# Patient Record
Sex: MALE | Race: WHITE | NOT HISPANIC OR LATINO | Employment: OTHER | URBAN - METROPOLITAN AREA
[De-identification: names, ages, dates, MRNs, and addresses within clinical notes are randomized per-mention and may not be internally consistent; named-entity substitution may affect disease eponyms.]

---

## 2018-12-17 ENCOUNTER — CONVERSION ENCOUNTER (OUTPATIENT)
Dept: GASTROENTEROLOGY | Facility: CLINIC | Age: 53
End: 2018-12-17

## 2018-12-17 ENCOUNTER — OFFICE VISIT CONVERTED (OUTPATIENT)
Dept: GASTROENTEROLOGY | Facility: CLINIC | Age: 53
End: 2018-12-17
Attending: PHYSICIAN ASSISTANT

## 2019-01-08 ENCOUNTER — HOSPITAL ENCOUNTER (OUTPATIENT)
Dept: GASTROENTEROLOGY | Facility: HOSPITAL | Age: 54
Setting detail: HOSPITAL OUTPATIENT SURGERY
Discharge: HOME | End: 2019-01-08
Attending: INTERNAL MEDICINE

## 2019-01-08 LAB
ALBUMIN SERPL-MCNC: 3.6 G/DL (ref 3.5–5)
ALBUMIN/GLOB SERPL: 1.2 {RATIO} (ref 1.4–2.6)
ALP SERPL-CCNC: 89 U/L (ref 56–119)
ALT SERPL-CCNC: 27 U/L (ref 10–40)
ANION GAP SERPL CALC-SCNC: 13 MMOL/L (ref 8–19)
AST SERPL-CCNC: 46 U/L (ref 15–50)
BILIRUB SERPL-MCNC: 0.63 MG/DL (ref 0.2–1.3)
BUN SERPL-MCNC: 26 MG/DL (ref 5–25)
BUN/CREAT SERPL: 28 {RATIO} (ref 6–20)
CALCIUM SERPL-MCNC: 9.2 MG/DL (ref 8.7–10.4)
CHLORIDE SERPL-SCNC: 106 MMOL/L (ref 99–111)
CONV CO2: 23 MMOL/L (ref 22–32)
CONV TOTAL PROTEIN: 6.6 G/DL (ref 6.3–8.2)
CREAT UR-MCNC: 0.92 MG/DL (ref 0.7–1.2)
GFR SERPLBLD BASED ON 1.73 SQ M-ARVRAT: >60 ML/MIN/{1.73_M2}
GLOBULIN UR ELPH-MCNC: 3 G/DL (ref 2–3.5)
GLUCOSE BLD-MCNC: 145 MG/DL (ref 70–99)
GLUCOSE SERPL-MCNC: 154 MG/DL (ref 70–99)
INR PPP: 3.29 (ref 2–3)
OSMOLALITY SERPL CALC.SUM OF ELEC: 292 MOSM/KG (ref 273–304)
POTASSIUM SERPL-SCNC: 4.8 MMOL/L (ref 3.5–5.3)
PROTHROMBIN TIME: 30.5 S (ref 9.4–12)
SODIUM SERPL-SCNC: 137 MMOL/L (ref 135–147)

## 2019-01-10 ENCOUNTER — HOSPITAL ENCOUNTER (OUTPATIENT)
Dept: GASTROENTEROLOGY | Facility: HOSPITAL | Age: 54
Setting detail: HOSPITAL OUTPATIENT SURGERY
Discharge: HOME | End: 2019-01-10
Attending: INTERNAL MEDICINE

## 2019-01-10 LAB
GLUCOSE BLD-MCNC: 146 MG/DL (ref 70–99)
INR PPP: 2.17 (ref 2–3)
PROTHROMBIN TIME: 20.5 S (ref 9.4–12)

## 2019-01-17 ENCOUNTER — OFFICE VISIT CONVERTED (OUTPATIENT)
Dept: OTHER | Facility: HOSPITAL | Age: 54
End: 2019-01-17
Attending: INTERNAL MEDICINE

## 2019-02-20 ENCOUNTER — OFFICE VISIT CONVERTED (OUTPATIENT)
Dept: GASTROENTEROLOGY | Facility: CLINIC | Age: 54
End: 2019-02-20
Attending: PHYSICIAN ASSISTANT

## 2019-03-25 ENCOUNTER — HOSPITAL ENCOUNTER (OUTPATIENT)
Dept: PHYSICAL THERAPY | Facility: CLINIC | Age: 54
Setting detail: RECURRING SERIES
Discharge: STILL ADMITTED OR ADMINISTRATIVE | End: 2019-03-26
Attending: NURSE PRACTITIONER

## 2019-03-27 ENCOUNTER — HOSPITAL ENCOUNTER (OUTPATIENT)
Dept: PHYSICAL THERAPY | Facility: CLINIC | Age: 54
Setting detail: RECURRING SERIES
Discharge: HOME | End: 2019-06-24
Attending: NURSE PRACTITIONER

## 2019-06-25 ENCOUNTER — OFFICE VISIT CONVERTED (OUTPATIENT)
Dept: OTHER | Facility: HOSPITAL | Age: 54
End: 2019-06-25
Attending: INTERNAL MEDICINE

## 2019-07-26 ENCOUNTER — OFFICE VISIT CONVERTED (OUTPATIENT)
Dept: GASTROENTEROLOGY | Facility: CLINIC | Age: 54
End: 2019-07-26
Attending: PHYSICIAN ASSISTANT

## 2019-07-26 ENCOUNTER — HOSPITAL ENCOUNTER (OUTPATIENT)
Dept: OTHER | Facility: HOSPITAL | Age: 54
Discharge: HOME | End: 2019-07-26
Attending: PHYSICIAN ASSISTANT

## 2019-07-26 LAB
ALBUMIN SERPL-MCNC: 3.1 G/DL (ref 3.5–5)
ALBUMIN/GLOB SERPL: 0.7 {RATIO} (ref 1.4–2.6)
ALP SERPL-CCNC: 131 U/L (ref 56–119)
ALT SERPL-CCNC: 25 U/L (ref 10–40)
AMMONIA PLAS-MCNC: 90 UMOL/L (ref 16–60)
ANION GAP SERPL CALC-SCNC: 18 MMOL/L (ref 8–19)
AST SERPL-CCNC: 49 U/L (ref 15–50)
BASOPHILS # BLD MANUAL: 0.07 10*3/UL (ref 0–0.2)
BASOPHILS NFR BLD MANUAL: 0.7 % (ref 0–3)
BILIRUB SERPL-MCNC: 1.99 MG/DL (ref 0.2–1.3)
BUN SERPL-MCNC: 18 MG/DL (ref 5–25)
BUN/CREAT SERPL: 17 {RATIO} (ref 6–20)
CALCIUM SERPL-MCNC: 9.4 MG/DL (ref 8.7–10.4)
CHLORIDE SERPL-SCNC: 91 MMOL/L (ref 99–111)
CONV AFP: 3.2 NG/ML (ref 0–8.7)
CONV CO2: 25 MMOL/L (ref 22–32)
CONV TOTAL PROTEIN: 7.5 G/DL (ref 6.3–8.2)
CREAT UR-MCNC: 1.06 MG/DL (ref 0.7–1.2)
DEPRECATED RDW RBC AUTO: 41.9 FL
EOSINOPHIL # BLD MANUAL: 0.32 10*3/UL (ref 0–0.7)
EOSINOPHIL NFR BLD MANUAL: 3.4 % (ref 0–7)
ERYTHROCYTE [DISTWIDTH] IN BLOOD BY AUTOMATED COUNT: 13.3 % (ref 11.5–14.5)
FOLATE SERPL-MCNC: 16.5 NG/ML (ref 4.8–20)
GFR SERPLBLD BASED ON 1.73 SQ M-ARVRAT: >60 ML/MIN/{1.73_M2}
GLOBULIN UR ELPH-MCNC: 4.4 G/DL (ref 2–3.5)
GLUCOSE SERPL-MCNC: 328 MG/DL (ref 70–99)
GRANS (ABSOLUTE): 5.65 10*3/UL (ref 2–8)
GRANS: 60.4 % (ref 30–85)
HBA1C MFR BLD: 13.2 G/DL (ref 14–18)
HCT VFR BLD AUTO: 37.1 % (ref 42–52)
IMM GRANULOCYTES # BLD: 0.02 10*3/UL (ref 0–0.54)
IMM GRANULOCYTES NFR BLD: 0.2 % (ref 0–0.43)
INR PPP: 1.61 (ref 2–3)
LYMPHOCYTES # BLD MANUAL: 2.37 10*3/UL (ref 1–5)
LYMPHOCYTES NFR BLD MANUAL: 10 % (ref 3–10)
MCH RBC QN AUTO: 30.3 PG (ref 27–31)
MCHC RBC AUTO-ENTMCNC: 35.6 G/DL (ref 33–37)
MCV RBC AUTO: 85.3 FL (ref 80–96)
MONOCYTES # BLD AUTO: 0.94 10*3/UL (ref 0.2–1.2)
OSMOLALITY SERPL CALC.SUM OF ELEC: 287 MOSM/KG (ref 273–304)
PLATELET # BLD AUTO: 167 10*3/UL (ref 130–400)
PMV BLD AUTO: 10.5 FL (ref 7.4–10.4)
POTASSIUM SERPL-SCNC: 3.4 MMOL/L (ref 3.5–5.3)
PROTHROMBIN TIME: 15.6 S (ref 9.4–12)
RBC # BLD AUTO: 4.35 10*6/UL (ref 4.7–6.1)
SODIUM SERPL-SCNC: 131 MMOL/L (ref 135–147)
VARIANT LYMPHS NFR BLD MANUAL: 25.3 % (ref 20–45)
VIT B12 SERPL-MCNC: 1210 PG/ML (ref 211–911)
WBC # BLD AUTO: 9.37 10*3/UL (ref 4.8–10.8)

## 2019-08-22 ENCOUNTER — CONVERSION ENCOUNTER (OUTPATIENT)
Dept: OTHER | Facility: HOSPITAL | Age: 54
End: 2019-08-22

## 2019-10-28 ENCOUNTER — OFFICE VISIT CONVERTED (OUTPATIENT)
Dept: GASTROENTEROLOGY | Facility: CLINIC | Age: 54
End: 2019-10-28
Attending: PHYSICIAN ASSISTANT

## 2019-10-28 ENCOUNTER — CONVERSION ENCOUNTER (OUTPATIENT)
Dept: OTHER | Facility: HOSPITAL | Age: 54
End: 2019-10-28

## 2019-12-19 ENCOUNTER — CONVERSION ENCOUNTER (OUTPATIENT)
Dept: OTHER | Facility: HOSPITAL | Age: 54
End: 2019-12-19

## 2020-01-28 ENCOUNTER — OFFICE VISIT CONVERTED (OUTPATIENT)
Dept: GASTROENTEROLOGY | Facility: CLINIC | Age: 55
End: 2020-01-28
Attending: PHYSICIAN ASSISTANT

## 2020-02-13 ENCOUNTER — HOSPITAL ENCOUNTER (OUTPATIENT)
Dept: GASTROENTEROLOGY | Facility: HOSPITAL | Age: 55
Setting detail: HOSPITAL OUTPATIENT SURGERY
Discharge: HOME | End: 2020-02-13
Attending: INTERNAL MEDICINE

## 2020-02-13 LAB — GLUCOSE BLD-MCNC: 209 MG/DL (ref 70–99)

## 2020-03-19 ENCOUNTER — CONVERSION ENCOUNTER (OUTPATIENT)
Dept: OTHER | Facility: HOSPITAL | Age: 55
End: 2020-03-19

## 2020-05-13 ENCOUNTER — OFFICE VISIT CONVERTED (OUTPATIENT)
Dept: GASTROENTEROLOGY | Facility: CLINIC | Age: 55
End: 2020-05-13
Attending: PHYSICIAN ASSISTANT

## 2020-07-09 ENCOUNTER — OFFICE VISIT CONVERTED (OUTPATIENT)
Dept: OTHER | Facility: HOSPITAL | Age: 55
End: 2020-07-09
Attending: INTERNAL MEDICINE

## 2020-10-26 ENCOUNTER — OFFICE VISIT CONVERTED (OUTPATIENT)
Dept: GASTROENTEROLOGY | Facility: CLINIC | Age: 55
End: 2020-10-26
Attending: NURSE PRACTITIONER

## 2021-05-13 NOTE — PROGRESS NOTES
Quick Note      Patient Name: Nic Nicolas   Patient ID: [id number]   Sex: Male   YOB: 1965    Primary Care Provider: Sangeeta LA   Referring Provider: Sangeeta LA    Visit Date: May 13, 2020    Provider: Marquis Colmenares PA-C   Location: [facility]   Location Address: [address] [place] [address]  [id number]   Location Phone: [phone]          History Of Present Illness  TELEHEALTH TELEPHONE VISIT  Chief Complaint: Colonoscopy follow up   Nic Nicolas is a 55 year old /White male who is presenting for evaluation via telehealth telephone visit. Verbal consent obtained before beginning visit.   Provider spent 11 minutes with the patient during telehealth visit.   The following staff were present during this visit: Avila [place] MA   Past Medical History/Overview of Patient Symptoms     55-year-old male with history of cirrhosis due to probable nonalcoholic fatty liver disease returns for his colonoscopy results.  Review of his colonoscopy by Dr. Roblero 02/2020 show external grade II hemorrhoids with band litagtion and a tubular adenoma successfully removed from the colon.  Recommended a colonoscopy in 5 years. He is currently taking spironolactone 100 mg daily and Lasix 40 mg mg daily.  He is also on Xifaxan 550 mg twice daily and lactulose to produce 2-3 bowel movements daily.  He had an EGD by Dr. Roblero 01/2019 showed grade 1 esophagitis and gastritis.  There is no evidence of varices seen at that time.  His weight today is 303 pounds which is up 4 pounds since his last office visit.  His most recent lab work showed ammonia 65, INR 1.6, hemoglobin 12, platelets 80 K, glucose 353, T bili 2.  He had a normal RUQ US 02/2020. He did have a recent UTI and was treated with Cipro.           Assessment  · GERD (gastroesophageal reflux disease)     530.81/K21.9  · Personal history of colonic  polyps     V12.72/Z86.010  · Cirrhosis     571.5/K74.60      Plan  · Orders  o CBC with Auto Diff [facility] (31007) - 530.81/K21.9, V12.72/Z86.010, 571.5/K74.60 - 05/13/2020  o CMP [facility] (88700) - 530.81/K21.9, V12.72/Z86.010, 571.5/K74.60 - 05/13/2020  o Physician Telephone Evaluation, 11-20 minutes (13890) - 530.81/K21.9, V12.72/Z86.010, 571.5/K74.60 - 05/13/2020  o PT/INR (42414) - 530.81/K21.9, V12.72/Z86.010, 571.5/K74.60 - 05/13/2020  · Medications  o Medications have been Reconciled  o Transition of Care or Provider Policy  · Instructions  o Plan Of Care: 55 year old male with cirrhosis. He will continue current medications. He will monitor his weight daily, have less than 2 g of sodium in his diet, avoid any alcohol, and have good control of blood glucose levels. He will have the labwork above and call for results. He will follow up in 3 months and at that time he will need a RU US.            Electronically Signed by: Marquis Colmenares PA-C -Author on May 13, 2020 10:25:16 AM  Electronically Co-signed by: Karlos Roblero MD -Reviewer on May 13, 2020 04:30:53 PM

## 2021-05-13 NOTE — PROGRESS NOTES
Progress Note      Patient Name: Nic Nicolas   Patient ID: [id number]   Sex: Male   YOB: 1965    Primary Care Provider: Sangeeta LA   Referring Provider: Sangeeta LA    Visit Date: October 26, 2020    Provider: BESSIE Armas   Location: [facility]   Location Address: [address] [place] [address]  [place]  [id number]   Location Phone: [phone]          Chief Complaint  · Cirrhosis      History Of Present Illness     55-year-old male with a history of cirrhosis due to probable nonalcoholic fatty liver disease returns for follow-up.  He was admitted at [facility] for hepatic encephalopathy earlier this month for 3 days.   He is taking spironolactone 100 mg daily, Lasix 80 mg daily, Xifaxan 550 twice daily, omeprazole 40 mg daily and lactulose.  He reports he has been having a lot of issues with memory loss and being disoriented.  He reports he also had chest pain while he was in the ER.  He reports that he always has swelling in his legs due to lymphedema.  He does report occasional jaundice, having slight jaundice in office today.  He reports that he does not weigh himself because he forgets to. He does report that he is having a lot of stomach issues, making it hard for him to take lactulose. Meld score 19. Review his colonoscopy by Dr. Roblero 02/2020 revealed grade 2 external hemorrhoids with band ligation and a tubular adenoma removed from the colon.  Recommend repeat colonoscopy 2025.  Review of his last EGD by Dr. Roblero 01/2019 revealed grade 1 esophagitis and gastritis with no evidence of varices.      Labs 10/08/2020: Hemoglobin 10.2, hematocrit 33.1, platelets 108, INR 2.72, ALT 29, AST 62, total bili 1.34, alk phos 112, creatinine 0.  88         Past Medical History  Cirrhosis; Colon polyps; Diabetes; Hypertension; Reflux; Sleep apnea         Past Surgical History  Colonoscopy; EGD         Medication List  amlodipine 10 mg oral tablet;  "baclofen 10 mg oral tablet; Basaglar KwikPen U-100 Insulin 100 unit/mL (3 mL) subcutaneous insulin pen; buspirone 7.5 mg oral tablet; famotidine 40 mg oral tablet; furosemide 40 mg oral tablet; lactulose 10 gram/15 mL oral solution; loratadine 10 mg oral tablet; omeprazole 40 mg oral capsule,delayed release(DR/EC); oxycodone 10 mg oral tablet; potassium 99 mg oral tablet; promethazine 25 mg oral tablet; propranolol 20 mg oral tablet; sildenafil 100 mg oral tablet; spironolactone 100 mg oral tablet; sucralfate 1 gram oral tablet; Ventolin HFA 90 mcg/actuation inhalation HFA aerosol inhaler; warfarin 10 mg oral tablet; Xifaxan 550 mg oral tablet         Allergy List  NO KNOWN DRUG ALLERGIES       Allergies Reconciled  Family Medical History  Stomach Neoplasm, Malignant         Social History  Alcohol; Tobacco (Never)         Review of Systems  · Constitutional  o Denies  o : chills, fever  · Cardiovascular  o Admits  o : chest pain  · Respiratory  o Admits  o : shortness of breath  · Gastrointestinal  o Denies  o : nausea, vomiting, dysphagia  · Endocrine  o Denies  o : weight gain, weight loss      Vitals  Date Time BP Position Site L\R Cuff Size HR RR TEMP (F) WT  HT  BMI kg/m2 BSA m2 O2 Sat FR L/min FiO2 HC       10/26/2020 01:17 /71 Sitting    97 - R 16  328lbs 0oz 5'  8\" 49.87 2.67 97 %            Physical Examination  · Constitutional  o Appearance  o : obese, well developed, slight jaundice  · Head and Face  o Head  o : Normocephalic with no worriesome skin lesions  · Eyes  o Vision  o :   § Visual Fields  § : eyes move symmetrical in all directions  o Sclerae  o : sclerae anicteric  · Neck  o Inspection/Palpation  o : Trachea is midline, no adenopathy  · Respiratory  o Respiratory Effort  o : Breathing is unlabored.  o Inspection of Chest  o : normal appearance  o Auscultation of Lungs  o : Chest is clear to auscultation bilaterally.  · Cardiovascular  o Heart  o :   § Auscultation of Heart  § : no " murmurs, rubs, or gallops, RRR  o Peripheral Vascular System  o :   § Extremities  § : no cyanosis, clubbing or edema;   · Gastrointestinal  o Abdominal Examination  o : Abdomen is soft, nontender to palpation, with normal active bowel sounds, no appreciable hepatosplenomegaly.          Assessment  · Cirrhosis Of Liver     571.5/K74.60  · Colonic Polyps, Personal History of     V12.72      Plan  · Orders  o CBC with Auto Diff [facility] (49659) - 571.5/K74.60 - 10/26/2020  o CMP [facility] (05358) - 571.5/K74.60 - 10/26/2020  o PT/INR (23870) - 571.5/K74.60 - 10/26/2020  o Alpha fetoprotein test (76250, 32145) - 571.5/K74.60 - 10/26/2020  o Ammonia level (89483) - 571.5/K74.60 - 10/26/2020  o RUQ US (right upper quadrant ultrasound) (51250) - 571.5/K74.60 - 10/26/2020  · Medications  o ondansetron 4 mg oral tablet,disintegrating   SIG: dissolve 1 tablet by oral route 2 times a day   DISP: (30) Tablet with 3 refills  Prescribed on 10/26/2020     o Medications have been Reconciled  o Transition of Care or Provider Policy  · Instructions  o 55-year-old male with a history of cirrhosis due to probable nonalcoholic fatty liver disease returns for follow-up. We have discussed his recent hospitalization and his need to continue his lactulose. I have also discussed the importance of a daily weight. I have sent in a prescription of Zofran 4 mg twice daily to see if that will help improve his nausea. I am also going to refer him to get evaluated for possible liver transplant. I am going to have him undergo a right upper quadrant ultrasound, CMP, CBC, PT/INR, alpha-fetoprotein, and ammonia level. We have discussed based off his ultrasound, he may need a paracentesis. I am going to have him follow-up in 3 months time.            Electronically Signed by: BESSIE Armas -Author on October 26, 2020 02:03:11 PM  Electronically Co-signed by: Karlos Roblero MD -Reviewer on November 3, 2020 05:22:27 PM

## 2021-05-14 VITALS
HEART RATE: 97 BPM | HEIGHT: 68 IN | DIASTOLIC BLOOD PRESSURE: 71 MMHG | OXYGEN SATURATION: 97 % | BODY MASS INDEX: 47.74 KG/M2 | RESPIRATION RATE: 16 BRPM | WEIGHT: 315 LBS | SYSTOLIC BLOOD PRESSURE: 180 MMHG

## 2021-05-15 VITALS
DIASTOLIC BLOOD PRESSURE: 70 MMHG | HEART RATE: 91 BPM | OXYGEN SATURATION: 97 % | SYSTOLIC BLOOD PRESSURE: 148 MMHG | HEIGHT: 68 IN | WEIGHT: 303 LBS | BODY MASS INDEX: 45.92 KG/M2 | RESPIRATION RATE: 16 BRPM

## 2021-05-15 VITALS
BODY MASS INDEX: 47.74 KG/M2 | SYSTOLIC BLOOD PRESSURE: 170 MMHG | HEIGHT: 68 IN | OXYGEN SATURATION: 97 % | HEART RATE: 91 BPM | DIASTOLIC BLOOD PRESSURE: 79 MMHG | RESPIRATION RATE: 16 BRPM | WEIGHT: 315 LBS

## 2021-05-15 VITALS
OXYGEN SATURATION: 99 % | WEIGHT: 297 LBS | SYSTOLIC BLOOD PRESSURE: 140 MMHG | DIASTOLIC BLOOD PRESSURE: 67 MMHG | HEART RATE: 99 BPM | RESPIRATION RATE: 16 BRPM | BODY MASS INDEX: 45.01 KG/M2 | HEIGHT: 68 IN

## 2021-05-15 VITALS
WEIGHT: 299 LBS | DIASTOLIC BLOOD PRESSURE: 67 MMHG | SYSTOLIC BLOOD PRESSURE: 152 MMHG | BODY MASS INDEX: 45.31 KG/M2 | HEIGHT: 68 IN | HEART RATE: 100 BPM | OXYGEN SATURATION: 99 %

## 2021-05-15 VITALS
WEIGHT: 315 LBS | DIASTOLIC BLOOD PRESSURE: 80 MMHG | OXYGEN SATURATION: 97 % | HEART RATE: 89 BPM | BODY MASS INDEX: 47.74 KG/M2 | SYSTOLIC BLOOD PRESSURE: 217 MMHG | HEIGHT: 68 IN

## 2021-05-28 VITALS
RESPIRATION RATE: 22 BRPM | DIASTOLIC BLOOD PRESSURE: 71 MMHG | HEIGHT: 68 IN | WEIGHT: 312.2 LBS | TEMPERATURE: 97.8 F | HEIGHT: 68 IN | RESPIRATION RATE: 20 BRPM | BODY MASS INDEX: 47.32 KG/M2 | SYSTOLIC BLOOD PRESSURE: 140 MMHG | WEIGHT: 299.8 LBS | TEMPERATURE: 98.3 F | RESPIRATION RATE: 18 BRPM | HEART RATE: 81 BPM | SYSTOLIC BLOOD PRESSURE: 160 MMHG | TEMPERATURE: 98.3 F | DIASTOLIC BLOOD PRESSURE: 70 MMHG | WEIGHT: 307.9 LBS | SYSTOLIC BLOOD PRESSURE: 177 MMHG | BODY MASS INDEX: 45.44 KG/M2 | SYSTOLIC BLOOD PRESSURE: 168 MMHG | HEIGHT: 68 IN | HEART RATE: 91 BPM | DIASTOLIC BLOOD PRESSURE: 82 MMHG | OXYGEN SATURATION: 98 % | OXYGEN SATURATION: 97 % | HEART RATE: 86 BPM | WEIGHT: 315 LBS | OXYGEN SATURATION: 97 % | TEMPERATURE: 97 F | SYSTOLIC BLOOD PRESSURE: 136 MMHG | HEIGHT: 68 IN | HEIGHT: 68 IN | TEMPERATURE: 98.2 F | HEIGHT: 68 IN | OXYGEN SATURATION: 96 % | HEART RATE: 83 BPM | BODY MASS INDEX: 44.92 KG/M2 | BODY MASS INDEX: 46.66 KG/M2 | WEIGHT: 315 LBS | OXYGEN SATURATION: 96 % | DIASTOLIC BLOOD PRESSURE: 79 MMHG | RESPIRATION RATE: 20 BRPM | SYSTOLIC BLOOD PRESSURE: 183 MMHG | DIASTOLIC BLOOD PRESSURE: 109 MMHG | BODY MASS INDEX: 47.74 KG/M2 | WEIGHT: 296.4 LBS | TEMPERATURE: 97 F | RESPIRATION RATE: 24 BRPM | HEART RATE: 72 BPM | RESPIRATION RATE: 20 BRPM | OXYGEN SATURATION: 98 % | HEART RATE: 101 BPM | DIASTOLIC BLOOD PRESSURE: 92 MMHG | BODY MASS INDEX: 47.74 KG/M2

## 2021-05-28 NOTE — PROGRESS NOTES
Patient: PARISH WRIGHT     Acct: [id number]     Report: #[id number]  UNIT #: [id number]     : 1965    Encounter Date:2019  PRIMARY CARE:   ***Signed***  --------------------------------------------------------------------------------------------------------------------  DATE: 19      PCP not in lookup:        Sangeeta Fleming      Reason For Consult      NP Consult- Pancytopenia ; patient was seen on 2018 as an inpatient            History of Present Illness      53-year-old white male was seen in 2018 as an inpatient.      Impressions  at that time were pancytopenia probably secondary to hypersplenism.     He had a compensated cirrhosis and rectal bleeding during the admission            Patient feels much better he apparently was given medications for his liver.      Patient was given IV iron and no blood transfusion during his hospital stay            Past Medical/Surgical History             Hypertension             Diabetes Mellitus             No Heart Disease             Blood Clots (DVT)             No Cancer             No Lung Disease             No Kidney Disease             Other (Depression, hyperlipidemia, congestive heart failure, obesity)            Pyschiatric History      No Depression, No Anxiety, No Panic Attacks, No Bipolar, No Other            Social History      Social History:  No Tobacco Use, No Alcohol Use, No Recreational Drug use, No     Other            Family History      Hypertension (Mom, Dad), Diabetes Mellitus (Sister), Heart Disease (Mom, dad);     No Blood Clots; Cancer (Dad- Lung Cancer), Lung Disease (2 Sisters with COPD);     No Kidney Disease, No Other            Allergies      Coded Allergies:             NO KNOWN ALLERGIES (Unverified , 1/10/19)            Medications      Medications    Last Reconciled on 19 13:52 by VARUN STINSON MD      Spironolactone (Spironolactone*) 100 Mg Tablet      100 MG PO QDAY, TAB          Reported         1/17/19       Garland-Fluticasone (Fluticasone 50 mcg) 16 Gm Spray.susp      1 PUFFS NARE EACH QDAY for ALLERGIES, #1 BOTTLE 0 Refills         Reported         1/17/19       Omeprazole (Omeprazole*) 20 Mg Tablet.dr      40 MG PO HS, #30 CAP 5 Refills         Prov: Karlos Roblero         1/10/19       Potassium Chloride (K-Dur*) 10 Meq Tab.er.prt      10 MEQ PO QDAY, #30 TAB 0 Refills         Reported         1/10/19       Propranolol (Inderal) 10 Mg Tablet      10 MG PO BID, TAB         Reported         1/10/19       Gemfibrozil (Gemfibrozil*) 600 Mg Tablet      600 MG PO BID, TAB         Reported         1/10/19       Ergocalciferol (Vitamin D2) 50,000 Unit Capsule      [address]661 UNITS PO FR for 30 Days, #4 CAP         Reported         1/10/19       Baclofen (BACLOFEN) 10 Mg Tablet      10 MG PO HS, #30 TAB 0 Refills         Reported         1/10/19       Gabapentin (Gabapentin) 300 Mg Capsule      600 MG PO HS, #60 CAP 0 Refills         Reported         1/10/19       Loratadine (Alavert*) 10 Mg Tab.rapdis      10 MG PO BID, #60 TAB.RAPDIS         Reported         1/8/19       Glimepiride (Glimepiride*) 1 Mg Tablet      1 MG PO BID, TAB         Reported         1/8/19       Buspirone Hcl (Buspar) 15 Mg Tablet      7.5 MG PO HS, #30 TAB         Reported         1/8/19       MDI-Albuterol (Ventolin HFA) 18 Gm Hfa.aer.ad      2 PUFFS INH Q6H PRN for SHORTNESS OF BREATH, #1 MDI 0 Refills         Reported         1/8/19       Lactulose (Lactulose*) 10 Gm/15 Ml Solution      30 ML PO QID, #900 ML 0 Refills         Reported         1/8/19       Furosemide* (Lasix*) 40 Mg Tablet      40 MG PO BID@09,17, #60 TAB 0 Refills         Reported         1/8/19       Warfarin Sod (Coumadin*) 10 Mg Tablet      10 MG PO QDAY@16, #30 TAB 0 Refills         Reported         1/8/19       oxyCODONE HCl/Acetaminophen (oxyCODONE HCl/Acetaminophen 10/325 MG) 1 Each     Tablet      1 TAB PO Q4H PRN for PAIN, TAB 0 Refills          Reported         1/8/19      Current Medications      Current Medications Reviewed 1/17/19            Review of Systems      Abnormal as noted below; all other systems have been reviewed and are negative.      General:  Anxiety, Fatigue Scale: (9), Pain Scale: (9)      HEENT:  No Dysphagia, No Hearing Changes      Respiratory:  No Cough, No Shortness of Air; Wheezing      Cardiovascular:  No Chest Pain, No Pedal Edema      Gastrointestinal:  Nausea; No Vomiting; Appetite Good (good)      Genitourinary:  Nocturia (10x), Dysuria      Musculoskeletal:  No Joint Effusions, No Joint Tenderness      Endocrine:  No Heat Intolerance, No Cold Intolerance      Hematologic:  No Bleeding, No Bruising      Allergic/Immunologic:  No Hives, No Throat closing off      Psychological:  No Anxiety, No Depression      Neurological:  No Headaches, No Dizziness; Numbness (feet, hands)      Skin:  No Rash      Vitals:             Height 5 ft 8 in / 172.72 cm           Weight 307 lbs 14.4 oz / 139.6611 kg           BSA 2.46 m2           BMI 46.8 kg/m2           Temperature 97.0 F / 36.11 C           Pulse 72           Respirations 24           Blood Pressure 177/71           Pulse Oximetry 96%            Exam      Constitutional:  No acute distress, Conversant, Pleasant, Other (Limping)      Eyes:  Anicteric sclerae, Palpebral Conjunctivae (Pink), SUAD      HENT:  Oropharynx clear; No Erythema; Buccal mucosae (Pink)      Neck:  Supple, Full Range of Motion      Lungs:  Clear to Ausculation, Normal Respiratory Effort; No Rhonchi, No     Crackles, No Wheezes      Cardiovascular:  RRR; No Murmurs; Normal PMI, Peripheral Edema (Left more than     right)      Abdomen:  Soft, NABS; No Tenderness      Skin:  Normal temperature, Normal tone; No Induration; Other (No dermatosis)      Extremities:  No digital cyanosis, No digital ischemia, Other (Limping)      Psychiatric:  Appropriate affect, Intact judgement, AAO x 3      Neurological:  Cranial  Nerve II-XII; No Focal Sensory deficits      Breast:  Masses            Impression/Problem List      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      Thank you very much for allowing me to participate in the care of your patient.     I will keep you posted on the progress of their workup.            Patient Education:        DI for Pancytopenia            PREVENTION      Hx Influenza Vaccination:  No (DECLINES)      2 or More Falls Past Year?:  No      Fall Past Year with Injury?:  No      Hx Pneumococcal Vaccination:  No (DECLINES)      Chart initiated by      NOELLE Winters MA                 Disclaimer: Converted document may not contain table formatting or lab diagrams. Please see [employer] for the authenticated document.

## 2021-05-28 NOTE — PROGRESS NOTES
Patient: PARISH WRIGHT     Acct: [id number]     Report: #[id number]  UNIT #: [id number]     : 1965    Encounter Date:2019  PRIMARY CARE:   ***Signed***  --------------------------------------------------------------------------------------------------------------------  Progress Note      DATE: 19      Chief Complaint      FU Pancytopenia            Subjective      53-year-old white male was seen at [facility] as an inpatient.      Patient presented with pancytopenia which was deemed to be secondary to     hypersplenism.  Patient was in the hospital for decompensated liver cirrhosis     and rectal bleeding.  Patient been given iron and no blood saturations.            Patient claims that he lives day by day.  He is followed up by Dr. Karlos kan.            Past Med/Surg History            Past Med/Surg History:   Hypertension             Diabetes Mellitus             No Heart Disease             Blood Clots (DVT)             No Cancer             No Lung Disease             No Kidney Disease             Other (Depression, hyperlipidemia, congestive heart failure, obesity)            Social History      Social History:  No Tobacco Use, No Alcohol Use, No Recreational Drug use, No     Other            Allergies      Coded Allergies:             NO KNOWN ALLERGIES (Unverified , 1/10/19)            Medications      Medications    Last Reconciled on 7/3/19 18:37 by VARUN STINSON MD      Spironolactone (Spironolactone*) 100 Mg Tablet      100 MG PO QDAY, TAB         Reported         19       Garland-Fluticasone (Fluticasone 50 mcg) 16 Gm Spray.susp      1 PUFFS NARE EACH QDAY for ALLERGIES, #1 BOTTLE 0 Refills         Reported         19       Omeprazole (Omeprazole*) 20 Mg Tablet.      40 MG PO HS, #30 CAP 5 Refills         Prov: Karlos Roblero         1/10/19       Potassium Chloride (K-Dur*) 10 Meq Tab.er.prt      10 MEQ PO QDAY, #30 TAB 0 Refills         Reported          1/10/19       Propranolol (Inderal) 10 Mg Tablet      10 MG PO BID, TAB         Reported         1/10/19       Gemfibrozil (Gemfibrozil*) 600 Mg Tablet      600 MG PO BID, TAB         Reported         1/10/19       Ergocalciferol (Vitamin D2) 50,000 Unit Capsule      72903 UNITS PO FR for 30 Days, #4 CAP         Reported         1/10/19       Baclofen (BACLOFEN) 10 Mg Tablet      10 MG PO HS, #30 TAB 0 Refills         Reported         1/10/19       Gabapentin (Gabapentin) 300 Mg Capsule      600 MG PO HS, #60 CAP 0 Refills         Reported         1/10/19       Loratadine (Alavert*) 10 Mg Tab.rapdis      10 MG PO BID, #60 TAB.RAPDIS         Reported         1/8/19       Glimepiride (Glimepiride*) 1 Mg Tablet      1 MG PO BID, TAB         Reported         1/8/19       Buspirone Hcl (Buspar) 15 Mg Tablet      7.5 MG PO HS, #30 TAB         Reported         1/8/19       MDI-Albuterol (Ventolin HFA) 18 Gm Hfa.aer.ad      2 PUFFS INH Q6H PRN for SHORTNESS OF BREATH, #1 MDI 0 Refills         Reported         1/8/19       Lactulose (Lactulose*) 10 Gm/15 Ml Solution      30 ML PO QID, #900 ML 0 Refills         Reported         1/8/19       Furosemide* (Lasix*) 40 Mg Tablet      40 MG PO BID@09,17, #60 TAB 0 Refills         Reported         1/8/19       Warfarin Sod (Coumadin*) 10 Mg Tablet      10 MG PO QDAY@16, #30 TAB 0 Refills         Reported         1/8/19       oxyCODONE-Acetaminophen  Mg (oxyCODONE-Acetaminophen  Mg) 1 Each     Tablet      1 TAB PO Q4H PRN for PAIN, TAB 0 Refills         Reported         1/8/19      Current Medications      Current Medications Reviewed 6/25/19            Review of Systems      General:  No Anxiety; Fatigue Scale: (7), Pain Scale: (8)      HEENT:  No Dysphagia      Respiratory:  No Cough      Cardiovascular:  No Chest Pain      Gastrointestinal:  Nausea; No Vomiting; Diarrhea, Appetite Poor (poor)      Genitourinary:  No Nocturia      Musculoskeletal:  No Joint  Effusions; Aches, Pains (back, L leg)      Endocrine:  No Heat Intolerance      Hematologic:  No Bleeding      Psychological:  No Anxiety      Neurological:  Dizziness, Weakness, Numbness (toes)      Skin:  No Rash            Vitals      Height 5 ft 8 in / 172.72 cm      Weight 312 lbs 3.2 oz / 141.986428 kg      BSA 2.47 m2      BMI 47.5 kg/m2      Temperature 97.8 F / 36.56 C      Pulse 91      Respirations 20      Blood Pressure 183/92      Pulse Oximetry 98%            Exam      Constitutional:  No acute distress, Conversant, Pleasant      Eyes:  Anicteric sclerae, Palpebral Conjunctivae (Pink), SUAD      HENT:  Oropharynx clear; No Erythema; Buccal mucosae (Pain)      Neck:  Supple, Full Range of Motion; No Masses      Cardiovascular:  RRR; No Murmurs; Normal PMI; No Peripheral Edema      Lungs:  Clear to Ausculation, Normal Respiratory Effort      Abdomen:  Soft, NABS; No Masses, No Tenderness      Chest:  Other (Symmetrical)      Lymphatic:  No Neck      Extremities:  No digital cyanosis, No digital ischemia, Normal gait, Other (No     deformity)      Neurological:  Cranial Nerve II-XII (Intact); No Focal Sensory deficits      Psychological:  Appropriate affect, Appropriate mood, Intact judgement, Alert      Skin:  Normal temperature, Other (No dermatosis)            Lab Results      No blood work available            Impression/Problem List      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  He will have     CBC, CMP, iron profile, ferritin, ammonia, PT PTT and reticulocyte count done      He will  follow-up with me at [facility]            Patient Education:        DI for Pancytopenia            PREVENTION      2 or More Falls Past Year?:  No      Fall Past Year with Injury?:  No      Chart initiated by      NOELLE Winters MA                 Disclaimer: Converted document may not contain table formatting or lab diagrams. Please see [employer] for the authenticated document.

## 2021-05-28 NOTE — PROGRESS NOTES
Patient: PARISH WRIGHT     Acct: [id number]     Report: #[id number]  UNIT #: [id number]     : 1965    Encounter Date:2020  PRIMARY CARE:   ***Signed***  --------------------------------------------------------------------------------------------------------------------  DATE: 20      Primary Care Provider      PCP not in lookup:        Sangeeta Fleming            Chief Complaint      FU Pancytopenia            Subjective      55-year-old white female is followed up in my office for pancytopenia.  Patient     claims that she has lost 15 pounds since March.  She claims that she had a lot     of fluid removed.  She claims that her fluid pill has been changed as well as     her blood pressure pill.            She complains of left knee pain and charley horses.            Past Med/Surg History            Past Med/Surg History:   Hypertension             Diabetes Mellitus             No Heart Disease             Blood Clots (DVT)             No Cancer             No Lung Disease             No Kidney Disease             Other (Depression, hyperlipidemia, congestive heart failure, obesity)            Social History      Social History:  No Tobacco Use, No Alcohol Use, No Recreational Drug use, No     Other            Allergies      Coded Allergies:             NO KNOWN ALLERGIES (Unverified , 20)            Medications      Medications    Last Reconciled on 7/10/20 17:16 by VARUN STINSON MD      oxyCODONE HCl (oxyCODONE IR) 10 Mg Tablet      10 MG PO QID, TAB         Reported         20       rifAXIMin (Xifaxan) 550 Mg Tab      550 MG PO BID, TAB         Reported         19       Baclofen (BACLOFEN) 10 Mg Tablet      10 MG PO TID PRN for SPASMS, #90 TAB 0 Refills         Reported         19       Promethazine Hcl (Phenergan*) 25 Mg Tablet      25 MG PO TID PRN for NAUSEA, TAB 0 Refills         Reported         19       Insulin Glargine,Hum.rec.anlog (Basaglar Kwikpen  U-100) 100 Unit/1 Ml Insuln.pen      50 UNITS SUBQ BID, #1 INSULN.PEN         Reported         8/22/19       Sildenafil Citrate (Viagra) 100 Mg Tab      100 MG PO QDAY PRN for PENILE DYSFUNCTION, TAB         Reported         8/22/19       Lisinopril* (Lisinopril*) 40 Mg Tablet      40 MG PO QDAY, #30 TAB 0 Refills         Reported         8/22/19       busPIRone HCl (busPIRone HCl) 15 Mg Tablet      1.5 TAB PO HS, #30 TAB         Reported         8/22/19       Spironolactone (Spironolactone*) 100 Mg Tablet      100 MG PO QDAY, TAB         Reported         1/17/19       Garland-Fluticasone (Fluticasone 50 mcg) 16 Gm Spray.susp      1 PUFFS NARE EACH QDAY for ALLERGIES, #1 BOTTLE 0 Refills         Reported         1/17/19       Omeprazole (Omeprazole*) 20 Mg Tablet.dr      40 MG PO HS, #30 CAP 5 Refills         Prov: Karlos Roblero         1/10/19       Potassium Chloride (K-Dur*) 10 Meq Tab.er.prt      10 MEQ PO QDAY, #30 TAB 0 Refills         Reported         1/10/19       Propranolol (Inderal) 10 Mg Tablet      10 MG PO TID, TAB         Reported         1/10/19       Ergocalciferol (Vitamin D2) 50,000 Unit Capsule      44350 UNITS PO FR for 30 Days, #4 CAP         Reported         1/10/19       Loratadine (Alavert*) 10 Mg Tab.rapdis      10 MG PO QDAY, #60 TAB.RAPDIS         Reported         1/8/19       MDI-Albuterol (Ventolin HFA) 18 Gm Hfa.aer.ad      2 PUFFS INH Q6H PRN for SHORTNESS OF BREATH, #1 MDI 0 Refills         Reported         1/8/19       Lactulose (Lactulose*) 10 Gm/15 Ml Solution      30 ML PO BID, #900 ML 0 Refills         Reported         1/8/19       Furosemide* (Lasix*) 40 Mg Tablet      40 MG PO QDAY, #60 TAB 0 Refills         Reported         1/8/19      Current Medications      Current Medications Reviewed 7/9/20            Pain Assessment      Pain Intensity:  6      Description:  Aching, Sore      Duration:  Continuous            Review of Systems      General:  No Anxiety; Fatigue Scale: (5),  Pain Scale: (6)      HEENT:  No Dysphagia      Respiratory:  No Cough      Cardiovascular:  No Chest Pain, No Pedal Edema      Gastrointestinal:  Nausea, Vomiting; No Dysphagia; Appetite Fair (Fair)      Genitourinary:  No Nocturia      Musculoskeletal:  No Joint Effusions; Aches, Pains (Back, (L) Leg, (L) Knee)      Endocrine:  No Heat Intolerance      Hematologic:  No Bleeding      Allergic/Immunologic:  No Hives      Psychological:  No Anxiety, No Depression      Neurological:  No Headaches; Numbness ((L) Leg)      Skin:  No Rash, No Open Wounds            Vitals      Height 5 ft 8 in / 172.72 cm      Weight 319 lbs 4.8 oz / 144.300510 kg      BSA 2.49 m2      BMI 48.5 kg/m2      Temperature 98.3 F / 36.83 C      Pulse 83      Respirations 22      Blood Pressure 160/82      Pulse Oximetry 98%            Exam      Constitutional:  No acute distress, Conversant, Pleasant      Eyes:  Anicteric sclerae, Palpebral Conjunctivae (Pink pink), SUAD      Neck:  Supple, Full Range of Motion      Cardiovascular:  RRR; No Murmurs; Normal PMI; No Peripheral Edema      Lungs:  Clear to Ausculation, Normal Respiratory Effort      Abdomen:  Soft; No Tenderness; Other (Obese)      Chest:  Other (Symmetrical)      Lymphatic:  No Neck      Extremities:  No digital cyanosis, No digital ischemia, Normal gait, Other (No     deformity)      Neurological:  Cranial Nerve II-XII (Intact); No Focal Sensory deficits      Psychological:  Appropriate affect, Appropriate mood, Intact judgement, Alert      Skin:  Other (Hematoma left lower extremity)            Lab Results      None available            Impression/Problem List      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            Patient Education:        DI for Pancytopenia            PREVENTION      2 or More Falls in Past Year?:  No      Fall Past Year with Injury?:  No      Chart initiated by      NOELLE Hinojosa MA            Electronically signed by Wanda Aldana  07/10/2020 17:16       Disclaimer: Converted document may not contain table formatting or lab diagrams. Please see [facility] for the authenticated document.

## 2021-07-10 ENCOUNTER — HOSPITAL ENCOUNTER (EMERGENCY)
Facility: HOSPITAL | Age: 56
Discharge: HOME | End: 2021-07-10
Attending: EMERGENCY MEDICINE | Admitting: EMERGENCY MEDICINE

## 2021-07-10 VITALS
RESPIRATION RATE: 18 BRPM | BODY MASS INDEX: 44.24 KG/M2 | DIASTOLIC BLOOD PRESSURE: 90 MMHG | WEIGHT: 291.89 LBS | HEART RATE: 96 BPM | OXYGEN SATURATION: 98 % | HEIGHT: 68 IN | TEMPERATURE: 98.1 F | SYSTOLIC BLOOD PRESSURE: 106 MMHG

## 2021-07-10 DIAGNOSIS — S05.02XA ABRASION OF LEFT CORNEA, INITIAL ENCOUNTER: Primary | ICD-10-CM

## 2021-07-10 DIAGNOSIS — H10.213 CHEMICAL CONJUNCTIVITIS OF BOTH EYES: ICD-10-CM

## 2021-07-10 LAB
ALBUMIN SERPL-MCNC: 2.6 G/DL (ref 3.5–5.2)
ALBUMIN/GLOB SERPL: 0.7 G/DL
ALP SERPL-CCNC: 137 U/L (ref 39–117)
ALT SERPL W P-5'-P-CCNC: 24 U/L (ref 1–41)
AMMONIA BLD-SCNC: 54 UMOL/L (ref 16–60)
ANION GAP SERPL CALCULATED.3IONS-SCNC: 9.6 MMOL/L (ref 5–15)
AST SERPL-CCNC: 43 U/L (ref 1–40)
BASOPHILS # BLD AUTO: 0.05 10*3/MM3 (ref 0–0.2)
BASOPHILS NFR BLD AUTO: 1.4 % (ref 0–1.5)
BILIRUB SERPL-MCNC: 2.4 MG/DL (ref 0–1.2)
BUN SERPL-MCNC: 5 MG/DL (ref 6–20)
BUN/CREAT SERPL: 7.5 (ref 7–25)
CALCIUM SPEC-SCNC: 7.9 MG/DL (ref 8.6–10.5)
CHLORIDE SERPL-SCNC: 103 MMOL/L (ref 98–107)
CO2 SERPL-SCNC: 23.4 MMOL/L (ref 22–29)
CREAT SERPL-MCNC: 0.67 MG/DL (ref 0.76–1.27)
DEPRECATED RDW RBC AUTO: 57.1 FL (ref 37–54)
EOSINOPHIL # BLD AUTO: 0.16 10*3/MM3 (ref 0–0.4)
EOSINOPHIL NFR BLD AUTO: 4.4 % (ref 0.3–6.2)
ERYTHROCYTE [DISTWIDTH] IN BLOOD BY AUTOMATED COUNT: 18.2 % (ref 12.3–15.4)
GFR SERPL CREATININE-BSD FRML MDRD: 123 ML/MIN/1.73
GLOBULIN UR ELPH-MCNC: 3.7 GM/DL
GLUCOSE SERPL-MCNC: 303 MG/DL (ref 65–99)
HCT VFR BLD AUTO: 32 % (ref 37.5–51)
HGB BLD-MCNC: 10.1 G/DL (ref 13–17.7)
IMM GRANULOCYTES # BLD AUTO: 0.01 10*3/MM3 (ref 0–0.05)
IMM GRANULOCYTES NFR BLD AUTO: 0.3 % (ref 0–0.5)
LYMPHOCYTES # BLD AUTO: 0.84 10*3/MM3 (ref 0.7–3.1)
LYMPHOCYTES NFR BLD AUTO: 22.9 % (ref 19.6–45.3)
MCH RBC QN AUTO: 26.9 PG (ref 26.6–33)
MCHC RBC AUTO-ENTMCNC: 31.6 G/DL (ref 31.5–35.7)
MCV RBC AUTO: 85.3 FL (ref 79–97)
MONOCYTES # BLD AUTO: 0.46 10*3/MM3 (ref 0.1–0.9)
MONOCYTES NFR BLD AUTO: 12.5 % (ref 5–12)
NEUTROPHILS NFR BLD AUTO: 2.15 10*3/MM3 (ref 1.7–7)
NEUTROPHILS NFR BLD AUTO: 58.5 % (ref 42.7–76)
NRBC BLD AUTO-RTO: 0 /100 WBC (ref 0–0.2)
PLATELET # BLD AUTO: 90 10*3/MM3 (ref 140–450)
PMV BLD AUTO: 10.8 FL (ref 6–12)
POTASSIUM SERPL-SCNC: 3.3 MMOL/L (ref 3.5–5.2)
PROT SERPL-MCNC: 6.3 G/DL (ref 6–8.5)
RBC # BLD AUTO: 3.75 10*6/MM3 (ref 4.14–5.8)
SODIUM SERPL-SCNC: 136 MMOL/L (ref 136–145)
WBC # BLD AUTO: 3.67 10*3/MM3 (ref 3.4–10.8)

## 2021-07-10 PROCEDURE — 80053 COMPREHEN METABOLIC PANEL: CPT | Performed by: EMERGENCY MEDICINE

## 2021-07-10 PROCEDURE — 90471 IMMUNIZATION ADMIN: CPT | Performed by: EMERGENCY MEDICINE

## 2021-07-10 PROCEDURE — 85025 COMPLETE CBC W/AUTO DIFF WBC: CPT | Performed by: EMERGENCY MEDICINE

## 2021-07-10 PROCEDURE — 96374 THER/PROPH/DIAG INJ IV PUSH: CPT

## 2021-07-10 PROCEDURE — 25010000002 HYDROMORPHONE 1 MG/ML SOLUTION: Performed by: EMERGENCY MEDICINE

## 2021-07-10 PROCEDURE — 90715 TDAP VACCINE 7 YRS/> IM: CPT | Performed by: EMERGENCY MEDICINE

## 2021-07-10 PROCEDURE — 25010000002 ONDANSETRON PER 1 MG: Performed by: EMERGENCY MEDICINE

## 2021-07-10 PROCEDURE — 25010000002 TDAP 5-2.5-18.5 LF-MCG/0.5 SUSPENSION: Performed by: EMERGENCY MEDICINE

## 2021-07-10 PROCEDURE — 96375 TX/PRO/DX INJ NEW DRUG ADDON: CPT

## 2021-07-10 PROCEDURE — 82140 ASSAY OF AMMONIA: CPT | Performed by: EMERGENCY MEDICINE

## 2021-07-10 PROCEDURE — 99283 EMERGENCY DEPT VISIT LOW MDM: CPT

## 2021-07-10 RX ORDER — TETRACAINE HYDROCHLORIDE 5 MG/ML
2 SOLUTION OPHTHALMIC ONCE
Status: COMPLETED | OUTPATIENT
Start: 2021-07-10 | End: 2021-07-10

## 2021-07-10 RX ORDER — PROMETHAZINE HYDROCHLORIDE 25 MG/1
TABLET ORAL
COMMUNITY
End: 2021-11-02

## 2021-07-10 RX ORDER — INSULIN GLARGINE 100 [IU]/ML
INJECTION, SOLUTION SUBCUTANEOUS
COMMUNITY
End: 2021-10-16

## 2021-07-10 RX ORDER — PROPRANOLOL HYDROCHLORIDE 20 MG/1
TABLET ORAL
COMMUNITY
End: 2021-10-16

## 2021-07-10 RX ORDER — AMLODIPINE BESYLATE 10 MG/1
TABLET ORAL
COMMUNITY
End: 2021-11-02 | Stop reason: SDUPTHER

## 2021-07-10 RX ORDER — BUSPIRONE HYDROCHLORIDE 7.5 MG/1
TABLET ORAL
COMMUNITY
End: 2021-10-16

## 2021-07-10 RX ORDER — FUROSEMIDE 40 MG/1
TABLET ORAL
COMMUNITY
End: 2021-10-16

## 2021-07-10 RX ORDER — BACLOFEN 10 MG/1
TABLET ORAL
COMMUNITY
End: 2021-10-16

## 2021-07-10 RX ORDER — IBUPROFEN 800 MG/1
400 TABLET ORAL EVERY 8 HOURS PRN
Qty: 15 TABLET | Refills: 0 | OUTPATIENT
Start: 2021-07-10 | End: 2021-08-03

## 2021-07-10 RX ORDER — POLYMYXIN B SULFATE AND TRIMETHOPRIM 1; 10000 MG/ML; [USP'U]/ML
1 SOLUTION OPHTHALMIC
Status: DISCONTINUED | OUTPATIENT
Start: 2021-07-10 | End: 2021-07-10 | Stop reason: HOSPADM

## 2021-07-10 RX ORDER — CYCLOBENZAPRINE HYDROCHLORIDE 7.5 MG/1
TABLET, FILM COATED ORAL
COMMUNITY
End: 2021-10-16

## 2021-07-10 RX ORDER — SPIRONOLACTONE 100 MG/1
100 TABLET, FILM COATED ORAL
COMMUNITY
End: 2021-10-16

## 2021-07-10 RX ORDER — ONDANSETRON 2 MG/ML
4 INJECTION INTRAMUSCULAR; INTRAVENOUS ONCE
Status: COMPLETED | OUTPATIENT
Start: 2021-07-10 | End: 2021-07-10

## 2021-07-10 RX ORDER — HYDROCODONE BITARTRATE AND ACETAMINOPHEN 5; 325 MG/1; MG/1
1 TABLET ORAL EVERY 8 HOURS PRN
Qty: 9 TABLET | Refills: 0 | Status: SHIPPED | OUTPATIENT
Start: 2021-07-10 | End: 2021-07-10 | Stop reason: SDUPTHER

## 2021-07-10 RX ORDER — OMEPRAZOLE 20 MG/1
CAPSULE, DELAYED RELEASE ORAL
COMMUNITY
End: 2021-10-16

## 2021-07-10 RX ORDER — HYDROCODONE BITARTRATE AND ACETAMINOPHEN 5; 325 MG/1; MG/1
1 TABLET ORAL EVERY 8 HOURS PRN
Qty: 9 TABLET | Refills: 0 | OUTPATIENT
Start: 2021-07-10 | End: 2021-10-16

## 2021-07-10 RX ORDER — OXYCODONE AND ACETAMINOPHEN 10; 325 MG/1; MG/1
TABLET ORAL
COMMUNITY
End: 2021-11-02

## 2021-07-10 RX ORDER — SILDENAFIL 100 MG/1
TABLET, FILM COATED ORAL
COMMUNITY
End: 2021-11-10

## 2021-07-10 RX ORDER — IBUPROFEN 800 MG/1
400 TABLET ORAL EVERY 8 HOURS PRN
Qty: 15 TABLET | Refills: 0 | Status: SHIPPED | OUTPATIENT
Start: 2021-07-10 | End: 2021-07-10 | Stop reason: SDUPTHER

## 2021-07-10 RX ORDER — GLIMEPIRIDE 1 MG/1
TABLET ORAL
COMMUNITY
End: 2021-10-16

## 2021-07-10 RX ORDER — ALBUTEROL SULFATE 90 UG/1
AEROSOL, METERED RESPIRATORY (INHALATION)
COMMUNITY
End: 2021-10-16

## 2021-07-10 RX ADMIN — TETRACAINE HYDROCHLORIDE 2 DROP: 5 SOLUTION OPHTHALMIC at 12:30

## 2021-07-10 RX ADMIN — POLYMYXIN B SULFATE AND TRIMETHOPRIM SULFATE 1 DROP: 10000; 1 SOLUTION/ DROPS OPHTHALMIC at 14:33

## 2021-07-10 RX ADMIN — HYDROMORPHONE HYDROCHLORIDE 0.5 MG: 1 INJECTION, SOLUTION INTRAMUSCULAR; INTRAVENOUS; SUBCUTANEOUS at 11:46

## 2021-07-10 RX ADMIN — TETANUS TOXOID, REDUCED DIPHTHERIA TOXOID AND ACELLULAR PERTUSSIS VACCINE, ADSORBED 0.5 ML: 5; 2.5; 8; 8; 2.5 SUSPENSION INTRAMUSCULAR at 14:31

## 2021-07-10 RX ADMIN — ONDANSETRON 4 MG: 2 INJECTION INTRAMUSCULAR; INTRAVENOUS at 11:44

## 2021-07-10 RX ADMIN — SODIUM CHLORIDE 1000 ML: 9 INJECTION, SOLUTION INTRAVENOUS at 11:43

## 2021-07-10 NOTE — ED PROVIDER NOTES
Subjective   Patient is a 56-yo male who presents to the ED with c/o blurred vision that started 4 days ago. Pt has recently been bitten by mosquitos and states that a mosquito may have gotten into his eye. Pt reports that his eyes kept matting the first 24-48 hours. Pt also c/o eye pain and he has a headache when he opens his left eye. Pt has noticed a rash. Pt can open his eyes, but it causes a lot of pain. Pt states that he was pressure washing the day his symptoms started and he uses [employer] to do that.     PCP- Sangeeta Fleming         Eye Problem  Location:  Both eyes  Quality:  Aching  Severity:  Moderate  Onset quality:  Gradual  Duration:  4 days  Timing:  Constant  Chronicity:  New  Relieved by:  None tried  Worsened by:  Nothing  Ineffective treatments:  None tried  Associated symptoms: blurred vision, headaches and photophobia (blurred)    Associated symptoms: no redness and no vomiting    Headaches:     Severity:  Mild    Onset quality:  Gradual    Duration:  4 days    Timing:  Intermittent    Progression:  Unchanged    Chronicity:  New  Rash  Location:  Torso  Torso rash location: lower abdomen.  Severity:  Mild  Onset quality:  Gradual  Duration:  4 days  Timing:  Constant  Progression:  Unable to specify  Chronicity:  New  Context: not chemical exposure    Relieved by:  None tried  Worsened by:  Nothing  Ineffective treatments:  None tried  Associated symptoms: headaches    Associated symptoms: no diarrhea, no fever, no shortness of breath and not vomiting        Review of Systems   Constitutional: Negative for chills and fever.   HENT: Negative for nosebleeds.    Eyes: Positive for blurred vision, photophobia (blurred) and pain. Negative for redness.   Respiratory: Negative for cough and shortness of breath.    Cardiovascular: Negative for chest pain.   Gastrointestinal: Negative for diarrhea and vomiting.   Genitourinary: Negative for dysuria and frequency.   Musculoskeletal: Negative  for back pain and neck pain.   Skin: Positive for rash.   Neurological: Positive for headaches. Negative for seizures.       Past Medical History:   Diagnosis Date   • Asthma    • Diabetes mellitus (CMS/HCC)    • Hypertension    • Liver disease    • Renal disorder        No Known Allergies    Past Surgical History:   Procedure Laterality Date   • LEG SURGERY     • PELVIC FRACTURE SURGERY         History reviewed. No pertinent family history.    Social History     Socioeconomic History   • Marital status:      Spouse name: Not on file   • Number of children: Not on file   • Years of education: Not on file   • Highest education level: Not on file   Tobacco Use   • Smoking status: Never Smoker   Substance and Sexual Activity   • Alcohol use: Never   • Drug use: Never         Objective   Physical Exam  Vitals and nursing note reviewed.   Constitutional:       General: He is awake. He is not in acute distress.     Appearance: He is obese.      Comments: Eyes shut. Will not voluntarily open his eyes. Appears uncomfortable.    HENT:      Head: Normocephalic and atraumatic.      Nose: Nose normal.      Mouth/Throat:      Mouth: Mucous membranes are moist.   Eyes:      General: No scleral icterus.     Conjunctiva/sclera:      Right eye: Right conjunctiva is injected.      Left eye: Left conjunctiva is injected.      Pupils:      Left eye: Corneal abrasion (approximate 6 o clock position) present.      Comments: Sclera injection is worse on the left. PH in right eye is a 7. PH in left eye is an 8.    Cardiovascular:      Rate and Rhythm: Normal rate and regular rhythm.      Pulses: Normal pulses.      Heart sounds: Normal heart sounds. No murmur heard.     Pulmonary:      Effort: Pulmonary effort is normal. No respiratory distress.      Breath sounds: Normal breath sounds and air entry. No decreased air movement. No decreased breath sounds, wheezing, rhonchi or rales.   Chest:      Chest wall: No tenderness.    Abdominal:      Palpations: Abdomen is soft.      Tenderness: There is no abdominal tenderness. There is no guarding or rebound.      Hernia: No hernia is present.   Musculoskeletal:         General: No tenderness. Normal range of motion.      Cervical back: Normal range of motion and neck supple. No tenderness.      Right lower leg: No edema.      Left lower leg: No edema.   Skin:     General: Skin is warm and dry.      Findings: Rash present.      Comments: Excoriated rash on lower abdomen.    Neurological:      Mental Status: He is alert and oriented to person, place, and time. Mental status is at baseline.      Sensory: Sensation is intact. No sensory deficit.      Motor: Motor function is intact. No weakness.   Psychiatric:         Behavior: Behavior normal.         Procedures         ED Course                                            MDM  This is a 56-year-old male who was recently out using chemicals in his  to wash home and shortly thereafter developed irritation in his eyes followed by discharge followed by photophobia and pain to the point now that he does not want to open his eyes unless the lights are completely off and only for a short amount of time. This was rapidly improved with the instillation of tetracaine. His sclera are somewhat injected. He has mild conjunctivitis. On his left eye he is found to have a corneal abrasion//shallow ulcer. His eye pHs were reasonably normal 7-8. Nonetheless he had an eyewash. He'll be prescribed ocular antibiotics that he was given a bottle of in the department. He is encouraged to follow-up as an outpatient but if unable to he will follow-up in the emergency department for his wound checks to make sure he heals well. I suspect that the rash on his abdomen is also contact dermatitis.  Final diagnoses:   Abrasion of left cornea, initial encounter   Chemical conjunctivitis of both eyes       Documentation assistance provided by chhaya Rutherford  Aris.  Information recorded by the scribe was done at my direction and has been verified and validated by me.     Sangeeta Hurst  07/10/21 1137       Sangeeta Hurst  07/10/21 1211       Sangeeta Hurst  07/10/21 1258       Dominick Morocho DO  07/10/21 2038

## 2021-08-03 ENCOUNTER — HOSPITAL ENCOUNTER (EMERGENCY)
Facility: HOSPITAL | Age: 56
Discharge: HOME | End: 2021-08-03
Attending: EMERGENCY MEDICINE | Admitting: EMERGENCY MEDICINE

## 2021-08-03 VITALS
WEIGHT: 291.01 LBS | RESPIRATION RATE: 12 BRPM | DIASTOLIC BLOOD PRESSURE: 69 MMHG | SYSTOLIC BLOOD PRESSURE: 135 MMHG | TEMPERATURE: 98.7 F | OXYGEN SATURATION: 98 % | BODY MASS INDEX: 44.1 KG/M2 | HEIGHT: 68 IN | HEART RATE: 98 BPM

## 2021-08-03 DIAGNOSIS — R11.0 NAUSEA: ICD-10-CM

## 2021-08-03 DIAGNOSIS — R42 VERTIGO: Primary | ICD-10-CM

## 2021-08-03 LAB
ALBUMIN SERPL-MCNC: 2.7 G/DL (ref 3.5–5.2)
ALBUMIN/GLOB SERPL: 0.7 G/DL
ALP SERPL-CCNC: 127 U/L (ref 39–117)
ALT SERPL W P-5'-P-CCNC: 19 U/L (ref 1–41)
ANION GAP SERPL CALCULATED.3IONS-SCNC: 7.7 MMOL/L (ref 5–15)
AST SERPL-CCNC: 38 U/L (ref 1–40)
BASOPHILS # BLD AUTO: 0.04 10*3/MM3 (ref 0–0.2)
BASOPHILS NFR BLD AUTO: 1 % (ref 0–1.5)
BILIRUB SERPL-MCNC: 1.6 MG/DL (ref 0–1.2)
BILIRUB UR QL STRIP: NEGATIVE
BUN SERPL-MCNC: 7 MG/DL (ref 6–20)
BUN/CREAT SERPL: 10.8 (ref 7–25)
CALCIUM SPEC-SCNC: 8.6 MG/DL (ref 8.6–10.5)
CHLORIDE SERPL-SCNC: 104 MMOL/L (ref 98–107)
CLARITY UR: CLEAR
CO2 SERPL-SCNC: 26.3 MMOL/L (ref 22–29)
COLOR UR: YELLOW
CREAT SERPL-MCNC: 0.65 MG/DL (ref 0.76–1.27)
DEPRECATED RDW RBC AUTO: 58.5 FL (ref 37–54)
EOSINOPHIL # BLD AUTO: 0.14 10*3/MM3 (ref 0–0.4)
EOSINOPHIL NFR BLD AUTO: 3.6 % (ref 0.3–6.2)
ERYTHROCYTE [DISTWIDTH] IN BLOOD BY AUTOMATED COUNT: 18.1 % (ref 12.3–15.4)
GFR SERPL CREATININE-BSD FRML MDRD: 128 ML/MIN/1.73
GLOBULIN UR ELPH-MCNC: 3.7 GM/DL
GLUCOSE SERPL-MCNC: 168 MG/DL (ref 65–99)
GLUCOSE UR STRIP-MCNC: NEGATIVE MG/DL
HCT VFR BLD AUTO: 30.5 % (ref 37.5–51)
HGB BLD-MCNC: 9.9 G/DL (ref 13–17.7)
HGB UR QL STRIP.AUTO: NEGATIVE
HOLD SPECIMEN: NORMAL
HOLD SPECIMEN: NORMAL
IMM GRANULOCYTES # BLD AUTO: 0.01 10*3/MM3 (ref 0–0.05)
IMM GRANULOCYTES NFR BLD AUTO: 0.3 % (ref 0–0.5)
KETONES UR QL STRIP: NEGATIVE
LEUKOCYTE ESTERASE UR QL STRIP.AUTO: NEGATIVE
LIPASE SERPL-CCNC: 20 U/L (ref 13–60)
LYMPHOCYTES # BLD AUTO: 0.83 10*3/MM3 (ref 0.7–3.1)
LYMPHOCYTES NFR BLD AUTO: 21.5 % (ref 19.6–45.3)
MCH RBC QN AUTO: 28.4 PG (ref 26.6–33)
MCHC RBC AUTO-ENTMCNC: 32.5 G/DL (ref 31.5–35.7)
MCV RBC AUTO: 87.6 FL (ref 79–97)
MONOCYTES # BLD AUTO: 0.34 10*3/MM3 (ref 0.1–0.9)
MONOCYTES NFR BLD AUTO: 8.8 % (ref 5–12)
NEUTROPHILS NFR BLD AUTO: 2.5 10*3/MM3 (ref 1.7–7)
NEUTROPHILS NFR BLD AUTO: 64.8 % (ref 42.7–76)
NITRITE UR QL STRIP: NEGATIVE
NRBC BLD AUTO-RTO: 0 /100 WBC (ref 0–0.2)
PH UR STRIP.AUTO: 8.5 [PH] (ref 5–8)
PLATELET # BLD AUTO: 93 10*3/MM3 (ref 140–450)
PMV BLD AUTO: 10.1 FL (ref 6–12)
POTASSIUM SERPL-SCNC: 3.5 MMOL/L (ref 3.5–5.2)
PROT SERPL-MCNC: 6.4 G/DL (ref 6–8.5)
PROT UR QL STRIP: NEGATIVE
RBC # BLD AUTO: 3.48 10*6/MM3 (ref 4.14–5.8)
SODIUM SERPL-SCNC: 138 MMOL/L (ref 136–145)
SP GR UR STRIP: 1.01 (ref 1–1.03)
UROBILINOGEN UR QL STRIP: ABNORMAL
WBC # BLD AUTO: 3.86 10*3/MM3 (ref 3.4–10.8)
WHOLE BLOOD HOLD SPECIMEN: NORMAL

## 2021-08-03 PROCEDURE — 96375 TX/PRO/DX INJ NEW DRUG ADDON: CPT

## 2021-08-03 PROCEDURE — 36415 COLL VENOUS BLD VENIPUNCTURE: CPT

## 2021-08-03 PROCEDURE — 81003 URINALYSIS AUTO W/O SCOPE: CPT | Performed by: EMERGENCY MEDICINE

## 2021-08-03 PROCEDURE — 85025 COMPLETE CBC W/AUTO DIFF WBC: CPT | Performed by: EMERGENCY MEDICINE

## 2021-08-03 PROCEDURE — 96374 THER/PROPH/DIAG INJ IV PUSH: CPT

## 2021-08-03 PROCEDURE — 83690 ASSAY OF LIPASE: CPT | Performed by: EMERGENCY MEDICINE

## 2021-08-03 PROCEDURE — 93005 ELECTROCARDIOGRAM TRACING: CPT | Performed by: EMERGENCY MEDICINE

## 2021-08-03 PROCEDURE — 25010000002 KETOROLAC TROMETHAMINE PER 15 MG: Performed by: EMERGENCY MEDICINE

## 2021-08-03 PROCEDURE — 25010000002 LORAZEPAM PER 2 MG: Performed by: EMERGENCY MEDICINE

## 2021-08-03 PROCEDURE — 99283 EMERGENCY DEPT VISIT LOW MDM: CPT

## 2021-08-03 PROCEDURE — 80053 COMPREHEN METABOLIC PANEL: CPT | Performed by: EMERGENCY MEDICINE

## 2021-08-03 PROCEDURE — 25010000002 ONDANSETRON PER 1 MG: Performed by: EMERGENCY MEDICINE

## 2021-08-03 RX ORDER — ONDANSETRON 2 MG/ML
4 INJECTION INTRAMUSCULAR; INTRAVENOUS ONCE
Status: COMPLETED | OUTPATIENT
Start: 2021-08-03 | End: 2021-08-03

## 2021-08-03 RX ORDER — SODIUM CHLORIDE 0.9 % (FLUSH) 0.9 %
10 SYRINGE (ML) INJECTION AS NEEDED
Status: DISCONTINUED | OUTPATIENT
Start: 2021-08-03 | End: 2021-08-03

## 2021-08-03 RX ORDER — ONDANSETRON 4 MG/1
4 TABLET, ORALLY DISINTEGRATING ORAL EVERY 8 HOURS PRN
Qty: 9 TABLET | Refills: 0 | Status: SHIPPED | OUTPATIENT
Start: 2021-08-03 | End: 2021-10-16

## 2021-08-03 RX ORDER — MECLIZINE HYDROCHLORIDE 25 MG/1
25 TABLET ORAL 3 TIMES DAILY PRN
Qty: 30 TABLET | Refills: 0 | Status: SHIPPED | OUTPATIENT
Start: 2021-08-03 | End: 2021-10-16

## 2021-08-03 RX ORDER — LORAZEPAM 2 MG/ML
1 INJECTION INTRAMUSCULAR ONCE
Status: COMPLETED | OUTPATIENT
Start: 2021-08-03 | End: 2021-08-03

## 2021-08-03 RX ORDER — KETOROLAC TROMETHAMINE 15 MG/ML
15 INJECTION, SOLUTION INTRAMUSCULAR; INTRAVENOUS ONCE
Status: COMPLETED | OUTPATIENT
Start: 2021-08-03 | End: 2021-08-03

## 2021-08-03 RX ADMIN — ONDANSETRON 4 MG: 2 INJECTION INTRAMUSCULAR; INTRAVENOUS at 13:16

## 2021-08-03 RX ADMIN — SODIUM CHLORIDE, PRESERVATIVE FREE 10 ML: 5 INJECTION INTRAVENOUS at 13:23

## 2021-08-03 RX ADMIN — SODIUM CHLORIDE 1000 ML: 9 INJECTION, SOLUTION INTRAVENOUS at 13:53

## 2021-08-03 RX ADMIN — KETOROLAC TROMETHAMINE 15 MG: 15 INJECTION, SOLUTION INTRAMUSCULAR; INTRAVENOUS at 13:21

## 2021-08-03 RX ADMIN — LORAZEPAM 1 MG: 2 INJECTION INTRAMUSCULAR; INTRAVENOUS at 13:17

## 2021-08-03 NOTE — ED PROVIDER NOTES
"Subjective   Nic Nicolas is a 56 y.o. male who presents to the emergency department today with complaints of dizziness since 2000 yesterday. Pt states pain is exacerbated with turning his head side to side as well as when ambulating. He states \"its like the room is spinning. I am seeing 4 or 5 of everything.\" He complains of associated headache, mild neck pain, and poor balance. Additionally pt notes he recently started antibiotics for a UTI. He denies falls, back pain, chills, diaphoresis, confusion, numbness, or any other pertinent sx or concerns.           Review of Systems   Constitutional: Negative for chills and fever.   HENT: Negative for nosebleeds.    Eyes: Negative for redness.   Respiratory: Negative for cough and shortness of breath.    Cardiovascular: Negative for chest pain.   Gastrointestinal: Negative for diarrhea and vomiting.   Genitourinary: Negative for dysuria and frequency.   Musculoskeletal: Positive for neck pain. Negative for back pain.   Skin: Negative for rash.   Neurological: Positive for dizziness and headaches. Negative for seizures.       Past Medical History:   Diagnosis Date   • Asthma    • Cirrhosis (CMS/HCC)    • Colon polyps    • Diabetes mellitus (CMS/HCC)    • Hypertension    • Liver disease    • Reflux esophagitis    • Renal disorder    • Sleep apnea        No Known Allergies    Past Surgical History:   Procedure Laterality Date   • COLONOSCOPY  2018, 2020   • ENDOSCOPY  2019   • LEG SURGERY     • PELVIC FRACTURE SURGERY         Family History   Problem Relation Age of Onset   • Stomach cancer Sister        Social History     Socioeconomic History   • Marital status:      Spouse name: Not on file   • Number of children: Not on file   • Years of education: Not on file   • Highest education level: Not on file   Tobacco Use   • Smoking status: Never Smoker   Substance and Sexual Activity   • Alcohol use: Never   • Drug use: Never         Objective   Physical " Exam  Vitals and nursing note reviewed.   Constitutional:       General: He is not in acute distress.  HENT:      Head: Normocephalic and atraumatic.      Nose: Nose normal.      Mouth/Throat:      Mouth: Mucous membranes are moist.   Eyes:      General: No scleral icterus.  Cardiovascular:      Rate and Rhythm: Normal rate and regular rhythm.      Heart sounds: Normal heart sounds. No murmur heard.     Pulmonary:      Effort: No respiratory distress.      Breath sounds: Normal breath sounds.   Abdominal:      Palpations: Abdomen is soft.      Tenderness: There is no abdominal tenderness.   Musculoskeletal:         General: No tenderness. Normal range of motion.      Cervical back: Normal range of motion and neck supple. No tenderness.      Right lower leg: No edema.      Left lower leg: No edema.   Skin:     General: Skin is warm and dry.   Neurological:      General: No focal deficit present.      Mental Status: He is alert.      Sensory: No sensory deficit.      Motor: No weakness.   Psychiatric:         Behavior: Behavior normal.         Procedures         ED Course     Labs Reviewed   COMPREHENSIVE METABOLIC PANEL - Abnormal; Notable for the following components:       Result Value    Glucose 168 (*)     Creatinine 0.65 (*)     Albumin 2.70 (*)     Alkaline Phosphatase 127 (*)     Total Bilirubin 1.6 (*)     All other components within normal limits    Narrative:     GFR Normal >60  Chronic Kidney Disease <60  Kidney Failure <15     URINALYSIS W/ MICROSCOPIC IF INDICATED (NO CULTURE) - Abnormal; Notable for the following components:    pH, UA 8.5 (*)     All other components within normal limits    Narrative:     Urine microscopic not indicated.   CBC WITH AUTO DIFFERENTIAL - Abnormal; Notable for the following components:    RBC 3.48 (*)     Hemoglobin 9.9 (*)     Hematocrit 30.5 (*)     RDW 18.1 (*)     RDW-SD 58.5 (*)     Platelets 93 (*)     All other components within normal limits   LIPASE - Normal    RAINBOW DRAW    Narrative:     The following orders were created for panel order [place] Draw.  Procedure                               Abnormality         Status                     ---------                               -----------         ------                     Green Top (Gel)[[id number]]                                  Final result               Lavender Top[[id number]]                                     Final result               Gold Top - SST[[id number]]                                   Final result                 Please view results for these tests on the individual orders.   CBC AND DIFFERENTIAL    Narrative:     The following orders were created for panel order CBC & Differential.  Procedure                               Abnormality         Status                     ---------                               -----------         ------                     CBC Auto Differential[[id number]]        Abnormal            Final result                 Please view results for these tests on the individual orders.   GREEN TOP   LAVENDER TOP   GOLD TOP - SST     No Radiology Exams Resulted Within Past 24 Hours                                           MDM  Number of Diagnoses or Management Options     Amount and/or Complexity of Data Reviewed  Clinical lab tests: reviewed  Tests in the medicine section of CPT®: reviewed  Decide to obtain previous medical records or to obtain history from someone other than the patient: yes  Review and summarize past medical records: yes      56-year-old male presents with vertigo and associated nausea and weakness.  He is found to be chronically anemic.  Based on his presentation I think his symptoms are most consistent with benign paroxysmal positional vertigo and I have treated him accordingly.  The patient did respond to therapy well and was able to turn his head and ambulate without difficulty.  Vital signs otherwise stable.  Differential diagnosis includes CVA, Ménière's  disease, labyrinthitis, medication side effect, and vestibular neuronitis among others.  Final diagnoses:   Vertigo   Nausea       Documentation assistance provided by chhaya Jensen.  Information recorded by the scribe was done at my direction and has been verified and validated by me.     Aleksandra Jensen  08/03/21 1222       Dominick Morocho DO  08/03/21 2570

## 2021-08-06 LAB — QT INTERVAL: 421 MS

## 2021-08-13 ENCOUNTER — HOSPITAL ENCOUNTER (EMERGENCY)
Facility: HOSPITAL | Age: 56
Discharge: HOME | End: 2021-08-13
Attending: EMERGENCY MEDICINE | Admitting: EMERGENCY MEDICINE

## 2021-08-13 ENCOUNTER — APPOINTMENT (OUTPATIENT)
Dept: CT IMAGING | Facility: HOSPITAL | Age: 56
End: 2021-08-13

## 2021-08-13 VITALS
OXYGEN SATURATION: 98 % | RESPIRATION RATE: 20 BRPM | HEART RATE: 88 BPM | WEIGHT: 259.7 LBS | SYSTOLIC BLOOD PRESSURE: 168 MMHG | DIASTOLIC BLOOD PRESSURE: 72 MMHG | HEIGHT: 68 IN | BODY MASS INDEX: 39.36 KG/M2 | TEMPERATURE: 98.1 F

## 2021-08-13 DIAGNOSIS — S30.1XXA CONTUSION OF ABDOMINAL WALL, INITIAL ENCOUNTER: ICD-10-CM
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
## 2021-08-13 DIAGNOSIS — S20.219A CONTUSION OF CHEST WALL, UNSPECIFIED LATERALITY, INITIAL ENCOUNTER: ICD-10-CM

## 2021-08-13 LAB
ALBUMIN SERPL-MCNC: 3 G/DL (ref 3.5–5.2)
ALBUMIN/GLOB SERPL: 0.9 G/DL
ALP SERPL-CCNC: 138 U/L (ref 39–117)
ALT SERPL W P-5'-P-CCNC: 20 U/L (ref 1–41)
ANION GAP SERPL CALCULATED.3IONS-SCNC: 10.2 MMOL/L (ref 5–15)
AST SERPL-CCNC: 41 U/L (ref 1–40)
BASOPHILS # BLD AUTO: 0.03 10*3/MM3 (ref 0–0.2)
BASOPHILS NFR BLD AUTO: 0.7 % (ref 0–1.5)
BILIRUB SERPL-MCNC: 2.1 MG/DL (ref 0–1.2)
BUN SERPL-MCNC: 9 MG/DL (ref 6–20)
BUN/CREAT SERPL: 12.7 (ref 7–25)
CALCIUM SPEC-SCNC: 8.3 MG/DL (ref 8.6–10.5)
CHLORIDE SERPL-SCNC: 103 MMOL/L (ref 98–107)
CO2 SERPL-SCNC: 21.8 MMOL/L (ref 22–29)
CREAT SERPL-MCNC: 0.71 MG/DL (ref 0.76–1.27)
DEPRECATED RDW RBC AUTO: 56.2 FL (ref 37–54)
EOSINOPHIL # BLD AUTO: 0.09 10*3/MM3 (ref 0–0.4)
EOSINOPHIL NFR BLD AUTO: 2 % (ref 0.3–6.2)
ERYTHROCYTE [DISTWIDTH] IN BLOOD BY AUTOMATED COUNT: 17.3 % (ref 12.3–15.4)
GFR SERPL CREATININE-BSD FRML MDRD: 115 ML/MIN/1.73
GLOBULIN UR ELPH-MCNC: 3.5 GM/DL
GLUCOSE SERPL-MCNC: 274 MG/DL (ref 65–99)
HCT VFR BLD AUTO: 30.9 % (ref 37.5–51)
HGB BLD-MCNC: 10.1 G/DL (ref 13–17.7)
IMM GRANULOCYTES # BLD AUTO: 0.01 10*3/MM3 (ref 0–0.05)
IMM GRANULOCYTES NFR BLD AUTO: 0.2 % (ref 0–0.5)
INR PPP: 1.8 (ref 2–3)
LYMPHOCYTES # BLD AUTO: 0.86 10*3/MM3 (ref 0.7–3.1)
LYMPHOCYTES NFR BLD AUTO: 19.4 % (ref 19.6–45.3)
MCH RBC QN AUTO: 29.1 PG (ref 26.6–33)
MCHC RBC AUTO-ENTMCNC: 32.7 G/DL (ref 31.5–35.7)
MCV RBC AUTO: 89 FL (ref 79–97)
MONOCYTES # BLD AUTO: 0.39 10*3/MM3 (ref 0.1–0.9)
MONOCYTES NFR BLD AUTO: 8.8 % (ref 5–12)
NEUTROPHILS NFR BLD AUTO: 3.06 10*3/MM3 (ref 1.7–7)
NEUTROPHILS NFR BLD AUTO: 68.9 % (ref 42.7–76)
NRBC BLD AUTO-RTO: 0 /100 WBC (ref 0–0.2)
PLATELET # BLD AUTO: 96 10*3/MM3 (ref 140–450)
PMV BLD AUTO: 11.5 FL (ref 6–12)
POTASSIUM SERPL-SCNC: 3.5 MMOL/L (ref 3.5–5.2)
PROT SERPL-MCNC: 6.5 G/DL (ref 6–8.5)
PROTHROMBIN TIME: 18.4 SECONDS (ref 9.4–12)
RBC # BLD AUTO: 3.47 10*6/MM3 (ref 4.14–5.8)
SODIUM SERPL-SCNC: 135 MMOL/L (ref 136–145)
WBC # BLD AUTO: 4.44 10*3/MM3 (ref 3.4–10.8)

## 2021-08-13 PROCEDURE — 71260 CT THORAX DX C+: CPT

## 2021-08-13 PROCEDURE — 25010000002 ONDANSETRON PER 1 MG: Performed by: EMERGENCY MEDICINE

## 2021-08-13 PROCEDURE — 96375 TX/PRO/DX INJ NEW DRUG ADDON: CPT

## 2021-08-13 PROCEDURE — 74177 CT ABD & PELVIS W/CONTRAST: CPT

## 2021-08-13 PROCEDURE — 80053 COMPREHEN METABOLIC PANEL: CPT | Performed by: NURSE PRACTITIONER

## 2021-08-13 PROCEDURE — 96374 THER/PROPH/DIAG INJ IV PUSH: CPT

## 2021-08-13 PROCEDURE — 25010000002 HYDROMORPHONE 1 MG/ML SOLUTION: Performed by: EMERGENCY MEDICINE

## 2021-08-13 PROCEDURE — 85610 PROTHROMBIN TIME: CPT | Performed by: NURSE PRACTITIONER

## 2021-08-13 PROCEDURE — 85025 COMPLETE CBC W/AUTO DIFF WBC: CPT | Performed by: NURSE PRACTITIONER

## 2021-08-13 PROCEDURE — 0 IOPAMIDOL PER 1 ML: Performed by: EMERGENCY MEDICINE

## 2021-08-13 PROCEDURE — 99283 EMERGENCY DEPT VISIT LOW MDM: CPT

## 2021-08-13 RX ORDER — ONDANSETRON 2 MG/ML
4 INJECTION INTRAMUSCULAR; INTRAVENOUS ONCE
Status: COMPLETED | OUTPATIENT
Start: 2021-08-13 | End: 2021-08-13

## 2021-08-13 RX ORDER — DIFLUNISAL 500 MG/1
500 TABLET, FILM COATED ORAL 2 TIMES DAILY
Qty: 60 TABLET | Refills: 0 | Status: SHIPPED | OUTPATIENT
Start: 2021-08-13 | End: 2021-11-02

## 2021-08-13 RX ADMIN — ONDANSETRON 4 MG: 2 INJECTION INTRAMUSCULAR; INTRAVENOUS at 14:05

## 2021-08-13 RX ADMIN — SODIUM CHLORIDE 1000 ML: 9 INJECTION, SOLUTION INTRAVENOUS at 14:00

## 2021-08-13 RX ADMIN — IOPAMIDOL 100 ML: 755 INJECTION, SOLUTION INTRAVENOUS at 14:49

## 2021-08-13 RX ADMIN — HYDROMORPHONE HYDROCHLORIDE 1 MG: 1 INJECTION, SOLUTION INTRAMUSCULAR; INTRAVENOUS; SUBCUTANEOUS at 14:01

## 2021-08-13 NOTE — DISCHARGE INSTRUCTIONS
Apply ice to sore areas 20 minutes at a time 3 times daily take medications as directed.  Follow-up with your doctor in 3 days.
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
## 2021-08-26 VITALS
DIASTOLIC BLOOD PRESSURE: 77 MMHG | BODY MASS INDEX: 39.49 KG/M2 | SYSTOLIC BLOOD PRESSURE: 140 MMHG | OXYGEN SATURATION: 98 % | TEMPERATURE: 98.6 F | HEART RATE: 97 BPM | RESPIRATION RATE: 20 BRPM | HEIGHT: 68 IN

## 2021-08-26 LAB
ALBUMIN SERPL-MCNC: 2.7 G/DL (ref 3.5–5.2)
ALBUMIN/GLOB SERPL: 0.9 G/DL
ALP SERPL-CCNC: 130 U/L (ref 39–117)
ALT SERPL W P-5'-P-CCNC: 17 U/L (ref 1–41)
ANION GAP SERPL CALCULATED.3IONS-SCNC: 5.8 MMOL/L (ref 5–15)
AST SERPL-CCNC: 32 U/L (ref 1–40)
BASOPHILS # BLD AUTO: 0.05 10*3/MM3 (ref 0–0.2)
BASOPHILS NFR BLD AUTO: 1.2 % (ref 0–1.5)
BILIRUB SERPL-MCNC: 1.6 MG/DL (ref 0–1.2)
BUN SERPL-MCNC: 12 MG/DL (ref 6–20)
BUN/CREAT SERPL: 14 (ref 7–25)
CALCIUM SPEC-SCNC: 9.3 MG/DL (ref 8.6–10.5)
CHLORIDE SERPL-SCNC: 103 MMOL/L (ref 98–107)
CO2 SERPL-SCNC: 28.2 MMOL/L (ref 22–29)
CREAT SERPL-MCNC: 0.86 MG/DL (ref 0.76–1.27)
DEPRECATED RDW RBC AUTO: 51.6 FL (ref 37–54)
EOSINOPHIL # BLD AUTO: 0.18 10*3/MM3 (ref 0–0.4)
EOSINOPHIL NFR BLD AUTO: 4.2 % (ref 0.3–6.2)
ERYTHROCYTE [DISTWIDTH] IN BLOOD BY AUTOMATED COUNT: 15.7 % (ref 12.3–15.4)
GFR SERPL CREATININE-BSD FRML MDRD: 92 ML/MIN/1.73
GLOBULIN UR ELPH-MCNC: 3.1 GM/DL
GLUCOSE SERPL-MCNC: 284 MG/DL (ref 65–99)
HCT VFR BLD AUTO: 29.5 % (ref 37.5–51)
HGB BLD-MCNC: 9.7 G/DL (ref 13–17.7)
IMM GRANULOCYTES # BLD AUTO: 0.01 10*3/MM3 (ref 0–0.05)
IMM GRANULOCYTES NFR BLD AUTO: 0.2 % (ref 0–0.5)
LYMPHOCYTES # BLD AUTO: 1.03 10*3/MM3 (ref 0.7–3.1)
LYMPHOCYTES NFR BLD AUTO: 24.2 % (ref 19.6–45.3)
MCH RBC QN AUTO: 29.3 PG (ref 26.6–33)
MCHC RBC AUTO-ENTMCNC: 32.9 G/DL (ref 31.5–35.7)
MCV RBC AUTO: 89.1 FL (ref 79–97)
MONOCYTES # BLD AUTO: 0.51 10*3/MM3 (ref 0.1–0.9)
MONOCYTES NFR BLD AUTO: 12 % (ref 5–12)
NEUTROPHILS NFR BLD AUTO: 2.47 10*3/MM3 (ref 1.7–7)
NEUTROPHILS NFR BLD AUTO: 58.2 % (ref 42.7–76)
NRBC BLD AUTO-RTO: 0 /100 WBC (ref 0–0.2)
NT-PROBNP SERPL-MCNC: 158.6 PG/ML (ref 0–900)
PLATELET # BLD AUTO: 80 10*3/MM3 (ref 140–450)
PMV BLD AUTO: 11.5 FL (ref 6–12)
POTASSIUM SERPL-SCNC: 4 MMOL/L (ref 3.5–5.2)
PROT SERPL-MCNC: 5.8 G/DL (ref 6–8.5)
RBC # BLD AUTO: 3.31 10*6/MM3 (ref 4.14–5.8)
SODIUM SERPL-SCNC: 137 MMOL/L (ref 136–145)
WBC # BLD AUTO: 4.25 10*3/MM3 (ref 3.4–10.8)

## 2021-08-26 PROCEDURE — 80053 COMPREHEN METABOLIC PANEL: CPT | Performed by: NURSE PRACTITIONER

## 2021-08-26 PROCEDURE — 36415 COLL VENOUS BLD VENIPUNCTURE: CPT | Performed by: NURSE PRACTITIONER

## 2021-08-26 PROCEDURE — 99283 EMERGENCY DEPT VISIT LOW MDM: CPT

## 2021-08-26 PROCEDURE — 83880 ASSAY OF NATRIURETIC PEPTIDE: CPT | Performed by: NURSE PRACTITIONER

## 2021-08-26 PROCEDURE — 85025 COMPLETE CBC W/AUTO DIFF WBC: CPT | Performed by: NURSE PRACTITIONER

## 2021-08-27 ENCOUNTER — HOSPITAL ENCOUNTER (EMERGENCY)
Facility: HOSPITAL | Age: 56
Discharge: HOME | End: 2021-08-27
Attending: EMERGENCY MEDICINE | Admitting: EMERGENCY MEDICINE

## 2021-08-27 DIAGNOSIS — G62.9 NEUROPATHY: ICD-10-CM

## 2021-08-27 DIAGNOSIS — R60.0 LOWER EXTREMITY EDEMA: Primary | ICD-10-CM

## 2021-08-27 LAB
AMMONIA BLD-SCNC: 63 UMOL/L (ref 16–60)
AMPHET+METHAMPHET UR QL: NEGATIVE
BARBITURATES UR QL SCN: NEGATIVE
BENZODIAZ UR QL SCN: NEGATIVE
BILIRUB UR QL STRIP: NEGATIVE
CANNABINOIDS SERPL QL: NEGATIVE
CLARITY UR: CLEAR
COCAINE UR QL: NEGATIVE
COLOR UR: YELLOW
GLUCOSE UR STRIP-MCNC: NEGATIVE MG/DL
HGB UR QL STRIP.AUTO: NEGATIVE
KETONES UR QL STRIP: NEGATIVE
LEUKOCYTE ESTERASE UR QL STRIP.AUTO: NEGATIVE
METHADONE UR QL SCN: NEGATIVE
NITRITE UR QL STRIP: NEGATIVE
OPIATES UR QL: NEGATIVE
OXYCODONE UR QL SCN: POSITIVE
PH UR STRIP.AUTO: 7 [PH] (ref 5–8)
PROT UR QL STRIP: NEGATIVE
SP GR UR STRIP: <=1.005 (ref 1–1.03)
UROBILINOGEN UR QL STRIP: NORMAL

## 2021-08-27 PROCEDURE — 80307 DRUG TEST PRSMV CHEM ANLYZR: CPT | Performed by: EMERGENCY MEDICINE

## 2021-08-27 PROCEDURE — 81003 URINALYSIS AUTO W/O SCOPE: CPT | Performed by: EMERGENCY MEDICINE

## 2021-08-27 PROCEDURE — 82140 ASSAY OF AMMONIA: CPT | Performed by: EMERGENCY MEDICINE

## 2021-08-27 RX ORDER — HYDROCODONE BITARTRATE AND ACETAMINOPHEN 7.5; 325 MG/1; MG/1
1 TABLET ORAL ONCE
Status: COMPLETED | OUTPATIENT
Start: 2021-08-27 | End: 2021-08-27

## 2021-08-27 RX ADMIN — HYDROCODONE BITARTRATE AND ACETAMINOPHEN 1 TABLET: 7.5; 325 TABLET ORAL at 03:20

## 2021-10-16 ENCOUNTER — HOSPITAL ENCOUNTER (EMERGENCY)
Facility: HOSPITAL | Age: 56
Discharge: HOME | End: 2021-10-16
Attending: EMERGENCY MEDICINE | Admitting: EMERGENCY MEDICINE

## 2021-10-16 VITALS
BODY MASS INDEX: 42.4 KG/M2 | OXYGEN SATURATION: 100 % | RESPIRATION RATE: 16 BRPM | WEIGHT: 279.76 LBS | HEIGHT: 68 IN | DIASTOLIC BLOOD PRESSURE: 85 MMHG | HEART RATE: 102 BPM | SYSTOLIC BLOOD PRESSURE: 156 MMHG | TEMPERATURE: 98.6 F

## 2021-10-16 DIAGNOSIS — M54.50 CHRONIC BILATERAL LOW BACK PAIN WITHOUT SCIATICA: Primary | ICD-10-CM

## 2021-10-16 DIAGNOSIS — Z76.0 ENCOUNTER FOR MEDICATION REFILL: ICD-10-CM

## 2021-10-16 DIAGNOSIS — G89.29 CHRONIC BILATERAL LOW BACK PAIN WITHOUT SCIATICA: Primary | ICD-10-CM

## 2021-10-16 PROCEDURE — 25010000002 HYDROMORPHONE 1 MG/ML SOLUTION: Performed by: EMERGENCY MEDICINE

## 2021-10-16 PROCEDURE — 63710000001 ONDANSETRON ODT 4 MG TABLET DISPERSIBLE: Performed by: EMERGENCY MEDICINE

## 2021-10-16 PROCEDURE — 99283 EMERGENCY DEPT VISIT LOW MDM: CPT

## 2021-10-16 PROCEDURE — 96372 THER/PROPH/DIAG INJ SC/IM: CPT

## 2021-10-16 RX ORDER — ONDANSETRON 4 MG/1
4 TABLET, ORALLY DISINTEGRATING ORAL 4 TIMES DAILY PRN
Qty: 30 TABLET | Refills: 0 | Status: SHIPPED | OUTPATIENT
Start: 2021-10-16 | End: 2021-11-02 | Stop reason: SDUPTHER

## 2021-10-16 RX ORDER — PROPRANOLOL HYDROCHLORIDE 20 MG/1
20 TABLET ORAL 2 TIMES DAILY
Qty: 60 TABLET | Refills: 0 | Status: SHIPPED | OUTPATIENT
Start: 2021-10-16 | End: 2021-11-02 | Stop reason: SDUPTHER

## 2021-10-16 RX ORDER — CYCLOBENZAPRINE HCL 10 MG
10 TABLET ORAL 3 TIMES DAILY PRN
Qty: 12 TABLET | Refills: 0 | Status: SHIPPED | OUTPATIENT
Start: 2021-10-16 | End: 2021-11-02 | Stop reason: SDUPTHER

## 2021-10-16 RX ORDER — OMEPRAZOLE 20 MG/1
20 CAPSULE, DELAYED RELEASE ORAL DAILY
Qty: 30 CAPSULE | Refills: 0 | Status: SHIPPED | OUTPATIENT
Start: 2021-10-16 | End: 2021-11-02 | Stop reason: SDUPTHER

## 2021-10-16 RX ORDER — BACLOFEN 10 MG/1
10 TABLET ORAL 3 TIMES DAILY
Qty: 20 TABLET | Refills: 0 | Status: SHIPPED | OUTPATIENT
Start: 2021-10-16 | End: 2021-11-02 | Stop reason: SDUPTHER

## 2021-10-16 RX ORDER — OXYCODONE AND ACETAMINOPHEN 10; 325 MG/1; MG/1
1 TABLET ORAL EVERY 6 HOURS PRN
Qty: 12 TABLET | Refills: 0 | Status: SHIPPED | OUTPATIENT
Start: 2021-10-16 | End: 2021-10-19

## 2021-10-16 RX ORDER — ALBUTEROL SULFATE 90 UG/1
2 AEROSOL, METERED RESPIRATORY (INHALATION) EVERY 4 HOURS PRN
Qty: 90 G | Refills: 0 | Status: SHIPPED | OUTPATIENT
Start: 2021-10-16 | End: 2021-11-02 | Stop reason: SDUPTHER

## 2021-10-16 RX ORDER — BUSPIRONE HYDROCHLORIDE 5 MG/1
5 TABLET ORAL 3 TIMES DAILY
Qty: 30 TABLET | Refills: 0 | Status: SHIPPED | OUTPATIENT
Start: 2021-10-16 | End: 2021-11-02 | Stop reason: SDUPTHER

## 2021-10-16 RX ORDER — FUROSEMIDE 40 MG/1
40 TABLET ORAL 3 TIMES DAILY
Qty: 90 TABLET | Refills: 0 | Status: SHIPPED | OUTPATIENT
Start: 2021-10-16 | End: 2021-11-02 | Stop reason: SDUPTHER

## 2021-10-16 RX ORDER — GLIMEPIRIDE 1 MG/1
1 TABLET ORAL
Qty: 30 TABLET | Refills: 0 | Status: SHIPPED | OUTPATIENT
Start: 2021-10-16 | End: 2021-11-02 | Stop reason: SDUPTHER

## 2021-10-16 RX ORDER — MECLIZINE HYDROCHLORIDE 25 MG/1
25 TABLET ORAL 3 TIMES DAILY PRN
Qty: 30 TABLET | Refills: 0 | Status: SHIPPED | OUTPATIENT
Start: 2021-10-16 | End: 2021-11-02

## 2021-10-16 RX ORDER — AMLODIPINE BESYLATE 10 MG/1
10 TABLET ORAL DAILY
Qty: 30 TABLET | Refills: 0 | Status: SHIPPED | OUTPATIENT
Start: 2021-10-16 | End: 2021-11-02 | Stop reason: SDUPTHER

## 2021-10-16 RX ORDER — ONDANSETRON 4 MG/1
4 TABLET, ORALLY DISINTEGRATING ORAL ONCE
Status: COMPLETED | OUTPATIENT
Start: 2021-10-16 | End: 2021-10-16

## 2021-10-16 RX ORDER — SPIRONOLACTONE 50 MG/1
100 TABLET, FILM COATED ORAL DAILY
Qty: 30 TABLET | Refills: 0 | Status: SHIPPED | OUTPATIENT
Start: 2021-10-16 | End: 2021-11-02

## 2021-10-16 RX ADMIN — HYDROMORPHONE HYDROCHLORIDE 1 MG: 1 INJECTION, SOLUTION INTRAMUSCULAR; INTRAVENOUS; SUBCUTANEOUS at 13:50

## 2021-10-16 RX ADMIN — ONDANSETRON 4 MG: 4 TABLET, ORALLY DISINTEGRATING ORAL at 13:51

## 2021-10-16 NOTE — ED PROVIDER NOTES
Subjective   56-year-old male presents to the emergency department with complaints of back pain, which is chronic for the last several days.  Patient reports recently moving to the area, losing touch with his primary care provider due to the office closing.  He reports being on multiple medications chronically, he is out of refills for all of them.  He denies any recent injuries or illnesses, states that his back pain is chronic and uncontrolled due to unfilled pain control medications.  Patient does not wish to have any other evaluations done at this time, he states he is terminally ill with liver cirrhosis.  He states he is only here for symptomatic treatment of pain and medication refills at this time.  He reports no bladder or bowel incontinence, chest pain, abdominal pain, bowel or bladder complaints.  He is resting comfortably in the room at the administration of medications, vitals were stable throughout the entirety of his stay here.  Tachycardia and increased respiratory rate was improved after the administration of pain medications.      History provided by:  Patient   used: No        Review of Systems   Constitutional: Negative for chills and fever.   HENT: Negative for congestion, ear pain and sore throat.    Eyes: Negative for pain.   Respiratory: Negative for cough, chest tightness and shortness of breath.    Cardiovascular: Negative for chest pain.   Gastrointestinal: Negative for abdominal pain, diarrhea, nausea and vomiting.   Genitourinary: Negative for flank pain and hematuria.   Musculoskeletal: Positive for back pain. Negative for joint swelling.   Skin: Negative for pallor.   Neurological: Negative for seizures and headaches.   All other systems reviewed and are negative.      Past Medical History:   Diagnosis Date   • Asthma    • Cirrhosis (HCC)    • Colon polyps    • Diabetes mellitus (HCC)    • Hypertension    • Liver disease    • Reflux esophagitis    • Renal disorder     • Sleep apnea        No Known Allergies    Past Surgical History:   Procedure Laterality Date   • COLONOSCOPY  2018, 2020   • ENDOSCOPY  2019   • LEG SURGERY     • PELVIC FRACTURE SURGERY         Family History   Problem Relation Age of Onset   • Stomach cancer Sister        Social History     Socioeconomic History   • Marital status:    Tobacco Use   • Smoking status: Never Smoker   Substance and Sexual Activity   • Alcohol use: Never   • Drug use: Never           Objective   Physical Exam  Vitals and nursing note reviewed.   Constitutional:       General: He is not in acute distress.     Appearance: Normal appearance. He is not toxic-appearing.   HENT:      Head: Normocephalic and atraumatic.      Mouth/Throat:      Mouth: Mucous membranes are moist.   Eyes:      Extraocular Movements: Extraocular movements intact.      Pupils: Pupils are equal, round, and reactive to light.   Cardiovascular:      Rate and Rhythm: Normal rate and regular rhythm.      Pulses: Normal pulses.      Heart sounds: Normal heart sounds.   Pulmonary:      Effort: Pulmonary effort is normal. No respiratory distress.      Breath sounds: Normal breath sounds.   Abdominal:      General: Abdomen is flat.      Palpations: Abdomen is soft.      Tenderness: There is no abdominal tenderness.   Musculoskeletal:         General: No swelling, deformity or signs of injury. Normal range of motion.      Cervical back: Normal, normal range of motion and neck supple.      Thoracic back: Normal.      Lumbar back: Spasms and tenderness present. No swelling, deformity, signs of trauma or bony tenderness. Normal range of motion. Negative right straight leg raise test and negative left straight leg raise test.      Right lower leg: No edema.      Left lower leg: No edema.   Skin:     General: Skin is warm and dry.   Neurological:      General: No focal deficit present.      Mental Status: He is alert and oriented to person, place, and time. Mental  status is at baseline.         Procedures           ED Course                                           MDM  Number of Diagnoses or Management Options  Chronic bilateral low back pain without sciatica  Encounter for medication refill  Diagnosis management comments: The patient´s symptoms are consistent with chronic musculoskeletal back pain. The patient is now resting comfortably, feels better, is alert, talkative, interactive and in no distress. The repeat examination is unremarkable and benign.  Pain is under control. The patient is neurologically intact and is ambulatory in the ED. The patient has no fever, no bowel or bladder incontinence, no saddle anesthesia, and is otherwise alert and well appearing. The history and physical exam do not suggest the presence of acute spinal epidural abscess, acute spinal epidural bleed, cauda equina syndrome, abdominal aortic aneurysm, aortic dissection or other process requiring further testing, treatment or consultation in the emergency department.  Patient deferred to have imaging or lab work done at this time due to chronic nature of events.  He was requesting medication refills on all of his chronic meds.   has been in informed of patient needs requesting sooner appointment with primary care provider, they will call the patient on Monday for new appointment. The vital signs have been stable. The patient's condition is stable and appropriate for discharge. The patient will pursue further outpatient evaluation with the primary care physician or other designated for consulting position as indicated in the discharge instructions.  He was encouraged to return to the emergency department for any new or worsening symptoms.  Patient verbalized understanding and agreement with following treatment plan.    Risk of Complications, Morbidity, and/or Mortality  Presenting problems: low  Diagnostic procedures: low  Management options: low    Patient Progress  Patient  progress: stable      Final diagnoses:   Chronic bilateral low back pain without sciatica   Encounter for medication refill       ED Disposition  ED Disposition     ED Disposition Condition Comment    Discharge Stable           Bernadette Sangeeta  [place] [address] [phone]    Schedule an appointment as soon as possible for a visit   As needed, If symptoms worsen         Medication List      New Prescriptions    albuterol sulfate  (90 Base) MCG/ACT inhaler  Commonly known as: PROVENTIL HFA;VENTOLIN HFA;PROAIR HFA  Inhale 2 puffs Every 4 (Four) Hours As Needed for Wheezing.     baclofen 10 MG tablet  Commonly known as: LIORESAL  Take 1 tablet by mouth 3 (Three) Times a Day.     busPIRone 5 MG tablet  Commonly known as: BUSPAR  Take 1 tablet by mouth 3 (Three) Times a Day.     cyclobenzaprine 10 MG tablet  Commonly known as: FLEXERIL  Take 1 tablet by mouth 3 (Three) Times a Day As Needed for Muscle Spasms.     furosemide 40 MG tablet  Commonly known as: LASIX  Take 1 tablet by mouth 3 (Three) Times a Day for 30 days.     glimepiride 1 MG tablet  Commonly known as: Amaryl  Take 1 tablet by mouth Every Morning Before Breakfast for 30 days.     Insulin Glargine 100 UNIT/ML injection pen  Commonly known as: LANTUS SOLOSTAR  Inject 100 Units under the skin into the appropriate area as directed Daily for 30 days.     meclizine 25 MG tablet  Commonly known as: ANTIVERT  Take 1 tablet by mouth 3 (Three) Times a Day As Needed for Dizziness.     omeprazole 20 MG capsule  Commonly known as: priLOSEC  Take 1 capsule by mouth Daily for 30 days.     ondansetron ODT 4 MG disintegrating tablet  Commonly known as: ZOFRAN-ODT  Place 1 tablet under the tongue 4 (Four) Times a Day As Needed for Nausea or Vomiting.     propranolol 20 MG tablet  Commonly known as: INDERAL  Take 1 tablet by mouth 2 (Two) Times a Day for 30 days.     spironolactone 50 MG tablet  Commonly known as: ALDACTONE  Take 2 tablets by  mouth Daily.        Changed    * amLODIPine 10 MG tablet  Commonly known as: NORVASC  What changed: Another medication with the same name was added. Make sure you understand how and when to take each.     * amLODIPine 10 MG tablet  Commonly known as: NORVASC  Take 1 tablet by mouth Daily.  What changed: You were already taking a medication with the same name, and this prescription was added. Make sure you understand how and when to take each.     * Percocet  MG per tablet  Generic drug: oxyCODONE-acetaminophen  What changed: Another medication with the same name was added. Make sure you understand how and when to take each.     * oxyCODONE-acetaminophen  MG per tablet  Commonly known as: PERCOCET  Take 1 tablet by mouth Every 6 (Six) Hours As Needed for Moderate Pain  for up to 3 days.  What changed: You were already taking a medication with the same name, and this prescription was added. Make sure you understand how and when to take each.         * This list has 4 medication(s) that are the same as other medications prescribed for you. Read the directions carefully, and ask your doctor or other care provider to review them with you.            Stop    HYDROcodone-acetaminophen 5-325 MG per tablet  Commonly known as: NORCO           Where to Get Your Medications      These medications were sent to [address] [facility] AT [facility] [address] & [place] - [phone]  - [phone] [address] [address]    Phone: [phone]   · albuterol sulfate  (90 Base) MCG/ACT inhaler  · amLODIPine 10 MG tablet  · baclofen 10 MG tablet  · busPIRone 5 MG tablet  · cyclobenzaprine 10 MG tablet  · furosemide 40 MG tablet  · glimepiride 1 MG tablet  · Insulin Glargine 100 UNIT/ML injection pen  · meclizine 25 MG tablet  · omeprazole 20 MG capsule  · ondansetron ODT 4 MG disintegrating tablet  · oxyCODONE-acetaminophen  MG per  tablet  · propranolol 20 MG tablet  · spironolactone 50 MG tablet          Sandy Allen, JOY  10/16/21 3959

## 2021-10-19 ENCOUNTER — TELEPHONE (OUTPATIENT)
Dept: INTERNAL MEDICINE | Facility: CLINIC | Age: 56
End: 2021-10-19

## 2021-10-19 NOTE — SIGNIFICANT NOTE
10/19/21 1228   Plan   Plan Comments SW contacted [facility] to follow up for patient to get scheduled sooner with Dr. Franco. Patient was unable to get sooner appointment with Dr. Franco but did schedule him to see Mela Prater on 10/26/2021 at 10:30am. SW attempted to contact patient to inform him of appointment but did not answer. Left voicemail with call back number.   Final Discharge Disposition Code 01 - home or self-care

## 2021-10-22 ENCOUNTER — HOSPITAL ENCOUNTER (EMERGENCY)
Facility: HOSPITAL | Age: 56
Discharge: HOME | End: 2021-10-22
Attending: EMERGENCY MEDICINE | Admitting: EMERGENCY MEDICINE

## 2021-10-22 VITALS
TEMPERATURE: 98.3 F | HEIGHT: 68 IN | DIASTOLIC BLOOD PRESSURE: 88 MMHG | RESPIRATION RATE: 22 BRPM | WEIGHT: 296.96 LBS | BODY MASS INDEX: 45.01 KG/M2 | SYSTOLIC BLOOD PRESSURE: 188 MMHG | OXYGEN SATURATION: 100 % | HEART RATE: 104 BPM

## 2021-10-22 DIAGNOSIS — Z76.0 ENCOUNTER FOR MEDICATION REFILL: ICD-10-CM

## 2021-10-22 DIAGNOSIS — G89.29 CHRONIC MIDLINE LOW BACK PAIN WITH LEFT-SIDED SCIATICA: Primary | ICD-10-CM

## 2021-10-22 DIAGNOSIS — M54.42 CHRONIC MIDLINE LOW BACK PAIN WITH LEFT-SIDED SCIATICA: Primary | ICD-10-CM

## 2021-10-22 PROCEDURE — 99283 EMERGENCY DEPT VISIT LOW MDM: CPT

## 2021-10-22 RX ORDER — OXYCODONE AND ACETAMINOPHEN 10; 325 MG/1; MG/1
1 TABLET ORAL EVERY 6 HOURS PRN
Qty: 12 TABLET | Refills: 0 | Status: SHIPPED | OUTPATIENT
Start: 2021-10-22 | End: 2021-11-02 | Stop reason: SDUPTHER

## 2021-10-22 RX ORDER — OXYCODONE AND ACETAMINOPHEN 10; 325 MG/1; MG/1
1 TABLET ORAL ONCE
Status: COMPLETED | OUTPATIENT
Start: 2021-10-22 | End: 2021-10-22

## 2021-10-22 RX ADMIN — OXYCODONE HYDROCHLORIDE AND ACETAMINOPHEN 1 TABLET: 10; 325 TABLET ORAL at 13:44

## 2021-10-22 NOTE — ED PROVIDER NOTES
Subjective   History of Present Illness  The patient is a 56-year-old male who presents to the ER for pain medication refill.  Patient says having a low back pain that goes down his left leg.  Patient this is a chronic condition for him.  Patient has been seen here in the ED for this as well.  Patient states that he does not have a PCP appointment until December.  He does state however that when he was here on his last visit that social work was attempting to get him to be seen sooner.  Patient denies any new complaints.  He denies any bowel or bladder incontinence or retention.  He denies any recent or new injury.  Patient states that he has been taking Percocet 10/325.  He states that 3-day supply made he was able to stretch it out for a week.    Review of Systems   Constitutional: Negative.    HENT: Negative.    Eyes: Negative.    Respiratory: Negative.    Cardiovascular: Negative.    Gastrointestinal: Negative.    Endocrine: Negative.    Genitourinary: Negative.    Musculoskeletal: Positive for back pain.   Skin: Negative.    Allergic/Immunologic: Negative.    Neurological: Negative.    Hematological: Negative.    Psychiatric/Behavioral: Negative.    All other systems reviewed and are negative.      Past Medical History:   Diagnosis Date   • Asthma    • Cirrhosis (HCC)    • Colon polyps    • Diabetes mellitus (HCC)    • Hypertension    • Liver disease    • Reflux esophagitis    • Renal disorder    • Sleep apnea        No Known Allergies    Past Surgical History:   Procedure Laterality Date   • COLONOSCOPY  2018, 2020   • ENDOSCOPY  2019   • LEG SURGERY     • PELVIC FRACTURE SURGERY         Family History   Problem Relation Age of Onset   • Stomach cancer Sister        Social History     Socioeconomic History   • Marital status:    Tobacco Use   • Smoking status: Never Smoker   Substance and Sexual Activity   • Alcohol use: Never   • Drug use: Never           Objective   Physical Exam  Vital signs were  reviewed under triage note.  General appearance - Patient appears well-developed and well-nourished.  Patient is in no acute distress.  Head - Normocephalic, atraumatic.  Pupils - Equal, round, reactive to light.  Extraocular muscles are intact.  Conjunctive is clear.  Nasal - Normal inspection.  No evidence of trauma or epistaxis.  Tympanic membranes - Gray, intact without erythema or retractions.  Oral mucosa - Pink and moist without lesions or erythema.  Uvula is midline.  Chest wall - Atraumatic.  Chest wall is nontender.  There is no vesicular rashes noted.  Neck - Supple.  Trachea was midline.  There is no palpable lymphadenopathy or thyromegaly.  There are no meningeal signs  Lungs - Clear to auscultation and percussion bilaterally.  Heart - Regular rate and rhythm without any murmurs, clicks, or gallops.  Abdomen - Soft.  Bowel sounds are present.  There is no palpable tenderness.  There is no rebound, guarding, or rigidity.  There are no palpable masses.  There are no pulsatile masses.  Back - Spine is straight and midline.  There is no CVA tenderness.  Diffuse lumbar tenderness on palpation.  Extremities - Intact x4 with full range of motion.  There is no palpable edema.  Pulses are intact x4 and equal.  Neurologic - Patient is awake, alert, and oriented x3.  Cranial nerves II through XII are grossly intact.  Motor and sensory functions grossly intact.  Cerebellar function was normal.  Negative straight leg raise test.  Integument - There are no rashes.  There are no petechia or purpura lesions noted.  There are no vesicular lesions noted.    Procedures           ED Course         The patient was seen and evaluated in the ED by me.  The above history and physical examination was performed.  Social work was consulted.  We reviewed the last ED visit.  After his discharge social work was able to arrange for an appointment with Dr. Franco on 26 October.  It was noted that social work was unable to get in touch  with him to notify him of this appointment.  Subsequently, we have verified that appointment is stillstands and then talk to the patient.  The patient's agreeable to follow-up on the 26 with Dr. Franco.  Thus since he is going to follow-up on the 26th I will give him 1 more prescription here with the understanding that the ER is not a place for chronic pain medication refills.  If he runs out of medicines it is his responsibility get a refill and not come back to the ER.                                  MDM    Final diagnoses:   Chronic midline low back pain with left-sided sciatica   Encounter for medication refill       ED Disposition  ED Disposition     ED Disposition Condition Comment    Discharge Stable           Jonh Franco Jr., MD  [address] [facility]  [address]  [place] [id number] [phone]      On October 26 as scheduled.         Medication List      Changed    * Percocet  MG per tablet  Generic drug: oxyCODONE-acetaminophen  What changed: Another medication with the same name was added. Make sure you understand how and when to take each.     * oxyCODONE-acetaminophen  MG per tablet  Commonly known as: PERCOCET  Take 1 tablet by mouth Every 6 (Six) Hours As Needed for Moderate Pain .  What changed: You were already taking a medication with the same name, and this prescription was added. Make sure you understand how and when to take each.         * This list has 2 medication(s) that are the same as other medications prescribed for you. Read the directions carefully, and ask your doctor or other care provider to review them with you.               Where to Get Your Medications      These medications were sent to [facility] [address] [place], [place] [address], [place] - [address] [facility] AT [facility] [address] & [place] - [phone]  - [phone]   [address] [facility] [address]    Phone: [phone]   · oxyCODONE-acetaminophen  MG per tablet          Neymar Tompkins,  DO  10/22/21 8804

## 2021-10-22 NOTE — DISCHARGE INSTRUCTIONS
Follow-up on October 26 as scheduled for PCP evaluation.  Take your pain medication sparingly.  Any additional refills will need to come from your primary care provider.  Return to the ER for any new or other symptoms that may arise.

## 2021-10-25 ENCOUNTER — TELEPHONE (OUTPATIENT)
Dept: INTERNAL MEDICINE | Facility: CLINIC | Age: 56
End: 2021-10-25

## 2021-10-27 ENCOUNTER — HOSPITAL ENCOUNTER (EMERGENCY)
Facility: HOSPITAL | Age: 56
Discharge: HOME | End: 2021-10-27
Attending: EMERGENCY MEDICINE | Admitting: EMERGENCY MEDICINE

## 2021-10-27 VITALS
SYSTOLIC BLOOD PRESSURE: 176 MMHG | WEIGHT: 301.81 LBS | HEART RATE: 99 BPM | DIASTOLIC BLOOD PRESSURE: 83 MMHG | RESPIRATION RATE: 20 BRPM | BODY MASS INDEX: 45.74 KG/M2 | TEMPERATURE: 98.8 F | OXYGEN SATURATION: 96 % | HEIGHT: 68 IN

## 2021-10-27 DIAGNOSIS — M54.42 CHRONIC MIDLINE LOW BACK PAIN WITH LEFT-SIDED SCIATICA: Primary | ICD-10-CM

## 2021-10-27 DIAGNOSIS — G89.29 CHRONIC MIDLINE LOW BACK PAIN WITH LEFT-SIDED SCIATICA: Primary | ICD-10-CM

## 2021-10-27 PROCEDURE — 99283 EMERGENCY DEPT VISIT LOW MDM: CPT

## 2021-10-27 PROCEDURE — 25010000002 DEXAMETHASONE PER 1 MG

## 2021-10-27 PROCEDURE — 25010000002 KETOROLAC TROMETHAMINE PER 15 MG

## 2021-10-27 PROCEDURE — 96372 THER/PROPH/DIAG INJ SC/IM: CPT

## 2021-10-27 RX ORDER — OXYCODONE AND ACETAMINOPHEN 10; 325 MG/1; MG/1
1 TABLET ORAL ONCE
Status: COMPLETED | OUTPATIENT
Start: 2021-10-27 | End: 2021-10-27

## 2021-10-27 RX ORDER — KETOROLAC TROMETHAMINE 30 MG/ML
60 INJECTION, SOLUTION INTRAMUSCULAR; INTRAVENOUS ONCE
Status: COMPLETED | OUTPATIENT
Start: 2021-10-27 | End: 2021-10-27

## 2021-10-27 RX ORDER — KETOROLAC TROMETHAMINE 10 MG/1
10 TABLET, FILM COATED ORAL EVERY 6 HOURS PRN
Qty: 20 TABLET | Refills: 0 | Status: SHIPPED | OUTPATIENT
Start: 2021-10-27 | End: 2021-11-02

## 2021-10-27 RX ORDER — DEXAMETHASONE SODIUM PHOSPHATE 10 MG/ML
10 INJECTION INTRAMUSCULAR; INTRAVENOUS ONCE
Status: COMPLETED | OUTPATIENT
Start: 2021-10-27 | End: 2021-10-27

## 2021-10-27 RX ADMIN — DEXAMETHASONE SODIUM PHOSPHATE 10 MG: 10 INJECTION INTRAMUSCULAR; INTRAVENOUS at 15:34

## 2021-10-27 RX ADMIN — KETOROLAC TROMETHAMINE 60 MG: 60 INJECTION, SOLUTION INTRAMUSCULAR at 15:34

## 2021-10-27 RX ADMIN — OXYCODONE HYDROCHLORIDE AND ACETAMINOPHEN 1 TABLET: 10; 325 TABLET ORAL at 16:28

## 2021-11-02 ENCOUNTER — OFFICE VISIT (OUTPATIENT)
Dept: INTERNAL MEDICINE | Facility: CLINIC | Age: 56
End: 2021-11-02

## 2021-11-02 VITALS
HEART RATE: 105 BPM | WEIGHT: 312.4 LBS | DIASTOLIC BLOOD PRESSURE: 95 MMHG | SYSTOLIC BLOOD PRESSURE: 189 MMHG | TEMPERATURE: 97.4 F | OXYGEN SATURATION: 99 % | HEIGHT: 68 IN | BODY MASS INDEX: 47.35 KG/M2

## 2021-11-02 DIAGNOSIS — G89.29 CHRONIC MIDLINE LOW BACK PAIN WITH LEFT-SIDED SCIATICA: ICD-10-CM

## 2021-11-02 DIAGNOSIS — K21.9 GASTROESOPHAGEAL REFLUX DISEASE, UNSPECIFIED WHETHER ESOPHAGITIS PRESENT: ICD-10-CM

## 2021-11-02 DIAGNOSIS — Z13.6 SCREENING FOR CARDIOVASCULAR CONDITION: ICD-10-CM

## 2021-11-02 DIAGNOSIS — F32.A DEPRESSION, UNSPECIFIED DEPRESSION TYPE: ICD-10-CM

## 2021-11-02 DIAGNOSIS — G89.21 CHRONIC PAIN DUE TO TRAUMA: ICD-10-CM

## 2021-11-02 DIAGNOSIS — R60.0 LOWER EXTREMITY EDEMA: ICD-10-CM

## 2021-11-02 DIAGNOSIS — E11.43 TYPE 2 DIABETES MELLITUS WITH DIABETIC AUTONOMIC NEUROPATHY, WITH LONG-TERM CURRENT USE OF INSULIN (HCC): ICD-10-CM

## 2021-11-02 DIAGNOSIS — R79.89 INCREASED AMMONIA LEVEL: ICD-10-CM

## 2021-11-02 DIAGNOSIS — J45.909 ASTHMA, UNSPECIFIED ASTHMA SEVERITY, UNSPECIFIED WHETHER COMPLICATED, UNSPECIFIED WHETHER PERSISTENT: ICD-10-CM

## 2021-11-02 DIAGNOSIS — Z79.4 TYPE 2 DIABETES MELLITUS WITH DIABETIC AUTONOMIC NEUROPATHY, WITH LONG-TERM CURRENT USE OF INSULIN (HCC): ICD-10-CM

## 2021-11-02 DIAGNOSIS — I10 HYPERTENSION, UNSPECIFIED TYPE: ICD-10-CM

## 2021-11-02 DIAGNOSIS — G47.33 OSA (OBSTRUCTIVE SLEEP APNEA): ICD-10-CM

## 2021-11-02 DIAGNOSIS — K76.0 NONALCOHOLIC FATTY LIVER DISEASE: ICD-10-CM

## 2021-11-02 DIAGNOSIS — R11.0 NAUSEA: ICD-10-CM

## 2021-11-02 DIAGNOSIS — Z76.89 ENCOUNTER TO ESTABLISH CARE: Primary | ICD-10-CM

## 2021-11-02 DIAGNOSIS — M54.42 CHRONIC MIDLINE LOW BACK PAIN WITH LEFT-SIDED SCIATICA: ICD-10-CM

## 2021-11-02 DIAGNOSIS — M62.838 MUSCLE SPASMS OF NECK: ICD-10-CM

## 2021-11-02 DIAGNOSIS — D61.818 PANCYTOPENIA (HCC): ICD-10-CM

## 2021-11-02 DIAGNOSIS — F41.9 ANXIETY: ICD-10-CM

## 2021-11-02 DIAGNOSIS — I50.23 ACUTE ON CHRONIC SYSTOLIC (CONGESTIVE) HEART FAILURE (HCC): ICD-10-CM

## 2021-11-02 PROBLEM — E78.5 HYPERLIPIDEMIA: Status: ACTIVE | Noted: 2021-11-02

## 2021-11-02 PROBLEM — I82.409 DVT (DEEP VENOUS THROMBOSIS): Status: ACTIVE | Noted: 2021-11-02

## 2021-11-02 PROBLEM — K63.5 COLON POLYPS: Status: ACTIVE | Noted: 2021-11-02

## 2021-11-02 PROBLEM — Z86.718 PERSONAL HISTORY OF THROMBOEMBOLIC DISEASE: Status: ACTIVE | Noted: 2018-05-21

## 2021-11-02 PROBLEM — G47.30 SLEEP APNEA: Status: ACTIVE | Noted: 2021-11-02

## 2021-11-02 PROBLEM — I50.20 SYSTOLIC CONGESTIVE HEART FAILURE: Status: ACTIVE | Noted: 2019-11-08

## 2021-11-02 PROBLEM — K74.60 CIRRHOSIS: Status: ACTIVE | Noted: 2021-11-02

## 2021-11-02 LAB
ALBUMIN SERPL-MCNC: 2.5 G/DL (ref 3.5–5.2)
ALBUMIN/GLOB SERPL: 0.9 G/DL
ALP SERPL-CCNC: 112 U/L (ref 39–117)
ALT SERPL W P-5'-P-CCNC: 20 U/L (ref 1–41)
ANION GAP SERPL CALCULATED.3IONS-SCNC: 5.3 MMOL/L (ref 5–15)
AST SERPL-CCNC: 49 U/L (ref 1–40)
BASOPHILS # BLD AUTO: 0.01 10*3/MM3 (ref 0–0.2)
BASOPHILS NFR BLD AUTO: 0.3 % (ref 0–1.5)
BILIRUB SERPL-MCNC: 1.6 MG/DL (ref 0–1.2)
BUN SERPL-MCNC: 14 MG/DL (ref 6–20)
BUN/CREAT SERPL: 17.5 (ref 7–25)
CALCIUM SPEC-SCNC: 8.1 MG/DL (ref 8.6–10.5)
CHLORIDE SERPL-SCNC: 104 MMOL/L (ref 98–107)
CHOLEST SERPL-MCNC: 101 MG/DL (ref 0–200)
CO2 SERPL-SCNC: 26.7 MMOL/L (ref 22–29)
CREAT SERPL-MCNC: 0.8 MG/DL (ref 0.76–1.27)
DEPRECATED RDW RBC AUTO: 46.1 FL (ref 37–54)
EOSINOPHIL # BLD AUTO: 0.07 10*3/MM3 (ref 0–0.4)
EOSINOPHIL NFR BLD AUTO: 2.4 % (ref 0.3–6.2)
ERYTHROCYTE [DISTWIDTH] IN BLOOD BY AUTOMATED COUNT: 14.1 % (ref 12.3–15.4)
GFR SERPL CREATININE-BSD FRML MDRD: 100 ML/MIN/1.73
GLOBULIN UR ELPH-MCNC: 2.8 GM/DL
GLUCOSE SERPL-MCNC: 280 MG/DL (ref 65–99)
HBA1C MFR BLD: 7.1 % (ref 4.8–5.6)
HCT VFR BLD AUTO: 28.7 % (ref 37.5–51)
HDLC SERPL-MCNC: 40 MG/DL (ref 40–60)
HGB BLD-MCNC: 9.2 G/DL (ref 13–17.7)
IMM GRANULOCYTES # BLD AUTO: 0.01 10*3/MM3 (ref 0–0.05)
IMM GRANULOCYTES NFR BLD AUTO: 0.3 % (ref 0–0.5)
LDLC SERPL CALC-MCNC: 48 MG/DL (ref 0–100)
LDLC/HDLC SERPL: 1.25 {RATIO}
LYMPHOCYTES # BLD AUTO: 0.49 10*3/MM3 (ref 0.7–3.1)
LYMPHOCYTES NFR BLD AUTO: 17 % (ref 19.6–45.3)
MCH RBC QN AUTO: 29 PG (ref 26.6–33)
MCHC RBC AUTO-ENTMCNC: 32.1 G/DL (ref 31.5–35.7)
MCV RBC AUTO: 90.5 FL (ref 79–97)
MONOCYTES # BLD AUTO: 0.3 10*3/MM3 (ref 0.1–0.9)
MONOCYTES NFR BLD AUTO: 10.4 % (ref 5–12)
NEUTROPHILS NFR BLD AUTO: 2 10*3/MM3 (ref 1.7–7)
NEUTROPHILS NFR BLD AUTO: 69.6 % (ref 42.7–76)
NRBC BLD AUTO-RTO: 0 /100 WBC (ref 0–0.2)
PLATELET # BLD AUTO: 71 10*3/MM3 (ref 140–450)
PMV BLD AUTO: 11.2 FL (ref 6–12)
POTASSIUM SERPL-SCNC: 4.1 MMOL/L (ref 3.5–5.2)
PROT SERPL-MCNC: 5.3 G/DL (ref 6–8.5)
RBC # BLD AUTO: 3.17 10*6/MM3 (ref 4.14–5.8)
SODIUM SERPL-SCNC: 136 MMOL/L (ref 136–145)
TRIGL SERPL-MCNC: 56 MG/DL (ref 0–150)
TSH SERPL DL<=0.05 MIU/L-ACNC: 3.05 UIU/ML (ref 0.27–4.2)
VLDLC SERPL-MCNC: 13 MG/DL (ref 5–40)
WBC # BLD AUTO: 2.88 10*3/MM3 (ref 3.4–10.8)

## 2021-11-02 PROCEDURE — 80053 COMPREHEN METABOLIC PANEL: CPT | Performed by: NURSE PRACTITIONER

## 2021-11-02 PROCEDURE — 84443 ASSAY THYROID STIM HORMONE: CPT | Performed by: NURSE PRACTITIONER

## 2021-11-02 PROCEDURE — 83036 HEMOGLOBIN GLYCOSYLATED A1C: CPT | Performed by: NURSE PRACTITIONER

## 2021-11-02 PROCEDURE — 85025 COMPLETE CBC W/AUTO DIFF WBC: CPT | Performed by: NURSE PRACTITIONER

## 2021-11-02 PROCEDURE — 80061 LIPID PANEL: CPT | Performed by: NURSE PRACTITIONER

## 2021-11-02 PROCEDURE — 99214 OFFICE O/P EST MOD 30 MIN: CPT | Performed by: NURSE PRACTITIONER

## 2021-11-02 RX ORDER — CYCLOBENZAPRINE HCL 10 MG
10 TABLET ORAL 3 TIMES DAILY PRN
Qty: 12 TABLET | Refills: 0 | Status: SHIPPED | OUTPATIENT
Start: 2021-11-02 | End: 2021-12-11 | Stop reason: HOSPADM

## 2021-11-02 RX ORDER — SPIRONOLACTONE 50 MG/1
100 TABLET, FILM COATED ORAL DAILY
Qty: 30 TABLET | Refills: 0 | Status: SHIPPED | OUTPATIENT
Start: 2021-11-02 | End: 2021-11-19

## 2021-11-02 RX ORDER — OXYCODONE AND ACETAMINOPHEN 10; 325 MG/1; MG/1
1 TABLET ORAL EVERY 8 HOURS PRN
Qty: 50 TABLET | Refills: 0 | Status: SHIPPED | OUTPATIENT
Start: 2021-11-02 | End: 2021-12-03 | Stop reason: ALTCHOICE

## 2021-11-02 RX ORDER — ALBUTEROL SULFATE 90 UG/1
2 AEROSOL, METERED RESPIRATORY (INHALATION) EVERY 4 HOURS PRN
Qty: 90 G | Refills: 0 | Status: SHIPPED | OUTPATIENT
Start: 2021-11-02 | End: 2022-01-30 | Stop reason: HOSPADM

## 2021-11-02 RX ORDER — BACLOFEN 10 MG/1
10 TABLET ORAL 3 TIMES DAILY
Qty: 20 TABLET | Refills: 0 | Status: SHIPPED | OUTPATIENT
Start: 2021-11-02 | End: 2021-12-03 | Stop reason: ALTCHOICE

## 2021-11-02 RX ORDER — AMLODIPINE BESYLATE 10 MG/1
10 TABLET ORAL DAILY
Qty: 30 TABLET | Refills: 0 | Status: SHIPPED | OUTPATIENT
Start: 2021-11-02 | End: 2022-02-10 | Stop reason: SDUPTHER

## 2021-11-02 RX ORDER — BUSPIRONE HYDROCHLORIDE 5 MG/1
5 TABLET ORAL 3 TIMES DAILY
Qty: 30 TABLET | Refills: 0 | Status: SHIPPED | OUTPATIENT
Start: 2021-11-02 | End: 2022-04-12

## 2021-11-02 RX ORDER — FUROSEMIDE 40 MG/1
40 TABLET ORAL 3 TIMES DAILY
Qty: 90 TABLET | Refills: 0 | Status: SHIPPED | OUTPATIENT
Start: 2021-11-02 | End: 2021-11-10 | Stop reason: SDUPTHER

## 2021-11-02 RX ORDER — GLIMEPIRIDE 1 MG/1
1 TABLET ORAL
Qty: 30 TABLET | Refills: 0 | Status: SHIPPED | OUTPATIENT
Start: 2021-11-02 | End: 2022-02-10

## 2021-11-02 RX ORDER — PROPRANOLOL HYDROCHLORIDE 20 MG/1
40 TABLET ORAL 2 TIMES DAILY
Qty: 120 TABLET | Refills: 0 | Status: SHIPPED | OUTPATIENT
Start: 2021-11-02 | End: 2021-11-10 | Stop reason: SDUPTHER

## 2021-11-02 RX ORDER — ONDANSETRON 4 MG/1
4 TABLET, ORALLY DISINTEGRATING ORAL 4 TIMES DAILY PRN
Qty: 30 TABLET | Refills: 0 | Status: SHIPPED | OUTPATIENT
Start: 2021-11-02 | End: 2021-11-24 | Stop reason: SDUPTHER

## 2021-11-02 RX ORDER — OMEPRAZOLE 20 MG/1
20 CAPSULE, DELAYED RELEASE ORAL DAILY
Qty: 30 CAPSULE | Refills: 0 | Status: SHIPPED | OUTPATIENT
Start: 2021-11-02 | End: 2021-12-14
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
## 2021-11-10 ENCOUNTER — OFFICE VISIT (OUTPATIENT)
Dept: INTERNAL MEDICINE | Facility: CLINIC | Age: 56
End: 2021-11-10

## 2021-11-10 VITALS
WEIGHT: 311 LBS | DIASTOLIC BLOOD PRESSURE: 100 MMHG | OXYGEN SATURATION: 96 % | BODY MASS INDEX: 47.13 KG/M2 | HEART RATE: 112 BPM | HEIGHT: 68 IN | TEMPERATURE: 98.3 F | SYSTOLIC BLOOD PRESSURE: 178 MMHG

## 2021-11-10 DIAGNOSIS — Z79.4 TYPE 2 DIABETES MELLITUS WITH DIABETIC AUTONOMIC NEUROPATHY, WITH LONG-TERM CURRENT USE OF INSULIN (HCC): ICD-10-CM

## 2021-11-10 DIAGNOSIS — G89.29 CHRONIC MIDLINE LOW BACK PAIN WITH LEFT-SIDED SCIATICA: ICD-10-CM

## 2021-11-10 DIAGNOSIS — G89.21 CHRONIC PAIN DUE TO TRAUMA: ICD-10-CM

## 2021-11-10 DIAGNOSIS — E11.43 TYPE 2 DIABETES MELLITUS WITH DIABETIC AUTONOMIC NEUROPATHY, WITH LONG-TERM CURRENT USE OF INSULIN (HCC): ICD-10-CM

## 2021-11-10 DIAGNOSIS — Z74.2 HOME HELP NEEDED: ICD-10-CM

## 2021-11-10 DIAGNOSIS — Z79.899 LONG TERM USE OF DRUG: Primary | ICD-10-CM

## 2021-11-10 DIAGNOSIS — I10 HYPERTENSION, UNSPECIFIED TYPE: ICD-10-CM

## 2021-11-10 DIAGNOSIS — R60.0 LOWER EXTREMITY EDEMA: ICD-10-CM

## 2021-11-10 DIAGNOSIS — Z79.899 LONG-TERM USE OF HIGH-RISK MEDICATION: ICD-10-CM

## 2021-11-10 DIAGNOSIS — M54.42 CHRONIC MIDLINE LOW BACK PAIN WITH LEFT-SIDED SCIATICA: ICD-10-CM

## 2021-11-10 PROBLEM — Z79.01 CHRONIC ANTICOAGULATION: Status: RESOLVED | Noted: 2018-05-21 | Resolved: 2021-11-10

## 2021-11-10 PROBLEM — Z79.01 CHRONIC ANTICOAGULATION: Status: ACTIVE | Noted: 2018-05-21

## 2021-11-10 PROBLEM — E11.9 DIABETES MELLITUS WITHOUT COMPLICATION: Status: ACTIVE | Noted: 2018-05-21

## 2021-11-10 PROBLEM — N30.20 CHRONIC CYSTITIS: Status: ACTIVE | Noted: 2021-11-10

## 2021-11-10 PROBLEM — Z86.718 PERSONAL HISTORY OF THROMBOEMBOLIC DISEASE: Status: RESOLVED | Noted: 2018-05-21 | Resolved: 2021-11-10

## 2021-11-10 PROBLEM — I82.409 DVT (DEEP VENOUS THROMBOSIS) (HCC): Status: RESOLVED | Noted: 2021-11-02 | Resolved: 2021-11-10

## 2021-11-10 LAB
ALBUMIN UR-MCNC: <1.2 MG/DL
AMPHET+METHAMPHET UR QL: NEGATIVE
AMPHETAMINE INTERNAL CONTROL: ABNORMAL
AMPHETAMINES UR QL: NEGATIVE
BARBITURATE INTERNAL CONTROL: ABNORMAL
BARBITURATES UR QL SCN: NEGATIVE
BENZODIAZ UR QL SCN: NEGATIVE
BENZODIAZEPINE INTERNAL CONTROL: ABNORMAL
BUPRENORPHINE INTERNAL CONTROL: ABNORMAL
BUPRENORPHINE SERPL-MCNC: NEGATIVE NG/ML
CANNABINOIDS SERPL QL: NEGATIVE
COCAINE INTERNAL CONTROL: ABNORMAL
COCAINE UR QL: NEGATIVE
EXPIRATION DATE: ABNORMAL
Lab: ABNORMAL
MDMA (ECSTASY) INTERNAL CONTROL: ABNORMAL
MDMA UR QL SCN: NEGATIVE
METHADONE INTERNAL CONTROL: ABNORMAL
METHADONE UR QL SCN: NEGATIVE
METHAMPHETAMINE INTERNAL CONTROL: ABNORMAL
OPIATES INTERNAL CONTROL: ABNORMAL
OPIATES UR QL: NEGATIVE
OXYCODONE INTERNAL CONTROL: ABNORMAL
OXYCODONE UR QL SCN: POSITIVE
PCP UR QL SCN: NEGATIVE
PHENCYCLIDINE INTERNAL CONTROL: ABNORMAL
THC INTERNAL CONTROL: ABNORMAL

## 2021-11-10 PROCEDURE — 82043 UR ALBUMIN QUANTITATIVE: CPT | Performed by: NURSE PRACTITIONER

## 2021-11-10 PROCEDURE — 80305 DRUG TEST PRSMV DIR OPT OBS: CPT | Performed by: NURSE PRACTITIONER

## 2021-11-10 PROCEDURE — 99214 OFFICE O/P EST MOD 30 MIN: CPT | Performed by: NURSE PRACTITIONER

## 2021-11-10 RX ORDER — PROPRANOLOL HYDROCHLORIDE 20 MG/1
60 TABLET ORAL 2 TIMES DAILY
Qty: 180 TABLET | Refills: 0 | Status: SHIPPED | OUTPATIENT
Start: 2021-11-10 | End: 2021-12-14

## 2021-11-10 RX ORDER — FUROSEMIDE 40 MG/1
80 TABLET ORAL 3 TIMES DAILY
Qty: 180 TABLET | Refills: 0 | Status: SHIPPED | OUTPATIENT
Start: 2021-11-10 | End: 2021-12-11 | Stop reason: HOSPADM

## 2021-11-10 NOTE — PROGRESS NOTES
"Chief Complaint  knot on stomach and Foot Swelling    Subjective          Nic Nicolas presents to [facility]  History of Present Illness    Patient presents to the clinic today concerned that he will not have enough oxycodone to get him to his pain management appt. [facility] because patient had previously been discharged from one of their clinics for testing positive for marijuana   Patient states that he does not use marijuana, his brother does, but he does use CBD oil for his hands.   MA working on getting patient scheduled with a different clinic.     Has not had ammonia levels drawn. As discussed at previous appt. Patient states that he will go and do that today.     HTN:     Patient's BP elevated again today. Will increase BP medication.   Denies headaches, chest pain, shortness of breath.     Will complete urine microalbumin, UDS, controlled consent, diabetic foot exam today in clinic.       Objective   Vital Signs:   /100 (BP Location: Left arm, Patient Position: Sitting, Cuff Size: Large Adult)   Pulse 112   Temp 98.3 °F (36.8 °C) (Temporal)   Ht 172.7 cm (68\")   Wt (!) 141 kg (311 lb)   SpO2 96%   BMI 47.29 kg/m²     Wt Readings from Last 3 Encounters:   11/10/21 (!) 141 kg (311 lb)   11/02/21 (!) 142 kg (312 lb 6.4 oz)   10/27/21 (!) 137 kg (301 lb 13 oz)     BP Readings from Last 3 Encounters:   11/10/21 178/100   11/02/21 (!) 189/95   10/27/21 176/83     Physical Exam  Vitals and nursing note reviewed.   Constitutional:       General: He is not in acute distress.     Appearance: Normal appearance. He is obese. He is not ill-appearing.   HENT:      Nose: Nose normal.      Mouth/Throat:      Mouth: Mucous membranes are moist.   Eyes:      Extraocular Movements: Extraocular movements intact.      Conjunctiva/sclera: Conjunctivae normal.   Cardiovascular:      Rate and Rhythm: Normal rate.   Pulmonary:      " Effort: Pulmonary effort is normal.      Breath sounds: Normal breath sounds.   Skin:     General: Skin is warm.      Capillary Refill: Capillary refill takes less than 2 seconds.      Comments: Bilateral lower extremity edema/PVD (chronic)  Patient sees vascular.    Neurological:      General: No focal deficit present.      Mental Status: He is alert and oriented to person, place, and time. Mental status is at baseline.   Psychiatric:         Mood and Affect: Mood normal.      Comments: Patient seems to have low level of education            Result Review :   The following data was reviewed by: BESSIE Nunez on 11/10/2021:  Common labs    Common Labsle 8/13/21 8/13/21 8/26/21 8/26/21 11/2/21 11/2/21 11/2/21 11/2/21    1155 1155 2151 2151 1257 1257 1257 1257   Glucose 274 (A)   284 (A)   280 (A)    BUN 9   12   14    Creatinine 0.71 (A)   0.86   0.80    eGFR Non  Am 115   92   100    Sodium 135 (A)   137   136    Potassium 3.5   4.0   4.1    Chloride 103   103   104    Calcium 8.3 (A)   9.3   8.1 (A)    Albumin 3.00 (A)   2.70 (A)   2.50 (A)    Total Bilirubin 2.1 (A)   1.6 (A)   1.6 (A)    Alkaline Phosphatase 138 (A)   130 (A)   112    AST (SGOT) 41 (A)   32   49 (A)    ALT (SGPT) 20   17   20    WBC  4.44 4.25     2.88 (A)   Hemoglobin  10.1 (A) 9.7 (A)     9.2 (A)   Hematocrit  30.9 (A) 29.5 (A)     28.7 (A)   Platelets  96 (A) 80 (A)     71 (A)   Total Cholesterol      101     Triglycerides      56     HDL Cholesterol      40     LDL Cholesterol       48     Hemoglobin A1C     7.10 (A)      (A) Abnormal value              Procedures        Assessment and Plan    Diagnoses and all orders for this visit:    1. Long term use of drug (Primary)  -     POC Urine Drug Screen [facility]    2. Chronic midline low back pain with left-sided sciatica  Comments:  CCM working on pain management referral   Orders:  -     Ambulatory Referral to Pain Management    3. Chronic pain due to trauma  -      Ambulatory Referral to Pain Management    4. Lower extremity edema  -     furosemide (LASIX) 40 MG tablet; Take 2 tablets by mouth 3 (Three) Times a Day for 30 days.  Dispense: 180 tablet; Refill: 0    5. Hypertension, unspecified type  Comments:  increased BP meds dosage   Orders:  -     propranolol (INDERAL) 20 MG tablet; Take 3 tablets by mouth 2 (Two) Times a Day for 30 days.  Dispense: 180 tablet; Refill: 0    6. Home help needed  -     Cancel: Ambulatory Referral to Social Work  -     Ambulatory Referral to Social Care Services (Amb Case Mgmt)    7. Long-term use of high-risk medication  -     POC Urine Drug Screen [facility]    8. Type 2 diabetes mellitus with diabetic autonomic neuropathy, with long-term current use of insulin (HCC)  -     MicroAlbumin, Urine, Random - Urine, Clean Catch  -     Ambulatory Referral to Diabetic Education  -     Ambulatory Referral to [facility] - [place]        Follow Up   Return as scheduled in December.  Patient was given instructions and counseling regarding his condition or for health maintenance advice. Please see specific information pulled into the AVS if appropriate.

## 2021-11-11 ENCOUNTER — REFERRAL TRIAGE (OUTPATIENT)
Dept: CASE MANAGEMENT | Facility: OTHER | Age: 56
End: 2021-11-11

## 2021-11-11 ENCOUNTER — TELEPHONE (OUTPATIENT)
Dept: INTERNAL MEDICINE | Facility: CLINIC | Age: 56
End: 2021-11-11

## 2021-11-11 NOTE — TELEPHONE ENCOUNTER
ATTEMPTED TO CONTACT PT PER PROVIDER'S INSTRUCTIONS     BAD NUMBER     UNABLE TO LEAVE A VOICEMAIL WITH INSTRUCTIONS TO RETURN CALL TO OFFICE AT [phone]    OK FOR HUB TO ADVISE/READ   ----- Message from BESSIE Nunez sent at 11/11/2021 11:10 AM EST -----  Patient needs appt with gastroenterology.      His A1C is also elevated. I have placed referral for diabetic clinic for Giuliana Leonardo. He needs extensive education and help financially with meal planning and medication adherence    MicroAlbumin, Urine, Random - Urine, Clean Catch  BESSIE Nunez   11/11/2021 10:17 AM EST Back to Top         Normal

## 2021-11-11 NOTE — TELEPHONE ENCOUNTER
ATTEMPTED TO CONTACT PT PER PROVIDER'S INSTRUCTIONS     NO ANSWER     LEFT VOICEMAIL WITH INSTRUCTIONS TO RETURN CALL TO OFFICE AT [phone]    OK FOR HUB TO ADVISE/READ   MicroAlbumin, Urine, Random - Urine, Clean Catch  BESSIE Nunez   11/11/2021 10:17 AM EST Back to Top        Normal

## 2021-11-11 NOTE — TELEPHONE ENCOUNTER
----- Message from BESSIE Nunez sent at 11/11/2021 11:10 AM EST -----  Patient needs appt with gastroenterology.     His A1C is also elevated. I have placed referral for diabetic clinic for Giuliana Leonardo. He needs extensive education and help financially with meal planning and medication adherence.

## 2021-11-16 ENCOUNTER — HOSPITAL ENCOUNTER (EMERGENCY)
Facility: HOSPITAL | Age: 56
Discharge: HOME | End: 2021-11-16
Attending: EMERGENCY MEDICINE | Admitting: EMERGENCY MEDICINE

## 2021-11-16 ENCOUNTER — APPOINTMENT (OUTPATIENT)
Dept: CT IMAGING | Facility: HOSPITAL | Age: 56
End: 2021-11-16

## 2021-11-16 VITALS
SYSTOLIC BLOOD PRESSURE: 170 MMHG | WEIGHT: 315 LBS | RESPIRATION RATE: 20 BRPM | BODY MASS INDEX: 47.74 KG/M2 | HEIGHT: 68 IN | HEART RATE: 102 BPM | OXYGEN SATURATION: 100 % | TEMPERATURE: 98.6 F | DIASTOLIC BLOOD PRESSURE: 89 MMHG
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
## 2021-11-16 DIAGNOSIS — S16.1XXA CERVICAL STRAIN, ACUTE, INITIAL ENCOUNTER: Primary | ICD-10-CM

## 2021-11-16 DIAGNOSIS — S40.011A CONTUSION OF RIGHT SCAPULAR REGION, INITIAL ENCOUNTER: ICD-10-CM

## 2021-11-16 LAB
ALBUMIN SERPL-MCNC: 2.9 G/DL (ref 3.5–5.2)
ALBUMIN/GLOB SERPL: 0.8 G/DL
ALP SERPL-CCNC: 164 U/L (ref 39–117)
ALT SERPL W P-5'-P-CCNC: 18 U/L (ref 1–41)
ANION GAP SERPL CALCULATED.3IONS-SCNC: 9.1 MMOL/L (ref 5–15)
APTT PPP: 28.3 SECONDS (ref 22.2–34.2)
AST SERPL-CCNC: 51 U/L (ref 1–40)
BASOPHILS # BLD AUTO: 0.05 10*3/MM3 (ref 0–0.2)
BASOPHILS NFR BLD AUTO: 0.9 % (ref 0–1.5)
BILIRUB SERPL-MCNC: 2.4 MG/DL (ref 0–1.2)
BUN SERPL-MCNC: 8 MG/DL (ref 6–20)
BUN/CREAT SERPL: 11 (ref 7–25)
CALCIUM SPEC-SCNC: 8.7 MG/DL (ref 8.6–10.5)
CHLORIDE SERPL-SCNC: 102 MMOL/L (ref 98–107)
CO2 SERPL-SCNC: 23.9 MMOL/L (ref 22–29)
CREAT SERPL-MCNC: 0.73 MG/DL (ref 0.76–1.27)
DEPRECATED RDW RBC AUTO: 50.4 FL (ref 37–54)
EOSINOPHIL # BLD AUTO: 0.11 10*3/MM3 (ref 0–0.4)
EOSINOPHIL NFR BLD AUTO: 2 % (ref 0.3–6.2)
ERYTHROCYTE [DISTWIDTH] IN BLOOD BY AUTOMATED COUNT: 15.6 % (ref 12.3–15.4)
GFR SERPL CREATININE-BSD FRML MDRD: 112 ML/MIN/1.73
GLOBULIN UR ELPH-MCNC: 3.7 GM/DL
GLUCOSE SERPL-MCNC: 158 MG/DL (ref 65–99)
HCT VFR BLD AUTO: 34.3 % (ref 37.5–51)
HGB BLD-MCNC: 11.2 G/DL (ref 13–17.7)
HOLD SPECIMEN: NORMAL
HOLD SPECIMEN: NORMAL
IMM GRANULOCYTES # BLD AUTO: 0.03 10*3/MM3 (ref 0–0.05)
IMM GRANULOCYTES NFR BLD AUTO: 0.6 % (ref 0–0.5)
INR PPP: 1.56 (ref 2–3)
LYMPHOCYTES # BLD AUTO: 0.84 10*3/MM3 (ref 0.7–3.1)
LYMPHOCYTES NFR BLD AUTO: 15.5 % (ref 19.6–45.3)
MCH RBC QN AUTO: 28.9 PG (ref 26.6–33)
MCHC RBC AUTO-ENTMCNC: 32.7 G/DL (ref 31.5–35.7)
MCV RBC AUTO: 88.6 FL (ref 79–97)
MONOCYTES # BLD AUTO: 0.49 10*3/MM3 (ref 0.1–0.9)
MONOCYTES NFR BLD AUTO: 9 % (ref 5–12)
NEUTROPHILS NFR BLD AUTO: 3.9 10*3/MM3 (ref 1.7–7)
NEUTROPHILS NFR BLD AUTO: 72 % (ref 42.7–76)
NRBC BLD AUTO-RTO: 0 /100 WBC (ref 0–0.2)
PLATELET # BLD AUTO: 140 10*3/MM3 (ref 140–450)
PMV BLD AUTO: 10.7 FL (ref 6–12)
POTASSIUM SERPL-SCNC: 3.8 MMOL/L (ref 3.5–5.2)
PROT SERPL-MCNC: 6.6 G/DL (ref 6–8.5)
PROTHROMBIN TIME: 15.4 SECONDS (ref 9.4–12)
RBC # BLD AUTO: 3.87 10*6/MM3 (ref 4.14–5.8)
SODIUM SERPL-SCNC: 135 MMOL/L (ref 136–145)
TROPONIN T SERPL-MCNC: <0.01 NG/ML (ref 0–0.03)
WBC # BLD AUTO: 5.42 10*3/MM3 (ref 3.4–10.8)
WHOLE BLOOD HOLD SPECIMEN: NORMAL
WHOLE BLOOD HOLD SPECIMEN: NORMAL

## 2021-11-16 PROCEDURE — 25010000002 HYDROMORPHONE 1 MG/ML SOLUTION: Performed by: EMERGENCY MEDICINE

## 2021-11-16 PROCEDURE — 85610 PROTHROMBIN TIME: CPT | Performed by: EMERGENCY MEDICINE

## 2021-11-16 PROCEDURE — 71260 CT THORAX DX C+: CPT

## 2021-11-16 PROCEDURE — 99284 EMERGENCY DEPT VISIT MOD MDM: CPT

## 2021-11-16 PROCEDURE — 0 IOPAMIDOL PER 1 ML: Performed by: EMERGENCY MEDICINE

## 2021-11-16 PROCEDURE — 25010000002 ONDANSETRON PER 1 MG: Performed by: EMERGENCY MEDICINE

## 2021-11-16 PROCEDURE — 84484 ASSAY OF TROPONIN QUANT: CPT | Performed by: EMERGENCY MEDICINE

## 2021-11-16 PROCEDURE — 25010000002 ORPHENADRINE CITRATE PER 60 MG: Performed by: EMERGENCY MEDICINE

## 2021-11-16 PROCEDURE — 96375 TX/PRO/DX INJ NEW DRUG ADDON: CPT

## 2021-11-16 PROCEDURE — 85730 THROMBOPLASTIN TIME PARTIAL: CPT | Performed by: EMERGENCY MEDICINE

## 2021-11-16 PROCEDURE — 70450 CT HEAD/BRAIN W/O DYE: CPT

## 2021-11-16 PROCEDURE — 85025 COMPLETE CBC W/AUTO DIFF WBC: CPT | Performed by: EMERGENCY MEDICINE

## 2021-11-16 PROCEDURE — 36415 COLL VENOUS BLD VENIPUNCTURE: CPT

## 2021-11-16 PROCEDURE — 96374 THER/PROPH/DIAG INJ IV PUSH: CPT

## 2021-11-16 PROCEDURE — 80053 COMPREHEN METABOLIC PANEL: CPT | Performed by: EMERGENCY MEDICINE

## 2021-11-16 PROCEDURE — 72125 CT NECK SPINE W/O DYE: CPT

## 2021-11-16 RX ORDER — SODIUM CHLORIDE 0.9 % (FLUSH) 0.9 %
10 SYRINGE (ML) INJECTION AS NEEDED
Status: DISCONTINUED | OUTPATIENT
Start: 2021-11-16 | End: 2021-11-16 | Stop reason: HOSPADM

## 2021-11-16 RX ORDER — ONDANSETRON 2 MG/ML
4 INJECTION INTRAMUSCULAR; INTRAVENOUS ONCE
Status: COMPLETED | OUTPATIENT
Start: 2021-11-16 | End: 2021-11-16

## 2021-11-16 RX ORDER — ORPHENADRINE CITRATE 30 MG/ML
60 INJECTION INTRAMUSCULAR; INTRAVENOUS ONCE
Status: COMPLETED | OUTPATIENT
Start: 2021-11-16 | End: 2021-11-16

## 2021-11-16 RX ADMIN — IOPAMIDOL 100 ML: 755 INJECTION, SOLUTION INTRAVENOUS at 11:41

## 2021-11-16 RX ADMIN — HYDROMORPHONE HYDROCHLORIDE 1 MG: 1 INJECTION, SOLUTION INTRAMUSCULAR; INTRAVENOUS; SUBCUTANEOUS at 12:05

## 2021-11-16 RX ADMIN — ONDANSETRON 4 MG: 2 INJECTION INTRAMUSCULAR; INTRAVENOUS at 12:43

## 2021-11-16 RX ADMIN — ORPHENADRINE CITRATE 60 MG: 60 INJECTION INTRAMUSCULAR; INTRAVENOUS at 12:05

## 2021-11-16 NOTE — ED PROVIDER NOTES
Time: 2:50 PM EST  Arrived by: Private vehicle  Chief Complaint: Neck and upper back pain  History provided by: Patient  History is limited by: N/A     History of Present Illness:  Patient is a 56 y.o. year old male that presents to the emergency department with complaints of neck and upper back pain.  Patient is that he injured himself yesterday when he slipped and fell on steps.  Patient states that he struck his upper back area.  Patient is initially the pain was not too bad but the pain is progressively gotten worse through the night into today.  Patient states that his neck also feels stiff and painful with rotation.  He denies any numbness or tingling to his arms or feet.  He denies any loss of consciousness or headache.  Patient states the pain is severe and uncontrollable.    HPI    Patient Care Team  Primary Care Provider: Britney Echevarria APRN    Past Medical History:     No Known Allergies  Past Medical History:   Diagnosis Date   • Asthma    • Cirrhosis (HCC)    • Colon polyps    • Diabetes mellitus (HCC)    • Hypertension    • Liver disease    • Reflux esophagitis    • Renal disorder    • Sleep apnea      Past Surgical History:   Procedure Laterality Date   • COLONOSCOPY  2018, 2020   • ENDOSCOPY  2019   • FRACTURE SURGERY     • LEG SURGERY     • PELVIC FRACTURE SURGERY       Family History   Problem Relation Age of Onset   • Stomach cancer Sister        Home Medications:  Prior to Admission medications    Medication Sig Start Date End Date Taking? Authorizing Provider   albuterol sulfate  (90 Base) MCG/ACT inhaler Inhale 2 puffs Every 4 (Four) Hours As Needed for Wheezing. 11/2/21   Britney Echevarria APRN   amLODIPine (NORVASC) 10 MG tablet Take 1 tablet by mouth Daily. 11/2/21   Britney Echevarria APRN   baclofen (LIORESAL) 10 MG tablet Take 1 tablet by mouth 3 (Three) Times a Day. 11/2/21   Britney Echevarria APRN   Blood Glucose Monitoring Suppl kit See Admin Instructions.  8/11/21 8/12/22  Provider, MD Joi   busPIRone (BUSPAR) 5 MG tablet Take 1 tablet by mouth 3 (Three) Times a Day. 11/2/21   Britney Echevarria APRN   cyclobenzaprine (FLEXERIL) 10 MG tablet Take 1 tablet by mouth 3 (Three) Times a Day As Needed for Muscle Spasms. 11/2/21   Britney Echevarria APRN   furosemide (LASIX) 40 MG tablet Take 2 tablets by mouth 3 (Three) Times a Day for 30 days. 11/10/21 12/10/21  Britney Echevarria APRN   glimepiride (Amaryl) 1 MG tablet Take 1 tablet by mouth Every Morning Before Breakfast for 30 days. 11/2/21 12/2/21  Britney Echevarria APRN   glucose blood test strip 1 strip.    Provider, MD Joi   Insulin Glargine (LANTUS SOLOSTAR) 100 UNIT/ML injection pen Inject 100 Units under the skin into the appropriate area as directed Daily for 30 days. 11/2/21 12/2/21  Britney Echevarria APRN   omeprazole (priLOSEC) 20 MG capsule Take 1 capsule by mouth Daily for 30 days. 11/2/21 12/2/21  Britney Echevarria APRN   ondansetron ODT (ZOFRAN-ODT) 4 MG disintegrating tablet Place 1 tablet under the tongue 4 (Four) Times a Day As Needed for Nausea or Vomiting. 11/2/21   Britney Echevarria APRN   oxyCODONE-acetaminophen (PERCOCET)  MG per tablet Take 1 tablet by mouth Every 8 (Eight) Hours As Needed for Moderate Pain . 11/2/21   Britney Echevarria APRN   Potassium 99 MG tablet Apply 99 mg to the mouth or throat 2 (Two) Times a Day. 11/2/21   Britney Echevarria APRN   propranolol (INDERAL) 20 MG tablet Take 3 tablets by mouth 2 (Two) Times a Day for 30 days. 11/10/21 12/10/21  Britney Echevarria APRN   sertraline (Zoloft) 50 MG tablet Take 1 tablet by mouth Daily. 11/2/21   Britney Echevarria APRN   spironolactone (ALDACTONE) 50 MG tablet Take 2 tablets by mouth Daily. 11/2/21   Britney Echevarria APRN        Social History:   Social History     Tobacco Use   • Smoking status: Never Smoker   • Smokeless tobacco: Never Used   Substance Use Topics  "  • Alcohol use: Never   • Drug use: Never       Review of Systems:  Review of Systems   Constitutional: Negative for chills and fever.   HENT: Negative for congestion, ear pain and sore throat.    Eyes: Negative for pain.   Respiratory: Negative for cough, chest tightness and shortness of breath.    Cardiovascular: Negative for chest pain.   Gastrointestinal: Negative for abdominal pain, diarrhea, nausea and vomiting.   Genitourinary: Negative for flank pain and hematuria.   Musculoskeletal: Positive for back pain. Negative for joint swelling.   Skin: Negative for pallor.   Neurological: Negative for seizures and headaches.   All other systems reviewed and are negative.       Physical Exam:  /96   Pulse 102   Temp 98.6 °F (37 °C) (Oral)   Resp 20   Ht 172.7 cm (68\")   Wt (!) 146 kg (322 lb 15.6 oz)   SpO2 100%   BMI 49.11 kg/m²     Physical Exam       Vital signs were reviewed under triage note.  General appearance - Patient appears well-developed and well-nourished.  Patient is in no acute distress.  Head - Normocephalic, atraumatic.  Pupils - Equal, round, reactive to light.  Extraocular muscles are intact.  Conjunctive is clear.  Nasal - Normal inspection.  No evidence of trauma or epistaxis.  Tympanic membranes - Gray, intact without erythema or retractions.  Oral mucosa - Pink and moist without lesions or erythema.  Uvula is midline.  Chest wall - Atraumatic.  Chest wall is nontender.  There is no vesicular rashes noted.  Neck - Supple.  There is limited rotation due to pain in the posterior cervical muscle regions bilaterally.  Trachea was midline.  There is no palpable lymphadenopathy or thyromegaly.  There are no meningeal signs  Lungs - Clear to auscultation and percussion bilaterally.  Heart - Regular rate and rhythm without any murmurs, clicks, or gallops.  Abdomen - Soft.  Bowel sounds are present.  There is no palpable tenderness.  There is no rebound, guarding, or rigidity.  There are no " palpable masses.  There are no pulsatile masses.  Back - Spine is straight and midline.  There is tenderness with palpation diffusely across the lower cervical spine and upper thoracic spine region.  There is no vertebral step-off.  There is also no focal palpable tenderness.  There is no CVA tenderness.  Extremities - Intact x4 with full range of motion.  There is no palpable edema.  Pulses are intact x4 and equal.  Neurologic - Patient is awake, alert, and oriented x3.  Cranial nerves II through XII are grossly intact.  Motor and sensory functions grossly intact.  Cerebellar function was normal.  Integument - There are no rashes.  There are no petechia or purpura lesions noted.  There are no vesicular lesions noted.      Medications in the Emergency Department:  Medications   sodium chloride 0.9 % flush 10 mL (has no administration in time range)   sodium chloride 0.9 % flush 10 mL (has no administration in time range)   iopamidol (ISOVUE-370) 76 % injection 100 mL (100 mL Intravenous Given 11/16/21 1141)   HYDROmorphone (DILAUDID) injection 1 mg (1 mg Intravenous Given 11/16/21 1205)   orphenadrine (NORFLEX) injection 60 mg (60 mg Intravenous Given 11/16/21 1205)   ondansetron (ZOFRAN) injection 4 mg (4 mg Intravenous Given 11/16/21 1243)        Labs  Lab Results (last 24 hours)     Procedure Component Value Units Date/Time    CBC & Differential [[id number]]  (Abnormal) Collected: 11/16/21 1110    Specimen: Blood from Arm, Left Updated: 11/16/21 1121    Narrative:      The following orders were created for panel order CBC & Differential.  Procedure                               Abnormality         Status                     ---------                               -----------         ------                     CBC Auto Differential[[id number]]        Abnormal            Final result                 Please view results for these tests on the individual orders.    Comprehensive Metabolic Panel [[id number]]  (Abnormal)  Collected: 11/16/21 1110    Specimen: Blood from Arm, Left Updated: 11/16/21 1141     Glucose 158 mg/dL      BUN 8 mg/dL      Creatinine 0.73 mg/dL      Sodium 135 mmol/L      Potassium 3.8 mmol/L      Chloride 102 mmol/L      CO2 23.9 mmol/L      Calcium 8.7 mg/dL      Total Protein 6.6 g/dL      Albumin 2.90 g/dL      ALT (SGPT) 18 U/L      AST (SGOT) 51 U/L      Alkaline Phosphatase 164 U/L      Total Bilirubin 2.4 mg/dL      eGFR Non African Amer 112 mL/min/1.73      Globulin 3.7 gm/dL      A/G Ratio 0.8 g/dL      BUN/Creatinine Ratio 11.0     Anion Gap 9.1 mmol/L     Narrative:      GFR Normal >60  Chronic Kidney Disease <60  Kidney Failure <15      Protime-INR [[id number]]  (Abnormal) Collected: 11/16/21 1110    Specimen: Blood from Arm, Left Updated: 11/16/21 1127     Protime 15.4 Seconds      INR 1.56    Narrative:      Suggested Therapeutic Ranges For Oral Anticoagulant Therapy:  Level of Therapy                      INR Target Range  Standard Dose                            2.0-3.0  High Dose                                2.5-3.5  Patients not receiving anticoagulant  Therapy Normal Range                     0.6-1.2    aPTT [[id number]]  (Normal) Collected: 11/16/21 1110    Specimen: Blood from Arm, Left Updated: 11/16/21 1127     PTT 28.3 seconds     Troponin [[id number]]  (Normal) Collected: 11/16/21 1110    Specimen: Blood from Arm, Left Updated: 11/16/21 1137     Troponin T <0.010 ng/mL     Narrative:      Troponin T Reference Range:  <= 0.03 ng/mL-   Negative for AMI  >0.03 ng/mL-     Abnormal for myocardial necrosis.  Clinicians would have to utilize clinical acumen, EKG, Troponin and serial changes to determine if it is an Acute Myocardial Infarction or myocardial injury due to an underlying chronic condition.       Results may be falsely decreased if patient taking Biotin.      CBC Auto Differential [[id number]]  (Abnormal) Collected: 11/16/21 1110    Specimen: Blood from Arm, Left Updated: 11/16/21  1121     WBC 5.42 10*3/mm3      RBC 3.87 10*6/mm3      Hemoglobin 11.2 g/dL      Hematocrit 34.3 %      MCV 88.6 fL      MCH 28.9 pg      MCHC 32.7 g/dL      RDW 15.6 %      RDW-SD 50.4 fl      MPV 10.7 fL      Platelets 140 10*3/mm3      Neutrophil % 72.0 %      Lymphocyte % 15.5 %      Monocyte % 9.0 %      Eosinophil % 2.0 %      Basophil % 0.9 %      Immature Grans % 0.6 %      Neutrophils, Absolute 3.90 10*3/mm3      Lymphocytes, Absolute 0.84 10*3/mm3      Monocytes, Absolute 0.49 10*3/mm3      Eosinophils, Absolute 0.11 10*3/mm3      Basophils, Absolute 0.05 10*3/mm3      Immature Grans, Absolute 0.03 10*3/mm3      nRBC 0.0 /100 WBC            Imaging:  CT Head Without Contrast    Result Date: 11/16/2021  PROCEDURE: CT HEAD WO CONTRAST  COMPARISON:  None.  INDICATIONS: Head trauma, coagulopathy (Age 19-64y), fell down stairs yesterday, vomiting, headache  PROTOCOL:   Standard imaging protocol performed    RADIATION:   DLP: 1588.1mGy*cm   MA and/or KV was adjusted to minimize radiation dose.     TECHNIQUE: After obtaining the patient's consent, CT images were obtained without non-ionic intravenous contrast material.  FINDINGS:  No acute intracranial hemorrhage, mass, mass effect, or evidence of acute ischemia is seen.  There is mild global volume loss.  The basilar cisterns are patent. The skull is intact without displaced fracture. The visualized paranasal sinuses and mastoid air cells are clear.  CONCLUSION:  No acute intracranial abnormality.     CHARO ZAVALA MD       Electronically Signed and Approved By: CHARO ZAVALA MD on 11/16/2021 at 12:05             CT Chest With Contrast Diagnostic    Result Date: 11/16/2021  PROCEDURE: CT CHEST W CONTRAST DIAGNOSTIC  COMPARISON:  [facility], CT, CT ABDOMEN PELVIS W CONTRAST, 8/13/2021, 14:44.  [facility], CT, CT CHEST W CONTRAST DIAGNOSTIC, 8/13/2021, 14:44.  INDICATIONS: Fall, upper back and chest pain, bilateral shoulder pain,  hurts to breath  TECHNIQUE: After obtaining the patient's consent, CT images were obtained with non-ionic intravenous contrast material.   PROTOCOL:   Standard imaging protocol performed    RADIATION:   DLP: 1066.5mGy*cm   Automated exposure control was utilized to minimize radiation dose. CONTRAST: 100cc Isovue 370 I.V.  FINDINGS:  There is no evidence of acute thoracic aortic injury.  Heart size is not enlarged.  No coronary artery calcifications are seen.  There is no pericardial or pleural effusion.  Lungs are clear.  There is no pneumothorax.  There is bilateral gynecomastia.  The liver contour remains nodular, consistent with cirrhotic liver morphology.  Hypodense liver lesions are too small to characterize, but appear stable compared to previous CT.  Spleen remains enlarged, measuring up to 16.4 cm.  Moderate amount of upper abdominal ascites has increased compared to previous CT.  No acute osseous abnormality is identified.   CONCLUSION:  1. No evidence of acute injury in the chest. 2. Moderate upper abdominal ascites, which is increased compared to 8/13/2021 CT.  Stable findings of cirrhotic liver morphology and splenomegaly.      CHARO ZAVALA MD       Electronically Signed and Approved By: CHARO ZAVALA MD on 11/16/2021 at 12:12             CT Cervical Spine Without Contrast    Result Date: 11/16/2021  PROCEDURE: CT CERVICAL SPINE WO CONTRAST  COMPARISON: None  INDICATIONS: Neck trauma, uncomplicated (NEXUS/CCR neg) (Age 16-64y), fall, posterior neck pain, severe neck pain when turning head  PROTOCOL:   Standard imaging protocol performed    RADIATION:   DLP: 1066.5mGy*cm   MA and/or KV was adjusted to minimize radiation dose.     TECHNIQUE: After obtaining the patient's consent, multi-planar CT images were created without contrast material.   FINDINGS:  CT cervical spine reveals no paraspinal soft tissue abnormality seen.  No evidence of fracture or dislocation or bone tumor or osteomyelitis  discitis in the cervical spine.  The odontoid process is intact.  There is mild anterior subluxation the C4 cervical vertebra.  Degenerative disc disease and mild anterior posterior vertebral body hypertrophic spurring is seen at the C5-C6 and C6-C7 levels.  There is a an anatomic variation or old fracture in the posterior spinous process at the this is T1 level.  There is mild increase kyphotic curvature in the cervical spine  CONCLUSION:  1. Mild deformity of the posterior spinous process at the T1 level consistent with old fracture and anatomic variation. 2. No evidence of acute fracture or dislocation or spinal canal stenosis in the cervical spine.     INES PRIDE MD       Electronically Signed and Approved By: INES PRIDE MD on 11/16/2021 at 11:55                Procedures:  Procedures    Progress                      The patient was seen evaluated ED by me.  The above history physical examination was performed as documented.  CT scan of his head neck and chest including his thoracic spine and scapula did not show any evidence of an acute traumatic injury.  Upon further discussion with him patient has a known history of cirrhosis with ascites.  Patient is stable for discharge home.  Patient is asking for pain medication for home.  Upon reviewing his Adam, he had a 16-day prescription wrote on 11-2-21 and he had a narcotic written actually by myself in October.  Neither one of these prescriptions were written by her primary care provider as the one in November was performed by a nurse practitioner at one of [facility] urgent care centers.  Patient was advised he needs to follow-up with his PCP and any chronic pain medicine needs to come from him even though he reports an acute injury.      Medical Decision Making:  [facility]     Final diagnoses:   Cervical strain, acute, initial encounter   Contusion of right scapular region, initial encounter   Fall (on) (from) other stairs and steps,  initial encounter        Disposition:  ED Disposition     ED Disposition Condition Comment    Discharge Stable           Documentation assistance provided by Neymar Tompkins DO acting as scribe for Dr. Neymar Tompkins DO. Information recorded by the scribe was done at my direction and has been verified and validated by me.      Neymar Tompkins DO  11/18/21 8524

## 2021-11-16 NOTE — ED NOTES
Pt presents after a fall at home, pt reports that he was taking his laundry up the steps and missed on and went backwards. Pt reports that his neck, mid-back and low back are hurting.      Sylvia Mcknight, RN  11/16/21 3207

## 2021-11-16 NOTE — DISCHARGE INSTRUCTIONS
Cold compresses to affected areas.  Apply for 20 to 30 minutes.  Apply 3-5 times per day as needed.  Do not apply ice directly to the skin.  Home medications as previously prescribed.  Follow-up with primary care provider.  Call for next available appointment.

## 2021-11-17 DIAGNOSIS — M54.42 CHRONIC MIDLINE LOW BACK PAIN WITH LEFT-SIDED SCIATICA: ICD-10-CM

## 2021-11-17 DIAGNOSIS — G89.29 CHRONIC MIDLINE LOW BACK PAIN WITH LEFT-SIDED SCIATICA: ICD-10-CM

## 2021-11-17 RX ORDER — OXYCODONE AND ACETAMINOPHEN 10; 325 MG/1; MG/1
1 TABLET ORAL EVERY 8 HOURS PRN
Qty: 50 TABLET | Refills: 0 | OUTPATIENT
Start: 2021-11-17

## 2021-11-17 NOTE — TELEPHONE ENCOUNTER
CONTROLLED MEDICATION REFILL REQUEST     URINE DRUG SCREEN: POC Urine Drug Screen [facility] (11/10/2021 10:14)    LAST OFFICE VISIT: Office Visit with Britney Echevarria APRN (11/10/2021)    NARC CONSENT: MICROFILM RECORDS - SCAN - KARI NARC CONSENT IMP LOS (11/16/2021)    NEXT OFFICE VISIT: Appointment with Britney Echevarria APRN (12/14/2021)    MEDICATION: oxyCODONE-acetaminophen (PERCOCET)  MG per tablet (11/02/2021)    oxyCODONE-acetaminophen (PERCOCET)  MG per tablet        Sig: Take 1 tablet by mouth Every 8 (Eight) Hours As Needed for Moderate Pain .        Sent to pharmacy as: oxyCODONE-Acetaminophen  MG Oral Tablet (PERCOCET)        Class: Normal        Earliest Fill Date: 11/2/2021        Route: Oral        E-Prescribing Status: Receipt confirmed by pharmacy (11/2/2021 12:12 PM EDT)          PT IS NOT DUE FOR REFILL UNTIL 12/2/2022    PROVIDER PLEASE ADVISE

## 2021-11-17 NOTE — TELEPHONE ENCOUNTER
Caller: Nic Nicolas    Relationship: Self    Best call back number: [phone]    Requested Prescriptions:   Requested Prescriptions     Pending Prescriptions Disp Refills   • oxyCODONE-acetaminophen (PERCOCET)  MG per tablet 50 tablet 0     Sig: Take 1 tablet by mouth Every 8 (Eight) Hours As Needed for Moderate Pain .        [facility] be sent[facility] [address] [facility] [phone] [facility] [phone]      Additional details provided by patient: PATIENT STATES HE WENT TO ER FOR PAIN IN LOWER BACK AND LEG. PATIENT STATED THAT HE IS NEEDING A REFILL ON PAIN MEDICATION UNTIL HE CAN GET INTO PAIN MANAGEMENT     Does the patient have less than a 3 day supply:  [x] Yes  [] No

## 2021-11-18 ENCOUNTER — PATIENT OUTREACH (OUTPATIENT)
Dept: CASE MANAGEMENT | Facility: OTHER | Age: 56
End: 2021-11-18

## 2021-11-18 ENCOUNTER — TELEPHONE (OUTPATIENT)
Dept: INTERNAL MEDICINE | Facility: CLINIC | Age: 56
End: 2021-11-18

## 2021-11-18 ENCOUNTER — APPOINTMENT (OUTPATIENT)
Dept: INTERVENTIONAL RADIOLOGY/VASCULAR | Facility: HOSPITAL | Age: 56
End: 2021-11-18

## 2021-11-18 ENCOUNTER — APPOINTMENT (OUTPATIENT)
Dept: GENERAL RADIOLOGY | Facility: HOSPITAL | Age: 56
End: 2021-11-18

## 2021-11-18 ENCOUNTER — HOSPITAL ENCOUNTER (EMERGENCY)
Facility: HOSPITAL | Age: 56
Discharge: HOME | End: 2021-11-18
Attending: EMERGENCY MEDICINE | Admitting: EMERGENCY MEDICINE

## 2021-11-18 VITALS
WEIGHT: 314.16 LBS | OXYGEN SATURATION: 100 % | HEART RATE: 92 BPM | TEMPERATURE: 98.2 F | RESPIRATION RATE: 31 BRPM | BODY MASS INDEX: 47.61 KG/M2 | SYSTOLIC BLOOD PRESSURE: 161 MMHG | DIASTOLIC BLOOD PRESSURE: 91 MMHG | HEIGHT: 68 IN

## 2021-11-18 DIAGNOSIS — R18.8 OTHER ASCITES: ICD-10-CM

## 2021-11-18 DIAGNOSIS — R06.00 DYSPNEA, UNSPECIFIED TYPE: Primary | ICD-10-CM

## 2021-11-18 LAB
ALBUMIN SERPL-MCNC: 3 G/DL (ref 3.5–5.2)
ALBUMIN/GLOB SERPL: 0.8 G/DL
ALP SERPL-CCNC: 162 U/L (ref 39–117)
ALT SERPL W P-5'-P-CCNC: 16 U/L (ref 1–41)
ANION GAP SERPL CALCULATED.3IONS-SCNC: 10 MMOL/L (ref 5–15)
APPEARANCE FLD: ABNORMAL
AST SERPL-CCNC: 43 U/L (ref 1–40)
BASOPHILS # BLD AUTO: 0.05 10*3/MM3 (ref 0–0.2)
BASOPHILS NFR BLD AUTO: 1 % (ref 0–1.5)
BILIRUB SERPL-MCNC: 2.3 MG/DL (ref 0–1.2)
BUN SERPL-MCNC: 7 MG/DL (ref 6–20)
BUN/CREAT SERPL: 9.7 (ref 7–25)
CALCIUM SPEC-SCNC: 8.5 MG/DL (ref 8.6–10.5)
CHLORIDE SERPL-SCNC: 101 MMOL/L (ref 98–107)
CO2 SERPL-SCNC: 26 MMOL/L (ref 22–29)
COLOR FLD: YELLOW
CREAT SERPL-MCNC: 0.72 MG/DL (ref 0.76–1.27)
DEPRECATED RDW RBC AUTO: 51.8 FL (ref 37–54)
EOSINOPHIL # BLD AUTO: 0.13 10*3/MM3 (ref 0–0.4)
EOSINOPHIL NFR BLD AUTO: 2.6 % (ref 0.3–6.2)
ERYTHROCYTE [DISTWIDTH] IN BLOOD BY AUTOMATED COUNT: 15.9 % (ref 12.3–15.4)
GFR SERPL CREATININE-BSD FRML MDRD: 113 ML/MIN/1.73
GLOBULIN UR ELPH-MCNC: 3.7 GM/DL
GLUCOSE SERPL-MCNC: 222 MG/DL (ref 65–99)
HCT VFR BLD AUTO: 34.3 % (ref 37.5–51)
HGB BLD-MCNC: 11.4 G/DL (ref 13–17.7)
HOLD SPECIMEN: NORMAL
HOLD SPECIMEN: NORMAL
IMM GRANULOCYTES # BLD AUTO: 0.02 10*3/MM3 (ref 0–0.05)
IMM GRANULOCYTES NFR BLD AUTO: 0.4 % (ref 0–0.5)
INR PPP: 1.51 (ref 2–3)
LYMPHOCYTES # BLD AUTO: 0.93 10*3/MM3 (ref 0.7–3.1)
LYMPHOCYTES NFR BLD AUTO: 18.5 % (ref 19.6–45.3)
LYMPHOCYTES NFR FLD MANUAL: 52 %
MACROPHAGE FLUID: 20 %
MCH RBC QN AUTO: 29.6 PG (ref 26.6–33)
MCHC RBC AUTO-ENTMCNC: 33.2 G/DL (ref 31.5–35.7)
MCV RBC AUTO: 89.1 FL (ref 79–97)
MESOTHL CELL NFR FLD MANUAL: 8 %
MONOCYTES # BLD AUTO: 0.51 10*3/MM3 (ref 0.1–0.9)
MONOCYTES NFR BLD AUTO: 10.2 % (ref 5–12)
MONOCYTES NFR FLD: 14 %
NEUTROPHILS NFR BLD AUTO: 3.38 10*3/MM3 (ref 1.7–7)
NEUTROPHILS NFR BLD AUTO: 67.3 % (ref 42.7–76)
NEUTROPHILS NFR FLD MANUAL: 6 %
NRBC BLD AUTO-RTO: 0 /100 WBC (ref 0–0.2)
NT-PROBNP SERPL-MCNC: 47.4 PG/ML (ref 0–900)
NUC CELL # FLD: 275 /MM3
PLATELET # BLD AUTO: 142 10*3/MM3 (ref 140–450)
PMV BLD AUTO: 10.4 FL (ref 6–12)
POTASSIUM SERPL-SCNC: 3.7 MMOL/L (ref 3.5–5.2)
PROT SERPL-MCNC: 6.7 G/DL (ref 6–8.5)
PROTHROMBIN TIME: 15 SECONDS (ref 9.4–12)
QT INTERVAL: 357 MS
RBC # BLD AUTO: 3.85 10*6/MM3 (ref 4.14–5.8)
RBC # FLD AUTO: <2000 /MM3
SODIUM SERPL-SCNC: 137 MMOL/L (ref 136–145)
TROPONIN T SERPL-MCNC: <0.01 NG/ML (ref 0–0.03)
WBC NRBC COR # BLD: 5.02 10*3/MM3 (ref 3.4–10.8)
WHOLE BLOOD HOLD SPECIMEN: NORMAL
WHOLE BLOOD HOLD SPECIMEN: NORMAL

## 2021-11-18 PROCEDURE — 80053 COMPREHEN METABOLIC PANEL: CPT

## 2021-11-18 PROCEDURE — 99283 EMERGENCY DEPT VISIT LOW MDM: CPT

## 2021-11-18 PROCEDURE — 36415 COLL VENOUS BLD VENIPUNCTURE: CPT

## 2021-11-18 PROCEDURE — 85025 COMPLETE CBC W/AUTO DIFF WBC: CPT

## 2021-11-18 PROCEDURE — 93005 ELECTROCARDIOGRAM TRACING: CPT

## 2021-11-18 PROCEDURE — 25010000002 FUROSEMIDE PER 20 MG: Performed by: EMERGENCY MEDICINE

## 2021-11-18 PROCEDURE — 71045 X-RAY EXAM CHEST 1 VIEW: CPT

## 2021-11-18 PROCEDURE — 83880 ASSAY OF NATRIURETIC PEPTIDE: CPT

## 2021-11-18 PROCEDURE — 96376 TX/PRO/DX INJ SAME DRUG ADON: CPT

## 2021-11-18 PROCEDURE — 93005 ELECTROCARDIOGRAM TRACING: CPT | Performed by: EMERGENCY MEDICINE

## 2021-11-18 PROCEDURE — 84484 ASSAY OF TROPONIN QUANT: CPT

## 2021-11-18 PROCEDURE — 25010000002 FENTANYL CITRATE (PF) 50 MCG/ML SOLUTION: Performed by: EMERGENCY MEDICINE

## 2021-11-18 PROCEDURE — 85610 PROTHROMBIN TIME: CPT | Performed by: EMERGENCY MEDICINE

## 2021-11-18 PROCEDURE — 96375 TX/PRO/DX INJ NEW DRUG ADDON: CPT

## 2021-11-18 PROCEDURE — 76942 ECHO GUIDE FOR BIOPSY: CPT

## 2021-11-18 PROCEDURE — C1729 CATH, DRAINAGE: HCPCS

## 2021-11-18 PROCEDURE — 99284 EMERGENCY DEPT VISIT MOD MDM: CPT

## 2021-11-18 PROCEDURE — 89051 BODY FLUID CELL COUNT: CPT | Performed by: EMERGENCY MEDICINE

## 2021-11-18 PROCEDURE — 93010 ELECTROCARDIOGRAM REPORT: CPT | Performed by: INTERNAL MEDICINE

## 2021-11-18 PROCEDURE — P9047 ALBUMIN (HUMAN), 25%, 50ML: HCPCS | Performed by: EMERGENCY MEDICINE

## 2021-11-18 PROCEDURE — 96374 THER/PROPH/DIAG INJ IV PUSH: CPT

## 2021-11-18 PROCEDURE — 25010000002 ALBUMIN HUMAN 25% PER 50 ML: Performed by: EMERGENCY MEDICINE

## 2021-11-18 RX ORDER — FENTANYL CITRATE 50 UG/ML
25 INJECTION, SOLUTION INTRAMUSCULAR; INTRAVENOUS ONCE
Status: COMPLETED | OUTPATIENT
Start: 2021-11-18 | End: 2021-11-18

## 2021-11-18 RX ORDER — LIDOCAINE HYDROCHLORIDE 20 MG/ML
INJECTION, SOLUTION INFILTRATION; PERINEURAL
Status: COMPLETED
Start: 2021-11-18 | End: 2021-11-18

## 2021-11-18 RX ORDER — OXYCODONE AND ACETAMINOPHEN 10; 325 MG/1; MG/1
1 TABLET ORAL EVERY 8 HOURS PRN
Qty: 50 TABLET | Refills: 0 | OUTPATIENT
Start: 2021-11-18

## 2021-11-18 RX ORDER — FENTANYL CITRATE 50 UG/ML
75 INJECTION, SOLUTION INTRAMUSCULAR; INTRAVENOUS ONCE
Status: COMPLETED | OUTPATIENT
Start: 2021-11-18 | End: 2021-11-18

## 2021-11-18 RX ORDER — ALBUMIN (HUMAN) 12.5 G/50ML
25 SOLUTION INTRAVENOUS ONCE
Status: COMPLETED | OUTPATIENT
Start: 2021-11-18 | End: 2021-11-18

## 2021-11-18 RX ORDER — FUROSEMIDE 10 MG/ML
80 INJECTION INTRAMUSCULAR; INTRAVENOUS ONCE
Status: COMPLETED | OUTPATIENT
Start: 2021-11-18 | End: 2021-11-18

## 2021-11-18 RX ORDER — SODIUM CHLORIDE 0.9 % (FLUSH) 0.9 %
10 SYRINGE (ML) INJECTION AS NEEDED
Status: DISCONTINUED | OUTPATIENT
Start: 2021-11-18 | End: 2021-11-18 | Stop reason: HOSPADM

## 2021-11-18 RX ADMIN — ALBUMIN HUMAN 25 G: 0.25 SOLUTION INTRAVENOUS at 17:49

## 2021-11-18 RX ADMIN — FUROSEMIDE 80 MG: 10 INJECTION INTRAMUSCULAR; INTRAVENOUS at 12:38

## 2021-11-18 RX ADMIN — LIDOCAINE HYDROCHLORIDE 10 ML: 20 INJECTION, SOLUTION INFILTRATION; PERINEURAL at 14:30

## 2021-11-18 RX ADMIN — FENTANYL CITRATE 75 MCG: 50 INJECTION, SOLUTION INTRAMUSCULAR; INTRAVENOUS at 14:16

## 2021-11-18 RX ADMIN — FENTANYL CITRATE 25 MCG: 50 INJECTION, SOLUTION INTRAMUSCULAR; INTRAVENOUS at 12:44

## 2021-11-18 NOTE — ED NOTES
"Pt states he is \"full of fluid\" x 3 days; Pt abdomen is taught and firm, lower extremities noted to be firm and shiny. Pt reports SOB with position changes and an inability to lie flat.      Jade Weldon RN  11/18/21 1114    "

## 2021-11-18 NOTE — ED PROVIDER NOTES
Subjective   56-year-old male with history of chronic liver disease presents from the nursing facility for a fall.  Fall from bed.  EMS noted some bruising in his right flank.  Family state that he has been having difficulty taking his lactulose and has been increasingly confused and agitated.  The patient is not able to provide any other useful history.          Review of Systems   Unable to perform ROS: Mental status change       Past Medical History:   Diagnosis Date   • Asthma    • Cirrhosis (HCC)    • Colon polyps    • Diabetes mellitus (HCC)    • Hypertension    • Liver disease    • Reflux esophagitis    • Renal disorder    • Sleep apnea        No Known Allergies    Past Surgical History:   Procedure Laterality Date   • COLONOSCOPY  2018, 2020   • ENDOSCOPY  2019   • FRACTURE SURGERY     • LEG SURGERY     • PELVIC FRACTURE SURGERY         Family History   Problem Relation Age of Onset   • Stomach cancer Sister        Social History     Socioeconomic History   • Marital status:    Tobacco Use   • Smoking status: Never Smoker   • Smokeless tobacco: Never Used   Substance and Sexual Activity   • Alcohol use: Never   • Drug use: Never           Objective   Physical Exam  Vitals and nursing note reviewed.   Constitutional:       Appearance: He is not ill-appearing.   HENT:      Head: Normocephalic.   Eyes:      Pupils: Pupils are equal, round, and reactive to light.   Neck:      Vascular: No JVD.   Cardiovascular:      Rate and Rhythm: Normal rate and regular rhythm.   Pulmonary:      Effort: Tachypnea present.      Breath sounds: Normal breath sounds.   Chest:      Chest wall: No tenderness.   Abdominal:      Palpations: Abdomen is soft.      Tenderness: There is no abdominal tenderness.   Musculoskeletal:      Right lower leg: No tenderness.      Left lower leg: No tenderness.   Skin:     General: Skin is warm and dry.   Neurological:      Mental Status: He is alert. He is disoriented.      Comments:  "Spontaneously moving all extremities.   Psychiatric:         Behavior: Behavior is agitated.         Procedures           ED Course                                           MDM  Number of Diagnoses or Management Options     Amount and/or Complexity of Data Reviewed  Clinical lab tests: reviewed  Tests in the radiology section of CPT®: reviewed  Tests in the medicine section of CPT®: reviewed  Decide to obtain previous medical records or to obtain history from someone other than the patient: yes (PCP note 11-10:  \"Assessment      Assessment and Plan    Diagnoses and all orders for this visit:     1. Long term use of drug (Primary)  -     POC Urine Drug Screen [facility]     2. Chronic midline low back pain with left-sided sciatica  Comments:  CCM working on pain management referral   Orders:  -     Ambulatory Referral to Pain Management     3. Chronic pain due to trauma  -     Ambulatory Referral to Pain Management     4. Lower extremity edema  -     furosemide (LASIX) 40 MG tablet; Take 2 tablets by mouth 3 (Three) Times a Day for 30 days.  Dispense: 180 tablet; Refill: 0     5. Hypertension, unspecified type  Comments:  increased BP meds dosage   Orders:  -     propranolol (INDERAL) 20 MG tablet; Take 3 tablets by mouth 2 (Two) Times a Day for 30 days.  Dispense: 180 tablet; Refill: 0     6. Home help needed  -     Cancel: Ambulatory Referral to Social Work  -     Ambulatory Referral to Social Care Services (Amb Case Mgmt)     7. Long-term use of high-risk medication  -     POC Urine Drug Screen [facility]     8. Type 2 diabetes mellitus with diabetic autonomic neuropathy, with long-term current use of insulin (HCC)  -     MicroAlbumin, Urine, Random - Urine, Clean Catch  -     Ambulatory Referral to Diabetic Education  -     Ambulatory Referral to Diabetes Care Clinic - Erin\")  Obtain history from someone other than the patient: yes (Friend.)  Review and summarize past medical records: yes (The " patient has a significant history of liver disease.  Recently fired from a pain clinic due to marijuana use.)  Discuss the patient with other providers: yes (Case discussed with the radiologist who agrees to assist with paracentesis.)      This is a 56-year-old male with history of cirrhosis who presents for tense ascites and shortness of breath when supine.  This is caused him difficulty sleeping.  The patient has been out of touch with his family physician and was recently discharged from a pain management practice.  His pain has been controlled.  His labs do not reflect a likely cardiopulmonary cause for his fluid retention and I suspect cirrhosis as the cause.  Will consult radiology for paracentesis and reevaluate after the procedure to help determine the need for further admission or evaluation.    Differential diagnosis includes sepsis, urinary tract infection, hyperammonemia, and hypoglycemia among others.    EKG interpretation: Sinus tachycardia.  Rate 109.  Normal P wave and VT interval.  Normal QRS and axis.  Normal QTc interval.  ST segments are normal.  No significant change from prior EKG.  Interpreted myself.    Pulse oximetry interpretation: 100% SPO2 on room air at rest.  Normal.    Cardiac monitor interpretation: Normal sinus rhythm.  Rate 92.    The patient's work-up indicates a few white blood cells in the body fluid ascites, slightly elevated INR, hyperglycemia and mild transaminitis on metabolic panel, normal troponin, normal BNP, and chronic anemia on CBC.  The patient's chest x-ray shows no acute process.    On final reevaluation after paracentesis performed by radiology and albumin the patient is feeling better and will follow up on an outpatient basis.  Final diagnoses:   Other ascites   Dyspnea, unspecified type       ED Disposition  ED Disposition     ED Disposition Condition Comment    Discharge Stable           Britney Echevarria, APRN  [address]  [address]  [facility]  "[id number]  [phone]    Call today  Make an appointment to reevaluate your shortness of breath.  Have this doctor schedule regular \"paracentesis\" procedures to help empty your belly.    [facility] EMERGENCY ROOM  [address] [facility] [address] [phone]    As needed, If symptoms worsen at any time    Sanju Bermudez MD  [address] [facility] [address]  [phone]    Call today  To make an appointment for further treatment of your cirrhosis and recurrent abdominal fluid.         Medication List      No changes were made to your prescriptions during this visit.          Dominick Morocho, DO  11/18/21 1929       Dominick Morocho, DO  11/18/21 2112    "

## 2021-11-18 NOTE — TELEPHONE ENCOUNTER
I will not be refilling patient's oxycodone. He and I have discussed this on multiple occassions. He is to see pain management.

## 2021-11-18 NOTE — OUTREACH NOTE
MSW attempted x4 to reach patient to assist with resources. Voicemails have been left with no return call. MSW to d/c but available if needed in the future.     JESSENIA Vernon   , Ambulatory Case Management    11/18/2021 15:58 EST

## 2021-11-18 NOTE — TELEPHONE ENCOUNTER
ATTEMPTED TO CONTACT PT PER PROVIDER'S INSTRUCTIONS     SPOKE TO [facility]    PT(PATIENT) WILL BE LEFT A MESSAGE WITH INSTRUCTIONS TO RETURN CALL TO OFFICE AT [phone]    OK FOR HUB TO ADVISE/READ   Britney Echevarria APRN 20 minutes ago (8:10 AM)        I will not be refilling patient's oxycodone. He and I have discussed this on multiple occassions. He is to see pain management.

## 2021-11-18 NOTE — TELEPHONE ENCOUNTER
Mr. Nicolas's friend called the clinic this morning upset that patient had not received his narcotic pain medication.  I told patient at our first visit that I would refill his medication for 2 weeks, but that he needed to get into pain management.  A referral has been placed for this.  It is documented in patient's record that they have attempted to call her multiple times and he has not returned their phone call.    Mr. Nicolas's friend also states that patient's abdomen is swollen and has increased in size since yesterday.  Patient also had a gastroenterology referral placed today and is documented in his record that they have attempted to call him 4 times to schedule an appointment and he has not returned their phone call explained that patient needs to call Dr. Holm's office and they can get him scheduled.    Also stated that if patient's abdomen has had significant swelling overnight he needs to go to the emergency department, as I do not have a way to aspirate ascites in the primary care setting.  Patient verbalized understanding.

## 2021-11-19 ENCOUNTER — OFFICE VISIT (OUTPATIENT)
Dept: GASTROENTEROLOGY | Facility: CLINIC | Age: 56
End: 2021-11-19

## 2021-11-19 VITALS
OXYGEN SATURATION: 99 % | BODY MASS INDEX: 44.82 KG/M2 | HEIGHT: 68 IN | WEIGHT: 295.7 LBS | SYSTOLIC BLOOD PRESSURE: 181 MMHG | HEART RATE: 107 BPM | DIASTOLIC BLOOD PRESSURE: 85 MMHG

## 2021-11-19 DIAGNOSIS — R18.8 CIRRHOSIS OF LIVER WITH ASCITES, UNSPECIFIED HEPATIC CIRRHOSIS TYPE (HCC): Primary | ICD-10-CM

## 2021-11-19 DIAGNOSIS — K74.60 CIRRHOSIS OF LIVER WITH ASCITES, UNSPECIFIED HEPATIC CIRRHOSIS TYPE (HCC): Primary | ICD-10-CM

## 2021-11-19 DIAGNOSIS — K76.0 NONALCOHOLIC FATTY LIVER DISEASE: ICD-10-CM

## 2021-11-19 PROCEDURE — 99215 OFFICE O/P EST HI 40 MIN: CPT | Performed by: NURSE PRACTITIONER

## 2021-11-19 RX ORDER — LACTULOSE 10 G/15ML
20 SOLUTION ORAL 2 TIMES DAILY
Qty: 473 ML | Refills: 2 | Status: SHIPPED | OUTPATIENT
Start: 2021-11-19 | End: 2021-12-14 | Stop reason: SDUPTHER

## 2021-11-19 RX ORDER — SPIRONOLACTONE 100 MG/1
200 TABLET, FILM COATED ORAL DAILY
Qty: 60 TABLET | Refills: 3 | Status: ON HOLD | OUTPATIENT
Start: 2021-11-19 | End: 2021-12-11 | Stop reason: SDUPTHER

## 2021-11-19 RX ORDER — ALBUMIN (HUMAN) 12.5 G/50ML
50 SOLUTION INTRAVENOUS ONCE
Status: CANCELLED | OUTPATIENT
Start: 2021-11-19 | End: 2021-11-19

## 2021-11-19 NOTE — PROGRESS NOTES
Chief Complaint   No chief complaint on file.  Cirrhosis    History of Present Illness       Nic Nicolas is a 56 y.o. male who presents to [facility] for follow-up after a long period of absence with a history of cirrhosis due to probable nonalcoholic fatty liver disease.  He was seen in the emergency department at [facility] yesterday where he had a paracentesis performed removing 6 L of fluid.  He also had a chest x-ray performed that was normal.  He continues on Lasix 80 mg, spironolactone 100 mg, and lactulose twice daily.  He reports continued abdominal swelling, leg swelling but denies any confusion or jaundice.  He denies fever, nausea, vomiting.  Patient denies alcohol abuse.  Patient denies fever, nausea, vomiting, weight loss, night sweats, melena, hematochezia, hematemesis.    Paracentesis 11/18/2021 6 L of fluid removed.    Chest x-ray 11/18/2021 normal    Review his colonoscopy by Dr. Roblero 02/2020 revealed grade 2 external hemorrhoids with band ligation and a tubular adenoma removed from the colon.  Recommend repeat colonoscopy 2025.      Review of his last EGD by Dr. Roblero 01/2019 revealed grade 1 esophagitis and gastritis with no evidence of varices.     Meld based on labs 11/18/2021 = 14    CT scan abdomen and pelvis with contrast 8/13/2021 findings consistent with cirrhosis with portal venous hypertension splenomegaly is stable.  Small amount of ascites small amount of ascites within dependent portions of the pelvis.  No evidence of solid abdominal organ injury.      Results       Result Review :       CMP    CMP 11/2/21 11/16/21 11/18/21   Glucose 280 (A) 158 (A) 222 (A)   BUN 14 8 7   Creatinine 0.80 0.73 (A) 0.72 (A)   eGFR Non African Am 100 112 113   Sodium 136 135 (A) 137   Potassium 4.1 3.8 3.7   Chloride 104 102 101   Calcium 8.1 (A) 8.7 8.5 (A)   Albumin 2.50 (A) 2.90 (A) 3.00 (A)   Total Bilirubin 1.6 (A) 2.4 (A) 2.3 (A)   Alkaline  Phosphatase 112 164 (A) 162 (A)   AST (SGOT) 49 (A) 51 (A) 43 (A)   ALT (SGPT) 20 18 16   (A) Abnormal value            CBC    CBC 11/2/21 11/16/21 11/18/21   WBC 2.88 (A) 5.42 5.02   RBC 3.17 (A) 3.87 (A) 3.85 (A)   Hemoglobin 9.2 (A) 11.2 (A) 11.4 (A)   Hematocrit 28.7 (A) 34.3 (A) 34.3 (A)   MCV 90.5 88.6 89.1   MCH 29.0 28.9 29.6   MCHC 32.1 32.7 33.2   RDW 14.1 15.6 (A) 15.9 (A)   Platelets 71 (A) 140 142   (A) Abnormal value                      Past Medical History       Past Medical History:   Diagnosis Date   • Asthma    • Cirrhosis (HCC)    • Colon polyps    • Diabetes mellitus (HCC)    • Hypertension    • Liver disease    • Reflux esophagitis    • Renal disorder    • Sleep apnea        Past Surgical History:   Procedure Laterality Date   • COLONOSCOPY  2018, 2020   • ENDOSCOPY  2019   • FRACTURE SURGERY     • LEG SURGERY     • PELVIC FRACTURE SURGERY     • UPPER GASTROINTESTINAL ENDOSCOPY           Current Outpatient Medications:   •  albuterol sulfate  (90 Base) MCG/ACT inhaler, Inhale 2 puffs Every 4 (Four) Hours As Needed for Wheezing., Disp: 90 g, Rfl: 0  •  amLODIPine (NORVASC) 10 MG tablet, Take 1 tablet by mouth Daily., Disp: 30 tablet, Rfl: 0  •  baclofen (LIORESAL) 10 MG tablet, Take 1 tablet by mouth 3 (Three) Times a Day., Disp: 20 tablet, Rfl: 0  •  Blood Glucose Monitoring Suppl kit, See Admin Instructions., Disp: , Rfl:   •  busPIRone (BUSPAR) 5 MG tablet, Take 1 tablet by mouth 3 (Three) Times a Day., Disp: 30 tablet, Rfl: 0  •  cyclobenzaprine (FLEXERIL) 10 MG tablet, Take 1 tablet by mouth 3 (Three) Times a Day As Needed for Muscle Spasms., Disp: 12 tablet, Rfl: 0  •  furosemide (LASIX) 40 MG tablet, Take 2 tablets by mouth 3 (Three) Times a Day for 30 days., Disp: 180 tablet, Rfl: 0  •  glimepiride (Amaryl) 1 MG tablet, Take 1 tablet by mouth Every Morning Before Breakfast for 30 days., Disp: 30 tablet, Rfl: 0  •  glucose blood test strip, 1 strip., Disp: , Rfl:   •  Insulin  "Glargine (LANTUS SOLOSTAR) 100 UNIT/ML injection pen, Inject 100 Units under the skin into the appropriate area as directed Daily for 30 days., Disp: 10 pen, Rfl: 0  •  omeprazole (priLOSEC) 20 MG capsule, Take 1 capsule by mouth Daily for 30 days., Disp: 30 capsule, Rfl: 0  •  ondansetron ODT (ZOFRAN-ODT) 4 MG disintegrating tablet, Place 1 tablet under the tongue 4 (Four) Times a Day As Needed for Nausea or Vomiting., Disp: 30 tablet, Rfl: 0  •  oxyCODONE-acetaminophen (PERCOCET)  MG per tablet, Take 1 tablet by mouth Every 8 (Eight) Hours As Needed for Moderate Pain ., Disp: 50 tablet, Rfl: 0  •  Potassium 99 MG tablet, Apply 99 mg to the mouth or throat 2 (Two) Times a Day., Disp: 180 each, Rfl: 1  •  propranolol (INDERAL) 20 MG tablet, Take 3 tablets by mouth 2 (Two) Times a Day for 30 days., Disp: 180 tablet, Rfl: 0  •  sertraline (Zoloft) 50 MG tablet, Take 1 tablet by mouth Daily., Disp: 90 tablet, Rfl: 1  •  spironolactone (ALDACTONE) 100 MG tablet, Take 2 tablets by mouth Daily., Disp: 60 tablet, Rfl: 3  •  lactulose (CHRONULAC) 10 GM/15ML solution, Take 30 mL by mouth 2 (Two) Times a Day., Disp: 473 mL, Rfl: 2  No current facility-administered medications for this visit.     No Known Allergies    Family History   Problem Relation Age of Onset   • Stomach cancer Sister    • Lung cancer Father    • Colon cancer Neg Hx         Social History     Social History Narrative   • Not on file       Objective       Vital Signs:   BP (!) 181/85 (BP Location: Left arm, Patient Position: Sitting, Cuff Size: Adult)   Pulse 107   Ht 172.7 cm (68\")   Wt 134 kg (295 lb 11.2 oz)   SpO2 99%   BMI 44.96 kg/m²       Physical Exam  Constitutional:       General: He is not in acute distress.     Appearance: Normal appearance.   HENT:      Head: Normocephalic.   Eyes:      Conjunctiva/sclera: Conjunctivae normal.      Pupils: Pupils are equal, round, and reactive to light.      Visual Fields: Right eye visual fields " normal and left eye visual fields normal.   Neck:      Trachea: Trachea normal.   Cardiovascular:      Rate and Rhythm: Normal rate and regular rhythm.      Heart sounds: Normal heart sounds.   Pulmonary:      Effort: Pulmonary effort is normal.      Breath sounds: Normal breath sounds and air entry.      Comments: Inspection of chest: normal appearance  Abdominal:      General: Abdomen is flat. Bowel sounds are normal.      Palpations: Abdomen is soft. There is no mass.      Tenderness: There is no guarding.   Musculoskeletal:      Right lower leg: No edema.      Left lower leg: No edema.   Skin:     Findings: No lesion.      Comments: Turgor is normal   Neurological:      Mental Status: He is alert and oriented to person, place, and time.   Psychiatric:         Mood and Affect: Mood and affect normal.           Assessment & Plan          Assessment and Plan    Diagnoses and all orders for this visit:    1. Cirrhosis of liver with ascites, unspecified hepatic cirrhosis type (HCC) (Primary)  -     Comprehensive Metabolic Panel; Future  -     US Paracentesis; Future    2. Nonalcoholic fatty liver disease  -     spironolactone (ALDACTONE) 100 MG tablet; Take 2 tablets by mouth Daily.  Dispense: 60 tablet; Refill: 3  -     Comprehensive Metabolic Panel; Future  -     US Paracentesis; Future    Other orders  -     lactulose (CHRONULAC) 10 GM/15ML solution; Take 30 mL by mouth 2 (Two) Times a Day.  Dispense: 473 mL; Refill: 2    56-year-old male presenting to the office today for a follow-up with a history of cirrhosis related to nonalcoholic fatty liver disease.  He continues on Lasix 80 mg he is currently on spironolactone 100 mg and lactulose twice daily.  I will increase the spironolactone from 100 mg to 200 mg.  He will repeat CMP in 2 weeks.  Patient continues to have abdominal swelling I have set him up for paracentesis in 10 days.  Patient will follow up in the office in 6 weeks.  He has a primary care follow-up  next week.  I have encouraged the patient to maintain this appointment.  I have encouraged the patient to adhere to a low-sodium diet.  We have discussed reasons to be seen in the emergency department.  We will plan for an EGD in the upcoming months.  Patient agreeable to this plan and will call with any questions or concerns.  I have reviewed this patient's course with Dr. Roblero.        I spent 65 minutes caring for Nic on this date of service. This time includes time spent by me in the following activities:preparing for the visit, reviewing tests, obtaining and/or reviewing a separately obtained history, performing a medically appropriate examination and/or evaluation , counseling and educating the patient/family/caregiver, ordering medications, tests, or procedures, documenting information in the medical record, independently interpreting results and communicating that information with the patient/family/caregiver and care coordination    Follow Up       Follow Up   Return in about 1 month (around 12/19/2021).  Patient was given instructions and counseling regarding his condition or for health maintenance advice. Please see specific information pulled into the AVS if appropriate.

## 2021-11-19 NOTE — PATIENT INSTRUCTIONS
Fatty Liver Disease    Fatty liver disease occurs when too much fat has built up in your liver cells. Fatty liver disease is also called hepatic steatosis or steatohepatitis. The liver removes harmful substances from your bloodstream and produces fluids that your body needs. It also helps your body use and store energy from the food you eat.  In many cases, fatty liver disease does not cause symptoms or problems. It is often diagnosed when tests are being done for other reasons. However, over time, fatty liver can cause inflammation that may lead to more serious liver problems, such as scarring of the liver (cirrhosis) and liver failure.  Fatty liver is associated with insulin resistance, increased body fat, high blood pressure (hypertension), and high cholesterol. These are features of metabolic syndrome and increase your risk for stroke, diabetes, and heart disease.  What are the causes?  This condition may be caused by:  · Drinking too much alcohol.  · Poor nutrition.  · Obesity.  · Cushing's syndrome.  · Diabetes.  · High cholesterol.  · Certain drugs.  · Poisons.  · Some viral infections.  · Pregnancy.  What increases the risk?  You are more likely to develop this condition if you:  · Abuse alcohol.  · Are overweight.  · Have diabetes.  · Have hepatitis.  · Have a high triglyceride level.  · Are pregnant.  What are the signs or symptoms?  Fatty liver disease often does not cause symptoms. If symptoms do develop, they can include:  · Fatigue.  · Weakness.  · Weight loss.  · Confusion.  · Abdominal pain.  · Nausea and vomiting.  · Yellowing of your skin and the white parts of your eyes (jaundice).  · Itchy skin.  How is this diagnosed?  This condition may be diagnosed by:  · A physical exam and medical history.  · Blood tests.  · Imaging tests, such as an ultrasound, CT scan, or MRI.  · A liver biopsy. A small sample of liver tissue is removed using a needle. The sample is then looked at under a microscope.  How  is this treated?  Fatty liver disease is often caused by other health conditions. Treatment for fatty liver may involve medicines and lifestyle changes to manage conditions such as:  · Alcoholism.  · High cholesterol.  · Diabetes.  · Being overweight or obese.  Follow these instructions at home:    · Do not drink alcohol. If you have trouble quitting, ask your health care provider how to safely quit with the help of medicine or a supervised program. This is important to keep your condition from getting worse.  · Eat a healthy diet as told by your health care provider. Ask your health care provider about working with a diet and nutrition specialist (dietitian) to develop an eating plan.  · Exercise regularly. This can help you lose weight and control your cholesterol and diabetes. Talk to your health care provider about an exercise plan and which activities are best for you.  · Take over-the-counter and prescription medicines only as told by your health care provider.  · Keep all follow-up visits as told by your health care provider. This is important.  Contact a health care provider if:  You have trouble controlling your:  · Blood sugar. This is especially important if you have diabetes.  · Cholesterol.  · Drinking of alcohol.  Get help right away if:  · You have abdominal pain.  · You have jaundice.  · You have nausea and vomiting.  · You vomit blood or material that looks like coffee grounds.  · You have stools that are black, tar-like, or bloody.  Summary  · Fatty liver disease develops when too much fat builds up in the cells of your liver.  · Fatty liver disease often causes no symptoms or problems. However, over time, fatty liver can cause inflammation that may lead to more serious liver problems, such as scarring of the liver (cirrhosis).  · You are more likely to develop this condition if you abuse alcohol, are pregnant, are overweight, have diabetes, have hepatitis, or have high triglyceride  levels.  · Contact your health care provider if you have trouble controlling your weight, blood sugar, cholesterol, or drinking of alcohol.  This information is not intended to replace advice given to you by your health care provider. Make sure you discuss any questions you have with your health care provider.  Document Revised: 11/30/2018 Document Reviewed: 09/26/2018  [employer] Patient Education © 2021 [employer]

## 2021-11-22 ENCOUNTER — TELEPHONE (OUTPATIENT)
Dept: INTERNAL MEDICINE | Facility: CLINIC | Age: 56
End: 2021-11-22

## 2021-11-22 NOTE — TELEPHONE ENCOUNTER
MR. WRIGHT IS REQUESTING RX FOR HIS PAIN MEDICATION. HE's TAKING OXYCODONE 10MG, TAKEN 4X A DAY. HIS APPOINTMENT WITH THE PAIN SPECIALIST IS ON 09 DEC 2021. MANY THANKS

## 2021-11-23 ENCOUNTER — TELEPHONE (OUTPATIENT)
Dept: INTERNAL MEDICINE | Facility: CLINIC | Age: 56
End: 2021-11-23

## 2021-11-24 ENCOUNTER — OFFICE VISIT (OUTPATIENT)
Dept: INTERNAL MEDICINE | Facility: CLINIC | Age: 56
End: 2021-11-24

## 2021-11-24 VITALS
OXYGEN SATURATION: 96 % | DIASTOLIC BLOOD PRESSURE: 81 MMHG | WEIGHT: 309.8 LBS | HEIGHT: 68 IN | BODY MASS INDEX: 46.95 KG/M2 | SYSTOLIC BLOOD PRESSURE: 161 MMHG | TEMPERATURE: 98.2 F | HEART RATE: 110 BPM

## 2021-11-24 DIAGNOSIS — R11.0 NAUSEA: ICD-10-CM

## 2021-11-24 DIAGNOSIS — G89.4 CHRONIC PAIN SYNDROME: Primary | ICD-10-CM

## 2021-11-24 PROCEDURE — 96372 THER/PROPH/DIAG INJ SC/IM: CPT | Performed by: NURSE PRACTITIONER

## 2021-11-24 PROCEDURE — 99215 OFFICE O/P EST HI 40 MIN: CPT | Performed by: NURSE PRACTITIONER

## 2021-11-24 RX ORDER — ONDANSETRON 4 MG/1
4 TABLET, ORALLY DISINTEGRATING ORAL 4 TIMES DAILY PRN
Qty: 30 TABLET | Refills: 0 | Status: SHIPPED | OUTPATIENT
Start: 2021-11-24 | End: 2021-12-14 | Stop reason: SDUPTHER

## 2021-11-29 ENCOUNTER — LAB (OUTPATIENT)
Dept: LAB | Facility: HOSPITAL | Age: 56
End: 2021-11-29

## 2021-11-29 ENCOUNTER — HOSPITAL ENCOUNTER (OUTPATIENT)
Dept: INTERVENTIONAL RADIOLOGY/VASCULAR | Facility: HOSPITAL | Age: 56
Discharge: HOME | End: 2021-11-29

## 2021-11-29 VITALS
DIASTOLIC BLOOD PRESSURE: 80 MMHG | RESPIRATION RATE: 18 BRPM | HEART RATE: 107 BPM | OXYGEN SATURATION: 98 % | SYSTOLIC BLOOD PRESSURE: 170 MMHG

## 2021-11-29 DIAGNOSIS — K76.0 NONALCOHOLIC FATTY LIVER DISEASE: ICD-10-CM

## 2021-11-29 DIAGNOSIS — R18.8 CIRRHOSIS OF LIVER WITH ASCITES, UNSPECIFIED HEPATIC CIRRHOSIS TYPE (HCC): ICD-10-CM

## 2021-11-29 DIAGNOSIS — K74.60 CIRRHOSIS OF LIVER WITH ASCITES, UNSPECIFIED HEPATIC CIRRHOSIS TYPE (HCC): ICD-10-CM

## 2021-11-29 LAB
ALBUMIN SERPL-MCNC: 2.7 G/DL (ref 3.5–5.2)
ALBUMIN/GLOB SERPL: 0.8 G/DL
ALP SERPL-CCNC: 125 U/L (ref 39–117)
ALT SERPL W P-5'-P-CCNC: 13 U/L (ref 1–41)
ANION GAP SERPL CALCULATED.3IONS-SCNC: 4.3 MMOL/L (ref 5–15)
AST SERPL-CCNC: 38 U/L (ref 1–40)
BILIRUB SERPL-MCNC: 2.4 MG/DL (ref 0–1.2)
BUN SERPL-MCNC: 8 MG/DL (ref 6–20)
BUN/CREAT SERPL: 11.8 (ref 7–25)
CALCIUM SPEC-SCNC: 8.1 MG/DL (ref 8.6–10.5)
CHLORIDE SERPL-SCNC: 104 MMOL/L (ref 98–107)
CO2 SERPL-SCNC: 28.7 MMOL/L (ref 22–29)
CREAT SERPL-MCNC: 0.68 MG/DL (ref 0.76–1.27)
GFR SERPL CREATININE-BSD FRML MDRD: 121 ML/MIN/1.73
GLOBULIN UR ELPH-MCNC: 3.2 GM/DL
GLUCOSE SERPL-MCNC: 165 MG/DL (ref 65–99)
POTASSIUM SERPL-SCNC: 4.2 MMOL/L (ref 3.5–5.2)
PROT SERPL-MCNC: 5.9 G/DL (ref 6–8.5)
SODIUM SERPL-SCNC: 137 MMOL/L (ref 136–145)

## 2021-11-29 PROCEDURE — 36415 COLL VENOUS BLD VENIPUNCTURE: CPT

## 2021-11-29 PROCEDURE — 25010000002 ALBUMIN HUMAN 25% PER 50 ML: Performed by: NURSE PRACTITIONER

## 2021-11-29 PROCEDURE — P9047 ALBUMIN (HUMAN), 25%, 50ML: HCPCS | Performed by: NURSE PRACTITIONER

## 2021-11-29 PROCEDURE — 80053 COMPREHEN METABOLIC PANEL: CPT

## 2021-11-29 PROCEDURE — C1729 CATH, DRAINAGE: HCPCS

## 2021-11-29 RX ORDER — ALBUMIN (HUMAN) 12.5 G/50ML
25 SOLUTION INTRAVENOUS ONCE
Status: COMPLETED | OUTPATIENT
Start: 2021-11-29 | End: 2021-11-29

## 2021-11-29 RX ORDER — ALBUMIN (HUMAN) 12.5 G/50ML
50 SOLUTION INTRAVENOUS ONCE
Status: DISCONTINUED | OUTPATIENT
Start: 2021-11-29 | End: 2021-11-29

## 2021-11-29 RX ORDER — LIDOCAINE HYDROCHLORIDE 20 MG/ML
INJECTION, SOLUTION INFILTRATION; PERINEURAL
Status: COMPLETED
Start: 2021-11-29 | End: 2021-11-29

## 2021-11-29 RX ADMIN — ALBUMIN HUMAN 25 G: 0.25 SOLUTION INTRAVENOUS at 13:47

## 2021-11-29 RX ADMIN — LIDOCAINE HYDROCHLORIDE 10 ML: 20 INJECTION, SOLUTION INFILTRATION; PERINEURAL at 12:50

## 2021-11-29 RX ADMIN — ALBUMIN HUMAN 25 G: 0.25 SOLUTION INTRAVENOUS at 14:07

## 2021-11-29 NOTE — NURSING NOTE
Infusion complete, IV dc'd.  right lateral abdominal site without changes, denies pain/discomfort to site.  Leaving via wheelchair accompanied by RN to ER entrance to meet friend to transport home, no problems to note.

## 2021-11-30 RX ORDER — KETOROLAC TROMETHAMINE 30 MG/ML
60 INJECTION, SOLUTION INTRAMUSCULAR; INTRAVENOUS ONCE
Status: COMPLETED | OUTPATIENT
Start: 2021-11-30 | End: 2021-11-30

## 2021-11-30 RX ADMIN — KETOROLAC TROMETHAMINE 60 MG: 30 INJECTION, SOLUTION INTRAMUSCULAR; INTRAVENOUS at 12:09

## 2021-12-02 PROBLEM — Z01.810 PRE-OPERATIVE CARDIOVASCULAR EXAMINATION: Status: ACTIVE | Noted: 2018-05-21

## 2021-12-02 PROBLEM — R31.0 GROSS HEMATURIA: Status: ACTIVE | Noted: 2021-12-02

## 2021-12-02 PROBLEM — K21.9 ACID REFLUX: Status: ACTIVE | Noted: 2021-12-02

## 2021-12-02 PROBLEM — R39.11 HESITANCY OF MICTURITION: Status: ACTIVE | Noted: 2021-12-02

## 2021-12-03 ENCOUNTER — TELEPHONE (OUTPATIENT)
Dept: GASTROENTEROLOGY | Facility: CLINIC | Age: 56
End: 2021-12-03

## 2021-12-03 ENCOUNTER — PREP FOR SURGERY (OUTPATIENT)
Dept: OTHER | Facility: HOSPITAL | Age: 56
End: 2021-12-03

## 2021-12-03 ENCOUNTER — OFFICE VISIT (OUTPATIENT)
Dept: INTERNAL MEDICINE | Facility: CLINIC | Age: 56
End: 2021-12-03

## 2021-12-03 VITALS
HEART RATE: 109 BPM | OXYGEN SATURATION: 98 % | BODY MASS INDEX: 47.74 KG/M2 | SYSTOLIC BLOOD PRESSURE: 144 MMHG | WEIGHT: 315 LBS | DIASTOLIC BLOOD PRESSURE: 79 MMHG | HEIGHT: 68 IN | TEMPERATURE: 97.6 F

## 2021-12-03 DIAGNOSIS — G89.4 CHRONIC PAIN SYNDROME: ICD-10-CM

## 2021-12-03 DIAGNOSIS — D61.818 PANCYTOPENIA (HCC): ICD-10-CM

## 2021-12-03 DIAGNOSIS — K74.60 CIRRHOSIS OF LIVER WITH ASCITES, UNSPECIFIED HEPATIC CIRRHOSIS TYPE (HCC): ICD-10-CM

## 2021-12-03 DIAGNOSIS — E11.9 DIABETES MELLITUS WITHOUT COMPLICATION (HCC): Primary | ICD-10-CM

## 2021-12-03 DIAGNOSIS — E78.5 HYPERLIPIDEMIA, UNSPECIFIED HYPERLIPIDEMIA TYPE: ICD-10-CM

## 2021-12-03 DIAGNOSIS — R18.8 CIRRHOSIS OF LIVER WITH ASCITES, UNSPECIFIED HEPATIC CIRRHOSIS TYPE (HCC): Primary | ICD-10-CM

## 2021-12-03 DIAGNOSIS — K74.60 CIRRHOSIS OF LIVER WITH ASCITES, UNSPECIFIED HEPATIC CIRRHOSIS TYPE (HCC): Primary | ICD-10-CM

## 2021-12-03 DIAGNOSIS — K27.4 GASTROINTESTINAL HEMORRHAGE ASSOCIATED WITH PEPTIC ULCER: ICD-10-CM

## 2021-12-03 DIAGNOSIS — R18.8 CIRRHOSIS OF LIVER WITH ASCITES, UNSPECIFIED HEPATIC CIRRHOSIS TYPE (HCC): ICD-10-CM

## 2021-12-03 PROBLEM — F41.9 ANXIETY: Status: ACTIVE | Noted: 2021-12-03

## 2021-12-03 PROCEDURE — 99214 OFFICE O/P EST MOD 30 MIN: CPT | Performed by: INTERNAL MEDICINE

## 2021-12-03 RX ORDER — MORPHINE SULFATE 15 MG/1
15 TABLET ORAL EVERY 8 HOURS PRN
Qty: 21 TABLET | Refills: 0 | Status: SHIPPED | OUTPATIENT
Start: 2021-12-03 | End: 2021-12-14 | Stop reason: SDUPTHER

## 2021-12-03 RX ORDER — LANCETS 28 GAUGE
1 EACH MISCELLANEOUS AS NEEDED
Qty: 100 EACH | Refills: 3 | Status: SHIPPED | OUTPATIENT
Start: 2021-12-03 | End: 2021-12-14

## 2021-12-03 RX ORDER — GLUCOSAMINE HCL/CHONDROITIN SU 500-400 MG
1 CAPSULE ORAL
Qty: 100 EACH | Refills: 3 | Status: SHIPPED | OUTPATIENT
Start: 2021-12-03 | End: 2021-12-14

## 2021-12-03 RX ORDER — BLOOD-GLUCOSE METER
1 KIT MISCELLANEOUS ONCE
Qty: 1 EACH | Refills: 0 | Status: SHIPPED | OUTPATIENT
Start: 2021-12-03 | End: 2021-12-03

## 2021-12-03 RX ORDER — ALBUMIN (HUMAN) 12.5 G/50ML
12.5 SOLUTION INTRAVENOUS ONCE
Status: CANCELLED | OUTPATIENT
Start: 2021-12-03 | End: 2021-12-03

## 2021-12-03 RX ORDER — PEN NEEDLE, DIABETIC 31 GX5/16"
1 NEEDLE, DISPOSABLE MISCELLANEOUS
Qty: 200 EACH | Refills: 3 | Status: SHIPPED | OUTPATIENT
Start: 2021-12-03 | End: 2022-02-24

## 2021-12-03 NOTE — TELEPHONE ENCOUNTER
Per Erin, patient is able to call us at any time to schedule a paracentesis every 10 days as he needs it. Patient states he would like to schedule another one before he becomes more swollen. Will order and schedule. Patient aware.

## 2021-12-03 NOTE — PROGRESS NOTES
Chief Complaint  Establish Care (Patient was last seeing Britney Echevarria APRN. )    Subjective          Nic Nicolas presents to [facility]  History of Present Illness  Cirrhosis - patient reports having recurring paracentesis. Patient is follow-up with GI. Patient is on lactulose to help with ammonia levels. Patient also on lasix/aldactone to help with ascites. Patient is also on prilosec. Patient reports history of GI bleed.   Chronic pain - patient is follow-up with pain mgmt.   hypertension- patient is on amlodipine.   DM2- patient is on lantus 66U daily. Patient reports SEs with metformin.pt is on glimipiride. Patient reports he needs diabetic supplies.   Anxiety- patient is on buspar and off sertraline now.  Pancytopenia- patient is follow-up with blood doctor.      Current Outpatient Medications   Medication Instructions   • albuterol sulfate  (90 Base) MCG/ACT inhaler 2 puffs, Inhalation, Every 4 Hours PRN   • Alcohol Swabs (Alcohol Prep) pads 1 pad, Does not apply, 5 Times Daily   • amLODIPine (NORVASC) 10 mg, Oral, Daily   • busPIRone (BUSPAR) 5 mg, Oral, 3 Times Daily   • cyclobenzaprine (FLEXERIL) 10 mg, Oral, 3 Times Daily PRN   • furosemide (LASIX) 80 mg, Oral, 3 Times Daily   • glimepiride (AMARYL) 1 mg, Oral, Every Morning Before Breakfast   • Glucose Blood (Blood Glucose Test) strip 1 strip, In Vitro, 5 Times Daily   • glucose monitor monitoring kit 1 each, Does not apply, Once   • Insulin Glargine (LANTUS SOLOSTAR) 66 Units, Subcutaneous, Daily   • lactulose (CHRONULAC) 20 g, Oral, 2 Times Daily   • Lancets (freestyle) lancets 1 each, Other, As Needed   • Morphine (MSIR) 15 mg, Oral, Every 8 Hours PRN   • omeprazole (PRILOSEC) 20 mg, Oral, Daily   • ondansetron ODT (ZOFRAN-ODT) 4 mg, Sublingual, 4 Times Daily PRN   • Potassium 99 mg, Mouth/Throat, 2 Times Daily   • propranolol (INDERAL) 60 mg, Oral, 2 Times Daily   • spironolactone  "(ALDACTONE) 200 mg, Oral, Daily       Objective   Vital Signs:   /79 (BP Location: Right arm, Patient Position: Sitting, Cuff Size: Large Adult)   Pulse 109   Temp 97.6 °F (36.4 °C) (Temporal)   Ht 172.7 cm (68\")   Wt (!) 143 kg (315 lb)   SpO2 98%   BMI 47.90 kg/m²     Wt Readings from Last 3 Encounters:   12/03/21 (!) 143 kg (315 lb)   11/24/21 (!) 141 kg (309 lb 12.8 oz)   11/19/21 134 kg (295 lb 11.2 oz)     BP Readings from Last 3 Encounters:   12/03/21 144/79   11/29/21 170/80   11/24/21 161/81     Physical Exam   Appearance: No acute distress, well-nourished  Head: normocephalic, atraumatic  Eyes: extraocular movements intact, no scleral icterus, no conjunctival injection  Ears, Nose, and Throat: external ears normal, nares patent, moist mucous membranes  Cardiovascular: regular rate and rhythm. no murmurs, rales, or rhonchi. no edema  Respiratory: breathing comfortably, symmetric chest rise, clear to auscultation bilaterally. No wheezes, rales, or rhonchi.  Neuro: alert and oriented to time, place, and person. Normal gait  Psych: normal mood and affect     Result Review :   The following data was reviewed by: Jonh Franco Jr, MD on 12/03/2021:  Common labs    Common Labsle 11/16/21 11/16/21 11/18/21 11/18/21 11/29/21    1110 1110 1041 1041    Glucose  158 (A)  222 (A) 165 (A)   BUN  8  7 8   Creatinine  0.73 (A)  0.72 (A) 0.68 (A)   eGFR Non African Am  112  113 121   Sodium  135 (A)  137 137   Potassium  3.8  3.7 4.2   Chloride  102  101 104   Calcium  8.7  8.5 (A) 8.1 (A)   Albumin  2.90 (A)  3.00 (A) 2.70 (A)   Total Bilirubin  2.4 (A)  2.3 (A) 2.4 (A)   Alkaline Phosphatase  164 (A)  162 (A) 125 (A)   AST (SGOT)  51 (A)  43 (A) 38   ALT (SGPT)  18  16 13   WBC 5.42  5.02     Hemoglobin 11.2 (A)  11.4 (A)     Hematocrit 34.3 (A)  34.3 (A)     Platelets 140  142     (A) Abnormal value            Lab Results   Component Value Date    INR 1.51 (L) 11/18/2021        Assessment and Plan " {CC Problem List  Visit Diagnosis   ROS  Review (Popup)  [facility]  Quality  BestPractice  Medications  SmartSets  SnapShot Encounters  Media :23}   Diagnoses and all orders for this visit:    1. Diabetes mellitus without complication (HCC) (Primary)  Comments:  on lantus 66U daily. would like to potentnially start SGLT-2 for extra diuresis as well as GLP-1 for control with less insulin.  Orders:  -     Insulin Glargine (LANTUS SOLOSTAR) 100 UNIT/ML injection pen; Inject 66 Units under the skin into the appropriate area as directed Daily for 30 days.  Dispense: 15 pen; Refill: 3  -     glucose monitor monitoring kit; 1 each 1 (One) Time for 1 dose.  Dispense: 1 each; Refill: 0  -     Glucose Blood (Blood Glucose Test) strip; 1 strip by In Vitro route 5 (Five) Times a Day.  Dispense: 100 each; Refill: 3  -     Lancets (freestyle) lancets; 1 each by Other route As Needed (glucose check).  Dispense: 100 each; Refill: 3  -     Alcohol Swabs (Alcohol Prep) pads; 1 pad 5 (Five) Times a Day.  Dispense: 200 each; Refill: 3    2. Hyperlipidemia, unspecified hyperlipidemia type  Comments:  cont statin. monitor lipids.     3. Cirrhosis of liver with ascites, unspecified hepatic cirrhosis type (HCC)  Assessment & Plan:  CT ab/pelvis reviewed from 8/2021. imaging due again in 2/2022 for HCC screening. also obtain AFP. paracentesis every 2 weeks. cont f/u with GI. On lactulose to reduce ammonia. Aldactone increase to 200mg recently. Continue lasix regimen as well.       4. Pancytopenia (HCC)  Assessment & Plan:  Continue follow-up with hematology. Possibly related to chronic disease?      5. Chronic pain syndrome  Comments:  will refill immediate relase morphine until pt's appoint with pain management on 12/9/21. pt understands pain managemnt should handle his chornic pain therapy.   Orders:  -     Morphine (MSIR) 15 MG tablet; Take 1 tablet by mouth Every 8 (Eight) Hours As Needed for Severe Pain .   Dispense: 21 tablet; Refill: 0    6. Gastrointestinal hemorrhage associated with peptic ulcer  Assessment & Plan:  Need to continue PPI chronically.         Medications Discontinued During This Encounter   Medication Reason   • oxyCODONE-acetaminophen (PERCOCET)  MG per tablet Alternate therapy   • oxyCODONE-acetaminophen (PERCOCET)  MG per tablet Alternate therapy   • Blood Glucose Monitoring Suppl kit Alternate therapy   • baclofen (LIORESAL) 10 MG tablet Alternate therapy   • sertraline (Zoloft) 50 MG tablet Alternate therapy   • oxyCODONE-acetaminophen (PERCOCET)  MG per tablet Alternate therapy   • oxyCODONE-acetaminophen (PERCOCET)  MG per tablet Alternate therapy   • glucose blood test strip Duplicate order   • Insulin Glargine (LANTUS SOLOSTAR) 100 UNIT/ML injection pen Reorder        Follow Up   Return in about 4 weeks (around 12/31/2021) for Recheck.  Patient was given instructions and counseling regarding his condition or for health maintenance advice. Please see specific information pulled into the AVS if appropriate.

## 2021-12-03 NOTE — ASSESSMENT & PLAN NOTE
CT ab/pelvis reviewed from 8/2021. imaging due again in 2/2022 for HCC screening. also obtain AFP. paracentesis every 2 weeks. cont f/u with GI. On lactulose to reduce ammonia. Aldactone increase to 200mg recently. Continue lasix regimen as well.

## 2021-12-05 ENCOUNTER — APPOINTMENT (OUTPATIENT)
Dept: CT IMAGING | Facility: HOSPITAL | Age: 56
End: 2021-12-05

## 2021-12-05 ENCOUNTER — HOSPITAL ENCOUNTER (INPATIENT)
Facility: HOSPITAL | Age: 56
LOS: 6 days | Discharge: HOME HEALTH | End: 2021-12-11
Attending: EMERGENCY MEDICINE | Admitting: INTERNAL MEDICINE

## 2021-12-05 ENCOUNTER — APPOINTMENT (OUTPATIENT)
Dept: GENERAL RADIOLOGY | Facility: HOSPITAL | Age: 56
End: 2021-12-05

## 2021-12-05 DIAGNOSIS — K76.82 HEPATIC ENCEPHALOPATHY (HCC): Primary | ICD-10-CM

## 2021-12-05 DIAGNOSIS — R18.8 CIRRHOSIS OF LIVER WITH ASCITES, UNSPECIFIED HEPATIC CIRRHOSIS TYPE (HCC): ICD-10-CM

## 2021-12-05 DIAGNOSIS — K74.60 CIRRHOSIS OF LIVER WITH ASCITES, UNSPECIFIED HEPATIC CIRRHOSIS TYPE (HCC): ICD-10-CM

## 2021-12-05 DIAGNOSIS — N17.9 AKI (ACUTE KIDNEY INJURY) (HCC): ICD-10-CM

## 2021-12-05 DIAGNOSIS — K76.0 NONALCOHOLIC FATTY LIVER DISEASE: ICD-10-CM

## 2021-12-05 LAB
ALBUMIN SERPL-MCNC: 2.6 G/DL (ref 3.5–5.2)
ALBUMIN/GLOB SERPL: 0.9 G/DL
ALP SERPL-CCNC: 157 U/L (ref 39–117)
ALT SERPL W P-5'-P-CCNC: 11 U/L (ref 1–41)
AMMONIA BLD-SCNC: 131 UMOL/L (ref 16–60)
AMPHET+METHAMPHET UR QL: NEGATIVE
ANION GAP SERPL CALCULATED.3IONS-SCNC: 16.3 MMOL/L (ref 5–15)
APAP SERPL-MCNC: <5 MCG/ML (ref 0–30)
AST SERPL-CCNC: 28 U/L (ref 1–40)
BACTERIA UR QL AUTO: ABNORMAL /HPF
BARBITURATES UR QL SCN: NEGATIVE
BASOPHILS # BLD AUTO: 0.01 10*3/MM3 (ref 0–0.2)
BASOPHILS NFR BLD AUTO: 0.2 % (ref 0–1.5)
BENZODIAZ UR QL SCN: NEGATIVE
BILIRUB SERPL-MCNC: 2.2 MG/DL (ref 0–1.2)
BILIRUB UR QL STRIP: ABNORMAL
BUN SERPL-MCNC: 30 MG/DL (ref 6–20)
BUN/CREAT SERPL: 14.4 (ref 7–25)
CALCIUM SPEC-SCNC: 8.1 MG/DL (ref 8.6–10.5)
CANNABINOIDS SERPL QL: NEGATIVE
CHLORIDE SERPL-SCNC: 102 MMOL/L (ref 98–107)
CK SERPL-CCNC: 84 U/L (ref 20–200)
CLARITY UR: ABNORMAL
CO2 SERPL-SCNC: 18.7 MMOL/L (ref 22–29)
COCAINE UR QL: NEGATIVE
COLOR UR: ABNORMAL
CREAT SERPL-MCNC: 2.08 MG/DL (ref 0.76–1.27)
DEPRECATED RDW RBC AUTO: 50.4 FL (ref 37–54)
EOSINOPHIL # BLD AUTO: 0 10*3/MM3 (ref 0–0.4)
EOSINOPHIL NFR BLD AUTO: 0 % (ref 0.3–6.2)
ERYTHROCYTE [DISTWIDTH] IN BLOOD BY AUTOMATED COUNT: 15.4 % (ref 12.3–15.4)
ETHANOL BLD-MCNC: <10 MG/DL (ref 0–10)
ETHANOL UR QL: <0.01 %
GFR SERPL CREATININE-BSD FRML MDRD: 33 ML/MIN/1.73
GLOBULIN UR ELPH-MCNC: 3 GM/DL
GLUCOSE BLDC GLUCOMTR-MCNC: 175 MG/DL (ref 70–99)
GLUCOSE BLDC GLUCOMTR-MCNC: 222 MG/DL (ref 70–99)
GLUCOSE SERPL-MCNC: 243 MG/DL (ref 65–99)
GLUCOSE UR STRIP-MCNC: NEGATIVE MG/DL
HCT VFR BLD AUTO: 26 % (ref 37.5–51)
HGB BLD-MCNC: 8.7 G/DL (ref 13–17.7)
HGB UR QL STRIP.AUTO: ABNORMAL
HOLD SPECIMEN: NORMAL
HOLD SPECIMEN: NORMAL
HYALINE CASTS UR QL AUTO: ABNORMAL /LPF
IMM GRANULOCYTES # BLD AUTO: 0.1 10*3/MM3 (ref 0–0.05)
IMM GRANULOCYTES NFR BLD AUTO: 1.6 % (ref 0–0.5)
KETONES UR QL STRIP: NEGATIVE
LEUKOCYTE ESTERASE UR QL STRIP.AUTO: ABNORMAL
LYMPHOCYTES # BLD AUTO: 0.38 10*3/MM3 (ref 0.7–3.1)
LYMPHOCYTES NFR BLD AUTO: 6 % (ref 19.6–45.3)
MCH RBC QN AUTO: 29.8 PG (ref 26.6–33)
MCHC RBC AUTO-ENTMCNC: 33.5 G/DL (ref 31.5–35.7)
MCV RBC AUTO: 89 FL (ref 79–97)
METHADONE UR QL SCN: NEGATIVE
MONOCYTES # BLD AUTO: 0.36 10*3/MM3 (ref 0.1–0.9)
MONOCYTES NFR BLD AUTO: 5.7 % (ref 5–12)
NEUTROPHILS NFR BLD AUTO: 5.5 10*3/MM3 (ref 1.7–7)
NEUTROPHILS NFR BLD AUTO: 86.5 % (ref 42.7–76)
NITRITE UR QL STRIP: NEGATIVE
NRBC BLD AUTO-RTO: 0 /100 WBC (ref 0–0.2)
NT-PROBNP SERPL-MCNC: 734.2 PG/ML (ref 0–900)
OPIATES UR QL: POSITIVE
OXYCODONE UR QL SCN: NEGATIVE
PH UR STRIP.AUTO: 5.5 [PH] (ref 5–8)
PLATELET # BLD AUTO: 63 10*3/MM3 (ref 140–450)
PMV BLD AUTO: 11.7 FL (ref 6–12)
POTASSIUM SERPL-SCNC: 4.1 MMOL/L (ref 3.5–5.2)
PROT SERPL-MCNC: 5.6 G/DL (ref 6–8.5)
PROT UR QL STRIP: ABNORMAL
RBC # BLD AUTO: 2.92 10*6/MM3 (ref 4.14–5.8)
RBC # UR STRIP: ABNORMAL /HPF
REF LAB TEST METHOD: ABNORMAL
SALICYLATES SERPL-MCNC: <0.3 MG/DL
SARS-COV-2 N GENE RESP QL NAA+PROBE: NOT DETECTED
SODIUM SERPL-SCNC: 137 MMOL/L (ref 136–145)
SP GR UR STRIP: 1.02 (ref 1–1.03)
SQUAMOUS #/AREA URNS HPF: ABNORMAL /HPF
UROBILINOGEN UR QL STRIP: ABNORMAL
WBC # UR STRIP: ABNORMAL /HPF
WBC NRBC COR # BLD: 6.35 10*3/MM3 (ref 3.4–10.8)
WHOLE BLOOD HOLD SPECIMEN: NORMAL
WHOLE BLOOD HOLD SPECIMEN: NORMAL

## 2021-12-05 PROCEDURE — 87086 URINE CULTURE/COLONY COUNT: CPT | Performed by: EMERGENCY MEDICINE

## 2021-12-05 PROCEDURE — 80307 DRUG TEST PRSMV CHEM ANLYZR: CPT | Performed by: EMERGENCY MEDICINE

## 2021-12-05 PROCEDURE — 25010000002 AZITHROMYCIN PER 500 MG: Performed by: STUDENT IN AN ORGANIZED HEALTH CARE EDUCATION/TRAINING PROGRAM

## 2021-12-05 PROCEDURE — 87186 SC STD MICRODIL/AGAR DIL: CPT | Performed by: EMERGENCY MEDICINE

## 2021-12-05 PROCEDURE — 99285 EMERGENCY DEPT VISIT HI MDM: CPT

## 2021-12-05 PROCEDURE — 82962 GLUCOSE BLOOD TEST: CPT

## 2021-12-05 PROCEDURE — 71045 X-RAY EXAM CHEST 1 VIEW: CPT

## 2021-12-05 PROCEDURE — 25010000002 HALOPERIDOL LACTATE PER 5 MG: Performed by: EMERGENCY MEDICINE

## 2021-12-05 PROCEDURE — 87150 DNA/RNA AMPLIFIED PROBE: CPT | Performed by: STUDENT IN AN ORGANIZED HEALTH CARE EDUCATION/TRAINING PROGRAM

## 2021-12-05 PROCEDURE — 25010000002 CEFTRIAXONE PER 250 MG: Performed by: EMERGENCY MEDICINE

## 2021-12-05 PROCEDURE — 25010000002 HEPARIN (PORCINE) PER 1000 UNITS: Performed by: STUDENT IN AN ORGANIZED HEALTH CARE EDUCATION/TRAINING PROGRAM

## 2021-12-05 PROCEDURE — 70450 CT HEAD/BRAIN W/O DYE: CPT

## 2021-12-05 PROCEDURE — 83880 ASSAY OF NATRIURETIC PEPTIDE: CPT | Performed by: EMERGENCY MEDICINE

## 2021-12-05 PROCEDURE — 80143 DRUG ASSAY ACETAMINOPHEN: CPT | Performed by: EMERGENCY MEDICINE

## 2021-12-05 PROCEDURE — 99223 1ST HOSP IP/OBS HIGH 75: CPT | Performed by: STUDENT IN AN ORGANIZED HEALTH CARE EDUCATION/TRAINING PROGRAM

## 2021-12-05 PROCEDURE — 82140 ASSAY OF AMMONIA: CPT | Performed by: EMERGENCY MEDICINE

## 2021-12-05 PROCEDURE — 87040 BLOOD CULTURE FOR BACTERIA: CPT | Performed by: EMERGENCY MEDICINE

## 2021-12-05 PROCEDURE — 36415 COLL VENOUS BLD VENIPUNCTURE: CPT | Performed by: EMERGENCY MEDICINE

## 2021-12-05 PROCEDURE — 80053 COMPREHEN METABOLIC PANEL: CPT | Performed by: EMERGENCY MEDICINE

## 2021-12-05 PROCEDURE — P9612 CATHETERIZE FOR URINE SPEC: HCPCS

## 2021-12-05 PROCEDURE — 82550 ASSAY OF CK (CPK): CPT | Performed by: EMERGENCY MEDICINE

## 2021-12-05 PROCEDURE — 80179 DRUG ASSAY SALICYLATE: CPT | Performed by: EMERGENCY MEDICINE

## 2021-12-05 PROCEDURE — 63710000001 INSULIN DETEMIR PER 5 UNITS: Performed by: STUDENT IN AN ORGANIZED HEALTH CARE EDUCATION/TRAINING PROGRAM

## 2021-12-05 PROCEDURE — 81001 URINALYSIS AUTO W/SCOPE: CPT | Performed by: EMERGENCY MEDICINE

## 2021-12-05 PROCEDURE — 85025 COMPLETE CBC W/AUTO DIFF WBC: CPT | Performed by: EMERGENCY MEDICINE

## 2021-12-05 PROCEDURE — 87077 CULTURE AEROBIC IDENTIFY: CPT | Performed by: EMERGENCY MEDICINE

## 2021-12-05 PROCEDURE — 25010000002 MIDAZOLAM PER 1MG: Performed by: EMERGENCY MEDICINE

## 2021-12-05 PROCEDURE — 82077 ASSAY SPEC XCP UR&BREATH IA: CPT | Performed by: EMERGENCY MEDICINE

## 2021-12-05 PROCEDURE — 87635 SARS-COV-2 COVID-19 AMP PRB: CPT | Performed by: EMERGENCY MEDICINE

## 2021-12-05 RX ORDER — OMEPRAZOLE 20 MG/1
20 CAPSULE, DELAYED RELEASE ORAL DAILY
COMMUNITY
End: 2022-02-10 | Stop reason: SDUPTHER

## 2021-12-05 RX ORDER — KETOROLAC TROMETHAMINE 10 MG/1
10 TABLET, FILM COATED ORAL EVERY 6 HOURS PRN
COMMUNITY
End: 2021-12-11 | Stop reason: HOSPADM

## 2021-12-05 RX ORDER — AMLODIPINE BESYLATE 5 MG/1
10 TABLET ORAL DAILY
Status: DISCONTINUED | OUTPATIENT
Start: 2021-12-05 | End: 2021-12-09

## 2021-12-05 RX ORDER — CEFTRIAXONE SODIUM 1 G/50ML
1 INJECTION, SOLUTION INTRAVENOUS EVERY 24 HOURS
Status: DISCONTINUED | OUTPATIENT
Start: 2021-12-05 | End: 2021-12-11 | Stop reason: HOSPADM

## 2021-12-05 RX ORDER — HALOPERIDOL 5 MG/ML
5 INJECTION INTRAMUSCULAR ONCE
Status: COMPLETED | OUTPATIENT
Start: 2021-12-05 | End: 2021-12-05

## 2021-12-05 RX ORDER — SODIUM CHLORIDE 0.9 % (FLUSH) 0.9 %
10 SYRINGE (ML) INJECTION EVERY 12 HOURS SCHEDULED
Status: DISCONTINUED | OUTPATIENT
Start: 2021-12-05 | End: 2021-12-11 | Stop reason: HOSPADM

## 2021-12-05 RX ORDER — DEXTROSE MONOHYDRATE 100 MG/ML
25 INJECTION, SOLUTION INTRAVENOUS
Status: DISCONTINUED | OUTPATIENT
Start: 2021-12-05 | End: 2021-12-11 | Stop reason: HOSPADM

## 2021-12-05 RX ORDER — BACLOFEN 10 MG/1
10 TABLET ORAL 3 TIMES DAILY
COMMUNITY
End: 2022-02-10

## 2021-12-05 RX ORDER — SODIUM CHLORIDE 0.9 % (FLUSH) 0.9 %
10 SYRINGE (ML) INJECTION AS NEEDED
Status: DISCONTINUED | OUTPATIENT
Start: 2021-12-05 | End: 2021-12-11 | Stop reason: HOSPADM

## 2021-12-05 RX ORDER — NICOTINE POLACRILEX 4 MG
15 LOZENGE BUCCAL
Status: DISCONTINUED | OUTPATIENT
Start: 2021-12-05 | End: 2021-12-11 | Stop reason: HOSPADM

## 2021-12-05 RX ORDER — PANTOPRAZOLE SODIUM 40 MG/1
40 TABLET, DELAYED RELEASE ORAL EVERY MORNING
Status: DISCONTINUED | OUTPATIENT
Start: 2021-12-06 | End: 2021-12-07

## 2021-12-05 RX ORDER — PROPRANOLOL HYDROCHLORIDE 40 MG/1
60 TABLET ORAL 2 TIMES DAILY
Status: DISCONTINUED | OUTPATIENT
Start: 2021-12-05 | End: 2021-12-11

## 2021-12-05 RX ORDER — HEPARIN SODIUM 5000 [USP'U]/ML
5000 INJECTION, SOLUTION INTRAVENOUS; SUBCUTANEOUS EVERY 8 HOURS SCHEDULED
Status: DISCONTINUED | OUTPATIENT
Start: 2021-12-05 | End: 2021-12-08

## 2021-12-05 RX ORDER — LACTULOSE 10 G/15ML
20 SOLUTION ORAL DAILY
Status: DISCONTINUED | OUTPATIENT
Start: 2021-12-05 | End: 2021-12-05 | Stop reason: SDUPTHER

## 2021-12-05 RX ORDER — OXYCODONE AND ACETAMINOPHEN 10; 325 MG/1; MG/1
1 TABLET ORAL EVERY 8 HOURS PRN
COMMUNITY
End: 2021-12-14 | Stop reason: ALTCHOICE

## 2021-12-05 RX ORDER — MECLIZINE HYDROCHLORIDE 25 MG/1
25 TABLET ORAL 3 TIMES DAILY PRN
COMMUNITY
End: 2021-12-11 | Stop reason: HOSPADM

## 2021-12-05 RX ORDER — BUSPIRONE HYDROCHLORIDE 5 MG/1
5 TABLET ORAL 3 TIMES DAILY
Status: DISCONTINUED | OUTPATIENT
Start: 2021-12-05 | End: 2021-12-11 | Stop reason: HOSPADM

## 2021-12-05 RX ORDER — LACTULOSE 10 G/15ML
30 SOLUTION ORAL 3 TIMES DAILY
Status: DISCONTINUED | OUTPATIENT
Start: 2021-12-05 | End: 2021-12-08

## 2021-12-05 RX ORDER — NITROGLYCERIN 0.4 MG/1
0.4 TABLET SUBLINGUAL
Status: DISCONTINUED | OUTPATIENT
Start: 2021-12-05 | End: 2021-12-11 | Stop reason: HOSPADM

## 2021-12-05 RX ORDER — ALBUTEROL SULFATE 90 UG/1
2 AEROSOL, METERED RESPIRATORY (INHALATION) EVERY 4 HOURS PRN
Status: DISCONTINUED | OUTPATIENT
Start: 2021-12-05 | End: 2021-12-11 | Stop reason: HOSPADM

## 2021-12-05 RX ORDER — MIDAZOLAM HYDROCHLORIDE 2 MG/2ML
2 INJECTION, SOLUTION INTRAMUSCULAR; INTRAVENOUS ONCE
Status: COMPLETED | OUTPATIENT
Start: 2021-12-05 | End: 2021-12-05

## 2021-12-05 RX ORDER — CEFTRIAXONE SODIUM 1 G/50ML
1 INJECTION, SOLUTION INTRAVENOUS ONCE
Status: COMPLETED | OUTPATIENT
Start: 2021-12-05 | End: 2021-12-05

## 2021-12-05 RX ADMIN — LACTULOSE 30 G: 20 SOLUTION ORAL at 21:27

## 2021-12-05 RX ADMIN — INSULIN DETEMIR 30 UNITS: 100 INJECTION, SOLUTION SUBCUTANEOUS at 22:29

## 2021-12-05 RX ADMIN — CEFTRIAXONE SODIUM 1 G: 1 INJECTION, SOLUTION INTRAVENOUS at 18:17

## 2021-12-05 RX ADMIN — HALOPERIDOL LACTATE 5 MG: 5 INJECTION, SOLUTION INTRAMUSCULAR at 17:10

## 2021-12-05 RX ADMIN — SODIUM CHLORIDE 500 ML: 9 INJECTION, SOLUTION INTRAVENOUS at 15:48

## 2021-12-05 RX ADMIN — PROPRANOLOL HYDROCHLORIDE 60 MG: 40 TABLET ORAL at 21:28

## 2021-12-05 RX ADMIN — AZITHROMYCIN DIHYDRATE 500 MG: 500 INJECTION, POWDER, LYOPHILIZED, FOR SOLUTION INTRAVENOUS at 23:18

## 2021-12-05 RX ADMIN — AZITHROMYCIN DIHYDRATE 500 MG: 500 INJECTION, POWDER, LYOPHILIZED, FOR SOLUTION INTRAVENOUS at 19:30

## 2021-12-05 RX ADMIN — LACTULOSE 20 G: 20 SOLUTION ORAL at 15:36

## 2021-12-05 RX ADMIN — MIDAZOLAM HYDROCHLORIDE 2 MG: 1 INJECTION, SOLUTION INTRAMUSCULAR; INTRAVENOUS at 14:30

## 2021-12-05 RX ADMIN — HEPARIN SODIUM 5000 UNITS: 5000 INJECTION, SOLUTION INTRAVENOUS; SUBCUTANEOUS at 21:28

## 2021-12-05 RX ADMIN — BUSPIRONE HYDROCHLORIDE 5 MG: 5 TABLET ORAL at 21:28

## 2021-12-05 RX ADMIN — SODIUM CHLORIDE, PRESERVATIVE FREE 10 ML: 5 INJECTION INTRAVENOUS at 22:31

## 2021-12-05 NOTE — ED PROVIDER NOTES
"Time: 2:04 PM EST  Arrived by: ambulance  Chief Complaint: SI   History provided by: ED nurse   History is limited by: AMS     History of Present Illness:  Patient is a 56 y.o. year old male that presents to the emergency department with SI. Per ED nurse, it is reported that the pt told his neighbor that he wanted to \"blow his brains out.\" EMS was called due to SI. Pt was combative and agitated so EMS administered 5 mg of Versed.     Pt has hx of cirrhosis.       History provided by: ED nurse.  History limited by:  Mental status change      Recently seen: Pt was last seen in this ED on 11/18/21 for fall.       Patient Care Team  Primary Care Provider: Britney Echevarria APRN    Past Medical History:     No Known Allergies  Past Medical History:   Diagnosis Date   • Asthma    • Cirrhosis (HCC)    • Colon polyps    • Diabetes mellitus (HCC)    • Hypertension    • Liver disease    • Reflux esophagitis    • Renal disorder    • Sleep apnea      Past Surgical History:   Procedure Laterality Date   • COLONOSCOPY  2018, 2020   • ENDOSCOPY  2019   • FRACTURE SURGERY     • LEG SURGERY     • PELVIC FRACTURE SURGERY     • UPPER GASTROINTESTINAL ENDOSCOPY       Family History   Problem Relation Age of Onset   • Stomach cancer Sister    • Lung cancer Father    • Colon cancer Neg Hx        Home Medications:  Prior to Admission medications    Medication Sig Start Date End Date Taking? Authorizing Provider   amLODIPine (NORVASC) 10 MG tablet Take 1 tablet by mouth Daily. 11/2/21  Yes Britney Echevarria APRN   baclofen (LIORESAL) 10 MG tablet Take 10 mg by mouth 3 (Three) Times a Day.   Yes Provider, MD Joi   busPIRone (BUSPAR) 5 MG tablet Take 1 tablet by mouth 3 (Three) Times a Day. 11/2/21  Yes Britney Echevarria APRN   cyclobenzaprine (FLEXERIL) 10 MG tablet Take 1 tablet by mouth 3 (Three) Times a Day As Needed for Muscle Spasms. 11/2/21  Yes Britney Echevarria APRN   furosemide (LASIX) 40 MG tablet Take 2 " tablets by mouth 3 (Three) Times a Day for 30 days.  Patient taking differently: Take 40 mg by mouth 3 (Three) Times a Day. 11/10/21 12/10/21 Yes Britney Echevarria APRN   glimepiride (Amaryl) 1 MG tablet Take 1 tablet by mouth Every Morning Before Breakfast for 30 days. 11/2/21 12/5/21 Yes Britney Echevarria APRN   ketorolac (TORADOL) 10 MG tablet Take 10 mg by mouth Every 6 (Six) Hours As Needed for Moderate Pain .   Yes ProviderJoi MD   meclizine (ANTIVERT) 25 MG tablet Take 25 mg by mouth 3 (Three) Times a Day As Needed for Dizziness.   Yes Joi Gonzalez MD   Morphine (MSIR) 15 MG tablet Take 1 tablet by mouth Every 8 (Eight) Hours As Needed for Severe Pain . 12/3/21  Yes Jonh Franco Jr., MD   OMEPRAZOLE PO Take 20 mg by mouth Daily.   Yes Joi Gonzalez MD   oxyCODONE-acetaminophen (PERCOCET)  MG per tablet Take 1 tablet by mouth Every 8 (Eight) Hours As Needed for Moderate Pain . bottle empty   Yes ProviderJoi MD   sertraline (ZOLOFT) 50 MG tablet Take 50 mg by mouth Daily.   Yes ProviderJoi MD   spironolactone (ALDACTONE) 100 MG tablet Take 2 tablets by mouth Daily. 11/19/21  Yes Erin Chavez, MITRA   albuterol sulfate  (90 Base) MCG/ACT inhaler Inhale 2 puffs Every 4 (Four) Hours As Needed for Wheezing. 11/2/21   Britney Echevarria APRN   Alcohol Swabs (Alcohol Prep) pads 1 pad 5 (Five) Times a Day. 12/3/21   Jonh Franco Jr., MD   Glucose Blood (Blood Glucose Test) strip 1 strip by In Vitro route 5 (Five) Times a Day. 12/3/21   Jonh Franco Jr., MD   Insulin Glargine (LANTUS SOLOSTAR) 100 UNIT/ML injection pen Inject 66 Units under the skin into the appropriate area as directed Daily for 30 days. 12/3/21 1/2/22  Jonh Franco Jr., MD   lactulose (CHRONULAC) 10 GM/15ML solution Take 30 mL by mouth 2 (Two) Times a Day. 11/19/21   Erin Chavez, NP   Lancets (freestyle) lancets 1 each by Other route As  "Needed (glucose check). 12/3/21   Jonh Franco Jr., MD   ondansetron ODT (ZOFRAN-ODT) 4 MG disintegrating tablet Place 1 tablet under the tongue 4 (Four) Times a Day As Needed for Nausea or Vomiting. 11/24/21   Britney Echevarria APRN   Potassium 99 MG tablet Apply 99 mg to the mouth or throat 2 (Two) Times a Day. 11/2/21   Britney Echevarria APRN   propranolol (INDERAL) 20 MG tablet Take 3 tablets by mouth 2 (Two) Times a Day for 30 days.  Patient taking differently: Take 40 mg by mouth 2 (Two) Times a Day. 11/10/21 12/10/21  Britney Echevarria APRN        Social History:   Social History     Tobacco Use   • Smoking status: Never Smoker   • Smokeless tobacco: Never Used   Vaping Use   • Vaping Use: Never used   Substance Use Topics   • Alcohol use: Never   • Drug use: Never     Recent travel: not applicable     Review of Systems:  Review of Systems   Unable to perform ROS: Mental status change        Physical Exam:  /93   Pulse 87   Temp 97.7 °F (36.5 °C) (Oral)   Resp 15   Ht 188 cm (74\")   Wt (!) 140 kg (308 lb 13.8 oz)   SpO2 100%   BMI 39.66 kg/m²     Physical Exam  Vitals and nursing note reviewed.   Constitutional:       General: He is not in acute distress.     Appearance: Normal appearance.      Comments: Sedated/groaning.    HENT:      Head: Normocephalic and atraumatic.      Nose: Nose normal.   Eyes:      General: No scleral icterus.     Comments: Dilated bilaterally.    Cardiovascular:      Rate and Rhythm: Normal rate and regular rhythm.      Pulses:           Dorsalis pedis pulses are 2+ on the right side and 2+ on the left side.      Heart sounds: Normal heart sounds.   Pulmonary:      Effort: Pulmonary effort is normal. No respiratory distress.      Breath sounds: Normal breath sounds.   Abdominal:      Palpations: Abdomen is soft.      Tenderness: There is no abdominal tenderness.   Musculoskeletal:         General: Normal range of motion.      Cervical back: Neck " supple.      Right lower leg: Edema (moderate nonpitting ) present.      Left lower leg: Edema (moderate nonpitting ) present.      Comments: Chroncic discoloration of BLE, left greater than right. No evidence of vascular occlusion.    Skin:     General: Skin is warm and dry.   Neurological:      Comments: Responds to physical stimuli.               Medications in the Emergency Department:  Medications   sodium chloride 0.9 % flush 10 mL (has no administration in time range)   nitroglycerin (NITROSTAT) SL tablet 0.4 mg (has no administration in time range)   sodium chloride 0.9 % flush 10 mL (10 mL Intravenous Given 12/5/21 2231)   sodium chloride 0.9 % flush 10 mL (has no administration in time range)   heparin (porcine) 5000 UNIT/ML injection 5,000 Units (5,000 Units Subcutaneous Given 12/5/21 2128)   cefTRIAXone (ROCEPHIN) IVPB 1 g (has no administration in time range)   AZITHROMYCIN 500 MG/250 ML 0.9% NS IVPB (vial-mate) (500 mg Intravenous New Bag 12/5/21 2318)   albuterol sulfate HFA (PROVENTIL HFA;VENTOLIN HFA;PROAIR HFA) inhaler 2 puff (has no administration in time range)   amLODIPine (NORVASC) tablet 10 mg (has no administration in time range)   busPIRone (BUSPAR) tablet 5 mg (5 mg Oral Given 12/5/21 2128)   pantoprazole (PROTONIX) EC tablet 40 mg (has no administration in time range)   propranolol (INDERAL) tablet 60 mg (60 mg Oral Given 12/5/21 2128)   sertraline (ZOLOFT) tablet 50 mg (has no administration in time range)   dextrose (GLUTOSE) oral gel 15 g (has no administration in time range)   dextrose 10 % infusion (has no administration in time range)   glucagon (human recombinant) (GLUCAGEN DIAGNOSTIC) injection 1 mg (has no administration in time range)   insulin detemir (LEVEMIR) injection 30 Units (30 Units Subcutaneous Given 12/5/21 2229)   insulin lispro (humaLOG) injection 0-7 Units (has no administration in time range)   lactulose (CHRONULAC) 10 GM/15ML solution 30 g (30 g Oral Given 12/5/21  2127)   Midazolam HCl (PF) (VERSED) injection 2 mg (2 mg Intravenous Given 12/5/21 1430)   sodium chloride 0.9 % bolus 500 mL (0 mL Intravenous Stopped 12/5/21 1630)   haloperidol lactate (HALDOL) injection 5 mg (5 mg Intravenous Given 12/5/21 1710)   cefTRIAXone (ROCEPHIN) IVPB 1 g (1 g Intravenous New Bag 12/5/21 1817)   AZITHROMYCIN 500 MG/250 ML 0.9% NS IVPB (vial-mate) (500 mg Intravenous New Bag 12/5/21 1930)        Labs  Lab Results (last 24 hours)     Procedure Component Value Units Date/Time    CBC & Differential [[id number]]  (Abnormal) Collected: 12/05/21 1325    Specimen: Blood Updated: 12/05/21 1400    Narrative:      The following orders were created for panel order CBC & Differential.  Procedure                               Abnormality         Status                     ---------                               -----------         ------                     CBC Auto Differential[[id number]]        Abnormal            Final result                 Please view results for these tests on the individual orders.    Comprehensive Metabolic Panel [[id number]]  (Abnormal) Collected: 12/05/21 1325    Specimen: Blood Updated: 12/05/21 1441     Glucose 243 mg/dL      BUN 30 mg/dL      Creatinine 2.08 mg/dL      Sodium 137 mmol/L      Potassium 4.1 mmol/L      Chloride 102 mmol/L      CO2 18.7 mmol/L      Calcium 8.1 mg/dL      Total Protein 5.6 g/dL      Albumin 2.60 g/dL      ALT (SGPT) 11 U/L      AST (SGOT) 28 U/L      Alkaline Phosphatase 157 U/L      Total Bilirubin 2.2 mg/dL      eGFR Non African Amer 33 mL/min/1.73      Globulin 3.0 gm/dL      A/G Ratio 0.9 g/dL      BUN/Creatinine Ratio 14.4     Anion Gap 16.3 mmol/L     Narrative:      GFR Normal >60  Chronic Kidney Disease <60  Kidney Failure <15      Acetaminophen Level [[id number]]  (Normal) Collected: 12/05/21 1325    Specimen: Blood Updated: 12/05/21 1400     Acetaminophen <5.0 mcg/mL     Ethanol [[id number]] Collected: 12/05/21 1325    Specimen: Blood  Updated: 12/05/21 1400     Ethanol <10 mg/dL      Ethanol % <0.010 %     Narrative:      Ethanol (Plasma)  <10 Essentially Negative    Toxic Concentrations           mg/dL    Flushing, slowing of reflexes    Impaired visual activity         Depression of CNS              >100  Possible Coma                  >300       Salicylate Level [[id number]]  (Normal) Collected: 12/05/21 1325    Specimen: Blood Updated: 12/05/21 1400     Salicylate <0.3 mg/dL     Ammonia [[id number]]  (Abnormal) Collected: 12/05/21 1325    Specimen: Blood Updated: 12/05/21 1401     Ammonia 131 umol/L     CBC Auto Differential [[id number]]  (Abnormal) Collected: 12/05/21 1325    Specimen: Blood Updated: 12/05/21 1400     WBC 6.35 10*3/mm3      RBC 2.92 10*6/mm3      Hemoglobin 8.7 g/dL      Hematocrit 26.0 %      MCV 89.0 fL      MCH 29.8 pg      MCHC 33.5 g/dL      RDW 15.4 %      RDW-SD 50.4 fl      MPV 11.7 fL      Platelets 63 10*3/mm3      Neutrophil % 86.5 %      Lymphocyte % 6.0 %      Monocyte % 5.7 %      Eosinophil % 0.0 %      Basophil % 0.2 %      Immature Grans % 1.6 %      Neutrophils, Absolute 5.50 10*3/mm3      Lymphocytes, Absolute 0.38 10*3/mm3      Monocytes, Absolute 0.36 10*3/mm3      Eosinophils, Absolute 0.00 10*3/mm3      Basophils, Absolute 0.01 10*3/mm3      Immature Grans, Absolute 0.10 10*3/mm3      nRBC 0.0 /100 WBC     BNP [[id number]]  (Normal) Collected: 12/05/21 1325    Specimen: Blood Updated: 12/05/21 1444     proBNP 734.2 pg/mL     Narrative:      Among patients with dyspnea, NT-proBNP is highly sensitive for the detection of acute congestive heart failure. In addition NT-proBNP of <300 pg/ml effectively rules out acute congestive heart failure with 99% negative predictive value.    Results may be falsely decreased if patient taking Biotin.      CK [[id number]]  (Normal) Collected: 12/05/21 1325    Specimen: Blood Updated: 12/05/21 1447     Creatine Kinase 84 U/L     POC Glucose Once [[id number]]   (Abnormal) Collected: 12/05/21 1346    Specimen: Blood Updated: 12/05/21 1347     Glucose 222 mg/dL      Comment: Serial Number: [id number]:  [id number]       Urine Drug Screen - Urine, Catheter [[id number]]  (Abnormal) Collected: 12/05/21 1349    Specimen: Urine, Catheter Updated: 12/05/21 1442     Amphet/Methamphet, Screen Negative     Barbiturates Screen, Urine Negative     Benzodiazepine Screen, Urine Negative     Cocaine Screen, Urine Negative     Opiate Screen Positive     THC, Screen, Urine Negative     Methadone Screen, Urine Negative     Oxycodone Screen, Urine Negative    Narrative:      Negative Thresholds Per Drugs Screened:    Amphetamines                 500 ng/ml  Barbiturates                 200 ng/ml  Benzodiazepines              100 ng/ml  Cocaine                      300 ng/ml  Methadone                    300 ng/ml  Opiates                      300 ng/ml  Oxycodone                    100 ng/ml  THC                           50 ng/ml    The Normal Value for all drugs tested is negative. This report includes final unconfirmed screening results to be used for medical treatment purposes only. Unconfirmed results must not be used for non-medical purposes such as employment or legal testing. Clinical consideration should be applied to any drug of abuse test, particularly when unconfirmed results are used.            Urinalysis With Culture If Indicated - Urine, Clean Catch [[id number]]  (Abnormal) Collected: 12/05/21 1349    Specimen: Urine, Clean Catch Updated: 12/05/21 1837     Color, UA Dark Yellow     Appearance, UA Turbid     pH, UA 5.5     Specific Gravity, UA 1.017     Glucose, UA Negative     Ketones, UA Negative     Bilirubin, UA Small (1+)     Blood, UA Moderate (2+)     Protein,  mg/dL (2+)     Leuk Esterase, UA Large (3+)     Nitrite, UA Negative     Urobilinogen, UA 1.0 E.U./dL    Urinalysis, Microscopic Only - Urine, Clean Catch [[id number]]  (Abnormal) Collected: 12/05/21 1349     Specimen: Urine, Clean Catch Updated: 12/05/21 1837     RBC, UA None Seen /HPF      WBC, UA Too Numerous to Count /HPF      Bacteria, UA 4+ /HPF      Squamous Epithelial Cells, UA 3-6 /HPF      Hyaline Casts, UA None Seen /LPF      Methodology Manual Light Microscopy    Urine Culture - Urine, Urine, Clean Catch [[id number]] Collected: 12/05/21 1349    Specimen: Urine, Clean Catch Updated: 12/05/21 1837    COVID-19,CEPHEID/ANNE MARIE/BDMAX,COR/CATRACHITA/PAD/PAO IN-HOUSE(OR EMERGENT/ADD-ON),NP SWAB IN TRANSPORT MEDIA 3-4 HR TAT, RT-PCR - Swab, Nasopharynx [[id number]]  (Normal) Collected: 12/05/21 1358    Specimen: Swab from Nasopharynx Updated: 12/05/21 1734     COVID19 Not Detected    Narrative:      Fact sheet for providers: https://www.fda.gov/media/127273/download     Fact sheet for patients: https://www.fda.gov/media/274260/download  Presumptive Positive: additional testing may be indicated if it is necessary to differentiate between SARS-CoV-2 and other Sarbecovirus, for epidemiological purposes, or clinical management.    Blood Culture - Blood, Arm, Left [[id number]] Collected: 12/05/21 1814    Specimen: Blood from Arm, Left Updated: 12/05/21 1821    Blood Culture - Blood, Blood, Venous Line [[id number]] Collected: 12/05/21 1815    Specimen: Blood, Venous Line Updated: 12/05/21 1821    POC Glucose Once [[id number]]  (Abnormal) Collected: 12/05/21 2142    Specimen: Blood Updated: 12/05/21 2143     Glucose 175 mg/dL      Comment: Serial Number: [id number]:  [id number]              Imaging:  CT Head Without Contrast    Result Date: 12/5/2021  PROCEDURE: CT HEAD WO CONTRAST  COMPARISON:  [facility], CT, CT HEAD WO CONTRAST, 11/16/2021, 11:33. INDICATIONS: trauma  PROTOCOL:   Standard imaging protocol performed    RADIATION:   DLP: 1082.2 mGy*cm   MA and/or KV was adjusted to minimize radiation dose.     TECHNIQUE: After obtaining the patient's consent, CT images were obtained without non-ionic intravenous  contrast material.  FINDINGS:  There is mild diffuse generalized atrophy.  There is no mass or mass effect.  There are no abnormal extra-axial fluid collections.  There are no areas of acute hemorrhage.  There is minimal right maxillary sinus disease.  CONCLUSION: No acute intracranial abnormality     RACHELL DEMPSEY MD       Electronically Signed and Approved By: RACHELL DEMPSEY MD on 12/05/2021 at 14:52             XR Chest 1 View    Result Date: 12/5/2021  PROCEDURE: XR CHEST 1 VW  COMPARISON: [facility], , XR CHEST 1 VW, 12/05/2021, 15:08.  INDICATIONS: NG TUBE PALCEMENT.  FINDINGS: A single AP semi-upright portable view of the chest is provided for review.  Bilateral infiltrates are seen.  The findings may represent infectious multifocal pneumonia.  There may be borderline cardiac enlargement.  No pneumothorax.  No pneumomediastinum.  No pleural effusion.  There is slight pulmonary hypoinflation.  The distal nasogastric tube is projected below the diaphragm.  There is slight asymmetric elevation of the left diaphragm.  CONCLUSION: Bilateral infiltrates persist.  The findings may represent infectious multifocal pneumonia.  Interval placement of a nasogastric tube is noted.  Its distal portion is projected below the diaphragm.      DOTTY ROBLES JR, MD       Electronically Signed and Approved By: DOTTY ROBLES JR, MD on 12/05/2021 at 19:59             XR Chest 1 View    Result Date: 12/5/2021  PROCEDURE: XR CHEST 1 VW  COMPARISON: [facility], CLAUDIA, XR CHEST 1 VW, 11/18/2021, 11:24.  INDICATIONS: Rule out pneumonia, verify NG tube placement  FINDINGS:  There is no nasogastric tube imaged.  The heart size and pulmonary vessels are normal.  There is patchy airspace disease in the periphery of the left midlung and in throughout the right lung consistent with multifocal pneumonia.  The osseous structures are normal for age.  CONCLUSION: Mild diffuse right lung and left midlung alveolar airspace  disease consistent with pneumonia.       RACHELL DEMPSEY MD       Electronically Signed and Approved By: RACHELL DEMPSEY MD on 12/05/2021 at 16:05               Procedures:  Procedures    Progress  ED Course as of 12/05/21 2326   Sun Dec 05, 2021   1742 Case discussed with Dr. Campos for admit.  He request UA and to replace the NG tube the patient has recently dislodged. [RP]      ED Course User Index  [RP] Efrain Gipson MD                            Medical Decision Making:  MDM  Number of Diagnoses or Management Options  IVELISSE (acute kidney injury) (HCC): new and requires workup  Hepatic encephalopathy (HCC): established and worsening     Amount and/or Complexity of Data Reviewed  Clinical lab tests: reviewed  Tests in the radiology section of CPT®: reviewed  Decide to obtain previous medical records or to obtain history from someone other than the patient: yes    Risk of Complications, Morbidity, and/or Mortality  Presenting problems: moderate  Management options: low    Patient Progress  Patient progress: stable       Final diagnoses:   Hepatic encephalopathy (HCC)   IVELISSE (acute kidney injury) (HCC)        Disposition:  ED Disposition     ED Disposition Condition Comment    Decision to Admit  Level of Care: Telemetry [5]   Diagnosis: Hepatic encephalopathy (HCC) [572.2.ICD-9-CM]   Certification: I Certify That Inpatient Hospital Services Are Medically Necessary For Greater Than 2 Midnights          Documentation assistance provided by Flavia Cornelius acting as scribe for Efrain Gipson MD. Information recorded by the scribe was done at my direction and has been verified and validated by me.        Flavia Cornelius  12/05/21 1411       Efrain Gipson MD  12/05/21 2823

## 2021-12-05 NOTE — ED NOTES
NG tube inserted.  Medications administered.  NG tube removed by pt after med administration.  MD Informed.      Geoff Persaud, RN  12/05/21 6483

## 2021-12-05 NOTE — H&P
[facility]  [facility]   Patient Name: Nic Nicolas  : 1965  MRN: [id number]  Primary Care Physician:  Britney Echevarria APRN  Date of admission: 2021    Subjective   Subjective     Chief Complaint: Suicidal ideation and ultimately status    HPI:    Nic Nicolas is a 56 y.o. male with history of cirrhosis, diabetes, hypertension, sleep apnea and asthma who presented by EMS due to suicidal ideation.  This information was obtained from the ED physician and from chart review.  According to the ED physician patient presented by EMS because he told his neighbor that he wanted to blow his brains out and he was combative and agitated.  I was not able to get any information from the patient as he is combative and he spits on nurses and physicians.  In the ED they were able to put NG tube and give him lactulose but the patient was able to pull the NG tube after he was given the lactulose.  While in the ED his Covid came back negative but chest x-ray showed possible right lobe pneumonia.  In the ED he was given ceftriaxone azithromycin.  His labs showed an ammonia of 131.  His creatinine is up to 2 from a baseline of 0.68.  Patient was giving 500 IV bolus.  Patient had a recent paracentesis therapeutic on  in which 8.2 L was removed and it did not look infected.  He recently followed up with his PCP on the third of this month.    ED physician was not able to obtain a paracentesis for diagnostic purposes.      Personal History     Past Medical History:   Diagnosis Date   • Asthma    • Cirrhosis (HCC)    • Colon polyps    • Diabetes mellitus (HCC)    • Hypertension    • Liver disease    • Reflux esophagitis    • Renal disorder    • Sleep apnea          Past Surgical History:   Procedure Laterality Date   • COLONOSCOPY  ,    • ENDOSCOPY  2019   • FRACTURE SURGERY     • LEG SURGERY     • PELVIC FRACTURE SURGERY     • UPPER GASTROINTESTINAL  ENDOSCOPY           Family History   Problem Relation Age of Onset   • Stomach cancer Sister    • Lung cancer Father    • Colon cancer Neg Hx          Social History     Socioeconomic History   • Marital status:    Tobacco Use   • Smoking status: Never Smoker   • Smokeless tobacco: Never Used   Vaping Use   • Vaping Use: Never used   Substance and Sexual Activity   • Alcohol use: Never   • Drug use: Never   • Sexual activity: Defer         Home Medications:  Alcohol Prep, Blood Glucose Test, Insulin Glargine, Morphine, Potassium, albuterol sulfate HFA, amLODIPine, baclofen, busPIRone, cyclobenzaprine, freestyle, furosemide, glimepiride, ketorolac, lactulose, meclizine, omeprazole, ondansetron ODT, oxyCODONE-acetaminophen, propranolol, sertraline, and spironolactone    Allergies:  No Known Allergies    Review of Systems   Unable to perform ROS: Mental status change        Objective   Objective     Vitals:   Temp:  [97.7 °F (36.5 °C)] 97.7 °F (36.5 °C)  Heart Rate:  [] 84  Resp:  [15] 15  BP: (123-179)/() 123/102    Physical Exam    Constitutional: Combative    Eyes: Pupils equal, sclerae anicteric, no conjunctival injection   HENT: NCAT, mucous membranes moist   Neck: Supple, no thyromegaly, no lymphadenopathy, trachea midline   Respiratory: Clear to auscultation bilaterally, nonlabored respirations    Cardiovascular: RRR, no murmurs, rubs, or gallops, palpable pedal pulses bilaterally   Gastrointestinal: Positive bowel sounds, soft, nontender, nondistended   Musculoskeletal: No bilateral ankle edema, no clubbing or cyanosis to extremities   Psychiatric: Appropriate affect, cooperative   Neurologic: Patient is altered   Skin: No rashes     Result Review    Result Review:  I have personally reviewed the results from the time of this admission to 12/5/2021 18:03 EST and agree with these findings:  [x]  Laboratory  []  Microbiology  [x]  Radiology  []  EKG/Telemetry   []  Cardiology/Vascular   []   Pathology  []  Old records  []  Other:    Imaging Results (Last 24 Hours)     Procedure Component Value Units Date/Time    XR Chest 1 View [[id number]] Collected: 12/05/21 1605     Updated: 12/05/21 1608    Narrative:      PROCEDURE: XR CHEST 1 VW     COMPARISON: [facility], CR, XR CHEST 1 VW, 11/18/2021, 11:24.     INDICATIONS: Rule out pneumonia, verify NG tube placement     FINDINGS:   There is no nasogastric tube imaged.  The heart size and pulmonary vessels are normal.  There is   patchy airspace disease in the periphery of the left midlung and in throughout the right lung   consistent with multifocal pneumonia.  The osseous structures are normal for age.     CONCLUSION: Mild diffuse right lung and left midlung alveolar airspace disease consistent with   pneumonia.                  RACHELL DEMPSEY MD         Electronically Signed and Approved By: RACHELL DEMPSEY MD on 12/05/2021 at 16:05                     CT Head Without Contrast [[id number]] Collected: 12/05/21 1452     Updated: 12/05/21 1455    Narrative:      PROCEDURE: CT HEAD WO CONTRAST     COMPARISON:  Psychiatric, CT, CT HEAD WO CONTRAST, 11/16/2021, 11:33.  INDICATIONS: trauma     PROTOCOL:   Standard imaging protocol performed      RADIATION:   DLP: 1082.2 mGy*cm    MA and/or KV was adjusted to minimize radiation dose.          TECHNIQUE: After obtaining the patient's consent, CT images were obtained without non-ionic   intravenous contrast material.      FINDINGS:   There is mild diffuse generalized atrophy.  There is no mass or mass effect.  There are no abnormal   extra-axial fluid collections.  There are no areas of acute hemorrhage.  There is minimal right   maxillary sinus disease.     CONCLUSION: No acute intracranial abnormality            RACHELL DEMPSEY MD         Electronically Signed and Approved By: RACHELL DEMPSEY MD on 12/05/2021 at 14:52                            Assessment/Plan   Assessment / Plan      Assessment  #Hepatic cephalopathy  #Acute kidney injury  #Pneumonia  #History of diabetes  #History of asthma  #History of depression  #History of hyperlipidemia  #Thrombocytopenia  #Chronic pain        Plan:   -Admit to telemetry bed  -Lactulose every 6 hours  -Continue azithromycin and ceftriaxone  -Blood culture  -Urinalysis  -GI consult  -Nephrology consult  -Urine sodium  -Urine potassium  -Urine chloride  -Urine creatinine  -Abdominal ultrasound  -Hold Lasix and Aldactone  -Sputum culture  -Insulin  -One-on-one sitter  -Suicidal precaution  -Placement of an NG tube  -Upper extremity restraint  -Consult radiology for paracentesis both diagnostic and therapeutic          DVT prophylaxis:  No DVT prophylaxis order currently exists.    CODE STATUS:         Admission Status:  I believe this patient meets inpatient status.    Patrice Campos MD

## 2021-12-06 ENCOUNTER — APPOINTMENT (OUTPATIENT)
Dept: ULTRASOUND IMAGING | Facility: HOSPITAL | Age: 56
End: 2021-12-06

## 2021-12-06 ENCOUNTER — APPOINTMENT (OUTPATIENT)
Dept: INTERVENTIONAL RADIOLOGY/VASCULAR | Facility: HOSPITAL | Age: 56
End: 2021-12-06

## 2021-12-06 LAB
ALBUMIN SERPL-MCNC: 2.5 G/DL (ref 3.5–5.2)
ALBUMIN/GLOB SERPL: 0.8 G/DL
ALP SERPL-CCNC: 141 U/L (ref 39–117)
ALT SERPL W P-5'-P-CCNC: 11 U/L (ref 1–41)
ANION GAP SERPL CALCULATED.3IONS-SCNC: 11.2 MMOL/L (ref 5–15)
AST SERPL-CCNC: 33 U/L (ref 1–40)
BASOPHILS # BLD AUTO: 0.04 10*3/MM3 (ref 0–0.2)
BASOPHILS NFR BLD AUTO: 0.4 % (ref 0–1.5)
BILIRUB SERPL-MCNC: 2.3 MG/DL (ref 0–1.2)
BUN SERPL-MCNC: 27 MG/DL (ref 6–20)
BUN/CREAT SERPL: 17.4 (ref 7–25)
CALCIUM SPEC-SCNC: 8.5 MG/DL (ref 8.6–10.5)
CHLORIDE SERPL-SCNC: 105 MMOL/L (ref 98–107)
CHLORIDE UR-SCNC: 41 MMOL/L
CO2 SERPL-SCNC: 21.8 MMOL/L (ref 22–29)
CREAT SERPL-MCNC: 1.55 MG/DL (ref 0.76–1.27)
CREAT UR-MCNC: 90.1 MG/DL
DEPRECATED RDW RBC AUTO: 46.8 FL (ref 37–54)
EOSINOPHIL # BLD AUTO: 0.05 10*3/MM3 (ref 0–0.4)
EOSINOPHIL NFR BLD AUTO: 0.5 % (ref 0.3–6.2)
ERYTHROCYTE [DISTWIDTH] IN BLOOD BY AUTOMATED COUNT: 15.4 % (ref 12.3–15.4)
GFR SERPL CREATININE-BSD FRML MDRD: 47 ML/MIN/1.73
GLOBULIN UR ELPH-MCNC: 3.2 GM/DL
GLUCOSE BLDC GLUCOMTR-MCNC: 102 MG/DL (ref 70–99)
GLUCOSE BLDC GLUCOMTR-MCNC: 83 MG/DL (ref 70–99)
GLUCOSE BLDC GLUCOMTR-MCNC: 93 MG/DL (ref 70–99)
GLUCOSE SERPL-MCNC: 103 MG/DL (ref 65–99)
HCT VFR BLD AUTO: 25.7 % (ref 37.5–51)
HGB BLD-MCNC: 9.1 G/DL (ref 13–17.7)
IMM GRANULOCYTES # BLD AUTO: 0.13 10*3/MM3 (ref 0–0.05)
IMM GRANULOCYTES NFR BLD AUTO: 1.4 % (ref 0–0.5)
LYMPHOCYTES # BLD AUTO: 1.07 10*3/MM3 (ref 0.7–3.1)
LYMPHOCYTES NFR BLD AUTO: 11.3 % (ref 19.6–45.3)
MAGNESIUM SERPL-MCNC: 1.7 MG/DL (ref 1.6–2.6)
MCH RBC QN AUTO: 29.4 PG (ref 26.6–33)
MCHC RBC AUTO-ENTMCNC: 35.4 G/DL (ref 31.5–35.7)
MCV RBC AUTO: 82.9 FL (ref 79–97)
MONOCYTES # BLD AUTO: 0.81 10*3/MM3 (ref 0.1–0.9)
MONOCYTES NFR BLD AUTO: 8.6 % (ref 5–12)
NEUTROPHILS NFR BLD AUTO: 7.35 10*3/MM3 (ref 1.7–7)
NEUTROPHILS NFR BLD AUTO: 77.8 % (ref 42.7–76)
NRBC BLD AUTO-RTO: 0 /100 WBC (ref 0–0.2)
PHOSPHATE SERPL-MCNC: 3.5 MG/DL (ref 2.5–4.5)
PLATELET # BLD AUTO: 100 10*3/MM3 (ref 140–450)
PMV BLD AUTO: 10.2 FL (ref 6–12)
POTASSIUM SERPL-SCNC: 3.8 MMOL/L (ref 3.5–5.2)
POTASSIUM UR-SCNC: 52.6 MMOL/L
PROCALCITONIN SERPL-MCNC: 1.71 NG/ML (ref 0–0.25)
PROT SERPL-MCNC: 5.7 G/DL (ref 6–8.5)
RBC # BLD AUTO: 3.1 10*6/MM3 (ref 4.14–5.8)
SODIUM SERPL-SCNC: 138 MMOL/L (ref 136–145)
SODIUM UR-SCNC: 66 MMOL/L
TSH SERPL DL<=0.05 MIU/L-ACNC: 6.09 UIU/ML (ref 0.27–4.2)
WBC NRBC COR # BLD: 9.45 10*3/MM3 (ref 3.4–10.8)

## 2021-12-06 PROCEDURE — 85025 COMPLETE CBC W/AUTO DIFF WBC: CPT | Performed by: STUDENT IN AN ORGANIZED HEALTH CARE EDUCATION/TRAINING PROGRAM

## 2021-12-06 PROCEDURE — 25010000002 MORPHINE PER 10 MG: Performed by: GENERAL PRACTICE

## 2021-12-06 PROCEDURE — 84145 PROCALCITONIN (PCT): CPT | Performed by: STUDENT IN AN ORGANIZED HEALTH CARE EDUCATION/TRAINING PROGRAM

## 2021-12-06 PROCEDURE — 84100 ASSAY OF PHOSPHORUS: CPT | Performed by: STUDENT IN AN ORGANIZED HEALTH CARE EDUCATION/TRAINING PROGRAM

## 2021-12-06 PROCEDURE — 25010000002 ALBUMIN HUMAN 25% PER 50 ML: Performed by: NURSE PRACTITIONER

## 2021-12-06 PROCEDURE — 82962 GLUCOSE BLOOD TEST: CPT

## 2021-12-06 PROCEDURE — 99254 IP/OBS CNSLTJ NEW/EST MOD 60: CPT | Performed by: INTERNAL MEDICINE

## 2021-12-06 PROCEDURE — 0W9G3ZZ DRAINAGE OF PERITONEAL CAVITY, PERCUTANEOUS APPROACH: ICD-10-PCS | Performed by: INTERNAL MEDICINE

## 2021-12-06 PROCEDURE — 25010000002 CEFTRIAXONE PER 250 MG: Performed by: STUDENT IN AN ORGANIZED HEALTH CARE EDUCATION/TRAINING PROGRAM

## 2021-12-06 PROCEDURE — 84300 ASSAY OF URINE SODIUM: CPT | Performed by: STUDENT IN AN ORGANIZED HEALTH CARE EDUCATION/TRAINING PROGRAM

## 2021-12-06 PROCEDURE — 80053 COMPREHEN METABOLIC PANEL: CPT | Performed by: STUDENT IN AN ORGANIZED HEALTH CARE EDUCATION/TRAINING PROGRAM

## 2021-12-06 PROCEDURE — 0 PHYTONADIONE 10 MG/ML SOLUTION 1 ML AMPULE: Performed by: INTERNAL MEDICINE

## 2021-12-06 PROCEDURE — 63710000001 INSULIN DETEMIR PER 5 UNITS: Performed by: INTERNAL MEDICINE

## 2021-12-06 PROCEDURE — 84443 ASSAY THYROID STIM HORMONE: CPT | Performed by: STUDENT IN AN ORGANIZED HEALTH CARE EDUCATION/TRAINING PROGRAM

## 2021-12-06 PROCEDURE — 25010000002 HEPARIN (PORCINE) PER 1000 UNITS: Performed by: STUDENT IN AN ORGANIZED HEALTH CARE EDUCATION/TRAINING PROGRAM

## 2021-12-06 PROCEDURE — 82436 ASSAY OF URINE CHLORIDE: CPT | Performed by: STUDENT IN AN ORGANIZED HEALTH CARE EDUCATION/TRAINING PROGRAM

## 2021-12-06 PROCEDURE — 99233 SBSQ HOSP IP/OBS HIGH 50: CPT | Performed by: INTERNAL MEDICINE

## 2021-12-06 PROCEDURE — 84133 ASSAY OF URINE POTASSIUM: CPT | Performed by: STUDENT IN AN ORGANIZED HEALTH CARE EDUCATION/TRAINING PROGRAM

## 2021-12-06 PROCEDURE — 82570 ASSAY OF URINE CREATININE: CPT | Performed by: STUDENT IN AN ORGANIZED HEALTH CARE EDUCATION/TRAINING PROGRAM

## 2021-12-06 PROCEDURE — P9047 ALBUMIN (HUMAN), 25%, 50ML: HCPCS | Performed by: NURSE PRACTITIONER

## 2021-12-06 PROCEDURE — 25010000002 AZITHROMYCIN PER 500 MG: Performed by: STUDENT IN AN ORGANIZED HEALTH CARE EDUCATION/TRAINING PROGRAM

## 2021-12-06 PROCEDURE — 83735 ASSAY OF MAGNESIUM: CPT | Performed by: STUDENT IN AN ORGANIZED HEALTH CARE EDUCATION/TRAINING PROGRAM

## 2021-12-06 RX ORDER — ALBUMIN (HUMAN) 12.5 G/50ML
12.5 SOLUTION INTRAVENOUS ONCE
Status: COMPLETED | OUTPATIENT
Start: 2021-12-06 | End: 2021-12-06

## 2021-12-06 RX ORDER — LIDOCAINE HYDROCHLORIDE 20 MG/ML
INJECTION, SOLUTION INFILTRATION; PERINEURAL
Status: COMPLETED
Start: 2021-12-06 | End: 2021-12-06

## 2021-12-06 RX ORDER — MORPHINE SULFATE 2 MG/ML
1 INJECTION, SOLUTION INTRAMUSCULAR; INTRAVENOUS ONCE
Status: COMPLETED | OUTPATIENT
Start: 2021-12-06 | End: 2021-12-06

## 2021-12-06 RX ADMIN — CEFTRIAXONE SODIUM 1 G: 1 INJECTION, SOLUTION INTRAVENOUS at 17:42

## 2021-12-06 RX ADMIN — HEPARIN SODIUM 5000 UNITS: 5000 INJECTION, SOLUTION INTRAVENOUS; SUBCUTANEOUS at 08:01

## 2021-12-06 RX ADMIN — LIDOCAINE HYDROCHLORIDE 10 ML: 20 INJECTION, SOLUTION INFILTRATION; PERINEURAL at 12:50

## 2021-12-06 RX ADMIN — BUSPIRONE HYDROCHLORIDE 5 MG: 5 TABLET ORAL at 21:15

## 2021-12-06 RX ADMIN — BUSPIRONE HYDROCHLORIDE 5 MG: 5 TABLET ORAL at 10:33

## 2021-12-06 RX ADMIN — AMLODIPINE BESYLATE 10 MG: 5 TABLET ORAL at 10:33

## 2021-12-06 RX ADMIN — HEPARIN SODIUM 5000 UNITS: 5000 INJECTION, SOLUTION INTRAVENOUS; SUBCUTANEOUS at 21:14

## 2021-12-06 RX ADMIN — LACTULOSE 30 G: 20 SOLUTION ORAL at 21:15

## 2021-12-06 RX ADMIN — HEPARIN SODIUM 5000 UNITS: 5000 INJECTION, SOLUTION INTRAVENOUS; SUBCUTANEOUS at 14:04

## 2021-12-06 RX ADMIN — PANTOPRAZOLE SODIUM 40 MG: 40 TABLET, DELAYED RELEASE ORAL at 08:01

## 2021-12-06 RX ADMIN — PROPRANOLOL HYDROCHLORIDE 60 MG: 40 TABLET ORAL at 21:15

## 2021-12-06 RX ADMIN — LACTULOSE 30 G: 20 SOLUTION ORAL at 10:33

## 2021-12-06 RX ADMIN — LACTULOSE 30 G: 20 SOLUTION ORAL at 15:32

## 2021-12-06 RX ADMIN — PHYTONADIONE 10 MG: 10 INJECTION, EMULSION INTRAMUSCULAR; INTRAVENOUS; SUBCUTANEOUS at 20:19

## 2021-12-06 RX ADMIN — PROPRANOLOL HYDROCHLORIDE 60 MG: 40 TABLET ORAL at 10:33

## 2021-12-06 RX ADMIN — INSULIN DETEMIR 10 UNITS: 100 INJECTION, SOLUTION SUBCUTANEOUS at 21:14

## 2021-12-06 RX ADMIN — ALBUMIN HUMAN 12.5 G: 0.25 SOLUTION INTRAVENOUS at 05:19

## 2021-12-06 RX ADMIN — AZITHROMYCIN DIHYDRATE 500 MG: 500 INJECTION, POWDER, LYOPHILIZED, FOR SOLUTION INTRAVENOUS at 10:33

## 2021-12-06 RX ADMIN — SODIUM CHLORIDE, PRESERVATIVE FREE 10 ML: 5 INJECTION INTRAVENOUS at 10:34

## 2021-12-06 RX ADMIN — RIFAXIMIN 550 MG: 550 TABLET ORAL at 21:15

## 2021-12-06 RX ADMIN — BUSPIRONE HYDROCHLORIDE 5 MG: 5 TABLET ORAL at 15:32

## 2021-12-06 RX ADMIN — CEFTRIAXONE SODIUM 1 G: 1 INJECTION, SOLUTION INTRAVENOUS at 00:43

## 2021-12-06 RX ADMIN — MORPHINE SULFATE 1 MG: 2 INJECTION, SOLUTION INTRAMUSCULAR; INTRAVENOUS at 20:56

## 2021-12-06 RX ADMIN — SERTRALINE HYDROCHLORIDE 50 MG: 50 TABLET ORAL at 10:33

## 2021-12-06 NOTE — PROGRESS NOTES
" [facility]   Hospitalist Progress Note  Date: 2021  Patient Name: Nic Nicolas  : 1965  MRN: [id number]  Date of admission: 2021      Subjective   Subjective     Chief Complaint: SI and encphephalopathy    Summary: 56-year-old male with cirrhosis and other medical problems presented to the ED after reportedly telling his neighbor that he wanted to \"blow his brains out.\"  Per the admitting hospitalist, he was not able to get additional history as the patient was combative and spitting on nurses and physicians.  Ammonia was elevated to 131.  He was given an NG tube for lactulose but then pulled it out.  He was started on Rocephin and Zithromax for right lower lobe pneumonia.  He had worsening of his renal failure and nephrology was consulted.  He was admitted to the medicine service.    Interval Followup: agitated, cannot cooperate with conversation; in tats and face andi as he has been spitting at staff.  Unable to get paracentesis as it was too risky with his agitation.    Review of Systems  Cannot obtain ROS due to AMS.    Objective   Objective     Vitals:   Temp:  [97.7 °F (36.5 °C)-98.8 °F (37.1 °C)] 98.6 °F (37 °C)  Heart Rate:  [] 98  Resp:  [15-24] 20  BP: ()/() 178/91  Physical Exam    Constitutional: delirious, agitated, moving around in bed   Eyes: atraumatic; pupils appear equal   HENT: NCAT, mucous membranes moist   Neck: Supple, no lad   Respiratory: no resp distress; no stridor   Cardiovascular: no m, reg rhythm   Gastrointestinal: Positive bowel sounds, soft, nontender, nondistended   Musculoskeletal: No bilateral ankle edema, no clubbing or cyanosis to extremities   Psychiatric: cannot fully assess due to AMS   Neurologic: cannot full assess; moves all 4 ext   Skin: No rashes     Result Review    Result Review:  I have personally reviewed the results from the time of this admission to 2021 09:52 EST and agree with these findings:  [x]  Laboratory  [x]  " Microbiology  [x]  Radiology  []  EKG/Telemetry   []  Cardiology/Vascular   []  Pathology  []  Old records  []  Other:    Assessment/Plan   Assessment / Plan     Assessment:  Acute hepatic encephalopathy  Suicidal comments  IVELISSE  Right lower lobe pneumonia  Hepatic cirrhosis  Diabetes  Coagulopathy  Thrombocytopenia    Plan:  Admitted to Medicine  Creatinine better after fluids.  Nephrology consulted.  Continue lactulose thru NGT  IR for paracentesis --> unable to get done due to agitation  GI has been consulted  Will consult psychiatry given suicidal comments when more appropriate, though sounds like he was encephalopathic when he made them.   Monitor platelets and hold pharmacological DVT ppx if drop below 100       Discussed plan with RN.    DVT prophylaxis:  Medical DVT prophylaxis orders are present.    CODE STATUS:        Electronically signed by Dominick Davila MD, 12/06/21, 3:00 PM EST.

## 2021-12-06 NOTE — CONSULTS
"    [facility] Consult Note      Patient Name: Nic Nicolas  : 1965  MRN: [id number]  Primary Care Physician:  Britney Echevarria APRN  Referring Physician: No ref. provider found  Date of admission: 2021    Subjective     Reason for Consult:  IVELISSE     HPI:   Nic Nicolas is a 56 y.o. male with past medical history of asthma, diabetes mellitus type 2, hypertension, gastroesophageal flux disease, obstructive sleep apnea, liver disease from cirrhosis due to probable nonalcoholic fatty liver disease ( previous images studies showing \" 2021 findings consistent with cirrhosis with portal venous hypertension splenomegaly ) with ascites requiring paracentesis (paracentesis 2021 removal of 6 L ) .colonoscopy by Dr. Roblero 2020 revealed grade 2 external hemorrhoids with band ligation and a tubular adenoma removed from the colon,  last EGD by Dr. Roblero 2019 revealed grade 1 esophagitis and gastritis with no evidence of varices.     Patient presents to the emergency department for presumed suicidal ideation, in the emergency department found combative and confused to require restraints after ammonia was found to be on 131 , and not allowing NG tube placement, accompanied with acute kidney injury    Nephrology consultation been requested for the management    No significant information was able to be obtained from patient and he was  confused and agitated,  moaning in bed on restraints accompanied by nursing staff.     Review of Systems:   14 point review of systems is otherwise negative except for mentioned above on HPI    Personal History     Past Medical History:   Diagnosis Date   • Asthma    • Cirrhosis (HCC)    • Colon polyps    • Diabetes mellitus (HCC)    • Hypertension    • Liver disease    • Reflux esophagitis    • Renal disorder    • Sleep apnea        Past Surgical History:   Procedure Laterality Date   • COLONOSCOPY  2020   • ENDOSCOPY  " 2019   • FRACTURE SURGERY     • LEG SURGERY     • PELVIC FRACTURE SURGERY     • UPPER GASTROINTESTINAL ENDOSCOPY         Family History: family history includes Lung cancer in his father; Stomach cancer in his sister.    Social History:  reports that he has never smoked. He has never used smokeless tobacco. He reports that he does not drink alcohol and does not use drugs.    Home Medications:  Prior to Admission medications    Medication Sig Start Date End Date Taking? Authorizing Provider   cyclobenzaprine (FLEXERIL) 10 MG tablet Take 1 tablet by mouth 3 (Three) Times a Day As Needed for Muscle Spasms. 11/2/21  Yes Britney Echevarria APRN   albuterol sulfate  (90 Base) MCG/ACT inhaler Inhale 2 puffs Every 4 (Four) Hours As Needed for Wheezing. 11/2/21   Britney Echevarria APRN   Alcohol Swabs (Alcohol Prep) pads 1 pad 5 (Five) Times a Day. 12/3/21   Jonh Franco Jr., MD   amLODIPine (NORVASC) 10 MG tablet Take 1 tablet by mouth Daily. 11/2/21   Britney Echevarria APRN   baclofen (LIORESAL) 10 MG tablet Take 10 mg by mouth 3 (Three) Times a Day.    Provider, MD Joi   busPIRone (BUSPAR) 5 MG tablet Take 1 tablet by mouth 3 (Three) Times a Day. 11/2/21   Britney Echevarria APRN   furosemide (LASIX) 40 MG tablet Take 2 tablets by mouth 3 (Three) Times a Day for 30 days.  Patient taking differently: Take 40 mg by mouth 3 (Three) Times a Day. 11/10/21 12/10/21  Britney Echevarria APRN   glimepiride (Amaryl) 1 MG tablet Take 1 tablet by mouth Every Morning Before Breakfast for 30 days. 11/2/21 12/5/21  Britney Echevarria APRN   Glucose Blood (Blood Glucose Test) strip 1 strip by In Vitro route 5 (Five) Times a Day. 12/3/21   Jonh Franco Jr., MD   Insulin Glargine (LANTUS SOLOSTAR) 100 UNIT/ML injection pen Inject 66 Units under the skin into the appropriate area as directed Daily for 30 days. 12/3/21 1/2/22  Jonh Franco Jr., MD   ketorolac (TORADOL) 10 MG  tablet Take 10 mg by mouth Every 6 (Six) Hours As Needed for Moderate Pain .    ProviderJoi MD   lactulose (CHRONULAC) 10 GM/15ML solution Take 30 mL by mouth 2 (Two) Times a Day. 11/19/21   Erin Chavez NP   Lancets (freestyle) lancets 1 each by Other route As Needed (glucose check). 12/3/21   Jonh Franco Jr., MD   meclizine (ANTIVERT) 25 MG tablet Take 25 mg by mouth 3 (Three) Times a Day As Needed for Dizziness.    ProviderJoi MD   Morphine (MSIR) 15 MG tablet Take 1 tablet by mouth Every 8 (Eight) Hours As Needed for Severe Pain . 12/3/21   Jonh Franco Jr., MD   omeprazole (priLOSEC) 20 MG capsule Take 20 mg by mouth Daily.    ProviderJoi MD   ondansetron ODT (ZOFRAN-ODT) 4 MG disintegrating tablet Place 1 tablet under the tongue 4 (Four) Times a Day As Needed for Nausea or Vomiting. 11/24/21   Britney Echevarria APRN   oxyCODONE-acetaminophen (PERCOCET)  MG per tablet Take 1 tablet by mouth Every 8 (Eight) Hours As Needed for Moderate Pain . bottle empty    Joi Gonzalez MD   Potassium 99 MG tablet Apply 99 mg to the mouth or throat 2 (Two) Times a Day. 11/2/21   Britney Echevarria APRN   propranolol (INDERAL) 20 MG tablet Take 3 tablets by mouth 2 (Two) Times a Day for 30 days.  Patient taking differently: Take 40 mg by mouth 2 (Two) Times a Day. 11/10/21 12/10/21  Britney Echevarria APRN   sertraline (ZOLOFT) 50 MG tablet Take 50 mg by mouth Daily.    Joi Gonzalez MD   spironolactone (ALDACTONE) 100 MG tablet Take 2 tablets by mouth Daily. 11/19/21   Erin Chavez NP       Allergies:  No Known Allergies    Objective     Vitals:   Temp:  [98.1 °F (36.7 °C)-98.8 °F (37.1 °C)] 98.1 °F (36.7 °C)  Heart Rate:  [] 78  Resp:  [16-24] 20  BP: ()/(47-94) 166/73    Intake/Output Summary (Last 24 hours) at 12/6/2021 1711  Last data filed at 12/6/2021 0954  Gross per 24 hour   Intake --   Output 1280 ml   Net -1280 ml        Physical Exam:   Constitutional: Confused agitated on restraints  HEENT: Sclera anicteric, no conjunctival injection  Neck: Supple, no thyromegaly, no lymphadenopathy, trachea at midline, no JVD. NG tube in place  Respiratory: Diffuse crackles  Cardiovascular: RRR,  Gastrointestinal: Positive bowel sounds, abdomen is soft, nontender and nondistended  : No palpable bladder  Musculoskeletal: No edema, no clubbing or cyanosis  Psychiatric: Appropriate affect, cooperative  Neurologic: Confused and agitated on restraints no other focalization  Skin: Warm and dry       Scheduled Meds:     amLODIPine, 10 mg, Oral, Daily  azithromycin, 500 mg, Intravenous, Daily  busPIRone, 5 mg, Oral, TID  cefTRIAXone, 1 g, Intravenous, Q24H  heparin (porcine), 5,000 Units, Subcutaneous, Q8H  insulin detemir, 10 Units, Subcutaneous, Nightly  insulin lispro, 0-7 Units, Subcutaneous, TID AC  lactulose, 30 g, Oral, TID  pantoprazole, 40 mg, Oral, QAM  propranolol, 60 mg, Oral, BID  sodium chloride, 10 mL, Intravenous, Q12H      IV Meds:        Results Reviewed:   I have personally reviewed the results from the time of this admission to 12/6/2021 17:11 EST     Lab Results   Component Value Date    GLUCOSE 103 (H) 12/06/2021    CALCIUM 8.5 (L) 12/06/2021     12/06/2021    K 3.8 12/06/2021    CO2 21.8 (L) 12/06/2021     12/06/2021    BUN 27 (H) 12/06/2021    CREATININE 1.55 (H) 12/06/2021    EGFRIFNONA 47 (L) 12/06/2021    BCR 17.4 12/06/2021    ANIONGAP 11.2 12/06/2021      Lab Results   Component Value Date    MG 1.7 12/06/2021    PHOS 3.5 12/06/2021    ALBUMIN 2.50 (L) 12/06/2021         Images     XR CHEST 1 VW     COMPARISON: [facility], CR, XR CHEST 1 VW, 12/05/2021, 15:08.     INDICATIONS:  NG TUBE PALCEMENT.     FINDINGS:        A single AP semi-upright portable view of the chest is provided for review.  Bilateral   infiltrates are seen.  The findings may represent infectious multifocal pneumonia.  There  "may be   borderline cardiac enlargement.  No pneumothorax.  No pneumomediastinum.  No pleural effusion.    There is slight pulmonary hypoinflation.  The distal nasogastric tube is projected below the   diaphragm.  There is slight asymmetric elevation of the left diaphragm.     CONCLUSION: Bilateral infiltrates persist.  The findings may represent infectious multifocal   pneumonia.  Interval placement of a nasogastric tube is noted.  Its distal portion is projected   below the diaphragm.           Assessment / Plan     ASSESSMENT:    -Acute kidney injury likely secondary to prerenal state /volume depletion, secondary to confusion decreased oral intake , while on amlodipine, propanolol  Furosemide and spironolactone. Baseline creatinine 0.6 upon admission to currently 1.5 , after fluid resuscitation and albumin. Urinalysis minimal proteinuria with pyuria with 4+ bacteria .urine culture. In process. Ct abdomen  ( 8/21 ) \" Kidneys are normal bilaterally. There is no hydronephrosis.. I agree to continue medical management  -Acute hepatic encephalopathy secondary to hyper ammonia currently on lactulose following ammonia levels trend. As per primary team  -Suicidal ideation. As per primary team . Appreciate psychiatry management  -Hypertension.  Continue  combination of       . We will continue to follow closely. Heart healthy diet  -Diabetes Mellitus type 2 w/ complications ( retinopathy , peripheral vascular disease , gastroparesis , neuropathy , nephropathy ) .Continue insulin regimen / hypoglycemic regimen .Hypoglycemic protocol . POC glucose 4 x day .Diabetic diet  -Liver disease from cirrhosis due to probable nonalcoholic fatty liver disease ( previous images studies showing \" 8/13/2021 findings consistent with cirrhosis with portal venous hypertension splenomegaly ) with ascites requiring paracentesis (paracentesis 11/18/2021 removal of 6 L ) . As per gastroenterology recommendations .  -Multifocal pneumonia on " azithromycin and ceftriaxone      PLAN:  -Agree with fluid resuscitation with holding diuretics and adjusting antihypertensive  -Follow renal function closely  -Avoid nephrotoxins  -Adjust medications to GFR      Thank you for involving us in the care of Nic Nicolas.  Please feel free to call with any questions.    Mau Cavanaugh MD  12/06/21  17:11 [facility]    [facility]  [phone]      Much of this encounter note is an electronic transcription/translation of spoken language to printed text. The electronic translation of spoken language may permit erroneous, or at times, nonsensical words or phrases to be inadvertently transcribed; Although I have reviewed the note for such errors, some may still exist.

## 2021-12-06 NOTE — PAYOR COMM NOTE
"  SUBJECT:  Inpatient Auth Request- Passport  Patient’s Name:  Parish Wright     MRN# [id number]  Admitting MD Name:  Dr Patrice Campos  Admitting Physician NPI:  [id number]  Admitting Physicians Phone:  [phone]  Admitting Physicians Address:    [facility] [address] [facility]  Admitting ICD - 10:  Hepatic Encephalopathy K72.90; IVELISSE N17.9; Pneumonia J18.9  DOS:  12/5/21  DOCUMENTS SENT:  ___x__ Face Sheet  __X___ ED Records ( Partial UNSIGNED Provider note)  __X___ H & P (PARTIAL UNSIGNED  __X__ LABS   __X___ Imaging  __X___ MED List  __X___ ADMIT ORDERS    Parish Wright (56 y.o. Male)             Date of Birth Social Security Number Address Home Phone MRN    1965  [address] [facility] [id number] [phone] [id number]    Spiritism Marital Status             Religious        Admission Date Admission Type Admitting Provider Attending Provider Department, Room/Bed    12/5/21 Emergency  Efrain Gipson MD [facility] EMERGENCY ROOM, 18/18    Discharge Date Discharge Disposition Discharge Destination                         Attending Provider: Efrain Gipson MD    Allergies: No Known Allergies    Isolation: None   Infection: COVID (rule out) (12/05/21)   Code Status: Not on file   Advance Care Planning Activity    Ht: 188 cm (74\")   Wt: 140 kg (308 lb 13.8 oz)    Admission Cmt: None   Principal Problem: None                Active Insurance as of 12/5/2021     Primary Coverage     Payor Plan Insurance Group Employer/Plan Group    [facility] BY SUZANNE [id number]     Payor Plan Address Payor Plan Phone Number Payor Plan Fax Number Effective Dates    [address]   1/1/2021 - None Entered    [facility] [id number]       Subscriber Name Subscriber Birth Date Member ID       PARISH WRIGHT 1965 [id number]                 Emergency Contacts      (Rel.) Home Phone Work Phone Mobile Phone    CASIE CABALLERO (Friend) " [phone] -- [phone]    Christo Soria (Friend) [phone] -- --    DG MURPHY (Sister) -- -- [phone]            Erin: JUNI [id number] Tax ID [id number]  Problem List           Codes Noted - Resolved       Hospital    Hepatic encephalopathy (HCC) ICD-10-CM: K72.90  ICD-9-CM: 572.2 12/5/2021 - Present       Non-Hospital    Gastrointestinal hemorrhage associated with peptic ulcer ICD-10-CM: K27.4  ICD-9-CM: 533.40 12/3/2021 - Present    Anxiety ICD-10-CM: F41.9  ICD-9-CM: 300.00 12/3/2021 - Present    Acid reflux ICD-10-CM: K21.9  ICD-9-CM: 530.81 12/2/2021 - Present    Gross hematuria ICD-10-CM: R31.0  ICD-9-CM: 599.71 12/2/2021 - Present    Hesitancy of micturition ICD-10-CM: R39.11  ICD-9-CM: 788.64 12/2/2021 - Present    Chronic cystitis ICD-10-CM: N30.20  ICD-9-CM: 595.2 11/10/2021 - Present    Body mass index (BMI) 40.0-44.9, adult (HCC) ICD-10-CM: Z68.41  ICD-9-CM: V85.41 11/2/2021 - Present    Colon polyps ICD-10-CM: K63.5  ICD-9-CM: 211.3 11/2/2021 - Present    Cirrhosis (HCC) ICD-10-CM: K74.60  ICD-9-CM: 571.5 11/2/2021 - Present    Hyperlipidemia ICD-10-CM: E78.5  ICD-9-CM: 272.4 11/2/2021 - Present    Sleep apnea ICD-10-CM: G47.30  ICD-9-CM: 780.57 11/2/2021 - Present    High blood pressure ICD-10-CM: I10  ICD-9-CM: 401.9 11/8/2019 - Present    Systolic congestive heart failure (HCC) ICD-10-CM: I50.20  ICD-9-CM: 428.20, 428.0 11/8/2019 - Present    Pancytopenia (HCC) ICD-10-CM: D61.818  ICD-9-CM: 284.19 5/21/2018 - Present    Bilateral lower extremity edema ICD-10-CM: R60.0  ICD-9-CM: 782.3 5/21/2018 - Present    Diabetes mellitus without complication (HCC) ICD-10-CM: E11.9  ICD-9-CM: 250.00 5/21/2018 - Present    Pre-operative cardiovascular examination ICD-10-CM: Z01.810  ICD-9-CM: V72.81 5/21/2018 - Present    Arthritis, senescent ICD-10-CM: M19.90  ICD-9-CM: 715.90 3/26/2015 - Present    Chronic low back pain ICD-10-CM: M54.50, G89.29  ICD-9-CM: 724.2, 338.29 3/26/2015 - Present  "   Acetabular fracture (HCC) ICD-10-CM: S32.409A  ICD-9-CM: 808.0 3/26/2015 - Present               Emergency Department Notes      Geoff Persaud RN at 12/05/21 1549        NG tube inserted.  Medications administered.  NG tube removed by pt after med administration.  MD Informed.      Geoff Persaud RN  12/05/21 1550      Electronically signed by Geoff Persaud RN at 12/05/21 1550      ED MD Partial Unsigned Notes    Efrain Gipson MD   Physician   Emergency Medicine   ED Provider Notes       Shared   Date of Service:  12/05/21 1404   Creation Time:  12/05/21 1404              Shared        Expand All Collapse All      Time: 2:04 PM EST  Arrived by: ambulance  Chief Complaint: SI   History provided by: ED nurse   History is limited by: AMS      History of Present Illness:  Patient is a 56 y.o. year old male that presents to the emergency department with SI. Per ED nurse, it is reported that the pt told his neighbor that he wanted to \"blow his brains out.\" EMS was called due to SI. Pt was combative and agitated so EMS administered 5 mg of Versed.      Pt has hx of cirrhosis.         History provided by: ED nurse.  History limited by:  Mental status change        Recently seen: Pt was last seen in this ED on 11/18/21 for fall.         Patient Care Team  Primary Care Provider: Britney Echevarria APRN     Past Medical History:      No Known Allergies  Medical History        Past Medical History:   Diagnosis Date   • Asthma     • Cirrhosis (HCC)     • Colon polyps     • Diabetes mellitus (HCC)     • Hypertension     • Liver disease     • Reflux esophagitis     • Renal disorder     • Sleep apnea           Surgical History         Past Surgical History:   Procedure Laterality Date   • COLONOSCOPY   2018, 2020   • ENDOSCOPY   2019   • FRACTURE SURGERY       • LEG SURGERY       • PELVIC FRACTURE SURGERY       • UPPER GASTROINTESTINAL ENDOSCOPY                   Family History   Problem Relation Age of Onset "   • Stomach cancer Sister     • Lung cancer Father     • Colon cancer Neg Hx           Home Medications:          Prior to Admission medications    Medication Sig Start Date End Date Taking? Authorizing Provider   amLODIPine (NORVASC) 10 MG tablet Take 1 tablet by mouth Daily. 11/2/21   Yes Britney Echevarria APRN   baclofen (LIORESAL) 10 MG tablet Take 10 mg by mouth 3 (Three) Times a Day.     Yes Joi Gonzalez MD   busPIRone (BUSPAR) 5 MG tablet Take 1 tablet by mouth 3 (Three) Times a Day. 11/2/21   Yes Britney Echevarria APRN   cyclobenzaprine (FLEXERIL) 10 MG tablet Take 1 tablet by mouth 3 (Three) Times a Day As Needed for Muscle Spasms. 11/2/21   Yes Britney Echevarria APRN   furosemide (LASIX) 40 MG tablet Take 2 tablets by mouth 3 (Three) Times a Day for 30 days.  Patient taking differently: Take 40 mg by mouth 3 (Three) Times a Day. 11/10/21 12/10/21 Yes Britney Echevarria APRN   glimepiride (Amaryl) 1 MG tablet Take 1 tablet by mouth Every Morning Before Breakfast for 30 days. 11/2/21 12/5/21 Yes Britney Echevarria APRN   ketorolac (TORADOL) 10 MG tablet Take 10 mg by mouth Every 6 (Six) Hours As Needed for Moderate Pain .     Yes Joi Gonzalez MD   meclizine (ANTIVERT) 25 MG tablet Take 25 mg by mouth 3 (Three) Times a Day As Needed for Dizziness.     Yes Joi Gonzalez MD   Morphine (MSIR) 15 MG tablet Take 1 tablet by mouth Every 8 (Eight) Hours As Needed for Severe Pain . 12/3/21   Yes Jonh Franco Jr., MD   OMEPRAZOLE PO Take 20 mg by mouth Daily.     Yes Joi Gonzalez MD   oxyCODONE-acetaminophen (PERCOCET)  MG per tablet Take 1 tablet by mouth Every 8 (Eight) Hours As Needed for Moderate Pain . bottle empty     Yes Joi Gonzalez MD   sertraline (ZOLOFT) 50 MG tablet Take 50 mg by mouth Daily.     Yes Joi Gonzalez MD   spironolactone (ALDACTONE) 100 MG tablet Take 2 tablets by mouth Daily. 11/19/21   Yes Erin Chavez, NP  "  albuterol sulfate  (90 Base) MCG/ACT inhaler Inhale 2 puffs Every 4 (Four) Hours As Needed for Wheezing. 11/2/21     Britney Echevarria APRN   Alcohol Swabs (Alcohol Prep) pads 1 pad 5 (Five) Times a Day. 12/3/21     Jonh Franco Jr., MD   Glucose Blood (Blood Glucose Test) strip 1 strip by In Vitro route 5 (Five) Times a Day. 12/3/21     Jonh Franco Jr., MD   Insulin Glargine (LANTUS SOLOSTAR) 100 UNIT/ML injection pen Inject 66 Units under the skin into the appropriate area as directed Daily for 30 days. 12/3/21 1/2/22   Jonh Franco Jr., MD   lactulose (CHRONULAC) 10 GM/15ML solution Take 30 mL by mouth 2 (Two) Times a Day. 11/19/21     Erin Chavez, MITRA   Lancets (freestyle) lancets 1 each by Other route As Needed (glucose check). 12/3/21     Jonh Franco Jr., MD   ondansetron ODT (ZOFRAN-ODT) 4 MG disintegrating tablet Place 1 tablet under the tongue 4 (Four) Times a Day As Needed for Nausea or Vomiting. 11/24/21     Britney Echevarria APRN   Potassium 99 MG tablet Apply 99 mg to the mouth or throat 2 (Two) Times a Day. 11/2/21     Britney Echevarria APRN   propranolol (INDERAL) 20 MG tablet Take 3 tablets by mouth 2 (Two) Times a Day for 30 days.  Patient taking differently: Take 40 mg by mouth 2 (Two) Times a Day. 11/10/21 12/10/21   Britney Echevarria APRN         Social History:   Social History           Tobacco Use   • Smoking status: Never Smoker   • Smokeless tobacco: Never Used   Vaping Use   • Vaping Use: Never used   Substance Use Topics   • Alcohol use: Never   • Drug use: Never      Recent travel: not applicable      Review of Systems:  Review of Systems   Unable to perform ROS: Mental status change         Physical Exam:  /75 (BP Location: Left arm, Patient Position: Lying)   Pulse 106   Temp 97.7 °F (36.5 °C) (Oral)   Resp 15   Ht 188 cm (74\")   Wt (!) 140 kg (308 lb 13.8 oz)   SpO2 100%   BMI 39.66 kg/m²      Physical " Exam  Vitals and nursing note reviewed.   Constitutional:       General: He is not in acute distress.     Appearance: Normal appearance.      Comments: Sedated/groaning.    HENT:      Head: Normocephalic and atraumatic.      Nose: Nose normal.   Eyes:      General: No scleral icterus.     Comments: Dilated bilaterally.    Cardiovascular:      Rate and Rhythm: Normal rate and regular rhythm.      Pulses:           Dorsalis pedis pulses are 2+ on the right side and 2+ on the left side.      Heart sounds: Normal heart sounds.   Pulmonary:      Effort: Pulmonary effort is normal. No respiratory distress.      Breath sounds: Normal breath sounds.   Abdominal:      Palpations: Abdomen is soft.      Tenderness: There is no abdominal tenderness.   Musculoskeletal:         General: Normal range of motion.      Cervical back: Neck supple.      Right lower leg: Edema (moderate nonpitting ) present.      Left lower leg: Edema (moderate nonpitting ) present.      Comments: Chroncic discoloration of BLE, left greater than right. No evidence of vascular occlusion.    Skin:     General: Skin is warm and dry.   Neurological:      Comments: Responds to physical stimuli.               Medications in the Emergency Department:  Medications   sodium chloride 0.9 % flush 10 mL (has no administration in time range)         Labs           Lab Results (last 24 hours)      Procedure Component Value Units Date/Time     CBC & Differential [[id number]]  (Abnormal) Collected: 12/05/21 1325     Specimen: Blood Updated: 12/05/21 1400     Narrative:       The following orders were created for panel order CBC & Differential.  Procedure                               Abnormality         Status                     ---------                               -----------         ------                     CBC Auto Differential[[id number]]        Abnormal            Final result                  Please view results for these tests on the individual orders.      Comprehensive Metabolic Panel [[id number]] Collected: 12/05/21 1325     Specimen: Blood Updated: 12/05/21 1335     Acetaminophen Level [[id number]]  (Normal) Collected: 12/05/21 1325     Specimen: Blood Updated: 12/05/21 1400       Acetaminophen <5.0 mcg/mL       Ethanol [[id number]] Collected: 12/05/21 1325     Specimen: Blood Updated: 12/05/21 1400       Ethanol <10 mg/dL         Ethanol % <0.010 %       Narrative:       Ethanol (Plasma)  <10 Essentially Negative     Toxic Concentrations           mg/dL     Flushing, slowing of reflexes    Impaired visual activity         Depression of CNS              >100  Possible Coma                  >300        Salicylate Level [[id number]]  (Normal) Collected: 12/05/21 1325     Specimen: Blood Updated: 12/05/21 1400       Salicylate <0.3 mg/dL       Ammonia [[id number]]  (Abnormal) Collected: 12/05/21 1325     Specimen: Blood Updated: 12/05/21 1401       Ammonia 131 umol/L       CBC Auto Differential [[id number]]  (Abnormal) Collected: 12/05/21 1325     Specimen: Blood Updated: 12/05/21 1400       WBC 6.35 10*3/mm3         RBC 2.92 10*6/mm3         Hemoglobin 8.7 g/dL         Hematocrit 26.0 %         MCV 89.0 fL         MCH 29.8 pg         MCHC 33.5 g/dL         RDW 15.4 %         RDW-SD 50.4 fl         MPV 11.7 fL         Platelets 63 10*3/mm3         Neutrophil % 86.5 %         Lymphocyte % 6.0 %         Monocyte % 5.7 %         Eosinophil % 0.0 %         Basophil % 0.2 %         Immature Grans % 1.6 %         Neutrophils, Absolute 5.50 10*3/mm3         Lymphocytes, Absolute 0.38 10*3/mm3         Monocytes, Absolute 0.36 10*3/mm3         Eosinophils, Absolute 0.00 10*3/mm3         Basophils, Absolute 0.01 10*3/mm3         Immature Grans, Absolute 0.10 10*3/mm3         nRBC 0.0 /100 WBC       POC Glucose Once [[id number]]  (Abnormal) Collected: 12/05/21 1346     Specimen: Blood Updated: 12/05/21 1347       Glucose 222 mg/dL         Comment: Serial Number:  [id number]:  [id number]        Urine Drug Screen - Urine, Catheter [[id number]] Collected: 21 1349     Specimen: Urine, Catheter Updated: 21 1358             Imaging:  No Radiology Exams Resulted Within Past 24 Hours     Procedures:  Procedures     Progress  ED Course as of 21 1751   Sun Dec 05, 2021   1742 Case discussed with Dr. Campos for admit.  He request UA and to replace the NG tube the patient has recently dislodged. [RP]       ED Course User Index  [RP] Efrain Gipson MD                                Medical Decision Making:  MDM      Final diagnoses:       Hepatic encephalopathy  IVELISSE (acute kidney injury     Disposition:      ED Disposition      None          Documentation assistance provided by Flavia Cornelius acting as scribe for Efrain Gipson MD. Information recorded by the scribe was done at my direction and has been verified and validated by me.         Flavia Cornelius  21 141                  ED on 2021     ED on 2021       Admission H&P     Patrice Campos MD   Physician   Hospitalist   H&P       Incomplete   Date of Service:  21   Creation Time:  21              Incomplete        Expand All Collapse All       [facility]   HOSPITALIST HISTORY AND PHYSICAL  Date: 2021   Patient Name: Nic Nicolas  : 1965  MRN: [id number]  Primary Care Physician:  Britney Echevarria APRN  Date of admission: 2021        Subjective      Subjective      Chief Complaint: Suicidal ideation and ultimately status     HPI:     Nic Nicolas is a 56 y.o. male with history of cirrhosis, diabetes, hypertension, sleep apnea and asthma who presented by EMS due to suicidal ideation.  This information was obtained from the ED physician and from chart review.  According to the ED physician patient presented by EMS because he told his neighbor that he wanted to blow his brains out and he was combative and agitated.  I was not able  to get any information from the patient as he is combative and he spits on nurses and physicians.  In the ED they were able to put NG tube and give him lactulose but the patient was able to pull the NG tube after he was given the lactulose.  While in the ED his Covid came back negative but chest x-ray showed possible right lobe pneumonia.  In the ED he was given ceftriaxone azithromycin.  His labs showed an ammonia of 131.  His creatinine is up to 2 from a baseline of 0.68.  Patient was giving 500 IV bolus.  Patient had a recent paracentesis therapeutic on 1129 in which 8.2 L was removed and it did not look infected.  He recently followed up with his PCP on the third of this month.        Personal History      Medical History        Past Medical History:   Diagnosis Date   • Asthma     • Cirrhosis (HCC)     • Colon polyps     • Diabetes mellitus (HCC)     • Hypertension     • Liver disease     • Reflux esophagitis     • Renal disorder     • Sleep apnea                 Surgical History         Past Surgical History:   Procedure Laterality Date   • COLONOSCOPY   2018, 2020   • ENDOSCOPY   2019   • FRACTURE SURGERY       • LEG SURGERY       • PELVIC FRACTURE SURGERY       • UPPER GASTROINTESTINAL ENDOSCOPY                         Family History   Problem Relation Age of Onset   • Stomach cancer Sister     • Lung cancer Father     • Colon cancer Neg Hx              Social History   Social History           Socioeconomic History   • Marital status:    Tobacco Use   • Smoking status: Never Smoker   • Smokeless tobacco: Never Used   Vaping Use   • Vaping Use: Never used   Substance and Sexual Activity   • Alcohol use: Never   • Drug use: Never   • Sexual activity: Defer               Home Medications:  Alcohol Prep, Blood Glucose Test, Insulin Glargine, Morphine, Potassium, albuterol sulfate HFA, amLODIPine, baclofen, busPIRone, cyclobenzaprine, freestyle, furosemide, glimepiride, ketorolac, lactulose, meclizine,  omeprazole, ondansetron ODT, oxyCODONE-acetaminophen, propranolol, sertraline, and spironolactone     Allergies:  No Known Allergies     Review of Systems   Unable to perform ROS: Mental status change               Objective      Objective      Vitals:   Temp:  [97.7 °F (36.5 °C)] 97.7 °F (36.5 °C)  Heart Rate:  [] 84  Resp:  [15] 15  BP: (123-179)/() 123/102     Physical Exam                         Constitutional: Combative               Eyes: Pupils equal, sclerae anicteric, no conjunctival injection              HENT: NCAT, mucous membranes moist              Neck: Supple, no thyromegaly, no lymphadenopathy, trachea midline              Respiratory: Clear to auscultation bilaterally, nonlabored respirations               Cardiovascular: RRR, no murmurs, rubs, or gallops, palpable pedal pulses bilaterally              Gastrointestinal: Positive bowel sounds, soft, nontender, nondistended              Musculoskeletal: No bilateral ankle edema, no clubbing or cyanosis to extremities              Psychiatric: Appropriate affect, cooperative              Neurologic: Patient is altered              Skin: No rashes            Result Review       Result Review:  I have personally reviewed the results from the time of this admission to 12/5/2021 18:03 EST and agree with these findings:  [x]?  Laboratory  []?  Microbiology  [x]?  Radiology  []?  EKG/Telemetry   []?  Cardiology/Vascular   []?  Pathology  []?  Old records  []?  Other:              Imaging Results (Last 24 Hours)      Procedure Component Value Units Date/Time     XR Chest 1 View [[id number]] Collected: 12/05/21 1605       Updated: 12/05/21 1608     Narrative:       PROCEDURE:        XR CHEST 1 VW     COMPARISON:        [facility], , XR CHEST 1 VW, 11/18/2021, 11:24.     INDICATIONS:        Rule out pneumonia, verify NG tube placement     FINDINGS:          There is no nasogastric tube imaged.  The heart size and pulmonary vessels  are normal.  There is   patchy airspace disease in the periphery of the left midlung and in throughout the right lung   consistent with multifocal pneumonia.  The osseous structures are normal for age.     CONCLUSION:        Mild diffuse right lung and left midlung alveolar airspace disease consistent with   pneumonia.                  RACHELL DEMPSEY MD         Electronically Signed and Approved By: RACHELL DEMPSEY MD on 12/05/2021 at 16:05                      CT Head Without Contrast [[id number]] Collected: 12/05/21 1452       Updated: 12/05/21 1455     Narrative:       PROCEDURE:        CT HEAD WO CONTRAST     COMPARISON:         [facility], CT, CT HEAD WO CONTRAST, 11/16/2021, 11:33.  INDICATIONS:        trauma     PROTOCOL:        Standard imaging protocol performed            RADIATION:        DLP: 1082.2 mGy*cm          MA and/or KV was adjusted to minimize radiation dose.                TECHNIQUE:        After obtaining the patient's consent, CT images were obtained without non-ionic   intravenous contrast material.      FINDINGS:          There is mild diffuse generalized atrophy.  There is no mass or mass effect.  There are no abnormal   extra-axial fluid collections.  There are no areas of acute hemorrhage.  There is minimal right   maxillary sinus disease.     CONCLUSION:        No acute intracranial abnormality            RACHELL DEMPSEY MD         Electronically Signed and Approved By: RACHELL DEMPSEY MD on 12/05/2021 at 14:52                                    Assessment/Plan      Assessment / Plan      Assessment  #Hepatic cephalopathy  #Acute kidney injury  #Pneumonia  #History of diabetes  #History of asthma  #History of depression  #History of hyperlipidemia  #Thrombocytopenia  #Chronic pain           Plan:   -Admit to telemetry bed  -Lactulose enema every 6 hours  -Continue steroids and ceftriaxone  -Blood culture  -Urinalysis  -GI consult  -Nephrology consult  -Urine sodium  -Urine  potassium  -Urine chloride  -Urine creatinine  -Abdominal ultrasound  -Hold Lasix and Aldactone  -Sputum culture  -Insulin  -One-on-one sitter  -Suicidal precaution              DVT prophylaxis:  No DVT prophylaxis order currently exists.     CODE STATUS:       Admission Status:  I believe this patient meets inpatient status.     Patrice Campos MD                                      ED on 12/5/2021     ED on 12/5/2021        Detailed Report        ROS Info                          Intake & Output (last day)       12/05 0701 12/06 0700    IV Piggyback 500    Total Intake(mL/kg) 500 (3.6)    Net +500             Lines, Drains & Airways     Active LDAs     Name Placement date Placement time Site Days    Peripheral IV 12/05/21 1400 Right Antecubital 12/05/21  1400  Antecubital  less than 1    NG/OG Tube Nasogastric 16 Fr Right nostril 12/05/21  1845  Right nostril  less than 1                  Current Facility-Administered Medications   Medication Dose Route Frequency Provider Last Rate Last Admin   • albuterol sulfate HFA (PROVENTIL HFA;VENTOLIN HFA;PROAIR HFA) inhaler 2 puff  2 puff Inhalation Q4H PRN Patrice Campos MD       • amLODIPine (NORVASC) tablet 10 mg  10 mg Oral Daily Patrice Campos MD       • AZITHROMYCIN 500 MG/250 ML 0.9% NS IVPB (vial-mate)  500 mg Intravenous Once Patrice Campos MD   500 mg at 12/05/21 1930   • AZITHROMYCIN 500 MG/250 ML 0.9% NS IVPB (vial-mate)  500 mg Intravenous Daily Patrice Campos MD       • busPIRone (BUSPAR) tablet 5 mg  5 mg Oral TID Patrice Campos MD       • cefTRIAXone (ROCEPHIN) IVPB 1 g  1 g Intravenous Q24H Patrice Campos MD       • dextrose (GLUTOSE) oral gel 15 g  15 g Oral Q15 Min PRN Patrice Campos MD       • dextrose 10 % infusion  25 g Intravenous Q15 Min PRN Patrice Campos MD       • glucagon (human recombinant) (GLUCAGEN DIAGNOSTIC) injection 1 mg  1 mg Subcutaneous Q15 Min PRN Patrice Campos MD       • heparin (porcine) 5000 UNIT/ML injection 5,000 Units  5,000  Units Subcutaneous Q8H Patrice Campos MD       • insulin detemir (LEVEMIR) injection 30 Units  30 Units Subcutaneous Nightly Patrice Campos MD       • insulin lispro (humaLOG) injection 0-7 Units  0-7 Units Subcutaneous TID AC Patrice Campos MD       • lactulose (CHRONULAC) 10 GM/15ML solution 20 g  20 g Oral Daily Patrice Campos MD   20 g at 12/05/21 1536   • lactulose in sterile water enema 300 mL  300 mL Rectal Q6H PRN Patrice Campos MD       • nitroglycerin (NITROSTAT) SL tablet 0.4 mg  0.4 mg Sublingual Q5 Min PRN Patrice Campos MD       • [START ON 12/6/2021] pantoprazole (PROTONIX) EC tablet 40 mg  40 mg Oral QAM Patrice Campos MD       • propranolol (INDERAL) tablet 60 mg  60 mg Oral BID Patrice Campos MD       • [START ON 12/6/2021] sertraline (ZOLOFT) tablet 50 mg  50 mg Oral Daily Patrice Campos MD       • sodium chloride 0.9 % flush 10 mL  10 mL Intravenous PRN Patrice Campos MD       • sodium chloride 0.9 % flush 10 mL  10 mL Intravenous Q12H Patrice Campos MD       • sodium chloride 0.9 % flush 10 mL  10 mL Intravenous PRN Patrice Campos MD         Current Outpatient Medications   Medication Sig Dispense Refill   • cyclobenzaprine (FLEXERIL) 10 MG tablet Take 1 tablet by mouth 3 (Three) Times a Day As Needed for Muscle Spasms. 12 tablet 0   • albuterol sulfate  (90 Base) MCG/ACT inhaler Inhale 2 puffs Every 4 (Four) Hours As Needed for Wheezing. 90 g 0   • Alcohol Swabs (Alcohol Prep) pads 1 pad 5 (Five) Times a Day. 200 each 3   • amLODIPine (NORVASC) 10 MG tablet Take 1 tablet by mouth Daily. 30 tablet 0   • baclofen (LIORESAL) 10 MG tablet Take 10 mg by mouth 3 (Three) Times a Day.     • busPIRone (BUSPAR) 5 MG tablet Take 1 tablet by mouth 3 (Three) Times a Day. 30 tablet 0   • furosemide (LASIX) 40 MG tablet Take 2 tablets by mouth 3 (Three) Times a Day for 30 days. (Patient taking differently: Take 40 mg by mouth 3 (Three) Times a Day.) 180 tablet 0   • glimepiride (Amaryl) 1 MG tablet  Take 1 tablet by mouth Every Morning Before Breakfast for 30 days. 30 tablet 0   • Glucose Blood (Blood Glucose Test) strip 1 strip by In Vitro route 5 (Five) Times a Day. 100 each 3   • Insulin Glargine (LANTUS SOLOSTAR) 100 UNIT/ML injection pen Inject 66 Units under the skin into the appropriate area as directed Daily for 30 days. 15 pen 3   • ketorolac (TORADOL) 10 MG tablet Take 10 mg by mouth Every 6 (Six) Hours As Needed for Moderate Pain .     • lactulose (CHRONULAC) 10 GM/15ML solution Take 30 mL by mouth 2 (Two) Times a Day. 473 mL 2   • Lancets (freestyle) lancets 1 each by Other route As Needed (glucose check). 100 each 3   • meclizine (ANTIVERT) 25 MG tablet Take 25 mg by mouth 3 (Three) Times a Day As Needed for Dizziness.     • Morphine (MSIR) 15 MG tablet Take 1 tablet by mouth Every 8 (Eight) Hours As Needed for Severe Pain . 21 tablet 0   • omeprazole (priLOSEC) 20 MG capsule Take 20 mg by mouth Daily.     • ondansetron ODT (ZOFRAN-ODT) 4 MG disintegrating tablet Place 1 tablet under the tongue 4 (Four) Times a Day As Needed for Nausea or Vomiting. 30 tablet 0   • oxyCODONE-acetaminophen (PERCOCET)  MG per tablet Take 1 tablet by mouth Every 8 (Eight) Hours As Needed for Moderate Pain . bottle empty     • Potassium 99 MG tablet Apply 99 mg to the mouth or throat 2 (Two) Times a Day. 180 each 1   • propranolol (INDERAL) 20 MG tablet Take 3 tablets by mouth 2 (Two) Times a Day for 30 days. (Patient taking differently: Take 40 mg by mouth 2 (Two) Times a Day.) 180 tablet 0   • sertraline (ZOLOFT) 50 MG tablet Take 50 mg by mouth Daily.     • spironolactone (ALDACTONE) 100 MG tablet Take 2 tablets by mouth Daily. 60 tablet 3       Lab Results (last 24 hours)     Procedure Component Value Units Date/Time    Urinalysis, Microscopic Only - Urine, Clean Catch [[id number]]  (Abnormal) Collected: 12/05/21 1349    Specimen: Urine, Clean Catch Updated: 12/05/21 1837     RBC, UA None Seen /HPF      WBC, UA  Too Numerous to Count /HPF      Bacteria, UA 4+ /HPF      Squamous Epithelial Cells, UA 3-6 /HPF      Hyaline Casts, UA None Seen /LPF      Methodology Manual Light Microscopy    Urinalysis With Culture If Indicated - Urine, Clean Catch [[id number]]  (Abnormal) Collected: 12/05/21 1349    Specimen: Urine, Clean Catch Updated: 12/05/21 1837     Color, UA Dark Yellow     Appearance, UA Turbid     pH, UA 5.5     Specific Gravity, UA 1.017     Glucose, UA Negative     Ketones, UA Negative     Bilirubin, UA Small (1+)     Blood, UA Moderate (2+)     Protein,  mg/dL (2+)     Leuk Esterase, UA Large (3+)     Nitrite, UA Negative     Urobilinogen, UA 1.0 E.U./dL    Urine Culture - Urine, Urine, Clean Catch [[id number]] Collected: 12/05/21 1349    Specimen: Urine, Clean Catch Updated: 12/05/21 1837    Blood Culture - Blood, Blood, Venous Line [[id number]] Collected: 12/05/21 1815    Specimen: Blood, Venous Line Updated: 12/05/21 1821    Blood Culture - Blood, Arm, Left [[id number]] Collected: 12/05/21 1814    Specimen: Blood from Arm, Left Updated: 12/05/21 1821    COVID-19,CEPHEID/ANNE MARIE/BDMAX,COR/CATRACHITA/PAD/PAO IN-HOUSE(OR EMERGENT/ADD-ON),NP SWAB IN TRANSPORT MEDIA 3-4 HR TAT, RT-PCR - Swab, Nasopharynx [[id number]]  (Normal) Collected: 12/05/21 1358    Specimen: Swab from Nasopharynx Updated: 12/05/21 1734     COVID19 Not Detected    Narrative:      Fact sheet for providers: https://www.fda.gov/media/074936/download     Fact sheet for patients: https://www.fda.gov/media/269336/download  Presumptive Positive: additional testing may be indicated if it is necessary to differentiate between SARS-CoV-2 and other Sarbecovirus, for epidemiological purposes, or clinical management.    CK [[id number]]  (Normal) Collected: 12/05/21 1325    Specimen: Blood Updated: 12/05/21 1447     Creatine Kinase 84 U/L     BNP [[id number]]  (Normal) Collected: 12/05/21 1325    Specimen: Blood Updated: 12/05/21 1444     proBNP 734.2 pg/mL      Narrative:      Among patients with dyspnea, NT-proBNP is highly sensitive for the detection of acute congestive heart failure. In addition NT-proBNP of <300 pg/ml effectively rules out acute congestive heart failure with 99% negative predictive value.    Results may be falsely decreased if patient taking Biotin.      Urine Drug Screen - Urine, Catheter [[id number]]  (Abnormal) Collected: 12/05/21 1349    Specimen: Urine, Catheter Updated: 12/05/21 1442     Amphet/Methamphet, Screen Negative     Barbiturates Screen, Urine Negative     Benzodiazepine Screen, Urine Negative     Cocaine Screen, Urine Negative     Opiate Screen Positive     THC, Screen, Urine Negative     Methadone Screen, Urine Negative     Oxycodone Screen, Urine Negative    Narrative:      Negative Thresholds Per Drugs Screened:    Amphetamines                 500 ng/ml  Barbiturates                 200 ng/ml  Benzodiazepines              100 ng/ml  Cocaine                      300 ng/ml  Methadone                    300 ng/ml  Opiates                      300 ng/ml  Oxycodone                    100 ng/ml  THC                           50 ng/ml    The Normal Value for all drugs tested is negative. This report includes final unconfirmed screening results to be used for medical treatment purposes only. Unconfirmed results must not be used for non-medical purposes such as employment or legal testing. Clinical consideration should be applied to any drug of abuse test, particularly when unconfirmed results are used.            Comprehensive Metabolic Panel [[id number]]  (Abnormal) Collected: 12/05/21 1325    Specimen: Blood Updated: 12/05/21 1441     Glucose 243 mg/dL      BUN 30 mg/dL      Creatinine 2.08 mg/dL      Sodium 137 mmol/L      Potassium 4.1 mmol/L      Chloride 102 mmol/L      CO2 18.7 mmol/L      Calcium 8.1 mg/dL      Total Protein 5.6 g/dL      Albumin 2.60 g/dL      ALT (SGPT) 11 U/L      AST (SGOT) 28 U/L      Alkaline Phosphatase 157 U/L       Total Bilirubin 2.2 mg/dL      eGFR Non African Amer 33 mL/min/1.73      Globulin 3.0 gm/dL      A/G Ratio 0.9 g/dL      BUN/Creatinine Ratio 14.4     Anion Gap 16.3 mmol/L     Narrative:      GFR Normal >60  Chronic Kidney Disease <60  Kidney Failure <15      Ammonia [[id number]]  (Abnormal) Collected: 12/05/21 1325    Specimen: Blood Updated: 12/05/21 1401     Ammonia 131 umol/L     CBC & Differential [[id number]]  (Abnormal) Collected: 12/05/21 1325    Specimen: Blood Updated: 12/05/21 1400    Narrative:      The following orders were created for panel order CBC & Differential.  Procedure                               Abnormality         Status                     ---------                               -----------         ------                     CBC Auto Differential[[id number]]        Abnormal            Final result                 Please view results for these tests on the individual orders.    CBC Auto Differential [[id number]]  (Abnormal) Collected: 12/05/21 1325    Specimen: Blood Updated: 12/05/21 1400     WBC 6.35 10*3/mm3      RBC 2.92 10*6/mm3      Hemoglobin 8.7 g/dL      Hematocrit 26.0 %      MCV 89.0 fL      MCH 29.8 pg      MCHC 33.5 g/dL      RDW 15.4 %      RDW-SD 50.4 fl      MPV 11.7 fL      Platelets 63 10*3/mm3      Neutrophil % 86.5 %      Lymphocyte % 6.0 %      Monocyte % 5.7 %      Eosinophil % 0.0 %      Basophil % 0.2 %      Immature Grans % 1.6 %      Neutrophils, Absolute 5.50 10*3/mm3      Lymphocytes, Absolute 0.38 10*3/mm3      Monocytes, Absolute 0.36 10*3/mm3      Eosinophils, Absolute 0.00 10*3/mm3      Basophils, Absolute 0.01 10*3/mm3      Immature Grans, Absolute 0.10 10*3/mm3      nRBC 0.0 /100 WBC     Acetaminophen Level [[id number]]  (Normal) Collected: 12/05/21 1325    Specimen: Blood Updated: 12/05/21 1400     Acetaminophen <5.0 mcg/mL     Ethanol [[id number]] Collected: 12/05/21 1325    Specimen: Blood Updated: 12/05/21 1400     Ethanol <10 mg/dL      Ethanol % <0.010  %     Narrative:      Ethanol (Plasma)  <10 Essentially Negative    Toxic Concentrations           mg/dL    Flushing, slowing of reflexes    Impaired visual activity         Depression of CNS              >100  Possible Coma                  >300       Salicylate Level [[id number]]  (Normal) Collected: 12/05/21 1325    Specimen: Blood Updated: 12/05/21 1400     Salicylate <0.3 mg/dL     [facility] Draw [[id number]] Collected: 12/05/21 1325    Specimen: Blood Updated: 12/05/21 1354    Narrative:      The following orders were created for panel order [place] Draw.  Procedure                               Abnormality         Status                     ---------                               -----------         ------                     Green Top (Gel)[[id number]]                                  Final result               Lavender Top[[id number]]                                     Final result               Gold Top - SST[[id number]]                                   Final result               Light Blue Top[[id number]]                                   Final result                 Please view results for these tests on the individual orders.    Gold Top - SST [[id number]] Collected: 12/05/21 1325    Specimen: Blood Updated: 12/05/21 1354     Extra Tube Hold for add-ons.     Comment: Auto resulted.       Light Blue Top [[id number]] Collected: 12/05/21 1325    Specimen: Blood Updated: 12/05/21 1354     Extra Tube hold for add-on     Comment: Auto resulted       Green Top (Gel) [[id number]] Collected: 12/05/21 1325    Specimen: Blood Updated: 12/05/21 1354     Extra Tube Hold for add-ons.     Comment: Auto resulted.       Lavender Top [[id number]] Collected: 12/05/21 1325    Specimen: Blood Updated: 12/05/21 1354     Extra Tube hold for add-on     Comment: Auto resulted       POC Glucose Once [[id number]]  (Abnormal) Collected: 12/05/21 1346    Specimen: Blood Updated: 12/05/21 1347     Glucose 222 mg/dL      Comment:  Serial Number: [id number]:  [id number]           Imaging Results (Last 24 Hours)     Procedure Component Value Units Date/Time    XR Chest 1 View [[id number]] Collected: 12/05/21 2000     Updated: 12/05/21 2003    Narrative:      PROCEDURE: XR CHEST 1 VW     COMPARISON: [facility], , XR CHEST 1 VW, 12/05/2021, 15:08.     INDICATIONS: NG TUBE PALCEMENT.     FINDINGS: A single AP semi-upright portable view of the chest is provided for review.  Bilateral   infiltrates are seen.  The findings may represent infectious multifocal pneumonia.  There may be   borderline cardiac enlargement.  No pneumothorax.  No pneumomediastinum.  No pleural effusion.    There is slight pulmonary hypoinflation.  The distal nasogastric tube is projected below the   diaphragm.  There is slight asymmetric elevation of the left diaphragm.     CONCLUSION: Bilateral infiltrates persist.  The findings may represent infectious multifocal   pneumonia.  Interval placement of a nasogastric tube is noted.  Its distal portion is projected   below the diaphragm.                DOTTY ROBLES JR, MD         Electronically Signed and Approved By: DOTTY ROBLES JR, MD on 12/05/2021 at 19:59                     XR Chest 1 View [[id number]] Collected: 12/05/21 1605     Updated: 12/05/21 1608    Narrative:      PROCEDURE: XR CHEST 1 VW     COMPARISON: [facility], CR, XR CHEST 1 VW, 11/18/2021, 11:24.     INDICATIONS: Rule out pneumonia, verify NG tube placement     FINDINGS:   There is no nasogastric tube imaged.  The heart size and pulmonary vessels are normal.  There is   patchy airspace disease in the periphery of the left midlung and in throughout the right lung   consistent with multifocal pneumonia.  The osseous structures are normal for age.     CONCLUSION: Mild diffuse right lung and left midlung alveolar airspace disease consistent with   pneumonia.                  RACHELL DEMPSEY MD         Electronically Signed and  Approved By: RACHELL DEMPSEY MD on 12/05/2021 at 16:05                     CT Head Without Contrast [[id number]] Collected: 12/05/21 1452     Updated: 12/05/21 1455    Narrative:      PROCEDURE: CT HEAD WO CONTRAST     COMPARISON:  [facility], CT, CT HEAD WO CONTRAST, 11/16/2021, 11:33.  INDICATIONS: trauma     PROTOCOL:   Standard imaging protocol performed      RADIATION:   DLP: 1082.2 mGy*cm    MA and/or KV was adjusted to minimize radiation dose.          TECHNIQUE: After obtaining the patient's consent, CT images were obtained without non-ionic   intravenous contrast material.      FINDINGS:   There is mild diffuse generalized atrophy.  There is no mass or mass effect.  There are no abnormal   extra-axial fluid collections.  There are no areas of acute hemorrhage.  There is minimal right   maxillary sinus disease.     CONCLUSION: No acute intracranial abnormality            RACHELL DEMPSEY MD         Electronically Signed and Approved By: RACHELL DEMPSEY MD on 12/05/2021 at 14:52                         Orders (last 24 hrs)      Start     Ordered    12/06/21 0900  sertraline (ZOLOFT) tablet 50 mg  Daily         12/05/21 1822    12/06/21 0700  pantoprazole (PROTONIX) EC tablet 40 mg  Every Morning         12/05/21 1822    12/06/21 0600  CBC Auto Differential  Morning Draw         12/05/21 1822    12/06/21 0600  Comprehensive Metabolic Panel  Morning Draw         12/05/21 1822    12/06/21 0600  TSH  Morning Draw         12/05/21 1822    12/06/21 0600  Procalcitonin  Morning Draw         12/05/21 1822    12/06/21 0600  Phosphorus  Morning Draw         12/05/21 1822    12/06/21 0600  Magnesium  Morning Draw         12/05/21 1822    12/05/21 2200  heparin (porcine) 5000 UNIT/ML injection 5,000 Units  Every 8 Hours Scheduled         12/05/21 1822    12/05/21 2200  POC Glucose 4x Daily AC & at Bedtime  4 Times Daily Before Meals & at Bedtime      Comments: If bedtime blood glucose is greater than 350 mg/dl, call  MD.      12/05/21 1822 12/05/21 2100  sodium chloride 0.9 % flush 10 mL  Every 12 Hours Scheduled         12/05/21 1822    12/05/21 2100  busPIRone (BUSPAR) tablet 5 mg  3 Times Daily         12/05/21 1822 12/05/21 2100  propranolol (INDERAL) tablet 60 mg  2 Times Daily         12/05/21 1822    12/05/21 2100  insulin detemir (LEVEMIR) injection 30 Units  Nightly         12/05/21 1822 12/05/21 2000  Vital Signs  Every 4 Hours       12/05/21 1822 12/05/21 1920  Restraints non-violent or non-self destructive  Calendar Day         12/05/21 1919 12/05/21 1906  XR Chest 1 View  1 Time Imaging         12/05/21 1905 12/05/21 1900  Strict Intake & Output  Every Hour       12/05/21 1822 12/05/21 1900  AZITHROMYCIN 500 MG/250 ML 0.9% NS IVPB (vial-mate)  Daily         12/05/21 1822    12/05/21 1838  Urine Culture - Urine, Urine, Clean Catch  Once         12/05/21 1837    12/05/21 1833  Restraints violent or self-destructive adult (age 18 and older)  Continuous x4 Hours,   Status:  Canceled         12/05/21 1833    12/05/21 1831  Restraints non-violent or non-self destructive  Calendar Day,   Status:  Canceled         12/05/21 1831 12/05/21 1830  cefTRIAXone (ROCEPHIN) IVPB 1 g  Every 24 Hours         12/05/21 1822    12/05/21 1830  amLODIPine (NORVASC) tablet 10 mg  Daily         12/05/21 1822    12/05/21 1830  insulin lispro (humaLOG) injection 0-7 Units  3 Times Daily Before Meals         12/05/21 1822    12/05/21 1823  Inpatient Admission  Once         12/05/21 1822    12/05/21 1823  Telemetry - Maintain IV Access  Continuous,   Status:  Canceled         12/05/21 1822    12/05/21 1823  Continuous Cardiac Monitoring  Continuous,   Status:  Canceled         12/05/21 1822    12/05/21 1823  ACLS Protocol For Life Threatening Dysrhythmias (Unless Code Status Indicates Otherwise)  Continuous         12/05/21 1822 12/05/21 1823  Notify Provider if ACLS Protocol Activated  Until Discontinued          12/05/21 1822    12/05/21 1823  Intake & Output  Every Shift       12/05/21 1822    12/05/21 1823  Weigh Patient  Once         12/05/21 1822    12/05/21 1823  Oxygen Therapy- Nasal Cannula; Titrate for SPO2: 90% - 95%  Continuous         12/05/21 1822    12/05/21 1823  Insert Peripheral IV  Once         12/05/21 1822    12/05/21 1823  Saline Lock & Maintain IV Access  Continuous         12/05/21 1822    12/05/21 1823  Pulse Oximetry, Continuous  Continuous         12/05/21 1822    12/05/21 1823  Cardiac Monitoring  Continuous         12/05/21 1822    12/05/21 1823  Fall Precautions  Continuous         12/05/21 1822    12/05/21 1823  Seizure Precautions  Continuous         12/05/21 1822    12/05/21 1823  Daily Weights  Daily       12/05/21 1822    12/05/21 1823  Diet Regular  Diet Effective Now         12/05/21 1822    12/05/21 1823  Inpatient Gastroenterology Consult  Once        Specialty:  Gastroenterology  Provider:  Trena Coombs MD    12/05/21 1822    12/05/21 1823  Inpatient Nephrology Consult  Once        Specialty:  Nephrology  Provider:  Lisbeth Rubin MD    12/05/21 1822    12/05/21 1823  Sodium, Urine, Random - Urine, Clean Catch  Once         12/05/21 1822    12/05/21 1823  Potassium, Urine, Random - Urine, Clean Catch  Once         12/05/21 1822    12/05/21 1823  Chloride, Urine, Random - Urine, Clean Catch  Once         12/05/21 1822    12/05/21 1823  Creatinine, Urine, Random - Urine, Clean Catch  Once         12/05/21 1822    12/05/21 1823  US Abdomen Complete  1 Time Imaging         12/05/21 1822    12/05/21 1823  Suicide Precautions  Continuous         12/05/21 1822    12/05/21 1823  Do NOT Hold Basal or Correction Scale Insulin When Patient is NPO, Hold Scheduled Mealtime (Bolus) Insulin if NPO  Continuous         12/05/21 1822    12/05/21 1823  Follow [facility] Hypoglycemia Standing Orders For Blood Glucose Less Than 70 mg/dL  Until Discontinued        Comments: ALERT PATIENT - NOT NPO  & CAN SAFELY SWALLOW  Administer 4 oz Fruit Juice OR 4 oz Regular Soda OR 8 oz Milk OR 15-30 grams (1 tube) of Glucose Gel.  Recheck Blood Glucose Approximately 15 Minutes After Ingestion, Repeat Treatment & Continue to Recheck Blood Sugar Approximately Every 15 Minutes Until Blood Glucose is 70 or Higher.  Once Blood Glucose is 70 or Higher & if It Will Be More Than 60 Minutes Until Next Meal, Provide Appropriate Snack (Including Carbohydrate Food) Based on Meal Plan Order. Give Meal Tray As Soon As Possible.    PATIENT HAS IV ACCESS - UNRESPONSIVE, NPO OR UNABLE TO SAFELY SWALLOW  Administer 25g (250ml) D10W over 15 minutes using infusion pump.  Recheck Blood Glucose Approximately 15 Minutes After Administration, if Blood Glucose Remains Less Than 70, Repeat Treatment   Recheck Blood Glucose Approximately 15 Minutes After 2nd Administration, if Blood Glucose Remains Less Than 70 After 2nd Dose of D10W, Contact Provider for Further Treatment Orders & Consider Adding IVF With D5W for Maintenance    PATIENT WITHOUT IV ACCESS - UNRESPONSIVE, NPO OR UNABLE TO SAFELY SWALLOW  Administer 1mg Glucagon SQ & Establish IV Access.  Turn Patient on Side - Nausea / Vomiting May Occur.  Recheck Blood Glucose Approximately 15 Minutes After Administration.  If Blood Glucose Remains Less Than 70, Administer 25g D10W (250ml) over 15 minutes using infusion pump.  Recheck Blood Glucose Approximately 15 Minutes After Administration of D10W, if Blood Glucose Remains Less Than 70, Contact Provider for Further Treatment Orders & Consider Adding IVF With D5 for Maintenance    Document Event & Patient Response to Interventions in EMR, Document Medications on MAR  Notify Provider if Hypoglycemia Treatment Needed    12/05/21 1822    12/05/21 1823  Respiratory Culture - Sputum, Cough  Once         12/05/21 1822 12/05/21 1822  Telemetry - Pulse Oximetry  Continuous PRN,   Status:  Canceled      Comments: If Patient Develops Unresponsiveness,  Acute Dyspnea, Cyanosis or Suspected Hypoxemia Start Continuous Pulse Ox Monitoring, Apply Oxygen & Notify Provider    12/05/21 1822    12/05/21 1822  sodium chloride 0.9 % flush 10 mL  As Needed         12/05/21 1822    12/05/21 1822  Oxygen Therapy- Nasal Cannula; Titrate for SPO2: 90% - 95%  Continuous PRN,   Status:  Canceled      Comments: If Patient Develops Unresponsiveness, Acute Dyspnea, Cyanosis or Suspected Hypoxemia Start Continuous Pulse Ox Monitoring, Apply Oxygen & Notify Provider    12/05/21 1822    12/05/21 1822  nitroglycerin (NITROSTAT) SL tablet 0.4 mg  Every 5 Minutes PRN         12/05/21 1822    12/05/21 1822  lactulose in sterile water enema 300 mL  Every 6 Hours PRN         12/05/21 1822    12/05/21 1822  dextrose (GLUTOSE) oral gel 15 g  Every 15 Minutes PRN         12/05/21 1822    12/05/21 1822  dextrose 10 % infusion  Every 15 Minutes PRN         12/05/21 1822    12/05/21 1822  glucagon (human recombinant) (GLUCAGEN DIAGNOSTIC) injection 1 mg  Every 15 Minutes PRN         12/05/21 1822    12/05/21 1822  albuterol sulfate HFA (PROVENTIL HFA;VENTOLIN HFA;PROAIR HFA) inhaler 2 puff  Every 4 Hours PRN         12/05/21 1822    12/05/21 1820  Urinalysis, Microscopic Only - Urine, Clean Catch  Once         12/05/21 1819    12/05/21 1800  cefTRIAXone (ROCEPHIN) IVPB 1 g  Once         12/05/21 1744    12/05/21 1800  AZITHROMYCIN 500 MG/250 ML 0.9% NS IVPB (vial-mate)  Once         12/05/21 1744    12/05/21 1744  Blood Culture - Blood, Blood, Venous Line  Once         12/05/21 1744    12/05/21 1744  Blood Culture - Blood, Arm, Left  Once        Comments: 30 minutes after first collection, or from a different site      12/05/21 1744    12/05/21 1743  Dr. Campos request we replace the NG tube prior to admission.  Nursing Communication  Once        Comments: Dr. Campos request we replace the NG tube prior to admission.    12/05/21 1742 12/05/21 1742  Urinalysis With Culture If Indicated - Urine,  Clean Catch  Once         12/05/21 1741    12/05/21 1724  Hospitalist (on-call MD unless specified)  Once        Specialty:  Hospitalist  Provider:  Patrice Campos MD    12/05/21 1724    12/05/21 1715  haloperidol lactate (HALDOL) injection 5 mg  Once         12/05/21 1705    12/05/21 1515  lactulose (CHRONULAC) 10 GM/15ML solution 20 g  Daily         12/05/21 1459    12/05/21 1515  sodium chloride 0.9 % bolus 500 mL  Once         12/05/21 1459    12/05/21 1501  XR Chest 1 View  1 Time Imaging        Comments: We will call you once NG tube placed.  Please shoot x-ray low enough to include stomach.    12/05/21 1500    12/05/21 1458  Nasogastric tube insertion  Once         12/05/21 1459    12/05/21 1430  Midazolam HCl (PF) (VERSED) injection 2 mg  Once         12/05/21 1426    12/05/21 1411  CT Head Without Contrast  1 Time Imaging         12/05/21 1410    12/05/21 1411  CK  STAT         12/05/21 1410    12/05/21 1406  BNP  STAT         12/05/21 1405    12/05/21 1350  COVID-19,CEPHEID/ANNE MARIE/BDMAX,COR/CATRACHITA/PAD/PAO IN-HOUSE(OR EMERGENT/ADD-ON),NP SWAB IN TRANSPORT MEDIA 3-4 HR TAT, RT-PCR - Swab, Nasopharynx  Once         12/05/21 1349    12/05/21 1348  POC Glucose Once  PROCEDURE ONCE         12/05/21 1346    12/05/21 1323  Ammonia  Once         12/05/21 1322    12/05/21 1322  Suicide precautions  Continuous         12/05/21 1322    12/05/21 1322  Cardiac Monitoring  Per Hospital Policy,   Status:  Canceled         12/05/21 1322    12/05/21 1322  Continuous Pulse Oximetry  Continuous,   Status:  Canceled         12/05/21 1322    12/05/21 1322  Vital signs  Per Hospital Policy         12/05/21 1322    12/05/21 1322  NPO Diet  Diet Effective Now,   Status:  Canceled         12/05/21 1322    12/05/21 1322  Undress and Gown  Once         12/05/21 1322    12/05/21 1322  POC Glucose Once  Once         12/05/21 1322    12/05/21 1322  Insert peripheral IV  Once         12/05/21 1322    12/05/21 1322  [place] Draw  Once          12/05/21 1322    12/05/21 1322  CBC & Differential  Once         12/05/21 1322    12/05/21 1322  Comprehensive Metabolic Panel  Once         12/05/21 1322    12/05/21 1322  Acetaminophen Level  Once         12/05/21 1322    12/05/21 1322  Ethanol  Once         12/05/21 1322    12/05/21 1322  Salicylate Level  Once         12/05/21 1322    12/05/21 1322  Urine Drug Screen - Urine, Catheter  Once         12/05/21 1322    12/05/21 1322  Green Top (Gel)  PROCEDURE ONCE         12/05/21 1322    12/05/21 1322  Lavender Top  PROCEDURE ONCE         12/05/21 1322    12/05/21 1322  Gold Top - SST  PROCEDURE ONCE         12/05/21 1322    12/05/21 1322  Light Blue Top  PROCEDURE ONCE         12/05/21 1322    12/05/21 1322  CBC Auto Differential  PROCEDURE ONCE         12/05/21 1322    12/05/21 1321  sodium chloride 0.9 % flush 10 mL  As Needed         12/05/21 1322    Unscheduled  ECG 12 Lead  As Needed      Comments: Nurse to Release if Patient Expericences Acute Chest Pain or Dysrhythmias    12/05/21 1822    Unscheduled  Potassium  As Needed      Comments: For Ventricular Arrhythmias      12/05/21 1822    Unscheduled  Magnesium  As Needed      Comments: For Ventricular Arrhythmias      12/05/21 1822    Unscheduled  Troponin  As Needed      Comments: For Chest Pain      12/05/21 1822    Unscheduled  Digoxin Level  As Needed      Comments: For Atrial Arrhythmias      12/05/21 1822    Unscheduled  Blood Gas, Arterial -With Co-Ox Panel: Yes  As Needed      Comments: Per O2 PolicyNotify Physician      12/05/21 1822    --  oxyCODONE-acetaminophen (PERCOCET)  MG per tablet  Every 8 Hours PRN         12/05/21 1400    --  omeprazole (priLOSEC) 20 MG capsule  Daily         12/05/21 1400    --  sertraline (ZOLOFT) 50 MG tablet  Daily         12/05/21 1400    --  meclizine (ANTIVERT) 25 MG tablet  3 Times Daily PRN         12/05/21 1400    --  ketorolac (TORADOL) 10 MG tablet  Every 6 Hours PRN         12/05/21 1400    --  baclofen  (LIORESAL) 10 MG tablet  3 Times Daily         12/05/21 1400

## 2021-12-07 ENCOUNTER — APPOINTMENT (OUTPATIENT)
Dept: DIABETES SERVICES | Facility: HOSPITAL | Age: 56
End: 2021-12-07

## 2021-12-07 LAB
ALBUMIN SERPL-MCNC: 2.2 G/DL (ref 3.5–5.2)
ALBUMIN/GLOB SERPL: 0.8 G/DL
ALP SERPL-CCNC: 131 U/L (ref 39–117)
ALT SERPL W P-5'-P-CCNC: 11 U/L (ref 1–41)
AMMONIA BLD-SCNC: 18 UMOL/L (ref 16–60)
ANION GAP SERPL CALCULATED.3IONS-SCNC: 10.7 MMOL/L (ref 5–15)
AST SERPL-CCNC: 39 U/L (ref 1–40)
BASOPHILS # BLD AUTO: 0.07 10*3/MM3 (ref 0–0.2)
BASOPHILS NFR BLD AUTO: 0.7 % (ref 0–1.5)
BILIRUB SERPL-MCNC: 2.6 MG/DL (ref 0–1.2)
BUN SERPL-MCNC: 22 MG/DL (ref 6–20)
BUN/CREAT SERPL: 20.4 (ref 7–25)
CALCIUM SPEC-SCNC: 7.9 MG/DL (ref 8.6–10.5)
CHLORIDE SERPL-SCNC: 108 MMOL/L (ref 98–107)
CO2 SERPL-SCNC: 22.3 MMOL/L (ref 22–29)
CREAT SERPL-MCNC: 1.08 MG/DL (ref 0.76–1.27)
DEPRECATED RDW RBC AUTO: 51.4 FL (ref 37–54)
EOSINOPHIL # BLD AUTO: 0.07 10*3/MM3 (ref 0–0.4)
EOSINOPHIL NFR BLD AUTO: 0.7 % (ref 0.3–6.2)
ERYTHROCYTE [DISTWIDTH] IN BLOOD BY AUTOMATED COUNT: 15.8 % (ref 12.3–15.4)
GFR SERPL CREATININE-BSD FRML MDRD: 71 ML/MIN/1.73
GLOBULIN UR ELPH-MCNC: 2.9 GM/DL
GLUCOSE BLDC GLUCOMTR-MCNC: 115 MG/DL (ref 70–99)
GLUCOSE BLDC GLUCOMTR-MCNC: 119 MG/DL (ref 70–99)
GLUCOSE BLDC GLUCOMTR-MCNC: 140 MG/DL (ref 70–99)
GLUCOSE BLDC GLUCOMTR-MCNC: 94 MG/DL (ref 70–99)
GLUCOSE SERPL-MCNC: 105 MG/DL (ref 65–99)
HCT VFR BLD AUTO: 27.6 % (ref 37.5–51)
HGB BLD-MCNC: 9 G/DL (ref 13–17.7)
IMM GRANULOCYTES # BLD AUTO: 0.33 10*3/MM3 (ref 0–0.05)
IMM GRANULOCYTES NFR BLD AUTO: 3.5 % (ref 0–0.5)
INR PPP: 1.73 (ref 2–3)
LYMPHOCYTES # BLD AUTO: 1.27 10*3/MM3 (ref 0.7–3.1)
LYMPHOCYTES NFR BLD AUTO: 13.5 % (ref 19.6–45.3)
MCH RBC QN AUTO: 29.1 PG (ref 26.6–33)
MCHC RBC AUTO-ENTMCNC: 32.6 G/DL (ref 31.5–35.7)
MCV RBC AUTO: 89.3 FL (ref 79–97)
MONOCYTES # BLD AUTO: 0.77 10*3/MM3 (ref 0.1–0.9)
MONOCYTES NFR BLD AUTO: 8.2 % (ref 5–12)
NEUTROPHILS NFR BLD AUTO: 6.93 10*3/MM3 (ref 1.7–7)
NEUTROPHILS NFR BLD AUTO: 73.4 % (ref 42.7–76)
NRBC BLD AUTO-RTO: 0 /100 WBC (ref 0–0.2)
PLATELET # BLD AUTO: 91 10*3/MM3 (ref 140–450)
PMV BLD AUTO: 11.1 FL (ref 6–12)
POTASSIUM SERPL-SCNC: 3.7 MMOL/L (ref 3.5–5.2)
PROT SERPL-MCNC: 5.1 G/DL (ref 6–8.5)
PROTHROMBIN TIME: 17 SECONDS (ref 9.4–12)
RBC # BLD AUTO: 3.09 10*6/MM3 (ref 4.14–5.8)
SODIUM SERPL-SCNC: 141 MMOL/L (ref 136–145)
WBC NRBC COR # BLD: 9.44 10*3/MM3 (ref 3.4–10.8)

## 2021-12-07 PROCEDURE — 25010000002 AZITHROMYCIN PER 500 MG: Performed by: STUDENT IN AN ORGANIZED HEALTH CARE EDUCATION/TRAINING PROGRAM

## 2021-12-07 PROCEDURE — 85025 COMPLETE CBC W/AUTO DIFF WBC: CPT | Performed by: INTERNAL MEDICINE

## 2021-12-07 PROCEDURE — 63710000001 INSULIN DETEMIR PER 5 UNITS: Performed by: INTERNAL MEDICINE

## 2021-12-07 PROCEDURE — 80053 COMPREHEN METABOLIC PANEL: CPT | Performed by: INTERNAL MEDICINE

## 2021-12-07 PROCEDURE — 85610 PROTHROMBIN TIME: CPT | Performed by: INTERNAL MEDICINE

## 2021-12-07 PROCEDURE — 25010000002 HEPARIN (PORCINE) PER 1000 UNITS: Performed by: STUDENT IN AN ORGANIZED HEALTH CARE EDUCATION/TRAINING PROGRAM

## 2021-12-07 PROCEDURE — 25010000002 CEFTRIAXONE PER 250 MG: Performed by: STUDENT IN AN ORGANIZED HEALTH CARE EDUCATION/TRAINING PROGRAM

## 2021-12-07 PROCEDURE — 82140 ASSAY OF AMMONIA: CPT | Performed by: INTERNAL MEDICINE

## 2021-12-07 PROCEDURE — 25010000002 MORPHINE PER 10 MG: Performed by: GENERAL PRACTICE

## 2021-12-07 PROCEDURE — 82962 GLUCOSE BLOOD TEST: CPT

## 2021-12-07 PROCEDURE — 99233 SBSQ HOSP IP/OBS HIGH 50: CPT | Performed by: INTERNAL MEDICINE

## 2021-12-07 RX ORDER — SPIRONOLACTONE 25 MG/1
100 TABLET ORAL
Status: DISCONTINUED | OUTPATIENT
Start: 2021-12-07 | End: 2021-12-11 | Stop reason: HOSPADM

## 2021-12-07 RX ORDER — FUROSEMIDE 40 MG/1
80 TABLET ORAL
Status: DISCONTINUED | OUTPATIENT
Start: 2021-12-07 | End: 2021-12-10

## 2021-12-07 RX ORDER — PANTOPRAZOLE SODIUM 40 MG/10ML
40 INJECTION, POWDER, LYOPHILIZED, FOR SOLUTION INTRAVENOUS
Status: DISCONTINUED | OUTPATIENT
Start: 2021-12-07 | End: 2021-12-11 | Stop reason: HOSPADM

## 2021-12-07 RX ORDER — MORPHINE SULFATE 2 MG/ML
1 INJECTION, SOLUTION INTRAMUSCULAR; INTRAVENOUS EVERY 4 HOURS PRN
Status: DISCONTINUED | OUTPATIENT
Start: 2021-12-07 | End: 2021-12-11 | Stop reason: HOSPADM

## 2021-12-07 RX ADMIN — LACTULOSE 30 G: 20 SOLUTION ORAL at 10:45

## 2021-12-07 RX ADMIN — PROPRANOLOL HYDROCHLORIDE 60 MG: 40 TABLET ORAL at 21:34

## 2021-12-07 RX ADMIN — MORPHINE SULFATE 1 MG: 2 INJECTION, SOLUTION INTRAMUSCULAR; INTRAVENOUS at 23:39

## 2021-12-07 RX ADMIN — HEPARIN SODIUM 5000 UNITS: 5000 INJECTION, SOLUTION INTRAVENOUS; SUBCUTANEOUS at 13:48

## 2021-12-07 RX ADMIN — MORPHINE SULFATE 1 MG: 2 INJECTION, SOLUTION INTRAMUSCULAR; INTRAVENOUS at 15:07

## 2021-12-07 RX ADMIN — MORPHINE SULFATE 1 MG: 2 INJECTION, SOLUTION INTRAMUSCULAR; INTRAVENOUS at 01:53

## 2021-12-07 RX ADMIN — PROPRANOLOL HYDROCHLORIDE 60 MG: 40 TABLET ORAL at 10:45

## 2021-12-07 RX ADMIN — BUSPIRONE HYDROCHLORIDE 5 MG: 5 TABLET ORAL at 10:45

## 2021-12-07 RX ADMIN — FUROSEMIDE 80 MG: 40 TABLET ORAL at 17:33

## 2021-12-07 RX ADMIN — SODIUM CHLORIDE, PRESERVATIVE FREE 10 ML: 5 INJECTION INTRAVENOUS at 10:46

## 2021-12-07 RX ADMIN — BUSPIRONE HYDROCHLORIDE 5 MG: 5 TABLET ORAL at 15:05

## 2021-12-07 RX ADMIN — CEFTRIAXONE SODIUM 1 G: 1 INJECTION, SOLUTION INTRAVENOUS at 17:33

## 2021-12-07 RX ADMIN — AMLODIPINE BESYLATE 10 MG: 5 TABLET ORAL at 10:46

## 2021-12-07 RX ADMIN — AZITHROMYCIN DIHYDRATE 500 MG: 500 INJECTION, POWDER, LYOPHILIZED, FOR SOLUTION INTRAVENOUS at 11:19

## 2021-12-07 RX ADMIN — INSULIN DETEMIR 10 UNITS: 100 INJECTION, SOLUTION SUBCUTANEOUS at 21:43

## 2021-12-07 RX ADMIN — HEPARIN SODIUM 5000 UNITS: 5000 INJECTION, SOLUTION INTRAVENOUS; SUBCUTANEOUS at 21:42

## 2021-12-07 RX ADMIN — LACTULOSE 30 G: 20 SOLUTION ORAL at 15:05

## 2021-12-07 RX ADMIN — HEPARIN SODIUM 5000 UNITS: 5000 INJECTION, SOLUTION INTRAVENOUS; SUBCUTANEOUS at 05:33

## 2021-12-07 RX ADMIN — SODIUM CHLORIDE, PRESERVATIVE FREE 10 ML: 5 INJECTION INTRAVENOUS at 21:37

## 2021-12-07 RX ADMIN — MORPHINE SULFATE 1 MG: 2 INJECTION, SOLUTION INTRAMUSCULAR; INTRAVENOUS at 19:37

## 2021-12-07 RX ADMIN — MORPHINE SULFATE 1 MG: 2 INJECTION, SOLUTION INTRAMUSCULAR; INTRAVENOUS at 06:00

## 2021-12-07 RX ADMIN — MORPHINE SULFATE 1 MG: 2 INJECTION, SOLUTION INTRAMUSCULAR; INTRAVENOUS at 10:49

## 2021-12-07 RX ADMIN — LACTULOSE 30 G: 20 SOLUTION ORAL at 21:35

## 2021-12-07 RX ADMIN — RIFAXIMIN 550 MG: 550 TABLET ORAL at 21:37

## 2021-12-07 RX ADMIN — BUSPIRONE HYDROCHLORIDE 5 MG: 5 TABLET ORAL at 21:36

## 2021-12-07 RX ADMIN — SPIRONOLACTONE 100 MG: 25 TABLET ORAL at 17:33

## 2021-12-07 RX ADMIN — RIFAXIMIN 550 MG: 550 TABLET ORAL at 10:45

## 2021-12-07 RX ADMIN — PANTOPRAZOLE SODIUM 40 MG: 40 INJECTION, POWDER, FOR SOLUTION INTRAVENOUS at 05:33

## 2021-12-07 NOTE — PLAN OF CARE
Goal Outcome Evaluation:  Plan of Care Reviewed With: patient        Progress: improving  Outcome Summary: Mentation has improved this shift. Patient has been more talkative. Complaints of back pain medicated per MAR. Several incontinent episodes with lactulose on board. VSS. Close Watch at bedside. Bilateral wrist restraints still in place. JACOB,RN

## 2021-12-07 NOTE — PLAN OF CARE
Problem: Restraint, Nonbehavioral (Nonviolent)  Goal: Personal Dignity and Safety Maintained  Intervention: Protect Dignity, Rights, and Personal Wellbeing  Recent Flowsheet Documentation  Taken 12/7/2021 0745 by Yusef Chatman RN  Trust Relationship/Rapport:   care explained   choices provided   emotional support provided   thoughts/feelings acknowledged   reassurance provided   questions encouraged   questions answered   empathic listening provided   Goal Outcome Evaluation:  Plan of Care Reviewed With: patient        Progress: improving  Outcome Summary: Patient mentation has improved this shift, pt is now alert and oriented. VSS. Pt is out of restraints with no complications. NG tube and urinary catheter have been removed with no complications as well. Will continue to monitor.

## 2021-12-07 NOTE — CONSULTS
[facility]  Initial Inpatient Consult Note    Referring Provider: Hospitalist    Reason for Consultation: Encephalopathy    Subjective     History of present illness:    56 y.o. male Ruiz related cirrhosis, ascites, and now presents with confusion.  He has a history of cirrhosis likely related to nonalcoholic fatty liver disease.  He has had outpatient paracentesis is most being about 1 month ago.  Last colonoscopy was in February 2020 showing hemorrhoids and a tubular adenoma removed.  Last colonoscopy was January 2019 showing no evidence of varices and no esophagitis.  Patient reportedly had suicidal ideation and was combative in the emergency department.  His ammonia level was elevated at 131 and therefore NG tube was placed.  He is currently on antibiotics for suspected pneumonia as well as an NG tube placed for lactulose administration.  He is able to answer who he is and where he is at this time.  Unable to give additional history    Past Medical History:  Past Medical History:   Diagnosis Date   • Asthma    • Cirrhosis (HCC)    • Colon polyps    • Diabetes mellitus (HCC)    • Hypertension    • Liver disease    • Reflux esophagitis    • Renal disorder    • Sleep apnea      Past Surgical History:  Past Surgical History:   Procedure Laterality Date   • COLONOSCOPY  2018, 2020   • ENDOSCOPY  2019   • FRACTURE SURGERY     • LEG SURGERY     • PELVIC FRACTURE SURGERY     • UPPER GASTROINTESTINAL ENDOSCOPY        Social History:   Social History     Tobacco Use   • Smoking status: Never Smoker   • Smokeless tobacco: Never Used   Substance Use Topics   • Alcohol use: Never      Family History:  Family History   Problem Relation Age of Onset   • Stomach cancer Sister    • Lung cancer Father    • Colon cancer Neg Hx        Home Meds:  Medications Prior to Admission   Medication Sig Dispense Refill Last Dose   • cyclobenzaprine (FLEXERIL) 10 MG tablet Take 1 tablet by mouth 3 (Three) Times a Day  As Needed for Muscle Spasms. 12 tablet 0    • albuterol sulfate  (90 Base) MCG/ACT inhaler Inhale 2 puffs Every 4 (Four) Hours As Needed for Wheezing. 90 g 0 Unknown at Unknown time   • Alcohol Swabs (Alcohol Prep) pads 1 pad 5 (Five) Times a Day. 200 each 3 Unknown at Unknown time   • amLODIPine (NORVASC) 10 MG tablet Take 1 tablet by mouth Daily. 30 tablet 0 Unknown at Unknown time   • baclofen (LIORESAL) 10 MG tablet Take 10 mg by mouth 3 (Three) Times a Day.   Unknown at Unknown time   • busPIRone (BUSPAR) 5 MG tablet Take 1 tablet by mouth 3 (Three) Times a Day. 30 tablet 0 Unknown at Unknown time   • furosemide (LASIX) 40 MG tablet Take 2 tablets by mouth 3 (Three) Times a Day for 30 days. (Patient taking differently: Take 40 mg by mouth 3 (Three) Times a Day.) 180 tablet 0 Unknown at Unknown time   • glimepiride (Amaryl) 1 MG tablet Take 1 tablet by mouth Every Morning Before Breakfast for 30 days. 30 tablet 0 Unknown at Unknown time   • Glucose Blood (Blood Glucose Test) strip 1 strip by In Vitro route 5 (Five) Times a Day. 100 each 3 Unknown at Unknown time   • Insulin Glargine (LANTUS SOLOSTAR) 100 UNIT/ML injection pen Inject 66 Units under the skin into the appropriate area as directed Daily for 30 days. 15 pen 3 Unknown at Unknown time   • ketorolac (TORADOL) 10 MG tablet Take 10 mg by mouth Every 6 (Six) Hours As Needed for Moderate Pain .   Unknown at Unknown time   • lactulose (CHRONULAC) 10 GM/15ML solution Take 30 mL by mouth 2 (Two) Times a Day. 473 mL 2 Unknown at Unknown time   • Lancets (freestyle) lancets 1 each by Other route As Needed (glucose check). 100 each 3 Unknown at Unknown time   • meclizine (ANTIVERT) 25 MG tablet Take 25 mg by mouth 3 (Three) Times a Day As Needed for Dizziness.   Unknown at Unknown time   • Morphine (MSIR) 15 MG tablet Take 1 tablet by mouth Every 8 (Eight) Hours As Needed for Severe Pain . 21 tablet 0 Unknown at Unknown time   • omeprazole (priLOSEC) 20 MG  capsule Take 20 mg by mouth Daily.   Unknown at Unknown time   • ondansetron ODT (ZOFRAN-ODT) 4 MG disintegrating tablet Place 1 tablet under the tongue 4 (Four) Times a Day As Needed for Nausea or Vomiting. 30 tablet 0    • oxyCODONE-acetaminophen (PERCOCET)  MG per tablet Take 1 tablet by mouth Every 8 (Eight) Hours As Needed for Moderate Pain . bottle empty   Unknown at Unknown time   • Potassium 99 MG tablet Apply 99 mg to the mouth or throat 2 (Two) Times a Day. 180 each 1    • propranolol (INDERAL) 20 MG tablet Take 3 tablets by mouth 2 (Two) Times a Day for 30 days. (Patient taking differently: Take 40 mg by mouth 2 (Two) Times a Day.) 180 tablet 0 Unknown at Unknown time   • sertraline (ZOLOFT) 50 MG tablet Take 50 mg by mouth Daily.   Unknown at Unknown time   • spironolactone (ALDACTONE) 100 MG tablet Take 2 tablets by mouth Daily. 60 tablet 3 Unknown at Unknown time     Current Meds:   !Patient Home Medications Stored in Pharmacy, , Does not apply, BID  amLODIPine, 10 mg, Oral, Daily  azithromycin, 500 mg, Intravenous, Daily  busPIRone, 5 mg, Oral, TID  cefTRIAXone, 1 g, Intravenous, Q24H  heparin (porcine), 5,000 Units, Subcutaneous, Q8H  insulin detemir, 10 Units, Subcutaneous, Nightly  insulin lispro, 0-7 Units, Subcutaneous, TID AC  lactulose, 30 g, Oral, TID  pantoprazole, 40 mg, Oral, QAM  propranolol, 60 mg, Oral, BID  sodium chloride, 10 mL, Intravenous, Q12H      Allergies:  No Known Allergies  Review of Systems  Pertinent items are noted in HPI, all other systems reviewed and negative         Vital Signs  Temp:  [98.1 °F (36.7 °C)-98.8 °F (37.1 °C)] 98.1 °F (36.7 °C)  Heart Rate:  [] 78  Resp:  [16-24] 20  BP: (146-186)/(73-94) 166/73  Physical Exam:  General Appearance:    Alert, cooperative, in no acute distress   Head:    Normocephalic, without obvious abnormality, atraumatic   Eyes:          conjunctivae and sclerae normal, no   icterus   Throat:   no thrush, oral mucosa moist    Neck:   Supple, no adenopathy   Lungs:     Clear to auscultation bilaterally    Heart:    Regular rhythm and normal rate    Chest Wall:    No abnormalities observed   Abdomen:     Soft, nondistended, nontender; normal bowel sounds   Extremities:   no edema, no redness   Skin:   No bruising or rash   Psychiatric:  normal mood and insight     Results Review:  []  Laboratory   []  Radiology  []  Pathology      I reviewed the patient's new clinical results.    Results from last 7 days   Lab Units 12/06/21  0611 12/05/21  1325   WBC 10*3/mm3 9.45 6.35   HEMOGLOBIN g/dL 9.1* 8.7*   HEMATOCRIT % 25.7* 26.0*   PLATELETS 10*3/mm3 100* 63*     Results from last 7 days   Lab Units 12/06/21  0611 12/05/21  1325   SODIUM mmol/L 138 137   POTASSIUM mmol/L 3.8 4.1   CHLORIDE mmol/L 105 102   CO2 mmol/L 21.8* 18.7*   BUN mg/dL 27* 30*   CREATININE mg/dL 1.55* 2.08*   CALCIUM mg/dL 8.5* 8.1*   BILIRUBIN mg/dL 2.3* 2.2*   ALK PHOS U/L 141* 157*   ALT (SGPT) U/L 11 11   AST (SGOT) U/L 33 28   GLUCOSE mg/dL 103* 243*         Lab Results   Lab Value Date/Time    LIPASE 20 08/03/2021 1206    LIPASE 28 06/01/2021 1622       Radiology:  IR Paracentesis With Imaging   Final Result      XR Chest 1 View   Final Result      XR Chest 1 View   Final Result      CT Head Without Contrast   Final Result      US Abdomen Limited    (Results Pending)        Assessment/Plan     Patient Active Problem List   Diagnosis   • Arthritis, senescent   • Body mass index (BMI) 40.0-44.9, adult (HCC)   • Colon polyps   • Cirrhosis (HCC)   • High blood pressure   • Hyperlipidemia   • Pancytopenia (HCC)   • Sleep apnea   • Systolic congestive heart failure (HCC)   • Bilateral lower extremity edema   • Chronic cystitis   • Chronic low back pain   • Diabetes mellitus without complication (HCC)   • Acid reflux   • Acetabular fracture (HCC)   • Gross hematuria   • Hesitancy of micturition   • Pre-operative cardiovascular examination   • Gastrointestinal hemorrhage  associated with peptic ulcer   • Anxiety   • Hepatic encephalopathy (HCC)        Plan:  Patient is encephalopathic with ascites in the setting of suspected underlying infection.  He does not.  To do any evidence of GI bleeding, and his hemoglobin remained stable around 9.1 with platelets of 100,000.  Attempted paracentesis performed due to patient noncompliance.  Will therefore continue empiric antibiotics with lactulose administration.  I will add Xifaxan to lactulose and once he is clinically stable we can pursue paracentesis.      I discussed the patients findings and my recommendations with patient.    Karlos Roblero MD

## 2021-12-07 NOTE — PROGRESS NOTES
" [facility]   Hospitalist Progress Note  Date: 2021  Patient Name: Nic Nicolas  : 1965  MRN: [id number]  Date of admission: 2021      Subjective   Subjective     Chief Complaint: SI and encphephalopathy    Summary: 56-year-old male with cirrhosis and other medical problems presented to the ED after reportedly telling his neighbor that he wanted to \"blow his brains out.\"  Per the admitting hospitalist, he was not able to get additional history as the patient was combative and spitting on nurses and physicians.  Ammonia was elevated to 131.  He was given an NG tube for lactulose but then pulled it out.  He was started on Rocephin and Zithromax for right lower lobe pneumonia.  He had worsening of his renal failure and nephrology was consulted.  He was admitted to the medicine service.    Interval Followup: Patient awake alert oriented resting comfortably in bed wants Zamora catheter removed once NG removed mental status back at baseline denies any suicidal ideations whatsoever.  Review of Systems  All systems reviewed negative except the following: Patient complains of pain from NG tube and Zamora catheter    Objective   Objective     Vitals:   Temp:  [97.5 °F (36.4 °C)-98.1 °F (36.7 °C)] 97.7 °F (36.5 °C)  Heart Rate:  [66-78] 66  Resp:  [20] 20  BP: (148-166)/(66-78) 162/78  Physical Exam    Constitutional: Awake alert oriented no acute distress   Eyes: atraumatic; pupils appear equal   HENT: NCAT, mucous membranes moist   Neck: Supple, no lad   Respiratory: no resp distress; no stridor   Cardiovascular: no m, reg rhythm   Gastrointestinal: Positive bowel sounds, soft, nontender, mildly distended   Musculoskeletal: No bilateral ankle edema, no clubbing or cyanosis to extremities   Psychiatric: Mood and affect normal no suicidal ideations   neurologic: No focal deficits awake alert oriented   skin: No rashes     Result Review    Result Review:  I have personally reviewed the results from the " time of this admission to 12/7/2021 12:35 EST and agree with these findings:  [x]  Laboratory  [x]  Microbiology  [x]  Radiology  []  EKG/Telemetry   []  Cardiology/Vascular   []  Pathology  []  Old records  []  Other:    Assessment/Plan   Assessment / Plan     Assessment:  Acute hepatic encephalopathy  Suicidal comments suspect related to hepatic encephalopathy currently without any suicidal ideations  IVELISSE resolving  Right lower lobe pneumonia  Hepatic cirrhosis  Diabetes  Coagulopathy  Thrombocytopenia    Plan:  Discontinue NG advance diet  Discontinue Zamora catheter  Continue lactulose and rifaximin goal of 2-3 soft bowel movements daily  IR for paracentesis today unable to do yesterday as patient was agitated  Continue Rocephin and Zithromax   GI consulted appreciate recommendations  Psychiatry consultation appreciated  Nephrology consultation appreciated  Renal function improved may need to resume diuretics at lower dose  Discussed plan with RN.    DVT prophylaxis:  Medical DVT prophylaxis orders are present.    CODE STATUS:        Electronically signed by SOLEDAD Oliva, 12/07/21, 12:41 PM EST.    Attending Note:  I have seen and examined the patient and agree with the above information from Kenny Woodard PA-C and have outlined plan of care.  56-year-old male with cirrhosis presented to the ED with significant hepatic encephalopathy.  Ammonia level was elevated to 131.  He required tats and a facemask as he was combative and spitting.  He had an NG tube replaced to get lactulose.  Fortunately he has had a nice improvement.  Ammonia level has normalized and his mental status is dramatically better.  Today he is awake alert and oriented.  Still has some abdominal distention but otherwise no new complaints.  Continue lactulose.  May need a therapeutic paracentesis tomorrow.  Continue current antibiotics.  Probably need to rule out SBP if we can get a paracentesis, otherwise can continue a short course of  empiric antibiotics.  Hopefully can discharge home within 24 to 48 hours.  Electronically signed by Dominick Davila MD, 12/07/21, 7:57 PM EST.

## 2021-12-07 NOTE — CONSULTS
" [facility]   PSYCHIATRIC CONSULTATION    Patient Name: Nic Nicolas  : 1965  MRN: [id number]  Primary Care Physician:  Britney Echevarria APRN  Date of admission: 2021    Referring Provider: Dr. Dominick Davila  Reason for Consultation: Depression with suicidal ideations    Subjective   Subjective     Chief Complaint: \"My ammonia level and brain\"    HPI:     Nic Nicolas is a 56 y.o. male reports he has a long history of cirrhosis.  Reports that his ammonia gets high and his brain does not function correctly.  Patient reports feeling much better today.  Patient is calm and cooperative throughout the exam.    Patient is not sure why psychiatry was asked to see how but states, \"I guess they want to make sure not knots.\"  Patient denies depression at this time.  Patient quite adamant that he is having no suicidal ideations.  Patient reports that his mood overall is fairly good.  Patient does not recall a specific make any suicidal thoughts but states he could have because his ammonia levels were high.  Patient was noted to have hepatic encephalopathy at time of admission.  He is calm and cooperative at this time.    Patient reports he gets treatment for his anxiety from his primary care provider and the treatment is adequate and very good.  Patient is calm and cooperative today.  There is no acute agitation.  Sitter noted that he has been pleasant and cooperative.  He has been compliant with treatment and medications.  Apparently he was initially very agitated with some significant thought disorder but is now cleared.    Continues to deny suicidal ideation is [place] and goal-directed    Review of Systems   Per primary team, no specific complaints today    Personal History     Past Medical History:   Diagnosis Date   • Asthma    • Cirrhosis (HCC)    • Colon polyps    • Diabetes mellitus (HCC)    • Hypertension    • Liver disease    • Reflux esophagitis    • Renal disorder    • Sleep apnea  " "      Past Surgical History:   Procedure Laterality Date   • COLONOSCOPY  2018, 2020   • ENDOSCOPY  2019   • FRACTURE SURGERY     • LEG SURGERY     • PELVIC FRACTURE SURGERY     • UPPER GASTROINTESTINAL ENDOSCOPY         Past Psychiatric History: History of anxiety being treated by primary care provider    Psychiatric Hospitalizations: None    Suicide Attempts: None    Prior Treatment and Medications Tried: None        Family History: family history includes Lung cancer in his father; Stomach cancer in his sister. Otherwise pertinent FHx was reviewed and not pertinent to current issue.    Social History:  reports that he has never smoked. He has never used smokeless tobacco. He reports that he does not drink alcohol and does not use drugs.    Born and raised in [place].  Quit school in 11th grade reports he had a learning disability.  Currently lives alone.    on 2 occasions.  Has 2 children.  Never the .  Reports he was raised Rastafarian not necessarily Congregational at this time.  No history of abuse in his background    Home Medications:  Alcohol Prep, Blood Glucose Test, Insulin Glargine, Morphine, Potassium, albuterol sulfate HFA, amLODIPine, baclofen, busPIRone, cyclobenzaprine, freestyle, furosemide, glimepiride, ketorolac, lactulose, meclizine, omeprazole, ondansetron ODT, oxyCODONE-acetaminophen, propranolol, sertraline, and spironolactone      Allergies:  No Known Allergies    Objective   Objective     Vitals:   Temp:  [97.5 °F (36.4 °C)-98.1 °F (36.7 °C)] 97.7 °F (36.5 °C)  Heart Rate:  [66-78] 66  Resp:  [20] 20  BP: (148-166)/(66-78) 162/78  Physical Exam       Mental Status Exam:     Hygiene:   good  Cooperation:  Cooperative  Eye Contact:  Good  Psychomotor Behavior:  Appropriate  Mood: \"Great shape\"  Affect:  Euthymic  Speech:  Normal  Language: Appropriate, relevant  Thought Process:  Linear  Thought Content:  Normal  Suicidal:  None  Homicidal:  None  Hallucinations:  " None  Delusion:  None  Memory:  Intact  Orientation:  Person, Place, Time and Situation  Reliability:  good  Insight:  Fair  Judgement:  Impaired and Significantly improved from time of admission  Impulse Control:  Good    Result Review    Result Review:  I have personally reviewed the results from the time of this admission to 12/7/2021 11:30 EST and agree with these findings:  []  Laboratory  []  Microbiology  []  Radiology  []  EKG/Telemetry   []  Cardiology/Vascular   []  Pathology  []  Old records  []  Other:  Most notable findings include:     Assessment/Plan   Assessment / Plan     Brief Patient Summary:  Nic Nicolas is a 56 y.o. male who admitted with hepatic encephalopathy    Active Hospital Problems:  Active Hospital Problems    Diagnosis    • Hepatic encephalopathy (HCC)        Plan:   1) patient reports his treatment for depression anxiety from his primary care provider is been very good and we continue his previous home regimen of psychiatric medications  2) denying suicidal ideation there is no agitation and may discontinue restraints  3) discontinue safety sitter  4) no need for acute inpatient psychiatric care and may discharge per psych    Electronically signed by Odell Stokes MD, 12/07/21, 11:30 AM EST.

## 2021-12-07 NOTE — PROGRESS NOTES
[facility]     Nephrology Progress Note      Patient Name: Nic Nicolas  : 1965  MRN: [id number]  Primary Care Physician:  Britney Echevarria APRN  Date of admission: 2021    Subjective   Subjective     Interval History:  Patient Reports feeling better.  Much more coherent.  Complaining of mild abdominal pain.  Tolerating p.o.  Excellent urine output.  Hypertensive today.    Review of Systems   All systems were reviewed and negative except for: What is mentioned above.    Objective   Objective     Vitals:   Temp:  [97.5 °F (36.4 °C)-98.1 °F (36.7 °C)] 97.7 °F (36.5 °C)  Heart Rate:  [66-78] 66  Resp:  [20] 20  BP: (148-166)/(66-78) 162/78  Physical Exam:   Constitutional: Awake, alert, not in distress, feeling better.   Eyes: sclerae anicteric, no conjunctival injection   HENT: mucous membranes moist   Neck: Supple, no thyromegaly, no lymphadenopathy, trachea midline, mild JVD   Respiratory: Clear to auscultation bilaterally, nonlabored respirations    Cardiovascular: RRR, no murmurs, rubs, or gallops.   Gastrointestinal: Positive bowel sounds, soft, right upper quadrant tenderness nondistended   Musculoskeletal: +1 edema worse on the right side., no clubbing or cyanosis   Psychiatric: Appropriate affect, cooperative   Neurologic: Oriented x 3, moving all extremities, Cranial Nerves grossly intact, speech clear   Skin: warm and dry, no rashes    Zamora catheter in place.    Result Review    Result Reviewed:  I have personally reviewed the results from the time of this admission to 2021 13:42 EST and agree with these findings:  [x]  Laboratory  []  Microbiology  [x]  Radiology  []  EKG/Telemetry   []  Cardiology/Vascular   []  Pathology  []  Old records  []  Other:  Lab Results   Component Value Date    GLUCOSE 105 (H) 2021    CALCIUM 7.9 (L) 2021     2021    K 3.7 2021    CO2 22.3 2021     (H) 2021    BUN 22 (H) 2021    CREATININE  1.08 12/07/2021    EGFRIFNONA 71 12/07/2021    BCR 20.4 12/07/2021    ANIONGAP 10.7 12/07/2021     Lab Results   Component Value Date    CALCIUM 7.9 (L) 12/07/2021    PHOS 3.5 12/06/2021      Results from last 7 days   Lab Units 12/06/21  0611   MAGNESIUM mg/dL 1.7          Most notable findings include: Creatinine down to 1.08.  Baseline creatinine around 0.6.    Assessment/Plan   Assessment / Plan       Active Hospital Problems:  Active Hospital Problems    Diagnosis    • Hepatic encephalopathy (HCC)        Assessment and Plan:    -Acute kidney injury in the setting of hypotension and possible mild volume depletion, resolving.  -Gram-negative bacilli UTI, culture is pending.  Empirical treatment per primary.  -Liver cirrhosis secondary to Ruiz with hepatic encephalopathy and markedly elevated ammonia, better.  Mental status improved.  -Abdominal pain, etiology is uncertain, per primary.  -Suicidal ideation, seems better, per psychiatry.  -Hypertension, resuming blood pressure medications.  Would resume also diuretics at smaller dose.  I will start Lasix 80 mg twice daily and spironolactone 100 mg p.o. twice daily and monitor.  -Diabetes with complications.  -Multifocal pneumonia, treatment per primary.  Will follow.      Electronically signed by Lisbeth Rubin MD, 12/07/21, 1:42 PM EST.

## 2021-12-08 LAB
ALBUMIN SERPL-MCNC: 2.3 G/DL (ref 3.5–5.2)
ALP SERPL-CCNC: 141 U/L (ref 39–117)
ALT SERPL W P-5'-P-CCNC: 12 U/L (ref 1–41)
ANION GAP SERPL CALCULATED.3IONS-SCNC: 9.1 MMOL/L (ref 5–15)
AST SERPL-CCNC: 41 U/L (ref 1–40)
BACTERIA BLD CULT: NORMAL
BACTERIA SPEC AEROBE CULT: ABNORMAL
BASOPHILS # BLD AUTO: 0.05 10*3/MM3 (ref 0–0.2)
BASOPHILS NFR BLD AUTO: 0.7 % (ref 0–1.5)
BILIRUB CONJ SERPL-MCNC: 0.8 MG/DL (ref 0–0.3)
BILIRUB INDIRECT SERPL-MCNC: 0.8 MG/DL
BILIRUB SERPL-MCNC: 1.6 MG/DL (ref 0–1.2)
BOTTLE TYPE: NORMAL
BUN SERPL-MCNC: 18 MG/DL (ref 6–20)
BUN/CREAT SERPL: 15.5 (ref 7–25)
CALCIUM SPEC-SCNC: 8 MG/DL (ref 8.6–10.5)
CHLORIDE SERPL-SCNC: 107 MMOL/L (ref 98–107)
CO2 SERPL-SCNC: 22.9 MMOL/L (ref 22–29)
CREAT SERPL-MCNC: 1.16 MG/DL (ref 0.76–1.27)
DEPRECATED RDW RBC AUTO: 49.4 FL (ref 37–54)
EOSINOPHIL # BLD AUTO: 0.06 10*3/MM3 (ref 0–0.4)
EOSINOPHIL NFR BLD AUTO: 0.8 % (ref 0.3–6.2)
ERYTHROCYTE [DISTWIDTH] IN BLOOD BY AUTOMATED COUNT: 15.6 % (ref 12.3–15.4)
FOLATE SERPL-MCNC: 8.46 NG/ML (ref 4.78–24.2)
GFR SERPL CREATININE-BSD FRML MDRD: 65 ML/MIN/1.73
GLUCOSE BLDC GLUCOMTR-MCNC: 111 MG/DL (ref 70–99)
GLUCOSE BLDC GLUCOMTR-MCNC: 130 MG/DL (ref 70–99)
GLUCOSE BLDC GLUCOMTR-MCNC: 152 MG/DL (ref 70–99)
GLUCOSE BLDC GLUCOMTR-MCNC: 175 MG/DL (ref 70–99)
GLUCOSE SERPL-MCNC: 191 MG/DL (ref 65–99)
HCT VFR BLD AUTO: 27.7 % (ref 37.5–51)
HGB BLD-MCNC: 9.3 G/DL (ref 13–17.7)
IMM GRANULOCYTES # BLD AUTO: 0.14 10*3/MM3 (ref 0–0.05)
IMM GRANULOCYTES NFR BLD AUTO: 1.9 % (ref 0–0.5)
INR PPP: 1.57 (ref 2–3)
LYMPHOCYTES # BLD AUTO: 1.23 10*3/MM3 (ref 0.7–3.1)
LYMPHOCYTES NFR BLD AUTO: 16.3 % (ref 19.6–45.3)
MAGNESIUM SERPL-MCNC: 1.7 MG/DL (ref 1.6–2.6)
MCH RBC QN AUTO: 29.2 PG (ref 26.6–33)
MCHC RBC AUTO-ENTMCNC: 33.6 G/DL (ref 31.5–35.7)
MCV RBC AUTO: 86.8 FL (ref 79–97)
MONOCYTES # BLD AUTO: 0.71 10*3/MM3 (ref 0.1–0.9)
MONOCYTES NFR BLD AUTO: 9.4 % (ref 5–12)
NEUTROPHILS NFR BLD AUTO: 5.35 10*3/MM3 (ref 1.7–7)
NEUTROPHILS NFR BLD AUTO: 70.9 % (ref 42.7–76)
NRBC BLD AUTO-RTO: 0 /100 WBC (ref 0–0.2)
PHOSPHATE SERPL-MCNC: 3.6 MG/DL (ref 2.5–4.5)
PLATELET # BLD AUTO: 108 10*3/MM3 (ref 140–450)
PMV BLD AUTO: 11 FL (ref 6–12)
POTASSIUM SERPL-SCNC: 3.3 MMOL/L (ref 3.5–5.2)
PROT SERPL-MCNC: 5.3 G/DL (ref 6–8.5)
PROTHROMBIN TIME: 15.5 SECONDS (ref 9.4–12)
RBC # BLD AUTO: 3.19 10*6/MM3 (ref 4.14–5.8)
SODIUM SERPL-SCNC: 139 MMOL/L (ref 136–145)
VIT B12 BLD-MCNC: 1658 PG/ML (ref 211–946)
WBC NRBC COR # BLD: 7.54 10*3/MM3 (ref 3.4–10.8)

## 2021-12-08 PROCEDURE — 84100 ASSAY OF PHOSPHORUS: CPT | Performed by: PHYSICIAN ASSISTANT

## 2021-12-08 PROCEDURE — 82962 GLUCOSE BLOOD TEST: CPT

## 2021-12-08 PROCEDURE — 80076 HEPATIC FUNCTION PANEL: CPT | Performed by: PHYSICIAN ASSISTANT

## 2021-12-08 PROCEDURE — 25010000002 HEPARIN (PORCINE) PER 1000 UNITS: Performed by: STUDENT IN AN ORGANIZED HEALTH CARE EDUCATION/TRAINING PROGRAM

## 2021-12-08 PROCEDURE — 82607 VITAMIN B-12: CPT | Performed by: PHYSICIAN ASSISTANT

## 2021-12-08 PROCEDURE — 63710000001 INSULIN LISPRO (HUMAN) PER 5 UNITS: Performed by: STUDENT IN AN ORGANIZED HEALTH CARE EDUCATION/TRAINING PROGRAM

## 2021-12-08 PROCEDURE — 82746 ASSAY OF FOLIC ACID SERUM: CPT | Performed by: PHYSICIAN ASSISTANT

## 2021-12-08 PROCEDURE — 25010000002 MORPHINE PER 10 MG: Performed by: GENERAL PRACTICE

## 2021-12-08 PROCEDURE — 25010000002 MAGNESIUM SULFATE IN D5W 1G/100ML (PREMIX) 1-5 GM/100ML-% SOLUTION: Performed by: PHYSICIAN ASSISTANT

## 2021-12-08 PROCEDURE — 25010000002 AZITHROMYCIN PER 500 MG: Performed by: STUDENT IN AN ORGANIZED HEALTH CARE EDUCATION/TRAINING PROGRAM

## 2021-12-08 PROCEDURE — 99233 SBSQ HOSP IP/OBS HIGH 50: CPT | Performed by: INTERNAL MEDICINE

## 2021-12-08 PROCEDURE — 80048 BASIC METABOLIC PNL TOTAL CA: CPT | Performed by: PHYSICIAN ASSISTANT

## 2021-12-08 PROCEDURE — 63710000001 INSULIN DETEMIR PER 5 UNITS: Performed by: INTERNAL MEDICINE

## 2021-12-08 PROCEDURE — 85025 COMPLETE CBC W/AUTO DIFF WBC: CPT | Performed by: INTERNAL MEDICINE

## 2021-12-08 PROCEDURE — 83735 ASSAY OF MAGNESIUM: CPT | Performed by: PHYSICIAN ASSISTANT

## 2021-12-08 PROCEDURE — 25010000002 CEFTRIAXONE PER 250 MG: Performed by: STUDENT IN AN ORGANIZED HEALTH CARE EDUCATION/TRAINING PROGRAM

## 2021-12-08 PROCEDURE — 25010000002 ONDANSETRON PER 1 MG: Performed by: PHYSICIAN ASSISTANT

## 2021-12-08 PROCEDURE — 87040 BLOOD CULTURE FOR BACTERIA: CPT | Performed by: PHYSICIAN ASSISTANT

## 2021-12-08 PROCEDURE — 25010000002 VANCOMYCIN 5 G RECONSTITUTED SOLUTION: Performed by: PHYSICIAN ASSISTANT

## 2021-12-08 PROCEDURE — 85610 PROTHROMBIN TIME: CPT | Performed by: PHYSICIAN ASSISTANT

## 2021-12-08 RX ORDER — MAGNESIUM SULFATE 1 G/100ML
1 INJECTION INTRAVENOUS ONCE
Status: COMPLETED | OUTPATIENT
Start: 2021-12-08 | End: 2021-12-08

## 2021-12-08 RX ORDER — POTASSIUM CHLORIDE 750 MG/1
30 CAPSULE, EXTENDED RELEASE ORAL DAILY
Status: DISCONTINUED | OUTPATIENT
Start: 2021-12-08 | End: 2021-12-08

## 2021-12-08 RX ORDER — LACTULOSE 10 G/15ML
20 SOLUTION ORAL 2 TIMES DAILY
Status: DISCONTINUED | OUTPATIENT
Start: 2021-12-08 | End: 2021-12-11 | Stop reason: HOSPADM

## 2021-12-08 RX ORDER — ONDANSETRON 2 MG/ML
4 INJECTION INTRAMUSCULAR; INTRAVENOUS EVERY 6 HOURS PRN
Status: DISCONTINUED | OUTPATIENT
Start: 2021-12-08 | End: 2021-12-11 | Stop reason: HOSPADM

## 2021-12-08 RX ADMIN — SODIUM CHLORIDE, PRESERVATIVE FREE 10 ML: 5 INJECTION INTRAVENOUS at 21:55

## 2021-12-08 RX ADMIN — MORPHINE SULFATE 1 MG: 2 INJECTION, SOLUTION INTRAMUSCULAR; INTRAVENOUS at 21:51

## 2021-12-08 RX ADMIN — VANCOMYCIN HYDROCHLORIDE 1250 MG: 5 INJECTION, POWDER, LYOPHILIZED, FOR SOLUTION INTRAVENOUS at 21:54

## 2021-12-08 RX ADMIN — AMLODIPINE BESYLATE 10 MG: 5 TABLET ORAL at 09:11

## 2021-12-08 RX ADMIN — PANTOPRAZOLE SODIUM 40 MG: 40 INJECTION, POWDER, FOR SOLUTION INTRAVENOUS at 06:04

## 2021-12-08 RX ADMIN — MORPHINE SULFATE 1 MG: 2 INJECTION, SOLUTION INTRAMUSCULAR; INTRAVENOUS at 13:47

## 2021-12-08 RX ADMIN — MAGNESIUM SULFATE 1 G: 1 INJECTION INTRAVENOUS at 10:24

## 2021-12-08 RX ADMIN — BUSPIRONE HYDROCHLORIDE 5 MG: 5 TABLET ORAL at 18:03

## 2021-12-08 RX ADMIN — ONDANSETRON 4 MG: 2 INJECTION INTRAMUSCULAR; INTRAVENOUS at 19:10

## 2021-12-08 RX ADMIN — HEPARIN SODIUM 5000 UNITS: 5000 INJECTION, SOLUTION INTRAVENOUS; SUBCUTANEOUS at 06:04

## 2021-12-08 RX ADMIN — POTASSIUM CHLORIDE 30 MEQ: 750 CAPSULE, EXTENDED RELEASE ORAL at 09:11

## 2021-12-08 RX ADMIN — MORPHINE SULFATE 1 MG: 2 INJECTION, SOLUTION INTRAMUSCULAR; INTRAVENOUS at 04:35

## 2021-12-08 RX ADMIN — RIFAXIMIN 550 MG: 550 TABLET ORAL at 21:51

## 2021-12-08 RX ADMIN — INSULIN LISPRO 2 UNITS: 100 INJECTION, SOLUTION INTRAVENOUS; SUBCUTANEOUS at 12:42

## 2021-12-08 RX ADMIN — HEPARIN SODIUM 5000 UNITS: 5000 INJECTION, SOLUTION INTRAVENOUS; SUBCUTANEOUS at 13:47

## 2021-12-08 RX ADMIN — CEFTRIAXONE SODIUM 1 G: 1 INJECTION, SOLUTION INTRAVENOUS at 18:03

## 2021-12-08 RX ADMIN — BUSPIRONE HYDROCHLORIDE 5 MG: 5 TABLET ORAL at 09:11

## 2021-12-08 RX ADMIN — SPIRONOLACTONE 100 MG: 25 TABLET ORAL at 09:11

## 2021-12-08 RX ADMIN — BUSPIRONE HYDROCHLORIDE 5 MG: 5 TABLET ORAL at 21:51

## 2021-12-08 RX ADMIN — MORPHINE SULFATE 1 MG: 2 INJECTION, SOLUTION INTRAMUSCULAR; INTRAVENOUS at 18:03

## 2021-12-08 RX ADMIN — AZITHROMYCIN DIHYDRATE 500 MG: 500 INJECTION, POWDER, LYOPHILIZED, FOR SOLUTION INTRAVENOUS at 09:10

## 2021-12-08 RX ADMIN — VANCOMYCIN HYDROCHLORIDE 3000 MG: 5 INJECTION, POWDER, LYOPHILIZED, FOR SOLUTION INTRAVENOUS at 12:42

## 2021-12-08 RX ADMIN — RIFAXIMIN 550 MG: 550 TABLET ORAL at 09:11

## 2021-12-08 RX ADMIN — LACTULOSE 30 G: 20 SOLUTION ORAL at 09:11

## 2021-12-08 RX ADMIN — INSULIN DETEMIR 10 UNITS: 100 INJECTION, SOLUTION SUBCUTANEOUS at 21:48

## 2021-12-08 RX ADMIN — FUROSEMIDE 80 MG: 40 TABLET ORAL at 18:03

## 2021-12-08 RX ADMIN — SODIUM CHLORIDE, PRESERVATIVE FREE 10 ML: 5 INJECTION INTRAVENOUS at 09:15

## 2021-12-08 RX ADMIN — FUROSEMIDE 80 MG: 40 TABLET ORAL at 09:11

## 2021-12-08 RX ADMIN — PROPRANOLOL HYDROCHLORIDE 60 MG: 40 TABLET ORAL at 09:11

## 2021-12-08 RX ADMIN — ONDANSETRON 4 MG: 2 INJECTION INTRAMUSCULAR; INTRAVENOUS at 13:47

## 2021-12-08 RX ADMIN — SPIRONOLACTONE 100 MG: 25 TABLET ORAL at 18:03

## 2021-12-08 RX ADMIN — MORPHINE SULFATE 1 MG: 2 INJECTION, SOLUTION INTRAMUSCULAR; INTRAVENOUS at 09:10

## 2021-12-08 NOTE — PROGRESS NOTES
[facility]     Nephrology Progress Note      Patient Name: Nic Nicolas  : 1965  MRN: [id number]  Primary Care Physician:  Britney Echevarria APRN  Date of admission: 2021    Subjective   Subjective     Interval History:    Patient fully awake   Able to establish a coherent conversation   Tolerating oral intake   No fever or chills   Review of Systems   All systems were reviewed and negative except for: What is mentioned above.    Objective   Objective     Vitals:   Temp:  [97.2 °F (36.2 °C)-97.9 °F (36.6 °C)] 97.2 °F (36.2 °C)  Heart Rate:  [66-72] 71  Resp:  [16-20] 16  BP: (100-162)/(44-78) 134/68     Physical Exam:   Constitutional: Awake, alert, not in distress,   Eyes: sclerae anicteric, no conjunctival injection   HENT: mucous membranes moist   Neck: Supple, no thyromegaly, no lymphadenopathy, trachea midline, mild JVD   Respiratory: Clear to auscultation bilaterally, nonlabored respirations    Cardiovascular: RRR, no murmurs, rubs, or gallops.   Gastrointestinal: Positive bowel sounds, soft,abdomen distended    Musculoskeletal: +1 edema worse on the right side., no clubbing or cyanosis, stasis dermatitis ankle bilateral    Psychiatric: Appropriate affect, cooperative   Neurologic: Oriented x 3, moving all extremities, Cranial Nerves grossly intact, speech clear   Skin: warm and dry, no rashes    Zamora catheter in place.    Result Review    Result Reviewed:    Results from last 7 days   Lab Units 21  0453 21  0445 21  0611   WBC 10*3/mm3 7.54 9.44 9.45   HEMOGLOBIN g/dL 9.3* 9.0* 9.1*   HEMATOCRIT % 27.7* 27.6* 25.7*   PLATELETS 10*3/mm3 108* 91* 100*         Lab Results   Component Value Date    GLUCOSE 191 (H) 2021    CALCIUM 8.0 (L) 2021     2021    K 3.3 (L) 2021    CO2 22.9 2021     2021    BUN 18 2021    CREATININE 1.16 2021    EGFRIFNONA 65 2021    BCR 15.5 2021    ANIONGAP 9.1 2021  "    Lab Results   Component Value Date    CALCIUM 8.0 (L) 12/08/2021    PHOS 3.6 12/08/2021      Results from last 7 days   Lab Units 12/08/21  0453   MAGNESIUM mg/dL 1.7          Most notable findings include: Creatinine down to 1.08.  Baseline creatinine around 0.6.    Assessment/Plan   Assessment / Plan     Lab Results   Lab Value Date/Time    CREATININE 1.16 12/08/2021 0453    CREATININE 1.08 12/07/2021 0445    CREATININE 1.55 (H) 12/06/2021 0611    CREATININE 2.08 (H) 12/05/2021 1325    CREATININE 0.68 (L) 11/29/2021 1345    CREATININE 0.72 (L) 11/18/2021 1041    CREATININE 0.73 (L) 11/16/2021 1110    CREATININE 0.80 11/02/2021 1257    CREATININE 0.86 08/26/2021 2151    CREATININE 0.71 (L) 08/13/2021 1155    CREATININE 0.65 (L) 08/03/2021 1206    CREATININE 0.67 (L) 07/10/2021 1142    CREATININE 0.80 06/01/2021 1622    CREATININE 0.82 09/30/2019 1542    CREATININE 1.06 07/26/2019 1456    CREATININE 0.87 07/05/2019 1344    CREATININE 0.92 01/08/2019 0702         ASSESSMENT:     -Acute kidney injury likely secondary to prerenal state /volume depletion, secondary to confusion decreased oral intake , while on amlodipine, propanolol  Furosemide and spironolactone. Baseline creatinine 0.6 upon admission peaked at 2.0  currently 1.1 , after fluid resuscitation and albumin. Urinalysis minimal proteinuria with pyuria with 4+ bacteria .urine culture. In process. Ct abdomen  ( 8/21 ) \" Kidneys are normal bilaterally. There is no hydronephrosis.  -Acute hepatic encephalopathy secondary to hyper ammonia currently on lactulose following ammonia levels trend. As per primary team. Marked clinical improvement   -Suicidal ideation. As per primary team . Appreciate psychiatry management  -Hypertension.  Continue  combination of   amlodipine , furosemide + spironolactone     . We will continue to follow closely. Heart healthy diet  -Diabetes Mellitus type 2 w/ complications ( retinopathy , peripheral vascular disease , " "gastroparesis , neuropathy , nephropathy ) .Continue insulin regimen / hypoglycemic regimen .Hypoglycemic protocol . POC glucose 4 x day .Diabetic diet  -Liver disease from cirrhosis due to probable nonalcoholic fatty liver disease ( previous images studies showing \" 8/13/2021 findings consistent with cirrhosis with portal venous hypertension splenomegaly ) with ascites requiring paracentesis (paracentesis 11/18/2021 removal of 6 L ) . As per gastroenterology recommendations .  -Multifocal pneumonia on azithromycin and ceftriaxone     PLAN:  -Follow renal function closely, slowly improving after re- initiation on IV diuretics .   -Avoid nephrotoxins  -Adjust medications to GFR        Thank you for involving us in the care of Nic Nicolas.  Please feel free to call with any questions.                   "

## 2021-12-08 NOTE — PLAN OF CARE
Problem: Adult Inpatient Plan of Care  Goal: Plan of Care Review  Outcome: Ongoing, Progressing  Flowsheets (Taken 12/8/2021 1631)  Progress: improving  Plan of Care Reviewed With: patient  Outcome Summary: Patient continues to have improving mentation and has remained alert and oriented this shift. VSS. Pt comlaints of pain and neausea treated per MAR. Pt to have percentesis in AM. No concerns noted at this time. Will continue to monitor.   Goal Outcome Evaluation:  Plan of Care Reviewed With: patient        Progress: improving  Outcome Summary: Patient continues to have improving mentation and has remained alert and oriented this shift. VSS. Pt comlaints of pain and neausea treated per MAR. Pt to have percentesis in AM. No concerns noted at this time. Will continue to monitor.

## 2021-12-08 NOTE — PROGRESS NOTES
" [facility]   Hospitalist Progress Note  Date: 2021  Patient Name: Nic Nicolas  : 1965  MRN: [id number]  Date of admission: 2021      Subjective   Subjective     Chief Complaint: SI and encphephalopathy    Summary: 56-year-old male with cirrhosis and other medical problems presented to the ED after reportedly telling his neighbor that he wanted to \"blow his brains out.\"  Per the admitting hospitalist, he was not able to get additional history as the patient was combative and spitting on nurses and physicians.  Ammonia was elevated to 131.  He was given an NG tube for lactulose but then pulled it out.  He was started on Rocephin and Zithromax for right lower lobe pneumonia.  He had worsening of his renal failure and nephrology was consulted.  He was admitted to the medicine service.    Interval Followup:   Patient awake alert oriented no acute distress abdomen distended and tight may benefit from therapeutic paracentesis.  Renal function stable on oral Lasix and Aldactone we will continue to monitor.  Will replace potassium.  1 of 2 blood cultures positive suspect contaminant we will start vancomycin and repeat blood cultures but again suspect contaminant.  Urine culture growing gram-negative rods continue Rocephin.    Review of Systems  All systems reviewed negative except the following: Patient complains of pain from NG tube and Zamora catheter    Objective   Objective     Vitals:   Temp:  [97.2 °F (36.2 °C)-97.9 °F (36.6 °C)] 97.9 °F (36.6 °C)  Heart Rate:  [67-72] 67  Resp:  [16-20] 20  BP: (100-155)/(44-77) 150/67  Physical Exam    Constitutional: Awake alert oriented no acute distress   Eyes: atraumatic; pupils appear equal   HENT: NCAT, mucous membranes moist   Neck: Supple, no lad   Respiratory: no resp distress; no stridor   Cardiovascular: no m, reg rhythm   Gastrointestinal: Positive bowel sounds, nontender mildly distended and taut   musculoskeletal: Bilateral edema noted no " clubbing or cyanosis to extremities   Psychiatric: Mood and affect normal no suicidal ideations   neurologic: No focal deficits awake alert oriented   skin: No rashes     Result Review    Result Review:  I have personally reviewed the results from the time of this admission to 12/8/2021 11:48 EST and agree with these findings:  [x]  Laboratory  [x]  Microbiology  [x]  Radiology  []  EKG/Telemetry   []  Cardiology/Vascular   []  Pathology  []  Old records  []  Other:    Assessment/Plan   Assessment / Plan     Assessment:  Acute hepatic encephalopathy resolved  Suicidal comments suspect related to hepatic encephalopathy currently without any suicidal ideations  IVELISSE resolved  Right lower lobe pneumonia  Urinary tract infection secondary to E. coli continue Rocephin  Wanted to blood cultures positive for gram-positive cocci suspect contaminant started vancomycin repeat blood cultures  hepatic cirrhosis  Diabetes  Coagulopathy  Thrombocytopenia    Plan:  Arrange for ultrasound-guided paracentesis discussed with IR  Decrease lactulose dose and frequency goal is for 2-3 soft bowel movements daily  Continue rifaximin  Continue Rocephin for urinary tract infection and pneumonia  Start vancomycin given positive blood culture however suspect this is a contaminant repeat blood cultures today  Patient may benefit from paracentesis defer to GI  Continue oral diuretics with Lasix and Aldactone monitor renal function  Replace potassium  GI consulted appreciate recommendations  Psychiatry consultation appreciated  Nephrology consultation appreciated  Discussed plan with RN.    DVT prophylaxis:  Medical DVT prophylaxis orders are present.    CODE STATUS:        Electronically signed by SOLEDAD Oliva, 12/08/21, 11:53 AM EST.  .    Attending Note:  I have seen and examined the patient and agree with the above information from Kenny Woodard PA-C and have outlined plan of care.  56-year-old male with cirrhosis presented to the  ED with significant hepatic encephalopathy.  Ammonia level was elevated to 131.  He required tats and a facemask as he was combative and spitting.  He had an NG tube replaced to get lactulose.  He is improved nicely.  Doing well today, has abdominal distention.  Will need paracentesis before discharge.   Electronically signed by Dominick Davila MD, 12/08/21, 5:09 PM EST.

## 2021-12-08 NOTE — PROGRESS NOTES
"Pharmacy to Dose Vancomycin Day: 1    Nic Nicolas is a 56 y.o.male admitted with suicidal ideation. Pharmacy has been consulted to dose IV Vancomycin     Consulting Provider: TERESA Woodard  Clinical Indication: Bacteremia  Pertinent Past Medical History: N/A   Goal -600 mg/L.hr  Duration of therapy: 7 days per consult    188 cm (74\")       12/08/21  0500      Weight: 134 kg (295 lb 10.2 oz)         Estimated Creatinine Clearance: 104.8 mL/min (by C-G formula based on SCr of 1.16 mg/dL).  Results from last 7 days   Lab Units 12/08/21  0453 12/07/21  0445 12/06/21  0611 12/05/21  1325   BUN mg/dL 18 22* 27* 30*   CREATININE mg/dL 1.16 1.08 1.55* 2.08*       HD/PD/CRRT?: No    Lab Results   Component Value Date    WBC 7.54 12/08/2021      Temperature    12/07/21 2334 12/08/21 0231 12/08/21 0721   Temp: 97.7 °F (36.5 °C) 97.3 °F (36.3 °C) 97.2 °F (36.2 °C)        Contrast Administered: No    Relevant Micro:   Microbiology Results (last 10 days)       Procedure Component Value - Date/Time    Blood Culture - Blood, Blood, Venous Line [[id number]]  (Abnormal) Collected: 12/05/21 1815    Lab Status: Preliminary result Specimen: Blood, Venous Line Updated: 12/08/21 0654     Blood Culture Abnormal Stain     Gram Stain Anaerobic Bottle Gram positive cocci in clusters    Blood Culture - Blood, Arm, Left [[id number]]  (Normal) Collected: 12/05/21 1814    Lab Status: Preliminary result Specimen: Blood from Arm, Left Updated: 12/07/21 1830     Blood Culture No growth at 2 days    COVID-19,CEPHEID/ANNE MARIE/BDMAX,COR/CATRACHITA/PAD/PAO IN-HOUSE(OR EMERGENT/ADD-ON),NP SWAB IN TRANSPORT MEDIA 3-4 HR TAT, RT-PCR - Swab, Nasopharynx [[id number]]  (Normal) Collected: 12/05/21 1358    Lab Status: Final result Specimen: Swab from Nasopharynx Updated: 12/05/21 1734     COVID19 Not Detected    Narrative:      Fact sheet for providers: https://www.fda.gov/media/421577/download     Fact sheet for patients: " https://www.fda.gov/media/622586/download  Presumptive Positive: additional testing may be indicated if it is necessary to differentiate between SARS-CoV-2 and other Sarbecovirus, for epidemiological purposes, or clinical management.    Urine Culture - Urine, Urine, Clean Catch [[id number]]  (Abnormal)  (Susceptibility) Collected: 12/05/21 6079    Lab Status: Final result Specimen: Urine, Clean Catch Updated: 12/08/21 7004     Urine Culture >100,000 CFU/mL Escherichia coli    Susceptibility      Escherichia coli  CRYSTAL  Ampicillin  Resistant  Ampicillin + Sulbactam  Susceptible  Cefazolin  Susceptible  Cefepime  Susceptible  Ceftazidime  Susceptible  Ceftriaxone  Susceptible  Gentamicin  Susceptible  Levofloxacin  Susceptible  Nitrofurantoin  Susceptible  Piperacillin + Tazobactam  Susceptible  Tetracycline  Resistant  Trimethoprim + Sulfamethoxazole  Resistant                          Relevant Radiology: Bilateral infiltrates persist.  The findings may represent infectious multifocal   pneumonia.  Interval placement of a nasogastric tube is noted.  Its distal portion is projected   below the diaphragm.      Other Antimicrobial Therapy: Azithromycin 500mg daily, Ceftriaxone 1000mg daily     Assessment/Plan  Loading dose: 3000mg   Regimen: 1250mg   Exposure Target: AUC24 (range) 400-600 mg/L.hr  AUC24,ss: q12h   AUC24,ss: 481 mg/L.hr  PAUC*: 70 %  Ctrough,ss: 16 mg/L  Pconc*: 28 %  Tox.: 11 %    Note: 56 YOM starting vancomycin for possible bacteremia. 1/2 blood cultures with GPC. Will dose as above for estimated AUC of 481. Will check random level prior to 3rd dose to assess clearance and need for dose adjustment.     Level due: 12/9 AM   Labs ordered: Vanc random, BMP in AM.

## 2021-12-09 ENCOUNTER — APPOINTMENT (OUTPATIENT)
Dept: INTERVENTIONAL RADIOLOGY/VASCULAR | Facility: HOSPITAL | Age: 56
End: 2021-12-09

## 2021-12-09 LAB
ALBUMIN SERPL-MCNC: 2.3 G/DL (ref 3.5–5.2)
ANION GAP SERPL CALCULATED.3IONS-SCNC: 8.7 MMOL/L (ref 5–15)
APPEARANCE FLD: ABNORMAL
BASOPHILS # BLD AUTO: 0.03 10*3/MM3 (ref 0–0.2)
BASOPHILS NFR BLD AUTO: 0.4 % (ref 0–1.5)
BUN SERPL-MCNC: 15 MG/DL (ref 6–20)
BUN/CREAT SERPL: 13.3 (ref 7–25)
CALCIUM SPEC-SCNC: 7.9 MG/DL (ref 8.6–10.5)
CHLORIDE SERPL-SCNC: 103 MMOL/L (ref 98–107)
CO2 SERPL-SCNC: 25.3 MMOL/L (ref 22–29)
COLOR FLD: ABNORMAL
CREAT SERPL-MCNC: 1.13 MG/DL (ref 0.76–1.27)
DEPRECATED RDW RBC AUTO: 48 FL (ref 37–54)
EOSINOPHIL # BLD AUTO: 0.09 10*3/MM3 (ref 0–0.4)
EOSINOPHIL NFR BLD AUTO: 1.2 % (ref 0.3–6.2)
ERYTHROCYTE [DISTWIDTH] IN BLOOD BY AUTOMATED COUNT: 15.4 % (ref 12.3–15.4)
GFR SERPL CREATININE-BSD FRML MDRD: 67 ML/MIN/1.73
GLUCOSE BLDC GLUCOMTR-MCNC: 124 MG/DL (ref 70–99)
GLUCOSE BLDC GLUCOMTR-MCNC: 128 MG/DL (ref 70–99)
GLUCOSE BLDC GLUCOMTR-MCNC: 149 MG/DL (ref 70–99)
GLUCOSE BLDC GLUCOMTR-MCNC: 86 MG/DL (ref 70–99)
GLUCOSE SERPL-MCNC: 107 MG/DL (ref 65–99)
HCT VFR BLD AUTO: 25.7 % (ref 37.5–51)
HGB BLD-MCNC: 8.7 G/DL (ref 13–17.7)
IMM GRANULOCYTES # BLD AUTO: 0.1 10*3/MM3 (ref 0–0.05)
IMM GRANULOCYTES NFR BLD AUTO: 1.4 % (ref 0–0.5)
INR PPP: 1.63 (ref 2–3)
LYMPHOCYTES # BLD AUTO: 1.4 10*3/MM3 (ref 0.7–3.1)
LYMPHOCYTES NFR BLD AUTO: 18.9 % (ref 19.6–45.3)
LYMPHOCYTES NFR FLD MANUAL: 16 %
MAGNESIUM SERPL-MCNC: 1.4 MG/DL (ref 1.6–2.6)
MCH RBC QN AUTO: 29.3 PG (ref 26.6–33)
MCHC RBC AUTO-ENTMCNC: 33.9 G/DL (ref 31.5–35.7)
MCV RBC AUTO: 86.5 FL (ref 79–97)
MONOCYTES # BLD AUTO: 0.75 10*3/MM3 (ref 0.1–0.9)
MONOCYTES NFR BLD AUTO: 10.1 % (ref 5–12)
MONOCYTES NFR FLD: 32 %
NEUTROPHILS NFR BLD AUTO: 5.03 10*3/MM3 (ref 1.7–7)
NEUTROPHILS NFR BLD AUTO: 68 % (ref 42.7–76)
NEUTROPHILS NFR FLD MANUAL: 52 %
NRBC BLD AUTO-RTO: 0 /100 WBC (ref 0–0.2)
NUC CELL # FLD: 234 /MM3
PHOSPHATE SERPL-MCNC: 3.4 MG/DL (ref 2.5–4.5)
PLATELET # BLD AUTO: 91 10*3/MM3 (ref 140–450)
PMV BLD AUTO: 9.9 FL (ref 6–12)
POTASSIUM SERPL-SCNC: 3.4 MMOL/L (ref 3.5–5.2)
PROTHROMBIN TIME: 16.1 SECONDS (ref 9.4–12)
RBC # BLD AUTO: 2.97 10*6/MM3 (ref 4.14–5.8)
RBC # FLD AUTO: 3000 /MM3
SODIUM SERPL-SCNC: 137 MMOL/L (ref 136–145)
VANCOMYCIN SERPL-MCNC: 20.99 MCG/ML (ref 5–40)
WBC NRBC COR # BLD: 7.4 10*3/MM3 (ref 3.4–10.8)

## 2021-12-09 PROCEDURE — 25010000002 ONDANSETRON PER 1 MG: Performed by: PHYSICIAN ASSISTANT

## 2021-12-09 PROCEDURE — 89051 BODY FLUID CELL COUNT: CPT | Performed by: NURSE PRACTITIONER

## 2021-12-09 PROCEDURE — 25010000002 AZITHROMYCIN PER 500 MG: Performed by: STUDENT IN AN ORGANIZED HEALTH CARE EDUCATION/TRAINING PROGRAM

## 2021-12-09 PROCEDURE — 63710000001 INSULIN DETEMIR PER 5 UNITS: Performed by: INTERNAL MEDICINE

## 2021-12-09 PROCEDURE — 80069 RENAL FUNCTION PANEL: CPT | Performed by: PHYSICIAN ASSISTANT

## 2021-12-09 PROCEDURE — P9047 ALBUMIN (HUMAN), 25%, 50ML: HCPCS | Performed by: INTERNAL MEDICINE

## 2021-12-09 PROCEDURE — 87205 SMEAR GRAM STAIN: CPT | Performed by: NURSE PRACTITIONER

## 2021-12-09 PROCEDURE — 25010000002 MAGNESIUM SULFATE 2 GM/50ML SOLUTION: Performed by: INTERNAL MEDICINE

## 2021-12-09 PROCEDURE — 25010000002 VANCOMYCIN 5 G RECONSTITUTED SOLUTION: Performed by: PHYSICIAN ASSISTANT

## 2021-12-09 PROCEDURE — 85025 COMPLETE CBC W/AUTO DIFF WBC: CPT | Performed by: PHYSICIAN ASSISTANT

## 2021-12-09 PROCEDURE — 25010000002 CEFTRIAXONE PER 250 MG: Performed by: PHYSICIAN ASSISTANT

## 2021-12-09 PROCEDURE — 0W9G3ZZ DRAINAGE OF PERITONEAL CAVITY, PERCUTANEOUS APPROACH: ICD-10-PCS | Performed by: INTERNAL MEDICINE

## 2021-12-09 PROCEDURE — 82962 GLUCOSE BLOOD TEST: CPT

## 2021-12-09 PROCEDURE — 99233 SBSQ HOSP IP/OBS HIGH 50: CPT | Performed by: INTERNAL MEDICINE

## 2021-12-09 PROCEDURE — 25010000002 ALBUMIN HUMAN 25% PER 50 ML: Performed by: INTERNAL MEDICINE

## 2021-12-09 PROCEDURE — 25010000002 MORPHINE PER 10 MG: Performed by: GENERAL PRACTICE

## 2021-12-09 PROCEDURE — C1729 CATH, DRAINAGE: HCPCS

## 2021-12-09 PROCEDURE — 85610 PROTHROMBIN TIME: CPT | Performed by: PHYSICIAN ASSISTANT

## 2021-12-09 PROCEDURE — 88108 CYTOPATH CONCENTRATE TECH: CPT | Performed by: NURSE PRACTITIONER

## 2021-12-09 PROCEDURE — 87070 CULTURE OTHR SPECIMN AEROBIC: CPT | Performed by: NURSE PRACTITIONER

## 2021-12-09 PROCEDURE — 80202 ASSAY OF VANCOMYCIN: CPT | Performed by: PHYSICIAN ASSISTANT

## 2021-12-09 PROCEDURE — 83735 ASSAY OF MAGNESIUM: CPT | Performed by: PHYSICIAN ASSISTANT

## 2021-12-09 RX ORDER — POTASSIUM CHLORIDE 750 MG/1
40 CAPSULE, EXTENDED RELEASE ORAL ONCE
Status: COMPLETED | OUTPATIENT
Start: 2021-12-09 | End: 2021-12-09

## 2021-12-09 RX ORDER — LIDOCAINE HYDROCHLORIDE 20 MG/ML
INJECTION, SOLUTION INFILTRATION; PERINEURAL
Status: COMPLETED
Start: 2021-12-09 | End: 2021-12-09

## 2021-12-09 RX ORDER — MAGNESIUM SULFATE HEPTAHYDRATE 40 MG/ML
2 INJECTION, SOLUTION INTRAVENOUS ONCE
Status: COMPLETED | OUTPATIENT
Start: 2021-12-09 | End: 2021-12-09

## 2021-12-09 RX ORDER — ALBUMIN (HUMAN) 12.5 G/50ML
25 SOLUTION INTRAVENOUS
Status: COMPLETED | OUTPATIENT
Start: 2021-12-09 | End: 2021-12-09

## 2021-12-09 RX ORDER — TAMSULOSIN HYDROCHLORIDE 0.4 MG/1
0.4 CAPSULE ORAL NIGHTLY
Status: DISCONTINUED | OUTPATIENT
Start: 2021-12-09 | End: 2021-12-11 | Stop reason: HOSPADM

## 2021-12-09 RX ADMIN — LIDOCAINE HYDROCHLORIDE 10 ML: 20 INJECTION, SOLUTION INFILTRATION; PERINEURAL at 09:23

## 2021-12-09 RX ADMIN — ALBUMIN HUMAN 25 G: 0.25 SOLUTION INTRAVENOUS at 10:05

## 2021-12-09 RX ADMIN — VANCOMYCIN HYDROCHLORIDE 1250 MG: 5 INJECTION, POWDER, LYOPHILIZED, FOR SOLUTION INTRAVENOUS at 11:02

## 2021-12-09 RX ADMIN — MORPHINE SULFATE 1 MG: 2 INJECTION, SOLUTION INTRAMUSCULAR; INTRAVENOUS at 01:54

## 2021-12-09 RX ADMIN — PROPRANOLOL HYDROCHLORIDE 60 MG: 40 TABLET ORAL at 10:13

## 2021-12-09 RX ADMIN — ONDANSETRON 4 MG: 2 INJECTION INTRAMUSCULAR; INTRAVENOUS at 13:11

## 2021-12-09 RX ADMIN — SPIRONOLACTONE 100 MG: 25 TABLET ORAL at 10:13

## 2021-12-09 RX ADMIN — TAMSULOSIN HYDROCHLORIDE 0.4 MG: 0.4 CAPSULE ORAL at 20:46

## 2021-12-09 RX ADMIN — MORPHINE SULFATE 1 MG: 2 INJECTION, SOLUTION INTRAMUSCULAR; INTRAVENOUS at 22:30

## 2021-12-09 RX ADMIN — FUROSEMIDE 80 MG: 40 TABLET ORAL at 10:13

## 2021-12-09 RX ADMIN — SODIUM CHLORIDE, PRESERVATIVE FREE 10 ML: 5 INJECTION INTRAVENOUS at 10:07

## 2021-12-09 RX ADMIN — MORPHINE SULFATE 1 MG: 2 INJECTION, SOLUTION INTRAMUSCULAR; INTRAVENOUS at 05:58

## 2021-12-09 RX ADMIN — FUROSEMIDE 80 MG: 40 TABLET ORAL at 17:12

## 2021-12-09 RX ADMIN — PROPRANOLOL HYDROCHLORIDE 60 MG: 40 TABLET ORAL at 20:46

## 2021-12-09 RX ADMIN — MORPHINE SULFATE 1 MG: 2 INJECTION, SOLUTION INTRAMUSCULAR; INTRAVENOUS at 14:15

## 2021-12-09 RX ADMIN — CEFTRIAXONE SODIUM 1 G: 1 INJECTION, SOLUTION INTRAVENOUS at 17:37

## 2021-12-09 RX ADMIN — RIFAXIMIN 550 MG: 550 TABLET ORAL at 20:45

## 2021-12-09 RX ADMIN — SPIRONOLACTONE 100 MG: 25 TABLET ORAL at 17:12

## 2021-12-09 RX ADMIN — BUSPIRONE HYDROCHLORIDE 5 MG: 5 TABLET ORAL at 17:12

## 2021-12-09 RX ADMIN — MAGNESIUM SULFATE 2 G: 2 INJECTION INTRAVENOUS at 07:33

## 2021-12-09 RX ADMIN — MORPHINE SULFATE 1 MG: 2 INJECTION, SOLUTION INTRAMUSCULAR; INTRAVENOUS at 18:34

## 2021-12-09 RX ADMIN — BUSPIRONE HYDROCHLORIDE 5 MG: 5 TABLET ORAL at 20:46

## 2021-12-09 RX ADMIN — AMLODIPINE BESYLATE 10 MG: 5 TABLET ORAL at 10:14

## 2021-12-09 RX ADMIN — POTASSIUM CHLORIDE 40 MEQ: 750 CAPSULE, EXTENDED RELEASE ORAL at 10:14

## 2021-12-09 RX ADMIN — INSULIN DETEMIR 10 UNITS: 100 INJECTION, SOLUTION SUBCUTANEOUS at 20:44

## 2021-12-09 RX ADMIN — AZITHROMYCIN DIHYDRATE 500 MG: 500 INJECTION, POWDER, LYOPHILIZED, FOR SOLUTION INTRAVENOUS at 12:44

## 2021-12-09 RX ADMIN — MORPHINE SULFATE 1 MG: 2 INJECTION, SOLUTION INTRAMUSCULAR; INTRAVENOUS at 10:06

## 2021-12-09 RX ADMIN — BUSPIRONE HYDROCHLORIDE 5 MG: 5 TABLET ORAL at 10:14

## 2021-12-09 RX ADMIN — RIFAXIMIN 550 MG: 550 TABLET ORAL at 10:14

## 2021-12-09 RX ADMIN — PANTOPRAZOLE SODIUM 40 MG: 40 INJECTION, POWDER, FOR SOLUTION INTRAVENOUS at 05:58

## 2021-12-09 RX ADMIN — ALBUMIN HUMAN 25 G: 0.25 SOLUTION INTRAVENOUS at 10:30

## 2021-12-09 NOTE — PROGRESS NOTES
" [facility]   Hospitalist Progress Note  Date: 2021  Patient Name: Nic Nicolas  : 1965  MRN: [id number]  Date of admission: 2021      Subjective   Subjective     Chief Complaint: SI and encphephalopathy    Summary: 56-year-old male with cirrhosis and other medical problems presented to the ED after reportedly telling his neighbor that he wanted to \"blow his brains out.\"  Per the admitting hospitalist, he was not able to get additional history as the patient was combative and spitting on nurses and physicians.  Ammonia was elevated to 131.  He was given an NG tube for lactulose but then pulled it out.  He was started on Rocephin and Zithromax for right lower lobe pneumonia.  He had worsening of his renal failure and nephrology was consulted.  He was admitted to the medicine service.    Interval Followup:   Patient awake alert oriented repeat blood cultures negative to date initial blood culture 1 of 2+ for gram-positive cocci suspect contaminant.  Urine culture positive for E. coli on appropriate antibiotics went for ultrasound-guided paracentesis today 5 L removed albumin given.  Overall patient doing better replacing electrolytes suspect can discharge home tomorrow  Review of Systems  All systems reviewed negative except the following: Patient complains of pain from NG tube and Zamora catheter    Objective   Objective     Vitals:   Temp:  [97.3 °F (36.3 °C)-98.6 °F (37 °C)] 98.1 °F (36.7 °C)  Heart Rate:  [70-77] 70  Resp:  [18] 18  BP: ()/(44-63) 136/59  Physical Exam    Constitutional: Awake alert oriented no acute distress   Eyes: atraumatic; pupils appear equal   HENT: NCAT, mucous membranes moist   Neck: Supple, no lad   Respiratory: no resp distress; no stridor   Cardiovascular: no m, reg rhythm   Gastrointestinal: Positive bowel sounds, nontender mildly distended and taut   musculoskeletal: Bilateral edema noted no clubbing or cyanosis to extremities   Psychiatric: Mood and " affect normal no suicidal ideations   neurologic: No focal deficits awake alert oriented   skin: No rashes     Result Review    Result Review:  I have personally reviewed the results from the time of this admission to 12/9/2021 12:16 EST and agree with these findings:  [x]  Laboratory  [x]  Microbiology  [x]  Radiology  []  EKG/Telemetry   []  Cardiology/Vascular   []  Pathology  []  Old records  []  Other:    Assessment/Plan   Assessment / Plan     Assessment:  Acute hepatic encephalopathy resolved  Suicidal comments suspect related to hepatic encephalopathy currently without any suicidal ideations  IVELISSE resolved  Right lower lobe pneumonia  Urinary tract infection secondary to E. coli continue Rocephin  Wanted to blood cultures positive for gram-positive cocci suspect contaminant started vancomycin repeat blood cultures  hepatic cirrhosis  Diabetes  Coagulopathy  Thrombocytopenia    Plan:  Completed ultrasound-guided paracentesis today 5 L removed fluid sent for studies  Albumin status post paracentesis  Hold lactulose if patient having greater than 2-3 soft bowel movements daily continue rifaximin  Continue Rocephin vancomycin can likely discontinue vancomycin  Replace potassium magnesium  Recheck labs in the morning  Continue oral diuretics with Lasix and Aldactone monitor renal function  Holding heparin for DVT prophylaxis given thrombocytopenia  GI consulted appreciate recommendations  Psychiatry consultation appreciated  Nephrology consultation appreciated  Discussed plan with RN.    DVT prophylaxis:  No DVT prophylaxis order currently exists.    CODE STATUS:        Electronically signed by SOLEDAD Oliva, 12/09/21, 12:20 PM EST.    .    Attending Note:  I have seen and examined the patient and agree with the above information from Kenny Woodard PA-C and have outlined plan of care.  56-year-old male with cirrhosis presented to the ED with significant hepatic encephalopathy.  Ammonia level was 131.  He  has improved with lactulose.  Paracentesis today, reportedly about 5 L off.  I did order albumin to be given as well.  Can discontinue Zithromax.  Plan to discharge home tomorrow if he is doing well.  On exam he still has abdominal distention but much better.  No distress.  Is awake alert and oriented.  Discussed with nephrologist  Electronically signed by Dominick Davila MD, 12/09/21, 4:29 PM EST.

## 2021-12-09 NOTE — PLAN OF CARE
Goal Outcome Evaluation:              Outcome Summary: VSS. Patient is alert and oriented. Patient complains of pain every 4 hours, morphine per eMAR. Patient is ambulating to bathroom by self. Patient got a paracentesis today, removed 5 L. Patient states he is feeling much better. No other complaints at this time.

## 2021-12-09 NOTE — PLAN OF CARE
Goal Outcome Evaluation:  Plan of Care Reviewed With: patient        Progress: no change  Outcome Summary: Patient alert and oriented, vss. Pt c/o pain that is not being adequately treated with current pain medication. Pt is ambulating to bathroom with standby, no sob, no s/s of distress noted. Adequate UOP, abdomen still distended. Plan for pericentesis today.

## 2021-12-09 NOTE — PROGRESS NOTES
[facility]     Nephrology Progress Note      Patient Name: Nic Nicolas  : 1965  MRN: [id number]  Primary Care Physician:  Britney Echevarria APRN  Date of admission: 2021    Subjective   Subjective     Interval History:  Patient Reports feeling better.  Increased abdominal girth.  No shortness of breath or chest pain.    Review of Systems   All systems were reviewed and negative except for: What is mentioned above.    Objective   Objective     Vitals:   Temp:  [98.1 °F (36.7 °C)-98.6 °F (37 °C)] 98.1 °F (36.7 °C)  Heart Rate:  [70-77] 70  Resp:  [18] 18  BP: ()/(44-63) 136/59  Physical Exam:   Constitutional: Awake, alert, not in distress, feeling better.   Eyes: sclerae anicteric, no conjunctival injection   HENT: mucous membranes moist   Neck: Supple, no thyromegaly, no lymphadenopathy, trachea midline, mild JVD   Respiratory: Clear to auscultation bilaterally, nonlabored respirations    Cardiovascular: RRR, no murmurs, rubs, or gallops.   Gastrointestinal: Positive bowel sounds, soft, right upper quadrant tenderness, ascites positive.   Musculoskeletal: +1 edema worse on the right side., no clubbing or cyanosis   Psychiatric: Appropriate affect, cooperative   Neurologic: Oriented x 3, moving all extremities, Cranial Nerves grossly intact, speech clear   Skin: warm and dry, no rashes    Zamora catheter in place.    Result Review    Result Reviewed:  I have personally reviewed the results from the time of this admission to 2021 15:30 EST and agree with these findings:  [x]  Laboratory  []  Microbiology  [x]  Radiology  []  EKG/Telemetry   []  Cardiology/Vascular   []  Pathology  []  Old records  []  Other:  Lab Results   Component Value Date    GLUCOSE 107 (H) 2021    CALCIUM 7.9 (L) 2021     2021    K 3.4 (L) 2021    CO2 25.3 2021     2021    BUN 15 2021    CREATININE 1.13 2021    EGFRIFNONA 67 2021    BCR 13.3  12/09/2021    ANIONGAP 8.7 12/09/2021     Lab Results   Component Value Date    CALCIUM 7.9 (L) 12/09/2021    PHOS 3.4 12/09/2021      Results from last 7 days   Lab Units 12/09/21  0444   MAGNESIUM mg/dL 1.4*              Assessment/Plan   Assessment / Plan       Active Hospital Problems:  Active Hospital Problems    Diagnosis    • Hepatic encephalopathy (HCC)        Assessment and Plan:    -Acute kidney injury in the setting of hypotension and possible mild volume depletion, resolving.  -E. coli UTI, treatment per primary.  -Liver cirrhosis secondary to Ruiz with hepatic encephalopathy and markedly elevated ammonia, better.  Mental status improved.  LVP planned today.  Would give albumin preprocedure.  -Abdominal pain, etiology is uncertain, per primary.  -Suicidal ideation, seems better, per psychiatry.  -Hypertension, blood pressure is acceptable.  Avoid hypotension.   -Diabetes with complications.  -Multifocal pneumonia, treatment per primary.  -Hypokalemia and hypomagnesemia, will replace both and recheck.  Discussed with primary.  Will follow.      Electronically signed by Lisbeth Rubin MD, 12/07/21, 1:42 PM EST.

## 2021-12-09 NOTE — PROGRESS NOTES
"Pharmacy to Dose Vancomycin Day: 2    Nic Nicolas is a 56 y.o.male admitted with suicidal ideation. Pharmacy has been consulted to dose IV Vancomycin     Consulting Provider: TERESA Woodard  Clinical Indication: Bacteremia  Pertinent Past Medical History: N/A   Goal -600 mg/L.hr  Duration of therapy: 7 days per consult    188 cm (74\")       12/09/21  0500      Weight: 135 kg (297 lb 2.9 oz)         Estimated Creatinine Clearance: 107.6 mL/min (by C-G formula based on SCr of 1.13 mg/dL).  Results from last 7 days   Lab Units 12/09/21  0444 12/08/21  0453 12/07/21  0445 12/06/21  0611   BUN mg/dL 15 18 22* 27*   CREATININE mg/dL 1.13 1.16 1.08 1.55*       HD/PD/CRRT?: No    Lab Results   Component Value Date    WBC 7.40 12/09/2021      Temperature    12/08/21 1845 12/08/21 2301 12/09/21 0256   Temp: 98.6 °F (37 °C) 98.4 °F (36.9 °C) 98.6 °F (37 °C)        Contrast Administered: No    Relevant Micro:   Microbiology Results (last 10 days)       Procedure Component Value - Date/Time    Blood Culture - Blood, Blood, Venous Line [[id number]]  (Abnormal) Collected: 12/05/21 1815    Lab Status: Preliminary result Specimen: Blood, Venous Line Updated: 12/09/21 0736     Blood Culture Gram Positive Cocci     Gram Stain Anaerobic Bottle Gram positive cocci in clusters      Aerobic Bottle Gram positive cocci in clusters    Blood Culture ID, PCR - Blood, Blood, Venous Line [[id number]] Collected: 12/05/21 1815    Lab Status: Final result Specimen: Blood, Venous Line Updated: 12/08/21 1235     BCID, PCR Negative by BCID PCR. Culture to Follow.     BOTTLE TYPE Aerobic Bottle    Blood Culture - Blood, Arm, Left [[id number]]  (Normal) Collected: 12/05/21 1814    Lab Status: Preliminary result Specimen: Blood from Arm, Left Updated: 12/08/21 1831     Blood Culture No growth at 3 days    COVID-19,CEPHEID/ANNE MARIE/BDMAX,COR/CATRACHITA/PAD/PAO IN-HOUSE(OR EMERGENT/ADD-ON),NP SWAB IN TRANSPORT MEDIA 3-4 HR TAT, RT-PCR - Swab, Nasopharynx " [[id number]]  (Normal) Collected: 12/05/21 1358    Lab Status: Final result Specimen: Swab from Nasopharynx Updated: 12/05/21 1734     COVID19 Not Detected    Narrative:      Fact sheet for providers: https://www.fda.gov/media/770817/download     Fact sheet for patients: https://www.fda.gov/media/714248/download  Presumptive Positive: additional testing may be indicated if it is necessary to differentiate between SARS-CoV-2 and other Sarbecovirus, for epidemiological purposes, or clinical management.    Urine Culture - Urine, Urine, Clean Catch [[id number]]  (Abnormal)  (Susceptibility) Collected: 12/05/21 1349    Lab Status: Final result Specimen: Urine, Clean Catch Updated: 12/08/21 0752     Urine Culture >100,000 CFU/mL Escherichia coli    Susceptibility      Escherichia coli  CRYSTAL  Ampicillin  Resistant  Ampicillin + Sulbactam  Susceptible  Cefazolin  Susceptible  Cefepime  Susceptible  Ceftazidime  Susceptible  Ceftriaxone  Susceptible  Gentamicin  Susceptible  Levofloxacin  Susceptible  Nitrofurantoin  Susceptible  Piperacillin + Tazobactam  Susceptible  Tetracycline  Resistant  Trimethoprim + Sulfamethoxazole  Resistant                          Relevant Radiology: Bilateral infiltrates persist.  The findings may represent infectious multifocal   pneumonia.  Interval placement of a nasogastric tube is noted.  Its distal portion is projected   below the diaphragm.      Other Antimicrobial Therapy: Azithromycin 500mg daily, Ceftriaxone 1000mg daily     Assessment/Plan  Loading dose: 3000mg   Regimen: 1250mg q12h   Exposure Target: AUC24 (range) 400-600 mg/L.hr  AUC24,ss: 508 mg/L.hr  PAUC*: 85 %  Ctrough,ss: 16.7 mg/L  Pconc*: 28 %  Tox.: 12 %    Note: Estimated AUC within therapeutic range. Will continue with current dose. Plan to recheck level in 2 to 3 days to reassess clearance and need for dose adjustments.     Level due: 12/12  Labs ordered: [facility] through 12/13.

## 2021-12-10 ENCOUNTER — APPOINTMENT (OUTPATIENT)
Dept: ULTRASOUND IMAGING | Facility: HOSPITAL | Age: 56
End: 2021-12-10

## 2021-12-10 LAB
ALBUMIN SERPL-MCNC: 2.6 G/DL (ref 3.5–5.2)
ANION GAP SERPL CALCULATED.3IONS-SCNC: 9.5 MMOL/L (ref 5–15)
BACTERIA SPEC AEROBE CULT: ABNORMAL
BACTERIA SPEC AEROBE CULT: NORMAL
BASOPHILS # BLD AUTO: 0.02 10*3/MM3 (ref 0–0.2)
BASOPHILS NFR BLD AUTO: 0.5 % (ref 0–1.5)
BUN SERPL-MCNC: 15 MG/DL (ref 6–20)
BUN/CREAT SERPL: 13.9 (ref 7–25)
CALCIUM SPEC-SCNC: 8 MG/DL (ref 8.6–10.5)
CHLORIDE SERPL-SCNC: 105 MMOL/L (ref 98–107)
CO2 SERPL-SCNC: 25.5 MMOL/L (ref 22–29)
CREAT SERPL-MCNC: 1.08 MG/DL (ref 0.76–1.27)
DEPRECATED RDW RBC AUTO: 47.1 FL (ref 37–54)
EOSINOPHIL # BLD AUTO: 0.04 10*3/MM3 (ref 0–0.4)
EOSINOPHIL NFR BLD AUTO: 1 % (ref 0.3–6.2)
ERYTHROCYTE [DISTWIDTH] IN BLOOD BY AUTOMATED COUNT: 15.3 % (ref 12.3–15.4)
GFR SERPL CREATININE-BSD FRML MDRD: 71 ML/MIN/1.73
GLUCOSE BLDC GLUCOMTR-MCNC: 132 MG/DL (ref 70–99)
GLUCOSE BLDC GLUCOMTR-MCNC: 174 MG/DL (ref 70–99)
GLUCOSE BLDC GLUCOMTR-MCNC: 175 MG/DL (ref 70–99)
GLUCOSE BLDC GLUCOMTR-MCNC: 176 MG/DL (ref 70–99)
GLUCOSE SERPL-MCNC: 149 MG/DL (ref 65–99)
GRAM STN SPEC: ABNORMAL
GRAM STN SPEC: ABNORMAL
HCT VFR BLD AUTO: 23.6 % (ref 37.5–51)
HGB BLD-MCNC: 7.9 G/DL (ref 13–17.7)
IMM GRANULOCYTES # BLD AUTO: 0.02 10*3/MM3 (ref 0–0.05)
IMM GRANULOCYTES NFR BLD AUTO: 0.5 % (ref 0–0.5)
INR PPP: 1.71 (ref 2–3)
LYMPHOCYTES # BLD AUTO: 0.83 10*3/MM3 (ref 0.7–3.1)
LYMPHOCYTES NFR BLD AUTO: 20 % (ref 19.6–45.3)
MAGNESIUM SERPL-MCNC: 1.5 MG/DL (ref 1.6–2.6)
MCH RBC QN AUTO: 29 PG (ref 26.6–33)
MCHC RBC AUTO-ENTMCNC: 33.5 G/DL (ref 31.5–35.7)
MCV RBC AUTO: 86.8 FL (ref 79–97)
MONOCYTES # BLD AUTO: 0.55 10*3/MM3 (ref 0.1–0.9)
MONOCYTES NFR BLD AUTO: 13.3 % (ref 5–12)
NEUTROPHILS NFR BLD AUTO: 2.68 10*3/MM3 (ref 1.7–7)
NEUTROPHILS NFR BLD AUTO: 64.7 % (ref 42.7–76)
NRBC BLD AUTO-RTO: 0 /100 WBC (ref 0–0.2)
PHOSPHATE SERPL-MCNC: 3 MG/DL (ref 2.5–4.5)
PLATELET # BLD AUTO: 76 10*3/MM3 (ref 140–450)
PMV BLD AUTO: 11.2 FL (ref 6–12)
POTASSIUM SERPL-SCNC: 3.6 MMOL/L (ref 3.5–5.2)
PROTHROMBIN TIME: 16.8 SECONDS (ref 9.4–12)
RBC # BLD AUTO: 2.72 10*6/MM3 (ref 4.14–5.8)
SODIUM SERPL-SCNC: 140 MMOL/L (ref 136–145)
WBC NRBC COR # BLD: 4.14 10*3/MM3 (ref 3.4–10.8)

## 2021-12-10 PROCEDURE — 63710000001 INSULIN DETEMIR PER 5 UNITS: Performed by: INTERNAL MEDICINE

## 2021-12-10 PROCEDURE — 25010000002 MAGNESIUM SULFATE 2 GM/50ML SOLUTION: Performed by: INTERNAL MEDICINE

## 2021-12-10 PROCEDURE — 25010000002 MORPHINE PER 10 MG: Performed by: GENERAL PRACTICE

## 2021-12-10 PROCEDURE — 85025 COMPLETE CBC W/AUTO DIFF WBC: CPT | Performed by: PHYSICIAN ASSISTANT

## 2021-12-10 PROCEDURE — 80069 RENAL FUNCTION PANEL: CPT | Performed by: PHYSICIAN ASSISTANT

## 2021-12-10 PROCEDURE — 25010000002 CEFTRIAXONE PER 250 MG: Performed by: PHYSICIAN ASSISTANT

## 2021-12-10 PROCEDURE — 76705 ECHO EXAM OF ABDOMEN: CPT

## 2021-12-10 PROCEDURE — 85610 PROTHROMBIN TIME: CPT | Performed by: PHYSICIAN ASSISTANT

## 2021-12-10 PROCEDURE — 99232 SBSQ HOSP IP/OBS MODERATE 35: CPT | Performed by: INTERNAL MEDICINE

## 2021-12-10 PROCEDURE — 82962 GLUCOSE BLOOD TEST: CPT

## 2021-12-10 PROCEDURE — 63710000001 INSULIN LISPRO (HUMAN) PER 5 UNITS: Performed by: STUDENT IN AN ORGANIZED HEALTH CARE EDUCATION/TRAINING PROGRAM

## 2021-12-10 PROCEDURE — 83735 ASSAY OF MAGNESIUM: CPT | Performed by: PHYSICIAN ASSISTANT

## 2021-12-10 RX ORDER — MAGNESIUM SULFATE HEPTAHYDRATE 40 MG/ML
2 INJECTION, SOLUTION INTRAVENOUS ONCE
Status: COMPLETED | OUTPATIENT
Start: 2021-12-10 | End: 2021-12-10

## 2021-12-10 RX ORDER — MAGNESIUM SULFATE HEPTAHYDRATE 40 MG/ML
2 INJECTION, SOLUTION INTRAVENOUS ONCE
Status: DISCONTINUED | OUTPATIENT
Start: 2021-12-10 | End: 2021-12-10

## 2021-12-10 RX ORDER — FUROSEMIDE 40 MG/1
120 TABLET ORAL
Status: DISCONTINUED | OUTPATIENT
Start: 2021-12-10 | End: 2021-12-11 | Stop reason: HOSPADM

## 2021-12-10 RX ADMIN — FUROSEMIDE 80 MG: 40 TABLET ORAL at 08:49

## 2021-12-10 RX ADMIN — LACTULOSE 20 G: 20 SOLUTION ORAL at 20:38

## 2021-12-10 RX ADMIN — SPIRONOLACTONE 100 MG: 25 TABLET ORAL at 17:28

## 2021-12-10 RX ADMIN — MAGNESIUM SULFATE 2 G: 2 INJECTION INTRAVENOUS at 09:06

## 2021-12-10 RX ADMIN — MORPHINE SULFATE 1 MG: 2 INJECTION, SOLUTION INTRAMUSCULAR; INTRAVENOUS at 02:31

## 2021-12-10 RX ADMIN — MORPHINE SULFATE 1 MG: 2 INJECTION, SOLUTION INTRAMUSCULAR; INTRAVENOUS at 17:33

## 2021-12-10 RX ADMIN — INSULIN DETEMIR 10 UNITS: 100 INJECTION, SOLUTION SUBCUTANEOUS at 20:38

## 2021-12-10 RX ADMIN — BUSPIRONE HYDROCHLORIDE 5 MG: 5 TABLET ORAL at 08:49

## 2021-12-10 RX ADMIN — PROPRANOLOL HYDROCHLORIDE 60 MG: 40 TABLET ORAL at 20:41

## 2021-12-10 RX ADMIN — MORPHINE SULFATE 1 MG: 2 INJECTION, SOLUTION INTRAMUSCULAR; INTRAVENOUS at 12:20

## 2021-12-10 RX ADMIN — PANTOPRAZOLE SODIUM 40 MG: 40 INJECTION, POWDER, FOR SOLUTION INTRAVENOUS at 05:31

## 2021-12-10 RX ADMIN — SPIRONOLACTONE 100 MG: 25 TABLET ORAL at 08:49

## 2021-12-10 RX ADMIN — MORPHINE SULFATE 1 MG: 2 INJECTION, SOLUTION INTRAMUSCULAR; INTRAVENOUS at 22:34

## 2021-12-10 RX ADMIN — SODIUM CHLORIDE, PRESERVATIVE FREE 10 ML: 5 INJECTION INTRAVENOUS at 20:41

## 2021-12-10 RX ADMIN — SODIUM CHLORIDE, PRESERVATIVE FREE 10 ML: 5 INJECTION INTRAVENOUS at 08:51

## 2021-12-10 RX ADMIN — RIFAXIMIN 550 MG: 550 TABLET ORAL at 20:38

## 2021-12-10 RX ADMIN — INSULIN LISPRO 2 UNITS: 100 INJECTION, SOLUTION INTRAVENOUS; SUBCUTANEOUS at 17:29

## 2021-12-10 RX ADMIN — INSULIN LISPRO 2 UNITS: 100 INJECTION, SOLUTION INTRAVENOUS; SUBCUTANEOUS at 12:20

## 2021-12-10 RX ADMIN — FUROSEMIDE 120 MG: 40 TABLET ORAL at 17:29

## 2021-12-10 RX ADMIN — BUSPIRONE HYDROCHLORIDE 5 MG: 5 TABLET ORAL at 20:38

## 2021-12-10 RX ADMIN — MORPHINE SULFATE 1 MG: 2 INJECTION, SOLUTION INTRAMUSCULAR; INTRAVENOUS at 07:34

## 2021-12-10 RX ADMIN — TAMSULOSIN HYDROCHLORIDE 0.4 MG: 0.4 CAPSULE ORAL at 20:38

## 2021-12-10 RX ADMIN — RIFAXIMIN 550 MG: 550 TABLET ORAL at 08:50

## 2021-12-10 RX ADMIN — PROPRANOLOL HYDROCHLORIDE 60 MG: 40 TABLET ORAL at 08:50

## 2021-12-10 RX ADMIN — BUSPIRONE HYDROCHLORIDE 5 MG: 5 TABLET ORAL at 17:29

## 2021-12-10 RX ADMIN — CEFTRIAXONE SODIUM 1 G: 1 INJECTION, SOLUTION INTRAVENOUS at 17:33

## 2021-12-10 NOTE — PROGRESS NOTES
[facility]     Nephrology Progress Note      Patient Name: Nic Nicolas  : 1965  MRN: [id number]  Primary Care Physician:  Britney Echevarria APRN  Date of admission: 2021    Subjective   Subjective     Interval History:  Patient Reports feeling better.  Status post large-volume paracentesis.  Mild abdominal discomfort right lower quadrant.  No shortness of breath or chest pain.    Review of Systems   All systems were reviewed and negative except for: What is mentioned above.    Objective   Objective     Vitals:   Temp:  [97.7 °F (36.5 °C)-98.7 °F (37.1 °C)] 97.9 °F (36.6 °C)  Heart Rate:  [70-78] 70  Resp:  [18-20] 20  BP: (109-143)/(47-65) 139/65  Physical Exam:   Constitutional: Awake, alert, not in distress, feeling better.   Eyes: sclerae anicteric, no conjunctival injection   HENT: mucous membranes moist   Neck: Supple, no thyromegaly, no lymphadenopathy, trachea midline, mild JVD   Respiratory: Clear to auscultation bilaterally, nonlabored respirations    Cardiovascular: RRR, no murmurs, rubs, or gallops.   Gastrointestinal: Positive bowel sounds, soft, right upper quadrant tenderness, ascites positive.   Musculoskeletal: +1 edema worse on the right side., no clubbing or cyanosis   Psychiatric: Appropriate affect, cooperative   Neurologic: Oriented x 3, moving all extremities, Cranial Nerves grossly intact, speech clear   Skin: warm and dry, no rashes    Zamora catheter in place.    Result Review    Result Reviewed:  I have personally reviewed the results from the time of this admission to 12/10/2021 13:41 EST and agree with these findings:  [x]  Laboratory  []  Microbiology  [x]  Radiology  []  EKG/Telemetry   []  Cardiology/Vascular   []  Pathology  []  Old records  []  Other:  Lab Results   Component Value Date    GLUCOSE 149 (H) 12/10/2021    CALCIUM 8.0 (L) 12/10/2021     12/10/2021    K 3.6 12/10/2021    CO2 25.5 12/10/2021     12/10/2021    BUN 15 12/10/2021     CREATININE 1.08 12/10/2021    EGFRIFNONA 71 12/10/2021    BCR 13.9 12/10/2021    ANIONGAP 9.5 12/10/2021     Lab Results   Component Value Date    CALCIUM 8.0 (L) 12/10/2021    PHOS 3.0 12/10/2021      Results from last 7 days   Lab Units 12/10/21  0451   MAGNESIUM mg/dL 1.5*      US Abdomen Limited    Result Date: 12/10/2021  PROCEDURE: US ABDOMEN LIMITED  COMPARISON: None  INDICATIONS: Cirrhosis  TECHNIQUE: Ultrasound examination of the right upper quadrant of the abdomen was performed.   FINDINGS:  Visualized pancreas appears within normal limits.  The liver is nodular and cirrhotic measuring 16 centimeters in length.  No definite liver mass is seen.  Liver is slightly heterogeneous.  There is mild to moderate perihepatic ascites.  The right kidney measures 14.5 centimeters and appears within normal limits.  Gallbladder demonstrates no definite cholelithiasis.  Common duct measures 4 millimeters.  Portal venous flow is normal hepatic veins are patent.  Spleen measures 17.4 centimeters in length.  CONCLUSION:  1. Nodular heterogeneous cirrhotic liver. 2. Splenomegaly.  Mild to moderate perihepatic ascites. 3. No definite cholelithiasis.      FABI DIAZ MD       ELECTRONICALLY SIGNED AND APPROVED BY: FABI DIAZ MD ON 12/10/2021 AT 10:22                  Assessment/Plan   Assessment / Plan       Active Hospital Problems:  Active Hospital Problems    Diagnosis    • Hepatic encephalopathy (HCC)        Assessment and Plan:    -Acute kidney injury in the setting of hypotension and possible mild volume depletion, resolving.  Increase Lasix to 80 mg p.o. 3 times daily (home dose) and continue with spironolactone 100 mg twice daily.  -E. coli UTI, treatment per primary.  -Liver cirrhosis secondary to Ruiz with hepatic encephalopathy and markedly elevated ammonia, better.  Mental status improved.  LVP 5 L yesterday after albumin..    -Abdominal pain, etiology is uncertain, per primary.  -Hypertension, blood  pressure is acceptable.  Avoid hypotension.   -Diabetes with complications.  -Multifocal pneumonia, treatment per primary.  -Hypokalemia and hypomagnesemia, both replaced.  Will give additional magnesium today.    Will follow.      Electronically signed by Lisbeth Rubin MD, 12/07/21, 1:42 PM EST.

## 2021-12-10 NOTE — PLAN OF CARE
Goal Outcome Evaluation:  Plan of Care Reviewed With: patient        Progress: no change  Outcome Summary: Pt NPO for abdominal US since midnight, pt had morphine q4hrs for pain (see MAR), pt reported multiple BMs and refused lactulose, pt resting comfortably in bed at this time

## 2021-12-10 NOTE — PROGRESS NOTES
" [facility]   Hospitalist Progress Note  Date: 12/10/2021  Patient Name: Nic Nicolas  : 1965  MRN: [id number]  Date of admission: 2021      Subjective   Subjective     Chief Complaint: SI and encphephalopathy    Summary: 56-year-old male with cirrhosis and other medical problems presented to the ED after reportedly telling his neighbor that he wanted to \"blow his brains out.\"  Per the admitting hospitalist, he was not able to get additional history as the patient was combative and spitting on nurses and physicians.  Ammonia was elevated to 131.  He was given an NG tube for lactulose but then pulled it out.  He was started on Rocephin and Zithromax for right lower lobe pneumonia.  He had worsening of his renal failure and nephrology was consulted.  He was admitted to the medicine service.    Interval Followup:   Patient seen and examined resting comfortably in bed mental status at baseline. Denies chest pains or shortness of air magnesium is low will replace intravenously. Continuing with lactulose and rifaximin. Continuing with Rocephin discontinued vancomycin as repeat blood cultures were negative suspect initial 1 of 2 blood culture was contaminant. Patient went for abdominal ultrasound today results are pending. Likely discharge home tomorrow pending final culture results from paracentesis. Small drop in hemoglobin check iron profile monitor H&H      Review of Systems  All systems reviewed negative except the following: Patient complains of pain from NG tube and Zamora catheter    Objective   Objective     Vitals:   Temp:  [97.7 °F (36.5 °C)-98.7 °F (37.1 °C)] 98.1 °F (36.7 °C)  Heart Rate:  [70-78] 70  Resp:  [18-20] 20  BP: (109-143)/(47-60) 135/60  Physical Exam    Constitutional: Awake alert oriented no acute distress   Eyes: atraumatic; pupils appear equal   HENT: NCAT, mucous membranes moist   Neck: Supple, no lad   Respiratory: no resp distress; no stridor   Cardiovascular: no m, reg " rhythm   Gastrointestinal: Positive bowel sounds, nontender mildly distended and taut   musculoskeletal: Bilateral edema noted no clubbing or cyanosis to extremities   Psychiatric: Mood and affect normal no suicidal ideations   neurologic: No focal deficits awake alert oriented   skin: No rashes     Result Review    Result Review:  I have personally reviewed the results from the time of this admission to 12/10/2021 10:11 EST and agree with these findings:  [x]  Laboratory  [x]  Microbiology  [x]  Radiology  []  EKG/Telemetry   []  Cardiology/Vascular   []  Pathology  []  Old records  []  Other:    Assessment/Plan   Assessment / Plan     Assessment:  Acute hepatic encephalopathy resolved  Suicidal comments suspect related to hepatic encephalopathy currently without any suicidal ideations  IVELISSE resolved  Right lower lobe pneumonia  Urinary tract infection secondary to E. coli continue Rocephin  Wanted to blood cultures positive for gram-positive cocci suspect contaminant started vancomycin repeat blood cultures  hepatic cirrhosis  Diabetes  Coagulopathy  Thrombocytopenia    Plan:  Small drop in hemoglobin monitor H&H check iron profile  Platelet count decreasing continue to monitor  Completed ultrasound-guided paracentesis today 5 L removed fluid sent for studies final culture results pending  Continue lactulose hold if patient having greater than 2-3 soft bowel movements daily continue rifaximin  Continue Rocephin   Discontinue vancomycin repeat blood cultures negative initial 1 of 2+ blood cultures likely contaminant  Replace magnesium  Recheck labs in the morning  Continue oral diuretics with Lasix and Aldactone monitor renal function  Holding heparin for DVT prophylaxis given thrombocytopenia  GI consulted appreciate recommendations  Psychiatry consultation appreciated  Nephrology consultation appreciated  Discussed plan with RN.    DVT prophylaxis:  No DVT prophylaxis order currently exists.    CODE STATUS:         Electronically signed by SOLEDAD Oliva, 12/10/21, 10:17 AM EST.    Attending Note:  I have seen and examined the patient and agree with the above information from Kenny Woodard PA-C and have outlined plan of care.  56-year-old male with cirrhosis presented to the ED with significant hepatic encephalopathy.  Ammonia level was 131.  He has improved with lactulose.  Got a paracentesis yesterday with albumin administered as well.  Clinically doing much better.  Mental status is improved.  Replacing some electrolytes today.  Lasix increased by nephrology.  We will monitor him today, if doing okay tomorrow can discharge home.  On exam he is awake and alert, abdomen nontender except for a mild amount of soreness around paracentesis site.  Electronically signed by Dominick Davila MD, 12/10/21, 4:51 PM EST.

## 2021-12-10 NOTE — PLAN OF CARE
Goal Outcome Evaluation:  Plan of Care Reviewed With: patient        Progress: no change  Outcome Summary: Patient had US. Patient request for morphine q4h for back/leg pain. patient has had multiple BM today, held morning lactulose. Removed dressing on paracentesis site, just bruised. Patient has no other complaints at this time.

## 2021-12-11 ENCOUNTER — READMISSION MANAGEMENT (OUTPATIENT)
Dept: CALL CENTER | Facility: HOSPITAL | Age: 56
End: 2021-12-11

## 2021-12-11 VITALS
RESPIRATION RATE: 20 BRPM | DIASTOLIC BLOOD PRESSURE: 56 MMHG | WEIGHT: 122.3 LBS | HEIGHT: 74 IN | OXYGEN SATURATION: 100 % | SYSTOLIC BLOOD PRESSURE: 109 MMHG | TEMPERATURE: 97.4 F | BODY MASS INDEX: 15.69 KG/M2 | HEART RATE: 71 BPM

## 2021-12-11 LAB
ALBUMIN SERPL-MCNC: 2.5 G/DL (ref 3.5–5.2)
ANION GAP SERPL CALCULATED.3IONS-SCNC: 8.1 MMOL/L (ref 5–15)
BASOPHILS # BLD AUTO: 0.02 10*3/MM3 (ref 0–0.2)
BASOPHILS NFR BLD AUTO: 0.5 % (ref 0–1.5)
BUN SERPL-MCNC: 15 MG/DL (ref 6–20)
BUN/CREAT SERPL: 15.5 (ref 7–25)
CALCIUM SPEC-SCNC: 8 MG/DL (ref 8.6–10.5)
CHLORIDE SERPL-SCNC: 100 MMOL/L (ref 98–107)
CO2 SERPL-SCNC: 26.9 MMOL/L (ref 22–29)
CREAT SERPL-MCNC: 0.97 MG/DL (ref 0.76–1.27)
DEPRECATED RDW RBC AUTO: 49.1 FL (ref 37–54)
EOSINOPHIL # BLD AUTO: 0.05 10*3/MM3 (ref 0–0.4)
EOSINOPHIL NFR BLD AUTO: 1.3 % (ref 0.3–6.2)
ERYTHROCYTE [DISTWIDTH] IN BLOOD BY AUTOMATED COUNT: 15.6 % (ref 12.3–15.4)
GFR SERPL CREATININE-BSD FRML MDRD: 80 ML/MIN/1.73
GLUCOSE BLDC GLUCOMTR-MCNC: 145 MG/DL (ref 70–99)
GLUCOSE BLDC GLUCOMTR-MCNC: 208 MG/DL (ref 70–99)
GLUCOSE SERPL-MCNC: 189 MG/DL (ref 65–99)
HCT VFR BLD AUTO: 25.8 % (ref 37.5–51)
HGB BLD-MCNC: 8.5 G/DL (ref 13–17.7)
IMM GRANULOCYTES # BLD AUTO: 0.02 10*3/MM3 (ref 0–0.05)
IMM GRANULOCYTES NFR BLD AUTO: 0.5 % (ref 0–0.5)
IRON 24H UR-MRATE: 68 MCG/DL (ref 59–158)
IRON SATN MFR SERPL: 33 % (ref 20–50)
LYMPHOCYTES # BLD AUTO: 0.8 10*3/MM3 (ref 0.7–3.1)
LYMPHOCYTES NFR BLD AUTO: 20.3 % (ref 19.6–45.3)
MAGNESIUM SERPL-MCNC: 1.5 MG/DL (ref 1.6–2.6)
MCH RBC QN AUTO: 29.2 PG (ref 26.6–33)
MCHC RBC AUTO-ENTMCNC: 32.9 G/DL (ref 31.5–35.7)
MCV RBC AUTO: 88.7 FL (ref 79–97)
MONOCYTES # BLD AUTO: 0.42 10*3/MM3 (ref 0.1–0.9)
MONOCYTES NFR BLD AUTO: 10.6 % (ref 5–12)
NEUTROPHILS NFR BLD AUTO: 2.64 10*3/MM3 (ref 1.7–7)
NEUTROPHILS NFR BLD AUTO: 66.8 % (ref 42.7–76)
NRBC BLD AUTO-RTO: 0 /100 WBC (ref 0–0.2)
PHOSPHATE SERPL-MCNC: 2.8 MG/DL (ref 2.5–4.5)
PLATELET # BLD AUTO: 66 10*3/MM3 (ref 140–450)
PMV BLD AUTO: 11 FL (ref 6–12)
POTASSIUM SERPL-SCNC: 3.6 MMOL/L (ref 3.5–5.2)
RBC # BLD AUTO: 2.91 10*6/MM3 (ref 4.14–5.8)
SODIUM SERPL-SCNC: 135 MMOL/L (ref 136–145)
TIBC SERPL-MCNC: 206 MCG/DL (ref 298–536)
TRANSFERRIN SERPL-MCNC: 138 MG/DL (ref 200–360)
WBC NRBC COR # BLD: 3.95 10*3/MM3 (ref 3.4–10.8)

## 2021-12-11 PROCEDURE — 80069 RENAL FUNCTION PANEL: CPT | Performed by: PHYSICIAN ASSISTANT

## 2021-12-11 PROCEDURE — 99239 HOSP IP/OBS DSCHRG MGMT >30: CPT | Performed by: INTERNAL MEDICINE

## 2021-12-11 PROCEDURE — 84466 ASSAY OF TRANSFERRIN: CPT | Performed by: PHYSICIAN ASSISTANT

## 2021-12-11 PROCEDURE — 82962 GLUCOSE BLOOD TEST: CPT

## 2021-12-11 PROCEDURE — 83540 ASSAY OF IRON: CPT | Performed by: PHYSICIAN ASSISTANT

## 2021-12-11 PROCEDURE — 85025 COMPLETE CBC W/AUTO DIFF WBC: CPT | Performed by: PHYSICIAN ASSISTANT

## 2021-12-11 PROCEDURE — 25010000002 MORPHINE PER 10 MG: Performed by: GENERAL PRACTICE

## 2021-12-11 PROCEDURE — 25010000002 MAGNESIUM SULFATE 2 GM/50ML SOLUTION: Performed by: PHYSICIAN ASSISTANT

## 2021-12-11 PROCEDURE — 63710000001 INSULIN LISPRO (HUMAN) PER 5 UNITS: Performed by: STUDENT IN AN ORGANIZED HEALTH CARE EDUCATION/TRAINING PROGRAM

## 2021-12-11 PROCEDURE — 83735 ASSAY OF MAGNESIUM: CPT | Performed by: PHYSICIAN ASSISTANT

## 2021-12-11 RX ORDER — SPIRONOLACTONE 100 MG/1
100 TABLET, FILM COATED ORAL 2 TIMES DAILY
Qty: 60 TABLET | Refills: 0 | Status: SHIPPED | OUTPATIENT
Start: 2021-12-11 | End: 2022-02-10 | Stop reason: SDUPTHER

## 2021-12-11 RX ORDER — MAGNESIUM SULFATE HEPTAHYDRATE 40 MG/ML
2 INJECTION, SOLUTION INTRAVENOUS ONCE
Status: COMPLETED | OUTPATIENT
Start: 2021-12-11 | End: 2021-12-11

## 2021-12-11 RX ORDER — PROPRANOLOL HCL 60 MG
60 CAPSULE, EXTENDED RELEASE 24HR ORAL 2 TIMES DAILY
Status: DISCONTINUED | OUTPATIENT
Start: 2021-12-11 | End: 2021-12-11 | Stop reason: HOSPADM

## 2021-12-11 RX ORDER — PEN NEEDLE, DIABETIC 30 GX3/16"
1 NEEDLE, DISPOSABLE MISCELLANEOUS 4 TIMES DAILY
Qty: 100 EACH | Refills: 0 | Status: SHIPPED | OUTPATIENT
Start: 2021-12-11 | End: 2021-12-14 | Stop reason: SDUPTHER

## 2021-12-11 RX ORDER — BLOOD-GLUCOSE METER
1 KIT MISCELLANEOUS 4 TIMES DAILY
Qty: 1 EACH | Refills: 0 | Status: SHIPPED | OUTPATIENT
Start: 2021-12-11 | End: 2021-12-14

## 2021-12-11 RX ORDER — FUROSEMIDE 40 MG/1
120 TABLET ORAL 2 TIMES DAILY
Qty: 90 TABLET | Refills: 0 | Status: SHIPPED | OUTPATIENT
Start: 2021-12-11 | End: 2022-02-10 | Stop reason: SDUPTHER

## 2021-12-11 RX ORDER — CEFPODOXIME PROXETIL 100 MG/1
100 TABLET, FILM COATED ORAL 2 TIMES DAILY
Qty: 4 TABLET | Refills: 0 | Status: SHIPPED | OUTPATIENT
Start: 2021-12-11 | End: 2021-12-14 | Stop reason: SDUPTHER

## 2021-12-11 RX ADMIN — MORPHINE SULFATE 1 MG: 2 INJECTION, SOLUTION INTRAMUSCULAR; INTRAVENOUS at 07:34

## 2021-12-11 RX ADMIN — MAGNESIUM SULFATE 2 G: 2 INJECTION INTRAVENOUS at 08:31

## 2021-12-11 RX ADMIN — FUROSEMIDE 120 MG: 40 TABLET ORAL at 08:31

## 2021-12-11 RX ADMIN — BUSPIRONE HYDROCHLORIDE 5 MG: 5 TABLET ORAL at 08:30

## 2021-12-11 RX ADMIN — MORPHINE SULFATE 1 MG: 2 INJECTION, SOLUTION INTRAMUSCULAR; INTRAVENOUS at 03:06

## 2021-12-11 RX ADMIN — PROPRANOLOL HYDROCHLORIDE 60 MG: 60 CAPSULE, EXTENDED RELEASE ORAL at 08:45

## 2021-12-11 RX ADMIN — RIFAXIMIN 550 MG: 550 TABLET ORAL at 08:31

## 2021-12-11 RX ADMIN — MORPHINE SULFATE 1 MG: 2 INJECTION, SOLUTION INTRAMUSCULAR; INTRAVENOUS at 11:43

## 2021-12-11 RX ADMIN — INSULIN LISPRO 3 UNITS: 100 INJECTION, SOLUTION INTRAVENOUS; SUBCUTANEOUS at 11:43

## 2021-12-11 RX ADMIN — SPIRONOLACTONE 100 MG: 25 TABLET ORAL at 08:31

## 2021-12-11 RX ADMIN — PANTOPRAZOLE SODIUM 40 MG: 40 INJECTION, POWDER, FOR SOLUTION INTRAVENOUS at 06:19

## 2021-12-11 RX ADMIN — SODIUM CHLORIDE, PRESERVATIVE FREE 10 ML: 5 INJECTION INTRAVENOUS at 08:45

## 2021-12-11 NOTE — PLAN OF CARE
Goal Outcome Evaluation:  Plan of Care Reviewed With: patient        Progress: no change  Outcome Summary: pt has c/o pain in back and hip and prn meds given with relief, pt stated he was worried about going home and not having a glucometer and needles for his insulin pen and I let him know we would make the md aware this am, vss, paracentesis site cdi, will continue to monitor.

## 2021-12-11 NOTE — OUTREACH NOTE
Prep Survey      Responses   [facility] patient discharged from? [facility]   Is LACE score < 7 ? No   Emergency Room discharge w/ pulse ox? No   Eligibility [facility]   Date of Admission 12/05/21   Date of Discharge 12/11/21   Discharge Disposition Home or Self Care   Discharge diagnosis Hepatic encephalopathy    Does the patient have one of the following disease processes/diagnoses(primary or secondary)? Other   Does the patient have Home health ordered? No   Is there a DME ordered? No   Prep survey completed? Yes          Taty Navarrete RN

## 2021-12-11 NOTE — DISCHARGE INSTRUCTIONS
Spironolactone Oral Tablets  What is this medicine?  SPIRONOLACTONE (srinivasa on oh LAK tone) is a diuretic. It helps you make more urine and to lose salt and excess water from your body. It treats swelling from heart, kidney, or liver disease. It treats high blood pressure. It also treats high aldosterone levels in the blood.  This medicine may be used for other purposes; ask your health care provider or pharmacist if you have questions.  COMMON BRAND NAME(S): Aldactone  What should I tell my health care provider before I take this medicine?  They need to know if you have any of these conditions:  · [place]'s disease or low adrenal gland function  · high blood level of potassium  · kidney disease  · liver disease  · an unusual or allergic reaction to spironolactone, other medicines, foods, dyes, or preservatives  · pregnant or trying to get pregnant  · breast-feeding  How should I use this medicine?  Take this medicine by mouth. Take it as directed on the prescription label at the same time every day. You can take it with or without food. You should always take it the same way. Keep taking it unless your health care provider tells you to stop.  Talk to your health care provider about the use of this drug in children. Special care may be needed.  Overdosage: If you think you have taken too much of this medicine contact a poison control center or emergency room at once.  NOTE: This medicine is only for you. Do not share this medicine with others.  What if I miss a dose?  If you miss a dose, take it as soon as you can. If it is almost time for your next dose, take only that dose. Do not take double or extra doses.  What may interact with this medicine?  Do not take this medicine with any of the following medications:  · cidofovir  · eplerenone  · tranylcypromine  This medicine may also interact with the following medications:  · aspirin  · certain medicines for blood pressure or heart disease like benazepril, lisinopril,  losartan, valsartan  · certain medicines that treat or prevent blood clots like heparin and enoxaparin  · cholestyramine  · cyclosporine  · digoxin  · lithium  · medicines that relax muscles for surgery  · NSAIDs, medicines for pain and inflammation, like ibuprofen or naproxen  · other diuretics  · potassium salts or supplements  · steroid medicines like prednisone or cortisone  · trimethoprim  This list may not describe all possible interactions. Give your health care provider a list of all the medicines, herbs, non-prescription drugs, or dietary supplements you use. Also tell them if you smoke, drink alcohol, or use illegal drugs. Some items may interact with your medicine.  What should I watch for while using this medicine?  Visit your doctor or health care provider for regular checks on your progress. Check your blood pressure as directed. Ask your health care provider what your blood pressure should be. Also, find out when you should contact him or her.  Do not treat yourself for coughs, colds, or pain while you are using this medicine without asking your health care provider for advice. Some medicines may increase your blood pressure.  Check with your health care provider if you have severe diarrhea, nausea, and vomiting, or if you sweat a lot. The loss of too much body fluid may make it dangerous for you to take this medicine.  You may need to be on a special diet while taking this medicine. Ask your health care provider. Also, find out how many glasses of fluid you need to drink each day.  You may get drowsy or dizzy. Do not drive, use machinery, or do anything that needs mental alertness until you know how this medicine affects you. Do not stand or sit up quickly, especially if you are an older patient. This reduces the risk of dizzy or fainting spells. Alcohol may interfere with the effects of this medicine. Avoid alcoholic drinks.  Avoid salt substitutes unless you are told otherwise by your health care  provider.  What side effects may I notice from receiving this medicine?  Side effects that you should report to your health care provider as soon as possible:  · allergic reactions (skin rash, itching or hives, swelling of the face, lips, or tongue)  · breast enlargement in both males and females  · changes in menstrual cycle  · gout (severe pain, redness, or swelling in joints like the big toe)  · high blood sugar (increased hunger, thirst or urination; unusually weak or tired, blurry vision)  · high potassium levels (chest pain; fast irregular heartbeat; muscle weakness)  · kidney injury (trouble passing urine or change in the amount of urine)  · low blood pressure (dizziness; feeling faint or lightheaded, falls; unusually weak or tired)  · low calcium levels (fast heartbeat; muscle cramps or pain; pain, tingling, or numbness in the hands or feet; seizures)  · low magnesium levels (fast, irregular heartbeat; feeling faint or lightheaded, falls; muscle cramps or pain)  Side effects that usually do not require medical attention (report to your health care provider if they continue or are bothersome):  · changes in sex drive or performance  · dizziness  · headache  · upset stomach  This list may not describe all possible side effects. Call your doctor for medical advice about side effects. You may report side effects to FDA at 7-204-FDA-8859.  Where should I keep my medicine?  Keep out of the reach of children and pets.  Store below 25 degrees C (77 degrees F). Get rid of any unused medicine after the expiration date.  To get rid of medicines that are no longer needed or have :  · Take the medicine to a medicine take-back program. Check with your pharmacy or law enforcement to find a location.  · If you cannot return the medicine, check the label or package insert to see if the medicine should be thrown out in the garbage or flushed down the toilet. If you are not sure, ask your health care provider. If it is  safe to put into the trash, take the medicine out of the container. Mix the medicine with cat litter, dirt, coffee grounds, or other unwanted substance. Seal the mixture in a bag or container. Put it in the trash.  NOTE: This sheet is a summary. It may not cover all possible information. If you have questions about this medicine, talk to your doctor, pharmacist, or health care provider.  © 2021 [employer] (2021-03-05 19:57:34)  Rifaximin tablets  What is this medicine?  RIFAXIMIN (ri FAX i men) is an antibiotic. It is used to treat traveler's diarrhea and irritable bowel syndrome with diarrhea. It is also used to prevent hepatic encephalopathy, a brain disorder that can occur due to severe liver disease.  This medicine may be used for other purposes; ask your health care provider or pharmacist if you have questions.  COMMON BRAND NAME(S): Xifaxan  What should I tell my health care provider before I take this medicine?  They need to know if you have any of these conditions:  · bloody or tarry stools  · fever  · liver disease  · an unusual or allergic reaction to rifaximin, rifampin, rifabutin, other medicines, foods, dyes, or preservatives  · pregnant or trying to get pregnant  · breast-feeding  How should I use this medicine?  Take this medicine by mouth with a glass of water. Follow the directions on the prescription label. This medicine may be taken with or without food. Take your medicine at regular intervals. Do not take your medicine more often than directed. Take all of your medicine as directed even if you think your are better. Do not skip doses or stop your medicine early.  Talk to your pediatrician regarding the use of this medicine in children. Special care may be needed.  Overdosage: If you think you have taken too much of this medicine contact a poison control center or emergency room at once.  NOTE: This medicine is only for you. Do not share this medicine with others.  What if I miss a  dose?  If you miss a dose, take it as soon as you can. If it is almost time for your next dose, take only that dose. Do not take double or extra doses.  What may interact with this medicine?  This medicine may interact with the following medications:  · birth control pills  · cyclosporine  · midazolam  · verapamil  · warfarin  This list may not describe all possible interactions. Give your health care provider a list of all the medicines, herbs, non-prescription drugs, or dietary supplements you use. Also tell them if you smoke, drink alcohol, or use illegal drugs. Some items may interact with your medicine.  What should I watch for while using this medicine?  Tell your doctor or healthcare professional if your symptoms do not start to get better or if they get worse.  Do not treat diarrhea with over the counter products. Contact your doctor if you have diarrhea that lasts more than 2 days or if it is severe and watery.  What side effects may I notice from receiving this medicine?  Side effects that you should report to your doctor or health care professional as soon as possible:  · allergic reactions like skin rash, itching or hives, swelling of the face, lips, or tongue  · blood in the urine  · bloody or watery diarrhea  · fever  Side effects that usually do not require medical attention (report to your doctor or health care professional if they continue or are bothersome):  · constipation  · headache  · nausea, vomiting  · stomach bloating, gas  · urgent bowel movements  This list may not describe all possible side effects. Call your doctor for medical advice about side effects. You may report side effects to FDA at 7-106-LQX-5996.  Where should I keep my medicine?  Keep out of the reach of children.  Store at room temperature between 15 and 30 degrees C (59 and 86 degrees F). Throw away any unused medicine after the expiration date.  NOTE: This sheet is a summary. It may not cover all possible information. If  you have questions about this medicine, talk to your doctor, pharmacist, or health care provider.  © 2021 [employer] Standard (2017-12-20 10:31:29)  Propranolol Tablets  What is this medicine?  PROPRANOLOL (proe PRAN oh lole) is a beta blocker. It decreases the amount of work your heart has to do and helps your heart beat regularly. It treats high blood pressure and/or prevent chest pain (also called angina). It is also used after a heart attack to prevent a second one.  This medicine may be used for other purposes; ask your health care provider or pharmacist if you have questions.  COMMON BRAND NAME(S): Inderal  What should I tell my health care provider before I take this medicine?  They need to know if you have any of these conditions:  · circulation problems or blood vessel disease  · diabetes  · history of heart attack or heart disease, vasospastic angina  · kidney disease  · liver disease  · lung or breathing disease, like asthma or emphysema  · pheochromocytoma  · slow heart rate  · thyroid disease  · an unusual or allergic reaction to propranolol, other beta-blockers, medicines, foods, dyes, or preservatives  · pregnant or trying to get pregnant  · breast-feeding  How should I use this medicine?  Take this drug by mouth. Take it as directed on the prescription label at the same time every day. Keep taking it unless your health care provider tells you to stop.  Talk to your health care provider about the use of this drug in children. Special care may be needed.  Overdosage: If you think you have taken too much of this medicine contact a poison control center or emergency room at once.  NOTE: This medicine is only for you. Do not share this medicine with others.  What if I miss a dose?  If you miss a dose, take it as soon as you can. If it is almost time for your next dose, take only that dose. Do not take double or extra doses.  What may interact with this medicine?  Do not take this medicine with any of  the following medications:  · feverfew  · phenothiazines like chlorpromazine, mesoridazine, prochlorperazine, thioridazine  This medicine may also interact with the following medications:  · aluminum hydroxide gel  · antipyrine  · antiviral medicines for HIV or AIDS  · barbiturates like phenobarbital  · certain medicines for blood pressure, heart disease, irregular heart beat  · cimetidine  · ciprofloxacin  · diazepam  · fluconazole  · haloperidol  · isoniazid  · medicines for cholesterol like cholestyramine or colestipol  · medicines for mental depression  · medicines for migraine headache like almotriptan, eletriptan, frovatriptan, naratriptan, rizatriptan, sumatriptan, zolmitriptan  · NSAIDs, medicines for pain and inflammation, like ibuprofen or naproxen  · phenytoin  · rifampin  · teniposide  · theophylline  · thyroid medicines  · tolbutamide  · warfarin  · zileuton  This list may not describe all possible interactions. Give your health care provider a list of all the medicines, herbs, non-prescription drugs, or dietary supplements you use. Also tell them if you smoke, drink alcohol, or use illegal drugs. Some items may interact with your medicine.  What should I watch for while using this medicine?  Visit your doctor or health care professional for regular check ups. Check your blood pressure and pulse rate regularly. Ask your health care professional what your blood pressure and pulse rate should be, and when you should contact them.  You may get drowsy or dizzy. Do not drive, use machinery, or do anything that needs mental alertness until you know how this drug affects you. Do not stand or sit up quickly, especially if you are an older patient. This reduces the risk of dizzy or fainting spells. Alcohol can make you more drowsy and dizzy. Avoid alcoholic drinks.  This medicine may increase blood sugar. Ask your healthcare provider if changes in diet or medicines are needed if you have diabetes.  Do not treat  yourself for coughs, colds, or pain while you are taking this medicine without asking your doctor or health care professional for advice. Some ingredients may increase your blood pressure.  What side effects may I notice from receiving this medicine?  Side effects that you should report to your doctor or health care professional as soon as possible:  · allergic reactions like skin rash, itching or hives, swelling of the face, lips, or tongue  · breathing problems  · cold hands or feet  · difficulty sleeping, nightmares  · dry peeling skin  · hallucinations  · muscle cramps or weakness  · signs and symptoms of high blood sugar such as being more thirsty or hungry or having to urinate more than normal. You may also feel very tired or have blurry vision.  · slow heart rate  · swelling of the legs and ankles  · vomiting  Side effects that usually do not require medical attention (report to your doctor or health care professional if they continue or are bothersome):  · change in sex drive or performance  · diarrhea  · dry sore eyes  · hair loss  · nausea  · weak or tired  This list may not describe all possible side effects. Call your doctor for medical advice about side effects. You may report side effects to FDA at 2-583-FDA-5985.  Where should I keep my medicine?  Keep out of the reach of children and pets.  Store at room temperature between 20 and 25 degrees C (68 and 77 degrees F). Protect from light. Throw away any unused drug after the expiration date.  NOTE: This sheet is a summary. It may not cover all possible information. If you have questions about this medicine, talk to your doctor, pharmacist, or health care provider.  © 2021 [employer] (2020-07-24 19:25:51)  Furosemide Oral Tablets  What is this medicine?  FUROSEMIDE (fyoor [place] se mide) is a diuretic. It helps you make more urine and to lose salt and excess water from your body. It treats swelling from heart, kidney, or liver disease. It also  treats high blood pressure.  This medicine may be used for other purposes; ask your health care provider or pharmacist if you have questions.  COMMON BRAND NAME(S): Active-Medicated Specimen Kit, Delone, Diuscreen, Lasix, RX Specimen Collection Kit, Specimen Collection Kit, URINX Medicated Specimen Collection  What should I tell my health care provider before I take this medicine?  They need to know if you have any of these conditions:  · abnormal blood electrolytes  · diarrhea or vomiting  · gout  · heart disease  · kidney disease, small amounts of urine, or difficulty passing urine  · liver disease  · thyroid disease  · an unusual or allergic reaction to furosemide, sulfa drugs, other medicines, foods, dyes, or preservatives  · pregnant or trying to get pregnant  · breast-feeding  How should I use this medicine?  Take this drug by mouth. Take it as directed on the prescription label at the same time every day. You can take it with or without food. If it upsets your stomach, take it with food. Keep taking it unless your health care provider tells you to stop.  Talk to your health care provider about the use of this drug in children. Special care may be needed.  Overdosage: If you think you have taken too much of this medicine contact a poison control center or emergency room at once.  NOTE: This medicine is only for you. Do not share this medicine with others.  What if I miss a dose?  If you miss a dose, take it as soon as you can. If it is almost time for your next dose, take only that dose. Do not take double or extra doses.  What may interact with this medicine?  · aspirin and aspirin-like medicines  · certain antibiotics  · chloral hydrate  · cisplatin  · cyclosporine  · digoxin  · diuretics  · laxatives  · lithium  · medicines for blood pressure  · medicines that relax muscles for surgery  · methotrexate  · NSAIDs, medicines for pain and inflammation like ibuprofen, naproxen, or  indomethacin  · phenytoin  · steroid medicines like prednisone or cortisone  · sucralfate  · thyroid hormones  This list may not describe all possible interactions. Give your health care provider a list of all the medicines, herbs, non-prescription drugs, or dietary supplements you use. Also tell them if you smoke, drink alcohol, or use illegal drugs. Some items may interact with your medicine.  What should I watch for while using this medicine?  Visit your doctor or health care providerfor regular checks on your progress. Check your blood pressure regularly. Ask your doctor or health care providerwhat your blood pressure should be, and when you should contact him or her. If you are a diabetic, check your blood sugar as directed.  This medicine may cause serious skin reactions. They can happen weeks to months after starting the medicine. Contact your health care provider right away if you notice fevers or flu-like symptoms with a rash. The rash may be red or purple and then turn into blisters or peeling of the skin. Or, you might notice a red rash with swelling of the face, lips or lymph nodes in your neck or under your arms.  You may need to be on a special diet while taking this medicine. Check with your doctor. Also, ask how many glasses of fluid you need to drink a day. You must not get dehydrated.  You may get drowsy or dizzy. Do not drive, use machinery, or do anything that needs mental alertness until you know how this drug affects you. Do not stand or sit up quickly, especially if you are an older patient. This reduces the risk of dizzy or fainting spells. Alcohol can make you more drowsy and dizzy. Avoid alcoholic drinks.  This medicine can make you more sensitive to the sun. Keep out of the sun. If you cannot avoid being in the sun, wear protective clothing and use sunscreen. Do not use sun lamps or tanning beds/booths.  What side effects may I notice from receiving this medicine?  Side effects that you  should report to your doctor or health care professional as soon as possible:  · blood in urine or stools  · dry mouth  · fever or chills  · hearing loss or ringing in the ears  · irregular heartbeat  · muscle pain or weakness, cramps  · rash, fever, and swollen lymph nodes  · redness, blistering, peeling or loosening of the skin, including inside the mouth  · skin rash  · stomach upset, pain, or nausea  · tingling or numbness in the hands or feet  · unusually weak or tired  · vomiting or diarrhea  · yellowing of the eyes or skin  Side effects that usually do not require medical attention (report to your doctor or health care professional if they continue or are bothersome):  · headache  · loss of appetite  · unusual bleeding or bruising  This list may not describe all possible side effects. Call your doctor for medical advice about side effects. You may report side effects to FDA at 8-310-FDA-9611.  Where should I keep my medicine?  Keep out of the reach of children and pets.  Store at room temperature between 20 and 25 degrees C (68 and 77 degrees F). Protect from light and moisture. Keep the container tightly closed. Throw away any unused drug after the expiration date.  NOTE: This sheet is a summary. It may not cover all possible information. If you have questions about this medicine, talk to your doctor, pharmacist, or health care provider.  © 2021 [employer] Standard (2020-08-04 18:01:32)  Cefpodoxime Tablets  What is this medicine?  CEFPODOXIME (sef pode OX eem) is a cephalosporin antibiotic. It treats some infections caused by bacteria. It will not work for colds, the flu, or other viruses.  This medicine may be used for other purposes; ask your health care provider or pharmacist if you have questions.  COMMON BRAND NAME(S): Vantin  What should I tell my health care provider before I take this medicine?  They need to know if you have any of these conditions:  · bleeding problems  · bowel disease, like  colitis  · kidney disease  · other chronic illness  · an unusual or allergic reaction to cefpodoxime, other cephalosporin antibiotics, penicillin, penicillamine, other foods, dyes or preservatives  · pregnant or trying to get pregnant  · breast-feeding  How should I use this medicine?  Take this drug by mouth. Take it as directed on the prescription label at the same time every day. Take it with food. Take all of this drug unless your health care provider tells you to stop it early. Keep taking it even if you think you are better.  Talk to your health care provider about the use of this drug in children. While it may be prescribed for selected conditions, precautions do apply.  Overdosage: If you think you have taken too much of this medicine contact a poison control center or emergency room at once.  NOTE: This medicine is only for you. Do not share this medicine with others.  What if I miss a dose?  If you miss a dose, take it as soon as you can. If it is almost time for your next dose, take only that dose. Do not take double or extra doses.  What may interact with this medicine?  · antacids  · diuretics  · medicines for stomach acid or ulcer like cimetidine, famotidine, lansoprazole, nizatidine, omeprazole, ranitidine  · probenecid  · sodium bicarbonate  This list may not describe all possible interactions. Give your health care provider a list of all the medicines, herbs, non-prescription drugs, or dietary supplements you use. Also tell them if you smoke, drink alcohol, or use illegal drugs. Some items may interact with your medicine.  What should I watch for while using this medicine?  Tell your doctor or health care provider if your symptoms do not improve or if you get new symptoms.  This medicine may cause serious skin reactions. They can happen weeks to months after starting the medicine. Contact your health care provider right away if you notice fevers or flu-like symptoms with a rash. The rash may be red  or purple and then turn into blisters or peeling of the skin. Or, you might notice a red rash with swelling of the face, lips or lymph nodes in your neck or under your arms.  Do not treat diarrhea with over the counter products. Contact your doctor if you have diarrhea that lasts more than 2 days or if it is severe and watery.  What side effects may I notice from receiving this medicine?  Side effects that you should report to your doctor or health care professional as soon as possible:  · allergic reactions like skin rash, itching or hives, swelling of the face, lips, or tongue  · breathing problems  · confused, nervous, shaky  · fast, irregular heartbeat  · redness, blistering, peeling or loosening of the skin, including inside the mouth  · unusually weak or tired  · vaginal irritation, discharge  Side effects that usually do not require medical attention (report to your doctor or health care professional if they continue or are bothersome):  · diarrhea  · dizzy, drowsy  · dry mouth  · headache  · joint or muscle pain  · stomach upset, gas  · trouble sleeping  · vomiting  This list may not describe all possible side effects. Call your doctor for medical advice about side effects. You may report side effects to FDA at 3-433-FDA-1863.  Where should I keep my medicine?  Keep out of the reach of children and pets.  Store at room temperature between 20 and 25 degrees C (68 and 77 degrees F). Throw away any unused drug after the expiration date.  NOTE: This sheet is a summary. It may not cover all possible information. If you have questions about this medicine, talk to your doctor, pharmacist, or health care provider.  © 2021 [employer]vier/Gold Standard (2020-07-23 14:47:54)

## 2021-12-11 NOTE — DISCHARGE SUMMARY
"               [facility]         HOSPITALIST  DISCHARGE SUMMARY    Patient Name: Nic Nicolas  : 1965  MRN: [id number]    Date of Admission: 2021  Date of Discharge: 2021  Primary Care Physician: Britney Echevarria APRN  Reason for admission:  Confusion    Final diagnosis:  Acute hepatic encephalopathy resolved  Suicidal comments suspect related to hepatic encephalopathy currently without any suicidal ideations  IVELISSE resolved  Right lower lobe pneumonia  Urinary tract infection secondary to E. coli  1 of 2 blood cultures positive for Streptococcus alphahemolytic suspect contaminant repeat blood cultures negative  hepatic cirrhosis  Diabetes  Coagulopathy  Thrombocytopenia secondary to cirrhosis    Consults     Date and Time Order Name Status Description    2021 10:13 AM Inpatient Psychiatrist Consult      2021  6:22 PM Inpatient Nephrology Consult Completed     2021  6:22 PM Inpatient Gastroenterology Consult Completed     2021  5:24 PM Hospitalist (on-call MD unless specified)            Active and Resolved Hospital Problems:  Active Hospital Problems    Diagnosis POA   • Hepatic encephalopathy (HCC) [K72.90] Yes      Resolved Hospital Problems   No resolved problems to display.       Hospital Course     Hospital Course:  Nic Nicolas is a 56 y.o. male  with cirrhosis and other medical problems presented to the ED after reportedly telling his neighbor that he wanted to \"blow his brains out.\"  Per the admitting hospitalist, he was not able to get additional history as the patient was combative and spitting on nurses and physicians.  Ammonia was elevated to 131.  He was given an NG tube for lactulose but then pulled it out.  He was started on Rocephin and Zithromax for right lower lobe pneumonia.  He had worsening of his renal failure and nephrology was consulted.  He was admitted to the medicine service.   Patient was maintained on lactulose and rifaximin mental " status improved ammonia level improved.  Diuretics initially held renal function improved to baseline.  Was noted to have E. coli urinary tract infection there was also concern for pneumonia completed course of antibiotics with Rocephin and Zithromax.  Patient with abdominal pain and ascites on exam concern for SBP paracentesis performed 5 L fluid removed cultures negative.  His renal function improved and and diuretics resumed additionally started patient on low-salt diet with oral fluid restriction to minimize edema and ascites.  Today discharge patient is hemodynamically stable mental status is at baseline renal function stable he will be discharged with orders to follow-up with his primary care provider 5 to 7 days will need repeat BMP at that time.  Patient complete additional 2 days of antibiotics.  Patient's been instructed to titrate lactulose for 2-3 soft bowel movements daily.  Patient should return the emergency department medially for any worsening symptoms        DISCHARGE Follow Up Recommendations for labs and diagnostics: PCP 1 week      Day of Discharge     Vital Signs:  Temp:  [97.7 °F (36.5 °C)-98 °F (36.7 °C)] 97.9 °F (36.6 °C)  Heart Rate:  [73-80] 74  Resp:  [20] 20  BP: ()/(58-72) 132/64  Physical Exam:              Constitutional: Awake alert oriented no acute distress              Eyes: atraumatic; pupils appear equal              HENT: NCAT, mucous membranes moist              Neck: Supple, no lad              Respiratory: no resp distress; no stridor              Cardiovascular: no m, reg rhythm              Gastrointestinal: Positive bowel sounds, nontender mildly distended               musculoskeletal: Bilateral edema noted no clubbing or cyanosis to extremities              Psychiatric: Mood and affect normal no suicidal ideations              neurologic: No focal deficits awake alert oriented              skin: No rashes          Discharge Details        Discharge Medications       New Medications      Instructions Start Date   cefpodoxime 100 MG tablet  Commonly known as: VANTIN   100 mg, Oral, 2 Times Daily      FreeStyle Lite device   1 Device, Does not apply, 4 Times Daily      Pen Needles 32G X 4 MM misc   1 each, Subcutaneous, 4 Times Daily      riFAXIMin 550 MG tablet  Commonly known as: XIFAXAN   550 mg, Oral, Every 12 Hours Scheduled         Changes to Medications      Instructions Start Date   furosemide 40 MG tablet  Commonly known as: LASIX  What changed:   how much to take  when to take this   120 mg, Oral, 2 Times Daily      propranolol 20 MG tablet  Commonly known as: INDERAL  What changed: how much to take   60 mg, Oral, 2 Times Daily      spironolactone 100 MG tablet  Commonly known as: ALDACTONE  What changed:   how much to take  when to take this   100 mg, Oral, 2 Times Daily         Continue These Medications      Instructions Start Date   albuterol sulfate  (90 Base) MCG/ACT inhaler  Commonly known as: PROVENTIL HFA;VENTOLIN HFA;PROAIR HFA   2 puffs, Inhalation, Every 4 Hours PRN      Alcohol Prep pads   1 pad, Does not apply, 5 Times Daily      amLODIPine 10 MG tablet  Commonly known as: NORVASC   10 mg, Oral, Daily      baclofen 10 MG tablet  Commonly known as: LIORESAL   10 mg, Oral, 3 Times Daily      Blood Glucose Test strip   1 strip, In Vitro, 5 Times Daily      busPIRone 5 MG tablet  Commonly known as: BUSPAR   5 mg, Oral, 3 Times Daily      freestyle lancets   1 each, Other, As Needed      glimepiride 1 MG tablet  Commonly known as: Amaryl   1 mg, Oral, Every Morning Before Breakfast      Insulin Glargine 100 UNIT/ML injection pen  Commonly known as: LANTUS SOLOSTAR   66 Units, Subcutaneous, Daily      lactulose 10 GM/15ML solution  Commonly known as: CHRONULAC   20 g, Oral, 2 Times Daily      Morphine 15 MG tablet  Commonly known as: MSIR   15 mg, Oral, Every 8 Hours PRN      omeprazole 20 MG capsule  Commonly known as: priLOSEC   20 mg, Oral, Daily       ondansetron ODT 4 MG disintegrating tablet  Commonly known as: ZOFRAN-ODT   4 mg, Sublingual, 4 Times Daily PRN      oxyCODONE-acetaminophen  MG per tablet  Commonly known as: PERCOCET   1 tablet, Oral, Every 8 Hours PRN, bottle empty       Potassium 99 MG tablet   99 mg, Mouth/Throat, 2 Times Daily      sertraline 50 MG tablet  Commonly known as: ZOLOFT   50 mg, Oral, Daily         Stop These Medications    cyclobenzaprine 10 MG tablet  Commonly known as: FLEXERIL     ketorolac 10 MG tablet  Commonly known as: TORADOL     meclizine 25 MG tablet  Commonly known as: ANTIVERT            No Known Allergies    Discharge Disposition:  Home or Self Care    Diet:  Hospital:  Diet Order   Procedures   • Diet Regular; Low Sodium, Consistent Carbohydrate       Discharge Activity: Advance slowly as tolerated      CODE STATUS:  There are no questions and answers to display.         Future Appointments   Date Time Provider Department Center   12/14/2021 11:30 AM Britney Echevarria APRN [facility] IMP ETWN [facility]   12/15/2021 10:00 AM Rain Roblero RDN, LD [facility] NS ET [facility]   12/16/2021  9:30 AM Liliya Muñoz RN, BSN  PA[facility] D C [facility]   1/24/2022  3:15 PM Erin Chavez NP [facility] GE ETW PAO           Pertinent  and/or Most Recent Results     PROCEDURES:     PROCEDURE:  XR CHEST 1 VW     COMPARISON: [facility], CR, XR CHEST 1 VW, 11/18/2021, 11:24.     INDICATIONS:  Rule out pneumonia, verify NG tube placement     FINDINGS:          There is no nasogastric tube imaged.  The heart size and pulmonary vessels are normal.  There is   patchy airspace disease in the periphery of the left midlung and in throughout the right lung   consistent with multifocal pneumonia.  The osseous structures are normal for age.     CONCLUSION: Mild diffuse right lung and left midlung alveolar airspace disease consistent with   pneumonia.       PROCEDURE:  CT HEAD WO CONTRAST     COMPARISON:  [facility], CT, CT HEAD WO  CONTRAST, 11/16/2021, 11:33.  INDICATIONS:  trauma     PROTOCOL:     Standard imaging protocol performed                 RADIATION:      DLP: 1082.2 mGy*cm               MA and/or KV was adjusted to minimize radiation dose.                     TECHNIQUE:    After obtaining the patient's consent, CT images were obtained without non-ionic   intravenous contrast material.      FINDINGS:          There is mild diffuse generalized atrophy.  There is no mass or mass effect.  There are no abnormal   extra-axial fluid collections.  There are no areas of acute hemorrhage.  There is minimal right   maxillary sinus disease.     CONCLUSION: No acute intracranial abnormality            RACHELL DEMPSEY MD         PROCEDURE:  US ABDOMEN LIMITED     COMPARISON: None     INDICATIONS:  Cirrhosis     TECHNIQUE:    Ultrasound examination of the right upper quadrant of the abdomen was performed.       FINDINGS:          Visualized pancreas appears within normal limits.  The liver is nodular and cirrhotic measuring 16   centimeters in length.  No definite liver mass is seen.  Liver is slightly heterogeneous.  There is   mild to moderate perihepatic ascites.  The right kidney measures 14.5 centimeters and appears   within normal limits.  Gallbladder demonstrates no definite cholelithiasis.  Common duct measures 4   millimeters.  Portal venous flow is normal hepatic veins are patent.  Spleen measures 17.4   centimeters in length.     CONCLUSION:   1. Nodular heterogeneous cirrhotic liver.  2. Splenomegaly.  Mild to moderate perihepatic ascites.  3. No definite cholelithiasis.         PROCEDURE:  IR PARACENTESIS WITH IMAGING     COMPARISON: [facility], , IR PARACENTESIS WITH IMAGING, 12/06/2021, 12:55.     INDICATIONS:  Ascites. 5 LITERS OF CLEAR DARK YELLOW FLUID REMOVED. 5FR 7CM YUEH USED. 2 IMAGES.     CONSENT:        Prior to the procedure, the risks, benefits and alternatives were thoroughly explained.    This included the  risk of bleeding, infection, injury to associated structures, and risk of bowel   injury.  The patient expressed understanding and wished to continue.  Informed written consent was   obtained.     FINDINGS:        Preliminary ultrasound demonstrated appropriate fluid in the right mid abdomen.  The   overlying skin was prepped and draped in normal sterile fashion.  Lidocaine was injected along the   anticipated tract of the needle.   A 5 Czech 7 cm [employer] catheter was advanced into the peritoneal   space.  5 liters clear dark fluid was aspirated.   The catheter was removed without complication.    Albumin was to be given by the referring clinician.  Sample sent to the lab for analysis.        CONCLUSION: Successful ultrasound-guided therapeutic and diagnostic paracentesis without evidence   of complication.    LAB RESULTS:      Lab 12/11/21  0519 12/10/21  0451 12/09/21  0444 12/08/21  0453 12/07/21  0445 12/06/21  0611 12/06/21  0611   WBC 3.95 4.14 7.40 7.54 9.44   < > 9.45   HEMOGLOBIN 8.5* 7.9* 8.7* 9.3* 9.0*   < > 9.1*   HEMATOCRIT 25.8* 23.6* 25.7* 27.7* 27.6*   < > 25.7*   PLATELETS 66* 76* 91* 108* 91*   < > 100*   NEUTROS ABS 2.64 2.68 5.03 5.35 6.93   < > 7.35*   IMMATURE GRANS (ABS) 0.02 0.02 0.10* 0.14* 0.33*   < > 0.13*   LYMPHS ABS 0.80 0.83 1.40 1.23 1.27   < > 1.07   MONOS ABS 0.42 0.55 0.75 0.71 0.77   < > 0.81   EOS ABS 0.05 0.04 0.09 0.06 0.07   < > 0.05   MCV 88.7 86.8 86.5 86.8 89.3   < > 82.9   PROCALCITONIN  --   --   --   --   --   --  1.71*   PROTIME  --  16.8* 16.1* 15.5* 17.0*  --   --     < > = values in this interval not displayed.         Lab 12/11/21  0519 12/10/21  0451 12/09/21  0444 12/08/21 0453 12/07/21  0445 12/06/21  0611 12/06/21  0611   SODIUM 135* 140 137 139 141   < > 138   POTASSIUM 3.6 3.6 3.4* 3.3* 3.7   < > 3.8   CHLORIDE 100 105 103 107 108*   < > 105   CO2 26.9 25.5 25.3 22.9 22.3   < > 21.8*   ANION GAP 8.1 9.5 8.7 9.1 10.7   < > 11.2   BUN 15 15 15 18 22*   < > 27*    CREATININE 0.97 1.08 1.13 1.16 1.08   < > 1.55*   GLUCOSE 189* 149* 107* 191* 105*   < > 103*   CALCIUM 8.0* 8.0* 7.9* 8.0* 7.9*   < > 8.5*   MAGNESIUM 1.5* 1.5* 1.4* 1.7  --   --  1.7   PHOSPHORUS 2.8 3.0 3.4 3.6  --   --  3.5   TSH  --   --   --   --   --   --  6.090*    < > = values in this interval not displayed.         Lab 12/11/21  0519 12/10/21  0451 12/09/21  0444 12/08/21  0453 12/07/21  0445 12/06/21  0611 12/06/21  0611 12/05/21  1325 12/05/21  1325   TOTAL PROTEIN  --   --   --  5.3* 5.1*  --  5.7*  --  5.6*   ALBUMIN 2.50* 2.60* 2.30* 2.30* 2.20*   < > 2.50*   < > 2.60*   GLOBULIN  --   --   --   --  2.9  --  3.2  --  3.0   ALT (SGPT)  --   --   --  12 11  --  11  --  11   AST (SGOT)  --   --   --  41* 39  --  33  --  28   BILIRUBIN  --   --   --  1.6* 2.6*  --  2.3*  --  2.2*   INDIRECT BILIRUBIN  --   --   --  0.8  --   --   --   --   --    BILIRUBIN DIRECT  --   --   --  0.8*  --   --   --   --   --    ALK PHOS  --   --   --  141* 131*  --  141*  --  157*    < > = values in this interval not displayed.         Lab 12/10/21  0451 12/09/21  0444 12/08/21  0453 12/07/21  0445 12/05/21  1325   PROBNP  --   --   --   --  734.2   PROTIME 16.8* 16.1* 15.5* 17.0*  --    INR 1.71* 1.63* 1.57* 1.73*  --              Lab 12/11/21  0519 12/08/21  0453   IRON 68  --    IRON SATURATION 33  --    TIBC 206*  --    TRANSFERRIN 138*  --    FOLATE  --  8.46   VITAMIN B 12  --  1,658*         Brief Urine Lab Results  (Last result in the past 365 days)      Color   Clarity   Blood   Leuk Est   Nitrite   Protein   CREAT   Urine HCG        12/06/21 1750             90.1             Microbiology Results (last 10 days)     Procedure Component Value - Date/Time    Body Fluid Culture - Body Fluid, Peritoneum [[id number]] Collected: 12/09/21 0925    Lab Status: Preliminary result Specimen: Body Fluid from Peritoneum Updated: 12/11/21 0834     Body Fluid Culture No growth at 2 days     Gram Stain Few (2+) WBCs seen      No  organisms seen    Blood Culture - Blood, Hand, Right [[id number]]  (Normal) Collected: 12/08/21 1007    Lab Status: Preliminary result Specimen: Blood from Hand, Right Updated: 12/11/21 1030     Blood Culture No growth at 3 days    Blood Culture - Blood, Arm, Left [[id number]]  (Normal) Collected: 12/08/21 1007    Lab Status: Preliminary result Specimen: Blood from Arm, Left Updated: 12/11/21 1030     Blood Culture No growth at 3 days    Blood Culture - Blood, Blood, Venous Line [[id number]]  (Abnormal) Collected: 12/05/21 1815    Lab Status: Final result Specimen: Blood, Venous Line Updated: 12/10/21 1202     Blood Culture Streptococcus, Alpha Hemolytic     Gram Stain Anaerobic Bottle Gram positive cocci in clusters      Aerobic Bottle Gram positive cocci in clusters    Narrative:      Probable contaminant requires clinical correlation, susceptibility not performed unless requested by physician.      Blood Culture ID, PCR - Blood, Blood, Venous Line [[id number]] Collected: 12/05/21 1815    Lab Status: Final result Specimen: Blood, Venous Line Updated: 12/08/21 1235     BCID, PCR Negative by BCID PCR. Culture to Follow.     BOTTLE TYPE Aerobic Bottle    Blood Culture - Blood, Arm, Left [[id number]]  (Normal) Collected: 12/05/21 1814    Lab Status: Final result Specimen: Blood from Arm, Left Updated: 12/10/21 1830     Blood Culture No growth at 5 days    COVID-19,CEPHEID/ANNE MARIE/BDMAX,COR/CATRACHITA/PAD/PAO IN-HOUSE(OR EMERGENT/ADD-ON),NP SWAB IN TRANSPORT MEDIA 3-4 HR TAT, RT-PCR - Swab, Nasopharynx [[id number]]  (Normal) Collected: 12/05/21 1358    Lab Status: Final result Specimen: Swab from Nasopharynx Updated: 12/05/21 1734     COVID19 Not Detected    Narrative:      Fact sheet for providers: https://www.fda.gov/media/035463/download     Fact sheet for patients: https://www.fda.gov/media/495195/download  Presumptive Positive: additional testing may be indicated if it is necessary to differentiate between SARS-CoV-2 and other  Sarbecovirus, for epidemiological purposes, or clinical management.    Urine Culture - Urine, Urine, Clean Catch [[id number]]  (Abnormal)  (Susceptibility) Collected: 12/05/21 1349    Lab Status: Final result Specimen: Urine, Clean Catch Updated: 12/08/21 0752     Urine Culture >100,000 CFU/mL Escherichia coli    Susceptibility      Escherichia coli     CRYSTAL     Ampicillin Resistant     Ampicillin + Sulbactam Susceptible     Cefazolin Susceptible     Cefepime Susceptible     Ceftazidime Susceptible     Ceftriaxone Susceptible     Gentamicin Susceptible     Levofloxacin Susceptible     Nitrofurantoin Susceptible     Piperacillin + Tazobactam Susceptible     Tetracycline Resistant     Trimethoprim + Sulfamethoxazole Resistant                                                Labs Pending at Discharge:  Pending Labs     Order Current Status    Non-gynecologic Cytology In process    Blood Culture - Blood, Arm, Left Preliminary result    Blood Culture - Blood, Hand, Right Preliminary result    Body Fluid Culture - Body Fluid, Peritoneum Preliminary result            Time spent on Discharge including face to face service: Greater than 30 minutes     Electronically signed by SOLEDAD Oliva, 12/11/21, 11:25 AM EST.    Condition at discharge: Stable for discharge      Attending Note:  I have seen and examined the patient on the day of discharge and agree with above per Mr. Yamilet PA-C.  Very pleasant 56-year-old male who presented with hepatic encephalopathy, had been compliant with lactulose that did not know to titrate it to the correct number of bowel movements.  He is done much better.  Completed course of antibiotics.  Got paracentesis.  Had diuretics adjusted.  Stable for discharge home today.  Patient to followup with primary care provider.  Electronically signed by Dominick Davila MD, 12/11/21, 8:12 PM EST.

## 2021-12-12 LAB
BACTERIA FLD CULT: NORMAL
GRAM STN SPEC: NORMAL
GRAM STN SPEC: NORMAL

## 2021-12-13 ENCOUNTER — TRANSITIONAL CARE MANAGEMENT TELEPHONE ENCOUNTER (OUTPATIENT)
Dept: CALL CENTER | Facility: HOSPITAL | Age: 56
End: 2021-12-13

## 2021-12-13 LAB
BACTERIA SPEC AEROBE CULT: NORMAL
BACTERIA SPEC AEROBE CULT: NORMAL
CYTO UR: NORMAL
LAB AP CASE REPORT: NORMAL
LAB AP CLINICAL INFORMATION: NORMAL
PATH REPORT.FINAL DX SPEC: NORMAL
PATH REPORT.GROSS SPEC: NORMAL
STAT OF ADQ CVX/VAG CYTO-IMP: NORMAL

## 2021-12-13 NOTE — OUTREACH NOTE
Call Center TCM Note      Responses   [facility] patient discharged from? [facility]   Does the patient have one of the following disease processes/diagnoses(primary or secondary)? Other   TCM attempt successful? Yes   Call start time 0839   Call end time 0916   Discharge diagnosis Hepatic encephalopathy    Meds reviewed with patient/caregiver? Yes   Is the patient having any side effects they believe may be caused by any medication additions or changes? No   Does the patient have all medications ordered at discharge? No   What is keeping the patient from filling the prescriptions? Pre-authorization in progress,  Prior authorization Issues   Nursing Interventions Nurse called pharmacy   Notified Case Management Script issues   Prescription comments Patient communicates that he was unable to receive any of his prescriptions due to issues with PA/coverage--it appears there is a PA in process according to CM notes for Xifaxin. ..key #[id number], this RN researched Cover My  Meds and it appears is still pending determination.  Pt also communicates he was unable to receive his glucometer as prescribed.  This RN    Is the patient taking all medications as directed (includes completed medication regime)? Yes   Medication comments Pt understands importance of checking his BG prior to insulin, working on obtaining a meter.  Taking his diuretics and lactulose as ordered   Comments regarding appointments Enc'd to call and reschedule his diabetes mgmt appt that he missed while hospitalized   Does the patient have a primary care provider?  Yes   Does the patient have an appointment with their PCP within 7 days of discharge? Yes   Comments regarding PCP Patient has an appt on 12/14/21--not scheduled as a hospital f/u as prescheduled appt--will alert staff   Has the patient kept scheduled appointments due by today? Yes   Has home health visited the patient within 72 hours of discharge? N/A   Has all DME been delivered? No   DME  comments Pt needs a glucometer--attempted to call his pharmacy x 2 to determine the issue with coverage and unable tor reach.  This RN did discuss option of purchase of glucometer kit from [employer] while he waits for his insurance issues to be settled (usually $20 or less for [employer] brand)--also routed DM RN LUKAS for advice.    Did the patient receive a copy of their discharge instructions? Yes   Nursing interventions Reviewed instructions with patient   What is the patient's perception of their health status since discharge? Improving  [Patient reports he feels improved since discharge---offers no concerns except with his frustrations with  obtaining his medications and glucometer.  He seems motivated at present time and taking his medications as ordered. ]   Is the patient/caregiver able to teach back signs and symptoms related to disease process for when to call PCP? Yes   Is the patient/caregiver able to teach back signs and symptoms related to disease process for when to call 911? Yes   TCM call completed? Yes          Araceli Loya RN    12/13/2021, 09:16 EST

## 2021-12-14 ENCOUNTER — OFFICE VISIT (OUTPATIENT)
Dept: INTERNAL MEDICINE | Facility: CLINIC | Age: 56
End: 2021-12-14

## 2021-12-14 VITALS
WEIGHT: 283.13 LBS | TEMPERATURE: 98.3 F | BODY MASS INDEX: 36.34 KG/M2 | SYSTOLIC BLOOD PRESSURE: 129 MMHG | HEIGHT: 74 IN | OXYGEN SATURATION: 93 % | HEART RATE: 107 BPM | DIASTOLIC BLOOD PRESSURE: 84 MMHG

## 2021-12-14 DIAGNOSIS — R11.0 NAUSEA: ICD-10-CM

## 2021-12-14 DIAGNOSIS — R18.8 CIRRHOSIS OF LIVER WITH ASCITES, UNSPECIFIED HEPATIC CIRRHOSIS TYPE (HCC): ICD-10-CM

## 2021-12-14 DIAGNOSIS — I10 HYPERTENSION, UNSPECIFIED TYPE: ICD-10-CM

## 2021-12-14 DIAGNOSIS — I82.509 MULTIPLE EPISODES OF DEEP VENOUS THROMBOSIS (HCC): ICD-10-CM

## 2021-12-14 DIAGNOSIS — E83.42 HYPOMAGNESEMIA: ICD-10-CM

## 2021-12-14 DIAGNOSIS — E11.9 DIABETES MELLITUS WITHOUT COMPLICATION (HCC): ICD-10-CM

## 2021-12-14 DIAGNOSIS — N39.0 URINARY TRACT INFECTION WITHOUT HEMATURIA, SITE UNSPECIFIED: ICD-10-CM

## 2021-12-14 DIAGNOSIS — G89.4 CHRONIC PAIN SYNDROME: ICD-10-CM

## 2021-12-14 DIAGNOSIS — J18.9 PNEUMONIA OF RIGHT LOWER LOBE DUE TO INFECTIOUS ORGANISM: ICD-10-CM

## 2021-12-14 DIAGNOSIS — K76.82 HEPATIC ENCEPHALOPATHY (HCC): Primary | ICD-10-CM

## 2021-12-14 DIAGNOSIS — K74.60 CIRRHOSIS OF LIVER WITH ASCITES, UNSPECIFIED HEPATIC CIRRHOSIS TYPE (HCC): ICD-10-CM

## 2021-12-14 LAB
GLUCOSE BLDC GLUCOMTR-MCNC: 329 MG/DL (ref 70–130)
INR PPP: 1.7 (ref 0.9–1.1)

## 2021-12-14 PROCEDURE — 1111F DSCHRG MED/CURRENT MED MERGE: CPT | Performed by: INTERNAL MEDICINE

## 2021-12-14 PROCEDURE — 85610 PROTHROMBIN TIME: CPT | Performed by: INTERNAL MEDICINE

## 2021-12-14 PROCEDURE — 82962 GLUCOSE BLOOD TEST: CPT | Performed by: INTERNAL MEDICINE

## 2021-12-14 PROCEDURE — 36416 COLLJ CAPILLARY BLOOD SPEC: CPT | Performed by: INTERNAL MEDICINE

## 2021-12-14 PROCEDURE — 99214 OFFICE O/P EST MOD 30 MIN: CPT | Performed by: INTERNAL MEDICINE

## 2021-12-14 RX ORDER — CEFPODOXIME PROXETIL 100 MG/1
100 TABLET, FILM COATED ORAL 2 TIMES DAILY
Qty: 4 TABLET | Refills: 0 | Status: SHIPPED | OUTPATIENT
Start: 2021-12-14 | End: 2021-12-16

## 2021-12-14 RX ORDER — BLOOD-GLUCOSE METER
1 KIT MISCELLANEOUS ONCE
Qty: 1 EACH | Refills: 0 | Status: SHIPPED | OUTPATIENT
Start: 2021-12-14 | End: 2021-12-14

## 2021-12-14 RX ORDER — ONDANSETRON 4 MG/1
4 TABLET, ORALLY DISINTEGRATING ORAL 4 TIMES DAILY PRN
Qty: 60 TABLET | Refills: 0 | Status: SHIPPED | OUTPATIENT
Start: 2021-12-14 | End: 2022-02-15 | Stop reason: SDUPTHER

## 2021-12-14 RX ORDER — PEN NEEDLE, DIABETIC 30 GX3/16"
1 NEEDLE, DISPOSABLE MISCELLANEOUS 4 TIMES DAILY
Qty: 100 EACH | Refills: 0 | Status: SHIPPED | OUTPATIENT
Start: 2021-12-14 | End: 2022-01-13

## 2021-12-14 RX ORDER — PROPRANOLOL HYDROCHLORIDE 20 MG/1
60 TABLET ORAL 2 TIMES DAILY
Qty: 180 TABLET | Refills: 1 | Status: SHIPPED | OUTPATIENT
Start: 2021-12-14 | End: 2022-01-30 | Stop reason: HOSPADM

## 2021-12-14 RX ORDER — MAGNESIUM OXIDE 400 MG/1
400 TABLET ORAL DAILY
Qty: 90 TABLET | Refills: 1 | Status: ON HOLD | OUTPATIENT
Start: 2021-12-14 | End: 2022-01-23

## 2021-12-14 RX ORDER — LACTULOSE 10 G/15ML
20 SOLUTION ORAL 2 TIMES DAILY
Qty: 1892 ML | Refills: 3 | Status: SHIPPED | OUTPATIENT
Start: 2021-12-14 | End: 2022-02-10 | Stop reason: SDUPTHER

## 2021-12-14 RX ORDER — LANCETS 28 GAUGE
1 EACH MISCELLANEOUS AS NEEDED
Qty: 100 EACH | Refills: 3 | Status: SHIPPED | OUTPATIENT
Start: 2021-12-14 | End: 2022-02-24

## 2021-12-14 RX ORDER — MORPHINE SULFATE 15 MG/1
15 TABLET ORAL EVERY 8 HOURS PRN
Qty: 21 TABLET | Refills: 0 | Status: SHIPPED | OUTPATIENT
Start: 2021-12-16 | End: 2021-12-27 | Stop reason: SDUPTHER

## 2021-12-14 RX ORDER — GLUCOSAMINE HCL/CHONDROITIN SU 500-400 MG
1 CAPSULE ORAL
Qty: 100 EACH | Refills: 3 | Status: SHIPPED | OUTPATIENT
Start: 2021-12-14 | End: 2022-02-24

## 2021-12-14 NOTE — PROGRESS NOTES
Transitional Care Follow Up Visit  Subjective     Nic Nicolas is a 56 y.o. male who presents for a transitional care management visit.    Within 48 business hours after discharge our office contacted him via telephone to coordinate his care and needs.      I reviewed and discussed the details of that call along with the discharge summary, hospital problems, inpatient lab results, inpatient diagnostic studies, and consultation reports with Nic.     Current outpatient and discharge medications have been reconciled for the patient.  Reviewed by: Jonh Franco Jr., MD      Date of TCM Phone Call 12/11/2021   [facility]   Date of Admission 12/5/2021   Date of Discharge 12/11/2021   Discharge Disposition Home or Self Care     Risk for Readmission (LACE) Score: 13 (12/11/2021  6:01 AM)      History of Present Illness     Course During Hospital Stay:    Patient admitted for hepatic encephalopathy. Patient also found to have RLL pneumonia and UTI. Patient unable to get Antibiotics as prescribed.  Cirrhosis - patient understand to take lactulose with titration for 3-5 BMs per day. Patient needs refill on lactulose.   History of DVTs- patient needs follow-up with hematology. Patient reports takign coumadin 10mg daily.   chronic pain-Patient has upcoming appointment with pain management on 12/20/21  DM2- unable to take insulin recently due out of needles. Patient needs refill.      The following portions of the patient's history were reviewed and updated as appropriate: allergies, current medications, past family history, past medical history, past social history, past surgical history, and problem list.      Objective   Vitals:    12/14/21 1146   BP: 129/84   Pulse: 107   Temp: 98.3 °F (36.8 °C)   SpO2: 93%       Appearance: No acute distress, well-nourished  Head: normocephalic, atraumatic  Eyes: extraocular movements intact, no scleral icterus, no conjunctival injection  Ears, Nose, and  Throat: external ears normal, nares patent, moist mucous membranes  Cardiovascular: regular rate and rhythm. no murmurs, rales, or rhonchi. no edema  Respiratory: breathing comfortably, symmetric chest rise, clear to auscultation bilaterally. No wheezes, rales, or rhonchi.  Neuro: alert and oriented to time, place, and person. Normal gait  Psych: normal mood and affect       Assessment/Plan     Diagnoses and all orders for this visit:    1. Hepatic encephalopathy (HCC) (Primary)  Comments:  titrate lactulose for 3-5 BMs per day. pt demonstrates understanding. pt has f/u with GI.     2. Diabetes mellitus without complication (HCC)  Comments:  on lantus 66U daily. would like to potentnially start SGLT-2 for extra diuresis as well as GLP-1 for control with less insulin.  Orders:  -     Insulin Pen Needle (Pen Needles) 32G X 4 MM misc; Inject 1 each under the skin into the appropriate area as directed 4 (Four) Times a Day for 30 days.  Dispense: 100 each; Refill: 0  -     glucose monitor monitoring kit; 1 each 1 (One) Time for 1 dose.  Dispense: 1 each; Refill: 0  -     Glucose Blood (Blood Glucose Test) strip; Inject 1 strip under the skin into the appropriate area as directed 5 (Five) Times a Day.  Dispense: 100 each; Refill: 3  -     Lancets (freestyle) lancets; 1 each by Other route As Needed (glucose check).  Dispense: 100 each; Refill: 3    3. Cirrhosis of liver with ascites, unspecified hepatic cirrhosis type (HCC)  -     riFAXIMin (XIFAXAN) 550 MG tablet; Take 1 tablet by mouth Every 12 (Twelve) Hours. Indications: Impaired Brain Function due to Liver Disease  Dispense: 180 tablet; Refill: 3  -     lactulose (CHRONULAC) 10 GM/15ML solution; Take 30 mL by mouth 2 (Two) Times a Day.  Dispense: 1892 mL; Refill: 3    4. Nausea  Comments:  zofran refilled  Orders:  -     ondansetron ODT (ZOFRAN-ODT) 4 MG disintegrating tablet; Place 1 tablet under the tongue 4 (Four) Times a Day As Needed for Nausea or Vomiting.   Dispense: 60 tablet; Refill: 0    5. Chronic pain syndrome  Comments:  will refill immediate relase morphine until pt's appoint with pain management on 12/20/21. pt understands pain managemnt should handle his chornic pain therapy.  Orders:  -     Morphine (MSIR) 15 MG tablet; Take 1 tablet by mouth Every 8 (Eight) Hours As Needed for Severe Pain .  Dispense: 21 tablet; Refill: 0    6. Hypertension, unspecified type  Comments:  meds and diuretics adjusted during inpatient stay. BP controlled today. cont monitoring.   Orders:  -     propranolol (INDERAL) 20 MG tablet; Take 3 tablets by mouth 2 (Two) Times a Day for 30 days.  Dispense: 180 tablet; Refill: 1    7. Multiple episodes of deep venous thrombosis (HCC)  Comments:  refer to hematology. check INR today. cont coumadin.   Orders:  -     Ambulatory Referral to Hematology    8. Pneumonia of right lower lobe due to infectious organism  -     cefpodoxime (VANTIN) 100 MG tablet; Take 1 tablet by mouth 2 (Two) Times a Day for 2 days.  Dispense: 4 tablet; Refill: 0    9. Urinary tract infection without hematuria, site unspecified  -     cefpodoxime (VANTIN) 100 MG tablet; Take 1 tablet by mouth 2 (Two) Times a Day for 2 days.  Dispense: 4 tablet; Refill: 0    10. Hypomagnesemia  -     magnesium oxide (MAG-OX) 400 MG tablet; Take 1 tablet by mouth Daily.  Dispense: 90 tablet; Refill: 1        Medications Discontinued During This Encounter   Medication Reason   • Glucose Blood (Blood Glucose Test) strip *Error   • Blood Glucose Monitoring Suppl (FreeStyle Lite) device *Error   • Lancets (freestyle) lancets *Error   • oxyCODONE-acetaminophen (PERCOCET)  MG per tablet Alternate therapy   • omeprazole (priLOSEC) 20 MG capsule *Error   • propranolol (INDERAL) 20 MG tablet    • lactulose (CHRONULAC) 10 GM/15ML solution Reorder   • ondansetron ODT (ZOFRAN-ODT) 4 MG disintegrating tablet Reorder   • Morphine (MSIR) 15 MG tablet Reorder   • Insulin Pen Needle (Pen Needles)  32G X 4 MM misc Reorder   • riFAXIMin (XIFAXAN) 550 MG tablet Reorder   • cefpodoxime (VANTIN) 100 MG tablet Reorder       Return in about 4 weeks (around 1/11/2022) for Recheck.

## 2021-12-15 ENCOUNTER — TELEPHONE (OUTPATIENT)
Dept: INTERNAL MEDICINE | Facility: CLINIC | Age: 56
End: 2021-12-15

## 2021-12-15 ENCOUNTER — APPOINTMENT (OUTPATIENT)
Dept: NUTRITION | Facility: HOSPITAL | Age: 56
End: 2021-12-15

## 2021-12-15 NOTE — TELEPHONE ENCOUNTER
PA STARTED FOR MEDICATION    Nic Nicolas (Liang: [id number])  Rx #: [id number]    Xifaxan 550MG tablets     Form  [employer] [place] Managed Medicaid Pharmacy Prior Authorization Form    Plan Contact  [phone] phone  [phone] fax

## 2021-12-15 NOTE — TELEPHONE ENCOUNTER
PA STARTED FOR MEDICATION    Nic Nicolas (Liang: [id number]) - [phone]    Need help? Call us at [phone]    Status  Sent to Plan today    Next Steps  The plan will fax you a determination, typically within 1 to 5 business days.    Drug  Cefpodoxime Proxetil 100MG tablets    Form  [employer] [place] Managed Medicaid Pharmacy Prior Authorization Form    [facility] Medication Request Form for [place] Medicaid Members.  [phone] phone  [phone] fax    Original Claim Info  75 TRANS FEE = 0.00PA REQUIRED: CALL [phone] FOR ASSISTANCE

## 2021-12-16 ENCOUNTER — TELEPHONE (OUTPATIENT)
Dept: INTERNAL MEDICINE | Facility: CLINIC | Age: 56
End: 2021-12-16

## 2021-12-16 ENCOUNTER — APPOINTMENT (OUTPATIENT)
Dept: DIABETES SERVICES | Facility: HOSPITAL | Age: 56
End: 2021-12-16

## 2021-12-16 NOTE — TELEPHONE ENCOUNTER
Caller: Nic Nicolas    Relationship: Self    Best call back number: [phone]    What was the call regarding: PATIENT CALLED STATING THE PHARMACY SAID THEY NEVER GOT THE PATIENTS cefpodoxime (VANTIN) 100 MG tablet AND HE MAY NEED A PA, PLEASE RESEND TO  PHARMACY AND CALL PATIENT WHEN IT IS SENT.    Do you require a callback: YES

## 2021-12-17 NOTE — TELEPHONE ENCOUNTER
PHARMACY VERIFIED     PHARMACY STATES MEDICATION REQUIRES A PA  cefpodoxime (VANTIN) 100 MG tablet (2021)    PA WAS APPROVED     PT(PATIENT) MAY  MEDICATION AT ANY TIME     PHARMACY WILL CONTACT PT(PATIENT) TO NOTIFY OF MEDICATION STATUS

## 2021-12-20 ENCOUNTER — READMISSION MANAGEMENT (OUTPATIENT)
Dept: CALL CENTER | Facility: HOSPITAL | Age: 56
End: 2021-12-20

## 2021-12-20 NOTE — OUTREACH NOTE
Medical Week 2 Survey      Responses   [facility] patient discharged from? [facility]   Does the patient have one of the following disease processes/diagnoses(primary or secondary)? Other   Week 2 attempt successful? No   Unsuccessful attempts Attempt 1          Joana Chatman RN

## 2021-12-21 ENCOUNTER — READMISSION MANAGEMENT (OUTPATIENT)
Dept: CALL CENTER | Facility: HOSPITAL | Age: 56
End: 2021-12-21

## 2021-12-21 NOTE — OUTREACH NOTE
Medical Week 2 Survey      Responses   [facility] patient discharged from? [facility]   Does the patient have one of the following disease processes/diagnoses(primary or secondary)? Other   Week 2 attempt successful? Yes   Call start time 1425   Discharge diagnosis Hepatic encephalopathy    Call end time 1428   Meds reviewed with patient/caregiver? Yes   Is the patient taking all medications as directed (includes completed medication regime)? Yes   Comments regarding appointments Rescheduled appt with DM management   Does the patient have a primary care provider?  Yes   Comments regarding PCP PCP appt completed   Has the patient kept scheduled appointments due by today? Yes   Comments 12/27/21 Scheduled paracentisis   Psychosocial issues? No   Did the patient receive a copy of their discharge instructions? Yes   What is the patient's perception of their health status since discharge? Improving   Is the patient/caregiver able to teach back signs and symptoms related to disease process for when to call PCP? Yes   Is the patient/caregiver able to teach back signs and symptoms related to disease process for when to call 911? Yes   Is the patient/caregiver able to teach back the hierarchy of who to call/visit for symptoms/problems? PCP, Specialist, Home health nurse, Urgent Care, ED, 911 Yes   Week 2 Call Completed? Yes   Wrap up additional comments Pt reports that he is doing very well. No needs at this time.           Tiffanie Wiseman RN

## 2021-12-23 ENCOUNTER — APPOINTMENT (OUTPATIENT)
Dept: DIABETES SERVICES | Facility: HOSPITAL | Age: 56
End: 2021-12-23

## 2021-12-27 ENCOUNTER — HOSPITAL ENCOUNTER (OUTPATIENT)
Dept: INTERVENTIONAL RADIOLOGY/VASCULAR | Facility: HOSPITAL | Age: 56
Discharge: HOME | End: 2021-12-27
Admitting: NURSE PRACTITIONER

## 2021-12-27 DIAGNOSIS — G89.4 CHRONIC PAIN SYNDROME: ICD-10-CM

## 2021-12-27 DIAGNOSIS — R18.8 CIRRHOSIS OF LIVER WITH ASCITES, UNSPECIFIED HEPATIC CIRRHOSIS TYPE (HCC): ICD-10-CM

## 2021-12-27 DIAGNOSIS — K74.60 CIRRHOSIS OF LIVER WITH ASCITES, UNSPECIFIED HEPATIC CIRRHOSIS TYPE (HCC): ICD-10-CM

## 2021-12-27 PROCEDURE — C1729 CATH, DRAINAGE: HCPCS

## 2021-12-27 PROCEDURE — 76942 ECHO GUIDE FOR BIOPSY: CPT

## 2021-12-27 RX ORDER — LIDOCAINE HYDROCHLORIDE 20 MG/ML
INJECTION, SOLUTION INFILTRATION; PERINEURAL
Status: COMPLETED
Start: 2021-12-27 | End: 2021-12-27

## 2021-12-27 RX ADMIN — LIDOCAINE HYDROCHLORIDE 10 ML: 20 INJECTION, SOLUTION INFILTRATION; PERINEURAL at 10:20

## 2021-12-27 NOTE — TELEPHONE ENCOUNTER
Caller: Nic Nicolas [place]    Relationship: Self    Best call back number: [phone]     Requested Prescriptions:   Requested Prescriptions     Pending Prescriptions Disp Refills   • Morphine (MSIR) 15 MG tablet 21 tablet 0     Sig: Take 1 tablet by mouth Every 8 (Eight) Hours As Needed for Severe Pain .      riFAXIMin (XIFAXAN) 550 MG tablet    [facility] where [facility] should be sent[facility] [address] [facility] [address] [phone] [phone]      Additional details provided by patient: PATIENT IS OUT OF HIS MORPHINE AND NEEDS A REFILL TO LAST TILL HIS NEXT APPOINTMENT ON 1/10/22    Does the patient have less than a 3 day supply:  [x] Yes  [] No    Kristin Mantilla Rep   12/27/21 14:29 EST

## 2021-12-27 NOTE — TELEPHONE ENCOUNTER
Patient was supposed to start seeing pain management on 12/20/21 and supposed to take over the opioid pain medication. What happened here?

## 2021-12-28 ENCOUNTER — TELEPHONE (OUTPATIENT)
Dept: INTERNAL MEDICINE | Facility: CLINIC | Age: 56
End: 2021-12-28

## 2021-12-28 RX ORDER — MORPHINE SULFATE 15 MG/1
15 TABLET ORAL EVERY 8 HOURS PRN
Qty: 42 TABLET | Refills: 0 | Status: SHIPPED | OUTPATIENT
Start: 2021-12-28 | End: 2022-02-10

## 2021-12-28 NOTE — TELEPHONE ENCOUNTER
Refilled to get him to his appointment. This will be his last fill from us. Need to follow-up with pain mgmt moving forward.

## 2021-12-28 NOTE — TELEPHONE ENCOUNTER
ATTEMPTED TO CONTACT PT PER PROVIDER'S INSTRUCTIONS     NO ANSWER     LEFT VOICEMAIL WITH INSTRUCTIONS TO RETURN CALL TO OFFICE AT [phone]    OK FOR HUB TO ADVISE/READ   Jonh Franco Jr., MD 1 minute ago (3:18 PM)        Refilled to get him to his appointment. Need to follow-up with pain mgmt moving forward.

## 2021-12-28 NOTE — TELEPHONE ENCOUNTER
Caller: Nic Nicolas    Relationship: Self    Best call back number:[phone]    What is the best time to reach you:ANY     Who are you requesting to speak with CLINICAL     What was the call regarding: PATIENT IS STILL IN A LOT OF PAIN IN HIS LOWER BACK AND LEFT LEG AND HASN'T RECEIVED A CALL BACK YET.PATIENT IS ASKING FOR A REFILL ON PAIN MEDICATION UNTIL HIS APPT  1-10-22.    Morphine (MSIR) 15 MG tablet    PATIENT STATED THE DOCTOR OFFICE HAD TO RESCHEDULE HIM ON 1-10-22    [facility]  [place], [place] - [address] [facility] AT [facility] [address] - [phone]  - [phone] FX     Do you require a callback: YES

## 2021-12-28 NOTE — TELEPHONE ENCOUNTER
Caller: Nic Nicolas    Relationship: Self    Best call back number:[phone]    What is the best time to reach you: ANYTIME    Who are you requesting to speak with (clinical staff, provider,  specifi staff member): CLINICAL    What was the call regarding: THE PATIENT WOULD LIKE A CALL BACK TO DISCUSS IF PCP WILL REFILL HIS PAIN MEDICATION. THE PATIENT STATED HE WAS UNABLE TO SLEEP LAST NIGHT AND NEEDS THIS REFILL ASAP    Do you require a callback: YES

## 2021-12-29 ENCOUNTER — APPOINTMENT (OUTPATIENT)
Dept: DIABETES SERVICES | Facility: HOSPITAL | Age: 56
End: 2021-12-29

## 2021-12-29 ENCOUNTER — TELEPHONE (OUTPATIENT)
Dept: INTERNAL MEDICINE | Facility: CLINIC | Age: 56
End: 2021-12-29

## 2021-12-29 ENCOUNTER — EDUCATION (OUTPATIENT)
Dept: DIABETES SERVICES | Facility: HOSPITAL | Age: 56
End: 2021-12-29

## 2021-12-29 PROCEDURE — G0108 DIAB MANAGE TRN  PER INDIV: HCPCS

## 2021-12-29 NOTE — TELEPHONE ENCOUNTER
Caller: Nic Nicolas    Relationship: Self    Best call back number: [phone]     What was the call regarding: PT CALLED ABOUT A LETTER HE RECEIVED ABOUT OVER DUE LABS.  HE STATED HE DOES NOT KNOW WHAT THIS IS REGARDING AND WOULD LIKE SOMEONE TO CALL HIM BACK TO DISCUSS.  ALSO STATED HE WOULD LIKE TO KNOW IF HE WAS SUPPOSED TO CONTINUE TO TAKE SPIRONLACTONE  PLEASE ADVISE, THANK YOU.      Do you require a callback:YES

## 2021-12-29 NOTE — TELEPHONE ENCOUNTER
ATTEMPTED TO CONTACT PT PER PROVIDER'S INSTRUCTIONS     NO ANSWER     LEFT VOICEMAIL WITH INSTRUCTIONS TO RETURN CALL TO OFFICE AT [phone]    OK FOR HUB TO ADVISE/READ   PT(PATIENT) HAS COMPLETED ALL ORDERED LABS ON 12/11/2021    PT(PATIENT) MAY DISREGARD OVERDUE LAB ORDERS LETTER    THANK YOU

## 2021-12-30 ENCOUNTER — READMISSION MANAGEMENT (OUTPATIENT)
Dept: CALL CENTER | Facility: HOSPITAL | Age: 56
End: 2021-12-30

## 2021-12-30 NOTE — OUTREACH NOTE
Medical Week 3 Survey      Responses   [facility] patient discharged from? [facility]   Does the patient have one of the following disease processes/diagnoses(primary or secondary)? Other   Week 3 attempt successful? Yes   Call start time 1557   Call end time 1605   Discharge diagnosis Hepatic encephalopathy    Meds reviewed with patient/caregiver? Yes   Is the patient having any side effects they believe may be caused by any medication additions or changes? No   Is the patient taking all medications as directed (includes completed medication regime)? Yes   Does the patient have a primary care provider?  Yes   Has the patient kept scheduled appointments due by today? Yes   Has home health visited the patient within 72 hours of discharge? N/A   Has all DME been delivered? No   Psychosocial issues? No   What is the patient's perception of their health status since discharge? Improving   Is the patient/caregiver able to teach back signs and symptoms related to disease process for when to call PCP? Yes   Is the patient/caregiver able to teach back signs and symptoms related to disease process for when to call 911? Yes   Is the patient/caregiver able to teach back the hierarchy of who to call/visit for symptoms/problems? PCP, Specialist, Home health nurse, Urgent Care, ED, 911 Yes   If the patient is a current smoker, are they able to teach back resources for cessation? Not a smoker   Week 3 Call Completed? Yes          JOHNATHAN FLORES RN

## 2022-01-05 ENCOUNTER — APPOINTMENT (OUTPATIENT)
Dept: NUTRITION | Facility: HOSPITAL | Age: 57
End: 2022-01-05

## 2022-01-10 ENCOUNTER — READMISSION MANAGEMENT (OUTPATIENT)
Dept: CALL CENTER | Facility: HOSPITAL | Age: 57
End: 2022-01-10

## 2022-01-10 NOTE — OUTREACH NOTE
Medical Week 4 Survey      Responses   [facility] patient discharged from? [facility]   Does the patient have one of the following disease processes/diagnoses(primary or secondary)? Other   Week 4 attempt successful? Yes   Call start time 1604   Call end time 1607   Discharge diagnosis Hepatic encephalopathy    Meds reviewed with patient/caregiver? Yes   Is the patient taking all medications as directed (includes completed medication regime)? Yes   Has the patient kept scheduled appointments due by today? Yes   Is the patient still receiving Home Health Services? N/A   What is the patient's perception of their health status since discharge? Improving   Week 4 Call Completed? Yes   Would the patient like one additional call? No   Graduated Yes   Did the patient feel the follow up calls were helpful during their recovery period? Yes          Asia Apodaca RN

## 2022-01-12 ENCOUNTER — APPOINTMENT (OUTPATIENT)
Dept: NUTRITION | Facility: HOSPITAL | Age: 57
End: 2022-01-12

## 2022-01-19 ENCOUNTER — HOSPITAL ENCOUNTER (INPATIENT)
Facility: HOSPITAL | Age: 57
LOS: 11 days | Discharge: HOME HEALTH | End: 2022-01-30
Attending: INTERNAL MEDICINE

## 2022-01-19 ENCOUNTER — APPOINTMENT (OUTPATIENT)
Dept: GENERAL RADIOLOGY | Facility: HOSPITAL | Age: 57
End: 2022-01-19

## 2022-01-19 ENCOUNTER — APPOINTMENT (OUTPATIENT)
Dept: CT IMAGING | Facility: HOSPITAL | Age: 57
End: 2022-01-19

## 2022-01-19 ENCOUNTER — HOSPITAL ENCOUNTER (EMERGENCY)
Facility: HOSPITAL | Age: 57
Discharge: ANOTHER HOSPITAL | End: 2022-01-19
Attending: EMERGENCY MEDICINE

## 2022-01-19 ENCOUNTER — HOSPITAL ENCOUNTER (OUTPATIENT)
Dept: NUTRITION | Facility: HOSPITAL | Age: 57
Setting detail: RECURRING SERIES
Discharge: HOME | End: 2022-01-19

## 2022-01-19 VITALS
HEART RATE: 87 BPM | BODY MASS INDEX: 37.89 KG/M2 | WEIGHT: 250 LBS | OXYGEN SATURATION: 92 % | TEMPERATURE: 97.9 F | DIASTOLIC BLOOD PRESSURE: 91 MMHG | RESPIRATION RATE: 12 BRPM | HEIGHT: 68 IN | SYSTOLIC BLOOD PRESSURE: 138 MMHG

## 2022-01-19 DIAGNOSIS — D61.818 PANCYTOPENIA: ICD-10-CM

## 2022-01-19 DIAGNOSIS — K74.60 CIRRHOSIS OF LIVER WITH ASCITES, UNSPECIFIED HEPATIC CIRRHOSIS TYPE: Primary | ICD-10-CM

## 2022-01-19 DIAGNOSIS — R18.8 CIRRHOSIS OF LIVER WITH ASCITES, UNSPECIFIED HEPATIC CIRRHOSIS TYPE: Primary | ICD-10-CM

## 2022-01-19 DIAGNOSIS — K76.82 HEPATIC ENCEPHALOPATHY: ICD-10-CM

## 2022-01-19 DIAGNOSIS — I63.9 CEREBROVASCULAR ACCIDENT (CVA), UNSPECIFIED MECHANISM: Primary | ICD-10-CM

## 2022-01-19 LAB
ABO GROUP BLD: NORMAL
ALBUMIN SERPL-MCNC: 3.2 G/DL (ref 3.5–5.2)
ALBUMIN/GLOB SERPL: 0.8 G/DL
ALP SERPL-CCNC: 128 U/L (ref 39–117)
ALT SERPL W P-5'-P-CCNC: 31 U/L (ref 1–41)
ANION GAP SERPL CALCULATED.3IONS-SCNC: 10.2 MMOL/L (ref 5–15)
APTT PPP: 29 SECONDS (ref 22.2–34.2)
AST SERPL-CCNC: 85 U/L (ref 1–40)
BASOPHILS # BLD AUTO: 0.01 10*3/MM3 (ref 0–0.2)
BASOPHILS NFR BLD AUTO: 0.5 % (ref 0–1.5)
BILIRUB SERPL-MCNC: 1.2 MG/DL (ref 0–1.2)
BLD GP AB SCN SERPL QL: NEGATIVE
BUN SERPL-MCNC: 13 MG/DL (ref 6–20)
BUN/CREAT SERPL: 13.5 (ref 7–25)
CALCIUM SPEC-SCNC: 8.7 MG/DL (ref 8.6–10.5)
CHLORIDE SERPL-SCNC: 104 MMOL/L (ref 98–107)
CO2 SERPL-SCNC: 21.8 MMOL/L (ref 22–29)
CREAT BLDA-MCNC: 0.8 MG/DL
CREAT SERPL-MCNC: 0.96 MG/DL (ref 0.76–1.27)
DEPRECATED RDW RBC AUTO: 50.8 FL (ref 37–54)
EOSINOPHIL # BLD AUTO: 0.02 10*3/MM3 (ref 0–0.4)
EOSINOPHIL NFR BLD AUTO: 1 % (ref 0.3–6.2)
ERYTHROCYTE [DISTWIDTH] IN BLOOD BY AUTOMATED COUNT: 15.2 % (ref 12.3–15.4)
GFR SERPL CREATININE-BSD FRML MDRD: 81 ML/MIN/1.73
GLOBULIN UR ELPH-MCNC: 3.8 GM/DL
GLUCOSE BLDC GLUCOMTR-MCNC: 67 MG/DL (ref 70–130)
GLUCOSE BLDC GLUCOMTR-MCNC: 81 MG/DL (ref 70–130)
GLUCOSE BLDC GLUCOMTR-MCNC: 88 MG/DL (ref 70–99)
GLUCOSE SERPL-MCNC: 95 MG/DL (ref 65–99)
HCT VFR BLD AUTO: 32.5 % (ref 37.5–51)
HGB BLD-MCNC: 11 G/DL (ref 13–17.7)
HOLD SPECIMEN: NORMAL
IMM GRANULOCYTES # BLD AUTO: 0.01 10*3/MM3 (ref 0–0.05)
IMM GRANULOCYTES NFR BLD AUTO: 0.5 % (ref 0–0.5)
INR PPP: 1.5 (ref 2–3)
LYMPHOCYTES # BLD AUTO: 0.83 10*3/MM3 (ref 0.7–3.1)
LYMPHOCYTES NFR BLD AUTO: 39.7 % (ref 19.6–45.3)
MCH RBC QN AUTO: 30.4 PG (ref 26.6–33)
MCHC RBC AUTO-ENTMCNC: 33.8 G/DL (ref 31.5–35.7)
MCV RBC AUTO: 89.8 FL (ref 79–97)
MONOCYTES # BLD AUTO: 0.37 10*3/MM3 (ref 0.1–0.9)
MONOCYTES NFR BLD AUTO: 17.7 % (ref 5–12)
NEUTROPHILS NFR BLD AUTO: 0.85 10*3/MM3 (ref 1.7–7)
NEUTROPHILS NFR BLD AUTO: 40.6 % (ref 42.7–76)
NRBC BLD AUTO-RTO: 0 /100 WBC (ref 0–0.2)
PLATELET # BLD AUTO: 80 10*3/MM3 (ref 140–450)
PMV BLD AUTO: 11.4 FL (ref 6–12)
POTASSIUM SERPL-SCNC: 3.9 MMOL/L (ref 3.5–5.2)
PROT SERPL-MCNC: 7 G/DL (ref 6–8.5)
PROTHROMBIN TIME: 14.9 SECONDS (ref 9.4–12)
QT INTERVAL: 430 MS
RBC # BLD AUTO: 3.62 10*6/MM3 (ref 4.14–5.8)
RH BLD: POSITIVE
SODIUM SERPL-SCNC: 136 MMOL/L (ref 136–145)
T&S EXPIRATION DATE: NORMAL
TROPONIN T SERPL-MCNC: <0.01 NG/ML (ref 0–0.03)
WBC NRBC COR # BLD: 2.09 10*3/MM3 (ref 3.4–10.8)
WHOLE BLOOD HOLD SPECIMEN: NORMAL
WHOLE BLOOD HOLD SPECIMEN: NORMAL

## 2022-01-19 PROCEDURE — 82962 GLUCOSE BLOOD TEST: CPT

## 2022-01-19 PROCEDURE — 82565 ASSAY OF CREATININE: CPT

## 2022-01-19 PROCEDURE — 93005 ELECTROCARDIOGRAM TRACING: CPT | Performed by: EMERGENCY MEDICINE

## 2022-01-19 PROCEDURE — 70450 CT HEAD/BRAIN W/O DYE: CPT

## 2022-01-19 PROCEDURE — 25010000002 MORPHINE PER 10 MG: Performed by: INTERNAL MEDICINE

## 2022-01-19 PROCEDURE — 99284 EMERGENCY DEPT VISIT MOD MDM: CPT

## 2022-01-19 PROCEDURE — 70498 CT ANGIOGRAPHY NECK: CPT

## 2022-01-19 PROCEDURE — 0 IOPAMIDOL PER 1 ML: Performed by: EMERGENCY MEDICINE

## 2022-01-19 PROCEDURE — 85730 THROMBOPLASTIN TIME PARTIAL: CPT | Performed by: EMERGENCY MEDICINE

## 2022-01-19 PROCEDURE — 25010000002 ALTEPLASE PER 1 MG: Performed by: EMERGENCY MEDICINE

## 2022-01-19 PROCEDURE — 25010000002 ALTEPLASE PER 1 MG

## 2022-01-19 PROCEDURE — 84484 ASSAY OF TROPONIN QUANT: CPT | Performed by: EMERGENCY MEDICINE

## 2022-01-19 PROCEDURE — 86850 RBC ANTIBODY SCREEN: CPT | Performed by: EMERGENCY MEDICINE

## 2022-01-19 PROCEDURE — 0042T HC CT CEREBRAL PERFUSION W/WO CONTRAST: CPT

## 2022-01-19 PROCEDURE — 70496 CT ANGIOGRAPHY HEAD: CPT

## 2022-01-19 PROCEDURE — 85025 COMPLETE CBC W/AUTO DIFF WBC: CPT | Performed by: EMERGENCY MEDICINE

## 2022-01-19 PROCEDURE — 80053 COMPREHEN METABOLIC PANEL: CPT | Performed by: EMERGENCY MEDICINE

## 2022-01-19 PROCEDURE — 71045 X-RAY EXAM CHEST 1 VIEW: CPT

## 2022-01-19 PROCEDURE — 86900 BLOOD TYPING SEROLOGIC ABO: CPT | Performed by: EMERGENCY MEDICINE

## 2022-01-19 PROCEDURE — 85610 PROTHROMBIN TIME: CPT | Performed by: EMERGENCY MEDICINE

## 2022-01-19 PROCEDURE — 86901 BLOOD TYPING SEROLOGIC RH(D): CPT | Performed by: EMERGENCY MEDICINE

## 2022-01-19 PROCEDURE — 96360 HYDRATION IV INFUSION INIT: CPT

## 2022-01-19 PROCEDURE — 25010000002 MORPHINE PER 10 MG

## 2022-01-19 RX ORDER — SODIUM CHLORIDE 9 MG/ML
100 INJECTION, SOLUTION INTRAVENOUS ONCE
Status: COMPLETED | OUTPATIENT
Start: 2022-01-19 | End: 2022-01-19

## 2022-01-19 RX ORDER — POTASSIUM CHLORIDE 7.45 MG/ML
10 INJECTION INTRAVENOUS
Status: DISCONTINUED | OUTPATIENT
Start: 2022-01-19 | End: 2022-01-30 | Stop reason: HOSPADM

## 2022-01-19 RX ORDER — PANTOPRAZOLE SODIUM 40 MG/1
40 TABLET, DELAYED RELEASE ORAL EVERY MORNING
Status: DISCONTINUED | OUTPATIENT
Start: 2022-01-20 | End: 2022-01-30 | Stop reason: HOSPADM

## 2022-01-19 RX ORDER — ONDANSETRON 4 MG/1
4 TABLET, ORALLY DISINTEGRATING ORAL 4 TIMES DAILY PRN
Status: DISCONTINUED | OUTPATIENT
Start: 2022-01-19 | End: 2022-01-30 | Stop reason: HOSPADM

## 2022-01-19 RX ORDER — SODIUM CHLORIDE 0.9 % (FLUSH) 0.9 %
10 SYRINGE (ML) INJECTION EVERY 12 HOURS SCHEDULED
Status: DISCONTINUED | OUTPATIENT
Start: 2022-01-19 | End: 2022-01-23

## 2022-01-19 RX ORDER — SODIUM CHLORIDE 9 MG/ML
0.81 INJECTION, SOLUTION INTRAVENOUS ONCE
Status: DISCONTINUED | OUTPATIENT
Start: 2022-01-19 | End: 2022-01-19

## 2022-01-19 RX ORDER — ASPIRIN 325 MG
325 TABLET ORAL DAILY
Status: DISCONTINUED | OUTPATIENT
Start: 2022-01-20 | End: 2022-01-19

## 2022-01-19 RX ORDER — ASPIRIN 300 MG/1
300 SUPPOSITORY RECTAL DAILY
Status: DISCONTINUED | OUTPATIENT
Start: 2022-01-20 | End: 2022-01-19

## 2022-01-19 RX ORDER — NICOTINE POLACRILEX 4 MG
15 LOZENGE BUCCAL
Status: DISCONTINUED | OUTPATIENT
Start: 2022-01-19 | End: 2022-01-30 | Stop reason: HOSPADM

## 2022-01-19 RX ORDER — SODIUM CHLORIDE 0.9 % (FLUSH) 0.9 %
10 SYRINGE (ML) INJECTION AS NEEDED
Status: DISCONTINUED | OUTPATIENT
Start: 2022-01-19 | End: 2022-01-19 | Stop reason: HOSPADM

## 2022-01-19 RX ORDER — HYDROCODONE BITARTRATE AND ACETAMINOPHEN 5; 325 MG/1; MG/1
1 TABLET ORAL EVERY 6 HOURS PRN
Status: DISCONTINUED | OUTPATIENT
Start: 2022-01-19 | End: 2022-01-23

## 2022-01-19 RX ORDER — POTASSIUM CHLORIDE 750 MG/1
40 TABLET, FILM COATED, EXTENDED RELEASE ORAL AS NEEDED
Status: DISCONTINUED | OUTPATIENT
Start: 2022-01-19 | End: 2022-01-30 | Stop reason: HOSPADM

## 2022-01-19 RX ORDER — BUSPIRONE HYDROCHLORIDE 5 MG/1
5 TABLET ORAL 3 TIMES DAILY
Status: DISCONTINUED | OUTPATIENT
Start: 2022-01-19 | End: 2022-01-30 | Stop reason: HOSPADM

## 2022-01-19 RX ORDER — CALCIUM GLUCONATE 20 MG/ML
1 INJECTION, SOLUTION INTRAVENOUS AS NEEDED
Status: DISCONTINUED | OUTPATIENT
Start: 2022-01-19 | End: 2022-01-30 | Stop reason: HOSPADM

## 2022-01-19 RX ORDER — ALBUTEROL SULFATE 90 UG/1
2 AEROSOL, METERED RESPIRATORY (INHALATION) EVERY 4 HOURS PRN
Status: DISCONTINUED | OUTPATIENT
Start: 2022-01-19 | End: 2022-01-30 | Stop reason: HOSPADM

## 2022-01-19 RX ORDER — SODIUM CHLORIDE 0.9 % (FLUSH) 0.9 %
10 SYRINGE (ML) INJECTION AS NEEDED
Status: DISCONTINUED | OUTPATIENT
Start: 2022-01-19 | End: 2022-01-28

## 2022-01-19 RX ORDER — POTASSIUM CHLORIDE 1.5 G/1.77G
40 POWDER, FOR SOLUTION ORAL AS NEEDED
Status: DISCONTINUED | OUTPATIENT
Start: 2022-01-19 | End: 2022-01-30 | Stop reason: HOSPADM

## 2022-01-19 RX ORDER — MAGNESIUM SULFATE HEPTAHYDRATE 40 MG/ML
2 INJECTION, SOLUTION INTRAVENOUS AS NEEDED
Status: DISCONTINUED | OUTPATIENT
Start: 2022-01-19 | End: 2022-01-30 | Stop reason: HOSPADM

## 2022-01-19 RX ORDER — SODIUM CHLORIDE 9 MG/ML
75 INJECTION, SOLUTION INTRAVENOUS CONTINUOUS
Status: DISCONTINUED | OUTPATIENT
Start: 2022-01-19 | End: 2022-01-23

## 2022-01-19 RX ORDER — BACLOFEN 10 MG/1
10 TABLET ORAL 3 TIMES DAILY
Status: DISCONTINUED | OUTPATIENT
Start: 2022-01-19 | End: 2022-01-21

## 2022-01-19 RX ORDER — MORPHINE SULFATE 15 MG/1
15 TABLET ORAL EVERY 8 HOURS PRN
Status: DISCONTINUED | OUTPATIENT
Start: 2022-01-19 | End: 2022-01-23

## 2022-01-19 RX ORDER — MORPHINE SULFATE 2 MG/ML
2 INJECTION, SOLUTION INTRAMUSCULAR; INTRAVENOUS EVERY 4 HOURS PRN
Status: DISCONTINUED | OUTPATIENT
Start: 2022-01-19 | End: 2022-01-21

## 2022-01-19 RX ORDER — MAGNESIUM SULFATE HEPTAHYDRATE 40 MG/ML
4 INJECTION, SOLUTION INTRAVENOUS AS NEEDED
Status: DISCONTINUED | OUTPATIENT
Start: 2022-01-19 | End: 2022-01-30 | Stop reason: HOSPADM

## 2022-01-19 RX ORDER — ACETAMINOPHEN 325 MG/1
650 TABLET ORAL EVERY 6 HOURS PRN
Status: DISCONTINUED | OUTPATIENT
Start: 2022-01-19 | End: 2022-01-30 | Stop reason: HOSPADM

## 2022-01-19 RX ORDER — ATORVASTATIN CALCIUM 80 MG/1
80 TABLET, FILM COATED ORAL NIGHTLY
Status: DISCONTINUED | OUTPATIENT
Start: 2022-01-19 | End: 2022-01-21

## 2022-01-19 RX ORDER — DEXTROSE MONOHYDRATE 25 G/50ML
25 INJECTION, SOLUTION INTRAVENOUS
Status: DISCONTINUED | OUTPATIENT
Start: 2022-01-19 | End: 2022-01-30 | Stop reason: HOSPADM

## 2022-01-19 RX ORDER — LACTULOSE 10 G/15ML
20 SOLUTION ORAL 2 TIMES DAILY
Status: DISCONTINUED | OUTPATIENT
Start: 2022-01-19 | End: 2022-01-30 | Stop reason: HOSPADM

## 2022-01-19 RX ADMIN — IOPAMIDOL 100 ML: 755 INJECTION, SOLUTION INTRAVENOUS at 11:45

## 2022-01-19 RX ADMIN — MORPHINE SULFATE 4 MG: 4 INJECTION INTRAVENOUS at 13:28

## 2022-01-19 RX ADMIN — ALTEPLASE 9 MG: KIT at 11:34

## 2022-01-19 RX ADMIN — SODIUM CHLORIDE, PRESERVATIVE FREE 10 ML: 5 INJECTION INTRAVENOUS at 23:46

## 2022-01-19 RX ADMIN — ALTEPLASE 81 MG: KIT at 11:45

## 2022-01-19 RX ADMIN — IOPAMIDOL 100 ML: 755 INJECTION, SOLUTION INTRAVENOUS at 11:38

## 2022-01-19 RX ADMIN — MORPHINE SULFATE 4 MG: 4 INJECTION INTRAVENOUS at 18:25

## 2022-01-19 RX ADMIN — SODIUM CHLORIDE 100 ML: 9 INJECTION, SOLUTION INTRAVENOUS at 12:45

## 2022-01-19 RX ADMIN — MORPHINE SULFATE 2 MG: 2 INJECTION, SOLUTION INTRAMUSCULAR; INTRAVENOUS at 23:52

## 2022-01-19 RX ADMIN — SODIUM CHLORIDE 75 ML/HR: 9 INJECTION, SOLUTION INTRAVENOUS at 22:54

## 2022-01-19 RX ADMIN — ATORVASTATIN CALCIUM 80 MG: 80 TABLET, FILM COATED ORAL at 22:17

## 2022-01-19 RX ADMIN — SODIUM CHLORIDE, PRESERVATIVE FREE 10 ML: 5 INJECTION INTRAVENOUS at 22:17

## 2022-01-19 NOTE — ED PROVIDER NOTES
Time: 11:18 AM EST  Arrived by: ambulance  Chief Complaint: stroke  History provided by: pt and EMS  History is limited by: N/A     History of Present Illness:    Nic Nicolas is a 56 y.o. male who presents to the emergency department today with complaints of sudden onset numbness. Pt was at a doctors appointment when he ended up getting an acute onset of left upper and lower extremity weakness/numbness. He complains of associated left sided facial droop as well as speech difficulty. Upon arrival to ED pt has a blood sugar in the 80s. He denies visual changes, chest pain, SOB, cough, headache or any other pertinent sx.       History provided by:  Patient and EMS personnel   used: No    Stroke  Presenting symptoms: sensory loss and weakness    Presenting symptoms: no headaches    Onset quality:  Sudden  Last known well:  30 minutes ago  Timing:  Constant  Progression:  Unchanged  Similar to previous episodes: no    Associated symptoms: no chest pain, no fever, no nausea, no seizures and no vomiting        Patient Care Team  Primary Care Provider: Jonh Franco Jr., MD    Past Medical History:     No Known Allergies  Past Medical History:   Diagnosis Date   • Asthma    • Cirrhosis (HCC)    • Colon polyps    • Diabetes mellitus (HCC)    • Hypertension    • Liver disease    • Reflux esophagitis    • Renal disorder    • Sleep apnea      Past Surgical History:   Procedure Laterality Date   • COLONOSCOPY  2018, 2020   • ENDOSCOPY  2019   • FRACTURE SURGERY     • LEG SURGERY     • PELVIC FRACTURE SURGERY     • UPPER GASTROINTESTINAL ENDOSCOPY       Family History   Problem Relation Age of Onset   • Stomach cancer Sister    • Lung cancer Father    • Colon cancer Neg Hx        Home Medications:  Prior to Admission medications    Medication Sig Start Date End Date Taking? Authorizing Provider   albuterol sulfate  (90 Base) MCG/ACT inhaler Inhale 2 puffs Every 4 (Four) Hours As Needed  for Wheezing. 11/2/21   Britney Echevarria APRN   Alcohol Swabs (Alcohol Prep) pads 1 pad 5 (Five) Times a Day. 12/3/21   Jonh Franco Jr., MD   amLODIPine (NORVASC) 10 MG tablet Take 1 tablet by mouth Daily. 11/2/21   Britney Echevarria APRN   baclofen (LIORESAL) 10 MG tablet Take 10 mg by mouth 3 (Three) Times a Day.    ProviderJoi MD   busPIRone (BUSPAR) 5 MG tablet Take 1 tablet by mouth 3 (Three) Times a Day. 11/2/21   Britney Echevarria APRN   furosemide (LASIX) 40 MG tablet Take 3 tablets by mouth 2 (Two) Times a Day for 15 days. 12/11/21 12/26/21  Kenny Woodard PA   glimepiride (Amaryl) 1 MG tablet Take 1 tablet by mouth Every Morning Before Breakfast for 30 days. 11/2/21 12/5/21  Britney Echevarria APRN   Glucose Blood (Blood Glucose Test) strip Inject 1 strip under the skin into the appropriate area as directed 5 (Five) Times a Day. 12/14/21   Jonh Franco Jr., MD   Insulin Glargine (LANTUS SOLOSTAR) 100 UNIT/ML injection pen Inject 66 Units under the skin into the appropriate area as directed Daily for 30 days. 12/3/21 1/2/22  Jonh Franco Jr., MD   lactulose (CHRONULAC) 10 GM/15ML solution Take 30 mL by mouth 2 (Two) Times a Day. 12/14/21   Jonh Franco Jr., MD   Lancets (freestyle) lancets 1 each by Other route As Needed (glucose check). 12/14/21   Jonh Franco Jr., MD   magnesium oxide (MAG-OX) 400 MG tablet Take 1 tablet by mouth Daily. 12/14/21   Jonh Franco Jr., MD   Morphine (MSIR) 15 MG tablet Take 1 tablet by mouth Every 8 (Eight) Hours As Needed for Severe Pain . 12/28/21   Jonh Franco Jr., MD   omeprazole (priLOSEC) 20 MG capsule Take 20 mg by mouth Daily.    Provider, MD Joi   ondansetron ODT (ZOFRAN-ODT) 4 MG disintegrating tablet Place 1 tablet under the tongue 4 (Four) Times a Day As Needed for Nausea or Vomiting. 12/14/21   Jonh Franco Jr., MD   Potassium 99 MG  "tablet Apply 99 mg to the mouth or throat 2 (Two) Times a Day. 11/2/21   Britney Echevarria APRN   propranolol (INDERAL) 20 MG tablet Take 3 tablets by mouth 2 (Two) Times a Day for 30 days. 12/14/21 1/13/22  Jonh Franco Jr., MD   riFAXIMin (XIFAXAN) 550 MG tablet Take 1 tablet by mouth Every 12 (Twelve) Hours. Indications: Impaired Brain Function due to Liver Disease 12/14/21   Jonh Franco Jr., MD   sertraline (ZOLOFT) 50 MG tablet Take 50 mg by mouth Daily.    Provider, MD Joi   spironolactone (ALDACTONE) 100 MG tablet Take 1 tablet by mouth 2 (Two) Times a Day. 12/11/21   Kenny Woodard PA        Social History:   Social History     Tobacco Use   • Smoking status: Never Smoker   • Smokeless tobacco: Never Used   Vaping Use   • Vaping Use: Never used   Substance Use Topics   • Alcohol use: Never   • Drug use: Never     Recent travel: no     Review of Systems:  Review of Systems   Constitutional: Negative for chills and fever.   HENT: Negative for congestion, rhinorrhea and sore throat.    Eyes: Negative for pain and visual disturbance.   Respiratory: Negative for apnea, cough, chest tightness and shortness of breath.    Cardiovascular: Negative for chest pain and palpitations.   Gastrointestinal: Negative for abdominal pain, diarrhea, nausea and vomiting.   Genitourinary: Negative for difficulty urinating and dysuria.   Musculoskeletal: Negative for joint swelling and myalgias.   Skin: Negative for color change.   Neurological: Positive for facial asymmetry, speech difficulty, weakness and numbness. Negative for seizures and headaches.   Psychiatric/Behavioral: Negative.    All other systems reviewed and are negative.       Physical Exam:  /77 (BP Location: Right arm, Patient Position: Sitting)   Pulse 84   Temp 97.9 °F (36.6 °C) (Oral)   Resp 15   Ht 172.7 cm (68\")   Wt 113 kg (250 lb)   SpO2 97%   BMI 38.01 kg/m²     Physical Exam  Vitals and nursing note " reviewed.   Constitutional:       General: He is not in acute distress.     Appearance: Normal appearance. He is not toxic-appearing.   HENT:      Head: Normocephalic and atraumatic.      Jaw: There is normal jaw occlusion.   Eyes:      General: Lids are normal.      Extraocular Movements: Extraocular movements intact.      Conjunctiva/sclera: Conjunctivae normal.      Pupils: Pupils are equal, round, and reactive to light.   Cardiovascular:      Rate and Rhythm: Normal rate and regular rhythm.      Pulses: Normal pulses.      Heart sounds: Normal heart sounds.   Pulmonary:      Effort: Pulmonary effort is normal. No respiratory distress.      Breath sounds: Normal breath sounds. No wheezing or rhonchi.   Abdominal:      General: Abdomen is flat.      Palpations: Abdomen is soft.      Tenderness: There is no abdominal tenderness. There is no guarding or rebound.   Musculoskeletal:         General: Normal range of motion.      Cervical back: Normal range of motion and neck supple.      Right lower leg: No edema.      Left lower leg: No edema.   Skin:     General: Skin is warm and dry.   Neurological:      Mental Status: He is alert and oriented to person, place, and time. Mental status is at baseline.      Cranial Nerves: Facial asymmetry present.      Comments: LUE numbness and weakness.  Left sided facial droop.    NIH- 6   Psychiatric:         Mood and Affect: Mood normal.                Medications in the Emergency Department:  Medications   sodium chloride 0.9 % flush 10 mL (has no administration in time range)   iopamidol (ISOVUE-370) 76 % injection 100 mL (100 mL Intravenous Given 1/19/22 1138)   alteplase (ACTIVASE) bolus from vial (9 mg Intravenous Given 1/19/22 1134)   alteplase (ACTIVASE) 1 mg/mL infusion vial (0 mg Intravenous Stopped 1/19/22 1245)   sodium chloride 0.9 % infusion 100 mL (0 mL Intravenous Stopped 1/19/22 1400)   iopamidol (ISOVUE-370) 76 % injection 100 mL (100 mL Intravenous Given  1/19/22 1145)   morphine 4 MG/ML injection  - ADS Override Pull (4 mg  Given 1/19/22 1328)        Labs  Lab Results (last 24 hours)     Procedure Component Value Units Date/Time    POC Glucose Once [[id number]]  (Normal) Collected: 01/19/22 1117    Specimen: Blood Updated: 01/19/22 1159     Glucose 88 mg/dL      Comment: Serial Number: [id number]:  [id number]       POC Creatinine [[id number]] Collected: 01/19/22 1124    Specimen: Blood Updated: 01/19/22 1143     Creatinine 0.80 mg/dL      Comment: Serial Number: [id number]:  [id number]        GFR MDRD  --     GFR MDRD Non African American 100 mL/min/1.73 sq.M     CBC & Differential [[id number]]  (Abnormal) Collected: 01/19/22 1132    Specimen: Blood Updated: 01/19/22 1141    Narrative:      The following orders were created for panel order CBC & Differential.  Procedure                               Abnormality         Status                     ---------                               -----------         ------                     CBC Auto Differential[[id number]]        Abnormal            Final result                 Please view results for these tests on the individual orders.    Comprehensive Metabolic Panel [[id number]]  (Abnormal) Collected: 01/19/22 1132    Specimen: Blood Updated: 01/19/22 1159     Glucose 95 mg/dL      BUN 13 mg/dL      Creatinine 0.96 mg/dL      Sodium 136 mmol/L      Potassium 3.9 mmol/L      Chloride 104 mmol/L      CO2 21.8 mmol/L      Calcium 8.7 mg/dL      Total Protein 7.0 g/dL      Albumin 3.20 g/dL      ALT (SGPT) 31 U/L      AST (SGOT) 85 U/L      Alkaline Phosphatase 128 U/L      Total Bilirubin 1.2 mg/dL      eGFR Non African Amer 81 mL/min/1.73      Globulin 3.8 gm/dL      A/G Ratio 0.8 g/dL      BUN/Creatinine Ratio 13.5     Anion Gap 10.2 mmol/L     Narrative:      GFR Normal >60  Chronic Kidney Disease <60  Kidney Failure <15      Protime-INR [[id number]]  (Abnormal) Collected: 01/19/22 1132    Specimen:  Blood Updated: 01/19/22 1205     Protime 14.9 Seconds      INR 1.50    Narrative:      Suggested Therapeutic Ranges For Oral Anticoagulant Therapy:  Level of Therapy                      INR Target Range  Standard Dose                            2.0-3.0  High Dose                                2.5-3.5  Patients not receiving anticoagulant  Therapy Normal Range                     0.6-1.2    aPTT [[id number]]  (Normal) Collected: 01/19/22 1132    Specimen: Blood Updated: 01/19/22 1205     PTT 29.0 seconds     Troponin [[id number]]  (Normal) Collected: 01/19/22 1132    Specimen: Blood Updated: 01/19/22 1159     Troponin T <0.010 ng/mL     Narrative:      Troponin T Reference Range:  <= 0.03 ng/mL-   Negative for AMI  >0.03 ng/mL-     Abnormal for myocardial necrosis.  Clinicians would have to utilize clinical acumen, EKG, Troponin and serial changes to determine if it is an Acute Myocardial Infarction or myocardial injury due to an underlying chronic condition.       Results may be falsely decreased if patient taking Biotin.      POC Creatinine with Hold Tube [[id number]] Collected: 01/19/22 1132    Specimen: Blood Updated: 01/19/22 1138    Narrative:      The following orders were created for panel order POC Creatinine with Hold Tube.  Procedure                               Abnormality         Status                     ---------                               -----------         ------                     POC Creatinine[[id number]]                                                              Hold Creatinine Tube[[id number]]                             Final result                 Please view results for these tests on the individual orders.    CBC Auto Differential [[id number]]  (Abnormal) Collected: 01/19/22 1132    Specimen: Blood Updated: 01/19/22 1141     WBC 2.09 10*3/mm3      RBC 3.62 10*6/mm3      Hemoglobin 11.0 g/dL      Hematocrit 32.5 %      MCV 89.8 fL      MCH 30.4 pg      MCHC 33.8 g/dL      RDW 15.2 %       RDW-SD 50.8 fl      MPV 11.4 fL      Platelets 80 10*3/mm3      Neutrophil % 40.6 %      Lymphocyte % 39.7 %      Monocyte % 17.7 %      Eosinophil % 1.0 %      Basophil % 0.5 %      Immature Grans % 0.5 %      Neutrophils, Absolute 0.85 10*3/mm3      Lymphocytes, Absolute 0.83 10*3/mm3      Monocytes, Absolute 0.37 10*3/mm3      Eosinophils, Absolute 0.02 10*3/mm3      Basophils, Absolute 0.01 10*3/mm3      Immature Grans, Absolute 0.01 10*3/mm3      nRBC 0.0 /100 WBC            Imaging:  CT Angiogram Neck    Result Date: 1/19/2022  PROCEDURE: CT ANGIOGRAM NECK  COMPARISON: [facility], CT, CT CHEST W CONTRAST DIAGNOSTIC, 11/16/2021, 11:40.  CT, CT ANGIOGRAM HEAD, 1/19/2022, 11:48.  INDICATIONS: Left facial droop, left side deficit, slurred speech.  PROTOCOL:   Standard imaging protocol performed    RADIATION:   DLP: 818.8mGy*cm   Automated exposure control was utilized to minimize radiation dose. CONTRAST: 100cc Isovue 370 I.V. LABS:   eGFR: >60ml/min/1.73m2  TECHNIQUE: After obtaining the patient's consent, CT images of the neck were obtained without and with non-ionic intravenous contrast material. Multi-planar reformatted/3-D images were created to optimize visualization of vascular anatomy. Unless otherwise stated in this report, all vascular stenoses involving the internal carotid arteries reported for this examination are derived by dividing the lesion diameter by the diameter of the normal internal carotid artery more distally.  FINDINGS:  LEFT INTERNAL CAROTID: No hemodynamically significant stenosis or dissection.  EXTERNAL CAROTID: No hemodynamically significant stenosis or dissection.  COMMON CAROTID: No hemodynamically significant stenosis or dissection.  VERTEBRAL: No hemodynamically significant stenosis or dissection.   RIGHT INTERNAL CAROTID: No hemodynamically significant stenosis or dissection.  EXTERNAL CAROTID: No hemodynamically significant stenosis or dissection.  COMMON  CAROTID: No hemodynamically significant stenosis or dissection.  VERTEBRAL: No hemodynamically significant stenosis or dissection.   OTHER: There are a few new nodules in the upper lobes measuring 1.3 cm and less in size.       No hemodynamically significant stenosis or dissection.   Several new upper lobe nodules measuring 1.3 cm and less in size.  Short-term follow-up complete chest CT recommended after the patient's acute neurologic illness has been addressed.     PURVI ROBERTS MD       Electronically Signed and Approved By: PURVI ROBERTS MD on 1/19/2022 at 12:31             XR Chest 1 View    Result Date: 1/19/2022  PROCEDURE: XR CHEST 1 VW  COMPARISON: [facility], CR, XR CHEST 1 VW, 11/18/2021, 11:24.  [facility], CR, XR CHEST 1 VW, 12/05/2021, 15:08.  [facility], CR, XR CHEST 1 VW, 12/05/2021, 19:07.  INDICATIONS: Acute Stroke Protocol (Onset < 12 hrs)/LEFT ARM NUMBNESS  FINDINGS:  The heart remains normal in size.  The lungs are well-expanded and free of infiltrates.  Bony structures appear intact.       No active disease is seen.       JENNIFER GALLARDO MD       Electronically Signed and Approved By: JENNIFER GALLARDO MD on 1/19/2022 at 12:25             CT Angiogram Head    Result Date: 1/19/2022  PROCEDURE: CT ANGIOGRAM HEAD  COMPARISON: [facility], CT, CT HEAD WO CONTRAST, 12/05/2021, 14:30.  INDICATIONS: Left facial droop, left side deficit, slurred speech.  PROTOCOL:   Standard imaging protocol performed    RADIATION:   DLP: 818.8mGy*cm   MA and/or KV was adjusted to minimize radiation dose.    CONTRAST: 100cc Isovue 370 I.V. LABS:   eGFR: >60ml/min/1.73m2  TECHNIQUE: After obtaining the patient's consent, CT images of the head were obtained without and with non-ionic contrast, and multi-planar/3-D imaging were created and interpreted to optimize visualization of vascular anatomy.   FINDINGS:  VASCULATURE: Normal.  No significant stenosis.  No visible  aneurysm or vascular malformation.  VENTRICLES: Normal for age.  No enlargement or displacement.  CEREBRUM: Cerebral volume loss appears somewhat advanced for patient age as discussed on today's noncontrast head CT. CEREBELLUM: Normal for age.  No excessive atrophy, mass, or hemorrhage, or abnormal enhancement.  BRAINSTEM: Normal for age.  No excessive atrophy, mass, or hemorrhage, or abnormal enhancement.  BASAL CISTERNS: Normal.  No subarachnoid hemorrhage or effacement.  SKULL: Negative.        Negative CT angiogram of head as above.     PURVI ROBERTS MD       Electronically Signed and Approved By: PURVI ROBERTS MD on 1/19/2022 at 12:33             CT Head Without Contrast Stroke Protocol    Result Date: 1/19/2022  PROCEDURE: CT HEAD WO CONTRAST STROKE PROTOCOL  COMPARISON:  [facility], CT, CT HEAD WO CONTRAST, 12/05/2021, 14:30. INDICATIONS: Left facial droop, left side deficit, slurred speech.  PROTOCOL:   Standard imaging protocol performed    RADIATION:   DLP: 1018.2mGy*cm   MA and/or KV was adjusted to minimize radiation dose.     TECHNIQUE: After obtaining the patient's consent, CT images were obtained without non-ionic intravenous contrast material.  FINDINGS:  No evidence of intracranial hemorrhage.  No areas of mass effect appreciated on noncontrast imaging the brain.  No midline shift.  No hydrocephalus.  No fracture or other acute osseous findings.  No significant mucosal thickening of the sinuses.  Cerebral volume loss is present which appears somewhat advanced for patient age.       No intracranial hemorrhage or evidence of other acute intracranial abnormality.  This stroke alert result was called to the ordering ER physician at 11:30 a.m..  CT angiogram and CT perfusion examination to follow soon  Cerebral volume loss appears somewhat advanced for patient age.      PURVI ROBERTS MD       Electronically Signed and Approved By: PURVI ROBERTS MD on 1/19/2022 at 11:32             CT CEREBRAL  PERFUSION WITH & WITHOUT CONTRAST    Result Date: 1/19/2022  PROCEDURE: CT CEREBRAL PERFUSION W WO CONTRAST  COMPARISON: CT, CT ANGIOGRAM HEAD, 1/19/2022, 11:48.  INDICATIONS: Left facial droop, left side deficit, slurred speech.  PROTOCOL:   Standard imaging protocol performed    RADIATION:   DLP: 1299.6mGy*cm   Automated exposure control was utilized to minimize radiation dose. CONTRAST: 100cc Isovue 370 I.V. LABS:   eGFR: >60ml/min/1.73m2   FINDINGS:   CBF:  No evidence of significant change in cerebral blood flow. CBV:  No evidence of significant change in cerebral blood volume. Tmax:  No evidence of significant increase in the T-max time. Mismatch volume: 0 ml Mismatch ratio: None    No dawood ischemic core demonstrated.  No definitive significant abnormal perfusion demonstrated either.       Negative CT cerebral perfusion examination.      PURVI ROBERTS MD       Electronically Signed and Approved By: PURVI ROBERTS MD on 1/19/2022 at 12:18               Procedures:  Procedures    Progress                            Medical Decision Making:  MDM  Number of Diagnoses or Management Options  Cerebrovascular accident (CVA), unspecified mechanism (HCC)  Diagnosis management comments: The patient´s CBC was reviewed and shows no abnormalities of critical concern. Of note, there is no anemia requiring a blood transfusion and the platelet count is acceptable.  The patient´s CMP was reviewed and shows no abnormalities of critical concern. Of note, the patient´s sodium and potassium are acceptable. The patient´s liver enzymes are unremarkable. The patient´s renal function (creatinine) is preserved. The patient has a normal anion gap.  INR is 1.5.  CT scan of the head is negative for acute intracranial abnormalities.  CT angio is negative.  The patient was within the window for tPA and he was given tPA in the ED.  He does have significant provement in his symptoms.  Case was discussed with [facility] neurology who does agree  with transfer.    Total Critical Care time of 45 minutes. Total critical care time documented does not include time spent on separately billed procedures for services of nurses or physician assistants. I personally saw and examined the patient. I have reviewed all diagnostic interpretations and treatment plans as written. I was present for the key portions of any procedures performed and the inclusive time noted in any critical care statement. Critical care time includes patient management by me, time spent at the patients bedside,  time to review lab and imaging results, discussing patient care, documentation in the medical record, and time spent with family or caregiver.       Amount and/or Complexity of Data Reviewed  Clinical lab tests: reviewed  Tests in the radiology section of CPT®: reviewed  Tests in the medicine section of CPT®: reviewed  Discussion of test results with the performing providers: yes  Discuss the patient with other providers: yes  Independent visualization of images, tracings, or specimens: yes    Risk of Complications, Morbidity, and/or Mortality  Presenting problems: moderate  Management options: moderate    Critical Care  Total time providing critical care: 30-74 minutes    Patient Progress  Patient progress: stable       Final diagnoses:   Cerebrovascular accident (CVA), unspecified mechanism (HCC)        Disposition:  ED Disposition     ED Disposition Condition Comment    Transfer to Another Facility             This medical record created using voice recognition software and may contain unintended errors.    Documentation assistance provided by Aleksandra Jensen acting as scribe for Carmen Barber MD. Information recorded by the scribe was done at my direction and has been verified and validated by me.          Aleksandra Jensen  01/19/22 7920       Carmen Barber MD  01/19/22 6080

## 2022-01-19 NOTE — CONSULTS
"Nic Nicolas is a 56 y.o. male who presents to [facility] for nutrition consult r/t diagnosis of T2DM.  Nic Nicolas is referred by Britney Echevarria A*.     Past Medical History:   Diagnosis Date   • Asthma    • Cirrhosis (HCC)    • Colon polyps    • Diabetes mellitus (HCC)    • Hypertension    • Liver disease    • Reflux esophagitis    • Renal disorder    • Sleep apnea        Anthropometrics  Ht Readings from Last 1 Encounters:   01/19/22 172.7 cm (68\")     Wt Readings from Last 1 Encounters:   01/19/22 113 kg (250 lb)     There is no height or weight on file to calculate BMI.     [facility]  Pt confirms significant wt loss from Dec when he was admitted to the hospital.  Wt at that time was 315#.    Medications  No current facility-administered medications for this encounter.     Current Outpatient Medications   Medication Sig Dispense Refill   • albuterol sulfate  (90 Base) MCG/ACT inhaler Inhale 2 puffs Every 4 (Four) Hours As Needed for Wheezing. 90 g 0   • Alcohol Swabs (Alcohol Prep) pads 1 pad 5 (Five) Times a Day. 200 each 3   • amLODIPine (NORVASC) 10 MG tablet Take 1 tablet by mouth Daily. 30 tablet 0   • baclofen (LIORESAL) 10 MG tablet Take 10 mg by mouth 3 (Three) Times a Day.     • busPIRone (BUSPAR) 5 MG tablet Take 1 tablet by mouth 3 (Three) Times a Day. 30 tablet 0   • furosemide (LASIX) 40 MG tablet Take 3 tablets by mouth 2 (Two) Times a Day for 15 days. 90 tablet 0   • glimepiride (Amaryl) 1 MG tablet Take 1 tablet by mouth Every Morning Before Breakfast for 30 days. 30 tablet 0   • Glucose Blood (Blood Glucose Test) strip Inject 1 strip under the skin into the appropriate area as directed 5 (Five) Times a Day. 100 each 3   • Insulin Glargine (LANTUS SOLOSTAR) 100 UNIT/ML injection pen Inject 66 Units under the skin into the appropriate area as directed Daily for 30 days. 15 pen 3   • lactulose (CHRONULAC) 10 GM/15ML " solution Take 30 mL by mouth 2 (Two) Times a Day. 1892 mL 3   • Lancets (freestyle) lancets 1 each by Other route As Needed (glucose check). 100 each 3   • magnesium oxide (MAG-OX) 400 MG tablet Take 1 tablet by mouth Daily. 90 tablet 1   • Morphine (MSIR) 15 MG tablet Take 1 tablet by mouth Every 8 (Eight) Hours As Needed for Severe Pain . 42 tablet 0   • omeprazole (priLOSEC) 20 MG capsule Take 20 mg by mouth Daily.     • ondansetron ODT (ZOFRAN-ODT) 4 MG disintegrating tablet Place 1 tablet under the tongue 4 (Four) Times a Day As Needed for Nausea or Vomiting. 60 tablet 0   • Potassium 99 MG tablet Apply 99 mg to the mouth or throat 2 (Two) Times a Day. 180 each 1   • propranolol (INDERAL) 20 MG tablet Take 3 tablets by mouth 2 (Two) Times a Day for 30 days. 180 tablet 1   • riFAXIMin (XIFAXAN) 550 MG tablet Take 1 tablet by mouth Every 12 (Twelve) Hours. Indications: Impaired Brain Function due to Liver Disease 180 tablet 3   • sertraline (ZOLOFT) 50 MG tablet Take 50 mg by mouth Daily.     • spironolactone (ALDACTONE) 100 MG tablet Take 1 tablet by mouth 2 (Two) Times a Day. 60 tablet 0     Facility-Administered Medications Ordered in Other Encounters   Medication Dose Route Frequency Provider Last Rate Last Admin   • alteplase (ACTIVASE) 1 mg/mL infusion vial  81 mg Intravenous Once Carmen Barber MD 81 mL/hr at 01/19/22 1145 81 mg at 01/19/22 1145   • sodium chloride 0.9 % flush 10 mL  10 mL Intravenous PRN Carmen Barber MD       • sodium chloride 0.9 % infusion 100 mL  100 mL Intravenous Once Carmen Barber MD           Labs   Lab Results   Component Value Date    HGBA1C 7.10 (H) 11/02/2021       Upon start of DMNT visit, pt was complaining of shooting pain down his left arm, also noted numbness.  Notified Diabetes practice manager and Diabetes APRN.  B/P checked, 136/75.  Noted slow speech, pt noted pain started recently and also noted having a headache, which he states never happens.   After an eval by the APRN, she determined the pt should go to the ED, and because the family member that brought him to his appt today was unavailable, EMS was notified and pt was transferred to ED.    Rain Roblero RDN, LD  01/19/2022

## 2022-01-20 ENCOUNTER — APPOINTMENT (OUTPATIENT)
Dept: CT IMAGING | Facility: HOSPITAL | Age: 57
End: 2022-01-20

## 2022-01-20 ENCOUNTER — APPOINTMENT (OUTPATIENT)
Dept: CARDIOLOGY | Facility: HOSPITAL | Age: 57
End: 2022-01-20

## 2022-01-20 ENCOUNTER — APPOINTMENT (OUTPATIENT)
Dept: MRI IMAGING | Facility: HOSPITAL | Age: 57
End: 2022-01-20

## 2022-01-20 LAB
AORTIC DIMENSIONLESS INDEX: 0.8 (DI)
ASCENDING AORTA: 2.6 CM
B PARAPERT DNA SPEC QL NAA+PROBE: NOT DETECTED
B PERT DNA SPEC QL NAA+PROBE: NOT DETECTED
BH CV ECHO MEAS - ACS: 2.1 CM
BH CV ECHO MEAS - AO ARCH DIAM (PROXIMAL TRANS.): 2.2 CM
BH CV ECHO MEAS - AO MAX PG (FULL): 8.3 MMHG
BH CV ECHO MEAS - AO MAX PG: 15.9 MMHG
BH CV ECHO MEAS - AO MEAN PG (FULL): 4 MMHG
BH CV ECHO MEAS - AO MEAN PG: 7.9 MMHG
BH CV ECHO MEAS - AO ROOT AREA (BSA CORRECTED): 1.5
BH CV ECHO MEAS - AO ROOT AREA: 8.8 CM^2
BH CV ECHO MEAS - AO ROOT DIAM: 3.3 CM
BH CV ECHO MEAS - AO V2 MAX: 199.5 CM/SEC
BH CV ECHO MEAS - AO V2 MEAN: 130.4 CM/SEC
BH CV ECHO MEAS - AO V2 VTI: 38.9 CM
BH CV ECHO MEAS - ASC AORTA: 2.6 CM
BH CV ECHO MEAS - AVA(I,A): 3.1 CM^2
BH CV ECHO MEAS - AVA(I,D): 3.1 CM^2
BH CV ECHO MEAS - AVA(V,A): 2.6 CM^2
BH CV ECHO MEAS - AVA(V,D): 2.6 CM^2
BH CV ECHO MEAS - BSA(HAYCOCK): 2.4 M^2
BH CV ECHO MEAS - BSA: 2.3 M^2
BH CV ECHO MEAS - BZI_BMI: 38.2 KILOGRAMS/M^2
BH CV ECHO MEAS - BZI_METRIC_HEIGHT: 172.7 CM
BH CV ECHO MEAS - BZI_METRIC_WEIGHT: 113.9 KG
BH CV ECHO MEAS - EDV(CUBED): 178.6 ML
BH CV ECHO MEAS - EDV(MOD-SP2): 144 ML
BH CV ECHO MEAS - EDV(MOD-SP4): 153 ML
BH CV ECHO MEAS - EDV(TEICH): 155.6 ML
BH CV ECHO MEAS - EF(CUBED): 81.4 %
BH CV ECHO MEAS - EF(MOD-BP): 58.9 %
BH CV ECHO MEAS - EF(MOD-SP2): 54.2 %
BH CV ECHO MEAS - EF(MOD-SP4): 62.7 %
BH CV ECHO MEAS - EF(TEICH): 73.4 %
BH CV ECHO MEAS - ESV(CUBED): 33.2 ML
BH CV ECHO MEAS - ESV(MOD-SP2): 66 ML
BH CV ECHO MEAS - ESV(MOD-SP4): 57 ML
BH CV ECHO MEAS - ESV(TEICH): 41.4 ML
BH CV ECHO MEAS - FS: 42.9 %
BH CV ECHO MEAS - IVS/LVPW: 0.89
BH CV ECHO MEAS - IVSD: 1 CM
BH CV ECHO MEAS - LAT PEAK E' VEL: 15.4 CM/SEC
BH CV ECHO MEAS - LV DIASTOLIC VOL/BSA (35-75): 68 ML/M^2
BH CV ECHO MEAS - LV MASS(C)D: 240.2 GRAMS
BH CV ECHO MEAS - LV MASS(C)DI: 106.7 GRAMS/M^2
BH CV ECHO MEAS - LV MAX PG: 7.7 MMHG
BH CV ECHO MEAS - LV MEAN PG: 3.9 MMHG
BH CV ECHO MEAS - LV SYSTOLIC VOL/BSA (12-30): 25.3 ML/M^2
BH CV ECHO MEAS - LV V1 MAX: 138.4 CM/SEC
BH CV ECHO MEAS - LV V1 MEAN: 91.8 CM/SEC
BH CV ECHO MEAS - LV V1 VTI: 33 CM
BH CV ECHO MEAS - LVIDD: 5.6 CM
BH CV ECHO MEAS - LVIDS: 3.2 CM
BH CV ECHO MEAS - LVLD AP2: 9.6 CM
BH CV ECHO MEAS - LVLD AP4: 9.4 CM
BH CV ECHO MEAS - LVLS AP2: 8 CM
BH CV ECHO MEAS - LVLS AP4: 7.9 CM
BH CV ECHO MEAS - LVOT AREA (M): 3.8 CM^2
BH CV ECHO MEAS - LVOT AREA: 3.7 CM^2
BH CV ECHO MEAS - LVOT DIAM: 2.2 CM
BH CV ECHO MEAS - LVPWD: 1.1 CM
BH CV ECHO MEAS - MED PEAK E' VEL: 8.9 CM/SEC
BH CV ECHO MEAS - MV A DUR: 0.12 SEC
BH CV ECHO MEAS - MV A MAX VEL: 102.4 CM/SEC
BH CV ECHO MEAS - MV DEC SLOPE: 352.8 CM/SEC^2
BH CV ECHO MEAS - MV DEC TIME: 309 SEC
BH CV ECHO MEAS - MV E MAX VEL: 104 CM/SEC
BH CV ECHO MEAS - MV E/A: 1
BH CV ECHO MEAS - MV MAX PG: 3.6 MMHG
BH CV ECHO MEAS - MV MEAN PG: 2.2 MMHG
BH CV ECHO MEAS - MV P1/2T MAX VEL: 116.7 CM/SEC
BH CV ECHO MEAS - MV P1/2T: 96.9 MSEC
BH CV ECHO MEAS - MV V2 MAX: 95 CM/SEC
BH CV ECHO MEAS - MV V2 MEAN: 70.1 CM/SEC
BH CV ECHO MEAS - MV V2 VTI: 27.6 CM
BH CV ECHO MEAS - MVA P1/2T LCG: 1.9 CM^2
BH CV ECHO MEAS - MVA(P1/2T): 2.3 CM^2
BH CV ECHO MEAS - MVA(VTI): 4.4 CM^2
BH CV ECHO MEAS - PA ACC TIME: 0.18 SEC
BH CV ECHO MEAS - PA MAX PG (FULL): 0.44 MMHG
BH CV ECHO MEAS - PA MAX PG: 5.3 MMHG
BH CV ECHO MEAS - PA PR(ACCEL): -1.8 MMHG
BH CV ECHO MEAS - PA V2 MAX: 115.4 CM/SEC
BH CV ECHO MEAS - PULM A REVS DUR: 0.13 SEC
BH CV ECHO MEAS - PULM A REVS VEL: 31.3 CM/SEC
BH CV ECHO MEAS - PULM DIAS VEL: 38.6 CM/SEC
BH CV ECHO MEAS - PULM S/D: 2.1
BH CV ECHO MEAS - PULM SYS VEL: 80.6 CM/SEC
BH CV ECHO MEAS - RV MAX PG: 4.9 MMHG
BH CV ECHO MEAS - RV MEAN PG: 2.6 MMHG
BH CV ECHO MEAS - RV V1 MAX: 110.5 CM/SEC
BH CV ECHO MEAS - RV V1 MEAN: 77 CM/SEC
BH CV ECHO MEAS - RV V1 VTI: 24.6 CM
BH CV ECHO MEAS - SI(AO): 151.9 ML/M^2
BH CV ECHO MEAS - SI(CUBED): 64.6 ML/M^2
BH CV ECHO MEAS - SI(LVOT): 54.1 ML/M^2
BH CV ECHO MEAS - SI(MOD-SP2): 34.7 ML/M^2
BH CV ECHO MEAS - SI(MOD-SP4): 42.6 ML/M^2
BH CV ECHO MEAS - SI(TEICH): 50.8 ML/M^2
BH CV ECHO MEAS - SV(AO): 341.9 ML
BH CV ECHO MEAS - SV(CUBED): 145.4 ML
BH CV ECHO MEAS - SV(LVOT): 121.7 ML
BH CV ECHO MEAS - SV(MOD-SP2): 78 ML
BH CV ECHO MEAS - SV(MOD-SP4): 96 ML
BH CV ECHO MEAS - SV(TEICH): 114.2 ML
BH CV ECHO MEAS - TAPSE (>1.6): 3.5 CM
BH CV ECHO MEASUREMENTS AVERAGE E/E' RATIO: 8.56
BH CV XLRA - RV BASE: 3.8 CM
BH CV XLRA - RV LENGTH: 6.9 CM
BH CV XLRA - RV MID: 2.8 CM
BH CV XLRA - TDI S': 22.5 CM/SEC
C PNEUM DNA NPH QL NAA+NON-PROBE: NOT DETECTED
CHOLEST SERPL-MCNC: 96 MG/DL (ref 0–200)
FLUAV SUBTYP SPEC NAA+PROBE: NOT DETECTED
FLUBV RNA ISLT QL NAA+PROBE: NOT DETECTED
GLUCOSE BLDC GLUCOMTR-MCNC: 109 MG/DL (ref 70–130)
GLUCOSE BLDC GLUCOMTR-MCNC: 60 MG/DL (ref 70–130)
GLUCOSE BLDC GLUCOMTR-MCNC: 69 MG/DL (ref 70–130)
GLUCOSE BLDC GLUCOMTR-MCNC: 90 MG/DL (ref 70–130)
GLUCOSE BLDC GLUCOMTR-MCNC: 91 MG/DL (ref 70–130)
HADV DNA SPEC NAA+PROBE: NOT DETECTED
HBA1C MFR BLD: 5.56 % (ref 4.8–5.6)
HCOV 229E RNA SPEC QL NAA+PROBE: NOT DETECTED
HCOV HKU1 RNA SPEC QL NAA+PROBE: NOT DETECTED
HCOV NL63 RNA SPEC QL NAA+PROBE: NOT DETECTED
HCOV OC43 RNA SPEC QL NAA+PROBE: NOT DETECTED
HDLC SERPL-MCNC: 35 MG/DL (ref 40–60)
HMPV RNA NPH QL NAA+NON-PROBE: NOT DETECTED
HPIV1 RNA ISLT QL NAA+PROBE: NOT DETECTED
HPIV2 RNA SPEC QL NAA+PROBE: NOT DETECTED
HPIV3 RNA NPH QL NAA+PROBE: NOT DETECTED
HPIV4 P GENE NPH QL NAA+PROBE: NOT DETECTED
LDLC SERPL CALC-MCNC: 45 MG/DL (ref 0–100)
LDLC/HDLC SERPL: 1.3 {RATIO}
LEFT ATRIUM VOLUME INDEX: 33.6 ML/M2
LV EF 2D ECHO EST: 59 %
M PNEUMO IGG SER IA-ACNC: NOT DETECTED
MAXIMAL PREDICTED HEART RATE: 165 BPM
RHINOVIRUS RNA SPEC NAA+PROBE: NOT DETECTED
RSV RNA NPH QL NAA+NON-PROBE: NOT DETECTED
SARS-COV-2 RNA NPH QL NAA+NON-PROBE: DETECTED
SINUS: 3.2 CM
STJ: 3 CM
STRESS TARGET HR: 140 BPM
TRIGL SERPL-MCNC: 78 MG/DL (ref 0–150)
VLDLC SERPL-MCNC: 16 MG/DL (ref 5–40)

## 2022-01-20 PROCEDURE — 70450 CT HEAD/BRAIN W/O DYE: CPT

## 2022-01-20 PROCEDURE — 25010000002 DIPHENHYDRAMINE PER 50 MG: Performed by: STUDENT IN AN ORGANIZED HEALTH CARE EDUCATION/TRAINING PROGRAM

## 2022-01-20 PROCEDURE — 25010000002 PROCHLORPERAZINE 10 MG/2ML SOLUTION: Performed by: STUDENT IN AN ORGANIZED HEALTH CARE EDUCATION/TRAINING PROGRAM

## 2022-01-20 PROCEDURE — 80061 LIPID PANEL: CPT | Performed by: INTERNAL MEDICINE

## 2022-01-20 PROCEDURE — 83036 HEMOGLOBIN GLYCOSYLATED A1C: CPT | Performed by: INTERNAL MEDICINE

## 2022-01-20 PROCEDURE — 0202U NFCT DS 22 TRGT SARS-COV-2: CPT | Performed by: INTERNAL MEDICINE

## 2022-01-20 PROCEDURE — 25010000002 PERFLUTREN (DEFINITY) 8.476 MG IN SODIUM CHLORIDE (PF) 0.9 % 10 ML INJECTION: Performed by: INTERNAL MEDICINE

## 2022-01-20 PROCEDURE — 99222 1ST HOSP IP/OBS MODERATE 55: CPT | Performed by: STUDENT IN AN ORGANIZED HEALTH CARE EDUCATION/TRAINING PROGRAM

## 2022-01-20 PROCEDURE — 70551 MRI BRAIN STEM W/O DYE: CPT

## 2022-01-20 PROCEDURE — 92610 EVALUATE SWALLOWING FUNCTION: CPT | Performed by: SPEECH-LANGUAGE PATHOLOGIST

## 2022-01-20 PROCEDURE — 93306 TTE W/DOPPLER COMPLETE: CPT | Performed by: INTERNAL MEDICINE

## 2022-01-20 PROCEDURE — 25010000002 MORPHINE PER 10 MG: Performed by: INTERNAL MEDICINE

## 2022-01-20 PROCEDURE — 93306 TTE W/DOPPLER COMPLETE: CPT

## 2022-01-20 PROCEDURE — 82962 GLUCOSE BLOOD TEST: CPT

## 2022-01-20 RX ORDER — PROCHLORPERAZINE EDISYLATE 5 MG/ML
10 INJECTION INTRAMUSCULAR; INTRAVENOUS ONCE
Status: COMPLETED | OUTPATIENT
Start: 2022-01-20 | End: 2022-01-20

## 2022-01-20 RX ORDER — DIPHENHYDRAMINE HYDROCHLORIDE 50 MG/ML
25 INJECTION INTRAMUSCULAR; INTRAVENOUS ONCE
Status: COMPLETED | OUTPATIENT
Start: 2022-01-20 | End: 2022-01-20

## 2022-01-20 RX ORDER — ASPIRIN 81 MG/1
81 TABLET, CHEWABLE ORAL DAILY
Status: DISCONTINUED | OUTPATIENT
Start: 2022-01-20 | End: 2022-01-21

## 2022-01-20 RX ADMIN — BUSPIRONE HYDROCHLORIDE 5 MG: 15 TABLET ORAL at 22:25

## 2022-01-20 RX ADMIN — SODIUM CHLORIDE, PRESERVATIVE FREE 10 ML: 5 INJECTION INTRAVENOUS at 22:54

## 2022-01-20 RX ADMIN — PROCHLORPERAZINE EDISYLATE 10 MG: 5 INJECTION INTRAMUSCULAR; INTRAVENOUS at 05:32

## 2022-01-20 RX ADMIN — MORPHINE SULFATE 2 MG: 2 INJECTION, SOLUTION INTRAMUSCULAR; INTRAVENOUS at 16:36

## 2022-01-20 RX ADMIN — PERFLUTREN 2 ML: 6.52 INJECTION, SUSPENSION INTRAVENOUS at 11:15

## 2022-01-20 RX ADMIN — BUSPIRONE HYDROCHLORIDE 5 MG: 15 TABLET ORAL at 01:02

## 2022-01-20 RX ADMIN — SODIUM CHLORIDE, PRESERVATIVE FREE 10 ML: 5 INJECTION INTRAVENOUS at 08:30

## 2022-01-20 RX ADMIN — BACLOFEN 10 MG: 10 TABLET ORAL at 22:26

## 2022-01-20 RX ADMIN — BACLOFEN 10 MG: 10 TABLET ORAL at 08:39

## 2022-01-20 RX ADMIN — PANTOPRAZOLE SODIUM 40 MG: 40 TABLET, DELAYED RELEASE ORAL at 06:53

## 2022-01-20 RX ADMIN — SODIUM CHLORIDE, PRESERVATIVE FREE 10 ML: 5 INJECTION INTRAVENOUS at 08:29

## 2022-01-20 RX ADMIN — MORPHINE SULFATE 2 MG: 2 INJECTION, SOLUTION INTRAMUSCULAR; INTRAVENOUS at 03:57

## 2022-01-20 RX ADMIN — RIFAXIMIN 550 MG: 550 TABLET ORAL at 08:39

## 2022-01-20 RX ADMIN — MORPHINE SULFATE 2 MG: 2 INJECTION, SOLUTION INTRAMUSCULAR; INTRAVENOUS at 12:35

## 2022-01-20 RX ADMIN — BUSPIRONE HYDROCHLORIDE 5 MG: 15 TABLET ORAL at 08:40

## 2022-01-20 RX ADMIN — MORPHINE SULFATE 2 MG: 2 INJECTION, SOLUTION INTRAMUSCULAR; INTRAVENOUS at 08:40

## 2022-01-20 RX ADMIN — ATORVASTATIN CALCIUM 80 MG: 80 TABLET, FILM COATED ORAL at 22:25

## 2022-01-20 RX ADMIN — SODIUM CHLORIDE 75 ML/HR: 9 INJECTION, SOLUTION INTRAVENOUS at 22:32

## 2022-01-20 RX ADMIN — SERTRALINE HYDROCHLORIDE 50 MG: 50 TABLET, FILM COATED ORAL at 08:40

## 2022-01-20 RX ADMIN — RIFAXIMIN 550 MG: 550 TABLET ORAL at 01:01

## 2022-01-20 RX ADMIN — BACLOFEN 10 MG: 10 TABLET ORAL at 01:03

## 2022-01-20 RX ADMIN — RIFAXIMIN 550 MG: 550 TABLET ORAL at 22:26

## 2022-01-20 RX ADMIN — HYDROCODONE BITARTRATE AND ACETAMINOPHEN 1 TABLET: 5; 325 TABLET ORAL at 22:23

## 2022-01-20 RX ADMIN — SODIUM CHLORIDE 75 ML/HR: 9 INJECTION, SOLUTION INTRAVENOUS at 12:35

## 2022-01-20 RX ADMIN — DIPHENHYDRAMINE HYDROCHLORIDE 25 MG: 50 INJECTION, SOLUTION INTRAMUSCULAR; INTRAVENOUS at 05:31

## 2022-01-21 LAB
GLUCOSE BLDC GLUCOMTR-MCNC: 107 MG/DL (ref 70–130)
GLUCOSE BLDC GLUCOMTR-MCNC: 112 MG/DL (ref 70–130)
GLUCOSE BLDC GLUCOMTR-MCNC: 129 MG/DL (ref 70–130)
GLUCOSE BLDC GLUCOMTR-MCNC: 129 MG/DL (ref 70–130)
GLUCOSE BLDC GLUCOMTR-MCNC: 88 MG/DL (ref 70–130)

## 2022-01-21 PROCEDURE — 97530 THERAPEUTIC ACTIVITIES: CPT

## 2022-01-21 PROCEDURE — 25010000002 MORPHINE PER 10 MG: Performed by: INTERNAL MEDICINE

## 2022-01-21 PROCEDURE — 97166 OT EVAL MOD COMPLEX 45 MIN: CPT

## 2022-01-21 PROCEDURE — 97162 PT EVAL MOD COMPLEX 30 MIN: CPT

## 2022-01-21 PROCEDURE — 97116 GAIT TRAINING THERAPY: CPT

## 2022-01-21 PROCEDURE — 99233 SBSQ HOSP IP/OBS HIGH 50: CPT | Performed by: NURSE PRACTITIONER

## 2022-01-21 PROCEDURE — 82962 GLUCOSE BLOOD TEST: CPT

## 2022-01-21 RX ORDER — ATORVASTATIN CALCIUM 20 MG/1
40 TABLET, FILM COATED ORAL DAILY
Status: DISCONTINUED | OUTPATIENT
Start: 2022-01-21 | End: 2022-01-30 | Stop reason: HOSPADM

## 2022-01-21 RX ORDER — BACLOFEN 10 MG/1
10 TABLET ORAL EVERY 8 HOURS PRN
Status: DISCONTINUED | OUTPATIENT
Start: 2022-01-21 | End: 2022-01-30 | Stop reason: HOSPADM

## 2022-01-21 RX ADMIN — SERTRALINE HYDROCHLORIDE 50 MG: 50 TABLET, FILM COATED ORAL at 09:50

## 2022-01-21 RX ADMIN — BACLOFEN 10 MG: 10 TABLET ORAL at 09:50

## 2022-01-21 RX ADMIN — SODIUM CHLORIDE, PRESERVATIVE FREE 10 ML: 5 INJECTION INTRAVENOUS at 21:40

## 2022-01-21 RX ADMIN — MORPHINE SULFATE 2 MG: 2 INJECTION, SOLUTION INTRAMUSCULAR; INTRAVENOUS at 09:50

## 2022-01-21 RX ADMIN — MORPHINE SULFATE 2 MG: 2 INJECTION, SOLUTION INTRAMUSCULAR; INTRAVENOUS at 04:27

## 2022-01-21 RX ADMIN — SODIUM CHLORIDE, PRESERVATIVE FREE 10 ML: 5 INJECTION INTRAVENOUS at 09:50

## 2022-01-21 RX ADMIN — Medication 400 MG: at 16:30

## 2022-01-21 RX ADMIN — ASPIRIN 81 MG: 81 TABLET, CHEWABLE ORAL at 09:50

## 2022-01-21 RX ADMIN — LACTULOSE 20 G: 20 SOLUTION ORAL at 09:50

## 2022-01-21 RX ADMIN — RIFAXIMIN 550 MG: 550 TABLET ORAL at 21:37

## 2022-01-21 RX ADMIN — ATORVASTATIN CALCIUM 40 MG: 20 TABLET, FILM COATED ORAL at 16:30

## 2022-01-21 RX ADMIN — HYDROCODONE BITARTRATE AND ACETAMINOPHEN 1 TABLET: 5; 325 TABLET ORAL at 21:38

## 2022-01-21 RX ADMIN — MORPHINE SULFATE 2 MG: 2 INJECTION, SOLUTION INTRAMUSCULAR; INTRAVENOUS at 14:12

## 2022-01-21 RX ADMIN — BUSPIRONE HYDROCHLORIDE 5 MG: 15 TABLET ORAL at 09:50

## 2022-01-21 RX ADMIN — SODIUM CHLORIDE 75 ML/HR: 9 INJECTION, SOLUTION INTRAVENOUS at 21:43

## 2022-01-21 RX ADMIN — BACLOFEN 10 MG: 10 TABLET ORAL at 17:32

## 2022-01-21 RX ADMIN — BUSPIRONE HYDROCHLORIDE 5 MG: 15 TABLET ORAL at 21:37

## 2022-01-21 RX ADMIN — RIFAXIMIN 550 MG: 550 TABLET ORAL at 09:50

## 2022-01-21 RX ADMIN — MAGNESIUM OXIDE 400 MG (241.3 MG MAGNESIUM) TABLET 400 MG: TABLET at 17:33

## 2022-01-21 RX ADMIN — MORPHINE SULFATE 15 MG: 15 TABLET ORAL at 17:33

## 2022-01-21 RX ADMIN — PANTOPRAZOLE SODIUM 40 MG: 40 TABLET, DELAYED RELEASE ORAL at 09:50

## 2022-01-21 RX ADMIN — BUSPIRONE HYDROCHLORIDE 5 MG: 15 TABLET ORAL at 16:30

## 2022-01-22 ENCOUNTER — APPOINTMENT (OUTPATIENT)
Dept: GENERAL RADIOLOGY | Facility: HOSPITAL | Age: 57
End: 2022-01-22

## 2022-01-22 ENCOUNTER — APPOINTMENT (OUTPATIENT)
Dept: CT IMAGING | Facility: HOSPITAL | Age: 57
End: 2022-01-22

## 2022-01-22 LAB
ANION GAP SERPL CALCULATED.3IONS-SCNC: 5.7 MMOL/L (ref 5–15)
BASOPHILS # BLD MANUAL: 0.01 10*3/MM3 (ref 0–0.2)
BASOPHILS NFR BLD MANUAL: 1.1 % (ref 0–1.5)
BUN SERPL-MCNC: 10 MG/DL (ref 6–20)
BUN/CREAT SERPL: 14.7 (ref 7–25)
CALCIUM SPEC-SCNC: 7.6 MG/DL (ref 8.6–10.5)
CHLORIDE SERPL-SCNC: 109 MMOL/L (ref 98–107)
CO2 SERPL-SCNC: 21.3 MMOL/L (ref 22–29)
CREAT SERPL-MCNC: 0.68 MG/DL (ref 0.76–1.27)
DEPRECATED RDW RBC AUTO: 46.8 FL (ref 37–54)
ERYTHROCYTE [DISTWIDTH] IN BLOOD BY AUTOMATED COUNT: 14.3 % (ref 12.3–15.4)
GFR SERPL CREATININE-BSD FRML MDRD: 121 ML/MIN/1.73
GLUCOSE BLDC GLUCOMTR-MCNC: 104 MG/DL (ref 70–130)
GLUCOSE BLDC GLUCOMTR-MCNC: 118 MG/DL (ref 70–130)
GLUCOSE BLDC GLUCOMTR-MCNC: 140 MG/DL (ref 70–130)
GLUCOSE BLDC GLUCOMTR-MCNC: 154 MG/DL (ref 70–130)
GLUCOSE BLDC GLUCOMTR-MCNC: 98 MG/DL (ref 70–130)
GLUCOSE SERPL-MCNC: 102 MG/DL (ref 65–99)
HCT VFR BLD AUTO: 25.6 % (ref 37.5–51)
HGB BLD-MCNC: 8.8 G/DL (ref 13–17.7)
LYMPHOCYTES # BLD MANUAL: 0.44 10*3/MM3 (ref 0.7–3.1)
LYMPHOCYTES NFR BLD MANUAL: 5.3 % (ref 5–12)
MCH RBC QN AUTO: 30.1 PG (ref 26.6–33)
MCHC RBC AUTO-ENTMCNC: 34.4 G/DL (ref 31.5–35.7)
MCV RBC AUTO: 87.7 FL (ref 79–97)
MONOCYTES # BLD: 0.07 10*3/MM3 (ref 0.1–0.9)
NEUTROPHILS # BLD AUTO: 0.81 10*3/MM3 (ref 1.7–7)
NEUTROPHILS NFR BLD MANUAL: 60.6 % (ref 42.7–76)
PLAT MORPH BLD: NORMAL
PLATELET # BLD AUTO: 67 10*3/MM3 (ref 140–450)
PMV BLD AUTO: 10.9 FL (ref 6–12)
POTASSIUM SERPL-SCNC: 3.9 MMOL/L (ref 3.5–5.2)
RBC # BLD AUTO: 2.92 10*6/MM3 (ref 4.14–5.8)
RBC MORPH BLD: NORMAL
SMUDGE CELLS BLD QL SMEAR: ABNORMAL
SODIUM SERPL-SCNC: 136 MMOL/L (ref 136–145)
VARIANT LYMPHS NFR BLD MANUAL: 1.1 % (ref 0–5)
VARIANT LYMPHS NFR BLD MANUAL: 31.9 % (ref 19.6–45.3)
WBC NRBC COR # BLD: 1.34 10*3/MM3 (ref 3.4–10.8)

## 2022-01-22 PROCEDURE — 85025 COMPLETE CBC W/AUTO DIFF WBC: CPT | Performed by: INTERNAL MEDICINE

## 2022-01-22 PROCEDURE — 85007 BL SMEAR W/DIFF WBC COUNT: CPT | Performed by: INTERNAL MEDICINE

## 2022-01-22 PROCEDURE — 82962 GLUCOSE BLOOD TEST: CPT

## 2022-01-22 PROCEDURE — 73030 X-RAY EXAM OF SHOULDER: CPT

## 2022-01-22 PROCEDURE — 71260 CT THORAX DX C+: CPT

## 2022-01-22 PROCEDURE — 80048 BASIC METABOLIC PNL TOTAL CA: CPT | Performed by: INTERNAL MEDICINE

## 2022-01-22 PROCEDURE — 25010000002 IOPAMIDOL 61 % SOLUTION: Performed by: INTERNAL MEDICINE

## 2022-01-22 RX ADMIN — HYDROCODONE BITARTRATE AND ACETAMINOPHEN 1 TABLET: 5; 325 TABLET ORAL at 21:41

## 2022-01-22 RX ADMIN — BUSPIRONE HYDROCHLORIDE 5 MG: 15 TABLET ORAL at 21:41

## 2022-01-22 RX ADMIN — BUSPIRONE HYDROCHLORIDE 5 MG: 15 TABLET ORAL at 18:55

## 2022-01-22 RX ADMIN — PANTOPRAZOLE SODIUM 40 MG: 40 TABLET, DELAYED RELEASE ORAL at 06:18

## 2022-01-22 RX ADMIN — BUSPIRONE HYDROCHLORIDE 5 MG: 15 TABLET ORAL at 10:06

## 2022-01-22 RX ADMIN — IOPAMIDOL 75 ML: 612 INJECTION, SOLUTION INTRAVENOUS at 10:29

## 2022-01-22 RX ADMIN — SODIUM CHLORIDE, PRESERVATIVE FREE 10 ML: 5 INJECTION INTRAVENOUS at 10:07

## 2022-01-22 RX ADMIN — ATORVASTATIN CALCIUM 40 MG: 20 TABLET, FILM COATED ORAL at 10:05

## 2022-01-22 RX ADMIN — HYDROCODONE BITARTRATE AND ACETAMINOPHEN 1 TABLET: 5; 325 TABLET ORAL at 14:39

## 2022-01-22 RX ADMIN — MORPHINE SULFATE 15 MG: 15 TABLET ORAL at 01:41

## 2022-01-22 RX ADMIN — Medication 400 MG: at 10:06

## 2022-01-22 RX ADMIN — RIFAXIMIN 550 MG: 550 TABLET ORAL at 21:41

## 2022-01-22 RX ADMIN — HYDROCODONE BITARTRATE AND ACETAMINOPHEN 1 TABLET: 5; 325 TABLET ORAL at 06:18

## 2022-01-22 RX ADMIN — MORPHINE SULFATE 15 MG: 15 TABLET ORAL at 10:39

## 2022-01-22 RX ADMIN — MORPHINE SULFATE 15 MG: 15 TABLET ORAL at 18:54

## 2022-01-22 RX ADMIN — SERTRALINE HYDROCHLORIDE 50 MG: 50 TABLET, FILM COATED ORAL at 10:06

## 2022-01-22 RX ADMIN — MAGNESIUM OXIDE 400 MG (241.3 MG MAGNESIUM) TABLET 400 MG: TABLET at 10:06

## 2022-01-22 RX ADMIN — RIFAXIMIN 550 MG: 550 TABLET ORAL at 10:06

## 2022-01-23 LAB
ANION GAP SERPL CALCULATED.3IONS-SCNC: 5.3 MMOL/L (ref 5–15)
ANISOCYTOSIS BLD QL: ABNORMAL
BUN SERPL-MCNC: 10 MG/DL (ref 6–20)
BUN/CREAT SERPL: 14.7 (ref 7–25)
CALCIUM SPEC-SCNC: 7.7 MG/DL (ref 8.6–10.5)
CHLORIDE SERPL-SCNC: 110 MMOL/L (ref 98–107)
CO2 SERPL-SCNC: 21.7 MMOL/L (ref 22–29)
CREAT SERPL-MCNC: 0.68 MG/DL (ref 0.76–1.27)
DEPRECATED RDW RBC AUTO: 50.3 FL (ref 37–54)
ELLIPTOCYTES BLD QL SMEAR: ABNORMAL
EOSINOPHIL # BLD MANUAL: 0.08 10*3/MM3 (ref 0–0.4)
EOSINOPHIL NFR BLD MANUAL: 6 % (ref 0.3–6.2)
ERYTHROCYTE [DISTWIDTH] IN BLOOD BY AUTOMATED COUNT: 15.1 % (ref 12.3–15.4)
GFR SERPL CREATININE-BSD FRML MDRD: 121 ML/MIN/1.73
GLUCOSE BLDC GLUCOMTR-MCNC: 100 MG/DL (ref 70–130)
GLUCOSE BLDC GLUCOMTR-MCNC: 101 MG/DL (ref 70–130)
GLUCOSE BLDC GLUCOMTR-MCNC: 119 MG/DL (ref 70–130)
GLUCOSE BLDC GLUCOMTR-MCNC: 127 MG/DL (ref 70–130)
GLUCOSE BLDC GLUCOMTR-MCNC: 160 MG/DL (ref 70–130)
GLUCOSE SERPL-MCNC: 103 MG/DL (ref 65–99)
HCT VFR BLD AUTO: 25.7 % (ref 37.5–51)
HGB BLD-MCNC: 8.5 G/DL (ref 13–17.7)
LYMPHOCYTES # BLD MANUAL: 0.48 10*3/MM3 (ref 0.7–3.1)
LYMPHOCYTES NFR BLD MANUAL: 9 % (ref 5–12)
MCH RBC QN AUTO: 30.1 PG (ref 26.6–33)
MCHC RBC AUTO-ENTMCNC: 33.1 G/DL (ref 31.5–35.7)
MCV RBC AUTO: 91.1 FL (ref 79–97)
MONOCYTES # BLD: 0.11 10*3/MM3 (ref 0.1–0.9)
NEUTROPHILS # BLD AUTO: 0.6 10*3/MM3 (ref 1.7–7)
NEUTROPHILS NFR BLD MANUAL: 47 % (ref 42.7–76)
PLAT MORPH BLD: NORMAL
PLATELET # BLD AUTO: 59 10*3/MM3 (ref 140–450)
PMV BLD AUTO: 11.8 FL (ref 6–12)
POIKILOCYTOSIS BLD QL SMEAR: ABNORMAL
POTASSIUM SERPL-SCNC: 4.5 MMOL/L (ref 3.5–5.2)
RBC # BLD AUTO: 2.82 10*6/MM3 (ref 4.14–5.8)
SODIUM SERPL-SCNC: 137 MMOL/L (ref 136–145)
VARIANT LYMPHS NFR BLD MANUAL: 38 % (ref 19.6–45.3)
WBC MORPH BLD: NORMAL
WBC NRBC COR # BLD: 1.27 10*3/MM3 (ref 3.4–10.8)

## 2022-01-23 PROCEDURE — 82962 GLUCOSE BLOOD TEST: CPT

## 2022-01-23 PROCEDURE — 80048 BASIC METABOLIC PNL TOTAL CA: CPT | Performed by: INTERNAL MEDICINE

## 2022-01-23 PROCEDURE — 63710000001 INSULIN REGULAR HUMAN PER 5 UNITS: Performed by: INTERNAL MEDICINE

## 2022-01-23 PROCEDURE — 85025 COMPLETE CBC W/AUTO DIFF WBC: CPT | Performed by: INTERNAL MEDICINE

## 2022-01-23 PROCEDURE — 85007 BL SMEAR W/DIFF WBC COUNT: CPT | Performed by: INTERNAL MEDICINE

## 2022-01-23 RX ORDER — AMLODIPINE BESYLATE 10 MG/1
10 TABLET ORAL DAILY
Status: DISCONTINUED | OUTPATIENT
Start: 2022-01-23 | End: 2022-01-24

## 2022-01-23 RX ORDER — GLIPIZIDE 5 MG/1
2.5 TABLET ORAL
Refills: 0 | Status: DISCONTINUED | OUTPATIENT
Start: 2022-01-24 | End: 2022-01-30 | Stop reason: HOSPADM

## 2022-01-23 RX ORDER — LIDOCAINE 50 MG/G
1 PATCH TOPICAL
Status: DISCONTINUED | OUTPATIENT
Start: 2022-01-23 | End: 2022-01-30 | Stop reason: HOSPADM

## 2022-01-23 RX ORDER — MORPHINE SULFATE 15 MG/1
15 TABLET ORAL EVERY 8 HOURS PRN
Status: DISCONTINUED | OUTPATIENT
Start: 2022-01-23 | End: 2022-01-30 | Stop reason: HOSPADM

## 2022-01-23 RX ORDER — POTASSIUM CHLORIDE 750 MG/1
20 TABLET, FILM COATED, EXTENDED RELEASE ORAL DAILY
Status: DISCONTINUED | OUTPATIENT
Start: 2022-01-23 | End: 2022-01-30 | Stop reason: HOSPADM

## 2022-01-23 RX ORDER — ATORVASTATIN CALCIUM 40 MG/1
40 TABLET, FILM COATED ORAL DAILY
Qty: 30 TABLET | Refills: 0 | Status: SHIPPED | OUTPATIENT
Start: 2022-01-24 | End: 2022-02-10 | Stop reason: SDUPTHER

## 2022-01-23 RX ORDER — LIDOCAINE 50 MG/G
1 PATCH TOPICAL
Qty: 1 EACH | Refills: 5 | Status: SHIPPED | OUTPATIENT
Start: 2022-01-23 | End: 2022-02-10 | Stop reason: SDUPTHER

## 2022-01-23 RX ORDER — SPIRONOLACTONE 100 MG/1
100 TABLET, FILM COATED ORAL 2 TIMES DAILY
Status: DISCONTINUED | OUTPATIENT
Start: 2022-01-23 | End: 2022-01-24

## 2022-01-23 RX ADMIN — RIFAXIMIN 550 MG: 550 TABLET ORAL at 09:30

## 2022-01-23 RX ADMIN — SODIUM CHLORIDE, PRESERVATIVE FREE 10 ML: 5 INJECTION INTRAVENOUS at 00:27

## 2022-01-23 RX ADMIN — AMLODIPINE BESYLATE 10 MG: 10 TABLET ORAL at 17:17

## 2022-01-23 RX ADMIN — Medication 400 MG: at 09:30

## 2022-01-23 RX ADMIN — SERTRALINE HYDROCHLORIDE 50 MG: 50 TABLET, FILM COATED ORAL at 09:30

## 2022-01-23 RX ADMIN — SPIRONOLACTONE 100 MG: 100 TABLET, FILM COATED ORAL at 20:49

## 2022-01-23 RX ADMIN — HYDROCODONE BITARTRATE AND ACETAMINOPHEN 1 TABLET: 5; 325 TABLET ORAL at 05:50

## 2022-01-23 RX ADMIN — SODIUM CHLORIDE, PRESERVATIVE FREE 10 ML: 5 INJECTION INTRAVENOUS at 09:30

## 2022-01-23 RX ADMIN — MORPHINE SULFATE 15 MG: 15 TABLET ORAL at 20:48

## 2022-01-23 RX ADMIN — MORPHINE SULFATE 15 MG: 15 TABLET ORAL at 03:25

## 2022-01-23 RX ADMIN — POTASSIUM CHLORIDE 20 MEQ: 10 TABLET, EXTENDED RELEASE ORAL at 17:17

## 2022-01-23 RX ADMIN — BUSPIRONE HYDROCHLORIDE 5 MG: 15 TABLET ORAL at 09:30

## 2022-01-23 RX ADMIN — BUSPIRONE HYDROCHLORIDE 5 MG: 15 TABLET ORAL at 17:17

## 2022-01-23 RX ADMIN — LIDOCAINE 1 PATCH: 50 PATCH TOPICAL at 17:18

## 2022-01-23 RX ADMIN — FUROSEMIDE 120 MG: 80 TABLET ORAL at 20:49

## 2022-01-23 RX ADMIN — MAGNESIUM OXIDE 400 MG (241.3 MG MAGNESIUM) TABLET 400 MG: TABLET at 09:30

## 2022-01-23 RX ADMIN — MORPHINE SULFATE 15 MG: 15 TABLET ORAL at 11:52

## 2022-01-23 RX ADMIN — SODIUM CHLORIDE, PRESERVATIVE FREE 10 ML: 5 INJECTION INTRAVENOUS at 20:49

## 2022-01-23 RX ADMIN — BUSPIRONE HYDROCHLORIDE 5 MG: 15 TABLET ORAL at 20:49

## 2022-01-23 RX ADMIN — RIFAXIMIN 550 MG: 550 TABLET ORAL at 20:49

## 2022-01-23 RX ADMIN — INSULIN HUMAN 2 UNITS: 100 INJECTION, SOLUTION PARENTERAL at 00:25

## 2022-01-23 RX ADMIN — ATORVASTATIN CALCIUM 40 MG: 20 TABLET, FILM COATED ORAL at 09:30

## 2022-01-23 RX ADMIN — PANTOPRAZOLE SODIUM 40 MG: 40 TABLET, DELAYED RELEASE ORAL at 05:50

## 2022-01-24 LAB
ANION GAP SERPL CALCULATED.3IONS-SCNC: 8.4 MMOL/L (ref 5–15)
BUN SERPL-MCNC: 11 MG/DL (ref 6–20)
BUN/CREAT SERPL: 17.7 (ref 7–25)
CALCIUM SPEC-SCNC: 7.9 MG/DL (ref 8.6–10.5)
CHLORIDE SERPL-SCNC: 105 MMOL/L (ref 98–107)
CO2 SERPL-SCNC: 22.6 MMOL/L (ref 22–29)
CREAT SERPL-MCNC: 0.62 MG/DL (ref 0.76–1.27)
DEPRECATED RDW RBC AUTO: 48.2 FL (ref 37–54)
ERYTHROCYTE [DISTWIDTH] IN BLOOD BY AUTOMATED COUNT: 15.2 % (ref 12.3–15.4)
GFR SERPL CREATININE-BSD FRML MDRD: 135 ML/MIN/1.73
GLUCOSE BLDC GLUCOMTR-MCNC: 107 MG/DL (ref 70–130)
GLUCOSE BLDC GLUCOMTR-MCNC: 111 MG/DL (ref 70–130)
GLUCOSE BLDC GLUCOMTR-MCNC: 121 MG/DL (ref 70–130)
GLUCOSE BLDC GLUCOMTR-MCNC: 135 MG/DL (ref 70–130)
GLUCOSE BLDC GLUCOMTR-MCNC: 93 MG/DL (ref 70–130)
GLUCOSE BLDC GLUCOMTR-MCNC: 99 MG/DL (ref 70–130)
GLUCOSE SERPL-MCNC: 108 MG/DL (ref 65–99)
HCT VFR BLD AUTO: 26.2 % (ref 37.5–51)
HGB BLD-MCNC: 8.9 G/DL (ref 13–17.7)
MCH RBC QN AUTO: 30.2 PG (ref 26.6–33)
MCHC RBC AUTO-ENTMCNC: 34 G/DL (ref 31.5–35.7)
MCV RBC AUTO: 88.8 FL (ref 79–97)
NRBC BLD AUTO-RTO: 0.9 /100 WBC (ref 0–0.2)
PLATELET # BLD AUTO: 60 10*3/MM3 (ref 140–450)
PMV BLD AUTO: 10.6 FL (ref 6–12)
POTASSIUM SERPL-SCNC: 3.7 MMOL/L (ref 3.5–5.2)
RBC # BLD AUTO: 2.95 10*6/MM3 (ref 4.14–5.8)
SODIUM SERPL-SCNC: 136 MMOL/L (ref 136–145)
WBC NRBC COR # BLD: 1.07 10*3/MM3 (ref 3.4–10.8)

## 2022-01-24 PROCEDURE — 99255 IP/OBS CONSLTJ NEW/EST HI 80: CPT | Performed by: INTERNAL MEDICINE

## 2022-01-24 PROCEDURE — 97116 GAIT TRAINING THERAPY: CPT

## 2022-01-24 PROCEDURE — 63710000001 ONDANSETRON ODT 4 MG TABLET DISPERSIBLE: Performed by: INTERNAL MEDICINE

## 2022-01-24 PROCEDURE — 80048 BASIC METABOLIC PNL TOTAL CA: CPT | Performed by: INTERNAL MEDICINE

## 2022-01-24 PROCEDURE — 97530 THERAPEUTIC ACTIVITIES: CPT

## 2022-01-24 PROCEDURE — 82962 GLUCOSE BLOOD TEST: CPT

## 2022-01-24 PROCEDURE — 85025 COMPLETE CBC W/AUTO DIFF WBC: CPT | Performed by: INTERNAL MEDICINE

## 2022-01-24 PROCEDURE — 97110 THERAPEUTIC EXERCISES: CPT

## 2022-01-24 RX ORDER — AMLODIPINE BESYLATE 10 MG/1
10 TABLET ORAL DAILY
Status: DISCONTINUED | OUTPATIENT
Start: 2022-01-24 | End: 2022-01-30 | Stop reason: HOSPADM

## 2022-01-24 RX ORDER — SPIRONOLACTONE 100 MG/1
100 TABLET, FILM COATED ORAL 2 TIMES DAILY
Status: DISCONTINUED | OUTPATIENT
Start: 2022-01-24 | End: 2022-01-30 | Stop reason: HOSPADM

## 2022-01-24 RX ADMIN — POTASSIUM CHLORIDE 20 MEQ: 10 TABLET, EXTENDED RELEASE ORAL at 08:21

## 2022-01-24 RX ADMIN — GLIPIZIDE 2.5 MG: 5 TABLET ORAL at 08:23

## 2022-01-24 RX ADMIN — RIFAXIMIN 550 MG: 550 TABLET ORAL at 08:21

## 2022-01-24 RX ADMIN — FUROSEMIDE 120 MG: 80 TABLET ORAL at 08:21

## 2022-01-24 RX ADMIN — MORPHINE SULFATE 15 MG: 15 TABLET ORAL at 05:11

## 2022-01-24 RX ADMIN — BUSPIRONE HYDROCHLORIDE 5 MG: 15 TABLET ORAL at 08:24

## 2022-01-24 RX ADMIN — Medication 400 MG: at 08:23

## 2022-01-24 RX ADMIN — AMLODIPINE BESYLATE 10 MG: 10 TABLET ORAL at 08:24

## 2022-01-24 RX ADMIN — LACTULOSE 20 G: 20 SOLUTION ORAL at 08:25

## 2022-01-24 RX ADMIN — PANTOPRAZOLE SODIUM 40 MG: 40 TABLET, DELAYED RELEASE ORAL at 05:11

## 2022-01-24 RX ADMIN — MORPHINE SULFATE 15 MG: 15 TABLET ORAL at 23:00

## 2022-01-24 RX ADMIN — BUSPIRONE HYDROCHLORIDE 5 MG: 15 TABLET ORAL at 22:56

## 2022-01-24 RX ADMIN — AMLODIPINE BESYLATE 10 MG: 10 TABLET ORAL at 18:26

## 2022-01-24 RX ADMIN — ONDANSETRON 4 MG: 4 TABLET, ORALLY DISINTEGRATING ORAL at 15:40

## 2022-01-24 RX ADMIN — MAGNESIUM OXIDE 400 MG (241.3 MG MAGNESIUM) TABLET 400 MG: TABLET at 08:21

## 2022-01-24 RX ADMIN — SERTRALINE HYDROCHLORIDE 50 MG: 50 TABLET, FILM COATED ORAL at 08:24

## 2022-01-24 RX ADMIN — RIFAXIMIN 550 MG: 550 TABLET ORAL at 22:56

## 2022-01-24 RX ADMIN — SPIRONOLACTONE 100 MG: 100 TABLET, FILM COATED ORAL at 22:59

## 2022-01-24 RX ADMIN — LIDOCAINE 1 PATCH: 50 PATCH TOPICAL at 08:24

## 2022-01-24 RX ADMIN — MORPHINE SULFATE 15 MG: 15 TABLET ORAL at 13:12

## 2022-01-24 RX ADMIN — ATORVASTATIN CALCIUM 40 MG: 20 TABLET, FILM COATED ORAL at 08:21

## 2022-01-24 RX ADMIN — SPIRONOLACTONE 100 MG: 100 TABLET, FILM COATED ORAL at 08:24

## 2022-01-25 ENCOUNTER — APPOINTMENT (OUTPATIENT)
Dept: CARDIOLOGY | Facility: HOSPITAL | Age: 57
End: 2022-01-25

## 2022-01-25 ENCOUNTER — APPOINTMENT (OUTPATIENT)
Dept: DIABETES SERVICES | Facility: HOSPITAL | Age: 57
End: 2022-01-25

## 2022-01-25 ENCOUNTER — APPOINTMENT (OUTPATIENT)
Dept: CT IMAGING | Facility: HOSPITAL | Age: 57
End: 2022-01-25

## 2022-01-25 ENCOUNTER — HOME HEALTH ADMISSION (OUTPATIENT)
Dept: HOME HEALTH SERVICES | Facility: HOME HEALTHCARE | Age: 57
End: 2022-01-25

## 2022-01-25 LAB
ALBUMIN SERPL-MCNC: 2.5 G/DL (ref 3.5–5.2)
ALBUMIN/GLOB SERPL: 0.8 G/DL
ALP SERPL-CCNC: 104 U/L (ref 39–117)
ALT SERPL W P-5'-P-CCNC: 24 U/L (ref 1–41)
ANION GAP SERPL CALCULATED.3IONS-SCNC: 8 MMOL/L (ref 5–15)
AST SERPL-CCNC: 69 U/L (ref 1–40)
BASOPHILS # BLD AUTO: 0.01 10*3/MM3 (ref 0–0.2)
BASOPHILS NFR BLD AUTO: 0.8 % (ref 0–1.5)
BH CV UPPER VENOUS LEFT AXILLARY AUGMENT: NORMAL
BH CV UPPER VENOUS LEFT AXILLARY COMPETENT: NORMAL
BH CV UPPER VENOUS LEFT AXILLARY COMPRESS: NORMAL
BH CV UPPER VENOUS LEFT AXILLARY PHASIC: NORMAL
BH CV UPPER VENOUS LEFT AXILLARY SPONT: NORMAL
BH CV UPPER VENOUS LEFT BASILIC FOREARM COMPRESS: NORMAL
BH CV UPPER VENOUS LEFT BASILIC UPPER COMPRESS: NORMAL
BH CV UPPER VENOUS LEFT BRACHIAL COMPRESS: NORMAL
BH CV UPPER VENOUS LEFT CEPHALIC FOREARM COMPRESS: NORMAL
BH CV UPPER VENOUS LEFT CEPHALIC UPPER COMPRESS: NORMAL
BH CV UPPER VENOUS LEFT INTERNAL JUGULAR AUGMENT: NORMAL
BH CV UPPER VENOUS LEFT INTERNAL JUGULAR COMPETENT: NORMAL
BH CV UPPER VENOUS LEFT INTERNAL JUGULAR COMPRESS: NORMAL
BH CV UPPER VENOUS LEFT INTERNAL JUGULAR PHASIC: NORMAL
BH CV UPPER VENOUS LEFT INTERNAL JUGULAR SPONT: NORMAL
BH CV UPPER VENOUS LEFT RADIAL COMPRESS: NORMAL
BH CV UPPER VENOUS LEFT SUBCLAVIAN AUGMENT: NORMAL
BH CV UPPER VENOUS LEFT SUBCLAVIAN COMPETENT: NORMAL
BH CV UPPER VENOUS LEFT SUBCLAVIAN COMPRESS: NORMAL
BH CV UPPER VENOUS LEFT SUBCLAVIAN PHASIC: NORMAL
BH CV UPPER VENOUS LEFT SUBCLAVIAN SPONT: NORMAL
BH CV UPPER VENOUS LEFT ULNAR COMPRESS: NORMAL
BH CV UPPER VENOUS RIGHT INTERNAL JUGULAR AUGMENT: NORMAL
BH CV UPPER VENOUS RIGHT INTERNAL JUGULAR COMPETENT: NORMAL
BH CV UPPER VENOUS RIGHT INTERNAL JUGULAR COMPRESS: NORMAL
BH CV UPPER VENOUS RIGHT INTERNAL JUGULAR PHASIC: NORMAL
BH CV UPPER VENOUS RIGHT INTERNAL JUGULAR SPONT: NORMAL
BH CV UPPER VENOUS RIGHT SUBCLAVIAN AUGMENT: NORMAL
BH CV UPPER VENOUS RIGHT SUBCLAVIAN COMPETENT: NORMAL
BH CV UPPER VENOUS RIGHT SUBCLAVIAN COMPRESS: NORMAL
BH CV UPPER VENOUS RIGHT SUBCLAVIAN PHASIC: NORMAL
BH CV UPPER VENOUS RIGHT SUBCLAVIAN SPONT: NORMAL
BILIRUB SERPL-MCNC: 1 MG/DL (ref 0–1.2)
BUN SERPL-MCNC: 13 MG/DL (ref 6–20)
BUN/CREAT SERPL: 16.7 (ref 7–25)
CALCIUM SPEC-SCNC: 7.9 MG/DL (ref 8.6–10.5)
CHLORIDE SERPL-SCNC: 108 MMOL/L (ref 98–107)
CO2 SERPL-SCNC: 24 MMOL/L (ref 22–29)
CREAT SERPL-MCNC: 0.78 MG/DL (ref 0.76–1.27)
CRP SERPL-MCNC: 1.04 MG/DL (ref 0–0.5)
DEPRECATED RDW RBC AUTO: 45.7 FL (ref 37–54)
EOSINOPHIL # BLD AUTO: 0.04 10*3/MM3 (ref 0–0.4)
EOSINOPHIL NFR BLD AUTO: 3.3 % (ref 0.3–6.2)
ERYTHROCYTE [DISTWIDTH] IN BLOOD BY AUTOMATED COUNT: 14.3 % (ref 12.3–15.4)
ERYTHROCYTE [SEDIMENTATION RATE] IN BLOOD: 21 MM/HR (ref 0–20)
FERRITIN SERPL-MCNC: 68.8 NG/ML (ref 30–400)
FOLATE SERPL-MCNC: 8.75 NG/ML (ref 4.78–24.2)
GFR SERPL CREATININE-BSD FRML MDRD: 103 ML/MIN/1.73
GLOBULIN UR ELPH-MCNC: 3 GM/DL
GLUCOSE BLDC GLUCOMTR-MCNC: 101 MG/DL (ref 70–130)
GLUCOSE BLDC GLUCOMTR-MCNC: 84 MG/DL (ref 70–130)
GLUCOSE BLDC GLUCOMTR-MCNC: 88 MG/DL (ref 70–130)
GLUCOSE BLDC GLUCOMTR-MCNC: 92 MG/DL (ref 70–130)
GLUCOSE SERPL-MCNC: 153 MG/DL (ref 65–99)
HCT VFR BLD AUTO: 25.7 % (ref 37.5–51)
HGB BLD-MCNC: 8.7 G/DL (ref 13–17.7)
IMM GRANULOCYTES # BLD AUTO: 0 10*3/MM3 (ref 0–0.05)
IMM GRANULOCYTES NFR BLD AUTO: 0 % (ref 0–0.5)
IRON 24H UR-MRATE: 31 MCG/DL (ref 59–158)
IRON SATN MFR SERPL: 10 % (ref 20–50)
LYMPHOCYTES # BLD AUTO: 0.48 10*3/MM3 (ref 0.7–3.1)
LYMPHOCYTES NFR BLD AUTO: 40 % (ref 19.6–45.3)
MAXIMAL PREDICTED HEART RATE: 165 BPM
MCH RBC QN AUTO: 29.6 PG (ref 26.6–33)
MCHC RBC AUTO-ENTMCNC: 33.9 G/DL (ref 31.5–35.7)
MCV RBC AUTO: 87.4 FL (ref 79–97)
MONOCYTES # BLD AUTO: 0.16 10*3/MM3 (ref 0.1–0.9)
MONOCYTES NFR BLD AUTO: 13.3 % (ref 5–12)
NEUTROPHILS NFR BLD AUTO: 0.51 10*3/MM3 (ref 1.7–7)
NEUTROPHILS NFR BLD AUTO: 42.6 % (ref 42.7–76)
NRBC BLD AUTO-RTO: 0 /100 WBC (ref 0–0.2)
PLATELET # BLD AUTO: 50 10*3/MM3 (ref 140–450)
PMV BLD AUTO: 10.1 FL (ref 6–12)
POTASSIUM SERPL-SCNC: 3.7 MMOL/L (ref 3.5–5.2)
PROT SERPL-MCNC: 5.5 G/DL (ref 6–8.5)
RBC # BLD AUTO: 2.94 10*6/MM3 (ref 4.14–5.8)
SODIUM SERPL-SCNC: 140 MMOL/L (ref 136–145)
STRESS TARGET HR: 140 BPM
TIBC SERPL-MCNC: 308 MCG/DL (ref 298–536)
TRANSFERRIN SERPL-MCNC: 207 MG/DL (ref 200–360)
VIT B12 BLD-MCNC: 927 PG/ML (ref 211–946)
WBC NRBC COR # BLD: 1.2 10*3/MM3 (ref 3.4–10.8)

## 2022-01-25 PROCEDURE — 0 DIATRIZOATE MEGLUMINE & SODIUM PER 1 ML: Performed by: INTERNAL MEDICINE

## 2022-01-25 PROCEDURE — 82607 VITAMIN B-12: CPT | Performed by: INTERNAL MEDICINE

## 2022-01-25 PROCEDURE — 82728 ASSAY OF FERRITIN: CPT | Performed by: INTERNAL MEDICINE

## 2022-01-25 PROCEDURE — 86140 C-REACTIVE PROTEIN: CPT | Performed by: INTERNAL MEDICINE

## 2022-01-25 PROCEDURE — 85652 RBC SED RATE AUTOMATED: CPT | Performed by: INTERNAL MEDICINE

## 2022-01-25 PROCEDURE — 85025 COMPLETE CBC W/AUTO DIFF WBC: CPT | Performed by: INTERNAL MEDICINE

## 2022-01-25 PROCEDURE — 63710000001 DIPHENHYDRAMINE PER 50 MG: Performed by: INTERNAL MEDICINE

## 2022-01-25 PROCEDURE — 25010000002 NA FERRIC GLUC CPLX PER 12.5 MG: Performed by: INTERNAL MEDICINE

## 2022-01-25 PROCEDURE — 82962 GLUCOSE BLOOD TEST: CPT

## 2022-01-25 PROCEDURE — 80053 COMPREHEN METABOLIC PANEL: CPT | Performed by: INTERNAL MEDICINE

## 2022-01-25 PROCEDURE — 99232 SBSQ HOSP IP/OBS MODERATE 35: CPT | Performed by: INTERNAL MEDICINE

## 2022-01-25 PROCEDURE — 83540 ASSAY OF IRON: CPT | Performed by: INTERNAL MEDICINE

## 2022-01-25 PROCEDURE — 63710000001 ONDANSETRON ODT 4 MG TABLET DISPERSIBLE: Performed by: INTERNAL MEDICINE

## 2022-01-25 PROCEDURE — 88185 FLOWCYTOMETRY/TC ADD-ON: CPT | Performed by: INTERNAL MEDICINE

## 2022-01-25 PROCEDURE — 88184 FLOWCYTOMETRY/ TC 1 MARKER: CPT | Performed by: INTERNAL MEDICINE

## 2022-01-25 PROCEDURE — 88182 CELL MARKER STUDY: CPT | Performed by: INTERNAL MEDICINE

## 2022-01-25 PROCEDURE — 93971 EXTREMITY STUDY: CPT

## 2022-01-25 PROCEDURE — 25010000002 IOPAMIDOL 61 % SOLUTION: Performed by: INTERNAL MEDICINE

## 2022-01-25 PROCEDURE — 74177 CT ABD & PELVIS W/CONTRAST: CPT

## 2022-01-25 PROCEDURE — 82746 ASSAY OF FOLIC ACID SERUM: CPT | Performed by: INTERNAL MEDICINE

## 2022-01-25 PROCEDURE — 84466 ASSAY OF TRANSFERRIN: CPT | Performed by: INTERNAL MEDICINE

## 2022-01-25 RX ORDER — FAMOTIDINE 20 MG/1
20 TABLET, FILM COATED ORAL DAILY
Status: COMPLETED | OUTPATIENT
Start: 2022-01-25 | End: 2022-01-28

## 2022-01-25 RX ORDER — DIPHENHYDRAMINE HCL 25 MG
25 CAPSULE ORAL DAILY
Status: COMPLETED | OUTPATIENT
Start: 2022-01-25 | End: 2022-01-28

## 2022-01-25 RX ADMIN — MORPHINE SULFATE 15 MG: 15 TABLET ORAL at 21:55

## 2022-01-25 RX ADMIN — MORPHINE SULFATE 15 MG: 15 TABLET ORAL at 06:35

## 2022-01-25 RX ADMIN — FUROSEMIDE 120 MG: 80 TABLET ORAL at 08:53

## 2022-01-25 RX ADMIN — MORPHINE SULFATE 15 MG: 15 TABLET ORAL at 17:47

## 2022-01-25 RX ADMIN — BUSPIRONE HYDROCHLORIDE 5 MG: 15 TABLET ORAL at 08:55

## 2022-01-25 RX ADMIN — ONDANSETRON 4 MG: 4 TABLET, ORALLY DISINTEGRATING ORAL at 17:49

## 2022-01-25 RX ADMIN — SPIRONOLACTONE 100 MG: 100 TABLET, FILM COATED ORAL at 08:53

## 2022-01-25 RX ADMIN — DIPHENHYDRAMINE HYDROCHLORIDE 25 MG: 25 CAPSULE ORAL at 21:41

## 2022-01-25 RX ADMIN — MAGNESIUM OXIDE 400 MG (241.3 MG MAGNESIUM) TABLET 400 MG: TABLET at 08:54

## 2022-01-25 RX ADMIN — PANTOPRAZOLE SODIUM 40 MG: 40 TABLET, DELAYED RELEASE ORAL at 06:35

## 2022-01-25 RX ADMIN — BUSPIRONE HYDROCHLORIDE 5 MG: 15 TABLET ORAL at 21:42

## 2022-01-25 RX ADMIN — DIATRIZOATE MEGLUMINE AND DIATRIZOATE SODIUM 30 ML: 600; 100 SOLUTION ORAL; RECTAL at 12:48

## 2022-01-25 RX ADMIN — Medication 400 MG: at 08:55

## 2022-01-25 RX ADMIN — SODIUM CHLORIDE 250 MG: 9 INJECTION, SOLUTION INTRAVENOUS at 21:43

## 2022-01-25 RX ADMIN — BUSPIRONE HYDROCHLORIDE 5 MG: 15 TABLET ORAL at 17:44

## 2022-01-25 RX ADMIN — SERTRALINE HYDROCHLORIDE 50 MG: 50 TABLET, FILM COATED ORAL at 08:54

## 2022-01-25 RX ADMIN — LIDOCAINE 1 PATCH: 50 PATCH TOPICAL at 08:55

## 2022-01-25 RX ADMIN — ATORVASTATIN CALCIUM 40 MG: 20 TABLET, FILM COATED ORAL at 12:52

## 2022-01-25 RX ADMIN — POTASSIUM CHLORIDE 20 MEQ: 10 TABLET, EXTENDED RELEASE ORAL at 09:09

## 2022-01-25 RX ADMIN — FAMOTIDINE 20 MG: 20 TABLET, FILM COATED ORAL at 21:42

## 2022-01-25 RX ADMIN — AMLODIPINE BESYLATE 10 MG: 10 TABLET ORAL at 08:54

## 2022-01-25 RX ADMIN — IOPAMIDOL 85 ML: 612 INJECTION, SOLUTION INTRAVENOUS at 15:17

## 2022-01-25 RX ADMIN — SPIRONOLACTONE 100 MG: 100 TABLET, FILM COATED ORAL at 21:44

## 2022-01-25 RX ADMIN — RIFAXIMIN 550 MG: 550 TABLET ORAL at 08:55

## 2022-01-25 RX ADMIN — GLIPIZIDE 2.5 MG: 5 TABLET ORAL at 08:54

## 2022-01-26 LAB
ALBUMIN SERPL-MCNC: 2.7 G/DL (ref 3.5–5.2)
ALBUMIN/GLOB SERPL: 0.8 G/DL
ALP SERPL-CCNC: 114 U/L (ref 39–117)
ALT SERPL W P-5'-P-CCNC: 26 U/L (ref 1–41)
ANION GAP SERPL CALCULATED.3IONS-SCNC: 8 MMOL/L (ref 5–15)
AST SERPL-CCNC: 76 U/L (ref 1–40)
BILIRUB SERPL-MCNC: 1.1 MG/DL (ref 0–1.2)
BUN SERPL-MCNC: 12 MG/DL (ref 6–20)
BUN/CREAT SERPL: 15.2 (ref 7–25)
CALCIUM SPEC-SCNC: 8.1 MG/DL (ref 8.6–10.5)
CHLORIDE SERPL-SCNC: 105 MMOL/L (ref 98–107)
CO2 SERPL-SCNC: 26 MMOL/L (ref 22–29)
CREAT SERPL-MCNC: 0.79 MG/DL (ref 0.76–1.27)
DEPRECATED RDW RBC AUTO: 47.4 FL (ref 37–54)
EOSINOPHIL # BLD MANUAL: 0.02 10*3/MM3 (ref 0–0.4)
EOSINOPHIL NFR BLD MANUAL: 1 % (ref 0.3–6.2)
ERYTHROCYTE [DISTWIDTH] IN BLOOD BY AUTOMATED COUNT: 14.6 % (ref 12.3–15.4)
GFR SERPL CREATININE-BSD FRML MDRD: 102 ML/MIN/1.73
GLOBULIN UR ELPH-MCNC: 3.2 GM/DL
GLUCOSE BLDC GLUCOMTR-MCNC: 117 MG/DL (ref 70–130)
GLUCOSE BLDC GLUCOMTR-MCNC: 125 MG/DL (ref 70–130)
GLUCOSE BLDC GLUCOMTR-MCNC: 92 MG/DL (ref 70–130)
GLUCOSE BLDC GLUCOMTR-MCNC: 95 MG/DL (ref 70–130)
GLUCOSE SERPL-MCNC: 111 MG/DL (ref 65–99)
HCT VFR BLD AUTO: 29.1 % (ref 37.5–51)
HGB BLD-MCNC: 9.8 G/DL (ref 13–17.7)
LYMPHOCYTES # BLD MANUAL: 0.41 10*3/MM3 (ref 0.7–3.1)
LYMPHOCYTES NFR BLD MANUAL: 4.1 % (ref 5–12)
MCH RBC QN AUTO: 29.8 PG (ref 26.6–33)
MCHC RBC AUTO-ENTMCNC: 33.7 G/DL (ref 31.5–35.7)
MCV RBC AUTO: 88.4 FL (ref 79–97)
MONOCYTES # BLD: 0.07 10*3/MM3 (ref 0.1–0.9)
NEUTROPHILS # BLD AUTO: 1.22 10*3/MM3 (ref 1.7–7)
NEUTROPHILS NFR BLD MANUAL: 71.1 % (ref 42.7–76)
OVALOCYTES BLD QL SMEAR: ABNORMAL
PLAT MORPH BLD: NORMAL
PLATELET # BLD AUTO: 68 10*3/MM3 (ref 140–450)
PMV BLD AUTO: 12.2 FL (ref 6–12)
POIKILOCYTOSIS BLD QL SMEAR: ABNORMAL
POTASSIUM SERPL-SCNC: 4 MMOL/L (ref 3.5–5.2)
PROT SERPL-MCNC: 5.9 G/DL (ref 6–8.5)
RBC # BLD AUTO: 3.29 10*6/MM3 (ref 4.14–5.8)
SODIUM SERPL-SCNC: 139 MMOL/L (ref 136–145)
VARIANT LYMPHS NFR BLD MANUAL: 23.7 % (ref 19.6–45.3)
WBC MORPH BLD: NORMAL
WBC NRBC COR # BLD: 1.72 10*3/MM3 (ref 3.4–10.8)

## 2022-01-26 PROCEDURE — 99232 SBSQ HOSP IP/OBS MODERATE 35: CPT | Performed by: INTERNAL MEDICINE

## 2022-01-26 PROCEDURE — 85007 BL SMEAR W/DIFF WBC COUNT: CPT | Performed by: INTERNAL MEDICINE

## 2022-01-26 PROCEDURE — 25010000002 NA FERRIC GLUC CPLX PER 12.5 MG: Performed by: INTERNAL MEDICINE

## 2022-01-26 PROCEDURE — 85025 COMPLETE CBC W/AUTO DIFF WBC: CPT | Performed by: INTERNAL MEDICINE

## 2022-01-26 PROCEDURE — 97530 THERAPEUTIC ACTIVITIES: CPT

## 2022-01-26 PROCEDURE — 63710000001 DIPHENHYDRAMINE PER 50 MG: Performed by: INTERNAL MEDICINE

## 2022-01-26 PROCEDURE — 80053 COMPREHEN METABOLIC PANEL: CPT | Performed by: INTERNAL MEDICINE

## 2022-01-26 PROCEDURE — 82962 GLUCOSE BLOOD TEST: CPT

## 2022-01-26 RX ADMIN — RIFAXIMIN 550 MG: 550 TABLET ORAL at 08:55

## 2022-01-26 RX ADMIN — PANTOPRAZOLE SODIUM 40 MG: 40 TABLET, DELAYED RELEASE ORAL at 08:54

## 2022-01-26 RX ADMIN — Medication 400 MG: at 08:54

## 2022-01-26 RX ADMIN — POTASSIUM CHLORIDE 20 MEQ: 10 TABLET, EXTENDED RELEASE ORAL at 08:54

## 2022-01-26 RX ADMIN — DIPHENHYDRAMINE HYDROCHLORIDE 25 MG: 25 CAPSULE ORAL at 17:04

## 2022-01-26 RX ADMIN — SPIRONOLACTONE 100 MG: 100 TABLET, FILM COATED ORAL at 20:19

## 2022-01-26 RX ADMIN — BUSPIRONE HYDROCHLORIDE 5 MG: 15 TABLET ORAL at 08:55

## 2022-01-26 RX ADMIN — LIDOCAINE 1 PATCH: 50 PATCH TOPICAL at 08:55

## 2022-01-26 RX ADMIN — RIFAXIMIN 550 MG: 550 TABLET ORAL at 20:18

## 2022-01-26 RX ADMIN — MAGNESIUM OXIDE 400 MG (241.3 MG MAGNESIUM) TABLET 400 MG: TABLET at 08:54

## 2022-01-26 RX ADMIN — BUSPIRONE HYDROCHLORIDE 5 MG: 15 TABLET ORAL at 20:18

## 2022-01-26 RX ADMIN — SERTRALINE HYDROCHLORIDE 50 MG: 50 TABLET, FILM COATED ORAL at 08:55

## 2022-01-26 RX ADMIN — SODIUM CHLORIDE 250 MG: 9 INJECTION, SOLUTION INTRAVENOUS at 17:04

## 2022-01-26 RX ADMIN — AMLODIPINE BESYLATE 10 MG: 10 TABLET ORAL at 08:55

## 2022-01-26 RX ADMIN — MORPHINE SULFATE 15 MG: 15 TABLET ORAL at 08:55

## 2022-01-26 RX ADMIN — FAMOTIDINE 20 MG: 20 TABLET, FILM COATED ORAL at 17:04

## 2022-01-26 RX ADMIN — FUROSEMIDE 120 MG: 80 TABLET ORAL at 08:55

## 2022-01-26 RX ADMIN — SPIRONOLACTONE 100 MG: 100 TABLET, FILM COATED ORAL at 08:54

## 2022-01-26 RX ADMIN — ATORVASTATIN CALCIUM 40 MG: 20 TABLET, FILM COATED ORAL at 17:04

## 2022-01-26 RX ADMIN — MORPHINE SULFATE 15 MG: 15 TABLET ORAL at 20:19

## 2022-01-26 RX ADMIN — GLIPIZIDE 2.5 MG: 5 TABLET ORAL at 08:55

## 2022-01-27 LAB
ALBUMIN SERPL-MCNC: 2.5 G/DL (ref 3.5–5.2)
ALBUMIN/GLOB SERPL: 0.8 G/DL
ALP SERPL-CCNC: 120 U/L (ref 39–117)
ALT SERPL W P-5'-P-CCNC: 28 U/L (ref 1–41)
ANION GAP SERPL CALCULATED.3IONS-SCNC: 6.5 MMOL/L (ref 5–15)
AST SERPL-CCNC: 70 U/L (ref 1–40)
BASOPHILS # BLD AUTO: 0.01 10*3/MM3 (ref 0–0.2)
BASOPHILS NFR BLD AUTO: 0.6 % (ref 0–1.5)
BILIRUB SERPL-MCNC: 1.2 MG/DL (ref 0–1.2)
BUN SERPL-MCNC: 12 MG/DL (ref 6–20)
BUN/CREAT SERPL: 19.7 (ref 7–25)
CALCIUM SPEC-SCNC: 8.5 MG/DL (ref 8.6–10.5)
CHLORIDE SERPL-SCNC: 106 MMOL/L (ref 98–107)
CO2 SERPL-SCNC: 26.5 MMOL/L (ref 22–29)
CREAT SERPL-MCNC: 0.61 MG/DL (ref 0.76–1.27)
DEPRECATED RDW RBC AUTO: 48 FL (ref 37–54)
EOSINOPHIL # BLD AUTO: 0.06 10*3/MM3 (ref 0–0.4)
EOSINOPHIL NFR BLD AUTO: 3.7 % (ref 0.3–6.2)
ERYTHROCYTE [DISTWIDTH] IN BLOOD BY AUTOMATED COUNT: 15.1 % (ref 12.3–15.4)
GFR SERPL CREATININE-BSD FRML MDRD: 137 ML/MIN/1.73
GLOBULIN UR ELPH-MCNC: 3.2 GM/DL
GLUCOSE BLDC GLUCOMTR-MCNC: 85 MG/DL (ref 70–130)
GLUCOSE BLDC GLUCOMTR-MCNC: 86 MG/DL (ref 70–130)
GLUCOSE BLDC GLUCOMTR-MCNC: 95 MG/DL (ref 70–130)
GLUCOSE BLDC GLUCOMTR-MCNC: 97 MG/DL (ref 70–130)
GLUCOSE SERPL-MCNC: 87 MG/DL (ref 65–99)
HCT VFR BLD AUTO: 28.9 % (ref 37.5–51)
HGB BLD-MCNC: 9.9 G/DL (ref 13–17.7)
IMM GRANULOCYTES # BLD AUTO: 0.01 10*3/MM3 (ref 0–0.05)
IMM GRANULOCYTES NFR BLD AUTO: 0.6 % (ref 0–0.5)
LYMPHOCYTES # BLD AUTO: 0.64 10*3/MM3 (ref 0.7–3.1)
LYMPHOCYTES NFR BLD AUTO: 39.5 % (ref 19.6–45.3)
MCH RBC QN AUTO: 30.2 PG (ref 26.6–33)
MCHC RBC AUTO-ENTMCNC: 34.3 G/DL (ref 31.5–35.7)
MCV RBC AUTO: 88.1 FL (ref 79–97)
MONOCYTES # BLD AUTO: 0.21 10*3/MM3 (ref 0.1–0.9)
MONOCYTES NFR BLD AUTO: 13 % (ref 5–12)
NEUTROPHILS NFR BLD AUTO: 0.69 10*3/MM3 (ref 1.7–7)
NEUTROPHILS NFR BLD AUTO: 42.6 % (ref 42.7–76)
NRBC BLD AUTO-RTO: 0 /100 WBC (ref 0–0.2)
PLATELET # BLD AUTO: 63 10*3/MM3 (ref 140–450)
PMV BLD AUTO: 11.9 FL (ref 6–12)
POTASSIUM SERPL-SCNC: 3.8 MMOL/L (ref 3.5–5.2)
PROT SERPL-MCNC: 5.7 G/DL (ref 6–8.5)
RBC # BLD AUTO: 3.28 10*6/MM3 (ref 4.14–5.8)
SODIUM SERPL-SCNC: 139 MMOL/L (ref 136–145)
WBC NRBC COR # BLD: 1.62 10*3/MM3 (ref 3.4–10.8)

## 2022-01-27 PROCEDURE — 25010000002 NA FERRIC GLUC CPLX PER 12.5 MG: Performed by: INTERNAL MEDICINE

## 2022-01-27 PROCEDURE — 80053 COMPREHEN METABOLIC PANEL: CPT | Performed by: INTERNAL MEDICINE

## 2022-01-27 PROCEDURE — 82962 GLUCOSE BLOOD TEST: CPT

## 2022-01-27 PROCEDURE — 63710000001 ONDANSETRON ODT 4 MG TABLET DISPERSIBLE: Performed by: INTERNAL MEDICINE

## 2022-01-27 PROCEDURE — 97530 THERAPEUTIC ACTIVITIES: CPT

## 2022-01-27 PROCEDURE — 63710000001 DIPHENHYDRAMINE PER 50 MG: Performed by: INTERNAL MEDICINE

## 2022-01-27 PROCEDURE — 99232 SBSQ HOSP IP/OBS MODERATE 35: CPT | Performed by: INTERNAL MEDICINE

## 2022-01-27 PROCEDURE — 85025 COMPLETE CBC W/AUTO DIFF WBC: CPT | Performed by: INTERNAL MEDICINE

## 2022-01-27 RX ADMIN — MAGNESIUM OXIDE 400 MG (241.3 MG MAGNESIUM) TABLET 400 MG: TABLET at 08:54

## 2022-01-27 RX ADMIN — ATORVASTATIN CALCIUM 40 MG: 20 TABLET, FILM COATED ORAL at 08:54

## 2022-01-27 RX ADMIN — POTASSIUM CHLORIDE 20 MEQ: 10 TABLET, EXTENDED RELEASE ORAL at 08:55

## 2022-01-27 RX ADMIN — SPIRONOLACTONE 100 MG: 100 TABLET, FILM COATED ORAL at 08:54

## 2022-01-27 RX ADMIN — ONDANSETRON 4 MG: 4 TABLET, ORALLY DISINTEGRATING ORAL at 12:11

## 2022-01-27 RX ADMIN — BUSPIRONE HYDROCHLORIDE 5 MG: 15 TABLET ORAL at 08:54

## 2022-01-27 RX ADMIN — PANTOPRAZOLE SODIUM 40 MG: 40 TABLET, DELAYED RELEASE ORAL at 08:55

## 2022-01-27 RX ADMIN — RIFAXIMIN 550 MG: 550 TABLET ORAL at 08:54

## 2022-01-27 RX ADMIN — MORPHINE SULFATE 15 MG: 15 TABLET ORAL at 04:26

## 2022-01-27 RX ADMIN — DIPHENHYDRAMINE HYDROCHLORIDE 25 MG: 25 CAPSULE ORAL at 17:41

## 2022-01-27 RX ADMIN — AMLODIPINE BESYLATE 10 MG: 10 TABLET ORAL at 08:54

## 2022-01-27 RX ADMIN — SERTRALINE HYDROCHLORIDE 50 MG: 50 TABLET, FILM COATED ORAL at 08:54

## 2022-01-27 RX ADMIN — MORPHINE SULFATE 15 MG: 15 TABLET ORAL at 12:09

## 2022-01-27 RX ADMIN — FUROSEMIDE 120 MG: 80 TABLET ORAL at 08:55

## 2022-01-27 RX ADMIN — Medication 400 MG: at 08:55

## 2022-01-27 RX ADMIN — FAMOTIDINE 20 MG: 20 TABLET, FILM COATED ORAL at 17:40

## 2022-01-27 RX ADMIN — LIDOCAINE 1 PATCH: 50 PATCH TOPICAL at 08:56

## 2022-01-27 RX ADMIN — BUSPIRONE HYDROCHLORIDE 5 MG: 15 TABLET ORAL at 17:41

## 2022-01-27 RX ADMIN — GLIPIZIDE 2.5 MG: 5 TABLET ORAL at 08:54

## 2022-01-27 RX ADMIN — SODIUM CHLORIDE 250 MG: 9 INJECTION, SOLUTION INTRAVENOUS at 17:41

## 2022-01-27 RX ADMIN — RIFAXIMIN 550 MG: 550 TABLET ORAL at 20:42

## 2022-01-27 RX ADMIN — MORPHINE SULFATE 15 MG: 15 TABLET ORAL at 20:41

## 2022-01-27 RX ADMIN — SPIRONOLACTONE 100 MG: 100 TABLET, FILM COATED ORAL at 20:42

## 2022-01-27 RX ADMIN — BUSPIRONE HYDROCHLORIDE 5 MG: 15 TABLET ORAL at 20:42

## 2022-01-28 ENCOUNTER — APPOINTMENT (OUTPATIENT)
Dept: CT IMAGING | Facility: HOSPITAL | Age: 57
End: 2022-01-28

## 2022-01-28 LAB
ALBUMIN SERPL-MCNC: 2.8 G/DL (ref 3.5–5.2)
ALBUMIN/GLOB SERPL: 0.8 G/DL
ALP SERPL-CCNC: 122 U/L (ref 39–117)
ALT SERPL W P-5'-P-CCNC: 29 U/L (ref 1–41)
ANION GAP SERPL CALCULATED.3IONS-SCNC: 9 MMOL/L (ref 5–15)
AST SERPL-CCNC: 74 U/L (ref 1–40)
BILIRUB SERPL-MCNC: 1.3 MG/DL (ref 0–1.2)
BUN SERPL-MCNC: 15 MG/DL (ref 6–20)
BUN/CREAT SERPL: 17.4 (ref 7–25)
CALCIUM SPEC-SCNC: 8.7 MG/DL (ref 8.6–10.5)
CHLORIDE SERPL-SCNC: 106 MMOL/L (ref 98–107)
CO2 SERPL-SCNC: 26 MMOL/L (ref 22–29)
CREAT SERPL-MCNC: 0.86 MG/DL (ref 0.76–1.27)
DEPRECATED RDW RBC AUTO: 50.8 FL (ref 37–54)
EOSINOPHIL # BLD MANUAL: 0.07 10*3/MM3 (ref 0–0.4)
EOSINOPHIL NFR BLD MANUAL: 4 % (ref 0.3–6.2)
ERYTHROCYTE [DISTWIDTH] IN BLOOD BY AUTOMATED COUNT: 14.9 % (ref 12.3–15.4)
GFR SERPL CREATININE-BSD FRML MDRD: 92 ML/MIN/1.73
GLOBULIN UR ELPH-MCNC: 3.4 GM/DL
GLUCOSE BLDC GLUCOMTR-MCNC: 134 MG/DL (ref 70–130)
GLUCOSE BLDC GLUCOMTR-MCNC: 91 MG/DL (ref 70–130)
GLUCOSE BLDC GLUCOMTR-MCNC: 93 MG/DL (ref 70–130)
GLUCOSE SERPL-MCNC: 107 MG/DL (ref 65–99)
HCT VFR BLD AUTO: 31.3 % (ref 37.5–51)
HGB BLD-MCNC: 10.3 G/DL (ref 13–17.7)
LYMPHOCYTES # BLD MANUAL: 0.31 10*3/MM3 (ref 0.7–3.1)
LYMPHOCYTES NFR BLD MANUAL: 11 % (ref 5–12)
MCH RBC QN AUTO: 29.9 PG (ref 26.6–33)
MCHC RBC AUTO-ENTMCNC: 32.9 G/DL (ref 31.5–35.7)
MCV RBC AUTO: 90.7 FL (ref 79–97)
MONOCYTES # BLD: 0.2 10*3/MM3 (ref 0.1–0.9)
NEUTROPHILS # BLD AUTO: 1.25 10*3/MM3 (ref 1.7–7)
NEUTROPHILS NFR BLD MANUAL: 68 % (ref 42.7–76)
OVALOCYTES BLD QL SMEAR: ABNORMAL
PLAT MORPH BLD: NORMAL
PLATELET # BLD AUTO: 73 10*3/MM3 (ref 140–450)
PMV BLD AUTO: 10.3 FL (ref 6–12)
POTASSIUM SERPL-SCNC: 3.9 MMOL/L (ref 3.5–5.2)
PROT SERPL-MCNC: 6.2 G/DL (ref 6–8.5)
RBC # BLD AUTO: 3.45 10*6/MM3 (ref 4.14–5.8)
SODIUM SERPL-SCNC: 141 MMOL/L (ref 136–145)
VARIANT LYMPHS NFR BLD MANUAL: 17 % (ref 19.6–45.3)
WBC MORPH BLD: NORMAL
WBC NRBC COR # BLD: 1.84 10*3/MM3 (ref 3.4–10.8)

## 2022-01-28 PROCEDURE — 80053 COMPREHEN METABOLIC PANEL: CPT | Performed by: INTERNAL MEDICINE

## 2022-01-28 PROCEDURE — 25010000002 NA FERRIC GLUC CPLX PER 12.5 MG: Performed by: INTERNAL MEDICINE

## 2022-01-28 PROCEDURE — 99232 SBSQ HOSP IP/OBS MODERATE 35: CPT | Performed by: INTERNAL MEDICINE

## 2022-01-28 PROCEDURE — 63710000001 ONDANSETRON ODT 4 MG TABLET DISPERSIBLE: Performed by: INTERNAL MEDICINE

## 2022-01-28 PROCEDURE — 85007 BL SMEAR W/DIFF WBC COUNT: CPT | Performed by: INTERNAL MEDICINE

## 2022-01-28 PROCEDURE — 85025 COMPLETE CBC W/AUTO DIFF WBC: CPT | Performed by: INTERNAL MEDICINE

## 2022-01-28 PROCEDURE — 63710000001 DIPHENHYDRAMINE PER 50 MG: Performed by: INTERNAL MEDICINE

## 2022-01-28 PROCEDURE — 70450 CT HEAD/BRAIN W/O DYE: CPT

## 2022-01-28 PROCEDURE — 82962 GLUCOSE BLOOD TEST: CPT

## 2022-01-28 RX ADMIN — MORPHINE SULFATE 15 MG: 15 TABLET ORAL at 03:11

## 2022-01-28 RX ADMIN — SODIUM CHLORIDE 250 MG: 9 INJECTION, SOLUTION INTRAVENOUS at 11:27

## 2022-01-28 RX ADMIN — POTASSIUM CHLORIDE 20 MEQ: 10 TABLET, EXTENDED RELEASE ORAL at 08:29

## 2022-01-28 RX ADMIN — RIFAXIMIN 550 MG: 550 TABLET ORAL at 22:22

## 2022-01-28 RX ADMIN — BUSPIRONE HYDROCHLORIDE 5 MG: 15 TABLET ORAL at 22:22

## 2022-01-28 RX ADMIN — BUSPIRONE HYDROCHLORIDE 5 MG: 15 TABLET ORAL at 08:31

## 2022-01-28 RX ADMIN — AMLODIPINE BESYLATE 10 MG: 10 TABLET ORAL at 08:31

## 2022-01-28 RX ADMIN — LACTULOSE 20 G: 20 SOLUTION ORAL at 08:29

## 2022-01-28 RX ADMIN — Medication 400 MG: at 08:31

## 2022-01-28 RX ADMIN — FAMOTIDINE 20 MG: 20 TABLET, FILM COATED ORAL at 19:13

## 2022-01-28 RX ADMIN — FUROSEMIDE 120 MG: 80 TABLET ORAL at 08:30

## 2022-01-28 RX ADMIN — MAGNESIUM OXIDE 400 MG (241.3 MG MAGNESIUM) TABLET 400 MG: TABLET at 08:31

## 2022-01-28 RX ADMIN — MORPHINE SULFATE 15 MG: 15 TABLET ORAL at 11:27

## 2022-01-28 RX ADMIN — SPIRONOLACTONE 100 MG: 100 TABLET, FILM COATED ORAL at 08:30

## 2022-01-28 RX ADMIN — LIDOCAINE 1 PATCH: 50 PATCH TOPICAL at 08:31

## 2022-01-28 RX ADMIN — DIPHENHYDRAMINE HYDROCHLORIDE 25 MG: 25 CAPSULE ORAL at 19:12

## 2022-01-28 RX ADMIN — ATORVASTATIN CALCIUM 40 MG: 20 TABLET, FILM COATED ORAL at 08:29

## 2022-01-28 RX ADMIN — SERTRALINE HYDROCHLORIDE 50 MG: 50 TABLET, FILM COATED ORAL at 08:31

## 2022-01-28 RX ADMIN — BUSPIRONE HYDROCHLORIDE 5 MG: 15 TABLET ORAL at 15:50

## 2022-01-28 RX ADMIN — PANTOPRAZOLE SODIUM 40 MG: 40 TABLET, DELAYED RELEASE ORAL at 08:29

## 2022-01-28 RX ADMIN — RIFAXIMIN 550 MG: 550 TABLET ORAL at 08:30

## 2022-01-28 RX ADMIN — MORPHINE SULFATE 15 MG: 15 TABLET ORAL at 22:22

## 2022-01-28 RX ADMIN — GLIPIZIDE 2.5 MG: 5 TABLET ORAL at 08:30

## 2022-01-28 RX ADMIN — ONDANSETRON 4 MG: 4 TABLET, ORALLY DISINTEGRATING ORAL at 08:49

## 2022-01-29 LAB
ALBUMIN SERPL-MCNC: 2.4 G/DL (ref 3.5–5.2)
ALBUMIN/GLOB SERPL: 0.7 G/DL
ALP SERPL-CCNC: 116 U/L (ref 39–117)
ALT SERPL W P-5'-P-CCNC: 25 U/L (ref 1–41)
ANION GAP SERPL CALCULATED.3IONS-SCNC: 8.7 MMOL/L (ref 5–15)
AST SERPL-CCNC: 63 U/L (ref 1–40)
BASOPHILS # BLD AUTO: 0.01 10*3/MM3 (ref 0–0.2)
BASOPHILS NFR BLD AUTO: 0.5 % (ref 0–1.5)
BILIRUB SERPL-MCNC: 1.2 MG/DL (ref 0–1.2)
BUN SERPL-MCNC: 16 MG/DL (ref 6–20)
BUN/CREAT SERPL: 20.3 (ref 7–25)
CALCIUM SPEC-SCNC: 8.7 MG/DL (ref 8.6–10.5)
CHLORIDE SERPL-SCNC: 105 MMOL/L (ref 98–107)
CO2 SERPL-SCNC: 26.3 MMOL/L (ref 22–29)
CREAT SERPL-MCNC: 0.79 MG/DL (ref 0.76–1.27)
DEPRECATED RDW RBC AUTO: 50.3 FL (ref 37–54)
EOSINOPHIL # BLD AUTO: 0.07 10*3/MM3 (ref 0–0.4)
EOSINOPHIL NFR BLD AUTO: 3.4 % (ref 0.3–6.2)
ERYTHROCYTE [DISTWIDTH] IN BLOOD BY AUTOMATED COUNT: 15.4 % (ref 12.3–15.4)
GFR SERPL CREATININE-BSD FRML MDRD: 102 ML/MIN/1.73
GLOBULIN UR ELPH-MCNC: 3.3 GM/DL
GLUCOSE BLDC GLUCOMTR-MCNC: 117 MG/DL (ref 70–130)
GLUCOSE BLDC GLUCOMTR-MCNC: 125 MG/DL (ref 70–130)
GLUCOSE SERPL-MCNC: 89 MG/DL (ref 65–99)
HCT VFR BLD AUTO: 28 % (ref 37.5–51)
HGB BLD-MCNC: 9.2 G/DL (ref 13–17.7)
IMM GRANULOCYTES # BLD AUTO: 0.01 10*3/MM3 (ref 0–0.05)
IMM GRANULOCYTES NFR BLD AUTO: 0.5 % (ref 0–0.5)
LYMPHOCYTES # BLD AUTO: 0.64 10*3/MM3 (ref 0.7–3.1)
LYMPHOCYTES NFR BLD AUTO: 31.1 % (ref 19.6–45.3)
MCH RBC QN AUTO: 29.6 PG (ref 26.6–33)
MCHC RBC AUTO-ENTMCNC: 32.9 G/DL (ref 31.5–35.7)
MCV RBC AUTO: 90 FL (ref 79–97)
MONOCYTES # BLD AUTO: 0.31 10*3/MM3 (ref 0.1–0.9)
MONOCYTES NFR BLD AUTO: 15 % (ref 5–12)
NEUTROPHILS NFR BLD AUTO: 1.02 10*3/MM3 (ref 1.7–7)
NEUTROPHILS NFR BLD AUTO: 49.5 % (ref 42.7–76)
NRBC BLD AUTO-RTO: 0 /100 WBC (ref 0–0.2)
PLATELET # BLD AUTO: 57 10*3/MM3 (ref 140–450)
PMV BLD AUTO: 10.4 FL (ref 6–12)
POTASSIUM SERPL-SCNC: 4 MMOL/L (ref 3.5–5.2)
PROT SERPL-MCNC: 5.7 G/DL (ref 6–8.5)
RBC # BLD AUTO: 3.11 10*6/MM3 (ref 4.14–5.8)
SODIUM SERPL-SCNC: 140 MMOL/L (ref 136–145)
WBC NRBC COR # BLD: 2.06 10*3/MM3 (ref 3.4–10.8)

## 2022-01-29 PROCEDURE — 80053 COMPREHEN METABOLIC PANEL: CPT | Performed by: INTERNAL MEDICINE

## 2022-01-29 PROCEDURE — 85025 COMPLETE CBC W/AUTO DIFF WBC: CPT | Performed by: INTERNAL MEDICINE

## 2022-01-29 PROCEDURE — 82962 GLUCOSE BLOOD TEST: CPT

## 2022-01-29 RX ADMIN — MORPHINE SULFATE 15 MG: 15 TABLET ORAL at 06:19

## 2022-01-29 RX ADMIN — SPIRONOLACTONE 100 MG: 100 TABLET, FILM COATED ORAL at 20:26

## 2022-01-29 RX ADMIN — POTASSIUM CHLORIDE 20 MEQ: 10 TABLET, EXTENDED RELEASE ORAL at 09:48

## 2022-01-29 RX ADMIN — LACTULOSE 20 G: 20 SOLUTION ORAL at 09:48

## 2022-01-29 RX ADMIN — GLIPIZIDE 2.5 MG: 5 TABLET ORAL at 06:20

## 2022-01-29 RX ADMIN — SERTRALINE HYDROCHLORIDE 50 MG: 50 TABLET, FILM COATED ORAL at 09:49

## 2022-01-29 RX ADMIN — BUSPIRONE HYDROCHLORIDE 5 MG: 15 TABLET ORAL at 09:50

## 2022-01-29 RX ADMIN — MORPHINE SULFATE 15 MG: 15 TABLET ORAL at 14:51

## 2022-01-29 RX ADMIN — ATORVASTATIN CALCIUM 40 MG: 20 TABLET, FILM COATED ORAL at 09:49

## 2022-01-29 RX ADMIN — RIFAXIMIN 550 MG: 550 TABLET ORAL at 20:26

## 2022-01-29 RX ADMIN — BUSPIRONE HYDROCHLORIDE 5 MG: 15 TABLET ORAL at 17:09

## 2022-01-29 RX ADMIN — LIDOCAINE 1 PATCH: 50 PATCH TOPICAL at 09:50

## 2022-01-29 RX ADMIN — FUROSEMIDE 120 MG: 80 TABLET ORAL at 20:26

## 2022-01-29 RX ADMIN — Medication 400 MG: at 09:49

## 2022-01-29 RX ADMIN — MAGNESIUM OXIDE 400 MG (241.3 MG MAGNESIUM) TABLET 400 MG: TABLET at 09:50

## 2022-01-29 RX ADMIN — RIFAXIMIN 550 MG: 550 TABLET ORAL at 09:49

## 2022-01-29 RX ADMIN — FUROSEMIDE 120 MG: 80 TABLET ORAL at 09:49

## 2022-01-29 RX ADMIN — SPIRONOLACTONE 100 MG: 100 TABLET, FILM COATED ORAL at 09:49

## 2022-01-29 RX ADMIN — AMLODIPINE BESYLATE 10 MG: 10 TABLET ORAL at 09:49

## 2022-01-29 RX ADMIN — LACTULOSE 20 G: 20 SOLUTION ORAL at 20:26

## 2022-01-29 RX ADMIN — MORPHINE SULFATE 15 MG: 15 TABLET ORAL at 22:57

## 2022-01-29 RX ADMIN — BUSPIRONE HYDROCHLORIDE 5 MG: 15 TABLET ORAL at 20:26

## 2022-01-29 RX ADMIN — PANTOPRAZOLE SODIUM 40 MG: 40 TABLET, DELAYED RELEASE ORAL at 06:19

## 2022-01-30 ENCOUNTER — READMISSION MANAGEMENT (OUTPATIENT)
Dept: CALL CENTER | Facility: HOSPITAL | Age: 57
End: 2022-01-30

## 2022-01-30 VITALS
WEIGHT: 237 LBS | SYSTOLIC BLOOD PRESSURE: 122 MMHG | TEMPERATURE: 97.9 F | HEIGHT: 68 IN | OXYGEN SATURATION: 93 % | RESPIRATION RATE: 18 BRPM | HEART RATE: 105 BPM | DIASTOLIC BLOOD PRESSURE: 91 MMHG | BODY MASS INDEX: 35.92 KG/M2

## 2022-01-30 LAB
ALBUMIN SERPL-MCNC: 3 G/DL (ref 3.5–5.2)
ALBUMIN/GLOB SERPL: 0.9 G/DL
ALP SERPL-CCNC: 132 U/L (ref 39–117)
ALT SERPL W P-5'-P-CCNC: 31 U/L (ref 1–41)
ANION GAP SERPL CALCULATED.3IONS-SCNC: 9 MMOL/L (ref 5–15)
AST SERPL-CCNC: 71 U/L (ref 1–40)
BASOPHILS # BLD AUTO: 0.01 10*3/MM3 (ref 0–0.2)
BASOPHILS NFR BLD AUTO: 0.3 % (ref 0–1.5)
BILIRUB SERPL-MCNC: 1.7 MG/DL (ref 0–1.2)
BUN SERPL-MCNC: 15 MG/DL (ref 6–20)
BUN/CREAT SERPL: 15.8 (ref 7–25)
CALCIUM SPEC-SCNC: 8.9 MG/DL (ref 8.6–10.5)
CHLORIDE SERPL-SCNC: 100 MMOL/L (ref 98–107)
CO2 SERPL-SCNC: 27 MMOL/L (ref 22–29)
CREAT SERPL-MCNC: 0.95 MG/DL (ref 0.76–1.27)
DEPRECATED RDW RBC AUTO: 47.1 FL (ref 37–54)
EOSINOPHIL # BLD AUTO: 0.08 10*3/MM3 (ref 0–0.4)
EOSINOPHIL NFR BLD AUTO: 2.5 % (ref 0.3–6.2)
ERYTHROCYTE [DISTWIDTH] IN BLOOD BY AUTOMATED COUNT: 15.1 % (ref 12.3–15.4)
GFR SERPL CREATININE-BSD FRML MDRD: 82 ML/MIN/1.73
GLOBULIN UR ELPH-MCNC: 3.3 GM/DL
GLUCOSE BLDC GLUCOMTR-MCNC: 116 MG/DL (ref 70–130)
GLUCOSE BLDC GLUCOMTR-MCNC: 94 MG/DL (ref 70–130)
GLUCOSE SERPL-MCNC: 103 MG/DL (ref 65–99)
HCT VFR BLD AUTO: 30 % (ref 37.5–51)
HGB BLD-MCNC: 10.2 G/DL (ref 13–17.7)
IMM GRANULOCYTES # BLD AUTO: 0.02 10*3/MM3 (ref 0–0.05)
IMM GRANULOCYTES NFR BLD AUTO: 0.6 % (ref 0–0.5)
LYMPHOCYTES # BLD AUTO: 0.69 10*3/MM3 (ref 0.7–3.1)
LYMPHOCYTES NFR BLD AUTO: 21.4 % (ref 19.6–45.3)
MCH RBC QN AUTO: 29.7 PG (ref 26.6–33)
MCHC RBC AUTO-ENTMCNC: 34 G/DL (ref 31.5–35.7)
MCV RBC AUTO: 87.5 FL (ref 79–97)
MONOCYTES # BLD AUTO: 0.46 10*3/MM3 (ref 0.1–0.9)
MONOCYTES NFR BLD AUTO: 14.3 % (ref 5–12)
NEUTROPHILS NFR BLD AUTO: 1.96 10*3/MM3 (ref 1.7–7)
NEUTROPHILS NFR BLD AUTO: 60.9 % (ref 42.7–76)
NRBC BLD AUTO-RTO: 0 /100 WBC (ref 0–0.2)
PLATELET # BLD AUTO: 86 10*3/MM3 (ref 140–450)
PMV BLD AUTO: 11.3 FL (ref 6–12)
POTASSIUM SERPL-SCNC: 4 MMOL/L (ref 3.5–5.2)
PROT SERPL-MCNC: 6.3 G/DL (ref 6–8.5)
RBC # BLD AUTO: 3.43 10*6/MM3 (ref 4.14–5.8)
SODIUM SERPL-SCNC: 136 MMOL/L (ref 136–145)
WBC NRBC COR # BLD: 3.22 10*3/MM3 (ref 3.4–10.8)

## 2022-01-30 PROCEDURE — 82962 GLUCOSE BLOOD TEST: CPT

## 2022-01-30 PROCEDURE — 80053 COMPREHEN METABOLIC PANEL: CPT | Performed by: INTERNAL MEDICINE

## 2022-01-30 PROCEDURE — 85025 COMPLETE CBC W/AUTO DIFF WBC: CPT | Performed by: INTERNAL MEDICINE

## 2022-01-30 RX ADMIN — BUSPIRONE HYDROCHLORIDE 5 MG: 15 TABLET ORAL at 08:55

## 2022-01-30 RX ADMIN — MAGNESIUM OXIDE 400 MG (241.3 MG MAGNESIUM) TABLET 400 MG: TABLET at 08:55

## 2022-01-30 RX ADMIN — AMLODIPINE BESYLATE 10 MG: 10 TABLET ORAL at 08:55

## 2022-01-30 RX ADMIN — LIDOCAINE 1 PATCH: 50 PATCH TOPICAL at 08:55

## 2022-01-30 RX ADMIN — Medication 400 MG: at 08:55

## 2022-01-30 RX ADMIN — ATORVASTATIN CALCIUM 40 MG: 20 TABLET, FILM COATED ORAL at 08:55

## 2022-01-30 RX ADMIN — GLIPIZIDE 2.5 MG: 5 TABLET ORAL at 08:54

## 2022-01-30 RX ADMIN — PANTOPRAZOLE SODIUM 40 MG: 40 TABLET, DELAYED RELEASE ORAL at 06:45

## 2022-01-30 RX ADMIN — FUROSEMIDE 120 MG: 80 TABLET ORAL at 08:55

## 2022-01-30 RX ADMIN — RIFAXIMIN 550 MG: 550 TABLET ORAL at 08:55

## 2022-01-30 RX ADMIN — LACTULOSE 20 G: 20 SOLUTION ORAL at 08:54

## 2022-01-30 RX ADMIN — POTASSIUM CHLORIDE 20 MEQ: 10 TABLET, EXTENDED RELEASE ORAL at 08:54

## 2022-01-30 RX ADMIN — SPIRONOLACTONE 100 MG: 100 TABLET, FILM COATED ORAL at 08:55

## 2022-01-30 RX ADMIN — SERTRALINE HYDROCHLORIDE 50 MG: 50 TABLET, FILM COATED ORAL at 08:55

## 2022-01-30 RX ADMIN — MORPHINE SULFATE 15 MG: 15 TABLET ORAL at 06:45

## 2022-01-30 NOTE — OUTREACH NOTE
Prep Survey      Responses   [facility] patient discharged from? [place]   Is LACE score < 7 ? No   Emergency Room discharge w/ pulse ox? No   Eligibility [facility]   Date of Admission 01/19/22   Discharge diagnosis Acute CVA s/p TPA,  Covid 19   Does the patient have one of the following disease processes/diagnoses(primary or secondary)? COVID-19   Does the patient have Home health ordered? Yes   What is the Home health agency?  [facility]   Is there a DME ordered? No   Prep survey completed? Yes          Dina Stuart RN

## 2022-01-31 ENCOUNTER — TRANSITIONAL CARE MANAGEMENT TELEPHONE ENCOUNTER (OUTPATIENT)
Dept: CALL CENTER | Facility: HOSPITAL | Age: 57
End: 2022-01-31

## 2022-01-31 NOTE — OUTREACH NOTE
Call Center TCM Note      Responses   [facility] patient discharged from? [place]   Does the patient have one of the following disease processes/diagnoses(primary or secondary)? COVID-19   COVID-19 underlying condition? Stroke   TCM attempt successful? Yes   Call start time 1301   Call end time 1319   Discharge diagnosis Acute CVA s/p TPA,  Covid 19   Meds reviewed with patient/caregiver? Yes   Is the patient having any side effects they believe may be caused by any medication additions or changes? No   Does the patient have all medications ordered at discharge? Yes   Medication comments Pt will  medications today   Does the patient have a primary care provider?  Yes   Comments regarding PCP Hospital PCP FOLLOW UP APPOINTMENT IS 2/10/22@115pm   Does the patient have an appointment with their PCP or specialist within 7 days of discharge? Greater than 7 days   What is preventing the patient from scheduling follow up appointments within 7 days of discharge? Earlier appointment not available   Has the patient kept scheduled appointments due by today? N/A   What is the Home health agency?  [facility]    Has home health visited the patient within 72 hours of discharge? Unsure   Home health comments *discussed inpatient rehab with patient---it appears multiple referrals were declined   What DME was ordered? [facility]   Has all DME been delivered? Yes   Psychosocial issues? No   Did the patient receive a copy of their discharge instructions? Yes   Did the patient receive a copy of COVID-19 specific instructions? Yes   Nursing interventions Reviewed instructions with patient   What is the patient's perception of their health status since discharge? Same  [Pt reports difficulty walking and reports he must sit on side of bed to use urinal--still has left side upper arm weakness plus he has bruising/pain to his left upper arm from previous IV site--education on s/s infection provided. ]   Does the patient have any of  the following symptoms? None  [Headache only---denies respiratory s/s]   Nursing Interventions Nurse provided patient education   Pulse Ox monitoring None   Is the patient/caregiver able to teach back steps to recovery at home? Rest and rebuild strength, gradually increase activity,  Set small, achievable goals for return to baseline health   If the patient is a current smoker, are they able to teach back resources for cessation? Not a smoker   Is the patient/caregiver able to teach back the hierarchy of who to call/visit for symptoms/problems? PCP, Specialist, Home health nurse, Urgent Care, ED, 911 Yes   Does the patient require any assistance with activities of daily living such as eating, bathing, dressing, walking, etc.? No   ADL comments Pt reports it was not determined for certain if he had a stroke---no final discharge notes on chart at time of call.  Britni   Does the patient understand the diet ordered at discharge? Yes  [Encouraged a healthy low fat/low cholesterol diet]   Is the patient able to teach back FAST for Stroke? Yes  [REviewed FAST s/s with patient]   Is the patient/caregiver able to teach back the risk factors for a stroke? High Cholesterol,  High blood pressure-goal below 120/80,  Diabetes   TCM call completed? Yes   Wrap up additional comments Pt with left sided weakness and still offers c/o headache---discussed need for more pain medications throughout call.  Pt is a difficult historian and also difficult to provide education--mati need reinforcement.          Araceli Loya RN    1/31/2022, 13:29 EST

## 2022-02-01 ENCOUNTER — READMISSION MANAGEMENT (OUTPATIENT)
Dept: CALL CENTER | Facility: HOSPITAL | Age: 57
End: 2022-02-01

## 2022-02-01 LAB — REF LAB TEST METHOD: NORMAL

## 2022-02-01 NOTE — OUTREACH NOTE
COVID-19 Week 1 Survey      Responses   [facility] patient discharged from? [place]   Does the patient have one of the following disease processes/diagnoses(primary or secondary)? COVID-19   COVID-19 underlying condition? Stroke   Call Number Call 2   Week 1 Call successful? Yes   Call start time 1358   Call end time 1359   Discharge diagnosis Acute CVA s/p TPA,  Covid 19   Is patient permission given to speak with other caregiver? Yes   List who call center can speak with Christo friend    Person spoke with today (if not patient) and relationship Christo friend    Meds reviewed with patient/caregiver? Yes   What is the patient's perception of their health status since discharge? Improving   COVID-19 call completed? Yes   Wrap up additional comments Pt did not answer - friend did answer phone-reports pt was doing ok and did not have any complaints at this time          Asia Apodaca RN

## 2022-02-01 NOTE — PAYOR COMM NOTE
"DISCHARGED   REF #[id number]      Parish Wright (56 y.o. Male)             Date of Birth Social Security Number Address Home Phone MRN    [id number]  [id number] [place] [address] [phone] [id number]    L[employer]-day Marital Status             Moravian        Admission Date Admission Type Admitting Provider Attending Provider Department, Room/Bed    1/19/22 Urgent   [address], P586/1    Discharge Date Discharge Disposition Discharge Destination          1/30/2022 Home-Health Care Svc              Attending Provider: (none)   Allergies: No Known Allergies    Isolation: None   Infection: COVID (confirmed) (01/20/22)   Code Status: Prior   Advance Care Planning Activity    Ht: 172.7 cm (68\")   Wt: 108 kg (237 lb)    Admission Cmt: None   Principal Problem: None                Active Insurance as of 1/19/2022     Primary Coverage     Payor Plan Insurance Group Employer/Plan Group    [facility] BY SUZANNE [id number]     Payor Plan Address Payor Plan Phone Number Payor Plan Fax Number Effective Dates    [address]   1/1/2021 - None Entered    [place] [address]       Subscriber Name Subscriber Birth Date Member ID       PARISH WRIGHT 1965 [id number]                 Emergency Contacts      (Rel.) Home Phone Work Phone Mobile Phone    Christo Soria (Friend) [phone] -- --    DG MURPHY (Sister) -- -- [phone]    Fidencio, Alissa -- -- --            Discharge Order (From admission, onward)     Start     Ordered    01/28/22 1329  Discharge patient  Once        Expected Discharge Date: 01/28/22    Expected Discharge Time: Afternoon    Discharge Disposition: Home-Health Care Svc    Physician of Record for Attribution - Please select from Treatment Team: ANGEL JERNIGAN [[id number]]    Review needed by CMO to determine Physician of Record: No       Question Answer Comment   Physician of Record for " Attribution - Please select from Treatment Team ANGEL JERNIGAN    Review needed by CMO to determine Physician of Record No        01/28/22 1329    01/23/22 1338  Discharge patient  Once,   Status:  Canceled        Expected Discharge Date: 01/23/22    Discharge Disposition: Home or Self Care    Physician of Record for Attribution - Please select from Treatment Team: ANGEL JERNIGAN [[id number]]    Review needed by CMO to determine Physician of Record: No       Question Answer Comment   Physician of Record for Attribution - Please select from Treatment Team ANGEL JERNIGAN    Review needed by CMO to determine Physician of Record No        01/23/22 1331

## 2022-02-02 ENCOUNTER — READMISSION MANAGEMENT (OUTPATIENT)
Dept: CALL CENTER | Facility: HOSPITAL | Age: 57
End: 2022-02-02

## 2022-02-02 NOTE — OUTREACH NOTE
COVID-19 Week 1 Survey      Responses   [facility] patient discharged from? [place]   Does the patient have one of the following disease processes/diagnoses(primary or secondary)? COVID-19   COVID-19 underlying condition? Stroke   Call Number Call 3   Week 1 Call successful? No   Discharge diagnosis Acute CVA s/p TPA,  Covid 19          Kavya Cervantes LPN

## 2022-02-07 ENCOUNTER — READMISSION MANAGEMENT (OUTPATIENT)
Dept: CALL CENTER | Facility: HOSPITAL | Age: 57
End: 2022-02-07

## 2022-02-07 NOTE — OUTREACH NOTE
"COVID-19 Week 2 Survey      Responses   [facility] patient discharged from? [place]   Does the patient have one of the following disease processes/diagnoses(primary or secondary)? COVID-19   COVID-19 underlying condition? Stroke   Call Number Call 1   COVID-19 Week 2: Call 1 attempt successful? Yes   Call start time 0838   Call end time 0845   Discharge diagnosis Acute CVA s/p TPA,  Covid 19   Has the patient kept scheduled appointments due by today? N/A   Comments Aware of upcoming hospital appt   Psychosocial issues? No   What is the patient's perception of their health status since discharge? New symptoms unrelated to diagnosis  [Has some pain in neck area. Thinks he has a pinched nerve but will ask at next appt. ]   Does the patient have any of the following symptoms? Cough   Nursing Interventions Nurse provided patient education   Pulse Ox monitoring None   Is the patient/caregiver able to teach back steps to recovery at home? Rest and rebuild strength, gradually increase activity   Is the patient/caregiver able to teach back the hierarchy of who to call/visit for symptoms/problems? PCP, Specialist, Home health nurse, Urgent Care, ED, 911 Yes   Does the patient require any assistance with activities of daily living such as eating, bathing, dressing, walking, etc.? No   Does the patient have any residual symptoms from stroke/TIA? Yes   Is the patient/caregiver able to teach back the risk factors for a stroke? High Cholesterol,  High blood pressure-goal below 120/80,  Diabetes   Additional teach back comments Alot of pain with left arm. Has a \"knot\" on neck--thinks he has a pinched nerve. Advised him to have PCP look at it at his upcomiing appt and advised heat to neck which he has been doing.    COVID-19 call completed? Yes   Wrap up additional comments No issues from Covid.           Shraddha Blakeyl RN  "

## 2022-02-10 ENCOUNTER — OFFICE VISIT (OUTPATIENT)
Dept: INTERNAL MEDICINE | Facility: CLINIC | Age: 57
End: 2022-02-10

## 2022-02-10 ENCOUNTER — READMISSION MANAGEMENT (OUTPATIENT)
Dept: CALL CENTER | Facility: HOSPITAL | Age: 57
End: 2022-02-10

## 2022-02-10 VITALS
SYSTOLIC BLOOD PRESSURE: 136 MMHG | HEIGHT: 68 IN | OXYGEN SATURATION: 98 % | HEART RATE: 97 BPM | WEIGHT: 243.2 LBS | DIASTOLIC BLOOD PRESSURE: 74 MMHG | BODY MASS INDEX: 36.86 KG/M2 | TEMPERATURE: 98.3 F

## 2022-02-10 DIAGNOSIS — K76.0 NONALCOHOLIC FATTY LIVER DISEASE: ICD-10-CM

## 2022-02-10 DIAGNOSIS — I10 HYPERTENSION, UNSPECIFIED TYPE: ICD-10-CM

## 2022-02-10 DIAGNOSIS — R18.8 CIRRHOSIS OF LIVER WITH ASCITES, UNSPECIFIED HEPATIC CIRRHOSIS TYPE: ICD-10-CM

## 2022-02-10 DIAGNOSIS — I63.9 CEREBROVASCULAR ACCIDENT (CVA), UNSPECIFIED MECHANISM: Primary | ICD-10-CM

## 2022-02-10 DIAGNOSIS — G89.29 CHRONIC LOW BACK PAIN, UNSPECIFIED BACK PAIN LATERALITY, UNSPECIFIED WHETHER SCIATICA PRESENT: ICD-10-CM

## 2022-02-10 DIAGNOSIS — M54.50 CHRONIC LOW BACK PAIN, UNSPECIFIED BACK PAIN LATERALITY, UNSPECIFIED WHETHER SCIATICA PRESENT: ICD-10-CM

## 2022-02-10 DIAGNOSIS — E11.9 DIABETES MELLITUS WITHOUT COMPLICATION: ICD-10-CM

## 2022-02-10 DIAGNOSIS — K74.60 CIRRHOSIS OF LIVER WITH ASCITES, UNSPECIFIED HEPATIC CIRRHOSIS TYPE: ICD-10-CM

## 2022-02-10 DIAGNOSIS — R60.0 LOWER EXTREMITY EDEMA: ICD-10-CM

## 2022-02-10 DIAGNOSIS — F41.9 ANXIETY: ICD-10-CM

## 2022-02-10 DIAGNOSIS — K27.4 GASTROINTESTINAL HEMORRHAGE ASSOCIATED WITH PEPTIC ULCER: ICD-10-CM

## 2022-02-10 PROCEDURE — 99215 OFFICE O/P EST HI 40 MIN: CPT | Performed by: INTERNAL MEDICINE

## 2022-02-10 RX ORDER — ATORVASTATIN CALCIUM 40 MG/1
40 TABLET, FILM COATED ORAL DAILY
Qty: 90 TABLET | Refills: 3 | Status: SHIPPED | OUTPATIENT
Start: 2022-02-10 | End: 2022-02-11

## 2022-02-10 RX ORDER — LIDOCAINE 50 MG/G
1 PATCH TOPICAL
Qty: 90 PATCH | Refills: 1 | Status: SHIPPED | OUTPATIENT
Start: 2022-02-10 | End: 2022-02-11

## 2022-02-10 RX ORDER — SPIRONOLACTONE 100 MG/1
100 TABLET, FILM COATED ORAL 2 TIMES DAILY
Qty: 180 TABLET | Refills: 1 | Status: SHIPPED | OUTPATIENT
Start: 2022-02-10 | End: 2022-02-11

## 2022-02-10 RX ORDER — AMLODIPINE BESYLATE 10 MG/1
10 TABLET ORAL DAILY
Qty: 90 TABLET | Refills: 1 | Status: SHIPPED | OUTPATIENT
Start: 2022-02-10 | End: 2022-02-11

## 2022-02-10 RX ORDER — OMEPRAZOLE 20 MG/1
40 CAPSULE, DELAYED RELEASE ORAL DAILY
Qty: 90 CAPSULE | Refills: 3 | Status: SHIPPED | OUTPATIENT
Start: 2022-02-10 | End: 2022-02-11

## 2022-02-10 RX ORDER — FUROSEMIDE 40 MG/1
80 TABLET ORAL 2 TIMES DAILY
Qty: 360 TABLET | Refills: 1 | Status: SHIPPED | OUTPATIENT
Start: 2022-02-10 | End: 2022-02-11

## 2022-02-10 RX ORDER — LACTULOSE 10 G/15ML
20 SOLUTION ORAL 3 TIMES DAILY
Qty: 1892 ML | Refills: 3 | Status: SHIPPED | OUTPATIENT
Start: 2022-02-10 | End: 2022-02-11

## 2022-02-10 NOTE — OUTREACH NOTE
COVID-19 Week 2 Survey      Responses   [facility] patient discharged from? [place]   Does the patient have one of the following disease processes/diagnoses(primary or secondary)? COVID-19   COVID-19 underlying condition? Stroke   Call Number Call 2   COVID-19 Week 2: Call 1 attempt successful? Yes   Call start time 1537   Call end time 1540   Discharge diagnosis Acute CVA s/p TPA,  Covid 19   Person spoke with today (if not patient) and relationship Christo friend    Medication comments friend is unsure   Does the patient have a primary care provider?  Yes   Comments regarding PCP Hospital PCP FOLLOW UP APPOINTMENT IS 2/10/22@115pm   Does the patient have an appointment with their PCP or specialist within 7 days of discharge? Yes   Has the patient kept scheduled appointments due by today? Yes   What DME was ordered? [facility]   Has all DME been delivered? Yes   Psychosocial issues? No   Did the patient receive a copy of their discharge instructions? Yes   Did the patient receive a copy of COVID-19 specific instructions? Yes   Nursing interventions Reviewed instructions with patient   What is the patient's perception of their health status since discharge? Improving   Does the patient have any of the following symptoms? None   Nursing Interventions Nurse provided patient education   Pulse Ox monitoring None   Is the patient/caregiver able to teach back steps to recovery at home? Eat a well-balance diet,  Rest and rebuild strength, gradually increase activity   If the patient is a current smoker, are they able to teach back resources for cessation? Not a smoker   Is the patient/caregiver able to teach back the hierarchy of who to call/visit for symptoms/problems? PCP, Specialist, Home health nurse, Urgent Care, ED, 911 Yes   Does the patient require any assistance with activities of daily living such as eating, bathing, dressing, walking, etc.? Yes   Is the patient able to teach back FAST for Stroke? Yes   Is  the patient/caregiver able to teach back the risk factors for a stroke? High Cholesterol,  History of TIAs   COVID-19 call completed? Yes   Wrap up additional comments Friend reports Pt is doing much better.           Tiffanie Wiseman RN

## 2022-02-10 NOTE — PROGRESS NOTES
Chief Complaint  Hospital Follow Up Visit and Med Refill (patients states he needs refill on everything )    Subjective          Nic Nicolas presents to [facility]  History of Present Illness   Discussed case with patient's sister. Encouraged use of pill planners. Patient would be good candidate for assisted living. Patient's sister unfortunately unable to provide many details about medication regimen.   Patient has lost weight with approx 70 lbs over last 2 months.   cerebrovascular accident- patient unsure of current medications.   Cirrhosis- patient unsure if he has started rifaximin or lactulose.   Pancytopenia- noted previously. Thought related to cirrhosis.  hypertension- patient denies Has, dizziness, chest pain.   DM2- last HgbA1c with good control on last check. Concern with hypoglycemic episodes. Patient does report compliance with insulin 60U.    Current Outpatient Medications   Medication Instructions   • Alcohol Swabs (Alcohol Prep) pads 1 pad, Does not apply, 5 Times Daily   • amLODIPine (NORVASC) 10 mg, Oral, Daily   • atorvastatin (LIPITOR) 40 mg, Oral, Daily   • busPIRone (BUSPAR) 5 mg, Oral, 3 Times Daily   • furosemide (LASIX) 80 mg, Oral, 2 Times Daily   • Glucose Blood (Blood Glucose Test) strip 1 strip, Subcutaneous, 5 Times Daily   • Insulin Glargine (LANTUS SOLOSTAR) 30 Units, Subcutaneous, Daily   • lactulose (CHRONULAC) 20 g, Oral, 3 Times Daily   • Lancets (freestyle) lancets 1 each, Other, As Needed   • lidocaine (LIDODERM) 5 % 1 patch, Transdermal, Every 24 Hours Scheduled, Remove & Discard patch within 12 hours or as directed by MD   • omeprazole (PRILOSEC) 40 mg, Oral, Daily   • ondansetron ODT (ZOFRAN-ODT) 4 mg, Sublingual, 4 Times Daily PRN   • Potassium 99 mg, Mouth/Throat, 2 Times Daily   • riFAXIMin (XIFAXAN) 550 mg, Oral, Every 12 Hours Scheduled   • sertraline (ZOLOFT) 50 mg, Oral, Daily   • spironolactone (ALDACTONE) 100 mg,  "Oral, 2 Times Daily   • Vitamin B-2 (RIBOFLAVIN) 400 mg, Oral, Daily       The following portions of the patient's history were reviewed and updated as appropriate: allergies, current medications, past family history, past medical history, past social history, past surgical history, and problem list.    Objective   Vital Signs:   /74   Pulse 97   Temp 98.3 °F (36.8 °C) (Temporal)   Ht 172.7 cm (68\")   Wt 110 kg (243 lb 3.2 oz)   SpO2 98%   BMI 36.98 kg/m²     Wt Readings from Last 3 Encounters:   02/10/22 110 kg (243 lb 3.2 oz)   01/29/22 108 kg (237 lb)   01/19/22 113 kg (250 lb)     BP Readings from Last 3 Encounters:   02/10/22 136/74   01/30/22 122/91   01/19/22 138/91     Physical Exam   Appearance: No acute distress, well-nourished  Head: normocephalic, atraumatic  Eyes: extraocular movements intact, no scleral icterus, no conjunctival injection  Ears, Nose, and Throat: external ears normal, nares patent, moist mucous membranes  Cardiovascular: regular rate and rhythm. no murmurs, rales, or rhonchi. no edema  Respiratory: breathing comfortably, symmetric chest rise, clear to auscultation bilaterally. No wheezes, rales, or rhonchi.  Neuro: alert and oriented to time, place, and person. Normal gait  Psych: normal mood and affect     Result Review :{Labs  Result Review  Imaging  Med Tab  Media  Procedures :23}   The following data was reviewed by: Jonh Franco Jr, MD on 02/10/2022:  Common labs    Common Labsle 1/28/22 1/28/22 1/29/22 1/29/22 1/30/22 1/30/22    0936 0936 [id number] [id number] 0825 0825   Glucose 107 (A)   89  103 (A)   BUN 15   16  15   Creatinine 0.86   0.79  0.95   eGFR Non  Am 92   102  82   Sodium 141   140  136   Potassium 3.9   4.0  4.0   Chloride 106   105  100   Calcium 8.7   8.7  8.9   Albumin 2.80 (A)   2.40 (A)  3.00 (A)   Total Bilirubin 1.3 (A)   1.2  1.7 (A)   Alkaline Phosphatase 122 (A)   116  132 (A)   AST (SGOT) 74 (A)   63 (A)  71 (A)   ALT (SGPT) 29   25 "  31   WBC  1.84 (A) 2.06 (A)  3.22 (A)    Hemoglobin  10.3 (A) 9.2 (A)  10.2 (A)    Hematocrit  31.3 (A) 28.0 (A)  30.0 (A)    Platelets  73 (A) 57 (A)  86 (A)    (A) Abnormal value       Comments are available for some flowsheets but are not being displayed.             Lab Results   Component Value Date    COVID19 Detected (C) 01/20/2022    RSV Not Detected 01/20/2022    INR 1.50 (L) 01/19/2022    BILIRUBINUR Small (1+) (A) 12/05/2021        Assessment and Plan    Diagnoses and all orders for this visit:    1. Cerebrovascular accident (CVA), unspecified mechanism (HCC) (Primary)  Comments:  recent hospitalization. reviewed notes from neurology and critical care. cont statin.   Orders:  -     Discontinue: atorvastatin (LIPITOR) 40 MG tablet; Take 1 tablet by mouth Daily.  Dispense: 90 tablet; Refill: 3  -     atorvastatin (LIPITOR) 40 MG tablet; Take 1 tablet by mouth Daily.  Dispense: 90 tablet; Refill: 3  -     Ambulatory Referral to Chronic Care Management    2. Cirrhosis of liver with ascites, unspecified hepatic cirrhosis type (HCC)  Comments:  prior history. cont diuretics, rifaxmin, and lactulose  Orders:  -     Discontinue: lactulose (CHRONULAC) 10 GM/15ML solution; Take 30 mL by mouth 3 (Three) Times a Day.  Dispense: 1892 mL; Refill: 3  -     Discontinue: riFAXIMin (XIFAXAN) 550 MG tablet; Take 1 tablet by mouth Every 12 (Twelve) Hours. Indications: Impaired Brain Function due to Liver Disease  Dispense: 180 tablet; Refill: 3  -     Discontinue: spironolactone (ALDACTONE) 100 MG tablet; Take 1 tablet by mouth 2 (Two) Times a Day.  Dispense: 180 tablet; Refill: 1  -     Discontinue: furosemide (LASIX) 40 MG tablet; Take 2 tablets by mouth 2 (Two) Times a Day for 15 days.  Dispense: 360 tablet; Refill: 1  -     furosemide (LASIX) 40 MG tablet; Take 2 tablets by mouth 2 (Two) Times a Day.  Dispense: 360 tablet; Refill: 1  -     lactulose (CHRONULAC) 10 GM/15ML solution; Take 30 mL by mouth 3 (Three) Times a  Day.  Dispense: 1892 mL; Refill: 3  -     riFAXIMin (XIFAXAN) 550 MG tablet; Take 1 tablet by mouth Every 12 (Twelve) Hours. Indications: Impaired Brain Function due to Liver Disease  Dispense: 180 tablet; Refill: 3  -     spironolactone (ALDACTONE) 100 MG tablet; Take 1 tablet by mouth 2 (Two) Times a Day.  Dispense: 180 tablet; Refill: 1  -     Ambulatory Referral to Chronic Care Management    3. Hypertension, unspecified type  Comments:  well controlled today.   Orders:  -     Discontinue: amLODIPine (NORVASC) 10 MG tablet; Take 1 tablet by mouth Daily.  Dispense: 90 tablet; Refill: 1  -     amLODIPine (NORVASC) 10 MG tablet; Take 1 tablet by mouth Daily.  Dispense: 90 tablet; Refill: 1  -     Ambulatory Referral to Chronic Care Management    4. Diabetes mellitus without complication (HCC)  Comments:  on lantus 66U daily. would like to potentnially start SGLT-2 for extra diuresis as well as GLP-1 for control with less insulin.  Orders:  -     Discontinue: Insulin Glargine (LANTUS SOLOSTAR) 100 UNIT/ML injection pen; Inject 30 Units under the skin into the appropriate area as directed Daily for 30 days.  Dispense: 15 pen; Refill: 3  -     Insulin Glargine (LANTUS SOLOSTAR) 100 UNIT/ML injection pen; Inject 30 Units under the skin into the appropriate area as directed Daily.  Dispense: 15 pen; Refill: 3  -     Ambulatory Referral to Chronic Care Management    5. Nonalcoholic fatty liver disease  -     Ambulatory Referral to Chronic Care Management    6. Gastrointestinal hemorrhage associated with peptic ulcer  Comments:  restart PPI.   Orders:  -     Discontinue: omeprazole (priLOSEC) 20 MG capsule; Take 2 capsules by mouth Daily.  Dispense: 90 capsule; Refill: 3  -     omeprazole (priLOSEC) 20 MG capsule; Take 2 capsules by mouth Daily.  Dispense: 90 capsule; Refill: 3  -     Ambulatory Referral to Chronic Care Management    7. Anxiety  Comments:  cont zoloft as previously worked well for pt.   Orders:  -      Discontinue: sertraline (ZOLOFT) 50 MG tablet; Take 1 tablet by mouth Daily.  Dispense: 90 tablet; Refill: 3  -     sertraline (ZOLOFT) 50 MG tablet; Take 1 tablet by mouth Daily.  Dispense: 90 tablet; Refill: 3  -     Ambulatory Referral to Chronic Care Management    8. Chronic low back pain, unspecified back pain laterality, unspecified whether sciatica present  Comments:  discussed with pt that mind-altering medications are not ideal right now. pt is persistent on opioids, but need to defer. will Rx lidocaine patches.   Orders:  -     Discontinue: lidocaine (LIDODERM) 5 %; Place 1 patch on the skin as directed by provider Daily. Remove & Discard patch within 12 hours or as directed by MD  Dispense: 90 patch; Refill: 1  -     lidocaine (LIDODERM) 5 %; Place 1 patch on the skin as directed by provider Daily. Remove & Discard patch within 12 hours or as directed by MD  Dispense: 90 patch; Refill: 1  -     Ambulatory Referral to Chronic Care Management    9. Lower extremity edema  Comments:  likel 2/2 cirrhosis. restart diuretics.   Orders:  -     Discontinue: Potassium 99 MG tablet; Apply 99 mg to the mouth or throat 2 (Two) Times a Day.  Dispense: 180 each; Refill: 0  -     Potassium 99 MG tablet; Apply 99 mg to the mouth or throat 2 (Two) Times a Day.  Dispense: 180 each; Refill: 0  -     Ambulatory Referral to Chronic Care Management          Medications Discontinued During This Encounter   Medication Reason   • baclofen (LIORESAL) 10 MG tablet *Therapy completed   • Morphine (MSIR) 15 MG tablet *Therapy completed   • glimepiride (Amaryl) 1 MG tablet *Therapy completed   • amLODIPine (NORVASC) 10 MG tablet Reorder   • Insulin Glargine (LANTUS SOLOSTAR) 100 UNIT/ML injection pen    • omeprazole (priLOSEC) 20 MG capsule Reorder   • sertraline (ZOLOFT) 50 MG tablet Reorder   • furosemide (LASIX) 40 MG tablet Reorder   • spironolactone (ALDACTONE) 100 MG tablet Reorder   • riFAXIMin (XIFAXAN) 550 MG tablet Reorder    • lactulose (CHRONULAC) 10 GM/15ML solution Reorder   • atorvastatin (LIPITOR) 40 MG tablet Reorder   • lidocaine (LIDODERM) 5 % Reorder   • Potassium 99 MG tablet Reorder   • lactulose (CHRONULAC) 10 GM/15ML solution    • riFAXIMin (XIFAXAN) 550 MG tablet    • sertraline (ZOLOFT) 50 MG tablet    • amLODIPine (NORVASC) 10 MG tablet    • atorvastatin (LIPITOR) 40 MG tablet    • Insulin Glargine (LANTUS SOLOSTAR) 100 UNIT/ML injection pen    • omeprazole (priLOSEC) 20 MG capsule    • spironolactone (ALDACTONE) 100 MG tablet    • furosemide (LASIX) 40 MG tablet    • lidocaine (LIDODERM) 5 %    • Potassium 99 MG tablet         I spent 48 minutes caring for Nic on this date of service. This time includes time spent by me in the following activities:preparing for the visit, reviewing tests, obtaining and/or reviewing a separately obtained history, performing a medically appropriate examination and/or evaluation , counseling and educating the patient/family/caregiver, ordering medications, tests, or procedures, referring and communicating with other health care professionals , documenting information in the medical record, independently interpreting results and communicating that information with the patient/family/caregiver and care coordination     Follow Up   Return in about 3 weeks (around 3/3/2022).  Patient was given instructions and counseling regarding his condition or for health maintenance advice. Please see specific information pulled into the AVS if appropriate.

## 2022-02-11 ENCOUNTER — TELEPHONE (OUTPATIENT)
Dept: INTERNAL MEDICINE | Facility: CLINIC | Age: 57
End: 2022-02-11

## 2022-02-11 RX ORDER — FUROSEMIDE 40 MG/1
80 TABLET ORAL 2 TIMES DAILY
Qty: 360 TABLET | Refills: 1 | Status: SHIPPED | OUTPATIENT
Start: 2022-02-11 | End: 2022-03-09 | Stop reason: HOSPADM

## 2022-02-11 RX ORDER — AMLODIPINE BESYLATE 10 MG/1
10 TABLET ORAL DAILY
Qty: 90 TABLET | Refills: 1 | Status: SHIPPED | OUTPATIENT
Start: 2022-02-11 | End: 2022-03-09 | Stop reason: HOSPADM

## 2022-02-11 RX ORDER — OMEPRAZOLE 20 MG/1
40 CAPSULE, DELAYED RELEASE ORAL DAILY
Qty: 90 CAPSULE | Refills: 3 | Status: SHIPPED | OUTPATIENT
Start: 2022-02-11 | End: 2022-04-12

## 2022-02-11 RX ORDER — LIDOCAINE 50 MG/G
1 PATCH TOPICAL
Qty: 90 PATCH | Refills: 1 | Status: SHIPPED | OUTPATIENT
Start: 2022-02-11 | End: 2022-03-09 | Stop reason: HOSPADM

## 2022-02-11 RX ORDER — ATORVASTATIN CALCIUM 40 MG/1
40 TABLET, FILM COATED ORAL DAILY
Qty: 90 TABLET | Refills: 3 | Status: SHIPPED | OUTPATIENT
Start: 2022-02-11 | End: 2022-04-12 | Stop reason: SDUPTHER

## 2022-02-11 RX ORDER — SPIRONOLACTONE 100 MG/1
100 TABLET, FILM COATED ORAL 2 TIMES DAILY
Qty: 180 TABLET | Refills: 1 | Status: SHIPPED | OUTPATIENT
Start: 2022-02-11 | End: 2022-04-12 | Stop reason: SDUPTHER

## 2022-02-11 RX ORDER — LACTULOSE 10 G/15ML
20 SOLUTION ORAL 3 TIMES DAILY
Qty: 1892 ML | Refills: 3 | Status: SHIPPED | OUTPATIENT
Start: 2022-02-11 | End: 2022-04-12

## 2022-02-11 NOTE — TELEPHONE ENCOUNTER
[facility] called and stated the patient is [facility]. They told me they waited on hold with [facility] for 45 minutes and wasn't able to talk to anyone. They want to know if you will re-send the medications but to [facility]. I explained to her that they really need to be transferred pharmacy to pharmacy, but the pharmacy tech wanted me to ask you to re-prescribe them to their pharmacy.      If you are willing to do that the scripts need to go to [facility] and I have done changed the pharmacy in the patient's chart.     Thank you.
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
## 2022-02-14 ENCOUNTER — PATIENT OUTREACH (OUTPATIENT)
Dept: CASE MANAGEMENT | Facility: OTHER | Age: 57
End: 2022-02-14

## 2022-02-14 ENCOUNTER — TELEPHONE (OUTPATIENT)
Dept: INTERNAL MEDICINE | Facility: CLINIC | Age: 57
End: 2022-02-14

## 2022-02-14 ENCOUNTER — REFERRAL TRIAGE (OUTPATIENT)
Dept: CASE MANAGEMENT | Facility: OTHER | Age: 57
End: 2022-02-14

## 2022-02-14 DIAGNOSIS — R18.8 CIRRHOSIS OF LIVER WITH ASCITES, UNSPECIFIED HEPATIC CIRRHOSIS TYPE: ICD-10-CM

## 2022-02-14 DIAGNOSIS — I63.9 CEREBROVASCULAR ACCIDENT (CVA), UNSPECIFIED MECHANISM: Primary | ICD-10-CM

## 2022-02-14 DIAGNOSIS — K74.60 CIRRHOSIS OF LIVER WITH ASCITES, UNSPECIFIED HEPATIC CIRRHOSIS TYPE: ICD-10-CM

## 2022-02-14 PROCEDURE — 99487 CPLX CHRNC CARE 1ST 60 MIN: CPT | Performed by: INTERNAL MEDICINE

## 2022-02-14 PROCEDURE — 99489 CPLX CHRNC CARE EA ADDL 30: CPT | Performed by: INTERNAL MEDICINE

## 2022-02-14 NOTE — TELEPHONE ENCOUNTER
Caller: Nic Nicolas    Relationship to patient: Self    Best call back number: [phone]    Patient is needing: PATIENT CALLED STATING HE WOULD LIKE TO KNOW IF HE CAN BE PRESCRIBED SOMETHING IN FOR PAIN. THE PATIENT STATED HE IS HAVING PAIN IN HIS LOWER BACK AND HIS ARM. THE PATIENT STATED HE WOULD LIKE TO KNOW IF HIS PCP CAN CALL HIM IN SOMETHING TO HELP WITH THIS UNTIL HE GETS EVERYTHING SITUATED AT [facility] PAIN AND . THE PATIENT WOULD LIKE A CALL BACK TO LET HIM KNOW THIS HAS BEEN SENT TO THE PHARMACY PLEASE ADVISE THANK YOU.         [facility] - [place], [place] - [address] [place] [address], [address] - [phone] [facility] [phone]   [phone]

## 2022-02-14 NOTE — OUTREACH NOTE
Ambulatory Case Management Note    CCM Interim Update        I called the patient and explained the CCM program in great detail . I explained he was under no obligation and could stop serviice at any time he verbalized understanding and consented to the program .     As I talked with the patient he stated that his pain was still his major issue and was currently seeing pain Management in [facility]. Stated that he had called them this AM and they had stated that he would need clearance for further treatment. The patient was not sure what that ment. I stated I would call and determine what was needed. He stated that he currently takes a lot of meds and sometimes gets confused about how to take his meds and states he would be open to assisted living or some type of assistance to help with meds. I stated I would follow up with PM as well as pharmacy to inquire about pill packs and he agreed.     I called the pain management service and was informed by Rebeca that the MD there had considered an implantable med pump to help with the patients pain. Prior to the pump implant the patient would need graciela from [facility] and a physiatry eval . I was told that the MD would make a decision today [facility] to use pump or not. I was informed that the office would contact me today if that decision was made.     I talked with PCP , at length as to the specific needs of the patient . I shared with him the details of the pain management  requirements and he verbalized understanding . We discussed pill packs for the patient as well as possible assisted living as well as asking  to join the patient case and he agreed.      I called the patient pharmacy and they stated that the pill pack service was available and it would be $14.00 per month which includes delivery of meds to the patients home. I stated that I would need to get approval from the patient and I could return the call and she agreed.     I placed a referral  for  and will reach out to Sandy to bring her up to speed on the needs of the patient .     There are no recently modified care plans to display for this patient.      Fili Davies MA  Ambulatory Case Management    2/14/2022, 09:30 EST

## 2022-02-15 DIAGNOSIS — R11.0 NAUSEA: ICD-10-CM

## 2022-02-15 RX ORDER — ONDANSETRON 4 MG/1
4 TABLET, ORALLY DISINTEGRATING ORAL 4 TIMES DAILY PRN
Qty: 60 TABLET | Refills: 0 | Status: SHIPPED | OUTPATIENT
Start: 2022-02-15 | End: 2022-05-12 | Stop reason: SDUPTHER

## 2022-02-15 NOTE — TELEPHONE ENCOUNTER
We discussed he is not an ideal candidate for a controlled substance at this time. I have sent in lidocaine patches to help in the meantime.
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
## 2022-02-15 NOTE — TELEPHONE ENCOUNTER
Caller: Fidencio Nicloli Paz    Relationship: Self    Best call back number: [phone]    Requested Prescriptions:   Requested Prescriptions     Pending Prescriptions Disp Refills   • ondansetron ODT (ZOFRAN-ODT) 4 MG disintegrating tablet 60 tablet 0     Sig: Place 1 tablet under the tongue 4 (Four) Times a Day As Needed for Nausea or Vomiting.        Pharmacy where request should be sent: [address] - [phone] [facility] [phone]      Additional details provided by patient:     Does the patient have less than a 3 day supply:  [x] Yes  [] No    Kristin Decker Rep   02/15/22 11:52 EST

## 2022-02-15 NOTE — TELEPHONE ENCOUNTER
ATTEMPTED TO CONTACT PT PER PROVIDER'S INSTRUCTIONS     NO ANSWER     LEFT VOICEMAIL WITH INSTRUCTIONS TO RETURN CALL TO OFFICE AT [phone]    OK FOR HUB TO ADVISE/READ   Jonh Franco Jr., MD  We discussed he is not an ideal candidate for a controlled substance at this time. I have sent in lidocaine patches to help in the meantime.

## 2022-02-16 ENCOUNTER — PATIENT OUTREACH (OUTPATIENT)
Dept: CASE MANAGEMENT | Facility: OTHER | Age: 57
End: 2022-02-16

## 2022-02-16 NOTE — OUTREACH NOTE
ASSESSMENT    Staff Spoke With: MSW spoke with patient to discuss resources needed.    Reason for the Referral: Additional Care Services    Patient's Reported Cognitive Status: Intermittent Confusion    Does the patient have Advanced Care Planning documents?: No. Patient declined advanced care planning education.    Patient's Reported Physical Status: Needs Assistance    Patient utilizes these assistive devices and/or in home care services: None    Patient's Current Living Arrangements (Home Environment, Caregiver Support, Others in Home): Patient lives alone.    Patient's  Status: Did not serve in the .    Patient's Employment Status: Patient is disabled.    Patient's Spiritual Affiliation/Impact on Care: None noted    Patient's Behavioral Health/Substance Abuse History: None noted    SDOH updated and reviewed with the patient during this program:     Financial Resource Strain: Low Risk    • Difficulty of Paying Living Expenses: Not very hard       Food Insecurity: No Food Insecurity   • Worried About Running Out of Food in the Last Year: Never true   • Ran Out of Food in the Last Year: Never true       Transportation Needs: No Transportation Needs   • Lack of Transportation (Medical): No   • Lack of Transportation (Non-Medical): No       Housing Stability: Low Risk    • Unable to Pay for Housing in the Last Year: No   • Number of Places Lived in the Last Year: 1   • Unstable Housing in the Last Year: No      Continuing Care   [facility] & [address] [place] [address]    Phone: [phone]    Resource for: Financial Resource Strain, Food Insecurity     Patient Outreach    MSW spoke with patient to discuss resources needed. Patient states that he is having some mild confusion over the past few months and is needing additional assistance in the home. ISAIAH Madison, is working to set patient up with his medications. Patient states that his  sister is coming over to assist with his medications as well. MSW spoke with patient about obtaining assistance in the home with caregivers. Patient interested and given resources for [place] [facility] on this day. Patient states that he does not feel he is ready for a nursing home facility and cannot afford Assisted Living, but he is open to extra assistance at home.    DISCUSSION AND PLAN    MSW provided resources and available if needed in the future.    Verbal consent obtained to contact/refer to the following individuals and/or agencies: [facility] if needed      JESSENIA Vernon   , Ambulatory Case Management    2/16/2022 08:19 EST

## 2022-02-17 ENCOUNTER — READMISSION MANAGEMENT (OUTPATIENT)
Dept: CALL CENTER | Facility: HOSPITAL | Age: 57
End: 2022-02-17

## 2022-02-17 ENCOUNTER — TELEPHONE (OUTPATIENT)
Dept: NEUROLOGY | Facility: CLINIC | Age: 57
End: 2022-02-17

## 2022-02-17 ENCOUNTER — TELEPHONE (OUTPATIENT)
Dept: INTERNAL MEDICINE | Facility: CLINIC | Age: 57
End: 2022-02-17

## 2022-02-17 NOTE — OUTREACH NOTE
Case Management Call Center Follow-up      Responses   [facility] Call Center Tracking Reason? No Home Health   Has the Call Center Case Management Follow-up issue been resolved? Yes   Follow-up Comments I reached out to Trena from [facility] and she stated that they did reach out to the patient and he denied their services.  At this time we cannot order HH because its been too long since he has been seen by us.  He would need to go to PCP to get an order and they would need to set it up if he still needed HH.          Shannon Epley, RN    2/17/2022, 13:31 EST

## 2022-02-17 NOTE — OUTREACH NOTE
COVID-19 Week 3 Survey      Responses   [facility] patient discharged from? [place]   Does the patient have one of the following disease processes/diagnoses(primary or secondary)? COVID-19   COVID-19 underlying condition? Stroke   Call Number Call 1   COVID-19 Week 3: Call 1 attempt successful? Yes   Call start time 1224   Call end time 1247   Discharge diagnosis Acute CVA s/p TPA,  Covid 19   Has home health visited the patient within 72 hours of discharge? No   Home health comments Patient has not been contacted by . Message sent to    P[employer] monitoring None   If the patient is a current smoker, are they able to teach back resources for cessation? Not a smoker   Is the patient/caregiver able to teach back the hierarchy of who to call/visit for symptoms/problems? PCP, Specialist, Home health nurse, Urgent Care, ED, 911 Yes   COVID-19 call completed? Yes   Wrap up additional comments Patient states he is in constant pain, waiting on a call from pain management. Patient was referred to [facility] [place] [facility] for assistance with meds. RN l/m at [facility] to contact patient. Message sent to  regarding HH           Johana Cho RN

## 2022-02-17 NOTE — TELEPHONE ENCOUNTER
Caller: [facility]    Relationship to patient:     Best call back number: [phone]    New or established patient?  [x] New  [] Established    Date of discharge: 1-30-22    Facility discharged from: [facility]    Diagnosis/Symptoms: Acute migraine headache with aura / HX OF TBI      Specialty Only: Did you see a [facility] health provider?    [x] Yes  [] No  If so, who?    Dr. Bassett AND BESSIE Rdz    Schedule an appointment with Herberth Vázquez MD (Neurology) in 2 months (3/29/2022)

## 2022-02-17 NOTE — TELEPHONE ENCOUNTER
Will forward to front office staff to help schedule a new pain management referral. We have discussed he is not an ideal candidate for opioid pain medications.

## 2022-02-17 NOTE — TELEPHONE ENCOUNTER
"PT VERIFIED     PT(PATIENT) STATES HE IS IN A LOT OF PAIN    PT(PATIENT) STATES HE WAS DIRECTED TO CONTACT DR FRANCO FROM HIS PAIN DOCTOR BECAUSE HE HASN'T BEEN ACCEPTED BY THEIR PAIN MANAGEMENT CLINIC    PT(PATIENT) STATES HE NOW USES [facility]    PT(PATIENT) WAS ADVISED THAT DR FRANCO DID SEND IN lidocaine (LIDODERM) 5 % (2022)    PT(PATIENT) STATES THE PAIN PATCHES DO NOT HELP HIM    PT(PATIENT) STATES HE IS ALSO USING A HEATING PAD TO HELP WITH HIS PAIN, AND THAT DOES HELP SOME    PT(PATIENT) STATES HE WAS PREVIOUSLY ON MORPHINE AND THAT DID HELP HIM    PT(PATIENT) STATES HE CAN'T EAT AND HE CAN'T SLEEP BECAUSE OF HIS PAIN    PT(PATIENT) IS VERY FRUSTRATED WITH THE PROCESS OF GETTING HELP FOR HIS PAIN MANAGEMENT     PT(PATIENT) STATES HE WOULD LIKE TO GO ON AND DIE BECAUSE HE CAN'T TAKE THE PAIN ANYMORE    PT(PATIENT) STATES HE DOES NOT WISH TO HARM HIMSELF WHEN ASKED IF HE HAS THOUGHTS OF SUICIDE    PT(PATIENT) STATES HE DOES NOT WANT TO HURT HIMSELF    PT(PATIENT) WAS REMINDED THAT HE HAS PREVIOUSLY DISCUSSED NARCOTIC MEDICATIONS WITH DR FRANCO AND WAS INFORMED OF THE FOLLOWING    Telephone with Jonh Franco Jr., MD (2022)    Jonh Franco Jr., MD  We discussed he is not an ideal candidate for a controlled substance at this time. I have sent in lidocaine patches to help in the meantime.         PT(PATIENT) STATES HE HAS REQUESTED HELP FROM MULTIPLE SOURCES    PT(PATIENT) STATES HE DOES NOT WISH TO BE PLACED WITH A NURSING HOME FACILITY    PT(PATIENT) STATES HE WOULD BE OPEN TO HOME HEALTH    Patient Outreach with Sandy Black MSW (2022)  Patient Outreach with Fili Davies MA (2022)    PT(PATIENT) STATES HE HAS SPOKEN WITH BOTH OF THE ABOVE AND DOES WISH TO RECEIVE HELP    PT(PATIENT) STATES HE HAS BEEN HAVING A LOT OF \"MANSOOR HORSE\" MUSCLE SPASMS    PT(PATIENT) IS REQUESTING MEDICATION TO HELP WITH MUSCLE PAIN AS WELL    PT(PATIENT) AGAIN STATED THAT HE " IS VERY FRUSTRATED WAITING FOR SOMEONE TO HELP HIM    PROVIDER PLEASE ADVISE

## 2022-02-21 ENCOUNTER — TELEPHONE (OUTPATIENT)
Dept: INTERNAL MEDICINE | Facility: CLINIC | Age: 57
End: 2022-02-21

## 2022-02-21 ENCOUNTER — TELEPHONE (OUTPATIENT)
Dept: CASE MANAGEMENT | Facility: OTHER | Age: 57
End: 2022-02-21

## 2022-02-21 DIAGNOSIS — K74.60 CIRRHOSIS OF LIVER WITH ASCITES, UNSPECIFIED HEPATIC CIRRHOSIS TYPE: Primary | ICD-10-CM

## 2022-02-21 DIAGNOSIS — I10 HYPERTENSION, UNSPECIFIED TYPE: ICD-10-CM

## 2022-02-21 DIAGNOSIS — R18.8 CIRRHOSIS OF LIVER WITH ASCITES, UNSPECIFIED HEPATIC CIRRHOSIS TYPE: Primary | ICD-10-CM

## 2022-02-21 NOTE — TELEPHONE ENCOUNTER
HUB WAS UNABLE TO WARM TRANSFER     Caller: Nic Nicolas    Relationship: Self    Best call back number: [phone]      What is the best time to reach you: ANYTIME     Who are you requesting to speak with (clinical staff, provider,  specific staff member):   CLINICAL       What was the call regarding: PATIENT IS CALLING ABOUT QUESTIONS FOR A ORTHOPEDICS REFERRAL     Do you require a callback: YES

## 2022-02-21 NOTE — TELEPHONE ENCOUNTER
CALLED PATIENT AND HE STATED HE DOESN'T KNOW WHO SENT HIM TO [facility]TH[facility] - IT MIGHT HAVE BEEN THE PAIN MANAGEMENT CLINIC IN [place].  STATED HE WRITES THESE THINGS DOWN BUT CAN'T KEEP UP OR REMEMBER WHAT THINGS ARE FOR.  STATED HE WAS GOING TO CALL THAT OFFICE AND WILL CALL US BACK IF NEEDED

## 2022-02-21 NOTE — TELEPHONE ENCOUNTER
I was informed today that the patient would not be a candidate for the implantable pain management sytem. Patient has been DC'd for local [facility] . Note was sent to PCP awaiting response as to who will manage his Pain meds.

## 2022-02-23 ENCOUNTER — TELEPHONE (OUTPATIENT)
Dept: INTERNAL MEDICINE | Facility: CLINIC | Age: 57
End: 2022-02-23

## 2022-02-23 NOTE — TELEPHONE ENCOUNTER
PT(PATIENT) VERIFIED     PT(PATIENT) STATES HE NEEDS TO SEE DR AVERY    PT(PATIENT) STATES HE PASSED OUT AT HOME LAST NIGHT    PT(PATIENT) STATES HE WAS ALONE AT HOME AT THE TIME     PT(PATIENT) STATES HE DOESN'T KNOW HOW LONG HE WAS UNCONSCIOUS    PT(PATIENT) STATES HE DOESN'T HAVE ANY MEMORY OF THE EVENT UNTIL HE WAKES UP    PT(PATIENT) STATES WITH PREVIOUS EPISODES OF SYNCOPE, HE USUALLY HAS A FRIEND WITH HIM THAT CALLS EMS FOR EMERGENCY CARE    PT(PATIENT) STATES HE TAKES ALL PRESCRIBED MEDICATION AS PRESCRIBED    PT(PATIENT) STATES HE HAS HIS SISTER WITH HIM TO HELP HIM FOR RIGHT NOW    PT(PATIENT) STATES HE DOES NOT WANT TO GO TO A NURSING HOME, BUT THINGS HAVE GOTTEN SO BAD HE FEELS LIKE HE NO LONGER HAS A CHOICE    PT(PATIENT) ADVISED TO GO TO THE ER    PT(PATIENT) STATES HE WILL GO TO THE ER    FORWARDED TO PCP AND CCM

## 2022-02-24 ENCOUNTER — APPOINTMENT (OUTPATIENT)
Dept: CT IMAGING | Facility: HOSPITAL | Age: 57
End: 2022-02-24

## 2022-02-24 ENCOUNTER — APPOINTMENT (OUTPATIENT)
Dept: GENERAL RADIOLOGY | Facility: HOSPITAL | Age: 57
End: 2022-02-24

## 2022-02-24 ENCOUNTER — PATIENT OUTREACH (OUTPATIENT)
Dept: CASE MANAGEMENT | Facility: OTHER | Age: 57
End: 2022-02-24

## 2022-02-24 ENCOUNTER — HOSPITAL ENCOUNTER (INPATIENT)
Facility: HOSPITAL | Age: 57
LOS: 13 days | Discharge: NURSING, REHAB OR LONG-TERM CARE | End: 2022-03-09
Attending: EMERGENCY MEDICINE | Admitting: INTERNAL MEDICINE

## 2022-02-24 ENCOUNTER — READMISSION MANAGEMENT (OUTPATIENT)
Dept: CALL CENTER | Facility: HOSPITAL | Age: 57
End: 2022-02-24

## 2022-02-24 DIAGNOSIS — I63.9 CEREBROVASCULAR ACCIDENT (CVA), UNSPECIFIED MECHANISM: Primary | ICD-10-CM

## 2022-02-24 DIAGNOSIS — K74.60 CIRRHOSIS OF LIVER WITH ASCITES, UNSPECIFIED HEPATIC CIRRHOSIS TYPE: ICD-10-CM

## 2022-02-24 DIAGNOSIS — K76.82 HEPATIC ENCEPHALOPATHY: Primary | ICD-10-CM

## 2022-02-24 DIAGNOSIS — R18.8 CIRRHOSIS OF LIVER WITH ASCITES, UNSPECIFIED HEPATIC CIRRHOSIS TYPE: ICD-10-CM

## 2022-02-24 DIAGNOSIS — R26.2 DIFFICULTY WALKING: ICD-10-CM

## 2022-02-24 PROBLEM — D69.6 THROMBOCYTOPENIA: Chronic | Status: ACTIVE | Noted: 2022-02-24

## 2022-02-24 LAB
ALBUMIN SERPL-MCNC: 3.2 G/DL (ref 3.5–5.2)
ALBUMIN/GLOB SERPL: 1 G/DL
ALP SERPL-CCNC: 192 U/L (ref 39–117)
ALT SERPL W P-5'-P-CCNC: 25 U/L (ref 1–41)
AMMONIA BLD-SCNC: 183 UMOL/L (ref 16–60)
ANION GAP SERPL CALCULATED.3IONS-SCNC: 8.8 MMOL/L (ref 5–15)
AST SERPL-CCNC: 48 U/L (ref 1–40)
BASOPHILS # BLD AUTO: 0.06 10*3/MM3 (ref 0–0.2)
BASOPHILS NFR BLD AUTO: 0.9 % (ref 0–1.5)
BILIRUB SERPL-MCNC: 1.1 MG/DL (ref 0–1.2)
BILIRUB UR QL STRIP: ABNORMAL
BUN SERPL-MCNC: 23 MG/DL (ref 6–20)
BUN/CREAT SERPL: 24 (ref 7–25)
CALCIUM SPEC-SCNC: 8.5 MG/DL (ref 8.6–10.5)
CHLORIDE SERPL-SCNC: 106 MMOL/L (ref 98–107)
CLARITY UR: CLEAR
CO2 SERPL-SCNC: 22.2 MMOL/L (ref 22–29)
COLOR UR: ABNORMAL
CREAT BLDA-MCNC: 0.9 MG/DL
CREAT SERPL-MCNC: 0.96 MG/DL (ref 0.76–1.27)
DEPRECATED RDW RBC AUTO: 58.3 FL (ref 37–54)
EOSINOPHIL # BLD AUTO: 0.33 10*3/MM3 (ref 0–0.4)
EOSINOPHIL NFR BLD AUTO: 5.1 % (ref 0.3–6.2)
ERYTHROCYTE [DISTWIDTH] IN BLOOD BY AUTOMATED COUNT: 16.9 % (ref 12.3–15.4)
GFR SERPL CREATININE-BSD FRML MDRD: 81 ML/MIN/1.73
GLOBULIN UR ELPH-MCNC: 3.1 GM/DL
GLUCOSE BLDC GLUCOMTR-MCNC: 149 MG/DL (ref 70–99)
GLUCOSE BLDC GLUCOMTR-MCNC: 161 MG/DL (ref 70–99)
GLUCOSE SERPL-MCNC: 177 MG/DL (ref 65–99)
GLUCOSE UR STRIP-MCNC: NEGATIVE MG/DL
HCT VFR BLD AUTO: 31.6 % (ref 37.5–51)
HGB BLD-MCNC: 10.7 G/DL (ref 13–17.7)
HGB UR QL STRIP.AUTO: NEGATIVE
HOLD SPECIMEN: NORMAL
HOLD SPECIMEN: NORMAL
IMM GRANULOCYTES # BLD AUTO: 0.02 10*3/MM3 (ref 0–0.05)
IMM GRANULOCYTES NFR BLD AUTO: 0.3 % (ref 0–0.5)
INR PPP: 1.6 (ref 2–3)
KETONES UR QL STRIP: NEGATIVE
LEUKOCYTE ESTERASE UR QL STRIP.AUTO: NEGATIVE
LYMPHOCYTES # BLD AUTO: 1.35 10*3/MM3 (ref 0.7–3.1)
LYMPHOCYTES NFR BLD AUTO: 20.8 % (ref 19.6–45.3)
MCH RBC QN AUTO: 32.1 PG (ref 26.6–33)
MCHC RBC AUTO-ENTMCNC: 33.9 G/DL (ref 31.5–35.7)
MCV RBC AUTO: 94.9 FL (ref 79–97)
MONOCYTES # BLD AUTO: 0.61 10*3/MM3 (ref 0.1–0.9)
MONOCYTES NFR BLD AUTO: 9.4 % (ref 5–12)
NEUTROPHILS NFR BLD AUTO: 4.13 10*3/MM3 (ref 1.7–7)
NEUTROPHILS NFR BLD AUTO: 63.5 % (ref 42.7–76)
NITRITE UR QL STRIP: NEGATIVE
NRBC BLD AUTO-RTO: 0 /100 WBC (ref 0–0.2)
PH UR STRIP.AUTO: 6 [PH] (ref 5–8)
PLATELET # BLD AUTO: 102 10*3/MM3 (ref 140–450)
PMV BLD AUTO: 12 FL (ref 6–12)
POTASSIUM SERPL-SCNC: 5.1 MMOL/L (ref 3.5–5.2)
PROT SERPL-MCNC: 6.3 G/DL (ref 6–8.5)
PROT UR QL STRIP: NEGATIVE
PROTHROMBIN TIME: 15.8 SECONDS (ref 9.4–12)
RBC # BLD AUTO: 3.33 10*6/MM3 (ref 4.14–5.8)
SODIUM SERPL-SCNC: 137 MMOL/L (ref 136–145)
SP GR UR STRIP: 1.02 (ref 1–1.03)
UROBILINOGEN UR QL STRIP: ABNORMAL
WBC NRBC COR # BLD: 6.5 10*3/MM3 (ref 3.4–10.8)
WHOLE BLOOD HOLD SPECIMEN: NORMAL
WHOLE BLOOD HOLD SPECIMEN: NORMAL

## 2022-02-24 PROCEDURE — 99285 EMERGENCY DEPT VISIT HI MDM: CPT

## 2022-02-24 PROCEDURE — 25010000002 ORPHENADRINE CITRATE PER 60 MG: Performed by: EMERGENCY MEDICINE

## 2022-02-24 PROCEDURE — 80053 COMPREHEN METABOLIC PANEL: CPT

## 2022-02-24 PROCEDURE — 85025 COMPLETE CBC W/AUTO DIFF WBC: CPT

## 2022-02-24 PROCEDURE — 85610 PROTHROMBIN TIME: CPT

## 2022-02-24 PROCEDURE — 93010 ELECTROCARDIOGRAM REPORT: CPT | Performed by: INTERNAL MEDICINE

## 2022-02-24 PROCEDURE — 99222 1ST HOSP IP/OBS MODERATE 55: CPT | Performed by: FAMILY MEDICINE

## 2022-02-24 PROCEDURE — 82140 ASSAY OF AMMONIA: CPT | Performed by: EMERGENCY MEDICINE

## 2022-02-24 PROCEDURE — 63710000001 INSULIN DETEMIR PER 5 UNITS: Performed by: FAMILY MEDICINE

## 2022-02-24 PROCEDURE — 93005 ELECTROCARDIOGRAM TRACING: CPT | Performed by: EMERGENCY MEDICINE

## 2022-02-24 PROCEDURE — 93005 ELECTROCARDIOGRAM TRACING: CPT

## 2022-02-24 PROCEDURE — 82962 GLUCOSE BLOOD TEST: CPT

## 2022-02-24 PROCEDURE — 82565 ASSAY OF CREATININE: CPT

## 2022-02-24 PROCEDURE — 81003 URINALYSIS AUTO W/O SCOPE: CPT | Performed by: EMERGENCY MEDICINE

## 2022-02-24 PROCEDURE — 70450 CT HEAD/BRAIN W/O DYE: CPT

## 2022-02-24 PROCEDURE — 71045 X-RAY EXAM CHEST 1 VIEW: CPT

## 2022-02-24 RX ORDER — LACTULOSE 10 G/15ML
20 SOLUTION ORAL 3 TIMES DAILY
Status: DISCONTINUED | OUTPATIENT
Start: 2022-02-24 | End: 2022-02-28

## 2022-02-24 RX ORDER — ONDANSETRON 2 MG/ML
4 INJECTION INTRAMUSCULAR; INTRAVENOUS EVERY 6 HOURS PRN
Status: DISCONTINUED | OUTPATIENT
Start: 2022-02-24 | End: 2022-03-09 | Stop reason: HOSPADM

## 2022-02-24 RX ORDER — SODIUM CHLORIDE 0.9 % (FLUSH) 0.9 %
10 SYRINGE (ML) INJECTION EVERY 12 HOURS SCHEDULED
Status: DISCONTINUED | OUTPATIENT
Start: 2022-02-24 | End: 2022-03-09 | Stop reason: HOSPADM

## 2022-02-24 RX ORDER — LANSOPRAZOLE
30 KIT EVERY MORNING
Status: DISCONTINUED | OUTPATIENT
Start: 2022-02-25 | End: 2022-02-24

## 2022-02-24 RX ORDER — DEXTROSE MONOHYDRATE 100 MG/ML
25 INJECTION, SOLUTION INTRAVENOUS
Status: DISCONTINUED | OUTPATIENT
Start: 2022-02-24 | End: 2022-03-09 | Stop reason: HOSPADM

## 2022-02-24 RX ORDER — BUSPIRONE HYDROCHLORIDE 5 MG/1
5 TABLET ORAL 3 TIMES DAILY
Status: DISCONTINUED | OUTPATIENT
Start: 2022-02-24 | End: 2022-03-08 | Stop reason: SDUPTHER

## 2022-02-24 RX ORDER — ATORVASTATIN CALCIUM 40 MG/1
40 TABLET, FILM COATED ORAL NIGHTLY
Status: DISCONTINUED | OUTPATIENT
Start: 2022-02-24 | End: 2022-03-09 | Stop reason: HOSPADM

## 2022-02-24 RX ORDER — SPIRONOLACTONE 100 MG/1
100 TABLET, FILM COATED ORAL 2 TIMES DAILY
Status: DISCONTINUED | OUTPATIENT
Start: 2022-02-24 | End: 2022-03-05

## 2022-02-24 RX ORDER — PANTOPRAZOLE SODIUM 40 MG/1
40 TABLET, DELAYED RELEASE ORAL
Status: DISCONTINUED | OUTPATIENT
Start: 2022-02-25 | End: 2022-03-09 | Stop reason: HOSPADM

## 2022-02-24 RX ORDER — ALBUTEROL SULFATE 90 UG/1
2 AEROSOL, METERED RESPIRATORY (INHALATION) EVERY 4 HOURS PRN
Status: ON HOLD | COMMUNITY
End: 2022-06-17 | Stop reason: SDUPTHER

## 2022-02-24 RX ORDER — POTASSIUM CHLORIDE 750 MG/1
10 CAPSULE, EXTENDED RELEASE ORAL DAILY
Status: DISCONTINUED | OUTPATIENT
Start: 2022-02-24 | End: 2022-03-09 | Stop reason: HOSPADM

## 2022-02-24 RX ORDER — LIDOCAINE 50 MG/G
1 PATCH TOPICAL
Status: DISCONTINUED | OUTPATIENT
Start: 2022-02-24 | End: 2022-03-03

## 2022-02-24 RX ORDER — LORAZEPAM 0.5 MG/1
0.5 TABLET ORAL EVERY 8 HOURS PRN
Status: DISCONTINUED | OUTPATIENT
Start: 2022-02-24 | End: 2022-02-27

## 2022-02-24 RX ORDER — FUROSEMIDE 80 MG
80 TABLET ORAL
Status: DISCONTINUED | OUTPATIENT
Start: 2022-02-24 | End: 2022-03-05

## 2022-02-24 RX ORDER — SODIUM CHLORIDE 0.9 % (FLUSH) 0.9 %
10 SYRINGE (ML) INJECTION AS NEEDED
Status: DISCONTINUED | OUTPATIENT
Start: 2022-02-24 | End: 2022-03-01

## 2022-02-24 RX ORDER — ORPHENADRINE CITRATE 30 MG/ML
60 INJECTION INTRAMUSCULAR; INTRAVENOUS ONCE
Status: COMPLETED | OUTPATIENT
Start: 2022-02-24 | End: 2022-02-24

## 2022-02-24 RX ORDER — AMLODIPINE BESYLATE 5 MG/1
10 TABLET ORAL DAILY
Status: DISCONTINUED | OUTPATIENT
Start: 2022-02-24 | End: 2022-03-09 | Stop reason: HOSPADM

## 2022-02-24 RX ORDER — SODIUM CHLORIDE 0.9 % (FLUSH) 0.9 %
10 SYRINGE (ML) INJECTION AS NEEDED
Status: DISCONTINUED | OUTPATIENT
Start: 2022-02-24 | End: 2022-03-09 | Stop reason: HOSPADM

## 2022-02-24 RX ORDER — TRAMADOL HYDROCHLORIDE 50 MG/1
50 TABLET ORAL EVERY 6 HOURS PRN
Status: DISCONTINUED | OUTPATIENT
Start: 2022-02-24 | End: 2022-03-01

## 2022-02-24 RX ORDER — LACTULOSE 10 G/15ML
30 SOLUTION ORAL DAILY
Status: DISCONTINUED | OUTPATIENT
Start: 2022-02-24 | End: 2022-02-24

## 2022-02-24 RX ORDER — NICOTINE POLACRILEX 4 MG
15 LOZENGE BUCCAL
Status: DISCONTINUED | OUTPATIENT
Start: 2022-02-24 | End: 2022-03-09 | Stop reason: HOSPADM

## 2022-02-24 RX ADMIN — AMLODIPINE BESYLATE 10 MG: 5 TABLET ORAL at 22:32

## 2022-02-24 RX ADMIN — ATORVASTATIN CALCIUM 40 MG: 40 TABLET, FILM COATED ORAL at 22:32

## 2022-02-24 RX ADMIN — Medication 10 ML: at 22:33

## 2022-02-24 RX ADMIN — TRAMADOL HYDROCHLORIDE 50 MG: 50 TABLET, COATED ORAL at 23:28

## 2022-02-24 RX ADMIN — BUSPIRONE HYDROCHLORIDE 5 MG: 5 TABLET ORAL at 22:31

## 2022-02-24 RX ADMIN — FUROSEMIDE 80 MG: 80 TABLET ORAL at 22:31

## 2022-02-24 RX ADMIN — RIFAXIMIN 550 MG: 550 TABLET ORAL at 22:31

## 2022-02-24 RX ADMIN — ORPHENADRINE CITRATE 60 MG: 60 INJECTION INTRAMUSCULAR; INTRAVENOUS at 16:28

## 2022-02-24 RX ADMIN — INSULIN DETEMIR 30 UNITS: 100 INJECTION, SOLUTION SUBCUTANEOUS at 22:33

## 2022-02-24 RX ADMIN — LACTULOSE 30 G: 20 SOLUTION ORAL at 16:07

## 2022-02-24 RX ADMIN — SPIRONOLACTONE 100 MG: 100 TABLET ORAL at 22:31

## 2022-02-24 RX ADMIN — LACTULOSE 20 G: 20 SOLUTION ORAL at 22:31

## 2022-02-24 RX ADMIN — POTASSIUM CHLORIDE 10 MEQ: 750 CAPSULE, EXTENDED RELEASE ORAL at 22:32

## 2022-02-24 RX ADMIN — LIDOCAINE 1 PATCH: 50 PATCH CUTANEOUS at 22:32

## 2022-02-24 RX ADMIN — SERTRALINE HYDROCHLORIDE 50 MG: 50 TABLET ORAL at 22:31

## 2022-02-24 NOTE — OUTREACH NOTE
COVID-19 Week 4 Survey      Responses   [facility] patient discharged from? [place]   Does the patient have one of the following disease processes/diagnoses(primary or secondary)? COVID-19   COVID-19 underlying condition? Stroke   Call Number Call 1   COVID-19 Week 4: Call 1 attempt successful? Yes   Call start time 1005   Call end time 1009   Discharge diagnosis Acute CVA s/p TPA,  Covid 19   Comments Pt states he fell and hit the floor. I have advised him to come to ED. He has to wait on his sister. Advised him to check his BS. Pt had been very disoriented he states.    Is the patient interested in additional calls from an ambulatory ?  NOTE:  applies to high risk patients requiring additional follow-up. Yes   Did the patient feel the follow up calls were helpful during their recovery period? Yes   Was the number of calls appropriate? Yes   Wrap up additional comments Very hard to undertsand pt. he states his arm and face are swollen. I have asked him to come on in to ED and he states he will once his sister comes to his house.           Shraddha Blakely RN

## 2022-02-24 NOTE — OUTREACH NOTE
Ambulatory Case Management Note    [facility] Interim Update    2-23-22       Late in the day I was informed by the PCP office that the patient had experienced a syncopal episode the night before and had called the PCP office wanting to be seen. The patient per note , was asked to be seen in the ED asa and at that time the patient agreed. I called the patient @ approximately 1645 and did not get an answer. I then called his sister , listed on chart. She stated that he had changed his mind about going to ED. She stated that he has had spells like this before. She stated that she would be checking on him in the AM and would try to convince him to be seen in the ED. I gave her my number and asked her to call if she had any non emergent questions but if it was an emergency to call 911. She verbalized understanding.     I reached out to PCP and advised him of the above.     There are no recently modified care plans to display for this patient.      Fili Davies MA  Ambulatory Case Management    2/24/2022, 07:48 EST

## 2022-02-25 LAB
ALBUMIN SERPL-MCNC: 2.9 G/DL (ref 3.5–5.2)
ALBUMIN/GLOB SERPL: 0.8 G/DL
ALP SERPL-CCNC: 129 U/L (ref 39–117)
ALT SERPL W P-5'-P-CCNC: 23 U/L (ref 1–41)
AMMONIA BLD-SCNC: 123 UMOL/L (ref 16–60)
AMMONIA BLD-SCNC: 153 UMOL/L (ref 16–60)
ANION GAP SERPL CALCULATED.3IONS-SCNC: 8.9 MMOL/L (ref 5–15)
AST SERPL-CCNC: 46 U/L (ref 1–40)
BILIRUB SERPL-MCNC: 1.2 MG/DL (ref 0–1.2)
BUN SERPL-MCNC: 20 MG/DL (ref 6–20)
BUN/CREAT SERPL: 20.8 (ref 7–25)
CALCIUM SPEC-SCNC: 9 MG/DL (ref 8.6–10.5)
CHLORIDE SERPL-SCNC: 107 MMOL/L (ref 98–107)
CO2 SERPL-SCNC: 24.1 MMOL/L (ref 22–29)
CREAT SERPL-MCNC: 0.96 MG/DL (ref 0.76–1.27)
GFR SERPL CREATININE-BSD FRML MDRD: 81 ML/MIN/1.73
GLOBULIN UR ELPH-MCNC: 3.5 GM/DL
GLUCOSE BLDC GLUCOMTR-MCNC: 108 MG/DL (ref 70–99)
GLUCOSE BLDC GLUCOMTR-MCNC: 120 MG/DL (ref 70–99)
GLUCOSE BLDC GLUCOMTR-MCNC: 157 MG/DL (ref 70–99)
GLUCOSE BLDC GLUCOMTR-MCNC: 166 MG/DL (ref 70–99)
GLUCOSE SERPL-MCNC: 112 MG/DL (ref 65–99)
HBA1C MFR BLD: 4.9 % (ref 4.8–5.6)
POTASSIUM SERPL-SCNC: 4.8 MMOL/L (ref 3.5–5.2)
PROT SERPL-MCNC: 6.4 G/DL (ref 6–8.5)
SODIUM SERPL-SCNC: 140 MMOL/L (ref 136–145)

## 2022-02-25 PROCEDURE — 99254 IP/OBS CNSLTJ NEW/EST MOD 60: CPT | Performed by: INTERNAL MEDICINE

## 2022-02-25 PROCEDURE — 99232 SBSQ HOSP IP/OBS MODERATE 35: CPT | Performed by: INTERNAL MEDICINE

## 2022-02-25 PROCEDURE — 36415 COLL VENOUS BLD VENIPUNCTURE: CPT | Performed by: FAMILY MEDICINE

## 2022-02-25 PROCEDURE — 63710000001 INSULIN DETEMIR PER 5 UNITS: Performed by: INTERNAL MEDICINE

## 2022-02-25 PROCEDURE — 80053 COMPREHEN METABOLIC PANEL: CPT | Performed by: FAMILY MEDICINE

## 2022-02-25 PROCEDURE — 82140 ASSAY OF AMMONIA: CPT | Performed by: FAMILY MEDICINE

## 2022-02-25 PROCEDURE — 63710000001 INSULIN LISPRO (HUMAN) PER 5 UNITS: Performed by: FAMILY MEDICINE

## 2022-02-25 PROCEDURE — 82140 ASSAY OF AMMONIA: CPT | Performed by: INTERNAL MEDICINE

## 2022-02-25 PROCEDURE — 97161 PT EVAL LOW COMPLEX 20 MIN: CPT

## 2022-02-25 PROCEDURE — 83036 HEMOGLOBIN GLYCOSYLATED A1C: CPT | Performed by: FAMILY MEDICINE

## 2022-02-25 PROCEDURE — 82962 GLUCOSE BLOOD TEST: CPT

## 2022-02-25 PROCEDURE — 25010000002 ONDANSETRON PER 1 MG: Performed by: FAMILY MEDICINE

## 2022-02-25 RX ORDER — PROMETHAZINE HYDROCHLORIDE 12.5 MG/1
12.5 TABLET ORAL EVERY 8 HOURS PRN
Status: DISCONTINUED | OUTPATIENT
Start: 2022-02-25 | End: 2022-03-09 | Stop reason: HOSPADM

## 2022-02-25 RX ADMIN — ATORVASTATIN CALCIUM 40 MG: 40 TABLET, FILM COATED ORAL at 21:07

## 2022-02-25 RX ADMIN — BUSPIRONE HYDROCHLORIDE 5 MG: 5 TABLET ORAL at 08:33

## 2022-02-25 RX ADMIN — PANTOPRAZOLE SODIUM 40 MG: 40 TABLET, DELAYED RELEASE ORAL at 06:21

## 2022-02-25 RX ADMIN — RIFAXIMIN 550 MG: 550 TABLET ORAL at 21:07

## 2022-02-25 RX ADMIN — ONDANSETRON 4 MG: 2 INJECTION INTRAMUSCULAR; INTRAVENOUS at 12:32

## 2022-02-25 RX ADMIN — BUSPIRONE HYDROCHLORIDE 5 MG: 5 TABLET ORAL at 16:52

## 2022-02-25 RX ADMIN — LACTULOSE 20 G: 20 SOLUTION ORAL at 08:33

## 2022-02-25 RX ADMIN — LIDOCAINE 1 PATCH: 50 PATCH CUTANEOUS at 08:37

## 2022-02-25 RX ADMIN — TRAMADOL HYDROCHLORIDE 50 MG: 50 TABLET, COATED ORAL at 19:22

## 2022-02-25 RX ADMIN — LACTULOSE 20 G: 20 SOLUTION ORAL at 16:52

## 2022-02-25 RX ADMIN — LACTULOSE 20 G: 20 SOLUTION ORAL at 21:06

## 2022-02-25 RX ADMIN — SERTRALINE HYDROCHLORIDE 50 MG: 50 TABLET ORAL at 08:32

## 2022-02-25 RX ADMIN — Medication 10 ML: at 21:06

## 2022-02-25 RX ADMIN — SPIRONOLACTONE 100 MG: 100 TABLET ORAL at 21:07

## 2022-02-25 RX ADMIN — FUROSEMIDE 80 MG: 80 TABLET ORAL at 08:33

## 2022-02-25 RX ADMIN — Medication 10 ML: at 08:32

## 2022-02-25 RX ADMIN — FUROSEMIDE 80 MG: 80 TABLET ORAL at 17:47

## 2022-02-25 RX ADMIN — INSULIN LISPRO 2 UNITS: 100 INJECTION, SOLUTION INTRAVENOUS; SUBCUTANEOUS at 13:14

## 2022-02-25 RX ADMIN — RIFAXIMIN 550 MG: 550 TABLET ORAL at 08:32

## 2022-02-25 RX ADMIN — SPIRONOLACTONE 100 MG: 100 TABLET ORAL at 08:33

## 2022-02-25 RX ADMIN — AMLODIPINE BESYLATE 10 MG: 5 TABLET ORAL at 08:32

## 2022-02-25 RX ADMIN — INSULIN DETEMIR 15 UNITS: 100 INJECTION, SOLUTION SUBCUTANEOUS at 21:06

## 2022-02-25 RX ADMIN — POTASSIUM CHLORIDE 10 MEQ: 750 CAPSULE, EXTENDED RELEASE ORAL at 08:32

## 2022-02-25 RX ADMIN — TRAMADOL HYDROCHLORIDE 50 MG: 50 TABLET, COATED ORAL at 12:14

## 2022-02-25 RX ADMIN — BUSPIRONE HYDROCHLORIDE 5 MG: 5 TABLET ORAL at 21:08

## 2022-02-25 RX ADMIN — LORAZEPAM 0.5 MG: 0.5 TABLET ORAL at 03:39

## 2022-02-26 LAB
ALBUMIN SERPL-MCNC: 3 G/DL (ref 3.5–5.2)
ALBUMIN/GLOB SERPL: 1 G/DL
ALP SERPL-CCNC: 120 U/L (ref 39–117)
ALT SERPL W P-5'-P-CCNC: 21 U/L (ref 1–41)
AMMONIA BLD-SCNC: 105 UMOL/L (ref 16–60)
ANION GAP SERPL CALCULATED.3IONS-SCNC: 9.5 MMOL/L (ref 5–15)
AST SERPL-CCNC: 40 U/L (ref 1–40)
BILIRUB SERPL-MCNC: 1.5 MG/DL (ref 0–1.2)
BUN SERPL-MCNC: 18 MG/DL (ref 6–20)
BUN/CREAT SERPL: 18.6 (ref 7–25)
CALCIUM SPEC-SCNC: 9.4 MG/DL (ref 8.6–10.5)
CHLORIDE SERPL-SCNC: 103 MMOL/L (ref 98–107)
CO2 SERPL-SCNC: 26.5 MMOL/L (ref 22–29)
CREAT SERPL-MCNC: 0.97 MG/DL (ref 0.76–1.27)
DEPRECATED RDW RBC AUTO: 56.1 FL (ref 37–54)
ERYTHROCYTE [DISTWIDTH] IN BLOOD BY AUTOMATED COUNT: 16.9 % (ref 12.3–15.4)
GFR SERPL CREATININE-BSD FRML MDRD: 80 ML/MIN/1.73
GLOBULIN UR ELPH-MCNC: 3.1 GM/DL
GLUCOSE BLDC GLUCOMTR-MCNC: 177 MG/DL (ref 70–99)
GLUCOSE BLDC GLUCOMTR-MCNC: 178 MG/DL (ref 70–99)
GLUCOSE BLDC GLUCOMTR-MCNC: 217 MG/DL (ref 70–99)
GLUCOSE BLDC GLUCOMTR-MCNC: 89 MG/DL (ref 70–99)
GLUCOSE SERPL-MCNC: 93 MG/DL (ref 65–99)
HCT VFR BLD AUTO: 29.6 % (ref 37.5–51)
HGB BLD-MCNC: 10.2 G/DL (ref 13–17.7)
MAGNESIUM SERPL-MCNC: 1.7 MG/DL (ref 1.6–2.6)
MCH RBC QN AUTO: 31.5 PG (ref 26.6–33)
MCHC RBC AUTO-ENTMCNC: 34.5 G/DL (ref 31.5–35.7)
MCV RBC AUTO: 91.4 FL (ref 79–97)
PLATELET # BLD AUTO: 90 10*3/MM3 (ref 140–450)
PMV BLD AUTO: 10.6 FL (ref 6–12)
POTASSIUM SERPL-SCNC: 4.1 MMOL/L (ref 3.5–5.2)
PROT SERPL-MCNC: 6.1 G/DL (ref 6–8.5)
RBC # BLD AUTO: 3.24 10*6/MM3 (ref 4.14–5.8)
SODIUM SERPL-SCNC: 139 MMOL/L (ref 136–145)
WBC NRBC COR # BLD: 4.23 10*3/MM3 (ref 3.4–10.8)

## 2022-02-26 PROCEDURE — 63710000001 INSULIN LISPRO (HUMAN) PER 5 UNITS: Performed by: FAMILY MEDICINE

## 2022-02-26 PROCEDURE — 83735 ASSAY OF MAGNESIUM: CPT | Performed by: INTERNAL MEDICINE

## 2022-02-26 PROCEDURE — 82962 GLUCOSE BLOOD TEST: CPT

## 2022-02-26 PROCEDURE — 80053 COMPREHEN METABOLIC PANEL: CPT | Performed by: INTERNAL MEDICINE

## 2022-02-26 PROCEDURE — 85027 COMPLETE CBC AUTOMATED: CPT | Performed by: INTERNAL MEDICINE

## 2022-02-26 PROCEDURE — 82140 ASSAY OF AMMONIA: CPT | Performed by: INTERNAL MEDICINE

## 2022-02-26 PROCEDURE — 99232 SBSQ HOSP IP/OBS MODERATE 35: CPT | Performed by: INTERNAL MEDICINE

## 2022-02-26 PROCEDURE — 63710000001 INSULIN DETEMIR PER 5 UNITS: Performed by: INTERNAL MEDICINE

## 2022-02-26 RX ORDER — HYDROCODONE BITARTRATE AND ACETAMINOPHEN 5; 325 MG/1; MG/1
1 TABLET ORAL EVERY 6 HOURS PRN
Status: DISCONTINUED | OUTPATIENT
Start: 2022-02-26 | End: 2022-02-27

## 2022-02-26 RX ADMIN — SPIRONOLACTONE 100 MG: 100 TABLET ORAL at 20:38

## 2022-02-26 RX ADMIN — LACTULOSE 20 G: 20 SOLUTION ORAL at 15:16

## 2022-02-26 RX ADMIN — BUSPIRONE HYDROCHLORIDE 5 MG: 5 TABLET ORAL at 20:38

## 2022-02-26 RX ADMIN — FUROSEMIDE 80 MG: 80 TABLET ORAL at 08:41

## 2022-02-26 RX ADMIN — PANTOPRAZOLE SODIUM 40 MG: 40 TABLET, DELAYED RELEASE ORAL at 05:28

## 2022-02-26 RX ADMIN — AMLODIPINE BESYLATE 10 MG: 5 TABLET ORAL at 08:41

## 2022-02-26 RX ADMIN — INSULIN DETEMIR 15 UNITS: 100 INJECTION, SOLUTION SUBCUTANEOUS at 20:38

## 2022-02-26 RX ADMIN — LACTULOSE 20 G: 20 SOLUTION ORAL at 08:40

## 2022-02-26 RX ADMIN — Medication 10 ML: at 20:38

## 2022-02-26 RX ADMIN — RIFAXIMIN 550 MG: 550 TABLET ORAL at 20:38

## 2022-02-26 RX ADMIN — SERTRALINE HYDROCHLORIDE 50 MG: 50 TABLET ORAL at 08:41

## 2022-02-26 RX ADMIN — HYDROCODONE BITARTRATE AND ACETAMINOPHEN 1 TABLET: 5; 325 TABLET ORAL at 16:16

## 2022-02-26 RX ADMIN — SPIRONOLACTONE 100 MG: 100 TABLET ORAL at 08:41

## 2022-02-26 RX ADMIN — TRAMADOL HYDROCHLORIDE 50 MG: 50 TABLET, COATED ORAL at 11:13

## 2022-02-26 RX ADMIN — RIFAXIMIN 550 MG: 550 TABLET ORAL at 08:41

## 2022-02-26 RX ADMIN — LIDOCAINE 1 PATCH: 50 PATCH CUTANEOUS at 08:40

## 2022-02-26 RX ADMIN — INSULIN LISPRO 2 UNITS: 100 INJECTION, SOLUTION INTRAVENOUS; SUBCUTANEOUS at 12:33

## 2022-02-26 RX ADMIN — INSULIN LISPRO 3 UNITS: 100 INJECTION, SOLUTION INTRAVENOUS; SUBCUTANEOUS at 18:05

## 2022-02-26 RX ADMIN — POTASSIUM CHLORIDE 10 MEQ: 750 CAPSULE, EXTENDED RELEASE ORAL at 08:41

## 2022-02-26 RX ADMIN — BUSPIRONE HYDROCHLORIDE 5 MG: 5 TABLET ORAL at 08:41

## 2022-02-26 RX ADMIN — Medication 10 ML: at 08:41

## 2022-02-26 RX ADMIN — BUSPIRONE HYDROCHLORIDE 5 MG: 5 TABLET ORAL at 15:16

## 2022-02-26 RX ADMIN — LACTULOSE 20 G: 20 SOLUTION ORAL at 20:38

## 2022-02-26 RX ADMIN — FUROSEMIDE 80 MG: 80 TABLET ORAL at 18:05

## 2022-02-26 RX ADMIN — ATORVASTATIN CALCIUM 40 MG: 40 TABLET, FILM COATED ORAL at 20:38

## 2022-02-26 RX ADMIN — TRAMADOL HYDROCHLORIDE 50 MG: 50 TABLET, COATED ORAL at 04:29

## 2022-02-27 LAB
AMMONIA BLD-SCNC: 168 UMOL/L (ref 16–60)
ANION GAP SERPL CALCULATED.3IONS-SCNC: 8.6 MMOL/L (ref 5–15)
BUN SERPL-MCNC: 21 MG/DL (ref 6–20)
BUN/CREAT SERPL: 15.8 (ref 7–25)
CALCIUM SPEC-SCNC: 9.3 MG/DL (ref 8.6–10.5)
CHLORIDE SERPL-SCNC: 98 MMOL/L (ref 98–107)
CO2 SERPL-SCNC: 27.4 MMOL/L (ref 22–29)
CREAT SERPL-MCNC: 1.33 MG/DL (ref 0.76–1.27)
DEPRECATED RDW RBC AUTO: 56.1 FL (ref 37–54)
ERYTHROCYTE [DISTWIDTH] IN BLOOD BY AUTOMATED COUNT: 17 % (ref 12.3–15.4)
GFR SERPL CREATININE-BSD FRML MDRD: 56 ML/MIN/1.73
GLUCOSE BLDC GLUCOMTR-MCNC: 168 MG/DL (ref 70–99)
GLUCOSE BLDC GLUCOMTR-MCNC: 188 MG/DL (ref 70–99)
GLUCOSE BLDC GLUCOMTR-MCNC: 240 MG/DL (ref 70–99)
GLUCOSE BLDC GLUCOMTR-MCNC: 266 MG/DL (ref 70–99)
GLUCOSE SERPL-MCNC: 160 MG/DL (ref 65–99)
HCT VFR BLD AUTO: 28.8 % (ref 37.5–51)
HGB BLD-MCNC: 10 G/DL (ref 13–17.7)
MAGNESIUM SERPL-MCNC: 1.6 MG/DL (ref 1.6–2.6)
MCH RBC QN AUTO: 31.7 PG (ref 26.6–33)
MCHC RBC AUTO-ENTMCNC: 34.7 G/DL (ref 31.5–35.7)
MCV RBC AUTO: 91.4 FL (ref 79–97)
PLATELET # BLD AUTO: 90 10*3/MM3 (ref 140–450)
PMV BLD AUTO: 10.9 FL (ref 6–12)
POTASSIUM SERPL-SCNC: 4.4 MMOL/L (ref 3.5–5.2)
RBC # BLD AUTO: 3.15 10*6/MM3 (ref 4.14–5.8)
SODIUM SERPL-SCNC: 134 MMOL/L (ref 136–145)
WBC NRBC COR # BLD: 4.64 10*3/MM3 (ref 3.4–10.8)

## 2022-02-27 PROCEDURE — 99232 SBSQ HOSP IP/OBS MODERATE 35: CPT | Performed by: INTERNAL MEDICINE

## 2022-02-27 PROCEDURE — 80048 BASIC METABOLIC PNL TOTAL CA: CPT | Performed by: INTERNAL MEDICINE

## 2022-02-27 PROCEDURE — 97530 THERAPEUTIC ACTIVITIES: CPT

## 2022-02-27 PROCEDURE — 85027 COMPLETE CBC AUTOMATED: CPT | Performed by: INTERNAL MEDICINE

## 2022-02-27 PROCEDURE — 83735 ASSAY OF MAGNESIUM: CPT | Performed by: INTERNAL MEDICINE

## 2022-02-27 PROCEDURE — 25010000002 ONDANSETRON PER 1 MG: Performed by: FAMILY MEDICINE

## 2022-02-27 PROCEDURE — 63710000001 INSULIN DETEMIR PER 5 UNITS: Performed by: INTERNAL MEDICINE

## 2022-02-27 PROCEDURE — 63710000001 INSULIN LISPRO (HUMAN) PER 5 UNITS: Performed by: FAMILY MEDICINE

## 2022-02-27 PROCEDURE — 82140 ASSAY OF AMMONIA: CPT | Performed by: INTERNAL MEDICINE

## 2022-02-27 PROCEDURE — 82962 GLUCOSE BLOOD TEST: CPT

## 2022-02-27 PROCEDURE — 97110 THERAPEUTIC EXERCISES: CPT

## 2022-02-27 RX ADMIN — ONDANSETRON 4 MG: 2 INJECTION INTRAMUSCULAR; INTRAVENOUS at 21:14

## 2022-02-27 RX ADMIN — TRAMADOL HYDROCHLORIDE 50 MG: 50 TABLET, COATED ORAL at 13:08

## 2022-02-27 RX ADMIN — LACTULOSE 20 G: 20 SOLUTION ORAL at 09:24

## 2022-02-27 RX ADMIN — SPIRONOLACTONE 100 MG: 100 TABLET ORAL at 20:18

## 2022-02-27 RX ADMIN — HYDROCODONE BITARTRATE AND ACETAMINOPHEN 1 TABLET: 5; 325 TABLET ORAL at 06:53

## 2022-02-27 RX ADMIN — RIFAXIMIN 550 MG: 550 TABLET ORAL at 09:24

## 2022-02-27 RX ADMIN — TRAMADOL HYDROCHLORIDE 50 MG: 50 TABLET, COATED ORAL at 20:16

## 2022-02-27 RX ADMIN — BUSPIRONE HYDROCHLORIDE 5 MG: 5 TABLET ORAL at 09:23

## 2022-02-27 RX ADMIN — BUSPIRONE HYDROCHLORIDE 5 MG: 5 TABLET ORAL at 15:47

## 2022-02-27 RX ADMIN — LIDOCAINE 1 PATCH: 50 PATCH CUTANEOUS at 09:24

## 2022-02-27 RX ADMIN — ATORVASTATIN CALCIUM 40 MG: 40 TABLET, FILM COATED ORAL at 20:17

## 2022-02-27 RX ADMIN — BUSPIRONE HYDROCHLORIDE 5 MG: 5 TABLET ORAL at 20:17

## 2022-02-27 RX ADMIN — INSULIN LISPRO 3 UNITS: 100 INJECTION, SOLUTION INTRAVENOUS; SUBCUTANEOUS at 12:06

## 2022-02-27 RX ADMIN — Medication 10 ML: at 09:24

## 2022-02-27 RX ADMIN — LACTULOSE 20 G: 20 SOLUTION ORAL at 15:47

## 2022-02-27 RX ADMIN — SERTRALINE HYDROCHLORIDE 50 MG: 50 TABLET ORAL at 09:23

## 2022-02-27 RX ADMIN — PANTOPRAZOLE SODIUM 40 MG: 40 TABLET, DELAYED RELEASE ORAL at 05:09

## 2022-02-27 RX ADMIN — Medication 10 ML: at 20:17

## 2022-02-27 RX ADMIN — POTASSIUM CHLORIDE 10 MEQ: 750 CAPSULE, EXTENDED RELEASE ORAL at 09:24

## 2022-02-27 RX ADMIN — INSULIN LISPRO 4 UNITS: 100 INJECTION, SOLUTION INTRAVENOUS; SUBCUTANEOUS at 17:51

## 2022-02-27 RX ADMIN — FUROSEMIDE 80 MG: 80 TABLET ORAL at 09:26

## 2022-02-27 RX ADMIN — LACTULOSE 20 G: 20 SOLUTION ORAL at 20:17

## 2022-02-27 RX ADMIN — FUROSEMIDE 80 MG: 80 TABLET ORAL at 17:52

## 2022-02-27 RX ADMIN — INSULIN DETEMIR 15 UNITS: 100 INJECTION, SOLUTION SUBCUTANEOUS at 20:17

## 2022-02-27 RX ADMIN — HYDROCODONE BITARTRATE AND ACETAMINOPHEN 1 TABLET: 5; 325 TABLET ORAL at 00:52

## 2022-02-27 RX ADMIN — SPIRONOLACTONE 100 MG: 100 TABLET ORAL at 09:23

## 2022-02-27 RX ADMIN — AMLODIPINE BESYLATE 10 MG: 5 TABLET ORAL at 09:24

## 2022-02-27 RX ADMIN — RIFAXIMIN 550 MG: 550 TABLET ORAL at 20:17

## 2022-02-28 ENCOUNTER — PATIENT OUTREACH (OUTPATIENT)
Dept: CASE MANAGEMENT | Facility: OTHER | Age: 57
End: 2022-02-28

## 2022-02-28 DIAGNOSIS — I10 HYPERTENSION, UNSPECIFIED TYPE: ICD-10-CM

## 2022-02-28 DIAGNOSIS — I63.9 CEREBROVASCULAR ACCIDENT (CVA), UNSPECIFIED MECHANISM: Primary | ICD-10-CM

## 2022-02-28 DIAGNOSIS — K76.82 HEPATIC ENCEPHALOPATHY: ICD-10-CM

## 2022-02-28 LAB
AMMONIA BLD-SCNC: 173 UMOL/L (ref 16–60)
ANION GAP SERPL CALCULATED.3IONS-SCNC: 9.7 MMOL/L (ref 5–15)
BUN SERPL-MCNC: 22 MG/DL (ref 6–20)
BUN/CREAT SERPL: 17.7 (ref 7–25)
CALCIUM SPEC-SCNC: 9.3 MG/DL (ref 8.6–10.5)
CHLORIDE SERPL-SCNC: 96 MMOL/L (ref 98–107)
CO2 SERPL-SCNC: 27.3 MMOL/L (ref 22–29)
CREAT SERPL-MCNC: 1.24 MG/DL (ref 0.76–1.27)
DEPRECATED RDW RBC AUTO: 56.6 FL (ref 37–54)
ERYTHROCYTE [DISTWIDTH] IN BLOOD BY AUTOMATED COUNT: 16.9 % (ref 12.3–15.4)
GFR SERPL CREATININE-BSD FRML MDRD: 61 ML/MIN/1.73
GLUCOSE BLDC GLUCOMTR-MCNC: 158 MG/DL (ref 70–99)
GLUCOSE BLDC GLUCOMTR-MCNC: 159 MG/DL (ref 70–99)
GLUCOSE BLDC GLUCOMTR-MCNC: 174 MG/DL (ref 70–99)
GLUCOSE BLDC GLUCOMTR-MCNC: 199 MG/DL (ref 70–99)
GLUCOSE SERPL-MCNC: 169 MG/DL (ref 65–99)
HCT VFR BLD AUTO: 30.8 % (ref 37.5–51)
HGB BLD-MCNC: 10.9 G/DL (ref 13–17.7)
MAGNESIUM SERPL-MCNC: 1.7 MG/DL (ref 1.6–2.6)
MCH RBC QN AUTO: 32.3 PG (ref 26.6–33)
MCHC RBC AUTO-ENTMCNC: 35.4 G/DL (ref 31.5–35.7)
MCV RBC AUTO: 91.4 FL (ref 79–97)
PLATELET # BLD AUTO: 107 10*3/MM3 (ref 140–450)
PMV BLD AUTO: 11.6 FL (ref 6–12)
POTASSIUM SERPL-SCNC: 4.3 MMOL/L (ref 3.5–5.2)
RBC # BLD AUTO: 3.37 10*6/MM3 (ref 4.14–5.8)
SODIUM SERPL-SCNC: 133 MMOL/L (ref 136–145)
WBC NRBC COR # BLD: 6.22 10*3/MM3 (ref 3.4–10.8)

## 2022-02-28 PROCEDURE — 63710000001 PROMETHAZINE PER 12.5 MG: Performed by: INTERNAL MEDICINE

## 2022-02-28 PROCEDURE — 80048 BASIC METABOLIC PNL TOTAL CA: CPT | Performed by: INTERNAL MEDICINE

## 2022-02-28 PROCEDURE — 82962 GLUCOSE BLOOD TEST: CPT

## 2022-02-28 PROCEDURE — 99232 SBSQ HOSP IP/OBS MODERATE 35: CPT | Performed by: INTERNAL MEDICINE

## 2022-02-28 PROCEDURE — 83735 ASSAY OF MAGNESIUM: CPT | Performed by: INTERNAL MEDICINE

## 2022-02-28 PROCEDURE — 82140 ASSAY OF AMMONIA: CPT | Performed by: INTERNAL MEDICINE

## 2022-02-28 PROCEDURE — 63710000001 INSULIN LISPRO (HUMAN) PER 5 UNITS: Performed by: FAMILY MEDICINE

## 2022-02-28 PROCEDURE — 25010000002 MORPHINE PER 10 MG: Performed by: GENERAL PRACTICE

## 2022-02-28 PROCEDURE — 63710000001 INSULIN DETEMIR PER 5 UNITS: Performed by: INTERNAL MEDICINE

## 2022-02-28 PROCEDURE — 85027 COMPLETE CBC AUTOMATED: CPT | Performed by: INTERNAL MEDICINE

## 2022-02-28 RX ORDER — LACTULOSE 10 G/15ML
30 SOLUTION ORAL 3 TIMES DAILY
Status: DISCONTINUED | OUTPATIENT
Start: 2022-02-28 | End: 2022-03-03

## 2022-02-28 RX ORDER — MORPHINE SULFATE 2 MG/ML
1 INJECTION, SOLUTION INTRAMUSCULAR; INTRAVENOUS ONCE
Status: COMPLETED | OUTPATIENT
Start: 2022-02-28 | End: 2022-02-28

## 2022-02-28 RX ADMIN — TRAMADOL HYDROCHLORIDE 50 MG: 50 TABLET, COATED ORAL at 18:35

## 2022-02-28 RX ADMIN — PROMETHAZINE HYDROCHLORIDE 12.5 MG: 12.5 TABLET ORAL at 11:52

## 2022-02-28 RX ADMIN — SERTRALINE HYDROCHLORIDE 50 MG: 50 TABLET ORAL at 08:20

## 2022-02-28 RX ADMIN — Medication 10 ML: at 08:21

## 2022-02-28 RX ADMIN — FUROSEMIDE 80 MG: 80 TABLET ORAL at 17:36

## 2022-02-28 RX ADMIN — MORPHINE SULFATE 1 MG: 2 INJECTION, SOLUTION INTRAMUSCULAR; INTRAVENOUS at 23:11

## 2022-02-28 RX ADMIN — LACTULOSE 30 G: 20 SOLUTION ORAL at 21:39

## 2022-02-28 RX ADMIN — FUROSEMIDE 80 MG: 80 TABLET ORAL at 08:20

## 2022-02-28 RX ADMIN — TRAMADOL HYDROCHLORIDE 50 MG: 50 TABLET, COATED ORAL at 11:51

## 2022-02-28 RX ADMIN — AMLODIPINE BESYLATE 10 MG: 5 TABLET ORAL at 08:21

## 2022-02-28 RX ADMIN — Medication 10 ML: at 21:46

## 2022-02-28 RX ADMIN — INSULIN DETEMIR 15 UNITS: 100 INJECTION, SOLUTION SUBCUTANEOUS at 21:39

## 2022-02-28 RX ADMIN — ATORVASTATIN CALCIUM 40 MG: 40 TABLET, FILM COATED ORAL at 21:39

## 2022-02-28 RX ADMIN — POTASSIUM CHLORIDE 10 MEQ: 750 CAPSULE, EXTENDED RELEASE ORAL at 08:21

## 2022-02-28 RX ADMIN — BUSPIRONE HYDROCHLORIDE 5 MG: 5 TABLET ORAL at 15:26

## 2022-02-28 RX ADMIN — SPIRONOLACTONE 100 MG: 100 TABLET ORAL at 08:20

## 2022-02-28 RX ADMIN — SPIRONOLACTONE 100 MG: 100 TABLET ORAL at 21:45

## 2022-02-28 RX ADMIN — RIFAXIMIN 550 MG: 550 TABLET ORAL at 08:20

## 2022-02-28 RX ADMIN — PANTOPRAZOLE SODIUM 40 MG: 40 TABLET, DELAYED RELEASE ORAL at 05:39

## 2022-02-28 RX ADMIN — BUSPIRONE HYDROCHLORIDE 5 MG: 5 TABLET ORAL at 21:45

## 2022-02-28 RX ADMIN — LACTULOSE 20 G: 20 SOLUTION ORAL at 08:20

## 2022-02-28 RX ADMIN — INSULIN LISPRO 2 UNITS: 100 INJECTION, SOLUTION INTRAVENOUS; SUBCUTANEOUS at 08:20

## 2022-02-28 RX ADMIN — BUSPIRONE HYDROCHLORIDE 5 MG: 5 TABLET ORAL at 08:20

## 2022-02-28 RX ADMIN — LACTULOSE 30 G: 20 SOLUTION ORAL at 15:26

## 2022-02-28 RX ADMIN — INSULIN LISPRO 2 UNITS: 100 INJECTION, SOLUTION INTRAVENOUS; SUBCUTANEOUS at 12:09

## 2022-02-28 RX ADMIN — RIFAXIMIN 550 MG: 550 TABLET ORAL at 21:45

## 2022-02-28 NOTE — OUTREACH NOTE
Ambulatory Case Management Note    CCM Interim Update    [facility] End of Month Documentation    This Chronic Medical Management Care Plan for Nic Nicolas, 56 y.o. male, has been monitored and managed; reviewed; established and a new plan of care implemented for the month of February.  A cumulative time of 204  minutes was spent on this patient record this month, including chart review; electronic communication with other providers; phone call with other providers; phone call with patient; phone call with primary care provider; phone call with pharmacist; electronic communication with primary care provider, [place] calls to PCP, pain MGT, Pharmacy, Patient , Socail work.    Regarding the patient's problems: has Arthritis, senescent; Body mass index (BMI) 40.0-44.9, adult (HCC); Colon polyps; Cirrhosis (HCC); Multiple episodes of deep venous thrombosis (HCC); High blood pressure; Hyperlipidemia; Pancytopenia (HCC); Sleep apnea; Systolic congestive heart failure (HCC); Bilateral lower extremity edema; Chronic cystitis; Chronic low back pain; Diabetes mellitus without complication (HCC); Acid reflux; Acetabular fracture (HCC); Gross hematuria; Hesitancy of micturition; Gastrointestinal hemorrhage associated with peptic ulcer; Anxiety; Hepatic encephalopathy (HCC); Stroke (HCC); and Thrombocytopenia (HCC) on their problem list., the following items were addressed: medical records; medications; referrals to community service providers, Patient admitted , follow up and any changes can be found within the plan section of the note.  A detailed listing of time spent for chronic care management is tracked within each outreach encounter.  Current medications include:  has a current medication list which includes the following prescription(s): albuterol sulfate hfa, amlodipine, atorvastatin, buspirone, furosemide, insulin glargine, lactulose, lidocaine, omeprazole, ondansetron odt, potassium, rifaximin, sertraline,  spironolactone, and vitamin b-2, and the following Facility-Administered Medications: amlodipine, atorvastatin, buspirone, dextrose, dextrose, furosemide, glucagon (human recombinant), insulin detemir, insulin lispro, lactulose, lidocaine, ondansetron, pantoprazole, potassium chloride, promethazine, rifaximin, sertraline, sodium chloride, sodium chloride, sodium chloride, spironolactone, and tramadol. and the patient is reported to be patient is compliant with medication protocol, Complient with meds for DM2 only,  Medications are reported to be effective, for A1c reduction.   All notes on chart for PCP to review.    The patient was monitored remotely for blood pressure; blood glucose; mood & behavior; pain, poly diagnosis.    The patient's physical needs include:  help taking medications as prescribed; needs assistance with ADLs; resources for disability needs; physical healthcare; medication education; physician referral.     The patient's mental support needs include:  increased support; coordination of community providers    The patient's cognitive support needs include:  increased support; medication; health care; emotional care; supervision    The patient's psychosocial support needs include:  need for increased support    The patient's functional needs include: physician referral; medication education; resources for disability needs    The patient's environmental needs include:  no involvement in outside activities or no access to other activities; no access to transportation, N/A    Care Plan overall comments:  On going eval    Refer to previous outreach notes for more information on the areas listed above.    Monthly Billing Diagnoses  (I63.9) Cerebrovascular accident (CVA), unspecified mechanism (HCC)    (K72.90) Hepatic encephalopathy (HCC)    (I10) Hypertension, unspecified type    Medications   · Medications have been reconciled    Care Plan progress this month:      Recently Modified Care Plans Updates  made since 1/28/2022 12:00 AM    No recently modified care plans.          · Current Specialty Plan of Care Status in process    Instructions   · Patient was provided an electronic copy of care plan  · CCM services were explained and offered and patient has accepted these services.  · Patient has given their written consent to receive CCM services and understands that this includes the authorization of electronic communication of medical information with the other treating providers.  · Patient understands that they may stop CCM services at any time and these changes will be effective at the end of the calendar month and will effectively revocate the agreement of CCM services.  · Patient understands that only one practitioner can furnish and be paid for CCM services during one calendar month.  Patient also understands that there may be co-payment or deductible fees in association with CCM services.  · Patient will continue with at least monthly follow-up calls with the Nurse Navigator.    Fili Davies MA  Ambulatory Case Management    2/28/2022, 08:47 EST

## 2022-03-01 LAB
AMMONIA BLD-SCNC: 102 UMOL/L (ref 16–60)
GLUCOSE BLDC GLUCOMTR-MCNC: 147 MG/DL (ref 70–99)
GLUCOSE BLDC GLUCOMTR-MCNC: 165 MG/DL (ref 70–99)
GLUCOSE BLDC GLUCOMTR-MCNC: 210 MG/DL (ref 70–99)
GLUCOSE BLDC GLUCOMTR-MCNC: 240 MG/DL (ref 70–99)

## 2022-03-01 PROCEDURE — 63710000001 INSULIN DETEMIR PER 5 UNITS: Performed by: INTERNAL MEDICINE

## 2022-03-01 PROCEDURE — 82140 ASSAY OF AMMONIA: CPT | Performed by: INTERNAL MEDICINE

## 2022-03-01 PROCEDURE — 63710000001 INSULIN LISPRO (HUMAN) PER 5 UNITS: Performed by: FAMILY MEDICINE

## 2022-03-01 PROCEDURE — 99233 SBSQ HOSP IP/OBS HIGH 50: CPT | Performed by: INTERNAL MEDICINE

## 2022-03-01 PROCEDURE — 82962 GLUCOSE BLOOD TEST: CPT

## 2022-03-01 PROCEDURE — 63710000001 PROMETHAZINE PER 12.5 MG: Performed by: INTERNAL MEDICINE

## 2022-03-01 RX ORDER — OXYCODONE HYDROCHLORIDE 5 MG/1
10 TABLET ORAL EVERY 6 HOURS PRN
Status: DISCONTINUED | OUTPATIENT
Start: 2022-03-01 | End: 2022-03-02

## 2022-03-01 RX ORDER — OXYCODONE HYDROCHLORIDE 5 MG/1
10 TABLET ORAL EVERY 4 HOURS PRN
Status: DISCONTINUED | OUTPATIENT
Start: 2022-03-01 | End: 2022-03-01

## 2022-03-01 RX ADMIN — INSULIN DETEMIR 15 UNITS: 100 INJECTION, SOLUTION SUBCUTANEOUS at 21:21

## 2022-03-01 RX ADMIN — FUROSEMIDE 80 MG: 80 TABLET ORAL at 17:54

## 2022-03-01 RX ADMIN — OXYCODONE HYDROCHLORIDE 10 MG: 5 TABLET ORAL at 18:40

## 2022-03-01 RX ADMIN — LACTULOSE 30 G: 20 SOLUTION ORAL at 08:46

## 2022-03-01 RX ADMIN — OXYCODONE HYDROCHLORIDE 10 MG: 5 TABLET ORAL at 13:00

## 2022-03-01 RX ADMIN — PANTOPRAZOLE SODIUM 40 MG: 40 TABLET, DELAYED RELEASE ORAL at 05:38

## 2022-03-01 RX ADMIN — BUSPIRONE HYDROCHLORIDE 5 MG: 5 TABLET ORAL at 21:21

## 2022-03-01 RX ADMIN — ATORVASTATIN CALCIUM 40 MG: 40 TABLET, FILM COATED ORAL at 21:21

## 2022-03-01 RX ADMIN — LACTULOSE 30 G: 20 SOLUTION ORAL at 16:55

## 2022-03-01 RX ADMIN — SPIRONOLACTONE 100 MG: 100 TABLET ORAL at 08:46

## 2022-03-01 RX ADMIN — SERTRALINE HYDROCHLORIDE 50 MG: 50 TABLET ORAL at 08:47

## 2022-03-01 RX ADMIN — LACTULOSE 30 G: 20 SOLUTION ORAL at 21:21

## 2022-03-01 RX ADMIN — FUROSEMIDE 80 MG: 80 TABLET ORAL at 08:47

## 2022-03-01 RX ADMIN — BUSPIRONE HYDROCHLORIDE 5 MG: 5 TABLET ORAL at 08:47

## 2022-03-01 RX ADMIN — SPIRONOLACTONE 100 MG: 100 TABLET ORAL at 21:21

## 2022-03-01 RX ADMIN — PROMETHAZINE HYDROCHLORIDE 12.5 MG: 12.5 TABLET ORAL at 09:18

## 2022-03-01 RX ADMIN — INSULIN LISPRO 3 UNITS: 100 INJECTION, SOLUTION INTRAVENOUS; SUBCUTANEOUS at 17:53

## 2022-03-01 RX ADMIN — Medication 10 ML: at 21:21

## 2022-03-01 RX ADMIN — INSULIN LISPRO 3 UNITS: 100 INJECTION, SOLUTION INTRAVENOUS; SUBCUTANEOUS at 13:00

## 2022-03-01 RX ADMIN — BUSPIRONE HYDROCHLORIDE 5 MG: 5 TABLET ORAL at 16:55

## 2022-03-01 RX ADMIN — TRAMADOL HYDROCHLORIDE 50 MG: 50 TABLET, COATED ORAL at 05:38

## 2022-03-01 RX ADMIN — AMLODIPINE BESYLATE 10 MG: 5 TABLET ORAL at 08:46

## 2022-03-01 RX ADMIN — RIFAXIMIN 550 MG: 550 TABLET ORAL at 08:46

## 2022-03-01 RX ADMIN — POTASSIUM CHLORIDE 10 MEQ: 750 CAPSULE, EXTENDED RELEASE ORAL at 08:46

## 2022-03-01 NOTE — CONSULTS
RT CM consulted for possible need for cpap. Pt reported to hospitalist that he had a PSG at one time but never received a CPAP and does not know why. RT CM was not able to locate a PSG in [facility] medical record. Case management notes state pt will likely discharge to nursing facility for long term placement.  If so, facility will be responsible for providing pt's CPAP.  RT CM recommends Auto CPAP order to be sent to facility on discharge if CPAP is needed.  Overnight oximetry ordered; RT CM will review and continue to follow.

## 2022-03-01 NOTE — PROGRESS NOTES
[facility]   Hospitalist Progress Note  Date: 3/1/2022  Patient Name: Nic Nicolas  : 1965  MRN: [id number]  Date of admission: 2022      Subjective   Subjective     Chief Complaint:  Confusion   Summary: 56 y.o. male with a past history of morbid obesity, Riuz cirrhosis, hepatic encephalopathy due to hyperammonemia, obstructive sleep apnea not being treated, insulin-dependent diabetes mellitus, GERD and history of GI bleeding, history of DVT and anxious depression.  He lives alone but is brought to the hospital by his sister with concerns that he has symptoms similar to those seen in the past when he had elevated ammonia levels.  His emergency department evaluation is negative for evidence of a stroke but he does have hyperammonemia with ammonia level of 183.  He is also noted to be very weak and thought unlikely to be able to manage living alone.  The Hospitalist service is asked to admit him for treatment of his hyperammonemia and evaluation for possible rehabilitation.    Interval Followup: No acute events overnight.  Remains at his baseline mentation.  The patient is fixated on his back, shoulder, and neck pain.  He is repeatedly asking for narcotics.  He has been on pain medications for 20 years and states the pain is unbearable.  He has been unable to sleep due to the pain.    Review of Systems  All systems reviewed and negative unless otherwise stated under interval follow-up    Objective   Objective     Vitals:   Temp:  [97.2 °F (36.2 °C)-98.2 °F (36.8 °C)] 98.2 °F (36.8 °C)  Heart Rate:  [] 112  Resp:  [18-20] 20  BP: (106-161)/(57-84) 133/62  Physical Exam     Constitutional: Awake, alert, appears uncomfortable   Eyes: Pupils equal, sclerae anicteric, no conjunctival injection   HENT: NCAT, mucous membranes moist   Neck: Supple, no LAD   Respiratory: Clear to auscultation bilaterally, nonlabored respirations no w/r/r    Cardiovascular: RRR, no murmurs,   Gastrointestinal:  Positive bowel sounds, soft, nontender, nondistended   Musculoskeletal: No bilateral ankle edema, no clubbing or cyanosis to extremities   Psychiatric: Appropriate affect, cooperative   Neurologic: Oriented x 3, strength symmetric in all extremities, Cranial Nerves grossly intact to confrontation, speech clear   Skin: No rashes     Result Review    Result Review:  I have personally reviewed the results from the time of this admission to 3/1/2022 17:01 EST and agree with these findings:  [x]  Laboratory all labs personally reviewed  [x]  Microbiology  [x]  Radiology   [x]  EKG/Telemetry personally reviewed showing normal sinus rhythm with intermittent sinus tachycardia  []  Cardiology/Vascular   []  Pathology  []  Old records  []  Other:    Assessment/Plan   Assessment / Plan     Assessment/Plan:  Hepatic encephalopathy secondary to hyperammonemia  ROMO cirrhosis  Weakness/debility  History of DVT  IDDM  Hypertension  BREE  GERD  Anxiety  Depression  Chronic pain syndrome  History of TBI    Continue to monitor in the hospital for management of the above  Will discontinue tramadol, start oxycodone 10 mg every 6 hours as needed for severe pain.  Monitor vital signs closely while on narcotics  Have informed him we will not be prescribing this at discharge but will use in the short-term  Continue with Lasix 80 mg oral twice daily as well as Aldactone  Continue lactulose 30 G 3 times daily  Order overnight oximetry and consult RT   Continue appropriate home medications  Trend renal function and electrolytes with a.m. CMP  Trend Hgb and WBC with a.m. CBC     Discussed plan with RN.    DVT prophylaxis:  Mechanical DVT prophylaxis orders are present.    CODE STATUS:   Level Of Support Discussed With: Patient  Code Status (Patient has no pulse and is not breathing): CPR (Attempt to Resuscitate)  Medical Interventions (Patient has pulse or is breathing): Full Support

## 2022-03-01 NOTE — PLAN OF CARE
Goal Outcome Evaluation:  Plan of Care Reviewed With: patient        Progress: no change  Outcome Summary: PT C/O 10/10 BACK PAIN. TOLERATED ONE-TIME MORPHINE DOSE WELL. PT ALERT AND ORIENTED X4 AT THIS TIME. WILL CONTINUE TO MONITOR

## 2022-03-01 NOTE — PLAN OF CARE
Problem: Adult Inpatient Plan of Care  Goal: Plan of Care Review  Outcome: Ongoing, Progressing  Flowsheets (Taken 3/1/2022 1409)  Outcome Summary: Pain management issues addressed with MD. Oxycodone ordered. Alert and oriented. Up in chair.  Goal: Patient-Specific Goal (Individualized)  Outcome: Ongoing, Progressing  Goal: Absence of Hospital-Acquired Illness or Injury  Outcome: Ongoing, Progressing  Intervention: Identify and Manage Fall Risk  Recent Flowsheet Documentation  Taken 3/1/2022 0919 by Stephanie Roa RN  Safety Promotion/Fall Prevention: safety round/check completed  Intervention: Prevent Skin Injury  Recent Flowsheet Documentation  Taken 3/1/2022 0919 by Stephanie Roa RN  Skin Protection: tubing/devices free from skin contact  Goal: Optimal Comfort and Wellbeing  Outcome: Ongoing, Progressing  Goal: Readiness for Transition of Care  Outcome: Ongoing, Progressing     Problem: Diabetes Comorbidity  Goal: Blood Glucose Level Within Desired Range  Outcome: Ongoing, Progressing  Intervention: Maintain Glycemic Control  Recent Flowsheet Documentation  Taken 3/1/2022 0919 by Stephanie Roa RN  Glycemic Management: blood glucose monitoring     Problem: Hypertension Comorbidity  Goal: Blood Pressure in Desired Range  Outcome: Ongoing, Progressing     Problem: Obstructive Sleep Apnea Risk or Actual (Comorbidity Management)  Goal: Unobstructed Breathing During Sleep  Outcome: Ongoing, Progressing     Problem: Pain Chronic (Persistent) (Comorbidity Management)  Goal: Acceptable Pain Control and Functional Ability  Outcome: Ongoing, Progressing  Intervention: Manage Persistent Pain  Recent Flowsheet Documentation  Taken 3/1/2022 0919 by Stephanie Roa RN  Bowel Elimination Promotion: commode/bedpan at bedside     Problem: Skin Injury Risk Increased  Goal: Skin Health and Integrity  Outcome: Ongoing, Progressing  Intervention: Optimize Skin Protection  Recent Flowsheet Documentation  Taken 3/1/2022  0919 by Stephanie Roa, RN  Skin Protection: tubing/devices free from skin contact     Problem: Fall Injury Risk  Goal: Absence of Fall and Fall-Related Injury  Outcome: Ongoing, Progressing  Intervention: Promote Injury-Free Environment  Recent Flowsheet Documentation  Taken 3/1/2022 0919 by Stephanie Roa, RN  Safety Promotion/Fall Prevention: safety round/check completed   Goal Outcome Evaluation:              Outcome Summary: Pain management issues addressed with MD. Oxycodone ordered. Alert and oriented. Up in chair.

## 2022-03-02 ENCOUNTER — APPOINTMENT (OUTPATIENT)
Dept: NEUROLOGY | Facility: HOSPITAL | Age: 57
End: 2022-03-02

## 2022-03-02 ENCOUNTER — APPOINTMENT (OUTPATIENT)
Dept: CT IMAGING | Facility: HOSPITAL | Age: 57
End: 2022-03-02

## 2022-03-02 ENCOUNTER — APPOINTMENT (OUTPATIENT)
Dept: CARDIOLOGY | Facility: HOSPITAL | Age: 57
End: 2022-03-02

## 2022-03-02 LAB
ALBUMIN SERPL-MCNC: 3.1 G/DL (ref 3.5–5.2)
ALBUMIN/GLOB SERPL: 0.8 G/DL
ALP SERPL-CCNC: 135 U/L (ref 39–117)
ALT SERPL W P-5'-P-CCNC: 23 U/L (ref 1–41)
AMMONIA BLD-SCNC: 128 UMOL/L (ref 16–60)
ANION GAP SERPL CALCULATED.3IONS-SCNC: 10 MMOL/L (ref 5–15)
AST SERPL-CCNC: 42 U/L (ref 1–40)
BASOPHILS # BLD AUTO: 0.06 10*3/MM3 (ref 0–0.2)
BASOPHILS NFR BLD AUTO: 1 % (ref 0–1.5)
BH CV XLRA MEAS LEFT CAROTID BULB EDV: 28 CM/SEC
BH CV XLRA MEAS LEFT CAROTID BULB PSV: 163 CM/SEC
BH CV XLRA MEAS LEFT DIST CCA EDV: 22 CM/SEC
BH CV XLRA MEAS LEFT DIST CCA PSV: 139 CM/SEC
BH CV XLRA MEAS LEFT DIST ICA EDV: 18 CM/SEC
BH CV XLRA MEAS LEFT DIST ICA PSV: 82 CM/SEC
BH CV XLRA MEAS LEFT MID ICA EDV: 18 CM/SEC
BH CV XLRA MEAS LEFT MID ICA PSV: 87 CM/SEC
BH CV XLRA MEAS LEFT PROX CCA EDV: 27 CM/SEC
BH CV XLRA MEAS LEFT PROX CCA PSV: 145 CM/SEC
BH CV XLRA MEAS LEFT PROX ECA EDV: 12 CM/SEC
BH CV XLRA MEAS LEFT PROX ECA PSV: 119 CM/SEC
BH CV XLRA MEAS LEFT PROX ICA EDV: 24 CM/SEC
BH CV XLRA MEAS LEFT PROX ICA PSV: 83 CM/SEC
BH CV XLRA MEAS LEFT VERTEBRAL A EDV: 16 CM/SEC
BH CV XLRA MEAS LEFT VERTEBRAL A PSV: 74 CM/SEC
BH CV XLRA MEAS RIGHT CAROTID BULB EDV: 7 CM/SEC
BH CV XLRA MEAS RIGHT CAROTID BULB PSV: 83 CM/SEC
BH CV XLRA MEAS RIGHT DIST CCA EDV: 19 CM/SEC
BH CV XLRA MEAS RIGHT DIST CCA PSV: 159 CM/SEC
BH CV XLRA MEAS RIGHT DIST ICA EDV: 30 CM/SEC
BH CV XLRA MEAS RIGHT DIST ICA PSV: 141 CM/SEC
BH CV XLRA MEAS RIGHT MID ICA EDV: 34 CM/SEC
BH CV XLRA MEAS RIGHT MID ICA PSV: 197 CM/SEC
BH CV XLRA MEAS RIGHT PROX CCA EDV: 19 CM/SEC
BH CV XLRA MEAS RIGHT PROX CCA PSV: 165 CM/SEC
BH CV XLRA MEAS RIGHT PROX ECA EDV: 17 CM/SEC
BH CV XLRA MEAS RIGHT PROX ECA PSV: 155 CM/SEC
BH CV XLRA MEAS RIGHT PROX ICA EDV: 18 CM/SEC
BH CV XLRA MEAS RIGHT PROX ICA PSV: 78 CM/SEC
BH CV XLRA MEAS RIGHT VERTEBRAL A EDV: 16 CM/SEC
BH CV XLRA MEAS RIGHT VERTEBRAL A PSV: 68 CM/SEC
BILIRUB SERPL-MCNC: 1.5 MG/DL (ref 0–1.2)
BUN SERPL-MCNC: 27 MG/DL (ref 6–20)
BUN/CREAT SERPL: 21.3 (ref 7–25)
CALCIUM SPEC-SCNC: 9.5 MG/DL (ref 8.6–10.5)
CHLORIDE SERPL-SCNC: 94 MMOL/L (ref 98–107)
CO2 SERPL-SCNC: 26 MMOL/L (ref 22–29)
CREAT SERPL-MCNC: 1.27 MG/DL (ref 0.76–1.27)
DEPRECATED RDW RBC AUTO: 55.4 FL (ref 37–54)
EGFRCR SERPLBLD CKD-EPI 2021: 66.7 ML/MIN/1.73
EOSINOPHIL # BLD AUTO: 0.33 10*3/MM3 (ref 0–0.4)
EOSINOPHIL NFR BLD AUTO: 5.5 % (ref 0.3–6.2)
ERYTHROCYTE [DISTWIDTH] IN BLOOD BY AUTOMATED COUNT: 16.5 % (ref 12.3–15.4)
GLOBULIN UR ELPH-MCNC: 3.8 GM/DL
GLUCOSE BLDC GLUCOMTR-MCNC: 132 MG/DL (ref 70–99)
GLUCOSE BLDC GLUCOMTR-MCNC: 148 MG/DL (ref 70–99)
GLUCOSE BLDC GLUCOMTR-MCNC: 149 MG/DL (ref 70–99)
GLUCOSE BLDC GLUCOMTR-MCNC: 162 MG/DL (ref 70–99)
GLUCOSE BLDC GLUCOMTR-MCNC: 168 MG/DL (ref 70–99)
GLUCOSE SERPL-MCNC: 132 MG/DL (ref 65–99)
HCT VFR BLD AUTO: 32.7 % (ref 37.5–51)
HGB BLD-MCNC: 11.2 G/DL (ref 13–17.7)
IMM GRANULOCYTES # BLD AUTO: 0.02 10*3/MM3 (ref 0–0.05)
IMM GRANULOCYTES NFR BLD AUTO: 0.3 % (ref 0–0.5)
LYMPHOCYTES # BLD AUTO: 1.59 10*3/MM3 (ref 0.7–3.1)
LYMPHOCYTES NFR BLD AUTO: 26.6 % (ref 19.6–45.3)
MAGNESIUM SERPL-MCNC: 2 MG/DL (ref 1.6–2.6)
MAXIMAL PREDICTED HEART RATE: 165 BPM
MCH RBC QN AUTO: 31.2 PG (ref 26.6–33)
MCHC RBC AUTO-ENTMCNC: 34.3 G/DL (ref 31.5–35.7)
MCV RBC AUTO: 91.1 FL (ref 79–97)
MONOCYTES # BLD AUTO: 0.82 10*3/MM3 (ref 0.1–0.9)
MONOCYTES NFR BLD AUTO: 13.7 % (ref 5–12)
NEUTROPHILS NFR BLD AUTO: 3.16 10*3/MM3 (ref 1.7–7)
NEUTROPHILS NFR BLD AUTO: 52.9 % (ref 42.7–76)
NRBC BLD AUTO-RTO: 0 /100 WBC (ref 0–0.2)
PLATELET # BLD AUTO: 111 10*3/MM3 (ref 140–450)
PMV BLD AUTO: 11.2 FL (ref 6–12)
POTASSIUM SERPL-SCNC: 4.1 MMOL/L (ref 3.5–5.2)
PROT SERPL-MCNC: 6.9 G/DL (ref 6–8.5)
RBC # BLD AUTO: 3.59 10*6/MM3 (ref 4.14–5.8)
SODIUM SERPL-SCNC: 130 MMOL/L (ref 136–145)
STRESS TARGET HR: 140 BPM
WBC NRBC COR # BLD: 5.98 10*3/MM3 (ref 3.4–10.8)

## 2022-03-02 PROCEDURE — 93880 EXTRACRANIAL BILAT STUDY: CPT | Performed by: SURGERY

## 2022-03-02 PROCEDURE — 95819 EEG AWAKE AND ASLEEP: CPT

## 2022-03-02 PROCEDURE — 85025 COMPLETE CBC W/AUTO DIFF WBC: CPT | Performed by: INTERNAL MEDICINE

## 2022-03-02 PROCEDURE — 80053 COMPREHEN METABOLIC PANEL: CPT | Performed by: INTERNAL MEDICINE

## 2022-03-02 PROCEDURE — 99222 1ST HOSP IP/OBS MODERATE 55: CPT | Performed by: PSYCHIATRY & NEUROLOGY

## 2022-03-02 PROCEDURE — 95819 EEG AWAKE AND ASLEEP: CPT | Performed by: PSYCHIATRY & NEUROLOGY

## 2022-03-02 PROCEDURE — 82140 ASSAY OF AMMONIA: CPT | Performed by: INTERNAL MEDICINE

## 2022-03-02 PROCEDURE — 63710000001 INSULIN DETEMIR PER 5 UNITS: Performed by: INTERNAL MEDICINE

## 2022-03-02 PROCEDURE — 63710000001 INSULIN LISPRO (HUMAN) PER 5 UNITS: Performed by: FAMILY MEDICINE

## 2022-03-02 PROCEDURE — 83735 ASSAY OF MAGNESIUM: CPT | Performed by: INTERNAL MEDICINE

## 2022-03-02 PROCEDURE — 93880 EXTRACRANIAL BILAT STUDY: CPT

## 2022-03-02 PROCEDURE — 82962 GLUCOSE BLOOD TEST: CPT

## 2022-03-02 PROCEDURE — 99233 SBSQ HOSP IP/OBS HIGH 50: CPT | Performed by: INTERNAL MEDICINE

## 2022-03-02 PROCEDURE — 70450 CT HEAD/BRAIN W/O DYE: CPT

## 2022-03-02 RX ORDER — NALOXONE HCL 0.4 MG/ML
0.4 VIAL (ML) INJECTION
Status: DISCONTINUED | OUTPATIENT
Start: 2022-03-02 | End: 2022-03-08

## 2022-03-02 RX ADMIN — SPIRONOLACTONE 100 MG: 100 TABLET ORAL at 23:16

## 2022-03-02 RX ADMIN — Medication 10 ML: at 11:06

## 2022-03-02 RX ADMIN — BUSPIRONE HYDROCHLORIDE 5 MG: 5 TABLET ORAL at 11:03

## 2022-03-02 RX ADMIN — SERTRALINE HYDROCHLORIDE 50 MG: 50 TABLET ORAL at 11:03

## 2022-03-02 RX ADMIN — OXYCODONE HYDROCHLORIDE 10 MG: 5 TABLET ORAL at 01:22

## 2022-03-02 RX ADMIN — INSULIN LISPRO 2 UNITS: 100 INJECTION, SOLUTION INTRAVENOUS; SUBCUTANEOUS at 17:13

## 2022-03-02 RX ADMIN — LACTULOSE 30 G: 20 SOLUTION ORAL at 17:13

## 2022-03-02 RX ADMIN — AMLODIPINE BESYLATE 10 MG: 5 TABLET ORAL at 11:03

## 2022-03-02 RX ADMIN — LACTULOSE 30 G: 20 SOLUTION ORAL at 11:03

## 2022-03-02 RX ADMIN — FUROSEMIDE 80 MG: 80 TABLET ORAL at 17:13

## 2022-03-02 RX ADMIN — INSULIN DETEMIR 15 UNITS: 100 INJECTION, SOLUTION SUBCUTANEOUS at 23:18

## 2022-03-02 RX ADMIN — POTASSIUM CHLORIDE 10 MEQ: 750 CAPSULE, EXTENDED RELEASE ORAL at 11:03

## 2022-03-02 RX ADMIN — ATORVASTATIN CALCIUM 40 MG: 40 TABLET, FILM COATED ORAL at 23:16

## 2022-03-02 RX ADMIN — Medication 10 ML: at 23:18

## 2022-03-02 RX ADMIN — INSULIN LISPRO 2 UNITS: 100 INJECTION, SOLUTION INTRAVENOUS; SUBCUTANEOUS at 13:27

## 2022-03-02 RX ADMIN — SPIRONOLACTONE 100 MG: 100 TABLET ORAL at 11:03

## 2022-03-02 RX ADMIN — BUSPIRONE HYDROCHLORIDE 5 MG: 5 TABLET ORAL at 23:16

## 2022-03-02 RX ADMIN — LACTULOSE 30 G: 20 SOLUTION ORAL at 23:17

## 2022-03-02 RX ADMIN — BUSPIRONE HYDROCHLORIDE 5 MG: 5 TABLET ORAL at 17:13

## 2022-03-02 RX ADMIN — PANTOPRAZOLE SODIUM 40 MG: 40 TABLET, DELAYED RELEASE ORAL at 05:13

## 2022-03-02 RX ADMIN — FUROSEMIDE 80 MG: 80 TABLET ORAL at 11:03

## 2022-03-02 NOTE — PROGRESS NOTES
[facility]   Hospitalist Progress Note  Date: 3/2/2022  Patient Name: Nic Nicolas  : 1965  MRN: [id number]  Date of admission: 2022      Subjective   Subjective     Chief Complaint:  Confusion     Summary: 56 y.o. male with a past history of morbid obesity, Ruiz cirrhosis, hepatic encephalopathy due to hyperammonemia, obstructive sleep apnea not being treated, insulin-dependent diabetes mellitus, GERD and history of GI bleeding, history of DVT and anxious depression.  He lives alone but is brought to the hospital by his sister with concerns that he has symptoms similar to those seen in the past when he had elevated ammonia levels.  His emergency department evaluation is negative for evidence of a stroke but he does have hyperammonemia with ammonia level of 183.  He is also noted to be very weak and thought unlikely to be able to manage living alone.  The Hospitalist service is asked to admit him for treatment of his hyperammonemia and evaluation for possible rehabilitation.    Interval Followup: Patient had a stat response called this morning as he was more altered and confused.  CT head was negative.  He was more lethargic and confused after oxycodone in setting of cirrhosis.  On my interview, he is still somewhat slow to respond but returning back to his baseline mentation.  States he has not had a bowel movement since yesterday afternoon.    Review of Systems  All systems reviewed and negative unless otherwise stated under interval follow-up    Objective   Objective     Vitals:   Temp:  [97.7 °F (36.5 °C)-98.3 °F (36.8 °C)] 98.1 °F (36.7 °C)  Heart Rate:  [] 98  Resp:  [18-20] 20  BP: (116-143)/(60-84) 143/72  Physical Exam     Constitutional: Awake, alert, slightly confused but able to answer questions appropriately   Eyes: Pupils equal, sclerae anicteric, no conjunctival injection   HENT: NCAT, mucous membranes moist   Neck: Supple, no LAD   Respiratory: Clear to auscultation  bilaterally, nonlabored respirations no w/r/r    Cardiovascular: RRR, no murmurs,   Gastrointestinal: Positive bowel sounds, soft, nontender, nondistended   Musculoskeletal: No bilateral ankle edema, no clubbing or cyanosis to extremities   Psychiatric: Appropriate affect, cooperative   Neurologic: Asterixis noted, oriented x 3, strength symmetric in all extremities, Cranial Nerves grossly intact to confrontation, speech slightly slurred   Skin: No rashes     Result Review    Result Review:  I have personally reviewed the results from the time of this admission to 3/2/2022 15:15 EST and agree with these findings:  [x]  Laboratory all labs personally reviewed  [x]  Microbiology  [x]  Radiology CT head personally reviewed negative for stroke  [x]  EKG/Telemetry personally reviewed showing normal sinus rhythm with intermittent sinus tachycardia  []  Cardiology/Vascular   []  Pathology  []  Old records  []  Other:    Assessment/Plan   Assessment / Plan     Assessment/Plan:  Hepatic encephalopathy secondary to hyperammonemia  Toxic encephalopathy secondary to opioid use  ROMO cirrhosis  Weakness/debility  History of DVT  IDDM  Hypertension  GERD  Anxiety  Depression  Chronic pain syndrome  History of TBI    Continue to monitor in the hospital for management of the above  Discontinue all narcotics.  I explained the patient that we will no longer be using narcotics, in setting of cirrhosis, he does not metabolize these and it worsens his encephalopathy  Give lactulose enema once, continue lactulose 30 g 3 times daily.  Titrate to 2-3 BMs daily  Continue with Lasix 80 mg oral twice daily as well as Aldactone  Overnight oximetry showed he only had 21 seconds of desaturations down to 89%.  Does not qualify for CPAP but may benefit from outpatient sleep study  Continue appropriate home medications  PT/OT consulted, social work is working on placement  Trend renal function and electrolytes with a.m. CMP  Trend Hgb and WBC with  yara KNOWLES     Discussed plan with RN.    DVT prophylaxis:  Mechanical DVT prophylaxis orders are present.    CODE STATUS:   Level Of Support Discussed With: Patient  Code Status (Patient has no pulse and is not breathing): CPR (Attempt to Resuscitate)  Medical Interventions (Patient has pulse or is breathing): Full Support

## 2022-03-02 NOTE — CASE MANAGEMENT/SOCIAL WORK
RT CM reviewed overnight results.  Pt only had 21 seconds of desaturation (<89%) 7 hours and 12 min of study length.  Full study can be reviewed in chart.

## 2022-03-02 NOTE — NURSING NOTE
Stroke RRT called at 0906.  Arrived to room about 0915.  Other responders Kat from PCU and Santa CASAREZ, Stroke Coordinator.  Patient reporting feeling not as responsive.  Patient has started on new pain med which had received about 0100.  Patient taken to CT and back to floor.  Case discussed with Dr. Lemon and decided patient to remain on floor.  States feels it is related to pain med and that I could sign off.  Electronically signed by Santana Laird RN, 03/02/22, 10:06 AM EST.

## 2022-03-02 NOTE — PLAN OF CARE
Goal Outcome Evaluation:  Plan of Care Reviewed With: patient     Upon entering room at beginning of shift, pt alert and oriented only to self, had increased lethargy, and an increase in AMS. Code stroke called, CT head negative. Pain meds discontinued per MD. By noon, pt returned to baseline and is currently A&O x4. Oral lactulose given per eMAR, lactulose enema to be given, pt currently refusing until after dinner. BGs remained below 200, insulin given per eMAR.      Progress: no change

## 2022-03-03 LAB
GLUCOSE BLDC GLUCOMTR-MCNC: 147 MG/DL (ref 70–99)
GLUCOSE BLDC GLUCOMTR-MCNC: 179 MG/DL (ref 70–99)
GLUCOSE BLDC GLUCOMTR-MCNC: 199 MG/DL (ref 70–99)
GLUCOSE BLDC GLUCOMTR-MCNC: 265 MG/DL (ref 70–99)
QT INTERVAL: 405 MS

## 2022-03-03 PROCEDURE — 82962 GLUCOSE BLOOD TEST: CPT

## 2022-03-03 PROCEDURE — 63710000001 INSULIN DETEMIR PER 5 UNITS: Performed by: INTERNAL MEDICINE

## 2022-03-03 PROCEDURE — 63710000001 INSULIN LISPRO (HUMAN) PER 5 UNITS: Performed by: FAMILY MEDICINE

## 2022-03-03 PROCEDURE — 99232 SBSQ HOSP IP/OBS MODERATE 35: CPT | Performed by: INTERNAL MEDICINE

## 2022-03-03 RX ORDER — ALUMINA, MAGNESIA, AND SIMETHICONE 2400; 2400; 240 MG/30ML; MG/30ML; MG/30ML
15 SUSPENSION ORAL ONCE
Status: COMPLETED | OUTPATIENT
Start: 2022-03-03 | End: 2022-03-03

## 2022-03-03 RX ORDER — ACETAMINOPHEN 325 MG/1
650 TABLET ORAL 3 TIMES DAILY
Status: DISCONTINUED | OUTPATIENT
Start: 2022-03-03 | End: 2022-03-04

## 2022-03-03 RX ORDER — DICYCLOMINE HYDROCHLORIDE 10 MG/ML
20 INJECTION INTRAMUSCULAR 4 TIMES DAILY PRN
Status: DISCONTINUED | OUTPATIENT
Start: 2022-03-03 | End: 2022-03-09 | Stop reason: HOSPADM

## 2022-03-03 RX ORDER — LIDOCAINE 50 MG/G
2 PATCH TOPICAL
Status: DISCONTINUED | OUTPATIENT
Start: 2022-03-04 | End: 2022-03-09 | Stop reason: HOSPADM

## 2022-03-03 RX ORDER — CALCIUM CARBONATE 200(500)MG
2 TABLET,CHEWABLE ORAL 3 TIMES DAILY PRN
Status: DISCONTINUED | OUTPATIENT
Start: 2022-03-03 | End: 2022-03-09 | Stop reason: HOSPADM

## 2022-03-03 RX ORDER — LACTULOSE 10 G/15ML
20 SOLUTION ORAL 3 TIMES DAILY
Status: DISCONTINUED | OUTPATIENT
Start: 2022-03-03 | End: 2022-03-04

## 2022-03-03 RX ORDER — TRAMADOL HYDROCHLORIDE 50 MG/1
50 TABLET ORAL EVERY 6 HOURS PRN
Status: DISCONTINUED | OUTPATIENT
Start: 2022-03-03 | End: 2022-03-04

## 2022-03-03 RX ORDER — LIDOCAINE HYDROCHLORIDE 20 MG/ML
15 SOLUTION OROPHARYNGEAL ONCE
Status: COMPLETED | OUTPATIENT
Start: 2022-03-03 | End: 2022-03-03

## 2022-03-03 RX ADMIN — INSULIN LISPRO 2 UNITS: 100 INJECTION, SOLUTION INTRAVENOUS; SUBCUTANEOUS at 11:19

## 2022-03-03 RX ADMIN — LACTULOSE 30 G: 20 SOLUTION ORAL at 09:24

## 2022-03-03 RX ADMIN — BUSPIRONE HYDROCHLORIDE 5 MG: 5 TABLET ORAL at 17:08

## 2022-03-03 RX ADMIN — LACTULOSE 20 G: 20 SOLUTION ORAL at 21:08

## 2022-03-03 RX ADMIN — INSULIN LISPRO 4 UNITS: 100 INJECTION, SOLUTION INTRAVENOUS; SUBCUTANEOUS at 17:08

## 2022-03-03 RX ADMIN — POTASSIUM CHLORIDE 10 MEQ: 750 CAPSULE, EXTENDED RELEASE ORAL at 09:24

## 2022-03-03 RX ADMIN — SPIRONOLACTONE 100 MG: 100 TABLET ORAL at 21:08

## 2022-03-03 RX ADMIN — BUSPIRONE HYDROCHLORIDE 5 MG: 5 TABLET ORAL at 21:08

## 2022-03-03 RX ADMIN — TRAMADOL HYDROCHLORIDE 50 MG: 50 TABLET, COATED ORAL at 11:09

## 2022-03-03 RX ADMIN — ALUMINUM HYDROXIDE, MAGNESIUM HYDROXIDE, AND DIMETHICONE 15 ML: 400; 400; 40 SUSPENSION ORAL at 11:09

## 2022-03-03 RX ADMIN — ACETAMINOPHEN 650 MG: 325 TABLET ORAL at 17:08

## 2022-03-03 RX ADMIN — Medication 10 ML: at 09:24

## 2022-03-03 RX ADMIN — ACETAMINOPHEN 650 MG: 325 TABLET ORAL at 11:09

## 2022-03-03 RX ADMIN — LACTULOSE 20 G: 20 SOLUTION ORAL at 17:08

## 2022-03-03 RX ADMIN — AMLODIPINE BESYLATE 10 MG: 5 TABLET ORAL at 09:24

## 2022-03-03 RX ADMIN — SPIRONOLACTONE 100 MG: 100 TABLET ORAL at 09:24

## 2022-03-03 RX ADMIN — ATORVASTATIN CALCIUM 40 MG: 40 TABLET, FILM COATED ORAL at 21:08

## 2022-03-03 RX ADMIN — LIDOCAINE HYDROCHLORIDE 15 ML: 20 SOLUTION ORAL; TOPICAL at 11:10

## 2022-03-03 RX ADMIN — INSULIN DETEMIR 15 UNITS: 100 INJECTION, SOLUTION SUBCUTANEOUS at 21:08

## 2022-03-03 RX ADMIN — Medication 10 ML: at 21:11

## 2022-03-03 RX ADMIN — SERTRALINE HYDROCHLORIDE 50 MG: 50 TABLET ORAL at 09:24

## 2022-03-03 RX ADMIN — FUROSEMIDE 80 MG: 80 TABLET ORAL at 09:24

## 2022-03-03 RX ADMIN — TRAMADOL HYDROCHLORIDE 50 MG: 50 TABLET, COATED ORAL at 17:08

## 2022-03-03 RX ADMIN — PANTOPRAZOLE SODIUM 40 MG: 40 TABLET, DELAYED RELEASE ORAL at 05:43

## 2022-03-03 RX ADMIN — FUROSEMIDE 80 MG: 80 TABLET ORAL at 17:08

## 2022-03-03 RX ADMIN — ACETAMINOPHEN 650 MG: 325 TABLET ORAL at 21:08

## 2022-03-03 RX ADMIN — BUSPIRONE HYDROCHLORIDE 5 MG: 5 TABLET ORAL at 09:24

## 2022-03-03 NOTE — PROGRESS NOTES
" [facility]   Hospitalist Progress Note  Date: 3/3/2022  Patient Name: Nic Nicolas  : 1965  MRN: [id number]  Date of admission: 2022      Subjective   Subjective     Chief Complaint:  Confusion     Summary: 56 y.o. male with a past history of morbid obesity, Ruiz cirrhosis, hepatic encephalopathy due to hyperammonemia, obstructive sleep apnea not being treated, insulin-dependent diabetes mellitus, GERD and history of GI bleeding, history of DVT and anxious depression.  He lives alone but is brought to the hospital by his sister with concerns that he has symptoms similar to those seen in the past when he had elevated ammonia levels.  His emergency department evaluation is negative for evidence of a stroke but he does have hyperammonemia with ammonia level of 183.  He is also noted to be very weak and thought unlikely to be able to manage living alone.  The Hospitalist service is asked to admit him for treatment of his hyperammonemia and evaluation for possible rehabilitation.    Interval Followup: Patient is back to his baseline mentation this morning.  Still complains of neck and back pain and states the pain is \"unbearable.\"  He does understand that we reach an agreement that we will use heavy narcotics.  He is requesting we restart Ultram as he states he will try anything.  Has been having more bowel movements over the last 24 hours with improvement of his mentation.  He is also complaining of abdominal cramping.    Review of Systems  All systems reviewed and negative unless otherwise stated under interval follow-up    Objective   Objective     Vitals:   Temp:  [97.5 °F (36.4 °C)-98.8 °F (37.1 °C)] 98.2 °F (36.8 °C)  Heart Rate:  [] 103  Resp:  [20] 20  BP: (128-153)/(42-75) 133/42  Physical Exam     Constitutional: Awake, alert, NAD, resting in bed on arrival   Eyes: Pupils equal, sclerae anicteric, no conjunctival injection   HENT: NCAT, mucous membranes moist   Neck: Supple, no " LAD   Respiratory: Clear to auscultation bilaterally, nonlabored respirations no w/r/r    Cardiovascular: RRR, no murmurs,   Gastrointestinal: Positive bowel sounds, soft, nontender, nondistended   Musculoskeletal: No bilateral ankle edema, no clubbing or cyanosis to extremities   Psychiatric: Appropriate affect, cooperative   Neurologic: Asterixis noted, oriented x 3, strength symmetric in all extremities, Cranial Nerves grossly intact to confrontation, speech slightly slurred   Skin: No rashes     Result Review    Result Review:  I have personally reviewed the results from the time of this admission to 3/3/2022 16:13 EST and agree with these findings:  [x]  Laboratory all labs personally reviewed  [x]  Microbiology  [x]  Radiology duplex carotid ultrasound negative for large vessel blockage  [x]  EKG/Telemetry personally reviewed showing normal sinus rhythm   []  Cardiology/Vascular   []  Pathology  []  Old records  []  Other:    Assessment/Plan   Assessment / Plan     Assessment/Plan:  Hepatic encephalopathy secondary to hyperammonemia  Toxic encephalopathy secondary to opioid use  ROMO cirrhosis  Weakness/debility  History of DVT  IDDM  Hypertension  GERD  Anxiety  Depression  Chronic pain syndrome  History of TBI    Continue to monitor in the hospital for management of the above  Decrease lactulose to 20 g 3 times daily.  Titrate to 2-3 BMs daily  Can use Tums, GI cocktail as needed.  Can use Bentyl as needed for abdominal cramping  Continue with Lasix 80 mg oral twice daily as well as Aldactone  Overnight oximetry showed he only had 21 seconds of desaturations down to 89%.  Does not qualify for CPAP but may benefit from outpatient sleep study  Will schedule Tylenol 60 mg 3 times daily, can use lidocaine patch, heating pad, or Ultram as needed for pain  Continue appropriate home medications  PT/OT consulted, social work is working on placement  Trend renal function and electrolytes with a.m. CMP  Trend Hgb and  WBC with a.m. CBC     Discussed plan with RN.    DVT prophylaxis:  Mechanical DVT prophylaxis orders are present.    CODE STATUS:   Level Of Support Discussed With: Patient  Code Status (Patient has no pulse and is not breathing): CPR (Attempt to Resuscitate)  Medical Interventions (Patient has pulse or is breathing): Full Support

## 2022-03-03 NOTE — PLAN OF CARE
Goal Outcome Evaluation:  Plan of Care Reviewed With: patient            Patient visited by specialist, this shift. Specialist explained patient was demonstrating asterixis and broke down the difference between hepatic encephalopathy and stroke to patient. Patient did appear to understand but unable to re-iterate teaching, very able to re-state that it is unsafe for him to take narcotic or pain management medications at this time. Repositioning relieves pain when it rallies, but for the most part no complaints, this shift. Standby assist to bathroom, patient compliant with falls protocol; call bell used, awaits staff to turn off bed alarm, walks with unsteady gait independently to bathroom. Enema declined x 2 this shift, large bowel movement shortly after administering by-mouth lactulose, enema now contraindicated. Will continue to monitor.
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
## 2022-03-03 NOTE — SIGNIFICANT NOTE
03/03/22 1249   Plan   Plan Comments RANDA called and spoke with pts sister Scott regarding discharge plan. We are not able to find a local facility to accept pt at this time. Scott was agreeable for surrounding [facility] referrals to be sent. RANDA sent off more referral searching for a rehab bed with potential for LTC placement.

## 2022-03-04 ENCOUNTER — APPOINTMENT (OUTPATIENT)
Dept: GENERAL RADIOLOGY | Facility: HOSPITAL | Age: 57
End: 2022-03-04

## 2022-03-04 ENCOUNTER — APPOINTMENT (OUTPATIENT)
Dept: MRI IMAGING | Facility: HOSPITAL | Age: 57
End: 2022-03-04

## 2022-03-04 LAB
ALBUMIN SERPL-MCNC: 3.4 G/DL (ref 3.5–5.2)
ALBUMIN/GLOB SERPL: 1 G/DL
ALP SERPL-CCNC: 135 U/L (ref 39–117)
ALT SERPL W P-5'-P-CCNC: 24 U/L (ref 1–41)
AMMONIA BLD-SCNC: 121 UMOL/L (ref 16–60)
ANION GAP SERPL CALCULATED.3IONS-SCNC: 11.8 MMOL/L (ref 5–15)
AST SERPL-CCNC: 40 U/L (ref 1–40)
BASE EXCESS BLDA CALC-SCNC: 0.3 MMOL/L (ref -2–2)
BASOPHILS # BLD AUTO: 0.07 10*3/MM3 (ref 0–0.2)
BASOPHILS NFR BLD AUTO: 1.2 % (ref 0–1.5)
BDY SITE: ABNORMAL
BILIRUB SERPL-MCNC: 1.6 MG/DL (ref 0–1.2)
BILIRUB UR QL STRIP: NEGATIVE
BUN SERPL-MCNC: 37 MG/DL (ref 6–20)
BUN/CREAT SERPL: 27 (ref 7–25)
CALCIUM SPEC-SCNC: 9.7 MG/DL (ref 8.6–10.5)
CHLORIDE SERPL-SCNC: 96 MMOL/L (ref 98–107)
CLARITY UR: CLEAR
CO2 SERPL-SCNC: 22.2 MMOL/L (ref 22–29)
COHGB MFR BLD: 0.2 % (ref 0–1.5)
COLOR UR: YELLOW
CREAT SERPL-MCNC: 1.37 MG/DL (ref 0.76–1.27)
D-LACTATE SERPL-SCNC: 1.8 MMOL/L (ref 0.5–2)
DEPRECATED RDW RBC AUTO: 53 FL (ref 37–54)
EGFRCR SERPLBLD CKD-EPI 2021: 60.9 ML/MIN/1.73
EOSINOPHIL # BLD AUTO: 0.17 10*3/MM3 (ref 0–0.4)
EOSINOPHIL NFR BLD AUTO: 2.8 % (ref 0.3–6.2)
ERYTHROCYTE [DISTWIDTH] IN BLOOD BY AUTOMATED COUNT: 16.2 % (ref 12.3–15.4)
FHHB: 3 % (ref 0–5)
GLOBULIN UR ELPH-MCNC: 3.5 GM/DL
GLUCOSE BLDC GLUCOMTR-MCNC: 142 MG/DL (ref 70–99)
GLUCOSE BLDC GLUCOMTR-MCNC: 175 MG/DL (ref 70–99)
GLUCOSE BLDC GLUCOMTR-MCNC: 180 MG/DL (ref 70–99)
GLUCOSE BLDC GLUCOMTR-MCNC: 307 MG/DL (ref 70–99)
GLUCOSE SERPL-MCNC: 161 MG/DL (ref 65–99)
GLUCOSE UR STRIP-MCNC: NEGATIVE MG/DL
HCO3 BLDA-SCNC: 22.4 MMOL/L (ref 22–26)
HCT VFR BLD AUTO: 32.2 % (ref 37.5–51)
HGB BLD-MCNC: 11.6 G/DL (ref 13–17.7)
HGB BLDA-MCNC: 13.1 G/DL (ref 13.8–16.4)
HGB UR QL STRIP.AUTO: NEGATIVE
IMM GRANULOCYTES # BLD AUTO: 0.02 10*3/MM3 (ref 0–0.05)
IMM GRANULOCYTES NFR BLD AUTO: 0.3 % (ref 0–0.5)
INHALED O2 CONCENTRATION: 21 %
KETONES UR QL STRIP: NEGATIVE
LEUKOCYTE ESTERASE UR QL STRIP.AUTO: NEGATIVE
LYMPHOCYTES # BLD AUTO: 1.56 10*3/MM3 (ref 0.7–3.1)
LYMPHOCYTES NFR BLD AUTO: 25.7 % (ref 19.6–45.3)
MAGNESIUM SERPL-MCNC: 2.2 MG/DL (ref 1.6–2.6)
MCH RBC QN AUTO: 31.8 PG (ref 26.6–33)
MCHC RBC AUTO-ENTMCNC: 36 G/DL (ref 31.5–35.7)
MCV RBC AUTO: 88.2 FL (ref 79–97)
METHGB BLD QL: 0.3 % (ref 0–1.5)
MODALITY: ABNORMAL
MONOCYTES # BLD AUTO: 0.84 10*3/MM3 (ref 0.1–0.9)
MONOCYTES NFR BLD AUTO: 13.8 % (ref 5–12)
NEUTROPHILS NFR BLD AUTO: 3.41 10*3/MM3 (ref 1.7–7)
NEUTROPHILS NFR BLD AUTO: 56.2 % (ref 42.7–76)
NITRITE UR QL STRIP: NEGATIVE
NRBC BLD AUTO-RTO: 0 /100 WBC (ref 0–0.2)
OXYHGB MFR BLDV: 96.5 % (ref 94–99)
PCO2 BLDA: 29 MM HG (ref 35–45)
PH BLDA: 7.5 PH UNITS (ref 7.35–7.45)
PH UR STRIP.AUTO: 5.5 [PH] (ref 5–8)
PHOSPHATE SERPL-MCNC: 3.9 MG/DL (ref 2.5–4.5)
PLATELET # BLD AUTO: 120 10*3/MM3 (ref 140–450)
PMV BLD AUTO: 10.6 FL (ref 6–12)
PO2 BLD: 479 MM[HG] (ref 0–500)
PO2 BLDA: 100.5 MM HG (ref 80–100)
POTASSIUM SERPL-SCNC: 4 MMOL/L (ref 3.5–5.2)
PROT SERPL-MCNC: 6.9 G/DL (ref 6–8.5)
PROT UR QL STRIP: NEGATIVE
RBC # BLD AUTO: 3.65 10*6/MM3 (ref 4.14–5.8)
SAO2 % BLDCOA: 97 % (ref 95–99)
SODIUM SERPL-SCNC: 130 MMOL/L (ref 136–145)
SP GR UR STRIP: 1.01 (ref 1–1.03)
UROBILINOGEN UR QL STRIP: NORMAL
WBC NRBC COR # BLD: 6.07 10*3/MM3 (ref 3.4–10.8)

## 2022-03-04 PROCEDURE — 63710000001 INSULIN DETEMIR PER 5 UNITS: Performed by: INTERNAL MEDICINE

## 2022-03-04 PROCEDURE — 71045 X-RAY EXAM CHEST 1 VIEW: CPT

## 2022-03-04 PROCEDURE — 81003 URINALYSIS AUTO W/O SCOPE: CPT | Performed by: INTERNAL MEDICINE

## 2022-03-04 PROCEDURE — 84100 ASSAY OF PHOSPHORUS: CPT | Performed by: INTERNAL MEDICINE

## 2022-03-04 PROCEDURE — 82140 ASSAY OF AMMONIA: CPT | Performed by: INTERNAL MEDICINE

## 2022-03-04 PROCEDURE — 25010000002 ONDANSETRON PER 1 MG: Performed by: FAMILY MEDICINE

## 2022-03-04 PROCEDURE — 70551 MRI BRAIN STEM W/O DYE: CPT

## 2022-03-04 PROCEDURE — 80053 COMPREHEN METABOLIC PANEL: CPT | Performed by: INTERNAL MEDICINE

## 2022-03-04 PROCEDURE — 83605 ASSAY OF LACTIC ACID: CPT | Performed by: INTERNAL MEDICINE

## 2022-03-04 PROCEDURE — 99233 SBSQ HOSP IP/OBS HIGH 50: CPT | Performed by: INTERNAL MEDICINE

## 2022-03-04 PROCEDURE — 83050 HGB METHEMOGLOBIN QUAN: CPT | Performed by: INTERNAL MEDICINE

## 2022-03-04 PROCEDURE — 87040 BLOOD CULTURE FOR BACTERIA: CPT | Performed by: INTERNAL MEDICINE

## 2022-03-04 PROCEDURE — 82375 ASSAY CARBOXYHB QUANT: CPT | Performed by: INTERNAL MEDICINE

## 2022-03-04 PROCEDURE — 85025 COMPLETE CBC W/AUTO DIFF WBC: CPT | Performed by: INTERNAL MEDICINE

## 2022-03-04 PROCEDURE — 83735 ASSAY OF MAGNESIUM: CPT | Performed by: INTERNAL MEDICINE

## 2022-03-04 PROCEDURE — 63710000001 INSULIN LISPRO (HUMAN) PER 5 UNITS: Performed by: FAMILY MEDICINE

## 2022-03-04 PROCEDURE — 82962 GLUCOSE BLOOD TEST: CPT

## 2022-03-04 PROCEDURE — 82805 BLOOD GASES W/O2 SATURATION: CPT | Performed by: INTERNAL MEDICINE

## 2022-03-04 RX ORDER — ACETAMINOPHEN 325 MG/1
650 TABLET ORAL 3 TIMES DAILY
Status: DISCONTINUED | OUTPATIENT
Start: 2022-03-04 | End: 2022-03-09 | Stop reason: HOSPADM

## 2022-03-04 RX ORDER — CYCLOBENZAPRINE HCL 5 MG
5 TABLET ORAL EVERY 8 HOURS PRN
Status: DISCONTINUED | OUTPATIENT
Start: 2022-03-04 | End: 2022-03-08

## 2022-03-04 RX ORDER — POLYETHYLENE GLYCOL 3350 17 G/17G
17 POWDER, FOR SOLUTION ORAL DAILY
Status: DISCONTINUED | OUTPATIENT
Start: 2022-03-04 | End: 2022-03-09 | Stop reason: HOSPADM

## 2022-03-04 RX ORDER — LACTULOSE 10 G/15ML
30 SOLUTION ORAL 3 TIMES DAILY
Status: DISCONTINUED | OUTPATIENT
Start: 2022-03-04 | End: 2022-03-09 | Stop reason: HOSPADM

## 2022-03-04 RX ADMIN — LACTULOSE 30 G: 10 SOLUTION ORAL at 16:00

## 2022-03-04 RX ADMIN — FUROSEMIDE 80 MG: 80 TABLET ORAL at 17:38

## 2022-03-04 RX ADMIN — ACETAMINOPHEN 650 MG: 325 TABLET ORAL at 22:25

## 2022-03-04 RX ADMIN — Medication 10 ML: at 22:26

## 2022-03-04 RX ADMIN — LACTULOSE 30 G: 10 SOLUTION ORAL at 11:09

## 2022-03-04 RX ADMIN — POLYETHYLENE GLYCOL 3350 17 G: 17 POWDER, FOR SOLUTION ORAL at 11:11

## 2022-03-04 RX ADMIN — ONDANSETRON 4 MG: 2 INJECTION INTRAMUSCULAR; INTRAVENOUS at 16:11

## 2022-03-04 RX ADMIN — LACTULOSE 30 G: 10 SOLUTION ORAL at 22:25

## 2022-03-04 RX ADMIN — SPIRONOLACTONE 100 MG: 100 TABLET ORAL at 22:25

## 2022-03-04 RX ADMIN — ATORVASTATIN CALCIUM 40 MG: 40 TABLET, FILM COATED ORAL at 22:26

## 2022-03-04 RX ADMIN — SPIRONOLACTONE 100 MG: 100 TABLET ORAL at 11:11

## 2022-03-04 RX ADMIN — INSULIN LISPRO 2 UNITS: 100 INJECTION, SOLUTION INTRAVENOUS; SUBCUTANEOUS at 12:31

## 2022-03-04 RX ADMIN — PANTOPRAZOLE SODIUM 40 MG: 40 TABLET, DELAYED RELEASE ORAL at 05:56

## 2022-03-04 RX ADMIN — LIDOCAINE 2 PATCH: 50 PATCH CUTANEOUS at 11:08

## 2022-03-04 RX ADMIN — INSULIN LISPRO 5 UNITS: 100 INJECTION, SOLUTION INTRAVENOUS; SUBCUTANEOUS at 17:38

## 2022-03-04 RX ADMIN — INSULIN DETEMIR 15 UNITS: 100 INJECTION, SOLUTION SUBCUTANEOUS at 22:26

## 2022-03-04 RX ADMIN — BUSPIRONE HYDROCHLORIDE 5 MG: 5 TABLET ORAL at 16:00

## 2022-03-04 RX ADMIN — Medication 300 ML: at 11:10

## 2022-03-04 RX ADMIN — ONDANSETRON 4 MG: 2 INJECTION INTRAMUSCULAR; INTRAVENOUS at 07:55

## 2022-03-04 RX ADMIN — Medication 10 ML: at 11:12

## 2022-03-04 RX ADMIN — POTASSIUM CHLORIDE 10 MEQ: 750 CAPSULE, EXTENDED RELEASE ORAL at 11:11

## 2022-03-04 RX ADMIN — RIFAXIMIN 550 MG: 550 TABLET ORAL at 22:25

## 2022-03-04 RX ADMIN — FUROSEMIDE 80 MG: 80 TABLET ORAL at 11:11

## 2022-03-04 RX ADMIN — ACETAMINOPHEN 650 MG: 325 TABLET ORAL at 16:48

## 2022-03-04 RX ADMIN — AMLODIPINE BESYLATE 10 MG: 5 TABLET ORAL at 11:11

## 2022-03-04 RX ADMIN — BUSPIRONE HYDROCHLORIDE 5 MG: 5 TABLET ORAL at 11:11

## 2022-03-04 RX ADMIN — SERTRALINE HYDROCHLORIDE 50 MG: 50 TABLET ORAL at 11:11

## 2022-03-04 RX ADMIN — BUSPIRONE HYDROCHLORIDE 5 MG: 5 TABLET ORAL at 22:25

## 2022-03-04 RX ADMIN — ACETAMINOPHEN 650 MG: 325 TABLET ORAL at 11:11

## 2022-03-04 NOTE — NURSING NOTE
"At 0745 this morning, patient woke up to use restroom and was disoriented. It was noted that patient was alert and oriented x4 all night last night. Patient stated he was feeling lightheaded/dizzy and that \"something was wrong.\" Vital signs remained stable but patient had acute mental status change. RRT was called at 0800. Attending MD notified as well. New orders placed for labs, ABGs, STAT MRI, lactulose enema, and adjusted lactulose dosage. When patient returned from MRI, he stated that he felt a little bit better but was still disoriented to time. Vital signs still remain stable.  "

## 2022-03-04 NOTE — NURSING NOTE
Patient A&O x4, in pleasant mood. Pt c/o back pain throughout shift, PRN tramadol and scheduled tylenol given. Pt had 3 BMs this shift, assisted to the bathroom with standby assist. Telemetry discontinued, PCU notified.

## 2022-03-04 NOTE — PLAN OF CARE
"Goal Outcome Evaluation:           Progress: no change  Outcome Summary: At beginning of shift, pt was A&ox3, minus time. Pt was complaining of dizziness and lightheadness. Stating \"not feeling right\" and \"something is wrong.\" Pt was RRT due to increased disorientation, MD was notifed and orders were placed. By end of shift pt is now A&Ox4 after having several bowel movements this shift. Pt remains on room air maintaining stable oxygen saturation. Pt is complaining of pain and was medicated with PRN tyneol and KPAD was placed as per MD orders. All other vitals remained stable this shift. Will continue plan of care.  "

## 2022-03-04 NOTE — PROGRESS NOTES
" [facility]   Hospitalist Progress Note  Date: 3/4/2022  Patient Name: Nic Nicolas  : 1965  MRN: [id number]  Date of admission: 2022      Subjective   Subjective     Chief Complaint:  Confusion     Summary: 56 y.o. male with a past history of morbid obesity, Ruiz cirrhosis, hepatic encephalopathy due to hyperammonemia, obstructive sleep apnea not being treated, insulin-dependent diabetes mellitus, GERD and history of GI bleeding, history of DVT and anxious depression.  He lives alone but is brought to the hospital by his sister with concerns that he has symptoms similar to those seen in the past when he had elevated ammonia levels.  His emergency department evaluation is negative for evidence of a stroke but he does have hyperammonemia with ammonia level of 183.  He is also noted to be very weak and thought unlikely to be able to manage living alone.  The Hospitalist service is asked to admit him for treatment of his hyperammonemia and evaluation for possible rehabilitation.    Interval Followup: Patient had worsening confusion again this morning.  Initially, he did not know his name or where he was.  He was wandering about the room aimlessly.  He was very uncomfortable and kept repeating \"something is wrong.\"  Stat MRI was performed which was negative for stroke.  Ammonia levels are still elevated.  On my interview, he is still confused but improved from this morning.  States he does feel foggy and confused.  However, he knows where he is and who he is.  Denies fevers or chills, dysuria, shortness of breath.    Review of Systems  All systems reviewed and negative unless otherwise stated under interval follow-up    Objective   Objective     Vitals:   Temp:  [97.6 °F (36.4 °C)-98.6 °F (37 °C)] 98.6 °F (37 °C)  Heart Rate:  [] 106  Resp:  [13-20] 18  BP: (118-152)/(42-90) 138/71  Physical Exam     Constitutional: Awake, alert, intermittently confused, intermittent word finding " difficulty   Eyes: Pupils equal, sclerae anicteric, no conjunctival injection   HENT: NCAT, mucous membranes moist   Neck: Supple, no LAD   Respiratory: Diminished breath sounds at bilateral bases, clear to auscultation bilaterally, nonlabored respirations no w/r/r    Cardiovascular: RRR, no murmurs,   Gastrointestinal: Positive bowel sounds, soft, nontender, nondistended   Musculoskeletal: No bilateral ankle edema, no clubbing or cyanosis to extremities   Psychiatric: Appropriate affect, cooperative   Neurologic: Asterixis noted, oriented x 3, strength symmetric in all extremities, Cranial Nerves grossly intact to confrontation, speech slightly slurred   Skin: No rashes     Result Review    Result Review:  I have personally reviewed the results from the time of this admission to 3/4/2022 14:30 EST and agree with these findings:  [x]  Laboratory sodium 130, potassium 4.0, creatinine 1.37, bilirubin 1.6, ammonia level 121, WBC 6, hemoglobin 11, platelets 120  [x]  Microbiology  [x]  Radiology MRI reviewed, atrophy and white matter changes compatible with small-vessel ischemic change  []  EKG/Telemetry   []  Cardiology/Vascular   []  Pathology  []  Old records  []  Other:    Assessment/Plan   Assessment / Plan     Assessment/Plan:  Hepatic encephalopathy secondary to hyperammonemia  Toxic encephalopathy secondary to opioid use  ROMO cirrhosis  Weakness/debility  History of DVT  IDDM  Hypertension  GERD  Anxiety  Depression  Chronic pain syndrome  History of TBI    Continue to monitor in the hospital for management of the above  Increase lactulose back to 30 g 3 times daily.  Patient would benefit from 3-4 BMs daily.  Give lactulose enema today  We will rule out infectious causes of his encephalopathy, check urinalysis, chest x-ray, blood culture  Can use Tums, GI cocktail as needed.  Can use Bentyl as needed for abdominal cramping  Continue with Lasix 80 mg oral twice daily as well as Aldactone  DC Ultram for now and  hold all sedating medications  Continue appropriate home medications  PT/OT consulted, social work is working on placement  Trend renal function and electrolytes with a.m. CMP  Trend Hgb and WBC with a.m. CBC     Discussed plan with RN.    DVT prophylaxis:  Mechanical DVT prophylaxis orders are present.    CODE STATUS:   Level Of Support Discussed With: Patient  Code Status (Patient has no pulse and is not breathing): CPR (Attempt to Resuscitate)  Medical Interventions (Patient has pulse or is breathing): Full Support

## 2022-03-05 LAB
ALBUMIN SERPL-MCNC: 3.7 G/DL (ref 3.5–5.2)
ALBUMIN/GLOB SERPL: 0.9 G/DL
ALP SERPL-CCNC: 141 U/L (ref 39–117)
ALT SERPL W P-5'-P-CCNC: 28 U/L (ref 1–41)
ANION GAP SERPL CALCULATED.3IONS-SCNC: 13.8 MMOL/L (ref 5–15)
AST SERPL-CCNC: 45 U/L (ref 1–40)
BASOPHILS # BLD AUTO: 0.09 10*3/MM3 (ref 0–0.2)
BASOPHILS NFR BLD AUTO: 1.3 % (ref 0–1.5)
BILIRUB SERPL-MCNC: 1.9 MG/DL (ref 0–1.2)
BUN SERPL-MCNC: 38 MG/DL (ref 6–20)
BUN/CREAT SERPL: 21.8 (ref 7–25)
CALCIUM SPEC-SCNC: 9.9 MG/DL (ref 8.6–10.5)
CHLORIDE SERPL-SCNC: 95 MMOL/L (ref 98–107)
CO2 SERPL-SCNC: 18.2 MMOL/L (ref 22–29)
CREAT SERPL-MCNC: 1.74 MG/DL (ref 0.76–1.27)
DEPRECATED RDW RBC AUTO: 53.1 FL (ref 37–54)
EGFRCR SERPLBLD CKD-EPI 2021: 45.7 ML/MIN/1.73
EOSINOPHIL # BLD AUTO: 0.09 10*3/MM3 (ref 0–0.4)
EOSINOPHIL NFR BLD AUTO: 1.3 % (ref 0.3–6.2)
ERYTHROCYTE [DISTWIDTH] IN BLOOD BY AUTOMATED COUNT: 16.1 % (ref 12.3–15.4)
GLOBULIN UR ELPH-MCNC: 4.2 GM/DL
GLUCOSE BLDC GLUCOMTR-MCNC: 181 MG/DL (ref 70–99)
GLUCOSE BLDC GLUCOMTR-MCNC: 237 MG/DL (ref 70–99)
GLUCOSE BLDC GLUCOMTR-MCNC: 255 MG/DL (ref 70–99)
GLUCOSE BLDC GLUCOMTR-MCNC: 278 MG/DL (ref 70–99)
GLUCOSE SERPL-MCNC: 276 MG/DL (ref 65–99)
HCT VFR BLD AUTO: 36.8 % (ref 37.5–51)
HGB BLD-MCNC: 13.2 G/DL (ref 13–17.7)
IMM GRANULOCYTES # BLD AUTO: 0.02 10*3/MM3 (ref 0–0.05)
IMM GRANULOCYTES NFR BLD AUTO: 0.3 % (ref 0–0.5)
LYMPHOCYTES # BLD AUTO: 1.24 10*3/MM3 (ref 0.7–3.1)
LYMPHOCYTES NFR BLD AUTO: 17.7 % (ref 19.6–45.3)
MCH RBC QN AUTO: 32.1 PG (ref 26.6–33)
MCHC RBC AUTO-ENTMCNC: 35.9 G/DL (ref 31.5–35.7)
MCV RBC AUTO: 89.5 FL (ref 79–97)
MONOCYTES # BLD AUTO: 0.96 10*3/MM3 (ref 0.1–0.9)
MONOCYTES NFR BLD AUTO: 13.7 % (ref 5–12)
NEUTROPHILS NFR BLD AUTO: 4.59 10*3/MM3 (ref 1.7–7)
NEUTROPHILS NFR BLD AUTO: 65.7 % (ref 42.7–76)
NRBC BLD AUTO-RTO: 0 /100 WBC (ref 0–0.2)
PLATELET # BLD AUTO: 146 10*3/MM3 (ref 140–450)
PMV BLD AUTO: 10.6 FL (ref 6–12)
POTASSIUM SERPL-SCNC: 4 MMOL/L (ref 3.5–5.2)
PROT SERPL-MCNC: 7.9 G/DL (ref 6–8.5)
RBC # BLD AUTO: 4.11 10*6/MM3 (ref 4.14–5.8)
SODIUM SERPL-SCNC: 127 MMOL/L (ref 136–145)
WBC NRBC COR # BLD: 6.99 10*3/MM3 (ref 3.4–10.8)

## 2022-03-05 PROCEDURE — 99232 SBSQ HOSP IP/OBS MODERATE 35: CPT | Performed by: INTERNAL MEDICINE

## 2022-03-05 PROCEDURE — 82962 GLUCOSE BLOOD TEST: CPT

## 2022-03-05 PROCEDURE — 63710000001 INSULIN LISPRO (HUMAN) PER 5 UNITS: Performed by: FAMILY MEDICINE

## 2022-03-05 PROCEDURE — 63710000001 INSULIN DETEMIR PER 5 UNITS: Performed by: INTERNAL MEDICINE

## 2022-03-05 PROCEDURE — 85025 COMPLETE CBC W/AUTO DIFF WBC: CPT | Performed by: INTERNAL MEDICINE

## 2022-03-05 PROCEDURE — 80053 COMPREHEN METABOLIC PANEL: CPT | Performed by: INTERNAL MEDICINE

## 2022-03-05 RX ADMIN — PANTOPRAZOLE SODIUM 40 MG: 40 TABLET, DELAYED RELEASE ORAL at 05:23

## 2022-03-05 RX ADMIN — BUSPIRONE HYDROCHLORIDE 5 MG: 5 TABLET ORAL at 21:10

## 2022-03-05 RX ADMIN — LACTULOSE 30 G: 10 SOLUTION ORAL at 17:12

## 2022-03-05 RX ADMIN — INSULIN LISPRO 2 UNITS: 100 INJECTION, SOLUTION INTRAVENOUS; SUBCUTANEOUS at 09:26

## 2022-03-05 RX ADMIN — SODIUM CHLORIDE 500 ML: 9 INJECTION, SOLUTION INTRAVENOUS at 15:46

## 2022-03-05 RX ADMIN — ACETAMINOPHEN 650 MG: 325 TABLET ORAL at 21:10

## 2022-03-05 RX ADMIN — Medication 10 ML: at 21:10

## 2022-03-05 RX ADMIN — AMLODIPINE BESYLATE 10 MG: 5 TABLET ORAL at 09:26

## 2022-03-05 RX ADMIN — ACETAMINOPHEN 650 MG: 325 TABLET ORAL at 09:27

## 2022-03-05 RX ADMIN — CYCLOBENZAPRINE 5 MG: 5 TABLET, FILM COATED ORAL at 10:46

## 2022-03-05 RX ADMIN — INSULIN LISPRO 4 UNITS: 100 INJECTION, SOLUTION INTRAVENOUS; SUBCUTANEOUS at 12:06

## 2022-03-05 RX ADMIN — SERTRALINE HYDROCHLORIDE 50 MG: 50 TABLET ORAL at 09:27

## 2022-03-05 RX ADMIN — BUSPIRONE HYDROCHLORIDE 5 MG: 5 TABLET ORAL at 09:27

## 2022-03-05 RX ADMIN — RIFAXIMIN 550 MG: 550 TABLET ORAL at 09:27

## 2022-03-05 RX ADMIN — RIFAXIMIN 550 MG: 550 TABLET ORAL at 21:10

## 2022-03-05 RX ADMIN — ATORVASTATIN CALCIUM 40 MG: 40 TABLET, FILM COATED ORAL at 21:10

## 2022-03-05 RX ADMIN — FUROSEMIDE 80 MG: 80 TABLET ORAL at 09:27

## 2022-03-05 RX ADMIN — POLYETHYLENE GLYCOL 3350 17 G: 17 POWDER, FOR SOLUTION ORAL at 09:32

## 2022-03-05 RX ADMIN — BUSPIRONE HYDROCHLORIDE 5 MG: 5 TABLET ORAL at 17:12

## 2022-03-05 RX ADMIN — INSULIN LISPRO 4 UNITS: 100 INJECTION, SOLUTION INTRAVENOUS; SUBCUTANEOUS at 17:13

## 2022-03-05 RX ADMIN — ACETAMINOPHEN 650 MG: 325 TABLET ORAL at 17:12

## 2022-03-05 RX ADMIN — INSULIN DETEMIR 15 UNITS: 100 INJECTION, SOLUTION SUBCUTANEOUS at 21:09

## 2022-03-05 RX ADMIN — Medication 10 ML: at 09:26

## 2022-03-05 RX ADMIN — POTASSIUM CHLORIDE 10 MEQ: 750 CAPSULE, EXTENDED RELEASE ORAL at 09:27

## 2022-03-05 RX ADMIN — LACTULOSE 30 G: 10 SOLUTION ORAL at 09:26

## 2022-03-05 RX ADMIN — LACTULOSE 30 G: 10 SOLUTION ORAL at 21:10

## 2022-03-05 RX ADMIN — SPIRONOLACTONE 100 MG: 100 TABLET ORAL at 09:26

## 2022-03-05 NOTE — PLAN OF CARE
Goal Outcome Evaluation:  Plan of Care Reviewed With: patient   Patient A&O  x4, in pleasant mood, very talkative. Has been reading and completing crossword puzzles. Bed alarm disabled at discretion of nurse and MD, pt has been up ambulating around room frequently and has been up in chair for all meals. Pt reports have 3 BMs throughout the shift, scheduled lactulose and miralax continued. Pt c/o frequent pain, MD aware, scheduled Tylenol and PRN Flexeril given. Pain unrelieved. BGs ranged from 181-255, insulin given per sliding scale. Oral hydration encouraged.       Progress: improving

## 2022-03-05 NOTE — PLAN OF CARE
Goal Outcome Evaluation:  Plan of Care Reviewed With: patient        Progress: no change     Problem: Adult Inpatient Plan of Care  Goal: Plan of Care Review  Outcome: Unable to Meet, Plan Revised  Goal: Patient-Specific Goal (Individualized)  Outcome: Unable to Meet, Plan Revised  Goal: Absence of Hospital-Acquired Illness or Injury  Outcome: Unable to Meet, Plan Revised  Goal: Optimal Comfort and Wellbeing  Outcome: Unable to Meet, Plan Revised  Goal: Readiness for Transition of Care  Outcome: Unable to Meet, Plan Revised     Problem: Diabetes Comorbidity  Goal: Blood Glucose Level Within Desired Range  Outcome: Unable to Meet, Plan Revised     Problem: Hypertension Comorbidity  Goal: Blood Pressure in Desired Range  Outcome: Unable to Meet, Plan Revised     Problem: Obstructive Sleep Apnea Risk or Actual (Comorbidity Management)  Goal: Unobstructed Breathing During Sleep  Outcome: Unable to Meet, Plan Revised     Problem: Pain Chronic (Persistent) (Comorbidity Management)  Goal: Acceptable Pain Control and Functional Ability  Outcome: Unable to Meet, Plan Revised     Problem: Skin Injury Risk Increased  Goal: Skin Health and Integrity  Outcome: Unable to Meet, Plan Revised     Problem: Fall Injury Risk  Goal: Absence of Fall and Fall-Related Injury  Outcome: Unable to Meet, Plan Revised

## 2022-03-05 NOTE — PROGRESS NOTES
[facility]   Hospitalist Progress Note  Date: 3/5/2022  Patient Name: Nic Nicolas  : 1965  MRN: [id number]  Date of admission: 2022      Subjective   Subjective     Chief Complaint:  Confusion     Summary: 56 y.o. male with a past history of morbid obesity, Ruiz cirrhosis, hepatic encephalopathy due to hyperammonemia, obstructive sleep apnea not being treated, insulin-dependent diabetes mellitus, GERD and history of GI bleeding, history of DVT and anxious depression.  He lives alone but is brought to the hospital by his sister with concerns that he has symptoms similar to those seen in the past when he had elevated ammonia levels.  His emergency department evaluation is negative for evidence of a stroke but he does have hyperammonemia with ammonia level of 183.  He is also noted to be very weak and thought unlikely to be able to manage living alone.  The Hospitalist service is asked to admit him for treatment of his hyperammonemia and evaluation for possible rehabilitation.  Mentation did improve with lactulose, however, he did have intermittent confusion during his hospital stay.  Nephrology was consulted during his stay, MRI and EEG were negative.    Interval Followup: Patient states he still feels a little bit foggy this morning but much better than yesterday.  He seems to be back to his baseline and did not have any word finding difficulty during our interaction.  He is still complaining of severe neck and back pain and is requesting anything that can help.  He also is requesting that the bed alarm be discontinued as he has been ambulating independently.  Denies chest pain or shortness of breath.    Review of Systems  All systems reviewed and negative unless otherwise stated under interval follow-up    Objective   Objective     Vitals:   Temp:  [97.3 °F (36.3 °C)-99 °F (37.2 °C)] 97.3 °F (36.3 °C)  Heart Rate:  [] 102  Resp:  [18-24] 20  BP: (115-133)/(47-69) 133/63  Physical Exam      Constitutional: Awake, alert, sitting up on the edge of the bed   Eyes: Pupils equal, sclerae anicteric, no conjunctival injection   HENT: NCAT, mucous membranes moist   Neck: Supple, no LAD   Respiratory: Clear to auscultation bilaterally, no W/R/R, nonlabored respirations    Cardiovascular: RRR, no murmurs,   Gastrointestinal: Positive bowel sounds, soft, nontender, nondistended   Musculoskeletal: No bilateral ankle edema, no clubbing or cyanosis to extremities   Psychiatric: Appropriate affect, cooperative   Neurologic: Alert and oriented x 3, strength symmetric in all extremities, Cranial Nerves grossly intact to confrontation, speech slightly slurred   Skin: No rashes     Result Review    Result Review:  I have personally reviewed the results from the time of this admission to 3/5/2022 14:08 EST and agree with these findings:  [x]  Laboratory sodium 127, potassium 4.0, bicarb 18, creatinine 1.74, bilirubin 1.9, WBC 6.9, hemoglobin 13, platelets 146  [x]  Microbiology urinalysis negative  [x]  Radiology chest x-ray personally reviewed without evidence of cardiopulmonary abnormality  []  EKG/Telemetry   []  Cardiology/Vascular   []  Pathology  []  Old records  []  Other:    Assessment/Plan   Assessment / Plan     Assessment/Plan:  IVELISSE  Hepatic encephalopathy secondary to hyperammonemia  Toxic encephalopathy secondary to opioid use  ROMO cirrhosis  Weakness/debility  History of DVT  IDDM  Hypertension  GERD  Anxiety  Depression  Chronic pain syndrome  History of TBI    Continue to monitor in the hospital for management of the above  Creatinine rising, will hold Lasix and Aldactone today, give 500 cc normal saline and monitor response  Continue lactulose 30 g 3 times daily.  Patient would benefit from 3-4 BMs daily.  Give lactulose enema today  Infectious work-up negative  Can use Tums, GI cocktail as needed.  Can use Bentyl as needed for abdominal cramping  Can use Flexeril as needed for back pain  Continue  appropriate home medications  PT/OT consulted, social work is working on placement  Trend renal function and electrolytes with a.m. CMP  Trend Hgb and WBC with a.m. CBC     Discussed plan with RN.    DVT prophylaxis:  Mechanical DVT prophylaxis orders are present.    CODE STATUS:   Level Of Support Discussed With: Patient  Code Status (Patient has no pulse and is not breathing): CPR (Attempt to Resuscitate)  Medical Interventions (Patient has pulse or is breathing): Full Support                Normal for race

## 2022-03-06 LAB
ALBUMIN SERPL-MCNC: 3.3 G/DL (ref 3.5–5.2)
ALBUMIN/GLOB SERPL: 1 G/DL
ALP SERPL-CCNC: 134 U/L (ref 39–117)
ALT SERPL W P-5'-P-CCNC: 28 U/L (ref 1–41)
ANION GAP SERPL CALCULATED.3IONS-SCNC: 10 MMOL/L (ref 5–15)
AST SERPL-CCNC: 46 U/L (ref 1–40)
BASOPHILS # BLD AUTO: 0.07 10*3/MM3 (ref 0–0.2)
BASOPHILS NFR BLD AUTO: 1.1 % (ref 0–1.5)
BILIRUB SERPL-MCNC: 1.3 MG/DL (ref 0–1.2)
BUN SERPL-MCNC: 37 MG/DL (ref 6–20)
BUN/CREAT SERPL: 25 (ref 7–25)
CALCIUM SPEC-SCNC: 9.1 MG/DL (ref 8.6–10.5)
CHLORIDE SERPL-SCNC: 96 MMOL/L (ref 98–107)
CO2 SERPL-SCNC: 20 MMOL/L (ref 22–29)
CREAT SERPL-MCNC: 1.48 MG/DL (ref 0.76–1.27)
DEPRECATED RDW RBC AUTO: 52.3 FL (ref 37–54)
EGFRCR SERPLBLD CKD-EPI 2021: 55.5 ML/MIN/1.73
EOSINOPHIL # BLD AUTO: 0.23 10*3/MM3 (ref 0–0.4)
EOSINOPHIL NFR BLD AUTO: 3.8 % (ref 0.3–6.2)
ERYTHROCYTE [DISTWIDTH] IN BLOOD BY AUTOMATED COUNT: 16 % (ref 12.3–15.4)
GLOBULIN UR ELPH-MCNC: 3.4 GM/DL
GLUCOSE BLDC GLUCOMTR-MCNC: 141 MG/DL (ref 70–99)
GLUCOSE BLDC GLUCOMTR-MCNC: 159 MG/DL (ref 70–99)
GLUCOSE BLDC GLUCOMTR-MCNC: 214 MG/DL (ref 70–99)
GLUCOSE SERPL-MCNC: 155 MG/DL (ref 65–99)
HCT VFR BLD AUTO: 29.7 % (ref 37.5–51)
HGB BLD-MCNC: 10.8 G/DL (ref 13–17.7)
IMM GRANULOCYTES # BLD AUTO: 0.02 10*3/MM3 (ref 0–0.05)
IMM GRANULOCYTES NFR BLD AUTO: 0.3 % (ref 0–0.5)
LYMPHOCYTES # BLD AUTO: 1.56 10*3/MM3 (ref 0.7–3.1)
LYMPHOCYTES NFR BLD AUTO: 25.4 % (ref 19.6–45.3)
MAGNESIUM SERPL-MCNC: 2.1 MG/DL (ref 1.6–2.6)
MCH RBC QN AUTO: 32.2 PG (ref 26.6–33)
MCHC RBC AUTO-ENTMCNC: 36.4 G/DL (ref 31.5–35.7)
MCV RBC AUTO: 88.7 FL (ref 79–97)
MONOCYTES # BLD AUTO: 0.78 10*3/MM3 (ref 0.1–0.9)
MONOCYTES NFR BLD AUTO: 12.7 % (ref 5–12)
NEUTROPHILS NFR BLD AUTO: 3.47 10*3/MM3 (ref 1.7–7)
NEUTROPHILS NFR BLD AUTO: 56.7 % (ref 42.7–76)
NRBC BLD AUTO-RTO: 0 /100 WBC (ref 0–0.2)
PLATELET # BLD AUTO: 106 10*3/MM3 (ref 140–450)
PMV BLD AUTO: 11.7 FL (ref 6–12)
POTASSIUM SERPL-SCNC: 4.4 MMOL/L (ref 3.5–5.2)
PROT SERPL-MCNC: 6.7 G/DL (ref 6–8.5)
RBC # BLD AUTO: 3.35 10*6/MM3 (ref 4.14–5.8)
SODIUM SERPL-SCNC: 126 MMOL/L (ref 136–145)
WBC NRBC COR # BLD: 6.13 10*3/MM3 (ref 3.4–10.8)

## 2022-03-06 PROCEDURE — 83735 ASSAY OF MAGNESIUM: CPT | Performed by: INTERNAL MEDICINE

## 2022-03-06 PROCEDURE — 63710000001 INSULIN LISPRO (HUMAN) PER 5 UNITS: Performed by: FAMILY MEDICINE

## 2022-03-06 PROCEDURE — 80053 COMPREHEN METABOLIC PANEL: CPT | Performed by: INTERNAL MEDICINE

## 2022-03-06 PROCEDURE — 63710000001 INSULIN DETEMIR PER 5 UNITS: Performed by: INTERNAL MEDICINE

## 2022-03-06 PROCEDURE — 99232 SBSQ HOSP IP/OBS MODERATE 35: CPT | Performed by: INTERNAL MEDICINE

## 2022-03-06 PROCEDURE — 82962 GLUCOSE BLOOD TEST: CPT

## 2022-03-06 PROCEDURE — 85025 COMPLETE CBC W/AUTO DIFF WBC: CPT | Performed by: INTERNAL MEDICINE

## 2022-03-06 RX ORDER — SPIRONOLACTONE 25 MG/1
100 TABLET ORAL
Status: DISCONTINUED | OUTPATIENT
Start: 2022-03-07 | End: 2022-03-09 | Stop reason: HOSPADM

## 2022-03-06 RX ORDER — FUROSEMIDE 40 MG/1
40 TABLET ORAL
Status: DISCONTINUED | OUTPATIENT
Start: 2022-03-07 | End: 2022-03-09 | Stop reason: HOSPADM

## 2022-03-06 RX ADMIN — BUSPIRONE HYDROCHLORIDE 5 MG: 5 TABLET ORAL at 17:12

## 2022-03-06 RX ADMIN — Medication 10 ML: at 09:18

## 2022-03-06 RX ADMIN — Medication 10 ML: at 22:08

## 2022-03-06 RX ADMIN — LACTULOSE 30 G: 10 SOLUTION ORAL at 09:17

## 2022-03-06 RX ADMIN — ACETAMINOPHEN 650 MG: 325 TABLET ORAL at 09:17

## 2022-03-06 RX ADMIN — INSULIN DETEMIR 15 UNITS: 100 INJECTION, SOLUTION SUBCUTANEOUS at 22:04

## 2022-03-06 RX ADMIN — ACETAMINOPHEN 650 MG: 325 TABLET ORAL at 17:12

## 2022-03-06 RX ADMIN — SERTRALINE HYDROCHLORIDE 50 MG: 50 TABLET ORAL at 09:19

## 2022-03-06 RX ADMIN — RIFAXIMIN 550 MG: 550 TABLET ORAL at 09:17

## 2022-03-06 RX ADMIN — BUSPIRONE HYDROCHLORIDE 5 MG: 5 TABLET ORAL at 22:05

## 2022-03-06 RX ADMIN — LACTULOSE 30 G: 10 SOLUTION ORAL at 22:04

## 2022-03-06 RX ADMIN — INSULIN LISPRO 3 UNITS: 100 INJECTION, SOLUTION INTRAVENOUS; SUBCUTANEOUS at 12:10

## 2022-03-06 RX ADMIN — POTASSIUM CHLORIDE 10 MEQ: 750 CAPSULE, EXTENDED RELEASE ORAL at 09:17

## 2022-03-06 RX ADMIN — BUSPIRONE HYDROCHLORIDE 5 MG: 5 TABLET ORAL at 09:17

## 2022-03-06 RX ADMIN — ACETAMINOPHEN 650 MG: 325 TABLET ORAL at 22:03

## 2022-03-06 RX ADMIN — AMLODIPINE BESYLATE 10 MG: 5 TABLET ORAL at 09:17

## 2022-03-06 RX ADMIN — INSULIN LISPRO 2 UNITS: 100 INJECTION, SOLUTION INTRAVENOUS; SUBCUTANEOUS at 17:59

## 2022-03-06 RX ADMIN — RIFAXIMIN 550 MG: 550 TABLET ORAL at 22:05

## 2022-03-06 RX ADMIN — ATORVASTATIN CALCIUM 40 MG: 40 TABLET, FILM COATED ORAL at 22:03

## 2022-03-06 RX ADMIN — LIDOCAINE 2 PATCH: 50 PATCH CUTANEOUS at 09:18

## 2022-03-06 RX ADMIN — LACTULOSE 30 G: 10 SOLUTION ORAL at 17:13

## 2022-03-06 RX ADMIN — PANTOPRAZOLE SODIUM 40 MG: 40 TABLET, DELAYED RELEASE ORAL at 06:03

## 2022-03-06 RX ADMIN — POLYETHYLENE GLYCOL 3350 17 G: 17 POWDER, FOR SOLUTION ORAL at 09:17

## 2022-03-06 NOTE — NURSING NOTE
"Upon entering room, pt alert only to self. Per night shift RN, pt had previously been A&O x4 but had received a PRN dose of pain meds early this morning. Patient could not verbalize date/time or location, was lethargic, had some difficulty verbalizing his thoughts and was slow to respond to questions. Pupils reactive to light, response sluggish. Patient stated, \"I feel okay but something just feels off.\" Stroke RRT called, MD notified. CT Head negative for acute changes. Patient monitored closely following RRT, lactulose continued per eMAR, labs drawn including ammonia level, PRN opioids discontinued per MD.  "

## 2022-03-06 NOTE — PLAN OF CARE
Goal Outcome Evaluation:    Patient vital signs within expected rages at this time. Glucoses monitored and supplemental insulin given. No new orders at this time.

## 2022-03-06 NOTE — PLAN OF CARE
Goal Outcome Evaluation:  No acute issues today, vitals stable on room air. Scheduled lactulose as ordered, BM x3 today.

## 2022-03-06 NOTE — PROGRESS NOTES
[facility]   Hospitalist Progress Note  Date: 3/6/2022  Patient Name: Nic Nicolas  : 1965  MRN: [id number]  Date of admission: 2022      Subjective   Subjective     Chief Complaint:  Confusion     Summary: 56 y.o. male with a past history of morbid obesity, Ruiz cirrhosis, hepatic encephalopathy due to hyperammonemia, obstructive sleep apnea not being treated, insulin-dependent diabetes mellitus, GERD and history of GI bleeding, history of DVT and anxious depression.  He lives alone but is brought to the hospital by his sister with concerns that he has symptoms similar to those seen in the past when he had elevated ammonia levels.  His emergency department evaluation is negative for evidence of a stroke but he does have hyperammonemia with ammonia level of 183.  He is also noted to be very weak and thought unlikely to be able to manage living alone.  The Hospitalist service is asked to admit him for treatment of his hyperammonemia and evaluation for possible rehabilitation.  Mentation did improve with lactulose, however, he did have intermittent confusion during his hospital stay.  Nephrology was consulted during his stay, MRI and EEG were negative.  Mentation improved after he was having 3-4 bowel movements daily.  PT/OT recommended rehab.    Interval Followup: No acute events overnight.  Patient states he is actually feeling a lot better this morning.  Not back to 100% but much better than before.  States he is feeling better after his diuretics were held but he also realizes the importance of these.  No chest pain or shortness of air.  Still has back and neck pain but tolerable.    Review of Systems  All systems reviewed and negative unless otherwise stated under interval follow-up    Objective   Objective     Vitals:   Temp:  [97.5 °F (36.4 °C)-98.2 °F (36.8 °C)] 98.2 °F (36.8 °C)  Heart Rate:  [] 87  Resp:  [20] 20  BP: (114-158)/(44-83) 114/44  Physical Exam     Constitutional:  Awake, alert, sitting in bedside chair   Eyes: Pupils equal, sclerae anicteric, no conjunctival injection   HENT: NCAT, mucous membranes moist   Neck: Supple, no LAD   Respiratory: Clear to auscultation bilaterally, no w/r/r, nonlabored respirations    Cardiovascular: RRR, no murmurs, rubs or gallops   Gastrointestinal: Positive bowel sounds, soft, nontender, nondistended   Musculoskeletal: No bilateral ankle edema, no clubbing or cyanosis to extremities   Psychiatric: Appropriate affect, cooperative   Neurologic: Alert and oriented x 3, strength symmetric in all extremities, Cranial Nerves grossly intact to confrontation   Skin: No rashes     Result Review    Result Review:  I have personally reviewed the results from the time of this admission to 3/6/2022 13:08 EST and agree with these findings:  [x]  Laboratory sodium 126, potassium 4.4, bicarb 20, creatinine 1.48, alk phos 134, AST 46, ALT 28, WBC 6, hemoglobin 10.8, platelets 106  []  Microbiology   []  Radiology   []  EKG/Telemetry   []  Cardiology/Vascular   []  Pathology  []  Old records  []  Other:    Assessment/Plan   Assessment / Plan     Assessment/Plan:  IVELISSE, likely prerenal  Hepatic encephalopathy secondary to hyperammonemia  Toxic encephalopathy secondary to opioid use  ROMO cirrhosis  Weakness/debility  History of DVT  IDDM  Hypertension  GERD  Anxiety  Depression  Chronic pain syndrome  History of TBI    Continue to monitor in the hospital for management of the above  Renal function improved after holding diuretics and giving a small bolus  Hold diuretics today but will restart spironolactone and Lasix tomorrow  Continue lactulose 30 g 3 times daily.  Patient would benefit from 3-4 BMs daily  Infectious work-up negative  Can use Tums, GI cocktail as needed.  Can use Bentyl as needed for abdominal cramping  Can use Flexeril as needed for back pain  Continue appropriate home medications  PT/OT consulted, social work is working on placement  Trend  renal function and electrolytes with a.m. CMP  Trend Hgb and WBC with a.m. CBC     Discussed plan with RN.    DVT prophylaxis:  Mechanical DVT prophylaxis orders are present.    CODE STATUS:   Level Of Support Discussed With: Patient  Code Status (Patient has no pulse and is not breathing): CPR (Attempt to Resuscitate)  Medical Interventions (Patient has pulse or is breathing): Full Support

## 2022-03-07 ENCOUNTER — PATIENT OUTREACH (OUTPATIENT)
Dept: CASE MANAGEMENT | Facility: OTHER | Age: 57
End: 2022-03-07

## 2022-03-07 DIAGNOSIS — I10 HYPERTENSION, UNSPECIFIED TYPE: ICD-10-CM

## 2022-03-07 DIAGNOSIS — K76.82 HEPATIC ENCEPHALOPATHY: Primary | ICD-10-CM

## 2022-03-07 LAB
GLUCOSE BLDC GLUCOMTR-MCNC: 141 MG/DL (ref 70–99)
GLUCOSE BLDC GLUCOMTR-MCNC: 176 MG/DL (ref 70–99)
GLUCOSE BLDC GLUCOMTR-MCNC: 202 MG/DL (ref 70–99)
GLUCOSE BLDC GLUCOMTR-MCNC: 243 MG/DL (ref 70–99)
GLUCOSE BLDC GLUCOMTR-MCNC: 264 MG/DL (ref 70–99)

## 2022-03-07 PROCEDURE — 82962 GLUCOSE BLOOD TEST: CPT

## 2022-03-07 PROCEDURE — 25010000002 ONDANSETRON PER 1 MG: Performed by: FAMILY MEDICINE

## 2022-03-07 PROCEDURE — 25010000002 DICYCLOMINE PER 20 MG: Performed by: INTERNAL MEDICINE

## 2022-03-07 PROCEDURE — 63710000001 INSULIN DETEMIR PER 5 UNITS: Performed by: INTERNAL MEDICINE

## 2022-03-07 PROCEDURE — 63710000001 INSULIN LISPRO (HUMAN) PER 5 UNITS: Performed by: FAMILY MEDICINE

## 2022-03-07 PROCEDURE — 99232 SBSQ HOSP IP/OBS MODERATE 35: CPT | Performed by: INTERNAL MEDICINE

## 2022-03-07 PROCEDURE — 63710000001 PROMETHAZINE PER 12.5 MG: Performed by: INTERNAL MEDICINE

## 2022-03-07 RX ADMIN — RIFAXIMIN 550 MG: 550 TABLET ORAL at 08:56

## 2022-03-07 RX ADMIN — RIFAXIMIN 550 MG: 550 TABLET ORAL at 21:08

## 2022-03-07 RX ADMIN — PROMETHAZINE HYDROCHLORIDE 12.5 MG: 12.5 TABLET ORAL at 17:16

## 2022-03-07 RX ADMIN — BUSPIRONE HYDROCHLORIDE 5 MG: 5 TABLET ORAL at 08:57

## 2022-03-07 RX ADMIN — FUROSEMIDE 40 MG: 40 TABLET ORAL at 08:57

## 2022-03-07 RX ADMIN — SPIRONOLACTONE 100 MG: 100 TABLET ORAL at 08:57

## 2022-03-07 RX ADMIN — AMLODIPINE BESYLATE 10 MG: 5 TABLET ORAL at 08:57

## 2022-03-07 RX ADMIN — FUROSEMIDE 40 MG: 40 TABLET ORAL at 17:16

## 2022-03-07 RX ADMIN — POLYETHYLENE GLYCOL 3350 17 G: 17 POWDER, FOR SOLUTION ORAL at 08:56

## 2022-03-07 RX ADMIN — INSULIN DETEMIR 15 UNITS: 100 INJECTION, SOLUTION SUBCUTANEOUS at 21:07

## 2022-03-07 RX ADMIN — PANTOPRAZOLE SODIUM 40 MG: 40 TABLET, DELAYED RELEASE ORAL at 07:40

## 2022-03-07 RX ADMIN — ONDANSETRON 4 MG: 2 INJECTION INTRAMUSCULAR; INTRAVENOUS at 19:51

## 2022-03-07 RX ADMIN — ACETAMINOPHEN 650 MG: 325 TABLET ORAL at 08:56

## 2022-03-07 RX ADMIN — SPIRONOLACTONE 100 MG: 100 TABLET ORAL at 17:16

## 2022-03-07 RX ADMIN — ATORVASTATIN CALCIUM 40 MG: 40 TABLET, FILM COATED ORAL at 21:08

## 2022-03-07 RX ADMIN — LACTULOSE 30 G: 10 SOLUTION ORAL at 21:09

## 2022-03-07 RX ADMIN — LACTULOSE 30 G: 10 SOLUTION ORAL at 08:57

## 2022-03-07 RX ADMIN — BUSPIRONE HYDROCHLORIDE 5 MG: 5 TABLET ORAL at 21:08

## 2022-03-07 RX ADMIN — BUSPIRONE HYDROCHLORIDE 5 MG: 5 TABLET ORAL at 16:29

## 2022-03-07 RX ADMIN — ACETAMINOPHEN 650 MG: 325 TABLET ORAL at 21:08

## 2022-03-07 RX ADMIN — Medication 10 ML: at 21:09

## 2022-03-07 RX ADMIN — LIDOCAINE 2 PATCH: 50 PATCH CUTANEOUS at 08:56

## 2022-03-07 RX ADMIN — INSULIN LISPRO 3 UNITS: 100 INJECTION, SOLUTION INTRAVENOUS; SUBCUTANEOUS at 12:32

## 2022-03-07 RX ADMIN — INSULIN LISPRO 3 UNITS: 100 INJECTION, SOLUTION INTRAVENOUS; SUBCUTANEOUS at 17:17

## 2022-03-07 RX ADMIN — POTASSIUM CHLORIDE 10 MEQ: 750 CAPSULE, EXTENDED RELEASE ORAL at 08:56

## 2022-03-07 RX ADMIN — LACTULOSE 30 G: 10 SOLUTION ORAL at 16:29

## 2022-03-07 RX ADMIN — ACETAMINOPHEN 650 MG: 325 TABLET ORAL at 16:29

## 2022-03-07 RX ADMIN — SERTRALINE HYDROCHLORIDE 50 MG: 50 TABLET ORAL at 08:57

## 2022-03-07 RX ADMIN — Medication 10 ML: at 08:57

## 2022-03-07 RX ADMIN — ONDANSETRON 4 MG: 2 INJECTION INTRAMUSCULAR; INTRAVENOUS at 12:32

## 2022-03-07 RX ADMIN — DICYCLOMINE HYDROCHLORIDE 20 MG: 20 INJECTION, SOLUTION INTRAMUSCULAR at 17:16

## 2022-03-07 NOTE — PLAN OF CARE
Goal Outcome Evaluation:    Patient vital signs within expected ranges at this time. Patient glucose 176. No additional questions or concerns at this time.

## 2022-03-07 NOTE — OUTREACH NOTE
AMBULATORY CASE MANAGEMENT NOTE    Name and Relationship of Patient/Support Person: Nic Nicolas Jackson -     [facility] Interim Update          Per chart review the patient is currently an inpatient at [facility]. Per chart notes the patient seem to be less alert while A&OX1 per chart all films are negative for Cardio pulmonary or cerebral concerns. Per chart note the patient may be placed into long term care . I reached out to the nursing staff at [facility] for a more conclusive update I was on hold for a considerable amount of time with no response. I will call back. Per chart review the patient has appointment with Gastro 3-8-22 I called the Gastro office to alert them the patient was hospitalized and they verbalized understanding.        Education Documentation  No documentation found.        KAYLA NARANJO  Ambulatory Case Management    3/7/2022, 09:51 EST

## 2022-03-07 NOTE — SIGNIFICANT NOTE
03/07/22 1013   Coping/Psychosocial   Additional Documentation Spiritual Care (Group)   Spiritual Care   Spiritual Care Source  initiative  (daily rounding)   Spiritual Care Follow-Up follow-up planned regularly for general support   Response to Spiritual Care emotion expressed;engaged in conversation;expressing self, indicated difficulty;receptive of support   Spiritual Care Interventions supportive conversation provided   Spiritual Care Visit Type initial  (introductions made and my role explained)   Spiritual Care Request coping/stress of illness support;spiritual/moral support   Receptivity to Spiritual Care visit welcomed   At time of visit pt is on 4NT. When asked about an advanced directive pt states that he as one. However, when I look it is not on file. Pt would benefit from an ACP consult.

## 2022-03-07 NOTE — PROGRESS NOTES
[facility]   Hospitalist Progress Note  Date: 3/7/2022  Patient Name: Nic Nicolas  : 1965  MRN: [id number]  Date of admission: 2022      Subjective   Subjective     Chief Complaint:  Confusion     Summary: 56 y.o. male with a past history of morbid obesity, Ruiz cirrhosis, hepatic encephalopathy due to hyperammonemia, obstructive sleep apnea not being treated, insulin-dependent diabetes mellitus, GERD and history of GI bleeding, history of DVT and anxious depression.  He lives alone but is brought to the hospital by his sister with concerns that he has symptoms similar to those seen in the past when he had elevated ammonia levels.  His emergency department evaluation is negative for evidence of a stroke but he does have hyperammonemia with ammonia level of 183.  He is also noted to be very weak and thought unlikely to be able to manage living alone.  The Hospitalist service is asked to admit him for treatment of his hyperammonemia and evaluation for possible rehabilitation.  Mentation did improve with lactulose, however, he did have intermittent confusion during his hospital stay.  Nephrology was consulted during his stay, MRI and EEG were negative.  Mentation improved after he was having 3-4 bowel movements daily.  Cannot have any narcotics at discharge as they make his mentation much worse.  PT/OT recommended rehab.  Now pending placement.    Interval Followup: No acute events overnight.  Continues to feel better daily.  Appetite has been good.  Swelling multiple loose bowel movements daily but he agrees of the more bowel movements he has the better his mentation remains.    Review of Systems  All systems reviewed and negative unless otherwise stated under interval follow-up    Objective   Objective     Vitals:   Temp:  [97.3 °F (36.3 °C)-98.6 °F (37 °C)] 98.2 °F (36.8 °C)  Heart Rate:  [] 100  Resp:  [18-22] 22  BP: (122-141)/(55-69) 139/63  Physical Exam     Constitutional: Awake,  alert, sitting in bedside chair   Eyes: Pupils equal, sclerae anicteric, no conjunctival injection   HENT: NCAT, mucous membranes moist   Neck: Supple, no LAD   Respiratory: Clear to auscultation bilaterally, no w/r/r, nonlabored respirations    Cardiovascular: RRR, no murmurs, rubs or gallops   Gastrointestinal: Positive bowel sounds, soft, nontender, nondistended   Musculoskeletal: No bilateral ankle edema, no clubbing or cyanosis to extremities   Psychiatric: Appropriate affect, cooperative   Neurologic: Alert and oriented x 3, strength symmetric in all extremities, Cranial Nerves grossly intact to confrontation   Skin: No rashes     Result Review    Result Review:  I have personally reviewed the results from the time of this admission to 3/7/2022 14:09 EST and agree with these findings:  [x]  Laboratory all labs reviewed  []  Microbiology   []  Radiology   []  EKG/Telemetry   []  Cardiology/Vascular   []  Pathology  []  Old records  []  Other:    Assessment/Plan   Assessment / Plan     Assessment/Plan:  IVELISSE, likely prerenal  Hepatic encephalopathy secondary to hyperammonemia  Toxic encephalopathy secondary to opioid use  ROMO cirrhosis  Weakness/debility  History of DVT  IDDM  Hypertension  GERD  Anxiety  Depression  Chronic pain syndrome  History of TBI    Continue to monitor in the hospital for management of the above  Restart oral Lasix and spironolactone today and monitor renal function in the morning  Continue lactulose 30 g 3 times daily.  Patient benefits from 3-4 BMs daily  Can use Flexeril as needed for back pain.  Continue to avoid narcotics now to discharge  Continue appropriate home medications  PT/OT consulted, social work is working on placement  Trend renal function and electrolytes with a.m. CMP  Trend Hgb and WBC with a.m. CBC     Discussed plan with RN.    DVT prophylaxis:  Mechanical DVT prophylaxis orders are present.    CODE STATUS:   Level Of Support Discussed With: Patient  Code Status  (Patient has no pulse and is not breathing): CPR (Attempt to Resuscitate)  Medical Interventions (Patient has pulse or is breathing): Full Support

## 2022-03-07 NOTE — PLAN OF CARE
Goal Outcome Evaluation:               Patient alert and able to make needs known. Continually complaining of pain in his abdomen. Give phenergan and [place] just now. Had Zofran earlier. Patient has frequent request, pleasant. Eating well. Up ad myrtle in room. Tolerating well.

## 2022-03-08 ENCOUNTER — PATIENT OUTREACH (OUTPATIENT)
Dept: CASE MANAGEMENT | Facility: OTHER | Age: 57
End: 2022-03-08

## 2022-03-08 DIAGNOSIS — R18.8 CIRRHOSIS OF LIVER WITH ASCITES, UNSPECIFIED HEPATIC CIRRHOSIS TYPE: Primary | ICD-10-CM

## 2022-03-08 DIAGNOSIS — K76.82 HEPATIC ENCEPHALOPATHY: ICD-10-CM

## 2022-03-08 DIAGNOSIS — K74.60 CIRRHOSIS OF LIVER WITH ASCITES, UNSPECIFIED HEPATIC CIRRHOSIS TYPE: Primary | ICD-10-CM

## 2022-03-08 DIAGNOSIS — I10 HYPERTENSION, UNSPECIFIED TYPE: ICD-10-CM

## 2022-03-08 LAB
ALBUMIN SERPL-MCNC: 3.1 G/DL (ref 3.5–5.2)
ALBUMIN/GLOB SERPL: 1 G/DL
ALP SERPL-CCNC: 154 U/L (ref 39–117)
ALT SERPL W P-5'-P-CCNC: 37 U/L (ref 1–41)
ANION GAP SERPL CALCULATED.3IONS-SCNC: 9.2 MMOL/L (ref 5–15)
AST SERPL-CCNC: 57 U/L (ref 1–40)
BASOPHILS # BLD AUTO: 0.05 10*3/MM3 (ref 0–0.2)
BASOPHILS NFR BLD AUTO: 1.1 % (ref 0–1.5)
BILIRUB SERPL-MCNC: 1.1 MG/DL (ref 0–1.2)
BUN SERPL-MCNC: 32 MG/DL (ref 6–20)
BUN/CREAT SERPL: 22.5 (ref 7–25)
CALCIUM SPEC-SCNC: 9.1 MG/DL (ref 8.6–10.5)
CHLORIDE SERPL-SCNC: 101 MMOL/L (ref 98–107)
CO2 SERPL-SCNC: 19.8 MMOL/L (ref 22–29)
CREAT SERPL-MCNC: 1.42 MG/DL (ref 0.76–1.27)
DEPRECATED RDW RBC AUTO: 54.5 FL (ref 37–54)
EGFRCR SERPLBLD CKD-EPI 2021: 58.4 ML/MIN/1.73
EOSINOPHIL # BLD AUTO: 0.2 10*3/MM3 (ref 0–0.4)
EOSINOPHIL NFR BLD AUTO: 4.4 % (ref 0.3–6.2)
ERYTHROCYTE [DISTWIDTH] IN BLOOD BY AUTOMATED COUNT: 16.1 % (ref 12.3–15.4)
GLOBULIN UR ELPH-MCNC: 3.1 GM/DL
GLUCOSE BLDC GLUCOMTR-MCNC: 120 MG/DL (ref 70–99)
GLUCOSE BLDC GLUCOMTR-MCNC: 213 MG/DL (ref 70–99)
GLUCOSE BLDC GLUCOMTR-MCNC: 244 MG/DL (ref 70–99)
GLUCOSE SERPL-MCNC: 134 MG/DL (ref 65–99)
HCT VFR BLD AUTO: 29.8 % (ref 37.5–51)
HGB BLD-MCNC: 10.6 G/DL (ref 13–17.7)
IMM GRANULOCYTES # BLD AUTO: 0.02 10*3/MM3 (ref 0–0.05)
IMM GRANULOCYTES NFR BLD AUTO: 0.4 % (ref 0–0.5)
LYMPHOCYTES # BLD AUTO: 1.26 10*3/MM3 (ref 0.7–3.1)
LYMPHOCYTES NFR BLD AUTO: 27.5 % (ref 19.6–45.3)
MAGNESIUM SERPL-MCNC: 2.2 MG/DL (ref 1.6–2.6)
MCH RBC QN AUTO: 32.5 PG (ref 26.6–33)
MCHC RBC AUTO-ENTMCNC: 35.6 G/DL (ref 31.5–35.7)
MCV RBC AUTO: 91.4 FL (ref 79–97)
MONOCYTES # BLD AUTO: 0.67 10*3/MM3 (ref 0.1–0.9)
MONOCYTES NFR BLD AUTO: 14.6 % (ref 5–12)
NEUTROPHILS NFR BLD AUTO: 2.39 10*3/MM3 (ref 1.7–7)
NEUTROPHILS NFR BLD AUTO: 52 % (ref 42.7–76)
NRBC BLD AUTO-RTO: 0 /100 WBC (ref 0–0.2)
PLATELET # BLD AUTO: 76 10*3/MM3 (ref 140–450)
PMV BLD AUTO: 11.1 FL (ref 6–12)
POTASSIUM SERPL-SCNC: 4.6 MMOL/L (ref 3.5–5.2)
PROT SERPL-MCNC: 6.2 G/DL (ref 6–8.5)
RBC # BLD AUTO: 3.26 10*6/MM3 (ref 4.14–5.8)
SARS-COV-2 RNA PNL SPEC NAA+PROBE: NOT DETECTED
SODIUM SERPL-SCNC: 130 MMOL/L (ref 136–145)
WBC NRBC COR # BLD: 4.59 10*3/MM3 (ref 3.4–10.8)

## 2022-03-08 PROCEDURE — 63710000001 INSULIN LISPRO (HUMAN) PER 5 UNITS: Performed by: FAMILY MEDICINE

## 2022-03-08 PROCEDURE — 25010000002 ONDANSETRON PER 1 MG: Performed by: FAMILY MEDICINE

## 2022-03-08 PROCEDURE — 63710000001 PROMETHAZINE PER 12.5 MG: Performed by: INTERNAL MEDICINE

## 2022-03-08 PROCEDURE — 85025 COMPLETE CBC W/AUTO DIFF WBC: CPT | Performed by: INTERNAL MEDICINE

## 2022-03-08 PROCEDURE — 97116 GAIT TRAINING THERAPY: CPT

## 2022-03-08 PROCEDURE — U0004 COV-19 TEST NON-CDC HGH THRU: HCPCS | Performed by: INTERNAL MEDICINE

## 2022-03-08 PROCEDURE — 80053 COMPREHEN METABOLIC PANEL: CPT | Performed by: INTERNAL MEDICINE

## 2022-03-08 PROCEDURE — 99232 SBSQ HOSP IP/OBS MODERATE 35: CPT | Performed by: INTERNAL MEDICINE

## 2022-03-08 PROCEDURE — 82962 GLUCOSE BLOOD TEST: CPT

## 2022-03-08 PROCEDURE — 63710000001 INSULIN DETEMIR PER 5 UNITS: Performed by: INTERNAL MEDICINE

## 2022-03-08 PROCEDURE — 83735 ASSAY OF MAGNESIUM: CPT | Performed by: INTERNAL MEDICINE

## 2022-03-08 PROCEDURE — 97164 PT RE-EVAL EST PLAN CARE: CPT

## 2022-03-08 PROCEDURE — 25010000002 DICYCLOMINE PER 20 MG: Performed by: INTERNAL MEDICINE

## 2022-03-08 RX ORDER — BUSPIRONE HYDROCHLORIDE 5 MG/1
5 TABLET ORAL 2 TIMES DAILY PRN
Status: DISCONTINUED | OUTPATIENT
Start: 2022-03-08 | End: 2022-03-09 | Stop reason: HOSPADM

## 2022-03-08 RX ADMIN — POTASSIUM CHLORIDE 10 MEQ: 750 CAPSULE, EXTENDED RELEASE ORAL at 09:22

## 2022-03-08 RX ADMIN — DICYCLOMINE HYDROCHLORIDE 20 MG: 20 INJECTION, SOLUTION INTRAMUSCULAR at 01:15

## 2022-03-08 RX ADMIN — INSULIN DETEMIR 15 UNITS: 100 INJECTION, SOLUTION SUBCUTANEOUS at 20:59

## 2022-03-08 RX ADMIN — LACTULOSE 30 G: 10 SOLUTION ORAL at 20:57

## 2022-03-08 RX ADMIN — LACTULOSE 30 G: 10 SOLUTION ORAL at 17:27

## 2022-03-08 RX ADMIN — RIFAXIMIN 550 MG: 550 TABLET ORAL at 20:57

## 2022-03-08 RX ADMIN — LIDOCAINE 2 PATCH: 50 PATCH CUTANEOUS at 09:20

## 2022-03-08 RX ADMIN — ONDANSETRON 4 MG: 2 INJECTION INTRAMUSCULAR; INTRAVENOUS at 05:43

## 2022-03-08 RX ADMIN — FUROSEMIDE 40 MG: 40 TABLET ORAL at 09:22

## 2022-03-08 RX ADMIN — BUSPIRONE HYDROCHLORIDE 5 MG: 5 TABLET ORAL at 17:27

## 2022-03-08 RX ADMIN — ATORVASTATIN CALCIUM 40 MG: 40 TABLET, FILM COATED ORAL at 20:57

## 2022-03-08 RX ADMIN — POLYETHYLENE GLYCOL 3350 17 G: 17 POWDER, FOR SOLUTION ORAL at 09:20

## 2022-03-08 RX ADMIN — ACETAMINOPHEN 650 MG: 325 TABLET ORAL at 17:27

## 2022-03-08 RX ADMIN — ONDANSETRON 4 MG: 2 INJECTION INTRAMUSCULAR; INTRAVENOUS at 17:52

## 2022-03-08 RX ADMIN — LACTULOSE 30 G: 10 SOLUTION ORAL at 09:20

## 2022-03-08 RX ADMIN — INSULIN LISPRO 3 UNITS: 100 INJECTION, SOLUTION INTRAVENOUS; SUBCUTANEOUS at 17:28

## 2022-03-08 RX ADMIN — PROMETHAZINE HYDROCHLORIDE 12.5 MG: 12.5 TABLET ORAL at 01:15

## 2022-03-08 RX ADMIN — ACETAMINOPHEN 650 MG: 325 TABLET ORAL at 09:21

## 2022-03-08 RX ADMIN — SPIRONOLACTONE 100 MG: 100 TABLET ORAL at 17:27

## 2022-03-08 RX ADMIN — Medication 10 ML: at 09:21

## 2022-03-08 RX ADMIN — SPIRONOLACTONE 100 MG: 100 TABLET ORAL at 09:21

## 2022-03-08 RX ADMIN — Medication 10 ML: at 20:59

## 2022-03-08 RX ADMIN — RIFAXIMIN 550 MG: 550 TABLET ORAL at 09:21

## 2022-03-08 RX ADMIN — FUROSEMIDE 40 MG: 40 TABLET ORAL at 17:27

## 2022-03-08 RX ADMIN — AMLODIPINE BESYLATE 10 MG: 5 TABLET ORAL at 09:21

## 2022-03-08 RX ADMIN — BUSPIRONE HYDROCHLORIDE 5 MG: 5 TABLET ORAL at 09:22

## 2022-03-08 RX ADMIN — PANTOPRAZOLE SODIUM 40 MG: 40 TABLET, DELAYED RELEASE ORAL at 05:43

## 2022-03-08 RX ADMIN — SERTRALINE HYDROCHLORIDE 50 MG: 50 TABLET ORAL at 09:22

## 2022-03-08 RX ADMIN — INSULIN LISPRO 3 UNITS: 100 INJECTION, SOLUTION INTRAVENOUS; SUBCUTANEOUS at 13:06

## 2022-03-08 RX ADMIN — ACETAMINOPHEN 650 MG: 325 TABLET ORAL at 20:57

## 2022-03-08 NOTE — OUTREACH NOTE
AMBULATORY CASE MANAGEMENT NOTE    Name and Relationship of Patient/Support Person:  -     CCM Interim Update      Per chart patient still admitted to [facility] 4022 , contact made with primary nurse who stated that no estimated day for DC has been discussed, unsure of possible placement at time of DC. States still C/O pain and nausea . Current labs indicate Magnesium level WNL   Magnesium   1.6 - 2.6 mg/dL 2.2     Please refer to chart for further labs.     Will follow up on 3-11-22.      Education Documentation  No documentation found.        KAYLA NRAANJO  Ambulatory Case Management    3/8/2022, 12:23 EST
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
## 2022-03-08 NOTE — PLAN OF CARE
Goal Outcome Evaluation:  Per EMR, patient ambulating ad myrtle within the room. During re-evaluation patient ambulated 800' with rolling walker. Patient demonstrated no balance or safety deficits. Patient safe to continue ambulating within the room as tolerated. Patient has met all functional mobility goals and will be discharged from PT caseload this date.

## 2022-03-08 NOTE — PLAN OF CARE
Goal Outcome Evaluation:  Plan of Care Reviewed With: patient           Outcome Evaluation: Patient A/OX4 during shift. Pleasant and cooperative. C/O abdominal pain and nausea throughout shift. PRN medication given per MAR. VS stable. Having frequent bowel movements after administration of Lactulose.

## 2022-03-08 NOTE — PROGRESS NOTES
[facility]   Hospitalist Progress Note  Date: 3/8/2022  Patient Name: Nic Nicolas  : 1965  MRN: [id number]  Date of admission: 2022      Subjective   Subjective     Chief Complaint:  Confusion     Summary: 56 y.o. male with a past history of morbid obesity, Ruiz cirrhosis, hepatic encephalopathy due to hyperammonemia, obstructive sleep apnea not being treated, insulin-dependent diabetes mellitus, GERD and history of GI bleeding, history of DVT and anxious depression.  He lives alone but is brought to the hospital by his sister with concerns that he has symptoms similar to those seen in the past when he had elevated ammonia levels.  His emergency department evaluation is negative for evidence of a stroke but he does have hyperammonemia with ammonia level of 183.  He is also noted to be very weak and thought unlikely to be able to manage living alone.  The Hospitalist service is asked to admit him for treatment of his hyperammonemia and evaluation for possible rehabilitation.  Mentation did improve with lactulose, however, he did have intermittent confusion during his hospital stay.  Nephrology was consulted during his stay, MRI and EEG were negative.  Mentation improved after he was having 3-4 bowel movements daily.  Cannot have any narcotics at discharge as they make his mentation much worse.  PT/OT recommended rehab.  Now pending placement.    Interval Followup: No acute events overnight.  Continues to feel better daily.  Appetite has been good. Has had good bowel movements   Review of Systems  All systems reviewed and negative unless otherwise stated under interval follow-up    Objective   Objective     Vitals:   Temp:  [97.3 °F (36.3 °C)-98.5 °F (36.9 °C)] 97.3 °F (36.3 °C)  Heart Rate:  [] 89  Resp:  [14-18] 16  BP: (110-154)/(53-69) 110/55  Physical Exam     Constitutional: Awake, alert, sitting in bedside chair eating lunch    Eyes: Pupils equal, sclerae anicteric, no conjunctival  injection   HENT: NCAT, mucous membranes moist   Neck: Supple, no LAD   Respiratory: Clear to auscultation bilaterally, no w/r/r, nonlabored respirations    Cardiovascular: RRR, no murmurs, rubs or gallops   Gastrointestinal: Positive bowel sounds, soft, nontender, nondistended   Musculoskeletal: No bilateral ankle edema, no clubbing or cyanosis to extremities   Psychiatric: Appropriate affect, cooperative   Neurologic: Alert and oriented x 3, strength symmetric in all extremities, Cranial Nerves grossly intact to confrontation   Skin: No rashes     Result Review    Result Review:  I have personally reviewed the results from the time of this admission to 3/8/2022 13:00 EST and agree with these findings:  [x]  Laboratory   CBC    CBC 3/5/22 3/6/22 3/8/22   WBC 6.99 6.13 4.59   RBC 4.11 (A) 3.35 (A) 3.26 (A)   Hemoglobin 13.2 10.8 (A) 10.6 (A)   Hematocrit 36.8 (A) 29.7 (A) 29.8 (A)   MCV 89.5 88.7 91.4   MCH 32.1 32.2 32.5   MCHC 35.9 (A) 36.4 (A) 35.6   RDW 16.1 (A) 16.0 (A) 16.1 (A)   Platelets 146 106 (A) 76 (A)   (A) Abnormal value            BMP    BMP 3/5/22 3/6/22 3/8/22   BUN 38 (A) 37 (A) 32 (A)   Creatinine 1.74 (A) 1.48 (A) 1.42 (A)   Sodium 127 (A) 126 (A) 130 (A)   Potassium 4.0 4.4 4.6   Chloride 95 (A) 96 (A) 101   CO2 18.2 (A) 20.0 (A) 19.8 (A)   Calcium 9.9 9.1 9.1   (A) Abnormal value              [x]  Microbiology    Blood Culture   Date Value Ref Range Status   03/04/2022 No growth at 4 days  Preliminary     No results found for: BCIDPCR, CXREFLEX, CSFCX, CULTURETIS  No results found for: CULTURES, HSVCX, URCX  No results found for: EYECULTURE, GCCX, HSVCULTURE, LABHSV  No results found for: LEGIONELLA, MRSACX, MUMPSCX, MYCOPLASCX  No results found for: NOCARDIACX, STOOLCX  No results found for: THROATCX, UNSTIMCULT, URINECX, CULTURE, VZVCULTUR  No results found for: VIRALCULTU, WOUNDCX    []  Radiology   []  EKG/Telemetry   []  Cardiology/Vascular   []  Pathology  []  Old records  []   Other:    Assessment/Plan   Assessment / Plan     Assessment/Plan:  IVELISSE, likely prerenal  Hepatic encephalopathy secondary to hyperammonemia  Toxic 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  Patient benefits from 3-4 BMs daily  Can use Flexeril as needed for back pain.  Continue to avoid narcotics now to discharge  Continue appropriate home medications  PT/OT consulted, social work is working on placement  Trend renal function and electrolytes with a.m. CMP  Trend Hgb and WBC with a.m. CBC     Discussed plan with RN.    DVT prophylaxis:  Mechanical DVT prophylaxis orders are present.    CODE STATUS:   Level Of Support Discussed With: Patient  Code Status (Patient has no pulse and is not breathing): CPR (Attempt to Resuscitate)  Medical Interventions (Patient has pulse or is breathing): Full Support

## 2022-03-08 NOTE — PLAN OF CARE
Goal Outcome Evaluation:  Plan of Care Reviewed With: patient        Progress: no change  Outcome Evaluation: Patient is alert and oriented during shift and pleasant mostly, patient did have one episode of being frustrated due to pain and anxiety where he stated he wanted narcotics for pain, RN notified MD about issue as he was refusing his flexeril and bentyl and patient MD did not wish to give narcotics order at this time. RN explained to patient and patient was understandable. Patient has had Zofran once during shift and has had no signs or symptoms of distress, patient has call light within reach and is educated on how to use it.

## 2022-03-09 VITALS
DIASTOLIC BLOOD PRESSURE: 53 MMHG | SYSTOLIC BLOOD PRESSURE: 118 MMHG | RESPIRATION RATE: 20 BRPM | HEIGHT: 68 IN | TEMPERATURE: 97.2 F | HEART RATE: 90 BPM | BODY MASS INDEX: 35.82 KG/M2 | WEIGHT: 236.33 LBS | OXYGEN SATURATION: 100 %

## 2022-03-09 LAB
BACTERIA SPEC AEROBE CULT: NORMAL
BACTERIA SPEC AEROBE CULT: NORMAL
GLUCOSE BLDC GLUCOMTR-MCNC: 119 MG/DL (ref 70–99)
GLUCOSE BLDC GLUCOMTR-MCNC: 202 MG/DL (ref 70–99)

## 2022-03-09 PROCEDURE — 99239 HOSP IP/OBS DSCHRG MGMT >30: CPT | Performed by: INTERNAL MEDICINE

## 2022-03-09 PROCEDURE — 82962 GLUCOSE BLOOD TEST: CPT

## 2022-03-09 PROCEDURE — 63710000001 INSULIN LISPRO (HUMAN) PER 5 UNITS: Performed by: FAMILY MEDICINE

## 2022-03-09 PROCEDURE — 25010000002 ONDANSETRON PER 1 MG: Performed by: FAMILY MEDICINE

## 2022-03-09 RX ORDER — POTASSIUM CHLORIDE 750 MG/1
20 CAPSULE, EXTENDED RELEASE ORAL DAILY
Start: 2022-03-10 | End: 2022-04-12

## 2022-03-09 RX ORDER — FUROSEMIDE 40 MG/1
40 TABLET ORAL 2 TIMES DAILY
Start: 2022-03-09 | End: 2022-04-12 | Stop reason: SDUPTHER

## 2022-03-09 RX ADMIN — FUROSEMIDE 40 MG: 40 TABLET ORAL at 10:05

## 2022-03-09 RX ADMIN — ACETAMINOPHEN 650 MG: 325 TABLET ORAL at 10:04

## 2022-03-09 RX ADMIN — INSULIN LISPRO 3 UNITS: 100 INJECTION, SOLUTION INTRAVENOUS; SUBCUTANEOUS at 12:45

## 2022-03-09 RX ADMIN — POTASSIUM CHLORIDE 10 MEQ: 750 CAPSULE, EXTENDED RELEASE ORAL at 10:05

## 2022-03-09 RX ADMIN — SPIRONOLACTONE 100 MG: 100 TABLET ORAL at 10:05

## 2022-03-09 RX ADMIN — PANTOPRAZOLE SODIUM 40 MG: 40 TABLET, DELAYED RELEASE ORAL at 06:12

## 2022-03-09 RX ADMIN — SERTRALINE HYDROCHLORIDE 50 MG: 50 TABLET ORAL at 10:05

## 2022-03-09 RX ADMIN — Medication 10 ML: at 10:08

## 2022-03-09 RX ADMIN — LACTULOSE 30 G: 10 SOLUTION ORAL at 10:04

## 2022-03-09 RX ADMIN — POLYETHYLENE GLYCOL 3350 17 G: 17 POWDER, FOR SOLUTION ORAL at 10:04

## 2022-03-09 RX ADMIN — RIFAXIMIN 550 MG: 550 TABLET ORAL at 10:05

## 2022-03-09 RX ADMIN — AMLODIPINE BESYLATE 10 MG: 5 TABLET ORAL at 10:10

## 2022-03-09 RX ADMIN — ONDANSETRON 4 MG: 2 INJECTION INTRAMUSCULAR; INTRAVENOUS at 02:21

## 2022-03-09 NOTE — PLAN OF CARE
Problem: Adult Inpatient Plan of Care  Goal: Plan of Care Review  Outcome: Ongoing, Progressing  Flowsheets (Taken 3/9/2022 1259)  Progress: no change  Plan of Care Reviewed With: patient  Outcome Evaluation: Patient A/O x4. Vitals stable. No significant changes. Discharging to [facility].  Goal: Patient-Specific Goal (Individualized)  Outcome: Ongoing, Progressing  Goal: Absence of Hospital-Acquired Illness or Injury  Outcome: Ongoing, Progressing  Intervention: Identify and Manage Fall Risk  Recent Flowsheet Documentation  Taken 3/9/2022 1100 by Lizbet Lucia RNA  Safety Promotion/Fall Prevention: safety round/check completed  Taken 3/9/2022 0905 by Lizbet Luica RNA  Safety Promotion/Fall Prevention: safety round/check completed  Taken 3/9/2022 0705 by Lizbet Lucia RNA  Safety Promotion/Fall Prevention: safety round/check completed  Intervention: Prevent and Manage VTE (Venous Thromboembolism) Risk  Recent Flowsheet Documentation  Taken 3/9/2022 0845 by Lizbet Lucia RNA  Activity Management: activity adjusted per tolerance  Goal: Optimal Comfort and Wellbeing  Outcome: Ongoing, Progressing  Intervention: Provide Person-Centered Care  Recent Flowsheet Documentation  Taken 3/9/2022 0845 by Lizbet Lucia RNA  Trust Relationship/Rapport:   care explained   emotional support provided   questions answered   questions encouraged   thoughts/feelings acknowledged  Goal: Readiness for Transition of Care  Outcome: Ongoing, Progressing     Problem: Pain Acute  Goal: Acceptable Pain Control and Functional Ability  Outcome: Ongoing, Progressing     Problem: Fall Injury Risk  Goal: Absence of Fall and Fall-Related Injury  Outcome: Ongoing, Progressing  Intervention: Promote Injury-Free Environment  Recent Flowsheet Documentation  Taken 3/9/2022 1100 by Lizbet Lucia RNA  Safety Promotion/Fall Prevention: safety round/check completed  Taken 3/9/2022 0905 by Lizbet Lucia RNA  Safety  Promotion/Fall Prevention: safety round/check completed  Taken 3/9/2022 0705 by Lizbet Lucia RNA  Safety Promotion/Fall Prevention: safety round/check completed     Problem: Skin Injury Risk Increased  Goal: Skin Health and Integrity  Outcome: Ongoing, Progressing     Problem: Diabetes Comorbidity  Goal: Blood Glucose Level Within Targeted Range  Outcome: Ongoing, Progressing     Problem: Hypertension Comorbidity  Goal: Blood Pressure in Desired Range  Outcome: Ongoing, Progressing     Problem: Pain Chronic (Persistent) (Comorbidity Management)  Goal: Acceptable Pain Control and Functional Ability  Outcome: Ongoing, Progressing   Goal Outcome Evaluation:  Plan of Care Reviewed With: patient        Progress: no change  Outcome Evaluation: Patient A/O x4. Vitals stable. No significant changes. Discharging to [facility].

## 2022-03-09 NOTE — PLAN OF CARE
Goal Outcome Evaluation:      Patient have some complaints of abdominal cramp/ nausea overnight and medicated with zofran with relief.  Still having bowel movements frequently.  Breathing on RA. VSS. Continuing to monitor the patient.

## 2022-03-09 NOTE — CASE MANAGEMENT/SOCIAL WORK
Pt has a bed at [facility] today.    [facility]  [address] [place] [address] [phone]

## 2022-03-09 NOTE — DISCHARGE SUMMARY
[facility]         HOSPITALIST  DISCHARGE SUMMARY    Patient Name: Nic Nicolas  : 1965  MRN: [id number]    Date of Admission: 2022  Date of Discharge:  3/9/2022    Primary Care Physician: Jonh Franco Jr., MD    Consults     Date and Time Order Name Status Description    3/2/2022  9:19 AM Inpatient Neurology Consult Stroke      2022  4:47 PM Inpatient Gastroenterology Consult Completed     2022  3:41 PM Hospitalist (on-call MD unless specified)      2022 10:00 AM Hematology & Oncology Inpatient Consult Completed     2022 10:07 PM Inpatient Neurology Consult Stroke Completed     2022  9:53 PM Inpatient Neurology Consult Stroke Completed     2022  9:50 PM Inpatient Neurology Consult Stroke Completed           Active and Resolved Hospital Problems:  Active Hospital Problems    Diagnosis POA   • **Hepatic encephalopathy (HCC) [K72.90] Yes   • Thrombocytopenia (HCC) [D69.6] Yes   • Cirrhosis (HCC) [K74.60] Yes   • Sleep apnea [G47.30] Yes   • High blood pressure [I10] Yes   • Diabetes mellitus without complication (HCC) [E11.9] Yes      Resolved Hospital Problems   No resolved problems to display.       Hospital Course     Hospital Course:  Nic Nicolas is a 56 y.o. male  with a past history of morbid obesity, Ruiz cirrhosis, hepatic encephalopathy due to hyperammonemia, obstructive sleep apnea not being treated, insulin-dependent diabetes mellitus, GERD and history of GI bleeding, history of DVT and anxious depression.  He lives alone but is brought to the hospital by his sister with concerns that he has symptoms similar to those seen in the past when he had elevated ammonia levels.  His emergency department evaluation is negative for evidence of a stroke but he does have hyperammonemia with ammonia level of 183.  He is also noted to be very weak and thought unlikely to be able to manage living alone.  The Hospitalist service is asked to  admit him for treatment of his hyperammonemia and evaluation for possible rehabilitation.  Mentation did improve with lactulose, however, he did have intermittent confusion during his hospital stay.  Neurology  was consulted during his stay, MRI and EEG were negative.  Mentation improved after he was having 3-4 bowel movements daily.  Cannot have any narcotics at discharge as they make his mentation much worse.  Patient was accepted by [facility] in [place] for a Medicaid pending bed. Patient to discharge today in stable condition.          Day of Discharge     Vital Signs:  Temp:  [97.2 °F (36.2 °C)-98.2 °F (36.8 °C)] 97.2 °F (36.2 °C)  Heart Rate:  [] 90  Resp:  [16-20] 20  BP: (118-139)/(53-75) 118/53  Physical Exam:        Constitutional: Awake, alert, no distress               Eyes: Pupils equal, sclerae anicteric, no conjunctival injection              HENT: NCAT, mucous membranes moist              Neck: Supple, no LAD              Respiratory: Clear to auscultation bilaterally, no w/r/r, nonlabored respirations               Cardiovascular: RRR, no murmurs, rubs or gallops              Gastrointestinal: Positive bowel sounds, soft, nontender, nondistended              Musculoskeletal: No bilateral ankle edema, no clubbing or cyanosis to extremities              Psychiatric: Appropriate affect, cooperative              Neurologic: Alert and oriented x 3, strength symmetric in all extremities, Cranial Nerves grossly intact to confrontation              Skin: No rashes       Discharge Details        Discharge Medications      New Medications      Instructions Start Date   insulin detemir 100 UNIT/ML injection  Commonly known as: LEVEMIR  Replaces: Insulin Glargine 100 UNIT/ML injection pen   15 Units, Subcutaneous, Nightly      insulin lispro 100 UNIT/ML injection  Commonly known as: humaLOG   0-7 Units, Subcutaneous, 3 Times Daily Before Meals      potassium chloride 10 MEQ CR capsule  Commonly known as:  MICRO-K  Replaces: Potassium 99 MG tablet   20 mEq, Oral, Daily   Start Date: March 10, 2022        Changes to Medications      Instructions Start Date   furosemide 40 MG tablet  Commonly known as: LASIX  What changed: how much to take   40 mg, Oral, 2 Times Daily         Continue These Medications      Instructions Start Date   albuterol sulfate  (90 Base) MCG/ACT inhaler  Commonly known as: PROVENTIL HFA;VENTOLIN HFA;PROAIR HFA   2 puffs, Inhalation, Every 4 Hours PRN      atorvastatin 40 MG tablet  Commonly known as: LIPITOR   40 mg, Oral, Daily      busPIRone 5 MG tablet  Commonly known as: BUSPAR   5 mg, Oral, 3 Times Daily      lactulose 10 GM/15ML solution  Commonly known as: CHRONULAC   20 g, Oral, 3 Times Daily      omeprazole 20 MG capsule  Commonly known as: priLOSEC   40 mg, Oral, Daily      ondansetron ODT 4 MG disintegrating tablet  Commonly known as: ZOFRAN-ODT   4 mg, Sublingual, 4 Times Daily PRN      riFAXIMin 550 MG tablet  Commonly known as: XIFAXAN   550 mg, Oral, Every 12 Hours Scheduled      sertraline 50 MG tablet  Commonly known as: ZOLOFT   50 mg, Oral, Daily      spironolactone 100 MG tablet  Commonly known as: ALDACTONE   100 mg, Oral, 2 Times Daily      Vitamin B-2 100 MG tablet tablet  Commonly known as: RIBOFLAVIN   400 mg, Oral, Daily         Stop These Medications    Insulin Glargine 100 UNIT/ML injection pen  Commonly known as: LANTUS SOLOSTAR  Replaced by: insulin detemir 100 UNIT/ML injection     lidocaine 5 %  Commonly known as: LIDODERM     Potassium 99 MG tablet  Replaced by: potassium chloride 10 MEQ CR capsule        ASK your doctor about these medications      Instructions Start Date   amLODIPine 10 MG tablet  Commonly known as: NORVASC   10 mg, Oral, Daily             No Known Allergies    Discharge Disposition: [facility]       Diet:  Hospital:  Diet Order   Procedures   • Diet Regular; Consistent Carbohydrate       Discharge Activity: as tolerated        CODE STATUS:  Code Status and Medical Interventions:   Ordered at: 02/24/22 8847     Level Of Support Discussed With:    Patient     Code Status (Patient has no pulse and is not breathing):    CPR (Attempt to Resuscitate)     Medical Interventions (Patient has pulse or is breathing):    Full Support         Future Appointments   Date Time Provider Department Center   3/22/2022  4:00 PM Ayse Smith APRN [facility]   3/24/2022 11:00 AM Jonh Franco Jr., MD [facility]       Additional Instructions for the Follow-ups that You Need to Schedule     Discharge Follow-up with PCP   As directed       Currently Documented PCP:    Jonh Franco Jr., MD    PCP Phone Number:    [phone]     Follow Up Details: in 1-2 weeks               Pertinent  and/or Most Recent Results     PROCEDURES:   None     LAB RESULTS:      Lab 03/08/22  0521 03/06/22  0559 03/05/22  1037 03/04/22  0824 03/04/22  0536   WBC 4.59 6.13 6.99  --  6.07   HEMOGLOBIN 10.6* 10.8* 13.2  --  11.6*   HEMATOCRIT 29.8* 29.7* 36.8*  --  32.2*   PLATELETS 76* 106* 146  --  120*   NEUTROS ABS 2.39 3.47 4.59  --  3.41   IMMATURE GRANS (ABS) 0.02 0.02 0.02  --  0.02   LYMPHS ABS 1.26 1.56 1.24  --  1.56   MONOS ABS 0.67 0.78 0.96*  --  0.84   EOS ABS 0.20 0.23 0.09  --  0.17   MCV 91.4 88.7 89.5  --  88.2   LACTATE  --   --   --  1.8  --          Lab 03/08/22  0521 03/06/22  0559 03/05/22  1037 03/04/22  0536   SODIUM 130* 126* 127* 130*   POTASSIUM 4.6 4.4 4.0 4.0   CHLORIDE 101 96* 95* 96*   CO2 19.8* 20.0* 18.2* 22.2   ANION GAP 9.2 10.0 13.8 11.8   BUN 32* 37* 38* 37*   CREATININE 1.42* 1.48* 1.74* 1.37*   EGFR 58.4* 55.5* 45.7* 60.9   GLUCOSE 134* 155* 276* 161*   CALCIUM 9.1 9.1 9.9 9.7   MAGNESIUM 2.2 2.1  --  2.2   PHOSPHORUS  --   --   --  3.9         Lab 03/08/22  0521 03/06/22  0559 03/05/22  1037 03/04/22  0536   TOTAL PROTEIN 6.2 6.7 7.9 6.9   ALBUMIN 3.10* 3.30* 3.70 3.40*   GLOBULIN 3.1 3.4 4.2 3.5   ALT  (SGPT) 37 28 28 24   AST (SGOT) 57* 46* 45* 40   BILIRUBIN 1.1 1.3* 1.9* 1.6*   ALK PHOS 154* 134* 141* 135*                     Lab 03/04/22  0900   PH, ARTERIAL 7.505*   PCO2, ARTERIAL 29.0*   PO2 .5*   O2 SATURATION ART 97.0   FIO2 21   HCO3 ART 22.4   BASE EXCESS ART 0.3   CARBOXYHEMOGLOBIN 0.2     Brief Urine Lab Results  (Last result in the past 365 days)      Color   Clarity   Blood   Leuk Est   Nitrite   Protein   CREAT   Urine HCG        03/04/22 1635 Yellow   Clear   Negative   Negative   Negative   Negative               Microbiology Results (last 10 days)     Procedure Component Value - Date/Time    COVID PRE-OP / PRE-PROCEDURE SCREENING ORDER (NO ISOLATION) - Swab, Nasopharynx [[id number]]  (Normal) Collected: 03/08/22 1457    Lab Status: Final result Specimen: Swab from Nasopharynx Updated: 03/08/22 1932    Narrative:      The following orders were created for panel order COVID PRE-OP / PRE-PROCEDURE SCREENING ORDER (NO ISOLATION) - Swab, Nasopharynx.  Procedure                               Abnormality         Status                     ---------                               -----------         ------                     COVID-19,APTIMA PANTHER(...[[id number]]  Normal              Final result                 Please view results for these tests on the individual orders.    COVID-19,APTIMA PANTHER(TIFFANI),[facility], NP/OP SWAB IN UTM/VTM/SALINE TRANSPORT MEDIA,24 HR TAT - Swab, Nasopharynx [[id number]]  (Normal) Collected: 03/08/22 1457    Lab Status: Final result Specimen: Swab from Nasopharynx Updated: 03/08/22 1932     COVID19 Not Detected    Narrative:      Fact sheet for providers: https://www.fda.gov/media/396517/download     Fact sheet for patients: https://www.fda.gov/media/542084/download    Test performed by RT PCR.    Blood Culture - Blood, Arm, Left [[id number]]  (Normal) Collected: 03/04/22 1340    Lab Status: Preliminary result Specimen: Blood from Arm, Left Updated: 03/08/22 1347      Blood Culture No growth at 4 days    Blood Culture - Blood, Hand, Right [[id number]]  (Normal) Collected: 03/04/22 1337    Lab Status: Preliminary result Specimen: Blood from Hand, Right Updated: 03/08/22 1347     Blood Culture No growth at 4 days          MRI Brain Without Contrast    Result Date: 3/4/2022  Impression:    1. Motion limited study without evidence of acute ischemic change.  2. Atrophy and white matter changes compatible with small-vessel ischemic change and age related change given patient's stated age      KT LIN MD       Electronically Signed and Approved By: KT LIN MD on 3/04/2022 at 10:05             XR Chest 1 View    Result Date: 3/4/2022  Impression:   No acute cardiopulmonary process.        ERICKA SUBRAMANIAN MD       Electronically Signed and Approved By: ERICKA SUBRAMANIAN MD on 3/04/2022 at 14:57             XR Chest 1 View    Result Date: 2/24/2022  Impression:   1. No acute cardiopulmonary disease       Fili Jones M.D.       Electronically Signed and Approved By: Fili Jones M.D. on 2/24/2022 at 14:11             CT Head Without Contrast Stroke Protocol    Result Date: 2/24/2022  Impression:   CT scan of the head without IV contrast demonstrating no acute intracranial abnormality.  I discussed findings with Dr. Marino at [phone].     JENNIFER GALLARDO MD       Electronically Signed and Approved By: JENNIFER GALLARDO MD on 2/24/2022 at 13:35               Results for orders placed during the hospital encounter of 02/24/22    Duplex Carotid Ultrasound CAR    Interpretation Summary  · No significant stenosis is present in carotid bifurcations bilaterally.  · There are right mid internal carotid artery velocity elevations that would meet criteria for mild to moderate stenosis, however, this appears to be related to tortuosity in this region.  · No significant plaque in the bifurcations bilaterally.  · Vertebral flow is antegrade bilaterally.      Results for orders placed during the  hospital encounter of 02/24/22    Duplex Carotid Ultrasound CAR    Interpretation Summary  · No significant stenosis is present in carotid bifurcations bilaterally.  · There are right mid internal carotid artery velocity elevations that would meet criteria for mild to moderate stenosis, however, this appears to be related to tortuosity in this region.  · No significant plaque in the bifurcations bilaterally.  · Vertebral flow is antegrade bilaterally.      Results for orders placed during the hospital encounter of 01/19/22    Adult Transthoracic Echo Complete W/ Cont if Necessary Per Protocol (With Agitated Saline)    Interpretation Summary  · Saline test results are negative.  · Estimated left ventricular EF = 59% Left ventricular systolic function is normal.  · Left ventricular diastolic function was normal.      Labs Pending at Discharge:  Pending Labs     Order Current Status    Blood Culture - Blood, Arm, Left Preliminary result    Blood Culture - Blood, Hand, Right Preliminary result            Time spent on Discharge including face to face service:  More than 35 minutes    Electronically signed by Jay Bridges DO, 03/09/22, 11:17 AM EST.

## 2022-03-11 ENCOUNTER — PATIENT OUTREACH (OUTPATIENT)
Dept: CASE MANAGEMENT | Facility: OTHER | Age: 57
End: 2022-03-11

## 2022-03-11 DIAGNOSIS — K74.60 CIRRHOSIS OF LIVER WITH ASCITES, UNSPECIFIED HEPATIC CIRRHOSIS TYPE: Primary | ICD-10-CM

## 2022-03-11 DIAGNOSIS — R18.8 CIRRHOSIS OF LIVER WITH ASCITES, UNSPECIFIED HEPATIC CIRRHOSIS TYPE: Primary | ICD-10-CM

## 2022-03-11 DIAGNOSIS — K76.82 HEPATIC ENCEPHALOPATHY: ICD-10-CM

## 2022-03-11 DIAGNOSIS — I10 HYPERTENSION, UNSPECIFIED TYPE: ICD-10-CM

## 2022-03-11 NOTE — OUTREACH NOTE
AMBULATORY CASE MANAGEMENT NOTE    Name and Relationship of Patient/Support Person: Nic Nicolas - Self    CCM Interim Update        Patient dc'd from [facility] 3[facility] and [facility]. Rehab contacted stated he had extended episodes of vomiting upon arrival as well as consent complaints of pain and discomfort. No pain meds ordered for the patient . Patient will return to care of PCP at [facility] from rehab per staff. Name and number left with staff for follow up or info if required.         Education Documentation  No documentation found.        KAYLA NARANJO  Ambulatory Case Management    3/11/2022, 08:48 EST

## 2022-03-22 ENCOUNTER — TELEPHONE (OUTPATIENT)
Dept: NEUROLOGY | Facility: CLINIC | Age: 57
End: 2022-03-22

## 2022-03-22 NOTE — TELEPHONE ENCOUNTER
LM, we need to reschedule his appt for today with Ayse Smith.  She is not in office today.  We have appts available for Thurs & Friday this week.

## 2022-03-25 ENCOUNTER — PATIENT OUTREACH (OUTPATIENT)
Dept: CASE MANAGEMENT | Facility: OTHER | Age: 57
End: 2022-03-25

## 2022-03-25 DIAGNOSIS — K74.60 CIRRHOSIS OF LIVER WITH ASCITES, UNSPECIFIED HEPATIC CIRRHOSIS TYPE: ICD-10-CM

## 2022-03-25 DIAGNOSIS — R18.8 CIRRHOSIS OF LIVER WITH ASCITES, UNSPECIFIED HEPATIC CIRRHOSIS TYPE: ICD-10-CM

## 2022-03-25 DIAGNOSIS — K76.82 HEPATIC ENCEPHALOPATHY: Primary | ICD-10-CM

## 2022-03-25 NOTE — OUTREACH NOTE
AMBULATORY CASE MANAGEMENT NOTE    Name and Relationship of Patient/Support Person:  -     [facility] Interim Update        Patient is still located in  Rehabilitation and Skilled care @  [facility] [phone]. I called and was informed that the patient was now very confused and was not able to respond correctly. Patient not responding well to rehab modalities. No discussion of DC at this time. Long term care may be needed . RN and I discussed the possibility of a UTI due to the patients recent incontinence which can , in some cases, cause sudden onset of confusion. I will continue to follow up on patients progress. PCP advised.         Education Documentation  No documentation found.        KAYLA NARANJO  Ambulatory Case Management    3/25/2022, 10:40 EDT

## 2022-03-30 ENCOUNTER — PATIENT OUTREACH (OUTPATIENT)
Dept: CASE MANAGEMENT | Facility: OTHER | Age: 57
End: 2022-03-30

## 2022-03-30 DIAGNOSIS — R18.8 CIRRHOSIS OF LIVER WITH ASCITES, UNSPECIFIED HEPATIC CIRRHOSIS TYPE: Primary | ICD-10-CM

## 2022-03-30 DIAGNOSIS — K74.60 CIRRHOSIS OF LIVER WITH ASCITES, UNSPECIFIED HEPATIC CIRRHOSIS TYPE: Primary | ICD-10-CM

## 2022-03-30 DIAGNOSIS — I10 HYPERTENSION, UNSPECIFIED TYPE: ICD-10-CM

## 2022-03-30 PROCEDURE — 99439 CHRNC CARE MGMT STAF EA ADDL: CPT | Performed by: INTERNAL MEDICINE

## 2022-03-30 PROCEDURE — 99490 CHRNC CARE MGMT STAFF 1ST 20: CPT | Performed by: INTERNAL MEDICINE

## 2022-03-30 NOTE — OUTREACH NOTE
[facility] End of Month Documentation    This Chronic Medical Management Care Plan for Nic Nicolas, 56 y.o. male, has been monitored and managed; reviewed and a new plan of care implemented for the month of March.  A cumulative time of 58  minutes was spent on this patient record this month, including chart review; electronic communication with other providers; phone call with other providers; electronic communication with primary care provider, Chart review , call to [facility], call to [facility] (patient floor).    Regarding the patient's problems: has Arthritis, senescent; Body mass index (BMI) 40.0-44.9, adult (HCC); Colon polyps; Cirrhosis (HCC); Multiple episodes of deep venous thrombosis (HCC); High blood pressure; Hyperlipidemia; Pancytopenia (HCC); Sleep apnea; Systolic congestive heart failure (HCC); Bilateral lower extremity edema; Chronic cystitis; Chronic low back pain; Diabetes mellitus without complication (HCC); Acid reflux; Acetabular fracture (HCC); Gross hematuria; Hesitancy of micturition; Gastrointestinal hemorrhage associated with peptic ulcer; Anxiety; Hepatic encephalopathy (HCC); Stroke (HCC); and Thrombocytopenia (HCC) on their problem list., the following items were addressed: medical records; medications; referrals to community service providers, Patient admitted , follow up and any changes can be found within the plan section of the note.  A detailed listing of time spent for chronic care management is tracked within each outreach encounter.  Current medications include:  has a current medication list which includes the following prescription(s): albuterol sulfate hfa, atorvastatin, buspirone, furosemide, insulin detemir, insulin lispro, lactulose, omeprazole, ondansetron odt, potassium chloride, rifaximin, sertraline, spironolactone, and vitamin b-2. and the patient is reported to be patient is compliant with medication protocol, Complient with meds for DM2 only,  Medications are reported to be  effective, for A1c reduction. All notes on chart for PCP to review.    The patient was monitored remotely for blood pressure; blood glucose; mood & behavior; pain, poly diagnosis.    The patient's physical needs include:  help taking medications as prescribed; needs assistance with ADLs; resources for disability needs; physical healthcare; medication education; physician referral.     The patient's mental support needs include:  increased support; coordination of community providers    The patient's cognitive support needs include:  increased support; medication; health care; emotional care; supervision    The patient's psychosocial support needs include:  need for increased support    The patient's functional needs include: physician referral; medication education; resources for disability needs    The patient's environmental needs include:  no involvement in outside activities or no access to other activities; no access to transportation, N/A    Care Plan overall comments:  On going eval, Patient on hold    Refer to previous outreach notes for more information on the areas listed above.    Monthly Billing Diagnoses  (K74.60,  R18.8) Cirrhosis of liver with ascites, unspecified hepatic cirrhosis type (HCC)    (I10) Hypertension, unspecified type    Medications   · Medications have been reconciled    Care Plan progress this month:      Recently Modified Care Plans Updates made since 2/27/2022 12:00 AM     Wellness (Adult)         Problem Priority Last Modified     [facility] HEALTHY NUTRITION (WELLNESS) (ADULT) --  3/7/2022 10:15 AM by Fili Davies MA              Goal Recent Progress Last Modified     Healthy Nutrition Achieved --  3/7/2022 10:15 AM by Fili Davies MA     Evidence-based guidance:   Assess patient perspective on healthy weight, weight loss or weight gain, motivation and readiness for change.   Recommend or set healthy weight goal based on body mass index.   Review current dietary intake and exercise  "levels.   Encourage small steps toward making change to eating and exercising.   Provide individualized medical nutrition therapy.    Notes:              Task Due Date Last Modified     Alleviate Barriers to Healthy Eating --  3/7/2022 10:15 AM by Fili Davies MA                 Chronic Pain (Adult)         Problem Priority Last Modified     [facility] PAIN MANAGEMENT PLAN (CHRONIC PAIN) (ADULT) --  3/7/2022 10:16 AM by Fili Davies MA              Goal Recent Progress Last Modified     Pain Management Plan Developed --  3/7/2022 10:16 AM by Fili Davies MA     Evidence-based guidance:   Acknowledge patient as the expert in pain self-management.   Partner to set a comfort goal that allows for return to work, school, usual activity and acceptable quality of life.   Assess pain level, usual behavior with and without pain and symptoms that commonly occur with chronic pain, such as fatigue, sleep problems, cognitive and mood disturbance; use a consistent pain scale.   Determine if pain is associated with mobility or at rest, location, intensity, frequency, duration, recurrence, pattern, triggers, relieving factors and description, such as cramping, burning or aching.    Mutually develop an interdisciplinary, multi-modal and detailed pain management plan with patient and family or caregiver; track the patient's response to pain management and adjust plan as needed.    Notes:              Task Due Date Last Modified     Partner to Develop Chronic Pain Management Plan --  3/7/2022 10:16 AM by Fili Davies MA              Problem Priority Last Modified     ELS CHRONIC PAIN MANAGEMENT (CHRONIC PAIN) (ADULT) --  3/7/2022 10:16 AM by Fili Davies MA              Goal Recent Progress Last Modified     Chronic Pain Managed --  3/7/2022 10:16 AM by Fili Davies MA     Evidence-based guidance:   Address common beliefs about pain, such as pain is to be endured, a normal part of aging or that it is not \"real\"; feelings of " resignation that nothing can be done and that complaining will be a sign of weakness.   Assess pain level, treatment efficacy and patient response at regular intervals using a consistent pain scale.   Assess pain using self-report (most reliable), family/caregiver report, validated pain scale; consider impact on quality of life.   Determine if pain is associated with mobility or at rest, location, intensity, frequency, duration, recurrence, pattern and description (e.g., cramping, burning, aching), triggers and relieving factors.    Anticipate referral to pain education program, pain management support or community resources for specific diagnoses (e.g., cancer, fibromyalgia, multiple sclerosis).   Explore fears associated with anticipated or imagined pain; encourage acceptance-based approaches.   Encourage exposure to experiences previously avoided due to fear of pain.   Anticipate referral to pain management specialist, physical therapist, addiction specialist (if history of substance use), psychotherapist; advocate for consultation with pharmacist.   Initiate nonpharmacologic measures, such as cognitive behavior therapy, mindfulness, guided imagery, massage, distraction, relaxation, chiropractic manipulation, dietary supplements or acupuncture.   Provide multimodal treatment interventions, such as physical activity, therapeutic exercise, yoga, TENS (transcutaneous electrical nerve stimulation) and manual therapy.   Train in functional activity modifications, such as body mechanics, posture, ergonomics, energy conservation and activity pacing.   Encourage use of local anesthetic or analgesic therapy as an adjunct for pain control (e.g., lidocaine patch, capsaicin cream, topical nonsteroidal anti-inflammatory drugs).   Prepare patient for use of pharmacologic therapy in a stepped approach that may include acetaminophen, nonsteroidal anti-inflammatory drugs, opioid, antiepileptic, antidepressant or nonbenzodiazepine  muscle relaxant.   Review efficacy, tolerability, adherence and manage medication-induced side effects.    Notes:              Task Due Date Last Modified     Alleviate Barriers to Chronic Pain Management --  3/7/2022 10:16 AM by Fili Davies MA              Problem Priority Last Modified     ELS HARM OR INJURY (CHRONIC PAIN) (ADULT) --  3/7/2022 10:16 AM by Fili Davies MA              Goal Recent Progress Last Modified     Harm or Injury Prevented --  3/7/2022 10:16 AM by Fili Davies MA     Evidence-based guidance:   Address risk for personal injury, such as falls, motor vehicle accidents, self-harm due to medication side effects, compromised mobility or diminished perception, reasoning, decision-making and judgment.   Engage family in providing a safe home environment and closely monitoring changes in the patient's level of sedation and emotional state, such as negativity, hopelessness and suicidal ideation.   Explore suicidal tendencies compassionately, yet directly, by asking about suicidal ideation, attempt history and family history.   Maintain frequent, structured and supportive contact, such as by phone, office or home visit; collaborate closely with behavioral health specialists or psychiatry.   Make immediate arrangements for evaluation at emergency department, community mental health agency or other psychiatric service when patient expresses positive suicidal ideation with a plan and access to lethal means.   Promote fall risk prevention by making home and environmental adjustments; provide clear instructions regarding ability to drive.   Assess for opioid-induced constipation; provide anticipatory guidance regarding prevention when beginning opioid use by using stool softener or laxative, as well as increasing fluids and dietary fiber.   When opioid-induced constipation is noted, optimize lifestyle interventions and anticipate the use of opioid antagonist as well as tapering, discontinuation or  change of opioid.   Encourage frequent oral hygiene (brushing and rinsing), the use of xylitol-containing gum as well as sugar-free and decaffeinated beverages when dry mouth is reported.   Assess for signs and symptoms of opioid endocrinopathy, such as sexual dysfunction, decreased libido, osteoporosis, osteopenia or infertility.   When endocrinopathy is present, anticipate changing opioid medication, tapering or discontinuation of opioid or initiation of hormone supplementation.   Assess for signs/symptoms of sleep-disordered breathing.   Anticipate referral for sleep study and potential tapering or discontinuation of opioid and initiation of noninvasive positive pressure breathing or adaptive servo ventilation device.   Evaluate risk of opioid misuse prior to or early in treatment with opioids.   Provide anticipatory guidance regarding use and misuse of opioid medication, including not crushing pills, dissolving in juice or mixing with applesauce; consider change to crush-resistant opioid.   Complete periodic screening for opioid misuse and/or signs of substance tolerance (increased dose to reach desired effect, decreased effect with same dose).   Monitor drug-taking behaviors, such as hoarding medication, independently increasing dose, using for other than analgesia and seeing multiple physicians for prescriptions; review state prescription drug monitoring database.   Consider written agreement if patient has used opioids for more than 30 days or episodically over 1 year; required use of nonpharmacologic therapy, urine testing, pill counting, banning sharing or selling of opioids.   When tapering or stopping opioids, frame the discussion in terms of safety and efficacy; reaffirm commitment to patient's health and new treatment plan; respond to fear with empathy; maintain consistent message and approach.    Notes:              Task Due Date Last Modified     Identify and Reduce Risks for Harm or Injury --   "3/7/2022 10:16 AM by Fili Davies MA                 Chronic Pain (Adult)         Problem Priority Last Modified     ELS PAIN MANAGEMENT PLAN (CHRONIC PAIN) (ADULT) --  3/7/2022 10:45 AM by Fili Davies MA              Goal Recent Progress Last Modified     Pain Management Plan Developed --  3/7/2022 10:45 AM by Fili Davies MA     Evidence-based guidance:   Acknowledge patient as the expert in pain self-management.   Partner to set a comfort goal that allows for return to work, school, usual activity and acceptable quality of life.   Assess pain level, usual behavior with and without pain and symptoms that commonly occur with chronic pain, such as fatigue, sleep problems, cognitive and mood disturbance; use a consistent pain scale.   Determine if pain is associated with mobility or at rest, location, intensity, frequency, duration, recurrence, pattern, triggers, relieving factors and description, such as cramping, burning or aching.    Mutually develop an interdisciplinary, multi-modal and detailed pain management plan with patient and family or caregiver; track the patient's response to pain management and adjust plan as needed.    Notes:              Task Due Date Last Modified     Partner to Develop Chronic Pain Management Plan --  3/7/2022 10:46 AM by Fili Davies MA              Problem Priority Last Modified     [facility] CHRONIC PAIN MANAGEMENT (CHRONIC PAIN) (ADULT) --  3/7/2022 10:45 AM by Fili Davies MA              Goal Recent Progress Last Modified     Chronic Pain Managed --  3/7/2022 10:45 AM by Fili Davies MA     Evidence-based guidance:   Address common beliefs about pain, such as pain is to be endured, a normal part of aging or that it is not \"real\"; feelings of resignation that nothing can be done and that complaining will be a sign of weakness.   Assess pain level, treatment efficacy and patient response at regular intervals using a consistent pain scale.   Assess pain using self-report " (most reliable), family/caregiver report, validated pain scale; consider impact on quality of life.   Determine if pain is associated with mobility or at rest, location, intensity, frequency, duration, recurrence, pattern and description (e.g., cramping, burning, aching), triggers and relieving factors.    Anticipate referral to pain education program, pain management support or community resources for specific diagnoses (e.g., cancer, fibromyalgia, multiple sclerosis).   Explore fears associated with anticipated or imagined pain; encourage acceptance-based approaches.   Encourage exposure to experiences previously avoided due to fear of pain.   Anticipate referral to pain management specialist, physical therapist, addiction specialist (if history of substance use), psychotherapist; advocate for consultation with pharmacist.   Initiate nonpharmacologic measures, such as cognitive behavior therapy, mindfulness, guided imagery, massage, distraction, relaxation, chiropractic manipulation, dietary supplements or acupuncture.   Provide multimodal treatment interventions, such as physical activity, therapeutic exercise, yoga, TENS (transcutaneous electrical nerve stimulation) and manual therapy.   Train in functional activity modifications, such as body mechanics, posture, ergonomics, energy conservation and activity pacing.   Encourage use of local anesthetic or analgesic therapy as an adjunct for pain control (e.g., lidocaine patch, capsaicin cream, topical nonsteroidal anti-inflammatory drugs).   Prepare patient for use of pharmacologic therapy in a stepped approach that may include acetaminophen, nonsteroidal anti-inflammatory drugs, opioid, antiepileptic, antidepressant or nonbenzodiazepine muscle relaxant.   Review efficacy, tolerability, adherence and manage medication-induced side effects.    Notes:              Task Due Date Last Modified     Alleviate Barriers to Chronic Pain Management --  3/7/2022 10:47 AM by  Fili Davies MA              Problem Priority Last Modified     ELS HARM OR INJURY (CHRONIC PAIN) (ADULT) --  3/7/2022 10:45 AM by Fili Davies MA              Goal Recent Progress Last Modified     Harm or Injury Prevented --  3/7/2022 10:45 AM by Fili Davies MA     Evidence-based guidance:   Address risk for personal injury, such as falls, motor vehicle accidents, self-harm due to medication side effects, compromised mobility or diminished perception, reasoning, decision-making and judgment.   Engage family in providing a safe home environment and closely monitoring changes in the patient's level of sedation and emotional state, such as negativity, hopelessness and suicidal ideation.   Explore suicidal tendencies compassionately, yet directly, by asking about suicidal ideation, attempt history and family history.   Maintain frequent, structured and supportive contact, such as by phone, office or home visit; collaborate closely with behavioral health specialists or psychiatry.   Make immediate arrangements for evaluation at emergency department, community mental health agency or other psychiatric service when patient expresses positive suicidal ideation with a plan and access to lethal means.   Promote fall risk prevention by making home and environmental adjustments; provide clear instructions regarding ability to drive.   Assess for opioid-induced constipation; provide anticipatory guidance regarding prevention when beginning opioid use by using stool softener or laxative, as well as increasing fluids and dietary fiber.   When opioid-induced constipation is noted, optimize lifestyle interventions and anticipate the use of opioid antagonist as well as tapering, discontinuation or change of opioid.   Encourage frequent oral hygiene (brushing and rinsing), the use of xylitol-containing gum as well as sugar-free and decaffeinated beverages when dry mouth is reported.   Assess for signs and symptoms of opioid  endocrinopathy, such as sexual dysfunction, decreased libido, osteoporosis, osteopenia or infertility.   When endocrinopathy is present, anticipate changing opioid medication, tapering or discontinuation of opioid or initiation of hormone supplementation.   Assess for signs/symptoms of sleep-disordered breathing.   Anticipate referral for sleep study and potential tapering or discontinuation of opioid and initiation of noninvasive positive pressure breathing or adaptive servo ventilation device.   Evaluate risk of opioid misuse prior to or early in treatment with opioids.   Provide anticipatory guidance regarding use and misuse of opioid medication, including not crushing pills, dissolving in juice or mixing with applesauce; consider change to crush-resistant opioid.   Complete periodic screening for opioid misuse and/or signs of substance tolerance (increased dose to reach desired effect, decreased effect with same dose).   Monitor drug-taking behaviors, such as hoarding medication, independently increasing dose, using for other than analgesia and seeing multiple physicians for prescriptions; review state prescription drug monitoring database.   Consider written agreement if patient has used opioids for more than 30 days or episodically over 1 year; required use of nonpharmacologic therapy, urine testing, pill counting, banning sharing or selling of opioids.   When tapering or stopping opioids, frame the discussion in terms of safety and efficacy; reaffirm commitment to patient's health and new treatment plan; respond to fear with empathy; maintain consistent message and approach.    Notes:              Task Due Date Last Modified     Identify and Reduce Risks for Harm or Injury --  3/7/2022 10:45 AM by Fili Davies MA                      · Current Specialty Plan of Care Status in process    Instructions   · Patient was provided an electronic copy of care plan  · CCM services were explained and offered and  patient has accepted these services.  · Patient has given their written consent to receive CCM services and understands that this includes the authorization of electronic communication of medical information with the other treating providers.  · Patient understands that they may stop CCM services at any time and these changes will be effective at the end of the calendar month and will effectively revocate the agreement of CCM services.  · Patient understands that only one practitioner can furnish and be paid for CCM services during one calendar month.  Patient also understands that there may be co-payment or deductible fees in association with CCM services.  · Patient will continue with at least monthly follow-up calls with the Nurse Navigator.    KAYLA NARANJO  Ambulatory Case Management    3/30/2022, 15:23 EDT

## 2022-04-06 ENCOUNTER — READMISSION MANAGEMENT (OUTPATIENT)
Dept: CALL CENTER | Facility: HOSPITAL | Age: 57
End: 2022-04-06

## 2022-04-06 ENCOUNTER — TELEPHONE (OUTPATIENT)
Dept: INTERNAL MEDICINE | Facility: CLINIC | Age: 57
End: 2022-04-06

## 2022-04-06 NOTE — OUTREACH NOTE
Prep Survey    Flowsheet Row Responses   [facility] facility patient discharged from? Non-BH   Is LACE score < 7 ? Non-BH Discharge   Emergency Room discharge w/ pulse ox? No   Eligibility [facility] [place]   Date of Admission 03/11/22   Date of Discharge 04/07/22   Discharge diagnosis Hepatic Faliure   Does the patient have one of the following disease processes/diagnoses(primary or secondary)? Other   Prep survey completed? Yes          WINDY NARANJO - Registered Nurse

## 2022-04-06 NOTE — TELEPHONE ENCOUNTER
Caller: [facility]     Relationship to patient: REHAB PROVIDER    Best call back number: [phone]    New or established patient?  [x] New  [] Established    Date of discharge: 04/07/2022    Facility discharged from: [facility]    Diagnosis/Symptoms: HEPATIC FAILURE    Length of stay (If applicable): 29 DAYS

## 2022-04-08 ENCOUNTER — TRANSITIONAL CARE MANAGEMENT TELEPHONE ENCOUNTER (OUTPATIENT)
Dept: CALL CENTER | Facility: HOSPITAL | Age: 57
End: 2022-04-08

## 2022-04-08 ENCOUNTER — PATIENT OUTREACH (OUTPATIENT)
Dept: CASE MANAGEMENT | Facility: OTHER | Age: 57
End: 2022-04-08

## 2022-04-08 NOTE — OUTREACH NOTE
Call Center TCM Note    Flowsheet Row Responses   [facility] patient discharged from? Non-BH  [[place] ]   Does the patient have one of the following disease processes/diagnoses(primary or secondary)? Other   TCM attempt successful? Yes   Call start time 1244   Call end time 1259   Discharge diagnosis Hepatic Failure   Is patient permission given to speak with other caregiver? No   Meds reviewed with patient/caregiver? --  [Patient reports that he has a list from the rehab facility and that pharmacy is sending out meds today. ]   Does the patient have all medications ordered at discharge? No  [Patient reports that he doesn't have meds from pharmacy yet and has insurance problems. ]   Nursing Interventions --  [Patient to bring list from rehab facility and all meds he has to f/u appt on Tues. ]   Is the patient taking all medications as directed (includes completed medication regime)? No  [Patient reports that he does have his lactulose and taking that. ]   What is preventing the patient from taking all medications as directed? Other  [See above. ]   Nursing Interventions Nurse provided patient education  [Advised patient to review list and meds that are delivered today and meds he had at home to try to take list as prescribed until his f/u appt on Tues. ]   Does the patient have a primary care provider?  Yes   Does the patient have an appointment with their PCP within 7 days of discharge? Yes   Comments regarding PCP PCP Dr Franco. Hospital follow up appt in place for Tues 4/12/22  330pm   Has the patient kept scheduled appointments due by today? N/A   What is the Home health agency?  Patient reports that he can't have HH until he sees PCP next week.    Has home health visited the patient within 72 hours of discharge? N/A   Psychosocial issues? Yes   Psychosocial comments Patient verbalizes that he needs a  to help him sort out insurance and how to get help.    Notified Case Management  Psychosocial (Urgent)  [Patient reports urgent need for  to contact. He reports that he has gotten home from rehab facility and thinks he has no insurance, no check coming in and needs help. ]   Did the patient receive a copy of their discharge instructions? Yes   Nursing interventions --  [Patient to bring packet from rehab facility to f/u appt on tues]   What is the patient's perception of their health status since discharge? Improving   Is the patient/caregiver able to teach back signs and symptoms related to disease process for when to call PCP? Yes   Is the patient/caregiver able to teach back signs and symptoms related to disease process for when to call 911? Yes   Is the patient/caregiver able to teach back the hierarchy of who to call/visit for symptoms/problems? PCP, Specialist, Home health nurse, Urgent Care, ED, 911 Yes   If the patient is a current smoker, are they able to teach back resources for cessation? Not a smoker   TCM call completed? Yes          Keisha Clinton RN    4/8/2022, 13:00 EDT

## 2022-04-08 NOTE — OUTREACH NOTE
Care Coordination    MSW received message from Los Davies and also RANDA Rodríguez [facility], regarding patient's need for assistance with insurance. MSW called patient and left VM.    MSW called patient's sister and she states that patient called [employer] today and 'everything is fine now'. MSW available if any further needs arise.    JESSENIA APARICIO  Ambulatory Case Management    4/8/2022, 15:30 EDT

## 2022-04-11 ENCOUNTER — TELEPHONE (OUTPATIENT)
Dept: INTERNAL MEDICINE | Facility: CLINIC | Age: 57
End: 2022-04-11

## 2022-04-12 ENCOUNTER — LAB (OUTPATIENT)
Dept: LAB | Facility: HOSPITAL | Age: 57
End: 2022-04-12

## 2022-04-12 ENCOUNTER — OFFICE VISIT (OUTPATIENT)
Dept: INTERNAL MEDICINE | Facility: CLINIC | Age: 57
End: 2022-04-12

## 2022-04-12 VITALS
HEIGHT: 68 IN | WEIGHT: 260.6 LBS | OXYGEN SATURATION: 97 % | HEART RATE: 77 BPM | TEMPERATURE: 96.8 F | BODY MASS INDEX: 39.5 KG/M2 | SYSTOLIC BLOOD PRESSURE: 121 MMHG | DIASTOLIC BLOOD PRESSURE: 77 MMHG

## 2022-04-12 DIAGNOSIS — R18.8 CIRRHOSIS OF LIVER WITH ASCITES, UNSPECIFIED HEPATIC CIRRHOSIS TYPE: Primary | ICD-10-CM

## 2022-04-12 DIAGNOSIS — I63.9 CEREBROVASCULAR ACCIDENT (CVA), UNSPECIFIED MECHANISM: ICD-10-CM

## 2022-04-12 DIAGNOSIS — K74.60 CIRRHOSIS OF LIVER WITH ASCITES, UNSPECIFIED HEPATIC CIRRHOSIS TYPE: Primary | ICD-10-CM

## 2022-04-12 DIAGNOSIS — F41.9 ANXIETY: ICD-10-CM

## 2022-04-12 DIAGNOSIS — E11.9 DIABETES MELLITUS WITHOUT COMPLICATION: ICD-10-CM

## 2022-04-12 DIAGNOSIS — Z11.59 NEED FOR HEPATITIS C SCREENING TEST: ICD-10-CM

## 2022-04-12 DIAGNOSIS — E78.5 HYPERLIPIDEMIA, UNSPECIFIED HYPERLIPIDEMIA TYPE: ICD-10-CM

## 2022-04-12 DIAGNOSIS — N28.9 IMPAIRED RENAL FUNCTION: ICD-10-CM

## 2022-04-12 DIAGNOSIS — K27.4 GASTROINTESTINAL HEMORRHAGE ASSOCIATED WITH PEPTIC ULCER: ICD-10-CM

## 2022-04-12 DIAGNOSIS — D64.9 ANEMIA, UNSPECIFIED TYPE: ICD-10-CM

## 2022-04-12 LAB
ALBUMIN SERPL-MCNC: 3.4 G/DL (ref 3.5–5.2)
ALBUMIN/GLOB SERPL: 1.3 G/DL
ALP SERPL-CCNC: 128 U/L (ref 39–117)
ALT SERPL W P-5'-P-CCNC: 37 U/L (ref 1–41)
AMMONIA BLD-SCNC: 114 UMOL/L (ref 16–60)
ANION GAP SERPL CALCULATED.3IONS-SCNC: 6 MMOL/L (ref 5–15)
AST SERPL-CCNC: 46 U/L (ref 1–40)
BASOPHILS # BLD AUTO: 0.07 10*3/MM3 (ref 0–0.2)
BASOPHILS NFR BLD AUTO: 1.6 % (ref 0–1.5)
BILIRUB SERPL-MCNC: 1.5 MG/DL (ref 0–1.2)
BUN SERPL-MCNC: 18 MG/DL (ref 6–20)
BUN/CREAT SERPL: 17.8 (ref 7–25)
CALCIUM SPEC-SCNC: 7.8 MG/DL (ref 8.6–10.5)
CHLORIDE SERPL-SCNC: 109 MMOL/L (ref 98–107)
CHOLEST SERPL-MCNC: 110 MG/DL (ref 0–200)
CO2 SERPL-SCNC: 25 MMOL/L (ref 22–29)
CREAT SERPL-MCNC: 1.01 MG/DL (ref 0.76–1.27)
DEPRECATED RDW RBC AUTO: 45.4 FL (ref 37–54)
EGFRCR SERPLBLD CKD-EPI 2021: 87.3 ML/MIN/1.73
EOSINOPHIL # BLD AUTO: 0.25 10*3/MM3 (ref 0–0.4)
EOSINOPHIL NFR BLD AUTO: 5.7 % (ref 0.3–6.2)
ERYTHROCYTE [DISTWIDTH] IN BLOOD BY AUTOMATED COUNT: 13.1 % (ref 12.3–15.4)
GLOBULIN UR ELPH-MCNC: 2.7 GM/DL
GLUCOSE SERPL-MCNC: 128 MG/DL (ref 65–99)
HCT VFR BLD AUTO: 31.3 % (ref 37.5–51)
HCV AB SER DONR QL: NORMAL
HDLC SERPL-MCNC: 63 MG/DL (ref 40–60)
HGB BLD-MCNC: 10.8 G/DL (ref 13–17.7)
IMM GRANULOCYTES # BLD AUTO: 0.02 10*3/MM3 (ref 0–0.05)
IMM GRANULOCYTES NFR BLD AUTO: 0.5 % (ref 0–0.5)
LDLC SERPL CALC-MCNC: 32 MG/DL (ref 0–100)
LDLC/HDLC SERPL: 0.53 {RATIO}
LYMPHOCYTES # BLD AUTO: 0.87 10*3/MM3 (ref 0.7–3.1)
LYMPHOCYTES NFR BLD AUTO: 19.9 % (ref 19.6–45.3)
MCH RBC QN AUTO: 32.7 PG (ref 26.6–33)
MCHC RBC AUTO-ENTMCNC: 34.5 G/DL (ref 31.5–35.7)
MCV RBC AUTO: 94.8 FL (ref 79–97)
MONOCYTES # BLD AUTO: 0.5 10*3/MM3 (ref 0.1–0.9)
MONOCYTES NFR BLD AUTO: 11.4 % (ref 5–12)
NEUTROPHILS NFR BLD AUTO: 2.66 10*3/MM3 (ref 1.7–7)
NEUTROPHILS NFR BLD AUTO: 60.9 % (ref 42.7–76)
NRBC BLD AUTO-RTO: 0 /100 WBC (ref 0–0.2)
PLATELET # BLD AUTO: 82 10*3/MM3 (ref 140–450)
PMV BLD AUTO: 11.6 FL (ref 6–12)
POTASSIUM SERPL-SCNC: 5.2 MMOL/L (ref 3.5–5.2)
PROT SERPL-MCNC: 6.1 G/DL (ref 6–8.5)
RBC # BLD AUTO: 3.3 10*6/MM3 (ref 4.14–5.8)
SODIUM SERPL-SCNC: 140 MMOL/L (ref 136–145)
TRIGL SERPL-MCNC: 69 MG/DL (ref 0–150)
TSH SERPL DL<=0.05 MIU/L-ACNC: 2.65 UIU/ML (ref 0.27–4.2)
VLDLC SERPL-MCNC: 15 MG/DL (ref 5–40)
WBC NRBC COR # BLD: 4.37 10*3/MM3 (ref 3.4–10.8)

## 2022-04-12 PROCEDURE — 83036 HEMOGLOBIN GLYCOSYLATED A1C: CPT | Performed by: INTERNAL MEDICINE

## 2022-04-12 PROCEDURE — 1111F DSCHRG MED/CURRENT MED MERGE: CPT | Performed by: INTERNAL MEDICINE

## 2022-04-12 PROCEDURE — 82140 ASSAY OF AMMONIA: CPT | Performed by: INTERNAL MEDICINE

## 2022-04-12 PROCEDURE — 80053 COMPREHEN METABOLIC PANEL: CPT | Performed by: INTERNAL MEDICINE

## 2022-04-12 PROCEDURE — 99214 OFFICE O/P EST MOD 30 MIN: CPT | Performed by: INTERNAL MEDICINE

## 2022-04-12 PROCEDURE — 84443 ASSAY THYROID STIM HORMONE: CPT | Performed by: INTERNAL MEDICINE

## 2022-04-12 PROCEDURE — 86803 HEPATITIS C AB TEST: CPT | Performed by: INTERNAL MEDICINE

## 2022-04-12 PROCEDURE — 80061 LIPID PANEL: CPT | Performed by: INTERNAL MEDICINE

## 2022-04-12 PROCEDURE — 85025 COMPLETE CBC W/AUTO DIFF WBC: CPT | Performed by: INTERNAL MEDICINE

## 2022-04-12 RX ORDER — MORPHINE SULFATE 15 MG/1
TABLET ORAL
COMMUNITY
Start: 2022-03-21 | End: 2022-04-12

## 2022-04-12 RX ORDER — FLURBIPROFEN SODIUM 0.3 MG/ML
SOLUTION/ DROPS OPHTHALMIC
COMMUNITY
Start: 2022-04-08 | End: 2022-05-24

## 2022-04-12 RX ORDER — POTASSIUM CHLORIDE 20 MEQ/1
20 TABLET, EXTENDED RELEASE ORAL DAILY
Qty: 90 TABLET | Refills: 0 | Status: ON HOLD | OUTPATIENT
Start: 2022-04-12 | End: 2022-06-17 | Stop reason: SDUPTHER

## 2022-04-12 RX ORDER — HYDROCODONE BITARTRATE AND ACETAMINOPHEN 5; 325 MG/1; MG/1
TABLET ORAL
COMMUNITY
Start: 2022-03-12 | End: 2022-04-12

## 2022-04-12 RX ORDER — LANCETS 28 GAUGE
EACH MISCELLANEOUS
COMMUNITY
Start: 2022-02-23 | End: 2022-09-08

## 2022-04-12 RX ORDER — POTASSIUM CHLORIDE 20 MEQ/1
20 TABLET, EXTENDED RELEASE ORAL DAILY
COMMUNITY
Start: 2022-04-08 | End: 2022-04-12 | Stop reason: SDUPTHER

## 2022-04-12 RX ORDER — FUROSEMIDE 40 MG/1
40 TABLET ORAL 2 TIMES DAILY
Qty: 180 TABLET | Refills: 3 | Status: ON HOLD | OUTPATIENT
Start: 2022-04-12 | End: 2022-06-17 | Stop reason: SDUPTHER

## 2022-04-12 RX ORDER — PROPRANOLOL HYDROCHLORIDE 20 MG/1
60 TABLET ORAL 2 TIMES DAILY
COMMUNITY
Start: 2022-03-11 | End: 2022-04-12

## 2022-04-12 RX ORDER — SERTRALINE HYDROCHLORIDE 25 MG/1
TABLET, FILM COATED ORAL
COMMUNITY
Start: 2022-04-08 | End: 2022-04-12

## 2022-04-12 RX ORDER — LIDOCAINE 5 %
1 ADHESIVE PATCH, MEDICATED TOPICAL SEE ADMIN INSTRUCTIONS
COMMUNITY
Start: 2022-03-11 | End: 2022-04-12

## 2022-04-12 RX ORDER — BUSPIRONE HYDROCHLORIDE 7.5 MG/1
7.5 TABLET ORAL 3 TIMES DAILY
COMMUNITY
Start: 2022-04-08 | End: 2022-04-12

## 2022-04-12 RX ORDER — SPIRONOLACTONE 100 MG/1
100 TABLET, FILM COATED ORAL 2 TIMES DAILY
Qty: 180 TABLET | Refills: 1 | Status: ON HOLD | OUTPATIENT
Start: 2022-04-12 | End: 2022-06-17 | Stop reason: SDUPTHER

## 2022-04-12 RX ORDER — FLUTICASONE PROPIONATE 50 MCG
SPRAY, SUSPENSION (ML) NASAL
COMMUNITY
Start: 2022-04-08 | End: 2022-05-02

## 2022-04-12 RX ORDER — OMEPRAZOLE 40 MG/1
40 CAPSULE, DELAYED RELEASE ORAL
COMMUNITY
Start: 2022-04-08 | End: 2022-04-12 | Stop reason: SDUPTHER

## 2022-04-12 RX ORDER — PEN NEEDLE, DIABETIC 32GX 5/32"
NEEDLE, DISPOSABLE MISCELLANEOUS
COMMUNITY
Start: 2022-03-11 | End: 2022-05-24

## 2022-04-12 RX ORDER — LACTULOSE 10 G/15ML
SOLUTION ORAL; RECTAL
COMMUNITY
Start: 2022-04-08 | End: 2022-05-12 | Stop reason: ALTCHOICE

## 2022-04-12 RX ORDER — INSULIN LISPRO 100 [IU]/ML
INJECTION, SOLUTION INTRAVENOUS; SUBCUTANEOUS
COMMUNITY
Start: 2022-04-08 | End: 2022-04-12

## 2022-04-12 RX ORDER — AMLODIPINE BESYLATE 10 MG/1
10 TABLET ORAL DAILY
COMMUNITY
Start: 2022-03-11 | End: 2022-04-12

## 2022-04-12 RX ORDER — GLIMEPIRIDE 1 MG/1
TABLET ORAL
COMMUNITY
Start: 2022-03-11 | End: 2022-04-12 | Stop reason: ALTCHOICE

## 2022-04-12 RX ORDER — PREGABALIN 75 MG/1
CAPSULE ORAL
COMMUNITY
Start: 2022-03-21 | End: 2022-04-12

## 2022-04-12 RX ORDER — OMEPRAZOLE 40 MG/1
40 CAPSULE, DELAYED RELEASE ORAL 2 TIMES DAILY
Qty: 180 CAPSULE | Refills: 3 | Status: ON HOLD | OUTPATIENT
Start: 2022-04-12 | End: 2022-06-17 | Stop reason: SDUPTHER

## 2022-04-12 RX ORDER — CHOLECALCIFEROL (VITAMIN D3) 125 MCG
CAPSULE ORAL
COMMUNITY
Start: 2022-03-16 | End: 2022-04-14

## 2022-04-12 RX ORDER — ONDANSETRON 4 MG/1
TABLET, FILM COATED ORAL
COMMUNITY
Start: 2022-04-08 | End: 2022-04-12

## 2022-04-12 RX ORDER — ATORVASTATIN CALCIUM 40 MG/1
40 TABLET, FILM COATED ORAL DAILY
Qty: 90 TABLET | Refills: 3 | Status: ON HOLD | OUTPATIENT
Start: 2022-04-12 | End: 2022-06-17 | Stop reason: SDUPTHER

## 2022-04-12 RX ORDER — INSULIN DETEMIR 100 [IU]/ML
INJECTION, SOLUTION SUBCUTANEOUS
COMMUNITY
Start: 2022-04-08 | End: 2022-04-12

## 2022-04-12 NOTE — PROGRESS NOTES
"Transitional Care Follow Up Visit  Subjective     Nci Nicolas is a 57 y.o. male who presents for a transitional care management visit.    Within 48 business hours after discharge our office contacted him via telephone to coordinate his care and needs.      I reviewed and discussed the details of that call along with the discharge summary, hospital problems, inpatient lab results, inpatient diagnostic studies, and consultation reports with Nic.     Current outpatient and discharge medications have been reconciled for the patient.  Reviewed by: Jonh Franco Jr., MD      Date of TCM Phone Call 4/6/2022   [facility] [place]   Date of Admission 3/11/2022   Date of Discharge 4/7/2022   Discharge Disposition -     Risk for Readmission (LACE) No data recorded    History of Present Illness     Course During Hospital Stay: admitted for hepatic encephalopathy again. Patient states he is taking \"everything like he is supposed to,\" but cannot state what medication he is taking or what the meds are for. Patient reports pharmacy is filling pill packs for him.   Cirrhosis- patient reports taking lactulose regularly. Patient reports also on his diuretics and rifaximin.   Anemia- noted on labs prior to D/C from hospital.   History of cerebrovascular accident- on statin. Patient had baseline cognition.   hypertension- patient denies Has, dizziness, chest pain.   Diabetes Mellitus 2- patient reports out of control recently. Patient reports taking levemir.   History of GI bleed- patient taking prilosec.     Patient report shaving home health nurse that helps him with medications.      The following portions of the patient's history were reviewed and updated as appropriate: allergies, current medications, past family history, past medical history, past social history, past surgical history, and problem list.      Objective   Vitals:    04/12/22 1548   BP: 121/77   Pulse: 77   Temp: 96.8 °F (36 °C)   SpO2: " 97%       Appearance: No acute distress, well-nourished  Head: normocephalic, atraumatic  Eyes: extraocular movements intact, no scleral icterus, no conjunctival injection  Ears, Nose, and Throat: external ears normal, nares patent, moist mucous membranes  Cardiovascular: regular rate and rhythm. no murmurs, rales, or rhonchi. no edema  Respiratory: breathing comfortably, symmetric chest rise, clear to auscultation bilaterally. No wheezes, rales, or rhonchi.  Neuro: alert and oriented to time, place, and person. Normal gait  Psych: normal mood and affect       Assessment/Plan     Diagnoses and all orders for this visit:    1. Cirrhosis of liver with ascites, unspecified hepatic cirrhosis type (HCC) (Primary)  Comments:  prior history. cont diuretics, rifaximin, and lactulose. check ammonia level. close f/u with GI.   Orders:  -     Ammonia  -     TSH  -     spironolactone (ALDACTONE) 100 MG tablet; Take 1 tablet by mouth 2 (Two) Times a Day.  Dispense: 180 tablet; Refill: 1  -     riFAXIMin (XIFAXAN) 550 MG tablet; Take 1 tablet by mouth Every 12 (Twelve) Hours. Indications: Impaired Brain Function due to Liver Disease  Dispense: 180 tablet; Refill: 3  -     furosemide (LASIX) 40 MG tablet; Take 1 tablet by mouth 2 (Two) Times a Day.  Dispense: 180 tablet; Refill: 3  -     potassium chloride (K-DUR,KLOR-CON) 20 MEQ CR tablet; Take 1 tablet by mouth Daily.  Dispense: 90 tablet; Refill: 0  -     Ambulatory Referral to Gastroenterology    2. Hyperlipidemia, unspecified hyperlipidemia type  Comments:  cont statin.   Orders:  -     Lipid Panel    3. Diabetes mellitus without complication (HCC)  Comments:  cont insulin as prescribed. monitor BG.   Orders:  -     insulin detemir (LEVEMIR) 100 UNIT/ML injection; Inject 15 Units under the skin into the appropriate area as directed Every Night.  Dispense: 15 mL; Refill: 3  -     insulin lispro (humaLOG) 100 UNIT/ML injection; Inject 0-7 Units under the skin into the  appropriate area as directed 3 (Three) Times a Day Before Meals.  Dispense: 15 mL; Refill: 3  -     Hemoglobin A1c    4. Gastrointestinal hemorrhage associated with peptic ulcer  Comments:  ensure taking PPI for prophylaxis  Orders:  -     omeprazole (priLOSEC) 40 MG capsule; Take 1 capsule by mouth 2 (Two) Times a Day.  Dispense: 180 capsule; Refill: 3    5. Cerebrovascular accident (CVA), unspecified mechanism (HCC)  Comments:  cont statin. not on antiplatelet with h/o GI bleeds.   Orders:  -     atorvastatin (LIPITOR) 40 MG tablet; Take 1 tablet by mouth Daily.  Dispense: 90 tablet; Refill: 3    6. Anemia, unspecified type  Comments:  monitor closely with h/o GI bleed.   Orders:  -     CBC & Differential    7. Impaired renal function  Comments:  recheck renal function  Orders:  -     Comprehensive Metabolic Panel    8. Anxiety  Comments:  cont zoloft as previously worked well for pt.   Orders:  -     sertraline (ZOLOFT) 50 MG tablet; Take 1 tablet by mouth Daily.  Dispense: 90 tablet; Refill: 3    9. Need for hepatitis C screening test  -     Hepatitis C Antibody        Medications Discontinued During This Encounter   Medication Reason   • busPIRone (BUSPAR) 5 MG tablet *Therapy completed   • amLODIPine (NORVASC) 10 MG tablet *Therapy completed   • esomeprazole (nexIUM) 20 MG capsule *Therapy completed   • HYDROcodone-acetaminophen (NORCO) 5-325 MG per tablet *Therapy completed   • HumaLOG KwikPen 100 UNIT/ML solution pen-injector *Therapy completed   • Levemir FlexTouch 100 UNIT/ML injection *Therapy completed   • magnesium oxide (MAGOX) 400 (241.3 Mg) MG tablet tablet *Therapy completed   • Morphine (MSIR) 15 MG tablet *Therapy completed   • ondansetron (ZOFRAN) 4 MG tablet *Therapy completed   • potassium chloride (MICRO-K) 10 MEQ CR capsule *Therapy completed   • Vitamin B-2 (RIBOFLAVIN) 100 MG tablet tablet *Therapy completed   • lactulose (CHRONULAC) 10 GM/15ML solution *Therapy completed   • Lidoderm 5 %  *Therapy completed   • propranolol (INDERAL) 20 MG tablet *Therapy completed   • omeprazole (priLOSEC) 20 MG capsule *Error   • sertraline (ZOLOFT) 25 MG tablet *Error   • glimepiride (AMARYL) 1 MG tablet Alternate therapy   • pregabalin (LYRICA) 75 MG capsule *Therapy completed   • busPIRone (BUSPAR) 7.5 MG tablet *Therapy completed   • atorvastatin (LIPITOR) 40 MG tablet Reorder   • riFAXIMin (XIFAXAN) 550 MG tablet Reorder   • sertraline (ZOLOFT) 50 MG tablet Reorder   • spironolactone (ALDACTONE) 100 MG tablet Reorder   • insulin detemir (LEVEMIR) 100 UNIT/ML injection Reorder   • furosemide (LASIX) 40 MG tablet Reorder   • insulin lispro (humaLOG) 100 UNIT/ML injection Reorder   • omeprazole (priLOSEC) 40 MG capsule Reorder   • potassium chloride (K-DUR,KLOR-CON) 20 MEQ CR tablet Reorder       No follow-ups on file.

## 2022-04-13 ENCOUNTER — PATIENT OUTREACH (OUTPATIENT)
Dept: CASE MANAGEMENT | Facility: OTHER | Age: 57
End: 2022-04-13

## 2022-04-13 ENCOUNTER — TELEPHONE (OUTPATIENT)
Dept: INTERNAL MEDICINE | Facility: CLINIC | Age: 57
End: 2022-04-13

## 2022-04-13 DIAGNOSIS — K76.82 HEPATIC ENCEPHALOPATHY: ICD-10-CM

## 2022-04-13 DIAGNOSIS — K74.60 CIRRHOSIS OF LIVER WITH ASCITES, UNSPECIFIED HEPATIC CIRRHOSIS TYPE: Primary | ICD-10-CM

## 2022-04-13 DIAGNOSIS — I10 HYPERTENSION, UNSPECIFIED TYPE: ICD-10-CM

## 2022-04-13 DIAGNOSIS — R18.8 CIRRHOSIS OF LIVER WITH ASCITES, UNSPECIFIED HEPATIC CIRRHOSIS TYPE: Primary | ICD-10-CM

## 2022-04-13 LAB — HBA1C MFR BLD: 6.4 % (ref 4.8–5.6)

## 2022-04-13 PROCEDURE — 99490 CHRNC CARE MGMT STAFF 1ST 20: CPT | Performed by: INTERNAL MEDICINE

## 2022-04-13 NOTE — OUTREACH NOTE
AMBULATORY CASE MANAGEMENT NOTE    Name and Relationship of Patient/Support Person:  -     Adult Patient Profile  Questions/Answers    Flowsheet Row Most Recent Value   Symptoms/Conditions Managed at Home diabetes, type 2   Barriers to Managing Health lack of transportation   Diabetes Management Strategies blood glucose testing, diet modification, medication therapy   Frequency of Blood Glucose Testing 6 times/day   A1C Target below 7.0   Diabetes Self-Management Outcome 2 (bad)      CCM Interim Update      Contact was made with the patient and stated that he was at home stated he was in need of a refill of (NORCO) 5-325 MG . I explained that I would send request to PCP . The patient agreed . I asked if the patient would agree to long term care to help care for him while his sister , who has provided care for him in the past, under went a surgical procedure. He was confident he could care for himself but stated that he might consider it . I share that I had placed a request for Medical SW to follow up on his case and he agreed. Patient reminded me several time as to his need for pain control. Note was sent to PCP. Per chart review the patient was seen in the office of the PCP for TCM visit the day prior.    I sent request to SW as to the patient needs.     Education Documentation  No documentation found.        KAYLA NARANJO  Ambulatory Case Management    4/13/2022, 12:48 EDT

## 2022-04-13 NOTE — TELEPHONE ENCOUNTER
Jonh Franco Jr., MD   4/13/2022 12:19 PM EDT Back to Top        Ammonia level elevated still. Ensure taking lactulose and getting 4-5 Bms per day. Need to keep a close eye on this.

## 2022-04-13 NOTE — TELEPHONE ENCOUNTER
PARISH WRIGHT (Key: [id number]) - [id number]  Xifaxan 550MG tablets       Status: PA Request    Created: April 12th, 2022 [phone]    Sent: April 13th, 2022

## 2022-04-13 NOTE — TELEPHONE ENCOUNTER
PT(PATIENT) VERIFIED     CONTACTED PT(PATIENT) PER PROVIDER'S INSTRUCTIONS    PT(PATIENT) STATES HE IS TAKING THE MEDICATION AND HE HAS 2 TO 3 BM'S PER DAY    PT(PATIENT) STATES HE IS SUPPOSED TO HAVE A REFILL FOR 5MG OXYCODONE    PROVIDER PLEASE ADVISE

## 2022-04-14 ENCOUNTER — PATIENT OUTREACH (OUTPATIENT)
Dept: CASE MANAGEMENT | Facility: OTHER | Age: 57
End: 2022-04-14

## 2022-04-14 NOTE — OUTREACH NOTE
Care Coordination    MSW spoke with PCP and Los Davies regarding patient needing LTC placement as he is unable to care for himself.    Patient Outreach    MSW spoke with patient regarding LTC placement. Patient states that his sister has had a double hip replacement and is unable to care for him. Patient is agreeable to LTC placement, but prefers [facility].    MSW sent referral to [facility] on this day.    JESSENIA APARICIO  Ambulatory Case Management    4/14/2022, 10:57 EDT

## 2022-04-14 NOTE — TELEPHONE ENCOUNTER
Malcolm HENDERSON Juan Manuelpilar   2/13/2017 8:00 AM   Anticoagulation Therapy Visit    Description:  76 year old male   Provider:  [facility]   Department:  [facility]           INR as of 2/13/2017     Today's INR 2.3      Anticoagulation Summary as of 2/13/2017     INR goal 2.0-3.0   Today's INR 2.3   Full instructions 2.5 mg on Wed, Sat; 5 mg all other days   Next INR check 3/13/2017    Indications   Long-term (current) use of anticoagulants [Z79.01] [Z79.01]  Chronic atrial fibrillation (H) [I48.2]         Your next Anticoagulation Clinic appointment(s)     Mar 13, 2017  8:00 AM CDT   Anticoagulation Visit with  ANTICOAGULATION CLINIC   [facility]    [address]  [address]  [place] [address] [phone]              Contact Numbers     [facility]  Please call  [phone] to cancel and/or reschedule your appointment   Please call  [phone] with any problems or questions regarding your therapy.        February 2017 Details    Sun Mon Tue Wed Thu Fri Sat        1               2               3               4                 5               6               7               8               9               10               11                 12               13      5 mg   See details      14      5 mg         15      2.5 mg         16      5 mg         17      5 mg         18      2.5 mg           19      5 mg         20      5 mg         21      5 mg         22      2.5 mg         23      5 mg         24      5 mg         25      2.5 mg           26      5 mg         27      5 mg         28      5 mg              Date Details   02/13 This INR check               How to take your warfarin dose     To take:  2.5 mg Take 0.5 of a 5 mg tablet.    To take:  5 mg Take 1 of the 5 mg tablets.           March 2017 Details    Sun Mon Tue Wed Thu Fri Sat        1      2.5 mg         2      5 mg         3      5 mg         4      2.5 mg           5  He ask Los as well. I will not write controlled substances for him until we can ensure he is taken his medications appropriately which will be evidenced by being able to stay out of the hospital.         5 mg         6      5 mg         7      5 mg         8      2.5 mg         9      5 mg         10      5 mg         11      2.5 mg           12      5 mg         13            14               15               16               17               18                 19               20               21               22               23               24               25                 26               27               28               29               30               31                 Date Details   No additional details    Date of next INR:  3/13/2017         How to take your warfarin dose     To take:  2.5 mg Take 0.5 of a 5 mg tablet.    To take:  5 mg Take 1 of the 5 mg tablets.

## 2022-04-15 ENCOUNTER — PATIENT OUTREACH (OUTPATIENT)
Dept: CASE MANAGEMENT | Facility: OTHER | Age: 57
End: 2022-04-15

## 2022-04-15 NOTE — OUTREACH NOTE
Care Coordination    MSW spoke with Sue at [facility] and they are unable to take patient back he did not meet LOC.    Patient Outreach    MSW spoke with patient regarding denial from [facility] and patient agreeable to other referrals sent for LTC. Patient also asked multiple times about pain pills. MSW explained that she is unable to help with this. Provider has already handled this situation. Patient asked about any doctors in the area that would prescriber him pain medication.     Care Coordination    MSW sent referral to [facility] and will follow up Monday.    JESSENIA APARICIO  Ambulatory Case Management    4/15/2022, 11:01 EDT

## 2022-04-18 ENCOUNTER — PATIENT OUTREACH (OUTPATIENT)
Dept: CASE MANAGEMENT | Facility: OTHER | Age: 57
End: 2022-04-18

## 2022-04-18 NOTE — OUTREACH NOTE
Care Coordination    MSW spoke with Theodore at [facility]. They do not have any Medicaid beds available.     JESSENIA sent referral to [facility] for LTC referral.    JESSENIA APARICIO  Ambulatory Case Management    4/18/2022, 10:00 EDT

## 2022-04-19 ENCOUNTER — PATIENT OUTREACH (OUTPATIENT)
Dept: CASE MANAGEMENT | Facility: OTHER | Age: 57
End: 2022-04-19

## 2022-04-19 NOTE — OUTREACH NOTE
Care Coordination    No local SNF facilities will accept patient for admission due to insurance denial. Patient will have to direct admit to SNF from hospital. Patient unable to care for himself at home and sister unable to help currently due to double hip replacement. APS report made # [id number].    JESSENIA APARICIO  Ambulatory Case Management    4/19/2022, 15:48 EDT

## 2022-04-21 ENCOUNTER — TELEPHONE (OUTPATIENT)
Dept: INTERNAL MEDICINE | Facility: CLINIC | Age: 57
End: 2022-04-21

## 2022-04-21 ENCOUNTER — TELEPHONE (OUTPATIENT)
Dept: NEUROLOGY | Facility: CLINIC | Age: 57
End: 2022-04-21

## 2022-04-21 NOTE — TELEPHONE ENCOUNTER
PLEASE REFER TO THE FOLLOWING ENCOUNTER  Telephone with Jonh Franco Jr., MD (04/13/2022)    ATTEMPTED TO CONTACT PT PER PROVIDER'S INSTRUCTIONS     NO ANSWER     UNABLE TO LEAVE A VOICEMAIL WITH INSTRUCTIONS TO RETURN CALL TO OFFICE AT [phone]    OK FOR HUB TO ADVISE/READ   PER DR FRANCO'S PREVIOUS NOTE    Jonh Franco Jr., MD  I will not write controlled substances for him until we can ensure he is taken his medications appropriately which will be evidenced by being able to stay out of the hospital.         PLEASE ADVISE PT(PATIENT) OF THE ABOVE FROM DR FRANCO    PLEASE ADVISE PT(PATIENT) DR FRANCO SENT HIM IN Diclofenac Sodium (VOLTAREN) 1 % gel gel (04/08/2022)    THANK YOU

## 2022-04-21 NOTE — TELEPHONE ENCOUNTER
Caller: Nic Nicolas    Relationship: Self    Best call back number: [phone]    What medication are you requesting: PAIN MEDICATION    What are your current symptoms: SEVERE PAIN    Have you had these symptoms before:    [x] Yes  [] No    Have you been treated for these symptoms before:   [x] Yes  [] No    If a prescription is needed, what is your preferred pharmacy and phone number: [facility] [address] - [phone]  - [phone]      Additional notes:  THE PATIENT STATED HE IS SEVERE PAIN AND IS TERMINALLY ILL AND NEEDS PCP TO SEND MEDICATION TO THE PHARMACY. THE PATIENT BECAME VERY UPSET ON THE PHONE BUT DECLINED A WARM TRANSFER. HE WOULD LIKE A CALL BACK TO SPEAK WITH THE NURSE

## 2022-04-27 ENCOUNTER — PATIENT OUTREACH (OUTPATIENT)
Dept: CASE MANAGEMENT | Facility: OTHER | Age: 57
End: 2022-04-27

## 2022-04-27 DIAGNOSIS — K76.82 HEPATIC ENCEPHALOPATHY: ICD-10-CM

## 2022-04-27 DIAGNOSIS — I10 HYPERTENSION, UNSPECIFIED TYPE: ICD-10-CM

## 2022-04-27 DIAGNOSIS — K74.60 CIRRHOSIS OF LIVER WITH ASCITES, UNSPECIFIED HEPATIC CIRRHOSIS TYPE: Primary | ICD-10-CM

## 2022-04-27 DIAGNOSIS — R18.8 CIRRHOSIS OF LIVER WITH ASCITES, UNSPECIFIED HEPATIC CIRRHOSIS TYPE: Primary | ICD-10-CM

## 2022-04-27 NOTE — OUTREACH NOTE
[facility] End of Month Documentation    This Chronic Medical Management Care Plan for Nic Nicolas, 57 y.o. male, has been monitored and managed; reviewed and a new plan of care implemented for the month of April.  A cumulative time of 24  minutes was spent on this patient record this month, including electronic communication with primary care provider; chart review; phone call with patient, Chart review , call to [facility], call to [facility] (patient floor).    Regarding the patient's problems: has Arthritis, senescent; Body mass index (BMI) 40.0-44.9, adult (HCC); Colon polyps; Cirrhosis (HCC); Multiple episodes of deep venous thrombosis (HCC); High blood pressure; Hyperlipidemia; Pancytopenia (HCC); Sleep apnea; Systolic congestive heart failure (HCC); Bilateral lower extremity edema; Chronic cystitis; Chronic low back pain; Diabetes mellitus without complication (HCC); Acid reflux; Acetabular fracture (HCC); Gross hematuria; Hesitancy of micturition; Gastrointestinal hemorrhage associated with peptic ulcer; Anxiety; Hepatic encephalopathy (HCC); Stroke (HCC); and Thrombocytopenia (HCC) on their problem list., the following items were addressed: medical records; medications; referrals to community service providers, Patient admitted , follow up and any changes can be found within the plan section of the note.  A detailed listing of time spent for chronic care management is tracked within each outreach encounter.  Current medications include:  has a current medication list which includes the following prescription(s): albuterol sulfate hfa, atorvastatin, b-d uf iii mini pen needles, bd pen needle mabel u/f, diclofenac sodium, fluticasone, fluticasone, furosemide, generlac, glucose blood, insulin detemir, insulin lispro, freestyle, omeprazole, ondansetron odt, potassium chloride, rifaximin, sertraline, and spironolactone. and the patient is reported to be patient is noncompliant with medication protocol, Complient with  meds for DM2 only,  Medications are reported to be non-effective in controlling symptoms and changes have been made to the medication protocol, for A1c reduction.All notes on chart for PCP to review.    The patient was monitored remotely for blood pressure; blood glucose; mood & behavior; pain; medications, poly diagnosis.    The patient's physical needs include:  help taking medications as prescribed; needs assistance with ADLs; resources for disability needs; physical healthcare; medication education; physician referral.     The patient's mental support needs include:  increased support; coordination of community providers; mental health education    The patient's cognitive support needs include:  increased support; medication; health care; emotional care; supervision    The patient's psychosocial support needs include:  need for increased support    The patient's functional needs include: physician referral; medication education; resources for disability needs    The patient's environmental needs include:  no involvement in outside activities or no access to other activities; no access to transportation, N/A    Care Plan overall comments:  On going eval New care plan in place    Refer to previous outreach notes for more information on the areas listed above.    Monthly Billing Diagnoses  (K74.60,  R18.8) Cirrhosis of liver with ascites, unspecified hepatic cirrhosis type (HCC)    (I10) Hypertension, unspecified type    (K72.90) Hepatic encephalopathy (HCC)    Medications   · Medications have been reconciled    Care Plan progress this month:      Recently Modified Care Plans Updates made since 3/27/2022 12:00 AM     Diabetes Type 2 (Adult)         Problem Priority Last Modified     ELS GLYCEMIC MANAGEMENT (DIABETES, TYPE 2) (ADULT) --  4/13/2022 12:41 PM by Fili Davies MA              Goal Recent Progress Last Modified     Glycemic Management Optimized --  4/13/2022 12:41 PM by Fili Davies MA      Evidence-based guidance:   Anticipate A1C testing (point-of-care) every 3 to 6 months based on goal attainment.   Review mutually-set A1C goal or target range.   Anticipate use of antihyperglycemic with or without insulin and periodic adjustments; consider active involvement of pharmacist.   Provide medical nutrition therapy and development of individualized eating.   Compare self-reported symptoms of hypo or hyperglycemia to blood glucose levels, diet and fluid intake, current medications, psychosocial and physiologic stressors, change in activity and barriers to care adherence.   Promote self-monitoring of blood glucose levels.   Assess and address barriers to management plan, such as food insecurity, age, developmental ability, depression, anxiety, fear of hypoglycemia or weight gain, as well as medication cost, side effects and complicated regimen.   Consider referral to community-based diabetes education program, visiting nurse, community health worker or health .   Encourage regular dental care for treatment of periodontal disease; refer to dental provider when needed.    Notes:              Task Due Date Last Modified     Alleviate Barriers to Glycemic Management --  4/13/2022 12:41 PM by Fili Davies MA     Care Management Activities:      - A1C testing facilitated  - barriers to adherence to treatment plan identified  - blood glucose monitoring encouraged      Notes:                Problem Priority Last Modified     ELS DISEASE PROGRESSION (DIABETES, TYPE 2) (ADULT) --  4/13/2022 12:41 PM by Fili Davies MA              Goal Recent Progress Last Modified     Disease Progression Prevented or Minimized --  4/13/2022 12:41 PM by Fili Davies MA     Evidence-based guidance:   Prepare patient for laboratory and diagnostic exams based on risk and presentation.   Encourage lifestyle changes, such as increased intake of plant-based foods, stress reduction, consistent physical activity and smoking  cessation to prevent long-term complications and chronic disease.    Individualize activity and exercise recommendations while considering potential limitations, such as neuropathy, retinopathy or the ability to prevent hyperglycemia or hypoglycemia.    Prepare patient for use of pharmacologic therapy that may include antihypertensive, analgesic, prostaglandin E1 with periodic adjustments, based on presenting chronic condition and laboratory results.   Assess signs/symptoms and risk factors for hypertension, sleep-disordered breathing, neuropathy (including changes in gait and balance), retinopathy, nephropathy and sexual dysfunction.   Address pregnancy planning and contraceptive choice, especially when prescribing antihypertensive or statin.   Ensure completion of annual comprehensive foot exam and dilated eye exam.    Implement additional individualized goals and interventions based on identified risk factors.   Prepare patient for consultation or referral for specialist care, such as ophthalmology, neurology, cardiology, podiatry, nephrology or perinatology.    Notes:              Task Due Date Last Modified     Monitor and Manage Follow-up for Comorbidities --  4/13/2022 12:42 PM by Fili Davies MA     Care Management Activities:      - healthy lifestyle promoted      Notes:                        · Current Specialty Plan of Care Status in process    Instructions   · Patient was provided an electronic copy of care plan  · CCM services were explained and offered and patient has accepted these services.  · Patient has given their written consent to receive CCM services and understands that this includes the authorization of electronic communication of medical information with the other treating providers.  · Patient understands that they may stop CCM services at any time and these changes will be effective at the end of the calendar month and will effectively revocate the agreement of CCM services.  · Patient  understands that only one practitioner can furnish and be paid for CCM services during one calendar month.  Patient also understands that there may be co-payment or deductible fees in association with CCM services.  · Patient will continue with at least monthly follow-up calls with the Nurse Navigator.    KAYLA NARANJO  Ambulatory Case Management    4/27/2022, 11:29 EDT

## 2022-04-30 ENCOUNTER — HOSPITAL ENCOUNTER (EMERGENCY)
Facility: HOSPITAL | Age: 57
Discharge: LEFT BEFORE DISCHARGE | End: 2022-04-30
Attending: EMERGENCY MEDICINE | Admitting: EMERGENCY MEDICINE

## 2022-04-30 ENCOUNTER — APPOINTMENT (OUTPATIENT)
Dept: GENERAL RADIOLOGY | Facility: HOSPITAL | Age: 57
End: 2022-04-30

## 2022-04-30 VITALS
HEART RATE: 101 BPM | DIASTOLIC BLOOD PRESSURE: 71 MMHG | WEIGHT: 225 LBS | RESPIRATION RATE: 18 BRPM | OXYGEN SATURATION: 97 % | BODY MASS INDEX: 34.1 KG/M2 | HEIGHT: 68 IN | TEMPERATURE: 98.3 F | SYSTOLIC BLOOD PRESSURE: 112 MMHG

## 2022-04-30 DIAGNOSIS — E72.20 HYPERAMMONEMIA: Primary | ICD-10-CM

## 2022-04-30 LAB
ALBUMIN SERPL-MCNC: 3.8 G/DL (ref 3.5–5.2)
ALBUMIN/GLOB SERPL: 1.1 G/DL
ALP SERPL-CCNC: 139 U/L (ref 39–117)
ALT SERPL W P-5'-P-CCNC: 27 U/L (ref 1–41)
AMMONIA BLD-SCNC: 133 UMOL/L (ref 16–60)
AMPHET+METHAMPHET UR QL: POSITIVE
ANION GAP SERPL CALCULATED.3IONS-SCNC: 15.7 MMOL/L (ref 5–15)
AST SERPL-CCNC: 52 U/L (ref 1–40)
BACTERIA UR QL AUTO: ABNORMAL /HPF
BARBITURATES UR QL SCN: NEGATIVE
BASOPHILS # BLD AUTO: 0.06 10*3/MM3 (ref 0–0.2)
BASOPHILS NFR BLD AUTO: 0.8 % (ref 0–1.5)
BENZODIAZ UR QL SCN: NEGATIVE
BILIRUB SERPL-MCNC: 4.2 MG/DL (ref 0–1.2)
BILIRUB UR QL STRIP: ABNORMAL
BUN SERPL-MCNC: 34 MG/DL (ref 6–20)
BUN/CREAT SERPL: 20.2 (ref 7–25)
CALCIUM SPEC-SCNC: 9.7 MG/DL (ref 8.6–10.5)
CANNABINOIDS SERPL QL: NEGATIVE
CHLORIDE SERPL-SCNC: 94 MMOL/L (ref 98–107)
CLARITY UR: CLEAR
CO2 SERPL-SCNC: 20.3 MMOL/L (ref 22–29)
COCAINE UR QL: NEGATIVE
COLOR UR: ABNORMAL
CREAT SERPL-MCNC: 1.68 MG/DL (ref 0.76–1.27)
DEPRECATED RDW RBC AUTO: 47.5 FL (ref 37–54)
EGFRCR SERPLBLD CKD-EPI 2021: 47.4 ML/MIN/1.73
EOSINOPHIL # BLD AUTO: 0.23 10*3/MM3 (ref 0–0.4)
EOSINOPHIL NFR BLD AUTO: 3 % (ref 0.3–6.2)
ERYTHROCYTE [DISTWIDTH] IN BLOOD BY AUTOMATED COUNT: 14.1 % (ref 12.3–15.4)
ETHANOL BLD-MCNC: <10 MG/DL (ref 0–10)
ETHANOL UR QL: <0.01 %
GLOBULIN UR ELPH-MCNC: 3.4 GM/DL
GLUCOSE BLDC GLUCOMTR-MCNC: 162 MG/DL (ref 70–99)
GLUCOSE SERPL-MCNC: 157 MG/DL (ref 65–99)
GLUCOSE UR STRIP-MCNC: NEGATIVE MG/DL
HCT VFR BLD AUTO: 32.5 % (ref 37.5–51)
HGB BLD-MCNC: 11.6 G/DL (ref 13–17.7)
HGB UR QL STRIP.AUTO: NEGATIVE
HOLD SPECIMEN: NORMAL
HOLD SPECIMEN: NORMAL
HYALINE CASTS UR QL AUTO: ABNORMAL /LPF
IMM GRANULOCYTES # BLD AUTO: 0.02 10*3/MM3 (ref 0–0.05)
IMM GRANULOCYTES NFR BLD AUTO: 0.3 % (ref 0–0.5)
KETONES UR QL STRIP: ABNORMAL
LEUKOCYTE ESTERASE UR QL STRIP.AUTO: ABNORMAL
LYMPHOCYTES # BLD AUTO: 1.12 10*3/MM3 (ref 0.7–3.1)
LYMPHOCYTES NFR BLD AUTO: 14.7 % (ref 19.6–45.3)
MAGNESIUM SERPL-MCNC: 1.7 MG/DL (ref 1.6–2.6)
MCH RBC QN AUTO: 33 PG (ref 26.6–33)
MCHC RBC AUTO-ENTMCNC: 35.7 G/DL (ref 31.5–35.7)
MCV RBC AUTO: 92.3 FL (ref 79–97)
METHADONE UR QL SCN: NEGATIVE
MONOCYTES # BLD AUTO: 1.08 10*3/MM3 (ref 0.1–0.9)
MONOCYTES NFR BLD AUTO: 14.2 % (ref 5–12)
NEUTROPHILS NFR BLD AUTO: 5.12 10*3/MM3 (ref 1.7–7)
NEUTROPHILS NFR BLD AUTO: 67 % (ref 42.7–76)
NITRITE UR QL STRIP: NEGATIVE
NRBC BLD AUTO-RTO: 0 /100 WBC (ref 0–0.2)
OPIATES UR QL: POSITIVE
OXYCODONE UR QL SCN: NEGATIVE
PH UR STRIP.AUTO: <=5 [PH] (ref 5–8)
PLATELET # BLD AUTO: 109 10*3/MM3 (ref 140–450)
PMV BLD AUTO: 10.8 FL (ref 6–12)
POTASSIUM SERPL-SCNC: 4.3 MMOL/L (ref 3.5–5.2)
PROT SERPL-MCNC: 7.2 G/DL (ref 6–8.5)
PROT UR QL STRIP: NEGATIVE
QT INTERVAL: 375 MS
RBC # BLD AUTO: 3.52 10*6/MM3 (ref 4.14–5.8)
RBC # UR STRIP: ABNORMAL /HPF
REF LAB TEST METHOD: ABNORMAL
SODIUM SERPL-SCNC: 130 MMOL/L (ref 136–145)
SP GR UR STRIP: 1.02 (ref 1–1.03)
SQUAMOUS #/AREA URNS HPF: ABNORMAL /HPF
TRANS CELLS #/AREA URNS HPF: ABNORMAL /HPF
TROPONIN T SERPL-MCNC: <0.01 NG/ML (ref 0–0.03)
UROBILINOGEN UR QL STRIP: ABNORMAL
WBC # UR STRIP: ABNORMAL /HPF
WBC NRBC COR # BLD: 7.63 10*3/MM3 (ref 3.4–10.8)
WHOLE BLOOD HOLD SPECIMEN: NORMAL
WHOLE BLOOD HOLD SPECIMEN: NORMAL

## 2022-04-30 PROCEDURE — 82077 ASSAY SPEC XCP UR&BREATH IA: CPT | Performed by: EMERGENCY MEDICINE

## 2022-04-30 PROCEDURE — 93010 ELECTROCARDIOGRAM REPORT: CPT | Performed by: SPECIALIST

## 2022-04-30 PROCEDURE — 36415 COLL VENOUS BLD VENIPUNCTURE: CPT

## 2022-04-30 PROCEDURE — 82962 GLUCOSE BLOOD TEST: CPT

## 2022-04-30 PROCEDURE — 80053 COMPREHEN METABOLIC PANEL: CPT | Performed by: EMERGENCY MEDICINE

## 2022-04-30 PROCEDURE — 85025 COMPLETE CBC W/AUTO DIFF WBC: CPT | Performed by: EMERGENCY MEDICINE

## 2022-04-30 PROCEDURE — 80307 DRUG TEST PRSMV CHEM ANLYZR: CPT | Performed by: EMERGENCY MEDICINE

## 2022-04-30 PROCEDURE — 83735 ASSAY OF MAGNESIUM: CPT | Performed by: EMERGENCY MEDICINE

## 2022-04-30 PROCEDURE — 99284 EMERGENCY DEPT VISIT MOD MDM: CPT

## 2022-04-30 PROCEDURE — 71045 X-RAY EXAM CHEST 1 VIEW: CPT

## 2022-04-30 PROCEDURE — 81001 URINALYSIS AUTO W/SCOPE: CPT | Performed by: EMERGENCY MEDICINE

## 2022-04-30 PROCEDURE — 93005 ELECTROCARDIOGRAM TRACING: CPT | Performed by: EMERGENCY MEDICINE

## 2022-04-30 PROCEDURE — 84484 ASSAY OF TROPONIN QUANT: CPT | Performed by: EMERGENCY MEDICINE

## 2022-04-30 PROCEDURE — 82140 ASSAY OF AMMONIA: CPT | Performed by: EMERGENCY MEDICINE

## 2022-04-30 RX ORDER — LACTULOSE 10 G/15ML
20 SOLUTION ORAL 2 TIMES DAILY PRN
COMMUNITY
End: 2022-05-12 | Stop reason: SDUPTHER

## 2022-04-30 RX ORDER — LACTULOSE 10 G/15ML
30 SOLUTION ORAL ONCE
Status: COMPLETED | OUTPATIENT
Start: 2022-04-30 | End: 2022-04-30

## 2022-04-30 RX ORDER — SODIUM CHLORIDE 0.9 % (FLUSH) 0.9 %
10 SYRINGE (ML) INJECTION AS NEEDED
Status: DISCONTINUED | OUTPATIENT
Start: 2022-04-30 | End: 2022-04-30 | Stop reason: HOSPADM

## 2022-04-30 RX ADMIN — LACTULOSE 30 G: 20 SOLUTION ORAL at 10:09

## 2022-05-02 ENCOUNTER — TELEPHONE (OUTPATIENT)
Dept: CASE MANAGEMENT | Facility: OTHER | Age: 57
End: 2022-05-02

## 2022-05-02 DIAGNOSIS — R18.8 CIRRHOSIS OF LIVER WITH ASCITES, UNSPECIFIED HEPATIC CIRRHOSIS TYPE: Primary | ICD-10-CM

## 2022-05-02 DIAGNOSIS — K76.82 HEPATIC ENCEPHALOPATHY: ICD-10-CM

## 2022-05-02 DIAGNOSIS — K74.60 CIRRHOSIS OF LIVER WITH ASCITES, UNSPECIFIED HEPATIC CIRRHOSIS TYPE: Primary | ICD-10-CM

## 2022-05-02 DIAGNOSIS — I10 HYPERTENSION, UNSPECIFIED TYPE: ICD-10-CM

## 2022-05-02 RX ORDER — BUSPIRONE HYDROCHLORIDE 7.5 MG/1
TABLET ORAL
Qty: 90 TABLET | Refills: 1 | Status: ON HOLD | OUTPATIENT
Start: 2022-05-02 | End: 2022-06-17 | Stop reason: SDUPTHER

## 2022-05-02 RX ORDER — FLUTICASONE PROPIONATE 50 MCG
SPRAY, SUSPENSION (ML) NASAL
Qty: 16 G | Refills: 3 | Status: ON HOLD | OUTPATIENT
Start: 2022-05-02 | End: 2022-06-17 | Stop reason: SDUPTHER

## 2022-05-02 RX ORDER — SERTRALINE HYDROCHLORIDE 25 MG/1
TABLET, FILM COATED ORAL
Qty: 90 TABLET | Refills: 1 | Status: ON HOLD | OUTPATIENT
Start: 2022-05-02 | End: 2022-06-17 | Stop reason: SDUPTHER

## 2022-05-02 NOTE — TELEPHONE ENCOUNTER
Patient seen in the ED 4-30-22  Per ED report the patients labs were as follows :   Component   Ref Range & Units 2 d ago   (4/30/22) 2 wk ago   (4/12/22) 1 mo ago   (3/4/22) 2 mo ago   (3/2/22) 2 mo ago   (3/1/22) 2 mo ago   (2/28/22) 2 mo ago   (2/27/22)   Ammonia   16 - 60 umol/L 133 High   114 High   121 High   128 High   102 High   173 High   168 High     Resulting Agency [facility]              Specimen Collected: 04/30/22 07:43 Last Resulted: 04/30/22 08:20           Urine Drug Screen - Urine, Clean Catch  Order: [id number]   Status: Final result     Visible to patient: No (not released)     Next appt: 05/12/2022 at 10:15 AM in Internal Medicine (Jonh Franco Jr, MD)    Specimen Information: Urine, Clean Catch         0 Result Notes    Component   Ref Range & Units 2 d ago 4 mo ago 5 mo ago 8 mo ago   Amphet/Methamphet, Screen   Negative Positive Abnormal   Negative   Negative    Barbiturates Screen, Urine   Negative Negative  Negative  Negative  Negative    Benzodiazepine Screen, Urine   Negative Negative  Negative  Negative  Negative    Cocaine Screen, Urine   Negative Negative  Negative  Negative  Negative    Opiate Screen   Negative Positive Abnormal   Positive Abnormal   Negative Abnormal   Negative    THC, Screen, Urine   Negative Negative  Negative  Negative  Negative    Methadone Screen, Urine   Negative Negative  Negative  Negative  Negative    Oxycodone Screen, Urine   Negative Negative  Negative  Positive Abnormal   Positive Abnormal         I brought this info to the attention of the PCP And he requested a well ness check by PD because of the high Ammonia levels .   I called the [facility]'s dept and provided them with all requested information. I provided them my number as a call back and asked that they call me with any details and they stated that they would.     I reached out and advised PCP as well.

## 2022-05-03 ENCOUNTER — TELEPHONE (OUTPATIENT)
Dept: CASE MANAGEMENT | Facility: OTHER | Age: 57
End: 2022-05-03

## 2022-05-03 ENCOUNTER — TELEPHONE (OUTPATIENT)
Dept: INTERNAL MEDICINE | Facility: CLINIC | Age: 57
End: 2022-05-03

## 2022-05-03 DIAGNOSIS — K74.60 CIRRHOSIS OF LIVER WITH ASCITES, UNSPECIFIED HEPATIC CIRRHOSIS TYPE: Primary | ICD-10-CM

## 2022-05-03 DIAGNOSIS — I10 HYPERTENSION, UNSPECIFIED TYPE: ICD-10-CM

## 2022-05-03 DIAGNOSIS — R18.8 CIRRHOSIS OF LIVER WITH ASCITES, UNSPECIFIED HEPATIC CIRRHOSIS TYPE: Primary | ICD-10-CM

## 2022-05-03 DIAGNOSIS — K76.82 HEPATIC ENCEPHALOPATHY: ICD-10-CM

## 2022-05-03 NOTE — TELEPHONE ENCOUNTER
Pharmacy Name:  [facility]    Pharmacy representative name: VENUS    Pharmacy representative phone number: [phone]    What medication are you calling in regards to: OMEPRAZOLE    What question does the pharmacy have: PHARMACY SAID THAT PATIENT'S P/A TO TAKE OMEPRAZOLE TWICE DAILY HAD NOT COME THROUGH AND SHE WANTS TO KNOW IF SHE SHOULD FILL IT FOR ONCE DAILY. PLEASE CALL AND ADVISE.    Who is the provider that prescribed the medication:DR. AVERY    Additional notes: PLEASE CALL SUNI AND ADVISE.

## 2022-05-03 NOTE — TELEPHONE ENCOUNTER
I called [facility] office and was told that an officer had made contact with the patient and he appeared to be ok at that time and stated that he would reach out to the PCP office 5-3-22. PCP was advised of this information .

## 2022-05-06 ENCOUNTER — TELEPHONE (OUTPATIENT)
Dept: INTERNAL MEDICINE | Facility: CLINIC | Age: 57
End: 2022-05-06

## 2022-05-12 ENCOUNTER — OFFICE VISIT (OUTPATIENT)
Dept: INTERNAL MEDICINE | Facility: CLINIC | Age: 57
End: 2022-05-12

## 2022-05-12 VITALS
BODY MASS INDEX: 35.31 KG/M2 | HEART RATE: 95 BPM | DIASTOLIC BLOOD PRESSURE: 84 MMHG | SYSTOLIC BLOOD PRESSURE: 135 MMHG | OXYGEN SATURATION: 97 % | HEIGHT: 68 IN | WEIGHT: 233 LBS | TEMPERATURE: 97.5 F

## 2022-05-12 DIAGNOSIS — R18.8 CIRRHOSIS OF LIVER WITH ASCITES, UNSPECIFIED HEPATIC CIRRHOSIS TYPE: Primary | ICD-10-CM

## 2022-05-12 DIAGNOSIS — D64.9 ANEMIA, UNSPECIFIED TYPE: ICD-10-CM

## 2022-05-12 DIAGNOSIS — K27.4 GASTROINTESTINAL HEMORRHAGE ASSOCIATED WITH PEPTIC ULCER: ICD-10-CM

## 2022-05-12 DIAGNOSIS — K74.60 CIRRHOSIS OF LIVER WITH ASCITES, UNSPECIFIED HEPATIC CIRRHOSIS TYPE: Primary | ICD-10-CM

## 2022-05-12 DIAGNOSIS — R11.0 NAUSEA: ICD-10-CM

## 2022-05-12 DIAGNOSIS — Z23 NEED FOR PNEUMOCOCCAL VACCINATION: ICD-10-CM

## 2022-05-12 DIAGNOSIS — M25.511 ACUTE PAIN OF RIGHT SHOULDER: ICD-10-CM

## 2022-05-12 DIAGNOSIS — I10 HYPERTENSION, UNSPECIFIED TYPE: ICD-10-CM

## 2022-05-12 DIAGNOSIS — E78.5 HYPERLIPIDEMIA, UNSPECIFIED HYPERLIPIDEMIA TYPE: ICD-10-CM

## 2022-05-12 DIAGNOSIS — E11.9 DIABETES MELLITUS WITHOUT COMPLICATION: ICD-10-CM

## 2022-05-12 DIAGNOSIS — F15.10 AMPHETAMINE ABUSE: ICD-10-CM

## 2022-05-12 LAB
EXPIRATION DATE: ABNORMAL
HGB BLDA-MCNC: 11.3 G/DL (ref 12–17)
Lab: ABNORMAL

## 2022-05-12 PROCEDURE — 90677 PCV20 VACCINE IM: CPT | Performed by: INTERNAL MEDICINE

## 2022-05-12 PROCEDURE — 90471 IMMUNIZATION ADMIN: CPT | Performed by: INTERNAL MEDICINE

## 2022-05-12 PROCEDURE — 96372 THER/PROPH/DIAG INJ SC/IM: CPT | Performed by: INTERNAL MEDICINE

## 2022-05-12 PROCEDURE — 85018 HEMOGLOBIN: CPT | Performed by: INTERNAL MEDICINE

## 2022-05-12 PROCEDURE — 99214 OFFICE O/P EST MOD 30 MIN: CPT | Performed by: INTERNAL MEDICINE

## 2022-05-12 RX ORDER — LACTULOSE 10 G/15ML
20 SOLUTION ORAL 3 TIMES DAILY
Qty: 1892 ML | Refills: 3 | Status: ON HOLD | OUTPATIENT
Start: 2022-05-12 | End: 2022-06-17 | Stop reason: SDUPTHER

## 2022-05-12 RX ORDER — ONDANSETRON 4 MG/1
4 TABLET, ORALLY DISINTEGRATING ORAL 4 TIMES DAILY PRN
Qty: 60 TABLET | Refills: 0 | Status: SHIPPED | OUTPATIENT
Start: 2022-05-12 | End: 2022-06-17 | Stop reason: HOSPADM

## 2022-05-12 RX ORDER — KETOROLAC TROMETHAMINE 30 MG/ML
60 INJECTION, SOLUTION INTRAMUSCULAR; INTRAVENOUS ONCE
Status: COMPLETED | OUTPATIENT
Start: 2022-05-12 | End: 2022-05-12

## 2022-05-12 RX ADMIN — KETOROLAC TROMETHAMINE 60 MG: 30 INJECTION, SOLUTION INTRAMUSCULAR; INTRAVENOUS at 11:00

## 2022-05-12 NOTE — PROGRESS NOTES
Chief Complaint  Shoulder Pain (Right shoulder ), Follow-up (1 month ), and Abdominal Pain (Patient states he is feel nausea. /Patient states this morning he got sick due to his stomach hurting )    Subjective          Nic Nicolas presents to [facility]  History of Present Illness   Patient reports medicine is in blister packs from pharmacy to help with compliance.   Patient reports feeling nauseated this AM. Patient reports staying sick to his stomach. Patient reports NBNB emesis this am.   Patient report shaving right shoulder pain that is worsened. Patient reports history of fracture of right shoulder.   DM2- patient reports blood glucose have been good. Patient denies hypoglycemic events.   Discussed +amphetamine test from ER with patient. He admits to taking nonprescribed adderall previously and thinks that may be the culprit.   Cirrhosis- patient reports taking lactulose regularly. Patient reports having 2 BMS per day.   hyperlipidemia- patient reports compliance with statin  GERD- patient reports compliance with prilosec.     Current Outpatient Medications   Medication Instructions   • albuterol sulfate  (90 Base) MCG/ACT inhaler 2 puffs, Inhalation, Every 4 Hours PRN   • atorvastatin (LIPITOR) 40 mg, Oral, Daily   • B-D UF III MINI PEN NEEDLES 31G X 5 MM misc USE AS DIRECTED TO INJECT INSULIN FOUR TIMES A DAY   • BD Pen Needle Kasey U/F 32G X 4 MM misc USET O INJECT INSULIN FOUR TIMES A DAY   • busPIRone (BUSPAR) 7.5 MG tablet TAKE ONE TABLET BY MOUTH THREE TIMES A DAY   • Diclofenac Sodium (VOLTAREN) 1 % gel gel APPLY TO AFFECTED AREA EVERY 8 HOURS IF NEEDED FOR PAIN   • fluticasone (FLONASE) 50 MCG/ACT nasal spray SPRAY 1 SPRAY IN EACH NOSTRIL EVERY DAY   • fluticasone (FLOVENT DISKUS) 50 MCG/BLIST diskus inhaler 1 puff, Inhalation, 2 Times Daily - RT   • furosemide (LASIX) 40 mg, Oral, 2 Times Daily   • glucose blood test strip USE TO TEST BLOOD  "SUGAR FIVE TIMES PER DAY   • insulin detemir (LEVEMIR) 15 Units, Subcutaneous, Nightly   • insulin lispro (HUMALOG) 0-7 Units, Subcutaneous, 3 Times Daily Before Meals   • lactulose (CHRONULAC) 20 g, Oral, 3 Times Daily   • Lancets (freestyle) lancets USE TO CHECK BLOOD SUGAR 5 TIMES PER DAY.   • omeprazole (PRILOSEC) 40 mg, Oral, 2 Times Daily   • ondansetron ODT (ZOFRAN-ODT) 4 mg, Sublingual, 4 Times Daily PRN   • potassium chloride (K-DUR,KLOR-CON) 20 MEQ CR tablet 20 mEq, Oral, Daily   • riFAXIMin (XIFAXAN) 550 mg, Oral, Every 12 Hours Scheduled   • sertraline (ZOLOFT) 25 MG tablet TAKE ONE TABLET BY MOUTH EVERY NIGHT AT BEDTIME (ALONG WITH 50MG TABLET)   • spironolactone (ALDACTONE) 100 mg, Oral, 2 Times Daily       The following portions of the patient's history were reviewed and updated as appropriate: allergies, current medications, past family history, past medical history, past social history, past surgical history, and problem list.    Objective   Vital Signs:   /84 (BP Location: Left arm, Patient Position: Sitting)   Pulse 95   Temp 97.5 °F (36.4 °C) (Temporal)   Ht 172.7 cm (68\")   Wt 106 kg (233 lb)   SpO2 97%   BMI 35.43 kg/m²     Wt Readings from Last 3 Encounters:   05/12/22 106 kg (233 lb)   04/30/22 102 kg (225 lb)   04/12/22 118 kg (260 lb 9.6 oz)     BP Readings from Last 3 Encounters:   05/12/22 135/84   04/30/22 112/71   04/12/22 121/77     Physical Exam   Appearance: No acute distress, well-nourished  Head: normocephalic, atraumatic  Eyes: extraocular movements intact, no scleral icterus, no conjunctival injection  Ears, Nose, and Throat: external ears normal, nares patent, moist mucous membranes  Cardiovascular: regular rate and rhythm. no murmurs, rubs, or gallops. no edema  Respiratory: breathing comfortably, symmetric chest rise, clear to auscultation bilaterally. No wheezes, rales, or rhonchi.  Neuro: alert and oriented to time, place, and person. Normal gait  Psych: normal " mood and affect     Result Review :   The following data was reviewed by: Jonh Franco Jr, MD on 05/12/2022:  Common labs    Common Labsle 3/8/22 3/8/22 4/12/22 4/12/22 4/12/22 4/12/22 4/30/22 4/30/22    0521 0521 1727 1727 1727 1727 0743 0743   Glucose  134 (A)  128 (A)    157 (A)   BUN  32 (A)  18    34 (A)   Creatinine  1.42 (A)  1.01    1.68 (A)   Sodium  130 (A)  140    130 (A)   Potassium  4.6  5.2    4.3   Chloride  101  109 (A)    94 (A)   Calcium  9.1  7.8 (A)    9.7   Albumin  3.10 (A)  3.40 (A)    3.80   Total Bilirubin  1.1  1.5 (A)    4.2 (A)   Alkaline Phosphatase  154 (A)  128 (A)    139 (A)   AST (SGOT)  57 (A)  46 (A)    52 (A)   ALT (SGPT)  37  37    27   WBC 4.59    4.37  7.63    Hemoglobin 10.6 (A)    10.8 (A)  11.6 (A)    Hematocrit 29.8 (A)    31.3 (A)  32.5 (A)    Platelets 76 (A)    82 (A)  109 (A)    Total Cholesterol   110        Triglycerides   69        HDL Cholesterol   63 (A)        LDL Cholesterol    32        Hemoglobin A1C      6.40 (A)     (A) Abnormal value              Lab Results   Component Value Date    COVID19 Not Detected 03/08/2022    RSV Not Detected 01/20/2022    INR 1.60 (L) 02/24/2022    BILIRUBINUR Small (1+) (A) 04/30/2022          Assessment and Plan    Diagnoses and all orders for this visit:    1. Cirrhosis of liver with ascites, unspecified hepatic cirrhosis type (HCC) (Primary)  Comments:  encouraged inc lactulose with goal of 3-5 BMs per day. check ammonia level.   Orders:  -     lactulose (CHRONULAC) 10 GM/15ML solution; Take 30 mL by mouth 3 (Three) Times a Day.  Dispense: 1892 mL; Refill: 3  -     Ammonia; Future  -     Comprehensive Metabolic Panel; Future  -     CBC & Differential; Future  -     TSH; Future    2. Nausea  Comments:  will Rx zofran to use as needed. check CBC to monitor for GI blood loss as culprit. check labs to further eval.   Orders:  -     ondansetron ODT (ZOFRAN-ODT) 4 MG disintegrating tablet; Place 1 tablet under the tongue 4  (Four) Times a Day As Needed for Nausea or Vomiting.  Dispense: 60 tablet; Refill: 0    3. Hyperlipidemia, unspecified hyperlipidemia type  Comments:  cont statin    4. Hypertension, unspecified type  Comments:  good control. cont regimen  Orders:  -     Comprehensive Metabolic Panel; Future    5. Diabetes mellitus without complication (HCC)  Comments:  ok BG control. cont regimen.   Orders:  -     Comprehensive Metabolic Panel; Future    6. Gastrointestinal hemorrhage associated with peptic ulcer  Comments:  cont PPI.     7. Anemia, unspecified type  -     CBC & Differential; Future    8. Acute pain of right shoulder  Comments:  toradol IM today for pain. avoiding opiates. refer to ortho  Orders:  -     Ambulatory Referral to Orthopedic Surgery  -     ketorolac (TORADOL) injection 60 mg    9. Need for pneumococcal vaccination  -     Pneumococcal Conjugate Vaccine 20-Valent All    10. Amphetamine abuse (HCC)  Comments:  discussed need to avoid these medication given tenuous medical conditions.   Overview:  discussed need to avoid these medication given tenuous medical conditions.           Medications Discontinued During This Encounter   Medication Reason   • Generlac 10 GM/15ML solution solution (encephalopathy) Alternate therapy   • ondansetron ODT (ZOFRAN-ODT) 4 MG disintegrating tablet Reorder   • lactulose (CHRONULAC) 10 GM/15ML solution Reorder          Follow Up   No follow-ups on file.  Patient was given instructions and counseling regarding his condition or for health maintenance advice. Please see specific information pulled into the AVS if appropriate.       Jonh Franco Jr, MD  05/12/22  10:56 EDT

## 2022-05-19 ENCOUNTER — TELEPHONE (OUTPATIENT)
Dept: CASE MANAGEMENT | Facility: OTHER | Age: 57
End: 2022-05-19

## 2022-05-19 DIAGNOSIS — I10 HYPERTENSION, UNSPECIFIED TYPE: ICD-10-CM

## 2022-05-19 DIAGNOSIS — K74.60 CIRRHOSIS OF LIVER WITH ASCITES, UNSPECIFIED HEPATIC CIRRHOSIS TYPE: Primary | ICD-10-CM

## 2022-05-19 DIAGNOSIS — R18.8 CIRRHOSIS OF LIVER WITH ASCITES, UNSPECIFIED HEPATIC CIRRHOSIS TYPE: Primary | ICD-10-CM

## 2022-05-19 DIAGNOSIS — K76.82 HEPATIC ENCEPHALOPATHY: ICD-10-CM

## 2022-05-23 ENCOUNTER — TELEPHONE (OUTPATIENT)
Dept: INTERNAL MEDICINE | Facility: CLINIC | Age: 57
End: 2022-05-23

## 2022-05-23 NOTE — TELEPHONE ENCOUNTER
Would recommend to go to ER if he feels he is vomiting blood. His condition is very tenuous. Please call back ASAP. Thank you.

## 2022-05-23 NOTE — TELEPHONE ENCOUNTER
Caller: Nic Nicolas    Relationship: Self    Best call back number: [phone]    What is the best time to reach you: ANY    Who are you requesting to speak with (clinical staff, provider,  specific staff member): CLINICAL    call regarding: PATIENT STATED: HE HAS BEEN VOMITING FOR 2 DAYS, HAS TASTE OF BLOOD IN HIS MOUTH, THAT HE SUSPECTS THAT HIS AMOENA IS HIGH DUE TO SARCOSIS OF LIVER AND REQUESTING A CALL TO SEE IF HE NEEDS TO BE HOSPITALIZED. THERE WERE NO VISITS WITH DR AVERY AND HE DECLINED TO SEE ANY OTHER PROVIDER.     Do you require a callback: YES

## 2022-05-23 NOTE — TELEPHONE ENCOUNTER
SPOKE WITH PATIENT    INFORMED HIM THAT DR. AVERY RECOMMENDS HE GO TO THE ER.    PATIENT STATED HE HAS TO TRY TO CONTACT HIS SISTER TO TAKE HIM SINCE HE DOES NOT DRIVE. HE STATES HE MAY NOT BE ABLE TO GO TODAY BUT COULD GO IN THE MORNING IF HIS SISTER COULD DRIVE HIM.    I TOLD HIM SEVERAL TIMES THAT IF HE FEELS WORSE THAT HE CAN CALL 911 TO COME AND GET HIM AND HE VOICED UNDERSTANDING AND SAID THAT HE WOULD.    I TOLD HIM THAT DR. AVERY REALLY THOUGHT HE SHOULD GO TO THE ER. HE VOICED UNDERSTANDING.    HE STATED AGAIN THAT HE HAS NOT HAD A BOWEL MOVEMENT AND WANTED TO KNOW IF HE COULD UP HIS DOSE OF LACTOSE TO HELP WITH BOWEL MOVEMENT SINCE HE HAS NOT HAD ONE SINCE Saturday.    PER DR. AVERY HE CAN UP HIS DOSE AND TO FOLLOW UP IF ANY OTHER QUESTIONS OR CONCERNS. PATIENT VOICED UNDERSTANDING.

## 2022-05-23 NOTE — TELEPHONE ENCOUNTER
Spoke with patient    He states he is not vomiting blood. It is clear phlegm.     He has been nauseous and having abdominal pain since Saturday.  No diarrhea. He states his last bowel movement was Saturday and he went 2-3 times.    He wants to know if he needs to up his lactose dose if you think not having a bowel movement is causing pain.    He has an appointment 6/27 but wants to know if you think he needs to move it up?    He states he is just not feeling like himself.

## 2022-05-24 ENCOUNTER — APPOINTMENT (OUTPATIENT)
Dept: CT IMAGING | Facility: HOSPITAL | Age: 57
End: 2022-05-24

## 2022-05-24 ENCOUNTER — HOSPITAL ENCOUNTER (INPATIENT)
Facility: HOSPITAL | Age: 57
LOS: 24 days | Discharge: HOME | End: 2022-06-17
Attending: EMERGENCY MEDICINE | Admitting: INTERNAL MEDICINE

## 2022-05-24 ENCOUNTER — APPOINTMENT (OUTPATIENT)
Dept: GENERAL RADIOLOGY | Facility: HOSPITAL | Age: 57
End: 2022-05-24

## 2022-05-24 DIAGNOSIS — K76.82 HEPATIC ENCEPHALOPATHY: Primary | ICD-10-CM

## 2022-05-24 DIAGNOSIS — G89.29 CHRONIC LOW BACK PAIN, UNSPECIFIED BACK PAIN LATERALITY, UNSPECIFIED WHETHER SCIATICA PRESENT: ICD-10-CM

## 2022-05-24 DIAGNOSIS — R18.8 CIRRHOSIS OF LIVER WITH ASCITES, UNSPECIFIED HEPATIC CIRRHOSIS TYPE: ICD-10-CM

## 2022-05-24 DIAGNOSIS — K27.4 GASTROINTESTINAL HEMORRHAGE ASSOCIATED WITH PEPTIC ULCER: ICD-10-CM

## 2022-05-24 DIAGNOSIS — E11.9 DIABETES MELLITUS WITHOUT COMPLICATION: ICD-10-CM

## 2022-05-24 DIAGNOSIS — S32.401A CLOSED NONDISPLACED FRACTURE OF RIGHT ACETABULUM, UNSPECIFIED PORTION OF ACETABULUM, INITIAL ENCOUNTER: ICD-10-CM

## 2022-05-24 DIAGNOSIS — M54.50 CHRONIC LOW BACK PAIN, UNSPECIFIED BACK PAIN LATERALITY, UNSPECIFIED WHETHER SCIATICA PRESENT: ICD-10-CM

## 2022-05-24 DIAGNOSIS — R11.0 NAUSEA: ICD-10-CM

## 2022-05-24 DIAGNOSIS — Z78.9 DECREASED ACTIVITIES OF DAILY LIVING (ADL): ICD-10-CM

## 2022-05-24 DIAGNOSIS — K74.60 CIRRHOSIS OF LIVER WITH ASCITES, UNSPECIFIED HEPATIC CIRRHOSIS TYPE: ICD-10-CM

## 2022-05-24 DIAGNOSIS — R13.12 OROPHARYNGEAL DYSPHAGIA: ICD-10-CM

## 2022-05-24 DIAGNOSIS — R26.2 DIFFICULTY WALKING: ICD-10-CM

## 2022-05-24 DIAGNOSIS — I63.9 CEREBROVASCULAR ACCIDENT (CVA), UNSPECIFIED MECHANISM: ICD-10-CM

## 2022-05-24 LAB
ALBUMIN SERPL-MCNC: 3.7 G/DL (ref 3.5–5.2)
ALBUMIN/GLOB SERPL: 1 G/DL
ALP SERPL-CCNC: 120 U/L (ref 39–117)
ALT SERPL W P-5'-P-CCNC: 27 U/L (ref 1–41)
AMMONIA BLD-SCNC: 218 UMOL/L (ref 16–60)
AMPHET+METHAMPHET UR QL: NEGATIVE
ANION GAP SERPL CALCULATED.3IONS-SCNC: 16.1 MMOL/L (ref 5–15)
APAP SERPL-MCNC: <5 MCG/ML (ref 0–30)
ARTERIAL PATENCY WRIST A: POSITIVE
AST SERPL-CCNC: 35 U/L (ref 1–40)
BARBITURATES UR QL SCN: NEGATIVE
BASE EXCESS BLDA CALC-SCNC: -6.3 MMOL/L (ref -2–2)
BASE EXCESS BLDV CALC-SCNC: -2.7 MMOL/L (ref -2–2)
BASOPHILS # BLD AUTO: 0.05 10*3/MM3 (ref 0–0.2)
BASOPHILS NFR BLD AUTO: 0.7 % (ref 0–1.5)
BDY SITE: ABNORMAL
BDY SITE: ABNORMAL
BENZODIAZ UR QL SCN: NEGATIVE
BILIRUB SERPL-MCNC: 2.8 MG/DL (ref 0–1.2)
BILIRUB UR QL STRIP: NEGATIVE
BUN SERPL-MCNC: 28 MG/DL (ref 6–20)
BUN/CREAT SERPL: 14.9 (ref 7–25)
CA-I BLDA-SCNC: 1.23 MMOL/L (ref 1.13–1.32)
CALCIUM SPEC-SCNC: 10.5 MG/DL (ref 8.6–10.5)
CANNABINOIDS SERPL QL: NEGATIVE
CHLORIDE BLDA-SCNC: 111 MMOL/L (ref 98–106)
CHLORIDE SERPL-SCNC: 103 MMOL/L (ref 98–107)
CLARITY UR: CLEAR
CO2 SERPL-SCNC: 17.9 MMOL/L (ref 22–29)
COCAINE UR QL: NEGATIVE
COHGB MFR BLD: 0.3 % (ref 0–1.5)
COHGB MFR BLD: 0.9 % (ref 0–1.5)
COLOR UR: ABNORMAL
CREAT SERPL-MCNC: 1.88 MG/DL (ref 0.76–1.27)
D-LACTATE SERPL-SCNC: 4.5 MMOL/L (ref 0.5–2)
D-LACTATE SERPL-SCNC: 4.7 MMOL/L (ref 0.5–2)
D-LACTATE SERPL-SCNC: 6.2 MMOL/L (ref 0.5–2)
DEPRECATED RDW RBC AUTO: 46.6 FL (ref 37–54)
EGFRCR SERPLBLD CKD-EPI 2021: 41.4 ML/MIN/1.73
EOSINOPHIL # BLD AUTO: 0.19 10*3/MM3 (ref 0–0.4)
EOSINOPHIL NFR BLD AUTO: 2.7 % (ref 0.3–6.2)
ERYTHROCYTE [DISTWIDTH] IN BLOOD BY AUTOMATED COUNT: 14.2 % (ref 12.3–15.4)
ETHANOL BLD-MCNC: <10 MG/DL (ref 0–10)
ETHANOL UR QL: <0.01 %
FHHB: 2.2 % (ref 0–5)
FHHB: 3 % (ref 0–5)
GAS FLOW AIRWAY: 0 LPM
GLOBULIN UR ELPH-MCNC: 3.6 GM/DL
GLUCOSE BLDA-MCNC: 156 MMOL/L (ref 70–99)
GLUCOSE BLDC GLUCOMTR-MCNC: 191 MG/DL (ref 70–99)
GLUCOSE BLDC GLUCOMTR-MCNC: 225 MG/DL (ref 70–99)
GLUCOSE SERPL-MCNC: 243 MG/DL (ref 65–99)
GLUCOSE UR STRIP-MCNC: ABNORMAL MG/DL
HCO3 BLDA-SCNC: 13.4 MMOL/L (ref 22–26)
HCO3 BLDV-SCNC: 18.2 MMOL/L (ref 22–26)
HCT VFR BLD AUTO: 34.1 % (ref 37.5–51)
HGB BLD-MCNC: 12 G/DL (ref 13–17.7)
HGB BLDA-MCNC: 13.4 G/DL (ref 13.8–16.4)
HGB BLDA-MCNC: 14 G/DL (ref 13.8–16.4)
HGB UR QL STRIP.AUTO: NEGATIVE
HOLD SPECIMEN: NORMAL
HOLD SPECIMEN: NORMAL
IMM GRANULOCYTES # BLD AUTO: 0.02 10*3/MM3 (ref 0–0.05)
IMM GRANULOCYTES NFR BLD AUTO: 0.3 % (ref 0–0.5)
INHALED O2 CONCENTRATION: 21 %
INHALED O2 CONCENTRATION: ABNORMAL %
KETONES UR QL STRIP: NEGATIVE
LACTATE BLDA-SCNC: 7.8 MMOL/L (ref 0.5–2)
LEUKOCYTE ESTERASE UR QL STRIP.AUTO: NEGATIVE
LYMPHOCYTES # BLD AUTO: 1.01 10*3/MM3 (ref 0.7–3.1)
LYMPHOCYTES NFR BLD AUTO: 14.6 % (ref 19.6–45.3)
MAGNESIUM SERPL-MCNC: 1.7 MG/DL (ref 1.6–2.6)
MCH RBC QN AUTO: 31.8 PG (ref 26.6–33)
MCHC RBC AUTO-ENTMCNC: 35.2 G/DL (ref 31.5–35.7)
MCV RBC AUTO: 90.5 FL (ref 79–97)
METHADONE UR QL SCN: NEGATIVE
METHGB BLD QL: 0.1 % (ref 0–1.5)
METHGB BLD QL: 0.1 % (ref 0–1.5)
MODALITY: ABNORMAL
MODALITY: ABNORMAL
MONOCYTES # BLD AUTO: 0.61 10*3/MM3 (ref 0.1–0.9)
MONOCYTES NFR BLD AUTO: 8.8 % (ref 5–12)
NEUTROPHILS NFR BLD AUTO: 5.06 10*3/MM3 (ref 1.7–7)
NEUTROPHILS NFR BLD AUTO: 72.9 % (ref 42.7–76)
NITRITE UR QL STRIP: NEGATIVE
NOTE: ABNORMAL
NOTE: ABNORMAL
NRBC BLD AUTO-RTO: 0 /100 WBC (ref 0–0.2)
OPIATES UR QL: POSITIVE
OXYCODONE UR QL SCN: NEGATIVE
OXYHGB MFR BLDV: 96.6 % (ref 94–99)
OXYHGB MFR BLDV: 96.8 % (ref 94–99)
PCO2 BLDA: 16.4 MM HG (ref 35–45)
PCO2 BLDV: 22.7 MM HG (ref 41–51)
PH BLDA: 7.53 PH UNITS (ref 7.35–7.45)
PH BLDV: 7.52 PH UNITS (ref 7.31–7.41)
PH UR STRIP.AUTO: 6 [PH] (ref 5–8)
PLATELET # BLD AUTO: 104 10*3/MM3 (ref 140–450)
PMV BLD AUTO: 10.7 FL (ref 6–12)
PO2 BLD: 435 MM[HG] (ref 0–500)
PO2 BLDA: 91.3 MM HG (ref 80–100)
PO2 BLDV: 113.1 MM HG (ref 35–42)
POTASSIUM BLDA-SCNC: 4.94 MMOL/L (ref 3.5–5)
POTASSIUM SERPL-SCNC: 4.6 MMOL/L (ref 3.5–5.2)
PROT SERPL-MCNC: 7.3 G/DL (ref 6–8.5)
PROT UR QL STRIP: NEGATIVE
RBC # BLD AUTO: 3.77 10*6/MM3 (ref 4.14–5.8)
SALICYLATES SERPL-MCNC: <0.3 MG/DL
SAO2 % BLDCOA: 97 % (ref 95–99)
SAO2 % BLDCOV: 97.8 % (ref 45–75)
SODIUM BLDA-SCNC: 145.7 MMOL/L (ref 136–146)
SODIUM SERPL-SCNC: 137 MMOL/L (ref 136–145)
SP GR UR STRIP: 1.02 (ref 1–1.03)
T4 FREE SERPL-MCNC: 1.44 NG/DL (ref 0.93–1.7)
TROPONIN T SERPL-MCNC: <0.01 NG/ML (ref 0–0.03)
TSH SERPL DL<=0.05 MIU/L-ACNC: 2.97 UIU/ML (ref 0.27–4.2)
UROBILINOGEN UR QL STRIP: ABNORMAL
WBC NRBC COR # BLD: 6.94 10*3/MM3 (ref 3.4–10.8)
WHOLE BLOOD HOLD COAG: NORMAL
WHOLE BLOOD HOLD SPECIMEN: NORMAL

## 2022-05-24 PROCEDURE — 93005 ELECTROCARDIOGRAM TRACING: CPT | Performed by: EMERGENCY MEDICINE

## 2022-05-24 PROCEDURE — 82820 HEMOGLOBIN-OXYGEN AFFINITY: CPT | Performed by: EMERGENCY MEDICINE

## 2022-05-24 PROCEDURE — 74176 CT ABD & PELVIS W/O CONTRAST: CPT

## 2022-05-24 PROCEDURE — 80307 DRUG TEST PRSMV CHEM ANLYZR: CPT | Performed by: EMERGENCY MEDICINE

## 2022-05-24 PROCEDURE — 80179 DRUG ASSAY SALICYLATE: CPT | Performed by: EMERGENCY MEDICINE

## 2022-05-24 PROCEDURE — 82375 ASSAY CARBOXYHB QUANT: CPT | Performed by: FAMILY MEDICINE

## 2022-05-24 PROCEDURE — 71045 X-RAY EXAM CHEST 1 VIEW: CPT

## 2022-05-24 PROCEDURE — 83605 ASSAY OF LACTIC ACID: CPT | Performed by: EMERGENCY MEDICINE

## 2022-05-24 PROCEDURE — 85025 COMPLETE CBC W/AUTO DIFF WBC: CPT

## 2022-05-24 PROCEDURE — 83735 ASSAY OF MAGNESIUM: CPT

## 2022-05-24 PROCEDURE — 87040 BLOOD CULTURE FOR BACTERIA: CPT | Performed by: EMERGENCY MEDICINE

## 2022-05-24 PROCEDURE — 25010000002 ZIPRASIDONE MESYLATE PER 10 MG: Performed by: NURSE PRACTITIONER

## 2022-05-24 PROCEDURE — 25010000002 LORAZEPAM PER 2 MG: Performed by: NURSE PRACTITIONER

## 2022-05-24 PROCEDURE — 36415 COLL VENOUS BLD VENIPUNCTURE: CPT | Performed by: EMERGENCY MEDICINE

## 2022-05-24 PROCEDURE — 25010000002 HALOPERIDOL LACTATE PER 5 MG: Performed by: FAMILY MEDICINE

## 2022-05-24 PROCEDURE — 82805 BLOOD GASES W/O2 SATURATION: CPT | Performed by: FAMILY MEDICINE

## 2022-05-24 PROCEDURE — 80053 COMPREHEN METABOLIC PANEL: CPT

## 2022-05-24 PROCEDURE — 82805 BLOOD GASES W/O2 SATURATION: CPT | Performed by: EMERGENCY MEDICINE

## 2022-05-24 PROCEDURE — 80143 DRUG ASSAY ACETAMINOPHEN: CPT | Performed by: EMERGENCY MEDICINE

## 2022-05-24 PROCEDURE — 83050 HGB METHEMOGLOBIN QUAN: CPT | Performed by: FAMILY MEDICINE

## 2022-05-24 PROCEDURE — 82962 GLUCOSE BLOOD TEST: CPT

## 2022-05-24 PROCEDURE — 25010000002 LORAZEPAM PER 2 MG: Performed by: FAMILY MEDICINE

## 2022-05-24 PROCEDURE — 99223 1ST HOSP IP/OBS HIGH 75: CPT | Performed by: FAMILY MEDICINE

## 2022-05-24 PROCEDURE — 84484 ASSAY OF TROPONIN QUANT: CPT

## 2022-05-24 PROCEDURE — 84443 ASSAY THYROID STIM HORMONE: CPT | Performed by: EMERGENCY MEDICINE

## 2022-05-24 PROCEDURE — 84439 ASSAY OF FREE THYROXINE: CPT | Performed by: EMERGENCY MEDICINE

## 2022-05-24 PROCEDURE — 81003 URINALYSIS AUTO W/O SCOPE: CPT | Performed by: EMERGENCY MEDICINE

## 2022-05-24 PROCEDURE — 70450 CT HEAD/BRAIN W/O DYE: CPT

## 2022-05-24 PROCEDURE — 63710000001 INSULIN DETEMIR PER 5 UNITS: Performed by: FAMILY MEDICINE

## 2022-05-24 PROCEDURE — 36600 WITHDRAWAL OF ARTERIAL BLOOD: CPT | Performed by: FAMILY MEDICINE

## 2022-05-24 PROCEDURE — 82077 ASSAY SPEC XCP UR&BREATH IA: CPT | Performed by: EMERGENCY MEDICINE

## 2022-05-24 PROCEDURE — 82140 ASSAY OF AMMONIA: CPT

## 2022-05-24 PROCEDURE — 93005 ELECTROCARDIOGRAM TRACING: CPT

## 2022-05-24 PROCEDURE — 25010000002 HALOPERIDOL LACTATE PER 5 MG: Performed by: NURSE PRACTITIONER

## 2022-05-24 PROCEDURE — 99285 EMERGENCY DEPT VISIT HI MDM: CPT

## 2022-05-24 RX ORDER — LACTULOSE 10 G/15ML
300 SOLUTION ORAL DAILY PRN
Status: DISCONTINUED | OUTPATIENT
Start: 2022-05-24 | End: 2022-06-17 | Stop reason: HOSPADM

## 2022-05-24 RX ORDER — NICOTINE POLACRILEX 4 MG
24 LOZENGE BUCCAL
Status: DISCONTINUED | OUTPATIENT
Start: 2022-05-24 | End: 2022-06-17 | Stop reason: HOSPADM

## 2022-05-24 RX ORDER — FENTANYL CITRATE 50 UG/ML
25 INJECTION, SOLUTION INTRAMUSCULAR; INTRAVENOUS ONCE
Status: DISCONTINUED | OUTPATIENT
Start: 2022-05-24 | End: 2022-05-24

## 2022-05-24 RX ORDER — LACTULOSE 10 G/15ML
20 SOLUTION ORAL ONCE
Status: DISCONTINUED | OUTPATIENT
Start: 2022-05-24 | End: 2022-05-25

## 2022-05-24 RX ORDER — CEFTRIAXONE SODIUM 2 G/50ML
2 INJECTION, SOLUTION INTRAVENOUS ONCE
Status: DISCONTINUED | OUTPATIENT
Start: 2022-05-24 | End: 2022-05-25

## 2022-05-24 RX ORDER — LACTULOSE 10 G/15ML
30 SOLUTION ORAL
Status: DISCONTINUED | OUTPATIENT
Start: 2022-05-24 | End: 2022-05-25

## 2022-05-24 RX ORDER — SODIUM CHLORIDE 0.9 % (FLUSH) 0.9 %
10 SYRINGE (ML) INJECTION AS NEEDED
Status: DISCONTINUED | OUTPATIENT
Start: 2022-05-24 | End: 2022-06-17 | Stop reason: HOSPADM

## 2022-05-24 RX ORDER — SODIUM CHLORIDE 0.9 % (FLUSH) 0.9 %
10 SYRINGE (ML) INJECTION EVERY 12 HOURS SCHEDULED
Status: DISCONTINUED | OUTPATIENT
Start: 2022-05-24 | End: 2022-06-17 | Stop reason: HOSPADM

## 2022-05-24 RX ORDER — DEXMEDETOMIDINE HYDROCHLORIDE 4 UG/ML
.2-1.5 INJECTION, SOLUTION INTRAVENOUS
Status: DISCONTINUED | OUTPATIENT
Start: 2022-05-24 | End: 2022-05-26

## 2022-05-24 RX ORDER — HALOPERIDOL 5 MG/ML
2 INJECTION INTRAMUSCULAR ONCE
Status: COMPLETED | OUTPATIENT
Start: 2022-05-24 | End: 2022-05-24

## 2022-05-24 RX ORDER — INSULIN LISPRO 100 [IU]/ML
0-9 INJECTION, SOLUTION INTRAVENOUS; SUBCUTANEOUS EVERY 6 HOURS
Status: DISCONTINUED | OUTPATIENT
Start: 2022-05-25 | End: 2022-05-28

## 2022-05-24 RX ORDER — LACTULOSE 10 G/15ML
300 SOLUTION ORAL ONCE
Status: COMPLETED | OUTPATIENT
Start: 2022-05-24 | End: 2022-05-25

## 2022-05-24 RX ORDER — ZIPRASIDONE MESYLATE 20 MG/ML
10 INJECTION, POWDER, LYOPHILIZED, FOR SOLUTION INTRAMUSCULAR ONCE
Status: COMPLETED | OUTPATIENT
Start: 2022-05-24 | End: 2022-05-24

## 2022-05-24 RX ORDER — INSULIN LISPRO 100 [IU]/ML
0-9 INJECTION, SOLUTION INTRAVENOUS; SUBCUTANEOUS
Status: DISCONTINUED | OUTPATIENT
Start: 2022-05-24 | End: 2022-05-24

## 2022-05-24 RX ORDER — DEXTROSE MONOHYDRATE 25 G/50ML
25 INJECTION, SOLUTION INTRAVENOUS
Status: DISCONTINUED | OUTPATIENT
Start: 2022-05-24 | End: 2022-06-17 | Stop reason: HOSPADM

## 2022-05-24 RX ORDER — ONDANSETRON 2 MG/ML
4 INJECTION INTRAMUSCULAR; INTRAVENOUS EVERY 6 HOURS PRN
Status: DISCONTINUED | OUTPATIENT
Start: 2022-05-24 | End: 2022-05-28 | Stop reason: SDUPTHER

## 2022-05-24 RX ORDER — LORAZEPAM 2 MG/ML
4 INJECTION INTRAMUSCULAR ONCE
Status: COMPLETED | OUTPATIENT
Start: 2022-05-24 | End: 2022-05-24

## 2022-05-24 RX ORDER — HALOPERIDOL 5 MG/ML
5 INJECTION INTRAMUSCULAR ONCE
Status: COMPLETED | OUTPATIENT
Start: 2022-05-24 | End: 2022-05-24

## 2022-05-24 RX ORDER — LORAZEPAM 2 MG/ML
1 INJECTION INTRAMUSCULAR EVERY 4 HOURS PRN
Status: DISCONTINUED | OUTPATIENT
Start: 2022-05-24 | End: 2022-05-26

## 2022-05-24 RX ORDER — ONDANSETRON 2 MG/ML
4 INJECTION INTRAMUSCULAR; INTRAVENOUS ONCE
Status: DISCONTINUED | OUTPATIENT
Start: 2022-05-24 | End: 2022-05-31

## 2022-05-24 RX ADMIN — DEXMEDETOMIDINE HYDROCHLORIDE 1.5 MCG/KG/HR: 400 INJECTION, SOLUTION INTRAVENOUS at 22:08

## 2022-05-24 RX ADMIN — INSULIN DETEMIR 10 UNITS: 100 INJECTION, SOLUTION SUBCUTANEOUS at 23:48

## 2022-05-24 RX ADMIN — ZIPRASIDONE MESYLATE 10 MG: 20 INJECTION, POWDER, LYOPHILIZED, FOR SOLUTION INTRAMUSCULAR at 22:13

## 2022-05-24 RX ADMIN — LACTULOSE 30 G: 20 SOLUTION ORAL at 23:52

## 2022-05-24 RX ADMIN — HALOPERIDOL LACTATE 2 MG: 5 INJECTION, SOLUTION INTRAMUSCULAR at 16:55

## 2022-05-24 RX ADMIN — HALOPERIDOL LACTATE 5 MG: 5 INJECTION, SOLUTION INTRAMUSCULAR at 22:50

## 2022-05-24 RX ADMIN — LORAZEPAM 2 MG: 2 INJECTION INTRAMUSCULAR; INTRAVENOUS at 22:13

## 2022-05-24 RX ADMIN — LORAZEPAM 1 MG: 2 INJECTION INTRAMUSCULAR; INTRAVENOUS at 18:07

## 2022-05-25 ENCOUNTER — PATIENT OUTREACH (OUTPATIENT)
Dept: CASE MANAGEMENT | Facility: OTHER | Age: 57
End: 2022-05-25

## 2022-05-25 DIAGNOSIS — I10 HYPERTENSION, UNSPECIFIED TYPE: ICD-10-CM

## 2022-05-25 DIAGNOSIS — R18.8 CIRRHOSIS OF LIVER WITH ASCITES, UNSPECIFIED HEPATIC CIRRHOSIS TYPE: Primary | ICD-10-CM

## 2022-05-25 DIAGNOSIS — K74.60 CIRRHOSIS OF LIVER WITH ASCITES, UNSPECIFIED HEPATIC CIRRHOSIS TYPE: Primary | ICD-10-CM

## 2022-05-25 DIAGNOSIS — K76.82 HEPATIC ENCEPHALOPATHY: ICD-10-CM

## 2022-05-25 LAB
ALBUMIN SERPL-MCNC: 3.6 G/DL (ref 3.5–5.2)
ALBUMIN/GLOB SERPL: 1 G/DL
ALP SERPL-CCNC: 114 U/L (ref 39–117)
ALT SERPL W P-5'-P-CCNC: 28 U/L (ref 1–41)
ANION GAP SERPL CALCULATED.3IONS-SCNC: 18.4 MMOL/L (ref 5–15)
AST SERPL-CCNC: 52 U/L (ref 1–40)
BASOPHILS # BLD AUTO: 0.03 10*3/MM3 (ref 0–0.2)
BASOPHILS NFR BLD AUTO: 0.4 % (ref 0–1.5)
BILIRUB SERPL-MCNC: 3.3 MG/DL (ref 0–1.2)
BUN SERPL-MCNC: 46 MG/DL (ref 6–20)
BUN/CREAT SERPL: 16.2 (ref 7–25)
CALCIUM SPEC-SCNC: 11.2 MG/DL (ref 8.6–10.5)
CHLORIDE SERPL-SCNC: 110 MMOL/L (ref 98–107)
CO2 SERPL-SCNC: 14.6 MMOL/L (ref 22–29)
CREAT SERPL-MCNC: 2.84 MG/DL (ref 0.76–1.27)
D-LACTATE SERPL-SCNC: 2.1 MMOL/L (ref 0.5–2)
D-LACTATE SERPL-SCNC: 2.8 MMOL/L (ref 0.5–2)
D-LACTATE SERPL-SCNC: 4.8 MMOL/L (ref 0.5–2)
DEPRECATED RDW RBC AUTO: 49.4 FL (ref 37–54)
DEPRECATED RDW RBC AUTO: 50.4 FL (ref 37–54)
EGFRCR SERPLBLD CKD-EPI 2021: 25.2 ML/MIN/1.73
EOSINOPHIL # BLD AUTO: 0.04 10*3/MM3 (ref 0–0.4)
EOSINOPHIL NFR BLD AUTO: 0.5 % (ref 0.3–6.2)
ERYTHROCYTE [DISTWIDTH] IN BLOOD BY AUTOMATED COUNT: 14.6 % (ref 12.3–15.4)
ERYTHROCYTE [DISTWIDTH] IN BLOOD BY AUTOMATED COUNT: 14.6 % (ref 12.3–15.4)
GLOBULIN UR ELPH-MCNC: 3.5 GM/DL
GLUCOSE BLDC GLUCOMTR-MCNC: 222 MG/DL (ref 70–99)
GLUCOSE BLDC GLUCOMTR-MCNC: 223 MG/DL (ref 70–99)
GLUCOSE BLDC GLUCOMTR-MCNC: 261 MG/DL (ref 70–99)
GLUCOSE BLDC GLUCOMTR-MCNC: 273 MG/DL (ref 70–99)
GLUCOSE SERPL-MCNC: 279 MG/DL (ref 65–99)
HCT VFR BLD AUTO: 35.2 % (ref 37.5–51)
HCT VFR BLD AUTO: 35.3 % (ref 37.5–51)
HGB BLD-MCNC: 11.5 G/DL (ref 13–17.7)
HGB BLD-MCNC: 12 G/DL (ref 13–17.7)
IMM GRANULOCYTES # BLD AUTO: 0.02 10*3/MM3 (ref 0–0.05)
IMM GRANULOCYTES NFR BLD AUTO: 0.3 % (ref 0–0.5)
LYMPHOCYTES # BLD AUTO: 0.94 10*3/MM3 (ref 0.7–3.1)
LYMPHOCYTES NFR BLD AUTO: 12.7 % (ref 19.6–45.3)
MAGNESIUM SERPL-MCNC: 2.1 MG/DL (ref 1.6–2.6)
MCH RBC QN AUTO: 31.3 PG (ref 26.6–33)
MCH RBC QN AUTO: 31.9 PG (ref 26.6–33)
MCHC RBC AUTO-ENTMCNC: 32.7 G/DL (ref 31.5–35.7)
MCHC RBC AUTO-ENTMCNC: 34 G/DL (ref 31.5–35.7)
MCV RBC AUTO: 93.9 FL (ref 79–97)
MCV RBC AUTO: 95.9 FL (ref 79–97)
MONOCYTES # BLD AUTO: 0.93 10*3/MM3 (ref 0.1–0.9)
MONOCYTES NFR BLD AUTO: 12.5 % (ref 5–12)
NEUTROPHILS NFR BLD AUTO: 5.47 10*3/MM3 (ref 1.7–7)
NEUTROPHILS NFR BLD AUTO: 73.6 % (ref 42.7–76)
NRBC BLD AUTO-RTO: 0 /100 WBC (ref 0–0.2)
NT-PROBNP SERPL-MCNC: 568.8 PG/ML (ref 0–900)
PHOSPHATE SERPL-MCNC: 6.4 MG/DL (ref 2.5–4.5)
PLATELET # BLD AUTO: 80 10*3/MM3 (ref 140–450)
PLATELET # BLD AUTO: 84 10*3/MM3 (ref 140–450)
PMV BLD AUTO: 11.1 FL (ref 6–12)
PMV BLD AUTO: 11.5 FL (ref 6–12)
POTASSIUM SERPL-SCNC: 5.5 MMOL/L (ref 3.5–5.2)
PROCALCITONIN SERPL-MCNC: 1.39 NG/ML (ref 0–0.25)
PROT SERPL-MCNC: 7.1 G/DL (ref 6–8.5)
RBC # BLD AUTO: 3.67 10*6/MM3 (ref 4.14–5.8)
RBC # BLD AUTO: 3.76 10*6/MM3 (ref 4.14–5.8)
SODIUM SERPL-SCNC: 143 MMOL/L (ref 136–145)
WBC NRBC COR # BLD: 7.43 10*3/MM3 (ref 3.4–10.8)
WBC NRBC COR # BLD: 8.39 10*3/MM3 (ref 3.4–10.8)

## 2022-05-25 PROCEDURE — 84145 PROCALCITONIN (PCT): CPT | Performed by: INTERNAL MEDICINE

## 2022-05-25 PROCEDURE — 80053 COMPREHEN METABOLIC PANEL: CPT | Performed by: FAMILY MEDICINE

## 2022-05-25 PROCEDURE — 84100 ASSAY OF PHOSPHORUS: CPT | Performed by: NURSE PRACTITIONER

## 2022-05-25 PROCEDURE — 63710000001 INSULIN DETEMIR PER 5 UNITS: Performed by: INTERNAL MEDICINE

## 2022-05-25 PROCEDURE — 83605 ASSAY OF LACTIC ACID: CPT | Performed by: EMERGENCY MEDICINE

## 2022-05-25 PROCEDURE — 83880 ASSAY OF NATRIURETIC PEPTIDE: CPT | Performed by: INTERNAL MEDICINE

## 2022-05-25 PROCEDURE — 85027 COMPLETE CBC AUTOMATED: CPT | Performed by: FAMILY MEDICINE

## 2022-05-25 PROCEDURE — 63710000001 INSULIN LISPRO (HUMAN) PER 5 UNITS: Performed by: FAMILY MEDICINE

## 2022-05-25 PROCEDURE — 82962 GLUCOSE BLOOD TEST: CPT

## 2022-05-25 PROCEDURE — 85025 COMPLETE CBC W/AUTO DIFF WBC: CPT | Performed by: NURSE PRACTITIONER

## 2022-05-25 PROCEDURE — 83735 ASSAY OF MAGNESIUM: CPT | Performed by: NURSE PRACTITIONER

## 2022-05-25 PROCEDURE — 99233 SBSQ HOSP IP/OBS HIGH 50: CPT | Performed by: FAMILY MEDICINE

## 2022-05-25 PROCEDURE — 25010000002 CEFEPIME PER 500 MG: Performed by: INTERNAL MEDICINE

## 2022-05-25 PROCEDURE — 99291 CRITICAL CARE FIRST HOUR: CPT | Performed by: INTERNAL MEDICINE

## 2022-05-25 PROCEDURE — 25010000002 VANCOMYCIN 5 G RECONSTITUTED SOLUTION: Performed by: INTERNAL MEDICINE

## 2022-05-25 RX ORDER — MIDODRINE HYDROCHLORIDE 10 MG/1
10 TABLET ORAL EVERY 8 HOURS
Status: DISCONTINUED | OUTPATIENT
Start: 2022-05-25 | End: 2022-05-26

## 2022-05-25 RX ORDER — LACTULOSE 10 G/15ML
30 SOLUTION ORAL EVERY 6 HOURS
Status: DISCONTINUED | OUTPATIENT
Start: 2022-05-25 | End: 2022-05-25

## 2022-05-25 RX ORDER — FAMOTIDINE 10 MG/ML
20 INJECTION, SOLUTION INTRAVENOUS EVERY 12 HOURS SCHEDULED
Status: DISCONTINUED | OUTPATIENT
Start: 2022-05-25 | End: 2022-05-31

## 2022-05-25 RX ORDER — CEFEPIME 1 G/50ML
2 INJECTION, SOLUTION INTRAVENOUS ONCE
Status: COMPLETED | OUTPATIENT
Start: 2022-05-25 | End: 2022-05-25

## 2022-05-25 RX ORDER — LACTULOSE 10 G/15ML
30 SOLUTION ORAL EVERY 4 HOURS
Status: DISCONTINUED | OUTPATIENT
Start: 2022-05-25 | End: 2022-05-26

## 2022-05-25 RX ORDER — HALOPERIDOL 5 MG/ML
4 INJECTION INTRAMUSCULAR EVERY 4 HOURS PRN
Status: DISCONTINUED | OUTPATIENT
Start: 2022-05-25 | End: 2022-06-02

## 2022-05-25 RX ORDER — CEFEPIME 1 G/50ML
2 INJECTION, SOLUTION INTRAVENOUS EVERY 12 HOURS
Status: DISCONTINUED | OUTPATIENT
Start: 2022-05-25 | End: 2022-05-28

## 2022-05-25 RX ADMIN — CEFEPIME 2 G: 1 INJECTION, SOLUTION INTRAVENOUS at 19:43

## 2022-05-25 RX ADMIN — SODIUM ZIRCONIUM CYCLOSILICATE 10 G: 10 POWDER, FOR SUSPENSION ORAL at 16:10

## 2022-05-25 RX ADMIN — MIDODRINE HYDROCHLORIDE 10 MG: 10 TABLET ORAL at 13:05

## 2022-05-25 RX ADMIN — Medication 10 ML: at 20:07

## 2022-05-25 RX ADMIN — LACTULOSE 300 ML: 10 SOLUTION ORAL at 01:36

## 2022-05-25 RX ADMIN — LACTULOSE 30 G: 20 SOLUTION ORAL at 19:44

## 2022-05-25 RX ADMIN — SODIUM BICARBONATE 100 MEQ: 84 INJECTION INTRAVENOUS at 07:30

## 2022-05-25 RX ADMIN — INSULIN LISPRO 6 UNITS: 100 INJECTION, SOLUTION INTRAVENOUS; SUBCUTANEOUS at 13:05

## 2022-05-25 RX ADMIN — CEFEPIME 2 G: 1 INJECTION, SOLUTION INTRAVENOUS at 07:33

## 2022-05-25 RX ADMIN — LACTULOSE 30 G: 20 SOLUTION ORAL at 04:15

## 2022-05-25 RX ADMIN — LACTULOSE 30 G: 20 SOLUTION ORAL at 05:51

## 2022-05-25 RX ADMIN — LACTULOSE 30 G: 20 SOLUTION ORAL at 09:55

## 2022-05-25 RX ADMIN — LACTULOSE 30 G: 20 SOLUTION ORAL at 23:58

## 2022-05-25 RX ADMIN — MIDODRINE HYDROCHLORIDE 10 MG: 10 TABLET ORAL at 20:07

## 2022-05-25 RX ADMIN — Medication 10 ML: at 08:00

## 2022-05-25 RX ADMIN — VANCOMYCIN HYDROCHLORIDE 2250 MG: 5 INJECTION, POWDER, LYOPHILIZED, FOR SOLUTION INTRAVENOUS at 07:57

## 2022-05-25 RX ADMIN — RIFAXIMIN 550 MG: 550 TABLET ORAL at 09:56

## 2022-05-25 RX ADMIN — SODIUM ZIRCONIUM CYCLOSILICATE 10 G: 10 POWDER, FOR SUSPENSION ORAL at 09:56

## 2022-05-25 RX ADMIN — INSULIN LISPRO 6 UNITS: 100 INJECTION, SOLUTION INTRAVENOUS; SUBCUTANEOUS at 17:50

## 2022-05-25 RX ADMIN — INSULIN LISPRO 4 UNITS: 100 INJECTION, SOLUTION INTRAVENOUS; SUBCUTANEOUS at 05:51

## 2022-05-25 RX ADMIN — LACTULOSE 30 G: 20 SOLUTION ORAL at 16:10

## 2022-05-25 RX ADMIN — SODIUM ZIRCONIUM CYCLOSILICATE 10 G: 10 POWDER, FOR SUSPENSION ORAL at 20:07

## 2022-05-25 RX ADMIN — INSULIN LISPRO 2 UNITS: 100 INJECTION, SOLUTION INTRAVENOUS; SUBCUTANEOUS at 00:17

## 2022-05-25 RX ADMIN — FAMOTIDINE 20 MG: 10 INJECTION INTRAVENOUS at 20:07

## 2022-05-25 RX ADMIN — RIFAXIMIN 550 MG: 550 TABLET ORAL at 20:06

## 2022-05-25 RX ADMIN — SODIUM BICARBONATE 100 MEQ: 84 INJECTION INTRAVENOUS at 16:06

## 2022-05-25 RX ADMIN — RIFAXIMIN 550 MG: 550 TABLET ORAL at 00:02

## 2022-05-25 RX ADMIN — FAMOTIDINE 20 MG: 10 INJECTION INTRAVENOUS at 08:01

## 2022-05-25 RX ADMIN — Medication 10 ML: at 01:37

## 2022-05-25 RX ADMIN — LACTULOSE 30 G: 20 SOLUTION ORAL at 01:37

## 2022-05-25 RX ADMIN — INSULIN DETEMIR 15 UNITS: 100 INJECTION, SOLUTION SUBCUTANEOUS at 21:44

## 2022-05-25 RX ADMIN — DEXMEDETOMIDINE HYDROCHLORIDE 1 MCG/KG/HR: 400 INJECTION, SOLUTION INTRAVENOUS at 04:31

## 2022-05-25 NOTE — OUTREACH NOTE
AMBULATORY CASE MANAGEMENT NOTE    Name and Relationship of Patient/Support Person: Fidencio Nic Jackson - Self  Self    CCM Interim Update      Per chart review the patient is currently in CCU with complications from encephalopathy. Patient has NG feeding tube per chart:       Diet, Tube Feeding Tube Feeding Formula: Novasource Renal; Tube Feeding Type: Continuous; Continuous Tube Feeding Start Rate (mL/hr): 25; Then Advance Rate By (mL/hr): 25; Every __ Hours: 4; To Goal Rate of (mL/hr): 45    Multiple remarkable lab values ( please see lab results) PCP as well as MSW aware of admission .     Education Documentation  No documentation found.        KAYLA NARANJO  Ambulatory Case Management    5/25/2022, 10:59 EDT

## 2022-05-26 LAB
ALBUMIN SERPL-MCNC: 3.2 G/DL (ref 3.5–5.2)
ALBUMIN/GLOB SERPL: 1.1 G/DL
ALP SERPL-CCNC: 117 U/L (ref 39–117)
ALT SERPL W P-5'-P-CCNC: 27 U/L (ref 1–41)
ANION GAP SERPL CALCULATED.3IONS-SCNC: 17.4 MMOL/L (ref 5–15)
AST SERPL-CCNC: 57 U/L (ref 1–40)
BASOPHILS # BLD AUTO: 0.05 10*3/MM3 (ref 0–0.2)
BASOPHILS NFR BLD AUTO: 0.6 % (ref 0–1.5)
BILIRUB SERPL-MCNC: 1.5 MG/DL (ref 0–1.2)
BUN SERPL-MCNC: 58 MG/DL (ref 6–20)
BUN/CREAT SERPL: 19.4 (ref 7–25)
CALCIUM SPEC-SCNC: 9.5 MG/DL (ref 8.6–10.5)
CHLORIDE SERPL-SCNC: 102 MMOL/L (ref 98–107)
CO2 SERPL-SCNC: 20.6 MMOL/L (ref 22–29)
CREAT SERPL-MCNC: 2.99 MG/DL (ref 0.76–1.27)
D-LACTATE SERPL-SCNC: 2.2 MMOL/L (ref 0.5–2)
DEPRECATED RDW RBC AUTO: 51.4 FL (ref 37–54)
EGFRCR SERPLBLD CKD-EPI 2021: 23.7 ML/MIN/1.73
EOSINOPHIL # BLD AUTO: 0.22 10*3/MM3 (ref 0–0.4)
EOSINOPHIL NFR BLD AUTO: 2.8 % (ref 0.3–6.2)
ERYTHROCYTE [DISTWIDTH] IN BLOOD BY AUTOMATED COUNT: 14.6 % (ref 12.3–15.4)
GLOBULIN UR ELPH-MCNC: 2.9 GM/DL
GLUCOSE BLDC GLUCOMTR-MCNC: 220 MG/DL (ref 70–99)
GLUCOSE BLDC GLUCOMTR-MCNC: 222 MG/DL (ref 70–99)
GLUCOSE BLDC GLUCOMTR-MCNC: 222 MG/DL (ref 70–99)
GLUCOSE BLDC GLUCOMTR-MCNC: 263 MG/DL (ref 70–99)
GLUCOSE BLDC GLUCOMTR-MCNC: 275 MG/DL (ref 70–99)
GLUCOSE SERPL-MCNC: 260 MG/DL (ref 65–99)
HCT VFR BLD AUTO: 31.3 % (ref 37.5–51)
HGB BLD-MCNC: 10.6 G/DL (ref 13–17.7)
IMM GRANULOCYTES # BLD AUTO: 0.04 10*3/MM3 (ref 0–0.05)
IMM GRANULOCYTES NFR BLD AUTO: 0.5 % (ref 0–0.5)
LYMPHOCYTES # BLD AUTO: 1.25 10*3/MM3 (ref 0.7–3.1)
LYMPHOCYTES NFR BLD AUTO: 15.9 % (ref 19.6–45.3)
MAGNESIUM SERPL-MCNC: 1.9 MG/DL (ref 1.6–2.6)
MCH RBC QN AUTO: 32.4 PG (ref 26.6–33)
MCHC RBC AUTO-ENTMCNC: 33.9 G/DL (ref 31.5–35.7)
MCV RBC AUTO: 95.7 FL (ref 79–97)
MONOCYTES # BLD AUTO: 1.15 10*3/MM3 (ref 0.1–0.9)
MONOCYTES NFR BLD AUTO: 14.6 % (ref 5–12)
NEUTROPHILS NFR BLD AUTO: 5.14 10*3/MM3 (ref 1.7–7)
NEUTROPHILS NFR BLD AUTO: 65.6 % (ref 42.7–76)
NRBC BLD AUTO-RTO: 0 /100 WBC (ref 0–0.2)
PHOSPHATE SERPL-MCNC: 4.1 MG/DL (ref 2.5–4.5)
PLATELET # BLD AUTO: 78 10*3/MM3 (ref 140–450)
PMV BLD AUTO: 11.5 FL (ref 6–12)
POTASSIUM SERPL-SCNC: 3.4 MMOL/L (ref 3.5–5.2)
PROT SERPL-MCNC: 6.1 G/DL (ref 6–8.5)
RBC # BLD AUTO: 3.27 10*6/MM3 (ref 4.14–5.8)
SODIUM SERPL-SCNC: 140 MMOL/L (ref 136–145)
WBC NRBC COR # BLD: 7.85 10*3/MM3 (ref 3.4–10.8)

## 2022-05-26 PROCEDURE — 63710000001 INSULIN LISPRO (HUMAN) PER 5 UNITS: Performed by: FAMILY MEDICINE

## 2022-05-26 PROCEDURE — 82962 GLUCOSE BLOOD TEST: CPT

## 2022-05-26 PROCEDURE — 99233 SBSQ HOSP IP/OBS HIGH 50: CPT | Performed by: FAMILY MEDICINE

## 2022-05-26 PROCEDURE — 85025 COMPLETE CBC W/AUTO DIFF WBC: CPT | Performed by: INTERNAL MEDICINE

## 2022-05-26 PROCEDURE — 84100 ASSAY OF PHOSPHORUS: CPT | Performed by: NURSE PRACTITIONER

## 2022-05-26 PROCEDURE — 63710000001 INSULIN DETEMIR PER 5 UNITS: Performed by: INTERNAL MEDICINE

## 2022-05-26 PROCEDURE — 0 POTASSIUM CHLORIDE 10 MEQ/100ML SOLUTION: Performed by: NURSE PRACTITIONER

## 2022-05-26 PROCEDURE — 83735 ASSAY OF MAGNESIUM: CPT | Performed by: INTERNAL MEDICINE

## 2022-05-26 PROCEDURE — 83605 ASSAY OF LACTIC ACID: CPT | Performed by: INTERNAL MEDICINE

## 2022-05-26 PROCEDURE — 25010000002 CEFEPIME PER 500 MG: Performed by: INTERNAL MEDICINE

## 2022-05-26 PROCEDURE — 99291 CRITICAL CARE FIRST HOUR: CPT | Performed by: INTERNAL MEDICINE

## 2022-05-26 PROCEDURE — 80053 COMPREHEN METABOLIC PANEL: CPT | Performed by: INTERNAL MEDICINE

## 2022-05-26 PROCEDURE — 25010000002 LORAZEPAM PER 2 MG: Performed by: INTERNAL MEDICINE

## 2022-05-26 PROCEDURE — 25010000002 MAGNESIUM SULFATE IN D5W 1G/100ML (PREMIX) 1-5 GM/100ML-% SOLUTION: Performed by: NURSE PRACTITIONER

## 2022-05-26 RX ORDER — POTASSIUM CHLORIDE 29.8 MG/ML
20 INJECTION INTRAVENOUS ONCE
Status: DISCONTINUED | OUTPATIENT
Start: 2022-05-26 | End: 2022-05-26

## 2022-05-26 RX ORDER — POTASSIUM CHLORIDE 29.8 MG/ML
20 INJECTION INTRAVENOUS
Status: DISCONTINUED | OUTPATIENT
Start: 2022-05-26 | End: 2022-05-26

## 2022-05-26 RX ORDER — LORAZEPAM 2 MG/ML
1 INJECTION INTRAMUSCULAR EVERY 4 HOURS PRN
Status: DISCONTINUED | OUTPATIENT
Start: 2022-05-26 | End: 2022-05-31

## 2022-05-26 RX ORDER — POTASSIUM CHLORIDE 7.45 MG/ML
10 INJECTION INTRAVENOUS
Status: DISPENSED | OUTPATIENT
Start: 2022-05-26 | End: 2022-05-26

## 2022-05-26 RX ORDER — MAGNESIUM SULFATE 1 G/100ML
2 INJECTION INTRAVENOUS ONCE
Status: COMPLETED | OUTPATIENT
Start: 2022-05-26 | End: 2022-05-26

## 2022-05-26 RX ORDER — POTASSIUM CHLORIDE 7.45 MG/ML
10 INJECTION INTRAVENOUS ONCE
Status: COMPLETED | OUTPATIENT
Start: 2022-05-26 | End: 2022-05-26

## 2022-05-26 RX ORDER — LACTULOSE 10 G/15ML
30 SOLUTION ORAL EVERY 6 HOURS
Status: DISCONTINUED | OUTPATIENT
Start: 2022-05-27 | End: 2022-05-27

## 2022-05-26 RX ORDER — MIDODRINE HYDROCHLORIDE 2.5 MG/1
5 TABLET ORAL EVERY 8 HOURS
Status: DISCONTINUED | OUTPATIENT
Start: 2022-05-26 | End: 2022-05-26

## 2022-05-26 RX ADMIN — LORAZEPAM 1 MG: 2 INJECTION INTRAMUSCULAR; INTRAVENOUS at 08:44

## 2022-05-26 RX ADMIN — SODIUM BICARBONATE 100 MEQ: 84 INJECTION INTRAVENOUS at 00:23

## 2022-05-26 RX ADMIN — CEFEPIME 2 G: 1 INJECTION, SOLUTION INTRAVENOUS at 21:08

## 2022-05-26 RX ADMIN — LACTULOSE 30 G: 20 SOLUTION ORAL at 08:34

## 2022-05-26 RX ADMIN — LORAZEPAM 1 MG: 2 INJECTION INTRAMUSCULAR; INTRAVENOUS at 22:15

## 2022-05-26 RX ADMIN — SODIUM BICARBONATE 100 MEQ: 84 INJECTION INTRAVENOUS at 17:00

## 2022-05-26 RX ADMIN — INSULIN LISPRO 4 UNITS: 100 INJECTION, SOLUTION INTRAVENOUS; SUBCUTANEOUS at 05:37

## 2022-05-26 RX ADMIN — POTASSIUM CHLORIDE 10 MEQ: 7.46 INJECTION, SOLUTION INTRAVENOUS at 14:38

## 2022-05-26 RX ADMIN — SODIUM BICARBONATE 100 MEQ: 84 INJECTION INTRAVENOUS at 08:33

## 2022-05-26 RX ADMIN — RIFAXIMIN 550 MG: 550 TABLET ORAL at 21:08

## 2022-05-26 RX ADMIN — MAGNESIUM SULFATE 2 G: 1 INJECTION INTRAVENOUS at 09:49

## 2022-05-26 RX ADMIN — INSULIN LISPRO 6 UNITS: 100 INJECTION, SOLUTION INTRAVENOUS; SUBCUTANEOUS at 13:25

## 2022-05-26 RX ADMIN — LACTULOSE 30 G: 20 SOLUTION ORAL at 13:24

## 2022-05-26 RX ADMIN — RIFAXIMIN 550 MG: 550 TABLET ORAL at 08:35

## 2022-05-26 RX ADMIN — FAMOTIDINE 20 MG: 10 INJECTION INTRAVENOUS at 08:35

## 2022-05-26 RX ADMIN — LACTULOSE 30 G: 20 SOLUTION ORAL at 04:45

## 2022-05-26 RX ADMIN — INSULIN DETEMIR 20 UNITS: 100 INJECTION, SOLUTION SUBCUTANEOUS at 21:41

## 2022-05-26 RX ADMIN — POTASSIUM CHLORIDE 10 MEQ: 7.46 INJECTION, SOLUTION INTRAVENOUS at 09:49

## 2022-05-26 RX ADMIN — INSULIN LISPRO 6 UNITS: 100 INJECTION, SOLUTION INTRAVENOUS; SUBCUTANEOUS at 17:53

## 2022-05-26 RX ADMIN — FAMOTIDINE 20 MG: 10 INJECTION INTRAVENOUS at 21:08

## 2022-05-26 RX ADMIN — Medication 10 ML: at 21:08

## 2022-05-26 RX ADMIN — LORAZEPAM 1 MG: 2 INJECTION INTRAMUSCULAR; INTRAVENOUS at 15:07

## 2022-05-26 RX ADMIN — INSULIN LISPRO 4 UNITS: 100 INJECTION, SOLUTION INTRAVENOUS; SUBCUTANEOUS at 00:06

## 2022-05-26 RX ADMIN — POTASSIUM CHLORIDE 10 MEQ: 7.46 INJECTION, SOLUTION INTRAVENOUS at 15:42

## 2022-05-26 RX ADMIN — Medication 10 ML: at 08:45

## 2022-05-26 RX ADMIN — POTASSIUM CHLORIDE 10 MEQ: 7.46 INJECTION, SOLUTION INTRAVENOUS at 13:25

## 2022-05-26 RX ADMIN — LACTULOSE 30 G: 20 SOLUTION ORAL at 15:42

## 2022-05-26 RX ADMIN — CEFEPIME 2 G: 1 INJECTION, SOLUTION INTRAVENOUS at 08:35

## 2022-05-27 LAB
ALBUMIN SERPL-MCNC: 3.2 G/DL (ref 3.5–5.2)
ALBUMIN/GLOB SERPL: 1 G/DL
ALP SERPL-CCNC: 136 U/L (ref 39–117)
ALT SERPL W P-5'-P-CCNC: 36 U/L (ref 1–41)
ANION GAP SERPL CALCULATED.3IONS-SCNC: 13.6 MMOL/L (ref 5–15)
AST SERPL-CCNC: 105 U/L (ref 1–40)
BILIRUB SERPL-MCNC: 1.9 MG/DL (ref 0–1.2)
BUN SERPL-MCNC: 44 MG/DL (ref 6–20)
BUN/CREAT SERPL: 27.2 (ref 7–25)
CALCIUM SPEC-SCNC: 9.5 MG/DL (ref 8.6–10.5)
CHLORIDE SERPL-SCNC: 101 MMOL/L (ref 98–107)
CO2 SERPL-SCNC: 24.4 MMOL/L (ref 22–29)
CREAT SERPL-MCNC: 1.62 MG/DL (ref 0.76–1.27)
EGFRCR SERPLBLD CKD-EPI 2021: 49.5 ML/MIN/1.73
GLOBULIN UR ELPH-MCNC: 3.2 GM/DL
GLUCOSE BLDC GLUCOMTR-MCNC: 221 MG/DL (ref 70–99)
GLUCOSE BLDC GLUCOMTR-MCNC: 239 MG/DL (ref 70–99)
GLUCOSE BLDC GLUCOMTR-MCNC: 244 MG/DL (ref 70–99)
GLUCOSE BLDC GLUCOMTR-MCNC: 244 MG/DL (ref 70–99)
GLUCOSE BLDC GLUCOMTR-MCNC: 246 MG/DL (ref 70–99)
GLUCOSE SERPL-MCNC: 265 MG/DL (ref 65–99)
MAGNESIUM SERPL-MCNC: 2 MG/DL (ref 1.6–2.6)
PHOSPHATE SERPL-MCNC: 2.7 MG/DL (ref 2.5–4.5)
POTASSIUM SERPL-SCNC: 3.4 MMOL/L (ref 3.5–5.2)
PROT SERPL-MCNC: 6.4 G/DL (ref 6–8.5)
QT INTERVAL: 383 MS
SODIUM SERPL-SCNC: 139 MMOL/L (ref 136–145)

## 2022-05-27 PROCEDURE — 84100 ASSAY OF PHOSPHORUS: CPT | Performed by: NURSE PRACTITIONER

## 2022-05-27 PROCEDURE — 25010000002 CEFEPIME PER 500 MG: Performed by: INTERNAL MEDICINE

## 2022-05-27 PROCEDURE — 63710000001 INSULIN LISPRO (HUMAN) PER 5 UNITS: Performed by: FAMILY MEDICINE

## 2022-05-27 PROCEDURE — 82962 GLUCOSE BLOOD TEST: CPT

## 2022-05-27 PROCEDURE — 80053 COMPREHEN METABOLIC PANEL: CPT | Performed by: FAMILY MEDICINE

## 2022-05-27 PROCEDURE — 99291 CRITICAL CARE FIRST HOUR: CPT | Performed by: INTERNAL MEDICINE

## 2022-05-27 PROCEDURE — 0 POTASSIUM CHLORIDE 10 MEQ/100ML SOLUTION: Performed by: NURSE PRACTITIONER

## 2022-05-27 PROCEDURE — 83735 ASSAY OF MAGNESIUM: CPT | Performed by: NURSE PRACTITIONER

## 2022-05-27 PROCEDURE — 99233 SBSQ HOSP IP/OBS HIGH 50: CPT | Performed by: FAMILY MEDICINE

## 2022-05-27 PROCEDURE — 63710000001 INSULIN DETEMIR PER 5 UNITS: Performed by: FAMILY MEDICINE

## 2022-05-27 PROCEDURE — 25010000002 LORAZEPAM PER 2 MG: Performed by: INTERNAL MEDICINE

## 2022-05-27 RX ORDER — POTASSIUM CHLORIDE 1.5 G/1.77G
40 POWDER, FOR SOLUTION ORAL ONCE
Status: COMPLETED | OUTPATIENT
Start: 2022-05-27 | End: 2022-05-27

## 2022-05-27 RX ORDER — SERTRALINE HYDROCHLORIDE 25 MG/1
25 TABLET, FILM COATED ORAL DAILY
Status: DISCONTINUED | OUTPATIENT
Start: 2022-05-27 | End: 2022-05-27

## 2022-05-27 RX ORDER — BUSPIRONE HYDROCHLORIDE 7.5 MG/1
7.5 TABLET ORAL 3 TIMES DAILY
Status: DISCONTINUED | OUTPATIENT
Start: 2022-05-27 | End: 2022-05-27

## 2022-05-27 RX ORDER — SERTRALINE HYDROCHLORIDE 25 MG/1
25 TABLET, FILM COATED ORAL DAILY
Status: DISCONTINUED | OUTPATIENT
Start: 2022-05-28 | End: 2022-05-28

## 2022-05-27 RX ORDER — LACTULOSE 10 G/15ML
30 SOLUTION ORAL EVERY 8 HOURS
Status: DISCONTINUED | OUTPATIENT
Start: 2022-05-27 | End: 2022-05-28

## 2022-05-27 RX ORDER — POTASSIUM CHLORIDE 7.45 MG/ML
10 INJECTION INTRAVENOUS
Status: DISCONTINUED | OUTPATIENT
Start: 2022-05-27 | End: 2022-05-27

## 2022-05-27 RX ORDER — BUSPIRONE HYDROCHLORIDE 7.5 MG/1
7.5 TABLET ORAL 3 TIMES DAILY
Status: DISCONTINUED | OUTPATIENT
Start: 2022-05-27 | End: 2022-05-28

## 2022-05-27 RX ORDER — QUETIAPINE FUMARATE 25 MG/1
25 TABLET, FILM COATED ORAL EVERY 8 HOURS SCHEDULED
Status: DISCONTINUED | OUTPATIENT
Start: 2022-05-27 | End: 2022-05-28

## 2022-05-27 RX ORDER — QUETIAPINE FUMARATE 25 MG/1
25 TABLET, FILM COATED ORAL EVERY 8 HOURS SCHEDULED
Status: DISCONTINUED | OUTPATIENT
Start: 2022-05-27 | End: 2022-05-27

## 2022-05-27 RX ADMIN — QUETIAPINE FUMARATE 25 MG: 25 TABLET ORAL at 21:09

## 2022-05-27 RX ADMIN — POTASSIUM CHLORIDE 40 MEQ: 1.5 POWDER, FOR SOLUTION ORAL at 10:29

## 2022-05-27 RX ADMIN — INSULIN LISPRO 4 UNITS: 100 INJECTION, SOLUTION INTRAVENOUS; SUBCUTANEOUS at 05:48

## 2022-05-27 RX ADMIN — BUSPIRONE HYDROCHLORIDE 7.5 MG: 7.5 TABLET ORAL at 20:49

## 2022-05-27 RX ADMIN — LACTULOSE 30 G: 20 SOLUTION ORAL at 18:43

## 2022-05-27 RX ADMIN — SERTRALINE 25 MG: 25 TABLET, FILM COATED ORAL at 10:29

## 2022-05-27 RX ADMIN — INSULIN LISPRO 4 UNITS: 100 INJECTION, SOLUTION INTRAVENOUS; SUBCUTANEOUS at 12:10

## 2022-05-27 RX ADMIN — INSULIN LISPRO 4 UNITS: 100 INJECTION, SOLUTION INTRAVENOUS; SUBCUTANEOUS at 18:43

## 2022-05-27 RX ADMIN — Medication 10 ML: at 21:09

## 2022-05-27 RX ADMIN — FAMOTIDINE 20 MG: 10 INJECTION INTRAVENOUS at 08:12

## 2022-05-27 RX ADMIN — CEFEPIME 2 G: 1 INJECTION, SOLUTION INTRAVENOUS at 20:49

## 2022-05-27 RX ADMIN — QUETIAPINE FUMARATE 25 MG: 25 TABLET ORAL at 14:47

## 2022-05-27 RX ADMIN — BUSPIRONE HYDROCHLORIDE 7.5 MG: 7.5 TABLET ORAL at 10:29

## 2022-05-27 RX ADMIN — LACTULOSE 30 G: 20 SOLUTION ORAL at 00:51

## 2022-05-27 RX ADMIN — INSULIN DETEMIR 15 UNITS: 100 INJECTION, SOLUTION SUBCUTANEOUS at 20:49

## 2022-05-27 RX ADMIN — Medication 10 ML: at 08:13

## 2022-05-27 RX ADMIN — FAMOTIDINE 20 MG: 10 INJECTION INTRAVENOUS at 20:51

## 2022-05-27 RX ADMIN — RIFAXIMIN 550 MG: 550 TABLET ORAL at 08:13

## 2022-05-27 RX ADMIN — RIFAXIMIN 550 MG: 550 TABLET ORAL at 20:49

## 2022-05-27 RX ADMIN — LORAZEPAM 1 MG: 2 INJECTION INTRAMUSCULAR; INTRAVENOUS at 08:13

## 2022-05-27 RX ADMIN — INSULIN LISPRO 4 UNITS: 100 INJECTION, SOLUTION INTRAVENOUS; SUBCUTANEOUS at 00:51

## 2022-05-27 RX ADMIN — BUSPIRONE HYDROCHLORIDE 7.5 MG: 7.5 TABLET ORAL at 16:46

## 2022-05-27 RX ADMIN — CEFEPIME 2 G: 1 INJECTION, SOLUTION INTRAVENOUS at 08:13

## 2022-05-27 RX ADMIN — POTASSIUM CHLORIDE 10 MEQ: 7.46 INJECTION, SOLUTION INTRAVENOUS at 08:13

## 2022-05-27 RX ADMIN — POTASSIUM CHLORIDE 10 MEQ: 7.46 INJECTION, SOLUTION INTRAVENOUS at 07:06

## 2022-05-27 RX ADMIN — LACTULOSE 30 G: 20 SOLUTION ORAL at 08:11

## 2022-05-28 LAB
ALBUMIN SERPL-MCNC: 2.9 G/DL (ref 3.5–5.2)
ALBUMIN/GLOB SERPL: 1 G/DL
ALP SERPL-CCNC: 108 U/L (ref 39–117)
ALT SERPL W P-5'-P-CCNC: 39 U/L (ref 1–41)
ANION GAP SERPL CALCULATED.3IONS-SCNC: 9.2 MMOL/L (ref 5–15)
AST SERPL-CCNC: 102 U/L (ref 1–40)
BASOPHILS # BLD AUTO: 0.03 10*3/MM3 (ref 0–0.2)
BASOPHILS NFR BLD AUTO: 0.8 % (ref 0–1.5)
BILIRUB SERPL-MCNC: 2.1 MG/DL (ref 0–1.2)
BUN SERPL-MCNC: 34 MG/DL (ref 6–20)
BUN/CREAT SERPL: 29.1 (ref 7–25)
CALCIUM SPEC-SCNC: 9.5 MG/DL (ref 8.6–10.5)
CHLORIDE SERPL-SCNC: 106 MMOL/L (ref 98–107)
CO2 SERPL-SCNC: 26.8 MMOL/L (ref 22–29)
CREAT SERPL-MCNC: 1.17 MG/DL (ref 0.76–1.27)
DEPRECATED RDW RBC AUTO: 49.9 FL (ref 37–54)
EGFRCR SERPLBLD CKD-EPI 2021: 73.2 ML/MIN/1.73
EOSINOPHIL # BLD AUTO: 0.18 10*3/MM3 (ref 0–0.4)
EOSINOPHIL NFR BLD AUTO: 4.8 % (ref 0.3–6.2)
ERYTHROCYTE [DISTWIDTH] IN BLOOD BY AUTOMATED COUNT: 14.6 % (ref 12.3–15.4)
GLOBULIN UR ELPH-MCNC: 3 GM/DL
GLUCOSE BLDC GLUCOMTR-MCNC: 153 MG/DL (ref 70–99)
GLUCOSE BLDC GLUCOMTR-MCNC: 202 MG/DL (ref 70–99)
GLUCOSE BLDC GLUCOMTR-MCNC: 209 MG/DL (ref 70–99)
GLUCOSE BLDC GLUCOMTR-MCNC: 258 MG/DL (ref 70–99)
GLUCOSE BLDC GLUCOMTR-MCNC: 99 MG/DL (ref 70–99)
GLUCOSE SERPL-MCNC: 210 MG/DL (ref 65–99)
HCT VFR BLD AUTO: 29 % (ref 37.5–51)
HGB BLD-MCNC: 9.9 G/DL (ref 13–17.7)
IMM GRANULOCYTES # BLD AUTO: 0 10*3/MM3 (ref 0–0.05)
IMM GRANULOCYTES NFR BLD AUTO: 0 % (ref 0–0.5)
INR PPP: 1.98 (ref 0.86–1.15)
LYMPHOCYTES # BLD AUTO: 0.78 10*3/MM3 (ref 0.7–3.1)
LYMPHOCYTES NFR BLD AUTO: 20.9 % (ref 19.6–45.3)
MAGNESIUM SERPL-MCNC: 2.2 MG/DL (ref 1.6–2.6)
MCH RBC QN AUTO: 32.7 PG (ref 26.6–33)
MCHC RBC AUTO-ENTMCNC: 34.1 G/DL (ref 31.5–35.7)
MCV RBC AUTO: 95.7 FL (ref 79–97)
MONOCYTES # BLD AUTO: 0.75 10*3/MM3 (ref 0.1–0.9)
MONOCYTES NFR BLD AUTO: 20.1 % (ref 5–12)
NEUTROPHILS NFR BLD AUTO: 2 10*3/MM3 (ref 1.7–7)
NEUTROPHILS NFR BLD AUTO: 53.4 % (ref 42.7–76)
NRBC BLD AUTO-RTO: 0 /100 WBC (ref 0–0.2)
PHOSPHATE SERPL-MCNC: 2.6 MG/DL (ref 2.5–4.5)
PLAT MORPH BLD: NORMAL
PLATELET # BLD AUTO: 60 10*3/MM3 (ref 140–450)
PMV BLD AUTO: 11.8 FL (ref 6–12)
POTASSIUM SERPL-SCNC: 3.7 MMOL/L (ref 3.5–5.2)
PROT SERPL-MCNC: 5.9 G/DL (ref 6–8.5)
PROTHROMBIN TIME: 22.9 SECONDS (ref 11.8–14.9)
RBC # BLD AUTO: 3.03 10*6/MM3 (ref 4.14–5.8)
RBC MORPH BLD: NORMAL
SODIUM SERPL-SCNC: 142 MMOL/L (ref 136–145)
WBC MORPH BLD: NORMAL
WBC NRBC COR # BLD: 3.74 10*3/MM3 (ref 3.4–10.8)

## 2022-05-28 PROCEDURE — 85007 BL SMEAR W/DIFF WBC COUNT: CPT | Performed by: INTERNAL MEDICINE

## 2022-05-28 PROCEDURE — 25010000002 CEFEPIME PER 500 MG: Performed by: FAMILY MEDICINE

## 2022-05-28 PROCEDURE — 82962 GLUCOSE BLOOD TEST: CPT

## 2022-05-28 PROCEDURE — 25010000002 ONDANSETRON PER 1 MG: Performed by: HOSPITALIST

## 2022-05-28 PROCEDURE — 80053 COMPREHEN METABOLIC PANEL: CPT | Performed by: INTERNAL MEDICINE

## 2022-05-28 PROCEDURE — 99291 CRITICAL CARE FIRST HOUR: CPT | Performed by: INTERNAL MEDICINE

## 2022-05-28 PROCEDURE — 63710000001 INSULIN DETEMIR PER 5 UNITS: Performed by: FAMILY MEDICINE

## 2022-05-28 PROCEDURE — 63710000001 INSULIN LISPRO (HUMAN) PER 5 UNITS: Performed by: FAMILY MEDICINE

## 2022-05-28 PROCEDURE — 83735 ASSAY OF MAGNESIUM: CPT | Performed by: INTERNAL MEDICINE

## 2022-05-28 PROCEDURE — 85025 COMPLETE CBC W/AUTO DIFF WBC: CPT | Performed by: INTERNAL MEDICINE

## 2022-05-28 PROCEDURE — 84100 ASSAY OF PHOSPHORUS: CPT | Performed by: INTERNAL MEDICINE

## 2022-05-28 PROCEDURE — 25010000002 CEFEPIME PER 500 MG: Performed by: INTERNAL MEDICINE

## 2022-05-28 PROCEDURE — 85610 PROTHROMBIN TIME: CPT | Performed by: NURSE PRACTITIONER

## 2022-05-28 PROCEDURE — 99233 SBSQ HOSP IP/OBS HIGH 50: CPT | Performed by: FAMILY MEDICINE

## 2022-05-28 RX ORDER — BUSPIRONE HYDROCHLORIDE 7.5 MG/1
7.5 TABLET ORAL 3 TIMES DAILY
Status: DISCONTINUED | OUTPATIENT
Start: 2022-05-28 | End: 2022-06-01

## 2022-05-28 RX ORDER — METHION/INOS/CHOL BT/B COM/LIV 110MG-86MG
100 CAPSULE ORAL DAILY
Status: DISCONTINUED | OUTPATIENT
Start: 2022-05-28 | End: 2022-05-28

## 2022-05-28 RX ORDER — CEFEPIME 1 G/50ML
2 INJECTION, SOLUTION INTRAVENOUS EVERY 8 HOURS
Status: COMPLETED | OUTPATIENT
Start: 2022-05-28 | End: 2022-06-01

## 2022-05-28 RX ORDER — FOLIC ACID 1 MG/1
1 TABLET ORAL DAILY
Status: DISCONTINUED | OUTPATIENT
Start: 2022-05-28 | End: 2022-05-28

## 2022-05-28 RX ORDER — POTASSIUM CHLORIDE 1.5 G/1.77G
40 POWDER, FOR SOLUTION ORAL ONCE
Status: COMPLETED | OUTPATIENT
Start: 2022-05-28 | End: 2022-05-28

## 2022-05-28 RX ORDER — FOLIC ACID 1 MG/1
1 TABLET ORAL DAILY
Status: DISCONTINUED | OUTPATIENT
Start: 2022-05-29 | End: 2022-06-17 | Stop reason: HOSPADM

## 2022-05-28 RX ORDER — ONDANSETRON 2 MG/ML
4 INJECTION INTRAMUSCULAR; INTRAVENOUS EVERY 6 HOURS PRN
Status: DISCONTINUED | OUTPATIENT
Start: 2022-05-28 | End: 2022-06-17 | Stop reason: HOSPADM

## 2022-05-28 RX ORDER — INSULIN LISPRO 100 [IU]/ML
0-9 INJECTION, SOLUTION INTRAVENOUS; SUBCUTANEOUS
Status: DISCONTINUED | OUTPATIENT
Start: 2022-05-29 | End: 2022-05-29

## 2022-05-28 RX ORDER — LACTULOSE 10 G/15ML
30 SOLUTION ORAL EVERY 8 HOURS
Status: DISCONTINUED | OUTPATIENT
Start: 2022-05-28 | End: 2022-05-28

## 2022-05-28 RX ORDER — OXYCODONE HYDROCHLORIDE 5 MG/1
5 TABLET ORAL EVERY 6 HOURS PRN
Status: DISCONTINUED | OUTPATIENT
Start: 2022-05-28 | End: 2022-05-28

## 2022-05-28 RX ORDER — SERTRALINE HYDROCHLORIDE 25 MG/1
25 TABLET, FILM COATED ORAL DAILY
Status: DISCONTINUED | OUTPATIENT
Start: 2022-05-29 | End: 2022-06-01

## 2022-05-28 RX ORDER — QUETIAPINE FUMARATE 25 MG/1
25 TABLET, FILM COATED ORAL EVERY 8 HOURS SCHEDULED
Status: DISCONTINUED | OUTPATIENT
Start: 2022-05-28 | End: 2022-06-01

## 2022-05-28 RX ORDER — OXYCODONE HYDROCHLORIDE 5 MG/1
5 TABLET ORAL EVERY 6 HOURS PRN
Status: DISCONTINUED | OUTPATIENT
Start: 2022-05-28 | End: 2022-05-29

## 2022-05-28 RX ORDER — LACTULOSE 10 G/15ML
30 SOLUTION ORAL EVERY 8 HOURS
Status: DISCONTINUED | OUTPATIENT
Start: 2022-05-28 | End: 2022-05-30

## 2022-05-28 RX ADMIN — CEFEPIME 2 G: 1 INJECTION, SOLUTION INTRAVENOUS at 15:40

## 2022-05-28 RX ADMIN — LACTULOSE 30 G: 20 SOLUTION ORAL at 08:51

## 2022-05-28 RX ADMIN — CEFEPIME 2 G: 1 INJECTION, SOLUTION INTRAVENOUS at 08:51

## 2022-05-28 RX ADMIN — LACTULOSE 30 G: 20 SOLUTION ORAL at 02:15

## 2022-05-28 RX ADMIN — SERTRALINE 25 MG: 25 TABLET, FILM COATED ORAL at 08:52

## 2022-05-28 RX ADMIN — Medication 100 MG: at 11:18

## 2022-05-28 RX ADMIN — Medication 10 ML: at 00:49

## 2022-05-28 RX ADMIN — FAMOTIDINE 20 MG: 10 INJECTION INTRAVENOUS at 08:54

## 2022-05-28 RX ADMIN — CEFEPIME 2 G: 1 INJECTION, SOLUTION INTRAVENOUS at 23:48

## 2022-05-28 RX ADMIN — OXYCODONE HYDROCHLORIDE 5 MG: 5 TABLET ORAL at 16:46

## 2022-05-28 RX ADMIN — INSULIN DETEMIR 20 UNITS: 100 INJECTION, SOLUTION SUBCUTANEOUS at 21:20

## 2022-05-28 RX ADMIN — QUETIAPINE FUMARATE 25 MG: 25 TABLET ORAL at 21:21

## 2022-05-28 RX ADMIN — ONDANSETRON 4 MG: 2 INJECTION INTRAMUSCULAR; INTRAVENOUS at 20:06

## 2022-05-28 RX ADMIN — QUETIAPINE FUMARATE 25 MG: 25 TABLET ORAL at 15:40

## 2022-05-28 RX ADMIN — QUETIAPINE FUMARATE 25 MG: 25 TABLET ORAL at 06:31

## 2022-05-28 RX ADMIN — Medication 10 ML: at 21:19

## 2022-05-28 RX ADMIN — FAMOTIDINE 20 MG: 10 INJECTION INTRAVENOUS at 21:20

## 2022-05-28 RX ADMIN — INSULIN LISPRO 4 UNITS: 100 INJECTION, SOLUTION INTRAVENOUS; SUBCUTANEOUS at 05:32

## 2022-05-28 RX ADMIN — BUSPIRONE HYDROCHLORIDE 7.5 MG: 7.5 TABLET ORAL at 08:52

## 2022-05-28 RX ADMIN — INSULIN DETEMIR 15 UNITS: 100 INJECTION, SOLUTION SUBCUTANEOUS at 08:52

## 2022-05-28 RX ADMIN — INSULIN LISPRO 6 UNITS: 100 INJECTION, SOLUTION INTRAVENOUS; SUBCUTANEOUS at 00:55

## 2022-05-28 RX ADMIN — RIFAXIMIN 550 MG: 550 TABLET ORAL at 21:20

## 2022-05-28 RX ADMIN — FOLIC ACID 1 MG: 1 TABLET ORAL at 11:18

## 2022-05-28 RX ADMIN — INSULIN LISPRO 4 UNITS: 100 INJECTION, SOLUTION INTRAVENOUS; SUBCUTANEOUS at 11:18

## 2022-05-28 RX ADMIN — LACTULOSE 30 G: 20 SOLUTION ORAL at 18:03

## 2022-05-28 RX ADMIN — QUETIAPINE FUMARATE 25 MG: 25 TABLET ORAL at 08:52

## 2022-05-28 RX ADMIN — BUSPIRONE HYDROCHLORIDE 7.5 MG: 7.5 TABLET ORAL at 21:20

## 2022-05-28 RX ADMIN — RIFAXIMIN 550 MG: 550 TABLET ORAL at 08:52

## 2022-05-28 RX ADMIN — Medication 10 ML: at 08:53

## 2022-05-28 RX ADMIN — BUSPIRONE HYDROCHLORIDE 7.5 MG: 7.5 TABLET ORAL at 15:40

## 2022-05-28 RX ADMIN — POTASSIUM CHLORIDE 40 MEQ: 1.5 POWDER, FOR SOLUTION ORAL at 06:53

## 2022-05-28 RX ADMIN — OXYCODONE HYDROCHLORIDE 5 MG: 5 TABLET ORAL at 23:37

## 2022-05-29 ENCOUNTER — APPOINTMENT (OUTPATIENT)
Dept: GENERAL RADIOLOGY | Facility: HOSPITAL | Age: 57
End: 2022-05-29

## 2022-05-29 LAB
ALBUMIN SERPL-MCNC: 3.1 G/DL (ref 3.5–5.2)
ALBUMIN/GLOB SERPL: 1.1 G/DL
ALP SERPL-CCNC: 88 U/L (ref 39–117)
ALT SERPL W P-5'-P-CCNC: 33 U/L (ref 1–41)
ANION GAP SERPL CALCULATED.3IONS-SCNC: 10 MMOL/L (ref 5–15)
AST SERPL-CCNC: 72 U/L (ref 1–40)
BACTERIA SPEC AEROBE CULT: NORMAL
BACTERIA SPEC AEROBE CULT: NORMAL
BASOPHILS # BLD AUTO: 0.03 10*3/MM3 (ref 0–0.2)
BASOPHILS NFR BLD AUTO: 1 % (ref 0–1.5)
BILIRUB SERPL-MCNC: 2.2 MG/DL (ref 0–1.2)
BUN SERPL-MCNC: 27 MG/DL (ref 6–20)
BUN/CREAT SERPL: 28.7 (ref 7–25)
CALCIUM SPEC-SCNC: 9.2 MG/DL (ref 8.6–10.5)
CHLORIDE SERPL-SCNC: 103 MMOL/L (ref 98–107)
CO2 SERPL-SCNC: 23 MMOL/L (ref 22–29)
CREAT SERPL-MCNC: 0.94 MG/DL (ref 0.76–1.27)
DEPRECATED RDW RBC AUTO: 49.7 FL (ref 37–54)
EGFRCR SERPLBLD CKD-EPI 2021: 95.1 ML/MIN/1.73
EOSINOPHIL # BLD AUTO: 0.2 10*3/MM3 (ref 0–0.4)
EOSINOPHIL NFR BLD AUTO: 6.9 % (ref 0.3–6.2)
ERYTHROCYTE [DISTWIDTH] IN BLOOD BY AUTOMATED COUNT: 14.5 % (ref 12.3–15.4)
GLOBULIN UR ELPH-MCNC: 2.9 GM/DL
GLUCOSE BLDC GLUCOMTR-MCNC: 101 MG/DL (ref 70–99)
GLUCOSE BLDC GLUCOMTR-MCNC: 119 MG/DL (ref 70–99)
GLUCOSE BLDC GLUCOMTR-MCNC: 154 MG/DL (ref 70–99)
GLUCOSE BLDC GLUCOMTR-MCNC: 206 MG/DL (ref 70–99)
GLUCOSE BLDC GLUCOMTR-MCNC: 49 MG/DL (ref 70–99)
GLUCOSE BLDC GLUCOMTR-MCNC: 86 MG/DL (ref 70–99)
GLUCOSE SERPL-MCNC: 137 MG/DL (ref 65–99)
HCT VFR BLD AUTO: 28.9 % (ref 37.5–51)
HGB BLD-MCNC: 9.7 G/DL (ref 13–17.7)
IMM GRANULOCYTES # BLD AUTO: 0.01 10*3/MM3 (ref 0–0.05)
IMM GRANULOCYTES NFR BLD AUTO: 0.3 % (ref 0–0.5)
LYMPHOCYTES # BLD AUTO: 0.82 10*3/MM3 (ref 0.7–3.1)
LYMPHOCYTES NFR BLD AUTO: 28.3 % (ref 19.6–45.3)
MAGNESIUM SERPL-MCNC: 1.9 MG/DL (ref 1.6–2.6)
MCH RBC QN AUTO: 32.4 PG (ref 26.6–33)
MCHC RBC AUTO-ENTMCNC: 33.6 G/DL (ref 31.5–35.7)
MCV RBC AUTO: 96.7 FL (ref 79–97)
MONOCYTES # BLD AUTO: 0.54 10*3/MM3 (ref 0.1–0.9)
MONOCYTES NFR BLD AUTO: 18.6 % (ref 5–12)
NEUTROPHILS NFR BLD AUTO: 1.3 10*3/MM3 (ref 1.7–7)
NEUTROPHILS NFR BLD AUTO: 44.9 % (ref 42.7–76)
NRBC BLD AUTO-RTO: 0 /100 WBC (ref 0–0.2)
PLATELET # BLD AUTO: 56 10*3/MM3 (ref 140–450)
PMV BLD AUTO: 11.4 FL (ref 6–12)
POTASSIUM SERPL-SCNC: 3.8 MMOL/L (ref 3.5–5.2)
PROT SERPL-MCNC: 6 G/DL (ref 6–8.5)
RBC # BLD AUTO: 2.99 10*6/MM3 (ref 4.14–5.8)
SODIUM SERPL-SCNC: 136 MMOL/L (ref 136–145)
WBC NRBC COR # BLD: 2.9 10*3/MM3 (ref 3.4–10.8)

## 2022-05-29 PROCEDURE — 99232 SBSQ HOSP IP/OBS MODERATE 35: CPT | Performed by: INTERNAL MEDICINE

## 2022-05-29 PROCEDURE — 63710000001 INSULIN DETEMIR PER 5 UNITS: Performed by: FAMILY MEDICINE

## 2022-05-29 PROCEDURE — 25010000002 LORAZEPAM PER 2 MG: Performed by: INTERNAL MEDICINE

## 2022-05-29 PROCEDURE — 99233 SBSQ HOSP IP/OBS HIGH 50: CPT | Performed by: FAMILY MEDICINE

## 2022-05-29 PROCEDURE — 25010000002 CEFEPIME PER 500 MG: Performed by: FAMILY MEDICINE

## 2022-05-29 PROCEDURE — 80053 COMPREHEN METABOLIC PANEL: CPT | Performed by: INTERNAL MEDICINE

## 2022-05-29 PROCEDURE — 83735 ASSAY OF MAGNESIUM: CPT | Performed by: INTERNAL MEDICINE

## 2022-05-29 PROCEDURE — 82962 GLUCOSE BLOOD TEST: CPT

## 2022-05-29 PROCEDURE — 85025 COMPLETE CBC W/AUTO DIFF WBC: CPT | Performed by: INTERNAL MEDICINE

## 2022-05-29 PROCEDURE — 94799 UNLISTED PULMONARY SVC/PX: CPT

## 2022-05-29 RX ORDER — INSULIN LISPRO 100 [IU]/ML
0-9 INJECTION, SOLUTION INTRAVENOUS; SUBCUTANEOUS
Status: DISCONTINUED | OUTPATIENT
Start: 2022-05-29 | End: 2022-05-30

## 2022-05-29 RX ORDER — POTASSIUM CHLORIDE 750 MG/1
20 CAPSULE, EXTENDED RELEASE ORAL
Status: COMPLETED | OUTPATIENT
Start: 2022-05-29 | End: 2022-05-29

## 2022-05-29 RX ORDER — ALBUTEROL SULFATE 2.5 MG/3ML
2.5 SOLUTION RESPIRATORY (INHALATION)
Status: DISCONTINUED | OUTPATIENT
Start: 2022-05-29 | End: 2022-06-17 | Stop reason: HOSPADM

## 2022-05-29 RX ORDER — SIMETHICONE 80 MG
80 TABLET,CHEWABLE ORAL 4 TIMES DAILY PRN
Status: DISCONTINUED | OUTPATIENT
Start: 2022-05-29 | End: 2022-06-17 | Stop reason: HOSPADM

## 2022-05-29 RX ORDER — OXYCODONE HYDROCHLORIDE 5 MG/1
5 TABLET ORAL EVERY 4 HOURS PRN
Status: DISCONTINUED | OUTPATIENT
Start: 2022-05-29 | End: 2022-06-08

## 2022-05-29 RX ORDER — FLUTICASONE PROPIONATE 50 MCG
2 SPRAY, SUSPENSION (ML) NASAL DAILY
Status: DISCONTINUED | OUTPATIENT
Start: 2022-05-29 | End: 2022-06-17 | Stop reason: HOSPADM

## 2022-05-29 RX ORDER — BUDESONIDE 0.5 MG/2ML
0.5 INHALANT ORAL
Status: DISCONTINUED | OUTPATIENT
Start: 2022-05-29 | End: 2022-06-17 | Stop reason: HOSPADM

## 2022-05-29 RX ADMIN — FLUTICASONE PROPIONATE 2 SPRAY: 50 SPRAY, METERED NASAL at 14:53

## 2022-05-29 RX ADMIN — LACTULOSE 30 G: 20 SOLUTION ORAL at 01:36

## 2022-05-29 RX ADMIN — QUETIAPINE FUMARATE 25 MG: 25 TABLET ORAL at 22:06

## 2022-05-29 RX ADMIN — RIFAXIMIN 550 MG: 550 TABLET ORAL at 22:06

## 2022-05-29 RX ADMIN — QUETIAPINE FUMARATE 25 MG: 25 TABLET ORAL at 05:32

## 2022-05-29 RX ADMIN — CEFEPIME 2 G: 1 INJECTION, SOLUTION INTRAVENOUS at 08:36

## 2022-05-29 RX ADMIN — CEFEPIME 2 G: 1 INJECTION, SOLUTION INTRAVENOUS at 23:09

## 2022-05-29 RX ADMIN — LACTULOSE 30 G: 20 SOLUTION ORAL at 10:37

## 2022-05-29 RX ADMIN — OXYCODONE HYDROCHLORIDE 5 MG: 5 TABLET ORAL at 14:53

## 2022-05-29 RX ADMIN — FAMOTIDINE 20 MG: 10 INJECTION INTRAVENOUS at 22:06

## 2022-05-29 RX ADMIN — RIFAXIMIN 550 MG: 550 TABLET ORAL at 08:38

## 2022-05-29 RX ADMIN — INSULIN DETEMIR 20 UNITS: 100 INJECTION, SOLUTION SUBCUTANEOUS at 09:52

## 2022-05-29 RX ADMIN — OXYCODONE HYDROCHLORIDE 5 MG: 5 TABLET ORAL at 05:32

## 2022-05-29 RX ADMIN — POTASSIUM CHLORIDE 20 MEQ: 10 CAPSULE, COATED, EXTENDED RELEASE ORAL at 18:32

## 2022-05-29 RX ADMIN — FOLIC ACID 1 MG: 1 TABLET ORAL at 09:47

## 2022-05-29 RX ADMIN — Medication 10 ML: at 22:06

## 2022-05-29 RX ADMIN — BUSPIRONE HYDROCHLORIDE 7.5 MG: 7.5 TABLET ORAL at 15:17

## 2022-05-29 RX ADMIN — SIMETHICONE 80 MG: 80 TABLET, CHEWABLE ORAL at 14:54

## 2022-05-29 RX ADMIN — Medication 100 MG: at 08:38

## 2022-05-29 RX ADMIN — Medication 10 ML: at 08:36

## 2022-05-29 RX ADMIN — OXYCODONE HYDROCHLORIDE 5 MG: 5 TABLET ORAL at 10:37

## 2022-05-29 RX ADMIN — LACTULOSE 30 G: 20 SOLUTION ORAL at 18:32

## 2022-05-29 RX ADMIN — LORAZEPAM 1 MG: 2 INJECTION INTRAMUSCULAR; INTRAVENOUS at 19:01

## 2022-05-29 RX ADMIN — SERTRALINE 25 MG: 25 TABLET, FILM COATED ORAL at 08:38

## 2022-05-29 RX ADMIN — FAMOTIDINE 20 MG: 10 INJECTION INTRAVENOUS at 08:36

## 2022-05-29 RX ADMIN — BUSPIRONE HYDROCHLORIDE 7.5 MG: 7.5 TABLET ORAL at 22:06

## 2022-05-29 RX ADMIN — CEFEPIME 2 G: 1 INJECTION, SOLUTION INTRAVENOUS at 15:18

## 2022-05-29 RX ADMIN — INSULIN DETEMIR 15 UNITS: 100 INJECTION, SOLUTION SUBCUTANEOUS at 22:05

## 2022-05-29 RX ADMIN — QUETIAPINE FUMARATE 25 MG: 25 TABLET ORAL at 13:10

## 2022-05-29 RX ADMIN — BUSPIRONE HYDROCHLORIDE 7.5 MG: 7.5 TABLET ORAL at 08:38

## 2022-05-30 LAB
ALBUMIN SERPL-MCNC: 2.7 G/DL (ref 3.5–5.2)
ALBUMIN/GLOB SERPL: 0.9 G/DL
ALP SERPL-CCNC: 81 U/L (ref 39–117)
ALT SERPL W P-5'-P-CCNC: 27 U/L (ref 1–41)
ANION GAP SERPL CALCULATED.3IONS-SCNC: 6.1 MMOL/L (ref 5–15)
AST SERPL-CCNC: 55 U/L (ref 1–40)
BILIRUB SERPL-MCNC: 1.9 MG/DL (ref 0–1.2)
BUN SERPL-MCNC: 26 MG/DL (ref 6–20)
BUN/CREAT SERPL: 32.5 (ref 7–25)
CALCIUM SPEC-SCNC: 8.6 MG/DL (ref 8.6–10.5)
CHLORIDE SERPL-SCNC: 106 MMOL/L (ref 98–107)
CO2 SERPL-SCNC: 23.9 MMOL/L (ref 22–29)
CREAT SERPL-MCNC: 0.8 MG/DL (ref 0.76–1.27)
EGFRCR SERPLBLD CKD-EPI 2021: 103.9 ML/MIN/1.73
GLOBULIN UR ELPH-MCNC: 2.9 GM/DL
GLUCOSE BLDC GLUCOMTR-MCNC: 109 MG/DL (ref 70–99)
GLUCOSE BLDC GLUCOMTR-MCNC: 110 MG/DL (ref 70–99)
GLUCOSE BLDC GLUCOMTR-MCNC: 121 MG/DL (ref 70–99)
GLUCOSE BLDC GLUCOMTR-MCNC: 232 MG/DL (ref 70–99)
GLUCOSE BLDC GLUCOMTR-MCNC: 49 MG/DL (ref 70–99)
GLUCOSE SERPL-MCNC: 62 MG/DL (ref 65–99)
POTASSIUM SERPL-SCNC: 4 MMOL/L (ref 3.5–5.2)
PROT SERPL-MCNC: 5.6 G/DL (ref 6–8.5)
SODIUM SERPL-SCNC: 136 MMOL/L (ref 136–145)
WHOLE BLOOD HOLD SPECIMEN: NORMAL

## 2022-05-30 PROCEDURE — 25010000002 CEFEPIME PER 500 MG: Performed by: FAMILY MEDICINE

## 2022-05-30 PROCEDURE — 80053 COMPREHEN METABOLIC PANEL: CPT | Performed by: FAMILY MEDICINE

## 2022-05-30 PROCEDURE — 99233 SBSQ HOSP IP/OBS HIGH 50: CPT | Performed by: FAMILY MEDICINE

## 2022-05-30 PROCEDURE — 25010000002 LORAZEPAM PER 2 MG: Performed by: INTERNAL MEDICINE

## 2022-05-30 PROCEDURE — 94640 AIRWAY INHALATION TREATMENT: CPT

## 2022-05-30 PROCEDURE — 94799 UNLISTED PULMONARY SVC/PX: CPT

## 2022-05-30 PROCEDURE — 82962 GLUCOSE BLOOD TEST: CPT

## 2022-05-30 RX ORDER — LACTULOSE 10 G/15ML
30 SOLUTION ORAL
Status: DISCONTINUED | OUTPATIENT
Start: 2022-05-30 | End: 2022-06-08

## 2022-05-30 RX ORDER — INSULIN LISPRO 100 [IU]/ML
0-9 INJECTION, SOLUTION INTRAVENOUS; SUBCUTANEOUS
Status: DISCONTINUED | OUTPATIENT
Start: 2022-05-30 | End: 2022-06-17 | Stop reason: HOSPADM

## 2022-05-30 RX ADMIN — RIFAXIMIN 550 MG: 550 TABLET ORAL at 22:16

## 2022-05-30 RX ADMIN — LACTULOSE 30 G: 20 SOLUTION ORAL at 09:38

## 2022-05-30 RX ADMIN — CEFEPIME 2 G: 1 INJECTION, SOLUTION INTRAVENOUS at 15:42

## 2022-05-30 RX ADMIN — LACTULOSE 30 G: 20 SOLUTION ORAL at 17:24

## 2022-05-30 RX ADMIN — BUSPIRONE HYDROCHLORIDE 7.5 MG: 7.5 TABLET ORAL at 16:08

## 2022-05-30 RX ADMIN — FOLIC ACID 1 MG: 1 TABLET ORAL at 08:52

## 2022-05-30 RX ADMIN — OXYCODONE HYDROCHLORIDE 5 MG: 5 TABLET ORAL at 20:20

## 2022-05-30 RX ADMIN — QUETIAPINE FUMARATE 25 MG: 25 TABLET ORAL at 05:04

## 2022-05-30 RX ADMIN — CEFEPIME 2 G: 1 INJECTION, SOLUTION INTRAVENOUS at 08:52

## 2022-05-30 RX ADMIN — RIFAXIMIN 550 MG: 550 TABLET ORAL at 08:53

## 2022-05-30 RX ADMIN — OXYCODONE HYDROCHLORIDE 5 MG: 5 TABLET ORAL at 16:10

## 2022-05-30 RX ADMIN — QUETIAPINE FUMARATE 25 MG: 25 TABLET ORAL at 22:16

## 2022-05-30 RX ADMIN — LORAZEPAM 1 MG: 2 INJECTION INTRAMUSCULAR; INTRAVENOUS at 09:15

## 2022-05-30 RX ADMIN — Medication 10 ML: at 08:56

## 2022-05-30 RX ADMIN — FAMOTIDINE 20 MG: 10 INJECTION INTRAVENOUS at 08:56

## 2022-05-30 RX ADMIN — BUDESONIDE 0.5 MG: 0.5 SUSPENSION RESPIRATORY (INHALATION) at 20:53

## 2022-05-30 RX ADMIN — FLUTICASONE PROPIONATE 2 SPRAY: 50 SPRAY, METERED NASAL at 08:54

## 2022-05-30 RX ADMIN — LACTULOSE 30 G: 20 SOLUTION ORAL at 01:22

## 2022-05-30 RX ADMIN — OXYCODONE HYDROCHLORIDE 5 MG: 5 TABLET ORAL at 05:03

## 2022-05-30 RX ADMIN — FAMOTIDINE 20 MG: 10 INJECTION INTRAVENOUS at 22:15

## 2022-05-30 RX ADMIN — SERTRALINE 25 MG: 25 TABLET, FILM COATED ORAL at 09:36

## 2022-05-30 RX ADMIN — Medication 100 MG: at 08:53

## 2022-05-30 RX ADMIN — BUSPIRONE HYDROCHLORIDE 7.5 MG: 7.5 TABLET ORAL at 22:15

## 2022-05-30 RX ADMIN — BUSPIRONE HYDROCHLORIDE 7.5 MG: 7.5 TABLET ORAL at 08:53

## 2022-05-30 RX ADMIN — OXYCODONE HYDROCHLORIDE 5 MG: 5 TABLET ORAL at 09:36

## 2022-05-31 ENCOUNTER — PATIENT OUTREACH (OUTPATIENT)
Dept: CASE MANAGEMENT | Facility: OTHER | Age: 57
End: 2022-05-31

## 2022-05-31 DIAGNOSIS — K74.60 CIRRHOSIS OF LIVER WITH ASCITES, UNSPECIFIED HEPATIC CIRRHOSIS TYPE: Primary | ICD-10-CM

## 2022-05-31 DIAGNOSIS — I10 HYPERTENSION, UNSPECIFIED TYPE: ICD-10-CM

## 2022-05-31 DIAGNOSIS — R18.8 CIRRHOSIS OF LIVER WITH ASCITES, UNSPECIFIED HEPATIC CIRRHOSIS TYPE: Primary | ICD-10-CM

## 2022-05-31 DIAGNOSIS — K76.82 HEPATIC ENCEPHALOPATHY: ICD-10-CM

## 2022-05-31 LAB
GLUCOSE BLDC GLUCOMTR-MCNC: 103 MG/DL (ref 70–99)
GLUCOSE BLDC GLUCOMTR-MCNC: 137 MG/DL (ref 70–99)
GLUCOSE BLDC GLUCOMTR-MCNC: 145 MG/DL (ref 70–99)
GLUCOSE BLDC GLUCOMTR-MCNC: 164 MG/DL (ref 70–99)

## 2022-05-31 PROCEDURE — 94799 UNLISTED PULMONARY SVC/PX: CPT

## 2022-05-31 PROCEDURE — 99233 SBSQ HOSP IP/OBS HIGH 50: CPT | Performed by: INTERNAL MEDICINE

## 2022-05-31 PROCEDURE — 63710000001 INSULIN LISPRO (HUMAN) PER 5 UNITS: Performed by: FAMILY MEDICINE

## 2022-05-31 PROCEDURE — 63710000001 INSULIN DETEMIR PER 5 UNITS: Performed by: FAMILY MEDICINE

## 2022-05-31 PROCEDURE — 92610 EVALUATE SWALLOWING FUNCTION: CPT

## 2022-05-31 PROCEDURE — 97116 GAIT TRAINING THERAPY: CPT

## 2022-05-31 PROCEDURE — 97161 PT EVAL LOW COMPLEX 20 MIN: CPT

## 2022-05-31 PROCEDURE — 97165 OT EVAL LOW COMPLEX 30 MIN: CPT

## 2022-05-31 PROCEDURE — 25010000002 CEFEPIME PER 500 MG: Performed by: FAMILY MEDICINE

## 2022-05-31 PROCEDURE — 82962 GLUCOSE BLOOD TEST: CPT

## 2022-05-31 RX ORDER — FAMOTIDINE 20 MG/1
20 TABLET, FILM COATED ORAL
Status: DISCONTINUED | OUTPATIENT
Start: 2022-05-31 | End: 2022-06-17 | Stop reason: HOSPADM

## 2022-05-31 RX ADMIN — RIFAXIMIN 550 MG: 550 TABLET ORAL at 20:12

## 2022-05-31 RX ADMIN — BUDESONIDE 0.5 MG: 0.5 SUSPENSION RESPIRATORY (INHALATION) at 20:33

## 2022-05-31 RX ADMIN — LACTULOSE 30 G: 20 SOLUTION ORAL at 17:42

## 2022-05-31 RX ADMIN — QUETIAPINE FUMARATE 25 MG: 25 TABLET ORAL at 13:55

## 2022-05-31 RX ADMIN — SERTRALINE 25 MG: 25 TABLET, FILM COATED ORAL at 08:26

## 2022-05-31 RX ADMIN — CEFEPIME 2 G: 1 INJECTION, SOLUTION INTRAVENOUS at 00:13

## 2022-05-31 RX ADMIN — CEFEPIME 2 G: 1 INJECTION, SOLUTION INTRAVENOUS at 08:26

## 2022-05-31 RX ADMIN — FLUTICASONE PROPIONATE 2 SPRAY: 50 SPRAY, METERED NASAL at 08:27

## 2022-05-31 RX ADMIN — QUETIAPINE FUMARATE 25 MG: 25 TABLET ORAL at 05:56

## 2022-05-31 RX ADMIN — BUSPIRONE HYDROCHLORIDE 7.5 MG: 7.5 TABLET ORAL at 20:12

## 2022-05-31 RX ADMIN — Medication 10 ML: at 00:15

## 2022-05-31 RX ADMIN — INSULIN LISPRO 2 UNITS: 100 INJECTION, SOLUTION INTRAVENOUS; SUBCUTANEOUS at 11:51

## 2022-05-31 RX ADMIN — OXYCODONE HYDROCHLORIDE 5 MG: 5 TABLET ORAL at 05:56

## 2022-05-31 RX ADMIN — Medication 100 MG: at 08:26

## 2022-05-31 RX ADMIN — CEFEPIME 2 G: 1 INJECTION, SOLUTION INTRAVENOUS at 18:31

## 2022-05-31 RX ADMIN — LACTULOSE 30 G: 20 SOLUTION ORAL at 08:25

## 2022-05-31 RX ADMIN — OXYCODONE HYDROCHLORIDE 5 MG: 5 TABLET ORAL at 10:14

## 2022-05-31 RX ADMIN — OXYCODONE HYDROCHLORIDE 5 MG: 5 TABLET ORAL at 20:12

## 2022-05-31 RX ADMIN — OXYCODONE HYDROCHLORIDE 5 MG: 5 TABLET ORAL at 13:54

## 2022-05-31 RX ADMIN — BUSPIRONE HYDROCHLORIDE 7.5 MG: 7.5 TABLET ORAL at 08:26

## 2022-05-31 RX ADMIN — BUDESONIDE 0.5 MG: 0.5 SUSPENSION RESPIRATORY (INHALATION) at 07:01

## 2022-05-31 RX ADMIN — BUSPIRONE HYDROCHLORIDE 7.5 MG: 7.5 TABLET ORAL at 16:18

## 2022-05-31 RX ADMIN — FOLIC ACID 1 MG: 1 TABLET ORAL at 08:26

## 2022-05-31 RX ADMIN — OXYCODONE HYDROCHLORIDE 5 MG: 5 TABLET ORAL at 00:13

## 2022-05-31 RX ADMIN — FAMOTIDINE 20 MG: 10 INJECTION INTRAVENOUS at 08:25

## 2022-05-31 RX ADMIN — LACTULOSE 30 G: 20 SOLUTION ORAL at 11:51

## 2022-05-31 RX ADMIN — FAMOTIDINE 20 MG: 20 TABLET ORAL at 17:42

## 2022-05-31 RX ADMIN — Medication 10 ML: at 08:27

## 2022-05-31 RX ADMIN — QUETIAPINE FUMARATE 25 MG: 25 TABLET ORAL at 22:54

## 2022-05-31 RX ADMIN — RIFAXIMIN 550 MG: 550 TABLET ORAL at 08:26

## 2022-05-31 RX ADMIN — INSULIN DETEMIR 10 UNITS: 100 INJECTION, SOLUTION SUBCUTANEOUS at 09:32

## 2022-05-31 NOTE — OUTREACH NOTE
[facility] End of Month Documentation    This Chronic Medical Management Care Plan for Nic Nicolas, 57 y.o. male, has been monitored and managed; reviewed and a new plan of care implemented for the month of May.  A cumulative time of 45  minutes was spent on this patient record this month, including electronic communication with primary care provider; chart review; electronic communication with other providers, Chart review , electronic communication to provider , multiple phone calls.    Regarding the patient's problems: has Arthritis, senescent; Body mass index (BMI) 40.0-44.9, adult (HCC); Colon polyps; Cirrhosis (HCC); Multiple episodes of deep venous thrombosis (HCC); High blood pressure; Hyperlipidemia; Pancytopenia (HCC); Sleep apnea; Systolic congestive heart failure (HCC); Bilateral lower extremity edema; Chronic cystitis; Chronic low back pain; Diabetes mellitus without complication (HCC); Acid reflux; Acetabular fracture (HCC); Gross hematuria; Hesitancy of micturition; Gastrointestinal hemorrhage associated with peptic ulcer; Anxiety; Hepatic encephalopathy (HCC); Stroke (HCC); Thrombocytopenia (HCC); and Amphetamine abuse (HCC) on their problem list., the following items were addressed: medical records; medications; referrals to community service providers; transitions to medical care, patient admitted and any changes can be found within the plan section of the note.  A detailed listing of time spent for chronic care management is tracked within each outreach encounter.  Current medications include:  has a current medication list which includes the following prescription(s): albuterol sulfate hfa, atorvastatin, buspirone, diclofenac sodium, fluticasone, fluticasone, furosemide, glucose blood, insulin detemir, insulin lispro, lactulose, freestyle, omeprazole, ondansetron odt, potassium chloride, rifaximin, sertraline, and spironolactone, and the following Facility-Administered Medications: albuterol,  budesonide, buspirone, cefepime, dextrose, dextrose, famotidine, fluticasone, folic acid, glucagon (human recombinant), haloperidol lactate, insulin detemir, insulin lispro, lactulose, lactulose, ondansetron, ondansetron, oxycodone, cefepime hcl, quetiapine, rifaximin, sertraline, simethicone, sodium chloride, sodium chloride, sodium chloride, sodium chloride, and thiamine. and the patient is reported to be patient is noncompliant with medication protocol,  Medications are reported to be non-effective in controlling symptoms and changes have been made to the medication protocol.All notes on chart for PCP to review.    The patient was monitored remotely for blood pressure; blood glucose; mood & behavior; pain; medications, poly diagnosis.    The patient's physical needs include:  help taking medications as prescribed; needs assistance with ADLs; resources for disability needs; physical healthcare; medication education; physician referral.     The patient's mental support needs include:  increased support; coordination of community providers; mental health education    The patient's cognitive support needs include:  increased support; medication; health care; emotional care; supervision    The patient's psychosocial support needs include:  need for increased support    The patient's functional needs include: physician referral; medication education; resources for disability needs    The patient's environmental needs include:  no involvement in outside activities or no access to other activities; no access to transportation, N/A    Care Plan overall comments:  On going eval New care plan in place    Refer to previous outreach notes for more information on the areas listed above.    Monthly Billing Diagnoses  (K74.60,  R18.8) Cirrhosis of liver with ascites, unspecified hepatic cirrhosis type (HCC)    (I10) Hypertension, unspecified type    (K72.90) Hepatic encephalopathy (HCC)    Medications   · Medications have been  reconciled    Care Plan progress this month:      Recently Modified Care Plans Updates made since 4/30/2022 12:00 AM     Wellness (Adult)         Problem Priority Last Modified     ELS HEALTHY NUTRITION (WELLNESS) (ADULT) --  3/7/2022 10:15 AM by Fili Davies MA              Goal Recent Progress Last Modified     Healthy Nutrition Achieved --  3/7/2022 10:15 AM by Fili Davies MA     Evidence-based guidance:   Assess patient perspective on healthy weight, weight loss or weight gain, motivation and readiness for change.   Recommend or set healthy weight goal based on body mass index.   Review current dietary intake and exercise levels.   Encourage small steps toward making change to eating and exercising.   Provide individualized medical nutrition therapy.    Notes:              Task Due Date Last Modified     Alleviate Barriers to Healthy Eating --  5/25/2022 10:56 AM by Fili Davies MA     Care Management Activities:      - not discussed during this outreach      Notes: Chart review only / patient hospitalized                   Chronic Pain (Adult)         Problem Priority Last Modified     ELS PAIN MANAGEMENT PLAN (CHRONIC PAIN) (ADULT) --  3/7/2022 10:16 AM by Fili Davies MA              Goal Recent Progress Last Modified     Pain Management Plan Developed --  3/7/2022 10:16 AM by Fili Davies MA     Evidence-based guidance:   Acknowledge patient as the expert in pain self-management.   Partner to set a comfort goal that allows for return to work, school, usual activity and acceptable quality of life.   Assess pain level, usual behavior with and without pain and symptoms that commonly occur with chronic pain, such as fatigue, sleep problems, cognitive and mood disturbance; use a consistent pain scale.   Determine if pain is associated with mobility or at rest, location, intensity, frequency, duration, recurrence, pattern, triggers, relieving factors and description, such as cramping, burning or aching.  "   Mutually develop an interdisciplinary, multi-modal and detailed pain management plan with patient and family or caregiver; track the patient's response to pain management and adjust plan as needed.    Notes:              Task Due Date Last Modified     Partner to Develop Chronic Pain Management Plan --  5/25/2022 10:56 AM by Fili Davies MA     Care Management Activities:      - not discussed during this outreach      Notes: Chart review only / patient hospitalized                Problem Priority Last Modified     ELS CHRONIC PAIN MANAGEMENT (CHRONIC PAIN) (ADULT) --  3/7/2022 10:16 AM by Fili Davies MA              Goal Recent Progress Last Modified     Chronic Pain Managed --  3/7/2022 10:16 AM by Fili Davies MA     Evidence-based guidance:   Address common beliefs about pain, such as pain is to be endured, a normal part of aging or that it is not \"real\"; feelings of resignation that nothing can be done and that complaining will be a sign of weakness.   Assess pain level, treatment efficacy and patient response at regular intervals using a consistent pain scale.   Assess pain using self-report (most reliable), family/caregiver report, validated pain scale; consider impact on quality of life.   Determine if pain is associated with mobility or at rest, location, intensity, frequency, duration, recurrence, pattern and description (e.g., cramping, burning, aching), triggers and relieving factors.    Anticipate referral to pain education program, pain management support or community resources for specific diagnoses (e.g., cancer, fibromyalgia, multiple sclerosis).   Explore fears associated with anticipated or imagined pain; encourage acceptance-based approaches.   Encourage exposure to experiences previously avoided due to fear of pain.   Anticipate referral to pain management specialist, physical therapist, addiction specialist (if history of substance use), psychotherapist; advocate for consultation with " pharmacist.   Initiate nonpharmacologic measures, such as cognitive behavior therapy, mindfulness, guided imagery, massage, distraction, relaxation, chiropractic manipulation, dietary supplements or acupuncture.   Provide multimodal treatment interventions, such as physical activity, therapeutic exercise, yoga, TENS (transcutaneous electrical nerve stimulation) and manual therapy.   Train in functional activity modifications, such as body mechanics, posture, ergonomics, energy conservation and activity pacing.   Encourage use of local anesthetic or analgesic therapy as an adjunct for pain control (e.g., lidocaine patch, capsaicin cream, topical nonsteroidal anti-inflammatory drugs).   Prepare patient for use of pharmacologic therapy in a stepped approach that may include acetaminophen, nonsteroidal anti-inflammatory drugs, opioid, antiepileptic, antidepressant or nonbenzodiazepine muscle relaxant.   Review efficacy, tolerability, adherence and manage medication-induced side effects.    Notes:              Task Due Date Last Modified     Alleviate Barriers to Chronic Pain Management --  5/25/2022 10:56 AM by Fili Davies MA     Care Management Activities:      - not discussed during this outreach      Notes: Chart review only / patient hospitalized                Problem Priority Last Modified     ELS HARM OR INJURY (CHRONIC PAIN) (ADULT) --  3/7/2022 10:16 AM by Fili Davies MA              Goal Recent Progress Last Modified     Harm or Injury Prevented --  3/7/2022 10:16 AM by Fili Davies MA     Evidence-based guidance:   Address risk for personal injury, such as falls, motor vehicle accidents, self-harm due to medication side effects, compromised mobility or diminished perception, reasoning, decision-making and judgment.   Engage family in providing a safe home environment and closely monitoring changes in the patient's level of sedation and emotional state, such as negativity, hopelessness and suicidal  ideation.   Explore suicidal tendencies compassionately, yet directly, by asking about suicidal ideation, attempt history and family history.   Maintain frequent, structured and supportive contact, such as by phone, office or home visit; collaborate closely with behavioral health specialists or psychiatry.   Make immediate arrangements for evaluation at emergency department, community mental health agency or other psychiatric service when patient expresses positive suicidal ideation with a plan and access to lethal means.   Promote fall risk prevention by making home and environmental adjustments; provide clear instructions regarding ability to drive.   Assess for opioid-induced constipation; provide anticipatory guidance regarding prevention when beginning opioid use by using stool softener or laxative, as well as increasing fluids and dietary fiber.   When opioid-induced constipation is noted, optimize lifestyle interventions and anticipate the use of opioid antagonist as well as tapering, discontinuation or change of opioid.   Encourage frequent oral hygiene (brushing and rinsing), the use of xylitol-containing gum as well as sugar-free and decaffeinated beverages when dry mouth is reported.   Assess for signs and symptoms of opioid endocrinopathy, such as sexual dysfunction, decreased libido, osteoporosis, osteopenia or infertility.   When endocrinopathy is present, anticipate changing opioid medication, tapering or discontinuation of opioid or initiation of hormone supplementation.   Assess for signs/symptoms of sleep-disordered breathing.   Anticipate referral for sleep study and potential tapering or discontinuation of opioid and initiation of noninvasive positive pressure breathing or adaptive servo ventilation device.   Evaluate risk of opioid misuse prior to or early in treatment with opioids.   Provide anticipatory guidance regarding use and misuse of opioid medication, including not crushing pills,  dissolving in juice or mixing with applesauce; consider change to crush-resistant opioid.   Complete periodic screening for opioid misuse and/or signs of substance tolerance (increased dose to reach desired effect, decreased effect with same dose).   Monitor drug-taking behaviors, such as hoarding medication, independently increasing dose, using for other than analgesia and seeing multiple physicians for prescriptions; review state prescription drug monitoring database.   Consider written agreement if patient has used opioids for more than 30 days or episodically over 1 year; required use of nonpharmacologic therapy, urine testing, pill counting, banning sharing or selling of opioids.   When tapering or stopping opioids, frame the discussion in terms of safety and efficacy; reaffirm commitment to patient's health and new treatment plan; respond to fear with empathy; maintain consistent message and approach.    Notes:              Task Due Date Last Modified     Identify and Reduce Risks for Harm or Injury --  5/25/2022 10:57 AM by Fili Davies MA     Care Management Activities:      - not discussed during this outreach      Notes: Chart review only / patient hospitalized                   Diabetes Type 2 (Adult)         Problem Priority Last Modified     ELS GLYCEMIC MANAGEMENT (DIABETES, TYPE 2) (ADULT) --  4/13/2022 12:41 PM by Fili Davies MA              Goal Recent Progress Last Modified     Glycemic Management Optimized --  4/13/2022 12:41 PM by Fili Davies MA     Evidence-based guidance:   Anticipate A1C testing (point-of-care) every 3 to 6 months based on goal attainment.   Review mutually-set A1C goal or target range.   Anticipate use of antihyperglycemic with or without insulin and periodic adjustments; consider active involvement of pharmacist.   Provide medical nutrition therapy and development of individualized eating.   Compare self-reported symptoms of hypo or hyperglycemia to blood glucose  levels, diet and fluid intake, current medications, psychosocial and physiologic stressors, change in activity and barriers to care adherence.   Promote self-monitoring of blood glucose levels.   Assess and address barriers to management plan, such as food insecurity, age, developmental ability, depression, anxiety, fear of hypoglycemia or weight gain, as well as medication cost, side effects and complicated regimen.   Consider referral to community-based diabetes education program, visiting nurse, community health worker or health .   Encourage regular dental care for treatment of periodontal disease; refer to dental provider when needed.    Notes:              Task Due Date Last Modified     Alleviate Barriers to Glycemic Management --  5/25/2022 10:58 AM by Fili Davies MA     Care Management Activities:      - not discussed during this outreach      Notes: Chart review only / patient hospitalized                Problem Priority Last Modified     ELS DISEASE PROGRESSION (DIABETES, TYPE 2) (ADULT) --  4/13/2022 12:41 PM by Fili Davies MA              Goal Recent Progress Last Modified     Disease Progression Prevented or Minimized --  4/13/2022 12:41 PM by Fili Davies MA     Evidence-based guidance:   Prepare patient for laboratory and diagnostic exams based on risk and presentation.   Encourage lifestyle changes, such as increased intake of plant-based foods, stress reduction, consistent physical activity and smoking cessation to prevent long-term complications and chronic disease.    Individualize activity and exercise recommendations while considering potential limitations, such as neuropathy, retinopathy or the ability to prevent hyperglycemia or hypoglycemia.    Prepare patient for use of pharmacologic therapy that may include antihypertensive, analgesic, prostaglandin E1 with periodic adjustments, based on presenting chronic condition and laboratory results.   Assess signs/symptoms and risk  factors for hypertension, sleep-disordered breathing, neuropathy (including changes in gait and balance), retinopathy, nephropathy and sexual dysfunction.   Address pregnancy planning and contraceptive choice, especially when prescribing antihypertensive or statin.   Ensure completion of annual comprehensive foot exam and dilated eye exam.    Implement additional individualized goals and interventions based on identified risk factors.   Prepare patient for consultation or referral for specialist care, such as ophthalmology, neurology, cardiology, podiatry, nephrology or perinatology.    Notes:              Task Due Date Last Modified     Monitor and Manage Follow-up for Comorbidities --  5/25/2022 10:58 AM by Fili Davies MA     Care Management Activities:      - not discussed during this outreach      Notes: Chart review only / patient hospitalized                        · Current Specialty Plan of Care Status in process    Instructions   · Patient was provided an electronic copy of care plan  · CCM services were explained and offered and patient has accepted these services.  · Patient has given their written consent to receive CCM services and understands that this includes the authorization of electronic communication of medical information with the other treating providers.  · Patient understands that they may stop CCM services at any time and these changes will be effective at the end of the calendar month and will effectively revocate the agreement of CCM services.  · Patient understands that only one practitioner can furnish and be paid for CCM services during one calendar month.  Patient also understands that there may be co-payment or deductible fees in association with CCM services.  · Patient will continue with at least monthly follow-up calls with the Nurse Navigator.    KAYLA NARANJO  Ambulatory Case Management    5/31/2022, 11:40 EDTAMBULATORY CASE MANAGEMENT NOTE    Name and Relationship of Patient/Support  Person:  -         Education Documentation  No documentation found.        KAYLA NARANJO  Ambulatory Case Management    5/31/2022, 11:40 EDT

## 2022-06-01 ENCOUNTER — APPOINTMENT (OUTPATIENT)
Dept: GENERAL RADIOLOGY | Facility: HOSPITAL | Age: 57
End: 2022-06-01

## 2022-06-01 LAB
ANION GAP SERPL CALCULATED.3IONS-SCNC: 7.5 MMOL/L (ref 5–15)
BUN SERPL-MCNC: 21 MG/DL (ref 6–20)
BUN/CREAT SERPL: 29.6 (ref 7–25)
CALCIUM SPEC-SCNC: 8.3 MG/DL (ref 8.6–10.5)
CHLORIDE SERPL-SCNC: 106 MMOL/L (ref 98–107)
CO2 SERPL-SCNC: 20.5 MMOL/L (ref 22–29)
CREAT SERPL-MCNC: 0.71 MG/DL (ref 0.76–1.27)
DEPRECATED RDW RBC AUTO: 49.5 FL (ref 37–54)
EGFRCR SERPLBLD CKD-EPI 2021: 107.7 ML/MIN/1.73
ERYTHROCYTE [DISTWIDTH] IN BLOOD BY AUTOMATED COUNT: 14.9 % (ref 12.3–15.4)
GLUCOSE BLDC GLUCOMTR-MCNC: 136 MG/DL (ref 70–99)
GLUCOSE BLDC GLUCOMTR-MCNC: 154 MG/DL (ref 70–99)
GLUCOSE BLDC GLUCOMTR-MCNC: 96 MG/DL (ref 70–99)
GLUCOSE SERPL-MCNC: 120 MG/DL (ref 65–99)
HCT VFR BLD AUTO: 25 % (ref 37.5–51)
HGB BLD-MCNC: 8.6 G/DL (ref 13–17.7)
MCH RBC QN AUTO: 32.6 PG (ref 26.6–33)
MCHC RBC AUTO-ENTMCNC: 34.4 G/DL (ref 31.5–35.7)
MCV RBC AUTO: 94.7 FL (ref 79–97)
PLATELET # BLD AUTO: 47 10*3/MM3 (ref 140–450)
PMV BLD AUTO: 10.7 FL (ref 6–12)
POTASSIUM SERPL-SCNC: 4.2 MMOL/L (ref 3.5–5.2)
RBC # BLD AUTO: 2.64 10*6/MM3 (ref 4.14–5.8)
SODIUM SERPL-SCNC: 134 MMOL/L (ref 136–145)
WBC NRBC COR # BLD: 2.98 10*3/MM3 (ref 3.4–10.8)

## 2022-06-01 PROCEDURE — 63710000001 INSULIN LISPRO (HUMAN) PER 5 UNITS: Performed by: FAMILY MEDICINE

## 2022-06-01 PROCEDURE — 94799 UNLISTED PULMONARY SVC/PX: CPT

## 2022-06-01 PROCEDURE — 73030 X-RAY EXAM OF SHOULDER: CPT

## 2022-06-01 PROCEDURE — 99233 SBSQ HOSP IP/OBS HIGH 50: CPT | Performed by: INTERNAL MEDICINE

## 2022-06-01 PROCEDURE — 85027 COMPLETE CBC AUTOMATED: CPT | Performed by: INTERNAL MEDICINE

## 2022-06-01 PROCEDURE — 82962 GLUCOSE BLOOD TEST: CPT

## 2022-06-01 PROCEDURE — 63710000001 INSULIN DETEMIR PER 5 UNITS: Performed by: FAMILY MEDICINE

## 2022-06-01 PROCEDURE — 25010000002 CEFEPIME PER 500 MG: Performed by: FAMILY MEDICINE

## 2022-06-01 PROCEDURE — 80048 BASIC METABOLIC PNL TOTAL CA: CPT | Performed by: INTERNAL MEDICINE

## 2022-06-01 PROCEDURE — 25010000002 ONDANSETRON PER 1 MG: Performed by: HOSPITALIST

## 2022-06-01 RX ORDER — SERTRALINE HYDROCHLORIDE 25 MG/1
50 TABLET, FILM COATED ORAL DAILY
Status: DISCONTINUED | OUTPATIENT
Start: 2022-06-02 | End: 2022-06-17 | Stop reason: HOSPADM

## 2022-06-01 RX ORDER — BUSPIRONE HYDROCHLORIDE 15 MG/1
15 TABLET ORAL 2 TIMES DAILY
Status: DISCONTINUED | OUTPATIENT
Start: 2022-06-01 | End: 2022-06-17 | Stop reason: HOSPADM

## 2022-06-01 RX ADMIN — INSULIN DETEMIR 10 UNITS: 100 INJECTION, SOLUTION SUBCUTANEOUS at 09:20

## 2022-06-01 RX ADMIN — OXYCODONE HYDROCHLORIDE 5 MG: 5 TABLET ORAL at 05:25

## 2022-06-01 RX ADMIN — BUSPIRONE HYDROCHLORIDE 7.5 MG: 7.5 TABLET ORAL at 08:30

## 2022-06-01 RX ADMIN — RIFAXIMIN 550 MG: 550 TABLET ORAL at 21:59

## 2022-06-01 RX ADMIN — OXYCODONE HYDROCHLORIDE 5 MG: 5 TABLET ORAL at 08:43

## 2022-06-01 RX ADMIN — FAMOTIDINE 20 MG: 20 TABLET ORAL at 08:36

## 2022-06-01 RX ADMIN — QUETIAPINE FUMARATE 25 MG: 25 TABLET ORAL at 05:25

## 2022-06-01 RX ADMIN — RIFAXIMIN 550 MG: 550 TABLET ORAL at 08:30

## 2022-06-01 RX ADMIN — LACTULOSE 30 G: 20 SOLUTION ORAL at 11:48

## 2022-06-01 RX ADMIN — LACTULOSE 30 G: 20 SOLUTION ORAL at 08:36

## 2022-06-01 RX ADMIN — SERTRALINE 25 MG: 25 TABLET, FILM COATED ORAL at 08:30

## 2022-06-01 RX ADMIN — OXYCODONE HYDROCHLORIDE 5 MG: 5 TABLET ORAL at 14:06

## 2022-06-01 RX ADMIN — CEFEPIME 2 G: 1 INJECTION, SOLUTION INTRAVENOUS at 00:16

## 2022-06-01 RX ADMIN — BUDESONIDE 0.5 MG: 0.5 SUSPENSION RESPIRATORY (INHALATION) at 07:42

## 2022-06-01 RX ADMIN — CEFEPIME 2 G: 1 INJECTION, SOLUTION INTRAVENOUS at 10:37

## 2022-06-01 RX ADMIN — ONDANSETRON 4 MG: 2 INJECTION INTRAMUSCULAR; INTRAVENOUS at 18:35

## 2022-06-01 RX ADMIN — FAMOTIDINE 20 MG: 20 TABLET ORAL at 16:19

## 2022-06-01 RX ADMIN — BUSPIRONE HYDROCHLORIDE 7.5 MG: 7.5 TABLET ORAL at 16:19

## 2022-06-01 RX ADMIN — BUSPIRONE HYDROCHLORIDE 15 MG: 15 TABLET ORAL at 21:59

## 2022-06-01 RX ADMIN — OXYCODONE HYDROCHLORIDE 5 MG: 5 TABLET ORAL at 00:16

## 2022-06-01 RX ADMIN — Medication 100 MG: at 08:30

## 2022-06-01 RX ADMIN — FLUTICASONE PROPIONATE 2 SPRAY: 50 SPRAY, METERED NASAL at 08:36

## 2022-06-01 RX ADMIN — Medication 10 ML: at 08:32

## 2022-06-01 RX ADMIN — Medication 10 ML: at 20:10

## 2022-06-01 RX ADMIN — INSULIN LISPRO 2 UNITS: 100 INJECTION, SOLUTION INTRAVENOUS; SUBCUTANEOUS at 12:58

## 2022-06-01 RX ADMIN — OXYCODONE HYDROCHLORIDE 5 MG: 5 TABLET ORAL at 20:09

## 2022-06-01 RX ADMIN — LACTULOSE 30 G: 20 SOLUTION ORAL at 16:38

## 2022-06-01 RX ADMIN — FOLIC ACID 1 MG: 1 TABLET ORAL at 08:30

## 2022-06-01 NOTE — NURSING NOTE
"Patient is currently on pcu. Updated by primary rn regarding patients current condition. Patient has close watch at bedside, no family in room at time of visit. Palliative care visit to discuss goals of care and code status. Patient is alert and oriented to person, place, time and situation. While attempting to discuss goals of care patient fixated on patient wristband, frequently repeating \"D O B.\" Patient unwilling and unable to participate in goals of care discussions at this time. Call placed to patients next of kin listed, patients Medina wright. Introduced self and explained role. Discussed patients current condition and provided update. Patients sister states patient \"has just been acting so different.\" Attempted to determine next of kin for patient. Patients sister states patient does have 2 adult living children, one son and one daughter. Patients sister states she believes they have been estranged for \"more then 10 years.\" Patients sister states she does not have any contact information for patients children but she could \"try and get it\" but patients daughter \"is slow.\" Patients sister states she is willing to make medical decisions for patient as needed, but does not want to be involved in any financial responsibilities. Patients sister states patient is , patients mother and father have both passed away and she is the only remaining sibling. Patients sister states she has never had discussions regarding patients wishes or code status with patient but she thinks she knows what patient would want. Patients sister requests to continue current aggressive treatment and patient would want cpr. Current code status reflects this. Patients sister states he \"wouldn't want to live on machines though.\" Emotional support provided. At this time, no changes in current plan of care. Patients sister requests patient be discharged to rehab because \"hes not safe at home.\" Updated unit . Palliative " care will continue to follow to support and assist as needed.    HUNTER FinchN, RN  Palliative Care

## 2022-06-01 NOTE — PLAN OF CARE
"Goal Outcome Evaluation:              Outcome Evaluation: Patient has been A&Ox4 for me.Did show some odd behavoir when MD rounded of confusion, so I discussed with patient decreasing frequency of pain meds due to periods of confusion and patietn came back around orientation wise and got very angry. Patient then proceeded to cry very loudly because of his shouolder pain, MD okayed to do pain meds due to patients pain level. Patient got an Xray of his shoulder. He has had multiple BMs from lactulose. Patient is very repeatitive with his actions like opening and closing phone or turning lights on and off. Pt reported no signs of sucidial ideation but that he is \"tired of always being in pain\" . Palliative on board and has been reaching out to family. VSS will continue to monitor  "

## 2022-06-01 NOTE — PROGRESS NOTES
[facility]   Hospitalist Progress Note  Date: 2022  Patient Name: Nic Nicolas  : 1965  MRN: [id number]  Date of admission: 2022      Subjective   Subjective     Chief Complaint: Follow up for altered mental status    Interval Followup:   Afebrile overnight.  Blood pressure well controlled.  Up in chair this morning.  Seems a little bit more confused and having repetitive speech.  Not received any Haldol.  RN reports a change in his mentation after he received oxycodone this morning.  Only 1 BM documented yesterday.  No nausea, vomiting or abdominal pain.Complaining of right shoulder pain, dull, constant, worse with movement, unable to lift shoulder above head, reports recent fall down a flight of stairs. RN reports conversation earlier with patient where he denied any further suicidal ideation. Has been able to urinate following Zamora removal.     Review of Systems  All systems reviewed and negative unless listed above    Objective   Objective     Vitals:   Temp:  [98.1 °F (36.7 °C)-98.8 °F (37.1 °C)] 98.1 °F (36.7 °C)  Heart Rate:  [87-93] 92  Resp:  [12-20] 17  BP: (117-145)/(54-63) 117/54  Physical Exam    Constitutional:  male, alert, up in chair, no acute distress   Eyes: Pupils equal and reactive, no conjunctival injection   HENT: NCAT, MMM, nares patent   Neck: Supple, trachea midline   Respiratory: Clear to auscultation bilaterally, nonlabored respirations    Cardiovascular: RRR, no murmurs, no pedal edema   Gastrointestinal: Positive bowel sounds, soft, nontender, mild distention   Musculoskeletal: Right shoulder no gross deformity.  No significant swelling.  Tender to palpation.  Active ROM abduction limited to 60 degress.  Abduction up to 90 degrees with passive ROM. right clavicle no gross deformity, No clubbing or cyanosis to extremities   Neurologic: Oriented x 2, Cranial Nerves grossly intact, moving all 4 extremity spontaneously, no facial droop   Skin: Warm and  dry, no rashes     Result Review    Result Review:  I have personally reviewed the results from the time of this admission to 6/1/2022 11:04 EDT and agree with these findings:  [x]  Laboratory  CBC    CBC 5/28/22 5/29/22 6/1/22   WBC 3.74 2.90 (A) 2.98 (A)   RBC 3.03 (A) 2.99 (A) 2.64 (A)   Hemoglobin 9.9 (A) 9.7 (A) 8.6 (A)   Hematocrit 29.0 (A) 28.9 (A) 25.0 (A)   MCV 95.7 96.7 94.7   MCH 32.7 32.4 32.6   MCHC 34.1 33.6 34.4   RDW 14.6 14.5 14.9   Platelets 60 (A) 56 (A) 47 (A)   (A) Abnormal value            BMP    BMP 5/29/22 5/30/22 6/1/22   BUN 27 (A) 26 (A) 21 (A)   Creatinine 0.94 0.80 0.71 (A)   Sodium 136 136 134 (A)   Potassium 3.8 4.0 4.2   Chloride 103 106 106   CO2 23.0 23.9 20.5 (A)   Calcium 9.2 8.6 8.3 (A)   (A) Abnormal value              [x]  Microbiology blood cultures NGTD  []  Radiology  [x]  EKG/Telemetry NSR/sinus tachycardia  []  Cardiology/Vascular   []  Pathology  []  Old records  [x]  Other:     Intake/Output Summary (Last 24 hours) at 6/1/2022 1104  Last data filed at 6/1/2022 0917  Gross per 24 hour   Intake 600 ml   Output --   Net 600 ml         Assessment & Plan   Assessment / Plan     Assessment:  Right shoulder pain with decreased ROM  Hepatic encephalopathy  ROMO cirrhosis  Pancytopenia likely related to cirrhosis  Insulin-dependent diabetes mellitus - blood sugars controlled  Obstructive sleep apnea not on home CPAP  History of traumatic brain injury  Mild hyperbilirubinemia  Major depression with suicidal ideation  Acute Respiratory Alkalosis , resolved  Thrombocytopenia   Acute kidney injury secondary to prerenal etiology, resolved      Plan:    Check right shoulder x-ray 2 view  Stop Seroquel and hold further narcotics for today  Check ammonia level  Continue lactulose 30 g three times a day  Continue rifaximin 550 mg twice daily  Continue daily folic acid and p.o thiamine replacement  Continue Levemir 10 units daily with moderate dose SSI per protocol  Complete course of  cefepime today  Continue BuSpar 7.5 mg three times a day  Continue Zoloft 25 mg daily   Continue one-to-one sitter   Awaiting psychiatry reevaluation  Continue PT/OT.  Up to chair daily  CBC, CMP in a.m.    Discussed plan with RN.    DVT prophylaxis:  Mechanical DVT prophylaxis orders are present.    CODE STATUS:   Code Status (Patient has no pulse and is not breathing): CPR (Attempt to Resuscitate)  Medical Interventions (Patient has pulse or is breathing): Full Support    Electronically signed by Ron Strong DO, 06/01/22, 11:09 AM EDT.

## 2022-06-01 NOTE — PROGRESS NOTES
[facility]     Psychiatric Progress Note    Patient Name: Nic Nicolas  : 1965  MRN: [id number]  Primary Care Physician:  Jonh Franco Jr., MD  Date of admission: 2022    Subjective   Subjective     Chief Complaint: Altered mental status    HPI:     Patient seen and chart reviewed, discussed with staff.    Patient sitting in his bed.  He is inattentive during interview.  Patient continually adjusting the volume to his TV and changing the channel.  Answers questions very minimally.  Difficult to get his attention away from the TV.  Nursing staff reports that he does this frequently and will play with the blinds in the room incessantly, or find something else to occupy his mind with.  It is difficult to engage and inattentive.    Patient denied suicidal ideations on numerous occasions throughout the interview.  He even actually sounded very surprised and emphatically responded no when asked about it on the first occasion.  He denies that he is confused and mixed up refuses to answer questions about orientation.  Patient with long history of substance use and liver disease.  Suspect he has an underlying cognitive disorder secondary to his medical problems and substance use and possibly in early dementia.    Is calm and cooperative at this time.  He is engaging.  He is not made any threats to himself.  He is been denying suicidal ideations.      Objective   Objective     Vitals:   Temp:  [97.3 °F (36.3 °C)-98.8 °F (37.1 °C)] 97.3 °F (36.3 °C)  Heart Rate:  [87-96] 96  Resp:  [12-20] 18  BP: (117-145)/(54-84) 136/84          Mental Status Exam:      Appearance:   Sitting up on the side of bed, cooperative.  Rather inattentive.  No restlessness or agitation  Reliability:   Poor  Eye Contact:   Limited  Concentration/Focus:    Inattentive to the interview, distracted by TV  Behaviors:    No agitation or combativeness  Memory :    Unable to assess due to his inattentiveness and not answering  "questions  Speech:    Normal rate and volume  Language:   Fluent  Mood :    \"Okay\"  Affect:    Constricted  Thought process:    Guarded, appears confused, preoccupied  Thought Content:    Denies suicidal or homicidal ideation, denies hallucinations and none evident  Insight:   Limited  Judgement:    Limited      Result Review    Result Review:  I have personally reviewed the results from the time of this admission to 6/1/2022 19:07 EDT and agree with these findings:  []  Laboratory  []  Microbiology  []  Radiology  []  EKG/Telemetry   []  Cardiology/Vascular   []  Pathology  []  Old records  []  Other:  Most notable findings include:     Medications:   budesonide, 0.5 mg, Nebulization, BID - RT  busPIRone, 15 mg, Oral, BID  famotidine, 20 mg, Oral, BID AC  fluticasone, 2 spray, Each Nare, Daily  folic acid, 1 mg, Oral, Daily  insulin detemir, 10 Units, Subcutaneous, Daily  insulin lispro, 0-9 Units, Subcutaneous, TID With Meals  lactulose, 30 g, Oral, TID AC  rifAXIMin, 550 mg, Oral, Q12H  [START ON 6/2/2022] sertraline, 50 mg, Oral, Daily  sodium chloride, 10 mL, Intravenous, Q12H  thiamine, 100 mg, Oral, Daily          Assessment / Plan       Active Hospital Problems:  Active Hospital Problems    Diagnosis    • Hepatic encephalopathy (HCC)    • Pancytopenia (HCC)        Plan:   1) we will simplify the buspirone and increase to 15 mg and changed to twice daily dosing  2) increase sertraline to 50 mg  3) May consider medicine for memory including Namenda or Aricept  4) may benefit from full neuropsychiatric evaluation to evaluate for emerging dementia or degree of cognitive impairment  5) denying suicidal ideation and will discontinue sitter  6) no need for acute inpatient psychiatric care  7) we will follow peripherally make treatment recommendations as indicated    Disposition:  I expect patient to be discharged primary team's plan.    Electronically signed by Odell Stokes MD, 06/01/22, 7:07 PM EDT.    "

## 2022-06-02 LAB
ALBUMIN SERPL-MCNC: 3.3 G/DL (ref 3.5–5.2)
ALBUMIN/GLOB SERPL: 1 G/DL
ALP SERPL-CCNC: 106 U/L (ref 39–117)
ALT SERPL W P-5'-P-CCNC: 31 U/L (ref 1–41)
AMMONIA BLD-SCNC: 26 UMOL/L (ref 16–60)
ANION GAP SERPL CALCULATED.3IONS-SCNC: 10.2 MMOL/L (ref 5–15)
AST SERPL-CCNC: 53 U/L (ref 1–40)
BILIRUB SERPL-MCNC: 2.2 MG/DL (ref 0–1.2)
BUN SERPL-MCNC: 28 MG/DL (ref 6–20)
BUN/CREAT SERPL: 27.2 (ref 7–25)
CALCIUM SPEC-SCNC: 9 MG/DL (ref 8.6–10.5)
CHLORIDE SERPL-SCNC: 104 MMOL/L (ref 98–107)
CO2 SERPL-SCNC: 18.8 MMOL/L (ref 22–29)
CREAT SERPL-MCNC: 1.03 MG/DL (ref 0.76–1.27)
DEPRECATED RDW RBC AUTO: 53.1 FL (ref 37–54)
EGFRCR SERPLBLD CKD-EPI 2021: 85.3 ML/MIN/1.73
ERYTHROCYTE [DISTWIDTH] IN BLOOD BY AUTOMATED COUNT: 15.9 % (ref 12.3–15.4)
GLOBULIN UR ELPH-MCNC: 3.3 GM/DL
GLUCOSE BLDC GLUCOMTR-MCNC: 110 MG/DL (ref 70–99)
GLUCOSE BLDC GLUCOMTR-MCNC: 128 MG/DL (ref 70–99)
GLUCOSE BLDC GLUCOMTR-MCNC: 147 MG/DL (ref 70–99)
GLUCOSE BLDC GLUCOMTR-MCNC: 160 MG/DL (ref 70–99)
GLUCOSE SERPL-MCNC: 137 MG/DL (ref 65–99)
HCT VFR BLD AUTO: 29.2 % (ref 37.5–51)
HGB BLD-MCNC: 10.3 G/DL (ref 13–17.7)
MCH RBC QN AUTO: 33 PG (ref 26.6–33)
MCHC RBC AUTO-ENTMCNC: 35.3 G/DL (ref 31.5–35.7)
MCV RBC AUTO: 93.6 FL (ref 79–97)
PLATELET # BLD AUTO: 86 10*3/MM3 (ref 140–450)
PMV BLD AUTO: 10.4 FL (ref 6–12)
POTASSIUM SERPL-SCNC: 4.5 MMOL/L (ref 3.5–5.2)
PROT SERPL-MCNC: 6.6 G/DL (ref 6–8.5)
RBC # BLD AUTO: 3.12 10*6/MM3 (ref 4.14–5.8)
SODIUM SERPL-SCNC: 133 MMOL/L (ref 136–145)
WBC NRBC COR # BLD: 7.6 10*3/MM3 (ref 3.4–10.8)

## 2022-06-02 PROCEDURE — 63710000001 INSULIN LISPRO (HUMAN) PER 5 UNITS: Performed by: FAMILY MEDICINE

## 2022-06-02 PROCEDURE — 94799 UNLISTED PULMONARY SVC/PX: CPT

## 2022-06-02 PROCEDURE — 82962 GLUCOSE BLOOD TEST: CPT

## 2022-06-02 PROCEDURE — 25010000002 ONDANSETRON PER 1 MG: Performed by: HOSPITALIST

## 2022-06-02 PROCEDURE — 85027 COMPLETE CBC AUTOMATED: CPT | Performed by: INTERNAL MEDICINE

## 2022-06-02 PROCEDURE — 63710000001 INSULIN DETEMIR PER 5 UNITS: Performed by: FAMILY MEDICINE

## 2022-06-02 PROCEDURE — 82140 ASSAY OF AMMONIA: CPT | Performed by: INTERNAL MEDICINE

## 2022-06-02 PROCEDURE — 80053 COMPREHEN METABOLIC PANEL: CPT | Performed by: INTERNAL MEDICINE

## 2022-06-02 PROCEDURE — 99233 SBSQ HOSP IP/OBS HIGH 50: CPT | Performed by: INTERNAL MEDICINE

## 2022-06-02 RX ADMIN — SIMETHICONE 80 MG: 80 TABLET, CHEWABLE ORAL at 22:50

## 2022-06-02 RX ADMIN — SERTRALINE HYDROCHLORIDE 50 MG: 50 TABLET ORAL at 09:14

## 2022-06-02 RX ADMIN — LACTULOSE 30 G: 20 SOLUTION ORAL at 07:19

## 2022-06-02 RX ADMIN — FAMOTIDINE 20 MG: 20 TABLET ORAL at 17:14

## 2022-06-02 RX ADMIN — FAMOTIDINE 20 MG: 20 TABLET ORAL at 07:19

## 2022-06-02 RX ADMIN — OXYCODONE HYDROCHLORIDE 5 MG: 5 TABLET ORAL at 01:57

## 2022-06-02 RX ADMIN — BUSPIRONE HYDROCHLORIDE 15 MG: 15 TABLET ORAL at 21:13

## 2022-06-02 RX ADMIN — BUSPIRONE HYDROCHLORIDE 15 MG: 15 TABLET ORAL at 09:13

## 2022-06-02 RX ADMIN — OXYCODONE HYDROCHLORIDE 5 MG: 5 TABLET ORAL at 14:08

## 2022-06-02 RX ADMIN — OXYCODONE HYDROCHLORIDE 5 MG: 5 TABLET ORAL at 09:20

## 2022-06-02 RX ADMIN — BUDESONIDE 0.5 MG: 0.5 SUSPENSION RESPIRATORY (INHALATION) at 07:28

## 2022-06-02 RX ADMIN — INSULIN LISPRO 2 UNITS: 100 INJECTION, SOLUTION INTRAVENOUS; SUBCUTANEOUS at 17:17

## 2022-06-02 RX ADMIN — Medication 100 MG: at 09:13

## 2022-06-02 RX ADMIN — BUDESONIDE 0.5 MG: 0.5 SUSPENSION RESPIRATORY (INHALATION) at 22:19

## 2022-06-02 RX ADMIN — OXYCODONE HYDROCHLORIDE 5 MG: 5 TABLET ORAL at 21:13

## 2022-06-02 RX ADMIN — ONDANSETRON 4 MG: 2 INJECTION INTRAMUSCULAR; INTRAVENOUS at 02:03

## 2022-06-02 RX ADMIN — Medication 10 ML: at 21:13

## 2022-06-02 RX ADMIN — LACTULOSE 30 G: 20 SOLUTION ORAL at 12:24

## 2022-06-02 RX ADMIN — FOLIC ACID 1 MG: 1 TABLET ORAL at 09:14

## 2022-06-02 RX ADMIN — RIFAXIMIN 550 MG: 550 TABLET ORAL at 21:13

## 2022-06-02 RX ADMIN — FLUTICASONE PROPIONATE 2 SPRAY: 50 SPRAY, METERED NASAL at 09:14

## 2022-06-02 RX ADMIN — RIFAXIMIN 550 MG: 550 TABLET ORAL at 09:13

## 2022-06-02 RX ADMIN — INSULIN DETEMIR 10 UNITS: 100 INJECTION, SOLUTION SUBCUTANEOUS at 09:20

## 2022-06-02 RX ADMIN — LACTULOSE 30 G: 20 SOLUTION ORAL at 17:18

## 2022-06-02 NOTE — PROGRESS NOTES
[facility]   Hospitalist Progress Note  Date: 2022  Patient Name: Nic Nicolas  : 1965  MRN: [id number]  Date of admission: 2022      Subjective   Subjective     Chief Complaint: Follow up for altered mental status    Interval Followup: No issues overnight.  Vital stable.  Sitter discontinued.  No behavioral issues since.  Patient remains with some mild confusion but conversant and cooperative.  Having regular BMs.  No abdominal pain.  Tolerating oral intake.  Getting up independently but does feel weak and fatigued.  He reports moderate, sharp pain in the right shoulder/upper arm that comes and goes, worse with movement, improved with rest, controlled as long as he takes oxycodone every 4 hours    Review of Systems  Constitutional:  No Fever, No Chills  Cardiovascular:  No Chest Pain, No Palpitations  Respiratory:  No Cough, No Dyspnea  Gastrointestinal:  No Nausea, No Vomiting, No Constipation  Musculoskeletal:  + Right shoulder pain otherwise denied complaint  Neuro:  + Confusion, + generalized weakness    Objective   Objective     Vitals:   Temp:  [97.3 °F (36.3 °C)-99 °F (37.2 °C)] 97.9 °F (36.6 °C)  Heart Rate:  [87-96] 87  Resp:  [18-20] 20  BP: (122-153)/(66-97) 122/97  Physical Exam    Constitutional:  male, up in chair, no acute distress   Eyes: Pupils equal and reactive, no conjunctival injection   HENT: NCAT, MMM, nares patent   Neck: Supple, trachea midline   Respiratory: Clear to auscultation bilaterally, nonlabored respirations    Cardiovascular: RRR, no murmurs, no pedal edema   Gastrointestinal: Positive bowel sounds, soft, nontender, some distention   Musculoskeletal: No gross joint deformity.  Decreased right shoulder ROM.  No clubbing or cyanosis to extremities   Neurologic: Alert, oriented x 2, Cranial Nerves grossly intact, no focal deficit   Skin: Warm and dry, no rashes     Result Review    Result Review:  I have personally reviewed the results from the time  of this admission to 6/2/2022 11:13 EDT and agree with these findings:  [x]  Laboratory  CBC    CBC 5/28/22 5/29/22 6/1/22   WBC 3.74 2.90 (A) 2.98 (A)   RBC 3.03 (A) 2.99 (A) 2.64 (A)   Hemoglobin 9.9 (A) 9.7 (A) 8.6 (A)   Hematocrit 29.0 (A) 28.9 (A) 25.0 (A)   MCV 95.7 96.7 94.7   MCH 32.7 32.4 32.6   MCHC 34.1 33.6 34.4   RDW 14.6 14.5 14.9   Platelets 60 (A) 56 (A) 47 (A)   (A) Abnormal value            BMP    BMP 5/29/22 5/30/22 6/1/22   BUN 27 (A) 26 (A) 21 (A)   Creatinine 0.94 0.80 0.71 (A)   Sodium 136 136 134 (A)   Potassium 3.8 4.0 4.2   Chloride 103 106 106   CO2 23.0 23.9 20.5 (A)   Calcium 9.2 8.6 8.3 (A)   (A) Abnormal value              [x]  Microbiology blood cultures NGTD  []  Radiology  []  EKG/Telemetry   []  Cardiology/Vascular   []  Pathology  []  Old records  [x]  Other:     Intake/Output Summary (Last 24 hours) at 6/2/2022 1113  Last data filed at 6/1/2022 1821  Gross per 24 hour   Intake 580 ml   Output --   Net 580 ml         Assessment & Plan   Assessment / Plan     Assessment:  Minimally comminuted nondisplaced fracture of the greater tuberosity of the proximal humerus.  Minimally displaced fracture at the inferior glenoid.  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  Right shoulder pain with decreased ROM  Hepatic encephalopathy -> improved  ROMO cirrhosis  Pancytopenia likely related to cirrhosis  Insulin-dependent diabetes mellitus - blood sugars controlled  Obstructive sleep apnea not on home CPAP  History of traumatic brain injury  Mild hyperbilirubinemia  Major depression with suicidal ideation  Acute Respiratory Alkalosis , resolved  Thrombocytopenia   Acute kidney injury secondary to prerenal etiology, resolved      Plan:     Right shoulder x-ray findings noted.  Fractures appear to be small.  Will place right upper extremity in sling.  Continue PRN oral narcotic regimen  as ordered.  Will asked orthopedic surgery to evaluate for official recommendations.  Patient would be a poor  surgical candidate given comorbidities.        Sitter has been discontinued.  Some confusion persists but much better than previous  Labs from this morning pending.   Continue lactulose 30 g three times a day  Continue rifaximin 550 mg twice daily  Continue daily folic acid and p.o thiamine replacement  Continue Levemir 10 units daily with moderate dose SSI per protocol    Appreciate psychiatry recommendation   -> BuSpar adjusted to 15 mg twice daily   -> Continue Zoloft increased to 50 mg daily  Will not need inpatient psychiatric care    Disposition pending PT/OT evaluation  CBC, CMP in a.m.    Discussed with RN and Dr Ortega.    DVT prophylaxis:  Mechanical DVT prophylaxis orders are present.    CODE STATUS:   Code Status (Patient has no pulse and is not breathing): CPR (Attempt to Resuscitate)  Medical Interventions (Patient has pulse or is breathing): Full Support  Electronically signed by Ron Strong DO, 06/02/22, 11:13 AM EDT.

## 2022-06-03 LAB
ALBUMIN SERPL-MCNC: 2.7 G/DL (ref 3.5–5.2)
ALBUMIN/GLOB SERPL: 0.8 G/DL
ALP SERPL-CCNC: 95 U/L (ref 39–117)
ALT SERPL W P-5'-P-CCNC: 27 U/L (ref 1–41)
ANION GAP SERPL CALCULATED.3IONS-SCNC: 12.8 MMOL/L (ref 5–15)
AST SERPL-CCNC: 52 U/L (ref 1–40)
BILIRUB SERPL-MCNC: 2 MG/DL (ref 0–1.2)
BUN SERPL-MCNC: 26 MG/DL (ref 6–20)
BUN/CREAT SERPL: 32.5 (ref 7–25)
CALCIUM SPEC-SCNC: 8.8 MG/DL (ref 8.6–10.5)
CHLORIDE SERPL-SCNC: 105 MMOL/L (ref 98–107)
CO2 SERPL-SCNC: 15.2 MMOL/L (ref 22–29)
CREAT SERPL-MCNC: 0.8 MG/DL (ref 0.76–1.27)
DEPRECATED RDW RBC AUTO: 54.8 FL (ref 37–54)
EGFRCR SERPLBLD CKD-EPI 2021: 103.9 ML/MIN/1.73
ERYTHROCYTE [DISTWIDTH] IN BLOOD BY AUTOMATED COUNT: 15.9 % (ref 12.3–15.4)
GLOBULIN UR ELPH-MCNC: 3.3 GM/DL
GLUCOSE BLDC GLUCOMTR-MCNC: 100 MG/DL (ref 70–99)
GLUCOSE BLDC GLUCOMTR-MCNC: 109 MG/DL (ref 70–99)
GLUCOSE BLDC GLUCOMTR-MCNC: 119 MG/DL (ref 70–99)
GLUCOSE SERPL-MCNC: 147 MG/DL (ref 65–99)
HCT VFR BLD AUTO: 30.5 % (ref 37.5–51)
HGB BLD-MCNC: 10.5 G/DL (ref 13–17.7)
MCH RBC QN AUTO: 32.6 PG (ref 26.6–33)
MCHC RBC AUTO-ENTMCNC: 34.4 G/DL (ref 31.5–35.7)
MCV RBC AUTO: 94.7 FL (ref 79–97)
PLATELET # BLD AUTO: 109 10*3/MM3 (ref 140–450)
PMV BLD AUTO: 10.4 FL (ref 6–12)
POTASSIUM SERPL-SCNC: 4.9 MMOL/L (ref 3.5–5.2)
PROT SERPL-MCNC: 6 G/DL (ref 6–8.5)
RBC # BLD AUTO: 3.22 10*6/MM3 (ref 4.14–5.8)
SODIUM SERPL-SCNC: 133 MMOL/L (ref 136–145)
WBC NRBC COR # BLD: 7.37 10*3/MM3 (ref 3.4–10.8)

## 2022-06-03 PROCEDURE — 80053 COMPREHEN METABOLIC PANEL: CPT | Performed by: INTERNAL MEDICINE

## 2022-06-03 PROCEDURE — 63710000001 INSULIN DETEMIR PER 5 UNITS: Performed by: FAMILY MEDICINE

## 2022-06-03 PROCEDURE — 82962 GLUCOSE BLOOD TEST: CPT

## 2022-06-03 PROCEDURE — 85027 COMPLETE CBC AUTOMATED: CPT | Performed by: INTERNAL MEDICINE

## 2022-06-03 PROCEDURE — 94799 UNLISTED PULMONARY SVC/PX: CPT

## 2022-06-03 PROCEDURE — 99232 SBSQ HOSP IP/OBS MODERATE 35: CPT | Performed by: INTERNAL MEDICINE

## 2022-06-03 RX ORDER — ATORVASTATIN CALCIUM 40 MG/1
40 TABLET, FILM COATED ORAL NIGHTLY
Status: DISCONTINUED | OUTPATIENT
Start: 2022-06-03 | End: 2022-06-17 | Stop reason: HOSPADM

## 2022-06-03 RX ORDER — SPIRONOLACTONE 25 MG/1
100 TABLET ORAL DAILY
Status: DISCONTINUED | OUTPATIENT
Start: 2022-06-03 | End: 2022-06-17 | Stop reason: HOSPADM

## 2022-06-03 RX ORDER — FUROSEMIDE 40 MG/1
40 TABLET ORAL 2 TIMES DAILY
Status: DISCONTINUED | OUTPATIENT
Start: 2022-06-03 | End: 2022-06-16

## 2022-06-03 RX ORDER — HYDROXYZINE HYDROCHLORIDE 25 MG/1
25 TABLET, FILM COATED ORAL 3 TIMES DAILY PRN
Status: DISCONTINUED | OUTPATIENT
Start: 2022-06-03 | End: 2022-06-05

## 2022-06-03 RX ADMIN — BUDESONIDE 0.5 MG: 0.5 SUSPENSION RESPIRATORY (INHALATION) at 20:18

## 2022-06-03 RX ADMIN — Medication 100 MG: at 09:35

## 2022-06-03 RX ADMIN — FLUTICASONE PROPIONATE 2 SPRAY: 50 SPRAY, METERED NASAL at 09:38

## 2022-06-03 RX ADMIN — BUSPIRONE HYDROCHLORIDE 15 MG: 15 TABLET ORAL at 19:52

## 2022-06-03 RX ADMIN — OXYCODONE HYDROCHLORIDE 5 MG: 5 TABLET ORAL at 19:14

## 2022-06-03 RX ADMIN — BUDESONIDE 0.5 MG: 0.5 SUSPENSION RESPIRATORY (INHALATION) at 12:24

## 2022-06-03 RX ADMIN — Medication 10 ML: at 09:36

## 2022-06-03 RX ADMIN — FOLIC ACID 1 MG: 1 TABLET ORAL at 09:35

## 2022-06-03 RX ADMIN — HYDROXYZINE HYDROCHLORIDE 25 MG: 25 TABLET, FILM COATED ORAL at 18:05

## 2022-06-03 RX ADMIN — LACTULOSE 30 G: 20 SOLUTION ORAL at 11:57

## 2022-06-03 RX ADMIN — FAMOTIDINE 20 MG: 20 TABLET ORAL at 06:48

## 2022-06-03 RX ADMIN — FUROSEMIDE 40 MG: 40 TABLET ORAL at 11:57

## 2022-06-03 RX ADMIN — FAMOTIDINE 20 MG: 20 TABLET ORAL at 17:15

## 2022-06-03 RX ADMIN — SERTRALINE HYDROCHLORIDE 50 MG: 50 TABLET ORAL at 09:36

## 2022-06-03 RX ADMIN — OXYCODONE HYDROCHLORIDE 5 MG: 5 TABLET ORAL at 06:48

## 2022-06-03 RX ADMIN — INSULIN DETEMIR 10 UNITS: 100 INJECTION, SOLUTION SUBCUTANEOUS at 09:38

## 2022-06-03 RX ADMIN — RIFAXIMIN 550 MG: 550 TABLET ORAL at 19:52

## 2022-06-03 RX ADMIN — OXYCODONE HYDROCHLORIDE 5 MG: 5 TABLET ORAL at 10:57

## 2022-06-03 RX ADMIN — FUROSEMIDE 40 MG: 40 TABLET ORAL at 19:52

## 2022-06-03 RX ADMIN — OXYCODONE HYDROCHLORIDE 5 MG: 5 TABLET ORAL at 23:20

## 2022-06-03 RX ADMIN — SPIRONOLACTONE 100 MG: 25 TABLET ORAL at 11:57

## 2022-06-03 RX ADMIN — LACTULOSE 30 G: 20 SOLUTION ORAL at 06:48

## 2022-06-03 RX ADMIN — LACTULOSE 30 G: 20 SOLUTION ORAL at 17:15

## 2022-06-03 RX ADMIN — BUSPIRONE HYDROCHLORIDE 15 MG: 15 TABLET ORAL at 09:37

## 2022-06-03 RX ADMIN — OXYCODONE HYDROCHLORIDE 5 MG: 5 TABLET ORAL at 15:08

## 2022-06-03 RX ADMIN — RIFAXIMIN 550 MG: 550 TABLET ORAL at 09:36
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
## 2022-06-03 NOTE — PROGRESS NOTES
[facility]   Hospitalist Progress Note  Date: 6/3/2022  Patient Name: Nic Nicolas  : 1965  MRN: [id number]  Date of admission: 2022      Subjective   Subjective     Chief Complaint: Follow up for altered mental status    Interval Followup: No issues overnight.  Vital stable.  Patient doing well today.  Oriented, speech clear and remembered me.  Tolerating diet.  Denies uncontrolled pain in the right shoulder.  He is compliant with the sling.  Working with PT.  No new complaints.    Review of Systems  Respiratory:  No Cough, No Dyspnea  Gastrointestinal:  No Nausea, No Vomiting, No Constipation  Musculoskeletal:  Denies uncontrolled right shoulder pain  Neuro:  No Confusion, + generalized weakness    Objective   Objective     Vitals:   Temp:  [97.3 °F (36.3 °C)-98.4 °F (36.9 °C)] 97.9 °F (36.6 °C)  Heart Rate:  [76-94] 85  Resp:  [18-20] 18  BP: (105-170)/(50-73) 151/68  Physical Exam    Constitutional:  male, up in chair, no acute distress   Eyes: Pupils equal and reactive, no conjunctival injection   HENT: NCAT, MMM, nares patent   Neck: Supple, trachea midline   Respiratory: Clear to auscultation bilaterally, nonlabored respirations    Cardiovascular: RRR, no murmurs, trace pedal edema   Gastrointestinal: Positive bowel sounds, soft, nontender, some distention   Musculoskeletal: No gross joint deformity.  RUE in sling. No clubbing or cyanosis to extremities   Neurologic: Alert, oriented x 3, Cranial Nerves grossly intact, no focal deficit   Skin: Warm and dry, no rashes     Result Review    Result Review:  I have personally reviewed the results from the time of this admission to 6/3/2022 11:31 EDT and agree with these findings:  [x]  Laboratory  CBC    CBC 22   WBC 2.90 (A) 2.98 (A) 7.60   RBC 2.99 (A) 2.64 (A) 3.12 (A)   Hemoglobin 9.7 (A) 8.6 (A) 10.3 (A)   Hematocrit 28.9 (A) 25.0 (A) 29.2 (A)   MCV 96.7 94.7 93.6   MCH 32.4 32.6 33.0   MCHC 33.6 34.4 35.3    RDW 14.5 14.9 15.9 (A)   Platelets 56 (A) 47 (A) 86 (A)   (A) Abnormal value            BMP    BMP 5/30/22 6/1/22 6/2/22   BUN 26 (A) 21 (A) 28 (A)   Creatinine 0.80 0.71 (A) 1.03   Sodium 136 134 (A) 133 (A)   Potassium 4.0 4.2 4.5   Chloride 106 106 104   CO2 23.9 20.5 (A) 18.8 (A)   Calcium 8.6 8.3 (A) 9.0   (A) Abnormal value              [x]  Microbiology blood cultures NGTD  []  Radiology  []  EKG/Telemetry   []  Cardiology/Vascular   []  Pathology  []  Old records  [x]  Other:     Intake/Output Summary (Last 24 hours) at 6/3/2022 1131  Last data filed at 6/3/2022 0901  Gross per 24 hour   Intake 1458 ml   Output --   Net 1458 ml         Assessment & Plan   Assessment / Plan     Assessment:  Minimally comminuted nondisplaced fracture of the greater tuberosity of the proximal humerus.  Minimally displaced fracture at the inferior glenoid.  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  Right shoulder pain with decreased ROM  Hepatic encephalopathy -> resolved  ROMO cirrhosis  Pancytopenia likely related to cirrhosis  Insulin-dependent diabetes mellitus - blood sugars controlled  Obstructive sleep apnea not on home CPAP  History of traumatic brain injury  Mild hyperbilirubinemia  Major depression with suicidal ideation  Acute Respiratory Alkalosis , resolved  Thrombocytopenia   Dyslipidemia  Acute kidney injury secondary to prerenal etiology, resolved      Plan:   · Appreciate orthopedic surgery recommendations.  Conservative management.  Continue oxycodone 5 mg q.4 hours PRN.  Continue sling.  Continue PT/OT.  2-week follow-up with orthopedics.  · Continue lactulose 30 g three times a day  · Continue rifaximin 550 mg twice daily  · Continue daily folic acid and p.o thiamine replacement  · Creatinine stable.  Restart p.o. furosemide 40 mg twice daily and spironolactone 100 mg daily  · Continue Levemir 10 units daily with moderate dose SSI per protocol  · Continue BuSpar 15 mg twice daily and Zoloft 50 mg  daily  · Restart atorvastatin 40 mg daily  · Cell counts stable/improved-> monitor CBC intermittently    Patient appears medically stable for discharge.  Disposition: Inpatient rehab pending bed acceptance    Discussed with RN     DVT prophylaxis:  Mechanical DVT prophylaxis orders are present.    CODE STATUS:   Code Status (Patient has no pulse and is not breathing): CPR (Attempt to Resuscitate)  Medical Interventions (Patient has pulse or is breathing): Full Support  Electronically signed by Ron Strong DO, 06/02/22, 11:13 AM EDT.

## 2022-06-03 NOTE — PLAN OF CARE
Goal Outcome Evaluation:              Outcome Evaluation: Paitnet alert and oriented with disorenation to situation and time. Patient forgetfull and towards the end of shift had gotten increaslingly anxious. MD prescribed atarax. Patient broke the lenses out of his glasses, but was not harmed. Stated he did it because ' he was so mad' but when asked why he was mad he often refers to not seeing his daughter or wanting to take care of his finiance. Has several moments of flight of ideas and manic periods it seems with his behavior. Patient is compliant with medication regimine and has had stable VSS throughout shift. Daughter Nandini updated. Patient has also not voiced any suicidal ideation when asked and states he dosn't remeber ever being suicidal in the first place to have had a watch. MD notified.

## 2022-06-03 NOTE — PLAN OF CARE
Goal Outcome Evaluation:  Plan of Care Reviewed With: patient        Progress: no change  Outcome Evaluation: Patient disoriented to time and situation. Vitals have been stable. Getting up x1 to stand-by assistance to bathroom as well as walking around room. Open, bleeding spot on left posterior lower leg - cleansed, dressing applied, and wound care consulted. Prn pain meds for right shoulder pain. Prn simethicone for gas relief. No apparent needs at this time.
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
## 2022-06-04 LAB
ANION GAP SERPL CALCULATED.3IONS-SCNC: 12.3 MMOL/L (ref 5–15)
BUN SERPL-MCNC: 26 MG/DL (ref 6–20)
BUN/CREAT SERPL: 29.2 (ref 7–25)
CALCIUM SPEC-SCNC: 9.1 MG/DL (ref 8.6–10.5)
CHLORIDE SERPL-SCNC: 101 MMOL/L (ref 98–107)
CO2 SERPL-SCNC: 18.7 MMOL/L (ref 22–29)
CREAT SERPL-MCNC: 0.89 MG/DL (ref 0.76–1.27)
DEPRECATED RDW RBC AUTO: 52.7 FL (ref 37–54)
EGFRCR SERPLBLD CKD-EPI 2021: 100.6 ML/MIN/1.73
ERYTHROCYTE [DISTWIDTH] IN BLOOD BY AUTOMATED COUNT: 15.6 % (ref 12.3–15.4)
GLUCOSE BLDC GLUCOMTR-MCNC: 116 MG/DL (ref 70–99)
GLUCOSE BLDC GLUCOMTR-MCNC: 134 MG/DL (ref 70–99)
GLUCOSE BLDC GLUCOMTR-MCNC: 93 MG/DL (ref 70–99)
GLUCOSE SERPL-MCNC: 107 MG/DL (ref 65–99)
HCT VFR BLD AUTO: 30.1 % (ref 37.5–51)
HGB BLD-MCNC: 10.5 G/DL (ref 13–17.7)
MCH RBC QN AUTO: 32.5 PG (ref 26.6–33)
MCHC RBC AUTO-ENTMCNC: 34.9 G/DL (ref 31.5–35.7)
MCV RBC AUTO: 93.2 FL (ref 79–97)
PLATELET # BLD AUTO: 103 10*3/MM3 (ref 140–450)
PMV BLD AUTO: 10.5 FL (ref 6–12)
POTASSIUM SERPL-SCNC: 4.5 MMOL/L (ref 3.5–5.2)
RBC # BLD AUTO: 3.23 10*6/MM3 (ref 4.14–5.8)
SODIUM SERPL-SCNC: 132 MMOL/L (ref 136–145)
WBC NRBC COR # BLD: 7.45 10*3/MM3 (ref 3.4–10.8)

## 2022-06-04 PROCEDURE — 82962 GLUCOSE BLOOD TEST: CPT

## 2022-06-04 PROCEDURE — 94799 UNLISTED PULMONARY SVC/PX: CPT

## 2022-06-04 PROCEDURE — 80048 BASIC METABOLIC PNL TOTAL CA: CPT | Performed by: INTERNAL MEDICINE

## 2022-06-04 PROCEDURE — 99232 SBSQ HOSP IP/OBS MODERATE 35: CPT | Performed by: INTERNAL MEDICINE

## 2022-06-04 PROCEDURE — 94664 DEMO&/EVAL PT USE INHALER: CPT

## 2022-06-04 PROCEDURE — 85027 COMPLETE CBC AUTOMATED: CPT | Performed by: INTERNAL MEDICINE

## 2022-06-04 PROCEDURE — 63710000001 INSULIN DETEMIR PER 5 UNITS: Performed by: FAMILY MEDICINE

## 2022-06-04 RX ORDER — QUETIAPINE FUMARATE 25 MG/1
25 TABLET, FILM COATED ORAL EVERY 8 HOURS PRN
Status: DISCONTINUED | OUTPATIENT
Start: 2022-06-04 | End: 2022-06-05

## 2022-06-04 RX ADMIN — FOLIC ACID 1 MG: 1 TABLET ORAL at 08:34

## 2022-06-04 RX ADMIN — QUETIAPINE FUMARATE 25 MG: 25 TABLET ORAL at 12:49

## 2022-06-04 RX ADMIN — FUROSEMIDE 40 MG: 40 TABLET ORAL at 21:31

## 2022-06-04 RX ADMIN — HYDROXYZINE HYDROCHLORIDE 25 MG: 25 TABLET, FILM COATED ORAL at 03:54

## 2022-06-04 RX ADMIN — LACTULOSE 30 G: 20 SOLUTION ORAL at 17:36

## 2022-06-04 RX ADMIN — OXYCODONE HYDROCHLORIDE 5 MG: 5 TABLET ORAL at 17:36

## 2022-06-04 RX ADMIN — QUETIAPINE FUMARATE 25 MG: 25 TABLET ORAL at 21:31

## 2022-06-04 RX ADMIN — BUDESONIDE 0.5 MG: 0.5 SUSPENSION RESPIRATORY (INHALATION) at 19:00

## 2022-06-04 RX ADMIN — FAMOTIDINE 20 MG: 20 TABLET ORAL at 17:36

## 2022-06-04 RX ADMIN — FUROSEMIDE 40 MG: 40 TABLET ORAL at 08:34

## 2022-06-04 RX ADMIN — BUSPIRONE HYDROCHLORIDE 15 MG: 15 TABLET ORAL at 08:34

## 2022-06-04 RX ADMIN — Medication 10 ML: at 08:34

## 2022-06-04 RX ADMIN — RIFAXIMIN 550 MG: 550 TABLET ORAL at 21:31

## 2022-06-04 RX ADMIN — Medication 100 MG: at 08:34

## 2022-06-04 RX ADMIN — OXYCODONE HYDROCHLORIDE 5 MG: 5 TABLET ORAL at 08:34

## 2022-06-04 RX ADMIN — SPIRONOLACTONE 100 MG: 25 TABLET ORAL at 08:33

## 2022-06-04 RX ADMIN — OXYCODONE HYDROCHLORIDE 5 MG: 5 TABLET ORAL at 03:54

## 2022-06-04 RX ADMIN — LACTULOSE 30 G: 20 SOLUTION ORAL at 08:33

## 2022-06-04 RX ADMIN — LACTULOSE 30 G: 20 SOLUTION ORAL at 12:47

## 2022-06-04 RX ADMIN — SERTRALINE HYDROCHLORIDE 50 MG: 50 TABLET ORAL at 08:34

## 2022-06-04 RX ADMIN — BUDESONIDE 0.5 MG: 0.5 SUSPENSION RESPIRATORY (INHALATION) at 08:26

## 2022-06-04 RX ADMIN — ATORVASTATIN CALCIUM 40 MG: 40 TABLET, FILM COATED ORAL at 21:31

## 2022-06-04 RX ADMIN — OXYCODONE HYDROCHLORIDE 5 MG: 5 TABLET ORAL at 21:31

## 2022-06-04 RX ADMIN — FAMOTIDINE 20 MG: 20 TABLET ORAL at 06:30

## 2022-06-04 RX ADMIN — RIFAXIMIN 550 MG: 550 TABLET ORAL at 08:34

## 2022-06-04 RX ADMIN — OXYCODONE HYDROCHLORIDE 5 MG: 5 TABLET ORAL at 12:47

## 2022-06-04 RX ADMIN — INSULIN DETEMIR 10 UNITS: 100 INJECTION, SOLUTION SUBCUTANEOUS at 08:34

## 2022-06-04 RX ADMIN — BUSPIRONE HYDROCHLORIDE 15 MG: 15 TABLET ORAL at 21:31

## 2022-06-04 NOTE — PLAN OF CARE
"Goal Outcome Evaluation:              Outcome Evaluation: Pt awake throughout entire night shift. Paced the floor frequently and requesting staffs presence. Patient stating he wishes to be discharged today has his POA coming to floor to visit. Patient states he is frustrated several times throughout the shift \"I'm just mad\". When asked for further clarification was unable to provide and would follow-up requesting prn pain medication for generalized pain. Patient is unable to maintain attention for extended periods of time and often does not complete sentances and begins to move about in his room interacting with miscleaneous objects.  "

## 2022-06-04 NOTE — PROGRESS NOTES
[facility]   Hospitalist Progress Note  Date: 2022  Patient Name: Nic Nicolas  : 1965  MRN: [id number]  Date of admission: 2022      Subjective   Subjective     Chief Complaint: Follow up for altered mental status    Interval Followup: No events overnight.  Vitals remained stable.  Patient seems to be doing well this morning.  Tolerating oral intake.  Having regular bowel movements.  No acute complaints.  Not wanting to wear sling.  RN reports intermittent episodes of agitation but is easily redirectable.  Family at bedside; discussed disposition and that we are trying to get him a rehab bed    Review of Systems  Respiratory:  No Cough, No Dyspnea  Gastrointestinal:  No Nausea, No Vomiting, No Constipation  Musculoskeletal:  Denies uncontrolled right shoulder pain    Objective   Objective     Vitals:   Temp:  [97.2 °F (36.2 °C)-98.5 °F (36.9 °C)] 98.1 °F (36.7 °C)  Heart Rate:  [80-90] 86  Resp:  [18-22] 20  BP: (107-144)/(51-80) 121/51  Physical Exam    Constitutional:  male, up in chair, no acute distress   Eyes: Pupils equal and reactive, no conjunctival injection   HENT: NCAT, MMM, nares patent   Neck: Supple, trachea midline   Respiratory: Clear to auscultation bilaterally, nonlabored respirations    Cardiovascular: RRR, no murmurs, trace pedal edema   Gastrointestinal: Positive bowel sounds, soft, nontender, some distention   Musculoskeletal: No gross joint deformity.No clubbing or cyanosis to extremities   Neurologic: Alert, Cranial Nerves grossly intact, no focal deficit   Skin: Warm and dry, no rashes     Result Review    Result Review:  I have personally reviewed the results from the time of this admission to 2022 12:34 EDT and agree with these findings:  [x]  Laboratory  CBC    CBC 6/2/22 6/3/22 6/4/22   WBC 7.60 7.37 7.45   RBC 3.12 (A) 3.22 (A) 3.23 (A)   Hemoglobin 10.3 (A) 10.5 (A) 10.5 (A)   Hematocrit 29.2 (A) 30.5 (A) 30.1 (A)   MCV 93.6 94.7 93.2   MCH 33.0  32.6 32.5   MCHC 35.3 34.4 34.9   RDW 15.9 (A) 15.9 (A) 15.6 (A)   Platelets 86 (A) 109 (A) 103 (A)   (A) Abnormal value            BMP    BMP 6/2/22 6/3/22 6/4/22   BUN 28 (A) 26 (A) 26 (A)   Creatinine 1.03 0.80 0.89   Sodium 133 (A) 133 (A) 132 (A)   Potassium 4.5 4.9 4.5   Chloride 104 105 101   CO2 18.8 (A) 15.2 (A) 18.7 (A)   Calcium 9.0 8.8 9.1   (A) Abnormal value       Comments are available for some flowsheets but are not being displayed.           [x]  Microbiology   []  Radiology  []  EKG/Telemetry   []  Cardiology/Vascular   []  Pathology  []  Old records  [x]  Other:     Intake/Output Summary (Last 24 hours) at 6/4/2022 1234  Last data filed at 6/4/2022 0856  Gross per 24 hour   Intake 960 ml   Output --   Net 960 ml         Assessment & Plan   Assessment / Plan     Assessment:  Minimally comminuted nondisplaced fracture of the greater tuberosity of the proximal humerus.  Minimally displaced fracture at the inferior glenoid.  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  Right shoulder pain with decreased ROM  Hepatic encephalopathy -> resolved  ROMO cirrhosis  Pancytopenia likely related to cirrhosis  Insulin-dependent diabetes mellitus - blood sugars controlled  Obstructive sleep apnea not on home CPAP  History of traumatic brain injury  Mild hyperbilirubinemia  Major depression with suicidal ideation  Acute Respiratory Alkalosis , resolved  Thrombocytopenia   Dyslipidemia  Acute kidney injury secondary to prerenal etiology, resolved      Plan:   · Start p.o. Seroquel 25 mg q8 hours PRN agitation  · Continue lactulose 30 g three times a day and rifaximin 550 mg twice daily  · Continue daily folic acid and p.o thiamine replacement  · Creatinine stable.  Continue p.o. furosemide 40 mg twice daily and spironolactone 100 mg daily  · Continue Levemir 10 units daily with moderate dose SSI per protocol  · Continue BuSpar 15 mg twice daily and Zoloft 50 mg daily  · Continue atorvastatin 40 mg daily  · Cell counts  stable/improved-> monitor CBC intermittently  · Continue oxycodone 5 mg q.4 hours PRN.  Sling recommended but refusing. Continue PT/OT  Discharge to rehab pending bed availability    Discussed with RN     DVT prophylaxis:  Mechanical DVT prophylaxis orders are present.    CODE STATUS:   Code Status (Patient has no pulse and is not breathing): CPR (Attempt to Resuscitate)  Medical Interventions (Patient has pulse or is breathing): Full Support  Electronically signed by Ron Strong DO, 06/04/22, 12:34 PM EDT.

## 2022-06-04 NOTE — PLAN OF CARE
Goal Outcome Evaluation:  Plan of Care Reviewed With: patient        Progress: no change  Outcome Evaluation: Patient alert and oriented x4 with periods of confusion. Paced the floor and his room frequently. Has been cooperative and with no periods of agitation throughout the shift. Does not complete sentences, flight of ideas. Medicated for generalized pain per MAR. Up ad myrtle. Waiting for placement.

## 2022-06-05 LAB
GLUCOSE BLDC GLUCOMTR-MCNC: 117 MG/DL (ref 70–99)
GLUCOSE BLDC GLUCOMTR-MCNC: 148 MG/DL (ref 70–99)
GLUCOSE BLDC GLUCOMTR-MCNC: 155 MG/DL (ref 70–99)

## 2022-06-05 PROCEDURE — 63710000001 INSULIN LISPRO (HUMAN) PER 5 UNITS: Performed by: FAMILY MEDICINE

## 2022-06-05 PROCEDURE — 82962 GLUCOSE BLOOD TEST: CPT

## 2022-06-05 PROCEDURE — 25010000002 MORPHINE PER 10 MG: Performed by: INTERNAL MEDICINE

## 2022-06-05 PROCEDURE — 94799 UNLISTED PULMONARY SVC/PX: CPT

## 2022-06-05 PROCEDURE — 63710000001 INSULIN DETEMIR PER 5 UNITS: Performed by: FAMILY MEDICINE

## 2022-06-05 PROCEDURE — 99233 SBSQ HOSP IP/OBS HIGH 50: CPT | Performed by: INTERNAL MEDICINE

## 2022-06-05 RX ORDER — HALOPERIDOL 5 MG/ML
2 INJECTION INTRAMUSCULAR EVERY 8 HOURS PRN
Status: DISCONTINUED | OUTPATIENT
Start: 2022-06-05 | End: 2022-06-17 | Stop reason: HOSPADM

## 2022-06-05 RX ORDER — OXYCODONE HYDROCHLORIDE 5 MG/1
5 TABLET ORAL ONCE
Status: COMPLETED | OUTPATIENT
Start: 2022-06-05 | End: 2022-06-05

## 2022-06-05 RX ORDER — MORPHINE SULFATE 2 MG/ML
2 INJECTION, SOLUTION INTRAMUSCULAR; INTRAVENOUS
Status: DISCONTINUED | OUTPATIENT
Start: 2022-06-05 | End: 2022-06-08

## 2022-06-05 RX ORDER — LACTULOSE 10 G/15ML
30 SOLUTION ORAL ONCE
Status: COMPLETED | OUTPATIENT
Start: 2022-06-05 | End: 2022-06-05

## 2022-06-05 RX ORDER — QUETIAPINE FUMARATE 25 MG/1
25 TABLET, FILM COATED ORAL ONCE
Status: DISCONTINUED | OUTPATIENT
Start: 2022-06-05 | End: 2022-06-17 | Stop reason: HOSPADM

## 2022-06-05 RX ORDER — QUETIAPINE FUMARATE 25 MG/1
50 TABLET, FILM COATED ORAL EVERY 8 HOURS PRN
Status: DISCONTINUED | OUTPATIENT
Start: 2022-06-05 | End: 2022-06-17 | Stop reason: HOSPADM

## 2022-06-05 RX ORDER — MORPHINE SULFATE 2 MG/ML
2 INJECTION, SOLUTION INTRAMUSCULAR; INTRAVENOUS ONCE
Status: COMPLETED | OUTPATIENT
Start: 2022-06-05 | End: 2022-06-05

## 2022-06-05 RX ORDER — OXYCODONE HYDROCHLORIDE 5 MG/1
10 TABLET ORAL EVERY 4 HOURS PRN
Status: DISCONTINUED | OUTPATIENT
Start: 2022-06-05 | End: 2022-06-08

## 2022-06-05 RX ADMIN — Medication 100 MG: at 08:07

## 2022-06-05 RX ADMIN — FAMOTIDINE 20 MG: 20 TABLET ORAL at 08:08

## 2022-06-05 RX ADMIN — INSULIN DETEMIR 10 UNITS: 100 INJECTION, SOLUTION SUBCUTANEOUS at 08:09

## 2022-06-05 RX ADMIN — RIFAXIMIN 550 MG: 550 TABLET ORAL at 21:05

## 2022-06-05 RX ADMIN — INSULIN LISPRO 2 UNITS: 100 INJECTION, SOLUTION INTRAVENOUS; SUBCUTANEOUS at 11:43

## 2022-06-05 RX ADMIN — LACTULOSE 30 G: 20 SOLUTION ORAL at 21:05

## 2022-06-05 RX ADMIN — Medication 10 ML: at 08:07

## 2022-06-05 RX ADMIN — OXYCODONE HYDROCHLORIDE 5 MG: 5 TABLET ORAL at 10:15

## 2022-06-05 RX ADMIN — Medication 10 ML: at 21:07

## 2022-06-05 RX ADMIN — BUSPIRONE HYDROCHLORIDE 15 MG: 15 TABLET ORAL at 08:07

## 2022-06-05 RX ADMIN — OXYCODONE HYDROCHLORIDE 5 MG: 5 TABLET ORAL at 01:31

## 2022-06-05 RX ADMIN — LACTULOSE 30 G: 20 SOLUTION ORAL at 08:07

## 2022-06-05 RX ADMIN — BUSPIRONE HYDROCHLORIDE 15 MG: 15 TABLET ORAL at 21:06

## 2022-06-05 RX ADMIN — MORPHINE SULFATE 2 MG: 2 INJECTION, SOLUTION INTRAMUSCULAR; INTRAVENOUS at 15:05

## 2022-06-05 RX ADMIN — MORPHINE SULFATE 2 MG: 2 INJECTION, SOLUTION INTRAMUSCULAR; INTRAVENOUS at 21:06

## 2022-06-05 RX ADMIN — QUETIAPINE FUMARATE 25 MG: 25 TABLET ORAL at 10:16

## 2022-06-05 RX ADMIN — FOLIC ACID 1 MG: 1 TABLET ORAL at 08:07

## 2022-06-05 RX ADMIN — LACTULOSE 30 G: 20 SOLUTION ORAL at 17:33

## 2022-06-05 RX ADMIN — RIFAXIMIN 550 MG: 550 TABLET ORAL at 08:07

## 2022-06-05 RX ADMIN — MORPHINE SULFATE 2 MG: 2 INJECTION, SOLUTION INTRAMUSCULAR; INTRAVENOUS at 11:42

## 2022-06-05 RX ADMIN — FUROSEMIDE 40 MG: 40 TABLET ORAL at 08:07

## 2022-06-05 RX ADMIN — SERTRALINE HYDROCHLORIDE 50 MG: 50 TABLET ORAL at 08:07

## 2022-06-05 RX ADMIN — OXYCODONE HYDROCHLORIDE 5 MG: 5 TABLET ORAL at 08:07

## 2022-06-05 RX ADMIN — LACTULOSE 30 G: 20 SOLUTION ORAL at 11:43

## 2022-06-05 RX ADMIN — FAMOTIDINE 20 MG: 20 TABLET ORAL at 17:31

## 2022-06-05 RX ADMIN — FUROSEMIDE 40 MG: 40 TABLET ORAL at 21:05

## 2022-06-05 RX ADMIN — QUETIAPINE FUMARATE 50 MG: 25 TABLET ORAL at 21:05

## 2022-06-05 RX ADMIN — BUDESONIDE 0.5 MG: 0.5 SUSPENSION RESPIRATORY (INHALATION) at 07:09

## 2022-06-05 RX ADMIN — ATORVASTATIN CALCIUM 40 MG: 40 TABLET, FILM COATED ORAL at 21:05

## 2022-06-05 RX ADMIN — SPIRONOLACTONE 100 MG: 25 TABLET ORAL at 08:07

## 2022-06-05 NOTE — PROGRESS NOTES
[facility]   Hospitalist Progress Note  Date: 2022  Patient Name: Nic Nicolas  : 1965  MRN: [id number]  Date of admission: 2022      Subjective   Subjective     Chief Complaint: Follow up for altered mental status    Interval Followup: No issues overnight.  Blood pressure controlled.  Aggravated his right shoulder earlier this morning and complaining of uncontrolled pain, constant, severe, dull, nonradiating, worse with movement, not relieved with Norco 5 mg.    Review of Systems  All systems reviewed and negative unless stated above    Objective   Objective     Vitals:   Temp:  [97.4 °F (36.3 °C)-98.4 °F (36.9 °C)] 98.4 °F (36.9 °C)  Heart Rate:  [69-98] 98  Resp:  [18-20] 18  BP: (113-141)/(48-82) 128/82  Physical Exam    Constitutional:  male, conversant, lying in chair, appears uncomfortable complaining of pain   Eyes: Pupils equal and reactive, no conjunctival injection   HENT: NCAT, MMM, nares patent   Neck: Supple, trachea midline   Respiratory: Clear to auscultation bilaterally, nonlabored respirations    Cardiovascular: RRR, no murmurs, trace pedal edema   Gastrointestinal: Positive bowel sounds, soft, nontender, some distention   Musculoskeletal: No gross joint deformity. RUE in sling. No clubbing or cyanosis to extremities   Neurologic: Alert, Cranial Nerves grossly intact, no focal deficit   Skin: Warm and dry, no rashes     Result Review    Result Review:  I have personally reviewed the results from the time of this admission to 2022 12:54 EDT and agree with these findings:  [x]  Laboratory  CBC    CBC 6/2/22 6/3/22 6/4/22   WBC 7.60 7.37 7.45   RBC 3.12 (A) 3.22 (A) 3.23 (A)   Hemoglobin 10.3 (A) 10.5 (A) 10.5 (A)   Hematocrit 29.2 (A) 30.5 (A) 30.1 (A)   MCV 93.6 94.7 93.2   MCH 33.0 32.6 32.5   MCHC 35.3 34.4 34.9   RDW 15.9 (A) 15.9 (A) 15.6 (A)   Platelets 86 (A) 109 (A) 103 (A)   (A) Abnormal value            BMP    BMP 6/2/22 6/3/22 6/4/22   BUN 28 (A) 26  (A) 26 (A)   Creatinine 1.03 0.80 0.89   Sodium 133 (A) 133 (A) 132 (A)   Potassium 4.5 4.9 4.5   Chloride 104 105 101   CO2 18.8 (A) 15.2 (A) 18.7 (A)   Calcium 9.0 8.8 9.1   (A) Abnormal value       Comments are available for some flowsheets but are not being displayed.           [x]  Microbiology   []  Radiology  []  EKG/Telemetry   []  Cardiology/Vascular   []  Pathology  []  Old records  [x]  Other:     Intake/Output Summary (Last 24 hours) at 6/5/2022 1254  Last data filed at 6/5/2022 0938  Gross per 24 hour   Intake 720 ml   Output --   Net 720 ml         Assessment & Plan   Assessment / Plan     Assessment:  Right shoulder pain, uncontrolled  Minimally comminuted nondisplaced fracture of the greater tuberosity of the proximal humerus.  Minimally displaced fracture at the inferior glenoid.  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  Hepatic encephalopathy -> resolved  ROMO cirrhosis  Pancytopenia likely related to cirrhosis  Insulin-dependent diabetes mellitus - blood sugars controlled  Obstructive sleep apnea not on home CPAP  History of traumatic brain injury  Mild hyperbilirubinemia  Major depression with suicidal ideation  Acute Respiratory Alkalosis , resolved  Thrombocytopenia   Dyslipidemia  Acute kidney injury secondary to prerenal etiology, resolved      Plan:   · Will give one-time dose of IV morphine 2 mg treat severe pain acutely.  Will increase Norco regimen to 10 mg every 4 hours as needed severe pain.  I educated on need to manage pain with oral regimen only.  Emphasized need to remain in right upper extremity sling   · Continue p.o. Seroquel 25 mg q8 hours PRN agitation  · Continue lactulose 30 g three times a day and rifaximin 550 mg twice daily  · Continue daily folic acid and p.o thiamine replacement  · Continue p.o. furosemide 40 mg twice daily and spironolactone 100 mg daily  · Continue Levemir 10 units daily with moderate dose SSI per protocol  · Continue BuSpar 15 mg twice daily and Zoloft  50 mg daily  · Continue atorvastatin 40 mg daily  · Cell counts stable/improved-> monitor CBC intermittently  · Continue PT/OT  Discharge to rehab pending bed availability    Discussed with RN     DVT prophylaxis:  Mechanical DVT prophylaxis orders are present.    CODE STATUS:   Code Status (Patient has no pulse and is not breathing): CPR (Attempt to Resuscitate)  Medical Interventions (Patient has pulse or is breathing): Full Support    Electronically signed by Ron Strong DO, 06/05/22, 12:54 PM EDT.

## 2022-06-06 LAB
AMMONIA BLD-SCNC: 28 UMOL/L (ref 16–60)
ANION GAP SERPL CALCULATED.3IONS-SCNC: 9.5 MMOL/L (ref 5–15)
BUN SERPL-MCNC: 19 MG/DL (ref 6–20)
BUN/CREAT SERPL: 24.4 (ref 7–25)
CALCIUM SPEC-SCNC: 8.9 MG/DL (ref 8.6–10.5)
CHLORIDE SERPL-SCNC: 105 MMOL/L (ref 98–107)
CO2 SERPL-SCNC: 20.5 MMOL/L (ref 22–29)
CREAT SERPL-MCNC: 0.78 MG/DL (ref 0.76–1.27)
DEPRECATED RDW RBC AUTO: 54.1 FL (ref 37–54)
EGFRCR SERPLBLD CKD-EPI 2021: 104.7 ML/MIN/1.73
ERYTHROCYTE [DISTWIDTH] IN BLOOD BY AUTOMATED COUNT: 15.7 % (ref 12.3–15.4)
GLUCOSE BLDC GLUCOMTR-MCNC: 128 MG/DL (ref 70–99)
GLUCOSE BLDC GLUCOMTR-MCNC: 143 MG/DL (ref 70–99)
GLUCOSE BLDC GLUCOMTR-MCNC: 78 MG/DL (ref 70–99)
GLUCOSE SERPL-MCNC: 96 MG/DL (ref 65–99)
HCT VFR BLD AUTO: 26.5 % (ref 37.5–51)
HGB BLD-MCNC: 9.2 G/DL (ref 13–17.7)
MCH RBC QN AUTO: 32.9 PG (ref 26.6–33)
MCHC RBC AUTO-ENTMCNC: 34.7 G/DL (ref 31.5–35.7)
MCV RBC AUTO: 94.6 FL (ref 79–97)
PLATELET # BLD AUTO: 63 10*3/MM3 (ref 140–450)
PMV BLD AUTO: 10.9 FL (ref 6–12)
POTASSIUM SERPL-SCNC: 4.3 MMOL/L (ref 3.5–5.2)
RBC # BLD AUTO: 2.8 10*6/MM3 (ref 4.14–5.8)
SODIUM SERPL-SCNC: 135 MMOL/L (ref 136–145)
WBC NRBC COR # BLD: 3.67 10*3/MM3 (ref 3.4–10.8)

## 2022-06-06 PROCEDURE — 63710000001 INSULIN DETEMIR PER 5 UNITS: Performed by: FAMILY MEDICINE

## 2022-06-06 PROCEDURE — 99233 SBSQ HOSP IP/OBS HIGH 50: CPT | Performed by: INTERNAL MEDICINE

## 2022-06-06 PROCEDURE — 94760 N-INVAS EAR/PLS OXIMETRY 1: CPT

## 2022-06-06 PROCEDURE — 80048 BASIC METABOLIC PNL TOTAL CA: CPT | Performed by: INTERNAL MEDICINE

## 2022-06-06 PROCEDURE — 85027 COMPLETE CBC AUTOMATED: CPT | Performed by: INTERNAL MEDICINE

## 2022-06-06 PROCEDURE — 82140 ASSAY OF AMMONIA: CPT | Performed by: PHYSICIAN ASSISTANT

## 2022-06-06 PROCEDURE — 94799 UNLISTED PULMONARY SVC/PX: CPT

## 2022-06-06 PROCEDURE — 25010000002 MORPHINE PER 10 MG: Performed by: INTERNAL MEDICINE

## 2022-06-06 PROCEDURE — 82962 GLUCOSE BLOOD TEST: CPT

## 2022-06-06 RX ORDER — TRAZODONE HYDROCHLORIDE 50 MG/1
50 TABLET ORAL NIGHTLY PRN
Status: DISCONTINUED | OUTPATIENT
Start: 2022-06-06 | End: 2022-06-17 | Stop reason: HOSPADM

## 2022-06-06 RX ADMIN — SERTRALINE HYDROCHLORIDE 50 MG: 50 TABLET ORAL at 08:06

## 2022-06-06 RX ADMIN — RIFAXIMIN 550 MG: 550 TABLET ORAL at 08:06

## 2022-06-06 RX ADMIN — Medication 10 ML: at 22:00

## 2022-06-06 RX ADMIN — MORPHINE SULFATE 2 MG: 2 INJECTION, SOLUTION INTRAMUSCULAR; INTRAVENOUS at 17:37

## 2022-06-06 RX ADMIN — FOLIC ACID 1 MG: 1 TABLET ORAL at 08:06

## 2022-06-06 RX ADMIN — BUSPIRONE HYDROCHLORIDE 15 MG: 15 TABLET ORAL at 22:00

## 2022-06-06 RX ADMIN — FUROSEMIDE 40 MG: 40 TABLET ORAL at 22:00

## 2022-06-06 RX ADMIN — Medication 10 ML: at 08:08

## 2022-06-06 RX ADMIN — FAMOTIDINE 20 MG: 20 TABLET ORAL at 06:45

## 2022-06-06 RX ADMIN — ATORVASTATIN CALCIUM 40 MG: 40 TABLET, FILM COATED ORAL at 22:00

## 2022-06-06 RX ADMIN — TRAZODONE HYDROCHLORIDE 50 MG: 50 TABLET ORAL at 22:32

## 2022-06-06 RX ADMIN — BUDESONIDE 0.5 MG: 0.5 SUSPENSION RESPIRATORY (INHALATION) at 08:50

## 2022-06-06 RX ADMIN — SPIRONOLACTONE 100 MG: 25 TABLET ORAL at 08:06

## 2022-06-06 RX ADMIN — LACTULOSE 30 G: 20 SOLUTION ORAL at 07:14

## 2022-06-06 RX ADMIN — RIFAXIMIN 550 MG: 550 TABLET ORAL at 22:00

## 2022-06-06 RX ADMIN — MORPHINE SULFATE 2 MG: 2 INJECTION, SOLUTION INTRAMUSCULAR; INTRAVENOUS at 01:38

## 2022-06-06 RX ADMIN — BUDESONIDE 0.5 MG: 0.5 SUSPENSION RESPIRATORY (INHALATION) at 21:33

## 2022-06-06 RX ADMIN — MORPHINE SULFATE 2 MG: 2 INJECTION, SOLUTION INTRAMUSCULAR; INTRAVENOUS at 13:00

## 2022-06-06 RX ADMIN — Medication 100 MG: at 08:06

## 2022-06-06 RX ADMIN — FAMOTIDINE 20 MG: 20 TABLET ORAL at 17:30

## 2022-06-06 RX ADMIN — LACTULOSE 30 G: 20 SOLUTION ORAL at 13:00

## 2022-06-06 RX ADMIN — MORPHINE SULFATE 2 MG: 2 INJECTION, SOLUTION INTRAMUSCULAR; INTRAVENOUS at 07:56

## 2022-06-06 RX ADMIN — QUETIAPINE FUMARATE 50 MG: 25 TABLET ORAL at 22:32

## 2022-06-06 RX ADMIN — FLUTICASONE PROPIONATE 2 SPRAY: 50 SPRAY, METERED NASAL at 08:09

## 2022-06-06 RX ADMIN — SIMETHICONE 80 MG: 80 TABLET, CHEWABLE ORAL at 18:36

## 2022-06-06 RX ADMIN — MORPHINE SULFATE 2 MG: 2 INJECTION, SOLUTION INTRAMUSCULAR; INTRAVENOUS at 22:33

## 2022-06-06 RX ADMIN — QUETIAPINE FUMARATE 50 MG: 25 TABLET ORAL at 13:00

## 2022-06-06 RX ADMIN — FUROSEMIDE 40 MG: 40 TABLET ORAL at 08:07

## 2022-06-06 RX ADMIN — LACTULOSE 30 G: 20 SOLUTION ORAL at 17:30

## 2022-06-06 RX ADMIN — BUSPIRONE HYDROCHLORIDE 15 MG: 15 TABLET ORAL at 08:07

## 2022-06-06 RX ADMIN — INSULIN DETEMIR 10 UNITS: 100 INJECTION, SOLUTION SUBCUTANEOUS at 08:07

## 2022-06-06 NOTE — PAYOR COMM NOTE
"Parish Wright (57 y.o. Male)             Date of Birth   1965    Social Security Number       Address   [address] [place] [address]    Home Phone   [phone]    MRN   [id number]       Druze   Sikh    Marital Status                               Admission Date   5/24/22    Admission Type   Emergency    Admitting Provider   Dioni Jimenez MD    Attending Provider   Ron Strong DO    Department, Room/Bed   [address], 4001/1       Discharge Date       Discharge Disposition       Discharge Destination                               Attending Provider: Ron Strong DO    Allergies: No Known Allergies    Isolation: None   Infection: None   Code Status: CPR   Advance Care Planning Activity    Ht: 182.9 cm (72\")   Wt: 104 kg (229 lb 0.9 oz)    Admission Cmt: None   Principal Problem: None                Active Insurance as of 5/24/2022     Primary Coverage     Payor Plan Insurance Group Employer/Plan Group    [facility] [id number]     Payor Plan Address Payor Plan Phone Number Payor Plan Fax Number Effective Dates    [address]   5/1/2022 - None Entered    [place] [address]       Subscriber Name Subscriber Birth Date Member ID       PARISH WRIGHT 1965 [id number]                 Emergency Contacts      (Rel.) Home Phone Work Phone Mobile Phone    DG MURPHY (Sister) -- -- [phone]    Christo Soria (Friend) [phone] -- --    Nandini Wright (Daughter) -- -- [phone]      #[id number]         CONTACT   LONG S UTILIZATION REVIEW    [facility]  [address] [place] [address]  TAX ID [id number]  NPI  [id number]    PH   [phone]   [phone]              Ron Strogn DO    Physician   Hospitalist   Progress Notes       Signed   Date of Service:  06/05/22 0755   Creation Time:  06/05/22 0755              Signed        Expand All Collapse " All        Show:Clear all  [x]Manual[x]Template[x]Copied    Added by:  [x]Ron Strong DO      []Hans for details     [facility]  Date: 2022  Patient Name: Nic Nicolas  : 1965  MRN: [id number]  Date of admission: 2022           Subjective []Expand by Default     Subjective      Chief Complaint: Follow up for altered mental status     Interval Followup: No issues overnight.  Blood pressure controlled.  Aggravated his right shoulder earlier this morning and complaining of uncontrolled pain, constant, severe, dull, nonradiating, worse with movement, not relieved with Norco 5 mg.     Review of Systems  All systems reviewed and negative unless stated above           Objective []Expand by Default     Objective      Vitals:   Temp:  [97.4 °F (36.3 °C)-98.4 °F (36.9 °C)] 98.4 °F (36.9 °C)  Heart Rate:  [69-98] 98  Resp:  [18-20] 18  BP: (113-141)/(48-82) 128/82  Physical Exam                         Constitutional:  male, conversant, lying in chair, appears uncomfortable complaining of pain              Eyes: Pupils equal and reactive, no conjunctival injection              HENT: NCAT, MMM, nares patent              Neck: Supple, trachea midline              Respiratory: Clear to auscultation bilaterally, nonlabored respirations               Cardiovascular: RRR, no murmurs, trace pedal edema              Gastrointestinal: Positive bowel sounds, soft, nontender, some distention              Musculoskeletal: No gross joint deformity. RUE in sling. No clubbing or cyanosis to extremities              Neurologic: Alert, Cranial Nerves grossly intact, no focal deficit              Skin: Warm and dry, no rashes            Result Review []Expand by Default      Result Review:  I have personally reviewed the results from the time of this admission to 2022 12:54 EDT and agree with these findings:  [x]?  Laboratory  CBC Results         CBC    CBC 6/2/22 6/3/22  6/4/22   WBC 7.60 7.37 7.45   RBC 3.12 (A) 3.22 (A) 3.23 (A)   Hemoglobin 10.3 (A) 10.5 (A) 10.5 (A)   Hematocrit 29.2 (A) 30.5 (A) 30.1 (A)   MCV 93.6 94.7 93.2   MCH 33.0 32.6 32.5   MCHC 35.3 34.4 34.9   RDW 15.9 (A) 15.9 (A) 15.6 (A)   Platelets 86 (A) 109 (A) 103 (A)   (A) Abnormal value                  Basic Metabolic Profile Reults:         BMP    BMP 6/2/22 6/3/22 6/4/22   BUN 28 (A) 26 (A) 26 (A)   Creatinine 1.03 0.80 0.89   Sodium 133 (A) 133 (A) 132 (A)   Potassium 4.5 4.9 4.5   Chloride 104 105 101   CO2 18.8 (A) 15.2 (A) 18.7 (A)   Calcium 9.0 8.8 9.1   (A) Abnormal value        Comments are available for some flowsheets but are not being displayed.                 [x]?  Microbiology   []?  Radiology  []?  EKG/Telemetry   []?  Cardiology/Vascular   []?  Pathology  []?  Old records  [x]?  Other:      Intake/Output Summary (Last 24 hours) at 6/5/2022 1254  Last data filed at 6/5/2022 0938      Gross per 24 hour   Intake 720 ml   Output --   Net 720 ml                  Assessment & Plan     Assessment / Plan      Assessment:  Right shoulder pain, uncontrolled  Minimally comminuted nondisplaced fracture of the greater tuberosity of the proximal humerus.  Minimally displaced fracture at the inferior glenoid.  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  Hepatic encephalopathy -> resolved  ROMO cirrhosis  Pancytopenia likely related to cirrhosis  Insulin-dependent diabetes mellitus - blood sugars controlled  Obstructive sleep apnea not on home CPAP  History of traumatic brain injury  Mild hyperbilirubinemia  Major depression with suicidal ideation  Acute Respiratory Alkalosis , resolved  Thrombocytopenia   Dyslipidemia  Acute kidney injury secondary to prerenal etiology, resolved        Plan:   · Will give one-time dose of IV morphine 2 mg treat severe pain acutely.  Will increase Norco regimen to 10 mg every 4 hours as needed severe pain.  I educated on need to manage pain with oral regimen only.  Emphasized  "need to remain in right upper extremity sling   · Continue p.o. Seroquel 25 mg q8 hours PRN agitation  · Continue lactulose 30 g three times a day and rifaximin 550 mg twice daily  · Continue daily folic acid and p.o thiamine replacement  · Continue p.o. furosemide 40 mg twice daily and spironolactone 100 mg daily  · Continue Levemir 10 units daily with moderate dose SSI per protocol  · Continue BuSpar 15 mg twice daily and Zoloft 50 mg daily  · Continue atorvastatin 40 mg daily  · Cell counts stable/improved-> monitor CBC intermittently  · Continue PT/OT  Discharge to rehab pending bed availability     Discussed with RN      DVT prophylaxis:  Mechanical DVT prophylaxis orders are present.     CODE STATUS:   Code Status (Patient has no pulse and is not breathing): CPR (Attempt to Resuscitate)  Medical Interventions (Patient has pulse or is breathing): Full Support     Electronically signed by Ron Strong DO, 06/05/22, 12:54 PM EDT.                                               Katharine Downing, RN    Registered Nurse   Medicine   Nursing Note       Signed   Date of Service:  06/06/22 0600   Creation Time:  06/06/22 0725              Signed              Show:Clear all  [x]Manual[]Template[]Copied    Added by:  [x]Katharine Downing, RN      [][facility]    PT A&OX4 AND AMBULATORY AT BEGINNING OF SHIFT. TOLERATED LIQUIDS AND HAD CLEAR SPEECH. AS SHIFT PROGRESSED, PT BECAME MORE ADAMANT ABOUT REQUESTS THAT STAFF COULD NOT ACCOMMODATE. PT REQUESTED NO MORE PO PAIN MEDS D/T CONSTIPATING SIDE EFFECTS. PT WAS EDUCATED ON POTENTIAL SIDE EFFECTS OF IV MORPHINE BUT PT STATED HE HAD \"12/10\" PAIN AND THAT WE DID NOT CARE ABOUT HIS WELL-BEING. NOTIFIED PROVIDER SOLEDAD VAUGHN, OF PT REQUEST FOR IV MORPHINE INSTEAD OF PO. PT LATER REQUESTED FOR LACTULOSE TO BE CHANGED TO CLEAR INSTEAD OF ORANGE. PT STATED THAT \"CLEAR GOES DOWN BETTER\" AND THAT PRIMARY NURSE WAS \"CHOKING\" HIM WHEN ADMINISTERING LACTULOSE. PT SPAT " "ON NURSE AND DISPLAYED VERY AGGRESSIVE BEHAVIOR. SECURITY WAS NOTIFIED, AS WELL AS HOSPITALIST.   PT PROCEEDED TO DRINK THIN LIQUIDS, DESPITE REINFORCING EDUCATING ON ASPIRATION RISKS, PER PT'S PREVIOUS CONCERNS. PT LATER STATED HE HAD A SEIZURE. STAFF CAME TO ROOM D/T PT YELLING, \"I'M HAVING A SEIZURE.\" PT WAS FOUND LEANING BACK ON COUCH WITH A PEN IN HIS MOUTH, ALERT AND ORIENTED X4. NO SIGNS OF CONVULSIONS OR EMESIS. VS WNL. PT DECLINED ALL PO MEDS. MD AWARE.                      ++NO MD NOTE YET TODAY++      Physician Progress Notes (last 24 hours)  Notes from 06/05/22 1139 through 06/06/22 1139   No notes of this type exist for this encounter.         "

## 2022-06-06 NOTE — NURSING NOTE
"PT A&OX4 AND AMBULATORY AT BEGINNING OF SHIFT. TOLERATED LIQUIDS AND HAD CLEAR SPEECH. AS SHIFT PROGRESSED, PT BECAME MORE ADAMANT ABOUT REQUESTS THAT STAFF COULD NOT ACCOMMODATE. PT REQUESTED NO MORE PO PAIN MEDS D/T CONSTIPATING SIDE EFFECTS. PT WAS EDUCATED ON POTENTIAL SIDE EFFECTS OF IV MORPHINE BUT PT STATED HE HAD \"12/10\" PAIN AND THAT WE DID NOT CARE ABOUT HIS WELL-BEING. NOTIFIED PROVIDER SOLEDAD VAUGHN, OF PT REQUEST FOR IV MORPHINE INSTEAD OF PO. PT LATER REQUESTED FOR LACTULOSE TO BE CHANGED TO CLEAR INSTEAD OF ORANGE. PT STATED THAT \"CLEAR GOES DOWN BETTER\" AND THAT PRIMARY NURSE WAS \"CHOKING\" HIM WHEN ADMINISTERING LACTULOSE. PT SPAT ON NURSE AND DISPLAYED VERY AGGRESSIVE BEHAVIOR. SECURITY WAS NOTIFIED, AS WELL AS HOSPITALIST.   PT PROCEEDED TO DRINK THIN LIQUIDS, DESPITE REINFORCING EDUCATING ON ASPIRATION RISKS, PER PT'S PREVIOUS CONCERNS. PT LATER STATED HE HAD A SEIZURE. STAFF CAME TO ROOM D/T PT YELLING, \"I'M HAVING A SEIZURE.\" PT WAS FOUND LEANING BACK ON COUCH WITH A PEN IN HIS MOUTH, ALERT AND ORIENTED X4. NO SIGNS OF CONVULSIONS OR EMESIS. VS WNL. PT DECLINED ALL PO MEDS. MD AWARE.  "

## 2022-06-06 NOTE — PROGRESS NOTES
[facility]   Hospitalist Progress Note  Date: 2022  Patient Name: Nic Nicolas  : 1965  MRN: [id number]  Date of admission: 2022      Subjective   Subjective     Chief Complaint: Follow up for altered mental status    Interval Followup: Afebrile.  Blood pressure controlled.  Remains on room air.  Patient remains alert but having intermittent episodes of agitation and restlessness.  Has been refusing some of his oral medications including the pain meds and Seroquel.  He has been requesting IV morphine to treat shoulder pain.  3 bowel movements documented today.  Tolerating oral intake.  No hypoglycemia.  No electrolyte abnormalities.     Review of Systems  Constitutional:  No Fever, No Chills  Respiratory:  No Cough, No Dyspnea  Gastrointestinal:  No Nausea, No Vomiting  Musculoskeletal: Right shoulder pain, dull, constant, worse with movement  10 point system reviewed and negative unless listed above    Objective   Objective     Vitals:   Temp:  [97.9 °F (36.6 °C)-98.6 °F (37 °C)] 98.2 °F (36.8 °C)  Heart Rate:  [72-91] 72  Resp:  [20] 20  BP: (103-137)/(52-81) 128/58  Physical Exam    Constitutional:  male, conversant, up in chair, conversant, does not appear to be in pain   Eyes: Pupils equal and reactive, no conjunctival injection   HENT: NCAT, MMM, nares patent   Neck: Supple, trachea midline   Respiratory: Clear to auscultation bilaterally, nonlabored respirations    Cardiovascular: RRR, no murmurs, trace pedal edema   Gastrointestinal: Positive bowel sounds, soft, nontender   Musculoskeletal: No gross joint deformity. RUE in sling. No clubbing or cyanosis to extremities   Neurologic: A&Ox person/place/time, Cranial Nerves grossly intact, no focal deficit   Skin: Warm and dry, no rashes     Result Review    Result Review:  I have personally reviewed the results from the time of this admission to 2022 18:32 EDT and agree with these findings:  [x]  Laboratory  CBC    CBC  6/3/22 6/4/22 6/6/22   WBC 7.37 7.45 3.67   RBC 3.22 (A) 3.23 (A) 2.80 (A)   Hemoglobin 10.5 (A) 10.5 (A) 9.2 (A)   Hematocrit 30.5 (A) 30.1 (A) 26.5 (A)   MCV 94.7 93.2 94.6   MCH 32.6 32.5 32.9   MCHC 34.4 34.9 34.7   RDW 15.9 (A) 15.6 (A) 15.7 (A)   Platelets 109 (A) 103 (A) 63 (A)   (A) Abnormal value            BMP    BMP 6/3/22 6/4/22 6/6/22   BUN 26 (A) 26 (A) 19   Creatinine 0.80 0.89 0.78   Sodium 133 (A) 132 (A) 135 (A)   Potassium 4.9 4.5 4.3   Chloride 105 101 105   CO2 15.2 (A) 18.7 (A) 20.5 (A)   Calcium 8.8 9.1 8.9   (A) Abnormal value       Comments are available for some flowsheets but are not being displayed.           [x]  Microbiology   []  Radiology  []  EKG/Telemetry   []  Cardiology/Vascular   []  Pathology  []  Old records  [x]  Other:     Intake/Output Summary (Last 24 hours) at 6/6/2022 1832  Last data filed at 6/6/2022 1407  Gross per 24 hour   Intake 720 ml   Output --   Net 720 ml         Assessment & Plan   Assessment / Plan     Assessment:  ?  Hyperactive delirium  Right shoulder pain  Minimally comminuted nondisplaced fracture of the greater tuberosity of the proximal humerus.  Minimally displaced fracture at the inferior glenoid.  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  Hepatic encephalopathy  ROMO cirrhosis  Pancytopenia likely related to cirrhosis  Insulin-dependent diabetes mellitus - blood sugars controlled  Obstructive sleep apnea not on home CPAP  History of traumatic brain injury  Mild hyperbilirubinemia  Major depression with suicidal ideation  Acute Respiratory Alkalosis , resolved  Thrombocytopenia   Dyslipidemia  Acute kidney injury secondary to prerenal etiology, resolved      Plan:   Try to redirect as much as possible.  Avoid benzodiazepines. Avoid sitter and restraints unless patient were to become combative or aggressive to staff.  Delirium precautions ordered.  Continue p.o. Seroquel 50 mg q8 hours PRN agitation. Has IM Haldol 2 mg every 8 hours as needed Seroquel  effective or refusing oral meds.  Continue IV morphine 2 mg every 3 hours as needed for severe pain is uncontrolled with oxycodone regimen.Add trazodone 50 mg as needed nightly if he is unable to sleep.  Do not let sleep during the day  Continue BuSpar 15 mg twice daily and Zoloft 50 mg daily; per psychiatry  Ammonia level 28.  Continue lactulose 30 g three times a day and rifaximin 550 mg twice daily  Continue daily folic acid and p.o thiamine replacement  Creatinine stable.  Continue p.o. furosemide 40 mg twice daily and spironolactone 100 mg daily  Blood sugars controlled -> continue Levemir 10 units daily with moderate dose SSI per protocol  Continue atorvastatin 40 mg daily  Cell counts stable/improved-> monitor intermittently  Working on discharge planning.  Unable to go back home as family is unable to provide support.  Had previously been discharged to long-term care facility however was discharged back home at that time due to insurance issues.  We are waiting to get clarification from LTC facility.  Pearls to other facilities have been sent.    Discussed with RNRANDA     DVT prophylaxis:  Mechanical DVT prophylaxis orders are present.    CODE STATUS:   Code Status (Patient has no pulse and is not breathing): CPR (Attempt to Resuscitate)  Medical Interventions (Patient has pulse or is breathing): Full Support    Electronically signed by Ron Strong DO, 06/06/22, 6:32 PM EDT.

## 2022-06-06 NOTE — PLAN OF CARE
Goal Outcome Evaluation:  Plan of Care Reviewed With: patient        Progress: declining  Outcome Evaluation: Patient is alert and oriented x3 however is disoriented to situation. Patient has had flight of ideas, agitation, suspiciousness, restless, patient can be demanding at times when speaking to staff and frequently seeking out staff, patient has been making loose associations when speaking with RN. Patient has complained of pain throughout shift and has been medicated with PRN Morphine, and has had PRN seroquel for agitation x1. Patient shows no signs or symptoms of physical distress at this time and none are reported to RN, patient has call light within reach and is educated and aware of how to use.

## 2022-06-07 LAB
ANION GAP SERPL CALCULATED.3IONS-SCNC: 9.4 MMOL/L (ref 5–15)
BUN SERPL-MCNC: 16 MG/DL (ref 6–20)
BUN/CREAT SERPL: 27.1 (ref 7–25)
CALCIUM SPEC-SCNC: 8.8 MG/DL (ref 8.6–10.5)
CHLORIDE SERPL-SCNC: 106 MMOL/L (ref 98–107)
CO2 SERPL-SCNC: 20.6 MMOL/L (ref 22–29)
CREAT SERPL-MCNC: 0.59 MG/DL (ref 0.76–1.27)
DEPRECATED RDW RBC AUTO: 55.4 FL (ref 37–54)
EGFRCR SERPLBLD CKD-EPI 2021: 113.9 ML/MIN/1.73
ERYTHROCYTE [DISTWIDTH] IN BLOOD BY AUTOMATED COUNT: 15.9 % (ref 12.3–15.4)
GLUCOSE BLDC GLUCOMTR-MCNC: 126 MG/DL (ref 70–99)
GLUCOSE BLDC GLUCOMTR-MCNC: 197 MG/DL (ref 70–99)
GLUCOSE BLDC GLUCOMTR-MCNC: 99 MG/DL (ref 70–99)
GLUCOSE SERPL-MCNC: 132 MG/DL (ref 65–99)
HCT VFR BLD AUTO: 24.5 % (ref 37.5–51)
HGB BLD-MCNC: 8.4 G/DL (ref 13–17.7)
MCH RBC QN AUTO: 33.1 PG (ref 26.6–33)
MCHC RBC AUTO-ENTMCNC: 34.3 G/DL (ref 31.5–35.7)
MCV RBC AUTO: 96.5 FL (ref 79–97)
PLATELET # BLD AUTO: 49 10*3/MM3 (ref 140–450)
PMV BLD AUTO: 10.6 FL (ref 6–12)
POTASSIUM SERPL-SCNC: 3.9 MMOL/L (ref 3.5–5.2)
RBC # BLD AUTO: 2.54 10*6/MM3 (ref 4.14–5.8)
SODIUM SERPL-SCNC: 136 MMOL/L (ref 136–145)
WBC NRBC COR # BLD: 2.7 10*3/MM3 (ref 3.4–10.8)

## 2022-06-07 PROCEDURE — 85027 COMPLETE CBC AUTOMATED: CPT | Performed by: INTERNAL MEDICINE

## 2022-06-07 PROCEDURE — 25010000002 MORPHINE PER 10 MG: Performed by: INTERNAL MEDICINE

## 2022-06-07 PROCEDURE — 94799 UNLISTED PULMONARY SVC/PX: CPT

## 2022-06-07 PROCEDURE — 80048 BASIC METABOLIC PNL TOTAL CA: CPT | Performed by: INTERNAL MEDICINE

## 2022-06-07 PROCEDURE — 99233 SBSQ HOSP IP/OBS HIGH 50: CPT | Performed by: STUDENT IN AN ORGANIZED HEALTH CARE EDUCATION/TRAINING PROGRAM

## 2022-06-07 PROCEDURE — 63710000001 INSULIN LISPRO (HUMAN) PER 5 UNITS: Performed by: FAMILY MEDICINE

## 2022-06-07 PROCEDURE — 25010000002 ONDANSETRON PER 1 MG: Performed by: HOSPITALIST

## 2022-06-07 PROCEDURE — 63710000001 INSULIN DETEMIR PER 5 UNITS: Performed by: FAMILY MEDICINE

## 2022-06-07 PROCEDURE — 82962 GLUCOSE BLOOD TEST: CPT

## 2022-06-07 RX ADMIN — INSULIN LISPRO 2 UNITS: 100 INJECTION, SOLUTION INTRAVENOUS; SUBCUTANEOUS at 17:25

## 2022-06-07 RX ADMIN — BUDESONIDE 0.5 MG: 0.5 SUSPENSION RESPIRATORY (INHALATION) at 20:11

## 2022-06-07 RX ADMIN — OXYCODONE HYDROCHLORIDE 5 MG: 5 TABLET ORAL at 22:33

## 2022-06-07 RX ADMIN — TRAZODONE HYDROCHLORIDE 50 MG: 50 TABLET ORAL at 21:20

## 2022-06-07 RX ADMIN — FOLIC ACID 1 MG: 1 TABLET ORAL at 09:36

## 2022-06-07 RX ADMIN — SIMETHICONE 80 MG: 80 TABLET, CHEWABLE ORAL at 09:36

## 2022-06-07 RX ADMIN — ONDANSETRON 4 MG: 2 INJECTION INTRAMUSCULAR; INTRAVENOUS at 13:39

## 2022-06-07 RX ADMIN — MORPHINE SULFATE 2 MG: 2 INJECTION, SOLUTION INTRAMUSCULAR; INTRAVENOUS at 21:01

## 2022-06-07 RX ADMIN — SPIRONOLACTONE 100 MG: 25 TABLET ORAL at 09:36

## 2022-06-07 RX ADMIN — OXYCODONE HYDROCHLORIDE 5 MG: 5 TABLET ORAL at 14:01

## 2022-06-07 RX ADMIN — LACTULOSE 30 G: 20 SOLUTION ORAL at 07:09

## 2022-06-07 RX ADMIN — MORPHINE SULFATE 2 MG: 2 INJECTION, SOLUTION INTRAMUSCULAR; INTRAVENOUS at 03:10

## 2022-06-07 RX ADMIN — OXYCODONE HYDROCHLORIDE 5 MG: 5 TABLET ORAL at 18:31

## 2022-06-07 RX ADMIN — OXYCODONE HYDROCHLORIDE 5 MG: 5 TABLET ORAL at 10:00

## 2022-06-07 RX ADMIN — QUETIAPINE FUMARATE 50 MG: 25 TABLET ORAL at 21:20

## 2022-06-07 RX ADMIN — MORPHINE SULFATE 2 MG: 2 INJECTION, SOLUTION INTRAMUSCULAR; INTRAVENOUS at 07:10

## 2022-06-07 RX ADMIN — SIMETHICONE 80 MG: 80 TABLET, CHEWABLE ORAL at 17:25

## 2022-06-07 RX ADMIN — BUSPIRONE HYDROCHLORIDE 15 MG: 15 TABLET ORAL at 09:36

## 2022-06-07 RX ADMIN — FAMOTIDINE 20 MG: 20 TABLET ORAL at 17:25

## 2022-06-07 RX ADMIN — ATORVASTATIN CALCIUM 40 MG: 40 TABLET, FILM COATED ORAL at 21:01

## 2022-06-07 RX ADMIN — BUSPIRONE HYDROCHLORIDE 15 MG: 15 TABLET ORAL at 21:01

## 2022-06-07 RX ADMIN — SERTRALINE HYDROCHLORIDE 50 MG: 50 TABLET ORAL at 09:36

## 2022-06-07 RX ADMIN — FLUTICASONE PROPIONATE 2 SPRAY: 50 SPRAY, METERED NASAL at 09:49

## 2022-06-07 RX ADMIN — LACTULOSE 30 G: 20 SOLUTION ORAL at 17:25

## 2022-06-07 RX ADMIN — QUETIAPINE FUMARATE 50 MG: 25 TABLET ORAL at 13:36

## 2022-06-07 RX ADMIN — LACTULOSE 30 G: 20 SOLUTION ORAL at 12:27

## 2022-06-07 RX ADMIN — BUDESONIDE 0.5 MG: 0.5 SUSPENSION RESPIRATORY (INHALATION) at 08:11

## 2022-06-07 RX ADMIN — RIFAXIMIN 550 MG: 550 TABLET ORAL at 09:36

## 2022-06-07 RX ADMIN — FAMOTIDINE 20 MG: 20 TABLET ORAL at 07:10

## 2022-06-07 RX ADMIN — FUROSEMIDE 40 MG: 40 TABLET ORAL at 09:36

## 2022-06-07 RX ADMIN — Medication 10 ML: at 21:01

## 2022-06-07 RX ADMIN — Medication 100 MG: at 09:36

## 2022-06-07 RX ADMIN — RIFAXIMIN 550 MG: 550 TABLET ORAL at 21:01

## 2022-06-07 RX ADMIN — INSULIN DETEMIR 10 UNITS: 100 INJECTION, SOLUTION SUBCUTANEOUS at 09:52

## 2022-06-07 RX ADMIN — FUROSEMIDE 40 MG: 40 TABLET ORAL at 21:01

## 2022-06-07 NOTE — PROGRESS NOTES
" [facility]   Hospitalist Progress Note  Date: 2022  Patient Name: Nic Nicolas  : 1965  MRN: [id number]  Date of admission: 2022      Subjective   Subjective     Chief Complaint: Follow up for altered mental status    Interval Followup: No events overnight.  Patient sitting up in bedside chair upon entering room.  His speech remains pressured today.  He tells me that he has help at home and is wanting to be discharged, but upon review patient family is unable to care for him.  Patient does have flight of ideas this morning and he tells me that he has exotic cars at home that are \" still in the wrapper.\" He tells me his shoulder pain is well controlled. His blood pressure remained stable today.  He is saturating well on room air.  His heart rate remains controlled.  Discussed with palliative care and they will sign off as there are no current palliative care needs.    Review of Systems  All systems reviewed and are negative except as above in HPI.    Objective   Objective     Vitals:   Temp:  [97.7 °F (36.5 °C)-98.6 °F (37 °C)] 98.2 °F (36.8 °C)  Heart Rate:  [72-88] 85  Resp:  [20] 20  BP: (128-146)/(58-81) 146/67  Physical Exam   Constitutional: Alert, awake, sitting up in bedside chair  HEENT:  PERRLA, EOMI; No Scleral icterus  Neck:  Supple; No Mass, No Lymphadenopathy  Cardiovascular:  No Rubs, No Edema, No JVD   Respiratory: No respiratory distress, unlabored breathing, saturating well on room air  Abdomen:  Normal BS all 4 Quadrants; No Guarding, No Tenderness  Musculoskeletal:  Pulses Positive all 4 Ext; No Cyanosis, No Edema  Neurological: No facial asymmetry, answers questions, intermittently confused, has pressured speech, moves all extremities  Skin:  No Rash, No Cellulitis, No Erythema    Result Review    Result Review:  I have personally reviewed the results from the time of this admission to 2022 08:10 EDT and agree with these findings:  [x]  Laboratory  CBC    CBC 22 " 6/6/22 6/7/22   WBC 7.45 3.67 2.70 (A)   RBC 3.23 (A) 2.80 (A) 2.54 (A)   Hemoglobin 10.5 (A) 9.2 (A) 8.4 (A)   Hematocrit 30.1 (A) 26.5 (A) 24.5 (A)   MCV 93.2 94.6 96.5   MCH 32.5 32.9 33.1 (A)   MCHC 34.9 34.7 34.3   RDW 15.6 (A) 15.7 (A) 15.9 (A)   Platelets 103 (A) 63 (A) 49 (A)   (A) Abnormal value            BMP    BMP 6/4/22 6/6/22 6/7/22   BUN 26 (A) 19 16   Creatinine 0.89 0.78 0.59 (A)   Sodium 132 (A) 135 (A) 136   Potassium 4.5 4.3 3.9   Chloride 101 105 106   CO2 18.7 (A) 20.5 (A) 20.6 (A)   Calcium 9.1 8.9 8.8   (A) Abnormal value            [x]  Microbiology   []  Radiology  []  EKG/Telemetry   []  Cardiology/Vascular   []  Pathology  []  Old records  [x]  Other:     Intake/Output Summary (Last 24 hours) at 6/7/2022 0810  Last data filed at 6/6/2022 1830  Gross per 24 hour   Intake 1080 ml   Output --   Net 1080 ml         Assessment & Plan   Assessment / Plan     Assessment:  Hyperactive delirium  Minimally comminuted nondisplaced fracture of the greater tuberosity of the proximal humerus.  Minimally displaced fracture at the inferior glenoid.  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  Hepatic encephalopathy  ROMO cirrhosis  Pancytopenia likely related to cirrhosis  Insulin-dependent diabetes mellitus - blood sugars controlled  Obstructive sleep apnea not on home CPAP  History of traumatic brain injury  Mild hyperbilirubinemia  Major depression with suicidal ideation  Acute Respiratory Alkalosis , resolved  Thrombocytopenia   Dyslipidemia  Acute kidney injury secondary to prerenal etiology, resolved      Plan:   Patient overall remained stable without sitter, not requiring restraints  Continue with delirium precautions  Continue Seroquel, Haldol as needed for agitation  Continue with trazodone as needed at night for insomnia  Mentation improving, continue with lactulose and rifaximin  Psychiatry recommendations appreciated, continue BuSpar 15 mg twice daily along with Zoloft 50 mg daily  Continue  thiamine folic acid daily  Blood glucose stable, continue with Levemir along with correctional insulin  Hemoglobin stable, platelet count minimally decreased, continue to monitor, recheck CBC in a.m.  Patient continues to diurese well, renal function stable, continue with furosemide twice daily and spironolactone daily  Case management assistance appreciated, patient will guide placement as his family is unable to care for him, appreciate assistance with this patient  Continue to monitor for urinary retention, thus far no complaints  Labs reviewed, image reviewed, medications reviewed, vital signs reviewed  Further recommendations pending clinical course    Discussed with RN.    DVT prophylaxis:  Mechanical DVT prophylaxis orders are present.    CODE STATUS:   Code Status (Patient has no pulse and is not breathing): CPR (Attempt to Resuscitate)  Medical Interventions (Patient has pulse or is breathing): Full Support    Electronically signed by Denzel Bolanos DO, 06/07/22, 8:13 AM EDT.

## 2022-06-07 NOTE — NURSING NOTE
Patient is currently on 4nt. At this time, no further palliative care needs identified. Palliative care will sign off. Unit / to continue in discharge planning. Updated provider. If further palliative care needs are identified- please re-enter order for palliative care consult.    STONEY Finch, RN  Palliative Care

## 2022-06-07 NOTE — PLAN OF CARE
Nutrition Note    Patient followed by RD for LOS. Patient continues to have good intake, typically % of meals, tolerating therapeutic/modified texture diet well. Review of weight history shows weight stability for greater than 90 days, requested current weight from nursing. Pt remains confused requiring frequent redirection. BG is well managed typically under 150. No acute nutrition concerns or nutrition intervention at this time. RD will continue to follow and monitor per protocol.    BUBBA HYATT RD  09:07 EDT

## 2022-06-07 NOTE — TELEPHONE ENCOUNTER
LAST FILLED BY DANELLE [facility]ON[facility] INPATIENT PROVIDER  busPIRone (BUSPAR) tablet 15 mg (06/01/2022)    LAST FILLED BY DR AVERY AS FOLLOWS  busPIRone (BUSPAR) 7.5 MG tablet (05/02/2022)    PROVIDER PLEASE ADVISE

## 2022-06-07 NOTE — PLAN OF CARE
Goal Outcome Evaluation:  Plan of Care Reviewed With: patient        Progress: no change  Outcome Evaluation: pt aox3, can answer orientation questions appropriately, however seems disoriented to situation at times. pt has flight of ideas and becomes hyperfocused on certain things. pt very restless and seeks out staff frequently. pt has loose associations during conversations. prn oxycodone given for pain and seroquel given for restlessness/agitation per pt request. right shoulder sling in place but pt continues to use right arm frequently. pt stated he could live with his ex-mother in law, USMAN reyes called daughter dalila and she states that this is not an option. pt having multiple loose BMs throughout the day.

## 2022-06-08 LAB
ALBUMIN SERPL-MCNC: 3.5 G/DL (ref 3.5–5.2)
ALBUMIN/GLOB SERPL: 1 G/DL
ALP SERPL-CCNC: 127 U/L (ref 39–117)
ALT SERPL W P-5'-P-CCNC: 38 U/L (ref 1–41)
ANION GAP SERPL CALCULATED.3IONS-SCNC: 10.7 MMOL/L (ref 5–15)
AST SERPL-CCNC: 61 U/L (ref 1–40)
BASOPHILS # BLD AUTO: 0.03 10*3/MM3 (ref 0–0.2)
BASOPHILS NFR BLD AUTO: 0.6 % (ref 0–1.5)
BILIRUB SERPL-MCNC: 1.2 MG/DL (ref 0–1.2)
BUN SERPL-MCNC: 18 MG/DL (ref 6–20)
BUN/CREAT SERPL: 18.2 (ref 7–25)
CALCIUM SPEC-SCNC: 9.2 MG/DL (ref 8.6–10.5)
CHLORIDE SERPL-SCNC: 100 MMOL/L (ref 98–107)
CO2 SERPL-SCNC: 21.3 MMOL/L (ref 22–29)
CREAT SERPL-MCNC: 0.99 MG/DL (ref 0.76–1.27)
DEPRECATED RDW RBC AUTO: 54.8 FL (ref 37–54)
EGFRCR SERPLBLD CKD-EPI 2021: 89.4 ML/MIN/1.73
EOSINOPHIL # BLD AUTO: 0.12 10*3/MM3 (ref 0–0.4)
EOSINOPHIL NFR BLD AUTO: 2.3 % (ref 0.3–6.2)
ERYTHROCYTE [DISTWIDTH] IN BLOOD BY AUTOMATED COUNT: 15.9 % (ref 12.3–15.4)
GLOBULIN UR ELPH-MCNC: 3.6 GM/DL
GLUCOSE BLDC GLUCOMTR-MCNC: 131 MG/DL (ref 70–99)
GLUCOSE BLDC GLUCOMTR-MCNC: 197 MG/DL (ref 70–99)
GLUCOSE BLDC GLUCOMTR-MCNC: 219 MG/DL (ref 70–99)
GLUCOSE BLDC GLUCOMTR-MCNC: 282 MG/DL (ref 70–99)
GLUCOSE SERPL-MCNC: 172 MG/DL (ref 65–99)
HCT VFR BLD AUTO: 28.8 % (ref 37.5–51)
HGB BLD-MCNC: 9.8 G/DL (ref 13–17.7)
IMM GRANULOCYTES # BLD AUTO: 0.02 10*3/MM3 (ref 0–0.05)
IMM GRANULOCYTES NFR BLD AUTO: 0.4 % (ref 0–0.5)
LYMPHOCYTES # BLD AUTO: 0.72 10*3/MM3 (ref 0.7–3.1)
LYMPHOCYTES NFR BLD AUTO: 14 % (ref 19.6–45.3)
MAGNESIUM SERPL-MCNC: 1.4 MG/DL (ref 1.6–2.6)
MCH RBC QN AUTO: 32.7 PG (ref 26.6–33)
MCHC RBC AUTO-ENTMCNC: 34 G/DL (ref 31.5–35.7)
MCV RBC AUTO: 96 FL (ref 79–97)
MONOCYTES # BLD AUTO: 0.43 10*3/MM3 (ref 0.1–0.9)
MONOCYTES NFR BLD AUTO: 8.3 % (ref 5–12)
NEUTROPHILS NFR BLD AUTO: 3.83 10*3/MM3 (ref 1.7–7)
NEUTROPHILS NFR BLD AUTO: 74.4 % (ref 42.7–76)
NRBC BLD AUTO-RTO: 0 /100 WBC (ref 0–0.2)
PHOSPHATE SERPL-MCNC: 3.1 MG/DL (ref 2.5–4.5)
PLATELET # BLD AUTO: 71 10*3/MM3 (ref 140–450)
PMV BLD AUTO: 10.6 FL (ref 6–12)
POTASSIUM SERPL-SCNC: 4.2 MMOL/L (ref 3.5–5.2)
PROT SERPL-MCNC: 7.1 G/DL (ref 6–8.5)
RBC # BLD AUTO: 3 10*6/MM3 (ref 4.14–5.8)
SODIUM SERPL-SCNC: 132 MMOL/L (ref 136–145)
WBC NRBC COR # BLD: 5.15 10*3/MM3 (ref 3.4–10.8)

## 2022-06-08 PROCEDURE — 25010000002 MAGNESIUM SULFATE 2 GM/50ML SOLUTION: Performed by: STUDENT IN AN ORGANIZED HEALTH CARE EDUCATION/TRAINING PROGRAM

## 2022-06-08 PROCEDURE — 80053 COMPREHEN METABOLIC PANEL: CPT | Performed by: STUDENT IN AN ORGANIZED HEALTH CARE EDUCATION/TRAINING PROGRAM

## 2022-06-08 PROCEDURE — 83735 ASSAY OF MAGNESIUM: CPT | Performed by: STUDENT IN AN ORGANIZED HEALTH CARE EDUCATION/TRAINING PROGRAM

## 2022-06-08 PROCEDURE — 63710000001 INSULIN DETEMIR PER 5 UNITS: Performed by: FAMILY MEDICINE

## 2022-06-08 PROCEDURE — 85025 COMPLETE CBC W/AUTO DIFF WBC: CPT | Performed by: STUDENT IN AN ORGANIZED HEALTH CARE EDUCATION/TRAINING PROGRAM

## 2022-06-08 PROCEDURE — 94799 UNLISTED PULMONARY SVC/PX: CPT

## 2022-06-08 PROCEDURE — 82962 GLUCOSE BLOOD TEST: CPT

## 2022-06-08 PROCEDURE — 25010000002 MORPHINE PER 10 MG: Performed by: INTERNAL MEDICINE

## 2022-06-08 PROCEDURE — 84100 ASSAY OF PHOSPHORUS: CPT | Performed by: STUDENT IN AN ORGANIZED HEALTH CARE EDUCATION/TRAINING PROGRAM

## 2022-06-08 PROCEDURE — 25010000002 ONDANSETRON PER 1 MG: Performed by: HOSPITALIST

## 2022-06-08 PROCEDURE — 63710000001 INSULIN LISPRO (HUMAN) PER 5 UNITS: Performed by: FAMILY MEDICINE

## 2022-06-08 PROCEDURE — 99233 SBSQ HOSP IP/OBS HIGH 50: CPT | Performed by: STUDENT IN AN ORGANIZED HEALTH CARE EDUCATION/TRAINING PROGRAM

## 2022-06-08 RX ORDER — CETIRIZINE HYDROCHLORIDE 10 MG/1
10 TABLET ORAL DAILY
Status: DISCONTINUED | OUTPATIENT
Start: 2022-06-08 | End: 2022-06-17 | Stop reason: HOSPADM

## 2022-06-08 RX ORDER — OXYCODONE HYDROCHLORIDE 5 MG/1
10 TABLET ORAL EVERY 4 HOURS PRN
Status: DISCONTINUED | OUTPATIENT
Start: 2022-06-08 | End: 2022-06-16

## 2022-06-08 RX ORDER — BUSPIRONE HYDROCHLORIDE 7.5 MG/1
TABLET ORAL
Qty: 90 TABLET | Refills: 1 | OUTPATIENT
Start: 2022-06-08

## 2022-06-08 RX ORDER — LACTULOSE 10 G/15ML
20 SOLUTION ORAL 2 TIMES DAILY
Status: DISCONTINUED | OUTPATIENT
Start: 2022-06-09 | End: 2022-06-11

## 2022-06-08 RX ORDER — MAGNESIUM SULFATE HEPTAHYDRATE 40 MG/ML
2 INJECTION, SOLUTION INTRAVENOUS ONCE
Status: DISCONTINUED | OUTPATIENT
Start: 2022-06-08 | End: 2022-06-08

## 2022-06-08 RX ORDER — MAGNESIUM SULFATE HEPTAHYDRATE 40 MG/ML
2 INJECTION, SOLUTION INTRAVENOUS ONCE
Status: COMPLETED | OUTPATIENT
Start: 2022-06-08 | End: 2022-06-08

## 2022-06-08 RX ADMIN — BUDESONIDE 0.5 MG: 0.5 SUSPENSION RESPIRATORY (INHALATION) at 12:01

## 2022-06-08 RX ADMIN — BUSPIRONE HYDROCHLORIDE 15 MG: 15 TABLET ORAL at 09:11

## 2022-06-08 RX ADMIN — FAMOTIDINE 20 MG: 20 TABLET ORAL at 18:34

## 2022-06-08 RX ADMIN — FUROSEMIDE 40 MG: 40 TABLET ORAL at 08:17

## 2022-06-08 RX ADMIN — TRAZODONE HYDROCHLORIDE 50 MG: 50 TABLET ORAL at 20:59

## 2022-06-08 RX ADMIN — FOLIC ACID 1 MG: 1 TABLET ORAL at 08:17

## 2022-06-08 RX ADMIN — OXYCODONE HYDROCHLORIDE 10 MG: 5 TABLET ORAL at 11:41

## 2022-06-08 RX ADMIN — OXYCODONE HYDROCHLORIDE 10 MG: 5 TABLET ORAL at 16:08

## 2022-06-08 RX ADMIN — FAMOTIDINE 20 MG: 20 TABLET ORAL at 07:25

## 2022-06-08 RX ADMIN — QUETIAPINE FUMARATE 50 MG: 25 TABLET ORAL at 09:11

## 2022-06-08 RX ADMIN — MORPHINE SULFATE 2 MG: 2 INJECTION, SOLUTION INTRAMUSCULAR; INTRAVENOUS at 09:11

## 2022-06-08 RX ADMIN — MAGNESIUM SULFATE 2 G: 2 INJECTION INTRAVENOUS at 09:11

## 2022-06-08 RX ADMIN — Medication 10 ML: at 20:59

## 2022-06-08 RX ADMIN — MORPHINE SULFATE 2 MG: 2 INJECTION, SOLUTION INTRAMUSCULAR; INTRAVENOUS at 01:10

## 2022-06-08 RX ADMIN — OXYCODONE HYDROCHLORIDE 10 MG: 5 TABLET ORAL at 20:59

## 2022-06-08 RX ADMIN — BUDESONIDE 0.5 MG: 0.5 SUSPENSION RESPIRATORY (INHALATION) at 20:12

## 2022-06-08 RX ADMIN — ONDANSETRON 4 MG: 2 INJECTION INTRAMUSCULAR; INTRAVENOUS at 11:56

## 2022-06-08 RX ADMIN — INSULIN LISPRO 2 UNITS: 100 INJECTION, SOLUTION INTRAVENOUS; SUBCUTANEOUS at 08:18

## 2022-06-08 RX ADMIN — CETIRIZINE HYDROCHLORIDE 10 MG: 10 TABLET, FILM COATED ORAL at 11:41

## 2022-06-08 RX ADMIN — BUSPIRONE HYDROCHLORIDE 15 MG: 15 TABLET ORAL at 21:03

## 2022-06-08 RX ADMIN — ATORVASTATIN CALCIUM 40 MG: 40 TABLET, FILM COATED ORAL at 20:59

## 2022-06-08 RX ADMIN — INSULIN DETEMIR 10 UNITS: 100 INJECTION, SOLUTION SUBCUTANEOUS at 08:20

## 2022-06-08 RX ADMIN — Medication 10 ML: at 08:20

## 2022-06-08 RX ADMIN — Medication 100 MG: at 08:17

## 2022-06-08 RX ADMIN — MORPHINE SULFATE 2 MG: 2 INJECTION, SOLUTION INTRAMUSCULAR; INTRAVENOUS at 05:49

## 2022-06-08 RX ADMIN — INSULIN LISPRO 4 UNITS: 100 INJECTION, SOLUTION INTRAVENOUS; SUBCUTANEOUS at 13:01

## 2022-06-08 RX ADMIN — FLUTICASONE PROPIONATE 2 SPRAY: 50 SPRAY, METERED NASAL at 08:19

## 2022-06-08 RX ADMIN — SERTRALINE HYDROCHLORIDE 50 MG: 50 TABLET ORAL at 08:17

## 2022-06-08 RX ADMIN — RIFAXIMIN 550 MG: 550 TABLET ORAL at 20:59

## 2022-06-08 RX ADMIN — SPIRONOLACTONE 100 MG: 25 TABLET ORAL at 08:17

## 2022-06-08 RX ADMIN — RIFAXIMIN 550 MG: 550 TABLET ORAL at 08:17

## 2022-06-08 RX ADMIN — OXYCODONE HYDROCHLORIDE 5 MG: 5 TABLET ORAL at 04:44

## 2022-06-08 NOTE — PLAN OF CARE
Goal Outcome Evaluation:  Plan of Care Reviewed With: patient        Progress: no change  Outcome Evaluation: pt aox3-4, pt continues to have flight of ideas, very loose associations. frequently seeks out staff. pt gets very anxious and worked up easily. pt had shower and rested at times today. prn oxycodone given for pain and prn seroquel for agitation. pt does wear sling on right arm but still continues to use right arm, pt educated on resting shoulder/arm to allow for healing. pt frequently asks staff to call family members and other numbers.

## 2022-06-08 NOTE — PLAN OF CARE
Goal Outcome Evaluation:  Plan of Care Reviewed With: patient           Outcome Evaluation: Patient continues seeks out staff frequently for multiple requests, including pain meds, food and drinks. Patient gets anxious and tearful very easily and sometimes hard to redirect. PRN pain, agitation and sleep meds given per MAR per request. Patient has not slept all night. Says his pain is too bad to let him sleep. Says he had at least 15 diarrhea bowel movements since yesterday but staff has not witnessed

## 2022-06-08 NOTE — PROGRESS NOTES
" [facility]   Hospitalist Progress Note  Date: 2022  Patient Name: Nic Nicolas  : 1965  MRN: [id number]  Date of admission: 2022      Subjective   Subjective     Chief Complaint: Follow up for altered mental status    Interval Followup: No events overnight.  Per nursing station still has been hyperactive with pressured speech.  He is sitting up in bedside chair upon entering the room.  He did tell me when to get back into bed and I helped him.  I did encourage him not to use his right arm, which he has been using frequently.  Patient still having multiple bowel movements, will decrease frequency of lactulose.     Discussed with nursing and patient has been requesting frequent IV pain medication.  Discussed with patient he tells me that he is a chronic pain patient and was kicked out of pain management because his \" UDS came back for meth.\"  He asked me to increase his oxycodone and he wanted IV morphine on top of that.  I told him that I was discontinuing IV medications and I would increase his oxycodone for which he was agreeable.  He is currently on room air saturating well.  He has not had any fevers or chills.  He is tolerating oral intake without nausea or emesis.      Review of Systems  All systems reviewed and are negative except as above in HPI.    Objective   Objective     Vitals:   Temp:  [98.1 °F (36.7 °C)-98.6 °F (37 °C)] 98.1 °F (36.7 °C)  Heart Rate:  [] 96  Resp:  [18-20] 18  BP: (107-141)/(60-90) 135/63  Physical Exam   Constitutional: Alert, awake, sitting up in bedside chair  HEENT:  PERRLA, EOMI; No Scleral icterus  Neck:  Supple; No Mass, No Lymphadenopathy  Cardiovascular:  No Rubs, No Edema, No JVD   Respiratory: No respiratory distress, unlabored breathing, saturating well on room air  Abdomen:  Normal BS all 4 Quadrants; No Guarding, No Tenderness  Musculoskeletal:  Pulses Positive all 4 Ext; No Cyanosis, No Edema  Neurological: No facial asymmetry, answers " questions, intermittently confused, has pressured speech, moves all extremities  Skin:  No Rash, No Cellulitis, No Erythema    Result Review    Result Review:  I have personally reviewed the results from the time of this admission to 6/8/2022 07:48 EDT and agree with these findings:  [x]  Laboratory  CBC    CBC 6/6/22 6/7/22 6/8/22   WBC 3.67 2.70 (A) 5.15   RBC 2.80 (A) 2.54 (A) 3.00 (A)   Hemoglobin 9.2 (A) 8.4 (A) 9.8 (A)   Hematocrit 26.5 (A) 24.5 (A) 28.8 (A)   MCV 94.6 96.5 96.0   MCH 32.9 33.1 (A) 32.7   MCHC 34.7 34.3 34.0   RDW 15.7 (A) 15.9 (A) 15.9 (A)   Platelets 63 (A) 49 (A) 71 (A)   (A) Abnormal value            BMP    BMP 6/6/22 6/7/22 6/8/22   BUN 19 16 18   Creatinine 0.78 0.59 (A) 0.99   Sodium 135 (A) 136 132 (A)   Potassium 4.3 3.9 4.2   Chloride 105 106 100   CO2 20.5 (A) 20.6 (A) 21.3 (A)   Calcium 8.9 8.8 9.2   (A) Abnormal value            [x]  Microbiology   []  Radiology  []  EKG/Telemetry   []  Cardiology/Vascular   []  Pathology  []  Old records  [x]  Other:     Intake/Output Summary (Last 24 hours) at 6/8/2022 0748  Last data filed at 6/7/2022 1823  Gross per 24 hour   Intake 960 ml   Output --   Net 960 ml         Assessment & Plan   Assessment / Plan     Assessment:  Hyperactive delirium  Minimally comminuted nondisplaced fracture of the greater tuberosity of the proximal humerus.  Minimally displaced fracture at the inferior glenoid.  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  Hepatic encephalopathy  ROMO cirrhosis  Pancytopenia likely related to cirrhosis  Insulin-dependent diabetes mellitus - blood sugars controlled  Obstructive sleep apnea not on home CPAP  History of traumatic brain injury  Mild hyperbilirubinemia  Major depression with suicidal ideation  Acute Respiratory Alkalosis , resolved  Thrombocytopenia   Dyslipidemia  Acute kidney injury secondary to prerenal etiology, resolved      Plan:   Continue with delirium precautions, patient mentation overall unchanged, but  stable  Continue Seroquel, Haldol as needed for agitation  Continue with trazodone as needed at night for insomnia  Continue with lactulose and rifaximin, patient needs at least 2-3 bowel movements daily, can hold once he has 2 bowel movements  Psychiatry recommendations appreciated, continue BuSpar 15 mg twice daily along with Zoloft 50 mg daily  Continue thiamine folic acid daily  Continue with Levemir along with correctional insulin  Platelet count has trended up, continue to monitor, no evidence of any bleeding  Patient continues to diurese well, renal function stable, continue with furosemide twice daily and spironolactone daily  Case management assistance appreciated, patient will guide placement as his family is unable to care for him, appreciate assistance with this patient  Replenish magnesium, continue to monitor   Has trazodone as needed at night  Labs reviewed, image reviewed, medications reviewed, vital signs reviewed  Further recommendations pending clinical course    Discussed with RN.    DVT prophylaxis:  Mechanical DVT prophylaxis orders are present.    CODE STATUS:   Code Status (Patient has no pulse and is not breathing): CPR (Attempt to Resuscitate)  Medical Interventions (Patient has pulse or is breathing): Full Support    Electronically signed by Denzel Bolanos DO, 06/08/22, 7:48 AM EDT.

## 2022-06-08 NOTE — PAYOR COMM NOTE
"Parish Wright (57 y.o. Male)             Date of Birth   1965    Social Security Number       Address   [address] [place] [address]    Home Phone   [phone]    MRN   [id number]       Mormonism   Anglican    Marital Status                               Admission Date   5/24/22    Admission Type   Emergency    Admitting Provider   Denzel Bolanos DO    Attending Provider   Denzel Bolanos DO    Department, Room/Bed   [address], 4001/1       Discharge Date       Discharge Disposition       Discharge Destination                               Attending Provider: Denzel Bolanos DO    Allergies: No Known Allergies    Isolation: None   Infection: None   Code Status: CPR   Advance Care Planning Activity    Ht: 182.9 cm (72\")   Wt: 106 kg (233 lb 11 oz)    Admission Cmt: None   Principal Problem: None                Active Insurance as of 5/24/2022     Primary Coverage     Payor Plan Insurance Group Employer/Plan Group    [facility] [id number]     Payor Plan Address Payor Plan Phone Number Payor Plan Fax Number Effective Dates    [address]   5/1/2022 - None Entered    [place] [address]       Subscriber Name Subscriber Birth Date Member ID       PARISH WRIGHT 1965 [id number]                 Emergency Contacts      (Rel.) Home Phone Work Phone Mobile Phone    DG MURPHY (Sister) -- -- [phone]    Christo Soria (Friend) [phone] -- --    Nandini Wright (Daughter) -- -- [phone]      #[id number]   NO MD NOTE YET FOR TODAY      CONTACT   LONG S UTILIZATION REVIEW    [facility]  [address] [place] [address]  TAX ID [id number]  NPI  [id number]       [phone]   [phone]      Physician Progress Notes (last 24 hours)  Notes from 06/07/22 1552 through 06/08/22 1552   No notes of this type exist for this encounter.         Minnie Choi RN  "   Registered Nurse      Plan of Care       Signed   Date of Service:  22   Creation Time:  22              Signed              Show:Clear all  []Manual[x]Template[]Copied    Added by:  [x]Minnie Choi RN      []Hans for details    Goal Outcome Evaluation:  Plan of Care Reviewed With: patient  Progress: declining  Outcome Evaluation: Patient is alert and oriented x3 however is disoriented to situation. Patient has had flight of ideas, agitation, suspiciousness, restless, patient can be demanding at times when speaking to staff and frequently seeking out staff, patient has been making loose associations when speaking with RN. Patient has complained of pain throughout shift and has been medicated with PRN Morphine, and has had PRN seroquel for agitation x1. Patient shows no signs or symptoms of physical distress at this time and none are reported to RN, patient has call light within reach and is educated and aware of how to use.                    Ron Strong DO    Physician   Hospitalist   Progress Notes       Signed   Date of Service:  22   Creation Time:  22              Signed        Expand All Collapse All        Show:Clear all  [x]Manual[x]Template[x]Copied    Added by:  [x]Ron Strong DO      []Hans for details     [facility]   Hospitalist Progress Note  Date: 2022  Patient Name: Nic Nicolas  : 1965  MRN: [id number]  Date of admission: 2022           Subjective []Expand by Default     Subjective      Chief Complaint: Follow up for altered mental status     Interval Followup: Afebrile.  Blood pressure controlled.  Remains on room air.  Patient remains alert but having intermittent episodes of agitation and restlessness.  Has been refusing some of his oral medications including the pain meds and Seroquel.  He has been requesting IV morphine to treat shoulder pain.  3 bowel movements documented today.  Tolerating oral intake.  No  hypoglycemia.  No electrolyte abnormalities.      Review of Systems  Constitutional:  No Fever, No Chills  Respiratory:  No Cough, No Dyspnea  Gastrointestinal:  No Nausea, No Vomiting  Musculoskeletal: Right shoulder pain, dull, constant, worse with movement  10 point system reviewed and negative unless listed above           Objective      Objective      Vitals:   Temp:  [97.9 °F (36.6 °C)-98.6 °F (37 °C)] 98.2 °F (36.8 °C)  Heart Rate:  [72-91] 72  Resp:  [20] 20  BP: (103-137)/(52-81) 128/58  Physical Exam                         Constitutional:  male, conversant, up in chair, conversant, does not appear to be in pain              Eyes: Pupils equal and reactive, no conjunctival injection              HENT: NCAT, MMM, nares patent              Neck: Supple, trachea midline              Respiratory: Clear to auscultation bilaterally, nonlabored respirations               Cardiovascular: RRR, no murmurs, trace pedal edema              Gastrointestinal: Positive bowel sounds, soft, nontender              Musculoskeletal: No gross joint deformity. RUE in sling. No clubbing or cyanosis to extremities              Neurologic: A&Ox person/place/time, Cranial Nerves grossly intact, no focal deficit              Skin: Warm and dry, no rashes            Result Review       Result Review:  I have personally reviewed the results from the time of this admission to 6/6/2022 18:32 EDT and agree with these findings:  [x]?  Laboratory  CBC Results         CBC    CBC 6/3/22 6/4/22 6/6/22   WBC 7.37 7.45 3.67   RBC 3.22 (A) 3.23 (A) 2.80 (A)   Hemoglobin 10.5 (A) 10.5 (A) 9.2 (A)   Hematocrit 30.5 (A) 30.1 (A) 26.5 (A)   MCV 94.7 93.2 94.6   MCH 32.6 32.5 32.9   MCHC 34.4 34.9 34.7   RDW 15.9 (A) 15.6 (A) 15.7 (A)   Platelets 109 (A) 103 (A) 63 (A)   (A) Abnormal value                  Basic Metabolic Profile Reults:         BMP    BMP 6/3/22 6/4/22 6/6/22   BUN 26 (A) 26 (A) 19   Creatinine 0.80 0.89 0.78   Sodium 133  (A) 132 (A) 135 (A)   Potassium 4.9 4.5 4.3   Chloride 105 101 105   CO2 15.2 (A) 18.7 (A) 20.5 (A)   Calcium 8.8 9.1 8.9   (A) Abnormal value        Comments are available for some flowsheets but are not being displayed.                 [x]?  Microbiology   []?  Radiology  []?  EKG/Telemetry   []?  Cardiology/Vascular   []?  Pathology  []?  Old records  [x]?  Other:      Intake/Output Summary (Last 24 hours) at 6/6/2022 1832  Last data filed at 6/6/2022 1407      Gross per 24 hour   Intake 720 ml   Output --   Net 720 ml                  Assessment & Plan     Assessment / Plan      Assessment:  ?  Hyperactive delirium  Right shoulder pain  Minimally comminuted nondisplaced fracture of the greater tuberosity of the proximal humerus.  Minimally displaced fracture at the inferior glenoid.  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  Hepatic encephalopathy  ROMO cirrhosis  Pancytopenia likely related to cirrhosis  Insulin-dependent diabetes mellitus - blood sugars controlled  Obstructive sleep apnea not on home CPAP  History of traumatic brain injury  Mild hyperbilirubinemia  Major depression with suicidal ideation  Acute Respiratory Alkalosis , resolved  Thrombocytopenia   Dyslipidemia  Acute kidney injury secondary to prerenal etiology, resolved        Plan:   · Try to redirect as much as possible.  Avoid benzodiazepines. Avoid sitter and restraints unless patient were to become combative or aggressive to staff.  Delirium precautions ordered.  Continue p.o. Seroquel 50 mg q8 hours PRN agitation. Has IM Haldol 2 mg every 8 hours as needed Seroquel effective or refusing oral meds.  Continue IV morphine 2 mg every 3 hours as needed for severe pain is uncontrolled with oxycodone regimen.Add trazodone 50 mg as needed nightly if he is unable to sleep.  Do not let sleep during the day  · Continue BuSpar 15 mg twice daily and Zoloft 50 mg daily; per psychiatry  · Ammonia level 28.  Continue lactulose 30 g three times a day and  rifaximin 550 mg twice daily  · Continue daily folic acid and p.o thiamine replacement  · Creatinine stable.  Continue p.o. furosemide 40 mg twice daily and spironolactone 100 mg daily  · Blood sugars controlled -> continue Levemir 10 units daily with moderate dose SSI per protocol  · Continue atorvastatin 40 mg daily  · Cell counts stable/improved-> monitor intermittently  Working on discharge planning.  Unable to go back home as family is unable to provide support.  Had previously been discharged to long-term care facility however was discharged back home at that time due to insurance issues.  We are waiting to get clarification from LT facility.  Pearls to other facilities have been sent.     Discussed with RNRANDA      DVT prophylaxis:  Mechanical DVT prophylaxis orders are present.     CODE STATUS:   Code Status (Patient has no pulse and is not breathing): CPR (Attempt to Resuscitate)  Medical Interventions (Patient has pulse or is breathing): Full Support     Electronically signed by Ron Strong DO, 22, 6:32 PM EDT.                                                Denzel Bolanos DO    Physician   Hospitalist   Progress Notes       Signed   Date of Service:  22   Creation Time:  22              Signed        Expand All Collapse All        Show:Clear all  [x]Manual[x]Template[x]Copied    Added by:  [x]Denzel Bolanos DO      []Hans for details     [facility]   Hospitalist Progress Note  Date: 2022  Patient Name: Nic Nicolas  : 1965  MRN: [id number]  Date of admission: 2022           Subjective []Expand by Default     Subjective      Chief Complaint: Follow up for altered mental status     Interval Followup: No events overnight.  Patient sitting up in bedside chair upon entering room.  His speech remains pressured today.  He tells me that he has help at home and is wanting to be discharged, but upon review patient family is unable to care for him.  Patient  "does have flight of ideas this morning and he tells me that he has exotic cars at home that are \" still in the wrapper.\" He tells me his shoulder pain is well controlled. His blood pressure remained stable today.  He is saturating well on room air.  His heart rate remains controlled.  Discussed with palliative care and they will sign off as there are no current palliative care needs.     Review of Systems  All systems reviewed and are negative except as above in HPI.           Objective      Objective      Vitals:   Temp:  [97.7 °F (36.5 °C)-98.6 °F (37 °C)] 98.2 °F (36.8 °C)  Heart Rate:  [72-88] 85  Resp:  [20] 20  BP: (128-146)/(58-81) 146/67  Physical Exam             Constitutional: Alert, awake, sitting up in bedside chair  HEENT:  PERRLA, EOMI; No Scleral icterus  Neck:  Supple; No Mass, No Lymphadenopathy  Cardiovascular:  No Rubs, No Edema, No JVD   Respiratory: No respiratory distress, unlabored breathing, saturating well on room air  Abdomen:  Normal BS all 4 Quadrants; No Guarding, No Tenderness  Musculoskeletal:  Pulses Positive all 4 Ext; No Cyanosis, No Edema  Neurological: No facial asymmetry, answers questions, intermittently confused, has pressured speech, moves all extremities  Skin:  No Rash, No Cellulitis, No Erythema           Result Review       Result Review:  I have personally reviewed the results from the time of this admission to 6/7/2022 08:10 EDT and agree with these findings:  [x]?  Laboratory  CBC Results         CBC    CBC 6/4/22 6/6/22 6/7/22   WBC 7.45 3.67 2.70 (A)   RBC 3.23 (A) 2.80 (A) 2.54 (A)   Hemoglobin 10.5 (A) 9.2 (A) 8.4 (A)   Hematocrit 30.1 (A) 26.5 (A) 24.5 (A)   MCV 93.2 94.6 96.5   MCH 32.5 32.9 33.1 (A)   MCHC 34.9 34.7 34.3   RDW 15.6 (A) 15.7 (A) 15.9 (A)   Platelets 103 (A) 63 (A) 49 (A)   (A) Abnormal value                  Basic Metabolic Profile Reults:         BMP    BMP 6/4/22 6/6/22 6/7/22   BUN 26 (A) 19 16   Creatinine 0.89 0.78 0.59 (A)   Sodium 132 (A) " 135 (A) 136   Potassium 4.5 4.3 3.9   Chloride 101 105 106   CO2 18.7 (A) 20.5 (A) 20.6 (A)   Calcium 9.1 8.9 8.8   (A) Abnormal value                  [x]?  Microbiology   []?  Radiology  []?  EKG/Telemetry   []?  Cardiology/Vascular   []?  Pathology  []?  Old records  [x]?  Other:      Intake/Output Summary (Last 24 hours) at 6/7/2022 0810  Last data filed at 6/6/2022 1830      Gross per 24 hour   Intake 1080 ml   Output --   Net 1080 ml                  Assessment & Plan     Assessment / Plan      Assessment:  Hyperactive delirium  Minimally comminuted nondisplaced fracture of the greater tuberosity of the proximal humerus.  Minimally displaced fracture at the inferior glenoid.  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  Hepatic encephalopathy  ROMO cirrhosis  Pancytopenia likely related to cirrhosis  Insulin-dependent diabetes mellitus - blood sugars controlled  Obstructive sleep apnea not on home CPAP  History of traumatic brain injury  Mild hyperbilirubinemia  Major depression with suicidal ideation  Acute Respiratory Alkalosis , resolved  Thrombocytopenia   Dyslipidemia  Acute kidney injury secondary to prerenal etiology, resolved        Plan:   · Patient overall remained stable without sitter, not requiring restraints  · Continue with delirium precautions  · Continue Seroquel, Haldol as needed for agitation  · Continue with trazodone as needed at night for insomnia  · Mentation improving, continue with lactulose and rifaximin  · Psychiatry recommendations appreciated, continue BuSpar 15 mg twice daily along with Zoloft 50 mg daily  · Continue thiamine folic acid daily  · Blood glucose stable, continue with Levemir along with correctional insulin  · Hemoglobin stable, platelet count minimally decreased, continue to monitor, recheck CBC in a.m.  · Patient continues to diurese well, renal function stable, continue with furosemide twice daily and spironolactone daily  · Case management assistance appreciated,  patient will guide placement as his family is unable to care for him, appreciate assistance with this patient  · Continue to monitor for urinary retention, thus far no complaints  · Labs reviewed, image reviewed, medications reviewed, vital signs reviewed  · Further recommendations pending clinical course     Discussed with RN.     DVT prophylaxis:  Mechanical DVT prophylaxis orders are present.     CODE STATUS:   Code Status (Patient has no pulse and is not breathing): CPR (Attempt to Resuscitate)  Medical Interventions (Patient has pulse or is breathing): Full Support     Electronically signed by Denzel Bolanos DO, 06/07/22, 8:13 AM EDT.                                                           Sparkle Weston, RN    Registered Nurse      Plan of Care       Signed   Date of Service:  06/07/22 1854   Creation Time:  06/07/22 1854              Signed              Show:Clear all  []Manual[x]Template[]Copied    Added by:  [x]Sparkle Weston RN      []Hover for details    Goal Outcome Evaluation:  Plan of Care Reviewed With: patient  Progress: no change  Outcome Evaluation: pt aox3, can answer orientation questions appropriately, however seems disoriented to situation at times. pt has flight of ideas and becomes hyperfocused on certain things. pt very restless and seeks out staff frequently. pt has loose associations during conversations. prn oxycodone given for pain and seroquel given for restlessness/agitation per pt request. right shoulder sling in place but pt continues to use right arm frequently. pt stated he could live with his ex-mother in law, USMAN reyes called daughter dalila and she states that this is not an option. pt having multiple loose BMs throughout the day.                    Maryana Carcamo RN    Registered Nurse   Nursing   Plan of Care       Signed   Date of Service:  06/08/22 0639   Creation Time:  06/08/22 0639              Signed              Show:Clear all  []Manual[x]Template[]Copied    Added  by:  [x]Maryana Carcamo RN      []Hans for details    Goal Outcome Evaluation:  Plan of Care Reviewed With: patient  Outcome Evaluation: Patient continues seeks out staff frequently for multiple requests, including pain meds, food and drinks. Patient gets anxious and tearful very easily and sometimes hard to redirect. PRN pain, agitation and sleep meds given per MAR per request. Patient has not slept all night. Says his pain is too bad to let him sleep. Says he had at least 15 diarrhea bowel movements since yesterday but staff has not witnessed

## 2022-06-09 LAB
ALBUMIN SERPL-MCNC: 2.8 G/DL (ref 3.5–5.2)
ALBUMIN/GLOB SERPL: 1 G/DL
ALP SERPL-CCNC: 119 U/L (ref 39–117)
ALT SERPL W P-5'-P-CCNC: 28 U/L (ref 1–41)
ANION GAP SERPL CALCULATED.3IONS-SCNC: 8.4 MMOL/L (ref 5–15)
AST SERPL-CCNC: 41 U/L (ref 1–40)
BASOPHILS # BLD AUTO: 0.01 10*3/MM3 (ref 0–0.2)
BASOPHILS NFR BLD AUTO: 0.3 % (ref 0–1.5)
BILIRUB SERPL-MCNC: 0.7 MG/DL (ref 0–1.2)
BUN SERPL-MCNC: 17 MG/DL (ref 6–20)
BUN/CREAT SERPL: 19.5 (ref 7–25)
CALCIUM SPEC-SCNC: 8.5 MG/DL (ref 8.6–10.5)
CHLORIDE SERPL-SCNC: 103 MMOL/L (ref 98–107)
CO2 SERPL-SCNC: 22.6 MMOL/L (ref 22–29)
CREAT SERPL-MCNC: 0.87 MG/DL (ref 0.76–1.27)
DEPRECATED RDW RBC AUTO: 56 FL (ref 37–54)
EGFRCR SERPLBLD CKD-EPI 2021: 101.3 ML/MIN/1.73
EOSINOPHIL # BLD AUTO: 0.09 10*3/MM3 (ref 0–0.4)
EOSINOPHIL NFR BLD AUTO: 2.6 % (ref 0.3–6.2)
ERYTHROCYTE [DISTWIDTH] IN BLOOD BY AUTOMATED COUNT: 16 % (ref 12.3–15.4)
GLOBULIN UR ELPH-MCNC: 2.7 GM/DL
GLUCOSE BLDC GLUCOMTR-MCNC: 128 MG/DL (ref 70–99)
GLUCOSE BLDC GLUCOMTR-MCNC: 174 MG/DL (ref 70–99)
GLUCOSE BLDC GLUCOMTR-MCNC: 182 MG/DL (ref 70–99)
GLUCOSE SERPL-MCNC: 167 MG/DL (ref 65–99)
HCT VFR BLD AUTO: 25.8 % (ref 37.5–51)
HGB BLD-MCNC: 8.8 G/DL (ref 13–17.7)
IMM GRANULOCYTES # BLD AUTO: 0.01 10*3/MM3 (ref 0–0.05)
IMM GRANULOCYTES NFR BLD AUTO: 0.3 % (ref 0–0.5)
LYMPHOCYTES # BLD AUTO: 0.49 10*3/MM3 (ref 0.7–3.1)
LYMPHOCYTES NFR BLD AUTO: 14.4 % (ref 19.6–45.3)
MAGNESIUM SERPL-MCNC: 1.6 MG/DL (ref 1.6–2.6)
MCH RBC QN AUTO: 33 PG (ref 26.6–33)
MCHC RBC AUTO-ENTMCNC: 34.1 G/DL (ref 31.5–35.7)
MCV RBC AUTO: 96.6 FL (ref 79–97)
MONOCYTES # BLD AUTO: 0.3 10*3/MM3 (ref 0.1–0.9)
MONOCYTES NFR BLD AUTO: 8.8 % (ref 5–12)
NEUTROPHILS NFR BLD AUTO: 2.51 10*3/MM3 (ref 1.7–7)
NEUTROPHILS NFR BLD AUTO: 73.6 % (ref 42.7–76)
NRBC BLD AUTO-RTO: 0 /100 WBC (ref 0–0.2)
PHOSPHATE SERPL-MCNC: 3.3 MG/DL (ref 2.5–4.5)
PLATELET # BLD AUTO: 54 10*3/MM3 (ref 140–450)
PMV BLD AUTO: 11.1 FL (ref 6–12)
POTASSIUM SERPL-SCNC: 4.2 MMOL/L (ref 3.5–5.2)
PROT SERPL-MCNC: 5.5 G/DL (ref 6–8.5)
RBC # BLD AUTO: 2.67 10*6/MM3 (ref 4.14–5.8)
SODIUM SERPL-SCNC: 134 MMOL/L (ref 136–145)
WBC NRBC COR # BLD: 3.41 10*3/MM3 (ref 3.4–10.8)

## 2022-06-09 PROCEDURE — 83735 ASSAY OF MAGNESIUM: CPT | Performed by: STUDENT IN AN ORGANIZED HEALTH CARE EDUCATION/TRAINING PROGRAM

## 2022-06-09 PROCEDURE — 85025 COMPLETE CBC W/AUTO DIFF WBC: CPT | Performed by: STUDENT IN AN ORGANIZED HEALTH CARE EDUCATION/TRAINING PROGRAM

## 2022-06-09 PROCEDURE — 82962 GLUCOSE BLOOD TEST: CPT

## 2022-06-09 PROCEDURE — 84100 ASSAY OF PHOSPHORUS: CPT | Performed by: STUDENT IN AN ORGANIZED HEALTH CARE EDUCATION/TRAINING PROGRAM

## 2022-06-09 PROCEDURE — 25010000002 ONDANSETRON PER 1 MG: Performed by: HOSPITALIST

## 2022-06-09 PROCEDURE — 63710000001 INSULIN DETEMIR PER 5 UNITS: Performed by: FAMILY MEDICINE

## 2022-06-09 PROCEDURE — 63710000001 INSULIN LISPRO (HUMAN) PER 5 UNITS: Performed by: FAMILY MEDICINE

## 2022-06-09 PROCEDURE — 99233 SBSQ HOSP IP/OBS HIGH 50: CPT | Performed by: STUDENT IN AN ORGANIZED HEALTH CARE EDUCATION/TRAINING PROGRAM

## 2022-06-09 PROCEDURE — 94799 UNLISTED PULMONARY SVC/PX: CPT

## 2022-06-09 PROCEDURE — 80053 COMPREHEN METABOLIC PANEL: CPT | Performed by: STUDENT IN AN ORGANIZED HEALTH CARE EDUCATION/TRAINING PROGRAM

## 2022-06-09 RX ORDER — HYDROCORTISONE 25 MG/G
CREAM TOPICAL 2 TIMES DAILY
Status: DISCONTINUED | OUTPATIENT
Start: 2022-06-09 | End: 2022-06-17 | Stop reason: HOSPADM

## 2022-06-09 RX ADMIN — FUROSEMIDE 40 MG: 40 TABLET ORAL at 08:19

## 2022-06-09 RX ADMIN — LACTULOSE 20 G: 20 SOLUTION ORAL at 08:18

## 2022-06-09 RX ADMIN — BUSPIRONE HYDROCHLORIDE 15 MG: 15 TABLET ORAL at 08:23

## 2022-06-09 RX ADMIN — OXYCODONE HYDROCHLORIDE 10 MG: 5 TABLET ORAL at 12:19

## 2022-06-09 RX ADMIN — FAMOTIDINE 20 MG: 20 TABLET ORAL at 16:15

## 2022-06-09 RX ADMIN — INSULIN LISPRO 2 UNITS: 100 INJECTION, SOLUTION INTRAVENOUS; SUBCUTANEOUS at 08:20

## 2022-06-09 RX ADMIN — OXYCODONE HYDROCHLORIDE 10 MG: 5 TABLET ORAL at 04:21

## 2022-06-09 RX ADMIN — BUDESONIDE 0.5 MG: 0.5 SUSPENSION RESPIRATORY (INHALATION) at 10:00

## 2022-06-09 RX ADMIN — POLYETHYLENE GLYCOL 400 AND PROPYLENE GLYCOL 2 DROP: 4; 3 SOLUTION/ DROPS OPHTHALMIC at 20:26

## 2022-06-09 RX ADMIN — FOLIC ACID 1 MG: 1 TABLET ORAL at 08:20

## 2022-06-09 RX ADMIN — LACTULOSE 20 G: 20 SOLUTION ORAL at 20:22

## 2022-06-09 RX ADMIN — Medication 10 ML: at 20:23

## 2022-06-09 RX ADMIN — FAMOTIDINE 20 MG: 20 TABLET ORAL at 06:42

## 2022-06-09 RX ADMIN — OXYCODONE HYDROCHLORIDE 10 MG: 5 TABLET ORAL at 20:22

## 2022-06-09 RX ADMIN — ATORVASTATIN CALCIUM 40 MG: 40 TABLET, FILM COATED ORAL at 20:22

## 2022-06-09 RX ADMIN — FUROSEMIDE 40 MG: 40 TABLET ORAL at 20:22

## 2022-06-09 RX ADMIN — BUDESONIDE 0.5 MG: 0.5 SUSPENSION RESPIRATORY (INHALATION) at 22:44

## 2022-06-09 RX ADMIN — HYDROCORTISONE 2.5%: 25 CREAM TOPICAL at 20:25

## 2022-06-09 RX ADMIN — Medication 10 ML: at 08:20

## 2022-06-09 RX ADMIN — TRAZODONE HYDROCHLORIDE 50 MG: 50 TABLET ORAL at 20:22

## 2022-06-09 RX ADMIN — INSULIN LISPRO 2 UNITS: 100 INJECTION, SOLUTION INTRAVENOUS; SUBCUTANEOUS at 17:00

## 2022-06-09 RX ADMIN — FAMOTIDINE 20 MG: 20 TABLET ORAL at 16:57

## 2022-06-09 RX ADMIN — ONDANSETRON 4 MG: 2 INJECTION INTRAMUSCULAR; INTRAVENOUS at 20:49

## 2022-06-09 RX ADMIN — FLUTICASONE PROPIONATE 2 SPRAY: 50 SPRAY, METERED NASAL at 08:21

## 2022-06-09 RX ADMIN — SIMETHICONE 80 MG: 80 TABLET, CHEWABLE ORAL at 16:57

## 2022-06-09 RX ADMIN — BUSPIRONE HYDROCHLORIDE 15 MG: 15 TABLET ORAL at 20:22

## 2022-06-09 RX ADMIN — SPIRONOLACTONE 100 MG: 25 TABLET ORAL at 08:18

## 2022-06-09 RX ADMIN — CETIRIZINE HYDROCHLORIDE 10 MG: 10 TABLET, FILM COATED ORAL at 08:20

## 2022-06-09 RX ADMIN — SERTRALINE HYDROCHLORIDE 50 MG: 50 TABLET ORAL at 08:20

## 2022-06-09 RX ADMIN — HYDROCORTISONE 2.5%: 25 CREAM TOPICAL at 12:20

## 2022-06-09 RX ADMIN — INSULIN DETEMIR 10 UNITS: 100 INJECTION, SOLUTION SUBCUTANEOUS at 08:24

## 2022-06-09 RX ADMIN — SIMETHICONE 80 MG: 80 TABLET, CHEWABLE ORAL at 06:42

## 2022-06-09 RX ADMIN — OXYCODONE HYDROCHLORIDE 10 MG: 5 TABLET ORAL at 08:19

## 2022-06-09 RX ADMIN — POLYETHYLENE GLYCOL 400 AND PROPYLENE GLYCOL 2 DROP: 4; 3 SOLUTION/ DROPS OPHTHALMIC at 12:19

## 2022-06-09 RX ADMIN — SIMETHICONE 80 MG: 80 TABLET, CHEWABLE ORAL at 20:22

## 2022-06-09 RX ADMIN — OXYCODONE HYDROCHLORIDE 10 MG: 5 TABLET ORAL at 16:15

## 2022-06-09 RX ADMIN — Medication 100 MG: at 08:19

## 2022-06-09 NOTE — PLAN OF CARE
"Goal Outcome Evaluation:              Outcome Evaluation: Pt has stated that he has had blood come out of every \"hole\" in his body today. Pt did have a small amount of blood this morning when wiping. Pt also stated that he had vomited twice, no vomiting was seen by staff. Pt does have flight of ideas and rambles. Vitals have been stable. Pt states that he would like to go to [facility] nursing and rehab in [facility] when discharged. Will continue to monitor.  "

## 2022-06-09 NOTE — PLAN OF CARE
Goal Outcome Evaluation:  Plan of Care Reviewed With: patient        Progress: improving  Outcome Evaluation: Patient mood seems to be more stable overnight. Was able to sleep some once in bed. Patient less anxious and tearful than previous shift. PRN pain meds given for shoulder and arm pain

## 2022-06-10 LAB
ALBUMIN SERPL-MCNC: 2.9 G/DL (ref 3.5–5.2)
ALBUMIN/GLOB SERPL: 0.9 G/DL
ALP SERPL-CCNC: 131 U/L (ref 39–117)
ALT SERPL W P-5'-P-CCNC: 30 U/L (ref 1–41)
AMMONIA BLD-SCNC: 54 UMOL/L (ref 16–60)
ANION GAP SERPL CALCULATED.3IONS-SCNC: 11.4 MMOL/L (ref 5–15)
ANION GAP SERPL CALCULATED.3IONS-SCNC: 9.6 MMOL/L (ref 5–15)
ANISOCYTOSIS BLD QL: NORMAL
AST SERPL-CCNC: 53 U/L (ref 1–40)
BASOPHILS # BLD AUTO: 0.03 10*3/MM3 (ref 0–0.2)
BASOPHILS NFR BLD AUTO: 0.7 % (ref 0–1.5)
BILIRUB SERPL-MCNC: 0.9 MG/DL (ref 0–1.2)
BUN SERPL-MCNC: 16 MG/DL (ref 6–20)
BUN SERPL-MCNC: 17 MG/DL (ref 6–20)
BUN/CREAT SERPL: 15.1 (ref 7–25)
BUN/CREAT SERPL: 17.3 (ref 7–25)
CALCIUM SPEC-SCNC: 8.9 MG/DL (ref 8.6–10.5)
CALCIUM SPEC-SCNC: 8.9 MG/DL (ref 8.6–10.5)
CHLORIDE SERPL-SCNC: 103 MMOL/L (ref 98–107)
CHLORIDE SERPL-SCNC: 99 MMOL/L (ref 98–107)
CO2 SERPL-SCNC: 17.6 MMOL/L (ref 22–29)
CO2 SERPL-SCNC: 25.4 MMOL/L (ref 22–29)
CREAT SERPL-MCNC: 0.98 MG/DL (ref 0.76–1.27)
CREAT SERPL-MCNC: 1.06 MG/DL (ref 0.76–1.27)
DEPRECATED RDW RBC AUTO: 61.7 FL (ref 37–54)
EGFRCR SERPLBLD CKD-EPI 2021: 82.4 ML/MIN/1.73
EGFRCR SERPLBLD CKD-EPI 2021: 90.5 ML/MIN/1.73
EOSINOPHIL # BLD AUTO: 0.13 10*3/MM3 (ref 0–0.4)
EOSINOPHIL NFR BLD AUTO: 3.1 % (ref 0.3–6.2)
ERYTHROCYTE [DISTWIDTH] IN BLOOD BY AUTOMATED COUNT: 16 % (ref 12.3–15.4)
GLOBULIN UR ELPH-MCNC: 3.4 GM/DL
GLUCOSE BLDC GLUCOMTR-MCNC: 142 MG/DL (ref 70–99)
GLUCOSE BLDC GLUCOMTR-MCNC: 170 MG/DL (ref 70–99)
GLUCOSE BLDC GLUCOMTR-MCNC: 180 MG/DL (ref 70–99)
GLUCOSE SERPL-MCNC: 210 MG/DL (ref 65–99)
GLUCOSE SERPL-MCNC: 211 MG/DL (ref 65–99)
HCT VFR BLD AUTO: 31.2 % (ref 37.5–51)
HGB BLD-MCNC: 9.7 G/DL (ref 13–17.7)
IMM GRANULOCYTES # BLD AUTO: 0.02 10*3/MM3 (ref 0–0.05)
IMM GRANULOCYTES NFR BLD AUTO: 0.5 % (ref 0–0.5)
LYMPHOCYTES # BLD AUTO: 0.64 10*3/MM3 (ref 0.7–3.1)
LYMPHOCYTES NFR BLD AUTO: 15.1 % (ref 19.6–45.3)
MACROCYTES BLD QL SMEAR: NORMAL
MAGNESIUM SERPL-MCNC: 1.6 MG/DL (ref 1.6–2.6)
MCH RBC QN AUTO: 32.7 PG (ref 26.6–33)
MCHC RBC AUTO-ENTMCNC: 31.1 G/DL (ref 31.5–35.7)
MCV RBC AUTO: 105.1 FL (ref 79–97)
MONOCYTES # BLD AUTO: 0.34 10*3/MM3 (ref 0.1–0.9)
MONOCYTES NFR BLD AUTO: 8 % (ref 5–12)
NEUTROPHILS NFR BLD AUTO: 3.09 10*3/MM3 (ref 1.7–7)
NEUTROPHILS NFR BLD AUTO: 72.6 % (ref 42.7–76)
NRBC BLD AUTO-RTO: 0 /100 WBC (ref 0–0.2)
PHOSPHATE SERPL-MCNC: 3.6 MG/DL (ref 2.5–4.5)
PLATELET # BLD AUTO: 50 10*3/MM3 (ref 140–450)
PMV BLD AUTO: 10.7 FL (ref 6–12)
POTASSIUM SERPL-SCNC: 4.5 MMOL/L (ref 3.5–5.2)
POTASSIUM SERPL-SCNC: 5.4 MMOL/L (ref 3.5–5.2)
PROT SERPL-MCNC: 6.3 G/DL (ref 6–8.5)
RBC # BLD AUTO: 2.97 10*6/MM3 (ref 4.14–5.8)
SMALL PLATELETS BLD QL SMEAR: NORMAL
SODIUM SERPL-SCNC: 132 MMOL/L (ref 136–145)
SODIUM SERPL-SCNC: 134 MMOL/L (ref 136–145)
WBC MORPH BLD: NORMAL
WBC NRBC COR # BLD: 4.25 10*3/MM3 (ref 3.4–10.8)

## 2022-06-10 PROCEDURE — 63710000001 INSULIN DETEMIR PER 5 UNITS: Performed by: FAMILY MEDICINE

## 2022-06-10 PROCEDURE — 80053 COMPREHEN METABOLIC PANEL: CPT | Performed by: STUDENT IN AN ORGANIZED HEALTH CARE EDUCATION/TRAINING PROGRAM

## 2022-06-10 PROCEDURE — 99233 SBSQ HOSP IP/OBS HIGH 50: CPT | Performed by: STUDENT IN AN ORGANIZED HEALTH CARE EDUCATION/TRAINING PROGRAM

## 2022-06-10 PROCEDURE — 85007 BL SMEAR W/DIFF WBC COUNT: CPT | Performed by: STUDENT IN AN ORGANIZED HEALTH CARE EDUCATION/TRAINING PROGRAM

## 2022-06-10 PROCEDURE — 25010000002 ONDANSETRON PER 1 MG: Performed by: HOSPITALIST

## 2022-06-10 PROCEDURE — 82140 ASSAY OF AMMONIA: CPT | Performed by: STUDENT IN AN ORGANIZED HEALTH CARE EDUCATION/TRAINING PROGRAM

## 2022-06-10 PROCEDURE — 25010000002 CALCIUM GLUCONATE-NACL 1-0.675 GM/50ML-% SOLUTION: Performed by: STUDENT IN AN ORGANIZED HEALTH CARE EDUCATION/TRAINING PROGRAM

## 2022-06-10 PROCEDURE — 63710000001 INSULIN REGULAR HUMAN PER 5 UNITS: Performed by: STUDENT IN AN ORGANIZED HEALTH CARE EDUCATION/TRAINING PROGRAM

## 2022-06-10 PROCEDURE — 94760 N-INVAS EAR/PLS OXIMETRY 1: CPT

## 2022-06-10 PROCEDURE — 63710000001 INSULIN LISPRO (HUMAN) PER 5 UNITS: Performed by: FAMILY MEDICINE

## 2022-06-10 PROCEDURE — 84100 ASSAY OF PHOSPHORUS: CPT | Performed by: STUDENT IN AN ORGANIZED HEALTH CARE EDUCATION/TRAINING PROGRAM

## 2022-06-10 PROCEDURE — 83735 ASSAY OF MAGNESIUM: CPT | Performed by: STUDENT IN AN ORGANIZED HEALTH CARE EDUCATION/TRAINING PROGRAM

## 2022-06-10 PROCEDURE — 94799 UNLISTED PULMONARY SVC/PX: CPT

## 2022-06-10 PROCEDURE — 82962 GLUCOSE BLOOD TEST: CPT

## 2022-06-10 PROCEDURE — 85025 COMPLETE CBC W/AUTO DIFF WBC: CPT | Performed by: STUDENT IN AN ORGANIZED HEALTH CARE EDUCATION/TRAINING PROGRAM

## 2022-06-10 RX ORDER — DEXTROSE MONOHYDRATE 25 G/50ML
50 INJECTION, SOLUTION INTRAVENOUS ONCE
Status: COMPLETED | OUTPATIENT
Start: 2022-06-10 | End: 2022-06-10

## 2022-06-10 RX ORDER — CALCIUM GLUCONATE 20 MG/ML
1 INJECTION, SOLUTION INTRAVENOUS ONCE
Status: COMPLETED | OUTPATIENT
Start: 2022-06-10 | End: 2022-06-10

## 2022-06-10 RX ADMIN — OXYCODONE HYDROCHLORIDE 10 MG: 5 TABLET ORAL at 05:25

## 2022-06-10 RX ADMIN — ONDANSETRON 4 MG: 2 INJECTION INTRAMUSCULAR; INTRAVENOUS at 16:39

## 2022-06-10 RX ADMIN — SODIUM BICARBONATE 100 ML/HR: 84 INJECTION, SOLUTION INTRAVENOUS at 14:57

## 2022-06-10 RX ADMIN — HYDROCORTISONE 2.5%: 25 CREAM TOPICAL at 09:29

## 2022-06-10 RX ADMIN — FAMOTIDINE 20 MG: 20 TABLET ORAL at 16:39

## 2022-06-10 RX ADMIN — ONDANSETRON 4 MG: 2 INJECTION INTRAMUSCULAR; INTRAVENOUS at 09:26

## 2022-06-10 RX ADMIN — INSULIN LISPRO 2 UNITS: 100 INJECTION, SOLUTION INTRAVENOUS; SUBCUTANEOUS at 09:07

## 2022-06-10 RX ADMIN — DEXTROSE MONOHYDRATE 50 ML: 25 INJECTION, SOLUTION INTRAVENOUS at 13:31

## 2022-06-10 RX ADMIN — Medication 10 ML: at 09:08

## 2022-06-10 RX ADMIN — Medication 10 ML: at 21:54

## 2022-06-10 RX ADMIN — FUROSEMIDE 40 MG: 40 TABLET ORAL at 09:26

## 2022-06-10 RX ADMIN — INSULIN DETEMIR 10 UNITS: 100 INJECTION, SOLUTION SUBCUTANEOUS at 09:09

## 2022-06-10 RX ADMIN — BUDESONIDE 0.5 MG: 0.5 SUSPENSION RESPIRATORY (INHALATION) at 19:35

## 2022-06-10 RX ADMIN — HYDROCORTISONE 2.5% 1 APPLICATION: 25 CREAM TOPICAL at 21:56

## 2022-06-10 RX ADMIN — INSULIN HUMAN 10 UNITS: 100 INJECTION, SOLUTION PARENTERAL at 13:30

## 2022-06-10 RX ADMIN — Medication 100 MG: at 09:08

## 2022-06-10 RX ADMIN — BUSPIRONE HYDROCHLORIDE 15 MG: 15 TABLET ORAL at 09:08

## 2022-06-10 RX ADMIN — FLUTICASONE PROPIONATE 2 SPRAY: 50 SPRAY, METERED NASAL at 09:29

## 2022-06-10 RX ADMIN — POLYETHYLENE GLYCOL 400 AND PROPYLENE GLYCOL 2 DROP: 4; 3 SOLUTION/ DROPS OPHTHALMIC at 02:35

## 2022-06-10 RX ADMIN — INSULIN LISPRO 2 UNITS: 100 INJECTION, SOLUTION INTRAVENOUS; SUBCUTANEOUS at 17:51

## 2022-06-10 RX ADMIN — BUSPIRONE HYDROCHLORIDE 15 MG: 15 TABLET ORAL at 21:55

## 2022-06-10 RX ADMIN — LACTULOSE 20 G: 20 SOLUTION ORAL at 21:54

## 2022-06-10 RX ADMIN — FOLIC ACID 1 MG: 1 TABLET ORAL at 09:08

## 2022-06-10 RX ADMIN — OXYCODONE HYDROCHLORIDE 10 MG: 5 TABLET ORAL at 17:51

## 2022-06-10 RX ADMIN — ATORVASTATIN CALCIUM 40 MG: 40 TABLET, FILM COATED ORAL at 21:54

## 2022-06-10 RX ADMIN — CETIRIZINE HYDROCHLORIDE 10 MG: 10 TABLET, FILM COATED ORAL at 09:08

## 2022-06-10 RX ADMIN — OXYCODONE HYDROCHLORIDE 10 MG: 5 TABLET ORAL at 09:26

## 2022-06-10 RX ADMIN — BUDESONIDE 0.5 MG: 0.5 SUSPENSION RESPIRATORY (INHALATION) at 07:59

## 2022-06-10 RX ADMIN — POLYETHYLENE GLYCOL 400 AND PROPYLENE GLYCOL 2 DROP: 4; 3 SOLUTION/ DROPS OPHTHALMIC at 04:42

## 2022-06-10 RX ADMIN — OXYCODONE HYDROCHLORIDE 10 MG: 5 TABLET ORAL at 14:14

## 2022-06-10 RX ADMIN — SPIRONOLACTONE 100 MG: 25 TABLET ORAL at 09:08

## 2022-06-10 RX ADMIN — SERTRALINE HYDROCHLORIDE 50 MG: 50 TABLET ORAL at 09:08

## 2022-06-10 RX ADMIN — LACTULOSE 20 G: 20 SOLUTION ORAL at 09:08

## 2022-06-10 RX ADMIN — OXYCODONE HYDROCHLORIDE 10 MG: 5 TABLET ORAL at 01:07

## 2022-06-10 RX ADMIN — CALCIUM GLUCONATE 1 G: 20 INJECTION, SOLUTION INTRAVENOUS at 13:32

## 2022-06-10 RX ADMIN — FAMOTIDINE 20 MG: 20 TABLET ORAL at 07:17

## 2022-06-10 RX ADMIN — FUROSEMIDE 40 MG: 40 TABLET ORAL at 21:55

## 2022-06-10 RX ADMIN — OXYCODONE HYDROCHLORIDE 10 MG: 5 TABLET ORAL at 21:54

## 2022-06-10 RX ADMIN — SIMETHICONE 80 MG: 80 TABLET, CHEWABLE ORAL at 09:08

## 2022-06-10 NOTE — PLAN OF CARE
Goal Outcome Evaluation:  Plan of Care Reviewed With: patient        Progress: no change  Outcome Evaluation: Patient continues to frequently call out to staff for various requests. PRN pain, nausea, abdominal gas and dry eye meds given per MAR. Patient slept some of shift, but restless at times.

## 2022-06-10 NOTE — PROGRESS NOTES
[facility]   Hospitalist Progress Note  Date: 6/10/2022  Patient Name: Nic Nicolas  : 1965  MRN: [id number]  Date of admission: 2022      Subjective   Subjective     Chief Complaint: Follow up for altered mental status    Interval Followup: No events overnight.  Patient complaining of bloating this morning.  I told him he has simethicone on his MAR and just ask for it.  He is ambulating around the room without issue.  He tells me his pain is well controlled.  We did go over his labs as he was interested in knowing his ammonia level today.  His blood pressure remained stable.  He did have an elevated potassium this morning that was treated medically.       Review of Systems  All systems reviewed and are negative except as above in HPI.    Objective   Objective     Vitals:   Temp:  [97.6 °F (36.4 °C)-98.8 °F (37.1 °C)] 97.6 °F (36.4 °C)  Heart Rate:  [76-93] 77  Resp:  [18-20] 20  BP: (125-161)/(60-65) 161/60  Physical Exam   Constitutional: Alert, awake, in distress due to feeling bloated  HEENT:  PERRLA, EOMI; No Scleral icterus  Neck:  Supple; No Mass, No Lymphadenopathy  Cardiovascular:  No Rubs, No Edema, No JVD   Respiratory: No respiratory distress, unlabored breathing, saturating well on room air  Abdomen:  Normal BS all 4 Quadrants; No Guarding, No Tenderness  Musculoskeletal:  Pulses Positive all 4 Ext; No Cyanosis, No Edema  Neurological: No facial asymmetry, answers questions, intermittently confused, moves all extremities  Psychiatry: Patient still with pressured speech, not agitated  Skin:  No Rash, No Cellulitis, No Erythema    Result Review    Result Review:  I have personally reviewed the results from the time of this admission to 6/10/2022 07:11 EDT and agree with these findings:  [x]  Laboratory  CBC    CBC 6/8/22 6/9/22 6/10/22   WBC 5.15 3.41 4.25   RBC 3.00 (A) 2.67 (A) 2.97 (A)   Hemoglobin 9.8 (A) 8.8 (A) 9.7 (A)   Hematocrit 28.8 (A) 25.8 (A) 31.2 (A)   MCV 96.0 96.6  105.1 (A)   MCH 32.7 33.0 32.7   MCHC 34.0 34.1 31.1 (A)   RDW 15.9 (A) 16.0 (A) 16.0 (A)   Platelets 71 (A) 54 (A) 50 (A)   (A) Abnormal value            BMP    BMP 6/8/22 6/9/22 6/10/22   BUN 18 17 17   Creatinine 0.99 0.87 0.98   Sodium 132 (A) 134 (A) 132 (A)   Potassium 4.2 4.2 5.4 (A)   Chloride 100 103 103   CO2 21.3 (A) 22.6 17.6 (A)   Calcium 9.2 8.5 (A) 8.9   (A) Abnormal value       Comments are available for some flowsheets but are not being displayed.           [x]  Microbiology   []  Radiology  []  EKG/Telemetry   []  Cardiology/Vascular   []  Pathology  []  Old records  [x]  Other:     Intake/Output Summary (Last 24 hours) at 6/10/2022 0711  Last data filed at 6/9/2022 2025  Gross per 24 hour   Intake 1400 ml   Output --   Net 1400 ml         Assessment & Plan   Assessment / Plan     Assessment:  Hyperactive delirium  Minimally comminuted nondisplaced fracture of the greater tuberosity of the proximal humerus.  Minimally displaced fracture at the inferior glenoid.  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  Hepatic encephalopathy  ROMO cirrhosis  Pancytopenia likely related to cirrhosis  Insulin-dependent diabetes mellitus - blood sugars controlled  Obstructive sleep apnea not on home CPAP  History of traumatic brain injury  Mild hyperbilirubinemia  Major depression with suicidal ideation  Acute Respiratory Alkalosis , resolved  Thrombocytopenia   Dyslipidemia  Acute kidney injury secondary to prerenal etiology, resolved      Plan:   Potassium elevated's morning, will give bicarb, calcium, insulin and dextrose, repeat BMP later today  Continue with delirium precautions  Continue Seroquel, Haldol as needed for agitation  Continue with trazodone as needed at night for insomnia  Continue with lactulose and rifaximin, patient needs at least 2-3 bowel movements daily, can hold once he has 2 bowel movements  Psychiatry recommendations appreciated, continue BuSpar 15 mg twice daily along with Zoloft 50 mg daily,  currently seeing as needed  Continue thiamine folic acid daily  Continue with Levemir along with correctional insulin  Platelet count remains stable  Patient continues to diurese well, renal function stable, continue with furosemide twice daily and spironolactone daily  Case management assistance appreciated, patient will guide placement as his family is unable to care for him, appreciate assistance  Continue trazodone as needed at night  Continue with eyedrops  Continue with Anusol  Labs reviewed, image reviewed, medications reviewed, vital signs reviewed  Further recommendations pending clinical course    Discussed with RN.    DVT prophylaxis:  Mechanical DVT prophylaxis orders are present.    CODE STATUS:   Code Status (Patient has no pulse and is not breathing): CPR (Attempt to Resuscitate)  Medical Interventions (Patient has pulse or is breathing): Full Support    Electronically signed by Denzel Bolanos DO, 06/10/22, 7:11 AM EDT.

## 2022-06-11 LAB
ALBUMIN SERPL-MCNC: 3.1 G/DL (ref 3.5–5.2)
ALBUMIN/GLOB SERPL: 1 G/DL
ALP SERPL-CCNC: 131 U/L (ref 39–117)
ALT SERPL W P-5'-P-CCNC: 32 U/L (ref 1–41)
ANION GAP SERPL CALCULATED.3IONS-SCNC: 10.4 MMOL/L (ref 5–15)
AST SERPL-CCNC: 48 U/L (ref 1–40)
BASOPHILS # BLD AUTO: 0.03 10*3/MM3 (ref 0–0.2)
BASOPHILS NFR BLD AUTO: 0.8 % (ref 0–1.5)
BILIRUB SERPL-MCNC: 0.9 MG/DL (ref 0–1.2)
BUN SERPL-MCNC: 16 MG/DL (ref 6–20)
BUN/CREAT SERPL: 16.3 (ref 7–25)
CALCIUM SPEC-SCNC: 8.7 MG/DL (ref 8.6–10.5)
CHLORIDE SERPL-SCNC: 100 MMOL/L (ref 98–107)
CO2 SERPL-SCNC: 22.6 MMOL/L (ref 22–29)
CREAT SERPL-MCNC: 0.98 MG/DL (ref 0.76–1.27)
DEPRECATED RDW RBC AUTO: 56.7 FL (ref 37–54)
EGFRCR SERPLBLD CKD-EPI 2021: 90.5 ML/MIN/1.73
EOSINOPHIL # BLD AUTO: 0.12 10*3/MM3 (ref 0–0.4)
EOSINOPHIL NFR BLD AUTO: 3 % (ref 0.3–6.2)
ERYTHROCYTE [DISTWIDTH] IN BLOOD BY AUTOMATED COUNT: 16 % (ref 12.3–15.4)
GLOBULIN UR ELPH-MCNC: 3.2 GM/DL
GLUCOSE BLDC GLUCOMTR-MCNC: 131 MG/DL (ref 70–99)
GLUCOSE BLDC GLUCOMTR-MCNC: 152 MG/DL (ref 70–99)
GLUCOSE BLDC GLUCOMTR-MCNC: 169 MG/DL (ref 70–99)
GLUCOSE SERPL-MCNC: 231 MG/DL (ref 65–99)
HCT VFR BLD AUTO: 28.5 % (ref 37.5–51)
HGB BLD-MCNC: 9.5 G/DL (ref 13–17.7)
IMM GRANULOCYTES # BLD AUTO: 0.02 10*3/MM3 (ref 0–0.05)
IMM GRANULOCYTES NFR BLD AUTO: 0.5 % (ref 0–0.5)
LYMPHOCYTES # BLD AUTO: 0.66 10*3/MM3 (ref 0.7–3.1)
LYMPHOCYTES NFR BLD AUTO: 16.7 % (ref 19.6–45.3)
MAGNESIUM SERPL-MCNC: 1.4 MG/DL (ref 1.6–2.6)
MCH RBC QN AUTO: 32.3 PG (ref 26.6–33)
MCHC RBC AUTO-ENTMCNC: 33.3 G/DL (ref 31.5–35.7)
MCV RBC AUTO: 96.9 FL (ref 79–97)
MONOCYTES # BLD AUTO: 0.37 10*3/MM3 (ref 0.1–0.9)
MONOCYTES NFR BLD AUTO: 9.4 % (ref 5–12)
NEUTROPHILS NFR BLD AUTO: 2.75 10*3/MM3 (ref 1.7–7)
NEUTROPHILS NFR BLD AUTO: 69.6 % (ref 42.7–76)
NRBC BLD AUTO-RTO: 0 /100 WBC (ref 0–0.2)
PHOSPHATE SERPL-MCNC: 4 MG/DL (ref 2.5–4.5)
PLATELET # BLD AUTO: 61 10*3/MM3 (ref 140–450)
PMV BLD AUTO: 10.2 FL (ref 6–12)
POTASSIUM SERPL-SCNC: 4.5 MMOL/L (ref 3.5–5.2)
PROT SERPL-MCNC: 6.3 G/DL (ref 6–8.5)
RBC # BLD AUTO: 2.94 10*6/MM3 (ref 4.14–5.8)
SODIUM SERPL-SCNC: 133 MMOL/L (ref 136–145)
WBC NRBC COR # BLD: 3.95 10*3/MM3 (ref 3.4–10.8)

## 2022-06-11 PROCEDURE — 63710000001 INSULIN DETEMIR PER 5 UNITS: Performed by: FAMILY MEDICINE

## 2022-06-11 PROCEDURE — 84100 ASSAY OF PHOSPHORUS: CPT | Performed by: STUDENT IN AN ORGANIZED HEALTH CARE EDUCATION/TRAINING PROGRAM

## 2022-06-11 PROCEDURE — 94799 UNLISTED PULMONARY SVC/PX: CPT

## 2022-06-11 PROCEDURE — 82962 GLUCOSE BLOOD TEST: CPT

## 2022-06-11 PROCEDURE — 25010000002 MAGNESIUM SULFATE 2 GM/50ML SOLUTION: Performed by: STUDENT IN AN ORGANIZED HEALTH CARE EDUCATION/TRAINING PROGRAM

## 2022-06-11 PROCEDURE — 85025 COMPLETE CBC W/AUTO DIFF WBC: CPT | Performed by: STUDENT IN AN ORGANIZED HEALTH CARE EDUCATION/TRAINING PROGRAM

## 2022-06-11 PROCEDURE — 80053 COMPREHEN METABOLIC PANEL: CPT | Performed by: STUDENT IN AN ORGANIZED HEALTH CARE EDUCATION/TRAINING PROGRAM

## 2022-06-11 PROCEDURE — 99233 SBSQ HOSP IP/OBS HIGH 50: CPT | Performed by: STUDENT IN AN ORGANIZED HEALTH CARE EDUCATION/TRAINING PROGRAM

## 2022-06-11 PROCEDURE — 25010000002 ONDANSETRON PER 1 MG: Performed by: HOSPITALIST

## 2022-06-11 PROCEDURE — 83735 ASSAY OF MAGNESIUM: CPT | Performed by: STUDENT IN AN ORGANIZED HEALTH CARE EDUCATION/TRAINING PROGRAM

## 2022-06-11 PROCEDURE — 63710000001 INSULIN LISPRO (HUMAN) PER 5 UNITS: Performed by: FAMILY MEDICINE

## 2022-06-11 RX ORDER — LACTULOSE 10 G/15ML
30 SOLUTION ORAL 3 TIMES DAILY
Status: DISCONTINUED | OUTPATIENT
Start: 2022-06-11 | End: 2022-06-17 | Stop reason: HOSPADM

## 2022-06-11 RX ORDER — MAGNESIUM SULFATE HEPTAHYDRATE 40 MG/ML
2 INJECTION, SOLUTION INTRAVENOUS ONCE
Status: COMPLETED | OUTPATIENT
Start: 2022-06-11 | End: 2022-06-11

## 2022-06-11 RX ADMIN — POLYETHYLENE GLYCOL 400 AND PROPYLENE GLYCOL 2 DROP: 4; 3 SOLUTION/ DROPS OPHTHALMIC at 01:03

## 2022-06-11 RX ADMIN — FAMOTIDINE 20 MG: 20 TABLET ORAL at 17:44

## 2022-06-11 RX ADMIN — FOLIC ACID 1 MG: 1 TABLET ORAL at 10:57

## 2022-06-11 RX ADMIN — LACTULOSE 20 G: 20 SOLUTION ORAL at 08:45

## 2022-06-11 RX ADMIN — HYDROCORTISONE 2.5%: 25 CREAM TOPICAL at 08:47

## 2022-06-11 RX ADMIN — QUETIAPINE FUMARATE 50 MG: 25 TABLET ORAL at 01:16

## 2022-06-11 RX ADMIN — ATORVASTATIN CALCIUM 40 MG: 40 TABLET, FILM COATED ORAL at 20:44

## 2022-06-11 RX ADMIN — SIMETHICONE 80 MG: 80 TABLET, CHEWABLE ORAL at 20:44

## 2022-06-11 RX ADMIN — TRAZODONE HYDROCHLORIDE 50 MG: 50 TABLET ORAL at 20:44

## 2022-06-11 RX ADMIN — SPIRONOLACTONE 100 MG: 25 TABLET ORAL at 08:46

## 2022-06-11 RX ADMIN — Medication 10 ML: at 20:44

## 2022-06-11 RX ADMIN — BUSPIRONE HYDROCHLORIDE 15 MG: 15 TABLET ORAL at 08:46

## 2022-06-11 RX ADMIN — OXYCODONE HYDROCHLORIDE 10 MG: 5 TABLET ORAL at 06:07

## 2022-06-11 RX ADMIN — FUROSEMIDE 40 MG: 40 TABLET ORAL at 08:46

## 2022-06-11 RX ADMIN — FUROSEMIDE 40 MG: 40 TABLET ORAL at 20:44

## 2022-06-11 RX ADMIN — ONDANSETRON 4 MG: 2 INJECTION INTRAMUSCULAR; INTRAVENOUS at 10:53

## 2022-06-11 RX ADMIN — MAGNESIUM SULFATE 2 G: 2 INJECTION INTRAVENOUS at 09:17

## 2022-06-11 RX ADMIN — OXYCODONE HYDROCHLORIDE 10 MG: 5 TABLET ORAL at 20:44

## 2022-06-11 RX ADMIN — SIMETHICONE 80 MG: 80 TABLET, CHEWABLE ORAL at 01:17

## 2022-06-11 RX ADMIN — OXYCODONE HYDROCHLORIDE 10 MG: 5 TABLET ORAL at 02:18

## 2022-06-11 RX ADMIN — OXYCODONE HYDROCHLORIDE 10 MG: 5 TABLET ORAL at 15:29

## 2022-06-11 RX ADMIN — SERTRALINE HYDROCHLORIDE 50 MG: 50 TABLET ORAL at 08:46

## 2022-06-11 RX ADMIN — Medication 10 ML: at 08:47

## 2022-06-11 RX ADMIN — HYDROCORTISONE 2.5%: 25 CREAM TOPICAL at 20:43

## 2022-06-11 RX ADMIN — BUDESONIDE 0.5 MG: 0.5 SUSPENSION RESPIRATORY (INHALATION) at 21:24

## 2022-06-11 RX ADMIN — BUSPIRONE HYDROCHLORIDE 15 MG: 15 TABLET ORAL at 20:45

## 2022-06-11 RX ADMIN — Medication 100 MG: at 08:46

## 2022-06-11 RX ADMIN — INSULIN DETEMIR 10 UNITS: 100 INJECTION, SOLUTION SUBCUTANEOUS at 08:47

## 2022-06-11 RX ADMIN — BUDESONIDE 0.5 MG: 0.5 SUSPENSION RESPIRATORY (INHALATION) at 07:27

## 2022-06-11 RX ADMIN — INSULIN LISPRO 2 UNITS: 100 INJECTION, SOLUTION INTRAVENOUS; SUBCUTANEOUS at 08:47

## 2022-06-11 RX ADMIN — OXYCODONE HYDROCHLORIDE 10 MG: 5 TABLET ORAL at 10:47

## 2022-06-11 RX ADMIN — FLUTICASONE PROPIONATE 2 SPRAY: 50 SPRAY, METERED NASAL at 08:47

## 2022-06-11 RX ADMIN — LACTULOSE 30 G: 20 SOLUTION ORAL at 20:43

## 2022-06-11 RX ADMIN — FAMOTIDINE 20 MG: 20 TABLET ORAL at 06:07

## 2022-06-11 RX ADMIN — CETIRIZINE HYDROCHLORIDE 10 MG: 10 TABLET, FILM COATED ORAL at 08:46

## 2022-06-11 RX ADMIN — ONDANSETRON 4 MG: 2 INJECTION INTRAMUSCULAR; INTRAVENOUS at 02:35

## 2022-06-11 RX ADMIN — SIMETHICONE 80 MG: 80 TABLET, CHEWABLE ORAL at 10:57

## 2022-06-11 NOTE — PLAN OF CARE
Goal Outcome Evaluation:  Plan of Care Reviewed With: patient   Pt ad myrtle, gets anxious, freq pain meds,

## 2022-06-11 NOTE — PLAN OF CARE
Goal Outcome Evaluation:  Plan of Care Reviewed With: patient      Pt has been very anxious today, has flight of ideas, speaks loudly, gets upset easily if he doesn't get what he wants, he has taken a shower today and yesterday, washed his clothes in the sink, after meals he washes his lunch/dinner plates and silverware in the hand sink, wraps the silverware with paper towels and their are placed in the cubby by the safe. his arms are wrapped with paper towels and he taped them to his wrist, he has not sleep this shift, have encouraged him to nap, he lays down and 10-15 mins he is up folding his clothes, making bed, folding blankets, sheets. Tried to set boundaries he agrees to a plan but immediately moves on to something else.

## 2022-06-11 NOTE — PROGRESS NOTES
[facility]   Hospitalist Progress Note  Date: 2022  Patient Name: Nic Nicolas  : 1965  MRN: [id number]  Date of admission: 2022      Subjective   Subjective     Chief Complaint: Follow up for altered mental status    Interval Followup: Patient using the bathroom upon his room.  He tells me that he needs to have a bowel movement today.  I told him that we originally held his lactulose because he was having numerous bowel movements.  He requested his lactulose be started back 3 times daily.  He has not had any fevers or chills.  He notes his pain is well controlled.  He did not mention his abdominal bloating today.  He still does appear manic at times.  With me the patient finished using the bathroom and got back into bed and appeared overall calm as compared to previous days.  He denies any chest pain, chest pressure or palpitations.  He is tolerating oral intake without nausea or emesis.  Denies any dose morning, subsequently replenished.      Review of Systems  All systems reviewed and are negative except as above in HPI.    Objective   Objective     Vitals:   Temp:  [97.3 °F (36.3 °C)-98.4 °F (36.9 °C)] 97.6 °F (36.4 °C)  Heart Rate:  [70-87] 76  Resp:  [18-20] 20  BP: (111-137)/(48-68) 132/66  Physical Exam   Constitutional: Alert, awake, appears weak  HEENT:  PERRLA, EOMI; No Scleral icterus  Neck:  Supple; No Mass, No Lymphadenopathy  Cardiovascular:  No Rubs, No Edema, No JVD   Respiratory: No respiratory distress, unlabored breathing, saturating well on room air  Abdomen:  Normal BS all 4 Quadrants; No Guarding, No Tenderness  Musculoskeletal:  Pulses Positive all 4 Ext; No Cyanosis, No Edema  Neurological: No facial asymmetry, answers questions, intermittently confused, moves all extremities  Psychiatry: No agitation, intermittently hyperactive l  Skin:  No Rash, No Cellulitis, No Erythema    Result Review    Result Review:  I have personally reviewed the results from the time of  this admission to 6/11/2022 08:30 EDT and agree with these findings:  [x]  Laboratory  CBC    CBC 6/9/22 6/10/22 6/11/22   WBC 3.41 4.25 3.95   RBC 2.67 (A) 2.97 (A) 2.94 (A)   Hemoglobin 8.8 (A) 9.7 (A) 9.5 (A)   Hematocrit 25.8 (A) 31.2 (A) 28.5 (A)   MCV 96.6 105.1 (A) 96.9   MCH 33.0 32.7 32.3   MCHC 34.1 31.1 (A) 33.3   RDW 16.0 (A) 16.0 (A) 16.0 (A)   Platelets 54 (A) 50 (A) 61 (A)   (A) Abnormal value            BMP    BMP 6/9/22 6/10/22 6/10/22 6/11/22     0506 2208    BUN 17 17 16 16   Creatinine 0.87 0.98 1.06 0.98   Sodium 134 (A) 132 (A) 134 (A) 133 (A)   Potassium 4.2 5.4 (A) 4.5 4.5   Chloride 103 103 99 100   CO2 22.6 17.6 (A) 25.4 22.6   Calcium 8.5 (A) 8.9 8.9 8.7   (A) Abnormal value       Comments are available for some flowsheets but are not being displayed.           [x]  Microbiology   []  Radiology  []  EKG/Telemetry   []  Cardiology/Vascular   []  Pathology  []  Old records  [x]  Other:     Intake/Output Summary (Last 24 hours) at 6/11/2022 0830  Last data filed at 6/10/2022 1230  Gross per 24 hour   Intake 480 ml   Output --   Net 480 ml         Assessment & Plan   Assessment / Plan     Assessment:  Hyperactive delirium  Minimally comminuted nondisplaced fracture of the greater tuberosity of the proximal humerus.  Minimally displaced fracture at the inferior glenoid.  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  Hepatic encephalopathy  ROMO cirrhosis  Pancytopenia likely related to cirrhosis  Insulin-dependent diabetes mellitus - blood sugars controlled  Obstructive sleep apnea not on home CPAP  History of traumatic brain injury  Mild hyperbilirubinemia  Major depression with suicidal ideation  Acute Respiratory Alkalosis , resolved  Thrombocytopenia   Dyslipidemia  Acute kidney injury secondary to prerenal etiology, resolved      Plan:   Potassium level stabilized today, continue to monitor  Magnesium low, subsequently replenished  Continue with delirium precautions  Continue Seroquel, Haldol as  needed for agitation  Continue with trazodone as needed at night for insomnia  Continue with lactulose and rifaximin, patient needs at least 2-3 bowel movements daily, can hold once he has 2 bowel movements  Psychiatry recommendations appreciated, continue BuSpar 15 mg twice daily along with Zoloft 50 mg daily, currently seeing as needed  Continue thiamine folic acid daily  Continue with Levemir along with correctional insulin  Platelet count remains stable  Patient continues to diurese well, renal function stable, continue with furosemide twice daily and spironolactone daily  Case management assistance appreciated, patient will guide placement as his family is unable to care for him, appreciate assistance  Continue trazodone as needed at night  Continue with eyedrops  Continue with Anusol  Labs reviewed, image reviewed, medications reviewed, vital signs reviewed  Further recommendations pending clinical course    Discussed with RN.    DVT prophylaxis:  Mechanical DVT prophylaxis orders are present.    CODE STATUS:   Code Status (Patient has no pulse and is not breathing): CPR (Attempt to Resuscitate)  Medical Interventions (Patient has pulse or is breathing): Full Support    Electronically signed by Denzel Bolanos DO, 06/11/22, 8:30 AM EDT.

## 2022-06-12 LAB
ALBUMIN SERPL-MCNC: 3.3 G/DL (ref 3.5–5.2)
ALBUMIN/GLOB SERPL: 1.1 G/DL
ALP SERPL-CCNC: 113 U/L (ref 39–117)
ALT SERPL W P-5'-P-CCNC: 29 U/L (ref 1–41)
ANION GAP SERPL CALCULATED.3IONS-SCNC: 7.4 MMOL/L (ref 5–15)
AST SERPL-CCNC: 48 U/L (ref 1–40)
BILIRUB SERPL-MCNC: 1.1 MG/DL (ref 0–1.2)
BUN SERPL-MCNC: 22 MG/DL (ref 6–20)
BUN/CREAT SERPL: 24.7 (ref 7–25)
CALCIUM SPEC-SCNC: 8.9 MG/DL (ref 8.6–10.5)
CHLORIDE SERPL-SCNC: 97 MMOL/L (ref 98–107)
CO2 SERPL-SCNC: 26.6 MMOL/L (ref 22–29)
CREAT SERPL-MCNC: 0.89 MG/DL (ref 0.76–1.27)
EGFRCR SERPLBLD CKD-EPI 2021: 100.6 ML/MIN/1.73
GLOBULIN UR ELPH-MCNC: 3.1 GM/DL
GLUCOSE BLDC GLUCOMTR-MCNC: 104 MG/DL (ref 70–99)
GLUCOSE BLDC GLUCOMTR-MCNC: 118 MG/DL (ref 70–99)
GLUCOSE BLDC GLUCOMTR-MCNC: 130 MG/DL (ref 70–99)
GLUCOSE SERPL-MCNC: 146 MG/DL (ref 65–99)
MAGNESIUM SERPL-MCNC: 1.6 MG/DL (ref 1.6–2.6)
POTASSIUM SERPL-SCNC: 4.8 MMOL/L (ref 3.5–5.2)
PROT SERPL-MCNC: 6.4 G/DL (ref 6–8.5)
SODIUM SERPL-SCNC: 131 MMOL/L (ref 136–145)

## 2022-06-12 PROCEDURE — 94799 UNLISTED PULMONARY SVC/PX: CPT

## 2022-06-12 PROCEDURE — 63710000001 INSULIN DETEMIR PER 5 UNITS: Performed by: FAMILY MEDICINE

## 2022-06-12 PROCEDURE — 80053 COMPREHEN METABOLIC PANEL: CPT | Performed by: STUDENT IN AN ORGANIZED HEALTH CARE EDUCATION/TRAINING PROGRAM

## 2022-06-12 PROCEDURE — 83735 ASSAY OF MAGNESIUM: CPT | Performed by: STUDENT IN AN ORGANIZED HEALTH CARE EDUCATION/TRAINING PROGRAM

## 2022-06-12 PROCEDURE — 99232 SBSQ HOSP IP/OBS MODERATE 35: CPT | Performed by: STUDENT IN AN ORGANIZED HEALTH CARE EDUCATION/TRAINING PROGRAM

## 2022-06-12 PROCEDURE — 82962 GLUCOSE BLOOD TEST: CPT

## 2022-06-12 PROCEDURE — 25010000002 ONDANSETRON PER 1 MG: Performed by: HOSPITALIST

## 2022-06-12 RX ORDER — HYDROXYZINE HYDROCHLORIDE 25 MG/1
25 TABLET, FILM COATED ORAL 3 TIMES DAILY PRN
Status: DISCONTINUED | OUTPATIENT
Start: 2022-06-12 | End: 2022-06-17 | Stop reason: HOSPADM

## 2022-06-12 RX ADMIN — SERTRALINE HYDROCHLORIDE 50 MG: 50 TABLET ORAL at 08:42

## 2022-06-12 RX ADMIN — SPIRONOLACTONE 100 MG: 25 TABLET ORAL at 08:42

## 2022-06-12 RX ADMIN — Medication 10 ML: at 22:29

## 2022-06-12 RX ADMIN — MUPIROCIN 1 APPLICATION: 20 OINTMENT TOPICAL at 22:29

## 2022-06-12 RX ADMIN — LACTULOSE 30 G: 20 SOLUTION ORAL at 22:26

## 2022-06-12 RX ADMIN — FLUTICASONE PROPIONATE 2 SPRAY: 50 SPRAY, METERED NASAL at 08:45

## 2022-06-12 RX ADMIN — OXYCODONE HYDROCHLORIDE 10 MG: 5 TABLET ORAL at 01:57

## 2022-06-12 RX ADMIN — POLYETHYLENE GLYCOL 400 AND PROPYLENE GLYCOL 2 DROP: 4; 3 SOLUTION/ DROPS OPHTHALMIC at 08:44

## 2022-06-12 RX ADMIN — OXYCODONE HYDROCHLORIDE 10 MG: 5 TABLET ORAL at 10:51

## 2022-06-12 RX ADMIN — CETIRIZINE HYDROCHLORIDE 10 MG: 10 TABLET, FILM COATED ORAL at 08:43

## 2022-06-12 RX ADMIN — ATORVASTATIN CALCIUM 40 MG: 40 TABLET, FILM COATED ORAL at 22:27

## 2022-06-12 RX ADMIN — FAMOTIDINE 20 MG: 20 TABLET ORAL at 17:03

## 2022-06-12 RX ADMIN — HYDROCORTISONE 2.5%: 25 CREAM TOPICAL at 22:30

## 2022-06-12 RX ADMIN — Medication 10 ML: at 08:43

## 2022-06-12 RX ADMIN — LACTULOSE 30 G: 20 SOLUTION ORAL at 08:43

## 2022-06-12 RX ADMIN — FUROSEMIDE 40 MG: 40 TABLET ORAL at 22:28

## 2022-06-12 RX ADMIN — OXYCODONE HYDROCHLORIDE 10 MG: 5 TABLET ORAL at 16:57

## 2022-06-12 RX ADMIN — FUROSEMIDE 40 MG: 40 TABLET ORAL at 08:43

## 2022-06-12 RX ADMIN — BUSPIRONE HYDROCHLORIDE 15 MG: 15 TABLET ORAL at 08:42

## 2022-06-12 RX ADMIN — ONDANSETRON 4 MG: 2 INJECTION INTRAMUSCULAR; INTRAVENOUS at 08:43

## 2022-06-12 RX ADMIN — BUSPIRONE HYDROCHLORIDE 15 MG: 15 TABLET ORAL at 22:28

## 2022-06-12 RX ADMIN — FAMOTIDINE 20 MG: 20 TABLET ORAL at 06:20

## 2022-06-12 RX ADMIN — BUDESONIDE 0.5 MG: 0.5 SUSPENSION RESPIRATORY (INHALATION) at 20:14

## 2022-06-12 RX ADMIN — LACTULOSE 30 G: 20 SOLUTION ORAL at 17:04

## 2022-06-12 RX ADMIN — OXYCODONE HYDROCHLORIDE 10 MG: 5 TABLET ORAL at 06:20

## 2022-06-12 RX ADMIN — QUETIAPINE FUMARATE 50 MG: 25 TABLET ORAL at 22:28

## 2022-06-12 RX ADMIN — OXYCODONE HYDROCHLORIDE 10 MG: 5 TABLET ORAL at 22:27

## 2022-06-12 RX ADMIN — TRAZODONE HYDROCHLORIDE 50 MG: 50 TABLET ORAL at 22:28

## 2022-06-12 RX ADMIN — Medication 100 MG: at 08:42

## 2022-06-12 RX ADMIN — FOLIC ACID 1 MG: 1 TABLET ORAL at 08:43

## 2022-06-12 RX ADMIN — INSULIN DETEMIR 10 UNITS: 100 INJECTION, SOLUTION SUBCUTANEOUS at 09:05

## 2022-06-12 RX ADMIN — HYDROCORTISONE 2.5%: 25 CREAM TOPICAL at 08:45

## 2022-06-12 NOTE — PROGRESS NOTES
[facility]   Hospitalist Progress Note  Date: 2022  Patient Name: Nic Nicolas  : 1965  MRN: [id number]  Date of admission: 2022      Subjective   Subjective     Chief Complaint: Follow up for altered mental status    Interval Followup: No events overnight.  Patient still remains hyperactive.  He did develop a rash today per nursing.  Upon evaluation patient states that he usually puts a cream on this rash and it resolves.  I told him appears to be folliculitis and he was encouraged to exfoliate while sharing.  Additionally told patient we will add cream and medication for itching.  He denies any fevers or chills.  He is tolerating oral intake without nausea or emesis.  He denies any chest pain, chest pressure, palpitations.      Review of Systems  All systems reviewed and are negative except as above in HPI.    Objective   Objective     Vitals:   Temp:  [97.3 °F (36.3 °C)-98.8 °F (37.1 °C)] 97.9 °F (36.6 °C)  Heart Rate:  [77-86] 86  Resp:  [18] 18  BP: (114-144)/(53-69) 125/59  Physical Exam   Constitutional: Alert, awake, appears weak  HEENT:  PERRLA, EOMI; No Scleral icterus  Neck:  Supple; No Mass, No Lymphadenopathy  Cardiovascular:  No Rubs, No Edema, No JVD   Respiratory: No respiratory distress, unlabored breathing, saturating well on room air  Abdomen:  Normal BS all 4 Quadrants; No Guarding, No Tenderness  Musculoskeletal:  Pulses Positive all 4 Ext; No Cyanosis, No Edema  Neurological: No facial asymmetry, answers questions, intermittently confused, moves all extremities  Psychiatry: No agitation, intermittently hyperactive l  Skin:  No Rash, No Cellulitis, No Erythema    Result Review    Result Review:  I have personally reviewed the results from the time of this admission to 2022 18:12 EDT and agree with these findings:  [x]  Laboratory  CBC    CBC 6/9/22 6/10/22 6/11/22   WBC 3.41 4.25 3.95   RBC 2.67 (A) 2.97 (A) 2.94 (A)   Hemoglobin 8.8 (A) 9.7 (A) 9.5 (A)    Hematocrit 25.8 (A) 31.2 (A) 28.5 (A)   MCV 96.6 105.1 (A) 96.9   MCH 33.0 32.7 32.3   MCHC 34.1 31.1 (A) 33.3   RDW 16.0 (A) 16.0 (A) 16.0 (A)   Platelets 54 (A) 50 (A) 61 (A)   (A) Abnormal value            BMP    BMP 6/10/22 6/10/22 6/11/22 6/12/22    0506 2208     BUN 17 16 16 22 (A)   Creatinine 0.98 1.06 0.98 0.89   Sodium 132 (A) 134 (A) 133 (A) 131 (A)   Potassium 5.4 (A) 4.5 4.5 4.8   Chloride 103 99 100 97 (A)   CO2 17.6 (A) 25.4 22.6 26.6   Calcium 8.9 8.9 8.7 8.9   (A) Abnormal value       Comments are available for some flowsheets but are not being displayed.           [x]  Microbiology   []  Radiology  []  EKG/Telemetry   []  Cardiology/Vascular   []  Pathology  []  Old records  [x]  Other:     Intake/Output Summary (Last 24 hours) at 6/12/2022 1812  Last data filed at 6/12/2022 1300  Gross per 24 hour   Intake 480 ml   Output --   Net 480 ml         Assessment & Plan   Assessment / Plan     Assessment:  Hyperactive delirium  Minimally comminuted nondisplaced fracture of the greater tuberosity of the proximal humerus.  Minimally displaced fracture at the inferior glenoid.  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  Hepatic encephalopathy  ROMO cirrhosis  Pancytopenia likely related to cirrhosis  Insulin-dependent diabetes mellitus - blood sugars controlled  Obstructive sleep apnea not on home CPAP  History of traumatic brain injury  Mild hyperbilirubinemia  Major depression with suicidal ideation  Acute Respiratory Alkalosis , resolved  Thrombocytopenia   Dyslipidemia  Acute kidney injury secondary to prerenal etiology, resolved  Folliculitis      Plan:   Will give Atarax for rash, mupirocin cream, continue to monitor  Continue with delirium precautions  Continue Seroquel, Haldol as needed for agitation  Continue with trazodone as needed at night for insomnia  Continue with lactulose and rifaximin, patient needs at least 2-3 bowel movements daily, can hold once he has 2 bowel movements  Psychiatry  recommendations appreciated, continue BuSpar 15 mg twice daily along with Zoloft 50 mg daily, currently seeing as needed  We will see if we can get psych to reevaluate tomorrow given his persistent yana  Continue thiamine folic acid daily  Continue with Levemir along with correctional insulin  Platelet count remains stable  Patient continues to diurese well, renal function stable, continue with furosemide twice daily and spironolactone daily  Case management assistance appreciated, patient will guide placement as his family is unable to care for him, appreciate assistance  Continue trazodone as needed at night  Continue with eyedrops  Continue with Anusol  Labs reviewed, image reviewed, medications reviewed, vital signs reviewed  Further recommendations pending clinical course    Discussed with RN.    DVT prophylaxis:  Mechanical DVT prophylaxis orders are present.    CODE STATUS:   Code Status (Patient has no pulse and is not breathing): CPR (Attempt to Resuscitate)  Medical Interventions (Patient has pulse or is breathing): Full Support    Electronically signed by Denzel Bolanos DO, 06/12/22, 6:13 PM EDT.

## 2022-06-12 NOTE — PLAN OF CARE
Goal Outcome Evaluation:  Plan of Care Reviewed With: patient        Progress: no change Pt is awake most of the night. Pt c/o pain calves. Muscle in calves is tight . Pt c/o discomfort regarding. Giving pain medication see Emar,  Pt is stating having numbness, tingling and burning in lower legs and feet. Informed MD. Will continue to monitor pt. Receiving Lasix po scheduled. Potassium level WNL. No potassium supplement noted. Pt stated was going to lay down and try to rest.

## 2022-06-12 NOTE — PLAN OF CARE
Goal Outcome Evaluation:  Plan of Care Reviewed With: patient      Pt has been anxious today talks fast, rambles on, gets fixated on things, compulsive, asked for 3 towels, 4 wash clothes, 3 soaps, for shower, regularly ask for pain meds, keeps track of time due, easily upset if not given when asked for, did sleep about 1 hour undisturbed, developed rash on abd and upper thighs, MD aware, PRN cream and Atarax ordered. Wants Pepsi and Sprite, educated him on need for Diet soda due to his DM, tried to set boundaries, gets upset easily when doesn't get what he wants.but is easy to redirect

## 2022-06-13 LAB
GLUCOSE BLDC GLUCOMTR-MCNC: 123 MG/DL (ref 70–99)
GLUCOSE BLDC GLUCOMTR-MCNC: 168 MG/DL (ref 70–99)
GLUCOSE BLDC GLUCOMTR-MCNC: 169 MG/DL (ref 70–99)

## 2022-06-13 PROCEDURE — 63710000001 INSULIN DETEMIR PER 5 UNITS: Performed by: FAMILY MEDICINE

## 2022-06-13 PROCEDURE — 63710000001 INSULIN LISPRO (HUMAN) PER 5 UNITS: Performed by: FAMILY MEDICINE

## 2022-06-13 PROCEDURE — 94799 UNLISTED PULMONARY SVC/PX: CPT

## 2022-06-13 PROCEDURE — 99232 SBSQ HOSP IP/OBS MODERATE 35: CPT | Performed by: STUDENT IN AN ORGANIZED HEALTH CARE EDUCATION/TRAINING PROGRAM

## 2022-06-13 PROCEDURE — 82962 GLUCOSE BLOOD TEST: CPT

## 2022-06-13 RX ADMIN — Medication 10 ML: at 08:57

## 2022-06-13 RX ADMIN — OXYCODONE HYDROCHLORIDE 10 MG: 5 TABLET ORAL at 05:00

## 2022-06-13 RX ADMIN — FOLIC ACID 1 MG: 1 TABLET ORAL at 08:53

## 2022-06-13 RX ADMIN — HYDROCORTISONE 2.5%: 25 CREAM TOPICAL at 09:03

## 2022-06-13 RX ADMIN — INSULIN DETEMIR 10 UNITS: 100 INJECTION, SOLUTION SUBCUTANEOUS at 08:53

## 2022-06-13 RX ADMIN — OXYCODONE HYDROCHLORIDE 10 MG: 5 TABLET ORAL at 22:04

## 2022-06-13 RX ADMIN — DICLOFENAC SODIUM 2 G: 10 GEL TOPICAL at 22:00

## 2022-06-13 RX ADMIN — LACTULOSE 30 G: 20 SOLUTION ORAL at 08:51

## 2022-06-13 RX ADMIN — DICLOFENAC SODIUM 2 G: 10 GEL TOPICAL at 18:04

## 2022-06-13 RX ADMIN — QUETIAPINE FUMARATE 50 MG: 25 TABLET ORAL at 22:36

## 2022-06-13 RX ADMIN — FUROSEMIDE 40 MG: 40 TABLET ORAL at 22:00

## 2022-06-13 RX ADMIN — FAMOTIDINE 20 MG: 20 TABLET ORAL at 06:38

## 2022-06-13 RX ADMIN — OXYCODONE HYDROCHLORIDE 10 MG: 5 TABLET ORAL at 09:01

## 2022-06-13 RX ADMIN — MUPIROCIN 1 APPLICATION: 20 OINTMENT TOPICAL at 22:00

## 2022-06-13 RX ADMIN — SPIRONOLACTONE 100 MG: 25 TABLET ORAL at 08:52

## 2022-06-13 RX ADMIN — HYDROCORTISONE 2.5%: 25 CREAM TOPICAL at 22:00

## 2022-06-13 RX ADMIN — BUSPIRONE HYDROCHLORIDE 15 MG: 15 TABLET ORAL at 22:00

## 2022-06-13 RX ADMIN — OXYCODONE HYDROCHLORIDE 10 MG: 5 TABLET ORAL at 18:04

## 2022-06-13 RX ADMIN — POLYETHYLENE GLYCOL 400 AND PROPYLENE GLYCOL 2 DROP: 4; 3 SOLUTION/ DROPS OPHTHALMIC at 09:03

## 2022-06-13 RX ADMIN — BUDESONIDE 0.5 MG: 0.5 SUSPENSION RESPIRATORY (INHALATION) at 20:49

## 2022-06-13 RX ADMIN — MUPIROCIN 1 APPLICATION: 20 OINTMENT TOPICAL at 09:02

## 2022-06-13 RX ADMIN — ATORVASTATIN CALCIUM 40 MG: 40 TABLET, FILM COATED ORAL at 22:00

## 2022-06-13 RX ADMIN — Medication 10 ML: at 22:00

## 2022-06-13 RX ADMIN — INSULIN LISPRO 2 UNITS: 100 INJECTION, SOLUTION INTRAVENOUS; SUBCUTANEOUS at 18:04

## 2022-06-13 RX ADMIN — BUDESONIDE 0.5 MG: 0.5 SUSPENSION RESPIRATORY (INHALATION) at 10:26

## 2022-06-13 RX ADMIN — TRAZODONE HYDROCHLORIDE 50 MG: 50 TABLET ORAL at 22:36

## 2022-06-13 RX ADMIN — OXYCODONE HYDROCHLORIDE 10 MG: 5 TABLET ORAL at 14:21

## 2022-06-13 RX ADMIN — CETIRIZINE HYDROCHLORIDE 10 MG: 10 TABLET, FILM COATED ORAL at 08:53

## 2022-06-13 RX ADMIN — FLUTICASONE PROPIONATE 2 SPRAY: 50 SPRAY, METERED NASAL at 09:02

## 2022-06-13 RX ADMIN — LACTULOSE 30 G: 20 SOLUTION ORAL at 22:00

## 2022-06-13 RX ADMIN — Medication 100 MG: at 08:52

## 2022-06-13 RX ADMIN — INSULIN LISPRO 2 UNITS: 100 INJECTION, SOLUTION INTRAVENOUS; SUBCUTANEOUS at 08:57

## 2022-06-13 RX ADMIN — SERTRALINE HYDROCHLORIDE 50 MG: 50 TABLET ORAL at 08:52

## 2022-06-13 RX ADMIN — BUSPIRONE HYDROCHLORIDE 15 MG: 15 TABLET ORAL at 08:52

## 2022-06-13 RX ADMIN — FAMOTIDINE 20 MG: 20 TABLET ORAL at 16:54

## 2022-06-13 RX ADMIN — FUROSEMIDE 40 MG: 40 TABLET ORAL at 08:52

## 2022-06-13 RX ADMIN — LACTULOSE 30 G: 20 SOLUTION ORAL at 16:54

## 2022-06-13 NOTE — PLAN OF CARE
Goal Outcome Evaluation:              Outcome Evaluation: PT is alert and oriented, having normal bm's, vitals have been stable. Pt does get pain medications Q4 and has been complainnig of charley horses. Pt still has a great amount of edema in his right leg. Will continue to monitor.

## 2022-06-13 NOTE — PROGRESS NOTES
[facility]   Hospitalist Progress Note  Date: 2022  Patient Name: Nic Nicolas  : 1965  MRN: [id number]  Date of admission: 2022      Subjective   Subjective     Chief Complaint: Follow up for altered mental status    Summary: 57-year-old male presented with altered mental status subsequently treated for acute hepatic encephalopathy.  Psychiatry was consulted at 1 point due to suicidal ideation.  Patient had as needed medications added at that time.  Orthopedic was consulted due to a right shoulder x-ray revealing a comminuted nondisplaced fracture of the greater tuberosity of the proximal humerus and minimally displaced fracture of the inferior glenoid.  It was recommended patient continue with sling and have follow-up x-ray in 2 weeks.  Currently patient is awaiting rehab placement.      Interval Followup: No events overnight.  Patient states he was able to sleep.  He is still hyperactive this morning.  His rash from yesterday is improved.  He has no current chest pain or chest pressure.  He did try to ask me about pain medication and I told him we had discussed this and he is agreeable to what he is on at this time.  He has not any fevers or chills.  He is tolerating oral intake without nausea or emesis.  He is awaiting potential placement.    Review of Systems  All systems reviewed and are negative except as above in HPI.    Objective   Objective     Vitals:   Temp:  [97.6 °F (36.4 °C)-98.8 °F (37.1 °C)] 98.1 °F (36.7 °C)  Heart Rate:  [64-88] 81  Resp:  [18] 18  BP: (114-145)/(53-74) 121/58  Physical Exam   Constitutional: Alert, awake, sitting up in bedside chair, appears weak, hyperactive  HEENT:  PERRLA, EOMI; No Scleral icterus  Neck:  Supple; No Mass, No Lymphadenopathy  Cardiovascular:  No Rubs, No Edema, No JVD   Respiratory: No respiratory distress, unlabored breathing, saturating well on room air  Abdomen:  Normal BS all 4 Quadrants; No Guarding, No  Tenderness  Musculoskeletal:  Pulses Positive all 4 Ext; No Cyanosis, No Edema  Neurological: No facial asymmetry, answers questions, intermittently confused, moves all extremities  Psychiatry: No agitation, intermittently hyperactive l  Skin:  No Rash, No Cellulitis, No Erythema    Result Review    Result Review:  I have personally reviewed the results from the time of this admission to 6/13/2022 08:19 EDT and agree with these findings:  [x]  Laboratory  CBC    CBC 6/9/22 6/10/22 6/11/22   WBC 3.41 4.25 3.95   RBC 2.67 (A) 2.97 (A) 2.94 (A)   Hemoglobin 8.8 (A) 9.7 (A) 9.5 (A)   Hematocrit 25.8 (A) 31.2 (A) 28.5 (A)   MCV 96.6 105.1 (A) 96.9   MCH 33.0 32.7 32.3   MCHC 34.1 31.1 (A) 33.3   RDW 16.0 (A) 16.0 (A) 16.0 (A)   Platelets 54 (A) 50 (A) 61 (A)   (A) Abnormal value            BMP    BMP 6/10/22 6/10/22 6/11/22 6/12/22    0506 2208     BUN 17 16 16 22 (A)   Creatinine 0.98 1.06 0.98 0.89   Sodium 132 (A) 134 (A) 133 (A) 131 (A)   Potassium 5.4 (A) 4.5 4.5 4.8   Chloride 103 99 100 97 (A)   CO2 17.6 (A) 25.4 22.6 26.6   Calcium 8.9 8.9 8.7 8.9   (A) Abnormal value       Comments are available for some flowsheets but are not being displayed.           [x]  Microbiology   []  Radiology  []  EKG/Telemetry   []  Cardiology/Vascular   []  Pathology  []  Old records  [x]  Other:     Intake/Output Summary (Last 24 hours) at 6/13/2022 0819  Last data filed at 6/12/2022 1840  Gross per 24 hour   Intake 840 ml   Output --   Net 840 ml         Assessment & Plan   Assessment / Plan     Assessment:  Hyperactive delirium  Minimally comminuted nondisplaced fracture of the greater tuberosity of the proximal humerus.  Minimally displaced fracture at the inferior glenoid.  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  Hepatic encephalopathy  ROMO cirrhosis  Pancytopenia likely related to cirrhosis  Insulin-dependent diabetes mellitus - blood sugars controlled  Obstructive sleep apnea not on home CPAP  History of traumatic brain  injury  Mild hyperbilirubinemia  Major depression with suicidal ideation  Acute Respiratory Alkalosis , resolved  Thrombocytopenia   Dyslipidemia  Acute kidney injury secondary to prerenal etiology, resolved  Folliculitis      Plan:   Continue with Atarax for pruritus  Continue with mupirocin cream on patient's rash  Continue with delirium precautions  Continue Seroquel, Haldol as needed for agitation  Continue with trazodone as needed at night for insomnia  Continue with lactulose and rifaximin, patient needs at least 2-3 bowel movements daily, can hold once he has 2 bowel movements  Psychiatry recommendations appreciated, continue BuSpar 15 mg twice daily along with Zoloft 50 mg daily, currently seeing as needed  Continue thiamine folic acid daily  Continue with Levemir along with correctional insulin, glucose remains at goal  Continue with furosemide twice daily and spironolactone daily  Case management assistance appreciated, patient will guide placement as his family is unable to care for him, appreciate assistance  Continue trazodone as needed at night  Continue with eyedrops  Continue with Anusol  Labs reviewed, image reviewed, medications reviewed, vital signs reviewed  Further recommendations pending clinical course    Discussed with RN.    DVT prophylaxis:  Mechanical DVT prophylaxis orders are present.    CODE STATUS:   Code Status (Patient has no pulse and is not breathing): CPR (Attempt to Resuscitate)  Medical Interventions (Patient has pulse or is breathing): Full Support    Electronically signed by Denzel Bolanos DO, 06/13/22, 8:19 AM EDT.

## 2022-06-13 NOTE — PAYOR COMM NOTE
"Parish Wright (57 y.o. Male)             Date of Birth   1965    Social Security Number       Address   [address] [place] [address]    Home Phone   [phone]    MRN   [id number]       Roman Catholic   Orthodox    Marital Status                               Admission Date   5/24/22    Admission Type   Emergency    Admitting Provider   Denzel Bolanos DO    Attending Provider   Denzel Bolanos DO    Department, Room/Bed   [address], 4001/1       Discharge Date       Discharge Disposition       Discharge Destination                               Attending Provider: Denzel Bolanos DO    Allergies: No Known Allergies    Isolation: None   Infection: None   Code Status: CPR   Advance Care Planning Activity    Ht: 182.9 cm (72\")   Wt: 106 kg (233 lb 11 oz)    Admission Cmt: None   Principal Problem: None                Active Insurance as of 5/24/2022     Primary Coverage     Payor Plan Insurance Group Employer/Plan Group    [facility] [id number]     Payor Plan Address Payor Plan Phone Number Payor Plan Fax Number Effective Dates    [address]   5/1/2022 - None Entered    [place] [address]       Subscriber Name Subscriber Birth Date Member ID       PARISH WRIGHT 1965 [id number]                 Emergency Contacts      (Rel.) Home Phone Work Phone Mobile Phone    MURPHY,DG (Sister) -- -- [phone]    Christo Soria (Friend) [phone] -- --    Nandini Wright (Daughter) -- -- [phone]        #[id number]     CONTACT   LONG S UTILIZATION REVIEW    [facility]  [address] [place] [address]  TAX ID [id number]  NPI  [id number]    PH   [phone]   [phone]    CONTACT   LONG S UTILIZATION REVIEW    [facility]  [address] [place] [address]  TAX ID [id number]  NPI  [id number]    PH   [phone]   [phone]            +++no " notes yet for today++++    Physician Progress Notes (last 24 hours)  Notes from 22 143 through 22   No notes of this type exist for this encounter.         Denzel Bolanos DO    Physician   Hospitalist   Progress Notes       Signed   Date of Service:  22   Creation Time:  22              Signed        Expand All Collapse All        Show:Clear all  [x]Manual[x]Template[x]Copied    Added by:  [x]Denzel Bolanos DO      []Hans for details     [place] Hospitalist Progress Note  Date: 2022  Patient Name: Nic Nicolas  : 1965  MRN: [id number]  Date of admission: 2022           Subjective []Expand by Default     Subjective      Chief Complaint: Follow up for altered mental status     Interval Followup: No events overnight.  Patient still remains hyperactive.  He did develop a rash today per nursing.  Upon evaluation patient states that he usually puts a cream on this rash and it resolves.  I told him appears to be folliculitis and he was encouraged to exfoliate while sharing.  Additionally told patient we will add cream and medication for itching.  He denies any fevers or chills.  He is tolerating oral intake without nausea or emesis.  He denies any chest pain, chest pressure, palpitations.        Review of Systems  All systems reviewed and are negative except as above in HPI.           Objective      Objective      Vitals:   Temp:  [97.3 °F (36.3 °C)-98.8 °F (37.1 °C)] 97.9 °F (36.6 °C)  Heart Rate:  [77-86] 86  Resp:  [18] 18  BP: (114-144)/(53-69) 125/59  Physical Exam             Constitutional: Alert, awake, appears weak  HEENT:  PERRLA, EOMI; No Scleral icterus  Neck:  Supple; No Mass, No Lymphadenopathy  Cardiovascular:  No Rubs, No Edema, No JVD   Respiratory: No respiratory distress, unlabored breathing, saturating well on room air  Abdomen:  Normal BS all 4 Quadrants; No Guarding, No Tenderness  Musculoskeletal:  Pulses Positive all 4 Ext;  No Cyanosis, No Edema  Neurological: No facial asymmetry, answers questions, intermittently confused, moves all extremities  Psychiatry: No agitation, intermittently hyperactive l  Skin:  No Rash, No Cellulitis, No Erythema           Result Review       Result Review:  I have personally reviewed the results from the time of this admission to 6/12/2022 18:12 EDT and agree with these findings:  [x]?  Laboratory  CBC Results   CBC    CBC 6/9/22 6/10/22 6/11/22   WBC 3.41 4.25 3.95   RBC 2.67 (A) 2.97 (A) 2.94 (A)   Hemoglobin 8.8 (A) 9.7 (A) 9.5 (A)   Hematocrit 25.8 (A) 31.2 (A) 28.5 (A)   MCV 96.6 105.1 (A) 96.9   MCH 33.0 32.7 32.3   MCHC 34.1 31.1 (A) 33.3   RDW 16.0 (A) 16.0 (A) 16.0 (A)   Platelets 54 (A) 50 (A) 61 (A)   (A) Abnormal value                  Basic Metabolic Profile Reults:          BMP    BMP 6/10/22 6/10/22 6/11/22 6/12/22     0506 2208       BUN 17 16 16 22 (A)   Creatinine 0.98 1.06 0.98 0.89   Sodium 132 (A) 134 (A) 133 (A) 131 (A)   Potassium 5.4 (A) 4.5 4.5 4.8   Chloride 103 99 100 97 (A)   CO2 17.6 (A) 25.4 22.6 26.6   Calcium 8.9 8.9 8.7 8.9   (A) Abnormal value        Comments are available for some flowsheets but are not being displayed.                 [x]?  Microbiology   []?  Radiology  []?  EKG/Telemetry   []?  Cardiology/Vascular   []?  Pathology  []?  Old records  [x]?  Other:      Intake/Output Summary (Last 24 hours) at 6/12/2022 1812  Last data filed at 6/12/2022 1300      Gross per 24 hour   Intake 480 ml   Output --   Net 480 ml                  Assessment & Plan     Assessment / Plan      Assessment:  Hyperactive delirium  Minimally comminuted nondisplaced fracture of the greater tuberosity of the proximal humerus.  Minimally displaced fracture at the inferior glenoid.  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  Hepatic encephalopathy  ROMO cirrhosis  Pancytopenia likely related to cirrhosis  Insulin-dependent diabetes mellitus - blood sugars controlled  Obstructive sleep apnea  not on home CPAP  History of traumatic brain injury  Mild hyperbilirubinemia  Major depression with suicidal ideation  Acute Respiratory Alkalosis , resolved  Thrombocytopenia   Dyslipidemia  Acute kidney injury secondary to prerenal etiology, resolved  Folliculitis        Plan:   · Will give Atarax for rash, mupirocin cream, continue to monitor  · Continue with delirium precautions  · Continue Seroquel, Haldol as needed for agitation  · Continue with trazodone as needed at night for insomnia  · Continue with lactulose and rifaximin, patient needs at least 2-3 bowel movements daily, can hold once he has 2 bowel movements  · Psychiatry recommendations appreciated, continue BuSpar 15 mg twice daily along with Zoloft 50 mg daily, currently seeing as needed  · We will see if we can get psych to reevaluate tomorrow given his persistent yana  · Continue thiamine folic acid daily  · Continue with Levemir along with correctional insulin  · Platelet count remains stable  · Patient continues to diurese well, renal function stable, continue with furosemide twice daily and spironolactone daily  · Case management assistance appreciated, patient will guide placement as his family is unable to care for him, appreciate assistance  · Continue trazodone as needed at night  · Continue with eyedrops  · Continue with Anusol  · Labs reviewed, image reviewed, medications reviewed, vital signs reviewed  · Further recommendations pending clinical course     Discussed with RN.     DVT prophylaxis:  Mechanical DVT prophylaxis orders are present.     CODE STATUS:   Code Status (Patient has no pulse and is not breathing): CPR (Attempt to Resuscitate)  Medical Interventions (Patient has pulse or is breathing): Full Support     Electronically signed by Denzel Bolanos DO, 06/12/22, 6:13 PM EDT.                 Lillian Hooker, RN    Registered Nurse   Nursing   Plan of Care       Signed   Date of Service:  06/12/22 0144   Creation Time:  06/12/22  0144              Signed              Show:Clear all  [x]Manual[x]Template[]Copied    Added by:  [x]Lillian Hooker, RN      []Hans for details    Goal Outcome Evaluation:  Plan of Care Reviewed With: patient  Progress: no change Pt is awake most of the night. Pt c/o pain calves. Muscle in calves is tight . Pt c/o discomfort regarding. Giving pain medication see Emar,  Pt is stating having numbness, tingling and burning in lower legs and feet. Informed MD. Will continue to monitor pt. Receiving Lasix po scheduled. Potassium level WNL. No potassium supplement noted. Pt stated was going to lay down and try to rest.                     Danuta Smith, RN    Registered Nurse   Nursing   Plan of Care       Signed   Date of Service:  06/12/22 1926   Creation Time:  06/12/22 1926              Signed              Show:Clear all  [x]Manual[x]Template[]Copied    Added by:  [x]Danuta Smith, RN      []Hans for details    Goal Outcome Evaluation:  Plan of Care Reviewed With: patient   Pt has been anxious today talks fast, rambles on, gets fixated on things, compulsive, asked for 3 towels, 4 wash clothes, 3 soaps, for shower, regularly ask for pain meds, keeps track of time due, easily upset if not given when asked for, did sleep about 1 hour undisturbed, developed rash on abd and upper thighs, MD aware, PRN cream and Atarax ordered. Wants Pepsi and Sprite, educated him on need for Diet soda due to his DM, tried to set boundaries, gets upset easily when doesn't get what he wants.but is easy to redirect

## 2022-06-14 LAB
ALBUMIN SERPL-MCNC: 3.3 G/DL (ref 3.5–5.2)
ALBUMIN/GLOB SERPL: 1.1 G/DL
ALP SERPL-CCNC: 118 U/L (ref 39–117)
ALT SERPL W P-5'-P-CCNC: 29 U/L (ref 1–41)
ANION GAP SERPL CALCULATED.3IONS-SCNC: 7.5 MMOL/L (ref 5–15)
AST SERPL-CCNC: 50 U/L (ref 1–40)
BASOPHILS # BLD AUTO: 0.02 10*3/MM3 (ref 0–0.2)
BASOPHILS NFR BLD AUTO: 0.6 % (ref 0–1.5)
BILIRUB SERPL-MCNC: 0.9 MG/DL (ref 0–1.2)
BUN SERPL-MCNC: 26 MG/DL (ref 6–20)
BUN/CREAT SERPL: 25.7 (ref 7–25)
CALCIUM SPEC-SCNC: 9 MG/DL (ref 8.6–10.5)
CHLORIDE SERPL-SCNC: 98 MMOL/L (ref 98–107)
CO2 SERPL-SCNC: 25.5 MMOL/L (ref 22–29)
CREAT SERPL-MCNC: 1.01 MG/DL (ref 0.76–1.27)
DEPRECATED RDW RBC AUTO: 55.1 FL (ref 37–54)
EGFRCR SERPLBLD CKD-EPI 2021: 87.3 ML/MIN/1.73
EOSINOPHIL # BLD AUTO: 0.15 10*3/MM3 (ref 0–0.4)
EOSINOPHIL NFR BLD AUTO: 4.3 % (ref 0.3–6.2)
ERYTHROCYTE [DISTWIDTH] IN BLOOD BY AUTOMATED COUNT: 15.5 % (ref 12.3–15.4)
GLOBULIN UR ELPH-MCNC: 3.1 GM/DL
GLUCOSE BLDC GLUCOMTR-MCNC: 163 MG/DL (ref 70–99)
GLUCOSE BLDC GLUCOMTR-MCNC: 185 MG/DL (ref 70–99)
GLUCOSE BLDC GLUCOMTR-MCNC: 198 MG/DL (ref 70–99)
GLUCOSE SERPL-MCNC: 221 MG/DL (ref 65–99)
HCT VFR BLD AUTO: 28.1 % (ref 37.5–51)
HGB BLD-MCNC: 9.4 G/DL (ref 13–17.7)
IMM GRANULOCYTES # BLD AUTO: 0.01 10*3/MM3 (ref 0–0.05)
IMM GRANULOCYTES NFR BLD AUTO: 0.3 % (ref 0–0.5)
LYMPHOCYTES # BLD AUTO: 0.73 10*3/MM3 (ref 0.7–3.1)
LYMPHOCYTES NFR BLD AUTO: 20.9 % (ref 19.6–45.3)
MAGNESIUM SERPL-MCNC: 1.7 MG/DL (ref 1.6–2.6)
MCH RBC QN AUTO: 32.8 PG (ref 26.6–33)
MCHC RBC AUTO-ENTMCNC: 33.5 G/DL (ref 31.5–35.7)
MCV RBC AUTO: 97.9 FL (ref 79–97)
MONOCYTES # BLD AUTO: 0.43 10*3/MM3 (ref 0.1–0.9)
MONOCYTES NFR BLD AUTO: 12.3 % (ref 5–12)
NEUTROPHILS NFR BLD AUTO: 2.15 10*3/MM3 (ref 1.7–7)
NEUTROPHILS NFR BLD AUTO: 61.6 % (ref 42.7–76)
NRBC BLD AUTO-RTO: 0 /100 WBC (ref 0–0.2)
PHOSPHATE SERPL-MCNC: 3.5 MG/DL (ref 2.5–4.5)
PLATELET # BLD AUTO: 57 10*3/MM3 (ref 140–450)
PMV BLD AUTO: 11.2 FL (ref 6–12)
POTASSIUM SERPL-SCNC: 4.4 MMOL/L (ref 3.5–5.2)
PROT SERPL-MCNC: 6.4 G/DL (ref 6–8.5)
RBC # BLD AUTO: 2.87 10*6/MM3 (ref 4.14–5.8)
SODIUM SERPL-SCNC: 131 MMOL/L (ref 136–145)
WBC NRBC COR # BLD: 3.49 10*3/MM3 (ref 3.4–10.8)

## 2022-06-14 PROCEDURE — 83735 ASSAY OF MAGNESIUM: CPT | Performed by: STUDENT IN AN ORGANIZED HEALTH CARE EDUCATION/TRAINING PROGRAM

## 2022-06-14 PROCEDURE — 63710000001 INSULIN LISPRO (HUMAN) PER 5 UNITS: Performed by: FAMILY MEDICINE

## 2022-06-14 PROCEDURE — 63710000001 INSULIN DETEMIR PER 5 UNITS: Performed by: FAMILY MEDICINE

## 2022-06-14 PROCEDURE — 80053 COMPREHEN METABOLIC PANEL: CPT | Performed by: STUDENT IN AN ORGANIZED HEALTH CARE EDUCATION/TRAINING PROGRAM

## 2022-06-14 PROCEDURE — 94799 UNLISTED PULMONARY SVC/PX: CPT

## 2022-06-14 PROCEDURE — 99231 SBSQ HOSP IP/OBS SF/LOW 25: CPT | Performed by: INTERNAL MEDICINE

## 2022-06-14 PROCEDURE — 85025 COMPLETE CBC W/AUTO DIFF WBC: CPT | Performed by: STUDENT IN AN ORGANIZED HEALTH CARE EDUCATION/TRAINING PROGRAM

## 2022-06-14 PROCEDURE — 84100 ASSAY OF PHOSPHORUS: CPT | Performed by: STUDENT IN AN ORGANIZED HEALTH CARE EDUCATION/TRAINING PROGRAM

## 2022-06-14 PROCEDURE — 82962 GLUCOSE BLOOD TEST: CPT

## 2022-06-14 RX ADMIN — LACTULOSE 30 G: 20 SOLUTION ORAL at 09:47

## 2022-06-14 RX ADMIN — LACTULOSE 30 G: 20 SOLUTION ORAL at 20:16

## 2022-06-14 RX ADMIN — INSULIN LISPRO 2 UNITS: 100 INJECTION, SOLUTION INTRAVENOUS; SUBCUTANEOUS at 17:34

## 2022-06-14 RX ADMIN — INSULIN LISPRO 2 UNITS: 100 INJECTION, SOLUTION INTRAVENOUS; SUBCUTANEOUS at 09:47

## 2022-06-14 RX ADMIN — INSULIN LISPRO 2 UNITS: 100 INJECTION, SOLUTION INTRAVENOUS; SUBCUTANEOUS at 13:21

## 2022-06-14 RX ADMIN — OXYCODONE HYDROCHLORIDE 10 MG: 5 TABLET ORAL at 06:33

## 2022-06-14 RX ADMIN — DICLOFENAC SODIUM 2 G: 10 GEL TOPICAL at 20:19

## 2022-06-14 RX ADMIN — Medication 10 ML: at 20:17

## 2022-06-14 RX ADMIN — INSULIN DETEMIR 10 UNITS: 100 INJECTION, SOLUTION SUBCUTANEOUS at 09:54

## 2022-06-14 RX ADMIN — OXYCODONE HYDROCHLORIDE 10 MG: 5 TABLET ORAL at 20:16

## 2022-06-14 RX ADMIN — LACTULOSE 30 G: 20 SOLUTION ORAL at 17:33

## 2022-06-14 RX ADMIN — Medication 100 MG: at 09:48

## 2022-06-14 RX ADMIN — HYDROCORTISONE 2.5%: 25 CREAM TOPICAL at 09:50

## 2022-06-14 RX ADMIN — OXYCODONE HYDROCHLORIDE 10 MG: 5 TABLET ORAL at 15:39

## 2022-06-14 RX ADMIN — FAMOTIDINE 20 MG: 20 TABLET ORAL at 06:33

## 2022-06-14 RX ADMIN — FAMOTIDINE 20 MG: 20 TABLET ORAL at 17:34

## 2022-06-14 RX ADMIN — Medication 10 ML: at 09:47

## 2022-06-14 RX ADMIN — BUDESONIDE 0.5 MG: 0.5 SUSPENSION RESPIRATORY (INHALATION) at 20:30

## 2022-06-14 RX ADMIN — POLYETHYLENE GLYCOL 400 AND PROPYLENE GLYCOL 2 DROP: 4; 3 SOLUTION/ DROPS OPHTHALMIC at 09:50

## 2022-06-14 RX ADMIN — OXYCODONE HYDROCHLORIDE 10 MG: 5 TABLET ORAL at 02:31

## 2022-06-14 RX ADMIN — OXYCODONE HYDROCHLORIDE 10 MG: 5 TABLET ORAL at 11:05

## 2022-06-14 RX ADMIN — CETIRIZINE HYDROCHLORIDE 10 MG: 10 TABLET, FILM COATED ORAL at 09:48

## 2022-06-14 RX ADMIN — SERTRALINE HYDROCHLORIDE 50 MG: 50 TABLET ORAL at 09:48

## 2022-06-14 RX ADMIN — SPIRONOLACTONE 100 MG: 25 TABLET ORAL at 09:48

## 2022-06-14 RX ADMIN — DICLOFENAC SODIUM 2 G: 10 GEL TOPICAL at 09:58

## 2022-06-14 RX ADMIN — DICLOFENAC SODIUM 2 G: 10 GEL TOPICAL at 13:21

## 2022-06-14 RX ADMIN — BUSPIRONE HYDROCHLORIDE 15 MG: 15 TABLET ORAL at 20:16

## 2022-06-14 RX ADMIN — MUPIROCIN 1 APPLICATION: 20 OINTMENT TOPICAL at 09:57

## 2022-06-14 RX ADMIN — FLUTICASONE PROPIONATE 2 SPRAY: 50 SPRAY, METERED NASAL at 09:50

## 2022-06-14 RX ADMIN — BUDESONIDE 0.5 MG: 0.5 SUSPENSION RESPIRATORY (INHALATION) at 08:09

## 2022-06-14 RX ADMIN — FUROSEMIDE 40 MG: 40 TABLET ORAL at 20:16

## 2022-06-14 RX ADMIN — DICLOFENAC SODIUM 2 G: 10 GEL TOPICAL at 17:34

## 2022-06-14 RX ADMIN — FUROSEMIDE 40 MG: 40 TABLET ORAL at 09:48

## 2022-06-14 RX ADMIN — TRAZODONE HYDROCHLORIDE 50 MG: 50 TABLET ORAL at 20:16

## 2022-06-14 RX ADMIN — FOLIC ACID 1 MG: 1 TABLET ORAL at 09:48

## 2022-06-14 RX ADMIN — ATORVASTATIN CALCIUM 40 MG: 40 TABLET, FILM COATED ORAL at 20:17

## 2022-06-14 RX ADMIN — BUSPIRONE HYDROCHLORIDE 15 MG: 15 TABLET ORAL at 09:49

## 2022-06-14 NOTE — PLAN OF CARE
Goal Outcome Evaluation:  Plan of Care Reviewed With: patient        Progress: no change  Outcome Evaluation: vital signs have been stable throughout shift. pt alert and oriented x 4. pt up ad myrtle in room today. blood glucose monitored this shift. pt medicated for pain per orders today. wound/skin care performed per orders today. no other complaints at this time.

## 2022-06-14 NOTE — PLAN OF CARE
Goal Outcome Evaluation:  Plan of Care Reviewed With: patient        Progress: no change  Outcome Evaluation: A&OX4. VS WNL. NO SIGNIFICANT CHANGE AT THIS TIME

## 2022-06-14 NOTE — PAYOR COMM NOTE
"Parish Wright (57 y.o. Male)             Date of Birth   1965    Social Security Number       Address   [address] [place] [address]    Home Phone   [phone]    MRN   [id number]       Rastafari   Holiness    Marital Status                               Admission Date   22    Admission Type   Emergency    Admitting Provider   Odell Soliz MD    Attending Provider   Odell Soliz MD    Department, Room/Bed   [address], 4001/1       Discharge Date       Discharge Disposition       Discharge Destination                               Attending Provider: Odell Soliz MD    Allergies: No Known Allergies    Isolation: None   Infection: None   Code Status: CPR   Advance Care Planning Activity    Ht: 182.9 cm (72\")   Wt: 106 kg (233 lb 11 oz)    Admission Cmt: None   Principal Problem: None                Active Insurance as of 2022     Primary Coverage     Payor Plan Insurance Group Employer/Plan Group    [facility] [id number]     Payor Plan Address Payor Plan Phone Number Payor Plan Fax Number Effective Dates    [address]   2022 - None Entered    [place] [address]       Subscriber Name Subscriber Birth Date Member ID       PARISH WRIGHT 1965 [id number]                 Emergency Contacts      (Rel.) Home Phone Work Phone Mobile Phone    DG MURPHY (Sister) -- -- [phone]    Christo Soria (Friend) [phone] -- --    Nandini Wright (Daughter) -- -- [phone]        #[id number]     CONTACT   LONG SORTO UTILIZATION REVIEW    [facility]  [address] [place] [address]  TAX ID [id number]  NPI  [id number]       [phone]   [phone]       Physician Progress Notes (last 24 hours)      Odell Soliz MD at 22 1319           [facility]   Hospitalist Progress Note  Date: 2022  Patient Name: Parish Wright  : " 1965  MRN: [id number]  Date of admission: 5/24/2022      Subjective   Subjective     Chief Complaint: Follow up for altered mental status    Summary: 57-year-old male presented with altered mental status subsequently treated for acute hepatic encephalopathy.  Psychiatry was consulted at 1 point due to suicidal ideation.  Patient had as needed medications added at that time.  Orthopedic was consulted due to a right shoulder x-ray revealing a comminuted nondisplaced fracture of the greater tuberosity of the proximal humerus and minimally displaced fracture of the inferior glenoid.  It was recommended patient continue with sling and have follow-up x-ray in 2 weeks.  Currently patient is awaiting rehab placement.      Interval Followup:   Patient sitting in bedside chair today, no acute complaints no issues overnight.  Still working towards appropriate disposition.  Patient still Medicaid pending    Review of Systems  All systems reviewed and are negative except as above in HPI.    Objective   Objective     Vitals:   Temp:  [97.2 °F (36.2 °C)-98.1 °F (36.7 °C)] 97.2 °F (36.2 °C)  Heart Rate:  [66-98] 75  Resp:  [12-20] 12  BP: (109-142)/(57-65) 133/60  Physical Exam   Constitutional: Alert, awake, sitting up in bedside chair, appears weak, hyperactive  HEENT:  PERRLA, EOMI; No Scleral icterus  Neck:  Supple; No Mass, No Lymphadenopathy  Cardiovascular:  No Rubs, No Edema, No JVD   Respiratory: No respiratory distress, unlabored breathing, saturating well on room air  Abdomen:  Normal BS all 4 Quadrants; No Guarding, No Tenderness  Musculoskeletal:  Pulses Positive all 4 Ext; No Cyanosis, No Edema  Neurological: No facial asymmetry, answers questions, intermittently confused, moves all extremities  Psychiatry: No agitation, intermittently hyperactive l  Skin: Lateral lower extremities with dressing    Result Review    Result Review:  I have personally reviewed the results from the time of this admission to 6/14/2022  13:19 EDT and agree with these findings:  [x]  Laboratory  [x]  Microbiology   []  Radiology  []  EKG/Telemetry   []  Cardiology/Vascular   []  Pathology  [x]  Old records  [x]  Other:     Intake/Output Summary (Last 24 hours) at 6/14/2022 1319  Last data filed at 6/14/2022 0900  Gross per 24 hour   Intake 600 ml   Output --   Net 600 ml         Assessment & Plan   Assessment / Plan     Assessment:  Hyperactive delirium  Minimally comminuted nondisplaced fracture of the greater tuberosity of the proximal humerus.  Minimally displaced fracture at the inferior glenoid.  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  Hepatic encephalopathy  ROMO cirrhosis  Pancytopenia likely related to cirrhosis  Insulin-dependent diabetes mellitus - blood sugars controlled  Obstructive sleep apnea not on home CPAP  History of traumatic brain injury  Mild hyperbilirubinemia  Major depression with suicidal ideation  Acute Respiratory Alkalosis , resolved  Thrombocytopenia   Dyslipidemia  Acute kidney injury secondary to prerenal etiology, resolved  Folliculitis      Plan:   · Continue with Atarax for pruritus  · Continue with mupirocin cream on patient's rash  · Continue with delirium precautions  · Continue Seroquel, Haldol as needed for agitation  · Continue with trazodone as needed at night for insomnia  · Continue with lactulose and rifaximin, patient needs at least 2-3 bowel movements daily, can hold once he has 2 bowel movements  · Psychiatry recommendations appreciated, continue BuSpar 15 mg twice daily along with Zoloft 50 mg daily, currently seeing as needed  · Continue thiamine folic acid daily  · Continue with Levemir along with correctional insulin, glucose remains at goal  · Continue with furosemide twice daily and spironolactone daily  · Case management assistance appreciated, patient will guide placement as his family is unable to care for him, appreciate assistance  · Continue trazodone as needed at night  · Continue with  eyedrops  · Continue with Anusol  · Labs reviewed, image reviewed, medications reviewed, vital signs reviewed  · Further recommendations pending clinical course    Discussed with RN.     DVT prophylaxis:  Mechanical DVT prophylaxis orders are present.    CODE STATUS:   Code Status (Patient has no pulse and is not breathing): CPR (Attempt to Resuscitate)  Medical Interventions (Patient has pulse or is breathing): Full Support    CBC    CBC 6/10/22 6/11/22 6/14/22   WBC 4.25 3.95 3.49   RBC 2.97 (A) 2.94 (A) 2.87 (A)   Hemoglobin 9.7 (A) 9.5 (A) 9.4 (A)   Hematocrit 31.2 (A) 28.5 (A) 28.1 (A)   .1 (A) 96.9 97.9 (A)   MCH 32.7 32.3 32.8   MCHC 31.1 (A) 33.3 33.5   RDW 16.0 (A) 16.0 (A) 15.5 (A)   Platelets 50 (A) 61 (A) 57 (A)   (A) Abnormal value              CMP    CMP 6/11/22 6/12/22 6/14/22   Glucose 231 (A) 146 (A) 221 (A)   BUN 16 22 (A) 26 (A)   Creatinine 0.98 0.89 1.01   Sodium 133 (A) 131 (A) 131 (A)   Potassium 4.5 4.8 4.4   Chloride 100 97 (A) 98   Calcium 8.7 8.9 9.0   Albumin 3.10 (A) 3.30 (A) 3.30 (A)   Total Bilirubin 0.9 1.1 0.9   Alkaline Phosphatase 131 (A) 113 118 (A)   AST (SGOT) 48 (A) 48 (A) 50 (A)   ALT (SGPT) 32 29 29   (A) Abnormal value                  Electronically signed by Odell Soliz MD at 06/14/22 3817

## 2022-06-14 NOTE — PROGRESS NOTES
[facility]   Hospitalist Progress Note  Date: 2022  Patient Name: Nic Nicolas  : 1965  MRN: [id number]  Date of admission: 2022      Subjective   Subjective     Chief Complaint: Follow up for altered mental status    Summary: 57-year-old male presented with altered mental status subsequently treated for acute hepatic encephalopathy.  Psychiatry was consulted at 1 point due to suicidal ideation.  Patient had as needed medications added at that time.  Orthopedic was consulted due to a right shoulder x-ray revealing a comminuted nondisplaced fracture of the greater tuberosity of the proximal humerus and minimally displaced fracture of the inferior glenoid.  It was recommended patient continue with sling and have follow-up x-ray in 2 weeks.  Currently patient is awaiting rehab placement.      Interval Followup:   Patient sitting in bedside chair today, no acute complaints no issues overnight.  Still working towards appropriate disposition.  Patient still Medicaid pending    Review of Systems  All systems reviewed and are negative except as above in HPI.    Objective   Objective     Vitals:   Temp:  [97.2 °F (36.2 °C)-98.1 °F (36.7 °C)] 97.2 °F (36.2 °C)  Heart Rate:  [66-98] 75  Resp:  [12-20] 12  BP: (109-142)/(57-65) 133/60  Physical Exam   Constitutional: Alert, awake, sitting up in bedside chair, appears weak, hyperactive  HEENT:  PERRLA, EOMI; No Scleral icterus  Neck:  Supple; No Mass, No Lymphadenopathy  Cardiovascular:  No Rubs, No Edema, No JVD   Respiratory: No respiratory distress, unlabored breathing, saturating well on room air  Abdomen:  Normal BS all 4 Quadrants; No Guarding, No Tenderness  Musculoskeletal:  Pulses Positive all 4 Ext; No Cyanosis, No Edema  Neurological: No facial asymmetry, answers questions, intermittently confused, moves all extremities  Psychiatry: No agitation, intermittently hyperactive l  Skin: Lateral lower extremities with dressing    Result Review     Result Review:  I have personally reviewed the results from the time of this admission to 6/14/2022 13:19 EDT and agree with these findings:  [x]  Laboratory  [x]  Microbiology   []  Radiology  []  EKG/Telemetry   []  Cardiology/Vascular   []  Pathology  [x]  Old records  [x]  Other:     Intake/Output Summary (Last 24 hours) at 6/14/2022 1319  Last data filed at 6/14/2022 0900  Gross per 24 hour   Intake 600 ml   Output --   Net 600 ml         Assessment & Plan   Assessment / Plan     Assessment:  Hyperactive delirium  Minimally comminuted nondisplaced fracture of the greater tuberosity of the proximal humerus.  Minimally displaced fracture at the inferior glenoid.  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  Hepatic encephalopathy  ROMO cirrhosis  Pancytopenia likely related to cirrhosis  Insulin-dependent diabetes mellitus - blood sugars controlled  Obstructive sleep apnea not on home CPAP  History of traumatic brain injury  Mild hyperbilirubinemia  Major depression with suicidal ideation  Acute Respiratory Alkalosis , resolved  Thrombocytopenia   Dyslipidemia  Acute kidney injury secondary to prerenal etiology, resolved  Folliculitis      Plan:   · Continue with Atarax for pruritus  · Continue with mupirocin cream on patient's rash  · Continue with delirium precautions  · Continue Seroquel, Haldol as needed for agitation  · Continue with trazodone as needed at night for insomnia  · Continue with lactulose and rifaximin, patient needs at least 2-3 bowel movements daily, can hold once he has 2 bowel movements  · Psychiatry recommendations appreciated, continue BuSpar 15 mg twice daily along with Zoloft 50 mg daily, currently seeing as needed  · Continue thiamine folic acid daily  · Continue with Levemir along with correctional insulin, glucose remains at goal  · Continue with furosemide twice daily and spironolactone daily  · Case management assistance appreciated, patient will guide placement as his family is unable  to care for him, appreciate assistance  · Continue trazodone as needed at night  · Continue with eyedrops  · Continue with Anusol  · Labs reviewed, image reviewed, medications reviewed, vital signs reviewed  · Further recommendations pending clinical course    Discussed with RN.     DVT prophylaxis:  Mechanical DVT prophylaxis orders are present.    CODE STATUS:   Code Status (Patient has no pulse and is not breathing): CPR (Attempt to Resuscitate)  Medical Interventions (Patient has pulse or is breathing): Full Support    CBC    CBC 6/10/22 6/11/22 6/14/22   WBC 4.25 3.95 3.49   RBC 2.97 (A) 2.94 (A) 2.87 (A)   Hemoglobin 9.7 (A) 9.5 (A) 9.4 (A)   Hematocrit 31.2 (A) 28.5 (A) 28.1 (A)   .1 (A) 96.9 97.9 (A)   MCH 32.7 32.3 32.8   MCHC 31.1 (A) 33.3 33.5   RDW 16.0 (A) 16.0 (A) 15.5 (A)   Platelets 50 (A) 61 (A) 57 (A)   (A) Abnormal value              CMP    CMP 6/11/22 6/12/22 6/14/22   Glucose 231 (A) 146 (A) 221 (A)   BUN 16 22 (A) 26 (A)   Creatinine 0.98 0.89 1.01   Sodium 133 (A) 131 (A) 131 (A)   Potassium 4.5 4.8 4.4   Chloride 100 97 (A) 98   Calcium 8.7 8.9 9.0   Albumin 3.10 (A) 3.30 (A) 3.30 (A)   Total Bilirubin 0.9 1.1 0.9   Alkaline Phosphatase 131 (A) 113 118 (A)   AST (SGOT) 48 (A) 48 (A) 50 (A)   ALT (SGPT) 32 29 29   (A) Abnormal value

## 2022-06-14 NOTE — PLAN OF CARE
Nutrition Note    Patient followed by RD for LOS. Patient continues to have good intake, typically % of meals, tolerating therapeutic/modified texture diet well. No acute nutrition concerns or nutrition intervention at this time. RD will continue to follow and monitor per protocol.    Gloria Menjivar RD  10:34 EDT
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
## 2022-06-15 LAB
ALBUMIN SERPL-MCNC: 3.2 G/DL (ref 3.5–5.2)
ALBUMIN/GLOB SERPL: 1 G/DL
ALP SERPL-CCNC: 111 U/L (ref 39–117)
ALT SERPL W P-5'-P-CCNC: 30 U/L (ref 1–41)
ANION GAP SERPL CALCULATED.3IONS-SCNC: 10.4 MMOL/L (ref 5–15)
AST SERPL-CCNC: 55 U/L (ref 1–40)
BILIRUB SERPL-MCNC: 1.1 MG/DL (ref 0–1.2)
BUN SERPL-MCNC: 21 MG/DL (ref 6–20)
BUN/CREAT SERPL: 22.6 (ref 7–25)
CALCIUM SPEC-SCNC: 9 MG/DL (ref 8.6–10.5)
CHLORIDE SERPL-SCNC: 98 MMOL/L (ref 98–107)
CO2 SERPL-SCNC: 22.6 MMOL/L (ref 22–29)
CREAT SERPL-MCNC: 0.93 MG/DL (ref 0.76–1.27)
DEPRECATED RDW RBC AUTO: 58.3 FL (ref 37–54)
EGFRCR SERPLBLD CKD-EPI 2021: 96.4 ML/MIN/1.73
ERYTHROCYTE [DISTWIDTH] IN BLOOD BY AUTOMATED COUNT: 15.5 % (ref 12.3–15.4)
GLOBULIN UR ELPH-MCNC: 3.3 GM/DL
GLUCOSE BLDC GLUCOMTR-MCNC: 164 MG/DL (ref 70–99)
GLUCOSE BLDC GLUCOMTR-MCNC: 258 MG/DL (ref 70–99)
GLUCOSE BLDC GLUCOMTR-MCNC: 95 MG/DL (ref 70–99)
GLUCOSE SERPL-MCNC: 138 MG/DL (ref 65–99)
HCT VFR BLD AUTO: 31.4 % (ref 37.5–51)
HGB BLD-MCNC: 9.7 G/DL (ref 13–17.7)
MAGNESIUM SERPL-MCNC: 1.7 MG/DL (ref 1.6–2.6)
MCH RBC QN AUTO: 31.5 PG (ref 26.6–33)
MCHC RBC AUTO-ENTMCNC: 30.9 G/DL (ref 31.5–35.7)
MCV RBC AUTO: 101.9 FL (ref 79–97)
PLATELET # BLD AUTO: 66 10*3/MM3 (ref 140–450)
PMV BLD AUTO: 10.7 FL (ref 6–12)
POTASSIUM SERPL-SCNC: 4.5 MMOL/L (ref 3.5–5.2)
PROT SERPL-MCNC: 6.5 G/DL (ref 6–8.5)
RBC # BLD AUTO: 3.08 10*6/MM3 (ref 4.14–5.8)
SODIUM SERPL-SCNC: 131 MMOL/L (ref 136–145)
WBC NRBC COR # BLD: 4.32 10*3/MM3 (ref 3.4–10.8)

## 2022-06-15 PROCEDURE — 99232 SBSQ HOSP IP/OBS MODERATE 35: CPT | Performed by: INTERNAL MEDICINE

## 2022-06-15 PROCEDURE — 94760 N-INVAS EAR/PLS OXIMETRY 1: CPT

## 2022-06-15 PROCEDURE — 85027 COMPLETE CBC AUTOMATED: CPT | Performed by: INTERNAL MEDICINE

## 2022-06-15 PROCEDURE — 94799 UNLISTED PULMONARY SVC/PX: CPT

## 2022-06-15 PROCEDURE — 80053 COMPREHEN METABOLIC PANEL: CPT | Performed by: INTERNAL MEDICINE

## 2022-06-15 PROCEDURE — 82962 GLUCOSE BLOOD TEST: CPT

## 2022-06-15 PROCEDURE — 63710000001 INSULIN LISPRO (HUMAN) PER 5 UNITS: Performed by: FAMILY MEDICINE

## 2022-06-15 PROCEDURE — 83735 ASSAY OF MAGNESIUM: CPT | Performed by: INTERNAL MEDICINE

## 2022-06-15 PROCEDURE — 63710000001 INSULIN DETEMIR PER 5 UNITS: Performed by: FAMILY MEDICINE

## 2022-06-15 PROCEDURE — 25010000002 ONDANSETRON PER 1 MG: Performed by: HOSPITALIST

## 2022-06-15 RX ADMIN — DICLOFENAC SODIUM 2 G: 10 GEL TOPICAL at 08:34

## 2022-06-15 RX ADMIN — BUDESONIDE 0.5 MG: 0.5 SUSPENSION RESPIRATORY (INHALATION) at 06:22

## 2022-06-15 RX ADMIN — INSULIN DETEMIR 10 UNITS: 100 INJECTION, SOLUTION SUBCUTANEOUS at 08:50

## 2022-06-15 RX ADMIN — HYDROCORTISONE 2.5%: 25 CREAM TOPICAL at 20:56

## 2022-06-15 RX ADMIN — HYDROCORTISONE 2.5%: 25 CREAM TOPICAL at 08:34

## 2022-06-15 RX ADMIN — OXYCODONE HYDROCHLORIDE 10 MG: 5 TABLET ORAL at 08:32

## 2022-06-15 RX ADMIN — DICLOFENAC SODIUM 2 G: 10 GEL TOPICAL at 11:13

## 2022-06-15 RX ADMIN — DICLOFENAC SODIUM 2 G: 10 GEL TOPICAL at 17:46

## 2022-06-15 RX ADMIN — BUSPIRONE HYDROCHLORIDE 15 MG: 15 TABLET ORAL at 08:33

## 2022-06-15 RX ADMIN — FLUTICASONE PROPIONATE 2 SPRAY: 50 SPRAY, METERED NASAL at 08:33

## 2022-06-15 RX ADMIN — LACTULOSE 30 G: 20 SOLUTION ORAL at 17:43

## 2022-06-15 RX ADMIN — OXYCODONE HYDROCHLORIDE 10 MG: 5 TABLET ORAL at 13:52

## 2022-06-15 RX ADMIN — CETIRIZINE HYDROCHLORIDE 10 MG: 10 TABLET, FILM COATED ORAL at 08:32

## 2022-06-15 RX ADMIN — OXYCODONE HYDROCHLORIDE 10 MG: 5 TABLET ORAL at 04:28

## 2022-06-15 RX ADMIN — OXYCODONE HYDROCHLORIDE 10 MG: 5 TABLET ORAL at 17:42

## 2022-06-15 RX ADMIN — LACTULOSE 30 G: 20 SOLUTION ORAL at 08:32

## 2022-06-15 RX ADMIN — WHITE PETROLATUM 1 APPLICATION: 1.75 OINTMENT TOPICAL at 08:35

## 2022-06-15 RX ADMIN — TRAZODONE HYDROCHLORIDE 50 MG: 50 TABLET ORAL at 20:55

## 2022-06-15 RX ADMIN — ONDANSETRON 4 MG: 2 INJECTION INTRAMUSCULAR; INTRAVENOUS at 08:50

## 2022-06-15 RX ADMIN — BUDESONIDE 0.5 MG: 0.5 SUSPENSION RESPIRATORY (INHALATION) at 22:10

## 2022-06-15 RX ADMIN — FUROSEMIDE 40 MG: 40 TABLET ORAL at 20:56

## 2022-06-15 RX ADMIN — OXYCODONE HYDROCHLORIDE 10 MG: 5 TABLET ORAL at 00:23

## 2022-06-15 RX ADMIN — FAMOTIDINE 20 MG: 20 TABLET ORAL at 06:45

## 2022-06-15 RX ADMIN — FAMOTIDINE 20 MG: 20 TABLET ORAL at 17:42

## 2022-06-15 RX ADMIN — INSULIN LISPRO 6 UNITS: 100 INJECTION, SOLUTION INTRAVENOUS; SUBCUTANEOUS at 08:33

## 2022-06-15 RX ADMIN — ONDANSETRON 4 MG: 2 INJECTION INTRAMUSCULAR; INTRAVENOUS at 19:20

## 2022-06-15 RX ADMIN — POLYETHYLENE GLYCOL 400 AND PROPYLENE GLYCOL 2 DROP: 4; 3 SOLUTION/ DROPS OPHTHALMIC at 08:34

## 2022-06-15 RX ADMIN — OXYCODONE HYDROCHLORIDE 10 MG: 5 TABLET ORAL at 22:26

## 2022-06-15 RX ADMIN — Medication 10 ML: at 20:55

## 2022-06-15 RX ADMIN — ATORVASTATIN CALCIUM 40 MG: 40 TABLET, FILM COATED ORAL at 20:55

## 2022-06-15 RX ADMIN — SIMETHICONE 80 MG: 80 TABLET, CHEWABLE ORAL at 19:20

## 2022-06-15 RX ADMIN — SERTRALINE HYDROCHLORIDE 50 MG: 50 TABLET ORAL at 08:33

## 2022-06-15 RX ADMIN — Medication 100 MG: at 08:32

## 2022-06-15 RX ADMIN — Medication 10 ML: at 08:32

## 2022-06-15 RX ADMIN — BUSPIRONE HYDROCHLORIDE 15 MG: 15 TABLET ORAL at 20:55

## 2022-06-15 RX ADMIN — LACTULOSE 30 G: 20 SOLUTION ORAL at 20:55

## 2022-06-15 RX ADMIN — SPIRONOLACTONE 100 MG: 25 TABLET ORAL at 08:33

## 2022-06-15 RX ADMIN — FUROSEMIDE 40 MG: 40 TABLET ORAL at 08:33

## 2022-06-15 RX ADMIN — FOLIC ACID 1 MG: 1 TABLET ORAL at 08:33

## 2022-06-15 RX ADMIN — INSULIN LISPRO 2 UNITS: 100 INJECTION, SOLUTION INTRAVENOUS; SUBCUTANEOUS at 17:42

## 2022-06-15 RX ADMIN — POLYETHYLENE GLYCOL 400 AND PROPYLENE GLYCOL 2 DROP: 4; 3 SOLUTION/ DROPS OPHTHALMIC at 04:29

## 2022-06-15 NOTE — PAYOR COMM NOTE
"Parish Wright (57 y.o. Male)             Date of Birth   1965    Social Security Number       Address   [address] [place] [address]    Home Phone   [phone]    MRN   [id number]       Oriental orthodox   Mormonism    Marital Status                               Admission Date   5/24/22    Admission Type   Emergency    Admitting Provider   Odell Soliz MD    Attending Provider   Odell Soliz MD    Department, Room/Bed   [address], 4001/1       Discharge Date       Discharge Disposition       Discharge Destination                               Attending Provider: Odell Soliz MD    Allergies: No Known Allergies    Isolation: None   Infection: None   Code Status: CPR   Advance Care Planning Activity    Ht: 182.9 cm (72\")   Wt: 106 kg (233 lb 11 oz)    Admission Cmt: None   Principal Problem: None                Active Insurance as of 5/24/2022     Primary Coverage     Payor Plan Insurance Group Employer/Plan Group    [facility] [id number]     Payor Plan Address Payor Plan Phone Number Payor Plan Fax Number Effective Dates    [address]   5/1/2022 - None Entered    [place] [address]       Subscriber Name Subscriber Birth Date Member ID       PARISH WRIGHT 1965 [id number]                 Emergency Contacts      (Rel.) Home Phone Work Phone Mobile Phone    DG MURPHY (Sister) -- -- [phone]    Christo Soria (Friend) [phone] -- --    Nandini Wright (Daughter) -- -- [phone]        #[id number]  UPDATE      CONTACT   [facility] UTILIZATION REVIEW    [facility]  [address] [place] [address]  TAX ID [id number]  NPI  [id number]       [phone]   [phone]                Maryana Carcamo, RN    Registered Nurse   Nursing   Plan of Care       Signed   Date of Service:  06/15/22 0631   Creation Time:  06/15/22 0631              Signed      "         Show:Clear all  []Manual[x]Template[]Copied    Added by:  [x]Maryana Carcamo RN      []Hans for details    Goal Outcome Evaluation:  Plan of Care Reviewed With: patient  Progress: no change  Outcome Evaluation: Patient more subdued than this nurse has experienced in previous shifts. Not as many staff seeking behaviors. Slept some overnight. Requests PRN pain med be given every 4 hours. Reports 4 bowel movements overnight. Nurse witness one large formed movement. Did not want to wear setopres wraps overnight                                   Physician Progress Notes (last 24 hours)      Odell Soliz MD at 06/15/22 1017           [facility] Progress Note  Date: 6/15/2022  Patient Name: Nic Nicolas  : 1965  MRN: [id number]  Date of admission: 2022      Subjective   Subjective     Chief Complaint: Follow up for altered mental status    Summary: 57-year-old male presented with altered mental status subsequently treated for acute hepatic encephalopathy.  Psychiatry was consulted at 1 point due to suicidal ideation.  Patient had as needed medications added at that time.  Orthopedic was consulted due to a right shoulder x-ray revealing a comminuted nondisplaced fracture of the greater tuberosity of the proximal humerus and minimally displaced fracture of the inferior glenoid.  It was recommended patient continue with sling and have follow-up x-ray in 2 weeks.  Currently patient is awaiting rehab placement.    Interval Followup:   Patient still Medicaid pending.  Patient I discussed patient's right shoulder pain.  Patient was noted to have of communicated nondisplaced fracture of the greater tuberosity of the proximal humerus and minimally displaced fracture of the inferior glenoid.  Patient was seen by orthopedic surgery while inpatient, recommendations for follow-up plain film in 2 weeks.  2 weeks will be Friday.  On my exam patient was not in sling however good mobility of arm  no active complaints.  Of note patient is currently receiving oxycodone 10 mg every 4 hours, as an outpatient has had been slowly weaned off.  Will discuss with patient tomorrow the need to slowly start weaning down on this medication while inpatient.  However will obtain plain film first.  No other issues overnight    Review of Systems  All systems reviewed and are negative except as above in HPI.    Objective   Objective     Vitals:   Temp:  [97.2 °F (36.2 °C)-98.1 °F (36.7 °C)] 97.7 °F (36.5 °C)  Heart Rate:  [75-91] 77  Resp:  [12-20] 20  BP: (121-136)/(51-84) 136/51  Physical Exam   Constitutional: Alert, awake, sitting up in bedside chair, appears weak  HEENT:  PERRLA, EOMI; No Scleral icterus  Neck:  Supple; No Mass, No Lymphadenopathy  Cardiovascular:  No Rubs, No Edema, No JVD   Respiratory: No respiratory distress, unlabored breathing, saturating well on room air  Abdomen:  Normal BS all 4 Quadrants; No Guarding, No Tenderness  Musculoskeletal:  Pulses Positive all 4 Ext; No Cyanosis, Edema present to bilateral lower extremities  Neurological: No facial asymmetry, answers questions, intermittently confused, moves all extremities  Psychiatry: No agitation, answering questions apparently  Skin: Bilateral lower extremities with dressing    Result Review    Result Review:  I have personally reviewed the results from the time of this admission to 6/15/2022 10:18 EDT and agree with these findings:  [x]  Laboratory  [x]  Microbiology   [x]  Radiology  []  EKG/Telemetry   []  Cardiology/Vascular   []  Pathology  [x]  Old records  [x]  Other:     Intake/Output Summary (Last 24 hours) at 6/15/2022 1018  Last data filed at 6/15/2022 0715  Gross per 24 hour   Intake 1320 ml   Output --   Net 1320 ml         Assessment & Plan   Assessment / Plan     Assessment:  Hyperactive delirium  Minimally comminuted nondisplaced fracture of the greater tuberosity of the proximal humerus.  Minimally displaced fracture at the  inferior glenoid.  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  Hepatic encephalopathy  ROMO cirrhosis  Pancytopenia likely related to cirrhosis  Insulin-dependent diabetes mellitus - blood sugars controlled  Obstructive sleep apnea not on home CPAP  History of traumatic brain injury  Mild hyperbilirubinemia  Major depression with suicidal ideation  Acute Respiratory Alkalosis , resolved  Thrombocytopenia   Dyslipidemia  Acute kidney injury secondary to prerenal etiology, resolved  Folliculitis      Plan:   · Continue with Atarax for pruritus  · Continue with mupirocin cream on patient's rash  · Continue with delirium precautions  · Continue Seroquel, Haldol as needed for agitation  · Continue with trazodone as needed at night for insomnia  · Continue with lactulose and rifaximin, patient needs at least 2-3 bowel movements daily, can hold once he has 2 bowel movements  · Psychiatry recommendations appreciated, continue BuSpar 15 mg twice daily along with Zoloft 50 mg daily, currently seeing as needed  · Continue thiamine folic acid daily  · Continue with Levemir along with correctional insulin, glucose remains at goal  · Continue with furosemide twice daily and spironolactone daily, monitor renal function and electrolytes  · Case management assistance appreciated, patient will guide placement as his family is unable to care for him, appreciate assistance  · Continue trazodone as needed at night  · Continue with eyedrops  · Continue with Anusol  · Labs reviewed, image reviewed, medications reviewed, vital signs reviewed  · Further recommendations pending clinical course  · We will order repeat right shoulder film for follow-up as recommended by orthopedic surgery later this week  · Will need to do work on weaning patient off pain medications as an outpatient this has been slowly done however patient now on 10mg every 6 hours PRN    Discussed with RN.     DVT prophylaxis:  Mechanical DVT prophylaxis orders are  present.    CODE STATUS:   Code Status (Patient has no pulse and is not breathing): CPR (Attempt to Resuscitate)  Medical Interventions (Patient has pulse or is breathing): Full Support    CBC    CBC 6/11/22 6/14/22 6/15/22   WBC 3.95 3.49 4.32   RBC 2.94 (A) 2.87 (A) 3.08 (A)   Hemoglobin 9.5 (A) 9.4 (A) 9.7 (A)   Hematocrit 28.5 (A) 28.1 (A) 31.4 (A)   MCV 96.9 97.9 (A) 101.9 (A)   MCH 32.3 32.8 31.5   MCHC 33.3 33.5 30.9 (A)   RDW 16.0 (A) 15.5 (A) 15.5 (A)   Platelets 61 (A) 57 (A) 66 (A)   (A) Abnormal value              CMP    CMP 6/12/22 6/14/22 6/15/22   Glucose 146 (A) 221 (A) 138 (A)   BUN 22 (A) 26 (A) 21 (A)   Creatinine 0.89 1.01 0.93   Sodium 131 (A) 131 (A) 131 (A)   Potassium 4.8 4.4 4.5   Chloride 97 (A) 98 98   Calcium 8.9 9.0 9.0   Albumin 3.30 (A) 3.30 (A) 3.20 (A)   Total Bilirubin 1.1 0.9 1.1   Alkaline Phosphatase 113 118 (A) 111   AST (SGOT) 48 (A) 50 (A) 55 (A)   ALT (SGPT) 29 29 30   (A) Abnormal value                  Electronically signed by Odell Soliz MD at 06/15/22 1021     Odell Soliz MD at 22 1319           [facility]ist Progress Note  Date: 2022  Patient Name: Nic Nicolas  : 1965  MRN: [id number]  Date of admission: 2022      Subjective   Subjective     Chief Complaint: Follow up for altered mental status    Summary: 57-year-old male presented with altered mental status subsequently treated for acute hepatic encephalopathy.  Psychiatry was consulted at 1 point due to suicidal ideation.  Patient had as needed medications added at that time.  Orthopedic was consulted due to a right shoulder x-ray revealing a comminuted nondisplaced fracture of the greater tuberosity of the proximal humerus and minimally displaced fracture of the inferior glenoid.  It was recommended patient continue with sling and have follow-up x-ray in 2 weeks.  Currently patient is awaiting rehab placement.      Interval Followup:   Patient sitting in bedside chair  today, no acute complaints no issues overnight.  Still working towards appropriate disposition.  Patient still Medicaid pending    Review of Systems  All systems reviewed and are negative except as above in HPI.    Objective   Objective     Vitals:   Temp:  [97.2 °F (36.2 °C)-98.1 °F (36.7 °C)] 97.2 °F (36.2 °C)  Heart Rate:  [66-98] 75  Resp:  [12-20] 12  BP: (109-142)/(57-65) 133/60  Physical Exam   Constitutional: Alert, awake, sitting up in bedside chair, appears weak, hyperactive  HEENT:  PERRLA, EOMI; No Scleral icterus  Neck:  Supple; No Mass, No Lymphadenopathy  Cardiovascular:  No Rubs, No Edema, No JVD   Respiratory: No respiratory distress, unlabored breathing, saturating well on room air  Abdomen:  Normal BS all 4 Quadrants; No Guarding, No Tenderness  Musculoskeletal:  Pulses Positive all 4 Ext; No Cyanosis, No Edema  Neurological: No facial asymmetry, answers questions, intermittently confused, moves all extremities  Psychiatry: No agitation, intermittently hyperactive l  Skin: Lateral lower extremities with dressing    Result Review    Result Review:  I have personally reviewed the results from the time of this admission to 6/14/2022 13:19 EDT and agree with these findings:  [x]  Laboratory  [x]  Microbiology   []  Radiology  []  EKG/Telemetry   []  Cardiology/Vascular   []  Pathology  [x]  Old records  [x]  Other:     Intake/Output Summary (Last 24 hours) at 6/14/2022 1319  Last data filed at 6/14/2022 0900  Gross per 24 hour   Intake 600 ml   Output --   Net 600 ml         Assessment & Plan   Assessment / Plan     Assessment:  Hyperactive delirium  Minimally comminuted nondisplaced fracture of the greater tuberosity of the proximal humerus.  Minimally displaced fracture at the inferior glenoid.  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  Hepatic encephalopathy  ROMO cirrhosis  Pancytopenia likely related to cirrhosis  Insulin-dependent diabetes mellitus - blood sugars controlled  Obstructive sleep apnea  not on home CPAP  History of traumatic brain injury  Mild hyperbilirubinemia  Major depression with suicidal ideation  Acute Respiratory Alkalosis , resolved  Thrombocytopenia   Dyslipidemia  Acute kidney injury secondary to prerenal etiology, resolved  Folliculitis      Plan:   · Continue with Atarax for pruritus  · Continue with mupirocin cream on patient's rash  · Continue with delirium precautions  · Continue Seroquel, Haldol as needed for agitation  · Continue with trazodone as needed at night for insomnia  · Continue with lactulose and rifaximin, patient needs at least 2-3 bowel movements daily, can hold once he has 2 bowel movements  · Psychiatry recommendations appreciated, continue BuSpar 15 mg twice daily along with Zoloft 50 mg daily, currently seeing as needed  · Continue thiamine folic acid daily  · Continue with Levemir along with correctional insulin, glucose remains at goal  · Continue with furosemide twice daily and spironolactone daily  · Case management assistance appreciated, patient will guide placement as his family is unable to care for him, appreciate assistance  · Continue trazodone as needed at night  · Continue with eyedrops  · Continue with Anusol  · Labs reviewed, image reviewed, medications reviewed, vital signs reviewed  · Further recommendations pending clinical course    Discussed with RN.     DVT prophylaxis:  Mechanical DVT prophylaxis orders are present.    CODE STATUS:   Code Status (Patient has no pulse and is not breathing): CPR (Attempt to Resuscitate)  Medical Interventions (Patient has pulse or is breathing): Full Support    CBC    CBC 6/10/22 6/11/22 6/14/22   WBC 4.25 3.95 3.49   RBC 2.97 (A) 2.94 (A) 2.87 (A)   Hemoglobin 9.7 (A) 9.5 (A) 9.4 (A)   Hematocrit 31.2 (A) 28.5 (A) 28.1 (A)   .1 (A) 96.9 97.9 (A)   MCH 32.7 32.3 32.8   MCHC 31.1 (A) 33.3 33.5   RDW 16.0 (A) 16.0 (A) 15.5 (A)   Platelets 50 (A) 61 (A) 57 (A)   (A) Abnormal value              CMP    CMP  6/11/22 6/12/22 6/14/22   Glucose 231 (A) 146 (A) 221 (A)   BUN 16 22 (A) 26 (A)   Creatinine 0.98 0.89 1.01   Sodium 133 (A) 131 (A) 131 (A)   Potassium 4.5 4.8 4.4   Chloride 100 97 (A) 98   Calcium 8.7 8.9 9.0   Albumin 3.10 (A) 3.30 (A) 3.30 (A)   Total Bilirubin 0.9 1.1 0.9   Alkaline Phosphatase 131 (A) 113 118 (A)   AST (SGOT) 48 (A) 48 (A) 50 (A)   ALT (SGPT) 32 29 29   (A) Abnormal value                  Electronically signed by Odell Soliz MD at 06/14/22 2222
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
## 2022-06-15 NOTE — PLAN OF CARE
Goal Outcome Evaluation:  Plan of Care Reviewed With: patient        Progress: no change  Outcome Evaluation: Patient more subdued than this nurse has experienced in previous shifts. Not as many staff seeking behaviors. Slept some overnight. Requests PRN pain med be given every 4 hours. Reports 4 bowel movements overnight. Nurse witness one large formed movement. Did not want to wear setopres wraps overnight

## 2022-06-15 NOTE — PROGRESS NOTES
[facility]   Hospitalist Progress Note  Date: 6/15/2022  Patient Name: Nic Nicolas  : 1965  MRN: [id number]  Date of admission: 2022      Subjective   Subjective     Chief Complaint: Follow up for altered mental status    Summary: 57-year-old male presented with altered mental status subsequently treated for acute hepatic encephalopathy.  Psychiatry was consulted at 1 point due to suicidal ideation.  Patient had as needed medications added at that time.  Orthopedic was consulted due to a right shoulder x-ray revealing a comminuted nondisplaced fracture of the greater tuberosity of the proximal humerus and minimally displaced fracture of the inferior glenoid.  It was recommended patient continue with sling and have follow-up x-ray in 2 weeks.  Currently patient is awaiting rehab placement.    Interval Followup:   Patient still Medicaid pending.  Patient I discussed patient's right shoulder pain.  Patient was noted to have of communicated nondisplaced fracture of the greater tuberosity of the proximal humerus and minimally displaced fracture of the inferior glenoid.  Patient was seen by orthopedic surgery while inpatient, recommendations for follow-up plain film in 2 weeks.  2 weeks will be Friday.  On my exam patient was not in sling however good mobility of arm no active complaints.  Of note patient is currently receiving oxycodone 10 mg every 4 hours, as an outpatient has had been slowly weaned off.  Will discuss with patient tomorrow the need to slowly start weaning down on this medication while inpatient.  However will obtain plain film first.  No other issues overnight    Review of Systems  All systems reviewed and are negative except as above in HPI.    Objective   Objective     Vitals:   Temp:  [97.2 °F (36.2 °C)-98.1 °F (36.7 °C)] 97.7 °F (36.5 °C)  Heart Rate:  [75-91] 77  Resp:  [12-20] 20  BP: (121-136)/(51-84) 136/51  Physical Exam   Constitutional: Alert, awake, sitting up in  bedside chair, appears weak  HEENT:  PERRLA, EOMI; No Scleral icterus  Neck:  Supple; No Mass, No Lymphadenopathy  Cardiovascular:  No Rubs, No Edema, No JVD   Respiratory: No respiratory distress, unlabored breathing, saturating well on room air  Abdomen:  Normal BS all 4 Quadrants; No Guarding, No Tenderness  Musculoskeletal:  Pulses Positive all 4 Ext; No Cyanosis, Edema present to bilateral lower extremities  Neurological: No facial asymmetry, answers questions, intermittently confused, moves all extremities  Psychiatry: No agitation, answering questions apparently  Skin: Bilateral lower extremities with dressing    Result Review    Result Review:  I have personally reviewed the results from the time of this admission to 6/15/2022 10:18 EDT and agree with these findings:  [x]  Laboratory  [x]  Microbiology   [x]  Radiology  []  EKG/Telemetry   []  Cardiology/Vascular   []  Pathology  [x]  Old records  [x]  Other:     Intake/Output Summary (Last 24 hours) at 6/15/2022 1018  Last data filed at 6/15/2022 0715  Gross per 24 hour   Intake 1320 ml   Output --   Net 1320 ml         Assessment & Plan   Assessment / Plan     Assessment:  Hyperactive delirium  Minimally comminuted nondisplaced fracture of the greater tuberosity of the proximal humerus.  Minimally displaced fracture at the inferior glenoid.  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  Hepatic encephalopathy  ROMO cirrhosis  Pancytopenia likely related to cirrhosis  Insulin-dependent diabetes mellitus - blood sugars controlled  Obstructive sleep apnea not on home CPAP  History of traumatic brain injury  Mild hyperbilirubinemia  Major depression with suicidal ideation  Acute Respiratory Alkalosis , resolved  Thrombocytopenia   Dyslipidemia  Acute kidney injury secondary to prerenal etiology, resolved  Folliculitis      Plan:   · Continue with Atarax for pruritus  · Continue with mupirocin cream on patient's rash  · Continue with delirium precautions  · Continue  Seroquel, Haldol as needed for agitation  · Continue with trazodone as needed at night for insomnia  · Continue with lactulose and rifaximin, patient needs at least 2-3 bowel movements daily, can hold once he has 2 bowel movements  · Psychiatry recommendations appreciated, continue BuSpar 15 mg twice daily along with Zoloft 50 mg daily, currently seeing as needed  · Continue thiamine folic acid daily  · Continue with Levemir along with correctional insulin, glucose remains at goal  · Continue with furosemide twice daily and spironolactone daily, monitor renal function and electrolytes  · Case management assistance appreciated, patient will guide placement as his family is unable to care for him, appreciate assistance  · Continue trazodone as needed at night  · Continue with eyedrops  · Continue with Anusol  · Labs reviewed, image reviewed, medications reviewed, vital signs reviewed  · Further recommendations pending clinical course  · We will order repeat right shoulder film for follow-up as recommended by orthopedic surgery later this week  · Will need to do work on weaning patient off pain medications as an outpatient this has been slowly done however patient now on 10mg every 6 hours PRN    Discussed with RN.     DVT prophylaxis:  Mechanical DVT prophylaxis orders are present.    CODE STATUS:   Code Status (Patient has no pulse and is not breathing): CPR (Attempt to Resuscitate)  Medical Interventions (Patient has pulse or is breathing): Full Support    CBC    CBC 6/11/22 6/14/22 6/15/22   WBC 3.95 3.49 4.32   RBC 2.94 (A) 2.87 (A) 3.08 (A)   Hemoglobin 9.5 (A) 9.4 (A) 9.7 (A)   Hematocrit 28.5 (A) 28.1 (A) 31.4 (A)   MCV 96.9 97.9 (A) 101.9 (A)   MCH 32.3 32.8 31.5   MCHC 33.3 33.5 30.9 (A)   RDW 16.0 (A) 15.5 (A) 15.5 (A)   Platelets 61 (A) 57 (A) 66 (A)   (A) Abnormal value              CMP    CMP 6/12/22 6/14/22 6/15/22   Glucose 146 (A) 221 (A) 138 (A)   BUN 22 (A) 26 (A) 21 (A)   Creatinine 0.89 1.01 0.93    Sodium 131 (A) 131 (A) 131 (A)   Potassium 4.8 4.4 4.5   Chloride 97 (A) 98 98   Calcium 8.9 9.0 9.0   Albumin 3.30 (A) 3.30 (A) 3.20 (A)   Total Bilirubin 1.1 0.9 1.1   Alkaline Phosphatase 113 118 (A) 111   AST (SGOT) 48 (A) 50 (A) 55 (A)   ALT (SGPT) 29 29 30   (A) Abnormal value

## 2022-06-15 NOTE — PLAN OF CARE
Goal Outcome Evaluation:  Plan of Care Reviewed With: patient        Progress: no change  Outcome Evaluation: vital signs stable throughout shift. pt alert and oriented x 4. pt up ad myrtle in room today. pt had shower today. skin care performed per orders. pt medicated for pain per orders. prn zofran given once this shift per orders. no other complaints at this time.

## 2022-06-16 ENCOUNTER — APPOINTMENT (OUTPATIENT)
Dept: GENERAL RADIOLOGY | Facility: HOSPITAL | Age: 57
End: 2022-06-16

## 2022-06-16 LAB
ANION GAP SERPL CALCULATED.3IONS-SCNC: 9.6 MMOL/L (ref 5–15)
BUN SERPL-MCNC: 19 MG/DL (ref 6–20)
BUN/CREAT SERPL: 20 (ref 7–25)
CALCIUM SPEC-SCNC: 9 MG/DL (ref 8.6–10.5)
CHLORIDE SERPL-SCNC: 97 MMOL/L (ref 98–107)
CO2 SERPL-SCNC: 24.4 MMOL/L (ref 22–29)
CREAT SERPL-MCNC: 0.95 MG/DL (ref 0.76–1.27)
EGFRCR SERPLBLD CKD-EPI 2021: 93.9 ML/MIN/1.73
GLUCOSE BLDC GLUCOMTR-MCNC: 120 MG/DL (ref 70–99)
GLUCOSE BLDC GLUCOMTR-MCNC: 132 MG/DL (ref 70–99)
GLUCOSE BLDC GLUCOMTR-MCNC: 141 MG/DL (ref 70–99)
GLUCOSE BLDC GLUCOMTR-MCNC: 192 MG/DL (ref 70–99)
GLUCOSE SERPL-MCNC: 194 MG/DL (ref 65–99)
MAGNESIUM SERPL-MCNC: 1.7 MG/DL (ref 1.6–2.6)
POTASSIUM SERPL-SCNC: 4.4 MMOL/L (ref 3.5–5.2)
SODIUM SERPL-SCNC: 131 MMOL/L (ref 136–145)

## 2022-06-16 PROCEDURE — 94760 N-INVAS EAR/PLS OXIMETRY 1: CPT

## 2022-06-16 PROCEDURE — 63710000001 INSULIN LISPRO (HUMAN) PER 5 UNITS: Performed by: FAMILY MEDICINE

## 2022-06-16 PROCEDURE — 83735 ASSAY OF MAGNESIUM: CPT | Performed by: INTERNAL MEDICINE

## 2022-06-16 PROCEDURE — 82962 GLUCOSE BLOOD TEST: CPT

## 2022-06-16 PROCEDURE — 25010000002 FUROSEMIDE PER 20 MG: Performed by: INTERNAL MEDICINE

## 2022-06-16 PROCEDURE — 94799 UNLISTED PULMONARY SVC/PX: CPT

## 2022-06-16 PROCEDURE — 80048 BASIC METABOLIC PNL TOTAL CA: CPT | Performed by: INTERNAL MEDICINE

## 2022-06-16 PROCEDURE — 99232 SBSQ HOSP IP/OBS MODERATE 35: CPT | Performed by: INTERNAL MEDICINE

## 2022-06-16 PROCEDURE — 63710000001 INSULIN DETEMIR PER 5 UNITS: Performed by: FAMILY MEDICINE

## 2022-06-16 PROCEDURE — 73030 X-RAY EXAM OF SHOULDER: CPT

## 2022-06-16 RX ORDER — FUROSEMIDE 10 MG/ML
40 INJECTION INTRAMUSCULAR; INTRAVENOUS
Status: DISCONTINUED | OUTPATIENT
Start: 2022-06-16 | End: 2022-06-17 | Stop reason: HOSPADM

## 2022-06-16 RX ORDER — OXYCODONE HYDROCHLORIDE 5 MG/1
10 TABLET ORAL EVERY 8 HOURS PRN
Status: DISCONTINUED | OUTPATIENT
Start: 2022-06-16 | End: 2022-06-17 | Stop reason: HOSPADM

## 2022-06-16 RX ADMIN — FUROSEMIDE 40 MG: 10 INJECTION, SOLUTION INTRAMUSCULAR; INTRAVENOUS at 18:00

## 2022-06-16 RX ADMIN — FAMOTIDINE 20 MG: 20 TABLET ORAL at 06:34

## 2022-06-16 RX ADMIN — HYDROCORTISONE 2.5%: 25 CREAM TOPICAL at 08:42

## 2022-06-16 RX ADMIN — INSULIN DETEMIR 10 UNITS: 100 INJECTION, SOLUTION SUBCUTANEOUS at 08:42

## 2022-06-16 RX ADMIN — WHITE PETROLATUM 1 APPLICATION: 1.75 OINTMENT TOPICAL at 08:41

## 2022-06-16 RX ADMIN — DICLOFENAC SODIUM 2 G: 10 GEL TOPICAL at 18:01

## 2022-06-16 RX ADMIN — Medication 10 ML: at 20:13

## 2022-06-16 RX ADMIN — DICLOFENAC SODIUM 2 G: 10 GEL TOPICAL at 13:25

## 2022-06-16 RX ADMIN — INSULIN LISPRO 2 UNITS: 100 INJECTION, SOLUTION INTRAVENOUS; SUBCUTANEOUS at 13:25

## 2022-06-16 RX ADMIN — HYDROCORTISONE 2.5%: 25 CREAM TOPICAL at 20:14

## 2022-06-16 RX ADMIN — BUSPIRONE HYDROCHLORIDE 15 MG: 15 TABLET ORAL at 08:38

## 2022-06-16 RX ADMIN — OXYCODONE HYDROCHLORIDE 10 MG: 5 TABLET ORAL at 06:34

## 2022-06-16 RX ADMIN — FUROSEMIDE 40 MG: 10 INJECTION, SOLUTION INTRAMUSCULAR; INTRAVENOUS at 08:39

## 2022-06-16 RX ADMIN — FOLIC ACID 1 MG: 1 TABLET ORAL at 08:38

## 2022-06-16 RX ADMIN — TRAZODONE HYDROCHLORIDE 50 MG: 50 TABLET ORAL at 23:00

## 2022-06-16 RX ADMIN — POLYETHYLENE GLYCOL 400 AND PROPYLENE GLYCOL 2 DROP: 4; 3 SOLUTION/ DROPS OPHTHALMIC at 08:40

## 2022-06-16 RX ADMIN — FAMOTIDINE 20 MG: 20 TABLET ORAL at 18:00

## 2022-06-16 RX ADMIN — OXYCODONE HYDROCHLORIDE 10 MG: 5 TABLET ORAL at 23:00

## 2022-06-16 RX ADMIN — CETIRIZINE HYDROCHLORIDE 10 MG: 10 TABLET, FILM COATED ORAL at 08:38

## 2022-06-16 RX ADMIN — DICLOFENAC SODIUM 2 G: 10 GEL TOPICAL at 20:13

## 2022-06-16 RX ADMIN — OXYCODONE HYDROCHLORIDE 10 MG: 5 TABLET ORAL at 14:45

## 2022-06-16 RX ADMIN — BUDESONIDE 0.5 MG: 0.5 SUSPENSION RESPIRATORY (INHALATION) at 22:44

## 2022-06-16 RX ADMIN — BUSPIRONE HYDROCHLORIDE 15 MG: 15 TABLET ORAL at 20:13

## 2022-06-16 RX ADMIN — Medication 10 ML: at 08:40

## 2022-06-16 RX ADMIN — SERTRALINE HYDROCHLORIDE 50 MG: 50 TABLET ORAL at 08:37

## 2022-06-16 RX ADMIN — MUPIROCIN 1 APPLICATION: 20 OINTMENT TOPICAL at 08:42

## 2022-06-16 RX ADMIN — BUDESONIDE 0.5 MG: 0.5 SUSPENSION RESPIRATORY (INHALATION) at 07:53

## 2022-06-16 RX ADMIN — LACTULOSE 30 G: 20 SOLUTION ORAL at 20:13

## 2022-06-16 RX ADMIN — LACTULOSE 30 G: 20 SOLUTION ORAL at 08:38

## 2022-06-16 RX ADMIN — DICLOFENAC SODIUM 2 G: 10 GEL TOPICAL at 08:40

## 2022-06-16 RX ADMIN — LACTULOSE 30 G: 20 SOLUTION ORAL at 17:59

## 2022-06-16 RX ADMIN — OXYCODONE HYDROCHLORIDE 10 MG: 5 TABLET ORAL at 02:26

## 2022-06-16 RX ADMIN — SPIRONOLACTONE 100 MG: 25 TABLET ORAL at 08:37

## 2022-06-16 RX ADMIN — ATORVASTATIN CALCIUM 40 MG: 40 TABLET, FILM COATED ORAL at 20:13

## 2022-06-16 RX ADMIN — MUPIROCIN 1 APPLICATION: 20 OINTMENT TOPICAL at 20:14

## 2022-06-16 RX ADMIN — Medication 100 MG: at 08:37

## 2022-06-16 RX ADMIN — FLUTICASONE PROPIONATE 2 SPRAY: 50 SPRAY, METERED NASAL at 08:40

## 2022-06-16 NOTE — PLAN OF CARE
Goal Outcome Evaluation:  Plan of Care Reviewed With: patient        Progress: no change  Outcome Evaluation: PRN pain meds given per MAR per patient request. Says he wants to be awakened when they are due. Reports at least 3 bowel movements overnight. Feet are becoming more edematous. Encouraged patient to stay in bed or sit in chair with feet up

## 2022-06-16 NOTE — PLAN OF CARE
Goal Outcome Evaluation:              Outcome Evaluation: Possible D/c Tomorrow. Patient tolerated q8hr pain meds with intermitten volteran cream for right shoulder. Patient constantly in motion, dressings fall off legs. New orders placed for ace wraps to help compression. Social work invovled to help faciltiate DC planning .

## 2022-06-16 NOTE — PROGRESS NOTES
[facility]   Hospitalist Progress Note  Date: 2022  Patient Name: Nic Nicolas  : 1965  MRN: [id number]  Date of admission: 2022      Subjective   Subjective     Chief Complaint: Follow up for altered mental status    Summary: 57-year-old male presented with altered mental status subsequently treated for acute hepatic encephalopathy.  Psychiatry was consulted at 1 point due to suicidal ideation.  Patient had as needed medications added at that time.  Orthopedic was consulted due to a right shoulder x-ray revealing a comminuted nondisplaced fracture of the greater tuberosity of the proximal humerus and minimally displaced fracture of the inferior glenoid.  It was recommended patient continue with sling and have follow-up x-ray in 2 weeks.      Interval Followup:   Patient has been denied for SNF, his strength is back to his baseline.  After discussing with patient plan to discharge tomorrow.  Will order plain film of patient's right shoulder today.  Further review of patient's chart indicated that he was on MS Contin as an outpatient.  Patient requesting to be placed back on this dosing, however discussed with patient, we will wean down on his pain meds today.  Patient will be discharged with only a few doses of pain meds, however PCP recently worked to get him off of pain meds will not set him up for ongoing dosings.  Plan to discharge patient tomorrow    Review of Systems  All systems reviewed and are negative except as above in HPI.    Objective   Objective     Vitals:   Temp:  [97.2 °F (36.2 °C)-98.8 °F (37.1 °C)] 98.1 °F (36.7 °C)  Heart Rate:  [] 69  Resp:  [16-20] 20  BP: (117-159)/(56-77) 138/66  Physical Exam   Constitutional: Alert, awake, lying in bed comfortably  HEENT:  PERRLA, EOMI; No Scleral icterus  Neck:  Supple; No Mass, No Lymphadenopathy  Cardiovascular:  No Rubs, No Edema, No JVD   Respiratory: No respiratory distress, unlabored breathing, saturating well on room  air  Abdomen:  Normal BS all 4 Quadrants; No Guarding, No Tenderness  Musculoskeletal:  Pulses Positive all 4 Ext; No Cyanosis, Edema present to bilateral lower extremities  Neurological: No facial asymmetry, answers questions, intermittently confused, moves all extremities  Psychiatry: No agitation, answering questions apparently  Skin: Bilateral lower extremities with dressing    Result Review    Result Review:  I have personally reviewed the results from the time of this admission to 6/16/2022 15:08 EDT and agree with these findings:  [x]  Laboratory  [x]  Microbiology   [x]  Radiology  []  EKG/Telemetry   []  Cardiology/Vascular   []  Pathology  [x]  Old records  []  Other:       Assessment & Plan   Assessment / Plan     Assessment:  Hyperactive delirium  Minimally comminuted nondisplaced fracture of the greater tuberosity of the proximal humerus.  Minimally displaced fracture at the inferior glenoid.  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  Hepatic encephalopathy  ROMO cirrhosis  Pancytopenia likely related to cirrhosis  Insulin-dependent diabetes mellitus - blood sugars controlled  Obstructive sleep apnea not on home CPAP  History of traumatic brain injury  Mild hyperbilirubinemia  Major depression with suicidal ideation  Acute Respiratory Alkalosis , resolved  Thrombocytopenia   Dyslipidemia  Acute kidney injury secondary to prerenal etiology, resolved  Folliculitis      Plan:   · Plan to discharge tomorrow, patient's been denied for SNF discharge  · Continue with Atarax for pruritus  · Continue with mupirocin cream on patient's rash  · Continue with delirium precautions  · Continue Seroquel, Haldol as needed for agitation  · Continue with trazodone as needed at night for insomnia  · Continue with lactulose and rifaximin, patient needs at least 2-3 bowel movements daily, can hold once he has 2 bowel movements  · Psychiatry recommendations appreciated, continue BuSpar 15 mg twice daily along with Zoloft 50 mg  daily, currently seeing as needed  · Continue thiamine folic acid daily  · Continue with Levemir along with correctional insulin, glucose remains at goal  · Continue with furosemide twice daily and spironolactone daily, monitor renal function and electrolytes  · Case management assistance appreciated, patient will guide placement as his family is unable to care for him, appreciate assistance  · Continue trazodone as needed at night  · Continue with eyedrops  · Continue with Anusol  · Further recommendations pending clinical course  · Repeating plain film of right humerus  · Significantly reducing dosing of pain meds, patient will be discharged with a very small amount    Discussed with RN,     DVT prophylaxis:  Mechanical DVT prophylaxis orders are present.    CODE STATUS:   Code Status (Patient has no pulse and is not breathing): CPR (Attempt to Resuscitate)  Medical Interventions (Patient has pulse or is breathing): Full Support    CBC    CBC 6/11/22 6/14/22 6/15/22   WBC 3.95 3.49 4.32   RBC 2.94 (A) 2.87 (A) 3.08 (A)   Hemoglobin 9.5 (A) 9.4 (A) 9.7 (A)   Hematocrit 28.5 (A) 28.1 (A) 31.4 (A)   MCV 96.9 97.9 (A) 101.9 (A)   MCH 32.3 32.8 31.5   MCHC 33.3 33.5 30.9 (A)   RDW 16.0 (A) 15.5 (A) 15.5 (A)   Platelets 61 (A) 57 (A) 66 (A)   (A) Abnormal value              CMP    CMP 6/14/22 6/15/22 6/16/22   Glucose 221 (A) 138 (A) 194 (A)   BUN 26 (A) 21 (A) 19   Creatinine 1.01 0.93 0.95   Sodium 131 (A) 131 (A) 131 (A)   Potassium 4.4 4.5 4.4   Chloride 98 98 97 (A)   Calcium 9.0 9.0 9.0   Albumin 3.30 (A) 3.20 (A)    Total Bilirubin 0.9 1.1    Alkaline Phosphatase 118 (A) 111    AST (SGOT) 50 (A) 55 (A)    ALT (SGPT) 29 30    (A) Abnormal value       Comments are available for some flowsheets but are not being displayed.

## 2022-06-17 ENCOUNTER — READMISSION MANAGEMENT (OUTPATIENT)
Dept: CALL CENTER | Facility: HOSPITAL | Age: 57
End: 2022-06-17

## 2022-06-17 VITALS
OXYGEN SATURATION: 95 % | WEIGHT: 233.69 LBS | HEART RATE: 87 BPM | RESPIRATION RATE: 16 BRPM | SYSTOLIC BLOOD PRESSURE: 147 MMHG | TEMPERATURE: 98.2 F | DIASTOLIC BLOOD PRESSURE: 65 MMHG | HEIGHT: 72 IN | BODY MASS INDEX: 31.65 KG/M2

## 2022-06-17 LAB — GLUCOSE BLDC GLUCOMTR-MCNC: 109 MG/DL (ref 70–99)

## 2022-06-17 PROCEDURE — 25010000002 FUROSEMIDE PER 20 MG: Performed by: INTERNAL MEDICINE

## 2022-06-17 PROCEDURE — 82962 GLUCOSE BLOOD TEST: CPT

## 2022-06-17 PROCEDURE — 63710000001 INSULIN DETEMIR PER 5 UNITS: Performed by: FAMILY MEDICINE

## 2022-06-17 PROCEDURE — 99239 HOSP IP/OBS DSCHRG MGMT >30: CPT | Performed by: INTERNAL MEDICINE

## 2022-06-17 RX ORDER — SERTRALINE HYDROCHLORIDE 25 MG/1
25 TABLET, FILM COATED ORAL DAILY
Qty: 30 TABLET | Refills: 0 | Status: SHIPPED | OUTPATIENT
Start: 2022-06-17 | End: 2022-06-27 | Stop reason: DRUGHIGH

## 2022-06-17 RX ORDER — ATORVASTATIN CALCIUM 40 MG/1
40 TABLET, FILM COATED ORAL DAILY
Qty: 90 TABLET | Refills: 3 | Status: SHIPPED | OUTPATIENT
Start: 2022-06-17 | End: 2023-01-20 | Stop reason: SDUPTHER

## 2022-06-17 RX ORDER — FLUTICASONE PROPIONATE 50 MCG
1 SPRAY, SUSPENSION (ML) NASAL DAILY
Qty: 16 G | Refills: 3 | Status: SHIPPED | OUTPATIENT
Start: 2022-06-17 | End: 2022-09-08

## 2022-06-17 RX ORDER — SPIRONOLACTONE 100 MG/1
100 TABLET, FILM COATED ORAL DAILY
Qty: 180 TABLET | Refills: 1 | Status: SHIPPED | OUTPATIENT
Start: 2022-06-17 | End: 2022-06-20 | Stop reason: SDUPTHER

## 2022-06-17 RX ORDER — LACTULOSE 10 G/15ML
20 SOLUTION ORAL 3 TIMES DAILY
Qty: 1892 ML | Refills: 3 | Status: SHIPPED | OUTPATIENT
Start: 2022-06-17 | End: 2022-11-04

## 2022-06-17 RX ORDER — BUSPIRONE HYDROCHLORIDE 7.5 MG/1
7.5 TABLET ORAL 3 TIMES DAILY
Qty: 90 TABLET | Refills: 1 | Status: SHIPPED | OUTPATIENT
Start: 2022-06-17 | End: 2022-06-21

## 2022-06-17 RX ORDER — FLUTICASONE PROPIONATE 110 UG/1
1 AEROSOL, METERED RESPIRATORY (INHALATION)
Qty: 12 G | Refills: 12 | Status: SHIPPED | OUTPATIENT
Start: 2022-06-17

## 2022-06-17 RX ORDER — ALBUTEROL SULFATE 90 UG/1
2 AEROSOL, METERED RESPIRATORY (INHALATION) EVERY 4 HOURS PRN
Qty: 18 G | Refills: 0 | Status: ON HOLD | OUTPATIENT
Start: 2022-06-17 | End: 2022-12-21

## 2022-06-17 RX ORDER — POTASSIUM CHLORIDE 20 MEQ/1
20 TABLET, EXTENDED RELEASE ORAL DAILY
Qty: 90 TABLET | Refills: 0 | Status: SHIPPED | OUTPATIENT
Start: 2022-06-17 | End: 2022-07-20

## 2022-06-17 RX ORDER — FOLIC ACID 1 MG/1
1 TABLET ORAL DAILY
Qty: 30 TABLET | Refills: 0 | Status: SHIPPED | OUTPATIENT
Start: 2022-06-18 | End: 2022-07-18

## 2022-06-17 RX ORDER — INSULIN LISPRO 100 [IU]/ML
0-7 INJECTION, SOLUTION INTRAVENOUS; SUBCUTANEOUS
Qty: 15 ML | Refills: 3 | Status: SHIPPED | OUTPATIENT
Start: 2022-06-17 | End: 2022-06-20

## 2022-06-17 RX ORDER — FUROSEMIDE 40 MG/1
40 TABLET ORAL 2 TIMES DAILY
Qty: 180 TABLET | Refills: 3 | Status: ON HOLD | OUTPATIENT
Start: 2022-06-17 | End: 2022-12-22 | Stop reason: SDUPTHER

## 2022-06-17 RX ORDER — OMEPRAZOLE 40 MG/1
40 CAPSULE, DELAYED RELEASE ORAL 2 TIMES DAILY
Qty: 180 CAPSULE | Refills: 3 | Status: SHIPPED | OUTPATIENT
Start: 2022-06-17 | End: 2022-06-27 | Stop reason: SDUPTHER

## 2022-06-17 RX ORDER — OXYCODONE HYDROCHLORIDE 10 MG/1
10 TABLET ORAL EVERY 8 HOURS PRN
Qty: 6 TABLET | Refills: 0 | Status: SHIPPED | OUTPATIENT
Start: 2022-06-17 | End: 2022-06-20

## 2022-06-17 RX ADMIN — SIMETHICONE 80 MG: 80 TABLET, CHEWABLE ORAL at 15:03

## 2022-06-17 RX ADMIN — SPIRONOLACTONE 100 MG: 25 TABLET ORAL at 09:10

## 2022-06-17 RX ADMIN — Medication 10 ML: at 09:12

## 2022-06-17 RX ADMIN — FLUTICASONE PROPIONATE 2 SPRAY: 50 SPRAY, METERED NASAL at 09:13

## 2022-06-17 RX ADMIN — BUSPIRONE HYDROCHLORIDE 15 MG: 15 TABLET ORAL at 09:10

## 2022-06-17 RX ADMIN — MUPIROCIN 1 APPLICATION: 20 OINTMENT TOPICAL at 09:14

## 2022-06-17 RX ADMIN — FAMOTIDINE 20 MG: 20 TABLET ORAL at 09:10

## 2022-06-17 RX ADMIN — OXYCODONE HYDROCHLORIDE 10 MG: 5 TABLET ORAL at 09:12

## 2022-06-17 RX ADMIN — DICLOFENAC SODIUM 2 G: 10 GEL TOPICAL at 09:13

## 2022-06-17 RX ADMIN — Medication 100 MG: at 09:11

## 2022-06-17 RX ADMIN — CETIRIZINE HYDROCHLORIDE 10 MG: 10 TABLET, FILM COATED ORAL at 09:11

## 2022-06-17 RX ADMIN — FOLIC ACID 1 MG: 1 TABLET ORAL at 09:11

## 2022-06-17 RX ADMIN — HYDROCORTISONE 2.5%: 25 CREAM TOPICAL at 09:14

## 2022-06-17 RX ADMIN — SERTRALINE HYDROCHLORIDE 50 MG: 50 TABLET ORAL at 09:11

## 2022-06-17 RX ADMIN — INSULIN DETEMIR 10 UNITS: 100 INJECTION, SOLUTION SUBCUTANEOUS at 09:15

## 2022-06-17 RX ADMIN — FUROSEMIDE 40 MG: 10 INJECTION, SOLUTION INTRAMUSCULAR; INTRAVENOUS at 09:10

## 2022-06-17 RX ADMIN — LACTULOSE 30 G: 20 SOLUTION ORAL at 09:10

## 2022-06-17 RX ADMIN — SIMETHICONE 80 MG: 80 TABLET, CHEWABLE ORAL at 00:57

## 2022-06-17 NOTE — DISCHARGE SUMMARY
[facility]         HOSPITALIST  DISCHARGE SUMMARY    Patient Name: Nic Nicolas  : 1965  MRN: [id number]    Date of Admission: 2022  Date of Discharge:  2022  Primary Care Physician: Jonh Franco Jr., MD    Consults     Date and Time Order Name Status Description    2022 11:26 AM Inpatient Orthopedic Surgery Consult      2022  9:43 AM Inpatient Psychiatrist Consult Completed     2022  6:47 PM Inpatient Pulmonology Consult Completed     2022  1:40 PM IP General Consult (Use specialty-specific consult if known)            Active and Resolved Hospital Problems:  Hyperactive delirium  Minimally comminuted nondisplaced fracture of the greater tuberosity of the proximal humerus.  Minimally displaced fracture at the inferior glenoid.  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  Hepatic encephalopathy  ROMO cirrhosis  Pancytopenia likely related to cirrhosis  Insulin-dependent diabetes mellitus - blood sugars controlled  Obstructive sleep apnea not on home CPAP  History of traumatic brain injury  Mild hyperbilirubinemia  Major depression with suicidal ideation  Acute Respiratory Alkalosis , resolved  Thrombocytopenia   Dyslipidemia  Acute kidney injury secondary to prerenal etiology, resolved  Folliculitis    Hospital Course     Hospital Course:  Nic Nicolas is a 57 y.o. male presented with altered mental status subsequently treated for acute hepatic encephalopathy.  Psychiatry was consulted at 1 point due to suicidal ideation.  Patient's mentation slowly improved with treatment of hepatic encephalopathy Orthopedic was consulted due to a right shoulder x-ray revealing a comminuted nondisplaced fracture of the greater tuberosity of the proximal humerus and minimally displaced fracture of the inferior glenoid.  It was recommended patient continue with sling and have follow-up x-ray in 2 weeks.  's follow-up film was obtained in the hospital shows stable  fracture.  Patient will need to follow-up with orthopedic surgery as an outpatient.  Patient's prolonged hospitalization was in part due to attempted placement.  However, this was unable to be obtained given patient's insurance and is growing strength during hospitalization.  Therefore patient discharged home.  Patient's medications were filled on date of discharge.  Patient had been on increasing doses of pain meds in the hospital due to complaints of chronic pain, leg pain along with new right shoulder pain.  Review of records shows patient had been weaned off of MS Contin as an outpatient around 6 months ago.  Patient was weaned down on pain meds while inpatient.  Patient was sent with a short course of oral oxycodone.  10 mg, 6 tabs provided oxycodone.  Patient was instructed to slowly wean down using these pain meds, informed him he likely does not need to resume chronic pain meds due to the acute issues.  Patient was provided a sling for his right arm however was never observed using in the hospital, using his right arm with no noted limitations.  Patient seen on date of discharge, clinically and hemodynamically stable.  Patient provided concerning signs and symptoms prompting immediate medical attention, patient understanding and agreeable    DISCHARGE Follow Up Recommendations for labs and diagnostics:   Follow-up with PCP in less than 1 week  Follow-up with orthopedic surgery in 1 month.      Day of Discharge     Vital Signs:  Temp:  [97.7 °F (36.5 °C)-98.2 °F (36.8 °C)] 97.9 °F (36.6 °C)  Heart Rate:  [66-87] 66  Resp:  [18-20] 18  BP: (124-143)/(45-69) 132/69  Physical Exam:   Constitutional: Alert, awake,  sitting in bedside chair  HEENT:  PERRLA, EOMI; No Scleral icterus  Neck:  Supple; No Mass, No Lymphadenopathy  Cardiovascular:  No Rubs, No Edema, No JVD   Respiratory: No respiratory distress, unlabored breathing, saturating well on room air  Abdomen:  Normal BS all 4 Quadrants; No Guarding, No  Tenderness  Musculoskeletal:  Pulses Positive all 4 Ext; No Cyanosis, Edema present to bilateral lower extremities  Neurological: No facial asymmetry, answers questions, intermittently confused, moves all extremities  Psychiatry: No agitation, answering questions apparently  Skin: Bilateral lower extremities with dressing      Discharge Details        Discharge Medications      New Medications      Instructions Start Date   folic acid 1 MG tablet  Commonly known as: FOLVITE   1 mg, Oral, Daily   Start Date: June 18, 2022     oxyCODONE 10 MG tablet  Commonly known as: ROXICODONE   10 mg, Oral, Every 8 Hours PRN      thiamine 100 MG tablet  tablet  Commonly known as: VITAMIN B-1   100 mg, Oral, Daily   Start Date: June 18, 2022        Changes to Medications      Instructions Start Date   Diclofenac Sodium 1 % gel gel  Commonly known as: VOLTAREN  What changed: See the new instructions.   Apply to affected area as needed for pain      fluticasone 50 MCG/ACT nasal spray  Commonly known as: FLONASE  What changed: See the new instructions.   1 spray, Nasal, Daily      insulin detemir 100 UNIT/ML injection  Commonly known as: LEVEMIR  What changed: how much to take   10 Units, Subcutaneous, Nightly      sertraline 25 MG tablet  Commonly known as: ZOLOFT  What changed: See the new instructions.   25 mg, Oral, Daily      spironolactone 100 MG tablet  Commonly known as: ALDACTONE  What changed: when to take this   100 mg, Oral, Daily         Continue These Medications      Instructions Start Date   albuterol sulfate  (90 Base) MCG/ACT inhaler  Commonly known as: PROVENTIL HFA;VENTOLIN HFA;PROAIR HFA   2 puffs, Inhalation, Every 4 Hours PRN      atorvastatin 40 MG tablet  Commonly known as: LIPITOR   40 mg, Oral, Daily      busPIRone 7.5 MG tablet  Commonly known as: BUSPAR   7.5 mg, Oral, 3 Times Daily      fluticasone 50 MCG/BLIST diskus inhaler  Commonly known as: FLOVENT DISKUS   1 puff, Inhalation, 2 Times Daily -  RT      freestyle lancets   USE TO CHECK BLOOD SUGAR 5 TIMES PER DAY.      furosemide 40 MG tablet  Commonly known as: LASIX   40 mg, Oral, 2 Times Daily      insulin lispro 100 UNIT/ML injection  Commonly known as: humaLOG   0-7 Units, Subcutaneous, 3 Times Daily Before Meals      lactulose 10 GM/15ML solution  Commonly known as: CHRONULAC   20 g, Oral, 3 Times Daily      omeprazole 40 MG capsule  Commonly known as: priLOSEC   40 mg, Oral, 2 Times Daily      potassium chloride 20 MEQ CR tablet  Commonly known as: K-DUR,KLOR-CON   20 mEq, Oral, Daily      riFAXIMin 550 MG tablet  Commonly known as: XIFAXAN   550 mg, Oral, Every 12 Hours Scheduled         Stop These Medications    glucose blood test strip     ondansetron ODT 4 MG disintegrating tablet  Commonly known as: ZOFRAN-ODT            No Known Allergies    Discharge Disposition:  Home or Self Care    Diet:  Hospital:  Diet Order   Procedures   • Diet Soft Texture; Ground; Low Sodium, Consistent Carbohydrate       Discharge Activity:   Activity Instructions     Activity as Tolerated      Other Activity Instructions      Activity Instructions: Please keep right upper extremity in sling          CODE STATUS:  Code Status and Medical Interventions:   Ordered at: 05/24/22 1922     Code Status (Patient has no pulse and is not breathing):    CPR (Attempt to Resuscitate)     Medical Interventions (Patient has pulse or is breathing):    Full Support         Future Appointments   Date Time Provider Department Center   6/27/2022  1:45 PM Jonh Franco Jr., MD [facility]       Additional Instructions for the Follow-ups that You Need to Schedule     Discharge Follow-up with PCP   As directed       Currently Documented PCP:    Jonh Franco Jr., MD    PCP Phone Number:    [phone]     Follow Up Details: In less than one week         Discharge Follow-up with Specified Provider: Dr Ortega; 3 Weeks   As directed      To: Dr Ortega    Follow Up: 3  Weeks               Pertinent  and/or Most Recent Results     PROCEDURES:       LAB RESULTS:      Lab 06/15/22  0852 06/14/22  0544 06/11/22  0533   WBC 4.32 3.49 3.95   HEMOGLOBIN 9.7* 9.4* 9.5*   HEMATOCRIT 31.4* 28.1* 28.5*   PLATELETS 66* 57* 61*   NEUTROS ABS  --  2.15 2.75   IMMATURE GRANS (ABS)  --  0.01 0.02   LYMPHS ABS  --  0.73 0.66*   MONOS ABS  --  0.43 0.37   EOS ABS  --  0.15 0.12   .9* 97.9* 96.9         Lab 06/16/22  0508 06/15/22  0853 06/14/22  0544 06/12/22  0842 06/11/22  0533   SODIUM 131* 131* 131* 131* 133*   POTASSIUM 4.4 4.5 4.4 4.8 4.5   CHLORIDE 97* 98 98 97* 100   CO2 24.4 22.6 25.5 26.6 22.6   ANION GAP 9.6 10.4 7.5 7.4 10.4   BUN 19 21* 26* 22* 16   CREATININE 0.95 0.93 1.01 0.89 0.98   EGFR 93.9 96.4 87.3 100.6 90.5   GLUCOSE 194* 138* 221* 146* 231*   CALCIUM 9.0 9.0 9.0 8.9 8.7   MAGNESIUM 1.7 1.7 1.7 1.6 1.4*   PHOSPHORUS  --   --  3.5  --  4.0         Lab 06/15/22  0853 06/14/22  0544 06/12/22  0842 06/11/22  0533   TOTAL PROTEIN 6.5 6.4 6.4 6.3   ALBUMIN 3.20* 3.30* 3.30* 3.10*   GLOBULIN 3.3 3.1 3.1 3.2   ALT (SGPT) 30 29 29 32   AST (SGOT) 55* 50* 48* 48*   BILIRUBIN 1.1 0.9 1.1 0.9   ALK PHOS 111 118* 113 131*                     Brief Urine Lab Results  (Last result in the past 365 days)      Color   Clarity   Blood   Leuk Est   Nitrite   Protein   CREAT   Urine HCG        05/24/22 1158 Dark Yellow   Clear   Negative   Negative   Negative   Negative               Microbiology Results (last 10 days)     ** No results found for the last 240 hours. **          CT Abdomen Pelvis Without Contrast    Result Date: 5/24/2022  Impression:   1. No evidence for acute abnormality throughout the abdomen or pelvis on this noncontrast study. 2. A redundant gas-filled colon is noted.  There is no evidence for significant bowel dilatation or obstruction. 3. No evidence for nephrolithiasis bilaterally. There is no evidence for obstructive uropathy. 4. Changes of hepatic cirrhosis are  noted with associated splenomegaly.     ASHLEY HIDALGO MD       Electronically Signed and Approved By: ASHLEY HIDALGO MD on 5/24/2022 at 12:56             XR Shoulder 2+ View Right    Result Date: 6/16/2022  Impression:   1. Stable nondisplaced minimally comminuted fracture of the greater tuberosity of the proximal right humerus. 2. Stable nondisplaced fracture at the inferior lip of the glenoid rim.       HUGH FISCHER MD       Electronically Signed and Approved By: HUGH FISCHER MD on 6/16/2022 at 20:39             XR Shoulder 2+ View Right    Result Date: 6/1/2022  Impression:   1. Minimally comminuted nondisplaced fracture of the greater tuberosity of the proximal humerus. 2. Minimally displaced fracture at the inferior glenoid. 3. 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      COOPER JO MD       Electronically Signed and Approved By: COOPER JO MD on 6/01/2022 at 14:51             CT Head Without Contrast    Result Date: 5/24/2022  Impression:   1. No acute intracranial abnormality     Fili Jones M.D.       Electronically Signed and Approved By: Fili Jones M.D. on 5/24/2022 at 12:54             XR Chest 1 View    Result Date: 5/24/2022  Impression:  No acute disease.  No significant change has occurred.        RACHELL DEMPSEY MD       Electronically Signed and Approved By: RACHELL DEMPSEY MD on 5/24/2022 at 12:19               Results for orders placed during the hospital encounter of 02/24/22    Duplex Carotid Ultrasound CAR    Interpretation Summary  · No significant stenosis is present in carotid bifurcations bilaterally.  · There are right mid internal carotid artery velocity elevations that would meet criteria for mild to moderate stenosis, however, this appears to be related to tortuosity in this region.  · No significant plaque in the bifurcations bilaterally.  · Vertebral flow is antegrade bilaterally.      Results for orders placed during the hospital encounter of 02/24/22    Duplex Carotid  Ultrasound CAR    Interpretation Summary  · No significant stenosis is present in carotid bifurcations bilaterally.  · There are right mid internal carotid artery velocity elevations that would meet criteria for mild to moderate stenosis, however, this appears to be related to tortuosity in this region.  · No significant plaque in the bifurcations bilaterally.  · Vertebral flow is antegrade bilaterally.      Results for orders placed during the hospital encounter of 01/19/22    Adult Transthoracic Echo Complete W/ Cont if Necessary Per Protocol (With Agitated Saline)    Interpretation Summary  · Saline test results are negative.  · Estimated left ventricular EF = 59% Left ventricular systolic function is normal.  · Left ventricular diastolic function was normal.      Labs Pending at Discharge:        Time spent on Discharge including face to face service:  40 minutes    Electronically signed by Odell Soliz MD, 06/17/22, 9:39 AM EDT.

## 2022-06-17 NOTE — PAYOR COMM NOTE
"Parish Wright (57 y.o. Male)             Date of Birth   1965    Social Security Number       Address   [address] [place] [address]    Home Phone   [phone]    MRN   [id number]       [employer]   [employer]    Marital Status                               Admission Date   5/24/22    Admission Type   Emergency    Admitting Provider   Odell Soliz MD    Attending Provider   Odell Soliz MD    Department, Room/Bed   [address], 4001/1       Discharge Date       Discharge Disposition   Home or Self Care    Discharge Destination                               Attending Provider: Odell Soliz MD    Allergies: No Known Allergies    Isolation: None   Infection: None   Code Status: CPR   Advance Care Planning Activity    Ht: 182.9 cm (72\")   Wt: 106 kg (233 lb 11 oz)    Admission Cmt: None   Principal Problem: None                Active Insurance as of 5/24/2022     Primary Coverage     Payor Plan Insurance Group Employer/Plan Group    [facility] BY SUZANNE [id number]     Payor Plan Address Payor Plan Phone Number Payor Plan Fax Number Effective Dates    [address]   5/1/2022 - None Entered    [place] [address]       Subscriber Name Subscriber Birth Date Member ID       PARISH WRIGHT 1965 [id number]                 Emergency Contacts      (Rel.) Home Phone Work Phone Mobile Phone    DG MURPHY (Sister) -- -- [phone]    Christo Soria (Friend) [phone] -- --    Nandini Wright (Daughter) -- -- [phone]        #[id number] HAS DC ORDERS FOR TODAY      CONTACT   LONG S UTILIZATION REVIEW    [facility]  [address] [place] [address]  TAX ID [id number]  NPI  [id number]       [phone]   [phone]       Discharge Summary      Odell Soliz MD at 06/17/22 0922                         [facility]    Patient Name: Parish" Jackson Nicolas  : 1965  MRN: [id number]    Date of Admission: 2022  Date of Discharge:  2022  Primary Care Physician: Jonh Franco Jr., MD    Consults     Date and Time Order Name Status Description    2022 11:26 AM Inpatient Orthopedic Surgery Consult      2022  9:43 AM Inpatient Psychiatrist Consult Completed     2022  6:47 PM Inpatient Pulmonology Consult Completed     2022  1:40 PM IP General Consult (Use specialty-specific consult if known)            Active and Resolved Hospital Problems:  Hyperactive delirium  Minimally comminuted nondisplaced fracture of the greater tuberosity of the proximal humerus.  Minimally displaced fracture at the inferior glenoid.  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  Hepatic encephalopathy  ROMO cirrhosis  Pancytopenia likely related to cirrhosis  Insulin-dependent diabetes mellitus - blood sugars controlled  Obstructive sleep apnea not on home CPAP  History of traumatic brain injury  Mild hyperbilirubinemia  Major depression with suicidal ideation  Acute Respiratory Alkalosis , resolved  Thrombocytopenia   Dyslipidemia  Acute kidney injury secondary to prerenal etiology, resolved  Folliculitis    Hospital Course     Hospital Course:  Nic Nicolas is a 57 y.o. male presented with altered mental status subsequently treated for acute hepatic encephalopathy.  Psychiatry was consulted at 1 point due to suicidal ideation.  Patient's mentation slowly improved with treatment of hepatic encephalopathy Orthopedic was consulted due to a right shoulder x-ray revealing a comminuted nondisplaced fracture of the greater tuberosity of the proximal humerus and minimally displaced fracture of the inferior glenoid.  It was recommended patient continue with sling and have follow-up x-ray in 2 weeks.  's follow-up film was obtained in the hospital shows stable fracture.  Patient will need to follow-up with orthopedic surgery as an outpatient.   Patient's prolonged hospitalization was in part due to attempted placement.  However, this was unable to be obtained given patient's insurance and is growing strength during hospitalization.  Therefore patient discharged home.  Patient's medications were filled on date of discharge.  Patient had been on increasing doses of pain meds in the hospital due to complaints of chronic pain, leg pain along with new right shoulder pain.  Review of records shows patient had been weaned off of MS Contin as an outpatient around 6 months ago.  Patient was weaned down on pain meds while inpatient.  Patient was sent with a short course of oral oxycodone.  10 mg, 6 tabs provided oxycodone.  Patient was instructed to slowly wean down using these pain meds, informed him he likely does not need to resume chronic pain meds due to the acute issues.  Patient was provided a sling for his right arm however was never observed using in the hospital, using his right arm with no noted limitations.  Patient seen on date of discharge, clinically and hemodynamically stable.  Patient provided concerning signs and symptoms prompting immediate medical attention, patient understanding and agreeable    DISCHARGE Follow Up Recommendations for labs and diagnostics:   Follow-up with PCP in less than 1 week  Follow-up with orthopedic surgery in 1 month.      Day of Discharge     Vital Signs:  Temp:  [97.7 °F (36.5 °C)-98.2 °F (36.8 °C)] 97.9 °F (36.6 °C)  Heart Rate:  [66-87] 66  Resp:  [18-20] 18  BP: (124-143)/(45-69) 132/69  Physical Exam:   Constitutional: Alert, awake,  sitting in bedside chair  HEENT:  PERRLA, EOMI; No Scleral icterus  Neck:  Supple; No Mass, No Lymphadenopathy  Cardiovascular:  No Rubs, No Edema, No JVD   Respiratory: No respiratory distress, unlabored breathing, saturating well on room air  Abdomen:  Normal BS all 4 Quadrants; No Guarding, No Tenderness  Musculoskeletal:  Pulses Positive all 4 Ext; No Cyanosis, Edema present to  bilateral lower extremities  Neurological: No facial asymmetry, answers questions, intermittently confused, moves all extremities  Psychiatry: No agitation, answering questions apparently  Skin: Bilateral lower extremities with dressing      Discharge Details        Discharge Medications      New Medications      Instructions Start Date   folic acid 1 MG tablet  Commonly known as: FOLVITE   1 mg, Oral, Daily   Start Date: June 18, 2022     oxyCODONE 10 MG tablet  Commonly known as: ROXICODONE   10 mg, Oral, Every 8 Hours PRN      thiamine 100 MG tablet  tablet  Commonly known as: VITAMIN B-1   100 mg, Oral, Daily   Start Date: June 18, 2022        Changes to Medications      Instructions Start Date   Diclofenac Sodium 1 % gel gel  Commonly known as: VOLTAREN  What changed: See the new instructions.   Apply to affected area as needed for pain      fluticasone 50 MCG/ACT nasal spray  Commonly known as: FLONASE  What changed: See the new instructions.   1 spray, Nasal, Daily      insulin detemir 100 UNIT/ML injection  Commonly known as: LEVEMIR  What changed: how much to take   10 Units, Subcutaneous, Nightly      sertraline 25 MG tablet  Commonly known as: ZOLOFT  What changed: See the new instructions.   25 mg, Oral, Daily      spironolactone 100 MG tablet  Commonly known as: ALDACTONE  What changed: when to take this   100 mg, Oral, Daily         Continue These Medications      Instructions Start Date   albuterol sulfate  (90 Base) MCG/ACT inhaler  Commonly known as: PROVENTIL HFA;VENTOLIN HFA;PROAIR HFA   2 puffs, Inhalation, Every 4 Hours PRN      atorvastatin 40 MG tablet  Commonly known as: LIPITOR   40 mg, Oral, Daily      busPIRone 7.5 MG tablet  Commonly known as: BUSPAR   7.5 mg, Oral, 3 Times Daily      fluticasone 50 MCG/BLIST diskus inhaler  Commonly known as: FLOVENT DISKUS   1 puff, Inhalation, 2 Times Daily - RT      freestyle lancets   USE TO CHECK BLOOD SUGAR 5 TIMES PER DAY.      furosemide  40 MG tablet  Commonly known as: LASIX   40 mg, Oral, 2 Times Daily      insulin lispro 100 UNIT/ML injection  Commonly known as: humaLOG   0-7 Units, Subcutaneous, 3 Times Daily Before Meals      lactulose 10 GM/15ML solution  Commonly known as: CHRONULAC   20 g, Oral, 3 Times Daily      omeprazole 40 MG capsule  Commonly known as: priLOSEC   40 mg, Oral, 2 Times Daily      potassium chloride 20 MEQ CR tablet  Commonly known as: K-DUR,KLOR-CON   20 mEq, Oral, Daily      riFAXIMin 550 MG tablet  Commonly known as: XIFAXAN   550 mg, Oral, Every 12 Hours Scheduled         Stop These Medications    glucose blood test strip     ondansetron ODT 4 MG disintegrating tablet  Commonly known as: ZOFRAN-ODT            No Known Allergies    Discharge Disposition:  Home or Self Care    Diet:  Hospital:  Diet Order   Procedures   • Diet Soft Texture; Ground; Low Sodium, Consistent Carbohydrate       Discharge Activity:   Activity Instructions     Activity as Tolerated      Other Activity Instructions      Activity Instructions: Please keep right upper extremity in sling          CODE STATUS:  Code Status and Medical Interventions:   Ordered at: 05/24/22 1922     Code Status (Patient has no pulse and is not breathing):    CPR (Attempt to Resuscitate)     Medical Interventions (Patient has pulse or is breathing):    Full Support         Future Appointments   Date Time Provider Department Center   6/27/2022  1:45 PM Jonh Franco Jr., MD [facility]       Additional Instructions for the Follow-ups that You Need to Schedule     Discharge Follow-up with PCP   As directed       Currently Documented PCP:    Jonh Franco Jr., MD    PCP Phone Number:    [phone]     Follow Up Details: In less than one week         Discharge Follow-up with Specified Provider: Dr Ortega; 3 Weeks   As directed      To: Dr Ortega    Follow Up: 3 Weeks               Pertinent  and/or Most Recent Results     PROCEDURES:       LAB  RESULTS:      Lab 06/15/22  0852 06/14/22  0544 06/11/22  0533   WBC 4.32 3.49 3.95   HEMOGLOBIN 9.7* 9.4* 9.5*   HEMATOCRIT 31.4* 28.1* 28.5*   PLATELETS 66* 57* 61*   NEUTROS ABS  --  2.15 2.75   IMMATURE GRANS (ABS)  --  0.01 0.02   LYMPHS ABS  --  0.73 0.66*   MONOS ABS  --  0.43 0.37   EOS ABS  --  0.15 0.12   .9* 97.9* 96.9         Lab 06/16/22  0508 06/15/22  0853 06/14/22  0544 06/12/22  0842 06/11/22  0533   SODIUM 131* 131* 131* 131* 133*   POTASSIUM 4.4 4.5 4.4 4.8 4.5   CHLORIDE 97* 98 98 97* 100   CO2 24.4 22.6 25.5 26.6 22.6   ANION GAP 9.6 10.4 7.5 7.4 10.4   BUN 19 21* 26* 22* 16   CREATININE 0.95 0.93 1.01 0.89 0.98   EGFR 93.9 96.4 87.3 100.6 90.5   GLUCOSE 194* 138* 221* 146* 231*   CALCIUM 9.0 9.0 9.0 8.9 8.7   MAGNESIUM 1.7 1.7 1.7 1.6 1.4*   PHOSPHORUS  --   --  3.5  --  4.0         Lab 06/15/22  0853 06/14/22  0544 06/12/22  0842 06/11/22  0533   TOTAL PROTEIN 6.5 6.4 6.4 6.3   ALBUMIN 3.20* 3.30* 3.30* 3.10*   GLOBULIN 3.3 3.1 3.1 3.2   ALT (SGPT) 30 29 29 32   AST (SGOT) 55* 50* 48* 48*   BILIRUBIN 1.1 0.9 1.1 0.9   ALK PHOS 111 118* 113 131*                     Brief Urine Lab Results  (Last result in the past 365 days)      Color   Clarity   Blood   Leuk Est   Nitrite   Protein   CREAT   Urine HCG        05/24/22 1158 Dark Yellow   Clear   Negative   Negative   Negative   Negative               Microbiology Results (last 10 days)     ** No results found for the last 240 hours. **          CT Abdomen Pelvis Without Contrast    Result Date: 5/24/2022  Impression:   1. No evidence for acute abnormality throughout the abdomen or pelvis on this noncontrast study. 2. A redundant gas-filled colon is noted.  There is no evidence for significant bowel dilatation or obstruction. 3. No evidence for nephrolithiasis bilaterally. There is no evidence for obstructive uropathy. 4. Changes of hepatic cirrhosis are noted with associated splenomegaly.     ASHLEY HIDALGO MD       Electronically Signed  and Approved By: ASHLEY HIDALGO MD on 5/24/2022 at 12:56             XR Shoulder 2+ View Right    Result Date: 6/16/2022  Impression:   1. Stable nondisplaced minimally comminuted fracture of the greater tuberosity of the proximal right humerus. 2. Stable nondisplaced fracture at the inferior lip of the glenoid rim.       HUGH FISCHER MD       Electronically Signed and Approved By: HUGH FISCHER MD on 6/16/2022 at 20:39             XR Shoulder 2+ View Right    Result Date: 6/1/2022  Impression:   1. Minimally comminuted nondisplaced fracture of the greater tuberosity of the proximal humerus. 2. Minimally displaced fracture at the inferior glenoid. 3. 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      COOPER JO MD       Electronically Signed and Approved By: COOPER JO MD on 6/01/2022 at 14:51             CT Head Without Contrast    Result Date: 5/24/2022  Impression:   1. No acute intracranial abnormality     Fili Jones M.D.       Electronically Signed and Approved By: Fili Jones M.D. on 5/24/2022 at 12:54             XR Chest 1 View    Result Date: 5/24/2022  Impression:  No acute disease.  No significant change has occurred.        RACHELL DEMPSEY MD       Electronically Signed and Approved By: RACHELL DEMPSEY MD on 5/24/2022 at 12:19               Results for orders placed during the hospital encounter of 02/24/22    Duplex Carotid Ultrasound CAR    Interpretation Summary  · No significant stenosis is present in carotid bifurcations bilaterally.  · There are right mid internal carotid artery velocity elevations that would meet criteria for mild to moderate stenosis, however, this appears to be related to tortuosity in this region.  · No significant plaque in the bifurcations bilaterally.  · Vertebral flow is antegrade bilaterally.      Results for orders placed during the hospital encounter of 02/24/22    Duplex Carotid Ultrasound CAR    Interpretation Summary  · No significant stenosis is present in carotid  bifurcations bilaterally.  · There are right mid internal carotid artery velocity elevations that would meet criteria for mild to moderate stenosis, however, this appears to be related to tortuosity in this region.  · No significant plaque in the bifurcations bilaterally.  · Vertebral flow is antegrade bilaterally.      Results for orders placed during the hospital encounter of 01/19/22    Adult Transthoracic Echo Complete W/ Cont if Necessary Per Protocol (With Agitated Saline)    Interpretation Summary  · Saline test results are negative.  · Estimated left ventricular EF = 59% Left ventricular systolic function is normal.  · Left ventricular diastolic function was normal.      Labs Pending at Discharge:        Time spent on Discharge including face to face service:  40 minutes    Electronically signed by Odell Soliz MD, 06/17/22, 9:39 AM EDT.        Electronically signed by Odell Soliz MD at 06/17/22 1365

## 2022-06-17 NOTE — PLAN OF CARE
Goal Outcome Evaluation:  Plan of Care Reviewed With: patient        Progress: improving  Outcome Evaluation: pt aox4. VS stable. pt reports right shoulder pain much improved. continues to use right arm even in sling. pt educated on keeping arm still in sling to allow healing. remains with trace edema to BLE, refusing wraps at this time. ready to discharge home

## 2022-06-17 NOTE — DISCHARGE INSTR - APPOINTMENTS
Follow up with  Dr. Landis at [facility] office on Monday 06/20/22 at 11:15 a.m. make sure to be there 15 min early and wear a mask.    Follow up with Dr. Ortega on Friday 07/08/22 at 1:30 p.m.

## 2022-06-17 NOTE — OUTREACH NOTE
Prep Survey    Flowsheet Row Responses   [facility] patient discharged from? [facility]   Is LACE score < 7 ? No   Emergency Room discharge w/ pulse ox? No   Eligibility [facility]   [facility]   Date of Admission 05/24/22   Date of Discharge 06/17/22   Discharge Disposition Home or Self Care   Discharge diagnosis hepatic encephalopathy, ROMO cirrhosis, nondisplaced humerus fracture, IVELISSE, IDDM, Major depression with suicidal ideation   Does the patient have one of the following disease processes/diagnoses(primary or secondary)? Other   Does the patient have Home health ordered? No   Is there a DME ordered? No   Prep survey completed? Yes          TRISH SORTO - Registered Nurse

## 2022-06-17 NOTE — PLAN OF CARE
Goal Outcome Evaluation:  Plan of Care Reviewed With: patient        Progress: no change  Outcome Evaluation: PRN pain med given overnight per patient request. Has not asked for more than he is ordered. Says he had at least 8 bowel movements since the HS lactulose dose was given. Did not want legs wrapped overnight, but did sleep in bed with legs elevated

## 2022-06-19 PROBLEM — K75.81 LIVER CIRRHOSIS SECONDARY TO NASH (NONALCOHOLIC STEATOHEPATITIS): Status: ACTIVE | Noted: 2021-11-02

## 2022-06-20 ENCOUNTER — OFFICE VISIT (OUTPATIENT)
Dept: INTERNAL MEDICINE | Facility: CLINIC | Age: 57
End: 2022-06-20

## 2022-06-20 ENCOUNTER — TELEPHONE (OUTPATIENT)
Dept: CASE MANAGEMENT | Facility: OTHER | Age: 57
End: 2022-06-20

## 2022-06-20 ENCOUNTER — TRANSITIONAL CARE MANAGEMENT TELEPHONE ENCOUNTER (OUTPATIENT)
Dept: CALL CENTER | Facility: HOSPITAL | Age: 57
End: 2022-06-20

## 2022-06-20 VITALS
BODY MASS INDEX: 40.41 KG/M2 | HEIGHT: 68 IN | SYSTOLIC BLOOD PRESSURE: 134 MMHG | WEIGHT: 266.6 LBS | OXYGEN SATURATION: 97 % | TEMPERATURE: 97.8 F | DIASTOLIC BLOOD PRESSURE: 61 MMHG | HEART RATE: 99 BPM

## 2022-06-20 DIAGNOSIS — E83.42 HYPOMAGNESEMIA: ICD-10-CM

## 2022-06-20 DIAGNOSIS — F15.10 AMPHETAMINE ABUSE: ICD-10-CM

## 2022-06-20 DIAGNOSIS — Z86.59 HISTORY OF SUICIDAL IDEATION: ICD-10-CM

## 2022-06-20 DIAGNOSIS — J30.89 NON-SEASONAL ALLERGIC RHINITIS DUE TO OTHER ALLERGIC TRIGGER: ICD-10-CM

## 2022-06-20 DIAGNOSIS — K74.60 LIVER CIRRHOSIS SECONDARY TO NASH (NONALCOHOLIC STEATOHEPATITIS): ICD-10-CM

## 2022-06-20 DIAGNOSIS — D61.818 PANCYTOPENIA: ICD-10-CM

## 2022-06-20 DIAGNOSIS — R29.6 FALLS FREQUENTLY: ICD-10-CM

## 2022-06-20 DIAGNOSIS — K76.82 HEPATIC ENCEPHALOPATHY: Primary | ICD-10-CM

## 2022-06-20 DIAGNOSIS — R29.6 FREQUENT FALLS: Primary | ICD-10-CM

## 2022-06-20 DIAGNOSIS — K75.81 LIVER CIRRHOSIS SECONDARY TO NASH (NONALCOHOLIC STEATOHEPATITIS): ICD-10-CM

## 2022-06-20 DIAGNOSIS — S42.201D CLOSED FRACTURE OF PROXIMAL END OF RIGHT HUMERUS WITH ROUTINE HEALING, UNSPECIFIED FRACTURE MORPHOLOGY, SUBSEQUENT ENCOUNTER: ICD-10-CM

## 2022-06-20 DIAGNOSIS — E87.1 HYPONATREMIA: ICD-10-CM

## 2022-06-20 DIAGNOSIS — E11.43 TYPE 2 DIABETES MELLITUS WITH DIABETIC AUTONOMIC NEUROPATHY, WITH LONG-TERM CURRENT USE OF INSULIN: ICD-10-CM

## 2022-06-20 DIAGNOSIS — Z79.4 TYPE 2 DIABETES MELLITUS WITH DIABETIC AUTONOMIC NEUROPATHY, WITH LONG-TERM CURRENT USE OF INSULIN: ICD-10-CM

## 2022-06-20 LAB
ALBUMIN SERPL-MCNC: 3.7 G/DL (ref 3.5–5.2)
ALBUMIN/GLOB SERPL: 1.1 G/DL
ALP SERPL-CCNC: 144 U/L (ref 39–117)
ALT SERPL W P-5'-P-CCNC: 35 U/L (ref 1–41)
ANION GAP SERPL CALCULATED.3IONS-SCNC: 11.2 MMOL/L (ref 5–15)
AST SERPL-CCNC: 65 U/L (ref 1–40)
BASOPHILS # BLD AUTO: 0.05 10*3/MM3 (ref 0–0.2)
BASOPHILS NFR BLD AUTO: 1.2 % (ref 0–1.5)
BILIRUB SERPL-MCNC: 1.2 MG/DL (ref 0–1.2)
BUN SERPL-MCNC: 26 MG/DL (ref 6–20)
BUN/CREAT SERPL: 22 (ref 7–25)
CALCIUM SPEC-SCNC: 9 MG/DL (ref 8.6–10.5)
CHLORIDE SERPL-SCNC: 98 MMOL/L (ref 98–107)
CO2 SERPL-SCNC: 24.8 MMOL/L (ref 22–29)
CREAT SERPL-MCNC: 1.18 MG/DL (ref 0.76–1.27)
DEPRECATED RDW RBC AUTO: 50.5 FL (ref 37–54)
EGFRCR SERPLBLD CKD-EPI 2021: 72.4 ML/MIN/1.73
EOSINOPHIL # BLD AUTO: 0.25 10*3/MM3 (ref 0–0.4)
EOSINOPHIL NFR BLD AUTO: 6.1 % (ref 0.3–6.2)
ERYTHROCYTE [DISTWIDTH] IN BLOOD BY AUTOMATED COUNT: 14 % (ref 12.3–15.4)
GLOBULIN UR ELPH-MCNC: 3.3 GM/DL
GLUCOSE SERPL-MCNC: 156 MG/DL (ref 65–99)
HBV SURFACE AB SER RIA-ACNC: NORMAL
HBV SURFACE AG SERPL QL IA: NORMAL
HCT VFR BLD AUTO: 31.9 % (ref 37.5–51)
HGB BLD-MCNC: 10.3 G/DL (ref 13–17.7)
IMM GRANULOCYTES # BLD AUTO: 0.01 10*3/MM3 (ref 0–0.05)
IMM GRANULOCYTES NFR BLD AUTO: 0.2 % (ref 0–0.5)
INR PPP: 1.79 (ref 0.86–1.15)
LYMPHOCYTES # BLD AUTO: 0.7 10*3/MM3 (ref 0.7–3.1)
LYMPHOCYTES NFR BLD AUTO: 17.1 % (ref 19.6–45.3)
MAGNESIUM SERPL-MCNC: 2 MG/DL (ref 1.6–2.6)
MCH RBC QN AUTO: 32 PG (ref 26.6–33)
MCHC RBC AUTO-ENTMCNC: 32.3 G/DL (ref 31.5–35.7)
MCV RBC AUTO: 99.1 FL (ref 79–97)
MONOCYTES # BLD AUTO: 0.51 10*3/MM3 (ref 0.1–0.9)
MONOCYTES NFR BLD AUTO: 12.5 % (ref 5–12)
NEUTROPHILS NFR BLD AUTO: 2.57 10*3/MM3 (ref 1.7–7)
NEUTROPHILS NFR BLD AUTO: 62.9 % (ref 42.7–76)
NRBC BLD AUTO-RTO: 0 /100 WBC (ref 0–0.2)
PLATELET # BLD AUTO: 82 10*3/MM3 (ref 140–450)
PMV BLD AUTO: 10.4 FL (ref 6–12)
POTASSIUM SERPL-SCNC: 4.6 MMOL/L (ref 3.5–5.2)
PROT SERPL-MCNC: 7 G/DL (ref 6–8.5)
PROTHROMBIN TIME: 21.1 SECONDS (ref 11.8–14.9)
RBC # BLD AUTO: 3.22 10*6/MM3 (ref 4.14–5.8)
SODIUM SERPL-SCNC: 134 MMOL/L (ref 136–145)
TSH SERPL DL<=0.05 MIU/L-ACNC: 6.84 UIU/ML (ref 0.27–4.2)
WBC NRBC COR # BLD: 4.09 10*3/MM3 (ref 3.4–10.8)

## 2022-06-20 PROCEDURE — 1111F DSCHRG MED/CURRENT MED MERGE: CPT | Performed by: STUDENT IN AN ORGANIZED HEALTH CARE EDUCATION/TRAINING PROGRAM

## 2022-06-20 PROCEDURE — 82105 ALPHA-FETOPROTEIN SERUM: CPT | Performed by: STUDENT IN AN ORGANIZED HEALTH CARE EDUCATION/TRAINING PROGRAM

## 2022-06-20 PROCEDURE — 87340 HEPATITIS B SURFACE AG IA: CPT | Performed by: STUDENT IN AN ORGANIZED HEALTH CARE EDUCATION/TRAINING PROGRAM

## 2022-06-20 PROCEDURE — 85025 COMPLETE CBC W/AUTO DIFF WBC: CPT | Performed by: STUDENT IN AN ORGANIZED HEALTH CARE EDUCATION/TRAINING PROGRAM

## 2022-06-20 PROCEDURE — 86708 HEPATITIS A ANTIBODY: CPT | Performed by: STUDENT IN AN ORGANIZED HEALTH CARE EDUCATION/TRAINING PROGRAM

## 2022-06-20 PROCEDURE — 99214 OFFICE O/P EST MOD 30 MIN: CPT | Performed by: STUDENT IN AN ORGANIZED HEALTH CARE EDUCATION/TRAINING PROGRAM

## 2022-06-20 PROCEDURE — 85610 PROTHROMBIN TIME: CPT | Performed by: STUDENT IN AN ORGANIZED HEALTH CARE EDUCATION/TRAINING PROGRAM

## 2022-06-20 PROCEDURE — 84443 ASSAY THYROID STIM HORMONE: CPT | Performed by: STUDENT IN AN ORGANIZED HEALTH CARE EDUCATION/TRAINING PROGRAM

## 2022-06-20 PROCEDURE — 86704 HEP B CORE ANTIBODY TOTAL: CPT | Performed by: STUDENT IN AN ORGANIZED HEALTH CARE EDUCATION/TRAINING PROGRAM

## 2022-06-20 PROCEDURE — 80053 COMPREHEN METABOLIC PANEL: CPT | Performed by: STUDENT IN AN ORGANIZED HEALTH CARE EDUCATION/TRAINING PROGRAM

## 2022-06-20 PROCEDURE — 86706 HEP B SURFACE ANTIBODY: CPT | Performed by: STUDENT IN AN ORGANIZED HEALTH CARE EDUCATION/TRAINING PROGRAM

## 2022-06-20 PROCEDURE — 83735 ASSAY OF MAGNESIUM: CPT | Performed by: STUDENT IN AN ORGANIZED HEALTH CARE EDUCATION/TRAINING PROGRAM

## 2022-06-20 RX ORDER — CETIRIZINE HYDROCHLORIDE 10 MG/1
10 TABLET ORAL DAILY
Qty: 90 TABLET | Refills: 3 | Status: SHIPPED | OUTPATIENT
Start: 2022-06-20 | End: 2023-01-20 | Stop reason: SDUPTHER

## 2022-06-20 RX ORDER — SPIRONOLACTONE 100 MG/1
200 TABLET, FILM COATED ORAL DAILY
Qty: 180 TABLET | Refills: 2 | Status: SHIPPED | OUTPATIENT
Start: 2022-06-20 | End: 2022-11-07 | Stop reason: HOSPADM

## 2022-06-20 RX ORDER — LACTULOSE 10 G/15ML
SOLUTION ORAL; RECTAL
COMMUNITY
Start: 2022-05-25 | End: 2022-06-27

## 2022-06-20 NOTE — OUTREACH NOTE
Call Center TCM Note    Flowsheet Row Responses   [facility] [place] patient discharged from? [facility]   Does the patient have one of the following disease processes/diagnoses(primary or secondary)? Other   TCM attempt successful? Yes   Call start time 0944   Call end time 0947   Discharge diagnosis hepatic encephalopathy, ROMO cirrhosis, nondisplaced humerus fracture, IVELISSE, IDDM, Major depression with suicidal ideation   Does the patient have all medications ordered at discharge? Yes   Is the patient taking all medications as directed (includes completed medication regime)? Yes   Does the patient have a primary care provider?  Yes   Does the patient have an appointment with their PCP within 7 days of discharge? Yes   Comments regarding PCP hospital f/u with PCP on 6/20   Has the patient kept scheduled appointments due by today? N/A   Has home health visited the patient within 72 hours of discharge? N/A   Psychosocial issues? No   Did the patient receive a copy of their discharge instructions? Yes   What is the patient's perception of their health status since discharge? Worsening  [bad cramp to his groin area, going to see PCP this morning for appt]   Is the patient/caregiver able to teach back the hierarchy of who to call/visit for symptoms/problems? PCP, Specialist, Home health nurse, Urgent Care, ED, 911 Yes   TCM call completed? Yes   Wrap up additional comments Says he is in alot of pain and has cramps in his groin area, confirmed appt with PCP for today.          Adriane Almaguer RN    6/20/2022, 09:47 EDT

## 2022-06-20 NOTE — PROGRESS NOTES
Transitional Care Follow Up Visit  Subjective     Nic Nicolas is a 57 y.o. male who presents for a transitional care management visit.    Within 48 business hours after discharge our office contacted him via telephone to coordinate his care and needs.      I reviewed and discussed the details of that call along with the discharge summary, hospital problems, inpatient lab results, inpatient diagnostic studies, and consultation reports with Nic.     Current outpatient and discharge medications have been reconciled for the patient.  Reviewed by: Marquis Landis MD      Date of TCM Phone Call 6/17/2022   [facility]   Date of Admission 5/24/2022   Date of Discharge 6/17/2022   Discharge Disposition Home or Self Care     Risk for Readmission (LACE) Score: 18 (6/17/2022  6:01 AM)      History of Present Illness   Course During Hospital Stay:  Admitted 5/24/2022 to 6/17/2022 with hepatic encephalopathy, right humerus fracture as well as suicidal ideation.    Here today with his sister.    I was alerted by Los Davies that Mr. Nicolas was reportedly living in a hotel, and I discussed with him today.  He reports he lives in an apartment at [address] [place] [address].  He lives alone, but says a landlord checks in on him sometimes.  His sister says that living together is not an option.    Mr Nicolas is unable to name the medicines he is taking, though he reports he is taking the oral meds in blister packs that were prescribed by his PCP instead of the ones he was discharged with.  In addition, he is taking the short and long-acting insulin he was sent home with.  He is unable to explain what doses he is giving himself, and he reports he has not been using the glucometer he was sent home with as he didn't know how to use it.  He reports that over the weekend, he administered both insulins at one sitting, and felt diaphoretic.  He ate something, and felt improved.    He denies SI.  He is alert and oriented to person,  "place and time, if tangential in conversation today.    He denies use of NSAIDs or alcohol.  He is quite insistent at clinic today as he asks for opiates for pain today.  After discussion with him and his sister about the dangers of opiates, he reported that my refusal will force him to buy drugs on the street.    He reports he is compliant with his lactulose, and stooling at least 5 times a day.    He reports that his legs are growing more swollen.  He weighs 266 lbs today, which is up from 233 lbs on 5/12/22.  He reports he is compliant with his lasix and spironolactone, and that he took his morning meds.  The following portions of the patient's history were reviewed and updated as appropriate: allergies, current medications, past family history, past medical history, past social history, past surgical history and problem list.    Review of Systems  /61 (BP Location: Left arm, Patient Position: Sitting, Cuff Size: Large Adult)   Pulse 99   Temp 97.8 °F (36.6 °C) (Temporal)   Ht 172.7 cm (68\")   Wt 121 kg (266 lb 9.6 oz)   SpO2 97%   BMI 40.54 kg/m²     Objective   Physical Exam  Vitals reviewed.   Constitutional:       Appearance: Normal appearance. He is not ill-appearing.   HENT:      Head: Normocephalic and atraumatic.   Eyes:      Conjunctiva/sclera: Conjunctivae normal.   Cardiovascular:      Rate and Rhythm: Normal rate and regular rhythm.      Pulses: Normal pulses.      Heart sounds: Murmur heard.      Comments: II/VI systolic murmur, LLSB  Pulmonary:      Effort: Pulmonary effort is normal.      Breath sounds: Normal breath sounds. No wheezing.   Abdominal:      Comments: Abdomen distended.  No caput medusa.  Abdominal hernia noted.  He is diffusely TTP with no guarding or rebound   Musculoskeletal:      Right lower leg: Edema present.      Left lower leg: Edema present.      Comments: 2+ LE edema bilaterally   Skin:     General: Skin is warm and dry.   Neurological:      Mental Status: He is " alert and oriented to person, place, and time.      Comments: Asterixis noted   Psychiatric:         Speech: Speech is tangential.         Thought Content: Thought content does not include suicidal ideation.         Assessment & Plan   Diagnoses and all orders for this visit:    1. Hepatic encephalopathy (HCC) (Primary)  -     Ammonia    2. Liver cirrhosis secondary to ROMO (nonalcoholic steatohepatitis) (HCC)  -     Comprehensive Metabolic Panel  -     Protime-INR  -     Magnesium  -     Cancel: POC Urine Drug Screen [facility]  -     Ambulatory Referral to Gastroenterology  -     CBC & Differential  -     AFP Tumor Marker  -     Hepatitis B surface antigen  -     Hepatitis B surface antibody  -     Hepatitis B core antibody, total  -     Hepatitis A antibody, total  -     Ammonia  -     TSH  -     spironolactone (ALDACTONE) 100 MG tablet; Take 2 tablets by mouth Daily.  Dispense: 180 tablet; Refill: 2    3. Pancytopenia (HCC)  -     CBC & Differential    4. Closed fracture of proximal end of right humerus with routine healing, unspecified fracture morphology, subsequent encounter  -     Ambulatory Referral to Orthopedic Surgery    5. Type 2 diabetes mellitus with diabetic autonomic neuropathy, with long-term current use of insulin (HCC)    6. Hyponatremia  -     Comprehensive Metabolic Panel    7. Hypomagnesemia  -     Magnesium    8. Amphetamine abuse (HCC)  -     Cancel: POC Urine Drug Screen [facility]    9. History of suicidal ideation  -     Ambulatory Referral to Psychology  -     Ambulatory Referral to Psychiatry    10. Falls frequently  -     Ambulatory Referral to Physical Therapy Evaluate and treat    11. Non-seasonal allergic rhinitis due to other allergic trigger  -     cetirizine (zyrTEC) 10 MG tablet; Take 1 tablet by mouth Daily.  Dispense: 90 tablet; Refill: 3      Cirrhosis, HE:  -Child-Hannon B, MELD 19  -given poor memory and living alone, it is difficult to truly gauge medication  compliance  -with that said, it is extremely likely that insulin was inappropriately dosed  -Mr. Nicolas was given an additional 40 mg of PO lasix in clinic today  -In addition, I have increased lasix/spironolactone in tandem, given his lower extremity edema and pronounced weight gain  -I have placed a referral to re-establish with Dr. Roblero (among other things, he is due for repeat EGD)  -I counseled him on the importance of a high protein diet, as well as a night time (high protein, low carb) snack (snack is to prevent hypoglycemic episodes)  -Na restriction is < 2 gm  -hepatitis panel today; he may require hepatitis A and B immunizations  -he is s/p PCV20  -on lactulose for treatment of HE  -I counseled him to avoid NSAIDs  -I also spent quite a bit of time discussing these matters with his sister, and explaining cirrhosis (e.g., she thought cirrhosis meant he had liver cancer)  -patient reported today he was unable to provide urine for UDS; his reports that he will obtain street drugs noted above under history    T2DM:  -as Mr. Nicolas is unable to tell me how much insulin he is giving himself and given his cirrhosis, I am quite concerned that he had a hypogycemic episode after insulin administration  -I am discontinuing the sliding scale short acting, and I will cautiously continue the long-acting  -today he received education how to use a glucometer, and his sister was present for this training session as well  -I counseled Mr. Nicolas and his sister that blood sugars under 70 should be treated with 15 gm of carbs, and re-check of blood sugar in 15 minutes (repeat if still not at goal); if needing repeat boluses of carbs without improvement, would need to present at ED  -I counseled the two of them that blood sugars over 180 are elevated    History of Suicidal Ideation:  -denies SI today  -of note, Mr. Nicolas did report that as a result of my refusal to prescribe him opiates he will obtain them on the  streets    Return in about 1 week (around 6/27/2022) for Annual physical with Dr. Franco.

## 2022-06-20 NOTE — TELEPHONE ENCOUNTER
Per chart review the patient was Discharged from [facility] . The patient had follow up visit with PCP today . I spoke with PCP multiple times post visit . The PCP stated that the patient was living back in his apartment and was aconine by his sister. Stated no pain meds were prescribed during visit. Stated the patient stated he would purchase illegal drugs if he had too. PCP stated that APC may be a good idea . Stated that patient may not be taking meds correctly and in his oppion the patient was not able to care for himself.    [facility] was contacted for PT /OT and agred to take patient. Orders still open I will fax infor to  once signed  FAX # [phone]

## 2022-06-20 NOTE — PROGRESS NOTES
Lives in apartment.  [address]. MARCELLA.  Lives alone, but says his landlord checks on him sometimes.    Denies SI.

## 2022-06-21 ENCOUNTER — TELEPHONE (OUTPATIENT)
Dept: INTERNAL MEDICINE | Facility: CLINIC | Age: 57
End: 2022-06-21

## 2022-06-21 LAB
ALPHA-FETOPROTEIN: 2 NG/ML (ref 0–8.3)
HAV AB SER QL IA: NEGATIVE
HBV CORE AB SERPL QL IA: NEGATIVE

## 2022-06-21 RX ORDER — BUSPIRONE HYDROCHLORIDE 7.5 MG/1
TABLET ORAL
Qty: 90 TABLET | Refills: 1 | Status: SHIPPED | OUTPATIENT
Start: 2022-06-21 | End: 2022-08-19

## 2022-06-21 NOTE — TELEPHONE ENCOUNTER
----- Message from Marquis Landis MD sent at 6/21/2022  6:20 AM EDT -----  Overall, labs are mildly improved.    INR is still elevated but mildly improved.    CMP (which measures electrolytes, kidney function, and liver enzymes) is notable for blood sugar of 156, sodium 134 (which is improved compared to 5 days ago).  AST and alkaline phosphatase (liver enzymes) are elevated.  Magnesium level is within normal limits.    CBC is notable for mild improvement in anemia.  Platelet count is also mildly improved.    AFP (a screen for evidence of liver cancer) is within normal limits.    Hepatitis labs so far show no evidence of exposure or immunity to Hepatitis B.  I'm waiting for labs regarding Hepatitis A.  Mr. Nicolas will need immunization against Hepatitis B.

## 2022-06-21 NOTE — TELEPHONE ENCOUNTER
Caller: Nic Nicolas    Relationship to patient: Self    Best call back number: [phone]     Patient is needing: PATIENT CALLED TO GET RESULTS AND I READ THEM. HE HAS SOME QUESTIONS ABOUT HIS PNEUMONIA LEVELS. PLEASE CALL AND ADVISE.

## 2022-06-21 NOTE — TELEPHONE ENCOUNTER
ATTEMPTED TO CONTACT PT PER PROVIDER'S INSTRUCTIONS     NO ANSWER     LEFT VOICEMAIL WITH INSTRUCTIONS TO RETURN CALL TO OFFICE AT [phone]    OK FOR HUB TO ADVISE/READ   Marquis Landis MD   6/21/2022  6:20 AM EDT Back to Top      Overall, labs are mildly improved.     INR is still elevated but mildly improved.     CMP (which measures electrolytes, kidney function, and liver enzymes) is notable for blood sugar of 156, sodium 134 (which is improved compared to 5 days ago).  AST and alkaline phosphatase (liver enzymes) are elevated.  Magnesium level is within normal limits.     CBC is notable for mild improvement in anemia.  Platelet count is also mildly improved.     AFP (a screen for evidence of liver cancer) is within normal limits.     Hepatitis labs so far show no evidence of exposure or immunity to Hepatitis B.  I'm waiting for labs regarding Hepatitis A.  Mr. Nicolas will need immunization against Hepatitis B.

## 2022-06-21 NOTE — TELEPHONE ENCOUNTER
----- Message from Marquis Landis MD sent at 6/21/2022 10:10 AM EDT -----  Hepatitis A antibody testing is negative.  Mr. Nicolas will need Hepatitis A and B immunization.

## 2022-06-21 NOTE — PROGRESS NOTES
Overall, labs are mildly improved.    INR is still elevated but mildly improved.    CMP (which measures electrolytes, kidney function, and liver enzymes) is notable for blood sugar of 156, sodium 134 (which is improved compared to 5 days ago).  AST and alkaline phosphatase (liver enzymes) are elevated.  Magnesium level is within normal limits.    CBC is notable for mild improvement in anemia.  Platelet count is also mildly improved.    AFP (a screen for evidence of liver cancer) is within normal limits.    Hepatitis labs so far show no evidence of exposure or immunity to Hepatitis B.  I'm waiting for labs regarding Hepatitis A.  Mr. Nicolas will need immunization against Hepatitis B.

## 2022-06-21 NOTE — TELEPHONE ENCOUNTER
----- Message from Jonh Franco Jr., MD sent at 6/21/2022  3:23 PM EDT -----  Abnl TSH may be due to recent illness. Ensure complaince with med. Recommend recheck TSH in about 6 weeks.

## 2022-06-22 ENCOUNTER — PATIENT OUTREACH (OUTPATIENT)
Dept: CASE MANAGEMENT | Facility: OTHER | Age: 57
End: 2022-06-22

## 2022-06-22 NOTE — OUTREACH NOTE
Care Coordination    MSW coordinated with Los regarding patient's status. APS report made due to concerns of patient living alone and being unable to care for himself or manage his own medications. Report#[id number].    KALEE CASILLAS -   Ambulatory Case Management    6/22/2022, 12:44 EDT

## 2022-06-22 NOTE — TELEPHONE ENCOUNTER
PT(PATIENT) VERIFIED     CONTACTED PT(PATIENT) PER PROVIDER'S INSTRUCTIONS    PT(PATIENT) STATES HE IS TAKING HIS MEDICATION AS DIRECTED

## 2022-06-23 DIAGNOSIS — R11.0 NAUSEA: ICD-10-CM

## 2022-06-24 ENCOUNTER — TELEPHONE (OUTPATIENT)
Dept: INTERNAL MEDICINE | Facility: CLINIC | Age: 57
End: 2022-06-24

## 2022-06-24 RX ORDER — ONDANSETRON 4 MG/1
TABLET, ORALLY DISINTEGRATING ORAL
Qty: 60 TABLET | Refills: 0 | Status: SHIPPED | OUTPATIENT
Start: 2022-06-24 | End: 2022-06-27 | Stop reason: SDUPTHER

## 2022-06-24 NOTE — TELEPHONE ENCOUNTER
PA REQUIRED HUMALOG KWIKPEN 100UNIT/ML PEN    MEDICATION IS NOT ON PT(PATIENT) CURRENT MED LIST     Insulin Lispro (humaLOG) injection 0-9 Units (05/30/2022)  Insulin Lispro (humaLOG) injection 0-9 Units (05/29/2022)  Insulin Lispro (humaLOG) injection 0-9 Units (05/29/2022)    Order Status: DiscontinueD    PLEASE ADVISE IF STILL NECESSARY    THANK YOU   [de-identified] : Shoulders/Cervical spine\par Pain with cervical spine range of motion. Very tender in the RIGHT mid-rhomboid region which is a significant trigger point. She had a lidocaine patch in this region which I removed.\par There is some tenderness around the LEFT glenohumeral joint. She has significant pain and difficulty actively elevating her LEFT arm. Passively I can elevate the arm to about 170° which is painful.\par There is weakness primarily related to pain of the supraspinatus. Internal and external rotation strength are better 5 minus/5.\par Motor is grossly intact distally. Negative Tinel's at the cubital tunnel.\par  [de-identified] : \par MRI of the LEFT shoulder on January 30, 2019 showed severe degenerative changes in the glenohumeral joint with mild superior migration of the humeral head and mild to moderate synovial thickening with evidence of synovitis and effusion. There is tendinosis and possibly a low-grade partial tear of the subscapularis but no other rotator cuff tears. Biceps anchor appears intact.

## 2022-06-27 ENCOUNTER — APPOINTMENT (OUTPATIENT)
Dept: GENERAL RADIOLOGY | Facility: HOSPITAL | Age: 57
End: 2022-06-27

## 2022-06-27 ENCOUNTER — OFFICE VISIT (OUTPATIENT)
Dept: INTERNAL MEDICINE | Facility: CLINIC | Age: 57
End: 2022-06-27

## 2022-06-27 ENCOUNTER — APPOINTMENT (OUTPATIENT)
Dept: CT IMAGING | Facility: HOSPITAL | Age: 57
End: 2022-06-27

## 2022-06-27 ENCOUNTER — HOSPITAL ENCOUNTER (EMERGENCY)
Facility: HOSPITAL | Age: 57
Discharge: HOME | End: 2022-06-27
Attending: EMERGENCY MEDICINE | Admitting: EMERGENCY MEDICINE

## 2022-06-27 VITALS
WEIGHT: 275.8 LBS | HEART RATE: 90 BPM | DIASTOLIC BLOOD PRESSURE: 81 MMHG | OXYGEN SATURATION: 96 % | BODY MASS INDEX: 41.8 KG/M2 | TEMPERATURE: 97.1 F | SYSTOLIC BLOOD PRESSURE: 164 MMHG | HEIGHT: 68 IN

## 2022-06-27 VITALS
HEIGHT: 68 IN | RESPIRATION RATE: 18 BRPM | SYSTOLIC BLOOD PRESSURE: 151 MMHG | HEART RATE: 82 BPM | OXYGEN SATURATION: 98 % | DIASTOLIC BLOOD PRESSURE: 62 MMHG | TEMPERATURE: 98.2 F | WEIGHT: 275.57 LBS | BODY MASS INDEX: 41.77 KG/M2

## 2022-06-27 DIAGNOSIS — E78.5 HYPERLIPIDEMIA, UNSPECIFIED HYPERLIPIDEMIA TYPE: ICD-10-CM

## 2022-06-27 DIAGNOSIS — N28.9 ACUTE RENAL INSUFFICIENCY: ICD-10-CM

## 2022-06-27 DIAGNOSIS — K74.60 LIVER CIRRHOSIS SECONDARY TO NASH (NONALCOHOLIC STEATOHEPATITIS): ICD-10-CM

## 2022-06-27 DIAGNOSIS — D64.9 ACUTE ON CHRONIC ANEMIA: Primary | ICD-10-CM

## 2022-06-27 DIAGNOSIS — R11.0 NAUSEA: ICD-10-CM

## 2022-06-27 DIAGNOSIS — I10 PRIMARY HYPERTENSION: ICD-10-CM

## 2022-06-27 DIAGNOSIS — R18.8 CIRRHOSIS OF LIVER WITH ASCITES, UNSPECIFIED HEPATIC CIRRHOSIS TYPE: ICD-10-CM

## 2022-06-27 DIAGNOSIS — E86.0 DEHYDRATION: ICD-10-CM

## 2022-06-27 DIAGNOSIS — K27.4 GASTROINTESTINAL HEMORRHAGE ASSOCIATED WITH PEPTIC ULCER: Primary | ICD-10-CM

## 2022-06-27 DIAGNOSIS — K75.81 LIVER CIRRHOSIS SECONDARY TO NASH (NONALCOHOLIC STEATOHEPATITIS): ICD-10-CM

## 2022-06-27 DIAGNOSIS — E11.9 DIABETES MELLITUS WITHOUT COMPLICATION: ICD-10-CM

## 2022-06-27 DIAGNOSIS — K74.60 CIRRHOSIS OF LIVER WITH ASCITES, UNSPECIFIED HEPATIC CIRRHOSIS TYPE: ICD-10-CM

## 2022-06-27 LAB
ABO GROUP BLD: NORMAL
ALBUMIN SERPL-MCNC: 3.2 G/DL (ref 3.5–5.2)
ALBUMIN/GLOB SERPL: 1.1 G/DL
ALP SERPL-CCNC: 134 U/L (ref 39–117)
ALT SERPL W P-5'-P-CCNC: 31 U/L (ref 1–41)
ANION GAP SERPL CALCULATED.3IONS-SCNC: 11.7 MMOL/L (ref 5–15)
AST SERPL-CCNC: 47 U/L (ref 1–40)
BASOPHILS # BLD AUTO: 0.06 10*3/MM3 (ref 0–0.2)
BASOPHILS NFR BLD AUTO: 1.2 % (ref 0–1.5)
BILIRUB SERPL-MCNC: 1.3 MG/DL (ref 0–1.2)
BLD GP AB SCN SERPL QL: NEGATIVE
BUN SERPL-MCNC: 21 MG/DL (ref 6–20)
BUN/CREAT SERPL: 13.3 (ref 7–25)
CALCIUM SPEC-SCNC: 8.2 MG/DL (ref 8.6–10.5)
CHLORIDE SERPL-SCNC: 105 MMOL/L (ref 98–107)
CO2 SERPL-SCNC: 20.3 MMOL/L (ref 22–29)
CREAT SERPL-MCNC: 1.58 MG/DL (ref 0.76–1.27)
D-LACTATE SERPL-SCNC: 1.3 MMOL/L (ref 0.5–2)
DEPRECATED RDW RBC AUTO: 54.4 FL (ref 37–54)
EGFRCR SERPLBLD CKD-EPI 2021: 51 ML/MIN/1.73
EOSINOPHIL # BLD AUTO: 0.2 10*3/MM3 (ref 0–0.4)
EOSINOPHIL NFR BLD AUTO: 4.1 % (ref 0.3–6.2)
ERYTHROCYTE [DISTWIDTH] IN BLOOD BY AUTOMATED COUNT: 15.1 % (ref 12.3–15.4)
ETHANOL BLD-MCNC: <10 MG/DL (ref 0–10)
ETHANOL UR QL: <0.01 %
EXPIRATION DATE: ABNORMAL
GLOBULIN UR ELPH-MCNC: 2.9 GM/DL
GLUCOSE SERPL-MCNC: 170 MG/DL (ref 65–99)
HCT VFR BLD AUTO: 26.8 % (ref 37.5–51)
HEMOCCULT STL QL IA: POSITIVE
HGB BLD-MCNC: 8.8 G/DL (ref 13–17.7)
HGB BLDA-MCNC: 8.2 G/DL (ref 12–17)
HOLD SPECIMEN: NORMAL
HOLD SPECIMEN: NORMAL
IMM GRANULOCYTES # BLD AUTO: 0.02 10*3/MM3 (ref 0–0.05)
IMM GRANULOCYTES NFR BLD AUTO: 0.4 % (ref 0–0.5)
LIPASE SERPL-CCNC: 52 U/L (ref 13–60)
LYMPHOCYTES # BLD AUTO: 0.7 10*3/MM3 (ref 0.7–3.1)
LYMPHOCYTES NFR BLD AUTO: 14.3 % (ref 19.6–45.3)
Lab: ABNORMAL
MCH RBC QN AUTO: 32.1 PG (ref 26.6–33)
MCHC RBC AUTO-ENTMCNC: 32.8 G/DL (ref 31.5–35.7)
MCV RBC AUTO: 97.8 FL (ref 79–97)
MONOCYTES # BLD AUTO: 0.45 10*3/MM3 (ref 0.1–0.9)
MONOCYTES NFR BLD AUTO: 9.2 % (ref 5–12)
NEUTROPHILS NFR BLD AUTO: 3.45 10*3/MM3 (ref 1.7–7)
NEUTROPHILS NFR BLD AUTO: 70.8 % (ref 42.7–76)
NRBC BLD AUTO-RTO: 0 /100 WBC (ref 0–0.2)
NT-PROBNP SERPL-MCNC: 304.4 PG/ML (ref 0–900)
PLATELET # BLD AUTO: 69 10*3/MM3 (ref 140–450)
PMV BLD AUTO: 10.4 FL (ref 6–12)
POTASSIUM SERPL-SCNC: 4.7 MMOL/L (ref 3.5–5.2)
PROT SERPL-MCNC: 6.1 G/DL (ref 6–8.5)
QT INTERVAL: 400 MS
RBC # BLD AUTO: 2.74 10*6/MM3 (ref 4.14–5.8)
RH BLD: POSITIVE
SODIUM SERPL-SCNC: 137 MMOL/L (ref 136–145)
T&S EXPIRATION DATE: NORMAL
TROPONIN T SERPL-MCNC: <0.01 NG/ML (ref 0–0.03)
WBC NRBC COR # BLD: 4.88 10*3/MM3 (ref 3.4–10.8)
WHOLE BLOOD HOLD COAG: NORMAL
WHOLE BLOOD HOLD SPECIMEN: NORMAL

## 2022-06-27 PROCEDURE — 85025 COMPLETE CBC W/AUTO DIFF WBC: CPT | Performed by: EMERGENCY MEDICINE

## 2022-06-27 PROCEDURE — 25010000002 ONDANSETRON PER 1 MG: Performed by: EMERGENCY MEDICINE

## 2022-06-27 PROCEDURE — 96375 TX/PRO/DX INJ NEW DRUG ADDON: CPT

## 2022-06-27 PROCEDURE — 84484 ASSAY OF TROPONIN QUANT: CPT | Performed by: EMERGENCY MEDICINE

## 2022-06-27 PROCEDURE — 99284 EMERGENCY DEPT VISIT MOD MDM: CPT

## 2022-06-27 PROCEDURE — 93005 ELECTROCARDIOGRAM TRACING: CPT | Performed by: EMERGENCY MEDICINE

## 2022-06-27 PROCEDURE — 85018 HEMOGLOBIN: CPT | Performed by: INTERNAL MEDICINE

## 2022-06-27 PROCEDURE — 25010000002 MORPHINE PER 10 MG: Performed by: EMERGENCY MEDICINE

## 2022-06-27 PROCEDURE — 82077 ASSAY SPEC XCP UR&BREATH IA: CPT | Performed by: EMERGENCY MEDICINE

## 2022-06-27 PROCEDURE — 96374 THER/PROPH/DIAG INJ IV PUSH: CPT

## 2022-06-27 PROCEDURE — 83880 ASSAY OF NATRIURETIC PEPTIDE: CPT | Performed by: EMERGENCY MEDICINE

## 2022-06-27 PROCEDURE — 99214 OFFICE O/P EST MOD 30 MIN: CPT | Performed by: INTERNAL MEDICINE

## 2022-06-27 PROCEDURE — 36415 COLL VENOUS BLD VENIPUNCTURE: CPT

## 2022-06-27 PROCEDURE — 83605 ASSAY OF LACTIC ACID: CPT | Performed by: EMERGENCY MEDICINE

## 2022-06-27 PROCEDURE — 74176 CT ABD & PELVIS W/O CONTRAST: CPT

## 2022-06-27 PROCEDURE — 86900 BLOOD TYPING SEROLOGIC ABO: CPT | Performed by: EMERGENCY MEDICINE

## 2022-06-27 PROCEDURE — 71045 X-RAY EXAM CHEST 1 VIEW: CPT

## 2022-06-27 PROCEDURE — 82274 ASSAY TEST FOR BLOOD FECAL: CPT | Performed by: EMERGENCY MEDICINE

## 2022-06-27 PROCEDURE — 86901 BLOOD TYPING SEROLOGIC RH(D): CPT | Performed by: EMERGENCY MEDICINE

## 2022-06-27 PROCEDURE — 83690 ASSAY OF LIPASE: CPT | Performed by: EMERGENCY MEDICINE

## 2022-06-27 PROCEDURE — 86850 RBC ANTIBODY SCREEN: CPT | Performed by: EMERGENCY MEDICINE

## 2022-06-27 PROCEDURE — 80053 COMPREHEN METABOLIC PANEL: CPT | Performed by: EMERGENCY MEDICINE

## 2022-06-27 RX ORDER — ONDANSETRON 4 MG/1
4 TABLET, ORALLY DISINTEGRATING ORAL EVERY 6 HOURS PRN
Qty: 60 TABLET | Refills: 0 | Status: SHIPPED | OUTPATIENT
Start: 2022-06-27 | End: 2022-08-02 | Stop reason: ALTCHOICE

## 2022-06-27 RX ORDER — ONDANSETRON 2 MG/ML
4 INJECTION INTRAMUSCULAR; INTRAVENOUS ONCE
Status: COMPLETED | OUTPATIENT
Start: 2022-06-27 | End: 2022-06-27

## 2022-06-27 RX ORDER — PANTOPRAZOLE SODIUM 40 MG/10ML
40 INJECTION, POWDER, LYOPHILIZED, FOR SOLUTION INTRAVENOUS ONCE
Status: COMPLETED | OUTPATIENT
Start: 2022-06-27 | End: 2022-06-27

## 2022-06-27 RX ORDER — SODIUM CHLORIDE 0.9 % (FLUSH) 0.9 %
10 SYRINGE (ML) INJECTION AS NEEDED
Status: DISCONTINUED | OUTPATIENT
Start: 2022-06-27 | End: 2022-06-27 | Stop reason: HOSPADM

## 2022-06-27 RX ORDER — OXYCODONE HYDROCHLORIDE 5 MG/1
5 TABLET ORAL EVERY 6 HOURS PRN
Qty: 9 TABLET | Refills: 0 | Status: SHIPPED | OUTPATIENT
Start: 2022-06-27 | End: 2022-07-08

## 2022-06-27 RX ORDER — OMEPRAZOLE 40 MG/1
40 CAPSULE, DELAYED RELEASE ORAL 2 TIMES DAILY
Qty: 180 CAPSULE | Refills: 3 | Status: SHIPPED | OUTPATIENT
Start: 2022-06-27 | End: 2023-01-20 | Stop reason: SDUPTHER

## 2022-06-27 RX ADMIN — ONDANSETRON 4 MG: 2 INJECTION INTRAMUSCULAR; INTRAVENOUS at 16:07

## 2022-06-27 RX ADMIN — PANTOPRAZOLE SODIUM 40 MG: 40 INJECTION, POWDER, FOR SOLUTION INTRAVENOUS at 16:07

## 2022-06-27 RX ADMIN — MORPHINE SULFATE 4 MG: 4 INJECTION INTRAVENOUS at 16:07

## 2022-06-28 ENCOUNTER — PATIENT OUTREACH (OUTPATIENT)
Dept: CASE MANAGEMENT | Facility: OTHER | Age: 57
End: 2022-06-28

## 2022-06-28 DIAGNOSIS — K74.60 CIRRHOSIS OF LIVER WITH ASCITES, UNSPECIFIED HEPATIC CIRRHOSIS TYPE: Primary | ICD-10-CM

## 2022-06-28 DIAGNOSIS — R18.8 CIRRHOSIS OF LIVER WITH ASCITES, UNSPECIFIED HEPATIC CIRRHOSIS TYPE: Primary | ICD-10-CM

## 2022-06-28 DIAGNOSIS — K76.82 HEPATIC ENCEPHALOPATHY: ICD-10-CM

## 2022-06-28 DIAGNOSIS — I10 HYPERTENSION, UNSPECIFIED TYPE: ICD-10-CM

## 2022-06-28 NOTE — OUTREACH NOTE
AMBULATORY CASE MANAGEMENT NOTE    Name and Relationship of Patient/Support Person: Dr. Franco (PCP) -     [facility] Interim Update        Per chart review patient was seen last date by PCP and referred to ED for blood in stool. Per4 chart patient was seen in ED and declined to be admitted. Please see ER note for further details. PCP aware       Education Documentation  No documentation found.        KAYLA NARANJO  Ambulatory Case Management    6/28/2022, 12:45 EDT

## 2022-06-29 ENCOUNTER — TELEPHONE (OUTPATIENT)
Dept: INTERNAL MEDICINE | Facility: CLINIC | Age: 57
End: 2022-06-29

## 2022-06-29 ENCOUNTER — READMISSION MANAGEMENT (OUTPATIENT)
Dept: CALL CENTER | Facility: HOSPITAL | Age: 57
End: 2022-06-29

## 2022-06-29 ENCOUNTER — TELEPHONE (OUTPATIENT)
Dept: ORTHOPEDIC SURGERY | Facility: CLINIC | Age: 57
End: 2022-06-29

## 2022-06-29 DIAGNOSIS — S42.254A CLOSED NONDISPLACED FRACTURE OF GREATER TUBEROSITY OF RIGHT HUMERUS, INITIAL ENCOUNTER: Primary | ICD-10-CM

## 2022-06-29 RX ORDER — HYDROCODONE BITARTRATE AND ACETAMINOPHEN 5; 325 MG/1; MG/1
1 TABLET ORAL EVERY 6 HOURS PRN
Qty: 28 TABLET | Refills: 0 | Status: SHIPPED | OUTPATIENT
Start: 2022-06-29 | End: 2022-07-08

## 2022-06-29 NOTE — TELEPHONE ENCOUNTER
Relationship: PATIENT     Best call back number: [phone]    Requested Prescriptions: OXYCODONE 5 MG. IMMEDIATE RELEASE        Pharmacy where request should be sent:  [facility]- [phone]    Additional details provided by patient: PT. HAS NEW PT. / HOSPITAL FOLLOW UP WITH DR. GÓMEZ ON 07/08/22 FOR RIGHT SHOULDER.     PT. IS ASKING IF DR. GÓMEZ CAN SEND IN A REFILL FOR THE OXYCODONE, OR, CAN APPT. BE MOVED UP?   PLEASE CALL TO ADVISE.       Does the patient have less than a 3 day supply:  [x] Yes  [] No

## 2022-06-29 NOTE — OUTREACH NOTE
Medical Week 2 Survey    Flowsheet Row Responses   [facility] patient discharged from? [facility]   Does the patient have one of the following disease processes/diagnoses(primary or secondary)? Other   Week 2 attempt successful? Yes   Call start time 1449   Discharge diagnosis hepatic encephalopathy, ROMO cirrhosis, nondisplaced humerus fracture, IVELISSE, IDDM, Major depression with suicidal ideation   Call end time 1456   Meds reviewed with patient/caregiver? Yes   Is the patient having any side effects they believe may be caused by any medication additions or changes? No   Does the patient have all medications ordered at discharge? Yes   Is the patient taking all medications as directed (includes completed medication regime)? Yes   Does the patient have a primary care provider?  Yes   Does the patient have an appointment with their PCP within 7 days of discharge? Yes   Has the patient kept scheduled appointments due by today? Yes   Has home health visited the patient within 72 hours of discharge? N/A   Psychosocial issues? No   Did the patient receive a copy of their discharge instructions? Yes   Nursing interventions Reviewed instructions with patient   What is the patient's perception of their health status since discharge? Improving   Is the patient/caregiver able to teach back signs and symptoms related to disease process for when to call PCP? Yes   Is the patient/caregiver able to teach back signs and symptoms related to disease process for when to call 911? Yes   Is the patient/caregiver able to teach back the hierarchy of who to call/visit for symptoms/problems? PCP, Specialist, Home health nurse, Urgent Care, ED, 911 Yes   If the patient is a current smoker, are they able to teach back resources for cessation? Not a smoker   Additional teach back comments Encouraged seeing ortho sooner than the 8th of July.   Week 2 Call Completed? Yes   Wrap up additional comments Says he is feeling some better. He is  concerned about his pain medication.          SUSANA NARANJO - Registered Nurse

## 2022-06-29 NOTE — TELEPHONE ENCOUNTER
Both of those medications can cause an altered mental state which is not good since ammonia levels have not been kept under control. He should contact his orthopedic to see if they are willing to write for those medications, but I do not think they are good idea for his overall health.

## 2022-06-29 NOTE — TELEPHONE ENCOUNTER
PT(PATIENT) VERIFIED      PT(PATIENT) STATES HE IS REQUESTING A MUSCLE RELAXER AND PAIN MEDICATION     PT(PATIENT) STATES HE HAS A LIMITED SUPPLY AND IS ALMOST OUT OF MEDICATION     PT(PATIENT) STATES HE IS FEARFUL OF RUNNING OUT OF MEDICATION     PT(PATIENT) WAS RECENTLY SEEN IN OFFICE AND TRANSPORTED VIA EMS TO THE ER     PROVIDER PLEASE ADVISE

## 2022-06-29 NOTE — TELEPHONE ENCOUNTER
PT(PATIENT) VERIFIED     CONTACTED PT(PATIENT) PER PROVIDER'S INSTRUCTIONS    OK FOR HUB TO ADVISE/READ     PER PROVIDER    Jonh Franco Jr., MD 22 12:12 PM    Both of those medications can cause an altered mental state which is not good since ammonia levels have not been kept under control. He should contact his orthopedic to see if they are willing to write for those medications, but I do not think they are good idea for his overall health.

## 2022-06-30 ENCOUNTER — PATIENT OUTREACH (OUTPATIENT)
Dept: CASE MANAGEMENT | Facility: OTHER | Age: 57
End: 2022-06-30

## 2022-06-30 DIAGNOSIS — K74.60 CIRRHOSIS OF LIVER WITH ASCITES, UNSPECIFIED HEPATIC CIRRHOSIS TYPE: Primary | ICD-10-CM

## 2022-06-30 DIAGNOSIS — I10 HYPERTENSION, UNSPECIFIED TYPE: ICD-10-CM

## 2022-06-30 DIAGNOSIS — R18.8 CIRRHOSIS OF LIVER WITH ASCITES, UNSPECIFIED HEPATIC CIRRHOSIS TYPE: Primary | ICD-10-CM

## 2022-06-30 DIAGNOSIS — I63.9 CEREBROVASCULAR ACCIDENT (CVA), UNSPECIFIED MECHANISM: ICD-10-CM

## 2022-06-30 NOTE — OUTREACH NOTE
[facility] End of Month Documentation    This Chronic Medical Management Care Plan for Nic Nicolas, 57 y.o. male, has been monitored and managed; reviewed; established and a new plan of care implemented for the month of June.  A cumulative time of 39  minutes was spent on this patient record this month, including electronic communication with primary care provider; chart review; phone call with other providers, Chart review , electronic communication to provider , multiple phone calls.    Regarding the patient's problems: has Arthritis, senescent; Body mass index (BMI) 40.0-44.9, adult (HCC); Colon polyps; Liver cirrhosis secondary to ROMO (nonalcoholic steatohepatitis) (HCC); Multiple episodes of deep venous thrombosis (HCC); High blood pressure; Hyperlipidemia; Pancytopenia (HCC); Sleep apnea; Systolic congestive heart failure (HCC); Bilateral lower extremity edema; Chronic cystitis; Chronic low back pain; Diabetes mellitus without complication (HCC); Acid reflux; Acetabular fracture (HCC); Gross hematuria; Hesitancy of micturition; Gastrointestinal hemorrhage associated with peptic ulcer; Anxiety; Hepatic encephalopathy (HCC); Stroke (HCC); Thrombocytopenia (HCC); and Amphetamine abuse (HCC) on their problem list., the following items were addressed: medical records; medications; transitions to medical care, patient admitted and any changes can be found within the plan section of the note.  A detailed listing of time spent for chronic care management is tracked within each outreach encounter.  Current medications include:  has a current medication list which includes the following prescription(s): albuterol sulfate hfa, atorvastatin, buspirone, cetirizine, diclofenac sodium, fluticasone, fluticasone, folic acid, furosemide, hydrocodone-acetaminophen, insulin detemir, lactulose, freestyle, omeprazole, ondansetron odt, oxycodone, potassium chloride, rifaximin, sertraline, spironolactone, thiamine, and [DISCONTINUED]  fluticasone. and the patient is reported to be patient is noncompliant with medication protocol,  Medications are reported to be non-effective in controlling symptoms and changes have been made to the medication protocol. All notes on chart for PCP to review.    The patient was monitored remotely for blood pressure; blood glucose; mood & behavior; pain; medications, poly diagnosis.    The patient's physical needs include:  help taking medications as prescribed; needs assistance with ADLs; resources for disability needs; physical healthcare; medication education; physician referral.     The patient's mental support needs include:  increased support    The patient's cognitive support needs include:  increased support; medication; health care; emotional care; supervision    The patient's psychosocial support needs include:  need for increased support    The patient's functional needs include: physician referral; medication education; resources for disability needs    The patient's environmental needs include:  no involvement in outside activities or no access to other activities; no access to transportation, N/A    Care Plan overall comments:  On going eval New care plan in place    Refer to previous outreach notes for more information on the areas listed above.    Monthly Billing Diagnoses  No diagnosis found.    Medications   · Medications have been reconciled    Care Plan progress this month:      Recently Modified Care Plans Updates made since 5/30/2022 12:00 AM    No recently modified care plans.          · Current Specialty Plan of Care Status in process    Instructions   · Patient was provided an electronic copy of care plan  · CCM services were explained and offered and patient has accepted these services.  · Patient has given their written consent to receive CCM services and understands that this includes the authorization of electronic communication of medical information with the other treating  providers.  · Patient understands that they may stop CCM services at any time and these changes will be effective at the end of the calendar month and will effectively revocate the agreement of CCM services.  · Patient understands that only one practitioner can furnish and be paid for CCM services during one calendar month.  Patient also understands that there may be co-payment or deductible fees in association with CCM services.  · Patient will continue with at least monthly follow-up calls with the Nurse Navigator.    KAYLA NARANJO  Ambulatory Case Management    6/30/2022, 07:35 EDT

## 2022-06-30 NOTE — TELEPHONE ENCOUNTER
PT(PATIENT) VERIFIED      PT(PATIENT) STATES HE IS HAVING HORRIBLE MANSOOR HORSES     PT(PATIENT) STATES HE NEEDS PAIN MEDICATION     PT(PATIENT) ADVISED PER DR FRANCO PERVIOUS NOTE     Jonh Franco Jr., MD 22 12:12 PM  Both of those medications can cause an altered mental state which is not good since ammonia levels have not been kept under control. He should contact his orthopedic to see if they are willing to write for those medications, but I do not think they are good idea for his overall health.         PT(PATIENT) STATES HE DISCOVERED THAT HE HAS A SMALL HOLE AROUND THE BOTTOM OF HIS STOMACH LAST NIGHT IN THE SHOWER    PT(PATIENT) STATES THE HOLE IS LEAKING, NOT BLOOD, BUT CLEAR LIQUID THAT IS SOAKING HIS CLOTHING     PT(PATIENT) ADVISED TO IMMEDIATELY GO TO THE ER FOR MEDICAL TREATMENT     PT(PATIENT) AGREED TO THE ER     PT(PATIENT) BECAME VERY UPSET WHEN HE WAS ADVISED OF DR FRANCO'S PREVIOUS NOTE REGARDING PAIN MEDICATION     PT(PATIENT) AGAINST ADVISED TO GO TO THE ER     PT(PATIENT) ENDED PHONE CALL

## 2022-07-07 ENCOUNTER — READMISSION MANAGEMENT (OUTPATIENT)
Dept: CALL CENTER | Facility: HOSPITAL | Age: 57
End: 2022-07-07

## 2022-07-07 NOTE — OUTREACH NOTE
Medical Week 3 Survey    Flowsheet Row Responses   [facility] patient discharged from? [facility]   Does the patient have one of the following disease processes/diagnoses(primary or secondary)? Other   Week 3 attempt successful? Yes   Call start time 1225   Call end time 1229   Discharge diagnosis hepatic encephalopathy, ROMO cirrhosis, nondisplaced humerus fracture, IVELISSE, IDDM, Major depression with suicidal ideation   Meds reviewed with patient/caregiver? Yes   Is the patient having any side effects they believe may be caused by any medication additions or changes? No   Is the patient taking all medications as directed (includes completed medication regime)? Yes   Does the patient have a primary care provider?  Yes   Comments regarding PCP 7/8/22   Has the patient kept scheduled appointments due by today? Yes   Has home health visited the patient within 72 hours of discharge? N/A   Psychosocial issues? No   What is the patient's perception of their health status since discharge? Improving   Is the patient/caregiver able to teach back the hierarchy of who to call/visit for symptoms/problems? PCP, Specialist, Home health nurse, Urgent Care, ED, 911 Yes   Week 3 Call Completed? Yes          JOHNATHAN SORTO - Registered Nurse

## 2022-07-08 ENCOUNTER — OFFICE VISIT (OUTPATIENT)
Dept: INTERNAL MEDICINE | Facility: CLINIC | Age: 57
End: 2022-07-08

## 2022-07-08 ENCOUNTER — OFFICE VISIT (OUTPATIENT)
Dept: ORTHOPEDIC SURGERY | Facility: CLINIC | Age: 57
End: 2022-07-08

## 2022-07-08 VITALS — OXYGEN SATURATION: 98 % | BODY MASS INDEX: 38.65 KG/M2 | WEIGHT: 255 LBS | HEIGHT: 68 IN | HEART RATE: 84 BPM

## 2022-07-08 VITALS
TEMPERATURE: 97.5 F | DIASTOLIC BLOOD PRESSURE: 83 MMHG | HEART RATE: 88 BPM | BODY MASS INDEX: 38.04 KG/M2 | SYSTOLIC BLOOD PRESSURE: 159 MMHG | WEIGHT: 251 LBS | OXYGEN SATURATION: 97 % | HEIGHT: 68 IN

## 2022-07-08 DIAGNOSIS — K75.81 LIVER CIRRHOSIS SECONDARY TO NASH (NONALCOHOLIC STEATOHEPATITIS): Primary | ICD-10-CM

## 2022-07-08 DIAGNOSIS — M25.511 RIGHT SHOULDER PAIN, UNSPECIFIED CHRONICITY: Primary | ICD-10-CM

## 2022-07-08 DIAGNOSIS — E78.5 HYPERLIPIDEMIA, UNSPECIFIED HYPERLIPIDEMIA TYPE: ICD-10-CM

## 2022-07-08 DIAGNOSIS — G25.81 RLS (RESTLESS LEGS SYNDROME): ICD-10-CM

## 2022-07-08 DIAGNOSIS — D69.6 THROMBOCYTOPENIA: Chronic | ICD-10-CM

## 2022-07-08 DIAGNOSIS — I50.22 CHRONIC SYSTOLIC CONGESTIVE HEART FAILURE: ICD-10-CM

## 2022-07-08 DIAGNOSIS — K74.60 LIVER CIRRHOSIS SECONDARY TO NASH (NONALCOHOLIC STEATOHEPATITIS): Primary | ICD-10-CM

## 2022-07-08 DIAGNOSIS — E11.9 DIABETES MELLITUS WITHOUT COMPLICATION: ICD-10-CM

## 2022-07-08 DIAGNOSIS — S42.254D CLOSED NONDISPLACED FRACTURE OF GREATER TUBEROSITY OF RIGHT HUMERUS WITH ROUTINE HEALING, SUBSEQUENT ENCOUNTER: ICD-10-CM

## 2022-07-08 LAB
ALBUMIN SERPL-MCNC: 3.3 G/DL (ref 3.5–5.2)
ALBUMIN/GLOB SERPL: 1.1 G/DL
ALP SERPL-CCNC: 112 U/L (ref 39–117)
ALT SERPL W P-5'-P-CCNC: 25 U/L (ref 1–41)
ANION GAP SERPL CALCULATED.3IONS-SCNC: 10.8 MMOL/L (ref 5–15)
AST SERPL-CCNC: 37 U/L (ref 1–40)
BASOPHILS # BLD AUTO: 0.03 10*3/MM3 (ref 0–0.2)
BASOPHILS NFR BLD AUTO: 0.6 % (ref 0–1.5)
BILIRUB SERPL-MCNC: 1 MG/DL (ref 0–1.2)
BUN SERPL-MCNC: 12 MG/DL (ref 6–20)
BUN/CREAT SERPL: 14.8 (ref 7–25)
CALCIUM SPEC-SCNC: 8.5 MG/DL (ref 8.6–10.5)
CHLORIDE SERPL-SCNC: 103 MMOL/L (ref 98–107)
CHOLEST SERPL-MCNC: 117 MG/DL (ref 0–200)
CO2 SERPL-SCNC: 23.2 MMOL/L (ref 22–29)
CREAT SERPL-MCNC: 0.81 MG/DL (ref 0.76–1.27)
DEPRECATED RDW RBC AUTO: 44.4 FL (ref 37–54)
EGFRCR SERPLBLD CKD-EPI 2021: 103.5 ML/MIN/1.73
EOSINOPHIL # BLD AUTO: 0.1 10*3/MM3 (ref 0–0.4)
EOSINOPHIL NFR BLD AUTO: 2.1 % (ref 0.3–6.2)
ERYTHROCYTE [DISTWIDTH] IN BLOOD BY AUTOMATED COUNT: 13.2 % (ref 12.3–15.4)
GLOBULIN UR ELPH-MCNC: 3 GM/DL
GLUCOSE SERPL-MCNC: 179 MG/DL (ref 65–99)
HBA1C MFR BLD: 6.3 % (ref 4.8–5.6)
HCT VFR BLD AUTO: 26.4 % (ref 37.5–51)
HDLC SERPL-MCNC: 59 MG/DL (ref 40–60)
HGB BLD-MCNC: 8.7 G/DL (ref 13–17.7)
IMM GRANULOCYTES # BLD AUTO: 0.02 10*3/MM3 (ref 0–0.05)
IMM GRANULOCYTES NFR BLD AUTO: 0.4 % (ref 0–0.5)
LDLC SERPL CALC-MCNC: 49 MG/DL (ref 0–100)
LDLC/HDLC SERPL: 0.88 {RATIO}
LYMPHOCYTES # BLD AUTO: 0.77 10*3/MM3 (ref 0.7–3.1)
LYMPHOCYTES NFR BLD AUTO: 16.2 % (ref 19.6–45.3)
MCH RBC QN AUTO: 31 PG (ref 26.6–33)
MCHC RBC AUTO-ENTMCNC: 33 G/DL (ref 31.5–35.7)
MCV RBC AUTO: 94 FL (ref 79–97)
MONOCYTES # BLD AUTO: 0.41 10*3/MM3 (ref 0.1–0.9)
MONOCYTES NFR BLD AUTO: 8.6 % (ref 5–12)
NEUTROPHILS NFR BLD AUTO: 3.43 10*3/MM3 (ref 1.7–7)
NEUTROPHILS NFR BLD AUTO: 72.1 % (ref 42.7–76)
NRBC BLD AUTO-RTO: 0 /100 WBC (ref 0–0.2)
NT-PROBNP SERPL-MCNC: 83.1 PG/ML (ref 0–900)
PLATELET # BLD AUTO: 94 10*3/MM3 (ref 140–450)
PMV BLD AUTO: 10.2 FL (ref 6–12)
POTASSIUM SERPL-SCNC: 4.3 MMOL/L (ref 3.5–5.2)
PROT SERPL-MCNC: 6.3 G/DL (ref 6–8.5)
RBC # BLD AUTO: 2.81 10*6/MM3 (ref 4.14–5.8)
SODIUM SERPL-SCNC: 137 MMOL/L (ref 136–145)
TRIGL SERPL-MCNC: 31 MG/DL (ref 0–150)
TSH SERPL DL<=0.05 MIU/L-ACNC: 4.45 UIU/ML (ref 0.27–4.2)
VLDLC SERPL-MCNC: 9 MG/DL (ref 5–40)
WBC NRBC COR # BLD: 4.76 10*3/MM3 (ref 3.4–10.8)

## 2022-07-08 PROCEDURE — 80053 COMPREHEN METABOLIC PANEL: CPT | Performed by: INTERNAL MEDICINE

## 2022-07-08 PROCEDURE — 99203 OFFICE O/P NEW LOW 30 MIN: CPT | Performed by: ORTHOPAEDIC SURGERY

## 2022-07-08 PROCEDURE — 83036 HEMOGLOBIN GLYCOSYLATED A1C: CPT | Performed by: INTERNAL MEDICINE

## 2022-07-08 PROCEDURE — 80061 LIPID PANEL: CPT | Performed by: INTERNAL MEDICINE

## 2022-07-08 PROCEDURE — 85025 COMPLETE CBC W/AUTO DIFF WBC: CPT | Performed by: INTERNAL MEDICINE

## 2022-07-08 PROCEDURE — 84443 ASSAY THYROID STIM HORMONE: CPT | Performed by: INTERNAL MEDICINE

## 2022-07-08 PROCEDURE — 83880 ASSAY OF NATRIURETIC PEPTIDE: CPT | Performed by: INTERNAL MEDICINE

## 2022-07-08 PROCEDURE — 99214 OFFICE O/P EST MOD 30 MIN: CPT | Performed by: INTERNAL MEDICINE

## 2022-07-08 RX ORDER — ROPINIROLE 1 MG/1
1 TABLET, FILM COATED ORAL NIGHTLY
Qty: 90 TABLET | Refills: 0 | Status: SHIPPED | OUTPATIENT
Start: 2022-07-08 | End: 2022-07-20

## 2022-07-08 RX ORDER — PROPRANOLOL HCL 60 MG
60 CAPSULE, EXTENDED RELEASE 24HR ORAL DAILY
Qty: 90 CAPSULE | Refills: 1 | Status: SHIPPED | OUTPATIENT
Start: 2022-07-08 | End: 2022-08-02 | Stop reason: SDUPTHER

## 2022-07-08 RX ORDER — HYDROCODONE BITARTRATE AND ACETAMINOPHEN 7.5; 325 MG/1; MG/1
1 TABLET ORAL EVERY 6 HOURS PRN
Qty: 28 TABLET | Refills: 0 | Status: SHIPPED | OUTPATIENT
Start: 2022-07-08 | End: 2022-07-14 | Stop reason: SDUPTHER

## 2022-07-08 NOTE — PROGRESS NOTES
"Chief Complaint  Initial Evaluation of the Right Shoulder     Subjective      Nic Nicolas presents to [facility] for an evaluation of right shoulder. On 6/1/22, patient had a fall resulting in pain in the right shoulder. He had immediate pain. He had x-rays in the ER revealing a humerus fracture. He was hospitalized for some time, this is his first follow-up in office. He has come out of the sling some.    No Known Allergies     Social History     Socioeconomic History   • Marital status:    Tobacco Use   • Smoking status: Never Smoker   • Smokeless tobacco: Never Used   Vaping Use   • Vaping Use: Never used   Substance and Sexual Activity   • Alcohol use: Never   • Drug use: Never   • Sexual activity: Defer        Review of Systems     Objective   Vital Signs:   Pulse 84   Ht 172.7 cm (68\")   Wt 116 kg (255 lb)   SpO2 98%   BMI 38.77 kg/m²       Physical Exam  Constitutional:       Appearance: Normal appearance. Patient is well-developed and normal weight.   HENT:      Head: Normocephalic.      Right Ear: Hearing and external ear normal.      Left Ear: Hearing and external ear normal.      Nose: Nose normal.   Eyes:      Conjunctiva/sclera: Conjunctivae normal.   Cardiovascular:      Rate and Rhythm: Normal rate.   Pulmonary:      Effort: Pulmonary effort is normal.      Breath sounds: No wheezing or rales.   Abdominal:      Palpations: Abdomen is soft.      Tenderness: There is no abdominal tenderness.   Musculoskeletal:      Cervical back: Normal range of motion.   Skin:     Findings: No rash.   Neurological:      Mental Status: Patient  is alert and oriented to person, place, and time.   Psychiatric:         Mood and Affect: Mood and affect normal.         Judgment: Judgment normal.       Ortho Exam      RIGHT SHOULDER: Good tone of deltoid, biceps, triceps, wrist extensors, and wrist flexors.  Intact wrist and elbow motion. No swelling, skin discoloration or " atrophy. Radial pulse 2+, ulnar pulse 2+.       Procedures        Imaging Results (Most Recent)     Procedure Component Value Units Date/Time    XR Scapula Right [[id number]] Resulted: 07/08/22 1357     Updated: 07/08/22 1401           Result Review :     X-Ray Report:  Right shoulder(s) X-Ray  Indication: Evaluation of right shoulder pain   AP and Lateral view(s)  Findings:  Stable nondisplaced minimally comminuted fracture of the greater tuberosity of the proximal humerus.   Prior studies available for comparison: no     Assessment and Plan     Diagnoses and all orders for this visit:    1. Right shoulder pain, unspecified chronicity (Primary)  -     XR Scapula Right    2. Closed nondisplaced fracture of greater tuberosity of right humerus with routine healing, subsequent encounter        Come out of the sling and work on gentle motion of the shoulder. This is demonstrated in office. Repeat films next visit.     Call or return if worsening symptoms.    Follow Up     3 weeks.       Patient was given instructions and counseling regarding his condition or for health maintenance advice. Please see specific information pulled into the AVS if appropriate.     Scribed for Carl Ortega MD by Bindu Arizmendi.  07/08/22   14:03 EDT      I have personally performed the services described in this document as scribed by the above individual and it is both accurate and complete. Carl Ortega MD 07/08/22

## 2022-07-08 NOTE — PROGRESS NOTES
"Chief Complaint  Abdominal Pain (PATIENT STATES \"THE HERNIA HE HAS IS MASSIVE\"), Shoulder Pain (HAS AN APPT WITH DR. LOUIE ), and Rash (ON INNER THIGHS, WHERE BELLY SAGS AND GROIN )    Subjective          Nic Nicolas presents to [facility]  History of Present Illness  Patient reports his BMs have been closer to normal. Patient denies further episodes melena and hematochezia since last appointment.   Cirrhosis- patient reports urinating frequently. Patient reports having abdominal discomfort.  Patient reports chronic pain. He reports previously being on dilaudid and fentanyl.   hyperlipidemia- doing well on statin  Patient would like prescription for optifoam bandages for his leg wounds. Patient reports improving in his rash in his groin.   hypertension- patient denies HEADACHES, dizziness, chest pain. Patient without home Blood Pressure readings.   DM2- patient reports BG have been consistently under 200. Patient reports taking levemir 10U daily. Patient is due for ophtho exam.     Current Outpatient Medications   Medication Instructions   • albuterol sulfate  (90 Base) MCG/ACT inhaler 2 puffs, Inhalation, Every 4 Hours PRN   • atorvastatin (LIPITOR) 40 mg, Oral, Daily   • busPIRone (BUSPAR) 7.5 MG tablet TAKE ONE TABLET BY MOUTH THREE TIMES A DAY   • cetirizine (ZYRTEC) 10 mg, Oral, Daily   • Diclofenac Sodium (VOLTAREN) 1 % gel gel Apply to affected area as needed for pain   • fluticasone (FLONASE) 50 MCG/ACT nasal spray 1 spray, Nasal, Daily   • fluticasone (FLOVENT HFA) 110 MCG/ACT inhaler 1 puff, Inhalation, 2 Times Daily - RT   • folic acid (FOLVITE) 1 mg, Oral, Daily   • furosemide (LASIX) 40 mg, Oral, 2 Times Daily   • lactulose (CHRONULAC) 20 g, Oral, 3 Times Daily   • Lancets (freestyle) lancets USE TO CHECK BLOOD SUGAR 5 TIMES PER DAY.   • Levemir FlexTouch 10 Units, Subcutaneous, Nightly   • omeprazole (PRILOSEC) 40 mg, Oral, 2 Times Daily   • " "ondansetron ODT (ZOFRAN-ODT) 4 mg, Translingual, Every 6 Hours PRN   • potassium chloride (K-DUR,KLOR-CON) 20 MEQ CR tablet 20 mEq, Oral, Daily   • propranolol LA (INDERAL LA) 60 mg, Oral, Daily   • riFAXIMin (XIFAXAN) 550 mg, Oral, Every 12 Hours Scheduled   • rOPINIRole (REQUIP) 1 mg, Oral, Nightly, Take 1 hour before bedtime.   • sertraline (ZOLOFT) 50 MG tablet No dose, route, or frequency recorded.   • spironolactone (ALDACTONE) 200 mg, Oral, Daily   • thiamine (VITAMIN B-1) 100 mg, Oral, Daily       The following portions of the patient's history were reviewed and updated as appropriate: allergies, current medications, past family history, past medical history, past social history, past surgical history, and problem list.    Objective   Vital Signs:   /83 (BP Location: Left arm, Patient Position: Sitting, Cuff Size: Large Adult)   Pulse 88   Temp 97.5 °F (36.4 °C) (Temporal)   Ht 172.7 cm (68\")   Wt 114 kg (251 lb)   SpO2 97%   BMI 38.16 kg/m²     Wt Readings from Last 3 Encounters:   07/08/22 114 kg (251 lb)   06/27/22 125 kg (275 lb 9.2 oz)   06/27/22 125 kg (275 lb 12.8 oz)     BP Readings from Last 3 Encounters:   07/08/22 159/83   06/27/22 151/62   06/27/22 164/81     Physical Exam   Appearance: No acute distress, well-nourished  Head: normocephalic, atraumatic  Eyes: extraocular movements intact, no scleral icterus, no conjunctival injection  Ears, Nose, and Throat: external ears normal, nares patent, moist mucous membranes  Cardiovascular: regular rate and rhythm. no murmurs, rubs, or gallops. no edema  Respiratory: breathing comfortably, symmetric chest rise, clear to auscultation bilaterally. No wheezes, rales, or rhonchi.  Neuro: alert and oriented to time, place, and person. Normal gait  Psych: normal mood and affect     Result Review :   The following data was reviewed by: Jonh Franco Jr, MD on 07/08/2022:  Common labs    Common Labsle 6/16/22 6/20/22 6/20/22 6/27/22 6/27/22 " 6/27/22     1652 1652 1351 1449 1449   Glucose 194 (A)  156 (A)   170 (A)   BUN 19  26 (A)   21 (A)   Creatinine 0.95  1.18   1.58 (A)   Sodium 131 (A)  134 (A)   137   Potassium 4.4  4.6   4.7   Chloride 97 (A)  98   105   Calcium 9.0  9.0   8.2 (A)   Albumin   3.70   3.20 (A)   Total Bilirubin   1.2   1.3 (A)   Alkaline Phosphatase   144 (A)   134 (A)   AST (SGOT)   65 (A)   47 (A)   ALT (SGPT)   35   31   WBC  4.09   4.88    Hemoglobin  10.3 (A)  8.2 (A) 8.8 (A)    Hematocrit  31.9 (A)   26.8 (A)    Platelets  82 (A)   69 (A)    (A) Abnormal value       Comments are available for some flowsheets but are not being displayed.             Lab Results   Component Value Date    COVID19 Not Detected 03/08/2022    RSV Not Detected 01/20/2022    INR 1.79 (H) 06/20/2022    BILIRUBINUR Negative 05/24/2022          Assessment and Plan    Diagnoses and all orders for this visit:    1. Liver cirrhosis secondary to ROMO (nonalcoholic steatohepatitis) (HCC) (Primary)  Comments:  check labs. add propranolol to help with varices.   Orders:  -     CBC & Differential  -     TSH  -     propranolol LA (Inderal LA) 60 MG 24 hr capsule; Take 1 capsule by mouth Daily.  Dispense: 90 capsule; Refill: 1    2. Hyperlipidemia, unspecified hyperlipidemia type  Comments:  check lipids. cont statin  Orders:  -     Lipid Panel  -     Comprehensive Metabolic Panel    3. Chronic systolic congestive heart failure (HCC)  Comments:  check BNP. cont diuretics. adding back BB.   Orders:  -     proBNP  -     TSH    4. Thrombocytopenia (HCC)  -     CBC & Differential    5. Diabetes mellitus without complication (HCC)  Comments:  check HgbA1c. decent control based on home BG readings. cont insulin at current dose.   Orders:  -     Hemoglobin A1c  -     Ambulatory Referral for Diabetic Eye Exam-Ophthalmology    6. RLS (restless legs syndrome)  Comments:  avoiding narcotics. will Rx requip to help with nighttime cramps.   Orders:  -     rOPINIRole (Requip)  1 MG tablet; Take 1 tablet by mouth Every Night. Take 1 hour before bedtime.  Dispense: 90 tablet; Refill: 0          Medications Discontinued During This Encounter   Medication Reason   • HYDROcodone-acetaminophen (NORCO) 5-325 MG per tablet *Error   • oxyCODONE (Roxicodone) 5 MG immediate release tablet *Error          Follow Up   Return in about 6 weeks (around 8/19/2022) for Recheck.  Patient was given instructions and counseling regarding his condition or for health maintenance advice. Please see specific information pulled into the AVS if appropriate.       Jonh Franco Jr, MD  07/08/22  09:14 EDT

## 2022-07-12 ENCOUNTER — TELEPHONE (OUTPATIENT)
Dept: INTERNAL MEDICINE | Facility: CLINIC | Age: 57
End: 2022-07-12

## 2022-07-12 ENCOUNTER — PATIENT OUTREACH (OUTPATIENT)
Dept: CASE MANAGEMENT | Facility: OTHER | Age: 57
End: 2022-07-12

## 2022-07-12 DIAGNOSIS — I10 HYPERTENSION, UNSPECIFIED TYPE: ICD-10-CM

## 2022-07-12 DIAGNOSIS — R18.8 CIRRHOSIS OF LIVER WITH ASCITES, UNSPECIFIED HEPATIC CIRRHOSIS TYPE: Primary | ICD-10-CM

## 2022-07-12 DIAGNOSIS — I63.9 CEREBROVASCULAR ACCIDENT (CVA), UNSPECIFIED MECHANISM: ICD-10-CM

## 2022-07-12 DIAGNOSIS — K74.60 CIRRHOSIS OF LIVER WITH ASCITES, UNSPECIFIED HEPATIC CIRRHOSIS TYPE: Primary | ICD-10-CM

## 2022-07-12 PROCEDURE — 99490 CHRNC CARE MGMT STAFF 1ST 20: CPT | Performed by: INTERNAL MEDICINE

## 2022-07-12 NOTE — OUTREACH NOTE
AMBULATORY CASE MANAGEMENT NOTE    Name and Relationship of Patient/Support Person:  -     CCM Interim Update        Per chart review the patients latest lab values were stable per PCP. Office has reached out to the patient to advise on labs. I called the patient and he stated that he was feeling better than he had felt in years. Stated that he is taking all meds as they should be . The patient has meds in a blister pack from pharmacy to help he with taking meds. He stated that he feels self worth and has no more thoughts of harming himself. He stated that he had a lot to live for and was excited about feeling better. Stated that his pain was under control and stated he is still working with [facility] for managing his pain . As care plan states he stated he is keeping better watch and control over his blood sugar . I encouraged him to continue to monitor he stated he checks 3 times a day and more if required. Stated he keeps his blood sugar under 200 now A1c down to 6.3 from 6.4 30 days ago good trend. See care plan for greater details . Patient stated no further needs at this time. Patient stated that his care while in [facility] was second to none as well as the care he received from Dr. Franco and staff!           Education Documentation  No documentation found.        KAYLA NARANJO  Ambulatory Case Management    7/12/2022, 13:10 EDT

## 2022-07-12 NOTE — TELEPHONE ENCOUNTER
----- Message from Jonh Franco Jr., MD sent at 7/11/2022  9:38 PM EDT -----  Labs overall appear stable/improving. Keep up the good work!

## 2022-07-14 ENCOUNTER — TELEPHONE (OUTPATIENT)
Dept: ORTHOPEDIC SURGERY | Facility: CLINIC | Age: 57
End: 2022-07-14

## 2022-07-14 DIAGNOSIS — S42.254D CLOSED NONDISPLACED FRACTURE OF GREATER TUBEROSITY OF RIGHT HUMERUS WITH ROUTINE HEALING, SUBSEQUENT ENCOUNTER: ICD-10-CM

## 2022-07-14 DIAGNOSIS — M25.511 RIGHT SHOULDER PAIN, UNSPECIFIED CHRONICITY: ICD-10-CM

## 2022-07-14 RX ORDER — HYDROCODONE BITARTRATE AND ACETAMINOPHEN 7.5; 325 MG/1; MG/1
1 TABLET ORAL EVERY 6 HOURS PRN
Qty: 28 TABLET | Refills: 0 | Status: SHIPPED | OUTPATIENT
Start: 2022-07-14 | End: 2022-07-25 | Stop reason: SDUPTHER

## 2022-07-14 NOTE — TELEPHONE ENCOUNTER
Caller: Nic Nicolas    Relationship: Self    Best call back number: [phone]    Requested Prescriptions: NORCO 7.5-325 MG  Requested Prescriptions      No prescriptions requested or ordered in this encounter        Pharmacy where request should be sent: [facility] [phone]     Does the patient have less than a 3 day supply:  [] Yes  [x] No      Kristin PICKETT Rep   07/14/22 13:48 EDT

## 2022-07-20 ENCOUNTER — READMISSION MANAGEMENT (OUTPATIENT)
Dept: CALL CENTER | Facility: HOSPITAL | Age: 57
End: 2022-07-20

## 2022-07-20 DIAGNOSIS — K74.60 CIRRHOSIS OF LIVER WITH ASCITES, UNSPECIFIED HEPATIC CIRRHOSIS TYPE: ICD-10-CM

## 2022-07-20 DIAGNOSIS — R18.8 CIRRHOSIS OF LIVER WITH ASCITES, UNSPECIFIED HEPATIC CIRRHOSIS TYPE: ICD-10-CM

## 2022-07-20 DIAGNOSIS — G25.81 RLS (RESTLESS LEGS SYNDROME): ICD-10-CM

## 2022-07-20 RX ORDER — POTASSIUM CHLORIDE 20 MEQ/1
20 TABLET, EXTENDED RELEASE ORAL DAILY
Qty: 30 TABLET | Refills: 0 | Status: SHIPPED | OUTPATIENT
Start: 2022-07-20 | End: 2022-08-02

## 2022-07-20 RX ORDER — ROPINIROLE 1 MG/1
1 TABLET, FILM COATED ORAL NIGHTLY
Qty: 90 TABLET | Refills: 0 | Status: SHIPPED | OUTPATIENT
Start: 2022-07-20 | End: 2022-08-02 | Stop reason: ALTCHOICE

## 2022-07-20 NOTE — OUTREACH NOTE
Medical Week 1 Survey    Flowsheet Row Responses   [facility]   Does the patient have one of the following disease processes/diagnoses(primary or secondary)? Jil NARANJO - Registered Nurse

## 2022-07-20 NOTE — TELEPHONE ENCOUNTER
Antiparkinson Dopaminergic Meds Passed 07/20/2022 03:09 PM   Protocol Details  BP on record    No positive pregnancy test in the past 12 months    Recent or future visit with authorizing provider (12M/90D)    CBC in past 12 months    Serum Creatinine in the past 12 months    AST < 55 OR ALT < 90 in the past 12 months

## 2022-07-20 NOTE — TELEPHONE ENCOUNTER
potassium chloride (K-DUR,KLOR-CON) 20 MEQ CR tablet (06/17/2022)    QTY 30 NO REFILL    PROVIDER PLEASE ADVISE IF REFILL IS APPROPRIATE

## 2022-07-25 ENCOUNTER — TELEPHONE (OUTPATIENT)
Dept: ORTHOPEDIC SURGERY | Facility: CLINIC | Age: 57
End: 2022-07-25

## 2022-07-25 DIAGNOSIS — S42.254D CLOSED NONDISPLACED FRACTURE OF GREATER TUBEROSITY OF RIGHT HUMERUS WITH ROUTINE HEALING, SUBSEQUENT ENCOUNTER: ICD-10-CM

## 2022-07-25 DIAGNOSIS — M25.511 RIGHT SHOULDER PAIN, UNSPECIFIED CHRONICITY: ICD-10-CM

## 2022-07-25 RX ORDER — HYDROCODONE BITARTRATE AND ACETAMINOPHEN 7.5; 325 MG/1; MG/1
1 TABLET ORAL EVERY 6 HOURS PRN
Qty: 28 TABLET | Refills: 0 | Status: SHIPPED | OUTPATIENT
Start: 2022-07-25 | End: 2022-08-02

## 2022-07-25 NOTE — TELEPHONE ENCOUNTER
Caller: Fidencio Nicloli Paz    Relationship: Self    Best call back number: [phone]    Requested Prescriptions:   Requested Prescriptions     Pending Prescriptions Disp Refills   • HYDROcodone-acetaminophen (Norco) 7.5-325 MG per tablet 28 tablet 0     Sig: Take 1 tablet by mouth Every 6 (Six) Hours As Needed for Moderate Pain .        Pharmacy where request should be sent:  [facility]     Additional details provided by patient: ZERO PILLS LEFT- DIDN'T REALIZE HE RAN OUT    Does the patient have less than a 3 day supply:  [x] Yes  [] No    Rain Schumacher   07/25/22 09:54 EDT

## 2022-07-27 ENCOUNTER — NUTRITION (OUTPATIENT)
Dept: NUTRITION | Facility: HOSPITAL | Age: 57
End: 2022-07-27

## 2022-07-27 VITALS — BODY MASS INDEX: 35.08 KG/M2 | WEIGHT: 231.48 LBS | HEIGHT: 68 IN

## 2022-07-27 PROCEDURE — 97802 MEDICAL NUTRITION INDIV IN: CPT | Performed by: DIETITIAN, REGISTERED
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
## 2022-07-30 ENCOUNTER — PATIENT OUTREACH (OUTPATIENT)
Dept: CASE MANAGEMENT | Facility: OTHER | Age: 57
End: 2022-07-30

## 2022-07-30 DIAGNOSIS — I10 HYPERTENSION, UNSPECIFIED TYPE: ICD-10-CM

## 2022-07-30 DIAGNOSIS — I63.9 CEREBROVASCULAR ACCIDENT (CVA), UNSPECIFIED MECHANISM: ICD-10-CM

## 2022-07-30 DIAGNOSIS — R18.8 CIRRHOSIS OF LIVER WITH ASCITES, UNSPECIFIED HEPATIC CIRRHOSIS TYPE: Primary | ICD-10-CM

## 2022-07-30 DIAGNOSIS — K74.60 CIRRHOSIS OF LIVER WITH ASCITES, UNSPECIFIED HEPATIC CIRRHOSIS TYPE: Primary | ICD-10-CM

## 2022-08-01 ENCOUNTER — TELEPHONE (OUTPATIENT)
Dept: INTERNAL MEDICINE | Facility: CLINIC | Age: 57
End: 2022-08-01

## 2022-08-01 NOTE — TELEPHONE ENCOUNTER
ondansetron ODT (ZOFRAN-ODT) 4 MG disintegrating tablet (06/27/2022)    PT(PATIENT) STATES MEDICATION IS NOT HELPING HIM    PT(PATIENT) IS REQUESTING SOMETHING OTC TO TAKE WITH IT    PHARMACY IS REQUESTING EITHER AN INCREASE IN DOSAGE OR SOMETHING ELSE TO HELP WITH NAUSEA     PROVIDER PLEASE ADVISE

## 2022-08-02 ENCOUNTER — OFFICE VISIT (OUTPATIENT)
Dept: INTERNAL MEDICINE | Facility: CLINIC | Age: 57
End: 2022-08-02

## 2022-08-02 VITALS
OXYGEN SATURATION: 98 % | BODY MASS INDEX: 35.13 KG/M2 | WEIGHT: 231.8 LBS | DIASTOLIC BLOOD PRESSURE: 63 MMHG | HEART RATE: 105 BPM | HEIGHT: 68 IN | TEMPERATURE: 98.3 F | SYSTOLIC BLOOD PRESSURE: 164 MMHG

## 2022-08-02 DIAGNOSIS — S42.254D CLOSED NONDISPLACED FRACTURE OF GREATER TUBEROSITY OF RIGHT HUMERUS WITH ROUTINE HEALING, SUBSEQUENT ENCOUNTER: ICD-10-CM

## 2022-08-02 DIAGNOSIS — R11.0 NAUSEA: ICD-10-CM

## 2022-08-02 DIAGNOSIS — K75.81 LIVER CIRRHOSIS SECONDARY TO NASH (NONALCOHOLIC STEATOHEPATITIS): Primary | ICD-10-CM

## 2022-08-02 DIAGNOSIS — M25.511 RIGHT SHOULDER PAIN, UNSPECIFIED CHRONICITY: ICD-10-CM

## 2022-08-02 DIAGNOSIS — R79.89 ABNORMAL THYROID BLOOD TEST: ICD-10-CM

## 2022-08-02 DIAGNOSIS — D61.818 PANCYTOPENIA: ICD-10-CM

## 2022-08-02 DIAGNOSIS — K74.60 CIRRHOSIS OF LIVER WITH ASCITES, UNSPECIFIED HEPATIC CIRRHOSIS TYPE: ICD-10-CM

## 2022-08-02 DIAGNOSIS — E83.51 HYPOCALCEMIA: ICD-10-CM

## 2022-08-02 DIAGNOSIS — I10 PRIMARY HYPERTENSION: ICD-10-CM

## 2022-08-02 DIAGNOSIS — R18.8 CIRRHOSIS OF LIVER WITH ASCITES, UNSPECIFIED HEPATIC CIRRHOSIS TYPE: ICD-10-CM

## 2022-08-02 DIAGNOSIS — K74.60 LIVER CIRRHOSIS SECONDARY TO NASH (NONALCOHOLIC STEATOHEPATITIS): Primary | ICD-10-CM

## 2022-08-02 DIAGNOSIS — G89.4 CHRONIC PAIN SYNDROME: ICD-10-CM

## 2022-08-02 LAB
25(OH)D3 SERPL-MCNC: 23.7 NG/ML (ref 30–100)
ALBUMIN SERPL-MCNC: 3.3 G/DL (ref 3.5–5.2)
ALBUMIN/GLOB SERPL: 1 G/DL
ALP SERPL-CCNC: 118 U/L (ref 39–117)
ALPHA-FETOPROTEIN: 3 NG/ML (ref 0–8.3)
ALT SERPL W P-5'-P-CCNC: 40 U/L (ref 1–41)
ANION GAP SERPL CALCULATED.3IONS-SCNC: 15 MMOL/L (ref 5–15)
AST SERPL-CCNC: 112 U/L (ref 1–40)
BASOPHILS # BLD AUTO: 0.04 10*3/MM3 (ref 0–0.2)
BASOPHILS NFR BLD AUTO: 0.6 % (ref 0–1.5)
BILIRUB SERPL-MCNC: 1.3 MG/DL (ref 0–1.2)
BUN SERPL-MCNC: 26 MG/DL (ref 6–20)
BUN/CREAT SERPL: 12.3 (ref 7–25)
CALCIUM SPEC-SCNC: 8.5 MG/DL (ref 8.6–10.5)
CHLORIDE SERPL-SCNC: 93 MMOL/L (ref 98–107)
CO2 SERPL-SCNC: 16 MMOL/L (ref 22–29)
CREAT SERPL-MCNC: 2.12 MG/DL (ref 0.76–1.27)
DEPRECATED RDW RBC AUTO: 44.1 FL (ref 37–54)
EGFRCR SERPLBLD CKD-EPI 2021: 35.9 ML/MIN/1.73
EOSINOPHIL # BLD AUTO: 0.11 10*3/MM3 (ref 0–0.4)
EOSINOPHIL NFR BLD AUTO: 1.8 % (ref 0.3–6.2)
ERYTHROCYTE [DISTWIDTH] IN BLOOD BY AUTOMATED COUNT: 13.3 % (ref 12.3–15.4)
GLOBULIN UR ELPH-MCNC: 3.4 GM/DL
GLUCOSE SERPL-MCNC: 559 MG/DL (ref 65–99)
HCT VFR BLD AUTO: 31.4 % (ref 37.5–51)
HGB BLD-MCNC: 10.3 G/DL (ref 13–17.7)
IMM GRANULOCYTES # BLD AUTO: 0.04 10*3/MM3 (ref 0–0.05)
IMM GRANULOCYTES NFR BLD AUTO: 0.6 % (ref 0–0.5)
LYMPHOCYTES # BLD AUTO: 0.92 10*3/MM3 (ref 0.7–3.1)
LYMPHOCYTES NFR BLD AUTO: 14.8 % (ref 19.6–45.3)
MCH RBC QN AUTO: 29.9 PG (ref 26.6–33)
MCHC RBC AUTO-ENTMCNC: 32.8 G/DL (ref 31.5–35.7)
MCV RBC AUTO: 91 FL (ref 79–97)
MONOCYTES # BLD AUTO: 0.82 10*3/MM3 (ref 0.1–0.9)
MONOCYTES NFR BLD AUTO: 13.2 % (ref 5–12)
NEUTROPHILS NFR BLD AUTO: 4.29 10*3/MM3 (ref 1.7–7)
NEUTROPHILS NFR BLD AUTO: 69 % (ref 42.7–76)
NRBC BLD AUTO-RTO: 0 /100 WBC (ref 0–0.2)
PLATELET # BLD AUTO: 80 10*3/MM3 (ref 140–450)
PMV BLD AUTO: 12.5 FL (ref 6–12)
POTASSIUM SERPL-SCNC: 5.1 MMOL/L (ref 3.5–5.2)
PROT SERPL-MCNC: 6.7 G/DL (ref 6–8.5)
RBC # BLD AUTO: 3.45 10*6/MM3 (ref 4.14–5.8)
SODIUM SERPL-SCNC: 124 MMOL/L (ref 136–145)
TSH SERPL DL<=0.05 MIU/L-ACNC: 3.17 UIU/ML (ref 0.27–4.2)
WBC NRBC COR # BLD: 6.22 10*3/MM3 (ref 3.4–10.8)

## 2022-08-02 PROCEDURE — 99214 OFFICE O/P EST MOD 30 MIN: CPT | Performed by: INTERNAL MEDICINE

## 2022-08-02 PROCEDURE — 85025 COMPLETE CBC W/AUTO DIFF WBC: CPT | Performed by: INTERNAL MEDICINE

## 2022-08-02 PROCEDURE — 80053 COMPREHEN METABOLIC PANEL: CPT | Performed by: INTERNAL MEDICINE

## 2022-08-02 PROCEDURE — 82105 ALPHA-FETOPROTEIN SERUM: CPT | Performed by: INTERNAL MEDICINE

## 2022-08-02 PROCEDURE — 84443 ASSAY THYROID STIM HORMONE: CPT | Performed by: INTERNAL MEDICINE

## 2022-08-02 PROCEDURE — 82306 VITAMIN D 25 HYDROXY: CPT | Performed by: INTERNAL MEDICINE

## 2022-08-02 RX ORDER — POTASSIUM CHLORIDE 20 MEQ/1
20 TABLET, EXTENDED RELEASE ORAL DAILY
Qty: 90 TABLET | Refills: 0 | Status: SHIPPED | OUTPATIENT
Start: 2022-08-02 | End: 2022-11-14

## 2022-08-02 RX ORDER — PROPRANOLOL HYDROCHLORIDE 120 MG/1
120 CAPSULE, EXTENDED RELEASE ORAL DAILY
Qty: 90 CAPSULE | Refills: 1 | Status: SHIPPED | OUTPATIENT
Start: 2022-08-02 | End: 2022-11-07 | Stop reason: HOSPADM

## 2022-08-02 RX ORDER — ONDANSETRON HYDROCHLORIDE 8 MG/1
8 TABLET, FILM COATED ORAL EVERY 8 HOURS PRN
Qty: 60 TABLET | Refills: 0 | Status: SHIPPED | OUTPATIENT
Start: 2022-08-02 | End: 2022-10-06 | Stop reason: SDUPTHER

## 2022-08-02 NOTE — TELEPHONE ENCOUNTER
Caller: Fidencio Nicloli Paz    Relationship: Self    Best call back number: [phone]    Requested Prescriptions:   Requested Prescriptions     Pending Prescriptions Disp Refills   • HYDROcodone-acetaminophen (NORCO) 7.5-325 MG per tablet [Pharmacy Med Name: HYDROCODON-APAP 7.5-325 7.5-325 Tablet] 28 tablet 0     Sig: TAKE 1 TABLET BY MOUTH EVERY 6 (SIX) HOURS AS NEEDED FOR MODERATE PAIN        Pharmacy where request should be sent: [address], [place] [address] - [phone] [facility] [phone]      Additional details provided by patient: THE PATIENT STATED HIS PAIN MEDICINE WAS NOT SENT TO THE PHARMACY. HE WOULD LIKE A CALL BACK TO CONFIRM THIS HAS BEEN SENT    Does the patient have less than a 3 day supply:  [] Yes  [] No    Kristin Parham Rep   08/02/22 10:42 EDT

## 2022-08-02 NOTE — TELEPHONE ENCOUNTER
Had a long discussion with patient's mother (PoA) and niece. We made it clear that's patient's ESLD and cirrhosis is not curable and that he is not a liver transplant candidate. We advised that having patient's full code would likely not benefit the patient long term. Mother was scared that she would be making a wrong decision by making him a DNR, but agreed that making patient comfortable is her highest priority. She would like the patient to still have EGD if it helps stops the bleeding.    For now, we will keep patient full code until after EGD.    Mart Varghese, DO  PGY2 Internal Medicine  Pager: [phone]     Potassium Supplement Protocol Passed 08/02/2022 10:37 AM   Protocol Details  Normal serum potassium on file in past 12 months    Recent or future appt with prescriber (12MO/90D)     QTY 30 WITH NO REFILL    PROVIDER PLEASE ADVISE

## 2022-08-02 NOTE — PROGRESS NOTES
Chief Complaint  Abdominal Pain, Nausea (Patient states he is wondering if he can get some medication for it /He states that the Zofran 4 mg isn't helping ), Knee Pain (Patient states he fell twice /A week ago /Dr. Ortega is going to look at it next week ), and Rash (All over body /Arms, belly, and legs )    Subjective          Nic Nicolas presents to [facility]  History of Present Illness  Cirrhosis- due for liver ultrasound and AFP. Patient's weight is being maintained.    DM2- patient reports blood glucose avg is about 150. Patient reports taking insulin as prescribed.   Patient reports ongoing cramps in his legs. Patient encinas snot think the requip has helped much.   Patient reports ongoing nausea. Patient reports zofran has not been helping   Patient reports having rash on his legs.     Current Outpatient Medications   Medication Instructions   • albuterol sulfate  (90 Base) MCG/ACT inhaler 2 puffs, Inhalation, Every 4 Hours PRN   • atorvastatin (LIPITOR) 40 mg, Oral, Daily   • busPIRone (BUSPAR) 7.5 MG tablet TAKE ONE TABLET BY MOUTH THREE TIMES A DAY   • cetirizine (ZYRTEC) 10 mg, Oral, Daily   • Diclofenac Sodium (VOLTAREN) 1 % gel gel Apply to affected area as needed for pain   • fluticasone (FLONASE) 50 MCG/ACT nasal spray 1 spray, Nasal, Daily   • fluticasone (FLOVENT HFA) 110 MCG/ACT inhaler 1 puff, Inhalation, 2 Times Daily - RT   • furosemide (LASIX) 40 mg, Oral, 2 Times Daily   • lactulose (CHRONULAC) 20 g, Oral, 3 Times Daily   • Lancets (freestyle) lancets USE TO CHECK BLOOD SUGAR 5 TIMES PER DAY.   • Levemir FlexTouch 10 Units, Subcutaneous, Nightly   • omeprazole (PRILOSEC) 40 mg, Oral, 2 Times Daily   • ondansetron (ZOFRAN) 8 mg, Oral, Every 8 Hours PRN   • potassium chloride (K-DUR,KLOR-CON) 20 MEQ CR tablet 20 mEq, Oral, Daily   • propranolol LA (INDERAL LA) 120 mg, Oral, Daily   • riFAXIMin (XIFAXAN) 550 mg, Oral, Every 12 Hours Scheduled  "  • sertraline (ZOLOFT) 50 MG tablet No dose, route, or frequency recorded.   • spironolactone (ALDACTONE) 200 mg, Oral, Daily       The following portions of the patient's history were reviewed and updated as appropriate: allergies, current medications, past family history, past medical history, past social history, past surgical history, and problem list.    Objective   Vital Signs:   /63 (BP Location: Left arm)   Pulse 105   Temp 98.3 °F (36.8 °C) (Temporal)   Ht 172.7 cm (68\")   Wt 105 kg (231 lb 12.8 oz)   SpO2 98%   BMI 35.25 kg/m²     Wt Readings from Last 3 Encounters:   08/02/22 105 kg (231 lb 12.8 oz)   07/27/22 105 kg (231 lb 7.7 oz)   07/08/22 116 kg (255 lb)     BP Readings from Last 3 Encounters:   08/02/22 164/63   07/08/22 159/83   06/27/22 151/62     Physical Exam   Appearance: No acute distress, well-nourished  Head: normocephalic, atraumatic  Eyes: extraocular movements intact, no scleral icterus, no conjunctival injection  Ears, Nose, and Throat: external ears normal, nares patent, moist mucous membranes  Cardiovascular: regular rate and rhythm. no murmurs, rubs, or gallops. no edema  Respiratory: breathing comfortably, symmetric chest rise, clear to auscultation bilaterally. No wheezes, rales, or rhonchi.  Neuro: alert and oriented to time, place, and person. Normal gait  Psych: normal mood and affect     Result Review :   The following data was reviewed by: Jonh Franco Jr, MD on 08/02/2022:  Common labs    Common Labsle 6/20/22 6/20/22 6/27/22 6/27/22 6/27/22 7/8/22 7/8/22 7/8/22 7/8/22    1652 1652 1351 1449 1449 1707 1707 1707 1707   Glucose  156 (A)   170 (A)    179 (A)   BUN  26 (A)   21 (A)    12   Creatinine  1.18   1.58 (A)    0.81   Sodium  134 (A)   137    137   Potassium  4.6   4.7    4.3   Chloride  98   105    103   Calcium  9.0   8.2 (A)    8.5 (A)   Albumin  3.70   3.20 (A)    3.30 (A)   Total Bilirubin  1.2   1.3 (A)    1.0   Alkaline Phosphatase  144 (A)  "  134 (A)    112   AST (SGOT)  65 (A)   47 (A)    37   ALT (SGPT)  35   31    25   WBC 4.09   4.88  4.76      Hemoglobin 10.3 (A)  8.2 (A) 8.8 (A)  8.7 (A)      Hematocrit 31.9 (A)   26.8 (A)  26.4 (A)      Platelets 82 (A)   69 (A)  94 (A)      Total Cholesterol        117    Triglycerides        31    HDL Cholesterol        59    LDL Cholesterol         49    Hemoglobin A1C       6.30 (A)     (A) Abnormal value              Lab Results   Component Value Date    COVID19 Not Detected 03/08/2022    RSV Not Detected 01/20/2022    INR 1.79 (H) 06/20/2022    BILIRUBINUR Negative 05/24/2022          Assessment and Plan    Diagnoses and all orders for this visit:    1. Liver cirrhosis secondary to ROMO (nonalcoholic steatohepatitis) (HCC) (Primary)  Comments:  due for AFP and liver US. cont diuretics. fluid status seems stable.   Orders:  -     AFP Tumor Marker  -     Comprehensive metabolic panel  -     US Liver; Future    2. Hypocalcemia  -     Vitamin D 25 hydroxy    3. Primary hypertension  Comments:  inc propranolol to help with BP and HR control as well as varices in setting of cirrhosis.   Orders:  -     propranolol LA (Inderal LA) 120 MG 24 hr capsule; Take 1 capsule by mouth Daily.  Dispense: 90 capsule; Refill: 1    4. Abnormal thyroid blood test  Comments:  recheck TSH.   Orders:  -     TSH    5. Nausea  -     ondansetron (Zofran) 8 MG tablet; Take 1 tablet by mouth Every 8 (Eight) Hours As Needed for Nausea or Vomiting.  Dispense: 60 tablet; Refill: 0    6. Pancytopenia (HCC)  -     CBC & Differential    7. Chronic pain syndrome  Comments:  pt clinically looks as good as he ever has since I have been seeing him. Will defer opioid analgesics at this time. pt declines other options.           Medications Discontinued During This Encounter   Medication Reason   • ondansetron ODT (ZOFRAN-ODT) 4 MG disintegrating tablet Alternate therapy   • rOPINIRole (REQUIP) 1 MG tablet Alternate therapy   • propranolol LA  (Inderal LA) 60 MG 24 hr capsule Reorder   • HYDROcodone-acetaminophen (Norco) 7.5-325 MG per tablet *Therapy completed        Follow Up   Return in about 3 months (around 11/2/2022) for Recheck, HTN.  Patient was given instructions and counseling regarding his condition or for health maintenance advice. Please see specific information pulled into the AVS if appropriate.       Jonh Franco Jr, MD  08/02/22  17:24 EDT

## 2022-08-02 NOTE — TELEPHONE ENCOUNTER
Caller: Nic Nicolas Jackson    Relationship: Self    Best call back number: [phone]    Requested Prescriptions:   Requested Prescriptions     Pending Prescriptions Disp Refills   • HYDROcodone-acetaminophen (NORCO) 7.5-325 MG per tablet [Pharmacy Med Name: HYDROCODON-APAP 7.5-325 7.5-325 Tablet] 28 tablet 0     Sig: TAKE 1 TABLET BY MOUTH EVERY 6 (SIX) HOURS AS NEEDED FOR MODERATE PAIN        Pharmacy where request should be sent: [address], [address]ITE [facility] - [phone] [facility] [phone]      Additional details provided by patient: PATIENT IS CALLING TO FIND OUT IF MEDICATION IS READY TO  AS [place] AND THAT IS THE ONLY TIME TODAY, 08/02/2022, THAT HE WILL BE. HE IS REQUESTING CALL BACK TO LET HIM KNOW.      Does the patient have less than a 3 day supply:  [x] Yes  [] No    Kristin Colmenares   08/02/22 12:14 EDT

## 2022-08-02 NOTE — TELEPHONE ENCOUNTER
The original prescription was discontinued on 8/2/2022 by Jonh Franco Jr., MD for the following reason: *Therapy completed. Renewing this prescription may not be appropriate.    PROVIDER PLEASE ADVISE

## 2022-08-03 ENCOUNTER — TELEPHONE (OUTPATIENT)
Dept: INTERNAL MEDICINE | Facility: CLINIC | Age: 57
End: 2022-08-03

## 2022-08-03 ENCOUNTER — TELEPHONE (OUTPATIENT)
Dept: ORTHOPEDIC SURGERY | Facility: CLINIC | Age: 57
End: 2022-08-03

## 2022-08-03 RX ORDER — HYDROCODONE BITARTRATE AND ACETAMINOPHEN 7.5; 325 MG/1; MG/1
1 TABLET ORAL EVERY 6 HOURS PRN
Qty: 28 TABLET | Refills: 0 | Status: SHIPPED | OUTPATIENT
Start: 2022-08-03 | End: 2022-09-08

## 2022-08-03 RX ORDER — ERGOCALCIFEROL 1.25 MG/1
50000 CAPSULE ORAL WEEKLY
Qty: 13 CAPSULE | Refills: 3 | Status: SHIPPED | OUTPATIENT
Start: 2022-08-03 | End: 2022-09-08

## 2022-08-03 RX ORDER — HYDROCODONE BITARTRATE AND ACETAMINOPHEN 7.5; 325 MG/1; MG/1
1 TABLET ORAL EVERY 6 HOURS PRN
Qty: 28 TABLET | Refills: 0 | OUTPATIENT
Start: 2022-08-03

## 2022-08-03 NOTE — TELEPHONE ENCOUNTER
PATIENT IS CALLING AND REQUESTING A REFILL OF PAIN MEDS.  [facility]

## 2022-08-03 NOTE — TELEPHONE ENCOUNTER
ATTEMPTED TO CONTACT PT PER PROVIDER'S INSTRUCTIONS      NO ANSWER      LEFT VOICEMAIL WITH INSTRUCTIONS TO RETURN CALL TO OFFICE AT [phone]     OK FOR HUB TO ADVISE/READ   PER VERBAL DR AVERY, DUE TO RECENT LAB RESULTS PT(PATIENT) NEEDS TO GO TO THE ER

## 2022-08-03 NOTE — TELEPHONE ENCOUNTER
ATTEMPTED TO CONTACT PT PER PROVIDER'S INSTRUCTIONS      NO ANSWER      LEFT VOICEMAIL WITH INSTRUCTIONS TO RETURN CALL TO OFFICE AT [phone]     OK FOR HUB TO ADVISE/READ   Jonh Franco Jr., MD   8/3/2022 11:34 AM EDT Back to Top      Patient needs to go to ER for diabetic ketoacidosis based on these results. I tappears he has been short on insulin. Also need to start vitamin D and I will send Prescription.

## 2022-08-03 NOTE — TELEPHONE ENCOUNTER
----- Message from Jonh Franco Jr., MD sent at 8/3/2022 11:34 AM EDT -----  Patient needs to go to ER for diabetic ketoacidosis based on these results. I tappears he has been short on insulin. Also need to start vitamin D and I will send Prescription.

## 2022-08-03 NOTE — TELEPHONE ENCOUNTER
PT(PATIENT) VERIFIED     PT(PATIENT) STATES HE WAS SUPPOSED TO RECEIVE MEDICATION     PT(PATIENT) STATES HE WAS SUPPOSED TO RECEIVE MORPHINE PILLS OR LIQUID     PT(PATIENT) STATES HE HAS HAD LEG SPASMS, AND IS UNABLE TO WALK     Office Visit with Jonh Franco Jr., MD (2022)    7. Chronic pain syndrome  Comments:  pt clinically looks as good as he ever has since I have been seeing him. Will defer opioid analgesics at this time. pt declines other options.    PT(PATIENT) IS REQUESTING ANOTHER PROVIDER TO SCRIPT HIS NARCOTIC MEDICATION     PT(PATIENT) ADVISED THAT HE OS UNDER THE CARE OF DR FRANCO AND WILL NEED TO DEFER TO DR FRANCO FOR HIS MEDICATIONS    PT(PATIENT) TRANSFERRED TO PRACTICE MANAGER

## 2022-08-04 NOTE — TELEPHONE ENCOUNTER
ATTEMPTED TO CONTACT PT PER PROVIDER'S INSTRUCTIONS      NO ANSWER      LEFT VOICEMAIL WITH INSTRUCTIONS TO RETURN CALL TO OFFICE AT [phone]     OK FOR HUB TO ADVISE/READ   Jonh Franco Jr., MD   8/3/2022 11:34 AM EDT Back to Top      Patient needs to go to ER for diabetic ketoacidosis based on these results. I tappears he has been short on insulin. Also need to start vitamin D and I will send Prescription.

## 2022-08-04 NOTE — TELEPHONE ENCOUNTER
MR. WRIGHT WAS ON THE PHONE WITH SHANIKA ABOUT MEDICATION REFILLS. MA EXPLAINED THAT DR. AVERY HAD DISCUSSED IN HIS APPT THAT HE WOULD NOT BE REFILLING ANYMORE OF THE CONTROLLED MEDICATIONS. HE DID OFFER MR. WRIGHT SOME ALTERNATE MEDICATION UNTIL AND ADVISED PATIENT FOR PAIN MANAGEMENT. PATIENT HAS NOT KEPT THOSE APPOINTMENTS AND A FEW CLINICS HAVE NOW DENIED SCHEDULING. PATIENT DOES NOT TAKE MEDICATIONS PRESCRIBED AS DIRECTED AND OFTEN NON COMPLIANT FOR REFERRALS, AND MEDICATIONS.     MR. WRIGHT WAS VERY UPSET WHEN THE CALL WAS TRANSFERRED TO ME AND AS I PICKED UP HE WAS STILL YELLING AND CURSING. PATIENT STATED HE HAS TO HAVE THAT MEDICATION HE HAS BEEN ON IT FOR 25+ YEARS AND HE KNOWS WHAT HE NEEDS AND WANTS. I EXPLAINED TO HIM I WAS SORRY FOR HIS FRUSTRATION AND EXPLAINED THAT DR. AVERY HAS HAD THE CONVERSATION PRIOR THAT WE WERE NOT ABLE TO REFILL THESE MEDICATIONS LONG TERM AND THAT WHEN HE WAS OFFERED THE ALTERNATE MEDICATION HE DENIED IT. I EXPLAINED THAT WE HAVE CHECKED WITH DR. AVERY AND HE WILL NO LONGER BE REFILLING THIS MEDICATION. PATIENT STARTED TO SCREAM AGAIN, AND THREATENING TO RUIN NOT ONLY DR. AVERY BUT THE PRACTICE TOO AND HE WOULD NOT JUST BE QUIET ABOUT THIS AND THIS IS JUST WRONG. HE STARTED TO CALM DOWN BUT THEN ASKED ME ABOUT MAKING ANOTHER PROVIDER IN CLINIC APPROVE. I EXPLAINED WE WERE NOT ABLE TO DO THAT, AND HE STARTED SCREAMING AGAIN CALLING ALL OF US LIARS. HE SAID HE WOULD CONTACT DR. GÓMEZ AND GET REFILLS AND WE ARE JUST WRONG. PATIENT ENDED THE CALL.     PER PROVIDERS THEY AGREE WITH THE WAY MR. WRIGHT HAS TREATED STAFF, THE NON COMPLIANT AND THREATS THEY WISH TO DISMISS PATIENT. LETTERS TO BE SENT.

## 2022-08-05 NOTE — TELEPHONE ENCOUNTER
ATTEMPTED TO CONTACT PT PER PROVIDER'S INSTRUCTIONS      NO ANSWER      LEFT VOICEMAIL WITH INSTRUCTIONS TO RETURN CALL TO OFFICE AT [phone]     OK FOR HUB TO ADVISE/READ   Jonh Franco Jr., MD   8/3/2022 11:34 AM EDT Back to Top      Patient needs to go to ER for diabetic ketoacidosis based on these results. I tappears he has been short on insulin. Also need to start vitamin D and I will send Prescription.

## 2022-08-08 ENCOUNTER — OFFICE VISIT (OUTPATIENT)
Dept: ORTHOPEDIC SURGERY | Facility: CLINIC | Age: 57
End: 2022-08-08

## 2022-08-08 VITALS — OXYGEN SATURATION: 97 % | HEIGHT: 68 IN | WEIGHT: 231 LBS | HEART RATE: 81 BPM | BODY MASS INDEX: 35.01 KG/M2

## 2022-08-08 DIAGNOSIS — M17.11 PRIMARY OSTEOARTHRITIS OF RIGHT KNEE: ICD-10-CM

## 2022-08-08 DIAGNOSIS — M25.561 RIGHT KNEE PAIN, UNSPECIFIED CHRONICITY: Primary | ICD-10-CM

## 2022-08-08 PROCEDURE — 99213 OFFICE O/P EST LOW 20 MIN: CPT | Performed by: ORTHOPAEDIC SURGERY

## 2022-08-08 PROCEDURE — 20610 DRAIN/INJ JOINT/BURSA W/O US: CPT | Performed by: ORTHOPAEDIC SURGERY

## 2022-08-08 RX ORDER — LIDOCAINE HYDROCHLORIDE 10 MG/ML
9 INJECTION, SOLUTION INFILTRATION; PERINEURAL
Status: COMPLETED | OUTPATIENT
Start: 2022-08-08 | End: 2022-08-08

## 2022-08-08 RX ORDER — TRIAMCINOLONE ACETONIDE 40 MG/ML
40 INJECTION, SUSPENSION INTRA-ARTICULAR; INTRAMUSCULAR
Status: COMPLETED | OUTPATIENT
Start: 2022-08-08 | End: 2022-08-08

## 2022-08-08 RX ADMIN — LIDOCAINE HYDROCHLORIDE 9 ML: 10 INJECTION, SOLUTION INFILTRATION; PERINEURAL at 10:36

## 2022-08-08 RX ADMIN — TRIAMCINOLONE ACETONIDE 40 MG: 40 INJECTION, SUSPENSION INTRA-ARTICULAR; INTRAMUSCULAR at 10:36

## 2022-08-08 NOTE — PROGRESS NOTES
"Chief Complaint  Initial Evaluation of the Right Knee     Subjective      Nic Nicolas presents to [facility] for an evaluation of right knee. He has been having knee pain for 6 weeks. He states it feels like his knee is giving way and feels like it is twisting and turning on him. He denies any injury or trauma to the knee.     No Known Allergies     Social History     Socioeconomic History   • Marital status:    Tobacco Use   • Smoking status: Never Smoker   • Smokeless tobacco: Never Used   Vaping Use   • Vaping Use: Never used   Substance and Sexual Activity   • Alcohol use: Never   • Drug use: Never   • Sexual activity: Defer        Review of Systems     Objective   Vital Signs:   Pulse 81   Ht 172.7 cm (68\")   Wt 105 kg (231 lb)   SpO2 97%   BMI 35.12 kg/m²       Physical Exam  Constitutional:       Appearance: Normal appearance. Patient is well-developed and normal weight.   HENT:      Head: Normocephalic.      Right Ear: Hearing and external ear normal.      Left Ear: Hearing and external ear normal.      Nose: Nose normal.   Eyes:      Conjunctiva/sclera: Conjunctivae normal.   Cardiovascular:      Rate and Rhythm: Normal rate.   Pulmonary:      Effort: Pulmonary effort is normal.      Breath sounds: No wheezing or rales.   Abdominal:      Palpations: Abdomen is soft.      Tenderness: There is no abdominal tenderness.   Musculoskeletal:      Cervical back: Normal range of motion.   Skin:     Findings: No rash.   Neurological:      Mental Status: Patient  is alert and oriented to person, place, and time.   Psychiatric:         Mood and Affect: Mood and affect normal.         Judgment: Judgment normal.       Ortho Exam      RIGHT KNEE: Patient will not allow examination due to pain to touch. Ambulation with a cane. Antalgic gait.     Large Joint Arthrocentesis: R knee  Date/Time: 8/8/2022 10:36 AM  Consent given by: patient  Site marked: site marked  Timeout: " Immediately prior to procedure a time out was called to verify the correct patient, procedure, equipment, support staff and site/side marked as required   Supporting Documentation  Indications: pain   Procedure Details  Location: knee - R knee  Needle size: 22 G  Medications administered: 9 mL lidocaine 1 %; 40 mg triamcinolone acetonide 40 MG/ML  Patient tolerance: patient tolerated the procedure well with no immediate complications              Imaging Results (Most Recent)     Procedure Component Value Units Date/Time    XR Knee 3 View Right [[id number]] Resulted: 08/08/22 1016     Updated: 08/08/22 1020           Result Review :       X-Ray Report:  Right knee(s) X-Ray  Indication: Evaluation of right knee pain   AP, Lateral and Standing view(s)  Findings: No acute fractures or dislocation. Advanced arthritis of the right knee.   Prior studies available for comparison: no     Assessment and Plan     Diagnoses and all orders for this visit:    1. Right knee pain, unspecified chronicity (Primary)  -     XR Knee 3 View Right    2. Primary osteoarthritis of right knee        Discussed injections for the right knee. He tolerated right knee steroid injection well. See how he does with this.     Patient is to follow up with pain management for pain medication, we do no provide pain medication for arthritis.     Call or return if worsening symptoms.    Follow Up     4-6 weeks.       Patient was given instructions and counseling regarding his condition or for health maintenance advice. Please see specific information pulled into the AVS if appropriate.     Scribed for Carl Ortega MD by Bindu Arizmendi.  08/08/22   10:13 EDT    I have personally performed the services described in this document as scribed by the above individual and it is both accurate and complete. Carl Ortega MD 08/08/22

## 2022-08-11 ENCOUNTER — TELEPHONE (OUTPATIENT)
Dept: GASTROENTEROLOGY | Facility: CLINIC | Age: 57
End: 2022-08-11

## 2022-08-11 NOTE — TELEPHONE ENCOUNTER
Called to get patients no show appointment rescheduled. No answer, LVM to call the office to schedule next appointment.

## 2022-08-16 ENCOUNTER — TELEPHONE (OUTPATIENT)
Dept: INTERNAL MEDICINE | Facility: CLINIC | Age: 57
End: 2022-08-16

## 2022-08-16 NOTE — TELEPHONE ENCOUNTER
Caller: Nic Nicolas    Relationship: Self    Best call back number: [phone]    What is the best time to reach you: ANY    Who are you requesting to speak with (clinical staff, provider,  specific staff member): CLINICAL STAFF    What was the call regarding: PATIENT CALLED STATING THAT HE IS EXPERIENCING SEVERE LEG PAIN AND WOULD LIKE TO KNOW WHAT HE SHOULD DO.    Do you require a callback: YES

## 2022-08-17 NOTE — TELEPHONE ENCOUNTER
If it is related to his knee, I would reach out to the orthopedic surgeon for further management. We have talked about several options to help with pain including voltaren gel, lidocaine gel, muscle relaxers, requip/mirapex, magnesium. Would avoid tylenol due to his liver diease as well as NSAIDs such as ibuprofen due to his history of GI bleeds. I know we have talked about opiate pain medications before as well - would recommend consideration for suboxone and methadone instead of oxycodone or hydrocodone as those are processed through the liver.

## 2022-08-19 RX ORDER — BUSPIRONE HYDROCHLORIDE 7.5 MG/1
TABLET ORAL
Qty: 90 TABLET | Refills: 1 | Status: SHIPPED | OUTPATIENT
Start: 2022-08-19 | End: 2022-10-12

## 2022-09-08 ENCOUNTER — HOSPITAL ENCOUNTER (EMERGENCY)
Facility: HOSPITAL | Age: 57
Discharge: HOME | End: 2022-09-08
Attending: STUDENT IN AN ORGANIZED HEALTH CARE EDUCATION/TRAINING PROGRAM | Admitting: STUDENT IN AN ORGANIZED HEALTH CARE EDUCATION/TRAINING PROGRAM

## 2022-09-08 ENCOUNTER — OFFICE VISIT (OUTPATIENT)
Dept: FAMILY MEDICINE CLINIC | Facility: CLINIC | Age: 57
End: 2022-09-08

## 2022-09-08 VITALS
SYSTOLIC BLOOD PRESSURE: 131 MMHG | TEMPERATURE: 98 F | HEART RATE: 76 BPM | OXYGEN SATURATION: 98 % | DIASTOLIC BLOOD PRESSURE: 84 MMHG | RESPIRATION RATE: 20 BRPM

## 2022-09-08 VITALS
RESPIRATION RATE: 18 BRPM | BODY MASS INDEX: 33.45 KG/M2 | HEART RATE: 73 BPM | OXYGEN SATURATION: 99 % | TEMPERATURE: 97.5 F | WEIGHT: 220.7 LBS | HEIGHT: 68 IN | DIASTOLIC BLOOD PRESSURE: 71 MMHG | SYSTOLIC BLOOD PRESSURE: 95 MMHG

## 2022-09-08 DIAGNOSIS — I10 PRIMARY HYPERTENSION: ICD-10-CM

## 2022-09-08 DIAGNOSIS — E66.09 CLASS 1 OBESITY DUE TO EXCESS CALORIES WITH SERIOUS COMORBIDITY AND BODY MASS INDEX (BMI) OF 32.0 TO 32.9 IN ADULT: Chronic | ICD-10-CM

## 2022-09-08 DIAGNOSIS — R73.9 HYPERGLYCEMIA: Primary | ICD-10-CM

## 2022-09-08 DIAGNOSIS — B35.4 TINEA CORPORIS: ICD-10-CM

## 2022-09-08 DIAGNOSIS — R73.9 ELEVATED BLOOD SUGAR: Primary | ICD-10-CM

## 2022-09-08 DIAGNOSIS — E11.9 DIABETES MELLITUS WITHOUT COMPLICATION: ICD-10-CM

## 2022-09-08 PROBLEM — K75.81 LIVER CIRRHOSIS SECONDARY TO NASH (NONALCOHOLIC STEATOHEPATITIS): Chronic | Status: ACTIVE | Noted: 2021-11-02

## 2022-09-08 PROBLEM — D69.6 THROMBOCYTOPENIA: Chronic | Status: RESOLVED | Noted: 2022-02-24 | Resolved: 2022-09-08

## 2022-09-08 PROBLEM — E66.811 CLASS 1 OBESITY DUE TO EXCESS CALORIES WITH SERIOUS COMORBIDITY AND BODY MASS INDEX (BMI) OF 32.0 TO 32.9 IN ADULT: Chronic | Status: ACTIVE | Noted: 2022-09-08

## 2022-09-08 PROBLEM — K74.60 LIVER CIRRHOSIS SECONDARY TO NASH (NONALCOHOLIC STEATOHEPATITIS): Chronic | Status: ACTIVE | Noted: 2021-11-02

## 2022-09-08 LAB
ALBUMIN SERPL-MCNC: 3.6 G/DL (ref 3.5–5.2)
ALBUMIN/GLOB SERPL: 1.1 G/DL
ALP SERPL-CCNC: 115 U/L (ref 39–117)
ALT SERPL W P-5'-P-CCNC: 25 U/L (ref 1–41)
ANION GAP SERPL CALCULATED.3IONS-SCNC: 7.7 MMOL/L (ref 5–15)
ANION GAP SERPL CALCULATED.3IONS-SCNC: 8.2 MMOL/L (ref 5–15)
AST SERPL-CCNC: 27 U/L (ref 1–40)
BASOPHILS # BLD AUTO: 0.08 10*3/MM3 (ref 0–0.2)
BASOPHILS NFR BLD AUTO: 1.1 % (ref 0–1.5)
BILIRUB SERPL-MCNC: 2.2 MG/DL (ref 0–1.2)
BILIRUB UR QL STRIP: NEGATIVE
BUN SERPL-MCNC: 17 MG/DL (ref 6–20)
BUN SERPL-MCNC: 20 MG/DL (ref 6–20)
BUN/CREAT SERPL: 15.4 (ref 7–25)
BUN/CREAT SERPL: 17.3 (ref 7–25)
CALCIUM SPEC-SCNC: 8.3 MG/DL (ref 8.6–10.5)
CALCIUM SPEC-SCNC: 9.1 MG/DL (ref 8.6–10.5)
CHLORIDE SERPL-SCNC: 101 MMOL/L (ref 98–107)
CHLORIDE SERPL-SCNC: 92 MMOL/L (ref 98–107)
CLARITY UR: CLEAR
CO2 SERPL-SCNC: 21.8 MMOL/L (ref 22–29)
CO2 SERPL-SCNC: 24.3 MMOL/L (ref 22–29)
COLOR UR: YELLOW
CREAT SERPL-MCNC: 0.98 MG/DL (ref 0.76–1.27)
CREAT SERPL-MCNC: 1.3 MG/DL (ref 0.76–1.27)
DEPRECATED RDW RBC AUTO: 47 FL (ref 37–54)
EGFRCR SERPLBLD CKD-EPI 2021: 64.5 ML/MIN/1.73
EGFRCR SERPLBLD CKD-EPI 2021: 90.5 ML/MIN/1.73
EOSINOPHIL # BLD AUTO: 0.19 10*3/MM3 (ref 0–0.4)
EOSINOPHIL NFR BLD AUTO: 2.6 % (ref 0.3–6.2)
ERYTHROCYTE [DISTWIDTH] IN BLOOD BY AUTOMATED COUNT: 14.8 % (ref 12.3–15.4)
GLOBULIN UR ELPH-MCNC: 3.2 GM/DL
GLUCOSE BLDC GLUCOMTR-MCNC: 445 MG/DL (ref 70–99)
GLUCOSE BLDC GLUCOMTR-MCNC: 592 MG/DL (ref 70–99)
GLUCOSE SERPL-MCNC: 434 MG/DL (ref 65–99)
GLUCOSE SERPL-MCNC: 644 MG/DL (ref 65–99)
GLUCOSE UR STRIP-MCNC: ABNORMAL MG/DL
HCT VFR BLD AUTO: 34.2 % (ref 37.5–51)
HGB BLD-MCNC: 11.7 G/DL (ref 13–17.7)
HGB UR QL STRIP.AUTO: NEGATIVE
HOLD SPECIMEN: NORMAL
HOLD SPECIMEN: NORMAL
IMM GRANULOCYTES # BLD AUTO: 0.04 10*3/MM3 (ref 0–0.05)
IMM GRANULOCYTES NFR BLD AUTO: 0.5 % (ref 0–0.5)
KETONES UR QL STRIP: NEGATIVE
LEUKOCYTE ESTERASE UR QL STRIP.AUTO: NEGATIVE
LYMPHOCYTES # BLD AUTO: 1.25 10*3/MM3 (ref 0.7–3.1)
LYMPHOCYTES NFR BLD AUTO: 17.2 % (ref 19.6–45.3)
MCH RBC QN AUTO: 29.6 PG (ref 26.6–33)
MCHC RBC AUTO-ENTMCNC: 34.2 G/DL (ref 31.5–35.7)
MCV RBC AUTO: 86.6 FL (ref 79–97)
MONOCYTES # BLD AUTO: 0.6 10*3/MM3 (ref 0.1–0.9)
MONOCYTES NFR BLD AUTO: 8.2 % (ref 5–12)
NEUTROPHILS NFR BLD AUTO: 5.12 10*3/MM3 (ref 1.7–7)
NEUTROPHILS NFR BLD AUTO: 70.4 % (ref 42.7–76)
NITRITE UR QL STRIP: NEGATIVE
NRBC BLD AUTO-RTO: 0 /100 WBC (ref 0–0.2)
PH UR STRIP.AUTO: 6 [PH] (ref 5–8)
PLATELET # BLD AUTO: 107 10*3/MM3 (ref 140–450)
PMV BLD AUTO: 10.8 FL (ref 6–12)
POTASSIUM SERPL-SCNC: 4.6 MMOL/L (ref 3.5–5.2)
POTASSIUM SERPL-SCNC: 5.7 MMOL/L (ref 3.5–5.2)
PROT SERPL-MCNC: 6.8 G/DL (ref 6–8.5)
PROT UR QL STRIP: NEGATIVE
RBC # BLD AUTO: 3.95 10*6/MM3 (ref 4.14–5.8)
SODIUM SERPL-SCNC: 124 MMOL/L (ref 136–145)
SODIUM SERPL-SCNC: 131 MMOL/L (ref 136–145)
SP GR UR STRIP: 1.03 (ref 1–1.03)
UROBILINOGEN UR QL STRIP: ABNORMAL
WBC NRBC COR # BLD: 7.28 10*3/MM3 (ref 3.4–10.8)
WHOLE BLOOD HOLD COAG: NORMAL
WHOLE BLOOD HOLD SPECIMEN: NORMAL

## 2022-09-08 PROCEDURE — 82962 GLUCOSE BLOOD TEST: CPT

## 2022-09-08 PROCEDURE — 96360 HYDRATION IV INFUSION INIT: CPT

## 2022-09-08 PROCEDURE — 80053 COMPREHEN METABOLIC PANEL: CPT

## 2022-09-08 PROCEDURE — 81003 URINALYSIS AUTO W/O SCOPE: CPT | Performed by: STUDENT IN AN ORGANIZED HEALTH CARE EDUCATION/TRAINING PROGRAM

## 2022-09-08 PROCEDURE — 63710000001 INSULIN LISPRO (HUMAN) PER 5 UNITS: Performed by: STUDENT IN AN ORGANIZED HEALTH CARE EDUCATION/TRAINING PROGRAM

## 2022-09-08 PROCEDURE — 36415 COLL VENOUS BLD VENIPUNCTURE: CPT

## 2022-09-08 PROCEDURE — 85025 COMPLETE CBC W/AUTO DIFF WBC: CPT

## 2022-09-08 PROCEDURE — 99284 EMERGENCY DEPT VISIT MOD MDM: CPT

## 2022-09-08 PROCEDURE — 99214 OFFICE O/P EST MOD 30 MIN: CPT | Performed by: FAMILY MEDICINE

## 2022-09-08 RX ORDER — SERTRALINE HYDROCHLORIDE 25 MG/1
25 TABLET, FILM COATED ORAL NIGHTLY
COMMUNITY
Start: 2022-08-19 | End: 2022-10-12

## 2022-09-08 RX ORDER — LACTULOSE 10 G/15ML
20 SOLUTION ORAL; RECTAL 3 TIMES DAILY
COMMUNITY
Start: 2022-08-19 | End: 2022-10-18

## 2022-09-08 RX ORDER — INSULIN LISPRO 100 [IU]/ML
0-7 INJECTION, SOLUTION INTRAVENOUS; SUBCUTANEOUS
COMMUNITY
Start: 2022-08-19 | End: 2023-03-03 | Stop reason: SDUPTHER

## 2022-09-08 RX ORDER — INSULIN LISPRO 100 [IU]/ML
8 INJECTION, SOLUTION INTRAVENOUS; SUBCUTANEOUS ONCE
Status: COMPLETED | OUTPATIENT
Start: 2022-09-08 | End: 2022-09-08

## 2022-09-08 RX ORDER — SODIUM CHLORIDE 0.9 % (FLUSH) 0.9 %
10 SYRINGE (ML) INJECTION AS NEEDED
Status: DISCONTINUED | OUTPATIENT
Start: 2022-09-08 | End: 2022-09-08 | Stop reason: HOSPADM

## 2022-09-08 RX ADMIN — INSULIN LISPRO 8 UNITS: 100 INJECTION, SOLUTION INTRAVENOUS; SUBCUTANEOUS at 16:09

## 2022-09-08 RX ADMIN — SODIUM CHLORIDE 2000 ML: 9 INJECTION, SOLUTION INTRAVENOUS at 16:11

## 2022-09-08 NOTE — ASSESSMENT & PLAN NOTE
Patient's (Body mass index is 33.57 kg/m².) indicates that they are obese (BMI >30) with health conditions that include hypertension, diabetes mellitus and dyslipidemias . Weight is improving with lifestyle modifications. BMI is is above average; BMI management plan is completed. We discussed low calorie, low carb based diet program, portion control and increasing exercise.
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
## 2022-09-08 NOTE — SIGNIFICANT NOTE
09/08/22 2833   Plan   Plan Comments SW met with patient and sister to discuss [facility] services. Pt and sister were agreeable to send referral. SW sent referral to [facility]. SW also discussed linkage to Diabetes management to help with patient education on diabetes diagnosis and how to administer meds appropriately. Sister reports that he has regressed cognitively. Pt reports that he can read and write but he cannot comprehend things like he used to. Pt does show signs of possible mental delay.   Final Discharge Disposition Code 06 - home with home health care

## 2022-09-08 NOTE — ASSESSMENT & PLAN NOTE
The patient's blood sugar today was found to be over 600 in the clinic.  Due to his recent abnormal labs and hyperglycemia it was decided to send the patient to the emergency room for further medical evaluation and management.  He was amendable and had a ride to take him straight there.

## 2022-09-08 NOTE — PROGRESS NOTES
"Chief Complaint  Annual Exam, Nausea, Abdominal Pain, and Balance Issues    Subjective        Nic Nicolas presents to [facility]  He is here today to establish relations at our clinic as a new patient.  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    He does not check his blood sugar on a daily basis.     He is feeling fatigued today. He has labs     The patient has no other complaints today and denies chest pain, shortness of breath, weakness, numbness, nausea, vomiting, diarrhea, dizziness or syncopal event.        Objective   Vital Signs:  BP 95/71 (BP Location: Left arm, Patient Position: Sitting)   Pulse 73   Temp 97.5 °F (36.4 °C)   Resp 18   Ht 172.7 cm (67.99\")   Wt 100 kg (220 lb 11.2 oz)   SpO2 99%   BMI 33.57 kg/m²   Estimated body mass index is 33.57 kg/m² as calculated from the following:    Height as of this encounter: 172.7 cm (67.99\").    Weight as of this encounter: 100 kg (220 lb 11.2 oz).          Physical Exam  Vitals reviewed.   Constitutional:       Appearance: Normal appearance. He is well-developed. He is obese.   HENT:      Head: Normocephalic and atraumatic.      Right Ear: External ear normal.      Left Ear: External ear normal.      Mouth/Throat:      Pharynx: No oropharyngeal exudate.   Eyes:      Conjunctiva/sclera: Conjunctivae normal.      Pupils: Pupils are equal, round, and reactive to light.   Neck:      Vascular: No carotid bruit.   Cardiovascular:      Rate and Rhythm: Normal rate and regular rhythm.      Heart sounds: No murmur heard.    No friction rub. No gallop.   Pulmonary:      Effort: Pulmonary effort is normal.      Breath sounds: Normal breath sounds. No wheezing or rhonchi. "   Abdominal:      General: There is no distension.   Skin:     General: Skin is warm and dry.   Neurological:      Mental Status: He is alert and oriented to person, place, and time.      Cranial Nerves: No cranial nerve deficit.      Motor: No weakness.   Psychiatric:         Mood and Affect: Mood and affect normal.         Behavior: Behavior normal.         Thought Content: Thought content normal.         Judgment: Judgment normal.        Result Review :    CMP    CMP 6/27/22 7/8/22 8/2/22   Glucose 170 (A) 179 (A) 559 (A)   BUN 21 (A) 12 26 (A)   Creatinine 1.58 (A) 0.81 2.12 (A)   Sodium 137 137 124 (A)   Potassium 4.7 4.3 5.1   Chloride 105 103 93 (A)   Calcium 8.2 (A) 8.5 (A) 8.5 (A)   Albumin 3.20 (A) 3.30 (A) 3.30 (A)   Total Bilirubin 1.3 (A) 1.0 1.3 (A)   Alkaline Phosphatase 134 (A) 112 118 (A)   AST (SGOT) 47 (A) 37 112 (A)   ALT (SGPT) 31 25 40   (A) Abnormal value            CBC    CBC 6/27/22 6/27/22 7/8/22 8/2/22    1351 1449     WBC  4.88 4.76 6.22   RBC  2.74 (A) 2.81 (A) 3.45 (A)   Hemoglobin 8.2 (A) 8.8 (A) 8.7 (A) 10.3 (A)   Hematocrit  26.8 (A) 26.4 (A) 31.4 (A)   MCV  97.8 (A) 94.0 91.0   MCH  32.1 31.0 29.9   MCHC  32.8 33.0 32.8   RDW  15.1 13.2 13.3   Platelets  69 (A) 94 (A) 80 (A)   (A) Abnormal value            Lipid Panel    Lipid Panel 1/20/22 4/12/22 7/8/22   Total Cholesterol 96 110 117   Triglycerides 78 69 31   HDL Cholesterol 35 (A) 63 (A) 59   VLDL Cholesterol 16 15 9   LDL Cholesterol  45 32 49   LDL/HDL Ratio 1.30 0.53 0.88   (A) Abnormal value            TSH    TSH 6/20/22 7/8/22 8/2/22   TSH 6.840 (A) 4.450 (A) 3.170   (A) Abnormal value                      Assessment and Plan   Diagnoses and all orders for this visit:    1. Elevated blood sugar (Primary)  Assessment & Plan:  The patient's blood sugar today was found to be over 600 in the clinic.  Due to his recent abnormal labs and hyperglycemia it was decided to send the patient to the emergency room for further medical  evaluation and management.  He was amendable and had a ride to take him straight there.      2. Diabetes mellitus without complication (HCC)  Assessment & Plan:  Diabetes is improving with treatment.   Continue current treatment regimen.  Dietary recommendations for ADA diet.  Diabetes will be reassessed in 3 months.      3. Primary hypertension  Assessment & Plan:  Hypertension is improving with treatment.  Continue current treatment regimen.  Blood pressure will be reassessed at the next regular appointment.      4. Class 1 obesity due to excess calories with serious comorbidity and body mass index (BMI) of 32.0 to 32.9 in adult  Assessment & Plan:  Patient's (Body mass index is 33.57 kg/m².) indicates that they are obese (BMI >30) with health conditions that include hypertension, diabetes mellitus and dyslipidemias . Weight is improving with lifestyle modifications. BMI is is above average; BMI management plan is completed. We discussed low calorie, low carb based diet program, portion control and increasing exercise.              Follow Up   Return in about 1 week (around 9/15/2022).  Patient was given instructions and counseling regarding his condition or for health maintenance advice. Please see specific information pulled into the AVS if appropriate.

## 2022-09-08 NOTE — DISCHARGE INSTRUCTIONS
Based off your pharmacy report you are supposed to be taking Levemir 15 units once a day and the Humalog insulin as a sliding scale throughout the day.  It is very important you take your insulin as prescribed and follow-up with your primary care doctor.  If you have any trouble breathing, vomiting, or other concerning symptoms, return to the emergency department.  You also have a fungal rash which cream has been written for.

## 2022-09-08 NOTE — SIGNIFICANT NOTE
09/08/22 1703   Plan   Final Discharge Disposition Code 06 - home with home health care   Final Note [facility] accepted patient for [facility] services and will plan to follow up with patient and sister to schedule when to come out to the home over the weekend.

## 2022-09-09 ENCOUNTER — TELEPHONE (OUTPATIENT)
Dept: DIABETES SERVICES | Facility: HOSPITAL | Age: 57
End: 2022-09-09

## 2022-09-09 NOTE — TELEPHONE ENCOUNTER
Telephoned patient's sister per request of Elizabeth Cooley .  I explained patient could be seen in outpatient diabetes management department with order from PCP.  I also explained she could attended if patient gave permission.

## 2022-09-13 ENCOUNTER — TELEPHONE (OUTPATIENT)
Dept: FAMILY MEDICINE CLINIC | Facility: CLINIC | Age: 57
End: 2022-09-13

## 2022-09-13 DIAGNOSIS — E11.9 DIABETES MELLITUS WITHOUT COMPLICATION: ICD-10-CM

## 2022-09-13 NOTE — TELEPHONE ENCOUNTER
Needs better sugar control. Talked to home health today. His sugar was 400+. He was going to give 10 units short acting insulin and told to start Levemir BID instead of once per day.

## 2022-09-13 NOTE — TELEPHONE ENCOUNTER
Caller: DG MURPHY    Relationship to patient: Emergency Contact    Best call back number: [phone] OKAY TO LEAVE MESSAGE PER SISTER    Patient is needing: PATIENT'S SISTER CALLED IN AND IS TRYING TO GET PATIENT A HOSPITAL FOLLOW UP VISIT AS SOON AS POSSIBLE. NONE AVAILABLE FOR HUB TO SCHEDULE UNTIL October. PLEASE CALL AND ADVISE.

## 2022-09-16 ENCOUNTER — TELEPHONE (OUTPATIENT)
Dept: FAMILY MEDICINE CLINIC | Facility: CLINIC | Age: 57
End: 2022-09-16

## 2022-09-16 NOTE — TELEPHONE ENCOUNTER
Caller: [facility]      Best call back number: [phone]    Patient is needing: [facility] CALLED STATING THAT PATIENT'S BLOOD SUGAR IS  393. PATIENT WAS GIVEN  12 UNITS NOW BLOOD SUGAR IS  390. [facility] STATES THE PATIENT SAID HE TOOK INSULIN ABOUT 11 AM AS WELL.     ORDER SAYS TO CALL THE OFFICE IF PATIENT'S  SUGAR IS OVER 350

## 2022-09-16 NOTE — TELEPHONE ENCOUNTER
I would increase the basel insulin 2 units every morning sugar is above 250 and 2 units every other day blood sugar is above 120 fasting.

## 2022-09-28 ENCOUNTER — OFFICE VISIT (OUTPATIENT)
Dept: DIABETES SERVICES | Facility: HOSPITAL | Age: 57
End: 2022-09-28

## 2022-09-28 VITALS
DIASTOLIC BLOOD PRESSURE: 81 MMHG | HEART RATE: 88 BPM | SYSTOLIC BLOOD PRESSURE: 187 MMHG | HEIGHT: 68 IN | WEIGHT: 238.98 LBS | OXYGEN SATURATION: 97 % | BODY MASS INDEX: 36.22 KG/M2 | TEMPERATURE: 98.1 F

## 2022-09-28 DIAGNOSIS — E11.40 TYPE 2 DIABETES MELLITUS WITH DIABETIC NEUROPATHY, WITH LONG-TERM CURRENT USE OF INSULIN: ICD-10-CM

## 2022-09-28 DIAGNOSIS — E66.01 SEVERE OBESITY (BMI 35.0-39.9) WITH COMORBIDITY: ICD-10-CM

## 2022-09-28 DIAGNOSIS — Z79.4 TYPE 2 DIABETES MELLITUS WITH DIABETIC NEUROPATHY, WITH LONG-TERM CURRENT USE OF INSULIN: ICD-10-CM

## 2022-09-28 DIAGNOSIS — E11.65 UNCONTROLLED TYPE 2 DIABETES MELLITUS WITH HYPERGLYCEMIA: Primary | ICD-10-CM

## 2022-09-28 LAB
EXPIRATION DATE: ABNORMAL
GLUCOSE BLDC GLUCOMTR-MCNC: 222 MG/DL (ref 70–99)
HBA1C MFR BLD: 11 %
Lab: ABNORMAL

## 2022-09-28 PROCEDURE — G0463 HOSPITAL OUTPT CLINIC VISIT: HCPCS | Performed by: NURSE PRACTITIONER

## 2022-09-28 PROCEDURE — 82962 GLUCOSE BLOOD TEST: CPT | Performed by: NURSE PRACTITIONER

## 2022-09-28 PROCEDURE — 83036 HEMOGLOBIN GLYCOSYLATED A1C: CPT | Performed by: NURSE PRACTITIONER

## 2022-09-28 PROCEDURE — 99204 OFFICE O/P NEW MOD 45 MIN: CPT | Performed by: NURSE PRACTITIONER

## 2022-10-06 ENCOUNTER — OFFICE VISIT (OUTPATIENT)
Dept: FAMILY MEDICINE CLINIC | Facility: CLINIC | Age: 57
End: 2022-10-06

## 2022-10-06 ENCOUNTER — LAB (OUTPATIENT)
Dept: LAB | Facility: HOSPITAL | Age: 57
End: 2022-10-06

## 2022-10-06 VITALS
WEIGHT: 247.6 LBS | HEIGHT: 68 IN | OXYGEN SATURATION: 100 % | HEART RATE: 75 BPM | SYSTOLIC BLOOD PRESSURE: 114 MMHG | BODY MASS INDEX: 37.53 KG/M2 | DIASTOLIC BLOOD PRESSURE: 81 MMHG | RESPIRATION RATE: 20 BRPM | TEMPERATURE: 98.1 F

## 2022-10-06 DIAGNOSIS — R11.0 NAUSEA: ICD-10-CM

## 2022-10-06 DIAGNOSIS — E66.01 CLASS 2 SEVERE OBESITY DUE TO EXCESS CALORIES WITH SERIOUS COMORBIDITY AND BODY MASS INDEX (BMI) OF 38.0 TO 38.9 IN ADULT: Chronic | ICD-10-CM

## 2022-10-06 DIAGNOSIS — E11.9 DIABETES MELLITUS WITHOUT COMPLICATION: Primary | Chronic | ICD-10-CM

## 2022-10-06 DIAGNOSIS — D64.89 ANEMIA DUE TO OTHER CAUSE, NOT CLASSIFIED: ICD-10-CM

## 2022-10-06 DIAGNOSIS — R31.0 GROSS HEMATURIA: ICD-10-CM

## 2022-10-06 DIAGNOSIS — E11.9 DIABETES MELLITUS WITHOUT COMPLICATION: Chronic | ICD-10-CM

## 2022-10-06 DIAGNOSIS — Z23 NEED FOR INFLUENZA VACCINATION: ICD-10-CM

## 2022-10-06 DIAGNOSIS — K75.81 LIVER CIRRHOSIS SECONDARY TO NASH (NONALCOHOLIC STEATOHEPATITIS): Chronic | ICD-10-CM

## 2022-10-06 DIAGNOSIS — K74.60 LIVER CIRRHOSIS SECONDARY TO NASH (NONALCOHOLIC STEATOHEPATITIS): Chronic | ICD-10-CM

## 2022-10-06 PROBLEM — E66.812 CLASS 2 SEVERE OBESITY DUE TO EXCESS CALORIES WITH SERIOUS COMORBIDITY AND BODY MASS INDEX (BMI) OF 38.0 TO 38.9 IN ADULT: Chronic | Status: RESOLVED | Noted: 2022-09-08 | Resolved: 2022-10-06

## 2022-10-06 PROBLEM — E66.812 CLASS 2 SEVERE OBESITY DUE TO EXCESS CALORIES WITH SERIOUS COMORBIDITY AND BODY MASS INDEX (BMI) OF 38.0 TO 38.9 IN ADULT: Chronic | Status: ACTIVE | Noted: 2022-09-08

## 2022-10-06 PROBLEM — E66.811 CLASS 1 OBESITY DUE TO EXCESS CALORIES WITH SERIOUS COMORBIDITY AND BODY MASS INDEX (BMI) OF 32.0 TO 32.9 IN ADULT: Chronic | Status: RESOLVED | Noted: 2022-09-08 | Resolved: 2022-10-06

## 2022-10-06 PROBLEM — E66.812 CLASS 2 SEVERE OBESITY DUE TO EXCESS CALORIES WITH SERIOUS COMORBIDITY AND BODY MASS INDEX (BMI) OF 38.0 TO 38.9 IN ADULT: Status: ACTIVE | Noted: 2022-09-08

## 2022-10-06 PROBLEM — E66.09 CLASS 1 OBESITY DUE TO EXCESS CALORIES WITH SERIOUS COMORBIDITY AND BODY MASS INDEX (BMI) OF 32.0 TO 32.9 IN ADULT: Chronic | Status: RESOLVED | Noted: 2022-09-08 | Resolved: 2022-10-06

## 2022-10-06 LAB
BACTERIA UR QL AUTO: NORMAL /HPF
BASOPHILS # BLD AUTO: 0.04 10*3/MM3 (ref 0–0.2)
BASOPHILS NFR BLD AUTO: 0.8 % (ref 0–1.5)
BILIRUB UR QL STRIP: NEGATIVE
CLARITY UR: CLEAR
COLOR UR: YELLOW
DEPRECATED RDW RBC AUTO: 52.1 FL (ref 37–54)
EOSINOPHIL # BLD AUTO: 0.13 10*3/MM3 (ref 0–0.4)
EOSINOPHIL NFR BLD AUTO: 2.6 % (ref 0.3–6.2)
ERYTHROCYTE [DISTWIDTH] IN BLOOD BY AUTOMATED COUNT: 15.3 % (ref 12.3–15.4)
FERRITIN SERPL-MCNC: 52.2 NG/ML (ref 30–400)
FOLATE SERPL-MCNC: 16.2 NG/ML (ref 4.78–24.2)
GLUCOSE UR STRIP-MCNC: ABNORMAL MG/DL
HCT VFR BLD AUTO: 29.6 % (ref 37.5–51)
HGB BLD-MCNC: 9.6 G/DL (ref 13–17.7)
HGB UR QL STRIP.AUTO: NEGATIVE
HYALINE CASTS UR QL AUTO: NORMAL /LPF
IMM GRANULOCYTES # BLD AUTO: 0.02 10*3/MM3 (ref 0–0.05)
IMM GRANULOCYTES NFR BLD AUTO: 0.4 % (ref 0–0.5)
IRON 24H UR-MRATE: 91 MCG/DL (ref 59–158)
IRON SATN MFR SERPL: 23 % (ref 20–50)
KETONES UR QL STRIP: NEGATIVE
LEUKOCYTE ESTERASE UR QL STRIP.AUTO: NEGATIVE
LYMPHOCYTES # BLD AUTO: 1.03 10*3/MM3 (ref 0.7–3.1)
LYMPHOCYTES NFR BLD AUTO: 20.4 % (ref 19.6–45.3)
MCH RBC QN AUTO: 30.5 PG (ref 26.6–33)
MCHC RBC AUTO-ENTMCNC: 32.4 G/DL (ref 31.5–35.7)
MCV RBC AUTO: 94 FL (ref 79–97)
MONOCYTES # BLD AUTO: 0.5 10*3/MM3 (ref 0.1–0.9)
MONOCYTES NFR BLD AUTO: 9.9 % (ref 5–12)
NEUTROPHILS NFR BLD AUTO: 3.32 10*3/MM3 (ref 1.7–7)
NEUTROPHILS NFR BLD AUTO: 65.9 % (ref 42.7–76)
NITRITE UR QL STRIP: NEGATIVE
NRBC BLD AUTO-RTO: 0 /100 WBC (ref 0–0.2)
PH UR STRIP.AUTO: 6 [PH] (ref 5–8)
PLATELET # BLD AUTO: 84 10*3/MM3 (ref 140–450)
PMV BLD AUTO: 10.9 FL (ref 6–12)
PROT UR QL STRIP: NEGATIVE
RBC # BLD AUTO: 3.15 10*6/MM3 (ref 4.14–5.8)
RBC # UR STRIP: NORMAL /HPF
REF LAB TEST METHOD: NORMAL
SP GR UR STRIP: 1.01 (ref 1–1.03)
SQUAMOUS #/AREA URNS HPF: NORMAL /HPF
TIBC SERPL-MCNC: 399 MCG/DL (ref 298–536)
TRANSFERRIN SERPL-MCNC: 268 MG/DL (ref 200–360)
UROBILINOGEN UR QL STRIP: ABNORMAL
VIT B12 BLD-MCNC: 696 PG/ML (ref 211–946)
WBC # UR STRIP: NORMAL /HPF
WBC NRBC COR # BLD: 5.04 10*3/MM3 (ref 3.4–10.8)

## 2022-10-06 PROCEDURE — 84466 ASSAY OF TRANSFERRIN: CPT

## 2022-10-06 PROCEDURE — 36415 COLL VENOUS BLD VENIPUNCTURE: CPT

## 2022-10-06 PROCEDURE — 82607 VITAMIN B-12: CPT

## 2022-10-06 PROCEDURE — 87086 URINE CULTURE/COLONY COUNT: CPT

## 2022-10-06 PROCEDURE — 82728 ASSAY OF FERRITIN: CPT

## 2022-10-06 PROCEDURE — 83540 ASSAY OF IRON: CPT

## 2022-10-06 PROCEDURE — 99214 OFFICE O/P EST MOD 30 MIN: CPT | Performed by: FAMILY MEDICINE

## 2022-10-06 PROCEDURE — 90686 IIV4 VACC NO PRSV 0.5 ML IM: CPT | Performed by: FAMILY MEDICINE

## 2022-10-06 PROCEDURE — 82746 ASSAY OF FOLIC ACID SERUM: CPT

## 2022-10-06 PROCEDURE — 81001 URINALYSIS AUTO W/SCOPE: CPT

## 2022-10-06 PROCEDURE — 85025 COMPLETE CBC W/AUTO DIFF WBC: CPT

## 2022-10-06 PROCEDURE — 90471 IMMUNIZATION ADMIN: CPT | Performed by: FAMILY MEDICINE

## 2022-10-06 PROCEDURE — 80053 COMPREHEN METABOLIC PANEL: CPT

## 2022-10-06 RX ORDER — ONDANSETRON HYDROCHLORIDE 8 MG/1
8 TABLET, FILM COATED ORAL EVERY 8 HOURS PRN
Qty: 60 TABLET | Refills: 0 | Status: SHIPPED | OUTPATIENT
Start: 2022-10-06 | End: 2022-11-04

## 2022-10-06 RX ORDER — ROPINIROLE 1 MG/1
1 TABLET, FILM COATED ORAL NIGHTLY
COMMUNITY
Start: 2022-09-19 | End: 2022-12-23

## 2022-10-06 RX ORDER — MAGNESIUM OXIDE 400 MG/1
400 TABLET ORAL DAILY
Qty: 30 TABLET | Refills: 5 | Status: SHIPPED | OUTPATIENT
Start: 2022-10-06 | End: 2022-11-07 | Stop reason: HOSPADM

## 2022-10-06 RX ORDER — ERGOCALCIFEROL 1.25 MG/1
50000 CAPSULE ORAL WEEKLY
COMMUNITY
Start: 2022-09-16 | End: 2022-10-27

## 2022-10-06 NOTE — ASSESSMENT & PLAN NOTE
Patient's (Body mass index is 37.65 kg/m².) indicates that they are morbidly obese (BMI > 40 or > 35 with obesity - related health condition) with health conditions that include hypertension, diabetes mellitus and dyslipidemias . Weight is improving with lifestyle modifications. BMI is is above average; BMI management plan is completed. We discussed low calorie, low carb based diet program, portion control and increasing exercise.

## 2022-10-06 NOTE — PROGRESS NOTES
"Chief Complaint  Diabetes (Wants to discuss a freestyle kavita) and Med Refill    Subjective        Nic Nicolas presents to [facility]  History of Present Illness  He is here today for management of his chronic medical conditions.  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     He does not check his blood sugar on a daily basis. He has been taking his medications as prescribed.      The patient has no other complaints today and denies chest pain, shortness of breath, weakness, numbness, nausea, vomiting, diarrhea, dizziness or syncopal event.      Objective   Vital Signs:  /81   Pulse 75   Temp 98.1 °F (36.7 °C)   Resp 20   Ht 172.7 cm (68\")   Wt 112 kg (247 lb 9.6 oz)   SpO2 100%   BMI 37.65 kg/m²   Estimated body mass index is 37.65 kg/m² as calculated from the following:    Height as of this encounter: 172.7 cm (68\").    Weight as of this encounter: 112 kg (247 lb 9.6 oz).          Physical Exam  Vitals reviewed.   Constitutional:       Appearance: Normal appearance. He is well-developed. He is morbidly obese.   HENT:      Head: Normocephalic and atraumatic.      Right Ear: External ear normal.      Left Ear: External ear normal.      Mouth/Throat:      Pharynx: No oropharyngeal exudate.   Eyes:      Conjunctiva/sclera: Conjunctivae normal.      Pupils: Pupils are equal, round, and reactive to light.   Neck:      Vascular: No carotid bruit.   Cardiovascular:      Rate and Rhythm: Normal rate and regular rhythm.      Heart sounds: No murmur heard.    No friction rub. No gallop.   Pulmonary:      Effort: Pulmonary effort is normal.      Breath sounds: Normal breath sounds. No wheezing or rhonchi.   Abdominal:      General: " There is no distension.   Skin:     General: Skin is warm and dry.   Neurological:      Mental Status: He is alert and oriented to person, place, and time.      Cranial Nerves: No cranial nerve deficit.      Motor: No weakness.   Psychiatric:         Mood and Affect: Mood and affect normal.         Behavior: Behavior normal.         Thought Content: Thought content normal.         Judgment: Judgment normal.        Result Review :    CMP    CMP 8/2/22 9/8/22 9/8/22 10/6/22     1428 1816    Glucose 559 (A) 644 (A) 434 (A) 219 (A)   BUN 26 (A) 20 17 20   Creatinine 2.12 (A) 1.30 (A) 0.98 1.12   Sodium 124 (A) 124 (A) 131 (A) 135 (A)   Potassium 5.1 5.7 (A) 4.6 4.6   Chloride 93 (A) 92 (A) 101 104   Calcium 8.5 (A) 9.1 8.3 (A) 8.8   Albumin 3.30 (A) 3.60  3.40 (A)   Total Bilirubin 1.3 (A) 2.2 (A)  1.3 (A)   Alkaline Phosphatase 118 (A) 115  102   AST (SGOT) 112 (A) 27  36   ALT (SGPT) 40 25  24   (A) Abnormal value            CBC    CBC 8/2/22 9/8/22 10/6/22   WBC 6.22 7.28 5.04   RBC 3.45 (A) 3.95 (A) 3.15 (A)   Hemoglobin 10.3 (A) 11.7 (A) 9.6 (A)   Hematocrit 31.4 (A) 34.2 (A) 29.6 (A)   MCV 91.0 86.6 94.0   MCH 29.9 29.6 30.5   MCHC 32.8 34.2 32.4   RDW 13.3 14.8 15.3   Platelets 80 (A) 107 (A) 84 (A)   (A) Abnormal value            Lipid Panel    Lipid Panel 1/20/22 4/12/22 7/8/22   Total Cholesterol 96 110 117   Triglycerides 78 69 31   HDL Cholesterol 35 (A) 63 (A) 59   VLDL Cholesterol 16 15 9   LDL Cholesterol  45 32 49   LDL/HDL Ratio 1.30 0.53 0.88   (A) Abnormal value            TSH    TSH 6/20/22 7/8/22 8/2/22   TSH 6.840 (A) 4.450 (A) 3.170   (A) Abnormal value                      Assessment and Plan   Diagnoses and all orders for this visit:    1. Diabetes mellitus without complication (HCC) (Primary)  Assessment & Plan:  Diabetes is improving with treatment.   Continue current treatment regimen.  Dietary recommendations for ADA diet.  Diabetes will be reassessed in 3 months.    Orders:  -      Comprehensive metabolic panel; Future    2. Nausea  -     ondansetron (Zofran) 8 MG tablet; Take 1 tablet by mouth Every 8 (Eight) Hours As Needed for Nausea or Vomiting.  Dispense: 60 tablet; Refill: 0    3. Need for influenza vaccination  -     FluLaval/Fluarix/Fluzone >6 Months    4. Liver cirrhosis secondary to ROMO (nonalcoholic steatohepatitis) ([facility])  Assessment & Plan:  The patient has been encouraged to watch his Tylenol intake and to avoid alcohol and to lose weight.  We will continue these suggestions moving forward.      5. Class 2 severe obesity due to excess calories with serious comorbidity and body mass index (BMI) of 38.0 to 38.9 in adult ([facility])  Assessment & Plan:  Patient's (Body mass index is 37.65 kg/m².) indicates that they are morbidly obese (BMI > 40 or > 35 with obesity - related health condition) with health conditions that include hypertension, diabetes mellitus and dyslipidemias . Weight is improving with lifestyle modifications. BMI is is above average; BMI management plan is completed. We discussed low calorie, low carb based diet program, portion control and increasing exercise.       6. Gross hematuria  -     Urinalysis With Microscopic - Urine, Clean Catch; Future  -     Urine Culture - Urine, Urine, Clean Catch; Future    7. Anemia due to other cause, not classified  -     CBC w AUTO Differential; Future  -     Iron and TIBC; Future  -     Ferritin; Future  -     Vitamin B12; Future  -     Folate; Future    Other orders  -     polyethyl glycol-propyl glycol (SYSTANE) 0.4-0.3 % solution ophthalmic solution (artificial tears); Administer 2 drops to both eyes Every 1 (One) Hour As Needed (prn in both eyes).  Dispense: 30 mL; Refill: 2  -     magnesium oxide (MAG-OX) 400 MG tablet; Take 1 tablet by mouth Daily.  Dispense: 30 tablet; Refill: 5           Follow Up   Return in about 2 weeks (around 10/20/2022).  Patient was given instructions and counseling regarding his condition or for  health maintenance advice. Please see specific information pulled into the AVS if appropriate.

## 2022-10-07 LAB
ALBUMIN SERPL-MCNC: 3.4 G/DL (ref 3.5–5.2)
ALBUMIN/GLOB SERPL: 1.4 G/DL
ALP SERPL-CCNC: 102 U/L (ref 39–117)
ALT SERPL W P-5'-P-CCNC: 24 U/L (ref 1–41)
ANION GAP SERPL CALCULATED.3IONS-SCNC: 8.4 MMOL/L (ref 5–15)
AST SERPL-CCNC: 36 U/L (ref 1–40)
BACTERIA SPEC AEROBE CULT: NO GROWTH
BILIRUB SERPL-MCNC: 1.3 MG/DL (ref 0–1.2)
BUN SERPL-MCNC: 20 MG/DL (ref 6–20)
BUN/CREAT SERPL: 17.9 (ref 7–25)
CALCIUM SPEC-SCNC: 8.8 MG/DL (ref 8.6–10.5)
CHLORIDE SERPL-SCNC: 104 MMOL/L (ref 98–107)
CO2 SERPL-SCNC: 22.6 MMOL/L (ref 22–29)
CREAT SERPL-MCNC: 1.12 MG/DL (ref 0.76–1.27)
EGFRCR SERPLBLD CKD-EPI 2021: 77.1 ML/MIN/1.73
GLOBULIN UR ELPH-MCNC: 2.4 GM/DL
GLUCOSE SERPL-MCNC: 219 MG/DL (ref 65–99)
POTASSIUM SERPL-SCNC: 4.6 MMOL/L (ref 3.5–5.2)
PROT SERPL-MCNC: 5.8 G/DL (ref 6–8.5)
SODIUM SERPL-SCNC: 135 MMOL/L (ref 136–145)

## 2022-10-09 DIAGNOSIS — D69.6 THROMBOCYTOPENIA: ICD-10-CM

## 2022-10-09 DIAGNOSIS — D64.9 ANEMIA, UNSPECIFIED TYPE: Primary | ICD-10-CM

## 2022-10-12 RX ORDER — SERTRALINE HYDROCHLORIDE 25 MG/1
TABLET, FILM COATED ORAL
Qty: 30 TABLET | Refills: 5 | Status: SHIPPED | OUTPATIENT
Start: 2022-10-12 | End: 2023-01-20 | Stop reason: SDUPTHER

## 2022-10-12 RX ORDER — BUSPIRONE HYDROCHLORIDE 7.5 MG/1
TABLET ORAL
Qty: 90 TABLET | Refills: 1 | Status: SHIPPED | OUTPATIENT
Start: 2022-10-12 | End: 2022-12-08

## 2022-10-18 ENCOUNTER — TELEPHONE (OUTPATIENT)
Dept: FAMILY MEDICINE CLINIC | Facility: CLINIC | Age: 57
End: 2022-10-18

## 2022-10-18 ENCOUNTER — LAB (OUTPATIENT)
Dept: ONCOLOGY | Facility: HOSPITAL | Age: 57
End: 2022-10-18

## 2022-10-18 ENCOUNTER — CONSULT (OUTPATIENT)
Dept: ONCOLOGY | Facility: HOSPITAL | Age: 57
End: 2022-10-18

## 2022-10-18 VITALS
BODY MASS INDEX: 35.6 KG/M2 | RESPIRATION RATE: 18 BRPM | OXYGEN SATURATION: 99 % | TEMPERATURE: 97.4 F | HEART RATE: 59 BPM | WEIGHT: 234.13 LBS | DIASTOLIC BLOOD PRESSURE: 66 MMHG | SYSTOLIC BLOOD PRESSURE: 142 MMHG

## 2022-10-18 DIAGNOSIS — R31.9 HEMATURIA, UNSPECIFIED TYPE: ICD-10-CM

## 2022-10-18 DIAGNOSIS — R11.0 NAUSEA: ICD-10-CM

## 2022-10-18 DIAGNOSIS — D64.9 ANEMIA, UNSPECIFIED TYPE: Primary | ICD-10-CM

## 2022-10-18 LAB
BACTERIA UR QL AUTO: ABNORMAL /HPF
BILIRUB UR QL STRIP: NEGATIVE
CLARITY UR: CLEAR
COLOR UR: YELLOW
GLUCOSE UR STRIP-MCNC: ABNORMAL MG/DL
HGB UR QL STRIP.AUTO: NEGATIVE
HYALINE CASTS UR QL AUTO: ABNORMAL /LPF
KETONES UR QL STRIP: NEGATIVE
LEUKOCYTE ESTERASE UR QL STRIP.AUTO: NEGATIVE
NITRITE UR QL STRIP: NEGATIVE
PH UR STRIP.AUTO: 6 [PH] (ref 5–8)
PROT UR QL STRIP: NEGATIVE
RBC # UR STRIP: ABNORMAL /HPF
REF LAB TEST METHOD: ABNORMAL
SP GR UR STRIP: 1.01 (ref 1–1.03)
SQUAMOUS #/AREA URNS HPF: ABNORMAL /HPF
UROBILINOGEN UR QL STRIP: ABNORMAL
WBC # UR STRIP: ABNORMAL /HPF

## 2022-10-18 PROCEDURE — G0463 HOSPITAL OUTPT CLINIC VISIT: HCPCS

## 2022-10-18 PROCEDURE — 99204 OFFICE O/P NEW MOD 45 MIN: CPT | Performed by: INTERNAL MEDICINE

## 2022-10-18 PROCEDURE — 81001 URINALYSIS AUTO W/SCOPE: CPT

## 2022-10-18 RX ORDER — LACTULOSE 10 G/15ML
SOLUTION ORAL; RECTAL
Qty: 1890 ML | Refills: 3 | Status: ON HOLD | OUTPATIENT
Start: 2022-10-18 | End: 2022-12-22 | Stop reason: SDUPTHER

## 2022-10-18 RX ORDER — PROCHLORPERAZINE MALEATE 10 MG
10 TABLET ORAL EVERY 6 HOURS PRN
Qty: 60 TABLET | Refills: 1 | Status: SHIPPED | OUTPATIENT
Start: 2022-10-18 | End: 2022-11-07 | Stop reason: HOSPADM

## 2022-10-18 RX ORDER — PEN NEEDLE, DIABETIC 32GX 5/32"
NEEDLE, DISPOSABLE MISCELLANEOUS
Qty: 100 EACH | Refills: 0 | Status: SHIPPED | OUTPATIENT
Start: 2022-10-18 | End: 2022-11-14

## 2022-10-18 RX ORDER — LANOLIN ALCOHOL/MO/W.PET/CERES
CREAM (GRAM) TOPICAL
COMMUNITY
Start: 2022-10-06 | End: 2022-10-27

## 2022-10-18 NOTE — TELEPHONE ENCOUNTER
Caller: RENETTA    Relationship: Home Health    Best call back number: [phone]    What is the best time to reach you: ANYTIME    Who are you requesting to speak with (clinical staff, provider,  specific staff member): NURSE     What was the call regarding: RENETTA FROM [facility] CALLED AND STATED THAT PATIENT HAD A FALL EITHER Friday OR Saturday HE COULD NOT REMEMBER WHEN, HE DID NOT GO TO THE ER, BUT HE DOES HAS SOME BURISEING AND SCABS FROM THE FALL.
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
## 2022-10-27 ENCOUNTER — OFFICE VISIT (OUTPATIENT)
Dept: FAMILY MEDICINE CLINIC | Facility: CLINIC | Age: 57
End: 2022-10-27

## 2022-10-27 VITALS
DIASTOLIC BLOOD PRESSURE: 72 MMHG | WEIGHT: 233.8 LBS | RESPIRATION RATE: 18 BRPM | HEART RATE: 76 BPM | OXYGEN SATURATION: 98 % | SYSTOLIC BLOOD PRESSURE: 146 MMHG | BODY MASS INDEX: 35.43 KG/M2 | HEIGHT: 68 IN | TEMPERATURE: 97 F

## 2022-10-27 DIAGNOSIS — E11.65 TYPE 2 DIABETES MELLITUS WITH HYPERGLYCEMIA, WITHOUT LONG-TERM CURRENT USE OF INSULIN: Primary | ICD-10-CM

## 2022-10-27 DIAGNOSIS — I10 PRIMARY HYPERTENSION: Chronic | ICD-10-CM

## 2022-10-27 DIAGNOSIS — E66.01 CLASS 2 SEVERE OBESITY DUE TO EXCESS CALORIES WITH SERIOUS COMORBIDITY AND BODY MASS INDEX (BMI) OF 35.0 TO 35.9 IN ADULT: ICD-10-CM

## 2022-10-27 PROBLEM — E66.812 CLASS 2 SEVERE OBESITY DUE TO EXCESS CALORIES WITH SERIOUS COMORBIDITY AND BODY MASS INDEX (BMI) OF 37.0 TO 37.9 IN ADULT: Chronic | Status: RESOLVED | Noted: 2022-09-08 | Resolved: 2022-10-27

## 2022-10-27 PROCEDURE — 99214 OFFICE O/P EST MOD 30 MIN: CPT | Performed by: FAMILY MEDICINE

## 2022-10-27 NOTE — ASSESSMENT & PLAN NOTE
Patient's (Body mass index is 35.56 kg/m².) indicates that they are morbidly obese (BMI > 40 or > 35 with obesity - related health condition) with health conditions that include hypertension, diabetes mellitus and dyslipidemias . Weight is improving with lifestyle modifications. BMI is is above average; BMI management plan is completed. We discussed low calorie, low carb based diet program, portion control and increasing exercise.

## 2022-10-27 NOTE — PROGRESS NOTES
"Chief Complaint  Diabetes    Subjective        Nic Nicolas presents to [facility]  History of Present Illness  He is here today for management of his chronic medical conditions.  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     He does not check his blood sugar on a daily basis.     The patient is continuing to have chronic pain in his various joints.  He has been seen by pain management and is awaiting further treatment once his recent A1c's have been reviewed.    The patient has no other complaints today and denies chest pain, shortness of breath, weakness, numbness, nausea, vomiting, diarrhea, dizziness or syncopal event.      Objective   Vital Signs:  /72 (BP Location: Left arm, Patient Position: Sitting)   Pulse 76   Temp 97 °F (36.1 °C)   Resp 18   Ht 172.7 cm (67.99\")   Wt 106 kg (233 lb 12.8 oz)   SpO2 98%   BMI 35.56 kg/m²   Estimated body mass index is 35.56 kg/m² as calculated from the following:    Height as of this encounter: 172.7 cm (67.99\").    Weight as of this encounter: 106 kg (233 lb 12.8 oz).          Physical Exam  Vitals reviewed.   Constitutional:       Appearance: Normal appearance. He is well-developed. He is obese.   HENT:      Head: Normocephalic and atraumatic.      Right Ear: External ear normal.      Left Ear: External ear normal.      Mouth/Throat:      Pharynx: No oropharyngeal exudate.   Eyes:      Conjunctiva/sclera: Conjunctivae normal.      Pupils: Pupils are equal, round, and reactive to light.   Neck:      Vascular: No carotid bruit.   Cardiovascular:      Rate and Rhythm: Normal rate and regular rhythm.      Heart sounds: No murmur heard.    No friction rub. No gallop.   Pulmonary:     "  Effort: Pulmonary effort is normal.      Breath sounds: Normal breath sounds. No wheezing or rhonchi.   Abdominal:      General: There is no distension.   Skin:     General: Skin is warm and dry.   Neurological:      Mental Status: He is alert and oriented to person, place, and time.      Cranial Nerves: No cranial nerve deficit.      Motor: No weakness.   Psychiatric:         Mood and Affect: Mood and affect normal.         Behavior: Behavior normal.         Thought Content: Thought content normal.         Judgment: Judgment normal.        Result Review :    CMP    CMP 8/2/22 9/8/22 9/8/22 10/6/22     1428 1816    Glucose 559 (A) 644 (A) 434 (A) 219 (A)   BUN 26 (A) 20 17 20   Creatinine 2.12 (A) 1.30 (A) 0.98 1.12   Sodium 124 (A) 124 (A) 131 (A) 135 (A)   Potassium 5.1 5.7 (A) 4.6 4.6   Chloride 93 (A) 92 (A) 101 104   Calcium 8.5 (A) 9.1 8.3 (A) 8.8   Albumin 3.30 (A) 3.60  3.40 (A)   Total Bilirubin 1.3 (A) 2.2 (A)  1.3 (A)   Alkaline Phosphatase 118 (A) 115  102   AST (SGOT) 112 (A) 27  36   ALT (SGPT) 40 25  24   (A) Abnormal value            CBC    CBC 8/2/22 9/8/22 10/6/22   WBC 6.22 7.28 5.04   RBC 3.45 (A) 3.95 (A) 3.15 (A)   Hemoglobin 10.3 (A) 11.7 (A) 9.6 (A)   Hematocrit 31.4 (A) 34.2 (A) 29.6 (A)   MCV 91.0 86.6 94.0   MCH 29.9 29.6 30.5   MCHC 32.8 34.2 32.4   RDW 13.3 14.8 15.3   Platelets 80 (A) 107 (A) 84 (A)   (A) Abnormal value            Lipid Panel    Lipid Panel 1/20/22 4/12/22 7/8/22   Total Cholesterol 96 110 117   Triglycerides 78 69 31   HDL Cholesterol 35 (A) 63 (A) 59   VLDL Cholesterol 16 15 9   LDL Cholesterol  45 32 49   LDL/HDL Ratio 1.30 0.53 0.88   (A) Abnormal value            TSH    TSH 6/20/22 7/8/22 8/2/22   TSH 6.840 (A) 4.450 (A) 3.170   (A) Abnormal value                      Assessment and Plan   Diagnoses and all orders for this visit:    1. Type 2 diabetes mellitus with hyperglycemia, without long-term current use of insulin (HCC) (Primary)  Assessment &  Plan:  Diabetes is improving with treatment.   Continue current treatment regimen.  Dietary recommendations for ADA diet.  Diabetes will be reassessed in 6 months.      2. Class 2 severe obesity due to excess calories with serious comorbidity and body mass index (BMI) of 35.0 to 35.9 in adult (HCC)  Assessment & Plan:  Patient's (Body mass index is 35.56 kg/m².) indicates that they are morbidly obese (BMI > 40 or > 35 with obesity - related health condition) with health conditions that include hypertension, diabetes mellitus and dyslipidemias . Weight is improving with lifestyle modifications. BMI is is above average; BMI management plan is completed. We discussed low calorie, low carb based diet program, portion control and increasing exercise.       3. Primary hypertension  Assessment & Plan:  Hypertension is improving with treatment.  Continue current treatment regimen.  Dietary sodium restriction.  Weight loss.  Blood pressure will be reassessed at the next regular appointment.             Follow Up   No follow-ups on file.  Patient was given instructions and counseling regarding his condition or for health maintenance advice. Please see specific information pulled into the AVS if appropriate.

## 2022-10-28 ENCOUNTER — TELEPHONE (OUTPATIENT)
Dept: FAMILY MEDICINE CLINIC | Facility: CLINIC | Age: 57
End: 2022-10-28

## 2022-10-28 NOTE — TELEPHONE ENCOUNTER
Sher RN from home health called asking if they could draw the patient's A1C for an updated level. Let Sher know that would be fine and he could send the orders to office for provider to sign

## 2022-10-30 PROBLEM — E11.65 TYPE 2 DIABETES MELLITUS WITH HYPERGLYCEMIA: Chronic | Status: ACTIVE | Noted: 2018-05-21

## 2022-10-30 NOTE — ASSESSMENT & PLAN NOTE
The patient has been encouraged to watch his Tylenol intake and to avoid alcohol and to lose weight.  We will continue these suggestions moving forward.

## 2022-10-31 ENCOUNTER — APPOINTMENT (OUTPATIENT)
Dept: GENERAL RADIOLOGY | Facility: HOSPITAL | Age: 57
End: 2022-10-31

## 2022-10-31 ENCOUNTER — HOSPITAL ENCOUNTER (EMERGENCY)
Facility: HOSPITAL | Age: 57
Discharge: HOME | End: 2022-10-31
Attending: EMERGENCY MEDICINE | Admitting: EMERGENCY MEDICINE

## 2022-10-31 VITALS
BODY MASS INDEX: 36.69 KG/M2 | HEIGHT: 68 IN | WEIGHT: 242.06 LBS | SYSTOLIC BLOOD PRESSURE: 133 MMHG | TEMPERATURE: 98.1 F | RESPIRATION RATE: 18 BRPM | HEART RATE: 66 BPM | OXYGEN SATURATION: 100 % | DIASTOLIC BLOOD PRESSURE: 70 MMHG

## 2022-10-31 DIAGNOSIS — R11.0 NAUSEA: ICD-10-CM

## 2022-10-31 DIAGNOSIS — M54.50 LUMBAR BACK PAIN: Primary | ICD-10-CM

## 2022-10-31 DIAGNOSIS — R07.9 CHEST PAIN OF UNKNOWN ETIOLOGY: ICD-10-CM

## 2022-10-31 LAB
ALBUMIN SERPL-MCNC: 3.4 G/DL (ref 3.5–5.2)
ALBUMIN/GLOB SERPL: 1.2 G/DL
ALP SERPL-CCNC: 95 U/L (ref 39–117)
ALT SERPL W P-5'-P-CCNC: 23 U/L (ref 1–41)
ANION GAP SERPL CALCULATED.3IONS-SCNC: 8.4 MMOL/L (ref 5–15)
AST SERPL-CCNC: 35 U/L (ref 1–40)
BASOPHILS # BLD AUTO: 0.04 10*3/MM3 (ref 0–0.2)
BASOPHILS NFR BLD AUTO: 0.9 % (ref 0–1.5)
BILIRUB SERPL-MCNC: 1.3 MG/DL (ref 0–1.2)
BUN SERPL-MCNC: 18 MG/DL (ref 6–20)
BUN/CREAT SERPL: 16.4 (ref 7–25)
CALCIUM SPEC-SCNC: 8.7 MG/DL (ref 8.6–10.5)
CHLORIDE SERPL-SCNC: 109 MMOL/L (ref 98–107)
CO2 SERPL-SCNC: 21.6 MMOL/L (ref 22–29)
CREAT SERPL-MCNC: 1.1 MG/DL (ref 0.76–1.27)
DEPRECATED RDW RBC AUTO: 53 FL (ref 37–54)
EGFRCR SERPLBLD CKD-EPI 2021: 78.8 ML/MIN/1.73
EOSINOPHIL # BLD AUTO: 0.15 10*3/MM3 (ref 0–0.4)
EOSINOPHIL NFR BLD AUTO: 3.4 % (ref 0.3–6.2)
ERYTHROCYTE [DISTWIDTH] IN BLOOD BY AUTOMATED COUNT: 15.5 % (ref 12.3–15.4)
GLOBULIN UR ELPH-MCNC: 2.9 GM/DL
GLUCOSE SERPL-MCNC: 209 MG/DL (ref 65–99)
HCT VFR BLD AUTO: 29.5 % (ref 37.5–51)
HGB BLD-MCNC: 9.6 G/DL (ref 13–17.7)
HOLD SPECIMEN: NORMAL
HOLD SPECIMEN: NORMAL
IMM GRANULOCYTES # BLD AUTO: 0.01 10*3/MM3 (ref 0–0.05)
IMM GRANULOCYTES NFR BLD AUTO: 0.2 % (ref 0–0.5)
LIPASE SERPL-CCNC: 68 U/L (ref 13–60)
LYMPHOCYTES # BLD AUTO: 0.89 10*3/MM3 (ref 0.7–3.1)
LYMPHOCYTES NFR BLD AUTO: 20.5 % (ref 19.6–45.3)
MAGNESIUM SERPL-MCNC: 1.8 MG/DL (ref 1.6–2.6)
MCH RBC QN AUTO: 30.6 PG (ref 26.6–33)
MCHC RBC AUTO-ENTMCNC: 32.5 G/DL (ref 31.5–35.7)
MCV RBC AUTO: 93.9 FL (ref 79–97)
MONOCYTES # BLD AUTO: 0.37 10*3/MM3 (ref 0.1–0.9)
MONOCYTES NFR BLD AUTO: 8.5 % (ref 5–12)
NEUTROPHILS NFR BLD AUTO: 2.89 10*3/MM3 (ref 1.7–7)
NEUTROPHILS NFR BLD AUTO: 66.5 % (ref 42.7–76)
NRBC BLD AUTO-RTO: 0 /100 WBC (ref 0–0.2)
NT-PROBNP SERPL-MCNC: 219.6 PG/ML (ref 0–900)
PLATELET # BLD AUTO: 80 10*3/MM3 (ref 140–450)
PMV BLD AUTO: 11.1 FL (ref 6–12)
POTASSIUM SERPL-SCNC: 4.7 MMOL/L (ref 3.5–5.2)
PROT SERPL-MCNC: 6.3 G/DL (ref 6–8.5)
RBC # BLD AUTO: 3.14 10*6/MM3 (ref 4.14–5.8)
SODIUM SERPL-SCNC: 139 MMOL/L (ref 136–145)
TROPONIN I SERPL-MCNC: 0 NG/ML (ref 0–0.08)
TROPONIN I SERPL-MCNC: 0 NG/ML (ref 0–0.08)
WBC NRBC COR # BLD: 4.35 10*3/MM3 (ref 3.4–10.8)
WHOLE BLOOD HOLD COAG: NORMAL
WHOLE BLOOD HOLD SPECIMEN: NORMAL

## 2022-10-31 PROCEDURE — 36415 COLL VENOUS BLD VENIPUNCTURE: CPT

## 2022-10-31 PROCEDURE — 83690 ASSAY OF LIPASE: CPT | Performed by: EMERGENCY MEDICINE

## 2022-10-31 PROCEDURE — 72100 X-RAY EXAM L-S SPINE 2/3 VWS: CPT

## 2022-10-31 PROCEDURE — 85025 COMPLETE CBC W/AUTO DIFF WBC: CPT | Performed by: EMERGENCY MEDICINE

## 2022-10-31 PROCEDURE — 25010000002 ONDANSETRON PER 1 MG: Performed by: EMERGENCY MEDICINE

## 2022-10-31 PROCEDURE — 96375 TX/PRO/DX INJ NEW DRUG ADDON: CPT

## 2022-10-31 PROCEDURE — 93005 ELECTROCARDIOGRAM TRACING: CPT

## 2022-10-31 PROCEDURE — 93010 ELECTROCARDIOGRAM REPORT: CPT | Performed by: INTERNAL MEDICINE

## 2022-10-31 PROCEDURE — 83735 ASSAY OF MAGNESIUM: CPT | Performed by: EMERGENCY MEDICINE

## 2022-10-31 PROCEDURE — 99284 EMERGENCY DEPT VISIT MOD MDM: CPT

## 2022-10-31 PROCEDURE — 84484 ASSAY OF TROPONIN QUANT: CPT

## 2022-10-31 PROCEDURE — 25010000002 HYDROMORPHONE 1 MG/ML SOLUTION: Performed by: EMERGENCY MEDICINE

## 2022-10-31 PROCEDURE — 93005 ELECTROCARDIOGRAM TRACING: CPT | Performed by: EMERGENCY MEDICINE

## 2022-10-31 PROCEDURE — 71045 X-RAY EXAM CHEST 1 VIEW: CPT

## 2022-10-31 PROCEDURE — 80053 COMPREHEN METABOLIC PANEL: CPT | Performed by: EMERGENCY MEDICINE

## 2022-10-31 PROCEDURE — 96374 THER/PROPH/DIAG INJ IV PUSH: CPT

## 2022-10-31 PROCEDURE — 83880 ASSAY OF NATRIURETIC PEPTIDE: CPT | Performed by: EMERGENCY MEDICINE

## 2022-10-31 RX ORDER — ONDANSETRON 4 MG/1
4 TABLET, ORALLY DISINTEGRATING ORAL EVERY 8 HOURS PRN
Qty: 12 TABLET | Refills: 0 | Status: SHIPPED | OUTPATIENT
Start: 2022-10-31 | End: 2022-12-18

## 2022-10-31 RX ORDER — ONDANSETRON 2 MG/ML
4 INJECTION INTRAMUSCULAR; INTRAVENOUS ONCE
Status: COMPLETED | OUTPATIENT
Start: 2022-10-31 | End: 2022-10-31

## 2022-10-31 RX ORDER — SODIUM CHLORIDE 0.9 % (FLUSH) 0.9 %
10 SYRINGE (ML) INJECTION AS NEEDED
Status: DISCONTINUED | OUTPATIENT
Start: 2022-10-31 | End: 2022-10-31 | Stop reason: HOSPADM

## 2022-10-31 RX ORDER — ASPIRIN 81 MG/1
324 TABLET, CHEWABLE ORAL ONCE
Status: DISCONTINUED | OUTPATIENT
Start: 2022-10-31 | End: 2022-10-31 | Stop reason: HOSPADM

## 2022-10-31 RX ORDER — OXYCODONE HYDROCHLORIDE AND ACETAMINOPHEN 5; 325 MG/1; MG/1
1 TABLET ORAL EVERY 6 HOURS PRN
Qty: 12 TABLET | Refills: 0 | Status: SHIPPED | OUTPATIENT
Start: 2022-10-31 | End: 2022-11-07 | Stop reason: HOSPADM

## 2022-10-31 RX ADMIN — ONDANSETRON 4 MG: 2 INJECTION INTRAMUSCULAR; INTRAVENOUS at 11:43

## 2022-10-31 RX ADMIN — HYDROMORPHONE HYDROCHLORIDE 1 MG: 1 INJECTION, SOLUTION INTRAMUSCULAR; INTRAVENOUS; SUBCUTANEOUS at 11:43

## 2022-11-03 ENCOUNTER — TELEPHONE (OUTPATIENT)
Dept: FAMILY MEDICINE CLINIC | Facility: CLINIC | Age: 57
End: 2022-11-03

## 2022-11-03 LAB
QT INTERVAL: 417 MS
QT INTERVAL: 420 MS

## 2022-11-04 ENCOUNTER — APPOINTMENT (OUTPATIENT)
Dept: GENERAL RADIOLOGY | Facility: HOSPITAL | Age: 57
End: 2022-11-04

## 2022-11-04 ENCOUNTER — HOSPITAL ENCOUNTER (INPATIENT)
Facility: HOSPITAL | Age: 57
LOS: 3 days | Discharge: HOME | End: 2022-11-07
Attending: EMERGENCY MEDICINE | Admitting: INTERNAL MEDICINE

## 2022-11-04 DIAGNOSIS — K74.60 LIVER CIRRHOSIS SECONDARY TO NASH (NONALCOHOLIC STEATOHEPATITIS): ICD-10-CM

## 2022-11-04 DIAGNOSIS — K76.82 HEPATIC ENCEPHALOPATHY: Primary | ICD-10-CM

## 2022-11-04 DIAGNOSIS — R26.2 DIFFICULTY WALKING: ICD-10-CM

## 2022-11-04 DIAGNOSIS — Z78.9 DECREASED ACTIVITIES OF DAILY LIVING (ADL): ICD-10-CM

## 2022-11-04 DIAGNOSIS — K74.60 CIRRHOSIS OF LIVER WITH ASCITES, UNSPECIFIED HEPATIC CIRRHOSIS TYPE: ICD-10-CM

## 2022-11-04 DIAGNOSIS — R13.10 DYSPHAGIA, UNSPECIFIED TYPE: ICD-10-CM

## 2022-11-04 DIAGNOSIS — K75.81 LIVER CIRRHOSIS SECONDARY TO NASH (NONALCOHOLIC STEATOHEPATITIS): ICD-10-CM

## 2022-11-04 DIAGNOSIS — E13.65 UNCONTROLLED OTHER SPECIFIED DIABETES MELLITUS WITH HYPERGLYCEMIA: ICD-10-CM

## 2022-11-04 DIAGNOSIS — E83.42 HYPOMAGNESEMIA: ICD-10-CM

## 2022-11-04 DIAGNOSIS — R18.8 CIRRHOSIS OF LIVER WITH ASCITES, UNSPECIFIED HEPATIC CIRRHOSIS TYPE: ICD-10-CM

## 2022-11-04 LAB
ALBUMIN SERPL-MCNC: 3 G/DL (ref 3.5–5.2)
ALBUMIN/GLOB SERPL: 1 G/DL
ALP SERPL-CCNC: 86 U/L (ref 39–117)
ALT SERPL W P-5'-P-CCNC: 20 U/L (ref 1–41)
AMMONIA BLD-SCNC: 170 UMOL/L (ref 16–60)
ANION GAP SERPL CALCULATED.3IONS-SCNC: 11 MMOL/L (ref 5–15)
AST SERPL-CCNC: 33 U/L (ref 1–40)
B PARAPERT DNA SPEC QL NAA+PROBE: NOT DETECTED
B PERT DNA SPEC QL NAA+PROBE: NOT DETECTED
BASOPHILS # BLD AUTO: 0.03 10*3/MM3 (ref 0–0.2)
BASOPHILS NFR BLD AUTO: 0.8 % (ref 0–1.5)
BILIRUB SERPL-MCNC: 1.7 MG/DL (ref 0–1.2)
BILIRUB UR QL STRIP: NEGATIVE
BUN SERPL-MCNC: 14 MG/DL (ref 6–20)
BUN/CREAT SERPL: 14.4 (ref 7–25)
C PNEUM DNA NPH QL NAA+NON-PROBE: NOT DETECTED
CALCIUM SPEC-SCNC: 8.7 MG/DL (ref 8.6–10.5)
CHLORIDE SERPL-SCNC: 106 MMOL/L (ref 98–107)
CLARITY UR: CLEAR
CO2 SERPL-SCNC: 20 MMOL/L (ref 22–29)
COLOR UR: YELLOW
CREAT SERPL-MCNC: 0.97 MG/DL (ref 0.76–1.27)
DEPRECATED RDW RBC AUTO: 52.2 FL (ref 37–54)
EGFRCR SERPLBLD CKD-EPI 2021: 91.6 ML/MIN/1.73
EOSINOPHIL # BLD AUTO: 0.1 10*3/MM3 (ref 0–0.4)
EOSINOPHIL NFR BLD AUTO: 2.6 % (ref 0.3–6.2)
ERYTHROCYTE [DISTWIDTH] IN BLOOD BY AUTOMATED COUNT: 15 % (ref 12.3–15.4)
FERRITIN SERPL-MCNC: 53.85 NG/ML (ref 30–400)
FLUAV AG NPH QL: NEGATIVE
FLUAV SUBTYP SPEC NAA+PROBE: NOT DETECTED
FLUBV AG NPH QL IA: NEGATIVE
FLUBV RNA ISLT QL NAA+PROBE: NOT DETECTED
GLOBULIN UR ELPH-MCNC: 2.9 GM/DL
GLUCOSE BLDC GLUCOMTR-MCNC: 138 MG/DL (ref 70–99)
GLUCOSE BLDC GLUCOMTR-MCNC: 144 MG/DL (ref 70–99)
GLUCOSE BLDC GLUCOMTR-MCNC: 366 MG/DL (ref 70–99)
GLUCOSE BLDC GLUCOMTR-MCNC: 96 MG/DL (ref 70–99)
GLUCOSE SERPL-MCNC: 427 MG/DL (ref 65–99)
GLUCOSE UR STRIP-MCNC: NEGATIVE MG/DL
HADV DNA SPEC NAA+PROBE: NOT DETECTED
HCOV 229E RNA SPEC QL NAA+PROBE: NOT DETECTED
HCOV HKU1 RNA SPEC QL NAA+PROBE: NOT DETECTED
HCOV NL63 RNA SPEC QL NAA+PROBE: NOT DETECTED
HCOV OC43 RNA SPEC QL NAA+PROBE: NOT DETECTED
HCT VFR BLD AUTO: 28.5 % (ref 37.5–51)
HGB BLD-MCNC: 9.4 G/DL (ref 13–17.7)
HGB UR QL STRIP.AUTO: NEGATIVE
HMPV RNA NPH QL NAA+NON-PROBE: NOT DETECTED
HOLD SPECIMEN: NORMAL
HOLD SPECIMEN: NORMAL
HPIV1 RNA ISLT QL NAA+PROBE: NOT DETECTED
HPIV2 RNA SPEC QL NAA+PROBE: NOT DETECTED
HPIV3 RNA NPH QL NAA+PROBE: NOT DETECTED
HPIV4 P GENE NPH QL NAA+PROBE: NOT DETECTED
IMM GRANULOCYTES # BLD AUTO: 0.01 10*3/MM3 (ref 0–0.05)
IMM GRANULOCYTES NFR BLD AUTO: 0.3 % (ref 0–0.5)
IRON 24H UR-MRATE: 75 MCG/DL (ref 59–158)
IRON SATN MFR SERPL: 18 % (ref 20–50)
KETONES UR QL STRIP: NEGATIVE
LEUKOCYTE ESTERASE UR QL STRIP.AUTO: NEGATIVE
LIPASE SERPL-CCNC: 55 U/L (ref 13–60)
LYMPHOCYTES # BLD AUTO: 0.79 10*3/MM3 (ref 0.7–3.1)
LYMPHOCYTES NFR BLD AUTO: 20.5 % (ref 19.6–45.3)
M PNEUMO IGG SER IA-ACNC: NOT DETECTED
MAGNESIUM SERPL-MCNC: 1.4 MG/DL (ref 1.6–2.6)
MAGNESIUM SERPL-MCNC: 1.7 MG/DL (ref 1.6–2.6)
MCH RBC QN AUTO: 31.1 PG (ref 26.6–33)
MCHC RBC AUTO-ENTMCNC: 33 G/DL (ref 31.5–35.7)
MCV RBC AUTO: 94.4 FL (ref 79–97)
MONOCYTES # BLD AUTO: 0.42 10*3/MM3 (ref 0.1–0.9)
MONOCYTES NFR BLD AUTO: 10.9 % (ref 5–12)
NEUTROPHILS NFR BLD AUTO: 2.5 10*3/MM3 (ref 1.7–7)
NEUTROPHILS NFR BLD AUTO: 64.9 % (ref 42.7–76)
NITRITE UR QL STRIP: NEGATIVE
NRBC BLD AUTO-RTO: 0 /100 WBC (ref 0–0.2)
PH UR STRIP.AUTO: 6.5 [PH] (ref 5–8)
PHOSPHATE SERPL-MCNC: 3.8 MG/DL (ref 2.5–4.5)
PLATELET # BLD AUTO: 80 10*3/MM3 (ref 140–450)
PMV BLD AUTO: 10.7 FL (ref 6–12)
POTASSIUM SERPL-SCNC: 4.4 MMOL/L (ref 3.5–5.2)
PROCALCITONIN SERPL-MCNC: 0.1 NG/ML (ref 0–0.25)
PROT SERPL-MCNC: 5.9 G/DL (ref 6–8.5)
PROT UR QL STRIP: NEGATIVE
QT INTERVAL: 381 MS
RBC # BLD AUTO: 3.02 10*6/MM3 (ref 4.14–5.8)
RETICS # AUTO: 0.07 10*6/MM3 (ref 0.02–0.13)
RETICS/RBC NFR AUTO: 2.18 % (ref 0.7–1.9)
RHINOVIRUS RNA SPEC NAA+PROBE: NOT DETECTED
RSV RNA NPH QL NAA+NON-PROBE: NOT DETECTED
SARS-COV-2 RNA PNL SPEC NAA+PROBE: NOT DETECTED
SODIUM SERPL-SCNC: 137 MMOL/L (ref 136–145)
SP GR UR STRIP: 1.01 (ref 1–1.03)
TIBC SERPL-MCNC: 414 MCG/DL (ref 298–536)
TRANSFERRIN SERPL-MCNC: 278 MG/DL (ref 200–360)
TROPONIN T SERPL-MCNC: <0.01 NG/ML (ref 0–0.03)
UROBILINOGEN UR QL STRIP: NORMAL
WBC NRBC COR # BLD: 3.85 10*3/MM3 (ref 3.4–10.8)
WHOLE BLOOD HOLD COAG: NORMAL
WHOLE BLOOD HOLD SPECIMEN: NORMAL

## 2022-11-04 PROCEDURE — 87040 BLOOD CULTURE FOR BACTERIA: CPT | Performed by: EMERGENCY MEDICINE

## 2022-11-04 PROCEDURE — 83735 ASSAY OF MAGNESIUM: CPT | Performed by: HOSPITALIST

## 2022-11-04 PROCEDURE — 71045 X-RAY EXAM CHEST 1 VIEW: CPT

## 2022-11-04 PROCEDURE — 36415 COLL VENOUS BLD VENIPUNCTURE: CPT | Performed by: EMERGENCY MEDICINE

## 2022-11-04 PROCEDURE — 81003 URINALYSIS AUTO W/O SCOPE: CPT | Performed by: EMERGENCY MEDICINE

## 2022-11-04 PROCEDURE — 63710000001 INSULIN DETEMIR PER 5 UNITS: Performed by: HOSPITALIST

## 2022-11-04 PROCEDURE — 87449 NOS EACH ORGANISM AG IA: CPT | Performed by: HOSPITALIST

## 2022-11-04 PROCEDURE — 93010 ELECTROCARDIOGRAM REPORT: CPT | Performed by: INTERNAL MEDICINE

## 2022-11-04 PROCEDURE — 99223 1ST HOSP IP/OBS HIGH 75: CPT | Performed by: HOSPITALIST

## 2022-11-04 PROCEDURE — 84100 ASSAY OF PHOSPHORUS: CPT | Performed by: HOSPITALIST

## 2022-11-04 PROCEDURE — 83735 ASSAY OF MAGNESIUM: CPT

## 2022-11-04 PROCEDURE — 82140 ASSAY OF AMMONIA: CPT

## 2022-11-04 PROCEDURE — 83540 ASSAY OF IRON: CPT | Performed by: HOSPITALIST

## 2022-11-04 PROCEDURE — 25010000002 ENOXAPARIN PER 10 MG: Performed by: HOSPITALIST

## 2022-11-04 PROCEDURE — 93005 ELECTROCARDIOGRAM TRACING: CPT | Performed by: EMERGENCY MEDICINE

## 2022-11-04 PROCEDURE — 82746 ASSAY OF FOLIC ACID SERUM: CPT | Performed by: HOSPITALIST

## 2022-11-04 PROCEDURE — 63710000001 INSULIN LISPRO (HUMAN) PER 5 UNITS: Performed by: HOSPITALIST

## 2022-11-04 PROCEDURE — 87804 INFLUENZA ASSAY W/OPTIC: CPT | Performed by: HOSPITALIST

## 2022-11-04 PROCEDURE — 84466 ASSAY OF TRANSFERRIN: CPT | Performed by: HOSPITALIST

## 2022-11-04 PROCEDURE — 82962 GLUCOSE BLOOD TEST: CPT

## 2022-11-04 PROCEDURE — 85025 COMPLETE CBC W/AUTO DIFF WBC: CPT

## 2022-11-04 PROCEDURE — 85045 AUTOMATED RETICULOCYTE COUNT: CPT | Performed by: HOSPITALIST

## 2022-11-04 PROCEDURE — 82607 VITAMIN B-12: CPT | Performed by: HOSPITALIST

## 2022-11-04 PROCEDURE — 82728 ASSAY OF FERRITIN: CPT | Performed by: HOSPITALIST

## 2022-11-04 PROCEDURE — 83690 ASSAY OF LIPASE: CPT | Performed by: HOSPITALIST

## 2022-11-04 PROCEDURE — 25010000002 MAGNESIUM SULFATE 2 GM/50ML SOLUTION: Performed by: EMERGENCY MEDICINE

## 2022-11-04 PROCEDURE — 80053 COMPREHEN METABOLIC PANEL: CPT

## 2022-11-04 PROCEDURE — 25010000002 AZITHROMYCIN PER 500 MG: Performed by: HOSPITALIST

## 2022-11-04 PROCEDURE — 25010000002 CEFTRIAXONE PER 250 MG: Performed by: EMERGENCY MEDICINE

## 2022-11-04 PROCEDURE — 84484 ASSAY OF TROPONIN QUANT: CPT

## 2022-11-04 PROCEDURE — 0202U NFCT DS 22 TRGT SARS-COV-2: CPT | Performed by: HOSPITALIST

## 2022-11-04 PROCEDURE — 84145 PROCALCITONIN (PCT): CPT | Performed by: HOSPITALIST

## 2022-11-04 PROCEDURE — 93005 ELECTROCARDIOGRAM TRACING: CPT

## 2022-11-04 PROCEDURE — 99285 EMERGENCY DEPT VISIT HI MDM: CPT

## 2022-11-04 RX ORDER — SODIUM CHLORIDE 0.9 % (FLUSH) 0.9 %
10 SYRINGE (ML) INJECTION AS NEEDED
Status: DISCONTINUED | OUTPATIENT
Start: 2022-11-04 | End: 2022-11-07 | Stop reason: HOSPADM

## 2022-11-04 RX ORDER — FAMOTIDINE 20 MG/1
40 TABLET, FILM COATED ORAL DAILY
Status: DISCONTINUED | OUTPATIENT
Start: 2022-11-04 | End: 2022-11-07 | Stop reason: HOSPADM

## 2022-11-04 RX ORDER — NICOTINE POLACRILEX 4 MG
15 LOZENGE BUCCAL
Status: DISCONTINUED | OUTPATIENT
Start: 2022-11-04 | End: 2022-11-05

## 2022-11-04 RX ORDER — HYDROCODONE BITARTRATE AND ACETAMINOPHEN 5; 325 MG/1; MG/1
1 TABLET ORAL ONCE
Status: COMPLETED | OUTPATIENT
Start: 2022-11-04 | End: 2022-11-04

## 2022-11-04 RX ORDER — SPIRONOLACTONE 25 MG/1
100 TABLET ORAL 2 TIMES DAILY
Status: DISCONTINUED | OUTPATIENT
Start: 2022-11-04 | End: 2022-11-05

## 2022-11-04 RX ORDER — OXYCODONE AND ACETAMINOPHEN 7.5; 325 MG/1; MG/1
2 TABLET ORAL EVERY 4 HOURS PRN
Status: DISCONTINUED | OUTPATIENT
Start: 2022-11-04 | End: 2022-11-05

## 2022-11-04 RX ORDER — INSULIN LISPRO 100 [IU]/ML
1-200 INJECTION, SOLUTION INTRAVENOUS; SUBCUTANEOUS AS NEEDED
Status: DISCONTINUED | OUTPATIENT
Start: 2022-11-04 | End: 2022-11-05

## 2022-11-04 RX ORDER — LACTULOSE 10 G/15ML
30 SOLUTION ORAL 3 TIMES DAILY
Status: DISCONTINUED | OUTPATIENT
Start: 2022-11-04 | End: 2022-11-07 | Stop reason: HOSPADM

## 2022-11-04 RX ORDER — MORPHINE SULFATE 2 MG/ML
1 INJECTION, SOLUTION INTRAMUSCULAR; INTRAVENOUS EVERY 4 HOURS PRN
Status: DISCONTINUED | OUTPATIENT
Start: 2022-11-04 | End: 2022-11-05

## 2022-11-04 RX ORDER — MAGNESIUM SULFATE HEPTAHYDRATE 40 MG/ML
2 INJECTION, SOLUTION INTRAVENOUS ONCE
Status: COMPLETED | OUTPATIENT
Start: 2022-11-04 | End: 2022-11-04

## 2022-11-04 RX ORDER — POTASSIUM CHLORIDE 20 MEQ/1
TABLET, EXTENDED RELEASE ORAL
Qty: 30 TABLET | Refills: 0 | OUTPATIENT
Start: 2022-11-04

## 2022-11-04 RX ORDER — ENOXAPARIN SODIUM 100 MG/ML
40 INJECTION SUBCUTANEOUS EVERY 24 HOURS
Status: DISCONTINUED | OUTPATIENT
Start: 2022-11-04 | End: 2022-11-05

## 2022-11-04 RX ORDER — INSULIN LISPRO 100 [IU]/ML
1-200 INJECTION, SOLUTION INTRAVENOUS; SUBCUTANEOUS
Status: DISCONTINUED | OUTPATIENT
Start: 2022-11-04 | End: 2022-11-05

## 2022-11-04 RX ORDER — BUDESONIDE 0.5 MG/2ML
0.5 INHALANT ORAL
Status: DISCONTINUED | OUTPATIENT
Start: 2022-11-04 | End: 2022-11-05

## 2022-11-04 RX ORDER — ROPINIROLE 1 MG/1
1 TABLET, FILM COATED ORAL NIGHTLY
Status: DISCONTINUED | OUTPATIENT
Start: 2022-11-04 | End: 2022-11-07 | Stop reason: HOSPADM

## 2022-11-04 RX ORDER — HEPARIN SODIUM 5000 [USP'U]/ML
5000 INJECTION, SOLUTION INTRAVENOUS; SUBCUTANEOUS EVERY 8 HOURS SCHEDULED
Status: DISCONTINUED | OUTPATIENT
Start: 2022-11-04 | End: 2022-11-04

## 2022-11-04 RX ORDER — ATORVASTATIN CALCIUM 40 MG/1
40 TABLET, FILM COATED ORAL DAILY
Status: DISCONTINUED | OUTPATIENT
Start: 2022-11-04 | End: 2022-11-05

## 2022-11-04 RX ORDER — CHOLECALCIFEROL (VITAMIN D3) 125 MCG
5 CAPSULE ORAL NIGHTLY PRN
Status: DISCONTINUED | OUTPATIENT
Start: 2022-11-04 | End: 2022-11-07 | Stop reason: HOSPADM

## 2022-11-04 RX ORDER — PROPRANOLOL HCL 60 MG
120 CAPSULE, EXTENDED RELEASE 24HR ORAL DAILY
Status: DISCONTINUED | OUTPATIENT
Start: 2022-11-04 | End: 2022-11-05

## 2022-11-04 RX ORDER — DEXTROSE MONOHYDRATE 25 G/50ML
10-50 INJECTION, SOLUTION INTRAVENOUS
Status: DISCONTINUED | OUTPATIENT
Start: 2022-11-04 | End: 2022-11-05

## 2022-11-04 RX ORDER — SPIRONOLACTONE 100 MG/1
TABLET, FILM COATED ORAL
Qty: 60 TABLET | Refills: 1 | OUTPATIENT
Start: 2022-11-04

## 2022-11-04 RX ORDER — GUAIFENESIN 600 MG/1
600 TABLET, EXTENDED RELEASE ORAL EVERY 12 HOURS SCHEDULED
Status: DISCONTINUED | OUTPATIENT
Start: 2022-11-04 | End: 2022-11-05

## 2022-11-04 RX ORDER — ONDANSETRON 2 MG/ML
4 INJECTION INTRAMUSCULAR; INTRAVENOUS EVERY 6 HOURS PRN
Status: DISCONTINUED | OUTPATIENT
Start: 2022-11-04 | End: 2022-11-07 | Stop reason: HOSPADM

## 2022-11-04 RX ORDER — NALOXONE HCL 0.4 MG/ML
0.4 VIAL (ML) INJECTION
Status: DISCONTINUED | OUTPATIENT
Start: 2022-11-04 | End: 2022-11-05

## 2022-11-04 RX ORDER — CETIRIZINE HYDROCHLORIDE 10 MG/1
10 TABLET ORAL DAILY
Status: DISCONTINUED | OUTPATIENT
Start: 2022-11-04 | End: 2022-11-07 | Stop reason: HOSPADM

## 2022-11-04 RX ORDER — CEFTRIAXONE SODIUM 1 G/50ML
1 INJECTION, SOLUTION INTRAVENOUS EVERY 24 HOURS
Status: DISCONTINUED | OUTPATIENT
Start: 2022-11-05 | End: 2022-11-04

## 2022-11-04 RX ORDER — IPRATROPIUM BROMIDE AND ALBUTEROL SULFATE 2.5; .5 MG/3ML; MG/3ML
3 SOLUTION RESPIRATORY (INHALATION)
Status: DISCONTINUED | OUTPATIENT
Start: 2022-11-04 | End: 2022-11-07

## 2022-11-04 RX ORDER — SODIUM CHLORIDE 0.9 % (FLUSH) 0.9 %
10 SYRINGE (ML) INJECTION EVERY 12 HOURS SCHEDULED
Status: DISCONTINUED | OUTPATIENT
Start: 2022-11-04 | End: 2022-11-07 | Stop reason: HOSPADM

## 2022-11-04 RX ORDER — SODIUM CHLORIDE 9 MG/ML
100 INJECTION, SOLUTION INTRAVENOUS CONTINUOUS
Status: DISCONTINUED | OUTPATIENT
Start: 2022-11-04 | End: 2022-11-05

## 2022-11-04 RX ORDER — CEFTRIAXONE SODIUM 1 G/50ML
1 INJECTION, SOLUTION INTRAVENOUS EVERY 24 HOURS
Status: DISCONTINUED | OUTPATIENT
Start: 2022-11-05 | End: 2022-11-05

## 2022-11-04 RX ORDER — CEFTRIAXONE SODIUM 1 G/50ML
1 INJECTION, SOLUTION INTRAVENOUS ONCE
Status: COMPLETED | OUTPATIENT
Start: 2022-11-04 | End: 2022-11-04

## 2022-11-04 RX ORDER — MAGNESIUM SULFATE HEPTAHYDRATE 40 MG/ML
2 INJECTION, SOLUTION INTRAVENOUS ONCE
Status: DISCONTINUED | OUTPATIENT
Start: 2022-11-04 | End: 2022-11-07 | Stop reason: HOSPADM

## 2022-11-04 RX ORDER — ONDANSETRON 4 MG/1
4 TABLET, FILM COATED ORAL EVERY 6 HOURS PRN
Status: DISCONTINUED | OUTPATIENT
Start: 2022-11-04 | End: 2022-11-07 | Stop reason: HOSPADM

## 2022-11-04 RX ORDER — SODIUM CHLORIDE 9 MG/ML
40 INJECTION, SOLUTION INTRAVENOUS AS NEEDED
Status: DISCONTINUED | OUTPATIENT
Start: 2022-11-04 | End: 2022-11-07 | Stop reason: HOSPADM

## 2022-11-04 RX ORDER — NITROGLYCERIN 0.4 MG/1
0.4 TABLET SUBLINGUAL
Status: DISCONTINUED | OUTPATIENT
Start: 2022-11-04 | End: 2022-11-07 | Stop reason: HOSPADM

## 2022-11-04 RX ORDER — BUSPIRONE HYDROCHLORIDE 7.5 MG/1
7.5 TABLET ORAL EVERY 8 HOURS SCHEDULED
Status: DISCONTINUED | OUTPATIENT
Start: 2022-11-04 | End: 2022-11-05

## 2022-11-04 RX ADMIN — LACTULOSE 30 G: 20 SOLUTION ORAL at 22:00

## 2022-11-04 RX ADMIN — GUAIFENESIN 600 MG: 600 TABLET ORAL at 21:54

## 2022-11-04 RX ADMIN — INSULIN LISPRO 7 UNITS: 100 INJECTION, SOLUTION INTRAVENOUS; SUBCUTANEOUS at 18:39

## 2022-11-04 RX ADMIN — SERTRALINE HYDROCHLORIDE 50 MG: 50 TABLET ORAL at 22:25

## 2022-11-04 RX ADMIN — OXYCODONE HYDROCHLORIDE AND ACETAMINOPHEN 2 TABLET: 7.5; 325 TABLET ORAL at 19:41

## 2022-11-04 RX ADMIN — BUSPIRONE HYDROCHLORIDE 7.5 MG: 7.5 TABLET ORAL at 21:54

## 2022-11-04 RX ADMIN — ATORVASTATIN CALCIUM 40 MG: 40 TABLET, FILM COATED ORAL at 18:39

## 2022-11-04 RX ADMIN — ROPINIROLE HYDROCHLORIDE 1 MG: 1 TABLET, FILM COATED ORAL at 21:54

## 2022-11-04 RX ADMIN — Medication 10 ML: at 22:26

## 2022-11-04 RX ADMIN — MAGNESIUM SULFATE HEPTAHYDRATE 2 G: 2 INJECTION, SOLUTION INTRAVENOUS at 16:36

## 2022-11-04 RX ADMIN — SPIRONOLACTONE 100 MG: 25 TABLET ORAL at 21:54

## 2022-11-04 RX ADMIN — CETIRIZINE HYDROCHLORIDE 10 MG: 10 TABLET, FILM COATED ORAL at 18:39

## 2022-11-04 RX ADMIN — INSULIN LISPRO 1 UNITS: 100 INJECTION, SOLUTION INTRAVENOUS; SUBCUTANEOUS at 21:56

## 2022-11-04 RX ADMIN — SODIUM CHLORIDE 100 ML/HR: 9 INJECTION, SOLUTION INTRAVENOUS at 21:22

## 2022-11-04 RX ADMIN — AZITHROMYCIN 500 MG: 500 INJECTION, POWDER, LYOPHILIZED, FOR SOLUTION INTRAVENOUS at 21:23

## 2022-11-04 RX ADMIN — RIFAXIMIN 550 MG: 550 TABLET ORAL at 21:54

## 2022-11-04 RX ADMIN — INSULIN DETEMIR 30 UNITS: 100 INJECTION, SOLUTION SUBCUTANEOUS at 21:57

## 2022-11-04 RX ADMIN — ENOXAPARIN SODIUM 40 MG: 100 INJECTION SUBCUTANEOUS at 21:53

## 2022-11-04 RX ADMIN — FAMOTIDINE 40 MG: 20 TABLET ORAL at 18:39

## 2022-11-04 RX ADMIN — HYDROCODONE BITARTRATE AND ACETAMINOPHEN 1 TABLET: 5; 325 TABLET ORAL at 16:36

## 2022-11-04 RX ADMIN — LACTULOSE 30 G: 20 SOLUTION ORAL at 18:39

## 2022-11-04 RX ADMIN — CEFTRIAXONE SODIUM 1 G: 1 INJECTION, SOLUTION INTRAVENOUS at 21:24

## 2022-11-04 NOTE — PAYOR COMM NOTE
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 missed his dose but missed his dose today. Pt states that he is having cirrhosis and is \"not being self\" \"out\" and \"sick.\" Pt reports that he is having lower back pain and neck pain.              Medical Decision Making:              Hepatic encephalopathy    Hypomagnesemia    Uncontrolled other specified diabetes mellitus with hyperglycemia (HCC)    Diagnosis management comments: In summary this is a 57-year-old male with a history of Ruiz presents to the emergency department for altered mental status and confusion.  According to family at bedside he is not himself for few days.  Unsure when patient last took any of his medications including his cirrhosis medications and his diabetic medications.  Patient is awake and talkative but obviously confused.  Ammonia level elevated at 170.  CBC independently reviewed by me and shows no critical abnormalities.  CMP independently reviewed by me and shows no critical abnormalities except for hyperglycemia.  Chest x-ray concerning for developing lower lung pneumonia for which patient has " received Rocephin.  Magnesium low at 1.4.  This has been repleted with 2 g of magnesium IV.  Patient case has been discussed with the hospitalist team who will admit to the hospital for further evaluation and continuation of treatment.
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
## 2022-11-04 NOTE — NURSING NOTE
Pt presented to unit, no complaints at this time, vss, oriented to floor. Will continue to monitor.

## 2022-11-05 LAB
ANION GAP SERPL CALCULATED.3IONS-SCNC: 7.8 MMOL/L (ref 5–15)
BASOPHILS # BLD AUTO: 0.04 10*3/MM3 (ref 0–0.2)
BASOPHILS NFR BLD AUTO: 1.1 % (ref 0–1.5)
BUN SERPL-MCNC: 17 MG/DL (ref 6–20)
BUN/CREAT SERPL: 14.9 (ref 7–25)
CALCIUM SPEC-SCNC: 8.6 MG/DL (ref 8.6–10.5)
CHLORIDE SERPL-SCNC: 109 MMOL/L (ref 98–107)
CO2 SERPL-SCNC: 23.2 MMOL/L (ref 22–29)
CREAT SERPL-MCNC: 1.14 MG/DL (ref 0.76–1.27)
DEPRECATED RDW RBC AUTO: 52.3 FL (ref 37–54)
EGFRCR SERPLBLD CKD-EPI 2021: 75.5 ML/MIN/1.73
EOSINOPHIL # BLD AUTO: 0.15 10*3/MM3 (ref 0–0.4)
EOSINOPHIL NFR BLD AUTO: 3.9 % (ref 0.3–6.2)
ERYTHROCYTE [DISTWIDTH] IN BLOOD BY AUTOMATED COUNT: 15.2 % (ref 12.3–15.4)
FOLATE SERPL-MCNC: >20 NG/ML (ref 4.78–24.2)
GLUCOSE BLDC GLUCOMTR-MCNC: 109 MG/DL (ref 70–99)
GLUCOSE BLDC GLUCOMTR-MCNC: 110 MG/DL (ref 70–99)
GLUCOSE BLDC GLUCOMTR-MCNC: 113 MG/DL (ref 70–99)
GLUCOSE BLDC GLUCOMTR-MCNC: 157 MG/DL (ref 70–99)
GLUCOSE BLDC GLUCOMTR-MCNC: 165 MG/DL (ref 70–99)
GLUCOSE BLDC GLUCOMTR-MCNC: 66 MG/DL (ref 70–99)
GLUCOSE SERPL-MCNC: 92 MG/DL (ref 65–99)
HCT VFR BLD AUTO: 27.1 % (ref 37.5–51)
HGB BLD-MCNC: 9 G/DL (ref 13–17.7)
IMM GRANULOCYTES # BLD AUTO: 0.01 10*3/MM3 (ref 0–0.05)
IMM GRANULOCYTES NFR BLD AUTO: 0.3 % (ref 0–0.5)
L PNEUMO1 AG UR QL IA: NEGATIVE
LYMPHOCYTES # BLD AUTO: 1.2 10*3/MM3 (ref 0.7–3.1)
LYMPHOCYTES NFR BLD AUTO: 31.6 % (ref 19.6–45.3)
MAGNESIUM SERPL-MCNC: 1.9 MG/DL (ref 1.6–2.6)
MCH RBC QN AUTO: 31.1 PG (ref 26.6–33)
MCHC RBC AUTO-ENTMCNC: 33.2 G/DL (ref 31.5–35.7)
MCV RBC AUTO: 93.8 FL (ref 79–97)
MONOCYTES # BLD AUTO: 0.51 10*3/MM3 (ref 0.1–0.9)
MONOCYTES NFR BLD AUTO: 13.4 % (ref 5–12)
NEUTROPHILS NFR BLD AUTO: 1.89 10*3/MM3 (ref 1.7–7)
NEUTROPHILS NFR BLD AUTO: 49.7 % (ref 42.7–76)
NRBC BLD AUTO-RTO: 0 /100 WBC (ref 0–0.2)
PHOSPHATE SERPL-MCNC: 4.8 MG/DL (ref 2.5–4.5)
PLATELET # BLD AUTO: 72 10*3/MM3 (ref 140–450)
PMV BLD AUTO: 10.8 FL (ref 6–12)
POTASSIUM SERPL-SCNC: 4.3 MMOL/L (ref 3.5–5.2)
RBC # BLD AUTO: 2.89 10*6/MM3 (ref 4.14–5.8)
S PNEUM AG SPEC QL LA: NEGATIVE
SODIUM SERPL-SCNC: 140 MMOL/L (ref 136–145)
VIT B12 BLD-MCNC: 619 PG/ML (ref 211–946)
WBC NRBC COR # BLD: 3.8 10*3/MM3 (ref 3.4–10.8)

## 2022-11-05 PROCEDURE — 99233 SBSQ HOSP IP/OBS HIGH 50: CPT | Performed by: INTERNAL MEDICINE

## 2022-11-05 PROCEDURE — 85025 COMPLETE CBC W/AUTO DIFF WBC: CPT | Performed by: HOSPITALIST

## 2022-11-05 PROCEDURE — 25010000002 ONDANSETRON PER 1 MG: Performed by: HOSPITALIST

## 2022-11-05 PROCEDURE — 82962 GLUCOSE BLOOD TEST: CPT

## 2022-11-05 PROCEDURE — 80048 BASIC METABOLIC PNL TOTAL CA: CPT | Performed by: HOSPITALIST

## 2022-11-05 PROCEDURE — 83735 ASSAY OF MAGNESIUM: CPT | Performed by: HOSPITALIST

## 2022-11-05 PROCEDURE — 63710000001 INSULIN LISPRO (HUMAN) PER 5 UNITS: Performed by: INTERNAL MEDICINE

## 2022-11-05 PROCEDURE — 94799 UNLISTED PULMONARY SVC/PX: CPT

## 2022-11-05 PROCEDURE — 84100 ASSAY OF PHOSPHORUS: CPT | Performed by: HOSPITALIST

## 2022-11-05 PROCEDURE — 94760 N-INVAS EAR/PLS OXIMETRY 1: CPT

## 2022-11-05 PROCEDURE — 25010000002 FUROSEMIDE PER 20 MG: Performed by: INTERNAL MEDICINE

## 2022-11-05 RX ORDER — NICOTINE POLACRILEX 4 MG
15 LOZENGE BUCCAL
Status: DISCONTINUED | OUTPATIENT
Start: 2022-11-05 | End: 2022-11-07 | Stop reason: HOSPADM

## 2022-11-05 RX ORDER — DEXTROSE MONOHYDRATE 25 G/50ML
25 INJECTION, SOLUTION INTRAVENOUS
Status: DISCONTINUED | OUTPATIENT
Start: 2022-11-05 | End: 2022-11-07 | Stop reason: HOSPADM

## 2022-11-05 RX ORDER — SPIRONOLACTONE 25 MG/1
50 TABLET ORAL DAILY
Status: DISCONTINUED | OUTPATIENT
Start: 2022-11-05 | End: 2022-11-07 | Stop reason: HOSPADM

## 2022-11-05 RX ORDER — TRAMADOL HYDROCHLORIDE 50 MG/1
50 TABLET ORAL EVERY 8 HOURS PRN
Status: DISCONTINUED | OUTPATIENT
Start: 2022-11-05 | End: 2022-11-07 | Stop reason: HOSPADM

## 2022-11-05 RX ORDER — FUROSEMIDE 10 MG/ML
20 INJECTION INTRAMUSCULAR; INTRAVENOUS DAILY
Status: DISCONTINUED | OUTPATIENT
Start: 2022-11-05 | End: 2022-11-07 | Stop reason: HOSPADM

## 2022-11-05 RX ORDER — BUSPIRONE HYDROCHLORIDE 7.5 MG/1
7.5 TABLET ORAL 3 TIMES DAILY PRN
Status: DISCONTINUED | OUTPATIENT
Start: 2022-11-05 | End: 2022-11-07 | Stop reason: HOSPADM

## 2022-11-05 RX ORDER — INSULIN LISPRO 100 [IU]/ML
2-7 INJECTION, SOLUTION INTRAVENOUS; SUBCUTANEOUS
Status: DISCONTINUED | OUTPATIENT
Start: 2022-11-05 | End: 2022-11-07 | Stop reason: HOSPADM

## 2022-11-05 RX ADMIN — FAMOTIDINE 40 MG: 20 TABLET ORAL at 08:42

## 2022-11-05 RX ADMIN — FUROSEMIDE 20 MG: 10 INJECTION, SOLUTION INTRAMUSCULAR; INTRAVENOUS at 13:57

## 2022-11-05 RX ADMIN — Medication 10 ML: at 08:43

## 2022-11-05 RX ADMIN — CETIRIZINE HYDROCHLORIDE 10 MG: 10 TABLET, FILM COATED ORAL at 08:42

## 2022-11-05 RX ADMIN — RIFAXIMIN 550 MG: 550 TABLET ORAL at 08:42

## 2022-11-05 RX ADMIN — TRAMADOL HYDROCHLORIDE 50 MG: 50 TABLET, COATED ORAL at 18:41

## 2022-11-05 RX ADMIN — BUSPIRONE HYDROCHLORIDE 7.5 MG: 7.5 TABLET ORAL at 06:10

## 2022-11-05 RX ADMIN — INSULIN LISPRO 2 UNITS: 100 INJECTION, SOLUTION INTRAVENOUS; SUBCUTANEOUS at 17:26

## 2022-11-05 RX ADMIN — ONDANSETRON 4 MG: 2 INJECTION INTRAMUSCULAR; INTRAVENOUS at 10:51

## 2022-11-05 RX ADMIN — LACTULOSE 30 G: 20 SOLUTION ORAL at 08:42

## 2022-11-05 RX ADMIN — OXYCODONE HYDROCHLORIDE AND ACETAMINOPHEN 2 TABLET: 7.5; 325 TABLET ORAL at 08:03

## 2022-11-05 RX ADMIN — OXYCODONE HYDROCHLORIDE AND ACETAMINOPHEN 2 TABLET: 7.5; 325 TABLET ORAL at 00:07

## 2022-11-05 RX ADMIN — LACTULOSE 30 G: 20 SOLUTION ORAL at 16:05

## 2022-11-05 RX ADMIN — RIFAXIMIN 550 MG: 550 TABLET ORAL at 21:33

## 2022-11-05 RX ADMIN — ROPINIROLE HYDROCHLORIDE 1 MG: 1 TABLET, FILM COATED ORAL at 21:33

## 2022-11-05 RX ADMIN — LACTULOSE 30 G: 20 SOLUTION ORAL at 21:33

## 2022-11-05 RX ADMIN — Medication 10 ML: at 21:33

## 2022-11-05 RX ADMIN — SPIRONOLACTONE 50 MG: 25 TABLET ORAL at 08:42

## 2022-11-05 NOTE — PROGRESS NOTES
[facility]   Hospitalist Progress Note  Date: 2022  Patient Name: Nic Nicolas  : 1965  MRN: [id number]  Date of admission: 2022      Subjective   Subjective     Chief Complaint: confusion and falling     Summary:  57-year-old male presents to the emergency room with altered mental status.  He has been confused for the last 2 days.  Patient has nonalcoholic fatty liver disease and has not been himself.  He also reports that he is having lower back pain, neck pain.  Patient states that he recently fell. Patient was seen in the ER on 10/30/2022.  At that time, he reported low back pain from multiple falls and injuries.  He had chest pain.  Back x-ray that was done on 10/30/2022 showed chronic degenerative changes. On arrival patient's temperature is 98.2, pulse was 68, respiratory rate was 18, blood pressure was 150/74, and he is saturating 97% on room air. Chest x-ray shows potential developing right lower lobe pneumonia.   Recommend correlation for signs and symptoms of acute infection. On labs, magnesium is 1.7, lipase is 55, procalcitonin 0.10, glucose is 427, ammonia was 170.  Hemoglobin was 9.4.    Interval Followup:   Patient does not appear confused any longer.  Patient does admit to not being consistent with his medication.  He states that he has been having a lot of falling at home and has asked his sister to come over and help him however she has not been able to help out much patient continues to complain of chronic back and joint pain.  He does not appear to be on any chronic pain medication at home    Review of Systems   All systems were reviewed and negative except for: falling, weakness, back pain     Objective   Objective     Vitals:   Temp:  [97.7 °F (36.5 °C)-98.4 °F (36.9 °C)] 98.1 °F (36.7 °C)  Heart Rate:  [] 85  Resp:  [18] 18  BP: (124-167)/(54-85) 146/71  Physical Exam    Constitutional: Awake, alert, no acute distress sitting in bedside chair    Eyes: Pupils  equal, sclerae anicteric, no conjunctival injection   HENT: NCAT, mucous membranes moist   Neck: Supple, no thyromegaly, no lymphadenopathy, trachea midline   Respiratory: Clear to auscultation bilaterally, nonlabored respirations  No w/r/r    Cardiovascular: RRR, no murmurs   Gastrointestinal: Positive bowel sounds, soft, nontender, nondistended   Musculoskeletal: Full rom. LE edema noted    Psychiatric: Appropriate affect, cooperative   Neurologic: Oriented x 3, strength symmetric in all extremities, Cranial Nerves grossly intact to confrontation, speech clear   Skin: No rashes     Result Review    Result Review:  I have personally reviewed the results from the time of this admission to 11/5/2022 08:38 EDT and agree with these findings:  [x]  Laboratory   CBC    CBC 10/31/22 11/4/22 11/5/22   WBC 4.35 3.85 3.80   RBC 3.14 (A) 3.02 (A) 2.89 (A)   Hemoglobin 9.6 (A) 9.4 (A) 9.0 (A)   Hematocrit 29.5 (A) 28.5 (A) 27.1 (A)   MCV 93.9 94.4 93.8   MCH 30.6 31.1 31.1   MCHC 32.5 33.0 33.2   RDW 15.5 (A) 15.0 15.2   Platelets 80 (A) 80 (A) 72 (A)   (A) Abnormal value            BMP    BMP 10/31/22 11/4/22 11/5/22   BUN 18 14 17   Creatinine 1.10 0.97 1.14   Sodium 139 137 140   Potassium 4.7 4.4 4.3   Chloride 109 (A) 106 109 (A)   CO2 21.6 (A) 20.0 (A) 23.2   Calcium 8.7 8.7 8.6   (A) Abnormal value              [x]  Microbiology   No results found for: ACANTHNAEG, AFBCX, BPERTUSSISCX, BLOODCX  No results found for: BCIDPCR, CXREFLEX, CSFCX, CULTURETIS  No results found for: CULTURES, HSVCX, URCX  No results found for: EYECULTURE, GCCX, HSVCULTURE, LABHSV  No results found for: LEGIONELLA, MRSACX, MUMPSCX, MYCOPLASCX  No results found for: NOCARDIACX, STOOLCX  No results found for: THROATCX, UNSTIMCULT, URINECX, CULTURE, VZVCULTUR  No results found for: VIRALCULTU, WOUNDCX    [x]  Radiology   XR Chest 1 View    Result Date: 11/4/2022  PROCEDURE: XR CHEST 1 VW  COMPARISON: [facility], CR, XR CHEST 1 VW,  5/24/2022, 12:10.  [facility], CR, XR CHEST 1 VW, 6/27/2022, 15:46.  [facility], CR, XR CHEST 1 VW, 10/31/2022, 9:23.  INDICATIONS: Weak/Dizzy/AMS triage protocol/altered mental status  FINDINGS: There are patchy densities in the right lower lobe indicating a developing infiltrates. No significant pleural effusions are seen. The cardiac silhouette is within normal limits for size. The mediastinal soft tissues are unremarkable. No acute osseous abnormalities are observed.      Impression:  Evidence for potential developing right lower lobe pneumonia. Recommend correlation for signs or symptoms of acute infection and followup to resolution.         ASHLEY HIDALGO MD       Electronically Signed and Approved By: ASHLEY HIDALGO MD on 11/04/2022 at 12:56               []  EKG/Telemetry   []  Cardiology/Vascular   []  Pathology  []  Old records  []  Other:    Assessment & Plan   Assessment / Plan     Assessment/Plan:  • Hepatic encephalopathy, appears to have resolved  • Cirrhosis secondary to nonalcoholic fatty liver disease  • Diabetes mellitus type 2 on insulin  • Possible developing right lower lobe pneumonia however patient has no clinical signs of acute infection  • Anemia of chronic disease    PLAN  Continue patient on lactulose and rifaximin   Start IV lasix  At this time no overt signs of infection such as pneumonia therefore will not start patient on any antibiotics  Patient does not appear to be on any chronic pain medications and would try to avoid these at this time  Continue patient on sliding scale insulin for now as appetite increases can add back long-acting  Replace electrolytes as indicated  Given that patient has not been taking any home medication we will slowly add these back as needed  Continue patient on Aldactone but will decrease the dose since he has not been taking any  PT/OT        Discussed plan with RN.    DVT prophylaxis:  Mechanical DVT prophylaxis orders are  present.    CODE STATUS:   Level Of Support Discussed With: Patient  Code Status (Patient has no pulse and is not breathing): CPR (Attempt to Resuscitate)  Medical Interventions (Patient has pulse or is breathing): Full Support        Electronically signed by Jay Bridges DO, 11/05/22, 8:38 AM EDT.

## 2022-11-05 NOTE — PLAN OF CARE
"Goal Outcome Evaluation:      Patient stable throughout the day. Vitals remained stable as well. Pt complains of back pain with some relief of PRN meds. Pt educated multiple times on calling for staff for assistance since the bed alarm \"gets on his nerves.\" Bed and chair alarm placed on pt for fall prevention. Pt understands and got better with calling out.            "
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
## 2022-11-05 NOTE — PLAN OF CARE
Goal Outcome Evaluation:  Plan of Care Reviewed With: patient        Progress: no change   Vital signs with in normal limits. Covid test negative. Blood sugar 96 at hs. Snack of turkey sandwich given with scheduled levemir and Humalog per glucomander. Patient asked foe snack of ice cream,milk and sherbet while nurse at lunch. Blood sugar checked upon nurse return from lunch. Patient states felt blood sugar was going down. No other complaints at present time. Will continue to monitor.

## 2022-11-06 ENCOUNTER — APPOINTMENT (OUTPATIENT)
Dept: GENERAL RADIOLOGY | Facility: HOSPITAL | Age: 57
End: 2022-11-06

## 2022-11-06 LAB
AMMONIA BLD-SCNC: 75 UMOL/L (ref 16–60)
ANION GAP SERPL CALCULATED.3IONS-SCNC: 7 MMOL/L (ref 5–15)
BASOPHILS # BLD AUTO: 0.04 10*3/MM3 (ref 0–0.2)
BASOPHILS NFR BLD AUTO: 0.9 % (ref 0–1.5)
BUN SERPL-MCNC: 21 MG/DL (ref 6–20)
BUN/CREAT SERPL: 18.6 (ref 7–25)
CALCIUM SPEC-SCNC: 8.9 MG/DL (ref 8.6–10.5)
CHLORIDE SERPL-SCNC: 106 MMOL/L (ref 98–107)
CO2 SERPL-SCNC: 23 MMOL/L (ref 22–29)
CREAT SERPL-MCNC: 1.13 MG/DL (ref 0.76–1.27)
DEPRECATED RDW RBC AUTO: 51.5 FL (ref 37–54)
EGFRCR SERPLBLD CKD-EPI 2021: 76.3 ML/MIN/1.73
EOSINOPHIL # BLD AUTO: 0.19 10*3/MM3 (ref 0–0.4)
EOSINOPHIL NFR BLD AUTO: 4 % (ref 0.3–6.2)
ERYTHROCYTE [DISTWIDTH] IN BLOOD BY AUTOMATED COUNT: 15.3 % (ref 12.3–15.4)
GLUCOSE BLDC GLUCOMTR-MCNC: 121 MG/DL (ref 70–99)
GLUCOSE BLDC GLUCOMTR-MCNC: 135 MG/DL (ref 70–99)
GLUCOSE BLDC GLUCOMTR-MCNC: 144 MG/DL (ref 70–99)
GLUCOSE BLDC GLUCOMTR-MCNC: 182 MG/DL (ref 70–99)
GLUCOSE SERPL-MCNC: 206 MG/DL (ref 65–99)
HCT VFR BLD AUTO: 26.9 % (ref 37.5–51)
HGB BLD-MCNC: 9.1 G/DL (ref 13–17.7)
IMM GRANULOCYTES # BLD AUTO: 0.02 10*3/MM3 (ref 0–0.05)
IMM GRANULOCYTES NFR BLD AUTO: 0.4 % (ref 0–0.5)
LYMPHOCYTES # BLD AUTO: 0.92 10*3/MM3 (ref 0.7–3.1)
LYMPHOCYTES NFR BLD AUTO: 19.6 % (ref 19.6–45.3)
MAGNESIUM SERPL-MCNC: 1.8 MG/DL (ref 1.6–2.6)
MCH RBC QN AUTO: 31.3 PG (ref 26.6–33)
MCHC RBC AUTO-ENTMCNC: 33.8 G/DL (ref 31.5–35.7)
MCV RBC AUTO: 92.4 FL (ref 79–97)
MONOCYTES # BLD AUTO: 0.53 10*3/MM3 (ref 0.1–0.9)
MONOCYTES NFR BLD AUTO: 11.3 % (ref 5–12)
NEUTROPHILS NFR BLD AUTO: 3 10*3/MM3 (ref 1.7–7)
NEUTROPHILS NFR BLD AUTO: 63.8 % (ref 42.7–76)
NRBC BLD AUTO-RTO: 0 /100 WBC (ref 0–0.2)
PHOSPHATE SERPL-MCNC: 4.2 MG/DL (ref 2.5–4.5)
PLATELET # BLD AUTO: 69 10*3/MM3 (ref 140–450)
PMV BLD AUTO: 10.5 FL (ref 6–12)
POTASSIUM SERPL-SCNC: 4.5 MMOL/L (ref 3.5–5.2)
RBC # BLD AUTO: 2.91 10*6/MM3 (ref 4.14–5.8)
SODIUM SERPL-SCNC: 136 MMOL/L (ref 136–145)
WBC NRBC COR # BLD: 4.7 10*3/MM3 (ref 3.4–10.8)

## 2022-11-06 PROCEDURE — 83735 ASSAY OF MAGNESIUM: CPT | Performed by: HOSPITALIST

## 2022-11-06 PROCEDURE — 80048 BASIC METABOLIC PNL TOTAL CA: CPT | Performed by: HOSPITALIST

## 2022-11-06 PROCEDURE — 99233 SBSQ HOSP IP/OBS HIGH 50: CPT | Performed by: INTERNAL MEDICINE

## 2022-11-06 PROCEDURE — 82140 ASSAY OF AMMONIA: CPT | Performed by: INTERNAL MEDICINE

## 2022-11-06 PROCEDURE — 25010000002 ONDANSETRON PER 1 MG: Performed by: HOSPITALIST

## 2022-11-06 PROCEDURE — 97165 OT EVAL LOW COMPLEX 30 MIN: CPT

## 2022-11-06 PROCEDURE — 82962 GLUCOSE BLOOD TEST: CPT

## 2022-11-06 PROCEDURE — 94799 UNLISTED PULMONARY SVC/PX: CPT

## 2022-11-06 PROCEDURE — 84100 ASSAY OF PHOSPHORUS: CPT | Performed by: HOSPITALIST

## 2022-11-06 PROCEDURE — 25010000002 FUROSEMIDE PER 20 MG: Performed by: INTERNAL MEDICINE

## 2022-11-06 PROCEDURE — 85025 COMPLETE CBC W/AUTO DIFF WBC: CPT | Performed by: HOSPITALIST

## 2022-11-06 PROCEDURE — 73020 X-RAY EXAM OF SHOULDER: CPT

## 2022-11-06 PROCEDURE — 63710000001 INSULIN LISPRO (HUMAN) PER 5 UNITS: Performed by: INTERNAL MEDICINE

## 2022-11-06 PROCEDURE — 36415 COLL VENOUS BLD VENIPUNCTURE: CPT | Performed by: HOSPITALIST

## 2022-11-06 RX ORDER — HYDROCODONE BITARTRATE AND ACETAMINOPHEN 5; 325 MG/1; MG/1
1 TABLET ORAL EVERY 8 HOURS PRN
Status: DISCONTINUED | OUTPATIENT
Start: 2022-11-06 | End: 2022-11-07 | Stop reason: HOSPADM

## 2022-11-06 RX ORDER — OXYCODONE HYDROCHLORIDE AND ACETAMINOPHEN 5; 325 MG/1; MG/1
1 TABLET ORAL EVERY 6 HOURS PRN
Status: DISCONTINUED | OUTPATIENT
Start: 2022-11-06 | End: 2022-11-06

## 2022-11-06 RX ADMIN — LACTULOSE 30 G: 20 SOLUTION ORAL at 20:45

## 2022-11-06 RX ADMIN — Medication 10 ML: at 20:45

## 2022-11-06 RX ADMIN — FAMOTIDINE 40 MG: 20 TABLET ORAL at 08:37

## 2022-11-06 RX ADMIN — TRAMADOL HYDROCHLORIDE 50 MG: 50 TABLET, COATED ORAL at 03:30

## 2022-11-06 RX ADMIN — RIFAXIMIN 550 MG: 550 TABLET ORAL at 08:37

## 2022-11-06 RX ADMIN — ONDANSETRON 4 MG: 2 INJECTION INTRAMUSCULAR; INTRAVENOUS at 10:18

## 2022-11-06 RX ADMIN — SPIRONOLACTONE 50 MG: 25 TABLET ORAL at 08:37

## 2022-11-06 RX ADMIN — RIFAXIMIN 550 MG: 550 TABLET ORAL at 20:45

## 2022-11-06 RX ADMIN — LACTULOSE 30 G: 20 SOLUTION ORAL at 16:02

## 2022-11-06 RX ADMIN — OXYCODONE HYDROCHLORIDE AND ACETAMINOPHEN 1 TABLET: 5; 325 TABLET ORAL at 06:26

## 2022-11-06 RX ADMIN — Medication 5 MG: at 20:45

## 2022-11-06 RX ADMIN — LACTULOSE 30 G: 20 SOLUTION ORAL at 08:36

## 2022-11-06 RX ADMIN — ONDANSETRON 4 MG: 2 INJECTION INTRAMUSCULAR; INTRAVENOUS at 20:53

## 2022-11-06 RX ADMIN — Medication 10 ML: at 08:37

## 2022-11-06 RX ADMIN — CETIRIZINE HYDROCHLORIDE 10 MG: 10 TABLET, FILM COATED ORAL at 08:37

## 2022-11-06 RX ADMIN — FUROSEMIDE 20 MG: 10 INJECTION, SOLUTION INTRAMUSCULAR; INTRAVENOUS at 08:37

## 2022-11-06 RX ADMIN — ROPINIROLE HYDROCHLORIDE 1 MG: 1 TABLET, FILM COATED ORAL at 20:45

## 2022-11-06 RX ADMIN — HYDROCODONE BITARTRATE AND ACETAMINOPHEN 1 TABLET: 5; 325 TABLET ORAL at 12:36

## 2022-11-06 RX ADMIN — INSULIN LISPRO 2 UNITS: 100 INJECTION, SOLUTION INTRAVENOUS; SUBCUTANEOUS at 08:36

## 2022-11-06 NOTE — PLAN OF CARE
Goal Outcome Evaluation:   Vitals stable today. Blood sugar remained stable as well. Pt c/o of right shoulder pain. Xray completed. Pain meds adjusted again. Pt rested and sat up in chair most the day today. Did well being up adlib.

## 2022-11-06 NOTE — PROGRESS NOTES
[facility]   Hospitalist Progress Note  Date: 2022  Patient Name: Nic Nicolas  : 1965  MRN: [id number]  Date of admission: 2022      Subjective   Subjective     Chief Complaint: confusion and falling     Summary:  57-year-old male presents to the emergency room with altered mental status.  He has been confused for the last 2 days.  Patient has nonalcoholic fatty liver disease and has not been himself.  He also reports that he is having lower back pain, neck pain.  Patient states that he recently fell. Patient was seen in the ER on 10/30/2022.  At that time, he reported low back pain from multiple falls and injuries.  He had chest pain.  Back x-ray that was done on 10/30/2022 showed chronic degenerative changes. On arrival patient's temperature is 98.2, pulse was 68, respiratory rate was 18, blood pressure was 150/74, and he is saturating 97% on room air. Chest x-ray shows potential developing right lower lobe pneumonia.   Recommend correlation for signs and symptoms of acute infection. On labs, magnesium is 1.7, lipase is 55, procalcitonin 0.10, glucose is 427, ammonia was 170.  Hemoglobin was 9.4.    Interval Followup:   Patient denies any further confusion  Patient is constantly complaining about pain medication and wanting increased doses of pain medication.  Patient is not on any pain medication at home.  Patient was on pain medication in the remote past through the pain clinic however no longer is  Patient is complaining of right shoulder pain he states that he hurt his shoulder when he fell    Patient is agreeable to rehab placement  Review of Systems   All systems were reviewed and negative except for: falling, weakness, back pain right shoulder pain, chronic pain    Objective   Objective     Vitals:   Temp:  [97.5 °F (36.4 °C)-98.4 °F (36.9 °C)] 98.4 °F (36.9 °C)  Heart Rate:  [58-64] 64  Resp:  [18] 18  BP: (109-136)/(52-67) 112/52  Physical Exam    Constitutional: Awake, alert,  no acute distress sitting in bedside chair    Eyes: Pupils equal, sclerae anicteric, no conjunctival injection   HENT: NCAT, mucous membranes moist   Neck: Supple,   Respiratory: Clear to auscultation bilaterally, nonlabored respirations  No w/r/r    Cardiovascular: RRR, no murmurs   Gastrointestinal: Positive bowel sounds, soft, nontender, nondistended   Musculoskeletal: Limited range of motion of right shoulder.  Tenderness to palpation of right shoulder, full range of motion of other extremities   Psychiatric: Appropriate affect, cooperative   Neurologic: Oriented x 3, strength symmetric in all extremities, Cranial Nerves grossly intact to confrontation, speech clear   Skin: No rashes     Result Review    Result Review:  I have personally reviewed the results from the time of this admission to 11/6/2022 08:17 EST and agree with these findings:  [x]  Laboratory   CBC    CBC 11/4/22 11/5/22 11/6/22   WBC 3.85 3.80 4.70   RBC 3.02 (A) 2.89 (A) 2.91 (A)   Hemoglobin 9.4 (A) 9.0 (A) 9.1 (A)   Hematocrit 28.5 (A) 27.1 (A) 26.9 (A)   MCV 94.4 93.8 92.4   MCH 31.1 31.1 31.3   MCHC 33.0 33.2 33.8   RDW 15.0 15.2 15.3   Platelets 80 (A) 72 (A) 69 (A)   (A) Abnormal value            BMP    BMP 11/4/22 11/5/22 11/6/22   BUN 14 17 21 (A)   Creatinine 0.97 1.14 1.13   Sodium 137 140 136   Potassium 4.4 4.3 4.5   Chloride 106 109 (A) 106   CO2 20.0 (A) 23.2 23.0   Calcium 8.7 8.6 8.9   (A) Abnormal value              [x]  Microbiology   No results found for: ACANTHNAEG, AFBCX, BPERTUSSISCX, BLOODCX  No results found for: BCIDPCR, CXREFLEX, CSFCX, CULTURETIS  No results found for: CULTURES, HSVCX, URCX  No results found for: EYECULTURE, GCCX, HSVCULTURE, LABHSV  No results found for: LEGIONELLA, MRSACX, MUMPSCX, MYCOPLASCX  No results found for: NOCARDIACX, STOOLCX  No results found for: THROATCX, UNSTIMCULT, URINECX, CULTURE, VZVCULTUR  No results found for: VIRALCULTU, WOUNDCX    [x]  Radiology   XR Chest 1 View    Result  Date: 11/4/2022  PROCEDURE: XR CHEST 1 VW  COMPARISON: [facility], CR, XR CHEST 1 VW, 5/24/2022, 12:10.  [facility], CR, XR CHEST 1 VW, 6/27/2022, 15:46.  [facility], CR, XR CHEST 1 VW, 10/31/2022, 9:23.  INDICATIONS: Weak/Dizzy/AMS triage protocol/altered mental status  FINDINGS: There are patchy densities in the right lower lobe indicating a developing infiltrates. No significant pleural effusions are seen. The cardiac silhouette is within normal limits for size. The mediastinal soft tissues are unremarkable. No acute osseous abnormalities are observed.      Impression:  Evidence for potential developing right lower lobe pneumonia. Recommend correlation for signs or symptoms of acute infection and followup to resolution.         ASHLEY HIDALGO MD       Electronically Signed and Approved By: ASHLEY HIDALGO MD on 11/04/2022 at 12:56               []  EKG/Telemetry   []  Cardiology/Vascular   []  Pathology  []  Old records  []  Other:    Assessment & Plan   Assessment / Plan     Assessment/Plan:  • Hepatic encephalopathy, appears to have resolved  • Cirrhosis secondary to nonalcoholic fatty liver disease  • Diabetes mellitus type 2 on insulin  • Possible developing right lower lobe pneumonia however patient has no clinical signs of acute infection  • Anemia of chronic disease  • Right shoulder pain    PLAN  Continue patient on lactulose and rifaximin   Continue IV Lasix  Nighttime physician has started patient on oral pain medication we will de-escalate to [place] 5/325 every 8 hours as needed however patient is not on any pain medication at home and is aware he will not be discharged on any  Will obtain x-ray of his right shoulder for fall  Continue patient on sliding scale insulin for now. Does not appear to need long-acting insulin at this time  Replace electrolytes as indicated  Given that patient has not been taking any home medication we will slowly add these back as  needed  Continue patient on Aldactone at decreased dosage  PT/OT         Discussed plan with RN.    DVT prophylaxis: scd. Avoid lovenox due to low plt   Mechanical DVT prophylaxis orders are present.    CODE STATUS:   Level Of Support Discussed With: Patient  Code Status (Patient has no pulse and is not breathing): CPR (Attempt to Resuscitate)  Medical Interventions (Patient has pulse or is breathing): Full Support

## 2022-11-06 NOTE — PLAN OF CARE
Goal Outcome Evaluation:  Plan of Care Reviewed With: patient        Progress: no change  Outcome Evaluation: Patient not appropriate for skilled OT services at this time as patient has not had a decline in ADLs or ADL transfers/fx'l mobility. patient safe to d/c to previous setting with supervision, when medically appropriate. d/c occupational therapy services.
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
## 2022-11-06 NOTE — PLAN OF CARE
Problem: Adult Inpatient Plan of Care  Goal: Plan of Care Review  Outcome: Ongoing, Progressing  Flowsheets (Taken 11/6/2022 0451)  Progress: improving  Plan of Care Reviewed With: patient  Outcome Evaluation: No acute distress noted this shift, pt is on RA and is up ad myrtle. Complains of back pain 10/10, PRN tramadol given. Pt states is not helping back pain. VSS, resting quietly at this time. Continue with POC.  Goal: Patient-Specific Goal (Individualized)  Outcome: Ongoing, Progressing  Goal: Absence of Hospital-Acquired Illness or Injury  Outcome: Ongoing, Progressing  Intervention: Identify and Manage Fall Risk  Recent Flowsheet Documentation  Taken 11/6/2022 0400 by Thorn, Erinne, RN  Safety Promotion/Fall Prevention: safety round/check completed  Taken 11/6/2022 0200 by Thorn, Erinne, RN  Safety Promotion/Fall Prevention: safety round/check completed  Taken 11/6/2022 0000 by Thorn, Erinne, RN  Safety Promotion/Fall Prevention: safety round/check completed  Taken 11/5/2022 2200 by Thorn, Erinne, RN  Safety Promotion/Fall Prevention: safety round/check completed  Taken 11/5/2022 2000 by Thorn, Erinne, RN  Safety Promotion/Fall Prevention: safety round/check completed  Intervention: Prevent Skin Injury  Recent Flowsheet Documentation  Taken 11/5/2022 2130 by Thorn, Erinne, RN  Body Position: position changed independently  Skin Protection:   adhesive use limited   tubing/devices free from skin contact  Intervention: Prevent and Manage VTE (Venous Thromboembolism) Risk  Recent Flowsheet Documentation  Taken 11/5/2022 2130 by Thorn, Erinne, RN  Activity Management:   activity adjusted per tolerance   up ad myrtle  VTE Prevention/Management: (see MAR) other (see comments)  Range of Motion: active ROM (range of motion) encouraged  Intervention: Prevent Infection  Recent Flowsheet Documentation  Taken 11/6/2022 0400 by Thorn, Erinne, RN  Infection Prevention:   single patient room provided   rest/sleep promoted   hand  hygiene promoted  Taken 11/6/2022 0200 by Thorn, Erinne, RN  Infection Prevention:   single patient room provided   rest/sleep promoted   hand hygiene promoted  Taken 11/6/2022 0000 by Thorn, Erinne, RN  Infection Prevention:   single patient room provided   rest/sleep promoted   hand hygiene promoted  Taken 11/5/2022 2200 by Thorn, Erinne, RN  Infection Prevention:   single patient room provided   rest/sleep promoted   hand hygiene promoted  Taken 11/5/2022 2000 by Thorn, Erinne, RN  Infection Prevention:   single patient room provided   rest/sleep promoted   hand hygiene promoted  Goal: Optimal Comfort and Wellbeing  Outcome: Ongoing, Progressing  Intervention: Monitor Pain and Promote Comfort  Recent Flowsheet Documentation  Taken 11/5/2022 2130 by Thorn, Erinne, RN  Pain Management Interventions: position adjusted  Intervention: Provide Person-Centered Care  Recent Flowsheet Documentation  Taken 11/5/2022 2130 by Thorn, Erinne, RN  Trust Relationship/Rapport:   care explained   thoughts/feelings acknowledged  Goal: Readiness for Transition of Care  Outcome: Ongoing, Progressing     Problem: Skin Injury Risk Increased  Goal: Skin Health and Integrity  Outcome: Ongoing, Progressing  Intervention: Optimize Skin Protection  Recent Flowsheet Documentation  Taken 11/5/2022 2130 by Thorn, Erinne, RN  Pressure Reduction Techniques: frequent weight shift encouraged  Head of Bed (HOB) Positioning: HOB lowered  Pressure Reduction Devices:   positioning supports utilized   pressure-redistributing mattress utilized  Skin Protection:   adhesive use limited   tubing/devices free from skin contact     Problem: Fall Injury Risk  Goal: Absence of Fall and Fall-Related Injury  Outcome: Ongoing, Progressing  Intervention: Identify and Manage Contributors  Recent Flowsheet Documentation  Taken 11/6/2022 0400 by Thorn, Erinne, RN  Medication Review/Management: medications reviewed  Taken 11/6/2022 0200 by Thorn, Erinne,  RN  Medication Review/Management: medications reviewed  Taken 11/6/2022 0000 by Thorn, Erinne, RN  Medication Review/Management: medications reviewed  Taken 11/5/2022 2200 by Thorn, Erinne, RN  Medication Review/Management: medications reviewed  Taken 11/5/2022 2130 by Thorn, Erinne, RN  Self-Care Promotion: independence encouraged  Taken 11/5/2022 2000 by Thorn, Erinne, RN  Medication Review/Management: medications reviewed  Intervention: Promote Injury-Free Environment  Recent Flowsheet Documentation  Taken 11/6/2022 0400 by Thorn, Erinne, RN  Safety Promotion/Fall Prevention: safety round/check completed  Taken 11/6/2022 0200 by Thorn, Erinne, RN  Safety Promotion/Fall Prevention: safety round/check completed  Taken 11/6/2022 0000 by Thorn, Erinne, RN  Safety Promotion/Fall Prevention: safety round/check completed  Taken 11/5/2022 2200 by Thorn, Erinne, RN  Safety Promotion/Fall Prevention: safety round/check completed  Taken 11/5/2022 2000 by Thorn, Erinne, RN  Safety Promotion/Fall Prevention: safety round/check completed     Problem: Adjustment to Illness (Liver Failure)  Goal: Optimal Coping with Liver Failure  Outcome: Ongoing, Progressing  Intervention: Support Response to Liver Disease  Recent Flowsheet Documentation  Taken 11/5/2022 2130 by Thorn, Erinne, RN  Diversional Activities: television  Family/Support System Care: self-care encouraged     Problem: Fluid and Electrolyte Imbalance (Liver Failure)  Goal: Fluid and Electrolyte Balance  Outcome: Ongoing, Progressing     Problem: Gastrointestinal Complications (Liver Failure)  Goal: Optimal Gastrointestinal Function  Outcome: Ongoing, Progressing  Intervention: Monitor and Support Gastrointestinal Function  Recent Flowsheet Documentation  Taken 11/5/2022 2130 by Thorn, Erinne, RN  Body Position: position changed independently     Problem: Impaired Coagulation (Liver Failure)  Goal: Optimal Coagulation Function  Outcome: Ongoing, Progressing     Problem:  Infection (Liver Failure)  Goal: Absence of Infection Signs and Symptoms  Outcome: Ongoing, Progressing     Problem: Neurologic Function Impaired (Liver Failure)  Goal: Optimal Neurologic Function  Outcome: Ongoing, Progressing     Problem: Oral Intake Inadequate (Liver Failure)  Goal: Improved Oral Intake  Outcome: Ongoing, Progressing     Problem: Pain (Liver Failure)  Goal: Optimal Pain Control  Outcome: Ongoing, Progressing  Intervention: Prevent or Manage Pain  Recent Flowsheet Documentation  Taken 11/5/2022 2130 by Thorn, Erinne, RN  Pain Management Interventions: position adjusted     Problem: Renal Dysfunction (Liver Failure)  Goal: Optimize Renal Function  Outcome: Ongoing, Progressing     Problem: Respiratory Compromise (Liver Failure)  Goal: Effective Oxygenation and Ventilation  Outcome: Ongoing, Progressing  Intervention: Optimize Oxygenation and Ventilation  Recent Flowsheet Documentation  Taken 11/5/2022 2130 by Thorn, Erinne, RN  Head of Bed (HOB) Positioning: HOB lowered  Intervention: Promote Airway Secretion Clearance  Recent Flowsheet Documentation  Taken 11/5/2022 2130 by Thorn, Erinne, RN  Activity Management:   activity adjusted per tolerance   up ad myrtle  Cough And Deep Breathing: done with encouragement     Problem: Hyperglycemia  Goal: Blood Glucose Level Within Targeted Range  Outcome: Ongoing, Progressing  Intervention: Optimize Glycemic Control  Recent Flowsheet Documentation  Taken 11/5/2022 2130 by Thorn, Erinne, RN  Glycemic Management:   blood glucose monitored   supplemental insulin given   Goal Outcome Evaluation:  Plan of Care Reviewed With: patient        Progress: improving  Outcome Evaluation: No acute distress noted this shift, pt is on RA and is up ad myrtle. Complains of back pain 10/10, PRN tramadol given. Pt states is not helping back pain. VSS, resting quietly at this time. Continue with POC.

## 2022-11-07 ENCOUNTER — READMISSION MANAGEMENT (OUTPATIENT)
Dept: CALL CENTER | Facility: HOSPITAL | Age: 57
End: 2022-11-07

## 2022-11-07 VITALS
WEIGHT: 237.66 LBS | RESPIRATION RATE: 18 BRPM | OXYGEN SATURATION: 100 % | BODY MASS INDEX: 36.02 KG/M2 | TEMPERATURE: 97.3 F | SYSTOLIC BLOOD PRESSURE: 136 MMHG | HEART RATE: 72 BPM | DIASTOLIC BLOOD PRESSURE: 65 MMHG | HEIGHT: 68 IN

## 2022-11-07 LAB
AMMONIA BLD-SCNC: 57 UMOL/L (ref 16–60)
ANION GAP SERPL CALCULATED.3IONS-SCNC: 9 MMOL/L (ref 5–15)
BASOPHILS # BLD AUTO: 0.03 10*3/MM3 (ref 0–0.2)
BASOPHILS NFR BLD AUTO: 0.8 % (ref 0–1.5)
BUN SERPL-MCNC: 23 MG/DL (ref 6–20)
BUN/CREAT SERPL: 23.5 (ref 7–25)
CALCIUM SPEC-SCNC: 9 MG/DL (ref 8.6–10.5)
CHLORIDE SERPL-SCNC: 104 MMOL/L (ref 98–107)
CO2 SERPL-SCNC: 21 MMOL/L (ref 22–29)
CREAT SERPL-MCNC: 0.98 MG/DL (ref 0.76–1.27)
DEPRECATED RDW RBC AUTO: 53.3 FL (ref 37–54)
EGFRCR SERPLBLD CKD-EPI 2021: 90.5 ML/MIN/1.73
EOSINOPHIL # BLD AUTO: 0.15 10*3/MM3 (ref 0–0.4)
EOSINOPHIL NFR BLD AUTO: 4 % (ref 0.3–6.2)
ERYTHROCYTE [DISTWIDTH] IN BLOOD BY AUTOMATED COUNT: 15 % (ref 12.3–15.4)
GLUCOSE BLDC GLUCOMTR-MCNC: 118 MG/DL (ref 70–99)
GLUCOSE BLDC GLUCOMTR-MCNC: 290 MG/DL (ref 70–99)
GLUCOSE SERPL-MCNC: 173 MG/DL (ref 65–99)
HCT VFR BLD AUTO: 29 % (ref 37.5–51)
HGB BLD-MCNC: 9.3 G/DL (ref 13–17.7)
IMM GRANULOCYTES # BLD AUTO: 0.01 10*3/MM3 (ref 0–0.05)
IMM GRANULOCYTES NFR BLD AUTO: 0.3 % (ref 0–0.5)
LYMPHOCYTES # BLD AUTO: 0.86 10*3/MM3 (ref 0.7–3.1)
LYMPHOCYTES NFR BLD AUTO: 23.2 % (ref 19.6–45.3)
MAGNESIUM SERPL-MCNC: 1.5 MG/DL (ref 1.6–2.6)
MCH RBC QN AUTO: 31.3 PG (ref 26.6–33)
MCHC RBC AUTO-ENTMCNC: 32.1 G/DL (ref 31.5–35.7)
MCV RBC AUTO: 97.6 FL (ref 79–97)
MONOCYTES # BLD AUTO: 0.41 10*3/MM3 (ref 0.1–0.9)
MONOCYTES NFR BLD AUTO: 11.1 % (ref 5–12)
NEUTROPHILS NFR BLD AUTO: 2.25 10*3/MM3 (ref 1.7–7)
NEUTROPHILS NFR BLD AUTO: 60.6 % (ref 42.7–76)
NRBC BLD AUTO-RTO: 0 /100 WBC (ref 0–0.2)
PLATELET # BLD AUTO: 71 10*3/MM3 (ref 140–450)
PMV BLD AUTO: 11.2 FL (ref 6–12)
POTASSIUM SERPL-SCNC: 4.5 MMOL/L (ref 3.5–5.2)
RBC # BLD AUTO: 2.97 10*6/MM3 (ref 4.14–5.8)
SODIUM SERPL-SCNC: 134 MMOL/L (ref 136–145)
WBC NRBC COR # BLD: 3.71 10*3/MM3 (ref 3.4–10.8)

## 2022-11-07 PROCEDURE — 80048 BASIC METABOLIC PNL TOTAL CA: CPT | Performed by: HOSPITALIST

## 2022-11-07 PROCEDURE — 99239 HOSP IP/OBS DSCHRG MGMT >30: CPT | Performed by: INTERNAL MEDICINE

## 2022-11-07 PROCEDURE — 92610 EVALUATE SWALLOWING FUNCTION: CPT

## 2022-11-07 PROCEDURE — 25010000002 ONDANSETRON PER 1 MG: Performed by: HOSPITALIST

## 2022-11-07 PROCEDURE — 25010000002 MAGNESIUM SULFATE 2 GM/50ML SOLUTION: Performed by: INTERNAL MEDICINE

## 2022-11-07 PROCEDURE — 36415 COLL VENOUS BLD VENIPUNCTURE: CPT | Performed by: HOSPITALIST

## 2022-11-07 PROCEDURE — 25010000002 FUROSEMIDE PER 20 MG: Performed by: INTERNAL MEDICINE

## 2022-11-07 PROCEDURE — 83735 ASSAY OF MAGNESIUM: CPT | Performed by: INTERNAL MEDICINE

## 2022-11-07 PROCEDURE — 82140 ASSAY OF AMMONIA: CPT | Performed by: INTERNAL MEDICINE

## 2022-11-07 PROCEDURE — 82962 GLUCOSE BLOOD TEST: CPT

## 2022-11-07 PROCEDURE — 85025 COMPLETE CBC W/AUTO DIFF WBC: CPT | Performed by: HOSPITALIST

## 2022-11-07 PROCEDURE — 63710000001 INSULIN LISPRO (HUMAN) PER 5 UNITS: Performed by: INTERNAL MEDICINE

## 2022-11-07 PROCEDURE — 97161 PT EVAL LOW COMPLEX 20 MIN: CPT

## 2022-11-07 RX ORDER — SPIRONOLACTONE 100 MG/1
100 TABLET, FILM COATED ORAL DAILY
Start: 2022-11-07 | End: 2022-12-06

## 2022-11-07 RX ORDER — MAGNESIUM SULFATE HEPTAHYDRATE 40 MG/ML
2 INJECTION, SOLUTION INTRAVENOUS ONCE
Status: COMPLETED | OUTPATIENT
Start: 2022-11-07 | End: 2022-11-07

## 2022-11-07 RX ORDER — IPRATROPIUM BROMIDE AND ALBUTEROL SULFATE 2.5; .5 MG/3ML; MG/3ML
3 SOLUTION RESPIRATORY (INHALATION) EVERY 6 HOURS PRN
Status: DISCONTINUED | OUTPATIENT
Start: 2022-11-07 | End: 2022-11-07 | Stop reason: HOSPADM

## 2022-11-07 RX ADMIN — POLYETHYLENE GLYCOL, PROPYLENE GLYCOL 2 DROP: .4; .3 LIQUID OPHTHALMIC at 05:46

## 2022-11-07 RX ADMIN — INSULIN LISPRO 4 UNITS: 100 INJECTION, SOLUTION INTRAVENOUS; SUBCUTANEOUS at 11:54

## 2022-11-07 RX ADMIN — TRAMADOL HYDROCHLORIDE 50 MG: 50 TABLET, COATED ORAL at 04:41

## 2022-11-07 RX ADMIN — HYDROCODONE BITARTRATE AND ACETAMINOPHEN 1 TABLET: 5; 325 TABLET ORAL at 09:52

## 2022-11-07 RX ADMIN — CETIRIZINE HYDROCHLORIDE 10 MG: 10 TABLET, FILM COATED ORAL at 08:04

## 2022-11-07 RX ADMIN — Medication 10 ML: at 08:05

## 2022-11-07 RX ADMIN — SPIRONOLACTONE 50 MG: 25 TABLET ORAL at 08:04

## 2022-11-07 RX ADMIN — HYDROCODONE BITARTRATE AND ACETAMINOPHEN 1 TABLET: 5; 325 TABLET ORAL at 01:15

## 2022-11-07 RX ADMIN — FAMOTIDINE 40 MG: 20 TABLET ORAL at 08:04

## 2022-11-07 RX ADMIN — LACTULOSE 30 G: 20 SOLUTION ORAL at 08:04

## 2022-11-07 RX ADMIN — MAGNESIUM SULFATE HEPTAHYDRATE 2 G: 2 INJECTION, SOLUTION INTRAVENOUS at 14:01

## 2022-11-07 RX ADMIN — POLYETHYLENE GLYCOL, PROPYLENE GLYCOL 2 DROP: .4; .3 LIQUID OPHTHALMIC at 08:08

## 2022-11-07 RX ADMIN — ONDANSETRON 4 MG: 2 INJECTION INTRAMUSCULAR; INTRAVENOUS at 09:52

## 2022-11-07 RX ADMIN — RIFAXIMIN 550 MG: 550 TABLET ORAL at 08:04

## 2022-11-07 RX ADMIN — FUROSEMIDE 20 MG: 10 INJECTION, SOLUTION INTRAMUSCULAR; INTRAVENOUS at 08:04

## 2022-11-07 NOTE — DISCHARGE SUMMARY
[facility]         HOSPITALIST  DISCHARGE SUMMARY    Patient Name: Nic Nicolas  : 1965  MRN: [id number]    Date of Admission: 2022  Date of Discharge:  2022    Primary Care Physician: Alina Crawford DO    Consults     Date and Time Order Name Status Description    2022  3:49 PM Inpatient Hospitalist Consult            Active and Resolved Hospital Problems:  Active Hospital Problems    Diagnosis POA   • **Hepatic encephalopathy [K76.82] Yes      Resolved Hospital Problems   No resolved problems to display.       Hospital Course     Hospital Course:  Nic Nicolas is a 57 y.o. male who presented to the emergency room with altered mental status.  He has been confused for the last 2 days.  Patient has nonalcoholic fatty liver disease and has not been himself.  He also reports that he is having lower back pain, neck pain.  Patient states that he recently fell. Patient was seen in the ER on 10/30/2022.  At that time, he reported low back pain from multiple falls and injuries.  He had chest pain.  Back x-ray that was done on 10/30/2022 showed chronic degenerative changes. On arrival patient's temperature is 98.2, pulse was 68, respiratory rate was 18, blood pressure was 150/74, and he is saturating 97% on room air. Chest x-ray shows potential developing right lower lobe pneumonia.   Recommend correlation for signs and symptoms of acute infection. On labs, magnesium is 1.7, lipase is 55, procalcitonin 0.10, glucose is 427, ammonia was 170.  Hemoglobin was 9.4.  Patient was started lactulose with improvement in his ammonia level to 57. Mental status is normal.  Patient does not have any concerning signs for acute infection such as seen on chest x-ray therefore no antibiotics indicated at this time.  Patient was complaining of right shoulder pain x-ray shows osteoarthritis but findings.  Patient was continued on most of his home medication.  Patient only required sliding  scale insulin during his hospitalization however he is on long-acting insulin at home.  At this time we will also recommend decreasing his Aldactone dose I find it hard to believe he is really on 200 mg twice daily.  At this time would recommend 100 mg daily.  Patient is not getting any narcotics as an outpatient therefore we will remove those from his home medication list.  Patient stated that he was possibly going to go to [place] as an outpatient however social work did call over there and confirm that that was not true and they do not take passport therefore there is no way he can go to [place].  He is actually up and ambulating around the room and around the hospital floor with no difficulty.  At this time patient will be discharged home with home health services.  Patient should continue to take his lactulose given when his ammonia is elevated he gets confused and has frequent falling     DISCHARGE Follow Up Recommendations for labs and diagnostics:   Follow up with pcp in 1 week Dr. Crawford       Day of Discharge     Vital Signs:  Temp:  [97.2 °F (36.2 °C)-98.5 °F (36.9 °C)] 97.3 °F (36.3 °C)  Heart Rate:  [63-97] 72  Resp:  [17-18] 18  BP: (112-136)/(50-68) 136/65  Physical Exam:    Constitutional: Awake, alert, no acute distress up and walking around the room               Eyes: Pupils equal, sclerae anicteric, no conjunctival injection              HENT: NCAT, mucous membranes moist              Neck: Supple,              Respiratory: Clear to auscultation bilaterally, nonlabored respirations  No w/r/r               Cardiovascular: RRR, no murmurs              Gastrointestinal: Positive bowel sounds, soft, nontender, nondistended              Musculoskeletal: full rom of extremities               Psychiatric: Appropriate affect, cooperative              Neurologic: Oriented x 3, strength symmetric in all extremities, Cranial Nerves grossly intact to confrontation, speech clear              Skin:  No rashes       Discharge Details        Discharge Medications      Changes to Medications      Instructions Start Date   busPIRone 7.5 MG tablet  Commonly known as: BUSPAR  What changed: See the new instructions.   TAKE ONE TABLET BY MOUTH THREE TIMES DAY      insulin detemir 100 UNIT/ML injection  Commonly known as: LEVEMIR  What changed: how much to take   35 Units, Subcutaneous, 2 Times Daily      sertraline 25 MG tablet  Commonly known as: ZOLOFT  What changed:   · See the new instructions.  · Another medication with the same name was removed. Continue taking this medication, and follow the directions you see here.   TAKE ONE TABLET BY MOUTH EVERY NIGHT AT BEDTIME (ALONG WITH 50MG TABLET)      spironolactone 100 MG tablet  Commonly known as: Aldactone  What changed: how much to take   100 mg, Oral, Daily         Continue These Medications      Instructions Start Date   albuterol sulfate  (90 Base) MCG/ACT inhaler  Commonly known as: PROVENTIL HFA;VENTOLIN HFA;PROAIR HFA   2 puffs, Inhalation, Every 4 Hours PRN      atorvastatin 40 MG tablet  Commonly known as: LIPITOR   40 mg, Oral, Daily      BD Pen Needle Kasey U/F 32G X 4 MM misc  Generic drug: Insulin Pen Needle   USE TO INJECT INSULIN FOUR TIMES A DAY      cetirizine 10 MG tablet  Commonly known as: zyrTEC   10 mg, Oral, Daily      Flovent  MCG/ACT inhaler  Generic drug: fluticasone   1 puff, Inhalation, 2 Times Daily - RT      FreeStyle Simona 2 [place] device   1 Device, Does not apply, Daily      FreeStyle Simona 2 Sensor misc   1 Device, Does not apply, Every 14 Days      furosemide 40 MG tablet  Commonly known as: LASIX   40 mg, Oral, 2 Times Daily      Generlac 10 GM/15ML solution solution (encephalopathy)  Generic drug: lactulose   TAKE 30 ML BY MOUTH 3 (THREE) TIMES A DAY.      HumaLOG KwikPen 100 UNIT/ML solution pen-injector  Generic drug: Insulin Lispro (1 Unit Dial)   0-7 Units, Subcutaneous, 3 Times Daily Before Meals      omeprazole  40 MG capsule  Commonly known as: priLOSEC   40 mg, Oral, 2 Times Daily      ondansetron ODT 4 MG disintegrating tablet  Commonly known as: ZOFRAN-ODT   4 mg, Translingual, Every 8 Hours PRN      polyethyl glycol-propyl glycol 0.4-0.3 % solution ophthalmic solution (artificial tears)  Commonly known as: SYSTANE   2 drops, Both Eyes, Every 1 Hour PRN      potassium chloride 20 MEQ CR tablet  Commonly known as: K-DUR,KLOR-CON   20 mEq, Oral, Daily      riFAXIMin 550 MG tablet  Commonly known as: XIFAXAN   550 mg, Oral, Every 12 Hours Scheduled      rOPINIRole 1 MG tablet  Commonly known as: REQUIP   1 mg, Oral, Nightly, take 1 hour before bedtime         Stop These Medications    magnesium oxide 400 MG tablet  Commonly known as: MAG-OX     oxyCODONE-acetaminophen 5-325 MG per tablet  Commonly known as: PERCOCET     prochlorperazine 10 MG tablet  Commonly known as: COMPAZINE     propranolol  MG 24 hr capsule  Commonly known as: Inderal LA            No Known Allergies    Discharge Disposition:  Home-Health Care [facility]    Diet:  Hospital:  Diet Order   Procedures   • Diet Regular; Consistent Carbohydrate       Discharge Activity:  As tolerated       CODE STATUS:  Code Status and Medical Interventions:   Ordered at: 11/04/22 1623     Level Of Support Discussed With:    Patient     Code Status (Patient has no pulse and is not breathing):    CPR (Attempt to Resuscitate)     Medical Interventions (Patient has pulse or is breathing):    Full Support         Future Appointments   Date Time Provider Department Center   11/30/2022  1:15 PM Giuliana Leonardo [facility]SHAD [facility] [place] [place]   12/6/2022  9:30 AM Erin Chavez APR[facility]ILSHAD [facility] [place] ETW [place]   1/4/2023  1:45 PM Oanh Weldon APRN [facility] [place] ETRING [facility]   1/26/2023  1:30 PM Alina Crawford DO [facility] [place] SAM [place]   4/18/2023  9:00 AM NURSE/MA ONC ETOWN [facility] [place]C E521 [place]   4/20/2023  9:15 AM Juan Jose Sanchez MD [facility]C E521 [place]           Pertinent  and/or Most Recent Results      PROCEDURES:   None     LAB RESULTS:      Lab 11/07/22  0436 11/06/22 0432 11/05/22 0455 11/04/22 1731 11/04/22  1207   WBC 3.71 4.70 3.80  --  3.85   HEMOGLOBIN 9.3* 9.1* 9.0*  --  9.4*   HEMATOCRIT 29.0* 26.9* 27.1*  --  28.5*   PLATELETS 71* 69* 72*  --  80*   NEUTROS ABS 2.25 3.00 1.89  --  2.50   IMMATURE GRANS (ABS) 0.01 0.02 0.01  --  0.01   LYMPHS ABS 0.86 0.92 1.20  --  0.79   MONOS ABS 0.41 0.53 0.51  --  0.42   EOS ABS 0.15 0.19 0.15  --  0.10   MCV 97.6* 92.4 93.8  --  94.4   PROCALCITONIN  --   --   --  0.10  --          Lab 11/07/22  0436 11/06/22 0432 11/05/22 0455 11/04/22 1731 11/04/22  1207   SODIUM 134* 136 140  --  137   POTASSIUM 4.5 4.5 4.3  --  4.4   CHLORIDE 104 106 109*  --  106   CO2 21.0* 23.0 23.2  --  20.0*   ANION GAP 9.0 7.0 7.8  --  11.0   BUN 23* 21* 17  --  14   CREATININE 0.98 1.13 1.14  --  0.97   EGFR 90.5 76.3 75.5  --  91.6   GLUCOSE 173* 206* 92  --  427*   CALCIUM 9.0 8.9 8.6  --  8.7   MAGNESIUM 1.5* 1.8 1.9 1.7 1.4*   PHOSPHORUS  --  4.2 4.8* 3.8  --          Lab 11/04/22  1731 11/04/22  1207   TOTAL PROTEIN  --  5.9*   ALBUMIN  --  3.00*   GLOBULIN  --  2.9   ALT (SGPT)  --  20   AST (SGOT)  --  33   BILIRUBIN  --  1.7*   ALK PHOS  --  86   LIPASE 55  --          Lab 11/04/22  1207   TROPONIN T <0.010             Lab 11/04/22  1731   IRON 75   IRON SATURATION 18*   TIBC 414   TRANSFERRIN 278   FERRITIN 53.85   FOLATE >20.00   VITAMIN B 12 619         Brief Urine Lab Results  (Last result in the past 365 days)      Color   Clarity   Blood   Leuk Est   Nitrite   Protein   CREAT   Urine HCG        11/04/22 2056 Yellow   Clear   Negative   Negative   Negative   Negative               Microbiology Results (last 10 days)     Procedure Component Value - Date/Time    S. Pneumo Ag Urine or CSF - Urine, Urine, Clean Catch [[id number]]  (Normal) Collected: 11/04/22 2056    Lab Status: Final result Specimen: Urine, Clean Catch Updated: 11/05/22 0756     Strep Pneumo Ag  Negative    Legionella Antigen, Urine - Urine, Urine, Clean Catch [[id number]]  (Normal) Collected: 11/04/22 2056    Lab Status: Final result Specimen: Urine, Clean Catch Updated: 11/05/22 0756     LEGIONELLA ANTIGEN, URINE Negative    Influenza Antigen, Rapid - Swab, Nasopharynx [[id number]]  (Normal) Collected: 11/04/22 2020    Lab Status: Final result Specimen: Swab from Nasopharynx Updated: 11/04/22 2055     Influenza A Ag, EIA Negative     Influenza B Ag, EIA Negative    Respiratory Panel PCR w/COVID-19(SARS-CoV-2) [facility]AN/LE/[facility] In-House, NP Swab in UTM/VTM, 3-4 HR TAT - Swab, Nasopharynx [[id number]]  (Normal) Collected: 11/04/22 2003    Lab Status: Final result Specimen: Swab from Nasopharynx Updated: 11/04/22 2140     ADENOVIRUS, PCR Not Detected     Coronavirus 229E Not Detected     Coronavirus HKU1 Not Detected     Coronavirus NL63 Not Detected     Coronavirus OC43 Not Detected     COVID19 Not Detected     Human Metapneumovirus Not Detected     Human Rhinovirus/Enterovirus Not Detected     Influenza A PCR Not Detected     Influenza B PCR Not Detected     Parainfluenza Virus 1 Not Detected     Parainfluenza Virus 2 Not Detected     Parainfluenza Virus 3 Not Detected     Parainfluenza Virus 4 Not Detected     RSV, PCR Not Detected     Bordetella pertussis pcr Not Detected     Bordetella parapertussis PCR Not Detected     Chlamydophila pneumoniae PCR Not Detected     Mycoplasma pneumo by PCR Not Detected    Narrative:      In the setting of a positive respiratory panel with a viral infection PLUS a negative procalcitonin without other underlying concern for bacterial infection, consider observing off antibiotics or discontinuation of antibiotics and continue supportive care. If the respiratory panel is positive for atypical bacterial infection (Bordetella pertussis, Chlamydophila pneumoniae, or Mycoplasma pneumoniae), consider antibiotic de-escalation to target atypical bacterial infection.     Blood Culture - Blood, Arm, Right [[id number]]  (Normal) Collected: 11/04/22 1733    Lab Status: Preliminary result Specimen: Blood from Arm, Right Updated: 11/06/22 1701     Blood Culture No growth at 2 days    Blood Culture - Blood, Arm, Left [[id number]]  (Normal) Collected: 11/04/22 1731    Lab Status: Preliminary result Specimen: Blood from Arm, Left Updated: 11/06/22 1701     Blood Culture No growth at 2 days          XR Spine Lumbar 2 or 3 View    Result Date: 10/31/2022  Impression:   1. Multilevel degenerative disc disease of the lumbar spine most severe at L5-S1 with degenerative facet arthropathy of the lower lumbar levels.  2. Postsurgical changes of prior internal fixation of the left iliac bone.     HUGH FISCHER MD       Electronically Signed and Approved By: HUGH FISCHER MD on 10/31/2022 at 12:58             XR Chest 1 View    Result Date: 11/4/2022  Impression:  Evidence for potential developing right lower lobe pneumonia. Recommend correlation for signs or symptoms of acute infection and followup to resolution.         ASHLEY HIDALGO MD       Electronically Signed and Approved By: ASHLEY HIDALGO MD on 11/04/2022 at 12:56             XR Chest 1 View    Result Date: 10/31/2022  Impression:   1. No acute cardiopulmonary abnormality.        HUGH FISCHER MD       Electronically Signed and Approved By: HUGH FISCHER MD on 10/31/2022 at 9:53               Results for orders placed during the hospital encounter of 02/24/22    Duplex Carotid Ultrasound CAR    Interpretation Summary  · No significant stenosis is present in carotid bifurcations bilaterally.  · There are right mid internal carotid artery velocity elevations that would meet criteria for mild to moderate stenosis, however, this appears to be related to tortuosity in this region.  · No significant plaque in the bifurcations bilaterally.  · Vertebral flow is antegrade bilaterally.      Results for orders placed during the hospital  encounter of 02/24/22    Duplex Carotid Ultrasound CAR    Interpretation Summary  · No significant stenosis is present in carotid bifurcations bilaterally.  · There are right mid internal carotid artery velocity elevations that would meet criteria for mild to moderate stenosis, however, this appears to be related to tortuosity in this region.  · No significant plaque in the bifurcations bilaterally.  · Vertebral flow is antegrade bilaterally.      Results for orders placed during the hospital encounter of 01/19/22    Adult Transthoracic Echo Complete W/ Cont if Necessary Per Protocol (With Agitated Saline)    Interpretation Summary  · Saline test results are negative.  · Estimated left ventricular EF = 59% Left ventricular systolic function is normal.  · Left ventricular diastolic function was normal.      Labs Pending at Discharge:  Pending Labs     Order Current Status    Blood Culture - Blood, Arm, Left Preliminary result    Blood Culture - Blood, Arm, Right Preliminary result            Time spent on Discharge including face to face service:  More than 35  minutes    Electronically signed by Jay Bridges DO, 11/07/22, 1:10 PM EST.
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
## 2022-11-07 NOTE — PLAN OF CARE
Goal Outcome Evaluation:  Plan of Care Reviewed With: (P) patient           Outcome Evaluation: (P) Pt currently presents with balance, transfer, and ambulation deficits. Skilled physical therapy is necessary to address these deficits in order for the patient to optimize function and ambulate safely. Reccomendation subacute rehab facility prior to returning to home with home health.

## 2022-11-07 NOTE — OUTREACH NOTE
Prep Survey    Flowsheet Row Responses   [facility] patient discharged from? [facility]   Is LACE score < 7 ? No   Emergency Room discharge w/ pulse ox? No   Eligibility TCM   [facility]   Date of Admission 11/04/22   Date of Discharge 11/07/22   Discharge Disposition Home-Health Care [facility]   Discharge diagnosis Hepatic encephalopathy, possible developing RLL PNA, T2DM   Does the patient have one of the following disease processes/diagnoses(primary or secondary)? Other   Does the patient have Home health ordered? No   Is there a DME ordered? No   Prep survey completed? Yes          TRISH SORTO - Registered Nurse

## 2022-11-07 NOTE — PLAN OF CARE
Problem: Adult Inpatient Plan of Care  Goal: Plan of Care Review  Outcome: Met  Flowsheets (Taken 11/7/2022 1420)  Progress: improving  Plan of Care Reviewed With: patient  Outcome Evaluation: Pt alert and oriented throughout shift. Pain treated per MAR. Pt showered on shift and has been ad myrtle with no deficits or gait disturbances noted. Pt to discharge home later this shift.   Goal Outcome Evaluation:  Plan of Care Reviewed With: patient        Progress: improving  Outcome Evaluation: Pt alert and oriented throughout shift. Pain treated per MAR. Pt showered on shift and has been ad myrtle with no deficits or gait disturbances noted. Pt to discharge home later this shift.

## 2022-11-07 NOTE — PLAN OF CARE
Problem: Adult Inpatient Plan of Care  Goal: Plan of Care Review  Outcome: Ongoing, Progressing  Flowsheets (Taken 11/7/2022 0358)  Progress: improving  Plan of Care Reviewed With: patient  Outcome Evaluation: No acute distress noted this shift, c/o trouble sleeping PRN Melatonin given and pt verbalized effectiveness. Did request pain medication for chronic back pain, VSS and is on RA. Pt is up ad myrtle, steady with no gait disturbances noted. Blood sugars controlled at this time. Pt expressed interest and desire to discharge to [place]. Continue with POC.  Goal: Patient-Specific Goal (Individualized)  Outcome: Ongoing, Progressing  Goal: Absence of Hospital-Acquired Illness or Injury  Outcome: Ongoing, Progressing  Intervention: Identify and Manage Fall Risk  Recent Flowsheet Documentation  Taken 11/7/2022 0200 by Thorn, Erinne, RN  Safety Promotion/Fall Prevention: safety round/check completed  Taken 11/7/2022 0000 by Thorn, Erinne, RN  Safety Promotion/Fall Prevention: safety round/check completed  Taken 11/6/2022 2200 by Thorn, Erinne, RN  Safety Promotion/Fall Prevention: safety round/check completed  Taken 11/6/2022 2000 by Thorn, Erinne, RN  Safety Promotion/Fall Prevention: safety round/check completed  Intervention: Prevent Skin Injury  Recent Flowsheet Documentation  Taken 11/6/2022 2000 by Thorn, Erinne, RN  Body Position:   position changed independently   sitting up in bed  Skin Protection:   adhesive use limited   tubing/devices free from skin contact  Intervention: Prevent and Manage VTE (Venous Thromboembolism) Risk  Recent Flowsheet Documentation  Taken 11/6/2022 2000 by Thorn, Erinne, RN  Activity Management:   activity adjusted per tolerance   activity encouraged   ambulated in room   ambulated to bathroom   back to bed  VTE Prevention/Management: (see MAR) other (see comments)  Range of Motion: active ROM (range of motion) encouraged  Intervention: Prevent Infection  Recent Flowsheet  Documentation  Taken 11/7/2022 0200 by Thorn, Erinne, RN  Infection Prevention:   single patient room provided   rest/sleep promoted   hand hygiene promoted  Taken 11/7/2022 0000 by Thorn, Erinne, RN  Infection Prevention:   single patient room provided   rest/sleep promoted   hand hygiene promoted  Taken 11/6/2022 2200 by Thorn, Erinne, RN  Infection Prevention:   single patient room provided   rest/sleep promoted   hand hygiene promoted  Taken 11/6/2022 2000 by Thorn, Erinne, RN  Infection Prevention:   single patient room provided   rest/sleep promoted   hand hygiene promoted  Goal: Optimal Comfort and Wellbeing  Outcome: Ongoing, Progressing  Intervention: Monitor Pain and Promote Comfort  Recent Flowsheet Documentation  Taken 11/6/2022 2000 by Thorn, Erinne, RN  Pain Management Interventions:   diversional activity provided   pain management plan reviewed with patient/caregiver   pillow support provided   position adjusted   quiet environment facilitated  Intervention: Provide Person-Centered Care  Recent Flowsheet Documentation  Taken 11/6/2022 2000 by Thorn, Erinne, RN  Trust Relationship/Rapport:   care explained   choices provided   thoughts/feelings acknowledged  Goal: Readiness for Transition of Care  Outcome: Ongoing, Progressing     Problem: Skin Injury Risk Increased  Goal: Skin Health and Integrity  Outcome: Ongoing, Progressing  Intervention: Optimize Skin Protection  Recent Flowsheet Documentation  Taken 11/6/2022 2000 by Thorn, Erinne, RN  Pressure Reduction Techniques: frequent weight shift encouraged  Head of Bed (HOB) Positioning: HOB lowered  Pressure Reduction Devices:   positioning supports utilized   pressure-redistributing mattress utilized  Skin Protection:   adhesive use limited   tubing/devices free from skin contact     Problem: Fall Injury Risk  Goal: Absence of Fall and Fall-Related Injury  Outcome: Ongoing, Progressing  Intervention: Identify and Manage Contributors  Recent Flowsheet  Documentation  Taken 11/7/2022 0200 by Thorn, Erinne, RN  Medication Review/Management: medications reviewed  Taken 11/7/2022 0000 by Thorn, Erinne, RN  Medication Review/Management: medications reviewed  Taken 11/6/2022 2200 by Thorn, Erinne, RN  Medication Review/Management: medications reviewed  Taken 11/6/2022 2000 by Thorn, Erinne, RN  Medication Review/Management: medications reviewed  Self-Care Promotion: independence encouraged  Intervention: Promote Injury-Free Environment  Recent Flowsheet Documentation  Taken 11/7/2022 0200 by Thorn, Erinne, RN  Safety Promotion/Fall Prevention: safety round/check completed  Taken 11/7/2022 0000 by Thorn, Erinne, RN  Safety Promotion/Fall Prevention: safety round/check completed  Taken 11/6/2022 2200 by Thorn, Erinne, RN  Safety Promotion/Fall Prevention: safety round/check completed  Taken 11/6/2022 2000 by Thorn, Erinne, RN  Safety Promotion/Fall Prevention: safety round/check completed     Problem: Adjustment to Illness (Liver Failure)  Goal: Optimal Coping with Liver Failure  Outcome: Ongoing, Progressing  Intervention: Support Response to Liver Disease  Recent Flowsheet Documentation  Taken 11/6/2022 2000 by Thorn, Erinne, RN  Supportive Measures:   active listening utilized   self-care encouraged   positive reinforcement provided  Diversional Activities: television  Family/Support System Care: self-care encouraged     Problem: Fluid and Electrolyte Imbalance (Liver Failure)  Goal: Fluid and Electrolyte Balance  Outcome: Ongoing, Progressing     Problem: Gastrointestinal Complications (Liver Failure)  Goal: Optimal Gastrointestinal Function  Outcome: Ongoing, Progressing  Intervention: Monitor and Support Gastrointestinal Function  Recent Flowsheet Documentation  Taken 11/6/2022 2000 by Thorn, Erinne, RN  Body Position:   position changed independently   sitting up in bed     Problem: Impaired Coagulation (Liver Failure)  Goal: Optimal Coagulation Function  Outcome:  Ongoing, Progressing     Problem: Infection (Liver Failure)  Goal: Absence of Infection Signs and Symptoms  Outcome: Ongoing, Progressing     Problem: Neurologic Function Impaired (Liver Failure)  Goal: Optimal Neurologic Function  Outcome: Ongoing, Progressing  Intervention: Monitor and Optimize Neurologic Status  Recent Flowsheet Documentation  Taken 11/6/2022 2000 by Thorn, Erinne, RN  Sensory Stimulation Regulation:   television on   tactile stimulation minimized   quiet environment promoted     Problem: Oral Intake Inadequate (Liver Failure)  Goal: Improved Oral Intake  Outcome: Ongoing, Progressing  Intervention: Promote and Optimize Nutrition  Recent Flowsheet Documentation  Taken 11/6/2022 2000 by Thorn, Erinne, RN  Environmental Support:   calm environment promoted   distractions minimized     Problem: Pain (Liver Failure)  Goal: Optimal Pain Control  Outcome: Ongoing, Progressing  Intervention: Prevent or Manage Pain  Recent Flowsheet Documentation  Taken 11/6/2022 2000 by Thorn, Erinne, RN  Pain Management Interventions:   diversional activity provided   pain management plan reviewed with patient/caregiver   pillow support provided   position adjusted   quiet environment facilitated  Sleep/Rest Enhancement:   awakenings minimized   room darkened   noise level reduced     Problem: Renal Dysfunction (Liver Failure)  Goal: Optimize Renal Function  Outcome: Ongoing, Progressing     Problem: Respiratory Compromise (Liver Failure)  Goal: Effective Oxygenation and Ventilation  Outcome: Ongoing, Progressing  Intervention: Optimize Oxygenation and Ventilation  Recent Flowsheet Documentation  Taken 11/6/2022 2000 by Thorn, Erinne, RN  Head of Bed (HOB) Positioning: HOB lowered  Airway/Ventilation Management: airway patency maintained  Intervention: Promote Airway Secretion Clearance  Recent Flowsheet Documentation  Taken 11/6/2022 2000 by Thorn, Erinne, RN  Activity Management:   activity adjusted per tolerance    activity encouraged   ambulated in room   ambulated to bathroom   back to bed  Breathing Techniques/Airway Clearance:   deep/controlled cough encouraged   pursed-lip breathing encouraged  Cough And Deep Breathing: done with encouragement     Problem: Hyperglycemia  Goal: Blood Glucose Level Within Targeted Range  Outcome: Ongoing, Progressing  Intervention: Optimize Glycemic Control  Recent Flowsheet Documentation  Taken 11/6/2022 2000 by Thorn, Erinne, RN  Glycemic Management: blood glucose monitored   Goal Outcome Evaluation:  Plan of Care Reviewed With: patient        Progress: improving  Outcome Evaluation: No acute distress noted this shift, c/o trouble sleeping PRN Melatonin given and pt verbalized effectiveness. Did request pain medication for chronic back pain, VSS and is on RA. Pt is up ad myrtle, steady with no gait disturbances noted. Blood sugars controlled at this time. Pt expressed interest and desire to discharge to [place]. Continue with POC.

## 2022-11-08 ENCOUNTER — TRANSITIONAL CARE MANAGEMENT TELEPHONE ENCOUNTER (OUTPATIENT)
Dept: CALL CENTER | Facility: HOSPITAL | Age: 57
End: 2022-11-08

## 2022-11-08 NOTE — OUTREACH NOTE
"Call Center TCM Note    Flowsheet Row Responses   [facility] patient discharged from? [facility]   Does the patient have one of the following disease processes/diagnoses(primary or secondary)? Other   TCM attempt successful? Yes  [verbal release for sister Medina Manjarrez]   Call start time 1023   Call end time 1026   Person spoke with today (if not patient) and relationship sister Haney   Medication comments sister reports pt has reviewed--unable to review in full at time of call   Comments Hospital PCP FOLLOW UP APPOINTMENT IS 11/11/22@345pm   Does the patient have an appointment with their PCP within 7 days of discharge? Yes   What is the patient's perception of their health status since discharge? Improving  [Unable to complete call in full as sister/patient were in grocery--sister reports pt improving for \"now.\"  Pt confirmed received AVS w/medication changes and confirmed PCP f/u and aware to call PCP with any new concerns.]   Is the patient/caregiver able to teach back signs and symptoms related to disease process for when to call PCP? Yes   TCM call completed? Yes   Call end time 1026          Araceli Loya RN    11/8/2022, 10:29 EST      "

## 2022-11-09 ENCOUNTER — TELEPHONE (OUTPATIENT)
Dept: CASE MANAGEMENT | Facility: OTHER | Age: 57
End: 2022-11-09

## 2022-11-09 LAB
BACTERIA SPEC AEROBE CULT: NORMAL
BACTERIA SPEC AEROBE CULT: NORMAL

## 2022-11-09 NOTE — TELEPHONE ENCOUNTER
Called for CCM services and left detailed message for him to call back to my work cell, he was discharged from hospital to home per chart review. [facility] would not accept his insurance per discharge summary.

## 2022-11-10 ENCOUNTER — PATIENT OUTREACH (OUTPATIENT)
Dept: CASE MANAGEMENT | Facility: OTHER | Age: 57
End: 2022-11-10

## 2022-11-10 ENCOUNTER — TELEPHONE (OUTPATIENT)
Dept: FAMILY MEDICINE CLINIC | Facility: CLINIC | Age: 57
End: 2022-11-10

## 2022-11-10 DIAGNOSIS — I63.9 CEREBROVASCULAR ACCIDENT (CVA), UNSPECIFIED MECHANISM: ICD-10-CM

## 2022-11-10 DIAGNOSIS — R73.9 ELEVATED BLOOD SUGAR: ICD-10-CM

## 2022-11-10 DIAGNOSIS — K74.60 CIRRHOSIS OF LIVER WITH ASCITES, UNSPECIFIED HEPATIC CIRRHOSIS TYPE: Primary | ICD-10-CM

## 2022-11-10 DIAGNOSIS — K76.82 HEPATIC ENCEPHALOPATHY: ICD-10-CM

## 2022-11-10 DIAGNOSIS — R18.8 CIRRHOSIS OF LIVER WITH ASCITES, UNSPECIFIED HEPATIC CIRRHOSIS TYPE: Primary | ICD-10-CM

## 2022-11-10 PROCEDURE — 99439 CHRNC CARE MGMT STAF EA ADDL: CPT | Performed by: FAMILY MEDICINE

## 2022-11-10 PROCEDURE — 99490 CHRNC CARE MGMT STAFF 1ST 20: CPT | Performed by: FAMILY MEDICINE
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
## 2022-11-10 NOTE — TELEPHONE ENCOUNTER
Spoke with Sher from [facility]. Patient was seen today and they will be sending an order for social work.     Sher was asking about patient's discharge summary and results from chest xray. patient has a productive cough and xray results from 11/04 suggest evidence of potential developing RLL pneumonia    Also, just for us to know, patient was mixing up long acting and short acting insulin as far as dosing and when to take medication. Sher educated patient on which to take and made sure the labels on medication were legible for patient    Patient has follow up on 11/11

## 2022-11-10 NOTE — OUTREACH NOTE
AMBULATORY CASE MANAGEMENT NOTE    Name and Relationship of Patient/Support Person: Nic Nicolas Jackson - Self     CCM Interim Update    Called to re-initiate CCM services, He changed practices and was stopped in CCM from Dr. Franco's office due to being let go from practice and consented to CCM today with Dr. Crawford's office. He has his in patient follow up tomorrow. Will do a face to face visit at next follow up (after tomorrow) to complete ACP paperwork. Reports he sees pain management in [place] but unable to drive up there and needs something for pain, unable to be seen at [facility] in [place], [facility] to see if referral local would be an option.         Adult Patient Profile  Questions/Answers    Flowsheet Row Most Recent Value   Symptoms/Conditions Managed at Home diabetes, type 2, musculoskeletal, neurological, gastrointestinal   Barriers to Managing Health literacy, stress of chronic illness   Diabetes Management Strategies medication therapy, blood glucose testing   Frequency of Blood Glucose Testing before meals and at bedtime   Usual Blood Glucose normally below 200 was 384 this moning and wanting to get CGM   Last A1C Result 7/8/22 6.3%   Diabetes Self-Management Outcome 4 (good)   Gastrointestinal Management Strategies medication therapy, other (see comments)  [fatty liver]   Gastrointestinal Self-Management Outcome 3 (uncertain)   Musculoskeletal Symptoms/Conditions frailty syndrome, mobility limited, unsteady gait, other (see comments)  [uses cane and reports falls  lot, unable to get into a rehab facility]   Musculoskeletal Self-Management Outcome 2 (bad)   Musculoskeletal Comment falling and having pain   Neurological Symptoms/Conditions other (see comments)  [hepatic encepalopathy]   Neurological Self-Management Outcome 3 (uncertain)   Neurological Comment reports does not remeber things well and cannot make notes or write things down   How Well Do  You Speak English? well   Source of Information patient   Patient Aware of Diagnosis yes   Pre-existing AND/MOST/POLST Order No   Advance Directive Status Patient does not have advance directive   Fall Risk Category High   Primary Source of Support/Comfort sibling(s)   Name of Support/Comfort Primary Source has children but they do not communicate, his sister is his only support   People in Home alone   Family Caregiver if Needed sibling(s)        Social Work Assessment  Questions/Answers    Flowsheet Row Most Recent Value   People in Home alone   Family Caregiver if Needed sibling(s)        Send Education  Questions/Answers    Flowsheet Row Most Recent Value   Annual Wellness Visit:  Patient Has Completed   Other Patient Education/Resources  Home Healthcare, Advanced Care Planning, MyChart  [[employer] and Sher is his nurse]   ACP Education Method Verbal   Home Healthcare Education Method Verbal   MyChart Education Method --  [no computer or ability to use my chart]   Advanced Directives: Send Materials  [will review with him at a face to face visit]        SDOH updated and reviewed with the patient during this program:  Physical Activity: Inactive   • Days of Exercise per Week: 0 days   • Minutes of Exercise per Session: 0 min      Social Connections: Socially Isolated   • Frequency of Communication with Friends and Family: Once a week   • Frequency of Social Gatherings with Friends and Family: Never   • Attends [employer] Services: Never   • Active Member of Clubs or Organizations: No   • Attends Club or Organization Meetings: Never   • Marital Status:       Stress: Stress Concern Present   • Feeling of Stress : Very much               CHAPIS H  Ambulatory Case Management    11/10/2022, 15:04 EST

## 2022-11-11 ENCOUNTER — HOSPITAL ENCOUNTER (OUTPATIENT)
Dept: GENERAL RADIOLOGY | Facility: HOSPITAL | Age: 57
Discharge: HOME | End: 2022-11-11
Admitting: FAMILY MEDICINE

## 2022-11-11 ENCOUNTER — OFFICE VISIT (OUTPATIENT)
Dept: FAMILY MEDICINE CLINIC | Facility: CLINIC | Age: 57
End: 2022-11-11

## 2022-11-11 VITALS
HEART RATE: 66 BPM | DIASTOLIC BLOOD PRESSURE: 65 MMHG | HEIGHT: 68 IN | OXYGEN SATURATION: 99 % | BODY MASS INDEX: 36.84 KG/M2 | SYSTOLIC BLOOD PRESSURE: 136 MMHG | RESPIRATION RATE: 18 BRPM | TEMPERATURE: 97.8 F | WEIGHT: 243.1 LBS

## 2022-11-11 DIAGNOSIS — R05.1 ACUTE COUGH: ICD-10-CM

## 2022-11-11 DIAGNOSIS — R18.8 CIRRHOSIS OF LIVER WITH ASCITES, UNSPECIFIED HEPATIC CIRRHOSIS TYPE: ICD-10-CM

## 2022-11-11 DIAGNOSIS — E66.09 CLASS 2 OBESITY DUE TO EXCESS CALORIES WITHOUT SERIOUS COMORBIDITY WITH BODY MASS INDEX (BMI) OF 36.0 TO 36.9 IN ADULT: Chronic | ICD-10-CM

## 2022-11-11 DIAGNOSIS — E11.65 TYPE 2 DIABETES MELLITUS WITH HYPERGLYCEMIA, WITHOUT LONG-TERM CURRENT USE OF INSULIN: Primary | Chronic | ICD-10-CM

## 2022-11-11 DIAGNOSIS — K74.60 CIRRHOSIS OF LIVER WITH ASCITES, UNSPECIFIED HEPATIC CIRRHOSIS TYPE: ICD-10-CM

## 2022-11-11 DIAGNOSIS — I10 PRIMARY HYPERTENSION: Chronic | ICD-10-CM

## 2022-11-11 PROBLEM — E66.812 CLASS 2 SEVERE OBESITY DUE TO EXCESS CALORIES WITH SERIOUS COMORBIDITY AND BODY MASS INDEX (BMI) OF 35.0 TO 35.9 IN ADULT: Chronic | Status: RESOLVED | Noted: 2022-09-08 | Resolved: 2022-11-11

## 2022-11-11 PROBLEM — E66.01 CLASS 2 SEVERE OBESITY DUE TO EXCESS CALORIES WITH SERIOUS COMORBIDITY AND BODY MASS INDEX (BMI) OF 35.0 TO 35.9 IN ADULT: Chronic | Status: RESOLVED | Noted: 2022-09-08 | Resolved: 2022-11-11

## 2022-11-11 PROBLEM — R73.9 ELEVATED BLOOD SUGAR: Status: RESOLVED | Noted: 2022-09-08 | Resolved: 2022-11-11

## 2022-11-11 PROCEDURE — 71046 X-RAY EXAM CHEST 2 VIEWS: CPT

## 2022-11-11 PROCEDURE — 99214 OFFICE O/P EST MOD 30 MIN: CPT | Performed by: FAMILY MEDICINE

## 2022-11-11 RX ORDER — FLUTICASONE PROPIONATE 50 MCG
2 SPRAY, SUSPENSION (ML) NASAL DAILY
Qty: 18.2 ML | Refills: 11 | Status: SHIPPED | OUTPATIENT
Start: 2022-11-11 | End: 2023-01-20 | Stop reason: SDUPTHER

## 2022-11-11 RX ORDER — ALBUTEROL SULFATE 90 UG/1
2 AEROSOL, METERED RESPIRATORY (INHALATION) EVERY 4 HOURS PRN
Qty: 18 G | Refills: 11 | Status: SHIPPED | OUTPATIENT
Start: 2022-11-11 | End: 2023-01-20 | Stop reason: SDUPTHER

## 2022-11-11 NOTE — PROGRESS NOTES
"Chief Complaint  Hospital Follow Up Visit and Cough (Coughing up pink )    Subjective        Nic Nicolas presents to [facility]  History of Present Illness  He is here today for management of his chronic medical conditions.  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     He does not check his blood sugar on a daily basis.      The patient is continuing to have chronic pain in his various joints.  He has been seen by pain management and is awaiting further treatment once his recent A1c's have been reviewed.    His blood sugar is improving. It is running from 160 to 300.      The patient has no other complaints today and denies chest pain, shortness of breath, weakness, numbness, nausea, vomiting, diarrhea, dizziness or syncopal event.      Objective   Vital Signs:  /65 (BP Location: Left arm, Patient Position: Sitting)   Pulse 66   Temp 97.8 °F (36.6 °C)   Resp 18   Ht 172.7 cm (67.99\")   Wt 110 kg (243 lb 1.6 oz)   SpO2 99%   BMI 36.97 kg/m²   Estimated body mass index is 36.97 kg/m² as calculated from the following:    Height as of this encounter: 172.7 cm (67.99\").    Weight as of this encounter: 110 kg (243 lb 1.6 oz).          Physical Exam  Vitals reviewed.   Constitutional:       Appearance: Normal appearance. He is well-developed. He is morbidly obese.   HENT:      Head: Normocephalic and atraumatic.      Right Ear: External ear normal.      Left Ear: External ear normal.      Mouth/Throat:      Pharynx: No oropharyngeal exudate.   Eyes:      Conjunctiva/sclera: Conjunctivae normal.      Pupils: Pupils are equal, round, and reactive to light.   Neck:      Vascular: No carotid bruit.   Cardiovascular:      Rate and " Rhythm: Normal rate and regular rhythm.      Heart sounds: No murmur heard.    No friction rub. No gallop.   Pulmonary:      Effort: Pulmonary effort is normal.      Breath sounds: Normal breath sounds. No wheezing or rhonchi.   Abdominal:      General: There is no distension.   Skin:     General: Skin is warm and dry.   Neurological:      Mental Status: He is alert and oriented to person, place, and time.      Cranial Nerves: No cranial nerve deficit.      Motor: No weakness.   Psychiatric:         Mood and Affect: Mood and affect normal.         Behavior: Behavior normal.         Thought Content: Thought content normal.         Judgment: Judgment normal.        Result Review :    CMP    CMP 11/5/22 11/6/22 11/7/22   Glucose 92 206 (A) 173 (A)   BUN 17 21 (A) 23 (A)   Creatinine 1.14 1.13 0.98   Sodium 140 136 134 (A)   Potassium 4.3 4.5 4.5   Chloride 109 (A) 106 104   Calcium 8.6 8.9 9.0   (A) Abnormal value            CBC    CBC 11/5/22 11/6/22 11/7/22   WBC 3.80 4.70 3.71   RBC 2.89 (A) 2.91 (A) 2.97 (A)   Hemoglobin 9.0 (A) 9.1 (A) 9.3 (A)   Hematocrit 27.1 (A) 26.9 (A) 29.0 (A)   MCV 93.8 92.4 97.6 (A)   MCH 31.1 31.3 31.3   MCHC 33.2 33.8 32.1   RDW 15.2 15.3 15.0   Platelets 72 (A) 69 (A) 71 (A)   (A) Abnormal value            Lipid Panel    Lipid Panel 1/20/22 4/12/22 7/8/22   Total Cholesterol 96 110 117   Triglycerides 78 69 31   HDL Cholesterol 35 (A) 63 (A) 59   VLDL Cholesterol 16 15 9   LDL Cholesterol  45 32 49   LDL/HDL Ratio 1.30 0.53 0.88   (A) Abnormal value            TSH    TSH 6/20/22 7/8/22 8/2/22   TSH 6.840 (A) 4.450 (A) 3.170   (A) Abnormal value                      Assessment and Plan   Diagnoses and all orders for this visit:    1. Type 2 diabetes mellitus with hyperglycemia, without long-term current use of insulin (HCC) (Primary)  Assessment & Plan:  Diabetes is improving with treatment.   Continue current treatment regimen.  Dietary recommendations for ADA diet.  Diabetes will be  reassessed in 6 months.      2. Class 2 obesity due to excess calories without serious comorbidity with body mass index (BMI) of 36.0 to 36.9 in adult  Assessment & Plan:  Patient's (Body mass index is 36.97 kg/m².) indicates that they are morbidly obese (BMI > 40 or > 35 with obesity - related health condition) with health conditions that include hypertension and dyslipidemias . Weight is unchanged. BMI is is above average; BMI management plan is completed. We discussed low calorie, low carb based diet program, portion control and increasing exercise.       3. Primary hypertension  Assessment & Plan:  Hypertension is improving with treatment.  Continue current treatment regimen.  Dietary sodium restriction.  Weight loss.  Blood pressure will be reassessed at the next regular appointment.      4. Acute cough  -     XR Chest PA & Lateral; Future    Other orders  -     albuterol sulfate  (90 Base) MCG/ACT inhaler; Inhale 2 puffs Every 4 (Four) Hours As Needed for Wheezing.  Dispense: 18 g; Refill: 11  -     fluticasone (Flonase) 50 MCG/ACT nasal spray; 2 sprays into the nostril(s) as directed by provider Daily.  Dispense: 18.2 mL; Refill: 11           Follow Up   No follow-ups on file.  Patient was given instructions and counseling regarding his condition or for health maintenance advice. Please see specific information pulled into the AVS if appropriate.

## 2022-11-11 NOTE — TELEPHONE ENCOUNTER
"According to discharge summary from hospital,   \"Patient does not have any concerning signs for acute infection such as seen on chest x-ray therefore no antibiotics indicated at this time\"    Patient has follow up with us 11/11  "

## 2022-11-11 NOTE — ASSESSMENT & PLAN NOTE
Patient's (Body mass index is 36.97 kg/m².) indicates that they are morbidly obese (BMI > 40 or > 35 with obesity - related health condition) with health conditions that include hypertension and dyslipidemias . Weight is unchanged. BMI is is above average; BMI management plan is completed. We discussed low calorie, low carb based diet program, portion control and increasing exercise.

## 2022-11-14 ENCOUNTER — TELEPHONE (OUTPATIENT)
Dept: CASE MANAGEMENT | Facility: OTHER | Age: 57
End: 2022-11-14

## 2022-11-14 DIAGNOSIS — M25.562 ARTHRALGIA OF BOTH KNEES: ICD-10-CM

## 2022-11-14 DIAGNOSIS — G89.29 CHRONIC LOW BACK PAIN, UNSPECIFIED BACK PAIN LATERALITY, UNSPECIFIED WHETHER SCIATICA PRESENT: Primary | ICD-10-CM

## 2022-11-14 DIAGNOSIS — M25.561 ARTHRALGIA OF BOTH KNEES: ICD-10-CM

## 2022-11-14 DIAGNOSIS — M54.50 CHRONIC LOW BACK PAIN, UNSPECIFIED BACK PAIN LATERALITY, UNSPECIFIED WHETHER SCIATICA PRESENT: Primary | ICD-10-CM

## 2022-11-14 RX ORDER — POTASSIUM CHLORIDE 20 MEQ/1
TABLET, EXTENDED RELEASE ORAL
Qty: 30 TABLET | Refills: 0 | Status: SHIPPED | OUTPATIENT
Start: 2022-11-14 | End: 2022-12-23

## 2022-11-14 RX ORDER — PEN NEEDLE, DIABETIC 32GX 5/32"
NEEDLE, DISPOSABLE MISCELLANEOUS
Qty: 100 EACH | Refills: 0 | Status: SHIPPED | OUTPATIENT
Start: 2022-11-14 | End: 2022-12-08

## 2022-11-14 NOTE — TELEPHONE ENCOUNTER
Patient reports per CCM call he is fired from [facility] in [place] and is supposed to go to pain management in [place] and he cannot reach his sister for rides and is not able to drive there himself. He is requesting another closer pain management, can he be referred to [facility] in [place]? He denies ever being seen there. Orders pended if you think it is an option for him.

## 2022-11-17 ENCOUNTER — READMISSION MANAGEMENT (OUTPATIENT)
Dept: CALL CENTER | Facility: HOSPITAL | Age: 57
End: 2022-11-17

## 2022-11-17 NOTE — OUTREACH NOTE
"Medical Week 2 Survey    Flowsheet Row Responses   [facility] patient discharged from? [facility]   Does the patient have one of the following disease processes/diagnoses(primary or secondary)? Other   Week 2 attempt successful? Yes   Call start time 0815   Discharge diagnosis Hepatic encephalopathy, possible developing RLL PNA, T2DM   Call end time 0838   Person spoke with today (if not patient) and relationship sister Haney   Medication comments Sister states she unsure if Pt taking meds . She reports that he said he did , however she is not sure.    Home health comments Sister reports that Pt seemed a little more confused yesterday when she took him to the grocery store., but would answer her questions. Call placed to . They stated they will have nurse go out today after calling PCP for additional order to do so.    What is the patient's perception of their health status since discharge? New symptoms unrelated to diagnosis   Additional teach back comments Message sent to  PCP's RN CM as well.    Week 2 Call Completed? Yes   Wrap up additional comments After additional discussion with sister she states that besides being confused, Pt also was slow with his words yesterday and \"just looked different\" . Sister was unable to describe how he looked different. She states \" the look in his eyes\" Also reported Pt was paranoid in store thinking people in next line was talking about him. Denies any weakness. Advised Pt seek medical attention right away given the change in speech and confusion. Sister states she is not at Pt home at this time and will call him and check on him first. Again advised sister that Pt needs to be taken to hospital or call 911.           JEAN CLAUDE NICOLE - Registered Nurse  "

## 2022-11-28 ENCOUNTER — PATIENT OUTREACH (OUTPATIENT)
Dept: CASE MANAGEMENT | Facility: OTHER | Age: 57
End: 2022-11-28

## 2022-11-28 DIAGNOSIS — I63.9 CEREBROVASCULAR ACCIDENT (CVA), UNSPECIFIED MECHANISM: ICD-10-CM

## 2022-11-28 DIAGNOSIS — R18.8 CIRRHOSIS OF LIVER WITH ASCITES, UNSPECIFIED HEPATIC CIRRHOSIS TYPE: ICD-10-CM

## 2022-11-28 DIAGNOSIS — M54.50 CHRONIC LOW BACK PAIN, UNSPECIFIED BACK PAIN LATERALITY, UNSPECIFIED WHETHER SCIATICA PRESENT: Primary | ICD-10-CM

## 2022-11-28 DIAGNOSIS — R73.9 ELEVATED BLOOD SUGAR: ICD-10-CM

## 2022-11-28 DIAGNOSIS — K74.60 CIRRHOSIS OF LIVER WITH ASCITES, UNSPECIFIED HEPATIC CIRRHOSIS TYPE: ICD-10-CM

## 2022-11-28 DIAGNOSIS — G89.29 CHRONIC LOW BACK PAIN, UNSPECIFIED BACK PAIN LATERALITY, UNSPECIFIED WHETHER SCIATICA PRESENT: Primary | ICD-10-CM

## 2022-11-28 DIAGNOSIS — K76.82 HEPATIC ENCEPHALOPATHY: ICD-10-CM

## 2022-11-28 NOTE — OUTREACH NOTE
[facility] End of Month Documentation    This Chronic Medical Management Care Plan for Nic Nicolas, 57 y.o. male, has been established; a new plan of care implemented; monitored and managed and a new plan of care implemented for the month of November.  A cumulative time of 89  minutes was spent on this patient record this month, including phone call with patient; face to face with provider; electronic communication with primary care provider; chart review.    Regarding the patient's problems: has Arthritis, senescent; Colon polyps; Liver cirrhosis secondary to ROMO (nonalcoholic steatohepatitis) (HCC); Multiple episodes of deep venous thrombosis (HCC); High blood pressure; Hyperlipidemia; Other specified anemias; Sleep apnea; Systolic congestive heart failure (HCC); Bilateral lower extremity edema; Chronic cystitis; Chronic low back pain; Type 2 diabetes mellitus with hyperglycemia (HCC); Acid reflux; Acetabular fracture (HCC); Gross hematuria; Hesitancy of micturition; Gastrointestinal hemorrhage associated with peptic ulcer; Anxiety; Hepatic encephalopathy; Stroke (HCC); Amphetamine abuse (HCC); and Class 2 obesity due to excess calories without serious comorbidity with body mass index (BMI) of 36.0 to 36.9 in adult on their problem list., the following items were addressed: medical records; referrals to community service providers and any changes can be found within the plan section of the note.  A detailed listing of time spent for chronic care management is tracked within each outreach encounter.  Current medications include:  has a current medication list which includes the following prescription(s): albuterol sulfate hfa, albuterol sulfate hfa, atorvastatin, bd pen needle mabel u/f, buspirone, cetirizine, fluticasone, fluticasone, furosemide, generlac, humalog kwikpen, insulin detemir, omeprazole, ondansetron odt, polyethyl glycol-propyl glycol, potassium chloride, rifaximin, ropinirole, sertraline,  spironolactone, and [DISCONTINUED] fluticasone. and the patient is reported to be patient is compliant with medication protocol,  Medications are reported to be non-effective in controlling symptoms and changes have been made to the medication protocol.  The patient was monitored remotely for activity level; blood glucose; pain.    The patient's physical needs include:  medication education; physical healthcare.     The patient's mental support needs include:  increased support; coordination of community providers    The patient's cognitive support needs include:  medication; coordination of community providers; increased support    The patient's psychosocial support needs include:  need for increased support; coordination of community providers; medication management or adherence    The patient's functional needs include: eye care    The patient's environmental needs include:  not applicable, none    Care Plan overall comments:  initiated new care plan    Refer to previous outreach notes for more information on the areas listed above.    Monthly Billing Diagnoses  (M54.50,  G89.29) Chronic low back pain, unspecified back pain laterality, unspecified whether sciatica present    (K74.60,  R18.8) Cirrhosis of liver with ascites, unspecified hepatic cirrhosis type (HCC)    (R73.9) Elevated blood sugar    (K76.82) Hepatic encephalopathy    (I63.9) Cerebrovascular accident (CVA), unspecified mechanism (HCC)    Medications   · Medications have been reconciled    Care Plan progress this month:      Recently Modified Care Plans Updates made since 10/28/2022 12:00 AM     Fall Risk (Adult)         Problem Priority Last Modified     ELS FALL RISK (ADULT) --  11/10/2022  3:00 PM by Yazmin Rodriguez, RN              Goal Recent Progress Last Modified     Absence of Fall and Fall-Related Injury --  11/10/2022  3:00 PM by Yazmin Rodriguez, RN     Evidence-based guidance:   Assess fall risk using a validated tool when available.  Consider balance and gait impairment, muscle weakness, diminished vision or hearing, environmental hazards, presence of urinary or bowel urgency and/or incontinence.   Communicate fall injury risk to interprofessional healthcare team.   Develop a fall prevention plan with the patient and family.   Promote use of personal vision and auditory aids.   Promote reorientation, appropriate sensory stimulation, and routines to decrease risk of fall when changes in mental status are present.   Assess assistance level required for safe and effective self-care; consider referral for home care.   Encourage physical activity, such as performance of self-care at highest level of ability, strength and balance exercise program, and provision of appropriate assistive devices; refer to rehabilitation therapy.   Refer to community-based fall prevention program where available.   If fall occurs, determine the cause and revise fall injury prevention plan.   Regularly review medication contribution to fall risk; consider risk related to polypharmacy and age.   Refer to pharmacist for consultation when concerns about medications are revealed.   Balance adequate pain management with potential for oversedation.   Provide guidance related to environmental modifications.   Consider supplementation with Vitamin D.    Notes:            Task Due Date Last Modified     Identify and Manage Contributors to Fall Risk --  11/10/2022  3:00 PM by Yazmin Rodriguez RN     Care Management Activities:      - not discussed during this outreach      Notes:               Frailty Syndrome (Adult)         Problem Priority Last Modified     ELS DISEASE PROGRESSION (FRAILTY) (ADULT) --  11/10/2022  3:02 PM by Yazmin Rodriguez RN              Goal Recent Progress Last Modified     Disease Progression Prevented or Minimized --  11/10/2022  3:02 PM by Yazmin Rodriguez RN     Evidence-based guidance:   Anticipate referral to pharmacist for recognition of exposure to  "potentially inappropriate medication, such as aspirin, statin, antihypertensive, diuretic, bisphosphonate, analgesic, laxative and antidepressant, hypnotic.   Assess for presence of frailty indicators.   Develop individualized, interprofessional combination therapy plan that may slow progression of frailty.   Facilitate opportunity for shared decision-making regarding individualized medication de-prescribing plan; include plan for weaning when necessary.   Prevent infection by use of skin and hand hygiene, as well as oral and dental care; avoid aspiration and change position frequently; receive flu and pneumonia vaccines.   Review medication list for those that may contribute to the development of pneumonia, such as antipsychotic or sedative and those that cause dry mouth or achlorhydria.   Promote a regular daily exercise program based on tolerance, ability and patient choice that will support positive thinking about disease or aging process and reduce fear of falling.   Provide frequent encouragement and praise that supports positive view of aging; avoid use of term \"frailty\" that contributes to negative self-regard.    Notes:            Task Due Date Last Modified     Alleviate Barriers to Frailty Treatment --  11/10/2022  3:02 PM by Yazmin Rodriguez RN     Care Management Activities:      - not discussed during this outreach      Notes:            Problem Priority Last Modified     ELS MALNUTRITION (FRAILTY) (ADULT) --  11/10/2022  3:02 PM by Yazmin Rodriguez RN              Goal Recent Progress Last Modified     Malnutrition Prevented --  11/10/2022  3:02 PM by Yazmin Rodriguez, RN     Evidence-based guidance:   Anticipate supplementation with oral nutrition supplements if unable to meet energy and protein requirements through usual foods and beverages.   Anticipate supplementation with vitamin D2 or D3 and calcium when insufficiency is suspected or confirmed.   Complete a comprehensive nutrition assessment that " includes the identification of macro and micro deficiencies, barriers to eating, cognition and mental status, as well as social determinants of health.   Mutually determine weight goal focusing on preservation or increase in lean body mass.   Provide guidance to caregiver regarding the need to offer feeding assistance in a way that is not demeaning and promotes as much independence as possible.   Provide ongoing encouragement and support as diet changes are made and supplements are added, including home visits and telephone calls.    Notes:            Task Due Date Last Modified     Optimize Nutrition Status --  11/10/2022  3:02 PM by Yazmin Rodriguez RN     Care Management Activities:      - not discussed during this outreach      Notes:            Problem Priority Last Modified     ELS FUNCTIONAL DECLINE (FRAILTY) (ADULT) --  11/10/2022  3:02 PM by Yazmin Rodriguez RN              Goal Recent Progress Last Modified     Functional Decline Minimized --  11/10/2022  3:02 PM by Yazmin Rodriguez RN     Evidence-based guidance:   Assess fall risk, including balance and gait impairment, muscle weakness, diminished vision or hearing, environmental hazards, effects of medication and dehydration.   Assess and review gait speed, decreased muscle strength or impaired functional mobility; consider use of validated tool if available.   Prepare patient for rehabilitation services to develop an individualized activity and exercise program as indicated, such as progressive resistance strengthening, balance and activity tolerance training or home exercise program.   Prevent falls with environmental adjustment; encourage compliance with exercise program, management of incontinence and adequate vitamin D and calcium intake from food or supplements.   Promote gradual increase in intensity of activity and exercise as tolerated, such as duration, frequency, exercise repetition and sets and intensity.   Provide guidance and interventions  aimed at fall prevention.   Review or assess presence of reduced muscle mass or results of dual-energy x-ray absorptiometry.    Notes:            Task Due Date Last Modified     Maintain or Improve Strength and Functional Ability --  11/10/2022  3:02 PM by Yazmin Rodriguez RN     Care Management Activities:      - not discussed during this outreach      Notes:                    · Current Specialty Plan of Care Status signed by both patient and provider    Instructions   · Patient was provided an electronic copy of care plan  · CCM services were explained and offered and patient has accepted these services.  · Patient has given their written consent to receive CCM services and understands that this includes the authorization of electronic communication of medical information with the other treating providers.  · Patient understands that they may stop CCM services at any time and these changes will be effective at the end of the calendar month and will effectively revocate the agreement of CCM services.  · Patient understands that only one practitioner can furnish and be paid for CCM services during one calendar month.  Patient also understands that there may be co-payment or deductible fees in association with CCM services.  · Patient will continue with at least monthly follow-up calls with the Ambulatory .    YAZMIN NICOLE  Ambulatory Case Management    11/28/2022, 16:04 EST

## 2022-11-29 ENCOUNTER — READMISSION MANAGEMENT (OUTPATIENT)
Dept: CALL CENTER | Facility: HOSPITAL | Age: 57
End: 2022-11-29

## 2022-11-29 NOTE — OUTREACH NOTE
Medical Week 3 Survey    Flowsheet Row Responses   [facility] discharged from? [facility]   Does the patient have one of the following disease processes/diagnoses(primary or secondary)? Other   Week 3 attempt successful? No  [spoke with sister(listed as home number) she asks that we call patient.  No answer at mobile number]   Call start time [id number]   Unsuccessful attempts Attempt 1   Person spoke with today (if not patient) and relationship Medina, sister   Meds reviewed with patient/caregiver? Yes   Does the patient have all medications ordered at discharge? Yes   Is the patient taking all medications as directed (includes completed medication regime)? Yes          TERESA NEAL - Registered Nurse

## 2022-11-30 ENCOUNTER — APPOINTMENT (OUTPATIENT)
Dept: DIABETES SERVICES | Facility: HOSPITAL | Age: 57
End: 2022-11-30
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
## 2022-12-06 ENCOUNTER — OFFICE VISIT (OUTPATIENT)
Dept: GASTROENTEROLOGY | Facility: CLINIC | Age: 57
End: 2022-12-06

## 2022-12-06 ENCOUNTER — LAB (OUTPATIENT)
Dept: LAB | Facility: HOSPITAL | Age: 57
End: 2022-12-06

## 2022-12-06 ENCOUNTER — PREP FOR SURGERY (OUTPATIENT)
Dept: OTHER | Facility: HOSPITAL | Age: 57
End: 2022-12-06

## 2022-12-06 VITALS
HEIGHT: 68 IN | WEIGHT: 249 LBS | OXYGEN SATURATION: 98 % | BODY MASS INDEX: 37.74 KG/M2 | DIASTOLIC BLOOD PRESSURE: 75 MMHG | HEART RATE: 79 BPM | SYSTOLIC BLOOD PRESSURE: 145 MMHG

## 2022-12-06 DIAGNOSIS — K74.60 CIRRHOSIS OF LIVER WITH ASCITES, UNSPECIFIED HEPATIC CIRRHOSIS TYPE: ICD-10-CM

## 2022-12-06 DIAGNOSIS — R18.8 CIRRHOSIS OF LIVER WITH ASCITES, UNSPECIFIED HEPATIC CIRRHOSIS TYPE: Primary | ICD-10-CM

## 2022-12-06 DIAGNOSIS — R18.8 OTHER ASCITES: Primary | ICD-10-CM

## 2022-12-06 DIAGNOSIS — D64.9 ANEMIA, UNSPECIFIED TYPE: ICD-10-CM

## 2022-12-06 DIAGNOSIS — R18.8 CIRRHOSIS OF LIVER WITH ASCITES, UNSPECIFIED HEPATIC CIRRHOSIS TYPE: ICD-10-CM

## 2022-12-06 DIAGNOSIS — K76.0 NONALCOHOLIC FATTY LIVER DISEASE: ICD-10-CM

## 2022-12-06 DIAGNOSIS — K74.60 CIRRHOSIS OF LIVER WITH ASCITES, UNSPECIFIED HEPATIC CIRRHOSIS TYPE: Primary | ICD-10-CM

## 2022-12-06 LAB
ALBUMIN SERPL-MCNC: 3.7 G/DL (ref 3.5–5.2)
ALBUMIN/GLOB SERPL: 1.3 G/DL
ALP SERPL-CCNC: 95 U/L (ref 39–117)
ALPHA-FETOPROTEIN: 3.12 NG/ML (ref 0–8.3)
ALT SERPL W P-5'-P-CCNC: 28 U/L (ref 1–41)
AMMONIA BLD-SCNC: 40 UMOL/L (ref 16–60)
ANION GAP SERPL CALCULATED.3IONS-SCNC: 12.4 MMOL/L (ref 5–15)
AST SERPL-CCNC: 38 U/L (ref 1–40)
BASOPHILS # BLD AUTO: 0.04 10*3/MM3 (ref 0–0.2)
BASOPHILS NFR BLD AUTO: 0.9 % (ref 0–1.5)
BILIRUB SERPL-MCNC: 1.9 MG/DL (ref 0–1.2)
BUN SERPL-MCNC: 15 MG/DL (ref 6–20)
BUN/CREAT SERPL: 13.5 (ref 7–25)
CALCIUM SPEC-SCNC: 9 MG/DL (ref 8.6–10.5)
CHLORIDE SERPL-SCNC: 103 MMOL/L (ref 98–107)
CO2 SERPL-SCNC: 22.6 MMOL/L (ref 22–29)
CREAT SERPL-MCNC: 1.11 MG/DL (ref 0.76–1.27)
DEPRECATED RDW RBC AUTO: 46.7 FL (ref 37–54)
EGFRCR SERPLBLD CKD-EPI 2021: 77.9 ML/MIN/1.73
EOSINOPHIL # BLD AUTO: 0.1 10*3/MM3 (ref 0–0.4)
EOSINOPHIL NFR BLD AUTO: 2.4 % (ref 0.3–6.2)
ERYTHROCYTE [DISTWIDTH] IN BLOOD BY AUTOMATED COUNT: 13.8 % (ref 12.3–15.4)
FERRITIN SERPL-MCNC: 24.87 NG/ML (ref 30–400)
GLOBULIN UR ELPH-MCNC: 2.8 GM/DL
GLUCOSE SERPL-MCNC: 279 MG/DL (ref 65–99)
HCT VFR BLD AUTO: 29 % (ref 37.5–51)
HGB BLD-MCNC: 9.4 G/DL (ref 13–17.7)
IMM GRANULOCYTES # BLD AUTO: 0.01 10*3/MM3 (ref 0–0.05)
IMM GRANULOCYTES NFR BLD AUTO: 0.2 % (ref 0–0.5)
INR PPP: 1.86 (ref 0.86–1.15)
IRON 24H UR-MRATE: 78 MCG/DL (ref 59–158)
IRON SATN MFR SERPL: 15 % (ref 20–50)
LYMPHOCYTES # BLD AUTO: 0.8 10*3/MM3 (ref 0.7–3.1)
LYMPHOCYTES NFR BLD AUTO: 19 % (ref 19.6–45.3)
MCH RBC QN AUTO: 29.6 PG (ref 26.6–33)
MCHC RBC AUTO-ENTMCNC: 32.4 G/DL (ref 31.5–35.7)
MCV RBC AUTO: 91.2 FL (ref 79–97)
MONOCYTES # BLD AUTO: 0.45 10*3/MM3 (ref 0.1–0.9)
MONOCYTES NFR BLD AUTO: 10.7 % (ref 5–12)
NEUTROPHILS NFR BLD AUTO: 2.82 10*3/MM3 (ref 1.7–7)
NEUTROPHILS NFR BLD AUTO: 66.8 % (ref 42.7–76)
NRBC BLD AUTO-RTO: 0 /100 WBC (ref 0–0.2)
PLATELET # BLD AUTO: 67 10*3/MM3 (ref 140–450)
PMV BLD AUTO: 9.9 FL (ref 6–12)
POTASSIUM SERPL-SCNC: 3.9 MMOL/L (ref 3.5–5.2)
PROT SERPL-MCNC: 6.5 G/DL (ref 6–8.5)
PROTHROMBIN TIME: 21.8 SECONDS (ref 11.8–14.9)
RBC # BLD AUTO: 3.18 10*6/MM3 (ref 4.14–5.8)
SODIUM SERPL-SCNC: 138 MMOL/L (ref 136–145)
TIBC SERPL-MCNC: 517 MCG/DL (ref 298–536)
TRANSFERRIN SERPL-MCNC: 347 MG/DL (ref 200–360)
WBC NRBC COR # BLD: 4.22 10*3/MM3 (ref 3.4–10.8)

## 2022-12-06 PROCEDURE — 82105 ALPHA-FETOPROTEIN SERUM: CPT

## 2022-12-06 PROCEDURE — 85610 PROTHROMBIN TIME: CPT

## 2022-12-06 PROCEDURE — 80053 COMPREHEN METABOLIC PANEL: CPT

## 2022-12-06 PROCEDURE — 36415 COLL VENOUS BLD VENIPUNCTURE: CPT

## 2022-12-06 PROCEDURE — 85025 COMPLETE CBC W/AUTO DIFF WBC: CPT

## 2022-12-06 PROCEDURE — 99215 OFFICE O/P EST HI 40 MIN: CPT | Performed by: NURSE PRACTITIONER

## 2022-12-06 PROCEDURE — 99417 PROLNG OP E/M EACH 15 MIN: CPT | Performed by: NURSE PRACTITIONER

## 2022-12-06 PROCEDURE — 82140 ASSAY OF AMMONIA: CPT

## 2022-12-06 PROCEDURE — 84466 ASSAY OF TRANSFERRIN: CPT

## 2022-12-06 PROCEDURE — 82728 ASSAY OF FERRITIN: CPT

## 2022-12-06 PROCEDURE — 83540 ASSAY OF IRON: CPT

## 2022-12-06 RX ORDER — SPIRONOLACTONE 100 MG/1
100 TABLET, FILM COATED ORAL 2 TIMES DAILY
Qty: 60 TABLET | Refills: 3 | Status: SHIPPED | OUTPATIENT
Start: 2022-12-06 | End: 2023-01-20 | Stop reason: SDUPTHER

## 2022-12-06 RX ORDER — ALBUMIN (HUMAN) 12.5 G/50ML
50 SOLUTION INTRAVENOUS ONCE
Status: CANCELLED | OUTPATIENT
Start: 2022-12-06 | End: 2022-12-06

## 2022-12-07 ENCOUNTER — APPOINTMENT (OUTPATIENT)
Dept: DIABETES SERVICES | Facility: HOSPITAL | Age: 57
End: 2022-12-07

## 2022-12-08 RX ORDER — ROPINIROLE 1 MG/1
TABLET, FILM COATED ORAL
Qty: 30 TABLET | Refills: 0 | OUTPATIENT
Start: 2022-12-08

## 2022-12-08 RX ORDER — PEN NEEDLE, DIABETIC 32GX 5/32"
NEEDLE, DISPOSABLE MISCELLANEOUS
Qty: 100 EACH | Refills: 0 | Status: ON HOLD | OUTPATIENT
Start: 2022-12-08 | End: 2022-12-21

## 2022-12-08 RX ORDER — BUSPIRONE HYDROCHLORIDE 7.5 MG/1
TABLET ORAL
Qty: 90 TABLET | Refills: 5 | Status: SHIPPED | OUTPATIENT
Start: 2022-12-08 | End: 2023-01-20 | Stop reason: SDUPTHER

## 2022-12-09 ENCOUNTER — TELEPHONE (OUTPATIENT)
Dept: GASTROENTEROLOGY | Facility: CLINIC | Age: 57
End: 2022-12-09

## 2022-12-09 NOTE — TELEPHONE ENCOUNTER
----- Message from BESSIE Díaz sent at 12/8/2022  8:00 AM EST -----  Normal alpha-fetoprotein.  Glucose 279 when labs were drawn.  Mildly elevated bilirubin.

## 2022-12-13 ENCOUNTER — PATIENT OUTREACH (OUTPATIENT)
Dept: CASE MANAGEMENT | Facility: OTHER | Age: 57
End: 2022-12-13

## 2022-12-13 DIAGNOSIS — M54.50 CHRONIC LOW BACK PAIN, UNSPECIFIED BACK PAIN LATERALITY, UNSPECIFIED WHETHER SCIATICA PRESENT: Primary | ICD-10-CM

## 2022-12-13 DIAGNOSIS — G89.29 CHRONIC LOW BACK PAIN, UNSPECIFIED BACK PAIN LATERALITY, UNSPECIFIED WHETHER SCIATICA PRESENT: Primary | ICD-10-CM

## 2022-12-13 DIAGNOSIS — R18.8 CIRRHOSIS OF LIVER WITH ASCITES, UNSPECIFIED HEPATIC CIRRHOSIS TYPE: ICD-10-CM

## 2022-12-13 DIAGNOSIS — K74.60 CIRRHOSIS OF LIVER WITH ASCITES, UNSPECIFIED HEPATIC CIRRHOSIS TYPE: ICD-10-CM

## 2022-12-13 NOTE — OUTREACH NOTE
AMBULATORY CASE MANAGEMENT NOTE    Name and Relationship of Patient/Support Person: Fidencio, Nicloli Paz - Self    CCM Interim Update    Per chart review he has seen GI this month but cancelled his follow up with Endocrinology and no showed his rescheduled follow up visit with BESSIE Pedraza yesterday. He is scheduled for paracentesis ultrasound guided at [facility] tomorrow. I called to remind and check on how he is doing and left message for call back.             CHAPIS NICOLE  Ambulatory Case Management    12/13/2022, 15:31 EST

## 2022-12-18 ENCOUNTER — HOSPITAL ENCOUNTER (EMERGENCY)
Facility: HOSPITAL | Age: 57
Discharge: HOME | DRG: 442 | End: 2022-12-18
Attending: EMERGENCY MEDICINE | Admitting: EMERGENCY MEDICINE
Payer: COMMERCIAL

## 2022-12-18 VITALS
OXYGEN SATURATION: 95 % | BODY MASS INDEX: 35.99 KG/M2 | WEIGHT: 237.44 LBS | SYSTOLIC BLOOD PRESSURE: 101 MMHG | RESPIRATION RATE: 14 BRPM | HEIGHT: 68 IN | TEMPERATURE: 97.7 F | HEART RATE: 60 BPM | DIASTOLIC BLOOD PRESSURE: 63 MMHG

## 2022-12-18 DIAGNOSIS — M54.9 CHRONIC BACK PAIN, UNSPECIFIED BACK LOCATION, UNSPECIFIED BACK PAIN LATERALITY: ICD-10-CM

## 2022-12-18 DIAGNOSIS — G89.29 CHRONIC BACK PAIN, UNSPECIFIED BACK LOCATION, UNSPECIFIED BACK PAIN LATERALITY: ICD-10-CM

## 2022-12-18 DIAGNOSIS — B34.9 ACUTE VIRAL SYNDROME: Primary | ICD-10-CM

## 2022-12-18 LAB
ALBUMIN SERPL-MCNC: 3.8 G/DL (ref 3.5–5.2)
ALBUMIN/GLOB SERPL: 1.4 G/DL
ALP SERPL-CCNC: 85 U/L (ref 39–117)
ALT SERPL W P-5'-P-CCNC: 25 U/L (ref 1–41)
ANION GAP SERPL CALCULATED.3IONS-SCNC: 10.2 MMOL/L (ref 5–15)
AST SERPL-CCNC: 49 U/L (ref 1–40)
BASOPHILS # BLD AUTO: 0.06 10*3/MM3 (ref 0–0.2)
BASOPHILS NFR BLD AUTO: 1 % (ref 0–1.5)
BILIRUB SERPL-MCNC: 1.9 MG/DL (ref 0–1.2)
BILIRUB UR QL STRIP: NEGATIVE
BUN SERPL-MCNC: 22 MG/DL (ref 6–20)
BUN/CREAT SERPL: 16.4 (ref 7–25)
CALCIUM SPEC-SCNC: 9.4 MG/DL (ref 8.6–10.5)
CHLORIDE SERPL-SCNC: 104 MMOL/L (ref 98–107)
CLARITY UR: CLEAR
CO2 SERPL-SCNC: 24.8 MMOL/L (ref 22–29)
COLOR UR: YELLOW
CREAT SERPL-MCNC: 1.34 MG/DL (ref 0.76–1.27)
D-LACTATE SERPL-SCNC: 2 MMOL/L (ref 0.5–2)
DEPRECATED RDW RBC AUTO: 45.3 FL (ref 37–54)
EGFRCR SERPLBLD CKD-EPI 2021: 62.2 ML/MIN/1.73
EOSINOPHIL # BLD AUTO: 0.3 10*3/MM3 (ref 0–0.4)
EOSINOPHIL NFR BLD AUTO: 4.8 % (ref 0.3–6.2)
ERYTHROCYTE [DISTWIDTH] IN BLOOD BY AUTOMATED COUNT: 14.6 % (ref 12.3–15.4)
FLUAV AG NPH QL: NEGATIVE
FLUBV AG NPH QL IA: NEGATIVE
GLOBULIN UR ELPH-MCNC: 2.8 GM/DL
GLUCOSE SERPL-MCNC: 173 MG/DL (ref 65–99)
GLUCOSE UR STRIP-MCNC: NEGATIVE MG/DL
HCT VFR BLD AUTO: 33.2 % (ref 37.5–51)
HGB BLD-MCNC: 10.9 G/DL (ref 13–17.7)
HGB UR QL STRIP.AUTO: NEGATIVE
HOLD SPECIMEN: NORMAL
HOLD SPECIMEN: NORMAL
IMM GRANULOCYTES # BLD AUTO: 0.01 10*3/MM3 (ref 0–0.05)
IMM GRANULOCYTES NFR BLD AUTO: 0.2 % (ref 0–0.5)
KETONES UR QL STRIP: NEGATIVE
LEUKOCYTE ESTERASE UR QL STRIP.AUTO: NEGATIVE
LIPASE SERPL-CCNC: 40 U/L (ref 13–60)
LYMPHOCYTES # BLD AUTO: 1.53 10*3/MM3 (ref 0.7–3.1)
LYMPHOCYTES NFR BLD AUTO: 24.7 % (ref 19.6–45.3)
MCH RBC QN AUTO: 28.6 PG (ref 26.6–33)
MCHC RBC AUTO-ENTMCNC: 32.8 G/DL (ref 31.5–35.7)
MCV RBC AUTO: 87.1 FL (ref 79–97)
MONOCYTES # BLD AUTO: 0.55 10*3/MM3 (ref 0.1–0.9)
MONOCYTES NFR BLD AUTO: 8.9 % (ref 5–12)
NEUTROPHILS NFR BLD AUTO: 3.75 10*3/MM3 (ref 1.7–7)
NEUTROPHILS NFR BLD AUTO: 60.4 % (ref 42.7–76)
NITRITE UR QL STRIP: NEGATIVE
NRBC BLD AUTO-RTO: 0 /100 WBC (ref 0–0.2)
PH UR STRIP.AUTO: 5.5 [PH] (ref 5–8)
PLATELET # BLD AUTO: 120 10*3/MM3 (ref 140–450)
PMV BLD AUTO: 10.9 FL (ref 6–12)
POTASSIUM SERPL-SCNC: 4.4 MMOL/L (ref 3.5–5.2)
PROT SERPL-MCNC: 6.6 G/DL (ref 6–8.5)
PROT UR QL STRIP: NEGATIVE
RBC # BLD AUTO: 3.81 10*6/MM3 (ref 4.14–5.8)
SARS-COV-2 RNA PNL SPEC NAA+PROBE: NOT DETECTED
SODIUM SERPL-SCNC: 139 MMOL/L (ref 136–145)
SP GR UR STRIP: 1.01 (ref 1–1.03)
UROBILINOGEN UR QL STRIP: NORMAL
WBC NRBC COR # BLD: 6.2 10*3/MM3 (ref 3.4–10.8)
WHOLE BLOOD HOLD COAG: NORMAL
WHOLE BLOOD HOLD SPECIMEN: NORMAL

## 2022-12-18 PROCEDURE — 83690 ASSAY OF LIPASE: CPT | Performed by: EMERGENCY MEDICINE

## 2022-12-18 PROCEDURE — 87804 INFLUENZA ASSAY W/OPTIC: CPT | Performed by: EMERGENCY MEDICINE

## 2022-12-18 PROCEDURE — 99283 EMERGENCY DEPT VISIT LOW MDM: CPT

## 2022-12-18 PROCEDURE — 81003 URINALYSIS AUTO W/O SCOPE: CPT | Performed by: EMERGENCY MEDICINE

## 2022-12-18 PROCEDURE — 85025 COMPLETE CBC W/AUTO DIFF WBC: CPT | Performed by: EMERGENCY MEDICINE

## 2022-12-18 PROCEDURE — 36415 COLL VENOUS BLD VENIPUNCTURE: CPT

## 2022-12-18 PROCEDURE — 83605 ASSAY OF LACTIC ACID: CPT | Performed by: EMERGENCY MEDICINE

## 2022-12-18 PROCEDURE — 25010000002 HYDROMORPHONE 1 MG/ML SOLUTION: Performed by: EMERGENCY MEDICINE

## 2022-12-18 PROCEDURE — 96374 THER/PROPH/DIAG INJ IV PUSH: CPT

## 2022-12-18 PROCEDURE — 80053 COMPREHEN METABOLIC PANEL: CPT | Performed by: EMERGENCY MEDICINE

## 2022-12-18 PROCEDURE — U0004 COV-19 TEST NON-CDC HGH THRU: HCPCS | Performed by: EMERGENCY MEDICINE

## 2022-12-18 PROCEDURE — C9803 HOPD COVID-19 SPEC COLLECT: HCPCS | Performed by: EMERGENCY MEDICINE

## 2022-12-18 PROCEDURE — 96375 TX/PRO/DX INJ NEW DRUG ADDON: CPT

## 2022-12-18 PROCEDURE — 25010000002 ONDANSETRON PER 1 MG: Performed by: EMERGENCY MEDICINE

## 2022-12-18 RX ORDER — SODIUM CHLORIDE 0.9 % (FLUSH) 0.9 %
10 SYRINGE (ML) INJECTION AS NEEDED
Status: DISCONTINUED | OUTPATIENT
Start: 2022-12-18 | End: 2022-12-18 | Stop reason: HOSPADM

## 2022-12-18 RX ORDER — ONDANSETRON 4 MG/1
4 TABLET, ORALLY DISINTEGRATING ORAL 4 TIMES DAILY PRN
Qty: 8 TABLET | Refills: 0 | Status: SHIPPED | OUTPATIENT
Start: 2022-12-18

## 2022-12-18 RX ORDER — ONDANSETRON 2 MG/ML
4 INJECTION INTRAMUSCULAR; INTRAVENOUS ONCE
Status: COMPLETED | OUTPATIENT
Start: 2022-12-18 | End: 2022-12-18

## 2022-12-18 RX ADMIN — SODIUM CHLORIDE, POTASSIUM CHLORIDE, SODIUM LACTATE AND CALCIUM CHLORIDE 500 ML: 600; 310; 30; 20 INJECTION, SOLUTION INTRAVENOUS at 03:31

## 2022-12-18 RX ADMIN — ONDANSETRON 4 MG: 2 INJECTION INTRAMUSCULAR; INTRAVENOUS at 03:30

## 2022-12-18 RX ADMIN — HYDROMORPHONE HYDROCHLORIDE 0.5 MG: 1 INJECTION, SOLUTION INTRAMUSCULAR; INTRAVENOUS; SUBCUTANEOUS at 03:30
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
## 2022-12-20 ENCOUNTER — APPOINTMENT (OUTPATIENT)
Dept: GENERAL RADIOLOGY | Facility: HOSPITAL | Age: 57
DRG: 442 | End: 2022-12-20
Payer: COMMERCIAL

## 2022-12-20 ENCOUNTER — HOSPITAL ENCOUNTER (INPATIENT)
Facility: HOSPITAL | Age: 57
LOS: 1 days | Discharge: HOME | DRG: 442 | End: 2022-12-22
Attending: EMERGENCY MEDICINE | Admitting: STUDENT IN AN ORGANIZED HEALTH CARE EDUCATION/TRAINING PROGRAM
Payer: COMMERCIAL

## 2022-12-20 DIAGNOSIS — R18.8 CIRRHOSIS OF LIVER WITH ASCITES, UNSPECIFIED HEPATIC CIRRHOSIS TYPE: ICD-10-CM

## 2022-12-20 DIAGNOSIS — R41.82 ALTERED MENTAL STATUS, UNSPECIFIED ALTERED MENTAL STATUS TYPE: ICD-10-CM

## 2022-12-20 DIAGNOSIS — E72.20 HYPERAMMONEMIA: Primary | ICD-10-CM

## 2022-12-20 DIAGNOSIS — K74.60 CIRRHOSIS OF LIVER WITH ASCITES, UNSPECIFIED HEPATIC CIRRHOSIS TYPE: ICD-10-CM

## 2022-12-20 DIAGNOSIS — Z78.9 DECREASED ACTIVITIES OF DAILY LIVING (ADL): ICD-10-CM

## 2022-12-20 LAB
ALBUMIN SERPL-MCNC: 3.5 G/DL (ref 3.5–5.2)
ALBUMIN/GLOB SERPL: 1.3 G/DL
ALP SERPL-CCNC: 75 U/L (ref 39–117)
ALT SERPL W P-5'-P-CCNC: 20 U/L (ref 1–41)
AMMONIA BLD-SCNC: 110 UMOL/L (ref 16–60)
ANION GAP SERPL CALCULATED.3IONS-SCNC: 13.1 MMOL/L (ref 5–15)
AST SERPL-CCNC: 39 U/L (ref 1–40)
BASOPHILS # BLD AUTO: 0.06 10*3/MM3 (ref 0–0.2)
BASOPHILS NFR BLD AUTO: 1 % (ref 0–1.5)
BILIRUB SERPL-MCNC: 1.8 MG/DL (ref 0–1.2)
BUN SERPL-MCNC: 29 MG/DL (ref 6–20)
BUN/CREAT SERPL: 23.2 (ref 7–25)
CALCIUM SPEC-SCNC: 9.7 MG/DL (ref 8.6–10.5)
CHLORIDE SERPL-SCNC: 105 MMOL/L (ref 98–107)
CO2 SERPL-SCNC: 22.9 MMOL/L (ref 22–29)
CREAT SERPL-MCNC: 1.25 MG/DL (ref 0.76–1.27)
DEPRECATED RDW RBC AUTO: 45.6 FL (ref 37–54)
EGFRCR SERPLBLD CKD-EPI 2021: 67.6 ML/MIN/1.73
EOSINOPHIL # BLD AUTO: 0.24 10*3/MM3 (ref 0–0.4)
EOSINOPHIL NFR BLD AUTO: 4.2 % (ref 0.3–6.2)
ERYTHROCYTE [DISTWIDTH] IN BLOOD BY AUTOMATED COUNT: 14.9 % (ref 12.3–15.4)
GLOBULIN UR ELPH-MCNC: 2.7 GM/DL
GLUCOSE BLDC GLUCOMTR-MCNC: 101 MG/DL (ref 70–99)
GLUCOSE BLDC GLUCOMTR-MCNC: 96 MG/DL (ref 70–99)
GLUCOSE SERPL-MCNC: 110 MG/DL (ref 65–99)
HCT VFR BLD AUTO: 29.1 % (ref 37.5–51)
HGB BLD-MCNC: 9.9 G/DL (ref 13–17.7)
HOLD SPECIMEN: NORMAL
HOLD SPECIMEN: NORMAL
IMM GRANULOCYTES # BLD AUTO: 0.01 10*3/MM3 (ref 0–0.05)
IMM GRANULOCYTES NFR BLD AUTO: 0.2 % (ref 0–0.5)
LYMPHOCYTES # BLD AUTO: 1.59 10*3/MM3 (ref 0.7–3.1)
LYMPHOCYTES NFR BLD AUTO: 27.8 % (ref 19.6–45.3)
MAGNESIUM SERPL-MCNC: 1.7 MG/DL (ref 1.6–2.6)
MCH RBC QN AUTO: 28.9 PG (ref 26.6–33)
MCHC RBC AUTO-ENTMCNC: 34 G/DL (ref 31.5–35.7)
MCV RBC AUTO: 85.1 FL (ref 79–97)
MONOCYTES # BLD AUTO: 0.54 10*3/MM3 (ref 0.1–0.9)
MONOCYTES NFR BLD AUTO: 9.4 % (ref 5–12)
NEUTROPHILS NFR BLD AUTO: 3.28 10*3/MM3 (ref 1.7–7)
NEUTROPHILS NFR BLD AUTO: 57.4 % (ref 42.7–76)
NRBC BLD AUTO-RTO: 0 /100 WBC (ref 0–0.2)
PLATELET # BLD AUTO: 86 10*3/MM3 (ref 140–450)
PMV BLD AUTO: 10.7 FL (ref 6–12)
POTASSIUM SERPL-SCNC: 4.3 MMOL/L (ref 3.5–5.2)
PROT SERPL-MCNC: 6.2 G/DL (ref 6–8.5)
RBC # BLD AUTO: 3.42 10*6/MM3 (ref 4.14–5.8)
SODIUM SERPL-SCNC: 141 MMOL/L (ref 136–145)
TROPONIN T SERPL-MCNC: <0.01 NG/ML (ref 0–0.03)
WBC NRBC COR # BLD: 5.72 10*3/MM3 (ref 3.4–10.8)
WHOLE BLOOD HOLD COAG: NORMAL
WHOLE BLOOD HOLD SPECIMEN: NORMAL

## 2022-12-20 PROCEDURE — 93005 ELECTROCARDIOGRAM TRACING: CPT

## 2022-12-20 PROCEDURE — 84484 ASSAY OF TROPONIN QUANT: CPT

## 2022-12-20 PROCEDURE — 71045 X-RAY EXAM CHEST 1 VIEW: CPT

## 2022-12-20 PROCEDURE — 85025 COMPLETE CBC W/AUTO DIFF WBC: CPT

## 2022-12-20 PROCEDURE — 99285 EMERGENCY DEPT VISIT HI MDM: CPT

## 2022-12-20 PROCEDURE — 93010 ELECTROCARDIOGRAM REPORT: CPT | Performed by: INTERNAL MEDICINE

## 2022-12-20 PROCEDURE — 82962 GLUCOSE BLOOD TEST: CPT

## 2022-12-20 PROCEDURE — 93005 ELECTROCARDIOGRAM TRACING: CPT | Performed by: EMERGENCY MEDICINE

## 2022-12-20 PROCEDURE — 72100 X-RAY EXAM L-S SPINE 2/3 VWS: CPT

## 2022-12-20 PROCEDURE — 25010000002 ONDANSETRON PER 1 MG: Performed by: EMERGENCY MEDICINE

## 2022-12-20 PROCEDURE — 80053 COMPREHEN METABOLIC PANEL: CPT

## 2022-12-20 PROCEDURE — 73562 X-RAY EXAM OF KNEE 3: CPT

## 2022-12-20 PROCEDURE — 25010000002 HYDROMORPHONE 1 MG/ML SOLUTION: Performed by: EMERGENCY MEDICINE

## 2022-12-20 PROCEDURE — 36415 COLL VENOUS BLD VENIPUNCTURE: CPT

## 2022-12-20 PROCEDURE — 73502 X-RAY EXAM HIP UNI 2-3 VIEWS: CPT

## 2022-12-20 PROCEDURE — 82140 ASSAY OF AMMONIA: CPT | Performed by: EMERGENCY MEDICINE

## 2022-12-20 PROCEDURE — 83735 ASSAY OF MAGNESIUM: CPT

## 2022-12-20 RX ORDER — ONDANSETRON 2 MG/ML
4 INJECTION INTRAMUSCULAR; INTRAVENOUS ONCE
Status: COMPLETED | OUTPATIENT
Start: 2022-12-20 | End: 2022-12-20

## 2022-12-20 RX ORDER — SODIUM CHLORIDE 0.9 % (FLUSH) 0.9 %
10 SYRINGE (ML) INJECTION AS NEEDED
Status: DISCONTINUED | OUTPATIENT
Start: 2022-12-20 | End: 2022-12-22 | Stop reason: HOSPADM

## 2022-12-20 RX ORDER — LACTULOSE 10 G/15ML
30 SOLUTION ORAL DAILY
Status: DISCONTINUED | OUTPATIENT
Start: 2022-12-21 | End: 2022-12-21

## 2022-12-20 RX ADMIN — ONDANSETRON 4 MG: 2 INJECTION INTRAMUSCULAR; INTRAVENOUS at 22:56

## 2022-12-20 RX ADMIN — HYDROMORPHONE HYDROCHLORIDE 1 MG: 1 INJECTION, SOLUTION INTRAMUSCULAR; INTRAVENOUS; SUBCUTANEOUS at 22:56

## 2022-12-21 ENCOUNTER — APPOINTMENT (OUTPATIENT)
Dept: ULTRASOUND IMAGING | Facility: HOSPITAL | Age: 57
DRG: 442 | End: 2022-12-21
Payer: COMMERCIAL

## 2022-12-21 ENCOUNTER — TELEPHONE (OUTPATIENT)
Dept: CASE MANAGEMENT | Facility: OTHER | Age: 57
End: 2022-12-21

## 2022-12-21 PROBLEM — E72.20 HYPERAMMONEMIA: Status: ACTIVE | Noted: 2022-12-21

## 2022-12-21 LAB
ANION GAP SERPL CALCULATED.3IONS-SCNC: 11.4 MMOL/L (ref 5–15)
BASOPHILS # BLD AUTO: 0.03 10*3/MM3 (ref 0–0.2)
BASOPHILS NFR BLD AUTO: 0.7 % (ref 0–1.5)
BUN SERPL-MCNC: 31 MG/DL (ref 6–20)
BUN/CREAT SERPL: 21.5 (ref 7–25)
CALCIUM SPEC-SCNC: 9.6 MG/DL (ref 8.6–10.5)
CHLORIDE SERPL-SCNC: 105 MMOL/L (ref 98–107)
CO2 SERPL-SCNC: 23.6 MMOL/L (ref 22–29)
CREAT SERPL-MCNC: 1.44 MG/DL (ref 0.76–1.27)
DEPRECATED RDW RBC AUTO: 47.1 FL (ref 37–54)
EGFRCR SERPLBLD CKD-EPI 2021: 57 ML/MIN/1.73
EOSINOPHIL # BLD AUTO: 0.16 10*3/MM3 (ref 0–0.4)
EOSINOPHIL NFR BLD AUTO: 3.9 % (ref 0.3–6.2)
ERYTHROCYTE [DISTWIDTH] IN BLOOD BY AUTOMATED COUNT: 15.3 % (ref 12.3–15.4)
GLUCOSE BLDC GLUCOMTR-MCNC: 125 MG/DL (ref 70–99)
GLUCOSE BLDC GLUCOMTR-MCNC: 174 MG/DL (ref 70–99)
GLUCOSE BLDC GLUCOMTR-MCNC: 178 MG/DL (ref 70–99)
GLUCOSE BLDC GLUCOMTR-MCNC: 191 MG/DL (ref 70–99)
GLUCOSE SERPL-MCNC: 143 MG/DL (ref 65–99)
HCT VFR BLD AUTO: 28.1 % (ref 37.5–51)
HGB BLD-MCNC: 9.4 G/DL (ref 13–17.7)
IMM GRANULOCYTES # BLD AUTO: 0.01 10*3/MM3 (ref 0–0.05)
IMM GRANULOCYTES NFR BLD AUTO: 0.2 % (ref 0–0.5)
LYMPHOCYTES # BLD AUTO: 1.4 10*3/MM3 (ref 0.7–3.1)
LYMPHOCYTES NFR BLD AUTO: 34 % (ref 19.6–45.3)
MAGNESIUM SERPL-MCNC: 1.8 MG/DL (ref 1.6–2.6)
MCH RBC QN AUTO: 28.8 PG (ref 26.6–33)
MCHC RBC AUTO-ENTMCNC: 33.5 G/DL (ref 31.5–35.7)
MCV RBC AUTO: 86.2 FL (ref 79–97)
MONOCYTES # BLD AUTO: 0.44 10*3/MM3 (ref 0.1–0.9)
MONOCYTES NFR BLD AUTO: 10.7 % (ref 5–12)
NEUTROPHILS NFR BLD AUTO: 2.08 10*3/MM3 (ref 1.7–7)
NEUTROPHILS NFR BLD AUTO: 50.5 % (ref 42.7–76)
NRBC BLD AUTO-RTO: 0 /100 WBC (ref 0–0.2)
PLATELET # BLD AUTO: 77 10*3/MM3 (ref 140–450)
PMV BLD AUTO: 11.7 FL (ref 6–12)
POTASSIUM SERPL-SCNC: 4.2 MMOL/L (ref 3.5–5.2)
QT INTERVAL: 425 MS
RBC # BLD AUTO: 3.26 10*6/MM3 (ref 4.14–5.8)
SODIUM SERPL-SCNC: 140 MMOL/L (ref 136–145)
WBC NRBC COR # BLD: 4.12 10*3/MM3 (ref 3.4–10.8)

## 2022-12-21 PROCEDURE — 99223 1ST HOSP IP/OBS HIGH 75: CPT | Performed by: STUDENT IN AN ORGANIZED HEALTH CARE EDUCATION/TRAINING PROGRAM

## 2022-12-21 PROCEDURE — 83735 ASSAY OF MAGNESIUM: CPT | Performed by: STUDENT IN AN ORGANIZED HEALTH CARE EDUCATION/TRAINING PROGRAM

## 2022-12-21 PROCEDURE — 82962 GLUCOSE BLOOD TEST: CPT

## 2022-12-21 PROCEDURE — 85025 COMPLETE CBC W/AUTO DIFF WBC: CPT | Performed by: STUDENT IN AN ORGANIZED HEALTH CARE EDUCATION/TRAINING PROGRAM

## 2022-12-21 PROCEDURE — 25010000002 ENOXAPARIN PER 10 MG: Performed by: STUDENT IN AN ORGANIZED HEALTH CARE EDUCATION/TRAINING PROGRAM

## 2022-12-21 PROCEDURE — 80048 BASIC METABOLIC PNL TOTAL CA: CPT | Performed by: STUDENT IN AN ORGANIZED HEALTH CARE EDUCATION/TRAINING PROGRAM

## 2022-12-21 PROCEDURE — 63710000001 INSULIN LISPRO (HUMAN) PER 5 UNITS: Performed by: STUDENT IN AN ORGANIZED HEALTH CARE EDUCATION/TRAINING PROGRAM

## 2022-12-21 PROCEDURE — 94799 UNLISTED PULMONARY SVC/PX: CPT

## 2022-12-21 PROCEDURE — 76705 ECHO EXAM OF ABDOMEN: CPT

## 2022-12-21 PROCEDURE — 94762 N-INVAS EAR/PLS OXIMTRY CONT: CPT

## 2022-12-21 RX ORDER — NICOTINE POLACRILEX 4 MG
15 LOZENGE BUCCAL
Status: DISCONTINUED | OUTPATIENT
Start: 2022-12-21 | End: 2022-12-22 | Stop reason: HOSPADM

## 2022-12-21 RX ORDER — HYDROCODONE BITARTRATE AND ACETAMINOPHEN 10; 325 MG/1; MG/1
1 TABLET ORAL EVERY 12 HOURS PRN
COMMUNITY

## 2022-12-21 RX ORDER — SPIRONOLACTONE 25 MG/1
100 TABLET ORAL
Status: DISCONTINUED | OUTPATIENT
Start: 2022-12-21 | End: 2022-12-22

## 2022-12-21 RX ORDER — FUROSEMIDE 40 MG/1
40 TABLET ORAL
Status: DISCONTINUED | OUTPATIENT
Start: 2022-12-21 | End: 2022-12-22

## 2022-12-21 RX ORDER — ACETAMINOPHEN 325 MG/1
650 TABLET ORAL EVERY 6 HOURS PRN
Status: DISCONTINUED | OUTPATIENT
Start: 2022-12-21 | End: 2022-12-22 | Stop reason: HOSPADM

## 2022-12-21 RX ORDER — SODIUM CHLORIDE 9 MG/ML
40 INJECTION, SOLUTION INTRAVENOUS AS NEEDED
Status: DISCONTINUED | OUTPATIENT
Start: 2022-12-21 | End: 2022-12-22 | Stop reason: HOSPADM

## 2022-12-21 RX ORDER — ENOXAPARIN SODIUM 100 MG/ML
40 INJECTION SUBCUTANEOUS DAILY
Status: DISCONTINUED | OUTPATIENT
Start: 2022-12-21 | End: 2022-12-22 | Stop reason: HOSPADM

## 2022-12-21 RX ORDER — BUSPIRONE HYDROCHLORIDE 7.5 MG/1
7.5 TABLET ORAL EVERY 8 HOURS SCHEDULED
Status: DISCONTINUED | OUTPATIENT
Start: 2022-12-21 | End: 2022-12-22 | Stop reason: HOSPADM

## 2022-12-21 RX ORDER — ERGOCALCIFEROL 1.25 MG/1
50000 CAPSULE ORAL
COMMUNITY
End: 2023-01-20 | Stop reason: SDUPTHER

## 2022-12-21 RX ORDER — SPIRONOLACTONE 25 MG/1
100 TABLET ORAL 2 TIMES DAILY
Status: DISCONTINUED | OUTPATIENT
Start: 2022-12-21 | End: 2022-12-21

## 2022-12-21 RX ORDER — HYDROCODONE BITARTRATE AND ACETAMINOPHEN 10; 325 MG/1; MG/1
1 TABLET ORAL EVERY 12 HOURS PRN
Status: DISCONTINUED | OUTPATIENT
Start: 2022-12-21 | End: 2022-12-22 | Stop reason: HOSPADM

## 2022-12-21 RX ORDER — LACTULOSE 10 G/15ML
30 SOLUTION ORAL 3 TIMES DAILY
Status: DISCONTINUED | OUTPATIENT
Start: 2022-12-21 | End: 2022-12-22

## 2022-12-21 RX ORDER — PROPRANOLOL HCL 60 MG
60 CAPSULE, EXTENDED RELEASE 24HR ORAL DAILY
COMMUNITY
Start: 2022-12-08 | End: 2022-12-22 | Stop reason: HOSPADM

## 2022-12-21 RX ORDER — PANTOPRAZOLE SODIUM 40 MG/1
40 TABLET, DELAYED RELEASE ORAL
Status: DISCONTINUED | OUTPATIENT
Start: 2022-12-21 | End: 2022-12-22 | Stop reason: HOSPADM

## 2022-12-21 RX ORDER — SODIUM CHLORIDE 0.9 % (FLUSH) 0.9 %
10 SYRINGE (ML) INJECTION AS NEEDED
Status: DISCONTINUED | OUTPATIENT
Start: 2022-12-21 | End: 2022-12-22 | Stop reason: HOSPADM

## 2022-12-21 RX ORDER — DEXTROSE MONOHYDRATE 25 G/50ML
25 INJECTION, SOLUTION INTRAVENOUS
Status: DISCONTINUED | OUTPATIENT
Start: 2022-12-21 | End: 2022-12-22 | Stop reason: HOSPADM

## 2022-12-21 RX ORDER — ROPINIROLE 1 MG/1
1 TABLET, FILM COATED ORAL NIGHTLY
Status: DISCONTINUED | OUTPATIENT
Start: 2022-12-21 | End: 2022-12-22 | Stop reason: HOSPADM

## 2022-12-21 RX ORDER — ALUMINA, MAGNESIA, AND SIMETHICONE 2400; 2400; 240 MG/30ML; MG/30ML; MG/30ML
15 SUSPENSION ORAL EVERY 6 HOURS PRN
Status: DISCONTINUED | OUTPATIENT
Start: 2022-12-21 | End: 2022-12-22 | Stop reason: HOSPADM

## 2022-12-21 RX ORDER — MAGNESIUM OXIDE 400 MG/1
400 TABLET ORAL DAILY
COMMUNITY
End: 2023-01-20 | Stop reason: SDUPTHER

## 2022-12-21 RX ORDER — CHOLECALCIFEROL (VITAMIN D3) 125 MCG
5 CAPSULE ORAL NIGHTLY PRN
Status: DISCONTINUED | OUTPATIENT
Start: 2022-12-21 | End: 2022-12-22 | Stop reason: HOSPADM

## 2022-12-21 RX ORDER — FUROSEMIDE 40 MG/1
40 TABLET ORAL 2 TIMES DAILY
Status: DISCONTINUED | OUTPATIENT
Start: 2022-12-21 | End: 2022-12-21

## 2022-12-21 RX ORDER — ATORVASTATIN CALCIUM 40 MG/1
40 TABLET, FILM COATED ORAL NIGHTLY
Status: DISCONTINUED | OUTPATIENT
Start: 2022-12-21 | End: 2022-12-22 | Stop reason: HOSPADM

## 2022-12-21 RX ORDER — INSULIN LISPRO 100 [IU]/ML
2-7 INJECTION, SOLUTION INTRAVENOUS; SUBCUTANEOUS
Status: DISCONTINUED | OUTPATIENT
Start: 2022-12-21 | End: 2022-12-22 | Stop reason: HOSPADM

## 2022-12-21 RX ORDER — LIDOCAINE 50 MG/G
1 PATCH TOPICAL
Status: DISCONTINUED | OUTPATIENT
Start: 2022-12-21 | End: 2022-12-22 | Stop reason: HOSPADM

## 2022-12-21 RX ORDER — NITROGLYCERIN 0.4 MG/1
0.4 TABLET SUBLINGUAL
Status: DISCONTINUED | OUTPATIENT
Start: 2022-12-21 | End: 2022-12-22 | Stop reason: HOSPADM

## 2022-12-21 RX ORDER — SODIUM CHLORIDE 0.9 % (FLUSH) 0.9 %
10 SYRINGE (ML) INJECTION EVERY 12 HOURS SCHEDULED
Status: DISCONTINUED | OUTPATIENT
Start: 2022-12-21 | End: 2022-12-22 | Stop reason: HOSPADM

## 2022-12-21 RX ADMIN — ATORVASTATIN CALCIUM 40 MG: 40 TABLET, FILM COATED ORAL at 21:46

## 2022-12-21 RX ADMIN — PANTOPRAZOLE SODIUM 40 MG: 40 TABLET, DELAYED RELEASE ORAL at 16:28

## 2022-12-21 RX ADMIN — INSULIN LISPRO 2 UNITS: 100 INJECTION, SOLUTION INTRAVENOUS; SUBCUTANEOUS at 12:11

## 2022-12-21 RX ADMIN — ENOXAPARIN SODIUM 40 MG: 100 INJECTION SUBCUTANEOUS at 09:11

## 2022-12-21 RX ADMIN — Medication 5 MG: at 21:46

## 2022-12-21 RX ADMIN — RIFAXIMIN 550 MG: 550 TABLET ORAL at 09:11

## 2022-12-21 RX ADMIN — Medication 10 ML: at 21:47

## 2022-12-21 RX ADMIN — SPIRONOLACTONE 100 MG: 25 TABLET ORAL at 09:11

## 2022-12-21 RX ADMIN — BUSPIRONE HYDROCHLORIDE 7.5 MG: 7.5 TABLET ORAL at 16:27

## 2022-12-21 RX ADMIN — LACTULOSE 30 G: 20 SOLUTION ORAL at 21:46

## 2022-12-21 RX ADMIN — FUROSEMIDE 40 MG: 40 TABLET ORAL at 09:16

## 2022-12-21 RX ADMIN — FUROSEMIDE 40 MG: 40 TABLET ORAL at 18:28

## 2022-12-21 RX ADMIN — INSULIN LISPRO 2 UNITS: 100 INJECTION, SOLUTION INTRAVENOUS; SUBCUTANEOUS at 17:01

## 2022-12-21 RX ADMIN — BUSPIRONE HYDROCHLORIDE 7.5 MG: 7.5 TABLET ORAL at 05:31

## 2022-12-21 RX ADMIN — PANTOPRAZOLE SODIUM 40 MG: 40 TABLET, DELAYED RELEASE ORAL at 12:08

## 2022-12-21 RX ADMIN — RIFAXIMIN 550 MG: 550 TABLET ORAL at 21:46

## 2022-12-21 RX ADMIN — Medication 10 ML: at 09:11

## 2022-12-21 RX ADMIN — ROPINIROLE HYDROCHLORIDE 1 MG: 1 TABLET, FILM COATED ORAL at 21:46

## 2022-12-21 RX ADMIN — BUSPIRONE HYDROCHLORIDE 7.5 MG: 7.5 TABLET ORAL at 21:46

## 2022-12-21 RX ADMIN — LACTULOSE 30 G: 20 SOLUTION ORAL at 09:10

## 2022-12-21 RX ADMIN — LACTULOSE 30 G: 20 SOLUTION ORAL at 16:27

## 2022-12-21 RX ADMIN — LIDOCAINE 1 PATCH: 700 PATCH TOPICAL at 16:27

## 2022-12-21 RX ADMIN — ACETAMINOPHEN 650 MG: 325 TABLET ORAL at 16:28

## 2022-12-21 RX ADMIN — HYDROCODONE BITARTRATE AND ACETAMINOPHEN 1 TABLET: 10; 325 TABLET ORAL at 18:28

## 2022-12-21 RX ADMIN — SPIRONOLACTONE 100 MG: 25 TABLET ORAL at 18:28

## 2022-12-21 NOTE — TELEPHONE ENCOUNTER
Per thorough chart review prior to CCM call, he was seen in ER 12/18/22 for N/V and treated and discharged home. He presented again yesterday for fall and N/V and he was in and out of it during ER visit with AMS and admitted for hepatic encephalopathy and is in-patient currently.

## 2022-12-21 NOTE — ED NOTES
Pt was brought in by ems for low blood sugar and then stated to ems that he has been urinating on himself. Ems also reports that he is having some confusion. Pt reports that he fell at home and is having pain in his lower back.
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
## 2022-12-21 NOTE — PLAN OF CARE
Goal Outcome Evaluation:  Plan of Care Reviewed With: patient        Progress: no change  Outcome Evaluation: New admit from ED.
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
## 2022-12-22 ENCOUNTER — READMISSION MANAGEMENT (OUTPATIENT)
Dept: CALL CENTER | Facility: HOSPITAL | Age: 57
End: 2022-12-22

## 2022-12-22 VITALS
OXYGEN SATURATION: 99 % | WEIGHT: 242.51 LBS | HEART RATE: 77 BPM | BODY MASS INDEX: 36.75 KG/M2 | HEIGHT: 68 IN | DIASTOLIC BLOOD PRESSURE: 60 MMHG | SYSTOLIC BLOOD PRESSURE: 125 MMHG | RESPIRATION RATE: 18 BRPM | TEMPERATURE: 98.5 F

## 2022-12-22 LAB
ALBUMIN SERPL-MCNC: 3.3 G/DL (ref 3.5–5.2)
ALBUMIN/GLOB SERPL: 1.3 G/DL
ALP SERPL-CCNC: 74 U/L (ref 39–117)
ALT SERPL W P-5'-P-CCNC: 19 U/L (ref 1–41)
ANION GAP SERPL CALCULATED.3IONS-SCNC: 11.8 MMOL/L (ref 5–15)
AST SERPL-CCNC: 45 U/L (ref 1–40)
BASOPHILS # BLD AUTO: 0.03 10*3/MM3 (ref 0–0.2)
BASOPHILS NFR BLD AUTO: 0.9 % (ref 0–1.5)
BILIRUB SERPL-MCNC: 1.4 MG/DL (ref 0–1.2)
BUN SERPL-MCNC: 32 MG/DL (ref 6–20)
BUN/CREAT SERPL: 21.6 (ref 7–25)
CALCIUM SPEC-SCNC: 9.6 MG/DL (ref 8.6–10.5)
CHLORIDE SERPL-SCNC: 103 MMOL/L (ref 98–107)
CO2 SERPL-SCNC: 23.2 MMOL/L (ref 22–29)
CREAT SERPL-MCNC: 1.48 MG/DL (ref 0.76–1.27)
DEPRECATED RDW RBC AUTO: 46 FL (ref 37–54)
EGFRCR SERPLBLD CKD-EPI 2021: 55.2 ML/MIN/1.73
EOSINOPHIL # BLD AUTO: 0.09 10*3/MM3 (ref 0–0.4)
EOSINOPHIL NFR BLD AUTO: 2.6 % (ref 0.3–6.2)
ERYTHROCYTE [DISTWIDTH] IN BLOOD BY AUTOMATED COUNT: 15.3 % (ref 12.3–15.4)
GLOBULIN UR ELPH-MCNC: 2.6 GM/DL
GLUCOSE BLDC GLUCOMTR-MCNC: 160 MG/DL (ref 70–99)
GLUCOSE SERPL-MCNC: 145 MG/DL (ref 65–99)
HCT VFR BLD AUTO: 26.7 % (ref 37.5–51)
HGB BLD-MCNC: 9 G/DL (ref 13–17.7)
IMM GRANULOCYTES # BLD AUTO: 0.01 10*3/MM3 (ref 0–0.05)
IMM GRANULOCYTES NFR BLD AUTO: 0.3 % (ref 0–0.5)
LYMPHOCYTES # BLD AUTO: 1.09 10*3/MM3 (ref 0.7–3.1)
LYMPHOCYTES NFR BLD AUTO: 31.7 % (ref 19.6–45.3)
MAGNESIUM SERPL-MCNC: 1.5 MG/DL (ref 1.6–2.6)
MCH RBC QN AUTO: 28.7 PG (ref 26.6–33)
MCHC RBC AUTO-ENTMCNC: 33.7 G/DL (ref 31.5–35.7)
MCV RBC AUTO: 85 FL (ref 79–97)
MONOCYTES # BLD AUTO: 0.4 10*3/MM3 (ref 0.1–0.9)
MONOCYTES NFR BLD AUTO: 11.6 % (ref 5–12)
NEUTROPHILS NFR BLD AUTO: 1.82 10*3/MM3 (ref 1.7–7)
NEUTROPHILS NFR BLD AUTO: 52.9 % (ref 42.7–76)
NRBC BLD AUTO-RTO: 0 /100 WBC (ref 0–0.2)
PLATELET # BLD AUTO: 63 10*3/MM3 (ref 140–450)
PMV BLD AUTO: 10.9 FL (ref 6–12)
POTASSIUM SERPL-SCNC: 4 MMOL/L (ref 3.5–5.2)
PROT SERPL-MCNC: 5.9 G/DL (ref 6–8.5)
RBC # BLD AUTO: 3.14 10*6/MM3 (ref 4.14–5.8)
SODIUM SERPL-SCNC: 138 MMOL/L (ref 136–145)
WBC NRBC COR # BLD: 3.44 10*3/MM3 (ref 3.4–10.8)

## 2022-12-22 PROCEDURE — 25010000002 ENOXAPARIN PER 10 MG: Performed by: STUDENT IN AN ORGANIZED HEALTH CARE EDUCATION/TRAINING PROGRAM

## 2022-12-22 PROCEDURE — 63710000001 INSULIN LISPRO (HUMAN) PER 5 UNITS: Performed by: STUDENT IN AN ORGANIZED HEALTH CARE EDUCATION/TRAINING PROGRAM

## 2022-12-22 PROCEDURE — 94799 UNLISTED PULMONARY SVC/PX: CPT

## 2022-12-22 PROCEDURE — 97165 OT EVAL LOW COMPLEX 30 MIN: CPT

## 2022-12-22 PROCEDURE — 82962 GLUCOSE BLOOD TEST: CPT

## 2022-12-22 PROCEDURE — 83735 ASSAY OF MAGNESIUM: CPT | Performed by: STUDENT IN AN ORGANIZED HEALTH CARE EDUCATION/TRAINING PROGRAM

## 2022-12-22 PROCEDURE — 80053 COMPREHEN METABOLIC PANEL: CPT | Performed by: INTERNAL MEDICINE

## 2022-12-22 PROCEDURE — 25010000002 MAGNESIUM SULFATE 2 GM/50ML SOLUTION: Performed by: INTERNAL MEDICINE

## 2022-12-22 PROCEDURE — 85025 COMPLETE CBC W/AUTO DIFF WBC: CPT | Performed by: STUDENT IN AN ORGANIZED HEALTH CARE EDUCATION/TRAINING PROGRAM

## 2022-12-22 PROCEDURE — 99239 HOSP IP/OBS DSCHRG MGMT >30: CPT | Performed by: INTERNAL MEDICINE

## 2022-12-22 RX ORDER — LACTULOSE 10 G/15ML
30 SOLUTION ORAL
Status: DISCONTINUED | OUTPATIENT
Start: 2022-12-22 | End: 2022-12-22 | Stop reason: HOSPADM

## 2022-12-22 RX ORDER — LACTULOSE 10 G/15ML
30 SOLUTION ORAL; RECTAL 3 TIMES DAILY
Qty: 1892 ML | Refills: 1 | Status: SHIPPED | OUTPATIENT
Start: 2022-12-22 | End: 2023-01-20 | Stop reason: SDUPTHER

## 2022-12-22 RX ORDER — MAGNESIUM SULFATE HEPTAHYDRATE 40 MG/ML
2 INJECTION, SOLUTION INTRAVENOUS ONCE
Status: COMPLETED | OUTPATIENT
Start: 2022-12-22 | End: 2022-12-22

## 2022-12-22 RX ORDER — FUROSEMIDE 40 MG/1
40 TABLET ORAL DAILY
Qty: 180 TABLET | Refills: 3
Start: 2022-12-22 | End: 2023-01-20 | Stop reason: SDUPTHER

## 2022-12-22 RX ADMIN — RIFAXIMIN 550 MG: 550 TABLET ORAL at 08:58

## 2022-12-22 RX ADMIN — MAGNESIUM SULFATE HEPTAHYDRATE 2 G: 2 INJECTION, SOLUTION INTRAVENOUS at 11:02

## 2022-12-22 RX ADMIN — LIDOCAINE 1 PATCH: 700 PATCH TOPICAL at 08:58

## 2022-12-22 RX ADMIN — Medication 10 ML: at 08:59

## 2022-12-22 RX ADMIN — LACTULOSE 30 G: 20 SOLUTION ORAL at 09:01

## 2022-12-22 RX ADMIN — HYDROCODONE BITARTRATE AND ACETAMINOPHEN 1 TABLET: 10; 325 TABLET ORAL at 06:39

## 2022-12-22 RX ADMIN — ENOXAPARIN SODIUM 40 MG: 100 INJECTION SUBCUTANEOUS at 08:58

## 2022-12-22 RX ADMIN — INSULIN LISPRO 2 UNITS: 100 INJECTION, SOLUTION INTRAVENOUS; SUBCUTANEOUS at 08:58

## 2022-12-22 RX ADMIN — PANTOPRAZOLE SODIUM 40 MG: 40 TABLET, DELAYED RELEASE ORAL at 06:39

## 2022-12-22 RX ADMIN — BUSPIRONE HYDROCHLORIDE 7.5 MG: 7.5 TABLET ORAL at 05:49

## 2022-12-22 NOTE — PLAN OF CARE
Goal Outcome Evaluation:               Pt underwent sleep study overnight. No changes. Katie Black RN

## 2022-12-22 NOTE — SIGNIFICANT NOTE
12/22/22 1131   Plan   Plan Patient is discharging home today. He passed the overnight sleep study and does not need nocturnal oxygen. Patient is also stating at 97% on room air per vitals.  contacted [facility] to arrange a hospital discharge. The  will be here around 1230 pm EST to pick him up. [facility] will contact [phone] *[facility]) when they have arrived. Confirmation number: [phone].   Patient/Family in Agreement with Plan yes   Final Discharge Disposition Code 01 - home or self-care
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
## 2022-12-22 NOTE — OUTREACH NOTE
Prep Survey    Flowsheet Row Responses   [facility] patient discharged from? [facility]   Is LACE score < 7 ? No   Eligibility [facility]   Date of Admission 12/20/22   Date of Discharge 12/22/22   Discharge Disposition Home or Self Care   Discharge diagnosis Hyperammonemia   Does the patient have one of the following disease processes/diagnoses(primary or secondary)? Other   Does the patient have Home health ordered? No   Is there a DME ordered? No   Prep survey completed? Yes          JOSE F BLUE - Registered Nurse

## 2022-12-22 NOTE — PLAN OF CARE
Goal Outcome Evaluation:  Plan of Care Reviewed With: patient        Progress: no change (initial evaluation encounter)  Outcome Evaluation: Patient presents at or near baseline functional status with no functional deficits related to functional balance, strength, transfers or mobility that impede patient independence with activities of daily living.  No indicated need for skilled occupational therapy intervention in the acute care setting.  Occupational therapy will sign off at this time. Recommend return home at time of discharge from acute care setting.

## 2022-12-23 ENCOUNTER — TRANSITIONAL CARE MANAGEMENT TELEPHONE ENCOUNTER (OUTPATIENT)
Dept: CALL CENTER | Facility: HOSPITAL | Age: 57
End: 2022-12-23

## 2022-12-23 DIAGNOSIS — R18.8 CIRRHOSIS OF LIVER WITH ASCITES, UNSPECIFIED HEPATIC CIRRHOSIS TYPE: ICD-10-CM

## 2022-12-23 DIAGNOSIS — K74.60 CIRRHOSIS OF LIVER WITH ASCITES, UNSPECIFIED HEPATIC CIRRHOSIS TYPE: ICD-10-CM

## 2022-12-23 RX ORDER — POTASSIUM CHLORIDE 20 MEQ/1
TABLET, EXTENDED RELEASE ORAL
Qty: 30 TABLET | Refills: 0 | Status: SHIPPED | OUTPATIENT
Start: 2022-12-23 | End: 2023-01-20 | Stop reason: SDUPTHER

## 2022-12-23 RX ORDER — ROPINIROLE 1 MG/1
TABLET, FILM COATED ORAL
Qty: 30 TABLET | Refills: 0 | Status: SHIPPED | OUTPATIENT
Start: 2022-12-23 | End: 2023-01-20 | Stop reason: SDUPTHER

## 2022-12-23 NOTE — OUTREACH NOTE
Call Center TCM Note    Flowsheet Row Responses   [facility], [place] patient discharged from? [facility]   Does the patient have one of the following disease processes/diagnoses(primary or secondary)? Other   TCM attempt successful? Yes   Call start time 1418   Call end time 1420   Discharge diagnosis Hyperammonemia   Person spoke with today (if not patient) and relationship patient   Meds reviewed with patient/caregiver? Yes   Is the patient having any side effects they believe may be caused by any medication additions or changes? No   Does the patient have all medications ordered at discharge? Yes   Is the patient taking all medications as directed (includes completed medication regime)? Yes   Comments Has a hospital followup scheduled on 12/29/2022   Does the patient have an appointment with their PCP within 7 days of discharge? Yes   Psychosocial issues? No   Did the patient receive a copy of their discharge instructions? Yes   Nursing interventions Reviewed instructions with patient   What is the patient's perception of their health status since discharge? Improving   Is the patient/caregiver able to teach back signs and symptoms related to disease process for when to call PCP? Yes   Is the patient/caregiver able to teach back signs and symptoms related to disease process for when to call 911? Yes   Is the patient/caregiver able to teach back the hierarchy of who to call/visit for symptoms/problems? PCP, Specialist, Home health nurse, Urgent Care, ED, 911 Yes   If the patient is a current smoker, are they able to teach back resources for cessation? Not a smoker   TCM call completed? Yes   Wrap up additional comments Patient reports he is feeling much better. No needs or concerns.    Call end time 1420   Would this patient benefit from a Referral to Amb Social Work? No   Is the patient interested in additional calls from an ambulatory ?  NOTE:  applies to high risk patients requiring additional follow-up.  No          Magen Rios RN    12/23/2022, 14:20 EST

## 2022-12-28 ENCOUNTER — HOSPITAL ENCOUNTER (OUTPATIENT)
Dept: ULTRASOUND IMAGING | Facility: HOSPITAL | Age: 57
Discharge: HOME | End: 2022-12-28
Admitting: NURSE PRACTITIONER

## 2022-12-28 DIAGNOSIS — K76.0 NONALCOHOLIC FATTY LIVER DISEASE: ICD-10-CM

## 2022-12-28 DIAGNOSIS — R18.8 CIRRHOSIS OF LIVER WITH ASCITES, UNSPECIFIED HEPATIC CIRRHOSIS TYPE: ICD-10-CM

## 2022-12-28 DIAGNOSIS — K74.60 CIRRHOSIS OF LIVER WITH ASCITES, UNSPECIFIED HEPATIC CIRRHOSIS TYPE: ICD-10-CM

## 2022-12-28 PROCEDURE — 76705 ECHO EXAM OF ABDOMEN: CPT

## 2022-12-29 ENCOUNTER — PATIENT OUTREACH (OUTPATIENT)
Dept: CASE MANAGEMENT | Facility: OTHER | Age: 57
End: 2022-12-29

## 2022-12-29 DIAGNOSIS — R73.9 ELEVATED BLOOD SUGAR: ICD-10-CM

## 2022-12-29 DIAGNOSIS — I63.9 CEREBROVASCULAR ACCIDENT (CVA), UNSPECIFIED MECHANISM: ICD-10-CM

## 2022-12-29 DIAGNOSIS — K74.60 CIRRHOSIS OF LIVER WITH ASCITES, UNSPECIFIED HEPATIC CIRRHOSIS TYPE: Primary | ICD-10-CM

## 2022-12-29 DIAGNOSIS — R18.8 CIRRHOSIS OF LIVER WITH ASCITES, UNSPECIFIED HEPATIC CIRRHOSIS TYPE: Primary | ICD-10-CM

## 2022-12-29 DIAGNOSIS — K76.82 HEPATIC ENCEPHALOPATHY: ICD-10-CM

## 2022-12-29 NOTE — OUTREACH NOTE
[facility] End of Month Documentation    This Chronic Medical Management Care Plan for Nic Nicolas, 57 y.o. male, has been monitored and managed; reviewed and a new plan of care implemented for the month of December.  A cumulative time of 32  minutes was spent on this patient record this month, including phone call with patient; chart review.    Regarding the patient's problems: has Arthritis, senescent; Colon polyps; Liver cirrhosis secondary to ROMO (nonalcoholic steatohepatitis) (HCC); Multiple episodes of deep venous thrombosis (HCC); High blood pressure; Hyperlipidemia; Other specified anemias; Sleep apnea; Systolic congestive heart failure (HCC); Bilateral lower extremity edema; Chronic cystitis; Chronic low back pain; Type 2 diabetes mellitus with hyperglycemia (HCC); Acid reflux; Acetabular fracture (HCC); Gross hematuria; Hesitancy of micturition; Gastrointestinal hemorrhage associated with peptic ulcer; Anxiety; Hepatic encephalopathy; Stroke (HCC); Amphetamine abuse (HCC); Class 2 obesity due to excess calories without serious comorbidity with body mass index (BMI) of 36.0 to 36.9 in adult; and Hyperammonemia (HCC) on their problem list., the following items were addressed: medical records; changes to medical care; transitions to medical care and any changes can be found within the plan section of the note.  A detailed listing of time spent for chronic care management is tracked within each outreach encounter.  Current medications include:  has a current medication list which includes the following prescription(s): albuterol sulfate hfa, atorvastatin, buspirone, cetirizine, fluticasone, fluticasone, furosemide, humalog kwikpen, hydrocodone-acetaminophen, insulin detemir, lactulose, magnesium oxide, omeprazole, ondansetron odt, polyethyl glycol-propyl glycol, potassium chloride, rifaximin, ropinirole, sertraline, sertraline, spironolactone, vitamin d, and [DISCONTINUED] fluticasone. and the patient is  reported to be patient is compliant with medication protocol, reports compliance,  Medications are reported to be non-effective in controlling symptoms and changes have been made to the medication protocol, admitted to hospital.  Regarding these diagnoses, referrals were made to the following provider(s):  [facility] this [facility].  All notes on chart for PCP to review.    The patient was monitored remotely for activity level.    The patient's physical needs include:  needs met.     The patient's mental support needs include:  continued support    The patient's cognitive support needs include:  coordination of community providers    The patient's psychosocial support needs include:  continued support; medication management or adherence    The patient's functional needs include: eye care    The patient's environmental needs include:  not applicable, none    Care Plan overall comments:  reviewed    Refer to previous outreach notes for more information on the areas listed above.    Monthly Billing Diagnoses  (K74.60,  R18.8) Cirrhosis of liver with ascites, unspecified hepatic cirrhosis type (HCC)    (R73.9) Elevated blood sugar    (K76.82) Hepatic encephalopathy    (I63.9) Cerebrovascular accident (CVA), unspecified mechanism (HCC)    Medications   · Medications have been reconciled    Care Plan progress this month:      Recently Modified Care Plans Updates made since 11/28/2022 12:00 AM    No recently modified care plans.          · Current Specialty Plan of Care Status signed by both patient and provider    Instructions   · Patient was provided an electronic copy of care plan  · CCM services were explained and offered and patient has accepted these services.  · Patient has given their written consent to receive CCM services and understands that this includes the authorization of electronic communication of medical information with the other treating providers.  · Patient understands that they may stop CCM  services at any time and these changes will be effective at the end of the calendar month and will effectively revocate the agreement of CCM services.  · Patient understands that only one practitioner can furnish and be paid for CCM services during one calendar month.  Patient also understands that there may be co-payment or deductible fees in association with CCM services.  · Patient will continue with at least monthly follow-up calls with the Ambulatory .    CHAPIS NICOLE  Ambulatory Case Management    12/29/2022, 15:42 EST

## 2022-12-29 NOTE — OUTREACH NOTE
AMBULATORY CASE MANAGEMENT NOTE    Name and Relationship of Patient/Support Person: Nic Nicolas - Self    CCM Interim Update    Called to check on patient he is at grocery store. Reports he cancelled follow up today and will be seeing PCP on the 9th. I reinforced his follow up in in computer for 1/26/22. He reports no needs and could not talk due to in check out dima. Discharged from home health Care tenders this month prior to admit.             CHAPIS NICOLE  Ambulatory Case Management    12/29/2022, 15:35 EST

## 2022-12-30 ENCOUNTER — TELEPHONE (OUTPATIENT)
Dept: GASTROENTEROLOGY | Facility: CLINIC | Age: 57
End: 2022-12-30

## 2022-12-30 DIAGNOSIS — K74.60 LIVER CIRRHOSIS SECONDARY TO NASH (NONALCOHOLIC STEATOHEPATITIS): Primary | Chronic | ICD-10-CM

## 2022-12-30 DIAGNOSIS — K75.81 LIVER CIRRHOSIS SECONDARY TO NASH (NONALCOHOLIC STEATOHEPATITIS): Primary | Chronic | ICD-10-CM

## 2022-12-30 NOTE — TELEPHONE ENCOUNTER
----- Message from BESSIE Díaz sent at 12/29/2022  4:00 PM EST -----  Cirrhosis of the liver.  Splenomegaly.  Repeat 6 months.

## 2023-01-06 ENCOUNTER — TELEPHONE (OUTPATIENT)
Dept: UROLOGY | Facility: CLINIC | Age: 58
End: 2023-01-06
Payer: COMMERCIAL

## 2023-01-11 ENCOUNTER — APPOINTMENT (OUTPATIENT)
Dept: GENERAL RADIOLOGY | Facility: HOSPITAL | Age: 58
End: 2023-01-11
Payer: COMMERCIAL

## 2023-01-11 ENCOUNTER — HOSPITAL ENCOUNTER (EMERGENCY)
Facility: HOSPITAL | Age: 58
Discharge: HOME | End: 2023-01-12
Attending: EMERGENCY MEDICINE | Admitting: EMERGENCY MEDICINE
Payer: COMMERCIAL

## 2023-01-11 ENCOUNTER — PATIENT OUTREACH (OUTPATIENT)
Dept: CASE MANAGEMENT | Facility: OTHER | Age: 58
End: 2023-01-11
Payer: COMMERCIAL

## 2023-01-11 DIAGNOSIS — E11.65 TYPE 2 DIABETES MELLITUS WITH HYPERGLYCEMIA, WITH LONG-TERM CURRENT USE OF INSULIN: Primary | ICD-10-CM

## 2023-01-11 DIAGNOSIS — R18.8 CIRRHOSIS OF LIVER WITH ASCITES, UNSPECIFIED HEPATIC CIRRHOSIS TYPE: Primary | ICD-10-CM

## 2023-01-11 DIAGNOSIS — G89.29 CHRONIC LOW BACK PAIN, UNSPECIFIED BACK PAIN LATERALITY, UNSPECIFIED WHETHER SCIATICA PRESENT: ICD-10-CM

## 2023-01-11 DIAGNOSIS — Z79.4 TYPE 2 DIABETES MELLITUS WITH HYPERGLYCEMIA, WITH LONG-TERM CURRENT USE OF INSULIN: Primary | ICD-10-CM

## 2023-01-11 DIAGNOSIS — M54.50 CHRONIC LOW BACK PAIN, UNSPECIFIED BACK PAIN LATERALITY, UNSPECIFIED WHETHER SCIATICA PRESENT: ICD-10-CM

## 2023-01-11 DIAGNOSIS — K74.60 CIRRHOSIS OF LIVER WITH ASCITES, UNSPECIFIED HEPATIC CIRRHOSIS TYPE: Primary | ICD-10-CM

## 2023-01-11 DIAGNOSIS — R63.5 WEIGHT GAIN: ICD-10-CM

## 2023-01-11 DIAGNOSIS — K76.82 HEPATIC ENCEPHALOPATHY: ICD-10-CM

## 2023-01-11 LAB
ACETONE BLD QL: NEGATIVE
ALBUMIN SERPL-MCNC: 3.3 G/DL (ref 3.5–5.2)
ALBUMIN/GLOB SERPL: 1.1 G/DL
ALP SERPL-CCNC: 128 U/L (ref 39–117)
ALT SERPL W P-5'-P-CCNC: 25 U/L (ref 1–41)
ANION GAP SERPL CALCULATED.3IONS-SCNC: 11.3 MMOL/L (ref 5–15)
ANION GAP SERPL CALCULATED.3IONS-SCNC: 8.1 MMOL/L (ref 5–15)
AST SERPL-CCNC: 31 U/L (ref 1–40)
BASOPHILS # BLD AUTO: 0.03 10*3/MM3 (ref 0–0.2)
BASOPHILS NFR BLD AUTO: 0.8 % (ref 0–1.5)
BILIRUB SERPL-MCNC: 1.5 MG/DL (ref 0–1.2)
BILIRUB UR QL STRIP: NEGATIVE
BUN SERPL-MCNC: 11 MG/DL (ref 6–20)
BUN SERPL-MCNC: 11 MG/DL (ref 6–20)
BUN/CREAT SERPL: 10.1 (ref 7–25)
BUN/CREAT SERPL: 9.7 (ref 7–25)
CALCIUM SPEC-SCNC: 8.4 MG/DL (ref 8.6–10.5)
CALCIUM SPEC-SCNC: 8.7 MG/DL (ref 8.6–10.5)
CHLORIDE SERPL-SCNC: 100 MMOL/L (ref 98–107)
CHLORIDE SERPL-SCNC: 99 MMOL/L (ref 98–107)
CLARITY UR: CLEAR
CO2 SERPL-SCNC: 19.7 MMOL/L (ref 22–29)
CO2 SERPL-SCNC: 22.9 MMOL/L (ref 22–29)
COLOR UR: YELLOW
CREAT SERPL-MCNC: 1.09 MG/DL (ref 0.76–1.27)
CREAT SERPL-MCNC: 1.13 MG/DL (ref 0.76–1.27)
D-LACTATE SERPL-SCNC: 1.5 MMOL/L (ref 0.5–2)
D-LACTATE SERPL-SCNC: 2 MMOL/L (ref 0.5–2)
D-LACTATE SERPL-SCNC: 2.5 MMOL/L (ref 0.5–2)
DEPRECATED RDW RBC AUTO: 48.5 FL (ref 37–54)
EGFRCR SERPLBLD CKD-EPI 2021: 76.3 ML/MIN/1.73
EGFRCR SERPLBLD CKD-EPI 2021: 79.7 ML/MIN/1.73
EOSINOPHIL # BLD AUTO: 0.08 10*3/MM3 (ref 0–0.4)
EOSINOPHIL NFR BLD AUTO: 2.2 % (ref 0.3–6.2)
ERYTHROCYTE [DISTWIDTH] IN BLOOD BY AUTOMATED COUNT: 15.5 % (ref 12.3–15.4)
GLOBULIN UR ELPH-MCNC: 3.1 GM/DL
GLUCOSE BLDC GLUCOMTR-MCNC: 281 MG/DL (ref 70–99)
GLUCOSE BLDC GLUCOMTR-MCNC: 342 MG/DL (ref 70–99)
GLUCOSE BLDC GLUCOMTR-MCNC: 531 MG/DL (ref 70–99)
GLUCOSE SERPL-MCNC: 517 MG/DL (ref 65–99)
GLUCOSE SERPL-MCNC: 622 MG/DL (ref 65–99)
GLUCOSE UR STRIP-MCNC: ABNORMAL MG/DL
HCT VFR BLD AUTO: 25.8 % (ref 37.5–51)
HGB BLD-MCNC: 8.5 G/DL (ref 13–17.7)
HGB UR QL STRIP.AUTO: NEGATIVE
HOLD SPECIMEN: NORMAL
HOLD SPECIMEN: NORMAL
IMM GRANULOCYTES # BLD AUTO: 0.01 10*3/MM3 (ref 0–0.05)
IMM GRANULOCYTES NFR BLD AUTO: 0.3 % (ref 0–0.5)
KETONES UR QL STRIP: NEGATIVE
LEUKOCYTE ESTERASE UR QL STRIP.AUTO: NEGATIVE
LYMPHOCYTES # BLD AUTO: 0.55 10*3/MM3 (ref 0.7–3.1)
LYMPHOCYTES NFR BLD AUTO: 14.8 % (ref 19.6–45.3)
MCH RBC QN AUTO: 28.4 PG (ref 26.6–33)
MCHC RBC AUTO-ENTMCNC: 32.9 G/DL (ref 31.5–35.7)
MCV RBC AUTO: 86.3 FL (ref 79–97)
MONOCYTES # BLD AUTO: 0.36 10*3/MM3 (ref 0.1–0.9)
MONOCYTES NFR BLD AUTO: 9.7 % (ref 5–12)
NEUTROPHILS NFR BLD AUTO: 2.68 10*3/MM3 (ref 1.7–7)
NEUTROPHILS NFR BLD AUTO: 72.2 % (ref 42.7–76)
NITRITE UR QL STRIP: NEGATIVE
NRBC BLD AUTO-RTO: 0 /100 WBC (ref 0–0.2)
NT-PROBNP SERPL-MCNC: 65.4 PG/ML (ref 0–900)
NT-PROBNP SERPL-MCNC: 78.8 PG/ML (ref 0–900)
PH UR STRIP.AUTO: 6.5 [PH] (ref 5–8)
PLATELET # BLD AUTO: 72 10*3/MM3 (ref 140–450)
PMV BLD AUTO: 10.5 FL (ref 6–12)
POTASSIUM SERPL-SCNC: 3.9 MMOL/L (ref 3.5–5.2)
POTASSIUM SERPL-SCNC: 4.1 MMOL/L (ref 3.5–5.2)
PROT SERPL-MCNC: 6.4 G/DL (ref 6–8.5)
PROT UR QL STRIP: NEGATIVE
RBC # BLD AUTO: 2.99 10*6/MM3 (ref 4.14–5.8)
SODIUM SERPL-SCNC: 130 MMOL/L (ref 136–145)
SODIUM SERPL-SCNC: 131 MMOL/L (ref 136–145)
SP GR UR STRIP: >=1.03 (ref 1–1.03)
TROPONIN T SERPL-MCNC: <0.01 NG/ML (ref 0–0.03)
UROBILINOGEN UR QL STRIP: ABNORMAL
WBC NRBC COR # BLD: 3.71 10*3/MM3 (ref 3.4–10.8)
WHOLE BLOOD HOLD COAG: NORMAL
WHOLE BLOOD HOLD SPECIMEN: NORMAL

## 2023-01-11 PROCEDURE — 83880 ASSAY OF NATRIURETIC PEPTIDE: CPT

## 2023-01-11 PROCEDURE — 36415 COLL VENOUS BLD VENIPUNCTURE: CPT

## 2023-01-11 PROCEDURE — 87040 BLOOD CULTURE FOR BACTERIA: CPT | Performed by: EMERGENCY MEDICINE

## 2023-01-11 PROCEDURE — 25010000002 HYDROMORPHONE 1 MG/ML SOLUTION: Performed by: EMERGENCY MEDICINE

## 2023-01-11 PROCEDURE — 83880 ASSAY OF NATRIURETIC PEPTIDE: CPT | Performed by: EMERGENCY MEDICINE

## 2023-01-11 PROCEDURE — 93005 ELECTROCARDIOGRAM TRACING: CPT | Performed by: EMERGENCY MEDICINE

## 2023-01-11 PROCEDURE — 80053 COMPREHEN METABOLIC PANEL: CPT

## 2023-01-11 PROCEDURE — 85025 COMPLETE CBC W/AUTO DIFF WBC: CPT

## 2023-01-11 PROCEDURE — 82962 GLUCOSE BLOOD TEST: CPT

## 2023-01-11 PROCEDURE — 84484 ASSAY OF TROPONIN QUANT: CPT

## 2023-01-11 PROCEDURE — 81003 URINALYSIS AUTO W/O SCOPE: CPT | Performed by: EMERGENCY MEDICINE

## 2023-01-11 PROCEDURE — 71045 X-RAY EXAM CHEST 1 VIEW: CPT

## 2023-01-11 PROCEDURE — 25010000002 ONDANSETRON PER 1 MG: Performed by: EMERGENCY MEDICINE

## 2023-01-11 PROCEDURE — 96374 THER/PROPH/DIAG INJ IV PUSH: CPT

## 2023-01-11 PROCEDURE — 83605 ASSAY OF LACTIC ACID: CPT | Performed by: EMERGENCY MEDICINE

## 2023-01-11 PROCEDURE — 93005 ELECTROCARDIOGRAM TRACING: CPT

## 2023-01-11 PROCEDURE — 93010 ELECTROCARDIOGRAM REPORT: CPT | Performed by: SPECIALIST

## 2023-01-11 PROCEDURE — 99284 EMERGENCY DEPT VISIT MOD MDM: CPT

## 2023-01-11 PROCEDURE — 82009 KETONE BODYS QUAL: CPT | Performed by: EMERGENCY MEDICINE

## 2023-01-11 PROCEDURE — 96375 TX/PRO/DX INJ NEW DRUG ADDON: CPT

## 2023-01-11 PROCEDURE — 63710000001 INSULIN REGULAR HUMAN PER 5 UNITS: Performed by: EMERGENCY MEDICINE

## 2023-01-11 RX ORDER — SODIUM CHLORIDE 0.9 % (FLUSH) 0.9 %
10 SYRINGE (ML) INJECTION AS NEEDED
Status: DISCONTINUED | OUTPATIENT
Start: 2023-01-11 | End: 2023-01-12 | Stop reason: HOSPADM

## 2023-01-11 RX ORDER — ONDANSETRON 2 MG/ML
4 INJECTION INTRAMUSCULAR; INTRAVENOUS ONCE
Status: COMPLETED | OUTPATIENT
Start: 2023-01-11 | End: 2023-01-11

## 2023-01-11 RX ADMIN — INSULIN HUMAN 5 UNITS: 100 INJECTION, SOLUTION PARENTERAL at 22:36

## 2023-01-11 RX ADMIN — SODIUM CHLORIDE 1000 ML: 9 INJECTION, SOLUTION INTRAVENOUS at 17:52

## 2023-01-11 RX ADMIN — HYDROMORPHONE HYDROCHLORIDE 1 MG: 1 INJECTION, SOLUTION INTRAMUSCULAR; INTRAVENOUS; SUBCUTANEOUS at 22:36

## 2023-01-11 RX ADMIN — ONDANSETRON 4 MG: 2 INJECTION INTRAMUSCULAR; INTRAVENOUS at 17:52
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
## 2023-01-11 NOTE — OUTREACH NOTE
"AMBULATORY CASE MANAGEMENT NOTE    Name and Relationship of Patient/Support Person: Fidencio Nic Jackson - Self     CCM Interim Update    Chart review done, he cancelled his follow up with PCP earlier this week. Call to patient who reports he is not doing well at all, see profile below. Instructed to call 911 and go to ER now, he verbalizes agreement and understanding.         Adult Patient Profile  Questions/Answers    Flowsheet Row Most Recent Value   Symptoms/Conditions Managed at Home gastrointestinal, genitourinary, hematologic   Barriers to Managing Health literacy, lack of transportation   Gastrointestinal Symptoms/Conditions abdominal pain, distension   Gastrointestinal Self-Management Outcome 2 (bad)   Gastrointestinal Comment reports \"my belly is swelling bad and you can see my veins in my belly and I gained 27 pounds since last week\" instructed to go to ER now   Genitourinary Symptoms/Conditions bloody urine  [reports \"bleeding from my manhood\"]   Genitourinary Self-Management Outcome 2 (bad)   Hematologic Symptoms/Conditions other (see comments)  [cirrhosis of liver]   Hematologic Self-Management Outcome 2 (bad)   Hematologic Comment reports 27pound weight gain in a week and swollen abdomen              CHAPIS H  Ambulatory Case Management    1/11/2023, 14:19 EST  "

## 2023-01-12 ENCOUNTER — TELEPHONE (OUTPATIENT)
Dept: CASE MANAGEMENT | Facility: OTHER | Age: 58
End: 2023-01-12
Payer: COMMERCIAL

## 2023-01-12 VITALS
RESPIRATION RATE: 18 BRPM | WEIGHT: 255.07 LBS | HEIGHT: 68 IN | DIASTOLIC BLOOD PRESSURE: 76 MMHG | OXYGEN SATURATION: 95 % | BODY MASS INDEX: 38.66 KG/M2 | HEART RATE: 94 BPM | TEMPERATURE: 98.1 F | SYSTOLIC BLOOD PRESSURE: 146 MMHG

## 2023-01-12 LAB — QT INTERVAL: 364 MS

## 2023-01-12 NOTE — ED NOTES
Pt asking for dilaudid, states it what helps him the most for his chronic back pain. Pt states he takes pain medications at home.

## 2023-01-12 NOTE — TELEPHONE ENCOUNTER
"Noted per chart review patient went to ED and was diagnosed with back pain and given IVF x 2, Dilaudid IV, Zofran,  and Insulin his glucose was 622 but he told them he was not able to take insulin due back pain. No mention of abdominal distention or \"veins in belly showing\" but he did say he had blood from penis. He was discharged with Buspar 7.5 mg TID. He is to see PCP in 2 days, he has follow up for 1/20/23. Diagnosed with DMT2 with hyperglycemia, chronic low back pain.   "

## 2023-01-16 LAB
BACTERIA SPEC AEROBE CULT: NORMAL
BACTERIA SPEC AEROBE CULT: NORMAL

## 2023-01-20 ENCOUNTER — OFFICE VISIT (OUTPATIENT)
Dept: FAMILY MEDICINE CLINIC | Facility: CLINIC | Age: 58
End: 2023-01-20
Payer: COMMERCIAL

## 2023-01-20 VITALS
BODY MASS INDEX: 39.27 KG/M2 | HEIGHT: 68 IN | HEART RATE: 88 BPM | OXYGEN SATURATION: 98 % | WEIGHT: 259.1 LBS | DIASTOLIC BLOOD PRESSURE: 63 MMHG | TEMPERATURE: 97.5 F | SYSTOLIC BLOOD PRESSURE: 152 MMHG | RESPIRATION RATE: 18 BRPM

## 2023-01-20 DIAGNOSIS — E66.09 CLASS 2 OBESITY DUE TO EXCESS CALORIES WITHOUT SERIOUS COMORBIDITY WITH BODY MASS INDEX (BMI) OF 39.0 TO 39.9 IN ADULT: Chronic | ICD-10-CM

## 2023-01-20 DIAGNOSIS — I63.9 CEREBROVASCULAR ACCIDENT (CVA), UNSPECIFIED MECHANISM: ICD-10-CM

## 2023-01-20 DIAGNOSIS — J30.89 NON-SEASONAL ALLERGIC RHINITIS DUE TO OTHER ALLERGIC TRIGGER: ICD-10-CM

## 2023-01-20 DIAGNOSIS — R18.8 CIRRHOSIS OF LIVER WITH ASCITES, UNSPECIFIED HEPATIC CIRRHOSIS TYPE: ICD-10-CM

## 2023-01-20 DIAGNOSIS — K74.60 CIRRHOSIS OF LIVER WITH ASCITES, UNSPECIFIED HEPATIC CIRRHOSIS TYPE: ICD-10-CM

## 2023-01-20 DIAGNOSIS — K27.4 GASTROINTESTINAL HEMORRHAGE ASSOCIATED WITH PEPTIC ULCER: ICD-10-CM

## 2023-01-20 DIAGNOSIS — I10 PRIMARY HYPERTENSION: Chronic | ICD-10-CM

## 2023-01-20 DIAGNOSIS — E11.65 TYPE 2 DIABETES MELLITUS WITH HYPERGLYCEMIA, WITHOUT LONG-TERM CURRENT USE OF INSULIN: Primary | Chronic | ICD-10-CM

## 2023-01-20 PROBLEM — E66.812 CLASS 2 OBESITY DUE TO EXCESS CALORIES WITHOUT SERIOUS COMORBIDITY WITH BODY MASS INDEX (BMI) OF 36.0 TO 36.9 IN ADULT: Chronic | Status: RESOLVED | Noted: 2022-09-08 | Resolved: 2023-01-20

## 2023-01-20 PROCEDURE — 99214 OFFICE O/P EST MOD 30 MIN: CPT | Performed by: FAMILY MEDICINE

## 2023-01-20 RX ORDER — ROPINIROLE 1 MG/1
1 TABLET, FILM COATED ORAL NIGHTLY
Qty: 90 TABLET | Refills: 1 | Status: SHIPPED | OUTPATIENT
Start: 2023-01-20

## 2023-01-20 RX ORDER — BUSPIRONE HYDROCHLORIDE 7.5 MG/1
7.5 TABLET ORAL 3 TIMES DAILY
Qty: 90 TABLET | Refills: 5 | Status: SHIPPED | OUTPATIENT
Start: 2023-01-20

## 2023-01-20 RX ORDER — LACTULOSE 10 G/15ML
45 SOLUTION ORAL; RECTAL 2 TIMES DAILY
Qty: 1892 ML | Refills: 1
Start: 2023-01-20

## 2023-01-20 RX ORDER — FUROSEMIDE 40 MG/1
40 TABLET ORAL DAILY
Qty: 180 TABLET | Refills: 3
Start: 2023-01-20

## 2023-01-20 RX ORDER — SERTRALINE HYDROCHLORIDE 25 MG/1
25 TABLET, FILM COATED ORAL NIGHTLY
Qty: 90 TABLET | Refills: 1 | Status: SHIPPED | OUTPATIENT
Start: 2023-01-20 | End: 2023-01-20 | Stop reason: DRUGHIGH

## 2023-01-20 RX ORDER — FLUTICASONE PROPIONATE 50 MCG
2 SPRAY, SUSPENSION (ML) NASAL DAILY
Qty: 18.2 ML | Refills: 11 | Status: SHIPPED | OUTPATIENT
Start: 2023-01-20

## 2023-01-20 RX ORDER — MAGNESIUM OXIDE 400 MG/1
400 TABLET ORAL DAILY
Qty: 90 TABLET | Refills: 1 | Status: SHIPPED | OUTPATIENT
Start: 2023-01-20

## 2023-01-20 RX ORDER — OMEPRAZOLE 40 MG/1
40 CAPSULE, DELAYED RELEASE ORAL 2 TIMES DAILY
Qty: 180 CAPSULE | Refills: 3 | Status: SHIPPED | OUTPATIENT
Start: 2023-01-20

## 2023-01-20 RX ORDER — SPIRONOLACTONE 100 MG/1
100 TABLET, FILM COATED ORAL 2 TIMES DAILY
Qty: 60 TABLET | Refills: 3 | Status: SHIPPED | OUTPATIENT
Start: 2023-01-20

## 2023-01-20 RX ORDER — ALBUTEROL SULFATE 90 UG/1
2 AEROSOL, METERED RESPIRATORY (INHALATION) EVERY 4 HOURS PRN
Qty: 18 G | Refills: 11 | Status: SHIPPED | OUTPATIENT
Start: 2023-01-20

## 2023-01-20 RX ORDER — CETIRIZINE HYDROCHLORIDE 10 MG/1
10 TABLET ORAL DAILY
Qty: 90 TABLET | Refills: 3 | Status: SHIPPED | OUTPATIENT
Start: 2023-01-20

## 2023-01-20 RX ORDER — ATORVASTATIN CALCIUM 40 MG/1
40 TABLET, FILM COATED ORAL DAILY
Qty: 90 TABLET | Refills: 1 | Status: SHIPPED | OUTPATIENT
Start: 2023-01-20

## 2023-01-20 RX ORDER — ERGOCALCIFEROL 1.25 MG/1
50000 CAPSULE ORAL
Qty: 12 CAPSULE | Refills: 1 | Status: SHIPPED | OUTPATIENT
Start: 2023-01-20

## 2023-01-20 RX ORDER — POTASSIUM CHLORIDE 20 MEQ/1
20 TABLET, EXTENDED RELEASE ORAL DAILY
Qty: 90 TABLET | Refills: 1 | Status: SHIPPED | OUTPATIENT
Start: 2023-01-20

## 2023-01-20 NOTE — ASSESSMENT & PLAN NOTE
Patient's (Body mass index is 39.41 kg/m².) indicates that they are morbidly/severely obese (BMI > 40 or > 35 with obesity - related health condition) with health conditions that include hypertension, diabetes mellitus and dyslipidemias . Weight is worsening. BMI is is above average; BMI management plan is completed. We discussed low calorie, low carb based diet program, portion control and increasing exercise.

## 2023-01-20 NOTE — PROGRESS NOTES
"Chief Complaint  Back Pain and Hernia (Painful )    Subjective        Nic Nicolas presents to [facility]  History of Present Illness  He is here today for management of his chronic medical conditions.  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     He does not check his blood sugar on a daily basis.      The patient is continuing to have chronic pain in his various joints.  He has been seen by pain management and is awaiting further treatment once his recent A1c's have been reviewed.     His blood sugar is improving. It is running around 200 in am. He is taking 35 units of insulin bid.      The patient has no other complaints today and denies chest pain, shortness of breath, weakness, numbness, nausea, vomiting, diarrhea, dizziness or syncopal event.      Objective   Vital Signs:  /63 (BP Location: Left arm, Patient Position: Sitting)   Pulse 88   Temp 97.5 °F (36.4 °C)   Resp 18   Ht 172.7 cm (67.99\")   Wt 118 kg (259 lb 1.6 oz)   SpO2 98%   BMI 39.41 kg/m²   Estimated body mass index is 39.41 kg/m² as calculated from the following:    Height as of this encounter: 172.7 cm (67.99\").    Weight as of this encounter: 118 kg (259 lb 1.6 oz).             Physical Exam  Vitals reviewed.   Constitutional:       Appearance: Normal appearance. He is well-developed. He is morbidly obese.   HENT:      Head: Normocephalic and atraumatic.      Right Ear: External ear normal.      Left Ear: External ear normal.      Mouth/Throat:      Pharynx: No oropharyngeal exudate.   Eyes:      Conjunctiva/sclera: Conjunctivae normal.      Pupils: Pupils are equal, round, and reactive to light.   Neck:      Vascular: No carotid bruit. "   Cardiovascular:      Rate and Rhythm: Normal rate and regular rhythm.      Heart sounds: No murmur heard.    No friction rub. No gallop.   Pulmonary:      Effort: Pulmonary effort is normal.      Breath sounds: Normal breath sounds. No wheezing or rhonchi.   Abdominal:      General: There is no distension.   Skin:     General: Skin is warm and dry.   Neurological:      Mental Status: He is alert and oriented to person, place, and time.      Cranial Nerves: No cranial nerve deficit.      Motor: No weakness.   Psychiatric:         Mood and Affect: Mood and affect normal.         Behavior: Behavior normal.         Thought Content: Thought content normal.         Judgment: Judgment normal.        Result Review :    CMP    CMP 12/21/22 12/22/22 1/11/23 1/11/23      1538 1752   Glucose 143 (A) 145 (A) 622 (A) 517 (A)   BUN 31 (A) 32 (A) 11 11   Creatinine 1.44 (A) 1.48 (A) 1.13 1.09   eGFR 57.0 (A) 55.2 (A) 76.3 79.7   Sodium 140 138 130 (A) 131 (A)   Potassium 4.2 4.0 3.9 4.1   Chloride 105 103 99 100   Calcium 9.6 9.6 8.7 8.4 (A)   Total Protein  5.9 (A) 6.4    Albumin  3.30 (A) 3.3 (A)    Globulin  2.6 3.1    Total Bilirubin  1.4 (A) 1.5 (A)    Alkaline Phosphatase  74 128 (A)    AST (SGOT)  45 (A) 31    ALT (SGPT)  19 25    Albumin/Globulin Ratio  1.3 1.1    BUN/Creatinine Ratio 21.5 21.6 9.7 10.1   Anion Gap 11.4 11.8 11.3 8.1   (A) Abnormal value       Comments are available for some flowsheets but are not being displayed.           CBC    CBC 12/21/22 12/22/22 1/11/23   WBC 4.12 3.44 3.71   RBC 3.26 (A) 3.14 (A) 2.99 (A)   Hemoglobin 9.4 (A) 9.0 (A) 8.5 (A)   Hematocrit 28.1 (A) 26.7 (A) 25.8 (A)   MCV 86.2 85.0 86.3   MCH 28.8 28.7 28.4   MCHC 33.5 33.7 32.9   RDW 15.3 15.3 15.5 (A)   Platelets 77 (A) 63 (A) 72 (A)   (A) Abnormal value            Lipid Panel    Lipid Panel 4/12/22 7/8/22   Total Cholesterol 110 117   Triglycerides 69 31   HDL Cholesterol 63 (A) 59   VLDL Cholesterol 15 9   LDL Cholesterol  32 49    LDL/HDL Ratio 0.53 0.88   (A) Abnormal value            TSH    TSH 6/20/22 7/8/22 8/2/22   TSH 6.840 (A) 4.450 (A) 3.170   (A) Abnormal value                         Assessment and Plan   Diagnoses and all orders for this visit:    1. Type 2 diabetes mellitus with hyperglycemia, without long-term current use of insulin (HCC) (Primary)  Assessment & Plan:  Diabetes is improving with treatment.   Medication changes per orders. He was encouraged to increase his insulin 2 units every 2 days blood sugar is above 120 fasting in the am.   Diabetes will be reassessed in 3 months.      2. Cerebrovascular accident (CVA), unspecified mechanism (HCC)  Comments:  cont statin. not on antiplatelet with h/o GI bleeds.   Orders:  -     atorvastatin (LIPITOR) 40 MG tablet; Take 1 tablet by mouth Daily.  Dispense: 90 tablet; Refill: 1    3. Non-seasonal allergic rhinitis due to other allergic trigger  -     cetirizine (zyrTEC) 10 MG tablet; Take 1 tablet by mouth Daily.  Dispense: 90 tablet; Refill: 3    4. Cirrhosis of liver with ascites, unspecified hepatic cirrhosis type (HCC)  Comments:  prior history. cont diuretics, rifaximin, and lactulose. check ammonia level. close f/u with GI.   Orders:  -     furosemide (LASIX) 40 MG tablet; Take 1 tablet by mouth Daily.  Dispense: 180 tablet; Refill: 3  -     potassium chloride (K-DUR,KLOR-CON) 20 MEQ CR tablet; Take 1 tablet by mouth Daily.  Dispense: 90 tablet; Refill: 1  -     riFAXIMin (XIFAXAN) 550 MG tablet; Take 1 tablet by mouth Every 12 (Twelve) Hours. Indications: Impaired Brain Function due to Liver Disease  Dispense: 180 tablet; Refill: 3    5. Gastrointestinal hemorrhage associated with peptic ulcer  Comments:  concern for recurrent GI bleed given dec in Hgb on POC and episodes of melena.   Orders:  -     omeprazole (priLOSEC) 40 MG capsule; Take 1 capsule by mouth 2 (Two) Times a Day.  Dispense: 180 capsule; Refill: 3    6. Class 2 obesity due to excess calories without  serious comorbidity with body mass index (BMI) of 39.0 to 39.9 in adult  Assessment & Plan:  Patient's (Body mass index is 39.41 kg/m².) indicates that they are morbidly/severely obese (BMI > 40 or > 35 with obesity - related health condition) with health conditions that include hypertension, diabetes mellitus and dyslipidemias . Weight is worsening. BMI is is above average; BMI management plan is completed. We discussed low calorie, low carb based diet program, portion control and increasing exercise.       7. Primary hypertension  Assessment & Plan:  Hypertension is improving with treatment.  Continue current treatment regimen.  Dietary sodium restriction.  Weight loss.  Blood pressure will be reassessed at the next regular appointment.      Other orders  -     busPIRone (BUSPAR) 7.5 MG tablet; Take 1 tablet by mouth 3 (Three) Times a Day.  Dispense: 90 tablet; Refill: 5  -     magnesium oxide (MAG-OX) 400 MG tablet; Take 1 tablet by mouth Daily.  Dispense: 90 tablet; Refill: 1  -     rOPINIRole (REQUIP) 1 MG tablet; Take 1 tablet by mouth Every Night. take 1 hour before bedtime  Dispense: 90 tablet; Refill: 1  -     Discontinue: sertraline (ZOLOFT) 25 MG tablet; Take 1 tablet by mouth Every Night.  Dispense: 90 tablet; Refill: 1  -     sertraline (ZOLOFT) 50 MG tablet; Take 1 tablet by mouth Every Night.  Dispense: 90 tablet; Refill: 1  -     spironolactone (Aldactone) 100 MG tablet; Take 1 tablet by mouth 2 (Two) Times a Day.  Dispense: 60 tablet; Refill: 3  -     vitamin D (ERGOCALCIFEROL) 1.25 MG (86135 UT) capsule capsule; Take 1 capsule by mouth Every 7 (Seven) Days.  Dispense: 12 capsule; Refill: 1  -     albuterol sulfate  (90 Base) MCG/ACT inhaler; Inhale 2 puffs Every 4 (Four) Hours As Needed for Wheezing.  Dispense: 18 g; Refill: 11  -     fluticasone (Flonase) 50 MCG/ACT nasal spray; 2 sprays into the nostril(s) as directed by provider Daily.  Dispense: 18.2 mL; Refill: 11  -     lactulose  (Generlac) 10 GM/15ML solution solution (encephalopathy); Take 68 mL by mouth 2 (Two) Times a Day.  Dispense: 1892 mL; Refill: 1           Follow Up   No follow-ups on file.  Patient was given instructions and counseling regarding his condition or for health maintenance advice. Please see specific information pulled into the AVS if appropriate.

## 2023-01-20 NOTE — ASSESSMENT & PLAN NOTE
Diabetes is improving with treatment.   Medication changes per orders. He was encouraged to increase his insulin 2 units every 2 days blood sugar is above 120 fasting in the am.   Diabetes will be reassessed in 3 months.

## 2023-01-26 ENCOUNTER — TELEPHONE (OUTPATIENT)
Dept: CASE MANAGEMENT | Facility: OTHER | Age: 58
End: 2023-01-26
Payer: COMMERCIAL

## 2023-01-26 NOTE — TELEPHONE ENCOUNTER
Chart review, he no show for his follow up appointment with PCP today. Unable to reach on his phone.

## 2023-01-30 ENCOUNTER — PATIENT OUTREACH (OUTPATIENT)
Dept: CASE MANAGEMENT | Facility: OTHER | Age: 58
End: 2023-01-30
Payer: COMMERCIAL

## 2023-01-30 DIAGNOSIS — K76.82 HEPATIC ENCEPHALOPATHY: ICD-10-CM

## 2023-01-30 DIAGNOSIS — I63.9 CEREBROVASCULAR ACCIDENT (CVA), UNSPECIFIED MECHANISM: ICD-10-CM

## 2023-01-30 DIAGNOSIS — K74.60 CIRRHOSIS OF LIVER WITH ASCITES, UNSPECIFIED HEPATIC CIRRHOSIS TYPE: Primary | ICD-10-CM

## 2023-01-30 DIAGNOSIS — R63.5 WEIGHT GAIN: ICD-10-CM

## 2023-01-30 DIAGNOSIS — R73.9 ELEVATED BLOOD SUGAR: ICD-10-CM

## 2023-01-30 DIAGNOSIS — R18.8 CIRRHOSIS OF LIVER WITH ASCITES, UNSPECIFIED HEPATIC CIRRHOSIS TYPE: Primary | ICD-10-CM

## 2023-01-30 NOTE — OUTREACH NOTE
[facility] End of Month Documentation    This Chronic Medical Management Care Plan for Nic Nicolas, 57 y.o. male, has been monitored and managed; reviewed and a new plan of care implemented for the month of January.  A cumulative time of 23  minutes was spent on this patient record this month, including phone call with patient; chart review.    Regarding the patient's problems: has Arthritis, senescent; Colon polyps; Liver cirrhosis secondary to ROMO (nonalcoholic steatohepatitis) (HCC); Multiple episodes of deep venous thrombosis (HCC); High blood pressure; Hyperlipidemia; Other specified anemias; Sleep apnea; Systolic congestive heart failure (HCC); Bilateral lower extremity edema; Chronic cystitis; Chronic low back pain; Type 2 diabetes mellitus with hyperglycemia (HCC); Acid reflux; Acetabular fracture (HCC); Gross hematuria; Hesitancy of micturition; Gastrointestinal hemorrhage associated with peptic ulcer; Anxiety; Hepatic encephalopathy; Stroke (HCC); Amphetamine abuse (HCC); Class 2 obesity due to excess calories without serious comorbidity with body mass index (BMI) of 39.0 to 39.9 in adult; and Hyperammonemia (HCC) on their problem list., the following items were addressed: medical records and any changes can be found within the plan section of the note.  A detailed listing of time spent for chronic care management is tracked within each outreach encounter.  Current medications include:  has a current medication list which includes the following prescription(s): albuterol sulfate hfa, atorvastatin, buspirone, cetirizine, fluticasone, fluticasone, furosemide, humalog kwikpen, hydrocodone-acetaminophen, insulin detemir, lactulose, magnesium oxide, omeprazole, ondansetron odt, polyethyl glycol-propyl glycol, potassium chloride, rifaximin, ropinirole, sertraline, spironolactone, vitamin d, and [DISCONTINUED] fluticasone. and the patient is reported to be patient is compliant with medication protocol, reports  compliance,  Medications are reported to be non-effective in controlling symptoms and changes have been made to the medication protocol, ER visit this month.  Regarding these diagnoses, referrals were made to the following provider(s):  Er visit and PCP follow up appointments.  All notes on chart for PCP to review.    The patient was monitored remotely for pain; blood glucose; activity level.    The patient's physical needs include:  needs met.     The patient's mental support needs include:  continued support    The patient's cognitive support needs include:  needs met    The patient's psychosocial support needs include:  continued support    The patient's functional needs include: needs met    The patient's environmental needs include:  not applicable, none    Care Plan overall comments:  reviewed    Refer to previous outreach notes for more information on the areas listed above.    Monthly Billing Diagnoses  (K74.60,  R18.8) Cirrhosis of liver with ascites, unspecified hepatic cirrhosis type (HCC)    (K76.82) Hepatic encephalopathy    (R63.5) Weight gain    (R73.9) Elevated blood sugar    (I63.9) Cerebrovascular accident (CVA), unspecified mechanism (HCC)    Medications   · Medications have been reconciled    Care Plan progress this month:      Recently Modified Care Plans Updates made since 12/30/2022 12:00 AM    No recently modified care plans.          · Current Specialty Plan of Care Status signed by both patient and provider    Instructions   · Patient was provided an electronic copy of care plan  · CCM services were explained and offered and patient has accepted these services.  · Patient has given their written consent to receive CCM services and understands that this includes the authorization of electronic communication of medical information with the other treating providers.  · Patient understands that they may stop CCM services at any time and these changes will be effective at the end of the calendar  month and will effectively revocate the agreement of CCM services.  · Patient understands that only one practitioner can furnish and be paid for CCM services during one calendar month.  Patient also understands that there may be co-payment or deductible fees in association with CCM services.  · Patient will continue with at least monthly follow-up calls with the Ambulatory .    CHAPIS INCOLE  Ambulatory Case Management    1/30/2023, 14:21 EST

## 2023-02-05 ENCOUNTER — HOSPITAL ENCOUNTER (EMERGENCY)
Facility: HOSPITAL | Age: 58
Discharge: LEFT BEFORE DISCHARGE | End: 2023-02-05
Attending: EMERGENCY MEDICINE | Admitting: EMERGENCY MEDICINE
Payer: COMMERCIAL

## 2023-02-05 ENCOUNTER — APPOINTMENT (OUTPATIENT)
Dept: CT IMAGING | Facility: HOSPITAL | Age: 58
End: 2023-02-05
Payer: COMMERCIAL

## 2023-02-05 VITALS
TEMPERATURE: 98.6 F | HEIGHT: 68 IN | DIASTOLIC BLOOD PRESSURE: 57 MMHG | BODY MASS INDEX: 39.96 KG/M2 | RESPIRATION RATE: 18 BRPM | WEIGHT: 263.67 LBS | OXYGEN SATURATION: 94 % | HEART RATE: 78 BPM | SYSTOLIC BLOOD PRESSURE: 115 MMHG

## 2023-02-05 DIAGNOSIS — K74.60 CIRRHOSIS OF LIVER WITH ASCITES, UNSPECIFIED HEPATIC CIRRHOSIS TYPE: ICD-10-CM

## 2023-02-05 DIAGNOSIS — R18.8 CIRRHOSIS OF LIVER WITH ASCITES, UNSPECIFIED HEPATIC CIRRHOSIS TYPE: ICD-10-CM

## 2023-02-05 DIAGNOSIS — R10.84 GENERALIZED ABDOMINAL PAIN: Primary | ICD-10-CM

## 2023-02-05 LAB
ALBUMIN SERPL-MCNC: 3 G/DL (ref 3.5–5.2)
ALBUMIN/GLOB SERPL: 1 G/DL
ALP SERPL-CCNC: 102 U/L (ref 39–117)
ALT SERPL W P-5'-P-CCNC: 18 U/L (ref 1–41)
ANION GAP SERPL CALCULATED.3IONS-SCNC: 7.4 MMOL/L (ref 5–15)
AST SERPL-CCNC: 37 U/L (ref 1–40)
BASOPHILS # BLD AUTO: 0.03 10*3/MM3 (ref 0–0.2)
BASOPHILS NFR BLD AUTO: 1 % (ref 0–1.5)
BILIRUB SERPL-MCNC: 1.1 MG/DL (ref 0–1.2)
BILIRUB UR QL STRIP: NEGATIVE
BUN SERPL-MCNC: 18 MG/DL (ref 6–20)
BUN/CREAT SERPL: 18.2 (ref 7–25)
CALCIUM SPEC-SCNC: 8.9 MG/DL (ref 8.6–10.5)
CHLORIDE SERPL-SCNC: 106 MMOL/L (ref 98–107)
CLARITY UR: CLEAR
CO2 SERPL-SCNC: 25.6 MMOL/L (ref 22–29)
COLOR UR: YELLOW
CREAT SERPL-MCNC: 0.99 MG/DL (ref 0.76–1.27)
DEPRECATED RDW RBC AUTO: 53.3 FL (ref 37–54)
EGFRCR SERPLBLD CKD-EPI 2021: 89.4 ML/MIN/1.73
EOSINOPHIL # BLD AUTO: 0.16 10*3/MM3 (ref 0–0.4)
EOSINOPHIL NFR BLD AUTO: 5.2 % (ref 0.3–6.2)
ERYTHROCYTE [DISTWIDTH] IN BLOOD BY AUTOMATED COUNT: 17.2 % (ref 12.3–15.4)
GLOBULIN UR ELPH-MCNC: 2.9 GM/DL
GLUCOSE SERPL-MCNC: 257 MG/DL (ref 65–99)
GLUCOSE UR STRIP-MCNC: NEGATIVE MG/DL
HCT VFR BLD AUTO: 25.9 % (ref 37.5–51)
HGB BLD-MCNC: 8.3 G/DL (ref 13–17.7)
HGB UR QL STRIP.AUTO: NEGATIVE
HOLD SPECIMEN: NORMAL
HOLD SPECIMEN: NORMAL
IMM GRANULOCYTES # BLD AUTO: 0.01 10*3/MM3 (ref 0–0.05)
IMM GRANULOCYTES NFR BLD AUTO: 0.3 % (ref 0–0.5)
KETONES UR QL STRIP: NEGATIVE
LEUKOCYTE ESTERASE UR QL STRIP.AUTO: NEGATIVE
LIPASE SERPL-CCNC: 38 U/L (ref 13–60)
LYMPHOCYTES # BLD AUTO: 0.68 10*3/MM3 (ref 0.7–3.1)
LYMPHOCYTES NFR BLD AUTO: 22.1 % (ref 19.6–45.3)
MCH RBC QN AUTO: 27.9 PG (ref 26.6–33)
MCHC RBC AUTO-ENTMCNC: 32 G/DL (ref 31.5–35.7)
MCV RBC AUTO: 86.9 FL (ref 79–97)
MONOCYTES # BLD AUTO: 0.32 10*3/MM3 (ref 0.1–0.9)
MONOCYTES NFR BLD AUTO: 10.4 % (ref 5–12)
NEUTROPHILS NFR BLD AUTO: 1.88 10*3/MM3 (ref 1.7–7)
NEUTROPHILS NFR BLD AUTO: 61 % (ref 42.7–76)
NITRITE UR QL STRIP: NEGATIVE
NRBC BLD AUTO-RTO: 0 /100 WBC (ref 0–0.2)
PH UR STRIP.AUTO: <=5 [PH] (ref 5–8)
PLATELET # BLD AUTO: 83 10*3/MM3 (ref 140–450)
PMV BLD AUTO: 11.4 FL (ref 6–12)
POTASSIUM SERPL-SCNC: 5.3 MMOL/L (ref 3.5–5.2)
PROT SERPL-MCNC: 5.9 G/DL (ref 6–8.5)
PROT UR QL STRIP: NEGATIVE
RBC # BLD AUTO: 2.98 10*6/MM3 (ref 4.14–5.8)
SODIUM SERPL-SCNC: 139 MMOL/L (ref 136–145)
SP GR UR STRIP: >1.03 (ref 1–1.03)
UROBILINOGEN UR QL STRIP: ABNORMAL
WBC NRBC COR # BLD: 3.08 10*3/MM3 (ref 3.4–10.8)
WHOLE BLOOD HOLD COAG: NORMAL
WHOLE BLOOD HOLD SPECIMEN: NORMAL

## 2023-02-05 PROCEDURE — 25010000002 KETOROLAC TROMETHAMINE PER 15 MG

## 2023-02-05 PROCEDURE — 85025 COMPLETE CBC W/AUTO DIFF WBC: CPT

## 2023-02-05 PROCEDURE — 74177 CT ABD & PELVIS W/CONTRAST: CPT

## 2023-02-05 PROCEDURE — 80053 COMPREHEN METABOLIC PANEL: CPT

## 2023-02-05 PROCEDURE — 0 IOPAMIDOL PER 1 ML: Performed by: EMERGENCY MEDICINE

## 2023-02-05 PROCEDURE — 99283 EMERGENCY DEPT VISIT LOW MDM: CPT

## 2023-02-05 PROCEDURE — 83690 ASSAY OF LIPASE: CPT

## 2023-02-05 PROCEDURE — 96374 THER/PROPH/DIAG INJ IV PUSH: CPT

## 2023-02-05 PROCEDURE — 81003 URINALYSIS AUTO W/O SCOPE: CPT

## 2023-02-05 RX ORDER — SODIUM CHLORIDE 0.9 % (FLUSH) 0.9 %
10 SYRINGE (ML) INJECTION AS NEEDED
Status: DISCONTINUED | OUTPATIENT
Start: 2023-02-05 | End: 2023-02-05 | Stop reason: HOSPADM

## 2023-02-05 RX ORDER — KETOROLAC TROMETHAMINE 30 MG/ML
30 INJECTION, SOLUTION INTRAMUSCULAR; INTRAVENOUS ONCE
Status: COMPLETED | OUTPATIENT
Start: 2023-02-05 | End: 2023-02-05

## 2023-02-05 RX ADMIN — SODIUM CHLORIDE, POTASSIUM CHLORIDE, SODIUM LACTATE AND CALCIUM CHLORIDE 1000 ML: 600; 310; 30; 20 INJECTION, SOLUTION INTRAVENOUS at 16:04

## 2023-02-05 RX ADMIN — IOPAMIDOL 100 ML: 755 INJECTION, SOLUTION INTRAVENOUS at 17:30

## 2023-02-05 RX ADMIN — KETOROLAC TROMETHAMINE 30 MG: 30 INJECTION, SOLUTION INTRAMUSCULAR; INTRAVENOUS at 15:33
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
## 2023-02-10 ENCOUNTER — OFFICE VISIT (OUTPATIENT)
Dept: FAMILY MEDICINE CLINIC | Facility: CLINIC | Age: 58
End: 2023-02-10
Payer: COMMERCIAL

## 2023-02-10 VITALS
OXYGEN SATURATION: 92 % | BODY MASS INDEX: 41.66 KG/M2 | WEIGHT: 274.9 LBS | HEART RATE: 64 BPM | TEMPERATURE: 97.2 F | DIASTOLIC BLOOD PRESSURE: 58 MMHG | SYSTOLIC BLOOD PRESSURE: 142 MMHG | HEIGHT: 68 IN | RESPIRATION RATE: 18 BRPM

## 2023-02-10 DIAGNOSIS — R41.82 ALTERED MENTAL STATUS, UNSPECIFIED ALTERED MENTAL STATUS TYPE: Primary | ICD-10-CM

## 2023-02-10 DIAGNOSIS — F33.1 MODERATE EPISODE OF RECURRENT MAJOR DEPRESSIVE DISORDER: Chronic | ICD-10-CM

## 2023-02-10 DIAGNOSIS — E66.01 CLASS 3 SEVERE OBESITY DUE TO EXCESS CALORIES WITH SERIOUS COMORBIDITY AND BODY MASS INDEX (BMI) OF 40.0 TO 44.9 IN ADULT: Chronic | ICD-10-CM

## 2023-02-10 DIAGNOSIS — R82.90 FOUL SMELLING URINE: ICD-10-CM

## 2023-02-10 DIAGNOSIS — F51.01 PRIMARY INSOMNIA: ICD-10-CM

## 2023-02-10 DIAGNOSIS — E11.65 TYPE 2 DIABETES MELLITUS WITH HYPERGLYCEMIA, WITHOUT LONG-TERM CURRENT USE OF INSULIN: Chronic | ICD-10-CM

## 2023-02-10 DIAGNOSIS — K74.60 LIVER CIRRHOSIS SECONDARY TO NASH (NONALCOHOLIC STEATOHEPATITIS): Chronic | ICD-10-CM

## 2023-02-10 DIAGNOSIS — K75.81 LIVER CIRRHOSIS SECONDARY TO NASH (NONALCOHOLIC STEATOHEPATITIS): Chronic | ICD-10-CM

## 2023-02-10 DIAGNOSIS — R82.998 DARK URINE: ICD-10-CM

## 2023-02-10 PROBLEM — E66.813 CLASS 3 SEVERE OBESITY DUE TO EXCESS CALORIES WITH SERIOUS COMORBIDITY AND BODY MASS INDEX (BMI) OF 40.0 TO 44.9 IN ADULT: Chronic | Status: ACTIVE | Noted: 2022-09-08

## 2023-02-10 PROBLEM — E66.813 CLASS 3 SEVERE OBESITY DUE TO EXCESS CALORIES WITH SERIOUS COMORBIDITY AND BODY MASS INDEX (BMI) OF 40.0 TO 44.9 IN ADULT: Status: ACTIVE | Noted: 2022-09-08

## 2023-02-10 PROBLEM — E66.09 CLASS 2 OBESITY DUE TO EXCESS CALORIES WITHOUT SERIOUS COMORBIDITY WITH BODY MASS INDEX (BMI) OF 39.0 TO 39.9 IN ADULT: Chronic | Status: RESOLVED | Noted: 2022-09-08 | Resolved: 2023-02-10

## 2023-02-10 PROBLEM — E66.812 CLASS 2 OBESITY DUE TO EXCESS CALORIES WITHOUT SERIOUS COMORBIDITY WITH BODY MASS INDEX (BMI) OF 39.0 TO 39.9 IN ADULT: Chronic | Status: RESOLVED | Noted: 2022-09-08 | Resolved: 2023-02-10

## 2023-02-10 LAB
BILIRUB BLD-MCNC: NEGATIVE MG/DL
CLARITY, POC: CLEAR
COLOR UR: YELLOW
EXPIRATION DATE: ABNORMAL
GLUCOSE UR STRIP-MCNC: ABNORMAL MG/DL
KETONES UR QL: NEGATIVE
LEUKOCYTE EST, POC: NEGATIVE
Lab: ABNORMAL
NITRITE UR-MCNC: NEGATIVE MG/ML
PH UR: 6 [PH] (ref 5–8)
PROT UR STRIP-MCNC: NEGATIVE MG/DL
RBC # UR STRIP: ABNORMAL /UL
SP GR UR: 1.01 (ref 1–1.03)
UROBILINOGEN UR QL: NORMAL

## 2023-02-10 PROCEDURE — 87086 URINE CULTURE/COLONY COUNT: CPT | Performed by: FAMILY MEDICINE

## 2023-02-10 PROCEDURE — 99214 OFFICE O/P EST MOD 30 MIN: CPT | Performed by: FAMILY MEDICINE

## 2023-02-10 PROCEDURE — 81001 URINALYSIS AUTO W/SCOPE: CPT | Performed by: FAMILY MEDICINE

## 2023-02-10 RX ORDER — TRAZODONE HYDROCHLORIDE 50 MG/1
50 TABLET ORAL NIGHTLY
Qty: 30 TABLET | Refills: 5 | Status: SHIPPED | OUTPATIENT
Start: 2023-02-10

## 2023-02-10 RX ORDER — SERTRALINE HYDROCHLORIDE 100 MG/1
100 TABLET, FILM COATED ORAL NIGHTLY
Qty: 30 TABLET | Refills: 5 | Status: SHIPPED | OUTPATIENT
Start: 2023-02-10

## 2023-02-10 RX ORDER — LANOLIN ALCOHOL/MO/W.PET/CERES
CREAM (GRAM) TOPICAL
COMMUNITY
Start: 2023-01-20 | End: 2023-02-10

## 2023-02-10 RX ORDER — SERTRALINE HYDROCHLORIDE 25 MG/1
25 TABLET, FILM COATED ORAL NIGHTLY
COMMUNITY
Start: 2023-01-20 | End: 2023-02-10 | Stop reason: DRUGHIGH

## 2023-02-10 NOTE — PROGRESS NOTES
"Chief Complaint  Hospital follow up , Memory Loss (Memory issues ), Results (Ct results ), Blood in Urine, and Leg Pain (Bilateral leg pain and swelling and bleeding )    Subjective        Nic Nicolas presents to [facility]  History of Present Illness  He is here today for management of his chronic medical conditions and for an ER follow-up.  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     The patient is continuing to have chronic pain in his various joints.  He has been seen by pain management and is awaiting further treatment once his recent A1c's have been reviewed.     His blood sugar is improving. It is running around 200 in am. He is taking 35 units of insulin bid.      The patient has no other complaints today and denies chest pain, shortness of breath, weakness, numbness, nausea, vomiting, diarrhea, dizziness or syncopal event.      Objective   Vital Signs:  /58   Pulse 64   Temp 97.2 °F (36.2 °C)   Resp 18   Ht 172.7 cm (67.99\")   Wt 125 kg (274 lb 14.4 oz)   SpO2 92%   BMI 41.81 kg/m²   Estimated body mass index is 41.81 kg/m² as calculated from the following:    Height as of this encounter: 172.7 cm (67.99\").    Weight as of this encounter: 125 kg (274 lb 14.4 oz).             Physical Exam  Vitals reviewed.   Constitutional:       Appearance: Normal appearance. He is well-developed. He is morbidly obese.   HENT:      Head: Normocephalic and atraumatic.      Right Ear: External ear normal.      Left Ear: External ear normal.      Mouth/Throat:      Pharynx: No oropharyngeal exudate.   Eyes:      Conjunctiva/sclera: Conjunctivae normal.      Pupils: Pupils are equal, round, and reactive to light.   Neck:      " Vascular: No carotid bruit.   Cardiovascular:      Rate and Rhythm: Normal rate and regular rhythm.      Heart sounds: No murmur heard.    No friction rub. No gallop.   Pulmonary:      Effort: Pulmonary effort is normal.      Breath sounds: Normal breath sounds. No wheezing or rhonchi.   Abdominal:      General: There is no distension.   Skin:     General: Skin is warm and dry.   Neurological:      Mental Status: He is alert and oriented to person, place, and time.      Cranial Nerves: No cranial nerve deficit.      Motor: No weakness.   Psychiatric:         Mood and Affect: Mood and affect normal.         Behavior: Behavior normal.         Thought Content: Thought content normal.         Judgment: Judgment normal.        Result Review :    CMP    CMP 12/22/22 1/11/23 1/11/23 2/5/23     1538 1752    Glucose 145 (A) 622 (A) 517 (A) 257 (A)   BUN 32 (A) 11 11 18   Creatinine 1.48 (A) 1.13 1.09 0.99   eGFR 55.2 (A) 76.3 79.7 89.4   Sodium 138 130 (A) 131 (A) 139   Potassium 4.0 3.9 4.1 5.3 (A)   Chloride 103 99 100 106   Calcium 9.6 8.7 8.4 (A) 8.9   Total Protein 5.9 (A) 6.4  5.9 (A)   Albumin 3.30 (A) 3.3 (A)  3.0 (A)   Globulin 2.6 3.1  2.9   Total Bilirubin 1.4 (A) 1.5 (A)  1.1   Alkaline Phosphatase 74 128 (A)  102   AST (SGOT) 45 (A) 31  37   ALT (SGPT) 19 25  18   Albumin/Globulin Ratio 1.3 1.1  1.0   BUN/Creatinine Ratio 21.6 9.7 10.1 18.2   Anion Gap 11.8 11.3 8.1 7.4   (A) Abnormal value       Comments are available for some flowsheets but are not being displayed.           CBC    CBC 12/22/22 1/11/23 2/5/23   WBC 3.44 3.71 3.08 (A)   RBC 3.14 (A) 2.99 (A) 2.98 (A)   Hemoglobin 9.0 (A) 8.5 (A) 8.3 (A)   Hematocrit 26.7 (A) 25.8 (A) 25.9 (A)   MCV 85.0 86.3 86.9   MCH 28.7 28.4 27.9   MCHC 33.7 32.9 32.0   RDW 15.3 15.5 (A) 17.2 (A)   Platelets 63 (A) 72 (A) 83 (A)   (A) Abnormal value            Lipid Panel    Lipid Panel 4/12/22 7/8/22   Total Cholesterol 110 117   Triglycerides 69 31   HDL Cholesterol 63  (A) 59   VLDL Cholesterol 15 9   LDL Cholesterol  32 49   LDL/HDL Ratio 0.53 0.88   (A) Abnormal value            TSH    TSH 6/20/22 7/8/22 8/2/22   TSH 6.840 (A) 4.450 (A) 3.170   (A) Abnormal value                         Assessment and Plan   Diagnoses and all orders for this visit:    1. Altered mental status, unspecified altered mental status type (Primary)  -     MRI Brain Without Contrast; Future    2. Moderate episode of recurrent major depressive disorder (HCC)  Assessment & Plan:  Patient's depression is recurrent and is moderate without psychosis. Their depression is currently active and the condition is worsening. This will be reassessed in 3 months. F/U as described:patient will continue current medication therapy.    Orders:  -     sertraline (ZOLOFT) 100 MG tablet; Take 1 tablet by mouth Every Night.  Dispense: 30 tablet; Refill: 5    3. Dark urine  -     POCT urinalysis dipstick, automated  -     Urinalysis With Microscopic - Urine, Clean Catch; Future  -     Urine Culture - Urine, Urine, Clean Catch; Future  -     Urinalysis With Microscopic - Urine, Clean Catch  -     Urine Culture - Urine, Urine, Clean Catch    4. Foul smelling urine  -     POCT urinalysis dipstick, automated  -     Urinalysis With Microscopic - Urine, Clean Catch; Future  -     Urine Culture - Urine, Urine, Clean Catch; Future  -     Urinalysis With Microscopic - Urine, Clean Catch  -     Urine Culture - Urine, Urine, Clean Catch    5. Class 3 severe obesity due to excess calories with serious comorbidity and body mass index (BMI) of 40.0 to 44.9 in adult ([facility])  Assessment & Plan:  Patient's (Body mass index is 41.81 kg/m².) indicates that they are morbidly/severely obese (BMI > 40 or > 35 with obesity - related health condition) with health conditions that include hypertension, diabetes mellitus and dyslipidemias . Weight is worsening. BMI  is above average; BMI management plan is completed. We discussed low calorie, low carb  based diet program, portion control and increasing exercise.       6. Primary insomnia  -     traZODone (DESYREL) 50 MG tablet; Take 1 tablet by mouth Every Night.  Dispense: 30 tablet; Refill: 5    7. Type 2 diabetes mellitus with hyperglycemia, without long-term current use of insulin (HCC)  Assessment & Plan:  Diabetes is improving with treatment.   Continue current treatment regimen.  Dietary recommendations for ADA diet.  Diabetes will be reassessed in 6 months.    Orders:  -     Hemoglobin A1c; Future    8. Liver cirrhosis secondary to ROMO (nonalcoholic steatohepatitis) (HCC)  -     Ammonia; Future  -     Comprehensive metabolic panel; Future           Follow Up   No follow-ups on file.  Patient was given instructions and counseling regarding his condition or for health maintenance advice. Please see specific information pulled into the AVS if appropriate.

## 2023-02-11 LAB
BACTERIA UR QL AUTO: NORMAL /HPF
BILIRUB UR QL STRIP: NEGATIVE
CLARITY UR: CLEAR
COLOR UR: YELLOW
GLUCOSE UR STRIP-MCNC: ABNORMAL MG/DL
HGB UR QL STRIP.AUTO: NEGATIVE
HYALINE CASTS UR QL AUTO: NORMAL /LPF
KETONES UR QL STRIP: NEGATIVE
LEUKOCYTE ESTERASE UR QL STRIP.AUTO: NEGATIVE
NITRITE UR QL STRIP: NEGATIVE
PH UR STRIP.AUTO: 6.5 [PH] (ref 5–8)
PROT UR QL STRIP: NEGATIVE
RBC # UR STRIP: NORMAL /HPF
REF LAB TEST METHOD: NORMAL
SP GR UR STRIP: >=1.03 (ref 1–1.03)
SQUAMOUS #/AREA URNS HPF: NORMAL /HPF
UROBILINOGEN UR QL STRIP: ABNORMAL
WBC # UR STRIP: NORMAL /HPF

## 2023-02-11 NOTE — ASSESSMENT & PLAN NOTE
Patient's depression is recurrent and is moderate without psychosis. Their depression is currently active and the condition is worsening. This will be reassessed in 3 months. F/U as described:patient will continue current medication therapy.

## 2023-02-11 NOTE — ASSESSMENT & PLAN NOTE
Patient's (Body mass index is 41.81 kg/m².) indicates that they are morbidly/severely obese (BMI > 40 or > 35 with obesity - related health condition) with health conditions that include hypertension, diabetes mellitus and dyslipidemias . Weight is worsening. BMI  is above average; BMI management plan is completed. We discussed low calorie, low carb based diet program, portion control and increasing exercise.

## 2023-02-12 LAB — BACTERIA SPEC AEROBE CULT: NORMAL

## 2023-02-18 RX ORDER — LANCETS 28 GAUGE
EACH MISCELLANEOUS
Qty: 100 EACH | Refills: 3 | Status: SHIPPED | OUTPATIENT
Start: 2023-02-18

## 2023-02-18 RX ORDER — BLOOD-GLUCOSE METER
KIT MISCELLANEOUS
Qty: 100 EACH | Refills: 3 | Status: SHIPPED | OUTPATIENT
Start: 2023-02-18

## 2023-02-22 ENCOUNTER — PATIENT OUTREACH (OUTPATIENT)
Dept: CASE MANAGEMENT | Facility: OTHER | Age: 58
End: 2023-02-22
Payer: COMMERCIAL

## 2023-02-22 DIAGNOSIS — K76.82 HEPATIC ENCEPHALOPATHY: ICD-10-CM

## 2023-02-22 DIAGNOSIS — R18.8 CIRRHOSIS OF LIVER WITH ASCITES, UNSPECIFIED HEPATIC CIRRHOSIS TYPE: Primary | ICD-10-CM

## 2023-02-22 DIAGNOSIS — R73.9 ELEVATED BLOOD SUGAR: ICD-10-CM

## 2023-02-22 DIAGNOSIS — R63.5 WEIGHT GAIN: ICD-10-CM

## 2023-02-22 DIAGNOSIS — K74.60 CIRRHOSIS OF LIVER WITH ASCITES, UNSPECIFIED HEPATIC CIRRHOSIS TYPE: Primary | ICD-10-CM

## 2023-02-22 NOTE — OUTREACH NOTE
AMBULATORY CASE MANAGEMENT NOTE    Name and Relationship of Patient/Support Person: JEFFREYDG - Emergency Contact    CCM Interim Update    Spoke with his sister who reports as fr as she knows he is fine, unsure why her number is listed to call for him. He has a home phone.    Outreached to patient and no answer, left message for return call.     Chart reviewed and noted he was seen in ER this month and eloped when was told they would not give him pain medications. He saw PCP at ER f/u after this and per PCP he is waiting treatment with pain management after his A1c goes down some and he has orders for new labs. Will outreach again next month and close if unable to reach again.             Yazmin NICOLE  Ambulatory Case Management    2/22/2023, 14:50 EST
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
## 2023-03-03 ENCOUNTER — OFFICE VISIT (OUTPATIENT)
Dept: DIABETES SERVICES | Facility: HOSPITAL | Age: 58
End: 2023-03-03
Payer: COMMERCIAL

## 2023-03-03 VITALS
SYSTOLIC BLOOD PRESSURE: 152 MMHG | HEIGHT: 68 IN | BODY MASS INDEX: 39.36 KG/M2 | OXYGEN SATURATION: 99 % | HEART RATE: 102 BPM | TEMPERATURE: 98.2 F | DIASTOLIC BLOOD PRESSURE: 74 MMHG | WEIGHT: 259.7 LBS

## 2023-03-03 DIAGNOSIS — E66.01 SEVERE OBESITY (BMI 35.0-39.9) WITH COMORBIDITY: ICD-10-CM

## 2023-03-03 DIAGNOSIS — E11.65 UNCONTROLLED TYPE 2 DIABETES MELLITUS WITH HYPERGLYCEMIA: Primary | ICD-10-CM

## 2023-03-03 DIAGNOSIS — E11.40 TYPE 2 DIABETES MELLITUS WITH DIABETIC NEUROPATHY, WITH LONG-TERM CURRENT USE OF INSULIN: ICD-10-CM

## 2023-03-03 DIAGNOSIS — Z79.4 TYPE 2 DIABETES MELLITUS WITH DIABETIC NEUROPATHY, WITH LONG-TERM CURRENT USE OF INSULIN: ICD-10-CM

## 2023-03-03 LAB
EXPIRATION DATE: ABNORMAL
GLUCOSE BLDC GLUCOMTR-MCNC: 459 MG/DL (ref 70–99)
HBA1C MFR BLD: 7.8 %
Lab: ABNORMAL

## 2023-03-03 PROCEDURE — 3051F HG A1C>EQUAL 7.0%<8.0%: CPT | Performed by: NURSE PRACTITIONER

## 2023-03-03 PROCEDURE — G0463 HOSPITAL OUTPT CLINIC VISIT: HCPCS | Performed by: NURSE PRACTITIONER

## 2023-03-03 PROCEDURE — 82962 GLUCOSE BLOOD TEST: CPT | Performed by: NURSE PRACTITIONER

## 2023-03-03 PROCEDURE — 99214 OFFICE O/P EST MOD 30 MIN: CPT | Performed by: NURSE PRACTITIONER

## 2023-03-03 RX ORDER — INSULIN LISPRO 100 [IU]/ML
15 INJECTION, SOLUTION INTRAVENOUS; SUBCUTANEOUS
Qty: 39 ML | Refills: 1 | Status: SHIPPED | OUTPATIENT
Start: 2023-03-03

## 2023-03-03 RX ORDER — INSULIN DETEMIR 100 [IU]/ML
60 INJECTION, SOLUTION SUBCUTANEOUS 2 TIMES DAILY
Qty: 105 ML | Refills: 1 | Status: SHIPPED | OUTPATIENT
Start: 2023-03-03

## 2023-03-03 NOTE — PATIENT INSTRUCTIONS
Increase the Levemir to 60 units twice a day    Take Humalog 15 units 3 times a day with each meal

## 2023-03-09 ENCOUNTER — HOSPITAL ENCOUNTER (OUTPATIENT)
Dept: MRI IMAGING | Facility: HOSPITAL | Age: 58
Discharge: HOME | End: 2023-03-09
Admitting: FAMILY MEDICINE
Payer: COMMERCIAL

## 2023-03-09 DIAGNOSIS — R41.82 ALTERED MENTAL STATUS, UNSPECIFIED ALTERED MENTAL STATUS TYPE: ICD-10-CM

## 2023-03-09 PROCEDURE — 70551 MRI BRAIN STEM W/O DYE: CPT

## 2023-03-14 DIAGNOSIS — R11.0 NAUSEA: ICD-10-CM

## 2023-03-14 RX ORDER — ONDANSETRON HYDROCHLORIDE 8 MG/1
TABLET, FILM COATED ORAL
Qty: 60 TABLET | Refills: 1 | Status: SHIPPED | OUTPATIENT
Start: 2023-03-14

## 2023-03-14 NOTE — TELEPHONE ENCOUNTER
Med refill, not on active list but he states he does still take this.  He is completely out and needs this refilled asap.  Patient last seen 2/10/23.

## 2023-03-22 ENCOUNTER — PATIENT OUTREACH (OUTPATIENT)
Dept: CASE MANAGEMENT | Facility: OTHER | Age: 58
End: 2023-03-22
Payer: COMMERCIAL

## 2023-03-22 DIAGNOSIS — I63.9 CEREBROVASCULAR ACCIDENT (CVA), UNSPECIFIED MECHANISM: ICD-10-CM

## 2023-03-22 DIAGNOSIS — R18.8 CIRRHOSIS OF LIVER WITH ASCITES, UNSPECIFIED HEPATIC CIRRHOSIS TYPE: Primary | ICD-10-CM

## 2023-03-22 DIAGNOSIS — R73.9 ELEVATED BLOOD SUGAR: ICD-10-CM

## 2023-03-22 DIAGNOSIS — K74.60 CIRRHOSIS OF LIVER WITH ASCITES, UNSPECIFIED HEPATIC CIRRHOSIS TYPE: Primary | ICD-10-CM

## 2023-03-22 NOTE — OUTREACH NOTE
AMBULATORY CASE MANAGEMENT NOTE    Name and Relationship of Patient/Support Person: Nic Nicolas - Self    CCM Interim Update    Called and was able to reach patient. He denies any needs for refills. Reports compliance with insulin and improved blood sugars. He is aware of PCP visit next month. I informed PCP can do his DM foot exam and tank for a urine microalbumin as well. He agrees. Denies any falls, reports doing well. Care plan reviewed and updated in chart. He is aware to call if any needs. No ER visits this month, last A1c with BESSIE Pedraza 3/3/23 is down to 7.8%.        Education Documentation  fall prevention, taught by Yazmin Rodriguez, RN at 3/22/2023 10:06 AM.  Learner: Patient  Readiness: Acceptance  Method: Explanation, Teach Back  Response: Verbalizes Understanding  Comment: Patient denies any issues today and is feeling great.    activity, taught by Yazmin Rodriguez, RN at 3/22/2023 10:06 AM.  Learner: Patient  Readiness: Acceptance  Method: Explanation, Teach Back  Response: Verbalizes Understanding  Comment: Patient denies any issues today and is feeling great.    A1C Goal, taught by Yazmin Rodriguez, RN at 3/22/2023 10:06 AM.  Learner: Patient  Readiness: Acceptance  Method: Explanation, Teach Back  Response: Verbalizes Understanding  Comment: Patient denies any issues today and is feeling great.          Yazmin NICOLE  Ambulatory Case Management    3/22/2023, 10:09 EDT

## 2023-03-24 ENCOUNTER — HOSPITAL ENCOUNTER (EMERGENCY)
Facility: HOSPITAL | Age: 58
Discharge: HOME | End: 2023-03-24
Attending: EMERGENCY MEDICINE | Admitting: EMERGENCY MEDICINE
Payer: COMMERCIAL

## 2023-03-24 VITALS
TEMPERATURE: 98.3 F | HEART RATE: 94 BPM | WEIGHT: 266.76 LBS | DIASTOLIC BLOOD PRESSURE: 75 MMHG | RESPIRATION RATE: 20 BRPM | SYSTOLIC BLOOD PRESSURE: 145 MMHG | HEIGHT: 68 IN | BODY MASS INDEX: 40.43 KG/M2 | OXYGEN SATURATION: 100 %

## 2023-03-24 DIAGNOSIS — G24.01 TARDIVE DYSKINESIA: Primary | ICD-10-CM

## 2023-03-24 LAB
ALBUMIN SERPL-MCNC: 3.2 G/DL (ref 3.5–5.2)
ALBUMIN/GLOB SERPL: 1.1 G/DL
ALP SERPL-CCNC: 105 U/L (ref 39–117)
ALT SERPL W P-5'-P-CCNC: 16 U/L (ref 1–41)
AMMONIA BLD-SCNC: 77 UMOL/L (ref 16–60)
ANION GAP SERPL CALCULATED.3IONS-SCNC: 12.3 MMOL/L (ref 5–15)
AST SERPL-CCNC: 27 U/L (ref 1–40)
BASOPHILS # BLD AUTO: 0.03 10*3/MM3 (ref 0–0.2)
BASOPHILS NFR BLD AUTO: 0.8 % (ref 0–1.5)
BILIRUB SERPL-MCNC: 1.5 MG/DL (ref 0–1.2)
BILIRUB UR QL STRIP: NEGATIVE
BUN SERPL-MCNC: 15 MG/DL (ref 6–20)
BUN/CREAT SERPL: 14.3 (ref 7–25)
CALCIUM SPEC-SCNC: 8.2 MG/DL (ref 8.6–10.5)
CHLORIDE SERPL-SCNC: 99 MMOL/L (ref 98–107)
CLARITY UR: CLEAR
CO2 SERPL-SCNC: 17.7 MMOL/L (ref 22–29)
COLOR UR: ABNORMAL
CREAT SERPL-MCNC: 1.05 MG/DL (ref 0.76–1.27)
D-LACTATE SERPL-SCNC: 1.7 MMOL/L (ref 0.5–2)
DEPRECATED RDW RBC AUTO: 47.5 FL (ref 37–54)
EGFRCR SERPLBLD CKD-EPI 2021: 83.3 ML/MIN/1.73
EOSINOPHIL # BLD AUTO: 0.08 10*3/MM3 (ref 0–0.4)
EOSINOPHIL NFR BLD AUTO: 2 % (ref 0.3–6.2)
ERYTHROCYTE [DISTWIDTH] IN BLOOD BY AUTOMATED COUNT: 16.6 % (ref 12.3–15.4)
GLOBULIN UR ELPH-MCNC: 2.9 GM/DL
GLUCOSE SERPL-MCNC: 244 MG/DL (ref 65–99)
GLUCOSE UR STRIP-MCNC: ABNORMAL MG/DL
HCT VFR BLD AUTO: 25 % (ref 37.5–51)
HGB BLD-MCNC: 8 G/DL (ref 13–17.7)
HGB UR QL STRIP.AUTO: NEGATIVE
HOLD SPECIMEN: NORMAL
HOLD SPECIMEN: NORMAL
IMM GRANULOCYTES # BLD AUTO: 0.02 10*3/MM3 (ref 0–0.05)
IMM GRANULOCYTES NFR BLD AUTO: 0.5 % (ref 0–0.5)
KETONES UR QL STRIP: NEGATIVE
LEUKOCYTE ESTERASE UR QL STRIP.AUTO: NEGATIVE
LIPASE SERPL-CCNC: 63 U/L (ref 13–60)
LYMPHOCYTES # BLD AUTO: 0.74 10*3/MM3 (ref 0.7–3.1)
LYMPHOCYTES NFR BLD AUTO: 18.8 % (ref 19.6–45.3)
MCH RBC QN AUTO: 25.1 PG (ref 26.6–33)
MCHC RBC AUTO-ENTMCNC: 32 G/DL (ref 31.5–35.7)
MCV RBC AUTO: 78.4 FL (ref 79–97)
MONOCYTES # BLD AUTO: 0.45 10*3/MM3 (ref 0.1–0.9)
MONOCYTES NFR BLD AUTO: 11.4 % (ref 5–12)
NEUTROPHILS NFR BLD AUTO: 2.62 10*3/MM3 (ref 1.7–7)
NEUTROPHILS NFR BLD AUTO: 66.5 % (ref 42.7–76)
NITRITE UR QL STRIP: NEGATIVE
NRBC BLD AUTO-RTO: 0 /100 WBC (ref 0–0.2)
PH UR STRIP.AUTO: 5.5 [PH] (ref 5–8)
PLATELET # BLD AUTO: 85 10*3/MM3 (ref 140–450)
PMV BLD AUTO: 10.2 FL (ref 6–12)
POTASSIUM SERPL-SCNC: 3.8 MMOL/L (ref 3.5–5.2)
PROT SERPL-MCNC: 6.1 G/DL (ref 6–8.5)
PROT UR QL STRIP: NEGATIVE
RBC # BLD AUTO: 3.19 10*6/MM3 (ref 4.14–5.8)
SODIUM SERPL-SCNC: 129 MMOL/L (ref 136–145)
SP GR UR STRIP: 1.02 (ref 1–1.03)
UROBILINOGEN UR QL STRIP: ABNORMAL
WBC NRBC COR # BLD: 3.94 10*3/MM3 (ref 3.4–10.8)
WHOLE BLOOD HOLD COAG: NORMAL
WHOLE BLOOD HOLD SPECIMEN: NORMAL

## 2023-03-24 PROCEDURE — 83605 ASSAY OF LACTIC ACID: CPT

## 2023-03-24 PROCEDURE — 85025 COMPLETE CBC W/AUTO DIFF WBC: CPT

## 2023-03-24 PROCEDURE — 36415 COLL VENOUS BLD VENIPUNCTURE: CPT

## 2023-03-24 PROCEDURE — 82140 ASSAY OF AMMONIA: CPT

## 2023-03-24 PROCEDURE — 83690 ASSAY OF LIPASE: CPT

## 2023-03-24 PROCEDURE — 63710000001 DIPHENHYDRAMINE PER 50 MG: Performed by: EMERGENCY MEDICINE

## 2023-03-24 PROCEDURE — 81003 URINALYSIS AUTO W/O SCOPE: CPT | Performed by: EMERGENCY MEDICINE

## 2023-03-24 PROCEDURE — 99283 EMERGENCY DEPT VISIT LOW MDM: CPT

## 2023-03-24 PROCEDURE — 80053 COMPREHEN METABOLIC PANEL: CPT

## 2023-03-24 RX ORDER — DIPHENHYDRAMINE HCL 25 MG
25 CAPSULE ORAL ONCE
Status: COMPLETED | OUTPATIENT
Start: 2023-03-24 | End: 2023-03-24

## 2023-03-24 RX ORDER — HYDROCODONE BITARTRATE AND ACETAMINOPHEN 7.5; 325 MG/1; MG/1
1 TABLET ORAL ONCE
Status: COMPLETED | OUTPATIENT
Start: 2023-03-24 | End: 2023-03-24

## 2023-03-24 RX ORDER — SODIUM CHLORIDE 0.9 % (FLUSH) 0.9 %
10 SYRINGE (ML) INJECTION AS NEEDED
Status: DISCONTINUED | OUTPATIENT
Start: 2023-03-24 | End: 2023-03-24 | Stop reason: HOSPADM

## 2023-03-24 RX ORDER — DIPHENHYDRAMINE HCL 25 MG
25 CAPSULE ORAL EVERY 6 HOURS PRN
Qty: 28 CAPSULE | Refills: 0 | Status: SHIPPED | OUTPATIENT
Start: 2023-03-24

## 2023-03-24 RX ADMIN — HYDROCODONE BITARTRATE AND ACETAMINOPHEN 1 TABLET: 7.5; 325 TABLET ORAL at 12:26

## 2023-03-24 RX ADMIN — DIPHENHYDRAMINE HYDROCHLORIDE 25 MG: 25 CAPSULE ORAL at 12:26
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
## 2023-03-30 ENCOUNTER — PATIENT OUTREACH (OUTPATIENT)
Dept: CASE MANAGEMENT | Facility: OTHER | Age: 58
End: 2023-03-30
Payer: COMMERCIAL

## 2023-03-30 DIAGNOSIS — K76.82 HEPATIC ENCEPHALOPATHY: ICD-10-CM

## 2023-03-30 DIAGNOSIS — R73.9 ELEVATED BLOOD SUGAR: ICD-10-CM

## 2023-03-30 DIAGNOSIS — K74.60 CIRRHOSIS OF LIVER WITH ASCITES, UNSPECIFIED HEPATIC CIRRHOSIS TYPE: Primary | ICD-10-CM

## 2023-03-30 DIAGNOSIS — M54.50 CHRONIC LOW BACK PAIN, UNSPECIFIED BACK PAIN LATERALITY, UNSPECIFIED WHETHER SCIATICA PRESENT: ICD-10-CM

## 2023-03-30 DIAGNOSIS — G89.29 CHRONIC LOW BACK PAIN, UNSPECIFIED BACK PAIN LATERALITY, UNSPECIFIED WHETHER SCIATICA PRESENT: ICD-10-CM

## 2023-03-30 DIAGNOSIS — R18.8 CIRRHOSIS OF LIVER WITH ASCITES, UNSPECIFIED HEPATIC CIRRHOSIS TYPE: Primary | ICD-10-CM

## 2023-03-30 DIAGNOSIS — I63.9 CEREBROVASCULAR ACCIDENT (CVA), UNSPECIFIED MECHANISM: ICD-10-CM

## 2023-03-30 PROCEDURE — 99490 CHRNC CARE MGMT STAFF 1ST 20: CPT | Performed by: FAMILY MEDICINE

## 2023-03-30 NOTE — OUTREACH NOTE
[facility] End of Month Documentation    This Chronic Medical Management Care Plan for Nic Nicolas, 57 y.o. male, has been monitored and managed; reviewed; revised and a new plan of care implemented for the month of March.  A cumulative time of 23  minutes was spent on this patient record this month, including phone call with patient; chart review.    Regarding the patient's problems: has Arthritis, senescent; Colon polyps; Liver cirrhosis secondary to ROMO (nonalcoholic steatohepatitis) (HCC); Multiple episodes of deep venous thrombosis (HCC); High blood pressure; Hyperlipidemia; Other specified anemias; Sleep apnea; Systolic congestive heart failure (HCC); Bilateral lower extremity edema; Chronic cystitis; Chronic low back pain; Type 2 diabetes mellitus with hyperglycemia (HCC); Acid reflux; Acetabular fracture (HCC); Gross hematuria; Hesitancy of micturition; Gastrointestinal hemorrhage associated with peptic ulcer; Anxiety; Hepatic encephalopathy; Stroke (HCC); Amphetamine abuse (HCC); Class 3 severe obesity due to excess calories with serious comorbidity and body mass index (BMI) of 40.0 to 44.9 in adult (HCC); Hyperammonemia (HCC); and Moderate episode of recurrent major depressive disorder (HCC) on their problem list., the following items were addressed: medical records; medications and any changes can be found within the plan section of the note.  A detailed listing of time spent for chronic care management is tracked within each outreach encounter.  Current medications include:  has a current medication list which includes the following prescription(s): albuterol sulfate hfa, atorvastatin, buspirone, cetirizine, diphenhydramine, fluticasone, fluticasone, freestyle lite, furosemide, humalog kwikpen, hydrocodone-acetaminophen, levemir flexpen, lactulose, freestyle, magnesium oxide, omeprazole, ondansetron, ondansetron odt, polyethyl glycol-propyl glycol, potassium chloride, rifaximin, ropinirole, sertraline,  spironolactone, trazodone, vitamin d, and [DISCONTINUED] fluticasone. and the patient is reported to be patient is compliant with medication protocol, reports compliance,  Medications are reported to be non-effective in controlling symptoms and changes have been made to the medication protocol, BESSIE Pedraza made insulin adjustments at last visit 3/3/23.      The patient was monitored remotely for weight; activity level; blood glucose; mood & behavior; medications, reports everything is going well.    The patient's physical needs include:  needs met.     The patient's mental support needs include:  needs met    The patient's cognitive support needs include:  needs met    The patient's psychosocial support needs include:  continued support    The patient's functional needs include: needs met    The patient's environmental needs include:  not applicable, none    Care Plan overall comments:  reviewed and updated    Refer to previous outreach notes for more information on the areas listed above.    Monthly Billing Diagnoses  (K74.60,  R18.8) Cirrhosis of liver with ascites, unspecified hepatic cirrhosis type (HCC)    (R73.9) Elevated blood sugar    (I63.9) Cerebrovascular accident (CVA), unspecified mechanism (HCC)    (K76.82) Hepatic encephalopathy (HCC)    (M54.50,  G89.29) Chronic low back pain, unspecified back pain laterality, unspecified whether sciatica present    Medications   · Medications have been reconciled    Care Plan progress this month:      Recently Modified Care Plans Updates made since 2/27/2023 12:00 AM     Fall Risk (Adult)         Problem Priority Last Modified     ELS FALL RISK (ADULT) --  11/10/2022  3:00 PM by Yazmin Rodriguez RN              Goal Recent Progress Last Modified     Absence of Fall and Fall-Related Injury On track  3/22/2023  9:58 AM by Yazmin Rodriguez, RN     Evidence-based guidance:   Assess fall risk using a validated tool when available. Consider balance and gait impairment,  muscle weakness, diminished vision or hearing, environmental hazards, presence of urinary or bowel urgency and/or incontinence.   Communicate fall injury risk to interprofessional healthcare team.   Develop a fall prevention plan with the patient and family.   Promote use of personal vision and auditory aids.   Promote reorientation, appropriate sensory stimulation, and routines to decrease risk of fall when changes in mental status are present.   Assess assistance level required for safe and effective self-care; consider referral for home care.   Encourage physical activity, such as performance of self-care at highest level of ability, strength and balance exercise program, and provision of appropriate assistive devices; refer to rehabilitation therapy.   Refer to community-based fall prevention program where available.   If fall occurs, determine the cause and revise fall injury prevention plan.   Regularly review medication contribution to fall risk; consider risk related to polypharmacy and age.   Refer to pharmacist for consultation when concerns about medications are revealed.   Balance adequate pain management with potential for oversedation.   Provide guidance related to environmental modifications.   Consider supplementation with Vitamin D.    Notes:              Task Due Date Last Modified     Identify and Manage Contributors to Fall Risk --  3/22/2023  9:59 AM by Yazmin Rodriguez RN     Care Management Activities:      - barriers to safety identified  - cognition assessed      Notes: 3/22/23 DENIES ANY HOME ISSUES OR NEEDS AT THIS OUTREACH                Frailty Syndrome (Adult)         Problem Priority Last Modified     ELS DISEASE PROGRESSION (FRAILTY) (ADULT) --  11/10/2022  3:02 PM by Yazmin Rodriguez RN              Goal Recent Progress Last Modified     Disease Progression Prevented or Minimized On track  3/22/2023  9:59 AM by Yazmin Rodriguez RN     Evidence-based guidance:   Anticipate referral to  "pharmacist for recognition of exposure to potentially inappropriate medication, such as aspirin, statin, antihypertensive, diuretic, bisphosphonate, analgesic, laxative and antidepressant, hypnotic.   Assess for presence of frailty indicators.   Develop individualized, interprofessional combination therapy plan that may slow progression of frailty.   Facilitate opportunity for shared decision-making regarding individualized medication de-prescribing plan; include plan for weaning when necessary.   Prevent infection by use of skin and hand hygiene, as well as oral and dental care; avoid aspiration and change position frequently; receive flu and pneumonia vaccines.   Review medication list for those that may contribute to the development of pneumonia, such as antipsychotic or sedative and those that cause dry mouth or achlorhydria.   Promote a regular daily exercise program based on tolerance, ability and patient choice that will support positive thinking about disease or aging process and reduce fear of falling.   Provide frequent encouragement and praise that supports positive view of aging; avoid use of term \"frailty\" that contributes to negative self-regard.    Notes:              Task Due Date Last Modified     Alleviate Barriers to Frailty Treatment --  3/22/2023  9:59 AM by Yazmin Rodriguez RN     Care Management Activities:      - activity or exercise based on tolerance encouraged      Notes:              Problem Priority Last Modified     ELS MALNUTRITION (FRAILTY) (ADULT) --  11/10/2022  3:02 PM by Yazmin Rodriguez RN              Goal Recent Progress Last Modified     Malnutrition Prevented On track  3/22/2023 10:00 AM by Yazmin Rodriguez, RN     Evidence-based guidance:   Anticipate supplementation with oral nutrition supplements if unable to meet energy and protein requirements through usual foods and beverages.   Anticipate supplementation with vitamin D2 or D3 and calcium when insufficiency is suspected or " confirmed.   Complete a comprehensive nutrition assessment that includes the identification of macro and micro deficiencies, barriers to eating, cognition and mental status, as well as social determinants of health.   Mutually determine weight goal focusing on preservation or increase in lean body mass.   Provide guidance to caregiver regarding the need to offer feeding assistance in a way that is not demeaning and promotes as much independence as possible.   Provide ongoing encouragement and support as diet changes are made and supplements are added, including home visits and telephone calls.    Notes:              Task Due Date Last Modified     Optimize Nutrition Status --  3/22/2023 10:00 AM by Yazmin Rodriguez, RN     Care Management Activities:      - family/caregiver support provided  - support and encouragement provided      Notes:                      · Current Specialty Plan of Care Status signed by both patient and provider    Instructions   · Patient was provided an electronic copy of care plan  · CCM services were explained and offered and patient has accepted these services.  · Patient has given their written consent to receive CCM services and understands that this includes the authorization of electronic communication of medical information with the other treating providers.  · Patient understands that they may stop CCM services at any time and these changes will be effective at the end of the calendar month and will effectively revocate the agreement of CCM services.  · Patient understands that only one practitioner can furnish and be paid for CCM services during one calendar month.  Patient also understands that there may be co-payment or deductible fees in association with CCM services.  · Patient will continue with at least monthly follow-up calls with the Ambulatory .    Yazmin NICOLE  Ambulatory Case Management    3/30/2023, 17:37 EDT

## 2023-04-17 ENCOUNTER — PATIENT OUTREACH (OUTPATIENT)
Dept: CASE MANAGEMENT | Facility: OTHER | Age: 58
End: 2023-04-17
Payer: COMMERCIAL

## 2023-04-17 ENCOUNTER — LAB (OUTPATIENT)
Dept: LAB | Facility: HOSPITAL | Age: 58
End: 2023-04-17
Payer: COMMERCIAL

## 2023-04-17 ENCOUNTER — OFFICE VISIT (OUTPATIENT)
Dept: FAMILY MEDICINE CLINIC | Facility: CLINIC | Age: 58
End: 2023-04-17
Payer: COMMERCIAL

## 2023-04-17 VITALS
WEIGHT: 268.2 LBS | HEIGHT: 68 IN | DIASTOLIC BLOOD PRESSURE: 78 MMHG | HEART RATE: 83 BPM | BODY MASS INDEX: 40.65 KG/M2 | RESPIRATION RATE: 18 BRPM | SYSTOLIC BLOOD PRESSURE: 150 MMHG | TEMPERATURE: 97.8 F | OXYGEN SATURATION: 99 %

## 2023-04-17 DIAGNOSIS — I10 PRIMARY HYPERTENSION: Chronic | ICD-10-CM

## 2023-04-17 DIAGNOSIS — D64.9 ANEMIA, UNSPECIFIED TYPE: Primary | ICD-10-CM

## 2023-04-17 DIAGNOSIS — I63.9 CEREBROVASCULAR ACCIDENT (CVA), UNSPECIFIED MECHANISM: ICD-10-CM

## 2023-04-17 DIAGNOSIS — R18.8 CIRRHOSIS OF LIVER WITH ASCITES, UNSPECIFIED HEPATIC CIRRHOSIS TYPE: Primary | ICD-10-CM

## 2023-04-17 DIAGNOSIS — R10.9 STOMACH PAIN: ICD-10-CM

## 2023-04-17 DIAGNOSIS — E66.01 CLASS 3 SEVERE OBESITY DUE TO EXCESS CALORIES WITH SERIOUS COMORBIDITY AND BODY MASS INDEX (BMI) OF 40.0 TO 44.9 IN ADULT: Chronic | ICD-10-CM

## 2023-04-17 DIAGNOSIS — Z79.4 TYPE 2 DIABETES MELLITUS WITH HYPERGLYCEMIA, WITH LONG-TERM CURRENT USE OF INSULIN: Primary | Chronic | ICD-10-CM

## 2023-04-17 DIAGNOSIS — K75.81 LIVER CIRRHOSIS SECONDARY TO NASH (NONALCOHOLIC STEATOHEPATITIS): Chronic | ICD-10-CM

## 2023-04-17 DIAGNOSIS — K74.60 LIVER CIRRHOSIS SECONDARY TO NASH (NONALCOHOLIC STEATOHEPATITIS): Chronic | ICD-10-CM

## 2023-04-17 DIAGNOSIS — Z51.81 MEDICATION MONITORING ENCOUNTER: ICD-10-CM

## 2023-04-17 DIAGNOSIS — K76.82 HEPATIC ENCEPHALOPATHY: ICD-10-CM

## 2023-04-17 DIAGNOSIS — E11.65 TYPE 2 DIABETES MELLITUS WITH HYPERGLYCEMIA, WITH LONG-TERM CURRENT USE OF INSULIN: Primary | Chronic | ICD-10-CM

## 2023-04-17 DIAGNOSIS — R73.9 ELEVATED BLOOD SUGAR: ICD-10-CM

## 2023-04-17 DIAGNOSIS — K74.60 CIRRHOSIS OF LIVER WITH ASCITES, UNSPECIFIED HEPATIC CIRRHOSIS TYPE: Primary | ICD-10-CM

## 2023-04-17 DIAGNOSIS — E11.65 TYPE 2 DIABETES MELLITUS WITH HYPERGLYCEMIA, WITHOUT LONG-TERM CURRENT USE OF INSULIN: Chronic | ICD-10-CM

## 2023-04-17 LAB
ALBUMIN UR-MCNC: <1.2 MG/DL
BILIRUB BLD-MCNC: NEGATIVE MG/DL
CLARITY, POC: CLEAR
COLOR UR: YELLOW
CREAT UR-MCNC: 59.3 MG/DL
EXPIRATION DATE: ABNORMAL
GLUCOSE UR STRIP-MCNC: NEGATIVE MG/DL
KETONES UR QL: NEGATIVE
LEUKOCYTE EST, POC: NEGATIVE
Lab: ABNORMAL
MICROALBUMIN/CREAT UR: NORMAL MG/G{CREAT}
NITRITE UR-MCNC: NEGATIVE MG/ML
PH UR: 6 [PH] (ref 5–8)
POC AMPHETAMINES: NEGATIVE
POC BARBITURATES: NEGATIVE
POC BENZODIAZEPHINES: NEGATIVE
POC COCAINE: NEGATIVE
POC METHADONE: NEGATIVE
POC METHAMPHETAMINE SCREEN URINE: NEGATIVE
POC OPIATES: NEGATIVE
POC OXYCODONE: NEGATIVE
POC PHENCYCLIDINE: NEGATIVE
POC PROPOXYPHENE: NEGATIVE
POC THC: NEGATIVE
POC TRICYCLIC ANTIDEPRESSANTS: NEGATIVE
PROT UR STRIP-MCNC: NEGATIVE MG/DL
RBC # UR STRIP: ABNORMAL /UL
SP GR UR: 1.01 (ref 1–1.03)
UROBILINOGEN UR QL: NORMAL

## 2023-04-17 PROCEDURE — 82570 ASSAY OF URINE CREATININE: CPT | Performed by: FAMILY MEDICINE

## 2023-04-17 PROCEDURE — 83036 HEMOGLOBIN GLYCOSYLATED A1C: CPT

## 2023-04-17 PROCEDURE — 82043 UR ALBUMIN QUANTITATIVE: CPT | Performed by: FAMILY MEDICINE

## 2023-04-17 PROCEDURE — 36415 COLL VENOUS BLD VENIPUNCTURE: CPT

## 2023-04-17 PROCEDURE — 80053 COMPREHEN METABOLIC PANEL: CPT

## 2023-04-17 RX ORDER — ONDANSETRON 4 MG/1
4 TABLET, ORALLY DISINTEGRATING ORAL 4 TIMES DAILY PRN
Qty: 60 TABLET | Refills: 2 | Status: SHIPPED | OUTPATIENT
Start: 2023-04-17

## 2023-04-17 NOTE — PROGRESS NOTES
"Chief Complaint  Diabetes (Dm foot exam done in office ), Rectal Bleeding, Abdominal Pain, Fatigue, Neck Pain (Was kicked out of pain management x 1mo for positive UDS needs referral ), and Leg Pain (Painful and discolored )    Subjective        Nic Nicolas presents to [facility]  History of Present Illness  He is here today for management of his chronic medical conditions.  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     The patient is continuing to have chronic pain in his various joints.       His blood sugar is improving. His most recent A1c was 6.4.      The patient has no other complaints today and denies chest pain, shortness of breath, weakness, numbness, nausea, vomiting, diarrhea, dizziness or syncopal event.      Objective   Vital Signs:  /78   Pulse 83   Temp 97.8 °F (36.6 °C)   Resp 18   Ht 172.7 cm (67.99\")   Wt 122 kg (268 lb 3.2 oz)   SpO2 99%   BMI 40.79 kg/m²   Estimated body mass index is 40.79 kg/m² as calculated from the following:    Height as of this encounter: 172.7 cm (67.99\").    Weight as of this encounter: 122 kg (268 lb 3.2 oz).             Physical Exam  Vitals reviewed.   Constitutional:       Appearance: Normal appearance. He is well-developed. He is morbidly obese.   HENT:      Head: Normocephalic and atraumatic.      Right Ear: External ear normal.      Left Ear: External ear normal.      Mouth/Throat:      Pharynx: No oropharyngeal exudate.   Eyes:      Conjunctiva/sclera: Conjunctivae normal.      Pupils: Pupils are equal, round, and reactive to light.   Neck:      Vascular: No carotid bruit.   Cardiovascular:      Rate and Rhythm: Normal rate and regular rhythm.      Heart sounds: No " murmur heard.    No friction rub. No gallop.   Pulmonary:      Effort: Pulmonary effort is normal.      Breath sounds: Normal breath sounds. No wheezing or rhonchi.   Abdominal:      General: There is no distension.   Skin:     General: Skin is warm and dry.   Neurological:      Mental Status: He is alert and oriented to person, place, and time.      Cranial Nerves: No cranial nerve deficit.      Motor: No weakness.   Psychiatric:         Mood and Affect: Mood and affect normal.         Behavior: Behavior normal.         Thought Content: Thought content normal.         Judgment: Judgment normal.        Result Review :    CMP        1/11/2023    15:38 1/11/2023    17:52 2/5/2023    15:32 3/24/2023    10:40   CMP   Glucose 622   517   257   244     BUN 11   11   18   15     Creatinine 1.13   1.09   0.99   1.05     EGFR 76.3   79.7   89.4   83.3     Sodium 130   131   139   129     Potassium 3.9   4.1   5.3   3.8     Chloride 99   100   106   99     Calcium 8.7   8.4   8.9   8.2     Total Protein 6.4    5.9   6.1     Albumin 3.3    3.0   3.2     Globulin 3.1    2.9   2.9     Total Bilirubin 1.5    1.1   1.5     Alkaline Phosphatase 128    102   105     AST (SGOT) 31    37   27     ALT (SGPT) 25    18   16     Albumin/Globulin Ratio 1.1    1.0   1.1     BUN/Creatinine Ratio 9.7   10.1   18.2   14.3     Anion Gap 11.3   8.1   7.4   12.3       CBC        1/11/2023    15:38 2/5/2023    15:32 3/24/2023    10:40   CBC   WBC 3.71   3.08   3.94     RBC 2.99   2.98   3.19     Hemoglobin 8.5   8.3   8.0     Hematocrit 25.8   25.9   25.0     MCV 86.3   86.9   78.4     MCH 28.4   27.9   25.1     MCHC 32.9   32.0   32.0     RDW 15.5   17.2   16.6     Platelets 72   83   85       Lipid Panel        7/8/2022    17:07   Lipid Panel   Total Cholesterol 117     Triglycerides 31     HDL Cholesterol 59     VLDL Cholesterol 9     LDL Cholesterol  49     LDL/HDL Ratio 0.88       TSH        6/20/2022    16:52 7/8/2022    17:07 8/2/2022     10:35   TSH   TSH 6.840   4.450   3.170                    Assessment and Plan   Diagnoses and all orders for this visit:    1. Type 2 diabetes mellitus with hyperglycemia, with long-term current use of insulin (Primary)  Assessment & Plan:  Diabetes is improving with treatment.   Continue current treatment regimen.  Dietary recommendations for ADA diet.  Diabetes will be reassessed in 6 months.    Orders:  -     Microalbumin / Creatinine Urine Ratio - Urine, Clean Catch    2. Stomach pain  Comments:  I am not sure what is causing his stomach pain. My be due to his DM or Cirrosis. He was given a refill on his zofran and he will be seen back in three months.   Orders:  -     POCT urinalysis dipstick, automated    3. Medication monitoring encounter  -     POC Urine Drug Screen, Triage    4. Class 3 severe obesity due to excess calories with serious comorbidity and body mass index (BMI) of 40.0 to 44.9 in adult  Assessment & Plan:  Patient's (Body mass index is 40.79 kg/m².) indicates that they are morbidly/severely obese (BMI > 40 or > 35 with obesity - related health condition) with health conditions that include hypertension, coronary heart disease, diabetes mellitus and dyslipidemias . Weight is unchanged. BMI  is above average; BMI management plan is completed. We discussed low calorie, low carb based diet program, portion control and increasing exercise.       5. Primary hypertension  Assessment & Plan:  Hypertension is improving with treatment.  Continue current treatment regimen.  Dietary sodium restriction.  Weight loss.  Blood pressure will be reassessed at the next regular appointment.      Other orders  -     ondansetron ODT (ZOFRAN-ODT) 4 MG disintegrating tablet; Place 1 tablet on the tongue 4 (Four) Times a Day As Needed for Nausea or Vomiting.  Dispense: 60 tablet; Refill: 2           Follow Up   Return in about 4 months (around 8/17/2023).  Patient was given instructions and counseling regarding his  condition or for health maintenance advice. Please see specific information pulled into the AVS if appropriate.

## 2023-04-17 NOTE — ASSESSMENT & PLAN NOTE
Patient's (Body mass index is 40.79 kg/m².) indicates that they are morbidly/severely obese (BMI > 40 or > 35 with obesity - related health condition) with health conditions that include hypertension, coronary heart disease, diabetes mellitus and dyslipidemias . Weight is unchanged. BMI  is above average; BMI management plan is completed. We discussed low calorie, low carb based diet program, portion control and increasing exercise.

## 2023-04-17 NOTE — OUTREACH NOTE
AMBULATORY CASE MANAGEMENT NOTE    Name and Relationship of Patient/Support Person: Nic Nicolas Jackson - Self    CCM Interim Update    Patient seen in office, cannot get rides from his sister because she needs to have surgery herself. He is looking into [employer]. He cannot do his checking account or pill planner any longer. He is talking about wanting to go to assisted living and he was in a place in [place] and would like to go there. He just lost his nephew yesterday to an overdose. He is needing a referral for pain management. His daughter is having seizures, she is 22 years old. His children have not a lot to do with him. He is on second level at apartment and 14 steps to get down. Is working on getting first level apartment. Reports his belly is swelling again and may need to see Dr. Bradley. His sister is his main support.     Unable to comprehend things, read or write and keep things straight in his head. He will discuss concerns with PCP and go from there today. I will outreach to his sister who is out in truck to see what facility he was in.         Yazmin NICOLE  Ambulatory Case Management    4/17/2023, 14:54 EDT

## 2023-04-18 ENCOUNTER — LAB (OUTPATIENT)
Dept: ONCOLOGY | Facility: HOSPITAL | Age: 58
End: 2023-04-18
Payer: COMMERCIAL

## 2023-04-18 DIAGNOSIS — D64.9 ANEMIA, UNSPECIFIED TYPE: ICD-10-CM

## 2023-04-18 LAB
ALBUMIN SERPL-MCNC: 3.5 G/DL (ref 3.5–5.2)
ALBUMIN SERPL-MCNC: 3.6 G/DL (ref 3.5–5.2)
ALBUMIN/GLOB SERPL: 1.2 G/DL
ALBUMIN/GLOB SERPL: 1.4 G/DL
ALP SERPL-CCNC: 104 U/L (ref 39–117)
ALP SERPL-CCNC: 108 U/L (ref 39–117)
ALT SERPL W P-5'-P-CCNC: 14 U/L (ref 1–41)
ALT SERPL W P-5'-P-CCNC: 17 U/L (ref 1–41)
ANION GAP SERPL CALCULATED.3IONS-SCNC: 6.6 MMOL/L (ref 5–15)
ANION GAP SERPL CALCULATED.3IONS-SCNC: 7 MMOL/L (ref 5–15)
AST SERPL-CCNC: 25 U/L (ref 1–40)
AST SERPL-CCNC: 32 U/L (ref 1–40)
BASOPHILS # BLD AUTO: 0.03 10*3/MM3 (ref 0–0.2)
BASOPHILS NFR BLD AUTO: 0.9 % (ref 0–1.5)
BILIRUB SERPL-MCNC: 0.8 MG/DL (ref 0–1.2)
BILIRUB SERPL-MCNC: 0.9 MG/DL (ref 0–1.2)
BUN SERPL-MCNC: 18 MG/DL (ref 6–20)
BUN SERPL-MCNC: 21 MG/DL (ref 6–20)
BUN/CREAT SERPL: 11.7 (ref 7–25)
BUN/CREAT SERPL: 15.3 (ref 7–25)
CALCIUM SPEC-SCNC: 8.5 MG/DL (ref 8.6–10.5)
CALCIUM SPEC-SCNC: 8.7 MG/DL (ref 8.6–10.5)
CHLORIDE SERPL-SCNC: 104 MMOL/L (ref 98–107)
CHLORIDE SERPL-SCNC: 106 MMOL/L (ref 98–107)
CO2 SERPL-SCNC: 22.4 MMOL/L (ref 22–29)
CO2 SERPL-SCNC: 23 MMOL/L (ref 22–29)
CREAT SERPL-MCNC: 1.37 MG/DL (ref 0.76–1.27)
CREAT SERPL-MCNC: 1.54 MG/DL (ref 0.76–1.27)
DEPRECATED RDW RBC AUTO: 50.7 FL (ref 37–54)
EGFRCR SERPLBLD CKD-EPI 2021: 52.3 ML/MIN/1.73
EGFRCR SERPLBLD CKD-EPI 2021: 60.2 ML/MIN/1.73
EOSINOPHIL # BLD AUTO: 0.15 10*3/MM3 (ref 0–0.4)
EOSINOPHIL NFR BLD AUTO: 4.5 % (ref 0.3–6.2)
ERYTHROCYTE [DISTWIDTH] IN BLOOD BY AUTOMATED COUNT: 17.2 % (ref 12.3–15.4)
FERRITIN SERPL-MCNC: 20.55 NG/ML (ref 30–400)
GLOBULIN UR ELPH-MCNC: 2.5 GM/DL
GLOBULIN UR ELPH-MCNC: 3 GM/DL
GLUCOSE SERPL-MCNC: 281 MG/DL (ref 65–99)
GLUCOSE SERPL-MCNC: 289 MG/DL (ref 65–99)
HBA1C MFR BLD: 8.1 % (ref 4.8–5.6)
HCT VFR BLD AUTO: 25.7 % (ref 37.5–51)
HGB BLD-MCNC: 8 G/DL (ref 13–17.7)
IMM GRANULOCYTES # BLD AUTO: 0 10*3/MM3 (ref 0–0.05)
IMM GRANULOCYTES NFR BLD AUTO: 0 % (ref 0–0.5)
IRON 24H UR-MRATE: 31 MCG/DL (ref 59–158)
IRON SATN MFR SERPL: 6 % (ref 20–50)
LYMPHOCYTES # BLD AUTO: 0.59 10*3/MM3 (ref 0.7–3.1)
LYMPHOCYTES NFR BLD AUTO: 17.5 % (ref 19.6–45.3)
MCH RBC QN AUTO: 24.7 PG (ref 26.6–33)
MCHC RBC AUTO-ENTMCNC: 31.1 G/DL (ref 31.5–35.7)
MCV RBC AUTO: 79.3 FL (ref 79–97)
MONOCYTES # BLD AUTO: 0.29 10*3/MM3 (ref 0.1–0.9)
MONOCYTES NFR BLD AUTO: 8.6 % (ref 5–12)
NEUTROPHILS NFR BLD AUTO: 2.31 10*3/MM3 (ref 1.7–7)
NEUTROPHILS NFR BLD AUTO: 68.5 % (ref 42.7–76)
PLATELET # BLD AUTO: 95 10*3/MM3 (ref 140–450)
PMV BLD AUTO: 10.2 FL (ref 6–12)
POTASSIUM SERPL-SCNC: 5 MMOL/L (ref 3.5–5.2)
POTASSIUM SERPL-SCNC: 5.3 MMOL/L (ref 3.5–5.2)
PROT SERPL-MCNC: 6 G/DL (ref 6–8.5)
PROT SERPL-MCNC: 6.6 G/DL (ref 6–8.5)
RBC # BLD AUTO: 3.24 10*6/MM3 (ref 4.14–5.8)
SODIUM SERPL-SCNC: 133 MMOL/L (ref 136–145)
SODIUM SERPL-SCNC: 136 MMOL/L (ref 136–145)
TIBC SERPL-MCNC: 496 MCG/DL (ref 298–536)
TRANSFERRIN SERPL-MCNC: 333 MG/DL (ref 200–360)
WBC NRBC COR # BLD: 3.37 10*3/MM3 (ref 3.4–10.8)

## 2023-04-18 PROCEDURE — 83540 ASSAY OF IRON: CPT

## 2023-04-18 PROCEDURE — 82728 ASSAY OF FERRITIN: CPT

## 2023-04-18 PROCEDURE — 85025 COMPLETE CBC W/AUTO DIFF WBC: CPT

## 2023-04-18 PROCEDURE — 84466 ASSAY OF TRANSFERRIN: CPT

## 2023-04-18 PROCEDURE — 80053 COMPREHEN METABOLIC PANEL: CPT

## 2023-04-19 ENCOUNTER — APPOINTMENT (OUTPATIENT)
Dept: CT IMAGING | Facility: HOSPITAL | Age: 58
End: 2023-04-19
Payer: COMMERCIAL

## 2023-04-19 ENCOUNTER — HOSPITAL ENCOUNTER (EMERGENCY)
Facility: HOSPITAL | Age: 58
Discharge: HOME | End: 2023-04-20
Attending: EMERGENCY MEDICINE
Payer: COMMERCIAL

## 2023-04-19 DIAGNOSIS — Z86.59 MENTAL STATUS CHANGE RESOLVED: Primary | ICD-10-CM

## 2023-04-19 LAB
ALBUMIN SERPL-MCNC: 3.4 G/DL (ref 3.5–5.2)
ALBUMIN/GLOB SERPL: 1.2 G/DL
ALP SERPL-CCNC: 89 U/L (ref 39–117)
ALT SERPL W P-5'-P-CCNC: 15 U/L (ref 1–41)
AMMONIA BLD-SCNC: 60 UMOL/L (ref 16–60)
AMPHET+METHAMPHET UR QL: NEGATIVE
ANION GAP SERPL CALCULATED.3IONS-SCNC: 12.1 MMOL/L (ref 5–15)
ARTERIAL PATENCY WRIST A: ABNORMAL
AST SERPL-CCNC: 26 U/L (ref 1–40)
BACTERIA UR QL AUTO: ABNORMAL /HPF
BARBITURATES UR QL SCN: NEGATIVE
BASE EXCESS BLDA CALC-SCNC: -3.9 MMOL/L (ref -2–2)
BASOPHILS # BLD AUTO: 0.05 10*3/MM3 (ref 0–0.2)
BASOPHILS NFR BLD AUTO: 1.1 % (ref 0–1.5)
BDY SITE: ABNORMAL
BENZODIAZ UR QL SCN: NEGATIVE
BILIRUB SERPL-MCNC: 1.6 MG/DL (ref 0–1.2)
BILIRUB UR QL STRIP: NEGATIVE
BUN SERPL-MCNC: 19 MG/DL (ref 6–20)
BUN/CREAT SERPL: 16.8 (ref 7–25)
CA-I BLDA-SCNC: 1.13 MMOL/L (ref 1.13–1.32)
CALCIUM SPEC-SCNC: 9.3 MG/DL (ref 8.6–10.5)
CANNABINOIDS SERPL QL: NEGATIVE
CHLORIDE BLDA-SCNC: 109 MMOL/L (ref 98–106)
CHLORIDE SERPL-SCNC: 107 MMOL/L (ref 98–107)
CLARITY UR: CLEAR
CO2 SERPL-SCNC: 18.9 MMOL/L (ref 22–29)
COCAINE UR QL: NEGATIVE
COHGB MFR BLD: 0.7 % (ref 0–1.5)
COLOR UR: ABNORMAL
CREAT SERPL-MCNC: 1.13 MG/DL (ref 0.76–1.27)
DEPRECATED RDW RBC AUTO: 50.4 FL (ref 37–54)
EGFRCR SERPLBLD CKD-EPI 2021: 75.8 ML/MIN/1.73
EOSINOPHIL # BLD AUTO: 0.14 10*3/MM3 (ref 0–0.4)
EOSINOPHIL NFR BLD AUTO: 3 % (ref 0.3–6.2)
ERYTHROCYTE [DISTWIDTH] IN BLOOD BY AUTOMATED COUNT: 17.7 % (ref 12.3–15.4)
ETHANOL BLD-MCNC: <10 MG/DL (ref 0–10)
ETHANOL UR QL: <0.01 %
FHHB: 2.7 % (ref 0–5)
GAS FLOW AIRWAY: 0 LPM
GLOBULIN UR ELPH-MCNC: 2.9 GM/DL
GLUCOSE BLDA-MCNC: 137 MG/DL (ref 70–99)
GLUCOSE SERPL-MCNC: 142 MG/DL (ref 65–99)
GLUCOSE UR STRIP-MCNC: NEGATIVE MG/DL
HCO3 BLDA-SCNC: 19 MMOL/L (ref 22–26)
HCT VFR BLD AUTO: 26 % (ref 37.5–51)
HGB BLD-MCNC: 8.3 G/DL (ref 13–17.7)
HGB BLDA-MCNC: 9.1 G/DL (ref 13.8–16.4)
HGB UR QL STRIP.AUTO: ABNORMAL
HOLD SPECIMEN: NORMAL
HOLD SPECIMEN: NORMAL
HYALINE CASTS UR QL AUTO: ABNORMAL /LPF
IMM GRANULOCYTES # BLD AUTO: 0.01 10*3/MM3 (ref 0–0.05)
IMM GRANULOCYTES NFR BLD AUTO: 0.2 % (ref 0–0.5)
INHALED O2 CONCENTRATION: 21 %
INR PPP: 1.78 (ref 0.86–1.15)
KETONES UR QL STRIP: NEGATIVE
LACTATE BLDA-SCNC: 1.98 MMOL/L (ref 0.5–2)
LEUKOCYTE ESTERASE UR QL STRIP.AUTO: ABNORMAL
LYMPHOCYTES # BLD AUTO: 0.88 10*3/MM3 (ref 0.7–3.1)
LYMPHOCYTES NFR BLD AUTO: 19 % (ref 19.6–45.3)
MCH RBC QN AUTO: 24.7 PG (ref 26.6–33)
MCHC RBC AUTO-ENTMCNC: 31.9 G/DL (ref 31.5–35.7)
MCV RBC AUTO: 77.4 FL (ref 79–97)
METHADONE UR QL SCN: NEGATIVE
METHGB BLD QL: 0.3 % (ref 0–1.5)
MODALITY: ABNORMAL
MONOCYTES # BLD AUTO: 0.47 10*3/MM3 (ref 0.1–0.9)
MONOCYTES NFR BLD AUTO: 10.2 % (ref 5–12)
NEUTROPHILS NFR BLD AUTO: 3.08 10*3/MM3 (ref 1.7–7)
NEUTROPHILS NFR BLD AUTO: 66.5 % (ref 42.7–76)
NITRITE UR QL STRIP: NEGATIVE
NOTE: ABNORMAL
NRBC BLD AUTO-RTO: 0 /100 WBC (ref 0–0.2)
OPIATES UR QL: POSITIVE
OXYCODONE UR QL SCN: NEGATIVE
OXYHGB MFR BLDV: 96.3 % (ref 94–99)
PCO2 BLDA: 27.4 MM HG (ref 35–45)
PH BLDA: 7.46 PH UNITS (ref 7.35–7.45)
PH UR STRIP.AUTO: 6.5 [PH] (ref 5–8)
PLATELET # BLD AUTO: 108 10*3/MM3 (ref 140–450)
PMV BLD AUTO: 10.3 FL (ref 6–12)
PO2 BLD: 521 MM[HG] (ref 0–500)
PO2 BLDA: 109.4 MM HG (ref 80–100)
POTASSIUM BLDA-SCNC: 4.61 MMOL/L (ref 3.5–5)
POTASSIUM SERPL-SCNC: 4.8 MMOL/L (ref 3.5–5.2)
PROT SERPL-MCNC: 6.3 G/DL (ref 6–8.5)
PROT UR QL STRIP: NEGATIVE
PROTHROMBIN TIME: 20.6 SECONDS (ref 11.8–14.9)
QT INTERVAL: 382 MS
RBC # BLD AUTO: 3.36 10*6/MM3 (ref 4.14–5.8)
RBC # UR STRIP: ABNORMAL /HPF
REF LAB TEST METHOD: ABNORMAL
SAO2 % BLDCOA: 97.3 % (ref 95–99)
SODIUM BLDA-SCNC: 136.9 MMOL/L (ref 136–146)
SODIUM SERPL-SCNC: 138 MMOL/L (ref 136–145)
SP GR UR STRIP: 1.02 (ref 1–1.03)
SQUAMOUS #/AREA URNS HPF: ABNORMAL /HPF
UROBILINOGEN UR QL STRIP: ABNORMAL
WBC # UR STRIP: ABNORMAL /HPF
WBC NRBC COR # BLD: 4.63 10*3/MM3 (ref 3.4–10.8)
WHOLE BLOOD HOLD COAG: NORMAL
WHOLE BLOOD HOLD SPECIMEN: NORMAL

## 2023-04-19 PROCEDURE — 36600 WITHDRAWAL OF ARTERIAL BLOOD: CPT | Performed by: EMERGENCY MEDICINE

## 2023-04-19 PROCEDURE — 80307 DRUG TEST PRSMV CHEM ANLYZR: CPT | Performed by: EMERGENCY MEDICINE

## 2023-04-19 PROCEDURE — 82375 ASSAY CARBOXYHB QUANT: CPT | Performed by: EMERGENCY MEDICINE

## 2023-04-19 PROCEDURE — 70450 CT HEAD/BRAIN W/O DYE: CPT

## 2023-04-19 PROCEDURE — 81001 URINALYSIS AUTO W/SCOPE: CPT | Performed by: EMERGENCY MEDICINE

## 2023-04-19 PROCEDURE — 93005 ELECTROCARDIOGRAM TRACING: CPT

## 2023-04-19 PROCEDURE — 85610 PROTHROMBIN TIME: CPT | Performed by: EMERGENCY MEDICINE

## 2023-04-19 PROCEDURE — 80053 COMPREHEN METABOLIC PANEL: CPT

## 2023-04-19 PROCEDURE — 99285 EMERGENCY DEPT VISIT HI MDM: CPT

## 2023-04-19 PROCEDURE — 85025 COMPLETE CBC W/AUTO DIFF WBC: CPT

## 2023-04-19 PROCEDURE — 93005 ELECTROCARDIOGRAM TRACING: CPT | Performed by: EMERGENCY MEDICINE

## 2023-04-19 PROCEDURE — 82140 ASSAY OF AMMONIA: CPT | Performed by: EMERGENCY MEDICINE

## 2023-04-19 PROCEDURE — 83050 HGB METHEMOGLOBIN QUAN: CPT | Performed by: EMERGENCY MEDICINE

## 2023-04-19 PROCEDURE — 82805 BLOOD GASES W/O2 SATURATION: CPT | Performed by: EMERGENCY MEDICINE

## 2023-04-19 PROCEDURE — 82077 ASSAY SPEC XCP UR&BREATH IA: CPT | Performed by: EMERGENCY MEDICINE

## 2023-04-19 RX ORDER — SODIUM CHLORIDE 0.9 % (FLUSH) 0.9 %
10 SYRINGE (ML) INJECTION AS NEEDED
Status: DISCONTINUED | OUTPATIENT
Start: 2023-04-19 | End: 2023-04-20 | Stop reason: HOSPADM

## 2023-04-19 NOTE — ED NOTES
[facility] called for [facility] arrives and patient is outside with neighbor.  Patient lives alone so last known normal is unknown.  Pt has cirrhosis, normally has abnormal ammonia levels.  Woke up from  Bed and there was blood on the bed potentially from cellulitis on leg.  Chronic back pain.  A&O to person and place/x2.  194/70  105 HR  132 GLUCOSE  Pt doesn't think hes taken medication today.

## 2023-04-20 VITALS
OXYGEN SATURATION: 96 % | DIASTOLIC BLOOD PRESSURE: 74 MMHG | WEIGHT: 259.92 LBS | HEART RATE: 79 BPM | HEIGHT: 68 IN | BODY MASS INDEX: 39.39 KG/M2 | RESPIRATION RATE: 18 BRPM | TEMPERATURE: 98.4 F | SYSTOLIC BLOOD PRESSURE: 160 MMHG
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
## 2023-04-20 NOTE — ED NOTES
This RN attempted straight catheterization x 2 and was unsuccessful. Patient is attempting to urinate into a urinal at this time.

## 2023-04-25 LAB — QT INTERVAL: 382 MS
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
## 2023-04-27 ENCOUNTER — PATIENT OUTREACH (OUTPATIENT)
Dept: CASE MANAGEMENT | Facility: OTHER | Age: 58
End: 2023-04-27
Payer: COMMERCIAL

## 2023-04-27 ENCOUNTER — TELEPHONE (OUTPATIENT)
Dept: CASE MANAGEMENT | Facility: OTHER | Age: 58
End: 2023-04-27
Payer: COMMERCIAL

## 2023-04-27 ENCOUNTER — HOSPITAL ENCOUNTER (INPATIENT)
Facility: HOSPITAL | Age: 58
LOS: 5 days | Discharge: HOME HEALTH | End: 2023-05-02
Attending: STUDENT IN AN ORGANIZED HEALTH CARE EDUCATION/TRAINING PROGRAM | Admitting: INTERNAL MEDICINE
Payer: COMMERCIAL

## 2023-04-27 ENCOUNTER — OFFICE VISIT (OUTPATIENT)
Dept: ONCOLOGY | Facility: HOSPITAL | Age: 58
End: 2023-04-27
Payer: COMMERCIAL

## 2023-04-27 ENCOUNTER — OFFICE VISIT (OUTPATIENT)
Dept: GASTROENTEROLOGY | Facility: CLINIC | Age: 58
End: 2023-04-27
Payer: COMMERCIAL

## 2023-04-27 ENCOUNTER — APPOINTMENT (OUTPATIENT)
Dept: CT IMAGING | Facility: HOSPITAL | Age: 58
End: 2023-04-27
Payer: COMMERCIAL

## 2023-04-27 VITALS
DIASTOLIC BLOOD PRESSURE: 60 MMHG | HEIGHT: 68 IN | SYSTOLIC BLOOD PRESSURE: 147 MMHG | HEART RATE: 100 BPM | WEIGHT: 273 LBS | OXYGEN SATURATION: 100 % | BODY MASS INDEX: 41.37 KG/M2

## 2023-04-27 VITALS
BODY MASS INDEX: 39.55 KG/M2 | TEMPERATURE: 98.7 F | RESPIRATION RATE: 18 BRPM | SYSTOLIC BLOOD PRESSURE: 155 MMHG | DIASTOLIC BLOOD PRESSURE: 84 MMHG | WEIGHT: 260.14 LBS | HEART RATE: 102 BPM | OXYGEN SATURATION: 100 %

## 2023-04-27 DIAGNOSIS — K76.0 NONALCOHOLIC FATTY LIVER DISEASE: ICD-10-CM

## 2023-04-27 DIAGNOSIS — K62.5 RECTAL BLEEDING: ICD-10-CM

## 2023-04-27 DIAGNOSIS — E72.20 HYPERAMMONEMIA: ICD-10-CM

## 2023-04-27 DIAGNOSIS — K92.0 HEMATEMESIS, UNSPECIFIED WHETHER NAUSEA PRESENT: Primary | ICD-10-CM

## 2023-04-27 DIAGNOSIS — D50.0 IRON DEFICIENCY ANEMIA DUE TO CHRONIC BLOOD LOSS: ICD-10-CM

## 2023-04-27 DIAGNOSIS — K64.9 BLEEDING HEMORRHOID: ICD-10-CM

## 2023-04-27 DIAGNOSIS — K92.2 GASTROINTESTINAL HEMORRHAGE, UNSPECIFIED GASTROINTESTINAL HEMORRHAGE TYPE: ICD-10-CM

## 2023-04-27 DIAGNOSIS — R18.8 CIRRHOSIS OF LIVER WITH ASCITES, UNSPECIFIED HEPATIC CIRRHOSIS TYPE: Primary | ICD-10-CM

## 2023-04-27 DIAGNOSIS — K76.82 HEPATIC ENCEPHALOPATHY: ICD-10-CM

## 2023-04-27 DIAGNOSIS — K74.60 CIRRHOSIS OF LIVER WITH ASCITES, UNSPECIFIED HEPATIC CIRRHOSIS TYPE: Primary | ICD-10-CM

## 2023-04-27 DIAGNOSIS — E11.65 TYPE 2 DIABETES MELLITUS WITH HYPERGLYCEMIA, WITHOUT LONG-TERM CURRENT USE OF INSULIN: Chronic | ICD-10-CM

## 2023-04-27 DIAGNOSIS — E66.01 CLASS 3 SEVERE OBESITY DUE TO EXCESS CALORIES WITH SERIOUS COMORBIDITY AND BODY MASS INDEX (BMI) OF 40.0 TO 44.9 IN ADULT: Chronic | ICD-10-CM

## 2023-04-27 DIAGNOSIS — Z73.89 DELAYED SELF-CARE SKILLS: ICD-10-CM

## 2023-04-27 DIAGNOSIS — R11.0 NAUSEA: ICD-10-CM

## 2023-04-27 DIAGNOSIS — F15.10 AMPHETAMINE ABUSE: ICD-10-CM

## 2023-04-27 DIAGNOSIS — K74.60 CIRRHOSIS OF LIVER WITH ASCITES, UNSPECIFIED HEPATIC CIRRHOSIS TYPE: ICD-10-CM

## 2023-04-27 DIAGNOSIS — R42 DIZZINESS: ICD-10-CM

## 2023-04-27 DIAGNOSIS — G89.29 CHRONIC LOW BACK PAIN, UNSPECIFIED BACK PAIN LATERALITY, UNSPECIFIED WHETHER SCIATICA PRESENT: ICD-10-CM

## 2023-04-27 DIAGNOSIS — R18.8 CIRRHOSIS OF LIVER WITH ASCITES, UNSPECIFIED HEPATIC CIRRHOSIS TYPE: ICD-10-CM

## 2023-04-27 DIAGNOSIS — K74.60 LIVER CIRRHOSIS SECONDARY TO NASH (NONALCOHOLIC STEATOHEPATITIS): ICD-10-CM

## 2023-04-27 DIAGNOSIS — F41.9 ANXIETY: ICD-10-CM

## 2023-04-27 DIAGNOSIS — R53.1 WEAKNESS GENERALIZED: ICD-10-CM

## 2023-04-27 DIAGNOSIS — Z78.9 DECREASED ACTIVITIES OF DAILY LIVING (ADL): ICD-10-CM

## 2023-04-27 DIAGNOSIS — E11.65 UNCONTROLLED TYPE 2 DIABETES MELLITUS WITH HYPERGLYCEMIA: ICD-10-CM

## 2023-04-27 DIAGNOSIS — M54.50 CHRONIC LOW BACK PAIN, UNSPECIFIED BACK PAIN LATERALITY, UNSPECIFIED WHETHER SCIATICA PRESENT: ICD-10-CM

## 2023-04-27 DIAGNOSIS — R25.2 MUSCLE CRAMPS: ICD-10-CM

## 2023-04-27 DIAGNOSIS — R26.2 DIFFICULTY WALKING: ICD-10-CM

## 2023-04-27 DIAGNOSIS — K75.81 LIVER CIRRHOSIS SECONDARY TO NASH (NONALCOHOLIC STEATOHEPATITIS): ICD-10-CM

## 2023-04-27 DIAGNOSIS — D64.9 ANEMIA, UNSPECIFIED TYPE: Primary | ICD-10-CM

## 2023-04-27 DIAGNOSIS — R10.84 GENERALIZED ABDOMINAL PAIN: ICD-10-CM

## 2023-04-27 DIAGNOSIS — I63.9 CEREBROVASCULAR ACCIDENT (CVA), UNSPECIFIED MECHANISM: ICD-10-CM

## 2023-04-27 DIAGNOSIS — R29.6 FREQUENT FALLS: ICD-10-CM

## 2023-04-27 LAB
ABO GROUP BLD: NORMAL
ALBUMIN SERPL-MCNC: 3.5 G/DL (ref 3.5–5.2)
ALBUMIN/GLOB SERPL: 1.2 G/DL
ALP SERPL-CCNC: 88 U/L (ref 39–117)
ALT SERPL W P-5'-P-CCNC: 18 U/L (ref 1–41)
AMMONIA BLD-SCNC: 119 UMOL/L (ref 16–60)
ANION GAP SERPL CALCULATED.3IONS-SCNC: 11.6 MMOL/L (ref 5–15)
APTT PPP: 35.8 SECONDS (ref 78–95.9)
AST SERPL-CCNC: 30 U/L (ref 1–40)
BASOPHILS # BLD AUTO: 0.03 10*3/MM3 (ref 0–0.2)
BASOPHILS NFR BLD AUTO: 0.7 % (ref 0–1.5)
BILIRUB SERPL-MCNC: 0.8 MG/DL (ref 0–1.2)
BLD GP AB SCN SERPL QL: NEGATIVE
BUN SERPL-MCNC: 27 MG/DL (ref 6–20)
BUN/CREAT SERPL: 16.7 (ref 7–25)
CALCIUM SPEC-SCNC: 8.5 MG/DL (ref 8.6–10.5)
CHLORIDE SERPL-SCNC: 101 MMOL/L (ref 98–107)
CO2 SERPL-SCNC: 16.4 MMOL/L (ref 22–29)
CREAT SERPL-MCNC: 1.62 MG/DL (ref 0.76–1.27)
D-LACTATE SERPL-SCNC: 1.3 MMOL/L (ref 0.5–2)
D-LACTATE SERPL-SCNC: 2.1 MMOL/L (ref 0.5–2)
DEPRECATED RDW RBC AUTO: 49.2 FL (ref 37–54)
EGFRCR SERPLBLD CKD-EPI 2021: 49.2 ML/MIN/1.73
EOSINOPHIL # BLD AUTO: 0.13 10*3/MM3 (ref 0–0.4)
EOSINOPHIL NFR BLD AUTO: 2.9 % (ref 0.3–6.2)
ERYTHROCYTE [DISTWIDTH] IN BLOOD BY AUTOMATED COUNT: 17.4 % (ref 12.3–15.4)
GLOBULIN UR ELPH-MCNC: 2.9 GM/DL
GLUCOSE SERPL-MCNC: 277 MG/DL (ref 65–99)
HCT VFR BLD AUTO: 23.5 % (ref 37.5–51)
HGB BLD-MCNC: 7.5 G/DL (ref 13–17.7)
HOLD SPECIMEN: NORMAL
HOLD SPECIMEN: NORMAL
IMM GRANULOCYTES # BLD AUTO: 0.01 10*3/MM3 (ref 0–0.05)
IMM GRANULOCYTES NFR BLD AUTO: 0.2 % (ref 0–0.5)
INR PPP: 1.91 (ref 0.86–1.15)
LYMPHOCYTES # BLD AUTO: 0.72 10*3/MM3 (ref 0.7–3.1)
LYMPHOCYTES NFR BLD AUTO: 16.1 % (ref 19.6–45.3)
MAGNESIUM SERPL-MCNC: 1.9 MG/DL (ref 1.6–2.6)
MCH RBC QN AUTO: 24.7 PG (ref 26.6–33)
MCHC RBC AUTO-ENTMCNC: 31.9 G/DL (ref 31.5–35.7)
MCV RBC AUTO: 77.3 FL (ref 79–97)
MONOCYTES # BLD AUTO: 0.5 10*3/MM3 (ref 0.1–0.9)
MONOCYTES NFR BLD AUTO: 11.2 % (ref 5–12)
NEUTROPHILS NFR BLD AUTO: 3.09 10*3/MM3 (ref 1.7–7)
NEUTROPHILS NFR BLD AUTO: 68.9 % (ref 42.7–76)
NRBC BLD AUTO-RTO: 0 /100 WBC (ref 0–0.2)
PLATELET # BLD AUTO: 95 10*3/MM3 (ref 140–450)
PMV BLD AUTO: 10.7 FL (ref 6–12)
POTASSIUM SERPL-SCNC: 5.5 MMOL/L (ref 3.5–5.2)
PROCALCITONIN SERPL-MCNC: 0.22 NG/ML (ref 0–0.25)
PROT SERPL-MCNC: 6.4 G/DL (ref 6–8.5)
PROTHROMBIN TIME: 21.7 SECONDS (ref 11.8–14.9)
RBC # BLD AUTO: 3.04 10*6/MM3 (ref 4.14–5.8)
RH BLD: POSITIVE
SODIUM SERPL-SCNC: 129 MMOL/L (ref 136–145)
T&S EXPIRATION DATE: NORMAL
WBC NRBC COR # BLD: 4.48 10*3/MM3 (ref 3.4–10.8)
WHOLE BLOOD HOLD COAG: NORMAL
WHOLE BLOOD HOLD SPECIMEN: NORMAL

## 2023-04-27 PROCEDURE — 99223 1ST HOSP IP/OBS HIGH 75: CPT | Performed by: INTERNAL MEDICINE

## 2023-04-27 PROCEDURE — 83735 ASSAY OF MAGNESIUM: CPT | Performed by: NURSE PRACTITIONER

## 2023-04-27 PROCEDURE — 86900 BLOOD TYPING SEROLOGIC ABO: CPT

## 2023-04-27 PROCEDURE — 86901 BLOOD TYPING SEROLOGIC RH(D): CPT

## 2023-04-27 PROCEDURE — 99285 EMERGENCY DEPT VISIT HI MDM: CPT

## 2023-04-27 PROCEDURE — 25010000002 HYDROMORPHONE 1 MG/ML SOLUTION: Performed by: NURSE PRACTITIONER

## 2023-04-27 PROCEDURE — 25010000002 ONDANSETRON PER 1 MG: Performed by: NURSE PRACTITIONER

## 2023-04-27 PROCEDURE — 85730 THROMBOPLASTIN TIME PARTIAL: CPT | Performed by: NURSE PRACTITIONER

## 2023-04-27 PROCEDURE — 83605 ASSAY OF LACTIC ACID: CPT

## 2023-04-27 PROCEDURE — 87040 BLOOD CULTURE FOR BACTERIA: CPT

## 2023-04-27 PROCEDURE — 86850 RBC ANTIBODY SCREEN: CPT

## 2023-04-27 PROCEDURE — 25010000002 HYDROMORPHONE 1 MG/ML SOLUTION: Performed by: INTERNAL MEDICINE

## 2023-04-27 PROCEDURE — 84145 PROCALCITONIN (PCT): CPT | Performed by: NURSE PRACTITIONER

## 2023-04-27 PROCEDURE — 82948 REAGENT STRIP/BLOOD GLUCOSE: CPT

## 2023-04-27 PROCEDURE — 82140 ASSAY OF AMMONIA: CPT | Performed by: NURSE PRACTITIONER

## 2023-04-27 PROCEDURE — 86923 COMPATIBILITY TEST ELECTRIC: CPT

## 2023-04-27 PROCEDURE — 85025 COMPLETE CBC W/AUTO DIFF WBC: CPT

## 2023-04-27 PROCEDURE — P9016 RBC LEUKOCYTES REDUCED: HCPCS

## 2023-04-27 PROCEDURE — G0463 HOSPITAL OUTPT CLINIC VISIT: HCPCS | Performed by: INTERNAL MEDICINE

## 2023-04-27 PROCEDURE — 25510000001 IOPAMIDOL PER 1 ML: Performed by: NURSE PRACTITIONER

## 2023-04-27 PROCEDURE — 36430 TRANSFUSION BLD/BLD COMPNT: CPT

## 2023-04-27 PROCEDURE — 36415 COLL VENOUS BLD VENIPUNCTURE: CPT

## 2023-04-27 PROCEDURE — 80053 COMPREHEN METABOLIC PANEL: CPT

## 2023-04-27 PROCEDURE — 25010000002 OCTREOTIDE PER 25 MCG: Performed by: INTERNAL MEDICINE

## 2023-04-27 PROCEDURE — 25010000002 OCTREOTIDE PER 25 MCG: Performed by: STUDENT IN AN ORGANIZED HEALTH CARE EDUCATION/TRAINING PROGRAM

## 2023-04-27 PROCEDURE — 74177 CT ABD & PELVIS W/CONTRAST: CPT

## 2023-04-27 PROCEDURE — 85610 PROTHROMBIN TIME: CPT | Performed by: NURSE PRACTITIONER

## 2023-04-27 RX ORDER — OCTREOTIDE ACETATE 50 UG/ML
50 INJECTION, SOLUTION INTRAVENOUS; SUBCUTANEOUS ONCE
Status: COMPLETED | OUTPATIENT
Start: 2023-04-27 | End: 2023-04-27

## 2023-04-27 RX ORDER — SODIUM CHLORIDE 9 MG/ML
250 INJECTION, SOLUTION INTRAVENOUS ONCE
OUTPATIENT
Start: 2023-05-04

## 2023-04-27 RX ORDER — ACETAMINOPHEN 325 MG/1
650 TABLET ORAL ONCE
OUTPATIENT
Start: 2023-05-11

## 2023-04-27 RX ORDER — ONDANSETRON 2 MG/ML
4 INJECTION INTRAMUSCULAR; INTRAVENOUS ONCE
Status: COMPLETED | OUTPATIENT
Start: 2023-04-27 | End: 2023-04-27

## 2023-04-27 RX ORDER — SODIUM CHLORIDE 0.9 % (FLUSH) 0.9 %
10 SYRINGE (ML) INJECTION AS NEEDED
Status: DISCONTINUED | OUTPATIENT
Start: 2023-04-27 | End: 2023-05-02 | Stop reason: HOSPADM

## 2023-04-27 RX ORDER — DEXTROSE MONOHYDRATE 25 G/50ML
25 INJECTION, SOLUTION INTRAVENOUS
Status: DISCONTINUED | OUTPATIENT
Start: 2023-04-27 | End: 2023-04-30 | Stop reason: SDUPTHER

## 2023-04-27 RX ORDER — DEXAMETHASONE SODIUM PHOSPHATE 4 MG/ML
4 INJECTION, SOLUTION INTRA-ARTICULAR; INTRALESIONAL; INTRAMUSCULAR; INTRAVENOUS; SOFT TISSUE ONCE
Start: 2023-04-27 | End: 2023-04-27

## 2023-04-27 RX ORDER — SODIUM CHLORIDE 9 MG/ML
250 INJECTION, SOLUTION INTRAVENOUS ONCE
OUTPATIENT
Start: 2023-05-18

## 2023-04-27 RX ORDER — SODIUM CHLORIDE 0.9 % (FLUSH) 0.9 %
10 SYRINGE (ML) INJECTION EVERY 12 HOURS SCHEDULED
Status: DISCONTINUED | OUTPATIENT
Start: 2023-04-27 | End: 2023-05-02 | Stop reason: HOSPADM

## 2023-04-27 RX ORDER — SODIUM CHLORIDE 9 MG/ML
125 INJECTION, SOLUTION INTRAVENOUS CONTINUOUS
Status: ACTIVE | OUTPATIENT
Start: 2023-04-27 | End: 2023-04-28

## 2023-04-27 RX ORDER — TRANEXAMIC ACID 650 MG/1
1 TABLET ORAL 2 TIMES DAILY
Qty: 14 TABLET | Refills: 2 | Status: ON HOLD | OUTPATIENT
Start: 2023-04-27 | End: 2023-05-04

## 2023-04-27 RX ORDER — SODIUM CHLORIDE 9 MG/ML
250 INJECTION, SOLUTION INTRAVENOUS ONCE
OUTPATIENT
Start: 2023-05-11

## 2023-04-27 RX ORDER — NICOTINE POLACRILEX 4 MG
15 LOZENGE BUCCAL
Status: DISCONTINUED | OUTPATIENT
Start: 2023-04-27 | End: 2023-04-30 | Stop reason: SDUPTHER

## 2023-04-27 RX ORDER — ACETAMINOPHEN 325 MG/1
650 TABLET ORAL ONCE
OUTPATIENT
Start: 2023-04-27

## 2023-04-27 RX ORDER — ACETAMINOPHEN 325 MG/1
650 TABLET ORAL ONCE
OUTPATIENT
Start: 2023-05-18

## 2023-04-27 RX ORDER — NITROGLYCERIN 0.4 MG/1
0.4 TABLET SUBLINGUAL
Status: DISCONTINUED | OUTPATIENT
Start: 2023-04-27 | End: 2023-04-28

## 2023-04-27 RX ORDER — PANTOPRAZOLE SODIUM 40 MG/10ML
40 INJECTION, POWDER, LYOPHILIZED, FOR SOLUTION INTRAVENOUS ONCE
Status: COMPLETED | OUTPATIENT
Start: 2023-04-27 | End: 2023-04-27

## 2023-04-27 RX ORDER — SODIUM CHLORIDE 9 MG/ML
40 INJECTION, SOLUTION INTRAVENOUS AS NEEDED
Status: DISCONTINUED | OUTPATIENT
Start: 2023-04-27 | End: 2023-05-02 | Stop reason: HOSPADM

## 2023-04-27 RX ORDER — MORPHINE SULFATE 2 MG/ML
2 INJECTION, SOLUTION INTRAMUSCULAR; INTRAVENOUS
Status: DISCONTINUED | OUTPATIENT
Start: 2023-04-27 | End: 2023-04-28

## 2023-04-27 RX ORDER — ONDANSETRON 2 MG/ML
4 INJECTION INTRAMUSCULAR; INTRAVENOUS EVERY 6 HOURS PRN
Status: DISCONTINUED | OUTPATIENT
Start: 2023-04-27 | End: 2023-05-02 | Stop reason: HOSPADM

## 2023-04-27 RX ORDER — DEXAMETHASONE SODIUM PHOSPHATE 4 MG/ML
4 INJECTION, SOLUTION INTRA-ARTICULAR; INTRALESIONAL; INTRAMUSCULAR; INTRAVENOUS; SOFT TISSUE ONCE
Start: 2023-05-18 | End: 2023-05-18

## 2023-04-27 RX ORDER — DEXAMETHASONE SODIUM PHOSPHATE 4 MG/ML
4 INJECTION, SOLUTION INTRA-ARTICULAR; INTRALESIONAL; INTRAMUSCULAR; INTRAVENOUS; SOFT TISSUE ONCE
Start: 2023-05-04 | End: 2023-05-04

## 2023-04-27 RX ORDER — SODIUM CHLORIDE 9 MG/ML
250 INJECTION, SOLUTION INTRAVENOUS ONCE
OUTPATIENT
Start: 2023-04-27

## 2023-04-27 RX ORDER — ACETAMINOPHEN 325 MG/1
650 TABLET ORAL ONCE
OUTPATIENT
Start: 2023-05-04

## 2023-04-27 RX ORDER — DEXAMETHASONE SODIUM PHOSPHATE 4 MG/ML
4 INJECTION, SOLUTION INTRA-ARTICULAR; INTRALESIONAL; INTRAMUSCULAR; INTRAVENOUS; SOFT TISSUE ONCE
Start: 2023-05-11 | End: 2023-05-11

## 2023-04-27 RX ADMIN — OCTREOTIDE ACETATE 50 MCG/HR: 500 INJECTION, SOLUTION INTRAVENOUS; SUBCUTANEOUS at 22:04

## 2023-04-27 RX ADMIN — IOPAMIDOL 100 ML: 755 INJECTION, SOLUTION INTRAVENOUS at 19:57

## 2023-04-27 RX ADMIN — SODIUM CHLORIDE 125 ML/HR: 9 INJECTION, SOLUTION INTRAVENOUS at 23:38

## 2023-04-27 RX ADMIN — PANTOPRAZOLE SODIUM 8 MG/HR: 40 INJECTION, POWDER, FOR SOLUTION INTRAVENOUS at 21:35

## 2023-04-27 RX ADMIN — HYDROMORPHONE HYDROCHLORIDE 0.5 MG: 1 INJECTION, SOLUTION INTRAMUSCULAR; INTRAVENOUS; SUBCUTANEOUS at 21:36

## 2023-04-27 RX ADMIN — DEXTROSE MONOHYDRATE 25 G: 25 INJECTION, SOLUTION INTRAVENOUS at 23:46

## 2023-04-27 RX ADMIN — DEXTROSE 15 G: 15 GEL ORAL at 23:21

## 2023-04-27 RX ADMIN — HYDROMORPHONE HYDROCHLORIDE 0.5 MG: 1 INJECTION, SOLUTION INTRAMUSCULAR; INTRAVENOUS; SUBCUTANEOUS at 23:34

## 2023-04-27 RX ADMIN — ONDANSETRON 4 MG: 2 INJECTION INTRAMUSCULAR; INTRAVENOUS at 19:21

## 2023-04-27 RX ADMIN — PANTOPRAZOLE SODIUM 40 MG: 40 INJECTION, POWDER, FOR SOLUTION INTRAVENOUS at 19:24

## 2023-04-27 RX ADMIN — Medication 10 ML: at 23:22

## 2023-04-27 RX ADMIN — HYDROMORPHONE HYDROCHLORIDE 0.5 MG: 1 INJECTION, SOLUTION INTRAMUSCULAR; INTRAVENOUS; SUBCUTANEOUS at 19:24

## 2023-04-27 RX ADMIN — SODIUM CHLORIDE 1000 ML: 9 INJECTION, SOLUTION INTRAVENOUS at 17:36

## 2023-04-27 RX ADMIN — OCTREOTIDE ACETATE 50 MCG: 50 INJECTION, SOLUTION INTRAVENOUS; SUBCUTANEOUS at 22:07

## 2023-04-27 NOTE — OUTREACH NOTE
AMBULATORY CASE MANAGEMENT NOTE    Name and Relationship of Patient/Support Person:  -     Care Coordination    Discussed patient concerns with MSW as PCP had not addressed patient report of wanting LTC placement at his last appointment and face to face with  and orders pended to restart HH and LTC placement per support of Sandy RUELAS.            Yazmin NICOLE  Ambulatory Case Management    4/27/2023, 13:38 EDT
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
## 2023-04-27 NOTE — TELEPHONE ENCOUNTER
Chart reviewed and noted he saw hematology oncology today and had an ER visit 4/19/23 for mental status change UDS was + opiates, CT head normal, no meds given and instructed to f/u PCP 2 days, had an appointment for Dr. Crawford 4/20/23 but he cancelled. Heme/Onc f/u 3 months. IV Iron infusions and medication for rectal bleeding recommend scope and EGD and he has appt with GI today.

## 2023-04-27 NOTE — OUTREACH NOTE
[facility] End of Month Documentation    This Chronic Medical Management Care Plan for Nic Nicolas, 58 y.o. male, has been monitored and managed; reviewed; complete; revised and a new plan of care implemented for the month of April.  A cumulative time of 39  minutes was spent on this patient record this month, including face to face with provider; phone call with patient; face to face visit with patient; chart review; electronic communication with primary care provider.    Regarding the patient's problems: has Arthritis, senescent; Colon polyps; Liver cirrhosis secondary to ROMO (nonalcoholic steatohepatitis); Multiple episodes of deep venous thrombosis; High blood pressure; Hyperlipidemia; Other specified anemias; Sleep apnea; Systolic congestive heart failure; Bilateral lower extremity edema; Chronic cystitis; Chronic low back pain; Type 2 diabetes mellitus with hyperglycemia; Acid reflux; Acetabular fracture; Gross hematuria; Hesitancy of micturition; Gastrointestinal hemorrhage associated with peptic ulcer; Anxiety; Hepatic encephalopathy; Stroke; Amphetamine abuse; Class 3 severe obesity due to excess calories with serious comorbidity and body mass index (BMI) of 40.0 to 44.9 in adult; Hyperammonemia; Moderate episode of recurrent major depressive disorder; and Iron deficiency anemia due to chronic blood loss on their problem list., the following items were addressed: medical records; medications; referrals to community service providers, needs new pain management referral he reports and any changes can be found within the plan section of the note.  A detailed listing of time spent for chronic care management is tracked within each outreach encounter.  Current medications include:  has a current medication list which includes the following prescription(s): albuterol sulfate hfa, atorvastatin, buspirone, cetirizine, diphenhydramine, fluticasone, fluticasone, freestyle lite, furosemide, humalog kwikpen,  hydrocodone-acetaminophen, levemir flexpen, lactulose, freestyle, magnesium oxide, omeprazole, ondansetron odt, polyethyl glycol-propyl glycol, potassium chloride, rifaximin, ropinirole, sertraline, spironolactone, tranexamic acid, trazodone, vitamin d, and [DISCONTINUED] fluticasone. and the patient is reported to be patient is noncompliant with medication protocol, reports compliance and uses pill pack from pharmacy but may not always remember to take,  Medications are reported to be non-effective in controlling symptoms and changes have been made to the medication protocol, new medications with referrals.  Regarding these diagnoses, referrals were made to the following provider(s):  Hematology and Oncology and GI visits.  All notes on chart for PCP to review.    The patient was monitored remotely for weight; activity level; blood glucose; mood & behavior; medications, reports unable to care for himself any longer and wants LTC referral.    The patient's physical needs include:  physical healthcare; physician referral; help taking medications as prescribed; medication education, wants referral to LTC or assisted living.     The patient's mental support needs include:  coordination of community providers; increased support    The patient's cognitive support needs include:  coordination of community providers; increased support; needs assistance with ADLs; household care, referral for HH and LTC    The patient's psychosocial support needs include:  need for increased support; medication management or adherence; coordination of community providers    The patient's functional needs include: physical healthcare; health care coverage, PT referral    The patient's environmental needs include:  not applicable, none    Care Plan overall comments:  reviewed and updated and partially completed    Refer to previous outreach notes for more information on the areas listed above.    Monthly Billing Diagnoses  (K74.60,  R18.8)  Cirrhosis of liver with ascites, unspecified hepatic cirrhosis type    (K76.82) Hepatic encephalopathy (HCC)    (D50.0) Iron deficiency anemia due to chronic blood loss    (F41.9) Anxiety    Medications   · Medications have been reconciled    Care Plan progress this month:      Recently Modified Care Plans Updates made since 3/27/2023 12:00 AM     Fall Risk (Adult)         Problem Priority Last Modified     ELS FALL RISK (ADULT) --  11/10/2022  3:00 PM by Yazmin Rodriguez RN              Goal Recent Progress Last Modified     Absence of Fall and Fall-Related Injury Not on track  4/17/2023  3:59 PM by Yazmin Rodriguez, RN     Evidence-based guidance:   Assess fall risk using a validated tool when available. Consider balance and gait impairment, muscle weakness, diminished vision or hearing, environmental hazards, presence of urinary or bowel urgency and/or incontinence.   Communicate fall injury risk to interprofessional healthcare team.   Develop a fall prevention plan with the patient and family.   Promote use of personal vision and auditory aids.   Promote reorientation, appropriate sensory stimulation, and routines to decrease risk of fall when changes in mental status are present.   Assess assistance level required for safe and effective self-care; consider referral for home care.   Encourage physical activity, such as performance of self-care at highest level of ability, strength and balance exercise program, and provision of appropriate assistive devices; refer to rehabilitation therapy.   Refer to community-based fall prevention program where available.   If fall occurs, determine the cause and revise fall injury prevention plan.   Regularly review medication contribution to fall risk; consider risk related to polypharmacy and age.   Refer to pharmacist for consultation when concerns about medications are revealed.   Balance adequate pain management with potential for oversedation.   Provide guidance related to  environmental modifications.   Consider supplementation with Vitamin D.    Notes: 4/17 has had a few falls at home but no injuries, has cane and walker and is requesting to be sent to LTC for living there due to inability to care for self any longer. He is to discuss with PCP today.              Task Due Date Last Modified     Identify and Manage Contributors to Fall Risk --  4/17/2023  4:00 PM by Yazmin Rodriguez, USMAN     Care Management Activities:      - assistive or adaptive device use encouraged  - barriers to safety identified  - cognition assessed  - medication list reviewed  - participation in rehabilitation therapy encouraged      Notes: 3/22/23 DENIES ANY HOME ISSUES OR NEEDS AT THIS OUTREACH  4/17/23 he would like referral to NH in [place] for permanent placement                 Frailty Syndrome (Adult)         Problem Priority Last Modified     ELS DISEASE PROGRESSION (FRAILTY) (ADULT) --  11/10/2022  3:02 PM by Yazmin Rodriguez, USMAN              Goal Recent Progress Last Modified     Disease Progression Prevented or Minimized Not on track  4/17/2023  4:01 PM by Yazmin Rodriguez RN     Evidence-based guidance:   Anticipate referral to pharmacist for recognition of exposure to potentially inappropriate medication, such as aspirin, statin, antihypertensive, diuretic, bisphosphonate, analgesic, laxative and antidepressant, hypnotic.   Assess for presence of frailty indicators.   Develop individualized, interprofessional combination therapy plan that may slow progression of frailty.   Facilitate opportunity for shared decision-making regarding individualized medication de-prescribing plan; include plan for weaning when necessary.   Prevent infection by use of skin and hand hygiene, as well as oral and dental care; avoid aspiration and change position frequently; receive flu and pneumonia vaccines.   Review medication list for those that may contribute to the development of pneumonia, such as antipsychotic or  "sedative and those that cause dry mouth or achlorhydria.   Promote a regular daily exercise program based on tolerance, ability and patient choice that will support positive thinking about disease or aging process and reduce fear of falling.   Provide frequent encouragement and praise that supports positive view of aging; avoid use of term \"frailty\" that contributes to negative self-regard.    Notes: 4/17/23 reports he is declining phyeically and mentally and wants to live in assisted living or NH will d/w PCP today             Task Due Date Last Modified     Alleviate Barriers to Frailty Treatment --  4/17/2023  4:02 PM by Yazmin Rodriguez RN     Care Management Activities:      - activity or exercise based on tolerance encouraged  - communicable disease prevention promoted  - medication list reviewed      Notes:              Problem Priority Last Modified     ELS MALNUTRITION (FRAILTY) (ADULT) --  4/17/2023  4:03 PM by Yazmin Rodriguez RN              Goal Recent Progress Last Modified     Malnutrition Prevented --  4/17/2023  4:02 PM by Yazmin Rodriguez RN     Evidence-based guidance:   Anticipate supplementation with oral nutrition supplements if unable to meet energy and protein requirements through usual foods and beverages.   Anticipate supplementation with vitamin D2 or D3 and calcium when insufficiency is suspected or confirmed.   Complete a comprehensive nutrition assessment that includes the identification of macro and micro deficiencies, barriers to eating, cognition and mental status, as well as social determinants of health.   Mutually determine weight goal focusing on preservation or increase in lean body mass.   Provide guidance to caregiver regarding the need to offer feeding assistance in a way that is not demeaning and promotes as much independence as possible.   Provide ongoing encouragement and support as diet changes are made and supplements are added, including home visits and telephone calls.  "   Notes:              Task Due Date Last Modified     Optimize Nutrition Status --  4/17/2023  4:02 PM by Yazmin Rodriguez RN     Care Management Activities:      - family/caregiver support provided  - support and encouragement provided      Notes:                      · Current Specialty Plan of Care Status signed by both patient and provider    Instructions   · Patient was provided an electronic copy of care plan  · CCM services were explained and offered and patient has accepted these services.  · Patient has given their written consent to receive CCM services and understands that this includes the authorization of electronic communication of medical information with the other treating providers.  · Patient understands that they may stop CCM services at any time and these changes will be effective at the end of the calendar month and will effectively revocate the agreement of CCM services.  · Patient understands that only one practitioner can furnish and be paid for CCM services during one calendar month.  Patient also understands that there may be co-payment or deductible fees in association with CCM services.  · Patient will continue with at least monthly follow-up calls with the Ambulatory .    Yazmin NICOLE  Ambulatory Case Management    4/27/2023, 13:40 EDT

## 2023-04-27 NOTE — TELEPHONE ENCOUNTER
At [facility] visit when patient saw you this month he is requesting LTC placement so he can have help caring for himself and his sister is having her own problem and he just is ready to go live somewhere he can be taken care of. He reports he had a short stay at a facility in [place] last hospital stay and liked it there.     I discussed with MSW and plan would be to outreach to [facility] to do in home eval and get HH involved again and since he is in with heme/onc and forgetting medications etc I pended HH and [facility] evaluation orders to you.     Review orders pended and sign if agree.     TY

## 2023-04-28 ENCOUNTER — ANESTHESIA (OUTPATIENT)
Dept: GASTROENTEROLOGY | Facility: HOSPITAL | Age: 58
End: 2023-04-28
Payer: COMMERCIAL

## 2023-04-28 ENCOUNTER — ANESTHESIA EVENT (OUTPATIENT)
Dept: GASTROENTEROLOGY | Facility: HOSPITAL | Age: 58
End: 2023-04-28
Payer: COMMERCIAL

## 2023-04-28 ENCOUNTER — PREP FOR SURGERY (OUTPATIENT)
Dept: OTHER | Facility: HOSPITAL | Age: 58
End: 2023-04-28
Payer: COMMERCIAL

## 2023-04-28 DIAGNOSIS — K75.81 LIVER CIRRHOSIS SECONDARY TO NASH (NONALCOHOLIC STEATOHEPATITIS): Primary | Chronic | ICD-10-CM

## 2023-04-28 DIAGNOSIS — K74.60 LIVER CIRRHOSIS SECONDARY TO NASH (NONALCOHOLIC STEATOHEPATITIS): Primary | Chronic | ICD-10-CM

## 2023-04-28 LAB
ALBUMIN SERPL-MCNC: 3.3 G/DL (ref 3.5–5.2)
ALBUMIN SERPL-MCNC: 3.3 G/DL (ref 3.5–5.2)
ALBUMIN SERPL-MCNC: 3.5 G/DL (ref 3.5–5.2)
ALBUMIN/GLOB SERPL: 1.2 G/DL
ALBUMIN/GLOB SERPL: 1.2 G/DL
ALP SERPL-CCNC: 79 U/L (ref 39–117)
ALP SERPL-CCNC: 88 U/L (ref 39–117)
ALT SERPL W P-5'-P-CCNC: 17 U/L (ref 1–41)
ALT SERPL W P-5'-P-CCNC: 19 U/L (ref 1–41)
AMMONIA BLD-SCNC: 131 UMOL/L (ref 16–60)
ANION GAP SERPL CALCULATED.3IONS-SCNC: 10.6 MMOL/L (ref 5–15)
ANION GAP SERPL CALCULATED.3IONS-SCNC: 9.7 MMOL/L (ref 5–15)
ANION GAP SERPL CALCULATED.3IONS-SCNC: 9.7 MMOL/L (ref 5–15)
AST SERPL-CCNC: 30 U/L (ref 1–40)
AST SERPL-CCNC: 35 U/L (ref 1–40)
BASOPHILS # BLD AUTO: 0.05 10*3/MM3 (ref 0–0.2)
BASOPHILS NFR BLD AUTO: 0.8 % (ref 0–1.5)
BILIRUB SERPL-MCNC: 0.8 MG/DL (ref 0–1.2)
BILIRUB SERPL-MCNC: 0.9 MG/DL (ref 0–1.2)
BUN SERPL-MCNC: 22 MG/DL (ref 6–20)
BUN SERPL-MCNC: 22 MG/DL (ref 6–20)
BUN SERPL-MCNC: 25 MG/DL (ref 6–20)
BUN/CREAT SERPL: 15.9 (ref 7–25)
BUN/CREAT SERPL: 15.9 (ref 7–25)
BUN/CREAT SERPL: 17.1 (ref 7–25)
CALCIUM SPEC-SCNC: 8.4 MG/DL (ref 8.6–10.5)
CALCIUM SPEC-SCNC: 8.5 MG/DL (ref 8.6–10.5)
CALCIUM SPEC-SCNC: 8.5 MG/DL (ref 8.6–10.5)
CHLORIDE SERPL-SCNC: 105 MMOL/L (ref 98–107)
CO2 SERPL-SCNC: 17.4 MMOL/L (ref 22–29)
CO2 SERPL-SCNC: 18.3 MMOL/L (ref 22–29)
CO2 SERPL-SCNC: 18.3 MMOL/L (ref 22–29)
CREAT SERPL-MCNC: 1.38 MG/DL (ref 0.76–1.27)
CREAT SERPL-MCNC: 1.38 MG/DL (ref 0.76–1.27)
CREAT SERPL-MCNC: 1.46 MG/DL (ref 0.76–1.27)
D-LACTATE SERPL-SCNC: 1.7 MMOL/L (ref 0.5–2)
DEPRECATED RDW RBC AUTO: 50.8 FL (ref 37–54)
EGFRCR SERPLBLD CKD-EPI 2021: 55.7 ML/MIN/1.73
EGFRCR SERPLBLD CKD-EPI 2021: 59.6 ML/MIN/1.73
EGFRCR SERPLBLD CKD-EPI 2021: 59.6 ML/MIN/1.73
EOSINOPHIL # BLD AUTO: 0.17 10*3/MM3 (ref 0–0.4)
EOSINOPHIL NFR BLD AUTO: 2.8 % (ref 0.3–6.2)
ERYTHROCYTE [DISTWIDTH] IN BLOOD BY AUTOMATED COUNT: 17.3 % (ref 12.3–15.4)
GLOBULIN UR ELPH-MCNC: 2.8 GM/DL
GLOBULIN UR ELPH-MCNC: 3 GM/DL
GLUCOSE BLDC GLUCOMTR-MCNC: 116 MG/DL (ref 70–99)
GLUCOSE BLDC GLUCOMTR-MCNC: 116 MG/DL (ref 70–99)
GLUCOSE BLDC GLUCOMTR-MCNC: 162 MG/DL (ref 70–99)
GLUCOSE BLDC GLUCOMTR-MCNC: 50 MG/DL (ref 70–99)
GLUCOSE BLDC GLUCOMTR-MCNC: 56 MG/DL (ref 70–99)
GLUCOSE BLDC GLUCOMTR-MCNC: 91 MG/DL (ref 70–99)
GLUCOSE BLDC GLUCOMTR-MCNC: 97 MG/DL (ref 70–99)
GLUCOSE SERPL-MCNC: 115 MG/DL (ref 65–99)
GLUCOSE SERPL-MCNC: 115 MG/DL (ref 65–99)
GLUCOSE SERPL-MCNC: 96 MG/DL (ref 65–99)
HCT VFR BLD AUTO: 26.7 % (ref 37.5–51)
HGB BLD-MCNC: 8.2 G/DL (ref 13–17.7)
IMM GRANULOCYTES # BLD AUTO: 0.02 10*3/MM3 (ref 0–0.05)
IMM GRANULOCYTES NFR BLD AUTO: 0.3 % (ref 0–0.5)
INR PPP: 1.8 (ref 0.86–1.15)
LYMPHOCYTES # BLD AUTO: 0.98 10*3/MM3 (ref 0.7–3.1)
LYMPHOCYTES NFR BLD AUTO: 16.4 % (ref 19.6–45.3)
MAGNESIUM SERPL-MCNC: 1.8 MG/DL (ref 1.6–2.6)
MAGNESIUM SERPL-MCNC: 1.9 MG/DL (ref 1.6–2.6)
MCH RBC QN AUTO: 24.8 PG (ref 26.6–33)
MCHC RBC AUTO-ENTMCNC: 30.7 G/DL (ref 31.5–35.7)
MCV RBC AUTO: 80.9 FL (ref 79–97)
MONOCYTES # BLD AUTO: 0.58 10*3/MM3 (ref 0.1–0.9)
MONOCYTES NFR BLD AUTO: 9.7 % (ref 5–12)
NEUTROPHILS NFR BLD AUTO: 4.18 10*3/MM3 (ref 1.7–7)
NEUTROPHILS NFR BLD AUTO: 70 % (ref 42.7–76)
NRBC BLD AUTO-RTO: 0 /100 WBC (ref 0–0.2)
PHOSPHATE SERPL-MCNC: 3.7 MG/DL (ref 2.5–4.5)
PLATELET # BLD AUTO: 99 10*3/MM3 (ref 140–450)
PMV BLD AUTO: 10.2 FL (ref 6–12)
POTASSIUM SERPL-SCNC: 5.7 MMOL/L (ref 3.5–5.2)
POTASSIUM SERPL-SCNC: 5.7 MMOL/L (ref 3.5–5.2)
POTASSIUM SERPL-SCNC: 6 MMOL/L (ref 3.5–5.2)
PROT SERPL-MCNC: 6.1 G/DL (ref 6–8.5)
PROT SERPL-MCNC: 6.5 G/DL (ref 6–8.5)
PROTHROMBIN TIME: 20.8 SECONDS (ref 11.8–14.9)
RBC # BLD AUTO: 3.3 10*6/MM3 (ref 4.14–5.8)
SODIUM SERPL-SCNC: 133 MMOL/L (ref 136–145)
WBC NRBC COR # BLD: 5.98 10*3/MM3 (ref 3.4–10.8)

## 2023-04-28 PROCEDURE — 85025 COMPLETE CBC W/AUTO DIFF WBC: CPT | Performed by: INTERNAL MEDICINE

## 2023-04-28 PROCEDURE — 0 CEFTRIAXONE PER 250 MG: Performed by: INTERNAL MEDICINE

## 2023-04-28 PROCEDURE — 0DB78ZX EXCISION OF STOMACH, PYLORUS, VIA NATURAL OR ARTIFICIAL OPENING ENDOSCOPIC, DIAGNOSTIC: ICD-10-PCS | Performed by: INTERNAL MEDICINE

## 2023-04-28 PROCEDURE — 99222 1ST HOSP IP/OBS MODERATE 55: CPT | Performed by: INTERNAL MEDICINE

## 2023-04-28 PROCEDURE — 25010000002 CALCIUM GLUCONATE-NACL 1-0.675 GM/50ML-% SOLUTION: Performed by: INTERNAL MEDICINE

## 2023-04-28 PROCEDURE — 82140 ASSAY OF AMMONIA: CPT | Performed by: INTERNAL MEDICINE

## 2023-04-28 PROCEDURE — 25010000002 HYDROMORPHONE 1 MG/ML SOLUTION: Performed by: INTERNAL MEDICINE

## 2023-04-28 PROCEDURE — 85610 PROTHROMBIN TIME: CPT | Performed by: INTERNAL MEDICINE

## 2023-04-28 PROCEDURE — 88305 TISSUE EXAM BY PATHOLOGIST: CPT | Performed by: INTERNAL MEDICINE

## 2023-04-28 PROCEDURE — 80053 COMPREHEN METABOLIC PANEL: CPT | Performed by: INTERNAL MEDICINE

## 2023-04-28 PROCEDURE — 82948 REAGENT STRIP/BLOOD GLUCOSE: CPT

## 2023-04-28 PROCEDURE — 63710000001 INSULIN LISPRO (HUMAN) PER 5 UNITS: Performed by: INTERNAL MEDICINE

## 2023-04-28 PROCEDURE — 99233 SBSQ HOSP IP/OBS HIGH 50: CPT | Performed by: INTERNAL MEDICINE

## 2023-04-28 PROCEDURE — 25010000002 PROPOFOL 10 MG/ML EMULSION: Performed by: NURSE ANESTHETIST, CERTIFIED REGISTERED

## 2023-04-28 PROCEDURE — 83735 ASSAY OF MAGNESIUM: CPT | Performed by: INTERNAL MEDICINE

## 2023-04-28 PROCEDURE — 84100 ASSAY OF PHOSPHORUS: CPT | Performed by: INTERNAL MEDICINE

## 2023-04-28 PROCEDURE — 88342 IMHCHEM/IMCYTCHM 1ST ANTB: CPT | Performed by: INTERNAL MEDICINE

## 2023-04-28 PROCEDURE — 0DJ08ZZ INSPECTION OF UPPER INTESTINAL TRACT, VIA NATURAL OR ARTIFICIAL OPENING ENDOSCOPIC: ICD-10-PCS | Performed by: INTERNAL MEDICINE

## 2023-04-28 PROCEDURE — 43239 EGD BIOPSY SINGLE/MULTIPLE: CPT | Performed by: INTERNAL MEDICINE

## 2023-04-28 PROCEDURE — 83605 ASSAY OF LACTIC ACID: CPT | Performed by: INTERNAL MEDICINE

## 2023-04-28 PROCEDURE — 25010000002 OCTREOTIDE PER 25 MCG: Performed by: INTERNAL MEDICINE

## 2023-04-28 RX ORDER — DEXTROSE MONOHYDRATE 25 G/50ML
50 INJECTION, SOLUTION INTRAVENOUS ONCE
Status: COMPLETED | OUTPATIENT
Start: 2023-04-28 | End: 2023-04-28

## 2023-04-28 RX ORDER — LIDOCAINE HYDROCHLORIDE 20 MG/ML
INJECTION, SOLUTION EPIDURAL; INFILTRATION; INTRACAUDAL; PERINEURAL AS NEEDED
Status: DISCONTINUED | OUTPATIENT
Start: 2023-04-28 | End: 2023-04-28 | Stop reason: SURG

## 2023-04-28 RX ORDER — INSULIN LISPRO 100 [IU]/ML
5 INJECTION, SOLUTION INTRAVENOUS; SUBCUTANEOUS ONCE
Status: DISCONTINUED | OUTPATIENT
Start: 2023-04-28 | End: 2023-04-28

## 2023-04-28 RX ORDER — INSULIN LISPRO 100 [IU]/ML
5 INJECTION, SOLUTION INTRAVENOUS; SUBCUTANEOUS ONCE
Status: COMPLETED | OUTPATIENT
Start: 2023-04-28 | End: 2023-04-28

## 2023-04-28 RX ORDER — SODIUM CHLORIDE, SODIUM LACTATE, POTASSIUM CHLORIDE, CALCIUM CHLORIDE 600; 310; 30; 20 MG/100ML; MG/100ML; MG/100ML; MG/100ML
INJECTION, SOLUTION INTRAVENOUS CONTINUOUS PRN
Status: DISCONTINUED | OUTPATIENT
Start: 2023-04-28 | End: 2023-04-28 | Stop reason: SURG

## 2023-04-28 RX ORDER — PANTOPRAZOLE SODIUM 40 MG/1
40 TABLET, DELAYED RELEASE ORAL
Status: DISCONTINUED | OUTPATIENT
Start: 2023-04-29 | End: 2023-05-02 | Stop reason: HOSPADM

## 2023-04-28 RX ORDER — CALCIUM GLUCONATE 20 MG/ML
1 INJECTION, SOLUTION INTRAVENOUS ONCE
Status: COMPLETED | OUTPATIENT
Start: 2023-04-28 | End: 2023-04-28

## 2023-04-28 RX ORDER — CEFTRIAXONE SODIUM 2 G/50ML
2 INJECTION, SOLUTION INTRAVENOUS EVERY 24 HOURS
Status: COMPLETED | OUTPATIENT
Start: 2023-04-28 | End: 2023-05-02

## 2023-04-28 RX ORDER — PROPOFOL 10 MG/ML
VIAL (ML) INTRAVENOUS AS NEEDED
Status: DISCONTINUED | OUTPATIENT
Start: 2023-04-28 | End: 2023-04-28 | Stop reason: SURG

## 2023-04-28 RX ORDER — LACTULOSE 10 G/15ML
20 SOLUTION ORAL 2 TIMES DAILY
Status: DISCONTINUED | OUTPATIENT
Start: 2023-04-28 | End: 2023-04-29

## 2023-04-28 RX ORDER — SODIUM CHLORIDE 9 MG/ML
9 INJECTION, SOLUTION INTRAVENOUS CONTINUOUS
Status: DISCONTINUED | OUTPATIENT
Start: 2023-04-28 | End: 2023-04-29

## 2023-04-28 RX ORDER — SODIUM CHLORIDE 9 MG/ML
100 INJECTION, SOLUTION INTRAVENOUS CONTINUOUS
Status: ACTIVE | OUTPATIENT
Start: 2023-04-28 | End: 2023-04-29

## 2023-04-28 RX ADMIN — HYDROMORPHONE HYDROCHLORIDE 0.25 MG: 1 INJECTION, SOLUTION INTRAMUSCULAR; INTRAVENOUS; SUBCUTANEOUS at 20:32

## 2023-04-28 RX ADMIN — PROPOFOL 50 MG: 10 INJECTION, EMULSION INTRAVENOUS at 14:54

## 2023-04-28 RX ADMIN — SODIUM CHLORIDE 1000 ML: 9 INJECTION, SOLUTION INTRAVENOUS at 13:42

## 2023-04-28 RX ADMIN — PANTOPRAZOLE SODIUM 8 MG/HR: 40 INJECTION, POWDER, FOR SOLUTION INTRAVENOUS at 02:32

## 2023-04-28 RX ADMIN — SODIUM BICARBONATE 25 MEQ: 84 INJECTION INTRAVENOUS at 10:02

## 2023-04-28 RX ADMIN — PANTOPRAZOLE SODIUM 8 MG/HR: 40 INJECTION, POWDER, FOR SOLUTION INTRAVENOUS at 14:43

## 2023-04-28 RX ADMIN — CEFTRIAXONE SODIUM 2 G: 2 INJECTION, SOLUTION INTRAVENOUS at 09:04

## 2023-04-28 RX ADMIN — SODIUM CHLORIDE 125 ML/HR: 9 INJECTION, SOLUTION INTRAVENOUS at 06:10

## 2023-04-28 RX ADMIN — LACTULOSE 20 G: 20 SOLUTION ORAL at 20:31

## 2023-04-28 RX ADMIN — HYDROMORPHONE HYDROCHLORIDE 0.5 MG: 1 INJECTION, SOLUTION INTRAMUSCULAR; INTRAVENOUS; SUBCUTANEOUS at 02:26

## 2023-04-28 RX ADMIN — PROPOFOL 200 MCG/KG/MIN: 10 INJECTION, EMULSION INTRAVENOUS at 14:54

## 2023-04-28 RX ADMIN — Medication 10 ML: at 20:31

## 2023-04-28 RX ADMIN — SODIUM ZIRCONIUM CYCLOSILICATE 10 G: 10 POWDER, FOR SUSPENSION ORAL at 10:01

## 2023-04-28 RX ADMIN — SODIUM CHLORIDE 100 ML/HR: 9 INJECTION, SOLUTION INTRAVENOUS at 10:10

## 2023-04-28 RX ADMIN — CALCIUM GLUCONATE 1 G: 20 INJECTION, SOLUTION INTRAVENOUS at 10:02

## 2023-04-28 RX ADMIN — LIDOCAINE HYDROCHLORIDE 100 MG: 20 INJECTION, SOLUTION EPIDURAL; INFILTRATION; INTRACAUDAL; PERINEURAL at 14:54

## 2023-04-28 RX ADMIN — Medication 10 ML: at 16:21

## 2023-04-28 RX ADMIN — HYDROMORPHONE HYDROCHLORIDE 0.25 MG: 1 INJECTION, SOLUTION INTRAMUSCULAR; INTRAVENOUS; SUBCUTANEOUS at 16:20

## 2023-04-28 RX ADMIN — HYDROMORPHONE HYDROCHLORIDE 0.25 MG: 1 INJECTION, SOLUTION INTRAMUSCULAR; INTRAVENOUS; SUBCUTANEOUS at 12:33

## 2023-04-28 RX ADMIN — INSULIN LISPRO 5 UNITS: 100 INJECTION, SOLUTION INTRAVENOUS; SUBCUTANEOUS at 10:02

## 2023-04-28 RX ADMIN — HYDROMORPHONE HYDROCHLORIDE 0.5 MG: 1 INJECTION, SOLUTION INTRAMUSCULAR; INTRAVENOUS; SUBCUTANEOUS at 05:40

## 2023-04-28 RX ADMIN — HYDROMORPHONE HYDROCHLORIDE 0.5 MG: 1 INJECTION, SOLUTION INTRAMUSCULAR; INTRAVENOUS; SUBCUTANEOUS at 08:06

## 2023-04-28 RX ADMIN — OCTREOTIDE ACETATE 50 MCG/HR: 500 INJECTION, SOLUTION INTRAVENOUS; SUBCUTANEOUS at 05:41

## 2023-04-28 RX ADMIN — SODIUM CHLORIDE, POTASSIUM CHLORIDE, SODIUM LACTATE AND CALCIUM CHLORIDE: 600; 310; 30; 20 INJECTION, SOLUTION INTRAVENOUS at 14:53

## 2023-04-28 RX ADMIN — DEXTROSE MONOHYDRATE 50 ML: 25 INJECTION, SOLUTION INTRAVENOUS at 10:02
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
## 2023-04-28 NOTE — SIGNIFICANT NOTE
04/28/23 1322   Plan   Plan Patient reports lives alone and reports sister drives and assist with ADL's at times.   EGD planned for today.  Patient request like long term placement and reports recent decline in health.  Request [facility] in [place] as first choice as he has been there in the past.  Referrals sent.  Facesheet verified.  Will continue to monitor

## 2023-04-28 NOTE — ANESTHESIA POSTPROCEDURE EVALUATION
Patient: Nic Nicolas    Procedure Summary     Date: 04/28/23 Room / Location: [facility] ENDOSCOPY 1 / [facility] ENDOSCOPY    Anesthesia Start: 1453 Anesthesia Stop: 1518    Procedure: ESOPHAGOGASTRODUODENOSCOPY WITH BIOPSIES Diagnosis:       Liver cirrhosis secondary to ROMO (nonalcoholic steatohepatitis)      (Liver cirrhosis secondary to ROMO (nonalcoholic steatohepatitis) [K75.81, K74.60])    Surgeons: Karlos Roblero MD Provider: Reyes, Mirabelle, DO    Anesthesia Type: general ASA Status: 4 - Emergent          Anesthesia Type: general    Vitals  Vitals Value Taken Time   /50 04/28/23 1533   Temp 36.7 °C (98 °F) 04/28/23 1518   Pulse 72 04/28/23 1533   Resp 18 04/28/23 1533   SpO2 98 % 04/28/23 1533           Post Anesthesia Care and Evaluation    Patient location during evaluation: bedside  Patient participation: complete - patient participated  Level of consciousness: awake  Pain management: adequate    Airway patency: patent  PONV Status: controlled  Cardiovascular status: acceptable  Respiratory status: acceptable  Hydration status: acceptable

## 2023-04-28 NOTE — PROGRESS NOTES
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  Earlier today, the patient was following up at outpatient GI clinic (see clinic note in epic) following presentation to the emergency room on 2023 for AMS.     Interval Followup:   -- Patient's potassium was 6.0 this morning so we will treat him with calcium gluconate, bicarb, D50, 5 units of insulin, and Lokelma  -- Discussed case with gastroenterology and the plan will be to take him to EGD if his potassium comes down today  -- Started patient on 2 g of ceftriaxone due to concern for GI bleed in the face of portal hypertension  -- Discussed case with gastroenterology and they agree with the plan at this time  -- Patient may need colonoscopy in the beginning the the week  -- Continue Protonix and octreotide per GI    Review of Systems   All systems were reviewed and negative     Objective   Objective     Vitals:   Temp:  [97.2 °F (36.2 °C)-98.7 °F (37.1 °C)] 97.2 °F (36.2 °C)  Heart Rate:  [] 89  Resp:  [15-23] 18  BP: (128-163)/(60-84) 163/66  Physical Exam    Constitutional: Sleepy.  Answers questions appropriately   Eyes: Pupils equal, sclerae anicteric, no conjunctival injection   HENT: NCAT, mucous membranes moist   Neck: Supple, no thyromegaly, no lymphadenopathy, trachea midline   Respiratory: Coarse breath sounds   Cardiovascular: RRR, no murmurs, rubs, or gallops, palpable pedal pulses bilaterally   Gastrointestinal: Diffuse abdominal tenderness   Musculoskeletal: No bilateral ankle edema, no clubbing or cyanosis to extremities   Psychiatric: Appropriate affect,  cooperative   Neurologic: Oriented x 2-3, strength symmetric in all extremities, Cranial Nerves grossly intact to confrontation, speech clear   Skin: No rashes     Result Review    Result Review:  I have personally reviewed the results from the time of this admission to 4/28/2023 09:20 EDT and agree with these findings:  Bicarb is 17.4  Potassium 6.0  Creatinine 1.4:    Assessment & Plan   Assessment / Plan     Assessment/Plan  Bright red blood per rectum  Cirrhosis with portal hypertension and history of esophageal varices and coagulopathy and thrombocytopenia  Acute on chronic anemia  due to blood loss questionable  Acute kidney injury with concern for HRS with baseline creatinine between 0.9 and 1.1  Coagulopathy  Thrombocytopenia  Acute metabolic encephalopathy most likely due to mild hepatic encephalopathy  Hyperkalemia with potassium of 6.0  Nongap metabolic acidosis  Elevated lactate on admission that is improved    Plan:  -- Admit to hospital service  -- Consult gastroenterology  -- Plan for EGD today  -- Treat hyperkalemia with Lokelma, insulin, dextrose, bicarb, and calcium gluconate  -- Repeat renal function panel at noon today  -- Patient most likely need to stay through the weekend for possible colonoscopy earlier in the week pending follow-up get GI found on EGD  -- Continue octreotide and Protonix  -- We will also continue IV fluids for the next 24 hours due to acute kidney injury but watch volume status  -- Avoid nephrotoxic agents  -- Active type and screen  -- Start ceftriaxone for GI bleed and some portal hypertension      Discussed plan with RN.    Disposition:  -- Determines what is found on the EGD  -- May require colonoscopy on Monday depending on findings  -- Continue to address acute kidney injury and acute metabolic encephalopathy        DVT prophylaxis:  Hold anticoagulation due to concern for bleed    CODE STATUS:   Full code      Electronically signed by Daniel Berrios MD, 04/28/23, 9:20 AM  EDT.
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
## 2023-04-28 NOTE — PLAN OF CARE
Goal Outcome Evaluation:      Pt Aox4, medicated for pain x2 with relief noted. Had a hypoglycemic episode this shift and was given D50W  x1. BS now stable. Pt is NPO, plan for EGD today.

## 2023-04-29 LAB
ALBUMIN SERPL-MCNC: 2.9 G/DL (ref 3.5–5.2)
ALBUMIN/GLOB SERPL: 1.1 G/DL
ALP SERPL-CCNC: 79 U/L (ref 39–117)
ALT SERPL W P-5'-P-CCNC: 16 U/L (ref 1–41)
ANION GAP SERPL CALCULATED.3IONS-SCNC: 10.3 MMOL/L (ref 5–15)
AST SERPL-CCNC: 30 U/L (ref 1–40)
BASOPHILS # BLD AUTO: 0.03 10*3/MM3 (ref 0–0.2)
BASOPHILS NFR BLD AUTO: 0.9 % (ref 0–1.5)
BH BB BLOOD EXPIRATION DATE: NORMAL
BH BB BLOOD TYPE BARCODE: 6200
BH BB DISPENSE STATUS: NORMAL
BH BB PRODUCT CODE: NORMAL
BH BB UNIT NUMBER: NORMAL
BILIRUB SERPL-MCNC: 0.7 MG/DL (ref 0–1.2)
BUN SERPL-MCNC: 22 MG/DL (ref 6–20)
BUN/CREAT SERPL: 18.8 (ref 7–25)
CALCIUM SPEC-SCNC: 8.3 MG/DL (ref 8.6–10.5)
CHLORIDE SERPL-SCNC: 105 MMOL/L (ref 98–107)
CO2 SERPL-SCNC: 17.7 MMOL/L (ref 22–29)
CREAT SERPL-MCNC: 1.17 MG/DL (ref 0.76–1.27)
CROSSMATCH INTERPRETATION: NORMAL
DEPRECATED RDW RBC AUTO: 49.5 FL (ref 37–54)
EGFRCR SERPLBLD CKD-EPI 2021: 72.7 ML/MIN/1.73
EOSINOPHIL # BLD AUTO: 0.09 10*3/MM3 (ref 0–0.4)
EOSINOPHIL NFR BLD AUTO: 2.6 % (ref 0.3–6.2)
ERYTHROCYTE [DISTWIDTH] IN BLOOD BY AUTOMATED COUNT: 17.2 % (ref 12.3–15.4)
GLOBULIN UR ELPH-MCNC: 2.6 GM/DL
GLUCOSE BLDC GLUCOMTR-MCNC: 149 MG/DL (ref 70–99)
GLUCOSE BLDC GLUCOMTR-MCNC: 158 MG/DL (ref 70–99)
GLUCOSE BLDC GLUCOMTR-MCNC: 188 MG/DL (ref 70–99)
GLUCOSE BLDC GLUCOMTR-MCNC: 203 MG/DL (ref 70–99)
GLUCOSE BLDC GLUCOMTR-MCNC: 283 MG/DL (ref 70–99)
GLUCOSE SERPL-MCNC: 213 MG/DL (ref 65–99)
HCT VFR BLD AUTO: 22.6 % (ref 37.5–51)
HEMOCCULT STL QL IA: NEGATIVE
HGB BLD-MCNC: 7.2 G/DL (ref 13–17.7)
IMM GRANULOCYTES # BLD AUTO: 0.06 10*3/MM3 (ref 0–0.05)
IMM GRANULOCYTES NFR BLD AUTO: 1.7 % (ref 0–0.5)
LYMPHOCYTES # BLD AUTO: 0.69 10*3/MM3 (ref 0.7–3.1)
LYMPHOCYTES NFR BLD AUTO: 20 % (ref 19.6–45.3)
MAGNESIUM SERPL-MCNC: 1.7 MG/DL (ref 1.6–2.6)
MCH RBC QN AUTO: 25 PG (ref 26.6–33)
MCHC RBC AUTO-ENTMCNC: 31.9 G/DL (ref 31.5–35.7)
MCV RBC AUTO: 78.5 FL (ref 79–97)
MONOCYTES # BLD AUTO: 0.41 10*3/MM3 (ref 0.1–0.9)
MONOCYTES NFR BLD AUTO: 11.9 % (ref 5–12)
NEUTROPHILS NFR BLD AUTO: 2.17 10*3/MM3 (ref 1.7–7)
NEUTROPHILS NFR BLD AUTO: 62.9 % (ref 42.7–76)
NRBC BLD AUTO-RTO: 0 /100 WBC (ref 0–0.2)
PLATELET # BLD AUTO: 75 10*3/MM3 (ref 140–450)
PMV BLD AUTO: 10.4 FL (ref 6–12)
POTASSIUM SERPL-SCNC: 5.5 MMOL/L (ref 3.5–5.2)
PROT SERPL-MCNC: 5.5 G/DL (ref 6–8.5)
RBC # BLD AUTO: 2.88 10*6/MM3 (ref 4.14–5.8)
SODIUM SERPL-SCNC: 133 MMOL/L (ref 136–145)
UNIT  ABO: NORMAL
UNIT  RH: NORMAL
WBC NRBC COR # BLD: 3.45 10*3/MM3 (ref 3.4–10.8)

## 2023-04-29 PROCEDURE — 80053 COMPREHEN METABOLIC PANEL: CPT | Performed by: INTERNAL MEDICINE

## 2023-04-29 PROCEDURE — 99231 SBSQ HOSP IP/OBS SF/LOW 25: CPT | Performed by: INTERNAL MEDICINE

## 2023-04-29 PROCEDURE — 63710000001 INSULIN REGULAR HUMAN PER 5 UNITS: Performed by: INTERNAL MEDICINE

## 2023-04-29 PROCEDURE — 63710000001 DIPHENHYDRAMINE PER 50 MG: Performed by: FAMILY MEDICINE

## 2023-04-29 PROCEDURE — 83735 ASSAY OF MAGNESIUM: CPT | Performed by: INTERNAL MEDICINE

## 2023-04-29 PROCEDURE — 97161 PT EVAL LOW COMPLEX 20 MIN: CPT

## 2023-04-29 PROCEDURE — 25010000002 ONDANSETRON PER 1 MG: Performed by: INTERNAL MEDICINE

## 2023-04-29 PROCEDURE — 82274 ASSAY TEST FOR BLOOD FECAL: CPT | Performed by: INTERNAL MEDICINE

## 2023-04-29 PROCEDURE — 82948 REAGENT STRIP/BLOOD GLUCOSE: CPT

## 2023-04-29 PROCEDURE — 99233 SBSQ HOSP IP/OBS HIGH 50: CPT | Performed by: FAMILY MEDICINE

## 2023-04-29 PROCEDURE — 25010000002 HYDROMORPHONE 1 MG/ML SOLUTION: Performed by: INTERNAL MEDICINE

## 2023-04-29 PROCEDURE — 85025 COMPLETE CBC W/AUTO DIFF WBC: CPT | Performed by: INTERNAL MEDICINE

## 2023-04-29 PROCEDURE — 0 CEFTRIAXONE PER 250 MG: Performed by: INTERNAL MEDICINE

## 2023-04-29 RX ORDER — DIPHENHYDRAMINE HCL 25 MG
25 CAPSULE ORAL EVERY 6 HOURS PRN
Status: DISCONTINUED | OUTPATIENT
Start: 2023-04-29 | End: 2023-05-02 | Stop reason: HOSPADM

## 2023-04-29 RX ORDER — HYDROCODONE BITARTRATE AND ACETAMINOPHEN 10; 325 MG/1; MG/1
1 TABLET ORAL EVERY 12 HOURS PRN
Status: DISCONTINUED | OUTPATIENT
Start: 2023-04-29 | End: 2023-04-29

## 2023-04-29 RX ORDER — TRAZODONE HYDROCHLORIDE 50 MG/1
50 TABLET ORAL NIGHTLY PRN
Status: DISCONTINUED | OUTPATIENT
Start: 2023-04-29 | End: 2023-05-02 | Stop reason: HOSPADM

## 2023-04-29 RX ORDER — ATORVASTATIN CALCIUM 40 MG/1
40 TABLET, FILM COATED ORAL NIGHTLY
Status: DISCONTINUED | OUTPATIENT
Start: 2023-04-29 | End: 2023-05-02 | Stop reason: HOSPADM

## 2023-04-29 RX ORDER — LACTULOSE 10 G/15ML
30 SOLUTION ORAL
Status: DISCONTINUED | OUTPATIENT
Start: 2023-04-29 | End: 2023-05-02 | Stop reason: HOSPADM

## 2023-04-29 RX ORDER — ROPINIROLE 1 MG/1
1 TABLET, FILM COATED ORAL NIGHTLY
Status: DISCONTINUED | OUTPATIENT
Start: 2023-04-29 | End: 2023-05-02 | Stop reason: HOSPADM

## 2023-04-29 RX ORDER — FUROSEMIDE 40 MG/1
40 TABLET ORAL DAILY
Status: DISCONTINUED | OUTPATIENT
Start: 2023-04-29 | End: 2023-05-02 | Stop reason: HOSPADM

## 2023-04-29 RX ORDER — LEVALBUTEROL INHALATION SOLUTION 1.25 MG/3ML
1.25 SOLUTION RESPIRATORY (INHALATION) EVERY 6 HOURS PRN
Status: DISCONTINUED | OUTPATIENT
Start: 2023-04-29 | End: 2023-05-02 | Stop reason: HOSPADM

## 2023-04-29 RX ORDER — CETIRIZINE HYDROCHLORIDE 10 MG/1
10 TABLET ORAL DAILY
Status: DISCONTINUED | OUTPATIENT
Start: 2023-04-30 | End: 2023-05-02 | Stop reason: HOSPADM

## 2023-04-29 RX ORDER — OXYCODONE HYDROCHLORIDE 5 MG/1
5 TABLET ORAL EVERY 4 HOURS PRN
Status: DISCONTINUED | OUTPATIENT
Start: 2023-04-29 | End: 2023-05-02 | Stop reason: HOSPADM

## 2023-04-29 RX ORDER — OXYCODONE HYDROCHLORIDE 5 MG/1
10 TABLET ORAL EVERY 4 HOURS PRN
Status: DISCONTINUED | OUTPATIENT
Start: 2023-04-29 | End: 2023-05-02 | Stop reason: HOSPADM

## 2023-04-29 RX ORDER — BUSPIRONE HYDROCHLORIDE 7.5 MG/1
7.5 TABLET ORAL 3 TIMES DAILY
Status: DISCONTINUED | OUTPATIENT
Start: 2023-04-29 | End: 2023-05-02 | Stop reason: HOSPADM

## 2023-04-29 RX ORDER — SERTRALINE HYDROCHLORIDE 100 MG/1
100 TABLET, FILM COATED ORAL NIGHTLY
Status: DISCONTINUED | OUTPATIENT
Start: 2023-04-29 | End: 2023-05-02 | Stop reason: HOSPADM

## 2023-04-29 RX ADMIN — ONDANSETRON 4 MG: 2 INJECTION INTRAMUSCULAR; INTRAVENOUS at 07:56

## 2023-04-29 RX ADMIN — LACTULOSE 30 G: 20 SOLUTION ORAL at 17:47

## 2023-04-29 RX ADMIN — OXYCODONE 10 MG: 5 TABLET ORAL at 19:29

## 2023-04-29 RX ADMIN — DIPHENHYDRAMINE HYDROCHLORIDE 25 MG: 25 CAPSULE ORAL at 20:45

## 2023-04-29 RX ADMIN — HYDROMORPHONE HYDROCHLORIDE 0.25 MG: 1 INJECTION, SOLUTION INTRAMUSCULAR; INTRAVENOUS; SUBCUTANEOUS at 09:08

## 2023-04-29 RX ADMIN — HYDROCODONE BITARTRATE AND ACETAMINOPHEN 1 TABLET: 10; 325 TABLET ORAL at 12:41

## 2023-04-29 RX ADMIN — PANTOPRAZOLE SODIUM 40 MG: 40 TABLET, DELAYED RELEASE ORAL at 05:53

## 2023-04-29 RX ADMIN — INSULIN HUMAN 4 UNITS: 100 INJECTION, SOLUTION PARENTERAL at 17:47

## 2023-04-29 RX ADMIN — Medication 10 ML: at 20:46

## 2023-04-29 RX ADMIN — INSULIN HUMAN 3 UNITS: 100 INJECTION, SOLUTION PARENTERAL at 00:10

## 2023-04-29 RX ADMIN — SERTRALINE HYDROCHLORIDE 100 MG: 100 TABLET ORAL at 20:45

## 2023-04-29 RX ADMIN — FUROSEMIDE 40 MG: 40 TABLET ORAL at 12:41

## 2023-04-29 RX ADMIN — BUSPIRONE HYDROCHLORIDE 7.5 MG: 7.5 TABLET ORAL at 12:41

## 2023-04-29 RX ADMIN — RIFAXIMIN 550 MG: 550 TABLET ORAL at 20:45

## 2023-04-29 RX ADMIN — INSULIN HUMAN 2 UNITS: 100 INJECTION, SOLUTION PARENTERAL at 12:41

## 2023-04-29 RX ADMIN — LACTULOSE 20 G: 20 SOLUTION ORAL at 09:08

## 2023-04-29 RX ADMIN — Medication 10 ML: at 08:04

## 2023-04-29 RX ADMIN — HYDROMORPHONE HYDROCHLORIDE 0.25 MG: 1 INJECTION, SOLUTION INTRAMUSCULAR; INTRAVENOUS; SUBCUTANEOUS at 04:16

## 2023-04-29 RX ADMIN — ROPINIROLE HYDROCHLORIDE 1 MG: 1 TABLET, FILM COATED ORAL at 20:46

## 2023-04-29 RX ADMIN — BUSPIRONE HYDROCHLORIDE 7.5 MG: 7.5 TABLET ORAL at 15:21

## 2023-04-29 RX ADMIN — ATORVASTATIN CALCIUM 40 MG: 40 TABLET, FILM COATED ORAL at 20:46

## 2023-04-29 RX ADMIN — HYDROMORPHONE HYDROCHLORIDE 0.25 MG: 1 INJECTION, SOLUTION INTRAMUSCULAR; INTRAVENOUS; SUBCUTANEOUS at 00:10

## 2023-04-29 RX ADMIN — OXYCODONE 10 MG: 5 TABLET ORAL at 23:56

## 2023-04-29 RX ADMIN — BUSPIRONE HYDROCHLORIDE 7.5 MG: 7.5 TABLET ORAL at 20:46

## 2023-04-29 RX ADMIN — CEFTRIAXONE SODIUM 2 G: 2 INJECTION, SOLUTION INTRAVENOUS at 07:56

## 2023-04-29 RX ADMIN — OXYCODONE 10 MG: 5 TABLET ORAL at 15:21

## 2023-04-29 RX ADMIN — INSULIN HUMAN 2 UNITS: 100 INJECTION, SOLUTION PARENTERAL at 05:56

## 2023-04-29 NOTE — PLAN OF CARE
Goal Outcome Evaluation:  Plan of Care Reviewed With: patient        Progress: improving  Outcome Evaluation: Patient presents with no physical limitations that impede his ability to return home independently or with assist from his sister as needed.  Patient encouraged to continue ambulating as tolerated in the room and will be discharged from physical therapy caseload.
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
## 2023-04-29 NOTE — PROGRESS NOTES
[facility]  Inpatient Progress Note    Reason for Follow Up:  anemia    Subjective     Interval History:   Pt reports diffuse abd discomfort/bloating.  No nausea, vomiting.  He reports dark stools.    Current Facility-Administered Medications:   •  cefTRIAXone (ROCEPHIN) IVPB 2 g, 2 g, Intravenous, Q24H, Karlos Roblero MD, Last Rate: 100 mL/hr at 04/29/23 0756, 2 g at 04/29/23 0756  •  dextrose (D50W) (25 g/50 mL) IV injection 25 g, 25 g, Intravenous, Q15 Min PRN, Karlos Roblero MD, 25 g at 04/27/23 2346  •  dextrose (GLUTOSE) oral gel 15 g, 15 g, Oral, Q15 Min PRN, Karlos Roblero MD, 15 g at 04/27/23 2321  •  glucagon (GLUCAGEN) injection 1 mg, 1 mg, Intramuscular, Q15 Min PRN, Karlos Roblero MD  •  HYDROmorphone (DILAUDID) injection 0.25 mg, 0.25 mg, Intravenous, Q4H PRN, Karlos Roblero MD, 0.25 mg at 04/29/23 0416  •  insulin regular (humuLIN R,novoLIN R) injection 2-7 Units, 2-7 Units, Subcutaneous, Q6H, Karlos Roblero MD, 2 Units at 04/29/23 0556  •  lactulose (CHRONULAC) 10 GM/15ML solution 20 g, 20 g, Oral, BID, Karlos Roblero MD, 20 g at 04/28/23 2031  •  ondansetron (ZOFRAN) injection 4 mg, 4 mg, Intravenous, Q6H PRN, Karlos Roblero MD, 4 mg at 04/29/23 0756  •  pantoprazole (PROTONIX) EC tablet 40 mg, 40 mg, Oral, Q AM, Karlos Roblero MD, 40 mg at 04/29/23 0553  •  sodium chloride 0.9 % flush 10 mL, 10 mL, Intravenous, PRN, Karlos Roblero MD  •  sodium chloride 0.9 % flush 10 mL, 10 mL, Intravenous, PRN, Karlos Roblero MD  •  sodium chloride 0.9 % flush 10 mL, 10 mL, Intravenous, PRN, Karlos Roblero MD, 10 mL at 04/28/23 1621  •  sodium chloride 0.9 % flush 10 mL, 10 mL, Intravenous, Q12H, Karlos Roblero MD, 10 mL at 04/29/23 0804  •  sodium chloride 0.9 % infusion 40 mL, 40 mL, Intravenous, PRN, Karlos Roblero MD  •  sodium chloride 0.9 % infusion 9 mL, 9 mL,  Intravenous, Continuous, Reyes, Mirabelle, DO, 1,000 mL at 04/28/23 1342  •  sodium chloride 0.9 % infusion, 100 mL/hr, Intravenous, Continuous, Daniel Berrios MD, Last Rate: 100 mL/hr at 04/28/23 1010, 100 mL/hr at 04/28/23 1010  Review of Systems:    The following systems were reviewed and negative;  constitution, respiratory and cardiovascular    Objective     Vital Signs  Temp:  [97.5 °F (36.4 °C)-98.5 °F (36.9 °C)] 97.5 °F (36.4 °C)  Heart Rate:  [72-92] 73  Resp:  [16-24] 18  BP: (103-153)/(45-90) 144/67  Body mass index is 41.43 kg/m².    Intake/Output Summary (Last 24 hours) at 4/29/2023 0809  Last data filed at 4/29/2023 0800  Gross per 24 hour   Intake 1229.02 ml   Output 2225 ml   Net -995.98 ml     I/O this shift:  In: 240 [P.O.:240]  Out: 700 [Urine:700]     Physical Exam:   General: awake, alert and in no acute distress   Eyes: eyes move symmetrical in all directions, no scleral icterus   Neck: supple, trachea is midline   Skin: warm and dry, not jaundiced   Cardiovascular: no chest tenderness   Pulm: breathing unlabored   Abdomen: soft, diffuse TTP, nondistended   Rectal: deferred   Extremities: no rash or edema   Psychiatric: mental status within normal limits     Results Review:     I reviewed the patient's new clinical results.    Results from last 7 days   Lab Units 04/29/23  0413 04/28/23  0522 04/27/23  1545   WBC 10*3/mm3 3.45 5.98 4.48   HEMOGLOBIN g/dL 7.2* 8.2* 7.5*   HEMATOCRIT % 22.6* 26.7* 23.5*   PLATELETS 10*3/mm3 75* 99* 95*     Results from last 7 days   Lab Units 04/29/23  0413 04/28/23  1144 04/28/23  0522   SODIUM mmol/L 133* 133*  133* 133*   POTASSIUM mmol/L 5.5* 5.7*  5.7* 6.0*   CHLORIDE mmol/L 105 105  105 105   CO2 mmol/L 17.7* 18.3*  18.3* 17.4*   BUN mg/dL 22* 22*  22* 25*   CREATININE mg/dL 1.17 1.38*  1.38* 1.46*   CALCIUM mg/dL 8.3* 8.5*  8.5* 8.4*   BILIRUBIN mg/dL 0.7 0.8 0.9   ALK PHOS U/L 79 79 88   ALT (SGPT) U/L 16 17 19   AST (SGOT) U/L 30 30 35   GLUCOSE  mg/dL 213* 115*  115* 96     Results from last 7 days   Lab Units 04/28/23  0522 04/27/23  1545   INR  1.80* 1.91*     Lab Results   Lab Value Date/Time    LIPASE 63 (H) 03/24/2023 1040    LIPASE 38 02/05/2023 1532    LIPASE 40 12/18/2022 0238    LIPASE 55 11/04/2022 1731    LIPASE 68 (H) 10/31/2022 0924    LIPASE 52 06/27/2022 1449    LIPASE 20 08/03/2021 1206    LIPASE 28 06/01/2021 1622       Radiology:              Assessment & Plan   Assessment:     Anemia  Rectal bleeding  ROMO cirrhosis  Thrombocytopenia    Plan:     - MELD-Na 19  - EGD yesterday without any significant findings  - Hgb downward trending  - if Hgb continues to decrease, may need to consider further evaluation with colonoscopy; colonoscopy reportedly normal 2 years ago    I discussed the patients findings and my recommendations with patient.         Trena Coombs M.D.  [place] [address]  [place]  [address]  Office: [phone]

## 2023-04-29 NOTE — PLAN OF CARE
Goal Outcome Evaluation:pt with significant pain, improved with oxycodone 10 mg.  No other complaints today.  Occult blood negative.  Will monitor.

## 2023-04-29 NOTE — PROGRESS NOTES
[facility]   Hospitalist Progress Note  Date: 2023  Patient Name: Nic Nicolas  : 1965  MRN: [id number]  Date of admission: 2023      Subjective   Subjective     Chief Complaint: Follow-up bright red blood per rectum    Summary:Nic Nicolas is a 58 y.o. male morbidly obese man with cirrhosis due to NADLF, DM, BREE, chronic anemia, and tardive dyskinesia (for unclear reason) presents with report of BRBPR and hematemesis and associated abdominal pain.  Patient presented the emergency department for further evaluation and treatment.  Hemoglobin found to be 7.5 down from 8.5.  Creatinine found to be elevated 1.62 up from baseline of 1.13.  Potassium elevated at 5.5.  INR elevated at 1.91.  CT of the abdomen demonstrated varices suggesting portal venous hypertension.  Patient started on octreotide and Protonix drip.  Gastroenterology consulted.  EGD performed without obvious signs of bleeding.  GI planning for flex sig on Monday if hemoglobin continues to trend down or bright red blood per rectum recurs.    Interval Followup: Patient sitting up in bed appears to be resting comfortably.  Patient complaining of abdominal pain back and neck pain.  States back and neck pain are chronic.  Patient currently requiring IV analgesics.  Patient denies any blood in stools.  No further hematemesis.  Hemoglobin trending down.  Sinus rhythm 80s to 110 on telemetry review.  No other issues per nursing.    Review of Systems  Constitutional: Negative for fatigue and fever.   HENT: Negative for sore throat and trouble swallowing.    Eyes: Negative for pain and discharge.   Respiratory: Negative for cough and shortness of breath.    Cardiovascular: Negative for chest pain and palpitations.   Gastrointestinal: Positive for abdominal pain, negative nausea and vomiting.   Endocrine: Negative for cold intolerance and heat intolerance.   Genitourinary: Negative for difficulty urinating and dysuria.    Musculoskeletal: Positive for back pain and negative neck stiffness.   Skin: Negative for color change and rash.   Neurological: Negative for syncope and headaches.   Hematological: Negative for adenopathy.   Psychiatric/Behavioral: Negative for confusion and hallucinations.    Objective   Objective     Vitals:   Temp:  [97.5 °F (36.4 °C)-98.3 °F (36.8 °C)] 97.9 °F (36.6 °C)  Heart Rate:  [71-92] 91  Resp:  [16-20] 18  BP: (120-160)/(46-90) 160/65  Physical Exam   Gen. well-developed appearing stated age in no acute distress  HEENT: Normocephalic atraumatic moist membranes pupils equal round reactive light, no scleral icterus no conjunctival injection  Cardiovascular: regular rate and rhythm no murmurs rubs or gallops S1-S2, no lower extremity edema appreciated  Pulmonary: Clear to auscultation bilaterally no wheezes rales or rhonchi symmetric chest expansion, unlabored, no conversational dyspnea appreciated  Gastrointestinal: Soft nontender nondistended positive bowel sounds all 4 quadrants no rebound or guarding  Musculoskeletal: No clubbing cyanosis, warm and well-perfused, calves soft symmetric nontender bilaterally  Skin: Clean dry without rashs  Neuro: Cranial nerves II through XII intact grossly no sensorimotor deficits appreciated bilateral upper and lower extremities  Psych: Patient is calm cooperative and appropriate with exam not responding to internal stimuli  : No Zamora catheter no bladder distention no suprapubic tenderness    Result Review    Result Review:  I have personally reviewed these results and agree with these findings:  [x]  Laboratory  LAB RESULTS:      Lab 04/29/23  0413 04/28/23  0814 04/28/23  0522 04/27/23  1931 04/27/23  1545   WBC 3.45  --  5.98  --  4.48   HEMOGLOBIN 7.2*  --  8.2*  --  7.5*   HEMATOCRIT 22.6*  --  26.7*  --  23.5*   PLATELETS 75*  --  99*  --  95*   NEUTROS ABS 2.17  --  4.18  --  3.09   IMMATURE GRANS (ABS) 0.06*  --  0.02  --  0.01   LYMPHS ABS 0.69*  --  0.98   --  0.72   MONOS ABS 0.41  --  0.58  --  0.50   EOS ABS 0.09  --  0.17  --  0.13   MCV 78.5*  --  80.9  --  77.3*   PROCALCITONIN  --   --   --   --  0.22   LACTATE  --  1.7  --  1.3 2.1*   PROTIME  --   --  20.8*  --  21.7*   APTT  --   --   --   --  35.8*         Lab 04/29/23  0413 04/28/23  1144 04/28/23 0522 04/27/23  1545   SODIUM 133* 133*  133* 133* 129*   POTASSIUM 5.5* 5.7*  5.7* 6.0* 5.5*   CHLORIDE 105 105  105 105 101   CO2 17.7* 18.3*  18.3* 17.4* 16.4*   ANION GAP 10.3 9.7  9.7 10.6 11.6   BUN 22* 22*  22* 25* 27*   CREATININE 1.17 1.38*  1.38* 1.46* 1.62*   EGFR 72.7 59.6*  59.6* 55.7* 49.2*   GLUCOSE 213* 115*  115* 96 277*   CALCIUM 8.3* 8.5*  8.5* 8.4* 8.5*   MAGNESIUM 1.7 1.8 1.9 1.9   PHOSPHORUS  --  3.7  --   --          Lab 04/29/23 0413 04/28/23  1144 04/28/23 0522 04/27/23  1545   TOTAL PROTEIN 5.5* 6.1 6.5 6.4   ALBUMIN 2.9* 3.3*  3.3* 3.5 3.5   GLOBULIN 2.6 2.8 3.0 2.9   ALT (SGPT) 16 17 19 18   AST (SGOT) 30 30 35 30   BILIRUBIN 0.7 0.8 0.9 0.8   ALK PHOS 79 79 88 88         Lab 04/28/23 0522 04/27/23  1545   PROTIME 20.8* 21.7*   INR 1.80* 1.91*             Lab 04/27/23  1545   ABO TYPING A   RH TYPING Positive   ANTIBODY SCREEN Negative         Brief Urine Lab Results  (Last result in the past 365 days)      Color   Clarity   Blood   Leuk Est   Nitrite   Protein   CREAT   Urine HCG        04/19/23 2201 Dark Yellow   Clear   Small (1+)   Trace   Negative   Negative               Microbiology Results (last 10 days)     Procedure Component Value - Date/Time    Blood Culture - Blood, Arm, Right [[id number]]  (Normal) Collected: 04/27/23 1931    Lab Status: Preliminary result Specimen: Blood from Arm, Right Updated: 04/28/23 1945     Blood Culture No growth at 24 hours    Blood Culture - Blood, Arm, Right [[id number]]  (Normal) Collected: 04/27/23 1545    Lab Status: Preliminary result Specimen: Blood from Arm, Right Updated: 04/29/23 1600     Blood Culture No growth at 2 days           [x]  Microbiology  [x]  Radiology  CT Abdomen Pelvis With Contrast    Result Date: 4/27/2023    1. No source of GI bleeding identified. 2. Cirrhosis, splenomegaly and varices suggesting portal venous hypertension. 3. Small fat and fluid containing supraumbilical midline ventral hernia. 4. Evidence of left-sided pelvic ORIF/reconstruction. 5. Moderate thoracolumbar degenerative changes.     CR CONTRERAS MD       Electronically Signed and Approved By: CR CONTRERAS MD on 4/27/2023 at 20:30               [x]  EKG/Telemetry   []  Cardiology/Vascular   []  Pathology  []  Old records  [x]  Other:  Scheduled Meds:atorvastatin, 40 mg, Oral, Nightly  busPIRone, 7.5 mg, Oral, TID  cefTRIAXone, 2 g, Intravenous, Q24H  [START ON 4/30/2023] cetirizine, 10 mg, Oral, Daily  furosemide, 40 mg, Oral, Daily  insulin regular, 2-7 Units, Subcutaneous, Q6H  lactulose, 30 g, Oral, TID With Meals  [START ON 4/30/2023] magnesium oxide, 400 mg, Oral, Daily  pantoprazole, 40 mg, Oral, Q AM  riFAXIMin, 550 mg, Oral, Q12H  rOPINIRole, 1 mg, Oral, Nightly  sertraline, 100 mg, Oral, Nightly  sodium chloride, 10 mL, Intravenous, Q12H      Continuous Infusions:sodium chloride, 9 mL      PRN Meds:.•  dextrose  •  dextrose  •  diphenhydrAMINE  •  glucagon (human recombinant)  •  HYDROmorphone  •  levalbuterol  •  ondansetron  •  oxyCODONE  •  oxyCODONE  •  sodium chloride  •  sodium chloride  •  sodium chloride  •  sodium chloride  •  traZODone      Assessment & Plan   Assessment / Plan     Assessment/Plan:  Bright red blood per rectum  Cirrhosis with portal hypertension and history of esophageal varices and coagulopathy and thrombocytopenia  Acute on chronic anemia  due to blood loss   Acute kidney injury with concern for HRS with baseline creatinine between 0.9 and 1.1  Coagulopathy  Thrombocytopenia  Acute metabolic encephalopathy most likely due to mild hepatic encephalopathy  Hyperkalemia with potassium of 6.0  [place] metabolic  acidosis  Elevated lactate on admission that is improved     Plan:  Admit to hospital service  Neurology consulted today for assistance  EGD negative for any acute signs of bleeding  Restart home furosemide  To monitor electrolytes and renal function closely  Continue replace electrolytes as needed  If hemoglobin continues to trend down or red blood per rectum were to recur patient will likely need colonoscopy versus flex sig on Monday  Continue Protonix p.o. daily  DC IV fluids  Continue to Avoid nephrotoxic agents  Continue Start ceftriaxone for GI bleed and some portal hypertension        Discussed plan with RN.     Disposition: Discharge planning working on long-term placement           DVT prophylaxis:  Mechanical DVT prophylaxis orders are present.    CODE STATUS:   Code Status (Patient has no pulse and is not breathing): CPR (Attempt to Resuscitate)  Medical Interventions (Patient has pulse or is breathing): Full Support

## 2023-04-30 LAB
ALBUMIN SERPL-MCNC: 3.1 G/DL (ref 3.5–5.2)
ANION GAP SERPL CALCULATED.3IONS-SCNC: 7.5 MMOL/L (ref 5–15)
BUN SERPL-MCNC: 20 MG/DL (ref 6–20)
BUN/CREAT SERPL: 16.8 (ref 7–25)
CALCIUM SPEC-SCNC: 8.4 MG/DL (ref 8.6–10.5)
CHLORIDE SERPL-SCNC: 103 MMOL/L (ref 98–107)
CO2 SERPL-SCNC: 21.5 MMOL/L (ref 22–29)
CREAT SERPL-MCNC: 1.19 MG/DL (ref 0.76–1.27)
DEPRECATED RDW RBC AUTO: 50.4 FL (ref 37–54)
EGFRCR SERPLBLD CKD-EPI 2021: 71.2 ML/MIN/1.73
ERYTHROCYTE [DISTWIDTH] IN BLOOD BY AUTOMATED COUNT: 17.3 % (ref 12.3–15.4)
GLUCOSE BLDC GLUCOMTR-MCNC: 137 MG/DL (ref 70–99)
GLUCOSE BLDC GLUCOMTR-MCNC: 172 MG/DL (ref 70–99)
GLUCOSE BLDC GLUCOMTR-MCNC: 250 MG/DL (ref 70–99)
GLUCOSE BLDC GLUCOMTR-MCNC: 498 MG/DL (ref 70–99)
GLUCOSE SERPL-MCNC: 220 MG/DL (ref 65–99)
HCT VFR BLD AUTO: 23.3 % (ref 37.5–51)
HGB BLD-MCNC: 7.1 G/DL (ref 13–17.7)
IRON 24H UR-MRATE: 33 MCG/DL (ref 59–158)
IRON SATN MFR SERPL: 7 % (ref 20–50)
MCH RBC QN AUTO: 24.3 PG (ref 26.6–33)
MCHC RBC AUTO-ENTMCNC: 30.5 G/DL (ref 31.5–35.7)
MCV RBC AUTO: 79.8 FL (ref 79–97)
PHOSPHATE SERPL-MCNC: 3.7 MG/DL (ref 2.5–4.5)
PLATELET # BLD AUTO: 79 10*3/MM3 (ref 140–450)
PMV BLD AUTO: 10.4 FL (ref 6–12)
POTASSIUM SERPL-SCNC: 4.8 MMOL/L (ref 3.5–5.2)
RBC # BLD AUTO: 2.92 10*6/MM3 (ref 4.14–5.8)
SODIUM SERPL-SCNC: 132 MMOL/L (ref 136–145)
TIBC SERPL-MCNC: 441 MCG/DL (ref 298–536)
TRANSFERRIN SERPL-MCNC: 296 MG/DL (ref 200–360)
WBC NRBC COR # BLD: 3.18 10*3/MM3 (ref 3.4–10.8)

## 2023-04-30 PROCEDURE — 63710000001 INSULIN DETEMIR PER 5 UNITS: Performed by: FAMILY MEDICINE

## 2023-04-30 PROCEDURE — 84466 ASSAY OF TRANSFERRIN: CPT | Performed by: FAMILY MEDICINE

## 2023-04-30 PROCEDURE — 63710000001 INSULIN REGULAR HUMAN PER 5 UNITS: Performed by: FAMILY MEDICINE

## 2023-04-30 PROCEDURE — 63710000001 INSULIN LISPRO (HUMAN) PER 5 UNITS: Performed by: FAMILY MEDICINE

## 2023-04-30 PROCEDURE — 25010000002 ONDANSETRON PER 1 MG: Performed by: INTERNAL MEDICINE

## 2023-04-30 PROCEDURE — 82948 REAGENT STRIP/BLOOD GLUCOSE: CPT

## 2023-04-30 PROCEDURE — 83540 ASSAY OF IRON: CPT | Performed by: FAMILY MEDICINE

## 2023-04-30 PROCEDURE — 85027 COMPLETE CBC AUTOMATED: CPT | Performed by: FAMILY MEDICINE

## 2023-04-30 PROCEDURE — 80069 RENAL FUNCTION PANEL: CPT | Performed by: FAMILY MEDICINE

## 2023-04-30 PROCEDURE — 99233 SBSQ HOSP IP/OBS HIGH 50: CPT | Performed by: FAMILY MEDICINE

## 2023-04-30 PROCEDURE — 63710000001 DIPHENHYDRAMINE PER 50 MG: Performed by: FAMILY MEDICINE

## 2023-04-30 PROCEDURE — 0 CEFTRIAXONE PER 250 MG: Performed by: INTERNAL MEDICINE

## 2023-04-30 RX ORDER — DEXTROSE MONOHYDRATE 25 G/50ML
25 INJECTION, SOLUTION INTRAVENOUS
Status: DISCONTINUED | OUTPATIENT
Start: 2023-04-30 | End: 2023-05-02 | Stop reason: HOSPADM

## 2023-04-30 RX ORDER — INSULIN LISPRO 100 [IU]/ML
2-7 INJECTION, SOLUTION INTRAVENOUS; SUBCUTANEOUS
Status: DISCONTINUED | OUTPATIENT
Start: 2023-04-30 | End: 2023-05-02 | Stop reason: HOSPADM

## 2023-04-30 RX ORDER — NICOTINE POLACRILEX 4 MG
15 LOZENGE BUCCAL
Status: DISCONTINUED | OUTPATIENT
Start: 2023-04-30 | End: 2023-05-02 | Stop reason: HOSPADM

## 2023-04-30 RX ADMIN — INSULIN HUMAN 2 UNITS: 100 INJECTION, SOLUTION PARENTERAL at 08:53

## 2023-04-30 RX ADMIN — OXYCODONE 10 MG: 5 TABLET ORAL at 20:59

## 2023-04-30 RX ADMIN — RIFAXIMIN 550 MG: 550 TABLET ORAL at 08:54

## 2023-04-30 RX ADMIN — BUSPIRONE HYDROCHLORIDE 7.5 MG: 7.5 TABLET ORAL at 15:51

## 2023-04-30 RX ADMIN — INSULIN DETEMIR 8 UNITS: 100 INJECTION, SOLUTION SUBCUTANEOUS at 08:54

## 2023-04-30 RX ADMIN — PANTOPRAZOLE SODIUM 40 MG: 40 TABLET, DELAYED RELEASE ORAL at 05:28

## 2023-04-30 RX ADMIN — ROPINIROLE HYDROCHLORIDE 1 MG: 1 TABLET, FILM COATED ORAL at 21:00

## 2023-04-30 RX ADMIN — OXYCODONE 10 MG: 5 TABLET ORAL at 08:55

## 2023-04-30 RX ADMIN — INSULIN LISPRO 7 UNITS: 100 INJECTION, SOLUTION INTRAVENOUS; SUBCUTANEOUS at 12:52

## 2023-04-30 RX ADMIN — BUSPIRONE HYDROCHLORIDE 7.5 MG: 7.5 TABLET ORAL at 20:59

## 2023-04-30 RX ADMIN — OXYCODONE 10 MG: 5 TABLET ORAL at 16:58

## 2023-04-30 RX ADMIN — LACTULOSE 30 G: 20 SOLUTION ORAL at 13:04

## 2023-04-30 RX ADMIN — CETIRIZINE HYDROCHLORIDE 10 MG: 10 TABLET, FILM COATED ORAL at 08:54

## 2023-04-30 RX ADMIN — LACTULOSE 30 G: 20 SOLUTION ORAL at 08:52

## 2023-04-30 RX ADMIN — OXYCODONE 10 MG: 5 TABLET ORAL at 12:52

## 2023-04-30 RX ADMIN — Medication 10 ML: at 08:53

## 2023-04-30 RX ADMIN — ONDANSETRON 4 MG: 2 INJECTION INTRAMUSCULAR; INTRAVENOUS at 22:56

## 2023-04-30 RX ADMIN — LACTULOSE 30 G: 20 SOLUTION ORAL at 17:34

## 2023-04-30 RX ADMIN — SERTRALINE HYDROCHLORIDE 100 MG: 100 TABLET ORAL at 21:00

## 2023-04-30 RX ADMIN — DIPHENHYDRAMINE HYDROCHLORIDE 25 MG: 25 CAPSULE ORAL at 21:00

## 2023-04-30 RX ADMIN — ATORVASTATIN CALCIUM 40 MG: 40 TABLET, FILM COATED ORAL at 21:00

## 2023-04-30 RX ADMIN — OXYCODONE 10 MG: 5 TABLET ORAL at 03:57

## 2023-04-30 RX ADMIN — RIFAXIMIN 550 MG: 550 TABLET ORAL at 20:59

## 2023-04-30 RX ADMIN — FUROSEMIDE 40 MG: 40 TABLET ORAL at 08:54

## 2023-04-30 RX ADMIN — MAGNESIUM OXIDE TAB 400 MG (241.3 MG ELEMENTAL MG) 400 MG: 400 (241.3 MG) TAB at 08:54

## 2023-04-30 RX ADMIN — ONDANSETRON 4 MG: 2 INJECTION INTRAMUSCULAR; INTRAVENOUS at 15:51

## 2023-04-30 RX ADMIN — CEFTRIAXONE SODIUM 2 G: 2 INJECTION, SOLUTION INTRAVENOUS at 08:54

## 2023-04-30 RX ADMIN — Medication 10 ML: at 20:59

## 2023-04-30 RX ADMIN — POLYETHYLENE GLYCOL 3350, SODIUM SULFATE ANHYDROUS, SODIUM BICARBONATE, SODIUM CHLORIDE, POTASSIUM CHLORIDE 4000 ML: 236; 22.74; 6.74; 5.86; 2.97 POWDER, FOR SOLUTION ORAL at 16:59

## 2023-04-30 RX ADMIN — BUSPIRONE HYDROCHLORIDE 7.5 MG: 7.5 TABLET ORAL at 08:55

## 2023-04-30 NOTE — PLAN OF CARE
Goal Outcome Evaluation: pt medicated q4h for pain with oxycodone, marginally effective.  Starting GI prep for procedure tomorrow.

## 2023-04-30 NOTE — PROGRESS NOTES
[facility]   Hospitalist Progress Note  Date: 2023  Patient Name: Nic Nicolas  : 1965  MRN: [id number]  Date of admission: 2023      Subjective   Subjective     Chief Complaint: Follow-up bright red blood per rectum    Summary:Nic Nicolas is a 58 y.o. male morbidly obese man with cirrhosis due to NADLF, DM, BREE, chronic anemia, and tardive dyskinesia (for unclear reason) presents with report of BRBPR and hematemesis and associated abdominal pain.  Patient presented the emergency department for further evaluation and treatment.  Hemoglobin found to be 7.5 down from 8.5.  Creatinine found to be elevated 1.62 up from baseline of 1.13.  Potassium elevated at 5.5.  INR elevated at 1.91.  CT of the abdomen demonstrated varices suggesting portal venous hypertension.  Patient started on octreotide and Protonix drip.  Gastroenterology consulted.  EGD performed without obvious signs of bleeding.  GI planning for flex sig on Monday if hemoglobin continues to trend down or bright red blood per rectum recurs.    Interval Followup: Patient sitting up in bed appears to be resting comfortably.  Patient states pain is improved.  Patient states he is passing black stools.  No further hematemesis.  Hemoglobin trending down slightly to 7.1.  Sinus rhythm 80s to 130 on telemetry review.  No other issues per nursing.    Review of Systems  Constitutional: Negative for fatigue and fever.   HENT: Negative for sore throat and trouble swallowing.    Eyes: Negative for pain and discharge.   Respiratory: Negative for cough and shortness of breath.    Cardiovascular: Negative for chest pain and palpitations.   Gastrointestinal: Positive for abdominal pain, negative nausea and vomiting.   Endocrine: Negative for cold intolerance and heat intolerance.   Genitourinary: Negative for difficulty urinating and dysuria.   Musculoskeletal: Positive for back pain and negative neck stiffness.   Skin: Negative for color  change and rash.   Neurological: Negative for syncope and headaches.   Hematological: Negative for adenopathy.   Psychiatric/Behavioral: Negative for confusion and hallucinations.    Objective   Objective     Vitals:   Temp:  [97.7 °F (36.5 °C)-98.2 °F (36.8 °C)] 98.2 °F (36.8 °C)  Heart Rate:  [] 89  Resp:  [18-24] 20  BP: (134-190)/(59-76) 134/59  Physical Exam   Gen. well-developed appearing stated age in no acute distress  HEENT: Normocephalic atraumatic moist membranes pupils equal round reactive light, no scleral icterus no conjunctival injection  Cardiovascular: regular rate and rhythm no murmurs rubs or gallops S1-S2, no lower extremity edema appreciated  Pulmonary: Clear to auscultation bilaterally no wheezes rales or rhonchi symmetric chest expansion, unlabored, no conversational dyspnea appreciated  Gastrointestinal: Soft nontender nondistended positive bowel sounds all 4 quadrants no rebound or guarding  Musculoskeletal: No clubbing cyanosis, warm and well-perfused, calves soft symmetric nontender bilaterally  Skin: Clean dry without rashs  Neuro: Cranial nerves II through XII intact grossly no sensorimotor deficits appreciated bilateral upper and lower extremities  Psych: Patient is calm cooperative and appropriate with exam not responding to internal stimuli  : No Zamora catheter no bladder distention no suprapubic tenderness    Result Review    Result Review:  I have personally reviewed these results and agree with these findings:  [x]  Laboratory  LAB RESULTS:      Lab 04/30/23  0426 04/29/23  0413 04/28/23  0814 04/28/23  0522 04/27/23  1931 04/27/23  1545   WBC 3.18* 3.45  --  5.98  --  4.48   HEMOGLOBIN 7.1* 7.2*  --  8.2*  --  7.5*   HEMATOCRIT 23.3* 22.6*  --  26.7*  --  23.5*   PLATELETS 79* 75*  --  99*  --  95*   NEUTROS ABS  --  2.17  --  4.18  --  3.09   IMMATURE GRANS (ABS)  --  0.06*  --  0.02  --  0.01   LYMPHS ABS  --  0.69*  --  0.98  --  0.72   MONOS ABS  --  0.41  --  0.58  --   0.50   EOS ABS  --  0.09  --  0.17  --  0.13   MCV 79.8 78.5*  --  80.9  --  77.3*   PROCALCITONIN  --   --   --   --   --  0.22   LACTATE  --   --  1.7  --  1.3 2.1*   PROTIME  --   --   --  20.8*  --  21.7*   APTT  --   --   --   --   --  35.8*         Lab 04/30/23 0426 04/29/23 0413 04/28/23 1144 04/28/23 0522 04/27/23  1545   SODIUM 132* 133* 133*  133* 133* 129*   POTASSIUM 4.8 5.5* 5.7*  5.7* 6.0* 5.5*   CHLORIDE 103 105 105  105 105 101   CO2 21.5* 17.7* 18.3*  18.3* 17.4* 16.4*   ANION GAP 7.5 10.3 9.7  9.7 10.6 11.6   BUN 20 22* 22*  22* 25* 27*   CREATININE 1.19 1.17 1.38*  1.38* 1.46* 1.62*   EGFR 71.2 72.7 59.6*  59.6* 55.7* 49.2*   GLUCOSE 220* 213* 115*  115* 96 277*   CALCIUM 8.4* 8.3* 8.5*  8.5* 8.4* 8.5*   MAGNESIUM  --  1.7 1.8 1.9 1.9   PHOSPHORUS 3.7  --  3.7  --   --          Lab 04/30/23 0426 04/29/23 0413 04/28/23 1144 04/28/23 0522 04/27/23  1545   TOTAL PROTEIN  --  5.5* 6.1 6.5 6.4   ALBUMIN 3.1* 2.9* 3.3*  3.3* 3.5 3.5   GLOBULIN  --  2.6 2.8 3.0 2.9   ALT (SGPT)  --  16 17 19 18   AST (SGOT)  --  30 30 35 30   BILIRUBIN  --  0.7 0.8 0.9 0.8   ALK PHOS  --  79 79 88 88         Lab 04/28/23 0522 04/27/23  1545   PROTIME 20.8* 21.7*   INR 1.80* 1.91*             Lab 04/27/23  1545   ABO TYPING A   RH TYPING Positive   ANTIBODY SCREEN Negative         Brief Urine Lab Results  (Last result in the past 365 days)      Color   Clarity   Blood   Leuk Est   Nitrite   Protein   CREAT   Urine HCG        04/19/23 2201 Dark Yellow   Clear   Small (1+)   Trace   Negative   Negative               Microbiology Results (last 10 days)     Procedure Component Value - Date/Time    Blood Culture - Blood, Arm, Right [[id number]]  (Normal) Collected: 04/27/23 1931    Lab Status: Preliminary result Specimen: Blood from Arm, Right Updated: 04/29/23 1945     Blood Culture No growth at 2 days    Blood Culture - Blood, Arm, Right [[id number]]  (Normal) Collected: 04/27/23 1545    Lab Status:  Preliminary result Specimen: Blood from Arm, Right Updated: 04/29/23 1600     Blood Culture No growth at 2 days          [x]  Microbiology  [x]  Radiology  CT Abdomen Pelvis With Contrast    Result Date: 4/27/2023    1. No source of GI bleeding identified. 2. Cirrhosis, splenomegaly and varices suggesting portal venous hypertension. 3. Small fat and fluid containing supraumbilical midline ventral hernia. 4. Evidence of left-sided pelvic ORIF/reconstruction. 5. Moderate thoracolumbar degenerative changes.     CR CONTRERAS MD       Electronically Signed and Approved By: CR CONTRERAS MD on 4/27/2023 at 20:30               [x]  EKG/Telemetry   []  Cardiology/Vascular   []  Pathology  []  Old records  [x]  Other:  Scheduled Meds:atorvastatin, 40 mg, Oral, Nightly  busPIRone, 7.5 mg, Oral, TID  cefTRIAXone, 2 g, Intravenous, Q24H  cetirizine, 10 mg, Oral, Daily  furosemide, 40 mg, Oral, Daily  insulin detemir, 8 Units, Subcutaneous, Daily  insulin lispro, 2-7 Units, Subcutaneous, TID With Meals  lactulose, 30 g, Oral, TID With Meals  magnesium oxide, 400 mg, Oral, Daily  pantoprazole, 40 mg, Oral, Q AM  polyethylene glycol, 4,000 mL, Oral, Once  riFAXIMin, 550 mg, Oral, Q12H  rOPINIRole, 1 mg, Oral, Nightly  sertraline, 100 mg, Oral, Nightly  sodium chloride, 10 mL, Intravenous, Q12H      Continuous Infusions:   PRN Meds:.•  dextrose  •  dextrose  •  diphenhydrAMINE  •  glucagon (human recombinant)  •  levalbuterol  •  ondansetron  •  oxyCODONE  •  oxyCODONE  •  sodium chloride  •  sodium chloride  •  sodium chloride  •  sodium chloride  •  traZODone      Assessment & Plan   Assessment / Plan     Assessment/Plan:  Bright red blood per rectum  Cirrhosis with portal hypertension and history of esophageal varices and coagulopathy and thrombocytopenia  Acute on chronic anemia  due to blood loss   Acute kidney injury with concern for HRS with baseline creatinine between 0.9 and 1.1  Coagulopathy  Thrombocytopenia  Acute  metabolic encephalopathy most likely due to mild hepatic encephalopathy  Hyperkalemia with potassium of 6.0  [place]ong[place] metabolic acidosis  Elevated lactate on admission that is improved     Plan:  Admit to hospital service  Neurology consulted today for assistance  EGD negative for any acute signs of bleeding  Continue home furosemide  Continue to monitor electrolytes and renal function closely  Continue replace electrolytes as needed  Plan for flex sig vs colonoscopy per GI tomorrow  Prep Per GI  Start on clears  Continue Protonix p.o. daily  DC IV fluids  Continue to Avoid nephrotoxic agents  Continue ceftriaxone for GI bleed and some portal hypertension  Continue SSI  Start Levemir daily and titrate as needed  Further inpatient orders and recommendations pending clinical course        Discussed plan with RN as well as GI attending.     Disposition: Discharge planning working on long-term placement vs assisted living.            DVT prophylaxis:  Mechanical DVT prophylaxis orders are present.    CODE STATUS:   Code Status (Patient has no pulse and is not breathing): CPR (Attempt to Resuscitate)  Medical Interventions (Patient has pulse or is breathing): Full Support

## 2023-05-01 ENCOUNTER — ANESTHESIA (OUTPATIENT)
Dept: GASTROENTEROLOGY | Facility: HOSPITAL | Age: 58
End: 2023-05-01
Payer: COMMERCIAL

## 2023-05-01 ENCOUNTER — TELEPHONE (OUTPATIENT)
Dept: CASE MANAGEMENT | Facility: OTHER | Age: 58
End: 2023-05-01
Payer: COMMERCIAL

## 2023-05-01 ENCOUNTER — ANESTHESIA EVENT (OUTPATIENT)
Dept: GASTROENTEROLOGY | Facility: HOSPITAL | Age: 58
End: 2023-05-01
Payer: COMMERCIAL

## 2023-05-01 LAB
GLUCOSE BLDC GLUCOMTR-MCNC: 159 MG/DL (ref 70–99)
GLUCOSE BLDC GLUCOMTR-MCNC: 184 MG/DL (ref 70–99)
GLUCOSE BLDC GLUCOMTR-MCNC: 190 MG/DL (ref 70–99)
GLUCOSE BLDC GLUCOMTR-MCNC: 207 MG/DL (ref 70–99)
HCT VFR BLD AUTO: 24.7 % (ref 37.5–51)
HGB BLD-MCNC: 7.8 G/DL (ref 13–17.7)

## 2023-05-01 PROCEDURE — 25010000002 DICYCLOMINE PER 20 MG

## 2023-05-01 PROCEDURE — 97165 OT EVAL LOW COMPLEX 30 MIN: CPT

## 2023-05-01 PROCEDURE — G0463 HOSPITAL OUTPT CLINIC VISIT: HCPCS | Performed by: INTERNAL MEDICINE

## 2023-05-01 PROCEDURE — 0 CEFTRIAXONE PER 250 MG: Performed by: INTERNAL MEDICINE

## 2023-05-01 PROCEDURE — 82948 REAGENT STRIP/BLOOD GLUCOSE: CPT

## 2023-05-01 PROCEDURE — 85018 HEMOGLOBIN: CPT | Performed by: FAMILY MEDICINE

## 2023-05-01 PROCEDURE — 85014 HEMATOCRIT: CPT | Performed by: FAMILY MEDICINE

## 2023-05-01 PROCEDURE — 63710000001 INSULIN LISPRO (HUMAN) PER 5 UNITS: Performed by: FAMILY MEDICINE

## 2023-05-01 PROCEDURE — 25010000002 ONDANSETRON PER 1 MG: Performed by: INTERNAL MEDICINE

## 2023-05-01 PROCEDURE — 99233 SBSQ HOSP IP/OBS HIGH 50: CPT | Performed by: FAMILY MEDICINE

## 2023-05-01 PROCEDURE — 99232 SBSQ HOSP IP/OBS MODERATE 35: CPT | Performed by: INTERNAL MEDICINE

## 2023-05-01 RX ORDER — SODIUM CHLORIDE, SODIUM LACTATE, POTASSIUM CHLORIDE, CALCIUM CHLORIDE 600; 310; 30; 20 MG/100ML; MG/100ML; MG/100ML; MG/100ML
30 INJECTION, SOLUTION INTRAVENOUS CONTINUOUS
Status: DISCONTINUED | OUTPATIENT
Start: 2023-05-01 | End: 2023-05-01

## 2023-05-01 RX ORDER — DICYCLOMINE HYDROCHLORIDE 10 MG/ML
10 INJECTION INTRAMUSCULAR ONCE AS NEEDED
Status: COMPLETED | OUTPATIENT
Start: 2023-05-01 | End: 2023-05-01

## 2023-05-01 RX ADMIN — OXYCODONE 10 MG: 5 TABLET ORAL at 12:59

## 2023-05-01 RX ADMIN — Medication 10 ML: at 09:10

## 2023-05-01 RX ADMIN — OXYCODONE 10 MG: 5 TABLET ORAL at 05:11

## 2023-05-01 RX ADMIN — ATORVASTATIN CALCIUM 40 MG: 40 TABLET, FILM COATED ORAL at 20:36

## 2023-05-01 RX ADMIN — LACTULOSE 30 G: 20 SOLUTION ORAL at 17:24

## 2023-05-01 RX ADMIN — BUSPIRONE HYDROCHLORIDE 7.5 MG: 7.5 TABLET ORAL at 15:59

## 2023-05-01 RX ADMIN — OXYCODONE 10 MG: 5 TABLET ORAL at 01:01

## 2023-05-01 RX ADMIN — PANTOPRAZOLE SODIUM 40 MG: 40 TABLET, DELAYED RELEASE ORAL at 05:11

## 2023-05-01 RX ADMIN — DICYCLOMINE HYDROCHLORIDE 10 MG: 20 INJECTION, SOLUTION INTRAMUSCULAR at 04:05

## 2023-05-01 RX ADMIN — CEFTRIAXONE SODIUM 2 G: 2 INJECTION, SOLUTION INTRAVENOUS at 09:10

## 2023-05-01 RX ADMIN — RIFAXIMIN 550 MG: 550 TABLET ORAL at 20:36

## 2023-05-01 RX ADMIN — OXYCODONE 10 MG: 5 TABLET ORAL at 17:24

## 2023-05-01 RX ADMIN — OXYCODONE 10 MG: 5 TABLET ORAL at 23:45

## 2023-05-01 RX ADMIN — ONDANSETRON 4 MG: 2 INJECTION INTRAMUSCULAR; INTRAVENOUS at 15:59

## 2023-05-01 RX ADMIN — BUSPIRONE HYDROCHLORIDE 7.5 MG: 7.5 TABLET ORAL at 09:11

## 2023-05-01 RX ADMIN — SERTRALINE HYDROCHLORIDE 100 MG: 100 TABLET ORAL at 20:37

## 2023-05-01 RX ADMIN — FUROSEMIDE 40 MG: 40 TABLET ORAL at 09:10

## 2023-05-01 RX ADMIN — CETIRIZINE HYDROCHLORIDE 10 MG: 10 TABLET, FILM COATED ORAL at 09:10

## 2023-05-01 RX ADMIN — ROPINIROLE HYDROCHLORIDE 1 MG: 1 TABLET, FILM COATED ORAL at 20:37

## 2023-05-01 RX ADMIN — BUSPIRONE HYDROCHLORIDE 7.5 MG: 7.5 TABLET ORAL at 20:36

## 2023-05-01 RX ADMIN — INSULIN LISPRO 2 UNITS: 100 INJECTION, SOLUTION INTRAVENOUS; SUBCUTANEOUS at 17:28

## 2023-05-01 RX ADMIN — ONDANSETRON 4 MG: 2 INJECTION INTRAMUSCULAR; INTRAVENOUS at 07:52

## 2023-05-01 RX ADMIN — Medication 10 ML: at 20:36

## 2023-05-01 RX ADMIN — OXYCODONE 10 MG: 5 TABLET ORAL at 09:10

## 2023-05-01 NOTE — SIGNIFICANT NOTE
05/01/23 1145   Coping/Psychosocial   Observed Emotional State anxious   Verbalized Emotional State grief   Trust Relationship/Rapport empathic listening provided   Involvement in Care interacting with patient   Additional Documentation Spiritual Care (Group)   Spiritual Care   Use of Spiritual Resources non-Church use of spiritual care   Spiritual Care Source  initiative   Spiritual Care Follow-Up follow-up, none required as presently assessed   Response to Spiritual Care receptive of support   Spiritual Care Interventions supportive conversation provided   Spiritual Care Visit Type initial   Receptivity to Spiritual Care visit welcomed

## 2023-05-01 NOTE — PLAN OF CARE
Goal Outcome Evaluation:  Plan of Care Reviewed With: patient        Progress: improving  Outcome Evaluation: Patient presents with no limitations at this time and reports that no additional therapy services are needed.  He is currently at a supervision level with all self-care and functional transfers.  Patient discharged from OT services at this time.
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
## 2023-05-01 NOTE — NURSING NOTE
Patient refusing to finish GI prep for Colonoscopy. Educated patient on importance of finishing prep and that it may have to be repeated again tonight. Patient says he is not going to complete the prep and wants something for GI cramping. PA notified. Orders noted.

## 2023-05-01 NOTE — PROGRESS NOTES
[facility]   Hospitalist Progress Note  Date: 2023  Patient Name: Nic Nicolas  : 1965  MRN: [id number]  Date of admission: 2023      Subjective   Subjective     Chief Complaint: Follow-up bright red blood per rectum    Summary:Nic Nicolas is a 58 y.o. male morbidly obese man with cirrhosis due to NADLF, DM, BREE, chronic anemia, and tardive dyskinesia (for unclear reason) presents with report of BRBPR and hematemesis and associated abdominal pain.  Patient presented the emergency department for further evaluation and treatment.  Hemoglobin found to be 7.5 down from 8.5.  Creatinine found to be elevated 1.62 up from baseline of 1.13.  Potassium elevated at 5.5.  INR elevated at 1.91.  CT of the abdomen demonstrated varices suggesting portal venous hypertension.  Patient started on octreotide and Protonix drip.  Gastroenterology consulted.  EGD performed without obvious signs of bleeding.  GI planning for colonoscopy on 2023 but patient could not tolerate prep.  Discussed with gastroenterology.  If patient hemoglobin remained stable can discharge home on 2023.  Patient initially requesting to discharge to [facility] for long-term skilled nursing though patient working well physical therapy occupational therapy [facility] 23-24.  Can likely discharge home if hemoglobin remains stable 23..    Interval Followup: Patient laying in bed appears to be resting comfortably.  Patient states he had increasing abdominal cramping associated with starting bowel prep last night.  Patient unable to complete prep.  Patient complaining of constipation.  Patient states he is passing black stools.  No further hematemesis.  Hemoglobin trending up.  Sinus rhythm 80s to 130 on telemetry review.  No other issues per nursing.    Review of Systems  Constitutional: Negative for fatigue and fever.   HENT: Negative for sore throat and trouble swallowing.    Eyes: Negative for pain and discharge.    Respiratory: Negative for cough and shortness of breath.    Cardiovascular: Negative for chest pain and palpitations.   Gastrointestinal: Positive for abdominal pain, negative nausea and vomiting.   Endocrine: Negative for cold intolerance and heat intolerance.   Genitourinary: Negative for difficulty urinating and dysuria.   Musculoskeletal: Positive for back pain and negative neck stiffness.   Skin: Negative for color change and rash.   Neurological: Negative for syncope and headaches.   Hematological: Negative for adenopathy.   Psychiatric/Behavioral: Negative for confusion and hallucinations.    Objective   Objective     Vitals:   Temp:  [97.5 °F (36.4 °C)-98.6 °F (37 °C)] 98.1 °F (36.7 °C)  Heart Rate:  [85-95] 85  Resp:  [18-20] 18  BP: (117-160)/(31-74) 147/66  Physical Exam   Gen. well-developed appearing stated age in no acute distress  HEENT: Normocephalic atraumatic moist membranes pupils equal round reactive light, no scleral icterus no conjunctival injection  Cardiovascular: regular rate and rhythm no murmurs rubs or gallops S1-S2, no lower extremity edema appreciated  Pulmonary: Clear to auscultation bilaterally no wheezes rales or rhonchi symmetric chest expansion, unlabored, no conversational dyspnea appreciated  Gastrointestinal: Soft nontender distended positive bowel sounds all 4 quadrants no rebound or guarding  Musculoskeletal: No clubbing cyanosis, warm and well-perfused, calves soft symmetric nontender bilaterally  Skin: Clean dry without rashs  Neuro: Cranial nerves II through XII intact grossly no sensorimotor deficits appreciated bilateral upper and lower extremities  Psych: Patient is calm cooperative and appropriate with exam not responding to internal stimuli  : No Zamora catheter no bladder distention no suprapubic tenderness    Result Review    Result Review:  I have personally reviewed these results and agree with these findings:  [x]  Laboratory  LAB RESULTS:      Lab  05/01/23  0511 04/30/23  0426 04/29/23  0413 04/28/23  0814 04/28/23  0522 04/27/23  1931 04/27/23  1545   WBC  --  3.18* 3.45  --  5.98  --  4.48   HEMOGLOBIN 7.8* 7.1* 7.2*  --  8.2*  --  7.5*   HEMATOCRIT 24.7* 23.3* 22.6*  --  26.7*  --  23.5*   PLATELETS  --  79* 75*  --  99*  --  95*   NEUTROS ABS  --   --  2.17  --  4.18  --  3.09   IMMATURE GRANS (ABS)  --   --  0.06*  --  0.02  --  0.01   LYMPHS ABS  --   --  0.69*  --  0.98  --  0.72   MONOS ABS  --   --  0.41  --  0.58  --  0.50   EOS ABS  --   --  0.09  --  0.17  --  0.13   MCV  --  79.8 78.5*  --  80.9  --  77.3*   PROCALCITONIN  --   --   --   --   --   --  0.22   LACTATE  --   --   --  1.7  --  1.3 2.1*   PROTIME  --   --   --   --  20.8*  --  21.7*   APTT  --   --   --   --   --   --  35.8*         Lab 04/30/23  0426 04/29/23  0413 04/28/23  1144 04/28/23 0522 04/27/23  1545   SODIUM 132* 133* 133*  133* 133* 129*   POTASSIUM 4.8 5.5* 5.7*  5.7* 6.0* 5.5*   CHLORIDE 103 105 105  105 105 101   CO2 21.5* 17.7* 18.3*  18.3* 17.4* 16.4*   ANION GAP 7.5 10.3 9.7  9.7 10.6 11.6   BUN 20 22* 22*  22* 25* 27*   CREATININE 1.19 1.17 1.38*  1.38* 1.46* 1.62*   EGFR 71.2 72.7 59.6*  59.6* 55.7* 49.2*   GLUCOSE 220* 213* 115*  115* 96 277*   CALCIUM 8.4* 8.3* 8.5*  8.5* 8.4* 8.5*   MAGNESIUM  --  1.7 1.8 1.9 1.9   PHOSPHORUS 3.7  --  3.7  --   --          Lab 04/30/23  0426 04/29/23  0413 04/28/23  1144 04/28/23  0522 04/27/23  1545   TOTAL PROTEIN  --  5.5* 6.1 6.5 6.4   ALBUMIN 3.1* 2.9* 3.3*  3.3* 3.5 3.5   GLOBULIN  --  2.6 2.8 3.0 2.9   ALT (SGPT)  --  16 17 19 18   AST (SGOT)  --  30 30 35 30   BILIRUBIN  --  0.7 0.8 0.9 0.8   ALK PHOS  --  79 79 88 88         Lab 04/28/23  0522 04/27/23  1545   PROTIME 20.8* 21.7*   INR 1.80* 1.91*             Lab 04/30/23  0426 04/27/23  1545   IRON 33*  --    IRON SATURATION 7*  --    TIBC 441  --    TRANSFERRIN 296  --    ABO TYPING  --  A   RH TYPING  --  Positive   ANTIBODY SCREEN  --  Negative          Brief Urine Lab Results  (Last result in the past 365 days)      Color   Clarity   Blood   Leuk Est   Nitrite   Protein   CREAT   Urine HCG        04/19/23 2201 Dark Yellow   Clear   Small (1+)   Trace   Negative   Negative               Microbiology Results (last 10 days)     Procedure Component Value - Date/Time    Blood Culture - Blood, Arm, Right [[id number]]  (Normal) Collected: 04/27/23 1931    Lab Status: Preliminary result Specimen: Blood from Arm, Right Updated: 04/30/23 1946     Blood Culture No growth at 3 days    Blood Culture - Blood, Arm, Right [[id number]]  (Normal) Collected: 04/27/23 1545    Lab Status: Preliminary result Specimen: Blood from Arm, Right Updated: 04/30/23 1600     Blood Culture No growth at 3 days          [x]  Microbiology  [x]  Radiology  CT Abdomen Pelvis With Contrast    Result Date: 4/27/2023    1. No source of GI bleeding identified. 2. Cirrhosis, splenomegaly and varices suggesting portal venous hypertension. 3. Small fat and fluid containing supraumbilical midline ventral hernia. 4. Evidence of left-sided pelvic ORIF/reconstruction. 5. Moderate thoracolumbar degenerative changes.     CR CONTRERAS MD       Electronically Signed and Approved By: CR CONTRERAS MD on 4/27/2023 at 20:30               [x]  EKG/Telemetry   []  Cardiology/Vascular   []  Pathology  []  Old records  [x]  Other:  Scheduled Meds:atorvastatin, 40 mg, Oral, Nightly  busPIRone, 7.5 mg, Oral, TID  cefTRIAXone, 2 g, Intravenous, Q24H  cetirizine, 10 mg, Oral, Daily  furosemide, 40 mg, Oral, Daily  insulin detemir, 8 Units, Subcutaneous, Daily  insulin lispro, 2-7 Units, Subcutaneous, TID With Meals  lactulose, 30 g, Oral, TID With Meals  magnesium oxide, 400 mg, Oral, Daily  pantoprazole, 40 mg, Oral, Q AM  riFAXIMin, 550 mg, Oral, Q12H  rOPINIRole, 1 mg, Oral, Nightly  sertraline, 100 mg, Oral, Nightly  sodium chloride, 10 mL, Intravenous, Q12H      Continuous Infusions:   PRN Meds:.•  dextrose  •   dextrose  •  diphenhydrAMINE  •  glucagon (human recombinant)  •  levalbuterol  •  ondansetron  •  oxyCODONE  •  oxyCODONE  •  sodium chloride  •  sodium chloride  •  sodium chloride  •  sodium chloride  •  traZODone      Assessment & Plan   Assessment / Plan     Assessment/Plan:  Bright red blood per rectum  Cirrhosis with portal hypertension and history of esophageal varices and coagulopathy and thrombocytopenia  Acute on chronic anemia  due to blood loss   Acute kidney injury with concern for HRS with baseline creatinine between 0.9 and 1.1  Coagulopathy  Thrombocytopenia  Acute metabolic encephalopathy most likely due to mild hepatic encephalopathy  Hyperkalemia with potassium of 6.0  Nongap metabolic acidosis  Elevated lactate on admission that is improved     Plan:  Admit to hospital service for further evaluation and treatment of the above  Gastroenterology consulted thank you for assistance  EGD negative for any acute signs of bleeding   Monitor hemoglobin transfuse to keep greater than 7  Continue home furosemide  Continue to monitor electrolytes and renal function closely  Continue replace electrolytes as needed  Continue Protonix p.o. daily  Continue to Avoid nephrotoxic agents  Continue ceftriaxone for GI bleed and some portal hypertension  Continue SSI  Continue Levemir daily and titrate as needed  Further inpatient orders and recommendations pending clinical course        Discussed plan with RN.     Disposition: Patient initially wanted to discharge to [facility] on discharge though worked well with physical therapy occupational therapy and [facility] 23-24 can likely return home pending hemoglobin remained stable for 5/2/23.            DVT prophylaxis:  Mechanical DVT prophylaxis orders are present.    CODE STATUS:   Code Status (Patient has no pulse and is not breathing): CPR (Attempt to Resuscitate)  Medical Interventions (Patient has pulse or is breathing): Full  Support
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
## 2023-05-01 NOTE — TELEPHONE ENCOUNTER
Chart reviewed and noted he was admitted to hospital after GI appointment and is still inpatient as of 4/27/23 and plan is for Colonoscopy today and had an EGD 4/28/23. Notes show normal EGD and no varices.     Outreach to hospital Case Management due to patient previously requesting admit to LTC for permanent placement and left my work cell for call back. However, per further chart review the  reports on 4/28/23 she talked with patient and he also discussed need to LTC placement and she sent notes to [facility] in [place], office staff that does our referrals was updated and we are closing his HH referral for now and holding on the LTC/SNU referral to see what happens while in patient.

## 2023-05-01 NOTE — NURSING NOTE
[facility]   [address], [place] [address]   [email or web address]   OFFICE: [phone]          Thank you Jac Benavides for asking the [facility] team to participate in the care of your patient, Riley Rodriguez.     Impression and Plan     1. Atrial fibrillation (permanent) ventricular response is well-controlled based on recent device interrogations. Riley's [id number] Score is at least 5 yielding an annual embolic risk of 7.2-10.0%. Patient is maintained on warfarin therapy for CVA prophylaxis.     2.  Mitral stenosis.  This was felt mild in degree an echocardiogram 14 October 2021.     3. Hypertension. Blood pressure is fairly slightly elevated in the office today.  Plan to simply continue current management for now.     Plan on follow-up in one year. Patient will be followed intermittently through [facility] as well per protocol.    35 minutes spent reviewing prior records (including documentation, laboratory studies, cardiac testing/imaging), interview with patient along with physical exam, planning, and subsequent documentation/crafting of note).           History of Present Illness    Once again I would like to thank you again for asking me to participate in the care of your patient, Riley Rodriguez.  As you know, but to reiterate for my own records, Riley Rodriguez is a 89 year old male with permanent atrial fibrillation. He also has a history of permanent pacemaker placement.     In follow-up today, patient is without cardiovascular complaint.  He also has a history of having suffered a cerebrovascular accident and also developed Bell's palsy.  On interview, Riley continues to do well from a cardiac standpoint.  He despite his advanced age, he exercises on a regular basis.  Denies chest pain.  Breathing is comfortable.  No subjective palpitations despite permanent atrial fibrillation.  No lightheadedness.    Further  Endo. Notified that patient refused to complete GI prep.    review of systems is otherwise negative/noncontributory (medical record and 13 point review of systems reviewed as well and pertinent positives noted).         Cardiac Diagnostics      Echocardiogram 14 October 2021:  Normal left ventricular size and systolic performance with ejection fraction of 55 to 60%.  Mild mitral stenosis.  Normal right ventricular size and systolic performance.  Severe left atrial enlargement.  Moderate right atrial enlargement.  Right ventricular systolic pressure relative to right true pressure is mildly increased.  Pulmonary artery pressure is estimated to be 35-40 mmHg plus right atrial pressure.    Echocardiogram 1 December 2017:  Normal left ventricular size and systolic performance with ejection fraction of 60%.  Mild mitral stenosis.  Trace aortic insufficiency.  Normal right ventricular size and systolic performance.  Severe left atrial enlargement.  Mild right atrial enlargement.  Right ventricular systolic pressure relative to right atrial pressure is mildly increased.  Pulmonary artery pressure estimate 35-40 mmHg plus right atrial pressure.  When compared to prior echocardiogram 10 September 2014, no significant change.    Echocardiogram 10 September 2014:  Normal left ventricular size and systolic performance with ejection fraction of 60%.  Mild concentric increased LV wall thickness.  Mild mitral stenosis.  Severe left atrial enlargement. Moderate right atrial enlargement.    Echocardiogram 7 June 2011:  Normal LV size and function withejection fraction of 75%.   No significant valvular heart disease identified.    Device interrogation 28 July 2022 ([employer] 1272 Assurity MRI device implanted 2 October 2020):  Encounter Type: routine remote pacemaker transmission.  Device: [employer] P[employer].  Pacing % /Programmed:  97% at VVIR 60.  Lead(s): stable.  Battery longevity: 9yrs, 4mo.  Presenting: ventricular pacing 62 bpm.  Anticoagulant: warfarin.  Ventricular arrhythmia:  "since 12 April 2022; none detected.  Device/Lead alerts: none.  Comments: Normal magnet and pacemaker function.     Device interrogation 27 August 2021 ([employer] 1272 Assurity MRI device implanted 2 October 2020):  Presenting rhythm: ventricular pacing, rate 60 bpm.  battery/lead status: stable  Arrhythmias: since last interrogation, no ventricular high rate episodes detected.   Anticoagulant: warfarin  Comments: normal magnet and pacemaker function.     Device interrogation 3 August 2021 ([employer] 1272 Assurity MRI device implanted 2 October 2020):  Type: routine remote pacemaker transmission.  Presenting rhythm: ventricular pacing, rate 60 bpm.  battery/lead status: stable  Arrhythmias: since 23 April 2021, no ventricular high rate episodes detected. No new noise reversions.  Anticoagulant: warfarin  Comments: normal magnet and pacemaker function.     Device interrogation 15 August 2019 ([employer] 5626 Ace XL SR device implanted 20 April 2009):  The rhythm appears to be atrial fibrillation with ventricular pacing.  Normal magnet and pacemaker function. Patient takes warfarin.    Device interrogation 23 August 2018 ([employer] 5626 Ace XL SR device implanted 20 April 2009):  The rhythm appears to be atrial fibrillation with ventricular pacing.  Normal magnet and pacemaker function. Patient takes warfarin.    Device interrogation 23 August 2018 ([employer] 5626 Ace XL SR device implanted 20 April 2009):  The rhythm appears to be atrial fibrillation with ventricular pacing.  Normal magnet and pacemaker function. Patient takes warfarin.            Physical Examination       /58 (BP Location: Right arm, Patient Position: Sitting, Cuff Size: Adult Regular)   Pulse 60   Resp 16   Ht 1.778 m (5' 10\")   Wt 92.9 kg (204 lb 12.8 oz)   BMI 29.39 kg/m          Wt Readings from Last 3 Encounters:   10/26/22 92.9 kg (204 lb 12.8 oz)   10/19/22 92.9 kg (204 lb 12.8 oz) "   09/02/22 92 kg (202 lb 12.8 oz)     The patient is alert and oriented times three. Sclerae are anicteric. Mucosal membranes are moist. Jugular venous pressure is normal. No significant adenopathy/thyromegally appreciated. Lungs are clear with good expansion. On cardiovascular exam, the patient has a regular S1 and S2 (suspected paced). Abdomen is soft and non-tender. Extremities reveal no clubbing, cyanosis, or edema.           Medications  Allergies   Current Outpatient Medications   Medication Sig Dispense Refill     acetaminophen (TYLENOL) 500 MG tablet Take 1,000 mg by mouth every 6 hours as needed for fever or pain       atorvastatin (LIPITOR) 40 MG tablet [ATORVASTATIN (LIPITOR) 40 MG TABLET] Take 1 tablet (40 mg total) by mouth at bedtime. For hx of cva 30 tablet 0     bisacodyL (DULCOLAX) 10 mg suppository [BISACODYL (DULCOLAX) 10 MG SUPPOSITORY] Insert 10 mg into the rectum daily as needed.       docusate sodium (COLACE) 100 MG capsule [DOCUSATE SODIUM (COLACE) 100 MG CAPSULE] Take 1 capsule (100 mg total) by mouth 2 (two) times a day. For constipation 30 capsule 0     Docusate Sodium (DOCU LIQUID PO) Place 5 drops into both ears daily as needed prior to irrigation for cerumen removal       dorzolamide-timoloL (COSOPT) 22.3-6.8 mg/mL ophthalmic solution [DORZOLAMIDE-TIMOLOL (COSOPT) 22.3-6.8 MG/ML OPHTHALMIC SOLUTION] Administer 1 drop into the left eye 2 (two) times a day. glaucoma 10 mL 12     erythromycin base (ERYTHROMYCIN OPHT) Place 5 mg into both eyes At Bedtime Instill 1 ribbon in Left Eye and 1 ribbon in Right Eye at bedtime. Ensure ointment is given after eye drops to ensure proper absorption.       ferrous sulfate 325 (65 FE) MG tablet [FERROUS SULFATE 325 (65 FE) MG TABLET] Take 1 tablet by mouth daily.       guaiFENesin (ROBITUSSIN) 100 mg/5 mL syrup Take 10 mLs by mouth every 4 hours as needed for cough        latanoprost (XALATAN) 0.005 % ophthalmic solution [LATANOPROST (XALATAN) 0.005 %  OPHTHALMIC SOLUTION] Administer 1 drop into the left eye at bedtime. glaucoma 2.5 mL 12     levothyroxine (SYNTHROID, LEVOTHROID) 25 MCG tablet [LEVOTHYROXINE (SYNTHROID, LEVOTHROID) 25 MCG TABLET] Take 1 tablet (25 mcg total) by mouth Daily at 6:00 am. Hypothyroid 30 tablet 0     MEDICATION CANNOT BE REORDERED - PLEASE MANUALLY REORDER AND DISCONTINUE THE OLD ORDER [PROPYLENE GLYCOL (SYSTANE COMPLETE) 0.6 % DROP] Apply 1 drop to eye 2 (two) times a day. Both eyes for dry eyes 1.5 mL 0     menthol-zinc oxide (CALMOSEPTINE) 0.44-20.6 % Oint ointment Apply topically as needed for skin protection (Redness) Buttocks       nystatin (MYCOSTATIN) 184961 UNIT/GM external powder Apply topically 2 times daily       omeprazole (PRILOSEC) 20 MG capsule [OMEPRAZOLE (PRILOSEC) 20 MG CAPSULE] Take 1 capsule (20 mg total) by mouth 2 (two) times a day before meals. Needs two times a day for 2 months, then daily for ulcer 60 capsule 0     Propylene Glycol (SYSTANE COMPLETE OP) Place 1 drop into both eyes 2 times daily       WARFARIN SODIUM PO 5/2/22 INR 2.40  Cont 7mg Mon and Thurs and 6.5mg AOD.  Next INR 6/1/22.       No Known Allergies       Lab Results    Chemistry/lipid CBC Cardiac Enzymes/BNP/TSH/INR   Recent Labs   Lab Test 12/24/18  0717   CHOL 95   HDL 37*   LDL 38   TRIG 100     Recent Labs   Lab Test 12/24/18  0717 10/18/17  1115 10/19/16  1153   LDL 38 41 53     Recent Labs   Lab Test 10/01/20  0650      POTASSIUM 4.1   CHLORIDE 102   CO2 28   GLC 99   BUN 12   CR 0.79   GFRESTIMATED >60   GENO 9.1     Recent Labs   Lab Test 10/01/20  0650 08/19/20  0712 08/18/20  0717   CR 0.79 0.74 0.70     Recent Labs   Lab Test 10/23/18  1055 04/17/18  1026 10/18/17  1115   A1C 5.9 6.2* 6.1*          Recent Labs   Lab Test 07/29/22  0556 03/09/22  0458   WBC  --  7.3   HGB 12.2* 11.8*   HCT  --  35.6*   MCV  --  90   PLT  --  177     Recent Labs   Lab Test 07/29/22  0556 03/09/22  0458 01/21/22  1320   HGB 12.2* 11.8* 12.2*     Recent Labs   Lab Test 08/14/20  0724 08/14/20  0114 08/13/20  1936   TROPONINI 0.08 0.07 0.06     Recent Labs   Lab Test 12/20/18  0221   BNP 81     Recent Labs   Lab Test 06/02/22  0505   TSH 4.10     Recent Labs   Lab Test 09/28/22  0530 08/31/22  0520 08/03/22  0505   INR 2.53* 2.64* 2.49*        Medical History  Surgical History Family History Social History   Past Medical History:   Diagnosis Date     Atrial fibrillation (H)     On coumadin     Bell's palsy     right sided facial droop     CVA (cerebral vascular accident) (H)      Hypertension      Pacemaker      Urinary retention      Past Surgical History:   Procedure Laterality Date     HC TRANSURETHRAL ELEC-SURG PROSTATECTOM      Description: Transurethral Resection Of Prostate (TURP);  Recorded: 03/24/2008;     IR AORTIC ARCH 4 VESSEL ANGIOGRAM  1/1/2004     IR BLADDER SUPRAPUBIC CATHETER INSERTION  1/18/2019     IR MISCELLANEOUS PROCEDURE  1/1/2004     IR MISCELLANEOUS PROCEDURE  1/1/2004     IR MISCELLANEOUS PROCEDURE  1/1/2004     IR SUPRAPUBIC CATHETER PLACMENT  1/18/2019     IR THORACIC AORTOGRAM  1/1/2004     IR VISCERAL ANGIOGRAM  1/1/2004     IR VISCERAL ANGIOGRAM  1/1/2004     IR VISCERAL ANGIOGRAM  1/1/2004     IR VISCERAL ANGIOGRAM  1/1/2004     IR VISCERAL ANGIOGRAM  1/1/2004     IR VISCERAL ANGIOGRAM  1/1/2004     IR VISCERAL ANGIOGRAM  1/1/2004     MN ESOPHAGOGASTRODUODENOSCOPY TRANSORAL DIAGNOSTIC N/A 8/15/2020    Procedure: ESOPHAGOGASTRODUODENOSCOPY (EGD) with biopsy;  Surgeon: Paxton Villalpando MD;  Location: [facility];  Service: Gastroenterology     MN PARTIAL EXCISION THYROID,UNILAT      Description: Thyroid Surgery Sub-Total Thyroidectomy;  Recorded: 03/24/2008;     REMV PILONIDAL LESION SIMPLE      Description: Pilonidal Cyst Resection;  Recorded: 03/24/2008;     TOTAL HIP ARTHROPLASTY Right      Family History   Problem Relation Age of Onset     Chronic Obstructive Pulmonary Disease Father         Social History      Socioeconomic History     Marital status:      Spouse name: Not on file     Number of children: Not on file     Years of education: Not on file     Highest education level: Not on file   Occupational History     Not on file   Tobacco Use     Smoking status: Former     Smokeless tobacco: Never   Substance and Sexual Activity     Alcohol use: No     Drug use: No     Sexual activity: Not on file   Other Topics Concern     Not on file   Social History Narrative    03/07/15 - The patient lives alone in his own home.     Social Determinants of Health     Financial Resource Strain: Not on file   Food Insecurity: Not on file   Transportation Needs: Not on file   Physical Activity: Not on file   Stress: Not on file   Social Connections: Not on file   Intimate Partner Violence: Not on file   Housing Stability: Not on file

## 2023-05-01 NOTE — PROGRESS NOTES
[facility]  Inpatient Progress Note    Reason for Follow Up: Rectal bleeding, cirrhosis, no ascites    Subjective     Interval History:   Patient reports bleeding seemingly has resolved.  Patient could not tolerate bowel prep due to abdominal cramping.    Current Facility-Administered Medications:   •  atorvastatin (LIPITOR) tablet 40 mg, 40 mg, Oral, Nightly, Dioni Jimenez MD, 40 mg at 04/30/23 2100  •  busPIRone (BUSPAR) tablet 7.5 mg, 7.5 mg, Oral, TID, Dioni Jimenez MD, 7.5 mg at 05/01/23 0911  •  cefTRIAXone (ROCEPHIN) IVPB 2 g, 2 g, Intravenous, Q24H, Karlos Roblero MD, Last Rate: 100 mL/hr at 05/01/23 0910, 2 g at 05/01/23 0910  •  cetirizine (zyrTEC) tablet 10 mg, 10 mg, Oral, Daily, Dioni Jimenez MD, 10 mg at 05/01/23 0910  •  dextrose (D50W) (25 g/50 mL) IV injection 25 g, 25 g, Intravenous, Q15 Min PRN, Dioni Jimenez MD  •  dextrose (GLUTOSE) oral gel 15 g, 15 g, Oral, Q15 Min PRN, Dioni Jimenez MD  •  diphenhydrAMINE (BENADRYL) capsule 25 mg, 25 mg, Oral, Q6H PRN, Dioni Jimenez MD, 25 mg at 04/30/23 2100  •  furosemide (LASIX) tablet 40 mg, 40 mg, Oral, Daily, Dioni Jimenez MD, 40 mg at 05/01/23 0910  •  glucagon (GLUCAGEN) injection 1 mg, 1 mg, Intramuscular, Q15 Min PRN, Karlos Roblero MD  •  insulin detemir (LEVEMIR) injection 8 Units, 8 Units, Subcutaneous, Daily, Dioni Jimenez MD, 8 Units at 04/30/23 0854  •  Insulin Lispro (humaLOG) injection 2-7 Units, 2-7 Units, Subcutaneous, TID With Meals, Dioni Jimenez MD, 7 Units at 04/30/23 1252  •  lactated ringers infusion, 30 mL/hr, Intravenous, Continuous, Star Alfred MD  •  lactulose (CHRONULAC) 10 GM/15ML solution 30 g, 30 g, Oral, TID With Meals, Dioni Jimenez MD, 30 g at 04/30/23 1734  •  levalbuterol (XOPENEX) nebulizer solution 1.25 mg, 1.25 mg, Nebulization, Q6H PRN, Dioni Jimenez MD  •  magnesium oxide (MAG-OX) tablet 400 mg, 400 mg, Oral, Daily, Dioni Jimenez MD, 400 mg  at 04/30/23 0854  •  ondansetron (ZOFRAN) injection 4 mg, 4 mg, Intravenous, Q6H PRN, Karlos Roblero MD, 4 mg at 05/01/23 0752  •  oxyCODONE (ROXICODONE) immediate release tablet 10 mg, 10 mg, Oral, Q4H PRN, Dioni Jimenez MD, 10 mg at 05/01/23 1259  •  oxyCODONE (ROXICODONE) immediate release tablet 5 mg, 5 mg, Oral, Q4H PRN, Dioni Jimenez MD  •  pantoprazole (PROTONIX) EC tablet 40 mg, 40 mg, Oral, Q AM, Karlos Roblero MD, 40 mg at 05/01/23 0511  •  riFAXIMin (XIFAXAN) tablet 550 mg, 550 mg, Oral, Q12H, Dioni Jimenez MD, 550 mg at 04/30/23 2059  •  rOPINIRole (REQUIP) tablet 1 mg, 1 mg, Oral, Nightly, Dioni Jimenez MD, 1 mg at 04/30/23 2100  •  sertraline (ZOLOFT) tablet 100 mg, 100 mg, Oral, Nightly, Dioni Jimenez MD, 100 mg at 04/30/23 2100  •  sodium chloride 0.9 % flush 10 mL, 10 mL, Intravenous, PRN, Karlos Roblero MD  •  sodium chloride 0.9 % flush 10 mL, 10 mL, Intravenous, PRN, Karlos Roblero MD  •  sodium chloride 0.9 % flush 10 mL, 10 mL, Intravenous, PRN, Karlos Roblero MD, 10 mL at 04/28/23 1621  •  sodium chloride 0.9 % flush 10 mL, 10 mL, Intravenous, Q12H, Karlos Roblero MD, 10 mL at 05/01/23 0910  •  sodium chloride 0.9 % infusion 40 mL, 40 mL, Intravenous, PRN, Karlos Roblero MD  •  traZODone (DESYREL) tablet 50 mg, 50 mg, Oral, Nightly PRN, Dioni Jimenez MD  Review of Systems:    All systems were reviewed and negative except for that previously mentioned in the HPI    Objective     Vital Signs  Temp:  [97.5 °F (36.4 °C)-98.6 °F (37 °C)] 98.1 °F (36.7 °C)  Heart Rate:  [85-95] 85  Resp:  [18-20] 18  BP: (117-160)/(31-74) 147/66  Body mass index is 41.43 kg/m².    Intake/Output Summary (Last 24 hours) at 5/1/2023 1452  Last data filed at 4/30/2023 1720  Gross per 24 hour   Intake 240 ml   Output --   Net 240 ml     No intake/output data recorded.     Physical Exam:   General: patient awake, alert and cooperative   Eyes: Normal lids  and lashes, no scleral icterus   Neck: supple, normal ROM   Skin: warm and dry, not jaundiced   Cardiovascular: regular rhythm and rate, no murmurs auscultated   Pulm: clear to auscultation bilaterally, regular and unlabored   Abdomen: soft, nontender, nondistended; normal bowel sounds   Rectal: deferred   Extremities: no rash or edema   Psychiatric: Normal mood and behavior; memory intact     Results Review:     I reviewed the patient's new clinical results.    Results from last 7 days   Lab Units 05/01/23  0511 04/30/23  0426 04/29/23 0413 04/28/23  0522   WBC 10*3/mm3  --  3.18* 3.45 5.98   HEMOGLOBIN g/dL 7.8* 7.1* 7.2* 8.2*   HEMATOCRIT % 24.7* 23.3* 22.6* 26.7*   PLATELETS 10*3/mm3  --  79* 75* 99*     Results from last 7 days   Lab Units 04/30/23  0426 04/29/23  0413 04/28/23  1144 04/28/23  0522   SODIUM mmol/L 132* 133* 133*  133* 133*   POTASSIUM mmol/L 4.8 5.5* 5.7*  5.7* 6.0*   CHLORIDE mmol/L 103 105 105  105 105   CO2 mmol/L 21.5* 17.7* 18.3*  18.3* 17.4*   BUN mg/dL 20 22* 22*  22* 25*   CREATININE mg/dL 1.19 1.17 1.38*  1.38* 1.46*   CALCIUM mg/dL 8.4* 8.3* 8.5*  8.5* 8.4*   BILIRUBIN mg/dL  --  0.7 0.8 0.9   ALK PHOS U/L  --  79 79 88   ALT (SGPT) U/L  --  16 17 19   AST (SGOT) U/L  --  30 30 35   GLUCOSE mg/dL 220* 213* 115*  115* 96     Results from last 7 days   Lab Units 04/28/23  0522 04/27/23  1545   INR  1.80* 1.91*     Lab Results   Lab Value Date/Time    LIPASE 63 (H) 03/24/2023 1040    LIPASE 38 02/05/2023 1532    LIPASE 40 12/18/2022 0238    LIPASE 55 11/04/2022 1731    LIPASE 68 (H) 10/31/2022 0924    LIPASE 52 06/27/2022 1449    LIPASE 20 08/03/2021 1206    LIPASE 28 06/01/2021 1622       Radiology:  [unfilled]        Assessment:       GI bleed    Liver cirrhosis secondary to ROMO (nonalcoholic steatohepatitis)       Plan:   Patient's rectal bleeding seemingly better and I suspect hemorrhoids.  His last colonoscopy 3 years ago resulted in treatment of hemorrhoids with 3  bands placed.  He did not tolerate bowel prep and would like to avoid colonoscopy at this time.    We will cancel colonoscopy for today given lack of bowel prep  Advance diet as tolerated  Monitor H&H  Continue current meds.    I discussed the patients findings and my recommendations with patient.    Karlos Roblero M.D.  Gastroenterology

## 2023-05-02 ENCOUNTER — READMISSION MANAGEMENT (OUTPATIENT)
Dept: CALL CENTER | Facility: HOSPITAL | Age: 58
End: 2023-05-02
Payer: COMMERCIAL

## 2023-05-02 VITALS
WEIGHT: 272.49 LBS | SYSTOLIC BLOOD PRESSURE: 143 MMHG | RESPIRATION RATE: 18 BRPM | TEMPERATURE: 98.4 F | BODY MASS INDEX: 41.3 KG/M2 | OXYGEN SATURATION: 90 % | DIASTOLIC BLOOD PRESSURE: 67 MMHG | HEART RATE: 87 BPM | HEIGHT: 68 IN

## 2023-05-02 LAB
ALBUMIN SERPL-MCNC: 3.2 G/DL (ref 3.5–5.2)
ANION GAP SERPL CALCULATED.3IONS-SCNC: 7.2 MMOL/L (ref 5–15)
BACTERIA SPEC AEROBE CULT: NORMAL
BACTERIA SPEC AEROBE CULT: NORMAL
BUN SERPL-MCNC: 22 MG/DL (ref 6–20)
BUN/CREAT SERPL: 17.5 (ref 7–25)
CALCIUM SPEC-SCNC: 8.2 MG/DL (ref 8.6–10.5)
CHLORIDE SERPL-SCNC: 103 MMOL/L (ref 98–107)
CO2 SERPL-SCNC: 23.8 MMOL/L (ref 22–29)
CREAT SERPL-MCNC: 1.26 MG/DL (ref 0.76–1.27)
CYTO UR: NORMAL
DEPRECATED RDW RBC AUTO: 52.4 FL (ref 37–54)
EGFRCR SERPLBLD CKD-EPI 2021: 66.5 ML/MIN/1.73
ERYTHROCYTE [DISTWIDTH] IN BLOOD BY AUTOMATED COUNT: 17.9 % (ref 12.3–15.4)
GLUCOSE BLDC GLUCOMTR-MCNC: 144 MG/DL (ref 70–99)
GLUCOSE BLDC GLUCOMTR-MCNC: 194 MG/DL (ref 70–99)
GLUCOSE SERPL-MCNC: 159 MG/DL (ref 65–99)
HCT VFR BLD AUTO: 24.2 % (ref 37.5–51)
HGB BLD-MCNC: 7.4 G/DL (ref 13–17.7)
LAB AP CASE REPORT: NORMAL
LAB AP CLINICAL INFORMATION: NORMAL
LAB AP SPECIAL STAINS: NORMAL
MCH RBC QN AUTO: 24.7 PG (ref 26.6–33)
MCHC RBC AUTO-ENTMCNC: 30.6 G/DL (ref 31.5–35.7)
MCV RBC AUTO: 80.9 FL (ref 79–97)
PATH REPORT.FINAL DX SPEC: NORMAL
PATH REPORT.GROSS SPEC: NORMAL
PHOSPHATE SERPL-MCNC: 3.2 MG/DL (ref 2.5–4.5)
PLATELET # BLD AUTO: 91 10*3/MM3 (ref 140–450)
PMV BLD AUTO: 11.6 FL (ref 6–12)
POTASSIUM SERPL-SCNC: 4.4 MMOL/L (ref 3.5–5.2)
RBC # BLD AUTO: 2.99 10*6/MM3 (ref 4.14–5.8)
SODIUM SERPL-SCNC: 134 MMOL/L (ref 136–145)
WBC NRBC COR # BLD: 3.91 10*3/MM3 (ref 3.4–10.8)

## 2023-05-02 PROCEDURE — 0 CEFTRIAXONE PER 250 MG: Performed by: INTERNAL MEDICINE

## 2023-05-02 PROCEDURE — 25010000002 ONDANSETRON PER 1 MG: Performed by: INTERNAL MEDICINE

## 2023-05-02 PROCEDURE — 80069 RENAL FUNCTION PANEL: CPT | Performed by: FAMILY MEDICINE

## 2023-05-02 PROCEDURE — 99239 HOSP IP/OBS DSCHRG MGMT >30: CPT | Performed by: INTERNAL MEDICINE

## 2023-05-02 PROCEDURE — 63710000001 INSULIN DETEMIR PER 5 UNITS: Performed by: FAMILY MEDICINE

## 2023-05-02 PROCEDURE — 82948 REAGENT STRIP/BLOOD GLUCOSE: CPT

## 2023-05-02 PROCEDURE — 85027 COMPLETE CBC AUTOMATED: CPT | Performed by: FAMILY MEDICINE

## 2023-05-02 RX ORDER — OXYCODONE HYDROCHLORIDE 5 MG/1
5 TABLET ORAL EVERY 4 HOURS PRN
Qty: 18 TABLET | Refills: 0 | Status: SHIPPED | OUTPATIENT
Start: 2023-05-02 | End: 2023-05-05

## 2023-05-02 RX ORDER — LACTULOSE 10 G/15ML
30 SOLUTION ORAL
Qty: 4050 ML | Refills: 0 | Status: SHIPPED | OUTPATIENT
Start: 2023-05-02 | End: 2023-05-12 | Stop reason: SDUPTHER

## 2023-05-02 RX ORDER — NALOXONE HYDROCHLORIDE 4 MG/.1ML
SPRAY NASAL
Qty: 2 EACH | Refills: 0 | Status: SHIPPED | OUTPATIENT
Start: 2023-05-02

## 2023-05-02 RX ORDER — INSULIN DETEMIR 100 [IU]/ML
10 INJECTION, SOLUTION SUBCUTANEOUS DAILY
Qty: 105 ML | Refills: 1
Start: 2023-05-02

## 2023-05-02 RX ADMIN — ONDANSETRON 4 MG: 2 INJECTION INTRAMUSCULAR; INTRAVENOUS at 09:01

## 2023-05-02 RX ADMIN — CETIRIZINE HYDROCHLORIDE 10 MG: 10 TABLET, FILM COATED ORAL at 08:54

## 2023-05-02 RX ADMIN — INSULIN DETEMIR 8 UNITS: 100 INJECTION, SOLUTION SUBCUTANEOUS at 08:54

## 2023-05-02 RX ADMIN — RIFAXIMIN 550 MG: 550 TABLET ORAL at 08:54

## 2023-05-02 RX ADMIN — BUSPIRONE HYDROCHLORIDE 7.5 MG: 7.5 TABLET ORAL at 08:54

## 2023-05-02 RX ADMIN — CEFTRIAXONE SODIUM 2 G: 2 INJECTION, SOLUTION INTRAVENOUS at 08:53

## 2023-05-02 RX ADMIN — OXYCODONE 5 MG: 5 TABLET ORAL at 08:54

## 2023-05-02 RX ADMIN — LACTULOSE 30 G: 20 SOLUTION ORAL at 11:29

## 2023-05-02 RX ADMIN — OXYCODONE 10 MG: 5 TABLET ORAL at 04:28

## 2023-05-02 RX ADMIN — LACTULOSE 30 G: 20 SOLUTION ORAL at 08:53

## 2023-05-02 RX ADMIN — PANTOPRAZOLE SODIUM 40 MG: 40 TABLET, DELAYED RELEASE ORAL at 05:44

## 2023-05-02 RX ADMIN — Medication 10 ML: at 08:53

## 2023-05-02 RX ADMIN — FUROSEMIDE 40 MG: 40 TABLET ORAL at 08:54

## 2023-05-02 RX ADMIN — MAGNESIUM OXIDE TAB 400 MG (241.3 MG ELEMENTAL MG) 400 MG: 400 (241.3 MG) TAB at 08:54

## 2023-05-02 NOTE — OUTREACH NOTE
Prep Survey    Flowsheet Row Responses   [facility] patient discharged from? [facility]   Is LACE score < 7 ? No   Eligibility [facility]   Date of Admission 05/27/23   Date of Discharge 05/02/23   Discharge Disposition Home-Health Care S[facility]   Discharge diagnosis GI bleed   Does the patient have one of the following disease processes/diagnoses(primary or secondary)? Other   Does the patient have Home health ordered? Yes   What is the Home health agency?  [facility]   Is there a DME ordered? No   Prep survey completed? Yes          Anne BLUE - Registered Nurse

## 2023-05-02 NOTE — DISCHARGE SUMMARY
[facility]         HOSPITALIST  DISCHARGE SUMMARY    Patient Name: Nic Nicolas  : 1965  MRN: [id number]    Date of Admission: 2023  Date of Discharge:  23  Primary Care Physician: Alina Crawford DO    Consultants:  -Gastroenterology: Dr. Karlos Roblero    [facility] Problems:  Bright red blood per rectum  Liver cirrhosis secondary to Ruiz with portal hypertension history of esophageal varices and coagulopathy and thrombocytopenia  Acute on chronic anemia due to blood loss  Acute renal failure  Coagulopathy  Thrombocytopenia  Acute metabolic encephalopathy most likely due to mild hepatic encephalopathy, resolved  Type 2 diabetes mellitus with hyperglycemia  Hyperkalemia  [place] metabolic acidosis  Elevated lactate on admission, improved    Hospital Course     Hospital Course:  Nic Nicolas is a 58 y.o. male with liver cirrhosis secondary to RUIZ, type 2 diabetes mellitus with hyperglycemia, obesity (BMI: 41.43), hypertension and hyperlipidemia, BREE who presented with reports of bright red blood per rectum and hematemesis associated with abdominal pain.  Eval in ED showed patient have hemoglobin: 7.5, down from 8.5.  Creatinine also elevated from baseline.  Hyperkalemia noted.  CT abdomen showed varices suggesting portal venous hypertension.  Patient started on octreotide and Protonix drips.  Gastroenterology consulted.  EGD performed on 2023 and no obvious signs of bleeding were noted.  Hemoglobin monitored and stable during admission.  Initial plan had been for patient have colonoscopy on 2023 but patient was unable to tolerate prep and so after discussion Gastroenterology decision was made to not pursue colonoscopy at this time.  Due to hyperkalemia at time of admission patient's home potassium supplementation was discontinued.  Patient had initially requested discharge to [facility] for long-term skilled nursing, however, patient was working well  with PT/OT services with AM-PAC scores of 23-24 and so had patient was discharged home with home health.  Given patient's medical comorbidities he is high risk for readmission.  Importance of medication compliance stressed at length with the patient who voiced understanding.  Patient hemodynamically stable and no additional inpatient evaluation or work-up necessary at this time, patient will discharge home with home health and is to follow-up outpatient with PCP and gastroenterology.    DISCHARGE Follow Up Recommendations for labs and diagnostics:   -Follow-up with PCP in 3 to 5 days  -Follow-up with gastroenterology in 2 weeks    Day of Discharge     Vital Signs:  Temp:  [97.9 °F (36.6 °C)-98.4 °F (36.9 °C)] 98.2 °F (36.8 °C)  Heart Rate:  [77-94] 77  Resp:  [16-18] 18  BP: (126-148)/(59-67) 133/59  Physical Exam:   Gen: No acute distress, Conversant, Pleasant, lying in bed watching TV  Resp: CTAB, No w/r/r, No respiratory distress appreciated  Card: RRR, No m/r/g  Abd: Soft, Nontender, Nondistended, + bowel sounds    Discharge Details        Discharge Medications      New Medications      Instructions Start Date   lactulose 10 GM/15ML solution  Commonly known as: CHRONULAC   30 g, Oral, 3 Times Daily With Meals      naloxone 4 MG/0.1ML nasal spray  Commonly known as: NARCAN   CALL 911. Do not prime. Spray into nostril upon signs of opioid overdose. May repeat in 2 to 3 minutes in opposite nostril if no or minimal breathing and responsiveness, then as needed (if doses are available) every 2 to 3 minutes.      oxyCODONE 5 MG immediate release tablet  Commonly known as: ROXICODONE   5 mg, Oral, Every 4 Hours PRN         Changes to Medications      Instructions Start Date   Levemir FlexPen 100 UNIT/ML injection  Generic drug: insulin detemir  What changed:   · how much to take  · when to take this   10 Units, Subcutaneous, Daily         Continue These Medications      Instructions Start Date   albuterol sulfate HFA  108 (90 Base) MCG/ACT inhaler  Commonly known as: PROVENTIL HFA;VENTOLIN HFA;PROAIR HFA   2 puffs, Inhalation, Every 4 Hours PRN      atorvastatin 40 MG tablet  Commonly known as: LIPITOR   40 mg, Oral, Daily      busPIRone 7.5 MG tablet  Commonly known as: BUSPAR   7.5 mg, Oral, 3 Times Daily      cetirizine 10 MG tablet  Commonly known as: zyrTEC   10 mg, Oral, Daily      diphenhydrAMINE 25 mg capsule  Commonly known as: BENADRYL   25 mg, Oral, Every 6 Hours PRN      Flovent  MCG/ACT inhaler  Generic drug: fluticasone   1 puff, Inhalation, 2 Times Daily - RT      fluticasone 50 MCG/ACT nasal spray  Commonly known as: Flonase   2 sprays, Nasal, Daily      freestyle lancets   USE TO CHECK BLOOD SUGAR 5 TIMES PER DAY.      FREESTYLE LITE test strip  Generic drug: glucose blood   USE TO TEST BLOOD SUGAR FOUR TIMES A DAY      furosemide 40 MG tablet  Commonly known as: LASIX   40 mg, Oral, Daily      HumaLOG KwikPen 100 UNIT/ML solution pen-injector  Generic drug: Insulin Lispro (1 Unit Dial)   15 Units, Subcutaneous, 3 Times Daily Before Meals      lactulose 10 GM/15ML solution solution (encephalopathy)  Commonly known as: Generlac   68 mL, Oral, 2 Times Daily      magnesium oxide 400 MG tablet  Commonly known as: MAG-OX   400 mg, Oral, Daily      omeprazole 40 MG capsule  Commonly known as: priLOSEC   40 mg, Oral, 2 Times Daily      ondansetron ODT 4 MG disintegrating tablet  Commonly known as: ZOFRAN-ODT   4 mg, Translingual, 4 Times Daily PRN      polyethyl glycol-propyl glycol 0.4-0.3 % solution ophthalmic solution (artificial tears)  Commonly known as: SYSTANE   2 drops, Both Eyes, Every 1 Hour PRN      riFAXIMin 550 MG tablet  Commonly known as: XIFAXAN   550 mg, Oral, Every 12 Hours Scheduled      rOPINIRole 1 MG tablet  Commonly known as: REQUIP   1 mg, Oral, Nightly, take 1 hour before bedtime      sertraline 100 MG tablet  Commonly known as: ZOLOFT   100 mg, Oral, Nightly      spironolactone 100 MG  tablet  Commonly known as: Aldactone   100 mg, Oral, 2 Times Daily      traZODone 50 MG tablet  Commonly known as: DESYREL   50 mg, Oral, Nightly      vitamin D 1.25 MG (52687 UT) capsule capsule  Commonly known as: ERGOCALCIFEROL   50,000 Units, Oral, Every 7 Days         Stop These Medications    HYDROcodone-acetaminophen  MG per tablet  Commonly known as: NORCO     potassium chloride 20 MEQ CR tablet  Commonly known as: K-DUR,KLOR-CON        ASK your doctor about these medications      Instructions Start Date   Tranexamic Acid 650 MG tablet   650 mg, Oral, 2 Times Daily             No Known Allergies    Discharge Disposition:  [place] [facility]    [place]iet:  Hospital:  Diet Order   Procedures   • Diet: Cardiac Diets, Diabetic Diets; Healthy Heart (2-3 Na+); Consistent Carbohydrate; Texture: Regular Texture (IDDSI 7); Fluid Consistency: Thin (IDDSI 0)       Discharge Activity:   Activity Instructions     Activity as Tolerated            CODE STATUS:  Code Status and Medical Interventions:   Ordered at: 04/29/23 1033     Code Status (Patient has no pulse and is not breathing):    CPR (Attempt to Resuscitate)     Medical Interventions (Patient has pulse or is breathing):    Full Support       Future Appointments   Date Time Provider Department Center   5/18/2023  2:00 PM Alina Crawford DO [facility] PC [facility]   5/26/2023  9:30 AM NURSE/MA [place]C [place] [facility] ONC [address] [facility]   6/6/2023  9:00 AM [address]   6/12/2023 11:00 AM Giuliana Leonardo APRN [facility] DIAB [place]   6/26/2023  9:45 AM NURSE/MA [place] [facility] ONC [address] [facility]   7/26/2023  9:15 AM NURSE/MA [place] [facility] ONC [address] [facility]   7/28/2023  9:30 AM Juan Jose Sanchez MD [facility] ONC [address] [facility]       Additional Instructions for the Follow-ups that You Need to Schedule     Discharge Follow-up with PCP   As directed       Currently Documented PCP:    Alina Crawford DO    PCP Phone Number:    [phone]     Follow Up Details: Follow-up in 3-5 days          Discharge Follow-up with Specified Provider: Dr. Karlos Roblero; 2 Weeks   As directed      To: Dr. Karlos Roblero    Follow Up: 2 Weeks               Pertinent  and/or Most Recent Results     PROCEDURES:   -EGD (04/28/2023)    RADIOLOGY:  CT Abdomen Pelvis With Contrast [[id number]] Alexander as Reviewed   Order Status: Completed Collected: 04/27/23 2030    Updated: 04/27/23 2034   Narrative:     PROCEDURE: CT ABDOMEN PELVIS W CONTRAST       COMPARISON: [facility], CT, CT ABDOMEN PELVIS W CONTRAST, 2/05/2023, 17:22.       INDICATIONS: GI bleed       TECHNIQUE: After obtaining the patient's consent, CT images were created with non-ionic intravenous   contrast material.         PROTOCOL:   Standard imaging protocol performed       RADIATION:   DLP: 3603.1mGy*cm     Automated exposure control was utilized to minimize radiation dose.   CONTRAST: 93cc Isovue 370 I.V.   LABS:   eGFR: 49.2ml/min/1.73m2       FINDINGS:   There is no obvious blood products or contrast seen within the gastrointestinal tract to suggest a   source of GI bleeding.  The liver is cirrhotic with small size and extensively nodular appearance.     There are several small stable hypodensities in the liver, likely cysts.  Portal vein is patent.     There is a replaced right hepatic artery.  The spleen is enlarged measuring up to 15.2 cm.  There   is no focal splenic abnormality.  Splenic vein appears patent.  There is minimal aortoiliac   atherosclerosis.  Urinary bladder is mildly distended.  Prostate is normal size.  The appendix is   not seen.  The colon is unremarkable.  Small bowel is nondistended.  Pancreas is atrophic without   focal abnormality.  The kidneys and adrenal glands appear normal.  No hydronephrosis.  No ascites,   pneumoperitoneum or lymphadenopathy.  There are varices at the ventral aspect of the JUAN.  There is   a small fat and fluid containing supraumbilical ventral hernia.       There is mild body wall edema.  The heart  size is normal.  The lung bases appear clear.  Distal   esophagus is unremarkable.  There are no acute osseous abnormalities or destructive bone lesions.     There are moderate thoracolumbar degenerative changes.  There is evidence of prior left pelvic ORIF   with reconstruction.       Impression:       1. No source of GI bleeding identified.   2. Cirrhosis, splenomegaly and varices suggesting portal venous hypertension.   3. Small fat and fluid containing supraumbilical midline ventral hernia.   4. Evidence of left-sided pelvic ORIF/reconstruction.   5. Moderate thoracolumbar degenerative changes.                CR CONTRERAS MD         Electronically Signed and Approved By: CR CONTRERAS MD on 4/27/2023 at 20:30          LAB RESULTS:      Lab 05/02/23  0425 05/01/23  0511 04/30/23  0426 04/29/23  0413 04/28/23  0814 04/28/23  0522 04/27/23  1931 04/27/23  1545   WBC 3.91  --  3.18* 3.45  --  5.98  --  4.48   HEMOGLOBIN 7.4* 7.8* 7.1* 7.2*  --  8.2*  --  7.5*   HEMATOCRIT 24.2* 24.7* 23.3* 22.6*  --  26.7*  --  23.5*   PLATELETS 91*  --  79* 75*  --  99*  --  95*   NEUTROS ABS  --   --   --  2.17  --  4.18  --  3.09   IMMATURE GRANS (ABS)  --   --   --  0.06*  --  0.02  --  0.01   LYMPHS ABS  --   --   --  0.69*  --  0.98  --  0.72   MONOS ABS  --   --   --  0.41  --  0.58  --  0.50   EOS ABS  --   --   --  0.09  --  0.17  --  0.13   MCV 80.9  --  79.8 78.5*  --  80.9  --  77.3*   PROCALCITONIN  --   --   --   --   --   --   --  0.22   LACTATE  --   --   --   --  1.7  --  1.3 2.1*   PROTIME  --   --   --   --   --  20.8*  --  21.7*   APTT  --   --   --   --   --   --   --  35.8*         Lab 05/02/23  0425 04/30/23  0426 04/29/23  0413 04/28/23  1144 04/28/23  0522 04/27/23  1545   SODIUM 134* 132* 133* 133*  133* 133* 129*   POTASSIUM 4.4 4.8 5.5* 5.7*  5.7* 6.0* 5.5*   CHLORIDE 103 103 105 105  105 105 101   CO2 23.8 21.5* 17.7* 18.3*  18.3* 17.4* 16.4*   ANION GAP 7.2 7.5 10.3 9.7  9.7 10.6 11.6   BUN 22*  20 22* 22*  22* 25* 27*   CREATININE 1.26 1.19 1.17 1.38*  1.38* 1.46* 1.62*   EGFR 66.5 71.2 72.7 59.6*  59.6* 55.7* 49.2*   GLUCOSE 159* 220* 213* 115*  115* 96 277*   CALCIUM 8.2* 8.4* 8.3* 8.5*  8.5* 8.4* 8.5*   MAGNESIUM  --   --  1.7 1.8 1.9 1.9   PHOSPHORUS 3.2 3.7  --  3.7  --   --          Lab 05/02/23  0425 04/30/23  0426 04/29/23  0413 04/28/23  1144 04/28/23  0522 04/27/23  1545   TOTAL PROTEIN  --   --  5.5* 6.1 6.5 6.4   ALBUMIN 3.2* 3.1* 2.9* 3.3*  3.3* 3.5 3.5   GLOBULIN  --   --  2.6 2.8 3.0 2.9   ALT (SGPT)  --   --  16 17 19 18   AST (SGOT)  --   --  30 30 35 30   BILIRUBIN  --   --  0.7 0.8 0.9 0.8   ALK PHOS  --   --  79 79 88 88         Lab 04/28/23  0522 04/27/23  1545   PROTIME 20.8* 21.7*   INR 1.80* 1.91*             Lab 04/30/23  0426 04/27/23  1545   IRON 33*  --    IRON SATURATION 7*  --    TIBC 441  --    TRANSFERRIN 296  --    ABO TYPING  --  A   RH TYPING  --  Positive   ANTIBODY SCREEN  --  Negative         Brief Urine Lab Results  (Last result in the past 365 days)      Color   Clarity   Blood   Leuk Est   Nitrite   Protein   CREAT   Urine HCG        04/19/23 2201 Dark Yellow   Clear   Small (1+)   Trace   Negative   Negative               Microbiology Results (last 10 days)     Procedure Component Value - Date/Time    Blood Culture - Blood, Arm, Right [[id number]]  (Normal) Collected: 04/27/23 1931    Lab Status: Preliminary result Specimen: Blood from Arm, Right Updated: 05/01/23 1945     Blood Culture No growth at 4 days    Blood Culture - Blood, Arm, Right [[id number]]  (Normal) Collected: 04/27/23 1545    Lab Status: Preliminary result Specimen: Blood from Arm, Right Updated: 05/01/23 1600     Blood Culture No growth at 4 days            Results for orders placed during the hospital encounter of 02/24/22    Duplex Carotid Ultrasound CAR    Interpretation Summary  · No significant stenosis is present in carotid bifurcations bilaterally.  · There are right mid internal  carotid artery velocity elevations that would meet criteria for mild to moderate stenosis, however, this appears to be related to tortuosity in this region.  · No significant plaque in the bifurcations bilaterally.  · Vertebral flow is antegrade bilaterally.      Results for orders placed during the hospital encounter of 02/24/22    Duplex Carotid Ultrasound CAR    Interpretation Summary  · No significant stenosis is present in carotid bifurcations bilaterally.  · There are right mid internal carotid artery velocity elevations that would meet criteria for mild to moderate stenosis, however, this appears to be related to tortuosity in this region.  · No significant plaque in the bifurcations bilaterally.  · Vertebral flow is antegrade bilaterally.      Results for orders placed during the hospital encounter of 01/19/22    Adult Transthoracic Echo Complete W/ Cont if Necessary Per Protocol (With Agitated Saline)    Interpretation Summary  · Saline test results are negative.  · Estimated left ventricular EF = 59% Left ventricular systolic function is normal.  · Left ventricular diastolic function was normal.      Labs Pending at Discharge:  Pending Labs     Order Current Status    Tissue Pathology Exam In process    Blood Culture - Blood, Arm, Right Preliminary result    Blood Culture - Blood, Arm, Right Preliminary result          Time spent on Discharge including face to face service: Greater than 30 minutes    Electronically signed by Asad Farias MD, 05/02/23, 10:23 AM EDT.

## 2023-05-03 ENCOUNTER — TRANSITIONAL CARE MANAGEMENT TELEPHONE ENCOUNTER (OUTPATIENT)
Dept: CALL CENTER | Facility: HOSPITAL | Age: 58
End: 2023-05-03
Payer: COMMERCIAL

## 2023-05-03 ENCOUNTER — TELEPHONE (OUTPATIENT)
Dept: GASTROENTEROLOGY | Facility: CLINIC | Age: 58
End: 2023-05-03
Payer: COMMERCIAL

## 2023-05-03 NOTE — OUTREACH NOTE
Call Center TCM Note    Flowsheet Row Responses   [facility] [place] patient discharged from? [facility]   Does the patient have one of the following disease processes/diagnoses(primary or secondary)? Other   TCM attempt successful? Yes  [Sister]   Call start time 1104   Discharge diagnosis GI bleed   Meds reviewed with patient/caregiver? Yes   Is the patient having any side effects they believe may be caused by any medication additions or changes? No   Does the patient have all medications ordered at discharge? Yes   Is the patient taking all medications as directed (includes completed medication regime)? Yes   Comments HOSP DC FU appt 5/12/23 215 pm.    Does the patient have an appointment with their PCP within 7 days of discharge? Yes   What is the Home health agency?  [facility]   Has home health visited the patient within 72 hours of discharge? Unsure   Home health comments Pt said some one said they would be coming.    Psychosocial issues? No   Did the patient receive a copy of their discharge instructions? Yes   Nursing interventions Reviewed instructions with patient   What is the patient's perception of their health status since discharge? Improving  [however still having issues with abd. ]   Is the patient/caregiver able to teach back signs and symptoms related to disease process for when to call PCP? Yes   Is the patient/caregiver able to teach back signs and symptoms related to disease process for when to call 911? Yes   Is the patient/caregiver able to teach back the hierarchy of who to call/visit for symptoms/problems? PCP, Specialist, Home health nurse, Urgent Care, ED, 911 Yes   TCM call completed? Yes   Wrap up additional comments Pt reports slight improvement. Did have BM today . No blood noted per Pt.    Would this patient benefit from a Referral to Amb Social Work? Yes   Is the patient interested in additional calls from an ambulatory ?  NOTE:  applies to high risk patients requiring additional  follow-up. Yes          Tiffanie Wiseman RN    5/3/2023, 11:09 EDT

## 2023-05-05 ENCOUNTER — TELEPHONE (OUTPATIENT)
Dept: FAMILY MEDICINE CLINIC | Facility: CLINIC | Age: 58
End: 2023-05-05
Payer: COMMERCIAL

## 2023-05-05 NOTE — TELEPHONE ENCOUNTER
Caller: Nic Nicolas    Relationship: Self    Best call back number: [phone]    What specialty or service is being requested: PAIN MANAGEMENT

## 2023-05-09 NOTE — TELEPHONE ENCOUNTER
Patient called and is needing a new referral to a different pain management. He was discharged from his previous one.

## 2023-05-11 ENCOUNTER — READMISSION MANAGEMENT (OUTPATIENT)
Dept: CALL CENTER | Facility: HOSPITAL | Age: 58
End: 2023-05-11
Payer: COMMERCIAL

## 2023-05-11 ENCOUNTER — TELEPHONE (OUTPATIENT)
Dept: FAMILY MEDICINE CLINIC | Facility: CLINIC | Age: 58
End: 2023-05-11
Payer: COMMERCIAL

## 2023-05-11 DIAGNOSIS — R10.9 STOMACH PAIN: Primary | ICD-10-CM

## 2023-05-11 DIAGNOSIS — R10.9 STOMACH PAIN: ICD-10-CM

## 2023-05-11 RX ORDER — HYDROCODONE BITARTRATE AND ACETAMINOPHEN 7.5; 325 MG/1; MG/1
1 TABLET ORAL EVERY 6 HOURS PRN
Qty: 3 TABLET | Refills: 0 | Status: SHIPPED | OUTPATIENT
Start: 2023-05-11

## 2023-05-11 RX ORDER — HYDROCODONE BITARTRATE AND ACETAMINOPHEN 7.5; 325 MG/1; MG/1
1 TABLET ORAL EVERY 6 HOURS PRN
Qty: 3 TABLET | Refills: 0 | Status: SHIPPED | OUTPATIENT
Start: 2023-05-11 | End: 2023-05-11 | Stop reason: SDUPTHER

## 2023-05-11 NOTE — OUTREACH NOTE
Medical Week 2 Survey    Flowsheet Row Responses   [facility] patient discharged from? [facility]   Does the patient have one of the following disease processes/diagnoses(primary or secondary)? Other   Week 2 attempt successful? Yes   Call start time 1052   Discharge diagnosis GI bleed   Call end time 1110   Meds reviewed with patient/caregiver? Yes   Is the patient having any side effects they believe may be caused by any medication additions or changes? No   Is the patient taking all medications as directed (includes completed medication regime)? Yes   Comments regarding appointments Gastro appt on 5/18/23   Does the patient have a primary care provider?  Yes   Does the patient have an appointment with their PCP within 7 days of discharge? Greater than 7 days   Comments regarding PCP 5/12/23   Nursing Interventions Verified appointment date/time/provider   Has the patient kept scheduled appointments due by today? N/A   What is the Home health agency?  [facility]   Has home health visited the patient within 72 hours of discharge? No  [Pt was accepted into a skilled nursing facility but pt has no knowledge of this. Pt is willing to go to a facility at this time. Enc pt to contact PCP regarding this]   Psychosocial issues? No   What is the patient's perception of their health status since discharge? Same   Is the patient/caregiver able to teach back the hierarchy of who to call/visit for symptoms/problems? PCP, Specialist, Home health nurse, Urgent Care, ED, 911 Yes   Week 2 Call Completed? Yes          Chely SORTO - Registered Nurse

## 2023-05-11 NOTE — TELEPHONE ENCOUNTER
Caller: Nic Nicolas    Relationship to patient: Self    Best call back number: [phone] OKAY TO LEAVE MESSAGE ON PHONE    Patient is needing: PATIENT IS CALLING IN TO SEE IF HE COULD HAVE A CALL BACK REGARDING PAIN THAT HE IS HAVING IN HIS STOMACH AND BACK AREA. PATIENT SIAD WHEN HE WAS RELEASED FROM HOSPITAL HE WAS UNDER THE IMPRESSION THAT [facility] WOULD [facility]. PATIENT HAS AN APPOINTMENT TOMORROW AND IS ON THE WAIT LIST. PATIENT SAID HOSPITAL HAD GIVEN HIM OXYCODONE OR ROXYCODONE WHEN RELEASED. PATIENT SAID HE IS IN A GREAT AMOUNT OF PAIN AND REQUESTS A CALL BACK PLEASE      [facility]  [place], [place] - [address], [address] - [phone] [facility] [phone]   [phone]

## 2023-05-12 ENCOUNTER — LAB (OUTPATIENT)
Dept: LAB | Facility: HOSPITAL | Age: 58
End: 2023-05-12
Payer: COMMERCIAL

## 2023-05-12 ENCOUNTER — OFFICE VISIT (OUTPATIENT)
Dept: FAMILY MEDICINE CLINIC | Facility: CLINIC | Age: 58
End: 2023-05-12
Payer: COMMERCIAL

## 2023-05-12 VITALS
RESPIRATION RATE: 16 BRPM | WEIGHT: 283.1 LBS | TEMPERATURE: 98.6 F | DIASTOLIC BLOOD PRESSURE: 59 MMHG | BODY MASS INDEX: 42.9 KG/M2 | HEART RATE: 93 BPM | HEIGHT: 68 IN | OXYGEN SATURATION: 100 % | SYSTOLIC BLOOD PRESSURE: 138 MMHG

## 2023-05-12 DIAGNOSIS — E11.65 TYPE 2 DIABETES MELLITUS WITH HYPERGLYCEMIA, WITHOUT LONG-TERM CURRENT USE OF INSULIN: ICD-10-CM

## 2023-05-12 DIAGNOSIS — G89.29 OTHER CHRONIC PAIN: ICD-10-CM

## 2023-05-12 DIAGNOSIS — D50.0 BLOOD LOSS ANEMIA: ICD-10-CM

## 2023-05-12 DIAGNOSIS — K74.60 LIVER CIRRHOSIS SECONDARY TO NASH (NONALCOHOLIC STEATOHEPATITIS): Chronic | ICD-10-CM

## 2023-05-12 DIAGNOSIS — Z09 HOSPITAL DISCHARGE FOLLOW-UP: Primary | ICD-10-CM

## 2023-05-12 DIAGNOSIS — K75.81 LIVER CIRRHOSIS SECONDARY TO NASH (NONALCOHOLIC STEATOHEPATITIS): Chronic | ICD-10-CM

## 2023-05-12 DIAGNOSIS — I10 PRIMARY HYPERTENSION: Chronic | ICD-10-CM

## 2023-05-12 DIAGNOSIS — K72.10 END STAGE LIVER DISEASE: ICD-10-CM

## 2023-05-12 PROBLEM — E66.01 CLASS 3 SEVERE OBESITY DUE TO EXCESS CALORIES WITH SERIOUS COMORBIDITY AND BODY MASS INDEX (BMI) OF 40.0 TO 44.9 IN ADULT: Chronic | Status: RESOLVED | Noted: 2022-09-08 | Resolved: 2023-05-12

## 2023-05-12 PROBLEM — E66.813 CLASS 3 SEVERE OBESITY DUE TO EXCESS CALORIES WITH SERIOUS COMORBIDITY AND BODY MASS INDEX (BMI) OF 40.0 TO 44.9 IN ADULT: Chronic | Status: RESOLVED | Noted: 2022-09-08 | Resolved: 2023-05-12

## 2023-05-12 LAB
ALBUMIN SERPL-MCNC: 3.4 G/DL (ref 3.5–5.2)
ALBUMIN/GLOB SERPL: 1.2 G/DL
ALP SERPL-CCNC: 111 U/L (ref 39–117)
ALT SERPL W P-5'-P-CCNC: 27 U/L (ref 1–41)
ANION GAP SERPL CALCULATED.3IONS-SCNC: 8.6 MMOL/L (ref 5–15)
AST SERPL-CCNC: 46 U/L (ref 1–40)
BASOPHILS # BLD AUTO: 0.03 10*3/MM3 (ref 0–0.2)
BASOPHILS NFR BLD AUTO: 0.7 % (ref 0–1.5)
BILIRUB SERPL-MCNC: 0.8 MG/DL (ref 0–1.2)
BUN SERPL-MCNC: 12 MG/DL (ref 6–20)
BUN/CREAT SERPL: 9.2 (ref 7–25)
CALCIUM SPEC-SCNC: 8.8 MG/DL (ref 8.6–10.5)
CHLORIDE SERPL-SCNC: 103 MMOL/L (ref 98–107)
CO2 SERPL-SCNC: 23.4 MMOL/L (ref 22–29)
CREAT SERPL-MCNC: 1.3 MG/DL (ref 0.76–1.27)
DEPRECATED RDW RBC AUTO: 47.4 FL (ref 37–54)
EGFRCR SERPLBLD CKD-EPI 2021: 64.1 ML/MIN/1.73
EOSINOPHIL # BLD AUTO: 0.13 10*3/MM3 (ref 0–0.4)
EOSINOPHIL NFR BLD AUTO: 3.2 % (ref 0.3–6.2)
ERYTHROCYTE [DISTWIDTH] IN BLOOD BY AUTOMATED COUNT: 16.4 % (ref 12.3–15.4)
FERRITIN SERPL-MCNC: 30.9 NG/ML (ref 30–400)
GLOBULIN UR ELPH-MCNC: 2.8 GM/DL
GLUCOSE SERPL-MCNC: 204 MG/DL (ref 65–99)
HCT VFR BLD AUTO: 25.9 % (ref 37.5–51)
HGB BLD-MCNC: 7.8 G/DL (ref 13–17.7)
IMM GRANULOCYTES # BLD AUTO: 0.01 10*3/MM3 (ref 0–0.05)
IMM GRANULOCYTES NFR BLD AUTO: 0.2 % (ref 0–0.5)
IRON 24H UR-MRATE: 25 MCG/DL (ref 59–158)
IRON SATN MFR SERPL: 5 % (ref 20–50)
LYMPHOCYTES # BLD AUTO: 0.62 10*3/MM3 (ref 0.7–3.1)
LYMPHOCYTES NFR BLD AUTO: 15.5 % (ref 19.6–45.3)
MCH RBC QN AUTO: 24.2 PG (ref 26.6–33)
MCHC RBC AUTO-ENTMCNC: 30.1 G/DL (ref 31.5–35.7)
MCV RBC AUTO: 80.4 FL (ref 79–97)
MONOCYTES # BLD AUTO: 0.43 10*3/MM3 (ref 0.1–0.9)
MONOCYTES NFR BLD AUTO: 10.7 % (ref 5–12)
NEUTROPHILS NFR BLD AUTO: 2.79 10*3/MM3 (ref 1.7–7)
NEUTROPHILS NFR BLD AUTO: 69.7 % (ref 42.7–76)
NRBC BLD AUTO-RTO: 0 /100 WBC (ref 0–0.2)
PLATELET # BLD AUTO: 76 10*3/MM3 (ref 140–450)
PMV BLD AUTO: 10 FL (ref 6–12)
POTASSIUM SERPL-SCNC: 5.6 MMOL/L (ref 3.5–5.2)
PROT SERPL-MCNC: 6.2 G/DL (ref 6–8.5)
RBC # BLD AUTO: 3.22 10*6/MM3 (ref 4.14–5.8)
SODIUM SERPL-SCNC: 135 MMOL/L (ref 136–145)
TIBC SERPL-MCNC: 487 MCG/DL (ref 298–536)
TRANSFERRIN SERPL-MCNC: 327 MG/DL (ref 200–360)
WBC NRBC COR # BLD: 4.01 10*3/MM3 (ref 3.4–10.8)

## 2023-05-12 PROCEDURE — 83540 ASSAY OF IRON: CPT

## 2023-05-12 PROCEDURE — 36415 COLL VENOUS BLD VENIPUNCTURE: CPT

## 2023-05-12 PROCEDURE — 84466 ASSAY OF TRANSFERRIN: CPT

## 2023-05-12 PROCEDURE — 82728 ASSAY OF FERRITIN: CPT

## 2023-05-12 PROCEDURE — 85025 COMPLETE CBC W/AUTO DIFF WBC: CPT

## 2023-05-12 PROCEDURE — 80053 COMPREHEN METABOLIC PANEL: CPT

## 2023-05-12 RX ORDER — LACTULOSE 10 G/15ML
40 SOLUTION ORAL
Qty: 5400 ML | Refills: 2 | Status: SHIPPED | OUTPATIENT
Start: 2023-05-12 | End: 2023-06-11

## 2023-05-12 RX ORDER — OXYCODONE HYDROCHLORIDE 5 MG/1
5 TABLET ORAL EVERY 6 HOURS PRN
Qty: 12 TABLET | Refills: 0 | Status: SHIPPED | OUTPATIENT
Start: 2023-05-12

## 2023-05-12 RX ORDER — ONDANSETRON 8 MG/1
8 TABLET, ORALLY DISINTEGRATING ORAL EVERY 8 HOURS PRN
Qty: 30 TABLET | Refills: 2 | Status: SHIPPED | OUTPATIENT
Start: 2023-05-12

## 2023-05-12 RX ORDER — OXYCODONE HYDROCHLORIDE AND ACETAMINOPHEN 5; 325 MG/1; MG/1
1 TABLET ORAL EVERY 6 HOURS PRN
COMMUNITY

## 2023-05-12 NOTE — ASSESSMENT & PLAN NOTE
1.  The patient's lactulose was increased to help lower his ammonia and prevent hepatic encephalopathy.  2.  The patient's pain will be addressed with the avoidance of any hepatotoxic medications such as Tylenol.  3.  The patient has been counseled about the hazards of drug use and he has been encouraged to avoid any recreational drugs.  4.  The patient's struggling to remember to take his medication on a regular basis.  Due to this it was decided to send the patient for referral to a nursing home placement.

## 2023-05-12 NOTE — PROGRESS NOTES
"Chief Complaint  referral  and Hospital Follow Up Visit (Date of Admission: 4/27/2023/Date of Discharge:  05/02/23/Bright red blood per rectum/Liver cirrhosis secondary to Ruiz with portal hypertension history of esophageal varices and coagulopathy and thrombocytopenia/Acute on chronic anemia due to blood loss/Acute renal failure/Coagulopathy/Thrombocytopenia/Acute metabolic encephalopathy most likely due to mild hepatic encephalopathy, resolved/Type 2 diabetes mellitus with hyperglycemia/Hyperkalemia/Nongap metabolic acidosis/Elevated lactate on )    Subjective        Nic Nicolas presents to [facility]  History of Present Illness  Patient is a 58-year-old male here for hospital Follow Up Visit (Date of Admission: 4/27/2023/Date of Discharge:  05/02/23/Bright red blood per rectum/Liver cirrhosis secondary to Ruiz with portal hypertension history of esophageal varices and coagulopathy and thrombocytopenia/Acute on chronic anemia due to blood loss/Acute renal failure/Coagulopathy/Thrombocytopenia/Acute metabolic encephalopathy most likely due to mild hepatic encephalopathy, resolved/Type 2 diabetes mellitus with hyperglycemia/Hyperkalemia/Nongap metabolic acidosis/Elevated lactate ).     Patient reports she is still having stomach pain and burning.  He was requesting pain medication be sent in today.    Patient is accompanied by his wife.         Objective   Vital Signs:  /59   Pulse 93   Temp 98.6 °F (37 °C)   Resp 16   Ht 172.7 cm (67.99\")   Wt 128 kg (283 lb 1.6 oz)   SpO2 100%   BMI 43.06 kg/m²   Estimated body mass index is 43.06 kg/m² as calculated from the following:    Height as of this encounter: 172.7 cm (67.99\").    Weight as of this encounter: 128 kg (283 lb 1.6 oz).             Physical Exam  Vitals reviewed.   Constitutional:       Appearance: Normal appearance. He is well-developed. He is morbidly obese.   HENT:      Head: Normocephalic and " atraumatic.      Right Ear: External ear normal.      Left Ear: External ear normal.      Mouth/Throat:      Pharynx: No oropharyngeal exudate.   Eyes:      Conjunctiva/sclera: Conjunctivae normal.      Pupils: Pupils are equal, round, and reactive to light.   Neck:      Vascular: No carotid bruit.   Cardiovascular:      Rate and Rhythm: Normal rate and regular rhythm.      Heart sounds: No murmur heard.    No friction rub. No gallop.   Pulmonary:      Effort: Pulmonary effort is normal.      Breath sounds: Normal breath sounds. No wheezing or rhonchi.   Abdominal:      General: There is no distension.   Skin:     General: Skin is warm and dry.   Neurological:      Mental Status: He is alert and oriented to person, place, and time.      Cranial Nerves: No cranial nerve deficit.      Motor: No weakness.   Psychiatric:         Mood and Affect: Mood and affect normal.         Behavior: Behavior normal.         Thought Content: Thought content normal.         Judgment: Judgment normal.        Result Review :    CMP        4/29/2023    04:13 4/30/2023    04:26 5/2/2023    04:25   CMP   Glucose 213   220   159     BUN 22   20   22     Creatinine 1.17   1.19   1.26     EGFR 72.7   71.2   66.5     Sodium 133   132   134     Potassium 5.5   4.8   4.4     Chloride 105   103   103     Calcium 8.3   8.4   8.2     Total Protein 5.5       Albumin 2.9   3.1   3.2     Globulin 2.6       Total Bilirubin 0.7       Alkaline Phosphatase 79       AST (SGOT) 30       ALT (SGPT) 16       Albumin/Globulin Ratio 1.1       BUN/Creatinine Ratio 18.8   16.8   17.5     Anion Gap 10.3   7.5   7.2       CBC        4/30/2023    04:26 5/1/2023    05:11 5/2/2023    04:25   CBC   WBC 3.18    3.91     RBC 2.92    2.99     Hemoglobin 7.1   7.8   7.4     Hematocrit 23.3   24.7   24.2     MCV 79.8    80.9     MCH 24.3    24.7     MCHC 30.5    30.6     RDW 17.3    17.9     Platelets 79    91       Lipid Panel        7/8/2022    17:07   Lipid Panel   Total  Cholesterol 117     Triglycerides 31     HDL Cholesterol 59     VLDL Cholesterol 9     LDL Cholesterol  49     LDL/HDL Ratio 0.88       TSH        6/20/2022    16:52 7/8/2022    17:07 8/2/2022    10:35   TSH   TSH 6.840   4.450   3.170                    Assessment and Plan   Diagnoses and all orders for this visit:    1. Hospital discharge follow-up (Primary)    2. Liver cirrhosis secondary to ROMO (nonalcoholic steatohepatitis)  Assessment & Plan:  1.  The patient's lactulose was increased to help lower his ammonia and prevent hepatic encephalopathy.  2.  The patient's pain will be addressed with the avoidance of any hepatotoxic medications such as Tylenol.  3.  The patient has been counseled about the hazards of drug use and he has been encouraged to avoid any recreational drugs.  4.  The patient's struggling to remember to take his medication on a regular basis.  Due to this it was decided to send the patient for referral to a nursing home placement.      3. Type 2 diabetes mellitus with hyperglycemia, without long-term current use of insulin  Assessment & Plan:  Diabetes is improving with treatment.   Continue current treatment regimen.  Dietary recommendations for ADA diet.  Diabetes will be reassessed in 6 months.    Orders:  -     Ambulatory Referral to Skilled Care Facility / Nursing Home  -     Comprehensive metabolic panel; Future    4. End stage liver disease  -     Ambulatory Referral to Skilled Care Facility / Nursing Home    5. Primary hypertension  Assessment & Plan:  Hypertension is improving with treatment.  Continue current treatment regimen.  Dietary sodium restriction.  Weight loss.  Blood pressure will be reassessed at the next regular appointment.      6. Other chronic pain  -     Ambulatory Referral to Skilled Care Facility / Nursing Home  -     oxyCODONE (ROXICODONE) 5 MG immediate release tablet; Take 1 tablet by mouth Every 6 (Six) Hours As Needed for Moderate Pain.  Dispense: 12 tablet;  Refill: 0    7. Blood loss anemia  -     CBC w AUTO Differential; Future  -     Iron and TIBC; Future  -     Ferritin; Future    Other orders  -     lactulose (CHRONULAC) 10 GM/15ML solution; Take 60 mL by mouth 3 (Three) Times a Day With Meals for 30 days.  Dispense: 5400 mL; Refill: 2  -     ondansetron ODT (ZOFRAN-ODT) 8 MG disintegrating tablet; Place 1 tablet on the tongue Every 8 (Eight) Hours As Needed for Nausea or Vomiting.  Dispense: 30 tablet; Refill: 2           Follow Up   Return in about 6 weeks (around 6/23/2023).  Patient was given instructions and counseling regarding his condition or for health maintenance advice. Please see specific information pulled into the AVS if appropriate.

## 2023-05-15 ENCOUNTER — PATIENT OUTREACH (OUTPATIENT)
Dept: CASE MANAGEMENT | Facility: OTHER | Age: 58
End: 2023-05-15
Payer: COMMERCIAL

## 2023-05-15 DIAGNOSIS — R29.6 FREQUENT FALLS: ICD-10-CM

## 2023-05-15 DIAGNOSIS — K74.60 CIRRHOSIS OF LIVER WITH ASCITES, UNSPECIFIED HEPATIC CIRRHOSIS TYPE: Primary | ICD-10-CM

## 2023-05-15 DIAGNOSIS — R53.1 WEAKNESS GENERALIZED: ICD-10-CM

## 2023-05-15 DIAGNOSIS — I63.9 CEREBROVASCULAR ACCIDENT (CVA), UNSPECIFIED MECHANISM: ICD-10-CM

## 2023-05-15 DIAGNOSIS — F41.9 ANXIETY: ICD-10-CM

## 2023-05-15 DIAGNOSIS — R18.8 CIRRHOSIS OF LIVER WITH ASCITES, UNSPECIFIED HEPATIC CIRRHOSIS TYPE: Primary | ICD-10-CM

## 2023-05-15 NOTE — TELEPHONE ENCOUNTER
The patient is possibly going to be admitted into [facility] and will be prescribed pain meds from either me or Dr Carcamo.     Follow-up with [facility] to see if he has been admitted. Was supposed to be going in on Monday the 15th.

## 2023-05-16 ENCOUNTER — TELEPHONE (OUTPATIENT)
Dept: ONCOLOGY | Facility: HOSPITAL | Age: 58
End: 2023-05-16

## 2023-05-17 ENCOUNTER — TRANSCRIBE ORDERS (OUTPATIENT)
Dept: ADMINISTRATIVE | Facility: HOSPITAL | Age: 58
End: 2023-05-17
Payer: COMMERCIAL

## 2023-05-17 DIAGNOSIS — D64.9 ACUTE ANEMIA: ICD-10-CM

## 2023-05-17 DIAGNOSIS — K29.71 GASTROINTESTINAL HEMORRHAGE ASSOCIATED WITH GASTRITIS, UNSPECIFIED GASTRITIS TYPE: Primary | ICD-10-CM

## 2023-05-18 ENCOUNTER — OFFICE VISIT (OUTPATIENT)
Dept: GASTROENTEROLOGY | Facility: CLINIC | Age: 58
End: 2023-05-18
Payer: COMMERCIAL

## 2023-05-18 ENCOUNTER — APPOINTMENT (OUTPATIENT)
Dept: CT IMAGING | Facility: HOSPITAL | Age: 58
End: 2023-05-18
Payer: COMMERCIAL

## 2023-05-18 ENCOUNTER — HOSPITAL ENCOUNTER (EMERGENCY)
Facility: HOSPITAL | Age: 58
Discharge: HOME | End: 2023-05-18
Attending: EMERGENCY MEDICINE
Payer: COMMERCIAL

## 2023-05-18 VITALS
DIASTOLIC BLOOD PRESSURE: 57 MMHG | WEIGHT: 280.5 LBS | HEART RATE: 90 BPM | OXYGEN SATURATION: 100 % | BODY MASS INDEX: 42.51 KG/M2 | HEIGHT: 68 IN | SYSTOLIC BLOOD PRESSURE: 145 MMHG

## 2023-05-18 VITALS
WEIGHT: 279.98 LBS | RESPIRATION RATE: 24 BRPM | HEART RATE: 84 BPM | BODY MASS INDEX: 42.43 KG/M2 | DIASTOLIC BLOOD PRESSURE: 60 MMHG | SYSTOLIC BLOOD PRESSURE: 170 MMHG | TEMPERATURE: 98.8 F | OXYGEN SATURATION: 98 % | HEIGHT: 68 IN

## 2023-05-18 DIAGNOSIS — R11.0 NAUSEA: ICD-10-CM

## 2023-05-18 DIAGNOSIS — K52.9 COLITIS: Primary | ICD-10-CM

## 2023-05-18 DIAGNOSIS — R25.2 MUSCLE CRAMPS: ICD-10-CM

## 2023-05-18 DIAGNOSIS — D64.9 ANEMIA, UNSPECIFIED TYPE: Primary | ICD-10-CM

## 2023-05-18 DIAGNOSIS — K76.82 HEPATIC ENCEPHALOPATHY: ICD-10-CM

## 2023-05-18 DIAGNOSIS — R10.84 GENERALIZED ABDOMINAL PAIN: ICD-10-CM

## 2023-05-18 DIAGNOSIS — D64.9 CHRONIC ANEMIA: ICD-10-CM

## 2023-05-18 DIAGNOSIS — K62.5 RECTAL BLEEDING: ICD-10-CM

## 2023-05-18 DIAGNOSIS — K75.81 LIVER CIRRHOSIS SECONDARY TO NASH (NONALCOHOLIC STEATOHEPATITIS): ICD-10-CM

## 2023-05-18 DIAGNOSIS — K74.60 CIRRHOSIS OF LIVER WITH ASCITES, UNSPECIFIED HEPATIC CIRRHOSIS TYPE: Primary | ICD-10-CM

## 2023-05-18 DIAGNOSIS — R18.8 CIRRHOSIS OF LIVER WITH ASCITES, UNSPECIFIED HEPATIC CIRRHOSIS TYPE: Primary | ICD-10-CM

## 2023-05-18 DIAGNOSIS — D69.6 THROMBOCYTOPENIA: ICD-10-CM

## 2023-05-18 DIAGNOSIS — E72.20 HYPERAMMONEMIA: ICD-10-CM

## 2023-05-18 DIAGNOSIS — K74.60 CIRRHOSIS OF LIVER WITH ASCITES, UNSPECIFIED HEPATIC CIRRHOSIS TYPE: ICD-10-CM

## 2023-05-18 DIAGNOSIS — K76.0 NONALCOHOLIC FATTY LIVER DISEASE: ICD-10-CM

## 2023-05-18 DIAGNOSIS — K74.60 LIVER CIRRHOSIS SECONDARY TO NASH (NONALCOHOLIC STEATOHEPATITIS): ICD-10-CM

## 2023-05-18 DIAGNOSIS — E66.01 CLASS 3 SEVERE OBESITY DUE TO EXCESS CALORIES WITH SERIOUS COMORBIDITY AND BODY MASS INDEX (BMI) OF 40.0 TO 44.9 IN ADULT: ICD-10-CM

## 2023-05-18 DIAGNOSIS — R18.8 CIRRHOSIS OF LIVER WITH ASCITES, UNSPECIFIED HEPATIC CIRRHOSIS TYPE: ICD-10-CM

## 2023-05-18 DIAGNOSIS — E11.65 UNCONTROLLED TYPE 2 DIABETES MELLITUS WITH HYPERGLYCEMIA: ICD-10-CM

## 2023-05-18 DIAGNOSIS — E11.65 TYPE 2 DIABETES MELLITUS WITH HYPERGLYCEMIA, WITHOUT LONG-TERM CURRENT USE OF INSULIN: ICD-10-CM

## 2023-05-18 LAB
ABO GROUP BLD: NORMAL
ALBUMIN SERPL-MCNC: 3.3 G/DL (ref 3.5–5.2)
ALBUMIN/GLOB SERPL: 1.2 G/DL
ALP SERPL-CCNC: 118 U/L (ref 39–117)
ALT SERPL W P-5'-P-CCNC: 22 U/L (ref 1–41)
AMMONIA BLD-SCNC: 34 UMOL/L (ref 16–60)
ANION GAP SERPL CALCULATED.3IONS-SCNC: 8.3 MMOL/L (ref 5–15)
AST SERPL-CCNC: 40 U/L (ref 1–40)
BASOPHILS # BLD AUTO: 0.02 10*3/MM3 (ref 0–0.2)
BASOPHILS NFR BLD AUTO: 0.7 % (ref 0–1.5)
BILIRUB SERPL-MCNC: 1.3 MG/DL (ref 0–1.2)
BLD GP AB SCN SERPL QL: NEGATIVE
BUN SERPL-MCNC: 13 MG/DL (ref 6–20)
BUN/CREAT SERPL: 12.5 (ref 7–25)
CALCIUM SPEC-SCNC: 8.7 MG/DL (ref 8.6–10.5)
CHLORIDE SERPL-SCNC: 104 MMOL/L (ref 98–107)
CO2 SERPL-SCNC: 21.7 MMOL/L (ref 22–29)
CREAT SERPL-MCNC: 1.04 MG/DL (ref 0.76–1.27)
D-LACTATE SERPL-SCNC: 1.5 MMOL/L (ref 0.5–2)
DEPRECATED RDW RBC AUTO: 53.1 FL (ref 37–54)
EGFRCR SERPLBLD CKD-EPI 2021: 83.7 ML/MIN/1.73
EOSINOPHIL # BLD AUTO: 0.09 10*3/MM3 (ref 0–0.4)
EOSINOPHIL NFR BLD AUTO: 3 % (ref 0.3–6.2)
ERYTHROCYTE [DISTWIDTH] IN BLOOD BY AUTOMATED COUNT: 18.2 % (ref 12.3–15.4)
GLOBULIN UR ELPH-MCNC: 2.8 GM/DL
GLUCOSE SERPL-MCNC: 222 MG/DL (ref 65–99)
HCT VFR BLD AUTO: 24.2 % (ref 37.5–51)
HGB BLD-MCNC: 7.4 G/DL (ref 13–17.7)
HOLD SPECIMEN: NORMAL
HOLD SPECIMEN: NORMAL
IMM GRANULOCYTES # BLD AUTO: 0.01 10*3/MM3 (ref 0–0.05)
IMM GRANULOCYTES NFR BLD AUTO: 0.3 % (ref 0–0.5)
INR PPP: 1.9 (ref 0.86–1.15)
LIPASE SERPL-CCNC: 42 U/L (ref 13–60)
LYMPHOCYTES # BLD AUTO: 0.42 10*3/MM3 (ref 0.7–3.1)
LYMPHOCYTES NFR BLD AUTO: 14.1 % (ref 19.6–45.3)
MCH RBC QN AUTO: 24.2 PG (ref 26.6–33)
MCHC RBC AUTO-ENTMCNC: 30.6 G/DL (ref 31.5–35.7)
MCV RBC AUTO: 79.1 FL (ref 79–97)
MONOCYTES # BLD AUTO: 0.42 10*3/MM3 (ref 0.1–0.9)
MONOCYTES NFR BLD AUTO: 14.1 % (ref 5–12)
NEUTROPHILS NFR BLD AUTO: 2.02 10*3/MM3 (ref 1.7–7)
NEUTROPHILS NFR BLD AUTO: 67.8 % (ref 42.7–76)
NRBC BLD AUTO-RTO: 0 /100 WBC (ref 0–0.2)
PLATELET # BLD AUTO: 72 10*3/MM3 (ref 140–450)
PMV BLD AUTO: 10.6 FL (ref 6–12)
POTASSIUM SERPL-SCNC: 5 MMOL/L (ref 3.5–5.2)
PROT SERPL-MCNC: 6.1 G/DL (ref 6–8.5)
PROTHROMBIN TIME: 21.7 SECONDS (ref 11.8–14.9)
RBC # BLD AUTO: 3.06 10*6/MM3 (ref 4.14–5.8)
RH BLD: POSITIVE
SODIUM SERPL-SCNC: 134 MMOL/L (ref 136–145)
T&S EXPIRATION DATE: NORMAL
WBC NRBC COR # BLD: 2.98 10*3/MM3 (ref 3.4–10.8)
WHOLE BLOOD HOLD COAG: NORMAL
WHOLE BLOOD HOLD SPECIMEN: NORMAL

## 2023-05-18 PROCEDURE — 25010000002 HYDROMORPHONE 1 MG/ML SOLUTION: Performed by: EMERGENCY MEDICINE

## 2023-05-18 PROCEDURE — 86901 BLOOD TYPING SEROLOGIC RH(D): CPT | Performed by: PHYSICIAN ASSISTANT

## 2023-05-18 PROCEDURE — 82140 ASSAY OF AMMONIA: CPT | Performed by: PHYSICIAN ASSISTANT

## 2023-05-18 PROCEDURE — 25510000001 IOPAMIDOL PER 1 ML: Performed by: PHYSICIAN ASSISTANT

## 2023-05-18 PROCEDURE — 96374 THER/PROPH/DIAG INJ IV PUSH: CPT

## 2023-05-18 PROCEDURE — 83690 ASSAY OF LIPASE: CPT

## 2023-05-18 PROCEDURE — 25010000002 ONDANSETRON PER 1 MG: Performed by: EMERGENCY MEDICINE

## 2023-05-18 PROCEDURE — 83605 ASSAY OF LACTIC ACID: CPT

## 2023-05-18 PROCEDURE — 74177 CT ABD & PELVIS W/CONTRAST: CPT

## 2023-05-18 PROCEDURE — 85025 COMPLETE CBC W/AUTO DIFF WBC: CPT

## 2023-05-18 PROCEDURE — 99283 EMERGENCY DEPT VISIT LOW MDM: CPT

## 2023-05-18 PROCEDURE — 96375 TX/PRO/DX INJ NEW DRUG ADDON: CPT

## 2023-05-18 PROCEDURE — 36415 COLL VENOUS BLD VENIPUNCTURE: CPT

## 2023-05-18 PROCEDURE — 86850 RBC ANTIBODY SCREEN: CPT | Performed by: PHYSICIAN ASSISTANT

## 2023-05-18 PROCEDURE — 85610 PROTHROMBIN TIME: CPT | Performed by: PHYSICIAN ASSISTANT

## 2023-05-18 PROCEDURE — 80053 COMPREHEN METABOLIC PANEL: CPT

## 2023-05-18 PROCEDURE — 86900 BLOOD TYPING SEROLOGIC ABO: CPT | Performed by: PHYSICIAN ASSISTANT

## 2023-05-18 RX ORDER — SODIUM CHLORIDE 0.9 % (FLUSH) 0.9 %
10 SYRINGE (ML) INJECTION AS NEEDED
Status: DISCONTINUED | OUTPATIENT
Start: 2023-05-18 | End: 2023-05-18 | Stop reason: HOSPADM

## 2023-05-18 RX ORDER — ONDANSETRON 2 MG/ML
4 INJECTION INTRAMUSCULAR; INTRAVENOUS ONCE
Status: COMPLETED | OUTPATIENT
Start: 2023-05-18 | End: 2023-05-18

## 2023-05-18 RX ORDER — AMOXICILLIN AND CLAVULANATE POTASSIUM 875; 125 MG/1; MG/1
1 TABLET, FILM COATED ORAL EVERY 12 HOURS SCHEDULED
Status: DISCONTINUED | OUTPATIENT
Start: 2023-05-18 | End: 2023-05-18 | Stop reason: HOSPADM

## 2023-05-18 RX ADMIN — ONDANSETRON 4 MG: 2 INJECTION INTRAMUSCULAR; INTRAVENOUS at 14:57

## 2023-05-18 RX ADMIN — IOPAMIDOL 100 ML: 755 INJECTION, SOLUTION INTRAVENOUS at 15:50

## 2023-05-18 RX ADMIN — HYDROMORPHONE HYDROCHLORIDE 1 MG: 1 INJECTION, SOLUTION INTRAMUSCULAR; INTRAVENOUS; SUBCUTANEOUS at 14:57

## 2023-05-18 RX ADMIN — AMOXICILLIN AND CLAVULANATE POTASSIUM 1 TABLET: 875; 125 TABLET, FILM COATED ORAL at 17:39
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
## 2023-05-18 NOTE — DISCHARGE INSTRUCTIONS
Please have your doctor recheck your hemoglobin level and platelets on Monday (recheck CBC).  Your hemoglobin was 7.4 today and your platelets were 72    Please follow-up with Dr. Roblero, your gastroenterologist next week.  Please review the CT scan that was performed today.  Please discuss possible need for colonoscopy after resolution of the symptoms to determine benign etiology of the colitis    Please continue your oxycodone at the nursing home 5 mg as needed as directed by the physician at the nursing home.    Please follow-up with your doctor Monday for serial reexamination the abdomen.  Return to the emergency room immediately for intractable pain, intractable vomiting, fever, shaking chills, muscle aches, near passing out, passing out or any new symptoms you are concerned with

## 2023-05-22 ENCOUNTER — READMISSION MANAGEMENT (OUTPATIENT)
Dept: CALL CENTER | Facility: HOSPITAL | Age: 58
End: 2023-05-22
Payer: COMMERCIAL

## 2023-05-22 NOTE — OUTREACH NOTE
Medical Week 3 Survey    Flowsheet Row Responses   [facility] patient discharged from? [facility]   Does the patient have one of the following disease processes/diagnoses(primary or secondary)? Other   Week 3 attempt successful? Yes   Call start time 1241   Revoke Change in health status-moved to LTC/SNF/Hospice  [resident of nursing home]   Call end time 1242   Discharge diagnosis GI bleed   Does the patient have a primary care provider?  Yes   Does the patient have an appointment with their PCP within 7 days of discharge? Yes   Has the patient kept scheduled appointments due by today? Yes   Home health comments Pt resides in Nursing Home now per pt.   What is the patient's perception of their health status since discharge? Same          Hermila NARANJO - Registered Nurse

## 2023-05-26 ENCOUNTER — LAB (OUTPATIENT)
Dept: ONCOLOGY | Facility: HOSPITAL | Age: 58
End: 2023-05-26
Payer: COMMERCIAL

## 2023-05-26 DIAGNOSIS — D64.9 ANEMIA, UNSPECIFIED TYPE: ICD-10-CM

## 2023-05-26 LAB
ALBUMIN SERPL-MCNC: 3.6 G/DL (ref 3.5–5.2)
ALBUMIN/GLOB SERPL: 1.2 G/DL
ALP SERPL-CCNC: 115 U/L (ref 39–117)
ALT SERPL W P-5'-P-CCNC: 23 U/L (ref 1–41)
ANION GAP SERPL CALCULATED.3IONS-SCNC: 8.8 MMOL/L (ref 5–15)
AST SERPL-CCNC: 47 U/L (ref 1–40)
BASOPHILS # BLD AUTO: 0.03 10*3/MM3 (ref 0–0.2)
BASOPHILS NFR BLD AUTO: 0.8 % (ref 0–1.5)
BILIRUB SERPL-MCNC: 1 MG/DL (ref 0–1.2)
BUN SERPL-MCNC: 14 MG/DL (ref 6–20)
BUN/CREAT SERPL: 16.9 (ref 7–25)
CALCIUM SPEC-SCNC: 8.9 MG/DL (ref 8.6–10.5)
CHLORIDE SERPL-SCNC: 104 MMOL/L (ref 98–107)
CO2 SERPL-SCNC: 22.2 MMOL/L (ref 22–29)
CREAT SERPL-MCNC: 0.83 MG/DL (ref 0.76–1.27)
DEPRECATED RDW RBC AUTO: 55.8 FL (ref 37–54)
EGFRCR SERPLBLD CKD-EPI 2021: 102.1 ML/MIN/1.73
EOSINOPHIL # BLD AUTO: 0.12 10*3/MM3 (ref 0–0.4)
EOSINOPHIL NFR BLD AUTO: 3.1 % (ref 0.3–6.2)
ERYTHROCYTE [DISTWIDTH] IN BLOOD BY AUTOMATED COUNT: 18.6 % (ref 12.3–15.4)
FERRITIN SERPL-MCNC: 27.65 NG/ML (ref 30–400)
GLOBULIN UR ELPH-MCNC: 2.9 GM/DL
GLUCOSE SERPL-MCNC: 233 MG/DL (ref 65–99)
HCT VFR BLD AUTO: 26.7 % (ref 37.5–51)
HGB BLD-MCNC: 8.1 G/DL (ref 13–17.7)
IMM GRANULOCYTES # BLD AUTO: 0.01 10*3/MM3 (ref 0–0.05)
IMM GRANULOCYTES NFR BLD AUTO: 0.3 % (ref 0–0.5)
IRON 24H UR-MRATE: 37 MCG/DL (ref 59–158)
IRON SATN MFR SERPL: 7 % (ref 20–50)
LYMPHOCYTES # BLD AUTO: 0.63 10*3/MM3 (ref 0.7–3.1)
LYMPHOCYTES NFR BLD AUTO: 16.3 % (ref 19.6–45.3)
MCH RBC QN AUTO: 24.7 PG (ref 26.6–33)
MCHC RBC AUTO-ENTMCNC: 30.3 G/DL (ref 31.5–35.7)
MCV RBC AUTO: 81.4 FL (ref 79–97)
MONOCYTES # BLD AUTO: 0.35 10*3/MM3 (ref 0.1–0.9)
MONOCYTES NFR BLD AUTO: 9.1 % (ref 5–12)
NEUTROPHILS NFR BLD AUTO: 2.72 10*3/MM3 (ref 1.7–7)
NEUTROPHILS NFR BLD AUTO: 70.4 % (ref 42.7–76)
PLATELET # BLD AUTO: 88 10*3/MM3 (ref 140–450)
PMV BLD AUTO: 10.7 FL (ref 6–12)
POTASSIUM SERPL-SCNC: 4.4 MMOL/L (ref 3.5–5.2)
PROT SERPL-MCNC: 6.5 G/DL (ref 6–8.5)
RBC # BLD AUTO: 3.28 10*6/MM3 (ref 4.14–5.8)
SODIUM SERPL-SCNC: 135 MMOL/L (ref 136–145)
TIBC SERPL-MCNC: 508 MCG/DL (ref 298–536)
TRANSFERRIN SERPL-MCNC: 341 MG/DL (ref 200–360)
WBC NRBC COR # BLD: 3.86 10*3/MM3 (ref 3.4–10.8)

## 2023-05-26 PROCEDURE — 80053 COMPREHEN METABOLIC PANEL: CPT

## 2023-05-26 PROCEDURE — 82728 ASSAY OF FERRITIN: CPT

## 2023-05-26 PROCEDURE — 85025 COMPLETE CBC W/AUTO DIFF WBC: CPT

## 2023-05-26 PROCEDURE — 84466 ASSAY OF TRANSFERRIN: CPT

## 2023-05-26 PROCEDURE — 36415 COLL VENOUS BLD VENIPUNCTURE: CPT

## 2023-05-26 PROCEDURE — 83540 ASSAY OF IRON: CPT

## 2023-05-30 ENCOUNTER — PATIENT OUTREACH (OUTPATIENT)
Dept: CASE MANAGEMENT | Facility: OTHER | Age: 58
End: 2023-05-30

## 2023-05-30 DIAGNOSIS — R53.1 WEAKNESS GENERALIZED: ICD-10-CM

## 2023-05-30 DIAGNOSIS — R18.8 CIRRHOSIS OF LIVER WITH ASCITES, UNSPECIFIED HEPATIC CIRRHOSIS TYPE: Primary | ICD-10-CM

## 2023-05-30 DIAGNOSIS — F41.9 ANXIETY: ICD-10-CM

## 2023-05-30 DIAGNOSIS — R29.6 FREQUENT FALLS: ICD-10-CM

## 2023-05-30 DIAGNOSIS — I63.9 CEREBROVASCULAR ACCIDENT (CVA), UNSPECIFIED MECHANISM: ICD-10-CM

## 2023-05-30 DIAGNOSIS — K74.60 CIRRHOSIS OF LIVER WITH ASCITES, UNSPECIFIED HEPATIC CIRRHOSIS TYPE: Primary | ICD-10-CM

## 2023-05-30 DIAGNOSIS — K76.82 HEPATIC ENCEPHALOPATHY: ICD-10-CM

## 2023-05-30 NOTE — OUTREACH NOTE
[facility] End of Month Documentation    This Chronic Medical Management Care Plan for Nic Nicolas, 58 y.o. male, has been monitored and managed; reviewed; complete and a new plan of care implemented for the month of May.  A cumulative time of 30  minutes was spent on this patient record this month, including face to face with provider; phone call with other providers; chart review.    Regarding the patient's problems: has Arthritis, senescent; Colon polyps; Liver cirrhosis secondary to ROMO (nonalcoholic steatohepatitis); Multiple episodes of deep venous thrombosis; High blood pressure; Hyperlipidemia; Other specified anemias; Sleep apnea; Systolic congestive heart failure; Bilateral lower extremity edema; Chronic cystitis; Chronic low back pain; Type 2 diabetes mellitus with hyperglycemia; Acid reflux; Acetabular fracture; Gross hematuria; Hesitancy of micturition; Gastrointestinal hemorrhage associated with peptic ulcer; Anxiety; Hepatic encephalopathy; Stroke; Amphetamine abuse; Hyperammonemia; Moderate episode of recurrent major depressive disorder; Iron deficiency anemia due to chronic blood loss; GI bleed; and Hospital discharge follow-up on their problem list., the following items were addressed: medical records and any changes can be found within the plan section of the note.  A detailed listing of time spent for chronic care management is tracked within each outreach encounter.  Current medications include:  has a current medication list which includes the following prescription(s): albuterol sulfate hfa, amoxicillin-clavulanate, atorvastatin, buspirone, cetirizine, ferrous sulfate, fluticasone, fluticasone, freestyle lite, furosemide, hydrocodone-acetaminophen, levemir flexpen, lactulose, lactulose, freestyle, magnesium oxide, naloxone, omeprazole, ondansetron odt, oxycodone, oxycodone-acetaminophen, polyethyl glycol-propyl glycol, rifaximin, ropinirole, sertraline, spironolactone, tranexamic acid,  trazodone, vitamin d, and [DISCONTINUED] fluticasone. and the patient is reported to be patient is compliant with medication protocol, reports compliance and uses pill pack from pharmacy but may not always remember to take per patient discussion,  Medications are reported to be effective.  Regarding these diagnoses, referrals were made to the following provider(s):  Admit to hospital and discharged to LTC.  All notes on chart for PCP to review.    The patient was monitored remotely for medications; pain; activity level, finally admitted to LTC.    The patient's physical needs include:  help taking medications as prescribed; health care coverage; needs assistance with ADLs; resources for disability needs; physical healthcare.     The patient's mental support needs include:  coordination of community providers; increased support    The patient's cognitive support needs include:  coordination of community providers; increased support    The patient's psychosocial support needs include:  need for increased support; coordination of community providers    The patient's functional needs include: needs assistance for ADLs    The patient's environmental needs include:  an unsafe living environment, falls and inability to care for self, admitted to LTC    Care Plan overall comments:  completed    Refer to previous outreach notes for more information on the areas listed above.    Monthly Billing Diagnoses  (K74.60,  R18.8) Cirrhosis of liver with ascites, unspecified hepatic cirrhosis type    (I63.9) Cerebrovascular accident (CVA), unspecified mechanism    (R53.1) Weakness generalized    (F41.9) Anxiety    (R29.6) Frequent falls    (K76.82) Hepatic encephalopathy (HCC)    Medications   · Medications have been reconciled    Care Plan progress this month:      Recently Modified Care Plans Updates made since 4/29/2023 12:00 AM     Fall Risk (Adult)         Problem Priority Last Modified     ELS FALL RISK (ADULT) --  5/15/2023   2:43 PM by Yazmin Rodrigeuz, USMAN              Goal Recent Progress Last Modified     Absence of Fall and Fall-Related Injury On track  5/15/2023  2:43 PM by Yazmin Rodriguez RN     Evidence-based guidance:   Assess fall risk using a validated tool when available. Consider balance and gait impairment, muscle weakness, diminished vision or hearing, environmental hazards, presence of urinary or bowel urgency and/or incontinence.   Communicate fall injury risk to interprofessional healthcare team.   Develop a fall prevention plan with the patient and family.   Promote use of personal vision and auditory aids.   Promote reorientation, appropriate sensory stimulation, and routines to decrease risk of fall when changes in mental status are present.   Assess assistance level required for safe and effective self-care; consider referral for home care.   Encourage physical activity, such as performance of self-care at highest level of ability, strength and balance exercise program, and provision of appropriate assistive devices; refer to rehabilitation therapy.   Refer to community-based fall prevention program where available.   If fall occurs, determine the cause and revise fall injury prevention plan.   Regularly review medication contribution to fall risk; consider risk related to polypharmacy and age.   Refer to pharmacist for consultation when concerns about medications are revealed.   Balance adequate pain management with potential for oversedation.   Provide guidance related to environmental modifications.   Consider supplementation with Vitamin D.    Notes: 4/17 has had a few falls at home but no injuries, has cane and walker and is requesting to be sent to LTC for living there due to inability to care for self any longer. He is to discuss with PCP today.     5/15/23 was admitted today to LTC at [facility] Due Date Last Modified     Identify and Manage Contributors to Fall Risk --  5/15/2023  2:43 PM  "by Yazmin Rodriguez RN     Care Management Activities:      - assistive or adaptive device use encouraged  - barriers to safety identified  - cognition assessed  - medication list reviewed  - participation in rehabilitation therapy encouraged      Notes: 3/22/23 DENIES ANY HOME ISSUES OR NEEDS AT THIS OUTREACH  4/17/23 he would like referral to NH in [place] for permanent placement   5/15/23 admitted to [place]                Frailty Syndrome (Adult)         Problem Priority Last Modified     ELS DISEASE PROGRESSION (FRAILTY) (ADULT) --  5/15/2023  2:42 PM by Yazmin Rodriguez RN              Goal Recent Progress Last Modified     Disease Progression Prevented or Minimized On track  5/15/2023  2:41 PM by Yazmin Rodriguez RN     Evidence-based guidance:   Anticipate referral to pharmacist for recognition of exposure to potentially inappropriate medication, such as aspirin, statin, antihypertensive, diuretic, bisphosphonate, analgesic, laxative and antidepressant, hypnotic.   Assess for presence of frailty indicators.   Develop individualized, interprofessional combination therapy plan that may slow progression of frailty.   Facilitate opportunity for shared decision-making regarding individualized medication de-prescribing plan; include plan for weaning when necessary.   Prevent infection by use of skin and hand hygiene, as well as oral and dental care; avoid aspiration and change position frequently; receive flu and pneumonia vaccines.   Review medication list for those that may contribute to the development of pneumonia, such as antipsychotic or sedative and those that cause dry mouth or achlorhydria.   Promote a regular daily exercise program based on tolerance, ability and patient choice that will support positive thinking about disease or aging process and reduce fear of falling.   Provide frequent encouragement and praise that supports positive view of aging; avoid use of term \"frailty\" that contributes to " negative self-regard.    Notes: 4/17/23 reports he is declining phyeically and mentally and wants to live in assisted living or NH will d/w PCP today    5/15 admitted today to LTC [facility]             Task Due Date Last Modified     Alleviate Barriers to Frailty Treatment --  5/15/2023  2:41 PM by Yazmin Rodriguez RN     Care Management Activities:      - activity or exercise based on tolerance encouraged  - communicable disease prevention promoted  - medication list reviewed      Notes: 5/15 admitted to LTC                     · Current Specialty Plan of Care Status signed by both patient and provider    Instructions   · Patient was provided an electronic copy of care plan  · CCM services were explained and offered and patient has accepted these services.  · Patient has given their written consent to receive CCM services and understands that this includes the authorization of electronic communication of medical information with the other treating providers.  · Patient understands that they may stop CCM services at any time and these changes will be effective at the end of the calendar month and will effectively revocate the agreement of CCM services.  · Patient understands that only one practitioner can furnish and be paid for CCM services during one calendar month.  Patient also understands that there may be co-payment or deductible fees in association with CCM services.  · Patient will continue with at least monthly follow-up calls with the Ambulatory .    Yazmin NICOLE  Ambulatory Case Management    5/30/2023, 14:48 EDT

## 2023-06-06 ENCOUNTER — HOSPITAL ENCOUNTER (OUTPATIENT)
Dept: ULTRASOUND IMAGING | Facility: HOSPITAL | Age: 58
Discharge: HOME | End: 2023-06-06
Admitting: NURSE PRACTITIONER
Payer: COMMERCIAL

## 2023-06-06 ENCOUNTER — TELEPHONE (OUTPATIENT)
Dept: GASTROENTEROLOGY | Facility: CLINIC | Age: 58
End: 2023-06-06
Payer: COMMERCIAL

## 2023-06-06 DIAGNOSIS — K74.60 LIVER CIRRHOSIS SECONDARY TO NASH (NONALCOHOLIC STEATOHEPATITIS): Chronic | ICD-10-CM

## 2023-06-06 DIAGNOSIS — K74.60 CIRRHOSIS OF LIVER WITH ASCITES, UNSPECIFIED HEPATIC CIRRHOSIS TYPE: ICD-10-CM

## 2023-06-06 DIAGNOSIS — R18.8 CIRRHOSIS OF LIVER WITH ASCITES, UNSPECIFIED HEPATIC CIRRHOSIS TYPE: Primary | ICD-10-CM

## 2023-06-06 DIAGNOSIS — K75.81 LIVER CIRRHOSIS SECONDARY TO NASH (NONALCOHOLIC STEATOHEPATITIS): Chronic | ICD-10-CM

## 2023-06-06 DIAGNOSIS — R18.8 CIRRHOSIS OF LIVER WITH ASCITES, UNSPECIFIED HEPATIC CIRRHOSIS TYPE: ICD-10-CM

## 2023-06-06 DIAGNOSIS — K74.60 CIRRHOSIS OF LIVER WITH ASCITES, UNSPECIFIED HEPATIC CIRRHOSIS TYPE: Primary | ICD-10-CM

## 2023-06-06 PROCEDURE — 76705 ECHO EXAM OF ABDOMEN: CPT

## 2023-06-06 NOTE — TELEPHONE ENCOUNTER
I left patient a voicemail to return my call for results. I left a call back number. Order placed for 6 months.

## 2023-06-06 NOTE — TELEPHONE ENCOUNTER
----- Message from BESSIE Díaz sent at 6/6/2023 12:34 PM EDT -----  Cirrhosis and splenomegaly.  No liver masses noted.  Repeat 6 months.

## 2023-06-09 RX ORDER — FUROSEMIDE 40 MG/1
TABLET ORAL
Qty: 60 TABLET | Refills: 3 | Status: SHIPPED | OUTPATIENT
Start: 2023-06-09

## 2023-06-12 ENCOUNTER — HOSPITAL ENCOUNTER (OUTPATIENT)
Dept: ONCOLOGY | Facility: HOSPITAL | Age: 58
Discharge: HOME | End: 2023-06-12
Admitting: INTERNAL MEDICINE
Payer: COMMERCIAL

## 2023-06-12 VITALS
RESPIRATION RATE: 20 BRPM | DIASTOLIC BLOOD PRESSURE: 78 MMHG | TEMPERATURE: 97.8 F | OXYGEN SATURATION: 97 % | SYSTOLIC BLOOD PRESSURE: 185 MMHG | HEART RATE: 89 BPM

## 2023-06-12 DIAGNOSIS — K27.4 GASTROINTESTINAL HEMORRHAGE ASSOCIATED WITH PEPTIC ULCER: Primary | ICD-10-CM

## 2023-06-12 DIAGNOSIS — D50.0 IRON DEFICIENCY ANEMIA DUE TO CHRONIC BLOOD LOSS: ICD-10-CM

## 2023-06-12 PROCEDURE — 96375 TX/PRO/DX INJ NEW DRUG ADDON: CPT

## 2023-06-12 PROCEDURE — 25010000002 NA FERRIC GLUC CPLX PER 12.5 MG: Performed by: INTERNAL MEDICINE

## 2023-06-12 PROCEDURE — 96366 THER/PROPH/DIAG IV INF ADDON: CPT

## 2023-06-12 PROCEDURE — 25010000002 DEXAMETHASONE PER 1 MG: Performed by: INTERNAL MEDICINE

## 2023-06-12 PROCEDURE — 96365 THER/PROPH/DIAG IV INF INIT: CPT

## 2023-06-12 PROCEDURE — 25010000002 DIPHENHYDRAMINE PER 50 MG: Performed by: INTERNAL MEDICINE

## 2023-06-12 RX ORDER — ACETAMINOPHEN 325 MG/1
650 TABLET ORAL ONCE
Status: COMPLETED | OUTPATIENT
Start: 2023-06-12 | End: 2023-06-12

## 2023-06-12 RX ORDER — SODIUM CHLORIDE 9 MG/ML
250 INJECTION, SOLUTION INTRAVENOUS ONCE
Status: COMPLETED | OUTPATIENT
Start: 2023-06-12 | End: 2023-06-12

## 2023-06-12 RX ORDER — DEXAMETHASONE SODIUM PHOSPHATE 4 MG/ML
4 INJECTION, SOLUTION INTRA-ARTICULAR; INTRALESIONAL; INTRAMUSCULAR; INTRAVENOUS; SOFT TISSUE ONCE
Status: COMPLETED | OUTPATIENT
Start: 2023-06-12 | End: 2023-06-12

## 2023-06-12 RX ADMIN — DEXAMETHASONE SODIUM PHOSPHATE 4 MG: 4 INJECTION, SOLUTION INTRA-ARTICULAR; INTRALESIONAL; INTRAMUSCULAR; INTRAVENOUS; SOFT TISSUE at 13:48

## 2023-06-12 RX ADMIN — SODIUM CHLORIDE 250 MG: 9 INJECTION, SOLUTION INTRAVENOUS at 14:11

## 2023-06-12 RX ADMIN — SODIUM CHLORIDE 250 ML: 9 INJECTION, SOLUTION INTRAVENOUS at 13:45

## 2023-06-12 RX ADMIN — DIPHENHYDRAMINE HYDROCHLORIDE 25 MG: 50 INJECTION, SOLUTION INTRAMUSCULAR; INTRAVENOUS at 13:51

## 2023-06-12 RX ADMIN — ACETAMINOPHEN 650 MG: 325 TABLET ORAL at 13:47

## 2023-06-16 ENCOUNTER — PATIENT OUTREACH (OUTPATIENT)
Dept: CASE MANAGEMENT | Facility: OTHER | Age: 58
End: 2023-06-16
Payer: COMMERCIAL

## 2023-06-16 DIAGNOSIS — I63.9 CEREBROVASCULAR ACCIDENT (CVA), UNSPECIFIED MECHANISM: ICD-10-CM

## 2023-06-16 DIAGNOSIS — R18.8 CIRRHOSIS OF LIVER WITH ASCITES, UNSPECIFIED HEPATIC CIRRHOSIS TYPE: Primary | ICD-10-CM

## 2023-06-16 DIAGNOSIS — K74.60 CIRRHOSIS OF LIVER WITH ASCITES, UNSPECIFIED HEPATIC CIRRHOSIS TYPE: Primary | ICD-10-CM

## 2023-06-16 DIAGNOSIS — R53.1 WEAKNESS GENERALIZED: ICD-10-CM

## 2023-06-16 NOTE — OUTREACH NOTE
AMBULATORY CASE MANAGEMENT NOTE    Name and Relationship of Patient/Support Person: [facility] - Other    SNF Follow-up    Spoke with Shanti at [facility]. Patient remains resident in LTC and plan is permanent residence there. She reports he is doing fabulous. PCP updated. Closing out CCM program.              Yazmin NICOLE  Ambulatory Case Management    6/16/2023, 11:29 EDT

## 2023-06-19 ENCOUNTER — HOSPITAL ENCOUNTER (OUTPATIENT)
Dept: ONCOLOGY | Facility: HOSPITAL | Age: 58
Discharge: HOME | End: 2023-06-19
Admitting: INTERNAL MEDICINE
Payer: MEDICAID

## 2023-06-19 VITALS
HEART RATE: 100 BPM | SYSTOLIC BLOOD PRESSURE: 143 MMHG | OXYGEN SATURATION: 98 % | RESPIRATION RATE: 17 BRPM | TEMPERATURE: 98.2 F | DIASTOLIC BLOOD PRESSURE: 62 MMHG

## 2023-06-19 DIAGNOSIS — D50.0 IRON DEFICIENCY ANEMIA DUE TO CHRONIC BLOOD LOSS: ICD-10-CM

## 2023-06-19 DIAGNOSIS — K27.4 GASTROINTESTINAL HEMORRHAGE ASSOCIATED WITH PEPTIC ULCER: Primary | ICD-10-CM

## 2023-06-19 PROCEDURE — 96366 THER/PROPH/DIAG IV INF ADDON: CPT

## 2023-06-19 PROCEDURE — 25010000002 DEXAMETHASONE PER 1 MG: Performed by: INTERNAL MEDICINE

## 2023-06-19 PROCEDURE — 96375 TX/PRO/DX INJ NEW DRUG ADDON: CPT

## 2023-06-19 PROCEDURE — 25010000002 DIPHENHYDRAMINE PER 50 MG: Performed by: INTERNAL MEDICINE

## 2023-06-19 PROCEDURE — 25010000002 NA FERRIC GLUC CPLX PER 12.5 MG: Performed by: INTERNAL MEDICINE

## 2023-06-19 PROCEDURE — 96365 THER/PROPH/DIAG IV INF INIT: CPT

## 2023-06-19 RX ORDER — ACETAMINOPHEN 325 MG/1
650 TABLET ORAL ONCE
Status: COMPLETED | OUTPATIENT
Start: 2023-06-19 | End: 2023-06-19

## 2023-06-19 RX ORDER — DEXAMETHASONE SODIUM PHOSPHATE 4 MG/ML
4 INJECTION, SOLUTION INTRA-ARTICULAR; INTRALESIONAL; INTRAMUSCULAR; INTRAVENOUS; SOFT TISSUE ONCE
Status: COMPLETED | OUTPATIENT
Start: 2023-06-19 | End: 2023-06-19

## 2023-06-19 RX ORDER — SODIUM CHLORIDE 9 MG/ML
250 INJECTION, SOLUTION INTRAVENOUS ONCE
Status: COMPLETED | OUTPATIENT
Start: 2023-06-19 | End: 2023-06-19

## 2023-06-19 RX ORDER — OXYCODONE HYDROCHLORIDE 10 MG/1
10 TABLET ORAL EVERY 4 HOURS PRN
COMMUNITY

## 2023-06-19 RX ADMIN — SODIUM CHLORIDE 250 ML: 9 INJECTION, SOLUTION INTRAVENOUS at 13:18

## 2023-06-19 RX ADMIN — DIPHENHYDRAMINE HYDROCHLORIDE 25 MG: 50 INJECTION, SOLUTION INTRAMUSCULAR; INTRAVENOUS at 13:30

## 2023-06-19 RX ADMIN — DEXAMETHASONE SODIUM PHOSPHATE 4 MG: 4 INJECTION, SOLUTION INTRA-ARTICULAR; INTRALESIONAL; INTRAMUSCULAR; INTRAVENOUS; SOFT TISSUE at 13:28

## 2023-06-19 RX ADMIN — ACETAMINOPHEN 650 MG: 325 TABLET ORAL at 13:27

## 2023-06-19 RX ADMIN — SODIUM CHLORIDE 250 MG: 9 INJECTION, SOLUTION INTRAVENOUS at 13:58

## 2023-07-28 ENCOUNTER — LAB (OUTPATIENT)
Dept: ONCOLOGY | Facility: HOSPITAL | Age: 58
End: 2023-07-28
Payer: MEDICAID

## 2023-07-28 ENCOUNTER — OFFICE VISIT (OUTPATIENT)
Dept: ONCOLOGY | Facility: HOSPITAL | Age: 58
End: 2023-07-28
Payer: MEDICAID

## 2023-07-28 VITALS
DIASTOLIC BLOOD PRESSURE: 100 MMHG | BODY MASS INDEX: 44.48 KG/M2 | SYSTOLIC BLOOD PRESSURE: 143 MMHG | WEIGHT: 292.55 LBS | OXYGEN SATURATION: 96 % | TEMPERATURE: 98.3 F | HEART RATE: 95 BPM | RESPIRATION RATE: 16 BRPM

## 2023-07-28 DIAGNOSIS — K74.60 LIVER CIRRHOSIS SECONDARY TO NASH (NONALCOHOLIC STEATOHEPATITIS): Chronic | ICD-10-CM

## 2023-07-28 DIAGNOSIS — K75.81 LIVER CIRRHOSIS SECONDARY TO NASH (NONALCOHOLIC STEATOHEPATITIS): Chronic | ICD-10-CM

## 2023-07-28 DIAGNOSIS — D50.0 IRON DEFICIENCY ANEMIA DUE TO CHRONIC BLOOD LOSS: Primary | ICD-10-CM

## 2023-07-28 DIAGNOSIS — D50.0 IRON DEFICIENCY ANEMIA DUE TO CHRONIC BLOOD LOSS: ICD-10-CM

## 2023-07-28 DIAGNOSIS — D69.6 THROMBOCYTOPENIA: ICD-10-CM

## 2023-07-28 LAB
BASOPHILS # BLD AUTO: 0.03 10*3/MM3 (ref 0–0.2)
BASOPHILS NFR BLD AUTO: 0.6 % (ref 0–1.5)
DEPRECATED RDW RBC AUTO: 68.6 FL (ref 37–54)
EOSINOPHIL # BLD AUTO: 0.19 10*3/MM3 (ref 0–0.4)
EOSINOPHIL NFR BLD AUTO: 3.7 % (ref 0.3–6.2)
ERYTHROCYTE [DISTWIDTH] IN BLOOD BY AUTOMATED COUNT: 21.2 % (ref 12.3–15.4)
HCT VFR BLD AUTO: 31.3 % (ref 37.5–51)
HGB BLD-MCNC: 9.5 G/DL (ref 13–17.7)
IMM GRANULOCYTES # BLD AUTO: 0.02 10*3/MM3 (ref 0–0.05)
IMM GRANULOCYTES NFR BLD AUTO: 0.4 % (ref 0–0.5)
LYMPHOCYTES # BLD AUTO: 0.61 10*3/MM3 (ref 0.7–3.1)
LYMPHOCYTES NFR BLD AUTO: 11.9 % (ref 19.6–45.3)
MCH RBC QN AUTO: 26.6 PG (ref 26.6–33)
MCHC RBC AUTO-ENTMCNC: 30.4 G/DL (ref 31.5–35.7)
MCV RBC AUTO: 87.7 FL (ref 79–97)
MONOCYTES # BLD AUTO: 0.56 10*3/MM3 (ref 0.1–0.9)
MONOCYTES NFR BLD AUTO: 11 % (ref 5–12)
NEUTROPHILS NFR BLD AUTO: 3.7 10*3/MM3 (ref 1.7–7)
NEUTROPHILS NFR BLD AUTO: 72.4 % (ref 42.7–76)
PLATELET # BLD AUTO: 85 10*3/MM3 (ref 140–450)
PMV BLD AUTO: 11.6 FL (ref 6–12)
RBC # BLD AUTO: 3.57 10*6/MM3 (ref 4.14–5.8)
WBC NRBC COR # BLD: 5.11 10*3/MM3 (ref 3.4–10.8)

## 2023-07-28 PROCEDURE — G0463 HOSPITAL OUTPT CLINIC VISIT: HCPCS | Performed by: INTERNAL MEDICINE

## 2023-07-28 PROCEDURE — 85025 COMPLETE CBC W/AUTO DIFF WBC: CPT

## 2023-07-28 PROCEDURE — 36415 COLL VENOUS BLD VENIPUNCTURE: CPT
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
## 2023-08-17 ENCOUNTER — OFFICE VISIT (OUTPATIENT)
Dept: PODIATRY | Facility: CLINIC | Age: 58
End: 2023-08-17
Payer: MEDICAID

## 2023-08-17 VITALS
DIASTOLIC BLOOD PRESSURE: 81 MMHG | BODY MASS INDEX: 44.25 KG/M2 | OXYGEN SATURATION: 96 % | WEIGHT: 292 LBS | SYSTOLIC BLOOD PRESSURE: 153 MMHG | HEIGHT: 68 IN | HEART RATE: 96 BPM | TEMPERATURE: 97 F

## 2023-08-17 DIAGNOSIS — E11.65 TYPE 2 DIABETES MELLITUS WITH HYPERGLYCEMIA, WITH LONG-TERM CURRENT USE OF INSULIN: ICD-10-CM

## 2023-08-17 DIAGNOSIS — Z79.4 TYPE 2 DIABETES MELLITUS WITH HYPERGLYCEMIA, WITH LONG-TERM CURRENT USE OF INSULIN: ICD-10-CM

## 2023-08-17 DIAGNOSIS — M79.671 RIGHT FOOT PAIN: ICD-10-CM

## 2023-08-17 DIAGNOSIS — B35.1 ONYCHOMYCOSIS: Primary | ICD-10-CM

## 2023-08-17 DIAGNOSIS — L60.0 INGROWN TOENAIL: ICD-10-CM

## 2023-08-17 RX ORDER — GABAPENTIN 100 MG/1
100 CAPSULE ORAL 2 TIMES DAILY
COMMUNITY

## 2023-08-17 RX ORDER — IPRATROPIUM BROMIDE AND ALBUTEROL SULFATE 2.5; .5 MG/3ML; MG/3ML
3 SOLUTION RESPIRATORY (INHALATION) EVERY 4 HOURS PRN
COMMUNITY

## 2023-08-17 RX ORDER — ONDANSETRON 4 MG/1
4 TABLET, ORALLY DISINTEGRATING ORAL EVERY 8 HOURS PRN
COMMUNITY

## 2023-08-17 RX ORDER — DIPHENHYDRAMINE HCL 25 MG
25 CAPSULE ORAL EVERY 6 HOURS PRN
COMMUNITY

## 2023-08-17 RX ORDER — INSULIN LISPRO 100 [IU]/ML
INJECTION, SOLUTION INTRAVENOUS; SUBCUTANEOUS 3 TIMES DAILY PRN
COMMUNITY
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
## 2023-09-28 ENCOUNTER — LAB (OUTPATIENT)
Dept: ONCOLOGY | Facility: HOSPITAL | Age: 58
End: 2023-09-28
Payer: MEDICAID

## 2023-09-28 ENCOUNTER — OFFICE VISIT (OUTPATIENT)
Dept: ONCOLOGY | Facility: HOSPITAL | Age: 58
End: 2023-09-28
Payer: MEDICAID

## 2023-09-28 VITALS
DIASTOLIC BLOOD PRESSURE: 89 MMHG | BODY MASS INDEX: 44.25 KG/M2 | SYSTOLIC BLOOD PRESSURE: 172 MMHG | HEART RATE: 89 BPM | WEIGHT: 291.01 LBS | OXYGEN SATURATION: 97 % | RESPIRATION RATE: 16 BRPM | TEMPERATURE: 97.4 F

## 2023-09-28 DIAGNOSIS — D50.0 IRON DEFICIENCY ANEMIA DUE TO CHRONIC BLOOD LOSS: ICD-10-CM

## 2023-09-28 DIAGNOSIS — D50.0 IRON DEFICIENCY ANEMIA DUE TO CHRONIC BLOOD LOSS: Primary | ICD-10-CM

## 2023-09-28 DIAGNOSIS — R25.2 MUSCLE CRAMPS: ICD-10-CM

## 2023-09-28 DIAGNOSIS — D69.6 THROMBOCYTOPENIA: ICD-10-CM

## 2023-09-28 LAB
ALBUMIN SERPL-MCNC: 3.3 G/DL (ref 3.5–5.2)
ALBUMIN/GLOB SERPL: 1.3 G/DL
ALP SERPL-CCNC: 117 U/L (ref 39–117)
ALT SERPL W P-5'-P-CCNC: 27 U/L (ref 1–41)
ANION GAP SERPL CALCULATED.3IONS-SCNC: 6.2 MMOL/L (ref 5–15)
AST SERPL-CCNC: 44 U/L (ref 1–40)
BASOPHILS # BLD AUTO: 0.04 10*3/MM3 (ref 0–0.2)
BASOPHILS NFR BLD AUTO: 0.8 % (ref 0–1.5)
BILIRUB SERPL-MCNC: 1 MG/DL (ref 0–1.2)
BUN SERPL-MCNC: 15 MG/DL (ref 6–20)
BUN/CREAT SERPL: 16 (ref 7–25)
CALCIUM SPEC-SCNC: 9.1 MG/DL (ref 8.6–10.5)
CHLORIDE SERPL-SCNC: 102 MMOL/L (ref 98–107)
CO2 SERPL-SCNC: 23.8 MMOL/L (ref 22–29)
CREAT SERPL-MCNC: 0.94 MG/DL (ref 0.76–1.27)
DEPRECATED RDW RBC AUTO: 53.2 FL (ref 37–54)
EGFRCR SERPLBLD CKD-EPI 2021: 94.6 ML/MIN/1.73
EOSINOPHIL # BLD AUTO: 0.18 10*3/MM3 (ref 0–0.4)
EOSINOPHIL NFR BLD AUTO: 3.5 % (ref 0.3–6.2)
ERYTHROCYTE [DISTWIDTH] IN BLOOD BY AUTOMATED COUNT: 15.8 % (ref 12.3–15.4)
FERRITIN SERPL-MCNC: 46.5 NG/ML (ref 30–400)
GLOBULIN UR ELPH-MCNC: 2.5 GM/DL
GLUCOSE SERPL-MCNC: 185 MG/DL (ref 65–99)
HCT VFR BLD AUTO: 31.7 % (ref 37.5–51)
HGB BLD-MCNC: 10.2 G/DL (ref 13–17.7)
IMM GRANULOCYTES # BLD AUTO: 0.01 10*3/MM3 (ref 0–0.05)
IMM GRANULOCYTES NFR BLD AUTO: 0.2 % (ref 0–0.5)
IRON 24H UR-MRATE: 47 MCG/DL (ref 59–158)
IRON SATN MFR SERPL: 12 % (ref 20–50)
LYMPHOCYTES # BLD AUTO: 0.72 10*3/MM3 (ref 0.7–3.1)
LYMPHOCYTES NFR BLD AUTO: 14.1 % (ref 19.6–45.3)
MAGNESIUM SERPL-MCNC: 1.9 MG/DL (ref 1.6–2.6)
MCH RBC QN AUTO: 29.1 PG (ref 26.6–33)
MCHC RBC AUTO-ENTMCNC: 32.2 G/DL (ref 31.5–35.7)
MCV RBC AUTO: 90.3 FL (ref 79–97)
MONOCYTES # BLD AUTO: 0.71 10*3/MM3 (ref 0.1–0.9)
MONOCYTES NFR BLD AUTO: 13.9 % (ref 5–12)
NEUTROPHILS NFR BLD AUTO: 3.45 10*3/MM3 (ref 1.7–7)
NEUTROPHILS NFR BLD AUTO: 67.5 % (ref 42.7–76)
PLATELET # BLD AUTO: 76 10*3/MM3 (ref 140–450)
PMV BLD AUTO: 10.7 FL (ref 6–12)
POTASSIUM SERPL-SCNC: 5 MMOL/L (ref 3.5–5.2)
PROT SERPL-MCNC: 5.8 G/DL (ref 6–8.5)
RBC # BLD AUTO: 3.51 10*6/MM3 (ref 4.14–5.8)
SODIUM SERPL-SCNC: 132 MMOL/L (ref 136–145)
TIBC SERPL-MCNC: 390 MCG/DL (ref 298–536)
TRANSFERRIN SERPL-MCNC: 262 MG/DL (ref 200–360)
WBC NRBC COR # BLD: 5.11 10*3/MM3 (ref 3.4–10.8)

## 2023-09-28 PROCEDURE — 80053 COMPREHEN METABOLIC PANEL: CPT

## 2023-09-28 PROCEDURE — G0463 HOSPITAL OUTPT CLINIC VISIT: HCPCS | Performed by: INTERNAL MEDICINE

## 2023-09-28 PROCEDURE — 83735 ASSAY OF MAGNESIUM: CPT

## 2023-09-28 PROCEDURE — 83540 ASSAY OF IRON: CPT

## 2023-09-28 PROCEDURE — 36415 COLL VENOUS BLD VENIPUNCTURE: CPT

## 2023-09-28 PROCEDURE — 85025 COMPLETE CBC W/AUTO DIFF WBC: CPT

## 2023-09-28 PROCEDURE — 84466 ASSAY OF TRANSFERRIN: CPT

## 2023-09-28 PROCEDURE — 82728 ASSAY OF FERRITIN: CPT

## 2023-09-28 RX ORDER — MAGNESIUM OXIDE 400 MG/1
400 TABLET ORAL DAILY
Qty: 90 TABLET | Refills: 1 | Status: SHIPPED | OUTPATIENT
Start: 2023-09-28
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
## 2023-10-05 DIAGNOSIS — D50.0 IRON DEFICIENCY ANEMIA DUE TO CHRONIC BLOOD LOSS: Primary | ICD-10-CM

## 2023-11-30 ENCOUNTER — TRANSCRIBE ORDERS (OUTPATIENT)
Dept: ADMINISTRATIVE | Facility: HOSPITAL | Age: 58
End: 2023-11-30
Payer: MEDICAID

## 2023-11-30 DIAGNOSIS — K75.81 NONALCOHOLIC STEATOHEPATITIS: Primary | ICD-10-CM

## 2023-12-01 ENCOUNTER — APPOINTMENT (OUTPATIENT)
Dept: GENERAL RADIOLOGY | Facility: HOSPITAL | Age: 58
End: 2023-12-01
Payer: MEDICAID

## 2023-12-01 ENCOUNTER — HOSPITAL ENCOUNTER (EMERGENCY)
Facility: HOSPITAL | Age: 58
Discharge: HOME | End: 2023-12-01
Attending: EMERGENCY MEDICINE
Payer: MEDICAID

## 2023-12-01 ENCOUNTER — APPOINTMENT (OUTPATIENT)
Dept: CT IMAGING | Facility: HOSPITAL | Age: 58
End: 2023-12-01
Payer: MEDICAID

## 2023-12-01 VITALS
TEMPERATURE: 97.8 F | SYSTOLIC BLOOD PRESSURE: 153 MMHG | HEIGHT: 68 IN | HEART RATE: 85 BPM | BODY MASS INDEX: 47.74 KG/M2 | WEIGHT: 315 LBS | DIASTOLIC BLOOD PRESSURE: 59 MMHG | OXYGEN SATURATION: 97 % | RESPIRATION RATE: 18 BRPM

## 2023-12-01 DIAGNOSIS — R60.1 ANASARCA: Primary | ICD-10-CM

## 2023-12-01 DIAGNOSIS — D64.9 ANEMIA, UNSPECIFIED TYPE: ICD-10-CM

## 2023-12-01 LAB
ALBUMIN SERPL-MCNC: 3.2 G/DL (ref 3.5–5.2)
ALBUMIN/GLOB SERPL: 1.1 G/DL
ALP SERPL-CCNC: 110 U/L (ref 39–117)
ALT SERPL W P-5'-P-CCNC: 27 U/L (ref 1–41)
AMMONIA BLD-SCNC: 67 UMOL/L (ref 16–60)
ANION GAP SERPL CALCULATED.3IONS-SCNC: 10.1 MMOL/L (ref 5–15)
AST SERPL-CCNC: 54 U/L (ref 1–40)
BASOPHILS # BLD AUTO: 0.03 10*3/MM3 (ref 0–0.2)
BASOPHILS NFR BLD AUTO: 0.8 % (ref 0–1.5)
BILIRUB SERPL-MCNC: 1.2 MG/DL (ref 0–1.2)
BILIRUB UR QL STRIP: NEGATIVE
BUN SERPL-MCNC: 15 MG/DL (ref 6–20)
BUN/CREAT SERPL: 19.5 (ref 7–25)
CALCIUM SPEC-SCNC: 9.1 MG/DL (ref 8.6–10.5)
CHLORIDE SERPL-SCNC: 102 MMOL/L (ref 98–107)
CLARITY UR: CLEAR
CO2 SERPL-SCNC: 22.9 MMOL/L (ref 22–29)
COLOR UR: YELLOW
CREAT SERPL-MCNC: 0.77 MG/DL (ref 0.76–1.27)
D-LACTATE SERPL-SCNC: 1.1 MMOL/L (ref 0.5–2)
DEPRECATED RDW RBC AUTO: 45.5 FL (ref 37–54)
EGFRCR SERPLBLD CKD-EPI 2021: 104.4 ML/MIN/1.73
EOSINOPHIL # BLD AUTO: 0.15 10*3/MM3 (ref 0–0.4)
EOSINOPHIL NFR BLD AUTO: 3.8 % (ref 0.3–6.2)
ERYTHROCYTE [DISTWIDTH] IN BLOOD BY AUTOMATED COUNT: 14.5 % (ref 12.3–15.4)
FLUAV SUBTYP SPEC NAA+PROBE: NOT DETECTED
FLUBV RNA ISLT QL NAA+PROBE: NOT DETECTED
GLOBULIN UR ELPH-MCNC: 2.8 GM/DL
GLUCOSE SERPL-MCNC: 166 MG/DL (ref 65–99)
GLUCOSE UR STRIP-MCNC: NEGATIVE MG/DL
HCT VFR BLD AUTO: 25 % (ref 37.5–51)
HGB BLD-MCNC: 7.9 G/DL (ref 13–17.7)
HGB UR QL STRIP.AUTO: NEGATIVE
HOLD SPECIMEN: NORMAL
HOLD SPECIMEN: NORMAL
IMM GRANULOCYTES # BLD AUTO: 0.01 10*3/MM3 (ref 0–0.05)
IMM GRANULOCYTES NFR BLD AUTO: 0.3 % (ref 0–0.5)
KETONES UR QL STRIP: NEGATIVE
LEUKOCYTE ESTERASE UR QL STRIP.AUTO: NEGATIVE
LIPASE SERPL-CCNC: 35 U/L (ref 13–60)
LYMPHOCYTES # BLD AUTO: 0.59 10*3/MM3 (ref 0.7–3.1)
LYMPHOCYTES NFR BLD AUTO: 15.1 % (ref 19.6–45.3)
MCH RBC QN AUTO: 27.1 PG (ref 26.6–33)
MCHC RBC AUTO-ENTMCNC: 31.6 G/DL (ref 31.5–35.7)
MCV RBC AUTO: 85.6 FL (ref 79–97)
MONOCYTES # BLD AUTO: 0.51 10*3/MM3 (ref 0.1–0.9)
MONOCYTES NFR BLD AUTO: 13 % (ref 5–12)
NEUTROPHILS NFR BLD AUTO: 2.63 10*3/MM3 (ref 1.7–7)
NEUTROPHILS NFR BLD AUTO: 67 % (ref 42.7–76)
NITRITE UR QL STRIP: NEGATIVE
NRBC BLD AUTO-RTO: 0 /100 WBC (ref 0–0.2)
NT-PROBNP SERPL-MCNC: 100.2 PG/ML (ref 0–900)
PH UR STRIP.AUTO: 6.5 [PH] (ref 5–8)
PLATELET # BLD AUTO: 86 10*3/MM3 (ref 140–450)
PMV BLD AUTO: 11.8 FL (ref 6–12)
POTASSIUM SERPL-SCNC: 5.1 MMOL/L (ref 3.5–5.2)
PROT SERPL-MCNC: 6 G/DL (ref 6–8.5)
PROT UR QL STRIP: NEGATIVE
RBC # BLD AUTO: 2.92 10*6/MM3 (ref 4.14–5.8)
RSV RNA NPH QL NAA+NON-PROBE: NOT DETECTED
SARS-COV-2 RNA RESP QL NAA+PROBE: NOT DETECTED
SODIUM SERPL-SCNC: 135 MMOL/L (ref 136–145)
SP GR UR STRIP: 1.01 (ref 1–1.03)
UROBILINOGEN UR QL STRIP: NORMAL
WBC NRBC COR # BLD AUTO: 3.92 10*3/MM3 (ref 3.4–10.8)
WHOLE BLOOD HOLD COAG: NORMAL
WHOLE BLOOD HOLD SPECIMEN: NORMAL

## 2023-12-01 PROCEDURE — 71045 X-RAY EXAM CHEST 1 VIEW: CPT

## 2023-12-01 PROCEDURE — 25510000001 IOPAMIDOL PER 1 ML: Performed by: EMERGENCY MEDICINE

## 2023-12-01 PROCEDURE — 85025 COMPLETE CBC W/AUTO DIFF WBC: CPT | Performed by: EMERGENCY MEDICINE

## 2023-12-01 PROCEDURE — 87637 SARSCOV2&INF A&B&RSV AMP PRB: CPT | Performed by: PHYSICIAN ASSISTANT

## 2023-12-01 PROCEDURE — 25010000002 HYDROMORPHONE 1 MG/ML SOLUTION: Performed by: EMERGENCY MEDICINE

## 2023-12-01 PROCEDURE — 81003 URINALYSIS AUTO W/O SCOPE: CPT | Performed by: EMERGENCY MEDICINE

## 2023-12-01 PROCEDURE — 83690 ASSAY OF LIPASE: CPT

## 2023-12-01 PROCEDURE — 36415 COLL VENOUS BLD VENIPUNCTURE: CPT

## 2023-12-01 PROCEDURE — 96374 THER/PROPH/DIAG INJ IV PUSH: CPT

## 2023-12-01 PROCEDURE — 83880 ASSAY OF NATRIURETIC PEPTIDE: CPT | Performed by: PHYSICIAN ASSISTANT

## 2023-12-01 PROCEDURE — 74177 CT ABD & PELVIS W/CONTRAST: CPT

## 2023-12-01 PROCEDURE — 96375 TX/PRO/DX INJ NEW DRUG ADDON: CPT

## 2023-12-01 PROCEDURE — 80053 COMPREHEN METABOLIC PANEL: CPT

## 2023-12-01 PROCEDURE — 25010000002 FUROSEMIDE PER 20 MG: Performed by: PHYSICIAN ASSISTANT

## 2023-12-01 PROCEDURE — 83605 ASSAY OF LACTIC ACID: CPT

## 2023-12-01 PROCEDURE — 82140 ASSAY OF AMMONIA: CPT | Performed by: PHYSICIAN ASSISTANT

## 2023-12-01 PROCEDURE — 99285 EMERGENCY DEPT VISIT HI MDM: CPT

## 2023-12-01 RX ORDER — SODIUM CHLORIDE 0.9 % (FLUSH) 0.9 %
10 SYRINGE (ML) INJECTION AS NEEDED
Status: DISCONTINUED | OUTPATIENT
Start: 2023-12-01 | End: 2023-12-01 | Stop reason: HOSPADM

## 2023-12-01 RX ORDER — FUROSEMIDE 10 MG/ML
80 INJECTION INTRAMUSCULAR; INTRAVENOUS ONCE
Status: COMPLETED | OUTPATIENT
Start: 2023-12-01 | End: 2023-12-01

## 2023-12-01 RX ADMIN — HYDROMORPHONE HYDROCHLORIDE 1 MG: 1 INJECTION, SOLUTION INTRAMUSCULAR; INTRAVENOUS; SUBCUTANEOUS at 14:01

## 2023-12-01 RX ADMIN — FUROSEMIDE 80 MG: 10 INJECTION, SOLUTION INTRAMUSCULAR; INTRAVENOUS at 15:59

## 2023-12-01 RX ADMIN — IOPAMIDOL 100 ML: 755 INJECTION, SOLUTION INTRAVENOUS at 13:53

## 2023-12-01 NOTE — DISCHARGE INSTRUCTIONS
Your CT scan shows fluid overload in your tissues but not a lot of fluid buildup within your abdominal cavity.  There is no indication for a paracentesis at this time.  I have given you a large dose of lasix and would like for you to double your lasix at home for the next 3 days to remove this fluid and follow-up with your primary care provider. Your hemoglobin was low again today. It was not in the range for a blood transfusion but I would like for you to call and schedule a follow-up with Dr. Sanchez with hematology. I would make sure you are taking an iron supplement.
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
## 2023-12-03 ENCOUNTER — APPOINTMENT (OUTPATIENT)
Dept: CT IMAGING | Facility: HOSPITAL | Age: 58
End: 2023-12-03
Payer: MEDICAID

## 2023-12-03 ENCOUNTER — HOSPITAL ENCOUNTER (EMERGENCY)
Facility: HOSPITAL | Age: 58
Discharge: HOME | End: 2023-12-03
Attending: EMERGENCY MEDICINE | Admitting: EMERGENCY MEDICINE
Payer: MEDICAID

## 2023-12-03 VITALS
WEIGHT: 315 LBS | BODY MASS INDEX: 47.74 KG/M2 | TEMPERATURE: 97.7 F | SYSTOLIC BLOOD PRESSURE: 130 MMHG | HEART RATE: 83 BPM | OXYGEN SATURATION: 95 % | RESPIRATION RATE: 20 BRPM | HEIGHT: 68 IN | DIASTOLIC BLOOD PRESSURE: 67 MMHG

## 2023-12-03 DIAGNOSIS — S00.83XA TRAUMATIC HEMATOMA OF FOREHEAD, INITIAL ENCOUNTER: Primary | ICD-10-CM

## 2023-12-03 PROCEDURE — 70450 CT HEAD/BRAIN W/O DYE: CPT

## 2023-12-03 PROCEDURE — 99284 EMERGENCY DEPT VISIT MOD MDM: CPT

## 2023-12-03 PROCEDURE — 72125 CT NECK SPINE W/O DYE: CPT
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
## 2023-12-15 ENCOUNTER — OFFICE VISIT (OUTPATIENT)
Dept: SURGERY | Facility: CLINIC | Age: 58
End: 2023-12-15
Payer: MEDICAID

## 2023-12-15 VITALS — WEIGHT: 315 LBS | RESPIRATION RATE: 16 BRPM | HEIGHT: 68 IN | BODY MASS INDEX: 47.74 KG/M2

## 2023-12-15 DIAGNOSIS — K75.81 LIVER CIRRHOSIS SECONDARY TO NASH (NONALCOHOLIC STEATOHEPATITIS): Primary | Chronic | ICD-10-CM

## 2023-12-15 DIAGNOSIS — R60.1 ANASARCA: ICD-10-CM

## 2023-12-15 DIAGNOSIS — K43.9 EPIGASTRIC HERNIA: ICD-10-CM

## 2023-12-15 DIAGNOSIS — K74.60 LIVER CIRRHOSIS SECONDARY TO NASH (NONALCOHOLIC STEATOHEPATITIS): Primary | Chronic | ICD-10-CM

## 2023-12-15 DIAGNOSIS — K42.9 UMBILICAL HERNIA WITHOUT OBSTRUCTION AND WITHOUT GANGRENE: ICD-10-CM

## 2023-12-15 PROCEDURE — 1160F RVW MEDS BY RX/DR IN RCRD: CPT | Performed by: SURGERY

## 2023-12-15 PROCEDURE — 99202 OFFICE O/P NEW SF 15 MIN: CPT | Performed by: SURGERY

## 2023-12-15 PROCEDURE — 1159F MED LIST DOCD IN RCRD: CPT | Performed by: SURGERY

## 2023-12-15 NOTE — PROGRESS NOTES
Inpatient History and Physical Surgical Orders    Preadmission Location:   Preadmission Time:  Facility:  Surgery Date:  Surgery Time:  Preadmission Test date:     Chief Complaint  Outpatient History and Physical / Surgical Orders    Primary Care Provider: Sheldon Carcamo MD    Referring Provider: Preet Lou APRN Subjective      Patient Name: Nic Nicolas : 1965    HPI  The patient is a 58-year-old gentleman who is referred for evaluation of abdominal wall hernias.  He has known poorly compensated liver failure secondary to ROMO.  He has had documented elevated ammonia levels for several months.  He also has documented thrombocytopenia and hypoalbuminemia secondary to his liver failure.  He recently had a CT scan that revealed evidence of 2 abdominal wall hernias.  He had a hernia in the epigastrium and also has a hernia at the umbilicus and both of these hernias contain fat but no bowel.  He was also noted to have anasarca on that CT scan.    Past History:  Medical History: has a past medical history of Abdominal hernia, Allergic, Anxiety, Arthritis, Asthma, Cirrhosis, Colon polyps, Depression, Diabetes mellitus, Diabetes mellitus type I, DVT (deep venous thrombosis), GERD (gastroesophageal reflux disease), Head injury, Hypertension, Liver disease, Reflux esophagitis, Renal disorder, Sleep apnea, and TBI (traumatic brain injury).   Surgical History: has a past surgical history that includes Leg Surgery (Left); Pelvic fracture surgery; Colonoscopy (, ); Esophagogastroduodenoscopy (2019); Fracture surgery; Upper gastrointestinal endoscopy; Knee surgery (Left); and Esophagogastroduodenoscopy (N/A, 2023).   Family History: family history includes Diabetes in his mother and sister; Lung cancer in his father; Stomach cancer in his sister.   Social History: reports that he has never smoked. He has been exposed to tobacco smoke. He has never used smokeless tobacco. He reports that he  does not currently use alcohol. He reports that he does not use drugs.  Allergies: Patient has no known allergies.       Current Outpatient Medications:     albuterol sulfate  (90 Base) MCG/ACT inhaler, Inhale 2 puffs Every 4 (Four) Hours As Needed for Wheezing., Disp: 18 g, Rfl: 11    amoxicillin-clavulanate (Augmentin) 875-125 MG per tablet, Take 1 tablet by mouth Every 12 (Twelve) Hours., Disp: 14 tablet, Rfl: 0    atorvastatin (LIPITOR) 40 MG tablet, Take 1 tablet by mouth Daily., Disp: 90 tablet, Rfl: 1    busPIRone (BUSPAR) 7.5 MG tablet, Take 1 tablet by mouth 3 (Three) Times a Day., Disp: 90 tablet, Rfl: 5    cetirizine (zyrTEC) 10 MG tablet, Take 1 tablet by mouth Daily., Disp: 90 tablet, Rfl: 3    Diclofenac Sodium (VOLTAREN) 1 % gel gel, Apply 4 g topically to the appropriate area as directed 4 (Four) Times a Day As Needed., Disp: , Rfl:     Diclofenac Sodium (VOLTAREN) 1 % gel gel, Apply 4 g topically to the appropriate area as directed 4 (Four) Times a Day., Disp: , Rfl:     diphenhydrAMINE (BENADRYL) 25 mg capsule, Take 1 capsule by mouth Every 6 (Six) Hours As Needed for Itching., Disp: , Rfl:     ferrous sulfate (FerrouSul) 325 (65 FE) MG tablet, Take 1 tablet by mouth Daily With Breakfast., Disp: 90 tablet, Rfl: 1    fluticasone (Flonase) 50 MCG/ACT nasal spray, 2 sprays into the nostril(s) as directed by provider Daily., Disp: 18.2 mL, Rfl: 11    fluticasone (FLOVENT HFA) 110 MCG/ACT inhaler, Inhale 1 puff 2 (Two) Times a Day., Disp: 12 g, Rfl: 12    FREESTYLE LITE test strip, USE TO TEST BLOOD SUGAR FOUR TIMES A DAY, Disp: 100 each, Rfl: 3    furosemide (LASIX) 40 MG tablet, TAKE 1 TABLET BY MOUTH 2 (TWO) TIMES A DAY., Disp: 60 tablet, Rfl: 3    gabapentin (NEURONTIN) 100 MG capsule, Take 1 capsule by mouth 2 (Two) Times a Day., Disp: , Rfl:     HYDROcodone-acetaminophen (Norco) 7.5-325 MG per tablet, Take 1 tablet by mouth Every 6 (Six) Hours As Needed for Moderate Pain., Disp: 3 tablet,  Rfl: 0    insulin detemir (Levemir FlexPen) 100 UNIT/ML injection, Inject 10 Units under the skin into the appropriate area as directed Daily. (Patient taking differently: Inject 10 Units under the skin into the appropriate area as directed Daily. Taking 70 units am, 15 units at lunch and 70 units at pm), Disp: 105 mL, Rfl: 1    Insulin Lispro, 1 Unit Dial, (HUMALOG) 100 UNIT/ML solution pen-injector, Inject  under the skin into the appropriate area as directed 3 (Three) Times a Day As Needed., Disp: , Rfl:     ipratropium-albuterol (DUO-NEB) 0.5-2.5 mg/3 ml nebulizer, Take 3 mL by nebulization Every 4 (Four) Hours As Needed for Wheezing., Disp: , Rfl:     lactulose (Generlac) 10 GM/15ML solution solution (encephalopathy), Take 68 mL by mouth 2 (Two) Times a Day., Disp: 1892 mL, Rfl: 1    Lancets (freestyle) lancets, USE TO CHECK BLOOD SUGAR 5 TIMES PER DAY., Disp: 100 each, Rfl: 3    magnesium oxide (MAG-OX) 400 MG tablet, Take 1 tablet by mouth Daily., Disp: 90 tablet, Rfl: 1    magnesium oxide (MAG-OX) 400 MG tablet, Take 1 tablet by mouth Daily., Disp: 90 tablet, Rfl: 1    naloxone (NARCAN) 4 MG/0.1ML nasal spray, CALL 911. Do not prime. Spray into nostril upon signs of opioid overdose. May repeat in 2 to 3 minutes in opposite nostril if no or minimal breathing and responsiveness, then as needed (if doses are available) every 2 to 3 minutes., Disp: 2 each, Rfl: 0    omeprazole (priLOSEC) 40 MG capsule, Take 1 capsule by mouth 2 (Two) Times a Day., Disp: 180 capsule, Rfl: 3    ondansetron ODT (ZOFRAN-ODT) 4 MG disintegrating tablet, Place 1 tablet on the tongue Every 8 (Eight) Hours As Needed for Nausea or Vomiting., Disp: , Rfl:     ondansetron ODT (ZOFRAN-ODT) 8 MG disintegrating tablet, Place 1 tablet on the tongue Every 8 (Eight) Hours As Needed for Nausea or Vomiting., Disp: 30 tablet, Rfl: 2    oxyCODONE (ROXICODONE) 10 MG tablet, Take 1 tablet by mouth Every 4 (Four) Hours As Needed for Moderate Pain.,  "Disp: , Rfl:     oxyCODONE (ROXICODONE) 5 MG immediate release tablet, Take 1 tablet by mouth Every 6 (Six) Hours As Needed for Moderate Pain., Disp: 12 tablet, Rfl: 0    oxyCODONE-acetaminophen (PERCOCET) 5-325 MG per tablet, Take 1 tablet by mouth Every 6 (Six) Hours As Needed., Disp: , Rfl:     polyethyl glycol-propyl glycol (SYSTANE) 0.4-0.3 % solution ophthalmic solution (artificial tears), Administer 2 drops to both eyes Every 1 (One) Hour As Needed (prn in both eyes)., Disp: 30 mL, Rfl: 2    riFAXIMin (XIFAXAN) 550 MG tablet, Take 1 tablet by mouth Every 12 (Twelve) Hours. Indications: Impaired Brain Function due to Liver Disease, Disp: 180 tablet, Rfl: 3    rOPINIRole (REQUIP) 1 MG tablet, Take 1 tablet by mouth Every Night. take 1 hour before bedtime, Disp: 90 tablet, Rfl: 1    sertraline (ZOLOFT) 100 MG tablet, Take 1 tablet by mouth Every Night., Disp: 30 tablet, Rfl: 5    spironolactone (Aldactone) 100 MG tablet, Take 1 tablet by mouth 2 (Two) Times a Day., Disp: 60 tablet, Rfl: 3    TRANEXAMIC ACID PO, Take 650 mg by mouth 2 (Two) Times a Day., Disp: , Rfl:     traZODone (DESYREL) 50 MG tablet, Take 1 tablet by mouth Every Night., Disp: 30 tablet, Rfl: 5    vitamin D (ERGOCALCIFEROL) 1.25 MG (96775 UT) capsule capsule, Take 1 capsule by mouth Every 7 (Seven) Days., Disp: 12 capsule, Rfl: 1       Objective   Vital Signs:   Resp 16   Ht 172.7 cm (67.99\")   Wt (!) 148 kg (326 lb 4.5 oz)   BMI 49.62 kg/m²       Physical Exam  Vitals and nursing note reviewed.   Constitutional:       Appearance: He does not look well today.  He is very obese and is pretty short of breath just at rest.  He had a lot of trouble walking today with a walker and was clearly unsteady on his feet.  Cardiovascular:      Rate and Rhythm: Normal rate and regular rhythm.   Pulmonary:      Effort: Pulmonary effort is normal.      Breath sounds: Normal air entry.   Abdominal:      General: Bowel sounds are normal.      Palpations: " Abdomen is very obese.  He has palpable hernias at the umbilicus and in the epigastrium.     Skin:     General: Skin is warm and dry.   Neurological:      Mental Status: The patient is alert and can answer questions appropriately.     Motor: Motor function is intact.   Psychiatric:         Mood and Affect: Mood normal.       Result Review :               Assessment and Plan   Diagnoses and all orders for this visit:    1. 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 Epigastric hernia    3. Umbilical hernia without obstruction and without gangrene    4. Anasarca    Mr. Nicolas does have 2 ventral hernias that contain fat.  He is having a little bit of discomfort from them but at this point he really does not seem to be a good candidate for a general anesthetic for repair of these hernias.  I think given his weight and his low albumin level that any type of hernia repair would be much more likely to fail.  At this point I think the better course of action would be to just follow these hernias expectantly.  If he were to develop any type of incarceration of course we could reconsider at that time.    I  Neymar Caceres MD  12/15/2023

## 2023-12-21 ENCOUNTER — OFFICE VISIT (OUTPATIENT)
Dept: VASCULAR SURGERY | Facility: HOSPITAL | Age: 58
End: 2023-12-21
Payer: MEDICAID

## 2023-12-21 VITALS
DIASTOLIC BLOOD PRESSURE: 76 MMHG | OXYGEN SATURATION: 96 % | RESPIRATION RATE: 16 BRPM | SYSTOLIC BLOOD PRESSURE: 138 MMHG | TEMPERATURE: 97.8 F | HEART RATE: 106 BPM

## 2023-12-21 DIAGNOSIS — M79.604 LEG PAIN, BILATERAL: ICD-10-CM

## 2023-12-21 DIAGNOSIS — M79.89 LEG SWELLING: ICD-10-CM

## 2023-12-21 DIAGNOSIS — M79.605 LEG PAIN, BILATERAL: ICD-10-CM

## 2023-12-21 DIAGNOSIS — I73.9 CLAUDICATION: Primary | ICD-10-CM
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
## 2024-01-19 ENCOUNTER — TELEPHONE (OUTPATIENT)
Dept: GASTROENTEROLOGY | Facility: CLINIC | Age: 59
End: 2024-01-19
Payer: MEDICAID

## 2024-01-19 NOTE — TELEPHONE ENCOUNTER
Attempted to call pt.  Phone just rang.    Pt did not keep his last ultrasound appt.  Pt needs to be rescheduled. tee

## 2024-01-31 ENCOUNTER — HOSPITAL ENCOUNTER (INPATIENT)
Facility: HOSPITAL | Age: 59
LOS: 2 days | Discharge: NURSING, REHAB OR LONG-TERM CARE | DRG: 378 | End: 2024-02-02
Attending: EMERGENCY MEDICINE | Admitting: FAMILY MEDICINE
Payer: MEDICAID

## 2024-01-31 DIAGNOSIS — G89.29 CHRONIC LOW BACK PAIN, UNSPECIFIED BACK PAIN LATERALITY, UNSPECIFIED WHETHER SCIATICA PRESENT: Chronic | ICD-10-CM

## 2024-01-31 DIAGNOSIS — M54.50 CHRONIC LOW BACK PAIN, UNSPECIFIED BACK PAIN LATERALITY, UNSPECIFIED WHETHER SCIATICA PRESENT: Chronic | ICD-10-CM

## 2024-01-31 DIAGNOSIS — D62 ACUTE BLOOD LOSS ANEMIA: Primary | ICD-10-CM

## 2024-01-31 DIAGNOSIS — K92.2 GASTROINTESTINAL HEMORRHAGE, UNSPECIFIED GASTROINTESTINAL HEMORRHAGE TYPE: ICD-10-CM

## 2024-01-31 LAB
ABO GROUP BLD: NORMAL
ALBUMIN SERPL-MCNC: 3.1 G/DL (ref 3.5–5.2)
ALBUMIN/GLOB SERPL: 1 G/DL
ALP SERPL-CCNC: 106 U/L (ref 39–117)
ALT SERPL W P-5'-P-CCNC: 21 U/L (ref 1–41)
ANION GAP SERPL CALCULATED.3IONS-SCNC: 11.4 MMOL/L (ref 5–15)
ANISOCYTOSIS BLD QL: NORMAL
AST SERPL-CCNC: 36 U/L (ref 1–40)
BASOPHILS # BLD AUTO: 0.03 10*3/MM3 (ref 0–0.2)
BASOPHILS NFR BLD AUTO: 0.7 % (ref 0–1.5)
BILIRUB SERPL-MCNC: 1 MG/DL (ref 0–1.2)
BLD GP AB SCN SERPL QL: NEGATIVE
BUN SERPL-MCNC: 24 MG/DL (ref 6–20)
BUN/CREAT SERPL: 23.8 (ref 7–25)
CALCIUM SPEC-SCNC: 8.6 MG/DL (ref 8.6–10.5)
CHLORIDE SERPL-SCNC: 102 MMOL/L (ref 98–107)
CO2 SERPL-SCNC: 20.6 MMOL/L (ref 22–29)
CREAT SERPL-MCNC: 1.01 MG/DL (ref 0.76–1.27)
DACRYOCYTES BLD QL SMEAR: NORMAL
DEPRECATED RDW RBC AUTO: 48 FL (ref 37–54)
EGFRCR SERPLBLD CKD-EPI 2021: 86.7 ML/MIN/1.73
EOSINOPHIL # BLD AUTO: 0.08 10*3/MM3 (ref 0–0.4)
EOSINOPHIL NFR BLD AUTO: 1.8 % (ref 0.3–6.2)
ERYTHROCYTE [DISTWIDTH] IN BLOOD BY AUTOMATED COUNT: 18.3 % (ref 12.3–15.4)
GLOBULIN UR ELPH-MCNC: 3.1 GM/DL
GLUCOSE SERPL-MCNC: 217 MG/DL (ref 65–99)
HBA1C MFR BLD: 7.1 % (ref 4.8–5.6)
HCT VFR BLD AUTO: 19.6 % (ref 37.5–51)
HCT VFR BLD AUTO: 19.6 % (ref 37.5–51)
HEMOCCULT STL QL IA: POSITIVE
HGB BLD-MCNC: 5.6 G/DL (ref 13–17.7)
HGB BLD-MCNC: 5.7 G/DL (ref 13–17.7)
HOLD SPECIMEN: NORMAL
HOLD SPECIMEN: NORMAL
HYPOCHROMIA BLD QL: NORMAL
IMM GRANULOCYTES # BLD AUTO: 0.03 10*3/MM3 (ref 0–0.05)
IMM GRANULOCYTES NFR BLD AUTO: 0.7 % (ref 0–0.5)
INR PPP: 1.79 (ref 0.86–1.15)
IRON 24H UR-MRATE: 12 MCG/DL (ref 59–158)
IRON SATN MFR SERPL: 3 % (ref 20–50)
LARGE PLATELETS: NORMAL
LYMPHOCYTES # BLD AUTO: 0.63 10*3/MM3 (ref 0.7–3.1)
LYMPHOCYTES NFR BLD AUTO: 14.1 % (ref 19.6–45.3)
MCH RBC QN AUTO: 20.8 PG (ref 26.6–33)
MCHC RBC AUTO-ENTMCNC: 28.6 G/DL (ref 31.5–35.7)
MCV RBC AUTO: 72.9 FL (ref 79–97)
MICROCYTES BLD QL: NORMAL
MONOCYTES # BLD AUTO: 0.6 10*3/MM3 (ref 0.1–0.9)
MONOCYTES NFR BLD AUTO: 13.4 % (ref 5–12)
NEUTROPHILS NFR BLD AUTO: 3.1 10*3/MM3 (ref 1.7–7)
NEUTROPHILS NFR BLD AUTO: 69.3 % (ref 42.7–76)
NRBC BLD AUTO-RTO: 0 /100 WBC (ref 0–0.2)
OVALOCYTES BLD QL SMEAR: NORMAL
PLATELET # BLD AUTO: 81 10*3/MM3 (ref 140–450)
PMV BLD AUTO: ABNORMAL FL
POIKILOCYTOSIS BLD QL SMEAR: NORMAL
POTASSIUM SERPL-SCNC: 4.9 MMOL/L (ref 3.5–5.2)
PROT SERPL-MCNC: 6.2 G/DL (ref 6–8.5)
PROTHROMBIN TIME: 21.1 SECONDS (ref 11.8–14.9)
RBC # BLD AUTO: 2.69 10*6/MM3 (ref 4.14–5.8)
RH BLD: POSITIVE
SMALL PLATELETS BLD QL SMEAR: NORMAL
SODIUM SERPL-SCNC: 134 MMOL/L (ref 136–145)
T&S EXPIRATION DATE: NORMAL
TIBC SERPL-MCNC: 451 MCG/DL (ref 298–536)
TRANSFERRIN SERPL-MCNC: 303 MG/DL (ref 200–360)
WBC MORPH BLD: NORMAL
WBC NRBC COR # BLD AUTO: 4.47 10*3/MM3 (ref 3.4–10.8)
WHOLE BLOOD HOLD COAG: NORMAL
WHOLE BLOOD HOLD SPECIMEN: NORMAL

## 2024-01-31 PROCEDURE — 86900 BLOOD TYPING SEROLOGIC ABO: CPT

## 2024-01-31 PROCEDURE — 85007 BL SMEAR W/DIFF WBC COUNT: CPT | Performed by: EMERGENCY MEDICINE

## 2024-01-31 PROCEDURE — 86900 BLOOD TYPING SEROLOGIC ABO: CPT | Performed by: EMERGENCY MEDICINE

## 2024-01-31 PROCEDURE — 99291 CRITICAL CARE FIRST HOUR: CPT

## 2024-01-31 PROCEDURE — 86850 RBC ANTIBODY SCREEN: CPT | Performed by: EMERGENCY MEDICINE

## 2024-01-31 PROCEDURE — 99284 EMERGENCY DEPT VISIT MOD MDM: CPT | Performed by: INTERNAL MEDICINE

## 2024-01-31 PROCEDURE — P9016 RBC LEUKOCYTES REDUCED: HCPCS

## 2024-01-31 PROCEDURE — 25010000002 ONDANSETRON PER 1 MG: Performed by: EMERGENCY MEDICINE

## 2024-01-31 PROCEDURE — 36415 COLL VENOUS BLD VENIPUNCTURE: CPT | Performed by: FAMILY MEDICINE

## 2024-01-31 PROCEDURE — 99223 1ST HOSP IP/OBS HIGH 75: CPT | Performed by: FAMILY MEDICINE

## 2024-01-31 PROCEDURE — 85610 PROTHROMBIN TIME: CPT | Performed by: FAMILY MEDICINE

## 2024-01-31 PROCEDURE — 25010000002 ONDANSETRON PER 1 MG: Performed by: FAMILY MEDICINE

## 2024-01-31 PROCEDURE — 86901 BLOOD TYPING SEROLOGIC RH(D): CPT | Performed by: EMERGENCY MEDICINE

## 2024-01-31 PROCEDURE — 86923 COMPATIBILITY TEST ELECTRIC: CPT

## 2024-01-31 PROCEDURE — 80053 COMPREHEN METABOLIC PANEL: CPT | Performed by: EMERGENCY MEDICINE

## 2024-01-31 PROCEDURE — 85025 COMPLETE CBC W/AUTO DIFF WBC: CPT | Performed by: EMERGENCY MEDICINE

## 2024-01-31 PROCEDURE — 83036 HEMOGLOBIN GLYCOSYLATED A1C: CPT | Performed by: FAMILY MEDICINE

## 2024-01-31 PROCEDURE — 85018 HEMOGLOBIN: CPT | Performed by: FAMILY MEDICINE

## 2024-01-31 PROCEDURE — 82948 REAGENT STRIP/BLOOD GLUCOSE: CPT

## 2024-01-31 PROCEDURE — 83540 ASSAY OF IRON: CPT | Performed by: EMERGENCY MEDICINE

## 2024-01-31 PROCEDURE — 36430 TRANSFUSION BLD/BLD COMPNT: CPT

## 2024-01-31 PROCEDURE — 84466 ASSAY OF TRANSFERRIN: CPT | Performed by: EMERGENCY MEDICINE

## 2024-01-31 PROCEDURE — 85014 HEMATOCRIT: CPT | Performed by: FAMILY MEDICINE

## 2024-01-31 PROCEDURE — 82274 ASSAY TEST FOR BLOOD FECAL: CPT | Performed by: EMERGENCY MEDICINE

## 2024-01-31 PROCEDURE — 25010000002 HYDROMORPHONE 1 MG/ML SOLUTION: Performed by: EMERGENCY MEDICINE

## 2024-01-31 RX ORDER — ROPINIROLE 1 MG/1
1 TABLET, FILM COATED ORAL NIGHTLY
Status: DISCONTINUED | OUTPATIENT
Start: 2024-01-31 | End: 2024-02-02 | Stop reason: HOSPADM

## 2024-01-31 RX ORDER — NITROGLYCERIN 0.4 MG/1
0.4 TABLET SUBLINGUAL
Status: DISCONTINUED | OUTPATIENT
Start: 2024-01-31 | End: 2024-02-02 | Stop reason: HOSPADM

## 2024-01-31 RX ORDER — NICOTINE POLACRILEX 4 MG
15 LOZENGE BUCCAL
Status: DISCONTINUED | OUTPATIENT
Start: 2024-01-31 | End: 2024-02-02 | Stop reason: HOSPADM

## 2024-01-31 RX ORDER — ALBUTEROL SULFATE 2.5 MG/3ML
2.5 SOLUTION RESPIRATORY (INHALATION)
Status: DISCONTINUED | OUTPATIENT
Start: 2024-01-31 | End: 2024-02-02 | Stop reason: HOSPADM

## 2024-01-31 RX ORDER — SODIUM CHLORIDE 0.9 % (FLUSH) 0.9 %
10 SYRINGE (ML) INJECTION EVERY 12 HOURS SCHEDULED
Status: DISCONTINUED | OUTPATIENT
Start: 2024-01-31 | End: 2024-02-02 | Stop reason: HOSPADM

## 2024-01-31 RX ORDER — SPIRONOLACTONE 25 MG/1
100 TABLET ORAL
Status: DISCONTINUED | OUTPATIENT
Start: 2024-01-31 | End: 2024-02-02 | Stop reason: HOSPADM

## 2024-01-31 RX ORDER — LACTULOSE 10 G/15ML
30 SOLUTION ORAL 2 TIMES DAILY
Status: DISCONTINUED | OUTPATIENT
Start: 2024-01-31 | End: 2024-02-02 | Stop reason: HOSPADM

## 2024-01-31 RX ORDER — ONDANSETRON 4 MG/1
4 TABLET, ORALLY DISINTEGRATING ORAL EVERY 6 HOURS PRN
Status: DISCONTINUED | OUTPATIENT
Start: 2024-01-31 | End: 2024-02-02 | Stop reason: HOSPADM

## 2024-01-31 RX ORDER — ONDANSETRON 2 MG/ML
4 INJECTION INTRAMUSCULAR; INTRAVENOUS EVERY 6 HOURS PRN
Status: DISCONTINUED | OUTPATIENT
Start: 2024-01-31 | End: 2024-02-02 | Stop reason: HOSPADM

## 2024-01-31 RX ORDER — IBUPROFEN 600 MG/1
1 TABLET ORAL
Status: DISCONTINUED | OUTPATIENT
Start: 2024-01-31 | End: 2024-02-02 | Stop reason: HOSPADM

## 2024-01-31 RX ORDER — DEXTROSE MONOHYDRATE 25 G/50ML
25 INJECTION, SOLUTION INTRAVENOUS
Status: DISCONTINUED | OUTPATIENT
Start: 2024-01-31 | End: 2024-02-02 | Stop reason: HOSPADM

## 2024-01-31 RX ORDER — SODIUM CHLORIDE 0.9 % (FLUSH) 0.9 %
10 SYRINGE (ML) INJECTION AS NEEDED
Status: DISCONTINUED | OUTPATIENT
Start: 2024-01-31 | End: 2024-02-02 | Stop reason: HOSPADM

## 2024-01-31 RX ORDER — IBUPROFEN 200 MG
1 TABLET ORAL TAKE AS DIRECTED
COMMUNITY

## 2024-01-31 RX ORDER — BUSPIRONE HYDROCHLORIDE 7.5 MG/1
7.5 TABLET ORAL DAILY
Status: DISCONTINUED | OUTPATIENT
Start: 2024-02-01 | End: 2024-02-02 | Stop reason: HOSPADM

## 2024-01-31 RX ORDER — GUAIFENESIN AND DEXTROMETHORPHAN HYDROBROMIDE 100; 10 MG/5ML; MG/5ML
10 SOLUTION ORAL EVERY 6 HOURS PRN
COMMUNITY

## 2024-01-31 RX ORDER — GABAPENTIN 100 MG/1
100 CAPSULE ORAL EVERY 12 HOURS SCHEDULED
Status: DISCONTINUED | OUTPATIENT
Start: 2024-01-31 | End: 2024-02-02 | Stop reason: HOSPADM

## 2024-01-31 RX ORDER — SERTRALINE HYDROCHLORIDE 100 MG/1
100 TABLET, FILM COATED ORAL NIGHTLY
Status: DISCONTINUED | OUTPATIENT
Start: 2024-01-31 | End: 2024-02-02 | Stop reason: HOSPADM

## 2024-01-31 RX ORDER — OCTREOTIDE ACETATE 50 UG/ML
50 INJECTION, SOLUTION INTRAVENOUS; SUBCUTANEOUS ONCE
Status: DISCONTINUED | OUTPATIENT
Start: 2024-01-31 | End: 2024-02-01

## 2024-01-31 RX ORDER — OXYCODONE HYDROCHLORIDE 5 MG/1
10 TABLET ORAL EVERY 4 HOURS PRN
Status: DISCONTINUED | OUTPATIENT
Start: 2024-01-31 | End: 2024-02-02 | Stop reason: HOSPADM

## 2024-01-31 RX ORDER — ONDANSETRON 2 MG/ML
4 INJECTION INTRAMUSCULAR; INTRAVENOUS ONCE
Status: COMPLETED | OUTPATIENT
Start: 2024-01-31 | End: 2024-01-31

## 2024-01-31 RX ORDER — FUROSEMIDE 40 MG/1
40 TABLET ORAL DAILY
Status: DISCONTINUED | OUTPATIENT
Start: 2024-02-01 | End: 2024-02-02 | Stop reason: HOSPADM

## 2024-01-31 RX ORDER — INSULIN LISPRO 100 [IU]/ML
15 INJECTION, SOLUTION INTRAVENOUS; SUBCUTANEOUS
COMMUNITY
Start: 2024-01-31 | End: 2024-01-31

## 2024-01-31 RX ORDER — OXYCODONE HYDROCHLORIDE 5 MG/1
5 TABLET ORAL EVERY 4 HOURS PRN
Status: DISCONTINUED | OUTPATIENT
Start: 2024-01-31 | End: 2024-02-02 | Stop reason: HOSPADM

## 2024-01-31 RX ORDER — NYSTATIN 100000 [USP'U]/G
1 POWDER TOPICAL 2 TIMES DAILY
COMMUNITY

## 2024-01-31 RX ORDER — FUROSEMIDE 40 MG/1
40 TABLET ORAL
Status: DISCONTINUED | OUTPATIENT
Start: 2024-01-31 | End: 2024-01-31

## 2024-01-31 RX ORDER — INSULIN LISPRO 100 [IU]/ML
15 INJECTION, SOLUTION INTRAVENOUS; SUBCUTANEOUS
COMMUNITY

## 2024-01-31 RX ORDER — SODIUM CHLORIDE 9 MG/ML
40 INJECTION, SOLUTION INTRAVENOUS AS NEEDED
Status: DISCONTINUED | OUTPATIENT
Start: 2024-01-31 | End: 2024-02-02 | Stop reason: HOSPADM

## 2024-01-31 RX ADMIN — OXYCODONE 5 MG: 5 TABLET ORAL at 20:58

## 2024-01-31 RX ADMIN — ONDANSETRON 4 MG: 2 INJECTION INTRAMUSCULAR; INTRAVENOUS at 15:58

## 2024-01-31 RX ADMIN — SPIRONOLACTONE 100 MG: 25 TABLET ORAL at 20:58

## 2024-01-31 RX ADMIN — ONDANSETRON HYDROCHLORIDE 4 MG: 2 SOLUTION INTRAMUSCULAR; INTRAVENOUS at 20:58

## 2024-01-31 RX ADMIN — ROPINIROLE HYDROCHLORIDE 1 MG: 1 TABLET, FILM COATED ORAL at 21:00

## 2024-01-31 RX ADMIN — LACTULOSE 30 G: 20 SOLUTION ORAL at 20:58

## 2024-01-31 RX ADMIN — SERTRALINE HYDROCHLORIDE 100 MG: 100 TABLET ORAL at 21:04

## 2024-01-31 RX ADMIN — RIFAXIMIN 550 MG: 550 TABLET ORAL at 21:03

## 2024-01-31 RX ADMIN — GABAPENTIN 100 MG: 100 CAPSULE ORAL at 20:58

## 2024-01-31 RX ADMIN — HYDROMORPHONE HYDROCHLORIDE 1 MG: 1 INJECTION, SOLUTION INTRAMUSCULAR; INTRAVENOUS; SUBCUTANEOUS at 15:59

## 2024-01-31 RX ADMIN — Medication 10 ML: at 21:02
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
## 2024-02-01 ENCOUNTER — ANESTHESIA EVENT (OUTPATIENT)
Dept: GASTROENTEROLOGY | Facility: HOSPITAL | Age: 59
End: 2024-02-01
Payer: MEDICAID

## 2024-02-01 ENCOUNTER — ANESTHESIA (OUTPATIENT)
Dept: GASTROENTEROLOGY | Facility: HOSPITAL | Age: 59
End: 2024-02-01
Payer: MEDICAID

## 2024-02-01 LAB
AMMONIA BLD-SCNC: 60 UMOL/L (ref 16–60)
ANION GAP SERPL CALCULATED.3IONS-SCNC: 7.1 MMOL/L (ref 5–15)
BASOPHILS # BLD AUTO: 0.03 10*3/MM3 (ref 0–0.2)
BASOPHILS NFR BLD AUTO: 0.8 % (ref 0–1.5)
BUN SERPL-MCNC: 23 MG/DL (ref 6–20)
BUN/CREAT SERPL: 23.5 (ref 7–25)
CALCIUM SPEC-SCNC: 7.9 MG/DL (ref 8.6–10.5)
CHLORIDE SERPL-SCNC: 107 MMOL/L (ref 98–107)
CO2 SERPL-SCNC: 21.9 MMOL/L (ref 22–29)
CREAT SERPL-MCNC: 0.98 MG/DL (ref 0.76–1.27)
DEPRECATED RDW RBC AUTO: 52.4 FL (ref 37–54)
EGFRCR SERPLBLD CKD-EPI 2021: 89.9 ML/MIN/1.73
EOSINOPHIL # BLD AUTO: 0.12 10*3/MM3 (ref 0–0.4)
EOSINOPHIL NFR BLD AUTO: 3.3 % (ref 0.3–6.2)
ERYTHROCYTE [DISTWIDTH] IN BLOOD BY AUTOMATED COUNT: 18.9 % (ref 12.3–15.4)
GLUCOSE BLDC GLUCOMTR-MCNC: 131 MG/DL (ref 70–99)
GLUCOSE BLDC GLUCOMTR-MCNC: 150 MG/DL (ref 70–99)
GLUCOSE BLDC GLUCOMTR-MCNC: 172 MG/DL (ref 70–99)
GLUCOSE BLDC GLUCOMTR-MCNC: 207 MG/DL (ref 70–99)
GLUCOSE BLDC GLUCOMTR-MCNC: 216 MG/DL (ref 70–99)
GLUCOSE BLDC GLUCOMTR-MCNC: 216 MG/DL (ref 70–99)
GLUCOSE SERPL-MCNC: 163 MG/DL (ref 65–99)
HCT VFR BLD AUTO: 21 % (ref 37.5–51)
HCT VFR BLD AUTO: 22.8 % (ref 37.5–51)
HCT VFR BLD AUTO: 23.5 % (ref 37.5–51)
HCT VFR BLD AUTO: 26.4 % (ref 37.5–51)
HGB BLD-MCNC: 6.2 G/DL (ref 13–17.7)
HGB BLD-MCNC: 6.8 G/DL (ref 13–17.7)
HGB BLD-MCNC: 6.9 G/DL (ref 13–17.7)
HGB BLD-MCNC: 7.9 G/DL (ref 13–17.7)
IMM GRANULOCYTES # BLD AUTO: 0.02 10*3/MM3 (ref 0–0.05)
IMM GRANULOCYTES NFR BLD AUTO: 0.6 % (ref 0–0.5)
LYMPHOCYTES # BLD AUTO: 0.59 10*3/MM3 (ref 0.7–3.1)
LYMPHOCYTES NFR BLD AUTO: 16.3 % (ref 19.6–45.3)
MCH RBC QN AUTO: 22.7 PG (ref 26.6–33)
MCHC RBC AUTO-ENTMCNC: 29.8 G/DL (ref 31.5–35.7)
MCV RBC AUTO: 76 FL (ref 79–97)
MONOCYTES # BLD AUTO: 0.47 10*3/MM3 (ref 0.1–0.9)
MONOCYTES NFR BLD AUTO: 13 % (ref 5–12)
NEUTROPHILS NFR BLD AUTO: 2.38 10*3/MM3 (ref 1.7–7)
NEUTROPHILS NFR BLD AUTO: 66 % (ref 42.7–76)
NRBC BLD AUTO-RTO: 0 /100 WBC (ref 0–0.2)
PLATELET # BLD AUTO: 64 10*3/MM3 (ref 140–450)
PMV BLD AUTO: ABNORMAL FL
POTASSIUM SERPL-SCNC: 5.3 MMOL/L (ref 3.5–5.2)
RBC # BLD AUTO: 3 10*6/MM3 (ref 4.14–5.8)
SODIUM SERPL-SCNC: 136 MMOL/L (ref 136–145)
WBC NRBC COR # BLD AUTO: 3.61 10*3/MM3 (ref 3.4–10.8)

## 2024-02-01 PROCEDURE — 25010000002 GLYCOPYRROLATE 0.2 MG/ML SOLUTION: Performed by: MARRIAGE & FAMILY THERAPIST

## 2024-02-01 PROCEDURE — 25010000002 PROPOFOL 10 MG/ML EMULSION: Performed by: MARRIAGE & FAMILY THERAPIST

## 2024-02-01 PROCEDURE — P9016 RBC LEUKOCYTES REDUCED: HCPCS

## 2024-02-01 PROCEDURE — 80048 BASIC METABOLIC PNL TOTAL CA: CPT | Performed by: FAMILY MEDICINE

## 2024-02-01 PROCEDURE — 25010000002 OCTREOTIDE PER 25 MCG: Performed by: INTERNAL MEDICINE

## 2024-02-01 PROCEDURE — 85014 HEMATOCRIT: CPT | Performed by: INTERNAL MEDICINE

## 2024-02-01 PROCEDURE — 85025 COMPLETE CBC W/AUTO DIFF WBC: CPT | Performed by: FAMILY MEDICINE

## 2024-02-01 PROCEDURE — 82948 REAGENT STRIP/BLOOD GLUCOSE: CPT

## 2024-02-01 PROCEDURE — 86900 BLOOD TYPING SEROLOGIC ABO: CPT

## 2024-02-01 PROCEDURE — 82140 ASSAY OF AMMONIA: CPT | Performed by: INTERNAL MEDICINE

## 2024-02-01 PROCEDURE — 25010000002 CEFTRIAXONE PER 250 MG: Performed by: FAMILY MEDICINE

## 2024-02-01 PROCEDURE — 85018 HEMOGLOBIN: CPT | Performed by: FAMILY MEDICINE

## 2024-02-01 PROCEDURE — 86923 COMPATIBILITY TEST ELECTRIC: CPT

## 2024-02-01 PROCEDURE — 25010000002 FUROSEMIDE PER 20 MG: Performed by: INTERNAL MEDICINE

## 2024-02-01 PROCEDURE — 25010000002 OCTREOTIDE PER 25 MCG: Performed by: FAMILY MEDICINE

## 2024-02-01 PROCEDURE — 25010000002 FENTANYL CITRATE (PF) 50 MCG/ML SOLUTION: Performed by: MARRIAGE & FAMILY THERAPIST

## 2024-02-01 PROCEDURE — 85014 HEMATOCRIT: CPT | Performed by: FAMILY MEDICINE

## 2024-02-01 PROCEDURE — 36415 COLL VENOUS BLD VENIPUNCTURE: CPT | Performed by: INTERNAL MEDICINE

## 2024-02-01 PROCEDURE — 99233 SBSQ HOSP IP/OBS HIGH 50: CPT | Performed by: FAMILY MEDICINE

## 2024-02-01 PROCEDURE — 63710000001 INSULIN REGULAR HUMAN PER 5 UNITS: Performed by: INTERNAL MEDICINE

## 2024-02-01 PROCEDURE — 85025 COMPLETE CBC W/AUTO DIFF WBC: CPT | Performed by: INTERNAL MEDICINE

## 2024-02-01 PROCEDURE — 36430 TRANSFUSION BLD/BLD COMPNT: CPT

## 2024-02-01 PROCEDURE — 0DJ08ZZ INSPECTION OF UPPER INTESTINAL TRACT, VIA NATURAL OR ARTIFICIAL OPENING ENDOSCOPIC: ICD-10-PCS | Performed by: INTERNAL MEDICINE

## 2024-02-01 PROCEDURE — 63710000001 INSULIN LISPRO (HUMAN) PER 5 UNITS: Performed by: FAMILY MEDICINE

## 2024-02-01 PROCEDURE — 25810000003 LACTATED RINGERS PER 1000 ML: Performed by: MARRIAGE & FAMILY THERAPIST

## 2024-02-01 PROCEDURE — 63710000001 INSULIN REGULAR HUMAN PER 5 UNITS: Performed by: FAMILY MEDICINE

## 2024-02-01 PROCEDURE — 43255 EGD CONTROL BLEEDING ANY: CPT | Performed by: INTERNAL MEDICINE

## 2024-02-01 PROCEDURE — 82948 REAGENT STRIP/BLOOD GLUCOSE: CPT | Performed by: FAMILY MEDICINE

## 2024-02-01 PROCEDURE — 85018 HEMOGLOBIN: CPT | Performed by: INTERNAL MEDICINE

## 2024-02-01 PROCEDURE — 82948 REAGENT STRIP/BLOOD GLUCOSE: CPT | Performed by: INTERNAL MEDICINE

## 2024-02-01 RX ORDER — SODIUM CHLORIDE 9 MG/ML
50 INJECTION, SOLUTION INTRAVENOUS CONTINUOUS
Status: DISCONTINUED | OUTPATIENT
Start: 2024-02-01 | End: 2024-02-01

## 2024-02-01 RX ORDER — FUROSEMIDE 10 MG/ML
40 INJECTION INTRAMUSCULAR; INTRAVENOUS ONCE
Status: COMPLETED | OUTPATIENT
Start: 2024-02-01 | End: 2024-02-01

## 2024-02-01 RX ORDER — GLYCOPYRROLATE 0.2 MG/ML
INJECTION INTRAMUSCULAR; INTRAVENOUS AS NEEDED
Status: DISCONTINUED | OUTPATIENT
Start: 2024-02-01 | End: 2024-02-01 | Stop reason: SURG

## 2024-02-01 RX ORDER — SODIUM CHLORIDE, SODIUM LACTATE, POTASSIUM CHLORIDE, CALCIUM CHLORIDE 600; 310; 30; 20 MG/100ML; MG/100ML; MG/100ML; MG/100ML
INJECTION, SOLUTION INTRAVENOUS CONTINUOUS PRN
Status: DISCONTINUED | OUTPATIENT
Start: 2024-02-01 | End: 2024-02-01 | Stop reason: SURG

## 2024-02-01 RX ORDER — PROPOFOL 10 MG/ML
VIAL (ML) INTRAVENOUS AS NEEDED
Status: DISCONTINUED | OUTPATIENT
Start: 2024-02-01 | End: 2024-02-01

## 2024-02-01 RX ORDER — SUCRALFATE 1 G/1
1 TABLET ORAL
Status: DISCONTINUED | OUTPATIENT
Start: 2024-02-01 | End: 2024-02-02 | Stop reason: HOSPADM

## 2024-02-01 RX ORDER — PANTOPRAZOLE SODIUM 40 MG/1
40 TABLET, DELAYED RELEASE ORAL 2 TIMES DAILY
Status: DISCONTINUED | OUTPATIENT
Start: 2024-02-02 | End: 2024-02-02 | Stop reason: HOSPADM

## 2024-02-01 RX ORDER — LIDOCAINE HYDROCHLORIDE 20 MG/ML
INJECTION, SOLUTION EPIDURAL; INFILTRATION; INTRACAUDAL; PERINEURAL AS NEEDED
Status: DISCONTINUED | OUTPATIENT
Start: 2024-02-01 | End: 2024-02-01 | Stop reason: SURG

## 2024-02-01 RX ORDER — INSULIN LISPRO 100 [IU]/ML
2-7 INJECTION, SOLUTION INTRAVENOUS; SUBCUTANEOUS
Status: DISCONTINUED | OUTPATIENT
Start: 2024-02-01 | End: 2024-02-02 | Stop reason: HOSPADM

## 2024-02-01 RX ORDER — PROPOFOL 10 MG/ML
VIAL (ML) INTRAVENOUS AS NEEDED
Status: DISCONTINUED | OUTPATIENT
Start: 2024-02-01 | End: 2024-02-01 | Stop reason: SURG

## 2024-02-01 RX ORDER — FENTANYL CITRATE 50 UG/ML
INJECTION, SOLUTION INTRAMUSCULAR; INTRAVENOUS AS NEEDED
Status: DISCONTINUED | OUTPATIENT
Start: 2024-02-01 | End: 2024-02-01 | Stop reason: SURG

## 2024-02-01 RX ADMIN — SODIUM CHLORIDE, POTASSIUM CHLORIDE, SODIUM LACTATE AND CALCIUM CHLORIDE: 600; 310; 30; 20 INJECTION, SOLUTION INTRAVENOUS at 15:34

## 2024-02-01 RX ADMIN — INSULIN HUMAN 2 UNITS: 100 INJECTION, SOLUTION PARENTERAL at 18:29

## 2024-02-01 RX ADMIN — INSULIN LISPRO 3 UNITS: 100 INJECTION, SOLUTION INTRAVENOUS; SUBCUTANEOUS at 21:23

## 2024-02-01 RX ADMIN — FUROSEMIDE 40 MG: 40 TABLET ORAL at 09:29

## 2024-02-01 RX ADMIN — OXYCODONE 10 MG: 5 TABLET ORAL at 21:23

## 2024-02-01 RX ADMIN — SUCRALFATE 1 G: 1 TABLET ORAL at 20:24

## 2024-02-01 RX ADMIN — BUSPIRONE HYDROCHLORIDE 7.5 MG: 7.5 TABLET ORAL at 09:29

## 2024-02-01 RX ADMIN — SODIUM CHLORIDE 8 MG/HR: 900 INJECTION INTRAVENOUS at 09:34

## 2024-02-01 RX ADMIN — OCTREOTIDE ACETATE 50 MCG/HR: 500 INJECTION, SOLUTION INTRAVENOUS; SUBCUTANEOUS at 09:34

## 2024-02-01 RX ADMIN — Medication 10 ML: at 08:11

## 2024-02-01 RX ADMIN — INSULIN HUMAN 3 UNITS: 100 INJECTION, SOLUTION PARENTERAL at 00:26

## 2024-02-01 RX ADMIN — PROPOFOL 115 MCG/KG/MIN: 10 INJECTION, EMULSION INTRAVENOUS at 15:36

## 2024-02-01 RX ADMIN — SPIRONOLACTONE 100 MG: 25 TABLET ORAL at 09:29

## 2024-02-01 RX ADMIN — OXYCODONE 10 MG: 5 TABLET ORAL at 17:13

## 2024-02-01 RX ADMIN — RIFAXIMIN 550 MG: 550 TABLET ORAL at 20:24

## 2024-02-01 RX ADMIN — SUCRALFATE 1 G: 1 TABLET ORAL at 18:15

## 2024-02-01 RX ADMIN — LIDOCAINE HYDROCHLORIDE 50 MG: 20 INJECTION, SOLUTION EPIDURAL; INFILTRATION; INTRACAUDAL; PERINEURAL at 15:36

## 2024-02-01 RX ADMIN — FENTANYL CITRATE 100 MCG: 50 INJECTION, SOLUTION INTRAMUSCULAR; INTRAVENOUS at 15:36

## 2024-02-01 RX ADMIN — GABAPENTIN 100 MG: 100 CAPSULE ORAL at 20:24

## 2024-02-01 RX ADMIN — CEFTRIAXONE SODIUM 1000 MG: 1 INJECTION, POWDER, FOR SOLUTION INTRAMUSCULAR; INTRAVENOUS at 08:08

## 2024-02-01 RX ADMIN — LACTULOSE 30 G: 20 SOLUTION ORAL at 20:24

## 2024-02-01 RX ADMIN — FUROSEMIDE 40 MG: 10 INJECTION, SOLUTION INTRAMUSCULAR; INTRAVENOUS at 13:25

## 2024-02-01 RX ADMIN — INSULIN HUMAN 2 UNITS: 100 INJECTION, SOLUTION PARENTERAL at 05:07

## 2024-02-01 RX ADMIN — OXYCODONE 5 MG: 5 TABLET ORAL at 01:11

## 2024-02-01 RX ADMIN — GLYCOPYRROLATE 0.1 MG: 0.2 INJECTION INTRAMUSCULAR; INTRAVENOUS at 15:36

## 2024-02-01 RX ADMIN — OCTREOTIDE ACETATE 50 MCG/HR: 500 INJECTION, SOLUTION INTRAVENOUS; SUBCUTANEOUS at 18:14

## 2024-02-01 RX ADMIN — PROPOFOL 100 MG: 10 INJECTION, EMULSION INTRAVENOUS at 15:36

## 2024-02-01 RX ADMIN — SODIUM CHLORIDE 8 MG/HR: 900 INJECTION INTRAVENOUS at 21:23

## 2024-02-01 RX ADMIN — RIFAXIMIN 550 MG: 550 TABLET ORAL at 09:29

## 2024-02-01 RX ADMIN — SERTRALINE HYDROCHLORIDE 100 MG: 100 TABLET ORAL at 20:24

## 2024-02-01 RX ADMIN — GABAPENTIN 100 MG: 100 CAPSULE ORAL at 09:29

## 2024-02-01 RX ADMIN — SODIUM CHLORIDE 8 MG/HR: 900 INJECTION INTRAVENOUS at 18:14

## 2024-02-01 RX ADMIN — ROPINIROLE HYDROCHLORIDE 1 MG: 1 TABLET, FILM COATED ORAL at 20:24

## 2024-02-01 RX ADMIN — OXYCODONE 5 MG: 5 TABLET ORAL at 09:29

## 2024-02-01 RX ADMIN — OXYCODONE 5 MG: 5 TABLET ORAL at 05:01

## 2024-02-01 NOTE — ANESTHESIA POSTPROCEDURE EVALUATION
Patient: Nic Nicolas    Procedure Summary       Date: 02/01/24 Room / Location: [facility] ENDOSCOPY 1 / [facility] ENDOSCOPY    Anesthesia Start: 1534 Anesthesia Stop: 1623    Procedure: ESOPHAGOGASTRODUODENOSCOPY WITH APC APPLICATION Diagnosis:       Acute blood loss anemia      (Acute blood loss anemia [D62])    Surgeons: Andrea Jansen MD Provider: Ranjeet Pritchard CRNA    Anesthesia Type: general ASA Status: 4            Anesthesia Type: general    Vitals  Vitals Value Taken Time   /68 02/01/24 1637   Temp 36.1 °C (97 °F) 02/01/24 1636   Pulse 79 02/01/24 1638   Resp 18 02/01/24 1636   SpO2 100 % 02/01/24 1638   Vitals shown include unfiled device data.        Post Anesthesia Care and Evaluation    Patient location during evaluation: bedside  Patient participation: complete - patient participated  Level of consciousness: awake and alert and responsive to verbal stimuli  Pain score: 0  Pain management: adequate    Airway patency: patent  Anesthetic complications: No anesthetic complications  PONV Status: controlled  Cardiovascular status: acceptable and stable  Respiratory status: acceptable

## 2024-02-01 NOTE — ANESTHESIA PREPROCEDURE EVALUATION
Anesthesia Evaluation                  Airway   Mallampati: II  TM distance: >3 FB  Neck ROM: full  No difficulty expected  Dental    (+) upper dentures and poor dentition    Pulmonary     breath sounds clear to auscultation  (+) asthma,sleep apnea  Cardiovascular   Exercise tolerance: poor (<4 METS)    ECG reviewed  Rhythm: regular  Rate: normal    (+) hypertension, CHF , DVT, hyperlipidemia      Neuro/Psych  (+) CVA, psychiatric history Anxiety and Depression  GI/Hepatic/Renal/Endo    (+) morbid obesity, GERD, PUD, GI bleeding , hepatitis, liver disease cirrhosis, renal disease-, diabetes mellitus poorly controlled    Musculoskeletal     Abdominal   (+) obese    Abdomen: soft.   Substance History      OB/GYN          Other   arthritis,     ROS/Med Hx Other: Labs:   02/01/24 11:53  Hemoglobin: 6.9 (C)  Hematocrit: 23.5 (L)    EKG 04/19/23: HR 91, SR    ECHO 01/20/22:   ·Saline test results are negative.  ·Estimated left ventricular EF = 59% Left ventricular systolic function is normal.  ·Left ventricular diastolic function was normal.        Phys Exam Other: Slightly jaundiced  Slightly drowsy but arousable for assessment and oriented to questions,person,  time, place and situation    Has #22g and #18g IV's in right arm and #20 g IV in left upper arm, receiving 3rd unit of PRBC's and protonix and octreotide gtt's                Anesthesia Plan    ASA 4     general   total IV anesthesia  (Total IV Anesthesia    Patient understands anesthesia not responsible for dental damage.  )  intravenous induction     Anesthetic plan, risks, benefits, and alternatives have been provided, discussed and informed consent has been obtained with: patient and other.  Pre-procedure education provided  Plan discussed with CRNA.      CODE STATUS:    Code Status (Patient has no pulse and is not breathing): CPR (Attempt to Resuscitate)  Medical Interventions (Patient has pulse or is breathing): Full Support

## 2024-02-01 NOTE — PLAN OF CARE
Goal Outcome Evaluation:         VSS. Medicated for pain 2 times per shift. Went down for EGD today, pt tolerated well. Full liquid diet advance as tolerated. Hgb stablized. Continuing protonix, and octreotide until AM. No complaints at this time.                                       Shilo Thakkar is a 25 y.o. male on day 1 of admission presenting with Metastasis from pancreatic cancer (CMS/HCC).      Subjective   NAEO. Pt denies f/c, n/v, abdominal pain, chest pain, SOB or difficulty breathing. No worsening swelling with neck mass. States he has an appetite and has eaten/drank overnight like usual. Pending transfer to [facility] [place].       Objective     Last Recorded Vitals  /69 (BP Location: Right arm, Patient Position: Lying)   Pulse 52   Temp 37 °C (98.6 °F) (Temporal)   Resp 18   Wt 72.2 kg (159 lb 2.8 oz)   SpO2 96%   Intake/Output last 3 Shifts:    Intake/Output Summary (Last 24 hours) at 10/20/2023 1252  Last data filed at 10/20/2023 1150  Gross per 24 hour   Intake 1990 ml   Output --   Net 1990 ml       Admission Weight  Weight: 74.8 kg (165 lb) (10/19/23 1644)    Daily Weight  10/19/23 : 72.2 kg (159 lb 2.8 oz)    Image Results  CT abdomen pelvis w IV contrast  Addendum: This finding was discussed with and acknowledged by Dr. Garry Rios   on 10/19/2023 at 8:05 PM   Signed by Gerry Abrams MD  Narrative: STUDY:  CT Abdomen and Pelvis with IV Contrast; 10/19/2023 at 6:59 p.m.  INDICATION:  Right lower quadrant abdominal pain.  Additional History:  Strong  family history of pancreatic cancer.  COMPARISON:  None Available.  ACCESSION NUMBER(S):  [id number]  ORDERING CLINICIAN:  GARRY RIOS  TECHNIQUE:  CT of the abdomen and pelvis was performed.  Contiguous axial images  were obtained at 3 mm slice thickness through the abdomen and pelvis.   Coronal and sagittal reconstructions at 3 mm slice thickness were  performed.  Omnipaque 350 125 mL was administered intravenously.    FINDINGS:  LOWER CHEST:  No cardiomegaly.  No pericardial effusion.  There are moderate right  and small left pleural effusions with associated atelectasis.     ABDOMEN:     LIVER:  No hepatomegaly.  Smooth surface contour.  Normal attenuation.     BILE DUCTS:  No intrahepatic or extrahepatic biliary ductal  dilatation.     GALLBLADDER:  The gallbladder is unremarkable.  STOMACH:  No abnormalities identified.     PANCREAS:  There is a mass in the body the pancreas measuring approximately 2.7 x  2.9 cm. The pancreatic duct is dilated.     SPLEEN:  No splenomegaly or focal splenic lesion.     ADRENAL GLANDS:  No thickening or nodules.     KIDNEYS AND URETERS:  Kidneys are normal in size and location.  No renal or ureteral  calculi.     PELVIS:     BLADDER:  No abnormalities identified.     REPRODUCTIVE ORGANS:  No abnormalities identified.     BOWEL:  No abnormalities identified.     VESSELS:  No abnormalities identified.  Abdominal aorta is normal in caliber.      PERITONEUM/RETROPERITONEUM/LYMPH NODES:  There are multiple masses in the mesentery. The largest measures  approximately 7.1 x 5.4 cm. There is omental caking. No  pneumoperitoneum. There is a moderate amount of free fluid throughout  the abdomen and pelvis.  No lymphadenopathy.     ABDOMINAL WALL:  No abnormalities identified.  SOFT TISSUES:   No abnormalities identified.     BONES:  No acute fracture or aggressive osseous lesion.  Impression: 1. There is a mass in the body of the pancreas which likely represents  pancreatic cancer. There are multiple masses in the mesentery as well  as omental caking. These are consistent with carcinomatosis.  2. Moderate amount of free fluid throughout the abdomen and pelvis.  3. Moderate right and small left pleural effusions.  Signed by Gerry Abrams MD  CT soft tissue neck w IV contrast  Narrative: Interpreted By:  Wallace Mccall,   STUDY:  CT SOFT TISSUE NECK W IV CONTRAST;  10/19/2023 6:53 pm      INDICATION:  Signs/Symptoms:L abscess vs mass.      COMPARISON:  None.      ACCESSION NUMBER(S):  [id number]      ORDERING CLINICIAN:  GARRY PETERSEN      TECHNIQUE:  Axial CT images of the neck were obtained.  The patient received 125  mL Omnipaque 350 intravenous contrast agent. The images were  reformatted in angled  axial, coronal and sagittal planes.      FINDINGS:  Oral Cavity, Pharynx and Larynx:  There is a homogeneous soft tissue  attenuation lesion in the left oropharynx which measures  approximately 3.0 x 4.0 x 5.1 cm (coronal image 50). The airway is  mildly narrowed and the adjacent parapharyngeal fat is effaced. The  oral cavity, nasopharynx, hypopharynx, and larynx are unremarkable.      Retropharyngeal and Prevertebral Soft Tissues: Unremarkable.      Lymph nodes: Mildly enlarged left cervical lymph nodes, most  prominently a 1.2 x 1.8 cm left level IIa lymph node (axial image 55).      Neck vessels: Bilateral neck vessels are normal in course and caliber  and appear patent.      Thyroid gland: Incidental subcentimeter low-attenuation nodules in  the left lobe of the thyroid, no specific imaging follow-up is  recommended.      Parotid and submandibular glands: Bilateral parotid and submandibular  glands are unremarkable in appearance.      Paranasal Sinuses and Mastoids: Scattered paranasal sinus mucosal  thickening. The mastoid air cells are clear.      The orbits are unremarkable. The visualized intracranial contents are  unremarkable.      Small pleural effusions bilaterally with associated dependent  pulmonary opacities.      Mild degenerative changes in the spine.      Impression: 1. Homogeneous soft tissue attenuation lesion in the left oropharynx.  This is concerning for a neoplasm but is otherwise nonspecific,  recommend direct visualization and/or tissue sampling for further  evaluation.  2. Enlarged left cervical lymph nodes are nonspecific, further  follow-up as clinically indicated.  3. Small pleural effusions bilaterally.      MACRO:  None      Signed by: Wallace Mccall 10/19/2023 7:05 PM  Dictation workstation:   [id number]      Physical Exam  Constitutional:       General: He is not in acute distress.     Comments: Thin appearing male, resting in bed comfortably.   HENT:      Head: Normocephalic and  atraumatic.   Eyes:      Conjunctiva/sclera: Conjunctivae normal.   Neck:      Comments: Enlarged left tonsil.  Cardiovascular:      Rate and Rhythm: Normal rate and regular rhythm.      Heart sounds: No murmur heard.     No friction rub. No gallop.   Pulmonary:      Effort: Pulmonary effort is normal.      Breath sounds: Normal breath sounds. No wheezing, rhonchi or rales.   Abdominal:      General: Abdomen is flat. Bowel sounds are normal. There is no distension.      Palpations: Abdomen is soft.      Tenderness: There is no abdominal tenderness.   Musculoskeletal:         General: Normal range of motion.   Skin:     General: Skin is warm and dry.      Coloration: Skin is pale.   Neurological:      General: No focal deficit present.      Mental Status: He is alert. Mental status is at baseline.   Psychiatric:         Mood and Affect: Mood normal.         Behavior: Behavior normal.         Relevant Results  Scheduled medications  ampicillin-sulbactam, 3 g, intravenous, q6h  enoxaparin, 40 mg, subcutaneous, q24h  fentaNYL PF, , ,   midazolam, , ,   sennosides-docusate sodium, 2 tablet, oral, BID      Continuous medications     PRN medications  PRN medications: acetaminophen, fentaNYL PF, midazolam, oxyCODONE-acetaminophen    Results for orders placed or performed during the hospital encounter of 10/19/23 (from the past 24 hour(s))   CBC and Auto Differential   Result Value Ref Range    WBC 7.0 4.4 - 11.3 x10*3/uL    nRBC 0.0 0.0 - 0.0 /100 WBCs    RBC 4.64 4.50 - 5.90 x10*6/uL    Hemoglobin 13.7 13.5 - 17.5 g/dL    Hematocrit 39.8 (L) 41.0 - 52.0 %    MCV 86 80 - 100 fL    MCH 29.5 26.0 - 34.0 pg    MCHC 34.4 32.0 - 36.0 g/dL    RDW 12.8 11.5 - 14.5 %    Platelets 331 150 - 450 x10*3/uL    MPV 9.6 7.5 - 11.5 fL    Neutrophils % 75.4 40.0 - 80.0 %    Immature Granulocytes %, Automated 0.3 0.0 - 0.9 %    Lymphocytes % 16.4 13.0 - 44.0 %    Monocytes % 6.9 2.0 - 10.0 %    Eosinophils % 0.6 0.0 - 6.0 %    Basophils %  0.4 0.0 - 2.0 %    Neutrophils Absolute 5.28 1.20 - 7.70 x10*3/uL    Immature Granulocytes Absolute, Automated 0.02 0.00 - 0.70 x10*3/uL    Lymphocytes Absolute 1.15 (L) 1.20 - 4.80 x10*3/uL    Monocytes Absolute 0.48 0.10 - 1.00 x10*3/uL    Eosinophils Absolute 0.04 0.00 - 0.70 x10*3/uL    Basophils Absolute 0.03 0.00 - 0.10 x10*3/uL   Comprehensive metabolic panel   Result Value Ref Range    Glucose 87 74 - 99 mg/dL    Sodium 138 136 - 145 mmol/L    Potassium 4.1 3.5 - 5.3 mmol/L    Chloride 96 (L) 98 - 107 mmol/L    Bicarbonate 29 21 - 32 mmol/L    Anion Gap 17 10 - 20 mmol/L    Urea Nitrogen 12 6 - 23 mg/dL    Creatinine 1.19 0.50 - 1.30 mg/dL    eGFR 87 >60 mL/min/1.73m*2    Calcium 8.9 8.6 - 10.3 mg/dL    Albumin 4.0 3.4 - 5.0 g/dL    Alkaline Phosphatase 58 33 - 120 U/L    Total Protein 6.4 6.4 - 8.2 g/dL    AST 38 9 - 39 U/L    Bilirubin, Total 0.5 0.0 - 1.2 mg/dL    ALT 14 10 - 52 U/L   Group A Streptococcus, PCR    Specimen: Throat/Pharynx; Swab   Result Value Ref Range    Group A Strep PCR Not Detected Not Detected   Mononucleosis screen   Result Value Ref Range    Mononucleosis Screen Negative Negative   Carcinoembryonic Antigen   Result Value Ref Range    Carcinoembryonic AG <0.5 ug/L   AFP Tumor Marker   Result Value Ref Range    Alpha-Fetoprotein 4 0 - 9 ng/mL   Creatinine, Serum   Result Value Ref Range    Creatinine 1.24 0.50 - 1.30 mg/dL    eGFR 83 >60 mL/min/1.73m*2   CBC   Result Value Ref Range    WBC 6.7 4.4 - 11.3 x10*3/uL    nRBC 0.0 0.0 - 0.0 /100 WBCs    RBC 4.30 (L) 4.50 - 5.90 x10*6/uL    Hemoglobin 12.4 (L) 13.5 - 17.5 g/dL    Hematocrit 37.1 (L) 41.0 - 52.0 %    MCV 86 80 - 100 fL    MCH 28.8 26.0 - 34.0 pg    MCHC 33.4 32.0 - 36.0 g/dL    RDW 12.4 11.5 - 14.5 %    Platelets 327 150 - 450 x10*3/uL    MPV 9.9 7.5 - 11.5 fL   Renal function panel   Result Value Ref Range    Glucose 100 (H) 74 - 99 mg/dL    Sodium 138 136 - 145 mmol/L    Potassium 4.8 3.5 - 5.3 mmol/L    Chloride 101 98 -  107 mmol/L    Bicarbonate 28 21 - 32 mmol/L    Anion Gap 14 10 - 20 mmol/L    Urea Nitrogen 17 6 - 23 mg/dL    Creatinine 1.09 0.50 - 1.30 mg/dL    eGFR >90 >60 mL/min/1.73m*2    Calcium 8.7 8.6 - 10.3 mg/dL    Phosphorus 5.2 (H) 2.5 - 4.9 mg/dL    Albumin 3.4 3.4 - 5.0 g/dL   Magnesium   Result Value Ref Range    Magnesium 1.99 1.60 - 2.40 mg/dL         Assessment/Plan   Shilo Thakkar is a 25 y.o. male presenting for abdominal pain and dysphagia most likely 2/2 metastatic pancreatic adenocarcinoma with family history significant for multiple direct male relatives with early onset pancreatic cancer. Pt accepted for transfer to [facility] for further workup pending bed, admit to [facility] [place] for initial assessment including staging scan, CT chest, and tumor markers.      Acute medical issues  # Abdominal pain  # Pancreatic body mass  # Mesentery masses concerning for carcinomatosis   # Pleural effusions  :: CT AP with a mass in the body of the pancreas concerning for pancreatic cancer with carcinomatosis, moderate right and small left pleural effusions.  - CT chest pending   - CEA and AFP negative  - CA 19-9 and  ordered and pending  - IR consulted, pending tissue bx on 10/20  - Further staging with NM PET CT whole body head and neck initial staging at [facility]  - Pain management with acetaminophen 975mg every 8hrs for pain mild to moderate, percocet  5-325mg Q6 hours for severe pain  - GI consulted for possible upper endoscopy per [facility] admitting oncology hospitalist Dr Esteban  - ENT contacted, they do not perform biopsies, recommended we reach out to IR  - Oncology consulted at [facility] for initial staging and further diagnostic recommendations  - pending transfer to [facility] for further work up     # Neck Oropharynx Lesion  - Potentially related to malignancy  - Pt reportedly strep throat negative at urgent care  - Unasyn continued in event L tonsillar swelling is an infectious tonsillar abscess  - ENT declined possible biopsy, suggested  IR to perform  - SLP to evaluate dysphagia        F PO  E Replete as needed  N Regular  G Senna 8.6mg PRN  Code status: Full Code  NOK: Deidra Pickett (Mother) - [phone]     Dispo: 25 y.o male admitted with abdominal pain, likely 2/2 pancreatic cancer with metastases. Pending transfer to [facility] for further management. Initial diagnostic workup including biopsies to be done at [facility] [place].      Principal Problem:    Metastasis from pancreatic cancer (CMS/HCC)        Itzel More MD

## 2024-02-01 NOTE — PLAN OF CARE
Goal Outcome Evaluation:      Pt arrived to unit @2024, alert and oriented, is able to make needs known to staff, hgb 5.7 when admitted, pt given 2 units of RBC per order, awaiting repeat hgb draw in am, NPO after mid for EGD in am, VS stable, prn pain meds given throughout the shift for c/o generalized pain, and were effective, will follow plan of care.

## 2024-02-01 NOTE — INTERVAL H&P NOTE
The risks, benefits, alternatives of the procedures were discussed with the patient at length.  All questions were answered appropriately.  Patient voices understanding and agreed to proceed with the procedure under MAC.    H&P updated. The patient was examined and the following changes are noted:  Patient Hgb this morning was 6.9 gm/dl, receiving another unit of blood. No obvious GI bleed. Hemodynamics stable. Will proceed with EGD

## 2024-02-02 VITALS
BODY MASS INDEX: 47.74 KG/M2 | OXYGEN SATURATION: 96 % | WEIGHT: 315 LBS | TEMPERATURE: 97.8 F | DIASTOLIC BLOOD PRESSURE: 68 MMHG | HEART RATE: 84 BPM | RESPIRATION RATE: 18 BRPM | SYSTOLIC BLOOD PRESSURE: 111 MMHG | HEIGHT: 68 IN

## 2024-02-02 LAB
ANION GAP SERPL CALCULATED.3IONS-SCNC: 9.9 MMOL/L (ref 5–15)
BASOPHILS # BLD AUTO: 0.04 10*3/MM3 (ref 0–0.2)
BASOPHILS NFR BLD AUTO: 0.9 % (ref 0–1.5)
BH BB BLOOD EXPIRATION DATE: NORMAL
BH BB BLOOD TYPE BARCODE: 6200
BH BB DISPENSE STATUS: NORMAL
BH BB PRODUCT CODE: NORMAL
BH BB UNIT NUMBER: NORMAL
BUN SERPL-MCNC: 22 MG/DL (ref 6–20)
BUN/CREAT SERPL: 20.2 (ref 7–25)
CALCIUM SPEC-SCNC: 8.3 MG/DL (ref 8.6–10.5)
CHLORIDE SERPL-SCNC: 101 MMOL/L (ref 98–107)
CO2 SERPL-SCNC: 22.1 MMOL/L (ref 22–29)
CREAT SERPL-MCNC: 1.09 MG/DL (ref 0.76–1.27)
CROSSMATCH INTERPRETATION: NORMAL
DEPRECATED RDW RBC AUTO: 52.4 FL (ref 37–54)
EGFRCR SERPLBLD CKD-EPI 2021: 79.2 ML/MIN/1.73
EOSINOPHIL # BLD AUTO: 0.1 10*3/MM3 (ref 0–0.4)
EOSINOPHIL NFR BLD AUTO: 2.3 % (ref 0.3–6.2)
ERYTHROCYTE [DISTWIDTH] IN BLOOD BY AUTOMATED COUNT: 19.1 % (ref 12.3–15.4)
GLUCOSE BLDC GLUCOMTR-MCNC: 174 MG/DL (ref 70–99)
GLUCOSE BLDC GLUCOMTR-MCNC: 198 MG/DL (ref 70–99)
GLUCOSE SERPL-MCNC: 217 MG/DL (ref 65–99)
HCT VFR BLD AUTO: 24.3 % (ref 37.5–51)
HCT VFR BLD AUTO: 25.3 % (ref 37.5–51)
HCT VFR BLD AUTO: 26.8 % (ref 37.5–51)
HGB BLD-MCNC: 7.4 G/DL (ref 13–17.7)
HGB BLD-MCNC: 7.5 G/DL (ref 13–17.7)
HGB BLD-MCNC: 7.9 G/DL (ref 13–17.7)
IMM GRANULOCYTES # BLD AUTO: 0.02 10*3/MM3 (ref 0–0.05)
IMM GRANULOCYTES NFR BLD AUTO: 0.5 % (ref 0–0.5)
LYMPHOCYTES # BLD AUTO: 0.77 10*3/MM3 (ref 0.7–3.1)
LYMPHOCYTES NFR BLD AUTO: 17.7 % (ref 19.6–45.3)
MCH RBC QN AUTO: 23.1 PG (ref 26.6–33)
MCHC RBC AUTO-ENTMCNC: 30.5 G/DL (ref 31.5–35.7)
MCV RBC AUTO: 75.7 FL (ref 79–97)
MONOCYTES # BLD AUTO: 0.55 10*3/MM3 (ref 0.1–0.9)
MONOCYTES NFR BLD AUTO: 12.6 % (ref 5–12)
NEUTROPHILS NFR BLD AUTO: 2.87 10*3/MM3 (ref 1.7–7)
NEUTROPHILS NFR BLD AUTO: 66 % (ref 42.7–76)
NRBC BLD AUTO-RTO: 0 /100 WBC (ref 0–0.2)
PLATELET # BLD AUTO: 63 10*3/MM3 (ref 140–450)
PMV BLD AUTO: ABNORMAL FL
POTASSIUM SERPL-SCNC: 4.8 MMOL/L (ref 3.5–5.2)
RBC # BLD AUTO: 3.21 10*6/MM3 (ref 4.14–5.8)
SODIUM SERPL-SCNC: 133 MMOL/L (ref 136–145)
UNIT  ABO: NORMAL
UNIT  RH: NORMAL
WBC NRBC COR # BLD AUTO: 4.35 10*3/MM3 (ref 3.4–10.8)

## 2024-02-02 PROCEDURE — 85018 HEMOGLOBIN: CPT | Performed by: INTERNAL MEDICINE

## 2024-02-02 PROCEDURE — 85014 HEMATOCRIT: CPT | Performed by: INTERNAL MEDICINE

## 2024-02-02 PROCEDURE — 99239 HOSP IP/OBS DSCHRG MGMT >30: CPT | Performed by: FAMILY MEDICINE

## 2024-02-02 PROCEDURE — 63710000001 INSULIN LISPRO (HUMAN) PER 5 UNITS: Performed by: FAMILY MEDICINE

## 2024-02-02 PROCEDURE — 80048 BASIC METABOLIC PNL TOTAL CA: CPT | Performed by: INTERNAL MEDICINE

## 2024-02-02 PROCEDURE — 82948 REAGENT STRIP/BLOOD GLUCOSE: CPT | Performed by: FAMILY MEDICINE

## 2024-02-02 PROCEDURE — 25010000002 OCTREOTIDE PER 25 MCG: Performed by: FAMILY MEDICINE

## 2024-02-02 RX ORDER — FERROUS GLUCONATE 324(38)MG
324 TABLET ORAL
Qty: 30 TABLET | Refills: 0 | Status: SHIPPED | OUTPATIENT
Start: 2024-02-02 | End: 2024-03-03

## 2024-02-02 RX ORDER — SUCRALFATE 1 G/1
1 TABLET ORAL
Qty: 87 TABLET | Refills: 0 | Status: SHIPPED | OUTPATIENT
Start: 2024-02-02 | End: 2024-02-24

## 2024-02-02 RX ORDER — GABAPENTIN 100 MG/1
100 CAPSULE ORAL DAILY
Qty: 2 CAPSULE | Refills: 0 | Status: SHIPPED | OUTPATIENT
Start: 2024-02-02 | End: 2024-02-04

## 2024-02-02 RX ORDER — OXYCODONE HYDROCHLORIDE 10 MG/1
10 TABLET ORAL EVERY 4 HOURS PRN
Qty: 12 TABLET | Refills: 0 | Status: SHIPPED | OUTPATIENT
Start: 2024-02-02 | End: 2024-02-04

## 2024-02-02 RX ORDER — PANTOPRAZOLE SODIUM 40 MG/1
40 TABLET, DELAYED RELEASE ORAL 2 TIMES DAILY
Qty: 60 TABLET | Refills: 2 | Status: SHIPPED | OUTPATIENT
Start: 2024-02-02 | End: 2024-05-02

## 2024-02-02 RX ADMIN — INSULIN LISPRO 2 UNITS: 100 INJECTION, SOLUTION INTRAVENOUS; SUBCUTANEOUS at 09:13

## 2024-02-02 RX ADMIN — INSULIN LISPRO 2 UNITS: 100 INJECTION, SOLUTION INTRAVENOUS; SUBCUTANEOUS at 12:06

## 2024-02-02 RX ADMIN — OCTREOTIDE ACETATE 50 MCG/HR: 500 INJECTION, SOLUTION INTRAVENOUS; SUBCUTANEOUS at 03:36

## 2024-02-02 RX ADMIN — SODIUM CHLORIDE 8 MG/HR: 900 INJECTION INTRAVENOUS at 06:01

## 2024-02-02 RX ADMIN — RIFAXIMIN 550 MG: 550 TABLET ORAL at 09:12

## 2024-02-02 RX ADMIN — Medication 10 ML: at 09:13

## 2024-02-02 RX ADMIN — PANTOPRAZOLE SODIUM 40 MG: 40 TABLET, DELAYED RELEASE ORAL at 09:12

## 2024-02-02 RX ADMIN — OXYCODONE 10 MG: 5 TABLET ORAL at 06:59

## 2024-02-02 RX ADMIN — GABAPENTIN 100 MG: 100 CAPSULE ORAL at 09:13

## 2024-02-02 RX ADMIN — SUCRALFATE 1 G: 1 TABLET ORAL at 09:12

## 2024-02-02 RX ADMIN — SODIUM CHLORIDE 8 MG/HR: 900 INJECTION INTRAVENOUS at 01:40

## 2024-02-02 RX ADMIN — FUROSEMIDE 40 MG: 40 TABLET ORAL at 09:13

## 2024-02-02 RX ADMIN — BUSPIRONE HYDROCHLORIDE 7.5 MG: 7.5 TABLET ORAL at 09:12

## 2024-02-02 RX ADMIN — SUCRALFATE 1 G: 1 TABLET ORAL at 12:06

## 2024-02-02 RX ADMIN — OXYCODONE 10 MG: 5 TABLET ORAL at 12:05

## 2024-02-02 RX ADMIN — LACTULOSE 30 G: 20 SOLUTION ORAL at 09:13

## 2024-02-02 RX ADMIN — OXYCODONE 10 MG: 5 TABLET ORAL at 01:11
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
## 2024-02-02 NOTE — PLAN OF CARE
Goal Outcome Evaluation:  Plan of Care Reviewed With: patient           Outcome Evaluation: Pt alert and oriented. Pt is on room air. Pt pain treated per MAR. Pt to discharge later this shift. Continue with plan of care.

## 2024-02-12 ENCOUNTER — TELEPHONE (OUTPATIENT)
Dept: GASTROENTEROLOGY | Facility: CLINIC | Age: 59
End: 2024-02-12
Payer: MEDICAID

## 2024-02-19 ENCOUNTER — HOSPITAL ENCOUNTER (INPATIENT)
Facility: HOSPITAL | Age: 59
LOS: 3 days | Discharge: NURSING, REHAB OR LONG-TERM CARE | End: 2024-02-22
Attending: EMERGENCY MEDICINE | Admitting: STUDENT IN AN ORGANIZED HEALTH CARE EDUCATION/TRAINING PROGRAM
Payer: MEDICAID

## 2024-02-19 ENCOUNTER — APPOINTMENT (OUTPATIENT)
Dept: CT IMAGING | Facility: HOSPITAL | Age: 59
End: 2024-02-19
Payer: MEDICAID

## 2024-02-19 DIAGNOSIS — D50.0 IRON DEFICIENCY ANEMIA DUE TO CHRONIC BLOOD LOSS: ICD-10-CM

## 2024-02-19 DIAGNOSIS — R10.9 ABDOMINAL PAIN, UNSPECIFIED ABDOMINAL LOCATION: ICD-10-CM

## 2024-02-19 DIAGNOSIS — G89.29 CHRONIC LOW BACK PAIN, UNSPECIFIED BACK PAIN LATERALITY, UNSPECIFIED WHETHER SCIATICA PRESENT: Chronic | ICD-10-CM

## 2024-02-19 DIAGNOSIS — R31.0 GROSS HEMATURIA: ICD-10-CM

## 2024-02-19 DIAGNOSIS — K27.4 GASTROINTESTINAL HEMORRHAGE ASSOCIATED WITH PEPTIC ULCER: ICD-10-CM

## 2024-02-19 DIAGNOSIS — R26.2 DIFFICULTY WALKING: ICD-10-CM

## 2024-02-19 DIAGNOSIS — M54.50 CHRONIC LOW BACK PAIN, UNSPECIFIED BACK PAIN LATERALITY, UNSPECIFIED WHETHER SCIATICA PRESENT: Chronic | ICD-10-CM

## 2024-02-19 DIAGNOSIS — D64.9 ANEMIA, UNSPECIFIED TYPE: Primary | ICD-10-CM

## 2024-02-19 DIAGNOSIS — Z87.11 HISTORY OF BLEEDING ULCERS: ICD-10-CM

## 2024-02-19 LAB
ABO GROUP BLD: NORMAL
ALBUMIN SERPL-MCNC: 2.9 G/DL (ref 3.5–5.2)
ALBUMIN/GLOB SERPL: 1.1 G/DL
ALP SERPL-CCNC: 99 U/L (ref 39–117)
ALT SERPL W P-5'-P-CCNC: 19 U/L (ref 1–41)
AMMONIA BLD-SCNC: 63 UMOL/L (ref 16–60)
ANION GAP SERPL CALCULATED.3IONS-SCNC: 10.8 MMOL/L (ref 5–15)
ANISOCYTOSIS BLD QL: NORMAL
AST SERPL-CCNC: 32 U/L (ref 1–40)
BASOPHILS # BLD AUTO: 0.03 10*3/MM3 (ref 0–0.2)
BASOPHILS NFR BLD AUTO: 0.7 % (ref 0–1.5)
BILIRUB SERPL-MCNC: 1.1 MG/DL (ref 0–1.2)
BLD GP AB SCN SERPL QL: NEGATIVE
BUN SERPL-MCNC: 21 MG/DL (ref 6–20)
BUN/CREAT SERPL: 24.1 (ref 7–25)
CALCIUM SPEC-SCNC: 8.4 MG/DL (ref 8.6–10.5)
CHLORIDE SERPL-SCNC: 104 MMOL/L (ref 98–107)
CO2 SERPL-SCNC: 25.2 MMOL/L (ref 22–29)
CREAT SERPL-MCNC: 0.87 MG/DL (ref 0.76–1.27)
DEPRECATED RDW RBC AUTO: 63.8 FL (ref 37–54)
EGFRCR SERPLBLD CKD-EPI 2021: 100.6 ML/MIN/1.73
EOSINOPHIL # BLD AUTO: 0.07 10*3/MM3 (ref 0–0.4)
EOSINOPHIL NFR BLD AUTO: 1.7 % (ref 0.3–6.2)
ERYTHROCYTE [DISTWIDTH] IN BLOOD BY AUTOMATED COUNT: 22.8 % (ref 12.3–15.4)
GLOBULIN UR ELPH-MCNC: 2.7 GM/DL
GLUCOSE BLDC GLUCOMTR-MCNC: 115 MG/DL (ref 70–99)
GLUCOSE SERPL-MCNC: 203 MG/DL (ref 65–99)
HCT VFR BLD AUTO: 23.1 % (ref 37.5–51)
HEMOCCULT STL QL IA: NEGATIVE
HGB BLD-MCNC: 6.9 G/DL (ref 13–17.7)
HOLD SPECIMEN: NORMAL
HOLD SPECIMEN: NORMAL
IMM GRANULOCYTES # BLD AUTO: 0.02 10*3/MM3 (ref 0–0.05)
IMM GRANULOCYTES NFR BLD AUTO: 0.5 % (ref 0–0.5)
INR PPP: 1.79 (ref 0.86–1.15)
LYMPHOCYTES # BLD AUTO: 0.62 10*3/MM3 (ref 0.7–3.1)
LYMPHOCYTES NFR BLD AUTO: 15.5 % (ref 19.6–45.3)
MCH RBC QN AUTO: 23.3 PG (ref 26.6–33)
MCHC RBC AUTO-ENTMCNC: 29.9 G/DL (ref 31.5–35.7)
MCV RBC AUTO: 78 FL (ref 79–97)
MONOCYTES # BLD AUTO: 0.47 10*3/MM3 (ref 0.1–0.9)
MONOCYTES NFR BLD AUTO: 11.7 % (ref 5–12)
NEUTROPHILS NFR BLD AUTO: 2.8 10*3/MM3 (ref 1.7–7)
NEUTROPHILS NFR BLD AUTO: 69.9 % (ref 42.7–76)
NRBC BLD AUTO-RTO: 0 /100 WBC (ref 0–0.2)
PLATELET # BLD AUTO: 92 10*3/MM3 (ref 140–450)
PMV BLD AUTO: 10.1 FL (ref 6–12)
POIKILOCYTOSIS BLD QL SMEAR: NORMAL
POTASSIUM SERPL-SCNC: 4.1 MMOL/L (ref 3.5–5.2)
PROT SERPL-MCNC: 5.6 G/DL (ref 6–8.5)
PROTHROMBIN TIME: 21.1 SECONDS (ref 11.8–14.9)
RBC # BLD AUTO: 2.96 10*6/MM3 (ref 4.14–5.8)
RH BLD: POSITIVE
SMALL PLATELETS BLD QL SMEAR: NORMAL
SODIUM SERPL-SCNC: 140 MMOL/L (ref 136–145)
T&S EXPIRATION DATE: NORMAL
WBC MORPH BLD: NORMAL
WBC NRBC COR # BLD AUTO: 4.01 10*3/MM3 (ref 3.4–10.8)
WHOLE BLOOD HOLD COAG: NORMAL
WHOLE BLOOD HOLD SPECIMEN: NORMAL

## 2024-02-19 PROCEDURE — 82746 ASSAY OF FOLIC ACID SERUM: CPT | Performed by: PHYSICIAN ASSISTANT

## 2024-02-19 PROCEDURE — 86901 BLOOD TYPING SEROLOGIC RH(D): CPT

## 2024-02-19 PROCEDURE — 85007 BL SMEAR W/DIFF WBC COUNT: CPT | Performed by: EMERGENCY MEDICINE

## 2024-02-19 PROCEDURE — 25010000002 MORPHINE PER 10 MG: Performed by: EMERGENCY MEDICINE

## 2024-02-19 PROCEDURE — 82140 ASSAY OF AMMONIA: CPT

## 2024-02-19 PROCEDURE — 85025 COMPLETE CBC W/AUTO DIFF WBC: CPT | Performed by: EMERGENCY MEDICINE

## 2024-02-19 PROCEDURE — 86850 RBC ANTIBODY SCREEN: CPT

## 2024-02-19 PROCEDURE — 99285 EMERGENCY DEPT VISIT HI MDM: CPT

## 2024-02-19 PROCEDURE — 25510000001 IOPAMIDOL PER 1 ML: Performed by: EMERGENCY MEDICINE

## 2024-02-19 PROCEDURE — 25010000002 OCTREOTIDE PER 25 MCG

## 2024-02-19 PROCEDURE — 86900 BLOOD TYPING SEROLOGIC ABO: CPT

## 2024-02-19 PROCEDURE — 25010000002 ONDANSETRON PER 1 MG: Performed by: EMERGENCY MEDICINE

## 2024-02-19 PROCEDURE — 94799 UNLISTED PULMONARY SVC/PX: CPT

## 2024-02-19 PROCEDURE — 82948 REAGENT STRIP/BLOOD GLUCOSE: CPT

## 2024-02-19 PROCEDURE — 85610 PROTHROMBIN TIME: CPT

## 2024-02-19 PROCEDURE — 82607 VITAMIN B-12: CPT | Performed by: PHYSICIAN ASSISTANT

## 2024-02-19 PROCEDURE — 82274 ASSAY TEST FOR BLOOD FECAL: CPT

## 2024-02-19 PROCEDURE — 74177 CT ABD & PELVIS W/CONTRAST: CPT

## 2024-02-19 PROCEDURE — 80053 COMPREHEN METABOLIC PANEL: CPT | Performed by: EMERGENCY MEDICINE

## 2024-02-19 PROCEDURE — 25010000002 HYDROMORPHONE 1 MG/ML SOLUTION: Performed by: EMERGENCY MEDICINE

## 2024-02-19 PROCEDURE — 94640 AIRWAY INHALATION TREATMENT: CPT

## 2024-02-19 PROCEDURE — 99223 1ST HOSP IP/OBS HIGH 75: CPT | Performed by: STUDENT IN AN ORGANIZED HEALTH CARE EDUCATION/TRAINING PROGRAM

## 2024-02-19 PROCEDURE — 36415 COLL VENOUS BLD VENIPUNCTURE: CPT

## 2024-02-19 RX ORDER — ARFORMOTEROL TARTRATE 15 UG/2ML
15 SOLUTION RESPIRATORY (INHALATION)
Status: DISCONTINUED | OUTPATIENT
Start: 2024-02-19 | End: 2024-02-22 | Stop reason: HOSPADM

## 2024-02-19 RX ORDER — ONDANSETRON 2 MG/ML
4 INJECTION INTRAMUSCULAR; INTRAVENOUS EVERY 6 HOURS PRN
Status: DISCONTINUED | OUTPATIENT
Start: 2024-02-19 | End: 2024-02-22 | Stop reason: HOSPADM

## 2024-02-19 RX ORDER — SODIUM CHLORIDE 9 MG/ML
40 INJECTION, SOLUTION INTRAVENOUS AS NEEDED
Status: DISCONTINUED | OUTPATIENT
Start: 2024-02-19 | End: 2024-02-22 | Stop reason: HOSPADM

## 2024-02-19 RX ORDER — ONDANSETRON 2 MG/ML
4 INJECTION INTRAMUSCULAR; INTRAVENOUS ONCE
Status: COMPLETED | OUTPATIENT
Start: 2024-02-19 | End: 2024-02-19

## 2024-02-19 RX ORDER — SODIUM CHLORIDE 0.9 % (FLUSH) 0.9 %
10 SYRINGE (ML) INJECTION EVERY 12 HOURS SCHEDULED
Status: DISCONTINUED | OUTPATIENT
Start: 2024-02-20 | End: 2024-02-22 | Stop reason: HOSPADM

## 2024-02-19 RX ORDER — NICOTINE POLACRILEX 4 MG
15 LOZENGE BUCCAL
Status: DISCONTINUED | OUTPATIENT
Start: 2024-02-19 | End: 2024-02-22 | Stop reason: HOSPADM

## 2024-02-19 RX ORDER — DEXTROSE MONOHYDRATE 25 G/50ML
25 INJECTION, SOLUTION INTRAVENOUS
Status: DISCONTINUED | OUTPATIENT
Start: 2024-02-19 | End: 2024-02-22 | Stop reason: HOSPADM

## 2024-02-19 RX ORDER — NITROGLYCERIN 0.4 MG/1
0.4 TABLET SUBLINGUAL
Status: DISCONTINUED | OUTPATIENT
Start: 2024-02-19 | End: 2024-02-22 | Stop reason: HOSPADM

## 2024-02-19 RX ORDER — SODIUM CHLORIDE 0.9 % (FLUSH) 0.9 %
10 SYRINGE (ML) INJECTION AS NEEDED
Status: DISCONTINUED | OUTPATIENT
Start: 2024-02-19 | End: 2024-02-22 | Stop reason: HOSPADM

## 2024-02-19 RX ORDER — OCTREOTIDE ACETATE 50 UG/ML
50 INJECTION, SOLUTION INTRAVENOUS; SUBCUTANEOUS ONCE
Status: COMPLETED | OUTPATIENT
Start: 2024-02-19 | End: 2024-02-19

## 2024-02-19 RX ORDER — IBUPROFEN 600 MG/1
1 TABLET ORAL
Status: DISCONTINUED | OUTPATIENT
Start: 2024-02-19 | End: 2024-02-22 | Stop reason: HOSPADM

## 2024-02-19 RX ORDER — IPRATROPIUM BROMIDE AND ALBUTEROL SULFATE 2.5; .5 MG/3ML; MG/3ML
3 SOLUTION RESPIRATORY (INHALATION) EVERY 6 HOURS PRN
Status: DISCONTINUED | OUTPATIENT
Start: 2024-02-19 | End: 2024-02-22 | Stop reason: HOSPADM

## 2024-02-19 RX ORDER — PANTOPRAZOLE SODIUM 40 MG/10ML
40 INJECTION, POWDER, LYOPHILIZED, FOR SOLUTION INTRAVENOUS ONCE
Status: COMPLETED | OUTPATIENT
Start: 2024-02-19 | End: 2024-02-19

## 2024-02-19 RX ORDER — BUDESONIDE 0.5 MG/2ML
0.5 INHALANT ORAL
Status: DISCONTINUED | OUTPATIENT
Start: 2024-02-19 | End: 2024-02-20

## 2024-02-19 RX ADMIN — SODIUM CHLORIDE 8 MG/HR: 900 INJECTION INTRAVENOUS at 23:51

## 2024-02-19 RX ADMIN — IOPAMIDOL 100 ML: 755 INJECTION, SOLUTION INTRAVENOUS at 18:18

## 2024-02-19 RX ADMIN — ONDANSETRON 4 MG: 2 INJECTION INTRAMUSCULAR; INTRAVENOUS at 19:55

## 2024-02-19 RX ADMIN — BUDESONIDE 0.5 MG: 0.5 INHALANT ORAL at 23:41

## 2024-02-19 RX ADMIN — ARFORMOTEROL TARTRATE 15 MCG: 15 SOLUTION RESPIRATORY (INHALATION) at 23:41

## 2024-02-19 RX ADMIN — Medication 10 ML: at 23:02

## 2024-02-19 RX ADMIN — MORPHINE SULFATE 4 MG: 4 INJECTION, SOLUTION INTRAMUSCULAR; INTRAVENOUS at 17:15

## 2024-02-19 RX ADMIN — OCTREOTIDE ACETATE 50 MCG: 50 INJECTION, SOLUTION INTRAVENOUS; SUBCUTANEOUS at 19:55

## 2024-02-19 RX ADMIN — ONDANSETRON 4 MG: 2 INJECTION INTRAMUSCULAR; INTRAVENOUS at 17:15

## 2024-02-19 RX ADMIN — PANTOPRAZOLE SODIUM 40 MG: 40 INJECTION, POWDER, FOR SOLUTION INTRAVENOUS at 17:30

## 2024-02-19 RX ADMIN — HYDROMORPHONE HYDROCHLORIDE 1 MG: 1 INJECTION, SOLUTION INTRAMUSCULAR; INTRAVENOUS; SUBCUTANEOUS at 19:55

## 2024-02-19 NOTE — ED NOTES
patient has a bottle of Sprite at bedside, education has been provided on the importance of being NPO until seen by a provider and patient refuses to give me the Sprite bottle

## 2024-02-20 ENCOUNTER — ANESTHESIA EVENT (OUTPATIENT)
Dept: GASTROENTEROLOGY | Facility: HOSPITAL | Age: 59
End: 2024-02-20
Payer: MEDICAID

## 2024-02-20 ENCOUNTER — ANESTHESIA (OUTPATIENT)
Dept: GASTROENTEROLOGY | Facility: HOSPITAL | Age: 59
End: 2024-02-20
Payer: MEDICAID

## 2024-02-20 PROBLEM — Z87.11 HISTORY OF BLEEDING ULCERS: Status: ACTIVE | Noted: 2024-02-19

## 2024-02-20 LAB
ALBUMIN SERPL-MCNC: 2.7 G/DL (ref 3.5–5.2)
ALBUMIN/GLOB SERPL: 1.1 G/DL
ALP SERPL-CCNC: 87 U/L (ref 39–117)
ALT SERPL W P-5'-P-CCNC: 17 U/L (ref 1–41)
ANION GAP SERPL CALCULATED.3IONS-SCNC: 8.3 MMOL/L (ref 5–15)
AST SERPL-CCNC: 29 U/L (ref 1–40)
BASOPHILS # BLD AUTO: 0.03 10*3/MM3 (ref 0–0.2)
BASOPHILS NFR BLD AUTO: 0.8 % (ref 0–1.5)
BILIRUB SERPL-MCNC: 1.8 MG/DL (ref 0–1.2)
BUN SERPL-MCNC: 21 MG/DL (ref 6–20)
BUN/CREAT SERPL: 21.4 (ref 7–25)
CALCIUM SPEC-SCNC: 8.3 MG/DL (ref 8.6–10.5)
CHLORIDE SERPL-SCNC: 106 MMOL/L (ref 98–107)
CO2 SERPL-SCNC: 24.7 MMOL/L (ref 22–29)
CREAT SERPL-MCNC: 0.98 MG/DL (ref 0.76–1.27)
DEPRECATED RDW RBC AUTO: 63.2 FL (ref 37–54)
EGFRCR SERPLBLD CKD-EPI 2021: 89.9 ML/MIN/1.73
EOSINOPHIL # BLD AUTO: 0.11 10*3/MM3 (ref 0–0.4)
EOSINOPHIL NFR BLD AUTO: 3 % (ref 0.3–6.2)
ERYTHROCYTE [DISTWIDTH] IN BLOOD BY AUTOMATED COUNT: 22.2 % (ref 12.3–15.4)
FOLATE SERPL-MCNC: 13.6 NG/ML (ref 4.78–24.2)
GLOBULIN UR ELPH-MCNC: 2.5 GM/DL
GLUCOSE BLDC GLUCOMTR-MCNC: 138 MG/DL (ref 70–99)
GLUCOSE BLDC GLUCOMTR-MCNC: 144 MG/DL (ref 70–99)
GLUCOSE BLDC GLUCOMTR-MCNC: 145 MG/DL (ref 70–99)
GLUCOSE BLDC GLUCOMTR-MCNC: 159 MG/DL (ref 70–99)
GLUCOSE BLDC GLUCOMTR-MCNC: 208 MG/DL (ref 70–99)
GLUCOSE SERPL-MCNC: 123 MG/DL (ref 65–99)
HCT VFR BLD AUTO: 23.4 % (ref 37.5–51)
HCT VFR BLD AUTO: 26.7 % (ref 37.5–51)
HCT VFR BLD AUTO: 27.6 % (ref 37.5–51)
HGB BLD-MCNC: 6.9 G/DL (ref 13–17.7)
HGB BLD-MCNC: 8 G/DL (ref 13–17.7)
HGB BLD-MCNC: 8.2 G/DL (ref 13–17.7)
IMM GRANULOCYTES # BLD AUTO: 0.01 10*3/MM3 (ref 0–0.05)
IMM GRANULOCYTES NFR BLD AUTO: 0.3 % (ref 0–0.5)
IRON 24H UR-MRATE: 47 MCG/DL (ref 59–158)
IRON SATN MFR SERPL: 13 % (ref 20–50)
LYMPHOCYTES # BLD AUTO: 0.73 10*3/MM3 (ref 0.7–3.1)
LYMPHOCYTES NFR BLD AUTO: 19.8 % (ref 19.6–45.3)
MAGNESIUM SERPL-MCNC: 1.6 MG/DL (ref 1.6–2.6)
MCH RBC QN AUTO: 23.2 PG (ref 26.6–33)
MCHC RBC AUTO-ENTMCNC: 29.5 G/DL (ref 31.5–35.7)
MCV RBC AUTO: 78.8 FL (ref 79–97)
MONOCYTES # BLD AUTO: 0.43 10*3/MM3 (ref 0.1–0.9)
MONOCYTES NFR BLD AUTO: 11.7 % (ref 5–12)
NEUTROPHILS NFR BLD AUTO: 2.38 10*3/MM3 (ref 1.7–7)
NEUTROPHILS NFR BLD AUTO: 64.4 % (ref 42.7–76)
NRBC BLD AUTO-RTO: 0 /100 WBC (ref 0–0.2)
PHOSPHATE SERPL-MCNC: 3.5 MG/DL (ref 2.5–4.5)
PLATELET # BLD AUTO: 78 10*3/MM3 (ref 140–450)
PMV BLD AUTO: ABNORMAL FL
POTASSIUM SERPL-SCNC: 4.2 MMOL/L (ref 3.5–5.2)
PROT SERPL-MCNC: 5.2 G/DL (ref 6–8.5)
RBC # BLD AUTO: 2.97 10*6/MM3 (ref 4.14–5.8)
SODIUM SERPL-SCNC: 139 MMOL/L (ref 136–145)
TIBC SERPL-MCNC: 373 MCG/DL (ref 298–536)
TRANSFERRIN SERPL-MCNC: 250 MG/DL (ref 200–360)
VIT B12 BLD-MCNC: 750 PG/ML (ref 211–946)
WBC NRBC COR # BLD AUTO: 3.69 10*3/MM3 (ref 3.4–10.8)

## 2024-02-20 PROCEDURE — 94664 DEMO&/EVAL PT USE INHALER: CPT

## 2024-02-20 PROCEDURE — 82948 REAGENT STRIP/BLOOD GLUCOSE: CPT

## 2024-02-20 PROCEDURE — 84466 ASSAY OF TRANSFERRIN: CPT | Performed by: PHYSICIAN ASSISTANT

## 2024-02-20 PROCEDURE — 25010000002 OCTREOTIDE PER 25 MCG: Performed by: INTERNAL MEDICINE

## 2024-02-20 PROCEDURE — 99233 SBSQ HOSP IP/OBS HIGH 50: CPT | Performed by: FAMILY MEDICINE

## 2024-02-20 PROCEDURE — 85014 HEMATOCRIT: CPT | Performed by: FAMILY MEDICINE

## 2024-02-20 PROCEDURE — 43255 EGD CONTROL BLEEDING ANY: CPT | Performed by: INTERNAL MEDICINE

## 2024-02-20 PROCEDURE — 63710000001 INSULIN REGULAR HUMAN PER 5 UNITS

## 2024-02-20 PROCEDURE — 85018 HEMOGLOBIN: CPT | Performed by: INTERNAL MEDICINE

## 2024-02-20 PROCEDURE — 84100 ASSAY OF PHOSPHORUS: CPT | Performed by: STUDENT IN AN ORGANIZED HEALTH CARE EDUCATION/TRAINING PROGRAM

## 2024-02-20 PROCEDURE — 85025 COMPLETE CBC W/AUTO DIFF WBC: CPT | Performed by: STUDENT IN AN ORGANIZED HEALTH CARE EDUCATION/TRAINING PROGRAM

## 2024-02-20 PROCEDURE — 94799 UNLISTED PULMONARY SVC/PX: CPT

## 2024-02-20 PROCEDURE — 99222 1ST HOSP IP/OBS MODERATE 55: CPT | Performed by: INTERNAL MEDICINE

## 2024-02-20 PROCEDURE — 83540 ASSAY OF IRON: CPT | Performed by: PHYSICIAN ASSISTANT

## 2024-02-20 PROCEDURE — 83735 ASSAY OF MAGNESIUM: CPT | Performed by: STUDENT IN AN ORGANIZED HEALTH CARE EDUCATION/TRAINING PROGRAM

## 2024-02-20 PROCEDURE — 25010000002 MORPHINE PER 10 MG: Performed by: FAMILY MEDICINE

## 2024-02-20 PROCEDURE — 0W3P8ZZ CONTROL BLEEDING IN GASTROINTESTINAL TRACT, VIA NATURAL OR ARTIFICIAL OPENING ENDOSCOPIC: ICD-10-PCS | Performed by: INTERNAL MEDICINE

## 2024-02-20 PROCEDURE — 85014 HEMATOCRIT: CPT | Performed by: INTERNAL MEDICINE

## 2024-02-20 PROCEDURE — 25810000003 LACTATED RINGERS PER 1000 ML: Performed by: NURSE ANESTHETIST, CERTIFIED REGISTERED

## 2024-02-20 PROCEDURE — 85018 HEMOGLOBIN: CPT | Performed by: FAMILY MEDICINE

## 2024-02-20 PROCEDURE — 80053 COMPREHEN METABOLIC PANEL: CPT | Performed by: STUDENT IN AN ORGANIZED HEALTH CARE EDUCATION/TRAINING PROGRAM

## 2024-02-20 PROCEDURE — 25010000002 ONDANSETRON PER 1 MG: Performed by: STUDENT IN AN ORGANIZED HEALTH CARE EDUCATION/TRAINING PROGRAM

## 2024-02-20 PROCEDURE — 25010000002 MORPHINE PER 10 MG: Performed by: STUDENT IN AN ORGANIZED HEALTH CARE EDUCATION/TRAINING PROGRAM

## 2024-02-20 PROCEDURE — 25010000002 PROPOFOL 10 MG/ML EMULSION: Performed by: NURSE ANESTHETIST, CERTIFIED REGISTERED

## 2024-02-20 RX ORDER — ROPINIROLE 1 MG/1
1 TABLET, FILM COATED ORAL NIGHTLY
Status: DISCONTINUED | OUTPATIENT
Start: 2024-02-20 | End: 2024-02-22 | Stop reason: HOSPADM

## 2024-02-20 RX ORDER — LIDOCAINE HYDROCHLORIDE 20 MG/ML
INJECTION, SOLUTION EPIDURAL; INFILTRATION; INTRACAUDAL; PERINEURAL AS NEEDED
Status: DISCONTINUED | OUTPATIENT
Start: 2024-02-20 | End: 2024-02-20 | Stop reason: SURG

## 2024-02-20 RX ORDER — HYDROCODONE BITARTRATE AND ACETAMINOPHEN 5; 325 MG/1; MG/1
1 TABLET ORAL EVERY 6 HOURS PRN
Status: DISCONTINUED | OUTPATIENT
Start: 2024-02-20 | End: 2024-02-22 | Stop reason: HOSPADM

## 2024-02-20 RX ORDER — FLUTICASONE PROPIONATE 50 MCG
2 SPRAY, SUSPENSION (ML) NASAL DAILY
Status: DISCONTINUED | OUTPATIENT
Start: 2024-02-20 | End: 2024-02-22 | Stop reason: HOSPADM

## 2024-02-20 RX ORDER — SUCRALFATE 1 G/1
1 TABLET ORAL
Status: DISCONTINUED | OUTPATIENT
Start: 2024-02-20 | End: 2024-02-22 | Stop reason: HOSPADM

## 2024-02-20 RX ORDER — MORPHINE SULFATE 2 MG/ML
1 INJECTION, SOLUTION INTRAMUSCULAR; INTRAVENOUS EVERY 4 HOURS PRN
Status: DISCONTINUED | OUTPATIENT
Start: 2024-02-20 | End: 2024-02-20

## 2024-02-20 RX ORDER — OMEPRAZOLE 40 MG/1
40 CAPSULE, DELAYED RELEASE ORAL DAILY
COMMUNITY
End: 2024-02-22 | Stop reason: HOSPADM

## 2024-02-20 RX ORDER — HYDROCODONE BITARTRATE AND ACETAMINOPHEN 10; 325 MG/1; MG/1
1 TABLET ORAL EVERY 6 HOURS PRN
Status: DISCONTINUED | OUTPATIENT
Start: 2024-02-20 | End: 2024-02-22 | Stop reason: HOSPADM

## 2024-02-20 RX ORDER — PANTOPRAZOLE SODIUM 40 MG/10ML
40 INJECTION, POWDER, LYOPHILIZED, FOR SOLUTION INTRAVENOUS EVERY 12 HOURS SCHEDULED
Status: DISCONTINUED | OUTPATIENT
Start: 2024-02-20 | End: 2024-02-22 | Stop reason: HOSPADM

## 2024-02-20 RX ORDER — BUDESONIDE 0.5 MG/2ML
0.5 INHALANT ORAL
Status: DISCONTINUED | OUTPATIENT
Start: 2024-02-20 | End: 2024-02-22 | Stop reason: HOSPADM

## 2024-02-20 RX ORDER — SERTRALINE HYDROCHLORIDE 100 MG/1
100 TABLET, FILM COATED ORAL NIGHTLY
Status: DISCONTINUED | OUTPATIENT
Start: 2024-02-20 | End: 2024-02-22 | Stop reason: HOSPADM

## 2024-02-20 RX ORDER — OCTREOTIDE ACETATE 100 UG/ML
200 INJECTION, SOLUTION INTRAVENOUS; SUBCUTANEOUS 3 TIMES DAILY
Status: COMPLETED | OUTPATIENT
Start: 2024-02-20 | End: 2024-02-20

## 2024-02-20 RX ORDER — BUSPIRONE HYDROCHLORIDE 7.5 MG/1
7.5 TABLET ORAL 3 TIMES DAILY
Status: DISCONTINUED | OUTPATIENT
Start: 2024-02-20 | End: 2024-02-22 | Stop reason: HOSPADM

## 2024-02-20 RX ORDER — FUROSEMIDE 20 MG/1
20 TABLET ORAL DAILY
Status: DISCONTINUED | OUTPATIENT
Start: 2024-02-20 | End: 2024-02-22 | Stop reason: HOSPADM

## 2024-02-20 RX ORDER — MORPHINE SULFATE 2 MG/ML
2 INJECTION, SOLUTION INTRAMUSCULAR; INTRAVENOUS EVERY 6 HOURS PRN
Status: DISCONTINUED | OUTPATIENT
Start: 2024-02-20 | End: 2024-02-22 | Stop reason: HOSPADM

## 2024-02-20 RX ORDER — SPIRONOLACTONE 25 MG/1
100 TABLET ORAL 2 TIMES DAILY
Status: DISCONTINUED | OUTPATIENT
Start: 2024-02-20 | End: 2024-02-22 | Stop reason: HOSPADM

## 2024-02-20 RX ORDER — OXYCODONE HYDROCHLORIDE 10 MG/1
10 TABLET ORAL EVERY 4 HOURS PRN
Status: ON HOLD | COMMUNITY
Start: 2024-02-18 | End: 2024-02-22 | Stop reason: SDUPTHER

## 2024-02-20 RX ORDER — ALBUTEROL SULFATE 2.5 MG/3ML
2.5 SOLUTION RESPIRATORY (INHALATION) EVERY 6 HOURS PRN
Status: DISCONTINUED | OUTPATIENT
Start: 2024-02-20 | End: 2024-02-22 | Stop reason: HOSPADM

## 2024-02-20 RX ORDER — IPRATROPIUM BROMIDE AND ALBUTEROL SULFATE 2.5; .5 MG/3ML; MG/3ML
3 SOLUTION RESPIRATORY (INHALATION) EVERY 4 HOURS PRN
Status: DISCONTINUED | OUTPATIENT
Start: 2024-02-20 | End: 2024-02-22 | Stop reason: HOSPADM

## 2024-02-20 RX ORDER — LACTULOSE 10 G/15ML
10 SOLUTION ORAL 2 TIMES DAILY
Status: DISCONTINUED | OUTPATIENT
Start: 2024-02-20 | End: 2024-02-22 | Stop reason: HOSPADM

## 2024-02-20 RX ORDER — SODIUM CHLORIDE, SODIUM LACTATE, POTASSIUM CHLORIDE, CALCIUM CHLORIDE 600; 310; 30; 20 MG/100ML; MG/100ML; MG/100ML; MG/100ML
INJECTION, SOLUTION INTRAVENOUS CONTINUOUS PRN
Status: DISCONTINUED | OUTPATIENT
Start: 2024-02-20 | End: 2024-02-20 | Stop reason: SURG

## 2024-02-20 RX ORDER — PROPOFOL 10 MG/ML
VIAL (ML) INTRAVENOUS AS NEEDED
Status: DISCONTINUED | OUTPATIENT
Start: 2024-02-20 | End: 2024-02-20 | Stop reason: SURG

## 2024-02-20 RX ADMIN — PROPOFOL 150 MCG/KG/MIN: 10 INJECTION, EMULSION INTRAVENOUS at 11:07

## 2024-02-20 RX ADMIN — MORPHINE SULFATE 1 MG: 2 INJECTION, SOLUTION INTRAMUSCULAR; INTRAVENOUS at 12:40

## 2024-02-20 RX ADMIN — PROPOFOL 180 MCG/KG/MIN: 10 INJECTION, EMULSION INTRAVENOUS at 11:19

## 2024-02-20 RX ADMIN — OCTREOTIDE ACETATE 200 MCG: 100 INJECTION, SOLUTION INTRAVENOUS; SUBCUTANEOUS at 22:10

## 2024-02-20 RX ADMIN — MORPHINE SULFATE 1 MG: 2 INJECTION, SOLUTION INTRAMUSCULAR; INTRAVENOUS at 01:55

## 2024-02-20 RX ADMIN — RIFAXIMIN 550 MG: 550 TABLET ORAL at 21:50

## 2024-02-20 RX ADMIN — HYDROCODONE BITARTRATE AND ACETAMINOPHEN 1 TABLET: 10; 325 TABLET ORAL at 16:12

## 2024-02-20 RX ADMIN — MORPHINE SULFATE 1 MG: 2 INJECTION, SOLUTION INTRAMUSCULAR; INTRAVENOUS at 05:56

## 2024-02-20 RX ADMIN — ONDANSETRON 4 MG: 2 INJECTION INTRAMUSCULAR; INTRAVENOUS at 09:49

## 2024-02-20 RX ADMIN — BUDESONIDE 0.5 MG: 0.5 INHALANT ORAL at 07:17

## 2024-02-20 RX ADMIN — MORPHINE SULFATE 2 MG: 2 INJECTION, SOLUTION INTRAMUSCULAR; INTRAVENOUS at 18:05

## 2024-02-20 RX ADMIN — BUDESONIDE 0.5 MG: 0.5 INHALANT ORAL at 19:48

## 2024-02-20 RX ADMIN — PANTOPRAZOLE SODIUM 40 MG: 40 INJECTION, POWDER, FOR SOLUTION INTRAVENOUS at 16:18

## 2024-02-20 RX ADMIN — SODIUM CHLORIDE, POTASSIUM CHLORIDE, SODIUM LACTATE AND CALCIUM CHLORIDE: 600; 310; 30; 20 INJECTION, SOLUTION INTRAVENOUS at 11:01

## 2024-02-20 RX ADMIN — ARFORMOTEROL TARTRATE 15 MCG: 15 SOLUTION RESPIRATORY (INHALATION) at 07:18

## 2024-02-20 RX ADMIN — ARFORMOTEROL TARTRATE 15 MCG: 15 SOLUTION RESPIRATORY (INHALATION) at 19:47

## 2024-02-20 RX ADMIN — ROPINIROLE HYDROCHLORIDE 1 MG: 1 TABLET, FILM COATED ORAL at 21:50

## 2024-02-20 RX ADMIN — INSULIN HUMAN 3 UNITS: 100 INJECTION, SOLUTION PARENTERAL at 22:10

## 2024-02-20 RX ADMIN — RIFAXIMIN 550 MG: 550 TABLET ORAL at 14:14

## 2024-02-20 RX ADMIN — BUSPIRONE HYDROCHLORIDE 7.5 MG: 7.5 TABLET ORAL at 16:12

## 2024-02-20 RX ADMIN — Medication 10 ML: at 08:24

## 2024-02-20 RX ADMIN — OCTREOTIDE ACETATE 200 MCG: 100 INJECTION, SOLUTION INTRAVENOUS; SUBCUTANEOUS at 16:12

## 2024-02-20 RX ADMIN — LIDOCAINE HYDROCHLORIDE 100 MG: 20 INJECTION, SOLUTION EPIDURAL; INFILTRATION; INTRACAUDAL; PERINEURAL at 11:07

## 2024-02-20 RX ADMIN — HYDROCODONE BITARTRATE AND ACETAMINOPHEN 1 TABLET: 10; 325 TABLET ORAL at 22:10

## 2024-02-20 RX ADMIN — SERTRALINE HYDROCHLORIDE 100 MG: 100 TABLET ORAL at 21:50

## 2024-02-20 RX ADMIN — SUCRALFATE 1 G: 1 TABLET ORAL at 16:11

## 2024-02-20 RX ADMIN — SODIUM CHLORIDE 8 MG/HR: 900 INJECTION INTRAVENOUS at 05:26

## 2024-02-20 RX ADMIN — SUCRALFATE 1 G: 1 TABLET ORAL at 21:50

## 2024-02-20 RX ADMIN — BUSPIRONE HYDROCHLORIDE 7.5 MG: 7.5 TABLET ORAL at 21:50

## 2024-02-20 RX ADMIN — LACTULOSE 10 G: 20 SOLUTION ORAL at 21:50

## 2024-02-20 RX ADMIN — FLUTICASONE PROPIONATE 2 SPRAY: 50 SPRAY, METERED NASAL at 14:14

## 2024-02-20 NOTE — ANESTHESIA POSTPROCEDURE EVALUATION
Patient: Nic Nicolas    Procedure Summary       Date: 02/20/24 Room / Location: [facility] ENDOSCOPY 5 / [facility] ENDOSCOPY    Anesthesia Start: 1101 Anesthesia Stop: 1139    Procedure: ESOPHAGOGASTRODUODENOSCOPY WITH STRAIGHT FIRE APPLICATION OF APC Diagnosis:       History of bleeding ulcers      Gastrointestinal hemorrhage associated with peptic ulcer      Iron deficiency anemia due to chronic blood loss      (History of bleeding ulcers [Z87.11])      (Gastrointestinal hemorrhage associated with peptic ulcer [K27.4])      (Iron deficiency anemia due to chronic blood loss [D50.0])    Surgeons: Andrea Jansen MD Provider: Celena Mansfield CRNA    Anesthesia Type: general ASA Status: 4 - Emergent            Anesthesia Type: general    Vitals  Vitals Value Taken Time   /60 02/20/24 1140   Temp 36.3 °C (97.3 °F) 02/20/24 1135   Pulse 76 02/20/24 1145   Resp 16 02/20/24 1140   SpO2 98 % 02/20/24 1145   Vitals shown include unfiled device data.        Post Anesthesia Care and Evaluation    Patient location during evaluation: PHASE II  Post-procedure mental status: acceptable.  Pain management: satisfactory to patient    Airway patency: patent  Anesthetic complications: No anesthetic complications    Cardiovascular status: acceptable  Respiratory status: acceptable    Comments: Per chart review

## 2024-02-20 NOTE — INTERVAL H&P NOTE
The risks, benefits, alternatives of the procedures were discussed with the patient at length.  All questions were answered appropriately.  Patient voices understanding and agreed to proceed with the procedure under MAC.    H&P reviewed. The patient was examined and there are no changes to the H&P.
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
## 2024-02-20 NOTE — NURSING NOTE
Patient blood finished flushing through line at 1023. Patient vital signs stable and patient asymptomatic. Unable to chart this on blood documentation., see vital signs report total volume of 524 ml

## 2024-02-20 NOTE — NURSING NOTE
1 unit of blood completed at 1023, patient vital signs stable, blood and tubing left at patient bedside. Total amount infused 524ml. See vital signs documentation

## 2024-02-20 NOTE — PLAN OF CARE
Goal Outcome Evaluation:      Admitted this shift with anemia, one unit of blood given, received orders for prn pain medicine, currently on room air, no other complaints or significant changes at this time.

## 2024-02-20 NOTE — PROGRESS NOTES
[facility]   Hospitalist Progress Note  Date: 2024  Patient Name: Nic Nicolas  : 1965  MRN: [id number]  Date of admission: 2024      Subjective   Subjective     Chief Complaint: Anemia    Summary: Patient is 58-year-old male past medical history significant for liver cirrhosis secondary to Ruiz, hypertension, diabetes on insulin, recent admission for upper GI bleed secondary to gastric ulcer gastritis and duodenitis.  Patient presents to the ED for evaluation for worsening anemia noted on routine labs at nursing home.  In the emergency department patient was evaluated was noted to have worsening anemia with a hemoglobin of 6.9 has been admitted to the hospitalist service GI consulted started on Protonix drip started on octreotide drip transfused packed red blood cells underwent EGD showed chronic erosive gastritis with oozing of blood treated with APC showed duodenitis with oozing blood of the mucosa that stopped without any intervention showed healed scar of gastric cardia ulcer.  Been transition to twice daily Protonix 3 times daily octreotide diet being advanced.  Also checking iron profile B12 and folate levels will replace as needed.    Interval Followup: Patient seen and examined resting comfortably EGD report noted discontinuing Protonix drip and octreotide drip starting twice daily Protonix and octreotide 200 mcg subcutaneously 3 times a day and Carafate 4 times daily will advance diet to full liquid monitor for worsening anemia.  Will transfuse another unit of packed red blood cells.  Check iron profile replace iron intravenously pending results check B12 and folate levels.    Review of systems: All systems reviewed and negative except the following: Patient complains of generalized weakness fatigue  Objective   Objective     Vitals:   Temp:  [97.2 °F (36.2 °C)-100.2 °F (37.9 °C)] 97.2 °F (36.2 °C)  Heart Rate:  [69-96] 82  Resp:  [14-24] 17  BP: ()/(55-80) 98/62  Flow  (L/min):  [3] 3    Physical Exam   Constitutional: Awake alert oriented no acute distress  Respiratory: Clear breath sounds noted bilaterally  Cardiovascular: RRR  GI: Abdomen soft mild epigastric tenderness bowel sounds present    Result Review    Result Review:  I have personally reviewed the results from the time of this admission to 2/20/2024 12:43 EST and agree with these findings:  []  Laboratory  []  Microbiology  []  Radiology  []  EKG/Telemetry   []  Cardiology/Vascular   []  Pathology  []  Old records  []  Other:    Assessment & Plan   Assessment / Plan   Assessment:    Acute blood loss anemia  Recurrent upper GI bleed  Chronic erosive gastritis with oozing blood noted treated with APC  Portal hypertensive gastropathy  History of duodenitis gastritis  Thrombocytopenia secondary to liver cirrhosis  Liver cirrhosis secondary to Ruiz  Mild to moderate ascites  History of asthma  Anxiety  Depression    Plan:    Type cross transfuse additional unit of packed red blood cell  Check iron profile replace intravenously as indicated  Check B12 and folate levels  EGD completed report reviewed DC Protonix drip DC octreotide drip  Starting Protonix 40 mg IV twice daily and octreotide 200 mcg subcutaneously 3 times daily  Carafate 1 g 4 times daily  Advance diet to fulls  Resuming appropriate home medications  Holding all blood thinners  Monitoring H&H and platelet count  Continuing rifaximin and lactulose  Routine a.m. labs  GI consulted recommendations appreciated EGD report reviewed  Further treatment contingent upon his hospital course  Discussed plan with RN.    DVT prophylaxis:  Mechanical DVT prophylaxis orders are present.        CODE STATUS:   Level Of Support Discussed With: Patient  Code Status (Patient has no pulse and is not breathing): CPR (Attempt to Resuscitate)  Medical Interventions (Patient has pulse or is breathing): Full Support        Electronically signed by SOLEDAD Oliva, 02/20/24, 12:43 PM  EST.      Attending documentation:  I reviewed the above documentation and independently reviewed and rounded and evaluated the patient and discussed the care plan with TERESA Woodard PA-C, I agree with his findings and plan as documented, what I have added to the care plan and modified is as follows in my documentation and my medical decision making; 58-year-old male hospitalized on 2/19/2024 for chief complaint and anemia, black tarry stools.  Abdominal pain.  GI consulted.  Underwent EGD after requiring 2 units of PRBC transfusions after hemoglobin failed to improve.  He had erosive gastritis with oozing of the blood, treated with argon plasma coagulation.  He also had duodenitis using a blood was also seen and stopped during procedure.  He was transition from continuous octreotide infusion to 3 times daily basis, as well as Protonix transition to IV after procedure.  Interval follow-up: Seen and examined prior to EGD scopes, no acute distress, continued black tarry stools, hemoglobin despite blood transfusion still 6.9, additional PRBC ordered for transfusion.  Underwent EGD, reviewed findings as listed above.  He continued to complain of significant abdominal pain and discomfort.  No nausea or vomiting.  Review of systems obtained and all systems reviewed and negative except weakness, fatigue, abdominal pain.  Vitals reviewed, labs reviewed, BUN 21, creatinine 0.9, potassium 4.2, hemoglobin 6.9, platelets 70,000.  On physical exam, elderly appearing male older than stated age, laying in bed, with generalized fatigue, regular rate and rhythm, clear to auscultation bilaterally, soft distended abdomen with tenderness in epigastric region.  Alert and oriented x 3.  Assessment as above, symptomatic anemia with acute GI bleed due to upper GI bleeding due to erosive gastritis and duodenitis.  Continue every 6 hour hemoglobin monitoring, transition octreotide to 3 times daily, continue Protonix switch to twice daily,  additional unit of PRBC ordered for transfusion today, if hemoglobin does not improve to greater than 8, continue transfusing PRBC until it does, GI consulted discussed with Dr. Jansen, recommendations appreciated, start Carafate, advance diet per tolerance, continue rifaximin and lactulose, continue telemetry monitoring, a.m. labs, full code, DVT prophylax with SCDs in the setting of GI bleed, clinical course dictate further management of this critically ill male with acute GI bleed with hemodynamic instability with hypotension and tachycardia.  Discussed with nurse at the bedside.  More than 85% of the time of this patient's encounter was performed by me, this included face-to-face time, planning and coordinating, medical decision making and critical thinking personally done by me.    Electronically signed by Bandar Burch MD, 02/20/24, 7:45 PM EST.  Portions of this documentation were transcribed electronically from a voice recognition software.  I confirm all data accurately represents the service(s) I performed at today's visit.

## 2024-02-20 NOTE — PLAN OF CARE
Goal Outcome Evaluation:   Two units of PRBC administered this AM. HGB improved to 8.0. Holding third unit per MD. Patient went for EGD today. Erosive gastritis and duodenum inflammation noted. Patient has chronic back pain and requested increase in pain meds due to breakthrough pain. Pain meds adjusted per MD. See MAR. Will continue with POC
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
## 2024-02-20 NOTE — ANESTHESIA PREPROCEDURE EVALUATION
Anesthesia Evaluation     Patient summary reviewed and Nursing notes reviewed   NPO Solid Status: > 8 hours  NPO Liquid Status: > 8 hours           Airway   Mallampati: II  TM distance: >3 FB  Neck ROM: full  Large neck circumference and No difficulty expected  Dental    (+) poor dentition        Pulmonary     breath sounds clear to auscultation  (+) asthma,sleep apnea  Cardiovascular - normal exam  Exercise tolerance: poor (<4 METS)    Rhythm: regular  Rate: normal    (+) hypertension, CHF , PVD (LLE > RLE), DVT resolved, hyperlipidemia      Neuro/Psych  (+) CVA, psychiatric history Anxiety and Depression  GI/Hepatic/Renal/Endo    (+) obesity, morbid obesity, GERD well controlled, PUD, GI bleeding , hepatitis, liver disease cirrhosis, renal disease-, diabetes mellitus type 2    Musculoskeletal     (+) back pain, chronic pain  Abdominal   (+) obese    Abdomen: soft.   Substance History      OB/GYN          Other   arthritis,     ROS/Med Hx Other: Takes percocet for chronic pain     Labs:   02/20/24 05:22  WBC: 3.69  RBC: 2.97 (L)  Hemoglobin: 6.9 (C)  Hematocrit: 23.4 (L)  Platelets: 78 (L)  RDW: 22.2 (H)  MCV: 78.8 (L)  MCH: 23.2 (L)  MCHC: 29.5 (L)  MPV: See Comment  RDW-SD: 63.2 (H)    Receiving 2nd unit of PRBC's since 2/19- 3rd unit ordered       Phys Exam Other: Jaundice in color  97% room air  Protonix gtt currently   Receiving 2 unit PRBC currently  Has 2 IV access in left arm  Zofran 4 mg IV given in preop for nausea                Anesthesia Plan    ASA 4 - emergent     general   total IV anesthesia  (Total IV Anesthesia    Patient understands anesthesia not responsible for dental damage.  )  intravenous induction     Anesthetic plan, risks, benefits, and alternatives have been provided, discussed and informed consent has been obtained with: patient.  Pre-procedure education provided  Plan discussed with CRNA.      CODE STATUS:    Level Of Support Discussed With: Patient  Code Status (Patient has no pulse  and is not breathing): CPR (Attempt to Resuscitate)  Medical Interventions (Patient has pulse or is breathing): Full Support
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
## 2024-02-21 LAB
ALBUMIN SERPL-MCNC: 2.7 G/DL (ref 3.5–5.2)
ALP SERPL-CCNC: 85 U/L (ref 39–117)
ALT SERPL W P-5'-P-CCNC: 16 U/L (ref 1–41)
ANION GAP SERPL CALCULATED.3IONS-SCNC: 9.4 MMOL/L (ref 5–15)
AST SERPL-CCNC: 31 U/L (ref 1–40)
BASOPHILS # BLD AUTO: 0.03 10*3/MM3 (ref 0–0.2)
BASOPHILS NFR BLD AUTO: 0.7 % (ref 0–1.5)
BH BB BLOOD EXPIRATION DATE: NORMAL
BH BB BLOOD EXPIRATION DATE: NORMAL
BH BB BLOOD TYPE BARCODE: 6200
BH BB BLOOD TYPE BARCODE: 6200
BH BB DISPENSE STATUS: NORMAL
BH BB DISPENSE STATUS: NORMAL
BH BB PRODUCT CODE: NORMAL
BH BB PRODUCT CODE: NORMAL
BH BB UNIT NUMBER: NORMAL
BH BB UNIT NUMBER: NORMAL
BILIRUB CONJ SERPL-MCNC: 0.7 MG/DL (ref 0–0.3)
BILIRUB INDIRECT SERPL-MCNC: 1.3 MG/DL
BILIRUB SERPL-MCNC: 2 MG/DL (ref 0–1.2)
BUN SERPL-MCNC: 20 MG/DL (ref 6–20)
BUN/CREAT SERPL: 20.8 (ref 7–25)
CALCIUM SPEC-SCNC: 8.2 MG/DL (ref 8.6–10.5)
CHLORIDE SERPL-SCNC: 105 MMOL/L (ref 98–107)
CO2 SERPL-SCNC: 23.6 MMOL/L (ref 22–29)
CREAT SERPL-MCNC: 0.96 MG/DL (ref 0.76–1.27)
CROSSMATCH INTERPRETATION: NORMAL
CROSSMATCH INTERPRETATION: NORMAL
DEPRECATED RDW RBC AUTO: 61.2 FL (ref 37–54)
EGFRCR SERPLBLD CKD-EPI 2021: 92.2 ML/MIN/1.73
EOSINOPHIL # BLD AUTO: 0.07 10*3/MM3 (ref 0–0.4)
EOSINOPHIL NFR BLD AUTO: 1.5 % (ref 0.3–6.2)
ERYTHROCYTE [DISTWIDTH] IN BLOOD BY AUTOMATED COUNT: 21.3 % (ref 12.3–15.4)
GLUCOSE BLDC GLUCOMTR-MCNC: 173 MG/DL (ref 70–99)
GLUCOSE BLDC GLUCOMTR-MCNC: 177 MG/DL (ref 70–99)
GLUCOSE BLDC GLUCOMTR-MCNC: 186 MG/DL (ref 70–99)
GLUCOSE BLDC GLUCOMTR-MCNC: 210 MG/DL (ref 70–99)
GLUCOSE SERPL-MCNC: 152 MG/DL (ref 65–99)
HCT VFR BLD AUTO: 24.7 % (ref 37.5–51)
HCT VFR BLD AUTO: 24.9 % (ref 37.5–51)
HCT VFR BLD AUTO: 25.2 % (ref 37.5–51)
HCT VFR BLD AUTO: 27.5 % (ref 37.5–51)
HGB BLD-MCNC: 7.6 G/DL (ref 13–17.7)
HGB BLD-MCNC: 7.7 G/DL (ref 13–17.7)
HGB BLD-MCNC: 7.7 G/DL (ref 13–17.7)
HGB BLD-MCNC: 8 G/DL (ref 13–17.7)
IMM GRANULOCYTES # BLD AUTO: 0.02 10*3/MM3 (ref 0–0.05)
IMM GRANULOCYTES NFR BLD AUTO: 0.4 % (ref 0–0.5)
LYMPHOCYTES # BLD AUTO: 0.76 10*3/MM3 (ref 0.7–3.1)
LYMPHOCYTES NFR BLD AUTO: 16.8 % (ref 19.6–45.3)
MAGNESIUM SERPL-MCNC: 1.7 MG/DL (ref 1.6–2.6)
MCH RBC QN AUTO: 23.8 PG (ref 26.6–33)
MCHC RBC AUTO-ENTMCNC: 30.2 G/DL (ref 31.5–35.7)
MCV RBC AUTO: 78.8 FL (ref 79–97)
MONOCYTES # BLD AUTO: 0.52 10*3/MM3 (ref 0.1–0.9)
MONOCYTES NFR BLD AUTO: 11.5 % (ref 5–12)
NEUTROPHILS NFR BLD AUTO: 3.12 10*3/MM3 (ref 1.7–7)
NEUTROPHILS NFR BLD AUTO: 69.1 % (ref 42.7–76)
NRBC BLD AUTO-RTO: 0 /100 WBC (ref 0–0.2)
PHOSPHATE SERPL-MCNC: 3 MG/DL (ref 2.5–4.5)
PLATELET # BLD AUTO: 80 10*3/MM3 (ref 140–450)
PMV BLD AUTO: ABNORMAL FL
POTASSIUM SERPL-SCNC: 4.3 MMOL/L (ref 3.5–5.2)
PROT SERPL-MCNC: 5 G/DL (ref 6–8.5)
RBC # BLD AUTO: 3.2 10*6/MM3 (ref 4.14–5.8)
SODIUM SERPL-SCNC: 138 MMOL/L (ref 136–145)
UNIT  ABO: NORMAL
UNIT  ABO: NORMAL
UNIT  RH: NORMAL
UNIT  RH: NORMAL
WBC NRBC COR # BLD AUTO: 4.52 10*3/MM3 (ref 3.4–10.8)

## 2024-02-21 PROCEDURE — 94799 UNLISTED PULMONARY SVC/PX: CPT

## 2024-02-21 PROCEDURE — 82948 REAGENT STRIP/BLOOD GLUCOSE: CPT | Performed by: INTERNAL MEDICINE

## 2024-02-21 PROCEDURE — 94664 DEMO&/EVAL PT USE INHALER: CPT

## 2024-02-21 PROCEDURE — 85018 HEMOGLOBIN: CPT | Performed by: INTERNAL MEDICINE

## 2024-02-21 PROCEDURE — 80076 HEPATIC FUNCTION PANEL: CPT | Performed by: INTERNAL MEDICINE

## 2024-02-21 PROCEDURE — 85014 HEMATOCRIT: CPT | Performed by: INTERNAL MEDICINE

## 2024-02-21 PROCEDURE — 99232 SBSQ HOSP IP/OBS MODERATE 35: CPT | Performed by: INTERNAL MEDICINE

## 2024-02-21 PROCEDURE — 80048 BASIC METABOLIC PNL TOTAL CA: CPT | Performed by: INTERNAL MEDICINE

## 2024-02-21 PROCEDURE — 82948 REAGENT STRIP/BLOOD GLUCOSE: CPT

## 2024-02-21 PROCEDURE — 97161 PT EVAL LOW COMPLEX 20 MIN: CPT

## 2024-02-21 PROCEDURE — 94761 N-INVAS EAR/PLS OXIMETRY MLT: CPT

## 2024-02-21 PROCEDURE — 63710000001 INSULIN REGULAR HUMAN PER 5 UNITS

## 2024-02-21 PROCEDURE — 94760 N-INVAS EAR/PLS OXIMETRY 1: CPT

## 2024-02-21 PROCEDURE — 99232 SBSQ HOSP IP/OBS MODERATE 35: CPT | Performed by: FAMILY MEDICINE

## 2024-02-21 PROCEDURE — 84100 ASSAY OF PHOSPHORUS: CPT | Performed by: INTERNAL MEDICINE

## 2024-02-21 PROCEDURE — 25010000002 ONDANSETRON PER 1 MG: Performed by: INTERNAL MEDICINE

## 2024-02-21 PROCEDURE — 85018 HEMOGLOBIN: CPT | Performed by: FAMILY MEDICINE

## 2024-02-21 PROCEDURE — 85014 HEMATOCRIT: CPT | Performed by: FAMILY MEDICINE

## 2024-02-21 PROCEDURE — 85025 COMPLETE CBC W/AUTO DIFF WBC: CPT | Performed by: INTERNAL MEDICINE

## 2024-02-21 PROCEDURE — 25010000002 MORPHINE PER 10 MG: Performed by: FAMILY MEDICINE

## 2024-02-21 PROCEDURE — 83735 ASSAY OF MAGNESIUM: CPT | Performed by: INTERNAL MEDICINE

## 2024-02-21 RX ORDER — CARVEDILOL 3.12 MG/1
3.12 TABLET ORAL 2 TIMES DAILY WITH MEALS
Status: DISCONTINUED | OUTPATIENT
Start: 2024-02-21 | End: 2024-02-22

## 2024-02-21 RX ADMIN — RIFAXIMIN 550 MG: 550 TABLET ORAL at 22:40

## 2024-02-21 RX ADMIN — INSULIN HUMAN 2 UNITS: 100 INJECTION, SOLUTION PARENTERAL at 11:49

## 2024-02-21 RX ADMIN — SUCRALFATE 1 G: 1 TABLET ORAL at 16:52

## 2024-02-21 RX ADMIN — SUCRALFATE 1 G: 1 TABLET ORAL at 08:38

## 2024-02-21 RX ADMIN — CARVEDILOL 3.12 MG: 3.12 TABLET, FILM COATED ORAL at 17:22

## 2024-02-21 RX ADMIN — PANTOPRAZOLE SODIUM 40 MG: 40 INJECTION, POWDER, FOR SOLUTION INTRAVENOUS at 22:40

## 2024-02-21 RX ADMIN — ARFORMOTEROL TARTRATE 15 MCG: 15 SOLUTION RESPIRATORY (INHALATION) at 19:31

## 2024-02-21 RX ADMIN — LACTULOSE 10 G: 20 SOLUTION ORAL at 08:39

## 2024-02-21 RX ADMIN — BUSPIRONE HYDROCHLORIDE 7.5 MG: 7.5 TABLET ORAL at 08:38

## 2024-02-21 RX ADMIN — BUSPIRONE HYDROCHLORIDE 7.5 MG: 7.5 TABLET ORAL at 16:44

## 2024-02-21 RX ADMIN — Medication 10 ML: at 22:40

## 2024-02-21 RX ADMIN — BUDESONIDE 0.5 MG: 0.5 INHALANT ORAL at 08:29

## 2024-02-21 RX ADMIN — HYDROCODONE BITARTRATE AND ACETAMINOPHEN 1 TABLET: 10; 325 TABLET ORAL at 16:44

## 2024-02-21 RX ADMIN — INSULIN HUMAN 2 UNITS: 100 INJECTION, SOLUTION PARENTERAL at 22:40

## 2024-02-21 RX ADMIN — MORPHINE SULFATE 2 MG: 2 INJECTION, SOLUTION INTRAMUSCULAR; INTRAVENOUS at 11:48

## 2024-02-21 RX ADMIN — HYDROCODONE BITARTRATE AND ACETAMINOPHEN 1 TABLET: 5; 325 TABLET ORAL at 10:22

## 2024-02-21 RX ADMIN — SUCRALFATE 1 G: 1 TABLET ORAL at 22:40

## 2024-02-21 RX ADMIN — INSULIN HUMAN 2 UNITS: 100 INJECTION, SOLUTION PARENTERAL at 08:38

## 2024-02-21 RX ADMIN — SERTRALINE HYDROCHLORIDE 100 MG: 100 TABLET ORAL at 22:40

## 2024-02-21 RX ADMIN — ROPINIROLE HYDROCHLORIDE 1 MG: 1 TABLET, FILM COATED ORAL at 22:40

## 2024-02-21 RX ADMIN — HYDROCODONE BITARTRATE AND ACETAMINOPHEN 1 TABLET: 10; 325 TABLET ORAL at 22:40

## 2024-02-21 RX ADMIN — BUSPIRONE HYDROCHLORIDE 7.5 MG: 7.5 TABLET ORAL at 22:40

## 2024-02-21 RX ADMIN — ONDANSETRON 4 MG: 2 INJECTION INTRAMUSCULAR; INTRAVENOUS at 11:48

## 2024-02-21 RX ADMIN — BUDESONIDE 0.5 MG: 0.5 INHALANT ORAL at 19:31

## 2024-02-21 RX ADMIN — Medication 10 ML: at 08:38

## 2024-02-21 RX ADMIN — RIFAXIMIN 550 MG: 550 TABLET ORAL at 08:38

## 2024-02-21 RX ADMIN — SUCRALFATE 1 G: 1 TABLET ORAL at 11:49

## 2024-02-21 RX ADMIN — INSULIN HUMAN 2 UNITS: 100 INJECTION, SOLUTION PARENTERAL at 16:48

## 2024-02-21 RX ADMIN — LACTULOSE 10 G: 20 SOLUTION ORAL at 22:40

## 2024-02-21 RX ADMIN — HYDROCODONE BITARTRATE AND ACETAMINOPHEN 1 TABLET: 10; 325 TABLET ORAL at 04:10

## 2024-02-21 RX ADMIN — ARFORMOTEROL TARTRATE 15 MCG: 15 SOLUTION RESPIRATORY (INHALATION) at 08:29

## 2024-02-21 RX ADMIN — FLUTICASONE PROPIONATE 2 SPRAY: 50 SPRAY, METERED NASAL at 08:41

## 2024-02-21 RX ADMIN — PANTOPRAZOLE SODIUM 40 MG: 40 INJECTION, POWDER, FOR SOLUTION INTRAVENOUS at 08:38

## 2024-02-21 NOTE — PROGRESS NOTES
[facility]   Hospitalist Progress Note       Patient Name: Nic Nicolas  : 1965  MRN: [id number]  Primary Care Physician: Sheldon Carcamo MD  Date of admission: 2024  Today's Date: 2024  Room / Bed:   [facility]/1  Subjective   Chief Complaint: Anemia     Summary: Patient is 58-year-old male past medical history significant for liver cirrhosis secondary to Ruiz, hypertension, diabetes on insulin, recent admission for upper GI bleed secondary to gastric ulcer gastritis and duodenitis.  Patient presents to the ED for evaluation for worsening anemia noted on routine labs at nursing home.  In the emergency department patient was evaluated was noted to have worsening anemia with a hemoglobin of 6.9 has been admitted to the hospitalist service GI consulted started on Protonix drip started on octreotide drip transfused packed red blood cells underwent EGD showed chronic erosive gastritis with oozing of blood treated with APC showed duodenitis with oozing blood of the mucosa that stopped without any intervention showed healed scar of gastric cardia ulcer.  Been transition to twice daily Protonix 3 times daily octreotide diet being advanced.  Also checking iron profile B12 and folate levels will replace as needed.    Interval Followup: 2024    Resident of [facility].  Reports feeling better.  Not as fatigued.  Appetite starting to return.  Denies any hematemesis, hemoptysis, epistaxis, melena, or hematochezia.  Denies any abdominal pain currently  //82-1 69/73  Creatinine 0.96  Hemoglobin currently 7.7 (6.9 yesterday; PRBCs given)  Platelets stable at 80      REVIEW OF SYSTEMS:   Fatigue  Objective   Temp:  [97.7 °F (36.5 °C)-98.4 °F (36.9 °C)] 98.1 °F (36.7 °C)  Heart Rate:  [74-82] 74  Resp:  [18] 18  BP: (112-169)/(51-82) 169/73  PHYSICAL EXAM   CON: WN. WD. NAD.   NECK:  No thyromegaly. No stridor.   RESP:  CTA. No wheezes. No crackles.  No work of breathing or tachypnea.    CV:  Rhythm regular. Rate WNL. No murmur noted.  No edema.  GI:  Soft and nontender. Nondistended.    EXT: Peripheral pulses intact.  No cyanosis.  PSYCH:  Alert. Oriented. Normal affect and mood.  NEURO:  No dysarthria or aphasia. No unilateral weakness or paresthesia.  SKIN: Chronic venous stasis changes lower extremities L>R  Results from last 7 days   Lab Units 02/21/24  1147 02/21/24  0518 02/21/24  0029 02/20/24  1817 02/20/24  1253 02/20/24  0522 02/19/24  1628   WBC 10*3/mm3  --  4.52  --   --   --  3.69 4.01   HEMOGLOBIN g/dL 7.7* 7.6* 7.7* 8.2* 8.0* 6.9* 6.9*   HEMATOCRIT % 24.7* 25.2* 24.9* 27.6* 26.7* 23.4* 23.1*   PLATELETS 10*3/mm3  --  80*  --   --   --  78* 92*     Results from last 7 days   Lab Units 02/21/24  0518 02/20/24  0522 02/19/24  1628   SODIUM mmol/L 138 139 140   POTASSIUM mmol/L 4.3 4.2 4.1   CO2 mmol/L 23.6 24.7 25.2   CHLORIDE mmol/L 105 106 104   ANION GAP mmol/L 9.4 8.3 10.8   BUN mg/dL 20 21* 21*   CREATININE mg/dL 0.96 0.98 0.87   GLUCOSE mg/dL 152* 123* 203*     Results from last 7 days   Lab Units 02/19/24  1628   INR  1.79*     COMPLEXITY OF DATA / DECISION MAKING     []  Moderate: One acute illness or mild exacerbation of chronic or 2 stable chronic or tx side effects   []  High:  Severe acute illness or severe exacerbation of chronic - potential for major debility / life threatening         I have personally reviewed the results from the time of this admission to 2/21/2024 12:23 EST:  []  Laboratory:  []  Microbiology: []  Radiology:  []  Telemetry:   []  Cardiology/Vascular:  []  Pathology:  []  Prior external records:  []  Independent historian provided additional details:      []  Discussed case with specialists:    []  Independent interpretation of ECG/Imaging etc:             []  Moderate: Rx management, low risk surgery, suboptimal social situation   []  High:  Rx with close monitoring for toxicity, mod-high risk surgery, DNR decision, Comfort initiated, IV pain  meds    Assessment / Plan   Assessment:    Acute blood loss anemia  Recurrent upper GI bleed  Chronic erosive gastritis with oozing blood noted treated with APC  Portal hypertensive gastropathy  History of duodenitis gastritis  Thrombocytopenia secondary to liver cirrhosis  Liver cirrhosis secondary to Ruiz  Mild to moderate ascites  History of asthma  Anxiety  Depression  Nursing home resident/[place] facility     Plan:    Transfuse PRBCs for active bleeding or Hgb less than 7 g/dl  Gastroenterology consult appreciated  Status post EGD 2/20 (Severe inflammation with hemorrhage, congestion, erosions, friability in the cardia/gastric body.  Status post fulguration with argon plasma coagulation)  PPI changed to oral formulation  Octreotide changed to subcu formulation  Diet being advanced  Continue rifaximin/lactulose  Avoiding anticoagulants/antiplatelets  Return to nursing facility when medically stabilized  Discussed plan with RN.  DVT prophylaxis:  Mechanical DVT prophylaxis orders are present.      CODE STATUS:      Level Of Support Discussed With: Patient  Code Status (Patient has no pulse and is not breathing): CPR (Attempt to Resuscitate)  Medical Interventions (Patient has pulse or is breathing): Full Support       Electronically signed by SOLEDAD Hernandez, 02/21/24, 12:23 PM EST.      Attending documentation:  I reviewed the above documentation and independently reviewed and rounded and evaluated the patient and discussed the care plan with SANDOR Goetz PA-C, I agree with his findings and plan as documented, what I have added to the care plan and modified is as follows in my documentation and my medical decision making; 58-year-old male hospitalized on 2/19/2024 for chief complaint and anemia, black tarry stools.  Abdominal pain.  GI consulted.  Underwent EGD after requiring 2 units of PRBC transfusions after hemoglobin failed to improve.  He had erosive gastritis with oozing of the blood, treated with  argon plasma coagulation.  He also had duodenitis using a blood was also seen and stopped during procedure.  He was transition from continuous octreotide infusion to 3 times daily basis, as well as Protonix transition to IV after procedure.  Interval follow-up: Seen and examined this morning, no acute distress, no acute major night events, hemoglobin corrected to 7.6 this morning, no further bleeding.  Continues to have nausea and epigastric abdominal pain.  Remains weak and fatigued.  Blood pressures are stable, not seem to be tachycardic, telemetry reviewed, sinus rhythm.  Review of systems obtained and all systems reviewed and negative except weakness, fatigue, abdominal pain, epigastric.  Vitals reviewed, labs reviewed.  On physical exam, elderly appearing male older than stated age, laying in bed, with generalized fatigue, regular rate and rhythm, clear to auscultation bilaterally, soft distended abdomen with mild tenderness in epigastric region.  Alert and oriented x 3.  Assessment as above, symptomatic anemia with acute GI bleed due to upper GI bleeding due to erosive gastritis and duodenitis.  Continue following every 6 hour hemoglobin blood work, continue octreotide to 3 times daily, continue Protonix twice daily, additional unit of PRBC to be ordered if hemoglobin drops to less than 7, GI consulted discussed with Dr. Jansen, recommendations appreciated, continue Carafate, advance diet per tolerance, continue rifaximin and lactulose, Zofran for nausea, continue telemetry monitoring, a.m. labs, full code, DVT prophylax with SCDs in the setting of GI bleed, clinical course dictate further management, discussed with nurse at the bedside.  More than 70 % of the time of this patient's encounter was performed by me, this included face-to-face time, planning and coordinating, medical decision making and critical thinking personally done by me.      Electronically signed by Bandar Burch MD, 02/21/24, 2:30 PM  EST.  Portions of this documentation were transcribed electronically from a voice recognition software.  I confirm all data accurately represents the service(s) I performed at today's visit.
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
## 2024-02-21 NOTE — PLAN OF CARE
Goal Outcome Evaluation:   VSS. SR. No s/s of abnormal bleeding noted HGB stable at 7.7. Ongoing complaints of back pain, medicated see   MAR.

## 2024-02-21 NOTE — PROGRESS NOTES
[facility], [place], operated by [facility]  Inpatient Progress Note    Reason for Follow Up: Upper GI bleed    Interval History: beena Nicolas is a 58 y.o. male who as admitted to the hospital on 2/19/2024  3:27 PM with hemoglobin of 6.9 g/dL without obvious GI symptoms of melena, hematochezia, bright red blood per rectum, coffee-ground emesis or hematemesis. Patient was discharged about 2 weeks ago, when he was admitted with upper GI bleed and found to have cratered ulcer at the gastric cardia seen on retroflexion and treated with APC. Erosive gastritis with oozing of blood that stopped by itself and duodenitis noted.  Patient was discharged with hemoglobin of 7.9 g/dL.  His lab workup showed iron deficiency anemia and recommendations were made for IV iron infusion but apparently he did not receive it.  On Sunday morning, she felt headed and at the nursing facility blood workup revealed hemoglobin of 6.3 g/dL.  Patient transferred to the ED and repeat lab showed hemoglobin of 6.9 g/dL.  Patient admitted and started on Protonix and octreotide gtt. FOBT was negative.  He received 2 unit of PRBC and hemoglobin count is is stable at 7.7 g/dL.      Patient underwent EGD on 02/20/2024 with the following findings:    - Z-line regular, 40 cm from the incisors.   - Normal mucosa was found in the proximal esophagus, in the mid esophagus and in the distal esophagus.   - Chronic erosive gastritis with oozing of blood noted. Treated with argon plasma coagulation (APC).   - Gastric mucosal changes suggestive of Portal Hypertensive Gastropathy   - Duodenitis and on contact of mucosa with scope oozing of blood that stopped without any intervention   - Healed scar of gastric cardia ulcer with whitish scab.    Protonix and octreotide GGT discontinued and is started on Protonix 40 mg twice daily with Carafate and octreotide subcu      Current Facility-Administered Medications:     albuterol (PROVENTIL) nebulizer solution 0.083% 2.5  mg/3mL, 2.5 mg, Nebulization, Q6H PRN, Kenny Woodard PA    arformoterol (BROVANA) nebulizer solution 15 mcg, 15 mcg, Nebulization, BID - RT, Andrea Jansen MD, 15 mcg at 02/21/24 0829    budesonide (PULMICORT) nebulizer solution 0.5 mg, 0.5 mg, Nebulization, BID - RT, Kenny Woodard PA, 0.5 mg at 02/21/24 0829    busPIRone (BUSPAR) tablet 7.5 mg, 7.5 mg, Oral, TID, Kenny Woodard PA, 7.5 mg at 02/21/24 0838    dextrose (D50W) (25 g/50 mL) IV injection 25 g, 25 g, Intravenous, Q15 Min PRN, Andrea Jansen MD    dextrose (GLUTOSE) oral gel 15 g, 15 g, Oral, Q15 Min PRN, Andrea Jansen MD    fluticasone (FLONASE) 50 MCG/ACT nasal spray 2 spray, 2 spray, Nasal, Daily, Kenny Woodard PA, 2 spray at 02/21/24 0841    [Held by provider] furosemide (LASIX) tablet 20 mg, 20 mg, Oral, Daily, Kenny Woodard PA    glucagon (GLUCAGEN) injection 1 mg, 1 mg, Intramuscular, Q15 Min PRN, Andrea Jansen MD    HYDROcodone-acetaminophen (NORCO)  MG per tablet 1 tablet, 1 tablet, Oral, Q6H PRN, Bandar Burch MD, 1 tablet at 02/21/24 0410    HYDROcodone-acetaminophen (NORCO) 5-325 MG per tablet 1 tablet, 1 tablet, Oral, Q6H PRN, Bandar Burch MD, 1 tablet at 02/21/24 1022    insulin regular (humuLIN R,novoLIN R) injection 2-7 Units, 2-7 Units, Subcutaneous, 4x Daily AC & at Bedtime, Awa Simons PA-C, 2 Units at 02/21/24 1149    ipratropium-albuterol (DUO-NEB) nebulizer solution 3 mL, 3 mL, Nebulization, Q6H PRN, Andrea Jansen MD    ipratropium-albuterol (DUO-NEB) nebulizer solution 3 mL, 3 mL, Nebulization, Q4H PRN, Kenny Woodard PA    lactulose (CHRONULAC) 10 GM/15ML solution 10 g, 10 g, Oral, BID, Kenny Woodard PA, 10 g at 02/21/24 0839    morphine injection 2 mg, 2 mg, Intravenous, Q6H PRN, Bandar Burch MD, 2 mg at 02/21/24 1148    nitroglycerin (NITROSTAT) SL tablet 0.4 mg, 0.4 mg, Sublingual, Q5 Min PRN, Andrea Jansen MD    ondansetron (ZOFRAN)  injection 4 mg, 4 mg, Intravenous, Q6H PRN, Andrea Jansen MD, 4 mg at 02/21/24 1148    pantoprazole (PROTONIX) injection 40 mg, 40 mg, Intravenous, Q12H, Andrea Jansen MD, 40 mg at 02/21/24 0838    polyethyl glycol-propyl glycol (SYSTANE) 0.4-0.3 % ophthalmic solution (artificial tears) 2 drop, 2 drop, Both Eyes, Q1H PRN, Kenny Woodard PA    riFAXIMin (XIFAXAN) tablet 550 mg, 550 mg, Oral, Q12H, Kenny Woodard PA, 550 mg at 02/21/24 0838    rOPINIRole (REQUIP) tablet 1 mg, 1 mg, Oral, Nightly, Kenny Woodard PA, 1 mg at 02/20/24 2150    sertraline (ZOLOFT) tablet 100 mg, 100 mg, Oral, Nightly, Kenny Woodard PA, 100 mg at 02/20/24 2150    sodium chloride 0.9 % flush 10 mL, 10 mL, Intravenous, Q12H, Andrea Jansen MD, 10 mL at 02/21/24 0838    sodium chloride 0.9 % flush 10 mL, 10 mL, Intravenous, PRN, Andrea Jansen MD    sodium chloride 0.9 % infusion 40 mL, 40 mL, Intravenous, PRN, Andrea Jansen MD    [Held by provider] spironolactone (ALDACTONE) tablet 100 mg, 100 mg, Oral, BID, Kenny Woodard PA    sucralfate (CARAFATE) tablet 1 g, 1 g, Oral, 4x Daily AC & at Bedtime, Andrea Jansen MD, 1 g at 02/21/24 1149    Objective     Vital Signs  Temp:  [97.7 °F (36.5 °C)-98.4 °F (36.9 °C)] 97.7 °F (36.5 °C)  Heart Rate:  [66-82] 74  Resp:  [18-19] 19  BP: (131-169)/(61-82) 154/67  Body mass index is 44.52 kg/m².    Intake/Output Summary (Last 24 hours) at 2/21/2024 1624  Last data filed at 2/21/2024 1400  Gross per 24 hour   Intake 770 ml   Output --   Net 770 ml     I/O this shift:  In: 350 [P.O.:350]  Out: -      Physical Exam:  General     Alert and oriented, normal affect   Head:    Normocephalic, without obvious abnormality, atraumatic   Eyes:          conjunctivae and sclerae normal, no icterus, pupils round and reactive to light   Oral cavity: Moist and intact, normal dentation   Neck:   Supple, no adenopathy   Chest Wall/Lungs:    No abnormalities observed, equal on  "expansion bilaterally, No use of extrarespiratory muscles noted   Abdomen:     Soft, nondistended, nontender; normal bowel sounds, no hepatospleenomegaly, no ascites   Extremities:   no edema, clubbing, or cyanosis   Skin:   No bruising or rash   Psychiatric:  Normal mood and insight       Results Review:      Results from last 7 days   Lab Units 02/21/24  1147 02/21/24  0518 02/21/24  0029 02/20/24  1253 02/20/24  0522 02/19/24  1628   WBC 10*3/mm3  --  4.52  --   --  3.69 4.01   HEMOGLOBIN g/dL 7.7* 7.6* 7.7*   < > 6.9* 6.9*   HEMATOCRIT % 24.7* 25.2* 24.9*   < > 23.4* 23.1*   PLATELETS 10*3/mm3  --  80*  --   --  78* 92*    < > = values in this interval not displayed.     Results from last 7 days   Lab Units 02/21/24  0518 02/20/24  0522 02/19/24  1628   SODIUM mmol/L 138 139 140   POTASSIUM mmol/L 4.3 4.2 4.1   CHLORIDE mmol/L 105 106 104   CO2 mmol/L 23.6 24.7 25.2   BUN mg/dL 20 21* 21*   CREATININE mg/dL 0.96 0.98 0.87   CALCIUM mg/dL 8.2* 8.3* 8.4*   BILIRUBIN mg/dL 2.0* 1.8* 1.1   ALK PHOS U/L 85 87 99   ALT (SGPT) U/L 16 17 19   AST (SGOT) U/L 31 29 32   GLUCOSE mg/dL 152* 123* 203*     Invalid input(s): \"3\"   Lab Results   Lab Value Date/Time    LIPASE 35 12/01/2023 1204    LIPASE 42 05/18/2023 1433    LIPASE 63 (H) 03/24/2023 1040    LIPASE 38 02/05/2023 1532    LIPASE 40 12/18/2022 0238    LIPASE 55 11/04/2022 1731    LIPASE 68 (H) 10/31/2022 0924    LIPASE 52 06/27/2022 1449    LIPASE 20 08/03/2021 1206    LIPASE 28 06/01/2021 1622       Radiology:  CT Abdomen Pelvis With Contrast    Result Date: 2/19/2024    1. Cirrhotic liver in changes of portal venous hypertension all of which appears stable from prior study. 2. Cholelithiasis 3. Mild-to-moderate ascites throughout the abdomen and pelvis 4. No acute process seen within the abdomen or pelvis.     BISHOP BATES MD       Electronically Signed and Approved By: BISHOP BATES MD on 2/19/2024 at 18:31               Impression:      Recurrent upper GI " bleed   2.   History of Iron deficiency anemia  3.   Chronic erosive gastritis and duodenitis  4.   Portal hypertensive gastropathy  5.   History of recent bleeding gastric ulcer in the cardia s/p APC  6.   Cirrhosis of liver     Plan and Recommendations: Patient with known history of cirrhosis of liver likely due to ROMO has recurrent UGI bleed due to erosive gastritis with portal hypertensive gastropathy.  Will start carvedilol 3.125 mg orally twice daily hold for SBP less than 90 mmHg and heart rate less than 55/min.  Continue PPI twice daily.  Will add Carafate and give octreotide depot x 1.  Would recommend IV iron infusion to replete iron and that will help to improve hemoglobin    I discussed the patients findings and my recommendations with patient and nursing staff.      Electronically signed by Andrea Jansen MD, 02/21/24, 4:30PM EST.

## 2024-02-21 NOTE — PLAN OF CARE
Problem: Adult Inpatient Plan of Care  Goal: Plan of Care Review  Outcome: Ongoing, Progressing  Flowsheets (Taken 2/21/2024 0626)  Progress: improving  Plan of Care Reviewed With: patient  Outcome Evaluation: Pt. A&Ox4. Pain treated per MAR. EGD 2/20/24, hgb this AM 7.6. Protonix dc'd yesterday, octreotide SQ given per MAR. VSS.Urinal w/ in reach. Pt. hoping to go home today. Bed low and locked, call light w/ in reach. WCTM.  Goal: Patient-Specific Goal (Individualized)  Outcome: Ongoing, Progressing  Goal: Absence of Hospital-Acquired Illness or Injury  Outcome: Ongoing, Progressing  Intervention: Identify and Manage Fall Risk  Recent Flowsheet Documentation  Taken 2/20/2024 2000 by Kj Mayes RN  Safety Promotion/Fall Prevention: safety round/check completed  Intervention: Prevent Skin Injury  Recent Flowsheet Documentation  Taken 2/20/2024 2000 by Kj Mayes RN  Body Position: position changed independently  Intervention: Prevent and Manage VTE (Venous Thromboembolism) Risk  Recent Flowsheet Documentation  Taken 2/20/2024 2000 by Kj Mayes RN  Activity Management: activity encouraged  Range of Motion: active ROM (range of motion) encouraged  Goal: Optimal Comfort and Wellbeing  Outcome: Ongoing, Progressing  Intervention: Provide Person-Centered Care  Recent Flowsheet Documentation  Taken 2/20/2024 2000 by Kj Mayes RN  Trust Relationship/Rapport:   care explained   choices provided   questions answered   questions encouraged   reassurance provided   thoughts/feelings acknowledged  Goal: Readiness for Transition of Care  Outcome: Ongoing, Progressing     Problem: Anemia  Goal: Anemia Symptom Improvement  Outcome: Ongoing, Progressing  Intervention: Monitor and Manage Anemia  Recent Flowsheet Documentation  Taken 2/20/2024 2000 by Kj Mayes RN  Safety Promotion/Fall Prevention: safety round/check completed     Problem: Skin Injury Risk Increased  Goal: Skin Health and  Integrity  Outcome: Ongoing, Progressing  Intervention: Optimize Skin Protection  Recent Flowsheet Documentation  Taken 2/20/2024 2000 by Kj Mayes, RN  Head of Bed (HOB) Positioning: HOB elevated     Problem: Asthma Comorbidity  Goal: Maintenance of Asthma Control  Outcome: Ongoing, Progressing     Problem: Behavioral Health Comorbidity  Goal: Maintenance of Behavioral Health Symptom Control  Outcome: Ongoing, Progressing     Problem: Diabetes Comorbidity  Goal: Blood Glucose Level Within Targeted Range  Outcome: Ongoing, Progressing     Problem: Hypertension Comorbidity  Goal: Blood Pressure in Desired Range  Outcome: Ongoing, Progressing     Problem: Pain Chronic (Persistent) (Comorbidity Management)  Goal: Acceptable Pain Control and Functional Ability  Outcome: Ongoing, Progressing     Problem: Fall Injury Risk  Goal: Absence of Fall and Fall-Related Injury  Outcome: Ongoing, Progressing  Intervention: Promote Injury-Free Environment  Recent Flowsheet Documentation  Taken 2/20/2024 2000 by Kj Mayes RN  Safety Promotion/Fall Prevention: safety round/check completed   Goal Outcome Evaluation:  Plan of Care Reviewed With: patient        Progress: improving  Outcome Evaluation: Pt. A&Ox4. Pain treated per MAR. EGD 2/20/24, hgb this AM 7.6. Protonix dc'd yesterday, octreotide SQ given per MAR. VSS.Urinal w/ in reach. Pt. hoping to go home today. Bed low and locked, call light w/ in reach. WCTM.

## 2024-02-21 NOTE — PLAN OF CARE
Goal Outcome Evaluation:  Plan of Care Reviewed With: patient           Outcome Evaluation: Patient is a long-term care resident at a local nursing home.  He is WC bound at baseline and needs assistance from staff for all bed mobilities, transfers, and ADLs.  He will benefit from a PT consult upon return to the nursing home to maximize independence with functional mobility.      Anticipated Discharge Disposition (PT): extended care facility

## 2024-02-22 VITALS
SYSTOLIC BLOOD PRESSURE: 90 MMHG | WEIGHT: 292.77 LBS | DIASTOLIC BLOOD PRESSURE: 46 MMHG | HEIGHT: 68 IN | BODY MASS INDEX: 44.37 KG/M2 | HEART RATE: 68 BPM | TEMPERATURE: 97.6 F | RESPIRATION RATE: 16 BRPM | OXYGEN SATURATION: 100 %

## 2024-02-22 LAB
ALBUMIN SERPL-MCNC: 2.7 G/DL (ref 3.5–5.2)
ALP SERPL-CCNC: 86 U/L (ref 39–117)
ALT SERPL W P-5'-P-CCNC: 17 U/L (ref 1–41)
ANION GAP SERPL CALCULATED.3IONS-SCNC: 8.7 MMOL/L (ref 5–15)
AST SERPL-CCNC: 35 U/L (ref 1–40)
BASOPHILS # BLD AUTO: 0.04 10*3/MM3 (ref 0–0.2)
BASOPHILS NFR BLD AUTO: 1 % (ref 0–1.5)
BILIRUB CONJ SERPL-MCNC: 0.5 MG/DL (ref 0–0.3)
BILIRUB INDIRECT SERPL-MCNC: 0.9 MG/DL
BILIRUB SERPL-MCNC: 1.4 MG/DL (ref 0–1.2)
BUN SERPL-MCNC: 16 MG/DL (ref 6–20)
BUN/CREAT SERPL: 18.6 (ref 7–25)
CALCIUM SPEC-SCNC: 8.1 MG/DL (ref 8.6–10.5)
CHLORIDE SERPL-SCNC: 105 MMOL/L (ref 98–107)
CO2 SERPL-SCNC: 24.3 MMOL/L (ref 22–29)
CREAT SERPL-MCNC: 0.86 MG/DL (ref 0.76–1.27)
DEPRECATED RDW RBC AUTO: 62.9 FL (ref 37–54)
EGFRCR SERPLBLD CKD-EPI 2021: 101 ML/MIN/1.73
EOSINOPHIL # BLD AUTO: 0.1 10*3/MM3 (ref 0–0.4)
EOSINOPHIL NFR BLD AUTO: 2.6 % (ref 0.3–6.2)
ERYTHROCYTE [DISTWIDTH] IN BLOOD BY AUTOMATED COUNT: 21.9 % (ref 12.3–15.4)
GLUCOSE BLDC GLUCOMTR-MCNC: 156 MG/DL (ref 70–99)
GLUCOSE BLDC GLUCOMTR-MCNC: 186 MG/DL (ref 70–99)
GLUCOSE SERPL-MCNC: 158 MG/DL (ref 65–99)
HCT VFR BLD AUTO: 26 % (ref 37.5–51)
HCT VFR BLD AUTO: 26.3 % (ref 37.5–51)
HGB BLD-MCNC: 7.7 G/DL (ref 13–17.7)
HGB BLD-MCNC: 7.7 G/DL (ref 13–17.7)
IMM GRANULOCYTES # BLD AUTO: 0.02 10*3/MM3 (ref 0–0.05)
IMM GRANULOCYTES NFR BLD AUTO: 0.5 % (ref 0–0.5)
LYMPHOCYTES # BLD AUTO: 0.78 10*3/MM3 (ref 0.7–3.1)
LYMPHOCYTES NFR BLD AUTO: 20.5 % (ref 19.6–45.3)
MAGNESIUM SERPL-MCNC: 1.8 MG/DL (ref 1.6–2.6)
MCH RBC QN AUTO: 23.6 PG (ref 26.6–33)
MCHC RBC AUTO-ENTMCNC: 29.6 G/DL (ref 31.5–35.7)
MCV RBC AUTO: 79.8 FL (ref 79–97)
MONOCYTES # BLD AUTO: 0.43 10*3/MM3 (ref 0.1–0.9)
MONOCYTES NFR BLD AUTO: 11.3 % (ref 5–12)
NEUTROPHILS NFR BLD AUTO: 2.44 10*3/MM3 (ref 1.7–7)
NEUTROPHILS NFR BLD AUTO: 64.1 % (ref 42.7–76)
NRBC BLD AUTO-RTO: 0 /100 WBC (ref 0–0.2)
PHOSPHATE SERPL-MCNC: 2.5 MG/DL (ref 2.5–4.5)
PLATELET # BLD AUTO: 77 10*3/MM3 (ref 140–450)
PMV BLD AUTO: ABNORMAL FL
POTASSIUM SERPL-SCNC: 3.9 MMOL/L (ref 3.5–5.2)
PROT SERPL-MCNC: 5.2 G/DL (ref 6–8.5)
RBC # BLD AUTO: 3.26 10*6/MM3 (ref 4.14–5.8)
SODIUM SERPL-SCNC: 138 MMOL/L (ref 136–145)
WBC NRBC COR # BLD AUTO: 3.81 10*3/MM3 (ref 3.4–10.8)

## 2024-02-22 PROCEDURE — 99239 HOSP IP/OBS DSCHRG MGMT >30: CPT | Performed by: FAMILY MEDICINE

## 2024-02-22 PROCEDURE — 63710000001 INSULIN REGULAR HUMAN PER 5 UNITS

## 2024-02-22 PROCEDURE — 84100 ASSAY OF PHOSPHORUS: CPT | Performed by: FAMILY MEDICINE

## 2024-02-22 PROCEDURE — 94799 UNLISTED PULMONARY SVC/PX: CPT

## 2024-02-22 PROCEDURE — 82948 REAGENT STRIP/BLOOD GLUCOSE: CPT | Performed by: INTERNAL MEDICINE

## 2024-02-22 PROCEDURE — 83735 ASSAY OF MAGNESIUM: CPT | Performed by: FAMILY MEDICINE

## 2024-02-22 PROCEDURE — 85025 COMPLETE CBC W/AUTO DIFF WBC: CPT | Performed by: FAMILY MEDICINE

## 2024-02-22 PROCEDURE — 80076 HEPATIC FUNCTION PANEL: CPT | Performed by: FAMILY MEDICINE

## 2024-02-22 PROCEDURE — 94664 DEMO&/EVAL PT USE INHALER: CPT

## 2024-02-22 PROCEDURE — 82948 REAGENT STRIP/BLOOD GLUCOSE: CPT

## 2024-02-22 PROCEDURE — 80048 BASIC METABOLIC PNL TOTAL CA: CPT | Performed by: FAMILY MEDICINE

## 2024-02-22 PROCEDURE — 85018 HEMOGLOBIN: CPT | Performed by: FAMILY MEDICINE

## 2024-02-22 PROCEDURE — 85014 HEMATOCRIT: CPT | Performed by: FAMILY MEDICINE

## 2024-02-22 PROCEDURE — 25010000002 NA FERRIC GLUC CPLX PER 12.5 MG: Performed by: PHYSICIAN ASSISTANT

## 2024-02-22 RX ORDER — GABAPENTIN 100 MG/1
100 CAPSULE ORAL DAILY
Qty: 2 CAPSULE | Refills: 0 | Status: SHIPPED | OUTPATIENT
Start: 2024-02-22 | End: 2024-02-24

## 2024-02-22 RX ORDER — PANTOPRAZOLE SODIUM 40 MG/1
40 TABLET, DELAYED RELEASE ORAL 2 TIMES DAILY
Qty: 60 TABLET | Refills: 0 | Status: SHIPPED | OUTPATIENT
Start: 2024-02-22 | End: 2024-03-23

## 2024-02-22 RX ORDER — CARVEDILOL 6.25 MG/1
6.25 TABLET ORAL 2 TIMES DAILY WITH MEALS
Start: 2024-02-22 | End: 2024-02-22 | Stop reason: SDUPTHER

## 2024-02-22 RX ORDER — CARVEDILOL 3.12 MG/1
3.12 TABLET ORAL 2 TIMES DAILY WITH MEALS
Qty: 60 TABLET | Refills: 0 | Status: SHIPPED | OUTPATIENT
Start: 2024-02-22 | End: 2024-02-22 | Stop reason: HOSPADM

## 2024-02-22 RX ORDER — FERROUS GLUCONATE 324(38)MG
324 TABLET ORAL
Qty: 30 TABLET | Refills: 0 | Status: SHIPPED | OUTPATIENT
Start: 2024-02-22 | End: 2024-03-23

## 2024-02-22 RX ORDER — CARVEDILOL 6.25 MG/1
6.25 TABLET ORAL 2 TIMES DAILY WITH MEALS
Status: DISCONTINUED | OUTPATIENT
Start: 2024-02-22 | End: 2024-02-22 | Stop reason: HOSPADM

## 2024-02-22 RX ORDER — CARVEDILOL 6.25 MG/1
6.25 TABLET ORAL 2 TIMES DAILY WITH MEALS
Start: 2024-02-22

## 2024-02-22 RX ORDER — OXYCODONE HYDROCHLORIDE 10 MG/1
10 TABLET ORAL EVERY 4 HOURS PRN
Qty: 6 TABLET | Refills: 0 | Status: SHIPPED | OUTPATIENT
Start: 2024-02-22 | End: 2024-02-23

## 2024-02-22 RX ADMIN — SODIUM CHLORIDE 125 MG: 9 INJECTION, SOLUTION INTRAVENOUS at 10:43

## 2024-02-22 RX ADMIN — SUCRALFATE 1 G: 1 TABLET ORAL at 12:46

## 2024-02-22 RX ADMIN — HYDROCODONE BITARTRATE AND ACETAMINOPHEN 1 TABLET: 10; 325 TABLET ORAL at 04:48

## 2024-02-22 RX ADMIN — BUDESONIDE 0.5 MG: 0.5 INHALANT ORAL at 07:53

## 2024-02-22 RX ADMIN — PANTOPRAZOLE SODIUM 40 MG: 40 INJECTION, POWDER, FOR SOLUTION INTRAVENOUS at 09:50

## 2024-02-22 RX ADMIN — CARVEDILOL 3.12 MG: 3.12 TABLET, FILM COATED ORAL at 08:08

## 2024-02-22 RX ADMIN — BUSPIRONE HYDROCHLORIDE 7.5 MG: 7.5 TABLET ORAL at 09:50

## 2024-02-22 RX ADMIN — SUCRALFATE 1 G: 1 TABLET ORAL at 08:23

## 2024-02-22 RX ADMIN — ARFORMOTEROL TARTRATE 15 MCG: 15 SOLUTION RESPIRATORY (INHALATION) at 07:53

## 2024-02-22 RX ADMIN — FLUTICASONE PROPIONATE 2 SPRAY: 50 SPRAY, METERED NASAL at 09:51

## 2024-02-22 RX ADMIN — RIFAXIMIN 550 MG: 550 TABLET ORAL at 09:50

## 2024-02-22 RX ADMIN — INSULIN HUMAN 2 UNITS: 100 INJECTION, SOLUTION PARENTERAL at 08:08

## 2024-02-22 RX ADMIN — Medication 10 ML: at 09:51

## 2024-02-22 NOTE — DISCHARGE SUMMARY
[facility]  HOSPITALIST  DISCHARGE SUMMARY       Patient Name: Nic Nicolas  : 1965  MRN: [id number]  Primary Care Physician: Sheldon Carcamo MD    Date of Admission: 2024  Date of Discharge: 2024  Discharge Diagnoses   Acute blood loss anemia / Recurrent upper GI bleed  Chronic erosive gastritis with oozing blood noted (treated with APC)  Status post EGD  (Severe inflammation with hemorrhage, congestion, erosions, friability in the cardia/gastric body. Status post fulguration with argon plasma coagulation)   Portal hypertensive gastropathy  History of duodenitis gastritis  Thrombocytopenia secondary to liver cirrhosis  Liver cirrhosis secondary to Ruiz  Mild to moderate ascites  History of asthma  Anxiety  Depression  Nursing home resident/[facility]  Hospital Course   Hospital Course:  Nic Nicolas is a pleasant 58 y.o. male presented to the ED for worsening anemia noted on routine labs.  In the ED, Hgb in the 6.9 range.  Patient was subsequently admitted to the hospitalist service.  Gastroenterologist consulted.  Started on Protonix and octreotide drip.  Subsequently underwent EGD on 24 which showed severe inflammation and erosions/oozing gastritis.  Status post fulguration with argon plasma coagulation.  He was given 1 unit PRBCs.  Hemoglobin has been in the 7.7-8.0 range subsequently.  Patient rated procedure well.  Patient is adjusted by gastroenterology.  Started on carvedilol.  Continued on twice daily PPI.  Given octreotide Depo shot.  Received IV iron.  He is improved and feeling much better and remains medically stable.  He will return to the nursing home.  Need to follow-up with PCP in 3 to 5 days at [facility].  Periodic monitoring of hemoglobin.  Ambulatory referral to gastroenterologist made.    Discharge Follow Up / Recommendations (labs, diagnostics, meds, etc):   As above  Future Appointments   Date Time Provider Department Center    2/28/2024  8:45 AM Irene Alcantara, APRN [facility]     Consultants     Consults       Date and Time Order Name Status Description    2/19/2024  7:08 PM Inpatient Hospitalist Consult      2/19/2024  6:44 PM IP Consult to Gastroenterology      1/31/2024  4:55 PM Inpatient Gastroenterology Consult Completed           On Day of Discharge   VS: Temp:  [97.6 °F (36.4 °C)-98.2 °F (36.8 °C)] 97.6 °F (36.4 °C)  Heart Rate:  [65-74] 67  Resp:  [18-24] 18  BP: ()/(45-71) 147/67  EXAM:  (refer to progress note from 2/22/2024)     Discharge Medications        New Medications        Instructions Start Date   carvedilol 3.125 MG tablet  Commonly known as: COREG   3.125 mg, Oral, 2 Times Daily With Meals      carvedilol 6.25 MG tablet  Commonly known as: COREG   6.25 mg, Oral, 2 Times Daily With Meals      pantoprazole 40 MG EC tablet  Commonly known as: Protonix   40 mg, Oral, 2 Times Daily             Changes to Medications        Instructions Start Date   ferrous gluconate 324 MG tablet  Commonly known as: FERGON  What changed: Another medication with the same name was added. Make sure you understand how and when to take each.   324 mg, Oral, Daily With Breakfast      ferrous gluconate 324 MG tablet  Commonly known as: FERGON  What changed: You were already taking a medication with the same name, and this prescription was added. Make sure you understand how and when to take each.   324 mg, Oral, Daily With Breakfast      furosemide 40 MG tablet  Commonly known as: LASIX  What changed: how much to take   TAKE 1 TABLET BY MOUTH 2 (TWO) TIMES A DAY.      riFAXIMin 550 MG tablet  Commonly known as: XIFAXAN  What changed: when to take this   550 mg, Oral, Every 12 Hours Scheduled      vitamin D 1.25 MG (01849 UT) capsule capsule  Commonly known as: ERGOCALCIFEROL  What changed: additional instructions   50,000 Units, Oral, Every 7 Days             Continue These Medications        Instructions Start Date   albuterol  sulfate  (90 Base) MCG/ACT inhaler  Commonly known as: PROVENTIL HFA;VENTOLIN HFA;PROAIR HFA   2 puffs, Inhalation, Every 4 Hours PRN      busPIRone 7.5 MG tablet  Commonly known as: BUSPAR   7.5 mg, Oral, 3 Times Daily      cetirizine 10 MG tablet  Commonly known as: zyrTEC   10 mg, Oral, Daily      Diclofenac Sodium 1 % gel gel  Commonly known as: VOLTAREN   4 g, Topical, 4 Times Daily      diphenhydrAMINE 25 mg capsule  Commonly known as: BENADRYL   25 mg, Oral, Every 6 Hours PRN      Flovent  MCG/ACT inhaler  Generic drug: fluticasone   1 puff, Inhalation, 2 Times Daily - RT      fluticasone 50 MCG/ACT nasal spray  Commonly known as: Flonase   2 sprays, Nasal, Daily      gabapentin 100 MG capsule  Commonly known as: NEURONTIN   100 mg, Oral, Daily      HumaLOG KwikPen 100 UNIT/ML solution pen-injector  Generic drug: Insulin Lispro (1 Unit Dial)   15 Units, Subcutaneous, 3 Times Daily Before Meals      ipratropium-albuterol 0.5-2.5 mg/3 ml nebulizer  Commonly known as: DUO-NEB   3 mL, Nebulization, Every 4 Hours PRN      lactulose 10 GM/15ML solution solution (encephalopathy)  Commonly known as: Generlac   68 mL, Oral, 2 Times Daily      Levemir FlexPen 100 UNIT/ML injection  Generic drug: insulin detemir   10 Units, Subcutaneous, Daily      magnesium oxide 400 MG tablet  Commonly known as: MAG-OX   400 mg, Oral, Daily      naloxone 4 MG/0.1ML nasal spray  Commonly known as: NARCAN   CALL 911. Do not prime. Spray into nostril upon signs of opioid overdose. May repeat in 2 to 3 minutes in opposite nostril if no or minimal breathing and responsiveness, then as needed (if doses are available) every 2 to 3 minutes.      neomycin-bacitracin-polymyxin 5-400-5000 ointment   1 Application, Topical, Take As Directed      nystatin 223279 UNIT/GM powder  Commonly known as: MYCOSTATIN   1 Application, Topical, 2 Times Daily      oxyCODONE 10 MG tablet  Commonly known as: ROXICODONE   10 mg, Oral, Every 4 Hours  PRN      polyethyl glycol-propyl glycol 0.4-0.3 % solution ophthalmic solution (artificial tears)  Commonly known as: SYSTANE   2 drops, Both Eyes, Every 1 Hour PRN      rOPINIRole 1 MG tablet  Commonly known as: REQUIP   1 mg, Oral, Nightly, take 1 hour before bedtime      sertraline 100 MG tablet  Commonly known as: ZOLOFT   100 mg, Oral, Nightly      spironolactone 100 MG tablet  Commonly known as: Aldactone   100 mg, Oral, 2 Times Daily      sucralfate 1 g tablet  Commonly known as: CARAFATE   1 g, Oral, 4 Times Daily Before Meals & Nightly             Stop These Medications      dextromethorphan-guaifenesin  MG/5ML syrup  Commonly known as: ROBITUSSIN-DM     omeprazole 40 MG capsule  Commonly known as: priLOSEC            Procedures   EGD  Imaging     Results for orders placed during the hospital encounter of 02/24/22    Duplex Carotid Ultrasound CAR    Interpretation Summary  · No significant stenosis is present in carotid bifurcations bilaterally.  · There are right mid internal carotid artery velocity elevations that would meet criteria for mild to moderate stenosis, however, this appears to be related to tortuosity in this region.  · No significant plaque in the bifurcations bilaterally.  · Vertebral flow is antegrade bilaterally.    Results for orders placed during the hospital encounter of 01/19/22    Adult Transthoracic Echo Complete W/ Cont if Necessary Per Protocol (With Agitated Saline)    Interpretation Summary  · Saline test results are negative.  · Estimated left ventricular EF = 59% Left ventricular systolic function is normal.  · Left ventricular diastolic function was normal.    CT Abdomen Pelvis With Contrast    Result Date: 2/19/2024  PROCEDURE: CT ABDOMEN PELVIS W CONTRAST  COMPARISON: [facility], CT, CT ABDOMEN PELVIS W CONTRAST, 12/01/2023, 13:53.  INDICATIONS: abd pain, hx of gi bleed  TECHNIQUE: After obtaining the patient's consent, CT images were created with  non-ionic intravenous contrast material.   PROTOCOL:   Standard imaging protocol performed    RADIATION:   DLP: 829 mGy*cm   Automated exposure control was utilized to minimize radiation dose. CONTRAST: 100cc Isovue 370 I.V.  FINDINGS:  Visualized lung bases are clear.  Liver is again small and nodular in configuration compatible with cirrhosis.  No focal hepatic mass identified.  Calcified stones are seen within the gallbladder.  No inflammatory change around the gallbladder.  No biliary tract obstruction.  Pancreas is within normal limits.  Spleen is enlarged measuring 15.5 cm in length.  The splenic vein, superior mesenteric vein, and portal vein are patent.  Multiple enlarged veins are seen within the gastrohepatic ligament related to changes of portal venous hypertension.  Bilateral adrenal glands are within normal limits.  Kidneys appear normal bilaterally.  No hydronephrosis.  No abdominal or retroperitoneal adenopathy.  There is a small amount of ascites.  There is a fat containing umbilical hernia.  The upper GI tract is within normal limits.  Pelvis:  The urinary bladder appears within normal limits.  There is scattered colonic diverticula.  No evidence of diverticulitis.  GI tract otherwise unremarkable.  The appendix is not visualized.  There is moderate ascites within the pelvis.  No pelvic or inguinal adenopathy.  There are multiple enlarged pelvic veins again related changes of portal venous hypertension. There are old healed fractures of the left side of the pelvis.  There are surgical plates and multiple surgical screws in place within the left iliac bone and superior pubic ramus.  No acute fracture.  No lytic or sclerotic bony lesion identified.        1. Cirrhotic liver in changes of portal venous hypertension all of which appears stable from prior study. 2. Cholelithiasis 3. Mild-to-moderate ascites throughout the abdomen and pelvis 4. No acute process seen within the abdomen or pelvis.     BISHOP  MD PAULO       Electronically Signed and Approved By: BISHOP BATES MD on 2/19/2024 at 18:31             Discharge Details   Hospital Diet:     Diet Order   Procedures    Diet: Liquid Diets; Full Liquid; Fluid Consistency: Thin (IDDSI 0)     CODE STATUS:    Code Status and Medical Interventions:   Ordered at: 02/19/24 2125     Level Of Support Discussed With:    Patient     Code Status (Patient has no pulse and is not breathing):    CPR (Attempt to Resuscitate)     Medical Interventions (Patient has pulse or is breathing):    Full Support     Additional Instructions for the Follow-ups that You Need to Schedule       Discharge Follow-up with PCP   As directed       Currently Documented PCP:    Sheldon Carcamo MD    PCP Phone Number:    [phone]     Follow Up Details: 3 to 7 days              Discharge Disposition: Skilled Nursing Facility ([place] - External)  Pertinent  Labs   LAB RESULTS:      Lab 02/22/24  0507 02/22/24  0017 02/21/24  1752 02/21/24  1147 02/21/24  0518 02/20/24  1253 02/20/24  0522 02/19/24  1628   WBC 3.81  --   --   --  4.52  --  3.69 4.01   HEMOGLOBIN 7.7* 7.7* 8.0* 7.7* 7.6*   < > 6.9* 6.9*   HEMATOCRIT 26.0* 26.3* 27.5* 24.7* 25.2*   < > 23.4* 23.1*   PLATELETS 77*  --   --   --  80*  --  78* 92*   NEUTROS ABS 2.44  --   --   --  3.12  --  2.38 2.80   IMMATURE GRANS (ABS) 0.02  --   --   --  0.02  --  0.01 0.02   LYMPHS ABS 0.78  --   --   --  0.76  --  0.73 0.62*   MONOS ABS 0.43  --   --   --  0.52  --  0.43 0.47   EOS ABS 0.10  --   --   --  0.07  --  0.11 0.07   MCV 79.8  --   --   --  78.8*  --  78.8* 78.0*   PROTIME  --   --   --   --   --   --   --  21.1*    < > = values in this interval not displayed.         Lab 02/22/24  0507 02/21/24  0518 02/20/24  0522 02/19/24  1628   SODIUM 138 138 139 140   POTASSIUM 3.9 4.3 4.2 4.1   CHLORIDE 105 105 106 104   CO2 24.3 23.6 24.7 25.2   ANION GAP 8.7 9.4 8.3 10.8   BUN 16 20 21* 21*   CREATININE 0.86 0.96 0.98 0.87   EGFR 101.0 92.2  89.9 100.6   GLUCOSE 158* 152* 123* 203*   CALCIUM 8.1* 8.2* 8.3* 8.4*   MAGNESIUM 1.8 1.7 1.6  --    PHOSPHORUS 2.5 3.0 3.5  --          Lab 02/22/24  0507 02/21/24  0518 02/20/24  0522 02/19/24  1628   TOTAL PROTEIN 5.2* 5.0* 5.2* 5.6*   ALBUMIN 2.7* 2.7* 2.7* 2.9*   GLOBULIN  --   --  2.5 2.7   ALT (SGPT) 17 16 17 19   AST (SGOT) 35 31 29 32   BILIRUBIN 1.4* 2.0* 1.8* 1.1   INDIRECT BILIRUBIN 0.9 1.3  --   --    BILIRUBIN DIRECT 0.5* 0.7*  --   --    ALK PHOS 86 85 87 99         Lab 02/19/24  1628   PROTIME 21.1*   INR 1.79*             Lab 02/20/24  0522 02/19/24  1715 02/19/24  1628   IRON 47*  --   --    IRON SATURATION (TSAT) 13*  --   --    TIBC 373  --   --    TRANSFERRIN 250  --   --    FOLATE  --   --  13.60   VITAMIN B 12  --   --  750   ABO TYPING  --  A  --    RH TYPING  --  Positive  --    ANTIBODY SCREEN  --  Negative  --          Brief Urine Lab Results  (Last result in the past 365 days)        Color   Clarity   Blood   Leuk Est   Nitrite   Protein   CREAT   Urine HCG        12/01/23 1228 Yellow   Clear   Negative   Negative   Negative   Negative                 Microbiology Results (last 10 days)       ** No results found for the last 240 hours. **          Labs Pending at Discharge:   Time spent on Discharge including face to face service: > 30 minutes  Electronically signed by SOLEDAD Hernandez, 02/22/24, 10:11 AM EST.         Attending documentation:  I reviewed the above documentation and independently reviewed and rounded and evaluated the patient and discussed the care plan with SANDOR Goetz PA-C, I agree with his findings and plan as documented, what I have added to the care plan and modified is as follows in my documentation and my medical decision making and discharge plan; 58-year-old male hospitalized on 2/19/2024 for chief complaint and anemia, black tarry stools.  Abdominal pain.  GI consulted.  Underwent EGD after requiring 2 units of PRBC transfusions after hemoglobin failed to  improve.  He had erosive gastritis with oozing of the blood, treated with argon plasma coagulation.  He also had duodenitis using a blood was also seen and stopped during procedure.  He was transition from continuous octreotide infusion to 3 times daily basis, as well as Protonix transition to IV after procedure.  Bleeding subsided.  He tolerated oral intake.  He was discharged in hemodynamically stable condition after starting carvedilol 3.125 mg p.o. twice daily.  In addition to Carafate and Protonix twice daily.  Iron supplementation started.  May need iron infusions as outpatient.  Discharged in hemodynamically stable condition on 2/22/2024 to return to facility where he resides at.  Follow-up with GI in 4 to 6 weeks. Interval follow-up: Seen and examined on the day of discharge, no acute distress, no acute major night events, hemoglobin stable, no bleeding noted in addition to no abdominal pain.  Review of systems obtained and all systems reviewed and negative except weakness, fatigue.  Vitals reviewed, labs reviewed.  On physical exam, elderly appearing male older than stated age, laying in bed, with generalized fatigue, regular rate and rhythm, clear to auscultation bilaterally, soft distended abdomen with no tenderness in epigastric region.  Alert and oriented x 3.  Discharge plan reviewed, all questions answered, discussed with nurse at the bedside.  Total discharge time 45 minutes.  More than 95 % of the time of this patient's encounter was performed by me, this included face-to-face time, planning and coordinating, medical decision making and critical thinking personally done by me.    Electronically signed by Bandar Burch MD, 02/22/24, 5:49 PM EST.  Portions of this documentation were transcribed electronically from a voice recognition software.  I confirm all data accurately represents the service(s) I performed at today's visit.

## 2024-02-22 NOTE — PLAN OF CARE
Problem: Adult Inpatient Plan of Care  Goal: Plan of Care Review  Outcome: Ongoing, Progressing  Flowsheets  Taken 2/22/2024 0612  Plan of Care Reviewed With: patient  Outcome Evaluation: Pt. A&Ox4. Resting in bed at this time. Pt. hoping to go home today. Pain meds. given per EMAR. Bed low and locked, call light w/ in reach. WCTM.  Taken 2/21/2024 0626  Progress: improving  Goal: Patient-Specific Goal (Individualized)  Outcome: Ongoing, Progressing  Goal: Absence of Hospital-Acquired Illness or Injury  Outcome: Ongoing, Progressing  Intervention: Identify and Manage Fall Risk  Recent Flowsheet Documentation  Taken 2/21/2024 2000 by Kj Mayes RN  Safety Promotion/Fall Prevention: safety round/check completed  Intervention: Prevent Skin Injury  Recent Flowsheet Documentation  Taken 2/21/2024 2000 by Kj Mayes RN  Body Position: position changed independently  Intervention: Prevent and Manage VTE (Venous Thromboembolism) Risk  Recent Flowsheet Documentation  Taken 2/21/2024 2000 by Kj Mayes RN  Activity Management: activity encouraged  Goal: Optimal Comfort and Wellbeing  Outcome: Ongoing, Progressing  Intervention: Provide Person-Centered Care  Recent Flowsheet Documentation  Taken 2/21/2024 2000 by Kj Mayes RN  Trust Relationship/Rapport: care explained  Goal: Readiness for Transition of Care  Outcome: Ongoing, Progressing     Problem: Anemia  Goal: Anemia Symptom Improvement  Outcome: Ongoing, Progressing  Intervention: Monitor and Manage Anemia  Recent Flowsheet Documentation  Taken 2/21/2024 2000 by Kj Mayes RN  Safety Promotion/Fall Prevention: safety round/check completed     Problem: Skin Injury Risk Increased  Goal: Skin Health and Integrity  Outcome: Ongoing, Progressing  Intervention: Optimize Skin Protection  Recent Flowsheet Documentation  Taken 2/21/2024 2000 by Kj Mayes RN  Head of Bed (HOB) Positioning: HOB elevated     Problem: Asthma Comorbidity  Goal:  Maintenance of Asthma Control  Outcome: Ongoing, Progressing     Problem: Behavioral Health Comorbidity  Goal: Maintenance of Behavioral Health Symptom Control  Outcome: Ongoing, Progressing     Problem: Diabetes Comorbidity  Goal: Blood Glucose Level Within Targeted Range  Outcome: Ongoing, Progressing     Problem: Hypertension Comorbidity  Goal: Blood Pressure in Desired Range  Outcome: Ongoing, Progressing     Problem: Pain Chronic (Persistent) (Comorbidity Management)  Goal: Acceptable Pain Control and Functional Ability  Outcome: Ongoing, Progressing     Problem: Fall Injury Risk  Goal: Absence of Fall and Fall-Related Injury  Outcome: Ongoing, Progressing  Intervention: Promote Injury-Free Environment  Recent Flowsheet Documentation  Taken 2/21/2024 2000 by Kj Mayes, RN  Safety Promotion/Fall Prevention: safety round/check completed   Goal Outcome Evaluation:  Plan of Care Reviewed With: patient        Progress: improving  Outcome Evaluation: Pt. A&Ox4. Resting in bed at this time. Pt. hoping to go home today. Pain meds. given per EMAR. Bed low and locked, call light w/ in reach. WCTM.

## 2024-02-22 NOTE — SIGNIFICANT NOTE
02/22/24 1212   Plan   Plan Patient discharging back to [facility] today. SW notified the facility.   Final Discharge Disposition Code 04 - intermediate care facility

## 2024-02-22 NOTE — PROGRESS NOTES
[facility]   Hospitalist Progress Note       Patient Name: Nic Nicolas  : 1965  MRN: [id number]  Primary Care Physician: Sheldon Carcamo MD  Date of admission: 2024  Today's Date: 2024  Room / Bed:   [facility]/1  Subjective   Chief Complaint: Anemia     Summary: Patient is 58-year-old male past medical history significant for liver cirrhosis secondary to Ruiz, hypertension, diabetes on insulin, recent admission for upper GI bleed secondary to gastric ulcer gastritis and duodenitis.  Patient presents to the ED for evaluation for worsening anemia noted on routine labs at nursing home.  In the emergency department patient was evaluated was noted to have worsening anemia with a hemoglobin of 6.9 has been admitted to the hospitalist service GI consulted started on Protonix drip started on octreotide drip transfused packed red blood cells underwent EGD showed chronic erosive gastritis with oozing of blood treated with APC showed duodenitis with oozing blood of the mucosa that stopped without any intervention showed healed scar of gastric cardia ulcer.  Been transition to twice daily Protonix 3 times daily octreotide diet being advanced.  Also checking iron profile B12 and folate levels will replace as needed.    Interval Followup: 2024    Resident of [facility].  Feels better.  Fatigue improved.  Hgb stable.  Hemodynamics stable.    No obvious bleeding.  No abd pain.  Denies any hematemesis, hemoptysis, epistaxis, melena, or hematochezia.      REVIEW OF SYSTEMS:   Fatigue  Objective   Temp:  [97.6 °F (36.4 °C)-98.2 °F (36.8 °C)] 97.6 °F (36.4 °C)  Heart Rate:  [65-74] 67  Resp:  [18-24] 18  BP: ()/(45-71) 147/67  PHYSICAL EXAM   CON: WN. WD. NAD.   NECK:  No thyromegaly. No stridor.   RESP:  CTA. No wheezes. No crackles.  No work of breathing or tachypnea.   CV:  Rhythm regular. Rate WNL. No murmur noted.  No edema.  GI:  Soft and nontender. Nondistended.    EXT: Peripheral pulses  intact.  No cyanosis.  PSYCH:  Alert. Oriented. Normal affect and mood.  NEURO:  No dysarthria or aphasia. No unilateral weakness or paresthesia.  SKIN: Chronic venous stasis changes lower extremities L>R  Results from last 7 days   Lab Units 02/22/24  0507 02/22/24  0017 02/21/24  1752 02/21/24  1147 02/21/24  0518 02/21/24  0029 02/20/24  1817 02/20/24  1253 02/20/24  0522 02/19/24  1628   WBC 10*3/mm3 3.81  --   --   --  4.52  --   --   --  3.69 4.01   HEMOGLOBIN g/dL 7.7* 7.7* 8.0* 7.7* 7.6* 7.7* 8.2*   < > 6.9* 6.9*   HEMATOCRIT % 26.0* 26.3* 27.5* 24.7* 25.2* 24.9* 27.6*   < > 23.4* 23.1*   PLATELETS 10*3/mm3 77*  --   --   --  80*  --   --   --  78* 92*    < > = values in this interval not displayed.     Results from last 7 days   Lab Units 02/22/24  0507 02/21/24  0518 02/20/24  0522 02/19/24  1628   SODIUM mmol/L 138 138 139 140   POTASSIUM mmol/L 3.9 4.3 4.2 4.1   CO2 mmol/L 24.3 23.6 24.7 25.2   CHLORIDE mmol/L 105 105 106 104   ANION GAP mmol/L 8.7 9.4 8.3 10.8   BUN mg/dL 16 20 21* 21*   CREATININE mg/dL 0.86 0.96 0.98 0.87   GLUCOSE mg/dL 158* 152* 123* 203*     Results from last 7 days   Lab Units 02/19/24  1628   INR  1.79*     COMPLEXITY OF DATA / DECISION MAKING     []  Moderate: One acute illness or mild exacerbation of chronic or 2 stable chronic or tx side effects   []  High:  Severe acute illness or severe exacerbation of chronic - potential for major debility / life threatening         I have personally reviewed the results from the time of this admission to 2/22/2024 09:58 EST:  []  Laboratory:  []  Microbiology: []  Radiology:  []  Telemetry:   []  Cardiology/Vascular:  []  Pathology:  []  Prior external records:  []  Independent historian provided additional details:      []  Discussed case with specialists:    []  Independent interpretation of ECG/Imaging etc:             []  Moderate: Rx management, low risk surgery, suboptimal social situation   []  High:  Rx with close monitoring for  toxicity, mod-high risk surgery, DNR decision, Comfort initiated, IV pain meds    Assessment / Plan   Assessment:    Acute blood loss anemia  Recurrent upper GI bleed  Chronic erosive gastritis with oozing blood noted treated with APC  Portal hypertensive gastropathy  History of duodenitis gastritis  Thrombocytopenia secondary to liver cirrhosis  Liver cirrhosis secondary to Ruiz  Mild to moderate ascites  History of asthma  Anxiety  Depression  Nursing home resident/[facility] facility     Plan:    Give IV iron.  DC to [facility] where he is a resident.  S/P PRBCs   Gastroenterology consult appreciated  Status post EGD 2/20 (Severe inflammation with hemorrhage, congestion, erosions, friability in the cardia/gastric body.  Status post fulguration with argon plasma coagulation)  PPI changed to oral formulation  Octreotide changed to subcu formulation  Diet being advanced  Continue rifaximin/lactulose  Avoiding anticoagulants/antiplatelets  Return to nursing facility when medically stabilized  Discussed plan with RN.  DVT prophylaxis:  Mechanical DVT prophylaxis orders are present.      CODE STATUS:      Level Of Support Discussed With: Patient  Code Status (Patient has no pulse and is not breathing): CPR (Attempt to Resuscitate)  Medical Interventions (Patient has pulse or is breathing): Full Support             Attending documentation:  I reviewed the above documentation and independently reviewed and rounded and evaluated the patient and discussed the care plan with SANDOR Goetz PA-C, I agree with his findings and plan as documented, what I have added to the care plan and modified is as follows in my documentation and my medical decision making and discharge plan; 58-year-old male hospitalized on 2/19/2024 for chief complaint and anemia, black tarry stools.  Abdominal pain.  GI consulted.  Underwent EGD after requiring 2 units of PRBC transfusions after hemoglobin failed to improve.  He had erosive gastritis  with oozing of the blood, treated with argon plasma coagulation.  He also had duodenitis using a blood was also seen and stopped during procedure.  He was transition from continuous octreotide infusion to 3 times daily basis, as well as Protonix transition to IV after procedure.  Bleeding subsided.  He tolerated oral intake.  He was discharged in hemodynamically stable condition after starting carvedilol 3.125 mg p.o. twice daily.  In addition to Carafate and Protonix twice daily.  Iron supplementation started.  May need iron infusions as outpatient.  Discharged in hemodynamically stable condition on 2/22/2024 to return to facility where he resides at.  Follow-up with GI in 4 to 6 weeks. Interval follow-up: Seen and examined on the day of discharge, no acute distress, no acute major night events, hemoglobin stable, no bleeding noted in addition to no abdominal pain.  Review of systems obtained and all systems reviewed and negative except weakness, fatigue.  Vitals reviewed, labs reviewed.  On physical exam, elderly appearing male older than stated age, laying in bed, with generalized fatigue, regular rate and rhythm, clear to auscultation bilaterally, soft distended abdomen with no tenderness in epigastric region.  Alert and oriented x 3.  Discharge plan reviewed, all questions answered, discussed with nurse at the bedside.  Total discharge time 45 minutes.  More than 95 % of the time of this patient's encounter was performed by me, this included face-to-face time, planning and coordinating, medical decision making and critical thinking personally done by me.    Electronically signed by Bandar Burch MD, 02/22/24, 5:49 PM EST.  Portions of this documentation were transcribed electronically from a voice recognition software.  I confirm all data accurately represents the service(s) I performed at today's visit.

## 2024-02-28 ENCOUNTER — OFFICE VISIT (OUTPATIENT)
Dept: GASTROENTEROLOGY | Facility: CLINIC | Age: 59
End: 2024-02-28
Payer: MEDICAID

## 2024-02-28 VITALS
SYSTOLIC BLOOD PRESSURE: 127 MMHG | HEART RATE: 81 BPM | WEIGHT: 305.1 LBS | DIASTOLIC BLOOD PRESSURE: 54 MMHG | BODY MASS INDEX: 46.24 KG/M2 | OXYGEN SATURATION: 98 % | HEIGHT: 68 IN

## 2024-02-28 DIAGNOSIS — D64.9 ANEMIA, UNSPECIFIED TYPE: ICD-10-CM

## 2024-02-28 DIAGNOSIS — R18.8 OTHER ASCITES: ICD-10-CM

## 2024-02-28 DIAGNOSIS — K76.6 PORTAL HYPERTENSION: ICD-10-CM

## 2024-02-28 DIAGNOSIS — K74.60 LIVER CIRRHOSIS SECONDARY TO NASH: Primary | ICD-10-CM

## 2024-02-28 DIAGNOSIS — K76.82 HEPATIC ENCEPHALOPATHY: ICD-10-CM

## 2024-02-28 DIAGNOSIS — K75.81 LIVER CIRRHOSIS SECONDARY TO NASH: Primary | ICD-10-CM

## 2024-02-28 DIAGNOSIS — D69.6 THROMBOCYTOPENIA: ICD-10-CM

## 2024-02-28 RX ORDER — SUCRALFATE 1 G/1
1 TABLET ORAL 4 TIMES DAILY
COMMUNITY

## 2024-02-28 RX ORDER — ALBUMIN (HUMAN) 12.5 G/50ML
25 SOLUTION INTRAVENOUS AS NEEDED
OUTPATIENT
Start: 2024-02-28

## 2024-02-28 RX ORDER — LACTULOSE 10 G/15ML
SOLUTION ORAL
COMMUNITY
Start: 2024-02-22

## 2024-02-28 RX ORDER — OXYCODONE HCL 10 MG/1
10 TABLET, FILM COATED, EXTENDED RELEASE ORAL EVERY 12 HOURS
COMMUNITY

## 2024-02-28 RX ORDER — ONDANSETRON 4 MG/1
4 TABLET, FILM COATED ORAL EVERY 8 HOURS PRN
Qty: 30 TABLET | Refills: 1 | Status: SHIPPED | OUTPATIENT
Start: 2024-02-28
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
## 2024-03-04 ENCOUNTER — HOSPITAL ENCOUNTER (OUTPATIENT)
Dept: INTERVENTIONAL RADIOLOGY/VASCULAR | Facility: HOSPITAL | Age: 59
Discharge: HOME | End: 2024-03-04
Payer: MEDICAID

## 2024-03-04 ENCOUNTER — LAB (OUTPATIENT)
Dept: LAB | Facility: HOSPITAL | Age: 59
End: 2024-03-04
Payer: MEDICAID

## 2024-03-04 VITALS
DIASTOLIC BLOOD PRESSURE: 71 MMHG | OXYGEN SATURATION: 96 % | RESPIRATION RATE: 16 BRPM | SYSTOLIC BLOOD PRESSURE: 138 MMHG | HEART RATE: 80 BPM

## 2024-03-04 DIAGNOSIS — D64.9 ANEMIA, UNSPECIFIED TYPE: ICD-10-CM

## 2024-03-04 DIAGNOSIS — K75.81 LIVER CIRRHOSIS SECONDARY TO NASH: ICD-10-CM

## 2024-03-04 DIAGNOSIS — K74.60 LIVER CIRRHOSIS SECONDARY TO NASH: ICD-10-CM

## 2024-03-04 DIAGNOSIS — D50.0 IRON DEFICIENCY ANEMIA DUE TO CHRONIC BLOOD LOSS: ICD-10-CM

## 2024-03-04 DIAGNOSIS — R18.8 OTHER ASCITES: ICD-10-CM

## 2024-03-04 LAB
ALBUMIN FLD-MCNC: 0.4 G/DL
ALBUMIN SERPL-MCNC: 3 G/DL (ref 3.5–5.2)
ALBUMIN/GLOB SERPL: 1 G/DL
ALP SERPL-CCNC: 114 U/L (ref 39–117)
ALT SERPL W P-5'-P-CCNC: 23 U/L (ref 1–41)
ANION GAP SERPL CALCULATED.3IONS-SCNC: 9.5 MMOL/L (ref 5–15)
APPEARANCE FLD: CLEAR
APTT PPP: 37.4 SECONDS (ref 24.2–34.2)
AST SERPL-CCNC: 37 U/L (ref 1–40)
BASOPHILS # BLD AUTO: 0.05 10*3/MM3 (ref 0–0.2)
BASOPHILS NFR BLD AUTO: 0.8 % (ref 0–1.5)
BILIRUB SERPL-MCNC: 1.4 MG/DL (ref 0–1.2)
BUN SERPL-MCNC: 21 MG/DL (ref 6–20)
BUN/CREAT SERPL: 19.1 (ref 7–25)
CALCIUM SPEC-SCNC: 8.7 MG/DL (ref 8.6–10.5)
CHLORIDE SERPL-SCNC: 103 MMOL/L (ref 98–107)
CO2 SERPL-SCNC: 25.5 MMOL/L (ref 22–29)
COLOR FLD: NORMAL
CREAT SERPL-MCNC: 1.1 MG/DL (ref 0.76–1.27)
DEPRECATED RDW RBC AUTO: 61.5 FL (ref 37–54)
EGFRCR SERPLBLD CKD-EPI 2021: 78.3 ML/MIN/1.73
EOSINOPHIL # BLD AUTO: 0.17 10*3/MM3 (ref 0–0.4)
EOSINOPHIL NFR BLD AUTO: 2.9 % (ref 0.3–6.2)
ERYTHROCYTE [DISTWIDTH] IN BLOOD BY AUTOMATED COUNT: 21.5 % (ref 12.3–15.4)
FERRITIN SERPL-MCNC: 33.24 NG/ML (ref 30–400)
GLOBULIN UR ELPH-MCNC: 3 GM/DL
GLUCOSE SERPL-MCNC: 168 MG/DL (ref 65–99)
HCT VFR BLD AUTO: 26.2 % (ref 37.5–51)
HGB BLD-MCNC: 8.1 G/DL (ref 13–17.7)
IMM GRANULOCYTES # BLD AUTO: 0.01 10*3/MM3 (ref 0–0.05)
IMM GRANULOCYTES NFR BLD AUTO: 0.2 % (ref 0–0.5)
IRON 24H UR-MRATE: 83 MCG/DL (ref 59–158)
IRON SATN MFR SERPL: 20 % (ref 20–50)
LYMPHOCYTES # BLD AUTO: 0.88 10*3/MM3 (ref 0.7–3.1)
LYMPHOCYTES NFR BLD AUTO: 14.8 % (ref 19.6–45.3)
LYMPHOCYTES NFR FLD MANUAL: 19 %
MACROPHAGE FLUID: 54 %
MCH RBC QN AUTO: 24.3 PG (ref 26.6–33)
MCHC RBC AUTO-ENTMCNC: 30.9 G/DL (ref 31.5–35.7)
MCV RBC AUTO: 78.7 FL (ref 79–97)
MONOCYTES # BLD AUTO: 0.71 10*3/MM3 (ref 0.1–0.9)
MONOCYTES NFR BLD AUTO: 12 % (ref 5–12)
MONOCYTES NFR FLD: 0 %
NEUTROPHILS NFR BLD AUTO: 4.12 10*3/MM3 (ref 1.7–7)
NEUTROPHILS NFR BLD AUTO: 69.3 % (ref 42.7–76)
NEUTROPHILS NFR FLD MANUAL: 27 %
NRBC BLD AUTO-RTO: 0 /100 WBC (ref 0–0.2)
NUC CELL # FLD: 289 /MM3
PLATELET # BLD AUTO: 95 10*3/MM3 (ref 140–450)
PMV BLD AUTO: 10.2 FL (ref 6–12)
POTASSIUM SERPL-SCNC: 4.3 MMOL/L (ref 3.5–5.2)
PROT SERPL-MCNC: 6 G/DL (ref 6–8.5)
RBC # BLD AUTO: 3.33 10*6/MM3 (ref 4.14–5.8)
RBC # FLD AUTO: <2000 /MM3
RBC MORPH BLD: NORMAL
SMALL PLATELETS BLD QL SMEAR: NORMAL
SODIUM SERPL-SCNC: 138 MMOL/L (ref 136–145)
TIBC SERPL-MCNC: 414 MCG/DL (ref 298–536)
TRANSFERRIN SERPL-MCNC: 278 MG/DL (ref 200–360)
WBC MORPH BLD: NORMAL
WBC NRBC COR # BLD AUTO: 5.94 10*3/MM3 (ref 3.4–10.8)

## 2024-03-04 PROCEDURE — 89051 BODY FLUID CELL COUNT: CPT

## 2024-03-04 PROCEDURE — 87070 CULTURE OTHR SPECIMN AEROBIC: CPT

## 2024-03-04 PROCEDURE — 82042 OTHER SOURCE ALBUMIN QUAN EA: CPT

## 2024-03-04 PROCEDURE — 36415 COLL VENOUS BLD VENIPUNCTURE: CPT

## 2024-03-04 PROCEDURE — 84466 ASSAY OF TRANSFERRIN: CPT

## 2024-03-04 PROCEDURE — 80053 COMPREHEN METABOLIC PANEL: CPT

## 2024-03-04 PROCEDURE — 76942 ECHO GUIDE FOR BIOPSY: CPT

## 2024-03-04 PROCEDURE — 88108 CYTOPATH CONCENTRATE TECH: CPT

## 2024-03-04 PROCEDURE — 87205 SMEAR GRAM STAIN: CPT

## 2024-03-04 PROCEDURE — 85007 BL SMEAR W/DIFF WBC COUNT: CPT

## 2024-03-04 PROCEDURE — 85730 THROMBOPLASTIN TIME PARTIAL: CPT

## 2024-03-04 PROCEDURE — 83540 ASSAY OF IRON: CPT

## 2024-03-04 PROCEDURE — 82728 ASSAY OF FERRITIN: CPT

## 2024-03-04 PROCEDURE — 85025 COMPLETE CBC W/AUTO DIFF WBC: CPT

## 2024-03-04 RX ORDER — LIDOCAINE HYDROCHLORIDE 20 MG/ML
10 INJECTION, SOLUTION INFILTRATION; PERINEURAL ONCE
Status: COMPLETED | OUTPATIENT
Start: 2024-03-04 | End: 2024-03-04

## 2024-03-04 RX ADMIN — LIDOCAINE HYDROCHLORIDE 10 ML: 20 INJECTION, SOLUTION INFILTRATION; PERINEURAL at 10:55

## 2024-03-04 RX ADMIN — SODIUM BICARBONATE 1 ML: 84 INJECTION, SOLUTION INTRAVENOUS at 10:55

## 2024-03-05 DIAGNOSIS — D64.9 ANEMIA, UNSPECIFIED TYPE: Primary | ICD-10-CM

## 2024-03-05 LAB
CYTO UR: NORMAL
LAB AP CASE REPORT: NORMAL
LAB AP CLINICAL INFORMATION: NORMAL
PATH REPORT.FINAL DX SPEC: NORMAL
PATH REPORT.GROSS SPEC: NORMAL

## 2024-03-07 LAB
BACTERIA FLD CULT: NORMAL
GRAM STN SPEC: NORMAL
GRAM STN SPEC: NORMAL

## 2024-03-08 ENCOUNTER — TELEPHONE (OUTPATIENT)
Dept: GASTROENTEROLOGY | Facility: CLINIC | Age: 59
End: 2024-03-08
Payer: MEDICAID

## 2024-03-08 NOTE — TELEPHONE ENCOUNTER
Called patients sister per patients request   Aware of results.     Will call with any questions or concerns

## 2024-03-08 NOTE — TELEPHONE ENCOUNTER
Patient is a resident at [facility].     S/w patients nurse.     Aware of results. Will repeat CBC in 1 month.   Will call with any questions or concerns

## 2024-03-08 NOTE — TELEPHONE ENCOUNTER
----- Message from BESSIE Francois sent at 3/5/2024  7:59 AM EST -----  Hemoglobin has improved since discharge from hospital.  Hemoglobin previously 7.7 and is now 8.1.  Continue oral iron as previously described.  Order for repeat CBC in 1 month to continue trending.

## 2024-03-21 ENCOUNTER — HOSPITAL ENCOUNTER (INPATIENT)
Facility: HOSPITAL | Age: 59
LOS: 4 days | Discharge: NURSING, REHAB OR LONG-TERM CARE | DRG: 378 | End: 2024-03-25
Attending: EMERGENCY MEDICINE | Admitting: HOSPITALIST
Payer: MEDICAID

## 2024-03-21 DIAGNOSIS — D64.9 ANEMIA, UNSPECIFIED TYPE: ICD-10-CM

## 2024-03-21 DIAGNOSIS — K27.4 GASTROINTESTINAL HEMORRHAGE ASSOCIATED WITH PEPTIC ULCER: ICD-10-CM

## 2024-03-21 DIAGNOSIS — K92.2 UPPER GI BLEED: Primary | ICD-10-CM

## 2024-03-21 LAB
ABO GROUP BLD: NORMAL
ALBUMIN SERPL-MCNC: 2.9 G/DL (ref 3.5–5.2)
ALBUMIN/GLOB SERPL: 1 G/DL
ALP SERPL-CCNC: 108 U/L (ref 39–117)
ALT SERPL W P-5'-P-CCNC: 18 U/L (ref 1–41)
ANION GAP SERPL CALCULATED.3IONS-SCNC: 10.5 MMOL/L (ref 5–15)
APTT PPP: 36.8 SECONDS (ref 78–95.9)
AST SERPL-CCNC: 36 U/L (ref 1–40)
BASOPHILS # BLD AUTO: 0.03 10*3/MM3 (ref 0–0.2)
BASOPHILS NFR BLD AUTO: 0.8 % (ref 0–1.5)
BILIRUB SERPL-MCNC: 0.9 MG/DL (ref 0–1.2)
BILIRUB UR QL STRIP: NEGATIVE
BLD GP AB SCN SERPL QL: NEGATIVE
BUN SERPL-MCNC: 20 MG/DL (ref 6–20)
BUN/CREAT SERPL: 20.8 (ref 7–25)
CALCIUM SPEC-SCNC: 8 MG/DL (ref 8.6–10.5)
CHLORIDE SERPL-SCNC: 103 MMOL/L (ref 98–107)
CLARITY UR: CLEAR
CO2 SERPL-SCNC: 22.5 MMOL/L (ref 22–29)
COLOR UR: YELLOW
CREAT SERPL-MCNC: 0.96 MG/DL (ref 0.76–1.27)
D-LACTATE SERPL-SCNC: 1 MMOL/L (ref 0.5–2)
DEPRECATED RDW RBC AUTO: 58.6 FL (ref 37–54)
EGFRCR SERPLBLD CKD-EPI 2021: 92.2 ML/MIN/1.73
EOSINOPHIL # BLD AUTO: 0.15 10*3/MM3 (ref 0–0.4)
EOSINOPHIL NFR BLD AUTO: 3.8 % (ref 0.3–6.2)
ERYTHROCYTE [DISTWIDTH] IN BLOOD BY AUTOMATED COUNT: 20 % (ref 12.3–15.4)
GLOBULIN UR ELPH-MCNC: 2.9 GM/DL
GLUCOSE SERPL-MCNC: 256 MG/DL (ref 65–99)
GLUCOSE UR STRIP-MCNC: NEGATIVE MG/DL
HCT VFR BLD AUTO: 24.2 % (ref 37.5–51)
HEMOCCULT STL QL IA: POSITIVE
HGB BLD-MCNC: 7.3 G/DL (ref 13–17.7)
HGB UR QL STRIP.AUTO: NEGATIVE
HOLD SPECIMEN: NORMAL
HOLD SPECIMEN: NORMAL
IMM GRANULOCYTES # BLD AUTO: 0.01 10*3/MM3 (ref 0–0.05)
IMM GRANULOCYTES NFR BLD AUTO: 0.3 % (ref 0–0.5)
INR PPP: 1.75 (ref 0.86–1.15)
KETONES UR QL STRIP: NEGATIVE
LEUKOCYTE ESTERASE UR QL STRIP.AUTO: NEGATIVE
LYMPHOCYTES # BLD AUTO: 0.66 10*3/MM3 (ref 0.7–3.1)
LYMPHOCYTES NFR BLD AUTO: 16.8 % (ref 19.6–45.3)
MCH RBC QN AUTO: 24.2 PG (ref 26.6–33)
MCHC RBC AUTO-ENTMCNC: 30.2 G/DL (ref 31.5–35.7)
MCV RBC AUTO: 80.1 FL (ref 79–97)
MONOCYTES # BLD AUTO: 0.34 10*3/MM3 (ref 0.1–0.9)
MONOCYTES NFR BLD AUTO: 8.7 % (ref 5–12)
NEUTROPHILS NFR BLD AUTO: 2.73 10*3/MM3 (ref 1.7–7)
NEUTROPHILS NFR BLD AUTO: 69.6 % (ref 42.7–76)
NITRITE UR QL STRIP: NEGATIVE
NRBC BLD AUTO-RTO: 0 /100 WBC (ref 0–0.2)
PH UR STRIP.AUTO: 6 [PH] (ref 5–8)
PLATELET # BLD AUTO: 82 10*3/MM3 (ref 140–450)
PMV BLD AUTO: 10.7 FL (ref 6–12)
POTASSIUM SERPL-SCNC: 4 MMOL/L (ref 3.5–5.2)
PROT SERPL-MCNC: 5.8 G/DL (ref 6–8.5)
PROT UR QL STRIP: NEGATIVE
PROTHROMBIN TIME: 20.8 SECONDS (ref 11.8–14.9)
RBC # BLD AUTO: 3.02 10*6/MM3 (ref 4.14–5.8)
RH BLD: POSITIVE
SODIUM SERPL-SCNC: 136 MMOL/L (ref 136–145)
SP GR UR STRIP: 1.01 (ref 1–1.03)
T&S EXPIRATION DATE: NORMAL
UROBILINOGEN UR QL STRIP: NORMAL
WBC NRBC COR # BLD AUTO: 3.92 10*3/MM3 (ref 3.4–10.8)
WHOLE BLOOD HOLD COAG: NORMAL
WHOLE BLOOD HOLD SPECIMEN: NORMAL

## 2024-03-21 PROCEDURE — 86900 BLOOD TYPING SEROLOGIC ABO: CPT | Performed by: EMERGENCY MEDICINE

## 2024-03-21 PROCEDURE — 81003 URINALYSIS AUTO W/O SCOPE: CPT | Performed by: EMERGENCY MEDICINE

## 2024-03-21 PROCEDURE — 85610 PROTHROMBIN TIME: CPT | Performed by: EMERGENCY MEDICINE

## 2024-03-21 PROCEDURE — 85025 COMPLETE CBC W/AUTO DIFF WBC: CPT | Performed by: EMERGENCY MEDICINE

## 2024-03-21 PROCEDURE — 82274 ASSAY TEST FOR BLOOD FECAL: CPT | Performed by: EMERGENCY MEDICINE

## 2024-03-21 PROCEDURE — 99291 CRITICAL CARE FIRST HOUR: CPT

## 2024-03-21 PROCEDURE — 86901 BLOOD TYPING SEROLOGIC RH(D): CPT | Performed by: EMERGENCY MEDICINE

## 2024-03-21 PROCEDURE — P9016 RBC LEUKOCYTES REDUCED: HCPCS

## 2024-03-21 PROCEDURE — 86923 COMPATIBILITY TEST ELECTRIC: CPT

## 2024-03-21 PROCEDURE — 99222 1ST HOSP IP/OBS MODERATE 55: CPT | Performed by: INTERNAL MEDICINE

## 2024-03-21 PROCEDURE — 80053 COMPREHEN METABOLIC PANEL: CPT | Performed by: EMERGENCY MEDICINE

## 2024-03-21 PROCEDURE — 25010000002 CEFTRIAXONE PER 250 MG: Performed by: HOSPITALIST

## 2024-03-21 PROCEDURE — 99223 1ST HOSP IP/OBS HIGH 75: CPT | Performed by: HOSPITALIST

## 2024-03-21 PROCEDURE — 25010000002 HYDROMORPHONE 1 MG/ML SOLUTION: Performed by: HOSPITALIST

## 2024-03-21 PROCEDURE — 83605 ASSAY OF LACTIC ACID: CPT | Performed by: HOSPITALIST

## 2024-03-21 PROCEDURE — 36430 TRANSFUSION BLD/BLD COMPNT: CPT

## 2024-03-21 PROCEDURE — 86900 BLOOD TYPING SEROLOGIC ABO: CPT

## 2024-03-21 PROCEDURE — 25010000002 HYDROMORPHONE 1 MG/ML SOLUTION: Performed by: EMERGENCY MEDICINE

## 2024-03-21 PROCEDURE — 87040 BLOOD CULTURE FOR BACTERIA: CPT | Performed by: HOSPITALIST

## 2024-03-21 PROCEDURE — 86850 RBC ANTIBODY SCREEN: CPT | Performed by: EMERGENCY MEDICINE

## 2024-03-21 PROCEDURE — 36415 COLL VENOUS BLD VENIPUNCTURE: CPT

## 2024-03-21 PROCEDURE — 85730 THROMBOPLASTIN TIME PARTIAL: CPT | Performed by: EMERGENCY MEDICINE

## 2024-03-21 RX ORDER — ACETAMINOPHEN 325 MG/1
650 TABLET, CHEWABLE ORAL EVERY 6 HOURS PRN
COMMUNITY

## 2024-03-21 RX ORDER — CETIRIZINE HYDROCHLORIDE 10 MG/1
10 TABLET ORAL DAILY
Status: DISCONTINUED | OUTPATIENT
Start: 2024-03-21 | End: 2024-03-25 | Stop reason: HOSPADM

## 2024-03-21 RX ORDER — CARVEDILOL 6.25 MG/1
6.25 TABLET ORAL 2 TIMES DAILY WITH MEALS
Status: DISCONTINUED | OUTPATIENT
Start: 2024-03-21 | End: 2024-03-25 | Stop reason: HOSPADM

## 2024-03-21 RX ORDER — MELATONIN
1000 DAILY
COMMUNITY

## 2024-03-21 RX ORDER — BUSPIRONE HYDROCHLORIDE 7.5 MG/1
7.5 TABLET ORAL 3 TIMES DAILY
Status: DISCONTINUED | OUTPATIENT
Start: 2024-03-21 | End: 2024-03-25 | Stop reason: HOSPADM

## 2024-03-21 RX ORDER — SIMETHICONE 80 MG
80 TABLET,CHEWABLE ORAL
COMMUNITY

## 2024-03-21 RX ORDER — ALBUTEROL SULFATE 90 UG/1
2 AEROSOL, METERED RESPIRATORY (INHALATION) EVERY 4 HOURS PRN
Status: DISCONTINUED | OUTPATIENT
Start: 2024-03-21 | End: 2024-03-25 | Stop reason: HOSPADM

## 2024-03-21 RX ORDER — LACTULOSE 10 G/15ML
68 SOLUTION ORAL 2 TIMES DAILY
Status: DISCONTINUED | OUTPATIENT
Start: 2024-03-21 | End: 2024-03-24

## 2024-03-21 RX ORDER — DIPHENHYDRAMINE HCL 25 MG
25 CAPSULE ORAL EVERY 6 HOURS PRN
Status: DISCONTINUED | OUTPATIENT
Start: 2024-03-21 | End: 2024-03-25 | Stop reason: HOSPADM

## 2024-03-21 RX ORDER — OXYCODONE HYDROCHLORIDE 5 MG/1
10 TABLET ORAL EVERY 6 HOURS PRN
Status: DISCONTINUED | OUTPATIENT
Start: 2024-03-21 | End: 2024-03-25 | Stop reason: HOSPADM

## 2024-03-21 RX ORDER — SERTRALINE HYDROCHLORIDE 100 MG/1
100 TABLET, FILM COATED ORAL NIGHTLY
Status: DISCONTINUED | OUTPATIENT
Start: 2024-03-21 | End: 2024-03-25 | Stop reason: HOSPADM

## 2024-03-21 RX ORDER — SIMETHICONE 80 MG
80 TABLET,CHEWABLE ORAL
Status: DISCONTINUED | OUTPATIENT
Start: 2024-03-22 | End: 2024-03-25 | Stop reason: HOSPADM

## 2024-03-21 RX ORDER — ROPINIROLE 1 MG/1
1 TABLET, FILM COATED ORAL NIGHTLY
Status: DISCONTINUED | OUTPATIENT
Start: 2024-03-21 | End: 2024-03-25 | Stop reason: HOSPADM

## 2024-03-21 RX ORDER — IPRATROPIUM BROMIDE AND ALBUTEROL SULFATE 2.5; .5 MG/3ML; MG/3ML
3 SOLUTION RESPIRATORY (INHALATION) EVERY 4 HOURS PRN
Status: DISCONTINUED | OUTPATIENT
Start: 2024-03-21 | End: 2024-03-25 | Stop reason: HOSPADM

## 2024-03-21 RX ORDER — SPIRONOLACTONE 25 MG/1
100 TABLET ORAL 2 TIMES DAILY
Status: DISCONTINUED | OUTPATIENT
Start: 2024-03-21 | End: 2024-03-25 | Stop reason: HOSPADM

## 2024-03-21 RX ORDER — OXYCODONE HYDROCHLORIDE 10 MG/1
10 TABLET ORAL EVERY 6 HOURS PRN
COMMUNITY

## 2024-03-21 RX ORDER — FUROSEMIDE 40 MG/1
40 TABLET ORAL 2 TIMES DAILY
Status: DISCONTINUED | OUTPATIENT
Start: 2024-03-21 | End: 2024-03-25 | Stop reason: HOSPADM

## 2024-03-21 RX ORDER — BUDESONIDE 0.5 MG/2ML
0.5 INHALANT ORAL
Status: DISCONTINUED | OUTPATIENT
Start: 2024-03-21 | End: 2024-03-25 | Stop reason: HOSPADM

## 2024-03-21 RX ORDER — MELATONIN
1000 DAILY
Status: DISCONTINUED | OUTPATIENT
Start: 2024-03-22 | End: 2024-03-25 | Stop reason: HOSPADM

## 2024-03-21 RX ORDER — PANTOPRAZOLE SODIUM 40 MG/10ML
80 INJECTION, POWDER, LYOPHILIZED, FOR SOLUTION INTRAVENOUS ONCE
Status: COMPLETED | OUTPATIENT
Start: 2024-03-21 | End: 2024-03-21

## 2024-03-21 RX ORDER — NYSTATIN 100000 [USP'U]/G
1 POWDER TOPICAL 2 TIMES DAILY
Status: DISCONTINUED | OUTPATIENT
Start: 2024-03-22 | End: 2024-03-22

## 2024-03-21 RX ADMIN — PANTOPRAZOLE SODIUM 80 MG: 40 INJECTION, POWDER, FOR SOLUTION INTRAVENOUS at 12:32

## 2024-03-21 RX ADMIN — SODIUM CHLORIDE 8 MG/HR: 900 INJECTION INTRAVENOUS at 12:33

## 2024-03-21 RX ADMIN — HYDROMORPHONE HYDROCHLORIDE 0.5 MG: 1 INJECTION, SOLUTION INTRAMUSCULAR; INTRAVENOUS; SUBCUTANEOUS at 15:03

## 2024-03-21 RX ADMIN — HYDROMORPHONE HYDROCHLORIDE 0.5 MG: 1 INJECTION, SOLUTION INTRAMUSCULAR; INTRAVENOUS; SUBCUTANEOUS at 17:41

## 2024-03-21 RX ADMIN — CEFTRIAXONE SODIUM 2000 MG: 2 INJECTION, POWDER, FOR SOLUTION INTRAMUSCULAR; INTRAVENOUS at 16:28

## 2024-03-21 RX ADMIN — HYDROMORPHONE HYDROCHLORIDE 0.5 MG: 1 INJECTION, SOLUTION INTRAMUSCULAR; INTRAVENOUS; SUBCUTANEOUS at 20:03
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
## 2024-03-22 ENCOUNTER — ANESTHESIA (OUTPATIENT)
Dept: GASTROENTEROLOGY | Facility: HOSPITAL | Age: 59
End: 2024-03-22
Payer: MEDICAID

## 2024-03-22 ENCOUNTER — ANESTHESIA EVENT (OUTPATIENT)
Dept: GASTROENTEROLOGY | Facility: HOSPITAL | Age: 59
End: 2024-03-22
Payer: MEDICAID

## 2024-03-22 LAB
ANION GAP SERPL CALCULATED.3IONS-SCNC: 8.2 MMOL/L (ref 5–15)
BASOPHILS # BLD AUTO: 0.04 10*3/MM3 (ref 0–0.2)
BASOPHILS NFR BLD AUTO: 0.8 % (ref 0–1.5)
BUN SERPL-MCNC: 18 MG/DL (ref 6–20)
BUN/CREAT SERPL: 19.4 (ref 7–25)
CALCIUM SPEC-SCNC: 8.1 MG/DL (ref 8.6–10.5)
CHLORIDE SERPL-SCNC: 105 MMOL/L (ref 98–107)
CO2 SERPL-SCNC: 22.8 MMOL/L (ref 22–29)
CREAT SERPL-MCNC: 0.93 MG/DL (ref 0.76–1.27)
DEPRECATED RDW RBC AUTO: 57.9 FL (ref 37–54)
EGFRCR SERPLBLD CKD-EPI 2021: 95.8 ML/MIN/1.73
EOSINOPHIL # BLD AUTO: 0.12 10*3/MM3 (ref 0–0.4)
EOSINOPHIL NFR BLD AUTO: 2.5 % (ref 0.3–6.2)
ERYTHROCYTE [DISTWIDTH] IN BLOOD BY AUTOMATED COUNT: 19.2 % (ref 12.3–15.4)
GLUCOSE BLDC GLUCOMTR-MCNC: 173 MG/DL (ref 70–99)
GLUCOSE BLDC GLUCOMTR-MCNC: 203 MG/DL (ref 70–99)
GLUCOSE BLDC GLUCOMTR-MCNC: 206 MG/DL (ref 70–99)
GLUCOSE BLDC GLUCOMTR-MCNC: 223 MG/DL (ref 70–99)
GLUCOSE BLDC GLUCOMTR-MCNC: 261 MG/DL (ref 70–99)
GLUCOSE SERPL-MCNC: 187 MG/DL (ref 65–99)
HCT VFR BLD AUTO: 24.9 % (ref 37.5–51)
HGB BLD-MCNC: 7.6 G/DL (ref 13–17.7)
IMM GRANULOCYTES # BLD AUTO: 0.02 10*3/MM3 (ref 0–0.05)
IMM GRANULOCYTES NFR BLD AUTO: 0.4 % (ref 0–0.5)
LYMPHOCYTES # BLD AUTO: 0.73 10*3/MM3 (ref 0.7–3.1)
LYMPHOCYTES NFR BLD AUTO: 15.5 % (ref 19.6–45.3)
MCH RBC QN AUTO: 24.8 PG (ref 26.6–33)
MCHC RBC AUTO-ENTMCNC: 30.5 G/DL (ref 31.5–35.7)
MCV RBC AUTO: 81.4 FL (ref 79–97)
MONOCYTES # BLD AUTO: 0.45 10*3/MM3 (ref 0.1–0.9)
MONOCYTES NFR BLD AUTO: 9.5 % (ref 5–12)
NEUTROPHILS NFR BLD AUTO: 3.36 10*3/MM3 (ref 1.7–7)
NEUTROPHILS NFR BLD AUTO: 71.3 % (ref 42.7–76)
NRBC BLD AUTO-RTO: 0 /100 WBC (ref 0–0.2)
PLATELET # BLD AUTO: 79 10*3/MM3 (ref 140–450)
PMV BLD AUTO: 11.1 FL (ref 6–12)
POTASSIUM SERPL-SCNC: 4.3 MMOL/L (ref 3.5–5.2)
RBC # BLD AUTO: 3.06 10*6/MM3 (ref 4.14–5.8)
SODIUM SERPL-SCNC: 136 MMOL/L (ref 136–145)
WBC NRBC COR # BLD AUTO: 4.72 10*3/MM3 (ref 3.4–10.8)

## 2024-03-22 PROCEDURE — 82948 REAGENT STRIP/BLOOD GLUCOSE: CPT

## 2024-03-22 PROCEDURE — 25010000002 CEFTRIAXONE PER 250 MG: Performed by: INTERNAL MEDICINE

## 2024-03-22 PROCEDURE — 94640 AIRWAY INHALATION TREATMENT: CPT

## 2024-03-22 PROCEDURE — 94799 UNLISTED PULMONARY SVC/PX: CPT

## 2024-03-22 PROCEDURE — 0W3P8ZZ CONTROL BLEEDING IN GASTROINTESTINAL TRACT, VIA NATURAL OR ARTIFICIAL OPENING ENDOSCOPIC: ICD-10-PCS | Performed by: INTERNAL MEDICINE

## 2024-03-22 PROCEDURE — 85025 COMPLETE CBC W/AUTO DIFF WBC: CPT | Performed by: HOSPITALIST

## 2024-03-22 PROCEDURE — 25010000002 ONDANSETRON PER 1 MG: Performed by: PHYSICIAN ASSISTANT

## 2024-03-22 PROCEDURE — 25810000003 SODIUM CHLORIDE 0.9 % SOLUTION: Performed by: NURSE ANESTHETIST, CERTIFIED REGISTERED

## 2024-03-22 PROCEDURE — 63710000001 INSULIN DETEMIR PER 5 UNITS: Performed by: INTERNAL MEDICINE

## 2024-03-22 PROCEDURE — 43255 EGD CONTROL BLEEDING ANY: CPT | Performed by: INTERNAL MEDICINE

## 2024-03-22 PROCEDURE — 25810000003 SODIUM CHLORIDE 0.9 % SOLUTION: Performed by: HOSPITALIST

## 2024-03-22 PROCEDURE — 94761 N-INVAS EAR/PLS OXIMETRY MLT: CPT

## 2024-03-22 PROCEDURE — 25010000002 NA FERRIC GLUC CPLX PER 12.5 MG: Performed by: HOSPITALIST

## 2024-03-22 PROCEDURE — 25010000002 HYDROMORPHONE 1 MG/ML SOLUTION: Performed by: INTERNAL MEDICINE

## 2024-03-22 PROCEDURE — 25010000002 HYDROMORPHONE 1 MG/ML SOLUTION: Performed by: HOSPITALIST

## 2024-03-22 PROCEDURE — 25010000002 PROPOFOL 10 MG/ML EMULSION: Performed by: NURSE ANESTHETIST, CERTIFIED REGISTERED

## 2024-03-22 PROCEDURE — 80048 BASIC METABOLIC PNL TOTAL CA: CPT | Performed by: HOSPITALIST

## 2024-03-22 PROCEDURE — 25010000002 FENTANYL CITRATE (PF) 50 MCG/ML SOLUTION: Performed by: NURSE ANESTHETIST, CERTIFIED REGISTERED

## 2024-03-22 PROCEDURE — 99233 SBSQ HOSP IP/OBS HIGH 50: CPT | Performed by: HOSPITALIST

## 2024-03-22 RX ORDER — NYSTATIN 100000 [USP'U]/G
1 POWDER TOPICAL 2 TIMES DAILY
Status: DISCONTINUED | OUTPATIENT
Start: 2024-03-22 | End: 2024-03-25 | Stop reason: HOSPADM

## 2024-03-22 RX ORDER — ONDANSETRON 2 MG/ML
4 INJECTION INTRAMUSCULAR; INTRAVENOUS EVERY 4 HOURS PRN
Status: DISCONTINUED | OUTPATIENT
Start: 2024-03-22 | End: 2024-03-25 | Stop reason: HOSPADM

## 2024-03-22 RX ORDER — FENTANYL CITRATE 50 UG/ML
INJECTION, SOLUTION INTRAMUSCULAR; INTRAVENOUS AS NEEDED
Status: DISCONTINUED | OUTPATIENT
Start: 2024-03-22 | End: 2024-03-22 | Stop reason: SURG

## 2024-03-22 RX ORDER — LIDOCAINE HYDROCHLORIDE 20 MG/ML
INJECTION, SOLUTION EPIDURAL; INFILTRATION; INTRACAUDAL; PERINEURAL AS NEEDED
Status: DISCONTINUED | OUTPATIENT
Start: 2024-03-22 | End: 2024-03-22 | Stop reason: SURG

## 2024-03-22 RX ORDER — PANTOPRAZOLE SODIUM 40 MG/1
40 TABLET, DELAYED RELEASE ORAL
Status: DISCONTINUED | OUTPATIENT
Start: 2024-03-23 | End: 2024-03-25 | Stop reason: HOSPADM

## 2024-03-22 RX ORDER — SODIUM CHLORIDE 9 MG/ML
INJECTION, SOLUTION INTRAVENOUS CONTINUOUS PRN
Status: DISCONTINUED | OUTPATIENT
Start: 2024-03-22 | End: 2024-03-22 | Stop reason: SURG

## 2024-03-22 RX ORDER — ONDANSETRON 2 MG/ML
4 INJECTION INTRAMUSCULAR; INTRAVENOUS ONCE
Status: DISCONTINUED | OUTPATIENT
Start: 2024-03-22 | End: 2024-03-22 | Stop reason: HOSPADM

## 2024-03-22 RX ORDER — FAMOTIDINE 10 MG/ML
20 INJECTION, SOLUTION INTRAVENOUS ONCE
Status: COMPLETED | OUTPATIENT
Start: 2024-03-22 | End: 2024-03-22

## 2024-03-22 RX ORDER — PROPOFOL 10 MG/ML
VIAL (ML) INTRAVENOUS AS NEEDED
Status: DISCONTINUED | OUTPATIENT
Start: 2024-03-22 | End: 2024-03-22 | Stop reason: SURG

## 2024-03-22 RX ORDER — SODIUM CHLORIDE, SODIUM LACTATE, POTASSIUM CHLORIDE, CALCIUM CHLORIDE 600; 310; 30; 20 MG/100ML; MG/100ML; MG/100ML; MG/100ML
30 INJECTION, SOLUTION INTRAVENOUS CONTINUOUS
Status: DISCONTINUED | OUTPATIENT
Start: 2024-03-22 | End: 2024-03-22

## 2024-03-22 RX ADMIN — INSULIN DETEMIR 30 UNITS: 100 INJECTION, SOLUTION SUBCUTANEOUS at 20:14

## 2024-03-22 RX ADMIN — HYDROMORPHONE HYDROCHLORIDE 0.5 MG: 1 INJECTION, SOLUTION INTRAMUSCULAR; INTRAVENOUS; SUBCUTANEOUS at 12:03

## 2024-03-22 RX ADMIN — SIMETHICONE 80 MG: 80 TABLET, CHEWABLE ORAL at 11:12

## 2024-03-22 RX ADMIN — HYDROMORPHONE HYDROCHLORIDE 0.5 MG: 1 INJECTION, SOLUTION INTRAMUSCULAR; INTRAVENOUS; SUBCUTANEOUS at 00:19

## 2024-03-22 RX ADMIN — CARVEDILOL 6.25 MG: 6.25 TABLET, FILM COATED ORAL at 17:00

## 2024-03-22 RX ADMIN — RIFAXIMIN 550 MG: 550 TABLET ORAL at 00:19

## 2024-03-22 RX ADMIN — SIMETHICONE 80 MG: 80 TABLET, CHEWABLE ORAL at 08:14

## 2024-03-22 RX ADMIN — ROPINIROLE HYDROCHLORIDE 1 MG: 1 TABLET, FILM COATED ORAL at 20:14

## 2024-03-22 RX ADMIN — FENTANYL CITRATE 25 MCG: 50 INJECTION, SOLUTION INTRAMUSCULAR; INTRAVENOUS at 13:15

## 2024-03-22 RX ADMIN — SPIRONOLACTONE 100 MG: 25 TABLET ORAL at 20:14

## 2024-03-22 RX ADMIN — BUSPIRONE HYDROCHLORIDE 7.5 MG: 7.5 TABLET ORAL at 20:14

## 2024-03-22 RX ADMIN — FUROSEMIDE 40 MG: 40 TABLET ORAL at 20:14

## 2024-03-22 RX ADMIN — LIDOCAINE HYDROCHLORIDE 100 MG: 20 INJECTION, SOLUTION INTRAVENOUS at 13:15

## 2024-03-22 RX ADMIN — HYDROMORPHONE HYDROCHLORIDE 0.5 MG: 1 INJECTION, SOLUTION INTRAMUSCULAR; INTRAVENOUS; SUBCUTANEOUS at 20:13

## 2024-03-22 RX ADMIN — FUROSEMIDE 40 MG: 40 TABLET ORAL at 08:14

## 2024-03-22 RX ADMIN — BUSPIRONE HYDROCHLORIDE 7.5 MG: 7.5 TABLET ORAL at 16:41

## 2024-03-22 RX ADMIN — ONDANSETRON 4 MG: 2 INJECTION INTRAMUSCULAR; INTRAVENOUS at 08:14

## 2024-03-22 RX ADMIN — BUDESONIDE 0.5 MG: 0.5 INHALANT RESPIRATORY (INHALATION) at 01:26

## 2024-03-22 RX ADMIN — SODIUM CHLORIDE 8 MG/HR: 900 INJECTION INTRAVENOUS at 05:40

## 2024-03-22 RX ADMIN — NYSTATIN 1 APPLICATION: 100000 POWDER TOPICAL at 01:58

## 2024-03-22 RX ADMIN — CARVEDILOL 6.25 MG: 6.25 TABLET, FILM COATED ORAL at 00:15

## 2024-03-22 RX ADMIN — Medication 400 MG: at 08:13

## 2024-03-22 RX ADMIN — CARVEDILOL 6.25 MG: 6.25 TABLET, FILM COATED ORAL at 08:13

## 2024-03-22 RX ADMIN — BUDESONIDE 0.5 MG: 0.5 INHALANT RESPIRATORY (INHALATION) at 19:57

## 2024-03-22 RX ADMIN — SPIRONOLACTONE 100 MG: 25 TABLET ORAL at 00:15

## 2024-03-22 RX ADMIN — OXYCODONE 10 MG: 5 TABLET ORAL at 23:33

## 2024-03-22 RX ADMIN — CEFTRIAXONE SODIUM 2000 MG: 2 INJECTION, POWDER, FOR SOLUTION INTRAMUSCULAR; INTRAVENOUS at 16:43

## 2024-03-22 RX ADMIN — ROPINIROLE HYDROCHLORIDE 1 MG: 1 TABLET, FILM COATED ORAL at 00:15

## 2024-03-22 RX ADMIN — CETIRIZINE HYDROCHLORIDE 10 MG: 10 TABLET, FILM COATED ORAL at 00:14

## 2024-03-22 RX ADMIN — PROPOFOL 175 MCG/KG/MIN: 10 INJECTION, EMULSION INTRAVENOUS at 13:17

## 2024-03-22 RX ADMIN — RIFAXIMIN 550 MG: 550 TABLET ORAL at 20:14

## 2024-03-22 RX ADMIN — SODIUM CHLORIDE 8 MG/HR: 900 INJECTION INTRAVENOUS at 00:48

## 2024-03-22 RX ADMIN — HYDROMORPHONE HYDROCHLORIDE 0.5 MG: 1 INJECTION, SOLUTION INTRAMUSCULAR; INTRAVENOUS; SUBCUTANEOUS at 08:14

## 2024-03-22 RX ADMIN — NYSTATIN 1 APPLICATION: 100000 POWDER TOPICAL at 08:14

## 2024-03-22 RX ADMIN — BUSPIRONE HYDROCHLORIDE 7.5 MG: 7.5 TABLET ORAL at 00:19

## 2024-03-22 RX ADMIN — SODIUM CHLORIDE 250 MG: 9 INJECTION, SOLUTION INTRAVENOUS at 17:22

## 2024-03-22 RX ADMIN — SODIUM CHLORIDE 8 MG/HR: 900 INJECTION INTRAVENOUS at 10:01

## 2024-03-22 RX ADMIN — SPIRONOLACTONE 100 MG: 25 TABLET ORAL at 08:13

## 2024-03-22 RX ADMIN — NYSTATIN 1 APPLICATION: 100000 POWDER TOPICAL at 20:14

## 2024-03-22 RX ADMIN — FUROSEMIDE 40 MG: 40 TABLET ORAL at 00:14

## 2024-03-22 RX ADMIN — SODIUM CHLORIDE: 9 INJECTION, SOLUTION INTRAVENOUS at 13:13

## 2024-03-22 RX ADMIN — ONDANSETRON 4 MG: 2 INJECTION INTRAMUSCULAR; INTRAVENOUS at 01:58

## 2024-03-22 RX ADMIN — LACTULOSE 68 ML: 20 SOLUTION ORAL at 00:20

## 2024-03-22 RX ADMIN — HYDROMORPHONE HYDROCHLORIDE 0.5 MG: 1 INJECTION, SOLUTION INTRAMUSCULAR; INTRAVENOUS; SUBCUTANEOUS at 04:25

## 2024-03-22 RX ADMIN — HYDROMORPHONE HYDROCHLORIDE 0.5 MG: 1 INJECTION, SOLUTION INTRAMUSCULAR; INTRAVENOUS; SUBCUTANEOUS at 15:05

## 2024-03-22 RX ADMIN — SERTRALINE HYDROCHLORIDE 100 MG: 100 TABLET, FILM COATED ORAL at 20:14

## 2024-03-22 RX ADMIN — SIMETHICONE 80 MG: 80 TABLET, CHEWABLE ORAL at 16:41

## 2024-03-22 RX ADMIN — PROPOFOL 50 MG: 10 INJECTION, EMULSION INTRAVENOUS at 13:15

## 2024-03-22 RX ADMIN — FAMOTIDINE 20 MG: 10 INJECTION INTRAVENOUS at 12:50

## 2024-03-22 NOTE — SIGNIFICANT NOTE
03/22/24 1049   Plan   Plan CM, RN met with patient to introduce self and assess discharge needs.  Patient is a readmission related to previous.  Patient is long term at [facility] for approximately 1 year.  Patient has a bed hold and can return at [facility].

## 2024-03-22 NOTE — ANESTHESIA PREPROCEDURE EVALUATION
Anesthesia Evaluation     Patient summary reviewed and Nursing notes reviewed   history of anesthetic complications:  PONV  NPO Solid Status: > 8 hours  NPO Liquid Status: > 8 hours           Airway   Mallampati: II  TM distance: >3 FB  Neck ROM: full  Large neck circumference and No difficulty expected  Dental    (+) poor dentition        Pulmonary     breath sounds clear to auscultation  (+) asthma,sleep apnea  Cardiovascular - normal exam  Exercise tolerance: poor (<4 METS)    ECG reviewed  Rhythm: regular  Rate: normal    (+) hypertension, CHF , PVD (LLE > RLE), DVT resolved, hyperlipidemia      Neuro/Psych  (+) CVA, psychiatric history Anxiety and Depression  GI/Hepatic/Renal/Endo    (+) obesity, morbid obesity, GERD well controlled, PUD, GI bleeding , hepatitis, liver disease cirrhosis, renal disease-, diabetes mellitus type 2 well controlled    Musculoskeletal     (+) back pain, chronic pain  Abdominal   (+) obese    Abdomen: soft.   Substance History      OB/GYN          Other   arthritis,     ROS/Med Hx Other: Takes percocet for chronic pain     EKG 04/19/23:   HR 91, SR    ECHO 01/20/22:   Saline test results are negative.  ·Estimated left ventricular EF = 59% Left ventricular systolic function is normal.  ·Left ventricular diastolic function was normal.      Labs:   03/22/24 07:55  WBC: 4.72  RBC: 3.06 (L)  Hemoglobin: 7.6 (L)  Hematocrit: 24.9 (L)  Platelets: 79 (L)  RDW: 19.2 (H)  MCV: 81.4  MCH: 24.8 (L)  MCHC: 30.5 (L)  MPV: 11.1  RDW-SD: 57.9 (H)  Neutrophil Rel %: 71.3  Lymphocyte Rel %: 15.5 (L)    Received 1 unit PRBC's on 3/21 , protonix gtt infusing now         Phys Exam Other: Jaundice in color  97% room air  Protonix gtt currently   Receiving 2 unit PRBC currently  Has 2 IV access in left arm  Zofran 4 mg IV given in preop for nausea                Anesthesia Plan    ASA 4 - emergent     general   total IV anesthesia  (Total IV Anesthesia    Patient understands anesthesia not responsible for  dental damage.  )  intravenous induction     Anesthetic plan, risks, benefits, and alternatives have been provided, discussed and informed consent has been obtained with: patient.  Pre-procedure education provided  Plan discussed with CRNA.      CODE STATUS:    Level Of Support Discussed With: Patient  Code Status (Patient has no pulse and is not breathing): CPR (Attempt to Resuscitate)  Medical Interventions (Patient has pulse or is breathing): Full Support

## 2024-03-22 NOTE — PLAN OF CARE
Goal Outcome Evaluation:  Plan of Care Reviewed With: patient        Progress: no change  Outcome Evaluation: er admit. protonix gtt. frequent pain medication. npo- possible procedure today.
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
## 2024-03-22 NOTE — PROGRESS NOTES
[facility]  Inpatient Progress Note    Reason for Follow Up:  anemia    Subjective     Interval History:   Pt presents to endoscopy for further evaluation of anemia.    Current Facility-Administered Medications:     albuterol sulfate HFA (PROVENTIL HFA;VENTOLIN HFA;PROAIR HFA) inhaler 2 puff, 2 puff, Inhalation, Q4H PRN, Brito, Dimpi, DO    budesonide (PULMICORT) nebulizer solution 0.5 mg, 0.5 mg, Nebulization, BID - RT, Brito, Dimpi, DO, 0.5 mg at 03/22/24 0126    busPIRone (BUSPAR) tablet 7.5 mg, 7.5 mg, Oral, TID, Brito, Dimpi, DO, 7.5 mg at 03/22/24 0019    carvedilol (COREG) tablet 6.25 mg, 6.25 mg, Oral, BID With Meals, Brito, Dimpi, DO, 6.25 mg at 03/22/24 0813    cefTRIAXone (ROCEPHIN) 2000 mg/100 mL 0.9% NS IVPB (MBP), 2,000 mg, Intravenous, Q24H, Brito, Dimpi, DO, Stopped at 03/21/24 1658    cetirizine (zyrTEC) tablet 10 mg, 10 mg, Oral, Daily, Brito, Dimpi, DO, 10 mg at 03/22/24 0014    cholecalciferol (VITAMIN D3) tablet 1,000 Units, 1,000 Units, Oral, Daily, Brito, Dimpi, DO    diphenhydrAMINE (BENADRYL) capsule 25 mg, 25 mg, Oral, Q6H PRN, Brito, Dimpi, DO    furosemide (LASIX) tablet 40 mg, 40 mg, Oral, BID, Brito, Dimpi, DO, 40 mg at 03/22/24 0814    HYDROmorphone (DILAUDID) injection 0.5 mg, 0.5 mg, Intravenous, Q2H PRN, Brito, Dimpi, DO, 0.5 mg at 03/22/24 1203    insulin detemir (LEVEMIR) injection 30 Units, 30 Units, Subcutaneous, Q12H, Brito, Dimpi, DO    ipratropium-albuterol (DUO-NEB) nebulizer solution 3 mL, 3 mL, Nebulization, Q4H PRN, Brito, Dimpi, DO    lactulose (CHRONULAC) 10 GM/15ML solution 68 mL, 68 mL, Oral, BID, Brito, Dimpi, DO, 68 mL at 03/22/24 0020    magnesium oxide (MAG-OX) tablet 400 mg, 400 mg, Oral, Daily, Brito, Dimpi, DO, 400 mg at 03/22/24 0813    nystatin (MYCOSTATIN) powder 1 Application, 1 Application, Topical, BID, Dirk [place], PA, 1 Application at 03/22/24 0814    ondansetron (ZOFRAN) injection 4 mg, 4 mg, Intravenous, Q4H PRN, Dirk,  Gloria PA, 4 mg at 03/22/24 0814    oxyCODONE (ROXICODONE) immediate release tablet 10 mg, 10 mg, Oral, Q6H PRN, Brito, Dimpi, DO    [COMPLETED] pantoprazole (PROTONIX) injection 80 mg, 80 mg, Intravenous, Once, 80 mg at 03/21/24 1232 **AND** pantoprazole (PROTONIX) 40 mg in 100mL NS IVPB, 8 mg/hr, Intravenous, Continuous, Tompkins, Neymar, DO, Last Rate: 20 mL/hr at 03/22/24 1001, 8 mg/hr at 03/22/24 1001    riFAXIMin (XIFAXAN) tablet 550 mg, 550 mg, Oral, Q12H, Brito, Dimpi, DO, 550 mg at 03/22/24 0019    rOPINIRole (REQUIP) tablet 1 mg, 1 mg, Oral, Nightly, Brito, Dimpi, DO, 1 mg at 03/22/24 0015    sertraline (ZOLOFT) tablet 100 mg, 100 mg, Oral, Nightly, Brito, Dimpi, DO    simethicone (MYLICON) chewable tablet 80 mg, 80 mg, Oral, TID AC, Brito, Dimpi, DO, 80 mg at 03/22/24 1112    spironolactone (ALDACTONE) tablet 100 mg, 100 mg, Oral, BID, Brito, Dimpi, DO, 100 mg at 03/22/24 0813  Review of Systems:    The following systems were reviewed and negative;  constitution, respiratory, and cardiovascular    Objective     Vital Signs  Temp:  [97 °F (36.1 °C)-98.6 °F (37 °C)] 97 °F (36.1 °C)  Heart Rate:  [71-96] 74  Resp:  [18-20] 18  BP: (118-171)/(50-82) 140/69  Body mass index is 45.72 kg/m².    Intake/Output Summary (Last 24 hours) at 3/22/2024 1245  Last data filed at 3/21/2024 2250  Gross per 24 hour   Intake 535.83 ml   Output --   Net 535.83 ml     No intake/output data recorded.     Physical Exam:   General: awake, alert and in no acute distress   Eyes: eyes move symmetrical in all directions, no scleral icterus   Neck: supple, trachea is midline   Skin: warm and dry, not jaundiced   Cardiovascular: no chest tenderness   Pulm: breathing unlabored   Abdomen: soft, nontender, nondistended   Rectal: deferred   Psychiatric: mental status within normal limits     Results Review:     I reviewed the patient's new clinical results.    Results from last 7 days   Lab Units 03/22/24  0755 03/21/24  1142   WBC 10*3/mm3  4.72 3.92   HEMOGLOBIN g/dL 7.6* 7.3*   HEMATOCRIT % 24.9* 24.2*   PLATELETS 10*3/mm3 79* 82*     Results from last 7 days   Lab Units 03/22/24  0755 03/21/24  1142   SODIUM mmol/L 136 136   POTASSIUM mmol/L 4.3 4.0   CHLORIDE mmol/L 105 103   CO2 mmol/L 22.8 22.5   BUN mg/dL 18 20   CREATININE mg/dL 0.93 0.96   CALCIUM mg/dL 8.1* 8.0*   BILIRUBIN mg/dL  --  0.9   ALK PHOS U/L  --  108   ALT (SGPT) U/L  --  18   AST (SGOT) U/L  --  36   GLUCOSE mg/dL 187* 256*     Results from last 7 days   Lab Units 03/21/24  1142   INR  1.75*     Lab Results   Lab Value Date/Time    LIPASE 35 12/01/2023 1204    LIPASE 42 05/18/2023 1433    LIPASE 63 (H) 03/24/2023 1040    LIPASE 38 02/05/2023 1532    LIPASE 40 12/18/2022 0238    LIPASE 55 11/04/2022 1731    LIPASE 68 (H) 10/31/2022 0924    LIPASE 52 06/27/2022 1449    LIPASE 20 08/03/2021 1206    LIPASE 28 06/01/2021 1622         Assessment & Plan   Assessment:     Anemia    Plan:     - Will proceed with EGD as planned for further evaluation of anemia.   - Benefits vs risks of procedure d/w patient; risks include but are not limited to bleeding, infection, perforation, and risk of sedation.   - Pt understands risks and agrees to proceed.     I discussed the patients findings and my recommendations with patient.         Trena Coombs M.D.  [place] [id number]  [id number]  Office: [phone]

## 2024-03-22 NOTE — ANESTHESIA POSTPROCEDURE EVALUATION
Patient: Nic Nicolas    Procedure Summary       Date: 03/22/24 Room / Location: [facility] ENDOSCOPY 1 / [facility] ENDOSCOPY    Anesthesia Start: 1310 Anesthesia Stop: 1334    Procedure: ESOPHAGOGASTRODUODENOSCOPY WITH APC APPLICATION Diagnosis:       Gastrointestinal hemorrhage associated with peptic ulcer      (Gastrointestinal hemorrhage associated with peptic ulcer [K27.4])    Surgeons: Trena Coombs MD Provider: Stephanie Pedersen CRNA    Anesthesia Type: general ASA Status: 4 - Emergent            Anesthesia Type: general    Vitals  Vitals Value Taken Time   /104 03/22/24 1404   Temp 36.3 °C (97.4 °F) 03/22/24 1355   Pulse 69 03/22/24 1406   Resp 19 03/22/24 1355   SpO2 99 % 03/22/24 1406   Vitals shown include unfiled device data.        Post Anesthesia Care and Evaluation    Post-procedure mental status: acceptable.  Pain management: satisfactory to patient    Airway patency: patent  Anesthetic complications: No anesthetic complications    Cardiovascular status: acceptable  Respiratory status: acceptable    Comments: Per chart review

## 2024-03-23 LAB
ANION GAP SERPL CALCULATED.3IONS-SCNC: 10.5 MMOL/L (ref 5–15)
ANISOCYTOSIS BLD QL: NORMAL
BASOPHILS # BLD AUTO: 0.06 10*3/MM3 (ref 0–0.2)
BASOPHILS NFR BLD AUTO: 1 % (ref 0–1.5)
BH BB BLOOD EXPIRATION DATE: NORMAL
BH BB BLOOD TYPE BARCODE: NORMAL
BH BB DISPENSE STATUS: NORMAL
BH BB PRODUCT CODE: NORMAL
BH BB UNIT NUMBER: NORMAL
BUN SERPL-MCNC: 23 MG/DL (ref 6–20)
BUN/CREAT SERPL: 20 (ref 7–25)
CALCIUM SPEC-SCNC: 8.5 MG/DL (ref 8.6–10.5)
CHLORIDE SERPL-SCNC: 103 MMOL/L (ref 98–107)
CO2 SERPL-SCNC: 22.5 MMOL/L (ref 22–29)
CREAT SERPL-MCNC: 1.15 MG/DL (ref 0.76–1.27)
CROSSMATCH INTERPRETATION: NORMAL
DEPRECATED RDW RBC AUTO: 56.7 FL (ref 37–54)
EGFRCR SERPLBLD CKD-EPI 2021: 74.2 ML/MIN/1.73
ELLIPTOCYTES BLD QL SMEAR: NORMAL
EOSINOPHIL # BLD AUTO: 0.15 10*3/MM3 (ref 0–0.4)
EOSINOPHIL NFR BLD AUTO: 2.5 % (ref 0.3–6.2)
ERYTHROCYTE [DISTWIDTH] IN BLOOD BY AUTOMATED COUNT: 19.1 % (ref 12.3–15.4)
GLUCOSE BLDC GLUCOMTR-MCNC: 141 MG/DL (ref 70–99)
GLUCOSE BLDC GLUCOMTR-MCNC: 192 MG/DL (ref 70–99)
GLUCOSE BLDC GLUCOMTR-MCNC: 194 MG/DL (ref 70–99)
GLUCOSE BLDC GLUCOMTR-MCNC: 218 MG/DL (ref 70–99)
GLUCOSE SERPL-MCNC: 184 MG/DL (ref 65–99)
HCT VFR BLD AUTO: 27.2 % (ref 37.5–51)
HGB BLD-MCNC: 7.9 G/DL (ref 13–17.7)
HYPOCHROMIA BLD QL: NORMAL
IMM GRANULOCYTES # BLD AUTO: 0.02 10*3/MM3 (ref 0–0.05)
IMM GRANULOCYTES NFR BLD AUTO: 0.3 % (ref 0–0.5)
LYMPHOCYTES # BLD AUTO: 0.73 10*3/MM3 (ref 0.7–3.1)
LYMPHOCYTES NFR BLD AUTO: 11.9 % (ref 19.6–45.3)
MCH RBC QN AUTO: 23.9 PG (ref 26.6–33)
MCHC RBC AUTO-ENTMCNC: 29 G/DL (ref 31.5–35.7)
MCV RBC AUTO: 82.4 FL (ref 79–97)
MONOCYTES # BLD AUTO: 0.65 10*3/MM3 (ref 0.1–0.9)
MONOCYTES NFR BLD AUTO: 10.6 % (ref 5–12)
NEUTROPHILS NFR BLD AUTO: 4.51 10*3/MM3 (ref 1.7–7)
NEUTROPHILS NFR BLD AUTO: 73.7 % (ref 42.7–76)
NRBC BLD AUTO-RTO: 0 /100 WBC (ref 0–0.2)
OVALOCYTES BLD QL SMEAR: NORMAL
PLATELET # BLD AUTO: 93 10*3/MM3 (ref 140–450)
PMV BLD AUTO: 11 FL (ref 6–12)
POIKILOCYTOSIS BLD QL SMEAR: NORMAL
POTASSIUM SERPL-SCNC: 3.9 MMOL/L (ref 3.5–5.2)
RBC # BLD AUTO: 3.3 10*6/MM3 (ref 4.14–5.8)
SMALL PLATELETS BLD QL SMEAR: NORMAL
SODIUM SERPL-SCNC: 136 MMOL/L (ref 136–145)
UNIT  ABO: NORMAL
UNIT  RH: NORMAL
WBC MORPH BLD: NORMAL
WBC NRBC COR # BLD AUTO: 6.12 10*3/MM3 (ref 3.4–10.8)

## 2024-03-23 PROCEDURE — 82948 REAGENT STRIP/BLOOD GLUCOSE: CPT

## 2024-03-23 PROCEDURE — 99232 SBSQ HOSP IP/OBS MODERATE 35: CPT | Performed by: HOSPITALIST

## 2024-03-23 PROCEDURE — 25010000002 HYDROMORPHONE 1 MG/ML SOLUTION: Performed by: INTERNAL MEDICINE

## 2024-03-23 PROCEDURE — 25010000002 CEFTRIAXONE PER 250 MG: Performed by: INTERNAL MEDICINE

## 2024-03-23 PROCEDURE — 94799 UNLISTED PULMONARY SVC/PX: CPT

## 2024-03-23 PROCEDURE — 85007 BL SMEAR W/DIFF WBC COUNT: CPT | Performed by: INTERNAL MEDICINE

## 2024-03-23 PROCEDURE — 63710000001 INSULIN DETEMIR PER 5 UNITS: Performed by: INTERNAL MEDICINE

## 2024-03-23 PROCEDURE — 82948 REAGENT STRIP/BLOOD GLUCOSE: CPT | Performed by: INTERNAL MEDICINE

## 2024-03-23 PROCEDURE — 80048 BASIC METABOLIC PNL TOTAL CA: CPT | Performed by: INTERNAL MEDICINE

## 2024-03-23 PROCEDURE — 85025 COMPLETE CBC W/AUTO DIFF WBC: CPT | Performed by: INTERNAL MEDICINE

## 2024-03-23 PROCEDURE — 94664 DEMO&/EVAL PT USE INHALER: CPT

## 2024-03-23 RX ADMIN — SERTRALINE HYDROCHLORIDE 100 MG: 100 TABLET, FILM COATED ORAL at 20:40

## 2024-03-23 RX ADMIN — BUDESONIDE 0.5 MG: 0.5 INHALANT RESPIRATORY (INHALATION) at 07:23

## 2024-03-23 RX ADMIN — PANTOPRAZOLE SODIUM 40 MG: 40 TABLET, DELAYED RELEASE ORAL at 05:35

## 2024-03-23 RX ADMIN — INSULIN DETEMIR 30 UNITS: 100 INJECTION, SOLUTION SUBCUTANEOUS at 21:13

## 2024-03-23 RX ADMIN — LACTULOSE 68 ML: 20 SOLUTION ORAL at 20:43

## 2024-03-23 RX ADMIN — NYSTATIN 1 APPLICATION: 100000 POWDER TOPICAL at 09:10

## 2024-03-23 RX ADMIN — HYDROMORPHONE HYDROCHLORIDE 0.5 MG: 1 INJECTION, SOLUTION INTRAMUSCULAR; INTRAVENOUS; SUBCUTANEOUS at 02:38

## 2024-03-23 RX ADMIN — SIMETHICONE 80 MG: 80 TABLET, CHEWABLE ORAL at 07:05

## 2024-03-23 RX ADMIN — CARVEDILOL 6.25 MG: 6.25 TABLET, FILM COATED ORAL at 17:07

## 2024-03-23 RX ADMIN — Medication 1000 UNITS: at 09:08

## 2024-03-23 RX ADMIN — SPIRONOLACTONE 100 MG: 25 TABLET ORAL at 09:07

## 2024-03-23 RX ADMIN — OXYCODONE 10 MG: 5 TABLET ORAL at 12:37

## 2024-03-23 RX ADMIN — HYDROMORPHONE HYDROCHLORIDE 0.5 MG: 1 INJECTION, SOLUTION INTRAMUSCULAR; INTRAVENOUS; SUBCUTANEOUS at 07:05

## 2024-03-23 RX ADMIN — RIFAXIMIN 550 MG: 550 TABLET ORAL at 09:08

## 2024-03-23 RX ADMIN — CEFTRIAXONE SODIUM 2000 MG: 2 INJECTION, POWDER, FOR SOLUTION INTRAMUSCULAR; INTRAVENOUS at 16:36

## 2024-03-23 RX ADMIN — RIFAXIMIN 550 MG: 550 TABLET ORAL at 20:40

## 2024-03-23 RX ADMIN — NYSTATIN 1 APPLICATION: 100000 POWDER TOPICAL at 20:42

## 2024-03-23 RX ADMIN — FUROSEMIDE 40 MG: 40 TABLET ORAL at 09:07

## 2024-03-23 RX ADMIN — SIMETHICONE 80 MG: 80 TABLET, CHEWABLE ORAL at 12:21

## 2024-03-23 RX ADMIN — FUROSEMIDE 40 MG: 40 TABLET ORAL at 20:40

## 2024-03-23 RX ADMIN — SIMETHICONE 80 MG: 80 TABLET, CHEWABLE ORAL at 17:07

## 2024-03-23 RX ADMIN — HYDROMORPHONE HYDROCHLORIDE 0.5 MG: 1 INJECTION, SOLUTION INTRAMUSCULAR; INTRAVENOUS; SUBCUTANEOUS at 16:22

## 2024-03-23 RX ADMIN — OXYCODONE 10 MG: 5 TABLET ORAL at 22:55

## 2024-03-23 RX ADMIN — BUDESONIDE 0.5 MG: 0.5 INHALANT RESPIRATORY (INHALATION) at 19:39

## 2024-03-23 RX ADMIN — LACTULOSE 68 ML: 20 SOLUTION ORAL at 09:06

## 2024-03-23 RX ADMIN — ROPINIROLE HYDROCHLORIDE 1 MG: 1 TABLET, FILM COATED ORAL at 20:40

## 2024-03-23 RX ADMIN — Medication 400 MG: at 09:08

## 2024-03-23 RX ADMIN — CARVEDILOL 6.25 MG: 6.25 TABLET, FILM COATED ORAL at 09:08

## 2024-03-23 RX ADMIN — OXYCODONE 10 MG: 5 TABLET ORAL at 05:35

## 2024-03-23 RX ADMIN — SPIRONOLACTONE 100 MG: 25 TABLET ORAL at 20:40

## 2024-03-23 RX ADMIN — CETIRIZINE HYDROCHLORIDE 10 MG: 10 TABLET, FILM COATED ORAL at 09:07

## 2024-03-23 RX ADMIN — HYDROMORPHONE HYDROCHLORIDE 0.5 MG: 1 INJECTION, SOLUTION INTRAMUSCULAR; INTRAVENOUS; SUBCUTANEOUS at 18:28

## 2024-03-23 RX ADMIN — INSULIN DETEMIR 30 UNITS: 100 INJECTION, SOLUTION SUBCUTANEOUS at 09:05

## 2024-03-23 RX ADMIN — HYDROMORPHONE HYDROCHLORIDE 0.5 MG: 1 INJECTION, SOLUTION INTRAMUSCULAR; INTRAVENOUS; SUBCUTANEOUS at 10:15

## 2024-03-23 NOTE — DISCHARGE SUMMARY
[facility]         HOSPITALIST  DISCHARGE SUMMARY    Patient Name: Nic Nicolas  : 1965  MRN: [id number]    Date of Admission: 3/21/2024  Date of Discharge:  3/23/2024  Primary Care Physician: Sheldon Carcamo MD    Consults       Date and Time Order Name Status Description    3/21/2024  2:34 PM Hospitalist (on-call MD unless specified)      3/21/2024  2:17 PM Gastroenterology (on-call MD unless specified)              Active and Resolved Hospital Problems:  Active Hospital Problems    Diagnosis POA    **GI bleed [K92.2] Yes    GI bleed [K92.2] Unknown    Gastrointestinal hemorrhage associated with peptic ulcer [K27.4] Unknown      Resolved Hospital Problems   No resolved problems to display.       Hospital Course   Hospital Course:  Patient is a 58-year-old man who presents to the emergency room for worsening anemia noted on labs.  Patient is a resident of [place].     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  Patient has had multiple hospitalizations for GI bleeding in the past.  During that admission he had an EGD which showed severe and he was discharged on Coreg, twice daily PPI, who was given octreotide Depo shot, and iron supplementation.     On arrival to the ED today, patient temperature 98.6, pulse of 85, respiratory rate 20, blood pressure 143/48, and he saturating 99% on room air.     On labs patient sodium is 136, chloride is 103, glucose is 256, calcium 0.0, albumin is 2.9, INR is 1.75, hemoglobin is 7.3.  It was 8.1 on .  His platelets are 82.  Fecal occult blood is positive.     Patient had a paracentesis done on 3/4/2024.  There was 1.2 L of clear elise ascitic fluid removed.     Interval:    EGD shows:  - Normal esophagus. - Portal hypertensive gastropathy. - Gastric antral vascular ectasia with  bleeding. Treated with argon plasma coagulation (APC). - Normal first portion of the duodenum and second portion of the duodenum     Patient tolerated a little dinner.      Discharge patient's hemoglobin was stable.    DISCHARGE Follow Up Recommendations for labs and diagnostics: Follow-up with Dr. Coombs outpatient.      Day of Discharge     Vital Signs:  Temp:  [97.2 °F (36.2 °C)-98.6 °F (37 °C)] 98.1 °F (36.7 °C)  Heart Rate:  [66-86] 83  Resp:  [11-20] 20  BP: (103-145)/(43-71) 114/43  Physical Exam:   Constitutional: Awake, alert, no acute distress              Eyes: Pupils equal, sclerae anicteric, no conjunctival injection              HENT: NCAT, mucous membranes moist              Neck: Supple, no thyromegaly, no lymphadenopathy, trachea midline              Respiratory: Clear to auscultation bilaterally, nonlabored respirations               Cardiovascular: RRR, no murmurs, rubs, or gallops, palpable pedal pulses bilaterally              Gastrointestinal: Positive bowel sounds, soft, nontender, nondistended              Musculoskeletal: No bilateral ankle edema, no clubbing or cyanosis to extremities              Psychiatric: Appropriate affect, cooperative              Neurologic: Oriented x 3, strength symmetric in all extremities, Cranial Nerves grossly intact to confrontation, speech clear              Skin: No rashes          Discharge Details        Discharge Medications        ASK your doctor about these medications        Instructions Start Date   acetaminophen 325 MG chewable tablet  Commonly known as: TYLENOL   650 mg, Oral, Every 6 Hours PRN      albuterol sulfate  (90 Base) MCG/ACT inhaler  Commonly known as: PROVENTIL HFA;VENTOLIN HFA;PROAIR HFA   2 puffs, Inhalation, Every 4 Hours PRN      busPIRone 7.5 MG tablet  Commonly known as: BUSPAR   7.5 mg, Oral, 3 Times Daily      carvedilol 6.25 MG tablet  Commonly known as: COREG   6.25 mg, Oral, 2 Times Daily With Meals, Hold for  systolic BP less than 100 mmHg or heart rate less than 55 bpm.      cetirizine 10 MG tablet  Commonly known as: zyrTEC   10 mg, Oral, Daily      cholecalciferol 25 MCG (1000 UT) tablet   1,000 Units, Oral, Daily      Diclofenac Sodium 1 % gel gel  Commonly known as: VOLTAREN   4 g, Topical, 4 Times Daily      diphenhydrAMINE 25 mg capsule  Commonly known as: BENADRYL   25 mg, Oral, Every 6 Hours PRN      ferrous gluconate 324 MG tablet  Commonly known as: FERGON   324 mg, Oral, Daily With Breakfast      Flovent  MCG/ACT inhaler  Generic drug: fluticasone   1 puff, Inhalation, 2 Times Daily - RT      furosemide 40 MG tablet  Commonly known as: LASIX   TAKE 1 TABLET BY MOUTH 2 (TWO) TIMES A DAY.      HumaLOG KwikPen 100 UNIT/ML solution pen-injector  Generic drug: Insulin Lispro (1 Unit Dial)   15 Units, Subcutaneous, 3 Times Daily Before Meals      ipratropium-albuterol 0.5-2.5 mg/3 ml nebulizer  Commonly known as: DUO-NEB   3 mL, Nebulization, Every 4 Hours PRN      lactulose 10 GM/15ML solution  Commonly known as: CHRONULAC   Take 68 mL by mouth 2 (Two) Times a Day.      Levemir FlexPen 100 UNIT/ML injection  Generic drug: insulin detemir   10 Units, Subcutaneous, Daily      magnesium oxide 400 MG tablet  Commonly known as: MAG-OX   400 mg, Oral, Daily      naloxone 4 MG/0.1ML nasal spray  Commonly known as: NARCAN   CALL 911. Do not prime. Spray into nostril upon signs of opioid overdose. May repeat in 2 to 3 minutes in opposite nostril if no or minimal breathing and responsiveness, then as needed (if doses are available) every 2 to 3 minutes.      nystatin 259446 UNIT/GM powder  Commonly known as: MYCOSTATIN   1 Application, Topical, 2 Times Daily      oxyCODONE 10 MG tablet  Commonly known as: ROXICODONE  Ask about: Which instructions should I use?   10 mg, Oral, Every 6 Hours PRN      pantoprazole 40 MG EC tablet  Commonly known as: Protonix   40 mg, Oral, 2 Times Daily      polyethyl glycol-propyl  glycol 0.4-0.3 % solution ophthalmic solution (artificial tears)  Commonly known as: SYSTANE   2 drops, Both Eyes, Every 1 Hour PRN      riFAXIMin 550 MG tablet  Commonly known as: XIFAXAN   550 mg, Oral, Every 12 Hours Scheduled      rOPINIRole 1 MG tablet  Commonly known as: REQUIP   1 mg, Oral, Nightly, take 1 hour before bedtime      sertraline 100 MG tablet  Commonly known as: ZOLOFT   100 mg, Oral, Nightly      simethicone 80 MG chewable tablet  Commonly known as: MYLICON   80 mg, Oral, 3 Times Daily Before Meals      spironolactone 100 MG tablet  Commonly known as: Aldactone   100 mg, Oral, 2 Times Daily      sucralfate 1 g tablet  Commonly known as: CARAFATE   1 g, Oral, 4 Times Daily               No Known Allergies    Discharge Disposition:      Diet:  Hospital:  Diet Order   Procedures    Diet: Regular/House, Cardiac, Diabetic; Low Sodium (2g); Consistent Carbohydrate; Fluid Consistency: Thin (IDDSI 0)       Discharge Activity:       CODE STATUS:  Code Status and Medical Interventions:   Ordered at: 03/21/24 1606     Level Of Support Discussed With:    Patient     Code Status (Patient has no pulse and is not breathing):    CPR (Attempt to Resuscitate)     Medical Interventions (Patient has pulse or is breathing):    Full Support         Future Appointments   Date Time Provider Department Center   4/30/2024  1:45 PM Karlos Roblero MD [facility] [place] [facility]           Pertinent  and/or Most Recent Results     PROCEDURES:   egd    LAB RESULTS:      Lab 03/23/24  0420 03/22/24  0755 03/21/24  1624 03/21/24  1142   WBC 6.12 4.72  --  3.92   HEMOGLOBIN 7.9* 7.6*  --  7.3*   HEMATOCRIT 27.2* 24.9*  --  24.2*   PLATELETS 93* 79*  --  82*   NEUTROS ABS 4.51 3.36  --  2.73   IMMATURE GRANS (ABS) 0.02 0.02  --  0.01   LYMPHS ABS 0.73 0.73  --  0.66*   MONOS ABS 0.65 0.45  --  0.34   EOS ABS 0.15 0.12  --  0.15   MCV 82.4 81.4  --  80.1   LACTATE  --   --  1.0  --    PROTIME  --   --   --  20.8*   APTT  --   --   --   36.8*         Lab 03/23/24  0420 03/22/24  0755 03/21/24  1142   SODIUM 136 136 136   POTASSIUM 3.9 4.3 4.0   CHLORIDE 103 105 103   CO2 22.5 22.8 22.5   ANION GAP 10.5 8.2 10.5   BUN 23* 18 20   CREATININE 1.15 0.93 0.96   EGFR 74.2 95.8 92.2   GLUCOSE 184* 187* 256*   CALCIUM 8.5* 8.1* 8.0*         Lab 03/21/24  1142   TOTAL PROTEIN 5.8*   ALBUMIN 2.9*   GLOBULIN 2.9   ALT (SGPT) 18   AST (SGOT) 36   BILIRUBIN 0.9   ALK PHOS 108         Lab 03/21/24  1142   PROTIME 20.8*   INR 1.75*             Lab 03/21/24  1142   ABO TYPING A   RH TYPING Positive   ANTIBODY SCREEN Negative         Brief Urine Lab Results  (Last result in the past 365 days)        Color   Clarity   Blood   Leuk Est   Nitrite   Protein   CREAT   Urine HCG        03/21/24 1142 Yellow   Clear   Negative   Negative   Negative   Negative                 Microbiology Results (last 10 days)       Procedure Component Value - Date/Time    Blood Culture - Blood, Arm, Left [[id number]]  (Normal) Collected: 03/21/24 1625    Lab Status: Preliminary result Specimen: Blood from Arm, Left Updated: 03/22/24 1632     Blood Culture No growth at 24 hours    Blood Culture - Blood, Arm, Right [[id number]]  (Normal) Collected: 03/21/24 1624    Lab Status: Preliminary result Specimen: Blood from Arm, Right Updated: 03/22/24 1632     Blood Culture No growth at 24 hours            No radiology results for the last 7 days     Results for orders placed during the hospital encounter of 02/24/22    Duplex Carotid Ultrasound CAR    Interpretation Summary  · No significant stenosis is present in carotid bifurcations bilaterally.  · There are right mid internal carotid artery velocity elevations that would meet criteria for mild to moderate stenosis, however, this appears to be related to tortuosity in this region.  · No significant plaque in the bifurcations bilaterally.  · Vertebral flow is antegrade bilaterally.      Results for orders placed during the hospital encounter of  02/24/22    Duplex Carotid Ultrasound CAR    Interpretation Summary  · No significant stenosis is present in carotid bifurcations bilaterally.  · There are right mid internal carotid artery velocity elevations that would meet criteria for mild to moderate stenosis, however, this appears to be related to tortuosity in this region.  · No significant plaque in the bifurcations bilaterally.  · Vertebral flow is antegrade bilaterally.      Results for orders placed during the hospital encounter of 01/19/22    Adult Transthoracic Echo Complete W/ Cont if Necessary Per Protocol (With Agitated Saline)    Interpretation Summary  · Saline test results are negative.  · Estimated left ventricular EF = 59% Left ventricular systolic function is normal.  · Left ventricular diastolic function was normal.      Labs Pending at Discharge:  Pending Labs       Order Current Status    Blood Culture - Blood, Arm, Left Preliminary result    Blood Culture - Blood, Arm, Right Preliminary result              Time spent on Discharge including face to face service:  greater than 30 minutes    Electronically signed by Willis Brito DO, 03/23/24, 12:17 PM EDT.

## 2024-03-23 NOTE — PROGRESS NOTES
Patient complaining of pain and he does not have a ride until tomorrow. Ok to cancel D/C.    Electronically signed by Willis Brito DO, 03/23/24, 1:49 PM EDT.]

## 2024-03-23 NOTE — PLAN OF CARE
Problem: Adult Inpatient Plan of Care  Goal: Plan of Care Review  Outcome: Ongoing, Progressing     Problem: Adult Inpatient Plan of Care  Goal: Absence of Hospital-Acquired Illness or Injury  Intervention: Identify and Manage Fall Risk  Recent Flowsheet Documentation  Taken 3/23/2024 0830 by Jackie Lyon RN  Safety Promotion/Fall Prevention: safety round/check completed  Intervention: Prevent Skin Injury  Recent Flowsheet Documentation  Taken 3/23/2024 0830 by Jackie Lyon RN  Body Position: position changed independently  Intervention: Prevent and Manage VTE (Venous Thromboembolism) Risk  Recent Flowsheet Documentation  Taken 3/23/2024 0830 by Jackie Lyon RN  Activity Management: ambulated in room  Goal: Optimal Comfort and Wellbeing  Intervention: Monitor Pain and Promote Comfort  Recent Flowsheet Documentation  Taken 3/23/2024 1828 by Jackie Lyon RN  Pain Management Interventions: see MAR  Taken 3/23/2024 1622 by Jackie Lyon RN  Pain Management Interventions: see MAR  Taken 3/23/2024 1237 by Jackie Lyon RN  Pain Management Interventions: see MAR  Taken 3/23/2024 1015 by Jackie Lyon RN  Pain Management Interventions: see MAR  Taken 3/23/2024 0830 by Jackie Lyon RN  Pain Management Interventions: position adjusted  Taken 3/23/2024 0705 by Jackie Lyon RN  Pain Management Interventions: see MAR     Problem: Pain Chronic (Persistent) (Comorbidity Management)  Goal: Acceptable Pain Control and Functional Ability  Intervention: Develop Pain Management Plan  Recent Flowsheet Documentation  Taken 3/23/2024 1828 by Jackie Lyon RN  Pain Management Interventions: see MAR  Taken 3/23/2024 1622 by Jackie Lyon RN  Pain Management Interventions: see MAR  Taken 3/23/2024 1237 by Jackie Lyon RN  Pain Management Interventions: see MAR  Taken 3/23/2024 1015 by Jackie Lyon RN  Pain Management Interventions: see MAR  Taken 3/23/2024 0830 by Jackie Lyon RN  Pain Management  Interventions: position adjusted  Taken 3/23/2024 0705 by Jackie Lyon, RN  Pain Management Interventions: see MAR     Problem: Infection Progression (Sepsis/Septic Shock)  Goal: Absence of Infection Signs and Symptoms  Intervention: Promote Recovery  Recent Flowsheet Documentation  Taken 3/23/2024 0830 by Jackie Lyon, RN  Activity Management: ambulated in room     Problem: Fall Injury Risk  Goal: Absence of Fall and Fall-Related Injury  Intervention: Promote Injury-Free Environment  Recent Flowsheet Documentation  Taken 3/23/2024 0830 by Jackie Lyon, RN  Safety Promotion/Fall Prevention: safety round/check completed   Goal Outcome Evaluation:               Patient likely to discharge tomorrow am. Getting IV and oral pain meds overnight.

## 2024-03-23 NOTE — PLAN OF CARE
Goal Outcome Evaluation:                 VSS, A&O x4, medicated for pain, see MAR. Tolerating diet and medication. Call light in reach, bed in lowest locked position.

## 2024-03-24 LAB
ABO GROUP BLD: NORMAL
ANION GAP SERPL CALCULATED.3IONS-SCNC: 9.4 MMOL/L (ref 5–15)
APTT PPP: 40.2 SECONDS (ref 24.2–34.2)
BASOPHILS # BLD AUTO: 0.03 10*3/MM3 (ref 0–0.2)
BASOPHILS NFR BLD AUTO: 0.8 % (ref 0–1.5)
BLD GP AB SCN SERPL QL: NEGATIVE
BUN SERPL-MCNC: 25 MG/DL (ref 6–20)
BUN/CREAT SERPL: 21.7 (ref 7–25)
CALCIUM SPEC-SCNC: 8.3 MG/DL (ref 8.6–10.5)
CHLORIDE SERPL-SCNC: 102 MMOL/L (ref 98–107)
CO2 SERPL-SCNC: 23.6 MMOL/L (ref 22–29)
CREAT SERPL-MCNC: 1.15 MG/DL (ref 0.76–1.27)
DEPRECATED RDW RBC AUTO: 57.1 FL (ref 37–54)
EGFRCR SERPLBLD CKD-EPI 2021: 74.2 ML/MIN/1.73
EOSINOPHIL # BLD AUTO: 0.1 10*3/MM3 (ref 0–0.4)
EOSINOPHIL NFR BLD AUTO: 2.5 % (ref 0.3–6.2)
ERYTHROCYTE [DISTWIDTH] IN BLOOD BY AUTOMATED COUNT: 19.1 % (ref 12.3–15.4)
GLUCOSE BLDC GLUCOMTR-MCNC: 162 MG/DL (ref 70–99)
GLUCOSE BLDC GLUCOMTR-MCNC: 181 MG/DL (ref 70–99)
GLUCOSE BLDC GLUCOMTR-MCNC: 202 MG/DL (ref 70–99)
GLUCOSE BLDC GLUCOMTR-MCNC: 208 MG/DL (ref 70–99)
GLUCOSE SERPL-MCNC: 244 MG/DL (ref 65–99)
HCT VFR BLD AUTO: 23.5 % (ref 37.5–51)
HGB BLD-MCNC: 7.1 G/DL (ref 13–17.7)
IMM GRANULOCYTES # BLD AUTO: 0.03 10*3/MM3 (ref 0–0.05)
IMM GRANULOCYTES NFR BLD AUTO: 0.8 % (ref 0–0.5)
INR PPP: 1.82 (ref 0.86–1.15)
LDH SERPL-CCNC: 219 U/L (ref 135–225)
LYMPHOCYTES # BLD AUTO: 0.62 10*3/MM3 (ref 0.7–3.1)
LYMPHOCYTES NFR BLD AUTO: 15.8 % (ref 19.6–45.3)
MCH RBC QN AUTO: 24.7 PG (ref 26.6–33)
MCHC RBC AUTO-ENTMCNC: 30.2 G/DL (ref 31.5–35.7)
MCV RBC AUTO: 81.6 FL (ref 79–97)
MONOCYTES # BLD AUTO: 0.48 10*3/MM3 (ref 0.1–0.9)
MONOCYTES NFR BLD AUTO: 12.2 % (ref 5–12)
NEUTROPHILS NFR BLD AUTO: 2.67 10*3/MM3 (ref 1.7–7)
NEUTROPHILS NFR BLD AUTO: 67.9 % (ref 42.7–76)
NRBC BLD AUTO-RTO: 0 /100 WBC (ref 0–0.2)
PLATELET # BLD AUTO: 63 10*3/MM3 (ref 140–450)
PMV BLD AUTO: ABNORMAL FL
POTASSIUM SERPL-SCNC: 4.1 MMOL/L (ref 3.5–5.2)
PROT SERPL-MCNC: 5.9 G/DL (ref 6–8.5)
PROTHROMBIN TIME: 21.4 SECONDS (ref 11.8–14.9)
RBC # BLD AUTO: 2.88 10*6/MM3 (ref 4.14–5.8)
RH BLD: POSITIVE
SODIUM SERPL-SCNC: 135 MMOL/L (ref 136–145)
T&S EXPIRATION DATE: NORMAL
WBC NRBC COR # BLD AUTO: 3.93 10*3/MM3 (ref 3.4–10.8)

## 2024-03-24 PROCEDURE — P9016 RBC LEUKOCYTES REDUCED: HCPCS

## 2024-03-24 PROCEDURE — 25010000002 CEFTRIAXONE PER 250 MG: Performed by: INTERNAL MEDICINE

## 2024-03-24 PROCEDURE — 99239 HOSP IP/OBS DSCHRG MGMT >30: CPT | Performed by: HOSPITALIST

## 2024-03-24 PROCEDURE — 83615 LACTATE (LD) (LDH) ENZYME: CPT | Performed by: HOSPITALIST

## 2024-03-24 PROCEDURE — 82948 REAGENT STRIP/BLOOD GLUCOSE: CPT | Performed by: INTERNAL MEDICINE

## 2024-03-24 PROCEDURE — 85025 COMPLETE CBC W/AUTO DIFF WBC: CPT | Performed by: INTERNAL MEDICINE

## 2024-03-24 PROCEDURE — 80048 BASIC METABOLIC PNL TOTAL CA: CPT | Performed by: INTERNAL MEDICINE

## 2024-03-24 PROCEDURE — 25010000002 HYDROMORPHONE 1 MG/ML SOLUTION: Performed by: INTERNAL MEDICINE

## 2024-03-24 PROCEDURE — 94664 DEMO&/EVAL PT USE INHALER: CPT

## 2024-03-24 PROCEDURE — 63710000001 INSULIN DETEMIR PER 5 UNITS: Performed by: INTERNAL MEDICINE

## 2024-03-24 PROCEDURE — 36430 TRANSFUSION BLD/BLD COMPNT: CPT

## 2024-03-24 PROCEDURE — 86900 BLOOD TYPING SEROLOGIC ABO: CPT | Performed by: HOSPITALIST

## 2024-03-24 PROCEDURE — 86923 COMPATIBILITY TEST ELECTRIC: CPT

## 2024-03-24 PROCEDURE — 25810000003 SODIUM CHLORIDE 0.9 % SOLUTION: Performed by: HOSPITALIST

## 2024-03-24 PROCEDURE — 86901 BLOOD TYPING SEROLOGIC RH(D): CPT | Performed by: HOSPITALIST

## 2024-03-24 PROCEDURE — 82948 REAGENT STRIP/BLOOD GLUCOSE: CPT

## 2024-03-24 PROCEDURE — 85610 PROTHROMBIN TIME: CPT | Performed by: HOSPITALIST

## 2024-03-24 PROCEDURE — 86900 BLOOD TYPING SEROLOGIC ABO: CPT

## 2024-03-24 PROCEDURE — 85730 THROMBOPLASTIN TIME PARTIAL: CPT | Performed by: HOSPITALIST

## 2024-03-24 PROCEDURE — 94799 UNLISTED PULMONARY SVC/PX: CPT

## 2024-03-24 PROCEDURE — 84155 ASSAY OF PROTEIN SERUM: CPT | Performed by: HOSPITALIST

## 2024-03-24 PROCEDURE — 86850 RBC ANTIBODY SCREEN: CPT | Performed by: HOSPITALIST

## 2024-03-24 PROCEDURE — 25010000002 NA FERRIC GLUC CPLX PER 12.5 MG: Performed by: HOSPITALIST

## 2024-03-24 RX ORDER — CYCLOBENZAPRINE HCL 5 MG
5 TABLET ORAL ONCE AS NEEDED
Status: COMPLETED | OUTPATIENT
Start: 2024-03-24 | End: 2024-03-25

## 2024-03-24 RX ORDER — FERROUS GLUCONATE 324(38)MG
324 TABLET ORAL
Qty: 30 TABLET | Refills: 0 | Status: SHIPPED | OUTPATIENT
Start: 2024-03-24 | End: 2024-04-23

## 2024-03-24 RX ORDER — ALBUMIN (HUMAN) 12.5 G/50ML
12.5 SOLUTION INTRAVENOUS DAILY PRN
OUTPATIENT
Start: 2024-03-24

## 2024-03-24 RX ADMIN — SODIUM CHLORIDE 250 MG: 9 INJECTION, SOLUTION INTRAVENOUS at 15:53

## 2024-03-24 RX ADMIN — HYDROMORPHONE HYDROCHLORIDE 0.5 MG: 1 INJECTION, SOLUTION INTRAMUSCULAR; INTRAVENOUS; SUBCUTANEOUS at 00:26

## 2024-03-24 RX ADMIN — CARVEDILOL 6.25 MG: 6.25 TABLET, FILM COATED ORAL at 08:01

## 2024-03-24 RX ADMIN — RIFAXIMIN 550 MG: 550 TABLET ORAL at 15:53

## 2024-03-24 RX ADMIN — SPIRONOLACTONE 100 MG: 25 TABLET ORAL at 20:20

## 2024-03-24 RX ADMIN — INSULIN DETEMIR 30 UNITS: 100 INJECTION, SOLUTION SUBCUTANEOUS at 20:19

## 2024-03-24 RX ADMIN — NYSTATIN 1 APPLICATION: 100000 POWDER TOPICAL at 08:03

## 2024-03-24 RX ADMIN — Medication 1000 UNITS: at 08:02

## 2024-03-24 RX ADMIN — OXYCODONE 10 MG: 5 TABLET ORAL at 14:25

## 2024-03-24 RX ADMIN — RIFAXIMIN 550 MG: 550 TABLET ORAL at 20:25

## 2024-03-24 RX ADMIN — INSULIN DETEMIR 30 UNITS: 100 INJECTION, SOLUTION SUBCUTANEOUS at 08:02

## 2024-03-24 RX ADMIN — HYDROMORPHONE HYDROCHLORIDE 0.5 MG: 1 INJECTION, SOLUTION INTRAMUSCULAR; INTRAVENOUS; SUBCUTANEOUS at 06:39

## 2024-03-24 RX ADMIN — SERTRALINE HYDROCHLORIDE 100 MG: 100 TABLET, FILM COATED ORAL at 20:25

## 2024-03-24 RX ADMIN — OXYCODONE 10 MG: 5 TABLET ORAL at 05:05

## 2024-03-24 RX ADMIN — NYSTATIN 1 APPLICATION: 100000 POWDER TOPICAL at 20:25

## 2024-03-24 RX ADMIN — BUSPIRONE HYDROCHLORIDE 7.5 MG: 7.5 TABLET ORAL at 16:35

## 2024-03-24 RX ADMIN — CEFTRIAXONE SODIUM 2000 MG: 2 INJECTION, POWDER, FOR SOLUTION INTRAMUSCULAR; INTRAVENOUS at 16:25

## 2024-03-24 RX ADMIN — BUDESONIDE 0.5 MG: 0.5 INHALANT RESPIRATORY (INHALATION) at 06:24

## 2024-03-24 RX ADMIN — PANTOPRAZOLE SODIUM 40 MG: 40 TABLET, DELAYED RELEASE ORAL at 05:05

## 2024-03-24 RX ADMIN — SIMETHICONE 80 MG: 80 TABLET, CHEWABLE ORAL at 08:02

## 2024-03-24 RX ADMIN — SIMETHICONE 80 MG: 80 TABLET, CHEWABLE ORAL at 10:54

## 2024-03-24 RX ADMIN — HYDROMORPHONE HYDROCHLORIDE 0.5 MG: 1 INJECTION, SOLUTION INTRAMUSCULAR; INTRAVENOUS; SUBCUTANEOUS at 02:32

## 2024-03-24 RX ADMIN — LACTULOSE 68 ML: 20 SOLUTION ORAL at 08:00

## 2024-03-24 RX ADMIN — SPIRONOLACTONE 100 MG: 25 TABLET ORAL at 08:00

## 2024-03-24 RX ADMIN — BUDESONIDE 0.5 MG: 0.5 INHALANT RESPIRATORY (INHALATION) at 18:32

## 2024-03-24 RX ADMIN — SIMETHICONE 80 MG: 80 TABLET, CHEWABLE ORAL at 17:23

## 2024-03-24 RX ADMIN — HYDROMORPHONE HYDROCHLORIDE 0.5 MG: 1 INJECTION, SOLUTION INTRAMUSCULAR; INTRAVENOUS; SUBCUTANEOUS at 10:54

## 2024-03-24 RX ADMIN — FUROSEMIDE 40 MG: 40 TABLET ORAL at 08:02

## 2024-03-24 RX ADMIN — BUSPIRONE HYDROCHLORIDE 7.5 MG: 7.5 TABLET ORAL at 20:25

## 2024-03-24 RX ADMIN — BUSPIRONE HYDROCHLORIDE 7.5 MG: 7.5 TABLET ORAL at 08:00

## 2024-03-24 RX ADMIN — CETIRIZINE HYDROCHLORIDE 10 MG: 10 TABLET, FILM COATED ORAL at 08:02

## 2024-03-24 RX ADMIN — ROPINIROLE HYDROCHLORIDE 1 MG: 1 TABLET, FILM COATED ORAL at 20:20

## 2024-03-24 RX ADMIN — OXYCODONE 10 MG: 5 TABLET ORAL at 20:20

## 2024-03-24 RX ADMIN — Medication 400 MG: at 08:01

## 2024-03-24 RX ADMIN — CARVEDILOL 6.25 MG: 6.25 TABLET, FILM COATED ORAL at 17:23

## 2024-03-24 RX ADMIN — FUROSEMIDE 40 MG: 40 TABLET ORAL at 20:19

## 2024-03-24 NOTE — DISCHARGE SUMMARY
[facility]         HOSPITALIST  DISCHARGE SUMMARY    Patient Name: Nic Nicolas  : 1965  MRN: [id number]    Date of Admission: 3/21/2024  Date of Discharge:  3/24/2024  Primary Care Physician: Sheldon Carcamo MD    Consults       Date and Time Order Name Status Description    3/21/2024  2:34 PM Hospitalist (on-call MD unless specified)      3/21/2024  2:17 PM Gastroenterology (on-call MD unless specified)              Active and Resolved Hospital Problems:  Active Hospital Problems    Diagnosis POA    **GI bleed [K92.2] Yes    GI bleed [K92.2] Unknown    Gastrointestinal hemorrhage associated with peptic ulcer [K27.4] Unknown      Resolved Hospital Problems   No resolved problems to display.       Hospital Course   Hospital Course:  Patient is a 58-year-old man who presents to the emergency room for worsening anemia noted on labs.  Patient is a resident of [place].     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  Patient has had multiple hospitalizations for GI bleeding in the past.  During that admission he had an EGD which showed severe and he was discharged on Coreg, twice daily PPI, who was given octreotide Depo shot, and iron supplementation.     On arrival to the ED today, patient temperature 98.6, pulse of 85, respiratory rate 20, blood pressure 143/48, and he saturating 99% on room air.     On labs patient sodium is 136, chloride is 103, glucose is 256, calcium 0.0, albumin is 2.9, INR is 1.75, hemoglobin is 7.3.  It was 8.1 on .  His platelets are 82.  Fecal occult blood is positive.     Patient had a paracentesis done on 3/4/2024.  There was 1.2 L of clear elise ascitic fluid removed.     Interval:    EGD shows:  - Normal esophagus. - Portal hypertensive gastropathy. - Gastric antral vascular ectasia with  bleeding. Treated with argon plasma coagulation (APC). - Normal first portion of the duodenum and second portion of the duodenum     Patient tolerated a little dinner.      Discharge patient's hemoglobin was stable.    DISCHARGE Follow Up Recommendations for labs and diagnostics: Follow-up with Dr. Coombs outpatient.      Day of Discharge     Vital Signs:  Temp:  [97.3 °F (36.3 °C)-98.8 °F (37.1 °C)] 98.1 °F (36.7 °C)  Heart Rate:  [70-81] 70  Resp:  [18-20] 18  BP: (113-142)/(35-84) 124/61  Physical Exam:   Constitutional: Awake, alert, no acute distress              Eyes: Pupils equal, sclerae anicteric, no conjunctival injection              HENT: NCAT, mucous membranes moist              Neck: Supple, no thyromegaly, no lymphadenopathy, trachea midline              Respiratory: Clear to auscultation bilaterally, nonlabored respirations               Cardiovascular: RRR, no murmurs, rubs, or gallops, palpable pedal pulses bilaterally              Gastrointestinal: Positive bowel sounds, soft, nontender, nondistended              Musculoskeletal: No bilateral ankle edema, no clubbing or cyanosis to extremities              Psychiatric: Appropriate affect, cooperative              Neurologic: Oriented x 3, strength symmetric in all extremities, Cranial Nerves grossly intact to confrontation, speech clear              Skin: No rashes          Discharge Details        Discharge Medications        Changes to Medications        Instructions Start Date   riFAXIMin 550 MG tablet  Commonly known as: XIFAXAN  What changed: when to take this   550 mg, Oral, Every 12 Hours Scheduled             Continue These Medications        Instructions Start Date   acetaminophen 325 MG chewable tablet  Commonly known as: TYLENOL   650 mg, Oral, Every 6 Hours PRN      albuterol sulfate  (90 Base) MCG/ACT inhaler  Commonly known as: PROVENTIL HFA;VENTOLIN HFA;PROAIR HFA   2 puffs, Inhalation, Every 4 Hours PRN      busPIRone 7.5  MG tablet  Commonly known as: BUSPAR   7.5 mg, Oral, 3 Times Daily      carvedilol 6.25 MG tablet  Commonly known as: COREG   6.25 mg, Oral, 2 Times Daily With Meals, Hold for systolic BP less than 100 mmHg or heart rate less than 55 bpm.      cetirizine 10 MG tablet  Commonly known as: zyrTEC   10 mg, Oral, Daily      cholecalciferol 25 MCG (1000 UT) tablet   1,000 Units, Oral, Daily      Diclofenac Sodium 1 % gel gel  Commonly known as: VOLTAREN   4 g, Topical, 4 Times Daily      diphenhydrAMINE 25 mg capsule  Commonly known as: BENADRYL   25 mg, Oral, Every 6 Hours PRN      ferrous gluconate 324 MG tablet  Commonly known as: FERGON   324 mg, Oral, Daily With Breakfast      Flovent  MCG/ACT inhaler  Generic drug: fluticasone   1 puff, Inhalation, 2 Times Daily - RT      furosemide 40 MG tablet  Commonly known as: LASIX   TAKE 1 TABLET BY MOUTH 2 (TWO) TIMES A DAY.      HumaLOG KwikPen 100 UNIT/ML solution pen-injector  Generic drug: Insulin Lispro (1 Unit Dial)   15 Units, Subcutaneous, 3 Times Daily Before Meals      ipratropium-albuterol 0.5-2.5 mg/3 ml nebulizer  Commonly known as: DUO-NEB   3 mL, Nebulization, Every 4 Hours PRN      lactulose 10 GM/15ML solution  Commonly known as: CHRONULAC   Take 68 mL by mouth 2 (Two) Times a Day.      Levemir FlexPen 100 UNIT/ML injection  Generic drug: insulin detemir   10 Units, Subcutaneous, Daily      magnesium oxide 400 MG tablet  Commonly known as: MAG-OX   400 mg, Oral, Daily      naloxone 4 MG/0.1ML nasal spray  Commonly known as: NARCAN   CALL 911. Do not prime. Spray into nostril upon signs of opioid overdose. May repeat in 2 to 3 minutes in opposite nostril if no or minimal breathing and responsiveness, then as needed (if doses are available) every 2 to 3 minutes.      nystatin 063516 UNIT/GM powder  Commonly known as: MYCOSTATIN   1 Application, Topical, 2 Times Daily      oxyCODONE 10 MG tablet  Commonly known as: ROXICODONE   10 mg, Oral, Every 6 Hours  PRN      polyethyl glycol-propyl glycol 0.4-0.3 % solution ophthalmic solution (artificial tears)  Commonly known as: SYSTANE   2 drops, Both Eyes, Every 1 Hour PRN      rOPINIRole 1 MG tablet  Commonly known as: REQUIP   1 mg, Oral, Nightly, take 1 hour before bedtime      sertraline 100 MG tablet  Commonly known as: ZOLOFT   100 mg, Oral, Nightly      simethicone 80 MG chewable tablet  Commonly known as: MYLICON   80 mg, Oral, 3 Times Daily Before Meals      spironolactone 100 MG tablet  Commonly known as: Aldactone   100 mg, Oral, 2 Times Daily      sucralfate 1 g tablet  Commonly known as: CARAFATE   1 g, Oral, 4 Times Daily             ASK your doctor about these medications        Instructions Start Date   pantoprazole 40 MG EC tablet  Commonly known as: Protonix  Ask about: Should I take this medication?   40 mg, Oral, 2 Times Daily               No Known Allergies    Discharge Disposition:  Home or Self Care    Diet:  Hospital:  Diet Order   Procedures    Diet: Regular/House, Cardiac, Diabetic; Low Sodium (2g); Consistent Carbohydrate; Fluid Consistency: Thin (IDDSI 0)       Discharge Activity:       CODE STATUS:  Code Status and Medical Interventions:   Ordered at: 03/21/24 1606     Level Of Support Discussed With:    Patient     Code Status (Patient has no pulse and is not breathing):    CPR (Attempt to Resuscitate)     Medical Interventions (Patient has pulse or is breathing):    Full Support         Future Appointments   Date Time Provider Department Center   4/30/2024  1:45 PM Karlos Roblero MD [facility]W [facility]           Pertinent  and/or Most Recent Results     PROCEDURES:   egd    LAB RESULTS:      Lab 03/24/24  0437 03/23/24  0420 03/22/24  0755 03/21/24  1624 03/21/24  1142   WBC 3.93 6.12 4.72  --  3.92   HEMOGLOBIN 7.1* 7.9* 7.6*  --  7.3*   HEMATOCRIT 23.5* 27.2* 24.9*  --  24.2*   PLATELETS 63* 93* 79*  --  82*   NEUTROS ABS 2.67 4.51 3.36  --  2.73   IMMATURE GRANS (ABS) 0.03 0.02 0.02   --  0.01   LYMPHS ABS 0.62* 0.73 0.73  --  0.66*   MONOS ABS 0.48 0.65 0.45  --  0.34   EOS ABS 0.10 0.15 0.12  --  0.15   MCV 81.6 82.4 81.4  --  80.1   LACTATE  --   --   --  1.0  --    PROTIME  --   --   --   --  20.8*   APTT  --   --   --   --  36.8*         Lab 03/24/24  0437 03/23/24  0420 03/22/24  0755 03/21/24  1142   SODIUM 135* 136 136 136   POTASSIUM 4.1 3.9 4.3 4.0   CHLORIDE 102 103 105 103   CO2 23.6 22.5 22.8 22.5   ANION GAP 9.4 10.5 8.2 10.5   BUN 25* 23* 18 20   CREATININE 1.15 1.15 0.93 0.96   EGFR 74.2 74.2 95.8 92.2   GLUCOSE 244* 184* 187* 256*   CALCIUM 8.3* 8.5* 8.1* 8.0*         Lab 03/21/24  1142   TOTAL PROTEIN 5.8*   ALBUMIN 2.9*   GLOBULIN 2.9   ALT (SGPT) 18   AST (SGOT) 36   BILIRUBIN 0.9   ALK PHOS 108         Lab 03/21/24  1142   PROTIME 20.8*   INR 1.75*             Lab 03/21/24  1142   ABO TYPING A   RH TYPING Positive   ANTIBODY SCREEN Negative         Brief Urine Lab Results  (Last result in the past 365 days)        Color   Clarity   Blood   Leuk Est   Nitrite   Protein   CREAT   Urine HCG        03/21/24 1142 Yellow   Clear   Negative   Negative   Negative   Negative                 Microbiology Results (last 10 days)       Procedure Component Value - Date/Time    Blood Culture - Blood, Arm, Left [[id number]]  (Normal) Collected: 03/21/24 1625    Lab Status: Preliminary result Specimen: Blood from Arm, Left Updated: 03/23/24 1631     Blood Culture No growth at 2 days    Blood Culture - Blood, Arm, Right [[id number]]  (Normal) Collected: 03/21/24 1624    Lab Status: Preliminary result Specimen: Blood from Arm, Right Updated: 03/23/24 1631     Blood Culture No growth at 2 days            No radiology results for the last 7 days     Results for orders placed during the hospital encounter of 02/24/22    Duplex Carotid Ultrasound CAR    Interpretation Summary  · No significant stenosis is present in carotid bifurcations bilaterally.  · There are right mid internal carotid artery  velocity elevations that would meet criteria for mild to moderate stenosis, however, this appears to be related to tortuosity in this region.  · No significant plaque in the bifurcations bilaterally.  · Vertebral flow is antegrade bilaterally.      Results for orders placed during the hospital encounter of 02/24/22    Duplex Carotid Ultrasound CAR    Interpretation Summary  · No significant stenosis is present in carotid bifurcations bilaterally.  · There are right mid internal carotid artery velocity elevations that would meet criteria for mild to moderate stenosis, however, this appears to be related to tortuosity in this region.  · No significant plaque in the bifurcations bilaterally.  · Vertebral flow is antegrade bilaterally.      Results for orders placed during the hospital encounter of 01/19/22    Adult Transthoracic Echo Complete W/ Cont if Necessary Per Protocol (With Agitated Saline)    Interpretation Summary  · Saline test results are negative.  · Estimated left ventricular EF = 59% Left ventricular systolic function is normal.  · Left ventricular diastolic function was normal.      Labs Pending at Discharge:  Pending Labs       Order Current Status    Blood Culture - Blood, Arm, Left Preliminary result    Blood Culture - Blood, Arm, Right Preliminary result              Time spent on Discharge including face to face service:  greater than 30 minutes    Electronically signed by Willis Brito DO, 03/24/24, 2:48 PM EDT.
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
## 2024-03-24 NOTE — PLAN OF CARE
Goal Outcome Evaluation:         Pt A&O. Medicated for pain per MAR. Pt. Is to receive one unit of blood before discharge this evening per MD. Pt ambulated in halls. Diet well tolerated. Call light in reach.

## 2024-03-25 ENCOUNTER — READMISSION MANAGEMENT (OUTPATIENT)
Dept: CALL CENTER | Facility: HOSPITAL | Age: 59
End: 2024-03-25
Payer: MEDICAID

## 2024-03-25 VITALS
RESPIRATION RATE: 18 BRPM | SYSTOLIC BLOOD PRESSURE: 144 MMHG | HEART RATE: 76 BPM | DIASTOLIC BLOOD PRESSURE: 65 MMHG | HEIGHT: 68 IN | TEMPERATURE: 98.1 F | OXYGEN SATURATION: 95 % | BODY MASS INDEX: 45.57 KG/M2 | WEIGHT: 300.71 LBS

## 2024-03-25 LAB
ANION GAP SERPL CALCULATED.3IONS-SCNC: 9.7 MMOL/L (ref 5–15)
BASOPHILS # BLD AUTO: 0.05 10*3/MM3 (ref 0–0.2)
BASOPHILS NFR BLD AUTO: 1.2 % (ref 0–1.5)
BH BB BLOOD EXPIRATION DATE: NORMAL
BH BB BLOOD TYPE BARCODE: 6200
BH BB DISPENSE STATUS: NORMAL
BH BB PRODUCT CODE: NORMAL
BH BB UNIT NUMBER: NORMAL
BUN SERPL-MCNC: 26 MG/DL (ref 6–20)
BUN/CREAT SERPL: 25.2 (ref 7–25)
CALCIUM SPEC-SCNC: 8.3 MG/DL (ref 8.6–10.5)
CHLORIDE SERPL-SCNC: 103 MMOL/L (ref 98–107)
CO2 SERPL-SCNC: 22.3 MMOL/L (ref 22–29)
CREAT SERPL-MCNC: 1.03 MG/DL (ref 0.76–1.27)
CROSSMATCH INTERPRETATION: NORMAL
DEPRECATED RDW RBC AUTO: 55.5 FL (ref 37–54)
EGFRCR SERPLBLD CKD-EPI 2021: 84.7 ML/MIN/1.73
EOSINOPHIL # BLD AUTO: 0.11 10*3/MM3 (ref 0–0.4)
EOSINOPHIL NFR BLD AUTO: 2.6 % (ref 0.3–6.2)
ERYTHROCYTE [DISTWIDTH] IN BLOOD BY AUTOMATED COUNT: 19.2 % (ref 12.3–15.4)
GLUCOSE BLDC GLUCOMTR-MCNC: 183 MG/DL (ref 70–99)
GLUCOSE SERPL-MCNC: 204 MG/DL (ref 65–99)
HCT VFR BLD AUTO: 26.3 % (ref 37.5–51)
HGB BLD-MCNC: 8 G/DL (ref 13–17.7)
IMM GRANULOCYTES # BLD AUTO: 0.03 10*3/MM3 (ref 0–0.05)
IMM GRANULOCYTES NFR BLD AUTO: 0.7 % (ref 0–0.5)
LYMPHOCYTES # BLD AUTO: 0.72 10*3/MM3 (ref 0.7–3.1)
LYMPHOCYTES NFR BLD AUTO: 17.2 % (ref 19.6–45.3)
MCH RBC QN AUTO: 24.8 PG (ref 26.6–33)
MCHC RBC AUTO-ENTMCNC: 30.4 G/DL (ref 31.5–35.7)
MCV RBC AUTO: 81.7 FL (ref 79–97)
MONOCYTES # BLD AUTO: 0.51 10*3/MM3 (ref 0.1–0.9)
MONOCYTES NFR BLD AUTO: 12.2 % (ref 5–12)
NEUTROPHILS NFR BLD AUTO: 2.76 10*3/MM3 (ref 1.7–7)
NEUTROPHILS NFR BLD AUTO: 66.1 % (ref 42.7–76)
NRBC BLD AUTO-RTO: 0 /100 WBC (ref 0–0.2)
PLATELET # BLD AUTO: 73 10*3/MM3 (ref 140–450)
PMV BLD AUTO: ABNORMAL FL
POTASSIUM SERPL-SCNC: 4.1 MMOL/L (ref 3.5–5.2)
RBC # BLD AUTO: 3.22 10*6/MM3 (ref 4.14–5.8)
SODIUM SERPL-SCNC: 135 MMOL/L (ref 136–145)
UNIT  ABO: NORMAL
UNIT  RH: NORMAL
WBC NRBC COR # BLD AUTO: 4.18 10*3/MM3 (ref 3.4–10.8)

## 2024-03-25 PROCEDURE — 94799 UNLISTED PULMONARY SVC/PX: CPT

## 2024-03-25 PROCEDURE — 99231 SBSQ HOSP IP/OBS SF/LOW 25: CPT | Performed by: INTERNAL MEDICINE

## 2024-03-25 PROCEDURE — 94664 DEMO&/EVAL PT USE INHALER: CPT

## 2024-03-25 PROCEDURE — 82948 REAGENT STRIP/BLOOD GLUCOSE: CPT | Performed by: INTERNAL MEDICINE

## 2024-03-25 PROCEDURE — 63710000001 INSULIN DETEMIR PER 5 UNITS: Performed by: INTERNAL MEDICINE

## 2024-03-25 PROCEDURE — 80048 BASIC METABOLIC PNL TOTAL CA: CPT | Performed by: INTERNAL MEDICINE

## 2024-03-25 PROCEDURE — 85025 COMPLETE CBC W/AUTO DIFF WBC: CPT | Performed by: INTERNAL MEDICINE

## 2024-03-25 RX ADMIN — NYSTATIN 1 APPLICATION: 100000 POWDER TOPICAL at 08:27

## 2024-03-25 RX ADMIN — PANTOPRAZOLE SODIUM 40 MG: 40 TABLET, DELAYED RELEASE ORAL at 05:15

## 2024-03-25 RX ADMIN — CARVEDILOL 6.25 MG: 6.25 TABLET, FILM COATED ORAL at 08:26

## 2024-03-25 RX ADMIN — Medication 1000 UNITS: at 09:33

## 2024-03-25 RX ADMIN — CETIRIZINE HYDROCHLORIDE 10 MG: 10 TABLET, FILM COATED ORAL at 08:26

## 2024-03-25 RX ADMIN — BUDESONIDE 0.5 MG: 0.5 INHALANT RESPIRATORY (INHALATION) at 07:36

## 2024-03-25 RX ADMIN — OXYCODONE 10 MG: 5 TABLET ORAL at 08:26

## 2024-03-25 RX ADMIN — INSULIN DETEMIR 30 UNITS: 100 INJECTION, SOLUTION SUBCUTANEOUS at 08:27

## 2024-03-25 RX ADMIN — OXYCODONE 10 MG: 5 TABLET ORAL at 02:27

## 2024-03-25 RX ADMIN — SPIRONOLACTONE 100 MG: 25 TABLET ORAL at 08:26

## 2024-03-25 RX ADMIN — CYCLOBENZAPRINE HYDROCHLORIDE 5 MG: 5 TABLET, FILM COATED ORAL at 00:26

## 2024-03-25 RX ADMIN — SIMETHICONE 80 MG: 80 TABLET, CHEWABLE ORAL at 08:26

## 2024-03-25 RX ADMIN — FUROSEMIDE 40 MG: 40 TABLET ORAL at 08:26

## 2024-03-25 RX ADMIN — RIFAXIMIN 550 MG: 550 TABLET ORAL at 09:33

## 2024-03-25 RX ADMIN — Medication 400 MG: at 08:26

## 2024-03-25 RX ADMIN — BUSPIRONE HYDROCHLORIDE 7.5 MG: 7.5 TABLET ORAL at 08:27

## 2024-03-25 NOTE — PLAN OF CARE
Goal Outcome Evaluation:      Dc to [facility] with sister                                        Problem: Adult Inpatient Plan of Care  Goal: Plan of Care Review  3/25/2024 0953 by Fatuma Zuniga RN  Outcome: Unable to Meet, Plan Revised  3/25/2024 0953 by Fatuma Zuniga RN  Outcome: Unable to Meet, Plan Revised  Goal: Patient-Specific Goal (Individualized)  3/25/2024 0953 by Fatuma Zuniga RN  Outcome: Unable to Meet, Plan Revised  3/25/2024 0953 by Fatuma Zuniga RN  Outcome: Unable to Meet, Plan Revised  Goal: Absence of Hospital-Acquired Illness or Injury  3/25/2024 0953 by Fatuma Zuniga RN  Outcome: Unable to Meet, Plan Revised  3/25/2024 0953 by Fatuma Zuniga RN  Outcome: Unable to Meet, Plan Revised  Goal: Optimal Comfort and Wellbeing  3/25/2024 0953 by Fatuma Zuniga RN  Outcome: Unable to Meet, Plan Revised  3/25/2024 0953 by Fatuma Zuniga RN  Outcome: Unable to Meet, Plan Revised  Goal: Readiness for Transition of Care  3/25/2024 0953 by Fatuma Zuniga RN  Outcome: Unable to Meet, Plan Revised  3/25/2024 0953 by Fatuma Zuniga RN  Outcome: Unable to Meet, Plan Revised     Problem: Diabetes Comorbidity  Goal: Blood Glucose Level Within Targeted Range  3/25/2024 0953 by Fatuma Zuniga RN  Outcome: Unable to Meet, Plan Revised  3/25/2024 0953 by Fatuma Zuniga RN  Outcome: Unable to Meet, Plan Revised     Problem: Hypertension Comorbidity  Goal: Blood Pressure in Desired Range  3/25/2024 0953 by Fatuma Zuniga RN  Outcome: Unable to Meet, Plan Revised  3/25/2024 0953 by Fatuma Zuniga RN  Outcome: Unable to Meet, Plan Revised     Problem: Pain Chronic (Persistent) (Comorbidity Management)  Goal: Acceptable Pain Control and Functional Ability  Outcome: Unable to Meet, Plan Revised     Problem: Adjustment to Illness (Sepsis/Septic Shock)  Goal: Optimal Coping  Outcome: Unable to Meet, Plan Revised     Problem: Bleeding (Sepsis/Septic Shock)  Goal: Absence of  Bleeding  Outcome: Unable to Meet, Plan Revised     Problem: Glycemic Control Impaired (Sepsis/Septic Shock)  Goal: Blood Glucose Level Within Desired Range  Outcome: Unable to Meet, Plan Revised     Problem: Infection Progression (Sepsis/Septic Shock)  Goal: Absence of Infection Signs and Symptoms  Outcome: Unable to Meet, Plan Revised     Problem: Nutrition Impaired (Sepsis/Septic Shock)  Goal: Optimal Nutrition Intake  Outcome: Unable to Meet, Plan Revised     Problem: Fall Injury Risk  Goal: Absence of Fall and Fall-Related Injury  Outcome: Unable to Meet, Plan Revised

## 2024-03-25 NOTE — OUTREACH NOTE
Prep Survey      Flowsheet Row Responses   [facility] facility patient discharged from? [facility]   Is LACE score < 7 ? No   Eligibility Not Eligible   What are the reasons patient is not eligible? Other   Does the patient have one of the following disease processes/diagnoses(primary or secondary)? Other   Prep survey completed? Yes            TERESA NEAL - Registered Nurse

## 2024-03-25 NOTE — PLAN OF CARE
Goal Outcome Evaluation:      Patient received 1u PRBCs this shift, VSS, medicated for pain per MAR. [facility]. Adriane Smith RN

## 2024-03-25 NOTE — PROGRESS NOTES
[facility]  Inpatient Progress Note    Reason for Follow Up: Anemia    Subjective     Interval History:   No events overnight.  Patient says still having slight abdominal discomfort in left lower quadrant.  Patient is being discharged shortly to [facility]    Patient had EGD performed on 3/22/2024  Findings included-normal esophagus, portal hypertensive gastropathy, gastric antral vascular ectasia with bleeding-treated with APC, normal duodenum    03/25/2024  Hgb-8.0  HCT-26.3  Platelets-73    No current facility-administered medications for this encounter.    Current Outpatient Medications:     acetaminophen (TYLENOL) 325 MG chewable tablet, Chew 2 tablets Every 6 (Six) Hours As Needed., Disp: , Rfl:     albuterol sulfate  (90 Base) MCG/ACT inhaler, Inhale 2 puffs Every 4 (Four) Hours As Needed for Wheezing., Disp: 18 g, Rfl: 11    busPIRone (BUSPAR) 7.5 MG tablet, Take 1 tablet by mouth 3 (Three) Times a Day., Disp: 90 tablet, Rfl: 5    carvedilol (COREG) 6.25 MG tablet, Take 1 tablet by mouth 2 (Two) Times a Day With Meals. Hold for systolic BP less than 100 mmHg or heart rate less than 55 bpm., Disp: , Rfl:     cetirizine (zyrTEC) 10 MG tablet, Take 1 tablet by mouth Daily., Disp: 90 tablet, Rfl: 3    Cholecalciferol 25 MCG (1000 UT) tablet, Take 1 tablet by mouth Daily., Disp: , Rfl:     Diclofenac Sodium (VOLTAREN) 1 % gel gel, Apply 4 g topically to the appropriate area as directed 4 (Four) Times a Day., Disp: , Rfl:     diphenhydrAMINE (BENADRYL) 25 mg capsule, Take 1 capsule by mouth Every 6 (Six) Hours As Needed for Itching., Disp: , Rfl:     ferrous gluconate (FERGON) 324 MG tablet, Take 1 tablet by mouth Daily With Breakfast for 30 days., Disp: 30 tablet, Rfl: 0    fluticasone (FLOVENT HFA) 110 MCG/ACT inhaler, Inhale 1 puff 2 (Two) Times a Day., Disp: 12 g, Rfl: 12    furosemide (LASIX) 40 MG tablet, TAKE 1 TABLET BY MOUTH 2 (TWO) TIMES A DAY. (Patient taking  differently: Take 1 tablet by mouth 2 (Two) Times a Day.), Disp: 60 tablet, Rfl: 3    insulin detemir (Levemir FlexPen) 100 UNIT/ML injection, Inject 10 Units under the skin into the appropriate area as directed Daily. (Patient taking differently: Inject 30 Units under the skin into the appropriate area as directed 2 (Two) Times a Day.), Disp: 105 mL, Rfl: 1    Insulin Lispro, 1 Unit Dial, (HumaLOG KwikPen) 100 UNIT/ML solution pen-injector, Inject 15 Units under the skin into the appropriate area as directed 3 (Three) Times a Day Before Meals., Disp: , Rfl:     ipratropium-albuterol (DUO-NEB) 0.5-2.5 mg/3 ml nebulizer, Take 3 mL by nebulization Every 4 (Four) Hours As Needed for Wheezing., Disp: , Rfl:     lactulose (CHRONULAC) 10 GM/15ML solution, Take 68 mL by mouth 2 (Two) Times a Day., Disp: , Rfl:     magnesium oxide (MAG-OX) 400 MG tablet, Take 1 tablet by mouth Daily., Disp: 90 tablet, Rfl: 1    naloxone (NARCAN) 4 MG/0.1ML nasal spray, CALL 911. Do not prime. Spray into nostril upon signs of opioid overdose. May repeat in 2 to 3 minutes in opposite nostril if no or minimal breathing and responsiveness, then as needed (if doses are available) every 2 to 3 minutes., Disp: 2 each, Rfl: 0    nystatin (MYCOSTATIN) 263277 UNIT/GM powder, Apply 1 Application topically to the appropriate area as directed 2 (Two) Times a Day., Disp: , Rfl:     oxyCODONE (ROXICODONE) 10 MG tablet, Take 1 tablet by mouth Every 6 (Six) Hours As Needed for Moderate Pain., Disp: , Rfl:     polyethyl glycol-propyl glycol (SYSTANE) 0.4-0.3 % solution ophthalmic solution (artificial tears), Administer 2 drops to both eyes Every 1 (One) Hour As Needed (prn in both eyes)., Disp: 30 mL, Rfl: 2    riFAXIMin (XIFAXAN) 550 MG tablet, Take 1 tablet by mouth Every 12 (Twelve) Hours. Indications: Impaired Brain Function due to Liver Disease (Patient taking differently: Take 1 tablet by mouth 2 (Two) Times a Day. Indications: Impaired Brain Function  due to Liver Disease), Disp: 180 tablet, Rfl: 3    rOPINIRole (REQUIP) 1 MG tablet, Take 1 tablet by mouth Every Night. take 1 hour before bedtime, Disp: 90 tablet, Rfl: 1    sertraline (ZOLOFT) 100 MG tablet, Take 1 tablet by mouth Every Night., Disp: 30 tablet, Rfl: 5    simethicone (MYLICON) 80 MG chewable tablet, Chew 1 tablet 3 (Three) Times a Day Before Meals., Disp: , Rfl:     spironolactone (Aldactone) 100 MG tablet, Take 1 tablet by mouth 2 (Two) Times a Day., Disp: 60 tablet, Rfl: 3    sucralfate (CARAFATE) 1 g tablet, Take 1 tablet by mouth 4 (Four) Times a Day., Disp: , Rfl:   Review of Systems:    All systems were reviewed and negative except for:  Gastrointestinal: positive for  See HPI    Objective     Vital Signs  Temp:  [97.7 °F (36.5 °C)-98.4 °F (36.9 °C)] 98.1 °F (36.7 °C)  Heart Rate:  [62-80] 76  Resp:  [16-18] 18  BP: (101-158)/(48-83) 144/65  Body mass index is 45.72 kg/m².    Intake/Output Summary (Last 24 hours) at 3/25/2024 1253  Last data filed at 3/24/2024 2015  Gross per 24 hour   Intake 735 ml   Output --   Net 735 ml     No intake/output data recorded.     Physical Exam:   General: awake, alert and in no acute distress   Eyes: eyes move symmetrical in all directions, no scleral icterus   Neck: supple, trachea is midline   Skin: warm and dry, not jaundiced   Cardiovascular: regular rhythm and rate   Pulm: Unlabored breathing   Abdomen: soft, tender to palpate in left lower quadrant, non distended   Rectal: deferred   Extremities: no rash or edema   Psychiatric: mental status within normal limits, alert and oriented      Results Review:     I reviewed the patient's new clinical results.    Results from last 7 days   Lab Units 03/25/24  0533 03/24/24  0437 03/23/24  0420   WBC 10*3/mm3 4.18 3.93 6.12   HEMOGLOBIN g/dL 8.0* 7.1* 7.9*   HEMATOCRIT % 26.3* 23.5* 27.2*   PLATELETS 10*3/mm3 73* 63* 93*     Results from last 7 days   Lab Units 03/25/24  0533 03/24/24  0437 03/23/24  0420  03/22/24  0755 03/21/24  1142   SODIUM mmol/L 135* 135* 136   < > 136   POTASSIUM mmol/L 4.1 4.1 3.9   < > 4.0   CHLORIDE mmol/L 103 102 103   < > 103   CO2 mmol/L 22.3 23.6 22.5   < > 22.5   BUN mg/dL 26* 25* 23*   < > 20   CREATININE mg/dL 1.03 1.15 1.15   < > 0.96   CALCIUM mg/dL 8.3* 8.3* 8.5*   < > 8.0*   BILIRUBIN mg/dL  --   --   --   --  0.9   ALK PHOS U/L  --   --   --   --  108   ALT (SGPT) U/L  --   --   --   --  18   AST (SGOT) U/L  --   --   --   --  36   GLUCOSE mg/dL 204* 244* 184*   < > 256*    < > = values in this interval not displayed.     Results from last 7 days   Lab Units 03/24/24  1452 03/21/24  1142   INR  1.82* 1.75*     Lab Results   Lab Value Date/Time    LIPASE 35 12/01/2023 1204    LIPASE 42 05/18/2023 1433    LIPASE 63 (H) 03/24/2023 1040    LIPASE 38 02/05/2023 1532    LIPASE 40 12/18/2022 0238    LIPASE 55 11/04/2022 1731    LIPASE 68 (H) 10/31/2022 0924    LIPASE 52 06/27/2022 1449    LIPASE 20 08/03/2021 1206    LIPASE 28 06/01/2021 1622       Radiology:    No radiology results for the last day      Assessment & Plan   Assessment:   Anemia      Plan:   Patient is being discharged today-hemoglobin stable and APC used to treat gastric antral vascular ectasia on EGD  Patient will need to continue lactulose, rifaximin, Coreg, Lasix, and spironolactone  Continue oral supplement  Patient understands and agrees to the plan    I discussed the patients findings and my recommendations with patient.      Electronically signed by BESSIE Santana, 3/25/2024, 12:53 EDT.    Attending Attestation  I reviewed the below documentation and evaluation and discussed the care plan with BESSIE Santana, I agree with her findings and plan as documented.    Pt seen and examined by me.  Pt reports no new issues over night.  Abd soft, nd, left sided TTP.  Hgb improved post-transfusion.  Continue home meds.  F/u with GI as already scheduled.    Electronically signed by Trena Coombs MD,  03/25/24, 3:32 PM EDT.    Portions of this documentation were transcribed electronically from a voice recognition software.  I confirm all data accurately represents the service(s) I performed at today's visit.

## 2024-03-25 NOTE — SIGNIFICANT NOTE
Patient meant to discharge this evening following blood transfusion. Unfortunately blood was not complete until roughly 2100.     Patient's sister is his source of transportation to [place] [facility]". She is unable to come get patient so late. Will allow patient to stay and DC in a.m. when sister can provider transportation.     Electronically signed by SOLEDAD Haider, 03/24/24, 10:44 PM EDT.

## 2024-03-26 LAB
BACTERIA SPEC AEROBE CULT: NORMAL
BACTERIA SPEC AEROBE CULT: NORMAL

## 2024-04-30 ENCOUNTER — OFFICE VISIT (OUTPATIENT)
Dept: GASTROENTEROLOGY | Facility: CLINIC | Age: 59
End: 2024-04-30
Payer: MEDICAID

## 2024-04-30 VITALS
WEIGHT: 286 LBS | SYSTOLIC BLOOD PRESSURE: 169 MMHG | DIASTOLIC BLOOD PRESSURE: 83 MMHG | OXYGEN SATURATION: 97 % | HEART RATE: 84 BPM | HEIGHT: 68 IN | BODY MASS INDEX: 43.35 KG/M2

## 2024-04-30 DIAGNOSIS — K74.60 LIVER CIRRHOSIS SECONDARY TO NASH (NONALCOHOLIC STEATOHEPATITIS): Primary | Chronic | ICD-10-CM

## 2024-04-30 DIAGNOSIS — K76.82 HEPATIC ENCEPHALOPATHY: Chronic | ICD-10-CM

## 2024-04-30 DIAGNOSIS — K21.9 GASTROESOPHAGEAL REFLUX DISEASE WITHOUT ESOPHAGITIS: ICD-10-CM

## 2024-04-30 DIAGNOSIS — K27.4 GASTROINTESTINAL HEMORRHAGE ASSOCIATED WITH PEPTIC ULCER: ICD-10-CM

## 2024-04-30 DIAGNOSIS — K75.81 LIVER CIRRHOSIS SECONDARY TO NASH (NONALCOHOLIC STEATOHEPATITIS): Primary | Chronic | ICD-10-CM

## 2024-04-30 PROCEDURE — 99214 OFFICE O/P EST MOD 30 MIN: CPT | Performed by: INTERNAL MEDICINE

## 2024-04-30 PROCEDURE — 3077F SYST BP >= 140 MM HG: CPT | Performed by: INTERNAL MEDICINE

## 2024-04-30 PROCEDURE — 3079F DIAST BP 80-89 MM HG: CPT | Performed by: INTERNAL MEDICINE

## 2024-04-30 RX ORDER — FERROUS GLUCONATE 324(38)MG
TABLET ORAL
COMMUNITY
Start: 2024-04-23
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
## 2024-05-13 ENCOUNTER — HOSPITAL ENCOUNTER (INPATIENT)
Facility: HOSPITAL | Age: 59
LOS: 12 days | Discharge: NURSING, REHAB OR LONG-TERM CARE | End: 2024-05-29
Attending: EMERGENCY MEDICINE | Admitting: INTERNAL MEDICINE
Payer: MEDICAID

## 2024-05-13 ENCOUNTER — APPOINTMENT (OUTPATIENT)
Dept: GENERAL RADIOLOGY | Facility: HOSPITAL | Age: 59
End: 2024-05-13
Payer: MEDICAID

## 2024-05-13 ENCOUNTER — APPOINTMENT (OUTPATIENT)
Dept: CT IMAGING | Facility: HOSPITAL | Age: 59
End: 2024-05-13
Payer: MEDICAID

## 2024-05-13 DIAGNOSIS — R31.9 URINARY TRACT INFECTION WITH HEMATURIA, SITE UNSPECIFIED: ICD-10-CM

## 2024-05-13 DIAGNOSIS — N39.0 URINARY TRACT INFECTION WITH HEMATURIA, SITE UNSPECIFIED: ICD-10-CM

## 2024-05-13 DIAGNOSIS — R13.12 DYSPHAGIA, OROPHARYNGEAL: ICD-10-CM

## 2024-05-13 DIAGNOSIS — K76.82 HEPATIC ENCEPHALOPATHY: Primary | ICD-10-CM

## 2024-05-13 LAB
ALBUMIN SERPL-MCNC: 3.3 G/DL (ref 3.5–5.2)
ALBUMIN/GLOB SERPL: 1.1 G/DL
ALP SERPL-CCNC: 89 U/L (ref 39–117)
ALT SERPL W P-5'-P-CCNC: 22 U/L (ref 1–41)
AMMONIA BLD-SCNC: 96 UMOL/L (ref 16–60)
ANION GAP SERPL CALCULATED.3IONS-SCNC: 12.6 MMOL/L (ref 5–15)
AST SERPL-CCNC: 48 U/L (ref 1–40)
BACTERIA UR QL AUTO: ABNORMAL /HPF
BASOPHILS # BLD AUTO: 0.04 10*3/MM3 (ref 0–0.2)
BASOPHILS NFR BLD AUTO: 0.8 % (ref 0–1.5)
BILIRUB SERPL-MCNC: 2.4 MG/DL (ref 0–1.2)
BILIRUB UR QL STRIP: ABNORMAL
BUN SERPL-MCNC: 19 MG/DL (ref 6–20)
BUN/CREAT SERPL: 22.1 (ref 7–25)
CALCIUM SPEC-SCNC: 8.9 MG/DL (ref 8.6–10.5)
CHLORIDE SERPL-SCNC: 104 MMOL/L (ref 98–107)
CLARITY UR: ABNORMAL
CO2 SERPL-SCNC: 22.4 MMOL/L (ref 22–29)
COLOR UR: ABNORMAL
CREAT SERPL-MCNC: 0.86 MG/DL (ref 0.76–1.27)
DEPRECATED RDW RBC AUTO: 58.5 FL (ref 37–54)
EGFRCR SERPLBLD CKD-EPI 2021: 100.4 ML/MIN/1.73
EOSINOPHIL # BLD AUTO: 0.17 10*3/MM3 (ref 0–0.4)
EOSINOPHIL NFR BLD AUTO: 3.3 % (ref 0.3–6.2)
ERYTHROCYTE [DISTWIDTH] IN BLOOD BY AUTOMATED COUNT: 18.9 % (ref 12.3–15.4)
FLUAV SUBTYP SPEC NAA+PROBE: NOT DETECTED
FLUBV RNA ISLT QL NAA+PROBE: NOT DETECTED
GLOBULIN UR ELPH-MCNC: 3 GM/DL
GLUCOSE BLDC GLUCOMTR-MCNC: 111 MG/DL (ref 70–99)
GLUCOSE SERPL-MCNC: 143 MG/DL (ref 65–99)
GLUCOSE UR STRIP-MCNC: NEGATIVE MG/DL
HCT VFR BLD AUTO: 33.4 % (ref 37.5–51)
HGB BLD-MCNC: 10.7 G/DL (ref 13–17.7)
HGB UR QL STRIP.AUTO: ABNORMAL
HOLD SPECIMEN: NORMAL
HOLD SPECIMEN: NORMAL
HYALINE CASTS UR QL AUTO: ABNORMAL /LPF
IMM GRANULOCYTES # BLD AUTO: 0.03 10*3/MM3 (ref 0–0.05)
IMM GRANULOCYTES NFR BLD AUTO: 0.6 % (ref 0–0.5)
INR PPP: 1.74 (ref 0.86–1.15)
KETONES UR QL STRIP: ABNORMAL
LEUKOCYTE ESTERASE UR QL STRIP.AUTO: ABNORMAL
LYMPHOCYTES # BLD AUTO: 0.85 10*3/MM3 (ref 0.7–3.1)
LYMPHOCYTES NFR BLD AUTO: 16.5 % (ref 19.6–45.3)
MAGNESIUM SERPL-MCNC: 1.7 MG/DL (ref 1.6–2.6)
MCH RBC QN AUTO: 27.2 PG (ref 26.6–33)
MCHC RBC AUTO-ENTMCNC: 32 G/DL (ref 31.5–35.7)
MCV RBC AUTO: 85 FL (ref 79–97)
MONOCYTES # BLD AUTO: 0.46 10*3/MM3 (ref 0.1–0.9)
MONOCYTES NFR BLD AUTO: 8.9 % (ref 5–12)
NEUTROPHILS NFR BLD AUTO: 3.61 10*3/MM3 (ref 1.7–7)
NEUTROPHILS NFR BLD AUTO: 69.9 % (ref 42.7–76)
NITRITE UR QL STRIP: POSITIVE
NRBC BLD AUTO-RTO: 0 /100 WBC (ref 0–0.2)
PH UR STRIP.AUTO: 7 [PH] (ref 5–8)
PLATELET # BLD AUTO: 92 10*3/MM3 (ref 140–450)
PMV BLD AUTO: 11.6 FL (ref 6–12)
POTASSIUM SERPL-SCNC: 4 MMOL/L (ref 3.5–5.2)
PROT SERPL-MCNC: 6.3 G/DL (ref 6–8.5)
PROT UR QL STRIP: ABNORMAL
PROTHROMBIN TIME: 20.7 SECONDS (ref 11.8–14.9)
QT INTERVAL: 381 MS
QTC INTERVAL: 455 MS
RBC # BLD AUTO: 3.93 10*6/MM3 (ref 4.14–5.8)
RBC # UR STRIP: ABNORMAL /HPF
REF LAB TEST METHOD: ABNORMAL
RSV RNA NPH QL NAA+NON-PROBE: NOT DETECTED
SARS-COV-2 RNA RESP QL NAA+PROBE: NOT DETECTED
SODIUM SERPL-SCNC: 139 MMOL/L (ref 136–145)
SP GR UR STRIP: 1.02 (ref 1–1.03)
SQUAMOUS #/AREA URNS HPF: ABNORMAL /HPF
TROPONIN T SERPL HS-MCNC: 26 NG/L
UROBILINOGEN UR QL STRIP: ABNORMAL
WBC # UR STRIP: ABNORMAL /HPF
WBC NRBC COR # BLD AUTO: 5.16 10*3/MM3 (ref 3.4–10.8)
WHOLE BLOOD HOLD COAG: NORMAL
WHOLE BLOOD HOLD SPECIMEN: NORMAL

## 2024-05-13 PROCEDURE — G0378 HOSPITAL OBSERVATION PER HR: HCPCS

## 2024-05-13 PROCEDURE — 25810000003 SODIUM CHLORIDE 0.9 % SOLUTION

## 2024-05-13 PROCEDURE — 25010000002 ENOXAPARIN PER 10 MG: Performed by: STUDENT IN AN ORGANIZED HEALTH CARE EDUCATION/TRAINING PROGRAM

## 2024-05-13 PROCEDURE — 81001 URINALYSIS AUTO W/SCOPE: CPT | Performed by: EMERGENCY MEDICINE

## 2024-05-13 PROCEDURE — 99223 1ST HOSP IP/OBS HIGH 75: CPT | Performed by: INTERNAL MEDICINE

## 2024-05-13 PROCEDURE — 82948 REAGENT STRIP/BLOOD GLUCOSE: CPT

## 2024-05-13 PROCEDURE — 25010000002 ONDANSETRON PER 1 MG

## 2024-05-13 PROCEDURE — 25010000002 CEFTRIAXONE PER 250 MG

## 2024-05-13 PROCEDURE — 87086 URINE CULTURE/COLONY COUNT: CPT | Performed by: INTERNAL MEDICINE

## 2024-05-13 PROCEDURE — 25010000002 ENOXAPARIN PER 10 MG: Performed by: INTERNAL MEDICINE

## 2024-05-13 PROCEDURE — 70450 CT HEAD/BRAIN W/O DYE: CPT

## 2024-05-13 PROCEDURE — 84484 ASSAY OF TROPONIN QUANT: CPT

## 2024-05-13 PROCEDURE — 85610 PROTHROMBIN TIME: CPT | Performed by: INTERNAL MEDICINE

## 2024-05-13 PROCEDURE — 80053 COMPREHEN METABOLIC PANEL: CPT

## 2024-05-13 PROCEDURE — 85025 COMPLETE CBC W/AUTO DIFF WBC: CPT

## 2024-05-13 PROCEDURE — 99285 EMERGENCY DEPT VISIT HI MDM: CPT

## 2024-05-13 PROCEDURE — 93005 ELECTROCARDIOGRAM TRACING: CPT

## 2024-05-13 PROCEDURE — 87637 SARSCOV2&INF A&B&RSV AMP PRB: CPT | Performed by: INTERNAL MEDICINE

## 2024-05-13 PROCEDURE — 93005 ELECTROCARDIOGRAM TRACING: CPT | Performed by: EMERGENCY MEDICINE

## 2024-05-13 PROCEDURE — 83735 ASSAY OF MAGNESIUM: CPT

## 2024-05-13 PROCEDURE — 87040 BLOOD CULTURE FOR BACTERIA: CPT

## 2024-05-13 PROCEDURE — 82140 ASSAY OF AMMONIA: CPT

## 2024-05-13 PROCEDURE — 36415 COLL VENOUS BLD VENIPUNCTURE: CPT

## 2024-05-13 PROCEDURE — 71045 X-RAY EXAM CHEST 1 VIEW: CPT

## 2024-05-13 RX ORDER — ENOXAPARIN SODIUM 100 MG/ML
40 INJECTION SUBCUTANEOUS EVERY 12 HOURS
Status: DISCONTINUED | OUTPATIENT
Start: 2024-05-13 | End: 2024-05-29 | Stop reason: HOSPADM

## 2024-05-13 RX ORDER — INSULIN LISPRO 100 [IU]/ML
2-7 INJECTION, SOLUTION INTRAVENOUS; SUBCUTANEOUS
Status: DISCONTINUED | OUTPATIENT
Start: 2024-05-13 | End: 2024-05-29 | Stop reason: HOSPADM

## 2024-05-13 RX ORDER — SODIUM CHLORIDE 9 MG/ML
40 INJECTION, SOLUTION INTRAVENOUS AS NEEDED
Status: DISCONTINUED | OUTPATIENT
Start: 2024-05-13 | End: 2024-05-29 | Stop reason: HOSPADM

## 2024-05-13 RX ORDER — BUSPIRONE HYDROCHLORIDE 15 MG/1
7.5 TABLET ORAL 3 TIMES DAILY
Status: DISCONTINUED | OUTPATIENT
Start: 2024-05-13 | End: 2024-05-19

## 2024-05-13 RX ORDER — ENOXAPARIN SODIUM 100 MG/ML
40 INJECTION SUBCUTANEOUS EVERY 24 HOURS
Status: DISCONTINUED | OUTPATIENT
Start: 2024-05-13 | End: 2024-05-13 | Stop reason: DRUGHIGH

## 2024-05-13 RX ORDER — LACTULOSE 10 G/15ML
20 SOLUTION ORAL EVERY 4 HOURS
Status: DISCONTINUED | OUTPATIENT
Start: 2024-05-13 | End: 2024-05-14

## 2024-05-13 RX ORDER — SODIUM CHLORIDE 0.9 % (FLUSH) 0.9 %
10 SYRINGE (ML) INJECTION AS NEEDED
Status: DISCONTINUED | OUTPATIENT
Start: 2024-05-13 | End: 2024-05-29 | Stop reason: HOSPADM

## 2024-05-13 RX ORDER — ONDANSETRON 2 MG/ML
4 INJECTION INTRAMUSCULAR; INTRAVENOUS ONCE
Status: COMPLETED | OUTPATIENT
Start: 2024-05-13 | End: 2024-05-13

## 2024-05-13 RX ORDER — SODIUM CHLORIDE 0.9 % (FLUSH) 0.9 %
10 SYRINGE (ML) INJECTION EVERY 12 HOURS SCHEDULED
Status: DISCONTINUED | OUTPATIENT
Start: 2024-05-13 | End: 2024-05-29 | Stop reason: HOSPADM

## 2024-05-13 RX ORDER — NICOTINE POLACRILEX 4 MG
15 LOZENGE BUCCAL
Status: DISCONTINUED | OUTPATIENT
Start: 2024-05-13 | End: 2024-05-29 | Stop reason: HOSPADM

## 2024-05-13 RX ORDER — IBUPROFEN 600 MG/1
1 TABLET ORAL
Status: DISCONTINUED | OUTPATIENT
Start: 2024-05-13 | End: 2024-05-29 | Stop reason: HOSPADM

## 2024-05-13 RX ORDER — PANTOPRAZOLE SODIUM 40 MG/1
40 TABLET, DELAYED RELEASE ORAL DAILY
COMMUNITY
Start: 2024-03-20

## 2024-05-13 RX ORDER — CARVEDILOL 6.25 MG/1
6.25 TABLET ORAL 2 TIMES DAILY WITH MEALS
Status: DISCONTINUED | OUTPATIENT
Start: 2024-05-13 | End: 2024-05-19

## 2024-05-13 RX ORDER — DEXTROSE MONOHYDRATE 25 G/50ML
25 INJECTION, SOLUTION INTRAVENOUS
Status: DISCONTINUED | OUTPATIENT
Start: 2024-05-13 | End: 2024-05-29 | Stop reason: HOSPADM

## 2024-05-13 RX ADMIN — BUSPIRONE HYDROCHLORIDE 7.5 MG: 7.5 TABLET ORAL at 23:06

## 2024-05-13 RX ADMIN — Medication 10 ML: at 22:46

## 2024-05-13 RX ADMIN — SODIUM CHLORIDE 1000 ML: 9 INJECTION, SOLUTION INTRAVENOUS at 18:14

## 2024-05-13 RX ADMIN — RIFAXIMIN 550 MG: 550 TABLET ORAL at 22:45

## 2024-05-13 RX ADMIN — ENOXAPARIN SODIUM 40 MG: 100 INJECTION SUBCUTANEOUS at 22:45

## 2024-05-13 RX ADMIN — LACTULOSE 20 G: 20 SOLUTION ORAL at 22:46

## 2024-05-13 RX ADMIN — CARVEDILOL 6.25 MG: 6.25 TABLET, FILM COATED ORAL at 22:45

## 2024-05-13 RX ADMIN — CEFTRIAXONE SODIUM 2000 MG: 2 INJECTION, POWDER, FOR SOLUTION INTRAMUSCULAR; INTRAVENOUS at 19:41

## 2024-05-13 RX ADMIN — ONDANSETRON 4 MG: 2 INJECTION INTRAMUSCULAR; INTRAVENOUS at 18:13

## 2024-05-13 NOTE — ED NOTES
New gown/ full bed change completed for patient. Patient urinated in an emesis bag and then spilled it on the bed/ floor.

## 2024-05-13 NOTE — H&P
[facility]   Patient Name: Nic Nicolas  : 1965  MRN: [id number]  Primary Care Physician:  Sheldon Carcamo MD  Date of admission: 2024    Subjective   Subjective     Chief Complaint: Confusion    HPI:    Nic Nicolas is a 59 y.o. male past medical history of cirrhosis of the liver due to ROMO can have a hepatic encephalopathy, DVT in the past, diabetes on insulin, hypertension, obstructive sleep apnea, TBI, and asthma/COPD who presents with confusion    History is difficult to obtain from the patient as he is slightly confused.  This keeps tell me how badly that the nursing home treats him.  It sounds like he has not been feeling well for a couple of days.  As result the nursing home brought him into the emergency department for further evaluation.    Emergency department patient vital signs are as follows: Temperature 99.1, pulse 89, respiratory 20, blood pressure 147/87, 97% on room air.  CBC shows no leukocytosis but does show a hemoglobin of 10.7 which is above his baseline.  CMP shows a slightly elevated bilirubin at 2.4.  Ammonia level is 96.  Analysis shows moderate blood, positive nitrite, small leuk esterase trace bacteria.  CT of the head shows some frontal lobe atrophy.  Patient was given 2 g of ceftriaxone.  He will be admitted to hospital for hepatic encephalopathy most likely due to urinary tract infection.    Cannot review systems    Personal History     Past Medical History:  ROMO cirrhosis history of hepatic encephalopathy  Diabetes on insulin  DVT in the past  Hypertension  Obstructive sleep apnea  TBI  Asthma/COPD  GERD  Anxiety/depression    Past Surgical History:  Colonoscopy  Endoscopy  Knee surgery  Pelvic fracture surger    Family History:   Diabetes  Lung cancer    Social History:   Never smoked.  Never drank.    Home Medications:  Diclofenac Sodium, Insulin Lispro (1 Unit Dial), acetaminophen, albuterol  sulfate HFA, busPIRone, carvedilol, cetirizine, cholecalciferol, diphenhydrAMINE, ferrous gluconate, fluticasone, furosemide, insulin detemir, ipratropium-albuterol, lactulose, magnesium oxide, naloxone, nystatin, oxyCODONE, polyethyl glycol-propyl glycol, rOPINIRole, riFAXIMin, sertraline, simethicone, spironolactone, and sucralfate    Allergies:  No Known Allergies      Objective   Objective     Vitals:   Temp:  [99.1 °F (37.3 °C)] 99.1 °F (37.3 °C)  Heart Rate:  [86-96] 89  Resp:  [18-21] 21  BP: (147)/(87) 147/87    Physical Exam    Constitutional: Awake, alert, no acute distress   Eyes: Pupils equal, sclerae anicteric, no conjunctival injection   HENT: NCAT, mucous membranes moist   Neck: Supple, no thyromegaly, no lymphadenopathy, trachea midline   Respiratory: Clear to auscultation bilaterally, nonlabored respirations    Cardiovascular: RRR, no murmurs, rubs, or gallops, palpable pedal pulses bilaterally   Gastrointestinal: Positive bowel sounds, soft, nontender, nondistended   Musculoskeletal: No bilateral ankle edema, no clubbing or cyanosis to extremities   Psychiatric: Appropriate affect, cooperative   Neurologic: Oriented x 1-2, strength symmetric in all extremities, Cranial Nerves grossly intact to confrontation, speech clear   Skin: No rashes     Result Review    Result Review:  I have personally reviewed the results from the time of this admission to 5/13/2024 18:34 EDT and agree with these findings:  Imaging and labs reviewed    Assessment & Plan   Assessment / Plan     Assessment/Plan:   Hepatic encephalopathy with ammonia of 96  Urinary tract infection  Obesity BMI 43  DVT in the past  Diabetes    Plan:  --Admit to hospital service  --Continue ceftriaxone for UTI  --Follow blood culture  --Follow urine culture  --Lactulose every 4 hours  --Restart rifaximin  -- Will hold Lasix and spironolactone  -- Hold sedating medications such as Benadryl, ropinirole and oxycodone as these can exacerbate hepatic  encephalopathy    DVT prophylaxis:  Lovenox        CODE STATUS:     Full code    Admission Status:  I believe this patient meets admission status.    Electronically signed by Daniel Berrios MD, 05/13/24, 6:34 PM EDT.
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
## 2024-05-14 LAB
ALBUMIN SERPL-MCNC: 2.9 G/DL (ref 3.5–5.2)
ALBUMIN/GLOB SERPL: 1.1 G/DL
ALP SERPL-CCNC: 79 U/L (ref 39–117)
ALT SERPL W P-5'-P-CCNC: 19 U/L (ref 1–41)
ANION GAP SERPL CALCULATED.3IONS-SCNC: 11.3 MMOL/L (ref 5–15)
AST SERPL-CCNC: 39 U/L (ref 1–40)
BASOPHILS # BLD AUTO: 0.03 10*3/MM3 (ref 0–0.2)
BASOPHILS NFR BLD AUTO: 0.8 % (ref 0–1.5)
BILIRUB SERPL-MCNC: 1.9 MG/DL (ref 0–1.2)
BUN SERPL-MCNC: 17 MG/DL (ref 6–20)
BUN/CREAT SERPL: 20 (ref 7–25)
CALCIUM SPEC-SCNC: 8.8 MG/DL (ref 8.6–10.5)
CHLORIDE SERPL-SCNC: 108 MMOL/L (ref 98–107)
CO2 SERPL-SCNC: 21.7 MMOL/L (ref 22–29)
CREAT SERPL-MCNC: 0.85 MG/DL (ref 0.76–1.27)
DEPRECATED RDW RBC AUTO: 60.1 FL (ref 37–54)
EGFRCR SERPLBLD CKD-EPI 2021: 100.7 ML/MIN/1.73
EOSINOPHIL # BLD AUTO: 0.12 10*3/MM3 (ref 0–0.4)
EOSINOPHIL NFR BLD AUTO: 3.2 % (ref 0.3–6.2)
ERYTHROCYTE [DISTWIDTH] IN BLOOD BY AUTOMATED COUNT: 19.2 % (ref 12.3–15.4)
GLOBULIN UR ELPH-MCNC: 2.7 GM/DL
GLUCOSE BLDC GLUCOMTR-MCNC: 143 MG/DL (ref 70–99)
GLUCOSE BLDC GLUCOMTR-MCNC: 158 MG/DL (ref 70–99)
GLUCOSE BLDC GLUCOMTR-MCNC: 191 MG/DL (ref 70–99)
GLUCOSE BLDC GLUCOMTR-MCNC: 243 MG/DL (ref 70–99)
GLUCOSE SERPL-MCNC: 135 MG/DL (ref 65–99)
HCT VFR BLD AUTO: 30 % (ref 37.5–51)
HGB BLD-MCNC: 9.6 G/DL (ref 13–17.7)
IMM GRANULOCYTES # BLD AUTO: 0.01 10*3/MM3 (ref 0–0.05)
IMM GRANULOCYTES NFR BLD AUTO: 0.3 % (ref 0–0.5)
LYMPHOCYTES # BLD AUTO: 0.87 10*3/MM3 (ref 0.7–3.1)
LYMPHOCYTES NFR BLD AUTO: 22.9 % (ref 19.6–45.3)
MAGNESIUM SERPL-MCNC: 1.8 MG/DL (ref 1.6–2.6)
MCH RBC QN AUTO: 27.7 PG (ref 26.6–33)
MCHC RBC AUTO-ENTMCNC: 32 G/DL (ref 31.5–35.7)
MCV RBC AUTO: 86.7 FL (ref 79–97)
MONOCYTES # BLD AUTO: 0.41 10*3/MM3 (ref 0.1–0.9)
MONOCYTES NFR BLD AUTO: 10.8 % (ref 5–12)
NEUTROPHILS NFR BLD AUTO: 2.36 10*3/MM3 (ref 1.7–7)
NEUTROPHILS NFR BLD AUTO: 62 % (ref 42.7–76)
NRBC BLD AUTO-RTO: 0 /100 WBC (ref 0–0.2)
PLATELET # BLD AUTO: 67 10*3/MM3 (ref 140–450)
PMV BLD AUTO: 11.8 FL (ref 6–12)
POTASSIUM SERPL-SCNC: 3.6 MMOL/L (ref 3.5–5.2)
PROT SERPL-MCNC: 5.6 G/DL (ref 6–8.5)
RBC # BLD AUTO: 3.46 10*6/MM3 (ref 4.14–5.8)
SODIUM SERPL-SCNC: 141 MMOL/L (ref 136–145)
WBC NRBC COR # BLD AUTO: 3.8 10*3/MM3 (ref 3.4–10.8)

## 2024-05-14 PROCEDURE — 99233 SBSQ HOSP IP/OBS HIGH 50: CPT | Performed by: INTERNAL MEDICINE

## 2024-05-14 PROCEDURE — 85025 COMPLETE CBC W/AUTO DIFF WBC: CPT | Performed by: INTERNAL MEDICINE

## 2024-05-14 PROCEDURE — 63710000001 INSULIN LISPRO (HUMAN) PER 5 UNITS: Performed by: INTERNAL MEDICINE

## 2024-05-14 PROCEDURE — 25010000002 ENOXAPARIN PER 10 MG: Performed by: STUDENT IN AN ORGANIZED HEALTH CARE EDUCATION/TRAINING PROGRAM

## 2024-05-14 PROCEDURE — 82948 REAGENT STRIP/BLOOD GLUCOSE: CPT

## 2024-05-14 PROCEDURE — 83735 ASSAY OF MAGNESIUM: CPT | Performed by: INTERNAL MEDICINE

## 2024-05-14 PROCEDURE — 82948 REAGENT STRIP/BLOOD GLUCOSE: CPT | Performed by: INTERNAL MEDICINE

## 2024-05-14 PROCEDURE — 80053 COMPREHEN METABOLIC PANEL: CPT | Performed by: INTERNAL MEDICINE

## 2024-05-14 PROCEDURE — 25010000002 ENOXAPARIN PER 10 MG: Performed by: INTERNAL MEDICINE

## 2024-05-14 PROCEDURE — 25010000002 CEFTRIAXONE PER 250 MG: Performed by: INTERNAL MEDICINE

## 2024-05-14 PROCEDURE — G0378 HOSPITAL OBSERVATION PER HR: HCPCS

## 2024-05-14 RX ORDER — ACETAMINOPHEN 325 MG/1
650 TABLET ORAL EVERY 8 HOURS PRN
Status: DISCONTINUED | OUTPATIENT
Start: 2024-05-14 | End: 2024-05-19

## 2024-05-14 RX ORDER — FUROSEMIDE 40 MG/1
40 TABLET ORAL
Status: DISCONTINUED | OUTPATIENT
Start: 2024-05-14 | End: 2024-05-17

## 2024-05-14 RX ORDER — LACTULOSE 10 G/15ML
20 SOLUTION ORAL 2 TIMES DAILY
Status: DISCONTINUED | OUTPATIENT
Start: 2024-05-14 | End: 2024-05-18

## 2024-05-14 RX ORDER — PANTOPRAZOLE SODIUM 40 MG/1
40 TABLET, DELAYED RELEASE ORAL DAILY
Status: DISCONTINUED | OUTPATIENT
Start: 2024-05-14 | End: 2024-05-19

## 2024-05-14 RX ORDER — CALCIUM CARBONATE 500 MG/1
1 TABLET, CHEWABLE ORAL 3 TIMES DAILY PRN
Status: DISCONTINUED | OUTPATIENT
Start: 2024-05-14 | End: 2024-05-19

## 2024-05-14 RX ORDER — MELATONIN
1000 DAILY
Status: DISCONTINUED | OUTPATIENT
Start: 2024-05-14 | End: 2024-05-19

## 2024-05-14 RX ADMIN — INSULIN LISPRO 2 UNITS: 100 INJECTION, SOLUTION INTRAVENOUS; SUBCUTANEOUS at 17:52

## 2024-05-14 RX ADMIN — BUSPIRONE HYDROCHLORIDE 7.5 MG: 7.5 TABLET ORAL at 21:35

## 2024-05-14 RX ADMIN — LACTULOSE 20 G: 20 SOLUTION ORAL at 21:35

## 2024-05-14 RX ADMIN — LACTULOSE 20 G: 20 SOLUTION ORAL at 08:33

## 2024-05-14 RX ADMIN — Medication 1000 UNITS: at 08:33

## 2024-05-14 RX ADMIN — RIFAXIMIN 550 MG: 550 TABLET ORAL at 21:35

## 2024-05-14 RX ADMIN — LACTULOSE 20 G: 20 SOLUTION ORAL at 01:53

## 2024-05-14 RX ADMIN — BUSPIRONE HYDROCHLORIDE 7.5 MG: 7.5 TABLET ORAL at 10:29

## 2024-05-14 RX ADMIN — BUSPIRONE HYDROCHLORIDE 7.5 MG: 7.5 TABLET ORAL at 17:51

## 2024-05-14 RX ADMIN — Medication 10 ML: at 21:35

## 2024-05-14 RX ADMIN — FUROSEMIDE 40 MG: 40 TABLET ORAL at 17:51

## 2024-05-14 RX ADMIN — RIFAXIMIN 550 MG: 550 TABLET ORAL at 08:33

## 2024-05-14 RX ADMIN — ACETAMINOPHEN 650 MG: 325 TABLET ORAL at 10:27

## 2024-05-14 RX ADMIN — Medication 10 ML: at 08:34

## 2024-05-14 RX ADMIN — ENOXAPARIN SODIUM 40 MG: 100 INJECTION SUBCUTANEOUS at 13:00

## 2024-05-14 RX ADMIN — ENOXAPARIN SODIUM 40 MG: 100 INJECTION SUBCUTANEOUS at 21:35

## 2024-05-14 RX ADMIN — LACTULOSE 20 G: 20 SOLUTION ORAL at 05:07

## 2024-05-14 RX ADMIN — PANTOPRAZOLE SODIUM 40 MG: 40 TABLET, DELAYED RELEASE ORAL at 08:33

## 2024-05-14 RX ADMIN — INSULIN LISPRO 3 UNITS: 100 INJECTION, SOLUTION INTRAVENOUS; SUBCUTANEOUS at 21:35

## 2024-05-14 RX ADMIN — SERTRALINE HYDROCHLORIDE 100 MG: 100 TABLET, FILM COATED ORAL at 21:35

## 2024-05-14 RX ADMIN — CEFTRIAXONE SODIUM 2000 MG: 2 INJECTION, POWDER, FOR SOLUTION INTRAMUSCULAR; INTRAVENOUS at 17:52

## 2024-05-14 RX ADMIN — CARVEDILOL 6.25 MG: 6.25 TABLET, FILM COATED ORAL at 17:52

## 2024-05-14 NOTE — PLAN OF CARE
Goal Outcome Evaluation:         Patient complained of pain, and medicated per orders, vitals stable, very emotional today.

## 2024-05-14 NOTE — ED NOTES
PATIENT REQUEST FOR HIS MONEY TO PUT AWAY SO I CALLED SECURITY TO ASSIST. SWAPNA IN SECURITY CAME TO ASSIST WITH PLACING MONEY IN SAFE. PATIENT THEN REFUSED. UNKNOWN AMOUNT OF MONEY BECAUSE PATIENT DID NOT ALLOW SECURITY TO PLACE IN THE SAFE. PATIENT DID NOT HAVE A  CELL-PHONE IN HIS POSSESSION.

## 2024-05-14 NOTE — PLAN OF CARE
Goal Outcome Evaluation:  Plan of Care Reviewed With: patient        Progress: no change  Outcome Evaluation: Pt is AOx4 throughout the shift, with bouts of confusion and forgetfullness. Pt was anxious and suspicious about his cash, it was placed in his t-shirt pocket on his person. Pt complaints of pain during the shift, MD did not want pain meds ordered so non-pharmacological interventions were utilized, pillows and position were adjusted as needed. Pt appears to be in no apparent distress at this time, denies any current needs, and has call light within reach.
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
## 2024-05-15 ENCOUNTER — APPOINTMENT (OUTPATIENT)
Dept: INTERVENTIONAL RADIOLOGY/VASCULAR | Facility: HOSPITAL | Age: 59
End: 2024-05-15
Payer: MEDICAID

## 2024-05-15 LAB
ALBUMIN SERPL-MCNC: 3.2 G/DL (ref 3.5–5.2)
ALBUMIN/GLOB SERPL: 1.1 G/DL
ALP SERPL-CCNC: 81 U/L (ref 39–117)
ALT SERPL W P-5'-P-CCNC: 20 U/L (ref 1–41)
ANION GAP SERPL CALCULATED.3IONS-SCNC: 13.6 MMOL/L (ref 5–15)
AST SERPL-CCNC: 37 U/L (ref 1–40)
BACTERIA SPEC AEROBE CULT: NORMAL
BILIRUB SERPL-MCNC: 1.4 MG/DL (ref 0–1.2)
BUN SERPL-MCNC: 19 MG/DL (ref 6–20)
BUN/CREAT SERPL: 22.1 (ref 7–25)
CALCIUM SPEC-SCNC: 9.1 MG/DL (ref 8.6–10.5)
CHLORIDE SERPL-SCNC: 105 MMOL/L (ref 98–107)
CO2 SERPL-SCNC: 22.4 MMOL/L (ref 22–29)
CREAT SERPL-MCNC: 0.86 MG/DL (ref 0.76–1.27)
DEPRECATED RDW RBC AUTO: 61.1 FL (ref 37–54)
EGFRCR SERPLBLD CKD-EPI 2021: 100.4 ML/MIN/1.73
ERYTHROCYTE [DISTWIDTH] IN BLOOD BY AUTOMATED COUNT: 19.3 % (ref 12.3–15.4)
GLOBULIN UR ELPH-MCNC: 2.9 GM/DL
GLUCOSE BLDC GLUCOMTR-MCNC: 152 MG/DL (ref 70–99)
GLUCOSE BLDC GLUCOMTR-MCNC: 175 MG/DL (ref 70–99)
GLUCOSE BLDC GLUCOMTR-MCNC: 201 MG/DL (ref 70–99)
GLUCOSE BLDC GLUCOMTR-MCNC: 204 MG/DL (ref 70–99)
GLUCOSE SERPL-MCNC: 142 MG/DL (ref 65–99)
HCT VFR BLD AUTO: 31.9 % (ref 37.5–51)
HGB BLD-MCNC: 10.1 G/DL (ref 13–17.7)
MCH RBC QN AUTO: 27.6 PG (ref 26.6–33)
MCHC RBC AUTO-ENTMCNC: 31.7 G/DL (ref 31.5–35.7)
MCV RBC AUTO: 87.2 FL (ref 79–97)
PLATELET # BLD AUTO: 69 10*3/MM3 (ref 140–450)
PMV BLD AUTO: 11.7 FL (ref 6–12)
POTASSIUM SERPL-SCNC: 4 MMOL/L (ref 3.5–5.2)
PROT SERPL-MCNC: 6.1 G/DL (ref 6–8.5)
RBC # BLD AUTO: 3.66 10*6/MM3 (ref 4.14–5.8)
SODIUM SERPL-SCNC: 141 MMOL/L (ref 136–145)
WBC NRBC COR # BLD AUTO: 4.27 10*3/MM3 (ref 3.4–10.8)

## 2024-05-15 PROCEDURE — 82948 REAGENT STRIP/BLOOD GLUCOSE: CPT

## 2024-05-15 PROCEDURE — 25010000002 ENOXAPARIN PER 10 MG: Performed by: INTERNAL MEDICINE

## 2024-05-15 PROCEDURE — 82948 REAGENT STRIP/BLOOD GLUCOSE: CPT | Performed by: INTERNAL MEDICINE

## 2024-05-15 PROCEDURE — 63710000001 INSULIN LISPRO (HUMAN) PER 5 UNITS: Performed by: INTERNAL MEDICINE

## 2024-05-15 PROCEDURE — 80053 COMPREHEN METABOLIC PANEL: CPT | Performed by: INTERNAL MEDICINE

## 2024-05-15 PROCEDURE — 85027 COMPLETE CBC AUTOMATED: CPT | Performed by: INTERNAL MEDICINE

## 2024-05-15 PROCEDURE — G0378 HOSPITAL OBSERVATION PER HR: HCPCS

## 2024-05-15 PROCEDURE — 99232 SBSQ HOSP IP/OBS MODERATE 35: CPT | Performed by: INTERNAL MEDICINE

## 2024-05-15 PROCEDURE — 25010000002 CEFTRIAXONE PER 250 MG: Performed by: INTERNAL MEDICINE

## 2024-05-15 RX ADMIN — Medication 10 ML: at 21:25

## 2024-05-15 RX ADMIN — RIFAXIMIN 550 MG: 550 TABLET ORAL at 08:34

## 2024-05-15 RX ADMIN — ENOXAPARIN SODIUM 40 MG: 100 INJECTION SUBCUTANEOUS at 08:34

## 2024-05-15 RX ADMIN — CEFTRIAXONE SODIUM 2000 MG: 2 INJECTION, POWDER, FOR SOLUTION INTRAMUSCULAR; INTRAVENOUS at 17:21

## 2024-05-15 RX ADMIN — PANTOPRAZOLE SODIUM 40 MG: 40 TABLET, DELAYED RELEASE ORAL at 08:34

## 2024-05-15 RX ADMIN — FUROSEMIDE 40 MG: 40 TABLET ORAL at 17:22

## 2024-05-15 RX ADMIN — INSULIN LISPRO 3 UNITS: 100 INJECTION, SOLUTION INTRAVENOUS; SUBCUTANEOUS at 12:43

## 2024-05-15 RX ADMIN — BUSPIRONE HYDROCHLORIDE 7.5 MG: 7.5 TABLET ORAL at 08:36

## 2024-05-15 RX ADMIN — LACTULOSE 20 G: 20 SOLUTION ORAL at 08:33

## 2024-05-15 RX ADMIN — BUSPIRONE HYDROCHLORIDE 7.5 MG: 7.5 TABLET ORAL at 21:24

## 2024-05-15 RX ADMIN — INSULIN LISPRO 2 UNITS: 100 INJECTION, SOLUTION INTRAVENOUS; SUBCUTANEOUS at 08:33

## 2024-05-15 RX ADMIN — INSULIN LISPRO 2 UNITS: 100 INJECTION, SOLUTION INTRAVENOUS; SUBCUTANEOUS at 17:22

## 2024-05-15 RX ADMIN — CARVEDILOL 6.25 MG: 6.25 TABLET, FILM COATED ORAL at 08:34

## 2024-05-15 RX ADMIN — FUROSEMIDE 40 MG: 40 TABLET ORAL at 08:34

## 2024-05-15 RX ADMIN — INSULIN LISPRO 3 UNITS: 100 INJECTION, SOLUTION INTRAVENOUS; SUBCUTANEOUS at 21:25

## 2024-05-15 RX ADMIN — Medication 10 ML: at 08:35

## 2024-05-15 RX ADMIN — SERTRALINE HYDROCHLORIDE 100 MG: 100 TABLET, FILM COATED ORAL at 21:24

## 2024-05-15 RX ADMIN — Medication 1000 UNITS: at 08:34

## 2024-05-15 RX ADMIN — RIFAXIMIN 550 MG: 550 TABLET ORAL at 21:25

## 2024-05-15 RX ADMIN — CARVEDILOL 6.25 MG: 6.25 TABLET, FILM COATED ORAL at 17:22

## 2024-05-15 NOTE — PLAN OF CARE
Goal Outcome Evaluation:           Progress: no change  Outcome Evaluation: Patient alert and oriented X4 with moments of confusion, easily reoriented. Up with X1 assist to bathroom. No needs or issues noted at this time.

## 2024-05-15 NOTE — PROGRESS NOTES
[facility]   Hospitalist Progress Note  Date: 5/15/2024  Patient Name: Nic Nicolas  : 1965  MRN: [id number]  Date of admission: 2024      Subjective   Subjective     Chief Complaint: Follow up for confusion    Summary: 58 y/o M with cirrhosis of the liver due to ROMO can have a hepatic encephalopathy, DVT in the past, diabetes on insulin, hypertension, obstructive sleep apnea, TBI, and asthma/COPD who presents with confusion. VSS. CBC shows no leukocytosis but does show a hemoglobin of 10.7 which is above his baseline.  CMP shows a slightly elevated bilirubin at 2.4.  Ammonia level is 96.  UA shows moderate blood, positive nitrite, small leuk esterase trace bacteria.  CT of the head shows some frontal lobe atrophy.  Patient was given 2 g of ceftriaxone and started on lactulose q4 hours.     Interval Followup:   Spiked a new fever 100.9 Fahrenheit this morning  Heart rate controlled and blood pressure normal  On room air.  No respiratory complaints  Alert to person and place but not time.  Per staff still having periods of confusion but easily redirectable  Feels his belly is more swollen and tight.  No localized pain.  No diarrhea.  No vomiting.  No joint pain or swelling    Review of Systems  Cardiovascular:  No Chest Pain  Respiratory:  No Cough, No Dyspnea  Gastrointestinal:  +Nausea, No Vomiting  : +dysuria. No suprapubic pain or flank pain      Objective   Objective     Vitals:   Temp:  [97.8 °F (36.6 °C)-100.9 °F (38.3 °C)] 99 °F (37.2 °C)  Heart Rate:  [72-86] 77  Resp:  [18-20] 20  BP: (132-153)/(54-74) 138/69  Physical Exam    Constitutional: Morbidly obese male, resting in bed, restless, conversant   Respiratory: Clear, nonlabored respirations    Cardiovascular:  RRR, bilateral pedal edema   Gastrointestinal: Distended but soft, no localized tenderness to palpation, bowel sounds present throughout   Neurologic: A&Ox2, CN grossly intact, speech clear   Skin: Extremities warm, no  rashes, areas of bruising on the right arm in various stages of healing    Result Review    Result Review:  I have personally reviewed the following over the last 24 hours (07:00 to 07:00) and agree with the following findings  [x]  Laboratory  CBC          5/13/2024    14:23 5/14/2024    05:07 5/15/2024    05:06   CBC   WBC 5.16  3.80  4.27    RBC 3.93  3.46  3.66    Hemoglobin 10.7  9.6  10.1    Hematocrit 33.4  30.0  31.9    MCV 85.0  86.7  87.2    MCH 27.2  27.7  27.6    MCHC 32.0  32.0  31.7    RDW 18.9  19.2  19.3    Platelets 92  67  69      CMP          5/13/2024    14:23 5/14/2024    05:07 5/15/2024    05:06   CMP   Glucose 143  135  142    BUN 19  17  19    Creatinine 0.86  0.85  0.86    EGFR 100.4  100.7  100.4    Sodium 139  141  141    Potassium 4.0  3.6  4.0    Chloride 104  108  105    Calcium 8.9  8.8  9.1    Total Protein 6.3  5.6  6.1    Albumin 3.3  2.9  3.2    Globulin 3.0  2.7  2.9    Total Bilirubin 2.4  1.9  1.4    Alkaline Phosphatase 89  79  81    AST (SGOT) 48  39  37    ALT (SGPT) 22  19  20    Albumin/Globulin Ratio 1.1  1.1  1.1    BUN/Creatinine Ratio 22.1  20.0  22.1    Anion Gap 12.6  11.3  13.6      [x]  Microbiology  Blood culture NGTD  Urine culture >100,000 CFU/mL Normal Urogenital Rachel   []  Radiology  []  EKG/Telemetry   []  Cardiology/Vascular   []  Pathology  []  Old records  []  Other:    Assessment & Plan   Assessment / Plan     Assessment/Plan:  Fever/abdominal distention  Hepatic encephalopathy  ROMO cirrhosis  Urinary tract infection  Chronic thrombocytopenia  Chronic anemia  Essential hypertension  Insulin-dependent type 2 diabetes  Anxiety and depression  Morbid obesity BMI 43       Given microbiology results and new fever; will consult IR to perform paracentesis tomorrow  Hold Lovenox  Check coags  Continue Rocephin 2 g daily. Day 3  Continue lactulose 20 g 2 times daily.  Titrate to 2-3 bowel movements per day.    Continue rifaximin 550 mg every 12 hours  Platelets  69,000. No bleeding events.  Trend and transfuse for platelets less than 10k or less than 20k with bleeding  Hemoglobin 10.1; uptrending.  Monitor and transfuse for hemoglobin less than 7  Continue Protonix 40 mg daily  Creatinine normal.  Continue Lasix 40 mg 2 times daily.  Continue to hold Aldactone.  Trend renal function electrolytes with daily BMP.  Monitor closely for renal toxicity  Blood sugars controlled.  Continue low-dose SSI per protocol.  Monitor for hypoglycemia and hyperglycemia.  Blood pressures controlled.  Continue Coreg 6.25 mg 2 times daily  Continue BuSpar 7.5 mg three times daily.   Continue Zoloft 100 mg nightly  Continue to hold oxycodone IR and avoid narcotics  Continue Tylenol 650 mg every 6 hours as needed.  Not to exceed 3 g/day  Maintain SCDs  Low-sodium/diabetic diet  CBC, CMP in a.m.    Discussed plan with RN  Discussed with SW/LUKAS.  Has bed hold at [place].     DVT prophylaxis:  Medical and mechanical DVT prophylaxis orders are present.        CODE STATUS:   Level Of Support Discussed With: Patient  Code Status (Patient has no pulse and is not breathing): CPR (Attempt to Resuscitate)  Medical Interventions (Patient has pulse or is breathing): Full Support    Electronically signed by Ron Strong DO, 05/15/24, 1:23 PM EDT.

## 2024-05-16 ENCOUNTER — APPOINTMENT (OUTPATIENT)
Dept: INTERVENTIONAL RADIOLOGY/VASCULAR | Facility: HOSPITAL | Age: 59
End: 2024-05-16
Payer: MEDICAID

## 2024-05-16 LAB
ALBUMIN SERPL-MCNC: 2.9 G/DL (ref 3.5–5.2)
ALBUMIN/GLOB SERPL: 1 G/DL
ALP SERPL-CCNC: 76 U/L (ref 39–117)
ALT SERPL W P-5'-P-CCNC: 17 U/L (ref 1–41)
AMMONIA BLD-SCNC: 60 UMOL/L (ref 16–60)
ANION GAP SERPL CALCULATED.3IONS-SCNC: 11.1 MMOL/L (ref 5–15)
APTT PPP: 36.3 SECONDS (ref 78–95.9)
AST SERPL-CCNC: 36 U/L (ref 1–40)
BILIRUB SERPL-MCNC: 1.3 MG/DL (ref 0–1.2)
BUN SERPL-MCNC: 21 MG/DL (ref 6–20)
BUN/CREAT SERPL: 19.3 (ref 7–25)
CALCIUM SPEC-SCNC: 9.1 MG/DL (ref 8.6–10.5)
CHLORIDE SERPL-SCNC: 106 MMOL/L (ref 98–107)
CO2 SERPL-SCNC: 22.9 MMOL/L (ref 22–29)
CREAT SERPL-MCNC: 1.09 MG/DL (ref 0.76–1.27)
DEPRECATED RDW RBC AUTO: 59.3 FL (ref 37–54)
EGFRCR SERPLBLD CKD-EPI 2021: 78.7 ML/MIN/1.73
ERYTHROCYTE [DISTWIDTH] IN BLOOD BY AUTOMATED COUNT: 18.8 % (ref 12.3–15.4)
GLOBULIN UR ELPH-MCNC: 2.9 GM/DL
GLUCOSE BLDC GLUCOMTR-MCNC: 135 MG/DL (ref 70–99)
GLUCOSE BLDC GLUCOMTR-MCNC: 160 MG/DL (ref 70–99)
GLUCOSE BLDC GLUCOMTR-MCNC: 178 MG/DL (ref 70–99)
GLUCOSE BLDC GLUCOMTR-MCNC: 201 MG/DL (ref 70–99)
GLUCOSE SERPL-MCNC: 135 MG/DL (ref 65–99)
HCT VFR BLD AUTO: 30.4 % (ref 37.5–51)
HGB BLD-MCNC: 9.9 G/DL (ref 13–17.7)
MCH RBC QN AUTO: 27.9 PG (ref 26.6–33)
MCHC RBC AUTO-ENTMCNC: 32.6 G/DL (ref 31.5–35.7)
MCV RBC AUTO: 85.6 FL (ref 79–97)
PLATELET # BLD AUTO: 70 10*3/MM3 (ref 140–450)
PMV BLD AUTO: 11.8 FL (ref 6–12)
POTASSIUM SERPL-SCNC: 3.4 MMOL/L (ref 3.5–5.2)
PROT SERPL-MCNC: 5.8 G/DL (ref 6–8.5)
RBC # BLD AUTO: 3.55 10*6/MM3 (ref 4.14–5.8)
SODIUM SERPL-SCNC: 140 MMOL/L (ref 136–145)
WBC NRBC COR # BLD AUTO: 3.88 10*3/MM3 (ref 3.4–10.8)

## 2024-05-16 PROCEDURE — 63710000001 INSULIN LISPRO (HUMAN) PER 5 UNITS: Performed by: INTERNAL MEDICINE

## 2024-05-16 PROCEDURE — 80053 COMPREHEN METABOLIC PANEL: CPT | Performed by: INTERNAL MEDICINE

## 2024-05-16 PROCEDURE — 76705 ECHO EXAM OF ABDOMEN: CPT

## 2024-05-16 PROCEDURE — 82140 ASSAY OF AMMONIA: CPT | Performed by: INTERNAL MEDICINE

## 2024-05-16 PROCEDURE — 25010000002 CEFTRIAXONE PER 250 MG: Performed by: INTERNAL MEDICINE

## 2024-05-16 PROCEDURE — 85027 COMPLETE CBC AUTOMATED: CPT | Performed by: INTERNAL MEDICINE

## 2024-05-16 PROCEDURE — G0378 HOSPITAL OBSERVATION PER HR: HCPCS

## 2024-05-16 PROCEDURE — 82948 REAGENT STRIP/BLOOD GLUCOSE: CPT

## 2024-05-16 PROCEDURE — 99233 SBSQ HOSP IP/OBS HIGH 50: CPT | Performed by: INTERNAL MEDICINE

## 2024-05-16 PROCEDURE — 85730 THROMBOPLASTIN TIME PARTIAL: CPT | Performed by: INTERNAL MEDICINE

## 2024-05-16 PROCEDURE — 82948 REAGENT STRIP/BLOOD GLUCOSE: CPT | Performed by: INTERNAL MEDICINE

## 2024-05-16 RX ORDER — UREA 10 %
5 LOTION (ML) TOPICAL NIGHTLY
Status: DISCONTINUED | OUTPATIENT
Start: 2024-05-16 | End: 2024-05-19

## 2024-05-16 RX ORDER — LIDOCAINE HYDROCHLORIDE 20 MG/ML
10 INJECTION, SOLUTION INFILTRATION; PERINEURAL ONCE
Status: DISCONTINUED | OUTPATIENT
Start: 2024-05-16 | End: 2024-05-16

## 2024-05-16 RX ADMIN — BUSPIRONE HYDROCHLORIDE 7.5 MG: 7.5 TABLET ORAL at 17:10

## 2024-05-16 RX ADMIN — CARVEDILOL 6.25 MG: 6.25 TABLET, FILM COATED ORAL at 17:10

## 2024-05-16 RX ADMIN — FUROSEMIDE 40 MG: 40 TABLET ORAL at 09:11

## 2024-05-16 RX ADMIN — LACTULOSE 20 G: 20 SOLUTION ORAL at 09:11

## 2024-05-16 RX ADMIN — INSULIN LISPRO 3 UNITS: 100 INJECTION, SOLUTION INTRAVENOUS; SUBCUTANEOUS at 21:26

## 2024-05-16 RX ADMIN — SERTRALINE HYDROCHLORIDE 100 MG: 100 TABLET, FILM COATED ORAL at 21:27

## 2024-05-16 RX ADMIN — ACETAMINOPHEN 650 MG: 325 TABLET ORAL at 15:51

## 2024-05-16 RX ADMIN — BUSPIRONE HYDROCHLORIDE 7.5 MG: 7.5 TABLET ORAL at 09:41

## 2024-05-16 RX ADMIN — PANTOPRAZOLE SODIUM 40 MG: 40 TABLET, DELAYED RELEASE ORAL at 09:11

## 2024-05-16 RX ADMIN — Medication 5 MG: at 21:27

## 2024-05-16 RX ADMIN — Medication 10 ML: at 09:42

## 2024-05-16 RX ADMIN — RIFAXIMIN 550 MG: 550 TABLET ORAL at 09:11

## 2024-05-16 RX ADMIN — RIFAXIMIN 550 MG: 550 TABLET ORAL at 21:27

## 2024-05-16 RX ADMIN — BUSPIRONE HYDROCHLORIDE 7.5 MG: 7.5 TABLET ORAL at 21:27

## 2024-05-16 RX ADMIN — INSULIN LISPRO 2 UNITS: 100 INJECTION, SOLUTION INTRAVENOUS; SUBCUTANEOUS at 12:05

## 2024-05-16 RX ADMIN — LACTULOSE 20 G: 20 SOLUTION ORAL at 21:26

## 2024-05-16 RX ADMIN — FUROSEMIDE 40 MG: 40 TABLET ORAL at 17:10

## 2024-05-16 RX ADMIN — CEFTRIAXONE SODIUM 2000 MG: 2 INJECTION, POWDER, FOR SOLUTION INTRAMUSCULAR; INTRAVENOUS at 17:09

## 2024-05-16 RX ADMIN — INSULIN LISPRO 2 UNITS: 100 INJECTION, SOLUTION INTRAVENOUS; SUBCUTANEOUS at 17:34

## 2024-05-16 RX ADMIN — CARVEDILOL 6.25 MG: 6.25 TABLET, FILM COATED ORAL at 09:11

## 2024-05-16 RX ADMIN — Medication 10 ML: at 21:27

## 2024-05-16 RX ADMIN — Medication 1000 UNITS: at 09:11

## 2024-05-16 NOTE — PROGRESS NOTES
[facility]   Hospitalist Progress Note  Date: 2024  Patient Name: Nic Nicolas  : 1965  MRN: [id number]  Date of admission: 2024      Subjective   Subjective     Chief Complaint: Follow up for confusion    Summary: 58 y/o M with cirrhosis of the liver due to ROMO can have a hepatic encephalopathy, DVT in the past, diabetes on insulin, hypertension, obstructive sleep apnea, TBI, and asthma/COPD who presents with confusion. VSS. CBC shows no leukocytosis but does show a hemoglobin of 10.7 which is above his baseline.  CMP shows a slightly elevated bilirubin at 2.4.  Ammonia level is 96.  UA shows moderate blood, positive nitrite, small leuk esterase trace bacteria.  CT of the head shows some frontal lobe atrophy.  Patient was given 2 g of ceftriaxone and started on lactulose q4 hours.     Interval Followup:   No fevers overnight.  Blood pressure controlled  Remains alert but confused  Requesting stronger pain medication, states he hurts all over but denying abdominal pain  Noted plans for paracentesis    Review of Systems  Respiratory:  No Cough, No Dyspnea  Gastrointestinal:  +Nausea, No Vomiting  : oO dysuria, No suprapubic pain or flank pain      Objective   Objective     Vitals:   Temp:  [97.9 °F (36.6 °C)-98.6 °F (37 °C)] 98.3 °F (36.8 °C)  Heart Rate:  [68-80] 80  Resp:  [20-22] 22  BP: (136-155)/(60-77) 150/70  Physical Exam    Constitutional: Morbidly obese male, resting in bed, restless, conversant   Respiratory: Clear, nonlabored respirations    Cardiovascular:  RRR, bilateral pedal edema   Gastrointestinal: Distended but soft, no localized tenderness to palpation, bowel sounds present throughout   Neurologic: A&Ox1-2, CN grossly intact, speech clear   Skin: Extremities warm, no rashes, areas of bruising on the right arm in various stages of healing    Result Review    Result Review:  I have personally reviewed the following over the last 24 hours (07:00 to 07:00) and agree with  the following findings  [x]  Laboratory  CBC          5/14/2024    05:07 5/15/2024    05:06 5/16/2024    05:03   CBC   WBC 3.80  4.27  3.88    RBC 3.46  3.66  3.55    Hemoglobin 9.6  10.1  9.9    Hematocrit 30.0  31.9  30.4    MCV 86.7  87.2  85.6    MCH 27.7  27.6  27.9    MCHC 32.0  31.7  32.6    RDW 19.2  19.3  18.8    Platelets 67  69  70      CMP          5/14/2024    05:07 5/15/2024    05:06 5/16/2024    05:03   CMP   Glucose 135  142  135    BUN 17  19  21    Creatinine 0.85  0.86  1.09    EGFR 100.7  100.4  78.7    Sodium 141  141  140    Potassium 3.6  4.0  3.4    Chloride 108  105  106    Calcium 8.8  9.1  9.1    Total Protein 5.6  6.1  5.8    Albumin 2.9  3.2  2.9    Globulin 2.7  2.9  2.9    Total Bilirubin 1.9  1.4  1.3    Alkaline Phosphatase 79  81  76    AST (SGOT) 39  37  36    ALT (SGPT) 19  20  17    Albumin/Globulin Ratio 1.1  1.1  1.0    BUN/Creatinine Ratio 20.0  22.1  19.3    Anion Gap 11.3  13.6  11.1      [x]  Microbiology  Blood culture NGTD  Urine culture >100,000 CFU/mL Normal Urogenital Rachel   []  Radiology  []  EKG/Telemetry   []  Cardiology/Vascular   []  Pathology  []  Old records  []  Other:    Assessment & Plan   Assessment / Plan     Assessment/Plan:  Fever/abdominal distention; rule out SBP  Hepatic encephalopathy  ROMO cirrhosis  Urinary tract infection  Chronic thrombocytopenia  Chronic anemia  Essential hypertension  Insulin-dependent type 2 diabetes  Anxiety and depression  Morbid obesity BMI 43       Given his presentation and fever yesterday, will send for ultrasound and possible paracentesis to assess for SBP  Lovenox on hold pending procedure  Continue Rocephin 2 g daily. Day 4  Continue lactulose 20 g 2 times daily.  Titrate to 2-3 bowel movements per day.    Continue rifaximin 550 mg every 12 hours  Platelets 79,000. No bleeding events.  Trend and transfuse for platelets less than 10k or less than 20k with bleeding  Hemoglobin 9.9; stable monitor and transfuse for  hemoglobin less than 7  Continue Protonix 40 mg daily  Creatinine normal. Continue Lasix 40 mg 2 times daily.  Continue to hold Aldactone.  Trend renal function electrolytes with daily BMP.  Monitor closely for renal toxicity  Blood sugars controlled.  Continue low-dose SSI per protocol.  Monitor for hypoglycemia and hyperglycemia.  Blood pressures controlled.  Continue Coreg 6.25 mg 2 times daily  Continue BuSpar 7.5 mg three times daily.   Continue Zoloft 100 mg nightly  Continue to hold oxycodone IR and avoid narcotics  Continue Tylenol 650 mg every 6 hours as needed.  Not to exceed 3 g/day.   Maintain SCDs  Low-sodium/diabetic diet  CBC, CMP in a.m.    Discussed plan with RN  Discussed with SW/CM.  Has bed hold at [place].     DVT prophylaxis:  Medical and mechanical DVT prophylaxis orders are present.    I spent greater than 50 minutes minutes of time for evaluation and management of this patient including review of chart, labs pertinent imaging available as well as medical decision making and discussion with physicians, staff and patient.       CODE STATUS:   Level Of Support Discussed With: Patient  Code Status (Patient has no pulse and is not breathing): CPR (Attempt to Resuscitate)  Medical Interventions (Patient has pulse or is breathing): Full Support    Electronically signed by Ron Strong DO, 05/16/24, 3:08 PM EDT.

## 2024-05-16 NOTE — PLAN OF CARE
Goal Outcome Evaluation: patient went down for thoracentesis which was not completed as there was no fluid to be drained. Patient agitated for a short time during shift. Given tyl and scheduled medications and calmed down quickly..

## 2024-05-16 NOTE — PLAN OF CARE
Goal Outcome Evaluation:           Progress: no change  Outcome Evaluation: Patient agitated at beginning of shift, refused lactulose. Up with X1 assist to bathroom. No needs or issues noted at this time.

## 2024-05-17 LAB
ALBUMIN SERPL-MCNC: 3 G/DL (ref 3.5–5.2)
ALBUMIN/GLOB SERPL: 1 G/DL
ALP SERPL-CCNC: 76 U/L (ref 39–117)
ALT SERPL W P-5'-P-CCNC: 18 U/L (ref 1–41)
ANION GAP SERPL CALCULATED.3IONS-SCNC: 11.6 MMOL/L (ref 5–15)
AST SERPL-CCNC: 36 U/L (ref 1–40)
BILIRUB SERPL-MCNC: 1.4 MG/DL (ref 0–1.2)
BUN SERPL-MCNC: 24 MG/DL (ref 6–20)
BUN/CREAT SERPL: 18.2 (ref 7–25)
CALCIUM SPEC-SCNC: 8.8 MG/DL (ref 8.6–10.5)
CHLORIDE SERPL-SCNC: 102 MMOL/L (ref 98–107)
CO2 SERPL-SCNC: 21.4 MMOL/L (ref 22–29)
CREAT SERPL-MCNC: 1.32 MG/DL (ref 0.76–1.27)
DEPRECATED RDW RBC AUTO: 58.8 FL (ref 37–54)
EGFRCR SERPLBLD CKD-EPI 2021: 62.5 ML/MIN/1.73
ERYTHROCYTE [DISTWIDTH] IN BLOOD BY AUTOMATED COUNT: 18.6 % (ref 12.3–15.4)
GLOBULIN UR ELPH-MCNC: 2.9 GM/DL
GLUCOSE BLDC GLUCOMTR-MCNC: 122 MG/DL (ref 70–99)
GLUCOSE BLDC GLUCOMTR-MCNC: 164 MG/DL (ref 70–99)
GLUCOSE BLDC GLUCOMTR-MCNC: 174 MG/DL (ref 70–99)
GLUCOSE BLDC GLUCOMTR-MCNC: 183 MG/DL (ref 70–99)
GLUCOSE SERPL-MCNC: 170 MG/DL (ref 65–99)
HCT VFR BLD AUTO: 30.5 % (ref 37.5–51)
HGB BLD-MCNC: 9.9 G/DL (ref 13–17.7)
MCH RBC QN AUTO: 28 PG (ref 26.6–33)
MCHC RBC AUTO-ENTMCNC: 32.5 G/DL (ref 31.5–35.7)
MCV RBC AUTO: 86.2 FL (ref 79–97)
PLATELET # BLD AUTO: 65 10*3/MM3 (ref 140–450)
PMV BLD AUTO: 11.8 FL (ref 6–12)
POTASSIUM SERPL-SCNC: 3.7 MMOL/L (ref 3.5–5.2)
PROT SERPL-MCNC: 5.9 G/DL (ref 6–8.5)
RBC # BLD AUTO: 3.54 10*6/MM3 (ref 4.14–5.8)
SODIUM SERPL-SCNC: 135 MMOL/L (ref 136–145)
WBC NRBC COR # BLD AUTO: 4.9 10*3/MM3 (ref 3.4–10.8)

## 2024-05-17 PROCEDURE — 82948 REAGENT STRIP/BLOOD GLUCOSE: CPT

## 2024-05-17 PROCEDURE — 25010000002 ENOXAPARIN PER 10 MG: Performed by: INTERNAL MEDICINE

## 2024-05-17 PROCEDURE — 25010000002 CEFTRIAXONE PER 250 MG: Performed by: INTERNAL MEDICINE

## 2024-05-17 PROCEDURE — 82948 REAGENT STRIP/BLOOD GLUCOSE: CPT | Performed by: INTERNAL MEDICINE

## 2024-05-17 PROCEDURE — 99232 SBSQ HOSP IP/OBS MODERATE 35: CPT | Performed by: INTERNAL MEDICINE

## 2024-05-17 PROCEDURE — 85027 COMPLETE CBC AUTOMATED: CPT | Performed by: INTERNAL MEDICINE

## 2024-05-17 PROCEDURE — 80053 COMPREHEN METABOLIC PANEL: CPT | Performed by: INTERNAL MEDICINE

## 2024-05-17 PROCEDURE — 63710000001 INSULIN LISPRO (HUMAN) PER 5 UNITS: Performed by: INTERNAL MEDICINE

## 2024-05-17 RX ORDER — FUROSEMIDE 40 MG/1
40 TABLET ORAL DAILY
Status: DISCONTINUED | OUTPATIENT
Start: 2024-05-17 | End: 2024-05-19

## 2024-05-17 RX ORDER — SUCRALFATE 1 G/1
1 TABLET ORAL
Status: DISCONTINUED | OUTPATIENT
Start: 2024-05-17 | End: 2024-05-19

## 2024-05-17 RX ADMIN — Medication 1000 UNITS: at 09:16

## 2024-05-17 RX ADMIN — INSULIN LISPRO 2 UNITS: 100 INJECTION, SOLUTION INTRAVENOUS; SUBCUTANEOUS at 17:58

## 2024-05-17 RX ADMIN — ENOXAPARIN SODIUM 40 MG: 100 INJECTION SUBCUTANEOUS at 12:19

## 2024-05-17 RX ADMIN — Medication 10 ML: at 21:07

## 2024-05-17 RX ADMIN — CEFTRIAXONE SODIUM 2000 MG: 2 INJECTION, POWDER, FOR SOLUTION INTRAMUSCULAR; INTRAVENOUS at 17:58

## 2024-05-17 RX ADMIN — ACETAMINOPHEN 650 MG: 325 TABLET ORAL at 17:58

## 2024-05-17 RX ADMIN — SERTRALINE HYDROCHLORIDE 100 MG: 100 TABLET, FILM COATED ORAL at 21:07

## 2024-05-17 RX ADMIN — INSULIN LISPRO 2 UNITS: 100 INJECTION, SOLUTION INTRAVENOUS; SUBCUTANEOUS at 21:08

## 2024-05-17 RX ADMIN — RIFAXIMIN 550 MG: 550 TABLET ORAL at 21:07

## 2024-05-17 RX ADMIN — ACETAMINOPHEN 650 MG: 325 TABLET ORAL at 01:09

## 2024-05-17 RX ADMIN — Medication 5 MG: at 21:07

## 2024-05-17 RX ADMIN — Medication 10 ML: at 09:17

## 2024-05-17 RX ADMIN — RIFAXIMIN 550 MG: 550 TABLET ORAL at 09:17

## 2024-05-17 RX ADMIN — PANTOPRAZOLE SODIUM 40 MG: 40 TABLET, DELAYED RELEASE ORAL at 09:17

## 2024-05-17 RX ADMIN — SUCRALFATE 1 G: 1 TABLET ORAL at 23:28

## 2024-05-17 RX ADMIN — ACETAMINOPHEN 650 MG: 325 TABLET ORAL at 09:17

## 2024-05-17 RX ADMIN — CARVEDILOL 6.25 MG: 6.25 TABLET, FILM COATED ORAL at 17:58

## 2024-05-17 RX ADMIN — CARVEDILOL 6.25 MG: 6.25 TABLET, FILM COATED ORAL at 09:17

## 2024-05-17 RX ADMIN — INSULIN LISPRO 2 UNITS: 100 INJECTION, SOLUTION INTRAVENOUS; SUBCUTANEOUS at 12:19

## 2024-05-17 RX ADMIN — BUSPIRONE HYDROCHLORIDE 7.5 MG: 7.5 TABLET ORAL at 21:07

## 2024-05-17 RX ADMIN — FUROSEMIDE 40 MG: 40 TABLET ORAL at 09:16

## 2024-05-17 RX ADMIN — BUSPIRONE HYDROCHLORIDE 7.5 MG: 7.5 TABLET ORAL at 09:16

## 2024-05-17 RX ADMIN — ENOXAPARIN SODIUM 40 MG: 100 INJECTION SUBCUTANEOUS at 23:28

## 2024-05-17 RX ADMIN — BUSPIRONE HYDROCHLORIDE 7.5 MG: 7.5 TABLET ORAL at 15:59

## 2024-05-17 RX ADMIN — SUCRALFATE 1 G: 1 TABLET ORAL at 18:06

## 2024-05-17 NOTE — PROGRESS NOTES
[facility]   Hospitalist Progress Note  Date: 2024  Patient Name: Nic Nicolas  : 1965  MRN: [id number]  Date of admission: 2024      Subjective   Subjective     Chief Complaint: Follow up for confusion    Summary: 60 y/o M with cirrhosis of the liver due to ROMO can have a hepatic encephalopathy, DVT in the past, diabetes on insulin, hypertension, obstructive sleep apnea, TBI, and asthma/COPD who presents with confusion. VSS. CBC shows no leukocytosis but does show a hemoglobin of 10.7 which is above his baseline.  CMP shows a slightly elevated bilirubin at 2.4.  Ammonia level is 96.  UA shows moderate blood, positive nitrite, small leuk esterase trace bacteria.  CT of the head shows global/frontal lobe atrophy.  Patient was given 2 g of ceftriaxone and started on lactulose q4 hours.  Gradual improvement in mentation.  Ammonia normalized.  No ascites on ultrasound.    Interval Followup:   Afebrile.  Blood pressure controlled  Alert, answering questions and following command  Still with some mild confusion but overall better  Tolerating oral intake  No behavioral issues reported per RN      Review of Systems  Respiratory: No Dyspnea  Gastrointestinal:  No Nausea, No Vomiting, No constipation    Objective   Objective     Vitals:   Temp:  [97.9 °F (36.6 °C)-99.1 °F (37.3 °C)] 97.9 °F (36.6 °C)  Heart Rate:  [69-81] 81  Resp:  [20-24] 20  BP: (128-156)/(56-71) 128/57  Physical Exam    Constitutional: Morbidly obese male, resting in bed, NAD   Respiratory: Clear, nonlabored respirations    Cardiovascular:  RRR, bilateral pedal edema   Gastrointestinal: Distended but soft, no localized tenderness to palpation, bowel sounds present throughout   Neurologic: A&Ox2, CN grossly intact, speech clear   Skin: Extremities warm    Result Review    Result Review:  I have personally reviewed the following over the last 24 hours (07:00 to 07:00) and agree with the following findings  [x]  Laboratory  CBC           5/15/2024    05:06 5/16/2024    05:03 5/17/2024    04:51   CBC   WBC 4.27  3.88  4.90    RBC 3.66  3.55  3.54    Hemoglobin 10.1  9.9  9.9    Hematocrit 31.9  30.4  30.5    MCV 87.2  85.6  86.2    MCH 27.6  27.9  28.0    MCHC 31.7  32.6  32.5    RDW 19.3  18.8  18.6    Platelets 69  70  65      CMP          5/15/2024    05:06 5/16/2024    05:03 5/17/2024    04:51   CMP   Glucose 142  135  170    BUN 19  21  24    Creatinine 0.86  1.09  1.32    EGFR 100.4  78.7  62.5    Sodium 141  140  135    Potassium 4.0  3.4  3.7    Chloride 105  106  102    Calcium 9.1  9.1  8.8    Total Protein 6.1  5.8  5.9    Albumin 3.2  2.9  3.0    Globulin 2.9  2.9  2.9    Total Bilirubin 1.4  1.3  1.4    Alkaline Phosphatase 81  76  76    AST (SGOT) 37  36  36    ALT (SGPT) 20  17  18    Albumin/Globulin Ratio 1.1  1.0  1.0    BUN/Creatinine Ratio 22.1  19.3  18.2    Anion Gap 13.6  11.1  11.6      [x]  Microbiology  Blood culture NGTD  Urine culture >100,000 CFU/mL Normal Urogenital Rachel   []  Radiology  []  EKG/Telemetry   []  Cardiology/Vascular   []  Pathology  []  Old records  []  Other:    Assessment & Plan   Assessment / Plan     Assessment/Plan:  Fever/abdominal distention; rule out SBP  Hepatic encephalopathy  ROMO cirrhosis  Chronic thrombocytopenia  Chronic anemia  History of GI bleed secondary to peptic ulcer  History of GERD without esophagitis  Essential hypertension  Insulin-dependent type 2 diabetes  Anxiety and depression  Morbid obesity BMI 43       Not enough ascites seen on ultrasound for paracentesis  No further fevers, white count remains normal and microbiology was negative, will continue Rocephin to complete 5-day  Continue lactulose 20 g 2 times daily.  Titrate to 2-3 bowel movements per day.    Continue rifaximin 550 mg every 12 hours  Platelets 65,000. No bleeding events.  Trend and transfuse for platelets less than 10k or less than 20k with bleeding  Hemoglobin 9.9; stable monitor and transfuse for  hemoglobin less than 7  Continue Protonix 40 mg daily.  Start Carafate  Creatinine up to 1.32.  Will adjust Lasix to 40 mg daily. Continue to hold Aldactone.  Trend renal function electrolytes with daily BMP.  Monitor closely for renal toxicity  Blood sugars controlled.  Continue low-dose SSI per protocol.  Monitor for hypoglycemia and hyperglycemia.  Blood pressures controlled.  Continue Coreg 6.25 mg 2 times daily  Continue BuSpar 7.5 mg three times daily.   Continue Zoloft 100 mg nightly  Continue to hold oxycodone IR and avoid narcotics  Continue Tylenol 650 mg every 6 hours as needed.  Not to exceed 2 g/day  Maintain SCDs  Low-sodium/diabetic diet  VTE ppx: Restart Lovenox  CBC, CMP in a.m.    Discussed plan with RN  Discussed with SW/CM.  Has bed hold at [place].     DVT prophylaxis:  Medical and mechanical DVT prophylaxis orders are present.        CODE STATUS:   Level Of Support Discussed With: Patient  Code Status (Patient has no pulse and is not breathing): CPR (Attempt to Resuscitate)  Medical Interventions (Patient has pulse or is breathing): Full Support    Electronically signed by Ron Strong DO, 05/17/24, 2:50 PM EDT.

## 2024-05-17 NOTE — PLAN OF CARE
Goal Outcome Evaluation:  Plan of Care Reviewed With: patient           Outcome Evaluation: Patient A/Ox3. Forgetful. Refused lactulose. Standby assist to bathroom. On room air. PRN Tylenol administered as ordered for pain. No other issues/needs at this time.

## 2024-05-18 ENCOUNTER — APPOINTMENT (OUTPATIENT)
Dept: MRI IMAGING | Facility: HOSPITAL | Age: 59
End: 2024-05-18
Payer: MEDICAID

## 2024-05-18 ENCOUNTER — APPOINTMENT (OUTPATIENT)
Dept: GENERAL RADIOLOGY | Facility: HOSPITAL | Age: 59
End: 2024-05-18
Payer: MEDICAID

## 2024-05-18 LAB
AMMONIA BLD-SCNC: 171 UMOL/L (ref 16–60)
ANION GAP SERPL CALCULATED.3IONS-SCNC: 12.8 MMOL/L (ref 5–15)
ANION GAP SERPL CALCULATED.3IONS-SCNC: 14.2 MMOL/L (ref 5–15)
ARTERIAL PATENCY WRIST A: POSITIVE
ARTERIAL PATENCY WRIST A: POSITIVE
BACTERIA SPEC AEROBE CULT: NORMAL
BACTERIA SPEC AEROBE CULT: NORMAL
BASE EXCESS BLDA CALC-SCNC: -11.3 MMOL/L (ref -2–2)
BASE EXCESS BLDA CALC-SCNC: -6.4 MMOL/L (ref -2–2)
BDY SITE: ABNORMAL
BDY SITE: ABNORMAL
BILIRUB UR QL STRIP: NEGATIVE
BUN SERPL-MCNC: 25 MG/DL (ref 6–20)
BUN SERPL-MCNC: 27 MG/DL (ref 6–20)
BUN/CREAT SERPL: 20.3 (ref 7–25)
BUN/CREAT SERPL: 24.3 (ref 7–25)
CA-I BLDA-SCNC: 1.12 MMOL/L (ref 1.13–1.32)
CA-I BLDA-SCNC: 1.15 MMOL/L (ref 1.13–1.32)
CALCIUM SPEC-SCNC: 8.8 MG/DL (ref 8.6–10.5)
CALCIUM SPEC-SCNC: 9 MG/DL (ref 8.6–10.5)
CHLORIDE BLDA-SCNC: 102 MMOL/L (ref 98–106)
CHLORIDE BLDA-SCNC: 99 MMOL/L (ref 98–106)
CHLORIDE SERPL-SCNC: 100 MMOL/L (ref 98–107)
CHLORIDE SERPL-SCNC: 103 MMOL/L (ref 98–107)
CLARITY UR: CLEAR
CO2 SERPL-SCNC: 21.8 MMOL/L (ref 22–29)
CO2 SERPL-SCNC: 22.2 MMOL/L (ref 22–29)
COHGB MFR BLD: 0.2 % (ref 0–1.5)
COHGB MFR BLD: 0.5 % (ref 0–1.5)
COLOR UR: YELLOW
CREAT SERPL-MCNC: 1.03 MG/DL (ref 0.76–1.27)
CREAT SERPL-MCNC: 1.33 MG/DL (ref 0.76–1.27)
CRP SERPL-MCNC: <0.3 MG/DL (ref 0–0.5)
D DIMER PPP FEU-MCNC: 1.22 MCGFEU/ML (ref 0–0.58)
D-LACTATE SERPL-SCNC: 3.2 MMOL/L (ref 0.5–2)
EGFRCR SERPLBLD CKD-EPI 2021: 62 ML/MIN/1.73
EGFRCR SERPLBLD CKD-EPI 2021: 84.2 ML/MIN/1.73
FHHB: 1.1 % (ref 0–5)
FHHB: 2.4 % (ref 0–5)
GAS FLOW AIRWAY: 15 LPM
GAS FLOW AIRWAY: ABNORMAL L/MIN
GLUCOSE BLDA-MCNC: 156 MG/DL (ref 70–99)
GLUCOSE BLDA-MCNC: 166 MG/DL (ref 70–99)
GLUCOSE BLDC GLUCOMTR-MCNC: 127 MG/DL (ref 70–99)
GLUCOSE BLDC GLUCOMTR-MCNC: 143 MG/DL (ref 70–99)
GLUCOSE BLDC GLUCOMTR-MCNC: 151 MG/DL (ref 70–99)
GLUCOSE BLDC GLUCOMTR-MCNC: 162 MG/DL (ref 70–99)
GLUCOSE SERPL-MCNC: 118 MG/DL (ref 65–99)
GLUCOSE SERPL-MCNC: 175 MG/DL (ref 65–99)
GLUCOSE UR STRIP-MCNC: NEGATIVE MG/DL
HCO3 BLDA-SCNC: 15.1 MMOL/L (ref 22–26)
HCO3 BLDA-SCNC: 20.4 MMOL/L (ref 22–26)
HGB BLDA-MCNC: 12.1 G/DL (ref 13.8–16.4)
HGB BLDA-MCNC: 13.4 G/DL (ref 13.8–16.4)
HGB UR QL STRIP.AUTO: NEGATIVE
INHALED O2 CONCENTRATION: 100 %
INHALED O2 CONCENTRATION: 40 %
KETONES UR QL STRIP: NEGATIVE
L PNEUMO1 AG UR QL IA: NEGATIVE
LACTATE BLDA-SCNC: 3.62 MMOL/L (ref 0.5–2)
LACTATE BLDA-SCNC: 9.41 MMOL/L (ref 0.5–2)
LEUKOCYTE ESTERASE UR QL STRIP.AUTO: NEGATIVE
METHGB BLD QL: 0.4 % (ref 0–1.5)
METHGB BLD QL: 0.4 % (ref 0–1.5)
MODALITY: ABNORMAL
MODALITY: ABNORMAL
MRSA DNA SPEC QL NAA+PROBE: NORMAL
NITRITE UR QL STRIP: NEGATIVE
NOTE: ABNORMAL
NOTE: ABNORMAL
OXYHGB MFR BLDV: 97 % (ref 94–99)
OXYHGB MFR BLDV: 98 % (ref 94–99)
PCO2 BLDA: 35.9 MM HG (ref 35–45)
PCO2 BLDA: 45.7 MM HG (ref 35–45)
PH BLDA: 7.24 PH UNITS (ref 7.35–7.45)
PH BLDA: 7.27 PH UNITS (ref 7.35–7.45)
PH UR STRIP.AUTO: 5.5 [PH] (ref 5–8)
PHOSPHATE SERPL-MCNC: 4.3 MG/DL (ref 2.5–4.5)
PO2 BLD: 205 MM[HG] (ref 0–500)
PO2 BLD: 325 MM[HG] (ref 0–500)
PO2 BLDA: 130 MM HG (ref 80–100)
PO2 BLDA: 204.7 MM HG (ref 80–100)
POTASSIUM BLDA-SCNC: 3.8 MMOL/L (ref 3.5–5)
POTASSIUM BLDA-SCNC: 4.09 MMOL/L (ref 3.5–5)
POTASSIUM SERPL-SCNC: 3.8 MMOL/L (ref 3.5–5.2)
POTASSIUM SERPL-SCNC: 3.8 MMOL/L (ref 3.5–5.2)
PROT UR QL STRIP: NEGATIVE
S PNEUM AG SPEC QL LA: NEGATIVE
SAO2 % BLDCOA: 97.6 % (ref 95–99)
SAO2 % BLDCOA: 98.9 % (ref 95–99)
SODIUM BLDA-SCNC: 133.9 MMOL/L (ref 136–146)
SODIUM BLDA-SCNC: 136.2 MMOL/L (ref 136–146)
SODIUM SERPL-SCNC: 136 MMOL/L (ref 136–145)
SODIUM SERPL-SCNC: 138 MMOL/L (ref 136–145)
SP GR UR STRIP: 1.01 (ref 1–1.03)
UROBILINOGEN UR QL STRIP: NORMAL

## 2024-05-18 PROCEDURE — 87147 CULTURE TYPE IMMUNOLOGIC: CPT | Performed by: INTERNAL MEDICINE

## 2024-05-18 PROCEDURE — 88312 SPECIAL STAINS GROUP 1: CPT | Performed by: INTERNAL MEDICINE

## 2024-05-18 PROCEDURE — 80048 BASIC METABOLIC PNL TOTAL CA: CPT | Performed by: INTERNAL MEDICINE

## 2024-05-18 PROCEDURE — 82948 REAGENT STRIP/BLOOD GLUCOSE: CPT | Performed by: INTERNAL MEDICINE

## 2024-05-18 PROCEDURE — 86140 C-REACTIVE PROTEIN: CPT | Performed by: PSYCHIATRY & NEUROLOGY

## 2024-05-18 PROCEDURE — 93005 ELECTROCARDIOGRAM TRACING: CPT | Performed by: INTERNAL MEDICINE

## 2024-05-18 PROCEDURE — 82375 ASSAY CARBOXYHB QUANT: CPT | Performed by: INTERNAL MEDICINE

## 2024-05-18 PROCEDURE — 71045 X-RAY EXAM CHEST 1 VIEW: CPT

## 2024-05-18 PROCEDURE — 94002 VENT MGMT INPAT INIT DAY: CPT

## 2024-05-18 PROCEDURE — 36600 WITHDRAWAL OF ARTERIAL BLOOD: CPT | Performed by: INTERNAL MEDICINE

## 2024-05-18 PROCEDURE — 25010000002 MIDAZOLAM PER 1MG: Performed by: PHYSICIAN ASSISTANT

## 2024-05-18 PROCEDURE — 82375 ASSAY CARBOXYHB QUANT: CPT | Performed by: PHYSICIAN ASSISTANT

## 2024-05-18 PROCEDURE — 83825 ASSAY OF MERCURY: CPT | Performed by: PSYCHIATRY & NEUROLOGY

## 2024-05-18 PROCEDURE — 76937 US GUIDE VASCULAR ACCESS: CPT

## 2024-05-18 PROCEDURE — 86769 SARS-COV-2 COVID-19 ANTIBODY: CPT | Performed by: PSYCHIATRY & NEUROLOGY

## 2024-05-18 PROCEDURE — 94799 UNLISTED PULMONARY SVC/PX: CPT

## 2024-05-18 PROCEDURE — 0BH18EZ INSERTION OF ENDOTRACHEAL AIRWAY INTO TRACHEA, VIA NATURAL OR ARTIFICIAL OPENING ENDOSCOPIC: ICD-10-PCS | Performed by: NURSE PRACTITIONER

## 2024-05-18 PROCEDURE — 99232 SBSQ HOSP IP/OBS MODERATE 35: CPT | Performed by: INTERNAL MEDICINE

## 2024-05-18 PROCEDURE — 25010000002 PROPOFOL 10 MG/ML EMULSION: Performed by: INTERNAL MEDICINE

## 2024-05-18 PROCEDURE — 82805 BLOOD GASES W/O2 SATURATION: CPT | Performed by: INTERNAL MEDICINE

## 2024-05-18 PROCEDURE — 31500 INSERT EMERGENCY AIRWAY: CPT | Performed by: NURSE PRACTITIONER

## 2024-05-18 PROCEDURE — 93010 ELECTROCARDIOGRAM REPORT: CPT | Performed by: SPECIALIST

## 2024-05-18 PROCEDURE — 87205 SMEAR GRAM STAIN: CPT | Performed by: INTERNAL MEDICINE

## 2024-05-18 PROCEDURE — 87449 NOS EACH ORGANISM AG IA: CPT | Performed by: INTERNAL MEDICINE

## 2024-05-18 PROCEDURE — 87070 CULTURE OTHR SPECIMN AEROBIC: CPT | Performed by: INTERNAL MEDICINE

## 2024-05-18 PROCEDURE — 63710000001 INSULIN LISPRO (HUMAN) PER 5 UNITS: Performed by: INTERNAL MEDICINE

## 2024-05-18 PROCEDURE — 83050 HGB METHEMOGLOBIN QUAN: CPT | Performed by: INTERNAL MEDICINE

## 2024-05-18 PROCEDURE — 25010000002 MIDAZOLAM PER 1MG

## 2024-05-18 PROCEDURE — 87154 CUL TYP ID BLD PTHGN 6+ TRGT: CPT | Performed by: INTERNAL MEDICINE

## 2024-05-18 PROCEDURE — 5A1935Z RESPIRATORY VENTILATION, LESS THAN 24 CONSECUTIVE HOURS: ICD-10-PCS | Performed by: INTERNAL MEDICINE

## 2024-05-18 PROCEDURE — 82140 ASSAY OF AMMONIA: CPT | Performed by: PHYSICIAN ASSISTANT

## 2024-05-18 PROCEDURE — 87641 MR-STAPH DNA AMP PROBE: CPT | Performed by: INTERNAL MEDICINE

## 2024-05-18 PROCEDURE — 82378 CARCINOEMBRYONIC ANTIGEN: CPT | Performed by: PSYCHIATRY & NEUROLOGY

## 2024-05-18 PROCEDURE — 85379 FIBRIN DEGRADATION QUANT: CPT | Performed by: PSYCHIATRY & NEUROLOGY

## 2024-05-18 PROCEDURE — 02HV33Z INSERTION OF INFUSION DEVICE INTO SUPERIOR VENA CAVA, PERCUTANEOUS APPROACH: ICD-10-PCS | Performed by: INTERNAL MEDICINE

## 2024-05-18 PROCEDURE — 86038 ANTINUCLEAR ANTIBODIES: CPT | Performed by: PSYCHIATRY & NEUROLOGY

## 2024-05-18 PROCEDURE — 82805 BLOOD GASES W/O2 SATURATION: CPT | Performed by: PHYSICIAN ASSISTANT

## 2024-05-18 PROCEDURE — 83655 ASSAY OF LEAD: CPT | Performed by: PSYCHIATRY & NEUROLOGY

## 2024-05-18 PROCEDURE — 70551 MRI BRAIN STEM W/O DYE: CPT

## 2024-05-18 PROCEDURE — 25010000002 LEVETIRACETAM IN NACL 0.75% 1000 MG/100ML SOLUTION: Performed by: PHYSICIAN ASSISTANT

## 2024-05-18 PROCEDURE — 84100 ASSAY OF PHOSPHORUS: CPT | Performed by: PHYSICIAN ASSISTANT

## 2024-05-18 PROCEDURE — 36600 WITHDRAWAL OF ARTERIAL BLOOD: CPT | Performed by: PHYSICIAN ASSISTANT

## 2024-05-18 PROCEDURE — 83050 HGB METHEMOGLOBIN QUAN: CPT | Performed by: PHYSICIAN ASSISTANT

## 2024-05-18 PROCEDURE — 86611 BARTONELLA ANTIBODY: CPT | Performed by: PSYCHIATRY & NEUROLOGY

## 2024-05-18 PROCEDURE — 83605 ASSAY OF LACTIC ACID: CPT | Performed by: INTERNAL MEDICINE

## 2024-05-18 PROCEDURE — 25010000002 LORAZEPAM PER 2 MG

## 2024-05-18 PROCEDURE — 87040 BLOOD CULTURE FOR BACTERIA: CPT | Performed by: INTERNAL MEDICINE

## 2024-05-18 PROCEDURE — 87484 EHRLICHA CHAFFEENSIS AMP PRB: CPT | Performed by: PSYCHIATRY & NEUROLOGY

## 2024-05-18 PROCEDURE — 25010000002 THIAMINE HCL 200 MG/2ML SOLUTION: Performed by: PSYCHIATRY & NEUROLOGY

## 2024-05-18 PROCEDURE — 86606 ASPERGILLUS ANTIBODY: CPT | Performed by: PSYCHIATRY & NEUROLOGY

## 2024-05-18 PROCEDURE — 25010000002 ENOXAPARIN PER 10 MG: Performed by: INTERNAL MEDICINE

## 2024-05-18 PROCEDURE — 87468 ANAPLSMA PHGCYTOPHLM AMP PRB: CPT | Performed by: PSYCHIATRY & NEUROLOGY

## 2024-05-18 PROCEDURE — 80048 BASIC METABOLIC PNL TOTAL CA: CPT | Performed by: PHYSICIAN ASSISTANT

## 2024-05-18 PROCEDURE — 0202U NFCT DS 22 TRGT SARS-COV-2: CPT | Performed by: INTERNAL MEDICINE

## 2024-05-18 PROCEDURE — 76937 US GUIDE VASCULAR ACCESS: CPT | Performed by: NURSE PRACTITIONER

## 2024-05-18 PROCEDURE — 81003 URINALYSIS AUTO W/O SCOPE: CPT | Performed by: INTERNAL MEDICINE

## 2024-05-18 PROCEDURE — 36556 INSERT NON-TUNNEL CV CATH: CPT | Performed by: NURSE PRACTITIONER

## 2024-05-18 PROCEDURE — 82175 ASSAY OF ARSENIC: CPT | Performed by: PSYCHIATRY & NEUROLOGY

## 2024-05-18 PROCEDURE — 82948 REAGENT STRIP/BLOOD GLUCOSE: CPT

## 2024-05-18 RX ORDER — LEVETIRACETAM 5 MG/ML
500 INJECTION INTRAVASCULAR EVERY 12 HOURS SCHEDULED
Status: DISCONTINUED | OUTPATIENT
Start: 2024-05-18 | End: 2024-05-18

## 2024-05-18 RX ORDER — LACTULOSE 10 G/15ML
30 SOLUTION ORAL 3 TIMES DAILY
Status: DISCONTINUED | OUTPATIENT
Start: 2024-05-19 | End: 2024-05-19

## 2024-05-18 RX ORDER — FENTANYL CITRATE-0.9 % NACL/PF 10 MCG/ML
50-300 PLASTIC BAG, INJECTION (ML) INTRAVENOUS
Status: DISCONTINUED | OUTPATIENT
Start: 2024-05-18 | End: 2024-05-21

## 2024-05-18 RX ORDER — AMOXICILLIN 250 MG
2 CAPSULE ORAL 2 TIMES DAILY
Status: DISCONTINUED | OUTPATIENT
Start: 2024-05-18 | End: 2024-05-19

## 2024-05-18 RX ORDER — LORAZEPAM 2 MG/ML
4 INJECTION INTRAMUSCULAR ONCE AS NEEDED
Status: COMPLETED | OUTPATIENT
Start: 2024-05-18 | End: 2024-05-18

## 2024-05-18 RX ORDER — LEVETIRACETAM 10 MG/ML
1000 INJECTION INTRAVASCULAR ONCE
Status: COMPLETED | OUTPATIENT
Start: 2024-05-18 | End: 2024-05-18

## 2024-05-18 RX ORDER — FENTANYL CITRATE 50 UG/ML
100 INJECTION, SOLUTION INTRAMUSCULAR; INTRAVENOUS ONCE
Status: DISCONTINUED | OUTPATIENT
Start: 2024-05-18 | End: 2024-05-19

## 2024-05-18 RX ORDER — ROCURONIUM BROMIDE 10 MG/ML
50 INJECTION, SOLUTION INTRAVENOUS ONCE
Status: COMPLETED | OUTPATIENT
Start: 2024-05-18 | End: 2024-05-18

## 2024-05-18 RX ORDER — CHLORHEXIDINE GLUCONATE 0.12 MG/ML
15 RINSE ORAL EVERY 12 HOURS SCHEDULED
Status: DISCONTINUED | OUTPATIENT
Start: 2024-05-18 | End: 2024-05-29 | Stop reason: HOSPADM

## 2024-05-18 RX ORDER — ETOMIDATE 2 MG/ML
20 INJECTION INTRAVENOUS ONCE
Status: COMPLETED | OUTPATIENT
Start: 2024-05-18 | End: 2024-05-18

## 2024-05-18 RX ORDER — LEVETIRACETAM 5 MG/ML
500 INJECTION INTRAVASCULAR EVERY 6 HOURS SCHEDULED
Status: DISCONTINUED | OUTPATIENT
Start: 2024-05-18 | End: 2024-05-18

## 2024-05-18 RX ORDER — BISACODYL 5 MG/1
5 TABLET, DELAYED RELEASE ORAL DAILY PRN
Status: DISCONTINUED | OUTPATIENT
Start: 2024-05-18 | End: 2024-05-19

## 2024-05-18 RX ORDER — BISACODYL 10 MG
10 SUPPOSITORY, RECTAL RECTAL DAILY PRN
Status: DISCONTINUED | OUTPATIENT
Start: 2024-05-18 | End: 2024-05-19

## 2024-05-18 RX ORDER — MIDAZOLAM HYDROCHLORIDE 2 MG/2ML
2 INJECTION, SOLUTION INTRAMUSCULAR; INTRAVENOUS ONCE
Status: COMPLETED | OUTPATIENT
Start: 2024-05-18 | End: 2024-05-18

## 2024-05-18 RX ORDER — POLYETHYLENE GLYCOL 3350 17 G/17G
17 POWDER, FOR SOLUTION ORAL DAILY PRN
Status: DISCONTINUED | OUTPATIENT
Start: 2024-05-18 | End: 2024-05-19

## 2024-05-18 RX ORDER — NOREPINEPHRINE BITARTRATE 0.03 MG/ML
.02-.3 INJECTION, SOLUTION INTRAVENOUS
Status: DISCONTINUED | OUTPATIENT
Start: 2024-05-18 | End: 2024-05-21

## 2024-05-18 RX ORDER — LEVETIRACETAM 5 MG/ML
500 INJECTION INTRAVASCULAR EVERY 6 HOURS SCHEDULED
Status: DISCONTINUED | OUTPATIENT
Start: 2024-05-19 | End: 2024-05-29 | Stop reason: HOSPADM

## 2024-05-18 RX ORDER — LORAZEPAM 2 MG/ML
INJECTION INTRAMUSCULAR
Status: COMPLETED
Start: 2024-05-18 | End: 2024-05-18

## 2024-05-18 RX ORDER — MIDAZOLAM HYDROCHLORIDE 2 MG/2ML
INJECTION, SOLUTION INTRAMUSCULAR; INTRAVENOUS
Status: COMPLETED
Start: 2024-05-18 | End: 2024-05-18

## 2024-05-18 RX ORDER — THIAMINE HYDROCHLORIDE 100 MG/ML
100 INJECTION, SOLUTION INTRAMUSCULAR; INTRAVENOUS EVERY 8 HOURS
Status: COMPLETED | OUTPATIENT
Start: 2024-05-18 | End: 2024-05-25

## 2024-05-18 RX ADMIN — INSULIN LISPRO 2 UNITS: 100 INJECTION, SOLUTION INTRAVENOUS; SUBCUTANEOUS at 23:39

## 2024-05-18 RX ADMIN — MIDAZOLAM HYDROCHLORIDE 2 MG: 1 INJECTION, SOLUTION INTRAMUSCULAR; INTRAVENOUS at 19:25

## 2024-05-18 RX ADMIN — BUSPIRONE HYDROCHLORIDE 7.5 MG: 7.5 TABLET ORAL at 15:35

## 2024-05-18 RX ADMIN — ROCURONIUM BROMIDE 50 MG: 10 INJECTION, SOLUTION INTRAVENOUS at 19:27

## 2024-05-18 RX ADMIN — MIDAZOLAM HYDROCHLORIDE 2 MG: 1 INJECTION, SOLUTION INTRAMUSCULAR; INTRAVENOUS at 19:31

## 2024-05-18 RX ADMIN — LORAZEPAM 4 MG: 2 INJECTION INTRAMUSCULAR at 19:05

## 2024-05-18 RX ADMIN — ETOMIDATE 20 MG: 40 INJECTION, SOLUTION INTRAVENOUS at 19:24

## 2024-05-18 RX ADMIN — RIFAXIMIN 550 MG: 550 TABLET ORAL at 08:19

## 2024-05-18 RX ADMIN — ETOMIDATE 20 MG: 40 INJECTION, SOLUTION INTRAVENOUS at 19:23

## 2024-05-18 RX ADMIN — BUSPIRONE HYDROCHLORIDE 7.5 MG: 7.5 TABLET ORAL at 08:23

## 2024-05-18 RX ADMIN — ENOXAPARIN SODIUM 40 MG: 100 INJECTION SUBCUTANEOUS at 23:34

## 2024-05-18 RX ADMIN — SERTRALINE HYDROCHLORIDE 100 MG: 100 TABLET, FILM COATED ORAL at 23:16

## 2024-05-18 RX ADMIN — FUROSEMIDE 40 MG: 40 TABLET ORAL at 08:20

## 2024-05-18 RX ADMIN — PROPOFOL 5 MCG/KG/MIN: 10 INJECTION, EMULSION INTRAVENOUS at 21:00

## 2024-05-18 RX ADMIN — Medication 50 MCG/HR: at 23:30

## 2024-05-18 RX ADMIN — CARVEDILOL 6.25 MG: 6.25 TABLET, FILM COATED ORAL at 08:19

## 2024-05-18 RX ADMIN — SUCRALFATE 1 G: 1 TABLET ORAL at 08:19

## 2024-05-18 RX ADMIN — Medication 10 ML: at 08:20

## 2024-05-18 RX ADMIN — BUSPIRONE HYDROCHLORIDE 7.5 MG: 7.5 TABLET ORAL at 23:28

## 2024-05-18 RX ADMIN — PANTOPRAZOLE SODIUM 40 MG: 40 TABLET, DELAYED RELEASE ORAL at 08:19

## 2024-05-18 RX ADMIN — CHLORHEXIDINE GLUCONATE 15 ML: 1.2 RINSE ORAL at 23:30

## 2024-05-18 RX ADMIN — ACETAMINOPHEN 650 MG: 325 TABLET ORAL at 15:35

## 2024-05-18 RX ADMIN — LEVETIRACETAM 1000 MG: 10 INJECTION, SOLUTION INTRAVENOUS at 20:40

## 2024-05-18 RX ADMIN — LORAZEPAM 4 MG: 2 INJECTION INTRAMUSCULAR; INTRAVENOUS at 19:05

## 2024-05-18 RX ADMIN — MIDAZOLAM HYDROCHLORIDE 2 MG: 2 INJECTION, SOLUTION INTRAMUSCULAR; INTRAVENOUS at 19:31

## 2024-05-18 RX ADMIN — Medication 1000 UNITS: at 08:19

## 2024-05-18 RX ADMIN — ENOXAPARIN SODIUM 40 MG: 100 INJECTION SUBCUTANEOUS at 12:12

## 2024-05-18 RX ADMIN — SENNOSIDES AND DOCUSATE SODIUM 2 TABLET: 50; 8.6 TABLET ORAL at 23:29

## 2024-05-18 RX ADMIN — RIFAXIMIN 550 MG: 550 TABLET ORAL at 23:29

## 2024-05-18 RX ADMIN — THIAMINE HYDROCHLORIDE 100 MG: 100 INJECTION, SOLUTION INTRAMUSCULAR; INTRAVENOUS at 23:16

## 2024-05-18 RX ADMIN — CARVEDILOL 6.25 MG: 6.25 TABLET, FILM COATED ORAL at 23:34

## 2024-05-18 RX ADMIN — SUCRALFATE 1 G: 1 TABLET ORAL at 23:28

## 2024-05-18 RX ADMIN — SUCRALFATE 1 G: 1 TABLET ORAL at 12:13

## 2024-05-18 NOTE — SIGNIFICANT NOTE
Code Blue called overhead to MRI.      Patient had completed MRI of head.  Patient was talking with MRI tech.   Patient went unresponsive and agonal respirations.   Staff arrived for assessment.   Breathing and with pulse, breathing labored, not responding.   Oxygen placed, glucose checked, patient transferred to bed from chair.  Pads placed, EKG obtained.  BP obtained.   Patient started moving his arm and talking to staff, groggy.   ABG obtained.       Patient transferred to CCU.    Patient was alert and able to answer questions.  He was asking about his stuff.      Patient was started on Keppra.  Neurology was consulted.    Rapid EEG ordered.    Regular EEG ordered.

## 2024-05-18 NOTE — PLAN OF CARE
Goal Outcome Evaluation:      Patient complained of pain and medicated per orders, vitals stable, had MRI today.

## 2024-05-18 NOTE — NURSING NOTE
Patient was down in MRI when a code blue was called over head, MRI tech called floor and stated patient was being coded, when arrived to MRI area, code team was finishing up care and patient transferred to CCU, report given at bedside.

## 2024-05-18 NOTE — PROGRESS NOTES
[facility]   Hospitalist Progress Note  Date: 2024  Patient Name: Nic Nicolas  : 1965  MRN: [id number]  Date of admission: 2024      Subjective   Subjective     Chief Complaint: Follow up for confusion    Summary: 58 y/o M with cirrhosis of the liver due to ROMO can have a hepatic encephalopathy, DVT in the past, diabetes on insulin, hypertension, obstructive sleep apnea, TBI, and asthma/COPD who presents with confusion. VSS. CBC shows no leukocytosis but does show a hemoglobin of 10.7 which is above his baseline.  CMP shows a slightly elevated bilirubin at 2.4.  Ammonia level is 96.  UA shows moderate blood, positive nitrite, small leuk esterase trace bacteria.  CT of the head shows global/frontal lobe atrophy.  Patient was given 2 g of ceftriaxone and started on lactulose q4 hours.  Gradual improvement in mentation.  Ammonia normalized.  No ascites on ultrasound.    Interval Followup:   Afebrile.  Blood pressure control  Seems to be doing much better from mentation standpoint today and has gradually improved over the past week  His sister was at bedside this morning.  I discussed results of CT head with her and the patient including findings of frontal lobe atrophy.  She notes a change in his behavior and more labile mood over the past year.  Discussed options and they would like to get an MRI scan    Review of Systems  Respiratory: No Dyspnea  Gastrointestinal:  No Nausea, No Vomiting, No constipation    Objective   Objective     Vitals:   Temp:  [97.7 °F (36.5 °C)-98.6 °F (37 °C)] 97.7 °F (36.5 °C)  Heart Rate:  [66-89] 72  Resp:  [19-22] 20  BP: (120-167)/(40-76) 167/71  Physical Exam    Constitutional: Morbidly obese male, resting in bed, NAD   Respiratory: Clear, nonlabored respirations    Cardiovascular:  RRR, bilateral pedal edema   Gastrointestinal: Distended but soft, no localized tenderness to palpation, bowel sounds present throughout   Neurologic: A&Ox2, CN grossly intact,  speech clear   Skin: Extremities warm    Result Review    Result Review:  I have personally reviewed the following over the last 24 hours (07:00 to 07:00) and agree with the following findings  [x]  Laboratory  CBC          5/15/2024    05:06 5/16/2024    05:03 5/17/2024    04:51   CBC   WBC 4.27  3.88  4.90    RBC 3.66  3.55  3.54    Hemoglobin 10.1  9.9  9.9    Hematocrit 31.9  30.4  30.5    MCV 87.2  85.6  86.2    MCH 27.6  27.9  28.0    MCHC 31.7  32.6  32.5    RDW 19.3  18.8  18.6    Platelets 69  70  65      CMP          5/16/2024    05:03 5/17/2024    04:51 5/18/2024    04:59   CMP   Glucose 135  170  118    BUN 21  24  25    Creatinine 1.09  1.32  1.03    EGFR 78.7  62.5  84.2    Sodium 140  135  138    Potassium 3.4  3.7  3.8    Chloride 106  102  103    Calcium 9.1  8.8  9.0    Total Protein 5.8  5.9     Albumin 2.9  3.0     Globulin 2.9  2.9     Total Bilirubin 1.3  1.4     Alkaline Phosphatase 76  76     AST (SGOT) 36  36     ALT (SGPT) 17  18     Albumin/Globulin Ratio 1.0  1.0     BUN/Creatinine Ratio 19.3  18.2  24.3    Anion Gap 11.1  11.6  12.8      [x]  Microbiology  Blood culture NGTD  Urine culture >100,000 CFU/mL Normal Urogenital Rachel   []  Radiology  []  EKG/Telemetry   []  Cardiology/Vascular   []  Pathology  []  Old records  []  Other:    Assessment & Plan   Assessment / Plan     Assessment/Plan:  Fever/abdominal distention; rule out SBP  Hepatic encephalopathy  ROMO cirrhosis  Chronic thrombocytopenia  Chronic anemia  Frontal lobe atrophy  History of GI bleed secondary to peptic ulcer  History of GERD without esophagitis  Essential hypertension  Insulin-dependent type 2 diabetes  Anxiety and depression  Morbid obesity BMI 43       Mentation has improved gradually since admission.  Was fully oriented today and even remembered our discussion CT scan findings from yesterday.  Discussed CT findings with his sister and possibility for frontotemporal dementia given reported change in behavior  and mood over the past year.  Will get MRI of the brain without contrast to see if there has been any change from previous MRI last year.    No further fevers, white count remains normal and microbiology was negative. Not enough ascites seen on ultrasound for paracentesis. Completed empiric course of Rocephin  Continue lactulose 20 g 2 times daily.  Titrate to 2-3 bowel movements per day.    Continue rifaximin 550 mg every 12 hours  Platelets 65,000. No bleeding events.  Trend and transfuse for platelets less than 10k or less than 20k with bleeding  Hemoglobin 9.9; stable monitor and transfuse for hemoglobin less than 7  Continue Protonix 40 mg daily.  Start Carafate  Creatinine down trended.  Continue Lasix 40 mg daily. Continue to hold Aldactone.  Trend renal function electrolytes with daily BMP.  Monitor closely for renal toxicity  Blood sugars controlled.  Continue low-dose SSI per protocol.  Monitor for hypoglycemia and hyperglycemia.  Blood pressures controlled.  Continue Coreg 6.25 mg 2 times daily  Continue BuSpar 7.5 mg three times daily.   Continue Zoloft 100 mg nightly  Continue to hold oxycodone IR and avoid narcotics  Continue Tylenol 650 mg every 6 hours as needed.  Not to exceed 2 g/day  Maintain SCDs  Low-sodium/diabetic diet  VTE ppx: Lovenox  CBC, CMP in a.m.    Discussed plan with RN  Discussed with SW/CM.  Has bed hold at [place].     DVT prophylaxis:  Medical and mechanical DVT prophylaxis orders are present.        CODE STATUS:   Level Of Support Discussed With: Patient  Code Status (Patient has no pulse and is not breathing): CPR (Attempt to Resuscitate)  Medical Interventions (Patient has pulse or is breathing): Full Support    Electronically signed by Ron Strong DO, 05/18/24, 1:39 PM EDT.

## 2024-05-19 ENCOUNTER — APPOINTMENT (OUTPATIENT)
Dept: GENERAL RADIOLOGY | Facility: HOSPITAL | Age: 59
End: 2024-05-19
Payer: MEDICAID

## 2024-05-19 LAB
ALBUMIN SERPL-MCNC: 3 G/DL (ref 3.5–5.2)
ALBUMIN/GLOB SERPL: 1.1 G/DL
ALP SERPL-CCNC: 71 U/L (ref 39–117)
ALT SERPL W P-5'-P-CCNC: 18 U/L (ref 1–41)
ANION GAP SERPL CALCULATED.3IONS-SCNC: 12.9 MMOL/L (ref 5–15)
AST SERPL-CCNC: 35 U/L (ref 1–40)
B PARAPERT DNA SPEC QL NAA+PROBE: NOT DETECTED
B PERT DNA SPEC QL NAA+PROBE: NOT DETECTED
BACTERIA BLD CULT: ABNORMAL
BILIRUB SERPL-MCNC: 1.2 MG/DL (ref 0–1.2)
BOTTLE TYPE: ABNORMAL
BUN SERPL-MCNC: 30 MG/DL (ref 6–20)
BUN/CREAT SERPL: 22.1 (ref 7–25)
C PNEUM DNA NPH QL NAA+NON-PROBE: NOT DETECTED
CALCIUM SPEC-SCNC: 8.8 MG/DL (ref 8.6–10.5)
CEA SERPL-MCNC: 2.67 NG/ML
CHLORIDE SERPL-SCNC: 100 MMOL/L (ref 98–107)
CO2 SERPL-SCNC: 22.1 MMOL/L (ref 22–29)
CREAT SERPL-MCNC: 1.36 MG/DL (ref 0.76–1.27)
D-LACTATE SERPL-SCNC: 1.5 MMOL/L (ref 0.5–2)
DEPRECATED RDW RBC AUTO: 59 FL (ref 37–54)
EGFRCR SERPLBLD CKD-EPI 2021: 60.3 ML/MIN/1.73
ERYTHROCYTE [DISTWIDTH] IN BLOOD BY AUTOMATED COUNT: 18.5 % (ref 12.3–15.4)
FLUAV SUBTYP SPEC NAA+PROBE: NOT DETECTED
FLUBV RNA ISLT QL NAA+PROBE: NOT DETECTED
GLOBULIN UR ELPH-MCNC: 2.8 GM/DL
GLUCOSE BLDC GLUCOMTR-MCNC: 118 MG/DL (ref 70–99)
GLUCOSE BLDC GLUCOMTR-MCNC: 120 MG/DL (ref 70–99)
GLUCOSE BLDC GLUCOMTR-MCNC: 130 MG/DL (ref 70–99)
GLUCOSE BLDC GLUCOMTR-MCNC: 146 MG/DL (ref 70–99)
GLUCOSE BLDC GLUCOMTR-MCNC: 163 MG/DL (ref 70–99)
GLUCOSE SERPL-MCNC: 130 MG/DL (ref 65–99)
HADV DNA SPEC NAA+PROBE: NOT DETECTED
HCOV 229E RNA SPEC QL NAA+PROBE: NOT DETECTED
HCOV HKU1 RNA SPEC QL NAA+PROBE: NOT DETECTED
HCOV NL63 RNA SPEC QL NAA+PROBE: NOT DETECTED
HCOV OC43 RNA SPEC QL NAA+PROBE: NOT DETECTED
HCT VFR BLD AUTO: 31.3 % (ref 37.5–51)
HGB BLD-MCNC: 10.2 G/DL (ref 13–17.7)
HMPV RNA NPH QL NAA+NON-PROBE: NOT DETECTED
HPIV1 RNA ISLT QL NAA+PROBE: NOT DETECTED
HPIV2 RNA SPEC QL NAA+PROBE: NOT DETECTED
HPIV3 RNA NPH QL NAA+PROBE: NOT DETECTED
HPIV4 P GENE NPH QL NAA+PROBE: NOT DETECTED
M PNEUMO IGG SER IA-ACNC: NOT DETECTED
MAGNESIUM SERPL-MCNC: 1.8 MG/DL (ref 1.6–2.6)
MCH RBC QN AUTO: 28.4 PG (ref 26.6–33)
MCHC RBC AUTO-ENTMCNC: 32.6 G/DL (ref 31.5–35.7)
MCV RBC AUTO: 87.2 FL (ref 79–97)
PHOSPHATE SERPL-MCNC: 4.7 MG/DL (ref 2.5–4.5)
PLATELET # BLD AUTO: 66 10*3/MM3 (ref 140–450)
PMV BLD AUTO: 11.3 FL (ref 6–12)
POTASSIUM SERPL-SCNC: 3.6 MMOL/L (ref 3.5–5.2)
PROT SERPL-MCNC: 5.8 G/DL (ref 6–8.5)
QT INTERVAL: 417 MS
QTC INTERVAL: 483 MS
RBC # BLD AUTO: 3.59 10*6/MM3 (ref 4.14–5.8)
RHINOVIRUS RNA SPEC NAA+PROBE: NOT DETECTED
RSV RNA NPH QL NAA+NON-PROBE: NOT DETECTED
SARS-COV-2 RNA RESP QL NAA+PROBE: NOT DETECTED
SODIUM SERPL-SCNC: 135 MMOL/L (ref 136–145)
WBC NRBC COR # BLD AUTO: 4.39 10*3/MM3 (ref 3.4–10.8)

## 2024-05-19 PROCEDURE — 82948 REAGENT STRIP/BLOOD GLUCOSE: CPT | Performed by: INTERNAL MEDICINE

## 2024-05-19 PROCEDURE — 74018 RADEX ABDOMEN 1 VIEW: CPT

## 2024-05-19 PROCEDURE — 25010000002 ENOXAPARIN PER 10 MG: Performed by: INTERNAL MEDICINE

## 2024-05-19 PROCEDURE — 84100 ASSAY OF PHOSPHORUS: CPT | Performed by: PHYSICIAN ASSISTANT

## 2024-05-19 PROCEDURE — 99291 CRITICAL CARE FIRST HOUR: CPT | Performed by: INTERNAL MEDICINE

## 2024-05-19 PROCEDURE — 63710000001 INSULIN LISPRO (HUMAN) PER 5 UNITS: Performed by: INTERNAL MEDICINE

## 2024-05-19 PROCEDURE — 87798 DETECT AGENT NOS DNA AMP: CPT | Performed by: PSYCHIATRY & NEUROLOGY

## 2024-05-19 PROCEDURE — 83605 ASSAY OF LACTIC ACID: CPT | Performed by: INTERNAL MEDICINE

## 2024-05-19 PROCEDURE — 94799 UNLISTED PULMONARY SVC/PX: CPT

## 2024-05-19 PROCEDURE — 83735 ASSAY OF MAGNESIUM: CPT | Performed by: INTERNAL MEDICINE

## 2024-05-19 PROCEDURE — 25010000002 PROPOFOL 10 MG/ML EMULSION: Performed by: INTERNAL MEDICINE

## 2024-05-19 PROCEDURE — 80053 COMPREHEN METABOLIC PANEL: CPT | Performed by: INTERNAL MEDICINE

## 2024-05-19 PROCEDURE — 94003 VENT MGMT INPAT SUBQ DAY: CPT

## 2024-05-19 PROCEDURE — 85027 COMPLETE CBC AUTOMATED: CPT | Performed by: INTERNAL MEDICINE

## 2024-05-19 PROCEDURE — 25010000002 THIAMINE HCL 200 MG/2ML SOLUTION: Performed by: PSYCHIATRY & NEUROLOGY

## 2024-05-19 PROCEDURE — 87484 EHRLICHA CHAFFEENSIS AMP PRB: CPT | Performed by: PSYCHIATRY & NEUROLOGY

## 2024-05-19 PROCEDURE — 25010000002 LEVETIRACETAM IN NACL 0.82% 500 MG/100ML SOLUTION: Performed by: PSYCHIATRY & NEUROLOGY

## 2024-05-19 PROCEDURE — 87469 BABESIA MICROTI AMP PRB: CPT | Performed by: PSYCHIATRY & NEUROLOGY

## 2024-05-19 PROCEDURE — 82948 REAGENT STRIP/BLOOD GLUCOSE: CPT

## 2024-05-19 PROCEDURE — 25010000002 MAGNESIUM SULFATE 2 GM/50ML SOLUTION: Performed by: PHYSICIAN ASSISTANT

## 2024-05-19 RX ORDER — SERTRALINE HYDROCHLORIDE 100 MG/1
100 TABLET, FILM COATED ORAL NIGHTLY
Status: DISCONTINUED | OUTPATIENT
Start: 2024-05-19 | End: 2024-05-25

## 2024-05-19 RX ORDER — FUROSEMIDE 40 MG/1
40 TABLET ORAL DAILY
Status: DISCONTINUED | OUTPATIENT
Start: 2024-05-20 | End: 2024-05-25

## 2024-05-19 RX ORDER — LACTULOSE 10 G/15ML
30 SOLUTION ORAL 3 TIMES DAILY
Status: DISCONTINUED | OUTPATIENT
Start: 2024-05-19 | End: 2024-05-20

## 2024-05-19 RX ORDER — BUSPIRONE HYDROCHLORIDE 5 MG/1
7.5 TABLET ORAL 3 TIMES DAILY
Status: DISCONTINUED | OUTPATIENT
Start: 2024-05-19 | End: 2024-05-25

## 2024-05-19 RX ORDER — LACTULOSE 10 G/15ML
30 SOLUTION ORAL 3 TIMES DAILY
Status: DISCONTINUED | OUTPATIENT
Start: 2024-05-19 | End: 2024-05-19

## 2024-05-19 RX ORDER — MELATONIN
1000 DAILY
Status: DISCONTINUED | OUTPATIENT
Start: 2024-05-20 | End: 2024-05-25

## 2024-05-19 RX ORDER — GLYCOPYRROLATE 0.2 MG/ML
0.4 INJECTION INTRAMUSCULAR; INTRAVENOUS ONCE
Status: DISCONTINUED | OUTPATIENT
Start: 2024-05-19 | End: 2024-05-19

## 2024-05-19 RX ORDER — ACETAMINOPHEN 325 MG/1
650 TABLET ORAL EVERY 8 HOURS PRN
Status: DISCONTINUED | OUTPATIENT
Start: 2024-05-19 | End: 2024-05-25

## 2024-05-19 RX ORDER — UREA 10 %
5 LOTION (ML) TOPICAL NIGHTLY
Status: DISCONTINUED | OUTPATIENT
Start: 2024-05-19 | End: 2024-05-26

## 2024-05-19 RX ORDER — MAGNESIUM SULFATE HEPTAHYDRATE 40 MG/ML
2 INJECTION, SOLUTION INTRAVENOUS ONCE
Status: COMPLETED | OUTPATIENT
Start: 2024-05-19 | End: 2024-05-19

## 2024-05-19 RX ORDER — CARVEDILOL 6.25 MG/1
6.25 TABLET ORAL 2 TIMES DAILY WITH MEALS
Status: DISCONTINUED | OUTPATIENT
Start: 2024-05-19 | End: 2024-05-25

## 2024-05-19 RX ORDER — POLYETHYLENE GLYCOL 3350 17 G/17G
17 POWDER, FOR SOLUTION ORAL DAILY PRN
Status: DISCONTINUED | OUTPATIENT
Start: 2024-05-19 | End: 2024-05-25

## 2024-05-19 RX ORDER — PANTOPRAZOLE SODIUM 40 MG/10ML
40 INJECTION, POWDER, LYOPHILIZED, FOR SOLUTION INTRAVENOUS
Status: DISCONTINUED | OUTPATIENT
Start: 2024-05-20 | End: 2024-05-29 | Stop reason: HOSPADM

## 2024-05-19 RX ORDER — AMOXICILLIN 250 MG
2 CAPSULE ORAL 2 TIMES DAILY
Status: DISCONTINUED | OUTPATIENT
Start: 2024-05-19 | End: 2024-05-25

## 2024-05-19 RX ORDER — LACTULOSE 10 G/15ML
30 SOLUTION ORAL
Status: DISPENSED | OUTPATIENT
Start: 2024-05-19 | End: 2024-05-19

## 2024-05-19 RX ORDER — BISACODYL 5 MG/1
5 TABLET, DELAYED RELEASE ORAL DAILY PRN
Status: DISCONTINUED | OUTPATIENT
Start: 2024-05-19 | End: 2024-05-25

## 2024-05-19 RX ORDER — SUCRALFATE 1 G/1
1 TABLET ORAL
Status: DISCONTINUED | OUTPATIENT
Start: 2024-05-19 | End: 2024-05-25

## 2024-05-19 RX ORDER — CALCIUM CARBONATE 500 MG/1
1 TABLET, CHEWABLE ORAL 3 TIMES DAILY PRN
Status: DISCONTINUED | OUTPATIENT
Start: 2024-05-19 | End: 2024-05-25

## 2024-05-19 RX ORDER — BISACODYL 10 MG
10 SUPPOSITORY, RECTAL RECTAL DAILY PRN
Status: DISCONTINUED | OUTPATIENT
Start: 2024-05-19 | End: 2024-05-25

## 2024-05-19 RX ORDER — MORPHINE SULFATE 2 MG/ML
2 INJECTION, SOLUTION INTRAMUSCULAR; INTRAVENOUS
Status: DISCONTINUED | OUTPATIENT
Start: 2024-05-19 | End: 2024-05-19

## 2024-05-19 RX ADMIN — PROPOFOL 40 MCG/KG/MIN: 10 INJECTION, EMULSION INTRAVENOUS at 04:21

## 2024-05-19 RX ADMIN — ENOXAPARIN SODIUM 40 MG: 100 INJECTION SUBCUTANEOUS at 23:44

## 2024-05-19 RX ADMIN — Medication 1000 UNITS: at 09:10

## 2024-05-19 RX ADMIN — CARVEDILOL 6.25 MG: 6.25 TABLET, FILM COATED ORAL at 09:10

## 2024-05-19 RX ADMIN — Medication 150 MCG/HR: at 11:26

## 2024-05-19 RX ADMIN — PROPOFOL 40 MCG/KG/MIN: 10 INJECTION, EMULSION INTRAVENOUS at 07:52

## 2024-05-19 RX ADMIN — NOREPINEPHRINE BITARTRATE 0.04 MCG/KG/MIN: 0.03 INJECTION, SOLUTION INTRAVENOUS at 15:38

## 2024-05-19 RX ADMIN — PANTOPRAZOLE SODIUM 40 MG: 40 TABLET, DELAYED RELEASE ORAL at 09:10

## 2024-05-19 RX ADMIN — CHLORHEXIDINE GLUCONATE 15 ML: 1.2 RINSE ORAL at 21:39

## 2024-05-19 RX ADMIN — BUSPIRONE HYDROCHLORIDE 7.5 MG: 5 TABLET ORAL at 18:17

## 2024-05-19 RX ADMIN — PROPOFOL 20 MCG/KG/MIN: 10 INJECTION, EMULSION INTRAVENOUS at 21:38

## 2024-05-19 RX ADMIN — Medication 150 MCG/HR: at 04:35

## 2024-05-19 RX ADMIN — SUCRALFATE 1 G: 1 TABLET ORAL at 18:22

## 2024-05-19 RX ADMIN — Medication 75 MCG/HR: at 18:19

## 2024-05-19 RX ADMIN — MAGNESIUM SULFATE HEPTAHYDRATE 2 G: 40 INJECTION, SOLUTION INTRAVENOUS at 09:26

## 2024-05-19 RX ADMIN — FUROSEMIDE 40 MG: 40 TABLET ORAL at 09:10

## 2024-05-19 RX ADMIN — SERTRALINE HYDROCHLORIDE 100 MG: 100 TABLET, FILM COATED ORAL at 21:37

## 2024-05-19 RX ADMIN — LACTULOSE 30 G: 20 SOLUTION ORAL at 13:53

## 2024-05-19 RX ADMIN — LACTULOSE 30 G: 10 SOLUTION ORAL at 21:37

## 2024-05-19 RX ADMIN — ENOXAPARIN SODIUM 40 MG: 100 INJECTION SUBCUTANEOUS at 11:40

## 2024-05-19 RX ADMIN — SUCRALFATE 1 G: 1 TABLET ORAL at 09:10

## 2024-05-19 RX ADMIN — RIFAXIMIN 550 MG: 550 TABLET ORAL at 21:37

## 2024-05-19 RX ADMIN — THIAMINE HYDROCHLORIDE 100 MG: 100 INJECTION, SOLUTION INTRAMUSCULAR; INTRAVENOUS at 04:21

## 2024-05-19 RX ADMIN — THIAMINE HYDROCHLORIDE 100 MG: 100 INJECTION, SOLUTION INTRAMUSCULAR; INTRAVENOUS at 11:40

## 2024-05-19 RX ADMIN — Medication 10 ML: at 09:11

## 2024-05-19 RX ADMIN — LEVETIRACETAM 500 MG: 5 INJECTION, SOLUTION INTRAVENOUS at 01:07

## 2024-05-19 RX ADMIN — LEVETIRACETAM 500 MG: 5 INJECTION, SOLUTION INTRAVENOUS at 18:17

## 2024-05-19 RX ADMIN — LACTULOSE 30 G: 20 SOLUTION ORAL at 01:07

## 2024-05-19 RX ADMIN — LEVETIRACETAM 500 MG: 5 INJECTION, SOLUTION INTRAVENOUS at 11:40

## 2024-05-19 RX ADMIN — THIAMINE HYDROCHLORIDE 100 MG: 100 INJECTION, SOLUTION INTRAMUSCULAR; INTRAVENOUS at 21:40

## 2024-05-19 RX ADMIN — BUSPIRONE HYDROCHLORIDE 7.5 MG: 5 TABLET ORAL at 21:37

## 2024-05-19 RX ADMIN — SENNOSIDES AND DOCUSATE SODIUM 2 TABLET: 50; 8.6 TABLET ORAL at 21:37

## 2024-05-19 RX ADMIN — LACTULOSE 30 G: 20 SOLUTION ORAL at 13:51

## 2024-05-19 RX ADMIN — PROPOFOL 40 MCG/KG/MIN: 10 INJECTION, EMULSION INTRAVENOUS at 11:27

## 2024-05-19 RX ADMIN — LEVETIRACETAM 500 MG: 5 INJECTION, SOLUTION INTRAVENOUS at 06:24

## 2024-05-19 RX ADMIN — CHLORHEXIDINE GLUCONATE 15 ML: 1.2 RINSE ORAL at 09:11

## 2024-05-19 RX ADMIN — Medication 10 ML: at 21:39

## 2024-05-19 RX ADMIN — PROPOFOL 40 MCG/KG/MIN: 10 INJECTION, EMULSION INTRAVENOUS at 00:45

## 2024-05-19 RX ADMIN — LEVETIRACETAM 500 MG: 5 INJECTION, SOLUTION INTRAVENOUS at 23:44

## 2024-05-19 RX ADMIN — SUCRALFATE 1 G: 1 TABLET ORAL at 21:37

## 2024-05-19 RX ADMIN — INSULIN LISPRO 2 UNITS: 100 INJECTION, SOLUTION INTRAVENOUS; SUBCUTANEOUS at 22:27

## 2024-05-19 RX ADMIN — RIFAXIMIN 550 MG: 550 TABLET ORAL at 09:10

## 2024-05-19 RX ADMIN — SUCRALFATE 1 G: 1 TABLET ORAL at 06:30

## 2024-05-19 RX ADMIN — BUSPIRONE HYDROCHLORIDE 7.5 MG: 7.5 TABLET ORAL at 09:10

## 2024-05-19 NOTE — PROCEDURES
Procedure Name: Intubation     Indication: Respiratory failure, persistent seizure    Consent obtained: Emergent    Timeout: Preformed with nursing staff prior to beginning procedure.     Medications used:  Pre-medications: Etomidate 40 mg IV  Paralytics: Rocuronium 50 mg IV  Sedation: Versed 2 mg IV    Procedure: Patient was placed in the supine position. Patient preoxygenated with 100% O2 via BMV. RSI was initiated.  Glidescope was then used to visualize the vocal cords as 8.0 ET tube was placed in between the cords without difficulty.  Position was verified by auscultation, condensate noted in ET tube and with Etco2 monitoring. Device was secured at 22 cm at lip. Patient connected to the vent.     This x-ray was ordered to correct tube placement.    The patient tolerated the procedure well.    Complications: None    Electronically signed by BESSIE Puente, 05/18/24, 8:12 PM EDT.

## 2024-05-19 NOTE — PROGRESS NOTES
RT EQUIPMENT DEVICE RELATED - SKIN ASSESSMENT    RT Medical Equipment/Device:     ETT House/Anchorfast    Skin Assessment:      Cheek:  Intact  Neck:  Intact  Nose:  Intact  Mouth:  Intact    Device Skin Pressure Protection:  Skin-to-device areas padded:  Anchorfast    Nurse Notification:  Araseli Moore, RRT

## 2024-05-19 NOTE — PROGRESS NOTES
RT EQUIPMENT DEVICE RELATED - SKIN ASSESSMENT    RT Medical Equipment/Device:     ETT House/Anchorfast    Skin Assessment:      Cheek:  Intact  Neck:  Intact  Nose:  Intact  Mouth:  Intact    Device Skin Pressure Protection:  Skin-to-device areas padded:  Anchorfast    Nurse Notification:  Araseli Lyle, RRT

## 2024-05-19 NOTE — PLAN OF CARE
Goal Outcome Evaluation:   Pt intubated for seizures around 1930. Pt has been on acvc 20/450/+5 and has been weaned to 24%.
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
## 2024-05-19 NOTE — PLAN OF CARE
Goal Outcome Evaluation:      Patient was placed on PS this afternoon 10/5 24%.  Patient is tolerating PS well.  RSBI 17, patient is unable to do NIF at this time.

## 2024-05-19 NOTE — PROGRESS NOTES
RT EQUIPMENT DEVICE RELATED - SKIN ASSESSMENT    RT Medical Equipment/Device:     ETT House/Anchorfast    Skin Assessment:      Cheek:  Intact  Chin:  Intact  Nose:  Intact  Lips:  Intact    Device Skin Pressure Protection:  Skin-to-device areas padded:  Anchorfast    Nurse Notification:  Araseli Moore, RRT

## 2024-05-19 NOTE — PROCEDURES
CENTRAL LINE PLACEMENT NOTE  Indication: Hemodynamic instability and poor peripheral access  Consent obtained: Placed emergently  Time out: performed w/ RN at bedside.     Procedure:   The patient was placed in the supine position and the skin over the patient's right IJ vein was prepped with chlorhexidine. The patient was draped with full body drape, sterile procedure was used.  The skin was infiltrated with local anesthesia over the insertion site with 5 mL of 1% lidocaine under ultrasound guidance.  Large-bore needle was used to cannulate the vessel with return of dark blood.  The guidewire was inserted into the needle using the Seldinger technique and then needle was removed. Ultrasound was used to confirm the wire was in the vein. A small nick was made in the skin & vessel was dilated w/ dilator. A triple-lumen catheter was then placed into the vessel over the wire & wire was removed. All 3 ports freely flush & draw.  The catheter was securely fastened to the skin with suture. Next a sterile dressing was placed & a CXR ordered to confirm positioning of the line.     The patient tolerated the procedure well  Complications: no immediate complications noted      Procedure name: Ultrasound guidance for central line placement  Ultrasound was used to confirm cannulation of the right IJ with the finder needle during central line placement. Vessel was reviewed with ultrasound prior to procedure and was patent and lied just next to the right carotid. Ultrasound was used to assure correct placement of cannulating needle and picture was taken of guidewire being in correct placement prior to dilating vessel and image was uploaded to patient's electronic medical record.     Electronically signed by BESSIE Puente, 05/18/24, 8:11 PM EDT.

## 2024-05-19 NOTE — PROGRESS NOTES
[facility]   Hospitalist Progress Note  Date: 2024  Patient Name: Nic Nicolas  : 1965  MRN: [id number]  Date of admission: 2024      Subjective   Subjective     Chief Complaint: Follow up for confusion    Summary: 58 y/o M with cirrhosis of the liver due to ROMO can have a hepatic encephalopathy, DVT in the past, diabetes on insulin, hypertension, obstructive sleep apnea, TBI, and asthma/COPD who presents with confusion. VSS. CBC shows no leukocytosis but does show a hemoglobin of 10.7 which is above his baseline.  CMP shows a slightly elevated bilirubin at 2.4.  Ammonia level is 96.  UA shows moderate blood, positive nitrite, small leuk esterase trace bacteria.  CT of the head shows global/frontal lobe atrophy.  Patient was given 2 g of ceftriaxone and started on lactulose q4 hours.  Gradual improvement in mentation.  Ammonia normalized.  No ascites on ultrasound.    Interval Followup:   Had CODE BLUE called yesterday after getting MRI.  Patient became unresponsive, cyanotic with agonal breathing but never lost pulse.  Seizure suspected, received Keppra, transferred to ICU where he had 2 more seizures and had to be intubated    No fevers.  No hypotension.  Not requiring vasopressors  On minimal vent settings, tolerating spontaneous breathing trials  On propofol and fentanyl  Ammonia went back up to 171 in the span of 48 hours.  Looks like he had refused a couple doses of lactulose on   Last BM charted     Review of Systems  UTO    Objective   Objective     Vitals:   Temp:  [95 °F (35 °C)-99.1 °F (37.3 °C)] 97.8 °F (36.6 °C)  Heart Rate:  [] 83  Resp:  [19-23] 19  BP: ()/() 100/55  FiO2 (%):  [24 %-40 %] 24 %  Physical Exam    Constitutional: Morbidly obese male, sedated, on ventilator   HEENT: NCAT, OP with ET tube   Respiratory: Equal vent sounds bilaterally   Cardiovascular: RRR, trace pedal edema   Gastrointestinal: Distended but soft, bowel sounds present  throughout   Neurologic: Sedated   Skin: Extremities warm    Result Review    Result Review:  I have personally reviewed the following over the last 24 hours (07:00 to 07:00) and agree with the following findings  [x]  Laboratory  CBC          5/16/2024    05:03 5/17/2024    04:51 5/19/2024    04:31   CBC   WBC 3.88  4.90  4.39    RBC 3.55  3.54  3.59    Hemoglobin 9.9  9.9  10.2    Hematocrit 30.4  30.5  31.3    MCV 85.6  86.2  87.2    MCH 27.9  28.0  28.4    MCHC 32.6  32.5  32.6    RDW 18.8  18.6  18.5    Platelets 70  65  66      CMP          5/17/2024    04:51 5/18/2024    04:59 5/18/2024    18:19 5/18/2024    20:10 5/18/2024    20:46 5/19/2024    04:31   CMP   Glucose 170  118  156  166  175  130    BUN 24  25    27  30    Creatinine 1.32  1.03    1.33  1.36    EGFR 62.5  84.2    62.0  60.3    Sodium 135  138  136.2  133.9  136  135    Potassium 3.7  3.8    3.8  3.6    Chloride 102  103    100  100    Calcium 8.8  9.0    8.8  8.8    Total Protein 5.9      5.8    Albumin 3.0      3.0    Globulin 2.9      2.8    Total Bilirubin 1.4      1.2    Alkaline Phosphatase 76      71    AST (SGOT) 36      35    ALT (SGPT) 18      18    Albumin/Globulin Ratio 1.0      1.1    BUN/Creatinine Ratio 18.2  24.3    20.3  22.1    Anion Gap 11.6  12.8    14.2  12.9      [x]  Microbiology  Blood culture NGTD  Urine culture >100,000 CFU/mL Normal Urogenital Rachel   []  Radiology  []  EKG/Telemetry   []  Cardiology/Vascular   []  Pathology  []  Old records  []  Other:    Assessment & Plan   Assessment / Plan     Assessment/Plan:  Seizure  Hyperammonemia  Hepatic encephalopathy  Fever/abdominal distention; rule out SBP  ROMO cirrhosis  Chronic thrombocytopenia  Chronic anemia  Frontal lobe atrophy  History of GI bleed secondary to peptic ulcer  History of GERD without esophagitis  Essential hypertension  Insulin-dependent type 2 diabetes  Anxiety and depression  Morbid obesity BMI 43         Continue ICU level care.  Appreciate  intensive assistance  Had refused lactulose on 5/17 and ammonia went up to 171 which likely precipitated seizure as no other obvious etiology.  MRI of the brain that was obtained prior to seizure was unremarkable for acute pathology.  Hemodynamically stable and on minimal vent settings however has not had a bowel movement since 5/16 and do not want to extubate until he does  On fentanyl/propofol drips  Vent management per intensivist  Lactulose increased to 30 every hour x 3 doses then 3 times daily  Continue rifaximin 550 mg every 12 hours  Neurology consulted, appreciate recommendation  Continue IV Keppra 500 mg every 6 hour  No further fevers, white count remains normal and microbiology was negative. Not enough ascites seen on ultrasound for paracentesis. Completed empiric course of Rocephin  Platelets 66,000. No bleeding events.  Trend and transfuse for platelets less than 10k or less than 20k with bleeding  Hemoglobin 10.2; monitor and transfuse for hemoglobin less than 7  Continue Protonix 40 mg daily; switch to IV  Creatinine 1.36, a little up from baseline. Continue Lasix 40 mg daily. Continue to hold Aldactone.  Trend renal function electrolytes with daily BMP.  Monitor closely for renal toxicity  Blood sugars controlled.  Continue low-dose SSI per protocol. POC glucose every 6 hours.  Continue Coreg 6.25 mg with holding parameter  Continue BuSpar 7.5 mg three times daily.   Continue Zoloft 100 mg nightly  Low-sodium/diabetic diet  VTE ppx: Lovenox  CBC, CMP in a.m.    Discussed with Dr Campos  Discussed with SW/LUKAS.  Has bed hold at [place].     DVT prophylaxis:  Medical and mechanical DVT prophylaxis orders are present.    59-year-old male with acute seizure, decompensated cirrhosis, hyperammonemia  35 minutes of critical care provided including direct patient contact, review of chart, labs, pertinent imaging, high complexity decision making and discussion with physicians, staff, family      CODE  STATUS:   Level Of Support Discussed With: Patient  Code Status (Patient has no pulse and is not breathing): CPR (Attempt to Resuscitate)  Medical Interventions (Patient has pulse or is breathing): Full Support    Electronically signed by Ron Strong DO, 05/19/24, 4:17 PM EDT.
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
## 2024-05-20 ENCOUNTER — APPOINTMENT (OUTPATIENT)
Dept: GENERAL RADIOLOGY | Facility: HOSPITAL | Age: 59
End: 2024-05-20
Payer: MEDICAID

## 2024-05-20 ENCOUNTER — APPOINTMENT (OUTPATIENT)
Dept: NEUROLOGY | Facility: HOSPITAL | Age: 59
End: 2024-05-20
Payer: MEDICAID

## 2024-05-20 ENCOUNTER — APPOINTMENT (OUTPATIENT)
Dept: CARDIOLOGY | Facility: HOSPITAL | Age: 59
End: 2024-05-20
Payer: MEDICAID

## 2024-05-20 LAB
ALBUMIN SERPL-MCNC: 3.2 G/DL (ref 3.5–5.2)
ALBUMIN/GLOB SERPL: 1.1 G/DL
ALP SERPL-CCNC: 73 U/L (ref 39–117)
ALT SERPL W P-5'-P-CCNC: 19 U/L (ref 1–41)
ANION GAP SERPL CALCULATED.3IONS-SCNC: 13.1 MMOL/L (ref 5–15)
ASCENDING AORTA: 3.7 CM
AST SERPL-CCNC: 38 U/L (ref 1–40)
BACTERIA SPEC RESP CULT: NORMAL
BH CV ECHO MEAS - AO MAX PG: 16.5 MMHG
BH CV ECHO MEAS - AO MEAN PG: 10 MMHG
BH CV ECHO MEAS - AO ROOT DIAM: 3.5 CM
BH CV ECHO MEAS - AO V2 MAX: 203 CM/SEC
BH CV ECHO MEAS - AO V2 VTI: 44.8 CM
BH CV ECHO MEAS - AVA(I,D): 2.8 CM2
BH CV ECHO MEAS - EDV(CUBED): 109.9 ML
BH CV ECHO MEAS - EDV(MOD-SP2): 96.9 ML
BH CV ECHO MEAS - EDV(MOD-SP4): 100 ML
BH CV ECHO MEAS - EF(MOD-BP): 72.8 %
BH CV ECHO MEAS - EF(MOD-SP2): 69.8 %
BH CV ECHO MEAS - EF(MOD-SP4): 76 %
BH CV ECHO MEAS - ESV(CUBED): 18.4 ML
BH CV ECHO MEAS - ESV(MOD-SP2): 29.3 ML
BH CV ECHO MEAS - ESV(MOD-SP4): 24 ML
BH CV ECHO MEAS - FS: 44.9 %
BH CV ECHO MEAS - IVS/LVPW: 1.11 CM
BH CV ECHO MEAS - IVSD: 1.36 CM
BH CV ECHO MEAS - LA DIMENSION: 3.9 CM
BH CV ECHO MEAS - LAT PEAK E' VEL: 8.4 CM/SEC
BH CV ECHO MEAS - LV MASS(C)D: 243.6 GRAMS
BH CV ECHO MEAS - LV MAX PG: 11.7 MMHG
BH CV ECHO MEAS - LV MEAN PG: 7 MMHG
BH CV ECHO MEAS - LV V1 MAX: 171 CM/SEC
BH CV ECHO MEAS - LV V1 VTI: 40.1 CM
BH CV ECHO MEAS - LVIDD: 4.8 CM
BH CV ECHO MEAS - LVIDS: 2.6 CM
BH CV ECHO MEAS - LVOT AREA: 3.1 CM2
BH CV ECHO MEAS - LVOT DIAM: 2 CM
BH CV ECHO MEAS - LVPWD: 1.23 CM
BH CV ECHO MEAS - MED PEAK E' VEL: 7.3 CM/SEC
BH CV ECHO MEAS - MV A MAX VEL: 112 CM/SEC
BH CV ECHO MEAS - MV DEC SLOPE: 393.5 CM/SEC2
BH CV ECHO MEAS - MV DEC TIME: 0.28 SEC
BH CV ECHO MEAS - MV E MAX VEL: 99.4 CM/SEC
BH CV ECHO MEAS - MV E/A: 0.89
BH CV ECHO MEAS - MV MAX PG: 5.5 MMHG
BH CV ECHO MEAS - MV MEAN PG: 3 MMHG
BH CV ECHO MEAS - MV P1/2T: 93.8 MSEC
BH CV ECHO MEAS - MV V2 VTI: 33 CM
BH CV ECHO MEAS - MVA(P1/2T): 2.35 CM2
BH CV ECHO MEAS - MVA(VTI): 3.8 CM2
BH CV ECHO MEAS - RAP SYSTOLE: 3 MMHG
BH CV ECHO MEAS - RVDD: 2.6 CM
BH CV ECHO MEAS - RVSP: 28.4 MMHG
BH CV ECHO MEAS - SV(LVOT): 126 ML
BH CV ECHO MEAS - SV(MOD-SP2): 67.6 ML
BH CV ECHO MEAS - SV(MOD-SP4): 76 ML
BH CV ECHO MEAS - TR MAX PG: 25.4 MMHG
BH CV ECHO MEAS - TR MAX VEL: 252 CM/SEC
BH CV ECHO MEASUREMENTS AVERAGE E/E' RATIO: 12.66
BILIRUB SERPL-MCNC: 2.1 MG/DL (ref 0–1.2)
BUN SERPL-MCNC: 39 MG/DL (ref 6–20)
BUN/CREAT SERPL: 23.2 (ref 7–25)
CALCIUM SPEC-SCNC: 8.6 MG/DL (ref 8.6–10.5)
CHLORIDE SERPL-SCNC: 101 MMOL/L (ref 98–107)
CO2 SERPL-SCNC: 21.9 MMOL/L (ref 22–29)
CREAT SERPL-MCNC: 1.68 MG/DL (ref 0.76–1.27)
DEPRECATED RDW RBC AUTO: 59.5 FL (ref 37–54)
EGFRCR SERPLBLD CKD-EPI 2021: 46.8 ML/MIN/1.73
ERYTHROCYTE [DISTWIDTH] IN BLOOD BY AUTOMATED COUNT: 18.9 % (ref 12.3–15.4)
GLOBULIN UR ELPH-MCNC: 3 GM/DL
GLUCOSE BLDC GLUCOMTR-MCNC: 162 MG/DL (ref 70–99)
GLUCOSE BLDC GLUCOMTR-MCNC: 171 MG/DL (ref 70–99)
GLUCOSE BLDC GLUCOMTR-MCNC: 173 MG/DL (ref 70–99)
GLUCOSE BLDC GLUCOMTR-MCNC: 174 MG/DL (ref 70–99)
GLUCOSE BLDC GLUCOMTR-MCNC: 175 MG/DL (ref 70–99)
GLUCOSE BLDC GLUCOMTR-MCNC: 188 MG/DL (ref 70–99)
GLUCOSE SERPL-MCNC: 177 MG/DL (ref 65–99)
GRAM STN SPEC: NORMAL
HCT VFR BLD AUTO: 33.5 % (ref 37.5–51)
HGB BLD-MCNC: 11 G/DL (ref 13–17.7)
LEFT ATRIUM VOLUME INDEX: 32.5 ML/M2
MAGNESIUM SERPL-MCNC: 2.2 MG/DL (ref 1.6–2.6)
MCH RBC QN AUTO: 28.4 PG (ref 26.6–33)
MCHC RBC AUTO-ENTMCNC: 32.8 G/DL (ref 31.5–35.7)
MCV RBC AUTO: 86.3 FL (ref 79–97)
PHOSPHATE SERPL-MCNC: 4.6 MG/DL (ref 2.5–4.5)
PLATELET # BLD AUTO: 95 10*3/MM3 (ref 140–450)
PMV BLD AUTO: 11.1 FL (ref 6–12)
POTASSIUM SERPL-SCNC: 3.8 MMOL/L (ref 3.5–5.2)
PROT SERPL-MCNC: 6.2 G/DL (ref 6–8.5)
RBC # BLD AUTO: 3.88 10*6/MM3 (ref 4.14–5.8)
SODIUM SERPL-SCNC: 136 MMOL/L (ref 136–145)
WBC NRBC COR # BLD AUTO: 9.11 10*3/MM3 (ref 3.4–10.8)

## 2024-05-20 PROCEDURE — 95819 EEG AWAKE AND ASLEEP: CPT

## 2024-05-20 PROCEDURE — 85027 COMPLETE CBC AUTOMATED: CPT | Performed by: INTERNAL MEDICINE

## 2024-05-20 PROCEDURE — 82948 REAGENT STRIP/BLOOD GLUCOSE: CPT

## 2024-05-20 PROCEDURE — 94799 UNLISTED PULMONARY SVC/PX: CPT

## 2024-05-20 PROCEDURE — 83735 ASSAY OF MAGNESIUM: CPT | Performed by: INTERNAL MEDICINE

## 2024-05-20 PROCEDURE — 93306 TTE W/DOPPLER COMPLETE: CPT | Performed by: INTERNAL MEDICINE

## 2024-05-20 PROCEDURE — 25810000003 SODIUM CHLORIDE 0.9 % SOLUTION: Performed by: INTERNAL MEDICINE

## 2024-05-20 PROCEDURE — 99291 CRITICAL CARE FIRST HOUR: CPT | Performed by: INTERNAL MEDICINE

## 2024-05-20 PROCEDURE — 25010000002 ENOXAPARIN PER 10 MG: Performed by: INTERNAL MEDICINE

## 2024-05-20 PROCEDURE — 25010000002 PROPOFOL 10 MG/ML EMULSION: Performed by: INTERNAL MEDICINE

## 2024-05-20 PROCEDURE — 25010000002 LEVETIRACETAM IN NACL 0.82% 500 MG/100ML SOLUTION: Performed by: PSYCHIATRY & NEUROLOGY

## 2024-05-20 PROCEDURE — 63710000001 INSULIN LISPRO (HUMAN) PER 5 UNITS: Performed by: INTERNAL MEDICINE

## 2024-05-20 PROCEDURE — 87040 BLOOD CULTURE FOR BACTERIA: CPT | Performed by: PHYSICIAN ASSISTANT

## 2024-05-20 PROCEDURE — 84100 ASSAY OF PHOSPHORUS: CPT | Performed by: INTERNAL MEDICINE

## 2024-05-20 PROCEDURE — 25010000002 THIAMINE HCL 200 MG/2ML SOLUTION: Performed by: PSYCHIATRY & NEUROLOGY

## 2024-05-20 PROCEDURE — 80053 COMPREHEN METABOLIC PANEL: CPT | Performed by: INTERNAL MEDICINE

## 2024-05-20 PROCEDURE — 94003 VENT MGMT INPAT SUBQ DAY: CPT

## 2024-05-20 PROCEDURE — 93306 TTE W/DOPPLER COMPLETE: CPT

## 2024-05-20 PROCEDURE — 71045 X-RAY EXAM CHEST 1 VIEW: CPT

## 2024-05-20 RX ORDER — LACTULOSE 10 G/15ML
30 SOLUTION ORAL EVERY 6 HOURS
Status: DISCONTINUED | OUTPATIENT
Start: 2024-05-20 | End: 2024-05-22

## 2024-05-20 RX ORDER — SODIUM CHLORIDE 9 MG/ML
75 INJECTION, SOLUTION INTRAVENOUS CONTINUOUS
Status: ACTIVE | OUTPATIENT
Start: 2024-05-20 | End: 2024-05-21

## 2024-05-20 RX ORDER — LACTULOSE 10 G/15ML
30 SOLUTION ORAL EVERY 6 HOURS
Status: DISCONTINUED | OUTPATIENT
Start: 2024-05-20 | End: 2024-05-20

## 2024-05-20 RX ADMIN — INSULIN LISPRO 2 UNITS: 100 INJECTION, SOLUTION INTRAVENOUS; SUBCUTANEOUS at 13:02

## 2024-05-20 RX ADMIN — POLYETHYLENE GLYCOL 3350 17 G: 17 POWDER, FOR SOLUTION ORAL at 01:12

## 2024-05-20 RX ADMIN — INSULIN LISPRO 2 UNITS: 100 INJECTION, SOLUTION INTRAVENOUS; SUBCUTANEOUS at 21:19

## 2024-05-20 RX ADMIN — SERTRALINE HYDROCHLORIDE 100 MG: 100 TABLET, FILM COATED ORAL at 21:20

## 2024-05-20 RX ADMIN — THIAMINE HYDROCHLORIDE 100 MG: 100 INJECTION, SOLUTION INTRAMUSCULAR; INTRAVENOUS at 03:57

## 2024-05-20 RX ADMIN — BUSPIRONE HYDROCHLORIDE 7.5 MG: 5 TABLET ORAL at 18:22

## 2024-05-20 RX ADMIN — LEVETIRACETAM 500 MG: 5 INJECTION, SOLUTION INTRAVENOUS at 18:22

## 2024-05-20 RX ADMIN — INSULIN LISPRO 2 UNITS: 100 INJECTION, SOLUTION INTRAVENOUS; SUBCUTANEOUS at 10:28

## 2024-05-20 RX ADMIN — Medication 1000 UNITS: at 10:29

## 2024-05-20 RX ADMIN — RIFAXIMIN 550 MG: 550 TABLET ORAL at 21:20

## 2024-05-20 RX ADMIN — SENNOSIDES AND DOCUSATE SODIUM 2 TABLET: 50; 8.6 TABLET ORAL at 21:20

## 2024-05-20 RX ADMIN — BUSPIRONE HYDROCHLORIDE 7.5 MG: 5 TABLET ORAL at 10:29

## 2024-05-20 RX ADMIN — BUSPIRONE HYDROCHLORIDE 7.5 MG: 5 TABLET ORAL at 21:20

## 2024-05-20 RX ADMIN — PROPOFOL 20 MCG/KG/MIN: 10 INJECTION, EMULSION INTRAVENOUS at 02:46

## 2024-05-20 RX ADMIN — THIAMINE HYDROCHLORIDE 100 MG: 100 INJECTION, SOLUTION INTRAMUSCULAR; INTRAVENOUS at 21:24

## 2024-05-20 RX ADMIN — CHLORHEXIDINE GLUCONATE 15 ML: 1.2 RINSE ORAL at 10:33

## 2024-05-20 RX ADMIN — LACTULOSE 30 G: 20 SOLUTION ORAL at 10:28

## 2024-05-20 RX ADMIN — INSULIN LISPRO 2 UNITS: 100 INJECTION, SOLUTION INTRAVENOUS; SUBCUTANEOUS at 18:22

## 2024-05-20 RX ADMIN — PANTOPRAZOLE SODIUM 40 MG: 40 INJECTION, POWDER, FOR SOLUTION INTRAVENOUS at 05:11

## 2024-05-20 RX ADMIN — SODIUM CHLORIDE 75 ML/HR: 9 INJECTION, SOLUTION INTRAVENOUS at 21:37

## 2024-05-20 RX ADMIN — CARVEDILOL 6.25 MG: 6.25 TABLET, FILM COATED ORAL at 10:29

## 2024-05-20 RX ADMIN — ENOXAPARIN SODIUM 40 MG: 100 INJECTION SUBCUTANEOUS at 10:30

## 2024-05-20 RX ADMIN — SODIUM CHLORIDE 75 ML/HR: 9 INJECTION, SOLUTION INTRAVENOUS at 10:46

## 2024-05-20 RX ADMIN — CHLORHEXIDINE GLUCONATE 15 ML: 1.2 RINSE ORAL at 21:20

## 2024-05-20 RX ADMIN — RIFAXIMIN 550 MG: 550 TABLET ORAL at 10:28

## 2024-05-20 RX ADMIN — THIAMINE HYDROCHLORIDE 100 MG: 100 INJECTION, SOLUTION INTRAMUSCULAR; INTRAVENOUS at 13:02

## 2024-05-20 RX ADMIN — Medication 10 ML: at 10:29

## 2024-05-20 RX ADMIN — NOREPINEPHRINE BITARTRATE 0.06 MCG/KG/MIN: 0.03 INJECTION, SOLUTION INTRAVENOUS at 06:28

## 2024-05-20 RX ADMIN — SUCRALFATE 1 G: 1 TABLET ORAL at 18:24

## 2024-05-20 RX ADMIN — SODIUM CHLORIDE 75 ML/HR: 9 INJECTION, SOLUTION INTRAVENOUS at 13:01

## 2024-05-20 RX ADMIN — Medication 10 ML: at 21:20

## 2024-05-20 RX ADMIN — SUCRALFATE 1 G: 1 TABLET ORAL at 10:28

## 2024-05-20 RX ADMIN — SUCRALFATE 1 G: 1 TABLET ORAL at 13:02

## 2024-05-20 RX ADMIN — LEVETIRACETAM 500 MG: 5 INJECTION, SOLUTION INTRAVENOUS at 05:11

## 2024-05-20 RX ADMIN — SENNOSIDES AND DOCUSATE SODIUM 2 TABLET: 50; 8.6 TABLET ORAL at 10:29

## 2024-05-20 RX ADMIN — CARVEDILOL 6.25 MG: 6.25 TABLET, FILM COATED ORAL at 18:22

## 2024-05-20 RX ADMIN — SUCRALFATE 1 G: 1 TABLET ORAL at 21:20

## 2024-05-20 RX ADMIN — LACTULOSE 30 G: 20 SOLUTION ORAL at 21:19

## 2024-05-20 RX ADMIN — Medication 75 MCG/HR: at 04:53

## 2024-05-20 RX ADMIN — LACTULOSE 30 G: 20 SOLUTION ORAL at 18:22

## 2024-05-20 RX ADMIN — LEVETIRACETAM 500 MG: 5 INJECTION, SOLUTION INTRAVENOUS at 13:01

## 2024-05-20 RX ADMIN — ENOXAPARIN SODIUM 40 MG: 100 INJECTION SUBCUTANEOUS at 23:30

## 2024-05-20 RX ADMIN — LEVETIRACETAM 500 MG: 5 INJECTION, SOLUTION INTRAVENOUS at 23:30

## 2024-05-20 NOTE — PLAN OF CARE
Goal Outcome Evaluation:   PT HAS RESTED WELL ON PS 10/5 24% MY ENTIRE SHIFT WITH NO ISSUES.

## 2024-05-20 NOTE — PROGRESS NOTES
Nutrition Services    Patient Name: Nic Nicolas  YOB: 1965  MRN: [id number]  Admission date: 5/13/2024    PROGRESS NOTE      Encounter Information: EN Follow Up       PO Diet: NPO Diet NPO Type: Tube Feeding   PO Supplements: NPO   PO Intake:  NPO       Current nutrition support: Peptamen Intense VHP @ 35 ml/hr x 22 hr  FWF 70 ml q2h (840 ml)  Provides: 770 kcal, 71 g pro, 647 ml (1487 ml H20)    **propofol stopped this AM       Estimated Needs: 6041-8670 kcal, 136 g pro, 1700 ml        Nutrition support review: TF to start this AM per primary RN       Labs (reviewed below): Phos 4.6       GI Function:  Per primary RN, no BM yet. Pt is receiving lactulose, frequency increased today.        Nutrition Intervention Updates: Begin TF. RD to monitor per protocol.      Results from last 7 days   Lab Units 05/20/24 0442 05/19/24 0431 05/18/24 2046 05/18/24 0459 05/17/24  0451   SODIUM mmol/L 136 135* 136   < > 135*   SODIUM, ARTERIAL   --   --   --    < >  --    POTASSIUM mmol/L 3.8 3.6 3.8   < > 3.7   CHLORIDE mmol/L 101 100 100   < > 102   CO2 mmol/L 21.9* 22.1 21.8*   < > 21.4*   BUN mg/dL 39* 30* 27*   < > 24*   CREATININE mg/dL 1.68* 1.36* 1.33*   < > 1.32*   CALCIUM mg/dL 8.6 8.8 8.8   < > 8.8   BILIRUBIN mg/dL 2.1* 1.2  --   --  1.4*   ALK PHOS U/L 73 71  --   --  76   ALT (SGPT) U/L 19 18  --   --  18   AST (SGOT) U/L 38 35  --   --  36   GLUCOSE mg/dL 177* 130* 175*   < > 170*   GLUCOSE, ARTERIAL   --   --   --    < >  --     < > = values in this interval not displayed.     Results from last 7 days   Lab Units 05/20/24 0442 05/19/24  0431 05/15/24  0506 05/14/24  0507   MAGNESIUM mg/dL 2.2 1.8  --  1.8   PHOSPHORUS mg/dL 4.6* 4.7*   < >  --    HEMOGLOBIN g/dL 11.0* 10.2*   < > 9.6*   HEMATOCRIT % 33.5* 31.3*   < > 30.0*    < > = values in this interval not displayed.     COVID19   Date Value Ref Range Status   05/18/2024 Not Detected Not Detected - Ref. Range Final     Lab Results    Component Value Date    HGBA1C 7.10 (H) 01/31/2024       RD to follow up per protocol.    Electronically signed by:  Shraddha Amos RD  05/20/24 10:55 EDT

## 2024-05-20 NOTE — PROGRESS NOTES
RT EQUIPMENT DEVICE RELATED - SKIN ASSESSMENT    RT Medical Equipment/Device:     ETT House/[employer]    Skin Assessment:      Cheek:  Intact  Neck:  Intact  Nose:  Intact  Mouth:  Intact    Device Skin Pressure Protection:  Pressure points protected    Nurse Notification:  Araseli Gastelum, RRT

## 2024-05-20 NOTE — PLAN OF CARE
Goal Outcome Evaluation:  Plan of Care Reviewed With: patient, family        Progress: improving     Pt withdraws from pain. Levo 0.08 prop 20 fentanyl 75mcg/hr. Sats 96% on vent pressure support fio2 24%. Cm sr

## 2024-05-20 NOTE — PLAN OF CARE
Goal Outcome Evaluation:      Patient has maintained on PS 10/5 24% today.  NIF -24 RSBI 40  Patient self extubated @ 1440.  Patient is on room air at this time.  Sats 94%

## 2024-05-20 NOTE — PROGRESS NOTES
[facility]   Hospitalist Progress Note  Date: 2024  Patient Name: Nic Nicolas  : 1965  MRN: [id number]  Date of admission: 2024      Subjective   Subjective     Chief Complaint: Follow up for confusion    Summary: 58 y/o M with cirrhosis of the liver due to ROMO can have a hepatic encephalopathy, DVT in the past, diabetes on insulin, hypertension, obstructive sleep apnea, TBI, and asthma/COPD who presents with confusion. VSS. CBC shows no leukocytosis but does show a hemoglobin of 10.7 which is above his baseline.  CMP shows a slightly elevated bilirubin at 2.4.  Ammonia level is 96.  UA shows moderate blood, positive nitrite, small leuk esterase trace bacteria.  CT of the head shows global/frontal lobe atrophy.  Patient was given 2 g of ceftriaxone and started on lactulose q4 hours.  Gradual improvement in mentation.  Ammonia normalized.  No ascites on ultrasound.  Completed Rocephin.  Had CODE BLUE called after getting MRI of the brain where he became cyanotic with agonal breathing but never lost a pulse.  Had multiple seizures and had to be intubated for airway protection    Interval Followup:   Propofol/fentanyl drips weaned off  On low-dose of Levophed  On minimal ventilator settings  Starting to wake up, really opens eyes to voice    Review of Systems  UTO    Objective   Objective     Vitals:   Temp:  [98.1 °F (36.7 °C)-99.1 °F (37.3 °C)] 98.2 °F (36.8 °C)  Heart Rate:  [70-84] 77  Resp:  [10] 10  BP: ()/(40-69) 114/52  FiO2 (%):  [24 %] 24 %  Physical Exam    Constitutional: Morbidly obese male, on ventilator   HEENT: NCAT, OP with ET tube   Respiratory: Equal vent sounds bilaterally   Cardiovascular: RRR, trace pedal edema   Gastrointestinal: Distended but soft, bowel sounds present throughout   Neurologic: Grimaces/tries to open eyes with sternal rub   Skin: Extremities warm    Result Review    Result Review:  I have personally reviewed the following over the last 24 hours  (07:00 to 07:00) and agree with the following findings  [x]  Laboratory  CBC          5/17/2024    04:51 5/19/2024    04:31 5/20/2024    04:42   CBC   WBC 4.90  4.39  9.11    RBC 3.54  3.59  3.88    Hemoglobin 9.9  10.2  11.0    Hematocrit 30.5  31.3  33.5    MCV 86.2  87.2  86.3    MCH 28.0  28.4  28.4    MCHC 32.5  32.6  32.8    RDW 18.6  18.5  18.9    Platelets 65  66  95      CMP          5/18/2024    04:59 5/18/2024    18:19 5/18/2024    20:10 5/18/2024    20:46 5/19/2024    04:31 5/20/2024    04:42   CMP   Glucose 118  156  166  175  130  177    BUN 25    27  30  39    Creatinine 1.03    1.33  1.36  1.68    EGFR 84.2    62.0  60.3  46.8    Sodium 138  136.2  133.9  136  135  136    Potassium 3.8    3.8  3.6  3.8    Chloride 103    100  100  101    Calcium 9.0    8.8  8.8  8.6    Total Protein     5.8  6.2    Albumin     3.0  3.2    Globulin     2.8  3.0    Total Bilirubin     1.2  2.1    Alkaline Phosphatase     71  73    AST (SGOT)     35  38    ALT (SGPT)     18  19    Albumin/Globulin Ratio     1.1  1.1    BUN/Creatinine Ratio 24.3    20.3  22.1  23.2    Anion Gap 12.8    14.2  12.9  13.1      [x]  Microbiology  Blood culture NGTD  Urine culture >100,000 CFU/mL Normal Urogenital Rachel   []  Radiology  []  EKG/Telemetry   []  Cardiology/Vascular   []  Pathology  []  Old records  []  Other:    Assessment & Plan   Assessment / Plan     Assessment/Plan:  Seizure  Hyperammonemia  Hepatic encephalopathy  Fever/abdominal distention; rule out SBP  ROMO cirrhosis  Chronic thrombocytopenia  Chronic anemia  Frontal lobe atrophy  History of GI bleed secondary to peptic ulcer  History of GERD without esophagitis  Essential hypertension  Insulin-dependent type 2 diabetes  Anxiety and depression  Morbid obesity BMI 43         Continue ICU level care.  Appreciate intensive assistance  Off propofol/fentanyl drip this morning.  Started to wake up.  Daily sedation vacation  On minimal ventilator settings.  Vent management per  intensivist  On low-dose of Levophed, wean to maintain MAP >65  Still no BM.  Continue Keke-Colace twice daily.  Continue lactulose; increased to 30 g every 6 hours  Continue rifaximin 550 mg every 12 hours  MRI of the brain that was obtained prior to seizure was unremarkable for acute pathology.  Neurology following.  EEG pending. Continue IV Keppra 500 mg every 6 hour  No further fevers, white count remains normal and previous microbiology was negative. Not enough ascites seen on ultrasound for paracentesis. Completed empiric course of Rocephin.  Repeat blood cultures pending  Platelets 95,000. No bleeding events.  Trend and transfuse for platelets less than 10k or less than 20k with bleeding  Hemoglobin 10; monitor and transfuse for hemoglobin less than 7  Continue IV Protonix 40 mg daily  Creatinine up to 1.68.  Hold Lasix today. IV fluids started. Trend renal function electrolytes with daily BMP.  Monitor closely for renal toxicity  Blood sugars controlled.  Continue low-dose SSI per protocol. POC glucose every 6 hours.  Continue Coreg 6.25 mg with holding parameter  Continue BuSpar 7.5 mg three times daily.   Continue Zoloft 100 mg nightly  Low-sodium/diabetic diet  VTE ppx: Lovenox  CBC, CMP in a.m.  GOC: Per patient's daughter/sister they confirm that patient would not want CPR and would not want reintubated if he is extubated.  Change made in system      Discussed with Dr Oscar  Discussed with SW/CM.  Has bed hold at [place].     DVT prophylaxis:  Medical and mechanical DVT prophylaxis orders are present.    59-year-old male with acute seizure, decompensated cirrhosis, hyperammonemia  35 minutes of critical care provided including direct patient contact, review of chart, labs, pertinent imaging, high complexity decision making and discussion with physicians, staff, family      CODE STATUS:   Medical Intervention Limits: NO intubation (DNI)  Level Of Support Discussed With: Next of Kin (If No  Surrogate)  Code Status (Patient has no pulse and is not breathing): No CPR (Do Not Attempt to Resuscitate)  Medical Interventions (Patient has pulse or is breathing): Limited Support    Electronically signed by Ron Strong DO, 05/20/24, 1:32 PM EDT.

## 2024-05-20 NOTE — PROGRESS NOTES
Pulmonary / Critical Care Progress Note      Patient Name: Nic Nicolas  : 1965  MRN: [id number]  Attending:  Ron Strong DO   Date of admission: 2024    Subjective   Subjective   Patient critically ill with seizures, hepatic encephalopathy    Over past 24 hours:  Continued on vent with sedation  Received multiple doses of lactulose without BM noted      Today  Received bowel regimen as well as lactulose  No BM reported  Sedation on hold this a.m.  Tolerating SBT    Objective   Objective     Vitals:   Vital signs for last 24 hours:  Temp:  [98.1 °F (36.7 °C)-99.1 °F (37.3 °C)] 99 °F (37.2 °C)  Heart Rate:  [70-83] 83  Resp:  [10] 10  BP: ()/(40-68) 87/44  FiO2 (%):  [24 %] 24 %    Intake/Output last 3 shifts:  I/O last 3 completed shifts:  In:  [I.V.:; Other:30]  Out: 800 [Urine:800]  Intake/Output this shift:  No intake/output data recorded.    Vent settings for last 24 hours:  Mode: PS  FiO2 (%):  [24 %] 24 %  S RR:  [20] 20  S VT:  [450 mL] 450 mL  PEEP/CPAP (cm H2O):  [5 cm H20] 5 cm H20  SD SUP:  [10 cm H20] 10 cm H20  MAP (cm H2O):  [7.4-8.9] 7.4    Hemodynamic parameters for last 24 hours:       Physical Exam   Vital Signs Reviewed  WDWN, sedated and intubated on mechanical ventilation  HEENT: AT/NC: OP with a ET tube in place  Chest:  coarse ventilator breath sounds heard, clear to auscultation bilaterally, no work of breathing noted  CV: RRR, no MGR, pulses 2+, equal.  EXT:  no clubbing, no cyanosis, no edema, no joint tenderness  Neuro: Following some commands, debated on vent, no obvious focal deficits noted   Skin: No rashes or lesions noted          Result Review    Result Review:  I have personally reviewed the results from the time of this admission to 2024 09:22 EDT and agree with these findings:  []  Laboratory  []  Microbiology  []  Radiology  []  EKG/Telemetry   []  Cardiology/Vascular   []  Pathology  []  Old records  []  Other:  Most notable findings  include:       Lab 05/20/24 0442 05/19/24  0431 05/18/24 2046 05/18/24 2010 05/18/24  1819 05/18/24 0459 05/17/24 0451 05/16/24  0503 05/15/24  0506 05/14/24  0507 05/13/24  1423   WBC 9.11 4.39  --   --   --   --  4.90 3.88 4.27 3.80 5.16   HEMOGLOBIN 11.0* 10.2*  --   --   --   --  9.9* 9.9* 10.1* 9.6* 10.7*   HEMATOCRIT 33.5* 31.3*  --   --   --   --  30.5* 30.4* 31.9* 30.0* 33.4*   PLATELETS 95* 66*  --   --   --   --  65* 70* 69* 67* 92*   SODIUM 136 135* 136  --   --  138 135* 140 141 141 139   SODIUM, ARTERIAL  --   --   --  133.9* 136.2  --   --   --   --   --   --    POTASSIUM 3.8 3.6 3.8  --   --  3.8 3.7 3.4* 4.0 3.6 4.0   CHLORIDE 101 100 100  --   --  103 102 106 105 108* 104   CO2 21.9* 22.1 21.8*  --   --  22.2 21.4* 22.9 22.4 21.7* 22.4   BUN 39* 30* 27*  --   --  25* 24* 21* 19 17 19   CREATININE 1.68* 1.36* 1.33*  --   --  1.03 1.32* 1.09 0.86 0.85 0.86   GLUCOSE 177* 130* 175*  --   --  118* 170* 135* 142* 135* 143*   GLUCOSE, ARTERIAL  --   --   --  166* 156*  --   --   --   --   --   --    CALCIUM 8.6 8.8 8.8  --   --  9.0 8.8 9.1 9.1 8.8 8.9   PHOSPHORUS 4.6* 4.7* 4.3  --   --   --   --   --   --   --   --    TOTAL PROTEIN 6.2 5.8*  --   --   --   --  5.9* 5.8* 6.1 5.6* 6.3   ALBUMIN 3.2* 3.0*  --   --   --   --  3.0* 2.9* 3.2* 2.9* 3.3*   GLOBULIN 3.0 2.8  --   --   --   --  2.9 2.9 2.9 2.7 3.0         Assessment & Plan   Assessment / Plan     Active Hospital Problems:  Active Hospital Problems    Diagnosis     **Hepatic encephalopathy    Seizures  Intubated and placed on ventilator for airway protection  Deep sedation secondary to persistent seizure activity  Hyperammonemia  Hepatic encephalopathy  Decompensated cirrhosis  Hypokalemia  Hyponatremia, clinically insignificant  Acute kidney injury secondary to prerenal etiology     Plan:   Obtain 2D echo  Start NS IVF at 75 ML per hour  Hold diuretics.  Creatinine trending up  Repeat blood cultures x 2.  Initial blood cultures with 1 out of  2 gram-positive cocci not Staph aureus  Increase lactulose 30 g 4 times daily  Continue rifaximin  Okay to utilize FMS if needed  Currently propofol and fentanyl on hold  Continue on AC settings  Daily SBT  Neurology on board  Continue Keppra  Obtain EEG today  OG tube in place.  Dietitian on board.  Tube feeds and free water recommendations per them  Trend H&H.  Recommend transfusion for Hgb 7 or less.  Platelet count 95.  No active bleeding noted  Continue Coreg scheduled with parameters  Trend renal function electrolytes.  Replace potassium orally    DVT prophylaxis:  Medical and mechanical DVT prophylaxis orders are present.    CODE STATUS:   Level Of Support Discussed With: Patient  Code Status (Patient has no pulse and is not breathing): CPR (Attempt to Resuscitate)  Medical Interventions (Patient has pulse or is breathing): Full Support      Patient is critically ill with witnessed in hospital seizures, intubated and placed on ventilator for airway protection, hyperammonemia, hepatic encephalopathy. . We have personally reviewed all pertinent labs, imaging, microbiology and documentation. We have discussed care with the primary service as well as at multidisciplinary critical care rounds with the bedside nurse, respiratory therapist, pharmacist and all other ancillary services. 35 minutes of critical care time was spent managing this patient, excluding procedures. Of this time, I spent 20 minutes in accordance with split shared billing.    I, SOLEDAD Martino spent 15 minutes in accordance with split shared billing.  Electronically signed by SOLEDAD Rapp, 05/20/24, 11:18 AM EDT.  Electronically signed by Star Oscar MD, 05/20/24, 2:50 PM EDT.

## 2024-05-20 NOTE — NURSING NOTE
"RN reported to room immediately upon ventilator alarming and saw that pt had self-extubated at 1440. Respiratory therapy called. Initially O2 sats dropped to 82, but quickly recovered to the mid 90s. Upon removal of the tube from his mouth the pt stated, \"Thank God.\" O2 currently 93% on RA. Sushma Ricci PA-C aware. PD RN   "

## 2024-05-21 LAB
ALBUMIN SERPL-MCNC: 2.8 G/DL (ref 3.5–5.2)
ALBUMIN/GLOB SERPL: 1 G/DL
ALP SERPL-CCNC: 67 U/L (ref 39–117)
ALT SERPL W P-5'-P-CCNC: 18 U/L (ref 1–41)
ANA SER QL: NEGATIVE
ANION GAP SERPL CALCULATED.3IONS-SCNC: 8 MMOL/L (ref 5–15)
AST SERPL-CCNC: 36 U/L (ref 1–40)
BILIRUB SERPL-MCNC: 1.3 MG/DL (ref 0–1.2)
BUN SERPL-MCNC: 45 MG/DL (ref 6–20)
BUN/CREAT SERPL: 36.9 (ref 7–25)
CALCIUM SPEC-SCNC: 8.2 MG/DL (ref 8.6–10.5)
CHLORIDE SERPL-SCNC: 104 MMOL/L (ref 98–107)
CO2 SERPL-SCNC: 23 MMOL/L (ref 22–29)
CREAT SERPL-MCNC: 1.22 MG/DL (ref 0.76–1.27)
DEPRECATED RDW RBC AUTO: 59.8 FL (ref 37–54)
EGFRCR SERPLBLD CKD-EPI 2021: 68.7 ML/MIN/1.73
ERYTHROCYTE [DISTWIDTH] IN BLOOD BY AUTOMATED COUNT: 18.6 % (ref 12.3–15.4)
GLOBULIN UR ELPH-MCNC: 2.9 GM/DL
GLUCOSE BLDC GLUCOMTR-MCNC: 147 MG/DL (ref 70–99)
GLUCOSE BLDC GLUCOMTR-MCNC: 162 MG/DL (ref 70–99)
GLUCOSE BLDC GLUCOMTR-MCNC: 173 MG/DL (ref 70–99)
GLUCOSE BLDC GLUCOMTR-MCNC: 182 MG/DL (ref 70–99)
GLUCOSE BLDC GLUCOMTR-MCNC: 196 MG/DL (ref 70–99)
GLUCOSE SERPL-MCNC: 200 MG/DL (ref 65–99)
HCT VFR BLD AUTO: 28.6 % (ref 37.5–51)
HGB BLD-MCNC: 9.3 G/DL (ref 13–17.7)
IRON 24H UR-MRATE: 35 MCG/DL (ref 59–158)
IRON SATN MFR SERPL: 11 % (ref 20–50)
MAGNESIUM SERPL-MCNC: 2.2 MG/DL (ref 1.6–2.6)
MCH RBC QN AUTO: 28.4 PG (ref 26.6–33)
MCHC RBC AUTO-ENTMCNC: 32.5 G/DL (ref 31.5–35.7)
MCV RBC AUTO: 87.5 FL (ref 79–97)
PHOSPHATE SERPL-MCNC: 2.7 MG/DL (ref 2.5–4.5)
PLATELET # BLD AUTO: 46 10*3/MM3 (ref 140–450)
PMV BLD AUTO: 10.8 FL (ref 6–12)
POTASSIUM SERPL-SCNC: 3.9 MMOL/L (ref 3.5–5.2)
PROT SERPL-MCNC: 5.7 G/DL (ref 6–8.5)
RBC # BLD AUTO: 3.27 10*6/MM3 (ref 4.14–5.8)
SARS-COV-2 AB SERPL QL IA: POSITIVE
SODIUM SERPL-SCNC: 135 MMOL/L (ref 136–145)
TIBC SERPL-MCNC: 326 MCG/DL (ref 298–536)
TRANSFERRIN SERPL-MCNC: 219 MG/DL (ref 200–360)
WBC NRBC COR # BLD AUTO: 4.97 10*3/MM3 (ref 3.4–10.8)

## 2024-05-21 PROCEDURE — 82948 REAGENT STRIP/BLOOD GLUCOSE: CPT

## 2024-05-21 PROCEDURE — 99232 SBSQ HOSP IP/OBS MODERATE 35: CPT | Performed by: STUDENT IN AN ORGANIZED HEALTH CARE EDUCATION/TRAINING PROGRAM

## 2024-05-21 PROCEDURE — 84100 ASSAY OF PHOSPHORUS: CPT | Performed by: PHYSICIAN ASSISTANT

## 2024-05-21 PROCEDURE — 80053 COMPREHEN METABOLIC PANEL: CPT | Performed by: PHYSICIAN ASSISTANT

## 2024-05-21 PROCEDURE — 83540 ASSAY OF IRON: CPT | Performed by: STUDENT IN AN ORGANIZED HEALTH CARE EDUCATION/TRAINING PROGRAM

## 2024-05-21 PROCEDURE — 82948 REAGENT STRIP/BLOOD GLUCOSE: CPT | Performed by: INTERNAL MEDICINE

## 2024-05-21 PROCEDURE — 99291 CRITICAL CARE FIRST HOUR: CPT | Performed by: INTERNAL MEDICINE

## 2024-05-21 PROCEDURE — 25010000002 THIAMINE PER 100 MG: Performed by: PSYCHIATRY & NEUROLOGY

## 2024-05-21 PROCEDURE — 84466 ASSAY OF TRANSFERRIN: CPT | Performed by: STUDENT IN AN ORGANIZED HEALTH CARE EDUCATION/TRAINING PROGRAM

## 2024-05-21 PROCEDURE — 83735 ASSAY OF MAGNESIUM: CPT | Performed by: PHYSICIAN ASSISTANT

## 2024-05-21 PROCEDURE — 85027 COMPLETE CBC AUTOMATED: CPT | Performed by: PHYSICIAN ASSISTANT

## 2024-05-21 PROCEDURE — 63710000001 INSULIN LISPRO (HUMAN) PER 5 UNITS: Performed by: INTERNAL MEDICINE

## 2024-05-21 PROCEDURE — 25010000002 LEVETIRACETAM IN NACL 0.82% 500 MG/100ML SOLUTION: Performed by: PSYCHIATRY & NEUROLOGY

## 2024-05-21 PROCEDURE — 25010000002 THIAMINE HCL 200 MG/2ML SOLUTION: Performed by: PSYCHIATRY & NEUROLOGY

## 2024-05-21 RX ORDER — LORAZEPAM 2 MG/ML
1 INJECTION INTRAMUSCULAR ONCE
Status: COMPLETED | OUTPATIENT
Start: 2024-05-22 | End: 2024-05-22

## 2024-05-21 RX ORDER — POTASSIUM CHLORIDE 1.5 G/1.58G
40 POWDER, FOR SOLUTION ORAL ONCE
Status: COMPLETED | OUTPATIENT
Start: 2024-05-21 | End: 2024-05-21

## 2024-05-21 RX ADMIN — LEVETIRACETAM 500 MG: 5 INJECTION, SOLUTION INTRAVENOUS at 19:27

## 2024-05-21 RX ADMIN — Medication 1000 UNITS: at 10:41

## 2024-05-21 RX ADMIN — THIAMINE HYDROCHLORIDE 100 MG: 100 INJECTION, SOLUTION INTRAMUSCULAR; INTRAVENOUS at 19:26

## 2024-05-21 RX ADMIN — SUCRALFATE 1 G: 1 TABLET ORAL at 16:46

## 2024-05-21 RX ADMIN — POTASSIUM CHLORIDE 40 MEQ: 1.5 POWDER, FOR SOLUTION ORAL at 10:41

## 2024-05-21 RX ADMIN — INSULIN LISPRO 2 UNITS: 100 INJECTION, SOLUTION INTRAVENOUS; SUBCUTANEOUS at 16:46

## 2024-05-21 RX ADMIN — SUCRALFATE 1 G: 1 TABLET ORAL at 10:41

## 2024-05-21 RX ADMIN — INSULIN LISPRO 2 UNITS: 100 INJECTION, SOLUTION INTRAVENOUS; SUBCUTANEOUS at 13:51

## 2024-05-21 RX ADMIN — SERTRALINE HYDROCHLORIDE 100 MG: 100 TABLET, FILM COATED ORAL at 21:23

## 2024-05-21 RX ADMIN — LACTULOSE 30 G: 20 SOLUTION ORAL at 21:23

## 2024-05-21 RX ADMIN — CARVEDILOL 6.25 MG: 6.25 TABLET, FILM COATED ORAL at 10:41

## 2024-05-21 RX ADMIN — Medication 10 ML: at 10:42

## 2024-05-21 RX ADMIN — HYDROCORTISONE 1 APPLICATION: 1 OINTMENT TOPICAL at 21:23

## 2024-05-21 RX ADMIN — THIAMINE HYDROCHLORIDE 100 MG: 100 INJECTION, SOLUTION INTRAMUSCULAR; INTRAVENOUS at 05:10

## 2024-05-21 RX ADMIN — BUSPIRONE HYDROCHLORIDE 7.5 MG: 5 TABLET ORAL at 21:22

## 2024-05-21 RX ADMIN — BUSPIRONE HYDROCHLORIDE 7.5 MG: 5 TABLET ORAL at 10:41

## 2024-05-21 RX ADMIN — SENNOSIDES AND DOCUSATE SODIUM 2 TABLET: 50; 8.6 TABLET ORAL at 10:41

## 2024-05-21 RX ADMIN — SUCRALFATE 1 G: 1 TABLET ORAL at 13:47

## 2024-05-21 RX ADMIN — THIAMINE HYDROCHLORIDE 100 MG: 100 INJECTION, SOLUTION INTRAMUSCULAR; INTRAVENOUS at 13:47

## 2024-05-21 RX ADMIN — LACTULOSE 30 G: 20 SOLUTION ORAL at 10:41

## 2024-05-21 RX ADMIN — LACTULOSE 30 G: 20 SOLUTION ORAL at 05:11

## 2024-05-21 RX ADMIN — PANTOPRAZOLE SODIUM 40 MG: 40 INJECTION, POWDER, FOR SOLUTION INTRAVENOUS at 05:10

## 2024-05-21 RX ADMIN — RIFAXIMIN 550 MG: 550 TABLET ORAL at 21:23

## 2024-05-21 RX ADMIN — BUSPIRONE HYDROCHLORIDE 7.5 MG: 5 TABLET ORAL at 16:46

## 2024-05-21 RX ADMIN — LACTULOSE 30 G: 20 SOLUTION ORAL at 16:46

## 2024-05-21 RX ADMIN — CHLORHEXIDINE GLUCONATE 15 ML: 1.2 RINSE ORAL at 10:41

## 2024-05-21 RX ADMIN — LEVETIRACETAM 500 MG: 5 INJECTION, SOLUTION INTRAVENOUS at 05:10

## 2024-05-21 RX ADMIN — Medication 10 ML: at 21:24

## 2024-05-21 RX ADMIN — SUCRALFATE 1 G: 1 TABLET ORAL at 21:23

## 2024-05-21 RX ADMIN — CARVEDILOL 6.25 MG: 6.25 TABLET, FILM COATED ORAL at 19:27

## 2024-05-21 RX ADMIN — LEVETIRACETAM 500 MG: 5 INJECTION, SOLUTION INTRAVENOUS at 13:47

## 2024-05-21 RX ADMIN — RIFAXIMIN 550 MG: 550 TABLET ORAL at 10:41
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
## 2024-05-21 NOTE — PROGRESS NOTES
[facility]   Hospitalist Progress Note  Date: 2024  Patient Name: Nic Nicolas  : 1965  MRN: [id number]  Date of admission: 2024  [place] [facility] [place]      Subjective   Subjective     Chief Complaint: Follow up for confusion     Summary:Nic Nicolas is a 59 y.o. male th cirrhosis of the liver due to ROMO can have a hepatic encephalopathy, DVT in the past, diabetes on insulin, hypertension, obstructive sleep apnea, TBI, and asthma/COPD who presents with confusion. VSS. CBC shows no leukocytosis but does show a hemoglobin of 10.7 which is above his baseline.  CMP shows a slightly elevated bilirubin at 2.4.  Ammonia level is 96.  UA shows moderate blood, positive nitrite, small leuk esterase trace bacteria.  CT of the head shows global/frontal lobe atrophy.  Patient was given 2 g of ceftriaxone and started on lactulose q4 hours.  Gradual improvement in mentation.  Ammonia normalized.  No ascites on ultrasound.  Completed Rocephin.  Had CODE BLUE called after getting MRI of the brain where he became cyanotic with agonal breathing but never lost a pulse.  Had multiple seizures and had to be intubated for airway protection.  On Keppra for seizures.  Patient was started on lactulose.  Was on propofol/fentanyl drip for sedation due to seizure activities.  Patient self extubated on .  Saturating in room air thereafter.  Also on Levophed for pressor support.    Interval Followup: No acute event overnight.  Off Levophed, blood pressure stable.  His fentanyl and propofol were discontinued yesterday.  Saturating well on room air.    Had multiple bowel movements.    Review of Systems    All systems reviewed and negative except for what is outlined above.      Objective   Objective     Vitals:   Temp:  [97.3 °F (36.3 °C)-99 °F (37.2 °C)] 97.7 °F (36.5 °C)  Heart Rate:  [67-84] 71  BP: ()/(36-92) 116/54  FiO2 (%):  [24 %] 24 %    Physical Exam   General: Awake but lethargic, doesn't follow  commands. cardiovascular: RRR, no murmurs   Pulmonary: CTA bilaterally; no wheezes; no conversational dyspnea  Gastrointestinal: S/ND/NT, +BS  Neuro: Awake, open eyes to verbal stimuli.  Confused.  Orientation cannot be assessed.      Result Review    Result Review:  I have personally reviewed these results:  [x]  Laboratory      Lab 05/21/24 0518 05/20/24 0442 05/19/24 0431 05/19/24  0000 05/18/24 2046 05/18/24 2010 05/17/24 0451 05/16/24  0503   WBC 4.97 9.11 4.39  --   --   --    < > 3.88   HEMOGLOBIN 9.3* 11.0* 10.2*  --   --   --    < > 9.9*   HEMATOCRIT 28.6* 33.5* 31.3*  --   --   --    < > 30.4*   PLATELETS 46* 95* 66*  --   --   --    < > 70*   MCV 87.5 86.3 87.2  --   --   --    < > 85.6   CRP  --   --   --   --  <0.30  --   --   --    LACTATE  --   --   --  1.5 3.2*  --   --   --    LACTATE, ARTERIAL  --   --   --   --   --  3.62*   < >  --    APTT  --   --   --   --   --   --   --  36.3*   D DIMER QUANT  --   --   --   --  1.22*  --   --   --     < > = values in this interval not displayed.         Lab 05/21/24 0518 05/20/24 0442 05/19/24 0431 05/18/24 2046 05/18/24 2010 05/18/24  1819   SODIUM 135* 136 135*   < >  --   --    SODIUM, ARTERIAL  --   --   --   --  133.9* 136.2   POTASSIUM 3.9 3.8 3.6   < >  --   --    CHLORIDE 104 101 100   < >  --   --    CO2 23.0 21.9* 22.1   < >  --   --    ANION GAP 8.0 13.1 12.9   < >  --   --    BUN 45* 39* 30*   < >  --   --    CREATININE 1.22 1.68* 1.36*   < >  --   --    EGFR 68.7 46.8* 60.3   < >  --   --    GLUCOSE 200* 177* 130*   < >  --   --    GLUCOSE, ARTERIAL  --   --   --   --  166* 156*   CALCIUM 8.2* 8.6 8.8   < >  --   --    IONIZED CALCIUM  --   --   --   --  1.12* 1.15   MAGNESIUM 2.2 2.2 1.8  --   --   --    PHOSPHORUS 2.7 4.6* 4.7*   < >  --   --     < > = values in this interval not displayed.         Lab 05/21/24  0518 05/20/24  0442 05/19/24  0431   TOTAL PROTEIN 5.7* 6.2 5.8*   ALBUMIN 2.8* 3.2* 3.0*   GLOBULIN 2.9 3.0 2.8   ALT  (SGPT) 18 19 18   AST (SGOT) 36 38 35   BILIRUBIN 1.3* 2.1* 1.2   ALK PHOS 67 73 71                     Lab 05/18/24 2010 05/18/24  1819   PH, ARTERIAL 7.268* 7.243*   PCO2, ARTERIAL 45.7* 35.9   PO2 .0* 204.7*   O2 SATURATION ART 97.6 98.9   FIO2 40 100   HCO3 ART 20.4* 15.1*   BASE EXCESS ART -6.4* -11.3*   CARBOXYHEMOGLOBIN 0.2 0.5     Brief Urine Lab Results  (Last result in the past 365 days)        Color   Clarity   Blood   Leuk Est   Nitrite   Protein   CREAT   Urine HCG        05/18/24 2110 Yellow   Clear   Negative   Negative   Negative   Negative                 [x]  Microbiology   Microbiology Results (last 10 days)       Procedure Component Value - Date/Time    Respiratory Culture - Sputum, ET Suction [[id number]] Collected: 05/18/24 2126    Lab Status: Final result Specimen: Sputum from ET Suction Updated: 05/20/24 1056     Respiratory Culture Scant growth (1+) Normal respiratory marion. No S. aureus or Pseudomonas aeruginosa detected. Final report.     Gram Stain Moderate (3+) WBCs seen      Moderate (3+) Epithelial cells seen      Few (2+) Gram positive cocci in pairs, chains and clusters    MRSA Screen, PCR (Inpatient) - Swab, Nares [[id number]]  (Normal) Collected: 05/18/24 2112    Lab Status: Final result Specimen: Swab from Nares Updated: 05/18/24 2235     MRSA PCR No MRSA Detected    Narrative:      The negative predictive value of this diagnostic test is high and should only be used to consider de-escalating anti-MRSA therapy. A positive result may indicate colonization with MRSA and must be correlated clinically.    Respiratory Panel PCR w/COVID-19(SARS-CoV-2) BRENDAN/LE/CATRACHITA/PAD/COR/ALFREDO In-House, NP Swab in UTM/VTM, 2 HR TAT - Swab, Nasopharynx [[id number]]  (Normal) Collected: 05/18/24 2112    Lab Status: Final result Specimen: Swab from Nasopharynx Updated: 05/19/24 0655     ADENOVIRUS, PCR Not Detected     Coronavirus 229E Not Detected     Coronavirus HKU1 Not Detected     Coronavirus NL63  Not Detected     Coronavirus OC43 Not Detected     COVID19 Not Detected     Human Metapneumovirus Not Detected     Human Rhinovirus/Enterovirus Not Detected     Influenza A PCR Not Detected     Influenza B PCR Not Detected     Parainfluenza Virus 1 Not Detected     Parainfluenza Virus 2 Not Detected     Parainfluenza Virus 3 Not Detected     Parainfluenza Virus 4 Not Detected     RSV, PCR Not Detected     Bordetella pertussis pcr Not Detected     Bordetella parapertussis PCR Not Detected     Chlamydophila pneumoniae PCR Not Detected     Mycoplasma pneumo by PCR Not Detected    Narrative:      In the setting of a positive respiratory panel with a viral infection PLUS a negative procalcitonin without other underlying concern for bacterial infection, consider observing off antibiotics or discontinuation of antibiotics and continue supportive care. If the respiratory panel is positive for atypical bacterial infection (Bordetella pertussis, Chlamydophila pneumoniae, or Mycoplasma pneumoniae), consider antibiotic de-escalation to target atypical bacterial infection.    S. Pneumo Ag Urine or CSF - Urine, Urine, Clean Catch [[id number]]  (Normal) Collected: 05/18/24 2110    Lab Status: Final result Specimen: Urine, Clean Catch Updated: 05/18/24 2151     Strep Pneumo Ag Negative    Legionella Antigen, Urine - Urine, Urine, Clean Catch [[id number]]  (Normal) Collected: 05/18/24 2110    Lab Status: Final result Specimen: Urine, Clean Catch Updated: 05/18/24 2151     LEGIONELLA ANTIGEN, URINE Negative    Blood Culture - Blood, Arm, Left [[id number]]  (Abnormal) Collected: 05/18/24 2046    Lab Status: Final result Specimen: Blood from Arm, Left Updated: 05/21/24 0625     Blood Culture Staphylococcus, coagulase negative     Isolated from Aerobic Bottle     Gram Stain Aerobic Bottle Gram positive cocci in clusters    Narrative:      Probable contaminant requires clinical correlation, susceptibility not performed unless requested by  physician.      Blood Culture ID, PCR - Blood, Arm, Left [[id number]]  (Abnormal) Collected: 05/18/24 2046    Lab Status: Final result Specimen: Blood from Arm, Left Updated: 05/19/24 2001     BCID, PCR Staph spp, not aureus or lugdunensis. Identification by BCID2 PCR.     BOTTLE TYPE Aerobic Bottle    Blood Culture - Blood, Arm, Left [[id number]]  (Normal) Collected: 05/18/24 2025    Lab Status: Preliminary result Specimen: Blood from Arm, Left Updated: 05/20/24 2045     Blood Culture No growth at 2 days    COVID-19, FLU A/B, RSV PCR 1 HR TAT - Swab, Nasopharynx [[id number]]  (Normal) Collected: 05/13/24 2257    Lab Status: Final result Specimen: Swab from Nasopharynx Updated: 05/13/24 2348     COVID19 Not Detected     Influenza A PCR Not Detected     Influenza B PCR Not Detected     RSV, PCR Not Detected    Narrative:      Fact sheet for providers: https://www.fda.gov/media/363585/download    Fact sheet for patients: https://www.fda.gov/media/748812/download    Test performed by PCR.    Blood Culture - Blood, Hand, Right [[id number]]  (Normal) Collected: 05/13/24 1934    Lab Status: Final result Specimen: Blood from Hand, Right Updated: 05/18/24 1947     Blood Culture No growth at 5 days    Blood Culture - Blood, Hand, Digit Right [[id number]]  (Normal) Collected: 05/13/24 1934    Lab Status: Final result Specimen: Blood from Hand, Digit Right Updated: 05/18/24 1947     Blood Culture No growth at 5 days    Urine Culture - Urine, Urine, Clean Catch [[id number]] Collected: 05/13/24 1756    Lab Status: Final result Specimen: Urine, Clean Catch Updated: 05/15/24 1208     Urine Culture >100,000 CFU/mL Normal Urogenital Rachel    Narrative:      Colonization of the urinary tract without infection is common. Treatment is discouraged unless the patient is symptomatic, pregnant, or undergoing an invasive urologic procedure.          [x]  Radiology  XR Chest 1 View    Result Date: 5/20/2024  Lungs are slightly better aerated than  on prior exam, suggesting improving pneumonia or pulmonary edema.   Electronically Signed By-Theo Castaneda MD On:5/20/2024 3:14 PM      XR Abdomen KUB    Result Date: 5/19/2024  Impression: No evidence of stool impaction or obstruction   Electronically Signed By-Darrick Gomez On:5/19/2024 6:19 PM      XR Chest 1 View    Result Date: 5/18/2024  The endotracheal (ET) tube, nasogastric (NG) tube, and right IJ central venous line are thought to be in satisfactory position. Bilateral airspace opacities are present and may represent infectious multifocal pneumonia. No pneumothorax is seen. Please see above comments for further detail.    Please note that portions of this note were completed with a voice recognition program.     Electronically Signed By-Herberth Arevalo MD On:5/18/2024 8:34 PM      MRI Brain Without Contrast    Result Date: 5/18/2024  Impression:  1. No acute process 2. Parenchymal atrophy and chronic small vessel ischemic white matter changes    Electronically Signed By-Darrick Gomez On:5/18/2024 5:55 PM      US Abdomen Limited    Result Date: 5/16/2024  Impression: Inadequate ascites for safe paracentesis at this time.  Electronically Signed By-FABI DIAZ MD On:5/16/2024 11:58 AM      CT Head Without Contrast    Result Date: 5/13/2024  1.  Evidence for some frontal lobe atrophy.  Electronically Signed By-Tarun Grant MD On:5/13/2024 4:59 PM      XR Chest 1 View    Result Date: 5/13/2024  No acute process.  Electronically Signed By-Gunnar Pedro MD On:5/13/2024 2:58 PM     []  EKG/Telemetry   []  Cardiology/Vascular   []  Pathology  []  Old records  []  Other:    Assessment & Plan   Assessment / Plan     Assessment:  Hepatic encephalopathy  Seizure  Hyperammonemia  S/p mechanical ventilation for airway protection, self extubated on 5/20  Decompensated ROMO cirrhosis  Staph bacteremia, likely contamination  Chronic thrombocytopenia  Chronic anemia  Frontal lobe atrophy  History of GI bleed  secondary to peptic ulcer  History of GERD without esophagitis  Essential hypertension  Insulin-dependent type 2 diabetes  Anxiety and depression  Morbid obesity BMI 43    Plan:  Patient currently being managed in critical care unit, appreciate intensivist input.  Self extubated on 5/20, saturating well on room air.  Patient more awake but still confused.  Had multiple bowel movements.  Continue lactulose 30 g 4 times daily.  Continue rifaximin.  Blood culture 5/18 grew gram-positive cocci, staph species not aureus or lugdunensis in 1 set of blood culture;.  Blood culture sent on 5/20, preliminary results showing no growth at 24 hours in 1 set.  Will follow-up final result.  Likely contaminant.  Currently saturating well on room air.  On Keppra, continue.  EEG on 5/20 showed no epileptiform discharges.  Neurology following the patient, appreciate input.  Currently on 2 point soft restraints to prevent self-harm and pulling of tubes/lines.  Hemoglobin 9.3, iron panel sent.  Will follow-up result.  Transfuse as needed to maintain hemoglobin more than 7 given no active bleeding.  Plan to transfer patient out of critical care unit.  Rest of the management as per intensivist.    Discussed with RN.    DVT prophylaxis:  Medical and mechanical DVT prophylaxis orders are present.        CODE STATUS:   Medical Intervention Limits: NO intubation (DNI)  Level Of Support Discussed With: Next of Kin (If No Surrogate)  Code Status (Patient has no pulse and is not breathing): No CPR (Do Not Attempt to Resuscitate)  Medical Interventions (Patient has pulse or is breathing): Limited Support      Electronically signed by Rinku Gao MD, 05/21/24, 10:49 AM EDT.

## 2024-05-21 NOTE — PROGRESS NOTES
Respiratory Therapist Broncho-Pulmonary Hygiene Progress Note      Patient Name:  Nic Nicolas  YOB: 1965    Nic Nicolas meets the qualification for Level 1 of the Bronco-Pulmonary Hygiene Protocol. This was based on my daily patient assessment and includes review of chest x-ray results, cough ability and quality, oxygenation, secretions or risk for secretion development and patient mobility.     Broncho-Pulmonary Hygiene Assessment:    Level of Movement: Low level of mobility  Lethargic uncoorperative    Breath Sounds: Clear to slightly diminished    Cough: Strong, effective    Chest X-Ray: Possible signs of consolidation and/or atelectasis or clear.     Sputum Productions: None or small amount of thin or watery secretions with effective cough    History and Physical: Chronic condition    SpO2 to Oxygen Need: greater than 92% on room air or  less than 3L nasal canula    Current SpO2 is: 98% on Room Air    Based on this information, I have completed the following interventions: Teach/Instruct patient on cough and deep breathe      Electronically signed by Mona Estrada RRT, 05/21/24, 10:57 AM EDT.

## 2024-05-21 NOTE — PLAN OF CARE
Goal Outcome Evaluation:      Remains off pressors. TF @ goal. VSS. Remains oriented to self, multiple BM's with lactulose. Attempted x2 to call family with updates, unable to reach sister.
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
## 2024-05-21 NOTE — PROGRESS NOTES
Pulmonary / Critical Care Progress Note      Patient Name: Nic Nicolas  : 1965  MRN: [id number]  Attending:  Rinku Gao MD   Date of admission: 2024    Subjective   Subjective   Patient critically ill with seizures, hepatic encephalopathy    Over past 24 hours:  Received bowel regimen as well as lactulose  No BM reported  Sedation on hold this a.m.  Tolerating SBT  Started IV fluids, creatinine with upward trend      Today  In bed with head of bed elevated  Will open eyes to painful stimulation  Has had multiple bowel movements  Creatinine 1.22    Objective   Objective     Vitals:   Vital signs for last 24 hours:  Temp:  [97.3 °F (36.3 °C)-99.1 °F (37.3 °C)] 97.7 °F (36.5 °C)  Heart Rate:  [67-84] 71  BP: ()/(36-92) 116/54  FiO2 (%):  [24 %] 24 %    Intake/Output last 3 shifts:  I/O last 3 completed shifts:  In: 3329.8 [I.V.:2699.8; NG/GT:630]  Out: 1200 [Urine:1200]  Intake/Output this shift:  No intake/output data recorded.    Vent settings for last 24 hours:  Mode: PS  FiO2 (%):  [24 %] 24 %  PEEP/CPAP (cm H2O):  [5 cm H20] 5 cm H20  [place] SUP:  [10 cm H20] 10 cm H20  MAP (cm H2O):  [7.9-8] 7.9    Hemodynamic parameters for last 24 hours:       Physical Exam   Vital Signs Reviewed  Chronically ill male encephalopathic  Chest: No work of breathing noted, equal rise and fall of chest, clear to auscultation  CV: RRR, no MGR, pulses 2+, equal.  Neuro: Will grimace and open eyes to painful stimuli  Skin: No rashes or lesions noted          Result Review    Result Review:  I have personally reviewed the results from the time of this admission to 2024 07:49 EDT and agree with these findings:  [x]  Laboratory  [x]  Microbiology  [x]  Radiology  [x]  EKG/Telemetry   [x]  Cardiology/Vascular   []  Pathology  []  Old records  []  Other:  Most notable findings include:       Lab 24  0518 24  0442 24  0431 24  2046 24  1819 24  0459  05/17/24  0451 05/16/24  0503 05/15/24  0506   WBC 4.97 9.11 4.39  --   --   --   --  4.90 3.88 4.27   HEMOGLOBIN 9.3* 11.0* 10.2*  --   --   --   --  9.9* 9.9* 10.1*   HEMATOCRIT 28.6* 33.5* 31.3*  --   --   --   --  30.5* 30.4* 31.9*   PLATELETS 46* 95* 66*  --   --   --   --  65* 70* 69*   SODIUM 135* 136 135* 136  --   --  138 135* 140 141   SODIUM, ARTERIAL  --   --   --   --  133.9* 136.2  --   --   --   --    POTASSIUM 3.9 3.8 3.6 3.8  --   --  3.8 3.7 3.4* 4.0   CHLORIDE 104 101 100 100  --   --  103 102 106 105   CO2 23.0 21.9* 22.1 21.8*  --   --  22.2 21.4* 22.9 22.4   BUN 45* 39* 30* 27*  --   --  25* 24* 21* 19   CREATININE 1.22 1.68* 1.36* 1.33*  --   --  1.03 1.32* 1.09 0.86   GLUCOSE 200* 177* 130* 175*  --   --  118* 170* 135* 142*   GLUCOSE, ARTERIAL  --   --   --   --  166* 156*  --   --   --   --    CALCIUM 8.2* 8.6 8.8 8.8  --   --  9.0 8.8 9.1 9.1   PHOSPHORUS 2.7 4.6* 4.7* 4.3  --   --   --   --   --   --    TOTAL PROTEIN 5.7* 6.2 5.8*  --   --   --   --  5.9* 5.8* 6.1   ALBUMIN 2.8* 3.2* 3.0*  --   --   --   --  3.0* 2.9* 3.2*   GLOBULIN 2.9 3.0 2.8  --   --   --   --  2.9 2.9 2.9         Assessment & Plan   Assessment / Plan     Active Hospital Problems:  Active Hospital Problems    Diagnosis     **Hepatic encephalopathy    Seizures  Intubated and placed on ventilator for airway protection  Deep sedation secondary to persistent seizure activity  Hyperammonemia  Hepatic encephalopathy  Decompensated cirrhosis  Hypokalemia  Hyponatremia, clinically insignificant  Acute kidney injury secondary to prerenal etiology     Plan:   DC IV fluids  DC central line  O2 ordered if needed to maintain SpO2 90% or greater  Repeat blood culture NGTD x 24.  Initial blood cultures 1 out of 2 with coag negative staph.  Likely contaminant  Continue lactulose 30 g 4 times daily  Continue rifaximin  Okay to utilize FMS if needed  Neurology on board  Continue Keppra  EEG with no epileptiform discharges, slowed  background activity compatible with moderate and diffuse encephalopathy  NG tube in place.  Dietitian on board.  Tube feeds and free water recommendations per them  Trend H&H.  Recommend transfusion for Hgb 7 or less.  Platelet count 46.  No active bleeding noted  Continue Coreg scheduled with parameters  Trend renal function electrolytes.         DVT prophylaxis:  Medical and mechanical DVT prophylaxis orders are present.    CODE STATUS:   Medical Intervention Limits: NO intubation (DNI)  Level Of Support Discussed With: Next of Kin (If No Surrogate)  Code Status (Patient has no pulse and is not breathing): No CPR (Do Not Attempt to Resuscitate)  Medical Interventions (Patient has pulse or is breathing): Limited Support      Patient is critically ill with witnessed in hospital seizures, intubated and placed on ventilator for airway protection, hyperammonemia, hepatic encephalopathy. . We have personally reviewed all pertinent labs, imaging, microbiology and documentation. We have discussed care with the primary service as well as at multidisciplinary critical care rounds with the bedside nurse, respiratory therapist, pharmacist and all other ancillary services. 34 minutes of critical care time was spent managing this patient, excluding procedures. Of this time, I spent 19 minutes in accordance with split shared billing.    I, SOLEDAD Martino spent 15 minutes in accordance with split shared billing.  Electronically signed by SOLEDAD Rapp, 05/21/24, 3:16 PM EDT.  Electronically signed by Star Oscar MD, 05/21/24, 3:50 PM EDT.

## 2024-05-22 LAB
A PHAGOCYTOPH DNA BLD QL NAA+PROBE: NEGATIVE
ALBUMIN SERPL-MCNC: 2.8 G/DL (ref 3.5–5.2)
ALBUMIN/GLOB SERPL: 1 G/DL
ALP SERPL-CCNC: 75 U/L (ref 39–117)
ALT SERPL W P-5'-P-CCNC: 20 U/L (ref 1–41)
AMMONIA BLD-SCNC: 59 UMOL/L (ref 16–60)
ANION GAP SERPL CALCULATED.3IONS-SCNC: 9.9 MMOL/L (ref 5–15)
AST SERPL-CCNC: 35 U/L (ref 1–40)
BABESIA SPEC: NEGATIVE
BACTERIA SPEC AEROBE CULT: ABNORMAL
BASOPHILS # BLD AUTO: 0.02 10*3/MM3 (ref 0–0.2)
BASOPHILS NFR BLD AUTO: 0.4 % (ref 0–1.5)
BILIRUB SERPL-MCNC: 1.1 MG/DL (ref 0–1.2)
BUN SERPL-MCNC: 38 MG/DL (ref 6–20)
BUN/CREAT SERPL: 35.8 (ref 7–25)
CALCIUM SPEC-SCNC: 8.2 MG/DL (ref 8.6–10.5)
CHLORIDE SERPL-SCNC: 107 MMOL/L (ref 98–107)
CO2 SERPL-SCNC: 18.1 MMOL/L (ref 22–29)
CREAT SERPL-MCNC: 1.06 MG/DL (ref 0.76–1.27)
DEPRECATED RDW RBC AUTO: 60.4 FL (ref 37–54)
E CHAFFEENSIS DNA BLD QL NAA+PROBE: NEGATIVE
EGFRCR SERPLBLD CKD-EPI 2021: 81.3 ML/MIN/1.73
EOSINOPHIL # BLD AUTO: 0.15 10*3/MM3 (ref 0–0.4)
EOSINOPHIL NFR BLD AUTO: 2.8 % (ref 0.3–6.2)
ERYTHROCYTE [DISTWIDTH] IN BLOOD BY AUTOMATED COUNT: 18.8 % (ref 12.3–15.4)
FERRITIN SERPL-MCNC: 67.4 NG/ML (ref 30–400)
GLOBULIN UR ELPH-MCNC: 2.7 GM/DL
GLUCOSE BLDC GLUCOMTR-MCNC: 133 MG/DL (ref 70–99)
GLUCOSE BLDC GLUCOMTR-MCNC: 175 MG/DL (ref 70–99)
GLUCOSE BLDC GLUCOMTR-MCNC: 179 MG/DL (ref 70–99)
GLUCOSE BLDC GLUCOMTR-MCNC: 205 MG/DL (ref 70–99)
GLUCOSE BLDC GLUCOMTR-MCNC: 208 MG/DL (ref 70–99)
GLUCOSE SERPL-MCNC: 214 MG/DL (ref 65–99)
GRAM STN SPEC: ABNORMAL
GRAM STN SPEC: ABNORMAL
HCT VFR BLD AUTO: 30.8 % (ref 37.5–51)
HGB BLD-MCNC: 9.9 G/DL (ref 13–17.7)
IMM GRANULOCYTES # BLD AUTO: 0.01 10*3/MM3 (ref 0–0.05)
IMM GRANULOCYTES NFR BLD AUTO: 0.2 % (ref 0–0.5)
ISOLATED FROM: ABNORMAL
LYMPHOCYTES # BLD AUTO: 0.63 10*3/MM3 (ref 0.7–3.1)
LYMPHOCYTES NFR BLD AUTO: 11.9 % (ref 19.6–45.3)
MAGNESIUM SERPL-MCNC: 2.4 MG/DL (ref 1.6–2.6)
MCH RBC QN AUTO: 28.6 PG (ref 26.6–33)
MCHC RBC AUTO-ENTMCNC: 32.1 G/DL (ref 31.5–35.7)
MCV RBC AUTO: 89 FL (ref 79–97)
MONOCYTES # BLD AUTO: 0.76 10*3/MM3 (ref 0.1–0.9)
MONOCYTES NFR BLD AUTO: 14.3 % (ref 5–12)
NEUTROPHILS NFR BLD AUTO: 3.74 10*3/MM3 (ref 1.7–7)
NEUTROPHILS NFR BLD AUTO: 70.4 % (ref 42.7–76)
NRBC BLD AUTO-RTO: 0 /100 WBC (ref 0–0.2)
PHOSPHATE SERPL-MCNC: 1.9 MG/DL (ref 2.5–4.5)
PLATELET # BLD AUTO: 64 10*3/MM3 (ref 140–450)
PMV BLD AUTO: 12.3 FL (ref 6–12)
POTASSIUM SERPL-SCNC: 3.8 MMOL/L (ref 3.5–5.2)
PROT SERPL-MCNC: 5.5 G/DL (ref 6–8.5)
RBC # BLD AUTO: 3.46 10*6/MM3 (ref 4.14–5.8)
SODIUM SERPL-SCNC: 135 MMOL/L (ref 136–145)
WBC NRBC COR # BLD AUTO: 5.31 10*3/MM3 (ref 3.4–10.8)

## 2024-05-22 PROCEDURE — 25810000003 SODIUM CHLORIDE 0.9 % SOLUTION: Performed by: INTERNAL MEDICINE

## 2024-05-22 PROCEDURE — 82948 REAGENT STRIP/BLOOD GLUCOSE: CPT

## 2024-05-22 PROCEDURE — 85025 COMPLETE CBC W/AUTO DIFF WBC: CPT | Performed by: STUDENT IN AN ORGANIZED HEALTH CARE EDUCATION/TRAINING PROGRAM

## 2024-05-22 PROCEDURE — 25010000002 HYALURONIDASE (HUMAN) 150 UNIT/ML SOLUTION 1 ML VIAL: Performed by: STUDENT IN AN ORGANIZED HEALTH CARE EDUCATION/TRAINING PROGRAM

## 2024-05-22 PROCEDURE — 83735 ASSAY OF MAGNESIUM: CPT | Performed by: STUDENT IN AN ORGANIZED HEALTH CARE EDUCATION/TRAINING PROGRAM

## 2024-05-22 PROCEDURE — 84100 ASSAY OF PHOSPHORUS: CPT | Performed by: STUDENT IN AN ORGANIZED HEALTH CARE EDUCATION/TRAINING PROGRAM

## 2024-05-22 PROCEDURE — 80053 COMPREHEN METABOLIC PANEL: CPT | Performed by: PHYSICIAN ASSISTANT

## 2024-05-22 PROCEDURE — 63710000001 INSULIN LISPRO (HUMAN) PER 5 UNITS: Performed by: INTERNAL MEDICINE

## 2024-05-22 PROCEDURE — 25010000002 NA FERRIC GLUC CPLX PER 12.5 MG: Performed by: STUDENT IN AN ORGANIZED HEALTH CARE EDUCATION/TRAINING PROGRAM

## 2024-05-22 PROCEDURE — 99232 SBSQ HOSP IP/OBS MODERATE 35: CPT | Performed by: STUDENT IN AN ORGANIZED HEALTH CARE EDUCATION/TRAINING PROGRAM

## 2024-05-22 PROCEDURE — 25010000002 LORAZEPAM PER 2 MG: Performed by: PHYSICIAN ASSISTANT

## 2024-05-22 PROCEDURE — 99233 SBSQ HOSP IP/OBS HIGH 50: CPT | Performed by: INTERNAL MEDICINE

## 2024-05-22 PROCEDURE — 82728 ASSAY OF FERRITIN: CPT | Performed by: STUDENT IN AN ORGANIZED HEALTH CARE EDUCATION/TRAINING PROGRAM

## 2024-05-22 PROCEDURE — 25010000002 LEVETIRACETAM IN NACL 0.82% 500 MG/100ML SOLUTION: Performed by: PSYCHIATRY & NEUROLOGY

## 2024-05-22 PROCEDURE — 82140 ASSAY OF AMMONIA: CPT | Performed by: STUDENT IN AN ORGANIZED HEALTH CARE EDUCATION/TRAINING PROGRAM

## 2024-05-22 PROCEDURE — 82948 REAGENT STRIP/BLOOD GLUCOSE: CPT | Performed by: INTERNAL MEDICINE

## 2024-05-22 PROCEDURE — 25010000002 THIAMINE PER 100 MG: Performed by: PSYCHIATRY & NEUROLOGY

## 2024-05-22 RX ORDER — FENTANYL/ROPIVACAINE/NS/PF 2-625MCG/1
15 PLASTIC BAG, INJECTION (ML) EPIDURAL
Qty: 500 ML | Refills: 0 | Status: ACTIVE | OUTPATIENT
Start: 2024-05-22 | End: 2024-05-22

## 2024-05-22 RX ORDER — FENTANYL/ROPIVACAINE/NS/PF 2-625MCG/1
15 PLASTIC BAG, INJECTION (ML) EPIDURAL
Status: DISCONTINUED | OUTPATIENT
Start: 2024-05-22 | End: 2024-05-22

## 2024-05-22 RX ORDER — LACTULOSE 10 G/15ML
30 SOLUTION ORAL EVERY 6 HOURS
Status: DISCONTINUED | OUTPATIENT
Start: 2024-05-22 | End: 2024-05-24

## 2024-05-22 RX ORDER — LACTULOSE 10 G/15ML
30 SOLUTION ORAL EVERY 8 HOURS
Status: DISCONTINUED | OUTPATIENT
Start: 2024-05-22 | End: 2024-05-22

## 2024-05-22 RX ADMIN — HYALURONIDASE (HUMAN RECOMBINANT) 150 UNITS: 150 INJECTION, SOLUTION SUBCUTANEOUS at 16:55

## 2024-05-22 RX ADMIN — RIFAXIMIN 550 MG: 550 TABLET ORAL at 08:21

## 2024-05-22 RX ADMIN — THIAMINE HYDROCHLORIDE 100 MG: 100 INJECTION, SOLUTION INTRAMUSCULAR; INTRAVENOUS at 19:44

## 2024-05-22 RX ADMIN — PANTOPRAZOLE SODIUM 40 MG: 40 INJECTION, POWDER, FOR SOLUTION INTRAVENOUS at 05:56

## 2024-05-22 RX ADMIN — Medication 1000 UNITS: at 08:20

## 2024-05-22 RX ADMIN — SUCRALFATE 1 G: 1 TABLET ORAL at 17:07

## 2024-05-22 RX ADMIN — SUCRALFATE 1 G: 1 TABLET ORAL at 13:26

## 2024-05-22 RX ADMIN — Medication 10 ML: at 08:21

## 2024-05-22 RX ADMIN — SODIUM CHLORIDE 125 MG: 9 INJECTION, SOLUTION INTRAVENOUS at 08:39

## 2024-05-22 RX ADMIN — THIAMINE HYDROCHLORIDE 100 MG: 100 INJECTION, SOLUTION INTRAMUSCULAR; INTRAVENOUS at 03:51

## 2024-05-22 RX ADMIN — HYDROCORTISONE 1 APPLICATION: 1 OINTMENT TOPICAL at 16:59

## 2024-05-22 RX ADMIN — SUCRALFATE 1 G: 1 TABLET ORAL at 08:19

## 2024-05-22 RX ADMIN — INSULIN LISPRO 2 UNITS: 100 INJECTION, SOLUTION INTRAVENOUS; SUBCUTANEOUS at 08:20

## 2024-05-22 RX ADMIN — LACTULOSE 30 G: 20 SOLUTION ORAL at 19:44

## 2024-05-22 RX ADMIN — LEVETIRACETAM 500 MG: 5 INJECTION, SOLUTION INTRAVENOUS at 13:26

## 2024-05-22 RX ADMIN — ACETAMINOPHEN 650 MG: 325 TABLET ORAL at 20:40

## 2024-05-22 RX ADMIN — CARVEDILOL 6.25 MG: 6.25 TABLET, FILM COATED ORAL at 08:20

## 2024-05-22 RX ADMIN — BUSPIRONE HYDROCHLORIDE 7.5 MG: 5 TABLET ORAL at 16:59

## 2024-05-22 RX ADMIN — LACTULOSE 30 G: 20 SOLUTION ORAL at 03:52

## 2024-05-22 RX ADMIN — LORAZEPAM 1 MG: 2 INJECTION INTRAMUSCULAR; INTRAVENOUS at 00:03

## 2024-05-22 RX ADMIN — RIFAXIMIN 550 MG: 550 TABLET ORAL at 20:41

## 2024-05-22 RX ADMIN — BUSPIRONE HYDROCHLORIDE 7.5 MG: 5 TABLET ORAL at 08:19

## 2024-05-22 RX ADMIN — HYDROCORTISONE 1 APPLICATION: 1 OINTMENT TOPICAL at 21:09

## 2024-05-22 RX ADMIN — BUSPIRONE HYDROCHLORIDE 7.5 MG: 5 TABLET ORAL at 20:40

## 2024-05-22 RX ADMIN — CARVEDILOL 6.25 MG: 6.25 TABLET, FILM COATED ORAL at 17:07

## 2024-05-22 RX ADMIN — Medication 10 ML: at 20:41

## 2024-05-22 RX ADMIN — LEVETIRACETAM 500 MG: 5 INJECTION, SOLUTION INTRAVENOUS at 00:07

## 2024-05-22 RX ADMIN — LEVETIRACETAM 500 MG: 5 INJECTION, SOLUTION INTRAVENOUS at 23:43

## 2024-05-22 RX ADMIN — SUCRALFATE 1 G: 1 TABLET ORAL at 20:41

## 2024-05-22 RX ADMIN — LEVETIRACETAM 500 MG: 5 INJECTION, SOLUTION INTRAVENOUS at 05:56

## 2024-05-22 RX ADMIN — POTASSIUM PHOSPHATES 15 MMOL: 236; 224 INJECTION, SOLUTION INTRAVENOUS at 14:28

## 2024-05-22 RX ADMIN — LACTULOSE 30 G: 20 SOLUTION ORAL at 14:27

## 2024-05-22 RX ADMIN — SERTRALINE HYDROCHLORIDE 100 MG: 100 TABLET, FILM COATED ORAL at 20:41

## 2024-05-22 RX ADMIN — THIAMINE HYDROCHLORIDE 100 MG: 100 INJECTION, SOLUTION INTRAMUSCULAR; INTRAVENOUS at 13:26

## 2024-05-22 RX ADMIN — LACTULOSE 30 G: 20 SOLUTION ORAL at 10:13

## 2024-05-22 RX ADMIN — POTASSIUM PHOSPHATES 15 MMOL: 236; 224 INJECTION, SOLUTION INTRAVENOUS at 10:13

## 2024-05-22 RX ADMIN — INSULIN LISPRO 2 UNITS: 100 INJECTION, SOLUTION INTRAVENOUS; SUBCUTANEOUS at 17:24

## 2024-05-22 NOTE — PROGRESS NOTES
[facility]   Hospitalist Progress Note  Date: 2024  Patient Name: Nic Nicolas  : 1965  MRN: [id number]  Date of admission: 2024  Room/Bed: 260/1      Subjective   Subjective     Chief Complaint: Follow up for confusion     Summary:Nic Nicolas is a 59 y.o. male th cirrhosis of the liver due to ROMO can have a hepatic encephalopathy, DVT in the past, diabetes on insulin, hypertension, obstructive sleep apnea, TBI, and asthma/COPD who presents with confusion. VSS. CBC shows no leukocytosis but does show a hemoglobin of 10.7 which is above his baseline.  CMP shows a slightly elevated bilirubin at 2.4.  Ammonia level is 96.  UA shows moderate blood, positive nitrite, small leuk esterase trace bacteria.  CT of the head shows global/frontal lobe atrophy.  Patient was given 2 g of ceftriaxone and started on lactulose q4 hours.  Gradual improvement in mentation.  Ammonia normalized.  No ascites on ultrasound.  Completed Rocephin.  Had CODE BLUE called after getting MRI of the brain where he became cyanotic with agonal breathing but never lost a pulse.  Had multiple seizures and had to be intubated for airway protection.  On Keppra for seizures.  Patient was started on lactulose.  Was on propofol/fentanyl drip for sedation due to seizure activities.  Patient self extubated on .  Saturating in room air thereafter.  Was also on Levophed for pressor support.  Eventually patient was weaned off Levophed and blood pressure has been stable.  Sedation was discontinued.  Also had multiple bowel movements after which she was more awake and alert.  He was transferred out of critical care unit on .    Interval Followup: No acute event overnight.  Patient more awake and alert today.  Oriented x 3, but still confused and trying to get out of bed.  Needing reorientation frequently. Saturating well on room air.    Having multiple bowel movements.    Review of Systems    All systems reviewed and  negative except for what is outlined above.      Objective   Objective     Vitals:   Temp:  [97.2 °F (36.2 °C)-98.4 °F (36.9 °C)] 98.4 °F (36.9 °C)  Heart Rate:  [67-77] 77  Resp:  [18-20] 18  BP: (106-154)/(46-90) 140/64    Physical Exam   General: Awake and alert, follow commands.  Confused needing frequent reorientation.  cardiovascular: RRR, no murmurs   Pulmonary: CTA bilaterally; no wheezes; no conversational dyspnea  Gastrointestinal: S/ND/NT, +BS  Neuro: Awake, alert and oriented x 3 but intermittently confused.  Requiring frequent reorientation.    Result Review    Result Review:  I have personally reviewed these results:  [x]  Laboratory      Lab 05/22/24  0419 05/21/24  0518 05/20/24  0442 05/19/24  0431 05/19/24  0000 05/18/24 2046 05/18/24 2010 05/17/24 0451 05/16/24  0503   WBC 5.31 4.97 9.11   < >  --   --   --    < > 3.88   HEMOGLOBIN 9.9* 9.3* 11.0*   < >  --   --   --    < > 9.9*   HEMATOCRIT 30.8* 28.6* 33.5*   < >  --   --   --    < > 30.4*   PLATELETS 64* 46* 95*   < >  --   --   --    < > 70*   NEUTROS ABS 3.74  --   --   --   --   --   --   --   --    IMMATURE GRANS (ABS) 0.01  --   --   --   --   --   --   --   --    LYMPHS ABS 0.63*  --   --   --   --   --   --   --   --    MONOS ABS 0.76  --   --   --   --   --   --   --   --    EOS ABS 0.15  --   --   --   --   --   --   --   --    MCV 89.0 87.5 86.3   < >  --   --   --    < > 85.6   CRP  --   --   --   --   --  <0.30  --   --   --    LACTATE  --   --   --   --  1.5 3.2*  --   --   --    LACTATE, ARTERIAL  --   --   --   --   --   --  3.62*   < >  --    APTT  --   --   --   --   --   --   --   --  36.3*   D DIMER QUANT  --   --   --   --   --  1.22*  --   --   --     < > = values in this interval not displayed.         Lab 05/22/24 0419 05/21/24 0518 05/20/24 0442 05/18/24 2046 05/18/24 2010 05/18/24  1819   SODIUM 135* 135* 136   < >  --   --    SODIUM, ARTERIAL  --   --   --   --  133.9* 136.2   POTASSIUM 3.8 3.9 3.8   < >  --    --    CHLORIDE 107 104 101   < >  --   --    CO2 18.1* 23.0 21.9*   < >  --   --    ANION GAP 9.9 8.0 13.1   < >  --   --    BUN 38* 45* 39*   < >  --   --    CREATININE 1.06 1.22 1.68*   < >  --   --    EGFR 81.3 68.7 46.8*   < >  --   --    GLUCOSE 214* 200* 177*   < >  --   --    GLUCOSE, ARTERIAL  --   --   --   --  166* 156*   CALCIUM 8.2* 8.2* 8.6   < >  --   --    IONIZED CALCIUM  --   --   --   --  1.12* 1.15   MAGNESIUM 2.4 2.2 2.2   < >  --   --    PHOSPHORUS 1.9* 2.7 4.6*   < >  --   --     < > = values in this interval not displayed.         Lab 05/22/24 0419 05/21/24  0518 05/20/24  0442   TOTAL PROTEIN 5.5* 5.7* 6.2   ALBUMIN 2.8* 2.8* 3.2*   GLOBULIN 2.7 2.9 3.0   ALT (SGPT) 20 18 19   AST (SGOT) 35 36 38   BILIRUBIN 1.1 1.3* 2.1*   ALK PHOS 75 67 73                 Lab 05/22/24 0419 05/21/24 0518   IRON  --  35*   IRON SATURATION (TSAT)  --  11*   TIBC  --  326   TRANSFERRIN  --  219   FERRITIN 67.40  --          Lab 05/18/24 2010 05/18/24  1819   PH, ARTERIAL 7.268* 7.243*   PCO2, ARTERIAL 45.7* 35.9   PO2 .0* 204.7*   O2 SATURATION ART 97.6 98.9   FIO2 40 100   HCO3 ART 20.4* 15.1*   BASE EXCESS ART -6.4* -11.3*   CARBOXYHEMOGLOBIN 0.2 0.5     Brief Urine Lab Results  (Last result in the past 365 days)        Color   Clarity   Blood   Leuk Est   Nitrite   Protein   CREAT   Urine HCG        05/18/24 2110 Yellow   Clear   Negative   Negative   Negative   Negative                 [x]  Microbiology   Microbiology Results (last 10 days)       Procedure Component Value - Date/Time    Blood Culture - Blood, Arm, Right [[id number]]  (Normal) Collected: 05/20/24 1220    Lab Status: Preliminary result Specimen: Blood from Arm, Right Updated: 05/22/24 0701     Blood Culture No growth at 24 hours    Blood Culture - Blood, Hand, Left [[id number]]  (Normal) Collected: 05/20/24 1206    Lab Status: Preliminary result Specimen: Blood from Hand, Left Updated: 05/21/24 1245     Blood Culture No growth at 24  hours    Respiratory Culture - Sputum, ET Suction [[id number]] Collected: 05/18/24 2126    Lab Status: Final result Specimen: Sputum from ET Suction Updated: 05/20/24 1056     Respiratory Culture Scant growth (1+) Normal respiratory marion. No S. aureus or Pseudomonas aeruginosa detected. Final report.     Gram Stain Moderate (3+) WBCs seen      Moderate (3+) Epithelial cells seen      Few (2+) Gram positive cocci in pairs, chains and clusters    MRSA Screen, PCR (Inpatient) - Swab, Nares [[id number]]  (Normal) Collected: 05/18/24 2112    Lab Status: Final result Specimen: Swab from Nares Updated: 05/18/24 2235     MRSA PCR No MRSA Detected    Narrative:      The negative predictive value of this diagnostic test is high and should only be used to consider de-escalating anti-MRSA therapy. A positive result may indicate colonization with MRSA and must be correlated clinically.    Respiratory Panel PCR w/COVID-19(SARS-CoV-2) BRENDAN/LE/CATRACHITA/PAD/COR/ALFREDO In-House, NP Swab in UTM/VTM, 2 HR TAT - Swab, Nasopharynx [[id number]]  (Normal) Collected: 05/18/24 2112    Lab Status: Final result Specimen: Swab from Nasopharynx Updated: 05/19/24 0655     ADENOVIRUS, PCR Not Detected     Coronavirus 229E Not Detected     Coronavirus HKU1 Not Detected     Coronavirus NL63 Not Detected     Coronavirus OC43 Not Detected     COVID19 Not Detected     Human Metapneumovirus Not Detected     Human Rhinovirus/Enterovirus Not Detected     Influenza A PCR Not Detected     Influenza B PCR Not Detected     Parainfluenza Virus 1 Not Detected     Parainfluenza Virus 2 Not Detected     Parainfluenza Virus 3 Not Detected     Parainfluenza Virus 4 Not Detected     RSV, PCR Not Detected     Bordetella pertussis pcr Not Detected     Bordetella parapertussis PCR Not Detected     Chlamydophila pneumoniae PCR Not Detected     Mycoplasma pneumo by PCR Not Detected    Narrative:      In the setting of a positive respiratory panel with a viral infection PLUS a  negative procalcitonin without other underlying concern for bacterial infection, consider observing off antibiotics or discontinuation of antibiotics and continue supportive care. If the respiratory panel is positive for atypical bacterial infection (Bordetella pertussis, Chlamydophila pneumoniae, or Mycoplasma pneumoniae), consider antibiotic de-escalation to target atypical bacterial infection.    S. Pneumo Ag Urine or CSF - Urine, Urine, Clean Catch [[id number]]  (Normal) Collected: 05/18/24 2110    Lab Status: Final result Specimen: Urine, Clean Catch Updated: 05/18/24 2151     Strep Pneumo Ag Negative    Legionella Antigen, Urine - Urine, Urine, Clean Catch [[id number]]  (Normal) Collected: 05/18/24 2110    Lab Status: Final result Specimen: Urine, Clean Catch Updated: 05/18/24 2151     LEGIONELLA ANTIGEN, URINE Negative    Blood Culture - Blood, Arm, Left [[id number]]  (Abnormal) Collected: 05/18/24 2046    Lab Status: Edited Result - FINAL Specimen: Blood from Arm, Left Updated: 05/22/24 0706     Blood Culture Staphylococcus, coagulase negative     Isolated from Aerobic and Anaerobic Bottles     Gram Stain Aerobic Bottle Gram positive cocci in clusters      Anaerobic Bottle Gram positive cocci in clusters     Comment: Appended report. These results have been appended to a previously final verified report.       Narrative:      Probable contaminant requires clinical correlation, susceptibility not performed unless requested by physician.      Blood Culture ID, PCR - Blood, Arm, Left [[id number]]  (Abnormal) Collected: 05/18/24 2046    Lab Status: Final result Specimen: Blood from Arm, Left Updated: 05/19/24 2001     BCID, PCR Staph spp, not aureus or lugdunensis. Identification by BCID2 PCR.     BOTTLE TYPE Aerobic Bottle    Blood Culture - Blood, Arm, Left [[id number]]  (Normal) Collected: 05/18/24 2025    Lab Status: Preliminary result Specimen: Blood from Arm, Left Updated: 05/21/24 2045     Blood Culture No  growth at 3 days    COVID-19, FLU A/B, RSV PCR 1 HR TAT - Swab, Nasopharynx [[id number]]  (Normal) Collected: 05/13/24 2257    Lab Status: Final result Specimen: Swab from Nasopharynx Updated: 05/13/24 2348     COVID19 Not Detected     Influenza A PCR Not Detected     Influenza B PCR Not Detected     RSV, PCR Not Detected    Narrative:      Fact sheet for providers: https://www.fda.gov/media/014160/download    Fact sheet for patients: https://www.fda.gov/media/991319/download    Test performed by PCR.    Blood Culture - Blood, Hand, Right [[id number]]  (Normal) Collected: 05/13/24 1934    Lab Status: Final result Specimen: Blood from Hand, Right Updated: 05/18/24 1947     Blood Culture No growth at 5 days    Blood Culture - Blood, Hand, Digit Right [[id number]]  (Normal) Collected: 05/13/24 1934    Lab Status: Final result Specimen: Blood from Hand, Digit Right Updated: 05/18/24 1947     Blood Culture No growth at 5 days    Urine Culture - Urine, Urine, Clean Catch [[id number]] Collected: 05/13/24 1756    Lab Status: Final result Specimen: Urine, Clean Catch Updated: 05/15/24 1208     Urine Culture >100,000 CFU/mL Normal Urogenital Rachel    Narrative:      Colonization of the urinary tract without infection is common. Treatment is discouraged unless the patient is symptomatic, pregnant, or undergoing an invasive urologic procedure.          [x]  Radiology  XR Chest 1 View    Result Date: 5/20/2024  Lungs are slightly better aerated than on prior exam, suggesting improving pneumonia or pulmonary edema.   Electronically Signed By-Theo Castaneda MD On:5/20/2024 3:14 PM      XR Abdomen KUB    Result Date: 5/19/2024  Impression: No evidence of stool impaction or obstruction   Electronically Signed By-Darrick Gomez On:5/19/2024 6:19 PM      XR Chest 1 View    Result Date: 5/18/2024  The endotracheal (ET) tube, nasogastric (NG) tube, and right IJ central venous line are thought to be in satisfactory position. Bilateral  airspace opacities are present and may represent infectious multifocal pneumonia. No pneumothorax is seen. Please see above comments for further detail.    Please note that portions of this note were completed with a voice recognition program.     Electronically Signed By-Herberth Arevalo MD On:5/18/2024 8:34 PM      MRI Brain Without Contrast    Result Date: 5/18/2024  Impression:  1. No acute process 2. Parenchymal atrophy and chronic small vessel ischemic white matter changes    Electronically Signed By-Darrick Gomez On:5/18/2024 5:55 PM      US Abdomen Limited    Result Date: 5/16/2024  Impression: Inadequate ascites for safe paracentesis at this time.  Electronically Signed By-FABI DIAZ MD On:5/16/2024 11:58 AM     []  EKG/Telemetry   []  Cardiology/Vascular   []  Pathology  []  Old records  []  Other:    Assessment & Plan   Assessment / Plan     Assessment:  Hepatic encephalopathy  Seizure  Hyperammonemia  S/p mechanical ventilation for airway protection, self extubated on 5/20  Decompensated ROMO cirrhosis  Staph bacteremia, likely contamination  Chronic thrombocytopenia  Chronic anemia  Frontal lobe atrophy  History of GI bleed secondary to peptic ulcer  History of GERD without esophagitis  Essential hypertension  Insulin-dependent type 2 diabetes  Anxiety and depression  Morbid obesity BMI 43    Plan:  Patient currently being managed in medicine service.  Self extubated on 5/20, saturating well on room air.  Patient more awake and alert now.  Likely given he is having multiple bowel movements and ammonia level normal.    Decreasing dose of lactulose to 30 g, 3 times daily.  Continue rifaximin.  Blood culture 5/18 grew gram-positive cocci, staph species not aureus or lugdunensis in 1 set of blood culture;.  Blood culture sent on 5/20, showing no growth at 24 hours.  Likely contaminant.  Currently saturating well on room air.  On Keppra, continue.  EEG on 5/20 showed no epileptiform  discharges.  Neurology and pulmonology following the patient, appreciate input.  Currently on 2 point soft restraints to prevent self-harm and pulling of tubes/lines.  Hemoglobin 9.9, iron panel sent showed showed iron deficiency.  Started on IV iron from today.  Rest of the management as per intensivist.  Currently has bedhold at [place], patient LTC resident there.    Discussed with RN.    DVT prophylaxis:  Medical and mechanical DVT prophylaxis orders are present.        CODE STATUS:   Medical Intervention Limits: NO intubation (DNI)  Level Of Support Discussed With: Next of Kin (If No Surrogate)  Code Status (Patient has no pulse and is not breathing): No CPR (Do Not Attempt to Resuscitate)  Medical Interventions (Patient has pulse or is breathing): Limited Support      Electronically signed by Rinku Gao MD, 05/22/24, 10:49 AM EDT.

## 2024-05-22 NOTE — PLAN OF CARE
Goal Outcome Evaluation:      Patient is a transfer from CCU. Patient is on tube feeds diabeta source currently running at 50ml/hr. HOB at 30 degrees. Patient was anxious and agitated during shift, MD notified and medication administered, see MAR.  at bedside. Patient has had 3 loose bowel movements through the night. Patient in two point restraints. Call light is in reach.

## 2024-05-22 NOTE — NURSING NOTE
MD aware that RN stopped tube feeds during the day on 05/22/2024. MD said to re-evaluate on 05/23/2024 for possible re-start.      Bereket Cruz RN

## 2024-05-22 NOTE — NURSING NOTE
RN spoke with patient's daughter and legal guardian, Nandini, about fecal tube management system. Daughter had no questions. Daughter verbalized understanding. Plan of care was explained to both patient and daughter at bedside.       Bereket Cruz RN

## 2024-05-22 NOTE — PROGRESS NOTES
Nutrition Services    Patient Name: Nic Nicolas  YOB: 1965  MRN: [id number]  Admission date: 5/13/2024    PROGRESS NOTE      Encounter Information: EN Follow Up       PO Diet: NPO Diet NPO Type: Tube Feeding   PO Supplements: NPO   PO Intake:  NPO       Current nutrition support: Diabetisource AC @ 80 ml/hr x 22 hrs  FWF 10 ml q6h (40 ml)  Provides: 2112 kcal, 106 g pro, 1443 ml (1483 ml total)       Estimated Needs: 9896-1994 kcal, 136 g pro, 1700 ml        Nutrition support review: TF rate advancing to goal. Currently running at 50 ml/hr.        Labs (reviewed below): Phos 1.9--replacement ordered. Na+ 135. Pt's Na+ ranging from 135-136 this admission. Pt already on minimal FWF.       GI Function:  Pt is receiving lactulose. Loose BM x 3 noted overnight (5/21)       Nutrition Intervention Updates: Continue advancing TF to goal w/ Phos replacement.      Results from last 7 days   Lab Units 05/22/24 0419 05/21/24 0518 05/20/24  0442   SODIUM mmol/L 135* 135* 136   POTASSIUM mmol/L 3.8 3.9 3.8   CHLORIDE mmol/L 107 104 101   CO2 mmol/L 18.1* 23.0 21.9*   BUN mg/dL 38* 45* 39*   CREATININE mg/dL 1.06 1.22 1.68*   CALCIUM mg/dL 8.2* 8.2* 8.6   BILIRUBIN mg/dL 1.1 1.3* 2.1*   ALK PHOS U/L 75 67 73   ALT (SGPT) U/L 20 18 19   AST (SGOT) U/L 35 36 38   GLUCOSE mg/dL 214* 200* 177*     Results from last 7 days   Lab Units 05/22/24 0419 05/21/24 0518 05/20/24  0442   MAGNESIUM mg/dL 2.4 2.2 2.2   PHOSPHORUS mg/dL 1.9* 2.7 4.6*   HEMOGLOBIN g/dL 9.9* 9.3* 11.0*   HEMATOCRIT % 30.8* 28.6* 33.5*     COVID19   Date Value Ref Range Status   05/18/2024 Not Detected Not Detected - Ref. Range Final     Lab Results   Component Value Date    HGBA1C 7.10 (H) 01/31/2024       RD to follow up per protocol.    Electronically signed by:  Shraddha Amos RD  05/22/24 12:45 EDT

## 2024-05-22 NOTE — PROGRESS NOTES
Pulmonary / Critical Care Progress Note      Patient Name: Nic Nicolas  : 1965  MRN: [id number]  Attending:  Rinku Gao MD   Date of admission: 2024    Subjective   Subjective   Follow-up for altered mental status, seizures, hepatic encephalopathy    Over past 24 hours: Remained on lactulose, rifaximin and Keppra.  Remained altered, was on restraints.  Continued with Cortrak restraint and tube feeds.  Transferred out of ICU.    No acute events overnight.    This morning,  Remains altered, not optimally responsive.  Cortrak in place.  Remains on restraints.  No BM reported  Sedation on hold this a.m.  In bed with head of bed elevated  Will open eyes to painful stimulation  Creatinine 1.06    Objective   Objective     Vitals:   Vital signs for last 24 hours:  Temp:  [97.2 °F (36.2 °C)-98.4 °F (36.9 °C)] 98.4 °F (36.9 °C)  Heart Rate:  [67-77] 77  Resp:  [18-20] 18  BP: (106-154)/(46-90) 140/64    Intake/Output last 3 shifts:  I/O last 3 completed shifts:  In: 2979 [I.V.:960; Other:420; NG/GT:1399; IV Piggyback:200]  Out: 750 [Urine:750]  Intake/Output this shift:  No intake/output data recorded.    Vent settings for last 24 hours:       Hemodynamic parameters for last 24 hours:       Physical Exam   Vital Signs Reviewed  Chronically ill male encephalopathic  Chest: No work of breathing noted, equal rise and fall of chest, clear to auscultation  CV: RRR, no MGR, pulses 2+, equal.  Neuro: Will grimace and open eyes to painful stimuli  Skin: No rashes or lesions noted          Result Review    Result Review:  I have personally reviewed the results from the time of this admission to 2024 07:15 EDT and agree with these findings:  [x]  Laboratory  [x]  Microbiology  [x]  Radiology  [x]  EKG/Telemetry   [x]  Cardiology/Vascular   []  Pathology  []  Old records  []  Other:  Most notable findings include:       Lab 24  0419 24  0518 24  0442 24  0431 24  05/18/24 2010 05/18/24  1819 05/18/24  0459 05/17/24  0451 05/16/24  0503   WBC 5.31 4.97 9.11 4.39  --   --   --   --  4.90 3.88   HEMOGLOBIN 9.9* 9.3* 11.0* 10.2*  --   --   --   --  9.9* 9.9*   HEMATOCRIT 30.8* 28.6* 33.5* 31.3*  --   --   --   --  30.5* 30.4*   PLATELETS 64* 46* 95* 66*  --   --   --   --  65* 70*   SODIUM 135* 135* 136 135* 136  --   --  138 135* 140   SODIUM, ARTERIAL  --   --   --   --   --  133.9* 136.2  --   --   --    POTASSIUM 3.8 3.9 3.8 3.6 3.8  --   --  3.8 3.7 3.4*   CHLORIDE 107 104 101 100 100  --   --  103 102 106   CO2 18.1* 23.0 21.9* 22.1 21.8*  --   --  22.2 21.4* 22.9   BUN 38* 45* 39* 30* 27*  --   --  25* 24* 21*   CREATININE 1.06 1.22 1.68* 1.36* 1.33*  --   --  1.03 1.32* 1.09   GLUCOSE 214* 200* 177* 130* 175*  --   --  118* 170* 135*   GLUCOSE, ARTERIAL  --   --   --   --   --  166* 156*  --   --   --    CALCIUM 8.2* 8.2* 8.6 8.8 8.8  --   --  9.0 8.8 9.1   PHOSPHORUS 1.9* 2.7 4.6* 4.7* 4.3  --   --   --   --   --    TOTAL PROTEIN 5.5* 5.7* 6.2 5.8*  --   --   --   --  5.9* 5.8*   ALBUMIN 2.8* 2.8* 3.2* 3.0*  --   --   --   --  3.0* 2.9*   GLOBULIN 2.7 2.9 3.0 2.8  --   --   --   --  2.9 2.9         Assessment & Plan   Assessment / Plan     Active Hospital Problems:  Active Hospital Problems    Diagnosis     **Hepatic encephalopathy    Seizures  Intubated and placed on ventilator for airway protection  Deep sedation secondary to persistent seizure activity  Hyperammonemia  Hepatic encephalopathy  Decompensated cirrhosis  Hypokalemia  Hyponatremia, clinically insignificant  Acute kidney injury secondary to prerenal etiology     Plan:   Supplemental oxygen if needed to keep sats more than 90%.  Repeat blood culture NGTD x 24.  Initial blood cultures 1 out of 2 with coag negative staph.  Likely contaminant  Continue lactulose 30 g, increased to 4 times daily  Continue rifaximin 550 mg twice daily.  Okay to utilize FMS if needed  Neurology on board  Continue Keppra  EEG  with no epileptiform discharges, slowed background activity compatible with moderate and diffuse encephalopathy  NG tube in place.  Dietitian on board.  Tube feeds and free water recommendations per them  Trend H&H.  Recommend transfusion for Hgb 7 or less.  Platelet count 46.  No active bleeding noted  Renal function improving.  Continue Coreg scheduled with parameters  Trend renal function electrolytes.         DVT prophylaxis:  Medical and mechanical DVT prophylaxis orders are present.    CODE STATUS:   Medical Intervention Limits: NO intubation (DNI)  Level Of Support Discussed With: Next of Kin (If No Surrogate)  Code Status (Patient has no pulse and is not breathing): No CPR (Do Not Attempt to Resuscitate)  Medical Interventions (Patient has pulse or is breathing): Limited Support    I have reviewed labs, imaging, pertinent clinical data and provider notes.   I have discussed with bedside nurse and primary service.     Electronically signed by Abimael Cotton MD, 05/22/24, 7:15 AM EDT.

## 2024-05-23 LAB
A PHAGOCYTOPH DNA BLD QL NAA+PROBE: NEGATIVE
ALBUMIN SERPL-MCNC: 2.9 G/DL (ref 3.5–5.2)
ALBUMIN/GLOB SERPL: 1 G/DL
ALP SERPL-CCNC: 70 U/L (ref 39–117)
ALT SERPL W P-5'-P-CCNC: 17 U/L (ref 1–41)
AMMONIA BLD-SCNC: 59 UMOL/L (ref 16–60)
ANION GAP SERPL CALCULATED.3IONS-SCNC: 11 MMOL/L (ref 5–15)
AST SERPL-CCNC: 30 U/L (ref 1–40)
B HENSELAE IGG TITR SER IF: NEGATIVE TITER
B HENSELAE IGM TITR SER IF: NEGATIVE TITER
B QUINTANA IGG TITR SER IF: NEGATIVE TITER
B QUINTANA IGM TITR SER IF: NEGATIVE TITER
BACTERIA SPEC AEROBE CULT: NORMAL
BASOPHILS # BLD AUTO: 0.04 10*3/MM3 (ref 0–0.2)
BASOPHILS NFR BLD AUTO: 1 % (ref 0–1.5)
BILIRUB SERPL-MCNC: 1.1 MG/DL (ref 0–1.2)
BUN SERPL-MCNC: 34 MG/DL (ref 6–20)
BUN/CREAT SERPL: 43.6 (ref 7–25)
CALCIUM SPEC-SCNC: 8.5 MG/DL (ref 8.6–10.5)
CHLORIDE SERPL-SCNC: 110 MMOL/L (ref 98–107)
CO2 SERPL-SCNC: 21 MMOL/L (ref 22–29)
CREAT SERPL-MCNC: 0.78 MG/DL (ref 0.76–1.27)
DEPRECATED RDW RBC AUTO: 62.9 FL (ref 37–54)
E CHAFFEENSIS DNA BLD QL NAA+PROBE: NEGATIVE
EGFRCR SERPLBLD CKD-EPI 2021: 103.4 ML/MIN/1.73
EOSINOPHIL # BLD AUTO: 0.15 10*3/MM3 (ref 0–0.4)
EOSINOPHIL NFR BLD AUTO: 3.7 % (ref 0.3–6.2)
ERYTHROCYTE [DISTWIDTH] IN BLOOD BY AUTOMATED COUNT: 19.4 % (ref 12.3–15.4)
GLOBULIN UR ELPH-MCNC: 3 GM/DL
GLUCOSE BLDC GLUCOMTR-MCNC: 151 MG/DL (ref 70–99)
GLUCOSE BLDC GLUCOMTR-MCNC: 166 MG/DL (ref 70–99)
GLUCOSE BLDC GLUCOMTR-MCNC: 179 MG/DL (ref 70–99)
GLUCOSE BLDC GLUCOMTR-MCNC: 186 MG/DL (ref 70–99)
GLUCOSE BLDC GLUCOMTR-MCNC: 198 MG/DL (ref 70–99)
GLUCOSE BLDC GLUCOMTR-MCNC: 198 MG/DL (ref 70–99)
GLUCOSE BLDC GLUCOMTR-MCNC: 204 MG/DL (ref 70–99)
GLUCOSE BLDC GLUCOMTR-MCNC: 216 MG/DL (ref 70–99)
GLUCOSE BLDC GLUCOMTR-MCNC: 231 MG/DL (ref 70–99)
GLUCOSE SERPL-MCNC: 166 MG/DL (ref 65–99)
HCT VFR BLD AUTO: 30.5 % (ref 37.5–51)
HGB BLD-MCNC: 9.5 G/DL (ref 13–17.7)
IMM GRANULOCYTES # BLD AUTO: 0.02 10*3/MM3 (ref 0–0.05)
IMM GRANULOCYTES NFR BLD AUTO: 0.5 % (ref 0–0.5)
LYMPHOCYTES # BLD AUTO: 0.76 10*3/MM3 (ref 0.7–3.1)
LYMPHOCYTES NFR BLD AUTO: 18.8 % (ref 19.6–45.3)
MAGNESIUM SERPL-MCNC: 2.4 MG/DL (ref 1.6–2.6)
MCH RBC QN AUTO: 28.2 PG (ref 26.6–33)
MCHC RBC AUTO-ENTMCNC: 31.1 G/DL (ref 31.5–35.7)
MCV RBC AUTO: 90.5 FL (ref 79–97)
MONOCYTES # BLD AUTO: 0.48 10*3/MM3 (ref 0.1–0.9)
MONOCYTES NFR BLD AUTO: 11.9 % (ref 5–12)
NEUTROPHILS NFR BLD AUTO: 2.6 10*3/MM3 (ref 1.7–7)
NEUTROPHILS NFR BLD AUTO: 64.1 % (ref 42.7–76)
NRBC BLD AUTO-RTO: 0 /100 WBC (ref 0–0.2)
PHOSPHATE SERPL-MCNC: 2.3 MG/DL (ref 2.5–4.5)
PLATELET # BLD AUTO: 65 10*3/MM3 (ref 140–450)
PMV BLD AUTO: 10.7 FL (ref 6–12)
POTASSIUM SERPL-SCNC: 3.8 MMOL/L (ref 3.5–5.2)
PROT SERPL-MCNC: 5.9 G/DL (ref 6–8.5)
RBC # BLD AUTO: 3.37 10*6/MM3 (ref 4.14–5.8)
SARS-COV-2 AB SERPL-IMP: POSITIVE
SARS-COV-2 IGG SERPL IA-ACNC: 28.9 AU/ML
SODIUM SERPL-SCNC: 142 MMOL/L (ref 136–145)
WBC NRBC COR # BLD AUTO: 4.05 10*3/MM3 (ref 3.4–10.8)

## 2024-05-23 PROCEDURE — 92610 EVALUATE SWALLOWING FUNCTION: CPT

## 2024-05-23 PROCEDURE — 80053 COMPREHEN METABOLIC PANEL: CPT | Performed by: PHYSICIAN ASSISTANT

## 2024-05-23 PROCEDURE — 25010000002 THIAMINE HCL 200 MG/2ML SOLUTION: Performed by: PSYCHIATRY & NEUROLOGY

## 2024-05-23 PROCEDURE — 0 DEXTROSE 5 % SOLUTION: Performed by: STUDENT IN AN ORGANIZED HEALTH CARE EDUCATION/TRAINING PROGRAM

## 2024-05-23 PROCEDURE — 25010000002 LEVETIRACETAM IN NACL 0.82% 500 MG/100ML SOLUTION: Performed by: PSYCHIATRY & NEUROLOGY

## 2024-05-23 PROCEDURE — 84100 ASSAY OF PHOSPHORUS: CPT | Performed by: STUDENT IN AN ORGANIZED HEALTH CARE EDUCATION/TRAINING PROGRAM

## 2024-05-23 PROCEDURE — 25010000002 NA FERRIC GLUC CPLX PER 12.5 MG: Performed by: STUDENT IN AN ORGANIZED HEALTH CARE EDUCATION/TRAINING PROGRAM

## 2024-05-23 PROCEDURE — 99232 SBSQ HOSP IP/OBS MODERATE 35: CPT | Performed by: STUDENT IN AN ORGANIZED HEALTH CARE EDUCATION/TRAINING PROGRAM

## 2024-05-23 PROCEDURE — 85025 COMPLETE CBC W/AUTO DIFF WBC: CPT | Performed by: STUDENT IN AN ORGANIZED HEALTH CARE EDUCATION/TRAINING PROGRAM

## 2024-05-23 PROCEDURE — 94799 UNLISTED PULMONARY SVC/PX: CPT

## 2024-05-23 PROCEDURE — 82140 ASSAY OF AMMONIA: CPT | Performed by: STUDENT IN AN ORGANIZED HEALTH CARE EDUCATION/TRAINING PROGRAM

## 2024-05-23 PROCEDURE — 82948 REAGENT STRIP/BLOOD GLUCOSE: CPT | Performed by: INTERNAL MEDICINE

## 2024-05-23 PROCEDURE — 82948 REAGENT STRIP/BLOOD GLUCOSE: CPT

## 2024-05-23 PROCEDURE — 99233 SBSQ HOSP IP/OBS HIGH 50: CPT | Performed by: INTERNAL MEDICINE

## 2024-05-23 PROCEDURE — 83735 ASSAY OF MAGNESIUM: CPT | Performed by: STUDENT IN AN ORGANIZED HEALTH CARE EDUCATION/TRAINING PROGRAM

## 2024-05-23 PROCEDURE — 63710000001 INSULIN LISPRO (HUMAN) PER 5 UNITS: Performed by: INTERNAL MEDICINE

## 2024-05-23 RX ADMIN — BUSPIRONE HYDROCHLORIDE 7.5 MG: 5 TABLET ORAL at 20:50

## 2024-05-23 RX ADMIN — SERTRALINE HYDROCHLORIDE 100 MG: 100 TABLET, FILM COATED ORAL at 20:50

## 2024-05-23 RX ADMIN — SUCRALFATE 1 G: 1 TABLET ORAL at 10:29

## 2024-05-23 RX ADMIN — SUCRALFATE 1 G: 1 TABLET ORAL at 20:50

## 2024-05-23 RX ADMIN — LEVETIRACETAM 500 MG: 5 INJECTION, SOLUTION INTRAVENOUS at 23:33

## 2024-05-23 RX ADMIN — RIFAXIMIN 550 MG: 550 TABLET ORAL at 10:29

## 2024-05-23 RX ADMIN — CARVEDILOL 6.25 MG: 6.25 TABLET, FILM COATED ORAL at 10:29

## 2024-05-23 RX ADMIN — SODIUM CHLORIDE 125 MG: 9 INJECTION, SOLUTION INTRAVENOUS at 10:29

## 2024-05-23 RX ADMIN — INSULIN LISPRO 3 UNITS: 100 INJECTION, SOLUTION INTRAVENOUS; SUBCUTANEOUS at 14:33

## 2024-05-23 RX ADMIN — BUSPIRONE HYDROCHLORIDE 7.5 MG: 5 TABLET ORAL at 10:29

## 2024-05-23 RX ADMIN — LEVETIRACETAM 500 MG: 5 INJECTION, SOLUTION INTRAVENOUS at 11:52

## 2024-05-23 RX ADMIN — INSULIN LISPRO 3 UNITS: 100 INJECTION, SOLUTION INTRAVENOUS; SUBCUTANEOUS at 17:48

## 2024-05-23 RX ADMIN — THIAMINE HYDROCHLORIDE 100 MG: 100 INJECTION, SOLUTION INTRAMUSCULAR; INTRAVENOUS at 11:52

## 2024-05-23 RX ADMIN — HYDROCORTISONE 1 APPLICATION: 1 OINTMENT TOPICAL at 15:39

## 2024-05-23 RX ADMIN — PANTOPRAZOLE SODIUM 40 MG: 40 INJECTION, POWDER, FOR SOLUTION INTRAVENOUS at 05:05

## 2024-05-23 RX ADMIN — LEVETIRACETAM 500 MG: 5 INJECTION, SOLUTION INTRAVENOUS at 18:44

## 2024-05-23 RX ADMIN — Medication 1000 UNITS: at 10:29

## 2024-05-23 RX ADMIN — LEVETIRACETAM 500 MG: 5 INJECTION, SOLUTION INTRAVENOUS at 05:35

## 2024-05-23 RX ADMIN — LACTULOSE 30 G: 20 SOLUTION ORAL at 01:42

## 2024-05-23 RX ADMIN — RIFAXIMIN 550 MG: 550 TABLET ORAL at 20:50

## 2024-05-23 RX ADMIN — LACTULOSE 30 G: 20 SOLUTION ORAL at 20:50

## 2024-05-23 RX ADMIN — CARVEDILOL 6.25 MG: 6.25 TABLET, FILM COATED ORAL at 17:47

## 2024-05-23 RX ADMIN — BUSPIRONE HYDROCHLORIDE 7.5 MG: 5 TABLET ORAL at 15:39

## 2024-05-23 RX ADMIN — HYDROCORTISONE 1 APPLICATION: 1 OINTMENT TOPICAL at 10:29

## 2024-05-23 RX ADMIN — THIAMINE HYDROCHLORIDE 100 MG: 100 INJECTION, SOLUTION INTRAMUSCULAR; INTRAVENOUS at 20:50

## 2024-05-23 RX ADMIN — SODIUM PHOSPHATE, MONOBASIC, MONOHYDRATE AND SODIUM PHOSPHATE, DIBASIC, ANHYDROUS 15 MMOL: 142; 276 INJECTION, SOLUTION INTRAVENOUS at 12:10

## 2024-05-23 RX ADMIN — HYDROCORTISONE 1 APPLICATION: 1 OINTMENT TOPICAL at 22:12

## 2024-05-23 RX ADMIN — THIAMINE HYDROCHLORIDE 100 MG: 100 INJECTION, SOLUTION INTRAMUSCULAR; INTRAVENOUS at 03:31

## 2024-05-23 RX ADMIN — Medication 10 ML: at 20:51

## 2024-05-23 RX ADMIN — INSULIN LISPRO 2 UNITS: 100 INJECTION, SOLUTION INTRAVENOUS; SUBCUTANEOUS at 20:50

## 2024-05-23 RX ADMIN — SODIUM PHOSPHATE, MONOBASIC, MONOHYDRATE AND SODIUM PHOSPHATE, DIBASIC, ANHYDROUS 15 MMOL: 142; 276 INJECTION, SOLUTION INTRAVENOUS at 15:11

## 2024-05-23 RX ADMIN — SUCRALFATE 1 G: 1 TABLET ORAL at 17:47

## 2024-05-23 RX ADMIN — LACTULOSE 30 G: 20 SOLUTION ORAL at 14:33

## 2024-05-23 RX ADMIN — INSULIN LISPRO 2 UNITS: 100 INJECTION, SOLUTION INTRAVENOUS; SUBCUTANEOUS at 10:33

## 2024-05-23 RX ADMIN — LACTULOSE 30 G: 20 SOLUTION ORAL at 10:30
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
## 2024-05-23 NOTE — SIGNIFICANT NOTE
05/23/24 1100   Physical Therapy Time and Intention   Comment, Session Not Performed Pt is a LTC resident and depnedent at baseline.  Recommend return once medically stable.

## 2024-05-23 NOTE — PLAN OF CARE
Goal Outcome Evaluation:   Patient remains in restraints and confused, updated legal guardian Nandini Nicolas. No complaints at this time. Call light in reach. Fecal management system remains in place.

## 2024-05-23 NOTE — PROGRESS NOTES
Pulmonary / Critical Care Progress Note      Patient Name: Nic Nicolas  : 1965  MRN: [id number]  Attending:  Rinku Gao MD   Date of admission: 2024    Subjective   Subjective   Follow-up for altered mental status, seizures, hepatic encephalopathy    Over past 24 hours: Remained on lactulose, rifaximin and Keppra.  Remained altered, was on restraints.  Continued with Cortrak, restraint and tube feeds.  Increased dose of lactulose to 30 g 4 times daily.    No acute events overnight.    This morning,  Remains altered, not optimally responsive.  More awake and following commands.  Does not know where he is at.  Cortrak in place.  Remains on restraints.  Flexi-Seal remains in place.  Creatinine 0.78    Objective   Objective     Vitals:   Vital signs for last 24 hours:  Temp:  [98.1 °F (36.7 °C)-99 °F (37.2 °C)] 98.2 °F (36.8 °C)  Heart Rate:  [75-83] 75  Resp:  [18-22] 18  BP: (134-160)/(63-77) 142/63    Intake/Output last 3 shifts:  I/O last 3 completed shifts:  In: 1629 [I.V.:240; Other:420; NG/GT:769; IV Piggyback:200]  Out: 500 [Stool:500]  Intake/Output this shift:  No intake/output data recorded.    Vent settings for last 24 hours:       Hemodynamic parameters for last 24 hours:       Physical Exam   Vital Signs Reviewed  Chronically ill male encephalopathic, alert to self  Chest: No work of breathing noted, equal rise and fall of chest, clear to auscultation  CV: RRR, no MGR, pulses 2+, equal.  Neuro: Alert to self, follows some commands, generalized weakness, cranial nerves II to XII are intact  Skin: No rashes or lesions noted          Result Review    Result Review:  I have personally reviewed the results from the time of this admission to 2024 07:01 EDT and agree with these findings:  [x]  Laboratory  [x]  Microbiology  [x]  Radiology  [x]  EKG/Telemetry   [x]  Cardiology/Vascular   []  Pathology  []  Old records  []  Other:  Most notable findings include:       Lab 24  1999  05/22/24 0419 05/21/24 0518 05/20/24 0442 05/19/24 0431 05/18/24 2046 05/18/24 2010 05/18/24 1819 05/18/24 0459 05/17/24 0451   WBC 4.05 5.31 4.97 9.11 4.39  --   --   --   --  4.90   HEMOGLOBIN 9.5* 9.9* 9.3* 11.0* 10.2*  --   --   --   --  9.9*   HEMATOCRIT 30.5* 30.8* 28.6* 33.5* 31.3*  --   --   --   --  30.5*   PLATELETS 65* 64* 46* 95* 66*  --   --   --   --  65*   SODIUM 142 135* 135* 136 135* 136  --   --  138 135*   SODIUM, ARTERIAL  --   --   --   --   --   --  133.9*   < >  --   --    POTASSIUM 3.8 3.8 3.9 3.8 3.6 3.8  --   --  3.8 3.7   CHLORIDE 110* 107 104 101 100 100  --   --  103 102   CO2 21.0* 18.1* 23.0 21.9* 22.1 21.8*  --   --  22.2 21.4*   BUN 34* 38* 45* 39* 30* 27*  --   --  25* 24*   CREATININE 0.78 1.06 1.22 1.68* 1.36* 1.33*  --   --  1.03 1.32*   GLUCOSE 166* 214* 200* 177* 130* 175*  --   --  118* 170*   GLUCOSE, ARTERIAL  --   --   --   --   --   --  166*   < >  --   --    CALCIUM 8.5* 8.2* 8.2* 8.6 8.8 8.8  --   --  9.0 8.8   PHOSPHORUS 2.3* 1.9* 2.7 4.6* 4.7* 4.3  --   --   --   --    TOTAL PROTEIN 5.9* 5.5* 5.7* 6.2 5.8*  --   --   --   --  5.9*   ALBUMIN 2.9* 2.8* 2.8* 3.2* 3.0*  --   --   --   --  3.0*   GLOBULIN 3.0 2.7 2.9 3.0 2.8  --   --   --   --  2.9    < > = values in this interval not displayed.         Assessment & Plan   Assessment / Plan     Active Hospital Problems:  Active Hospital Problems    Diagnosis     **Hepatic encephalopathy    Seizures  Intubated and placed on ventilator for airway protection  Deep sedation secondary to persistent seizure activity  Hyperammonemia  Hepatic encephalopathy  Decompensated cirrhosis  Hypokalemia  Hyponatremia, clinically insignificant  Acute kidney injury secondary to prerenal etiology     Plan:   Supplemental oxygen if needed to keep sats more than 90%.  Repeat blood culture NGTD x 24.  Initial blood cultures 1 out of 2 with coag negative staph.  Likely contaminant  Continue lactulose 30 g, increased to 4 times  daily  Continue rifaximin 550 mg twice daily.  Continue with fecal management system for now.  Neurology on board  Continue Keppra  EEG with no epileptiform discharges, slowed background activity compatible with moderate and diffuse encephalopathy  NG tube in place.  Dietitian on board.  Tube feeds and free water recommendations per them  Trend H&H.  Recommend transfusion for Hgb 7 or less.  Platelet count 46.  No active bleeding noted  Continue Coreg scheduled with parameters  Trend renal function electrolytes.  Serum creatinine improving.       DVT prophylaxis:  Medical and mechanical DVT prophylaxis orders are present.    CODE STATUS:   Medical Intervention Limits: NO intubation (DNI)  Level Of Support Discussed With: Next of Kin (If No Surrogate)  Code Status (Patient has no pulse and is not breathing): No CPR (Do Not Attempt to Resuscitate)  Medical Interventions (Patient has pulse or is breathing): Limited Support    I have reviewed labs, imaging, pertinent clinical data and provider notes.   I have discussed with bedside nurse and primary service.     Electronically signed by Abimael Cotton MD, 05/23/24, 7:01 AM EDT.

## 2024-05-23 NOTE — PROGRESS NOTES
Nutrition Services    Patient Name: Nic Nicolas  YOB: 1965  MRN: [id number]  Admission date: 5/13/2024    PROGRESS NOTE      Encounter Information: EN Follow Up       PO Diet: NPO Diet NPO Type: Tube Feeding   PO Supplements: NPO   PO Intake:  NPO       Current nutrition support: Diabetisource AC @ 80 ml/hr x 22 hrs  FWF 10 ml q6h (40 ml)  Provides: 2112 kcal, 106 g pro, 1443 ml (1483 ml total)       Estimated Needs: 5392-2116 kcal, 136 g pro, 1700 ml        Nutrition support review: TF rate advancing to goal. Currently running at 75 ml/hr.        Labs (reviewed below): Phos improving at 2.3--replaced yesterday.   Hyponatremia corrected.       GI Function:  Continues on lactulose. Large BM noted 5/23, large; liquid; soft       Nutrition Intervention Updates: Continue advancing TF to goal w/ Phos replacement.      Results from last 7 days   Lab Units 05/23/24 0425 05/22/24 0419 05/21/24  0518   SODIUM mmol/L 142 135* 135*   POTASSIUM mmol/L 3.8 3.8 3.9   CHLORIDE mmol/L 110* 107 104   CO2 mmol/L 21.0* 18.1* 23.0   BUN mg/dL 34* 38* 45*   CREATININE mg/dL 0.78 1.06 1.22   CALCIUM mg/dL 8.5* 8.2* 8.2*   BILIRUBIN mg/dL 1.1 1.1 1.3*   ALK PHOS U/L 70 75 67   ALT (SGPT) U/L 17 20 18   AST (SGOT) U/L 30 35 36   GLUCOSE mg/dL 166* 214* 200*     Results from last 7 days   Lab Units 05/23/24  0425 05/22/24 0419 05/21/24  0518   MAGNESIUM mg/dL 2.4 2.4 2.2   PHOSPHORUS mg/dL 2.3* 1.9* 2.7   HEMOGLOBIN g/dL 9.5* 9.9* 9.3*   HEMATOCRIT % 30.5* 30.8* 28.6*     COVID19   Date Value Ref Range Status   05/18/2024 Not Detected Not Detected - Ref. Range Final     Lab Results   Component Value Date    HGBA1C 7.10 (H) 01/31/2024       RD to follow up per protocol.    Electronically signed by:  Sangeeta Gong RD  05/23/24 07:37 EDT

## 2024-05-23 NOTE — PLAN OF CARE
Goal Outcome Evaluation:         Lactulose, FMC, restraints.  More alert this morning. Answering some questions appropriately.

## 2024-05-23 NOTE — PROGRESS NOTES
Respiratory Therapist Broncho-Pulmonary Hygiene Progress Note      Patient Name:  Nic Nicolas  YOB: 1965    Nic Nicolas meets the qualification for Level 2 of the Bronco-Pulmonary Hygiene Protocol. This was based on my daily patient assessment and includes review of chest x-ray results, cough ability and quality, oxygenation, secretions or risk for secretion development and patient mobility.     Broncho-Pulmonary Hygiene Assessment:    Level of Movement: Low level of mobility  Lethargic uncoorperative    Breath Sounds: Diminished and/or coarse rhonchi    Cough: Ineffective, weak, frequent    Chest X-Ray: Possible signs of consolidation and/or atelectasis or clear.     Sputum Productions: None or small amount of thin or watery secretions with effective cough    History and Physical: Chronic condition    SpO2 to Oxygen Need: greater than 92% on room air or  less than 3L nasal canula    Current SpO2 is: 97 on room air    Based on this information, I have completed the following interventions: Aerobika with bronchodialtor medication or TID      Electronically signed by Joana Pedroza RRT, 05/23/24, 11:00 AM EDT.

## 2024-05-23 NOTE — PROGRESS NOTES
[facility]   Hospitalist Progress Note  Date: 2024  Patient Name: Nic Nicolas  : 1965  MRN: [id number]  Date of admission: 2024  Room/Bed: 260/1      Subjective   Subjective     Chief Complaint: Follow up for confusion     Summary:Nic Nicolas is a 59 y.o. male th cirrhosis of the liver due to ROMO can have a hepatic encephalopathy, DVT in the past, diabetes on insulin, hypertension, obstructive sleep apnea, TBI, and asthma/COPD who presents with confusion. VSS. CBC shows no leukocytosis but does show a hemoglobin of 10.7 which is above his baseline.  CMP shows a slightly elevated bilirubin at 2.4.  Ammonia level is 96.  UA shows moderate blood, positive nitrite, small leuk esterase trace bacteria.  CT of the head shows global/frontal lobe atrophy.  Patient was given 2 g of ceftriaxone and started on lactulose q4 hours.  Gradual improvement in mentation.  Ammonia normalized.  No ascites on ultrasound.  Completed Rocephin.  Had CODE BLUE called after getting MRI of the brain where he became cyanotic with agonal breathing but never lost a pulse.  Had multiple seizures and had to be intubated for airway protection.  On Keppra for seizures.  Patient was started on lactulose.  Was on propofol/fentanyl drip for sedation due to seizure activities.  Patient self extubated on .  Saturating in room air thereafter.  Was also on Levophed for pressor support.  Eventually patient was weaned off Levophed and blood pressure has been stable.  Sedation was discontinued.  Also had multiple bowel movements after which she was more awake and alert.  He was transferred out of critical care unit on .  Mentation improving.  Patient still on restraints to prevent him from pulling out the core track.    Interval Followup: No acute event overnight.  Patient more awake and alert today.  Oriented x 3, but still confused. Saturating well on room air.  Still receiving tube feeding via core track.  Having  multiple bowel movements.  Ammonia level normal.  Speech/swallow evaluation consult placed.     Review of Systems    All systems reviewed and negative except for what is outlined above.      Objective   Objective     Vitals:   Temp:  [98.1 °F (36.7 °C)-99 °F (37.2 °C)] 98.2 °F (36.8 °C)  Heart Rate:  [75-83] 75  Resp:  [18-20] 18  BP: (142-160)/(63-76) 142/63    Physical Exam   General: Awake and alert, follow commands.  Confused. NAD  Cardiovascular: RRR, no murmurs   Pulmonary: CTA bilaterally; no wheezes; no conversational dyspnea  Gastrointestinal: S/ND/NT, +BS  Neuro: Awake, alert and oriented x 3 but intermittently confused.      Result Review    Result Review:  I have personally reviewed these results:  [x]  Laboratory      Lab 05/23/24  0425 05/22/24  0419 05/21/24  0518 05/19/24  0431 05/19/24  0000 05/18/24 2046 05/18/24 2010   WBC 4.05 5.31 4.97   < >  --   --   --    HEMOGLOBIN 9.5* 9.9* 9.3*   < >  --   --   --    HEMATOCRIT 30.5* 30.8* 28.6*   < >  --   --   --    PLATELETS 65* 64* 46*   < >  --   --   --    NEUTROS ABS 2.60 3.74  --   --   --   --   --    IMMATURE GRANS (ABS) 0.02 0.01  --   --   --   --   --    LYMPHS ABS 0.76 0.63*  --   --   --   --   --    MONOS ABS 0.48 0.76  --   --   --   --   --    EOS ABS 0.15 0.15  --   --   --   --   --    MCV 90.5 89.0 87.5   < >  --   --   --    CRP  --   --   --   --   --  <0.30  --    LACTATE  --   --   --   --  1.5 3.2*  --    LACTATE, ARTERIAL  --   --   --   --   --   --  3.62*   D DIMER QUANT  --   --   --   --   --  1.22*  --     < > = values in this interval not displayed.         Lab 05/23/24  0425 05/22/24  0419 05/21/24 0518 05/18/24 2046 05/18/24 2010 05/18/24  1819   SODIUM 142 135* 135*   < >  --   --    SODIUM, ARTERIAL  --   --   --   --  133.9* 136.2   POTASSIUM 3.8 3.8 3.9   < >  --   --    CHLORIDE 110* 107 104   < >  --   --    CO2 21.0* 18.1* 23.0   < >  --   --    ANION GAP 11.0 9.9 8.0   < >  --   --    BUN 34* 38* 45*   < >   --   --    CREATININE 0.78 1.06 1.22   < >  --   --    EGFR 103.4 81.3 68.7   < >  --   --    GLUCOSE 166* 214* 200*   < >  --   --    GLUCOSE, ARTERIAL  --   --   --   --  166* 156*   CALCIUM 8.5* 8.2* 8.2*   < >  --   --    IONIZED CALCIUM  --   --   --   --  1.12* 1.15   MAGNESIUM 2.4 2.4 2.2   < >  --   --    PHOSPHORUS 2.3* 1.9* 2.7   < >  --   --     < > = values in this interval not displayed.         Lab 05/23/24  0425 05/22/24 0419 05/21/24  0518   TOTAL PROTEIN 5.9* 5.5* 5.7*   ALBUMIN 2.9* 2.8* 2.8*   GLOBULIN 3.0 2.7 2.9   ALT (SGPT) 17 20 18   AST (SGOT) 30 35 36   BILIRUBIN 1.1 1.1 1.3*   ALK PHOS 70 75 67                 Lab 05/22/24 0419 05/21/24  0518   IRON  --  35*   IRON SATURATION (TSAT)  --  11*   TIBC  --  326   TRANSFERRIN  --  219   FERRITIN 67.40  --          Lab 05/18/24 2010 05/18/24  1819   PH, ARTERIAL 7.268* 7.243*   PCO2, ARTERIAL 45.7* 35.9   PO2 .0* 204.7*   O2 SATURATION ART 97.6 98.9   FIO2 40 100   HCO3 ART 20.4* 15.1*   BASE EXCESS ART -6.4* -11.3*   CARBOXYHEMOGLOBIN 0.2 0.5     Brief Urine Lab Results  (Last result in the past 365 days)        Color   Clarity   Blood   Leuk Est   Nitrite   Protein   CREAT   Urine HCG        05/18/24 2110 Yellow   Clear   Negative   Negative   Negative   Negative                 [x]  Microbiology   Microbiology Results (last 10 days)       Procedure Component Value - Date/Time    Blood Culture - Blood, Arm, Right [[id number]]  (Normal) Collected: 05/20/24 1220    Lab Status: Preliminary result Specimen: Blood from Arm, Right Updated: 05/23/24 0700     Blood Culture No growth at 2 days    Blood Culture - Blood, Hand, Left [[id number]]  (Normal) Collected: 05/20/24 1206    Lab Status: Preliminary result Specimen: Blood from Hand, Left Updated: 05/22/24 1245     Blood Culture No growth at 2 days    Respiratory Culture - Sputum, ET Suction [[id number]] Collected: 05/18/24 2126    Lab Status: Final result Specimen: Sputum from ET Suction  Updated: 05/20/24 1056     Respiratory Culture Scant growth (1+) Normal respiratory marion. No S. aureus or Pseudomonas aeruginosa detected. Final report.     Gram Stain Moderate (3+) WBCs seen      Moderate (3+) Epithelial cells seen      Few (2+) Gram positive cocci in pairs, chains and clusters    MRSA Screen, PCR (Inpatient) - Swab, Nares [[id number]]  (Normal) Collected: 05/18/24 2112    Lab Status: Final result Specimen: Swab from Nares Updated: 05/18/24 2235     MRSA PCR No MRSA Detected    Narrative:      The negative predictive value of this diagnostic test is high and should only be used to consider de-escalating anti-MRSA therapy. A positive result may indicate colonization with MRSA and must be correlated clinically.    Respiratory Panel PCR w/COVID-19(SARS-CoV-2) BRENDAN/LE/CATRACHITA/PAD/COR/ALFREDO In-House, NP Swab in UTM/VTM, 2 HR TAT - Swab, Nasopharynx [[id number]]  (Normal) Collected: 05/18/24 2112    Lab Status: Final result Specimen: Swab from Nasopharynx Updated: 05/19/24 0655     ADENOVIRUS, PCR Not Detected     Coronavirus 229E Not Detected     Coronavirus HKU1 Not Detected     Coronavirus NL63 Not Detected     Coronavirus OC43 Not Detected     COVID19 Not Detected     Human Metapneumovirus Not Detected     Human Rhinovirus/Enterovirus Not Detected     Influenza A PCR Not Detected     Influenza B PCR Not Detected     Parainfluenza Virus 1 Not Detected     Parainfluenza Virus 2 Not Detected     Parainfluenza Virus 3 Not Detected     Parainfluenza Virus 4 Not Detected     RSV, PCR Not Detected     Bordetella pertussis pcr Not Detected     Bordetella parapertussis PCR Not Detected     Chlamydophila pneumoniae PCR Not Detected     Mycoplasma pneumo by PCR Not Detected    Narrative:      In the setting of a positive respiratory panel with a viral infection PLUS a negative procalcitonin without other underlying concern for bacterial infection, consider observing off antibiotics or discontinuation of antibiotics  and continue supportive care. If the respiratory panel is positive for atypical bacterial infection (Bordetella pertussis, Chlamydophila pneumoniae, or Mycoplasma pneumoniae), consider antibiotic de-escalation to target atypical bacterial infection.    S. Pneumo Ag Urine or CSF - Urine, Urine, Clean Catch [[id number]]  (Normal) Collected: 05/18/24 2110    Lab Status: Final result Specimen: Urine, Clean Catch Updated: 05/18/24 2151     Strep Pneumo Ag Negative    Legionella Antigen, Urine - Urine, Urine, Clean Catch [[id number]]  (Normal) Collected: 05/18/24 2110    Lab Status: Final result Specimen: Urine, Clean Catch Updated: 05/18/24 2151     LEGIONELLA ANTIGEN, URINE Negative    Blood Culture - Blood, Arm, Left [[id number]]  (Abnormal) Collected: 05/18/24 2046    Lab Status: Edited Result - FINAL Specimen: Blood from Arm, Left Updated: 05/22/24 0706     Blood Culture Staphylococcus, coagulase negative     Isolated from Aerobic and Anaerobic Bottles     Gram Stain Aerobic Bottle Gram positive cocci in clusters      Anaerobic Bottle Gram positive cocci in clusters     Comment: Appended report. These results have been appended to a previously final verified report.       Narrative:      Probable contaminant requires clinical correlation, susceptibility not performed unless requested by physician.      Blood Culture ID, PCR - Blood, Arm, Left [[id number]]  (Abnormal) Collected: 05/18/24 2046    Lab Status: Final result Specimen: Blood from Arm, Left Updated: 05/19/24 2001     BCID, PCR Staph spp, not aureus or lugdunensis. Identification by BCID2 PCR.     BOTTLE TYPE Aerobic Bottle    Blood Culture - Blood, Arm, Left [[id number]]  (Normal) Collected: 05/18/24 2025    Lab Status: Preliminary result Specimen: Blood from Arm, Left Updated: 05/22/24 2045     Blood Culture No growth at 4 days    COVID-19, FLU A/B, RSV PCR 1 HR TAT - Swab, Nasopharynx [[id number]]  (Normal) Collected: 05/13/24 2257    Lab Status: Final result  Specimen: Swab from Nasopharynx Updated: 05/13/24 2348     COVID19 Not Detected     Influenza A PCR Not Detected     Influenza B PCR Not Detected     RSV, PCR Not Detected    Narrative:      Fact sheet for providers: https://www.fda.gov/media/902982/download    Fact sheet for patients: https://www.fda.gov/media/378606/download    Test performed by PCR.    Blood Culture - Blood, Hand, Right [[id number]]  (Normal) Collected: 05/13/24 1934    Lab Status: Final result Specimen: Blood from Hand, Right Updated: 05/18/24 1947     Blood Culture No growth at 5 days    Blood Culture - Blood, Hand, Digit Right [[id number]]  (Normal) Collected: 05/13/24 1934    Lab Status: Final result Specimen: Blood from Hand, Digit Right Updated: 05/18/24 1947     Blood Culture No growth at 5 days    Urine Culture - Urine, Urine, Clean Catch [[id number]] Collected: 05/13/24 1756    Lab Status: Final result Specimen: Urine, Clean Catch Updated: 05/15/24 1208     Urine Culture >100,000 CFU/mL Normal Urogenital Rachel    Narrative:      Colonization of the urinary tract without infection is common. Treatment is discouraged unless the patient is symptomatic, pregnant, or undergoing an invasive urologic procedure.          [x]  Radiology  XR Chest 1 View    Result Date: 5/20/2024  Lungs are slightly better aerated than on prior exam, suggesting improving pneumonia or pulmonary edema.   Electronically Signed By-Theo Castaneda MD On:5/20/2024 3:14 PM      XR Abdomen KUB    Result Date: 5/19/2024  Impression: No evidence of stool impaction or obstruction   Electronically Signed By-Darrick Gomez On:5/19/2024 6:19 PM      XR Chest 1 View    Result Date: 5/18/2024  The endotracheal (ET) tube, nasogastric (NG) tube, and right IJ central venous line are thought to be in satisfactory position. Bilateral airspace opacities are present and may represent infectious multifocal pneumonia. No pneumothorax is seen. Please see above comments for further detail.     Please note that portions of this note were completed with a voice recognition program.     Electronically Signed By-Herberth Arevalo MD On:5/18/2024 8:34 PM      MRI Brain Without Contrast    Result Date: 5/18/2024  Impression:  1. No acute process 2. Parenchymal atrophy and chronic small vessel ischemic white matter changes    Electronically Signed By-Darrick Gomez On:5/18/2024 5:55 PM      US Abdomen Limited    Result Date: 5/16/2024  Impression: Inadequate ascites for safe paracentesis at this time.  Electronically Signed By-FABI DIAZ MD On:5/16/2024 11:58 AM     []  EKG/Telemetry   []  Cardiology/Vascular   []  Pathology  []  Old records  []  Other:    Assessment & Plan   Assessment / Plan     Assessment:  Hepatic encephalopathy  Seizure  Hyperammonemia  S/p mechanical ventilation for airway protection, self extubated on 5/20  Decompensated ROMO cirrhosis  Staph bacteremia, likely contamination  Chronic thrombocytopenia  Chronic anemia  Frontal lobe atrophy  History of GI bleed secondary to peptic ulcer  History of GERD without esophagitis  Essential hypertension  Insulin-dependent type 2 diabetes  Anxiety and depression  Morbid obesity BMI 43    Plan:  Patient currently being managed in medicine service.  Self extubated on 5/20, saturating well on room air.  Patient more awake and alert now; but still confused.  Likely given he is having multiple bowel movements and ammonia level normal.    On decreased dose of lactulose to 30 g, 3 times daily; continue.  Ammonia level normal.  Continue rifaximin.  Blood culture 5/18 grew gram-positive cocci, staph species not aureus or lugdunensis in 1 set of blood culture;.  Blood culture sent on 5/20, showing no growth at 2-3 days.  Likely contaminant.  Currently saturating well on room air.  On Keppra, continue.  EEG on 5/20 showed no epileptiform discharges.  Neurology and pulmonology following the patient, appreciate input.  Currently on 2 point soft restraints to  prevent self-harm and pulling of tubes/lines.  Hemoglobin 9.5, iron panel sent showed showed iron deficiency.  Started on IV iron from 5/22.  Speech/swallow evaluation consult placed, appreciate input.  If patient able to swallow with no risk of aspiration, will remove coretrak and stop tube feeds.  Currently has bedhold at [facility], patient LTC resident there.    Discussed with RN.    DVT prophylaxis:  Medical and mechanical DVT prophylaxis orders are present.        CODE STATUS:   Medical Intervention Limits: NO intubation (DNI)  Level Of Support Discussed With: Next of Kin (If No Surrogate)  Code Status (Patient has no pulse and is not breathing): No CPR (Do Not Attempt to Resuscitate)  Medical Interventions (Patient has pulse or is breathing): Limited Support      Electronically signed by Rinku Gao MD, 05/23/24, 10:49 AM EDT.

## 2024-05-23 NOTE — PLAN OF CARE
Goal Outcome Evaluation:            FUNCTIONAL ASSESSMENT INSTRUMENT: Patient currently scored a level 7 of 7 on Functional Communication Measures for swallowing indicating a 0% limitation in function.    ASSESSMENT/ PLAN OF CARE:  Pt presents with oropharyngeal swallow.  No clinical signs or symptoms of aspiration noted.    REHAB POTENTIAL:  Pt has good rehab potential.  The following limitations may influence improvement/ length of tx medical status.    RECOMMENDATIONS:   1.   DIET: Mechanical soft diet-single sips of thin liquids    2.  POSITION: 90 degrees upright for all intake    3.  COMPENSATORY STRATEGIES: General swallow precautions, oral meds whole in applesauce    Pt/responsible party agrees with plan of care and has been informed of all alternatives, risks and benefits.        EDUCATION  The patient has been educated in the following areas:   Dysphagia (Swallowing Impairment).

## 2024-05-24 LAB
AMMONIA BLD-SCNC: 42 UMOL/L (ref 16–60)
ANION GAP SERPL CALCULATED.3IONS-SCNC: 8 MMOL/L (ref 5–15)
ARSENIC BLD-MCNC: 2 UG/L (ref 0–9)
BASE EXCESS BLDA CALC-SCNC: -4.3 MMOL/L (ref -2–2)
BASOPHILS # BLD AUTO: 0.02 10*3/MM3 (ref 0–0.2)
BASOPHILS NFR BLD AUTO: 0.5 % (ref 0–1.5)
BDY SITE: ABNORMAL
BUN SERPL-MCNC: 28 MG/DL (ref 6–20)
BUN/CREAT SERPL: 34.1 (ref 7–25)
CALCIUM SPEC-SCNC: 8.5 MG/DL (ref 8.6–10.5)
CHLORIDE SERPL-SCNC: 116 MMOL/L (ref 98–107)
CO2 SERPL-SCNC: 19 MMOL/L (ref 22–29)
COHGB MFR BLD: 0.3 % (ref 0–1.5)
CREAT SERPL-MCNC: 0.82 MG/DL (ref 0.76–1.27)
DEPRECATED RDW RBC AUTO: 64.1 FL (ref 37–54)
EGFRCR SERPLBLD CKD-EPI 2021: 101.8 ML/MIN/1.73
EOSINOPHIL # BLD AUTO: 0.16 10*3/MM3 (ref 0–0.4)
EOSINOPHIL NFR BLD AUTO: 4 % (ref 0.3–6.2)
ERYTHROCYTE [DISTWIDTH] IN BLOOD BY AUTOMATED COUNT: 19.5 % (ref 12.3–15.4)
FHHB: 7.7 % (ref 0–5)
GLUCOSE BLDC GLUCOMTR-MCNC: 131 MG/DL (ref 70–99)
GLUCOSE BLDC GLUCOMTR-MCNC: 144 MG/DL (ref 70–99)
GLUCOSE BLDC GLUCOMTR-MCNC: 155 MG/DL (ref 70–99)
GLUCOSE BLDC GLUCOMTR-MCNC: 173 MG/DL (ref 70–99)
GLUCOSE BLDC GLUCOMTR-MCNC: 184 MG/DL (ref 70–99)
GLUCOSE SERPL-MCNC: 220 MG/DL (ref 65–99)
HCO3 BLDA-SCNC: 20.6 MMOL/L (ref 22–26)
HCT VFR BLD AUTO: 30.2 % (ref 37.5–51)
HGB BLD-MCNC: 9.5 G/DL (ref 13–17.7)
HGB BLDA-MCNC: 11 G/DL (ref 13.8–16.4)
IMM GRANULOCYTES # BLD AUTO: 0.01 10*3/MM3 (ref 0–0.05)
IMM GRANULOCYTES NFR BLD AUTO: 0.2 % (ref 0–0.5)
INHALED O2 CONCENTRATION: 21 %
LACTATE BLDA-SCNC: ABNORMAL MMOL/L
LEAD BLDV-MCNC: <1 UG/DL (ref 0–3.4)
LYMPHOCYTES # BLD AUTO: 0.76 10*3/MM3 (ref 0.7–3.1)
LYMPHOCYTES NFR BLD AUTO: 18.8 % (ref 19.6–45.3)
MAGNESIUM SERPL-MCNC: 2.4 MG/DL (ref 1.6–2.6)
MCH RBC QN AUTO: 28.4 PG (ref 26.6–33)
MCHC RBC AUTO-ENTMCNC: 31.5 G/DL (ref 31.5–35.7)
MCV RBC AUTO: 90.4 FL (ref 79–97)
MERCURY BLD-MCNC: <1 UG/L (ref 0–14.9)
METHGB BLD QL: 0.3 % (ref 0–1.5)
MODALITY: ABNORMAL
MONOCYTES # BLD AUTO: 0.45 10*3/MM3 (ref 0.1–0.9)
MONOCYTES NFR BLD AUTO: 11.1 % (ref 5–12)
NEUTROPHILS NFR BLD AUTO: 2.64 10*3/MM3 (ref 1.7–7)
NEUTROPHILS NFR BLD AUTO: 65.4 % (ref 42.7–76)
NRBC BLD AUTO-RTO: 0 /100 WBC (ref 0–0.2)
OXYHGB MFR BLDV: 91.7 % (ref 94–99)
PCO2 BLDA: 37.3 MM HG (ref 35–45)
PH BLDA: 7.36 PH UNITS (ref 7.35–7.45)
PHOSPHATE SERPL-MCNC: 2.3 MG/DL (ref 2.5–4.5)
PLATELET # BLD AUTO: 73 10*3/MM3 (ref 140–450)
PMV BLD AUTO: 11.2 FL (ref 6–12)
PO2 BLD: 349 MM[HG] (ref 0–500)
PO2 BLDA: 73.3 MM HG (ref 80–100)
POTASSIUM SERPL-SCNC: 3.6 MMOL/L (ref 3.5–5.2)
RBC # BLD AUTO: 3.34 10*6/MM3 (ref 4.14–5.8)
SAO2 % BLDCOA: 92.3 % (ref 95–99)
SODIUM SERPL-SCNC: 143 MMOL/L (ref 136–145)
WBC NRBC COR # BLD AUTO: 4.04 10*3/MM3 (ref 3.4–10.8)

## 2024-05-24 PROCEDURE — 82805 BLOOD GASES W/O2 SATURATION: CPT | Performed by: STUDENT IN AN ORGANIZED HEALTH CARE EDUCATION/TRAINING PROGRAM

## 2024-05-24 PROCEDURE — 82948 REAGENT STRIP/BLOOD GLUCOSE: CPT | Performed by: INTERNAL MEDICINE

## 2024-05-24 PROCEDURE — 25010000002 HALOPERIDOL LACTATE PER 5 MG: Performed by: STUDENT IN AN ORGANIZED HEALTH CARE EDUCATION/TRAINING PROGRAM

## 2024-05-24 PROCEDURE — 82948 REAGENT STRIP/BLOOD GLUCOSE: CPT

## 2024-05-24 PROCEDURE — 25010000002 NA FERRIC GLUC CPLX PER 12.5 MG: Performed by: STUDENT IN AN ORGANIZED HEALTH CARE EDUCATION/TRAINING PROGRAM

## 2024-05-24 PROCEDURE — 85025 COMPLETE CBC W/AUTO DIFF WBC: CPT | Performed by: STUDENT IN AN ORGANIZED HEALTH CARE EDUCATION/TRAINING PROGRAM

## 2024-05-24 PROCEDURE — 94799 UNLISTED PULMONARY SVC/PX: CPT

## 2024-05-24 PROCEDURE — 83050 HGB METHEMOGLOBIN QUAN: CPT | Performed by: STUDENT IN AN ORGANIZED HEALTH CARE EDUCATION/TRAINING PROGRAM

## 2024-05-24 PROCEDURE — 83735 ASSAY OF MAGNESIUM: CPT | Performed by: STUDENT IN AN ORGANIZED HEALTH CARE EDUCATION/TRAINING PROGRAM

## 2024-05-24 PROCEDURE — 82140 ASSAY OF AMMONIA: CPT | Performed by: STUDENT IN AN ORGANIZED HEALTH CARE EDUCATION/TRAINING PROGRAM

## 2024-05-24 PROCEDURE — 80048 BASIC METABOLIC PNL TOTAL CA: CPT | Performed by: STUDENT IN AN ORGANIZED HEALTH CARE EDUCATION/TRAINING PROGRAM

## 2024-05-24 PROCEDURE — 99232 SBSQ HOSP IP/OBS MODERATE 35: CPT | Performed by: STUDENT IN AN ORGANIZED HEALTH CARE EDUCATION/TRAINING PROGRAM

## 2024-05-24 PROCEDURE — 25010000002 LEVETIRACETAM IN NACL 0.82% 500 MG/100ML SOLUTION: Performed by: PSYCHIATRY & NEUROLOGY

## 2024-05-24 PROCEDURE — 63710000001 INSULIN LISPRO (HUMAN) PER 5 UNITS: Performed by: INTERNAL MEDICINE

## 2024-05-24 PROCEDURE — 99233 SBSQ HOSP IP/OBS HIGH 50: CPT | Performed by: INTERNAL MEDICINE

## 2024-05-24 PROCEDURE — 25810000003 SODIUM CHLORIDE 0.9 % SOLUTION 500 ML FLEX CONT: Performed by: INTERNAL MEDICINE

## 2024-05-24 PROCEDURE — 25010000002 THIAMINE PER 100 MG: Performed by: PSYCHIATRY & NEUROLOGY

## 2024-05-24 PROCEDURE — 25810000003 SODIUM CHLORIDE 0.9 % SOLUTION

## 2024-05-24 PROCEDURE — 82375 ASSAY CARBOXYHB QUANT: CPT | Performed by: STUDENT IN AN ORGANIZED HEALTH CARE EDUCATION/TRAINING PROGRAM

## 2024-05-24 PROCEDURE — 84100 ASSAY OF PHOSPHORUS: CPT | Performed by: STUDENT IN AN ORGANIZED HEALTH CARE EDUCATION/TRAINING PROGRAM

## 2024-05-24 PROCEDURE — 36600 WITHDRAWAL OF ARTERIAL BLOOD: CPT | Performed by: STUDENT IN AN ORGANIZED HEALTH CARE EDUCATION/TRAINING PROGRAM

## 2024-05-24 PROCEDURE — 25010000002 THIAMINE HCL 200 MG/2ML SOLUTION: Performed by: PSYCHIATRY & NEUROLOGY

## 2024-05-24 RX ORDER — LACTULOSE 10 G/15ML
30 SOLUTION ORAL
Status: DISCONTINUED | OUTPATIENT
Start: 2024-05-24 | End: 2024-05-25

## 2024-05-24 RX ORDER — HALOPERIDOL 5 MG/ML
1 INJECTION INTRAMUSCULAR ONCE
Status: COMPLETED | OUTPATIENT
Start: 2024-05-24 | End: 2024-05-24

## 2024-05-24 RX ORDER — FENTANYL/ROPIVACAINE/NS/PF 2-625MCG/1
15 PLASTIC BAG, INJECTION (ML) EPIDURAL ONCE
Status: COMPLETED | OUTPATIENT
Start: 2024-05-24 | End: 2024-05-24

## 2024-05-24 RX ORDER — HALOPERIDOL 5 MG/ML
2 INJECTION INTRAMUSCULAR ONCE
Status: DISCONTINUED | OUTPATIENT
Start: 2024-05-24 | End: 2024-05-28

## 2024-05-24 RX ADMIN — THIAMINE HYDROCHLORIDE 100 MG: 100 INJECTION, SOLUTION INTRAMUSCULAR; INTRAVENOUS at 04:18

## 2024-05-24 RX ADMIN — ACETAMINOPHEN 650 MG: 325 TABLET ORAL at 05:57

## 2024-05-24 RX ADMIN — Medication 10 ML: at 21:54

## 2024-05-24 RX ADMIN — SERTRALINE HYDROCHLORIDE 100 MG: 100 TABLET, FILM COATED ORAL at 21:53

## 2024-05-24 RX ADMIN — Medication 1000 UNITS: at 09:25

## 2024-05-24 RX ADMIN — BUSPIRONE HYDROCHLORIDE 7.5 MG: 5 TABLET ORAL at 16:12

## 2024-05-24 RX ADMIN — HYDROCORTISONE 1 APPLICATION: 1 OINTMENT TOPICAL at 21:54

## 2024-05-24 RX ADMIN — LACTULOSE 30 G: 20 SOLUTION ORAL at 16:11

## 2024-05-24 RX ADMIN — INSULIN LISPRO 2 UNITS: 100 INJECTION, SOLUTION INTRAVENOUS; SUBCUTANEOUS at 09:25

## 2024-05-24 RX ADMIN — LEVETIRACETAM 500 MG: 5 INJECTION, SOLUTION INTRAVENOUS at 11:38

## 2024-05-24 RX ADMIN — POTASSIUM PHOSPHATES 15 MMOL: 236; 224 INJECTION, SOLUTION INTRAVENOUS at 09:24

## 2024-05-24 RX ADMIN — CARVEDILOL 6.25 MG: 6.25 TABLET, FILM COATED ORAL at 17:05

## 2024-05-24 RX ADMIN — LEVETIRACETAM 500 MG: 5 INJECTION, SOLUTION INTRAVENOUS at 17:29

## 2024-05-24 RX ADMIN — SODIUM CHLORIDE 40 ML: 9 INJECTION, SOLUTION INTRAVENOUS at 09:25

## 2024-05-24 RX ADMIN — Medication 10 ML: at 09:26

## 2024-05-24 RX ADMIN — LEVETIRACETAM 500 MG: 5 INJECTION, SOLUTION INTRAVENOUS at 23:54

## 2024-05-24 RX ADMIN — RIFAXIMIN 550 MG: 550 TABLET ORAL at 09:25

## 2024-05-24 RX ADMIN — SUCRALFATE 1 G: 1 TABLET ORAL at 21:53

## 2024-05-24 RX ADMIN — BUSPIRONE HYDROCHLORIDE 7.5 MG: 5 TABLET ORAL at 09:25

## 2024-05-24 RX ADMIN — THIAMINE HYDROCHLORIDE 100 MG: 100 INJECTION, SOLUTION INTRAMUSCULAR; INTRAVENOUS at 21:52

## 2024-05-24 RX ADMIN — HALOPERIDOL LACTATE 1 MG: 5 INJECTION, SOLUTION INTRAMUSCULAR at 13:11

## 2024-05-24 RX ADMIN — LACTULOSE 30 G: 20 SOLUTION ORAL at 23:54

## 2024-05-24 RX ADMIN — HYDROCORTISONE 1 APPLICATION: 1 OINTMENT TOPICAL at 16:11

## 2024-05-24 RX ADMIN — CARVEDILOL 6.25 MG: 6.25 TABLET, FILM COATED ORAL at 09:25

## 2024-05-24 RX ADMIN — SUCRALFATE 1 G: 1 TABLET ORAL at 09:25

## 2024-05-24 RX ADMIN — SODIUM CHLORIDE 125 MG: 9 INJECTION, SOLUTION INTRAVENOUS at 10:00

## 2024-05-24 RX ADMIN — LACTULOSE 30 G: 20 SOLUTION ORAL at 09:25

## 2024-05-24 RX ADMIN — RIFAXIMIN 550 MG: 550 TABLET ORAL at 21:53

## 2024-05-24 RX ADMIN — LACTULOSE 30 G: 20 SOLUTION ORAL at 01:46

## 2024-05-24 RX ADMIN — PANTOPRAZOLE SODIUM 40 MG: 40 INJECTION, POWDER, FOR SOLUTION INTRAVENOUS at 05:44

## 2024-05-24 RX ADMIN — LACTULOSE 30 G: 20 SOLUTION ORAL at 21:53

## 2024-05-24 RX ADMIN — BUSPIRONE HYDROCHLORIDE 7.5 MG: 5 TABLET ORAL at 21:53

## 2024-05-24 RX ADMIN — HYDROCORTISONE 1 APPLICATION: 1 OINTMENT TOPICAL at 09:25

## 2024-05-24 RX ADMIN — THIAMINE HYDROCHLORIDE 100 MG: 100 INJECTION, SOLUTION INTRAMUSCULAR; INTRAVENOUS at 11:38

## 2024-05-24 RX ADMIN — SUCRALFATE 1 G: 1 TABLET ORAL at 16:50

## 2024-05-24 RX ADMIN — LEVETIRACETAM 500 MG: 5 INJECTION, SOLUTION INTRAVENOUS at 05:44
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
## 2024-05-24 NOTE — PLAN OF CARE
Patient has been restless throughout the day. Patient is uncooperative with medication regimen. No complaints of pain today. No acute changes.     Bereket Cruz, HUNTERN, RN

## 2024-05-24 NOTE — PLAN OF CARE
Goal Outcome Evaluation:         Patient pulled out fecal management system at beginning of shift. New one replaced. Patient is out of two point restraints, safety sitter at bedside. Patient receiving tube feeds. Patient had multiple bowel movements. Patient intermittently resting through night. Call light is in reach.

## 2024-05-24 NOTE — PROGRESS NOTES
Pulmonary / Critical Care Progress Note      Patient Name: Nic Nicolas  : 1965  MRN: [id number]  Attending:  Rinku Gao MD   Date of admission: 2024    Subjective   Subjective   Follow-up for altered mental status, seizures, hepatic encephalopathy    Over past 24 hours: Remained on lactulose, rifaximin and Keppra.  Remained altered, was on restraints.  Continued with Cortrak, restraint and tube feeds.  Increased dose of lactulose to 30 g 4 times daily.    No acute events overnight.    This morning,  Remains on room air  Remains altered, not optimally responsive.  More awake and following commands.  Does not know where he is at.  Cortrak in place.  Off restraints  Flexi-Seal remains in place.  Creatinine remained stable    Objective   Objective     Vitals:   Vital signs for last 24 hours:  Temp:  [97.7 °F (36.5 °C)-98.8 °F (37.1 °C)] 98.1 °F (36.7 °C)  Heart Rate:  [82-87] 83  Resp:  [18] 18  BP: (131-174)/(53-78) 168/64    Physical Exam   Vital Signs Reviewed  Chronically ill male encephalopathic, alert to self  Chest: No work of breathing noted, equal rise and fall of chest, clear to auscultation  CV: RRR, no MGR, pulses 2+, equal.  Neuro: Alert to self, follows some commands, generalized weakness, cranial nerves II to XII are intact  Skin: No rashes or lesions noted    Result Review    Result Review:  I have personally reviewed the results from the time of this admission to 2024 06:54 EDT and agree with these findings:  [x]  Laboratory  [x]  Microbiology  [x]  Radiology  [x]  EKG/Telemetry   [x]  Cardiology/Vascular   []  Pathology  []  Old records  []  Other:  Most notable findings include:       Lab 24  0401 24  0425 24  0419 24  0518 24  0442 24  0431 24  2046   WBC 4.04 4.05 5.31 4.97 9.11 4.39  --    HEMOGLOBIN 9.5* 9.5* 9.9* 9.3* 11.0* 10.2*  --    HEMATOCRIT 30.2* 30.5* 30.8* 28.6* 33.5* 31.3*  --    PLATELETS 73* 65* 64* 46* 95* 66*  --     SODIUM 143 142 135* 135* 136 135* 136   POTASSIUM 3.6 3.8 3.8 3.9 3.8 3.6 3.8   CHLORIDE 116* 110* 107 104 101 100 100   CO2 19.0* 21.0* 18.1* 23.0 21.9* 22.1 21.8*   BUN 28* 34* 38* 45* 39* 30* 27*   CREATININE 0.82 0.78 1.06 1.22 1.68* 1.36* 1.33*   GLUCOSE 220* 166* 214* 200* 177* 130* 175*   CALCIUM 8.5* 8.5* 8.2* 8.2* 8.6 8.8 8.8   PHOSPHORUS 2.3* 2.3* 1.9* 2.7 4.6* 4.7* 4.3   TOTAL PROTEIN  --  5.9* 5.5* 5.7* 6.2 5.8*  --    ALBUMIN  --  2.9* 2.8* 2.8* 3.2* 3.0*  --    GLOBULIN  --  3.0 2.7 2.9 3.0 2.8  --          Assessment & Plan   Assessment / Plan     Active Hospital Problems:  Active Hospital Problems    Diagnosis     **Hepatic encephalopathy    Seizures  Intubated and placed on ventilator for airway protection  Deep sedation secondary to persistent seizure activity  Hyperammonemia  Hepatic encephalopathy  Decompensated cirrhosis  Hypokalemia  Hyponatremia, clinically insignificant  Acute kidney injury secondary to prerenal etiology     Plan:   Supplemental oxygen if needed to keep sats more than 90%.  Remain on room air  Repeat blood culture NGTD x 24.  Initial blood cultures 1 out of 2 with coag negative staph.  Likely contaminant  Continue lactulose 30 g, increased to 4 times daily  Continue rifaximin 550 mg twice daily.  Recommend discontinuing fecal management system  Neurology on board  Continue Keppra  EEG with no epileptiform discharges, slowed background activity compatible with moderate and diffuse encephalopathy  NG tube in place.  Dietitian on board.  Tube feeds and free water recommendations per them  Trend H&H.  Recommend transfusion for Hgb 7 or less.  Platelet count 46.  No active bleeding noted  Continue Coreg scheduled with parameters  Trend renal function electrolytes.  Replaced potassium and phosphorus intravenously  Patient has been told to [place]     Unless otherwise needed,pulmonary will sign off at this time. Please call with any questions or concerns.    DVT  prophylaxis:  Medical and mechanical DVT prophylaxis orders are present.    CODE STATUS:   Medical Intervention Limits: NO intubation (DNI)  Level Of Support Discussed With: Next of Kin (If No Surrogate)  Code Status (Patient has no pulse and is not breathing): No CPR (Do Not Attempt to Resuscitate)  Medical Interventions (Patient has pulse or is breathing): Limited Support    I have reviewed labs, imaging, pertinent clinical data and provider notes.   I have discussed with bedside nurse and primary service.     Electronically signed by BESSIE Benitez, 05/24/24, 8:55 AM EDT.  Electronically signed by Abimael Cotton MD, 05/24/24, 6:54 AM EDT.    This visit was performed by BOTH a physician and an APC. I personally evaluated and examined the patient. I performed all aspects of MDM as documented. , I have reviewed and confirmed the accuracy of the patient's history as documented in this note., and I have reexamined the patient and the results are consistent with the previously documented exam. I have updated the documentation as necessary.     Electronically signed by Abimael Cotton MD, 05/24/24, 1:41 PM EDT.

## 2024-05-24 NOTE — PROGRESS NOTES
[facility]   Hospitalist Progress Note  Date: 2024  Patient Name: Nic Nicolas  : 1965  MRN: [id number]  Date of admission: 2024  Room/Bed: 260/1      Subjective   Subjective     Chief Complaint: Follow up for confusion     Summary:Nic Nicolas is a 59 y.o. male th cirrhosis of the liver due to ROMO can have a hepatic encephalopathy, DVT in the past, diabetes on insulin, hypertension, obstructive sleep apnea, TBI, and asthma/COPD who presents with confusion. VSS. CBC shows no leukocytosis but does show a hemoglobin of 10.7 which is above his baseline.  CMP shows a slightly elevated bilirubin at 2.4.  Ammonia level is 96.  UA shows moderate blood, positive nitrite, small leuk esterase trace bacteria.  CT of the head shows global/frontal lobe atrophy.  Patient was given 2 g of ceftriaxone and started on lactulose q4 hours.  Gradual improvement in mentation.  Ammonia normalized.  No ascites on ultrasound.  Completed Rocephin.  Had CODE BLUE called after getting MRI of the brain where he became cyanotic with agonal breathing but never lost a pulse.  Had multiple seizures and had to be intubated for airway protection.  On Keppra for seizures.  Patient was started on lactulose.  Was on propofol/fentanyl drip for sedation due to seizure activities.  Patient self extubated on .  Saturating in room air thereafter.  Was also on Levophed for pressor support.  Eventually patient was weaned off Levophed and blood pressure has been stable.  Sedation was discontinued.  Also had multiple bowel movements after which she was more awake and alert.  He was transferred out of critical care unit on .  Mentation improving.  Patient still on restraints to prevent him from pulling out the core track.  Speech evaluated the patient    Interval Followup: Patient is confused compared to yesterday.  Speech evaluated and recommended diet, core track was removed and tube feeding was held.  Patient more  confused and combative today.  Restraints were placed back.  He was refusing medications for which NG tube was placed so he can receive his lactulose along with other medications.  Also received Haldol to calm him down.  Confused.  Not answering questions.  Alert and awake.  Vital stable overnight.    Review of Systems    All systems reviewed and negative except for what is outlined above.      Objective   Objective     Vitals:   Temp:  [97.7 °F (36.5 °C)-98.8 °F (37.1 °C)] 98.1 °F (36.7 °C)  Heart Rate:  [82-87] 83  Resp:  [18] 18  BP: (131-174)/(53-78) 168/64    Physical Exam   General: Awake and alert, more confused today.    Cardiovascular: RRR, no murmurs   Pulmonary: CTA bilaterally; no wheezes; no conversational dyspnea  Gastrointestinal: S/ND/NT, +BS  Neuro: Awake, alert, orientation could not be assessed as patient did not answer any of the questions today.  More confused today.    Result Review    Result Review:  I have personally reviewed these results:  [x]  Laboratory      Lab 05/24/24  0401 05/23/24  0425 05/22/24  0419 05/19/24  0431 05/19/24  0000 05/18/24  2046 05/18/24 2010   WBC 4.04 4.05 5.31   < >  --   --   --    HEMOGLOBIN 9.5* 9.5* 9.9*   < >  --   --   --    HEMATOCRIT 30.2* 30.5* 30.8*   < >  --   --   --    PLATELETS 73* 65* 64*   < >  --   --   --    NEUTROS ABS 2.64 2.60 3.74  --   --   --   --    IMMATURE GRANS (ABS) 0.01 0.02 0.01  --   --   --   --    LYMPHS ABS 0.76 0.76 0.63*  --   --   --   --    MONOS ABS 0.45 0.48 0.76  --   --   --   --    EOS ABS 0.16 0.15 0.15  --   --   --   --    MCV 90.4 90.5 89.0   < >  --   --   --    CRP  --   --   --   --   --  <0.30  --    LACTATE  --   --   --   --  1.5 3.2*  --    LACTATE, ARTERIAL  --   --   --   --   --   --  3.62*   D DIMER QUANT  --   --   --   --   --  1.22*  --     < > = values in this interval not displayed.         Lab 05/24/24  0401 05/23/24  0425 05/22/24 0419 05/18/24 2046 05/18/24 2010 05/18/24  1819   SODIUM 143 142  135*   < >  --   --    SODIUM, ARTERIAL  --   --   --   --  133.9* 136.2   POTASSIUM 3.6 3.8 3.8   < >  --   --    CHLORIDE 116* 110* 107   < >  --   --    CO2 19.0* 21.0* 18.1*   < >  --   --    ANION GAP 8.0 11.0 9.9   < >  --   --    BUN 28* 34* 38*   < >  --   --    CREATININE 0.82 0.78 1.06   < >  --   --    EGFR 101.8 103.4 81.3   < >  --   --    GLUCOSE 220* 166* 214*   < >  --   --    GLUCOSE, ARTERIAL  --   --   --   --  166* 156*   CALCIUM 8.5* 8.5* 8.2*   < >  --   --    IONIZED CALCIUM  --   --   --   --  1.12* 1.15   MAGNESIUM 2.4 2.4 2.4   < >  --   --    PHOSPHORUS 2.3* 2.3* 1.9*   < >  --   --     < > = values in this interval not displayed.         Lab 05/23/24  0425 05/22/24 0419 05/21/24  0518   TOTAL PROTEIN 5.9* 5.5* 5.7*   ALBUMIN 2.9* 2.8* 2.8*   GLOBULIN 3.0 2.7 2.9   ALT (SGPT) 17 20 18   AST (SGOT) 30 35 36   BILIRUBIN 1.1 1.1 1.3*   ALK PHOS 70 75 67                 Lab 05/22/24 0419 05/21/24  0518   IRON  --  35*   IRON SATURATION (TSAT)  --  11*   TIBC  --  326   TRANSFERRIN  --  219   FERRITIN 67.40  --          Lab 05/18/24 2010 05/18/24  1819   PH, ARTERIAL 7.268* 7.243*   PCO2, ARTERIAL 45.7* 35.9   PO2 .0* 204.7*   O2 SATURATION ART 97.6 98.9   FIO2 40 100   HCO3 ART 20.4* 15.1*   BASE EXCESS ART -6.4* -11.3*   CARBOXYHEMOGLOBIN 0.2 0.5     Brief Urine Lab Results  (Last result in the past 365 days)        Color   Clarity   Blood   Leuk Est   Nitrite   Protein   CREAT   Urine HCG        05/18/24 2110 Yellow   Clear   Negative   Negative   Negative   Negative                 [x]  Microbiology   Microbiology Results (last 10 days)       Procedure Component Value - Date/Time    Blood Culture - Blood, Arm, Right [[id number]]  (Normal) Collected: 05/20/24 1220    Lab Status: Preliminary result Specimen: Blood from Arm, Right Updated: 05/24/24 0700     Blood Culture No growth at 3 days    Blood Culture - Blood, Hand, Left [[id number]]  (Normal) Collected: 05/20/24 1206    Lab  Status: Preliminary result Specimen: Blood from Hand, Left Updated: 05/23/24 1245     Blood Culture No growth at 3 days    Respiratory Culture - Sputum, ET Suction [[id number]] Collected: 05/18/24 2126    Lab Status: Final result Specimen: Sputum from ET Suction Updated: 05/20/24 1056     Respiratory Culture Scant growth (1+) Normal respiratory marion. No S. aureus or Pseudomonas aeruginosa detected. Final report.     Gram Stain Moderate (3+) WBCs seen      Moderate (3+) Epithelial cells seen      Few (2+) Gram positive cocci in pairs, chains and clusters    MRSA Screen, PCR (Inpatient) - Swab, Nares [[id number]]  (Normal) Collected: 05/18/24 2112    Lab Status: Final result Specimen: Swab from Nares Updated: 05/18/24 2235     MRSA PCR No MRSA Detected    Narrative:      The negative predictive value of this diagnostic test is high and should only be used to consider de-escalating anti-MRSA therapy. A positive result may indicate colonization with MRSA and must be correlated clinically.    Respiratory Panel PCR w/COVID-19(SARS-CoV-2) [facility]ENDAN/LE/CATRACH[facility] In-House, NP Swab in UTM/VTM, 2 HR TAT - Swab, Nasopharynx [[id number]]  (Normal) Collected: 05/18/24 2112    Lab Status: Final result Specimen: Swab from Nasopharynx Updated: 05/19/24 0655     ADENOVIRUS, PCR Not Detected     Coronavirus 229E Not Detected     Coronavirus HKU1 Not Detected     Coronavirus NL63 Not Detected     Coronavirus OC43 Not Detected     COVID19 Not Detected     Human Metapneumovirus Not Detected     Human Rhinovirus/Enterovirus Not Detected     Influenza A PCR Not Detected     Influenza B PCR Not Detected     Parainfluenza Virus 1 Not Detected     Parainfluenza Virus 2 Not Detected     Parainfluenza Virus 3 Not Detected     Parainfluenza Virus 4 Not Detected     RSV, PCR Not Detected     Bordetella pertussis pcr Not Detected     Bordetella parapertussis PCR Not Detected     Chlamydophila pneumoniae PCR Not Detected     Mycoplasma pneumo by PCR  Not Detected    Narrative:      In the setting of a positive respiratory panel with a viral infection PLUS a negative procalcitonin without other underlying concern for bacterial infection, consider observing off antibiotics or discontinuation of antibiotics and continue supportive care. If the respiratory panel is positive for atypical bacterial infection (Bordetella pertussis, Chlamydophila pneumoniae, or Mycoplasma pneumoniae), consider antibiotic de-escalation to target atypical bacterial infection.    S. Pneumo Ag Urine or CSF - Urine, Urine, Clean Catch [[id number]]  (Normal) Collected: 05/18/24 2110    Lab Status: Final result Specimen: Urine, Clean Catch Updated: 05/18/24 2151     Strep Pneumo Ag Negative    Legionella Antigen, Urine - Urine, Urine, Clean Catch [[id number]]  (Normal) Collected: 05/18/24 2110    Lab Status: Final result Specimen: Urine, Clean Catch Updated: 05/18/24 2151     LEGIONELLA ANTIGEN, URINE Negative    Blood Culture - Blood, Arm, Left [[id number]]  (Abnormal) Collected: 05/18/24 2046    Lab Status: Edited Result - FINAL Specimen: Blood from Arm, Left Updated: 05/22/24 0706     Blood Culture Staphylococcus, coagulase negative     Isolated from Aerobic and Anaerobic Bottles     Gram Stain Aerobic Bottle Gram positive cocci in clusters      Anaerobic Bottle Gram positive cocci in clusters     Comment: Appended report. These results have been appended to a previously final verified report.       Narrative:      Probable contaminant requires clinical correlation, susceptibility not performed unless requested by physician.      Blood Culture ID, PCR - Blood, Arm, Left [[id number]]  (Abnormal) Collected: 05/18/24 2046    Lab Status: Final result Specimen: Blood from Arm, Left Updated: 05/19/24 2001     BCID, PCR Staph spp, not aureus or lugdunensis. Identification by BCID2 PCR.     BOTTLE TYPE Aerobic Bottle    Blood Culture - Blood, Arm, Left [[id number]]  (Normal) Collected: 05/18/24 2025     Lab Status: Final result Specimen: Blood from Arm, Left Updated: 05/23/24 2045     Blood Culture No growth at 5 days          [x]  Radiology  XR Chest 1 View    Result Date: 5/20/2024  Lungs are slightly better aerated than on prior exam, suggesting improving pneumonia or pulmonary edema.   Electronically Signed By-Theo Castaneda MD On:5/20/2024 3:14 PM      XR Abdomen KUB    Result Date: 5/19/2024  Impression: No evidence of stool impaction or obstruction   Electronically Signed By-Darrick Gomez On:5/19/2024 6:19 PM      XR Chest 1 View    Result Date: 5/18/2024  The endotracheal (ET) tube, nasogastric (NG) tube, and right IJ central venous line are thought to be in satisfactory position. Bilateral airspace opacities are present and may represent infectious multifocal pneumonia. No pneumothorax is seen. Please see above comments for further detail.    Please note that portions of this note were completed with a voice recognition program.     Electronically Signed By-Herberth Arevalo MD On:5/18/2024 8:34 PM      MRI Brain Without Contrast    Result Date: 5/18/2024  Impression:  1. No acute process 2. Parenchymal atrophy and chronic small vessel ischemic white matter changes    Electronically Signed BySocorro Gomez On:5/18/2024 5:55 PM      US Abdomen Limited    Result Date: 5/16/2024  Impression: Inadequate ascites for safe paracentesis at this time.  Electronically Signed By-FABI DIAZ MD On:5/16/2024 11:58 AM     []  EKG/Telemetry   []  Cardiology/Vascular   []  Pathology  []  Old records  []  Other:    Assessment & Plan   Assessment / Plan     Assessment:  Hepatic encephalopathy  Seizure  Hyperammonemia  S/p mechanical ventilation for airway protection, self extubated on 5/20  Decompensated ROMO cirrhosis  Staph bacteremia, likely contamination  Chronic thrombocytopenia  Chronic anemia  Frontal lobe atrophy  History of GI bleed secondary to peptic ulcer  History of GERD without esophagitis  Essential  hypertension  Insulin-dependent type 2 diabetes  Anxiety and depression  Morbid obesity BMI 43    Plan:  Patient currently being managed in medicine service.  Self extubated on 5/20, saturating well on room air.  Patient awake and alert; but more confused today.  Likely given he is having multiple bowel movements and ammonia level normal.    On creased dose of lactulose to 30 g, 4 times daily; continue.  Ammonia level 42 today.  ABG was obtained which was not significant  Continue rifaximin.  Blood culture 5/18 grew gram-positive cocci, staph species not aureus or lugdunensis in 1 set of blood culture;.  Blood culture sent on 5/20, showing no growth at 3 days.  Likely contaminant.  Currently saturating well on room air.  On Keppra, continue.  EEG on 5/20 showed no epileptiform discharges.  Neurology and pulmonology following the patient, appreciate input.  Currently on 2 point soft restraints to prevent self-harm and pulling of tubes/lines.  Patient is also combative today.  Hemoglobin 9.5, iron panel sent showed showed iron deficiency. On IV iron from 5/22.  Speech/swallow evaluated the patient, recommended mechanical soft diet, thin liquid.  Diet ordered.  Currently has bedhold at [place], patient LTC resident there.  Clinical course to determine disposition.    Discussed with RN.    DVT prophylaxis:  Medical and mechanical DVT prophylaxis orders are present.        CODE STATUS:   Medical Intervention Limits: NO intubation (DNI)  Level Of Support Discussed With: Next of Kin (If No Surrogate)  Code Status (Patient has no pulse and is not breathing): No CPR (Do Not Attempt to Resuscitate)  Medical Interventions (Patient has pulse or is breathing): Limited Support      Electronically signed by Rinku Gao MD, 05/24/24, 10:49 AM EDT.

## 2024-05-25 ENCOUNTER — APPOINTMENT (OUTPATIENT)
Dept: CT IMAGING | Facility: HOSPITAL | Age: 59
End: 2024-05-25
Payer: MEDICAID

## 2024-05-25 LAB
AMMONIA BLD-SCNC: 21 UMOL/L (ref 16–60)
ANION GAP SERPL CALCULATED.3IONS-SCNC: 10.4 MMOL/L (ref 5–15)
BACTERIA SPEC AEROBE CULT: NORMAL
BASOPHILS # BLD AUTO: 0.03 10*3/MM3 (ref 0–0.2)
BASOPHILS NFR BLD AUTO: 0.6 % (ref 0–1.5)
BUN SERPL-MCNC: 23 MG/DL (ref 6–20)
BUN/CREAT SERPL: 29.9 (ref 7–25)
CALCIUM SPEC-SCNC: 8.8 MG/DL (ref 8.6–10.5)
CHLORIDE SERPL-SCNC: 117 MMOL/L (ref 98–107)
CO2 SERPL-SCNC: 17.6 MMOL/L (ref 22–29)
CREAT SERPL-MCNC: 0.77 MG/DL (ref 0.76–1.27)
DEPRECATED RDW RBC AUTO: 65.5 FL (ref 37–54)
EGFRCR SERPLBLD CKD-EPI 2021: 103.8 ML/MIN/1.73
EOSINOPHIL # BLD AUTO: 0.21 10*3/MM3 (ref 0–0.4)
EOSINOPHIL NFR BLD AUTO: 4.1 % (ref 0.3–6.2)
ERYTHROCYTE [DISTWIDTH] IN BLOOD BY AUTOMATED COUNT: 19.7 % (ref 12.3–15.4)
FOLATE SERPL-MCNC: 10.2 NG/ML (ref 4.78–24.2)
GLUCOSE BLDC GLUCOMTR-MCNC: 121 MG/DL (ref 70–99)
GLUCOSE BLDC GLUCOMTR-MCNC: 127 MG/DL (ref 70–99)
GLUCOSE BLDC GLUCOMTR-MCNC: 142 MG/DL (ref 70–99)
GLUCOSE SERPL-MCNC: 133 MG/DL (ref 65–99)
HCT VFR BLD AUTO: 33 % (ref 37.5–51)
HGB BLD-MCNC: 10.3 G/DL (ref 13–17.7)
IMM GRANULOCYTES # BLD AUTO: 0.01 10*3/MM3 (ref 0–0.05)
IMM GRANULOCYTES NFR BLD AUTO: 0.2 % (ref 0–0.5)
LYMPHOCYTES # BLD AUTO: 1.01 10*3/MM3 (ref 0.7–3.1)
LYMPHOCYTES NFR BLD AUTO: 19.5 % (ref 19.6–45.3)
MAGNESIUM SERPL-MCNC: 2.1 MG/DL (ref 1.6–2.6)
MCH RBC QN AUTO: 28.5 PG (ref 26.6–33)
MCHC RBC AUTO-ENTMCNC: 31.2 G/DL (ref 31.5–35.7)
MCV RBC AUTO: 91.2 FL (ref 79–97)
MONOCYTES # BLD AUTO: 0.52 10*3/MM3 (ref 0.1–0.9)
MONOCYTES NFR BLD AUTO: 10 % (ref 5–12)
NEUTROPHILS NFR BLD AUTO: 3.4 10*3/MM3 (ref 1.7–7)
NEUTROPHILS NFR BLD AUTO: 65.6 % (ref 42.7–76)
NRBC BLD AUTO-RTO: 0 /100 WBC (ref 0–0.2)
PHOSPHATE SERPL-MCNC: 2.9 MG/DL (ref 2.5–4.5)
PLATELET # BLD AUTO: 82 10*3/MM3 (ref 140–450)
PMV BLD AUTO: 10.6 FL (ref 6–12)
POTASSIUM SERPL-SCNC: 3.8 MMOL/L (ref 3.5–5.2)
RBC # BLD AUTO: 3.62 10*6/MM3 (ref 4.14–5.8)
SODIUM SERPL-SCNC: 145 MMOL/L (ref 136–145)
VIT B12 BLD-MCNC: 1048 PG/ML (ref 211–946)
WBC NRBC COR # BLD AUTO: 5.18 10*3/MM3 (ref 3.4–10.8)

## 2024-05-25 PROCEDURE — 82607 VITAMIN B-12: CPT | Performed by: STUDENT IN AN ORGANIZED HEALTH CARE EDUCATION/TRAINING PROGRAM

## 2024-05-25 PROCEDURE — 25010000002 MIDAZOLAM PER 1MG: Performed by: STUDENT IN AN ORGANIZED HEALTH CARE EDUCATION/TRAINING PROGRAM

## 2024-05-25 PROCEDURE — 82746 ASSAY OF FOLIC ACID SERUM: CPT | Performed by: STUDENT IN AN ORGANIZED HEALTH CARE EDUCATION/TRAINING PROGRAM

## 2024-05-25 PROCEDURE — 85025 COMPLETE CBC W/AUTO DIFF WBC: CPT | Performed by: STUDENT IN AN ORGANIZED HEALTH CARE EDUCATION/TRAINING PROGRAM

## 2024-05-25 PROCEDURE — 82140 ASSAY OF AMMONIA: CPT | Performed by: STUDENT IN AN ORGANIZED HEALTH CARE EDUCATION/TRAINING PROGRAM

## 2024-05-25 PROCEDURE — 25010000002 HALOPERIDOL LACTATE PER 5 MG: Performed by: STUDENT IN AN ORGANIZED HEALTH CARE EDUCATION/TRAINING PROGRAM

## 2024-05-25 PROCEDURE — 0 DEXTROSE 5 % SOLUTION: Performed by: STUDENT IN AN ORGANIZED HEALTH CARE EDUCATION/TRAINING PROGRAM

## 2024-05-25 PROCEDURE — 80048 BASIC METABOLIC PNL TOTAL CA: CPT | Performed by: STUDENT IN AN ORGANIZED HEALTH CARE EDUCATION/TRAINING PROGRAM

## 2024-05-25 PROCEDURE — 83735 ASSAY OF MAGNESIUM: CPT | Performed by: STUDENT IN AN ORGANIZED HEALTH CARE EDUCATION/TRAINING PROGRAM

## 2024-05-25 PROCEDURE — 74176 CT ABD & PELVIS W/O CONTRAST: CPT

## 2024-05-25 PROCEDURE — 70450 CT HEAD/BRAIN W/O DYE: CPT

## 2024-05-25 PROCEDURE — 25010000002 ENOXAPARIN PER 10 MG: Performed by: STUDENT IN AN ORGANIZED HEALTH CARE EDUCATION/TRAINING PROGRAM

## 2024-05-25 PROCEDURE — 25010000002 THIAMINE HCL 200 MG/2ML SOLUTION: Performed by: PSYCHIATRY & NEUROLOGY

## 2024-05-25 PROCEDURE — 99232 SBSQ HOSP IP/OBS MODERATE 35: CPT | Performed by: STUDENT IN AN ORGANIZED HEALTH CARE EDUCATION/TRAINING PROGRAM

## 2024-05-25 PROCEDURE — 25010000002 LEVETIRACETAM IN NACL 0.82% 500 MG/100ML SOLUTION: Performed by: PSYCHIATRY & NEUROLOGY

## 2024-05-25 PROCEDURE — 82948 REAGENT STRIP/BLOOD GLUCOSE: CPT

## 2024-05-25 PROCEDURE — 84100 ASSAY OF PHOSPHORUS: CPT | Performed by: STUDENT IN AN ORGANIZED HEALTH CARE EDUCATION/TRAINING PROGRAM

## 2024-05-25 RX ORDER — AMOXICILLIN 250 MG
2 CAPSULE ORAL 2 TIMES DAILY
Status: DISCONTINUED | OUTPATIENT
Start: 2024-05-25 | End: 2024-05-29 | Stop reason: HOSPADM

## 2024-05-25 RX ORDER — HALOPERIDOL 5 MG/ML
1 INJECTION INTRAMUSCULAR ONCE
Status: COMPLETED | OUTPATIENT
Start: 2024-05-25 | End: 2024-05-25

## 2024-05-25 RX ORDER — POLYETHYLENE GLYCOL 3350 17 G/17G
17 POWDER, FOR SOLUTION ORAL DAILY PRN
Status: DISCONTINUED | OUTPATIENT
Start: 2024-05-25 | End: 2024-05-29 | Stop reason: HOSPADM

## 2024-05-25 RX ORDER — SUCRALFATE 1 G/1
1 TABLET ORAL
Status: DISCONTINUED | OUTPATIENT
Start: 2024-05-25 | End: 2024-05-29 | Stop reason: HOSPADM

## 2024-05-25 RX ORDER — HALOPERIDOL 5 MG/ML
2 INJECTION INTRAMUSCULAR EVERY 12 HOURS PRN
Status: DISCONTINUED | OUTPATIENT
Start: 2024-05-25 | End: 2024-05-29 | Stop reason: HOSPADM

## 2024-05-25 RX ORDER — MIDAZOLAM HYDROCHLORIDE 2 MG/2ML
2 INJECTION, SOLUTION INTRAMUSCULAR; INTRAVENOUS ONCE
Status: COMPLETED | OUTPATIENT
Start: 2024-05-25 | End: 2024-05-25

## 2024-05-25 RX ORDER — ACETAMINOPHEN 325 MG/1
650 TABLET ORAL EVERY 8 HOURS PRN
Status: DISCONTINUED | OUTPATIENT
Start: 2024-05-25 | End: 2024-05-29 | Stop reason: HOSPADM

## 2024-05-25 RX ORDER — ROPINIROLE 1 MG/1
1 TABLET, FILM COATED ORAL NIGHTLY
Status: DISCONTINUED | OUTPATIENT
Start: 2024-05-25 | End: 2024-05-29 | Stop reason: HOSPADM

## 2024-05-25 RX ORDER — BISACODYL 10 MG
10 SUPPOSITORY, RECTAL RECTAL DAILY PRN
Status: DISCONTINUED | OUTPATIENT
Start: 2024-05-25 | End: 2024-05-29 | Stop reason: HOSPADM

## 2024-05-25 RX ORDER — FERROUS SULFATE 325(65) MG
325 TABLET ORAL
Status: DISCONTINUED | OUTPATIENT
Start: 2024-05-26 | End: 2024-05-29 | Stop reason: HOSPADM

## 2024-05-25 RX ORDER — CARVEDILOL 6.25 MG/1
6.25 TABLET ORAL 2 TIMES DAILY WITH MEALS
Status: DISCONTINUED | OUTPATIENT
Start: 2024-05-25 | End: 2024-05-29 | Stop reason: HOSPADM

## 2024-05-25 RX ORDER — LACTULOSE 10 G/15ML
30 SOLUTION ORAL
Status: DISCONTINUED | OUTPATIENT
Start: 2024-05-25 | End: 2024-05-26

## 2024-05-25 RX ORDER — FUROSEMIDE 40 MG/1
40 TABLET ORAL DAILY
Status: DISCONTINUED | OUTPATIENT
Start: 2024-05-26 | End: 2024-05-29 | Stop reason: HOSPADM

## 2024-05-25 RX ORDER — CALCIUM CARBONATE 500 MG/1
1 TABLET, CHEWABLE ORAL 3 TIMES DAILY PRN
Status: DISCONTINUED | OUTPATIENT
Start: 2024-05-25 | End: 2024-05-29 | Stop reason: HOSPADM

## 2024-05-25 RX ORDER — BUSPIRONE HYDROCHLORIDE 15 MG/1
7.5 TABLET ORAL 3 TIMES DAILY
Status: DISCONTINUED | OUTPATIENT
Start: 2024-05-25 | End: 2024-05-29 | Stop reason: HOSPADM

## 2024-05-25 RX ORDER — SERTRALINE HYDROCHLORIDE 100 MG/1
100 TABLET, FILM COATED ORAL NIGHTLY
Status: DISCONTINUED | OUTPATIENT
Start: 2024-05-25 | End: 2024-05-29 | Stop reason: HOSPADM

## 2024-05-25 RX ORDER — BISACODYL 5 MG/1
5 TABLET, DELAYED RELEASE ORAL DAILY PRN
Status: DISCONTINUED | OUTPATIENT
Start: 2024-05-25 | End: 2024-05-29 | Stop reason: HOSPADM

## 2024-05-25 RX ORDER — MELATONIN
1000 DAILY
Status: DISCONTINUED | OUTPATIENT
Start: 2024-05-26 | End: 2024-05-29 | Stop reason: HOSPADM

## 2024-05-25 RX ORDER — MIDAZOLAM HYDROCHLORIDE 2 MG/2ML
2 INJECTION, SOLUTION INTRAMUSCULAR; INTRAVENOUS ONCE
Status: DISCONTINUED | OUTPATIENT
Start: 2024-05-25 | End: 2024-05-25

## 2024-05-25 RX ADMIN — LEVETIRACETAM 500 MG: 5 INJECTION, SOLUTION INTRAVENOUS at 11:47

## 2024-05-25 RX ADMIN — RIFAXIMIN 550 MG: 550 TABLET ORAL at 21:32

## 2024-05-25 RX ADMIN — CHLORHEXIDINE GLUCONATE 15 ML: 1.2 RINSE ORAL at 23:30

## 2024-05-25 RX ADMIN — HYDROCORTISONE 1 APPLICATION: 1 OINTMENT TOPICAL at 23:31

## 2024-05-25 RX ADMIN — SUCRALFATE 1 G: 1 TABLET ORAL at 21:32

## 2024-05-25 RX ADMIN — MIDAZOLAM HYDROCHLORIDE 2 MG: 1 INJECTION, SOLUTION INTRAMUSCULAR; INTRAVENOUS at 17:26

## 2024-05-25 RX ADMIN — SENNOSIDES AND DOCUSATE SODIUM 2 TABLET: 50; 8.6 TABLET ORAL at 21:32

## 2024-05-25 RX ADMIN — SODIUM BICARBONATE 150 MEQ: 84 INJECTION, SOLUTION INTRAVENOUS at 09:43

## 2024-05-25 RX ADMIN — LEVETIRACETAM 500 MG: 5 INJECTION, SOLUTION INTRAVENOUS at 23:32

## 2024-05-25 RX ADMIN — LACTULOSE 30 G: 20 SOLUTION ORAL at 17:08

## 2024-05-25 RX ADMIN — BUSPIRONE HYDROCHLORIDE 7.5 MG: 15 TABLET ORAL at 17:03

## 2024-05-25 RX ADMIN — ROPINIROLE HYDROCHLORIDE 1 MG: 1 TABLET, FILM COATED ORAL at 21:32

## 2024-05-25 RX ADMIN — HYDROCORTISONE 1 APPLICATION: 1 OINTMENT TOPICAL at 09:44

## 2024-05-25 RX ADMIN — BUSPIRONE HYDROCHLORIDE 7.5 MG: 15 TABLET ORAL at 21:31

## 2024-05-25 RX ADMIN — LEVETIRACETAM 500 MG: 5 INJECTION, SOLUTION INTRAVENOUS at 18:45

## 2024-05-25 RX ADMIN — LACTULOSE 30 G: 20 SOLUTION ORAL at 11:47

## 2024-05-25 RX ADMIN — CARVEDILOL 6.25 MG: 6.25 TABLET, FILM COATED ORAL at 17:03

## 2024-05-25 RX ADMIN — PANTOPRAZOLE SODIUM 40 MG: 40 INJECTION, POWDER, FOR SOLUTION INTRAVENOUS at 05:11

## 2024-05-25 RX ADMIN — THIAMINE HYDROCHLORIDE 100 MG: 100 INJECTION, SOLUTION INTRAMUSCULAR; INTRAVENOUS at 11:47

## 2024-05-25 RX ADMIN — SERTRALINE HYDROCHLORIDE 100 MG: 100 TABLET, FILM COATED ORAL at 21:32

## 2024-05-25 RX ADMIN — THIAMINE HYDROCHLORIDE 100 MG: 100 INJECTION, SOLUTION INTRAMUSCULAR; INTRAVENOUS at 05:11

## 2024-05-25 RX ADMIN — ENOXAPARIN SODIUM 40 MG: 100 INJECTION SUBCUTANEOUS at 23:32

## 2024-05-25 RX ADMIN — LEVETIRACETAM 500 MG: 5 INJECTION, SOLUTION INTRAVENOUS at 05:11

## 2024-05-25 RX ADMIN — HALOPERIDOL LACTATE 1 MG: 5 INJECTION, SOLUTION INTRAMUSCULAR at 08:51

## 2024-05-25 RX ADMIN — LACTULOSE 30 G: 20 SOLUTION ORAL at 21:30

## 2024-05-25 RX ADMIN — SUCRALFATE 1 G: 1 TABLET ORAL at 17:03

## 2024-05-25 RX ADMIN — Medication 10 ML: at 23:31

## 2024-05-25 RX ADMIN — SUCRALFATE 1 G: 1 TABLET ORAL at 11:47

## 2024-05-25 NOTE — PLAN OF CARE
Goal Outcome Evaluation:  Plan of Care Reviewed With: patient        Patient still very confused and uncooperative, patient pulled out coretrack tube despite being in restraints. Patient will not cooperative with care, continually trying to pull on equipment and tubes.

## 2024-05-25 NOTE — PROGRESS NOTES
[facility]   Hospitalist Progress Note  Date: 2024  Patient Name: Nic Nicolas  : 1965  MRN: [id number]  Date of admission: 2024  Room/Bed: 260/1      Subjective   Subjective     Chief Complaint: Follow up for confusion     Summary:Nic Nicolas is a 59 y.o. male th cirrhosis of the liver due to ROMO can have a hepatic encephalopathy, DVT in the past, diabetes on insulin, hypertension, obstructive sleep apnea, TBI, and asthma/COPD who presents with confusion. VSS. CBC shows no leukocytosis but does show a hemoglobin of 10.7 which is above his baseline.  CMP shows a slightly elevated bilirubin at 2.4.  Ammonia level is 96.  UA shows moderate blood, positive nitrite, small leuk esterase trace bacteria.  CT of the head shows global/frontal lobe atrophy.  Patient was given 2 g of ceftriaxone and started on lactulose q4 hours.  Gradual improvement in mentation.  Ammonia normalized.  No ascites on ultrasound.  Completed Rocephin.  Had CODE BLUE called after getting MRI of the brain where he became cyanotic with agonal breathing but never lost a pulse.  Had multiple seizures and had to be intubated for airway protection.  On Keppra for seizures.  Patient was started on lactulose.  Was on propofol/fentanyl drip for sedation due to seizure activities.  Patient self extubated on .  Saturating in room air thereafter.  Was also on Levophed for pressor support.  Eventually patient was weaned off Levophed and blood pressure has been stable.  Sedation was discontinued.  Also had multiple bowel movements after which she was more awake and alert.  He was transferred out of critical care unit on .  Mentation improving.  Patient still on restraints to prevent him from pulling out the core track.  Speech evaluated the patient and started on diet. His mentation was improving but again started deterioration. Pulled his cortrak and rectal tube. On restraints currently. Also receiving as needed haldol  for agitation.    Interval Followup:   Patient is still confused compared to yesterday. Pulled out his cortrak and rectal tube again this AM even while being on 2 point restraints. Patient's ex-wife and daughter at bedside.  Updated about patient's current condition.  Initially refused to take medications but agreed later.  Ammonia of 21.  ABG obtained yesterday was nonsignificant.  Obtaining CT head today.  Patient is very restless, will not be able to stay still for MRI currently.  Alert and awake but not oriented.      Review of Systems    All systems reviewed and negative except for what is outlined above.      Objective   Objective     Vitals:   Temp:  [98.1 °F (36.7 °C)-98.7 °F (37.1 °C)] 98.4 °F (36.9 °C)  Heart Rate:  [74-91] 91  Resp:  [16-18] 18  BP: ()/(53-70) 91/55    Physical Exam   General: Awake and alert; confused.  Cardiovascular: RRR, no murmurs   Pulmonary: CTA bilaterally; no wheezes; no conversational dyspnea  Gastrointestinal: S/ND/NT, +BS  Neuro: Awake, alert, not oriented.  Confused.  Restless.  On restraints.    Result Review    Result Review:  I have personally reviewed these results:  [x]  Laboratory      Lab 05/25/24  0349 05/24/24  0401 05/23/24  0425 05/19/24  0431 05/19/24  0000 05/18/24 2046 05/18/24 2010   WBC 5.18 4.04 4.05   < >  --   --   --    HEMOGLOBIN 10.3* 9.5* 9.5*   < >  --   --   --    HEMATOCRIT 33.0* 30.2* 30.5*   < >  --   --   --    PLATELETS 82* 73* 65*   < >  --   --   --    NEUTROS ABS 3.40 2.64 2.60   < >  --   --   --    IMMATURE GRANS (ABS) 0.01 0.01 0.02   < >  --   --   --    LYMPHS ABS 1.01 0.76 0.76   < >  --   --   --    MONOS ABS 0.52 0.45 0.48   < >  --   --   --    EOS ABS 0.21 0.16 0.15   < >  --   --   --    MCV 91.2 90.4 90.5   < >  --   --   --    CRP  --   --   --   --   --  <0.30  --    LACTATE  --   --   --   --  1.5 3.2*  --    LACTATE, ARTERIAL  --   --   --   --   --   --  3.62*   D DIMER QUANT  --   --   --   --   --  1.22*  --     < >  = values in this interval not displayed.         Lab 05/25/24  0349 05/24/24  0401 05/23/24 0425 05/18/24 2046 05/18/24 2010 05/18/24  1819   SODIUM 145 143 142   < >  --   --    SODIUM, ARTERIAL  --   --   --   --  133.9* 136.2   POTASSIUM 3.8 3.6 3.8   < >  --   --    CHLORIDE 117* 116* 110*   < >  --   --    CO2 17.6* 19.0* 21.0*   < >  --   --    ANION GAP 10.4 8.0 11.0   < >  --   --    BUN 23* 28* 34*   < >  --   --    CREATININE 0.77 0.82 0.78   < >  --   --    EGFR 103.8 101.8 103.4   < >  --   --    GLUCOSE 133* 220* 166*   < >  --   --    GLUCOSE, ARTERIAL  --   --   --   --  166* 156*   CALCIUM 8.8 8.5* 8.5*   < >  --   --    IONIZED CALCIUM  --   --   --   --  1.12* 1.15   MAGNESIUM 2.1 2.4 2.4   < >  --   --    PHOSPHORUS 2.9 2.3* 2.3*   < >  --   --     < > = values in this interval not displayed.         Lab 05/23/24  0425 05/22/24 0419 05/21/24 0518   TOTAL PROTEIN 5.9* 5.5* 5.7*   ALBUMIN 2.9* 2.8* 2.8*   GLOBULIN 3.0 2.7 2.9   ALT (SGPT) 17 20 18   AST (SGOT) 30 35 36   BILIRUBIN 1.1 1.1 1.3*   ALK PHOS 70 75 67                 Lab 05/22/24  0419 05/21/24  0518   IRON  --  35*   IRON SATURATION (TSAT)  --  11*   TIBC  --  326   TRANSFERRIN  --  219   FERRITIN 67.40  --          Lab 05/24/24  1422 05/18/24 2010 05/18/24  1819   PH, ARTERIAL 7.361 7.268* 7.243*   PCO2, ARTERIAL 37.3 45.7* 35.9   PO2 ART 73.3* 130.0* 204.7*   O2 SATURATION ART 92.3* 97.6 98.9   FIO2 21 40 100   HCO3 ART 20.6* 20.4* 15.1*   BASE EXCESS ART -4.3* -6.4* -11.3*   CARBOXYHEMOGLOBIN 0.3 0.2 0.5     Brief Urine Lab Results  (Last result in the past 365 days)        Color   Clarity   Blood   Leuk Est   Nitrite   Protein   CREAT   Urine HCG        05/18/24 2110 Yellow   Clear   Negative   Negative   Negative   Negative                 [x]  Microbiology   Microbiology Results (last 10 days)       Procedure Component Value - Date/Time    Blood Culture - Blood, Arm, Right [[id number]]  (Normal) Collected: 05/20/24 1220     Lab Status: Preliminary result Specimen: Blood from Arm, Right Updated: 05/25/24 0700     Blood Culture No growth at 4 days    Blood Culture - Blood, Hand, Left [[id number]]  (Normal) Collected: 05/20/24 1206    Lab Status: Preliminary result Specimen: Blood from Hand, Left Updated: 05/24/24 1245     Blood Culture No growth at 4 days    Respiratory Culture - Sputum, ET Suction [[id number]] Collected: 05/18/24 2126    Lab Status: Final result Specimen: Sputum from ET Suction Updated: 05/20/24 1056     Respiratory Culture Scant growth (1+) Normal respiratory marion. No S. aureus or Pseudomonas aeruginosa detected. Final report.     Gram Stain Moderate (3+) WBCs seen      Moderate (3+) Epithelial cells seen      Few (2+) Gram positive cocci in pairs, chains and clusters    MRSA Screen, PCR (Inpatient) - Swab, Nares [[id number]]  (Normal) Collected: 05/18/24 2112    Lab Status: Final result Specimen: Swab from Nares Updated: 05/18/24 2235     MRSA PCR No MRSA Detected    Narrative:      The negative predictive value of this diagnostic test is high and should only be used to consider de-escalating anti-MRSA therapy. A positive result may indicate colonization with MRSA and must be correlated clinically.    Respiratory Panel PCR w/COVID-19(SARS-CoV-2) BRENDAN/LE/CATRACHITA/[facility] In-House, NP Swab in UTM/VTM, 2 HR TAT - Swab, Nasopharynx [[id number]]  (Normal) Collected: 05/18/24 2112    Lab Status: Final result Specimen: Swab from Nasopharynx Updated: 05/19/24 0655     ADENOVIRUS, PCR Not Detected     Coronavirus 229E Not Detected     Coronavirus HKU1 Not Detected     Coronavirus NL63 Not Detected     Coronavirus OC43 Not Detected     COVID19 Not Detected     Human Metapneumovirus Not Detected     Human Rhinovirus/Enterovirus Not Detected     Influenza A PCR Not Detected     Influenza B PCR Not Detected     Parainfluenza Virus 1 Not Detected     Parainfluenza Virus 2 Not Detected     Parainfluenza Virus 3 Not Detected      Parainfluenza Virus 4 Not Detected     RSV, PCR Not Detected     Bordetella pertussis pcr Not Detected     Bordetella parapertussis PCR Not Detected     Chlamydophila pneumoniae PCR Not Detected     Mycoplasma pneumo by PCR Not Detected    Narrative:      In the setting of a positive respiratory panel with a viral infection PLUS a negative procalcitonin without other underlying concern for bacterial infection, consider observing off antibiotics or discontinuation of antibiotics and continue supportive care. If the respiratory panel is positive for atypical bacterial infection (Bordetella pertussis, Chlamydophila pneumoniae, or Mycoplasma pneumoniae), consider antibiotic de-escalation to target atypical bacterial infection.    S. Pneumo Ag Urine or CSF - Urine, Urine, Clean Catch [[id number]]  (Normal) Collected: 05/18/24 2110    Lab Status: Final result Specimen: Urine, Clean Catch Updated: 05/18/24 2151     Strep Pneumo Ag Negative    Legionella Antigen, Urine - Urine, Urine, Clean Catch [[id number]]  (Normal) Collected: 05/18/24 2110    Lab Status: Final result Specimen: Urine, Clean Catch Updated: 05/18/24 2151     LEGIONELLA ANTIGEN, URINE Negative    Blood Culture - Blood, Arm, Left [[id number]]  (Abnormal) Collected: 05/18/24 2046    Lab Status: Edited Result - FINAL Specimen: Blood from Arm, Left Updated: 05/22/24 0706     Blood Culture Staphylococcus, coagulase negative     Isolated from Aerobic and Anaerobic Bottles     Gram Stain Aerobic Bottle Gram positive cocci in clusters      Anaerobic Bottle Gram positive cocci in clusters     Comment: Appended report. These results have been appended to a previously final verified report.       Narrative:      Probable contaminant requires clinical correlation, susceptibility not performed unless requested by physician.      Blood Culture ID, PCR - Blood, Arm, Left [[id number]]  (Abnormal) Collected: 05/18/24 2046    Lab Status: Final result Specimen: Blood from Arm,  Left Updated: 05/19/24 2001     BCID, PCR Staph spp, not aureus or lugdunensis. Identification by BCID2 PCR.     BOTTLE TYPE Aerobic Bottle    Blood Culture - Blood, Arm, Left [[id number]]  (Normal) Collected: 05/18/24 2025    Lab Status: Final result Specimen: Blood from Arm, Left Updated: 05/23/24 2045     Blood Culture No growth at 5 days          [x]  Radiology  XR Chest 1 View    Result Date: 5/20/2024  Lungs are slightly better aerated than on prior exam, suggesting improving pneumonia or pulmonary edema.   Electronically Signed By-Theo Castaneda MD On:5/20/2024 3:14 PM      XR Abdomen KUB    Result Date: 5/19/2024  Impression: No evidence of stool impaction or obstruction   Electronically Signed By-Darrick Gomez On:5/19/2024 6:19 PM      XR Chest 1 View    Result Date: 5/18/2024  The endotracheal (ET) tube, nasogastric (NG) tube, and right IJ central venous line are thought to be in satisfactory position. Bilateral airspace opacities are present and may represent infectious multifocal pneumonia. No pneumothorax is seen. Please see above comments for further detail.    Please note that portions of this note were completed with a voice recognition program.     Electronically Signed By-Herberth Arevalo MD On:5/18/2024 8:34 PM      MRI Brain Without Contrast    Result Date: 5/18/2024  Impression:  1. No acute process 2. Parenchymal atrophy and chronic small vessel ischemic white matter changes    Electronically Signed BySocorro Gomez On:5/18/2024 5:55 PM     []  EKG/Telemetry   []  Cardiology/Vascular   []  Pathology  []  Old records  []  Other:    Assessment & Plan   Assessment / Plan     Assessment:  Hepatic encephalopathy  Seizure  Hyperammonemia  S/p mechanical ventilation for airway protection, self extubated on 5/20  Decompensated ROMO cirrhosis  Staph bacteremia, likely contamination  Chronic thrombocytopenia  Chronic anemia  Frontal lobe atrophy  History of GI bleed secondary to peptic ulcer  History of  GERD without esophagitis  Essential hypertension  Insulin-dependent type 2 diabetes  Anxiety and depression  Morbid obesity BMI 43    Plan:  Patient currently being managed in medicine service.  Self extubated on 5/20, saturating well on room air.  Patient awake and alert; but more confused today.  Likely given he is having multiple bowel movements and ammonia level normal.    On lactulose to 30 g, 4 times daily; continue.  Patient has been refusing medications, core track was placed but patient removed it today.  Initially refused but now agreeing on taking medication.  Ammonia level 21 today.  ABG was 5/24 obtained which was not significant  Continue rifaximin.  Still confused.  Obtaining CT head.  Very restless still obtain MRI brain currently.  Also ordering EEG given patient has seizure activity at this hospitalization though his last EEG showed no epileptiform discharges.  Blood culture 5/18 grew gram-positive cocci, staph species not aureus or lugdunensis in 1 set of blood culture;.  Blood culture sent on 5/20, showing no growth at 4-5 days.  Likely contaminant.  Currently saturating well on room air.  On IV Keppra 500 mg every 6 hours, continue.  EEG on 5/20 showed no epileptiform discharges.  Neurology and pulmonology following the patient, appreciate input.  Currently on restraints to prevent self-harm and pulling of tubes/lines.  Patient is also combative at times.  In as needed IV haldol for agitation.  Hemoglobin 9.5, iron panel sent showed showed iron deficiency.  S/p 3 doses of IV iron.  Started on p.o. iron 325 mg daily.    Speech/swallow evaluated the patient, recommended mechanical soft diet, thin liquid.  Diet ordered.  Currently has bedhold at [facility], patient LTC resident there.  Clinical course to determine disposition.    Discussed with RN.    DVT prophylaxis:  Medical and mechanical DVT prophylaxis orders are present.        CODE STATUS:   Medical Intervention Limits: NO intubation  (DNI)  Level Of Support Discussed With: Next of Kin (If No Surrogate)  Code Status (Patient has no pulse and is not breathing): No CPR (Do Not Attempt to Resuscitate)  Medical Interventions (Patient has pulse or is breathing): Limited Support      Electronically signed by Rinku Gao MD, 05/25/24, 10:49 AM EDT.

## 2024-05-25 NOTE — PROGRESS NOTES
Nutrition Services    Patient Name: Nic Nicolas  YOB: 1965  MRN: [id number]  Admission date: 5/13/2024    PROGRESS NOTE      Encounter Information: EN Follow Up       PO Diet: Diet: Cardiac, Diabetic; Healthy Heart (2-3 Na+); Consistent Carbohydrate; Texture: Mechanical Ground (NDD 2); Fluid Consistency: Thin (IDDSI 0)   PO Supplements: None documented    PO Intake:         Current nutrition support: Bolus 7 cartons of Diabetisource AC daily     2 cartons @ 8am  2 cartons @ 11 am  1 carton @ 2 pm  2 cartons @ 5 pm  Flush with 35 ml H2O before and after each bolus (8 times=240 ml)     Provides: 2100 kcal, 105 g pro, 1708 ml        Estimated Needs: 2225 kcal, 89 g pro, 1700 ml        Nutrition support review: Pt removed Cortrak.        Labs (reviewed below): Electrolytes WDL        GI Function:  Continues on lactulose. Last BM noted on 5/23       Nutrition Intervention Updates: Pt self removed Cortrak while in restraints. PO diet per SLP. Per Primary RN, little to no PO intake yesterday. RD will continue to monitor for POC.      Results from last 7 days   Lab Units 05/25/24  0349 05/24/24  0401 05/23/24  0425 05/22/24  0419 05/21/24  0518   SODIUM mmol/L 145 143 142 135* 135*   POTASSIUM mmol/L 3.8 3.6 3.8 3.8 3.9   CHLORIDE mmol/L 117* 116* 110* 107 104   CO2 mmol/L 17.6* 19.0* 21.0* 18.1* 23.0   BUN mg/dL 23* 28* 34* 38* 45*   CREATININE mg/dL 0.77 0.82 0.78 1.06 1.22   CALCIUM mg/dL 8.8 8.5* 8.5* 8.2* 8.2*   BILIRUBIN mg/dL  --   --  1.1 1.1 1.3*   ALK PHOS U/L  --   --  70 75 67   ALT (SGPT) U/L  --   --  17 20 18   AST (SGOT) U/L  --   --  30 35 36   GLUCOSE mg/dL 133* 220* 166* 214* 200*     Results from last 7 days   Lab Units 05/25/24  0349 05/24/24  0401 05/23/24  0425   MAGNESIUM mg/dL 2.1 2.4 2.4   PHOSPHORUS mg/dL 2.9 2.3* 2.3*   HEMOGLOBIN g/dL 10.3* 9.5* 9.5*   HEMATOCRIT % 33.0* 30.2* 30.5*     COVID19   Date Value Ref Range Status   05/18/2024 Not Detected Not Detected - Ref. Range  Final     Lab Results   Component Value Date    HGBA1C 7.10 (H) 01/31/2024       RD to follow up per protocol.    Electronically signed by:  Shraddha Amos RD  05/25/24 08:40 EDT

## 2024-05-25 NOTE — NURSING NOTE
Patient is being transferred to room 3015. Patients daughter called and notified and updated on plan of care. Report called and given to USMAN Chang.     HUNTER KathleenN, RN

## 2024-05-26 LAB
A FLAVUS AB SER QL ID: NEGATIVE
A FUMIGATUS AB SER QL ID: NEGATIVE
A NIGER AB SER QL ID: NEGATIVE
AMMONIA BLD-SCNC: 48 UMOL/L (ref 16–60)
ANION GAP SERPL CALCULATED.3IONS-SCNC: 10.2 MMOL/L (ref 5–15)
BACTERIA SPEC AEROBE CULT: NORMAL
BASOPHILS # BLD AUTO: 0.03 10*3/MM3 (ref 0–0.2)
BASOPHILS NFR BLD AUTO: 0.8 % (ref 0–1.5)
BUN SERPL-MCNC: 23 MG/DL (ref 6–20)
BUN/CREAT SERPL: 29.1 (ref 7–25)
CALCIUM SPEC-SCNC: 8.6 MG/DL (ref 8.6–10.5)
CHLORIDE SERPL-SCNC: 117 MMOL/L (ref 98–107)
CO2 SERPL-SCNC: 17.8 MMOL/L (ref 22–29)
CREAT SERPL-MCNC: 0.79 MG/DL (ref 0.76–1.27)
DEPRECATED RDW RBC AUTO: 66.3 FL (ref 37–54)
EGFRCR SERPLBLD CKD-EPI 2021: 103 ML/MIN/1.73
EOSINOPHIL # BLD AUTO: 0.2 10*3/MM3 (ref 0–0.4)
EOSINOPHIL NFR BLD AUTO: 5.4 % (ref 0.3–6.2)
ERYTHROCYTE [DISTWIDTH] IN BLOOD BY AUTOMATED COUNT: 19.9 % (ref 12.3–15.4)
GLUCOSE BLDC GLUCOMTR-MCNC: 123 MG/DL (ref 70–99)
GLUCOSE BLDC GLUCOMTR-MCNC: 128 MG/DL (ref 70–99)
GLUCOSE BLDC GLUCOMTR-MCNC: 146 MG/DL (ref 70–99)
GLUCOSE BLDC GLUCOMTR-MCNC: 153 MG/DL (ref 70–99)
GLUCOSE SERPL-MCNC: 120 MG/DL (ref 65–99)
HCT VFR BLD AUTO: 32.4 % (ref 37.5–51)
HGB BLD-MCNC: 10 G/DL (ref 13–17.7)
IMM GRANULOCYTES # BLD AUTO: 0.01 10*3/MM3 (ref 0–0.05)
IMM GRANULOCYTES NFR BLD AUTO: 0.3 % (ref 0–0.5)
LYMPHOCYTES # BLD AUTO: 0.81 10*3/MM3 (ref 0.7–3.1)
LYMPHOCYTES NFR BLD AUTO: 22.1 % (ref 19.6–45.3)
MAGNESIUM SERPL-MCNC: 2.1 MG/DL (ref 1.6–2.6)
MCH RBC QN AUTO: 28.5 PG (ref 26.6–33)
MCHC RBC AUTO-ENTMCNC: 30.9 G/DL (ref 31.5–35.7)
MCV RBC AUTO: 92.3 FL (ref 79–97)
MONOCYTES # BLD AUTO: 0.33 10*3/MM3 (ref 0.1–0.9)
MONOCYTES NFR BLD AUTO: 9 % (ref 5–12)
NEUTROPHILS NFR BLD AUTO: 2.29 10*3/MM3 (ref 1.7–7)
NEUTROPHILS NFR BLD AUTO: 62.4 % (ref 42.7–76)
NRBC BLD AUTO-RTO: 0 /100 WBC (ref 0–0.2)
PHOSPHATE SERPL-MCNC: 3.7 MG/DL (ref 2.5–4.5)
PLATELET # BLD AUTO: 61 10*3/MM3 (ref 140–450)
PMV BLD AUTO: 9.9 FL (ref 6–12)
POTASSIUM SERPL-SCNC: 4.4 MMOL/L (ref 3.5–5.2)
RBC # BLD AUTO: 3.51 10*6/MM3 (ref 4.14–5.8)
SODIUM SERPL-SCNC: 145 MMOL/L (ref 136–145)
WBC NRBC COR # BLD AUTO: 3.67 10*3/MM3 (ref 3.4–10.8)

## 2024-05-26 PROCEDURE — 84100 ASSAY OF PHOSPHORUS: CPT | Performed by: STUDENT IN AN ORGANIZED HEALTH CARE EDUCATION/TRAINING PROGRAM

## 2024-05-26 PROCEDURE — 83735 ASSAY OF MAGNESIUM: CPT | Performed by: STUDENT IN AN ORGANIZED HEALTH CARE EDUCATION/TRAINING PROGRAM

## 2024-05-26 PROCEDURE — 63710000001 INSULIN LISPRO (HUMAN) PER 5 UNITS: Performed by: INTERNAL MEDICINE

## 2024-05-26 PROCEDURE — 99232 SBSQ HOSP IP/OBS MODERATE 35: CPT | Performed by: STUDENT IN AN ORGANIZED HEALTH CARE EDUCATION/TRAINING PROGRAM

## 2024-05-26 PROCEDURE — 82948 REAGENT STRIP/BLOOD GLUCOSE: CPT | Performed by: INTERNAL MEDICINE

## 2024-05-26 PROCEDURE — 25010000002 ENOXAPARIN PER 10 MG: Performed by: STUDENT IN AN ORGANIZED HEALTH CARE EDUCATION/TRAINING PROGRAM

## 2024-05-26 PROCEDURE — 82140 ASSAY OF AMMONIA: CPT | Performed by: STUDENT IN AN ORGANIZED HEALTH CARE EDUCATION/TRAINING PROGRAM

## 2024-05-26 PROCEDURE — 25010000002 LEVETIRACETAM IN NACL 0.82% 500 MG/100ML SOLUTION: Performed by: PSYCHIATRY & NEUROLOGY

## 2024-05-26 PROCEDURE — 94799 UNLISTED PULMONARY SVC/PX: CPT

## 2024-05-26 PROCEDURE — 80048 BASIC METABOLIC PNL TOTAL CA: CPT | Performed by: STUDENT IN AN ORGANIZED HEALTH CARE EDUCATION/TRAINING PROGRAM

## 2024-05-26 PROCEDURE — 82948 REAGENT STRIP/BLOOD GLUCOSE: CPT

## 2024-05-26 PROCEDURE — 85025 COMPLETE CBC W/AUTO DIFF WBC: CPT | Performed by: STUDENT IN AN ORGANIZED HEALTH CARE EDUCATION/TRAINING PROGRAM

## 2024-05-26 RX ORDER — OXYCODONE HYDROCHLORIDE 5 MG/1
10 TABLET ORAL EVERY 6 HOURS PRN
Status: DISCONTINUED | OUTPATIENT
Start: 2024-05-26 | End: 2024-05-29 | Stop reason: HOSPADM

## 2024-05-26 RX ORDER — LACTULOSE 10 G/15ML
30 SOLUTION ORAL EVERY 6 HOURS SCHEDULED
Status: DISCONTINUED | OUTPATIENT
Start: 2024-05-26 | End: 2024-05-29 | Stop reason: HOSPADM

## 2024-05-26 RX ORDER — UREA 10 %
5 LOTION (ML) TOPICAL NIGHTLY
Status: DISCONTINUED | OUTPATIENT
Start: 2024-05-26 | End: 2024-05-29 | Stop reason: HOSPADM

## 2024-05-26 RX ADMIN — LEVETIRACETAM 500 MG: 5 INJECTION, SOLUTION INTRAVENOUS at 06:32

## 2024-05-26 RX ADMIN — Medication 5 MG: at 20:51

## 2024-05-26 RX ADMIN — ACETAMINOPHEN 650 MG: 325 TABLET ORAL at 01:21

## 2024-05-26 RX ADMIN — LEVETIRACETAM 500 MG: 5 INJECTION, SOLUTION INTRAVENOUS at 18:00

## 2024-05-26 RX ADMIN — SUCRALFATE 1 G: 1 TABLET ORAL at 16:31

## 2024-05-26 RX ADMIN — RIFAXIMIN 550 MG: 550 TABLET ORAL at 08:26

## 2024-05-26 RX ADMIN — LACTULOSE 30 G: 20 SOLUTION ORAL at 08:25

## 2024-05-26 RX ADMIN — LEVETIRACETAM 500 MG: 5 INJECTION, SOLUTION INTRAVENOUS at 14:29

## 2024-05-26 RX ADMIN — HYDROCORTISONE 1 APPLICATION: 1 OINTMENT TOPICAL at 10:35

## 2024-05-26 RX ADMIN — LACTULOSE 30 G: 20 SOLUTION ORAL at 00:59

## 2024-05-26 RX ADMIN — LACTULOSE 30 G: 20 SOLUTION ORAL at 05:24

## 2024-05-26 RX ADMIN — BUSPIRONE HYDROCHLORIDE 7.5 MG: 15 TABLET ORAL at 08:26

## 2024-05-26 RX ADMIN — OXYCODONE 10 MG: 5 TABLET ORAL at 20:52

## 2024-05-26 RX ADMIN — ENOXAPARIN SODIUM 40 MG: 100 INJECTION SUBCUTANEOUS at 11:58

## 2024-05-26 RX ADMIN — PANTOPRAZOLE SODIUM 40 MG: 40 INJECTION, POWDER, FOR SOLUTION INTRAVENOUS at 06:32

## 2024-05-26 RX ADMIN — FERROUS SULFATE TAB 325 MG (65 MG ELEMENTAL FE) 325 MG: 325 (65 FE) TAB at 08:26

## 2024-05-26 RX ADMIN — HYDROCORTISONE 1 APPLICATION: 1 OINTMENT TOPICAL at 16:32

## 2024-05-26 RX ADMIN — FUROSEMIDE 40 MG: 40 TABLET ORAL at 08:26

## 2024-05-26 RX ADMIN — SERTRALINE HYDROCHLORIDE 100 MG: 100 TABLET, FILM COATED ORAL at 20:50

## 2024-05-26 RX ADMIN — SUCRALFATE 1 G: 1 TABLET ORAL at 08:26

## 2024-05-26 RX ADMIN — HYDROCORTISONE 1 APPLICATION: 1 OINTMENT TOPICAL at 21:02

## 2024-05-26 RX ADMIN — ENOXAPARIN SODIUM 40 MG: 100 INJECTION SUBCUTANEOUS at 23:19

## 2024-05-26 RX ADMIN — Medication 1000 UNITS: at 08:26

## 2024-05-26 RX ADMIN — CARVEDILOL 6.25 MG: 6.25 TABLET, FILM COATED ORAL at 08:27

## 2024-05-26 RX ADMIN — LACTULOSE 30 G: 20 SOLUTION ORAL at 11:58

## 2024-05-26 RX ADMIN — SENNOSIDES AND DOCUSATE SODIUM 2 TABLET: 50; 8.6 TABLET ORAL at 08:26

## 2024-05-26 RX ADMIN — SUCRALFATE 1 G: 1 TABLET ORAL at 11:58

## 2024-05-26 RX ADMIN — ACETAMINOPHEN 650 MG: 325 TABLET ORAL at 08:39

## 2024-05-26 RX ADMIN — SENNOSIDES AND DOCUSATE SODIUM 2 TABLET: 50; 8.6 TABLET ORAL at 20:50

## 2024-05-26 RX ADMIN — SUCRALFATE 1 G: 1 TABLET ORAL at 20:51

## 2024-05-26 RX ADMIN — Medication 10 ML: at 08:27

## 2024-05-26 RX ADMIN — LACTULOSE 30 G: 20 SOLUTION ORAL at 18:00

## 2024-05-26 RX ADMIN — Medication 10 ML: at 20:50

## 2024-05-26 RX ADMIN — INSULIN LISPRO 2 UNITS: 100 INJECTION, SOLUTION INTRAVENOUS; SUBCUTANEOUS at 08:24

## 2024-05-26 RX ADMIN — ROPINIROLE HYDROCHLORIDE 1 MG: 1 TABLET, FILM COATED ORAL at 20:51

## 2024-05-26 RX ADMIN — OXYCODONE 10 MG: 5 TABLET ORAL at 14:29

## 2024-05-26 RX ADMIN — LEVETIRACETAM 500 MG: 5 INJECTION, SOLUTION INTRAVENOUS at 23:50

## 2024-05-26 RX ADMIN — RIFAXIMIN 550 MG: 550 TABLET ORAL at 20:51

## 2024-05-26 RX ADMIN — LACTULOSE 30 G: 20 SOLUTION ORAL at 23:19

## 2024-05-26 RX ADMIN — BUSPIRONE HYDROCHLORIDE 7.5 MG: 15 TABLET ORAL at 20:51

## 2024-05-26 RX ADMIN — CARVEDILOL 6.25 MG: 6.25 TABLET, FILM COATED ORAL at 18:00

## 2024-05-26 RX ADMIN — BUSPIRONE HYDROCHLORIDE 7.5 MG: 15 TABLET ORAL at 16:31

## 2024-05-26 NOTE — PLAN OF CARE
Goal Outcome Evaluation:              Outcome Evaluation: VSS. Pt A&O x4. D/C restraints. Pt ambulated to bathroom with x1 assist. Pt rested well throughout shift. Continue plan of care.

## 2024-05-26 NOTE — PLAN OF CARE
Goal Outcome Evaluation:   PT alert to self pt cooperative this shift no issues or concerns cont current POC

## 2024-05-26 NOTE — PROGRESS NOTES
[facility]   Hospitalist Progress Note  Date: 2024  Patient Name: Nic Nicolas  : 1965  MRN: [id number]  Date of admission: 2024  [place] [facility] [place] [facility] [place]      Subjective   Subjective     Chief Complaint: Follow up for confusion     Summary:Nic Nicolas is a 59 y.o. male th cirrhosis of the liver due to ROMO can have a hepatic encephalopathy, DVT in the past, diabetes on insulin, hypertension, obstructive sleep apnea, TBI, and asthma/COPD who presents with confusion. VSS. CBC shows no leukocytosis but does show a hemoglobin of 10.7 which is above his baseline.  CMP shows a slightly elevated bilirubin at 2.4.  Ammonia level is 96.  UA shows moderate blood, positive nitrite, small leuk esterase trace bacteria.  CT of the head shows global/frontal lobe atrophy.  Patient was given 2 g of ceftriaxone and started on lactulose q4 hours.  Gradual improvement in mentation.  Ammonia normalized.  No ascites on ultrasound.  Completed Rocephin.  Had CODE BLUE called after getting MRI of the brain where he became cyanotic with agonal breathing but never lost a pulse.  Had multiple seizures and had to be intubated for airway protection.  On Keppra for seizures.  Patient was started on lactulose.  Was on propofol/fentanyl drip for sedation due to seizure activities.  Patient self extubated on .  Saturating in room air thereafter.  Was also on Levophed for pressor support.  Eventually patient was weaned off Levophed and blood pressure has been stable.  Sedation was discontinued.  Also had multiple bowel movements after which she was more awake and alert.  He was transferred out of critical care unit on .  Mentation improving.  Patient still on restraints to prevent him from pulling out the core track.  Speech evaluated the patient and started on diet. His mentation was improving but again started deterioration. Pulled his cortrak and rectal tube twice.  Was placed on restraints . Also received as  needed haldol for agitation.  Speech evaluated the patient and cleared for diet, but NG tube was placed again given patient was refusing medication including lactulose.    Interval Followup:   Patient is sitting in bed comfortably, off restraints.  Eating his lunch.  Very pleasant today.  Alert, awake and oriented x 3.  Stated he feels good but is complaining of some superficial right-sided chest pain where his large bruises present.  He is on oxycodone 10 mg as needed every 6 hours as outpatient.  Reordered.  Denies any fever, chills, rigors, chest pain,, difficulty breathing or abdominal pain.    Review of Systems    All systems reviewed and negative except for what is outlined above.      Objective   Objective     Vitals:   Temp:  [97.2 °F (36.2 °C)-98 °F (36.7 °C)] 97.8 °F (36.6 °C)  Heart Rate:  [63-87] 63  Resp:  [17-22] 18  BP: (135-153)/(57-70) 141/70    Physical Exam   General: Awake and alert; NAD.  Cardiovascular: RRR, no murmurs   Pulmonary: CTA bilaterally; no wheezes; no conversational dyspnea  Gastrointestinal: S/ND/NT, +BS  Neuro: Awake, alert, oriented x 3.  Off restraints.  Very cooperative and pleasant today.    Result Review    Result Review:  I have personally reviewed these results:  [x]  Laboratory      Lab 05/26/24  0628 05/25/24  0349 05/24/24  0401   WBC 3.67 5.18 4.04   HEMOGLOBIN 10.0* 10.3* 9.5*   HEMATOCRIT 32.4* 33.0* 30.2*   PLATELETS 61* 82* 73*   NEUTROS ABS 2.29 3.40 2.64   IMMATURE GRANS (ABS) 0.01 0.01 0.01   LYMPHS ABS 0.81 1.01 0.76   MONOS ABS 0.33 0.52 0.45   EOS ABS 0.20 0.21 0.16   MCV 92.3 91.2 90.4         Lab 05/26/24  0628 05/25/24  0349 05/24/24  0401   SODIUM 145 145 143   POTASSIUM 4.4 3.8 3.6   CHLORIDE 117* 117* 116*   CO2 17.8* 17.6* 19.0*   ANION GAP 10.2 10.4 8.0   BUN 23* 23* 28*   CREATININE 0.79 0.77 0.82   EGFR 103.0 103.8 101.8   GLUCOSE 120* 133* 220*   CALCIUM 8.6 8.8 8.5*   MAGNESIUM 2.1 2.1 2.4   PHOSPHORUS 3.7 2.9 2.3*         Lab 05/23/24  0425  05/22/24  0419 05/21/24  0518   TOTAL PROTEIN 5.9* 5.5* 5.7*   ALBUMIN 2.9* 2.8* 2.8*   GLOBULIN 3.0 2.7 2.9   ALT (SGPT) 17 20 18   AST (SGOT) 30 35 36   BILIRUBIN 1.1 1.1 1.3*   ALK PHOS 70 75 67                 Lab 05/25/24  0906 05/22/24  0419 05/21/24  0518   IRON  --   --  35*   IRON SATURATION (TSAT)  --   --  11*   TIBC  --   --  326   TRANSFERRIN  --   --  219   FERRITIN  --  67.40  --    FOLATE 10.20  --   --    VITAMIN B 12 1,048*  --   --          Lab 05/24/24  1422   PH, ARTERIAL 7.361   PCO2, ARTERIAL 37.3   PO2 ART 73.3*   O2 SATURATION ART 92.3*   FIO2 21   HCO3 ART 20.6*   BASE EXCESS ART -4.3*   CARBOXYHEMOGLOBIN 0.3     Brief Urine Lab Results  (Last result in the past 365 days)        Color   Clarity   Blood   Leuk Est   Nitrite   Protein   CREAT   Urine HCG        05/18/24 2110 Yellow   Clear   Negative   Negative   Negative   Negative                 [x]  Microbiology   Microbiology Results (last 10 days)       Procedure Component Value - Date/Time    Blood Culture - Blood, Arm, Right [[id number]]  (Normal) Collected: 05/20/24 1220    Lab Status: Final result Specimen: Blood from Arm, Right Updated: 05/26/24 0701     Blood Culture No growth at 5 days    Blood Culture - Blood, Hand, Left [[id number]]  (Normal) Collected: 05/20/24 1206    Lab Status: Final result Specimen: Blood from Hand, Left Updated: 05/25/24 1246     Blood Culture No growth at 5 days    Respiratory Culture - Sputum, ET Suction [[id number]] Collected: 05/18/24 2126    Lab Status: Final result Specimen: Sputum from ET Suction Updated: 05/20/24 1056     Respiratory Culture Scant growth (1+) Normal respiratory marion. No S. aureus or Pseudomonas aeruginosa detected. Final report.     Gram Stain Moderate (3+) WBCs seen      Moderate (3+) Epithelial cells seen      Few (2+) Gram positive cocci in pairs, chains and clusters    MRSA Screen, PCR (Inpatient) - Swab, Nares [[id number]]  (Normal) Collected: 05/18/24 2112    Lab Status: Final  result Specimen: Swab from Nares Updated: 05/18/24 2235     MRSA PCR No MRSA Detected    Narrative:      The negative predictive value of this diagnostic test is high and should only be used to consider de-escalating anti-MRSA therapy. A positive result may indicate colonization with MRSA and must be correlated clinically.    Respiratory Panel PCR w/COVID-19(SARS-CoV-2) BRENDAN/LE/CATRACHITA/PAD/COR/ALFREDO In-House, NP Swab in UTM/VTM, 2 HR TAT - Swab, Nasopharynx [[id number]]  (Normal) Collected: 05/18/24 2112    Lab Status: Final result Specimen: Swab from Nasopharynx Updated: 05/19/24 0655     ADENOVIRUS, PCR Not Detected     Coronavirus 229E Not Detected     Coronavirus HKU1 Not Detected     Coronavirus NL63 Not Detected     Coronavirus OC43 Not Detected     COVID19 Not Detected     Human Metapneumovirus Not Detected     Human Rhinovirus/Enterovirus Not Detected     Influenza A PCR Not Detected     Influenza B PCR Not Detected     Parainfluenza Virus 1 Not Detected     Parainfluenza Virus 2 Not Detected     Parainfluenza Virus 3 Not Detected     Parainfluenza Virus 4 Not Detected     RSV, PCR Not Detected     Bordetella pertussis pcr Not Detected     Bordetella parapertussis PCR Not Detected     Chlamydophila pneumoniae PCR Not Detected     Mycoplasma pneumo by PCR Not Detected    Narrative:      In the setting of a positive respiratory panel with a viral infection PLUS a negative procalcitonin without other underlying concern for bacterial infection, consider observing off antibiotics or discontinuation of antibiotics and continue supportive care. If the respiratory panel is positive for atypical bacterial infection (Bordetella pertussis, Chlamydophila pneumoniae, or Mycoplasma pneumoniae), consider antibiotic de-escalation to target atypical bacterial infection.    S. Pneumo Ag Urine or CSF - Urine, Urine, Clean Catch [[id number]]  (Normal) Collected: 05/18/24 2110    Lab Status: Final result Specimen: Urine, Clean Catch  Updated: 05/18/24 2151     Strep Pneumo Ag Negative    Legionella Antigen, Urine - Urine, Urine, Clean Catch [[id number]]  (Normal) Collected: 05/18/24 2110    Lab Status: Final result Specimen: Urine, Clean Catch Updated: 05/18/24 2151     LEGIONELLA ANTIGEN, URINE Negative    Blood Culture - Blood, Arm, Left [[id number]]  (Abnormal) Collected: 05/18/24 2046    Lab Status: Edited Result - FINAL Specimen: Blood from Arm, Left Updated: 05/22/24 0706     Blood Culture Staphylococcus, coagulase negative     Isolated from Aerobic and Anaerobic Bottles     Gram Stain Aerobic Bottle Gram positive cocci in clusters      Anaerobic Bottle Gram positive cocci in clusters     Comment: Appended report. These results have been appended to a previously final verified report.       Narrative:      Probable contaminant requires clinical correlation, susceptibility not performed unless requested by physician.      Blood Culture ID, PCR - Blood, Arm, Left [[id number]]  (Abnormal) Collected: 05/18/24 2046    Lab Status: Final result Specimen: Blood from Arm, Left Updated: 05/19/24 2001     BCID, PCR Staph spp, not aureus or lugdunensis. Identification by BCID2 PCR.     BOTTLE TYPE Aerobic Bottle    Blood Culture - Blood, Arm, Left [[id number]]  (Normal) Collected: 05/18/24 2025    Lab Status: Final result Specimen: Blood from Arm, Left Updated: 05/23/24 2045     Blood Culture No growth at 5 days          [x]  Radiology  CT Abdomen Pelvis Without Contrast    Result Date: 5/25/2024   1. Cirrhosis and splenomegaly. No evidence ascites at this time. 2. No clearly acute findings in the GI tract. The appendix is normal. 3. Air within the urinary bladder lumen could be due to recent instrumentation. Correlate with history. The bladder is otherwise normal. No hydronephrosis. 4. Soft tissue density in the right inguinal canal is probably the right testicle. Correlate with physical exam. 5. Additional nonacute findings as above.      Electronically  Signed By-Dr. Kye Breaux MD On:5/25/2024 9:39 PM      CT Head Without Contrast    Result Date: 5/25/2024  Chronic senescent changes. No acute findings.    Electronically Signed By-Dr. Kye Breaux MD On:5/25/2024 7:14 PM      XR Chest 1 View    Result Date: 5/20/2024  Lungs are slightly better aerated than on prior exam, suggesting improving pneumonia or pulmonary edema.   Electronically Signed By-Theo Castaneda MD On:5/20/2024 3:14 PM      XR Abdomen KUB    Result Date: 5/19/2024  Impression: No evidence of stool impaction or obstruction   Electronically Signed By-Darrick Gomez On:5/19/2024 6:19 PM      XR Chest 1 View    Result Date: 5/18/2024  The endotracheal (ET) tube, nasogastric (NG) tube, and right IJ central venous line are thought to be in satisfactory position. Bilateral airspace opacities are present and may represent infectious multifocal pneumonia. No pneumothorax is seen. Please see above comments for further detail.    Please note that portions of this note were completed with a voice recognition program.     Electronically Signed By-Herberth Arevalo MD On:5/18/2024 8:34 PM      MRI Brain Without Contrast    Result Date: 5/18/2024  Impression:  1. No acute process 2. Parenchymal atrophy and chronic small vessel ischemic white matter changes    Electronically Signed By-Darrick Gomez On:5/18/2024 5:55 PM     []  EKG/Telemetry   []  Cardiology/Vascular   []  Pathology  []  Old records  []  Other:    Assessment & Plan   Assessment / Plan     Assessment:  Hepatic encephalopathy  Seizure  Hyperammonemia  S/p mechanical ventilation for airway protection, self extubated on 5/20  Decompensated ROMO cirrhosis  Staph bacteremia, likely contamination  Chronic thrombocytopenia  Chronic anemia  Frontal lobe atrophy  History of GI bleed secondary to peptic ulcer  History of GERD without esophagitis  Essential hypertension  Insulin-dependent type 2 diabetes  Anxiety and depression  Morbid obesity BMI  43    Plan:  Patient currently being managed in medicine service.  Self extubated on 5/20, saturating well on room air.  Patient was confused but awake, alert and oriented x 3 today.    Ammonia 48.  Change lactulose to 30 g every 6 hours.   Ammonia level 48 today.  Patient was confused this hospitalization even with normal ammonia for which CT head was obtained which only showed chronic senescent changes.  CT abdomen showed cirrhosis and splenomegaly with no evidence of ascites.  Continue rifaximin.  Blood culture 5/18 grew gram-positive cocci, staph species not aureus or lugdunensis in 1 set of blood culture;.  Blood culture sent on 5/20, showing no growth at 5 days.  Likely contaminant.  Currently saturating well on room air.  On IV Keppra 500 mg every 6 hours, continue.  EEG on 5/20 showed no epileptiform discharges.  Neurology and pulmonology following the patient, appreciate input.  Off restraints.  On as needed IV haldol for agitation.  Hemoglobin 9.5, iron panel sent showed showed iron deficiency.  S/p 3 doses of IV iron.  Started on p.o. iron 325 mg daily.    Speech/swallow evaluated the patient, recommended mechanical soft diet, thin liquid.  Diet ordered.  Tolerating well.  Currently has bedhold at [facility], patient LTC resident there.  Clinical course to determine disposition.  Likely discharge in next 24-48 hours if clinically stable.      DVT prophylaxis:  Medical and mechanical DVT prophylaxis orders are present.        CODE STATUS:   Medical Intervention Limits: NO intubation (DNI)  Level Of Support Discussed With: Next of Kin (If No Surrogate)  Code Status (Patient has no pulse and is not breathing): No CPR (Do Not Attempt to Resuscitate)  Medical Interventions (Patient has pulse or is breathing): Limited Support      Electronically signed by Rinku Gao MD, 05/26/24, 10:49 AM EDT.

## 2024-05-27 ENCOUNTER — APPOINTMENT (OUTPATIENT)
Dept: CT IMAGING | Facility: HOSPITAL | Age: 59
End: 2024-05-27
Payer: MEDICAID

## 2024-05-27 LAB
AMMONIA BLD-SCNC: 50 UMOL/L (ref 16–60)
ANION GAP SERPL CALCULATED.3IONS-SCNC: 11.5 MMOL/L (ref 5–15)
BASOPHILS # BLD AUTO: 0.05 10*3/MM3 (ref 0–0.2)
BASOPHILS NFR BLD AUTO: 1.4 % (ref 0–1.5)
BUN SERPL-MCNC: 21 MG/DL (ref 6–20)
BUN/CREAT SERPL: 23.9 (ref 7–25)
CALCIUM SPEC-SCNC: 8.6 MG/DL (ref 8.6–10.5)
CHLORIDE SERPL-SCNC: 112 MMOL/L (ref 98–107)
CO2 SERPL-SCNC: 18.5 MMOL/L (ref 22–29)
CREAT SERPL-MCNC: 0.88 MG/DL (ref 0.76–1.27)
DEPRECATED RDW RBC AUTO: 64.9 FL (ref 37–54)
EGFRCR SERPLBLD CKD-EPI 2021: 99.7 ML/MIN/1.73
EOSINOPHIL # BLD AUTO: 0.2 10*3/MM3 (ref 0–0.4)
EOSINOPHIL NFR BLD AUTO: 5.4 % (ref 0.3–6.2)
ERYTHROCYTE [DISTWIDTH] IN BLOOD BY AUTOMATED COUNT: 19.2 % (ref 12.3–15.4)
GLUCOSE BLDC GLUCOMTR-MCNC: 116 MG/DL (ref 70–99)
GLUCOSE BLDC GLUCOMTR-MCNC: 127 MG/DL (ref 70–99)
GLUCOSE BLDC GLUCOMTR-MCNC: 147 MG/DL (ref 70–99)
GLUCOSE BLDC GLUCOMTR-MCNC: 90 MG/DL (ref 70–99)
GLUCOSE SERPL-MCNC: 102 MG/DL (ref 65–99)
HCT VFR BLD AUTO: 35.4 % (ref 37.5–51)
HGB BLD-MCNC: 11 G/DL (ref 13–17.7)
IMM GRANULOCYTES # BLD AUTO: 0.01 10*3/MM3 (ref 0–0.05)
IMM GRANULOCYTES NFR BLD AUTO: 0.3 % (ref 0–0.5)
LYMPHOCYTES # BLD AUTO: 1.04 10*3/MM3 (ref 0.7–3.1)
LYMPHOCYTES NFR BLD AUTO: 28.2 % (ref 19.6–45.3)
MAGNESIUM SERPL-MCNC: 1.8 MG/DL (ref 1.6–2.6)
MCH RBC QN AUTO: 28.6 PG (ref 26.6–33)
MCHC RBC AUTO-ENTMCNC: 31.1 G/DL (ref 31.5–35.7)
MCV RBC AUTO: 92.2 FL (ref 79–97)
MONOCYTES # BLD AUTO: 0.27 10*3/MM3 (ref 0.1–0.9)
MONOCYTES NFR BLD AUTO: 7.3 % (ref 5–12)
NEUTROPHILS NFR BLD AUTO: 2.12 10*3/MM3 (ref 1.7–7)
NEUTROPHILS NFR BLD AUTO: 57.4 % (ref 42.7–76)
NRBC BLD AUTO-RTO: 0 /100 WBC (ref 0–0.2)
PHOSPHATE SERPL-MCNC: 3.6 MG/DL (ref 2.5–4.5)
PLATELET # BLD AUTO: 67 10*3/MM3 (ref 140–450)
PMV BLD AUTO: 11.4 FL (ref 6–12)
POTASSIUM SERPL-SCNC: 3.8 MMOL/L (ref 3.5–5.2)
RBC # BLD AUTO: 3.84 10*6/MM3 (ref 4.14–5.8)
SODIUM SERPL-SCNC: 142 MMOL/L (ref 136–145)
WBC NRBC COR # BLD AUTO: 3.69 10*3/MM3 (ref 3.4–10.8)

## 2024-05-27 PROCEDURE — 82948 REAGENT STRIP/BLOOD GLUCOSE: CPT

## 2024-05-27 PROCEDURE — 85025 COMPLETE CBC W/AUTO DIFF WBC: CPT | Performed by: STUDENT IN AN ORGANIZED HEALTH CARE EDUCATION/TRAINING PROGRAM

## 2024-05-27 PROCEDURE — 83735 ASSAY OF MAGNESIUM: CPT | Performed by: STUDENT IN AN ORGANIZED HEALTH CARE EDUCATION/TRAINING PROGRAM

## 2024-05-27 PROCEDURE — 94799 UNLISTED PULMONARY SVC/PX: CPT

## 2024-05-27 PROCEDURE — 25010000002 LEVETIRACETAM IN NACL 0.82% 500 MG/100ML SOLUTION: Performed by: PSYCHIATRY & NEUROLOGY

## 2024-05-27 PROCEDURE — 71250 CT THORAX DX C-: CPT

## 2024-05-27 PROCEDURE — 99232 SBSQ HOSP IP/OBS MODERATE 35: CPT | Performed by: STUDENT IN AN ORGANIZED HEALTH CARE EDUCATION/TRAINING PROGRAM

## 2024-05-27 PROCEDURE — 25010000002 ENOXAPARIN PER 10 MG: Performed by: STUDENT IN AN ORGANIZED HEALTH CARE EDUCATION/TRAINING PROGRAM

## 2024-05-27 PROCEDURE — 80048 BASIC METABOLIC PNL TOTAL CA: CPT | Performed by: STUDENT IN AN ORGANIZED HEALTH CARE EDUCATION/TRAINING PROGRAM

## 2024-05-27 PROCEDURE — 82140 ASSAY OF AMMONIA: CPT | Performed by: STUDENT IN AN ORGANIZED HEALTH CARE EDUCATION/TRAINING PROGRAM

## 2024-05-27 PROCEDURE — 84100 ASSAY OF PHOSPHORUS: CPT | Performed by: STUDENT IN AN ORGANIZED HEALTH CARE EDUCATION/TRAINING PROGRAM

## 2024-05-27 RX ADMIN — OXYCODONE 10 MG: 5 TABLET ORAL at 15:00

## 2024-05-27 RX ADMIN — ENOXAPARIN SODIUM 40 MG: 100 INJECTION SUBCUTANEOUS at 23:29

## 2024-05-27 RX ADMIN — OXYCODONE 10 MG: 5 TABLET ORAL at 21:11

## 2024-05-27 RX ADMIN — Medication 10 ML: at 08:23

## 2024-05-27 RX ADMIN — CARVEDILOL 6.25 MG: 6.25 TABLET, FILM COATED ORAL at 08:22

## 2024-05-27 RX ADMIN — LEVETIRACETAM 500 MG: 5 INJECTION, SOLUTION INTRAVENOUS at 05:59

## 2024-05-27 RX ADMIN — LACTULOSE 30 G: 20 SOLUTION ORAL at 17:07

## 2024-05-27 RX ADMIN — BUSPIRONE HYDROCHLORIDE 7.5 MG: 15 TABLET ORAL at 15:01

## 2024-05-27 RX ADMIN — SUCRALFATE 1 G: 1 TABLET ORAL at 11:06

## 2024-05-27 RX ADMIN — SENNOSIDES AND DOCUSATE SODIUM 2 TABLET: 50; 8.6 TABLET ORAL at 21:11

## 2024-05-27 RX ADMIN — OXYCODONE 10 MG: 5 TABLET ORAL at 09:08

## 2024-05-27 RX ADMIN — HYDROCORTISONE 1 APPLICATION: 1 OINTMENT TOPICAL at 15:01

## 2024-05-27 RX ADMIN — LEVETIRACETAM 500 MG: 5 INJECTION, SOLUTION INTRAVENOUS at 23:29

## 2024-05-27 RX ADMIN — OXYCODONE 10 MG: 5 TABLET ORAL at 03:33

## 2024-05-27 RX ADMIN — FERROUS SULFATE TAB 325 MG (65 MG ELEMENTAL FE) 325 MG: 325 (65 FE) TAB at 08:23

## 2024-05-27 RX ADMIN — LACTULOSE 30 G: 20 SOLUTION ORAL at 23:29

## 2024-05-27 RX ADMIN — LACTULOSE 30 G: 20 SOLUTION ORAL at 05:58

## 2024-05-27 RX ADMIN — SUCRALFATE 1 G: 1 TABLET ORAL at 17:07

## 2024-05-27 RX ADMIN — PANTOPRAZOLE SODIUM 40 MG: 40 INJECTION, POWDER, FOR SOLUTION INTRAVENOUS at 05:59

## 2024-05-27 RX ADMIN — RIFAXIMIN 550 MG: 550 TABLET ORAL at 21:10

## 2024-05-27 RX ADMIN — SUCRALFATE 1 G: 1 TABLET ORAL at 21:10

## 2024-05-27 RX ADMIN — Medication 1000 UNITS: at 08:23

## 2024-05-27 RX ADMIN — LEVETIRACETAM 500 MG: 5 INJECTION, SOLUTION INTRAVENOUS at 11:06

## 2024-05-27 RX ADMIN — Medication 10 ML: at 21:13

## 2024-05-27 RX ADMIN — LACTULOSE 30 G: 20 SOLUTION ORAL at 11:06

## 2024-05-27 RX ADMIN — BUSPIRONE HYDROCHLORIDE 7.5 MG: 15 TABLET ORAL at 21:11

## 2024-05-27 RX ADMIN — SUCRALFATE 1 G: 1 TABLET ORAL at 08:23

## 2024-05-27 RX ADMIN — HYDROCORTISONE 1 APPLICATION: 1 OINTMENT TOPICAL at 21:13

## 2024-05-27 RX ADMIN — Medication 5 MG: at 21:10

## 2024-05-27 RX ADMIN — FUROSEMIDE 40 MG: 40 TABLET ORAL at 08:22

## 2024-05-27 RX ADMIN — CARVEDILOL 6.25 MG: 6.25 TABLET, FILM COATED ORAL at 17:07

## 2024-05-27 RX ADMIN — SERTRALINE HYDROCHLORIDE 100 MG: 100 TABLET, FILM COATED ORAL at 21:10

## 2024-05-27 RX ADMIN — BUSPIRONE HYDROCHLORIDE 7.5 MG: 15 TABLET ORAL at 08:22

## 2024-05-27 RX ADMIN — ENOXAPARIN SODIUM 40 MG: 100 INJECTION SUBCUTANEOUS at 11:06

## 2024-05-27 RX ADMIN — HYDROCORTISONE 1 APPLICATION: 1 OINTMENT TOPICAL at 09:09

## 2024-05-27 RX ADMIN — RIFAXIMIN 550 MG: 550 TABLET ORAL at 08:26

## 2024-05-27 RX ADMIN — LEVETIRACETAM 500 MG: 5 INJECTION, SOLUTION INTRAVENOUS at 17:07

## 2024-05-27 RX ADMIN — ROPINIROLE HYDROCHLORIDE 1 MG: 1 TABLET, FILM COATED ORAL at 21:10
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
## 2024-05-27 NOTE — PROGRESS NOTES
[facility]   Hospitalist Progress Note  Date: 2024  Patient Name: Nic Nicolas  : 1965  MRN: [id number]  Date of admission: 2024  Room/Bed: [facility]      Subjective   Subjective     Chief Complaint: Follow up for confusion     Summary:Nic Nicolas is a 59 y.o. male th cirrhosis of the liver due to ROMO can have a hepatic encephalopathy, DVT in the past, diabetes on insulin, hypertension, obstructive sleep apnea, TBI, and asthma/COPD who presents with confusion. VSS. CBC shows no leukocytosis but does show a hemoglobin of 10.7 which is above his baseline.  CMP shows a slightly elevated bilirubin at 2.4.  Ammonia level is 96.  UA shows moderate blood, positive nitrite, small leuk esterase trace bacteria.  CT of the head shows global/frontal lobe atrophy.  Patient was given 2 g of ceftriaxone and started on lactulose q4 hours.  Gradual improvement in mentation.  Ammonia normalized.  No ascites on ultrasound.  Completed Rocephin.  Had CODE BLUE called after getting MRI of the brain where he became cyanotic with agonal breathing but never lost a pulse.  Had multiple seizures and had to be intubated for airway protection.  On Keppra for seizures.  Patient was started on lactulose.  Was on propofol/fentanyl drip for sedation due to seizure activities.  Patient self extubated on .  Saturating in room air thereafter.  Was also on Levophed for pressor support.  Eventually patient was weaned off Levophed and blood pressure has been stable.  Sedation was discontinued.  Also had multiple bowel movements after which she was more awake and alert.  He was transferred out of critical care unit on .  Mentation improving.  Patient still on restraints to prevent him from pulling out the core track.  Speech evaluated the patient and started on diet. His mentation was improving but again started deterioration. Pulled his cortrak and rectal tube twice.  Was placed on restraints . Also received as  needed haldol for agitation.  Speech evaluated the patient and cleared for diet, but NG tube was placed again given patient was refusing medication including lactulose.  Patient's ammonia level have been stable.  He was agitated and combative for which he was on restraints.  Given his ammonia levels were normal and him still being confused, agitated and combative, ABG was obtained which was nonsignificant.  CT head without contrast showed no acute findings.  CT abdomen pelvis without contrast also showed no evidence of ascites.  His mentation improved significantly for which neurology was not consulted.  Mentation currently at baseline.    Interval Followup:   Patient is sitting in bed comfortably; calm and cooperative.  Answering all questions appropriately.  Very pleasant today.  Alert, awake and oriented x 3.  Denies any fever, chills, rigors, chest pain,, difficulty breathing or abdominal pain.    Review of Systems    All systems reviewed and negative except for what is outlined above.      Objective   Objective     Vitals:   Temp:  [97.5 °F (36.4 °C)-98.1 °F (36.7 °C)] 97.7 °F (36.5 °C)  Heart Rate:  [68-79] 68  Resp:  [16-18] 18  BP: (138-164)/(67-76) 140/76    Physical Exam   General: Awake and alert; NAD.  Cardiovascular: RRR, no murmurs   Pulmonary: CTA bilaterally; no wheezes; no conversational dyspnea  Gastrointestinal: S/ND/NT, +BS  Neuro: Awake, alert, oriented x 3.  Off restraints.  Very cooperative and pleasant today.    Result Review    Result Review:  I have personally reviewed these results:  [x]  Laboratory      Lab 05/27/24 0615 05/26/24  0628 05/25/24  0349   WBC 3.69 3.67 5.18   HEMOGLOBIN 11.0* 10.0* 10.3*   HEMATOCRIT 35.4* 32.4* 33.0*   PLATELETS 67* 61* 82*   NEUTROS ABS 2.12 2.29 3.40   IMMATURE GRANS (ABS) 0.01 0.01 0.01   LYMPHS ABS 1.04 0.81 1.01   MONOS ABS 0.27 0.33 0.52   EOS ABS 0.20 0.20 0.21   MCV 92.2 92.3 91.2         Lab 05/27/24  0615 05/26/24  0628 05/25/24  0349   SODIUM  142 145 145   POTASSIUM 3.8 4.4 3.8   CHLORIDE 112* 117* 117*   CO2 18.5* 17.8* 17.6*   ANION GAP 11.5 10.2 10.4   BUN 21* 23* 23*   CREATININE 0.88 0.79 0.77   EGFR 99.7 103.0 103.8   GLUCOSE 102* 120* 133*   CALCIUM 8.6 8.6 8.8   MAGNESIUM 1.8 2.1 2.1   PHOSPHORUS 3.6 3.7 2.9         Lab 05/23/24  0425 05/22/24  0419 05/21/24  0518   TOTAL PROTEIN 5.9* 5.5* 5.7*   ALBUMIN 2.9* 2.8* 2.8*   GLOBULIN 3.0 2.7 2.9   ALT (SGPT) 17 20 18   AST (SGOT) 30 35 36   BILIRUBIN 1.1 1.1 1.3*   ALK PHOS 70 75 67                 Lab 05/25/24  0906 05/22/24  0419 05/21/24  0518   IRON  --   --  35*   IRON SATURATION (TSAT)  --   --  11*   TIBC  --   --  326   TRANSFERRIN  --   --  219   FERRITIN  --  67.40  --    FOLATE 10.20  --   --    VITAMIN B 12 1,048*  --   --          Lab 05/24/24  1422   PH, ARTERIAL 7.361   PCO2, ARTERIAL 37.3   PO2 ART 73.3*   O2 SATURATION ART 92.3*   FIO2 21   HCO3 ART 20.6*   BASE EXCESS ART -4.3*   CARBOXYHEMOGLOBIN 0.3     Brief Urine Lab Results  (Last result in the past 365 days)        Color   Clarity   Blood   Leuk Est   Nitrite   Protein   CREAT   Urine HCG        05/18/24 2110 Yellow   Clear   Negative   Negative   Negative   Negative                 [x]  Microbiology   Microbiology Results (last 10 days)       Procedure Component Value - Date/Time    Blood Culture - Blood, Arm, Right [[id number]]  (Normal) Collected: 05/20/24 1220    Lab Status: Final result Specimen: Blood from Arm, Right Updated: 05/26/24 0701     Blood Culture No growth at 5 days    Blood Culture - Blood, Hand, Left [[id number]]  (Normal) Collected: 05/20/24 1206    Lab Status: Final result Specimen: Blood from Hand, Left Updated: 05/25/24 1246     Blood Culture No growth at 5 days    Respiratory Culture - Sputum, ET Suction [[id number]] Collected: 05/18/24 2126    Lab Status: Final result Specimen: Sputum from ET Suction Updated: 05/20/24 1056     Respiratory Culture Scant growth (1+) Normal respiratory marion. No S. aureus or  Pseudomonas aeruginosa detected. Final report.     Gram Stain Moderate (3+) WBCs seen      Moderate (3+) Epithelial cells seen      Few (2+) Gram positive cocci in pairs, chains and clusters    MRSA Screen, PCR (Inpatient) - Swab, Nares [[id number]]  (Normal) Collected: 05/18/24 2112    Lab Status: Final result Specimen: Swab from Nares Updated: 05/18/24 2235     MRSA PCR No MRSA Detected    Narrative:      The negative predictive value of this diagnostic test is high and should only be used to consider de-escalating anti-MRSA therapy. A positive result may indicate colonization with MRSA and must be correlated clinically.    Respiratory Panel PCR w/COVID-19(SARS-CoV-2) BRENDAN/LE/CATRACHITA/[facility] In-House, NP Swab in UTM/VTM, 2 HR TAT - Swab, Nasopharynx [[id number]]  (Normal) Collected: 05/18/24 2112    Lab Status: Final result Specimen: Swab from Nasopharynx Updated: 05/19/24 0655     ADENOVIRUS, PCR Not Detected     Coronavirus 229E Not Detected     Coronavirus HKU1 Not Detected     Coronavirus NL63 Not Detected     Coronavirus OC43 Not Detected     COVID19 Not Detected     Human Metapneumovirus Not Detected     Human Rhinovirus/Enterovirus Not Detected     Influenza A PCR Not Detected     Influenza B PCR Not Detected     Parainfluenza Virus 1 Not Detected     Parainfluenza Virus 2 Not Detected     Parainfluenza Virus 3 Not Detected     Parainfluenza Virus 4 Not Detected     RSV, PCR Not Detected     Bordetella pertussis pcr Not Detected     Bordetella parapertussis PCR Not Detected     Chlamydophila pneumoniae PCR Not Detected     Mycoplasma pneumo by PCR Not Detected    Narrative:      In the setting of a positive respiratory panel with a viral infection PLUS a negative procalcitonin without other underlying concern for bacterial infection, consider observing off antibiotics or discontinuation of antibiotics and continue supportive care. If the respiratory panel is positive for atypical bacterial infection  (Bordetella pertussis, Chlamydophila pneumoniae, or Mycoplasma pneumoniae), consider antibiotic de-escalation to target atypical bacterial infection.    S. Pneumo Ag Urine or CSF - Urine, Urine, Clean Catch [[id number]]  (Normal) Collected: 05/18/24 2110    Lab Status: Final result Specimen: Urine, Clean Catch Updated: 05/18/24 2151     Strep Pneumo Ag Negative    Legionella Antigen, Urine - Urine, Urine, Clean Catch [[id number]]  (Normal) Collected: 05/18/24 2110    Lab Status: Final result Specimen: Urine, Clean Catch Updated: 05/18/24 2151     LEGIONELLA ANTIGEN, URINE Negative    Blood Culture - Blood, Arm, Left [[id number]]  (Abnormal) Collected: 05/18/24 2046    Lab Status: Edited Result - FINAL Specimen: Blood from Arm, Left Updated: 05/22/24 0706     Blood Culture Staphylococcus, coagulase negative     Isolated from Aerobic and Anaerobic Bottles     Gram Stain Aerobic Bottle Gram positive cocci in clusters      Anaerobic Bottle Gram positive cocci in clusters     Comment: Appended report. These results have been appended to a previously final verified report.       Narrative:      Probable contaminant requires clinical correlation, susceptibility not performed unless requested by physician.      Blood Culture ID, PCR - Blood, Arm, Left [[id number]]  (Abnormal) Collected: 05/18/24 2046    Lab Status: Final result Specimen: Blood from Arm, Left Updated: 05/19/24 2001     BCID, PCR Staph spp, not aureus or lugdunensis. Identification by BCID2 PCR.     BOTTLE TYPE Aerobic Bottle    Blood Culture - Blood, Arm, Left [[id number]]  (Normal) Collected: 05/18/24 2025    Lab Status: Final result Specimen: Blood from Arm, Left Updated: 05/23/24 2045     Blood Culture No growth at 5 days          [x]  Radiology  CT Abdomen Pelvis Without Contrast    Result Date: 5/25/2024   1. Cirrhosis and splenomegaly. No evidence ascites at this time. 2. No clearly acute findings in the GI tract. The appendix is normal. 3. Air within the  urinary bladder lumen could be due to recent instrumentation. Correlate with history. The bladder is otherwise normal. No hydronephrosis. 4. Soft tissue density in the right inguinal canal is probably the right testicle. Correlate with physical exam. 5. Additional nonacute findings as above.      Electronically Signed By-Dr. Kye Breaux MD On:5/25/2024 9:39 PM      CT Head Without Contrast    Result Date: 5/25/2024  Chronic senescent changes. No acute findings.    Electronically Signed By-Dr. Kye Breaux MD On:5/25/2024 7:14 PM      XR Chest 1 View    Result Date: 5/20/2024  Lungs are slightly better aerated than on prior exam, suggesting improving pneumonia or pulmonary edema.   Electronically Signed By-Theo Castaneda MD On:5/20/2024 3:14 PM      XR Abdomen KUB    Result Date: 5/19/2024  Impression: No evidence of stool impaction or obstruction   Electronically Signed By-Darrick Gomez On:5/19/2024 6:19 PM     []  EKG/Telemetry   []  Cardiology/Vascular   []  Pathology  []  Old records  []  Other:    Assessment & Plan   Assessment / Plan     Assessment:  Hepatic encephalopathy  Seizure  Hyperammonemia  S/p mechanical ventilation for airway protection, self extubated on 5/20  Decompensated ROMO cirrhosis  Staph bacteremia, likely contamination  Chronic thrombocytopenia  Chronic anemia  Frontal lobe atrophy  History of GI bleed secondary to peptic ulcer  History of GERD without esophagitis  Essential hypertension  Insulin-dependent type 2 diabetes  Anxiety and depression  Morbid obesity BMI 43    Plan:  Patient currently being managed in medicine service.  Self extubated on 5/20, saturating well on room air.  Patient was confused but awake, alert and oriented x 3 today.    Ammonia 50.  On lactulose to 30 g every 6 hours.   Ammonia level 50 today.  Continue.  Patient was confused this hospitalization even with normal ammonia for which CT head was obtained which only showed chronic senescent changes.  CT  abdomen showed cirrhosis and splenomegaly with no evidence of ascites.  Continue rifaximin.  Blood culture 5/18 grew gram-positive cocci, staph species not aureus or lugdunensis in 1 set of blood culture;.  Blood culture sent on 5/20, showing no growth at 5 days.  Likely contaminant.  Currently saturating well on room air.  On IV Keppra 500 mg every 6 hours, Will transition him to p.o. Keppra 500 mg every 12 hours from today.  EEG on 5/20 showed no epileptiform discharges.  Neurology and pulmonology following the patient, appreciate input.  Off restraints.  On as needed IV haldol for agitation.  Hemoglobin 11.0, iron panel sent showed showed iron deficiency.  S/p 3 doses of IV iron.  On p.o. iron 325 mg daily.    Speech/swallow evaluated the patient, recommended mechanical soft diet, thin liquid.  Diet ordered.  Tolerating well.  Currently has bedhold at [place], patient LTC resident there.  Clinical course to determine disposition.  Likely discharge in next 24 hours if clinically stable.      DVT prophylaxis:  Medical and mechanical DVT prophylaxis orders are present.        CODE STATUS:   Medical Intervention Limits: NO intubation (DNI)  Level Of Support Discussed With: Next of Kin (If No Surrogate)  Code Status (Patient has no pulse and is not breathing): No CPR (Do Not Attempt to Resuscitate)  Medical Interventions (Patient has pulse or is breathing): Limited Support      Electronically signed by Rinku Gao MD, 05/27/24, 10:49 AM EDT.

## 2024-05-27 NOTE — PLAN OF CARE
Goal Outcome Evaluation:  Patient alert and oriented except to time.  Patient able to make needs known.  Patient ambulated to bathroom and with assist x1 had a shower, tolerated well.  Patient VSS. Patient with c/o pain x2 mainly of the right breast area and at times to the left breast area, tender/sore to the touch, thus far this shift, prn pain medication administered as directed.  Patient with no acute events at this time. Continue plan of care.

## 2024-05-28 LAB
ANION GAP SERPL CALCULATED.3IONS-SCNC: 9.7 MMOL/L (ref 5–15)
BASOPHILS # BLD AUTO: 0.02 10*3/MM3 (ref 0–0.2)
BASOPHILS NFR BLD AUTO: 0.8 % (ref 0–1.5)
BUN SERPL-MCNC: 17 MG/DL (ref 6–20)
BUN/CREAT SERPL: 20.5 (ref 7–25)
CALCIUM SPEC-SCNC: 8.4 MG/DL (ref 8.6–10.5)
CHLORIDE SERPL-SCNC: 112 MMOL/L (ref 98–107)
CO2 SERPL-SCNC: 18.3 MMOL/L (ref 22–29)
CREAT SERPL-MCNC: 0.83 MG/DL (ref 0.76–1.27)
DEPRECATED RDW RBC AUTO: 64.2 FL (ref 37–54)
EGFRCR SERPLBLD CKD-EPI 2021: 101.4 ML/MIN/1.73
EOSINOPHIL # BLD AUTO: 0.14 10*3/MM3 (ref 0–0.4)
EOSINOPHIL NFR BLD AUTO: 5.5 % (ref 0.3–6.2)
ERYTHROCYTE [DISTWIDTH] IN BLOOD BY AUTOMATED COUNT: 18.7 % (ref 12.3–15.4)
GLUCOSE BLDC GLUCOMTR-MCNC: 121 MG/DL (ref 70–99)
GLUCOSE BLDC GLUCOMTR-MCNC: 124 MG/DL (ref 70–99)
GLUCOSE BLDC GLUCOMTR-MCNC: 150 MG/DL (ref 70–99)
GLUCOSE BLDC GLUCOMTR-MCNC: 155 MG/DL (ref 70–99)
GLUCOSE SERPL-MCNC: 139 MG/DL (ref 65–99)
HCT VFR BLD AUTO: 31.2 % (ref 37.5–51)
HGB BLD-MCNC: 9.7 G/DL (ref 13–17.7)
IMM GRANULOCYTES # BLD AUTO: 0.01 10*3/MM3 (ref 0–0.05)
IMM GRANULOCYTES NFR BLD AUTO: 0.4 % (ref 0–0.5)
LYMPHOCYTES # BLD AUTO: 0.61 10*3/MM3 (ref 0.7–3.1)
LYMPHOCYTES NFR BLD AUTO: 23.9 % (ref 19.6–45.3)
MAGNESIUM SERPL-MCNC: 1.5 MG/DL (ref 1.6–2.6)
MCH RBC QN AUTO: 28.9 PG (ref 26.6–33)
MCHC RBC AUTO-ENTMCNC: 31.1 G/DL (ref 31.5–35.7)
MCV RBC AUTO: 92.9 FL (ref 79–97)
MONOCYTES # BLD AUTO: 0.29 10*3/MM3 (ref 0.1–0.9)
MONOCYTES NFR BLD AUTO: 11.4 % (ref 5–12)
NEUTROPHILS NFR BLD AUTO: 1.48 10*3/MM3 (ref 1.7–7)
NEUTROPHILS NFR BLD AUTO: 58 % (ref 42.7–76)
NRBC BLD AUTO-RTO: 0 /100 WBC (ref 0–0.2)
PHOSPHATE SERPL-MCNC: 3.8 MG/DL (ref 2.5–4.5)
PLATELET # BLD AUTO: 59 10*3/MM3 (ref 140–450)
PMV BLD AUTO: 11.8 FL (ref 6–12)
POTASSIUM SERPL-SCNC: 3.8 MMOL/L (ref 3.5–5.2)
RBC # BLD AUTO: 3.36 10*6/MM3 (ref 4.14–5.8)
SODIUM SERPL-SCNC: 140 MMOL/L (ref 136–145)
WBC NRBC COR # BLD AUTO: 2.55 10*3/MM3 (ref 3.4–10.8)

## 2024-05-28 PROCEDURE — 83735 ASSAY OF MAGNESIUM: CPT | Performed by: STUDENT IN AN ORGANIZED HEALTH CARE EDUCATION/TRAINING PROGRAM

## 2024-05-28 PROCEDURE — 25010000002 LEVETIRACETAM IN NACL 0.82% 500 MG/100ML SOLUTION: Performed by: PSYCHIATRY & NEUROLOGY

## 2024-05-28 PROCEDURE — 25010000002 ENOXAPARIN PER 10 MG: Performed by: STUDENT IN AN ORGANIZED HEALTH CARE EDUCATION/TRAINING PROGRAM

## 2024-05-28 PROCEDURE — 99232 SBSQ HOSP IP/OBS MODERATE 35: CPT | Performed by: INTERNAL MEDICINE

## 2024-05-28 PROCEDURE — 85025 COMPLETE CBC W/AUTO DIFF WBC: CPT | Performed by: STUDENT IN AN ORGANIZED HEALTH CARE EDUCATION/TRAINING PROGRAM

## 2024-05-28 PROCEDURE — 94799 UNLISTED PULMONARY SVC/PX: CPT

## 2024-05-28 PROCEDURE — 63710000001 INSULIN LISPRO (HUMAN) PER 5 UNITS: Performed by: INTERNAL MEDICINE

## 2024-05-28 PROCEDURE — 80048 BASIC METABOLIC PNL TOTAL CA: CPT | Performed by: STUDENT IN AN ORGANIZED HEALTH CARE EDUCATION/TRAINING PROGRAM

## 2024-05-28 PROCEDURE — 84100 ASSAY OF PHOSPHORUS: CPT | Performed by: STUDENT IN AN ORGANIZED HEALTH CARE EDUCATION/TRAINING PROGRAM

## 2024-05-28 PROCEDURE — 82948 REAGENT STRIP/BLOOD GLUCOSE: CPT

## 2024-05-28 RX ADMIN — LACTULOSE 30 G: 20 SOLUTION ORAL at 05:31

## 2024-05-28 RX ADMIN — SUCRALFATE 1 G: 1 TABLET ORAL at 09:47

## 2024-05-28 RX ADMIN — SUCRALFATE 1 G: 1 TABLET ORAL at 20:56

## 2024-05-28 RX ADMIN — INSULIN LISPRO 2 UNITS: 100 INJECTION, SOLUTION INTRAVENOUS; SUBCUTANEOUS at 20:56

## 2024-05-28 RX ADMIN — SENNOSIDES AND DOCUSATE SODIUM 2 TABLET: 50; 8.6 TABLET ORAL at 20:56

## 2024-05-28 RX ADMIN — HYDROCORTISONE 1 APPLICATION: 1 OINTMENT TOPICAL at 22:41

## 2024-05-28 RX ADMIN — CARVEDILOL 6.25 MG: 6.25 TABLET, FILM COATED ORAL at 09:48

## 2024-05-28 RX ADMIN — ENOXAPARIN SODIUM 40 MG: 100 INJECTION SUBCUTANEOUS at 22:41

## 2024-05-28 RX ADMIN — PANTOPRAZOLE SODIUM 40 MG: 40 INJECTION, POWDER, FOR SOLUTION INTRAVENOUS at 05:31

## 2024-05-28 RX ADMIN — BUSPIRONE HYDROCHLORIDE 7.5 MG: 15 TABLET ORAL at 16:36

## 2024-05-28 RX ADMIN — SUCRALFATE 1 G: 1 TABLET ORAL at 16:36

## 2024-05-28 RX ADMIN — ENOXAPARIN SODIUM 40 MG: 100 INJECTION SUBCUTANEOUS at 11:31

## 2024-05-28 RX ADMIN — LACTULOSE 30 G: 20 SOLUTION ORAL at 18:07

## 2024-05-28 RX ADMIN — CARVEDILOL 6.25 MG: 6.25 TABLET, FILM COATED ORAL at 18:07

## 2024-05-28 RX ADMIN — OXYCODONE 10 MG: 5 TABLET ORAL at 03:27

## 2024-05-28 RX ADMIN — OXYCODONE 10 MG: 5 TABLET ORAL at 16:36

## 2024-05-28 RX ADMIN — LACTULOSE 30 G: 20 SOLUTION ORAL at 11:29

## 2024-05-28 RX ADMIN — RIFAXIMIN 550 MG: 550 TABLET ORAL at 20:56

## 2024-05-28 RX ADMIN — RIFAXIMIN 550 MG: 550 TABLET ORAL at 11:29

## 2024-05-28 RX ADMIN — SUCRALFATE 1 G: 1 TABLET ORAL at 11:29

## 2024-05-28 RX ADMIN — FUROSEMIDE 40 MG: 40 TABLET ORAL at 09:48

## 2024-05-28 RX ADMIN — HYDROCORTISONE 1 APPLICATION: 1 OINTMENT TOPICAL at 16:38

## 2024-05-28 RX ADMIN — SERTRALINE HYDROCHLORIDE 100 MG: 100 TABLET, FILM COATED ORAL at 20:56

## 2024-05-28 RX ADMIN — HYDROCORTISONE 1 APPLICATION: 1 OINTMENT TOPICAL at 09:50

## 2024-05-28 RX ADMIN — LEVETIRACETAM 500 MG: 5 INJECTION, SOLUTION INTRAVENOUS at 05:31

## 2024-05-28 RX ADMIN — OXYCODONE 10 MG: 5 TABLET ORAL at 22:41

## 2024-05-28 RX ADMIN — LEVETIRACETAM 500 MG: 5 INJECTION, SOLUTION INTRAVENOUS at 18:07

## 2024-05-28 RX ADMIN — BUSPIRONE HYDROCHLORIDE 7.5 MG: 15 TABLET ORAL at 20:56

## 2024-05-28 RX ADMIN — Medication 5 MG: at 20:56

## 2024-05-28 RX ADMIN — ROPINIROLE HYDROCHLORIDE 1 MG: 1 TABLET, FILM COATED ORAL at 20:56

## 2024-05-28 RX ADMIN — INSULIN LISPRO 2 UNITS: 100 INJECTION, SOLUTION INTRAVENOUS; SUBCUTANEOUS at 18:07

## 2024-05-28 RX ADMIN — Medication 10 ML: at 20:57

## 2024-05-28 RX ADMIN — Medication 1000 UNITS: at 09:48

## 2024-05-28 RX ADMIN — BUSPIRONE HYDROCHLORIDE 7.5 MG: 15 TABLET ORAL at 09:48

## 2024-05-28 RX ADMIN — OXYCODONE 10 MG: 5 TABLET ORAL at 09:47

## 2024-05-28 RX ADMIN — FERROUS SULFATE TAB 325 MG (65 MG ELEMENTAL FE) 325 MG: 325 (65 FE) TAB at 09:50

## 2024-05-28 RX ADMIN — LEVETIRACETAM 500 MG: 5 INJECTION, SOLUTION INTRAVENOUS at 11:29

## 2024-05-28 NOTE — PLAN OF CARE
Goal Outcome Evaluation:      VSS. Given oxycodone twice this shift. To discharge tomorrow.     Progress: no change

## 2024-05-28 NOTE — PROGRESS NOTES
[facility]   Hospitalist Progress Note  Date: 2024  Patient Name: Nic Nicolas  : 1965  MRN: [id number]  Date of admission: 2024  Room/Bed: [facility]      Subjective   Subjective     Chief Complaint:   Follow up for confusion     Summary:  Nic Nicolas is a 59 y.o. male with medical history of cirrhosis of the liver due to ROMO, hepatic encephalopathy, DVT in the past, diabetes on insulin, hypertension, obstructive sleep apnea, TBI, and asthma/COPD who presents with confusion. VSS. CBC shows no leukocytosis but does show a hemoglobin of 10.7 which is above his baseline.  CMP shows a slightly elevated bilirubin at 2.4.  Ammonia level is 96.  UA shows moderate blood, positive nitrite, small leuk esterase trace bacteria.  CT of the head shows global/frontal lobe atrophy.  Patient was given 2 g of ceftriaxone and started on lactulose q4 hours.  Gradual improvement in mentation.  Ammonia normalized.  No ascites on ultrasound.  Completed Rocephin.  Had CODE BLUE called after getting MRI of the brain where he became cyanotic with agonal breathing but never lost a pulse.  Had multiple seizures and had to be intubated for airway protection.  On Keppra for seizures.  Patient was started on lactulose.  Was on propofol/fentanyl drip for sedation due to seizure activities.  Patient self extubated on .  Saturating in room air thereafter.  Was also on Levophed for pressor support.  Eventually patient was weaned off Levophed and blood pressure has been stable.  Sedation was discontinued.  Also had multiple bowel movements after which she was more awake and alert.  He was transferred out of critical care unit on .  Mentation improving.  Patient still on restraints to prevent him from pulling out the core track.  Speech evaluated the patient and started on diet. His mentation was improving but again started deterioration. Pulled his cortrak and rectal tube twice.  Was placed on restraints . Also  received as needed haldol for agitation.  Speech evaluated the patient and cleared for diet, but NG tube was placed again given patient was refusing medication including lactulose.  Patient's ammonia level have been stable.  He was agitated and combative for which he was on restraints.  Given his ammonia levels were normal and him still being confused, agitated and combative, ABG was obtained which was nonsignificant.  CT head without contrast showed no acute findings.  CT abdomen pelvis without contrast also showed no evidence of ascites.  His mentation improved significantly for which neurology was not consulted.  Mentation currently at baseline.    Interval Followup:   Patient resting comfortably in bed.  Patient's mental status appears at baseline.  Answering questions appropriately.  Patient is a long-term resident at [facility].  For continued appropriate mentation will discuss further with case management about discharging    Objective   Objective     Vitals:   Temp:  [97.3 °F (36.3 °C)-98.1 °F (36.7 °C)] 97.9 °F (36.6 °C)  Heart Rate:  [68-72] 68  Resp:  [14-18] 16  BP: (105-144)/(65-74) 121/65    Physical Exam   General: Awake and alert; NAD.  Cardiovascular: RRR, no murmurs   Pulmonary: CTA bilaterally; no wheezes; no conversational dyspnea  Gastrointestinal: S/ND/NT, +BS  Neuro: Awake, alert, oriented x 3.  Answering questions appropriately, following commands    Result Review    Result Review:  I have personally reviewed these results:  [x]  Laboratory      Lab 05/28/24  0552 05/27/24  0615 05/26/24  0628   WBC 2.55* 3.69 3.67   HEMOGLOBIN 9.7* 11.0* 10.0*   HEMATOCRIT 31.2* 35.4* 32.4*   PLATELETS 59* 67* 61*   NEUTROS ABS 1.48* 2.12 2.29   IMMATURE GRANS (ABS) 0.01 0.01 0.01   LYMPHS ABS 0.61* 1.04 0.81   MONOS ABS 0.29 0.27 0.33   EOS ABS 0.14 0.20 0.20   MCV 92.9 92.2 92.3         Lab 05/28/24  0552 05/27/24  0615 05/26/24  0628   SODIUM 140 142 145   POTASSIUM 3.8 3.8 4.4   CHLORIDE 112* 112*  117*   CO2 18.3* 18.5* 17.8*   ANION GAP 9.7 11.5 10.2   BUN 17 21* 23*   CREATININE 0.83 0.88 0.79   EGFR 101.4 99.7 103.0   GLUCOSE 139* 102* 120*   CALCIUM 8.4* 8.6 8.6   MAGNESIUM 1.5* 1.8 2.1   PHOSPHORUS 3.8 3.6 3.7         Lab 05/23/24  0425 05/22/24  0419   TOTAL PROTEIN 5.9* 5.5*   ALBUMIN 2.9* 2.8*   GLOBULIN 3.0 2.7   ALT (SGPT) 17 20   AST (SGOT) 30 35   BILIRUBIN 1.1 1.1   ALK PHOS 70 75                 Lab 05/25/24  0906 05/22/24  0419   FERRITIN  --  67.40   FOLATE 10.20  --    VITAMIN B 12 1,048*  --          Lab 05/24/24  1422   PH, ARTERIAL 7.361   PCO2, ARTERIAL 37.3   PO2 ART 73.3*   O2 SATURATION ART 92.3*   FIO2 21   HCO3 ART 20.6*   BASE EXCESS ART -4.3*   CARBOXYHEMOGLOBIN 0.3     Brief Urine Lab Results  (Last result in the past 365 days)        Color   Clarity   Blood   Leuk Est   Nitrite   Protein   CREAT   Urine HCG        05/18/24 2110 Yellow   Clear   Negative   Negative   Negative   Negative                 [x]  Microbiology   Microbiology Results (last 10 days)       Procedure Component Value - Date/Time    Blood Culture - Blood, Arm, Right [[id number]]  (Normal) Collected: 05/20/24 1220    Lab Status: Final result Specimen: Blood from Arm, Right Updated: 05/26/24 0701     Blood Culture No growth at 5 days    Blood Culture - Blood, Hand, Left [[id number]]  (Normal) Collected: 05/20/24 1206    Lab Status: Final result Specimen: Blood from Hand, Left Updated: 05/25/24 1246     Blood Culture No growth at 5 days    Respiratory Culture - Sputum, ET Suction [[id number]] Collected: 05/18/24 2126    Lab Status: Final result Specimen: Sputum from ET Suction Updated: 05/20/24 1056     Respiratory Culture Scant growth (1+) Normal respiratory marion. No S. aureus or Pseudomonas aeruginosa detected. Final report.     Gram Stain Moderate (3+) WBCs seen      Moderate (3+) Epithelial cells seen      Few (2+) Gram positive cocci in pairs, chains and clusters    MRSA Screen, PCR (Inpatient) - Swab, Nares  [[id number]]  (Normal) Collected: 05/18/24 2112    Lab Status: Final result Specimen: Swab from Nares Updated: 05/18/24 2235     MRSA PCR No MRSA Detected    Narrative:      The negative predictive value of this diagnostic test is high and should only be used to consider de-escalating anti-MRSA therapy. A positive result may indicate colonization with MRSA and must be correlated clinically.    Respiratory Panel PCR w/COVID-19(SARS-CoV-2) [facility] In-House, NP Swab in UTM/VTM, 2 HR TAT - Swab, Nasopharynx [[id number]]  (Normal) Collected: 05/18/24 2112    Lab Status: Final result Specimen: Swab from Nasopharynx Updated: 05/19/24 0655     ADENOVIRUS, PCR Not Detected     Coronavirus 229E Not Detected     Coronavirus HKU1 Not Detected     Coronavirus NL63 Not Detected     Coronavirus OC43 Not Detected     COVID19 Not Detected     Human Metapneumovirus Not Detected     Human Rhinovirus/Enterovirus Not Detected     Influenza A PCR Not Detected     Influenza B PCR Not Detected     Parainfluenza Virus 1 Not Detected     Parainfluenza Virus 2 Not Detected     Parainfluenza Virus 3 Not Detected     Parainfluenza Virus 4 Not Detected     RSV, PCR Not Detected     Bordetella pertussis pcr Not Detected     Bordetella parapertussis PCR Not Detected     Chlamydophila pneumoniae PCR Not Detected     Mycoplasma pneumo by PCR Not Detected    Narrative:      In the setting of a positive respiratory panel with a viral infection PLUS a negative procalcitonin without other underlying concern for bacterial infection, consider observing off antibiotics or discontinuation of antibiotics and continue supportive care. If the respiratory panel is positive for atypical bacterial infection (Bordetella pertussis, Chlamydophila pneumoniae, or Mycoplasma pneumoniae), consider antibiotic de-escalation to target atypical bacterial infection.    S. Pneumo Ag Urine or CSF - Urine, Urine, Clean Catch [[id number]]  (Normal) Collected:  05/18/24 2110    Lab Status: Final result Specimen: Urine, Clean Catch Updated: 05/18/24 2151     Strep Pneumo Ag Negative    Legionella Antigen, Urine - Urine, Urine, Clean Catch [[id number]]  (Normal) Collected: 05/18/24 2110    Lab Status: Final result Specimen: Urine, Clean Catch Updated: 05/18/24 2151     LEGIONELLA ANTIGEN, URINE Negative    Blood Culture - Blood, Arm, Left [[id number]]  (Abnormal) Collected: 05/18/24 2046    Lab Status: Edited Result - FINAL Specimen: Blood from Arm, Left Updated: 05/22/24 0706     Blood Culture Staphylococcus, coagulase negative     Isolated from Aerobic and Anaerobic Bottles     Gram Stain Aerobic Bottle Gram positive cocci in clusters      Anaerobic Bottle Gram positive cocci in clusters     Comment: Appended report. These results have been appended to a previously final verified report.       Narrative:      Probable contaminant requires clinical correlation, susceptibility not performed unless requested by physician.      Blood Culture ID, PCR - Blood, Arm, Left [[id number]]  (Abnormal) Collected: 05/18/24 2046    Lab Status: Final result Specimen: Blood from Arm, Left Updated: 05/19/24 2001     BCID, PCR Staph spp, not aureus or lugdunensis. Identification by BCID2 PCR.     BOTTLE TYPE Aerobic Bottle    Blood Culture - Blood, Arm, Left [[id number]]  (Normal) Collected: 05/18/24 2025    Lab Status: Final result Specimen: Blood from Arm, Left Updated: 05/23/24 2045     Blood Culture No growth at 5 days          [x]  Radiology  CT Chest Without Contrast Diagnostic    Result Date: 5/27/2024   1. The lungs are clear. 2. Marked bilateral gynecomastia. Soft tissue changes in the subcutaneous right upper breast may reflect contusion. Correlate with physical exam. The body wall is otherwise unremarkable.  Electronically Signed By-Dr. Kye Breaux MD On:5/27/2024 9:07 PM      CT Abdomen Pelvis Without Contrast    Result Date: 5/25/2024   1. Cirrhosis and splenomegaly. No evidence  ascites at this time. 2. No clearly acute findings in the GI tract. The appendix is normal. 3. Air within the urinary bladder lumen could be due to recent instrumentation. Correlate with history. The bladder is otherwise normal. No hydronephrosis. 4. Soft tissue density in the right inguinal canal is probably the right testicle. Correlate with physical exam. 5. Additional nonacute findings as above.      Electronically Signed By-Dr. Kye Breaux MD On:5/25/2024 9:39 PM      CT Head Without Contrast    Result Date: 5/25/2024  Chronic senescent changes. No acute findings.    Electronically Signed By-Dr. Kye Breaux MD On:5/25/2024 7:14 PM     []  EKG/Telemetry   []  Cardiology/Vascular   []  Pathology  []  Old records  []  Other:    Assessment & Plan   Assessment / Plan     Assessment:  Hepatic encephalopathy  Seizure  Hyperammonemia  S/p mechanical ventilation for airway protection, self extubated on 5/20  Decompensated ROMO cirrhosis  Staph bacteremia, likely contamination  Chronic thrombocytopenia  Chronic anemia  Frontal lobe atrophy  History of GI bleed secondary to peptic ulcer  History of GERD without esophagitis  Essential hypertension  Insulin-dependent type 2 diabetes  Anxiety and depression  Morbid obesity BMI 43    Plan:  Patient remains admitted to the hospital for further care and management  Self extubated on 5/20, saturating well on room air.  Patient's mentation alert and oriented x 3 today, answering questions appropriately  On lactulose to 30 g every 6 hours.   Patient was confused this hospitalization even with normal ammonia for which CT head was obtained which only showed chronic senescent changes.  CT abdomen showed cirrhosis and splenomegaly with no evidence of ascites.  Continue rifaximin.  Blood culture 5/18 grew gram-positive cocci, staph species not aureus or lugdunensis in 1 set of blood culture;.  Blood culture sent on 5/20, showing no growth at 5 days.  Likely  contaminant.  Continues on Keppra per neurology.  Will discuss with neurologist ongoing need for Keppra  Neurology and pulmonology following the patient, appreciate input.  Speech/swallow evaluated the patient, recommended mechanical soft diet, thin liquid.  Diet ordered.  Tolerating well.  Currently has bedhold at [facility], patient LTC resident there.  Clinical course to determine disposition.        DVT prophylaxis:  Medical and mechanical DVT prophylaxis orders are present.        CODE STATUS:   Medical Intervention Limits: NO intubation (DNI)  Level Of Support Discussed With: Next of Kin (If No Surrogate)  Code Status (Patient has no pulse and is not breathing): No CPR (Do Not Attempt to Resuscitate)  Medical Interventions (Patient has pulse or is breathing): Limited Support

## 2024-05-29 VITALS
SYSTOLIC BLOOD PRESSURE: 134 MMHG | WEIGHT: 266.32 LBS | HEIGHT: 68 IN | HEART RATE: 72 BPM | RESPIRATION RATE: 16 BRPM | DIASTOLIC BLOOD PRESSURE: 64 MMHG | OXYGEN SATURATION: 98 % | TEMPERATURE: 97.9 F | BODY MASS INDEX: 40.36 KG/M2

## 2024-05-29 LAB
ALBUMIN SERPL-MCNC: 2.7 G/DL (ref 3.5–5.2)
ALBUMIN/GLOB SERPL: 1.1 G/DL
ALP SERPL-CCNC: 64 U/L (ref 39–117)
ALT SERPL W P-5'-P-CCNC: 23 U/L (ref 1–41)
ANION GAP SERPL CALCULATED.3IONS-SCNC: 8.7 MMOL/L (ref 5–15)
AST SERPL-CCNC: 49 U/L (ref 1–40)
BILIRUB SERPL-MCNC: 1 MG/DL (ref 0–1.2)
BUN SERPL-MCNC: 13 MG/DL (ref 6–20)
BUN/CREAT SERPL: 16.9 (ref 7–25)
CALCIUM SPEC-SCNC: 8 MG/DL (ref 8.6–10.5)
CHLORIDE SERPL-SCNC: 110 MMOL/L (ref 98–107)
CO2 SERPL-SCNC: 20.3 MMOL/L (ref 22–29)
CREAT SERPL-MCNC: 0.77 MG/DL (ref 0.76–1.27)
DEPRECATED RDW RBC AUTO: 61.2 FL (ref 37–54)
EGFRCR SERPLBLD CKD-EPI 2021: 103.8 ML/MIN/1.73
ERYTHROCYTE [DISTWIDTH] IN BLOOD BY AUTOMATED COUNT: 18.4 % (ref 12.3–15.4)
GLOBULIN UR ELPH-MCNC: 2.4 GM/DL
GLUCOSE BLDC GLUCOMTR-MCNC: 118 MG/DL (ref 70–99)
GLUCOSE BLDC GLUCOMTR-MCNC: 150 MG/DL (ref 70–99)
GLUCOSE SERPL-MCNC: 130 MG/DL (ref 65–99)
HCT VFR BLD AUTO: 30.1 % (ref 37.5–51)
HGB BLD-MCNC: 9.4 G/DL (ref 13–17.7)
MAGNESIUM SERPL-MCNC: 1.4 MG/DL (ref 1.6–2.6)
MCH RBC QN AUTO: 28.6 PG (ref 26.6–33)
MCHC RBC AUTO-ENTMCNC: 31.2 G/DL (ref 31.5–35.7)
MCV RBC AUTO: 91.5 FL (ref 79–97)
PHOSPHATE SERPL-MCNC: 3 MG/DL (ref 2.5–4.5)
PLATELET # BLD AUTO: 58 10*3/MM3 (ref 140–450)
PMV BLD AUTO: 11.3 FL (ref 6–12)
POTASSIUM SERPL-SCNC: 3.5 MMOL/L (ref 3.5–5.2)
PROT SERPL-MCNC: 5.1 G/DL (ref 6–8.5)
RBC # BLD AUTO: 3.29 10*6/MM3 (ref 4.14–5.8)
SODIUM SERPL-SCNC: 139 MMOL/L (ref 136–145)
WBC NRBC COR # BLD AUTO: 2.46 10*3/MM3 (ref 3.4–10.8)

## 2024-05-29 PROCEDURE — 82948 REAGENT STRIP/BLOOD GLUCOSE: CPT | Performed by: INTERNAL MEDICINE

## 2024-05-29 PROCEDURE — 83735 ASSAY OF MAGNESIUM: CPT | Performed by: INTERNAL MEDICINE

## 2024-05-29 PROCEDURE — 80053 COMPREHEN METABOLIC PANEL: CPT | Performed by: INTERNAL MEDICINE

## 2024-05-29 PROCEDURE — 99239 HOSP IP/OBS DSCHRG MGMT >30: CPT | Performed by: INTERNAL MEDICINE

## 2024-05-29 PROCEDURE — 84100 ASSAY OF PHOSPHORUS: CPT | Performed by: INTERNAL MEDICINE

## 2024-05-29 PROCEDURE — 85027 COMPLETE CBC AUTOMATED: CPT | Performed by: INTERNAL MEDICINE

## 2024-05-29 PROCEDURE — 94799 UNLISTED PULMONARY SVC/PX: CPT

## 2024-05-29 PROCEDURE — 25010000002 MAGNESIUM SULFATE 2 GM/50ML SOLUTION: Performed by: INTERNAL MEDICINE

## 2024-05-29 PROCEDURE — 25010000002 ENOXAPARIN PER 10 MG: Performed by: STUDENT IN AN ORGANIZED HEALTH CARE EDUCATION/TRAINING PROGRAM

## 2024-05-29 PROCEDURE — 63710000001 INSULIN LISPRO (HUMAN) PER 5 UNITS: Performed by: INTERNAL MEDICINE

## 2024-05-29 PROCEDURE — 82948 REAGENT STRIP/BLOOD GLUCOSE: CPT

## 2024-05-29 PROCEDURE — 25010000002 LEVETIRACETAM IN NACL 0.82% 500 MG/100ML SOLUTION: Performed by: PSYCHIATRY & NEUROLOGY

## 2024-05-29 RX ORDER — LEVETIRACETAM 500 MG/1
500 TABLET ORAL 2 TIMES DAILY
Qty: 60 TABLET | Refills: 0 | Status: SHIPPED | OUTPATIENT
Start: 2024-05-29 | End: 2024-06-28

## 2024-05-29 RX ORDER — MAGNESIUM SULFATE HEPTAHYDRATE 40 MG/ML
2 INJECTION, SOLUTION INTRAVENOUS ONCE
Status: COMPLETED | OUTPATIENT
Start: 2024-05-29 | End: 2024-05-29

## 2024-05-29 RX ORDER — POTASSIUM CHLORIDE 750 MG/1
40 CAPSULE, EXTENDED RELEASE ORAL ONCE
Status: COMPLETED | OUTPATIENT
Start: 2024-05-29 | End: 2024-05-29

## 2024-05-29 RX ORDER — LACTULOSE 10 G/15ML
30 SOLUTION ORAL 3 TIMES DAILY
Start: 2024-05-29

## 2024-05-29 RX ORDER — NYSTATIN 100000 U/G
1 OINTMENT TOPICAL EVERY 12 HOURS SCHEDULED
Status: DISCONTINUED | OUTPATIENT
Start: 2024-05-29 | End: 2024-05-29 | Stop reason: HOSPADM

## 2024-05-29 RX ADMIN — POTASSIUM CHLORIDE 40 MEQ: 750 CAPSULE, EXTENDED RELEASE ORAL at 09:17

## 2024-05-29 RX ADMIN — NYSTATIN OINTMENT 1 APPLICATION: 100000 OINTMENT TOPICAL at 10:22

## 2024-05-29 RX ADMIN — CARVEDILOL 6.25 MG: 6.25 TABLET, FILM COATED ORAL at 09:17

## 2024-05-29 RX ADMIN — SUCRALFATE 1 G: 1 TABLET ORAL at 06:36

## 2024-05-29 RX ADMIN — PANTOPRAZOLE SODIUM 40 MG: 40 INJECTION, POWDER, FOR SOLUTION INTRAVENOUS at 05:06

## 2024-05-29 RX ADMIN — LEVETIRACETAM 500 MG: 5 INJECTION, SOLUTION INTRAVENOUS at 00:47

## 2024-05-29 RX ADMIN — ENOXAPARIN SODIUM 40 MG: 100 INJECTION SUBCUTANEOUS at 12:24

## 2024-05-29 RX ADMIN — HYDROCORTISONE 1 APPLICATION: 1 OINTMENT TOPICAL at 09:21

## 2024-05-29 RX ADMIN — OXYCODONE 10 MG: 5 TABLET ORAL at 10:51

## 2024-05-29 RX ADMIN — Medication 10 ML: at 09:20

## 2024-05-29 RX ADMIN — FUROSEMIDE 40 MG: 40 TABLET ORAL at 09:17

## 2024-05-29 RX ADMIN — MAGNESIUM SULFATE HEPTAHYDRATE 2 G: 40 INJECTION, SOLUTION INTRAVENOUS at 09:17

## 2024-05-29 RX ADMIN — Medication 1000 UNITS: at 09:17

## 2024-05-29 RX ADMIN — INSULIN LISPRO 2 UNITS: 100 INJECTION, SOLUTION INTRAVENOUS; SUBCUTANEOUS at 12:24

## 2024-05-29 RX ADMIN — RIFAXIMIN 550 MG: 550 TABLET ORAL at 09:17

## 2024-05-29 RX ADMIN — SUCRALFATE 1 G: 1 TABLET ORAL at 12:24

## 2024-05-29 RX ADMIN — SENNOSIDES AND DOCUSATE SODIUM 2 TABLET: 50; 8.6 TABLET ORAL at 09:17

## 2024-05-29 RX ADMIN — OXYCODONE 10 MG: 5 TABLET ORAL at 04:48

## 2024-05-29 RX ADMIN — FERROUS SULFATE TAB 325 MG (65 MG ELEMENTAL FE) 325 MG: 325 (65 FE) TAB at 09:17

## 2024-05-29 RX ADMIN — LEVETIRACETAM 500 MG: 5 INJECTION, SOLUTION INTRAVENOUS at 05:28

## 2024-05-29 RX ADMIN — BUSPIRONE HYDROCHLORIDE 7.5 MG: 15 TABLET ORAL at 09:17

## 2024-05-29 NOTE — PLAN OF CARE
Goal Outcome Evaluation:  Plan of Care Reviewed With: patient         Pt is alert and orient x4.  Vs WNL for pt.  Insulin adm as ordered.  PRN pain medications adm with fair effect.

## 2024-05-29 NOTE — DISCHARGE SUMMARY
[facility]         HOSPITALIST  DISCHARGE SUMMARY    Patient Name: Nic Nicolas  : 1965  MRN: [id number]    Date of Admission: 2024  Date of Discharge:  2024  Primary Care Physician: Sheldon Carcamo MD    Consults:  Neurology  Pulmonary critical care    Active and Resolved Hospital Problems:  Hepatic encephalopathy  Seizure  S/p mechanical ventilation for airway protection, self extubated on   Decompensated ROMO cirrhosis  Staph bacteremia, contamination  Chronic thrombocytopenia  Chronic anemia  Frontal lobe atrophy  History of GI bleed secondary to peptic ulcer  History of GERD without esophagitis  Essential hypertension  Insulin-dependent type 2 diabetes  Anxiety and depression  Morbid obesity BMI 43    Hospital Course     Hospital Course:  Nic Nicolas is a 59 y.o. male with medical history of cirrhosis of the liver due to ROMO, hepatic encephalopathy, DVT in the past, diabetes on insulin, hypertension, obstructive sleep apnea, TBI, and asthma/COPD who presents with confusion. VSS. CBC shows no leukocytosis but does show a hemoglobin of 10.7 which is above his baseline.  CMP shows a slightly elevated bilirubin at 2.4.  Ammonia level is 96.  UA shows moderate blood, positive nitrite, small leuk esterase trace bacteria.  CT of the head shows global/frontal lobe atrophy.  Patient was given 2 g of ceftriaxone and started on lactulose q4 hours.  Gradual improvement in mentation.  Ammonia normalized.  No ascites on ultrasound.  Completed Rocephin.  Had CODE BLUE called after getting MRI of the brain where he became cyanotic with agonal breathing but never lost a pulse.  Had multiple seizures and had to be intubated for airway protection.  Started on Keppra for seizures.  Patient self extubated on .  Saturating in room air thereafter.  He was transferred out of critical care unit on .  Mentation improving.  Patient still on restraints to prevent him from  pulling out the core track.  Speech evaluated the patient and started on diet. His mentation was improving but again started deterioration. Pulled his cortrak and rectal tube twice.  Was placed on restraints . Also received as needed haldol for agitation.  Speech evaluated the patient and cleared for diet, but NG tube was placed again given patient was refusing medication including lactulose.  Patient's ammonia level have been stable.  He was agitated and combative for which he was on restraints.  Given his ammonia levels were normal and him still being confused, agitated and combative, ABG was obtained which was nonsignificant.  CT head without contrast showed no acute findings.  CT abdomen pelvis without contrast also showed no evidence of ascites.  His mentation improved significantly for which neurology was not consulted.  Patient's mentation returned to baseline and stayed at baseline for 2 days prior to discharging.  Patient long-term resident at [facility] will be returning back to his facility.  Patient started on new Keppra and will need to follow-up with neurology as an outpatient.  Increased dosing of patient's home lactulose.  Patient also noted to have rash to his right armpit, started on nystatin ointment in the hospital, will be continued at SNF.  Patient seen on date of discharge, clinically and hemodynamically stable.  Patient provided concerning signs and symptoms prompting immediate medical attention, patient understanding and agreeable    DISCHARGE Follow Up Recommendations for labs and diagnostics:   Follow-up with primary care physician soon as possible  Follow-up with neurology in clinic      Day of Discharge     Vital Signs:  Temp:  [97.7 °F (36.5 °C)-98.3 °F (36.8 °C)] 98.3 °F (36.8 °C)  Heart Rate:  [65-75] 75  Resp:  [16-18] 18  BP: (111-135)/(47-78) 121/64  Physical Exam:   General: Awake and alert; NAD.  Cardiovascular: RRR, no murmurs   Pulmonary: CTA bilaterally; no wheezes; no  conversational dyspnea  Gastrointestinal: S/ND/NT, +BS  Neuro: Awake, alert, oriented x 3.  Answering questions appropriately, following commands.  Ambulating in the room with assistance of walker    Discharge Details        Discharge Medications        New Medications        Instructions Start Date   levETIRAcetam 500 MG tablet  Commonly known as: Keppra   500 mg, Oral, 2 Times Daily             Changes to Medications        Instructions Start Date   lactulose 10 GM/15ML solution  Commonly known as: CHRONULAC  What changed: See the new instructions.   30 g, Oral, 3 Times Daily      Levemir FlexPen 100 UNIT/ML injection  Generic drug: insulin detemir  What changed:   how much to take  when to take this   10 Units, Subcutaneous, Daily      naloxone 4 MG/0.1ML nasal spray  Commonly known as: NARCAN  What changed:   how much to take  how to take this  when to take this  reasons to take this   CALL 911. Do not prime. Spray into nostril upon signs of opioid overdose. May repeat in 2 to 3 minutes in opposite nostril if no or minimal breathing and responsiveness, then as needed (if doses are available) every 2 to 3 minutes.      riFAXIMin 550 MG tablet  Commonly known as: XIFAXAN  What changed: when to take this   550 mg, Oral, Every 12 Hours Scheduled             Continue These Medications        Instructions Start Date   acetaminophen 325 MG chewable tablet  Commonly known as: TYLENOL   650 mg, Oral, Every 6 Hours PRN      albuterol sulfate  (90 Base) MCG/ACT inhaler  Commonly known as: PROVENTIL HFA;VENTOLIN HFA;PROAIR HFA   2 puffs, Inhalation, Every 4 Hours PRN      busPIRone 7.5 MG tablet  Commonly known as: BUSPAR   7.5 mg, Oral, 3 Times Daily      carvedilol 6.25 MG tablet  Commonly known as: COREG   6.25 mg, Oral, 2 Times Daily With Meals, Hold for systolic BP less than 100 mmHg or heart rate less than 55 bpm.      cetirizine 10 MG tablet  Commonly known as: zyrTEC   10 mg, Oral, Daily      cholecalciferol  25 MCG (1000 UT) tablet  Commonly known as: VITAMIN D3   1,000 Units, Oral, Daily      Diclofenac Sodium 1 % gel gel  Commonly known as: VOLTAREN   4 g, Topical, 4 Times Daily      ferrous gluconate 324 MG tablet  Commonly known as: FERGON   Take 1 tablet by mouth Daily With Breakfast.      Flovent  MCG/ACT inhaler  Generic drug: fluticasone   1 puff, Inhalation, 2 Times Daily - RT      furosemide 40 MG tablet  Commonly known as: LASIX   TAKE 1 TABLET BY MOUTH 2 (TWO) TIMES A DAY.      HumaLOG KwikPen 100 UNIT/ML solution pen-injector  Generic drug: Insulin Lispro (1 Unit Dial)   15 Units, Subcutaneous, 3 Times Daily Before Meals      ipratropium-albuterol 0.5-2.5 mg/3 ml nebulizer  Commonly known as: DUO-NEB   3 mL, Nebulization, Every 4 Hours PRN      magnesium oxide 400 MG tablet  Commonly known as: MAG-OX   400 mg, Oral, Daily      nystatin [id number] UNIT/GM powder  Commonly known as: MYCOSTATIN   1 Application, Topical, 2 Times Daily      oxyCODONE 10 MG tablet  Commonly known as: ROXICODONE   10 mg, Oral, Every 6 Hours PRN      pantoprazole 40 MG EC tablet  Commonly known as: PROTONIX   40 mg, Oral, Daily      polyethyl glycol-propyl glycol 0.4-0.3 % solution ophthalmic solution (artificial tears)  Commonly known as: SYSTANE   2 drops, Both Eyes, Every 1 Hour PRN      rOPINIRole 1 MG tablet  Commonly known as: REQUIP   1 mg, Oral, Nightly, take 1 hour before bedtime      sertraline 100 MG tablet  Commonly known as: ZOLOFT   100 mg, Oral, Nightly      simethicone 80 MG chewable tablet  Commonly known as: MYLICON   80 mg, Oral, 3 Times Daily Before Meals      spironolactone 100 MG tablet  Commonly known as: Aldactone   100 mg, Oral, 2 Times Daily      sucralfate 1 g tablet  Commonly known as: CARAFATE   1 g, Oral, 4 Times Daily               No Known Allergies    Discharge Disposition:  Skilled Nursing Facility (DC - External)    Diet:  Hospital:  Diet Order   Procedures    Diet: Cardiac, Diabetic; Healthy  Heart (2-3 Na+); Consistent Carbohydrate; Texture: Mechanical Ground (NDD 2); Fluid Consistency: Thin (IDDSI 0)       Discharge Activity:   Activity Instructions       Activity as Tolerated              CODE STATUS:  Code Status and Medical Interventions:   Ordered at: 05/20/24 1324     Medical Intervention Limits:    NO intubation (DNI)     Level Of Support Discussed With:    Next of Kin (If No Surrogate)     Code Status (Patient has no pulse and is not breathing):    No CPR (Do Not Attempt to Resuscitate)     Medical Interventions (Patient has pulse or is breathing):    Limited Support         Future Appointments   Date Time Provider Department Center   8/6/2024  2:00 PM Karlos Roblero MD [facility] GE ETW [facility]   9/12/2024  2:15 PM Vahid Leggett MD [facility] ENT ETWN [facility]       Additional Instructions for the Follow-ups that You Need to Schedule       Discharge Follow-up with PCP   As directed       Currently Documented PCP:    Sheldon Carcamo MD    PCP Phone Number:    [phone]     Follow Up Details: In less than one week        Discharge Follow-up with Specified Provider: Dr. Issa, neurology; 2 Weeks   As directed      To: Dr. Issa, neurology   Follow Up: 2 Weeks                Pertinent  and/or Most Recent Results     PROCEDURES:   none    LAB RESULTS:      Lab 05/29/24  0640 05/28/24  0552 05/27/24  0615 05/26/24  0628 05/25/24  0349 05/24/24  0401   WBC 2.46* 2.55* 3.69 3.67 5.18 4.04   HEMOGLOBIN 9.4* 9.7* 11.0* 10.0* 10.3* 9.5*   HEMATOCRIT 30.1* 31.2* 35.4* 32.4* 33.0* 30.2*   PLATELETS 58* 59* 67* 61* 82* 73*   NEUTROS ABS  --  1.48* 2.12 2.29 3.40 2.64   IMMATURE GRANS (ABS)  --  0.01 0.01 0.01 0.01 0.01   LYMPHS ABS  --  0.61* 1.04 0.81 1.01 0.76   MONOS ABS  --  0.29 0.27 0.33 0.52 0.45   EOS ABS  --  0.14 0.20 0.20 0.21 0.16   MCV 91.5 92.9 92.2 92.3 91.2 90.4         Lab 05/29/24  0640 05/28/24  0552 05/27/24  0615 05/26/24  0628 05/25/24  0349   SODIUM 139 140 142 145 145   POTASSIUM  3.5 3.8 3.8 4.4 3.8   CHLORIDE 110* 112* 112* 117* 117*   CO2 20.3* 18.3* 18.5* 17.8* 17.6*   ANION GAP 8.7 9.7 11.5 10.2 10.4   BUN 13 17 21* 23* 23*   CREATININE 0.77 0.83 0.88 0.79 0.77   EGFR 103.8 101.4 99.7 103.0 103.8   GLUCOSE 130* 139* 102* 120* 133*   CALCIUM 8.0* 8.4* 8.6 8.6 8.8   MAGNESIUM 1.4* 1.5* 1.8 2.1 2.1   PHOSPHORUS 3.0 3.8 3.6 3.7 2.9         Lab 05/29/24  0640 05/23/24  0425   TOTAL PROTEIN 5.1* 5.9*   ALBUMIN 2.7* 2.9*   GLOBULIN 2.4 3.0   ALT (SGPT) 23 17   AST (SGOT) 49* 30   BILIRUBIN 1.0 1.1   ALK PHOS 64 70                 Lab 05/25/24  0906   FOLATE 10.20   VITAMIN B 12 1,048*         Lab 05/24/24  1422   PH, ARTERIAL 7.361   PCO2, ARTERIAL 37.3   PO2 ART 73.3*   O2 SATURATION ART 92.3*   FIO2 21   HCO3 ART 20.6*   BASE EXCESS ART -4.3*   CARBOXYHEMOGLOBIN 0.3     Brief Urine Lab Results  (Last result in the past 365 days)        Color   Clarity   Blood   Leuk Est   Nitrite   Protein   CREAT   Urine HCG        05/18/24 2110 Yellow   Clear   Negative   Negative   Negative   Negative                 Microbiology Results (last 10 days)       Procedure Component Value - Date/Time    Blood Culture - Blood, Arm, Right [[id number]]  (Normal) Collected: 05/20/24 1220    Lab Status: Final result Specimen: Blood from Arm, Right Updated: 05/26/24 0701     Blood Culture No growth at 5 days    Blood Culture - Blood, Hand, Left [[id number]]  (Normal) Collected: 05/20/24 1206    Lab Status: Final result Specimen: Blood from Hand, Left Updated: 05/25/24 1246     Blood Culture No growth at 5 days            CT Chest Without Contrast Diagnostic    Result Date: 5/27/2024  Impression:  1. The lungs are clear. 2. Marked bilateral gynecomastia. Soft tissue changes in the subcutaneous right upper breast may reflect contusion. Correlate with physical exam. The body wall is otherwise unremarkable.  Electronically Signed By-Dr. Kye Breaux MD On:5/27/2024 9:07 PM      CT Abdomen Pelvis Without  Contrast    Result Date: 5/25/2024  Impression:  1. Cirrhosis and splenomegaly. No evidence ascites at this time. 2. No clearly acute findings in the GI tract. The appendix is normal. 3. Air within the urinary bladder lumen could be due to recent instrumentation. Correlate with history. The bladder is otherwise normal. No hydronephrosis. 4. Soft tissue density in the right inguinal canal is probably the right testicle. Correlate with physical exam. 5. Additional nonacute findings as above.      Electronically Signed By-Dr. Kye Breaux MD On:5/25/2024 9:39 PM      CT Head Without Contrast    Result Date: 5/25/2024  Impression: Chronic senescent changes. No acute findings.    Electronically Signed By-Dr. Kye Breaux MD On:5/25/2024 7:14 PM      XR Chest 1 View    Result Date: 5/20/2024  Impression: Lungs are slightly better aerated than on prior exam, suggesting improving pneumonia or pulmonary edema.   Electronically Signed By-Theo Castaneda MD On:5/20/2024 3:14 PM      XR Abdomen KUB    Result Date: 5/19/2024  Impression: Impression: No evidence of stool impaction or obstruction   Electronically Signed By-Darrick Gomez On:5/19/2024 6:19 PM      XR Chest 1 View    Result Date: 5/18/2024  Impression: The endotracheal (ET) tube, nasogastric (NG) tube, and right IJ central venous line are thought to be in satisfactory position. Bilateral airspace opacities are present and may represent infectious multifocal pneumonia. No pneumothorax is seen. Please see above comments for further detail.    Please note that portions of this note were completed with a voice recognition program.     Electronically Signed By-Herberth Arevalo MD On:5/18/2024 8:34 PM      MRI Brain Without Contrast    Result Date: 5/18/2024  Impression: Impression:  1. No acute process 2. Parenchymal atrophy and chronic small vessel ischemic white matter changes    Electronically Signed BySocorro Gomez On:5/18/2024 5:55 PM      US Abdomen  Limited    Result Date: 5/16/2024  Impression: Impression: Inadequate ascites for safe paracentesis at this time.  Electronically Signed By-FABI DIAZ MD On:5/16/2024 11:58 AM      CT Head Without Contrast    Result Date: 5/13/2024  Impression: 1.  Evidence for some frontal lobe atrophy.  Electronically Signed By-Tarun Grant MD On:5/13/2024 4:59 PM      XR Chest 1 View    Result Date: 5/13/2024  Impression: No acute process.  Electronically Signed By-Gunnar Pedro MD On:5/13/2024 2:58 PM       Results for orders placed during the hospital encounter of 02/24/22    Duplex Carotid Ultrasound CAR    Interpretation Summary  · No significant stenosis is present in carotid bifurcations bilaterally.  · There are right mid internal carotid artery velocity elevations that would meet criteria for mild to moderate stenosis, however, this appears to be related to tortuosity in this region.  · No significant plaque in the bifurcations bilaterally.  · Vertebral flow is antegrade bilaterally.      Results for orders placed during the hospital encounter of 02/24/22    Duplex Carotid Ultrasound CAR    Interpretation Summary  · No significant stenosis is present in carotid bifurcations bilaterally.  · There are right mid internal carotid artery velocity elevations that would meet criteria for mild to moderate stenosis, however, this appears to be related to tortuosity in this region.  · No significant plaque in the bifurcations bilaterally.  · Vertebral flow is antegrade bilaterally.      Results for orders placed during the hospital encounter of 05/13/24    Adult Transthoracic Echo Complete W/ Cont if Necessary Per Protocol    Interpretation Summary    Left ventricular ejection fraction appears to be 66 - 70%.    Left ventricular wall thickness is consistent with mild concentric hypertrophy.    Left ventricular diastolic function is consistent with (grade I) impaired relaxation.    The left atrial cavity is mildly dilated.     Estimated right ventricular systolic pressure from tricuspid regurgitation is normal (<35 mmHg).      Labs Pending at Discharge:        Time spent on Discharge including face to face service:  40 minutes    Electronically signed by Odell Soliz MD, 05/29/24, 9:33 AM EDT.

## 2024-06-09 ENCOUNTER — HOSPITAL ENCOUNTER (EMERGENCY)
Facility: HOSPITAL | Age: 59
Discharge: HOME | End: 2024-06-09
Attending: EMERGENCY MEDICINE
Payer: MEDICAID

## 2024-06-09 VITALS
RESPIRATION RATE: 18 BRPM | DIASTOLIC BLOOD PRESSURE: 84 MMHG | HEART RATE: 70 BPM | OXYGEN SATURATION: 94 % | HEIGHT: 68 IN | WEIGHT: 278.66 LBS | SYSTOLIC BLOOD PRESSURE: 110 MMHG | TEMPERATURE: 98.9 F | BODY MASS INDEX: 42.23 KG/M2

## 2024-06-09 DIAGNOSIS — Z13.9 ENCOUNTER FOR MEDICAL SCREENING EXAMINATION: Primary | ICD-10-CM

## 2024-06-09 DIAGNOSIS — E87.6 HYPOKALEMIA: ICD-10-CM

## 2024-06-09 LAB
ALBUMIN SERPL-MCNC: 3.1 G/DL (ref 3.5–5.2)
ALBUMIN/GLOB SERPL: 1.2 G/DL
ALP SERPL-CCNC: 87 U/L (ref 39–117)
ALT SERPL W P-5'-P-CCNC: 21 U/L (ref 1–41)
AMMONIA BLD-SCNC: 41 UMOL/L (ref 16–60)
AMPHET+METHAMPHET UR QL: NEGATIVE
ANION GAP SERPL CALCULATED.3IONS-SCNC: 11.3 MMOL/L (ref 5–15)
AST SERPL-CCNC: 44 U/L (ref 1–40)
BARBITURATES UR QL SCN: NEGATIVE
BASOPHILS # BLD AUTO: 0.02 10*3/MM3 (ref 0–0.2)
BASOPHILS NFR BLD AUTO: 0.5 % (ref 0–1.5)
BENZODIAZ UR QL SCN: NEGATIVE
BILIRUB SERPL-MCNC: 2.1 MG/DL (ref 0–1.2)
BUN SERPL-MCNC: 14 MG/DL (ref 6–20)
BUN/CREAT SERPL: 17.1 (ref 7–25)
CALCIUM SPEC-SCNC: 8.6 MG/DL (ref 8.6–10.5)
CANNABINOIDS SERPL QL: NEGATIVE
CHLORIDE SERPL-SCNC: 101 MMOL/L (ref 98–107)
CO2 SERPL-SCNC: 26.7 MMOL/L (ref 22–29)
COCAINE UR QL: NEGATIVE
CREAT SERPL-MCNC: 0.82 MG/DL (ref 0.76–1.27)
DEPRECATED RDW RBC AUTO: 58.6 FL (ref 37–54)
EGFRCR SERPLBLD CKD-EPI 2021: 101.8 ML/MIN/1.73
EOSINOPHIL # BLD AUTO: 0.09 10*3/MM3 (ref 0–0.4)
EOSINOPHIL NFR BLD AUTO: 2.3 % (ref 0.3–6.2)
ERYTHROCYTE [DISTWIDTH] IN BLOOD BY AUTOMATED COUNT: 18.1 % (ref 12.3–15.4)
FENTANYL UR-MCNC: NEGATIVE NG/ML
GLOBULIN UR ELPH-MCNC: 2.6 GM/DL
GLUCOSE SERPL-MCNC: 216 MG/DL (ref 65–99)
HCT VFR BLD AUTO: 31.1 % (ref 37.5–51)
HGB BLD-MCNC: 10.3 G/DL (ref 13–17.7)
HOLD SPECIMEN: NORMAL
HOLD SPECIMEN: NORMAL
IMM GRANULOCYTES # BLD AUTO: 0.01 10*3/MM3 (ref 0–0.05)
IMM GRANULOCYTES NFR BLD AUTO: 0.3 % (ref 0–0.5)
LYMPHOCYTES # BLD AUTO: 0.53 10*3/MM3 (ref 0.7–3.1)
LYMPHOCYTES NFR BLD AUTO: 13.8 % (ref 19.6–45.3)
MCH RBC QN AUTO: 29.5 PG (ref 26.6–33)
MCHC RBC AUTO-ENTMCNC: 33.1 G/DL (ref 31.5–35.7)
MCV RBC AUTO: 89.1 FL (ref 79–97)
METHADONE UR QL SCN: NEGATIVE
MONOCYTES # BLD AUTO: 0.29 10*3/MM3 (ref 0.1–0.9)
MONOCYTES NFR BLD AUTO: 7.6 % (ref 5–12)
NEUTROPHILS NFR BLD AUTO: 2.89 10*3/MM3 (ref 1.7–7)
NEUTROPHILS NFR BLD AUTO: 75.5 % (ref 42.7–76)
NRBC BLD AUTO-RTO: 0 /100 WBC (ref 0–0.2)
OPIATES UR QL: NEGATIVE
OXYCODONE UR QL SCN: POSITIVE
PLATELET # BLD AUTO: 67 10*3/MM3 (ref 140–450)
PMV BLD AUTO: 10.2 FL (ref 6–12)
POTASSIUM SERPL-SCNC: 3.2 MMOL/L (ref 3.5–5.2)
PROT SERPL-MCNC: 5.7 G/DL (ref 6–8.5)
RBC # BLD AUTO: 3.49 10*6/MM3 (ref 4.14–5.8)
SODIUM SERPL-SCNC: 139 MMOL/L (ref 136–145)
WBC NRBC COR # BLD AUTO: 3.83 10*3/MM3 (ref 3.4–10.8)
WHOLE BLOOD HOLD COAG: NORMAL
WHOLE BLOOD HOLD SPECIMEN: NORMAL

## 2024-06-09 PROCEDURE — 80307 DRUG TEST PRSMV CHEM ANLYZR: CPT | Performed by: EMERGENCY MEDICINE

## 2024-06-09 PROCEDURE — 80053 COMPREHEN METABOLIC PANEL: CPT | Performed by: EMERGENCY MEDICINE

## 2024-06-09 PROCEDURE — 99283 EMERGENCY DEPT VISIT LOW MDM: CPT

## 2024-06-09 PROCEDURE — 96372 THER/PROPH/DIAG INJ SC/IM: CPT

## 2024-06-09 PROCEDURE — 25010000002 HYDROMORPHONE 1 MG/ML SOLUTION: Performed by: EMERGENCY MEDICINE

## 2024-06-09 PROCEDURE — 82140 ASSAY OF AMMONIA: CPT | Performed by: EMERGENCY MEDICINE

## 2024-06-09 PROCEDURE — 85025 COMPLETE CBC W/AUTO DIFF WBC: CPT | Performed by: EMERGENCY MEDICINE

## 2024-06-09 RX ORDER — SODIUM CHLORIDE 0.9 % (FLUSH) 0.9 %
10 SYRINGE (ML) INJECTION AS NEEDED
Status: DISCONTINUED | OUTPATIENT
Start: 2024-06-09 | End: 2024-06-09 | Stop reason: HOSPADM

## 2024-06-09 RX ORDER — POTASSIUM CHLORIDE 750 MG/1
40 CAPSULE, EXTENDED RELEASE ORAL ONCE
Status: COMPLETED | OUTPATIENT
Start: 2024-06-09 | End: 2024-06-09

## 2024-06-09 RX ADMIN — HYDROMORPHONE HYDROCHLORIDE 1 MG: 1 INJECTION, SOLUTION INTRAMUSCULAR; INTRAVENOUS; SUBCUTANEOUS at 14:08

## 2024-06-09 RX ADMIN — POTASSIUM CHLORIDE 40 MEQ: 750 CAPSULE, EXTENDED RELEASE ORAL at 14:06
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
## 2024-06-09 NOTE — DISCHARGE INSTRUCTIONS
Resume prior medical management.  Resume prior medications.  Follow-up with your primary care provider as needed.  Return to the ER for any new symptoms or any other complaints that may arise.

## 2024-06-14 LAB
QT INTERVAL: 381 MS
QTC INTERVAL: 455 MS

## 2024-07-08 ENCOUNTER — APPOINTMENT (OUTPATIENT)
Dept: GENERAL RADIOLOGY | Facility: HOSPITAL | Age: 59
End: 2024-07-08
Payer: MEDICAID

## 2024-07-08 ENCOUNTER — HOSPITAL ENCOUNTER (INPATIENT)
Facility: HOSPITAL | Age: 59
LOS: 2 days | Discharge: NURSING, REHAB OR LONG-TERM CARE | End: 2024-07-10
Attending: EMERGENCY MEDICINE | Admitting: INTERNAL MEDICINE
Payer: MEDICAID

## 2024-07-08 ENCOUNTER — APPOINTMENT (OUTPATIENT)
Dept: CT IMAGING | Facility: HOSPITAL | Age: 59
End: 2024-07-08
Payer: MEDICAID

## 2024-07-08 DIAGNOSIS — M51.36 LUMBAR DEGENERATIVE DISC DISEASE: ICD-10-CM

## 2024-07-08 DIAGNOSIS — K74.60 CIRRHOSIS OF LIVER WITH ASCITES, UNSPECIFIED HEPATIC CIRRHOSIS TYPE: ICD-10-CM

## 2024-07-08 DIAGNOSIS — Z78.9 DECREASED ACTIVITIES OF DAILY LIVING (ADL): ICD-10-CM

## 2024-07-08 DIAGNOSIS — R41.82 ALTERED MENTAL STATUS, UNSPECIFIED ALTERED MENTAL STATUS TYPE: Primary | ICD-10-CM

## 2024-07-08 DIAGNOSIS — R13.10 DYSPHAGIA, UNSPECIFIED TYPE: ICD-10-CM

## 2024-07-08 DIAGNOSIS — R26.2 DIFFICULTY IN WALKING: ICD-10-CM

## 2024-07-08 DIAGNOSIS — R18.8 CIRRHOSIS OF LIVER WITH ASCITES, UNSPECIFIED HEPATIC CIRRHOSIS TYPE: ICD-10-CM

## 2024-07-08 LAB
ALBUMIN SERPL-MCNC: 3.1 G/DL (ref 3.5–5.2)
ALBUMIN/GLOB SERPL: 1.1 G/DL
ALP SERPL-CCNC: 109 U/L (ref 39–117)
ALT SERPL W P-5'-P-CCNC: 20 U/L (ref 1–41)
AMMONIA BLD-SCNC: 83 UMOL/L (ref 16–60)
AMPHET+METHAMPHET UR QL: NEGATIVE
ANION GAP SERPL CALCULATED.3IONS-SCNC: 8.7 MMOL/L (ref 5–15)
APAP SERPL-MCNC: <5 MCG/ML (ref 0–30)
ARTERIAL PATENCY WRIST A: POSITIVE
AST SERPL-CCNC: 46 U/L (ref 1–40)
ATMOSPHERIC PRESS: 742.5 MMHG
BARBITURATES UR QL SCN: NEGATIVE
BASE EXCESS BLDA CALC-SCNC: 4 MMOL/L (ref -2–2)
BASOPHILS # BLD AUTO: 0.04 10*3/MM3 (ref 0–0.2)
BASOPHILS NFR BLD AUTO: 0.4 % (ref 0–1.5)
BDY SITE: ABNORMAL
BENZODIAZ UR QL SCN: NEGATIVE
BILIRUB SERPL-MCNC: 1.7 MG/DL (ref 0–1.2)
BILIRUB UR QL STRIP: NEGATIVE
BUN SERPL-MCNC: 19 MG/DL (ref 6–20)
BUN/CREAT SERPL: 20.9 (ref 7–25)
CALCIUM SPEC-SCNC: 8.5 MG/DL (ref 8.6–10.5)
CANNABINOIDS SERPL QL: NEGATIVE
CHLORIDE SERPL-SCNC: 105 MMOL/L (ref 98–107)
CK SERPL-CCNC: 77 U/L (ref 20–200)
CLARITY UR: CLEAR
CO2 SERPL-SCNC: 27.3 MMOL/L (ref 22–29)
COCAINE UR QL: NEGATIVE
COLOR UR: YELLOW
CREAT SERPL-MCNC: 0.91 MG/DL (ref 0.76–1.27)
DEPRECATED RDW RBC AUTO: 50.7 FL (ref 37–54)
EGFRCR SERPLBLD CKD-EPI 2021: 97.7 ML/MIN/1.73
EOSINOPHIL # BLD AUTO: 0.07 10*3/MM3 (ref 0–0.4)
EOSINOPHIL NFR BLD AUTO: 0.7 % (ref 0.3–6.2)
ERYTHROCYTE [DISTWIDTH] IN BLOOD BY AUTOMATED COUNT: 15.7 % (ref 12.3–15.4)
ETHANOL BLD-MCNC: <10 MG/DL (ref 0–10)
ETHANOL UR QL: <0.01 %
FENTANYL UR-MCNC: NEGATIVE NG/ML
FLUAV SUBTYP SPEC NAA+PROBE: NOT DETECTED
FLUBV RNA ISLT QL NAA+PROBE: NOT DETECTED
GLOBULIN UR ELPH-MCNC: 2.8 GM/DL
GLUCOSE BLDC GLUCOMTR-MCNC: 149 MG/DL (ref 70–99)
GLUCOSE SERPL-MCNC: 125 MG/DL (ref 65–99)
GLUCOSE UR STRIP-MCNC: NEGATIVE MG/DL
HCO3 BLDA-SCNC: 26 MMOL/L (ref 22–26)
HCT VFR BLD AUTO: 30 % (ref 37.5–51)
HCT VFR BLD CALC: 29 % (ref 38–51)
HEMODILUTION: NO
HGB BLD-MCNC: 9.8 G/DL (ref 13–17.7)
HGB BLDA-MCNC: 10 G/DL (ref 12–18)
HGB UR QL STRIP.AUTO: NEGATIVE
IMM GRANULOCYTES # BLD AUTO: 0.04 10*3/MM3 (ref 0–0.05)
IMM GRANULOCYTES NFR BLD AUTO: 0.4 % (ref 0–0.5)
INHALED O2 CONCENTRATION: 21 %
INR PPP: 1.89 (ref 0.86–1.15)
KETONES UR QL STRIP: NEGATIVE
LEUKOCYTE ESTERASE UR QL STRIP.AUTO: NEGATIVE
LYMPHOCYTES # BLD AUTO: 0.37 10*3/MM3 (ref 0.7–3.1)
LYMPHOCYTES NFR BLD AUTO: 3.8 % (ref 19.6–45.3)
MCH RBC QN AUTO: 29.6 PG (ref 26.6–33)
MCHC RBC AUTO-ENTMCNC: 32.7 G/DL (ref 31.5–35.7)
MCV RBC AUTO: 90.6 FL (ref 79–97)
METHADONE UR QL SCN: NEGATIVE
MODALITY: ABNORMAL
MONOCYTES # BLD AUTO: 0.66 10*3/MM3 (ref 0.1–0.9)
MONOCYTES NFR BLD AUTO: 6.8 % (ref 5–12)
NEUTROPHILS NFR BLD AUTO: 8.57 10*3/MM3 (ref 1.7–7)
NEUTROPHILS NFR BLD AUTO: 87.9 % (ref 42.7–76)
NITRITE UR QL STRIP: NEGATIVE
NRBC BLD AUTO-RTO: 0 /100 WBC (ref 0–0.2)
OPIATES UR QL: NEGATIVE
OXYCODONE UR QL SCN: POSITIVE
PCO2 BLDA: 29.4 MM HG (ref 35–45)
PH BLDA: 7.55 PH UNITS (ref 7.35–7.45)
PH UR STRIP.AUTO: 7 [PH] (ref 5–8)
PLATELET # BLD AUTO: 71 10*3/MM3 (ref 140–450)
PMV BLD AUTO: 11.2 FL (ref 6–12)
PO2 BLD: 360 MM[HG] (ref 0–500)
PO2 BLDA: 75.5 MM HG (ref 80–100)
POTASSIUM SERPL-SCNC: 4.2 MMOL/L (ref 3.5–5.2)
PROCALCITONIN SERPL-MCNC: 0.16 NG/ML (ref 0–0.25)
PROT SERPL-MCNC: 5.9 G/DL (ref 6–8.5)
PROT UR QL STRIP: NEGATIVE
PROTHROMBIN TIME: 22.1 SECONDS (ref 11.8–14.9)
RBC # BLD AUTO: 3.31 10*6/MM3 (ref 4.14–5.8)
RSV RNA NPH QL NAA+NON-PROBE: NOT DETECTED
SALICYLATES SERPL-MCNC: <0.3 MG/DL
SAO2 % BLDCOA: 96.9 % (ref 95–99)
SARS-COV-2 RNA RESP QL NAA+PROBE: NOT DETECTED
SODIUM SERPL-SCNC: 141 MMOL/L (ref 136–145)
SP GR UR STRIP: 1.01 (ref 1–1.03)
TROPONIN T SERPL HS-MCNC: 28 NG/L
UROBILINOGEN UR QL STRIP: NORMAL
WBC NRBC COR # BLD AUTO: 9.75 10*3/MM3 (ref 3.4–10.8)

## 2024-07-08 PROCEDURE — 99285 EMERGENCY DEPT VISIT HI MDM: CPT

## 2024-07-08 PROCEDURE — 80307 DRUG TEST PRSMV CHEM ANLYZR: CPT | Performed by: EMERGENCY MEDICINE

## 2024-07-08 PROCEDURE — 82948 REAGENT STRIP/BLOOD GLUCOSE: CPT

## 2024-07-08 PROCEDURE — 25010000002 NALOXONE HCL 2 MG/2ML SOLUTION PREFILLED SYRINGE: Performed by: EMERGENCY MEDICINE

## 2024-07-08 PROCEDURE — 82077 ASSAY SPEC XCP UR&BREATH IA: CPT | Performed by: EMERGENCY MEDICINE

## 2024-07-08 PROCEDURE — 82550 ASSAY OF CK (CPK): CPT | Performed by: EMERGENCY MEDICINE

## 2024-07-08 PROCEDURE — 87637 SARSCOV2&INF A&B&RSV AMP PRB: CPT | Performed by: INTERNAL MEDICINE

## 2024-07-08 PROCEDURE — 82803 BLOOD GASES ANY COMBINATION: CPT

## 2024-07-08 PROCEDURE — 93005 ELECTROCARDIOGRAM TRACING: CPT | Performed by: EMERGENCY MEDICINE

## 2024-07-08 PROCEDURE — 25810000003 SODIUM CHLORIDE 0.9 % SOLUTION: Performed by: EMERGENCY MEDICINE

## 2024-07-08 PROCEDURE — 87040 BLOOD CULTURE FOR BACTERIA: CPT | Performed by: INTERNAL MEDICINE

## 2024-07-08 PROCEDURE — 25010000002 LEVETIRACETAM IN NACL 0.82% 500 MG/100ML SOLUTION: Performed by: INTERNAL MEDICINE

## 2024-07-08 PROCEDURE — 70450 CT HEAD/BRAIN W/O DYE: CPT

## 2024-07-08 PROCEDURE — 80179 DRUG ASSAY SALICYLATE: CPT | Performed by: EMERGENCY MEDICINE

## 2024-07-08 PROCEDURE — 71045 X-RAY EXAM CHEST 1 VIEW: CPT

## 2024-07-08 PROCEDURE — 84484 ASSAY OF TROPONIN QUANT: CPT | Performed by: EMERGENCY MEDICINE

## 2024-07-08 PROCEDURE — 84145 PROCALCITONIN (PCT): CPT | Performed by: INTERNAL MEDICINE

## 2024-07-08 PROCEDURE — 85025 COMPLETE CBC W/AUTO DIFF WBC: CPT | Performed by: EMERGENCY MEDICINE

## 2024-07-08 PROCEDURE — 81003 URINALYSIS AUTO W/O SCOPE: CPT | Performed by: EMERGENCY MEDICINE

## 2024-07-08 PROCEDURE — 25010000002 HEPARIN (PORCINE) PER 1000 UNITS: Performed by: INTERNAL MEDICINE

## 2024-07-08 PROCEDURE — 80143 DRUG ASSAY ACETAMINOPHEN: CPT | Performed by: EMERGENCY MEDICINE

## 2024-07-08 PROCEDURE — 85610 PROTHROMBIN TIME: CPT | Performed by: INTERNAL MEDICINE

## 2024-07-08 PROCEDURE — 36600 WITHDRAWAL OF ARTERIAL BLOOD: CPT

## 2024-07-08 PROCEDURE — 82140 ASSAY OF AMMONIA: CPT | Performed by: EMERGENCY MEDICINE

## 2024-07-08 PROCEDURE — 80053 COMPREHEN METABOLIC PANEL: CPT | Performed by: EMERGENCY MEDICINE

## 2024-07-08 PROCEDURE — 99223 1ST HOSP IP/OBS HIGH 75: CPT | Performed by: INTERNAL MEDICINE

## 2024-07-08 PROCEDURE — 93010 ELECTROCARDIOGRAM REPORT: CPT | Performed by: INTERNAL MEDICINE

## 2024-07-08 RX ORDER — LEVETIRACETAM 5 MG/ML
500 INJECTION INTRAVASCULAR EVERY 12 HOURS SCHEDULED
Status: DISCONTINUED | OUTPATIENT
Start: 2024-07-08 | End: 2024-07-10 | Stop reason: HOSPADM

## 2024-07-08 RX ORDER — HEPARIN SODIUM 5000 [USP'U]/ML
5000 INJECTION, SOLUTION INTRAVENOUS; SUBCUTANEOUS EVERY 12 HOURS SCHEDULED
Status: DISCONTINUED | OUTPATIENT
Start: 2024-07-08 | End: 2024-07-10 | Stop reason: HOSPADM

## 2024-07-08 RX ORDER — IBUPROFEN 600 MG/1
1 TABLET ORAL
Status: DISCONTINUED | OUTPATIENT
Start: 2024-07-08 | End: 2024-07-10 | Stop reason: HOSPADM

## 2024-07-08 RX ORDER — NALOXONE HYDROCHLORIDE 1 MG/ML
4 INJECTION INTRAMUSCULAR; INTRAVENOUS; SUBCUTANEOUS ONCE
Status: COMPLETED | OUTPATIENT
Start: 2024-07-08 | End: 2024-07-08

## 2024-07-08 RX ORDER — SODIUM CHLORIDE 9 MG/ML
40 INJECTION, SOLUTION INTRAVENOUS AS NEEDED
Status: DISCONTINUED | OUTPATIENT
Start: 2024-07-08 | End: 2024-07-10 | Stop reason: HOSPADM

## 2024-07-08 RX ORDER — NICOTINE POLACRILEX 4 MG
15 LOZENGE BUCCAL
Status: DISCONTINUED | OUTPATIENT
Start: 2024-07-08 | End: 2024-07-10 | Stop reason: HOSPADM

## 2024-07-08 RX ORDER — ACETAMINOPHEN 325 MG/1
650 TABLET ORAL EVERY 6 HOURS PRN
Status: DISCONTINUED | OUTPATIENT
Start: 2024-07-08 | End: 2024-07-10 | Stop reason: HOSPADM

## 2024-07-08 RX ORDER — SODIUM CHLORIDE 0.9 % (FLUSH) 0.9 %
10 SYRINGE (ML) INJECTION AS NEEDED
Status: DISCONTINUED | OUTPATIENT
Start: 2024-07-08 | End: 2024-07-10 | Stop reason: HOSPADM

## 2024-07-08 RX ORDER — DEXTROSE MONOHYDRATE 25 G/50ML
25 INJECTION, SOLUTION INTRAVENOUS
Status: DISCONTINUED | OUTPATIENT
Start: 2024-07-08 | End: 2024-07-10 | Stop reason: HOSPADM

## 2024-07-08 RX ORDER — SODIUM CHLORIDE 0.9 % (FLUSH) 0.9 %
10 SYRINGE (ML) INJECTION EVERY 12 HOURS SCHEDULED
Status: DISCONTINUED | OUTPATIENT
Start: 2024-07-08 | End: 2024-07-10 | Stop reason: HOSPADM

## 2024-07-08 RX ORDER — ACETAMINOPHEN 650 MG/1
650 SUPPOSITORY RECTAL EVERY 6 HOURS PRN
Status: DISCONTINUED | OUTPATIENT
Start: 2024-07-08 | End: 2024-07-10 | Stop reason: HOSPADM

## 2024-07-08 RX ADMIN — HEPARIN SODIUM 5000 UNITS: 5000 INJECTION INTRAVENOUS; SUBCUTANEOUS at 23:57

## 2024-07-08 RX ADMIN — LEVETIRACETAM 500 MG: 5 INJECTION INTRAVENOUS at 22:38

## 2024-07-08 RX ADMIN — SODIUM CHLORIDE 1000 ML: 9 INJECTION, SOLUTION INTRAVENOUS at 18:02

## 2024-07-08 RX ADMIN — Medication 10 ML: at 23:57

## 2024-07-08 RX ADMIN — ACETAMINOPHEN 650 MG: 650 SUPPOSITORY RECTAL at 21:14

## 2024-07-08 RX ADMIN — NALOXONE HYDROCHLORIDE 4 MG: 1 INJECTION PARENTERAL at 17:47

## 2024-07-08 NOTE — ED NOTES
Keke care and linen change for stool incontinence; assisted by tech Aniyah; straight cath for urine per MD order performed by sterile technique, almost assisted by tech Aniyah; 500ml dark yellow obtained; specimen sent to lab
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
## 2024-07-09 ENCOUNTER — APPOINTMENT (OUTPATIENT)
Dept: ULTRASOUND IMAGING | Facility: HOSPITAL | Age: 59
End: 2024-07-09
Payer: MEDICAID

## 2024-07-09 LAB
ALBUMIN SERPL-MCNC: 2.6 G/DL (ref 3.5–5.2)
ALBUMIN/GLOB SERPL: 1 G/DL
ALP SERPL-CCNC: 97 U/L (ref 39–117)
ALT SERPL W P-5'-P-CCNC: 14 U/L (ref 1–41)
ANION GAP SERPL CALCULATED.3IONS-SCNC: 9.1 MMOL/L (ref 5–15)
AST SERPL-CCNC: 37 U/L (ref 1–40)
BASOPHILS # BLD AUTO: 0.03 10*3/MM3 (ref 0–0.2)
BASOPHILS NFR BLD AUTO: 0.3 % (ref 0–1.5)
BILIRUB SERPL-MCNC: 2.3 MG/DL (ref 0–1.2)
BUN SERPL-MCNC: 19 MG/DL (ref 6–20)
BUN/CREAT SERPL: 23.2 (ref 7–25)
CALCIUM SPEC-SCNC: 8 MG/DL (ref 8.6–10.5)
CHLORIDE SERPL-SCNC: 106 MMOL/L (ref 98–107)
CO2 SERPL-SCNC: 23.9 MMOL/L (ref 22–29)
CREAT SERPL-MCNC: 0.82 MG/DL (ref 0.76–1.27)
DEPRECATED RDW RBC AUTO: 52.1 FL (ref 37–54)
EGFRCR SERPLBLD CKD-EPI 2021: 101.8 ML/MIN/1.73
EOSINOPHIL # BLD AUTO: 0.05 10*3/MM3 (ref 0–0.4)
EOSINOPHIL NFR BLD AUTO: 0.5 % (ref 0.3–6.2)
ERYTHROCYTE [DISTWIDTH] IN BLOOD BY AUTOMATED COUNT: 15.8 % (ref 12.3–15.4)
GLOBULIN UR ELPH-MCNC: 2.7 GM/DL
GLUCOSE BLDC GLUCOMTR-MCNC: 145 MG/DL (ref 70–99)
GLUCOSE BLDC GLUCOMTR-MCNC: 151 MG/DL (ref 70–99)
GLUCOSE BLDC GLUCOMTR-MCNC: 156 MG/DL (ref 70–99)
GLUCOSE BLDC GLUCOMTR-MCNC: 161 MG/DL (ref 70–99)
GLUCOSE BLDC GLUCOMTR-MCNC: 204 MG/DL (ref 70–99)
GLUCOSE SERPL-MCNC: 147 MG/DL (ref 65–99)
HCT VFR BLD AUTO: 28.4 % (ref 37.5–51)
HGB BLD-MCNC: 9.1 G/DL (ref 13–17.7)
IMM GRANULOCYTES # BLD AUTO: 0.06 10*3/MM3 (ref 0–0.05)
IMM GRANULOCYTES NFR BLD AUTO: 0.6 % (ref 0–0.5)
LYMPHOCYTES # BLD AUTO: 0.58 10*3/MM3 (ref 0.7–3.1)
LYMPHOCYTES NFR BLD AUTO: 5.9 % (ref 19.6–45.3)
MAGNESIUM SERPL-MCNC: 1.5 MG/DL (ref 1.6–2.6)
MCH RBC QN AUTO: 29 PG (ref 26.6–33)
MCHC RBC AUTO-ENTMCNC: 32 G/DL (ref 31.5–35.7)
MCV RBC AUTO: 90.4 FL (ref 79–97)
MONOCYTES # BLD AUTO: 0.63 10*3/MM3 (ref 0.1–0.9)
MONOCYTES NFR BLD AUTO: 6.4 % (ref 5–12)
NEUTROPHILS NFR BLD AUTO: 8.51 10*3/MM3 (ref 1.7–7)
NEUTROPHILS NFR BLD AUTO: 86.3 % (ref 42.7–76)
NRBC BLD AUTO-RTO: 0 /100 WBC (ref 0–0.2)
PLATELET # BLD AUTO: 63 10*3/MM3 (ref 140–450)
PMV BLD AUTO: 11.8 FL (ref 6–12)
POTASSIUM SERPL-SCNC: 3.3 MMOL/L (ref 3.5–5.2)
PROCALCITONIN SERPL-MCNC: 0.38 NG/ML (ref 0–0.25)
PROT SERPL-MCNC: 5.3 G/DL (ref 6–8.5)
QT INTERVAL: 373 MS
QTC INTERVAL: 472 MS
RBC # BLD AUTO: 3.14 10*6/MM3 (ref 4.14–5.8)
SODIUM SERPL-SCNC: 139 MMOL/L (ref 136–145)
WBC NRBC COR # BLD AUTO: 9.86 10*3/MM3 (ref 3.4–10.8)

## 2024-07-09 PROCEDURE — 82948 REAGENT STRIP/BLOOD GLUCOSE: CPT

## 2024-07-09 PROCEDURE — 63710000001 INSULIN REGULAR HUMAN PER 5 UNITS: Performed by: INTERNAL MEDICINE

## 2024-07-09 PROCEDURE — 25010000002 LEVETIRACETAM IN NACL 0.82% 500 MG/100ML SOLUTION: Performed by: INTERNAL MEDICINE

## 2024-07-09 PROCEDURE — 25010000002 HEPARIN (PORCINE) PER 1000 UNITS: Performed by: INTERNAL MEDICINE

## 2024-07-09 PROCEDURE — 85025 COMPLETE CBC W/AUTO DIFF WBC: CPT | Performed by: INTERNAL MEDICINE

## 2024-07-09 PROCEDURE — 83735 ASSAY OF MAGNESIUM: CPT | Performed by: INTERNAL MEDICINE

## 2024-07-09 PROCEDURE — 25010000002 CEFTRIAXONE PER 250 MG: Performed by: PHYSICIAN ASSISTANT

## 2024-07-09 PROCEDURE — 82948 REAGENT STRIP/BLOOD GLUCOSE: CPT | Performed by: INTERNAL MEDICINE

## 2024-07-09 PROCEDURE — 99232 SBSQ HOSP IP/OBS MODERATE 35: CPT | Performed by: FAMILY MEDICINE

## 2024-07-09 PROCEDURE — 63710000001 INSULIN GLARGINE PER 5 UNITS: Performed by: FAMILY MEDICINE

## 2024-07-09 PROCEDURE — 76700 US EXAM ABDOM COMPLETE: CPT

## 2024-07-09 PROCEDURE — 36415 COLL VENOUS BLD VENIPUNCTURE: CPT | Performed by: INTERNAL MEDICINE

## 2024-07-09 PROCEDURE — 80053 COMPREHEN METABOLIC PANEL: CPT | Performed by: INTERNAL MEDICINE

## 2024-07-09 PROCEDURE — 25010000002 PROCHLORPERAZINE 10 MG/2ML SOLUTION: Performed by: STUDENT IN AN ORGANIZED HEALTH CARE EDUCATION/TRAINING PROGRAM

## 2024-07-09 PROCEDURE — 25010000002 MAGNESIUM SULFATE 2 GM/50ML SOLUTION: Performed by: PHYSICIAN ASSISTANT

## 2024-07-09 PROCEDURE — 92610 EVALUATE SWALLOWING FUNCTION: CPT

## 2024-07-09 PROCEDURE — 84145 PROCALCITONIN (PCT): CPT | Performed by: INTERNAL MEDICINE

## 2024-07-09 RX ORDER — POLYETHYLENE GLYCOL 400 1 G/100ML
2 SOLUTION/ DROPS OPHTHALMIC 3 TIMES DAILY PRN
COMMUNITY

## 2024-07-09 RX ORDER — PANTOPRAZOLE SODIUM 40 MG/1
40 TABLET, DELAYED RELEASE ORAL DAILY
Status: DISCONTINUED | OUTPATIENT
Start: 2024-07-09 | End: 2024-07-10 | Stop reason: HOSPADM

## 2024-07-09 RX ORDER — FLUTICASONE PROPIONATE 50 MCG
1 SPRAY, SUSPENSION (ML) NASAL DAILY
COMMUNITY

## 2024-07-09 RX ORDER — BUSPIRONE HYDROCHLORIDE 7.5 MG/1
7.5 TABLET ORAL 3 TIMES DAILY
Status: DISCONTINUED | OUTPATIENT
Start: 2024-07-09 | End: 2024-07-10 | Stop reason: HOSPADM

## 2024-07-09 RX ORDER — SUCRALFATE 1 G/1
1 TABLET ORAL 4 TIMES DAILY
Status: DISCONTINUED | OUTPATIENT
Start: 2024-07-09 | End: 2024-07-10 | Stop reason: HOSPADM

## 2024-07-09 RX ORDER — MAGNESIUM SULFATE HEPTAHYDRATE 40 MG/ML
2 INJECTION, SOLUTION INTRAVENOUS ONCE
Status: COMPLETED | OUTPATIENT
Start: 2024-07-09 | End: 2024-07-09

## 2024-07-09 RX ORDER — OXYCODONE HYDROCHLORIDE 5 MG/1
5 TABLET ORAL EVERY 8 HOURS PRN
Status: DISCONTINUED | OUTPATIENT
Start: 2024-07-09 | End: 2024-07-10 | Stop reason: HOSPADM

## 2024-07-09 RX ORDER — LACTULOSE 10 G/15ML
20 SOLUTION ORAL 4 TIMES DAILY
Status: DISCONTINUED | OUTPATIENT
Start: 2024-07-09 | End: 2024-07-10 | Stop reason: HOSPADM

## 2024-07-09 RX ORDER — PROCHLORPERAZINE EDISYLATE 5 MG/ML
5 INJECTION INTRAMUSCULAR; INTRAVENOUS EVERY 6 HOURS PRN
Status: DISCONTINUED | OUTPATIENT
Start: 2024-07-09 | End: 2024-07-10 | Stop reason: HOSPADM

## 2024-07-09 RX ORDER — CARVEDILOL 6.25 MG/1
6.25 TABLET ORAL 2 TIMES DAILY WITH MEALS
Status: DISCONTINUED | OUTPATIENT
Start: 2024-07-09 | End: 2024-07-10 | Stop reason: HOSPADM

## 2024-07-09 RX ORDER — OXYCODONE HYDROCHLORIDE 5 MG/1
10 TABLET ORAL EVERY 8 HOURS PRN
Status: DISCONTINUED | OUTPATIENT
Start: 2024-07-09 | End: 2024-07-10 | Stop reason: HOSPADM

## 2024-07-09 RX ORDER — BUSPIRONE HYDROCHLORIDE 10 MG/1
10 TABLET ORAL 3 TIMES DAILY
COMMUNITY
Start: 2024-06-16 | End: 2024-07-10 | Stop reason: HOSPADM

## 2024-07-09 RX ORDER — POTASSIUM CHLORIDE 750 MG/1
30 CAPSULE, EXTENDED RELEASE ORAL ONCE
Status: COMPLETED | OUTPATIENT
Start: 2024-07-09 | End: 2024-07-09

## 2024-07-09 RX ORDER — ROPINIROLE 1 MG/1
1 TABLET, FILM COATED ORAL NIGHTLY
Status: DISCONTINUED | OUTPATIENT
Start: 2024-07-09 | End: 2024-07-10 | Stop reason: HOSPADM

## 2024-07-09 RX ORDER — LACTULOSE 10 G/15ML
20 SOLUTION ORAL 3 TIMES DAILY
Status: DISCONTINUED | OUTPATIENT
Start: 2024-07-09 | End: 2024-07-09

## 2024-07-09 RX ORDER — SERTRALINE HYDROCHLORIDE 100 MG/1
100 TABLET, FILM COATED ORAL NIGHTLY
Status: DISCONTINUED | OUTPATIENT
Start: 2024-07-09 | End: 2024-07-10 | Stop reason: HOSPADM

## 2024-07-09 RX ADMIN — BUSPIRONE HYDROCHLORIDE 7.5 MG: 7.5 TABLET ORAL at 11:55

## 2024-07-09 RX ADMIN — CARVEDILOL 6.25 MG: 6.25 TABLET, FILM COATED ORAL at 10:09

## 2024-07-09 RX ADMIN — SUCRALFATE 1 G: 1 TABLET ORAL at 13:50

## 2024-07-09 RX ADMIN — CARVEDILOL 6.25 MG: 6.25 TABLET, FILM COATED ORAL at 17:36

## 2024-07-09 RX ADMIN — SERTRALINE HYDROCHLORIDE 100 MG: 100 TABLET ORAL at 20:59

## 2024-07-09 RX ADMIN — OXYCODONE HYDROCHLORIDE 5 MG: 5 TABLET ORAL at 17:40

## 2024-07-09 RX ADMIN — CEFTRIAXONE SODIUM 2000 MG: 2 INJECTION, POWDER, FOR SOLUTION INTRAMUSCULAR; INTRAVENOUS at 15:07

## 2024-07-09 RX ADMIN — PROCHLORPERAZINE EDISYLATE 5 MG: 5 INJECTION INTRAMUSCULAR; INTRAVENOUS at 22:17

## 2024-07-09 RX ADMIN — INSULIN HUMAN 2 UNITS: 100 INJECTION, SOLUTION PARENTERAL at 13:02

## 2024-07-09 RX ADMIN — SUCRALFATE 1 G: 1 TABLET ORAL at 17:36

## 2024-07-09 RX ADMIN — ROPINIROLE HYDROCHLORIDE 1 MG: 1 TABLET, FILM COATED ORAL at 20:59

## 2024-07-09 RX ADMIN — RIFAXIMIN 550 MG: 550 TABLET ORAL at 10:09

## 2024-07-09 RX ADMIN — PANTOPRAZOLE SODIUM 40 MG: 40 TABLET, DELAYED RELEASE ORAL at 10:09

## 2024-07-09 RX ADMIN — RIFAXIMIN 550 MG: 550 TABLET ORAL at 20:59

## 2024-07-09 RX ADMIN — SUCRALFATE 1 G: 1 TABLET ORAL at 20:58

## 2024-07-09 RX ADMIN — ACETAMINOPHEN 650 MG: 325 TABLET ORAL at 16:26

## 2024-07-09 RX ADMIN — LACTULOSE 20 G: 10 SOLUTION ORAL at 09:30

## 2024-07-09 RX ADMIN — BUSPIRONE HYDROCHLORIDE 7.5 MG: 7.5 TABLET ORAL at 21:11

## 2024-07-09 RX ADMIN — Medication 10 ML: at 09:30

## 2024-07-09 RX ADMIN — INSULIN HUMAN 2 UNITS: 100 INJECTION, SOLUTION PARENTERAL at 05:46

## 2024-07-09 RX ADMIN — LACTULOSE 20 G: 10 SOLUTION ORAL at 17:36

## 2024-07-09 RX ADMIN — INSULIN GLARGINE 10 UNITS: 100 INJECTION, SOLUTION SUBCUTANEOUS at 10:10

## 2024-07-09 RX ADMIN — Medication 10 ML: at 20:58

## 2024-07-09 RX ADMIN — POTASSIUM CHLORIDE 30 MEQ: 750 CAPSULE, EXTENDED RELEASE ORAL at 15:08

## 2024-07-09 RX ADMIN — BUSPIRONE HYDROCHLORIDE 7.5 MG: 7.5 TABLET ORAL at 16:26

## 2024-07-09 RX ADMIN — LEVETIRACETAM 500 MG: 5 INJECTION INTRAVENOUS at 20:58

## 2024-07-09 RX ADMIN — HEPARIN SODIUM 5000 UNITS: 5000 INJECTION INTRAVENOUS; SUBCUTANEOUS at 20:59

## 2024-07-09 RX ADMIN — MAGNESIUM SULFATE HEPTAHYDRATE 2 G: 40 INJECTION, SOLUTION INTRAVENOUS at 15:08

## 2024-07-09 RX ADMIN — LACTULOSE 20 G: 10 SOLUTION ORAL at 20:59

## 2024-07-09 RX ADMIN — HEPARIN SODIUM 5000 UNITS: 5000 INJECTION INTRAVENOUS; SUBCUTANEOUS at 09:30

## 2024-07-09 RX ADMIN — LEVETIRACETAM 500 MG: 5 INJECTION INTRAVENOUS at 09:30

## 2024-07-09 NOTE — PROGRESS NOTES
[facility]   Hospitalist Progress Note  Date: 2024  Patient Name: Nic Nicolas  : 1965  MRN: [id number]  Date of admission: 2024      Subjective   Subjective     Chief Complaint: Confusion    Summary: Patient is a 59-year-old male past medical history significant for Ruiz cirrhosis with history of hepatic encephalopathy variceal bleed, diabetes, DVT, hypertension, obstructive sleep apnea, TBI, asthma, COPD, anxiety, depression, that is currently residing at long-term care facility evidently patient left nursing facility to go to a local store came back confused EMS called patient was brought to the emergency department ammonia level was elevated this current concern for possible encephalopathy secondary to drug use patient has been admitted to the hospitalist service for further medical evaluation and treatment.    Interval Followup: Patient seen and examined resting comfortably will awaken and answer questions appropriately continuing with lactulose    Review of Systems   All systems were reviewed and negative except for: Weakness fatigue    Objective   Objective     Vitals:   Temp:  [98.2 °F (36.8 °C)-100.4 °F (38 °C)] 98.4 °F (36.9 °C)  Heart Rate:  [] 73  Resp:  [20-25] 20  BP: (109-155)/(40-85) 153/58  Physical Exam   Constitutional: Sleepy but awakens  Respiratory diminished  Cardiovascular RRR  GI: Abdomen obese soft nontender    Result Review    Result Review:  I have personally reviewed the pertinent results from the past 24 hours to 2024 13:55 EDT and agree with these findings:  [x]  Laboratory   CBC          2024    12:58 2024    17:31 2024    07:56   CBC   WBC 3.83  9.75  9.86    RBC 3.49  3.31  3.14    Hemoglobin 10.3  9.8  9.1    Hematocrit 31.1  30.0  28.4    MCV 89.1  90.6  90.4    MCH 29.5  29.6  29.0    MCHC 33.1  32.7  32.0    RDW 18.1  15.7  15.8    Platelets 67  71  63      BMP          2024    12:58 2024    17:31 2024    07:56   BMP    BUN 14  19  19    Creatinine 0.82  0.91  0.82    Sodium 139  141  139    Potassium 3.2  4.2  3.3    Chloride 101  105  106    CO2 26.7  27.3  23.9    Calcium 8.6  8.5  8.0      LIVER FUNCTION TESTS:      Lab 07/09/24  0756 07/08/24  1731   TOTAL PROTEIN 5.3* 5.9*   ALBUMIN 2.6* 3.1*   GLOBULIN 2.7 2.8   ALT (SGPT) 14 20   AST (SGOT) 37 46*   BILIRUBIN 2.3* 1.7*   ALK PHOS 97 109       [x]  Microbiology   Microbiology Results (last 10 days)       Procedure Component Value - Date/Time    COVID-19, FLU A/B, RSV PCR 1 HR TAT - Swab, Nasopharynx [[id number]]  (Normal) Collected: 07/08/24 2123    Lab Status: Final result Specimen: Swab from Nasopharynx Updated: 07/08/24 2226     COVID19 Not Detected     Influenza A PCR Not Detected     Influenza B PCR Not Detected     RSV, PCR Not Detected    Narrative:      Fact sheet for providers: https://www.fda.gov/media/602101/download    Fact sheet for patients: https://www.fda.gov/media/526313/download    Test performed by PCR.              [x]  Radiology XR Chest 1 View    Result Date: 7/8/2024  Impression: No definite evidence of acute pulmonary process Electronically Signed: Himanshu Barcenas MD  7/8/2024 6:40 PM EDT  Workstation ID: [id number]    CT Head Without Contrast    Result Date: 7/8/2024  Impression: No acute intracranial abnormality. Electronically Signed: Wan Luque DO  7/8/2024 5:50 PM EDT  Workstation ID: [id number]       []  EKG/Telemetry   ECG 12 Lead Chest Pain   Preliminary Result   HEART RATE=96  bpm   RR Nhgowppu=177  ms   OR Djjumbrf=333  ms   P Horizontal Axis=6  deg   P Front Axis=48  deg   QRSD Interval=94  ms   QT Gfjdahcu=896  ms   QFhW=418  ms   QRS Axis=63  deg   T Wave Axis=43  deg   - OTHERWISE NORMAL ECG -   Sinus rhythm   Borderline low voltage, extremity leads   Date and Time of Study:2024-07-08 17:48:59          []  Cardiology/Vascular   []  Pathology  [x]  Old records  []  Other:    Assessment & Plan   Assessment / Plan    Assessment:    Encephalopathy hepatic versus toxic drug-induced  Ruiz cirrhosis  Type 2 diabetes on insulin  Obstructive sleep apnea   TBI  asthma COPD  Anxiety depression  GERD    Plan:    Continue with lactulose and rifaximin recheck ammonia level in a.m.  Treat empirically with Rocephin while hospitalized  Consider checking ultrasound of abdomen or CT abdomen pelvis to rule out underlying ascites/infection  Continuing home medications  PT OT consultation  Further treatment contingent upon his hospital course     Discussed plan with RN.    VTE Prophylaxis:  Pharmacologic VTE prophylaxis orders are present.        CODE STATUS:   Level Of Support Discussed With: Patient  Code Status (Patient has no pulse and is not breathing): CPR (Attempt to Resuscitate)  Medical Interventions (Patient has pulse or is breathing): Full Support  Electronically signed by SOLEDAD Oliva, 07/09/24, 2:08 PM EDT.      Attending documentation:  I reviewed the above documentation and independently reviewed and rounded and evaluated the patient and discussed the care plan with TERESA Woodard PA-C, I agree with his findings and plan as documented, what I have added to the care plan and modified is as follows in my documentation and my medical decision making; 59-year-old male with history of Ruiz cirrhosis, diabetes, DVT, hypertension, obstructive sleep apnea, TBI, COPD, asthma, depression, anxiety, hospitalized on 7/8/2024 for chief complaint of altered mental status.  Concern for metabolic encephalopathy due to drug use versus hepatic encephalopathy due to elevated ammonia level, with low-grade fever being monitored, interval follow-up: Seen and examined, no acute distress, no acute major overnight events, patient more alert and awake this morning, white blood cell count down to 9000, hemoglobin 9.1, procalcitonin 0.38, potassium 3.3, sodium 139, creatinine 0.82, was able to answer basic questions, still feels to be encephalopathic,  patient is having bowel movements, working towards improving mentation.  So far cultures are negative.  Review of systems: Limited review of systems obtained, what we could obtain was he is still weak and lethargic.  On physical exam elderly appearing male, no acute distress, arousable, confused, somewhat lethargic, regular rate and rhythm, clear to auscultation abdomen soft distended abdomen, oriented to person.  Does know that he is in [place].  Assessment as above, acute metabolic encephalopathy due to drugs versus hepatic encephalopathy due to hyperammonemia, with underlying ROMO cirrhosis, with low-grade fever upon initial presentation among other chronic medical conditions, plan, follow-up blood cultures, restart lactulose and rifaximin, may need to add Flagyl if patient's mentation pneumonia does not clear, resumed on other home medications including Coreg, BuSpar at a reduced dose of 7.5 mg 3 times daily in case this is is causing him to be lethargic, continue potassium replacement, resume Carafate, regular diet if he can tolerate, continue Requip at night, Lantus 10 units in the morning, insulin sliding scale coverage, a.m. labs, full code, DVT prophylax with heparin, clinical course dictate further management, discussed with nurse at the bedside.  More than 51% of the time of this patient's encounter was performed by me, this included face-to-face time, planning and coordinating, medical decision making and critical thinking personally done by me.    Electronically signed by Bandar Burch MD, 7/9/2024, 13:58 EDT.    Portions of this documentation were transcribed electronically from a voice recognition software.  I confirm all data accurately represents the service(s) I performed at today's visit.
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
## 2024-07-09 NOTE — H&P
[facility] HISTORY AND PHYSICAL  Date: 2024   Patient Name: Nic Nicolas  : 1965  MRN: [id number]  Primary Care Physician:  Sheldon Carcamo MD  Date of admission: 2024    Subjective   Subjective     Chief Complaint: Confusion    HPI:    Nic Nicolas is a 59 y.o. male past medical history of Ruiz cirrhosis with history of hepatic encephalopathy, diabetes on insulin, DVT in the past, hypertension, struct of sleep apnea, TBI, asthma/COPD, and anxiety/depression who presents with altered mental status    The patient was recent admitted to our hospital for almost 2 weeks in May for hepatic encephalopathy and eventually required some mechanical ventilation.  Patient is still really confused and history is primarily obtained from the nursing facility which is where he resides.  Sounds like he was doing relatively well today and went to the corner store and when he came back he was extremely confused and EMS was called because the patient was completely out of it.  He was protecting his airway but was almost nonresponsive.  As result but arrived in the ER for further evaluation    In the emergency department the patient vital signs are as follows: Temperature 98.8, pulse 97, respiratory is 18, blood pressure 140/74, 96% on room air.  CBC shows no concerning abnormalities.  Hemoglobin is 9.8, platelets are 71.  CMP shows no concerning abnormalities.  Troponin slightly elevated at 28.  Monocyte elevated 83.  ABG is 7.5 1975.  ER physician states the patient is protecting his airway.  Urinalysis is bland.  Urine tox screen is positive for oxycodone.  Chest x-ray is clear.  CT of the head shows no acute abnormalities.  Patient was given Narcan and normal saline emergency department.  Very large bowel movement in the emergency department.  The patient admitted to hospital for acute metabolic encephalopathy which is most likely due to combination of opioid use and hepatic  encephalopathy.    When I saw the patient and bedside nurse reported the patient had a fever to 100.4 and that he had for bowel movements that smelled like lactulose.    Cannot review systems        Personal History     Past Medical History:  ROMO cirrhosis history of hepatic encephalopathy  Diabetes on insulin  DVT in the past  Hypertension  Obstructive sleep apnea  TBI  Asthma/COPD  GERD  Anxiety/depression     Past Surgical History:  Colonoscopy  Endoscopy  Knee surgery  Pelvic fracture surger     Family History:   Diabetes  Lung cancer     Social History:   Never smoked.  Never drank.    Home Medications:  Diclofenac Sodium, Insulin Lispro (1 Unit Dial), acetaminophen, albuterol sulfate HFA, busPIRone, carvedilol, cetirizine, cholecalciferol, ferrous gluconate, fluticasone, furosemide, insulin detemir, ipratropium-albuterol, lactulose, levETIRAcetam, magnesium oxide, naloxone, nystatin, oxyCODONE, pantoprazole, polyethyl glycol-propyl glycol, rOPINIRole, riFAXIMin, sertraline, simethicone, spironolactone, and sucralfate    Allergies:  No Known Allergies      Objective   Objective     Vitals:   Temp:  [98.8 °F (37.1 °C)-100.4 °F (38 °C)] 100.4 °F (38 °C)  Heart Rate:  [] 94  Resp:  [25] 25  BP: (116-155)/(51-85) 131/51    Physical Exam    Constitutional: Confused.   Eyes: Pupils equal, sclerae anicteric, no conjunctival injection   HENT: NCAT, mucous membranes moist   Neck: Supple, no thyromegaly, no lymphadenopathy, trachea midline   Respiratory: Clear to auscultation bilaterally, nonlabored respirations    Cardiovascular: RRR, no murmurs, rubs, or gallops, palpable pedal pulses bilaterally   Gastrointestinal: Positive bowel sounds, soft, nontender, nondistended   Musculoskeletal: No bilateral ankle edema, no clubbing or cyanosis to extremities   Psychiatric: Appropriate affect, cooperative   Neurologic: Oriented x 0, moving all extremities.  Patient continues to cry out yes   Skin: No rashes     Result  Review    Result Review:  I have personally reviewed the results from the time of this admission to 7/8/2024 21:07 EDT and agree with these findings:  Ammonia is 83  Urinalysis is bland  Chest x-ray was bland  CT of the head showed no acute abnormalities      Assessment & Plan   Assessment / Plan     Assessment/Plan:   Acute metabolic encephalopathy due to drug usage versus hepatic encephalopathy versus a combination of both  ROMO cirrhosis with severe history of hepatic encephalopathy  Fever to 100.4  Type 2 diabetes on insulin  Obstructive sleep  TBI  Asthma/COPD  Anxiety/depression  History of GI bleed secondary to peptic ulcer  Obesity with BMI 43    Plan:  -- Admit to hospital service  -- Patient had low-grade fever so we will check for influenza and COVID as a possible infectious source  -- Blood culture sent  -- Urine was bland  -- Chest x-ray did not show infection but due to concern for possible aspiration will send procalcitonin now and again in the morning  -- Would normally be aggressive with lactulose but the patient has had several bowel movements in the ER already so we will just do lactulose 3 times daily and will increase tomorrow if need be  -- Will keep him n.p.o. at this point cannot safely eat  -- Patient should not be on opioid pain medications as these will continue to put him at high risk for developing hepatic encephalopathy  -- Patient had multiple bowel movements once in the ER we will not send for C. difficile as he is on lactulose at home but if he continues to have large amounts of stool would have to consider infectious diarrhea  -- Sliding scale every 6 hours while not eating  -- Continue Keppra 500 mg twice daily IV since he cannot take p.o. at this time          VTE Prophylaxis:  Heparin Q12 as long as platelets are above 50,000        CODE STATUS:    Level Of Support Discussed With: Patient  Code Status (Patient has no pulse and is not breathing): CPR (Attempt to  Resuscitate)  Medical Interventions (Patient has pulse or is breathing): Full Support      Admission Status:  I believe this patient meets admission status.    Electronically signed by Daniel Berrios MD, 07/08/24, 8:43 PM EDT.

## 2024-07-09 NOTE — SIGNIFICANT NOTE
07/09/24 1119   Physical Therapy Time and Intention   Mode of Treatment physical therapy   Session Not Performed patient/family declined evaluation   Comment, Session Not Performed Pt declined evaluation; Not feeling well

## 2024-07-09 NOTE — ED NOTES
"Nursing report ED to floor  Nic Nicolas  59 y.o.  male    HPI :  HPI (Adult)  Stated Reason for Visit: .  History Obtained From: EMS    Chief Complaint  Chief Complaint   Patient presents with    Altered Mental Status     from [facility] by EMS; staff reported pt was very violent this morning hitting, cussing and urinating on staff; they then noted pt was lethargic and seemingly \"under the influence of something\" but he had not rec'd any meds; ems repotrs staff states known hx of \"stashing pills and taking all at once\"- takes oxycodone 10; glu 120 en route       Admitting doctor:   No admitting provider for patient encounter.    Admitting diagnosis:   The encounter diagnosis was Altered mental status, unspecified altered mental status type.    Code status:   Current Code Status       Date Active Code Status Order ID Comments User Context       7/8/2024 2103 CPR (Attempt to Resuscitate) [id number]  Daniel Berrios MD ED        Question Answer    Code Status (Patient has no pulse and is not breathing) CPR (Attempt to Resuscitate)    Medical Interventions (Patient has pulse or is breathing) Full Support    Level Of Support Discussed With Patient                    Allergies:   Patient has no known allergies.    Isolation:   No active isolations    Intake and Output    Intake/Output Summary (Last 24 hours) at 7/8/2024 2155  Last data filed at 7/8/2024 2100  Gross per 24 hour   Intake 1000 ml   Output 500 ml   Net 500 ml       Weight:       07/08/24  1704   Weight: 127 kg (280 lb 6.8 oz)       Most recent vitals:   Vitals:    07/08/24 1930 07/08/24 2045 07/08/24 2101 07/08/24 2145   BP: 145/74 131/51  127/43   BP Location: Left arm Right arm     Patient Position: Lying Lying     Pulse: 97 94  94   Resp: 25 25  22   Temp:   100.4 °F (38 °C)    TempSrc:   Rectal    SpO2: 96% 96%  96%   Weight:       Height:           Active LDAs/IV Access:   Lines, Drains & Airways       Active LDAs       Name Placement date Placement " time Site Days    Peripheral IV 07/08/24 1747 Left;Posterior Hand 07/08/24 1747  Hand  less than 1    Peripheral IV 07/08/24 1906 Posterior;Right Hand 07/08/24 1906  Hand  less than 1                    Labs (abnormal labs have a star):   Labs Reviewed   COMPREHENSIVE METABOLIC PANEL - Abnormal; Notable for the following components:       Result Value    Glucose 125 (*)     Calcium 8.5 (*)     Total Protein 5.9 (*)     Albumin 3.1 (*)     AST (SGOT) 46 (*)     Total Bilirubin 1.7 (*)     All other components within normal limits    Narrative:     GFR Normal >60  Chronic Kidney Disease <60  Kidney Failure <15     SINGLE HS TROPONIN T - Abnormal; Notable for the following components:    HS Troponin T 28 (*)     All other components within normal limits    Narrative:     High Sensitive Troponin T Reference Range:  <14.0 ng/L- Negative Female for AMI  <22.0 ng/L- Negative Male for AMI  >=14 - Abnormal Female indicating possible myocardial injury.  >=22 - Abnormal Male indicating possible myocardial injury.   Clinicians would have to utilize clinical acumen, EKG, Troponin, and serial changes to determine if it is an Acute Myocardial Infarction or myocardial injury due to an underlying chronic condition.        URINE DRUG SCREEN - Abnormal; Notable for the following components:    Oxycodone Screen, Urine Positive (*)     All other components within normal limits    Narrative:     Negative Thresholds Per Drugs Screened:    Amphetamines                 500 ng/ml  Barbiturates                 200 ng/ml  Benzodiazepines              100 ng/ml  Cocaine                      300 ng/ml  Methadone                    300 ng/ml  Opiates                      300 ng/ml  Oxycodone                    100 ng/ml  THC                           50 ng/ml  Fentanyl                       5 ng/ml      The Normal Value for all drugs tested is negative. This report includes final unconfirmed screening results to be used for medical treatment  purposes only. Unconfirmed results must not be used for non-medical purposes such as employment or legal testing. Clinical consideration should be applied to any drug of abuse test, particularly when unconfirmed results are used.           CBC WITH AUTO DIFFERENTIAL - Abnormal; Notable for the following components:    RBC 3.31 (*)     Hemoglobin 9.8 (*)     Hematocrit 30.0 (*)     RDW 15.7 (*)     Platelets 71 (*)     Neutrophil % 87.9 (*)     Lymphocyte % 3.8 (*)     Neutrophils, Absolute 8.57 (*)     Lymphocytes, Absolute 0.37 (*)     All other components within normal limits   BLOOD GAS, ARTERIAL - Abnormal; Notable for the following components:    pH, Arterial 7.555 (*)     pCO2, Arterial 29.4 (*)     pO2, Arterial 75.5 (*)     Base Excess, Arterial 4.0 (*)     Hemoglobin, Blood Gas 10.0 (*)     Hematocrit, Blood Gas 29.0 (*)     All other components within normal limits   AMMONIA - Abnormal; Notable for the following components:    Ammonia 83 (*)     All other components within normal limits   PROTIME-INR - Abnormal; Notable for the following components:    Protime 22.1 (*)     INR 1.89 (*)     All other components within normal limits    Narrative:     Suggested Therapeutic Ranges For Oral Anticoagulant Therapy:  Level of Therapy                      INR Target Range  Standard Dose                            2.0-3.0  High Dose                                2.5-3.5  Patients not receiving anticoagulant  Therapy Normal Range                     0.86-1.15   URINALYSIS W/ MICROSCOPIC IF INDICATED (NO CULTURE) - Normal    Narrative:     Urine microscopic not indicated.   ACETAMINOPHEN LEVEL - Normal   SALICYLATE LEVEL - Normal   CK - Normal   PROCALCITONIN - Normal    Narrative:     As a Marker for Sepsis (Non-Neonates):    1. <0.5 ng/mL represents a low risk of severe sepsis and/or septic shock.  2. >2 ng/mL represents a high risk of severe sepsis and/or septic shock.    As a Marker for Lower Respiratory Tract  "Infections that require antibiotic therapy:    PCT on Admission    Antibiotic Therapy       6-12 Hrs later    >0.5                Strongly Recommended  >0.25 - <0.5        Recommended  0.1 - 0.25          Discouraged              Remeasure/reassess PCT  <0.1                Strongly Discouraged     Remeasure/reassess PCT    As 28 day mortality risk marker: \"Change in Procalcitonin Result\" (>80% or <=80%) if Day 0 (or Day 1) and Day 4 values are available. Refer to [email or web address]    Change in PCT <=80%  A decrease of PCT levels below or equal to 80% defines a positive change in PCT test result representing a higher risk for 28-day all-cause mortality of patients diagnosed with severe sepsis for septic shock.    Change in PCT >80%  A decrease of PCT levels of more than 80% defines a negative change in PCT result representing a lower risk for 28-day all-cause mortality of patients diagnosed with severe sepsis or septic shock.    This test is Prognostic not Diagnostic, if elevated correlate with clinical findings before administering antibiotic treatment.       COVID-19/FLUA&B/RSV, NP SWAB IN TRANSPORT MEDIA 1 HR TAT   BLOOD CULTURE   BLOOD CULTURE   ETHANOL    Narrative:     Ethanol (Plasma)  <10 Essentially Negative    Toxic Concentrations           mg/dL    Flushing, slowing of reflexes    Impaired visual activity         Depression of CNS              >100  Possible Coma                  >300      BLOOD GAS, ARTERIAL   POCT GLUCOSE FINGERSTICK   POCT GLUCOSE FINGERSTICK   POCT GLUCOSE FINGERSTICK   POCT GLUCOSE FINGERSTICK   CBC AND DIFFERENTIAL    Narrative:     The following orders were created for panel order CBC & Differential.  Procedure                               Abnormality         Status                     ---------                               -----------         ------                     CBC Auto Differential[[id number]]        Abnormal            Final result          "        Please view results for these tests on the individual orders.       EKG:   ECG 12 Lead Chest Pain   Preliminary Result   HEART RATE=96  bpm   RR Igyshcin=175  ms   KS Dfmzaznr=258  ms   P Horizontal Axis=6  deg   P Front Axis=48  deg   QRSD Interval=94  ms   QT Ggfmpgps=016  ms   LClQ=131  ms   QRS Axis=63  deg   T Wave Axis=43  deg   - OTHERWISE NORMAL ECG -   Sinus rhythm   Borderline low voltage, extremity leads   Date and Time of Study:2024-07-08 17:48:59          Meds given in ED:   Medications   acetaminophen (TYLENOL) tablet 650 mg ( Oral Not Given:  See Alt 7/8/24 2114)     Or   acetaminophen (TYLENOL) suppository 650 mg (650 mg Rectal Given 7/8/24 2114)   sodium chloride 0.9 % flush 10 mL (has no administration in time range)   sodium chloride 0.9 % flush 10 mL (has no administration in time range)   sodium chloride 0.9 % infusion 40 mL (has no administration in time range)   heparin (porcine) 5000 UNIT/ML injection 5,000 Units (has no administration in time range)   dextrose (GLUTOSE) oral gel 15 g (has no administration in time range)   dextrose (D50W) (25 g/50 mL) IV injection 25 g (has no administration in time range)   glucagon (GLUCAGEN) injection 1 mg (has no administration in time range)   insulin regular (humuLIN R,novoLIN R) injection 2-7 Units (has no administration in time range)   levETIRAcetam in NaCl 0.82% (KEPPRA) IVPB 500 mg (has no administration in time range)   sodium chloride 0.9 % bolus 1,000 mL (0 mL Intravenous Stopped 7/8/24 2100)   Naloxone HCl (NARCAN) injection 4 mg (4 mg Intravenous Given 7/8/24 1747)       Imaging results:  XR Chest 1 View    Result Date: 7/8/2024  Impression: No definite evidence of acute pulmonary process Electronically Signed: Himanshu Barcenas MD  7/8/2024 6:40 PM EDT  Workstation ID: [id number]    CT Head Without Contrast    Result Date: 7/8/2024  Impression: No acute intracranial abnormality. Electronically Signed: Wan Luque DO  7/8/2024 5:50  PM EDT  Workstation ID: [id number]     Ambulatory status:   - bedrest    Social issues:   Social History     Socioeconomic History    Marital status:     Number of children: 2   Tobacco Use    Smoking status: Never     Passive exposure: Past    Smokeless tobacco: Never    Tobacco comments:     no second hand smoke exposure   Vaping Use    Vaping status: Never Used   Substance and Sexual Activity    Alcohol use: Not Currently    Drug use: Never    Sexual activity: Defer       Peripheral Neurovascular  Peripheral Neurovascular (Adult)  Peripheral Neurovascular WDL: .WDL except, all (LLE discoloration appears PVD)    Neuro Cognitive  Neuro Cognitive (Adult)  Cognitive/Neuro/Behavioral WDL: .WDL except (no nuero change since arrival; MD notified no improvement s/p meds; ctm)  Level of Consciousness: Responds to Pain  Arousal Level: arouses to pain  Orientation: disoriented x 4  Speech: illogical  Mood/Behavior: uncooperative  Additional Documentation: Pupils (Group)  Pupils  Pupil PERRLA:  (4mm bilaterally, sluggish to react)  Motor Response  Motor Response: general motor response  General Motor Response: purposeful/movement localizing    Learning  Learning Assessment (Adult)  Learning Readiness and Ability: cognitive limitation noted  Education Provided  Person Taught: patient  Teaching Method: verbal instruction  Teaching Focus: symptom/problem overview  Education Outcome Evaluation: needs reinforcement, unable to teach back    Respiratory  Respiratory (Adult)  Airway WDL: WDL  Respiratory WDL  Respiratory WDL: .WDL except, all  Rhythm/Pattern, Respiratory: snoring, tachypneic  Expansion/Accessory Muscles/Retractions: diaphragmatic    Abdominal Pain       Pain Assessments  Pain (Adult)  (0-10) Pain Rating: Rest: 0 (марина due to AMS)    NIH Stroke Scale   N/a    Grazyna Guzmán RN  07/08/24 21:55 EDT

## 2024-07-09 NOTE — ED NOTES
Admitting MD Berrios @ bedside while providing venus care stool incontinence; pt felt warm while changing so I did rectal temp- 100.4, MD Berrios aware; otherwisae no acute change; pt still altered; vss on room air; ctm
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
## 2024-07-09 NOTE — PLAN OF CARE
Goal Outcome Evaluation: Patient more alert today. Poor po intake despite education on pros and cons. Drinking liquids. New iv placed by IV team.

## 2024-07-09 NOTE — PLAN OF CARE
Goal Outcome Evaluation:  Plan of Care Reviewed With: patient        Progress: no change  Outcome Evaluation: Pt was new ED admission this shift. Pt is alert to self and responds to voice and pain this shift. Pt did not show any signs of pain or distress this shift. Monitoring blood glucose as ordered. Skin care provided as needed. Pt is currently NPO as ordered due to decreased LOC and per MD notes, SLP consulted per protocol. Pt is currently resting with no s/s distress or agitation at this time, call light in reach. No new issues or new needs noted at this time.

## 2024-07-09 NOTE — ED NOTES
Cultures obtained x 2; [facility]  care for another diarrhea; vss, ctm; no acute change; pt still altered

## 2024-07-09 NOTE — CONSULTS
Patient is a nursing home resident, where they manage his diabetes and determine treatment. Patient's most recent A1c was 7.1% in January 2024 with an estimated average glucose of 157 mg/dl. Please reach out with any specific questions or needs.

## 2024-07-10 VITALS
BODY MASS INDEX: 39.58 KG/M2 | HEART RATE: 84 BPM | TEMPERATURE: 97.3 F | RESPIRATION RATE: 20 BRPM | SYSTOLIC BLOOD PRESSURE: 136 MMHG | WEIGHT: 267.2 LBS | DIASTOLIC BLOOD PRESSURE: 66 MMHG | OXYGEN SATURATION: 95 % | HEIGHT: 69 IN

## 2024-07-10 PROBLEM — R41.82 AMS (ALTERED MENTAL STATUS): Status: RESOLVED | Noted: 2024-07-08 | Resolved: 2024-07-10

## 2024-07-10 PROBLEM — M51.36 LUMBAR DEGENERATIVE DISC DISEASE: Status: ACTIVE | Noted: 2024-07-10

## 2024-07-10 PROBLEM — M51.369 LUMBAR DEGENERATIVE DISC DISEASE: Status: ACTIVE | Noted: 2024-07-10

## 2024-07-10 LAB
ALBUMIN SERPL-MCNC: 2.7 G/DL (ref 3.5–5.2)
ALP SERPL-CCNC: 97 U/L (ref 39–117)
ALT SERPL W P-5'-P-CCNC: 16 U/L (ref 1–41)
AMMONIA BLD-SCNC: 52 UMOL/L (ref 16–60)
ANION GAP SERPL CALCULATED.3IONS-SCNC: 9.4 MMOL/L (ref 5–15)
AST SERPL-CCNC: 44 U/L (ref 1–40)
BASOPHILS # BLD AUTO: 0.04 10*3/MM3 (ref 0–0.2)
BASOPHILS NFR BLD AUTO: 0.6 % (ref 0–1.5)
BILIRUB CONJ SERPL-MCNC: 0.5 MG/DL (ref 0–0.3)
BILIRUB INDIRECT SERPL-MCNC: 0.6 MG/DL
BILIRUB SERPL-MCNC: 1.1 MG/DL (ref 0–1.2)
BUN SERPL-MCNC: 21 MG/DL (ref 6–20)
BUN/CREAT SERPL: 26.3 (ref 7–25)
CALCIUM SPEC-SCNC: 7.8 MG/DL (ref 8.6–10.5)
CHLORIDE SERPL-SCNC: 108 MMOL/L (ref 98–107)
CO2 SERPL-SCNC: 23.6 MMOL/L (ref 22–29)
CREAT SERPL-MCNC: 0.8 MG/DL (ref 0.76–1.27)
DEPRECATED RDW RBC AUTO: 51.9 FL (ref 37–54)
EGFRCR SERPLBLD CKD-EPI 2021: 102.6 ML/MIN/1.73
EOSINOPHIL # BLD AUTO: 0.15 10*3/MM3 (ref 0–0.4)
EOSINOPHIL NFR BLD AUTO: 2.1 % (ref 0.3–6.2)
ERYTHROCYTE [DISTWIDTH] IN BLOOD BY AUTOMATED COUNT: 15.9 % (ref 12.3–15.4)
GLUCOSE BLDC GLUCOMTR-MCNC: 116 MG/DL (ref 70–99)
GLUCOSE BLDC GLUCOMTR-MCNC: 120 MG/DL (ref 70–99)
GLUCOSE BLDC GLUCOMTR-MCNC: 126 MG/DL (ref 70–99)
GLUCOSE SERPL-MCNC: 118 MG/DL (ref 65–99)
HCT VFR BLD AUTO: 27.7 % (ref 37.5–51)
HGB BLD-MCNC: 8.9 G/DL (ref 13–17.7)
IMM GRANULOCYTES # BLD AUTO: 0.05 10*3/MM3 (ref 0–0.05)
IMM GRANULOCYTES NFR BLD AUTO: 0.7 % (ref 0–0.5)
LYMPHOCYTES # BLD AUTO: 0.83 10*3/MM3 (ref 0.7–3.1)
LYMPHOCYTES NFR BLD AUTO: 11.5 % (ref 19.6–45.3)
MAGNESIUM SERPL-MCNC: 2 MG/DL (ref 1.6–2.6)
MCH RBC QN AUTO: 29.2 PG (ref 26.6–33)
MCHC RBC AUTO-ENTMCNC: 32.1 G/DL (ref 31.5–35.7)
MCV RBC AUTO: 90.8 FL (ref 79–97)
MONOCYTES # BLD AUTO: 0.61 10*3/MM3 (ref 0.1–0.9)
MONOCYTES NFR BLD AUTO: 8.4 % (ref 5–12)
NEUTROPHILS NFR BLD AUTO: 5.54 10*3/MM3 (ref 1.7–7)
NEUTROPHILS NFR BLD AUTO: 76.7 % (ref 42.7–76)
NRBC BLD AUTO-RTO: 0 /100 WBC (ref 0–0.2)
PHOSPHATE SERPL-MCNC: 2.2 MG/DL (ref 2.5–4.5)
PLATELET # BLD AUTO: 71 10*3/MM3 (ref 140–450)
PMV BLD AUTO: 11 FL (ref 6–12)
POTASSIUM SERPL-SCNC: 3.4 MMOL/L (ref 3.5–5.2)
PROT SERPL-MCNC: 5.1 G/DL (ref 6–8.5)
RBC # BLD AUTO: 3.05 10*6/MM3 (ref 4.14–5.8)
SODIUM SERPL-SCNC: 141 MMOL/L (ref 136–145)
WBC NRBC COR # BLD AUTO: 7.22 10*3/MM3 (ref 3.4–10.8)

## 2024-07-10 PROCEDURE — 97165 OT EVAL LOW COMPLEX 30 MIN: CPT

## 2024-07-10 PROCEDURE — 63710000001 INSULIN GLARGINE PER 5 UNITS: Performed by: FAMILY MEDICINE

## 2024-07-10 PROCEDURE — 83735 ASSAY OF MAGNESIUM: CPT | Performed by: FAMILY MEDICINE

## 2024-07-10 PROCEDURE — 25010000002 PROCHLORPERAZINE 10 MG/2ML SOLUTION: Performed by: STUDENT IN AN ORGANIZED HEALTH CARE EDUCATION/TRAINING PROGRAM

## 2024-07-10 PROCEDURE — 97161 PT EVAL LOW COMPLEX 20 MIN: CPT

## 2024-07-10 PROCEDURE — 82948 REAGENT STRIP/BLOOD GLUCOSE: CPT

## 2024-07-10 PROCEDURE — 84100 ASSAY OF PHOSPHORUS: CPT | Performed by: PHYSICIAN ASSISTANT

## 2024-07-10 PROCEDURE — 85025 COMPLETE CBC W/AUTO DIFF WBC: CPT | Performed by: FAMILY MEDICINE

## 2024-07-10 PROCEDURE — 25010000002 HEPARIN (PORCINE) PER 1000 UNITS: Performed by: INTERNAL MEDICINE

## 2024-07-10 PROCEDURE — 82948 REAGENT STRIP/BLOOD GLUCOSE: CPT | Performed by: INTERNAL MEDICINE

## 2024-07-10 PROCEDURE — 25010000002 LEVETIRACETAM IN NACL 0.82% 500 MG/100ML SOLUTION: Performed by: INTERNAL MEDICINE

## 2024-07-10 PROCEDURE — 80076 HEPATIC FUNCTION PANEL: CPT | Performed by: FAMILY MEDICINE

## 2024-07-10 PROCEDURE — 99239 HOSP IP/OBS DSCHRG MGMT >30: CPT | Performed by: FAMILY MEDICINE

## 2024-07-10 PROCEDURE — 82140 ASSAY OF AMMONIA: CPT | Performed by: PHYSICIAN ASSISTANT

## 2024-07-10 PROCEDURE — 80048 BASIC METABOLIC PNL TOTAL CA: CPT | Performed by: PHYSICIAN ASSISTANT

## 2024-07-10 RX ORDER — POTASSIUM CHLORIDE 750 MG/1
30 CAPSULE, EXTENDED RELEASE ORAL ONCE
Status: COMPLETED | OUTPATIENT
Start: 2024-07-10 | End: 2024-07-10

## 2024-07-10 RX ORDER — LACTULOSE 10 G/15ML
30 SOLUTION ORAL 4 TIMES DAILY
Qty: 5400 ML | Refills: 0 | Status: SHIPPED | OUTPATIENT
Start: 2024-07-10 | End: 2024-08-09

## 2024-07-10 RX ORDER — OXYCODONE HYDROCHLORIDE 10 MG/1
10 TABLET ORAL EVERY 8 HOURS PRN
Qty: 6 TABLET | Refills: 0 | Status: SHIPPED | OUTPATIENT
Start: 2024-07-10 | End: 2024-07-12

## 2024-07-10 RX ORDER — BUSPIRONE HYDROCHLORIDE 7.5 MG/1
7.5 TABLET ORAL 3 TIMES DAILY
Qty: 90 TABLET | Refills: 5 | Status: SHIPPED | OUTPATIENT
Start: 2024-07-10

## 2024-07-10 RX ORDER — CEFDINIR 300 MG/1
300 CAPSULE ORAL 2 TIMES DAILY
Qty: 14 CAPSULE | Refills: 0 | Status: SHIPPED | OUTPATIENT
Start: 2024-07-10 | End: 2024-07-17

## 2024-07-10 RX ADMIN — SUCRALFATE 1 G: 1 TABLET ORAL at 08:03

## 2024-07-10 RX ADMIN — Medication 10 ML: at 08:05

## 2024-07-10 RX ADMIN — CARVEDILOL 6.25 MG: 6.25 TABLET, FILM COATED ORAL at 08:03

## 2024-07-10 RX ADMIN — PROCHLORPERAZINE EDISYLATE 5 MG: 5 INJECTION INTRAMUSCULAR; INTRAVENOUS at 06:23

## 2024-07-10 RX ADMIN — LACTULOSE 20 G: 10 SOLUTION ORAL at 08:03

## 2024-07-10 RX ADMIN — PANTOPRAZOLE SODIUM 40 MG: 40 TABLET, DELAYED RELEASE ORAL at 08:03

## 2024-07-10 RX ADMIN — OXYCODONE HYDROCHLORIDE 5 MG: 5 TABLET ORAL at 09:38

## 2024-07-10 RX ADMIN — HEPARIN SODIUM 5000 UNITS: 5000 INJECTION INTRAVENOUS; SUBCUTANEOUS at 08:04

## 2024-07-10 RX ADMIN — HYDROCORTISONE 1 APPLICATION: 1 OINTMENT TOPICAL at 03:15

## 2024-07-10 RX ADMIN — OXYCODONE 10 MG: 5 TABLET ORAL at 01:36

## 2024-07-10 RX ADMIN — RIFAXIMIN 550 MG: 550 TABLET ORAL at 08:03

## 2024-07-10 RX ADMIN — LEVETIRACETAM 500 MG: 5 INJECTION INTRAVENOUS at 08:06

## 2024-07-10 RX ADMIN — BUSPIRONE HYDROCHLORIDE 7.5 MG: 7.5 TABLET ORAL at 08:03

## 2024-07-10 RX ADMIN — INSULIN GLARGINE 10 UNITS: 100 INJECTION, SOLUTION SUBCUTANEOUS at 09:38

## 2024-07-10 RX ADMIN — POTASSIUM CHLORIDE 30 MEQ: 750 CAPSULE, EXTENDED RELEASE ORAL at 08:03

## 2024-07-10 NOTE — PLAN OF CARE
Goal Outcome Evaluation:  Plan of Care Reviewed With: patient        Progress: no change (First session for evaluation)  Outcome Evaluation: Patient presents with limitations of balance and endurance/activity tolerance which impede his ability to perform ADL and transfers as prior.  The skills of a therapist will be required to safely and effectively implement treatment plan to restore maximal level of function.      Anticipated Discharge Disposition (OT): sub acute care setting

## 2024-07-10 NOTE — DISCHARGE SUMMARY
[facility]         HOSPITALIST  DISCHARGE SUMMARY    Patient Name: Nic Nicolas  : 1965  MRN: [id number]    Date of Admission: 2024  Date of Discharge:  7/10/2024    Primary Care Physician: Sheldon Carcamo MD    Consults       Date and Time Order Name Status Description    2024  8:36 PM Inpatient Hospitalist Consult              Active and Resolved Hospital Problems:    Encephalopathy hepatic versus toxic drug-induced  Ruiz cirrhosis  Type 2 diabetes on insulin  Obstructive sleep apnea   TBI  asthma COPD  Anxiety depression  GERD  Chronic opioid use    Active Hospital Problems   No active problems to display.      Resolved Hospital Problems    Diagnosis POA    **AMS (altered mental status) [R41.82] Yes       Hospital Course     Hospital Course:   59-year-old male with history of Ruiz cirrhosis, diabetes, DVT, hypertension, obstructive sleep apnea, TBI, COPD, asthma, depression, anxiety, hospitalized on 2024 for chief complaint of altered mental status.  Concern for metabolic encephalopathy due to drug use versus hepatic encephalopathy due to elevated ammonia level, with low-grade fever being monitored, no further fever episodes, normalized white count, mentation rapidly and unexpectedly improved with correction of serum ammonia level.  Patient requesting disposition back to facility of residence on 7/10/2024, discharged back to living facility residence with instructions to reduce frequency of oxycodone use in addition to increasing frequency of lactulose use.  High risk for readmission if he becomes altered.          Day of Discharge     Vital Signs:  Temp:  [97.3 °F (36.3 °C)-98.8 °F (37.1 °C)] 97.3 °F (36.3 °C)  Heart Rate:  [69-84] 84  Resp:  [20] 20  BP: (130-151)/(41-68) 136/66  Review of systems:  All systems reviewed negative except for weakness, fatigue, chronic back pain    Physical Exam             Constitutional: No acute distress, alert and oriented x  3  Respiratory diminished  Cardiovascular RRR  GI: Abdomen obese soft nontender      Discharge Details        Discharge Medications        New Medications        Instructions Start Date   cefdinir 300 MG capsule  Commonly known as: OMNICEF   300 mg, Oral, 2 Times Daily             Changes to Medications        Instructions Start Date   busPIRone 7.5 MG tablet  Commonly known as: BUSPAR  What changed: Another medication with the same name was removed. Continue taking this medication, and follow the directions you see here.   7.5 mg, Oral, 3 Times Daily      naloxone 4 MG/0.1ML nasal spray  Commonly known as: NARCAN  What changed:   how much to take  how to take this  when to take this  reasons to take this   CALL 911. Do not prime. Spray into nostril upon signs of opioid overdose. May repeat in 2 to 3 minutes in opposite nostril if no or minimal breathing and responsiveness, then as needed (if doses are available) every 2 to 3 minutes.      oxyCODONE 10 MG tablet  Commonly known as: ROXICODONE  What changed: when to take this   10 mg, Oral, Every 8 Hours PRN             Continue These Medications        Instructions Start Date   acetaminophen 325 MG chewable tablet  Commonly known as: TYLENOL   650 mg, Oral, Every 6 Hours PRN      carvedilol 6.25 MG tablet  Commonly known as: COREG   6.25 mg, Oral, 2 Times Daily With Meals, Hold for systolic BP less than 100 mmHg or heart rate less than 55 bpm.      Diclofenac Sodium 1 % gel gel  Commonly known as: VOLTAREN   4 g, Topical, 4 Times Daily      Flovent  MCG/ACT inhaler  Generic drug: fluticasone   1 puff, Inhalation, 2 Times Daily - RT      fluticasone 50 MCG/ACT nasal spray  Commonly known as: FLONASE   1 spray, Nasal, Daily      FT Dry Eye Relief 1 % solution ophthalmic solution  Generic drug: polyethylene Glycol 400   2 drops, Both Eyes, 3 Times Daily PRN      furosemide 40 MG tablet  Commonly known as: LASIX   TAKE 1 TABLET BY MOUTH 2 (TWO) TIMES A DAY.       HumaLOG KwikPen 100 UNIT/ML solution pen-injector  Generic drug: Insulin Lispro (1 Unit Dial)   15 Units, Subcutaneous, 3 Times Daily Before Meals      ipratropium-albuterol 0.5-2.5 mg/3 ml nebulizer  Commonly known as: DUO-NEB   3 mL, Nebulization, Every 4 Hours PRN      lactulose 10 GM/15ML solution  Commonly known as: CHRONULAC   30 g, Oral, 4 Times Daily      Levemir FlexPen 100 UNIT/ML injection  Generic drug: insulin detemir   10 Units, Subcutaneous, Daily      levETIRAcetam 500 MG tablet  Commonly known as: Keppra   500 mg, Oral, 2 Times Daily      magnesium oxide 400 MG tablet  Commonly known as: MAG-OX   400 mg, Oral, Daily      NON FORMULARY   1 dose, Apply externally, 2 Times Daily, Barrier Cream:  Nystatin Ointment 100,000 units/gram Hydrocortisone 1 % Zinc Oxide 20% Bacitracin 500 units/gram      pantoprazole 40 MG EC tablet  Commonly known as: PROTONIX   40 mg, Oral, Daily      riFAXIMin 550 MG tablet  Commonly known as: XIFAXAN   550 mg, Oral, Every 12 Hours Scheduled      rOPINIRole 1 MG tablet  Commonly known as: REQUIP   1 mg, Oral, Nightly, take 1 hour before bedtime      sertraline 100 MG tablet  Commonly known as: ZOLOFT   100 mg, Oral, Nightly      simethicone 80 MG chewable tablet  Commonly known as: MYLICON   80 mg, Oral, 3 Times Daily Before Meals      spironolactone 100 MG tablet  Commonly known as: Aldactone   100 mg, Oral, 2 Times Daily      sucralfate 1 GM/10ML suspension  Commonly known as: CARAFATE   1 g, Oral, 2 Times Daily      Vitamin D3 50 MCG (2000 UT) capsule   2,000 Units, Oral, Daily               No Known Allergies    Discharge Disposition:  Skilled Nursing Facility (DC - External)    Diet:  Hospital:  Diet Order   Procedures    Diet: Regular/House, Diabetic; Consistent Carbohydrate; Texture: Mechanical Ground (NDD 2); Fluid Consistency: Thin (IDDSI 0)       Discharge Activity: as tolerates      CODE STATUS:  Code Status and Medical Interventions:   Ordered at: 07/08/24 4201      Level Of Support Discussed With:    Patient     Code Status (Patient has no pulse and is not breathing):    CPR (Attempt to Resuscitate)     Medical Interventions (Patient has pulse or is breathing):    Full Support         Future Appointments   Date Time Provider Department Center   8/6/2024  2:00 PM Karlos Roblero MD [facility] GE ETW [facility]   9/12/2024  2:15 PM Vahid Leggett MD [facility] ENT ETWN [facility]       Additional Instructions for the Follow-ups that You Need to Schedule       Discharge Follow-up with PCP   As directed       Currently Documented PCP:    Sheldon Carcamo MD    PCP Phone Number:    [phone]     Follow Up Details: 3 to 7 days                Pertinent  and/or Most Recent Results     PROCEDURES:   US Abdomen Complete    Result Date: 7/9/2024  US ABDOMEN COMPLETE Date of Exam: 7/9/2024 6:53 PM EDT Indication: Assess for ascites. Comparison: CT abdomen and pelvis 5/25/2024 Technique: Routine gray scale and color Doppler imaging of the complete abdomen was performed according to routine protocol. Findings: Small amount of free fluid is noted in the right lower quadrant. Otherwise no significant ascites is noted on this study.     Impression: Small amount of free fluid/ascites is noted in the right lower quadrant. No other significant ascites is identified on this study. Electronically Signed: Wan Luque DO  7/9/2024 8:53 PM EDT  Workstation ID: [id number]    XR Chest 1 View    Result Date: 7/8/2024  XR CHEST 1 VW Date of Exam: 7/8/2024 5:55 PM EDT Indication: Cough, persistent Cough cough Comparison: None available. Findings: There are no airspace consolidations. No pleural fluid. No pneumothorax. The pulmonary vasculature appears within normal limits. The cardiac and mediastinal silhouette appear unremarkable. No acute osseous abnormality identified.     Impression: No definite evidence of acute pulmonary process Electronically Signed: Himanshu Barcenas MD  7/8/2024 6:40 PM EDT   Workstation ID: [id number]    CT Head Without Contrast    Result Date: 7/8/2024  CT HEAD WO CONTRAST Date of Exam: 7/8/2024 5:34 PM EDT Indication: Headache headache. Comparison: 5/25/2024 Technique: Axial CT images were obtained of the head without contrast administration.  Reconstructed coronal and sagittal images were also obtained. Automated exposure control and iterative construction methods were used. Findings: No large territory infarct. There is no evidence of hemorrhage. No mass effect, edema or midline shift Unremarkable white matter No extra-axial fluid collection. The ventricles are normal in size and configuration. The visualized orbits are unremarkable. The visualized paranasal sinuses and mastoid air cells are clear. The visualized soft tissues are unremarkable. No acute osseous abnormality.     Impression: No acute intracranial abnormality. Electronically Signed: Wan Luque DO  7/8/2024 5:50 PM EDT  Workstation ID: [id number]       LAB RESULTS:      Lab 07/10/24  0518 07/09/24  0756 07/08/24  2137 07/08/24  1731   WBC 7.22 9.86  --  9.75   HEMOGLOBIN 8.9* 9.1*  --  9.8*   HEMATOCRIT 27.7* 28.4*  --  30.0*   PLATELETS 71* 63*  --  71*   NEUTROS ABS 5.54 8.51*  --  8.57*   IMMATURE GRANS (ABS) 0.05 0.06*  --  0.04   LYMPHS ABS 0.83 0.58*  --  0.37*   MONOS ABS 0.61 0.63  --  0.66   EOS ABS 0.15 0.05  --  0.07   MCV 90.8 90.4  --  90.6   PROCALCITONIN  --  0.38*  --  0.16   PROTIME  --   --  22.1*  --          Lab 07/10/24  0518 07/09/24  0756 07/08/24  1731   SODIUM 141 139 141   POTASSIUM 3.4* 3.3* 4.2   CHLORIDE 108* 106 105   CO2 23.6 23.9 27.3   ANION GAP 9.4 9.1 8.7   BUN 21* 19 19   CREATININE 0.80 0.82 0.91   EGFR 102.6 101.8 97.7   GLUCOSE 118* 147* 125*   CALCIUM 7.8* 8.0* 8.5*   MAGNESIUM 2.0 1.5*  --    PHOSPHORUS 2.2*  --   --          Lab 07/10/24  0518 07/09/24  0756 07/08/24  1731   TOTAL PROTEIN 5.1* 5.3* 5.9*   ALBUMIN 2.7* 2.6* 3.1*   GLOBULIN  --  2.7 2.8   ALT (SGPT) 16 14 20    AST (SGOT) 44* 37 46*   BILIRUBIN 1.1 2.3* 1.7*   INDIRECT BILIRUBIN 0.6  --   --    BILIRUBIN DIRECT 0.5*  --   --    ALK PHOS 97 97 109         Lab 07/08/24 2137 07/08/24  1731   HSTROP T  --  28*   PROTIME 22.1*  --    INR 1.89*  --                  Lab 07/08/24  1814   PH, ARTERIAL 7.555*   PCO2, ARTERIAL 29.4*   PO2 ART 75.5*   O2 SATURATION ART 96.9   FIO2 21   HCO3 ART 26.0   BASE EXCESS ART 4.0*     Brief Urine Lab Results  (Last result in the past 365 days)        Color   Clarity   Blood   Leuk Est   Nitrite   Protein   CREAT   Urine HCG        07/08/24 1840 Yellow   Clear   Negative   Negative   Negative   Negative                 Microbiology Results (last 10 days)       Procedure Component Value - Date/Time    Blood Culture - Blood, Arm, Left [[id number]]  (Normal) Collected: 07/08/24 2137    Lab Status: Preliminary result Specimen: Blood from Arm, Left Updated: 07/09/24 2145     Blood Culture No growth at 24 hours    Blood Culture - Blood, Arm, Right [[id number]]  (Normal) Collected: 07/08/24 2136    Lab Status: Preliminary result Specimen: Blood from Arm, Right Updated: 07/09/24 2145     Blood Culture No growth at 24 hours    COVID-19, FLU A/B, RSV PCR 1 HR TAT - Swab, Nasopharynx [[id number]]  (Normal) Collected: 07/08/24 2123    Lab Status: Final result Specimen: Swab from Nasopharynx Updated: 07/08/24 2226     COVID19 Not Detected     Influenza A PCR Not Detected     Influenza B PCR Not Detected     RSV, PCR Not Detected    Narrative:      Fact sheet for providers: https://www.fda.gov/media/296441/download    Fact sheet for patients: https://www.fda.gov/media/138744/download    Test performed by PCR.            US Abdomen Complete    Result Date: 7/9/2024  Impression: Impression: Small amount of free fluid/ascites is noted in the right lower quadrant. No other significant ascites is identified on this study. Electronically Signed: Wan Luque DO  7/9/2024 8:53 PM EDT  Workstation ID:  [id number]    XR Chest 1 View    Result Date: 7/8/2024  Impression: Impression: No definite evidence of acute pulmonary process Electronically Signed: Himanshu Barcenas MD  7/8/2024 6:40 PM EDT  Workstation ID: [id number]    CT Head Without Contrast    Result Date: 7/8/2024  Impression: Impression: No acute intracranial abnormality. Electronically Signed: Wan Luque DO  7/8/2024 5:50 PM EDT  Workstation ID: [id number]     Results for orders placed during the hospital encounter of 02/24/22    Duplex Carotid Ultrasound CAR    Interpretation Summary  · No significant stenosis is present in carotid bifurcations bilaterally.  · There are right mid internal carotid artery velocity elevations that would meet criteria for mild to moderate stenosis, however, this appears to be related to tortuosity in this region.  · No significant plaque in the bifurcations bilaterally.  · Vertebral flow is antegrade bilaterally.      Results for orders placed during the hospital encounter of 02/24/22    Duplex Carotid Ultrasound CAR    Interpretation Summary  · No significant stenosis is present in carotid bifurcations bilaterally.  · There are right mid internal carotid artery velocity elevations that would meet criteria for mild to moderate stenosis, however, this appears to be related to tortuosity in this region.  · No significant plaque in the bifurcations bilaterally.  · Vertebral flow is antegrade bilaterally.      Results for orders placed during the hospital encounter of 05/13/24    Adult Transthoracic Echo Complete W/ Cont if Necessary Per Protocol    Interpretation Summary    Left ventricular ejection fraction appears to be 66 - 70%.    Left ventricular wall thickness is consistent with mild concentric hypertrophy.    Left ventricular diastolic function is consistent with (grade I) impaired relaxation.    The left atrial cavity is mildly dilated.    Estimated right ventricular systolic pressure from tricuspid regurgitation is  normal (<35 mmHg).      Labs Pending at Discharge:  Pending Labs       Order Current Status    Blood Culture - Blood, Arm, Left Preliminary result    Blood Culture - Blood, Arm, Right Preliminary result              Time spent on Discharge including face to face service:  33 minutes    Electronically signed by Bandar Burch MD, 07/10/24, 11:21 AM EDT.    Portions of this documentation were transcribed electronically from a voice recognition software.  I confirm all data accurately represents the service(s) I performed at today's visit.

## 2024-07-10 NOTE — PLAN OF CARE
Goal Outcome Evaluation:  Plan of Care Reviewed With: patient        Progress: no change  Outcome Evaluation: Pt c/o nausea this shift, administered prn compazine per MAR. Pt c/o pain/discomfort this shift, administered pain med per MAR. Wound consult placed as pt's skin integrity worsened compared to when he was admitted, topical agent applied per MAR. Monitoring blood glucose as ordered. Pt is A&O x4 this shift. Pt is hoping to go back to [facility] today. No new issues or new needs noted at this time.

## 2024-07-10 NOTE — PLAN OF CARE
Goal Outcome Evaluation:  Plan of Care Reviewed With: (P) patient        Progress: (P) no change  Outcome Evaluation: (P) Pt independent with all functional mobility. Pt experienced some pain in L hip. Skilled therapy not required while in hospital.      Anticipated Discharge Disposition (PT): (P) home, extended care facility ([facility] [place]
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
## 2024-07-10 NOTE — CONSULTS
Discharge Planning Assessment   Erin     Patient Name: Nic Nicolas  MRN: [id number]  Today's Date: 7/10/2024    Admit Date: 7/8/2024    Plan: Pt is a LTC resident at [facility]. Pt has 5 day bedhold at facility- MD notified. SW attempted to contact pt's sister to complete assessment but received no answer. Pt currently with confusion. Will attempt to follow up at another time.     Discharge Plan       Row Name 07/10/24 1031       Plan    Patient/Family in Agreement with Plan yes    Final Discharge Disposition Code 64 - nursing facility under Medicaid    Final Note Pt returning to [facility] today. Facility aware.             Expected Discharge Date and Time       Expected Discharge Date Expected Discharge Time    Jul 10, 2024    JESSENIA Gomez
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
## 2024-07-13 LAB
BACTERIA SPEC AEROBE CULT: NORMAL
BACTERIA SPEC AEROBE CULT: NORMAL

## 2024-07-26 ENCOUNTER — TRANSCRIBE ORDERS (OUTPATIENT)
Dept: ADMINISTRATIVE | Facility: HOSPITAL | Age: 59
End: 2024-07-26
Payer: MEDICAID

## 2024-07-26 DIAGNOSIS — K76.6 PORTAL HYPERTENSION: Primary | ICD-10-CM

## 2024-08-05 ENCOUNTER — APPOINTMENT (OUTPATIENT)
Dept: GENERAL RADIOLOGY | Facility: HOSPITAL | Age: 59
End: 2024-08-05
Payer: MEDICAID

## 2024-08-05 ENCOUNTER — HOSPITAL ENCOUNTER (OUTPATIENT)
Dept: INTERVENTIONAL RADIOLOGY/VASCULAR | Facility: HOSPITAL | Age: 59
Discharge: HOME | End: 2024-08-05
Payer: MEDICAID

## 2024-08-05 ENCOUNTER — HOSPITAL ENCOUNTER (EMERGENCY)
Facility: HOSPITAL | Age: 59
Discharge: NURSING, REHAB OR LONG-TERM CARE | End: 2024-08-05
Attending: EMERGENCY MEDICINE | Admitting: EMERGENCY MEDICINE
Payer: MEDICAID

## 2024-08-05 ENCOUNTER — LAB (OUTPATIENT)
Dept: LAB | Facility: HOSPITAL | Age: 59
End: 2024-08-05
Payer: MEDICAID

## 2024-08-05 VITALS
OXYGEN SATURATION: 98 % | BODY MASS INDEX: 42.73 KG/M2 | RESPIRATION RATE: 17 BRPM | DIASTOLIC BLOOD PRESSURE: 93 MMHG | WEIGHT: 281.97 LBS | TEMPERATURE: 97.5 F | HEART RATE: 81 BPM | HEIGHT: 68 IN | SYSTOLIC BLOOD PRESSURE: 116 MMHG

## 2024-08-05 VITALS
OXYGEN SATURATION: 99 % | RESPIRATION RATE: 16 BRPM | DIASTOLIC BLOOD PRESSURE: 75 MMHG | SYSTOLIC BLOOD PRESSURE: 154 MMHG | HEART RATE: 72 BPM

## 2024-08-05 DIAGNOSIS — K76.6 PORTAL HYPERTENSION: ICD-10-CM

## 2024-08-05 DIAGNOSIS — M25.552 PAIN OF LEFT HIP: Primary | ICD-10-CM

## 2024-08-05 DIAGNOSIS — D64.9 ANEMIA, UNSPECIFIED TYPE: ICD-10-CM
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
## 2024-08-05 LAB
ALBUMIN SERPL-MCNC: 3.3 G/DL (ref 3.5–5.2)
ALBUMIN/GLOB SERPL: 1 G/DL
ALP SERPL-CCNC: 140 U/L (ref 39–117)
ALT SERPL W P-5'-P-CCNC: 22 U/L (ref 1–41)
AMMONIA BLD-SCNC: 61 UMOL/L (ref 16–60)
ANION GAP SERPL CALCULATED.3IONS-SCNC: 9 MMOL/L (ref 5–15)
APTT PPP: 27.5 SECONDS (ref 24.2–34.2)
AST SERPL-CCNC: 50 U/L (ref 1–40)
BASOPHILS # BLD AUTO: 0.03 10*3/MM3 (ref 0–0.2)
BASOPHILS NFR BLD AUTO: 0.8 % (ref 0–1.5)
BILIRUB SERPL-MCNC: 1.4 MG/DL (ref 0–1.2)
BILIRUB UR QL STRIP: NEGATIVE
BUN SERPL-MCNC: 21 MG/DL (ref 6–20)
BUN/CREAT SERPL: 24.1 (ref 7–25)
CALCIUM SPEC-SCNC: 8.7 MG/DL (ref 8.6–10.5)
CHLORIDE SERPL-SCNC: 103 MMOL/L (ref 98–107)
CLARITY UR: CLEAR
CO2 SERPL-SCNC: 29 MMOL/L (ref 22–29)
COLOR UR: YELLOW
CREAT SERPL-MCNC: 0.87 MG/DL (ref 0.76–1.27)
D-LACTATE SERPL-SCNC: 1.2 MMOL/L (ref 0.5–2)
DEPRECATED RDW RBC AUTO: 51.2 FL (ref 37–54)
DEPRECATED RDW RBC AUTO: 51.3 FL (ref 37–54)
EGFRCR SERPLBLD CKD-EPI 2021: 100 ML/MIN/1.73
EOSINOPHIL # BLD AUTO: 0.11 10*3/MM3 (ref 0–0.4)
EOSINOPHIL NFR BLD AUTO: 2.8 % (ref 0.3–6.2)
ERYTHROCYTE [DISTWIDTH] IN BLOOD BY AUTOMATED COUNT: 15.4 % (ref 12.3–15.4)
ERYTHROCYTE [DISTWIDTH] IN BLOOD BY AUTOMATED COUNT: 15.5 % (ref 12.3–15.4)
GLOBULIN UR ELPH-MCNC: 3.3 GM/DL
GLUCOSE SERPL-MCNC: 172 MG/DL (ref 65–99)
GLUCOSE UR STRIP-MCNC: NEGATIVE MG/DL
HCT VFR BLD AUTO: 27.5 % (ref 37.5–51)
HCT VFR BLD AUTO: 31.4 % (ref 37.5–51)
HGB BLD-MCNC: 8.6 G/DL (ref 13–17.7)
HGB BLD-MCNC: 9.8 G/DL (ref 13–17.7)
HGB UR QL STRIP.AUTO: NEGATIVE
HOLD SPECIMEN: NORMAL
HOLD SPECIMEN: NORMAL
IMM GRANULOCYTES # BLD AUTO: 0.02 10*3/MM3 (ref 0–0.05)
IMM GRANULOCYTES NFR BLD AUTO: 0.5 % (ref 0–0.5)
INR PPP: 1.72 (ref 0.86–1.15)
INR PPP: 1.78 (ref 0.86–1.15)
KETONES UR QL STRIP: NEGATIVE
LEUKOCYTE ESTERASE UR QL STRIP.AUTO: NEGATIVE
LIPASE SERPL-CCNC: 33 U/L (ref 13–60)
LYMPHOCYTES # BLD AUTO: 0.65 10*3/MM3 (ref 0.7–3.1)
LYMPHOCYTES NFR BLD AUTO: 16.7 % (ref 19.6–45.3)
MCH RBC QN AUTO: 28.2 PG (ref 26.6–33)
MCH RBC QN AUTO: 28.3 PG (ref 26.6–33)
MCHC RBC AUTO-ENTMCNC: 31.2 G/DL (ref 31.5–35.7)
MCHC RBC AUTO-ENTMCNC: 31.3 G/DL (ref 31.5–35.7)
MCV RBC AUTO: 90.2 FL (ref 79–97)
MCV RBC AUTO: 90.8 FL (ref 79–97)
MONOCYTES # BLD AUTO: 0.36 10*3/MM3 (ref 0.1–0.9)
MONOCYTES NFR BLD AUTO: 9.3 % (ref 5–12)
NEUTROPHILS NFR BLD AUTO: 2.72 10*3/MM3 (ref 1.7–7)
NEUTROPHILS NFR BLD AUTO: 69.9 % (ref 42.7–76)
NITRITE UR QL STRIP: NEGATIVE
NRBC BLD AUTO-RTO: 0 /100 WBC (ref 0–0.2)
PH UR STRIP.AUTO: 6.5 [PH] (ref 5–8)
PLATELET # BLD AUTO: 64 10*3/MM3 (ref 140–450)
PLATELET # BLD AUTO: 77 10*3/MM3 (ref 140–450)
PMV BLD AUTO: 11.9 FL (ref 6–12)
PMV BLD AUTO: 12.5 FL (ref 6–12)
POTASSIUM SERPL-SCNC: 3.3 MMOL/L (ref 3.5–5.2)
PROT SERPL-MCNC: 6.6 G/DL (ref 6–8.5)
PROT UR QL STRIP: NEGATIVE
PROTHROMBIN TIME: 20.5 SECONDS (ref 11.8–14.9)
PROTHROMBIN TIME: 21 SECONDS (ref 11.8–14.9)
RBC # BLD AUTO: 3.05 10*6/MM3 (ref 4.14–5.8)
RBC # BLD AUTO: 3.46 10*6/MM3 (ref 4.14–5.8)
SODIUM SERPL-SCNC: 141 MMOL/L (ref 136–145)
SP GR UR STRIP: 1.01 (ref 1–1.03)
UROBILINOGEN UR QL STRIP: NORMAL
WBC NRBC COR # BLD AUTO: 3.57 10*3/MM3 (ref 3.4–10.8)
WBC NRBC COR # BLD AUTO: 3.89 10*3/MM3 (ref 3.4–10.8)
WHOLE BLOOD HOLD COAG: NORMAL
WHOLE BLOOD HOLD SPECIMEN: NORMAL

## 2024-08-05 PROCEDURE — 73502 X-RAY EXAM HIP UNI 2-3 VIEWS: CPT

## 2024-08-05 PROCEDURE — 85027 COMPLETE CBC AUTOMATED: CPT

## 2024-08-05 PROCEDURE — 85730 THROMBOPLASTIN TIME PARTIAL: CPT | Performed by: INTERNAL MEDICINE

## 2024-08-05 PROCEDURE — 82140 ASSAY OF AMMONIA: CPT

## 2024-08-05 PROCEDURE — 83605 ASSAY OF LACTIC ACID: CPT | Performed by: EMERGENCY MEDICINE

## 2024-08-05 PROCEDURE — 85025 COMPLETE CBC W/AUTO DIFF WBC: CPT | Performed by: EMERGENCY MEDICINE

## 2024-08-05 PROCEDURE — 80053 COMPREHEN METABOLIC PANEL: CPT | Performed by: EMERGENCY MEDICINE

## 2024-08-05 PROCEDURE — 36415 COLL VENOUS BLD VENIPUNCTURE: CPT

## 2024-08-05 PROCEDURE — 76942 ECHO GUIDE FOR BIOPSY: CPT

## 2024-08-05 PROCEDURE — 85610 PROTHROMBIN TIME: CPT

## 2024-08-05 PROCEDURE — 85610 PROTHROMBIN TIME: CPT | Performed by: INTERNAL MEDICINE

## 2024-08-05 PROCEDURE — 83690 ASSAY OF LIPASE: CPT | Performed by: EMERGENCY MEDICINE

## 2024-08-05 PROCEDURE — 81003 URINALYSIS AUTO W/O SCOPE: CPT | Performed by: EMERGENCY MEDICINE

## 2024-08-05 PROCEDURE — 99283 EMERGENCY DEPT VISIT LOW MDM: CPT

## 2024-08-05 RX ORDER — OXYCODONE AND ACETAMINOPHEN 10; 325 MG/1; MG/1
1 TABLET ORAL ONCE
Status: COMPLETED | OUTPATIENT
Start: 2024-08-05 | End: 2024-08-05

## 2024-08-05 RX ORDER — OXYCODONE AND ACETAMINOPHEN 10; 325 MG/1; MG/1
1 TABLET ORAL EVERY 8 HOURS PRN
COMMUNITY

## 2024-08-05 RX ORDER — POTASSIUM CHLORIDE 750 MG/1
40 CAPSULE, EXTENDED RELEASE ORAL ONCE
Status: COMPLETED | OUTPATIENT
Start: 2024-08-05 | End: 2024-08-05

## 2024-08-05 RX ORDER — SODIUM CHLORIDE 0.9 % (FLUSH) 0.9 %
10 SYRINGE (ML) INJECTION AS NEEDED
Status: DISCONTINUED | OUTPATIENT
Start: 2024-08-05 | End: 2024-08-05 | Stop reason: HOSPADM

## 2024-08-05 RX ADMIN — POTASSIUM CHLORIDE 40 MEQ: 750 CAPSULE, EXTENDED RELEASE ORAL at 14:00

## 2024-08-05 RX ADMIN — OXYCODONE AND ACETAMINOPHEN 1 TABLET: 10; 325 TABLET ORAL at 14:00

## 2024-08-05 NOTE — DISCHARGE INSTRUCTIONS
Continue taking your medications as prescribed.  Follow-up with your primary care provider.  Return to emergency department for worsening symptoms such as worsening abdominal pain, nausea and vomiting.
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
## 2024-08-06 ENCOUNTER — OFFICE VISIT (OUTPATIENT)
Dept: GASTROENTEROLOGY | Facility: CLINIC | Age: 59
End: 2024-08-06
Payer: MEDICAID

## 2024-08-06 VITALS
WEIGHT: 282 LBS | OXYGEN SATURATION: 99 % | HEART RATE: 79 BPM | SYSTOLIC BLOOD PRESSURE: 137 MMHG | DIASTOLIC BLOOD PRESSURE: 62 MMHG | HEIGHT: 68 IN | BODY MASS INDEX: 42.74 KG/M2

## 2024-08-06 DIAGNOSIS — Z87.11 HISTORY OF BLEEDING ULCERS: ICD-10-CM

## 2024-08-06 DIAGNOSIS — K76.82 HEPATIC ENCEPHALOPATHY: Chronic | ICD-10-CM

## 2024-08-06 DIAGNOSIS — K74.60 LIVER CIRRHOSIS SECONDARY TO NASH (NONALCOHOLIC STEATOHEPATITIS): Primary | Chronic | ICD-10-CM

## 2024-08-06 DIAGNOSIS — K75.81 LIVER CIRRHOSIS SECONDARY TO NASH (NONALCOHOLIC STEATOHEPATITIS): Primary | Chronic | ICD-10-CM

## 2024-08-06 RX ORDER — ONDANSETRON 4 MG/1
4 TABLET, FILM COATED ORAL EVERY 8 HOURS PRN
Qty: 90 TABLET | Refills: 11 | Status: SHIPPED | OUTPATIENT
Start: 2024-08-06

## 2024-08-06 RX ORDER — GABAPENTIN 300 MG/1
300 CAPSULE ORAL 3 TIMES DAILY
COMMUNITY

## 2024-08-06 RX ORDER — ACYCLOVIR 400 MG/1
TABLET ORAL
COMMUNITY
Start: 2024-07-27

## 2024-08-06 RX ORDER — LIDOCAINE HYDROCHLORIDE, MENTHOL 40; 10 MG/G; MG/G
PATCH TOPICAL
COMMUNITY

## 2024-08-06 RX ORDER — FAMOTIDINE 20 MG/1
20 TABLET, FILM COATED ORAL 2 TIMES DAILY
COMMUNITY

## 2024-08-06 RX ORDER — TRAZODONE HYDROCHLORIDE 50 MG/1
50 TABLET ORAL NIGHTLY
COMMUNITY

## 2024-08-06 RX ORDER — BENZOCAINE/MENTHOL 6 MG-10 MG
1 LOZENGE MUCOUS MEMBRANE 2 TIMES DAILY
COMMUNITY
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
## 2024-08-21 ENCOUNTER — TELEPHONE (OUTPATIENT)
Dept: OTOLARYNGOLOGY | Facility: CLINIC | Age: 59
End: 2024-08-21
Payer: MEDICAID

## 2024-08-21 NOTE — TELEPHONE ENCOUNTER
Left message for patient to call our office.    HUB to Read:    We need to inform patient that Dr. Leggett is no longer with [facility].    We want to offer to reschedule with one of our other providers. Please schedule for next available New Patient slot.

## 2024-09-10 ENCOUNTER — OFFICE VISIT (OUTPATIENT)
Dept: ONCOLOGY | Facility: HOSPITAL | Age: 59
End: 2024-09-10
Payer: MEDICAID

## 2024-09-10 ENCOUNTER — LAB (OUTPATIENT)
Dept: ONCOLOGY | Facility: HOSPITAL | Age: 59
End: 2024-09-10
Payer: MEDICAID

## 2024-09-10 VITALS
SYSTOLIC BLOOD PRESSURE: 141 MMHG | WEIGHT: 274.25 LBS | OXYGEN SATURATION: 96 % | RESPIRATION RATE: 18 BRPM | BODY MASS INDEX: 41.57 KG/M2 | TEMPERATURE: 97 F | HEIGHT: 68 IN | DIASTOLIC BLOOD PRESSURE: 54 MMHG | HEART RATE: 72 BPM

## 2024-09-10 DIAGNOSIS — D69.6 THROMBOCYTOPENIA: Primary | ICD-10-CM

## 2024-09-10 DIAGNOSIS — D50.0 IRON DEFICIENCY ANEMIA DUE TO CHRONIC BLOOD LOSS: ICD-10-CM

## 2024-09-10 DIAGNOSIS — D64.9 ANEMIA, UNSPECIFIED TYPE: Primary | ICD-10-CM

## 2024-09-10 LAB
ABO GROUP BLD: NORMAL
ALBUMIN SERPL-MCNC: 3.6 G/DL (ref 3.5–5.2)
ALBUMIN/GLOB SERPL: 1.4 G/DL
ALP SERPL-CCNC: 121 U/L (ref 39–117)
ALT SERPL W P-5'-P-CCNC: 18 U/L (ref 1–41)
ANION GAP SERPL CALCULATED.3IONS-SCNC: 7.6 MMOL/L (ref 5–15)
AST SERPL-CCNC: 35 U/L (ref 1–40)
BASOPHILS # BLD AUTO: 0.03 10*3/MM3 (ref 0–0.2)
BASOPHILS NFR BLD AUTO: 0.7 % (ref 0–1.5)
BILIRUB SERPL-MCNC: 1.4 MG/DL (ref 0–1.2)
BLD GP AB SCN SERPL QL: NEGATIVE
BUN SERPL-MCNC: 41 MG/DL (ref 6–20)
BUN/CREAT SERPL: 30.1 (ref 7–25)
CALCIUM SPEC-SCNC: 8.9 MG/DL (ref 8.6–10.5)
CHLORIDE SERPL-SCNC: 108 MMOL/L (ref 98–107)
CO2 SERPL-SCNC: 27.4 MMOL/L (ref 22–29)
CREAT SERPL-MCNC: 1.36 MG/DL (ref 0.76–1.27)
DEPRECATED RDW RBC AUTO: 48.8 FL (ref 37–54)
EGFRCR SERPLBLD CKD-EPI 2021: 60.3 ML/MIN/1.73
EOSINOPHIL # BLD AUTO: 0.08 10*3/MM3 (ref 0–0.4)
EOSINOPHIL NFR BLD AUTO: 1.8 % (ref 0.3–6.2)
ERYTHROCYTE [DISTWIDTH] IN BLOOD BY AUTOMATED COUNT: 15.9 % (ref 12.3–15.4)
FERRITIN SERPL-MCNC: 32.56 NG/ML (ref 30–400)
GLOBULIN UR ELPH-MCNC: 2.5 GM/DL
GLUCOSE SERPL-MCNC: 180 MG/DL (ref 65–99)
HCT VFR BLD AUTO: 27 % (ref 37.5–51)
HGB BLD-MCNC: 8.2 G/DL (ref 13–17.7)
IMM GRANULOCYTES # BLD AUTO: 0.03 10*3/MM3 (ref 0–0.05)
IMM GRANULOCYTES NFR BLD AUTO: 0.7 % (ref 0–0.5)
IRON 24H UR-MRATE: 35 MCG/DL (ref 59–158)
IRON SATN MFR SERPL: 8 % (ref 20–50)
LYMPHOCYTES # BLD AUTO: 0.63 10*3/MM3 (ref 0.7–3.1)
LYMPHOCYTES NFR BLD AUTO: 14.5 % (ref 19.6–45.3)
MCH RBC QN AUTO: 25.6 PG (ref 26.6–33)
MCHC RBC AUTO-ENTMCNC: 30.4 G/DL (ref 31.5–35.7)
MCV RBC AUTO: 84.4 FL (ref 79–97)
MONOCYTES # BLD AUTO: 0.42 10*3/MM3 (ref 0.1–0.9)
MONOCYTES NFR BLD AUTO: 9.7 % (ref 5–12)
NEUTROPHILS NFR BLD AUTO: 3.14 10*3/MM3 (ref 1.7–7)
NEUTROPHILS NFR BLD AUTO: 72.6 % (ref 42.7–76)
NRBC BLD AUTO-RTO: 0 /100 WBC (ref 0–0.2)
PLATELET # BLD AUTO: 86 10*3/MM3 (ref 140–450)
PMV BLD AUTO: 11 FL (ref 6–12)
POTASSIUM SERPL-SCNC: 4.1 MMOL/L (ref 3.5–5.2)
PROT SERPL-MCNC: 6.1 G/DL (ref 6–8.5)
RBC # BLD AUTO: 3.2 10*6/MM3 (ref 4.14–5.8)
RH BLD: POSITIVE
SODIUM SERPL-SCNC: 143 MMOL/L (ref 136–145)
T&S EXPIRATION DATE: NORMAL
TIBC SERPL-MCNC: 466 MCG/DL (ref 298–536)
TRANSFERRIN SERPL-MCNC: 313 MG/DL (ref 200–360)
WBC NRBC COR # BLD AUTO: 4.33 10*3/MM3 (ref 3.4–10.8)

## 2024-09-10 PROCEDURE — 84466 ASSAY OF TRANSFERRIN: CPT | Performed by: NURSE PRACTITIONER

## 2024-09-10 PROCEDURE — 3078F DIAST BP <80 MM HG: CPT | Performed by: NURSE PRACTITIONER

## 2024-09-10 PROCEDURE — 36415 COLL VENOUS BLD VENIPUNCTURE: CPT | Performed by: NURSE PRACTITIONER

## 2024-09-10 PROCEDURE — 99214 OFFICE O/P EST MOD 30 MIN: CPT | Performed by: NURSE PRACTITIONER

## 2024-09-10 PROCEDURE — 3077F SYST BP >= 140 MM HG: CPT | Performed by: NURSE PRACTITIONER

## 2024-09-10 PROCEDURE — 1160F RVW MEDS BY RX/DR IN RCRD: CPT | Performed by: NURSE PRACTITIONER

## 2024-09-10 PROCEDURE — 1125F AMNT PAIN NOTED PAIN PRSNT: CPT | Performed by: NURSE PRACTITIONER

## 2024-09-10 PROCEDURE — 83540 ASSAY OF IRON: CPT | Performed by: NURSE PRACTITIONER

## 2024-09-10 PROCEDURE — 80053 COMPREHEN METABOLIC PANEL: CPT | Performed by: NURSE PRACTITIONER

## 2024-09-10 PROCEDURE — 1159F MED LIST DOCD IN RCRD: CPT | Performed by: NURSE PRACTITIONER

## 2024-09-10 PROCEDURE — 86900 BLOOD TYPING SEROLOGIC ABO: CPT | Performed by: NURSE PRACTITIONER

## 2024-09-10 PROCEDURE — 86850 RBC ANTIBODY SCREEN: CPT | Performed by: NURSE PRACTITIONER

## 2024-09-10 PROCEDURE — 82728 ASSAY OF FERRITIN: CPT | Performed by: NURSE PRACTITIONER

## 2024-09-10 PROCEDURE — 85025 COMPLETE CBC W/AUTO DIFF WBC: CPT | Performed by: NURSE PRACTITIONER

## 2024-09-10 PROCEDURE — 86901 BLOOD TYPING SEROLOGIC RH(D): CPT | Performed by: NURSE PRACTITIONER

## 2024-09-10 PROCEDURE — G0463 HOSPITAL OUTPT CLINIC VISIT: HCPCS | Performed by: NURSE PRACTITIONER

## 2024-09-10 RX ORDER — CARVEDILOL 12.5 MG/1
TABLET ORAL
COMMUNITY
Start: 2024-08-21

## 2024-09-10 RX ORDER — POTASSIUM CHLORIDE 1500 MG/1
TABLET, EXTENDED RELEASE ORAL
COMMUNITY
Start: 2024-08-15

## 2024-09-10 RX ORDER — HYDROXYZINE HYDROCHLORIDE 25 MG/1
TABLET, FILM COATED ORAL
COMMUNITY
Start: 2024-08-10

## 2024-09-10 RX ORDER — ONDANSETRON 4 MG/1
TABLET, ORALLY DISINTEGRATING ORAL
COMMUNITY
Start: 2024-09-01

## 2024-09-10 RX ORDER — LACTULOSE 10 G/15ML
SOLUTION ORAL
COMMUNITY
Start: 2024-09-06

## 2024-09-10 RX ORDER — RIFAXIMIN 550 MG/1
TABLET ORAL
COMMUNITY
Start: 2024-08-23

## 2024-09-10 RX ORDER — OXYCODONE HYDROCHLORIDE 10 MG/1
TABLET ORAL
COMMUNITY
Start: 2024-09-05

## 2024-09-10 RX ORDER — TRIAMCINOLONE ACETONIDE 1 MG/G
CREAM TOPICAL
COMMUNITY
Start: 2024-08-10
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
## 2024-09-21 ENCOUNTER — APPOINTMENT (OUTPATIENT)
Dept: GENERAL RADIOLOGY | Facility: HOSPITAL | Age: 59
End: 2024-09-21
Payer: MEDICAID

## 2024-09-21 ENCOUNTER — HOSPITAL ENCOUNTER (INPATIENT)
Facility: HOSPITAL | Age: 59
LOS: 2 days | Discharge: NURSING, REHAB OR LONG-TERM CARE | End: 2024-09-23
Attending: EMERGENCY MEDICINE | Admitting: FAMILY MEDICINE
Payer: MEDICAID

## 2024-09-21 DIAGNOSIS — D69.3 CHRONIC IDIOPATHIC THROMBOCYTOPENIA: ICD-10-CM

## 2024-09-21 DIAGNOSIS — N18.9 CHRONIC RENAL IMPAIRMENT, UNSPECIFIED CKD STAGE: ICD-10-CM

## 2024-09-21 DIAGNOSIS — K76.82 HEPATIC ENCEPHALOPATHY: Primary | ICD-10-CM

## 2024-09-21 DIAGNOSIS — K75.81 NASH (NONALCOHOLIC STEATOHEPATITIS): ICD-10-CM

## 2024-09-21 DIAGNOSIS — D64.9 CHRONIC ANEMIA: ICD-10-CM

## 2024-09-21 LAB
ALBUMIN SERPL-MCNC: 3.1 G/DL (ref 3.5–5.2)
ALBUMIN/GLOB SERPL: 1.1 G/DL
ALP SERPL-CCNC: 104 U/L (ref 39–117)
ALT SERPL W P-5'-P-CCNC: 16 U/L (ref 1–41)
AMMONIA BLD-SCNC: 288 UMOL/L (ref 16–60)
ANION GAP SERPL CALCULATED.3IONS-SCNC: 9.7 MMOL/L (ref 5–15)
AST SERPL-CCNC: 31 U/L (ref 1–40)
BASOPHILS # BLD AUTO: 0.02 10*3/MM3 (ref 0–0.2)
BASOPHILS NFR BLD AUTO: 0.7 % (ref 0–1.5)
BILIRUB SERPL-MCNC: 1.1 MG/DL (ref 0–1.2)
BILIRUB UR QL STRIP: NEGATIVE
BUN SERPL-MCNC: 36 MG/DL (ref 6–20)
BUN/CREAT SERPL: 22.8 (ref 7–25)
CALCIUM SPEC-SCNC: 8.8 MG/DL (ref 8.6–10.5)
CHLORIDE SERPL-SCNC: 101 MMOL/L (ref 98–107)
CLARITY UR: CLEAR
CO2 SERPL-SCNC: 27.3 MMOL/L (ref 22–29)
COLOR UR: YELLOW
CREAT SERPL-MCNC: 1.58 MG/DL (ref 0.76–1.27)
D-LACTATE SERPL-SCNC: 2.2 MMOL/L (ref 0.5–2)
DEPRECATED RDW RBC AUTO: 47.5 FL (ref 37–54)
EGFRCR SERPLBLD CKD-EPI 2021: 50.4 ML/MIN/1.73
EOSINOPHIL # BLD AUTO: 0.04 10*3/MM3 (ref 0–0.4)
EOSINOPHIL NFR BLD AUTO: 1.3 % (ref 0.3–6.2)
ERYTHROCYTE [DISTWIDTH] IN BLOOD BY AUTOMATED COUNT: 15.6 % (ref 12.3–15.4)
FLUAV SUBTYP SPEC NAA+PROBE: NOT DETECTED
FLUBV RNA ISLT QL NAA+PROBE: NOT DETECTED
GLOBULIN UR ELPH-MCNC: 2.7 GM/DL
GLUCOSE BLDC GLUCOMTR-MCNC: 153 MG/DL (ref 70–99)
GLUCOSE BLDC GLUCOMTR-MCNC: 176 MG/DL (ref 70–99)
GLUCOSE BLDC GLUCOMTR-MCNC: 176 MG/DL (ref 70–99)
GLUCOSE BLDC GLUCOMTR-MCNC: 213 MG/DL (ref 70–99)
GLUCOSE SERPL-MCNC: 304 MG/DL (ref 65–99)
GLUCOSE UR STRIP-MCNC: NEGATIVE MG/DL
HBA1C MFR BLD: 7.5 % (ref 4.8–5.6)
HCT VFR BLD AUTO: 24 % (ref 37.5–51)
HGB BLD-MCNC: 7.5 G/DL (ref 13–17.7)
HGB UR QL STRIP.AUTO: NEGATIVE
HOLD SPECIMEN: NORMAL
HOLD SPECIMEN: NORMAL
IMM GRANULOCYTES # BLD AUTO: 0.01 10*3/MM3 (ref 0–0.05)
IMM GRANULOCYTES NFR BLD AUTO: 0.3 % (ref 0–0.5)
KETONES UR QL STRIP: NEGATIVE
LEUKOCYTE ESTERASE UR QL STRIP.AUTO: NEGATIVE
LYMPHOCYTES # BLD AUTO: 0.57 10*3/MM3 (ref 0.7–3.1)
LYMPHOCYTES NFR BLD AUTO: 18.9 % (ref 19.6–45.3)
MAGNESIUM SERPL-MCNC: 1.8 MG/DL (ref 1.6–2.6)
MCH RBC QN AUTO: 25.8 PG (ref 26.6–33)
MCHC RBC AUTO-ENTMCNC: 31.3 G/DL (ref 31.5–35.7)
MCV RBC AUTO: 82.5 FL (ref 79–97)
MONOCYTES # BLD AUTO: 0.29 10*3/MM3 (ref 0.1–0.9)
MONOCYTES NFR BLD AUTO: 9.6 % (ref 5–12)
NEUTROPHILS NFR BLD AUTO: 2.08 10*3/MM3 (ref 1.7–7)
NEUTROPHILS NFR BLD AUTO: 69.2 % (ref 42.7–76)
NITRITE UR QL STRIP: NEGATIVE
NRBC BLD AUTO-RTO: 0 /100 WBC (ref 0–0.2)
NT-PROBNP SERPL-MCNC: 163.5 PG/ML (ref 0–900)
PH UR STRIP.AUTO: 7.5 [PH] (ref 5–8)
PLATELET # BLD AUTO: 54 10*3/MM3 (ref 140–450)
PMV BLD AUTO: 11.2 FL (ref 6–12)
POTASSIUM SERPL-SCNC: 4.7 MMOL/L (ref 3.5–5.2)
PROT SERPL-MCNC: 5.8 G/DL (ref 6–8.5)
PROT UR QL STRIP: NEGATIVE
RBC # BLD AUTO: 2.91 10*6/MM3 (ref 4.14–5.8)
RSV RNA NPH QL NAA+NON-PROBE: NOT DETECTED
SARS-COV-2 RNA RESP QL NAA+PROBE: NOT DETECTED
SODIUM SERPL-SCNC: 138 MMOL/L (ref 136–145)
SP GR UR STRIP: 1.01 (ref 1–1.03)
TROPONIN T SERPL HS-MCNC: 34 NG/L
UROBILINOGEN UR QL STRIP: NORMAL
WBC NRBC COR # BLD AUTO: 3.01 10*3/MM3 (ref 3.4–10.8)
WHOLE BLOOD HOLD COAG: NORMAL
WHOLE BLOOD HOLD SPECIMEN: NORMAL

## 2024-09-21 PROCEDURE — 71045 X-RAY EXAM CHEST 1 VIEW: CPT

## 2024-09-21 PROCEDURE — 82140 ASSAY OF AMMONIA: CPT | Performed by: EMERGENCY MEDICINE

## 2024-09-21 PROCEDURE — 36415 COLL VENOUS BLD VENIPUNCTURE: CPT

## 2024-09-21 PROCEDURE — 99291 CRITICAL CARE FIRST HOUR: CPT

## 2024-09-21 PROCEDURE — 80053 COMPREHEN METABOLIC PANEL: CPT | Performed by: EMERGENCY MEDICINE

## 2024-09-21 PROCEDURE — 83880 ASSAY OF NATRIURETIC PEPTIDE: CPT | Performed by: EMERGENCY MEDICINE

## 2024-09-21 PROCEDURE — 82948 REAGENT STRIP/BLOOD GLUCOSE: CPT

## 2024-09-21 PROCEDURE — 83036 HEMOGLOBIN GLYCOSYLATED A1C: CPT | Performed by: FAMILY MEDICINE

## 2024-09-21 PROCEDURE — 87040 BLOOD CULTURE FOR BACTERIA: CPT | Performed by: EMERGENCY MEDICINE

## 2024-09-21 PROCEDURE — 93005 ELECTROCARDIOGRAM TRACING: CPT | Performed by: EMERGENCY MEDICINE

## 2024-09-21 PROCEDURE — 81003 URINALYSIS AUTO W/O SCOPE: CPT | Performed by: EMERGENCY MEDICINE

## 2024-09-21 PROCEDURE — 99223 1ST HOSP IP/OBS HIGH 75: CPT | Performed by: FAMILY MEDICINE

## 2024-09-21 PROCEDURE — 25010000002 FUROSEMIDE PER 20 MG: Performed by: FAMILY MEDICINE

## 2024-09-21 PROCEDURE — 25010000002 ONDANSETRON PER 1 MG: Performed by: FAMILY MEDICINE

## 2024-09-21 PROCEDURE — 25810000003 LACTATED RINGERS PER 1000 ML: Performed by: FAMILY MEDICINE

## 2024-09-21 PROCEDURE — 93005 ELECTROCARDIOGRAM TRACING: CPT

## 2024-09-21 PROCEDURE — 83605 ASSAY OF LACTIC ACID: CPT | Performed by: EMERGENCY MEDICINE

## 2024-09-21 PROCEDURE — 83735 ASSAY OF MAGNESIUM: CPT | Performed by: EMERGENCY MEDICINE

## 2024-09-21 PROCEDURE — 85025 COMPLETE CBC W/AUTO DIFF WBC: CPT

## 2024-09-21 PROCEDURE — 63710000001 INSULIN GLARGINE PER 5 UNITS: Performed by: FAMILY MEDICINE

## 2024-09-21 PROCEDURE — 63710000001 INSULIN LISPRO (HUMAN) PER 5 UNITS: Performed by: FAMILY MEDICINE

## 2024-09-21 PROCEDURE — 80177 DRUG SCRN QUAN LEVETIRACETAM: CPT | Performed by: EMERGENCY MEDICINE

## 2024-09-21 PROCEDURE — 87637 SARSCOV2&INF A&B&RSV AMP PRB: CPT | Performed by: EMERGENCY MEDICINE

## 2024-09-21 PROCEDURE — 94799 UNLISTED PULMONARY SVC/PX: CPT

## 2024-09-21 PROCEDURE — 84484 ASSAY OF TROPONIN QUANT: CPT | Performed by: EMERGENCY MEDICINE

## 2024-09-21 RX ORDER — NICOTINE POLACRILEX 4 MG
15 LOZENGE BUCCAL
Status: DISCONTINUED | OUTPATIENT
Start: 2024-09-21 | End: 2024-09-23 | Stop reason: HOSPADM

## 2024-09-21 RX ORDER — LACTULOSE 10 G/15ML
20 SOLUTION ORAL ONCE
COMMUNITY
End: 2024-09-23 | Stop reason: HOSPADM

## 2024-09-21 RX ORDER — SODIUM CHLORIDE 0.9 % (FLUSH) 0.9 %
10 SYRINGE (ML) INJECTION AS NEEDED
Status: DISCONTINUED | OUTPATIENT
Start: 2024-09-21 | End: 2024-09-23 | Stop reason: HOSPADM

## 2024-09-21 RX ORDER — AMOXICILLIN 250 MG
2 CAPSULE ORAL 2 TIMES DAILY PRN
Status: DISCONTINUED | OUTPATIENT
Start: 2024-09-21 | End: 2024-09-23 | Stop reason: HOSPADM

## 2024-09-21 RX ORDER — ROPINIROLE 1 MG/1
1 TABLET, FILM COATED ORAL NIGHTLY
Status: DISCONTINUED | OUTPATIENT
Start: 2024-09-21 | End: 2024-09-23 | Stop reason: HOSPADM

## 2024-09-21 RX ORDER — LACTULOSE 10 G/15ML
20 SOLUTION ORAL 3 TIMES DAILY
Status: DISCONTINUED | OUTPATIENT
Start: 2024-09-21 | End: 2024-09-21

## 2024-09-21 RX ORDER — SODIUM CHLORIDE 0.9 % (FLUSH) 0.9 %
10 SYRINGE (ML) INJECTION EVERY 12 HOURS SCHEDULED
Status: DISCONTINUED | OUTPATIENT
Start: 2024-09-21 | End: 2024-09-23 | Stop reason: HOSPADM

## 2024-09-21 RX ORDER — IPRATROPIUM BROMIDE AND ALBUTEROL SULFATE 2.5; .5 MG/3ML; MG/3ML
3 SOLUTION RESPIRATORY (INHALATION) EVERY 4 HOURS PRN
Status: DISCONTINUED | OUTPATIENT
Start: 2024-09-21 | End: 2024-09-23 | Stop reason: HOSPADM

## 2024-09-21 RX ORDER — CARVEDILOL 25 MG/1
25 TABLET ORAL 2 TIMES DAILY WITH MEALS
Status: ON HOLD | COMMUNITY
Start: 2024-09-16

## 2024-09-21 RX ORDER — BISACODYL 5 MG/1
5 TABLET, DELAYED RELEASE ORAL DAILY PRN
Status: DISCONTINUED | OUTPATIENT
Start: 2024-09-21 | End: 2024-09-23 | Stop reason: HOSPADM

## 2024-09-21 RX ORDER — INSULIN LISPRO 100 [IU]/ML
2-9 INJECTION, SOLUTION INTRAVENOUS; SUBCUTANEOUS
Status: DISCONTINUED | OUTPATIENT
Start: 2024-09-21 | End: 2024-09-23 | Stop reason: HOSPADM

## 2024-09-21 RX ORDER — HEPARIN SODIUM 5000 [USP'U]/ML
5000 INJECTION, SOLUTION INTRAVENOUS; SUBCUTANEOUS EVERY 12 HOURS SCHEDULED
Status: DISCONTINUED | OUTPATIENT
Start: 2024-09-21 | End: 2024-09-21

## 2024-09-21 RX ORDER — IBUPROFEN 600 MG/1
1 TABLET ORAL
Status: DISCONTINUED | OUTPATIENT
Start: 2024-09-21 | End: 2024-09-23 | Stop reason: HOSPADM

## 2024-09-21 RX ORDER — SUCRALFATE 1 G/1
1 TABLET ORAL 4 TIMES DAILY
Status: ON HOLD | COMMUNITY

## 2024-09-21 RX ORDER — BISACODYL 10 MG
10 SUPPOSITORY, RECTAL RECTAL DAILY PRN
Status: DISCONTINUED | OUTPATIENT
Start: 2024-09-21 | End: 2024-09-23 | Stop reason: HOSPADM

## 2024-09-21 RX ORDER — LEVETIRACETAM 500 MG/1
500 TABLET ORAL 2 TIMES DAILY
Status: DISCONTINUED | OUTPATIENT
Start: 2024-09-21 | End: 2024-09-23 | Stop reason: HOSPADM

## 2024-09-21 RX ORDER — LACTULOSE 10 G/15ML
20 SOLUTION ORAL 4 TIMES DAILY
Status: DISCONTINUED | OUTPATIENT
Start: 2024-09-21 | End: 2024-09-23 | Stop reason: HOSPADM

## 2024-09-21 RX ORDER — FUROSEMIDE 10 MG/ML
40 INJECTION INTRAMUSCULAR; INTRAVENOUS DAILY
Status: DISCONTINUED | OUTPATIENT
Start: 2024-09-21 | End: 2024-09-23 | Stop reason: HOSPADM

## 2024-09-21 RX ORDER — LACTULOSE 10 G/15ML
30 SOLUTION ORAL ONCE
Status: DISCONTINUED | OUTPATIENT
Start: 2024-09-21 | End: 2024-09-21

## 2024-09-21 RX ORDER — SODIUM CHLORIDE, SODIUM LACTATE, POTASSIUM CHLORIDE, CALCIUM CHLORIDE 600; 310; 30; 20 MG/100ML; MG/100ML; MG/100ML; MG/100ML
75 INJECTION, SOLUTION INTRAVENOUS CONTINUOUS
Status: DISCONTINUED | OUTPATIENT
Start: 2024-09-21 | End: 2024-09-21

## 2024-09-21 RX ORDER — ONDANSETRON 2 MG/ML
4 INJECTION INTRAMUSCULAR; INTRAVENOUS EVERY 6 HOURS PRN
Status: DISCONTINUED | OUTPATIENT
Start: 2024-09-21 | End: 2024-09-23 | Stop reason: HOSPADM

## 2024-09-21 RX ORDER — POLYETHYLENE GLYCOL 3350 17 G/17G
17 POWDER, FOR SOLUTION ORAL DAILY PRN
Status: DISCONTINUED | OUTPATIENT
Start: 2024-09-21 | End: 2024-09-23 | Stop reason: HOSPADM

## 2024-09-21 RX ORDER — LACTULOSE 10 G/15ML
300 SOLUTION ORAL ONCE
Status: COMPLETED | OUTPATIENT
Start: 2024-09-21 | End: 2024-09-21

## 2024-09-21 RX ORDER — SODIUM CHLORIDE 9 MG/ML
40 INJECTION, SOLUTION INTRAVENOUS AS NEEDED
Status: DISCONTINUED | OUTPATIENT
Start: 2024-09-21 | End: 2024-09-23 | Stop reason: HOSPADM

## 2024-09-21 RX ORDER — DEXTROSE MONOHYDRATE 25 G/50ML
25 INJECTION, SOLUTION INTRAVENOUS
Status: DISCONTINUED | OUTPATIENT
Start: 2024-09-21 | End: 2024-09-23 | Stop reason: HOSPADM

## 2024-09-21 RX ORDER — PANTOPRAZOLE SODIUM 40 MG/1
40 TABLET, DELAYED RELEASE ORAL DAILY
Status: DISCONTINUED | OUTPATIENT
Start: 2024-09-21 | End: 2024-09-23 | Stop reason: HOSPADM

## 2024-09-21 RX ORDER — SODIUM CHLORIDE, SODIUM LACTATE, POTASSIUM CHLORIDE, CALCIUM CHLORIDE 600; 310; 30; 20 MG/100ML; MG/100ML; MG/100ML; MG/100ML
50 INJECTION, SOLUTION INTRAVENOUS CONTINUOUS
Status: ACTIVE | OUTPATIENT
Start: 2024-09-21 | End: 2024-09-21

## 2024-09-21 RX ADMIN — ROPINIROLE HYDROCHLORIDE 1 MG: 1 TABLET, FILM COATED ORAL at 20:51

## 2024-09-21 RX ADMIN — INSULIN LISPRO 2 UNITS: 100 INJECTION, SOLUTION INTRAVENOUS; SUBCUTANEOUS at 12:27

## 2024-09-21 RX ADMIN — FUROSEMIDE 40 MG: 10 INJECTION, SOLUTION INTRAMUSCULAR; INTRAVENOUS at 09:26

## 2024-09-21 RX ADMIN — Medication 10 ML: at 20:52

## 2024-09-21 RX ADMIN — LACTULOSE 20 G: 10 SOLUTION ORAL at 17:59

## 2024-09-21 RX ADMIN — INSULIN LISPRO 2 UNITS: 100 INJECTION, SOLUTION INTRAVENOUS; SUBCUTANEOUS at 17:59

## 2024-09-21 RX ADMIN — LEVETIRACETAM 500 MG: 500 TABLET, FILM COATED ORAL at 20:51

## 2024-09-21 RX ADMIN — Medication 10 ML: at 09:27

## 2024-09-21 RX ADMIN — ONDANSETRON 4 MG: 2 INJECTION INTRAMUSCULAR; INTRAVENOUS at 17:58

## 2024-09-21 RX ADMIN — LEVETIRACETAM 500 MG: 500 TABLET, FILM COATED ORAL at 10:34

## 2024-09-21 RX ADMIN — INSULIN GLARGINE 10 UNITS: 100 INJECTION, SOLUTION SUBCUTANEOUS at 20:51

## 2024-09-21 RX ADMIN — RIFAXIMIN 550 MG: 550 TABLET ORAL at 20:51

## 2024-09-21 RX ADMIN — PANTOPRAZOLE SODIUM 40 MG: 40 TABLET, DELAYED RELEASE ORAL at 10:34

## 2024-09-21 RX ADMIN — LACTULOSE 20 G: 10 SOLUTION ORAL at 20:51

## 2024-09-21 RX ADMIN — INSULIN LISPRO 2 UNITS: 100 INJECTION, SOLUTION INTRAVENOUS; SUBCUTANEOUS at 20:51

## 2024-09-21 RX ADMIN — LACTULOSE 300 ML: 10 SOLUTION ORAL at 03:22

## 2024-09-21 RX ADMIN — LACTULOSE 20 G: 10 SOLUTION ORAL at 10:36

## 2024-09-21 RX ADMIN — SODIUM CHLORIDE, POTASSIUM CHLORIDE, SODIUM LACTATE AND CALCIUM CHLORIDE 50 ML/HR: 600; 310; 30; 20 INJECTION, SOLUTION INTRAVENOUS at 06:23

## 2024-09-21 RX ADMIN — INSULIN LISPRO 4 UNITS: 100 INJECTION, SOLUTION INTRAVENOUS; SUBCUTANEOUS at 09:26

## 2024-09-21 NOTE — ED NOTES
Pt yelling while trying to give enema, 1/2 of bottle, approx 200ml was successful.   Attempted report to floor.

## 2024-09-21 NOTE — ED NOTES
Pt lying in bed, restless, yelling out. Pt had taken off O2 monitor and some cardiac jo. Pt had productive cough and spit on the bed. Sitter now at bedside. VS WNL.

## 2024-09-21 NOTE — ED TRIAGE NOTES
"EMS reports 'We were called by [facility] Burgos and they stated he was hallucinating. He has not told us that, he is altered. Pt will say a few words but won't necessarily answer questions.\" Pt is rolling in bed, shouting out, crying. Pt wet himself.   "
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
## 2024-09-21 NOTE — PLAN OF CARE
Goal Outcome Evaluation:  Plan of Care Reviewed With: patient        Progress: no change  Outcome Evaluation: Pt arrived to floor with loose BM. Pt alternates between screaming and crying incoherently, and yelling cursewords indiscriminately. Pt is disoriented and unable to answer questions, gaze is not fixed and wanders around the room when awake. ELTON

## 2024-09-21 NOTE — PLAN OF CARE
Goal Outcome Evaluation:  Plan of Care Reviewed With: patient        Progress: no change  Outcome Evaluation: Patient has been confused all day. He frequently moans and cusses, I think he does this when he needs the urinal or needs to have a bowel movement. No c/o pain, just that he needs to pee, but sometimes he cannot start.Vitals WNL. He was able to take pills and lactulose this morning.

## 2024-09-22 LAB
AMMONIA BLD-SCNC: 70 UMOL/L (ref 16–60)
ANION GAP SERPL CALCULATED.3IONS-SCNC: 9.3 MMOL/L (ref 5–15)
BUN SERPL-MCNC: 29 MG/DL (ref 6–20)
BUN/CREAT SERPL: 21 (ref 7–25)
CALCIUM SPEC-SCNC: 9.5 MG/DL (ref 8.6–10.5)
CHLORIDE SERPL-SCNC: 105 MMOL/L (ref 98–107)
CO2 SERPL-SCNC: 25.7 MMOL/L (ref 22–29)
CREAT SERPL-MCNC: 1.38 MG/DL (ref 0.76–1.27)
DEPRECATED RDW RBC AUTO: 47.1 FL (ref 37–54)
EGFRCR SERPLBLD CKD-EPI 2021: 59.3 ML/MIN/1.73
ERYTHROCYTE [DISTWIDTH] IN BLOOD BY AUTOMATED COUNT: 16.1 % (ref 12.3–15.4)
GLUCOSE BLDC GLUCOMTR-MCNC: 150 MG/DL (ref 70–99)
GLUCOSE BLDC GLUCOMTR-MCNC: 156 MG/DL (ref 70–99)
GLUCOSE BLDC GLUCOMTR-MCNC: 177 MG/DL (ref 70–99)
GLUCOSE BLDC GLUCOMTR-MCNC: 287 MG/DL (ref 70–99)
GLUCOSE SERPL-MCNC: 151 MG/DL (ref 65–99)
HCT VFR BLD AUTO: 25.2 % (ref 37.5–51)
HGB BLD-MCNC: 7.9 G/DL (ref 13–17.7)
MAGNESIUM SERPL-MCNC: 1.7 MG/DL (ref 1.6–2.6)
MCH RBC QN AUTO: 25.3 PG (ref 26.6–33)
MCHC RBC AUTO-ENTMCNC: 31.3 G/DL (ref 31.5–35.7)
MCV RBC AUTO: 80.8 FL (ref 79–97)
PLATELET # BLD AUTO: 84 10*3/MM3 (ref 140–450)
PMV BLD AUTO: 12 FL (ref 6–12)
POTASSIUM SERPL-SCNC: 3.9 MMOL/L (ref 3.5–5.2)
RBC # BLD AUTO: 3.12 10*6/MM3 (ref 4.14–5.8)
SODIUM SERPL-SCNC: 140 MMOL/L (ref 136–145)
WBC NRBC COR # BLD AUTO: 5.27 10*3/MM3 (ref 3.4–10.8)

## 2024-09-22 PROCEDURE — 82948 REAGENT STRIP/BLOOD GLUCOSE: CPT

## 2024-09-22 PROCEDURE — 99232 SBSQ HOSP IP/OBS MODERATE 35: CPT | Performed by: INTERNAL MEDICINE

## 2024-09-22 PROCEDURE — 63710000001 INSULIN LISPRO (HUMAN) PER 5 UNITS: Performed by: FAMILY MEDICINE

## 2024-09-22 PROCEDURE — 85027 COMPLETE CBC AUTOMATED: CPT | Performed by: FAMILY MEDICINE

## 2024-09-22 PROCEDURE — 25010000002 FUROSEMIDE PER 20 MG: Performed by: FAMILY MEDICINE

## 2024-09-22 PROCEDURE — 83735 ASSAY OF MAGNESIUM: CPT | Performed by: INTERNAL MEDICINE

## 2024-09-22 PROCEDURE — 94799 UNLISTED PULMONARY SVC/PX: CPT

## 2024-09-22 PROCEDURE — 80048 BASIC METABOLIC PNL TOTAL CA: CPT | Performed by: FAMILY MEDICINE

## 2024-09-22 PROCEDURE — 63710000001 INSULIN GLARGINE PER 5 UNITS: Performed by: FAMILY MEDICINE

## 2024-09-22 PROCEDURE — 82140 ASSAY OF AMMONIA: CPT | Performed by: INTERNAL MEDICINE

## 2024-09-22 PROCEDURE — 25010000002 ONDANSETRON PER 1 MG: Performed by: FAMILY MEDICINE

## 2024-09-22 RX ORDER — CARVEDILOL 25 MG/1
25 TABLET ORAL 2 TIMES DAILY WITH MEALS
Status: DISCONTINUED | OUTPATIENT
Start: 2024-09-22 | End: 2024-09-23 | Stop reason: HOSPADM

## 2024-09-22 RX ORDER — BUSPIRONE HYDROCHLORIDE 15 MG/1
7.5 TABLET ORAL 3 TIMES DAILY
Status: DISCONTINUED | OUTPATIENT
Start: 2024-09-22 | End: 2024-09-23 | Stop reason: HOSPADM

## 2024-09-22 RX ORDER — ONDANSETRON 4 MG/1
4 TABLET, ORALLY DISINTEGRATING ORAL EVERY 6 HOURS PRN
Status: DISCONTINUED | OUTPATIENT
Start: 2024-09-22 | End: 2024-09-23 | Stop reason: HOSPADM

## 2024-09-22 RX ORDER — OXYCODONE HYDROCHLORIDE 5 MG/1
10 TABLET ORAL EVERY 8 HOURS PRN
Status: DISCONTINUED | OUTPATIENT
Start: 2024-09-22 | End: 2024-09-23 | Stop reason: HOSPADM

## 2024-09-22 RX ORDER — TRAZODONE HYDROCHLORIDE 50 MG/1
50 TABLET, FILM COATED ORAL NIGHTLY
Status: DISCONTINUED | OUTPATIENT
Start: 2024-09-22 | End: 2024-09-23 | Stop reason: HOSPADM

## 2024-09-22 RX ADMIN — Medication 10 ML: at 22:48

## 2024-09-22 RX ADMIN — LEVETIRACETAM 500 MG: 500 TABLET, FILM COATED ORAL at 22:49

## 2024-09-22 RX ADMIN — INSULIN LISPRO 2 UNITS: 100 INJECTION, SOLUTION INTRAVENOUS; SUBCUTANEOUS at 17:06

## 2024-09-22 RX ADMIN — ONDANSETRON 4 MG: 2 INJECTION INTRAMUSCULAR; INTRAVENOUS at 13:55

## 2024-09-22 RX ADMIN — FUROSEMIDE 40 MG: 10 INJECTION, SOLUTION INTRAMUSCULAR; INTRAVENOUS at 08:05

## 2024-09-22 RX ADMIN — CARVEDILOL 25 MG: 25 TABLET, FILM COATED ORAL at 17:00

## 2024-09-22 RX ADMIN — LACTULOSE 20 G: 10 SOLUTION ORAL at 17:01

## 2024-09-22 RX ADMIN — RIFAXIMIN 550 MG: 550 TABLET ORAL at 22:48

## 2024-09-22 RX ADMIN — INSULIN LISPRO 2 UNITS: 100 INJECTION, SOLUTION INTRAVENOUS; SUBCUTANEOUS at 08:05

## 2024-09-22 RX ADMIN — OXYCODONE HYDROCHLORIDE 10 MG: 5 TABLET ORAL at 17:00

## 2024-09-22 RX ADMIN — TRAZODONE HYDROCHLORIDE 50 MG: 50 TABLET ORAL at 22:48

## 2024-09-22 RX ADMIN — LACTULOSE 20 G: 10 SOLUTION ORAL at 22:48

## 2024-09-22 RX ADMIN — ROPINIROLE HYDROCHLORIDE 1 MG: 1 TABLET, FILM COATED ORAL at 22:48

## 2024-09-22 RX ADMIN — BUSPIRONE HYDROCHLORIDE 7.5 MG: 15 TABLET ORAL at 22:49

## 2024-09-22 RX ADMIN — Medication 10 ML: at 08:06

## 2024-09-22 RX ADMIN — ONDANSETRON 4 MG: 2 INJECTION INTRAMUSCULAR; INTRAVENOUS at 08:05

## 2024-09-22 RX ADMIN — INSULIN GLARGINE 10 UNITS: 100 INJECTION, SOLUTION SUBCUTANEOUS at 22:48

## 2024-09-22 RX ADMIN — RIFAXIMIN 550 MG: 550 TABLET ORAL at 08:06

## 2024-09-22 RX ADMIN — LACTULOSE 20 G: 10 SOLUTION ORAL at 08:05

## 2024-09-22 RX ADMIN — PANTOPRAZOLE SODIUM 40 MG: 40 TABLET, DELAYED RELEASE ORAL at 08:06

## 2024-09-22 RX ADMIN — BUSPIRONE HYDROCHLORIDE 7.5 MG: 15 TABLET ORAL at 17:00

## 2024-09-22 RX ADMIN — INSULIN LISPRO 6 UNITS: 100 INJECTION, SOLUTION INTRAVENOUS; SUBCUTANEOUS at 22:56

## 2024-09-22 RX ADMIN — LACTULOSE 20 G: 10 SOLUTION ORAL at 12:19

## 2024-09-22 RX ADMIN — INSULIN LISPRO 2 UNITS: 100 INJECTION, SOLUTION INTRAVENOUS; SUBCUTANEOUS at 12:19

## 2024-09-22 RX ADMIN — LEVETIRACETAM 500 MG: 500 TABLET, FILM COATED ORAL at 08:06

## 2024-09-22 NOTE — PLAN OF CARE
Goal Outcome Evaluation:  Plan of Care Reviewed With: patient        Progress: improving  Outcome Evaluation: Patient's mental status much improved. Getting up to commode with standby. C/o generalized pain, just got PRN oxy ordered, will give. Vitals WNL. Expected discharge back to [facility] tomorrow.

## 2024-09-22 NOTE — PLAN OF CARE
Goal Outcome Evaluation:  Plan of Care Reviewed With: patient        Progress: no change  Outcome Evaluation: Pt has been refusing bipap. V60 on standby at bedside. Pt currently on room air resting at this time.

## 2024-09-22 NOTE — PLAN OF CARE
Goal Outcome Evaluation:   Pt will not try to wear BiPAP. Pt agitated and not cooperative with unit. Rn aware.

## 2024-09-22 NOTE — PROGRESS NOTES
[facility]   Hospitalist Progress Note  Date: 2024  Patient Name: Nic Nicolas  : 1965  MRN: [id number]  Date of admission: 2024  Room/Bed: 3004/1      Subjective   Subjective     Chief Complaint: Change in mental status     Summary:Nic Nicolas is a 59 y.o. male with past medical history of cirrhosis, diabetes, hyperlipidemia, hypertension, DHF, BREE, traumatic brain injury, asthma, obesity, and GERD presented to the ED from nursing home for evaluation of altered mental status.  When seen patient was completely disoriented and lethargic so history was taken via chart review and physician handoff.  History was difficult to gather from nursing home but they brought him in due to significantly altered mental status and occasional agitation.  Review of his home medications did not show any rifaximin or lactulose.  In the ED patient's vitals were all within normal limits on arrival.  Labs showed lactic acidosis with significant elevated ammonia of 288 along with pancytopenia.  Chest x-ray showed pulmonary edema.  Patient admitted for evaluation and treatment       Interval Followup:   Patient's mental status has returned back to normal.  Ammonia level almost back to normal.  Patient is requesting to discharge back to his nursing home  Patient denies any specific complaints today  Vital signs stable    Review of Systems    All systems reviewed and negative except for what is outlined above.      Objective   Objective     Vitals:   Temp:  [97.9 °F (36.6 °C)-98.4 °F (36.9 °C)] 98.1 °F (36.7 °C)  Heart Rate:  [86-97] 86  Resp:  [20] 20  BP: (105-148)/(46-66) 123/48    Physical Exam   General: Awake, alert, NAD.  Patient is resting in bed  HENT: NCAT, MMM  Eyes: pupils equal, no scleral icterus  Cardiovascular: RRR, no murmurs   Pulmonary: CTA bilaterally; no wheezes; no conversational dyspnea  Gastrointestinal: S/ND/NT, +BS  Musculoskeletal: No gross deformities  Skin: No jaundice, no rash on  exposed skin appreciated  Neuro: CN II through XII grossly intact; speech clear; no tremor  Psych: Mood and affect appropriate  : No Zamora catheter; no suprapubic tenderness    Result Review    Result Review:  I have personally reviewed these results:  [x]  Laboratory      Lab 09/22/24  0630 09/21/24  0125 09/21/24  0108   WBC 5.27  --  3.01*   HEMOGLOBIN 7.9*  --  7.5*   HEMATOCRIT 25.2*  --  24.0*   PLATELETS 84*  --  54*   NEUTROS ABS  --   --  2.08   IMMATURE GRANS (ABS)  --   --  0.01   LYMPHS ABS  --   --  0.57*   MONOS ABS  --   --  0.29   EOS ABS  --   --  0.04   MCV 80.8  --  82.5   LACTATE  --  2.2*  --          Lab 09/22/24  0630 09/21/24  0108   SODIUM 140 138   POTASSIUM 3.9 4.7   CHLORIDE 105 101   CO2 25.7 27.3   ANION GAP 9.3 9.7   BUN 29* 36*   CREATININE 1.38* 1.58*   EGFR 59.3* 50.4*   GLUCOSE 151* 304*   CALCIUM 9.5 8.8   MAGNESIUM 1.7 1.8   HEMOGLOBIN A1C  --  7.50*         Lab 09/21/24  0108   TOTAL PROTEIN 5.8*   ALBUMIN 3.1*   GLOBULIN 2.7   ALT (SGPT) 16   AST (SGOT) 31   BILIRUBIN 1.1   ALK PHOS 104         Lab 09/21/24  0108   PROBNP 163.5   HSTROP T 34*                 Brief Urine Lab Results  (Last result in the past 365 days)        Color   Clarity   Blood   Leuk Est   Nitrite   Protein   CREAT   Urine HCG        09/21/24 1253 Yellow   Clear   Negative   Negative   Negative   Negative                 [x]  Microbiology   Microbiology Results (last 10 days)       Procedure Component Value - Date/Time    COVID-19, FLU A/B, RSV PCR 1 HR TAT - Swab, Nasopharynx [[id number]]  (Normal) Collected: 09/21/24 0311    Lab Status: Final result Specimen: Swab from Nasopharynx Updated: 09/21/24 0454     COVID19 Not Detected     Influenza A PCR Not Detected     Influenza B PCR Not Detected     RSV, PCR Not Detected    Narrative:      Fact sheet for providers: https://www.fda.gov/media/906007/download    Fact sheet for patients: https://www.fda.gov/media/920410/download    Test performed by PCR.     Blood Culture - Blood, Arm, Right [[id number]]  (Normal) Collected: 09/21/24 0300    Lab Status: Preliminary result Specimen: Blood from Arm, Right Updated: 09/22/24 0315     Blood Culture No growth at 24 hours    Blood Culture - Blood, Arm, Right [[id number]]  (Normal) Collected: 09/21/24 0245    Lab Status: Preliminary result Specimen: Blood from Arm, Right Updated: 09/22/24 0315     Blood Culture No growth at 24 hours          [x]  Radiology  XR Chest 1 View    Result Date: 9/21/2024  New bilateral infiltrates are seen. The findings may represent infectious multifocal pneumonia. Pulmonary edema cannot be excluded.    Portions of this note were completed with a voice recognition program.       Electronically Signed By-Herberth Arevalo MD On:9/21/2024 1:01 AM     []  EKG/Telemetry   []  Cardiology/Vascular   []  Pathology  []  Old records  []  Other:    Assessment & Plan   Assessment / Plan     Assessment:  Hepatic encephalopathy  Elevated ammonia level  History of ROMO cirrhosis  Hypertension  Diabetes mellitus type 2 on insulin  Restless leg syndrome  Obstructive sleep apnea    Plan:  Patient remains admitted to the medicine service  Continue patient on scheduled lactulose.  Ammonia level is coming down to within normal limits  Continue patient on rifaximin  Continue sliding scale insulin and Lantus for patient's diabetes  Resume patient on Coreg for his history of hypertension  Continue patient on IV Lasix  Continue Requip for his restless leg syndrome  Patient's blood cultures remain negative  Repeat laboratory data in the morning  Suspect patient can discharge back to the nursing home in the morning       Discussed with RN.    VTE Prophylaxis:  Mechanical VTE prophylaxis orders are present.        CODE STATUS:   Level Of Support Discussed With: Patient  Code Status (Patient has no pulse and is not breathing): CPR (Attempt to Resuscitate)  Medical Interventions (Patient has pulse or is breathing): Full  Support      Electronically signed by Jay Bridges DO, 9/22/2024, 11:16 EDT.

## 2024-09-23 VITALS
DIASTOLIC BLOOD PRESSURE: 49 MMHG | HEART RATE: 76 BPM | WEIGHT: 260.14 LBS | TEMPERATURE: 97.9 F | RESPIRATION RATE: 18 BRPM | SYSTOLIC BLOOD PRESSURE: 114 MMHG | BODY MASS INDEX: 39.43 KG/M2 | HEIGHT: 68 IN | OXYGEN SATURATION: 100 %

## 2024-09-23 LAB
ALBUMIN SERPL-MCNC: 2.9 G/DL (ref 3.5–5.2)
ALBUMIN/GLOB SERPL: 1.1 G/DL
ALP SERPL-CCNC: 89 U/L (ref 39–117)
ALT SERPL W P-5'-P-CCNC: 17 U/L (ref 1–41)
AMMONIA BLD-SCNC: 77 UMOL/L (ref 16–60)
ANION GAP SERPL CALCULATED.3IONS-SCNC: 11.4 MMOL/L (ref 5–15)
AST SERPL-CCNC: 35 U/L (ref 1–40)
BILIRUB SERPL-MCNC: 2.2 MG/DL (ref 0–1.2)
BUN SERPL-MCNC: 27 MG/DL (ref 6–20)
BUN/CREAT SERPL: 16.6 (ref 7–25)
CALCIUM SPEC-SCNC: 8.5 MG/DL (ref 8.6–10.5)
CHLORIDE SERPL-SCNC: 99 MMOL/L (ref 98–107)
CO2 SERPL-SCNC: 23.6 MMOL/L (ref 22–29)
CREAT SERPL-MCNC: 1.63 MG/DL (ref 0.76–1.27)
DEPRECATED RDW RBC AUTO: 46.4 FL (ref 37–54)
EGFRCR SERPLBLD CKD-EPI 2021: 48.5 ML/MIN/1.73
ERYTHROCYTE [DISTWIDTH] IN BLOOD BY AUTOMATED COUNT: 15.9 % (ref 12.3–15.4)
GLOBULIN UR ELPH-MCNC: 2.6 GM/DL
GLUCOSE BLDC GLUCOMTR-MCNC: 183 MG/DL (ref 70–99)
GLUCOSE BLDC GLUCOMTR-MCNC: 241 MG/DL (ref 70–99)
GLUCOSE SERPL-MCNC: 203 MG/DL (ref 65–99)
HCT VFR BLD AUTO: 24.5 % (ref 37.5–51)
HGB BLD-MCNC: 7.5 G/DL (ref 13–17.7)
MCH RBC QN AUTO: 24.8 PG (ref 26.6–33)
MCHC RBC AUTO-ENTMCNC: 30.6 G/DL (ref 31.5–35.7)
MCV RBC AUTO: 80.9 FL (ref 79–97)
PLATELET # BLD AUTO: 72 10*3/MM3 (ref 140–450)
PMV BLD AUTO: 12 FL (ref 6–12)
POTASSIUM SERPL-SCNC: 4.2 MMOL/L (ref 3.5–5.2)
PROT SERPL-MCNC: 5.5 G/DL (ref 6–8.5)
RBC # BLD AUTO: 3.03 10*6/MM3 (ref 4.14–5.8)
SODIUM SERPL-SCNC: 134 MMOL/L (ref 136–145)
WBC NRBC COR # BLD AUTO: 4.51 10*3/MM3 (ref 3.4–10.8)

## 2024-09-23 PROCEDURE — 85027 COMPLETE CBC AUTOMATED: CPT | Performed by: FAMILY MEDICINE

## 2024-09-23 PROCEDURE — 82948 REAGENT STRIP/BLOOD GLUCOSE: CPT | Performed by: FAMILY MEDICINE

## 2024-09-23 PROCEDURE — 80053 COMPREHEN METABOLIC PANEL: CPT | Performed by: INTERNAL MEDICINE

## 2024-09-23 PROCEDURE — 25010000002 FUROSEMIDE PER 20 MG: Performed by: FAMILY MEDICINE

## 2024-09-23 PROCEDURE — 82140 ASSAY OF AMMONIA: CPT | Performed by: INTERNAL MEDICINE

## 2024-09-23 PROCEDURE — 97165 OT EVAL LOW COMPLEX 30 MIN: CPT

## 2024-09-23 PROCEDURE — 63710000001 INSULIN LISPRO (HUMAN) PER 5 UNITS: Performed by: FAMILY MEDICINE

## 2024-09-23 PROCEDURE — 99239 HOSP IP/OBS DSCHRG MGMT >30: CPT | Performed by: INTERNAL MEDICINE

## 2024-09-23 PROCEDURE — 94799 UNLISTED PULMONARY SVC/PX: CPT

## 2024-09-23 RX ORDER — LACTULOSE 10 G/15ML
20 SOLUTION ORAL 4 TIMES DAILY
Status: ON HOLD
Start: 2024-09-23

## 2024-09-23 RX ADMIN — BUSPIRONE HYDROCHLORIDE 7.5 MG: 15 TABLET ORAL at 08:36

## 2024-09-23 RX ADMIN — BUSPIRONE HYDROCHLORIDE 7.5 MG: 15 TABLET ORAL at 16:12

## 2024-09-23 RX ADMIN — OXYCODONE HYDROCHLORIDE 10 MG: 5 TABLET ORAL at 01:13

## 2024-09-23 RX ADMIN — PANTOPRAZOLE SODIUM 40 MG: 40 TABLET, DELAYED RELEASE ORAL at 08:36

## 2024-09-23 RX ADMIN — Medication 10 ML: at 08:38

## 2024-09-23 RX ADMIN — LACTULOSE 20 G: 10 SOLUTION ORAL at 08:37

## 2024-09-23 RX ADMIN — LACTULOSE 20 G: 10 SOLUTION ORAL at 12:47

## 2024-09-23 RX ADMIN — OXYCODONE HYDROCHLORIDE 10 MG: 5 TABLET ORAL at 16:13

## 2024-09-23 RX ADMIN — LEVETIRACETAM 500 MG: 500 TABLET, FILM COATED ORAL at 08:36

## 2024-09-23 RX ADMIN — INSULIN LISPRO 2 UNITS: 100 INJECTION, SOLUTION INTRAVENOUS; SUBCUTANEOUS at 08:37

## 2024-09-23 RX ADMIN — FUROSEMIDE 40 MG: 10 INJECTION, SOLUTION INTRAMUSCULAR; INTRAVENOUS at 08:37

## 2024-09-23 RX ADMIN — CARVEDILOL 25 MG: 25 TABLET, FILM COATED ORAL at 08:36

## 2024-09-23 RX ADMIN — RIFAXIMIN 550 MG: 550 TABLET ORAL at 08:37

## 2024-09-23 RX ADMIN — INSULIN LISPRO 4 UNITS: 100 INJECTION, SOLUTION INTRAVENOUS; SUBCUTANEOUS at 12:47

## 2024-09-23 RX ADMIN — OXYCODONE HYDROCHLORIDE 10 MG: 5 TABLET ORAL at 08:36

## 2024-09-23 NOTE — DISCHARGE SUMMARY
[facility]         HOSPITALIST  DISCHARGE SUMMARY    Patient Name: Nic Nicolas  : 1965  MRN: [id number]    Date of Admission: 2024  Date of Discharge:  2024    Primary Care Physician: Sheldon Carcamo MD    Consults       Date and Time Order Name Status Description    2024  2:17 AM Inpatient Hospitalist Consult              Active and Resolved Hospital Problems:  Active Hospital Problems    Diagnosis POA    **Hepatic encephalopathy [K76.82] Yes    Anxiety [F41.9] Yes    Sleep apnea [G47.30] Yes    Liver cirrhosis secondary to ROMO (nonalcoholic steatohepatitis) [K75.81, K74.60] Yes    High blood pressure [I10] Yes    Systolic congestive heart failure [I50.20] Yes    Type 2 diabetes mellitus with hyperglycemia [E11.65] Yes    Chronic low back pain [M54.50, G89.29] Yes      Resolved Hospital Problems   No resolved problems to display.       Hospital Course     Hospital Course:  Nic Nicolas is a 59 y.o. male  with past medical history of cirrhosis, diabetes, hyperlipidemia, hypertension, DHF, BREE, traumatic brain injury, asthma, obesity, and GERD presented to the ED from nursing home for evaluation of altered mental status.  When seen patient was completely disoriented and lethargic so history was taken via chart review and physician handoff.  History was difficult to gather from nursing home but they brought him in due to significantly altered mental status and occasional agitation.  Review of his home medications did not show any rifaximin or lactulose.  In the ED patient's vitals were all within normal limits on arrival.  Labs showed lactic acidosis with significant elevated ammonia of 288 along with pancytopenia.  Chest x-ray showed pulmonary edema.  Patient admitted for evaluation and treatment.  Patient was admitted to the hospital started on lactulose.  Ammonia level improved and mental status returned to baseline and patient requested to discharge back  to the nursing home.  Patient stated that he does not get his medications as prescribed and on time at the nursing home and that was why his ammonia level was elevated.  I have increased his lactulose frequency and it can be titrated based on his bowel movements.  Patient's blood cultures show no growth.  Patient is requesting discharge back to the nursing home.  Will discharge patient back today in stable condition.       Day of Discharge     Vital Signs:  Temp:  [97.2 °F (36.2 °C)-99 °F (37.2 °C)] 98.5 °F (36.9 °C)  Heart Rate:  [76-92] 76  Resp:  [18-20] 18  BP: (116-145)/(57-69) 116/64  Physical Exam:   General: Awake, alert, NAD.  Patient is resting in bed  HENT: NCAT, MMM  Eyes: pupils equal, no scleral icterus  Cardiovascular: RRR, no murmurs   Pulmonary: CTA bilaterally; no wheezes; no conversational dyspnea  Gastrointestinal: S/ND/NT, +BS  Musculoskeletal: No gross deformities  Skin: No jaundice, no rash on exposed skin appreciated  Neuro: CN II through XII grossly intact; speech clear; no tremor  Psych: Mood and affect appropriate  : No Zamroa catheter; no suprapubic tenderness      Discharge Details        Discharge Medications        Changes to Medications        Instructions Start Date   lactulose 10 GM/15ML solution  Commonly known as: CHRONULAC  What changed:   medication strength  when to take this   20 g, Oral, 4 Times Daily             Continue These Medications        Instructions Start Date   acetaminophen 325 MG chewable tablet  Commonly known as: TYLENOL   650 mg, Oral, Every 6 Hours PRN      busPIRone 7.5 MG tablet  Commonly known as: BUSPAR   7.5 mg, Oral, 3 Times Daily      carvedilol 25 MG tablet  Commonly known as: COREG   25 mg, Oral, 2 Times Daily With Meals      Diclofenac Sodium 1 % gel gel  Commonly known as: VOLTAREN   4 g, Topical, 4 Times Daily      famotidine 20 MG tablet  Commonly known as: PEPCID   20 mg, Oral, Daily      Flovent  MCG/ACT inhaler  Generic drug:  fluticasone   1 puff, Inhalation, 2 Times Daily - RT      fluticasone 50 MCG/ACT nasal spray  Commonly known as: FLONASE   1 spray, Nasal, Daily      FT Dry Eye Relief 1 % solution ophthalmic solution  Generic drug: polyethylene Glycol 400   2 drops, Both Eyes, 3 Times Daily PRN      furosemide 40 MG tablet  Commonly known as: LASIX   TAKE 1 TABLET BY MOUTH 2 (TWO) TIMES A DAY.      HumaLOG KwikPen 100 UNIT/ML solution pen-injector  Generic drug: Insulin Lispro (1 Unit Dial)   15 Units, Subcutaneous, 3 Times Daily Before Meals      hydrocortisone 1 % cream   1 Application, Topical, 2 Times Daily PRN      hydrOXYzine 25 MG tablet  Commonly known as: ATARAX   Take 1 tablet by mouth Every 8 (Eight) Hours As Needed for Itching.      Levemir FlexPen 100 UNIT/ML injection  Generic drug: insulin detemir   10 Units, Subcutaneous, Daily      levETIRAcetam 500 MG tablet  Commonly known as: Keppra   500 mg, Oral, 2 Times Daily      LidozenPatch 4-1 % patch  Generic drug: Lidocaine-Menthol   1 patch, Apply externally, Every 12 Hours      magic barrier cream   1 Application, Topical, 2 Times Daily      magnesium oxide 400 MG tablet  Commonly known as: MAG-OX   400 mg, Oral, Daily      ondansetron 4 MG tablet  Commonly known as: Zofran   4 mg, Oral, Every 8 Hours PRN      oxyCODONE 10 MG tablet  Commonly known as: ROXICODONE   Take 1 tablet by mouth Every 8 (Eight) Hours As Needed for Moderate Pain or Severe Pain.      pantoprazole 40 MG EC tablet  Commonly known as: PROTONIX   40 mg, Oral, Daily      potassium chloride 20 MEQ CR tablet  Commonly known as: KLOR-CON M20   Take 1 tablet by mouth Daily.      rOPINIRole 1 MG tablet  Commonly known as: REQUIP   1 mg, Oral, Nightly, take 1 hour before bedtime      sertraline 100 MG tablet  Commonly known as: ZOLOFT   100 mg, Oral, Nightly      simethicone 80 MG chewable tablet  Commonly known as: MYLICON   80 mg, Oral, 3 Times Daily Before Meals      spironolactone 100 MG  tablet  Commonly known as: Aldactone   100 mg, Oral, 2 Times Daily      sucralfate 1 g tablet  Commonly known as: CARAFATE   1 g, Oral, 4 Times Daily      traZODone 50 MG tablet  Commonly known as: DESYREL   50 mg, Oral, Nightly      triamcinolone 0.1 % cream  Commonly known as: KENALOG   Apply 1 Application topically to the appropriate area as directed 2 (Two) Times a Day.      Vitamin D3 50 MCG (2000 UT) capsule   2,000 Units, Oral, Daily      Xifaxan 550 MG tablet  Generic drug: riFAXIMin   Take 1 tablet by mouth Every 12 (Twelve) Hours.             ASK your doctor about these medications        Instructions Start Date   oxyCODONE-acetaminophen  MG per tablet  Commonly known as: PERCOCET   1 tablet, Every 8 Hours PRN               No Known Allergies    Discharge Disposition:  Long Term Care (DC - External)    Diet:  Hospital:  Diet Order   Procedures    Diet: Diabetic; Consistent Carbohydrate; Fluid Consistency: Thin (IDDSI 0)       Discharge Activity: as tolerated       CODE STATUS:  Code Status and Medical Interventions: CPR (Attempt to Resuscitate); Full Support   Ordered at: 09/21/24 0238     Level Of Support Discussed With:    Patient     Code Status (Patient has no pulse and is not breathing):    CPR (Attempt to Resuscitate)     Medical Interventions (Patient has pulse or is breathing):    Full Support         Future Appointments   Date Time Provider Department Center   10/1/2024 10:00 AM CHAIR 5 [facility]   10/8/2024 10:00 AM CHAIR 5 [facility] OP[facility]   10/15/2024 10:00 [facility]   10/22/2024 10:00 AM CHAIR 3 [facility]   12/10/2024  9:30 AM NURSE/MA ONC ET[place] [facility] ONC [address] [facility]   12/13/2024 11:30 AM Juan Jose Sanchez MD [facility] ONC E521 [facility]   2/11/2025  2:30 PM Karlos Roblero MD [facility] GE ETW [facility]           Pertinent  and/or Most Recent Results     PROCEDURES:   None    LAB RESULTS:      Lab 09/23/24  0550 09/22/24  0630  09/21/24  0125 09/21/24  0108   WBC 4.51 5.27  --  3.01*   HEMOGLOBIN 7.5* 7.9*  --  7.5*   HEMATOCRIT 24.5* 25.2*  --  24.0*   PLATELETS 72* 84*  --  54*   NEUTROS ABS  --   --   --  2.08   IMMATURE GRANS (ABS)  --   --   --  0.01   LYMPHS ABS  --   --   --  0.57*   MONOS ABS  --   --   --  0.29   EOS ABS  --   --   --  0.04   MCV 80.9 80.8  --  82.5   LACTATE  --   --  2.2*  --          Lab 09/23/24  0550 09/22/24  0630 09/21/24  0108   SODIUM 134* 140 138   POTASSIUM 4.2 3.9 4.7   CHLORIDE 99 105 101   CO2 23.6 25.7 27.3   ANION GAP 11.4 9.3 9.7   BUN 27* 29* 36*   CREATININE 1.63* 1.38* 1.58*   EGFR 48.5* 59.3* 50.4*   GLUCOSE 203* 151* 304*   CALCIUM 8.5* 9.5 8.8   MAGNESIUM  --  1.7 1.8   HEMOGLOBIN A1C  --   --  7.50*         Lab 09/23/24  0550 09/21/24  0108   TOTAL PROTEIN 5.5* 5.8*   ALBUMIN 2.9* 3.1*   GLOBULIN 2.6 2.7   ALT (SGPT) 17 16   AST (SGOT) 35 31   BILIRUBIN 2.2* 1.1   ALK PHOS 89 104         Lab 09/21/24  0108   PROBNP 163.5   HSTROP T 34*                 Brief Urine Lab Results  (Last result in the past 365 days)        Color   Clarity   Blood   Leuk Est   Nitrite   Protein   CREAT   Urine HCG        09/21/24 1253 Yellow   Clear   Negative   Negative   Negative   Negative                 Microbiology Results (last 10 days)       Procedure Component Value - Date/Time    COVID-19, FLU A/B, RSV PCR 1 HR TAT - Swab, Nasopharynx [[id number]]  (Normal) Collected: 09/21/24 0311    Lab Status: Final result Specimen: Swab from Nasopharynx Updated: 09/21/24 1258     COVID19 Not Detected     Influenza A PCR Not Detected     Influenza B PCR Not Detected     RSV, PCR Not Detected    Narrative:      Fact sheet for providers: https://www.fda.gov/media/862924/download    Fact sheet for patients: https://www.fda.gov/media/036689/download    Test performed by PCR.    Blood Culture - Blood, Arm, Right [[id number]]  (Normal) Collected: 09/21/24 0300    Lab Status: Preliminary result Specimen: Blood from Arm, Right  Updated: 09/23/24 0315     Blood Culture No growth at 2 days    Blood Culture - Blood, Arm, Right [[id number]]  (Normal) Collected: 09/21/24 0245    Lab Status: Preliminary result Specimen: Blood from Arm, Right Updated: 09/23/24 0315     Blood Culture No growth at 2 days            XR Chest 1 View    Result Date: 9/21/2024  New bilateral infiltrates are seen. The findings may represent infectious multifocal pneumonia. Pulmonary edema cannot be excluded.    Portions of this note were completed with a voice recognition program.       Electronically Signed By-Herberth Arevalo MD On:9/21/2024 1:01 AM                    Labs Pending at Discharge:  Pending Labs       Order Current Status    Levetiracetam Level (Keppra) In process    Blood Culture - Blood, Arm, Right Preliminary result    Blood Culture - Blood, Arm, Right Preliminary result              Time spent on Discharge including face to face service:  more than 35 minutes    Electronically signed by Jay Bridges DO, 09/23/24, 1:03 PM EDT.     Functional Status: 0 (fully active)

## 2024-09-23 NOTE — PLAN OF CARE
Goal Outcome Evaluation:  Plan of Care Reviewed With: patient        Progress: improving  Outcome Evaluation: Patient reports he is at his baseline of function and does not require any additional therapy.  Patient is able to walk to and from the bathroom without difficulty and dress himself with no outside assistance.  Patient being discharged from OT services at this time      Anticipated Discharge Disposition (OT): sub acute care setting
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
## 2024-09-23 NOTE — PLAN OF CARE
Goal Outcome Evaluation:  Plan of Care Reviewed With: patient        Progress: no change  Outcome Evaluation: Patient is on room air tolerating fine. Bipap is on standby if needed. Patient has yet to wear unit. Will continue to monitor.

## 2024-09-23 NOTE — PLAN OF CARE
Problem: Adult Inpatient Plan of Care  Goal: Optimal Comfort and Wellbeing  Outcome: Ongoing, Not Progressing  Intervention: Monitor Pain and Promote Comfort  Flowsheets (Taken 9/23/2024 0525)  Pain Management Interventions: (pt reports he has chronic pain rated at a 10 of 10; pt reports the oxycodone did not really help.) --  Intervention: Provide Person-Centered Care  Flowsheets (Taken 9/22/2024 0800 by Jas Flores, RN)  Trust Relationship/Rapport:   care explained   choices provided   emotional support provided   empathic listening provided   questions answered   questions encouraged   reassurance provided   thoughts/feelings acknowledged     Problem: Osteoarthritis Comorbidity  Goal: Maintenance of Osteoarthritis Symptom Control  Outcome: Ongoing, Not Progressing  Intervention: Maintain Osteoarthritis Symptom Control  Recent Flowsheet Documentation  Taken 9/23/2024 0000 by Taty Thompson, RN  Activity Management: ambulated outside room  Taken 9/22/2024 2030 by Taty Thompson, RN  Activity Management: sitting, edge of bed   Goal Outcome Evaluation:  Plan of Care Reviewed With: patient           Outcome Evaluation: Pt oriented x 4 this shift.  Getting up to bathroom with minimal assist.  Pt c/o chronic pain all over.  Pt did ambulate at least 200 feet without difficulty this shift with this RN.

## 2024-09-23 NOTE — PLAN OF CARE
Goal Outcome Evaluation:   Pt does not wear BiPAP qhs. Pt is on room air.

## 2024-09-26 LAB
BACTERIA SPEC AEROBE CULT: NORMAL
BACTERIA SPEC AEROBE CULT: NORMAL
LEVETIRACETAM SERPL-MCNC: 32.2 UG/ML (ref 10–40)

## 2024-09-28 ENCOUNTER — APPOINTMENT (OUTPATIENT)
Dept: GENERAL RADIOLOGY | Facility: HOSPITAL | Age: 59
DRG: 442 | End: 2024-09-28
Payer: MEDICAID

## 2024-09-28 ENCOUNTER — HOSPITAL ENCOUNTER (INPATIENT)
Facility: HOSPITAL | Age: 59
LOS: 5 days | Discharge: NURSING, REHAB OR LONG-TERM CARE | DRG: 442 | End: 2024-10-03
Attending: EMERGENCY MEDICINE | Admitting: FAMILY MEDICINE
Payer: MEDICAID

## 2024-09-28 DIAGNOSIS — K76.82 HEPATIC ENCEPHALOPATHY: Primary | ICD-10-CM

## 2024-09-28 DIAGNOSIS — D69.3 CHRONIC IDIOPATHIC THROMBOCYTOPENIA: ICD-10-CM

## 2024-09-28 DIAGNOSIS — R26.2 DIFFICULTY IN WALKING: ICD-10-CM

## 2024-09-28 DIAGNOSIS — N18.9 CHRONIC KIDNEY DISEASE, UNSPECIFIED CKD STAGE: ICD-10-CM

## 2024-09-28 DIAGNOSIS — D64.9 CHRONIC ANEMIA: ICD-10-CM

## 2024-09-28 LAB
ALBUMIN SERPL-MCNC: 2.9 G/DL (ref 3.5–5.2)
ALBUMIN/GLOB SERPL: 1.1 G/DL
ALP SERPL-CCNC: 91 U/L (ref 39–117)
ALT SERPL W P-5'-P-CCNC: 20 U/L (ref 1–41)
AMMONIA BLD-SCNC: 190 UMOL/L (ref 16–60)
ANION GAP SERPL CALCULATED.3IONS-SCNC: 8.5 MMOL/L (ref 5–15)
AST SERPL-CCNC: 31 U/L (ref 1–40)
ATMOSPHERIC PRESS: 735.9 MMHG
BASE EXCESS BLDV CALC-SCNC: -4 MMOL/L (ref -2–2)
BASOPHILS # BLD AUTO: 0.02 10*3/MM3 (ref 0–0.2)
BASOPHILS NFR BLD AUTO: 0.9 % (ref 0–1.5)
BDY SITE: ABNORMAL
BILIRUB SERPL-MCNC: 1.2 MG/DL (ref 0–1.2)
BUN BLDA-MCNC: 42 MG/DL (ref 8–23)
BUN SERPL-MCNC: 45 MG/DL (ref 6–20)
BUN/CREAT SERPL: 17.8 (ref 7–25)
CA-I BLDA-SCNC: 1.11 MMOL/L (ref 1.13–1.32)
CALCIUM SPEC-SCNC: 8.9 MG/DL (ref 8.6–10.5)
CHLORIDE BLDA-SCNC: 104 MMOL/L (ref 98–107)
CHLORIDE SERPL-SCNC: 101 MMOL/L (ref 98–107)
CO2 BLDA-SCNC: 20.1 MMOL/L (ref 23–27)
CO2 SERPL-SCNC: 21.5 MMOL/L (ref 22–29)
CREAT BLDA-MCNC: 2.01 MG/DL (ref 0.6–1.3)
CREAT SERPL-MCNC: 2.53 MG/DL (ref 0.76–1.27)
D-LACTATE SERPL-SCNC: 2.2 MMOL/L (ref 0.5–2)
D-LACTATE SERPL-SCNC: 2.7 MMOL/L
DEPRECATED RDW RBC AUTO: 49.1 FL (ref 37–54)
EGFRCR SERPLBLD CKD-EPI 2021: 28.6 ML/MIN/1.73
EOSINOPHIL # BLD AUTO: 0.04 10*3/MM3 (ref 0–0.4)
EOSINOPHIL NFR BLD AUTO: 1.8 % (ref 0.3–6.2)
ERYTHROCYTE [DISTWIDTH] IN BLOOD BY AUTOMATED COUNT: 16.3 % (ref 12.3–15.4)
GLOBULIN UR ELPH-MCNC: 2.7 GM/DL
GLUCOSE BLDC GLUCOMTR-MCNC: 221 MG/DL (ref 70–99)
GLUCOSE SERPL-MCNC: 292 MG/DL (ref 65–99)
HCO3 BLDV-SCNC: 20.4 MMOL/L (ref 22–26)
HCT VFR BLD AUTO: 24 % (ref 37.5–51)
HGB BLD-MCNC: 7.3 G/DL (ref 13–17.7)
HGB BLDA-MCNC: 7.9 G/DL (ref 12–18)
HOLD SPECIMEN: NORMAL
HOLD SPECIMEN: NORMAL
IMM GRANULOCYTES # BLD AUTO: 0 10*3/MM3 (ref 0–0.05)
IMM GRANULOCYTES NFR BLD AUTO: 0 % (ref 0–0.5)
INHALED O2 CONCENTRATION: 21 %
LYMPHOCYTES # BLD AUTO: 0.53 10*3/MM3 (ref 0.7–3.1)
LYMPHOCYTES NFR BLD AUTO: 23.5 % (ref 19.6–45.3)
MAGNESIUM SERPL-MCNC: 2.3 MG/DL (ref 1.6–2.6)
MCH RBC QN AUTO: 25.1 PG (ref 26.6–33)
MCHC RBC AUTO-ENTMCNC: 30.4 G/DL (ref 31.5–35.7)
MCV RBC AUTO: 82.5 FL (ref 79–97)
MODALITY: ABNORMAL
MONOCYTES # BLD AUTO: 0.26 10*3/MM3 (ref 0.1–0.9)
MONOCYTES NFR BLD AUTO: 11.5 % (ref 5–12)
NEUTROPHILS NFR BLD AUTO: 1.41 10*3/MM3 (ref 1.7–7)
NEUTROPHILS NFR BLD AUTO: 62.3 % (ref 42.7–76)
NOTIFIED WHO: ABNORMAL
NOTIFIED WHO: ABNORMAL
NRBC BLD AUTO-RTO: 0 /100 WBC (ref 0–0.2)
PCO2 BLDV: 33.6 MM HG (ref 41–51)
PH BLDV: 7.39 PH UNITS (ref 7.31–7.41)
PLATELET # BLD AUTO: 56 10*3/MM3 (ref 140–450)
PMV BLD AUTO: 11 FL (ref 6–12)
PO2 BLDV: 41.9 MM HG (ref 35–42)
POTASSIUM BLDA-SCNC: 4.8 MMOL/L (ref 3.5–5)
POTASSIUM SERPL-SCNC: 5.7 MMOL/L (ref 3.5–5.2)
PROT SERPL-MCNC: 5.6 G/DL (ref 6–8.5)
QT INTERVAL: 455 MS
QTC INTERVAL: 452 MS
RBC # BLD AUTO: 2.91 10*6/MM3 (ref 4.14–5.8)
READ BACK: NO
SAO2 % BLDCOV: 77.6 % (ref 45–75)
SODIUM BLD-SCNC: 136 MMOL/L (ref 131–143)
SODIUM SERPL-SCNC: 131 MMOL/L (ref 136–145)
TROPONIN T SERPL HS-MCNC: 42 NG/L
WBC NRBC COR # BLD AUTO: 2.26 10*3/MM3 (ref 3.4–10.8)
WHOLE BLOOD HOLD COAG: NORMAL
WHOLE BLOOD HOLD SPECIMEN: NORMAL

## 2024-09-28 PROCEDURE — 80047 BASIC METABLC PNL IONIZED CA: CPT

## 2024-09-28 PROCEDURE — 93005 ELECTROCARDIOGRAM TRACING: CPT

## 2024-09-28 PROCEDURE — 85025 COMPLETE CBC W/AUTO DIFF WBC: CPT

## 2024-09-28 PROCEDURE — 82803 BLOOD GASES ANY COMBINATION: CPT

## 2024-09-28 PROCEDURE — 93005 ELECTROCARDIOGRAM TRACING: CPT | Performed by: EMERGENCY MEDICINE

## 2024-09-28 PROCEDURE — 99285 EMERGENCY DEPT VISIT HI MDM: CPT

## 2024-09-28 PROCEDURE — 63710000001 INSULIN GLARGINE PER 5 UNITS: Performed by: FAMILY MEDICINE

## 2024-09-28 PROCEDURE — 80053 COMPREHEN METABOLIC PANEL: CPT

## 2024-09-28 PROCEDURE — 83605 ASSAY OF LACTIC ACID: CPT

## 2024-09-28 PROCEDURE — 83735 ASSAY OF MAGNESIUM: CPT

## 2024-09-28 PROCEDURE — 71045 X-RAY EXAM CHEST 1 VIEW: CPT

## 2024-09-28 PROCEDURE — 82140 ASSAY OF AMMONIA: CPT

## 2024-09-28 PROCEDURE — 84484 ASSAY OF TROPONIN QUANT: CPT

## 2024-09-28 RX ORDER — BISACODYL 5 MG/1
5 TABLET, DELAYED RELEASE ORAL DAILY PRN
Status: DISCONTINUED | OUTPATIENT
Start: 2024-09-28 | End: 2024-10-03 | Stop reason: HOSPADM

## 2024-09-28 RX ORDER — IBUPROFEN 600 MG/1
1 TABLET ORAL
Status: DISCONTINUED | OUTPATIENT
Start: 2024-09-28 | End: 2024-10-03 | Stop reason: HOSPADM

## 2024-09-28 RX ORDER — INSULIN LISPRO 100 [IU]/ML
2-9 INJECTION, SOLUTION INTRAVENOUS; SUBCUTANEOUS
Status: DISCONTINUED | OUTPATIENT
Start: 2024-09-29 | End: 2024-10-03 | Stop reason: HOSPADM

## 2024-09-28 RX ORDER — POLYETHYLENE GLYCOL 3350 17 G/17G
17 POWDER, FOR SOLUTION ORAL DAILY PRN
Status: DISCONTINUED | OUTPATIENT
Start: 2024-09-28 | End: 2024-10-03 | Stop reason: HOSPADM

## 2024-09-28 RX ORDER — DEXTROSE MONOHYDRATE 25 G/50ML
25 INJECTION, SOLUTION INTRAVENOUS
Status: DISCONTINUED | OUTPATIENT
Start: 2024-09-28 | End: 2024-10-03 | Stop reason: HOSPADM

## 2024-09-28 RX ORDER — SODIUM CHLORIDE 450 MG/100ML
100 INJECTION, SOLUTION INTRAVENOUS CONTINUOUS
Status: DISCONTINUED | OUTPATIENT
Start: 2024-09-29 | End: 2024-09-29

## 2024-09-28 RX ORDER — SODIUM CHLORIDE 0.9 % (FLUSH) 0.9 %
10 SYRINGE (ML) INJECTION EVERY 12 HOURS SCHEDULED
Status: DISCONTINUED | OUTPATIENT
Start: 2024-09-29 | End: 2024-10-03 | Stop reason: HOSPADM

## 2024-09-28 RX ORDER — BISACODYL 10 MG
10 SUPPOSITORY, RECTAL RECTAL DAILY PRN
Status: DISCONTINUED | OUTPATIENT
Start: 2024-09-28 | End: 2024-10-03 | Stop reason: HOSPADM

## 2024-09-28 RX ORDER — LACTULOSE 10 G/15ML
20 SOLUTION ORAL 4 TIMES DAILY
Status: DISCONTINUED | OUTPATIENT
Start: 2024-09-29 | End: 2024-10-03 | Stop reason: HOSPADM

## 2024-09-28 RX ORDER — SODIUM CHLORIDE 0.9 % (FLUSH) 0.9 %
10 SYRINGE (ML) INJECTION AS NEEDED
Status: DISCONTINUED | OUTPATIENT
Start: 2024-09-28 | End: 2024-10-03 | Stop reason: HOSPADM

## 2024-09-28 RX ORDER — SODIUM CHLORIDE 9 MG/ML
40 INJECTION, SOLUTION INTRAVENOUS AS NEEDED
Status: DISCONTINUED | OUTPATIENT
Start: 2024-09-28 | End: 2024-10-03 | Stop reason: HOSPADM

## 2024-09-28 RX ORDER — LISINOPRIL 5 MG/1
5 TABLET ORAL DAILY
COMMUNITY
End: 2024-10-03 | Stop reason: HOSPADM

## 2024-09-28 RX ORDER — LACTULOSE 10 G/15ML
30 SOLUTION ORAL ONCE
Status: COMPLETED | OUTPATIENT
Start: 2024-09-28 | End: 2024-09-28

## 2024-09-28 RX ORDER — ONDANSETRON 2 MG/ML
4 INJECTION INTRAMUSCULAR; INTRAVENOUS EVERY 6 HOURS PRN
Status: DISCONTINUED | OUTPATIENT
Start: 2024-09-28 | End: 2024-10-03 | Stop reason: HOSPADM

## 2024-09-28 RX ORDER — AMOXICILLIN 250 MG
2 CAPSULE ORAL 2 TIMES DAILY PRN
Status: DISCONTINUED | OUTPATIENT
Start: 2024-09-28 | End: 2024-10-03 | Stop reason: HOSPADM

## 2024-09-28 RX ORDER — NICOTINE POLACRILEX 4 MG
15 LOZENGE BUCCAL
Status: DISCONTINUED | OUTPATIENT
Start: 2024-09-28 | End: 2024-10-03 | Stop reason: HOSPADM

## 2024-09-28 RX ADMIN — RIFAXIMIN 550 MG: 550 TABLET ORAL at 21:48

## 2024-09-28 RX ADMIN — INSULIN GLARGINE 15 UNITS: 100 INJECTION, SOLUTION SUBCUTANEOUS at 23:57

## 2024-09-28 RX ADMIN — LACTULOSE 20 G: 10 SOLUTION ORAL at 23:57

## 2024-09-28 RX ADMIN — LACTULOSE 30 G: 10 SOLUTION ORAL at 18:19

## 2024-09-28 NOTE — ED PROVIDER NOTES
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 medical records or to obtain history from someone other than the patient: yes  Discuss the patient with other providers: yes (21:39 EDT      I discussed the case with the hospitalist.  We have discussed the patient's presenting symptoms, laboratory values, imaging and condition at the time of admission.  They will evaluate the patient in the emergency room and admit the patient to the hospital)             Social Determinants of Health:    Patient is a nursing home/assisted living resident and has reliable access to care.      Disposition and Care Coordination:    Admit:   Through independent evaluation of the patient's history, physical, and imperical data, the patient meets criteria for inpatient admission to the hospital.        Final diagnoses:   Hepatic encephalopathy   Chronic kidney disease, unspecified CKD stage   Chronic anemia   Chronic idiopathic thrombocytopenia        ED Disposition       ED Disposition   Decision to Admit    Condition   --    Comment   Level of Care: Remote Telemetry [26]   Diagnosis: Hepatic encephalopathy [572.2.ICD-9-CM]   Admitting  Physician: EVELYN HERCULES [[id number]]   Certification: I Certify That Inpatient Hospital Services Are Medically Necessary For Greater Than 2 Midnights                 This medical record created using voice recognition software.             Celestino Martinez DO  10/01/24 0322

## 2024-09-29 PROBLEM — N17.9 ACUTE RENAL FAILURE: Status: ACTIVE | Noted: 2024-09-29

## 2024-09-29 PROBLEM — N18.9 CHRONIC KIDNEY DISEASE: Status: ACTIVE | Noted: 2024-09-29

## 2024-09-29 LAB
AMMONIA BLD-SCNC: 126 UMOL/L (ref 16–60)
ANION GAP SERPL CALCULATED.3IONS-SCNC: 9.6 MMOL/L (ref 5–15)
BACTERIA UR QL AUTO: NORMAL /HPF
BILIRUB UR QL STRIP: NEGATIVE
BUN SERPL-MCNC: 44 MG/DL (ref 6–20)
BUN/CREAT SERPL: 21.2 (ref 7–25)
CALCIUM SPEC-SCNC: 9 MG/DL (ref 8.6–10.5)
CHLORIDE SERPL-SCNC: 104 MMOL/L (ref 98–107)
CLARITY UR: CLEAR
CO2 SERPL-SCNC: 20.4 MMOL/L (ref 22–29)
COLOR UR: YELLOW
CREAT SERPL-MCNC: 2.08 MG/DL (ref 0.76–1.27)
D-LACTATE SERPL-SCNC: 1.8 MMOL/L (ref 0.5–2)
DEPRECATED RDW RBC AUTO: 49.2 FL (ref 37–54)
EGFRCR SERPLBLD CKD-EPI 2021: 36.2 ML/MIN/1.73
ERYTHROCYTE [DISTWIDTH] IN BLOOD BY AUTOMATED COUNT: 16.6 % (ref 12.3–15.4)
GLUCOSE BLDC GLUCOMTR-MCNC: 157 MG/DL (ref 70–99)
GLUCOSE BLDC GLUCOMTR-MCNC: 173 MG/DL (ref 70–99)
GLUCOSE BLDC GLUCOMTR-MCNC: 195 MG/DL (ref 70–99)
GLUCOSE BLDC GLUCOMTR-MCNC: 221 MG/DL (ref 70–99)
GLUCOSE SERPL-MCNC: 210 MG/DL (ref 65–99)
GLUCOSE UR STRIP-MCNC: NEGATIVE MG/DL
HCT VFR BLD AUTO: 23.2 % (ref 37.5–51)
HGB BLD-MCNC: 7.2 G/DL (ref 13–17.7)
HGB UR QL STRIP.AUTO: NEGATIVE
HYALINE CASTS UR QL AUTO: NORMAL /LPF
IRON 24H UR-MRATE: 34 MCG/DL (ref 59–158)
IRON SATN MFR SERPL: 9 % (ref 20–50)
KETONES UR QL STRIP: NEGATIVE
LEUKOCYTE ESTERASE UR QL STRIP.AUTO: ABNORMAL
MCH RBC QN AUTO: 25.4 PG (ref 26.6–33)
MCHC RBC AUTO-ENTMCNC: 31 G/DL (ref 31.5–35.7)
MCV RBC AUTO: 81.7 FL (ref 79–97)
NITRITE UR QL STRIP: NEGATIVE
PH UR STRIP.AUTO: 6 [PH] (ref 5–8)
PLATELET # BLD AUTO: 62 10*3/MM3 (ref 140–450)
PMV BLD AUTO: 13.3 FL (ref 6–12)
POTASSIUM SERPL-SCNC: 5 MMOL/L (ref 3.5–5.2)
PROT UR QL STRIP: NEGATIVE
RBC # BLD AUTO: 2.84 10*6/MM3 (ref 4.14–5.8)
RBC # UR STRIP: NORMAL /HPF
REF LAB TEST METHOD: NORMAL
SODIUM SERPL-SCNC: 134 MMOL/L (ref 136–145)
SP GR UR STRIP: 1.01 (ref 1–1.03)
SQUAMOUS #/AREA URNS HPF: NORMAL /HPF
TIBC SERPL-MCNC: 395 MCG/DL (ref 298–536)
TRANSFERRIN SERPL-MCNC: 265 MG/DL (ref 200–360)
UROBILINOGEN UR QL STRIP: ABNORMAL
WBC # UR STRIP: NORMAL /HPF
WBC NRBC COR # BLD AUTO: 3.93 10*3/MM3 (ref 3.4–10.8)

## 2024-09-29 PROCEDURE — 63710000001 INSULIN LISPRO (HUMAN) PER 5 UNITS: Performed by: FAMILY MEDICINE

## 2024-09-29 PROCEDURE — 81001 URINALYSIS AUTO W/SCOPE: CPT | Performed by: EMERGENCY MEDICINE

## 2024-09-29 PROCEDURE — 63710000001 INSULIN GLARGINE PER 5 UNITS: Performed by: STUDENT IN AN ORGANIZED HEALTH CARE EDUCATION/TRAINING PROGRAM

## 2024-09-29 PROCEDURE — 84466 ASSAY OF TRANSFERRIN: CPT | Performed by: STUDENT IN AN ORGANIZED HEALTH CARE EDUCATION/TRAINING PROGRAM

## 2024-09-29 PROCEDURE — 82948 REAGENT STRIP/BLOOD GLUCOSE: CPT

## 2024-09-29 PROCEDURE — 80048 BASIC METABOLIC PNL TOTAL CA: CPT | Performed by: FAMILY MEDICINE

## 2024-09-29 PROCEDURE — 82140 ASSAY OF AMMONIA: CPT | Performed by: STUDENT IN AN ORGANIZED HEALTH CARE EDUCATION/TRAINING PROGRAM

## 2024-09-29 PROCEDURE — 85027 COMPLETE CBC AUTOMATED: CPT | Performed by: FAMILY MEDICINE

## 2024-09-29 PROCEDURE — 83605 ASSAY OF LACTIC ACID: CPT

## 2024-09-29 PROCEDURE — 25010000002 NA FERRIC GLUC CPLX PER 12.5 MG: Performed by: INTERNAL MEDICINE

## 2024-09-29 PROCEDURE — 25010000002 ONDANSETRON PER 1 MG: Performed by: FAMILY MEDICINE

## 2024-09-29 PROCEDURE — 82948 REAGENT STRIP/BLOOD GLUCOSE: CPT | Performed by: FAMILY MEDICINE

## 2024-09-29 PROCEDURE — 25810000003 SODIUM CHLORIDE 0.9 % SOLUTION: Performed by: INTERNAL MEDICINE

## 2024-09-29 PROCEDURE — 83540 ASSAY OF IRON: CPT | Performed by: STUDENT IN AN ORGANIZED HEALTH CARE EDUCATION/TRAINING PROGRAM

## 2024-09-29 PROCEDURE — 94799 UNLISTED PULMONARY SVC/PX: CPT

## 2024-09-29 PROCEDURE — 99222 1ST HOSP IP/OBS MODERATE 55: CPT | Performed by: FAMILY MEDICINE

## 2024-09-29 PROCEDURE — 36415 COLL VENOUS BLD VENIPUNCTURE: CPT

## 2024-09-29 RX ORDER — SODIUM CHLORIDE 9 MG/ML
100 INJECTION, SOLUTION INTRAVENOUS CONTINUOUS
Status: DISCONTINUED | OUTPATIENT
Start: 2024-09-29 | End: 2024-10-01

## 2024-09-29 RX ORDER — OXYCODONE HYDROCHLORIDE 5 MG/1
5 TABLET ORAL EVERY 6 HOURS PRN
Status: DISCONTINUED | OUTPATIENT
Start: 2024-09-29 | End: 2024-10-03 | Stop reason: HOSPADM

## 2024-09-29 RX ADMIN — SODIUM CHLORIDE 100 ML/HR: 9 INJECTION, SOLUTION INTRAVENOUS at 12:10

## 2024-09-29 RX ADMIN — INSULIN LISPRO 4 UNITS: 100 INJECTION, SOLUTION INTRAVENOUS; SUBCUTANEOUS at 21:16

## 2024-09-29 RX ADMIN — INSULIN GLARGINE 15 UNITS: 100 INJECTION, SOLUTION SUBCUTANEOUS at 21:16

## 2024-09-29 RX ADMIN — SODIUM CHLORIDE 250 MG: 9 INJECTION, SOLUTION INTRAVENOUS at 12:09

## 2024-09-29 RX ADMIN — RIFAXIMIN 550 MG: 550 TABLET ORAL at 07:26

## 2024-09-29 RX ADMIN — SODIUM CHLORIDE 100 ML/HR: 4.5 INJECTION, SOLUTION INTRAVENOUS at 00:04

## 2024-09-29 RX ADMIN — Medication 10 ML: at 21:17

## 2024-09-29 RX ADMIN — ONDANSETRON 4 MG: 2 INJECTION INTRAMUSCULAR; INTRAVENOUS at 17:33

## 2024-09-29 RX ADMIN — INSULIN GLARGINE 15 UNITS: 100 INJECTION, SOLUTION SUBCUTANEOUS at 12:09

## 2024-09-29 RX ADMIN — LACTULOSE 20 G: 10 SOLUTION ORAL at 21:16

## 2024-09-29 RX ADMIN — INSULIN LISPRO 2 UNITS: 100 INJECTION, SOLUTION INTRAVENOUS; SUBCUTANEOUS at 08:02

## 2024-09-29 RX ADMIN — Medication 10 ML: at 12:10

## 2024-09-29 RX ADMIN — Medication 10 ML: at 00:04

## 2024-09-29 RX ADMIN — LACTULOSE 20 G: 10 SOLUTION ORAL at 12:09

## 2024-09-29 RX ADMIN — INSULIN LISPRO 2 UNITS: 100 INJECTION, SOLUTION INTRAVENOUS; SUBCUTANEOUS at 17:33

## 2024-09-29 RX ADMIN — INSULIN LISPRO 2 UNITS: 100 INJECTION, SOLUTION INTRAVENOUS; SUBCUTANEOUS at 12:09

## 2024-09-29 RX ADMIN — OXYCODONE HYDROCHLORIDE 5 MG: 5 TABLET ORAL at 16:46

## 2024-09-29 RX ADMIN — RIFAXIMIN 550 MG: 550 TABLET ORAL at 21:16

## 2024-09-29 RX ADMIN — LACTULOSE 20 G: 10 SOLUTION ORAL at 17:33

## 2024-09-29 NOTE — PLAN OF CARE
Goal Outcome Evaluation:  Plan of Care Reviewed With: patient      This nurse took over pt.'s care at approximately 0100. Pt alert but confused to place, time, and situation; appears to think like a child. Anxious at times but after snack was able to calm for few hours and rest. Pt had #20 IV in his right hand but it was observed to be out when rounding. Unable to place a new IV, day shift nurse alerted and will try. Much emotional support needed and shown to pt.
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
## 2024-09-29 NOTE — PLAN OF CARE
Goal Outcome Evaluation:      Patient has not yet worn BiPAP unit.  Unit in room on [facility]by for use at bedtime tonight.

## 2024-09-29 NOTE — PLAN OF CARE
Goal Outcome Evaluation:  Plan of Care Reviewed With: patient        Progress: no change  Outcome Evaluation: pt aox2 on room air. complaints of pain in abdomen, medicated PRN per the MAR. one complaint of nausea, medicated per the MAR. BG monitored as ordered. wound consult placed for skin rash. IV Fluids going NS at 100mL/hr. pt frequently reoriented and shown how to use call light. bed alarm in place at this time, pt standby assist to bathroom. no complaints at this time

## 2024-09-30 ENCOUNTER — APPOINTMENT (OUTPATIENT)
Dept: CT IMAGING | Facility: HOSPITAL | Age: 59
DRG: 442 | End: 2024-09-30
Payer: MEDICAID

## 2024-09-30 LAB
ABO GROUP BLD: NORMAL
AMMONIA BLD-SCNC: 100 UMOL/L (ref 16–60)
ANION GAP SERPL CALCULATED.3IONS-SCNC: 9.8 MMOL/L (ref 5–15)
BASOPHILS # BLD AUTO: 0.04 10*3/MM3 (ref 0–0.2)
BASOPHILS NFR BLD AUTO: 1 % (ref 0–1.5)
BLD GP AB SCN SERPL QL: NEGATIVE
BUN SERPL-MCNC: 29 MG/DL (ref 6–20)
BUN/CREAT SERPL: 21.8 (ref 7–25)
CALCIUM SPEC-SCNC: 8.7 MG/DL (ref 8.6–10.5)
CHLORIDE SERPL-SCNC: 107 MMOL/L (ref 98–107)
CO2 SERPL-SCNC: 18.2 MMOL/L (ref 22–29)
CREAT SERPL-MCNC: 1.33 MG/DL (ref 0.76–1.27)
DEPRECATED RDW RBC AUTO: 51.2 FL (ref 37–54)
EGFRCR SERPLBLD CKD-EPI 2021: 62 ML/MIN/1.73
EOSINOPHIL # BLD AUTO: 0.11 10*3/MM3 (ref 0–0.4)
EOSINOPHIL NFR BLD AUTO: 2.6 % (ref 0.3–6.2)
ERYTHROCYTE [DISTWIDTH] IN BLOOD BY AUTOMATED COUNT: 16.6 % (ref 12.3–15.4)
FERRITIN SERPL-MCNC: 72.82 NG/ML (ref 30–400)
GLUCOSE BLDC GLUCOMTR-MCNC: 128 MG/DL (ref 70–99)
GLUCOSE BLDC GLUCOMTR-MCNC: 156 MG/DL (ref 70–99)
GLUCOSE BLDC GLUCOMTR-MCNC: 158 MG/DL (ref 70–99)
GLUCOSE BLDC GLUCOMTR-MCNC: 210 MG/DL (ref 70–99)
GLUCOSE SERPL-MCNC: 129 MG/DL (ref 65–99)
HCT VFR BLD AUTO: 24.4 % (ref 37.5–51)
HGB BLD-MCNC: 7.1 G/DL (ref 13–17.7)
IMM GRANULOCYTES # BLD AUTO: 0.01 10*3/MM3 (ref 0–0.05)
IMM GRANULOCYTES NFR BLD AUTO: 0.2 % (ref 0–0.5)
LYMPHOCYTES # BLD AUTO: 1.1 10*3/MM3 (ref 0.7–3.1)
LYMPHOCYTES NFR BLD AUTO: 26.4 % (ref 19.6–45.3)
MAGNESIUM SERPL-MCNC: 2 MG/DL (ref 1.6–2.6)
MCH RBC QN AUTO: 24.9 PG (ref 26.6–33)
MCHC RBC AUTO-ENTMCNC: 29.1 G/DL (ref 31.5–35.7)
MCV RBC AUTO: 85.6 FL (ref 79–97)
MONOCYTES # BLD AUTO: 0.5 10*3/MM3 (ref 0.1–0.9)
MONOCYTES NFR BLD AUTO: 12 % (ref 5–12)
NEUTROPHILS NFR BLD AUTO: 2.41 10*3/MM3 (ref 1.7–7)
NEUTROPHILS NFR BLD AUTO: 57.8 % (ref 42.7–76)
NRBC BLD AUTO-RTO: 0 /100 WBC (ref 0–0.2)
PHOSPHATE SERPL-MCNC: 3.7 MG/DL (ref 2.5–4.5)
PLATELET # BLD AUTO: 76 10*3/MM3 (ref 140–450)
PMV BLD AUTO: 12.2 FL (ref 6–12)
POTASSIUM SERPL-SCNC: 5 MMOL/L (ref 3.5–5.2)
QT INTERVAL: 406 MS
QTC INTERVAL: 455 MS
RBC # BLD AUTO: 2.85 10*6/MM3 (ref 4.14–5.8)
RH BLD: POSITIVE
SODIUM SERPL-SCNC: 135 MMOL/L (ref 136–145)
SODIUM UR-SCNC: 68 MMOL/L
T&S EXPIRATION DATE: NORMAL
WBC NRBC COR # BLD AUTO: 4.17 10*3/MM3 (ref 3.4–10.8)

## 2024-09-30 PROCEDURE — 84300 ASSAY OF URINE SODIUM: CPT | Performed by: INTERNAL MEDICINE

## 2024-09-30 PROCEDURE — 80048 BASIC METABOLIC PNL TOTAL CA: CPT | Performed by: STUDENT IN AN ORGANIZED HEALTH CARE EDUCATION/TRAINING PROGRAM

## 2024-09-30 PROCEDURE — 74176 CT ABD & PELVIS W/O CONTRAST: CPT

## 2024-09-30 PROCEDURE — 94799 UNLISTED PULMONARY SVC/PX: CPT

## 2024-09-30 PROCEDURE — 86900 BLOOD TYPING SEROLOGIC ABO: CPT | Performed by: STUDENT IN AN ORGANIZED HEALTH CARE EDUCATION/TRAINING PROGRAM

## 2024-09-30 PROCEDURE — 63710000001 INSULIN LISPRO (HUMAN) PER 5 UNITS: Performed by: FAMILY MEDICINE

## 2024-09-30 PROCEDURE — 86850 RBC ANTIBODY SCREEN: CPT | Performed by: STUDENT IN AN ORGANIZED HEALTH CARE EDUCATION/TRAINING PROGRAM

## 2024-09-30 PROCEDURE — 86923 COMPATIBILITY TEST ELECTRIC: CPT

## 2024-09-30 PROCEDURE — 83735 ASSAY OF MAGNESIUM: CPT | Performed by: STUDENT IN AN ORGANIZED HEALTH CARE EDUCATION/TRAINING PROGRAM

## 2024-09-30 PROCEDURE — 86901 BLOOD TYPING SEROLOGIC RH(D): CPT | Performed by: STUDENT IN AN ORGANIZED HEALTH CARE EDUCATION/TRAINING PROGRAM

## 2024-09-30 PROCEDURE — 97161 PT EVAL LOW COMPLEX 20 MIN: CPT

## 2024-09-30 PROCEDURE — 82140 ASSAY OF AMMONIA: CPT | Performed by: STUDENT IN AN ORGANIZED HEALTH CARE EDUCATION/TRAINING PROGRAM

## 2024-09-30 PROCEDURE — 25810000003 SODIUM CHLORIDE 0.9 % SOLUTION: Performed by: INTERNAL MEDICINE

## 2024-09-30 PROCEDURE — 84100 ASSAY OF PHOSPHORUS: CPT | Performed by: STUDENT IN AN ORGANIZED HEALTH CARE EDUCATION/TRAINING PROGRAM

## 2024-09-30 PROCEDURE — 99232 SBSQ HOSP IP/OBS MODERATE 35: CPT | Performed by: STUDENT IN AN ORGANIZED HEALTH CARE EDUCATION/TRAINING PROGRAM

## 2024-09-30 PROCEDURE — 82948 REAGENT STRIP/BLOOD GLUCOSE: CPT | Performed by: FAMILY MEDICINE

## 2024-09-30 PROCEDURE — 25010000002 ONDANSETRON PER 1 MG: Performed by: FAMILY MEDICINE

## 2024-09-30 PROCEDURE — 86900 BLOOD TYPING SEROLOGIC ABO: CPT

## 2024-09-30 PROCEDURE — 85025 COMPLETE CBC W/AUTO DIFF WBC: CPT | Performed by: STUDENT IN AN ORGANIZED HEALTH CARE EDUCATION/TRAINING PROGRAM

## 2024-09-30 PROCEDURE — 36430 TRANSFUSION BLD/BLD COMPNT: CPT

## 2024-09-30 PROCEDURE — 82948 REAGENT STRIP/BLOOD GLUCOSE: CPT

## 2024-09-30 PROCEDURE — 63710000001 INSULIN GLARGINE PER 5 UNITS: Performed by: STUDENT IN AN ORGANIZED HEALTH CARE EDUCATION/TRAINING PROGRAM

## 2024-09-30 PROCEDURE — 82728 ASSAY OF FERRITIN: CPT | Performed by: STUDENT IN AN ORGANIZED HEALTH CARE EDUCATION/TRAINING PROGRAM

## 2024-09-30 PROCEDURE — P9016 RBC LEUKOCYTES REDUCED: HCPCS

## 2024-09-30 PROCEDURE — 25010000002 NA FERRIC GLUC CPLX PER 12.5 MG: Performed by: INTERNAL MEDICINE

## 2024-09-30 RX ORDER — MICONAZOLE NITRATE 20 MG/G
1 CREAM TOPICAL
Status: DISCONTINUED | OUTPATIENT
Start: 2024-09-30 | End: 2024-10-03 | Stop reason: HOSPADM

## 2024-09-30 RX ADMIN — ONDANSETRON 4 MG: 2 INJECTION INTRAMUSCULAR; INTRAVENOUS at 09:42

## 2024-09-30 RX ADMIN — INSULIN GLARGINE 15 UNITS: 100 INJECTION, SOLUTION SUBCUTANEOUS at 21:45

## 2024-09-30 RX ADMIN — OXYCODONE HYDROCHLORIDE 5 MG: 5 TABLET ORAL at 16:45

## 2024-09-30 RX ADMIN — SODIUM CHLORIDE 250 MG: 9 INJECTION, SOLUTION INTRAVENOUS at 08:37

## 2024-09-30 RX ADMIN — LACTULOSE 20 G: 10 SOLUTION ORAL at 08:37

## 2024-09-30 RX ADMIN — INSULIN GLARGINE 15 UNITS: 100 INJECTION, SOLUTION SUBCUTANEOUS at 08:38

## 2024-09-30 RX ADMIN — INSULIN LISPRO 4 UNITS: 100 INJECTION, SOLUTION INTRAVENOUS; SUBCUTANEOUS at 17:16

## 2024-09-30 RX ADMIN — RIFAXIMIN 550 MG: 550 TABLET ORAL at 08:37

## 2024-09-30 RX ADMIN — SODIUM CHLORIDE 100 ML/HR: 9 INJECTION, SOLUTION INTRAVENOUS at 12:52

## 2024-09-30 RX ADMIN — OXYCODONE HYDROCHLORIDE 5 MG: 5 TABLET ORAL at 23:53

## 2024-09-30 RX ADMIN — Medication 10 ML: at 08:38

## 2024-09-30 RX ADMIN — MICONAZOLE NITRATE 1 APPLICATION: 2 CREAM TOPICAL at 21:48

## 2024-09-30 RX ADMIN — INSULIN LISPRO 2 UNITS: 100 INJECTION, SOLUTION INTRAVENOUS; SUBCUTANEOUS at 11:58

## 2024-09-30 RX ADMIN — ONDANSETRON 4 MG: 2 INJECTION INTRAMUSCULAR; INTRAVENOUS at 16:45

## 2024-09-30 RX ADMIN — Medication 10 ML: at 21:46

## 2024-09-30 RX ADMIN — LACTULOSE 20 G: 10 SOLUTION ORAL at 11:58

## 2024-09-30 RX ADMIN — LACTULOSE 20 G: 10 SOLUTION ORAL at 17:16

## 2024-09-30 RX ADMIN — OXYCODONE HYDROCHLORIDE 5 MG: 5 TABLET ORAL at 09:46

## 2024-09-30 RX ADMIN — RIFAXIMIN 550 MG: 550 TABLET ORAL at 21:46

## 2024-09-30 RX ADMIN — ONDANSETRON 4 MG: 2 INJECTION INTRAMUSCULAR; INTRAVENOUS at 23:53

## 2024-09-30 RX ADMIN — INSULIN LISPRO 2 UNITS: 100 INJECTION, SOLUTION INTRAVENOUS; SUBCUTANEOUS at 21:46

## 2024-09-30 RX ADMIN — LACTULOSE 20 G: 10 SOLUTION ORAL at 21:46
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
## 2024-09-30 NOTE — PROGRESS NOTES
[facility]     Progress Note    Patient Name: Nic Nicolas  : 1965  MRN: [id number]  Primary Care Physician:  Sheldon Carcamo MD  Date of admission: 2024    Subjective patient complains of being weak but otherwise she has no acute issues his mental status is back to normal  Blood pressure is stable  IVELISSE resolving    Review of Systems  All review of systems are negative except as mentioned in subjective complaints.    Objective   Objective     Vitals:   Temp:  [97.3 °F (36.3 °C)-98.7 °F (37.1 °C)] 98.7 °F (37.1 °C)  Heart Rate:  [78-88] 88  Resp:  [18] 18  BP: (120-147)/(46-59) 128/58  Physical Exam    Constitutional: Awake, alert responsive, conversant, no obvious distress              Psychiatric:  Appropriate affect, cooperative   Neurologic:  Awake alert ,oriented x 3, strength symmetric in all extremities, Cranial Nerves grossly intact to confrontation, speech clear   Eyes:   PERRLA, sclerae anicteric, no conjunctival injection   HEENT:  Moist mucous membranes, no nasal or eye discharge, no throat congestion   Neck:   Supple, no thyromegaly, no lymphadenopathy, trachea midline, no elevated JVD   Respiratory:  Clear to auscultation bilaterally, nonlabored respirations    Cardiovascular: RRR, no murmurs, rubs, or gallops, palpable pedal pulses bilaterally, No bilateral ankle edema   Gastrointestinal: Positive bowel sounds, soft, nontender, nondistended, no organomegaly   Musculoskeletal:  No clubbing or cyanosis to extremities,muscle wasting, joint swelling, muscle weakness             Skin:                      No rashes, bruising, skin ulcers, petechiae or ecchymosis    Result Review    Result Review:  I have personally reviewed the results from the time of this admission to 2024 09:11 EDT and agree with these findings:  []  Laboratory  []  Microbiology  []  Radiology  []  EKG/Telemetry   []  Cardiology/Vascular   []  Pathology  []  Old records  []  Other:    Results from last 7  days   Lab Units 09/30/24  0531 09/29/24  0529 09/28/24  1619   WBC 10*3/mm3 4.17 3.93 2.26*   HEMOGLOBIN g/dL 7.1* 7.2* 7.3*   PLATELETS 10*3/mm3 76* 62* 56*     Results from last 7 days   Lab Units 09/30/24  0531 09/29/24  0529 09/28/24  1929 09/28/24  1619   SODIUM mmol/L 135* 134*  --  131*   POTASSIUM mmol/L 5.0 5.0  --  5.7*   CHLORIDE mmol/L 107 104  --  101   CO2 mmol/L 18.2* 20.4*  --  21.5*   ANION GAP mmol/L 9.8 9.6  --  8.5   BUN mg/dL 29* 44*  --  45*   CREATININE mg/dL 1.33* 2.08* 2.01* 2.53*   GLUCOSE mg/dL 129* 210*  --  292*       Assessment & Plan   Assessment / Plan       Active Hospital Problems:    Active Hospital Problems    Diagnosis  POA    **Hepatic encephalopathy [K76.82]  Yes    Acute renal failure [N17.9]  Unknown     Secondary intravascular volume contraction      Chronic anemia [D64.9]  Yes     Start IV iron as patient is iron deficient secondary to most likely GI bleeding      Sleep apnea [G47.30]  Yes    Liver cirrhosis secondary to ROMO (nonalcoholic steatohepatitis) [K75.81, K74.60]  Yes    Systolic congestive heart failure [I50.20]  Yes    High blood pressure [I10]  Yes    Type 2 diabetes mellitus with hyperglycemia [E11.65]  Yes       Plan:   DC IV fluids  Patient is clinically stable  Continue to hold diuretics today  Discharge planning per primary team       Electronically signed by Calos Gates MD, 09/30/24, 9:11 AM EDT.

## 2024-09-30 NOTE — PLAN OF CARE
Goal Outcome Evaluation:      Patient off bipap this morning and on room air. No increase in work of breathing noted.

## 2024-09-30 NOTE — PROGRESS NOTES
[facility]   Hospitalist Progress Note  Date: 2024  Patient Name: Nic Nicolas  : 1965  MRN: [id number]  Date of admission: 2024  Room/Bed: [facility]      Subjective   Subjective     Chief Complaint: Altered mental status    Summary:Nic Nicolas is a 59 y.o. male with past medical history of cirrhosis, diabetes, hyperlipidemia, hypertension, CHF, BREE, CKD, TBI, asthma, morbid obesity, and GERD presented to the ED from nursing home for evaluation of altered mental status.  Patient was discharged from this hospital 5 days ago with the same complaints and symptoms were he had likely not been getting his lactulose and rifaximin at the nursing home.  Since his discharge from here patient began to decline once again so he was brought back to the ED for further evaluation.  There is some confusion of whether or not the area was corrected of him not getting his lactulose and rifaximin.  In the ED patient's vitals were all within normal limits on arrival.  Labs showed lactic acidosis with pancytopenia and ammonia of 190.  X-ray was negative for any acute findings.  Patient was altered and confused when seen but was able to swallow his lactulose.  Patient was disoriented when seen.  Patient admitted for further evaluation. Mentation has significantly improved. Lactulose and Rifaximin continued. Nephrology consulted for IVELISSE. There is concern for patient not receiving Lactulose while in the rehab, will reach out to  to see if that's actually true as he has been having multiple admission for hyperammonemia and has been compliant with medications while in the hospital. He stated he never has bowel movement daily while in rehab and concerned about not getting his medications. Nephrology following    Interval Followup: No acute events overnight. Laying in bed, not in acute distress. Hv 7.1 today, no active signs of bleeding. Transfusing 1 units of PRBC; also found to have MEGHAN and receiving  IV Iron.    Review of Systems    All systems reviewed and negative except for what is outlined above.      Objective   Objective     Vitals:   Temp:  [97.3 °F (36.3 °C)-98.6 °F (37 °C)] 98.2 °F (36.8 °C)  Heart Rate:  [78-84] 84  Resp:  [18] 18  BP: (120-147)/(46-59) 147/52    Physical Exam   General: Awake, alert, NAD  HENT: NCAT, MMM  Cardiovascular: RRR, no murmurs   Pulmonary: CTA bilaterally; no wheezes; no conversational dyspnea  Gastrointestinal: S/distended/NT, +BS  Neuro: alert, awake, orientedx3; speech clear; no tremor      Result Review    Result Review:  I have personally reviewed these results:  [x]  Laboratory      Lab 09/30/24 0531 09/29/24 0529 09/29/24 0210 09/28/24 2240 09/28/24 1929 09/28/24  1619   WBC 4.17 3.93  --   --   --  2.26*   HEMOGLOBIN 7.1* 7.2*  --   --   --  7.3*   HEMATOCRIT 24.4* 23.2*  --   --   --  24.0*   PLATELETS 76* 62*  --   --   --  56*   NEUTROS ABS 2.41  --   --   --   --  1.41*   IMMATURE GRANS (ABS) 0.01  --   --   --   --  0.00   LYMPHS ABS 1.10  --   --   --   --  0.53*   MONOS ABS 0.50  --   --   --   --  0.26   EOS ABS 0.11  --   --   --   --  0.04   MCV 85.6 81.7  --   --   --  82.5   LACTATE  --   --  1.8 2.2* 2.7*  --          Lab 09/30/24 0531 09/29/24 0529 09/28/24 1929 09/28/24  1619   SODIUM 135* 134*  --  131*   POTASSIUM 5.0 5.0  --  5.7*   CHLORIDE 107 104  --  101   CO2 18.2* 20.4*  --  21.5*   ANION GAP 9.8 9.6  --  8.5   BUN 29* 44*  --  45*   CREATININE 1.33* 2.08* 2.01* 2.53*   EGFR 62.0 36.2*  --  28.6*   GLUCOSE 129* 210*  --  292*   CALCIUM 8.7 9.0  --  8.9   MAGNESIUM 2.0  --   --  2.3   PHOSPHORUS 3.7  --   --   --          Lab 09/28/24  1619   TOTAL PROTEIN 5.6*   ALBUMIN 2.9*   GLOBULIN 2.7   ALT (SGPT) 20   AST (SGOT) 31   BILIRUBIN 1.2   ALK PHOS 91         Lab 09/28/24  1619   HSTROP T 42*             Lab 09/29/24  0529   IRON 34*   IRON SATURATION (TSAT) 9*   TIBC 395   TRANSFERRIN 265         Lab 09/28/24  1929   FIO2 21     Brief  Urine Lab Results  (Last result in the past 365 days)        Color   Clarity   Blood   Leuk Est   Nitrite   Protein   CREAT   Urine HCG        09/29/24 0419 Yellow   Clear   Negative   Trace   Negative   Negative                 [x]  Microbiology   Microbiology Results (last 10 days)       Procedure Component Value - Date/Time    COVID-19, FLU A/B, RSV PCR 1 HR TAT - Swab, Nasopharynx [[id number]]  (Normal) Collected: 09/21/24 0311    Lab Status: Final result Specimen: Swab from Nasopharynx Updated: 09/21/24 0454     COVID19 Not Detected     Influenza A PCR Not Detected     Influenza B PCR Not Detected     RSV, PCR Not Detected    Narrative:      Fact sheet for providers: https://www.fda.gov/media/119454/download    Fact sheet for patients: https://www.fda.gov/media/659451/download    Test performed by PCR.    Blood Culture - Blood, Arm, Right [[id number]]  (Normal) Collected: 09/21/24 0300    Lab Status: Final result Specimen: Blood from Arm, Right Updated: 09/26/24 0315     Blood Culture No growth at 5 days    Blood Culture - Blood, Arm, Right [[id number]]  (Normal) Collected: 09/21/24 0245    Lab Status: Final result Specimen: Blood from Arm, Right Updated: 09/26/24 0315     Blood Culture No growth at 5 days          [x]  Radiology  XR Chest 1 View    Result Date: 9/28/2024  Impression: No acute infiltrate Electronically Signed: Fili Jones MD  9/28/2024 4:20 PM EDT  Workstation ID: [id number]   []  EKG/Telemetry   []  Cardiology/Vascular   []  Pathology  []  Old records  []  Other:    Assessment & Plan   Assessment / Plan     Assessment:  Hepatic encephalopathy, resolved  Anemia  Cirrhosis  Hypertension  Diabetes Mellitus  CHF  BREE on Bipap  Pancytopenia  Morbid obesity    Plan:  Patient currently being managed in medicine service.  Continue lactulose and Rifaximin.  On insulin glargine and ISS.  Anemia with Hb 7.1 today; transfusing 1 unit of PRBC. No active signs of bleeding.  Iron deficiency anemia; receiving IV  Iron.  Thrombocytopenia; likely in the setting of underlying cirrhosis  Nephrology following the patient; IVELISSE improving.  Holding IV lasix  IV fluids discontinue.  Concern for patient not receiving his lactulose while in rehab due to frequent admissions.  aware.  Will obtain AM labs including ammonia level.  Clinical course to determine disposition.     Discussed with RN.    VTE Prophylaxis:  Mechanical VTE prophylaxis orders are present.        CODE STATUS:   Level Of Support Discussed With: Patient  Code Status (Patient has no pulse and is not breathing): CPR (Attempt to Resuscitate)  Medical Interventions (Patient has pulse or is breathing): Full Support      Electronically signed by Rinku Gao MD, 09/30/24, 7:38 AM EDT.

## 2024-09-30 NOTE — PLAN OF CARE
Goal Outcome Evaluation:  Plan of Care Reviewed With: patient        Progress: improving PT A&O times 4 able to voice needs and wants pt ambulated to the restroom with standby assist steady gait pt pleasant and cooperative cont POC

## 2024-09-30 NOTE — PLAN OF CARE
Goal Outcome Evaluation:  Plan of Care Reviewed With: patient        Progress: no change  Outcome Evaluation: pt aox2 to self and place. disoriented to situation and time. pt on room air. frequent complaints of pain and nausea, medicated prn per the MAR. 1 unit of blood transfused this shift, pt tolerated well with no signs or symptoms of a reaction. NS @ 100 mL/hr remains running. new skin care orders ordered this shift per wound care. pt resting in bed at this time. BG monitored as ordered. no complaints at this time, bed alarm in place.
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
## 2024-09-30 NOTE — PLAN OF CARE
Goal Outcome Evaluation:              Outcome Evaluation: Patient is resting comfortably on room air at this time with no complaints of SOA. Patient refused to wear the BIPAP thorughout the night. Patient stated he does not need the BIPAP and if he does he will call out for it. Will continue to encourage use of BIPAP as tolerated.

## 2024-09-30 NOTE — PLAN OF CARE
Goal Outcome Evaluation:  Plan of Care Reviewed With: (P) patient           Outcome Evaluation: (P) Pt was able to demonstrate modified independence with bed mobility, transfers, and ambulation. Pt is currently at his baseline and does not need inpatient PT services at this time. Pt can return to nursing home upon hospital discharge.      Anticipated Discharge Disposition (PT): (P) skilled nursing facility

## 2024-10-01 LAB
AMMONIA BLD-SCNC: 56 UMOL/L (ref 16–60)
ANION GAP SERPL CALCULATED.3IONS-SCNC: 7.6 MMOL/L (ref 5–15)
BASOPHILS # BLD AUTO: 0.05 10*3/MM3 (ref 0–0.2)
BASOPHILS NFR BLD AUTO: 1 % (ref 0–1.5)
BH BB BLOOD EXPIRATION DATE: NORMAL
BH BB BLOOD TYPE BARCODE: 6200
BH BB DISPENSE STATUS: NORMAL
BH BB PRODUCT CODE: NORMAL
BH BB UNIT NUMBER: NORMAL
BUN SERPL-MCNC: 20 MG/DL (ref 6–20)
BUN/CREAT SERPL: 15.4 (ref 7–25)
CALCIUM SPEC-SCNC: 9 MG/DL (ref 8.6–10.5)
CHLORIDE SERPL-SCNC: 107 MMOL/L (ref 98–107)
CO2 SERPL-SCNC: 19.4 MMOL/L (ref 22–29)
CREAT SERPL-MCNC: 1.3 MG/DL (ref 0.76–1.27)
CROSSMATCH INTERPRETATION: NORMAL
DEPRECATED RDW RBC AUTO: 48.5 FL (ref 37–54)
EGFRCR SERPLBLD CKD-EPI 2021: 63.7 ML/MIN/1.73
EOSINOPHIL # BLD AUTO: 0.15 10*3/MM3 (ref 0–0.4)
EOSINOPHIL NFR BLD AUTO: 2.9 % (ref 0.3–6.2)
ERYTHROCYTE [DISTWIDTH] IN BLOOD BY AUTOMATED COUNT: 16.3 % (ref 12.3–15.4)
FOLATE SERPL-MCNC: 8.32 NG/ML (ref 4.78–24.2)
GLUCOSE BLDC GLUCOMTR-MCNC: 125 MG/DL (ref 70–99)
GLUCOSE BLDC GLUCOMTR-MCNC: 135 MG/DL (ref 70–99)
GLUCOSE BLDC GLUCOMTR-MCNC: 142 MG/DL (ref 70–99)
GLUCOSE BLDC GLUCOMTR-MCNC: 162 MG/DL (ref 70–99)
GLUCOSE SERPL-MCNC: 168 MG/DL (ref 65–99)
HCT VFR BLD AUTO: 27.3 % (ref 37.5–51)
HGB BLD-MCNC: 8.3 G/DL (ref 13–17.7)
IMM GRANULOCYTES # BLD AUTO: 0.02 10*3/MM3 (ref 0–0.05)
IMM GRANULOCYTES NFR BLD AUTO: 0.4 % (ref 0–0.5)
LYMPHOCYTES # BLD AUTO: 1.03 10*3/MM3 (ref 0.7–3.1)
LYMPHOCYTES NFR BLD AUTO: 20.2 % (ref 19.6–45.3)
MAGNESIUM SERPL-MCNC: 1.8 MG/DL (ref 1.6–2.6)
MCH RBC QN AUTO: 24.8 PG (ref 26.6–33)
MCHC RBC AUTO-ENTMCNC: 30.4 G/DL (ref 31.5–35.7)
MCV RBC AUTO: 81.5 FL (ref 79–97)
MONOCYTES # BLD AUTO: 0.64 10*3/MM3 (ref 0.1–0.9)
MONOCYTES NFR BLD AUTO: 12.5 % (ref 5–12)
NEUTROPHILS NFR BLD AUTO: 3.22 10*3/MM3 (ref 1.7–7)
NEUTROPHILS NFR BLD AUTO: 63 % (ref 42.7–76)
NRBC BLD AUTO-RTO: 0 /100 WBC (ref 0–0.2)
PHOSPHATE SERPL-MCNC: 3.1 MG/DL (ref 2.5–4.5)
PLATELET # BLD AUTO: 75 10*3/MM3 (ref 140–450)
PMV BLD AUTO: 10.4 FL (ref 6–12)
POTASSIUM SERPL-SCNC: 5.3 MMOL/L (ref 3.5–5.2)
RBC # BLD AUTO: 3.35 10*6/MM3 (ref 4.14–5.8)
SODIUM SERPL-SCNC: 134 MMOL/L (ref 136–145)
UNIT  ABO: NORMAL
UNIT  RH: NORMAL
VIT B12 BLD-MCNC: 732 PG/ML (ref 211–946)
WBC NRBC COR # BLD AUTO: 5.11 10*3/MM3 (ref 3.4–10.8)

## 2024-10-01 PROCEDURE — 63710000001 INSULIN LISPRO (HUMAN) PER 5 UNITS: Performed by: FAMILY MEDICINE

## 2024-10-01 PROCEDURE — 85025 COMPLETE CBC W/AUTO DIFF WBC: CPT | Performed by: STUDENT IN AN ORGANIZED HEALTH CARE EDUCATION/TRAINING PROGRAM

## 2024-10-01 PROCEDURE — 80048 BASIC METABOLIC PNL TOTAL CA: CPT | Performed by: STUDENT IN AN ORGANIZED HEALTH CARE EDUCATION/TRAINING PROGRAM

## 2024-10-01 PROCEDURE — 82948 REAGENT STRIP/BLOOD GLUCOSE: CPT

## 2024-10-01 PROCEDURE — 94799 UNLISTED PULMONARY SVC/PX: CPT

## 2024-10-01 PROCEDURE — 83735 ASSAY OF MAGNESIUM: CPT | Performed by: STUDENT IN AN ORGANIZED HEALTH CARE EDUCATION/TRAINING PROGRAM

## 2024-10-01 PROCEDURE — 25010000002 NA FERRIC GLUC CPLX PER 12.5 MG: Performed by: INTERNAL MEDICINE

## 2024-10-01 PROCEDURE — 63710000001 INSULIN GLARGINE PER 5 UNITS: Performed by: STUDENT IN AN ORGANIZED HEALTH CARE EDUCATION/TRAINING PROGRAM

## 2024-10-01 PROCEDURE — 82746 ASSAY OF FOLIC ACID SERUM: CPT | Performed by: STUDENT IN AN ORGANIZED HEALTH CARE EDUCATION/TRAINING PROGRAM

## 2024-10-01 PROCEDURE — 84100 ASSAY OF PHOSPHORUS: CPT | Performed by: STUDENT IN AN ORGANIZED HEALTH CARE EDUCATION/TRAINING PROGRAM

## 2024-10-01 PROCEDURE — 82607 VITAMIN B-12: CPT | Performed by: STUDENT IN AN ORGANIZED HEALTH CARE EDUCATION/TRAINING PROGRAM

## 2024-10-01 PROCEDURE — 82140 ASSAY OF AMMONIA: CPT | Performed by: STUDENT IN AN ORGANIZED HEALTH CARE EDUCATION/TRAINING PROGRAM

## 2024-10-01 PROCEDURE — 82948 REAGENT STRIP/BLOOD GLUCOSE: CPT | Performed by: FAMILY MEDICINE

## 2024-10-01 PROCEDURE — 99232 SBSQ HOSP IP/OBS MODERATE 35: CPT | Performed by: INTERNAL MEDICINE

## 2024-10-01 PROCEDURE — 25810000003 SODIUM CHLORIDE 0.9 % SOLUTION: Performed by: INTERNAL MEDICINE

## 2024-10-01 PROCEDURE — 25010000002 ONDANSETRON PER 1 MG: Performed by: FAMILY MEDICINE

## 2024-10-01 RX ADMIN — OXYCODONE HYDROCHLORIDE 5 MG: 5 TABLET ORAL at 21:39

## 2024-10-01 RX ADMIN — LACTULOSE 20 G: 10 SOLUTION ORAL at 08:16

## 2024-10-01 RX ADMIN — LACTULOSE 20 G: 10 SOLUTION ORAL at 11:21

## 2024-10-01 RX ADMIN — RIFAXIMIN 550 MG: 550 TABLET ORAL at 20:40

## 2024-10-01 RX ADMIN — SODIUM CHLORIDE 250 MG: 9 INJECTION, SOLUTION INTRAVENOUS at 08:21

## 2024-10-01 RX ADMIN — Medication 10 ML: at 08:16

## 2024-10-01 RX ADMIN — INSULIN LISPRO 2 UNITS: 100 INJECTION, SOLUTION INTRAVENOUS; SUBCUTANEOUS at 12:43

## 2024-10-01 RX ADMIN — LACTULOSE 20 G: 10 SOLUTION ORAL at 21:39

## 2024-10-01 RX ADMIN — INSULIN GLARGINE 15 UNITS: 100 INJECTION, SOLUTION SUBCUTANEOUS at 08:16

## 2024-10-01 RX ADMIN — INSULIN GLARGINE 15 UNITS: 100 INJECTION, SOLUTION SUBCUTANEOUS at 20:40

## 2024-10-01 RX ADMIN — SODIUM ZIRCONIUM CYCLOSILICATE 10 G: 10 POWDER, FOR SUSPENSION ORAL at 11:21

## 2024-10-01 RX ADMIN — MICONAZOLE NITRATE 1 APPLICATION: 2 CREAM TOPICAL at 20:41

## 2024-10-01 RX ADMIN — Medication 10 ML: at 20:40

## 2024-10-01 RX ADMIN — MICONAZOLE NITRATE 1 APPLICATION: 2 CREAM TOPICAL at 11:22

## 2024-10-01 RX ADMIN — ONDANSETRON 4 MG: 2 INJECTION INTRAMUSCULAR; INTRAVENOUS at 21:39

## 2024-10-01 RX ADMIN — RIFAXIMIN 550 MG: 550 TABLET ORAL at 08:16

## 2024-10-01 NOTE — NURSING NOTE
During morning shift report patient called out and said he was bleeding. Night shift nurse assess and applied bandage. Patient later took off bandage. He requested a new one because there was dried blood on his hand. This nurse went in took a picture and added to avatar. Approximated skin and applied an optifoam dressing. Patient tolerated well.

## 2024-10-01 NOTE — PLAN OF CARE
Goal Outcome Evaluation:      Patient did not wish to wear BiPAP tonight. Remained on room air without incident.

## 2024-10-01 NOTE — PROGRESS NOTES
[facility]   Hospitalist Progress Note  Date: 10/1/2024  Patient Name: Nic Nicolas  : 1965  MRN: [id number]  Date of admission: 2024  Room/Bed: [facility]      Subjective   Subjective     Chief Complaint: Altered mental status    Summary:  Nic Nicolas is a 59 y.o. male with past medical history of cirrhosis, diabetes, hyperlipidemia, hypertension, CHF, BREE, CKD, TBI, asthma, morbid obesity, and GERD presented to the ED from nursing home for evaluation of altered mental status.  Patient was discharged from this hospital 5 days prior with the same complaints and symptoms where he likely not been getting his lactulose and rifaximin at the nursing home.  Since his discharge from here, patient began to decline once again so he was brought back to the ED for further evaluation.  There is some confusion of whether or not the issue was corrected of him not getting his lactulose and rifaximin.  In the ED patient's vitals were all within normal limits on arrival.  Labs showed lactic acidosis with pancytopenia and ammonia of 190.  X-ray was negative for any acute findings.  Patient was altered and confused when seen but was able to swallow his lactulose.  Patient was disoriented when seen.  Patient admitted for further evaluation. Mentation has significantly improved. Lactulose and Rifaximin continued. Nephrology consulted for IVELISSE. There is concern for patient not receiving Lactulose while in the rehab, will reach out to  to see if that's actually true as he has been having multiple admission for hyperammonemia and has been compliant with medications while in the hospital. He stated he never has bowel movement daily while in rehab and concerned about not getting his medications. Nephrology following    Interval Followup:   Patient sitting on edge of bed, alert and oriented to name, date of birth and current location.  Patient knows the current year however does not know the date.  Patient  has difficulty with concentration, having difficulty reciting days of the week backwards.  Patient with no flapping asterixis on exam.  Patient's potassium up to 5.3 today.  Creatinine downtrending, 1.3 currently.    Objective   Objective     Vitals:   Temp:  [98 °F (36.7 °C)-99.1 °F (37.3 °C)] 98.6 °F (37 °C)  Heart Rate:  [] 87  Resp:  [18-20] 18  BP: (134-161)/(56-86) 152/66    Physical Exam   General: Awake, alert, NAD  HENT: NCAT, MMM  Cardiovascular: RRR, no murmurs   Pulmonary: CTA bilaterally; no wheezes; no conversational dyspnea  Gastrointestinal: S/distended/NT, +BS  Neuro: alert, awake, orientedx2; speech clear; no tremor, no asterixis, difficulty with concentration      Result Review    Result Review:  I have personally reviewed these results:  [x]  Laboratory      Lab 10/01/24  0506 09/30/24  0531 09/29/24  0529 09/29/24  0210 09/28/24  2240 09/28/24  1929 09/28/24  1619   WBC 5.11 4.17 3.93  --   --   --  2.26*   HEMOGLOBIN 8.3* 7.1* 7.2*  --   --   --  7.3*   HEMATOCRIT 27.3* 24.4* 23.2*  --   --   --  24.0*   PLATELETS 75* 76* 62*  --   --   --  56*   NEUTROS ABS 3.22 2.41  --   --   --   --  1.41*   IMMATURE GRANS (ABS) 0.02 0.01  --   --   --   --  0.00   LYMPHS ABS 1.03 1.10  --   --   --   --  0.53*   MONOS ABS 0.64 0.50  --   --   --   --  0.26   EOS ABS 0.15 0.11  --   --   --   --  0.04   MCV 81.5 85.6 81.7  --   --   --  82.5   LACTATE  --   --   --  1.8 2.2* 2.7*  --          Lab 10/01/24  0506 09/30/24  0531 09/29/24  0529 09/28/24  1929 09/28/24  1619   SODIUM 134* 135* 134*  --  131*   POTASSIUM 5.3* 5.0 5.0  --  5.7*   CHLORIDE 107 107 104  --  101   CO2 19.4* 18.2* 20.4*  --  21.5*   ANION GAP 7.6 9.8 9.6  --  8.5   BUN 20 29* 44*  --  45*   CREATININE 1.30* 1.33* 2.08*   < > 2.53*   EGFR 63.7 62.0 36.2*  --  28.6*   GLUCOSE 168* 129* 210*  --  292*   CALCIUM 9.0 8.7 9.0  --  8.9   MAGNESIUM 1.8 2.0  --   --  2.3   PHOSPHORUS 3.1 3.7  --   --   --     < > = values in this  interval not displayed.         Lab 09/28/24  1619   TOTAL PROTEIN 5.6*   ALBUMIN 2.9*   GLOBULIN 2.7   ALT (SGPT) 20   AST (SGOT) 31   BILIRUBIN 1.2   ALK PHOS 91         Lab 09/28/24  1619   HSTROP T 42*             Lab 10/01/24  0506 09/30/24  0839 09/30/24  0531 09/29/24  0529   IRON  --   --   --  34*   IRON SATURATION (TSAT)  --   --   --  9*   TIBC  --   --   --  395   TRANSFERRIN  --   --   --  265   FERRITIN  --   --  72.82  --    FOLATE 8.32  --   --   --    VITAMIN B 12 732  --   --   --    ABO TYPING  --  A  --   --    RH TYPING  --  Positive  --   --    ANTIBODY SCREEN  --  Negative  --   --          Lab 09/28/24  1929   FIO2 21     Brief Urine Lab Results  (Last result in the past 365 days)        Color   Clarity   Blood   Leuk Est   Nitrite   Protein   CREAT   Urine HCG        09/29/24 0419 Yellow   Clear   Negative   Trace   Negative   Negative                 [x]  Microbiology   Microbiology Results (last 10 days)       ** No results found for the last 240 hours. **          [x]  Radiology  CT Abdomen Pelvis Without Contrast    Result Date: 9/30/2024  Impression: 1. Cirrhosis and splenomegaly with trace right upper quadrant ascites. 2. Supraumbilical hernia containing mesenteric fat. Electronically Signed: Flavia Dewey MD  9/30/2024 1:54 PM EDT  Workstation ID: [id number]    XR Chest 1 View    Result Date: 9/28/2024  Impression: No acute infiltrate Electronically Signed: Fili Jones MD  9/28/2024 4:20 PM EDT  Workstation ID: [id number]   []  EKG/Telemetry   []  Cardiology/Vascular   []  Pathology  []  Old records  []  Other:    Assessment & Plan   Assessment / Plan     Assessment:  Hepatic encephalopathy, resolved  Anemia  Cirrhosis  Hypertension  Diabetes Mellitus  CHF with preserved ejection fraction  BREE on Bipap  Pancytopenia  Morbid obesity    Plan:  Patient currently being managed on medicine service.  Continue lactulose and Rifaximin.  On insulin glargine and ISS.  Hemoglobin improved  today, up to 8.3, continue to monitor daily  Iron deficiency anemia; receiving IV Iron.  Thrombocytopenia; likely in the setting of underlying cirrhosis  Nephrology following the patient; IVELISSE improving.  Holding IV lasix  Patient receiving Lokelma today for potassium of 5.3, repeat in the morning  Concern for patient not receiving his lactulose while in rehab due to frequent admissions.  aware.  Clinical course to determine disposition.     Discussed with RN.  Discussed with case management    VTE Prophylaxis:  Mechanical VTE prophylaxis orders are present.        CODE STATUS:   Level Of Support Discussed With: Patient  Code Status (Patient has no pulse and is not breathing): CPR (Attempt to Resuscitate)  Medical Interventions (Patient has pulse or is breathing): Full Support

## 2024-10-01 NOTE — SIGNIFICANT NOTE
10/01/24 1315   Coping/Psychosocial   Observed Emotional State calm;cooperative   Verbalized Emotional State hopefulness   Trust Relationship/Rapport empathic listening provided   Involvement in Care interacting with patient   Additional Documentation Spiritual Care (Group)   Spiritual Care   Use of Spiritual Resources non-Alevism use of spiritual care   Spiritual Care Source  initiative   Spiritual Care Follow-Up follow-up, none required as presently assessed   Response to Spiritual Care receptive of support   Spiritual Care Interventions supportive conversation provided   Spiritual Care Visit Type initial   Receptivity to Spiritual Care visit welcomed

## 2024-10-01 NOTE — PROGRESS NOTES
[facility]     Progress Note    Patient Name: Nic Nicolas  : 1965  MRN: [id number]  Primary Care Physician:  Sheldon Carcamo MD  Date of admission: 2024    Subjective patient is fully awake alert responsive interactive not in any acute distress    Review of Systems  All review of systems are negative except as mentioned in subjective complaints.    Objective   Objective     Vitals:   Temp:  [98 °F (36.7 °C)-99.1 °F (37.3 °C)] 98.6 °F (37 °C)  Heart Rate:  [] 87  Resp:  [18-20] 18  BP: (134-161)/(56-86) 152/66  Physical Exam    Constitutional: Awake, alert responsive, conversant, no obvious distress              Psychiatric:  Appropriate affect, cooperative   Neurologic:  Awake alert ,oriented x 3, strength symmetric in all extremities, Cranial Nerves grossly intact to confrontation, speech clear   Eyes:   PERRLA, sclerae anicteric, no conjunctival injection   HEENT:  Moist mucous membranes, no nasal or eye discharge, no throat congestion   Neck:   Supple, no thyromegaly, no lymphadenopathy, trachea midline, no elevated JVD   Respiratory:  Clear to auscultation bilaterally, nonlabored respirations    Cardiovascular: RRR, no murmurs, rubs, or gallops, palpable pedal pulses bilaterally, No bilateral ankle edema   Gastrointestinal: Positive bowel sounds, soft, nontender, nondistended, no organomegaly   Musculoskeletal:  No clubbing or cyanosis to extremities,muscle wasting, joint swelling, muscle weakness             Skin:                      No rashes, bruising, skin ulcers, petechiae or ecchymosis    Result Review    Result Review:  I have personally reviewed the results from the time of this admission to 10/1/2024 09:20 EDT and agree with these findings:  []  Laboratory  []  Microbiology  []  Radiology  []  EKG/Telemetry   []  Cardiology/Vascular   []  Pathology  []  Old records  []  Other:    Results from last 7 days   Lab Units 10/01/24  0506 24  0531 24  0529  09/28/24  1619   WBC 10*3/mm3 5.11 4.17 3.93 2.26*   HEMOGLOBIN g/dL 8.3* 7.1* 7.2* 7.3*   PLATELETS 10*3/mm3 75* 76* 62* 56*     Results from last 7 days   Lab Units 10/01/24  0506 09/30/24  0531 09/29/24  0529 09/28/24  1929 09/28/24  1619   SODIUM mmol/L 134* 135* 134*  --  131*   POTASSIUM mmol/L 5.3* 5.0 5.0  --  5.7*   CHLORIDE mmol/L 107 107 104  --  101   CO2 mmol/L 19.4* 18.2* 20.4*  --  21.5*   ANION GAP mmol/L 7.6 9.8 9.6  --  8.5   BUN mg/dL 20 29* 44*  --  45*   CREATININE mg/dL 1.30* 1.33* 2.08* 2.01* 2.53*   GLUCOSE mg/dL 168* 129* 210*  --  292*       Assessment & Plan   Assessment / Plan       Active Hospital Problems:    Active Hospital Problems    Diagnosis  POA    **Hepatic encephalopathy [K76.82]  Yes    Acute renal failure [N17.9]  Unknown     Secondary intravascular volume contraction      Chronic anemia [D64.9]  Yes     Start IV iron as patient is iron deficient secondary to most likely GI bleeding      Sleep apnea [G47.30]  Yes    Liver cirrhosis secondary to ROMO (nonalcoholic steatohepatitis) [K75.81, K74.60]  Yes    Systolic congestive heart failure [I50.20]  Yes    High blood pressure [I10]  Yes    Type 2 diabetes mellitus with hyperglycemia [E11.65]  Yes       Plan:   Continue supportive care  Will give 1 dose of Lokelma  DC planning per primary team       Electronically signed by Calos Gates MD, 10/01/24, 9:20 AM EDT.

## 2024-10-01 NOTE — PLAN OF CARE
Goal Outcome Evaluation:              Outcome Evaluation: Patient alert and able to make needs known. Moments of forgetfullness. Iv changed today due to leakage. Denies complaints of pain or discomfort. Dressing applied to left hand. Denies wants or needs at this time.                        Ella Mendenhall RN

## 2024-10-01 NOTE — PLAN OF CARE
Goal Outcome Evaluation:  Plan of Care Reviewed With: patient        Progress: no change  Outcome Evaluation: Pt a&ox4 this shift. Medicated prn per MAR for c/o back pain and nausea. Skin care performed.

## 2024-10-01 NOTE — PLAN OF CARE
Goal Outcome Evaluation:  Plan of Care Reviewed With: patient        Progress: no change  Outcome Evaluation: Patient did not wear bipap last night. He us currently on room air doing fine, no distress.

## 2024-10-01 NOTE — CONSULTS
Discharge Planning Assessment   Erin     Patient Name: Nic Nicolas  MRN: [id number]  Today's Date: 10/1/2024    Admit Date: 9/28/2024    Plan: Pt is LTC resident at signature of sungómez cowan and SW confirmed that pt is able to return at [place].    JESSENIA Gomez

## 2024-10-02 LAB
ALBUMIN SERPL-MCNC: 2.9 G/DL (ref 3.5–5.2)
ALBUMIN/GLOB SERPL: 1.2 G/DL
ALP SERPL-CCNC: 76 U/L (ref 39–117)
ALT SERPL W P-5'-P-CCNC: 19 U/L (ref 1–41)
ANION GAP SERPL CALCULATED.3IONS-SCNC: 8.2 MMOL/L (ref 5–15)
AST SERPL-CCNC: 32 U/L (ref 1–40)
BASOPHILS # BLD AUTO: 0.03 10*3/MM3 (ref 0–0.2)
BASOPHILS NFR BLD AUTO: 0.8 % (ref 0–1.5)
BILIRUB SERPL-MCNC: 2 MG/DL (ref 0–1.2)
BUN SERPL-MCNC: 17 MG/DL (ref 6–20)
BUN/CREAT SERPL: 16.7 (ref 7–25)
CALCIUM SPEC-SCNC: 8.6 MG/DL (ref 8.6–10.5)
CHLORIDE SERPL-SCNC: 104 MMOL/L (ref 98–107)
CO2 SERPL-SCNC: 19.8 MMOL/L (ref 22–29)
CREAT SERPL-MCNC: 1.02 MG/DL (ref 0.76–1.27)
DEPRECATED RDW RBC AUTO: 49.2 FL (ref 37–54)
DEPRECATED RDW RBC AUTO: 50.8 FL (ref 37–54)
EGFRCR SERPLBLD CKD-EPI 2021: 85.2 ML/MIN/1.73
EOSINOPHIL # BLD AUTO: 0.12 10*3/MM3 (ref 0–0.4)
EOSINOPHIL NFR BLD AUTO: 3.2 % (ref 0.3–6.2)
ERYTHROCYTE [DISTWIDTH] IN BLOOD BY AUTOMATED COUNT: 16.3 % (ref 12.3–15.4)
ERYTHROCYTE [DISTWIDTH] IN BLOOD BY AUTOMATED COUNT: 16.5 % (ref 12.3–15.4)
GLOBULIN UR ELPH-MCNC: 2.4 GM/DL
GLUCOSE BLDC GLUCOMTR-MCNC: 121 MG/DL (ref 70–99)
GLUCOSE BLDC GLUCOMTR-MCNC: 149 MG/DL (ref 70–99)
GLUCOSE BLDC GLUCOMTR-MCNC: 160 MG/DL (ref 70–99)
GLUCOSE BLDC GLUCOMTR-MCNC: 212 MG/DL (ref 70–99)
GLUCOSE BLDC GLUCOMTR-MCNC: 85 MG/DL (ref 70–99)
GLUCOSE SERPL-MCNC: 84 MG/DL (ref 65–99)
HCT VFR BLD AUTO: 23.8 % (ref 37.5–51)
HCT VFR BLD AUTO: 28.6 % (ref 37.5–51)
HGB BLD-MCNC: 7.3 G/DL (ref 13–17.7)
HGB BLD-MCNC: 8.6 G/DL (ref 13–17.7)
IMM GRANULOCYTES # BLD AUTO: 0.01 10*3/MM3 (ref 0–0.05)
IMM GRANULOCYTES NFR BLD AUTO: 0.3 % (ref 0–0.5)
LYMPHOCYTES # BLD AUTO: 0.89 10*3/MM3 (ref 0.7–3.1)
LYMPHOCYTES NFR BLD AUTO: 23.9 % (ref 19.6–45.3)
MAGNESIUM SERPL-MCNC: 1.6 MG/DL (ref 1.6–2.6)
MCH RBC QN AUTO: 25.4 PG (ref 26.6–33)
MCH RBC QN AUTO: 25.6 PG (ref 26.6–33)
MCHC RBC AUTO-ENTMCNC: 30.1 G/DL (ref 31.5–35.7)
MCHC RBC AUTO-ENTMCNC: 30.7 G/DL (ref 31.5–35.7)
MCV RBC AUTO: 82.9 FL (ref 79–97)
MCV RBC AUTO: 85.1 FL (ref 79–97)
MONOCYTES # BLD AUTO: 0.43 10*3/MM3 (ref 0.1–0.9)
MONOCYTES NFR BLD AUTO: 11.6 % (ref 5–12)
NEUTROPHILS NFR BLD AUTO: 2.24 10*3/MM3 (ref 1.7–7)
NEUTROPHILS NFR BLD AUTO: 60.2 % (ref 42.7–76)
NRBC BLD AUTO-RTO: 0 /100 WBC (ref 0–0.2)
PHOSPHATE SERPL-MCNC: 3.4 MG/DL (ref 2.5–4.5)
PLATELET # BLD AUTO: 63 10*3/MM3 (ref 140–450)
PLATELET # BLD AUTO: 64 10*3/MM3 (ref 140–450)
PMV BLD AUTO: 10.8 FL (ref 6–12)
PMV BLD AUTO: 12.1 FL (ref 6–12)
POTASSIUM SERPL-SCNC: 4.7 MMOL/L (ref 3.5–5.2)
PROT SERPL-MCNC: 5.3 G/DL (ref 6–8.5)
RBC # BLD AUTO: 2.87 10*6/MM3 (ref 4.14–5.8)
RBC # BLD AUTO: 3.36 10*6/MM3 (ref 4.14–5.8)
SODIUM SERPL-SCNC: 132 MMOL/L (ref 136–145)
WBC NRBC COR # BLD AUTO: 3.72 10*3/MM3 (ref 3.4–10.8)
WBC NRBC COR # BLD AUTO: 4.49 10*3/MM3 (ref 3.4–10.8)

## 2024-10-02 PROCEDURE — 63710000001 INSULIN LISPRO (HUMAN) PER 5 UNITS: Performed by: FAMILY MEDICINE

## 2024-10-02 PROCEDURE — 82948 REAGENT STRIP/BLOOD GLUCOSE: CPT

## 2024-10-02 PROCEDURE — 25010000002 NA FERRIC GLUC CPLX PER 12.5 MG: Performed by: INTERNAL MEDICINE

## 2024-10-02 PROCEDURE — 94799 UNLISTED PULMONARY SVC/PX: CPT

## 2024-10-02 PROCEDURE — 85025 COMPLETE CBC W/AUTO DIFF WBC: CPT | Performed by: STUDENT IN AN ORGANIZED HEALTH CARE EDUCATION/TRAINING PROGRAM

## 2024-10-02 PROCEDURE — 25010000002 ONDANSETRON PER 1 MG: Performed by: FAMILY MEDICINE

## 2024-10-02 PROCEDURE — 99232 SBSQ HOSP IP/OBS MODERATE 35: CPT | Performed by: INTERNAL MEDICINE

## 2024-10-02 PROCEDURE — 85027 COMPLETE CBC AUTOMATED: CPT | Performed by: INTERNAL MEDICINE

## 2024-10-02 PROCEDURE — 82948 REAGENT STRIP/BLOOD GLUCOSE: CPT | Performed by: FAMILY MEDICINE

## 2024-10-02 PROCEDURE — 63710000001 INSULIN GLARGINE PER 5 UNITS: Performed by: STUDENT IN AN ORGANIZED HEALTH CARE EDUCATION/TRAINING PROGRAM

## 2024-10-02 PROCEDURE — 83735 ASSAY OF MAGNESIUM: CPT | Performed by: STUDENT IN AN ORGANIZED HEALTH CARE EDUCATION/TRAINING PROGRAM

## 2024-10-02 PROCEDURE — 84100 ASSAY OF PHOSPHORUS: CPT | Performed by: STUDENT IN AN ORGANIZED HEALTH CARE EDUCATION/TRAINING PROGRAM

## 2024-10-02 PROCEDURE — 25810000003 SODIUM CHLORIDE 0.9 % SOLUTION: Performed by: INTERNAL MEDICINE

## 2024-10-02 PROCEDURE — 80053 COMPREHEN METABOLIC PANEL: CPT | Performed by: INTERNAL MEDICINE

## 2024-10-02 RX ORDER — CARVEDILOL 25 MG/1
25 TABLET ORAL 2 TIMES DAILY WITH MEALS
Status: DISCONTINUED | OUTPATIENT
Start: 2024-10-02 | End: 2024-10-03 | Stop reason: HOSPADM

## 2024-10-02 RX ORDER — LEVETIRACETAM 500 MG/1
500 TABLET ORAL 2 TIMES DAILY
Status: DISCONTINUED | OUTPATIENT
Start: 2024-10-02 | End: 2024-10-03 | Stop reason: HOSPADM

## 2024-10-02 RX ORDER — PANTOPRAZOLE SODIUM 40 MG/1
40 TABLET, DELAYED RELEASE ORAL EVERY MORNING
Status: DISCONTINUED | OUTPATIENT
Start: 2024-10-02 | End: 2024-10-03 | Stop reason: HOSPADM

## 2024-10-02 RX ORDER — ROPINIROLE 1 MG/1
1 TABLET, FILM COATED ORAL NIGHTLY
Status: DISCONTINUED | OUTPATIENT
Start: 2024-10-02 | End: 2024-10-03 | Stop reason: HOSPADM

## 2024-10-02 RX ORDER — SERTRALINE HYDROCHLORIDE 100 MG/1
100 TABLET, FILM COATED ORAL NIGHTLY
Status: DISCONTINUED | OUTPATIENT
Start: 2024-10-02 | End: 2024-10-03 | Stop reason: HOSPADM

## 2024-10-02 RX ORDER — ALUMINA, MAGNESIA, AND SIMETHICONE 2400; 2400; 240 MG/30ML; MG/30ML; MG/30ML
15 SUSPENSION ORAL EVERY 6 HOURS PRN
Status: DISCONTINUED | OUTPATIENT
Start: 2024-10-02 | End: 2024-10-03 | Stop reason: HOSPADM

## 2024-10-02 RX ADMIN — INSULIN GLARGINE 15 UNITS: 100 INJECTION, SOLUTION SUBCUTANEOUS at 20:18

## 2024-10-02 RX ADMIN — LEVETIRACETAM 500 MG: 500 TABLET, FILM COATED ORAL at 20:18

## 2024-10-02 RX ADMIN — Medication 10 ML: at 08:30

## 2024-10-02 RX ADMIN — MICONAZOLE NITRATE 1 APPLICATION: 2 CREAM TOPICAL at 20:23

## 2024-10-02 RX ADMIN — LACTULOSE 20 G: 10 SOLUTION ORAL at 08:29

## 2024-10-02 RX ADMIN — ALUMINUM HYDROXIDE, MAGNESIUM HYDROXIDE, AND DIMETHICONE 15 ML: 400; 400; 40 SUSPENSION ORAL at 20:18

## 2024-10-02 RX ADMIN — SODIUM CHLORIDE 250 MG: 9 INJECTION, SOLUTION INTRAVENOUS at 08:29

## 2024-10-02 RX ADMIN — INSULIN LISPRO 2 UNITS: 100 INJECTION, SOLUTION INTRAVENOUS; SUBCUTANEOUS at 12:54

## 2024-10-02 RX ADMIN — INSULIN GLARGINE 15 UNITS: 100 INJECTION, SOLUTION SUBCUTANEOUS at 08:30

## 2024-10-02 RX ADMIN — SERTRALINE HYDROCHLORIDE 100 MG: 100 TABLET ORAL at 20:18

## 2024-10-02 RX ADMIN — ONDANSETRON 4 MG: 2 INJECTION INTRAMUSCULAR; INTRAVENOUS at 16:26

## 2024-10-02 RX ADMIN — RIFAXIMIN 550 MG: 550 TABLET ORAL at 08:29

## 2024-10-02 RX ADMIN — OXYCODONE HYDROCHLORIDE 5 MG: 5 TABLET ORAL at 18:05

## 2024-10-02 RX ADMIN — Medication 10 ML: at 20:18

## 2024-10-02 RX ADMIN — LACTULOSE 20 G: 10 SOLUTION ORAL at 11:47

## 2024-10-02 RX ADMIN — LACTULOSE 20 G: 10 SOLUTION ORAL at 20:17

## 2024-10-02 RX ADMIN — RIFAXIMIN 550 MG: 550 TABLET ORAL at 20:18

## 2024-10-02 RX ADMIN — LACTULOSE 20 G: 10 SOLUTION ORAL at 17:45

## 2024-10-02 RX ADMIN — CARVEDILOL 25 MG: 25 TABLET, FILM COATED ORAL at 17:45

## 2024-10-02 RX ADMIN — ROPINIROLE HYDROCHLORIDE 1 MG: 1 TABLET, FILM COATED ORAL at 20:18

## 2024-10-02 RX ADMIN — OXYCODONE HYDROCHLORIDE 5 MG: 5 TABLET ORAL at 11:20

## 2024-10-02 RX ADMIN — INSULIN LISPRO 4 UNITS: 100 INJECTION, SOLUTION INTRAVENOUS; SUBCUTANEOUS at 20:54

## 2024-10-02 RX ADMIN — MICONAZOLE NITRATE 1 APPLICATION: 2 CREAM TOPICAL at 11:26

## 2024-10-02 RX ADMIN — PANTOPRAZOLE SODIUM 40 MG: 40 TABLET, DELAYED RELEASE ORAL at 16:26

## 2024-10-02 NOTE — PLAN OF CARE
Goal Outcome Evaluation:      Patient remained on room air overnight. Did not wish to wear BiPAP tonight.

## 2024-10-02 NOTE — PLAN OF CARE
Goal Outcome Evaluation:  Plan of Care Reviewed With: patient        Progress: no change  Outcome Evaluation: Patient a&ox3. Medicated x1 for c/o pain and nausea. Skin care provided.

## 2024-10-02 NOTE — PROGRESS NOTES
[facility]     Progress Note    Patient Name: Nic Nicolas  : 1965  MRN: [id number]  Primary Care Physician:  Sheldon Carcamo MD  Date of admission: 2024    Subjective patient fully awake alert responsive interactive and he looks like back to his baseline mental status    Review of Systems  All review of systems are negative except as mentioned in subjective complaints.    Objective   Objective     Vitals:   Temp:  [98.1 °F (36.7 °C)-99 °F (37.2 °C)] 98.8 °F (37.1 °C)  Heart Rate:  [] 114  Resp:  [18-20] 20  BP: (128-167)/(48-76) 128/58  Physical Exam    Constitutional: Awake, alert responsive, conversant, no obvious distress              Psychiatric:  Appropriate affect, cooperative   Neurologic:  Awake alert ,oriented x 3, strength symmetric in all extremities, Cranial Nerves grossly intact to confrontation, speech clear   Eyes:   PERRLA, sclerae anicteric, no conjunctival injection   HEENT:  Moist mucous membranes, no nasal or eye discharge, no throat congestion   Neck:   Supple, no thyromegaly, no lymphadenopathy, trachea midline, no elevated JVD   Respiratory:  Clear to auscultation bilaterally, nonlabored respirations    Cardiovascular: RRR, no murmurs, rubs, or gallops, palpable pedal pulses bilaterally, No bilateral ankle edema   Gastrointestinal: Positive bowel sounds, soft, nontender, nondistended, no organomegaly   Musculoskeletal:  No clubbing or cyanosis to extremities,muscle wasting, joint swelling, muscle weakness             Skin:                      No rashes, bruising, skin ulcers, petechiae or ecchymosis    Result Review    Result Review:  I have personally reviewed the results from the time of this admission to 10/2/2024 09:07 EDT and agree with these findings:  []  Laboratory  []  Microbiology  []  Radiology  []  EKG/Telemetry   []  Cardiology/Vascular   []  Pathology  []  Old records  []  Other:    Results from last 7 days   Lab Units 10/02/24  3791  10/01/24  0506 09/30/24  0531 09/29/24  0529 09/28/24  1619   WBC 10*3/mm3 3.72 5.11 4.17 3.93 2.26*   HEMOGLOBIN g/dL 7.3* 8.3* 7.1* 7.2* 7.3*   PLATELETS 10*3/mm3 64* 75* 76* 62* 56*     Results from last 7 days   Lab Units 10/02/24  0451 10/01/24  0506 09/30/24  0531 09/29/24  0529 09/28/24  1929 09/28/24  1619   SODIUM mmol/L 132* 134* 135* 134*  --  131*   POTASSIUM mmol/L 4.7 5.3* 5.0 5.0  --  5.7*   CHLORIDE mmol/L 104 107 107 104  --  101   CO2 mmol/L 19.8* 19.4* 18.2* 20.4*  --  21.5*   ANION GAP mmol/L 8.2 7.6 9.8 9.6  --  8.5   BUN mg/dL 17 20 29* 44*  --  45*   CREATININE mg/dL 1.02 1.30* 1.33* 2.08* 2.01* 2.53*   GLUCOSE mg/dL 84 168* 129* 210*  --  292*       Assessment & Plan   Assessment / Plan       Active Hospital Problems:    Active Hospital Problems    Diagnosis  POA    **Hepatic encephalopathy [K76.82]  Yes    Acute renal failure [N17.9]  Unknown     Secondary intravascular volume contraction      Chronic anemia [D64.9]  Yes     Start IV iron as patient is iron deficient secondary to most likely GI bleeding      Sleep apnea [G47.30]  Yes    Liver cirrhosis secondary to ROMO (nonalcoholic steatohepatitis) [K75.81, K74.60]  Yes    Systolic congestive heart failure [I50.20]  Yes    High blood pressure [I10]  Yes    Type 2 diabetes mellitus with hyperglycemia [E11.65]  Yes       Plan:   Acute kidney injury resolved  I will avoid adding Aldactone because of hyperkalemia  Discharge planning per primary team       Electronically signed by Calos Gates MD, 10/02/24, 9:07 AM EDT.

## 2024-10-02 NOTE — PROGRESS NOTES
[facility]   Hospitalist Progress Note  Date: 10/2/2024  Patient Name: Nic Nicolas  : 1965  MRN: [id number]  Date of admission: 2024  Room/Bed: [facility]      Subjective   Subjective     Chief Complaint: Altered mental status    Summary:  Nic Nicolas is a 59 y.o. male with past medical history of cirrhosis, diabetes, hyperlipidemia, hypertension, CHF, BREE, CKD, TBI, asthma, morbid obesity, and GERD presented to the ED from nursing home for evaluation of altered mental status.  Patient was discharged from this hospital 5 days prior with the same complaints and symptoms where he likely not been getting his lactulose and rifaximin at the nursing home.  Since his discharge from here, patient began to decline once again so he was brought back to the ED for further evaluation.  There is some confusion of whether or not the issue was corrected of him not getting his lactulose and rifaximin.  In the ED patient's vitals were all within normal limits on arrival.  Labs showed lactic acidosis with pancytopenia and ammonia of 190.  X-ray was negative for any acute findings.  Patient was altered and confused when seen but was able to swallow his lactulose.  Patient was disoriented when seen.  Patient admitted for further evaluation. Mentation has significantly improved. Lactulose and Rifaximin continued. Nephrology consulted for IVELISSE. There is concern for patient not receiving Lactulose while in the rehab, will reach out to  to see if that's actually true as he has been having multiple admission for hyperammonemia and has been compliant with medications while in the hospital. He stated he never has bowel movement daily while in rehab and concerned about not getting his medications. Nephrology following    Interval Followup:   Patient with elevated heart rates earlier today, review of home medications shows patient should be on Coreg will resume now.  Patient's hemoglobin down to 7.3 this  morning, repeat in the afternoon up to 8.6.  Patient's mentation better today    Objective   Objective     Vitals:   Temp:  [98.4 °F (36.9 °C)-98.9 °F (37.2 °C)] 98.5 °F (36.9 °C)  Heart Rate:  [] 84  Resp:  [18-20] 18  BP: (128-167)/(58-76) 134/62    Physical Exam   General: Awake, alert, NAD  HENT: NCAT, MMM  Cardiovascular: RRR, no murmurs   Pulmonary: CTA bilaterally; no wheezes; no conversational dyspnea  Gastrointestinal: S/distended/NT, +BS  Neuro: alert, awake, orientedx2; speech clear; no tremor, no asterixis, has done better with concentration questioning      Result Review    Result Review:  I have personally reviewed these results:  [x]  Laboratory      Lab 10/02/24  1509 10/02/24  0451 10/01/24  0506 09/30/24  0531 09/29/24  0529 09/29/24  0210 09/28/24  2240 09/28/24  1929   WBC 4.49 3.72 5.11 4.17   < >  --   --   --    HEMOGLOBIN 8.6* 7.3* 8.3* 7.1*   < >  --   --   --    HEMATOCRIT 28.6* 23.8* 27.3* 24.4*   < >  --   --   --    PLATELETS 63* 64* 75* 76*   < >  --   --   --    NEUTROS ABS  --  2.24 3.22 2.41  --   --   --   --    IMMATURE GRANS (ABS)  --  0.01 0.02 0.01  --   --   --   --    LYMPHS ABS  --  0.89 1.03 1.10  --   --   --   --    MONOS ABS  --  0.43 0.64 0.50  --   --   --   --    EOS ABS  --  0.12 0.15 0.11  --   --   --   --    MCV 85.1 82.9 81.5 85.6   < >  --   --   --    LACTATE  --   --   --   --   --  1.8 2.2* 2.7*    < > = values in this interval not displayed.         Lab 10/02/24  0451 10/01/24  0506 09/30/24  0531   SODIUM 132* 134* 135*   POTASSIUM 4.7 5.3* 5.0   CHLORIDE 104 107 107   CO2 19.8* 19.4* 18.2*   ANION GAP 8.2 7.6 9.8   BUN 17 20 29*   CREATININE 1.02 1.30* 1.33*   EGFR 85.2 63.7 62.0   GLUCOSE 84 168* 129*   CALCIUM 8.6 9.0 8.7   MAGNESIUM 1.6 1.8 2.0   PHOSPHORUS 3.4 3.1 3.7         Lab 10/02/24  0451 09/28/24  1619   TOTAL PROTEIN 5.3* 5.6*   ALBUMIN 2.9* 2.9*   GLOBULIN 2.4 2.7   ALT (SGPT) 19 20   AST (SGOT) 32 31   BILIRUBIN 2.0* 1.2   ALK PHOS 76 91          Lab 09/28/24  1619   HSTROP T 42*             Lab 10/01/24  0506 09/30/24  0839 09/30/24  0531 09/29/24  0529   IRON  --   --   --  34*   IRON SATURATION (TSAT)  --   --   --  9*   TIBC  --   --   --  395   TRANSFERRIN  --   --   --  265   FERRITIN  --   --  72.82  --    FOLATE 8.32  --   --   --    VITAMIN B 12 732  --   --   --    ABO TYPING  --  A  --   --    RH TYPING  --  Positive  --   --    ANTIBODY SCREEN  --  Negative  --   --          Lab 09/28/24  1929   FIO2 21     Brief Urine Lab Results  (Last result in the past 365 days)        Color   Clarity   Blood   Leuk Est   Nitrite   Protein   CREAT   Urine HCG        09/29/24 0419 Yellow   Clear   Negative   Trace   Negative   Negative                 [x]  Microbiology   Microbiology Results (last 10 days)       ** No results found for the last 240 hours. **          [x]  Radiology  CT Abdomen Pelvis Without Contrast    Result Date: 9/30/2024  Impression: 1. Cirrhosis and splenomegaly with trace right upper quadrant ascites. 2. Supraumbilical hernia containing mesenteric fat. Electronically Signed: Flavia Dewey MD  9/30/2024 1:54 PM EDT  Workstation ID: [id number]    XR Chest 1 View    Result Date: 9/28/2024  Impression: No acute infiltrate Electronically Signed: Fili Jones MD  9/28/2024 4:20 PM EDT  Workstation ID: [id number]   []  EKG/Telemetry   []  Cardiology/Vascular   []  Pathology  []  Old records  []  Other:    Assessment & Plan   Assessment / Plan     Assessment:  Hepatic encephalopathy, resolved  Anemia  Cirrhosis  Hypertension  Diabetes Mellitus  CHF with preserved ejection fraction  BREE on Bipap  Pancytopenia  Morbid obesity    Plan:  Patient currently being managed on medicine service.  Continue lactulose and Rifaximin.  On insulin glargine and ISS.  Hemoglobin initially down this morning, back up today 0.6 this afternoon  Iron deficiency anemia; receiving IV Iron.  Thrombocytopenia; likely in the setting of underlying  cirrhosis  Nephrology following the patient; IVELISSE improving.  Recommendations to hold Aldactone on discharge by nephrology  Resuming more of home medications including Coreg today  Concern for patient not receiving his lactulose while in rehab due to frequent admissions.  aware.  Clinical course to determine disposition.     Discussed with RN.  Discussed with case management    VTE Prophylaxis:  Mechanical VTE prophylaxis orders are present.        CODE STATUS:   Level Of Support Discussed With: Patient  Code Status (Patient has no pulse and is not breathing): CPR (Attempt to Resuscitate)  Medical Interventions (Patient has pulse or is breathing): Full Support

## 2024-10-03 VITALS
WEIGHT: 278.66 LBS | RESPIRATION RATE: 20 BRPM | SYSTOLIC BLOOD PRESSURE: 122 MMHG | HEART RATE: 61 BPM | DIASTOLIC BLOOD PRESSURE: 42 MMHG | TEMPERATURE: 97.3 F | HEIGHT: 68 IN | BODY MASS INDEX: 42.23 KG/M2 | OXYGEN SATURATION: 100 %

## 2024-10-03 LAB
ALBUMIN SERPL-MCNC: 3.2 G/DL (ref 3.5–5.2)
ANION GAP SERPL CALCULATED.3IONS-SCNC: 9.5 MMOL/L (ref 5–15)
BASOPHILS # BLD AUTO: 0.03 10*3/MM3 (ref 0–0.2)
BASOPHILS NFR BLD AUTO: 0.7 % (ref 0–1.5)
BUN SERPL-MCNC: 21 MG/DL (ref 6–20)
BUN/CREAT SERPL: 16.2 (ref 7–25)
CALCIUM SPEC-SCNC: 8.9 MG/DL (ref 8.6–10.5)
CHLORIDE SERPL-SCNC: 106 MMOL/L (ref 98–107)
CO2 SERPL-SCNC: 18.5 MMOL/L (ref 22–29)
CREAT SERPL-MCNC: 1.3 MG/DL (ref 0.76–1.27)
DEPRECATED RDW RBC AUTO: 50.5 FL (ref 37–54)
EGFRCR SERPLBLD CKD-EPI 2021: 63.7 ML/MIN/1.73
EOSINOPHIL # BLD AUTO: 0.11 10*3/MM3 (ref 0–0.4)
EOSINOPHIL NFR BLD AUTO: 2.7 % (ref 0.3–6.2)
ERYTHROCYTE [DISTWIDTH] IN BLOOD BY AUTOMATED COUNT: 16.5 % (ref 12.3–15.4)
GLUCOSE BLDC GLUCOMTR-MCNC: 157 MG/DL (ref 70–99)
GLUCOSE SERPL-MCNC: 146 MG/DL (ref 65–99)
HCT VFR BLD AUTO: 27.5 % (ref 37.5–51)
HGB BLD-MCNC: 8 G/DL (ref 13–17.7)
IMM GRANULOCYTES # BLD AUTO: 0.01 10*3/MM3 (ref 0–0.05)
IMM GRANULOCYTES NFR BLD AUTO: 0.2 % (ref 0–0.5)
LYMPHOCYTES # BLD AUTO: 0.68 10*3/MM3 (ref 0.7–3.1)
LYMPHOCYTES NFR BLD AUTO: 16.8 % (ref 19.6–45.3)
MAGNESIUM SERPL-MCNC: 1.8 MG/DL (ref 1.6–2.6)
MCH RBC QN AUTO: 25 PG (ref 26.6–33)
MCHC RBC AUTO-ENTMCNC: 29.1 G/DL (ref 31.5–35.7)
MCV RBC AUTO: 85.9 FL (ref 79–97)
MONOCYTES # BLD AUTO: 0.46 10*3/MM3 (ref 0.1–0.9)
MONOCYTES NFR BLD AUTO: 11.4 % (ref 5–12)
NEUTROPHILS NFR BLD AUTO: 2.76 10*3/MM3 (ref 1.7–7)
NEUTROPHILS NFR BLD AUTO: 68.2 % (ref 42.7–76)
NRBC BLD AUTO-RTO: 0 /100 WBC (ref 0–0.2)
PHOSPHATE SERPL-MCNC: 3.5 MG/DL (ref 2.5–4.5)
PLATELET # BLD AUTO: 63 10*3/MM3 (ref 140–450)
PMV BLD AUTO: 10.8 FL (ref 6–12)
POTASSIUM SERPL-SCNC: 4.7 MMOL/L (ref 3.5–5.2)
RBC # BLD AUTO: 3.2 10*6/MM3 (ref 4.14–5.8)
SODIUM SERPL-SCNC: 134 MMOL/L (ref 136–145)
WBC NRBC COR # BLD AUTO: 4.05 10*3/MM3 (ref 3.4–10.8)

## 2024-10-03 PROCEDURE — 82948 REAGENT STRIP/BLOOD GLUCOSE: CPT

## 2024-10-03 PROCEDURE — 85025 COMPLETE CBC W/AUTO DIFF WBC: CPT | Performed by: STUDENT IN AN ORGANIZED HEALTH CARE EDUCATION/TRAINING PROGRAM

## 2024-10-03 PROCEDURE — 83735 ASSAY OF MAGNESIUM: CPT | Performed by: STUDENT IN AN ORGANIZED HEALTH CARE EDUCATION/TRAINING PROGRAM

## 2024-10-03 PROCEDURE — 63710000001 INSULIN LISPRO (HUMAN) PER 5 UNITS: Performed by: FAMILY MEDICINE

## 2024-10-03 PROCEDURE — 82948 REAGENT STRIP/BLOOD GLUCOSE: CPT | Performed by: FAMILY MEDICINE

## 2024-10-03 PROCEDURE — 63710000001 INSULIN GLARGINE PER 5 UNITS: Performed by: STUDENT IN AN ORGANIZED HEALTH CARE EDUCATION/TRAINING PROGRAM

## 2024-10-03 PROCEDURE — 80069 RENAL FUNCTION PANEL: CPT | Performed by: INTERNAL MEDICINE

## 2024-10-03 PROCEDURE — 99239 HOSP IP/OBS DSCHRG MGMT >30: CPT | Performed by: INTERNAL MEDICINE

## 2024-10-03 RX ADMIN — ALUMINUM HYDROXIDE, MAGNESIUM HYDROXIDE, AND DIMETHICONE 15 ML: 400; 400; 40 SUSPENSION ORAL at 05:51

## 2024-10-03 RX ADMIN — LACTULOSE 20 G: 10 SOLUTION ORAL at 08:24

## 2024-10-03 RX ADMIN — OXYCODONE HYDROCHLORIDE 5 MG: 5 TABLET ORAL at 09:51

## 2024-10-03 RX ADMIN — INSULIN LISPRO 2 UNITS: 100 INJECTION, SOLUTION INTRAVENOUS; SUBCUTANEOUS at 08:25

## 2024-10-03 RX ADMIN — RIFAXIMIN 550 MG: 550 TABLET ORAL at 08:24

## 2024-10-03 RX ADMIN — MICONAZOLE NITRATE 1 APPLICATION: 2 CREAM TOPICAL at 09:52

## 2024-10-03 RX ADMIN — Medication 10 ML: at 08:25

## 2024-10-03 RX ADMIN — PANTOPRAZOLE SODIUM 40 MG: 40 TABLET, DELAYED RELEASE ORAL at 06:23

## 2024-10-03 RX ADMIN — INSULIN GLARGINE 15 UNITS: 100 INJECTION, SOLUTION SUBCUTANEOUS at 08:25

## 2024-10-03 RX ADMIN — LEVETIRACETAM 500 MG: 500 TABLET, FILM COATED ORAL at 08:24

## 2024-10-03 RX ADMIN — OXYCODONE HYDROCHLORIDE 5 MG: 5 TABLET ORAL at 02:49

## 2024-10-03 RX ADMIN — LACTULOSE 20 G: 10 SOLUTION ORAL at 13:49

## 2024-10-03 NOTE — PLAN OF CARE
Goal Outcome Evaluation:  Plan of Care Reviewed With: patient        Progress: improving  Outcome Evaluation: pt is alert and oriented x4. On room air. Rested in bed and up in the chair with complaints of pain on his back and leg. Medicated prn meds. skin care done per order. Blood glucose checked and given supplemental insulin as order.  Order received to discharge the pt. Discharge summary given to the pt and was given the instructions and follow up information. Pt verbally confirmed understanding of the discharge instruction.

## 2024-10-03 NOTE — PLAN OF CARE
Goal Outcome Evaluation:  Plan of Care Reviewed With: patient, sibling        Progress: no change  Outcome Evaluation: Patient alert and oriented x3, intermitten confusion but easily reoriented. Patient complainted of generalized abdominal pain during through this shift, pepcid ordered and zofran given. Pain medication administered as ordered. Patient states these interventions helped a bit along with having frequent bms and passing gas while on the commode. Patient had IV infiltrate on left arm during Iron infusion, pharmacy notified. Patients arm was elevated, ice applied. Patient stated ice did not help so heat was placed, patient states this helped. Patient also had an episode of coverting to a.fibb with heart rate into 150's this did not last long before patient converted back per monitor room to sinus rythm. MD was notified, coregg started.

## 2024-10-03 NOTE — PLAN OF CARE
Goal Outcome Evaluation:  Plan of Care Reviewed With: patient        Progress: no change  Outcome Evaluation: Pt a&ox3. Medicated for c/o indigestion and leg pain. Pt restless and awake/up most of the night. Skin care provided.

## 2024-10-03 NOTE — PROGRESS NOTES
[facility]     Progress Note    Patient Name: Nic Nicolas  : 1965  MRN: [id number]  Primary Care Physician:  Sheldon Carcamo MD  Date of admission: 2024    Subjective patient is feeling better he has no new issues  Hemoglobin is very stable heart rate under good control    Review of Systems  All review of systems are negative except as mentioned in subjective complaints.    Objective   Objective     Vitals:   Temp:  [97.3 °F (36.3 °C)-98.5 °F (36.9 °C)] 97.3 °F (36.3 °C)  Heart Rate:  [] 61  Resp:  [18-20] 20  BP: (120-151)/(42-67) 122/42  Physical Exam    Constitutional: Awake, alert responsive, conversant, no obvious distress              Psychiatric:  Appropriate affect, cooperative   Neurologic:  Awake alert ,oriented x 3, strength symmetric in all extremities, Cranial Nerves grossly intact to confrontation, speech clear   Eyes:   PERRLA, sclerae anicteric, no conjunctival injection   HEENT:  Moist mucous membranes, no nasal or eye discharge, no throat congestion   Neck:   Supple, no thyromegaly, no lymphadenopathy, trachea midline, no elevated JVD   Respiratory:  Clear to auscultation bilaterally, nonlabored respirations    Cardiovascular: RRR, no murmurs, rubs, or gallops, palpable pedal pulses bilaterally, No bilateral ankle edema   Gastrointestinal: Positive bowel sounds, soft, nontender, nondistended, no organomegaly   Musculoskeletal:  No clubbing or cyanosis to extremities,muscle wasting, joint swelling, muscle weakness             Skin:                      No rashes, bruising, skin ulcers, petechiae or ecchymosis    Result Review    Result Review:  I have personally reviewed the results from the time of this admission to 10/3/2024 09:14 EDT and agree with these findings:  []  Laboratory  []  Microbiology  []  Radiology  []  EKG/Telemetry   []  Cardiology/Vascular   []  Pathology  []  Old records  []  Other:    Results from last 7 days   Lab Units 10/03/24  1459  10/02/24  1509 10/02/24  0451 10/01/24  0506 09/30/24  0531 09/29/24  0529 09/28/24  1619   WBC 10*3/mm3 4.05 4.49 3.72 5.11 4.17 3.93 2.26*   HEMOGLOBIN g/dL 8.0* 8.6* 7.3* 8.3* 7.1* 7.2* 7.3*   PLATELETS 10*3/mm3 63* 63* 64* 75* 76* 62* 56*     Results from last 7 days   Lab Units 10/03/24  0529 10/02/24  0451 10/01/24  0506 09/30/24  0531 09/29/24  0529 09/28/24  1929 09/28/24  1619   SODIUM mmol/L 134* 132* 134* 135* 134*  --  131*   POTASSIUM mmol/L 4.7 4.7 5.3* 5.0 5.0  --  5.7*   CHLORIDE mmol/L 106 104 107 107 104  --  101   CO2 mmol/L 18.5* 19.8* 19.4* 18.2* 20.4*  --  21.5*   ANION GAP mmol/L 9.5 8.2 7.6 9.8 9.6  --  8.5   BUN mg/dL 21* 17 20 29* 44*  --  45*   CREATININE mg/dL 1.30* 1.02 1.30* 1.33* 2.08* 2.01* 2.53*   GLUCOSE mg/dL 146* 84 168* 129* 210*  --  292*       Assessment & Plan   Assessment / Plan       Active Hospital Problems:    Active Hospital Problems    Diagnosis  POA    **Hepatic encephalopathy [K76.82]  Yes    Acute renal failure [N17.9]  Unknown     Secondary intravascular volume contraction      Chronic anemia [D64.9]  Yes     Start IV iron as patient is iron deficient secondary to most likely GI bleeding      Sleep apnea [G47.30]  Yes    Liver cirrhosis secondary to ROMO (nonalcoholic steatohepatitis) [K75.81, K74.60]  Yes    Systolic congestive heart failure [I50.20]  Yes    High blood pressure [I10]  Yes    Type 2 diabetes mellitus with hyperglycemia [E11.65]  Yes       Plan:   Patient is clinically stable  DC planning per primary team       Electronically signed by Calos Gates MD, 10/03/24, 9:14 AM EDT.

## 2024-10-03 NOTE — DISCHARGE SUMMARY
[facility]         HOSPITALIST  DISCHARGE SUMMARY    Patient Name: Nic Nicolas  : 1965  MRN: [id number]    Date of Admission: 2024  Date of Discharge:  10/3/2024  Primary Care Physician: Sheldon Carcamo MD    Consults:  Nephrology    Active and Resolved Hospital Problems:  Hepatic encephalopathy, resolved  Anemia  Cirrhosis  Hypertension  Diabetes Mellitus  Acute kidney injury  CHF with preserved ejection fraction  BREE on Bipap  Pancytopenia  Morbid obesity         Hospital Course     Hospital Course:  Nic Nicolas is a 59 y.o. male with past medical history of cirrhosis, diabetes, hyperlipidemia, hypertension, CHF, BREE, CKD, TBI, asthma, morbid obesity, and GERD presented to the ED from nursing home for evaluation of altered mental status.  Patient was discharged from this hospital 5 days prior with the same complaints and symptoms where he likely not been getting his lactulose and rifaximin at the nursing home.  Since his discharge from here, patient began to decline once again so he was brought back to the ED for further evaluation.  There is some confusion of whether or not the issue was corrected of him not getting his lactulose and rifaximin.  In the ED patient's vitals were all within normal limits on arrival.  Labs showed lactic acidosis with pancytopenia and ammonia of 190.  X-ray was negative for any acute findings.  Patient was altered and confused when seen but was able to swallow his lactulose. Patient admitted for further evaluation. Mentation has significantly improved. Lactulose and Rifaximin continued. Nephrology consulted for IVELISSE.  Patient's diuretics held with improvement in renal function.  On discharge recommendations to continue holding Aldactone due to hyperkalemia.  However Lasix also held on discharge.  Recommend repeat labs in 1 week, likely reinitiate patient back on Lasix.  Patient must be taking his lactulose and rifaximin as scheduled, once  on his regular medications here in the hospital his altered mental status resolved.  Patient seen on date of discharge, clinically and hemodynamically stable.  Patient provided concerning signs and symptoms prompting immediate medical attention, patient understanding and agreeable     DISCHARGE Follow Up Recommendations for labs and diagnostics:   Follow-up primary care physician soon as possible  Follow-up with nephrology in clinic  Recommend repeat labs in less than 1 week to monitor renal function and potassium      Day of Discharge     Vital Signs:  Temp:  [97.3 °F (36.3 °C)-98.5 °F (36.9 °C)] 97.3 °F (36.3 °C)  Heart Rate:  [] 61  Resp:  [18-20] 20  BP: (120-151)/(42-67) 122/42  Physical Exam:   General: Awake, alert, NAD  HENT: NCAT, MMM  Cardiovascular: RRR, no murmurs   Pulmonary: CTA bilaterally; no wheezes; no conversational dyspnea  Gastrointestinal: S/distended/NT, +BS  Neuro: alert, awake, orientedx2; speech clear; no tremor, no asterixis, better concentration with questioning    Discharge Details        Discharge Medications        Changes to Medications        Instructions Start Date   ondansetron 4 MG tablet  Commonly known as: Zofran  What changed: when to take this   4 mg, Oral, Every 8 Hours PRN             Continue These Medications        Instructions Start Date   busPIRone 7.5 MG tablet  Commonly known as: BUSPAR   7.5 mg, Oral, 3 Times Daily      carvedilol 25 MG tablet  Commonly known as: COREG   25 mg, Oral, 2 Times Daily With Meals      Diclofenac Sodium 1 % gel gel  Commonly known as: VOLTAREN   4 g, Topical, 4 Times Daily      famotidine 20 MG tablet  Commonly known as: PEPCID   20 mg, Oral, Daily      Flovent  MCG/ACT inhaler  Generic drug: fluticasone   1 puff, Inhalation, 2 Times Daily - RT      fluticasone 50 MCG/ACT nasal spray  Commonly known as: FLONASE   1 spray, Nasal, Daily      HumaLOG KwikPen 100 UNIT/ML solution pen-injector  Generic drug: Insulin Lispro (1 Unit  Dial)   15 Units, Subcutaneous, 3 Times Daily Before Meals      hydrocortisone 1 % cream   1 Application, Topical, 2 Times Daily PRN      hydrOXYzine 25 MG tablet  Commonly known as: ATARAX   Take 1 tablet by mouth Every 8 (Eight) Hours As Needed for Itching.      lactulose 10 GM/15ML solution  Commonly known as: CHRONULAC   20 g, Oral, 4 Times Daily      Levemir FlexPen 100 UNIT/ML injection  Generic drug: insulin detemir   10 Units, Subcutaneous, Daily      levETIRAcetam 500 MG tablet  Commonly known as: Keppra   500 mg, Oral, 2 Times Daily      LidozenPatch 4-1 % patch  Generic drug: Lidocaine-Menthol   1 patch, Apply externally, Every 12 Hours      magic barrier cream   1 Application, Topical, 2 Times Daily      magnesium oxide 400 MG tablet  Commonly known as: MAG-OX   400 mg, Oral, Daily      oxyCODONE 10 MG tablet  Commonly known as: ROXICODONE   Take 1 tablet by mouth Every 8 (Eight) Hours As Needed for Moderate Pain or Severe Pain.      pantoprazole 40 MG EC tablet  Commonly known as: PROTONIX   40 mg, Oral, Daily      rOPINIRole 1 MG tablet  Commonly known as: REQUIP   1 mg, Oral, Nightly, take 1 hour before bedtime      sertraline 100 MG tablet  Commonly known as: ZOLOFT   100 mg, Oral, Nightly      simethicone 80 MG chewable tablet  Commonly known as: MYLICON   80 mg, Oral, 3 Times Daily Before Meals      sucralfate 1 g tablet  Commonly known as: CARAFATE   1 g, Oral, 4 Times Daily      traZODone 50 MG tablet  Commonly known as: DESYREL   50 mg, Oral, Nightly      triamcinolone 0.1 % cream  Commonly known as: KENALOG   Apply 1 Application topically to the appropriate area as directed 2 (Two) Times a Day.      Vitamin D3 50 MCG (2000 UT) capsule   2,000 Units, Oral, Daily      Xifaxan 550 MG tablet  Generic drug: riFAXIMin   Take 1 tablet by mouth Every 12 (Twelve) Hours.             Stop These Medications      furosemide 40 MG tablet  Commonly known as: LASIX     lisinopril 5 MG tablet  Commonly known  as: PRINIVIL,ZESTRIL     potassium chloride 20 MEQ CR tablet  Commonly known as: KLOR-CON M20     spironolactone 100 MG tablet  Commonly known as: Aldactone              No Known Allergies    Discharge Disposition:  Long Term Care (DC - External)    Diet:  Hospital:  Diet Order   Procedures    Diet: Cardiac; Healthy Heart (2-3 Na+); Fluid Consistency: Thin (IDDSI 0)       Discharge Activity:   Activity Instructions       Activity as Tolerated              CODE STATUS:  Code Status and Medical Interventions: CPR (Attempt to Resuscitate); Full Support   Ordered at: 09/28/24 2143     Level Of Support Discussed With:    Patient     Code Status (Patient has no pulse and is not breathing):    CPR (Attempt to Resuscitate)     Medical Interventions (Patient has pulse or is breathing):    Full Support         Future Appointments   Date Time Provider Department Center   10/8/2024 10:00 AM CHAIR 5 [facility]   10/15/2024 10:00 AM CHAIR 5 [facility]   10/22/2024 10:00 AM CHAIR 3 [facility] OPINF [facility]   12/10/2024  9:30 AM NURSE/MA ONC [place] [facility] ONC [address] [facility]   12/13/2024 11:30 AM Juan Jose Sanchez MD [facility] ONC [address] [facility]   2/11/2025  2:30 PM Karlos Roblero MD [facility] GE ETW [facility]       Additional Instructions for the Follow-ups that You Need to Schedule       Discharge Follow-up with PCP   As directed       Currently Documented PCP:    Sheldon Carcamo MD    PCP Phone Number:    [phone]     Follow Up Details: In less than one week        Discharge Follow-up with Specified Provider: Dr Kody Daigle; 1 Week   As directed      To: Dr Kody Daigle   Follow Up: 1 Week                Pertinent  and/or Most Recent Results     PROCEDURES:   None     LAB RESULTS:      Lab 10/03/24  0529 10/02/24  1509 10/02/24  0451 10/01/24  0506 09/30/24  0531 09/29/24  0529 09/29/24  0210 09/28/24  2240 09/28/24  1929 09/28/24  1619   WBC 4.05 4.49 3.72 5.11 4.17   < >  --   --   --  2.26*    HEMOGLOBIN 8.0* 8.6* 7.3* 8.3* 7.1*   < >  --   --   --  7.3*   HEMATOCRIT 27.5* 28.6* 23.8* 27.3* 24.4*   < >  --   --   --  24.0*   PLATELETS 63* 63* 64* 75* 76*   < >  --   --   --  56*   NEUTROS ABS 2.76  --  2.24 3.22 2.41  --   --   --   --  1.41*   IMMATURE GRANS (ABS) 0.01  --  0.01 0.02 0.01  --   --   --   --  0.00   LYMPHS ABS 0.68*  --  0.89 1.03 1.10  --   --   --   --  0.53*   MONOS ABS 0.46  --  0.43 0.64 0.50  --   --   --   --  0.26   EOS ABS 0.11  --  0.12 0.15 0.11  --   --   --   --  0.04   MCV 85.9 85.1 82.9 81.5 85.6   < >  --   --   --  82.5   LACTATE  --   --   --   --   --   --  1.8 2.2* 2.7*  --     < > = values in this interval not displayed.         Lab 10/03/24  0529 10/02/24  0451 10/01/24  0506 09/30/24  0531 09/29/24  0529 09/28/24  1929 09/28/24  1619   SODIUM 134* 132* 134* 135* 134*  --  131*   POTASSIUM 4.7 4.7 5.3* 5.0 5.0  --  5.7*   CHLORIDE 106 104 107 107 104  --  101   CO2 18.5* 19.8* 19.4* 18.2* 20.4*  --  21.5*   ANION GAP 9.5 8.2 7.6 9.8 9.6  --  8.5   BUN 21* 17 20 29* 44*  --  45*   CREATININE 1.30* 1.02 1.30* 1.33* 2.08*   < > 2.53*   EGFR 63.7 85.2 63.7 62.0 36.2*  --  28.6*   GLUCOSE 146* 84 168* 129* 210*  --  292*   CALCIUM 8.9 8.6 9.0 8.7 9.0  --  8.9   MAGNESIUM 1.8 1.6 1.8 2.0  --   --  2.3   PHOSPHORUS 3.5 3.4 3.1 3.7  --   --   --     < > = values in this interval not displayed.         Lab 10/03/24  0529 10/02/24  0451 09/28/24  1619   TOTAL PROTEIN  --  5.3* 5.6*   ALBUMIN 3.2* 2.9* 2.9*   GLOBULIN  --  2.4 2.7   ALT (SGPT)  --  19 20   AST (SGOT)  --  32 31   BILIRUBIN  --  2.0* 1.2   ALK PHOS  --  76 91         Lab 09/28/24  1619   HSTROP T 42*             Lab 10/01/24  0506 09/30/24  0839 09/30/24  0531 09/29/24  0529   IRON  --   --   --  34*   IRON SATURATION (TSAT)  --   --   --  9*   TIBC  --   --   --  395   TRANSFERRIN  --   --   --  265   FERRITIN  --   --  72.82  --    FOLATE 8.32  --   --   --    VITAMIN B 12 732  --   --   --    ABO TYPING  --   A  --   --    RH TYPING  --  Positive  --   --    ANTIBODY SCREEN  --  Negative  --   --          Lab 09/28/24  1929   FIO2 21     Brief Urine Lab Results  (Last result in the past 365 days)        Color   Clarity   Blood   Leuk Est   Nitrite   Protein   CREAT   Urine HCG        09/29/24 0419 Yellow   Clear   Negative   Trace   Negative   Negative                 Microbiology Results (last 10 days)       ** No results found for the last 240 hours. **            CT Abdomen Pelvis Without Contrast    Result Date: 9/30/2024  Impression: Impression: 1. Cirrhosis and splenomegaly with trace right upper quadrant ascites. 2. Supraumbilical hernia containing mesenteric fat. Electronically Signed: Flavia Dewey MD  9/30/2024 1:54 PM EDT  Workstation ID: [id number]    XR Chest 1 View    Result Date: 9/28/2024  Impression: Impression: No acute infiltrate Electronically Signed: Fili Jones MD  9/28/2024 4:20 PM EDT  Workstation ID: [id number]    XR Chest 1 View    Result Date: 9/21/2024  Impression: New bilateral infiltrates are seen. The findings may represent infectious multifocal pneumonia. Pulmonary edema cannot be excluded.    Portions of this note were completed with a voice recognition program.       Electronically Signed By-Herberth Arevalo MD On:9/21/2024 1:01 AM       Results for orders placed during the hospital encounter of 02/24/22    Duplex Carotid Ultrasound CAR    Interpretation Summary  · No significant stenosis is present in carotid bifurcations bilaterally.  · There are right mid internal carotid artery velocity elevations that would meet criteria for mild to moderate stenosis, however, this appears to be related to tortuosity in this region.  · No significant plaque in the bifurcations bilaterally.  · Vertebral flow is antegrade bilaterally.      Results for orders placed during the hospital encounter of 02/24/22    Duplex Carotid Ultrasound CAR    Interpretation Summary  · No significant stenosis is present  in carotid bifurcations bilaterally.  · There are right mid internal carotid artery velocity elevations that would meet criteria for mild to moderate stenosis, however, this appears to be related to tortuosity in this region.  · No significant plaque in the bifurcations bilaterally.  · Vertebral flow is antegrade bilaterally.      Results for orders placed during the hospital encounter of 05/13/24    Adult Transthoracic Echo Complete W/ Cont if Necessary Per Protocol    Interpretation Summary    Left ventricular ejection fraction appears to be 66 - 70%.    Left ventricular wall thickness is consistent with mild concentric hypertrophy.    Left ventricular diastolic function is consistent with (grade I) impaired relaxation.    The left atrial cavity is mildly dilated.    Estimated right ventricular systolic pressure from tricuspid regurgitation is normal (<35 mmHg).      Labs Pending at Discharge:        Time spent on Discharge including face to face service:  38 minutes    Electronically signed by Odell Soliz MD, 10/03/24, 11:22 AM EDT.

## 2024-10-04 LAB — GLUCOSE BLDC GLUCOMTR-MCNC: 142 MG/DL (ref 70–99)

## 2024-10-08 DIAGNOSIS — D50.0 IRON DEFICIENCY ANEMIA DUE TO CHRONIC BLOOD LOSS: Primary | ICD-10-CM

## 2024-10-15 ENCOUNTER — LAB (OUTPATIENT)
Dept: LAB | Facility: HOSPITAL | Age: 59
End: 2024-10-15
Payer: MEDICAID

## 2024-10-15 DIAGNOSIS — D50.0 IRON DEFICIENCY ANEMIA DUE TO CHRONIC BLOOD LOSS: ICD-10-CM

## 2024-10-15 LAB
ALBUMIN SERPL-MCNC: 2.9 G/DL (ref 3.5–5.2)
ALBUMIN/GLOB SERPL: 1.2 G/DL
ALP SERPL-CCNC: 94 U/L (ref 39–117)
ALT SERPL W P-5'-P-CCNC: 24 U/L (ref 1–41)
ANION GAP SERPL CALCULATED.3IONS-SCNC: 7.1 MMOL/L (ref 5–15)
AST SERPL-CCNC: 40 U/L (ref 1–40)
BASOPHILS # BLD AUTO: 0.03 10*3/MM3 (ref 0–0.2)
BASOPHILS NFR BLD AUTO: 1.4 % (ref 0–1.5)
BILIRUB SERPL-MCNC: 1 MG/DL (ref 0–1.2)
BUN SERPL-MCNC: 28 MG/DL (ref 6–20)
BUN/CREAT SERPL: 25.2 (ref 7–25)
CALCIUM SPEC-SCNC: 8.3 MG/DL (ref 8.6–10.5)
CHLORIDE SERPL-SCNC: 110 MMOL/L (ref 98–107)
CO2 SERPL-SCNC: 19.9 MMOL/L (ref 22–29)
CREAT SERPL-MCNC: 1.11 MG/DL (ref 0.76–1.27)
DEPRECATED RDW RBC AUTO: 71.7 FL (ref 37–54)
EGFRCR SERPLBLD CKD-EPI 2021: 77 ML/MIN/1.73
EOSINOPHIL # BLD AUTO: 0.05 10*3/MM3 (ref 0–0.4)
EOSINOPHIL NFR BLD AUTO: 2.3 % (ref 0.3–6.2)
ERYTHROCYTE [DISTWIDTH] IN BLOOD BY AUTOMATED COUNT: 22.5 % (ref 12.3–15.4)
FERRITIN SERPL-MCNC: 190.1 NG/ML (ref 30–400)
GLOBULIN UR ELPH-MCNC: 2.4 GM/DL
GLUCOSE SERPL-MCNC: 249 MG/DL (ref 65–99)
HCT VFR BLD AUTO: 27.7 % (ref 37.5–51)
HGB BLD-MCNC: 8.3 G/DL (ref 13–17.7)
HYPOCHROMIA BLD QL: NORMAL
IMM GRANULOCYTES # BLD AUTO: 0.02 10*3/MM3 (ref 0–0.05)
IMM GRANULOCYTES NFR BLD AUTO: 0.9 % (ref 0–0.5)
IRON 24H UR-MRATE: 85 MCG/DL (ref 59–158)
IRON SATN MFR SERPL: 27 % (ref 20–50)
LYMPHOCYTES # BLD AUTO: 0.44 10*3/MM3 (ref 0.7–3.1)
LYMPHOCYTES NFR BLD AUTO: 20 % (ref 19.6–45.3)
MCH RBC QN AUTO: 26.8 PG (ref 26.6–33)
MCHC RBC AUTO-ENTMCNC: 30 G/DL (ref 31.5–35.7)
MCV RBC AUTO: 89.4 FL (ref 79–97)
MONOCYTES # BLD AUTO: 0.22 10*3/MM3 (ref 0.1–0.9)
MONOCYTES NFR BLD AUTO: 10 % (ref 5–12)
NEUTROPHILS NFR BLD AUTO: 1.44 10*3/MM3 (ref 1.7–7)
NEUTROPHILS NFR BLD AUTO: 65.4 % (ref 42.7–76)
NRBC BLD AUTO-RTO: 0 /100 WBC (ref 0–0.2)
OVALOCYTES BLD QL SMEAR: NORMAL
PLATELET # BLD AUTO: 37 10*3/MM3 (ref 140–450)
PMV BLD AUTO: ABNORMAL FL
POTASSIUM SERPL-SCNC: 4.7 MMOL/L (ref 3.5–5.2)
PROT SERPL-MCNC: 5.3 G/DL (ref 6–8.5)
RBC # BLD AUTO: 3.1 10*6/MM3 (ref 4.14–5.8)
SMALL PLATELETS BLD QL SMEAR: NORMAL
SODIUM SERPL-SCNC: 137 MMOL/L (ref 136–145)
TIBC SERPL-MCNC: 314 MCG/DL (ref 298–536)
TRANSFERRIN SERPL-MCNC: 211 MG/DL (ref 200–360)
WBC MORPH BLD: NORMAL
WBC NRBC COR # BLD AUTO: 2.2 10*3/MM3 (ref 3.4–10.8)

## 2024-10-15 PROCEDURE — 80053 COMPREHEN METABOLIC PANEL: CPT

## 2024-10-15 PROCEDURE — 85007 BL SMEAR W/DIFF WBC COUNT: CPT

## 2024-10-15 PROCEDURE — 36415 COLL VENOUS BLD VENIPUNCTURE: CPT

## 2024-10-15 PROCEDURE — 83540 ASSAY OF IRON: CPT

## 2024-10-15 PROCEDURE — 85025 COMPLETE CBC W/AUTO DIFF WBC: CPT

## 2024-10-15 PROCEDURE — 82728 ASSAY OF FERRITIN: CPT

## 2024-10-15 PROCEDURE — 84466 ASSAY OF TRANSFERRIN: CPT

## 2024-10-15 NOTE — PROGRESS NOTES
Lab results back. Platelet count of 37. Is in rehab currently after hospital admission. Nursing reports bruising to abdomen where insulin injections are given and denies any acute bleeding with nose bleeds, rectal bleeding, or gums bleeding.  Received blood transfusion and ferrlecit infusion in hospital recently. History of cirrhosis / splenomegaly. Go to ER if any acute blood loss.     Has follow up with dr. Sanchez in December as scheduled.

## 2024-10-22 ENCOUNTER — TELEPHONE (OUTPATIENT)
Dept: ONCOLOGY | Facility: HOSPITAL | Age: 59
End: 2024-10-22
Payer: MEDICAID

## 2024-10-22 ENCOUNTER — LAB (OUTPATIENT)
Dept: LAB | Facility: HOSPITAL | Age: 59
End: 2024-10-22
Payer: MEDICAID

## 2024-10-22 DIAGNOSIS — D50.0 IRON DEFICIENCY ANEMIA DUE TO CHRONIC BLOOD LOSS: Primary | ICD-10-CM

## 2024-10-22 DIAGNOSIS — D69.6 THROMBOCYTOPENIA: ICD-10-CM

## 2024-10-22 DIAGNOSIS — D50.0 IRON DEFICIENCY ANEMIA DUE TO CHRONIC BLOOD LOSS: ICD-10-CM

## 2024-10-22 LAB
ANISOCYTOSIS BLD QL: NORMAL
BASOPHILS # BLD AUTO: 0.03 10*3/MM3 (ref 0–0.2)
BASOPHILS NFR BLD AUTO: 1 % (ref 0–1.5)
DEPRECATED RDW RBC AUTO: 79.1 FL (ref 37–54)
ELLIPTOCYTES BLD QL SMEAR: NORMAL
EOSINOPHIL # BLD AUTO: 0.1 10*3/MM3 (ref 0–0.4)
EOSINOPHIL NFR BLD AUTO: 3.4 % (ref 0.3–6.2)
ERYTHROCYTE [DISTWIDTH] IN BLOOD BY AUTOMATED COUNT: 23.1 % (ref 12.3–15.4)
FERRITIN SERPL-MCNC: 167.9 NG/ML (ref 30–400)
HCT VFR BLD AUTO: 26.8 % (ref 37.5–51)
HGB BLD-MCNC: 7.7 G/DL (ref 13–17.7)
HYPOCHROMIA BLD QL: NORMAL
IMM GRANULOCYTES # BLD AUTO: 0.02 10*3/MM3 (ref 0–0.05)
IMM GRANULOCYTES NFR BLD AUTO: 0.7 % (ref 0–0.5)
IRON 24H UR-MRATE: 36 MCG/DL (ref 59–158)
IRON SATN MFR SERPL: 11 % (ref 20–50)
LYMPHOCYTES # BLD AUTO: 0.56 10*3/MM3 (ref 0.7–3.1)
LYMPHOCYTES NFR BLD AUTO: 19.2 % (ref 19.6–45.3)
MCH RBC QN AUTO: 27.5 PG (ref 26.6–33)
MCHC RBC AUTO-ENTMCNC: 28.7 G/DL (ref 31.5–35.7)
MCV RBC AUTO: 95.7 FL (ref 79–97)
MICROCYTES BLD QL: NORMAL
MONOCYTES # BLD AUTO: 0.31 10*3/MM3 (ref 0.1–0.9)
MONOCYTES NFR BLD AUTO: 10.6 % (ref 5–12)
NEUTROPHILS NFR BLD AUTO: 1.9 10*3/MM3 (ref 1.7–7)
NEUTROPHILS NFR BLD AUTO: 65.1 % (ref 42.7–76)
NRBC BLD AUTO-RTO: 0 /100 WBC (ref 0–0.2)
PLATELET # BLD AUTO: 51 10*3/MM3 (ref 140–450)
PMV BLD AUTO: 12.3 FL (ref 6–12)
POIKILOCYTOSIS BLD QL SMEAR: NORMAL
RBC # BLD AUTO: 2.8 10*6/MM3 (ref 4.14–5.8)
SMALL PLATELETS BLD QL SMEAR: NORMAL
TIBC SERPL-MCNC: 335 MCG/DL (ref 298–536)
TRANSFERRIN SERPL-MCNC: 225 MG/DL (ref 200–360)
WBC MORPH BLD: NORMAL
WBC NRBC COR # BLD AUTO: 2.92 10*3/MM3 (ref 3.4–10.8)

## 2024-10-22 PROCEDURE — 85007 BL SMEAR W/DIFF WBC COUNT: CPT

## 2024-10-22 PROCEDURE — 82728 ASSAY OF FERRITIN: CPT

## 2024-10-22 PROCEDURE — 36415 COLL VENOUS BLD VENIPUNCTURE: CPT

## 2024-10-22 PROCEDURE — 84466 ASSAY OF TRANSFERRIN: CPT

## 2024-10-22 PROCEDURE — 85025 COMPLETE CBC W/AUTO DIFF WBC: CPT

## 2024-10-22 PROCEDURE — 83540 ASSAY OF IRON: CPT

## 2024-10-22 NOTE — TELEPHONE ENCOUNTER
Caller: GEGE Adriana NAVAS    Relationship:     Best call back number: [phone]    What is the best time to reach you: ASAP    Who are you requesting to speak with (clinical staff, provider,  specific staff member): CLINICAL    What was the call regarding: GEGE IS CALLING TO INQUIRE IF PT IS TO BE GETTING INFUSIONS.  PT STATES HE COMES TO THE OFFICE TO GET THEM BUT NO ONE HELPS HIM.    Is it okay if the provider responds through MyChart: NO

## 2024-10-24 ENCOUNTER — PREP FOR SURGERY (OUTPATIENT)
Dept: OTHER | Facility: HOSPITAL | Age: 59
End: 2024-10-24
Payer: MEDICAID

## 2024-10-24 DIAGNOSIS — K74.60 CIRRHOSIS OF LIVER WITH ASCITES, UNSPECIFIED HEPATIC CIRRHOSIS TYPE: ICD-10-CM

## 2024-10-24 DIAGNOSIS — R18.8 CIRRHOSIS OF LIVER WITH ASCITES, UNSPECIFIED HEPATIC CIRRHOSIS TYPE: ICD-10-CM

## 2024-10-24 DIAGNOSIS — R18.8 OTHER ASCITES: Primary | ICD-10-CM

## 2024-10-24 RX ORDER — ALBUMIN (HUMAN) 12.5 G/50ML
12.5 SOLUTION INTRAVENOUS ONCE
OUTPATIENT
Start: 2024-10-24 | End: 2024-10-24

## 2024-10-29 ENCOUNTER — HOSPITAL ENCOUNTER (INPATIENT)
Facility: HOSPITAL | Age: 59
LOS: 6 days | Discharge: NURSING, REHAB OR LONG-TERM CARE | DRG: 433 | End: 2024-11-04
Attending: INTERNAL MEDICINE | Admitting: INTERNAL MEDICINE
Payer: MEDICAID

## 2024-10-29 ENCOUNTER — OFFICE VISIT (OUTPATIENT)
Dept: GASTROENTEROLOGY | Facility: CLINIC | Age: 59
End: 2024-10-29
Payer: MEDICAID

## 2024-10-29 VITALS
DIASTOLIC BLOOD PRESSURE: 64 MMHG | OXYGEN SATURATION: 100 % | WEIGHT: 302 LBS | BODY MASS INDEX: 45.77 KG/M2 | HEART RATE: 71 BPM | HEIGHT: 68 IN | SYSTOLIC BLOOD PRESSURE: 149 MMHG

## 2024-10-29 DIAGNOSIS — K74.60 LIVER CIRRHOSIS SECONDARY TO NASH (NONALCOHOLIC STEATOHEPATITIS): Chronic | ICD-10-CM

## 2024-10-29 DIAGNOSIS — Z78.9 DECREASED ACTIVITIES OF DAILY LIVING (ADL): Primary | ICD-10-CM

## 2024-10-29 DIAGNOSIS — E72.20 HYPERAMMONEMIA: Primary | ICD-10-CM

## 2024-10-29 DIAGNOSIS — R26.2 DIFFICULTY IN WALKING: ICD-10-CM

## 2024-10-29 DIAGNOSIS — K76.82 HEPATIC ENCEPHALOPATHY: Chronic | ICD-10-CM

## 2024-10-29 DIAGNOSIS — I50.21 ACUTE SYSTOLIC CONGESTIVE HEART FAILURE: ICD-10-CM

## 2024-10-29 DIAGNOSIS — K72.90 DECOMPENSATED CIRRHOSIS: ICD-10-CM

## 2024-10-29 DIAGNOSIS — K27.4 GASTROINTESTINAL HEMORRHAGE ASSOCIATED WITH PEPTIC ULCER: ICD-10-CM

## 2024-10-29 DIAGNOSIS — N18.9 CHRONIC KIDNEY DISEASE, UNSPECIFIED CKD STAGE: ICD-10-CM

## 2024-10-29 DIAGNOSIS — K74.60 DECOMPENSATED CIRRHOSIS: ICD-10-CM

## 2024-10-29 DIAGNOSIS — K75.81 LIVER CIRRHOSIS SECONDARY TO NASH (NONALCOHOLIC STEATOHEPATITIS): Chronic | ICD-10-CM

## 2024-10-29 LAB
ABO GROUP BLD: NORMAL
ALBUMIN SERPL-MCNC: 3.1 G/DL (ref 3.5–5.2)
ALBUMIN/GLOB SERPL: 1.3 G/DL
ALP SERPL-CCNC: 145 U/L (ref 39–117)
ALT SERPL W P-5'-P-CCNC: 28 U/L (ref 1–41)
ANION GAP SERPL CALCULATED.3IONS-SCNC: 10.1 MMOL/L (ref 5–15)
AST SERPL-CCNC: 48 U/L (ref 1–40)
BASOPHILS # BLD AUTO: 0.03 10*3/MM3 (ref 0–0.2)
BASOPHILS NFR BLD AUTO: 0.8 % (ref 0–1.5)
BILIRUB SERPL-MCNC: 1.4 MG/DL (ref 0–1.2)
BLD GP AB SCN SERPL QL: NEGATIVE
BUN SERPL-MCNC: 29 MG/DL (ref 6–20)
BUN/CREAT SERPL: 24.8 (ref 7–25)
CALCIUM SPEC-SCNC: 8.4 MG/DL (ref 8.6–10.5)
CHLORIDE SERPL-SCNC: 109 MMOL/L (ref 98–107)
CO2 SERPL-SCNC: 23.9 MMOL/L (ref 22–29)
CREAT SERPL-MCNC: 1.17 MG/DL (ref 0.76–1.27)
DEPRECATED RDW RBC AUTO: 73.8 FL (ref 37–54)
EGFRCR SERPLBLD CKD-EPI 2021: 72.3 ML/MIN/1.73
EOSINOPHIL # BLD AUTO: 0.13 10*3/MM3 (ref 0–0.4)
EOSINOPHIL NFR BLD AUTO: 3.5 % (ref 0.3–6.2)
ERYTHROCYTE [DISTWIDTH] IN BLOOD BY AUTOMATED COUNT: 22 % (ref 12.3–15.4)
GLOBULIN UR ELPH-MCNC: 2.4 GM/DL
GLUCOSE BLDC GLUCOMTR-MCNC: 106 MG/DL (ref 70–99)
GLUCOSE BLDC GLUCOMTR-MCNC: 195 MG/DL (ref 70–99)
GLUCOSE SERPL-MCNC: 123 MG/DL (ref 65–99)
HCT VFR BLD AUTO: 24.4 % (ref 37.5–51)
HGB BLD-MCNC: 7.3 G/DL (ref 13–17.7)
HOLD SPECIMEN: NORMAL
IMM GRANULOCYTES # BLD AUTO: 0.01 10*3/MM3 (ref 0–0.05)
IMM GRANULOCYTES NFR BLD AUTO: 0.3 % (ref 0–0.5)
INR PPP: 2.11 (ref 0.86–1.15)
LYMPHOCYTES # BLD AUTO: 0.49 10*3/MM3 (ref 0.7–3.1)
LYMPHOCYTES NFR BLD AUTO: 13.1 % (ref 19.6–45.3)
MAGNESIUM SERPL-MCNC: 1.7 MG/DL (ref 1.6–2.6)
MCH RBC QN AUTO: 27.4 PG (ref 26.6–33)
MCHC RBC AUTO-ENTMCNC: 29.9 G/DL (ref 31.5–35.7)
MCV RBC AUTO: 91.7 FL (ref 79–97)
MONOCYTES # BLD AUTO: 0.49 10*3/MM3 (ref 0.1–0.9)
MONOCYTES NFR BLD AUTO: 13.1 % (ref 5–12)
NEUTROPHILS NFR BLD AUTO: 2.6 10*3/MM3 (ref 1.7–7)
NEUTROPHILS NFR BLD AUTO: 69.2 % (ref 42.7–76)
NRBC BLD AUTO-RTO: 0 /100 WBC (ref 0–0.2)
PLATELET # BLD AUTO: 62 10*3/MM3 (ref 140–450)
PMV BLD AUTO: 12.2 FL (ref 6–12)
POTASSIUM SERPL-SCNC: 3.4 MMOL/L (ref 3.5–5.2)
PROT SERPL-MCNC: 5.5 G/DL (ref 6–8.5)
PROTHROMBIN TIME: 24 SECONDS (ref 11.8–14.9)
RBC # BLD AUTO: 2.66 10*6/MM3 (ref 4.14–5.8)
RH BLD: POSITIVE
SODIUM SERPL-SCNC: 143 MMOL/L (ref 136–145)
T&S EXPIRATION DATE: NORMAL
WBC NRBC COR # BLD AUTO: 3.75 10*3/MM3 (ref 3.4–10.8)

## 2024-10-29 PROCEDURE — 85610 PROTHROMBIN TIME: CPT | Performed by: INTERNAL MEDICINE

## 2024-10-29 PROCEDURE — 25010000002 FUROSEMIDE PER 20 MG: Performed by: INTERNAL MEDICINE

## 2024-10-29 PROCEDURE — 86850 RBC ANTIBODY SCREEN: CPT | Performed by: INTERNAL MEDICINE

## 2024-10-29 PROCEDURE — 63710000001 INSULIN LISPRO (HUMAN) PER 5 UNITS: Performed by: INTERNAL MEDICINE

## 2024-10-29 PROCEDURE — 82948 REAGENT STRIP/BLOOD GLUCOSE: CPT

## 2024-10-29 PROCEDURE — 85025 COMPLETE CBC W/AUTO DIFF WBC: CPT | Performed by: INTERNAL MEDICINE

## 2024-10-29 PROCEDURE — 86901 BLOOD TYPING SEROLOGIC RH(D): CPT | Performed by: INTERNAL MEDICINE

## 2024-10-29 PROCEDURE — 86900 BLOOD TYPING SEROLOGIC ABO: CPT | Performed by: INTERNAL MEDICINE

## 2024-10-29 PROCEDURE — 83735 ASSAY OF MAGNESIUM: CPT | Performed by: INTERNAL MEDICINE

## 2024-10-29 PROCEDURE — 99223 1ST HOSP IP/OBS HIGH 75: CPT | Performed by: INTERNAL MEDICINE

## 2024-10-29 PROCEDURE — 80053 COMPREHEN METABOLIC PANEL: CPT | Performed by: INTERNAL MEDICINE

## 2024-10-29 RX ORDER — LEVETIRACETAM 500 MG/1
500 TABLET ORAL 2 TIMES DAILY
Status: DISCONTINUED | OUTPATIENT
Start: 2024-10-29 | End: 2024-11-04 | Stop reason: HOSPADM

## 2024-10-29 RX ORDER — OXYCODONE HYDROCHLORIDE 5 MG/1
10 TABLET ORAL ONCE
Status: COMPLETED | OUTPATIENT
Start: 2024-10-29 | End: 2024-10-29

## 2024-10-29 RX ORDER — SPIRONOLACTONE 25 MG/1
TABLET ORAL
Status: ON HOLD | COMMUNITY
Start: 2024-10-23 | End: 2024-10-29

## 2024-10-29 RX ORDER — BUSPIRONE HYDROCHLORIDE 15 MG/1
7.5 TABLET ORAL 3 TIMES DAILY
Status: DISCONTINUED | OUTPATIENT
Start: 2024-10-29 | End: 2024-11-04 | Stop reason: HOSPADM

## 2024-10-29 RX ORDER — NICOTINE POLACRILEX 4 MG
15 LOZENGE BUCCAL
Status: DISCONTINUED | OUTPATIENT
Start: 2024-10-29 | End: 2024-11-04 | Stop reason: HOSPADM

## 2024-10-29 RX ORDER — IBUPROFEN 600 MG/1
1 TABLET ORAL
Status: DISCONTINUED | OUTPATIENT
Start: 2024-10-29 | End: 2024-11-04 | Stop reason: HOSPADM

## 2024-10-29 RX ORDER — LACTULOSE 10 G/15ML
30 SOLUTION ORAL 4 TIMES DAILY
Status: DISCONTINUED | OUTPATIENT
Start: 2024-10-29 | End: 2024-11-04 | Stop reason: HOSPADM

## 2024-10-29 RX ORDER — FUROSEMIDE 40 MG/1
40 TABLET ORAL 2 TIMES DAILY
Status: ON HOLD | COMMUNITY
Start: 2024-10-24 | End: 2024-11-04

## 2024-10-29 RX ORDER — TRAMADOL HYDROCHLORIDE 50 MG/1
50 TABLET ORAL EVERY 6 HOURS PRN
Status: DISCONTINUED | OUTPATIENT
Start: 2024-10-29 | End: 2024-10-29

## 2024-10-29 RX ORDER — INSULIN LISPRO 100 [IU]/ML
2-7 INJECTION, SOLUTION INTRAVENOUS; SUBCUTANEOUS
Status: DISCONTINUED | OUTPATIENT
Start: 2024-10-29 | End: 2024-11-04 | Stop reason: HOSPADM

## 2024-10-29 RX ORDER — SACCHAROMYCES BOULARDII 250 MG
250 CAPSULE ORAL 2 TIMES DAILY
COMMUNITY

## 2024-10-29 RX ORDER — SODIUM CHLORIDE 0.9 % (FLUSH) 0.9 %
10 SYRINGE (ML) INJECTION EVERY 12 HOURS SCHEDULED
Status: DISCONTINUED | OUTPATIENT
Start: 2024-10-29 | End: 2024-11-04 | Stop reason: HOSPADM

## 2024-10-29 RX ORDER — DOXYCYCLINE 100 MG/1
100 CAPSULE ORAL 2 TIMES DAILY
COMMUNITY
Start: 2024-10-28 | End: 2024-11-04 | Stop reason: HOSPADM

## 2024-10-29 RX ORDER — GINSENG 100 MG
1 CAPSULE ORAL DAILY
COMMUNITY

## 2024-10-29 RX ORDER — DEXTROSE MONOHYDRATE 25 G/50ML
25 INJECTION, SOLUTION INTRAVENOUS
Status: DISCONTINUED | OUTPATIENT
Start: 2024-10-29 | End: 2024-11-04 | Stop reason: HOSPADM

## 2024-10-29 RX ORDER — FUROSEMIDE 10 MG/ML
80 INJECTION INTRAMUSCULAR; INTRAVENOUS ONCE
Status: COMPLETED | OUTPATIENT
Start: 2024-10-29 | End: 2024-10-29

## 2024-10-29 RX ORDER — CARVEDILOL 25 MG/1
25 TABLET ORAL 2 TIMES DAILY WITH MEALS
Status: DISCONTINUED | OUTPATIENT
Start: 2024-10-29 | End: 2024-11-04 | Stop reason: HOSPADM

## 2024-10-29 RX ORDER — POTASSIUM CHLORIDE 750 MG/1
40 CAPSULE, EXTENDED RELEASE ORAL ONCE
Status: COMPLETED | OUTPATIENT
Start: 2024-10-29 | End: 2024-10-29

## 2024-10-29 RX ORDER — FUROSEMIDE 10 MG/ML
40 INJECTION INTRAMUSCULAR; INTRAVENOUS
Status: DISCONTINUED | OUTPATIENT
Start: 2024-10-29 | End: 2024-11-02

## 2024-10-29 RX ORDER — SODIUM CHLORIDE 0.9 % (FLUSH) 0.9 %
10 SYRINGE (ML) INJECTION AS NEEDED
Status: DISCONTINUED | OUTPATIENT
Start: 2024-10-29 | End: 2024-11-04 | Stop reason: HOSPADM

## 2024-10-29 RX ORDER — SERTRALINE HYDROCHLORIDE 100 MG/1
100 TABLET, FILM COATED ORAL NIGHTLY
Status: DISCONTINUED | OUTPATIENT
Start: 2024-10-29 | End: 2024-11-04 | Stop reason: HOSPADM

## 2024-10-29 RX ORDER — TRAMADOL HYDROCHLORIDE 50 MG/1
50 TABLET ORAL EVERY 6 HOURS PRN
Status: DISCONTINUED | OUTPATIENT
Start: 2024-10-30 | End: 2024-10-30

## 2024-10-29 RX ADMIN — OXYCODONE 10 MG: 5 TABLET ORAL at 20:10

## 2024-10-29 RX ADMIN — Medication 10 ML: at 20:31

## 2024-10-29 RX ADMIN — LACTULOSE 30 G: 10 SOLUTION ORAL at 20:11

## 2024-10-29 RX ADMIN — FUROSEMIDE 40 MG: 10 INJECTION, SOLUTION INTRAMUSCULAR; INTRAVENOUS at 20:11

## 2024-10-29 RX ADMIN — TRAMADOL HYDROCHLORIDE 50 MG: 50 TABLET, COATED ORAL at 17:34

## 2024-10-29 RX ADMIN — POTASSIUM CHLORIDE 40 MEQ: 750 CAPSULE, EXTENDED RELEASE ORAL at 20:10

## 2024-10-29 RX ADMIN — SERTRALINE HYDROCHLORIDE 100 MG: 100 TABLET ORAL at 20:11

## 2024-10-29 RX ADMIN — LEVETIRACETAM 500 MG: 500 TABLET, FILM COATED ORAL at 20:11

## 2024-10-29 RX ADMIN — INSULIN LISPRO 2 UNITS: 100 INJECTION, SOLUTION INTRAVENOUS; SUBCUTANEOUS at 20:31

## 2024-10-29 RX ADMIN — CARVEDILOL 25 MG: 25 TABLET, FILM COATED ORAL at 20:10

## 2024-10-29 RX ADMIN — RIFAXIMIN 550 MG: 550 TABLET ORAL at 20:10

## 2024-10-29 RX ADMIN — BUSPIRONE HYDROCHLORIDE 7.5 MG: 15 TABLET ORAL at 20:10

## 2024-10-29 RX ADMIN — FUROSEMIDE 80 MG: 10 INJECTION, SOLUTION INTRAMUSCULAR; INTRAVENOUS at 17:29

## 2024-10-29 NOTE — PLAN OF CARE
Nic is a 59-year-old gent with past medical history of TBI and cirrhosis of the liver due to ROMO cirrhosis who was seen in GI clinic today.  Gastroenterology is concerned because the patient is gained 30 pounds in 1 month.  He feels that he needs effective diuresis and the paracentesis.  There was a discussion with family to about the patient will be DNR/DNI.  He is not a candidate for liver transplant due to other medical issues.  Be admitted to hospital for decompensated cirrhosis and diuresis.
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
## 2024-10-29 NOTE — H&P
[facility]  Date: 10/[facility]/2024   Patient Name: Nic Nicolas  : 1965  MRN: [id number]  Primary Care Physician:  Sheldon Carcamo MD  Date of admission: 10/29/2024    Subjective   Subjective     Chief Complaint: Abdominal distention    HPI:    Nic Nicolas is a 59 y.o. male past medical history of ROMO cirrhosis with history of hepatic encephalopathy and decompensated cirrhosis, type 2 diabetes, obstructive sleep apnea, TBI, and COPD who is admitted from GI clinic due to decompensated cirrhosis    Patient was seen in gastroenterology clinic today.  Noted that he was up almost 30 pounds with severe lower extremity swelling, abdominal distention, orthopnea, and PND.  There is long conversation with the GI doctor about goals of care and was decided the patient would not pursue CPR if something were to happen.  However, the patient is so far behind on his volume status that he needed to be admitted to the hospital for IV Lasix and fluid management.  Patient will also need a paracentesis.  As result, patient was admitted to hospital to manage his volume status from decompensated cirrhosis.    All systems reviewed and are as noted      Personal History     Past Medical History:  ROMO cirrhosis history of hepatic encephalopathy  Diabetes on insulin  DVT in the past  Hypertension  Obstructive sleep apnea  TBI  Asthma/COPD  GERD  Anxiety/depression     Past Surgical History:  Colonoscopy  Endoscopy  Knee surgery  Pelvic fracture surger     Family History:   Diabetes  Lung cancer     Social History:   Never smoked.  Never drank.      Home Medications:  Diclofenac Sodium, Insulin Lispro (1 Unit Dial), Lidocaine-Menthol, Vitamin D3, Zinc, busPIRone, carvedilol, doxycycline, famotidine, fluticasone, furosemide, hydrOXYzine, hydrocortisone, insulin detemir, lactulose, levETIRAcetam, magic barrier cream, magnesium oxide, ondansetron, oxyCODONE, pantoprazole, rOPINIRole,  riFAXIMin, saccharomyces boulardii, sertraline, simethicone, sucralfate, traZODone, and triamcinolone    Allergies:  No Known Allergies        Objective   Objective     Vitals:   Temp:  [98.3 °F (36.8 °C)] 98.3 °F (36.8 °C)  Heart Rate:  [68-71] 68  Resp:  [21] 21  BP: (146-149)/(64-66) 146/66    Physical Exam    Constitutional older than stated age   Eyes: Pupils equal, sclerae anicteric, no conjunctival injection   HENT: NCAT, mucous membranes moist   Neck: Supple, no thyromegaly, no lymphadenopathy, trachea midline   Respiratory: Clear to auscultation bilaterally, nonlabored respirations    Cardiovascular: RRR, no murmurs, rubs, or gallops, palpable pedal pulses bilaterally   Gastrointestinal: Distended   Musculoskeletal: 3+ pitting edema, no clubbing or cyanosis to extremities   Psychiatric: Appropriate affect, cooperative   Neurologic: Oriented x 3, strength symmetric in all extremities, Cranial Nerves grossly intact to confrontation, speech clear   Skin: No rashes     Result Review    Result Review:  I have personally reviewed the results from the time of this admission to 10/29/2024 19:45 EDT and agree with these findings:  Imaging and labs reviewed    Assessment & Plan   Assessment / Plan     Assessment/Plan:   Decompensated cirrhosis complicated by coagulopathy, thrombocytopenia,  Type 2 diabetes  Hypokalemia  TBI  Seizure disorder    Plan:  --Admit to hospital service  -- Consult gastroenterology  -- Will give the patient 80 mg of IV Lasix tonight  -- 40 of IV Lasix twice daily starting tomorrow  -- Strict I's and O's  -- Also order paracentesis with normal creatinine  -- Follow-up on ascitic fluid studies  -- 40 of oral potassium due to potassium 3.4  -- Repeat hemoglobin is at 7.3 and transfuse for less than 7.  I assume that it is little bit lower than previously due to volume overload          VTE Prophylaxis:  SCDs but if platelets remain above 50,000 can consider starting as long as hemoglobin does  not drop below 7        CODE STATUS:    Level Of Support Discussed With: Patient  Code Status (Patient has no pulse and is not breathing): No CPR (Do Not Attempt to Resuscitate)  Medical Interventions (Patient has pulse or is breathing): Full Support      Admission Status:  I believe this patient meets admission status.    Electronically signed by Daniel Berrios MD, 10/29/24, 4:22 PM EDT.

## 2024-10-30 ENCOUNTER — APPOINTMENT (OUTPATIENT)
Dept: INTERVENTIONAL RADIOLOGY/VASCULAR | Facility: HOSPITAL | Age: 59
DRG: 433 | End: 2024-10-30
Payer: MEDICAID

## 2024-10-30 LAB
ALBUMIN SERPL-MCNC: 2.7 G/DL (ref 3.5–5.2)
ALBUMIN/GLOB SERPL: 1.2 G/DL
ALP SERPL-CCNC: 129 U/L (ref 39–117)
ALT SERPL W P-5'-P-CCNC: 24 U/L (ref 1–41)
ANION GAP SERPL CALCULATED.3IONS-SCNC: 8.6 MMOL/L (ref 5–15)
ANISOCYTOSIS BLD QL: NORMAL
APTT PPP: 40.1 SECONDS (ref 24.2–34.2)
AST SERPL-CCNC: 40 U/L (ref 1–40)
BASOPHILS # BLD AUTO: 0.02 10*3/MM3 (ref 0–0.2)
BASOPHILS NFR BLD AUTO: 0.8 % (ref 0–1.5)
BILIRUB SERPL-MCNC: 1.1 MG/DL (ref 0–1.2)
BUN SERPL-MCNC: 30 MG/DL (ref 6–20)
BUN/CREAT SERPL: 25.4 (ref 7–25)
CALCIUM SPEC-SCNC: 7.8 MG/DL (ref 8.6–10.5)
CHLORIDE SERPL-SCNC: 111 MMOL/L (ref 98–107)
CO2 SERPL-SCNC: 23.4 MMOL/L (ref 22–29)
CREAT SERPL-MCNC: 1.18 MG/DL (ref 0.76–1.27)
DACRYOCYTES BLD QL SMEAR: NORMAL
DEPRECATED RDW RBC AUTO: 73.5 FL (ref 37–54)
EGFRCR SERPLBLD CKD-EPI 2021: 71.5 ML/MIN/1.73
EOSINOPHIL # BLD AUTO: 0.11 10*3/MM3 (ref 0–0.4)
EOSINOPHIL NFR BLD AUTO: 4.2 % (ref 0.3–6.2)
ERYTHROCYTE [DISTWIDTH] IN BLOOD BY AUTOMATED COUNT: 21.9 % (ref 12.3–15.4)
GLOBULIN UR ELPH-MCNC: 2.2 GM/DL
GLUCOSE BLDC GLUCOMTR-MCNC: 154 MG/DL (ref 70–99)
GLUCOSE BLDC GLUCOMTR-MCNC: 155 MG/DL (ref 70–99)
GLUCOSE BLDC GLUCOMTR-MCNC: 171 MG/DL (ref 70–99)
GLUCOSE BLDC GLUCOMTR-MCNC: 202 MG/DL (ref 70–99)
GLUCOSE SERPL-MCNC: 176 MG/DL (ref 65–99)
HCT VFR BLD AUTO: 22.8 % (ref 37.5–51)
HGB BLD-MCNC: 6.9 G/DL (ref 13–17.7)
HYPOCHROMIA BLD QL: NORMAL
IMM GRANULOCYTES # BLD AUTO: 0 10*3/MM3 (ref 0–0.05)
IMM GRANULOCYTES NFR BLD AUTO: 0 % (ref 0–0.5)
LYMPHOCYTES # BLD AUTO: 0.55 10*3/MM3 (ref 0.7–3.1)
LYMPHOCYTES NFR BLD AUTO: 21 % (ref 19.6–45.3)
MAGNESIUM SERPL-MCNC: 1.6 MG/DL (ref 1.6–2.6)
MCH RBC QN AUTO: 27.7 PG (ref 26.6–33)
MCHC RBC AUTO-ENTMCNC: 30.3 G/DL (ref 31.5–35.7)
MCV RBC AUTO: 91.6 FL (ref 79–97)
MONOCYTES # BLD AUTO: 0.36 10*3/MM3 (ref 0.1–0.9)
MONOCYTES NFR BLD AUTO: 13.7 % (ref 5–12)
NEUTROPHILS NFR BLD AUTO: 1.58 10*3/MM3 (ref 1.7–7)
NEUTROPHILS NFR BLD AUTO: 60.3 % (ref 42.7–76)
NRBC BLD AUTO-RTO: 0 /100 WBC (ref 0–0.2)
OVALOCYTES BLD QL SMEAR: NORMAL
PLATELET # BLD AUTO: 47 10*3/MM3 (ref 140–450)
PMV BLD AUTO: 10.5 FL (ref 6–12)
POTASSIUM SERPL-SCNC: 3.6 MMOL/L (ref 3.5–5.2)
PROT SERPL-MCNC: 4.9 G/DL (ref 6–8.5)
RBC # BLD AUTO: 2.49 10*6/MM3 (ref 4.14–5.8)
SMALL PLATELETS BLD QL SMEAR: NORMAL
SODIUM SERPL-SCNC: 143 MMOL/L (ref 136–145)
WBC MORPH BLD: NORMAL
WBC NRBC COR # BLD AUTO: 2.62 10*3/MM3 (ref 3.4–10.8)

## 2024-10-30 PROCEDURE — 86900 BLOOD TYPING SEROLOGIC ABO: CPT

## 2024-10-30 PROCEDURE — 83735 ASSAY OF MAGNESIUM: CPT | Performed by: INTERNAL MEDICINE

## 2024-10-30 PROCEDURE — 25010000002 FUROSEMIDE PER 20 MG: Performed by: INTERNAL MEDICINE

## 2024-10-30 PROCEDURE — 85025 COMPLETE CBC W/AUTO DIFF WBC: CPT | Performed by: INTERNAL MEDICINE

## 2024-10-30 PROCEDURE — 99222 1ST HOSP IP/OBS MODERATE 55: CPT | Performed by: INTERNAL MEDICINE

## 2024-10-30 PROCEDURE — 36430 TRANSFUSION BLD/BLD COMPNT: CPT

## 2024-10-30 PROCEDURE — 82948 REAGENT STRIP/BLOOD GLUCOSE: CPT | Performed by: INTERNAL MEDICINE

## 2024-10-30 PROCEDURE — 25010000002 PHYTONADIONE 10 MG/ML SOLUTION 1 ML AMPULE: Performed by: INTERNAL MEDICINE

## 2024-10-30 PROCEDURE — 86923 COMPATIBILITY TEST ELECTRIC: CPT

## 2024-10-30 PROCEDURE — 63710000001 INSULIN LISPRO (HUMAN) PER 5 UNITS: Performed by: INTERNAL MEDICINE

## 2024-10-30 PROCEDURE — 82948 REAGENT STRIP/BLOOD GLUCOSE: CPT

## 2024-10-30 PROCEDURE — P9016 RBC LEUKOCYTES REDUCED: HCPCS

## 2024-10-30 PROCEDURE — 97161 PT EVAL LOW COMPLEX 20 MIN: CPT

## 2024-10-30 PROCEDURE — 85007 BL SMEAR W/DIFF WBC COUNT: CPT | Performed by: INTERNAL MEDICINE

## 2024-10-30 PROCEDURE — 85730 THROMBOPLASTIN TIME PARTIAL: CPT | Performed by: INTERNAL MEDICINE

## 2024-10-30 PROCEDURE — 99233 SBSQ HOSP IP/OBS HIGH 50: CPT | Performed by: INTERNAL MEDICINE

## 2024-10-30 PROCEDURE — 80053 COMPREHEN METABOLIC PANEL: CPT | Performed by: INTERNAL MEDICINE

## 2024-10-30 PROCEDURE — 97165 OT EVAL LOW COMPLEX 30 MIN: CPT

## 2024-10-30 RX ORDER — SIMETHICONE 80 MG
80 TABLET,CHEWABLE ORAL
Status: DISCONTINUED | OUTPATIENT
Start: 2024-10-30 | End: 2024-11-04 | Stop reason: HOSPADM

## 2024-10-30 RX ORDER — PANTOPRAZOLE SODIUM 40 MG/1
40 TABLET, DELAYED RELEASE ORAL DAILY
Status: DISCONTINUED | OUTPATIENT
Start: 2024-10-30 | End: 2024-11-04 | Stop reason: HOSPADM

## 2024-10-30 RX ORDER — OXYCODONE HYDROCHLORIDE 5 MG/1
10 TABLET ORAL EVERY 8 HOURS PRN
Status: DISCONTINUED | OUTPATIENT
Start: 2024-10-30 | End: 2024-11-04 | Stop reason: HOSPADM

## 2024-10-30 RX ORDER — CHOLECALCIFEROL (VITAMIN D3) 25 MCG
2000 TABLET ORAL DAILY
Status: DISCONTINUED | OUTPATIENT
Start: 2024-10-30 | End: 2024-11-04 | Stop reason: HOSPADM

## 2024-10-30 RX ORDER — SUCRALFATE 1 G/1
1 TABLET ORAL 4 TIMES DAILY
Status: DISCONTINUED | OUTPATIENT
Start: 2024-10-30 | End: 2024-11-04 | Stop reason: HOSPADM

## 2024-10-30 RX ADMIN — OXYCODONE 10 MG: 5 TABLET ORAL at 21:25

## 2024-10-30 RX ADMIN — BUSPIRONE HYDROCHLORIDE 7.5 MG: 15 TABLET ORAL at 09:50

## 2024-10-30 RX ADMIN — SERTRALINE HYDROCHLORIDE 100 MG: 100 TABLET ORAL at 21:25

## 2024-10-30 RX ADMIN — Medication 2000 UNITS: at 12:05

## 2024-10-30 RX ADMIN — LEVETIRACETAM 500 MG: 500 TABLET, FILM COATED ORAL at 09:50

## 2024-10-30 RX ADMIN — RIFAXIMIN 550 MG: 550 TABLET ORAL at 07:52

## 2024-10-30 RX ADMIN — LEVETIRACETAM 500 MG: 500 TABLET, FILM COATED ORAL at 21:25

## 2024-10-30 RX ADMIN — CARVEDILOL 25 MG: 25 TABLET, FILM COATED ORAL at 17:20

## 2024-10-30 RX ADMIN — INSULIN LISPRO 2 UNITS: 100 INJECTION, SOLUTION INTRAVENOUS; SUBCUTANEOUS at 12:05

## 2024-10-30 RX ADMIN — Medication 10 ML: at 21:26

## 2024-10-30 RX ADMIN — INSULIN LISPRO 2 UNITS: 100 INJECTION, SOLUTION INTRAVENOUS; SUBCUTANEOUS at 21:25

## 2024-10-30 RX ADMIN — FUROSEMIDE 40 MG: 10 INJECTION, SOLUTION INTRAMUSCULAR; INTRAVENOUS at 09:50

## 2024-10-30 RX ADMIN — INSULIN LISPRO 3 UNITS: 100 INJECTION, SOLUTION INTRAVENOUS; SUBCUTANEOUS at 17:19

## 2024-10-30 RX ADMIN — Medication 10 ML: at 09:51

## 2024-10-30 RX ADMIN — SIMETHICONE 80 MG: 80 TABLET, CHEWABLE ORAL at 12:05

## 2024-10-30 RX ADMIN — TRAMADOL HYDROCHLORIDE 50 MG: 50 TABLET, COATED ORAL at 01:39

## 2024-10-30 RX ADMIN — OXYCODONE 10 MG: 5 TABLET ORAL at 12:04

## 2024-10-30 RX ADMIN — LACTULOSE 30 G: 10 SOLUTION ORAL at 12:04

## 2024-10-30 RX ADMIN — SUCRALFATE 1 G: 1 TABLET ORAL at 17:20

## 2024-10-30 RX ADMIN — CARVEDILOL 25 MG: 25 TABLET, FILM COATED ORAL at 07:52

## 2024-10-30 RX ADMIN — RIFAXIMIN 550 MG: 550 TABLET ORAL at 21:25

## 2024-10-30 RX ADMIN — PANTOPRAZOLE SODIUM 40 MG: 40 TABLET, DELAYED RELEASE ORAL at 12:05

## 2024-10-30 RX ADMIN — FUROSEMIDE 40 MG: 10 INJECTION, SOLUTION INTRAMUSCULAR; INTRAVENOUS at 17:20

## 2024-10-30 RX ADMIN — BUSPIRONE HYDROCHLORIDE 7.5 MG: 15 TABLET ORAL at 21:25

## 2024-10-30 RX ADMIN — LACTULOSE 30 G: 10 SOLUTION ORAL at 07:53

## 2024-10-30 RX ADMIN — PHYTONADIONE 10 MG: 10 INJECTION, EMULSION INTRAMUSCULAR; INTRAVENOUS; SUBCUTANEOUS at 16:45

## 2024-10-30 RX ADMIN — OXYCODONE 10 MG: 5 TABLET ORAL at 03:41

## 2024-10-30 RX ADMIN — SUCRALFATE 1 G: 1 TABLET ORAL at 12:04

## 2024-10-30 RX ADMIN — INSULIN LISPRO 2 UNITS: 100 INJECTION, SOLUTION INTRAVENOUS; SUBCUTANEOUS at 07:52

## 2024-10-30 RX ADMIN — SUCRALFATE 1 G: 1 TABLET ORAL at 21:25

## 2024-10-30 RX ADMIN — SIMETHICONE 80 MG: 80 TABLET, CHEWABLE ORAL at 17:19

## 2024-10-30 RX ADMIN — BUSPIRONE HYDROCHLORIDE 7.5 MG: 15 TABLET ORAL at 16:45

## 2024-10-30 NOTE — PLAN OF CARE
Goal Outcome Evaluation:  Plan of Care Reviewed With: (P) patient        Progress: (P) improving  Outcome Evaluation: (P) Pt presents to treatment with complaints of fatigue and weakness in BLE. Was able to ambulate with minimal difficulty and slight complaints of fatigue. Recommend discharge to nursing home at this time. Pt would benefit from ambulation with nursing staff to promote activity until discharge.    Anticipated Discharge Disposition (PT): (P) other (see comments) (Nursing home)

## 2024-10-30 NOTE — PROGRESS NOTES
[facility]   Hospitalist Progress Note  Date: 10/30/2024  Patient Name: Nic Nicolas  : 1965  MRN: [id number]  Date of admission: 10/29/2024  Room/Bed: [facility]/      Subjective   Subjective     Chief Complaint:   Abdominal distention    Summary:  Nic Nicolas is a 59 y.o. male with medical history of ROMO cirrhosis with history of hepatic encephalopathy and decompensated cirrhosis, type 2 diabetes, obstructive sleep apnea, TBI, and COPD who is admitted from GI clinic due to decompensated cirrhosis     Patient was seen in gastroenterology clinic on 10/29/2024.  Noted he was up almost 30 pounds with severe lower extremity swelling, abdominal distention, orthopnea, and PND.  There is long conversation with the GI doctor about goals of care and was decided the patient would not pursue CPR if something were to happen.  However, the patient is so far behind on his volume status he needed to be admitted to the hospital for IV Lasix and fluid management.  Patient will need a paracentesis.  As result, patient was admitted to hospital to manage his volume status from decompensated cirrhosis.    Interval Followup:   Patient states already feeling symptomatically improved.  Hemoglobin this morning down to 6.9 therefore transfusing 1 unit packed RBCs.  Patient received paracentesis hopefully later this afternoon.  Other home medications reviewed and resumed as indicated    Objective   Objective     Vitals:   Temp:  [97.2 °F (36.2 °C)-98.4 °F (36.9 °C)] 98.3 °F (36.8 °C)  Heart Rate:  [64-71] 71  Resp:  [18-22] 18  BP: (117-155)/(48-77) 130/48    Physical Exam               Constitutional: Older than stated age, resting in bed              Eyes: Pupils equal, sclerae anicteric, no conjunctival injection              HENT: NCAT, mucous membranes moist              Neck: Supple, no thyromegaly, no lymphadenopathy, trachea midline              Respiratory: Clear to auscultation bilaterally, nonlabored respirations                Cardiovascular: RRR, no murmurs, rubs, or gallops, palpable pedal pulses bilaterally              Gastrointestinal: Distended, no tenderness no rebound no guarding              Musculoskeletal: 3+ pitting edema, no clubbing or cyanosis to extremities              Psychiatric: Appropriate affect, cooperative              Neurologic: Oriented x 3, strength symmetric in all extremities, Cranial Nerves grossly intact to confrontation, speech clear.  No flapping asterixis, good concentration.  Able to recite days of the week backwards              Skin: No rashes     Result Review    Result Review:  I have personally reviewed these results:  [x]  Laboratory      Lab 10/30/24  0940 10/29/24  1642   WBC 2.62* 3.75   HEMOGLOBIN 6.9* 7.3*   HEMATOCRIT 22.8* 24.4*   PLATELETS 47* 62*   NEUTROS ABS 1.58* 2.60   IMMATURE GRANS (ABS) 0.00 0.01   LYMPHS ABS 0.55* 0.49*   MONOS ABS 0.36 0.49   EOS ABS 0.11 0.13   MCV 91.6 91.7   PROTIME  --  24.0*   APTT 40.1*  --          Lab 10/30/24  0940 10/29/24  1642   SODIUM 143 143   POTASSIUM 3.6 3.4*   CHLORIDE 111* 109*   CO2 23.4 23.9   ANION GAP 8.6 10.1   BUN 30* 29*   CREATININE 1.18 1.17   EGFR 71.5 72.3   GLUCOSE 176* 123*   CALCIUM 7.8* 8.4*   MAGNESIUM 1.6 1.7         Lab 10/30/24  0940 10/29/24  1642   TOTAL PROTEIN 4.9* 5.5*   ALBUMIN 2.7* 3.1*   GLOBULIN 2.2 2.4   ALT (SGPT) 24 28   AST (SGOT) 40 48*   BILIRUBIN 1.1 1.4*   ALK PHOS 129* 145*         Lab 10/29/24  1642   PROTIME 24.0*   INR 2.11*             Lab 10/29/24  1642   ABO TYPING A   RH TYPING Positive   ANTIBODY SCREEN Negative         Brief Urine Lab Results  (Last result in the past 365 days)        Color   Clarity   Blood   Leuk Est   Nitrite   Protein   CREAT   Urine HCG        09/29/24 0419 Yellow   Clear   Negative   Trace   Negative   Negative                 [x]  Microbiology   Microbiology Results (last 10 days)       ** No results found for the last 240 hours. **          [x]  Radiology  No  radiology results for the last 7 days  []  EKG/Telemetry   []  Cardiology/Vascular   []  Pathology  []  Old records  []  Other:    Assessment & Plan   Assessment / Plan     Assessment:  Decompensated cirrhosis complicated by coagulopathy, thrombocytopenia  Acute on chronic anemia  Thrombocytopenia  History of hepatic encephalopathy  Type 2 diabetes  Hypokalemia  TBI  Seizure disorder     Plan:  Patient remains admitted to hospital for further care and management  Continue on scheduled IV diuresis  Continue to monitor renal function and electrolytes closely while receiving IV diuretics  Patient scheduled for paracentesis later today  Patient will receive 1 unit packed RBCs, hemoglobin down to 6.9 this morning.  7.3 yesterday  Continue to monitor patient's thrombocytopenia, chronic issue for patient  Other home medications reviewed and resumed as indicated      Discussed with RN.    VTE Prophylaxis:  Mechanical VTE prophylaxis orders are present.        CODE STATUS:   Level Of Support Discussed With: Patient  Code Status (Patient has no pulse and is not breathing): No CPR (Do Not Attempt to Resuscitate)  Medical Interventions (Patient has pulse or is breathing): Full Support      Electronically signed by Odell Soliz MD, 10/30/2024, 14:13 EDT.
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
## 2024-10-30 NOTE — PLAN OF CARE
Goal Outcome Evaluation:  Plan of Care Reviewed With: patient        Progress: improving  Outcome Evaluation: Patient states he feels much better today then when he came into the hospital. Hgb 6.9, MD notified and 1un of PRBC given. SSI given per MAR. Patient is suppose to have a paracentesis tomorrow. VSS, will continue plan of care.

## 2024-10-30 NOTE — PLAN OF CARE
Goal Outcome Evaluation:              Outcome Evaluation: VSS. Pt able to get home meds and pain meds restarted. Pt urinated 750+mL overnight.
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
## 2024-10-30 NOTE — PLAN OF CARE
Goal Outcome Evaluation:  Plan of Care Reviewed With: patient        Progress: no change (First session for evaluation)  Outcome Evaluation: Patient presents with limitations of balance, strength and endurance/activity tolerance which impede his ability to perform ADL and transfers as prior.  The skills of a therapist will be required to safely and effectively implement treatment plan to restore maximal level of function.    Anticipated Discharge Disposition (OT):  (Return to long-term care with subacute rehab services)
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
## 2024-10-31 ENCOUNTER — APPOINTMENT (OUTPATIENT)
Dept: INTERVENTIONAL RADIOLOGY/VASCULAR | Facility: HOSPITAL | Age: 59
DRG: 433 | End: 2024-10-31
Payer: MEDICAID

## 2024-10-31 LAB
ALBUMIN SERPL-MCNC: 2.7 G/DL (ref 3.5–5.2)
ALBUMIN/GLOB SERPL: 1.1 G/DL
ALP SERPL-CCNC: 138 U/L (ref 39–117)
ALT SERPL W P-5'-P-CCNC: 23 U/L (ref 1–41)
ANION GAP SERPL CALCULATED.3IONS-SCNC: 10.8 MMOL/L (ref 5–15)
AST SERPL-CCNC: 42 U/L (ref 1–40)
BASOPHILS # BLD AUTO: 0.03 10*3/MM3 (ref 0–0.2)
BASOPHILS NFR BLD AUTO: 1.1 % (ref 0–1.5)
BH BB BLOOD EXPIRATION DATE: NORMAL
BH BB BLOOD TYPE BARCODE: NORMAL
BH BB DISPENSE STATUS: NORMAL
BH BB PRODUCT CODE: NORMAL
BH BB UNIT NUMBER: NORMAL
BILIRUB SERPL-MCNC: 1.2 MG/DL (ref 0–1.2)
BUN SERPL-MCNC: 28 MG/DL (ref 6–20)
BUN/CREAT SERPL: 25.2 (ref 7–25)
CALCIUM SPEC-SCNC: 8.1 MG/DL (ref 8.6–10.5)
CHLORIDE SERPL-SCNC: 108 MMOL/L (ref 98–107)
CO2 SERPL-SCNC: 20.2 MMOL/L (ref 22–29)
CREAT SERPL-MCNC: 1.11 MG/DL (ref 0.76–1.27)
CROSSMATCH INTERPRETATION: NORMAL
DEPRECATED RDW RBC AUTO: 71.4 FL (ref 37–54)
EGFRCR SERPLBLD CKD-EPI 2021: 77 ML/MIN/1.73
EOSINOPHIL # BLD AUTO: 0.1 10*3/MM3 (ref 0–0.4)
EOSINOPHIL NFR BLD AUTO: 3.6 % (ref 0.3–6.2)
ERYTHROCYTE [DISTWIDTH] IN BLOOD BY AUTOMATED COUNT: 21.7 % (ref 12.3–15.4)
GLOBULIN UR ELPH-MCNC: 2.4 GM/DL
GLUCOSE BLDC GLUCOMTR-MCNC: 162 MG/DL (ref 70–99)
GLUCOSE BLDC GLUCOMTR-MCNC: 173 MG/DL (ref 70–99)
GLUCOSE BLDC GLUCOMTR-MCNC: 181 MG/DL (ref 70–99)
GLUCOSE SERPL-MCNC: 224 MG/DL (ref 65–99)
HCT VFR BLD AUTO: 25.4 % (ref 37.5–51)
HGB BLD-MCNC: 7.7 G/DL (ref 13–17.7)
IMM GRANULOCYTES # BLD AUTO: 0.01 10*3/MM3 (ref 0–0.05)
IMM GRANULOCYTES NFR BLD AUTO: 0.4 % (ref 0–0.5)
INR PPP: 2.14 (ref 0.86–1.15)
LYMPHOCYTES # BLD AUTO: 0.5 10*3/MM3 (ref 0.7–3.1)
LYMPHOCYTES NFR BLD AUTO: 17.9 % (ref 19.6–45.3)
MAGNESIUM SERPL-MCNC: 1.5 MG/DL (ref 1.6–2.6)
MCH RBC QN AUTO: 27.4 PG (ref 26.6–33)
MCHC RBC AUTO-ENTMCNC: 30.3 G/DL (ref 31.5–35.7)
MCV RBC AUTO: 90.4 FL (ref 79–97)
MONOCYTES # BLD AUTO: 0.31 10*3/MM3 (ref 0.1–0.9)
MONOCYTES NFR BLD AUTO: 11.1 % (ref 5–12)
NEUTROPHILS NFR BLD AUTO: 1.85 10*3/MM3 (ref 1.7–7)
NEUTROPHILS NFR BLD AUTO: 65.9 % (ref 42.7–76)
NRBC BLD AUTO-RTO: 0 /100 WBC (ref 0–0.2)
PHOSPHATE SERPL-MCNC: 2.9 MG/DL (ref 2.5–4.5)
PLATELET # BLD AUTO: 44 10*3/MM3 (ref 140–450)
PMV BLD AUTO: 11.7 FL (ref 6–12)
POTASSIUM SERPL-SCNC: 3.5 MMOL/L (ref 3.5–5.2)
PROT SERPL-MCNC: 5.1 G/DL (ref 6–8.5)
PROTHROMBIN TIME: 24.3 SECONDS (ref 11.8–14.9)
RBC # BLD AUTO: 2.81 10*6/MM3 (ref 4.14–5.8)
RBC MORPH BLD: NORMAL
SMALL PLATELETS BLD QL SMEAR: NORMAL
SODIUM SERPL-SCNC: 139 MMOL/L (ref 136–145)
UNIT  ABO: NORMAL
UNIT  RH: NORMAL
WBC MORPH BLD: NORMAL
WBC NRBC COR # BLD AUTO: 2.8 10*3/MM3 (ref 3.4–10.8)

## 2024-10-31 PROCEDURE — 83735 ASSAY OF MAGNESIUM: CPT | Performed by: INTERNAL MEDICINE

## 2024-10-31 PROCEDURE — 85025 COMPLETE CBC W/AUTO DIFF WBC: CPT | Performed by: INTERNAL MEDICINE

## 2024-10-31 PROCEDURE — 85610 PROTHROMBIN TIME: CPT | Performed by: INTERNAL MEDICINE

## 2024-10-31 PROCEDURE — 82948 REAGENT STRIP/BLOOD GLUCOSE: CPT

## 2024-10-31 PROCEDURE — 25010000002 MAGNESIUM SULFATE 4 GM/100ML SOLUTION: Performed by: INTERNAL MEDICINE

## 2024-10-31 PROCEDURE — 63710000001 INSULIN LISPRO (HUMAN) PER 5 UNITS: Performed by: INTERNAL MEDICINE

## 2024-10-31 PROCEDURE — 80053 COMPREHEN METABOLIC PANEL: CPT | Performed by: INTERNAL MEDICINE

## 2024-10-31 PROCEDURE — 99232 SBSQ HOSP IP/OBS MODERATE 35: CPT | Performed by: INTERNAL MEDICINE

## 2024-10-31 PROCEDURE — 84100 ASSAY OF PHOSPHORUS: CPT | Performed by: INTERNAL MEDICINE

## 2024-10-31 PROCEDURE — 76942 ECHO GUIDE FOR BIOPSY: CPT

## 2024-10-31 PROCEDURE — 82948 REAGENT STRIP/BLOOD GLUCOSE: CPT | Performed by: INTERNAL MEDICINE

## 2024-10-31 PROCEDURE — 97530 THERAPEUTIC ACTIVITIES: CPT

## 2024-10-31 PROCEDURE — 25010000002 FUROSEMIDE PER 20 MG: Performed by: INTERNAL MEDICINE

## 2024-10-31 PROCEDURE — 85007 BL SMEAR W/DIFF WBC COUNT: CPT | Performed by: INTERNAL MEDICINE

## 2024-10-31 RX ORDER — MAGNESIUM SULFATE HEPTAHYDRATE 40 MG/ML
4 INJECTION, SOLUTION INTRAVENOUS ONCE
Status: COMPLETED | OUTPATIENT
Start: 2024-10-31 | End: 2024-10-31

## 2024-10-31 RX ORDER — POTASSIUM CHLORIDE 750 MG/1
40 CAPSULE, EXTENDED RELEASE ORAL ONCE
Status: COMPLETED | OUTPATIENT
Start: 2024-10-31 | End: 2024-10-31

## 2024-10-31 RX ADMIN — FUROSEMIDE 40 MG: 10 INJECTION, SOLUTION INTRAMUSCULAR; INTRAVENOUS at 17:15

## 2024-10-31 RX ADMIN — LACTULOSE 30 G: 10 SOLUTION ORAL at 07:31

## 2024-10-31 RX ADMIN — SIMETHICONE 80 MG: 80 TABLET, CHEWABLE ORAL at 11:55

## 2024-10-31 RX ADMIN — INSULIN LISPRO 2 UNITS: 100 INJECTION, SOLUTION INTRAVENOUS; SUBCUTANEOUS at 11:55

## 2024-10-31 RX ADMIN — LEVETIRACETAM 500 MG: 500 TABLET, FILM COATED ORAL at 21:25

## 2024-10-31 RX ADMIN — CARVEDILOL 25 MG: 25 TABLET, FILM COATED ORAL at 17:15

## 2024-10-31 RX ADMIN — SIMETHICONE 80 MG: 80 TABLET, CHEWABLE ORAL at 07:31

## 2024-10-31 RX ADMIN — INSULIN LISPRO 2 UNITS: 100 INJECTION, SOLUTION INTRAVENOUS; SUBCUTANEOUS at 07:46

## 2024-10-31 RX ADMIN — FUROSEMIDE 40 MG: 10 INJECTION, SOLUTION INTRAMUSCULAR; INTRAVENOUS at 09:34

## 2024-10-31 RX ADMIN — BUSPIRONE HYDROCHLORIDE 7.5 MG: 15 TABLET ORAL at 09:34

## 2024-10-31 RX ADMIN — BUSPIRONE HYDROCHLORIDE 7.5 MG: 15 TABLET ORAL at 15:49

## 2024-10-31 RX ADMIN — CARVEDILOL 25 MG: 25 TABLET, FILM COATED ORAL at 07:31

## 2024-10-31 RX ADMIN — INSULIN LISPRO 2 UNITS: 100 INJECTION, SOLUTION INTRAVENOUS; SUBCUTANEOUS at 17:15

## 2024-10-31 RX ADMIN — SIMETHICONE 80 MG: 80 TABLET, CHEWABLE ORAL at 17:15

## 2024-10-31 RX ADMIN — BUSPIRONE HYDROCHLORIDE 7.5 MG: 15 TABLET ORAL at 21:25

## 2024-10-31 RX ADMIN — Medication 10 ML: at 21:26

## 2024-10-31 RX ADMIN — LEVETIRACETAM 500 MG: 500 TABLET, FILM COATED ORAL at 09:34

## 2024-10-31 RX ADMIN — PANTOPRAZOLE SODIUM 40 MG: 40 TABLET, DELAYED RELEASE ORAL at 09:34

## 2024-10-31 RX ADMIN — MAGNESIUM SULFATE HEPTAHYDRATE 4 G: 4 INJECTION, SOLUTION INTRAVENOUS at 09:35

## 2024-10-31 RX ADMIN — Medication 10 ML: at 09:34

## 2024-10-31 RX ADMIN — LACTULOSE 30 G: 10 SOLUTION ORAL at 21:25

## 2024-10-31 RX ADMIN — SERTRALINE HYDROCHLORIDE 100 MG: 100 TABLET ORAL at 21:25

## 2024-10-31 RX ADMIN — RIFAXIMIN 550 MG: 550 TABLET ORAL at 07:46

## 2024-10-31 RX ADMIN — INSULIN LISPRO 3 UNITS: 100 INJECTION, SOLUTION INTRAVENOUS; SUBCUTANEOUS at 21:32

## 2024-10-31 RX ADMIN — OXYCODONE 10 MG: 5 TABLET ORAL at 05:44

## 2024-10-31 RX ADMIN — LACTULOSE 30 G: 10 SOLUTION ORAL at 11:55

## 2024-10-31 RX ADMIN — POTASSIUM CHLORIDE 40 MEQ: 750 CAPSULE, EXTENDED RELEASE ORAL at 07:31

## 2024-10-31 RX ADMIN — OXYCODONE 10 MG: 5 TABLET ORAL at 15:04

## 2024-10-31 RX ADMIN — RIFAXIMIN 550 MG: 550 TABLET ORAL at 21:25

## 2024-10-31 RX ADMIN — SUCRALFATE 1 G: 1 TABLET ORAL at 07:31

## 2024-10-31 RX ADMIN — SUCRALFATE 1 G: 1 TABLET ORAL at 21:25

## 2024-10-31 RX ADMIN — Medication 2000 UNITS: at 09:34

## 2024-10-31 RX ADMIN — SUCRALFATE 1 G: 1 TABLET ORAL at 11:55

## 2024-10-31 RX ADMIN — SUCRALFATE 1 G: 1 TABLET ORAL at 17:15

## 2024-10-31 NOTE — PLAN OF CARE
Goal Outcome Evaluation:  Plan of Care Reviewed With: patient        Progress: no change  Outcome Evaluation: Patient c/o chronic pain; medicated per MAR. Patient had 3-4 bowel movements during shift so he refused evening lactulose. SSI given per MAR. Mg and K replaced this morning. VSS, will continue plan of care.

## 2024-10-31 NOTE — PROGRESS NOTES
[facility]  Inpatient Progress Note    Reason for Follow Up: Cirrhosis    Subjective no events overnight  Down 10 pounds of fluid weight  Hemoglobin stable but did receive 1 unit PRBC, history of GAVE    Interval History:   As above    Current Facility-Administered Medications:     busPIRone (BUSPAR) tablet 7.5 mg, 7.5 mg, Oral, TID, Daniel Berrios MD, 7.5 mg at 10/31/24 0934    carvedilol (COREG) tablet 25 mg, 25 mg, Oral, BID With Meals, Daniel Berrios MD, 25 mg at 10/31/24 0731    cholecalciferol (VITAMIN D3) tablet 2,000 Units, 2,000 Units, Oral, Daily, Odell Soliz MD, 2,000 Units at 10/31/24 0934    dextrose (D50W) (25 g/50 mL) IV injection 25 g, 25 g, Intravenous, Q15 Min PRN, Daniel Berrios MD    dextrose (GLUTOSE) oral gel 15 g, 15 g, Oral, Q15 Min PRN, Daniel Berrios MD    furosemide (LASIX) injection 40 mg, 40 mg, Intravenous, BID, Daniel Berrios MD, 40 mg at 10/31/24 0934    glucagon (GLUCAGEN) injection 1 mg, 1 mg, Intramuscular, Q15 Min PRN, Daniel Berrios MD    Insulin Lispro (humaLOG) injection 2-7 Units, 2-7 Units, Subcutaneous, 4x Daily AC & at Bedtime, Daniel Berrios MD, 2 Units at 10/31/24 1155    lactulose (CHRONULAC) 10 GM/15ML solution 30 g, 30 g, Oral, 4x Daily, Daniel Berrios MD, 30 g at 10/31/24 1155    levETIRAcetam (KEPPRA) tablet 500 mg, 500 mg, Oral, BID, Daniel Berrios MD, 500 mg at 10/31/24 0934    oxyCODONE (ROXICODONE) immediate release tablet 10 mg, 10 mg, Oral, Q8H PRN, Elia Lange MD, 10 mg at 10/31/24 0544    pantoprazole (PROTONIX) EC tablet 40 mg, 40 mg, Oral, Daily, Odell Soliz MD, 40 mg at 10/31/24 0934    riFAXIMin (XIFAXAN) tablet 550 mg, 550 mg, Oral, Q12H, Daniel Berrios MD, 550 mg at 10/31/24 0746    sertraline (ZOLOFT) tablet 100 mg, 100 mg, Oral, Nightly, Daniel Berrios MD, 100 mg at 10/30/24 2125    simethicone (MYLICON) chewable tablet 80 mg, 80 mg, Oral, TID AC, Odell Soliz MD, 80 mg at 10/31/24 1155    sodium chloride 0.9 % flush 10 mL, 10 mL, Intravenous,  Q12H, Daniel Berrios MD, 10 mL at 10/31/24 0934    sodium chloride 0.9 % flush 10 mL, 10 mL, Intravenous, PRN, Daniel Berrios MD    sucralfate (CARAFATE) tablet 1 g, 1 g, Oral, 4x Daily, Odell Soliz MD, 1 g at 10/31/24 1155  Review of Systems:    All systems were reviewed and negative except for that previously mentioned in the HPI    Objective     Vital Signs  Temp:  [97.2 °F (36.2 °C)-98.8 °F (37.1 °C)] 97.2 °F (36.2 °C)  Heart Rate:  [67-74] 70  Resp:  [18-20] 18  BP: (113-145)/(45-69) 145/66  Body mass index is 44.62 kg/m².    Intake/Output Summary (Last 24 hours) at 10/31/2024 1428  Last data filed at 10/31/2024 0850  Gross per 24 hour   Intake 930 ml   Output 700 ml   Net 230 ml     I/O this shift:  In: 236 [P.O.:236]  Out: -      Physical Exam:   General: patient awake, alert and cooperative   Eyes: Normal lids and lashes, no scleral icterus   Neck: supple, normal ROM   Skin: warm and dry, not jaundiced   Cardiovascular: regular rhythm and rate, no murmurs auscultated   Pulm: clear to auscultation bilaterally, regular and unlabored   Abdomen: soft, nontender, nondistended; normal bowel sounds   Rectal: deferred   Extremities: no rash or edema   Psychiatric: Normal mood and behavior; memory intact     Results Review:     I reviewed the patient's new clinical results.    Results from last 7 days   Lab Units 10/31/24  0512 10/30/24  0940 10/29/24  1642   WBC 10*3/mm3 2.80* 2.62* 3.75   HEMOGLOBIN g/dL 7.7* 6.9* 7.3*   HEMATOCRIT % 25.4* 22.8* 24.4*   PLATELETS 10*3/mm3 44* 47* 62*     Results from last 7 days   Lab Units 10/31/24  0512 10/30/24  0940 10/29/24  1642   SODIUM mmol/L 139 143 143   POTASSIUM mmol/L 3.5 3.6 3.4*   CHLORIDE mmol/L 108* 111* 109*   CO2 mmol/L 20.2* 23.4 23.9   BUN mg/dL 28* 30* 29*   CREATININE mg/dL 1.11 1.18 1.17   CALCIUM mg/dL 8.1* 7.8* 8.4*   BILIRUBIN mg/dL 1.2 1.1 1.4*   ALK PHOS U/L 138* 129* 145*   ALT (SGPT) U/L 23 24 28   AST (SGOT) U/L 42* 40 48*   GLUCOSE mg/dL 224* 176* 123*      Results from last 7 days   Lab Units 10/31/24  0512 10/29/24  1642   INR  2.14* 2.11*     Lab Results   Lab Value Date/Time    LIPASE 33 08/05/2024 1150    LIPASE 35 12/01/2023 1204    LIPASE 42 05/18/2023 1433    LIPASE 63 (H) 03/24/2023 1040    LIPASE 38 02/05/2023 1532    LIPASE 40 12/18/2022 0238    LIPASE 55 11/04/2022 1731    LIPASE 68 (H) 10/31/2022 0924    LIPASE 52 06/27/2022 1449    LIPASE 20 08/03/2021 1206    LIPASE 28 06/01/2021 1622       Radiology:  US Paracentesis    Result Date: 10/31/2024  Impression: Scant amount of ascitic fluid amenable to drainage Report dictated by: Keisha Gomez  I have personally reviewed this case and agree with the findings above: Electronically Signed: Perry Herndon MD  10/31/2024 9:32 AM EDT  Workstation ID: [id number]         Assessment:       Decompensated cirrhosis  Acute on chronic anemia  History of GAVE  Volume overload  Lower extremity edema    Plan:   Continue Lasix 40 mg IV twice daily  Lactulose and Xifaxan-titrate to 3-4 stools per day  Daily weights  Monitor hemoglobin.  If drops again we will repeat EGD  Status post vitamin K  Tolerating diet and ambulating  Replacing magnesium    I discussed the patients findings and my recommendations with patient.    Karlos Roblero M.D.  Gastroenterology

## 2024-10-31 NOTE — PROGRESS NOTES
[facility]   Hospitalist Progress Note  Date: 10/31/2024  Patient Name: Nic Nicolas  : 1965  MRN: [id number]  Date of admission: 10/29/2024  Room/Bed: [facility]/      Subjective   Subjective     Chief Complaint:   Abdominal distention    Summary:  Nic Nicolas is a 59 y.o. male with medical history of ROMO cirrhosis with history of hepatic encephalopathy and decompensated cirrhosis, type 2 diabetes, obstructive sleep apnea, TBI, and COPD who is admitted from GI clinic due to decompensated cirrhosis     Patient was seen in gastroenterology clinic on 10/29/2024.  Noted he was up almost 30 pounds with severe lower extremity swelling, abdominal distention, orthopnea, and PND.  There is long conversation with the GI doctor about goals of care and was decided the patient would not pursue CPR if something were to happen.  However, the patient is so far behind on his volume status he needed to be admitted to the hospital for IV Lasix and fluid management.  Patient will need a paracentesis.  As result, patient was admitted to hospital to manage his volume status from decompensated cirrhosis.    Interval Followup:   Patient went down for paracentesis today, however not large enough pockets for paracentesis.  Patient continues to diurese well.  Globin with an appropriate rise based off yesterday's transfusion, now 7.7.  Continue to push diuresis as tolerated.  Supplementing potassium and magnesium today    Objective   Objective     Vitals:   Temp:  [97.2 °F (36.2 °C)-98.8 °F (37.1 °C)] 97.9 °F (36.6 °C)  Heart Rate:  [65-74] 65  Resp:  [18-20] 18  BP: (105-145)/(45-69) 105/56    Physical Exam               Constitutional: Older than stated age, resting in bed              Eyes: Pupils equal, sclerae anicteric, no conjunctival injection              HENT: NCAT, mucous membranes moist              Neck: Supple, no thyromegaly, no lymphadenopathy, trachea midline              Respiratory: Clear to auscultation  bilaterally, nonlabored respirations               Cardiovascular: RRR, no murmurs, rubs, or gallops, palpable pedal pulses bilaterally              Gastrointestinal: Distended, no tenderness no rebound no guarding              Musculoskeletal: 3+ pitting edema, no clubbing or cyanosis to extremities              Psychiatric: Appropriate affect, cooperative              Neurologic: Oriented x 3, strength symmetric in all extremities, Cranial Nerves grossly intact to confrontation, speech clear.  No flapping asterixis, good concentration.  Able to recite days of the week backwards              Skin: No rashes     Result Review    Result Review:  I have personally reviewed these results:  [x]  Laboratory      Lab 10/31/24  0512 10/30/24  0940 10/29/24  1642   WBC 2.80* 2.62* 3.75   HEMOGLOBIN 7.7* 6.9* 7.3*   HEMATOCRIT 25.4* 22.8* 24.4*   PLATELETS 44* 47* 62*   NEUTROS ABS 1.85 1.58* 2.60   IMMATURE GRANS (ABS) 0.01 0.00 0.01   LYMPHS ABS 0.50* 0.55* 0.49*   MONOS ABS 0.31 0.36 0.49   EOS ABS 0.10 0.11 0.13   MCV 90.4 91.6 91.7   PROTIME 24.3*  --  24.0*   APTT  --  40.1*  --          Lab 10/31/24  0512 10/30/24  0940 10/29/24  1642   SODIUM 139 143 143   POTASSIUM 3.5 3.6 3.4*   CHLORIDE 108* 111* 109*   CO2 20.2* 23.4 23.9   ANION GAP 10.8 8.6 10.1   BUN 28* 30* 29*   CREATININE 1.11 1.18 1.17   EGFR 77.0 71.5 72.3   GLUCOSE 224* 176* 123*   CALCIUM 8.1* 7.8* 8.4*   MAGNESIUM 1.5* 1.6 1.7   PHOSPHORUS 2.9  --   --          Lab 10/31/24  0512 10/30/24  0940 10/29/24  1642   TOTAL PROTEIN 5.1* 4.9* 5.5*   ALBUMIN 2.7* 2.7* 3.1*   GLOBULIN 2.4 2.2 2.4   ALT (SGPT) 23 24 28   AST (SGOT) 42* 40 48*   BILIRUBIN 1.2 1.1 1.4*   ALK PHOS 138* 129* 145*         Lab 10/31/24  0512 10/29/24  1642   PROTIME 24.3* 24.0*   INR 2.14* 2.11*             Lab 10/29/24  1642   ABO TYPING A   RH TYPING Positive   ANTIBODY SCREEN Negative         Brief Urine Lab Results  (Last result in the past 365 days)        Color   Clarity   Blood    Leuk Est   Nitrite   Protein   CREAT   Urine HCG        09/29/24 0419 Yellow   Clear   Negative   Trace   Negative   Negative                 [x]  Microbiology   Microbiology Results (last 10 days)       ** No results found for the last 240 hours. **          [x]  Radiology  US Paracentesis    Result Date: 10/31/2024  Impression: Scant amount of ascitic fluid amenable to drainage Report dictated by: Keisha Gomez  I have personally reviewed this case and agree with the findings above: Electronically Signed: Perry Herndon MD  10/31/2024 9:32 AM EDT  Workstation ID: [id number]   []  EKG/Telemetry   []  Cardiology/Vascular   []  Pathology  []  Old records  []  Other:    Assessment & Plan   Assessment / Plan     Assessment:  Decompensated cirrhosis complicated by coagulopathy, thrombocytopenia  Acute on chronic anemia  Thrombocytopenia  History of hepatic encephalopathy  Type 2 diabetes  Hypokalemia  TBI  Seizure disorder     Plan:  Patient remains admitted to hospital for further care and management  Continue on scheduled IV diuresis  Continue to monitor renal function and electrolytes closely while receiving IV diuretics  Patient receiving supplementation of potassium and magnesium today  Obtained abdominal ultrasound, however not a large enough fluid collection for paracentesis  Appropriate rise in hemoglobin, continue to monitor daily  Continue to monitor patient's thrombocytopenia, chronic issue for patient  Other home medications reviewed and resumed as indicated      Discussed with RN.    VTE Prophylaxis:  Mechanical VTE prophylaxis orders are present.        CODE STATUS:   Level Of Support Discussed With: Patient  Code Status (Patient has no pulse and is not breathing): No CPR (Do Not Attempt to Resuscitate)  Medical Interventions (Patient has pulse or is breathing): Full Support      Electronically signed by Odell Soliz MD, 10/31/2024, 15:09 EDT.

## 2024-10-31 NOTE — PLAN OF CARE
Goal Outcome Evaluation:  Plan of Care Reviewed With: patient        Progress: no change  Outcome Evaluation: aox4, vss. pt stated they felt lonely and wanted someone to talk with them. was able to ambulate up and down zamora. given pain medication twice this shift. PA informed PLT was 44.
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
## 2024-11-01 LAB
ALBUMIN SERPL-MCNC: 2.8 G/DL (ref 3.5–5.2)
ALBUMIN/GLOB SERPL: 1.1 G/DL
ALP SERPL-CCNC: 139 U/L (ref 39–117)
ALT SERPL W P-5'-P-CCNC: 23 U/L (ref 1–41)
ANION GAP SERPL CALCULATED.3IONS-SCNC: 12.2 MMOL/L (ref 5–15)
AST SERPL-CCNC: 43 U/L (ref 1–40)
BILIRUB SERPL-MCNC: 1.3 MG/DL (ref 0–1.2)
BUN SERPL-MCNC: 23 MG/DL (ref 6–20)
BUN/CREAT SERPL: 23.5 (ref 7–25)
CALCIUM SPEC-SCNC: 8.2 MG/DL (ref 8.6–10.5)
CHLORIDE SERPL-SCNC: 108 MMOL/L (ref 98–107)
CO2 SERPL-SCNC: 20.8 MMOL/L (ref 22–29)
CREAT SERPL-MCNC: 0.98 MG/DL (ref 0.76–1.27)
DEPRECATED RDW RBC AUTO: 71.1 FL (ref 37–54)
EGFRCR SERPLBLD CKD-EPI 2021: 89.4 ML/MIN/1.73
ERYTHROCYTE [DISTWIDTH] IN BLOOD BY AUTOMATED COUNT: 21.2 % (ref 12.3–15.4)
GLOBULIN UR ELPH-MCNC: 2.5 GM/DL
GLUCOSE BLDC GLUCOMTR-MCNC: 130 MG/DL (ref 70–99)
GLUCOSE BLDC GLUCOMTR-MCNC: 170 MG/DL (ref 70–99)
GLUCOSE BLDC GLUCOMTR-MCNC: 181 MG/DL (ref 70–99)
GLUCOSE BLDC GLUCOMTR-MCNC: 181 MG/DL (ref 70–99)
GLUCOSE BLDC GLUCOMTR-MCNC: 248 MG/DL (ref 70–99)
GLUCOSE SERPL-MCNC: 141 MG/DL (ref 65–99)
HCT VFR BLD AUTO: 26.5 % (ref 37.5–51)
HGB BLD-MCNC: 7.8 G/DL (ref 13–17.7)
MAGNESIUM SERPL-MCNC: 2.5 MG/DL (ref 1.6–2.6)
MCH RBC QN AUTO: 26.8 PG (ref 26.6–33)
MCHC RBC AUTO-ENTMCNC: 29.4 G/DL (ref 31.5–35.7)
MCV RBC AUTO: 91.1 FL (ref 79–97)
PHOSPHATE SERPL-MCNC: 2.9 MG/DL (ref 2.5–4.5)
PLATELET # BLD AUTO: 51 10*3/MM3 (ref 140–450)
PMV BLD AUTO: 11.1 FL (ref 6–12)
POTASSIUM SERPL-SCNC: 3.5 MMOL/L (ref 3.5–5.2)
PROT SERPL-MCNC: 5.3 G/DL (ref 6–8.5)
RBC # BLD AUTO: 2.91 10*6/MM3 (ref 4.14–5.8)
SODIUM SERPL-SCNC: 141 MMOL/L (ref 136–145)
WBC NRBC COR # BLD AUTO: 3.1 10*3/MM3 (ref 3.4–10.8)

## 2024-11-01 PROCEDURE — 99232 SBSQ HOSP IP/OBS MODERATE 35: CPT | Performed by: INTERNAL MEDICINE

## 2024-11-01 PROCEDURE — 84100 ASSAY OF PHOSPHORUS: CPT | Performed by: INTERNAL MEDICINE

## 2024-11-01 PROCEDURE — 82948 REAGENT STRIP/BLOOD GLUCOSE: CPT

## 2024-11-01 PROCEDURE — 25010000002 FUROSEMIDE PER 20 MG: Performed by: INTERNAL MEDICINE

## 2024-11-01 PROCEDURE — 80053 COMPREHEN METABOLIC PANEL: CPT | Performed by: INTERNAL MEDICINE

## 2024-11-01 PROCEDURE — 83735 ASSAY OF MAGNESIUM: CPT | Performed by: INTERNAL MEDICINE

## 2024-11-01 PROCEDURE — 85027 COMPLETE CBC AUTOMATED: CPT | Performed by: INTERNAL MEDICINE

## 2024-11-01 PROCEDURE — 82948 REAGENT STRIP/BLOOD GLUCOSE: CPT | Performed by: INTERNAL MEDICINE

## 2024-11-01 PROCEDURE — 97530 THERAPEUTIC ACTIVITIES: CPT

## 2024-11-01 PROCEDURE — 63710000001 INSULIN LISPRO (HUMAN) PER 5 UNITS: Performed by: INTERNAL MEDICINE

## 2024-11-01 RX ORDER — POTASSIUM CHLORIDE 750 MG/1
40 CAPSULE, EXTENDED RELEASE ORAL ONCE
Status: COMPLETED | OUTPATIENT
Start: 2024-11-01 | End: 2024-11-01

## 2024-11-01 RX ORDER — DIPHENHYDRAMINE HYDROCHLORIDE, ZINC ACETATE 2; .1 G/100G; G/100G
1 CREAM TOPICAL 3 TIMES DAILY PRN
Status: DISCONTINUED | OUTPATIENT
Start: 2024-11-01 | End: 2024-11-04 | Stop reason: HOSPADM

## 2024-11-01 RX ADMIN — OXYCODONE 10 MG: 5 TABLET ORAL at 02:11

## 2024-11-01 RX ADMIN — SIMETHICONE 80 MG: 80 TABLET, CHEWABLE ORAL at 08:08

## 2024-11-01 RX ADMIN — BUSPIRONE HYDROCHLORIDE 7.5 MG: 15 TABLET ORAL at 20:34

## 2024-11-01 RX ADMIN — FUROSEMIDE 40 MG: 10 INJECTION, SOLUTION INTRAMUSCULAR; INTRAVENOUS at 08:07

## 2024-11-01 RX ADMIN — LACTULOSE 30 G: 10 SOLUTION ORAL at 11:46

## 2024-11-01 RX ADMIN — Medication 10 ML: at 08:08

## 2024-11-01 RX ADMIN — RIFAXIMIN 550 MG: 550 TABLET ORAL at 20:34

## 2024-11-01 RX ADMIN — POLYETHYLENE GLYCOL 400 AND PROPYLENE GLYCOL 2 DROP: 4; 3 SOLUTION/ DROPS OPHTHALMIC at 14:54

## 2024-11-01 RX ADMIN — SIMETHICONE 80 MG: 80 TABLET, CHEWABLE ORAL at 17:11

## 2024-11-01 RX ADMIN — INSULIN LISPRO 2 UNITS: 100 INJECTION, SOLUTION INTRAVENOUS; SUBCUTANEOUS at 11:53

## 2024-11-01 RX ADMIN — LACTULOSE 30 G: 10 SOLUTION ORAL at 17:11

## 2024-11-01 RX ADMIN — BUSPIRONE HYDROCHLORIDE 7.5 MG: 15 TABLET ORAL at 08:08

## 2024-11-01 RX ADMIN — RIFAXIMIN 550 MG: 550 TABLET ORAL at 08:08

## 2024-11-01 RX ADMIN — OXYCODONE 10 MG: 5 TABLET ORAL at 10:20

## 2024-11-01 RX ADMIN — INSULIN LISPRO 2 UNITS: 100 INJECTION, SOLUTION INTRAVENOUS; SUBCUTANEOUS at 17:21

## 2024-11-01 RX ADMIN — BUSPIRONE HYDROCHLORIDE 7.5 MG: 15 TABLET ORAL at 17:11

## 2024-11-01 RX ADMIN — OXYCODONE 10 MG: 5 TABLET ORAL at 18:18

## 2024-11-01 RX ADMIN — CARVEDILOL 25 MG: 25 TABLET, FILM COATED ORAL at 08:08

## 2024-11-01 RX ADMIN — FUROSEMIDE 40 MG: 10 INJECTION, SOLUTION INTRAMUSCULAR; INTRAVENOUS at 17:11

## 2024-11-01 RX ADMIN — SUCRALFATE 1 G: 1 TABLET ORAL at 20:34

## 2024-11-01 RX ADMIN — SUCRALFATE 1 G: 1 TABLET ORAL at 11:46

## 2024-11-01 RX ADMIN — Medication 2000 UNITS: at 08:07

## 2024-11-01 RX ADMIN — PANTOPRAZOLE SODIUM 40 MG: 40 TABLET, DELAYED RELEASE ORAL at 08:08

## 2024-11-01 RX ADMIN — SUCRALFATE 1 G: 1 TABLET ORAL at 17:11

## 2024-11-01 RX ADMIN — LACTULOSE 30 G: 10 SOLUTION ORAL at 08:08

## 2024-11-01 RX ADMIN — SUCRALFATE 1 G: 1 TABLET ORAL at 08:08

## 2024-11-01 RX ADMIN — LEVETIRACETAM 500 MG: 500 TABLET, FILM COATED ORAL at 20:34

## 2024-11-01 RX ADMIN — SIMETHICONE 80 MG: 80 TABLET, CHEWABLE ORAL at 11:46

## 2024-11-01 RX ADMIN — POTASSIUM CHLORIDE 40 MEQ: 750 CAPSULE, EXTENDED RELEASE ORAL at 08:07

## 2024-11-01 RX ADMIN — CARVEDILOL 25 MG: 25 TABLET, FILM COATED ORAL at 17:11

## 2024-11-01 RX ADMIN — SERTRALINE HYDROCHLORIDE 100 MG: 100 TABLET ORAL at 20:34

## 2024-11-01 RX ADMIN — Medication 10 ML: at 20:38

## 2024-11-01 RX ADMIN — LEVETIRACETAM 500 MG: 500 TABLET, FILM COATED ORAL at 08:08

## 2024-11-01 RX ADMIN — DIPHENHYDRAMINE HYDROCHLORIDE, ZINC ACETATE 1 APPLICATION: 2; .1 CREAM TOPICAL at 14:54

## 2024-11-01 RX ADMIN — INSULIN LISPRO 2 UNITS: 100 INJECTION, SOLUTION INTRAVENOUS; SUBCUTANEOUS at 20:34

## 2024-11-01 RX ADMIN — LACTULOSE 30 G: 10 SOLUTION ORAL at 20:34

## 2024-11-01 NOTE — PROGRESS NOTES
[facility]   Hospitalist Progress Note  Date: 2024  Patient Name: Nic Nicolas  : 1965  MRN: [id number]  Date of admission: 10/29/2024  Room/Bed: [facility]      Subjective   Subjective     Chief Complaint:   Abdominal distention    Summary:  Nic Nicolas is a 59 y.o. male with medical history of ROMO cirrhosis with history of hepatic encephalopathy and decompensated cirrhosis, type 2 diabetes, obstructive sleep apnea, TBI, and COPD who is admitted from GI clinic due to decompensated cirrhosis     Patient was seen in gastroenterology clinic on 10/29/2024.  Noted he was up almost 30 pounds with severe lower extremity swelling, abdominal distention, orthopnea, and PND.  There is long conversation with the GI doctor about goals of care and was decided the patient would not pursue CPR if something were to happen.  However, the patient is so far behind on his volume status he needed to be admitted to the hospital for IV Lasix and fluid management.  Patient will need a paracentesis.  As result, patient was admitted to hospital to manage his volume status from decompensated cirrhosis.    Interval Followup:   Continue to push diuresis.  Weight has been slowly coming down.  Supplementing potassium today    Objective   Objective     Vitals:   Temp:  [98 °F (36.7 °C)-98.6 °F (37 °C)] 98.3 °F (36.8 °C)  Heart Rate:  [68-74] 74  Resp:  [18] 18  BP: (109-154)/(50-82) 154/82    Physical Exam               Constitutional: Older than stated age, sitting up in bedside chair resting comfortably              Eyes: Pupils equal, sclerae anicteric, no conjunctival injection              HENT: NCAT, mucous membranes moist              Neck: Supple, no thyromegaly, no lymphadenopathy, trachea midline              Respiratory: Clear to auscultation bilaterally, nonlabored respirations               Cardiovascular: RRR, no murmurs, rubs, or gallops, palpable pedal pulses bilaterally              Gastrointestinal:  Distended, no tenderness no rebound no guarding              Musculoskeletal: 3+ pitting edema, no clubbing or cyanosis to extremities              Psychiatric: Appropriate affect, cooperative              Neurologic: Oriented x 3, strength symmetric in all extremities, Cranial Nerves grossly intact to confrontation, speech clear.  No flapping asterixis, good concentration.  Able to recite days of the week backwards              Skin: No rashes     Result Review    Result Review:  I have personally reviewed these results:  [x]  Laboratory      Lab 11/01/24  0508 10/31/24  0512 10/30/24  0940 10/29/24  1642   WBC 3.10* 2.80* 2.62* 3.75   HEMOGLOBIN 7.8* 7.7* 6.9* 7.3*   HEMATOCRIT 26.5* 25.4* 22.8* 24.4*   PLATELETS 51* 44* 47* 62*   NEUTROS ABS  --  1.85 1.58* 2.60   IMMATURE GRANS (ABS)  --  0.01 0.00 0.01   LYMPHS ABS  --  0.50* 0.55* 0.49*   MONOS ABS  --  0.31 0.36 0.49   EOS ABS  --  0.10 0.11 0.13   MCV 91.1 90.4 91.6 91.7   PROTIME  --  24.3*  --  24.0*   APTT  --   --  40.1*  --          Lab 11/01/24  0508 10/31/24  0512 10/30/24  0940   SODIUM 141 139 143   POTASSIUM 3.5 3.5 3.6   CHLORIDE 108* 108* 111*   CO2 20.8* 20.2* 23.4   ANION GAP 12.2 10.8 8.6   BUN 23* 28* 30*   CREATININE 0.98 1.11 1.18   EGFR 89.4 77.0 71.5   GLUCOSE 141* 224* 176*   CALCIUM 8.2* 8.1* 7.8*   MAGNESIUM 2.5 1.5* 1.6   PHOSPHORUS 2.9 2.9  --          Lab 11/01/24  0508 10/31/24  0512 10/30/24  0940   TOTAL PROTEIN 5.3* 5.1* 4.9*   ALBUMIN 2.8* 2.7* 2.7*   GLOBULIN 2.5 2.4 2.2   ALT (SGPT) 23 23 24   AST (SGOT) 43* 42* 40   BILIRUBIN 1.3* 1.2 1.1   ALK PHOS 139* 138* 129*         Lab 10/31/24  0512 10/29/24  1642   PROTIME 24.3* 24.0*   INR 2.14* 2.11*             Lab 10/29/24  1642   ABO TYPING A   RH TYPING Positive   ANTIBODY SCREEN Negative         Brief Urine Lab Results  (Last result in the past 365 days)        Color   Clarity   Blood   Leuk Est   Nitrite   Protein   CREAT   Urine HCG        09/29/24 0419 Yellow   Clear    Negative   Trace   Negative   Negative                 [x]  Microbiology   Microbiology Results (last 10 days)       ** No results found for the last 240 hours. **          [x]  Radiology  US Paracentesis    Result Date: 10/31/2024  Impression: Scant amount of ascitic fluid amenable to drainage Report dictated by: Keisha Gomez  I have personally reviewed this case and agree with the findings above: Electronically Signed: Perry Herndon MD  10/31/2024 9:32 AM EDT  Workstation ID: [id number]   []  EKG/Telemetry   []  Cardiology/Vascular   []  Pathology  []  Old records  []  Other:    Assessment & Plan   Assessment / Plan     Assessment:  Decompensated cirrhosis complicated by coagulopathy, thrombocytopenia  Acute on chronic anemia  Thrombocytopenia  History of hepatic encephalopathy  Type 2 diabetes  Hypokalemia  TBI  Seizure disorder     Plan:  Patient remains admitted to hospital for further care and management  Continue on scheduled IV diuresis, continue to monitor weights daily  Continue to monitor renal function and electrolytes closely while receiving IV diuretics  Pulm any potassium today  Obtained abdominal ultrasound, however not a large enough fluid collection for paracentesis  Appropriate rise in hemoglobin, continue to monitor daily  Continue to monitor patient's thrombocytopenia, chronic issue for patient  Other home medications reviewed and resumed as indicated  Discussed with gastroenterologist, will likely discharge back to facility soon, which to take advantage of inpatient admission and IV diuresis      Discussed with RN.  Discussed with gastroenterologist    VTE Prophylaxis:  Mechanical VTE prophylaxis orders are present.        CODE STATUS:   Level Of Support Discussed With: Patient  Code Status (Patient has no pulse and is not breathing): No CPR (Do Not Attempt to Resuscitate)  Medical Interventions (Patient has pulse or is breathing): Full Support      Electronically signed by Odell Soliz MD,  11/1/2024, 15:46 EDT.
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
## 2024-11-01 NOTE — PLAN OF CARE
Goal Outcome Evaluation:              Outcome Evaluation: Pt is alert and orient x4.  vS WNl for pt.  Insulin adm as ordered.  Pain managed with prn pain medications.

## 2024-11-01 NOTE — PLAN OF CARE
Goal Outcome Evaluation:  Plan of Care Reviewed With: patient        Progress: no change  Outcome Evaluation: aox4, vss. had pain medication once this shift. walked up and down the [place] through the night.

## 2024-11-01 NOTE — PROGRESS NOTES
[facility]  Inpatient Progress Note    Reason for Follow Up: Cirrhosis, GAVE, chronic anemia, volume overload    Subjective     Interval History:   No events overnight, continues to diurese  Hemoglobin improved    Current Facility-Administered Medications:     busPIRone (BUSPAR) tablet 7.5 mg, 7.5 mg, Oral, TID, Daniel Berrios MD, 7.5 mg at 11/01/24 0808    carvedilol (COREG) tablet 25 mg, 25 mg, Oral, BID With Meals, Daniel Berrios MD, 25 mg at 11/01/24 0808    cholecalciferol (VITAMIN D3) tablet 2,000 Units, 2,000 Units, Oral, Daily, Odell Soliz MD, 2,000 Units at 11/01/24 0807    dextrose (D50W) (25 g/50 mL) IV injection 25 g, 25 g, Intravenous, Q15 Min PRN, Daniel Berrios MD    dextrose (GLUTOSE) oral gel 15 g, 15 g, Oral, Q15 Min PRN, Daniel Berrios MD    furosemide (LASIX) injection 40 mg, 40 mg, Intravenous, BID, Daniel Berrios MD, 40 mg at 11/01/24 0807    glucagon (GLUCAGEN) injection 1 mg, 1 mg, Intramuscular, Q15 Min PRN, Daniel Berrios MD    Insulin Lispro (humaLOG) injection 2-7 Units, 2-7 Units, Subcutaneous, 4x Daily AC & at Bedtime, Daniel Berrios MD, 2 Units at 11/01/24 1153    lactulose (CHRONULAC) 10 GM/15ML solution 30 g, 30 g, Oral, 4x Daily, Daniel Berrios MD, 30 g at 11/01/24 1146    levETIRAcetam (KEPPRA) tablet 500 mg, 500 mg, Oral, BID, Daniel Berrios MD, 500 mg at 11/01/24 0808    oxyCODONE (ROXICODONE) immediate release tablet 10 mg, 10 mg, Oral, Q8H PRN, Elia Lange MD, 10 mg at 11/01/24 1020    pantoprazole (PROTONIX) EC tablet 40 mg, 40 mg, Oral, Daily, Odell Soliz MD, 40 mg at 11/01/24 0808    riFAXIMin (XIFAXAN) tablet 550 mg, 550 mg, Oral, Q12H, Daniel Berrios MD, 550 mg at 11/01/24 0808    sertraline (ZOLOFT) tablet 100 mg, 100 mg, Oral, Nightly, Daneil Berrios MD, 100 mg at 10/31/24 2125    simethicone (MYLICON) chewable tablet 80 mg, 80 mg, Oral, TID AC, Odell Soilz MD, 80 mg at 11/01/24 1146    sodium chloride 0.9 % flush 10 mL, 10 mL, Intravenous, Q12H, Daniel Berrios MD, 10  mL at 11/01/24 0808    sodium chloride 0.9 % flush 10 mL, 10 mL, Intravenous, PRN, Daniel Berrios MD    sucralfate (CARAFATE) tablet 1 g, 1 g, Oral, 4x Daily, Odell Soliz MD, 1 g at 11/01/24 1146  Review of Systems:    All systems were reviewed and negative except for that previously mentioned in the HPI    Objective     Vital Signs  Temp:  [97.9 °F (36.6 °C)-98.6 °F (37 °C)] 98.3 °F (36.8 °C)  Heart Rate:  [65-74] 74  Resp:  [18] 18  BP: (105-154)/(50-82) 154/82  Body mass index is 44.45 kg/m².    Intake/Output Summary (Last 24 hours) at 11/1/2024 1320  Last data filed at 11/1/2024 0840  Gross per 24 hour   Intake 712 ml   Output 200 ml   Net 512 ml     I/O this shift:  In: 236 [P.O.:236]  Out: -      Physical Exam:   General: patient awake, alert and cooperative   Eyes: Normal lids and lashes, no scleral icterus   Neck: supple, normal ROM   Skin: warm and dry, not jaundiced   Cardiovascular: regular rhythm and rate, no murmurs auscultated   Pulm: clear to auscultation bilaterally, regular and unlabored   Abdomen: soft, nontender, nondistended; normal bowel sounds   Rectal: deferred   Extremities: 2+ bilateral lower extremity edema   Psychiatric: Normal mood and behavior; memory intact     Results Review:     I reviewed the patient's new clinical results.    Results from last 7 days   Lab Units 11/01/24  0508 10/31/24  0512 10/30/24  0940   WBC 10*3/mm3 3.10* 2.80* 2.62*   HEMOGLOBIN g/dL 7.8* 7.7* 6.9*   HEMATOCRIT % 26.5* 25.4* 22.8*   PLATELETS 10*3/mm3 51* 44* 47*     Results from last 7 days   Lab Units 11/01/24  0508 10/31/24  0512 10/30/24  0940   SODIUM mmol/L 141 139 143   POTASSIUM mmol/L 3.5 3.5 3.6   CHLORIDE mmol/L 108* 108* 111*   CO2 mmol/L 20.8* 20.2* 23.4   BUN mg/dL 23* 28* 30*   CREATININE mg/dL 0.98 1.11 1.18   CALCIUM mg/dL 8.2* 8.1* 7.8*   BILIRUBIN mg/dL 1.3* 1.2 1.1   ALK PHOS U/L 139* 138* 129*   ALT (SGPT) U/L 23 23 24   AST (SGOT) U/L 43* 42* 40   GLUCOSE mg/dL 141* 224* 176*     Results  from last 7 days   Lab Units 10/31/24  0512 10/29/24  1642   INR  2.14* 2.11*     Lab Results   Lab Value Date/Time    LIPASE 33 08/05/2024 1150    LIPASE 35 12/01/2023 1204    LIPASE 42 05/18/2023 1433    LIPASE 63 (H) 03/24/2023 1040    LIPASE 38 02/05/2023 1532    LIPASE 40 12/18/2022 0238    LIPASE 55 11/04/2022 1731    LIPASE 68 (H) 10/31/2022 0924    LIPASE 52 06/27/2022 1449    LIPASE 20 08/03/2021 1206    LIPASE 28 06/01/2021 1622       Radiology:  US Paracentesis    Result Date: 10/31/2024  Impression: Scant amount of ascitic fluid amenable to drainage Report dictated by: Keisha Gomez  I have personally reviewed this case and agree with the findings above: Electronically Signed: Perry Herndon MD  10/31/2024 9:32 AM EDT  Workstation ID: [id number]         Assessment:       Decompensated cirrhosis       Plan:   Volume overload slowly improving, continue IV diuretics  Monitor hemoglobin  Continue Xifaxan and lactulose  Ambulate as tolerated  Likely return to [place] soon    I discussed the patients findings and my recommendations with patient.    Karlos Roblero M.D.  Gastroenterology

## 2024-11-02 LAB
ALBUMIN SERPL-MCNC: 3.2 G/DL (ref 3.5–5.2)
ALBUMIN/GLOB SERPL: 1.3 G/DL
ALP SERPL-CCNC: 157 U/L (ref 39–117)
ALT SERPL W P-5'-P-CCNC: 27 U/L (ref 1–41)
ANION GAP SERPL CALCULATED.3IONS-SCNC: 8.7 MMOL/L (ref 5–15)
AST SERPL-CCNC: 57 U/L (ref 1–40)
BILIRUB SERPL-MCNC: 1.1 MG/DL (ref 0–1.2)
BUN SERPL-MCNC: 23 MG/DL (ref 6–20)
BUN/CREAT SERPL: 18.5 (ref 7–25)
CALCIUM SPEC-SCNC: 8.3 MG/DL (ref 8.6–10.5)
CHLORIDE SERPL-SCNC: 107 MMOL/L (ref 98–107)
CO2 SERPL-SCNC: 24.3 MMOL/L (ref 22–29)
CREAT SERPL-MCNC: 1.24 MG/DL (ref 0.76–1.27)
DEPRECATED RDW RBC AUTO: 67.2 FL (ref 37–54)
EGFRCR SERPLBLD CKD-EPI 2021: 67.4 ML/MIN/1.73
ERYTHROCYTE [DISTWIDTH] IN BLOOD BY AUTOMATED COUNT: 21 % (ref 12.3–15.4)
GLOBULIN UR ELPH-MCNC: 2.4 GM/DL
GLUCOSE BLDC GLUCOMTR-MCNC: 136 MG/DL (ref 70–99)
GLUCOSE BLDC GLUCOMTR-MCNC: 167 MG/DL (ref 70–99)
GLUCOSE BLDC GLUCOMTR-MCNC: 200 MG/DL (ref 70–99)
GLUCOSE BLDC GLUCOMTR-MCNC: 216 MG/DL (ref 70–99)
GLUCOSE SERPL-MCNC: 161 MG/DL (ref 65–99)
HCT VFR BLD AUTO: 27.3 % (ref 37.5–51)
HGB BLD-MCNC: 8.3 G/DL (ref 13–17.7)
MAGNESIUM SERPL-MCNC: 1.7 MG/DL (ref 1.6–2.6)
MCH RBC QN AUTO: 26.9 PG (ref 26.6–33)
MCHC RBC AUTO-ENTMCNC: 30.4 G/DL (ref 31.5–35.7)
MCV RBC AUTO: 88.3 FL (ref 79–97)
PLATELET # BLD AUTO: 59 10*3/MM3 (ref 140–450)
PMV BLD AUTO: 11.4 FL (ref 6–12)
POTASSIUM SERPL-SCNC: 3.6 MMOL/L (ref 3.5–5.2)
PROT SERPL-MCNC: 5.6 G/DL (ref 6–8.5)
RBC # BLD AUTO: 3.09 10*6/MM3 (ref 4.14–5.8)
SODIUM SERPL-SCNC: 140 MMOL/L (ref 136–145)
WBC NRBC COR # BLD AUTO: 3.82 10*3/MM3 (ref 3.4–10.8)

## 2024-11-02 PROCEDURE — 25010000002 FUROSEMIDE PER 20 MG: Performed by: INTERNAL MEDICINE

## 2024-11-02 PROCEDURE — 63710000001 INSULIN LISPRO (HUMAN) PER 5 UNITS: Performed by: INTERNAL MEDICINE

## 2024-11-02 PROCEDURE — 83735 ASSAY OF MAGNESIUM: CPT | Performed by: INTERNAL MEDICINE

## 2024-11-02 PROCEDURE — 99232 SBSQ HOSP IP/OBS MODERATE 35: CPT | Performed by: INTERNAL MEDICINE

## 2024-11-02 PROCEDURE — 80053 COMPREHEN METABOLIC PANEL: CPT | Performed by: INTERNAL MEDICINE

## 2024-11-02 PROCEDURE — 82948 REAGENT STRIP/BLOOD GLUCOSE: CPT | Performed by: INTERNAL MEDICINE

## 2024-11-02 PROCEDURE — 85027 COMPLETE CBC AUTOMATED: CPT | Performed by: INTERNAL MEDICINE

## 2024-11-02 PROCEDURE — 25010000002 MAGNESIUM SULFATE 2 GM/50ML SOLUTION: Performed by: INTERNAL MEDICINE

## 2024-11-02 PROCEDURE — 82948 REAGENT STRIP/BLOOD GLUCOSE: CPT

## 2024-11-02 RX ORDER — POTASSIUM CHLORIDE 750 MG/1
40 CAPSULE, EXTENDED RELEASE ORAL ONCE
Status: COMPLETED | OUTPATIENT
Start: 2024-11-02 | End: 2024-11-02

## 2024-11-02 RX ORDER — MAGNESIUM SULFATE HEPTAHYDRATE 40 MG/ML
2 INJECTION, SOLUTION INTRAVENOUS ONCE
Status: COMPLETED | OUTPATIENT
Start: 2024-11-02 | End: 2024-11-02

## 2024-11-02 RX ORDER — METOLAZONE 5 MG/1
5 TABLET ORAL ONCE
Status: COMPLETED | OUTPATIENT
Start: 2024-11-02 | End: 2024-11-02

## 2024-11-02 RX ORDER — FUROSEMIDE 10 MG/ML
60 INJECTION INTRAMUSCULAR; INTRAVENOUS
Status: DISCONTINUED | OUTPATIENT
Start: 2024-11-02 | End: 2024-11-04 | Stop reason: HOSPADM

## 2024-11-02 RX ADMIN — SUCRALFATE 1 G: 1 TABLET ORAL at 12:08

## 2024-11-02 RX ADMIN — DIPHENHYDRAMINE HYDROCHLORIDE, ZINC ACETATE 1 APPLICATION: 2; .1 CREAM TOPICAL at 22:33

## 2024-11-02 RX ADMIN — POTASSIUM CHLORIDE 40 MEQ: 750 CAPSULE, EXTENDED RELEASE ORAL at 08:27

## 2024-11-02 RX ADMIN — RIFAXIMIN 550 MG: 550 TABLET ORAL at 08:27

## 2024-11-02 RX ADMIN — Medication 2000 UNITS: at 08:27

## 2024-11-02 RX ADMIN — RIFAXIMIN 550 MG: 550 TABLET ORAL at 20:07

## 2024-11-02 RX ADMIN — BUSPIRONE HYDROCHLORIDE 7.5 MG: 15 TABLET ORAL at 17:04

## 2024-11-02 RX ADMIN — LACTULOSE 30 G: 10 SOLUTION ORAL at 08:27

## 2024-11-02 RX ADMIN — METOLAZONE 5 MG: 5 TABLET ORAL at 08:26

## 2024-11-02 RX ADMIN — INSULIN LISPRO 3 UNITS: 100 INJECTION, SOLUTION INTRAVENOUS; SUBCUTANEOUS at 20:06

## 2024-11-02 RX ADMIN — MAGNESIUM SULFATE HEPTAHYDRATE 2 G: 40 INJECTION, SOLUTION INTRAVENOUS at 08:26

## 2024-11-02 RX ADMIN — LEVETIRACETAM 500 MG: 500 TABLET, FILM COATED ORAL at 08:27

## 2024-11-02 RX ADMIN — OXYCODONE 10 MG: 5 TABLET ORAL at 11:10

## 2024-11-02 RX ADMIN — BUSPIRONE HYDROCHLORIDE 7.5 MG: 15 TABLET ORAL at 20:07

## 2024-11-02 RX ADMIN — SUCRALFATE 1 G: 1 TABLET ORAL at 20:06

## 2024-11-02 RX ADMIN — SIMETHICONE 80 MG: 80 TABLET, CHEWABLE ORAL at 12:08

## 2024-11-02 RX ADMIN — FUROSEMIDE 60 MG: 10 INJECTION, SOLUTION INTRAMUSCULAR; INTRAVENOUS at 17:03

## 2024-11-02 RX ADMIN — PANTOPRAZOLE SODIUM 40 MG: 40 TABLET, DELAYED RELEASE ORAL at 08:27

## 2024-11-02 RX ADMIN — POLYETHYLENE GLYCOL 400 AND PROPYLENE GLYCOL 2 DROP: 4; 3 SOLUTION/ DROPS OPHTHALMIC at 21:03

## 2024-11-02 RX ADMIN — Medication 10 ML: at 08:28

## 2024-11-02 RX ADMIN — OXYCODONE 10 MG: 5 TABLET ORAL at 20:07

## 2024-11-02 RX ADMIN — CARVEDILOL 25 MG: 25 TABLET, FILM COATED ORAL at 17:04

## 2024-11-02 RX ADMIN — SIMETHICONE 80 MG: 80 TABLET, CHEWABLE ORAL at 17:04

## 2024-11-02 RX ADMIN — CARVEDILOL 25 MG: 25 TABLET, FILM COATED ORAL at 08:27

## 2024-11-02 RX ADMIN — INSULIN LISPRO 3 UNITS: 100 INJECTION, SOLUTION INTRAVENOUS; SUBCUTANEOUS at 17:03

## 2024-11-02 RX ADMIN — LACTULOSE 30 G: 10 SOLUTION ORAL at 17:03

## 2024-11-02 RX ADMIN — LACTULOSE 30 G: 10 SOLUTION ORAL at 20:06

## 2024-11-02 RX ADMIN — BUSPIRONE HYDROCHLORIDE 7.5 MG: 15 TABLET ORAL at 08:27

## 2024-11-02 RX ADMIN — SERTRALINE HYDROCHLORIDE 100 MG: 100 TABLET ORAL at 20:07

## 2024-11-02 RX ADMIN — SUCRALFATE 1 G: 1 TABLET ORAL at 08:26

## 2024-11-02 RX ADMIN — SIMETHICONE 80 MG: 80 TABLET, CHEWABLE ORAL at 08:27

## 2024-11-02 RX ADMIN — OXYCODONE 10 MG: 5 TABLET ORAL at 02:20

## 2024-11-02 RX ADMIN — FUROSEMIDE 60 MG: 10 INJECTION, SOLUTION INTRAMUSCULAR; INTRAVENOUS at 08:27

## 2024-11-02 RX ADMIN — SUCRALFATE 1 G: 1 TABLET ORAL at 17:04

## 2024-11-02 RX ADMIN — INSULIN LISPRO 2 UNITS: 100 INJECTION, SOLUTION INTRAVENOUS; SUBCUTANEOUS at 12:08

## 2024-11-02 RX ADMIN — LEVETIRACETAM 500 MG: 500 TABLET, FILM COATED ORAL at 20:06

## 2024-11-02 RX ADMIN — LACTULOSE 30 G: 10 SOLUTION ORAL at 12:08

## 2024-11-02 NOTE — PLAN OF CARE
Goal Outcome Evaluation:                    Alert and oriented x4.  Vitals within normal limits for patient.  Complained of pain and was medicated with PRN per MAR x1.  IV Magnesium sulfate per MAR.  Continuing plan of care.

## 2024-11-02 NOTE — PROGRESS NOTES
[facility]   Hospitalist Progress Note  Date: 2024  Patient Name: Nic Nicolas  : 1965  MRN: [id number]  Date of admission: 10/29/2024  Room/Bed: [facility]/      Subjective   Subjective     Chief Complaint:   Abdominal distention    Summary:  Nic Nicolas is a 59 y.o. male with medical history of ROMO cirrhosis with history of hepatic encephalopathy and decompensated cirrhosis, type 2 diabetes, obstructive sleep apnea, TBI, and COPD who is admitted from GI clinic due to decompensated cirrhosis     Patient was seen in gastroenterology clinic on 10/29/2024.  Noted he was up almost 30 pounds with severe lower extremity swelling, abdominal distention, orthopnea, and PND.  There is long conversation with the GI doctor about goals of care and was decided the patient would not pursue CPR if something were to happen.  However, the patient is so far behind on his volume status he needed to be admitted to the hospital for IV Lasix and fluid management.  Patient will need a paracentesis.  As result, patient was admitted to hospital to manage his volume status from decompensated cirrhosis.    Interval Followup:   Increasing diuretic regimen today.  Patient still vastly fluid overloaded, having good urinary output as evidenced by his dropping weight.  Renal function stable from yesterday.  Will continue to monitor daily    Objective   Objective     Vitals:   Temp:  [97.4 °F (36.3 °C)-98.4 °F (36.9 °C)] 97.5 °F (36.4 °C)  Heart Rate:  [65-72] 66  Resp:  [18-22] 20  BP: (116-142)/(59-67) 141/66    Physical Exam               Constitutional: Older than stated age, seen ambulating in the room without issue              Eyes: Pupils equal, sclerae anicteric, no conjunctival injection              HENT: NCAT, mucous membranes moist              Neck: Supple, no thyromegaly, no lymphadenopathy, trachea midline              Respiratory: Clear to auscultation bilaterally, nonlabored respirations                Cardiovascular: RRR, no murmurs, rubs, or gallops, palpable pedal pulses bilaterally              Gastrointestinal: Distended, no tenderness no rebound no guarding              Musculoskeletal: 3+ pitting edema, no clubbing or cyanosis to extremities              Psychiatric: Appropriate affect, cooperative              Neurologic: Oriented x 3, strength symmetric in all extremities, Cranial Nerves grossly intact to confrontation, speech clear.  No flapping asterixis, good concentration.  Able to recite days of the week backwards              Skin: No rashes     Result Review    Result Review:  I have personally reviewed these results:  [x]  Laboratory      Lab 11/02/24 0443 11/01/24  0508 10/31/24  0512 10/30/24  0940 10/29/24  1642   WBC 3.82 3.10* 2.80* 2.62* 3.75   HEMOGLOBIN 8.3* 7.8* 7.7* 6.9* 7.3*   HEMATOCRIT 27.3* 26.5* 25.4* 22.8* 24.4*   PLATELETS 59* 51* 44* 47* 62*   NEUTROS ABS  --   --  1.85 1.58* 2.60   IMMATURE GRANS (ABS)  --   --  0.01 0.00 0.01   LYMPHS ABS  --   --  0.50* 0.55* 0.49*   MONOS ABS  --   --  0.31 0.36 0.49   EOS ABS  --   --  0.10 0.11 0.13   MCV 88.3 91.1 90.4 91.6 91.7   PROTIME  --   --  24.3*  --  24.0*   APTT  --   --   --  40.1*  --          Lab 11/02/24 0443 11/01/24  0508 10/31/24  0512   SODIUM 140 141 139   POTASSIUM 3.6 3.5 3.5   CHLORIDE 107 108* 108*   CO2 24.3 20.8* 20.2*   ANION GAP 8.7 12.2 10.8   BUN 23* 23* 28*   CREATININE 1.24 0.98 1.11   EGFR 67.4 89.4 77.0   GLUCOSE 161* 141* 224*   CALCIUM 8.3* 8.2* 8.1*   MAGNESIUM 1.7 2.5 1.5*   PHOSPHORUS  --  2.9 2.9         Lab 11/02/24 0443 11/01/24  0508 10/31/24  0512   TOTAL PROTEIN 5.6* 5.3* 5.1*   ALBUMIN 3.2* 2.8* 2.7*   GLOBULIN 2.4 2.5 2.4   ALT (SGPT) 27 23 23   AST (SGOT) 57* 43* 42*   BILIRUBIN 1.1 1.3* 1.2   ALK PHOS 157* 139* 138*         Lab 10/31/24  0512 10/29/24  1642   PROTIME 24.3* 24.0*   INR 2.14* 2.11*             Lab 10/29/24  1642   ABO TYPING A   RH TYPING Positive   ANTIBODY SCREEN Negative          Brief Urine Lab Results  (Last result in the past 365 days)        Color   Clarity   Blood   Leuk Est   Nitrite   Protein   CREAT   Urine HCG        09/29/24 0419 Yellow   Clear   Negative   Trace   Negative   Negative                 [x]  Microbiology   Microbiology Results (last 10 days)       ** No results found for the last 240 hours. **          [x]  Radiology  US Paracentesis    Result Date: 10/31/2024  Impression: Scant amount of ascitic fluid amenable to drainage Report dictated by: Keisha Gomez  I have personally reviewed this case and agree with the findings above: Electronically Signed: Perry Herndon MD  10/31/2024 9:32 AM EDT  Workstation ID: [id number]   []  EKG/Telemetry   []  Cardiology/Vascular   []  Pathology  []  Old records  []  Other:    Assessment & Plan   Assessment / Plan     Assessment:  Decompensated cirrhosis complicated by coagulopathy, thrombocytopenia  Acute on chronic anemia  Thrombocytopenia  History of hepatic encephalopathy  Type 2 diabetes  Hypokalemia  TBI  Seizure disorder     Plan:  Patient remains admitted to hospital for further care and management  Continue on scheduled IV diuresis, continue to monitor weights daily  Will increase dosage IV diuretics today, monitoring renal function electrolytes closely  Continue to monitor strict I's and O's, daily weights  Obtained abdominal ultrasound, however not a large enough fluid collection for paracentesis  Globin has remained stable, continue to monitor daily  Continue to monitor patient's thrombocytopenia, chronic issue for patient  Other home medications reviewed and resumed as indicated  Discussed with gastroenterologist, will likely discharge back to facility soon, which to take advantage of inpatient admission and IV diuresis.      Discussed with RN.      VTE Prophylaxis:  Mechanical VTE prophylaxis orders are present.        CODE STATUS:   Level Of Support Discussed With: Patient  Code Status (Patient has no pulse and is  not breathing): No CPR (Do Not Attempt to Resuscitate)  Medical Interventions (Patient has pulse or is breathing): Full Support      Electronically signed by Odell Soliz MD, 11/2/2024, 13:19 EDT.

## 2024-11-02 NOTE — PLAN OF CARE
Goal Outcome Evaluation:  Plan of Care Reviewed With: patient        Progress: improving  Outcome Evaluation: patient alert oriented vitals stable progressing toward goals

## 2024-11-03 LAB
ALBUMIN SERPL-MCNC: 3.1 G/DL (ref 3.5–5.2)
ALBUMIN/GLOB SERPL: 1.3 G/DL
ALP SERPL-CCNC: 151 U/L (ref 39–117)
ALT SERPL W P-5'-P-CCNC: 28 U/L (ref 1–41)
ANION GAP SERPL CALCULATED.3IONS-SCNC: 10.9 MMOL/L (ref 5–15)
AST SERPL-CCNC: 56 U/L (ref 1–40)
BILIRUB SERPL-MCNC: 1.1 MG/DL (ref 0–1.2)
BUN SERPL-MCNC: 22 MG/DL (ref 6–20)
BUN/CREAT SERPL: 18.2 (ref 7–25)
CALCIUM SPEC-SCNC: 8.2 MG/DL (ref 8.6–10.5)
CHLORIDE SERPL-SCNC: 104 MMOL/L (ref 98–107)
CO2 SERPL-SCNC: 25.1 MMOL/L (ref 22–29)
CREAT SERPL-MCNC: 1.21 MG/DL (ref 0.76–1.27)
DEPRECATED RDW RBC AUTO: 66.3 FL (ref 37–54)
EGFRCR SERPLBLD CKD-EPI 2021: 69.4 ML/MIN/1.73
ERYTHROCYTE [DISTWIDTH] IN BLOOD BY AUTOMATED COUNT: 20.6 % (ref 12.3–15.4)
GLOBULIN UR ELPH-MCNC: 2.3 GM/DL
GLUCOSE BLDC GLUCOMTR-MCNC: 177 MG/DL (ref 70–99)
GLUCOSE BLDC GLUCOMTR-MCNC: 182 MG/DL (ref 70–99)
GLUCOSE BLDC GLUCOMTR-MCNC: 205 MG/DL (ref 70–99)
GLUCOSE BLDC GLUCOMTR-MCNC: 230 MG/DL (ref 70–99)
GLUCOSE SERPL-MCNC: 184 MG/DL (ref 65–99)
HCT VFR BLD AUTO: 26 % (ref 37.5–51)
HGB BLD-MCNC: 8 G/DL (ref 13–17.7)
MAGNESIUM SERPL-MCNC: 1.7 MG/DL (ref 1.6–2.6)
MCH RBC QN AUTO: 26.8 PG (ref 26.6–33)
MCHC RBC AUTO-ENTMCNC: 30.8 G/DL (ref 31.5–35.7)
MCV RBC AUTO: 87.2 FL (ref 79–97)
PLATELET # BLD AUTO: 64 10*3/MM3 (ref 140–450)
PMV BLD AUTO: 11.5 FL (ref 6–12)
POTASSIUM SERPL-SCNC: 3.3 MMOL/L (ref 3.5–5.2)
PROT SERPL-MCNC: 5.4 G/DL (ref 6–8.5)
RBC # BLD AUTO: 2.98 10*6/MM3 (ref 4.14–5.8)
SODIUM SERPL-SCNC: 140 MMOL/L (ref 136–145)
WBC NRBC COR # BLD AUTO: 3.83 10*3/MM3 (ref 3.4–10.8)

## 2024-11-03 PROCEDURE — 63710000001 INSULIN LISPRO (HUMAN) PER 5 UNITS: Performed by: INTERNAL MEDICINE

## 2024-11-03 PROCEDURE — 25010000002 MAGNESIUM SULFATE 2 GM/50ML SOLUTION: Performed by: INTERNAL MEDICINE

## 2024-11-03 PROCEDURE — 25010000002 FUROSEMIDE PER 20 MG: Performed by: INTERNAL MEDICINE

## 2024-11-03 PROCEDURE — 82948 REAGENT STRIP/BLOOD GLUCOSE: CPT | Performed by: INTERNAL MEDICINE

## 2024-11-03 PROCEDURE — 80053 COMPREHEN METABOLIC PANEL: CPT | Performed by: INTERNAL MEDICINE

## 2024-11-03 PROCEDURE — 83735 ASSAY OF MAGNESIUM: CPT | Performed by: INTERNAL MEDICINE

## 2024-11-03 PROCEDURE — 99232 SBSQ HOSP IP/OBS MODERATE 35: CPT | Performed by: INTERNAL MEDICINE

## 2024-11-03 PROCEDURE — 85027 COMPLETE CBC AUTOMATED: CPT | Performed by: INTERNAL MEDICINE

## 2024-11-03 PROCEDURE — 82948 REAGENT STRIP/BLOOD GLUCOSE: CPT

## 2024-11-03 RX ORDER — POTASSIUM CHLORIDE 750 MG/1
40 CAPSULE, EXTENDED RELEASE ORAL EVERY 4 HOURS
Status: COMPLETED | OUTPATIENT
Start: 2024-11-03 | End: 2024-11-03

## 2024-11-03 RX ORDER — METOLAZONE 5 MG/1
5 TABLET ORAL ONCE
Status: COMPLETED | OUTPATIENT
Start: 2024-11-03 | End: 2024-11-03

## 2024-11-03 RX ORDER — MAGNESIUM SULFATE HEPTAHYDRATE 40 MG/ML
2 INJECTION, SOLUTION INTRAVENOUS ONCE
Status: COMPLETED | OUTPATIENT
Start: 2024-11-03 | End: 2024-11-03

## 2024-11-03 RX ADMIN — Medication 10 ML: at 20:23

## 2024-11-03 RX ADMIN — LEVETIRACETAM 500 MG: 500 TABLET, FILM COATED ORAL at 08:13

## 2024-11-03 RX ADMIN — INSULIN LISPRO 3 UNITS: 100 INJECTION, SOLUTION INTRAVENOUS; SUBCUTANEOUS at 20:23

## 2024-11-03 RX ADMIN — LEVETIRACETAM 500 MG: 500 TABLET, FILM COATED ORAL at 20:22

## 2024-11-03 RX ADMIN — LACTULOSE 30 G: 10 SOLUTION ORAL at 08:13

## 2024-11-03 RX ADMIN — BUSPIRONE HYDROCHLORIDE 7.5 MG: 15 TABLET ORAL at 20:22

## 2024-11-03 RX ADMIN — INSULIN LISPRO 2 UNITS: 100 INJECTION, SOLUTION INTRAVENOUS; SUBCUTANEOUS at 08:12

## 2024-11-03 RX ADMIN — MAGNESIUM SULFATE HEPTAHYDRATE 2 G: 40 INJECTION, SOLUTION INTRAVENOUS at 08:12

## 2024-11-03 RX ADMIN — POTASSIUM CHLORIDE 40 MEQ: 750 CAPSULE, EXTENDED RELEASE ORAL at 08:13

## 2024-11-03 RX ADMIN — SERTRALINE HYDROCHLORIDE 100 MG: 100 TABLET ORAL at 20:23

## 2024-11-03 RX ADMIN — FUROSEMIDE 60 MG: 10 INJECTION, SOLUTION INTRAMUSCULAR; INTRAVENOUS at 17:25

## 2024-11-03 RX ADMIN — CARVEDILOL 25 MG: 25 TABLET, FILM COATED ORAL at 17:25

## 2024-11-03 RX ADMIN — CARVEDILOL 25 MG: 25 TABLET, FILM COATED ORAL at 08:13

## 2024-11-03 RX ADMIN — FUROSEMIDE 60 MG: 10 INJECTION, SOLUTION INTRAMUSCULAR; INTRAVENOUS at 08:13

## 2024-11-03 RX ADMIN — POTASSIUM CHLORIDE 40 MEQ: 750 CAPSULE, EXTENDED RELEASE ORAL at 11:50

## 2024-11-03 RX ADMIN — SUCRALFATE 1 G: 1 TABLET ORAL at 08:13

## 2024-11-03 RX ADMIN — OXYCODONE 10 MG: 5 TABLET ORAL at 20:22

## 2024-11-03 RX ADMIN — PANTOPRAZOLE SODIUM 40 MG: 40 TABLET, DELAYED RELEASE ORAL at 08:14

## 2024-11-03 RX ADMIN — SIMETHICONE 80 MG: 80 TABLET, CHEWABLE ORAL at 08:13

## 2024-11-03 RX ADMIN — SUCRALFATE 1 G: 1 TABLET ORAL at 11:50

## 2024-11-03 RX ADMIN — RIFAXIMIN 550 MG: 550 TABLET ORAL at 08:13

## 2024-11-03 RX ADMIN — Medication 10 ML: at 08:14

## 2024-11-03 RX ADMIN — BUSPIRONE HYDROCHLORIDE 7.5 MG: 15 TABLET ORAL at 17:25

## 2024-11-03 RX ADMIN — SIMETHICONE 80 MG: 80 TABLET, CHEWABLE ORAL at 17:25

## 2024-11-03 RX ADMIN — SIMETHICONE 80 MG: 80 TABLET, CHEWABLE ORAL at 11:50

## 2024-11-03 RX ADMIN — BUSPIRONE HYDROCHLORIDE 7.5 MG: 15 TABLET ORAL at 08:13

## 2024-11-03 RX ADMIN — INSULIN LISPRO 2 UNITS: 100 INJECTION, SOLUTION INTRAVENOUS; SUBCUTANEOUS at 11:50

## 2024-11-03 RX ADMIN — LACTULOSE 30 G: 10 SOLUTION ORAL at 17:25

## 2024-11-03 RX ADMIN — METOLAZONE 5 MG: 5 TABLET ORAL at 08:14

## 2024-11-03 RX ADMIN — Medication 2000 UNITS: at 08:13

## 2024-11-03 RX ADMIN — OXYCODONE 10 MG: 5 TABLET ORAL at 03:44

## 2024-11-03 RX ADMIN — SUCRALFATE 1 G: 1 TABLET ORAL at 20:23

## 2024-11-03 RX ADMIN — LACTULOSE 30 G: 10 SOLUTION ORAL at 20:23

## 2024-11-03 RX ADMIN — OXYCODONE 10 MG: 5 TABLET ORAL at 11:49

## 2024-11-03 RX ADMIN — RIFAXIMIN 550 MG: 550 TABLET ORAL at 20:22

## 2024-11-03 RX ADMIN — INSULIN LISPRO 3 UNITS: 100 INJECTION, SOLUTION INTRAVENOUS; SUBCUTANEOUS at 17:25

## 2024-11-03 RX ADMIN — LACTULOSE 30 G: 10 SOLUTION ORAL at 11:49

## 2024-11-03 RX ADMIN — SUCRALFATE 1 G: 1 TABLET ORAL at 17:25

## 2024-11-03 NOTE — PROGRESS NOTES
[facility]   Hospitalist Progress Note  Date: 11/3/2024  Patient Name: Nic Nicolas  : 1965  MRN: [id number]  Date of admission: 10/29/2024  Room/Bed: [facility]/      Subjective   Subjective     Chief Complaint:   Abdominal distention    Summary:  Nic Nicolas is a 59 y.o. male with medical history of ROMO cirrhosis with history of hepatic encephalopathy and decompensated cirrhosis, type 2 diabetes, obstructive sleep apnea, TBI, and COPD who is admitted from GI clinic due to decompensated cirrhosis     Patient was seen in gastroenterology clinic on 10/29/2024.  Noted he was up almost 30 pounds with severe lower extremity swelling, abdominal distention, orthopnea, and PND.  There is long conversation with the GI doctor about goals of care and was decided the patient would not pursue CPR if something were to happen.  However, the patient is so far behind on his volume status he needed to be admitted to the hospital for IV Lasix and fluid management.  Patient will need a paracentesis.  As result, patient was admitted to hospital to manage his volume status from decompensated cirrhosis.    Interval Followup:   Continue to push current increased diuretic regimen.  Still not getting accurate I's and O's.  However patient's daily weights indicate good diuresis.  Replacing potassium and magnesium today.    Objective   Objective     Vitals:   Temp:  [97.3 °F (36.3 °C)-98 °F (36.7 °C)] 97.8 °F (36.6 °C)  Heart Rate:  [62-72] 68  Resp:  [18] 18  BP: (116-148)/(62-72) 148/66    Physical Exam               Constitutional: Older than stated age, resting in bed comfortably              Eyes: Pupils equal, sclerae anicteric, no conjunctival injection              HENT: NCAT, mucous membranes moist              Neck: Supple, no thyromegaly, no lymphadenopathy, trachea midline              Respiratory: Clear to auscultation bilaterally, nonlabored respirations               Cardiovascular: RRR, no murmurs, rubs, or  gallops, palpable pedal pulses bilaterally              Gastrointestinal: Distended, no tenderness no rebound no guarding              Musculoskeletal: 3+ pitting edema, no clubbing or cyanosis to extremities              Psychiatric: Appropriate affect, cooperative              Neurologic: Oriented x 3, strength symmetric in all extremities, Cranial Nerves grossly intact to confrontation, speech clear.  No flapping asterixis, good concentration.  Able to recite days of the week backwards              Skin: No rashes     Result Review    Result Review:  I have personally reviewed these results:  [x]  Laboratory      Lab 11/03/24  0524 11/02/24  0443 11/01/24  0508 10/31/24  0512 10/30/24  0940 10/29/24  1642   WBC 3.83 3.82 3.10* 2.80* 2.62* 3.75   HEMOGLOBIN 8.0* 8.3* 7.8* 7.7* 6.9* 7.3*   HEMATOCRIT 26.0* 27.3* 26.5* 25.4* 22.8* 24.4*   PLATELETS 64* 59* 51* 44* 47* 62*   NEUTROS ABS  --   --   --  1.85 1.58* 2.60   IMMATURE GRANS (ABS)  --   --   --  0.01 0.00 0.01   LYMPHS ABS  --   --   --  0.50* 0.55* 0.49*   MONOS ABS  --   --   --  0.31 0.36 0.49   EOS ABS  --   --   --  0.10 0.11 0.13   MCV 87.2 88.3 91.1 90.4 91.6 91.7   PROTIME  --   --   --  24.3*  --  24.0*   APTT  --   --   --   --  40.1*  --          Lab 11/03/24 0524 11/02/24 0443 11/01/24 0508 10/31/24  0512   SODIUM 140 140 141 139   POTASSIUM 3.3* 3.6 3.5 3.5   CHLORIDE 104 107 108* 108*   CO2 25.1 24.3 20.8* 20.2*   ANION GAP 10.9 8.7 12.2 10.8   BUN 22* 23* 23* 28*   CREATININE 1.21 1.24 0.98 1.11   EGFR 69.4 67.4 89.4 77.0   GLUCOSE 184* 161* 141* 224*   CALCIUM 8.2* 8.3* 8.2* 8.1*   MAGNESIUM 1.7 1.7 2.5 1.5*   PHOSPHORUS  --   --  2.9 2.9         Lab 11/03/24  0524 11/02/24  0443 11/01/24  0508   TOTAL PROTEIN 5.4* 5.6* 5.3*   ALBUMIN 3.1* 3.2* 2.8*   GLOBULIN 2.3 2.4 2.5   ALT (SGPT) 28 27 23   AST (SGOT) 56* 57* 43*   BILIRUBIN 1.1 1.1 1.3*   ALK PHOS 151* 157* 139*         Lab 10/31/24  0512 10/29/24  1642   PROTIME 24.3* 24.0*   INR  2.14* 2.11*             Lab 10/29/24  1642   ABO TYPING A   RH TYPING Positive   ANTIBODY SCREEN Negative         Brief Urine Lab Results  (Last result in the past 365 days)        Color   Clarity   Blood   Leuk Est   Nitrite   Protein   CREAT   Urine HCG        09/29/24 0419 Yellow   Clear   Negative   Trace   Negative   Negative                 [x]  Microbiology   Microbiology Results (last 10 days)       ** No results found for the last 240 hours. **          [x]  Radiology  US Paracentesis    Result Date: 10/31/2024  Impression: Scant amount of ascitic fluid amenable to drainage Report dictated by: Keisha Gomez  I have personally reviewed this case and agree with the findings above: Electronically Signed: Perry Herndon MD  10/31/2024 9:32 AM EDT  Workstation ID: [id number]   []  EKG/Telemetry   []  Cardiology/Vascular   []  Pathology  []  Old records  []  Other:    Assessment & Plan   Assessment / Plan     Assessment:  Decompensated cirrhosis complicated by coagulopathy, thrombocytopenia  Acute on chronic anemia  Thrombocytopenia  History of hepatic encephalopathy  Type 2 diabetes  Hypokalemia  TBI  Seizure disorder     Plan:  Patient remains admitted to hospital for further care and management  Continue on scheduled IV diuresis, continue to monitor weights daily  Remains on increased dose of IV diuretics, providing metolazone today  Continue to monitor strict I's and O's, daily weights, daily weights indicate good diuresis  Supplementing potassium and magnesium today  Obtained abdominal ultrasound, however not a large enough fluid collection for paracentesis  Hemoglobin has remained stable, continue to monitor daily  Continue to monitor patient's thrombocytopenia, chronic issue for patient  Other home medications reviewed and resumed as indicated  Discussed with gastroenterologist, will likely discharge back to facility soon, which to take advantage of inpatient admission and IV diuresis.      Discussed with RN.       VTE Prophylaxis:  Mechanical VTE prophylaxis orders are present.        CODE STATUS:   Level Of Support Discussed With: Patient  Code Status (Patient has no pulse and is not breathing): No CPR (Do Not Attempt to Resuscitate)  Medical Interventions (Patient has pulse or is breathing): Full Support      Electronically signed by Odell Soliz MD, 11/3/2024, 14:26 EST.

## 2024-11-04 VITALS
WEIGHT: 289.24 LBS | RESPIRATION RATE: 18 BRPM | OXYGEN SATURATION: 96 % | SYSTOLIC BLOOD PRESSURE: 112 MMHG | DIASTOLIC BLOOD PRESSURE: 50 MMHG | TEMPERATURE: 98.5 F | HEART RATE: 75 BPM | BODY MASS INDEX: 43.84 KG/M2 | HEIGHT: 68 IN

## 2024-11-04 LAB
ALBUMIN SERPL-MCNC: 3.2 G/DL (ref 3.5–5.2)
ALBUMIN/GLOB SERPL: 1.3 G/DL
ALP SERPL-CCNC: 154 U/L (ref 39–117)
ALT SERPL W P-5'-P-CCNC: 28 U/L (ref 1–41)
ANION GAP SERPL CALCULATED.3IONS-SCNC: 10.6 MMOL/L (ref 5–15)
AST SERPL-CCNC: 55 U/L (ref 1–40)
BILIRUB SERPL-MCNC: 1.1 MG/DL (ref 0–1.2)
BUN SERPL-MCNC: 20 MG/DL (ref 6–20)
BUN/CREAT SERPL: 15.7 (ref 7–25)
CALCIUM SPEC-SCNC: 8.6 MG/DL (ref 8.6–10.5)
CHLORIDE SERPL-SCNC: 105 MMOL/L (ref 98–107)
CO2 SERPL-SCNC: 25.4 MMOL/L (ref 22–29)
CREAT SERPL-MCNC: 1.27 MG/DL (ref 0.76–1.27)
DEPRECATED RDW RBC AUTO: 67.4 FL (ref 37–54)
EGFRCR SERPLBLD CKD-EPI 2021: 65.5 ML/MIN/1.73
ERYTHROCYTE [DISTWIDTH] IN BLOOD BY AUTOMATED COUNT: 20.9 % (ref 12.3–15.4)
GLOBULIN UR ELPH-MCNC: 2.5 GM/DL
GLUCOSE BLDC GLUCOMTR-MCNC: 147 MG/DL (ref 70–99)
GLUCOSE BLDC GLUCOMTR-MCNC: 180 MG/DL (ref 70–99)
GLUCOSE SERPL-MCNC: 162 MG/DL (ref 65–99)
HCT VFR BLD AUTO: 27.3 % (ref 37.5–51)
HGB BLD-MCNC: 8.5 G/DL (ref 13–17.7)
MAGNESIUM SERPL-MCNC: 1.8 MG/DL (ref 1.6–2.6)
MCH RBC QN AUTO: 27.5 PG (ref 26.6–33)
MCHC RBC AUTO-ENTMCNC: 31.1 G/DL (ref 31.5–35.7)
MCV RBC AUTO: 88.3 FL (ref 79–97)
PLATELET # BLD AUTO: 62 10*3/MM3 (ref 140–450)
PMV BLD AUTO: 11.2 FL (ref 6–12)
POTASSIUM SERPL-SCNC: 3.6 MMOL/L (ref 3.5–5.2)
PROT SERPL-MCNC: 5.7 G/DL (ref 6–8.5)
RBC # BLD AUTO: 3.09 10*6/MM3 (ref 4.14–5.8)
SODIUM SERPL-SCNC: 141 MMOL/L (ref 136–145)
WBC NRBC COR # BLD AUTO: 3.76 10*3/MM3 (ref 3.4–10.8)

## 2024-11-04 PROCEDURE — 63710000001 INSULIN LISPRO (HUMAN) PER 5 UNITS: Performed by: INTERNAL MEDICINE

## 2024-11-04 PROCEDURE — 85027 COMPLETE CBC AUTOMATED: CPT | Performed by: INTERNAL MEDICINE

## 2024-11-04 PROCEDURE — 25010000002 FUROSEMIDE PER 20 MG: Performed by: INTERNAL MEDICINE

## 2024-11-04 PROCEDURE — 99239 HOSP IP/OBS DSCHRG MGMT >30: CPT | Performed by: INTERNAL MEDICINE

## 2024-11-04 PROCEDURE — 83735 ASSAY OF MAGNESIUM: CPT | Performed by: INTERNAL MEDICINE

## 2024-11-04 PROCEDURE — 80053 COMPREHEN METABOLIC PANEL: CPT | Performed by: INTERNAL MEDICINE

## 2024-11-04 PROCEDURE — 82948 REAGENT STRIP/BLOOD GLUCOSE: CPT | Performed by: INTERNAL MEDICINE

## 2024-11-04 PROCEDURE — 97110 THERAPEUTIC EXERCISES: CPT

## 2024-11-04 RX ORDER — SPIRONOLACTONE 50 MG/1
100 TABLET, FILM COATED ORAL DAILY
Qty: 60 TABLET | Refills: 0 | Status: SHIPPED | OUTPATIENT
Start: 2024-11-04 | End: 2024-12-04

## 2024-11-04 RX ORDER — METOLAZONE 5 MG/1
5 TABLET ORAL ONCE
Status: COMPLETED | OUTPATIENT
Start: 2024-11-04 | End: 2024-11-04

## 2024-11-04 RX ORDER — FUROSEMIDE 40 MG/1
80 TABLET ORAL DAILY
Qty: 60 TABLET | Refills: 0 | Status: SHIPPED | OUTPATIENT
Start: 2024-11-04 | End: 2024-12-04

## 2024-11-04 RX ADMIN — Medication 2000 UNITS: at 08:25

## 2024-11-04 RX ADMIN — OXYCODONE 10 MG: 5 TABLET ORAL at 04:22

## 2024-11-04 RX ADMIN — PANTOPRAZOLE SODIUM 40 MG: 40 TABLET, DELAYED RELEASE ORAL at 08:25

## 2024-11-04 RX ADMIN — INSULIN LISPRO 2 UNITS: 100 INJECTION, SOLUTION INTRAVENOUS; SUBCUTANEOUS at 12:30

## 2024-11-04 RX ADMIN — SIMETHICONE 80 MG: 80 TABLET, CHEWABLE ORAL at 12:30

## 2024-11-04 RX ADMIN — SIMETHICONE 80 MG: 80 TABLET, CHEWABLE ORAL at 08:25

## 2024-11-04 RX ADMIN — RIFAXIMIN 550 MG: 550 TABLET ORAL at 08:25

## 2024-11-04 RX ADMIN — METOLAZONE 5 MG: 5 TABLET ORAL at 08:45

## 2024-11-04 RX ADMIN — LEVETIRACETAM 500 MG: 500 TABLET, FILM COATED ORAL at 08:25

## 2024-11-04 RX ADMIN — BUSPIRONE HYDROCHLORIDE 7.5 MG: 15 TABLET ORAL at 08:25

## 2024-11-04 RX ADMIN — LACTULOSE 30 G: 10 SOLUTION ORAL at 08:24

## 2024-11-04 RX ADMIN — SUCRALFATE 1 G: 1 TABLET ORAL at 12:30

## 2024-11-04 RX ADMIN — OXYCODONE 10 MG: 5 TABLET ORAL at 12:30

## 2024-11-04 RX ADMIN — FUROSEMIDE 60 MG: 10 INJECTION, SOLUTION INTRAMUSCULAR; INTRAVENOUS at 08:24

## 2024-11-04 RX ADMIN — LACTULOSE 30 G: 10 SOLUTION ORAL at 12:30

## 2024-11-04 RX ADMIN — CARVEDILOL 25 MG: 25 TABLET, FILM COATED ORAL at 08:25

## 2024-11-04 RX ADMIN — Medication 10 ML: at 08:45

## 2024-11-04 RX ADMIN — SUCRALFATE 1 G: 1 TABLET ORAL at 08:45

## 2024-11-04 NOTE — PLAN OF CARE
Goal Outcome Evaluation:   Patient is aox4, cooperative with care. C/o generalized chronic pain, given pain medication as ordered. Medication proven effective per patient. Notified patient of discharge status and patient is excited to be going back to facility. Patient eating lunch, call light in reach.

## 2024-11-04 NOTE — DISCHARGE SUMMARY
[facility]         HOSPITALIST  DISCHARGE SUMMARY    Patient Name: Nic Nicolas  : 1965  MRN: [id number]    Date of Admission: 10/29/2024  Date of Discharge:  2024  Primary Care Physician: Sheldon Carcamo MD    Consults:  Gastroenterology     Active and Resolved Hospital Problems:  Decompensated cirrhosis complicated by coagulopathy, thrombocytopenia  Fluid overload   Acute on chronic anemia  Thrombocytopenia  History of hepatic encephalopathy  Type 2 diabetes  Hypokalemia  TBI  Seizure disorder     Hospital Course     Hospital Course:  Nic Nicolas is a 59 y.o. male with medical history of ROMO cirrhosis with history of hepatic encephalopathy and decompensated cirrhosis, type 2 diabetes, obstructive sleep apnea, TBI, and COPD who is admitted from GI clinic due to decompensated cirrhosis     Patient was seen in gastroenterology clinic on 10/29/2024.  Noted he was up almost 30 pounds with severe lower extremity swelling, abdominal distention, orthopnea, and PND.  There is long conversation with the GI doctor about goals of care and was decided the patient would not pursue CPR if something were to happen.  However, the patient is so far behind on his volume status he needed to be admitted to the hospital for IV Lasix and fluid management. Patient underwent abdominal ultrasound without enough fluid for paracentesis. Patient remained on aggressive IV diuresis with good urinary output and improvement in abdominal distension. Patient is discharging on increased diuretics and instructions for daily weights. Patient may require further titration of his diuretics as an outpatient based on weights. Repeat labs ordered for Friday following DC. Patient in stable condition for discharge back to long term facility. Patient provided concerning signs and symptoms prompting medical attention.     DISCHARGE Follow Up Recommendations for labs and diagnostics:   Follow up with PCP as soon as  possible  Follow up with GI in 3 weeks  Labs ordered for one week to monitor electrolytes and renal function    Patient needs to have daily weight preformed       Day of Discharge     Vital Signs:  Temp:  [98 °F (36.7 °C)-98.6 °F (37 °C)] 98.5 °F (36.9 °C)  Heart Rate:  [67-96] 75  Resp:  [18] 18  BP: (112-140)/(50-67) 112/50  Physical Exam:               Constitutional: Older than stated age, resting in bed comfortably              Eyes: Pupils equal, sclerae anicteric, no conjunctival injection              HENT: NCAT, mucous membranes moist              Neck: Supple, no thyromegaly, no lymphadenopathy, trachea midline              Respiratory: Clear to auscultation bilaterally, nonlabored respirations               Cardiovascular: RRR, no murmurs, rubs, or gallops, palpable pedal pulses bilaterally              Gastrointestinal: Distended, no tenderness no rebound no guarding              Musculoskeletal: 2+ pitting edema, no clubbing or cyanosis to extremities              Psychiatric: Appropriate affect, cooperative              Neurologic: Oriented x 3, strength symmetric in all extremities, Cranial Nerves grossly intact to confrontation, speech clear.  No flapping asterixis, good concentration.  Able to recite days of the week backwards              Skin: No rashes     Discharge Details        Discharge Medications        New Medications        Instructions Start Date   spironolactone 50 MG tablet  Commonly known as: Aldactone   100 mg, Oral, Daily             Changes to Medications        Instructions Start Date   furosemide 40 MG tablet  Commonly known as: LASIX  What changed:   how much to take  how to take this  when to take this   80 mg, Oral, Daily      lactulose 10 GM/15ML solution  Commonly known as: CHRONULAC  What changed: how much to take   20 g, Oral, 4 Times Daily             Continue These Medications        Instructions Start Date   busPIRone 7.5 MG tablet  Commonly known as: BUSPAR   7.5 mg,  Oral, 3 Times Daily      carvedilol 25 MG tablet  Commonly known as: COREG   25 mg, 2 Times Daily With Meals      Diclofenac Sodium 1 % gel gel  Commonly known as: VOLTAREN   4 g, 4 Times Daily      famotidine 20 MG tablet  Commonly known as: PEPCID   20 mg, Daily      Flovent  MCG/ACT inhaler  Generic drug: fluticasone   1 puff, Inhalation, 2 Times Daily - RT      fluticasone 50 MCG/ACT nasal spray  Commonly known as: FLONASE   1 spray, Daily      HumaLOG KwikPen 100 UNIT/ML solution pen-injector  Generic drug: Insulin Lispro (1 Unit Dial)   15 Units, 3 Times Daily Before Meals      hydrocortisone 1 % cream   1 Application, 2 Times Daily PRN      hydrOXYzine 25 MG tablet  Commonly known as: ATARAX   Take 1 tablet by mouth Every 8 (Eight) Hours As Needed for Itching.      Levemir FlexPen 100 UNIT/ML injection  Generic drug: insulin detemir   10 Units, Subcutaneous, Daily      levETIRAcetam 500 MG tablet  Commonly known as: Keppra   500 mg, Oral, 2 Times Daily      LidozenPatch 4-1 % patch  Generic drug: Lidocaine-Menthol   1 patch, Daily      magic barrier cream   1 Application, 2 Times Daily      magnesium oxide 400 MG tablet  Commonly known as: MAG-OX   400 mg, Oral, Daily      ondansetron 4 MG tablet  Commonly known as: Zofran   4 mg, Oral, Every 8 Hours PRN      oxyCODONE 10 MG tablet  Commonly known as: ROXICODONE   Take 1 tablet by mouth Every 8 (Eight) Hours As Needed for Moderate Pain or Severe Pain.      pantoprazole 40 MG EC tablet  Commonly known as: PROTONIX   40 mg, Daily      rOPINIRole 1 MG tablet  Commonly known as: REQUIP   1 mg, Oral, Nightly, take 1 hour before bedtime      saccharomyces boulardii 250 MG capsule  Commonly known as: FLORASTOR   250 mg, 2 Times Daily      sertraline 100 MG tablet  Commonly known as: ZOLOFT   100 mg, Oral, Nightly      simethicone 80 MG chewable tablet  Commonly known as: MYLICON   80 mg, 3 Times Daily Before Meals      sucralfate 1 g tablet  Commonly known  as: CARAFATE   1 g, 4 Times Daily      traZODone 50 MG tablet  Commonly known as: DESYREL   50 mg, Nightly      triamcinolone 0.1 % cream  Commonly known as: KENALOG   Apply 1 Application topically to the appropriate area as directed 2 (Two) Times a Day.      Vitamin D3 50 MCG (2000 UT) capsule   2,000 Units, Daily      Xifaxan 550 MG tablet  Generic drug: riFAXIMin   Take 1 tablet by mouth Every 12 (Twelve) Hours.      Zinc 50 MG tablet   1 tablet, Daily             Stop These Medications      doxycycline 100 MG capsule  Commonly known as: VIBRAMYCIN              No Known Allergies    Discharge Disposition:  Long Term Care (DC - External)    Diet:  Hospital:  Diet Order   Procedures    Diet: Cardiac, Diabetic, Fluid Restriction (240 mL/tray); Healthy Heart (2-3 Na+); Consistent Carbohydrate; 2000 mL/day; Texture: Mechanical Ground (NDD 2); Fluid Consistency: Thin (IDDSI 0)       Discharge Activity:   Activity Instructions       Activity as Tolerated      Measure Weight      Patient will need daily standing weights taken. For consistent weight gain, either PCP or GI needs to be called with diuretic adjustment.            CODE STATUS:  Code Status and Medical Interventions: No CPR (Do Not Attempt to Resuscitate); Full Support   Ordered at: 10/29/24 0383     Level Of Support Discussed With:    Patient     Code Status (Patient has no pulse and is not breathing):    No CPR (Do Not Attempt to Resuscitate)     Medical Interventions (Patient has pulse or is breathing):    Full Support         Future Appointments   Date Time Provider Department Center   12/10/2024  9:30 AM NURSE/MA [facility] [place] [facility] [address] [facility]   12/13/2024 11:30 AM Juan Jose Sanchez MD [facility] [place] [facility] [address] [facility]       Additional Instructions for the Follow-ups that You Need to Schedule       Discharge Follow-up with PCP   As directed       Currently Documented PCP:    Sheldon Carcamo MD    PCP Phone Number:    [phone]     Follow Up Details: In less  than one week        Discharge Follow-up with Specified Provider: Dr Osbaldo MILIAN; 3 Weeks   As directed      To: Dr Osbaldo MILIAN   Follow Up: 3 Weeks        CBC & Differential    Nov 08, 2024 (Approximate)      Manual Differential: No   Release to patient: Routine Release        Comprehensive Metabolic Panel    Nov 08, 2024 (Approximate)      Release to patient: Routine Release        Magnesium    Nov 08, 2024 (Approximate)      Release to patient: Routine Release                Pertinent  and/or Most Recent Results     PROCEDURES:   None     LAB RESULTS:      Lab 11/04/24  0517 11/03/24 0524 11/02/24  0443 11/01/24  0508 10/31/24  0512 10/30/24  0940 10/29/24  1642   WBC 3.76 3.83 3.82 3.10* 2.80* 2.62* 3.75   HEMOGLOBIN 8.5* 8.0* 8.3* 7.8* 7.7* 6.9* 7.3*   HEMATOCRIT 27.3* 26.0* 27.3* 26.5* 25.4* 22.8* 24.4*   PLATELETS 62* 64* 59* 51* 44* 47* 62*   NEUTROS ABS  --   --   --   --  1.85 1.58* 2.60   IMMATURE GRANS (ABS)  --   --   --   --  0.01 0.00 0.01   LYMPHS ABS  --   --   --   --  0.50* 0.55* 0.49*   MONOS ABS  --   --   --   --  0.31 0.36 0.49   EOS ABS  --   --   --   --  0.10 0.11 0.13   MCV 88.3 87.2 88.3 91.1 90.4 91.6 91.7   PROTIME  --   --   --   --  24.3*  --  24.0*   APTT  --   --   --   --   --  40.1*  --          Lab 11/04/24  0517 11/03/24 0524 11/02/24 0443 11/01/24  0508 10/31/24  0512   SODIUM 141 140 140 141 139   POTASSIUM 3.6 3.3* 3.6 3.5 3.5   CHLORIDE 105 104 107 108* 108*   CO2 25.4 25.1 24.3 20.8* 20.2*   ANION GAP 10.6 10.9 8.7 12.2 10.8   BUN 20 22* 23* 23* 28*   CREATININE 1.27 1.21 1.24 0.98 1.11   EGFR 65.5 69.4 67.4 89.4 77.0   GLUCOSE 162* 184* 161* 141* 224*   CALCIUM 8.6 8.2* 8.3* 8.2* 8.1*   MAGNESIUM 1.8 1.7 1.7 2.5 1.5*   PHOSPHORUS  --   --   --  2.9 2.9         Lab 11/04/24  0517 11/03/24  0524 11/02/24  0443 11/01/24  0508 10/31/24  0512   TOTAL PROTEIN 5.7* 5.4* 5.6* 5.3* 5.1*   ALBUMIN 3.2* 3.1* 3.2* 2.8* 2.7*   GLOBULIN 2.5 2.3 2.4 2.5 2.4   ALT (SGPT) 28 28 27 23 23    AST (SGOT) 55* 56* 57* 43* 42*   BILIRUBIN 1.1 1.1 1.1 1.3* 1.2   ALK PHOS 154* 151* 157* 139* 138*         Lab 10/31/24  0512 10/29/24  1642   PROTIME 24.3* 24.0*   INR 2.14* 2.11*             Lab 10/29/24  1642   ABO TYPING A   RH TYPING Positive   ANTIBODY SCREEN Negative         Brief Urine Lab Results  (Last result in the past 365 days)        Color   Clarity   Blood   Leuk Est   Nitrite   Protein   CREAT   Urine HCG        09/29/24 0419 Yellow   Clear   Negative   Trace   Negative   Negative                 Microbiology Results (last 10 days)       ** No results found for the last 240 hours. **            US Paracentesis    Result Date: 10/31/2024  Impression: Impression: Scant amount of ascitic fluid amenable to drainage Report dictated by: Keisha Gomez  I have personally reviewed this case and agree with the findings above: Electronically Signed: Perry Herndon MD  10/31/2024 9:32 AM EDT  Workstation ID: [id number]     Results for orders placed during the hospital encounter of 02/24/22    Duplex Carotid Ultrasound CAR    Interpretation Summary  · No significant stenosis is present in carotid bifurcations bilaterally.  · There are right mid internal carotid artery velocity elevations that would meet criteria for mild to moderate stenosis, however, this appears to be related to tortuosity in this region.  · No significant plaque in the bifurcations bilaterally.  · Vertebral flow is antegrade bilaterally.      Results for orders placed during the hospital encounter of 02/24/22    Duplex Carotid Ultrasound CAR    Interpretation Summary  · No significant stenosis is present in carotid bifurcations bilaterally.  · There are right mid internal carotid artery velocity elevations that would meet criteria for mild to moderate stenosis, however, this appears to be related to tortuosity in this region.  · No significant plaque in the bifurcations bilaterally.  · Vertebral flow is antegrade bilaterally.      Results for  orders placed during the hospital encounter of 05/13/24    Adult Transthoracic Echo Complete W/ Cont if Necessary Per Protocol    Interpretation Summary    Left ventricular ejection fraction appears to be 66 - 70%.    Left ventricular wall thickness is consistent with mild concentric hypertrophy.    Left ventricular diastolic function is consistent with (grade I) impaired relaxation.    The left atrial cavity is mildly dilated.    Estimated right ventricular systolic pressure from tricuspid regurgitation is normal (<35 mmHg).      Labs Pending at Discharge:        Time spent on Discharge including face to face service:  39 minutes    Electronically signed by Odell Soliz MD, 11/04/24, 12:08 PM EST.
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
## 2024-11-05 LAB
QT INTERVAL: 455 MS
QTC INTERVAL: 452 MS

## 2024-11-25 ENCOUNTER — OFFICE VISIT (OUTPATIENT)
Dept: GASTROENTEROLOGY | Facility: CLINIC | Age: 59
End: 2024-11-25
Payer: MEDICAID

## 2024-11-25 ENCOUNTER — APPOINTMENT (OUTPATIENT)
Dept: CT IMAGING | Facility: HOSPITAL | Age: 59
DRG: 441 | End: 2024-11-25
Payer: MEDICAID

## 2024-11-25 ENCOUNTER — HOSPITAL ENCOUNTER (INPATIENT)
Facility: HOSPITAL | Age: 59
LOS: 14 days | Discharge: NURSING, REHAB OR LONG-TERM CARE | DRG: 441 | End: 2024-12-09
Attending: EMERGENCY MEDICINE | Admitting: FAMILY MEDICINE
Payer: MEDICAID

## 2024-11-25 VITALS
SYSTOLIC BLOOD PRESSURE: 139 MMHG | BODY MASS INDEX: 42.74 KG/M2 | WEIGHT: 282 LBS | DIASTOLIC BLOOD PRESSURE: 63 MMHG | OXYGEN SATURATION: 100 % | HEIGHT: 68 IN | HEART RATE: 66 BPM

## 2024-11-25 DIAGNOSIS — K92.2 GASTROINTESTINAL HEMORRHAGE, UNSPECIFIED GASTROINTESTINAL HEMORRHAGE TYPE: ICD-10-CM

## 2024-11-25 DIAGNOSIS — R26.2 DIFFICULTY WALKING: ICD-10-CM

## 2024-11-25 DIAGNOSIS — K21.9 GASTROESOPHAGEAL REFLUX DISEASE WITHOUT ESOPHAGITIS: ICD-10-CM

## 2024-11-25 DIAGNOSIS — K75.81 LIVER CIRRHOSIS SECONDARY TO NASH (NONALCOHOLIC STEATOHEPATITIS): ICD-10-CM

## 2024-11-25 DIAGNOSIS — D69.6 THROMBOCYTOPENIA: ICD-10-CM

## 2024-11-25 DIAGNOSIS — G93.40 ENCEPHALOPATHY, UNSPECIFIED TYPE: Primary | ICD-10-CM

## 2024-11-25 DIAGNOSIS — K74.60 LIVER CIRRHOSIS SECONDARY TO NASH (NONALCOHOLIC STEATOHEPATITIS): ICD-10-CM

## 2024-11-25 DIAGNOSIS — E72.20 HYPERAMMONEMIA: Primary | ICD-10-CM

## 2024-11-25 DIAGNOSIS — Z87.11 HISTORY OF BLEEDING ULCERS: ICD-10-CM

## 2024-11-25 DIAGNOSIS — D64.9 ANEMIA, UNSPECIFIED TYPE: ICD-10-CM

## 2024-11-25 DIAGNOSIS — K76.6 PORTAL HYPERTENSION: ICD-10-CM

## 2024-11-25 DIAGNOSIS — K92.0 HEMATEMESIS WITH NAUSEA: ICD-10-CM

## 2024-11-25 DIAGNOSIS — K27.4 GASTROINTESTINAL HEMORRHAGE ASSOCIATED WITH PEPTIC ULCER: ICD-10-CM

## 2024-11-25 DIAGNOSIS — I50.21 ACUTE SYSTOLIC CONGESTIVE HEART FAILURE: ICD-10-CM

## 2024-11-25 DIAGNOSIS — K76.82 HEPATIC ENCEPHALOPATHY: ICD-10-CM

## 2024-11-25 DIAGNOSIS — D50.0 IRON DEFICIENCY ANEMIA DUE TO CHRONIC BLOOD LOSS: ICD-10-CM

## 2024-11-25 LAB
ALBUMIN SERPL-MCNC: 3.1 G/DL (ref 3.5–5.2)
ALBUMIN/GLOB SERPL: 1 G/DL
ALP SERPL-CCNC: 142 U/L (ref 39–117)
ALT SERPL W P-5'-P-CCNC: 22 U/L (ref 1–41)
AMMONIA BLD-SCNC: 69 UMOL/L (ref 16–60)
ANION GAP SERPL CALCULATED.3IONS-SCNC: 9.4 MMOL/L (ref 5–15)
AST SERPL-CCNC: 45 U/L (ref 1–40)
BASOPHILS # BLD AUTO: 0.04 10*3/MM3 (ref 0–0.2)
BASOPHILS NFR BLD AUTO: 0.8 % (ref 0–1.5)
BILIRUB SERPL-MCNC: 1.6 MG/DL (ref 0–1.2)
BILIRUB UR QL STRIP: NEGATIVE
BUN SERPL-MCNC: 27 MG/DL (ref 6–20)
BUN/CREAT SERPL: 20.1 (ref 7–25)
CALCIUM SPEC-SCNC: 8.6 MG/DL (ref 8.6–10.5)
CHLORIDE SERPL-SCNC: 102 MMOL/L (ref 98–107)
CLARITY UR: CLEAR
CO2 SERPL-SCNC: 26.6 MMOL/L (ref 22–29)
COLOR UR: YELLOW
CREAT SERPL-MCNC: 1.34 MG/DL (ref 0.76–1.27)
D-LACTATE SERPL-SCNC: 1.4 MMOL/L (ref 0.5–2)
DEPRECATED RDW RBC AUTO: 60.1 FL (ref 37–54)
EGFRCR SERPLBLD CKD-EPI 2021: 61.4 ML/MIN/1.73
EOSINOPHIL # BLD AUTO: 0.14 10*3/MM3 (ref 0–0.4)
EOSINOPHIL NFR BLD AUTO: 2.9 % (ref 0.3–6.2)
ERYTHROCYTE [DISTWIDTH] IN BLOOD BY AUTOMATED COUNT: 18.7 % (ref 12.3–15.4)
GLOBULIN UR ELPH-MCNC: 3.2 GM/DL
GLUCOSE SERPL-MCNC: 162 MG/DL (ref 65–99)
GLUCOSE UR STRIP-MCNC: NEGATIVE MG/DL
HCT VFR BLD AUTO: 24.8 % (ref 37.5–51)
HGB BLD-MCNC: 7.6 G/DL (ref 13–17.7)
HGB UR QL STRIP.AUTO: NEGATIVE
HOLD SPECIMEN: NORMAL
HOLD SPECIMEN: NORMAL
IMM GRANULOCYTES # BLD AUTO: 0.02 10*3/MM3 (ref 0–0.05)
IMM GRANULOCYTES NFR BLD AUTO: 0.4 % (ref 0–0.5)
KETONES UR QL STRIP: NEGATIVE
LEUKOCYTE ESTERASE UR QL STRIP.AUTO: NEGATIVE
LIPASE SERPL-CCNC: 37 U/L (ref 13–60)
LYMPHOCYTES # BLD AUTO: 0.9 10*3/MM3 (ref 0.7–3.1)
LYMPHOCYTES NFR BLD AUTO: 18.8 % (ref 19.6–45.3)
MCH RBC QN AUTO: 26.8 PG (ref 26.6–33)
MCHC RBC AUTO-ENTMCNC: 30.6 G/DL (ref 31.5–35.7)
MCV RBC AUTO: 87.3 FL (ref 79–97)
MONOCYTES # BLD AUTO: 0.46 10*3/MM3 (ref 0.1–0.9)
MONOCYTES NFR BLD AUTO: 9.6 % (ref 5–12)
NEUTROPHILS NFR BLD AUTO: 3.22 10*3/MM3 (ref 1.7–7)
NEUTROPHILS NFR BLD AUTO: 67.5 % (ref 42.7–76)
NITRITE UR QL STRIP: NEGATIVE
NRBC BLD AUTO-RTO: 0 /100 WBC (ref 0–0.2)
NT-PROBNP SERPL-MCNC: 328 PG/ML (ref 0–900)
PH UR STRIP.AUTO: 7.5 [PH] (ref 5–8)
PLATELET # BLD AUTO: 75 10*3/MM3 (ref 140–450)
PMV BLD AUTO: 12.6 FL (ref 6–12)
POTASSIUM SERPL-SCNC: 4.4 MMOL/L (ref 3.5–5.2)
PROT SERPL-MCNC: 6.3 G/DL (ref 6–8.5)
PROT UR QL STRIP: NEGATIVE
RBC # BLD AUTO: 2.84 10*6/MM3 (ref 4.14–5.8)
SODIUM SERPL-SCNC: 138 MMOL/L (ref 136–145)
SP GR UR STRIP: 1.01 (ref 1–1.03)
UROBILINOGEN UR QL STRIP: NORMAL
WBC NRBC COR # BLD AUTO: 4.78 10*3/MM3 (ref 3.4–10.8)
WHOLE BLOOD HOLD COAG: NORMAL
WHOLE BLOOD HOLD SPECIMEN: NORMAL

## 2024-11-25 PROCEDURE — 99214 OFFICE O/P EST MOD 30 MIN: CPT | Performed by: NURSE PRACTITIONER

## 2024-11-25 PROCEDURE — 81003 URINALYSIS AUTO W/O SCOPE: CPT

## 2024-11-25 PROCEDURE — 74177 CT ABD & PELVIS W/CONTRAST: CPT

## 2024-11-25 PROCEDURE — 25510000001 IOPAMIDOL PER 1 ML: Performed by: EMERGENCY MEDICINE

## 2024-11-25 PROCEDURE — 85025 COMPLETE CBC W/AUTO DIFF WBC: CPT | Performed by: EMERGENCY MEDICINE

## 2024-11-25 PROCEDURE — 3078F DIAST BP <80 MM HG: CPT | Performed by: NURSE PRACTITIONER

## 2024-11-25 PROCEDURE — 3075F SYST BP GE 130 - 139MM HG: CPT | Performed by: NURSE PRACTITIONER

## 2024-11-25 PROCEDURE — 82140 ASSAY OF AMMONIA: CPT | Performed by: EMERGENCY MEDICINE

## 2024-11-25 PROCEDURE — 83605 ASSAY OF LACTIC ACID: CPT | Performed by: EMERGENCY MEDICINE

## 2024-11-25 PROCEDURE — 99223 1ST HOSP IP/OBS HIGH 75: CPT | Performed by: FAMILY MEDICINE

## 2024-11-25 PROCEDURE — 25010000002 ONDANSETRON PER 1 MG: Performed by: EMERGENCY MEDICINE

## 2024-11-25 PROCEDURE — 1159F MED LIST DOCD IN RCRD: CPT | Performed by: NURSE PRACTITIONER

## 2024-11-25 PROCEDURE — 36415 COLL VENOUS BLD VENIPUNCTURE: CPT

## 2024-11-25 PROCEDURE — 80053 COMPREHEN METABOLIC PANEL: CPT | Performed by: EMERGENCY MEDICINE

## 2024-11-25 PROCEDURE — 85045 AUTOMATED RETICULOCYTE COUNT: CPT | Performed by: FAMILY MEDICINE

## 2024-11-25 PROCEDURE — 83880 ASSAY OF NATRIURETIC PEPTIDE: CPT | Performed by: EMERGENCY MEDICINE

## 2024-11-25 PROCEDURE — 83690 ASSAY OF LIPASE: CPT | Performed by: EMERGENCY MEDICINE

## 2024-11-25 PROCEDURE — 25010000002 FUROSEMIDE PER 20 MG: Performed by: EMERGENCY MEDICINE

## 2024-11-25 PROCEDURE — 1160F RVW MEDS BY RX/DR IN RCRD: CPT | Performed by: NURSE PRACTITIONER

## 2024-11-25 PROCEDURE — 99291 CRITICAL CARE FIRST HOUR: CPT

## 2024-11-25 PROCEDURE — 25010000002 MORPHINE PER 10 MG: Performed by: EMERGENCY MEDICINE

## 2024-11-25 RX ORDER — IOPAMIDOL 755 MG/ML
100 INJECTION, SOLUTION INTRAVASCULAR
Status: COMPLETED | OUTPATIENT
Start: 2024-11-25 | End: 2024-11-25

## 2024-11-25 RX ORDER — ONDANSETRON 4 MG/1
TABLET, ORALLY DISINTEGRATING ORAL
Status: ON HOLD | COMMUNITY
Start: 2024-11-24 | End: 2024-11-26

## 2024-11-25 RX ORDER — CETIRIZINE HYDROCHLORIDE 5 MG/1
5 TABLET ORAL DAILY
Status: ON HOLD | COMMUNITY
Start: 2024-11-09

## 2024-11-25 RX ORDER — FUROSEMIDE 10 MG/ML
60 INJECTION INTRAMUSCULAR; INTRAVENOUS ONCE
Status: COMPLETED | OUTPATIENT
Start: 2024-11-25 | End: 2024-11-25

## 2024-11-25 RX ORDER — ONDANSETRON 2 MG/ML
4 INJECTION INTRAMUSCULAR; INTRAVENOUS ONCE
Status: COMPLETED | OUTPATIENT
Start: 2024-11-25 | End: 2024-11-25

## 2024-11-25 RX ORDER — POTASSIUM CHLORIDE 750 MG/1
10 CAPSULE, EXTENDED RELEASE ORAL DAILY
Status: ON HOLD | COMMUNITY
Start: 2024-11-12

## 2024-11-25 RX ORDER — LISINOPRIL 5 MG/1
5 TABLET ORAL DAILY
Status: ON HOLD | COMMUNITY
Start: 2024-11-20

## 2024-11-25 RX ORDER — SODIUM CHLORIDE 0.9 % (FLUSH) 0.9 %
10 SYRINGE (ML) INJECTION AS NEEDED
Status: DISCONTINUED | OUTPATIENT
Start: 2024-11-25 | End: 2024-12-09 | Stop reason: HOSPADM

## 2024-11-25 RX ORDER — LACTULOSE 10 G/15ML
30 SOLUTION ORAL DAILY
Status: DISCONTINUED | OUTPATIENT
Start: 2024-11-26 | End: 2024-11-26

## 2024-11-25 RX ADMIN — IOPAMIDOL 100 ML: 755 INJECTION, SOLUTION INTRAVENOUS at 20:38

## 2024-11-25 RX ADMIN — FUROSEMIDE 60 MG: 10 INJECTION, SOLUTION INTRAMUSCULAR; INTRAVENOUS at 21:18

## 2024-11-25 RX ADMIN — ONDANSETRON HYDROCHLORIDE 4 MG: 2 SOLUTION INTRAMUSCULAR; INTRAVENOUS at 19:17

## 2024-11-25 RX ADMIN — MORPHINE SULFATE 4 MG: 4 INJECTION, SOLUTION INTRAMUSCULAR; INTRAVENOUS at 19:17
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
## 2024-11-26 ENCOUNTER — APPOINTMENT (OUTPATIENT)
Dept: GENERAL RADIOLOGY | Facility: HOSPITAL | Age: 59
DRG: 441 | End: 2024-11-26
Payer: MEDICAID

## 2024-11-26 ENCOUNTER — APPOINTMENT (OUTPATIENT)
Dept: INTERVENTIONAL RADIOLOGY/VASCULAR | Facility: HOSPITAL | Age: 59
DRG: 441 | End: 2024-11-26
Payer: MEDICAID

## 2024-11-26 ENCOUNTER — PREP FOR SURGERY (OUTPATIENT)
Dept: OTHER | Facility: HOSPITAL | Age: 59
End: 2024-11-26
Payer: MEDICAID

## 2024-11-26 DIAGNOSIS — K27.4 GASTROINTESTINAL HEMORRHAGE ASSOCIATED WITH PEPTIC ULCER: Primary | ICD-10-CM

## 2024-11-26 PROBLEM — I50.9 CHF (CONGESTIVE HEART FAILURE): Status: ACTIVE | Noted: 2019-11-08

## 2024-11-26 LAB
ABO GROUP BLD: NORMAL
ANION GAP SERPL CALCULATED.3IONS-SCNC: 4.8 MMOL/L (ref 5–15)
APTT PPP: 37 SECONDS (ref 24.2–34.2)
BLD GP AB SCN SERPL QL: NEGATIVE
BUN SERPL-MCNC: 25 MG/DL (ref 6–20)
BUN/CREAT SERPL: 19.5 (ref 7–25)
CALCIUM SPEC-SCNC: 8.7 MG/DL (ref 8.6–10.5)
CHLORIDE SERPL-SCNC: 101 MMOL/L (ref 98–107)
CO2 SERPL-SCNC: 28.2 MMOL/L (ref 22–29)
CREAT SERPL-MCNC: 1.28 MG/DL (ref 0.76–1.27)
DEPRECATED RDW RBC AUTO: 58.4 FL (ref 37–54)
EGFRCR SERPLBLD CKD-EPI 2021: 64.9 ML/MIN/1.73
ERYTHROCYTE [DISTWIDTH] IN BLOOD BY AUTOMATED COUNT: 18.6 % (ref 12.3–15.4)
GLUCOSE BLDC GLUCOMTR-MCNC: 164 MG/DL (ref 70–99)
GLUCOSE BLDC GLUCOMTR-MCNC: 195 MG/DL (ref 70–99)
GLUCOSE BLDC GLUCOMTR-MCNC: 270 MG/DL (ref 70–99)
GLUCOSE BLDC GLUCOMTR-MCNC: 319 MG/DL (ref 70–99)
GLUCOSE SERPL-MCNC: 201 MG/DL (ref 65–99)
HCT VFR BLD AUTO: 21.8 % (ref 37.5–51)
HEMOCCULT STL QL IA: NEGATIVE
HGB BLD-MCNC: 6.8 G/DL (ref 13–17.7)
INR PPP: 2.11 (ref 0.86–1.15)
INR PPP: 2.3 (ref 0.86–1.15)
MCH RBC QN AUTO: 26.9 PG (ref 26.6–33)
MCHC RBC AUTO-ENTMCNC: 31.2 G/DL (ref 31.5–35.7)
MCV RBC AUTO: 86.2 FL (ref 79–97)
PLATELET # BLD AUTO: 71 10*3/MM3 (ref 140–450)
PMV BLD AUTO: 12 FL (ref 6–12)
POTASSIUM SERPL-SCNC: 3.7 MMOL/L (ref 3.5–5.2)
PROTHROMBIN TIME: 24 SECONDS (ref 11.8–14.9)
PROTHROMBIN TIME: 25.7 SECONDS (ref 11.8–14.9)
RBC # BLD AUTO: 2.53 10*6/MM3 (ref 4.14–5.8)
RETICS # AUTO: 0.07 10*6/MM3 (ref 0.02–0.13)
RETICS/RBC NFR AUTO: 2.39 % (ref 0.7–1.9)
RH BLD: POSITIVE
SODIUM SERPL-SCNC: 134 MMOL/L (ref 136–145)
T&S EXPIRATION DATE: NORMAL
WBC NRBC COR # BLD AUTO: 3.68 10*3/MM3 (ref 3.4–10.8)

## 2024-11-26 PROCEDURE — 36430 TRANSFUSION BLD/BLD COMPNT: CPT

## 2024-11-26 PROCEDURE — P9016 RBC LEUKOCYTES REDUCED: HCPCS

## 2024-11-26 PROCEDURE — 82274 ASSAY TEST FOR BLOOD FECAL: CPT | Performed by: FAMILY MEDICINE

## 2024-11-26 PROCEDURE — 82948 REAGENT STRIP/BLOOD GLUCOSE: CPT | Performed by: FAMILY MEDICINE

## 2024-11-26 PROCEDURE — 25010000002 CEFTRIAXONE PER 250 MG: Performed by: FAMILY MEDICINE

## 2024-11-26 PROCEDURE — 86901 BLOOD TYPING SEROLOGIC RH(D): CPT | Performed by: FAMILY MEDICINE

## 2024-11-26 PROCEDURE — 80048 BASIC METABOLIC PNL TOTAL CA: CPT | Performed by: FAMILY MEDICINE

## 2024-11-26 PROCEDURE — 63710000001 INSULIN GLARGINE PER 5 UNITS: Performed by: FAMILY MEDICINE

## 2024-11-26 PROCEDURE — 99233 SBSQ HOSP IP/OBS HIGH 50: CPT | Performed by: FAMILY MEDICINE

## 2024-11-26 PROCEDURE — 86900 BLOOD TYPING SEROLOGIC ABO: CPT

## 2024-11-26 PROCEDURE — 85610 PROTHROMBIN TIME: CPT | Performed by: FAMILY MEDICINE

## 2024-11-26 PROCEDURE — 82948 REAGENT STRIP/BLOOD GLUCOSE: CPT

## 2024-11-26 PROCEDURE — 25010000002 PHYTONADIONE 10 MG/ML SOLUTION 1 ML AMPULE: Performed by: INTERNAL MEDICINE

## 2024-11-26 PROCEDURE — 97161 PT EVAL LOW COMPLEX 20 MIN: CPT

## 2024-11-26 PROCEDURE — 25010000002 FUROSEMIDE PER 20 MG: Performed by: FAMILY MEDICINE

## 2024-11-26 PROCEDURE — 63710000001 INSULIN LISPRO (HUMAN) PER 5 UNITS: Performed by: FAMILY MEDICINE

## 2024-11-26 PROCEDURE — 25010000002 PHYTONADIONE 10 MG/ML SOLUTION 1 ML AMPULE: Performed by: FAMILY MEDICINE

## 2024-11-26 PROCEDURE — 71045 X-RAY EXAM CHEST 1 VIEW: CPT

## 2024-11-26 PROCEDURE — 85027 COMPLETE CBC AUTOMATED: CPT | Performed by: FAMILY MEDICINE

## 2024-11-26 PROCEDURE — 99222 1ST HOSP IP/OBS MODERATE 55: CPT | Performed by: INTERNAL MEDICINE

## 2024-11-26 PROCEDURE — 86900 BLOOD TYPING SEROLOGIC ABO: CPT | Performed by: FAMILY MEDICINE

## 2024-11-26 PROCEDURE — 85730 THROMBOPLASTIN TIME PARTIAL: CPT | Performed by: FAMILY MEDICINE

## 2024-11-26 PROCEDURE — 86923 COMPATIBILITY TEST ELECTRIC: CPT

## 2024-11-26 PROCEDURE — 25010000002 ONDANSETRON PER 1 MG: Performed by: FAMILY MEDICINE

## 2024-11-26 PROCEDURE — 25010000002 MORPHINE PER 10 MG: Performed by: FAMILY MEDICINE

## 2024-11-26 PROCEDURE — 86850 RBC ANTIBODY SCREEN: CPT | Performed by: FAMILY MEDICINE

## 2024-11-26 RX ORDER — BUSPIRONE HYDROCHLORIDE 7.5 MG/1
7.5 TABLET ORAL 3 TIMES DAILY
Status: DISCONTINUED | OUTPATIENT
Start: 2024-11-26 | End: 2024-12-09 | Stop reason: HOSPADM

## 2024-11-26 RX ORDER — CETIRIZINE HYDROCHLORIDE 10 MG/1
5 TABLET ORAL NIGHTLY
Status: DISCONTINUED | OUTPATIENT
Start: 2024-11-26 | End: 2024-12-09 | Stop reason: HOSPADM

## 2024-11-26 RX ORDER — NICOTINE POLACRILEX 4 MG
15 LOZENGE BUCCAL
Status: DISCONTINUED | OUTPATIENT
Start: 2024-11-26 | End: 2024-12-09 | Stop reason: HOSPADM

## 2024-11-26 RX ORDER — SPIRONOLACTONE 25 MG/1
100 TABLET ORAL DAILY
Status: DISCONTINUED | OUTPATIENT
Start: 2024-11-26 | End: 2024-12-09 | Stop reason: HOSPADM

## 2024-11-26 RX ORDER — AMOXICILLIN 250 MG
2 CAPSULE ORAL 2 TIMES DAILY PRN
Status: DISCONTINUED | OUTPATIENT
Start: 2024-11-26 | End: 2024-12-09 | Stop reason: HOSPADM

## 2024-11-26 RX ORDER — PANTOPRAZOLE SODIUM 40 MG/1
40 TABLET, DELAYED RELEASE ORAL DAILY
Status: DISCONTINUED | OUTPATIENT
Start: 2024-11-26 | End: 2024-11-26

## 2024-11-26 RX ORDER — CARVEDILOL 25 MG/1
25 TABLET ORAL 2 TIMES DAILY WITH MEALS
Status: DISCONTINUED | OUTPATIENT
Start: 2024-11-26 | End: 2024-12-05

## 2024-11-26 RX ORDER — SODIUM CHLORIDE 0.9 % (FLUSH) 0.9 %
10 SYRINGE (ML) INJECTION AS NEEDED
Status: DISCONTINUED | OUTPATIENT
Start: 2024-11-26 | End: 2024-12-09 | Stop reason: HOSPADM

## 2024-11-26 RX ORDER — TETRAHYDROZOLINE HCL 0.05 %
1 DROPS OPHTHALMIC (EYE) 2 TIMES DAILY
COMMUNITY

## 2024-11-26 RX ORDER — FAMOTIDINE 20 MG/1
20 TABLET, FILM COATED ORAL DAILY
Status: DISCONTINUED | OUTPATIENT
Start: 2024-11-26 | End: 2024-11-26

## 2024-11-26 RX ORDER — MORPHINE SULFATE 2 MG/ML
1 INJECTION, SOLUTION INTRAMUSCULAR; INTRAVENOUS ONCE
Status: COMPLETED | OUTPATIENT
Start: 2024-11-26 | End: 2024-11-26

## 2024-11-26 RX ORDER — SODIUM CHLORIDE 0.9 % (FLUSH) 0.9 %
10 SYRINGE (ML) INJECTION EVERY 12 HOURS SCHEDULED
Status: DISCONTINUED | OUTPATIENT
Start: 2024-11-26 | End: 2024-12-09 | Stop reason: HOSPADM

## 2024-11-26 RX ORDER — FLUTICASONE PROPIONATE 50 MCG
1 SPRAY, SUSPENSION (ML) NASAL DAILY
Status: DISCONTINUED | OUTPATIENT
Start: 2024-11-26 | End: 2024-12-09 | Stop reason: HOSPADM

## 2024-11-26 RX ORDER — FUROSEMIDE 80 MG/1
80 TABLET ORAL EVERY MORNING
COMMUNITY

## 2024-11-26 RX ORDER — SODIUM CHLORIDE 9 MG/ML
40 INJECTION, SOLUTION INTRAVENOUS AS NEEDED
Status: DISCONTINUED | OUTPATIENT
Start: 2024-11-26 | End: 2024-12-09 | Stop reason: HOSPADM

## 2024-11-26 RX ORDER — PANTOPRAZOLE SODIUM 40 MG/1
40 TABLET, DELAYED RELEASE ORAL
Status: DISCONTINUED | OUTPATIENT
Start: 2024-11-26 | End: 2024-11-26

## 2024-11-26 RX ORDER — SERTRALINE HYDROCHLORIDE 100 MG/1
100 TABLET, FILM COATED ORAL NIGHTLY
Status: DISCONTINUED | OUTPATIENT
Start: 2024-11-26 | End: 2024-12-09 | Stop reason: HOSPADM

## 2024-11-26 RX ORDER — ONDANSETRON 2 MG/ML
4 INJECTION INTRAMUSCULAR; INTRAVENOUS EVERY 6 HOURS PRN
Status: DISCONTINUED | OUTPATIENT
Start: 2024-11-26 | End: 2024-12-09 | Stop reason: HOSPADM

## 2024-11-26 RX ORDER — DEXTROSE MONOHYDRATE 25 G/50ML
25 INJECTION, SOLUTION INTRAVENOUS
Status: DISCONTINUED | OUTPATIENT
Start: 2024-11-26 | End: 2024-12-09 | Stop reason: HOSPADM

## 2024-11-26 RX ORDER — HYDROXYZINE HYDROCHLORIDE 25 MG/1
25 TABLET, FILM COATED ORAL EVERY 8 HOURS PRN
Status: DISCONTINUED | OUTPATIENT
Start: 2024-11-26 | End: 2024-12-09 | Stop reason: HOSPADM

## 2024-11-26 RX ORDER — SACCHAROMYCES BOULARDII 250 MG
250 CAPSULE ORAL 2 TIMES DAILY
Status: DISCONTINUED | OUTPATIENT
Start: 2024-11-26 | End: 2024-12-09 | Stop reason: HOSPADM

## 2024-11-26 RX ORDER — OXYCODONE HYDROCHLORIDE 5 MG/1
10 TABLET ORAL EVERY 8 HOURS PRN
Status: DISCONTINUED | OUTPATIENT
Start: 2024-11-26 | End: 2024-12-09 | Stop reason: HOSPADM

## 2024-11-26 RX ORDER — IBUPROFEN 600 MG/1
1 TABLET ORAL
Status: DISCONTINUED | OUTPATIENT
Start: 2024-11-26 | End: 2024-12-09 | Stop reason: HOSPADM

## 2024-11-26 RX ORDER — INSULIN LISPRO 100 [IU]/ML
2-9 INJECTION, SOLUTION INTRAVENOUS; SUBCUTANEOUS
Status: DISCONTINUED | OUTPATIENT
Start: 2024-11-26 | End: 2024-12-09 | Stop reason: HOSPADM

## 2024-11-26 RX ORDER — GINSENG 100 MG
1 CAPSULE ORAL 3 TIMES DAILY
Status: DISCONTINUED | OUTPATIENT
Start: 2024-11-26 | End: 2024-12-09 | Stop reason: HOSPADM

## 2024-11-26 RX ORDER — SUCRALFATE 1 G/1
1 TABLET ORAL 4 TIMES DAILY
Status: DISCONTINUED | OUTPATIENT
Start: 2024-11-26 | End: 2024-12-09 | Stop reason: HOSPADM

## 2024-11-26 RX ORDER — LEVETIRACETAM 500 MG/1
500 TABLET ORAL 2 TIMES DAILY
Status: DISCONTINUED | OUTPATIENT
Start: 2024-11-26 | End: 2024-12-09 | Stop reason: HOSPADM

## 2024-11-26 RX ORDER — INSULIN DETEMIR 100 [IU]/ML
10 INJECTION, SOLUTION SUBCUTANEOUS NIGHTLY
COMMUNITY

## 2024-11-26 RX ORDER — LACTULOSE 10 G/15ML
20 SOLUTION ORAL 4 TIMES DAILY
Status: DISCONTINUED | OUTPATIENT
Start: 2024-11-26 | End: 2024-11-26

## 2024-11-26 RX ORDER — FUROSEMIDE 20 MG/1
20 TABLET ORAL DAILY
COMMUNITY

## 2024-11-26 RX ORDER — POLYETHYLENE GLYCOL 3350 17 G/17G
17 POWDER, FOR SOLUTION ORAL DAILY PRN
Status: DISCONTINUED | OUTPATIENT
Start: 2024-11-26 | End: 2024-12-09 | Stop reason: HOSPADM

## 2024-11-26 RX ORDER — BISACODYL 10 MG
10 SUPPOSITORY, RECTAL RECTAL DAILY PRN
Status: DISCONTINUED | OUTPATIENT
Start: 2024-11-26 | End: 2024-12-09 | Stop reason: HOSPADM

## 2024-11-26 RX ORDER — SPIRONOLACTONE 100 MG/1
100 TABLET, FILM COATED ORAL DAILY
Status: ON HOLD | COMMUNITY
End: 2024-12-09

## 2024-11-26 RX ORDER — FUROSEMIDE 10 MG/ML
40 INJECTION INTRAMUSCULAR; INTRAVENOUS EVERY 12 HOURS
Status: DISCONTINUED | OUTPATIENT
Start: 2024-11-26 | End: 2024-12-02

## 2024-11-26 RX ORDER — FUROSEMIDE 40 MG/1
80 TABLET ORAL DAILY
Status: DISCONTINUED | OUTPATIENT
Start: 2024-11-26 | End: 2024-11-26

## 2024-11-26 RX ORDER — OXYCODONE HYDROCHLORIDE 5 MG/1
5 TABLET ORAL ONCE
Status: COMPLETED | OUTPATIENT
Start: 2024-11-26 | End: 2024-11-26

## 2024-11-26 RX ORDER — BISACODYL 5 MG/1
5 TABLET, DELAYED RELEASE ORAL DAILY PRN
Status: DISCONTINUED | OUTPATIENT
Start: 2024-11-26 | End: 2024-12-09 | Stop reason: HOSPADM

## 2024-11-26 RX ORDER — LACTULOSE 10 G/15ML
30 SOLUTION ORAL 4 TIMES DAILY
Status: DISCONTINUED | OUTPATIENT
Start: 2024-11-26 | End: 2024-12-09 | Stop reason: HOSPADM

## 2024-11-26 RX ORDER — HEPARIN SODIUM 5000 [USP'U]/ML
5000 INJECTION, SOLUTION INTRAVENOUS; SUBCUTANEOUS EVERY 12 HOURS SCHEDULED
Status: DISCONTINUED | OUTPATIENT
Start: 2024-11-26 | End: 2024-11-26

## 2024-11-26 RX ORDER — ROPINIROLE 1 MG/1
1 TABLET, FILM COATED ORAL NIGHTLY
Status: DISCONTINUED | OUTPATIENT
Start: 2024-11-26 | End: 2024-12-09 | Stop reason: HOSPADM

## 2024-11-26 RX ORDER — PANTOPRAZOLE SODIUM 40 MG/10ML
40 INJECTION, POWDER, LYOPHILIZED, FOR SOLUTION INTRAVENOUS EVERY 12 HOURS SCHEDULED
Status: DISCONTINUED | OUTPATIENT
Start: 2024-11-26 | End: 2024-12-01

## 2024-11-26 RX ADMIN — INSULIN GLARGINE 10 UNITS: 100 INJECTION, SOLUTION SUBCUTANEOUS at 20:49

## 2024-11-26 RX ADMIN — CARVEDILOL 25 MG: 25 TABLET, FILM COATED ORAL at 16:27

## 2024-11-26 RX ADMIN — INSULIN LISPRO 6 UNITS: 100 INJECTION, SOLUTION INTRAVENOUS; SUBCUTANEOUS at 20:49

## 2024-11-26 RX ADMIN — OXYCODONE 5 MG: 5 TABLET ORAL at 08:44

## 2024-11-26 RX ADMIN — FLUTICASONE PROPIONATE 1 SPRAY: 50 SPRAY, METERED NASAL at 14:09

## 2024-11-26 RX ADMIN — LEVETIRACETAM 500 MG: 500 TABLET, FILM COATED ORAL at 20:49

## 2024-11-26 RX ADMIN — MORPHINE SULFATE 1 MG: 2 INJECTION, SOLUTION INTRAMUSCULAR; INTRAVENOUS at 02:33

## 2024-11-26 RX ADMIN — FUROSEMIDE 40 MG: 10 INJECTION, SOLUTION INTRAMUSCULAR; INTRAVENOUS at 06:10

## 2024-11-26 RX ADMIN — ROPINIROLE HYDROCHLORIDE 1 MG: 1 TABLET, FILM COATED ORAL at 02:28

## 2024-11-26 RX ADMIN — Medication 10 ML: at 20:50

## 2024-11-26 RX ADMIN — CETIRIZINE HYDROCHLORIDE 5 MG: 10 TABLET, FILM COATED ORAL at 20:59

## 2024-11-26 RX ADMIN — Medication 10 ML: at 08:50

## 2024-11-26 RX ADMIN — PANTOPRAZOLE SODIUM 40 MG: 40 INJECTION, POWDER, FOR SOLUTION INTRAVENOUS at 21:02

## 2024-11-26 RX ADMIN — INSULIN LISPRO 7 UNITS: 100 INJECTION, SOLUTION INTRAVENOUS; SUBCUTANEOUS at 16:27

## 2024-11-26 RX ADMIN — CETIRIZINE HYDROCHLORIDE 5 MG: 10 TABLET, FILM COATED ORAL at 02:28

## 2024-11-26 RX ADMIN — SUCRALFATE 1 G: 1 TABLET ORAL at 16:27

## 2024-11-26 RX ADMIN — BUSPIRONE HYDROCHLORIDE 7.5 MG: 7.5 TABLET ORAL at 08:45

## 2024-11-26 RX ADMIN — ROPINIROLE HYDROCHLORIDE 1 MG: 1 TABLET, FILM COATED ORAL at 20:59

## 2024-11-26 RX ADMIN — SUCRALFATE 1 G: 1 TABLET ORAL at 08:44

## 2024-11-26 RX ADMIN — BUSPIRONE HYDROCHLORIDE 7.5 MG: 7.5 TABLET ORAL at 16:27

## 2024-11-26 RX ADMIN — FUROSEMIDE 40 MG: 10 INJECTION, SOLUTION INTRAMUSCULAR; INTRAVENOUS at 16:27

## 2024-11-26 RX ADMIN — SUCRALFATE 1 G: 1 TABLET ORAL at 14:11

## 2024-11-26 RX ADMIN — PHYTONADIONE 10 MG: 10 INJECTION, EMULSION INTRAMUSCULAR; INTRAVENOUS; SUBCUTANEOUS at 08:45

## 2024-11-26 RX ADMIN — SERTRALINE HYDROCHLORIDE 100 MG: 100 TABLET ORAL at 20:59

## 2024-11-26 RX ADMIN — RIFAXIMIN 550 MG: 550 TABLET ORAL at 14:15

## 2024-11-26 RX ADMIN — SERTRALINE HYDROCHLORIDE 100 MG: 100 TABLET ORAL at 02:28

## 2024-11-26 RX ADMIN — BACITRACIN 0.9 G: 500 OINTMENT TOPICAL at 16:27

## 2024-11-26 RX ADMIN — OXYCODONE 10 MG: 5 TABLET ORAL at 14:10

## 2024-11-26 RX ADMIN — CARVEDILOL 25 MG: 25 TABLET, FILM COATED ORAL at 08:44

## 2024-11-26 RX ADMIN — Medication 250 MG: at 14:11

## 2024-11-26 RX ADMIN — SUCRALFATE 1 G: 1 TABLET ORAL at 20:49

## 2024-11-26 RX ADMIN — LACTULOSE 30 G: 20 SOLUTION ORAL at 14:10

## 2024-11-26 RX ADMIN — OXYCODONE 10 MG: 5 TABLET ORAL at 22:05

## 2024-11-26 RX ADMIN — PANTOPRAZOLE SODIUM 40 MG: 40 TABLET, DELAYED RELEASE ORAL at 08:44

## 2024-11-26 RX ADMIN — INSULIN LISPRO 2 UNITS: 100 INJECTION, SOLUTION INTRAVENOUS; SUBCUTANEOUS at 08:43

## 2024-11-26 RX ADMIN — PANTOPRAZOLE SODIUM 40 MG: 40 INJECTION, POWDER, FOR SOLUTION INTRAVENOUS at 16:26

## 2024-11-26 RX ADMIN — LACTULOSE 30 G: 20 SOLUTION ORAL at 08:45

## 2024-11-26 RX ADMIN — LACTULOSE 30 G: 20 SOLUTION ORAL at 16:27

## 2024-11-26 RX ADMIN — PHYTONADIONE 10 MG: 10 INJECTION, EMULSION INTRAMUSCULAR; INTRAVENOUS; SUBCUTANEOUS at 17:50

## 2024-11-26 RX ADMIN — RIFAXIMIN 550 MG: 550 TABLET ORAL at 02:28

## 2024-11-26 RX ADMIN — BACITRACIN 0.9 G: 500 OINTMENT TOPICAL at 20:49

## 2024-11-26 RX ADMIN — LEVETIRACETAM 500 MG: 500 TABLET, FILM COATED ORAL at 08:44

## 2024-11-26 RX ADMIN — Medication 2000 MG: at 16:26

## 2024-11-26 RX ADMIN — Medication 250 MG: at 20:48

## 2024-11-26 RX ADMIN — LACTULOSE 30 G: 20 SOLUTION ORAL at 20:48

## 2024-11-26 RX ADMIN — SPIRONOLACTONE 100 MG: 25 TABLET ORAL at 08:44

## 2024-11-26 RX ADMIN — BUSPIRONE HYDROCHLORIDE 7.5 MG: 7.5 TABLET ORAL at 20:48

## 2024-11-26 NOTE — PLAN OF CARE
Problem: Fall Injury Risk  Goal: Absence of Fall and Fall-Related Injury  Outcome: Progressing     Problem: Wound  Goal: Skin Health and Integrity  Outcome: Progressing     Problem: Anemia  Goal: Anemia Symptom Improvement  Outcome: Progressing   Goal Outcome Evaluation:      Patient received 1 unit PRBC. Patient had a bm today. Will be NPO after midnight for a possible EGD and paracentesis tomorrow.  Bactrim applied to great rt toe. Patient refuses bed alarm.

## 2024-11-26 NOTE — PLAN OF CARE
Goal Outcome Evaluation:  Plan of Care Reviewed With: patient        Progress: no change  Outcome Evaluation: Patient does not present with any significant decline in functional mobility requiring inpatient PT services. He is completing transfers and ambulating independently and is safe to continue doing so until his discharge from the hospital.    Anticipated Discharge Disposition (PT): extended care facility
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
## 2024-11-26 NOTE — CONSULTS
Palliative care is familiar with patient from previous admissions. Consult placed for goals of care. At time of visit patient is alert and oriented to person, place, time and situation. Introduced self and explained role and purpose of visit. Patient requests his sister be involved in discussions. Call placed to patients sister while in room. Provided update and discussed goals of care. Patient reports he is awaiting the procedure tomorrow and is hopeful that it will give some answers. Provided education on cirrhosis being a chronic illness. Education provided on code status included cpr versus dnr and education provided on [place] MOST- patient requests to be DNR/DNI and requests to complete [place] MOST. Offered to complete living will for [facility]CS- patient requests to wait and speak with his daughter. Emotional support provided. Patients sister on phone agreeable. Updated provider. Palliative care will continue to follow to support and assist.    Lilia LUA, RN, PN  Palliative Care
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
## 2024-11-26 NOTE — PROGRESS NOTES
[facility]   Hospitalist Progress Note  Date: 2024  Patient Name: Nic Nicolas  : 1965  MRN: [id number]  Date of admission: 2024      Subjective   Subjective     Chief Complaint: Shortness of breath confusion hematemesis    Summary: Patient is a 59-year-old male past medical history significant for cirrhosis, TBI, diabetes, hypertension, hyperlipidemia, CHF, CKD, asthma, obesity, recurrent hepatic encephalopathy that presents to the emergency department after being seen by his gastroenterologist for confusion and unsteadiness shortness of breath and a week of hematemesis patient evaluated emergency department was encephalopathic with elevated ammonia appeared volume overloaded patient with worsening anemia has been admitted to the hospitalist service GI consulted patient transfused 1 unit of packed red blood cell will likely need endoscopic evaluation started on ceftriaxone for decompensated cirrhosis continuing rifaximin and lactulose continue with Carafate adding IV PPI    Interval Followup: Seen and examined resting comfortably mental status seems close to baseline hemoglobin 6.8 today transfusing unit of packed red blood cells continue with IV PPI continue with Carafate GI consultation for possible EGD remains grossly volume overloaded continue with IV Lasix continue with Aldactone continue fluid restriction salt restriction proceed with therapeutic paracentesis.  Ceftriaxone initiated for decompensated cirrhosis and in the setting of possible GI bleeding      Objective   Objective     Vitals:   Temp:  [97.8 °F (36.6 °C)-98.2 °F (36.8 °C)] 98.2 °F (36.8 °C)  Heart Rate:  [66-87] 78  Resp:  [17-20] 18  BP: (133-163)/(52-78) 133/63  Physical Exam   Constitutional: Awake alert no acute distress  Respiratory diminished  Cardiovascular RRR  GI: Abdomen soft mildly tender distended  Extremities edematous    Result Review    Result Review:  I have personally reviewed the pertinent results  from the past 24 hours to 11/26/2024 12:02 EST and agree with these findings:  [x]  Laboratory   CBC          11/4/2024    05:17 11/25/2024    19:37 11/26/2024    05:24   CBC   WBC 3.76  4.78  3.68    RBC 3.09  2.84  2.53    Hemoglobin 8.5  7.6  6.8    Hematocrit 27.3  24.8  21.8    MCV 88.3  87.3  86.2    MCH 27.5  26.8  26.9    MCHC 31.1  30.6  31.2    RDW 20.9  18.7  18.6    Platelets 62  75  71      BMP          11/4/2024    05:17 11/25/2024    18:18 11/26/2024    05:24   BMP   BUN 20  27  25    Creatinine 1.27  1.34  1.28    Sodium 141  138  134    Potassium 3.6  4.4  3.7    Chloride 105  102  101    CO2 25.4  26.6  28.2    Calcium 8.6  8.6  8.7      LIVER FUNCTION TESTS:      Lab 11/25/24  1818   TOTAL PROTEIN 6.3   ALBUMIN 3.1*   GLOBULIN 3.2   ALT (SGPT) 22   AST (SGOT) 45*   BILIRUBIN 1.6*   ALK PHOS 142*   LIPASE 37       [x]  Microbiology   Microbiology Results (last 10 days)       ** No results found for the last 240 hours. **              [x]  Radiology XR Chest 1 View    Result Date: 11/26/2024  Mild infiltrate or atelectasis at the right base. Electronically Signed: Kye Breaux MD  11/26/2024 5:47 AM EST  Workstation ID: [id number]    CT Abdomen Pelvis With Contrast    Result Date: 11/25/2024  Impression: 1.Small to moderate volume ascites most pronounced in the right upper and lower quadrants. 2.Cirrhosis with splenomegaly. 3.Cholelithiasis without gallbladder inflammatory changes. 4.Fat and fluid-containing umbilical hernia, similar to prior examination. 5.Increasing simple appearing fluid within the left inguinal canal now measuring 5.4 cm in diameter, likely ascites related. 6.Mild generalized body wall edema. Electronically Signed: Emanuel Dewey MD  11/25/2024 8:48 PM EST  Workstation ID: [id number]       []  EKG/Telemetry   No orders to display       []  Cardiology/Vascular   []  Pathology  [x]  Old records  []  Other:    Assessment & Plan   Assessment / Plan      Assessment:    Hematemesis  Suspected acute upper GI bleed  Acute on chronic anemia  Acute decompensated cirrhosis  Acute on chronic diastolic congestive heart failure  Anasarca  Thrombocytopenia secondary to cirrhosis  Hepatic encephalopathy  CKD stage III  History of TBI  Splenomegaly  Diabetes  History of DVT    Plan:    Given complaints of hematemesis with worsening anemia start IV PPI continue Carafate consult GI for possible endoscopic evaluation  Patient grossly volume overloaded continue with IV diuretics Lasix 40 IV twice daily continue Aldactone start fluid restriction salt restriction  Hepatic encephalopathy continuing with lactulose and rifaximin  Starting ceftriaxone for decompensated liver cirrhosis  Monitor H&H closely  Small amount of ascites noted on CT scan may be amenable to paracentesis will arrange with IR  Continue basal bolus insulin  Wound care consultation  A.m. labs  Further treatment contingent upon his hospital course  Long-term prognosis poor     Discussed plan with RN.    VTE Prophylaxis:  Mechanical VTE prophylaxis orders are present.        CODE STATUS:   Level Of Support Discussed With: Patient  Code Status (Patient has no pulse and is not breathing): CPR (Attempt to Resuscitate)  Medical Interventions (Patient has pulse or is breathing): Full Support      Attending documentation:  I reviewed the above documentation and independently reviewed and rounded and evaluated the patient and discussed the care plan with TERESA Woodard PA-C, I agree with his findings and plan as documented, what I have added to the care plan and modified is as follows in my documentation and my medical decision making; 59-year-old male with history of TBI, sleep apnea, hypertension, history of DVT, diabetes, depression, liver cirrhosis with splenomegaly, anxiety, history of gastric ulcers with bleed, chronic diastolic CHF, hospitalized on 11/25/2024 with chief complaint of unsteadiness, confusion,  shortness of breath, found to have an ammonia level of 69, acute anemia requiring blood transfusion, creatinine 1.28, sodium 134, coagulopathy due to liver disease, PRBC ordered for transfusion, IR consulted for paracentesis of ascites fluid, reported hematemesis for a week, seen in GI clinic, sent to the emergency room, interval follow-up: Patient seen and examined this morning, no acute distress, no acute major overnight events, still confused, working towards achieving bowel movements, no chest pain or palpitations.  Still shortness of breath with exertional activity with acute anemia hemoglobin down to 6.8, no signs of acute bleeding, 1 PRBC ordered for transfusion.  PPI increased to twice daily, no signs of GI bleed but high risk, will continue to follow.  Review of systems obtained, all systems reviewed and negative except weakness and fatigue, abdominal discomfort, nausea, edema.  On physical exam elderly appearing male confused, no acute distress, edematous, regular rate rhythm, diminished breath sounds, soft nontender abdomen, with distention, pallor, lower extreme edema.  Alert and oriented to self.  Assessment as above, volume overloaded, acute anemia, concern for hematemesis prior to hospitalization, likely acute blood loss anemia, history of GAVE, long-term nursing home resident, with other medical history as listed above with hyperammonemia and hepatic encephalopathy, plan, increase lactulose to 30 g 4 times a day, switch Protonix to IV 40 mg twice daily, continue rifaximin 550 mg twice a day, continue Carafate, spironolactone, Lasix 40 mg IV twice daily, strict I's and O's, daily weights, start ceftriaxone empirically for intra-abdominal infection a patient with liver cirrhosis concern for GI bleed, 1 unit PRBC ordered for transfusion, discussed with patient's sister on the phone, GI consulted discussed at length with Dr. Roblero, plans to see and may require scope, insulin sliding cell coverage,  n.p.o. with sips with meds, a.m. labs, full code, DVT prophylaxis with SCDs, clinical course dictate further management of this critically ill complicated patient with concern for hepatic encephalopathy in the setting of hematemesis and GI bleed requiring consultation with specialty services and a number of changes to his medication regimen requiring detailed analysis and thought.  More than 90 % of the time of this patient's encounter was performed by me, this included face-to-face time, planning and coordinating, medical decision making and critical thinking personally done by me.      Electronically signed by Bandar Burch MD, 11/26/2024, 12:02 EST.    Portions of this documentation were transcribed electronically from a voice recognition software.  I confirm all data accurately represents the service(s) I performed at today's visit.

## 2024-11-26 NOTE — PLAN OF CARE
Goal Outcome Evaluation:  Plan of Care Reviewed With: patient        Progress: no change  Outcome Evaluation: A & O x 4, C/O pain in right great toe. Per patient, hurt great right toe at nursing facility walking and hitting on a door frame, reddened and swollen, painful to touch, marked on LDA. Medication given to help ease pain per order, appears to be tolerating well. Patient is a falls risk, refused bed alarm, educated on importance of asking for help when ambulating. Ambulates with walker brought with patient, tolerates walking to bathroom without an unsteady gait noted.
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
## 2024-11-27 ENCOUNTER — ANESTHESIA EVENT (OUTPATIENT)
Dept: GASTROENTEROLOGY | Facility: HOSPITAL | Age: 59
End: 2024-11-27
Payer: MEDICAID

## 2024-11-27 ENCOUNTER — APPOINTMENT (OUTPATIENT)
Dept: INTERVENTIONAL RADIOLOGY/VASCULAR | Facility: HOSPITAL | Age: 59
DRG: 441 | End: 2024-11-27
Payer: MEDICAID

## 2024-11-27 ENCOUNTER — ANESTHESIA (OUTPATIENT)
Dept: GASTROENTEROLOGY | Facility: HOSPITAL | Age: 59
End: 2024-11-27
Payer: MEDICAID

## 2024-11-27 LAB
ALBUMIN FLD-MCNC: 0.3 G/DL
ALBUMIN SERPL-MCNC: 2.8 G/DL (ref 3.5–5.2)
ALBUMIN/GLOB SERPL: 1 G/DL
ALP SERPL-CCNC: 123 U/L (ref 39–117)
ALT SERPL W P-5'-P-CCNC: 19 U/L (ref 1–41)
AMYLASE FLD-CCNC: 12 U/L
ANION GAP SERPL CALCULATED.3IONS-SCNC: 9.1 MMOL/L (ref 5–15)
APPEARANCE FLD: CLEAR
AST SERPL-CCNC: 43 U/L (ref 1–40)
BASOPHILS # BLD AUTO: 0.02 10*3/MM3 (ref 0–0.2)
BASOPHILS NFR BLD AUTO: 0.5 % (ref 0–1.5)
BH BB BLOOD EXPIRATION DATE: NORMAL
BH BB BLOOD TYPE BARCODE: 600
BH BB DISPENSE STATUS: NORMAL
BH BB PRODUCT CODE: NORMAL
BH BB UNIT NUMBER: NORMAL
BILIRUB CONJ SERPL-MCNC: 0.7 MG/DL (ref 0–0.3)
BILIRUB SERPL-MCNC: 1.7 MG/DL (ref 0–1.2)
BUN SERPL-MCNC: 25 MG/DL (ref 6–20)
BUN/CREAT SERPL: 18.2 (ref 7–25)
CALCIUM SPEC-SCNC: 8.8 MG/DL (ref 8.6–10.5)
CHLORIDE SERPL-SCNC: 101 MMOL/L (ref 98–107)
CO2 SERPL-SCNC: 27.9 MMOL/L (ref 22–29)
COLOR FLD: NORMAL
CREAT SERPL-MCNC: 1.37 MG/DL (ref 0.76–1.27)
CROSSMATCH INTERPRETATION: NORMAL
DEPRECATED RDW RBC AUTO: 61.7 FL (ref 37–54)
EGFRCR SERPLBLD CKD-EPI 2021: 59.8 ML/MIN/1.73
EOSINOPHIL # BLD AUTO: 0.13 10*3/MM3 (ref 0–0.4)
EOSINOPHIL NFR BLD AUTO: 3.5 % (ref 0.3–6.2)
ERYTHROCYTE [DISTWIDTH] IN BLOOD BY AUTOMATED COUNT: 19.7 % (ref 12.3–15.4)
FOLATE SERPL-MCNC: 7.54 NG/ML (ref 4.78–24.2)
GLOBULIN UR ELPH-MCNC: 2.8 GM/DL
GLUCOSE BLDC GLUCOMTR-MCNC: 140 MG/DL (ref 70–99)
GLUCOSE BLDC GLUCOMTR-MCNC: 149 MG/DL (ref 70–99)
GLUCOSE BLDC GLUCOMTR-MCNC: 221 MG/DL (ref 70–99)
GLUCOSE BLDC GLUCOMTR-MCNC: 254 MG/DL (ref 70–99)
GLUCOSE FLD-MCNC: 158 MG/DL
GLUCOSE SERPL-MCNC: 167 MG/DL (ref 65–99)
HCT VFR BLD AUTO: 24.6 % (ref 37.5–51)
HGB BLD-MCNC: 7.5 G/DL (ref 13–17.7)
IMM GRANULOCYTES # BLD AUTO: 0.02 10*3/MM3 (ref 0–0.05)
IMM GRANULOCYTES NFR BLD AUTO: 0.5 % (ref 0–0.5)
INR PPP: 2.16 (ref 0.86–1.15)
IRON 24H UR-MRATE: 143 MCG/DL (ref 59–158)
IRON SATN MFR SERPL: 44 % (ref 20–50)
LDH FLD-CCNC: 56 U/L
LYMPHOCYTES # BLD AUTO: 0.74 10*3/MM3 (ref 0.7–3.1)
LYMPHOCYTES NFR BLD AUTO: 20.1 % (ref 19.6–45.3)
LYMPHOCYTES NFR FLD MANUAL: 31 %
MACROPHAGE FLUID %: 36 %
MAGNESIUM SERPL-MCNC: 1.6 MG/DL (ref 1.6–2.6)
MCH RBC QN AUTO: 26 PG (ref 26.6–33)
MCHC RBC AUTO-ENTMCNC: 30.5 G/DL (ref 31.5–35.7)
MCV RBC AUTO: 85.1 FL (ref 79–97)
MESOTHL CELL NFR FLD MANUAL: 2 %
MONOCYTES # BLD AUTO: 0.4 10*3/MM3 (ref 0.1–0.9)
MONOCYTES NFR BLD AUTO: 10.8 % (ref 5–12)
MONOCYTES NFR FLD: 27 %
NEUTROPHILS NFR BLD AUTO: 2.38 10*3/MM3 (ref 1.7–7)
NEUTROPHILS NFR BLD AUTO: 64.6 % (ref 42.7–76)
NEUTROPHILS NFR FLD MANUAL: 4 %
NRBC BLD AUTO-RTO: 0 /100 WBC (ref 0–0.2)
NUC CELL # FLD: 277 /MM3
PHOSPHATE SERPL-MCNC: 3.9 MG/DL (ref 2.5–4.5)
PLATELET # BLD AUTO: 53 10*3/MM3 (ref 140–450)
PMV BLD AUTO: ABNORMAL FL
POTASSIUM SERPL-SCNC: 4.2 MMOL/L (ref 3.5–5.2)
PROT FLD-MCNC: <1 G/DL
PROT SERPL-MCNC: 5.6 G/DL (ref 6–8.5)
PROTHROMBIN TIME: 24.4 SECONDS (ref 11.8–14.9)
RBC # BLD AUTO: 2.89 10*6/MM3 (ref 4.14–5.8)
RBC # FLD AUTO: 2000 /MM3
SODIUM SERPL-SCNC: 138 MMOL/L (ref 136–145)
TIBC SERPL-MCNC: 326 MCG/DL (ref 298–536)
TRANSFERRIN SERPL-MCNC: 219 MG/DL (ref 200–360)
UNIT  ABO: NORMAL
UNIT  RH: NORMAL
VIT B12 BLD-MCNC: 809 PG/ML (ref 211–946)
WBC NRBC COR # BLD AUTO: 3.69 10*3/MM3 (ref 3.4–10.8)

## 2024-11-27 PROCEDURE — 82150 ASSAY OF AMYLASE: CPT | Performed by: FAMILY MEDICINE

## 2024-11-27 PROCEDURE — 82948 REAGENT STRIP/BLOOD GLUCOSE: CPT | Performed by: INTERNAL MEDICINE

## 2024-11-27 PROCEDURE — 82948 REAGENT STRIP/BLOOD GLUCOSE: CPT

## 2024-11-27 PROCEDURE — 85610 PROTHROMBIN TIME: CPT | Performed by: INTERNAL MEDICINE

## 2024-11-27 PROCEDURE — 94761 N-INVAS EAR/PLS OXIMETRY MLT: CPT

## 2024-11-27 PROCEDURE — 25010000002 CEFTRIAXONE PER 250 MG: Performed by: INTERNAL MEDICINE

## 2024-11-27 PROCEDURE — 83540 ASSAY OF IRON: CPT | Performed by: PHYSICIAN ASSISTANT

## 2024-11-27 PROCEDURE — 82607 VITAMIN B-12: CPT | Performed by: PHYSICIAN ASSISTANT

## 2024-11-27 PROCEDURE — 0W9G3ZX DRAINAGE OF PERITONEAL CAVITY, PERCUTANEOUS APPROACH, DIAGNOSTIC: ICD-10-PCS | Performed by: FAMILY MEDICINE

## 2024-11-27 PROCEDURE — 82042 OTHER SOURCE ALBUMIN QUAN EA: CPT | Performed by: FAMILY MEDICINE

## 2024-11-27 PROCEDURE — 25010000002 PROPOFOL 10 MG/ML EMULSION: Performed by: NURSE ANESTHETIST, CERTIFIED REGISTERED

## 2024-11-27 PROCEDURE — 82948 REAGENT STRIP/BLOOD GLUCOSE: CPT | Performed by: FAMILY MEDICINE

## 2024-11-27 PROCEDURE — 80053 COMPREHEN METABOLIC PANEL: CPT | Performed by: INTERNAL MEDICINE

## 2024-11-27 PROCEDURE — 89051 BODY FLUID CELL COUNT: CPT | Performed by: FAMILY MEDICINE

## 2024-11-27 PROCEDURE — 25010000002 FUROSEMIDE PER 20 MG: Performed by: INTERNAL MEDICINE

## 2024-11-27 PROCEDURE — 82746 ASSAY OF FOLIC ACID SERUM: CPT | Performed by: PHYSICIAN ASSISTANT

## 2024-11-27 PROCEDURE — 94799 UNLISTED PULMONARY SVC/PX: CPT

## 2024-11-27 PROCEDURE — 83615 LACTATE (LD) (LDH) ENZYME: CPT | Performed by: FAMILY MEDICINE

## 2024-11-27 PROCEDURE — 25010000002 MORPHINE PER 10 MG: Performed by: INTERNAL MEDICINE

## 2024-11-27 PROCEDURE — 85025 COMPLETE CBC W/AUTO DIFF WBC: CPT | Performed by: FAMILY MEDICINE

## 2024-11-27 PROCEDURE — 87205 SMEAR GRAM STAIN: CPT | Performed by: FAMILY MEDICINE

## 2024-11-27 PROCEDURE — 43255 EGD CONTROL BLEEDING ANY: CPT | Performed by: INTERNAL MEDICINE

## 2024-11-27 PROCEDURE — 88184 FLOWCYTOMETRY/ TC 1 MARKER: CPT

## 2024-11-27 PROCEDURE — 82248 BILIRUBIN DIRECT: CPT | Performed by: FAMILY MEDICINE

## 2024-11-27 PROCEDURE — 25010000002 FUROSEMIDE PER 20 MG: Performed by: FAMILY MEDICINE

## 2024-11-27 PROCEDURE — 83735 ASSAY OF MAGNESIUM: CPT | Performed by: FAMILY MEDICINE

## 2024-11-27 PROCEDURE — 84466 ASSAY OF TRANSFERRIN: CPT | Performed by: PHYSICIAN ASSISTANT

## 2024-11-27 PROCEDURE — 99232 SBSQ HOSP IP/OBS MODERATE 35: CPT | Performed by: FAMILY MEDICINE

## 2024-11-27 PROCEDURE — 82945 GLUCOSE OTHER FLUID: CPT | Performed by: FAMILY MEDICINE

## 2024-11-27 PROCEDURE — 87075 CULTR BACTERIA EXCEPT BLOOD: CPT | Performed by: FAMILY MEDICINE

## 2024-11-27 PROCEDURE — 25810000003 SODIUM CHLORIDE 0.9 % SOLUTION: Performed by: NURSE ANESTHETIST, CERTIFIED REGISTERED

## 2024-11-27 PROCEDURE — 87070 CULTURE OTHR SPECIMN AEROBIC: CPT | Performed by: FAMILY MEDICINE

## 2024-11-27 PROCEDURE — 82247 BILIRUBIN TOTAL: CPT | Performed by: FAMILY MEDICINE

## 2024-11-27 PROCEDURE — 88185 FLOWCYTOMETRY/TC ADD-ON: CPT

## 2024-11-27 PROCEDURE — 25010000002 LIDOCAINE 2% SOLUTION: Performed by: FAMILY MEDICINE

## 2024-11-27 PROCEDURE — 63710000001 INSULIN LISPRO (HUMAN) PER 5 UNITS: Performed by: INTERNAL MEDICINE

## 2024-11-27 PROCEDURE — 25010000002 ONDANSETRON PER 1 MG: Performed by: FAMILY MEDICINE

## 2024-11-27 PROCEDURE — 25010000002 LIDOCAINE PF 2% 2 % SOLUTION: Performed by: NURSE ANESTHETIST, CERTIFIED REGISTERED

## 2024-11-27 PROCEDURE — 76942 ECHO GUIDE FOR BIOPSY: CPT

## 2024-11-27 PROCEDURE — 63710000001 INSULIN GLARGINE PER 5 UNITS: Performed by: INTERNAL MEDICINE

## 2024-11-27 PROCEDURE — 84157 ASSAY OF PROTEIN OTHER: CPT | Performed by: FAMILY MEDICINE

## 2024-11-27 PROCEDURE — 25010000002 MORPHINE PER 10 MG: Performed by: STUDENT IN AN ORGANIZED HEALTH CARE EDUCATION/TRAINING PROGRAM

## 2024-11-27 PROCEDURE — C1729 CATH, DRAINAGE: HCPCS

## 2024-11-27 PROCEDURE — 84100 ASSAY OF PHOSPHORUS: CPT | Performed by: FAMILY MEDICINE

## 2024-11-27 PROCEDURE — 0W3P8ZZ CONTROL BLEEDING IN GASTROINTESTINAL TRACT, VIA NATURAL OR ARTIFICIAL OPENING ENDOSCOPIC: ICD-10-PCS | Performed by: INTERNAL MEDICINE

## 2024-11-27 PROCEDURE — 88108 CYTOPATH CONCENTRATE TECH: CPT | Performed by: FAMILY MEDICINE

## 2024-11-27 RX ORDER — MORPHINE SULFATE 2 MG/ML
2 INJECTION, SOLUTION INTRAMUSCULAR; INTRAVENOUS EVERY 4 HOURS PRN
Status: DISPENSED | OUTPATIENT
Start: 2024-11-27 | End: 2024-12-02

## 2024-11-27 RX ORDER — EPHEDRINE SULFATE 50 MG/ML
INJECTION INTRAVENOUS AS NEEDED
Status: DISCONTINUED | OUTPATIENT
Start: 2024-11-27 | End: 2024-11-27 | Stop reason: SURG

## 2024-11-27 RX ORDER — LIDOCAINE HYDROCHLORIDE 20 MG/ML
20 INJECTION, SOLUTION INFILTRATION; PERINEURAL ONCE
Status: COMPLETED | OUTPATIENT
Start: 2024-11-27 | End: 2024-11-27

## 2024-11-27 RX ORDER — BENZOCAINE/MENTHOL 6 MG-10 MG
1 LOZENGE MUCOUS MEMBRANE EVERY 12 HOURS SCHEDULED
Status: DISCONTINUED | OUTPATIENT
Start: 2024-11-27 | End: 2024-12-09 | Stop reason: HOSPADM

## 2024-11-27 RX ORDER — PHENYLEPHRINE HCL IN 0.9% NACL 1 MG/10 ML
SYRINGE (ML) INTRAVENOUS AS NEEDED
Status: DISCONTINUED | OUTPATIENT
Start: 2024-11-27 | End: 2024-11-27 | Stop reason: SURG

## 2024-11-27 RX ORDER — SODIUM CHLORIDE 9 MG/ML
INJECTION, SOLUTION INTRAVENOUS CONTINUOUS PRN
Status: DISCONTINUED | OUTPATIENT
Start: 2024-11-27 | End: 2024-11-27 | Stop reason: SURG

## 2024-11-27 RX ORDER — PROPOFOL 10 MG/ML
VIAL (ML) INTRAVENOUS AS NEEDED
Status: DISCONTINUED | OUTPATIENT
Start: 2024-11-27 | End: 2024-11-27 | Stop reason: SURG

## 2024-11-27 RX ORDER — LIDOCAINE HYDROCHLORIDE 20 MG/ML
INJECTION, SOLUTION EPIDURAL; INFILTRATION; INTRACAUDAL; PERINEURAL AS NEEDED
Status: DISCONTINUED | OUTPATIENT
Start: 2024-11-27 | End: 2024-11-27 | Stop reason: SURG

## 2024-11-27 RX ADMIN — BUSPIRONE HYDROCHLORIDE 7.5 MG: 7.5 TABLET ORAL at 15:57

## 2024-11-27 RX ADMIN — Medication 10 ML: at 20:15

## 2024-11-27 RX ADMIN — MINERAL OIL, PETROLATUM: 425; 568 OINTMENT OPHTHALMIC at 17:35

## 2024-11-27 RX ADMIN — EPHEDRINE SULFATE 15 MG: 50 INJECTION INTRAVENOUS at 13:51

## 2024-11-27 RX ADMIN — LEVETIRACETAM 500 MG: 500 TABLET, FILM COATED ORAL at 15:57

## 2024-11-27 RX ADMIN — Medication 2000 MG: at 15:59

## 2024-11-27 RX ADMIN — LACTULOSE 30 G: 20 SOLUTION ORAL at 20:14

## 2024-11-27 RX ADMIN — CETIRIZINE HYDROCHLORIDE 5 MG: 10 TABLET, FILM COATED ORAL at 20:13

## 2024-11-27 RX ADMIN — MORPHINE SULFATE 2 MG: 2 INJECTION, SOLUTION INTRAMUSCULAR; INTRAVENOUS at 05:59

## 2024-11-27 RX ADMIN — EPHEDRINE SULFATE 15 MG: 50 INJECTION INTRAVENOUS at 14:03

## 2024-11-27 RX ADMIN — ONDANSETRON 4 MG: 2 INJECTION INTRAMUSCULAR; INTRAVENOUS at 06:30

## 2024-11-27 RX ADMIN — SUCRALFATE 1 G: 1 TABLET ORAL at 20:14

## 2024-11-27 RX ADMIN — SERTRALINE HYDROCHLORIDE 100 MG: 100 TABLET ORAL at 20:14

## 2024-11-27 RX ADMIN — Medication 250 MG: at 20:13

## 2024-11-27 RX ADMIN — SODIUM CHLORIDE: 9 INJECTION, SOLUTION INTRAVENOUS at 13:19

## 2024-11-27 RX ADMIN — BACITRACIN 0.9 G: 500 OINTMENT TOPICAL at 20:14

## 2024-11-27 RX ADMIN — Medication 100 MCG: at 14:09

## 2024-11-27 RX ADMIN — SODIUM BICARBONATE 1 ML: 84 INJECTION, SOLUTION INTRAVENOUS at 09:20

## 2024-11-27 RX ADMIN — FUROSEMIDE 40 MG: 10 INJECTION, SOLUTION INTRAMUSCULAR; INTRAVENOUS at 17:33

## 2024-11-27 RX ADMIN — MINERAL OIL, PETROLATUM: 425; 568 OINTMENT OPHTHALMIC at 03:12

## 2024-11-27 RX ADMIN — MORPHINE SULFATE 2 MG: 2 INJECTION, SOLUTION INTRAMUSCULAR; INTRAVENOUS at 16:00

## 2024-11-27 RX ADMIN — INSULIN LISPRO 6 UNITS: 100 INJECTION, SOLUTION INTRAVENOUS; SUBCUTANEOUS at 17:34

## 2024-11-27 RX ADMIN — CARVEDILOL 25 MG: 25 TABLET, FILM COATED ORAL at 17:33

## 2024-11-27 RX ADMIN — SPIRONOLACTONE 100 MG: 25 TABLET ORAL at 15:57

## 2024-11-27 RX ADMIN — Medication 100 MCG: at 13:44

## 2024-11-27 RX ADMIN — BUSPIRONE HYDROCHLORIDE 7.5 MG: 7.5 TABLET ORAL at 20:13

## 2024-11-27 RX ADMIN — Medication 100 MCG: at 13:49

## 2024-11-27 RX ADMIN — SUCRALFATE 1 G: 1 TABLET ORAL at 17:33

## 2024-11-27 RX ADMIN — INSULIN GLARGINE 10 UNITS: 100 INJECTION, SOLUTION SUBCUTANEOUS at 20:26

## 2024-11-27 RX ADMIN — PANTOPRAZOLE SODIUM 40 MG: 40 INJECTION, POWDER, FOR SOLUTION INTRAVENOUS at 20:14

## 2024-11-27 RX ADMIN — PROPOFOL 100 MG: 10 INJECTION, EMULSION INTRAVENOUS at 13:40

## 2024-11-27 RX ADMIN — OXYCODONE 10 MG: 5 TABLET ORAL at 20:14

## 2024-11-27 RX ADMIN — MORPHINE SULFATE 2 MG: 2 INJECTION, SOLUTION INTRAMUSCULAR; INTRAVENOUS at 21:26

## 2024-11-27 RX ADMIN — LACTULOSE 30 G: 20 SOLUTION ORAL at 17:33

## 2024-11-27 RX ADMIN — PROPOFOL 200 MCG/KG/MIN: 10 INJECTION, EMULSION INTRAVENOUS at 13:40

## 2024-11-27 RX ADMIN — INSULIN LISPRO 4 UNITS: 100 INJECTION, SOLUTION INTRAVENOUS; SUBCUTANEOUS at 20:26

## 2024-11-27 RX ADMIN — LEVETIRACETAM 500 MG: 500 TABLET, FILM COATED ORAL at 20:14

## 2024-11-27 RX ADMIN — LIDOCAINE HYDROCHLORIDE 9 ML: 20 INJECTION, SOLUTION INFILTRATION; PERINEURAL at 09:20

## 2024-11-27 RX ADMIN — LACTULOSE 30 G: 20 SOLUTION ORAL at 15:57

## 2024-11-27 RX ADMIN — HYDROCORTISONE ACETATE 1 APPLICATION: 1 CREAM TOPICAL at 20:16

## 2024-11-27 RX ADMIN — ROPINIROLE HYDROCHLORIDE 1 MG: 1 TABLET, FILM COATED ORAL at 20:13

## 2024-11-27 RX ADMIN — FUROSEMIDE 40 MG: 10 INJECTION, SOLUTION INTRAMUSCULAR; INTRAVENOUS at 05:36

## 2024-11-27 RX ADMIN — LIDOCAINE HYDROCHLORIDE 50 MG: 20 INJECTION, SOLUTION INTRAVENOUS at 13:40

## 2024-11-27 RX ADMIN — RIFAXIMIN 550 MG: 550 TABLET ORAL at 15:58

## 2024-11-27 NOTE — ANESTHESIA PREPROCEDURE EVALUATION
Anesthesia Evaluation     Patient summary reviewed and Nursing notes reviewed   history of anesthetic complications:  PONV  NPO Solid Status: > 8 hours  NPO Liquid Status: > 8 hours           Airway   Mallampati: II  TM distance: >3 FB  Neck ROM: full  Large neck circumference and No difficulty expected  Dental    (+) poor dentition        Pulmonary     breath sounds clear to auscultation  (+) asthma,sleep apnea  Cardiovascular - normal exam  Exercise tolerance: poor (<4 METS)    ECG reviewed  Rhythm: regular  Rate: normal    (+) hypertension, CHF , PVD (LLE > RLE), DVT resolved, hyperlipidemia      Neuro/Psych  (+) CVA, psychiatric history Anxiety and Depression  GI/Hepatic/Renal/Endo    (+) obesity, morbid obesity, GERD well controlled, PUD, GI bleeding , hepatitis, liver disease cirrhosis, renal disease-, diabetes mellitus type 2 well controlled    Musculoskeletal     (+) back pain, chronic pain  Abdominal   (+) obese    Abdomen: soft.   Substance History      OB/GYN          Other   arthritis,     ROS/Med Hx Other: Takes percocet for chronic pain     EKG 04/19/23:   HR 91, SR    Echo 5/20/24 Interpretation Summary  ·  Left ventricular ejection fraction appears to be 66 - 70%.  ·  Left ventricular wall thickness is consistent with mild concentric hypertrophy.  ·  Left ventricular diastolic function is consistent with (grade I) impaired relaxation.  ·  The left atrial cavity is mildly dilated.  ·  Estimated right ventricular systolic pressure from tricuspid regurgitation is normal (<35 mmHg).      Presented to ER for abdominal pain 11/25/24  1 unit PRBC 11/26/24 11/27/24 05:23  Protime: 24.4 (H)  INR: 2.16 (H)  WBC: 3.69  RBC: 2.89 (L)  Hemoglobin: 7.5 (L)  Hematocrit: 24.6 (L)  Platelets: 53 (L)        Phys Exam Other:                     Anesthesia Plan    ASA 4 - emergent     general   total IV anesthesia  (Total IV Anesthesia    Patient understands anesthesia not responsible for dental  damage.  )  intravenous induction     Anesthetic plan, risks, benefits, and alternatives have been provided, discussed and informed consent has been obtained with: patient.  Pre-procedure education provided  Plan discussed with CRNA.        CODE STATUS:    Medical Intervention Limits: No intubation (DNI)  Level Of Support Discussed With: Patient; Next of Kin (If No Surrogate)  Code Status (Patient has no pulse and is not breathing): No CPR (Do Not Attempt to Resuscitate)  Medical Interventions (Patient has pulse or is breathing): Limited Support

## 2024-11-27 NOTE — PLAN OF CARE
Goal Outcome Evaluation:               VSS, pain managed per MAR. Pt ambulatory with staff multiple time in [facility].

## 2024-11-27 NOTE — PROGRESS NOTES
[facility]   Hospitalist Progress Note  Date: 2024  Patient Name: Nic Nicolas  : 1965  MRN: [id number]  Date of admission: 2024      Subjective   Subjective     Chief Complaint: Shortness of breath confusion hematemesis    Summary: Patient is a 59-year-old male past medical history significant for cirrhosis, TBI, diabetes, hypertension, hyperlipidemia, CHF, CKD, asthma, obesity, recurrent hepatic encephalopathy that presents to the emergency department after being seen by his gastroenterologist for confusion and unsteadiness shortness of breath and a week of hematemesis patient evaluated emergency department was encephalopathic with elevated ammonia appeared volume overloaded patient with worsening anemia has been admitted to the hospitalist service GI consulted patient transfused 1 unit of packed red blood cell will likely need endoscopic evaluation started on ceftriaxone for decompensated cirrhosis continuing rifaximin and lactulose continue with Carafate adding IV PPI    Interval Followup: 2024    Alert and conversational.  Reports general aches and pains all over.  Reports chronic pain.  Ammonia 69   2.16  Hgb 7.5, improved after 1 unit PRBCs  Platelets stable in 53K range  INR 2.16   Creatinine stable 1.2-1.3 range   Tolerating IV PPI/ Carafate   /65 range    Objective   Objective     Vitals:   Temp:  [97.9 °F (36.6 °C)-98.8 °F (37.1 °C)] 98.1 °F (36.7 °C)  Heart Rate:  [73-82] 75  Resp:  [18] 18  BP: (113-150)/(47-81) 148/81  Physical Exam   Constitutional: Awake alert no acute distress.  Fidgety? Tardive like lip smacking  Respiratory diminished  Cardiovascular RRR  GI: Abdomen soft mildly tender distended  Extremities edematous    Result Review    Result Review:  I have personally reviewed the pertinent results from the past 24 hours to 2024 12:15 EST and agree with these findings:  [x]  Laboratory   CBC          2024    19:37 2024    05:24  11/27/2024    05:23   CBC   WBC 4.78  3.68  3.69    RBC 2.84  2.53  2.89    Hemoglobin 7.6  6.8  7.5    Hematocrit 24.8  21.8  24.6    MCV 87.3  86.2  85.1    MCH 26.8  26.9  26.0    MCHC 30.6  31.2  30.5    RDW 18.7  18.6  19.7    Platelets 75  71  53      BMP          11/25/2024    18:18 11/26/2024    05:24 11/27/2024    05:23   BMP   BUN 27  25  25    Creatinine 1.34  1.28  1.37    Sodium 138  134  138    Potassium 4.4  3.7  4.2    Chloride 102  101  101    CO2 26.6  28.2  27.9    Calcium 8.6  8.7  8.8      LIVER FUNCTION TESTS:      Lab 11/27/24  0523 11/25/24  1818   TOTAL PROTEIN 5.6* 6.3   ALBUMIN 2.8* 3.1*   GLOBULIN 2.8 3.2   ALT (SGPT) 19 22   AST (SGOT) 43* 45*   BILIRUBIN 1.7* 1.6*   BILIRUBIN DIRECT 0.7*  --    ALK PHOS 123* 142*   LIPASE  --  37       [x]  Microbiology   Microbiology Results (last 10 days)       ** No results found for the last 240 hours. **              [x]  Radiology US Paracentesis    Result Date: 11/27/2024  Impression: Successful ultrasound-guided diagnostic paracentesis without immediate complication.  Electronically Signed: Jimmy Jordan MD  11/27/2024 10:10 AM EST  Workstation ID: [id number]    XR Chest 1 View    Result Date: 11/26/2024  Mild infiltrate or atelectasis at the right base. Electronically Signed: Kye Breaux MD  11/26/2024 5:47 AM EST  Workstation ID: [id number]    CT Abdomen Pelvis With Contrast    Result Date: 11/25/2024  Impression: 1.Small to moderate volume ascites most pronounced in the right upper and lower quadrants. 2.Cirrhosis with splenomegaly. 3.Cholelithiasis without gallbladder inflammatory changes. 4.Fat and fluid-containing umbilical hernia, similar to prior examination. 5.Increasing simple appearing fluid within the left inguinal canal now measuring 5.4 cm in diameter, likely ascites related. 6.Mild generalized body wall edema. Electronically Signed: Emanuel Dewey MD  11/25/2024 8:48 PM EST  Workstation ID: [id number]       []  EKG/Telemetry   No  orders to display       []  Cardiology/Vascular   []  Pathology  [x]  Old records  []  Other:    Assessment & Plan   Assessment / Plan     Assessment:    Hematemesis / Suspect acute upper GI bleed  Acute on chronic anemia  Acute decompensated cirrhosis  ROMO  Acute on chronic diastolic congestive heart failure  Anasarca  Thrombocytopenia secondary to cirrhosis  Hepatic encephalopathy  CKD stage III  History of TBI  Splenomegaly  Diabetes  History of DVT  Nursing home long-term care resident: Helen Burgos  Code Status: DNR    Plan:    Gastroenterology consultation.  EGD planned.  IR consulted.  Paracentesis today 11/27/24.  130 cc.    Continue with IV diuretics Lasix 40 IV BID. Continue Aldactone   Fluids/Na restriction.  Continuing lactulose and rifaximin for hepatic encephalopathy  Starting ceftriaxone for decompensated liver cirrhosis  Monitor H&H closely  Continue basal, bolus, correction insulin  Wound care consultation  A.m. labs  Further treatment contingent upon his hospital course  Long-term prognosis poor.  Palliative care consult.     Discussed plan with RN.    VTE Prophylaxis:  Mechanical VTE prophylaxis orders are present.        CODE STATUS:   Medical Intervention Limits: No intubation (DNI)  Level Of Support Discussed With: Patient; Next of Kin (If No Surrogate)  Code Status (Patient has no pulse and is not breathing): No CPR (Do Not Attempt to Resuscitate)  Medical Interventions (Patient has pulse or is breathing): Limited Support    Attending documentation:  I reviewed the above documentation and independently reviewed and rounded and evaluated the patient and discussed the care plan with SANDOR Goetz PA-C, I agree with his findings and plan as documented, what I have added to the care plan and modified is as follows in my documentation and my medical decision making; 59-year-old male with history of TBI, sleep apnea, hypertension, history of DVT, diabetes, depression, liver cirrhosis with  splenomegaly, anxiety, history of gastric ulcers with bleed, chronic diastolic CHF, hospitalized on 11/25/2024 with chief complaint of unsteadiness, confusion, shortness of breath, found to have an ammonia level of 69, acute anemia requiring blood transfusion, creatinine 1.28, sodium 134, coagulopathy due to liver disease, PRBC ordered for transfusion, IR consulted for paracentesis of ascites fluid, reported hematemesis for a week, seen in GI clinic, sent to the emergency room, status post EGD, found to have portal hypertensive gastropathy with oozing, treated with argon plasma coagulation.  Interval follow-up: Patient seen and examined this morning, no acute distress, no acute major overnight events, still confused as of this morning sitting at the edge of the bed awaiting EGD, plans to go down for paracentesis as well.  [place] MOST form signed.  Hemoglobin this morning 7.5, INR 2.16, creatinine 1.37, BUN 25, potassium 4.2.  Review of systems obtained, all systems reviewed and negative except weakness and fatigue, abdominal discomfort, intermittent confusion.  On physical exam elderly appearing male confused, no acute distress, edematous, regular rate rhythm, diminished breath sounds, soft nontender abdomen, with distention, pallor, lower extreme edema.  Alert and oriented to self.  Assessment as above, volume overloaded, acute anemia, concern for hematemesis prior to hospitalization, on EGD found to have acute oozing, likely etiology of acute blood loss anemia, history of GAVE, long-term nursing home resident, with other medical history as listed above with hyperammonemia and hepatic encephalopathy, plan, continue lactulose to 30 g 4 times a day, continue Protonix to IV 40 mg twice daily, continue rifaximin 550 mg twice a day, continue Carafate, spironolactone, Lasix 40 mg IV twice daily, strict I's and O's, daily weights, continue ceftriaxone empirically for intra-abdominal infection a patient with liver cirrhosis  concern for GI bleed, may need additional blood transfusion if hemoglobin dwindles down,  GI consulted discussed at length with Dr. Roblero, EGD appreciated, insulin sliding cell coverage, resume diet after scope, a.m. labs, full code, DVT prophylaxis with SCDs, clinical course dictate further management of this critically ill complicated patient with concern for hepatic encephalopathy in the setting of hematemesis and GI bleed requiring consultation with specialty services and a number of changes to his medication regimen requiring detailed analysis and thought.  More than 85 % of the time of this patient's encounter was performed by me, this included face-to-face time, planning and coordinating, medical decision making and critical thinking personally done by me.    Electronically signed by Bandar Burch MD, 11/27/2024, 15:33 EST.    Portions of this documentation were transcribed electronically from a voice recognition software.  I confirm all data accurately represents the service(s) I performed at today's visit.

## 2024-11-27 NOTE — PLAN OF CARE
Goal Outcome Evaluation:      Pt is alert and oriented x4. On room air. Ambulated outside the room with walker and assistance. Medicated per MAR. Complaints of redness on his eyes. Contacted the provider for meds. NPO at midnight for possible EGD today. Skin care done as ordered. Pt refused his bed alarm. Blood glucose checked and was given supplemental insulin. Call light in reach. No new complaints at this time. Continue plan of care.

## 2024-11-27 NOTE — ANESTHESIA POSTPROCEDURE EVALUATION
Patient: Nic Nicolas    Procedure Summary       Date: 11/27/24 Room / Location: [facility] ENDOSCOPY 4 / [facility] ENDOSCOPY    Anesthesia Start: 1307 Anesthesia Stop: 1420    Procedure: ESOPHAGOGASTRODUODENOSCOPY with APC Diagnosis:       Gastrointestinal hemorrhage associated with peptic ulcer      (Gastrointestinal hemorrhage associated with peptic ulcer [K27.4])    Surgeons: Karlos Roblero MD Provider: Rubia Wu CRNA    Anesthesia Type: general ASA Status: 4 - Emergent            Anesthesia Type: general    Vitals  Vitals Value Taken Time   /54 11/27/24 1439   Temp 36.3 °C (97.4 °F) 11/27/24 1418   Pulse 85 11/27/24 1441   Resp 15 11/27/24 1418   SpO2 97 % 11/27/24 1441   Vitals shown include unfiled device data.        Post Anesthesia Care and Evaluation    Post-procedure mental status: acceptable.  Pain management: satisfactory to patient    Airway patency: patent  Anesthetic complications: No anesthetic complications    Cardiovascular status: acceptable  Respiratory status: acceptable    Comments: Per chart review

## 2024-11-27 NOTE — NURSING NOTE
Educated on and completed [place] MOST with patient, patients sister, and provider. Placed in chart.     At this time- palliative care will sign off. Patient pursuing placement. If new needs arise, please place new palliative care consult order.    Lilia LUA, RN, PN  Palliative Care

## 2024-11-28 LAB
ALBUMIN SERPL-MCNC: 3.1 G/DL (ref 3.5–5.2)
ALP SERPL-CCNC: 128 U/L (ref 39–117)
ALT SERPL W P-5'-P-CCNC: 21 U/L (ref 1–41)
AMMONIA BLD-SCNC: 74 UMOL/L (ref 16–60)
ANION GAP SERPL CALCULATED.3IONS-SCNC: 7.9 MMOL/L (ref 5–15)
AST SERPL-CCNC: 50 U/L (ref 1–40)
BASOPHILS # BLD AUTO: 0.06 10*3/MM3 (ref 0–0.2)
BASOPHILS NFR BLD AUTO: 1.4 % (ref 0–1.5)
BILIRUB CONJ SERPL-MCNC: 0.6 MG/DL (ref 0–0.3)
BILIRUB INDIRECT SERPL-MCNC: 0.7 MG/DL
BILIRUB SERPL-MCNC: 1.3 MG/DL (ref 0–1.2)
BUN SERPL-MCNC: 24 MG/DL (ref 6–20)
BUN/CREAT SERPL: 17.1 (ref 7–25)
CALCIUM SPEC-SCNC: 8.6 MG/DL (ref 8.6–10.5)
CHLORIDE SERPL-SCNC: 101 MMOL/L (ref 98–107)
CO2 SERPL-SCNC: 25.1 MMOL/L (ref 22–29)
CREAT SERPL-MCNC: 1.4 MG/DL (ref 0.76–1.27)
DEPRECATED RDW RBC AUTO: 60.5 FL (ref 37–54)
EGFRCR SERPLBLD CKD-EPI 2021: 58.3 ML/MIN/1.73
EOSINOPHIL # BLD AUTO: 0.16 10*3/MM3 (ref 0–0.4)
EOSINOPHIL NFR BLD AUTO: 3.6 % (ref 0.3–6.2)
ERYTHROCYTE [DISTWIDTH] IN BLOOD BY AUTOMATED COUNT: 19.3 % (ref 12.3–15.4)
GLUCOSE BLDC GLUCOMTR-MCNC: 156 MG/DL (ref 70–99)
GLUCOSE BLDC GLUCOMTR-MCNC: 175 MG/DL (ref 70–99)
GLUCOSE BLDC GLUCOMTR-MCNC: 220 MG/DL (ref 70–99)
GLUCOSE BLDC GLUCOMTR-MCNC: 254 MG/DL (ref 70–99)
GLUCOSE SERPL-MCNC: 119 MG/DL (ref 65–99)
HCT VFR BLD AUTO: 26.8 % (ref 37.5–51)
HGB BLD-MCNC: 8.2 G/DL (ref 13–17.7)
IMM GRANULOCYTES # BLD AUTO: 0.02 10*3/MM3 (ref 0–0.05)
IMM GRANULOCYTES NFR BLD AUTO: 0.5 % (ref 0–0.5)
LYMPHOCYTES # BLD AUTO: 1.04 10*3/MM3 (ref 0.7–3.1)
LYMPHOCYTES NFR BLD AUTO: 23.4 % (ref 19.6–45.3)
MAGNESIUM SERPL-MCNC: 1.5 MG/DL (ref 1.6–2.6)
MCH RBC QN AUTO: 26 PG (ref 26.6–33)
MCHC RBC AUTO-ENTMCNC: 30.6 G/DL (ref 31.5–35.7)
MCV RBC AUTO: 85.1 FL (ref 79–97)
MONOCYTES # BLD AUTO: 0.5 10*3/MM3 (ref 0.1–0.9)
MONOCYTES NFR BLD AUTO: 11.3 % (ref 5–12)
NEUTROPHILS NFR BLD AUTO: 2.66 10*3/MM3 (ref 1.7–7)
NEUTROPHILS NFR BLD AUTO: 59.8 % (ref 42.7–76)
NRBC BLD AUTO-RTO: 0 /100 WBC (ref 0–0.2)
PHOSPHATE SERPL-MCNC: 3.8 MG/DL (ref 2.5–4.5)
PLATELET # BLD AUTO: 66 10*3/MM3 (ref 140–450)
PMV BLD AUTO: 10.2 FL (ref 6–12)
POTASSIUM SERPL-SCNC: 4 MMOL/L (ref 3.5–5.2)
PROT SERPL-MCNC: 6.2 G/DL (ref 6–8.5)
RBC # BLD AUTO: 3.15 10*6/MM3 (ref 4.14–5.8)
SODIUM SERPL-SCNC: 134 MMOL/L (ref 136–145)
WBC NRBC COR # BLD AUTO: 4.44 10*3/MM3 (ref 3.4–10.8)

## 2024-11-28 PROCEDURE — 80048 BASIC METABOLIC PNL TOTAL CA: CPT | Performed by: INTERNAL MEDICINE

## 2024-11-28 PROCEDURE — 63710000001 INSULIN GLARGINE PER 5 UNITS: Performed by: INTERNAL MEDICINE

## 2024-11-28 PROCEDURE — 82948 REAGENT STRIP/BLOOD GLUCOSE: CPT | Performed by: INTERNAL MEDICINE

## 2024-11-28 PROCEDURE — 94799 UNLISTED PULMONARY SVC/PX: CPT

## 2024-11-28 PROCEDURE — 25010000002 CEFTRIAXONE PER 250 MG: Performed by: INTERNAL MEDICINE

## 2024-11-28 PROCEDURE — 82948 REAGENT STRIP/BLOOD GLUCOSE: CPT

## 2024-11-28 PROCEDURE — 83735 ASSAY OF MAGNESIUM: CPT | Performed by: INTERNAL MEDICINE

## 2024-11-28 PROCEDURE — 25010000002 MAGNESIUM SULFATE 4 GM/100ML SOLUTION: Performed by: FAMILY MEDICINE

## 2024-11-28 PROCEDURE — 99232 SBSQ HOSP IP/OBS MODERATE 35: CPT | Performed by: FAMILY MEDICINE

## 2024-11-28 PROCEDURE — P9047 ALBUMIN (HUMAN), 25%, 50ML: HCPCS | Performed by: STUDENT IN AN ORGANIZED HEALTH CARE EDUCATION/TRAINING PROGRAM

## 2024-11-28 PROCEDURE — 85025 COMPLETE CBC W/AUTO DIFF WBC: CPT | Performed by: INTERNAL MEDICINE

## 2024-11-28 PROCEDURE — 84100 ASSAY OF PHOSPHORUS: CPT | Performed by: INTERNAL MEDICINE

## 2024-11-28 PROCEDURE — 25010000002 MORPHINE PER 10 MG: Performed by: INTERNAL MEDICINE

## 2024-11-28 PROCEDURE — 94761 N-INVAS EAR/PLS OXIMETRY MLT: CPT

## 2024-11-28 PROCEDURE — 80076 HEPATIC FUNCTION PANEL: CPT | Performed by: INTERNAL MEDICINE

## 2024-11-28 PROCEDURE — 63710000001 INSULIN LISPRO (HUMAN) PER 5 UNITS: Performed by: INTERNAL MEDICINE

## 2024-11-28 PROCEDURE — 25010000002 FUROSEMIDE PER 20 MG: Performed by: INTERNAL MEDICINE

## 2024-11-28 PROCEDURE — 82140 ASSAY OF AMMONIA: CPT | Performed by: PHYSICIAN ASSISTANT

## 2024-11-28 PROCEDURE — 25010000002 ALBUMIN HUMAN 25% PER 50 ML: Performed by: STUDENT IN AN ORGANIZED HEALTH CARE EDUCATION/TRAINING PROGRAM

## 2024-11-28 RX ORDER — MAGNESIUM SULFATE HEPTAHYDRATE 40 MG/ML
4 INJECTION, SOLUTION INTRAVENOUS ONCE
Status: COMPLETED | OUTPATIENT
Start: 2024-11-28 | End: 2024-11-28

## 2024-11-28 RX ORDER — ALBUMIN (HUMAN) 12.5 G/50ML
25 SOLUTION INTRAVENOUS ONCE
Status: COMPLETED | OUTPATIENT
Start: 2024-11-28 | End: 2024-11-28

## 2024-11-28 RX ADMIN — FLUTICASONE PROPIONATE 1 SPRAY: 50 SPRAY, METERED NASAL at 09:56

## 2024-11-28 RX ADMIN — INSULIN LISPRO 2 UNITS: 100 INJECTION, SOLUTION INTRAVENOUS; SUBCUTANEOUS at 09:54

## 2024-11-28 RX ADMIN — BUSPIRONE HYDROCHLORIDE 7.5 MG: 7.5 TABLET ORAL at 20:42

## 2024-11-28 RX ADMIN — RIFAXIMIN 550 MG: 550 TABLET ORAL at 15:29

## 2024-11-28 RX ADMIN — FUROSEMIDE 40 MG: 10 INJECTION, SOLUTION INTRAMUSCULAR; INTRAVENOUS at 05:14

## 2024-11-28 RX ADMIN — MINERAL OIL, PETROLATUM: 425; 568 OINTMENT OPHTHALMIC at 05:17

## 2024-11-28 RX ADMIN — INSULIN LISPRO 2 UNITS: 100 INJECTION, SOLUTION INTRAVENOUS; SUBCUTANEOUS at 12:36

## 2024-11-28 RX ADMIN — HYDROCORTISONE ACETATE 1 APPLICATION: 1 CREAM TOPICAL at 09:56

## 2024-11-28 RX ADMIN — LEVETIRACETAM 500 MG: 500 TABLET, FILM COATED ORAL at 20:42

## 2024-11-28 RX ADMIN — SPIRONOLACTONE 100 MG: 25 TABLET ORAL at 09:53

## 2024-11-28 RX ADMIN — Medication 10 ML: at 20:43

## 2024-11-28 RX ADMIN — HYDROXYZINE HYDROCHLORIDE 25 MG: 25 TABLET ORAL at 09:53

## 2024-11-28 RX ADMIN — LEVETIRACETAM 500 MG: 500 TABLET, FILM COATED ORAL at 09:53

## 2024-11-28 RX ADMIN — CETIRIZINE HYDROCHLORIDE 5 MG: 10 TABLET, FILM COATED ORAL at 20:42

## 2024-11-28 RX ADMIN — LACTULOSE 30 G: 20 SOLUTION ORAL at 20:41

## 2024-11-28 RX ADMIN — INSULIN GLARGINE 10 UNITS: 100 INJECTION, SOLUTION SUBCUTANEOUS at 20:42

## 2024-11-28 RX ADMIN — OXYCODONE 10 MG: 5 TABLET ORAL at 12:44

## 2024-11-28 RX ADMIN — HYDROCORTISONE ACETATE 1 APPLICATION: 1 CREAM TOPICAL at 20:43

## 2024-11-28 RX ADMIN — PANTOPRAZOLE SODIUM 40 MG: 40 INJECTION, POWDER, FOR SOLUTION INTRAVENOUS at 20:41

## 2024-11-28 RX ADMIN — SUCRALFATE 1 G: 1 TABLET ORAL at 12:37

## 2024-11-28 RX ADMIN — BUSPIRONE HYDROCHLORIDE 7.5 MG: 7.5 TABLET ORAL at 15:29

## 2024-11-28 RX ADMIN — SUCRALFATE 1 G: 1 TABLET ORAL at 09:53

## 2024-11-28 RX ADMIN — ALBUMIN (HUMAN) 25 G: 0.25 INJECTION, SOLUTION INTRAVENOUS at 21:26

## 2024-11-28 RX ADMIN — FUROSEMIDE 40 MG: 10 INJECTION, SOLUTION INTRAMUSCULAR; INTRAVENOUS at 17:49

## 2024-11-28 RX ADMIN — INSULIN LISPRO 4 UNITS: 100 INJECTION, SOLUTION INTRAVENOUS; SUBCUTANEOUS at 17:48

## 2024-11-28 RX ADMIN — MORPHINE SULFATE 2 MG: 2 INJECTION, SOLUTION INTRAMUSCULAR; INTRAVENOUS at 22:34

## 2024-11-28 RX ADMIN — BUSPIRONE HYDROCHLORIDE 7.5 MG: 7.5 TABLET ORAL at 09:53

## 2024-11-28 RX ADMIN — LACTULOSE 30 G: 20 SOLUTION ORAL at 09:54

## 2024-11-28 RX ADMIN — RIFAXIMIN 550 MG: 550 TABLET ORAL at 02:25

## 2024-11-28 RX ADMIN — OXYCODONE 10 MG: 5 TABLET ORAL at 05:14

## 2024-11-28 RX ADMIN — INSULIN LISPRO 6 UNITS: 100 INJECTION, SOLUTION INTRAVENOUS; SUBCUTANEOUS at 20:41

## 2024-11-28 RX ADMIN — LACTULOSE 30 G: 20 SOLUTION ORAL at 17:49

## 2024-11-28 RX ADMIN — SUCRALFATE 1 G: 1 TABLET ORAL at 20:42

## 2024-11-28 RX ADMIN — PANTOPRAZOLE SODIUM 40 MG: 40 INJECTION, POWDER, FOR SOLUTION INTRAVENOUS at 09:53

## 2024-11-28 RX ADMIN — MORPHINE SULFATE 2 MG: 2 INJECTION, SOLUTION INTRAMUSCULAR; INTRAVENOUS at 01:37

## 2024-11-28 RX ADMIN — BACITRACIN 0.9 G: 500 OINTMENT TOPICAL at 15:29

## 2024-11-28 RX ADMIN — SERTRALINE HYDROCHLORIDE 100 MG: 100 TABLET ORAL at 20:42

## 2024-11-28 RX ADMIN — Medication 10 ML: at 09:54

## 2024-11-28 RX ADMIN — Medication 2000 MG: at 12:37

## 2024-11-28 RX ADMIN — Medication 250 MG: at 09:53

## 2024-11-28 RX ADMIN — BACITRACIN 0.9 G: 500 OINTMENT TOPICAL at 20:42

## 2024-11-28 RX ADMIN — BACITRACIN 0.9 G: 500 OINTMENT TOPICAL at 09:53

## 2024-11-28 RX ADMIN — HYDROXYZINE HYDROCHLORIDE 25 MG: 25 TABLET ORAL at 01:37

## 2024-11-28 RX ADMIN — Medication 250 MG: at 20:42

## 2024-11-28 RX ADMIN — MORPHINE SULFATE 2 MG: 2 INJECTION, SOLUTION INTRAMUSCULAR; INTRAVENOUS at 17:49

## 2024-11-28 RX ADMIN — MAGNESIUM SULFATE IN WATER FOR 4 G: 40 INJECTION INTRAVENOUS at 09:54

## 2024-11-28 RX ADMIN — ROPINIROLE HYDROCHLORIDE 1 MG: 1 TABLET, FILM COATED ORAL at 20:42

## 2024-11-28 RX ADMIN — CARVEDILOL 25 MG: 25 TABLET, FILM COATED ORAL at 09:53

## 2024-11-28 RX ADMIN — SUCRALFATE 1 G: 1 TABLET ORAL at 17:49

## 2024-11-28 NOTE — PLAN OF CARE
Goal Outcome Evaluation:      Patient complained of pain and medicated per orders, vitals stable, up walking around unit ad myrtle.

## 2024-11-28 NOTE — PROGRESS NOTES
[facility]   Hospitalist Progress Note  Date: 2024  Patient Name: Nic Nicolas  : 1965  MRN: [id number]  Date of admission: 2024      Subjective   Subjective     Chief Complaint: Shortness of breath confusion hematemesis    Summary: Patient is a 59-year-old male past medical history significant for cirrhosis, TBI, diabetes, hypertension, hyperlipidemia, CHF, CKD, asthma, obesity, recurrent hepatic encephalopathy that presents to the emergency department after being seen by his gastroenterologist for confusion and unsteadiness shortness of breath and a week of hematemesis patient evaluated emergency department was encephalopathic with elevated ammonia appeared volume overloaded patient with worsening anemia has been admitted to the hospitalist service GI consulted patient transfused 1 unit of packed red blood cell will likely need endoscopic evaluation started on ceftriaxone for decompensated cirrhosis continuing rifaximin and lactulose continue with Carafate adding IV PPI    Interval Followup: 2024    Reports chronic pain all over.  Hgb up to 8.2, improved after 1 unit PRBCs  Platelets stable in 66K range  INR 2.16   Creatinine stable 1.4 range   Tolerating IV PPI/ Carafate   /54  -  116/52.     Disposition once stable .. Return to [facility], where he is long term resident    Objective   Objective     Vitals:   Temp:  [97.3 °F (36.3 °C)-98.2 °F (36.8 °C)] 97.5 °F (36.4 °C)  Heart Rate:  [] 66  Resp:  [15-18] 18  BP: ()/(43-87) 115/51  FiO2 (%):  [36 %] 36 %  Physical Exam   Constitutional: Awake alert no acute distress.  Fidgety?   Respiratory diminished  Cardiovascular RRR  GI: Abdomen obese, soft mildly tender distended  Extremities scattered bruising    Result Review    Result Review:  I have personally reviewed the pertinent results from the past 24 hours to 2024 09:09 EST and agree with these findings:  [x]  Laboratory   CBC          2024     05:24 11/27/2024    05:23 11/28/2024    05:22   CBC   WBC 3.68  3.69  4.44    RBC 2.53  2.89  3.15    Hemoglobin 6.8  7.5  8.2    Hematocrit 21.8  24.6  26.8    MCV 86.2  85.1  85.1    MCH 26.9  26.0  26.0    MCHC 31.2  30.5  30.6    RDW 18.6  19.7  19.3    Platelets 71  53  66      BMP          11/26/2024    05:24 11/27/2024    05:23 11/28/2024    05:22   BMP   BUN 25  25  24    Creatinine 1.28  1.37  1.40    Sodium 134  138  134    Potassium 3.7  4.2  4.0    Chloride 101  101  101    CO2 28.2  27.9  25.1    Calcium 8.7  8.8  8.6      LIVER FUNCTION TESTS:      Lab 11/28/24  0522 11/27/24  0523 11/25/24  1818   TOTAL PROTEIN 6.2 5.6* 6.3   ALBUMIN 3.1* 2.8* 3.1*   GLOBULIN  --  2.8 3.2   ALT (SGPT) 21 19 22   AST (SGOT) 50* 43* 45*   BILIRUBIN 1.3* 1.7* 1.6*   INDIRECT BILIRUBIN 0.7  --   --    BILIRUBIN DIRECT 0.6* 0.7*  --    ALK PHOS 128* 123* 142*   LIPASE  --   --  37       [x]  Microbiology   Microbiology Results (last 10 days)       Procedure Component Value - Date/Time    Body Fluid Culture - Body Fluid, Peritoneum [[id number]] Collected: 11/27/24 0957    Lab Status: Preliminary result Specimen: Body Fluid from Peritoneum Updated: 11/28/24 0803     Body Fluid Culture No growth     Gram Stain Moderate (3+) WBCs seen      No organisms seen              [x]  Radiology US Paracentesis    Result Date: 11/27/2024  Impression: Successful ultrasound-guided diagnostic paracentesis without immediate complication.  Electronically Signed: Jimmy Jordan MD  11/27/2024 10:10 AM EST  Workstation ID: [id number]    XR Chest 1 View    Result Date: 11/26/2024  Mild infiltrate or atelectasis at the right base. Electronically Signed: Kye Breaux MD  11/26/2024 5:47 AM EST  Workstation ID: [id number]    CT Abdomen Pelvis With Contrast    Result Date: 11/25/2024  Impression: 1.Small to moderate volume ascites most pronounced in the right upper and lower quadrants. 2.Cirrhosis with splenomegaly. 3.Cholelithiasis without  gallbladder inflammatory changes. 4.Fat and fluid-containing umbilical hernia, similar to prior examination. 5.Increasing simple appearing fluid within the left inguinal canal now measuring 5.4 cm in diameter, likely ascites related. 6.Mild generalized body wall edema. Electronically Signed: Emanuel Dewey MD  11/25/2024 8:48 PM EST  Workstation ID: [id number]       []  EKG/Telemetry   No orders to display       []  Cardiology/Vascular   []  Pathology  [x]  Old records  []  Other:    Assessment & Plan   Assessment / Plan     Assessment:    Hematemesis / Suspect acute upper GI bleed  Acute on chronic anemia  Acute decompensated cirrhosis  ROMO  Acute on chronic diastolic congestive heart failure  Anasarca  Thrombocytopenia secondary to cirrhosis  Hepatic encephalopathy  CKD stage III  History of TBI  Splenomegaly  Diabetes  History of DVT  Nursing home long-term care resident: Helen Burgos  Bleeding portal hypertensive gastropathy in the setting of thrombocytopenia/elevated INR  Code Status: DNR    Plan:    Replace mag.  Check ammonia.  Trend Hgb.  Gastroenterology consultation.  EGD 11/27/24.  Bleeding treated with argon plasma coagulopathy.  IR consulted.  Paracentesis 11/27/24.  130 cc.    Continue with IV diuretics Lasix 40 IV BID. Continue Aldactone   Fluids/Na restriction.  Continuing lactulose and rifaximin for hepatic encephalopathy  Starting ceftriaxone for decompensated liver cirrhosis  Monitor H&H closely  Continue basal, bolus, correction insulin  Wound care consultation  Further treatment contingent upon his hospital course  Long-term prognosis poor.  Palliative care consult.     Discussed plan with RN.    VTE Prophylaxis:  Mechanical VTE prophylaxis orders are present.        CODE STATUS:   Medical Intervention Limits: No intubation (DNI)  Level Of Support Discussed With: Patient; Next of Kin (If No Surrogate)  Code Status (Patient has no pulse and is not breathing): No CPR (Do Not Attempt to  Resuscitate)  Medical Interventions (Patient has pulse or is breathing): Limited Support         Attending documentation:  I reviewed the above documentation and independently reviewed and rounded and evaluated the patient and discussed the care plan with SANDOR Goetz PA-C, I agree with his findings and plan as documented, what I have added to the care plan and modified is as follows in my documentation and my medical decision making; 59-year-old male with history of TBI, sleep apnea, hypertension, history of DVT, diabetes, depression, liver cirrhosis with splenomegaly, anxiety, history of gastric ulcers with bleed, chronic diastolic CHF, hospitalized on 11/25/2024 with chief complaint of unsteadiness, confusion, shortness of breath, found to have an ammonia level of 69, acute anemia requiring blood transfusion, creatinine 1.28, sodium 134, coagulopathy due to liver disease, PRBC ordered for transfusion, IR consulted for paracentesis of ascites fluid, reported hematemesis for a week, seen in GI clinic, sent to the emergency room, status post EGD, found to have portal hypertensive gastropathy with oozing, treated with argon plasma coagulation.  Interval follow-up: Patient seen and examined this morning, no acute distress, no acute major overnight events, less confused, declining some nursing interventions.  Ammonia still elevated, white blood cell count 4000, ammonia 74, hemoglobin 8.2, creatinine 1.4, BUN 24, potassium 4.0.  Remains weak and fatigued.  Review of systems obtained, all systems reviewed and negative except weakness and fatigue, intermittent confusion.  On physical exam elderly appearing male with subtle confusion, sitting at the edge of the bed, no acute distress, edematous, regular rate rhythm, diminished breath sounds, soft nontender abdomen, with distention, pallor, lower extremity edema.  Alert and oriented to self and surroundings.  Assessment as above, volume overloaded, acute anemia, concern for  hematemesis prior to hospitalization, on EGD found to have acute oozing, likely etiology of acute blood loss anemia, history of GAVE, long-term nursing home resident, with other medical history as listed above with hyperammonemia and hepatic encephalopathy, status post EGD and argon plasma coagulation of oozing sites that were bleeding.  Plan, continue lactulose to 30 g 4 times a day, continue Protonix to IV 40 mg twice daily for 72 hours, continue rifaximin 550 mg twice a day, continue Carafate, spironolactone, Lasix 40 mg IV twice daily, strict I's and O's, daily weights, continue ceftriaxone empirically for intra-abdominal infection a patient with liver cirrhosis with GI bleed, may need additional blood transfusion if hemoglobin dwindles down,  GI consulted discussed at length with Dr. Roblero, EGD appreciated, insulin sliding scale coverage, out of bed to chair, PT/OT, a.m. labs, full code, DVT prophylaxis with SCDs, clinical course dictate further management, More than 70 % of the time of this patient's encounter was performed by me, this included face-to-face time, planning and coordinating, medical decision making and critical thinking personally done by me.      Electronically signed by Bandar Burch MD, 11/28/2024, 11:39 EST.    Portions of this documentation were transcribed electronically from a voice recognition software.  I confirm all data accurately represents the service(s) I performed at today's visit.

## 2024-11-28 NOTE — PLAN OF CARE
Goal Outcome Evaluation:   Pt is a&o x4. Pt been in and out of the room to walk. Wound care performed this shift. Blood glucose monitored and given supplemental insulin. Refused his bed alarm. Medicated per MAR. Vital signs are stable. Call light in reach. Pt frequently asking for pain meds. Medicated prn meds. Assessed pt wound from paracentisis yesterday. No new issues at this time. Continue plan of care.

## 2024-11-29 ENCOUNTER — APPOINTMENT (OUTPATIENT)
Dept: CT IMAGING | Facility: HOSPITAL | Age: 59
DRG: 441 | End: 2024-11-29
Payer: MEDICAID

## 2024-11-29 LAB
ALBUMIN SERPL-MCNC: 2.9 G/DL (ref 3.5–5.2)
ALBUMIN SERPL-MCNC: 2.9 G/DL (ref 3.5–5.2)
ALBUMIN/GLOB SERPL: 1.2 G/DL
ALP SERPL-CCNC: 111 U/L (ref 39–117)
ALP SERPL-CCNC: 119 U/L (ref 39–117)
ALT SERPL W P-5'-P-CCNC: 17 U/L (ref 1–41)
ALT SERPL W P-5'-P-CCNC: 17 U/L (ref 1–41)
ANION GAP SERPL CALCULATED.3IONS-SCNC: 6.5 MMOL/L (ref 5–15)
ANION GAP SERPL CALCULATED.3IONS-SCNC: 8.6 MMOL/L (ref 5–15)
ANISOCYTOSIS BLD QL: NORMAL
AST SERPL-CCNC: 39 U/L (ref 1–40)
AST SERPL-CCNC: 39 U/L (ref 1–40)
BASOPHILS # BLD AUTO: 0.01 10*3/MM3 (ref 0–0.2)
BASOPHILS # BLD AUTO: 0.01 10*3/MM3 (ref 0–0.2)
BASOPHILS NFR BLD AUTO: 0.5 % (ref 0–1.5)
BASOPHILS NFR BLD AUTO: 0.5 % (ref 0–1.5)
BILIRUB CONJ SERPL-MCNC: 0.4 MG/DL (ref 0–0.3)
BILIRUB INDIRECT SERPL-MCNC: 0.4 MG/DL
BILIRUB SERPL-MCNC: 0.8 MG/DL (ref 0–1.2)
BILIRUB SERPL-MCNC: 0.8 MG/DL (ref 0–1.2)
BUN SERPL-MCNC: 25 MG/DL (ref 6–20)
BUN SERPL-MCNC: 25 MG/DL (ref 6–20)
BUN/CREAT SERPL: 16.6 (ref 7–25)
BUN/CREAT SERPL: 17.7 (ref 7–25)
CALCIUM SPEC-SCNC: 8.2 MG/DL (ref 8.6–10.5)
CALCIUM SPEC-SCNC: 8.3 MG/DL (ref 8.6–10.5)
CHLORIDE SERPL-SCNC: 101 MMOL/L (ref 98–107)
CHLORIDE SERPL-SCNC: 101 MMOL/L (ref 98–107)
CO2 SERPL-SCNC: 26.4 MMOL/L (ref 22–29)
CO2 SERPL-SCNC: 27.5 MMOL/L (ref 22–29)
CREAT SERPL-MCNC: 1.41 MG/DL (ref 0.76–1.27)
CREAT SERPL-MCNC: 1.51 MG/DL (ref 0.76–1.27)
DACRYOCYTES BLD QL SMEAR: NORMAL
DEPRECATED RDW RBC AUTO: 57.6 FL (ref 37–54)
DEPRECATED RDW RBC AUTO: 57.8 FL (ref 37–54)
EGFRCR SERPLBLD CKD-EPI 2021: 53.2 ML/MIN/1.73
EGFRCR SERPLBLD CKD-EPI 2021: 57.8 ML/MIN/1.73
EOSINOPHIL # BLD AUTO: 0.06 10*3/MM3 (ref 0–0.4)
EOSINOPHIL # BLD AUTO: 0.06 10*3/MM3 (ref 0–0.4)
EOSINOPHIL NFR BLD AUTO: 3 % (ref 0.3–6.2)
EOSINOPHIL NFR BLD AUTO: 3 % (ref 0.3–6.2)
ERYTHROCYTE [DISTWIDTH] IN BLOOD BY AUTOMATED COUNT: 18.3 % (ref 12.3–15.4)
ERYTHROCYTE [DISTWIDTH] IN BLOOD BY AUTOMATED COUNT: 18.7 % (ref 12.3–15.4)
GLOBULIN UR ELPH-MCNC: 2.5 GM/DL
GLUCOSE BLDC GLUCOMTR-MCNC: 180 MG/DL (ref 70–99)
GLUCOSE BLDC GLUCOMTR-MCNC: 191 MG/DL (ref 70–99)
GLUCOSE BLDC GLUCOMTR-MCNC: 210 MG/DL (ref 70–99)
GLUCOSE BLDC GLUCOMTR-MCNC: 223 MG/DL (ref 70–99)
GLUCOSE SERPL-MCNC: 211 MG/DL (ref 65–99)
GLUCOSE SERPL-MCNC: 235 MG/DL (ref 65–99)
HCT VFR BLD AUTO: 22.4 % (ref 37.5–51)
HCT VFR BLD AUTO: 24 % (ref 37.5–51)
HGB BLD-MCNC: 7 G/DL (ref 13–17.7)
HGB BLD-MCNC: 7.3 G/DL (ref 13–17.7)
HYPOCHROMIA BLD QL: NORMAL
IMM GRANULOCYTES # BLD AUTO: 0.01 10*3/MM3 (ref 0–0.05)
IMM GRANULOCYTES # BLD AUTO: 0.01 10*3/MM3 (ref 0–0.05)
IMM GRANULOCYTES NFR BLD AUTO: 0.5 % (ref 0–0.5)
IMM GRANULOCYTES NFR BLD AUTO: 0.5 % (ref 0–0.5)
INR PPP: 2.06 (ref 0.86–1.15)
LARGE PLATELETS: NORMAL
LYMPHOCYTES # BLD AUTO: 0.47 10*3/MM3 (ref 0.7–3.1)
LYMPHOCYTES # BLD AUTO: 0.49 10*3/MM3 (ref 0.7–3.1)
LYMPHOCYTES NFR BLD AUTO: 23.7 % (ref 19.6–45.3)
LYMPHOCYTES NFR BLD AUTO: 24.1 % (ref 19.6–45.3)
Lab: NORMAL
MACROCYTES BLD QL SMEAR: NORMAL
MAGNESIUM SERPL-MCNC: 2 MG/DL (ref 1.6–2.6)
MCH RBC QN AUTO: 26.2 PG (ref 26.6–33)
MCH RBC QN AUTO: 26.3 PG (ref 26.6–33)
MCHC RBC AUTO-ENTMCNC: 30.4 G/DL (ref 31.5–35.7)
MCHC RBC AUTO-ENTMCNC: 31.3 G/DL (ref 31.5–35.7)
MCV RBC AUTO: 83.9 FL (ref 79–97)
MCV RBC AUTO: 86.3 FL (ref 79–97)
MONOCYTES # BLD AUTO: 0.24 10*3/MM3 (ref 0.1–0.9)
MONOCYTES # BLD AUTO: 0.28 10*3/MM3 (ref 0.1–0.9)
MONOCYTES NFR BLD AUTO: 12.1 % (ref 5–12)
MONOCYTES NFR BLD AUTO: 13.8 % (ref 5–12)
NEUTROPHILS NFR BLD AUTO: 1.18 10*3/MM3 (ref 1.7–7)
NEUTROPHILS NFR BLD AUTO: 1.19 10*3/MM3 (ref 1.7–7)
NEUTROPHILS NFR BLD AUTO: 58.1 % (ref 42.7–76)
NEUTROPHILS NFR BLD AUTO: 60.2 % (ref 42.7–76)
NRBC BLD AUTO-RTO: 0 /100 WBC (ref 0–0.2)
NRBC BLD AUTO-RTO: 0 /100 WBC (ref 0–0.2)
OVALOCYTES BLD QL SMEAR: NORMAL
PHOSPHATE SERPL-MCNC: 4.2 MG/DL (ref 2.5–4.5)
PLATELET # BLD AUTO: 39 10*3/MM3 (ref 140–450)
PLATELET # BLD AUTO: 41 10*3/MM3 (ref 140–450)
PMV BLD AUTO: ABNORMAL FL
PMV BLD AUTO: ABNORMAL FL
POIKILOCYTOSIS BLD QL SMEAR: NORMAL
POTASSIUM SERPL-SCNC: 4.1 MMOL/L (ref 3.5–5.2)
POTASSIUM SERPL-SCNC: 4.1 MMOL/L (ref 3.5–5.2)
PROCALCITONIN SERPL-MCNC: 0.11 NG/ML (ref 0–0.25)
PROT SERPL-MCNC: 5.4 G/DL (ref 6–8.5)
PROT SERPL-MCNC: 5.4 G/DL (ref 6–8.5)
PROTHROMBIN TIME: 23.6 SECONDS (ref 11.8–14.9)
RBC # BLD AUTO: 2.67 10*6/MM3 (ref 4.14–5.8)
RBC # BLD AUTO: 2.78 10*6/MM3 (ref 4.14–5.8)
SODIUM SERPL-SCNC: 135 MMOL/L (ref 136–145)
SODIUM SERPL-SCNC: 136 MMOL/L (ref 136–145)
WBC MORPH BLD: NORMAL
WBC NRBC COR # BLD AUTO: 1.98 10*3/MM3 (ref 3.4–10.8)
WBC NRBC COR # BLD AUTO: 2.03 10*3/MM3 (ref 3.4–10.8)

## 2024-11-29 PROCEDURE — 74176 CT ABD & PELVIS W/O CONTRAST: CPT

## 2024-11-29 PROCEDURE — 82948 REAGENT STRIP/BLOOD GLUCOSE: CPT

## 2024-11-29 PROCEDURE — 84100 ASSAY OF PHOSPHORUS: CPT | Performed by: FAMILY MEDICINE

## 2024-11-29 PROCEDURE — 99232 SBSQ HOSP IP/OBS MODERATE 35: CPT | Performed by: FAMILY MEDICINE

## 2024-11-29 PROCEDURE — 85025 COMPLETE CBC W/AUTO DIFF WBC: CPT | Performed by: FAMILY MEDICINE

## 2024-11-29 PROCEDURE — 82948 REAGENT STRIP/BLOOD GLUCOSE: CPT | Performed by: INTERNAL MEDICINE

## 2024-11-29 PROCEDURE — 63710000001 INSULIN GLARGINE PER 5 UNITS: Performed by: INTERNAL MEDICINE

## 2024-11-29 PROCEDURE — 70450 CT HEAD/BRAIN W/O DYE: CPT

## 2024-11-29 PROCEDURE — 25010000002 ONDANSETRON PER 1 MG: Performed by: INTERNAL MEDICINE

## 2024-11-29 PROCEDURE — 84145 PROCALCITONIN (PCT): CPT | Performed by: PHYSICIAN ASSISTANT

## 2024-11-29 PROCEDURE — P9016 RBC LEUKOCYTES REDUCED: HCPCS

## 2024-11-29 PROCEDURE — 82248 BILIRUBIN DIRECT: CPT | Performed by: FAMILY MEDICINE

## 2024-11-29 PROCEDURE — 86923 COMPATIBILITY TEST ELECTRIC: CPT

## 2024-11-29 PROCEDURE — 36430 TRANSFUSION BLD/BLD COMPNT: CPT

## 2024-11-29 PROCEDURE — 99232 SBSQ HOSP IP/OBS MODERATE 35: CPT | Performed by: INTERNAL MEDICINE

## 2024-11-29 PROCEDURE — 99221 1ST HOSP IP/OBS SF/LOW 40: CPT | Performed by: SURGERY

## 2024-11-29 PROCEDURE — 86900 BLOOD TYPING SEROLOGIC ABO: CPT

## 2024-11-29 PROCEDURE — 25010000002 FUROSEMIDE PER 20 MG: Performed by: INTERNAL MEDICINE

## 2024-11-29 PROCEDURE — 85610 PROTHROMBIN TIME: CPT | Performed by: PHYSICIAN ASSISTANT

## 2024-11-29 PROCEDURE — 25010000002 PIPERACILLIN SOD-TAZOBACTAM PER 1 G: Performed by: FAMILY MEDICINE

## 2024-11-29 PROCEDURE — 85007 BL SMEAR W/DIFF WBC COUNT: CPT | Performed by: PHYSICIAN ASSISTANT

## 2024-11-29 PROCEDURE — 25510000002 IOHEXOL 12 MG/ML SOLUTION: Performed by: FAMILY MEDICINE

## 2024-11-29 PROCEDURE — 80053 COMPREHEN METABOLIC PANEL: CPT | Performed by: FAMILY MEDICINE

## 2024-11-29 PROCEDURE — 63710000001 INSULIN LISPRO (HUMAN) PER 5 UNITS: Performed by: INTERNAL MEDICINE

## 2024-11-29 PROCEDURE — 83735 ASSAY OF MAGNESIUM: CPT | Performed by: FAMILY MEDICINE

## 2024-11-29 PROCEDURE — 25010000002 MORPHINE PER 10 MG: Performed by: INTERNAL MEDICINE

## 2024-11-29 PROCEDURE — 25010000002 CEFTRIAXONE PER 250 MG: Performed by: INTERNAL MEDICINE

## 2024-11-29 PROCEDURE — 85025 COMPLETE CBC W/AUTO DIFF WBC: CPT | Performed by: PHYSICIAN ASSISTANT

## 2024-11-29 RX ADMIN — CARVEDILOL 25 MG: 25 TABLET, FILM COATED ORAL at 17:02

## 2024-11-29 RX ADMIN — SUCRALFATE 1 G: 1 TABLET ORAL at 17:02

## 2024-11-29 RX ADMIN — HYDROXYZINE HYDROCHLORIDE 25 MG: 25 TABLET ORAL at 17:02

## 2024-11-29 RX ADMIN — PANTOPRAZOLE SODIUM 40 MG: 40 INJECTION, POWDER, FOR SOLUTION INTRAVENOUS at 09:17

## 2024-11-29 RX ADMIN — OXYCODONE 10 MG: 5 TABLET ORAL at 10:44

## 2024-11-29 RX ADMIN — PANTOPRAZOLE SODIUM 40 MG: 40 INJECTION, POWDER, FOR SOLUTION INTRAVENOUS at 21:21

## 2024-11-29 RX ADMIN — CETIRIZINE HYDROCHLORIDE 5 MG: 10 TABLET, FILM COATED ORAL at 21:20

## 2024-11-29 RX ADMIN — SUCRALFATE 1 G: 1 TABLET ORAL at 21:21

## 2024-11-29 RX ADMIN — ROPINIROLE HYDROCHLORIDE 1 MG: 1 TABLET, FILM COATED ORAL at 21:21

## 2024-11-29 RX ADMIN — INSULIN LISPRO 4 UNITS: 100 INJECTION, SOLUTION INTRAVENOUS; SUBCUTANEOUS at 21:20

## 2024-11-29 RX ADMIN — SUCRALFATE 1 G: 1 TABLET ORAL at 09:17

## 2024-11-29 RX ADMIN — IOHEXOL 500 ML: 12 SOLUTION ORAL at 15:16

## 2024-11-29 RX ADMIN — LEVETIRACETAM 500 MG: 500 TABLET, FILM COATED ORAL at 21:20

## 2024-11-29 RX ADMIN — INSULIN LISPRO 2 UNITS: 100 INJECTION, SOLUTION INTRAVENOUS; SUBCUTANEOUS at 11:52

## 2024-11-29 RX ADMIN — LEVETIRACETAM 500 MG: 500 TABLET, FILM COATED ORAL at 09:17

## 2024-11-29 RX ADMIN — MORPHINE SULFATE 2 MG: 2 INJECTION, SOLUTION INTRAMUSCULAR; INTRAVENOUS at 02:46

## 2024-11-29 RX ADMIN — Medication 10 ML: at 09:18

## 2024-11-29 RX ADMIN — MORPHINE SULFATE 2 MG: 2 INJECTION, SOLUTION INTRAMUSCULAR; INTRAVENOUS at 21:21

## 2024-11-29 RX ADMIN — BACITRACIN 0.9 G: 500 OINTMENT TOPICAL at 21:20

## 2024-11-29 RX ADMIN — BUSPIRONE HYDROCHLORIDE 7.5 MG: 7.5 TABLET ORAL at 15:17

## 2024-11-29 RX ADMIN — LACTULOSE 30 G: 20 SOLUTION ORAL at 11:46

## 2024-11-29 RX ADMIN — LACTULOSE 30 G: 20 SOLUTION ORAL at 21:21

## 2024-11-29 RX ADMIN — CARVEDILOL 25 MG: 25 TABLET, FILM COATED ORAL at 09:17

## 2024-11-29 RX ADMIN — OXYCODONE 10 MG: 5 TABLET ORAL at 01:24

## 2024-11-29 RX ADMIN — INSULIN LISPRO 4 UNITS: 100 INJECTION, SOLUTION INTRAVENOUS; SUBCUTANEOUS at 17:02

## 2024-11-29 RX ADMIN — RIFAXIMIN 550 MG: 550 TABLET ORAL at 02:46

## 2024-11-29 RX ADMIN — INSULIN LISPRO 2 UNITS: 100 INJECTION, SOLUTION INTRAVENOUS; SUBCUTANEOUS at 09:17

## 2024-11-29 RX ADMIN — PIPERACILLIN AND TAZOBACTAM 4.5 G: 4; .5 INJECTION, POWDER, FOR SOLUTION INTRAVENOUS at 15:17

## 2024-11-29 RX ADMIN — BISACODYL 5 MG: 5 TABLET, COATED ORAL at 09:17

## 2024-11-29 RX ADMIN — LACTULOSE 30 G: 20 SOLUTION ORAL at 17:02

## 2024-11-29 RX ADMIN — SERTRALINE HYDROCHLORIDE 100 MG: 100 TABLET ORAL at 21:21

## 2024-11-29 RX ADMIN — FLUTICASONE PROPIONATE 1 SPRAY: 50 SPRAY, METERED NASAL at 09:19

## 2024-11-29 RX ADMIN — BACITRACIN 0.9 G: 500 OINTMENT TOPICAL at 15:17

## 2024-11-29 RX ADMIN — Medication 10 ML: at 21:24

## 2024-11-29 RX ADMIN — BUSPIRONE HYDROCHLORIDE 7.5 MG: 7.5 TABLET ORAL at 09:17

## 2024-11-29 RX ADMIN — LACTULOSE 30 G: 20 SOLUTION ORAL at 09:16

## 2024-11-29 RX ADMIN — FUROSEMIDE 40 MG: 10 INJECTION, SOLUTION INTRAMUSCULAR; INTRAVENOUS at 05:20

## 2024-11-29 RX ADMIN — MORPHINE SULFATE 2 MG: 2 INJECTION, SOLUTION INTRAMUSCULAR; INTRAVENOUS at 13:37

## 2024-11-29 RX ADMIN — Medication 250 MG: at 21:21

## 2024-11-29 RX ADMIN — HYDROXYZINE HYDROCHLORIDE 25 MG: 25 TABLET ORAL at 09:17

## 2024-11-29 RX ADMIN — Medication 2000 MG: at 13:26

## 2024-11-29 RX ADMIN — Medication 250 MG: at 09:17

## 2024-11-29 RX ADMIN — HYDROCORTISONE ACETATE 1 APPLICATION: 1 CREAM TOPICAL at 21:24

## 2024-11-29 RX ADMIN — INSULIN GLARGINE 10 UNITS: 100 INJECTION, SOLUTION SUBCUTANEOUS at 21:20

## 2024-11-29 RX ADMIN — ONDANSETRON 4 MG: 2 INJECTION INTRAMUSCULAR; INTRAVENOUS at 10:44

## 2024-11-29 RX ADMIN — SUCRALFATE 1 G: 1 TABLET ORAL at 11:46

## 2024-11-29 RX ADMIN — BUSPIRONE HYDROCHLORIDE 7.5 MG: 7.5 TABLET ORAL at 21:24

## 2024-11-29 RX ADMIN — MORPHINE SULFATE 2 MG: 2 INJECTION, SOLUTION INTRAMUSCULAR; INTRAVENOUS at 06:38

## 2024-11-29 RX ADMIN — RIFAXIMIN 550 MG: 550 TABLET ORAL at 15:18

## 2024-11-29 RX ADMIN — BACITRACIN 0.9 G: 500 OINTMENT TOPICAL at 09:17

## 2024-11-29 RX ADMIN — PIPERACILLIN AND TAZOBACTAM 4.5 G: 4; .5 INJECTION, POWDER, FOR SOLUTION INTRAVENOUS; PARENTERAL at 21:17

## 2024-11-29 NOTE — PROGRESS NOTES
[facility]  Inpatient Progress Note    Reason for Follow Up: Cirrhosis, history of GAVE, acute on chronic anemia secondary portal hypertensive gastropathy    Subjective     Interval History:   Status post EGD with APC of bleeding/oozing in the antrum related to portal hypertensive gastropathy  Hemoglobin improving  No events overnight    Current Facility-Administered Medications:     artificial tears ophthalmic ointment, , Both Eyes, Q1H PRN, Karlos Roblero MD, Given at 11/28/24 0517    bacitracin 500 UNIT/GM ointment 0.9 g, 1 Application, Topical, TID, Karlos Roblero MD, 0.9 g at 11/29/24 0917    sennosides-docusate (PERICOLACE) 8.6-50 MG per tablet 2 tablet, 2 tablet, Oral, BID PRN **AND** polyethylene glycol (MIRALAX) packet 17 g, 17 g, Oral, Daily PRN **AND** bisacodyl (DULCOLAX) EC tablet 5 mg, 5 mg, Oral, Daily PRN, 5 mg at 11/29/24 0917 **AND** bisacodyl (DULCOLAX) suppository 10 mg, 10 mg, Rectal, Daily PRN, Karlos Roblero MD    busPIRone (BUSPAR) tablet 7.5 mg, 7.5 mg, Oral, TID, Karlos Roblero MD, 7.5 mg at 11/29/24 0917    carvedilol (COREG) tablet 25 mg, 25 mg, Oral, BID With Meals, Karlos Roblero MD, 25 mg at 11/29/24 0917    cefTRIAXone (ROCEPHIN) in NS 2 GRAMS/20ml IV PUSH syringe, 2,000 mg, Intravenous, Q24H, Karlos Roblero MD, 2,000 mg at 11/28/24 1237    cetirizine (zyrTEC) tablet 5 mg, 5 mg, Oral, Nightly, Karlos Roblero MD, 5 mg at 11/28/24 2042    dextrose (D50W) (25 g/50 mL) IV injection 25 g, 25 g, Intravenous, Q15 Min PRN, Karlos Roblero MD    dextrose (GLUTOSE) oral gel 15 g, 15 g, Oral, Q15 Min PRN, Karlos Roblero MD    Diclofenac Sodium (VOLTAREN) 1 % gel 2 g, 2 g, Topical, 4x Daily, Dominick Enamorado MD    fluticasone (FLONASE) 50 MCG/ACT nasal spray 1 spray, 1 spray, Nasal, Daily, Karlos Roblero MD, 1 spray at 11/29/24 0919    [Held by provider] furosemide (LASIX) injection 40 mg, 40  mg, Intravenous, Q12H, Karlos Roblero MD, 40 mg at 11/29/24 0520    glucagon (GLUCAGEN) injection 1 mg, 1 mg, Intramuscular, Q15 Min PRN, Karlos Roblero MD    hydrocortisone 1 % cream 1 Application, 1 Application, Topical, Q12H, Karlos Roblero MD, 1 Application at 11/28/24 2043    hydrOXYzine (ATARAX) tablet 25 mg, 25 mg, Oral, Q8H PRN, Karlos Roblero MD, 25 mg at 11/29/24 0917    insulin glargine (LANTUS, SEMGLEE) injection 10 Units, 10 Units, Subcutaneous, Nightly, Karlos Roblero MD, 10 Units at 11/28/24 2042    Insulin Lispro (humaLOG) injection 2-9 Units, 2-9 Units, Subcutaneous, 4x Daily AC & at Bedtime, Karlos Roblero MD, 2 Units at 11/29/24 1152    lactulose (CHRONULAC) 10 GM/15ML solution 30 g, 30 g, Oral, 4x Daily, Karlos Roblero MD, 30 g at 11/29/24 1146    levETIRAcetam (KEPPRA) tablet 500 mg, 500 mg, Oral, BID, Karlos Roblero MD, 500 mg at 11/29/24 0917    morphine injection 2 mg, 2 mg, Intravenous, Q4H PRN, Karlos Roblero MD, 2 mg at 11/29/24 0638    ondansetron (ZOFRAN) injection 4 mg, 4 mg, Intravenous, Q6H PRN, Karlos Roblero MD, 4 mg at 11/29/24 1044    oxyCODONE (ROXICODONE) immediate release tablet 10 mg, 10 mg, Oral, Q8H PRN, Karlos Roblero MD, 10 mg at 11/29/24 1044    pantoprazole (PROTONIX) injection 40 mg, 40 mg, Intravenous, Q12H, Karlos Roblero MD, 40 mg at 11/29/24 0917    riFAXIMin (XIFAXAN) tablet 550 mg, 550 mg, Oral, Q12H, Bandar Burch MD, 550 mg at 11/29/24 0246    rOPINIRole (REQUIP) tablet 1 mg, 1 mg, Oral, Nightly, Karlos Roblero MD, 1 mg at 11/28/24 2042    saccharomyces boulardii (FLORASTOR) capsule 250 mg, 250 mg, Oral, BID, Karlos Roblero MD, 250 mg at 11/29/24 0917    sertraline (ZOLOFT) tablet 100 mg, 100 mg, Oral, Nightly, Karlos Roblero MD, 100 mg at 11/28/24 2042    sodium chloride 0.9 % flush 10 mL, 10 mL, Intravenous, PRN, Karlos Roblero,  MD    sodium chloride 0.9 % flush 10 mL, 10 mL, Intravenous, Q12H, Karlos Roblero MD, 10 mL at 11/29/24 0918    sodium chloride 0.9 % flush 10 mL, 10 mL, Intravenous, PRN, Karlos Roblero MD    sodium chloride 0.9 % infusion 40 mL, 40 mL, Intravenous, PRN, Karlos Roblero MD    [Held by provider] spironolactone (ALDACTONE) tablet 100 mg, 100 mg, Oral, Daily, Karlos Roblero MD, 100 mg at 11/28/24 0953    sucralfate (CARAFATE) tablet 1 g, 1 g, Oral, 4x Daily, Karlos Roblero MD, 1 g at 11/29/24 1146  Review of Systems:    All systems were reviewed and negative except for that previously mentioned in the HPI    Objective     Vital Signs  Temp:  [97.4 °F (36.3 °C)-98.3 °F (36.8 °C)] 97.9 °F (36.6 °C)  Heart Rate:  [69-74] 72  Resp:  [18] 18  BP: ()/(40-97) 115/54  Body mass index is 42.51 kg/m².    Intake/Output Summary (Last 24 hours) at 11/29/2024 1240  Last data filed at 11/29/2024 0904  Gross per 24 hour   Intake 850 ml   Output --   Net 850 ml     I/O this shift:  In: 240 [P.O.:240]  Out: -      Physical Exam:   General: patient awake, alert and cooperative   Eyes: Normal lids and lashes, no scleral icterus   Neck: supple, normal ROM   Skin: warm and dry, not jaundiced   Cardiovascular: regular rhythm and rate, no murmurs auscultated   Pulm: clear to auscultation bilaterally, regular and unlabored   Abdomen: soft, nontender, nondistended; normal bowel sounds   Rectal: deferred   Extremities: no rash or edema   Psychiatric: Normal mood and behavior; memory intact     Results Review:     I reviewed the patient's new clinical results.    Results from last 7 days   Lab Units 11/29/24  0531 11/28/24  0522 11/27/24  0523   WBC 10*3/mm3 1.98* 4.44 3.69   HEMOGLOBIN g/dL 7.0* 8.2* 7.5*   HEMATOCRIT % 22.4* 26.8* 24.6*   PLATELETS 10*3/mm3 41* 66* 53*     Results from last 7 days   Lab Units 11/29/24  0531 11/28/24  0522 11/27/24  0523   SODIUM mmol/L 135* 134* 138   POTASSIUM  mmol/L 4.1 4.0 4.2   CHLORIDE mmol/L 101 101 101   CO2 mmol/L 27.5 25.1 27.9   BUN mg/dL 25* 24* 25*   CREATININE mg/dL 1.41* 1.40* 1.37*   CALCIUM mg/dL 8.3* 8.6 8.8   BILIRUBIN mg/dL 0.8 1.3* 1.7*   ALK PHOS U/L 111 128* 123*   ALT (SGPT) U/L 17 21 19   AST (SGOT) U/L 39 50* 43*   GLUCOSE mg/dL 235* 119* 167*     Results from last 7 days   Lab Units 11/29/24  0531 11/27/24  0523 11/26/24  1205   INR  2.06* 2.16* 2.11*     Lab Results   Lab Value Date/Time    LIPASE 37 11/25/2024 1818    LIPASE 33 08/05/2024 1150    LIPASE 35 12/01/2023 1204    LIPASE 42 05/18/2023 1433    LIPASE 63 (H) 03/24/2023 1040    LIPASE 38 02/05/2023 1532    LIPASE 40 12/18/2022 0238    LIPASE 55 11/04/2022 1731    LIPASE 68 (H) 10/31/2022 0924    LIPASE 52 06/27/2022 1449    LIPASE 20 08/03/2021 1206    LIPASE 28 06/01/2021 1622       Radiology:  CT Abdomen Pelvis Without Contrast    Result Date: 11/29/2024  Impression: 1.Cirrhotic liver. 2.Splenomegaly, not significantly changed. 3.Small amount of peritoneal fluid, less than on 11/25/2024. The patient is undergone interim paracentesis. Anasarca. 4.Mildly enlarged prostate gland. 5.Left inguinal adenopathy. Electronically Signed: Yury Starks MD  11/29/2024 11:39 AM EST  Workstation ID: [id number]    CT Head Without Contrast    Result Date: 11/29/2024  Impression: No acute intracranial process identified. Electronically Signed: Emanuel Dewey MD  11/29/2024 11:36 AM EST  Workstation ID: [id number]         Assessment:       Hepatic encephalopathy    Sleep apnea    CHF (congestive heart failure)    Chronic low back pain    Type 2 diabetes mellitus with hyperglycemia    Gastrointestinal hemorrhage associated with peptic ulcer    Anxiety    Stroke    Anasarca    Chronic anemia    Chronic kidney disease       Plan:   Continue PPI daily  Okay to discharge to [facility]  Continue regular outpatient hemoglobin monitoring  Can follow-up with GI as outpatient  Long-term prognosis poor    I  discussed the patients findings and my recommendations with patient.    Karlos Roblero M.D.  Gastroenterology
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
## 2024-11-29 NOTE — PLAN OF CARE
Goal Outcome Evaluation:         Pleasant patient rested in bed throughout the day with frequent complaints of pain and discomfort in back and abdominal area. Medicated per MAR. New IV inserted per IV therapy nurse. 1 unit RBC's administered per order; no concerns. Down for CT of abdomen, pelvis and head. Scans resulted, patient drank omnipaque and will be re-scanned around 2000. Blood glucose monitored per order and supplemental insulin given. Skin and wound care performed per order. Patient had BM today. Patient up ad myrtle and refuses bed alert. Educated on falls and safety. No concerns at this time, continue plan of care.

## 2024-11-29 NOTE — PLAN OF CARE
Goal Outcome Evaluation:         Pt Aox4 throughout the shift, on room air, and up ad-myrtle with the walker. Pt complaint of pain a few times during the shift, medicated per MAR. BP low, MD ordered albumin, consent signed and given per orders. Pt refusing bed alarm, educated on reason for becoming a falls risk and to utilize call light, pt verbalized understanding and is compliant. Skin care completed per orders. VSS, no other acute changes noted this shift. Pt appears to be in no apparent distress at this time, denies any current needs, and has call light within reach.

## 2024-11-29 NOTE — PROGRESS NOTES
[facility]   Hospitalist Progress Note  Date: 2024  Patient Name: Nic Nicolas  : 1965  MRN: [id number]  Date of admission: 2024      Subjective   Subjective     Chief Complaint: Shortness of breath confusion hematemesis    Summary: Patient is a 59-year-old male past medical history significant for cirrhosis, TBI, diabetes, hypertension, hyperlipidemia, CHF, CKD, asthma, obesity, recurrent hepatic encephalopathy that presents to the emergency department after being seen by his gastroenterologist for confusion and unsteadiness shortness of breath and a week of hematemesis patient evaluated emergency department was encephalopathic with elevated ammonia appeared volume overloaded patient with worsening anemia has been admitted to the hospitalist service GI consulted patient transfused 1 unit of packed red blood cell will likely need endoscopic evaluation started on ceftriaxone for decompensated cirrhosis continuing rifaximin and lactulose continue with Carafate adding IV PPI    Interval Followup: 2024    Pancytopenic   white count 1.98  Hgb 7.0  Platelets 44  INR 2.0   Reports some lower abdominal discomfort.  Had bloody bowel movement per patient.    Received albumin last night  x 1 for low BP.    Blood pressure this morning 125/90   Patient up in room ambulatory.  Weak but no dawood lightheadedness/dizziness   Tolerating IV PPI/ Carafate     Objective   Objective     Vitals:   Temp:  [97.4 °F (36.3 °C)-98.3 °F (36.8 °C)] 97.7 °F (36.5 °C)  Heart Rate:  [69-74] 69  Resp:  [18] 18  BP: ()/(40-97) 116/97  Physical Exam   Constitutional: Awake alert no acute distress.    Respiratory diminished  Cardiovascular RRR  GI: Abdomen obese, soft mildly tender distended.  Scattered ecchymosis  Extremities scattered bruising    Result Review    Result Review:  I have personally reviewed the pertinent results from the past 24 hours to 2024 08:43 EST and agree with these findings:  [x]   Laboratory   CBC          11/27/2024    05:23 11/28/2024    05:22 11/29/2024    05:31   CBC   WBC 3.69  4.44  1.98    RBC 2.89  3.15  2.67    Hemoglobin 7.5  8.2  7.0    Hematocrit 24.6  26.8  22.4    MCV 85.1  85.1  83.9    MCH 26.0  26.0  26.2    MCHC 30.5  30.6  31.3    RDW 19.7  19.3  18.7    Platelets 53  66  41      BMP          11/27/2024    05:23 11/28/2024    05:22 11/29/2024    05:31   BMP   BUN 25  24  25    Creatinine 1.37  1.40  1.41    Sodium 138  134  135    Potassium 4.2  4.0  4.1    Chloride 101  101  101    CO2 27.9  25.1  27.5    Calcium 8.8  8.6  8.3      LIVER FUNCTION TESTS:      Lab 11/29/24  0531 11/28/24  0522 11/27/24  0523 11/25/24  1818   TOTAL PROTEIN 5.4* 6.2 5.6* 6.3   ALBUMIN 2.9* 3.1* 2.8* 3.1*   GLOBULIN  --   --  2.8 3.2   ALT (SGPT) 17 21 19 22   AST (SGOT) 39 50* 43* 45*   BILIRUBIN 0.8 1.3* 1.7* 1.6*   INDIRECT BILIRUBIN 0.4 0.7  --   --    BILIRUBIN DIRECT 0.4* 0.6* 0.7*  --    ALK PHOS 111 128* 123* 142*   LIPASE  --   --   --  37       [x]  Microbiology   Microbiology Results (last 10 days)       Procedure Component Value - Date/Time    Body Fluid Culture - Body Fluid, Peritoneum [[id number]] Collected: 11/27/24 0957    Lab Status: Preliminary result Specimen: Body Fluid from Peritoneum Updated: 11/29/24 0717     Body Fluid Culture No growth at 2 days     Gram Stain Moderate (3+) WBCs seen      No organisms seen              [x]  Radiology US Paracentesis    Result Date: 11/27/2024  Impression: Successful ultrasound-guided diagnostic paracentesis without immediate complication.  Electronically Signed: Jimmy Jordan MD  11/27/2024 10:10 AM EST  Workstation ID: [id number]    XR Chest 1 View    Result Date: 11/26/2024  Mild infiltrate or atelectasis at the right base. Electronically Signed: Kye Breaux MD  11/26/2024 5:47 AM EST  Workstation ID: [id number]    CT Abdomen Pelvis With Contrast    Result Date: 11/25/2024  Impression: 1.Small to moderate volume ascites most  pronounced in the right upper and lower quadrants. 2.Cirrhosis with splenomegaly. 3.Cholelithiasis without gallbladder inflammatory changes. 4.Fat and fluid-containing umbilical hernia, similar to prior examination. 5.Increasing simple appearing fluid within the left inguinal canal now measuring 5.4 cm in diameter, likely ascites related. 6.Mild generalized body wall edema. Electronically Signed: Emanuel Dewey MD  11/25/2024 8:48 PM EST  Workstation ID: [id number]       []  EKG/Telemetry   No orders to display       []  Cardiology/Vascular   []  Pathology  [x]  Old records  []  Other:    Assessment & Plan   Assessment / Plan     Assessment:    Hematemesis / Suspect acute upper GI bleed  Acute on chronic anemia  Acute decompensated cirrhosis  Status post paracentesis 11/27/24.  130 cc.    Status post GD 11/27/24.  Bleeding treated with argon plasma coagulopathy.  ROMO  Acute on chronic diastolic congestive heart failure  Anasarca  Thrombocytopenia secondary to cirrhosis  Hepatic encephalopathy  CKD stage III  History of TBI  Splenomegaly  Diabetes  History of DVT  Nursing home long-term care resident: [place]  Bleeding portal hypertensive gastropathy in the setting of thrombocytopenia/elevated INR  Pancytopenia  Code Status: DNR    Plan:    1 unit PRBCs today.  Monitor CBC closely  CT abdomen/pelvis without contrast in light of lower abdominal discomfort/bruising  Gastroenterology consultation.   Continue IV  Lasix 40 IV BID.   Hold 1 dose today.  Monitor BP trend.  Continue Aldactone   Hold 1 dose today.  Monitor BP trend.  Continue IV ceftriaxone for decompensated liver cirrhosis  Fluids/Na restriction.  Continuing lactulose and rifaximin for hepatic encephalopathy  Continue basal, bolus, correction insulin  Wound care consultation  Disposition: Once medically stable, plan on returning to [place] (long-term resident)  Long-term prognosis poor.  Palliative care consult.     Discussed plan with RN.    VTE  Prophylaxis:  Mechanical VTE prophylaxis orders are present.        CODE STATUS:   Medical Intervention Limits: No intubation (DNI)  Level Of Support Discussed With: Patient; Next of Kin (If No Surrogate)  Code Status (Patient has no pulse and is not breathing): No CPR (Do Not Attempt to Resuscitate)  Medical Interventions (Patient has pulse or is breathing): Limited Support         Attending documentation:  I reviewed the above documentation and independently reviewed and rounded and evaluated the patient and discussed the care plan with SANDOR Goetz PA-C, I agree with his findings and plan as documented, what I have added to the care plan and modified is as follows in my documentation and my medical decision making; 59-year-old male with history of TBI, sleep apnea, hypertension, history of DVT, diabetes, depression, liver cirrhosis with splenomegaly, anxiety, history of gastric ulcers with bleed, chronic diastolic CHF, hospitalized on 11/25/2024 with chief complaint of unsteadiness, confusion, shortness of breath, found to have an ammonia level of 69, acute anemia requiring blood transfusion, creatinine 1.28, sodium 134, coagulopathy due to liver disease, PRBC ordered for transfusion, IR consulted for paracentesis of ascites fluid, reported hematemesis for a week, seen in GI clinic, sent to the emergency room, status post EGD, found to have portal hypertensive gastropathy with oozing, treated with argon plasma coagulation.  Interval follow-up: Patient seen and examined this morning, no acute distress, no acute major overnight events, seems to be at his baseline, has increased swelling of his scrotum, with some bruising as well as bruising on his abdomen.  CT scan of the abdomen and pelvis was performed which showed cirrhotic liver, still has anasarca, left inguinal adenopathy.  CT head was done as well which showed no acute intracranial abnormality.  White blood cell count did fall to 1.9K, with absolute neutrophils  at 1190, platelets down to 41,000, hemoglobin right at 7.0.  Closely following this, may need blood transfusion.  Review of systems obtained, all systems reviewed and negative except weakness and fatigue, intermittent confusion, scrotal pain and bruising, abdominal bruising.  On physical exam elderly appearing male with very subtle confusion, sitting at the edge of the bed, no acute distress, edematous, regular rate rhythm, diminished breath sounds, soft nontender abdomen with bruising, with distention, pallor, lower extremity edema.  Alert and oriented to self and surroundings.  Assessment as above, volume overloaded, acute anemia, concern for hematemesis prior to hospitalization, on EGD found to have acute oozing, likely etiology of acute blood loss anemia, history of GAVE, long-term nursing home resident, with other medical history as listed above with hyperammonemia and hepatic encephalopathy, status post EGD and argon plasma coagulation of oozing sites that were bleeding.  Plan, continue lactulose to 30 g 4 times a day, following blood counts, if his hemoglobin drops to less than 7, will need to transfuse 1 unit PRBC, continue Protonix to IV 40 mg twice daily for 72 hours, will switch to p.o. tomorrow, continue rifaximin 550 mg twice a day, continue Carafate, spironolactone, holding Lasix 40 mg IV twice daily, will reassess in 24 hours given rising creatinine, strict I's and O's, daily weights, ,After rounds, radiology contacted regarding addendum to CT abdomen pelvis which showed tiny gas collections adjacent to the colon questionable extraluminal, repeat CT scan ordered, general surgery consulted out of an abundance of precaution, broad-spectrum antibiotics ordered, discontinued ceftriaxone and ordered Zosyn while we await repeat scan, GI consulted insulin sliding scale coverage, out of bed to chair, PT/OT, a.m. labs, full code, DVT prophylaxis with SCDs, clinical course dictate further management, More than  80 % of the time of this patient's encounter was performed by me, this included face-to-face time, planning and coordinating, medical decision making and critical thinking personally done by me.    Electronically signed by Bandar Burch MD, 11/29/2024, 12:00 EST.    Portions of this documentation were transcribed electronically from a voice recognition software.  I confirm all data accurately represents the service(s) I performed at today's visit.

## 2024-11-30 LAB
ALBUMIN SERPL-MCNC: 2.9 G/DL (ref 3.5–5.2)
ALP SERPL-CCNC: 102 U/L (ref 39–117)
ALT SERPL W P-5'-P-CCNC: 16 U/L (ref 1–41)
AMMONIA BLD-SCNC: 62 UMOL/L (ref 16–60)
ANION GAP SERPL CALCULATED.3IONS-SCNC: 6.7 MMOL/L (ref 5–15)
AST SERPL-CCNC: 37 U/L (ref 1–40)
BACTERIA FLD CULT: NORMAL
BASOPHILS # BLD AUTO: 0.02 10*3/MM3 (ref 0–0.2)
BASOPHILS NFR BLD AUTO: 0.9 % (ref 0–1.5)
BH BB BLOOD EXPIRATION DATE: NORMAL
BH BB BLOOD TYPE BARCODE: 6200
BH BB DISPENSE STATUS: NORMAL
BH BB PRODUCT CODE: NORMAL
BH BB UNIT NUMBER: NORMAL
BILIRUB CONJ SERPL-MCNC: 0.5 MG/DL (ref 0–0.3)
BILIRUB INDIRECT SERPL-MCNC: 0.5 MG/DL
BILIRUB SERPL-MCNC: 1 MG/DL (ref 0–1.2)
BUN SERPL-MCNC: 24 MG/DL (ref 6–20)
BUN/CREAT SERPL: 17.9 (ref 7–25)
CALCIUM SPEC-SCNC: 7.9 MG/DL (ref 8.6–10.5)
CHLORIDE SERPL-SCNC: 106 MMOL/L (ref 98–107)
CO2 SERPL-SCNC: 25.3 MMOL/L (ref 22–29)
CREAT SERPL-MCNC: 1.34 MG/DL (ref 0.76–1.27)
CROSSMATCH INTERPRETATION: NORMAL
DEPRECATED RDW RBC AUTO: 58.9 FL (ref 37–54)
EGFRCR SERPLBLD CKD-EPI 2021: 61.4 ML/MIN/1.73
EOSINOPHIL # BLD AUTO: 0.07 10*3/MM3 (ref 0–0.4)
EOSINOPHIL NFR BLD AUTO: 3.3 % (ref 0.3–6.2)
ERYTHROCYTE [DISTWIDTH] IN BLOOD BY AUTOMATED COUNT: 18.4 % (ref 12.3–15.4)
GLUCOSE BLDC GLUCOMTR-MCNC: 176 MG/DL (ref 70–99)
GLUCOSE BLDC GLUCOMTR-MCNC: 179 MG/DL (ref 70–99)
GLUCOSE BLDC GLUCOMTR-MCNC: 187 MG/DL (ref 70–99)
GLUCOSE BLDC GLUCOMTR-MCNC: 220 MG/DL (ref 70–99)
GLUCOSE SERPL-MCNC: 159 MG/DL (ref 65–99)
GRAM STN SPEC: NORMAL
GRAM STN SPEC: NORMAL
HCT VFR BLD AUTO: 24.5 % (ref 37.5–51)
HGB BLD-MCNC: 7.5 G/DL (ref 13–17.7)
IMM GRANULOCYTES # BLD AUTO: 0 10*3/MM3 (ref 0–0.05)
IMM GRANULOCYTES NFR BLD AUTO: 0 % (ref 0–0.5)
LYMPHOCYTES # BLD AUTO: 0.43 10*3/MM3 (ref 0.7–3.1)
LYMPHOCYTES NFR BLD AUTO: 20.3 % (ref 19.6–45.3)
MAGNESIUM SERPL-MCNC: 2 MG/DL (ref 1.6–2.6)
MCH RBC QN AUTO: 26.8 PG (ref 26.6–33)
MCHC RBC AUTO-ENTMCNC: 30.6 G/DL (ref 31.5–35.7)
MCV RBC AUTO: 87.5 FL (ref 79–97)
MONOCYTES # BLD AUTO: 0.22 10*3/MM3 (ref 0.1–0.9)
MONOCYTES NFR BLD AUTO: 10.4 % (ref 5–12)
NEUTROPHILS NFR BLD AUTO: 1.38 10*3/MM3 (ref 1.7–7)
NEUTROPHILS NFR BLD AUTO: 65.1 % (ref 42.7–76)
NRBC BLD AUTO-RTO: 0 /100 WBC (ref 0–0.2)
PHOSPHATE SERPL-MCNC: 3.3 MG/DL (ref 2.5–4.5)
PLATELET # BLD AUTO: 40 10*3/MM3 (ref 140–450)
PMV BLD AUTO: 11.5 FL (ref 6–12)
POTASSIUM SERPL-SCNC: 4.1 MMOL/L (ref 3.5–5.2)
PROT SERPL-MCNC: 5.3 G/DL (ref 6–8.5)
RBC # BLD AUTO: 2.8 10*6/MM3 (ref 4.14–5.8)
SODIUM SERPL-SCNC: 138 MMOL/L (ref 136–145)
UNIT  ABO: NORMAL
UNIT  RH: NORMAL
WBC NRBC COR # BLD AUTO: 2.12 10*3/MM3 (ref 3.4–10.8)

## 2024-11-30 PROCEDURE — 25010000002 MORPHINE PER 10 MG: Performed by: INTERNAL MEDICINE

## 2024-11-30 PROCEDURE — 99232 SBSQ HOSP IP/OBS MODERATE 35: CPT | Performed by: FAMILY MEDICINE

## 2024-11-30 PROCEDURE — 63710000001 INSULIN LISPRO (HUMAN) PER 5 UNITS: Performed by: INTERNAL MEDICINE

## 2024-11-30 PROCEDURE — 82140 ASSAY OF AMMONIA: CPT | Performed by: FAMILY MEDICINE

## 2024-11-30 PROCEDURE — 83735 ASSAY OF MAGNESIUM: CPT | Performed by: FAMILY MEDICINE

## 2024-11-30 PROCEDURE — 80048 BASIC METABOLIC PNL TOTAL CA: CPT | Performed by: FAMILY MEDICINE

## 2024-11-30 PROCEDURE — 80076 HEPATIC FUNCTION PANEL: CPT | Performed by: FAMILY MEDICINE

## 2024-11-30 PROCEDURE — 82948 REAGENT STRIP/BLOOD GLUCOSE: CPT

## 2024-11-30 PROCEDURE — 63710000001 INSULIN GLARGINE PER 5 UNITS: Performed by: INTERNAL MEDICINE

## 2024-11-30 PROCEDURE — 84100 ASSAY OF PHOSPHORUS: CPT | Performed by: FAMILY MEDICINE

## 2024-11-30 PROCEDURE — 82948 REAGENT STRIP/BLOOD GLUCOSE: CPT | Performed by: INTERNAL MEDICINE

## 2024-11-30 PROCEDURE — 25010000002 FUROSEMIDE PER 20 MG: Performed by: FAMILY MEDICINE

## 2024-11-30 PROCEDURE — 25010000002 PIPERACILLIN SOD-TAZOBACTAM PER 1 G: Performed by: FAMILY MEDICINE

## 2024-11-30 PROCEDURE — 85025 COMPLETE CBC W/AUTO DIFF WBC: CPT | Performed by: FAMILY MEDICINE

## 2024-11-30 PROCEDURE — 99231 SBSQ HOSP IP/OBS SF/LOW 25: CPT | Performed by: SURGERY

## 2024-11-30 RX ADMIN — BUSPIRONE HYDROCHLORIDE 7.5 MG: 7.5 TABLET ORAL at 17:47

## 2024-11-30 RX ADMIN — INSULIN LISPRO 2 UNITS: 100 INJECTION, SOLUTION INTRAVENOUS; SUBCUTANEOUS at 12:22

## 2024-11-30 RX ADMIN — BUSPIRONE HYDROCHLORIDE 7.5 MG: 7.5 TABLET ORAL at 09:15

## 2024-11-30 RX ADMIN — DICLOFENAC SODIUM 2 G: 10 GEL TOPICAL at 09:17

## 2024-11-30 RX ADMIN — HYDROCORTISONE ACETATE 1 APPLICATION: 1 CREAM TOPICAL at 09:17

## 2024-11-30 RX ADMIN — OXYCODONE 10 MG: 5 TABLET ORAL at 09:19

## 2024-11-30 RX ADMIN — LACTULOSE 30 G: 20 SOLUTION ORAL at 17:47

## 2024-11-30 RX ADMIN — HYDROCORTISONE ACETATE 1 APPLICATION: 1 CREAM TOPICAL at 20:40

## 2024-11-30 RX ADMIN — FUROSEMIDE 40 MG: 10 INJECTION, SOLUTION INTRAMUSCULAR; INTRAVENOUS at 17:47

## 2024-11-30 RX ADMIN — HYDROXYZINE HYDROCHLORIDE 25 MG: 25 TABLET ORAL at 20:42

## 2024-11-30 RX ADMIN — MORPHINE SULFATE 2 MG: 2 INJECTION, SOLUTION INTRAMUSCULAR; INTRAVENOUS at 02:28

## 2024-11-30 RX ADMIN — RIFAXIMIN 550 MG: 550 TABLET ORAL at 15:51

## 2024-11-30 RX ADMIN — BUSPIRONE HYDROCHLORIDE 7.5 MG: 7.5 TABLET ORAL at 20:41

## 2024-11-30 RX ADMIN — FLUTICASONE PROPIONATE 1 SPRAY: 50 SPRAY, METERED NASAL at 09:18

## 2024-11-30 RX ADMIN — PANTOPRAZOLE SODIUM 40 MG: 40 INJECTION, POWDER, FOR SOLUTION INTRAVENOUS at 09:15

## 2024-11-30 RX ADMIN — SUCRALFATE 1 G: 1 TABLET ORAL at 20:42

## 2024-11-30 RX ADMIN — RIFAXIMIN 550 MG: 550 TABLET ORAL at 02:28

## 2024-11-30 RX ADMIN — INSULIN GLARGINE 10 UNITS: 100 INJECTION, SOLUTION SUBCUTANEOUS at 20:42

## 2024-11-30 RX ADMIN — Medication 10 ML: at 09:16

## 2024-11-30 RX ADMIN — PANTOPRAZOLE SODIUM 40 MG: 40 INJECTION, POWDER, FOR SOLUTION INTRAVENOUS at 20:41

## 2024-11-30 RX ADMIN — BACITRACIN 0.9 G: 500 OINTMENT TOPICAL at 09:16

## 2024-11-30 RX ADMIN — CARVEDILOL 25 MG: 25 TABLET, FILM COATED ORAL at 18:18

## 2024-11-30 RX ADMIN — INSULIN LISPRO 2 UNITS: 100 INJECTION, SOLUTION INTRAVENOUS; SUBCUTANEOUS at 20:43

## 2024-11-30 RX ADMIN — LACTULOSE 30 G: 20 SOLUTION ORAL at 09:15

## 2024-11-30 RX ADMIN — CARVEDILOL 25 MG: 25 TABLET, FILM COATED ORAL at 09:15

## 2024-11-30 RX ADMIN — SUCRALFATE 1 G: 1 TABLET ORAL at 12:23

## 2024-11-30 RX ADMIN — SUCRALFATE 1 G: 1 TABLET ORAL at 09:15

## 2024-11-30 RX ADMIN — HYDROXYZINE HYDROCHLORIDE 25 MG: 25 TABLET ORAL at 02:28

## 2024-11-30 RX ADMIN — LEVETIRACETAM 500 MG: 500 TABLET, FILM COATED ORAL at 09:15

## 2024-11-30 RX ADMIN — BACITRACIN 0.9 G: 500 OINTMENT TOPICAL at 15:52

## 2024-11-30 RX ADMIN — SUCRALFATE 1 G: 1 TABLET ORAL at 17:47

## 2024-11-30 RX ADMIN — BACITRACIN 0.9 G: 500 OINTMENT TOPICAL at 20:41

## 2024-11-30 RX ADMIN — ROPINIROLE HYDROCHLORIDE 1 MG: 1 TABLET, FILM COATED ORAL at 20:41

## 2024-11-30 RX ADMIN — SERTRALINE HYDROCHLORIDE 100 MG: 100 TABLET ORAL at 20:42

## 2024-11-30 RX ADMIN — Medication 250 MG: at 20:42

## 2024-11-30 RX ADMIN — OXYCODONE 10 MG: 5 TABLET ORAL at 17:47

## 2024-11-30 RX ADMIN — PIPERACILLIN AND TAZOBACTAM 4.5 G: 4; .5 INJECTION, POWDER, FOR SOLUTION INTRAVENOUS; PARENTERAL at 06:16

## 2024-11-30 RX ADMIN — DICLOFENAC SODIUM 2 G: 10 GEL TOPICAL at 12:22

## 2024-11-30 RX ADMIN — Medication 10 ML: at 20:43

## 2024-11-30 RX ADMIN — LEVETIRACETAM 500 MG: 500 TABLET, FILM COATED ORAL at 20:42

## 2024-11-30 RX ADMIN — DICLOFENAC SODIUM 2 G: 10 GEL TOPICAL at 20:41

## 2024-11-30 RX ADMIN — PIPERACILLIN AND TAZOBACTAM 4.5 G: 4; .5 INJECTION, POWDER, FOR SOLUTION INTRAVENOUS; PARENTERAL at 15:51

## 2024-11-30 RX ADMIN — OXYCODONE 10 MG: 5 TABLET ORAL at 00:16

## 2024-11-30 RX ADMIN — INSULIN LISPRO 2 UNITS: 100 INJECTION, SOLUTION INTRAVENOUS; SUBCUTANEOUS at 09:14

## 2024-11-30 RX ADMIN — LACTULOSE 30 G: 20 SOLUTION ORAL at 12:23

## 2024-11-30 RX ADMIN — CETIRIZINE HYDROCHLORIDE 5 MG: 10 TABLET, FILM COATED ORAL at 20:41

## 2024-11-30 RX ADMIN — Medication 250 MG: at 09:15

## 2024-11-30 RX ADMIN — INSULIN LISPRO 2 UNITS: 100 INJECTION, SOLUTION INTRAVENOUS; SUBCUTANEOUS at 17:47

## 2024-11-30 RX ADMIN — PIPERACILLIN AND TAZOBACTAM 4.5 G: 4; .5 INJECTION, POWDER, FOR SOLUTION INTRAVENOUS; PARENTERAL at 22:53

## 2024-11-30 RX ADMIN — MORPHINE SULFATE 2 MG: 2 INJECTION, SOLUTION INTRAMUSCULAR; INTRAVENOUS at 06:16

## 2024-11-30 NOTE — PLAN OF CARE
Goal Outcome Evaluation:         Patient alert and oriented. Vital signs stable. Patient able to ambulate with walker in hallway. Complains of 6/10 generalized pain throughout day. Blood glucose monitored before meals. Patient has no new complaints or symptoms at this time. Continue plan of care.

## 2024-11-30 NOTE — PROGRESS NOTES
[facility]   Hospitalist Progress Note  Date: 2024  Patient Name: Nic Nicolas  : 1965  MRN: [id number]  Date of admission: 2024      Subjective   Subjective     Chief Complaint: Shortness of breath confusion hematemesis    Summary: Patient is a 59-year-old male past medical history significant for cirrhosis, TBI, diabetes, hypertension, hyperlipidemia, CHF, CKD, asthma, obesity, recurrent hepatic encephalopathy that presents to the emergency department after being seen by his gastroenterologist for confusion and unsteadiness shortness of breath and a week of hematemesis patient evaluated emergency department was encephalopathic with elevated ammonia appeared volume overloaded patient with worsening anemia has been admitted to the hospitalist service GI consulted patient transfused 1 unit of packed red blood cell will likely need endoscopic evaluation started on ceftriaxone for decompensated cirrhosis continuing rifaximin and lactulose continue with Carafate adding IV PPI    Interval Followup: 2024    Has some insomnia.  Chronic pain diffuse. Reports itching.  Anxious today.  CT abdomen repeated last night.  Does not show any adverse sequela from the paracentesis  Creatinine stable 1.3  Ammonia down to 62  Tolerating lactulose 4 times daily ... Had BM earlier today  Blood pressures improved 122/60 range  Pancytopenic   white count 1.98 ---> 2.1  Hgb 7.0 ---> 7.5  Platelets 44 ---> 40    Objective   Objective     Vitals:   Temp:  [97.1 °F (36.2 °C)-97.9 °F (36.6 °C)] 97.3 °F (36.3 °C)  Heart Rate:  [] 66  Resp:  [16-18] 18  BP: ()/(49-73) 126/49  Physical Exam   Constitutional: Awake alert no acute distress.    Respiratory diminished  Cardiovascular RRR  GI: Abdomen obese, soft mildly tender distended.  Scattered ecchymosis  Extremities scattered bruising    Result Review    Result Review:  I have personally reviewed the pertinent results from the past 24 hours to  11/30/2024 09:29 EST and agree with these findings:  [x]  Laboratory   CBC          11/28/2024    05:22 11/29/2024    05:31 11/29/2024    18:37 11/30/2024    05:44   CBC   WBC 4.44  1.98  2.03  2.12    RBC 3.15  2.67  2.78  2.80    Hemoglobin 8.2  7.0  7.3  7.5    Hematocrit 26.8  22.4  24.0  24.5    MCV 85.1  83.9  86.3  87.5    MCH 26.0  26.2  26.3  26.8    MCHC 30.6  31.3  30.4  30.6    RDW 19.3  18.7  18.3  18.4    Platelets 66  41  39  40      BMP          11/28/2024    05:22 11/29/2024    05:31 11/29/2024    18:37 11/30/2024    05:44   BMP   BUN 24  25  25  24    Creatinine 1.40  1.41  1.51  1.34    Sodium 134  135  136  138    Potassium 4.0  4.1  4.1  4.1    Chloride 101  101  101  106    CO2 25.1  27.5  26.4  25.3    Calcium 8.6  8.3  8.2  7.9      LIVER FUNCTION TESTS:      Lab 11/30/24  0544 11/29/24  1837 11/29/24  0531 11/28/24  0522 11/27/24  0523 11/25/24  1818   TOTAL PROTEIN 5.3* 5.4* 5.4* 6.2 5.6* 6.3   ALBUMIN 2.9* 2.9* 2.9* 3.1* 2.8* 3.1*   GLOBULIN  --  2.5  --   --  2.8 3.2   ALT (SGPT) 16 17 17 21 19 22   AST (SGOT) 37 39 39 50* 43* 45*   BILIRUBIN 1.0 0.8 0.8 1.3* 1.7* 1.6*   INDIRECT BILIRUBIN 0.5  --  0.4 0.7  --   --    BILIRUBIN DIRECT 0.5*  --  0.4* 0.6* 0.7*  --    ALK PHOS 102 119* 111 128* 123* 142*   LIPASE  --   --   --   --   --  37       [x]  Microbiology   Microbiology Results (last 10 days)       Procedure Component Value - Date/Time    Body Fluid Culture - Body Fluid, Peritoneum [[id number]] Collected: 11/27/24 0957    Lab Status: Final result Specimen: Body Fluid from Peritoneum Updated: 11/30/24 0719     Body Fluid Culture No growth at 3 days     Gram Stain Moderate (3+) WBCs seen      No organisms seen    Anaerobic Culture - Body Fluid, Peritoneum [[id number]]  (Normal) Collected: 11/27/24 0957    Lab Status: Preliminary result Specimen: Body Fluid from Peritoneum Updated: 11/30/24 0806     Anaerobic Culture No anaerobes isolated at 3 days              [x]  Radiology CT Abdomen  Pelvis Without Contrast    Result Date: 11/29/2024  No significant interval change is seen since the prior CT study from earlier during the same day. No pneumoperitoneum is seen.    Portions of this note were completed with a voice recognition program.  Electronically Signed By-Herberth Arevalo MD On:11/29/2024 9:40 PM      CT Abdomen Pelvis Without Contrast    Addendum Date: 11/29/2024    ADDENDUM #1 By report, the site of paracentesis was in the anterior right mid abdomen. Tiny gas collections seen adjacent to the colon on series 3 images 103 through 115 may be extraluminal. I discussed findings with Mr. Goetz at 1215. I would recommend follow-up CT scan of the abdomen and pelvis be obtained after administration of oral contrast. I would recommend a delay of 4 hours following ministration of IV contrast to optimize evaluation of the colon. IV contrast would also be helpful. Electronically Signed: Yury Starks MD  11/29/2024 12:39 PM EST  Workstation ID: [id number] ORIGINAL REPORT: CT ABDOMEN PELVIS WO CONTRAST Date of Exam: 11/29/2024 11:18 AM EST Indication: Lower Abd pain. Comparison: CT abdomen and pelvis 11/25/2024 Technique: Axial CT images were obtained of the abdomen and pelvis without the administration of contrast. Reconstructed coronal and sagittal images were also obtained. Automated exposure control and iterative construction methods were used. Findings: The lung bases are clear. The liver has a nodular contour consistent with cirrhosis. The right liver lobe is relatively small. The gallbladder is not abnormally distended. No pancreatic or adrenal mass is evident. The spleen is enlarged, measuring 19 cm x 11 cm in greatest AP and  transverse dimension, which is not significantly changed. A small amount of peritoneal fluid is less in comparison to 11/25/2024. The patient is undergone interim paracentesis. Ill-defined increased density in subcutaneous soft tissues is consistent with anasarca. No renal or  ureteral stones are seen. There is no evidence of hydronephrosis. The urinary bladder is not abnormally distended. The prostate gland measures 5 cm in greatest transverse dimension. The stomach is not abnormally distended. Bowel loops are of normal caliber. Evaluation of solid organs and bowel is limited without contrast. Midline hernia defect in the epigastric region measures 2 cm in diameter. Abdominal fat herniating through the defect measures 5.5 cm. Small umbilical hernia containing fat and fluid is stable. Bilateral inguinal hernias are stable. Left inguinal adenopathy is evident, with lymph nodes measuring up to 2 cm in greatest short axis dimension. Degenerative changes are seen in the lower thoracic and lumbar spine. Hardware in the left hemipelvis is consistent with ORIF. Impression: 1.Cirrhotic liver. 2.Splenomegaly, not significantly changed. 3.Small amount of peritoneal fluid, less than on 11/25/2024. The patient is undergone interim paracentesis. Anasarca. 4.Mildly enlarged prostate gland. 5.Left inguinal adenopathy. Electronically Signed: Yury Starks MD  11/29/2024 11:39 AM EST  Workstation ID: [id number]    Result Date: 11/29/2024  Impression: 1.Cirrhotic liver. 2.Splenomegaly, not significantly changed. 3.Small amount of peritoneal fluid, less than on 11/25/2024. The patient is undergone interim paracentesis. Anasarca. 4.Mildly enlarged prostate gland. 5.Left inguinal adenopathy. Electronically Signed: Yury Starks MD  11/29/2024 11:39 AM EST  Workstation ID: [id number]    CT Head Without Contrast    Result Date: 11/29/2024  Impression: No acute intracranial process identified. Electronically Signed: Emanuel Dewey MD  11/29/2024 11:36 AM EST  Workstation ID: [id number]    US Paracentesis    Result Date: 11/27/2024  Impression: Successful ultrasound-guided diagnostic paracentesis without immediate complication.  Electronically Signed: Jimmy Jordan MD  11/27/2024 10:10 AM EST  Workstation ID:  [id number]    XR Chest 1 View    Result Date: 11/26/2024  Mild infiltrate or atelectasis at the right base. Electronically Signed: Kye Breaux MD  11/26/2024 5:47 AM EST  Workstation ID: [id number]    CT Abdomen Pelvis With Contrast    Result Date: 11/25/2024  Impression: 1.Small to moderate volume ascites most pronounced in the right upper and lower quadrants. 2.Cirrhosis with splenomegaly. 3.Cholelithiasis without gallbladder inflammatory changes. 4.Fat and fluid-containing umbilical hernia, similar to prior examination. 5.Increasing simple appearing fluid within the left inguinal canal now measuring 5.4 cm in diameter, likely ascites related. 6.Mild generalized body wall edema. Electronically Signed: Emanuel Dewey MD  11/25/2024 8:48 PM EST  Workstation ID: [id number]       []  EKG/Telemetry   No orders to display       []  Cardiology/Vascular   []  Pathology  [x]  Old records  []  Other:    Assessment & Plan   Assessment / Plan     Assessment:    Hematemesis / Suspect acute upper GI bleed  Acute on chronic anemia  Acute decompensated cirrhosis  Status post paracentesis 11/27/24.  130 cc.    Status post GD 11/27/24.  Bleeding treated with argon plasma coagulopathy.  ROMO  Acute on chronic diastolic congestive heart failure  Anasarca  Thrombocytopenia secondary to cirrhosis  Hepatic encephalopathy  CKD stage III  History of TBI  Splenomegaly  Diabetes  History of DVT  Nursing home long-term care resident: [facility]os  Bleeding portal hypertensive gastropathy in the setting of thrombocytopenia/elevated INR  Pancytopenia  Code Status: DNR    Plan:    Status post 1 unit PRBCs today.  Monitor CBC closely  Repeat CT abdomen does not show any adverse sequela from recent paracentesis   Appreciate general surgery consult.    Gastroenterology consultation.   Continue IV  Lasix 40 IV BID.    Monitor BP trend.  Continue Aldactone     Monitor BP trend.  Continue IV ceftriaxone for decompensated liver cirrhosis  Fluids/Na  restriction.  Continuing lactulose and rifaximin for hepatic encephalopathy  Continue basal, bolus, correction insulin  Wound care consultation  Disposition: Once medically stable, plan on returning to [place] (long-term resident)  Long-term prognosis poor.  Palliative care consult.     Discussed plan with RN.    VTE Prophylaxis:  Mechanical VTE prophylaxis orders are present.        CODE STATUS:   Medical Intervention Limits: No intubation (DNI)  Level Of Support Discussed With: Patient; Next of Kin (If No Surrogate)  Code Status (Patient has no pulse and is not breathing): No CPR (Do Not Attempt to Resuscitate)  Medical Interventions (Patient has pulse or is breathing): Limited Support         Attending documentation:  I reviewed the above documentation and independently reviewed and rounded and evaluated the patient and discussed the care plan with SANDOR Goetz PA-C, I agree with his findings and plan as documented, what I have added to the care plan and modified is as follows in my documentation and my medical decision making; 59-year-old male with history of TBI, sleep apnea, hypertension, history of DVT, diabetes, depression, liver cirrhosis with splenomegaly, anxiety, history of gastric ulcers with bleed, chronic diastolic CHF, hospitalized on 11/25/2024 with chief complaint of unsteadiness, confusion, shortness of breath, found to have an ammonia level of 69, acute anemia requiring blood transfusion, creatinine 1.28, sodium 134, coagulopathy due to liver disease, PRBC ordered for transfusion, IR consulted for paracentesis of ascites fluid, reported hematemesis for a week, seen in GI clinic, sent to the emergency room, status post EGD, found to have portal hypertensive gastropathy with oozing, treated with argon plasma coagulation.  Had worsening abdominal pain, scanned, CT abdomen pelvis initially was concerning for abnormal appearing bowel, rescanned with contrast, ruled out.  No pneumoperitoneum.  Interval follow-up: Patient seen and examined this morning, no acute distress, no acute major overnight events, mentation at baseline, abdominal pain improving.  Hemoglobin 7.5, white blood cell count up to 2000, platelets 40,000, ammonia down to 62, creatinine 1.34, BUN 24, potassium 4.1.  Review of systems obtained, all systems reviewed and negative except weakness and fatigue, intermittent confusion, abdominal bruising.  On physical exam elderly appearing male with very subtle confusion, sitting at the edge of the bed eating a hearty breakfast no acute distress, edematous, regular rate rhythm, diminished breath sounds, soft nontender abdomen with bruising, with distention, pallor, lower extremity edema.  Alert and oriented to self and surroundings.  Assessment as above, volume overloaded, acute anemia, concern for hematemesis prior to hospitalization, on EGD found to have acute oozing, likely etiology of acute blood loss anemia, history of GAVE, long-term nursing home resident, with other medical history as listed above with hyperammonemia and hepatic encephalopathy, status post EGD and argon plasma coagulation of oozing sites that were bleeding.  Plan, continue lactulose to 30 g 4 times a day, following blood counts, if his hemoglobin drops to less than 7, will need to transfuse 1 unit PRBC, continue Protonix to IV 40 mg twice daily for 72 hours, will switch to p.o. tomorrow, continue rifaximin 550 mg twice a day, continue Carafate, spironolactone, resume Lasix 40 mg IV twice daily, strict I's and O's, daily weights, no surgical intervention warranted at this time, general surgery recommendations appreciated, GI consulted, recommendations appreciated, continue Carafate, resume spironolactone, continue Coreg 25 mg twice a day, BuSpar 7.5 mg 3 times daily, insulin sliding scale coverage with Lantus 10 units nightly, insulin sliding scale coverage, out of bed to chair, PT/OT, a.m. labs, full code, DVT prophylaxis  with SCDs, clinical course dictate further management, More than 75 % of the time of this patient's encounter was performed by me, this included face-to-face time, planning and coordinating, medical decision making and critical thinking personally done by me.    Electronically signed by Bandar Burch MD, 11/30/2024, 09:29 EST.    Portions of this documentation were transcribed electronically from a voice recognition software.  I confirm all data accurately represents the service(s) I performed at today's visit.

## 2024-11-30 NOTE — PROGRESS NOTES
[facility]     Progress Note    Patient Name: Nic Nicolas  : 1965  MRN: [id number]  Primary Care Physician:  Sheldon Carcamo MD  Date of admission: 2024    Subjective   Subjective     Chief Complaint: Abdominal pain    HPI:  Patient Reports abdominal pain improving.  Patient was fairly somnolent this morning when I talked with him.      Objective   Objective     Vitals:   Temp:  [97.1 °F (36.2 °C)-97.9 °F (36.6 °C)] 97.3 °F (36.3 °C)  Heart Rate:  [] 66  Resp:  [16-18] 18  BP: ()/(49-73) 126/49    Physical Exam  Constitutional:       Appearance: Normal appearance.   Cardiovascular:      Rate and Rhythm: Normal rate.   Pulmonary:      Effort: Pulmonary effort is normal.   Abdominal:      Palpations: Abdomen is soft.         Result Review    Result Review:  I have personally reviewed the results from the time of this admission to 2024 10:14 EST and agree with these findings:  [x]  Laboratory  []  Microbiology  [x]  Radiology  []  EKG/Telemetry   []  Cardiology/Vascular   []  Pathology  []  Old records  []  Other:  Most notable findings include: White count stable; repeat CT shows no evidence of contrast extravasation or significant changes from prior CT    Assessment & Plan   Assessment / Plan     Brief Patient Summary:  Nic Nicolas is a 59 y.o. male who status post paracentesis with abdominal pain    Active Hospital Problems:  Active Hospital Problems    Diagnosis     **Hepatic encephalopathy     Chronic kidney disease     Chronic anemia     Anasarca     Stroke     Anxiety     Gastrointestinal hemorrhage associated with peptic ulcer     Sleep apnea     CHF (congestive heart failure)     Type 2 diabetes mellitus with hyperglycemia     Chronic low back pain      Plan:  No obvious signs of any intra-abdominal injury after paracentesis  If he begins develop more significant signs or symptoms please feel free to call back  Will sign off for now       VTE  Prophylaxis:  Mechanical VTE prophylaxis orders are present.        CODE STATUS:   Medical Intervention Limits: No intubation (DNI)  Level Of Support Discussed With: Patient; Next of Kin (If No Surrogate)  Code Status (Patient has no pulse and is not breathing): No CPR (Do Not Attempt to Resuscitate)  Medical Interventions (Patient has pulse or is breathing): Limited Support    Disposition:  I expect patient to be discharged unsure.    Electronically signed by Dominick Agarwal MD, 11/30/24, 10:14 AM EST.

## 2024-11-30 NOTE — PLAN OF CARE
Goal Outcome Evaluation:         Pt Aox4 throughout the shift, on room air and up ad-myrtle with the walker. Pt complaint of pain throughout the shift, medicated per MAR. IV ABX maintained per orders. Pt complaint of itchiness once this shift, medicated with PRN Atarax and skin care provided per orders. Pt did have a couple bouts of incontinence this shift. VSS, no other acute changes noted this shift. Pt appears to be in no apparent distress at this time, denies any current needs, and has call light within reach.

## 2024-12-01 LAB
ABO GROUP BLD: NORMAL
ALBUMIN SERPL-MCNC: 2.6 G/DL (ref 3.5–5.2)
ALP SERPL-CCNC: 97 U/L (ref 39–117)
ALT SERPL W P-5'-P-CCNC: 14 U/L (ref 1–41)
ANION GAP SERPL CALCULATED.3IONS-SCNC: 5.4 MMOL/L (ref 5–15)
ANISOCYTOSIS BLD QL: NORMAL
AST SERPL-CCNC: 30 U/L (ref 1–40)
BASOPHILS # BLD AUTO: 0.02 10*3/MM3 (ref 0–0.2)
BASOPHILS NFR BLD AUTO: 0.8 % (ref 0–1.5)
BILIRUB CONJ SERPL-MCNC: 0.4 MG/DL (ref 0–0.3)
BILIRUB FLD-MCNC: 0.2 MG/DL
BILIRUB INDIRECT SERPL-MCNC: 0.4 MG/DL
BILIRUB SERPL-MCNC: 0.8 MG/DL (ref 0–1.2)
BILIRUB UR QL STRIP: NEGATIVE
BLD GP AB SCN SERPL QL: NEGATIVE
BUN SERPL-MCNC: 26 MG/DL (ref 6–20)
BUN/CREAT SERPL: 21.7 (ref 7–25)
CALCIUM SPEC-SCNC: 8 MG/DL (ref 8.6–10.5)
CHLORIDE SERPL-SCNC: 107 MMOL/L (ref 98–107)
CLARITY UR: CLEAR
CO2 SERPL-SCNC: 26.6 MMOL/L (ref 22–29)
COLOR UR: YELLOW
CREAT SERPL-MCNC: 1.2 MG/DL (ref 0.76–1.27)
DACRYOCYTES BLD QL SMEAR: NORMAL
DEPRECATED RDW RBC AUTO: 58.3 FL (ref 37–54)
EGFRCR SERPLBLD CKD-EPI 2021: 70.1 ML/MIN/1.73
EOSINOPHIL # BLD AUTO: 0.06 10*3/MM3 (ref 0–0.4)
EOSINOPHIL NFR BLD AUTO: 2.5 % (ref 0.3–6.2)
ERYTHROCYTE [DISTWIDTH] IN BLOOD BY AUTOMATED COUNT: 18.5 % (ref 12.3–15.4)
GLUCOSE BLDC GLUCOMTR-MCNC: 178 MG/DL (ref 70–99)
GLUCOSE BLDC GLUCOMTR-MCNC: 196 MG/DL (ref 70–99)
GLUCOSE BLDC GLUCOMTR-MCNC: 211 MG/DL (ref 70–99)
GLUCOSE BLDC GLUCOMTR-MCNC: 249 MG/DL (ref 70–99)
GLUCOSE SERPL-MCNC: 207 MG/DL (ref 65–99)
GLUCOSE UR STRIP-MCNC: NEGATIVE MG/DL
HCT VFR BLD AUTO: 23.4 % (ref 37.5–51)
HCT VFR BLD AUTO: 28 % (ref 37.5–51)
HGB BLD-MCNC: 7 G/DL (ref 13–17.7)
HGB BLD-MCNC: 8.6 G/DL (ref 13–17.7)
HGB UR QL STRIP.AUTO: NEGATIVE
IMM GRANULOCYTES # BLD AUTO: 0.01 10*3/MM3 (ref 0–0.05)
IMM GRANULOCYTES NFR BLD AUTO: 0.4 % (ref 0–0.5)
KETONES UR QL STRIP: NEGATIVE
LEUKOCYTE ESTERASE UR QL STRIP.AUTO: NEGATIVE
LYMPHOCYTES # BLD AUTO: 0.49 10*3/MM3 (ref 0.7–3.1)
LYMPHOCYTES NFR BLD AUTO: 20.8 % (ref 19.6–45.3)
MACROCYTES BLD QL SMEAR: NORMAL
MAGNESIUM SERPL-MCNC: 1.9 MG/DL (ref 1.6–2.6)
MCH RBC QN AUTO: 26.1 PG (ref 26.6–33)
MCHC RBC AUTO-ENTMCNC: 29.9 G/DL (ref 31.5–35.7)
MCV RBC AUTO: 87.3 FL (ref 79–97)
MICROCYTES BLD QL: NORMAL
MONOCYTES # BLD AUTO: 0.21 10*3/MM3 (ref 0.1–0.9)
MONOCYTES NFR BLD AUTO: 8.9 % (ref 5–12)
NEUTROPHILS NFR BLD AUTO: 1.57 10*3/MM3 (ref 1.7–7)
NEUTROPHILS NFR BLD AUTO: 66.6 % (ref 42.7–76)
NITRITE UR QL STRIP: NEGATIVE
NRBC BLD AUTO-RTO: 0 /100 WBC (ref 0–0.2)
OVALOCYTES BLD QL SMEAR: NORMAL
PH UR STRIP.AUTO: 5.5 [PH] (ref 5–8)
PHOSPHATE SERPL-MCNC: 1.8 MG/DL (ref 2.5–4.5)
PLAT MORPH BLD: NORMAL
PLATELET # BLD AUTO: 42 10*3/MM3 (ref 140–450)
POTASSIUM SERPL-SCNC: 4.1 MMOL/L (ref 3.5–5.2)
PROT SERPL-MCNC: 5 G/DL (ref 6–8.5)
PROT UR QL STRIP: NEGATIVE
RBC # BLD AUTO: 2.68 10*6/MM3 (ref 4.14–5.8)
RH BLD: POSITIVE
SODIUM SERPL-SCNC: 139 MMOL/L (ref 136–145)
SP GR UR STRIP: 1.02 (ref 1–1.03)
T&S EXPIRATION DATE: NORMAL
UROBILINOGEN UR QL STRIP: NORMAL
WBC MORPH BLD: NORMAL
WBC NRBC COR # BLD AUTO: 2.36 10*3/MM3 (ref 3.4–10.8)

## 2024-12-01 PROCEDURE — 81003 URINALYSIS AUTO W/O SCOPE: CPT | Performed by: PHYSICIAN ASSISTANT

## 2024-12-01 PROCEDURE — 85025 COMPLETE CBC W/AUTO DIFF WBC: CPT | Performed by: FAMILY MEDICINE

## 2024-12-01 PROCEDURE — 80048 BASIC METABOLIC PNL TOTAL CA: CPT | Performed by: FAMILY MEDICINE

## 2024-12-01 PROCEDURE — 86923 COMPATIBILITY TEST ELECTRIC: CPT

## 2024-12-01 PROCEDURE — 83735 ASSAY OF MAGNESIUM: CPT | Performed by: FAMILY MEDICINE

## 2024-12-01 PROCEDURE — 85007 BL SMEAR W/DIFF WBC COUNT: CPT | Performed by: FAMILY MEDICINE

## 2024-12-01 PROCEDURE — 36430 TRANSFUSION BLD/BLD COMPNT: CPT

## 2024-12-01 PROCEDURE — 86900 BLOOD TYPING SEROLOGIC ABO: CPT | Performed by: PHYSICIAN ASSISTANT

## 2024-12-01 PROCEDURE — 25010000002 PIPERACILLIN SOD-TAZOBACTAM PER 1 G: Performed by: FAMILY MEDICINE

## 2024-12-01 PROCEDURE — 99232 SBSQ HOSP IP/OBS MODERATE 35: CPT | Performed by: FAMILY MEDICINE

## 2024-12-01 PROCEDURE — 84100 ASSAY OF PHOSPHORUS: CPT | Performed by: FAMILY MEDICINE

## 2024-12-01 PROCEDURE — 82948 REAGENT STRIP/BLOOD GLUCOSE: CPT

## 2024-12-01 PROCEDURE — P9016 RBC LEUKOCYTES REDUCED: HCPCS

## 2024-12-01 PROCEDURE — 25010000002 MORPHINE PER 10 MG: Performed by: INTERNAL MEDICINE

## 2024-12-01 PROCEDURE — 80076 HEPATIC FUNCTION PANEL: CPT | Performed by: FAMILY MEDICINE

## 2024-12-01 PROCEDURE — 86900 BLOOD TYPING SEROLOGIC ABO: CPT

## 2024-12-01 PROCEDURE — 99232 SBSQ HOSP IP/OBS MODERATE 35: CPT | Performed by: INTERNAL MEDICINE

## 2024-12-01 PROCEDURE — 86901 BLOOD TYPING SEROLOGIC RH(D): CPT | Performed by: PHYSICIAN ASSISTANT

## 2024-12-01 PROCEDURE — 86850 RBC ANTIBODY SCREEN: CPT | Performed by: PHYSICIAN ASSISTANT

## 2024-12-01 PROCEDURE — 85018 HEMOGLOBIN: CPT | Performed by: FAMILY MEDICINE

## 2024-12-01 PROCEDURE — 85014 HEMATOCRIT: CPT | Performed by: FAMILY MEDICINE

## 2024-12-01 PROCEDURE — 25010000002 FUROSEMIDE PER 20 MG: Performed by: FAMILY MEDICINE

## 2024-12-01 PROCEDURE — 82948 REAGENT STRIP/BLOOD GLUCOSE: CPT | Performed by: INTERNAL MEDICINE

## 2024-12-01 PROCEDURE — 63710000001 INSULIN LISPRO (HUMAN) PER 5 UNITS: Performed by: INTERNAL MEDICINE

## 2024-12-01 PROCEDURE — 63710000001 INSULIN GLARGINE PER 5 UNITS: Performed by: INTERNAL MEDICINE

## 2024-12-01 RX ORDER — PANTOPRAZOLE SODIUM 40 MG/1
40 TABLET, DELAYED RELEASE ORAL
Status: DISCONTINUED | OUTPATIENT
Start: 2024-12-01 | End: 2024-12-09 | Stop reason: HOSPADM

## 2024-12-01 RX ADMIN — BACITRACIN 0.9 G: 500 OINTMENT TOPICAL at 17:37

## 2024-12-01 RX ADMIN — DICLOFENAC SODIUM 2 G: 10 GEL TOPICAL at 20:36

## 2024-12-01 RX ADMIN — Medication 250 MG: at 20:34

## 2024-12-01 RX ADMIN — BACITRACIN 0.9 G: 500 OINTMENT TOPICAL at 08:25

## 2024-12-01 RX ADMIN — FUROSEMIDE 40 MG: 10 INJECTION, SOLUTION INTRAMUSCULAR; INTRAVENOUS at 17:43

## 2024-12-01 RX ADMIN — INSULIN LISPRO 6 UNITS: 100 INJECTION, SOLUTION INTRAVENOUS; SUBCUTANEOUS at 17:38

## 2024-12-01 RX ADMIN — PANTOPRAZOLE SODIUM 40 MG: 40 TABLET, DELAYED RELEASE ORAL at 17:37

## 2024-12-01 RX ADMIN — RIFAXIMIN 550 MG: 550 TABLET ORAL at 03:21

## 2024-12-01 RX ADMIN — POTASSIUM & SODIUM PHOSPHATES POWDER PACK 280-160-250 MG 1 PACKET: 280-160-250 PACK at 08:24

## 2024-12-01 RX ADMIN — RIFAXIMIN 550 MG: 550 TABLET ORAL at 17:37

## 2024-12-01 RX ADMIN — MORPHINE SULFATE 2 MG: 2 INJECTION, SOLUTION INTRAMUSCULAR; INTRAVENOUS at 20:34

## 2024-12-01 RX ADMIN — HYDROXYZINE HYDROCHLORIDE 25 MG: 25 TABLET ORAL at 20:34

## 2024-12-01 RX ADMIN — SERTRALINE HYDROCHLORIDE 100 MG: 100 TABLET ORAL at 20:34

## 2024-12-01 RX ADMIN — Medication 10 ML: at 08:26

## 2024-12-01 RX ADMIN — OXYCODONE 10 MG: 5 TABLET ORAL at 11:46

## 2024-12-01 RX ADMIN — INSULIN LISPRO 4 UNITS: 100 INJECTION, SOLUTION INTRAVENOUS; SUBCUTANEOUS at 20:33

## 2024-12-01 RX ADMIN — LACTULOSE 30 G: 20 SOLUTION ORAL at 11:44

## 2024-12-01 RX ADMIN — FUROSEMIDE 40 MG: 10 INJECTION, SOLUTION INTRAMUSCULAR; INTRAVENOUS at 06:10

## 2024-12-01 RX ADMIN — SUCRALFATE 1 G: 1 TABLET ORAL at 11:46

## 2024-12-01 RX ADMIN — LEVETIRACETAM 500 MG: 500 TABLET, FILM COATED ORAL at 20:34

## 2024-12-01 RX ADMIN — PIPERACILLIN AND TAZOBACTAM 4.5 G: 4; .5 INJECTION, POWDER, FOR SOLUTION INTRAVENOUS; PARENTERAL at 20:33

## 2024-12-01 RX ADMIN — BUSPIRONE HYDROCHLORIDE 7.5 MG: 7.5 TABLET ORAL at 08:24

## 2024-12-01 RX ADMIN — INSULIN LISPRO 2 UNITS: 100 INJECTION, SOLUTION INTRAVENOUS; SUBCUTANEOUS at 11:46

## 2024-12-01 RX ADMIN — PANTOPRAZOLE SODIUM 40 MG: 40 INJECTION, POWDER, FOR SOLUTION INTRAVENOUS at 08:25

## 2024-12-01 RX ADMIN — BACITRACIN 0.9 G: 500 OINTMENT TOPICAL at 20:33

## 2024-12-01 RX ADMIN — CARVEDILOL 25 MG: 25 TABLET, FILM COATED ORAL at 08:25

## 2024-12-01 RX ADMIN — CETIRIZINE HYDROCHLORIDE 5 MG: 10 TABLET, FILM COATED ORAL at 20:34

## 2024-12-01 RX ADMIN — HYDROCORTISONE ACETATE 1 APPLICATION: 1 CREAM TOPICAL at 11:47

## 2024-12-01 RX ADMIN — LEVETIRACETAM 500 MG: 500 TABLET, FILM COATED ORAL at 08:25

## 2024-12-01 RX ADMIN — CARVEDILOL 25 MG: 25 TABLET, FILM COATED ORAL at 17:37

## 2024-12-01 RX ADMIN — SUCRALFATE 1 G: 1 TABLET ORAL at 17:42

## 2024-12-01 RX ADMIN — BUSPIRONE HYDROCHLORIDE 7.5 MG: 7.5 TABLET ORAL at 20:34

## 2024-12-01 RX ADMIN — PIPERACILLIN AND TAZOBACTAM 4.5 G: 4; .5 INJECTION, POWDER, FOR SOLUTION INTRAVENOUS; PARENTERAL at 06:10

## 2024-12-01 RX ADMIN — ROPINIROLE HYDROCHLORIDE 1 MG: 1 TABLET, FILM COATED ORAL at 20:34

## 2024-12-01 RX ADMIN — BUSPIRONE HYDROCHLORIDE 7.5 MG: 7.5 TABLET ORAL at 17:37

## 2024-12-01 RX ADMIN — INSULIN GLARGINE 10 UNITS: 100 INJECTION, SOLUTION SUBCUTANEOUS at 20:34

## 2024-12-01 RX ADMIN — LACTULOSE 30 G: 20 SOLUTION ORAL at 08:25

## 2024-12-01 RX ADMIN — MORPHINE SULFATE 2 MG: 2 INJECTION, SOLUTION INTRAMUSCULAR; INTRAVENOUS at 04:36

## 2024-12-01 RX ADMIN — Medication 250 MG: at 08:25

## 2024-12-01 RX ADMIN — POTASSIUM & SODIUM PHOSPHATES POWDER PACK 280-160-250 MG 1 PACKET: 280-160-250 PACK at 17:38

## 2024-12-01 RX ADMIN — DICLOFENAC SODIUM 2 G: 10 GEL TOPICAL at 11:47

## 2024-12-01 RX ADMIN — Medication 10 ML: at 20:35

## 2024-12-01 RX ADMIN — OXYCODONE 10 MG: 5 TABLET ORAL at 03:22

## 2024-12-01 RX ADMIN — SUCRALFATE 1 G: 1 TABLET ORAL at 08:25

## 2024-12-01 RX ADMIN — HYDROCORTISONE ACETATE 1 APPLICATION: 1 CREAM TOPICAL at 20:36

## 2024-12-01 RX ADMIN — SPIRONOLACTONE 100 MG: 25 TABLET ORAL at 08:25

## 2024-12-01 RX ADMIN — SUCRALFATE 1 G: 1 TABLET ORAL at 20:34

## 2024-12-01 RX ADMIN — INSULIN LISPRO 2 UNITS: 100 INJECTION, SOLUTION INTRAVENOUS; SUBCUTANEOUS at 08:25

## 2024-12-01 NOTE — PLAN OF CARE
Goal Outcome Evaluation:  Patient alert and oriented, vital signs stable. Patient received 1unit PRBC this shift, tolerated well. Complains of generalized pain 5/10, PRN pain medications given as ordered. Patient able to ambulate on own with use of walker. Skin care performed as ordered. Patient refused lactulose twice today due to multiple loose bowel movements.

## 2024-12-01 NOTE — PROGRESS NOTES
[facility]  Inpatient Progress Note    Reason for Follow Up: Cirrhosis, acute on chronic anemia secondary portal hypertensive gastropathy    Subjective     Interval History:   Resting comfortably no events overnight  Hemoglobin down to 7.0    Current Facility-Administered Medications:     artificial tears ophthalmic ointment, , Both Eyes, Q1H PRN, Karlos Roblero MD, Given at 11/28/24 0517    bacitracin 500 UNIT/GM ointment 0.9 g, 1 Application, Topical, TID, Karlos Roblero MD, 0.9 g at 12/01/24 0825    sennosides-docusate (PERICOLACE) 8.6-50 MG per tablet 2 tablet, 2 tablet, Oral, BID PRN **AND** polyethylene glycol (MIRALAX) packet 17 g, 17 g, Oral, Daily PRN **AND** bisacodyl (DULCOLAX) EC tablet 5 mg, 5 mg, Oral, Daily PRN, 5 mg at 11/29/24 0917 **AND** bisacodyl (DULCOLAX) suppository 10 mg, 10 mg, Rectal, Daily PRN, Karlos Roblero MD    busPIRone (BUSPAR) tablet 7.5 mg, 7.5 mg, Oral, TID, Karlos Roblero MD, 7.5 mg at 12/01/24 0824    carvedilol (COREG) tablet 25 mg, 25 mg, Oral, BID With Meals, Karlos Roblero MD, 25 mg at 12/01/24 0825    cetirizine (zyrTEC) tablet 5 mg, 5 mg, Oral, Nightly, Karlos Roblero MD, 5 mg at 11/30/24 2041    dextrose (D50W) (25 g/50 mL) IV injection 25 g, 25 g, Intravenous, Q15 Min PRN, Karlos Roblero MD    dextrose (GLUTOSE) oral gel 15 g, 15 g, Oral, Q15 Min PRN, Karlos Roblero MD    Diclofenac Sodium (VOLTAREN) 1 % gel 2 g, 2 g, Topical, 4x Daily, Dominick Enamorado MD, 2 g at 12/01/24 1147    fluticasone (FLONASE) 50 MCG/ACT nasal spray 1 spray, 1 spray, Nasal, Daily, Karlos Roblero MD, 1 spray at 11/30/24 0918    furosemide (LASIX) injection 40 mg, 40 mg, Intravenous, Q12H, Bandar Burch MD, 40 mg at 12/01/24 0610    glucagon (GLUCAGEN) injection 1 mg, 1 mg, Intramuscular, Q15 Min PRN, Karlos Roblero MD    hydrocortisone 1 % cream 1 Application, 1 Application, Topical, Q12H,  Karlos Roblero MD, 1 Application at 12/01/24 1147    hydrOXYzine (ATARAX) tablet 25 mg, 25 mg, Oral, Q8H PRN, Karlos Roblero MD, 25 mg at 11/30/24 2042    insulin glargine (LANTUS, SEMGLEE) injection 10 Units, 10 Units, Subcutaneous, Nightly, Karlos Roblero MD, 10 Units at 11/30/24 2042    Insulin Lispro (humaLOG) injection 2-9 Units, 2-9 Units, Subcutaneous, 4x Daily AC & at Bedtime, Karlos Roblero MD, 2 Units at 12/01/24 1146    lactulose (CHRONULAC) 10 GM/15ML solution 30 g, 30 g, Oral, 4x Daily, Karlos Roblero MD, 30 g at 12/01/24 1144    levETIRAcetam (KEPPRA) tablet 500 mg, 500 mg, Oral, BID, Karlos Roblero MD, 500 mg at 12/01/24 0825    morphine injection 2 mg, 2 mg, Intravenous, Q4H PRN, Karlos Roblero MD, 2 mg at 12/01/24 0436    ondansetron (ZOFRAN) injection 4 mg, 4 mg, Intravenous, Q6H PRN, Karlos Roblero MD, 4 mg at 11/29/24 1044    oxyCODONE (ROXICODONE) immediate release tablet 10 mg, 10 mg, Oral, Q8H PRN, Karlos Roblero MD, 10 mg at 12/01/24 1146    pantoprazole (PROTONIX) EC tablet 40 mg, 40 mg, Oral, BID AC, Bandar Burch MD    piperacillin-tazobactam (ZOSYN) IVPB 4.5 g IVPB in 100 mL NS (VTB), 4.5 g, Intravenous, Q8H, Bandar Burch MD, 4.5 g at 12/01/24 0610    potassium & sodium phosphates (PHOS-NAK) 280-160-250 MG packet 1 packet, 1 packet, Oral, BID Marcin CHRISTIANSON Anesh V, MD, 1 packet at 12/01/24 0824    riFAXIMin (XIFAXAN) tablet 550 mg, 550 mg, Oral, Q12H, Bandar Burch MD, 550 mg at 12/01/24 0321    rOPINIRole (REQUIP) tablet 1 mg, 1 mg, Oral, Nightly, Karlos Roblero MD, 1 mg at 11/30/24 2041    saccharomyces boulardii (FLORASTOR) capsule 250 mg, 250 mg, Oral, BID, Karlos Roblero MD, 250 mg at 12/01/24 0825    sertraline (ZOLOFT) tablet 100 mg, 100 mg, Oral, Nightly, Karlos Roblero MD, 100 mg at 11/30/24 2042    sodium chloride 0.9 % flush 10 mL, 10 mL, Intravenous, PRN, Osbaldo  Karlos Tellez MD    sodium chloride 0.9 % flush 10 mL, 10 mL, Intravenous, Q12H, Karlos Roblero MD, 10 mL at 12/01/24 0826    sodium chloride 0.9 % flush 10 mL, 10 mL, Intravenous, PRN, Karlos Roblero MD    sodium chloride 0.9 % infusion 40 mL, 40 mL, Intravenous, PRN, Karlos Roblero MD    spironolactone (ALDACTONE) tablet 100 mg, 100 mg, Oral, Daily, Bandar Burch MD, 100 mg at 12/01/24 0825    sucralfate (CARAFATE) tablet 1 g, 1 g, Oral, 4x Daily, Karlos Roblero MD, 1 g at 12/01/24 1146  Review of Systems:    All systems were reviewed and negative except for that previously mentioned in the HPI    Objective     Vital Signs  Temp:  [97.3 °F (36.3 °C)-98.7 °F (37.1 °C)] 98.2 °F (36.8 °C)  Heart Rate:  [65-72] 72  Resp:  [18-20] 18  BP: (116-136)/(42-66) 131/57  Body mass index is 41.44 kg/m².    Intake/Output Summary (Last 24 hours) at 12/1/2024 1153  Last data filed at 12/1/2024 0940  Gross per 24 hour   Intake 720 ml   Output --   Net 720 ml     I/O this shift:  In: 240 [P.O.:240]  Out: -      Physical Exam:   General: patient awake, alert and cooperative   Eyes: Normal lids and lashes, no scleral icterus   Neck: supple, normal ROM   Skin: warm and dry, not jaundiced   Cardiovascular: regular rhythm and rate, no murmurs auscultated   Pulm: clear to auscultation bilaterally, regular and unlabored   Abdomen: soft, nontender, nondistended; normal bowel sounds   Rectal: deferred   Extremities: no rash or edema   Psychiatric: Normal mood and behavior; memory intact     Results Review:     I reviewed the patient's new clinical results.    Results from last 7 days   Lab Units 12/01/24  0557 11/30/24  0544 11/29/24  1837   WBC 10*3/mm3 2.36* 2.12* 2.03*   HEMOGLOBIN g/dL 7.0* 7.5* 7.3*   HEMATOCRIT % 23.4* 24.5* 24.0*   PLATELETS 10*3/mm3 42* 40* 39*     Results from last 7 days   Lab Units 12/01/24  0557 11/30/24  0544 11/29/24  1837   SODIUM mmol/L 139 138 136   POTASSIUM mmol/L 4.1  4.1 4.1   CHLORIDE mmol/L 107 106 101   CO2 mmol/L 26.6 25.3 26.4   BUN mg/dL 26* 24* 25*   CREATININE mg/dL 1.20 1.34* 1.51*   CALCIUM mg/dL 8.0* 7.9* 8.2*   BILIRUBIN mg/dL 0.8 1.0 0.8   ALK PHOS U/L 97 102 119*   ALT (SGPT) U/L 14 16 17   AST (SGOT) U/L 30 37 39   GLUCOSE mg/dL 207* 159* 211*     Results from last 7 days   Lab Units 11/29/24  0531 11/27/24  0523 11/26/24  1205   INR  2.06* 2.16* 2.11*     Lab Results   Lab Value Date/Time    LIPASE 37 11/25/2024 1818    LIPASE 33 08/05/2024 1150    LIPASE 35 12/01/2023 1204    LIPASE 42 05/18/2023 1433    LIPASE 63 (H) 03/24/2023 1040    LIPASE 38 02/05/2023 1532    LIPASE 40 12/18/2022 0238    LIPASE 55 11/04/2022 1731    LIPASE 68 (H) 10/31/2022 0924    LIPASE 52 06/27/2022 1449    LIPASE 20 08/03/2021 1206    LIPASE 28 06/01/2021 1622       Radiology:  CT Abdomen Pelvis Without Contrast    Result Date: 11/29/2024  No significant interval change is seen since the prior CT study from earlier during the same day. No pneumoperitoneum is seen.    Portions of this note were completed with a voice recognition program.  Electronically Signed By-Herberth Arevalo MD On:11/29/2024 9:40 PM           Assessment:       Hepatic encephalopathy    Sleep apnea    CHF (congestive heart failure)    Chronic low back pain    Type 2 diabetes mellitus with hyperglycemia    Gastrointestinal hemorrhage associated with peptic ulcer    Anxiety    Stroke    Anasarca    Chronic anemia    Chronic kidney disease       Plan:   Agree with transfusion  Continue PPI  Agree with palliative care consult, consideration of hospice would be appropriate.  Would not be a transplant candidate due to other medical problems.  He does have a sister I believe sometimes involved with his care    I discussed the patients findings and my recommendations with patient.    Karlos Roblero M.D.  Gastroenterology

## 2024-12-01 NOTE — PROGRESS NOTES
[facility]   Hospitalist Progress Note  Date: 2024  Patient Name: Nic Nicolas  : 1965  MRN: [id number]  Date of admission: 2024      Subjective   Subjective     Chief Complaint: Shortness of breath confusion hematemesis    Summary: Patient is a 59-year-old male past medical history significant for cirrhosis, TBI, diabetes, hypertension, hyperlipidemia, CHF, CKD, asthma, obesity, recurrent hepatic encephalopathy that presents to the emergency department after being seen by his gastroenterologist for confusion and unsteadiness shortness of breath and a week of hematemesis patient evaluated emergency department was encephalopathic with elevated ammonia appeared volume overloaded patient with worsening anemia has been admitted to the hospitalist service GI consulted patient transfused 1 unit of packed red blood cell will likely need endoscopic evaluation started on ceftriaxone for decompensated cirrhosis continuing rifaximin and lactulose continue with Carafate adding IV PPI    Interval Followup: 2024    Resting comfortably.  No new complaints today.  Blood pressures improved   Hgb 7.0  Glucose control  Morning creatinine 1.2  Platelets stable, running in the 40s range    Objective   Objective     Vitals:   Temp:  [97.3 °F (36.3 °C)-98.7 °F (37.1 °C)] 98.2 °F (36.8 °C)  Heart Rate:  [65-72] 72  Resp:  [18-20] 18  BP: (116-136)/(42-66) 131/57  Physical Exam   Constitutional: Awake alert no acute distress.    Respiratory diminished  Cardiovascular RRR  GI: Abdomen obese, soft mildly tender distended.  Scattered ecchymosis  Extremities scattered bruising    Result Review    Result Review:  I have personally reviewed the pertinent results from the past 24 hours to 2024 11:18 EST and agree with these findings:  [x]  Laboratory   CBC          2024    05:31 2024    18:37 2024    05:44 2024    05:57   CBC   WBC 1.98  2.03  2.12  2.36    RBC 2.67  2.78  2.80  2.68     Hemoglobin 7.0  7.3  7.5  7.0    Hematocrit 22.4  24.0  24.5  23.4    MCV 83.9  86.3  87.5  87.3    MCH 26.2  26.3  26.8  26.1    MCHC 31.3  30.4  30.6  29.9    RDW 18.7  18.3  18.4  18.5    Platelets 41  39  40  42      BMP          11/29/2024    05:31 11/29/2024    18:37 11/30/2024    05:44 12/1/2024    05:57   BMP   BUN 25  25  24  26    Creatinine 1.41  1.51  1.34  1.20    Sodium 135  136  138  139    Potassium 4.1  4.1  4.1  4.1    Chloride 101  101  106  107    CO2 27.5  26.4  25.3  26.6    Calcium 8.3  8.2  7.9  8.0      LIVER FUNCTION TESTS:      Lab 12/01/24  0557 11/30/24  0544 11/29/24  1837 11/29/24  0531 11/28/24  0522 11/27/24  0523 11/25/24  1818   TOTAL PROTEIN 5.0* 5.3* 5.4* 5.4* 6.2 5.6* 6.3   ALBUMIN 2.6* 2.9* 2.9* 2.9* 3.1* 2.8* 3.1*   GLOBULIN  --   --  2.5  --   --  2.8 3.2   ALT (SGPT) 14 16 17 17 21 19 22   AST (SGOT) 30 37 39 39 50* 43* 45*   BILIRUBIN 0.8 1.0 0.8 0.8 1.3* 1.7* 1.6*   INDIRECT BILIRUBIN 0.4 0.5  --  0.4 0.7  --   --    BILIRUBIN DIRECT 0.4* 0.5*  --  0.4* 0.6* 0.7*  --    ALK PHOS 97 102 119* 111 128* 123* 142*   LIPASE  --   --   --   --   --   --  37       [x]  Microbiology   Microbiology Results (last 10 days)       Procedure Component Value - Date/Time    Body Fluid Culture - Body Fluid, Peritoneum [[id number]] Collected: 11/27/24 0957    Lab Status: Final result Specimen: Body Fluid from Peritoneum Updated: 11/30/24 0719     Body Fluid Culture No growth at 3 days     Gram Stain Moderate (3+) WBCs seen      No organisms seen    Anaerobic Culture - Body Fluid, Peritoneum [[id number]]  (Normal) Collected: 11/27/24 0957    Lab Status: Preliminary result Specimen: Body Fluid from Peritoneum Updated: 11/30/24 0806     Anaerobic Culture No anaerobes isolated at 3 days              [x]  Radiology CT Abdomen Pelvis Without Contrast    Result Date: 11/29/2024  No significant interval change is seen since the prior CT study from earlier during the same day. No pneumoperitoneum is  seen.    Portions of this note were completed with a voice recognition program.  Electronically Signed By-Herberth Arevalo MD On:11/29/2024 9:40 PM      CT Abdomen Pelvis Without Contrast    Addendum Date: 11/29/2024    ADDENDUM #1 By report, the site of paracentesis was in the anterior right mid abdomen. Tiny gas collections seen adjacent to the colon on series 3 images 103 through 115 may be extraluminal. I discussed findings with Mr. Goetz at 1215. I would recommend follow-up CT scan of the abdomen and pelvis be obtained after administration of oral contrast. I would recommend a delay of 4 hours following ministration of IV contrast to optimize evaluation of the colon. IV contrast would also be helpful. Electronically Signed: Yury Starks MD  11/29/2024 12:39 PM EST  Workstation ID: [id number] ORIGINAL REPORT: CT ABDOMEN PELVIS WO CONTRAST Date of Exam: 11/29/2024 11:18 AM EST Indication: Lower Abd pain. Comparison: CT abdomen and pelvis 11/25/2024 Technique: Axial CT images were obtained of the abdomen and pelvis without the administration of contrast. Reconstructed coronal and sagittal images were also obtained. Automated exposure control and iterative construction methods were used. Findings: The lung bases are clear. The liver has a nodular contour consistent with cirrhosis. The right liver lobe is relatively small. The gallbladder is not abnormally distended. No pancreatic or adrenal mass is evident. The spleen is enlarged, measuring 19 cm x 11 cm in greatest AP and  transverse dimension, which is not significantly changed. A small amount of peritoneal fluid is less in comparison to 11/25/2024. The patient is undergone interim paracentesis. Ill-defined increased density in subcutaneous soft tissues is consistent with anasarca. No renal or ureteral stones are seen. There is no evidence of hydronephrosis. The urinary bladder is not abnormally distended. The prostate gland measures 5 cm in greatest transverse  dimension. The stomach is not abnormally distended. Bowel loops are of normal caliber. Evaluation of solid organs and bowel is limited without contrast. Midline hernia defect in the epigastric region measures 2 cm in diameter. Abdominal fat herniating through the defect measures 5.5 cm. Small umbilical hernia containing fat and fluid is stable. Bilateral inguinal hernias are stable. Left inguinal adenopathy is evident, with lymph nodes measuring up to 2 cm in greatest short axis dimension. Degenerative changes are seen in the lower thoracic and lumbar spine. Hardware in the left hemipelvis is consistent with ORIF. Impression: 1.Cirrhotic liver. 2.Splenomegaly, not significantly changed. 3.Small amount of peritoneal fluid, less than on 11/25/2024. The patient is undergone interim paracentesis. Anasarca. 4.Mildly enlarged prostate gland. 5.Left inguinal adenopathy. Electronically Signed: Yury Starks MD  11/29/2024 11:39 AM EST  Workstation ID: [id number]    Result Date: 11/29/2024  Impression: 1.Cirrhotic liver. 2.Splenomegaly, not significantly changed. 3.Small amount of peritoneal fluid, less than on 11/25/2024. The patient is undergone interim paracentesis. Anasarca. 4.Mildly enlarged prostate gland. 5.Left inguinal adenopathy. Electronically Signed: Yury Starks MD  11/29/2024 11:39 AM EST  Workstation ID: [id number]    CT Head Without Contrast    Result Date: 11/29/2024  Impression: No acute intracranial process identified. Electronically Signed: Emanuel Dewey MD  11/29/2024 11:36 AM EST  Workstation ID: [id number]    US Paracentesis    Result Date: 11/27/2024  Impression: Successful ultrasound-guided diagnostic paracentesis without immediate complication.  Electronically Signed: Jimmy Jordan MD  11/27/2024 10:10 AM EST  Workstation ID: [id number]    XR Chest 1 View    Result Date: 11/26/2024  Mild infiltrate or atelectasis at the right base. Electronically Signed: Kye Breaux MD  11/26/2024 5:47 AM EST   Workstation ID: [id number]    CT Abdomen Pelvis With Contrast    Result Date: 11/25/2024  Impression: 1.Small to moderate volume ascites most pronounced in the right upper and lower quadrants. 2.Cirrhosis with splenomegaly. 3.Cholelithiasis without gallbladder inflammatory changes. 4.Fat and fluid-containing umbilical hernia, similar to prior examination. 5.Increasing simple appearing fluid within the left inguinal canal now measuring 5.4 cm in diameter, likely ascites related. 6.Mild generalized body wall edema. Electronically Signed: Emanuel Dewey MD  11/25/2024 8:48 PM EST  Workstation ID: [id number]       []  EKG/Telemetry   No orders to display       []  Cardiology/Vascular   []  Pathology  [x]  Old records  []  Other:    Assessment & Plan   Assessment / Plan     Assessment:    Hematemesis / Suspect acute upper GI bleed  Acute on chronic anemia  Acute decompensated cirrhosis  Status post paracentesis 11/27/24.  130 cc.    Status post GD 11/27/24.  Bleeding treated with argon plasma coagulopathy.  ROMO  Acute on chronic diastolic congestive heart failure  Anasarca  Thrombocytopenia secondary to cirrhosis  Hepatic encephalopathy  CKD stage III  History of TBI  Splenomegaly  Diabetes  History of DVT  Nursing home long-term care resident: [facility]  Bleeding portal hypertensive gastropathy in the setting of thrombocytopenia/elevated INR  Pancytopenia  Code Status: DNR    Plan:    Give 1 unit PRBCs.  Monitor CBC  Continue Lasix and Aldactone.  Monitor BP.  Repeat CT abdomen does not show any adverse sequela from recent paracentesis   Appreciate general surgery consult.    Gastroenterology consultation.   Continue IV ceftriaxone for decompensated liver cirrhosis  Fluids/Na restriction.  Continuing lactulose and rifaximin for hepatic encephalopathy  Continue basal, bolus, correction insulin  Wound care consultation  Disposition: Once medically stable, plan on returning to [facility] (long-term resident)  Long-term  prognosis poor.  Palliative care consult.     Discussed plan with RN.    VTE Prophylaxis:  Mechanical VTE prophylaxis orders are present.        CODE STATUS:   Medical Intervention Limits: No intubation (DNI)  Level Of Support Discussed With: Patient; Next of Kin (If No Surrogate)  Code Status (Patient has no pulse and is not breathing): No CPR (Do Not Attempt to Resuscitate)  Medical Interventions (Patient has pulse or is breathing): Limited Support         Attending documentation:  I reviewed the above documentation and independently reviewed and rounded and evaluated the patient and discussed the care plan with SANDOR Goetz PA-C, I agree with his findings and plan as documented, what I have added to the care plan and modified is as follows in my documentation and my medical decision making; 59-year-old male with history of TBI, sleep apnea, hypertension, history of DVT, diabetes, depression, liver cirrhosis with splenomegaly, anxiety, history of gastric ulcers with bleed, chronic diastolic CHF, hospitalized on 11/25/2024 with chief complaint of unsteadiness, confusion, shortness of breath, found to have an ammonia level of 69, acute anemia requiring blood transfusion, creatinine 1.28, sodium 134, coagulopathy due to liver disease, PRBC ordered for transfusion, IR consulted for paracentesis of ascites fluid, reported hematemesis for a week, seen in GI clinic, sent to the emergency room, status post EGD, found to have portal hypertensive gastropathy with oozing, treated with argon plasma coagulation.  Had worsening abdominal pain, scanned, CT abdomen pelvis initially was concerning for abnormal appearing bowel, rescanned with contrast, ruled out.  No pneumoperitoneum.  Surgery signed off.  Recurrent anemia resulting in drop in hemoglobin to 7.0, additional unit of blood ordered 12/1/2024. Interval follow-up: Patient seen and examined this morning, no acute distress, no acute major overnight events, mentation at  baseline, abdominal pain improving.  Did have episode of hematuria that he said resolved.  Hemoglobin down to 7.0, platelets 42,000, white blood cell count 2000.  1 unit PRBC ordered for transfusion.  BUN 26, creatinine 1.2, potassium 4.1.  Remains weak and fatigued.  No increased abdominal pain.  Review of systems obtained, all systems reviewed and negative except weakness and fatigue, On physical exam elderly appearing male, sitting at the edge of the bed, no acute distress, edematous, regular rate rhythm, diminished breath sounds, soft nontender abdomen with bruising, with distention, pallor, lower extremity edema.  Alert and oriented to self and surroundings.  Assessment as above, volume overloaded, acute anemia, concern for hematemesis prior to hospitalization, on EGD found to have acute oozing, likely etiology of acute blood loss anemia, history of GAVE, long-term nursing home resident, with other medical history as listed above with hyperammonemia and hepatic encephalopathy, status post EGD and argon plasma coagulation of oozing sites that were bleeding.  Plan, additional unit blood transfusion with PRBC 1 unit, continue lactulose to 30 g 4 times a day, following blood counts, if his hemoglobin drops to less than 7, phosphorus replacement, change Protonix to p.o. twice daily 40 mg, continue rifaximin 550 mg twice a day, continue Carafate, spironolactone, continue Lasix 40 mg IV twice daily, strict I's and O's, daily weights, no surgical intervention warranted at this time, general surgery recommendations appreciated, GI consulted, recommendations appreciated, continue spironolactone, continue Coreg 25 mg twice a day, BuSpar 7.5 mg 3 times daily, insulin sliding scale coverage with Lantus 10 units nightly, insulin sliding scale coverage, out of bed to chair, PT/OT, a.m. labs, full code, DVT prophylaxis with SCDs, clinical course dictate further management, More than 80 % of the time of this patient's encounter  was performed by me, this included face-to-face time, planning and coordinating, medical decision making and critical thinking personally done by me.    Electronically signed by Bandar Burch MD, 12/1/2024, 11:38 EST.    Portions of this documentation were transcribed electronically from a voice recognition software.  I confirm all data accurately represents the service(s) I performed at today's visit.

## 2024-12-02 ENCOUNTER — TELEPHONE (OUTPATIENT)
Dept: GASTROENTEROLOGY | Facility: CLINIC | Age: 59
End: 2024-12-02
Payer: MEDICAID

## 2024-12-02 LAB
ALBUMIN SERPL-MCNC: 2.5 G/DL (ref 3.5–5.2)
ALP SERPL-CCNC: 94 U/L (ref 39–117)
ALT SERPL W P-5'-P-CCNC: 13 U/L (ref 1–41)
ANION GAP SERPL CALCULATED.3IONS-SCNC: 5.7 MMOL/L (ref 5–15)
ANISOCYTOSIS BLD QL: NORMAL
AST SERPL-CCNC: 30 U/L (ref 1–40)
BACTERIA SPEC ANAEROBE CULT: NORMAL
BASOPHILS # BLD AUTO: 0.02 10*3/MM3 (ref 0–0.2)
BASOPHILS NFR BLD AUTO: 0.7 % (ref 0–1.5)
BILIRUB CONJ SERPL-MCNC: 0.5 MG/DL (ref 0–0.3)
BILIRUB INDIRECT SERPL-MCNC: 0.4 MG/DL
BILIRUB SERPL-MCNC: 0.9 MG/DL (ref 0–1.2)
BUN SERPL-MCNC: 24 MG/DL (ref 6–20)
BUN/CREAT SERPL: 21.1 (ref 7–25)
CALCIUM SPEC-SCNC: 7.7 MG/DL (ref 8.6–10.5)
CHLORIDE SERPL-SCNC: 106 MMOL/L (ref 98–107)
CO2 SERPL-SCNC: 26.3 MMOL/L (ref 22–29)
CREAT SERPL-MCNC: 1.14 MG/DL (ref 0.76–1.27)
CYTO UR: NORMAL
DEPRECATED RDW RBC AUTO: 60.8 FL (ref 37–54)
EGFRCR SERPLBLD CKD-EPI 2021: 74.5 ML/MIN/1.73
ELLIPTOCYTES BLD QL SMEAR: NORMAL
EOSINOPHIL # BLD AUTO: 0.09 10*3/MM3 (ref 0–0.4)
EOSINOPHIL NFR BLD AUTO: 3.3 % (ref 0.3–6.2)
ERYTHROCYTE [DISTWIDTH] IN BLOOD BY AUTOMATED COUNT: 18.9 % (ref 12.3–15.4)
GLUCOSE BLDC GLUCOMTR-MCNC: 177 MG/DL (ref 70–99)
GLUCOSE BLDC GLUCOMTR-MCNC: 206 MG/DL (ref 70–99)
GLUCOSE BLDC GLUCOMTR-MCNC: 245 MG/DL (ref 70–99)
GLUCOSE BLDC GLUCOMTR-MCNC: 273 MG/DL (ref 70–99)
GLUCOSE SERPL-MCNC: 193 MG/DL (ref 65–99)
HCT VFR BLD AUTO: 25.4 % (ref 37.5–51)
HGB BLD-MCNC: 7.7 G/DL (ref 13–17.7)
HYPOCHROMIA BLD QL: NORMAL
IMM GRANULOCYTES # BLD AUTO: 0.01 10*3/MM3 (ref 0–0.05)
IMM GRANULOCYTES NFR BLD AUTO: 0.4 % (ref 0–0.5)
LAB AP CASE REPORT: NORMAL
LAB AP CLINICAL INFORMATION: NORMAL
LYMPHOCYTES # BLD AUTO: 0.61 10*3/MM3 (ref 0.7–3.1)
LYMPHOCYTES NFR BLD AUTO: 22.7 % (ref 19.6–45.3)
MAGNESIUM SERPL-MCNC: 1.8 MG/DL (ref 1.6–2.6)
MCH RBC QN AUTO: 26.3 PG (ref 26.6–33)
MCHC RBC AUTO-ENTMCNC: 30.3 G/DL (ref 31.5–35.7)
MCV RBC AUTO: 86.7 FL (ref 79–97)
MONOCYTES # BLD AUTO: 0.22 10*3/MM3 (ref 0.1–0.9)
MONOCYTES NFR BLD AUTO: 8.2 % (ref 5–12)
NEUTROPHILS NFR BLD AUTO: 1.74 10*3/MM3 (ref 1.7–7)
NEUTROPHILS NFR BLD AUTO: 64.7 % (ref 42.7–76)
NRBC BLD AUTO-RTO: 0 /100 WBC (ref 0–0.2)
OVALOCYTES BLD QL SMEAR: NORMAL
PATH REPORT.FINAL DX SPEC: NORMAL
PATH REPORT.GROSS SPEC: NORMAL
PHOSPHATE SERPL-MCNC: 2.1 MG/DL (ref 2.5–4.5)
PLATELET # BLD AUTO: 45 10*3/MM3 (ref 140–450)
PMV BLD AUTO: 10.8 FL (ref 6–12)
POIKILOCYTOSIS BLD QL SMEAR: NORMAL
POTASSIUM SERPL-SCNC: 4 MMOL/L (ref 3.5–5.2)
PROT SERPL-MCNC: 5 G/DL (ref 6–8.5)
RBC # BLD AUTO: 2.93 10*6/MM3 (ref 4.14–5.8)
SMALL PLATELETS BLD QL SMEAR: NORMAL
SODIUM SERPL-SCNC: 138 MMOL/L (ref 136–145)
WBC MORPH BLD: NORMAL
WBC NRBC COR # BLD AUTO: 2.69 10*3/MM3 (ref 3.4–10.8)

## 2024-12-02 PROCEDURE — 84100 ASSAY OF PHOSPHORUS: CPT | Performed by: FAMILY MEDICINE

## 2024-12-02 PROCEDURE — 63710000001 INSULIN GLARGINE PER 5 UNITS: Performed by: INTERNAL MEDICINE

## 2024-12-02 PROCEDURE — 82948 REAGENT STRIP/BLOOD GLUCOSE: CPT

## 2024-12-02 PROCEDURE — 80076 HEPATIC FUNCTION PANEL: CPT | Performed by: FAMILY MEDICINE

## 2024-12-02 PROCEDURE — 63710000001 INSULIN LISPRO (HUMAN) PER 5 UNITS: Performed by: INTERNAL MEDICINE

## 2024-12-02 PROCEDURE — 80048 BASIC METABOLIC PNL TOTAL CA: CPT | Performed by: FAMILY MEDICINE

## 2024-12-02 PROCEDURE — 85025 COMPLETE CBC W/AUTO DIFF WBC: CPT | Performed by: FAMILY MEDICINE

## 2024-12-02 PROCEDURE — 25010000002 FUROSEMIDE PER 20 MG: Performed by: FAMILY MEDICINE

## 2024-12-02 PROCEDURE — 25010000002 PIPERACILLIN SOD-TAZOBACTAM PER 1 G: Performed by: FAMILY MEDICINE

## 2024-12-02 PROCEDURE — 85007 BL SMEAR W/DIFF WBC COUNT: CPT | Performed by: FAMILY MEDICINE

## 2024-12-02 PROCEDURE — 99232 SBSQ HOSP IP/OBS MODERATE 35: CPT | Performed by: FAMILY MEDICINE

## 2024-12-02 PROCEDURE — 83735 ASSAY OF MAGNESIUM: CPT | Performed by: FAMILY MEDICINE

## 2024-12-02 PROCEDURE — 82948 REAGENT STRIP/BLOOD GLUCOSE: CPT | Performed by: INTERNAL MEDICINE

## 2024-12-02 PROCEDURE — 25010000002 MORPHINE PER 10 MG: Performed by: INTERNAL MEDICINE

## 2024-12-02 RX ORDER — MORPHINE SULFATE 2 MG/ML
2 INJECTION, SOLUTION INTRAMUSCULAR; INTRAVENOUS EVERY 4 HOURS PRN
Status: DISPENSED | OUTPATIENT
Start: 2024-12-02 | End: 2024-12-07

## 2024-12-02 RX ORDER — FUROSEMIDE 40 MG/1
40 TABLET ORAL
Status: DISCONTINUED | OUTPATIENT
Start: 2024-12-02 | End: 2024-12-05

## 2024-12-02 RX ADMIN — INSULIN LISPRO 2 UNITS: 100 INJECTION, SOLUTION INTRAVENOUS; SUBCUTANEOUS at 08:35

## 2024-12-02 RX ADMIN — BACITRACIN 0.9 G: 500 OINTMENT TOPICAL at 15:20

## 2024-12-02 RX ADMIN — PANTOPRAZOLE SODIUM 40 MG: 40 TABLET, DELAYED RELEASE ORAL at 08:37

## 2024-12-02 RX ADMIN — INSULIN LISPRO 6 UNITS: 100 INJECTION, SOLUTION INTRAVENOUS; SUBCUTANEOUS at 17:33

## 2024-12-02 RX ADMIN — BACITRACIN 0.9 G: 500 OINTMENT TOPICAL at 08:35

## 2024-12-02 RX ADMIN — CARVEDILOL 25 MG: 25 TABLET, FILM COATED ORAL at 17:32

## 2024-12-02 RX ADMIN — FUROSEMIDE 40 MG: 40 TABLET ORAL at 12:13

## 2024-12-02 RX ADMIN — Medication 250 MG: at 08:35

## 2024-12-02 RX ADMIN — Medication 10 ML: at 08:37

## 2024-12-02 RX ADMIN — ROPINIROLE HYDROCHLORIDE 1 MG: 1 TABLET, FILM COATED ORAL at 21:11

## 2024-12-02 RX ADMIN — OXYCODONE 10 MG: 5 TABLET ORAL at 12:13

## 2024-12-02 RX ADMIN — PANTOPRAZOLE SODIUM 40 MG: 40 TABLET, DELAYED RELEASE ORAL at 06:01

## 2024-12-02 RX ADMIN — PIPERACILLIN AND TAZOBACTAM 4.5 G: 4; .5 INJECTION, POWDER, FOR SOLUTION INTRAVENOUS; PARENTERAL at 21:11

## 2024-12-02 RX ADMIN — INSULIN GLARGINE 10 UNITS: 100 INJECTION, SOLUTION SUBCUTANEOUS at 21:12

## 2024-12-02 RX ADMIN — CARVEDILOL 25 MG: 25 TABLET, FILM COATED ORAL at 08:35

## 2024-12-02 RX ADMIN — HYDROXYZINE HYDROCHLORIDE 25 MG: 25 TABLET ORAL at 17:35

## 2024-12-02 RX ADMIN — PANTOPRAZOLE SODIUM 40 MG: 40 TABLET, DELAYED RELEASE ORAL at 17:32

## 2024-12-02 RX ADMIN — Medication 250 MG: at 21:10

## 2024-12-02 RX ADMIN — SUCRALFATE 1 G: 1 TABLET ORAL at 17:32

## 2024-12-02 RX ADMIN — SERTRALINE HYDROCHLORIDE 100 MG: 100 TABLET ORAL at 21:11

## 2024-12-02 RX ADMIN — LEVETIRACETAM 500 MG: 500 TABLET, FILM COATED ORAL at 21:11

## 2024-12-02 RX ADMIN — LACTULOSE 30 G: 20 SOLUTION ORAL at 21:11

## 2024-12-02 RX ADMIN — BACITRACIN 0.9 G: 500 OINTMENT TOPICAL at 21:10

## 2024-12-02 RX ADMIN — LACTULOSE 30 G: 20 SOLUTION ORAL at 17:32

## 2024-12-02 RX ADMIN — INSULIN LISPRO 4 UNITS: 100 INJECTION, SOLUTION INTRAVENOUS; SUBCUTANEOUS at 12:13

## 2024-12-02 RX ADMIN — INSULIN LISPRO 4 UNITS: 100 INJECTION, SOLUTION INTRAVENOUS; SUBCUTANEOUS at 21:12

## 2024-12-02 RX ADMIN — OXYCODONE 10 MG: 5 TABLET ORAL at 21:29

## 2024-12-02 RX ADMIN — RIFAXIMIN 550 MG: 550 TABLET ORAL at 15:20

## 2024-12-02 RX ADMIN — BUSPIRONE HYDROCHLORIDE 7.5 MG: 7.5 TABLET ORAL at 15:20

## 2024-12-02 RX ADMIN — SPIRONOLACTONE 100 MG: 25 TABLET ORAL at 08:35

## 2024-12-02 RX ADMIN — SUCRALFATE 1 G: 1 TABLET ORAL at 08:35

## 2024-12-02 RX ADMIN — BUSPIRONE HYDROCHLORIDE 7.5 MG: 7.5 TABLET ORAL at 08:35

## 2024-12-02 RX ADMIN — DICLOFENAC SODIUM 2 G: 10 GEL TOPICAL at 08:36

## 2024-12-02 RX ADMIN — LACTULOSE 30 G: 20 SOLUTION ORAL at 12:13

## 2024-12-02 RX ADMIN — PIPERACILLIN AND TAZOBACTAM 4.5 G: 4; .5 INJECTION, POWDER, FOR SOLUTION INTRAVENOUS; PARENTERAL at 12:14

## 2024-12-02 RX ADMIN — FLUTICASONE PROPIONATE 1 SPRAY: 50 SPRAY, METERED NASAL at 08:37

## 2024-12-02 RX ADMIN — CETIRIZINE HYDROCHLORIDE 5 MG: 10 TABLET, FILM COATED ORAL at 21:10

## 2024-12-02 RX ADMIN — RIFAXIMIN 550 MG: 550 TABLET ORAL at 03:51

## 2024-12-02 RX ADMIN — MORPHINE SULFATE 2 MG: 2 INJECTION, SOLUTION INTRAMUSCULAR; INTRAVENOUS at 02:11

## 2024-12-02 RX ADMIN — SUCRALFATE 1 G: 1 TABLET ORAL at 12:13

## 2024-12-02 RX ADMIN — HYDROXYZINE HYDROCHLORIDE 25 MG: 25 TABLET ORAL at 08:35

## 2024-12-02 RX ADMIN — FUROSEMIDE 40 MG: 10 INJECTION, SOLUTION INTRAMUSCULAR; INTRAVENOUS at 06:01

## 2024-12-02 RX ADMIN — OXYCODONE 10 MG: 5 TABLET ORAL at 03:51

## 2024-12-02 RX ADMIN — BUSPIRONE HYDROCHLORIDE 7.5 MG: 7.5 TABLET ORAL at 21:11

## 2024-12-02 RX ADMIN — LACTULOSE 30 G: 20 SOLUTION ORAL at 08:35

## 2024-12-02 RX ADMIN — SUCRALFATE 1 G: 1 TABLET ORAL at 21:11

## 2024-12-02 RX ADMIN — FUROSEMIDE 40 MG: 40 TABLET ORAL at 17:32

## 2024-12-02 RX ADMIN — LEVETIRACETAM 500 MG: 500 TABLET, FILM COATED ORAL at 08:35

## 2024-12-02 RX ADMIN — PIPERACILLIN AND TAZOBACTAM 4.5 G: 4; .5 INJECTION, POWDER, FOR SOLUTION INTRAVENOUS; PARENTERAL at 03:51

## 2024-12-02 RX ADMIN — HYDROCORTISONE ACETATE 1 APPLICATION: 1 CREAM TOPICAL at 08:36

## 2024-12-02 RX ADMIN — Medication 10 ML: at 21:13

## 2024-12-02 NOTE — TELEPHONE ENCOUNTER
Patient needs outpatient follow-up in office-established with Dr. Roblero/Erin LA  Once discharged

## 2024-12-02 NOTE — PROGRESS NOTES
[facility]   Hospitalist Progress Note  Date: 2024  Patient Name: Nic Nicolas  : 1965  MRN: [id number]  Date of admission: 2024      Subjective   Subjective     Chief Complaint: Shortness of breath confusion hematemesis    Summary: Patient is a 59-year-old male past medical history significant for cirrhosis, TBI, diabetes, hypertension, hyperlipidemia, CHF, CKD, asthma, obesity, recurrent hepatic encephalopathy that presents to the emergency department after being seen by his gastroenterologist for confusion and unsteadiness shortness of breath and a week of hematemesis patient evaluated emergency department was encephalopathic with elevated ammonia appeared volume overloaded patient with worsening anemia has been admitted to the hospitalist service GI consulted patient transfused 1 unit of packed red blood cell will likely need endoscopic evaluation started on ceftriaxone for decompensated cirrhosis continuing rifaximin and lactulose continue with Carafate adding IV PPI    Interval Followup: 2024    Pain could be better controlled  Creatinine better 1.1 (down from 1.5)  Glucose 170s, controlled  Volume status improved.  Convert IV diuretics to oral.  Hgb 7.7/WBC 2.69/platelets 45  Discussed upcoming disposition.  Return soon to his residence at [place]    Objective   Objective     Vitals:   Temp:  [97.2 °F (36.2 °C)-98.6 °F (37 °C)] 97.5 °F (36.4 °C)  Heart Rate:  [65-72] 66  Resp:  [18-20] 18  BP: (112-135)/(37-65) 127/65  Physical Exam   Constitutional: Awake alert no acute distress.    Respiratory diminished  Cardiovascular RRR  GI: Abdomen obese, soft mildly tender distended.  Scattered ecchymosis  Extremities scattered bruising    Result Review    Result Review:  I have personally reviewed the pertinent results from the past 24 hours to 2024 11:34 EST and agree with these findings:  [x]  Laboratory   CBC          2024    05:44 2024    05:57 2024     18:36 12/2/2024    05:26   CBC   WBC 2.12  2.36   2.69    RBC 2.80  2.68   2.93    Hemoglobin 7.5  7.0  8.6  7.7    Hematocrit 24.5  23.4  28.0  25.4    MCV 87.5  87.3   86.7    MCH 26.8  26.1   26.3    MCHC 30.6  29.9   30.3    RDW 18.4  18.5   18.9    Platelets 40  42   45      BMP          11/30/2024    05:44 12/1/2024    05:57 12/2/2024    05:26   BMP   BUN 24  26  24    Creatinine 1.34  1.20  1.14    Sodium 138  139  138    Potassium 4.1  4.1  4.0    Chloride 106  107  106    CO2 25.3  26.6  26.3    Calcium 7.9  8.0  7.7      LIVER FUNCTION TESTS:      Lab 12/02/24  0526 12/01/24  0557 11/30/24  0544 11/29/24  1837 11/29/24  0531 11/28/24  0522 11/27/24  0523 11/25/24  1818 11/25/24 1818   TOTAL PROTEIN 5.0* 5.0* 5.3* 5.4* 5.4* 6.2 5.6*  --  6.3   ALBUMIN 2.5* 2.6* 2.9* 2.9* 2.9* 3.1* 2.8*  --  3.1*   GLOBULIN  --   --   --  2.5  --   --  2.8  --  3.2   ALT (SGPT) 13 14 16 17 17 21 19  --  22   AST (SGOT) 30 30 37 39 39 50* 43*  --  45*   BILIRUBIN 0.9 0.8 1.0 0.8 0.8 1.3* 1.7*  --  1.6*   INDIRECT BILIRUBIN 0.4 0.4 0.5  --  0.4 0.7  --   --   --    BILIRUBIN DIRECT 0.5* 0.4* 0.5*  --  0.4* 0.6* 0.7*   < >  --    ALK PHOS 94 97 102 119* 111 128* 123*  --  142*   LIPASE  --   --   --   --   --   --   --   --  37    < > = values in this interval not displayed.       [x]  Microbiology   Microbiology Results (last 10 days)       Procedure Component Value - Date/Time    Body Fluid Culture - Body Fluid, Peritoneum [[id number]] Collected: 11/27/24 0957    Lab Status: Final result Specimen: Body Fluid from Peritoneum Updated: 11/30/24 0719     Body Fluid Culture No growth at 3 days     Gram Stain Moderate (3+) WBCs seen      No organisms seen    Anaerobic Culture - Body Fluid, Peritoneum [[id number]]  (Normal) Collected: 11/27/24 0957    Lab Status: Final result Specimen: Body Fluid from Peritoneum Updated: 12/02/24 0642     Anaerobic Culture No anaerobes isolated at 5 days              [x]  Radiology CT Abdomen Pelvis  Without Contrast    Result Date: 11/29/2024  No significant interval change is seen since the prior CT study from earlier during the same day. No pneumoperitoneum is seen.    Portions of this note were completed with a voice recognition program.  Electronically Signed By-Herberth Arevalo MD On:11/29/2024 9:40 PM      CT Abdomen Pelvis Without Contrast    Addendum Date: 11/29/2024    ADDENDUM #1 By report, the site of paracentesis was in the anterior right mid abdomen. Tiny gas collections seen adjacent to the colon on series 3 images 103 through 115 may be extraluminal. I discussed findings with Mr. Goetz at 1215. I would recommend follow-up CT scan of the abdomen and pelvis be obtained after administration of oral contrast. I would recommend a delay of 4 hours following ministration of IV contrast to optimize evaluation of the colon. IV contrast would also be helpful. Electronically Signed: Yury Starks MD  11/29/2024 12:39 PM EST  Workstation ID: [id number] ORIGINAL REPORT: CT ABDOMEN PELVIS WO CONTRAST Date of Exam: 11/29/2024 11:18 AM EST Indication: Lower Abd pain. Comparison: CT abdomen and pelvis 11/25/2024 Technique: Axial CT images were obtained of the abdomen and pelvis without the administration of contrast. Reconstructed coronal and sagittal images were also obtained. Automated exposure control and iterative construction methods were used. Findings: The lung bases are clear. The liver has a nodular contour consistent with cirrhosis. The right liver lobe is relatively small. The gallbladder is not abnormally distended. No pancreatic or adrenal mass is evident. The spleen is enlarged, measuring 19 cm x 11 cm in greatest AP and  transverse dimension, which is not significantly changed. A small amount of peritoneal fluid is less in comparison to 11/25/2024. The patient is undergone interim paracentesis. Ill-defined increased density in subcutaneous soft tissues is consistent with anasarca. No renal or  ureteral stones are seen. There is no evidence of hydronephrosis. The urinary bladder is not abnormally distended. The prostate gland measures 5 cm in greatest transverse dimension. The stomach is not abnormally distended. Bowel loops are of normal caliber. Evaluation of solid organs and bowel is limited without contrast. Midline hernia defect in the epigastric region measures 2 cm in diameter. Abdominal fat herniating through the defect measures 5.5 cm. Small umbilical hernia containing fat and fluid is stable. Bilateral inguinal hernias are stable. Left inguinal adenopathy is evident, with lymph nodes measuring up to 2 cm in greatest short axis dimension. Degenerative changes are seen in the lower thoracic and lumbar spine. Hardware in the left hemipelvis is consistent with ORIF. Impression: 1.Cirrhotic liver. 2.Splenomegaly, not significantly changed. 3.Small amount of peritoneal fluid, less than on 11/25/2024. The patient is undergone interim paracentesis. Anasarca. 4.Mildly enlarged prostate gland. 5.Left inguinal adenopathy. Electronically Signed: Yruy Starks MD  11/29/2024 11:39 AM EST  Workstation ID: [id number]    Result Date: 11/29/2024  Impression: 1.Cirrhotic liver. 2.Splenomegaly, not significantly changed. 3.Small amount of peritoneal fluid, less than on 11/25/2024. The patient is undergone interim paracentesis. Anasarca. 4.Mildly enlarged prostate gland. 5.Left inguinal adenopathy. Electronically Signed: Yury Starks MD  11/29/2024 11:39 AM EST  Workstation ID: [id number]    CT Head Without Contrast    Result Date: 11/29/2024  Impression: No acute intracranial process identified. Electronically Signed: Emanuel Dewey MD  11/29/2024 11:36 AM EST  Workstation ID: [id number]    US Paracentesis    Result Date: 11/27/2024  Impression: Successful ultrasound-guided diagnostic paracentesis without immediate complication.  Electronically Signed: Jimmy Jordan MD  11/27/2024 10:10 AM EST  Workstation ID:  [id number]    XR Chest 1 View    Result Date: 11/26/2024  Mild infiltrate or atelectasis at the right base. Electronically Signed: Kye Breaux MD  11/26/2024 5:47 AM EST  Workstation ID: [id number]    CT Abdomen Pelvis With Contrast    Result Date: 11/25/2024  Impression: 1.Small to moderate volume ascites most pronounced in the right upper and lower quadrants. 2.Cirrhosis with splenomegaly. 3.Cholelithiasis without gallbladder inflammatory changes. 4.Fat and fluid-containing umbilical hernia, similar to prior examination. 5.Increasing simple appearing fluid within the left inguinal canal now measuring 5.4 cm in diameter, likely ascites related. 6.Mild generalized body wall edema. Electronically Signed: Emanuel Dewey MD  11/25/2024 8:48 PM EST  Workstation ID: [id number]       []  EKG/Telemetry   No orders to display       []  Cardiology/Vascular   []  Pathology  [x]  Old records  []  Other:    Assessment & Plan   Assessment / Plan     Assessment:    Hematemesis / Suspect acute upper GI bleed  Acute on chronic anemia  Acute decompensated cirrhosis  Status post paracentesis 11/27/24.  130 cc.    Status post GD 11/27/24.  Bleeding treated with argon plasma coagulopathy.  ROMO  Acute on chronic diastolic congestive heart failure  Anasarca  Thrombocytopenia secondary to cirrhosis  Hepatic encephalopathy  CKD stage III  History of TBI  Splenomegaly  Diabetes  History of DVT  Nursing home long-term care resident: [facility]  Bleeding portal hypertensive gastropathy in the setting of thrombocytopenia/elevated INR  Pancytopenia  Code Status: DNR    Plan:    Status post PRBCs.  Keep Hgb greater than 7.  Convert Lasix to oral formulation.  Continue Aldactone.    Repeat CT abdomen does not show any adverse sequela from recent paracentesis   Appreciate general surgery consult.    Gastroenterology consultation.   Finish IV ceftriaxone for decompensated liver cirrhosis  Fluids/Na restriction.  Continuing lactulose and rifaximin  for hepatic encephalopathy  Continue basal, bolus, correction insulin  Wound care consultation  Disposition: Once medically stable, plan on returning to [place] (long-term resident)  Long-term prognosis poor.  Palliative care consult.     Discussed plan with RN.    VTE Prophylaxis:  Mechanical VTE prophylaxis orders are present.        CODE STATUS:   Medical Intervention Limits: No intubation (DNI)  Level Of Support Discussed With: Patient; Next of Kin (If No Surrogate)  Code Status (Patient has no pulse and is not breathing): No CPR (Do Not Attempt to Resuscitate)  Medical Interventions (Patient has pulse or is breathing): Limited Support         Attending documentation:  I reviewed the above documentation and independently reviewed and rounded and evaluated the patient and discussed the care plan with SANDOR Goetz PA-C, I agree with his findings and plan as documented, what I have added to the care plan and modified is as follows in my documentation and my medical decision making; 59-year-old male with history of TBI, sleep apnea, hypertension, history of DVT, diabetes, depression, liver cirrhosis with splenomegaly, anxiety, history of gastric ulcers with bleed, chronic diastolic CHF, hospitalized on 11/25/2024 with chief complaint of unsteadiness, confusion, shortness of breath, found to have an ammonia level of 69, acute anemia requiring blood transfusion, coagulopathy due to liver disease, PRBC ordered for transfusion, IR consulted for paracentesis of ascites fluid, reported hematemesis for a week, seen in GI clinic, sent to the emergency room, status post EGD, found to have portal hypertensive gastropathy with oozing, treated with argon plasma coagulation.  Had worsening abdominal pain, scanned, CT abdomen pelvis initially was concerning for abnormal appearing bowel, rescanned with contrast, ruled out.  No pneumoperitoneum.  Surgery signed off.  Recurrent anemia resulting in drop in hemoglobin to 7.0,  additional unit of blood ordered 12/1/2024. Interval follow-up: Patient seen and examined this morning, no acute distress, no acute major overnight events, mentation at baseline, abdominal pain improving.  Platelets 45,000, hemoglobin around 7.7, white blood cell count 2690, creatinine 1.14, potassium 4.0, sodium 138.  Tolerating oral intake.  Still has diffuse abdominal pain.  Remains very weak and fatigued.  With drop in hemoglobin, additional day of monitoring warranted.  Review of systems obtained, all systems reviewed and negative except weakness and fatigue, On physical exam elderly appearing male, laying in bed, no acute distress, edematous, regular rate rhythm, diminished breath sounds, soft nontender abdomen with bruising, with distention, pallor, lower extremity edema.  Alert and oriented to self and surroundings.  Assessment as above, volume overloaded, acute anemia, concern for hematemesis prior to hospitalization, on EGD found to have acute oozing, likely etiology of acute blood loss anemia, history of GAVE, long-term nursing home resident, with other medical history as listed above with hyperammonemia and hepatic encephalopathy, status post EGD and argon plasma coagulation of oozing sites that were bleeding.  Plan, watch his hemoglobin for 1 more day, after 1 unit PRBC transfusion, hemoglobin got up to 8.6 but fell down to 7.7, continue lactulose to 30 g 4 times a day, following blood counts, continue Protonix to p.o. twice daily 40 mg, continue rifaximin 550 mg twice a day, continue Carafate, spironolactone, change Lasix 40 mg p.o. twice daily, strict I's and O's, daily weights,continue spironolactone, continue Coreg 25 mg twice a day, BuSpar 7.5 mg 3 times daily, insulin sliding scale coverage with Lantus 10 units nightly, insulin sliding scale coverage, out of bed to chair, PT/OT, a.m. labs, full code, DVT prophylaxis with SCDs, clinical course dictate further management, More than 8 75 % of the  time of this patient's encounter was performed by me, this included face-to-face time, planning and coordinating, medical decision making and critical thinking personally done by me.    Electronically signed by Bandar Burch MD, 12/2/2024, 11:34 EST.    Portions of this documentation were transcribed electronically from a voice recognition software.  I confirm all data accurately represents the service(s) I performed at today's visit.

## 2024-12-02 NOTE — PLAN OF CARE
Goal Outcome Evaluation:      Patient complained of pain and discomfort, medicated per orders, resting most of the day, getting up ad myrtle with a walker to bathroom, vitals stable,

## 2024-12-03 LAB
ALBUMIN SERPL-MCNC: 2.9 G/DL (ref 3.5–5.2)
ALP SERPL-CCNC: 98 U/L (ref 39–117)
ALT SERPL W P-5'-P-CCNC: 16 U/L (ref 1–41)
ANION GAP SERPL CALCULATED.3IONS-SCNC: 7.5 MMOL/L (ref 5–15)
AST SERPL-CCNC: 39 U/L (ref 1–40)
BASOPHILS # BLD AUTO: 0.04 10*3/MM3 (ref 0–0.2)
BASOPHILS NFR BLD AUTO: 1.2 % (ref 0–1.5)
BH BB BLOOD EXPIRATION DATE: NORMAL
BH BB BLOOD TYPE BARCODE: 6200
BH BB DISPENSE STATUS: NORMAL
BH BB PRODUCT CODE: NORMAL
BH BB UNIT NUMBER: NORMAL
BILIRUB CONJ SERPL-MCNC: 0.4 MG/DL (ref 0–0.3)
BILIRUB INDIRECT SERPL-MCNC: 0.6 MG/DL
BILIRUB SERPL-MCNC: 1 MG/DL (ref 0–1.2)
BUN SERPL-MCNC: 25 MG/DL (ref 6–20)
BUN/CREAT SERPL: 22.5 (ref 7–25)
CALCIUM SPEC-SCNC: 7.9 MG/DL (ref 8.6–10.5)
CHLORIDE SERPL-SCNC: 106 MMOL/L (ref 98–107)
CO2 SERPL-SCNC: 24.5 MMOL/L (ref 22–29)
CREAT SERPL-MCNC: 1.11 MG/DL (ref 0.76–1.27)
CROSSMATCH INTERPRETATION: NORMAL
DEPRECATED RDW RBC AUTO: 61.2 FL (ref 37–54)
EGFRCR SERPLBLD CKD-EPI 2021: 77 ML/MIN/1.73
EOSINOPHIL # BLD AUTO: 0.13 10*3/MM3 (ref 0–0.4)
EOSINOPHIL NFR BLD AUTO: 3.9 % (ref 0.3–6.2)
ERYTHROCYTE [DISTWIDTH] IN BLOOD BY AUTOMATED COUNT: 19 % (ref 12.3–15.4)
GLUCOSE BLDC GLUCOMTR-MCNC: 173 MG/DL (ref 70–99)
GLUCOSE BLDC GLUCOMTR-MCNC: 208 MG/DL (ref 70–99)
GLUCOSE BLDC GLUCOMTR-MCNC: 210 MG/DL (ref 70–99)
GLUCOSE BLDC GLUCOMTR-MCNC: 246 MG/DL (ref 70–99)
GLUCOSE SERPL-MCNC: 198 MG/DL (ref 65–99)
HCT VFR BLD AUTO: 27.6 % (ref 37.5–51)
HGB BLD-MCNC: 8.2 G/DL (ref 13–17.7)
IMM GRANULOCYTES # BLD AUTO: 0.02 10*3/MM3 (ref 0–0.05)
IMM GRANULOCYTES NFR BLD AUTO: 0.6 % (ref 0–0.5)
LYMPHOCYTES # BLD AUTO: 0.66 10*3/MM3 (ref 0.7–3.1)
LYMPHOCYTES NFR BLD AUTO: 19.8 % (ref 19.6–45.3)
MAGNESIUM SERPL-MCNC: 1.6 MG/DL (ref 1.6–2.6)
MCH RBC QN AUTO: 26.1 PG (ref 26.6–33)
MCHC RBC AUTO-ENTMCNC: 29.7 G/DL (ref 31.5–35.7)
MCV RBC AUTO: 87.9 FL (ref 79–97)
MONOCYTES # BLD AUTO: 0.32 10*3/MM3 (ref 0.1–0.9)
MONOCYTES NFR BLD AUTO: 9.6 % (ref 5–12)
NEUTROPHILS NFR BLD AUTO: 2.17 10*3/MM3 (ref 1.7–7)
NEUTROPHILS NFR BLD AUTO: 64.9 % (ref 42.7–76)
NRBC BLD AUTO-RTO: 0 /100 WBC (ref 0–0.2)
PHOSPHATE SERPL-MCNC: 2.5 MG/DL (ref 2.5–4.5)
PLATELET # BLD AUTO: 53 10*3/MM3 (ref 140–450)
PMV BLD AUTO: 10.3 FL (ref 6–12)
POTASSIUM SERPL-SCNC: 4.1 MMOL/L (ref 3.5–5.2)
PROT SERPL-MCNC: 5.6 G/DL (ref 6–8.5)
RBC # BLD AUTO: 3.14 10*6/MM3 (ref 4.14–5.8)
SODIUM SERPL-SCNC: 138 MMOL/L (ref 136–145)
UNIT  ABO: NORMAL
UNIT  RH: NORMAL
WBC NRBC COR # BLD AUTO: 3.34 10*3/MM3 (ref 3.4–10.8)

## 2024-12-03 PROCEDURE — 83735 ASSAY OF MAGNESIUM: CPT | Performed by: FAMILY MEDICINE

## 2024-12-03 PROCEDURE — 25010000002 PIPERACILLIN SOD-TAZOBACTAM PER 1 G: Performed by: FAMILY MEDICINE

## 2024-12-03 PROCEDURE — 84100 ASSAY OF PHOSPHORUS: CPT | Performed by: FAMILY MEDICINE

## 2024-12-03 PROCEDURE — 85025 COMPLETE CBC W/AUTO DIFF WBC: CPT | Performed by: FAMILY MEDICINE

## 2024-12-03 PROCEDURE — 82948 REAGENT STRIP/BLOOD GLUCOSE: CPT

## 2024-12-03 PROCEDURE — 63710000001 INSULIN GLARGINE PER 5 UNITS: Performed by: INTERNAL MEDICINE

## 2024-12-03 PROCEDURE — 80048 BASIC METABOLIC PNL TOTAL CA: CPT | Performed by: FAMILY MEDICINE

## 2024-12-03 PROCEDURE — 63710000001 INSULIN LISPRO (HUMAN) PER 5 UNITS: Performed by: INTERNAL MEDICINE

## 2024-12-03 PROCEDURE — 25010000002 MORPHINE PER 10 MG: Performed by: STUDENT IN AN ORGANIZED HEALTH CARE EDUCATION/TRAINING PROGRAM

## 2024-12-03 PROCEDURE — 99232 SBSQ HOSP IP/OBS MODERATE 35: CPT | Performed by: INTERNAL MEDICINE

## 2024-12-03 PROCEDURE — 80076 HEPATIC FUNCTION PANEL: CPT | Performed by: FAMILY MEDICINE

## 2024-12-03 PROCEDURE — 82948 REAGENT STRIP/BLOOD GLUCOSE: CPT | Performed by: INTERNAL MEDICINE

## 2024-12-03 RX ADMIN — FUROSEMIDE 40 MG: 40 TABLET ORAL at 17:26

## 2024-12-03 RX ADMIN — LACTULOSE 30 G: 20 SOLUTION ORAL at 13:25

## 2024-12-03 RX ADMIN — MORPHINE SULFATE 2 MG: 2 INJECTION, SOLUTION INTRAMUSCULAR; INTRAVENOUS at 16:16

## 2024-12-03 RX ADMIN — LEVETIRACETAM 500 MG: 500 TABLET, FILM COATED ORAL at 08:14

## 2024-12-03 RX ADMIN — MORPHINE SULFATE 2 MG: 2 INJECTION, SOLUTION INTRAMUSCULAR; INTRAVENOUS at 09:55

## 2024-12-03 RX ADMIN — SUCRALFATE 1 G: 1 TABLET ORAL at 20:21

## 2024-12-03 RX ADMIN — FLUTICASONE PROPIONATE 1 SPRAY: 50 SPRAY, METERED NASAL at 09:58

## 2024-12-03 RX ADMIN — INSULIN LISPRO 4 UNITS: 100 INJECTION, SOLUTION INTRAVENOUS; SUBCUTANEOUS at 13:25

## 2024-12-03 RX ADMIN — INSULIN LISPRO 4 UNITS: 100 INJECTION, SOLUTION INTRAVENOUS; SUBCUTANEOUS at 17:26

## 2024-12-03 RX ADMIN — SERTRALINE HYDROCHLORIDE 100 MG: 100 TABLET ORAL at 20:22

## 2024-12-03 RX ADMIN — OXYCODONE 10 MG: 5 TABLET ORAL at 13:25

## 2024-12-03 RX ADMIN — LEVETIRACETAM 500 MG: 500 TABLET, FILM COATED ORAL at 20:22

## 2024-12-03 RX ADMIN — BACITRACIN 0.9 G: 500 OINTMENT TOPICAL at 20:23

## 2024-12-03 RX ADMIN — BACITRACIN 0.9 G: 500 OINTMENT TOPICAL at 17:26

## 2024-12-03 RX ADMIN — SUCRALFATE 1 G: 1 TABLET ORAL at 17:26

## 2024-12-03 RX ADMIN — INSULIN GLARGINE 10 UNITS: 100 INJECTION, SOLUTION SUBCUTANEOUS at 20:36

## 2024-12-03 RX ADMIN — PANTOPRAZOLE SODIUM 40 MG: 40 TABLET, DELAYED RELEASE ORAL at 07:30

## 2024-12-03 RX ADMIN — SUCRALFATE 1 G: 1 TABLET ORAL at 08:14

## 2024-12-03 RX ADMIN — MORPHINE SULFATE 2 MG: 2 INJECTION, SOLUTION INTRAMUSCULAR; INTRAVENOUS at 20:43

## 2024-12-03 RX ADMIN — CARVEDILOL 25 MG: 25 TABLET, FILM COATED ORAL at 17:26

## 2024-12-03 RX ADMIN — BUSPIRONE HYDROCHLORIDE 7.5 MG: 7.5 TABLET ORAL at 08:14

## 2024-12-03 RX ADMIN — RIFAXIMIN 550 MG: 550 TABLET ORAL at 16:16

## 2024-12-03 RX ADMIN — DICLOFENAC SODIUM 2 G: 10 GEL TOPICAL at 20:23

## 2024-12-03 RX ADMIN — PIPERACILLIN AND TAZOBACTAM 4.5 G: 4; .5 INJECTION, POWDER, FOR SOLUTION INTRAVENOUS; PARENTERAL at 13:25

## 2024-12-03 RX ADMIN — BUSPIRONE HYDROCHLORIDE 7.5 MG: 7.5 TABLET ORAL at 20:23

## 2024-12-03 RX ADMIN — INSULIN LISPRO 2 UNITS: 100 INJECTION, SOLUTION INTRAVENOUS; SUBCUTANEOUS at 09:55

## 2024-12-03 RX ADMIN — PIPERACILLIN AND TAZOBACTAM 4.5 G: 4; .5 INJECTION, POWDER, FOR SOLUTION INTRAVENOUS; PARENTERAL at 20:21

## 2024-12-03 RX ADMIN — Medication 10 ML: at 00:05

## 2024-12-03 RX ADMIN — DICLOFENAC SODIUM 2 G: 10 GEL TOPICAL at 08:21

## 2024-12-03 RX ADMIN — LACTULOSE 30 G: 20 SOLUTION ORAL at 20:22

## 2024-12-03 RX ADMIN — Medication 10 ML: at 20:24

## 2024-12-03 RX ADMIN — SUCRALFATE 1 G: 1 TABLET ORAL at 13:25

## 2024-12-03 RX ADMIN — Medication 250 MG: at 08:14

## 2024-12-03 RX ADMIN — ROPINIROLE HYDROCHLORIDE 1 MG: 1 TABLET, FILM COATED ORAL at 20:22

## 2024-12-03 RX ADMIN — MORPHINE SULFATE 2 MG: 2 INJECTION, SOLUTION INTRAMUSCULAR; INTRAVENOUS at 04:03

## 2024-12-03 RX ADMIN — HYDROCORTISONE ACETATE 1 APPLICATION: 1 CREAM TOPICAL at 20:23

## 2024-12-03 RX ADMIN — OXYCODONE 10 MG: 5 TABLET ORAL at 23:11

## 2024-12-03 RX ADMIN — PIPERACILLIN AND TAZOBACTAM 4.5 G: 4; .5 INJECTION, POWDER, FOR SOLUTION INTRAVENOUS; PARENTERAL at 03:24

## 2024-12-03 RX ADMIN — Medication 250 MG: at 20:21

## 2024-12-03 RX ADMIN — BACITRACIN 0.9 G: 500 OINTMENT TOPICAL at 08:14

## 2024-12-03 RX ADMIN — HYDROCORTISONE ACETATE 1 APPLICATION: 1 CREAM TOPICAL at 08:21

## 2024-12-03 RX ADMIN — LACTULOSE 30 G: 20 SOLUTION ORAL at 17:26

## 2024-12-03 RX ADMIN — Medication 10 ML: at 09:55

## 2024-12-03 RX ADMIN — INSULIN LISPRO 4 UNITS: 100 INJECTION, SOLUTION INTRAVENOUS; SUBCUTANEOUS at 20:36

## 2024-12-03 RX ADMIN — RIFAXIMIN 550 MG: 550 TABLET ORAL at 03:24

## 2024-12-03 RX ADMIN — MORPHINE SULFATE 2 MG: 2 INJECTION, SOLUTION INTRAMUSCULAR; INTRAVENOUS at 00:05

## 2024-12-03 RX ADMIN — BUSPIRONE HYDROCHLORIDE 7.5 MG: 7.5 TABLET ORAL at 16:16

## 2024-12-03 RX ADMIN — CETIRIZINE HYDROCHLORIDE 5 MG: 10 TABLET, FILM COATED ORAL at 20:22

## 2024-12-03 RX ADMIN — FUROSEMIDE 40 MG: 40 TABLET ORAL at 08:14

## 2024-12-03 RX ADMIN — Medication 10 ML: at 04:03

## 2024-12-03 RX ADMIN — PANTOPRAZOLE SODIUM 40 MG: 40 TABLET, DELAYED RELEASE ORAL at 17:26

## 2024-12-03 NOTE — PLAN OF CARE
Goal Outcome Evaluation:      Pt is A&O x4. Stable vitals. Complained of back pain and received pain meds as per MAR. Slept in-between care.  Refused his voltaren and hydrocortisone cream. Continue care plan..

## 2024-12-03 NOTE — PROGRESS NOTES
[facility]   Hospitalist Progress Note  Date: 12/3/2024  Patient Name: Nic Nicolas  : 1965  MRN: [id number]  Date of admission: 2024      Subjective   Subjective     Chief Complaint: Shortness of breath confusion hematemesis    Summary: Patient is a 59-year-old male past medical history significant for cirrhosis, TBI, diabetes, hypertension, hyperlipidemia, CHF, CKD, asthma, obesity, recurrent hepatic encephalopathy that presents to the emergency department after being seen by his gastroenterologist for confusion and unsteadiness shortness of breath and a week of hematemesis patient evaluated emergency department was encephalopathic with elevated ammonia appeared volume overloaded patient with worsening anemia has been admitted to the hospitalist service GI consulted patient transfused 1 unit of packed red blood cell will likely need endoscopic evaluation started on ceftriaxone for decompensated cirrhosis continuing rifaximin and lactulose continue with Carafate adding IV PPI    Interval Followup:   Feeling better, feels like mental status has improved, no more bleeding    Objective   Objective     Vitals:   Temp:  [97.5 °F (36.4 °C)-98.1 °F (36.7 °C)] 97.7 °F (36.5 °C)  Heart Rate:  [63-68] 67  Resp:  [17-18] 18  BP: (108-148)/(47-77) 108/47  Physical Exam   Constitutional: Awake, alert and lucid  Respiratory diminished  Cardiovascular RRR  GI: Abdomen obese, soft mildly tender distended.  Scattered ecchymoses  Extremities: scattered bruising    Result Review    Result Review:  I have personally reviewed the pertinent results from the past 24 hours to 12/3/2024 10:20 EST and agree with these findings:  [x]  Laboratory   CBC          2024    05:57 2024    18:36 2024    05:26 12/3/2024    06:01   CBC   WBC 2.36   2.69  3.34    RBC 2.68   2.93  3.14    Hemoglobin 7.0  8.6  7.7  8.2    Hematocrit 23.4  28.0  25.4  27.6    MCV 87.3   86.7  87.9    MCH 26.1   26.3  26.1    MCHC 29.9    30.3  29.7    RDW 18.5   18.9  19.0    Platelets 42   45  53      BMP          12/1/2024    05:57 12/2/2024    05:26 12/3/2024    06:01   BMP   BUN 26  24  25    Creatinine 1.20  1.14  1.11    Sodium 139  138  138    Potassium 4.1  4.0  4.1    Chloride 107  106  106    CO2 26.6  26.3  24.5    Calcium 8.0  7.7  7.9      LIVER FUNCTION TESTS:      Lab 12/03/24  0601 12/02/24  0526 12/01/24  0557 11/30/24  0544 11/29/24  1837 11/29/24  0531 11/28/24  0522 11/27/24  0523   TOTAL PROTEIN 5.6* 5.0* 5.0* 5.3* 5.4* 5.4*   < > 5.6*   ALBUMIN 2.9* 2.5* 2.6* 2.9* 2.9* 2.9*   < > 2.8*   GLOBULIN  --   --   --   --  2.5  --   --  2.8   ALT (SGPT) 16 13 14 16 17 17   < > 19   AST (SGOT) 39 30 30 37 39 39   < > 43*   BILIRUBIN 1.0 0.9 0.8 1.0 0.8 0.8   < > 1.7*   INDIRECT BILIRUBIN 0.6 0.4 0.4 0.5  --  0.4   < >  --    BILIRUBIN DIRECT 0.4* 0.5* 0.4* 0.5*  --  0.4*   < > 0.7*   ALK PHOS 98 94 97 102 119* 111   < > 123*    < > = values in this interval not displayed.       [x]  Microbiology   Microbiology Results (last 10 days)       Procedure Component Value - Date/Time    Body Fluid Culture - Body Fluid, Peritoneum [[id number]] Collected: 11/27/24 0957    Lab Status: Final result Specimen: Body Fluid from Peritoneum Updated: 11/30/24 0719     Body Fluid Culture No growth at 3 days     Gram Stain Moderate (3+) WBCs seen      No organisms seen    Anaerobic Culture - Body Fluid, Peritoneum [[id number]]  (Normal) Collected: 11/27/24 0957    Lab Status: Final result Specimen: Body Fluid from Peritoneum Updated: 12/02/24 0642     Anaerobic Culture No anaerobes isolated at 5 days              [x]  Radiology CT Abdomen Pelvis Without Contrast    Result Date: 11/29/2024  No significant interval change is seen since the prior CT study from earlier during the same day. No pneumoperitoneum is seen.    Portions of this note were completed with a voice recognition program.  Electronically Signed By-Herberth Arevalo MD On:11/29/2024 9:40 PM       CT Abdomen Pelvis Without Contrast    Addendum Date: 11/29/2024    ADDENDUM #1 By report, the site of paracentesis was in the anterior right mid abdomen. Tiny gas collections seen adjacent to the colon on series 3 images 103 through 115 may be extraluminal. I discussed findings with Mr. Goetz at 1215. I would recommend follow-up CT scan of the abdomen and pelvis be obtained after administration of oral contrast. I would recommend a delay of 4 hours following ministration of IV contrast to optimize evaluation of the colon. IV contrast would also be helpful. Electronically Signed: Yury Starks MD  11/29/2024 12:39 PM EST  Workstation ID: [id number] ORIGINAL REPORT: CT ABDOMEN PELVIS WO CONTRAST Date of Exam: 11/29/2024 11:18 AM EST Indication: Lower Abd pain. Comparison: CT abdomen and pelvis 11/25/2024 Technique: Axial CT images were obtained of the abdomen and pelvis without the administration of contrast. Reconstructed coronal and sagittal images were also obtained. Automated exposure control and iterative construction methods were used. Findings: The lung bases are clear. The liver has a nodular contour consistent with cirrhosis. The right liver lobe is relatively small. The gallbladder is not abnormally distended. No pancreatic or adrenal mass is evident. The spleen is enlarged, measuring 19 cm x 11 cm in greatest AP and  transverse dimension, which is not significantly changed. A small amount of peritoneal fluid is less in comparison to 11/25/2024. The patient is undergone interim paracentesis. Ill-defined increased density in subcutaneous soft tissues is consistent with anasarca. No renal or ureteral stones are seen. There is no evidence of hydronephrosis. The urinary bladder is not abnormally distended. The prostate gland measures 5 cm in greatest transverse dimension. The stomach is not abnormally distended. Bowel loops are of normal caliber. Evaluation of solid organs and bowel is limited without  contrast. Midline hernia defect in the epigastric region measures 2 cm in diameter. Abdominal fat herniating through the defect measures 5.5 cm. Small umbilical hernia containing fat and fluid is stable. Bilateral inguinal hernias are stable. Left inguinal adenopathy is evident, with lymph nodes measuring up to 2 cm in greatest short axis dimension. Degenerative changes are seen in the lower thoracic and lumbar spine. Hardware in the left hemipelvis is consistent with ORIF. Impression: 1.Cirrhotic liver. 2.Splenomegaly, not significantly changed. 3.Small amount of peritoneal fluid, less than on 11/25/2024. The patient is undergone interim paracentesis. Anasarca. 4.Mildly enlarged prostate gland. 5.Left inguinal adenopathy. Electronically Signed: Yury Starks MD  11/29/2024 11:39 AM EST  Workstation ID: [id number]    Result Date: 11/29/2024  Impression: 1.Cirrhotic liver. 2.Splenomegaly, not significantly changed. 3.Small amount of peritoneal fluid, less than on 11/25/2024. The patient is undergone interim paracentesis. Anasarca. 4.Mildly enlarged prostate gland. 5.Left inguinal adenopathy. Electronically Signed: Yury Starks MD  11/29/2024 11:39 AM EST  Workstation ID: [id number]    CT Head Without Contrast    Result Date: 11/29/2024  Impression: No acute intracranial process identified. Electronically Signed: Emanuel Dewey MD  11/29/2024 11:36 AM EST  Workstation ID: [id number]    US Paracentesis    Result Date: 11/27/2024  Impression: Successful ultrasound-guided diagnostic paracentesis without immediate complication.  Electronically Signed: Jimmy Jordan MD  11/27/2024 10:10 AM EST  Workstation ID: [id number]    XR Chest 1 View    Result Date: 11/26/2024  Mild infiltrate or atelectasis at the right base. Electronically Signed: Kye Breaux MD  11/26/2024 5:47 AM EST  Workstation ID: [id number]    CT Abdomen Pelvis With Contrast    Result Date: 11/25/2024  Impression: 1.Small to moderate volume ascites most  pronounced in the right upper and lower quadrants. 2.Cirrhosis with splenomegaly. 3.Cholelithiasis without gallbladder inflammatory changes. 4.Fat and fluid-containing umbilical hernia, similar to prior examination. 5.Increasing simple appearing fluid within the left inguinal canal now measuring 5.4 cm in diameter, likely ascites related. 6.Mild generalized body wall edema. Electronically Signed: Emanuel Dewey MD  11/25/2024 8:48 PM EST  Workstation ID: [id number]       []  EKG/Telemetry   Telemetry Scan   Final Result          []  Cardiology/Vascular   []  Pathology  [x]  Old records  []  Other:    Assessment & Plan   Assessment / Plan     Assessment:    Hematemesis / Suspect acute upper GI bleed  Acute on chronic anemia  Acute decompensated cirrhosis  Status post paracentesis 11/27/24.  130 cc.    Status post GD 11/27/24.  Bleeding treated with argon plasma coagulopathy.  ROMO  Acute on chronic diastolic congestive heart failure  Anasarca  Thrombocytopenia secondary to cirrhosis  Hepatic encephalopathy  CKD stage III  History of TBI  Splenomegaly  Diabetes  History of DVT  Nursing home long-term care resident: [facility]  Bleeding portal hypertensive gastropathy in the setting of thrombocytopenia/elevated INR  Pancytopenia  Code Status: DNR    Plan:    Status post PRBCs.  Keep Hgb greater than 7.  Continue oral lasix.  Continue Aldactone.    Repeat CT abdomen does not show any adverse sequela from recent paracentesis   Appreciate general surgery consult.    Gastroenterology consultation.   Finish IV ceftriaxone for decompensated liver cirrhosis  Fluids/Na restriction.  Continuing lactulose and rifaximin for hepatic encephalopathy  Continue basal, bolus, correction insulin  Wound care consultation  Long-term prognosis poor.  Palliative care consult.  If labs and status stable tomorrow likely discharge back to long-term care facility         VTE Prophylaxis:  Mechanical VTE prophylaxis orders are  present.        CODE STATUS:   Medical Intervention Limits: No intubation (DNI)  Level Of Support Discussed With: Patient; Next of Kin (If No Surrogate)  Code Status (Patient has no pulse and is not breathing): No CPR (Do Not Attempt to Resuscitate)  Medical Interventions (Patient has pulse or is breathing): Limited Support    Electronically signed by Dominick Davila MD, 12/03/24, 12:38 PM EST.

## 2024-12-04 ENCOUNTER — APPOINTMENT (OUTPATIENT)
Dept: GENERAL RADIOLOGY | Facility: HOSPITAL | Age: 59
DRG: 441 | End: 2024-12-04
Payer: MEDICAID

## 2024-12-04 LAB
ALBUMIN SERPL-MCNC: 2.7 G/DL (ref 3.5–5.2)
ANION GAP SERPL CALCULATED.3IONS-SCNC: 4.3 MMOL/L (ref 5–15)
BASOPHILS # BLD AUTO: 0.02 10*3/MM3 (ref 0–0.2)
BASOPHILS NFR BLD AUTO: 0.9 % (ref 0–1.5)
BUN SERPL-MCNC: 26 MG/DL (ref 6–20)
BUN/CREAT SERPL: 23 (ref 7–25)
CALCIUM SPEC-SCNC: 8 MG/DL (ref 8.6–10.5)
CHLORIDE SERPL-SCNC: 105 MMOL/L (ref 98–107)
CO2 SERPL-SCNC: 27.7 MMOL/L (ref 22–29)
CREAT SERPL-MCNC: 1.13 MG/DL (ref 0.76–1.27)
DEPRECATED RDW RBC AUTO: 60.6 FL (ref 37–54)
EGFRCR SERPLBLD CKD-EPI 2021: 75.3 ML/MIN/1.73
EOSINOPHIL # BLD AUTO: 0.09 10*3/MM3 (ref 0–0.4)
EOSINOPHIL NFR BLD AUTO: 3.8 % (ref 0.3–6.2)
ERYTHROCYTE [DISTWIDTH] IN BLOOD BY AUTOMATED COUNT: 18.8 % (ref 12.3–15.4)
GLUCOSE BLDC GLUCOMTR-MCNC: 181 MG/DL (ref 70–99)
GLUCOSE BLDC GLUCOMTR-MCNC: 188 MG/DL (ref 70–99)
GLUCOSE BLDC GLUCOMTR-MCNC: 199 MG/DL (ref 70–99)
GLUCOSE BLDC GLUCOMTR-MCNC: 243 MG/DL (ref 70–99)
GLUCOSE SERPL-MCNC: 214 MG/DL (ref 65–99)
HCT VFR BLD AUTO: 23.1 % (ref 37.5–51)
HGB BLD-MCNC: 7.1 G/DL (ref 13–17.7)
IMM GRANULOCYTES # BLD AUTO: 0.01 10*3/MM3 (ref 0–0.05)
IMM GRANULOCYTES NFR BLD AUTO: 0.4 % (ref 0–0.5)
LYMPHOCYTES # BLD AUTO: 0.59 10*3/MM3 (ref 0.7–3.1)
LYMPHOCYTES NFR BLD AUTO: 25.2 % (ref 19.6–45.3)
MAGNESIUM SERPL-MCNC: 1.6 MG/DL (ref 1.6–2.6)
MCH RBC QN AUTO: 26.9 PG (ref 26.6–33)
MCHC RBC AUTO-ENTMCNC: 30.7 G/DL (ref 31.5–35.7)
MCV RBC AUTO: 87.5 FL (ref 79–97)
MONOCYTES # BLD AUTO: 0.23 10*3/MM3 (ref 0.1–0.9)
MONOCYTES NFR BLD AUTO: 9.8 % (ref 5–12)
NEUTROPHILS NFR BLD AUTO: 1.4 10*3/MM3 (ref 1.7–7)
NEUTROPHILS NFR BLD AUTO: 59.9 % (ref 42.7–76)
NRBC BLD AUTO-RTO: 0 /100 WBC (ref 0–0.2)
PHOSPHATE SERPL-MCNC: 2.4 MG/DL (ref 2.5–4.5)
PLATELET # BLD AUTO: 41 10*3/MM3 (ref 140–450)
PMV BLD AUTO: 10.2 FL (ref 6–12)
POTASSIUM SERPL-SCNC: 3.6 MMOL/L (ref 3.5–5.2)
RBC # BLD AUTO: 2.64 10*6/MM3 (ref 4.14–5.8)
SODIUM SERPL-SCNC: 137 MMOL/L (ref 136–145)
WBC NRBC COR # BLD AUTO: 2.34 10*3/MM3 (ref 3.4–10.8)

## 2024-12-04 PROCEDURE — 80069 RENAL FUNCTION PANEL: CPT | Performed by: INTERNAL MEDICINE

## 2024-12-04 PROCEDURE — 63710000001 INSULIN GLARGINE PER 5 UNITS: Performed by: INTERNAL MEDICINE

## 2024-12-04 PROCEDURE — 86900 BLOOD TYPING SEROLOGIC ABO: CPT

## 2024-12-04 PROCEDURE — 36430 TRANSFUSION BLD/BLD COMPNT: CPT

## 2024-12-04 PROCEDURE — 85025 COMPLETE CBC W/AUTO DIFF WBC: CPT | Performed by: INTERNAL MEDICINE

## 2024-12-04 PROCEDURE — 71045 X-RAY EXAM CHEST 1 VIEW: CPT

## 2024-12-04 PROCEDURE — 25010000002 MORPHINE PER 10 MG: Performed by: STUDENT IN AN ORGANIZED HEALTH CARE EDUCATION/TRAINING PROGRAM

## 2024-12-04 PROCEDURE — 83735 ASSAY OF MAGNESIUM: CPT | Performed by: INTERNAL MEDICINE

## 2024-12-04 PROCEDURE — 25010000002 PIPERACILLIN SOD-TAZOBACTAM PER 1 G: Performed by: FAMILY MEDICINE

## 2024-12-04 PROCEDURE — 63710000001 INSULIN LISPRO (HUMAN) PER 5 UNITS: Performed by: INTERNAL MEDICINE

## 2024-12-04 PROCEDURE — 82948 REAGENT STRIP/BLOOD GLUCOSE: CPT | Performed by: INTERNAL MEDICINE

## 2024-12-04 PROCEDURE — P9016 RBC LEUKOCYTES REDUCED: HCPCS

## 2024-12-04 PROCEDURE — 99233 SBSQ HOSP IP/OBS HIGH 50: CPT | Performed by: INTERNAL MEDICINE

## 2024-12-04 PROCEDURE — 82948 REAGENT STRIP/BLOOD GLUCOSE: CPT

## 2024-12-04 RX ADMIN — LEVETIRACETAM 500 MG: 500 TABLET, FILM COATED ORAL at 08:45

## 2024-12-04 RX ADMIN — LEVETIRACETAM 500 MG: 500 TABLET, FILM COATED ORAL at 20:27

## 2024-12-04 RX ADMIN — INSULIN LISPRO 2 UNITS: 100 INJECTION, SOLUTION INTRAVENOUS; SUBCUTANEOUS at 11:42

## 2024-12-04 RX ADMIN — SUCRALFATE 1 G: 1 TABLET ORAL at 11:42

## 2024-12-04 RX ADMIN — PIPERACILLIN AND TAZOBACTAM 4.5 G: 4; .5 INJECTION, POWDER, FOR SOLUTION INTRAVENOUS; PARENTERAL at 03:56

## 2024-12-04 RX ADMIN — INSULIN GLARGINE 10 UNITS: 100 INJECTION, SOLUTION SUBCUTANEOUS at 20:36

## 2024-12-04 RX ADMIN — OXYCODONE 10 MG: 5 TABLET ORAL at 15:50

## 2024-12-04 RX ADMIN — HYDROCORTISONE ACETATE 1 APPLICATION: 1 CREAM TOPICAL at 08:44

## 2024-12-04 RX ADMIN — Medication 250 MG: at 08:46

## 2024-12-04 RX ADMIN — DICLOFENAC SODIUM 2 G: 10 GEL TOPICAL at 08:47

## 2024-12-04 RX ADMIN — MORPHINE SULFATE 2 MG: 2 INJECTION, SOLUTION INTRAMUSCULAR; INTRAVENOUS at 03:56

## 2024-12-04 RX ADMIN — MORPHINE SULFATE 2 MG: 2 INJECTION, SOLUTION INTRAMUSCULAR; INTRAVENOUS at 20:26

## 2024-12-04 RX ADMIN — CARVEDILOL 25 MG: 25 TABLET, FILM COATED ORAL at 17:17

## 2024-12-04 RX ADMIN — INSULIN LISPRO 2 UNITS: 100 INJECTION, SOLUTION INTRAVENOUS; SUBCUTANEOUS at 08:44

## 2024-12-04 RX ADMIN — BUSPIRONE HYDROCHLORIDE 7.5 MG: 7.5 TABLET ORAL at 08:46

## 2024-12-04 RX ADMIN — FUROSEMIDE 40 MG: 40 TABLET ORAL at 17:17

## 2024-12-04 RX ADMIN — SUCRALFATE 1 G: 1 TABLET ORAL at 20:27

## 2024-12-04 RX ADMIN — BACITRACIN 0.9 G: 500 OINTMENT TOPICAL at 08:44

## 2024-12-04 RX ADMIN — SUCRALFATE 1 G: 1 TABLET ORAL at 17:17

## 2024-12-04 RX ADMIN — ROPINIROLE HYDROCHLORIDE 1 MG: 1 TABLET, FILM COATED ORAL at 20:27

## 2024-12-04 RX ADMIN — LACTULOSE 30 G: 20 SOLUTION ORAL at 17:16

## 2024-12-04 RX ADMIN — BACITRACIN 0.9 G: 500 OINTMENT TOPICAL at 15:48

## 2024-12-04 RX ADMIN — MORPHINE SULFATE 2 MG: 2 INJECTION, SOLUTION INTRAMUSCULAR; INTRAVENOUS at 11:47

## 2024-12-04 RX ADMIN — Medication 10 ML: at 20:26

## 2024-12-04 RX ADMIN — SUCRALFATE 1 G: 1 TABLET ORAL at 08:46

## 2024-12-04 RX ADMIN — OXYCODONE 10 MG: 5 TABLET ORAL at 07:38

## 2024-12-04 RX ADMIN — INSULIN LISPRO 2 UNITS: 100 INJECTION, SOLUTION INTRAVENOUS; SUBCUTANEOUS at 17:16

## 2024-12-04 RX ADMIN — RIFAXIMIN 550 MG: 550 TABLET ORAL at 03:44

## 2024-12-04 RX ADMIN — BUSPIRONE HYDROCHLORIDE 7.5 MG: 7.5 TABLET ORAL at 20:26

## 2024-12-04 RX ADMIN — DICLOFENAC SODIUM 2 G: 10 GEL TOPICAL at 11:42

## 2024-12-04 RX ADMIN — FLUTICASONE PROPIONATE 1 SPRAY: 50 SPRAY, METERED NASAL at 08:48

## 2024-12-04 RX ADMIN — HYDROCORTISONE ACETATE 1 APPLICATION: 1 CREAM TOPICAL at 20:28

## 2024-12-04 RX ADMIN — MINERAL OIL, PETROLATUM: 425; 568 OINTMENT OPHTHALMIC at 20:28

## 2024-12-04 RX ADMIN — INSULIN LISPRO 4 UNITS: 100 INJECTION, SOLUTION INTRAVENOUS; SUBCUTANEOUS at 20:36

## 2024-12-04 RX ADMIN — SERTRALINE HYDROCHLORIDE 100 MG: 100 TABLET ORAL at 20:27

## 2024-12-04 RX ADMIN — PANTOPRAZOLE SODIUM 40 MG: 40 TABLET, DELAYED RELEASE ORAL at 17:17

## 2024-12-04 RX ADMIN — BUSPIRONE HYDROCHLORIDE 7.5 MG: 7.5 TABLET ORAL at 15:48

## 2024-12-04 RX ADMIN — PIPERACILLIN AND TAZOBACTAM 4.5 G: 4; .5 INJECTION, POWDER, FOR SOLUTION INTRAVENOUS; PARENTERAL at 20:05

## 2024-12-04 RX ADMIN — Medication 250 MG: at 20:27

## 2024-12-04 RX ADMIN — DICLOFENAC SODIUM 2 G: 10 GEL TOPICAL at 20:27

## 2024-12-04 RX ADMIN — PANTOPRAZOLE SODIUM 40 MG: 40 TABLET, DELAYED RELEASE ORAL at 08:46

## 2024-12-04 RX ADMIN — PIPERACILLIN AND TAZOBACTAM 4.5 G: 4; .5 INJECTION, POWDER, FOR SOLUTION INTRAVENOUS; PARENTERAL at 11:42

## 2024-12-04 RX ADMIN — CETIRIZINE HYDROCHLORIDE 5 MG: 10 TABLET, FILM COATED ORAL at 20:27

## 2024-12-04 RX ADMIN — DICLOFENAC SODIUM 2 G: 10 GEL TOPICAL at 17:17

## 2024-12-04 RX ADMIN — LACTULOSE 30 G: 20 SOLUTION ORAL at 08:44

## 2024-12-04 RX ADMIN — RIFAXIMIN 550 MG: 550 TABLET ORAL at 15:48

## 2024-12-04 RX ADMIN — BACITRACIN 0.9 G: 500 OINTMENT TOPICAL at 20:27

## 2024-12-04 RX ADMIN — Medication 10 ML: at 08:47
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
## 2024-12-04 NOTE — PLAN OF CARE
Goal Outcome Evaluation:      Patient continues to have pain in lower extremities and was treated with PRN pain medication. VSS and patient continues with ATB therapy at this time. Patient did have a critical lab value this morning and provider made aware. Will continue to follow care plans.

## 2024-12-04 NOTE — PLAN OF CARE
Goal Outcome Evaluation:      Patient received 1 unit blood this shift. Pain medication given as appropriate. No significant changes noted.

## 2024-12-04 NOTE — PLAN OF CARE
Goal Outcome Evaluation: Patient is still very disoriented did not sleep overnight. Vitals stable complaints of shoulder pain see MAR. Stool are frequent and soft. Up at myrtle call light in reach will continue to monitor.                  hide

## 2024-12-04 NOTE — PLAN OF CARE
Goal Outcome Evaluation:     Aox4, pleasant patient. Medicated for c/o pain per orders. Wound care provided as ordered. Refusing bed alarm, educated on fall risk measures. Up ad myrtle in room. Multiple BM noted this shift. Possible dc to nursing home 12/4.

## 2024-12-05 ENCOUNTER — APPOINTMENT (OUTPATIENT)
Dept: CT IMAGING | Facility: HOSPITAL | Age: 59
DRG: 441 | End: 2024-12-05
Payer: MEDICAID

## 2024-12-05 LAB
ALBUMIN SERPL-MCNC: 2.5 G/DL (ref 3.5–5.2)
AMMONIA BLD-SCNC: 61 UMOL/L (ref 16–60)
ANION GAP SERPL CALCULATED.3IONS-SCNC: 5.4 MMOL/L (ref 5–15)
BASOPHILS # BLD AUTO: 0.03 10*3/MM3 (ref 0–0.2)
BASOPHILS NFR BLD AUTO: 1.2 % (ref 0–1.5)
BH BB BLOOD EXPIRATION DATE: NORMAL
BH BB BLOOD TYPE BARCODE: 6200
BH BB DISPENSE STATUS: NORMAL
BH BB PRODUCT CODE: NORMAL
BH BB UNIT NUMBER: NORMAL
BUN SERPL-MCNC: 25 MG/DL (ref 6–20)
BUN/CREAT SERPL: 22.7 (ref 7–25)
CALCIUM SPEC-SCNC: 7.7 MG/DL (ref 8.6–10.5)
CHLORIDE SERPL-SCNC: 109 MMOL/L (ref 98–107)
CO2 SERPL-SCNC: 24.6 MMOL/L (ref 22–29)
CREAT SERPL-MCNC: 1.1 MG/DL (ref 0.76–1.27)
CROSSMATCH INTERPRETATION: NORMAL
DEPRECATED RDW RBC AUTO: 57.2 FL (ref 37–54)
EGFRCR SERPLBLD CKD-EPI 2021: 77.8 ML/MIN/1.73
EOSINOPHIL # BLD AUTO: 0.12 10*3/MM3 (ref 0–0.4)
EOSINOPHIL NFR BLD AUTO: 4.7 % (ref 0.3–6.2)
ERYTHROCYTE [DISTWIDTH] IN BLOOD BY AUTOMATED COUNT: 18.1 % (ref 12.3–15.4)
GLUCOSE BLDC GLUCOMTR-MCNC: 163 MG/DL (ref 70–99)
GLUCOSE BLDC GLUCOMTR-MCNC: 187 MG/DL (ref 70–99)
GLUCOSE BLDC GLUCOMTR-MCNC: 203 MG/DL (ref 70–99)
GLUCOSE BLDC GLUCOMTR-MCNC: 230 MG/DL (ref 70–99)
GLUCOSE SERPL-MCNC: 247 MG/DL (ref 65–99)
HCT VFR BLD AUTO: 25.6 % (ref 37.5–51)
HGB BLD-MCNC: 7.9 G/DL (ref 13–17.7)
IMM GRANULOCYTES # BLD AUTO: 0.01 10*3/MM3 (ref 0–0.05)
IMM GRANULOCYTES NFR BLD AUTO: 0.4 % (ref 0–0.5)
LYMPHOCYTES # BLD AUTO: 0.51 10*3/MM3 (ref 0.7–3.1)
LYMPHOCYTES NFR BLD AUTO: 20.1 % (ref 19.6–45.3)
MCH RBC QN AUTO: 26.6 PG (ref 26.6–33)
MCHC RBC AUTO-ENTMCNC: 30.9 G/DL (ref 31.5–35.7)
MCV RBC AUTO: 86.2 FL (ref 79–97)
MONOCYTES # BLD AUTO: 0.25 10*3/MM3 (ref 0.1–0.9)
MONOCYTES NFR BLD AUTO: 9.8 % (ref 5–12)
NEUTROPHILS NFR BLD AUTO: 1.62 10*3/MM3 (ref 1.7–7)
NEUTROPHILS NFR BLD AUTO: 63.8 % (ref 42.7–76)
NRBC BLD AUTO-RTO: 0 /100 WBC (ref 0–0.2)
NT-PROBNP SERPL-MCNC: 363.3 PG/ML (ref 0–900)
PHOSPHATE SERPL-MCNC: 2.5 MG/DL (ref 2.5–4.5)
PLATELET # BLD AUTO: 42 10*3/MM3 (ref 140–450)
PMV BLD AUTO: 10.7 FL (ref 6–12)
POTASSIUM SERPL-SCNC: 3.5 MMOL/L (ref 3.5–5.2)
RBC # BLD AUTO: 2.97 10*6/MM3 (ref 4.14–5.8)
SODIUM SERPL-SCNC: 139 MMOL/L (ref 136–145)
UNIT  ABO: NORMAL
UNIT  RH: NORMAL
WBC NRBC COR # BLD AUTO: 2.54 10*3/MM3 (ref 3.4–10.8)

## 2024-12-05 PROCEDURE — 25010000002 MORPHINE PER 10 MG: Performed by: STUDENT IN AN ORGANIZED HEALTH CARE EDUCATION/TRAINING PROGRAM

## 2024-12-05 PROCEDURE — 25510000002 IOHEXOL 12 MG/ML SOLUTION: Performed by: INTERNAL MEDICINE

## 2024-12-05 PROCEDURE — 85025 COMPLETE CBC W/AUTO DIFF WBC: CPT | Performed by: INTERNAL MEDICINE

## 2024-12-05 PROCEDURE — 25010000002 FUROSEMIDE PER 20 MG: Performed by: INTERNAL MEDICINE

## 2024-12-05 PROCEDURE — 82948 REAGENT STRIP/BLOOD GLUCOSE: CPT

## 2024-12-05 PROCEDURE — 80069 RENAL FUNCTION PANEL: CPT | Performed by: INTERNAL MEDICINE

## 2024-12-05 PROCEDURE — 74176 CT ABD & PELVIS W/O CONTRAST: CPT

## 2024-12-05 PROCEDURE — 99233 SBSQ HOSP IP/OBS HIGH 50: CPT | Performed by: INTERNAL MEDICINE

## 2024-12-05 PROCEDURE — 82140 ASSAY OF AMMONIA: CPT | Performed by: INTERNAL MEDICINE

## 2024-12-05 PROCEDURE — 83880 ASSAY OF NATRIURETIC PEPTIDE: CPT | Performed by: INTERNAL MEDICINE

## 2024-12-05 PROCEDURE — 63710000001 INSULIN GLARGINE PER 5 UNITS: Performed by: INTERNAL MEDICINE

## 2024-12-05 PROCEDURE — 25010000002 PIPERACILLIN SOD-TAZOBACTAM PER 1 G: Performed by: FAMILY MEDICINE

## 2024-12-05 PROCEDURE — 63710000001 INSULIN LISPRO (HUMAN) PER 5 UNITS: Performed by: INTERNAL MEDICINE

## 2024-12-05 PROCEDURE — 82948 REAGENT STRIP/BLOOD GLUCOSE: CPT | Performed by: INTERNAL MEDICINE

## 2024-12-05 RX ORDER — CARVEDILOL 12.5 MG/1
12.5 TABLET ORAL 2 TIMES DAILY WITH MEALS
Status: DISCONTINUED | OUTPATIENT
Start: 2024-12-05 | End: 2024-12-09 | Stop reason: HOSPADM

## 2024-12-05 RX ORDER — FUROSEMIDE 10 MG/ML
40 INJECTION INTRAMUSCULAR; INTRAVENOUS
Status: DISCONTINUED | OUTPATIENT
Start: 2024-12-05 | End: 2024-12-07

## 2024-12-05 RX ADMIN — LEVETIRACETAM 500 MG: 500 TABLET, FILM COATED ORAL at 20:21

## 2024-12-05 RX ADMIN — HYDROCORTISONE ACETATE 1 APPLICATION: 1 CREAM TOPICAL at 08:18

## 2024-12-05 RX ADMIN — CETIRIZINE HYDROCHLORIDE 5 MG: 10 TABLET, FILM COATED ORAL at 20:21

## 2024-12-05 RX ADMIN — FLUTICASONE PROPIONATE 1 SPRAY: 50 SPRAY, METERED NASAL at 08:17

## 2024-12-05 RX ADMIN — Medication 250 MG: at 20:21

## 2024-12-05 RX ADMIN — LEVETIRACETAM 500 MG: 500 TABLET, FILM COATED ORAL at 08:07

## 2024-12-05 RX ADMIN — OXYCODONE 10 MG: 5 TABLET ORAL at 10:54

## 2024-12-05 RX ADMIN — SUCRALFATE 1 G: 1 TABLET ORAL at 08:07

## 2024-12-05 RX ADMIN — INSULIN GLARGINE 10 UNITS: 100 INJECTION, SOLUTION SUBCUTANEOUS at 20:21

## 2024-12-05 RX ADMIN — SUCRALFATE 1 G: 1 TABLET ORAL at 17:51

## 2024-12-05 RX ADMIN — BACITRACIN 0.9 G: 500 OINTMENT TOPICAL at 20:21

## 2024-12-05 RX ADMIN — INSULIN LISPRO 2 UNITS: 100 INJECTION, SOLUTION INTRAVENOUS; SUBCUTANEOUS at 20:21

## 2024-12-05 RX ADMIN — SUCRALFATE 1 G: 1 TABLET ORAL at 20:21

## 2024-12-05 RX ADMIN — OXYCODONE 10 MG: 5 TABLET ORAL at 00:33

## 2024-12-05 RX ADMIN — ROPINIROLE HYDROCHLORIDE 1 MG: 1 TABLET, FILM COATED ORAL at 20:21

## 2024-12-05 RX ADMIN — BUSPIRONE HYDROCHLORIDE 7.5 MG: 7.5 TABLET ORAL at 15:01

## 2024-12-05 RX ADMIN — INSULIN LISPRO 2 UNITS: 100 INJECTION, SOLUTION INTRAVENOUS; SUBCUTANEOUS at 08:07

## 2024-12-05 RX ADMIN — DICLOFENAC SODIUM 2 G: 10 GEL TOPICAL at 20:26

## 2024-12-05 RX ADMIN — PANTOPRAZOLE SODIUM 40 MG: 40 TABLET, DELAYED RELEASE ORAL at 08:13

## 2024-12-05 RX ADMIN — CARVEDILOL 12.5 MG: 12.5 TABLET, FILM COATED ORAL at 08:13

## 2024-12-05 RX ADMIN — INSULIN LISPRO 4 UNITS: 100 INJECTION, SOLUTION INTRAVENOUS; SUBCUTANEOUS at 11:54

## 2024-12-05 RX ADMIN — BACITRACIN 0.9 G: 500 OINTMENT TOPICAL at 08:07

## 2024-12-05 RX ADMIN — RIFAXIMIN 550 MG: 550 TABLET ORAL at 15:01

## 2024-12-05 RX ADMIN — BUSPIRONE HYDROCHLORIDE 7.5 MG: 7.5 TABLET ORAL at 08:07

## 2024-12-05 RX ADMIN — Medication 250 MG: at 08:07

## 2024-12-05 RX ADMIN — DICLOFENAC SODIUM 2 G: 10 GEL TOPICAL at 17:53

## 2024-12-05 RX ADMIN — FUROSEMIDE 40 MG: 10 INJECTION, SOLUTION INTRAMUSCULAR; INTRAVENOUS at 17:51

## 2024-12-05 RX ADMIN — MORPHINE SULFATE 2 MG: 2 INJECTION, SOLUTION INTRAMUSCULAR; INTRAVENOUS at 03:32

## 2024-12-05 RX ADMIN — RIFAXIMIN 550 MG: 550 TABLET ORAL at 03:19

## 2024-12-05 RX ADMIN — DICLOFENAC SODIUM 2 G: 10 GEL TOPICAL at 11:54

## 2024-12-05 RX ADMIN — BUSPIRONE HYDROCHLORIDE 7.5 MG: 7.5 TABLET ORAL at 21:22

## 2024-12-05 RX ADMIN — HYDROCORTISONE ACETATE 1 APPLICATION: 1 CREAM TOPICAL at 20:26

## 2024-12-05 RX ADMIN — SERTRALINE HYDROCHLORIDE 100 MG: 100 TABLET ORAL at 20:21

## 2024-12-05 RX ADMIN — LACTULOSE 30 G: 20 SOLUTION ORAL at 08:06

## 2024-12-05 RX ADMIN — PANTOPRAZOLE SODIUM 40 MG: 40 TABLET, DELAYED RELEASE ORAL at 17:51

## 2024-12-05 RX ADMIN — MORPHINE SULFATE 2 MG: 2 INJECTION, SOLUTION INTRAMUSCULAR; INTRAVENOUS at 15:01

## 2024-12-05 RX ADMIN — DICLOFENAC SODIUM 2 G: 10 GEL TOPICAL at 08:17

## 2024-12-05 RX ADMIN — Medication 10 ML: at 20:27

## 2024-12-05 RX ADMIN — OXYCODONE 10 MG: 5 TABLET ORAL at 20:21

## 2024-12-05 RX ADMIN — CARVEDILOL 12.5 MG: 12.5 TABLET, FILM COATED ORAL at 17:51

## 2024-12-05 RX ADMIN — PIPERACILLIN AND TAZOBACTAM 4.5 G: 4; .5 INJECTION, POWDER, FOR SOLUTION INTRAVENOUS; PARENTERAL at 11:54

## 2024-12-05 RX ADMIN — IOHEXOL 500 ML: 12 SOLUTION ORAL at 17:53

## 2024-12-05 RX ADMIN — MORPHINE SULFATE 2 MG: 2 INJECTION, SOLUTION INTRAMUSCULAR; INTRAVENOUS at 08:07

## 2024-12-05 RX ADMIN — PIPERACILLIN AND TAZOBACTAM 4.5 G: 4; .5 INJECTION, POWDER, FOR SOLUTION INTRAVENOUS; PARENTERAL at 18:49

## 2024-12-05 RX ADMIN — LACTULOSE 30 G: 20 SOLUTION ORAL at 11:54

## 2024-12-05 RX ADMIN — Medication 10 ML: at 08:18

## 2024-12-05 RX ADMIN — FUROSEMIDE 40 MG: 40 TABLET ORAL at 08:07

## 2024-12-05 RX ADMIN — BACITRACIN 0.9 G: 500 OINTMENT TOPICAL at 15:01

## 2024-12-05 RX ADMIN — SUCRALFATE 1 G: 1 TABLET ORAL at 11:54

## 2024-12-05 RX ADMIN — PIPERACILLIN AND TAZOBACTAM 4.5 G: 4; .5 INJECTION, POWDER, FOR SOLUTION INTRAVENOUS; PARENTERAL at 03:19

## 2024-12-05 RX ADMIN — INSULIN LISPRO 4 UNITS: 100 INJECTION, SOLUTION INTRAVENOUS; SUBCUTANEOUS at 17:51

## 2024-12-05 NOTE — PROGRESS NOTES
[facility]   Hospitalist Progress Note  Date: 2024  Patient Name: Nic Nicolas  : 1965  MRN: [id number]  Date of admission: 2024      Subjective   Subjective     Chief Complaint: Shortness of breath confusion hematemesis    Summary: Patient is a 59-year-old male past medical history significant for cirrhosis, TBI, diabetes, hypertension, hyperlipidemia, CHF, CKD, asthma, obesity, recurrent hepatic encephalopathy that presents to the emergency department after being seen by his gastroenterologist for confusion and unsteadiness shortness of breath and a week of hematemesis patient evaluated emergency department was encephalopathic with elevated ammonia appeared volume overloaded patient with worsening anemia has been admitted to the hospitalist service GI consulted patient transfused 1 unit of packed red blood cell will likely need endoscopic evaluation started on ceftriaxone for decompensated cirrhosis continuing rifaximin and lactulose continue with Carafate adding IV PPI    Interval Followup:   Hemoglobin down to 7.1 today, denies any obvious bleeding to says he does not feel quite right but cannot but his finger on    Objective   Objective     Vitals:   Temp:  [97.3 °F (36.3 °C)-98.3 °F (36.8 °C)] 97.5 °F (36.4 °C)  Heart Rate:  [60-71] 64  Resp:  [18-20] 18  BP: (103-128)/(47-66) 125/61  Physical Exam   Constitutional: Awake, alert   Respiratory diminished  Cardiovascular RRR  GI: Abdomen obese, soft mildly tender distended.  Scattered ecchymoses  Extremities: scattered bruising    Result Review    Result Review:  I have personally reviewed the pertinent results from the past 24 hours to 2024 19:27 EST and agree with these findings:  [x]  Laboratory   CBC          2024    05:26 12/3/2024    06:01 2024    06:18   CBC   WBC 2.69  3.34  2.34    RBC 2.93  3.14  2.64    Hemoglobin 7.7  8.2  7.1    Hematocrit 25.4  27.6  23.1    MCV 86.7  87.9  87.5    MCH 26.3  26.1  26.9     MCHC 30.3  29.7  30.7    RDW 18.9  19.0  18.8    Platelets 45  53  41      BMP          12/2/2024    05:26 12/3/2024    06:01 12/4/2024    06:18   BMP   BUN 24  25  26    Creatinine 1.14  1.11  1.13    Sodium 138  138  137    Potassium 4.0  4.1  3.6    Chloride 106  106  105    CO2 26.3  24.5  27.7    Calcium 7.7  7.9  8.0      LIVER FUNCTION TESTS:      Lab 12/04/24  0618 12/03/24  0601 12/02/24  0526 12/01/24  0557 11/30/24  0544 11/29/24  1837 11/29/24  0531   TOTAL PROTEIN  --  5.6* 5.0* 5.0* 5.3* 5.4* 5.4*   ALBUMIN 2.7* 2.9* 2.5* 2.6* 2.9* 2.9* 2.9*   GLOBULIN  --   --   --   --   --  2.5  --    ALT (SGPT)  --  16 13 14 16 17 17   AST (SGOT)  --  39 30 30 37 39 39   BILIRUBIN  --  1.0 0.9 0.8 1.0 0.8 0.8   INDIRECT BILIRUBIN  --  0.6 0.4 0.4 0.5  --  0.4   BILIRUBIN DIRECT  --  0.4* 0.5* 0.4* 0.5*  --  0.4*   ALK PHOS  --  98 94 97 102 119* 111       [x]  Microbiology   Microbiology Results (last 10 days)       Procedure Component Value - Date/Time    Body Fluid Culture - Body Fluid, Peritoneum [[id number]] Collected: 11/27/24 0957    Lab Status: Final result Specimen: Body Fluid from Peritoneum Updated: 11/30/24 0719     Body Fluid Culture No growth at 3 days     Gram Stain Moderate (3+) WBCs seen      No organisms seen    Anaerobic Culture - Body Fluid, Peritoneum [[id number]]  (Normal) Collected: 11/27/24 0957    Lab Status: Final result Specimen: Body Fluid from Peritoneum Updated: 12/02/24 0642     Anaerobic Culture No anaerobes isolated at 5 days              [x]  Radiology CT Abdomen Pelvis Without Contrast    Result Date: 11/29/2024  No significant interval change is seen since the prior CT study from earlier during the same day. No pneumoperitoneum is seen.    Portions of this note were completed with a voice recognition program.  Electronically Signed By-Herberth Arevalo MD On:11/29/2024 9:40 PM      CT Abdomen Pelvis Without Contrast    Addendum Date: 11/29/2024    ADDENDUM #1 By report, the site of  paracentesis was in the anterior right mid abdomen. Tiny gas collections seen adjacent to the colon on series 3 images 103 through 115 may be extraluminal. I discussed findings with Mr. Goetz at 1215. I would recommend follow-up CT scan of the abdomen and pelvis be obtained after administration of oral contrast. I would recommend a delay of 4 hours following ministration of IV contrast to optimize evaluation of the colon. IV contrast would also be helpful. Electronically Signed: Yury Starks MD  11/29/2024 12:39 PM EST  Workstation ID: [id number] ORIGINAL REPORT: CT ABDOMEN PELVIS WO CONTRAST Date of Exam: 11/29/2024 11:18 AM EST Indication: Lower Abd pain. Comparison: CT abdomen and pelvis 11/25/2024 Technique: Axial CT images were obtained of the abdomen and pelvis without the administration of contrast. Reconstructed coronal and sagittal images were also obtained. Automated exposure control and iterative construction methods were used. Findings: The lung bases are clear. The liver has a nodular contour consistent with cirrhosis. The right liver lobe is relatively small. The gallbladder is not abnormally distended. No pancreatic or adrenal mass is evident. The spleen is enlarged, measuring 19 cm x 11 cm in greatest AP and  transverse dimension, which is not significantly changed. A small amount of peritoneal fluid is less in comparison to 11/25/2024. The patient is undergone interim paracentesis. Ill-defined increased density in subcutaneous soft tissues is consistent with anasarca. No renal or ureteral stones are seen. There is no evidence of hydronephrosis. The urinary bladder is not abnormally distended. The prostate gland measures 5 cm in greatest transverse dimension. The stomach is not abnormally distended. Bowel loops are of normal caliber. Evaluation of solid organs and bowel is limited without contrast. Midline hernia defect in the epigastric region measures 2 cm in diameter. Abdominal fat herniating  through the defect measures 5.5 cm. Small umbilical hernia containing fat and fluid is stable. Bilateral inguinal hernias are stable. Left inguinal adenopathy is evident, with lymph nodes measuring up to 2 cm in greatest short axis dimension. Degenerative changes are seen in the lower thoracic and lumbar spine. Hardware in the left hemipelvis is consistent with ORIF. Impression: 1.Cirrhotic liver. 2.Splenomegaly, not significantly changed. 3.Small amount of peritoneal fluid, less than on 11/25/2024. The patient is undergone interim paracentesis. Anasarca. 4.Mildly enlarged prostate gland. 5.Left inguinal adenopathy. Electronically Signed: Yury Starks MD  11/29/2024 11:39 AM EST  Workstation ID: [id number]    Result Date: 11/29/2024  Impression: 1.Cirrhotic liver. 2.Splenomegaly, not significantly changed. 3.Small amount of peritoneal fluid, less than on 11/25/2024. The patient is undergone interim paracentesis. Anasarca. 4.Mildly enlarged prostate gland. 5.Left inguinal adenopathy. Electronically Signed: Yury Starks MD  11/29/2024 11:39 AM EST  Workstation ID: [id number]    CT Head Without Contrast    Result Date: 11/29/2024  Impression: No acute intracranial process identified. Electronically Signed: Emanuel Dewey MD  11/29/2024 11:36 AM EST  Workstation ID: [id number]    US Paracentesis    Result Date: 11/27/2024  Impression: Successful ultrasound-guided diagnostic paracentesis without immediate complication.  Electronically Signed: Jimmy Jordan MD  11/27/2024 10:10 AM EST  Workstation ID: [id number]       []  EKG/Telemetry   Telemetry Scan   Final Result          []  Cardiology/Vascular   []  Pathology  [x]  Old records  []  Other:    Assessment & Plan   Assessment / Plan     Assessment:    Hematemesis / Suspect acute upper GI bleed  Acute on chronic anemia  Acute decompensated cirrhosis  Status post paracentesis 11/27/24.  130 cc.    Status post GD 11/27/24.  Bleeding treated with argon plasma  coagulopathy.  ROMO  Acute on chronic diastolic congestive heart failure  Anasarca  Thrombocytopenia secondary to cirrhosis  Hepatic encephalopathy  CKD stage III  History of TBI  Splenomegaly  Diabetes  History of DVT  Nursing home long-term care resident: Helen Burgos  Bleeding portal hypertensive gastropathy in the setting of thrombocytopenia/elevated INR  Pancytopenia  Code Status: DNR    Plan:    Status post PRBCs.  Keep Hgb greater than 7.  Continue oral lasix.  Continue Aldactone.    Repeat CT abdomen does not show any adverse sequela from recent paracentesis   Appreciate general surgery consult.    Gastroenterology consultation.   Finish IV ceftriaxone for decompensated liver cirrhosis  Fluids/Na restriction.  Continuing lactulose and rifaximin for hepatic encephalopathy  Continue basal, bolus, correction insulin  Wound care consultation  Long-term prognosis poor.  Palliative care consult.  Check a chest x-ray and ammonia         VTE Prophylaxis:  Mechanical VTE prophylaxis orders are present.        CODE STATUS:   Medical Intervention Limits: No intubation (DNI)  Level Of Support Discussed With: Patient; Next of Kin (If No Surrogate)  Code Status (Patient has no pulse and is not breathing): No CPR (Do Not Attempt to Resuscitate)  Medical Interventions (Patient has pulse or is breathing): Limited Support    Electronically signed by Dominick Davila MD, 12/04/24  y 19:27 EST

## 2024-12-05 NOTE — PLAN OF CARE
Goal Outcome Evaluation:  Plan of Care Reviewed With: patient        A & O x 4, forgetfulness, pain controlled with medication per order. Appears to be comfortable in bed sleeping.

## 2024-12-05 NOTE — PLAN OF CARE
Goal Outcome Evaluation:         Patient alert and oriented x 4 throughout shift. Medicated for pain per MAR. Patient up ad myrtle in room. No new issues at this time.

## 2024-12-05 NOTE — PROGRESS NOTES
[facility]   Hospitalist Progress Note  Date: 2024  Patient Name: Nic Nicolas  : 1965  MRN: [id number]  Date of admission: 2024      Subjective   Subjective     Chief Complaint: Shortness of breath confusion hematemesis    Summary: Patient is a 59-year-old male past medical history significant for cirrhosis, TBI, diabetes, hypertension, hyperlipidemia, CHF, CKD, asthma, obesity, recurrent hepatic encephalopathy that presents to the emergency department after being seen by his gastroenterologist for confusion and unsteadiness shortness of breath and a week of hematemesis patient evaluated emergency department was encephalopathic with elevated ammonia appeared volume overloaded patient with worsening anemia has been admitted to the hospitalist service GI consulted patient transfused 1 unit of packed red blood cell will likely need endoscopic evaluation started on ceftriaxone for decompensated cirrhosis continuing rifaximin and lactulose continue with Carafate adding IV PPI    Interval Followup:   Complains of some bilateral abd pain since paracentesis, hasn't really mentioned it to me before today.     Objective   Objective     Vitals:   Temp:  [97.7 °F (36.5 °C)-98.1 °F (36.7 °C)] 97.9 °F (36.6 °C)  Heart Rate:  [64-70] 69  Resp:  [18-20] 18  BP: (101-139)/(47-67) 110/52  Physical Exam   Constitutional: Awake, alert   Respiratory diminished  Cardiovascular RRR  GI: Abdomen obese, soft mildly tender distended.  Scattered ecchymoses  Extremities: scattered bruising    Result Review    Result Review:  I have personally reviewed the pertinent results from the past 24 hours to 2024 17:22 EST and agree with these findings:  [x]  Laboratory   CBC          12/3/2024    06:01 2024    06:18 2024    05:27   CBC   WBC 3.34  2.34  2.54    RBC 3.14  2.64  2.97    Hemoglobin 8.2  7.1  7.9    Hematocrit 27.6  23.1  25.6    MCV 87.9  87.5  86.2    MCH 26.1  26.9  26.6    MCHC 29.7  30.7   30.9    RDW 19.0  18.8  18.1    Platelets 53  41  42      BMP          12/3/2024    06:01 12/4/2024    06:18 12/5/2024    05:27   BMP   BUN 25  26  25    Creatinine 1.11  1.13  1.10    Sodium 138  137  139    Potassium 4.1  3.6  3.5    Chloride 106  105  109    CO2 24.5  27.7  24.6    Calcium 7.9  8.0  7.7      LIVER FUNCTION TESTS:      Lab 12/05/24 0527 12/04/24 0618 12/03/24  0601 12/02/24  0526 12/01/24  0557 11/30/24  0544 11/29/24  1837 11/29/24  0531   TOTAL PROTEIN  --   --  5.6* 5.0* 5.0* 5.3* 5.4* 5.4*   ALBUMIN 2.5* 2.7* 2.9* 2.5* 2.6* 2.9* 2.9* 2.9*   GLOBULIN  --   --   --   --   --   --  2.5  --    ALT (SGPT)  --   --  16 13 14 16 17 17   AST (SGOT)  --   --  39 30 30 37 39 39   BILIRUBIN  --   --  1.0 0.9 0.8 1.0 0.8 0.8   INDIRECT BILIRUBIN  --   --  0.6 0.4 0.4 0.5  --  0.4   BILIRUBIN DIRECT  --   --  0.4* 0.5* 0.4* 0.5*  --  0.4*   ALK PHOS  --   --  98 94 97 102 119* 111       [x]  Microbiology   Microbiology Results (last 10 days)       Procedure Component Value - Date/Time    Body Fluid Culture - Body Fluid, Peritoneum [[id number]] Collected: 11/27/24 0957    Lab Status: Final result Specimen: Body Fluid from Peritoneum Updated: 11/30/24 0719     Body Fluid Culture No growth at 3 days     Gram Stain Moderate (3+) WBCs seen      No organisms seen    Anaerobic Culture - Body Fluid, Peritoneum [[id number]]  (Normal) Collected: 11/27/24 0957    Lab Status: Final result Specimen: Body Fluid from Peritoneum Updated: 12/02/24 0642     Anaerobic Culture No anaerobes isolated at 5 days              [x]  Radiology XR Chest 1 View    Result Date: 12/4/2024  Impression: Findings concerning for pulmonary edema versus atypical/multifocal pneumonia. Electronically Signed: Wan Luque DO  12/4/2024 9:12 PM EST  Workstation ID: [id number]    CT Abdomen Pelvis Without Contrast    Result Date: 11/29/2024  No significant interval change is seen since the prior CT study from earlier during the same day. No  pneumoperitoneum is seen.    Portions of this note were completed with a voice recognition program.  Electronically Signed By-Herberth Arevalo MD On:11/29/2024 9:40 PM      CT Abdomen Pelvis Without Contrast    Addendum Date: 11/29/2024    ADDENDUM #1 By report, the site of paracentesis was in the anterior right mid abdomen. Tiny gas collections seen adjacent to the colon on series 3 images 103 through 115 may be extraluminal. I discussed findings with Mr. Goetz at 1215. I would recommend follow-up CT scan of the abdomen and pelvis be obtained after administration of oral contrast. I would recommend a delay of 4 hours following ministration of IV contrast to optimize evaluation of the colon. IV contrast would also be helpful. Electronically Signed: Yury Starks MD  11/29/2024 12:39 PM EST  Workstation ID: [id number] ORIGINAL REPORT: CT ABDOMEN PELVIS WO CONTRAST Date of Exam: 11/29/2024 11:18 AM EST Indication: Lower Abd pain. Comparison: CT abdomen and pelvis 11/25/2024 Technique: Axial CT images were obtained of the abdomen and pelvis without the administration of contrast. Reconstructed coronal and sagittal images were also obtained. Automated exposure control and iterative construction methods were used. Findings: The lung bases are clear. The liver has a nodular contour consistent with cirrhosis. The right liver lobe is relatively small. The gallbladder is not abnormally distended. No pancreatic or adrenal mass is evident. The spleen is enlarged, measuring 19 cm x 11 cm in greatest AP and  transverse dimension, which is not significantly changed. A small amount of peritoneal fluid is less in comparison to 11/25/2024. The patient is undergone interim paracentesis. Ill-defined increased density in subcutaneous soft tissues is consistent with anasarca. No renal or ureteral stones are seen. There is no evidence of hydronephrosis. The urinary bladder is not abnormally distended. The prostate gland measures 5 cm in  greatest transverse dimension. The stomach is not abnormally distended. Bowel loops are of normal caliber. Evaluation of solid organs and bowel is limited without contrast. Midline hernia defect in the epigastric region measures 2 cm in diameter. Abdominal fat herniating through the defect measures 5.5 cm. Small umbilical hernia containing fat and fluid is stable. Bilateral inguinal hernias are stable. Left inguinal adenopathy is evident, with lymph nodes measuring up to 2 cm in greatest short axis dimension. Degenerative changes are seen in the lower thoracic and lumbar spine. Hardware in the left hemipelvis is consistent with ORIF. Impression: 1.Cirrhotic liver. 2.Splenomegaly, not significantly changed. 3.Small amount of peritoneal fluid, less than on 11/25/2024. The patient is undergone interim paracentesis. Anasarca. 4.Mildly enlarged prostate gland. 5.Left inguinal adenopathy. Electronically Signed: Yury Starks MD  11/29/2024 11:39 AM EST  Workstation ID: [id number]    Result Date: 11/29/2024  Impression: 1.Cirrhotic liver. 2.Splenomegaly, not significantly changed. 3.Small amount of peritoneal fluid, less than on 11/25/2024. The patient is undergone interim paracentesis. Anasarca. 4.Mildly enlarged prostate gland. 5.Left inguinal adenopathy. Electronically Signed: Yury Starks MD  11/29/2024 11:39 AM EST  Workstation ID: [id number]    CT Head Without Contrast    Result Date: 11/29/2024  Impression: No acute intracranial process identified. Electronically Signed: Emanuel Dewey MD  11/29/2024 11:36 AM EST  Workstation ID: [id number]       []  EKG/Telemetry   Telemetry Scan   Final Result          []  Cardiology/Vascular   []  Pathology  [x]  Old records  []  Other:    Assessment & Plan   Assessment / Plan     Assessment:    Hematemesis / Suspect acute upper GI bleed  Acute on chronic anemia  Acute decompensated cirrhosis  Status post paracentesis 11/27/24.  130 cc.    Status post GD 11/27/24.  Bleeding treated  with argon plasma coagulopathy.  ROMO  Acute on chronic diastolic congestive heart failure  Anasarca  Thrombocytopenia secondary to cirrhosis  Hepatic encephalopathy  CKD stage III  History of TBI  Splenomegaly  Diabetes  History of DVT  Nursing home long-term care resident: Helen Burgos  Bleeding portal hypertensive gastropathy in the setting of thrombocytopenia/elevated INR  Pancytopenia  Code Status: DNR    Plan:    Status post PRBCs.  Keep Hgb greater than 7.  Will give IV lasix and decrease coreg  Appreciate general surgery consult.    Gastroenterology consultation.   Fluids/Na restriction.  Continuing lactulose and rifaximin for hepatic encephalopathy  Continue basal, bolus, correction insulin  Wound care consultation  Long-term prognosis poor.  Palliative care consult.  CT abd pelvis for abd pain      VTE Prophylaxis:  Mechanical VTE prophylaxis orders are present.        CODE STATUS:   Medical Intervention Limits: No intubation (DNI)  Level Of Support Discussed With: Patient; Next of Kin (If No Surrogate)  Code Status (Patient has no pulse and is not breathing): No CPR (Do Not Attempt to Resuscitate)  Medical Interventions (Patient has pulse or is breathing): Limited Support    Electronically signed by Dominick Davila MD, 12/05/24, 5:22 PM EST.

## 2024-12-06 LAB
ALBUMIN SERPL-MCNC: 2.6 G/DL (ref 3.5–5.2)
ANION GAP SERPL CALCULATED.3IONS-SCNC: 7.7 MMOL/L (ref 5–15)
ANISOCYTOSIS BLD QL: NORMAL
BASOPHILS # BLD AUTO: 0.02 10*3/MM3 (ref 0–0.2)
BASOPHILS NFR BLD AUTO: 0.8 % (ref 0–1.5)
BUN SERPL-MCNC: 24 MG/DL (ref 6–20)
BUN/CREAT SERPL: 23.1 (ref 7–25)
CALCIUM SPEC-SCNC: 7.8 MG/DL (ref 8.6–10.5)
CHLORIDE SERPL-SCNC: 104 MMOL/L (ref 98–107)
CO2 SERPL-SCNC: 25.3 MMOL/L (ref 22–29)
CREAT SERPL-MCNC: 1.04 MG/DL (ref 0.76–1.27)
DEPRECATED RDW RBC AUTO: 60.3 FL (ref 37–54)
EGFRCR SERPLBLD CKD-EPI 2021: 83.2 ML/MIN/1.73
EOSINOPHIL # BLD AUTO: 0.1 10*3/MM3 (ref 0–0.4)
EOSINOPHIL NFR BLD AUTO: 3.8 % (ref 0.3–6.2)
ERYTHROCYTE [DISTWIDTH] IN BLOOD BY AUTOMATED COUNT: 18.6 % (ref 12.3–15.4)
GLUCOSE BLDC GLUCOMTR-MCNC: 138 MG/DL (ref 70–99)
GLUCOSE BLDC GLUCOMTR-MCNC: 159 MG/DL (ref 70–99)
GLUCOSE BLDC GLUCOMTR-MCNC: 187 MG/DL (ref 70–99)
GLUCOSE BLDC GLUCOMTR-MCNC: 220 MG/DL (ref 70–99)
GLUCOSE SERPL-MCNC: 198 MG/DL (ref 65–99)
HCT VFR BLD AUTO: 26 % (ref 37.5–51)
HGB BLD-MCNC: 8 G/DL (ref 13–17.7)
HYPOCHROMIA BLD QL: NORMAL
IMM GRANULOCYTES # BLD AUTO: 0.01 10*3/MM3 (ref 0–0.05)
IMM GRANULOCYTES NFR BLD AUTO: 0.4 % (ref 0–0.5)
LYMPHOCYTES # BLD AUTO: 0.58 10*3/MM3 (ref 0.7–3.1)
LYMPHOCYTES NFR BLD AUTO: 21.9 % (ref 19.6–45.3)
MAGNESIUM SERPL-MCNC: 1.5 MG/DL (ref 1.6–2.6)
MCH RBC QN AUTO: 26.9 PG (ref 26.6–33)
MCHC RBC AUTO-ENTMCNC: 30.8 G/DL (ref 31.5–35.7)
MCV RBC AUTO: 87.5 FL (ref 79–97)
MONOCYTES # BLD AUTO: 0.26 10*3/MM3 (ref 0.1–0.9)
MONOCYTES NFR BLD AUTO: 9.8 % (ref 5–12)
NEUTROPHILS NFR BLD AUTO: 1.68 10*3/MM3 (ref 1.7–7)
NEUTROPHILS NFR BLD AUTO: 63.3 % (ref 42.7–76)
NRBC BLD AUTO-RTO: 0 /100 WBC (ref 0–0.2)
OVALOCYTES BLD QL SMEAR: NORMAL
PHOSPHATE SERPL-MCNC: 2.3 MG/DL (ref 2.5–4.5)
PLATELET # BLD AUTO: 43 10*3/MM3 (ref 140–450)
PMV BLD AUTO: 9.8 FL (ref 6–12)
POTASSIUM SERPL-SCNC: 3.2 MMOL/L (ref 3.5–5.2)
RBC # BLD AUTO: 2.97 10*6/MM3 (ref 4.14–5.8)
SMALL PLATELETS BLD QL SMEAR: NORMAL
SODIUM SERPL-SCNC: 137 MMOL/L (ref 136–145)
WBC MORPH BLD: NORMAL
WBC NRBC COR # BLD AUTO: 2.65 10*3/MM3 (ref 3.4–10.8)

## 2024-12-06 PROCEDURE — 85007 BL SMEAR W/DIFF WBC COUNT: CPT | Performed by: INTERNAL MEDICINE

## 2024-12-06 PROCEDURE — 63710000001 INSULIN GLARGINE PER 5 UNITS: Performed by: INTERNAL MEDICINE

## 2024-12-06 PROCEDURE — 25010000002 FUROSEMIDE PER 20 MG: Performed by: INTERNAL MEDICINE

## 2024-12-06 PROCEDURE — 25010000002 PIPERACILLIN SOD-TAZOBACTAM PER 1 G: Performed by: FAMILY MEDICINE

## 2024-12-06 PROCEDURE — 25010000002 MORPHINE PER 10 MG: Performed by: STUDENT IN AN ORGANIZED HEALTH CARE EDUCATION/TRAINING PROGRAM

## 2024-12-06 PROCEDURE — 82948 REAGENT STRIP/BLOOD GLUCOSE: CPT | Performed by: INTERNAL MEDICINE

## 2024-12-06 PROCEDURE — 83735 ASSAY OF MAGNESIUM: CPT | Performed by: INTERNAL MEDICINE

## 2024-12-06 PROCEDURE — 82948 REAGENT STRIP/BLOOD GLUCOSE: CPT

## 2024-12-06 PROCEDURE — 85025 COMPLETE CBC W/AUTO DIFF WBC: CPT | Performed by: INTERNAL MEDICINE

## 2024-12-06 PROCEDURE — 99233 SBSQ HOSP IP/OBS HIGH 50: CPT | Performed by: INTERNAL MEDICINE

## 2024-12-06 PROCEDURE — 80069 RENAL FUNCTION PANEL: CPT | Performed by: INTERNAL MEDICINE

## 2024-12-06 PROCEDURE — 63710000001 INSULIN LISPRO (HUMAN) PER 5 UNITS: Performed by: INTERNAL MEDICINE

## 2024-12-06 PROCEDURE — 25010000002 MAGNESIUM SULFATE IN D5W 1G/100ML (PREMIX) 1-5 GM/100ML-% SOLUTION: Performed by: INTERNAL MEDICINE

## 2024-12-06 RX ORDER — MAGNESIUM SULFATE 1 G/100ML
1 INJECTION INTRAVENOUS ONCE
Status: COMPLETED | OUTPATIENT
Start: 2024-12-06 | End: 2024-12-06

## 2024-12-06 RX ORDER — POTASSIUM CHLORIDE 750 MG/1
30 CAPSULE, EXTENDED RELEASE ORAL ONCE
Status: COMPLETED | OUTPATIENT
Start: 2024-12-06 | End: 2024-12-06

## 2024-12-06 RX ADMIN — HYDROCORTISONE ACETATE 1 APPLICATION: 1 CREAM TOPICAL at 21:56

## 2024-12-06 RX ADMIN — HYDROCORTISONE ACETATE 1 APPLICATION: 1 CREAM TOPICAL at 08:55

## 2024-12-06 RX ADMIN — Medication 250 MG: at 21:55

## 2024-12-06 RX ADMIN — Medication 10 ML: at 08:52

## 2024-12-06 RX ADMIN — PANTOPRAZOLE SODIUM 40 MG: 40 TABLET, DELAYED RELEASE ORAL at 18:03

## 2024-12-06 RX ADMIN — CETIRIZINE HYDROCHLORIDE 5 MG: 10 TABLET, FILM COATED ORAL at 21:54

## 2024-12-06 RX ADMIN — OXYCODONE 10 MG: 5 TABLET ORAL at 13:00

## 2024-12-06 RX ADMIN — INSULIN GLARGINE 10 UNITS: 100 INJECTION, SOLUTION SUBCUTANEOUS at 21:55

## 2024-12-06 RX ADMIN — FUROSEMIDE 40 MG: 10 INJECTION, SOLUTION INTRAMUSCULAR; INTRAVENOUS at 18:06

## 2024-12-06 RX ADMIN — LACTULOSE 30 G: 20 SOLUTION ORAL at 21:55

## 2024-12-06 RX ADMIN — MORPHINE SULFATE 2 MG: 2 INJECTION, SOLUTION INTRAMUSCULAR; INTRAVENOUS at 01:04

## 2024-12-06 RX ADMIN — SPIRONOLACTONE 100 MG: 25 TABLET ORAL at 08:53

## 2024-12-06 RX ADMIN — SERTRALINE HYDROCHLORIDE 100 MG: 100 TABLET ORAL at 21:54

## 2024-12-06 RX ADMIN — SUCRALFATE 1 G: 1 TABLET ORAL at 08:53

## 2024-12-06 RX ADMIN — ROPINIROLE HYDROCHLORIDE 1 MG: 1 TABLET, FILM COATED ORAL at 21:55

## 2024-12-06 RX ADMIN — CARVEDILOL 12.5 MG: 12.5 TABLET, FILM COATED ORAL at 18:03

## 2024-12-06 RX ADMIN — Medication 250 MG: at 08:53

## 2024-12-06 RX ADMIN — OXYCODONE 10 MG: 5 TABLET ORAL at 04:22

## 2024-12-06 RX ADMIN — DICLOFENAC SODIUM 2 G: 10 GEL TOPICAL at 08:54

## 2024-12-06 RX ADMIN — Medication 10 ML: at 21:56

## 2024-12-06 RX ADMIN — HYDROXYZINE HYDROCHLORIDE 25 MG: 25 TABLET ORAL at 08:53

## 2024-12-06 RX ADMIN — BACITRACIN 0.9 G: 500 OINTMENT TOPICAL at 08:53

## 2024-12-06 RX ADMIN — BUSPIRONE HYDROCHLORIDE 7.5 MG: 7.5 TABLET ORAL at 16:27

## 2024-12-06 RX ADMIN — BUSPIRONE HYDROCHLORIDE 7.5 MG: 7.5 TABLET ORAL at 21:55

## 2024-12-06 RX ADMIN — LACTULOSE 30 G: 20 SOLUTION ORAL at 08:51

## 2024-12-06 RX ADMIN — INSULIN LISPRO 2 UNITS: 100 INJECTION, SOLUTION INTRAVENOUS; SUBCUTANEOUS at 21:56

## 2024-12-06 RX ADMIN — MORPHINE SULFATE 2 MG: 2 INJECTION, SOLUTION INTRAMUSCULAR; INTRAVENOUS at 23:33

## 2024-12-06 RX ADMIN — OXYCODONE 10 MG: 5 TABLET ORAL at 21:55

## 2024-12-06 RX ADMIN — INSULIN LISPRO 4 UNITS: 100 INJECTION, SOLUTION INTRAVENOUS; SUBCUTANEOUS at 18:03

## 2024-12-06 RX ADMIN — PANTOPRAZOLE SODIUM 40 MG: 40 TABLET, DELAYED RELEASE ORAL at 08:53

## 2024-12-06 RX ADMIN — SUCRALFATE 1 G: 1 TABLET ORAL at 21:55

## 2024-12-06 RX ADMIN — FUROSEMIDE 40 MG: 10 INJECTION, SOLUTION INTRAMUSCULAR; INTRAVENOUS at 08:52

## 2024-12-06 RX ADMIN — MAGNESIUM SULFATE HEPTAHYDRATE 1 G: 10 INJECTION, SOLUTION INTRAVENOUS at 08:52

## 2024-12-06 RX ADMIN — SUCRALFATE 1 G: 1 TABLET ORAL at 13:01

## 2024-12-06 RX ADMIN — LEVETIRACETAM 500 MG: 500 TABLET, FILM COATED ORAL at 21:55

## 2024-12-06 RX ADMIN — LACTULOSE 30 G: 20 SOLUTION ORAL at 18:03

## 2024-12-06 RX ADMIN — PIPERACILLIN AND TAZOBACTAM 4.5 G: 4; .5 INJECTION, POWDER, FOR SOLUTION INTRAVENOUS; PARENTERAL at 03:17

## 2024-12-06 RX ADMIN — SUCRALFATE 1 G: 1 TABLET ORAL at 18:03

## 2024-12-06 RX ADMIN — INSULIN LISPRO 2 UNITS: 100 INJECTION, SOLUTION INTRAVENOUS; SUBCUTANEOUS at 13:00

## 2024-12-06 RX ADMIN — MORPHINE SULFATE 2 MG: 2 INJECTION, SOLUTION INTRAMUSCULAR; INTRAVENOUS at 18:06

## 2024-12-06 RX ADMIN — RIFAXIMIN 550 MG: 550 TABLET ORAL at 03:17

## 2024-12-06 RX ADMIN — MORPHINE SULFATE 2 MG: 2 INJECTION, SOLUTION INTRAMUSCULAR; INTRAVENOUS at 08:50

## 2024-12-06 RX ADMIN — POTASSIUM CHLORIDE 30 MEQ: 750 CAPSULE, EXTENDED RELEASE ORAL at 08:53

## 2024-12-06 RX ADMIN — CARVEDILOL 12.5 MG: 12.5 TABLET, FILM COATED ORAL at 08:53

## 2024-12-06 RX ADMIN — FLUTICASONE PROPIONATE 1 SPRAY: 50 SPRAY, METERED NASAL at 08:54

## 2024-12-06 RX ADMIN — PIPERACILLIN AND TAZOBACTAM 4.5 G: 4; .5 INJECTION, POWDER, FOR SOLUTION INTRAVENOUS; PARENTERAL at 13:00

## 2024-12-06 RX ADMIN — LEVETIRACETAM 500 MG: 500 TABLET, FILM COATED ORAL at 08:53

## 2024-12-06 RX ADMIN — BACITRACIN 0.9 G: 500 OINTMENT TOPICAL at 21:55

## 2024-12-06 RX ADMIN — LACTULOSE 30 G: 20 SOLUTION ORAL at 13:00

## 2024-12-06 RX ADMIN — BUSPIRONE HYDROCHLORIDE 7.5 MG: 7.5 TABLET ORAL at 08:53

## 2024-12-06 NOTE — PROGRESS NOTES
[facility]   Hospitalist Progress Note  Date: 2024  Patient Name: Nic Nicolas  : 1965  MRN: [id number]  Date of admission: 2024      Subjective   Subjective     Chief Complaint: Shortness of breath confusion hematemesis    Summary: Patient is a 59-year-old male past medical history significant for cirrhosis, TBI, diabetes, hypertension, hyperlipidemia, CHF, CKD, asthma, obesity, recurrent hepatic encephalopathy that presents to the emergency department after being seen by his gastroenterologist for confusion and unsteadiness shortness of breath and a week of hematemesis patient evaluated emergency department was encephalopathic with elevated ammonia appeared volume overloaded patient with worsening anemia has been admitted to the hospitalist service GI consulted patient transfused 1 unit of packed red blood cell will likely need endoscopic evaluation started on ceftriaxone for decompensated cirrhosis continuing rifaximin and lactulose continue with Carafate adding IV PPI    Interval Followup:   Vaguely feels poorly, no specific complaints    Objective   Objective     Vitals:   Temp:  [97.3 °F (36.3 °C)-98.2 °F (36.8 °C)] 97.7 °F (36.5 °C)  Heart Rate:  [63-73] 63  Resp:  [18-20] 20  BP: (108-155)/(42-65) 129/55  Physical Exam   Constitutional: Awake, alert   Respiratory diminished  Cardiovascular RRR  GI: Abdomen obese, soft mildly tender distended.  Scattered ecchymoses  Extremities: scattered bruising    Result Review    Result Review:  I have personally reviewed the pertinent results from the past 24 hours to 2024 17:13 EST and agree with these findings:  [x]  Laboratory   CBC          2024    06:18 2024    05:27 2024    05:45   CBC   WBC 2.34  2.54  2.65    RBC 2.64  2.97  2.97    Hemoglobin 7.1  7.9  8.0    Hematocrit 23.1  25.6  26.0    MCV 87.5  86.2  87.5    MCH 26.9  26.6  26.9    MCHC 30.7  30.9  30.8    RDW 18.8  18.1  18.6    Platelets 41  42  43      BMP           12/4/2024    06:18 12/5/2024    05:27 12/6/2024    05:45   BMP   BUN 26  25  24    Creatinine 1.13  1.10  1.04    Sodium 137  139  137    Potassium 3.6  3.5  3.2    Chloride 105  109  104    CO2 27.7  24.6  25.3    Calcium 8.0  7.7  7.8      LIVER FUNCTION TESTS:      Lab 12/06/24  0545 12/05/24  0527 12/04/24  0618 12/03/24  0601 12/02/24  0526 12/01/24  0557 11/30/24  0544 11/29/24  1837   TOTAL PROTEIN  --   --   --  5.6* 5.0* 5.0* 5.3* 5.4*   ALBUMIN 2.6* 2.5* 2.7* 2.9* 2.5* 2.6* 2.9* 2.9*   GLOBULIN  --   --   --   --   --   --   --  2.5   ALT (SGPT)  --   --   --  16 13 14 16 17   AST (SGOT)  --   --   --  39 30 30 37 39   BILIRUBIN  --   --   --  1.0 0.9 0.8 1.0 0.8   INDIRECT BILIRUBIN  --   --   --  0.6 0.4 0.4 0.5  --    BILIRUBIN DIRECT  --   --   --  0.4* 0.5* 0.4* 0.5*  --    ALK PHOS  --   --   --  98 94 97 102 119*       [x]  Microbiology   Microbiology Results (last 10 days)       Procedure Component Value - Date/Time    Body Fluid Culture - Body Fluid, Peritoneum [[id number]] Collected: 11/27/24 0957    Lab Status: Final result Specimen: Body Fluid from Peritoneum Updated: 11/30/24 0719     Body Fluid Culture No growth at 3 days     Gram Stain Moderate (3+) WBCs seen      No organisms seen    Anaerobic Culture - Body Fluid, Peritoneum [[id number]]  (Normal) Collected: 11/27/24 0957    Lab Status: Final result Specimen: Body Fluid from Peritoneum Updated: 12/02/24 0642     Anaerobic Culture No anaerobes isolated at 5 days              [x]  Radiology CT Abdomen Pelvis Without Contrast    Result Date: 12/5/2024  Increased anasarca and ascites are noted. Otherwise, little interval change is suggested by nonenhanced CT since 11/29/2024. Portions of this note were completed with a voice recognition program. Electronically Signed: Herberth Arevalo MD  12/5/2024 9:15 PM EST  Workstation ID: [id number]    XR Chest 1 View    Result Date: 12/4/2024  Impression: Findings concerning for pulmonary edema versus  atypical/multifocal pneumonia. Electronically Signed: Wan DO Ene  12/4/2024 9:12 PM EST  Workstation ID: [id number]    CT Abdomen Pelvis Without Contrast    Result Date: 11/29/2024  No significant interval change is seen since the prior CT study from earlier during the same day. No pneumoperitoneum is seen.    Portions of this note were completed with a voice recognition program.  Electronically Signed By-Herberth Arevalo MD On:11/29/2024 9:40 PM      CT Abdomen Pelvis Without Contrast    Addendum Date: 11/29/2024    ADDENDUM #1 By report, the site of paracentesis was in the anterior right mid abdomen. Tiny gas collections seen adjacent to the colon on series 3 images 103 through 115 may be extraluminal. I discussed findings with  Bishnu at 1215. I would recommend follow-up CT scan of the abdomen and pelvis be obtained after administration of oral contrast. I would recommend a delay of 4 hours following ministration of IV contrast to optimize evaluation of the colon. IV contrast would also be helpful. Electronically Signed: Yury Starks MD  11/29/2024 12:39 PM EST  Workstation ID: [id number] ORIGINAL REPORT: CT ABDOMEN PELVIS WO CONTRAST Date of Exam: 11/29/2024 11:18 AM EST Indication: Lower Abd pain. Comparison: CT abdomen and pelvis 11/25/2024 Technique: Axial CT images were obtained of the abdomen and pelvis without the administration of contrast. Reconstructed coronal and sagittal images were also obtained. Automated exposure control and iterative construction methods were used. Findings: The lung bases are clear. The liver has a nodular contour consistent with cirrhosis. The right liver lobe is relatively small. The gallbladder is not abnormally distended. No pancreatic or adrenal mass is evident. The spleen is enlarged, measuring 19 cm x 11 cm in greatest AP and  transverse dimension, which is not significantly changed. A small amount of peritoneal fluid is less in comparison to 11/25/2024. The  patient is undergone interim paracentesis. Ill-defined increased density in subcutaneous soft tissues is consistent with anasarca. No renal or ureteral stones are seen. There is no evidence of hydronephrosis. The urinary bladder is not abnormally distended. The prostate gland measures 5 cm in greatest transverse dimension. The stomach is not abnormally distended. Bowel loops are of normal caliber. Evaluation of solid organs and bowel is limited without contrast. Midline hernia defect in the epigastric region measures 2 cm in diameter. Abdominal fat herniating through the defect measures 5.5 cm. Small umbilical hernia containing fat and fluid is stable. Bilateral inguinal hernias are stable. Left inguinal adenopathy is evident, with lymph nodes measuring up to 2 cm in greatest short axis dimension. Degenerative changes are seen in the lower thoracic and lumbar spine. Hardware in the left hemipelvis is consistent with ORIF. Impression: 1.Cirrhotic liver. 2.Splenomegaly, not significantly changed. 3.Small amount of peritoneal fluid, less than on 11/25/2024. The patient is undergone interim paracentesis. Anasarca. 4.Mildly enlarged prostate gland. 5.Left inguinal adenopathy. Electronically Signed: Yury Starks MD  11/29/2024 11:39 AM EST  Workstation ID: [id number]    Result Date: 11/29/2024  Impression: 1.Cirrhotic liver. 2.Splenomegaly, not significantly changed. 3.Small amount of peritoneal fluid, less than on 11/25/2024. The patient is undergone interim paracentesis. Anasarca. 4.Mildly enlarged prostate gland. 5.Left inguinal adenopathy. Electronically Signed: Yury Starks MD  11/29/2024 11:39 AM EST  Workstation ID: [id number]    CT Head Without Contrast    Result Date: 11/29/2024  Impression: No acute intracranial process identified. Electronically Signed: Emanuel Dewey MD  11/29/2024 11:36 AM EST  Workstation ID: [id number]       []  EKG/Telemetry   Telemetry Scan   Final Result          []  Cardiology/Vascular    []  Pathology  [x]  Old records  []  Other:    Assessment & Plan   Assessment / Plan     Assessment:    Hematemesis / Suspect acute upper GI bleed  Acute on chronic anemia  Acute decompensated cirrhosis  Status post paracentesis 11/27/24.  130 cc.    Status post GD 11/27/24.  Bleeding treated with argon plasma coagulopathy.  ROMO  Acute on chronic diastolic congestive heart failure  Anasarca  Thrombocytopenia secondary to cirrhosis  Hepatic encephalopathy  CKD stage III  History of TBI  Splenomegaly  Diabetes  History of DVT  Nursing home long-term care resident: Helen Burgos  Bleeding portal hypertensive gastropathy in the setting of thrombocytopenia/elevated INR  Pancytopenia  Code Status: DNR    Plan:    Status post PRBCs.  Keep Hgb greater than 7.  Will give IV lasix and decrease coreg  Appreciate general surgery consult.    Gastroenterology consultation.   Fluids/Na restriction.  Continuing lactulose and rifaximin for hepatic encephalopathy  Continue basal, bolus, correction insulin  Wound care consultation  Long-term prognosis poor.  Palliative care consult.  CT abd pelvis for abd pain --> no acute findings  Diureses  Replace potassium and magnesium  Labs in am and possibly get back to [facility] Saturday      VTE Prophylaxis:  Mechanical VTE prophylaxis orders are present.        CODE STATUS:   Medical Intervention Limits: No intubation (DNI)  Level Of Support Discussed With: Patient; Next of Kin (If No Surrogate)  Code Status (Patient has no pulse and is not breathing): No CPR (Do Not Attempt to Resuscitate)  Medical Interventions (Patient has pulse or is breathing): Limited Support    Electronically signed by Dominick Davila MD, 12/06/24 17:13 EST

## 2024-12-06 NOTE — CONSULTS
Discharge Planning Assessment  CHUNG Khan     Patient Name: Nic Nicolas  MRN: [id number]  Today's Date: 12/6/2024    Admit Date: 11/25/2024     Discharge Plan       Row Name 12/06/24 1001       Plan    Patient/Family in Agreement with Plan yes    Final Discharge Disposition Code 64 - nursing facility under Medicaid    Final Note Pt discharging back to [facility] today.             Expected Discharge Date and Time       Expected Discharge Date Expected Discharge Time    Dec 6, 2024    JESSENIA Gomez

## 2024-12-06 NOTE — PLAN OF CARE
Goal Outcome Evaluation:   Patient complained of pain and medicated per orders, vitals stable, possible discharge tomorrow.

## 2024-12-06 NOTE — PLAN OF CARE
Goal Outcome Evaluation:         Pt Aox4 throughout the shift, on room air, and up ad-myrtle with the walker. Pt complaint of pain a few times this shift, medicated per MAR. Skin and wound care completed per orders. Blood glucose checked per orders. Pt still refusing bed alarm, educated on utilizing call light, pt verbalized understanding and is compliant. IV ABX maintained per MAR. VSS, no acute changes noted this shift. Pt appears to be in no apparent distress at this time, denies any current needs, and has call light within reach.                                     Patient does not want to take potassium chloride again as it caused such bad diarrhea the last time that he was on it.   Even after stopping it, he still had diarrhea for quite awhile.   Patient does not want to take the powder form either, he does not want to take potassium supplements period.  Patient will try and eat a lot of potassium in his diet and hopefully levels will increase.  Patient has labs next week to follow-up.

## 2024-12-07 ENCOUNTER — APPOINTMENT (OUTPATIENT)
Dept: ULTRASOUND IMAGING | Facility: HOSPITAL | Age: 59
DRG: 441 | End: 2024-12-07
Payer: MEDICAID

## 2024-12-07 LAB
ALBUMIN SERPL-MCNC: 2.8 G/DL (ref 3.5–5.2)
AMMONIA BLD-SCNC: 71 UMOL/L (ref 16–60)
ANION GAP SERPL CALCULATED.3IONS-SCNC: 9.3 MMOL/L (ref 5–15)
BASOPHILS # BLD AUTO: 0.03 10*3/MM3 (ref 0–0.2)
BASOPHILS NFR BLD AUTO: 1 % (ref 0–1.5)
BUN SERPL-MCNC: 23 MG/DL (ref 6–20)
BUN/CREAT SERPL: 22.8 (ref 7–25)
CALCIUM SPEC-SCNC: 7.7 MG/DL (ref 8.6–10.5)
CHLORIDE SERPL-SCNC: 105 MMOL/L (ref 98–107)
CO2 SERPL-SCNC: 23.7 MMOL/L (ref 22–29)
CREAT SERPL-MCNC: 1.01 MG/DL (ref 0.76–1.27)
DEPRECATED RDW RBC AUTO: 60.5 FL (ref 37–54)
EGFRCR SERPLBLD CKD-EPI 2021: 86.2 ML/MIN/1.73
EOSINOPHIL # BLD AUTO: 0.12 10*3/MM3 (ref 0–0.4)
EOSINOPHIL NFR BLD AUTO: 4.1 % (ref 0.3–6.2)
ERYTHROCYTE [DISTWIDTH] IN BLOOD BY AUTOMATED COUNT: 18.6 % (ref 12.3–15.4)
GLUCOSE BLDC GLUCOMTR-MCNC: 181 MG/DL (ref 70–99)
GLUCOSE BLDC GLUCOMTR-MCNC: 190 MG/DL (ref 70–99)
GLUCOSE BLDC GLUCOMTR-MCNC: 196 MG/DL (ref 70–99)
GLUCOSE BLDC GLUCOMTR-MCNC: 217 MG/DL (ref 70–99)
GLUCOSE SERPL-MCNC: 226 MG/DL (ref 65–99)
HCT VFR BLD AUTO: 26.2 % (ref 37.5–51)
HGB BLD-MCNC: 8 G/DL (ref 13–17.7)
IMM GRANULOCYTES # BLD AUTO: 0.02 10*3/MM3 (ref 0–0.05)
IMM GRANULOCYTES NFR BLD AUTO: 0.7 % (ref 0–0.5)
LYMPHOCYTES # BLD AUTO: 0.62 10*3/MM3 (ref 0.7–3.1)
LYMPHOCYTES NFR BLD AUTO: 21.1 % (ref 19.6–45.3)
MCH RBC QN AUTO: 26.8 PG (ref 26.6–33)
MCHC RBC AUTO-ENTMCNC: 30.5 G/DL (ref 31.5–35.7)
MCV RBC AUTO: 87.9 FL (ref 79–97)
MONOCYTES # BLD AUTO: 0.3 10*3/MM3 (ref 0.1–0.9)
MONOCYTES NFR BLD AUTO: 10.2 % (ref 5–12)
NEUTROPHILS NFR BLD AUTO: 1.85 10*3/MM3 (ref 1.7–7)
NEUTROPHILS NFR BLD AUTO: 62.9 % (ref 42.7–76)
NRBC BLD AUTO-RTO: 0 /100 WBC (ref 0–0.2)
PHOSPHATE SERPL-MCNC: 2.3 MG/DL (ref 2.5–4.5)
PLATELET # BLD AUTO: 47 10*3/MM3 (ref 140–450)
POTASSIUM SERPL-SCNC: 3.2 MMOL/L (ref 3.5–5.2)
RBC # BLD AUTO: 2.98 10*6/MM3 (ref 4.14–5.8)
SODIUM SERPL-SCNC: 138 MMOL/L (ref 136–145)
WBC NRBC COR # BLD AUTO: 2.94 10*3/MM3 (ref 3.4–10.8)

## 2024-12-07 PROCEDURE — 63710000001 INSULIN LISPRO (HUMAN) PER 5 UNITS: Performed by: INTERNAL MEDICINE

## 2024-12-07 PROCEDURE — 76705 ECHO EXAM OF ABDOMEN: CPT

## 2024-12-07 PROCEDURE — 82140 ASSAY OF AMMONIA: CPT | Performed by: INTERNAL MEDICINE

## 2024-12-07 PROCEDURE — 25010000002 FUROSEMIDE PER 20 MG: Performed by: INTERNAL MEDICINE

## 2024-12-07 PROCEDURE — 99232 SBSQ HOSP IP/OBS MODERATE 35: CPT | Performed by: STUDENT IN AN ORGANIZED HEALTH CARE EDUCATION/TRAINING PROGRAM

## 2024-12-07 PROCEDURE — 25010000002 BUMETANIDE PER 0.5 MG: Performed by: STUDENT IN AN ORGANIZED HEALTH CARE EDUCATION/TRAINING PROGRAM

## 2024-12-07 PROCEDURE — 25010000002 ONDANSETRON PER 1 MG: Performed by: INTERNAL MEDICINE

## 2024-12-07 PROCEDURE — 82948 REAGENT STRIP/BLOOD GLUCOSE: CPT | Performed by: INTERNAL MEDICINE

## 2024-12-07 PROCEDURE — 82948 REAGENT STRIP/BLOOD GLUCOSE: CPT

## 2024-12-07 PROCEDURE — 25010000002 MORPHINE PER 10 MG: Performed by: STUDENT IN AN ORGANIZED HEALTH CARE EDUCATION/TRAINING PROGRAM

## 2024-12-07 PROCEDURE — 80069 RENAL FUNCTION PANEL: CPT | Performed by: INTERNAL MEDICINE

## 2024-12-07 PROCEDURE — 85025 COMPLETE CBC W/AUTO DIFF WBC: CPT | Performed by: INTERNAL MEDICINE

## 2024-12-07 PROCEDURE — 63710000001 INSULIN GLARGINE PER 5 UNITS: Performed by: INTERNAL MEDICINE

## 2024-12-07 RX ORDER — BUMETANIDE 0.25 MG/ML
2 INJECTION, SOLUTION INTRAMUSCULAR; INTRAVENOUS EVERY 12 HOURS
Status: DISCONTINUED | OUTPATIENT
Start: 2024-12-07 | End: 2024-12-09 | Stop reason: HOSPADM

## 2024-12-07 RX ADMIN — LACTULOSE 30 G: 20 SOLUTION ORAL at 17:39

## 2024-12-07 RX ADMIN — Medication 250 MG: at 08:36

## 2024-12-07 RX ADMIN — PANTOPRAZOLE SODIUM 40 MG: 40 TABLET, DELAYED RELEASE ORAL at 08:36

## 2024-12-07 RX ADMIN — MORPHINE SULFATE 2 MG: 2 INJECTION, SOLUTION INTRAMUSCULAR; INTRAVENOUS at 20:31

## 2024-12-07 RX ADMIN — MORPHINE SULFATE 2 MG: 2 INJECTION, SOLUTION INTRAMUSCULAR; INTRAVENOUS at 04:01

## 2024-12-07 RX ADMIN — DICLOFENAC SODIUM 2 G: 10 GEL TOPICAL at 20:32

## 2024-12-07 RX ADMIN — BUSPIRONE HYDROCHLORIDE 7.5 MG: 7.5 TABLET ORAL at 20:31

## 2024-12-07 RX ADMIN — SERTRALINE HYDROCHLORIDE 100 MG: 100 TABLET ORAL at 20:31

## 2024-12-07 RX ADMIN — SUCRALFATE 1 G: 1 TABLET ORAL at 08:36

## 2024-12-07 RX ADMIN — Medication 10 ML: at 20:32

## 2024-12-07 RX ADMIN — INSULIN LISPRO 4 UNITS: 100 INJECTION, SOLUTION INTRAVENOUS; SUBCUTANEOUS at 20:30

## 2024-12-07 RX ADMIN — LEVETIRACETAM 500 MG: 500 TABLET, FILM COATED ORAL at 08:36

## 2024-12-07 RX ADMIN — BUMETANIDE 2 MG: 0.25 INJECTION INTRAMUSCULAR; INTRAVENOUS at 16:59

## 2024-12-07 RX ADMIN — CARVEDILOL 12.5 MG: 12.5 TABLET, FILM COATED ORAL at 17:39

## 2024-12-07 RX ADMIN — PANTOPRAZOLE SODIUM 40 MG: 40 TABLET, DELAYED RELEASE ORAL at 16:59

## 2024-12-07 RX ADMIN — BACITRACIN 0.9 G: 500 OINTMENT TOPICAL at 08:36

## 2024-12-07 RX ADMIN — INSULIN LISPRO 2 UNITS: 100 INJECTION, SOLUTION INTRAVENOUS; SUBCUTANEOUS at 08:36

## 2024-12-07 RX ADMIN — BUSPIRONE HYDROCHLORIDE 7.5 MG: 7.5 TABLET ORAL at 16:59

## 2024-12-07 RX ADMIN — Medication 10 ML: at 08:38

## 2024-12-07 RX ADMIN — CETIRIZINE HYDROCHLORIDE 5 MG: 10 TABLET, FILM COATED ORAL at 20:31

## 2024-12-07 RX ADMIN — MORPHINE SULFATE 2 MG: 2 INJECTION, SOLUTION INTRAMUSCULAR; INTRAVENOUS at 12:36

## 2024-12-07 RX ADMIN — LEVETIRACETAM 500 MG: 500 TABLET, FILM COATED ORAL at 20:31

## 2024-12-07 RX ADMIN — ONDANSETRON 4 MG: 2 INJECTION INTRAMUSCULAR; INTRAVENOUS at 20:41

## 2024-12-07 RX ADMIN — FLUTICASONE PROPIONATE 1 SPRAY: 50 SPRAY, METERED NASAL at 08:40

## 2024-12-07 RX ADMIN — LACTULOSE 30 G: 20 SOLUTION ORAL at 20:30

## 2024-12-07 RX ADMIN — MORPHINE SULFATE 2 MG: 2 INJECTION, SOLUTION INTRAMUSCULAR; INTRAVENOUS at 08:36

## 2024-12-07 RX ADMIN — DICLOFENAC SODIUM 2 G: 10 GEL TOPICAL at 08:39

## 2024-12-07 RX ADMIN — HYDROCORTISONE ACETATE 1 APPLICATION: 1 CREAM TOPICAL at 08:40

## 2024-12-07 RX ADMIN — INSULIN GLARGINE 10 UNITS: 100 INJECTION, SOLUTION SUBCUTANEOUS at 20:30

## 2024-12-07 RX ADMIN — INSULIN LISPRO 2 UNITS: 100 INJECTION, SOLUTION INTRAVENOUS; SUBCUTANEOUS at 17:39

## 2024-12-07 RX ADMIN — BUSPIRONE HYDROCHLORIDE 7.5 MG: 7.5 TABLET ORAL at 08:36

## 2024-12-07 RX ADMIN — Medication 250 MG: at 20:31

## 2024-12-07 RX ADMIN — SUCRALFATE 1 G: 1 TABLET ORAL at 20:31

## 2024-12-07 RX ADMIN — MORPHINE SULFATE 2 MG: 2 INJECTION, SOLUTION INTRAMUSCULAR; INTRAVENOUS at 16:59

## 2024-12-07 RX ADMIN — CARVEDILOL 12.5 MG: 12.5 TABLET, FILM COATED ORAL at 08:36

## 2024-12-07 RX ADMIN — FUROSEMIDE 40 MG: 10 INJECTION, SOLUTION INTRAMUSCULAR; INTRAVENOUS at 08:36

## 2024-12-07 RX ADMIN — INSULIN LISPRO 2 UNITS: 100 INJECTION, SOLUTION INTRAVENOUS; SUBCUTANEOUS at 12:31

## 2024-12-07 RX ADMIN — HYDROCORTISONE ACETATE 1 APPLICATION: 1 CREAM TOPICAL at 20:32

## 2024-12-07 RX ADMIN — LACTULOSE 30 G: 20 SOLUTION ORAL at 08:36

## 2024-12-07 RX ADMIN — BACITRACIN 0.9 G: 500 OINTMENT TOPICAL at 20:31

## 2024-12-07 RX ADMIN — LACTULOSE 30 G: 20 SOLUTION ORAL at 12:30

## 2024-12-07 RX ADMIN — BACITRACIN 0.9 G: 500 OINTMENT TOPICAL at 16:59

## 2024-12-07 RX ADMIN — SUCRALFATE 1 G: 1 TABLET ORAL at 17:39

## 2024-12-07 RX ADMIN — SUCRALFATE 1 G: 1 TABLET ORAL at 12:31

## 2024-12-07 RX ADMIN — ROPINIROLE HYDROCHLORIDE 1 MG: 1 TABLET, FILM COATED ORAL at 20:31

## 2024-12-07 NOTE — PLAN OF CARE
Goal Outcome Evaluation:  Plan of Care Reviewed With: patient        Progress: no change  Outcome Evaluation: pt is axox4, c/o of pain in his abd and back radiating down to his legs, medicated per mar. pt is on RA. skin and wound care done per orders. no other concerns noted.

## 2024-12-07 NOTE — PLAN OF CARE
Goal Outcome Evaluation:              Outcome Evaluation: pt alert and oriented x4 with some forgetfulness. vss on room air. pt frequently c/o pain; medicated PRN per MAR. pt stated he is concerned to be discharged due to the amount of pain he is in and increased abdominal distention.

## 2024-12-07 NOTE — PROGRESS NOTES
[facility]   Hospitalist Progress Note  Date: 2024  Patient Name: Nic Nicolas  : 1965  MRN: [id number]  Date of admission: 2024      Subjective   Subjective     Chief Complaint: Shortness of breath confusion hematemesis    Summary: Patient is a 59-year-old male past medical history significant for cirrhosis, TBI, diabetes, hypertension, hyperlipidemia, CHF, CKD, asthma, obesity, recurrent hepatic encephalopathy who presented to the emergency department after being seen by his gastroenterologist for confusion and unsteadiness shortness of breath and a week of hematemesis.  The patient was evaluated in the emergency department, found to be encephalopathic with elevated ammonia on lab work and clinically appeared volume overloaded.  Additionally has found to have significant anemia, therefore was admitted to the hospitalist service for further evaluation and management.  Gastroenterologist was consulted, and the patient received an EGD 2024 with findings of significant portal gastropathy which receives coagulation for hemostasis with argon plasma.  Early on the admission he also was transfused 1 unit of packed red blood cell and received ceftriaxone.  Gastroenterologist consultant has agreed with palliative care in consultation and consideration of hospice given patient with decompensated cirrhosis, not transplant candidate.  Ascites, hospital course complicated by after patient received therapeutic paracentesis for abdominal distention and ascites, he developed abdominal pain and back pain, general surgery consulted given free air noted in abdomen..  He had no evidence of intra-abdominal injury and general surgeon signed off.  He has been monitored, his hemoglobin trended, and has had issues with intermittent confusion during admission.    Interval Followup:   Today the patient states he feels unwell, complains of abdominal distention, abdominal and back pain, and states his urine  output is decreased.  Per nursing he is confused this morning.  Patient endorses having 5-6 bowel movements today.  Labs reviewed, notable for ammonia 71, hemoglobin stable..    Objective   Objective     Vitals:   Temp:  [97.3 °F (36.3 °C)-98.6 °F (37 °C)] 98.6 °F (37 °C)  Heart Rate:  [63-70] 63  Resp:  [18] 18  BP: ()/(42-68) 94/68  Physical Exam   Constitutional: Awake, alert   Respiratory diminished  Cardiovascular RRR  GI: Abdomen obese, soft mildly tender distended.  Scattered ecchymoses  Extremities: scattered bruising    Result Review    Result Review:  I have personally reviewed the pertinent results from the past 24 hours to 12/7/2024 15:47 EST and agree with these findings:  [x]  Laboratory   CBC          12/5/2024    05:27 12/6/2024    05:45 12/7/2024    06:30   CBC   WBC 2.54  2.65  2.94    RBC 2.97  2.97  2.98    Hemoglobin 7.9  8.0  8.0    Hematocrit 25.6  26.0  26.2    MCV 86.2  87.5  87.9    MCH 26.6  26.9  26.8    MCHC 30.9  30.8  30.5    RDW 18.1  18.6  18.6    Platelets 42  43  47      BMP          12/5/2024    05:27 12/6/2024    05:45 12/7/2024    06:30   BMP   BUN 25  24  23    Creatinine 1.10  1.04  1.01    Sodium 139  137  138    Potassium 3.5  3.2  3.2    Chloride 109  104  105    CO2 24.6  25.3  23.7    Calcium 7.7  7.8  7.7      LIVER FUNCTION TESTS:      Lab 12/07/24  0630 12/06/24  0545 12/05/24  0527 12/04/24  0618 12/03/24  0601 12/02/24  0526 12/01/24  0557   TOTAL PROTEIN  --   --   --   --  5.6* 5.0* 5.0*   ALBUMIN 2.8* 2.6* 2.5* 2.7* 2.9* 2.5* 2.6*   ALT (SGPT)  --   --   --   --  16 13 14   AST (SGOT)  --   --   --   --  39 30 30   BILIRUBIN  --   --   --   --  1.0 0.9 0.8   INDIRECT BILIRUBIN  --   --   --   --  0.6 0.4 0.4   BILIRUBIN DIRECT  --   --   --   --  0.4* 0.5* 0.4*   ALK PHOS  --   --   --   --  98 94 97       [x]  Microbiology   Microbiology Results (last 10 days)       ** No results found for the last 240 hours. **              [x]  Radiology CT Abdomen Pelvis  Without Contrast    Result Date: 12/5/2024  Increased anasarca and ascites are noted. Otherwise, little interval change is suggested by nonenhanced CT since 11/29/2024. Portions of this note were completed with a voice recognition program. Electronically Signed: Herberth Arevalo MD  12/5/2024 9:15 PM EST  Workstation ID: [id number]    XR Chest 1 View    Result Date: 12/4/2024  Impression: Findings concerning for pulmonary edema versus atypical/multifocal pneumonia. Electronically Signed: Wan Luque DO  12/4/2024 9:12 PM EST  Workstation ID: [id number]    CT Abdomen Pelvis Without Contrast    Result Date: 11/29/2024  No significant interval change is seen since the prior CT study from earlier during the same day. No pneumoperitoneum is seen.    Portions of this note were completed with a voice recognition program.  Electronically Signed By-Herberth Arevalo MD On:11/29/2024 9:40 PM         []  EKG/Telemetry   Telemetry Scan   Final Result          []  Cardiology/Vascular   []  Pathology  [x]  Old records  []  Other:    Assessment & Plan   Assessment / Plan     Assessment:    Hematemesis / Suspect acute upper GI bleed  Acute on chronic anemia  Acute decompensated cirrhosis  Status post paracentesis 11/27/24.  130 cc.    Status post GD 11/27/24.  Bleeding treated with argon plasma coagulopathy.  ROMO  Acute on chronic diastolic congestive heart failure  Anasarca  Thrombocytopenia secondary to cirrhosis  Hepatic encephalopathy  CKD stage III  History of TBI  Splenomegaly  Diabetes  History of DVT  Nursing home long-term care resident: Helen Burgos  Bleeding portal hypertensive gastropathy in the setting of thrombocytopenia/elevated INR  Pancytopenia  Code Status: DNR    Plan:    Status post transfusion of PRBCs earlier this admission.  Keep Hgb greater than 7.  Monitor CBC  Will give DC IV Lasix, initiate IV Bumex twice daily, and continue coreg  General surgery recommendations reviewed  Gastroenterology recommendations  reviewed.   Continue fluids/Na restriction.  Continuing lactulose and rifaximin for hepatic encephalopathy, titrate to 3-4 bowel movements daily  Continue basal, bolus, correction insulin  Wound care consultation  Long-term prognosis poor.  Palliative care consulted earlier this admission, CODE STATUS changed to DNR/DNI  Monitor clinically  Disposition: Increase diuresis for abdominal distention.  Check abdominal ultrasound.  Clinical improvement possible DC in the next 24 to 48 hours back to SNF, otherwise may require repeat paracentesis       VTE Prophylaxis:  Mechanical VTE prophylaxis orders are present.        CODE STATUS:   Medical Intervention Limits: No intubation (DNI)  Level Of Support Discussed With: Patient; Next of Kin (If No Surrogate)  Code Status (Patient has no pulse and is not breathing): No CPR (Do Not Attempt to Resuscitate)  Medical Interventions (Patient has pulse or is breathing): Limited Support    Electronically signed by Mathew Doyle MD, 12/07/24, 3:56 PM EST.

## 2024-12-08 LAB
ALBUMIN SERPL-MCNC: 2.6 G/DL (ref 3.5–5.2)
AMMONIA BLD-SCNC: 81 UMOL/L (ref 16–60)
ANION GAP SERPL CALCULATED.3IONS-SCNC: 8.2 MMOL/L (ref 5–15)
BILIRUB UR QL STRIP: NEGATIVE
BUN SERPL-MCNC: 23 MG/DL (ref 6–20)
BUN/CREAT SERPL: 20.9 (ref 7–25)
CALCIUM SPEC-SCNC: 8.1 MG/DL (ref 8.6–10.5)
CHLORIDE SERPL-SCNC: 108 MMOL/L (ref 98–107)
CLARITY UR: CLEAR
CO2 SERPL-SCNC: 21.8 MMOL/L (ref 22–29)
COLOR UR: YELLOW
CREAT SERPL-MCNC: 1.1 MG/DL (ref 0.76–1.27)
DEPRECATED RDW RBC AUTO: 58.7 FL (ref 37–54)
EGFRCR SERPLBLD CKD-EPI 2021: 77.8 ML/MIN/1.73
ERYTHROCYTE [DISTWIDTH] IN BLOOD BY AUTOMATED COUNT: 18.4 % (ref 12.3–15.4)
GLUCOSE BLDC GLUCOMTR-MCNC: 144 MG/DL (ref 70–99)
GLUCOSE BLDC GLUCOMTR-MCNC: 164 MG/DL (ref 70–99)
GLUCOSE BLDC GLUCOMTR-MCNC: 172 MG/DL (ref 70–99)
GLUCOSE BLDC GLUCOMTR-MCNC: 250 MG/DL (ref 70–99)
GLUCOSE SERPL-MCNC: 220 MG/DL (ref 65–99)
GLUCOSE UR STRIP-MCNC: NEGATIVE MG/DL
HCT VFR BLD AUTO: 25.6 % (ref 37.5–51)
HGB BLD-MCNC: 7.8 G/DL (ref 13–17.7)
HGB UR QL STRIP.AUTO: NEGATIVE
KETONES UR QL STRIP: NEGATIVE
LEUKOCYTE ESTERASE UR QL STRIP.AUTO: NEGATIVE
MCH RBC QN AUTO: 26.5 PG (ref 26.6–33)
MCHC RBC AUTO-ENTMCNC: 30.5 G/DL (ref 31.5–35.7)
MCV RBC AUTO: 87.1 FL (ref 79–97)
NITRITE UR QL STRIP: NEGATIVE
PH UR STRIP.AUTO: 6 [PH] (ref 5–8)
PHOSPHATE SERPL-MCNC: 2.2 MG/DL (ref 2.5–4.5)
PLATELET # BLD AUTO: 54 10*3/MM3 (ref 140–450)
PMV BLD AUTO: 11.4 FL (ref 6–12)
POTASSIUM SERPL-SCNC: 3.1 MMOL/L (ref 3.5–5.2)
PROT UR QL STRIP: NEGATIVE
RBC # BLD AUTO: 2.94 10*6/MM3 (ref 4.14–5.8)
SODIUM SERPL-SCNC: 138 MMOL/L (ref 136–145)
SP GR UR STRIP: 1.01 (ref 1–1.03)
UROBILINOGEN UR QL STRIP: NORMAL
WBC NRBC COR # BLD AUTO: 2.87 10*3/MM3 (ref 3.4–10.8)

## 2024-12-08 PROCEDURE — 80069 RENAL FUNCTION PANEL: CPT | Performed by: STUDENT IN AN ORGANIZED HEALTH CARE EDUCATION/TRAINING PROGRAM

## 2024-12-08 PROCEDURE — 82948 REAGENT STRIP/BLOOD GLUCOSE: CPT | Performed by: INTERNAL MEDICINE

## 2024-12-08 PROCEDURE — 25010000002 MORPHINE PER 10 MG: Performed by: INTERNAL MEDICINE

## 2024-12-08 PROCEDURE — 82140 ASSAY OF AMMONIA: CPT | Performed by: STUDENT IN AN ORGANIZED HEALTH CARE EDUCATION/TRAINING PROGRAM

## 2024-12-08 PROCEDURE — 63710000001 INSULIN GLARGINE PER 5 UNITS: Performed by: INTERNAL MEDICINE

## 2024-12-08 PROCEDURE — 81003 URINALYSIS AUTO W/O SCOPE: CPT | Performed by: INTERNAL MEDICINE

## 2024-12-08 PROCEDURE — 63710000001 INSULIN LISPRO (HUMAN) PER 5 UNITS: Performed by: INTERNAL MEDICINE

## 2024-12-08 PROCEDURE — 99233 SBSQ HOSP IP/OBS HIGH 50: CPT | Performed by: INTERNAL MEDICINE

## 2024-12-08 PROCEDURE — 82948 REAGENT STRIP/BLOOD GLUCOSE: CPT

## 2024-12-08 PROCEDURE — 25010000002 MORPHINE PER 10 MG: Performed by: STUDENT IN AN ORGANIZED HEALTH CARE EDUCATION/TRAINING PROGRAM

## 2024-12-08 PROCEDURE — 85027 COMPLETE CBC AUTOMATED: CPT | Performed by: STUDENT IN AN ORGANIZED HEALTH CARE EDUCATION/TRAINING PROGRAM

## 2024-12-08 PROCEDURE — 25010000002 BUMETANIDE PER 0.5 MG: Performed by: STUDENT IN AN ORGANIZED HEALTH CARE EDUCATION/TRAINING PROGRAM

## 2024-12-08 RX ORDER — MORPHINE SULFATE 2 MG/ML
2 INJECTION, SOLUTION INTRAMUSCULAR; INTRAVENOUS ONCE
Status: COMPLETED | OUTPATIENT
Start: 2024-12-08 | End: 2024-12-08

## 2024-12-08 RX ORDER — MORPHINE SULFATE 2 MG/ML
1 INJECTION, SOLUTION INTRAMUSCULAR; INTRAVENOUS ONCE
Status: COMPLETED | OUTPATIENT
Start: 2024-12-08 | End: 2024-12-08

## 2024-12-08 RX ADMIN — BACITRACIN 0.9 G: 500 OINTMENT TOPICAL at 20:34

## 2024-12-08 RX ADMIN — OXYCODONE 10 MG: 5 TABLET ORAL at 20:33

## 2024-12-08 RX ADMIN — PANTOPRAZOLE SODIUM 40 MG: 40 TABLET, DELAYED RELEASE ORAL at 08:42

## 2024-12-08 RX ADMIN — CARVEDILOL 12.5 MG: 12.5 TABLET, FILM COATED ORAL at 08:42

## 2024-12-08 RX ADMIN — HYDROXYZINE HYDROCHLORIDE 25 MG: 25 TABLET ORAL at 17:27

## 2024-12-08 RX ADMIN — LEVETIRACETAM 500 MG: 500 TABLET, FILM COATED ORAL at 08:42

## 2024-12-08 RX ADMIN — DICLOFENAC SODIUM 2 G: 10 GEL TOPICAL at 20:46

## 2024-12-08 RX ADMIN — INSULIN LISPRO 2 UNITS: 100 INJECTION, SOLUTION INTRAVENOUS; SUBCUTANEOUS at 12:51

## 2024-12-08 RX ADMIN — LACTULOSE 30 G: 20 SOLUTION ORAL at 12:51

## 2024-12-08 RX ADMIN — CARVEDILOL 12.5 MG: 12.5 TABLET, FILM COATED ORAL at 17:27

## 2024-12-08 RX ADMIN — OXYCODONE 10 MG: 5 TABLET ORAL at 00:23

## 2024-12-08 RX ADMIN — OXYCODONE 10 MG: 5 TABLET ORAL at 08:42

## 2024-12-08 RX ADMIN — MORPHINE SULFATE 1 MG: 2 INJECTION, SOLUTION INTRAMUSCULAR; INTRAVENOUS at 15:58

## 2024-12-08 RX ADMIN — Medication 250 MG: at 08:42

## 2024-12-08 RX ADMIN — MORPHINE SULFATE 2 MG: 2 INJECTION, SOLUTION INTRAMUSCULAR; INTRAVENOUS at 04:26

## 2024-12-08 RX ADMIN — ROPINIROLE HYDROCHLORIDE 1 MG: 1 TABLET, FILM COATED ORAL at 20:34

## 2024-12-08 RX ADMIN — BUMETANIDE 2 MG: 0.25 INJECTION INTRAMUSCULAR; INTRAVENOUS at 04:26

## 2024-12-08 RX ADMIN — HYDROCORTISONE ACETATE 1 APPLICATION: 1 CREAM TOPICAL at 20:46

## 2024-12-08 RX ADMIN — LACTULOSE 30 G: 20 SOLUTION ORAL at 17:27

## 2024-12-08 RX ADMIN — SUCRALFATE 1 G: 1 TABLET ORAL at 08:42

## 2024-12-08 RX ADMIN — INSULIN GLARGINE 10 UNITS: 100 INJECTION, SOLUTION SUBCUTANEOUS at 20:35

## 2024-12-08 RX ADMIN — SERTRALINE HYDROCHLORIDE 100 MG: 100 TABLET ORAL at 20:34

## 2024-12-08 RX ADMIN — BUSPIRONE HYDROCHLORIDE 7.5 MG: 7.5 TABLET ORAL at 20:34

## 2024-12-08 RX ADMIN — Medication 250 MG: at 20:34

## 2024-12-08 RX ADMIN — HYDROXYZINE HYDROCHLORIDE 25 MG: 25 TABLET ORAL at 00:23

## 2024-12-08 RX ADMIN — LACTULOSE 30 G: 20 SOLUTION ORAL at 08:42

## 2024-12-08 RX ADMIN — BUMETANIDE 2 MG: 0.25 INJECTION INTRAMUSCULAR; INTRAVENOUS at 15:57

## 2024-12-08 RX ADMIN — LACTULOSE 30 G: 20 SOLUTION ORAL at 20:34

## 2024-12-08 RX ADMIN — SUCRALFATE 1 G: 1 TABLET ORAL at 12:51

## 2024-12-08 RX ADMIN — BUSPIRONE HYDROCHLORIDE 7.5 MG: 7.5 TABLET ORAL at 08:51

## 2024-12-08 RX ADMIN — SUCRALFATE 1 G: 1 TABLET ORAL at 20:34

## 2024-12-08 RX ADMIN — SUCRALFATE 1 G: 1 TABLET ORAL at 17:27

## 2024-12-08 RX ADMIN — FLUTICASONE PROPIONATE 1 SPRAY: 50 SPRAY, METERED NASAL at 08:44

## 2024-12-08 RX ADMIN — LEVETIRACETAM 500 MG: 500 TABLET, FILM COATED ORAL at 20:34

## 2024-12-08 RX ADMIN — CETIRIZINE HYDROCHLORIDE 5 MG: 10 TABLET, FILM COATED ORAL at 20:34

## 2024-12-08 RX ADMIN — INSULIN LISPRO 6 UNITS: 100 INJECTION, SOLUTION INTRAVENOUS; SUBCUTANEOUS at 20:44

## 2024-12-08 RX ADMIN — Medication 10 ML: at 08:43

## 2024-12-08 RX ADMIN — BUSPIRONE HYDROCHLORIDE 7.5 MG: 7.5 TABLET ORAL at 15:57

## 2024-12-08 RX ADMIN — Medication 10 ML: at 20:48

## 2024-12-08 RX ADMIN — BACITRACIN 0.9 G: 500 OINTMENT TOPICAL at 15:57

## 2024-12-08 RX ADMIN — BACITRACIN 0.9 G: 500 OINTMENT TOPICAL at 08:51

## 2024-12-08 RX ADMIN — INSULIN LISPRO 2 UNITS: 100 INJECTION, SOLUTION INTRAVENOUS; SUBCUTANEOUS at 08:42

## 2024-12-08 RX ADMIN — PANTOPRAZOLE SODIUM 40 MG: 40 TABLET, DELAYED RELEASE ORAL at 17:27

## 2024-12-08 RX ADMIN — HYDROCORTISONE ACETATE 1 APPLICATION: 1 CREAM TOPICAL at 08:44

## 2024-12-08 NOTE — PLAN OF CARE
Goal Outcome Evaluation:  Plan of Care Reviewed With: patient        Progress: no change  Outcome Evaluation: pt axo4, up ad myrtle. C/o of pain in his lower back radiaiting down to his legs. medicated per mar, given a one time dose of morphine. skin and wound care done per orders. no other concerns noted.

## 2024-12-08 NOTE — PROGRESS NOTES
[facility]   Hospitalist Progress Note  Date: 2024  Patient Name: Nic Nicolas  : 1965  MRN: [id number]  Date of admission: 2024      Subjective   Subjective     Chief Complaint: Shortness of breath confusion hematemesis    Summary: Patient is a 59-year-old male past medical history significant for cirrhosis, TBI, diabetes, hypertension, hyperlipidemia, CHF, CKD, asthma, obesity, recurrent hepatic encephalopathy that presents to the emergency department after being seen by his gastroenterologist for confusion and unsteadiness shortness of breath and a week of hematemesis patient evaluated emergency department was encephalopathic with elevated ammonia appeared volume overloaded patient with worsening anemia has been admitted to the hospitalist service GI consulted patient transfused 1 unit of packed red blood cell will likely need endoscopic evaluation started on ceftriaxone for decompensated cirrhosis continuing rifaximin and lactulose continue with Carafate adding IV PPI    Interval Followup:   Seen this am, feeling better, seems to have chronic c/o abd pain    Objective   Objective     Vitals:   Temp:  [97.7 °F (36.5 °C)-98.6 °F (37 °C)] 98.6 °F (37 °C)  Heart Rate:  [63-71] 65  Resp:  [18] 18  BP: ()/(42-72) 149/72  Physical Exam   Constitutional: Awake, alert   Respiratory diminished  Cardiovascular RRR  GI: Abdomen obese, soft mildly tender distended.  Scattered ecchymoses  Extremities: scattered bruising    Result Review    Result Review:  I have personally reviewed the pertinent results from the past 24 hours to 2024 15:39 EST and agree with these findings:  [x]  Laboratory   CBC          2024    05:45 2024    06:30 2024    04:32   CBC   WBC 2.65  2.94  2.87    RBC 2.97  2.98  2.94    Hemoglobin 8.0  8.0  7.8    Hematocrit 26.0  26.2  25.6    MCV 87.5  87.9  87.1    MCH 26.9  26.8  26.5    MCHC 30.8  30.5  30.5    RDW 18.6  18.6  18.4    Platelets 43  47   54      BMP          12/6/2024    05:45 12/7/2024    06:30 12/8/2024    04:32   BMP   BUN 24  23  23    Creatinine 1.04  1.01  1.10    Sodium 137  138  138    Potassium 3.2  3.2  3.1    Chloride 104  105  108    CO2 25.3  23.7  21.8    Calcium 7.8  7.7  8.1      LIVER FUNCTION TESTS:      Lab 12/08/24  0432 12/07/24  0630 12/06/24  0545 12/05/24  0527 12/04/24  0618 12/03/24  0601 12/02/24  0526   TOTAL PROTEIN  --   --   --   --   --  5.6* 5.0*   ALBUMIN 2.6* 2.8* 2.6* 2.5* 2.7* 2.9* 2.5*   ALT (SGPT)  --   --   --   --   --  16 13   AST (SGOT)  --   --   --   --   --  39 30   BILIRUBIN  --   --   --   --   --  1.0 0.9   INDIRECT BILIRUBIN  --   --   --   --   --  0.6 0.4   BILIRUBIN DIRECT  --   --   --   --   --  0.4* 0.5*   ALK PHOS  --   --   --   --   --  98 94       [x]  Microbiology   Microbiology Results (last 10 days)       ** No results found for the last 240 hours. **              [x]  Radiology US Abdomen Limited    Result Date: 12/7/2024  Small amount of ascites on the left side of the abdomen. Electronically Signed: Kye Breaux MD  12/7/2024 9:44 PM EST  Workstation ID: [id number]    CT Abdomen Pelvis Without Contrast    Result Date: 12/5/2024  Increased anasarca and ascites are noted. Otherwise, little interval change is suggested by nonenhanced CT since 11/29/2024. Portions of this note were completed with a voice recognition program. Electronically Signed: Herberth Arevalo MD  12/5/2024 9:15 PM EST  Workstation ID: [id number]    XR Chest 1 View    Result Date: 12/4/2024  Impression: Findings concerning for pulmonary edema versus atypical/multifocal pneumonia. Electronically Signed: Wan Luque DO  12/4/2024 9:12 PM EST  Workstation ID: [id number]       []  EKG/Telemetry   Telemetry Scan   Final Result          []  Cardiology/Vascular   []  Pathology  [x]  Old records  []  Other:    Assessment & Plan   Assessment / Plan     Assessment:    Hematemesis / Suspect acute upper GI bleed  Acute on  chronic anemia  Acute decompensated cirrhosis  Status post paracentesis 11/27/24.  130 cc.    Status post GD 11/27/24.  Bleeding treated with argon plasma coagulopathy.  ROMO  Acute on chronic diastolic congestive heart failure  Anasarca  Thrombocytopenia secondary to cirrhosis  Hepatic encephalopathy  CKD stage III  History of TBI  Splenomegaly  Diabetes  History of DVT  Nursing home long-term care resident: Helen Burgos  Bleeding portal hypertensive gastropathy in the setting of thrombocytopenia/elevated INR  Pancytopenia  Code Status: DNR    Plan:    Status post PRBCs.  Keep Hgb greater than 7.  IV diuresis  Continuing lactulose and rifaximin for hepatic encephalopathy  Continue basal, bolus, correction insulin  Wound care consultation  Long-term prognosis poor.  Palliative care consult.  CT abd pelvis for abd pain --> no acute findings  Pain management  Potentially back to NH tomorrow      VTE Prophylaxis:  Mechanical VTE prophylaxis orders are present.        CODE STATUS:   Medical Intervention Limits: No intubation (DNI)  Level Of Support Discussed With: Patient; Next of Kin (If No Surrogate)  Code Status (Patient has no pulse and is not breathing): No CPR (Do Not Attempt to Resuscitate)  Medical Interventions (Patient has pulse or is breathing): Limited Support    Electronically signed by Dominick Davila MD, 12/08/24 15:39 EST

## 2024-12-08 NOTE — PLAN OF CARE
Goal Outcome Evaluation:           Progress: no change  Outcome Evaluation: Pt AOx4 throuhgout the shift, on room air, and up ad-myrtle with the walker. Pt complaint of pain and discomfort a few times this shift, medicated per MAR. Pt had complaint of nausea once this shift, medicated with PRN meds, per MAR. Skin care completed per orders. Pt got US completed this shift, awaiting to see if repeat paracentesis is needed. VSS, No acute changes noted this shift. Pt appears to be in no apparent distress at this time, denies any current needs, and has call light within reach.

## 2024-12-09 VITALS
OXYGEN SATURATION: 96 % | HEIGHT: 68 IN | DIASTOLIC BLOOD PRESSURE: 67 MMHG | BODY MASS INDEX: 41.3 KG/M2 | SYSTOLIC BLOOD PRESSURE: 127 MMHG | WEIGHT: 272.49 LBS | HEART RATE: 63 BPM | RESPIRATION RATE: 18 BRPM | TEMPERATURE: 97.9 F

## 2024-12-09 LAB
ALBUMIN SERPL-MCNC: 2.8 G/DL (ref 3.5–5.2)
ALBUMIN/GLOB SERPL: 1 G/DL
ALP SERPL-CCNC: 118 U/L (ref 39–117)
ALT SERPL W P-5'-P-CCNC: 19 U/L (ref 1–41)
AMMONIA BLD-SCNC: 61 UMOL/L (ref 16–60)
ANION GAP SERPL CALCULATED.3IONS-SCNC: 9 MMOL/L (ref 5–15)
AST SERPL-CCNC: 47 U/L (ref 1–40)
BASOPHILS # BLD AUTO: 0.03 10*3/MM3 (ref 0–0.2)
BASOPHILS NFR BLD AUTO: 0.9 % (ref 0–1.5)
BILIRUB SERPL-MCNC: 0.8 MG/DL (ref 0–1.2)
BUN SERPL-MCNC: 27 MG/DL (ref 6–20)
BUN/CREAT SERPL: 21.8 (ref 7–25)
CALCIUM SPEC-SCNC: 8.4 MG/DL (ref 8.6–10.5)
CHLORIDE SERPL-SCNC: 105 MMOL/L (ref 98–107)
CO2 SERPL-SCNC: 24 MMOL/L (ref 22–29)
CREAT SERPL-MCNC: 1.24 MG/DL (ref 0.76–1.27)
DEPRECATED RDW RBC AUTO: 60.5 FL (ref 37–54)
EGFRCR SERPLBLD CKD-EPI 2021: 67.4 ML/MIN/1.73
EOSINOPHIL # BLD AUTO: 0.14 10*3/MM3 (ref 0–0.4)
EOSINOPHIL NFR BLD AUTO: 4.3 % (ref 0.3–6.2)
ERYTHROCYTE [DISTWIDTH] IN BLOOD BY AUTOMATED COUNT: 18.8 % (ref 12.3–15.4)
FERRITIN SERPL-MCNC: 52.69 NG/ML (ref 30–400)
GLOBULIN UR ELPH-MCNC: 2.8 GM/DL
GLUCOSE BLDC GLUCOMTR-MCNC: 118 MG/DL (ref 70–99)
GLUCOSE BLDC GLUCOMTR-MCNC: 155 MG/DL (ref 70–99)
GLUCOSE SERPL-MCNC: 155 MG/DL (ref 65–99)
HCT VFR BLD AUTO: 26.3 % (ref 37.5–51)
HGB BLD-MCNC: 8.1 G/DL (ref 13–17.7)
IMM GRANULOCYTES # BLD AUTO: 0.01 10*3/MM3 (ref 0–0.05)
IMM GRANULOCYTES NFR BLD AUTO: 0.3 % (ref 0–0.5)
IRON 24H UR-MRATE: 36 MCG/DL (ref 59–158)
IRON SATN MFR SERPL: 11 % (ref 20–50)
LYMPHOCYTES # BLD AUTO: 0.72 10*3/MM3 (ref 0.7–3.1)
LYMPHOCYTES NFR BLD AUTO: 22.4 % (ref 19.6–45.3)
MAGNESIUM SERPL-MCNC: 1.5 MG/DL (ref 1.6–2.6)
MCH RBC QN AUTO: 27.2 PG (ref 26.6–33)
MCHC RBC AUTO-ENTMCNC: 30.8 G/DL (ref 31.5–35.7)
MCV RBC AUTO: 88.3 FL (ref 79–97)
MONOCYTES # BLD AUTO: 0.29 10*3/MM3 (ref 0.1–0.9)
MONOCYTES NFR BLD AUTO: 9 % (ref 5–12)
NEUTROPHILS NFR BLD AUTO: 2.03 10*3/MM3 (ref 1.7–7)
NEUTROPHILS NFR BLD AUTO: 63.1 % (ref 42.7–76)
NRBC BLD AUTO-RTO: 0 /100 WBC (ref 0–0.2)
PHOSPHATE SERPL-MCNC: 3.1 MG/DL (ref 2.5–4.5)
PLATELET # BLD AUTO: 52 10*3/MM3 (ref 140–450)
PMV BLD AUTO: 9.7 FL (ref 6–12)
POTASSIUM SERPL-SCNC: 3.3 MMOL/L (ref 3.5–5.2)
PROT SERPL-MCNC: 5.6 G/DL (ref 6–8.5)
RBC # BLD AUTO: 2.98 10*6/MM3 (ref 4.14–5.8)
SODIUM SERPL-SCNC: 138 MMOL/L (ref 136–145)
TIBC SERPL-MCNC: 329 MCG/DL (ref 298–536)
TRANSFERRIN SERPL-MCNC: 221 MG/DL (ref 200–360)
WBC NRBC COR # BLD AUTO: 3.22 10*3/MM3 (ref 3.4–10.8)

## 2024-12-09 PROCEDURE — 83735 ASSAY OF MAGNESIUM: CPT | Performed by: INTERNAL MEDICINE

## 2024-12-09 PROCEDURE — 99239 HOSP IP/OBS DSCHRG MGMT >30: CPT | Performed by: FAMILY MEDICINE

## 2024-12-09 PROCEDURE — 25010000002 BUMETANIDE PER 0.5 MG: Performed by: STUDENT IN AN ORGANIZED HEALTH CARE EDUCATION/TRAINING PROGRAM

## 2024-12-09 PROCEDURE — 25810000003 SODIUM CHLORIDE 0.9 % SOLUTION: Performed by: FAMILY MEDICINE

## 2024-12-09 PROCEDURE — 83540 ASSAY OF IRON: CPT | Performed by: NURSE PRACTITIONER

## 2024-12-09 PROCEDURE — 63710000001 INSULIN LISPRO (HUMAN) PER 5 UNITS: Performed by: INTERNAL MEDICINE

## 2024-12-09 PROCEDURE — 84100 ASSAY OF PHOSPHORUS: CPT | Performed by: INTERNAL MEDICINE

## 2024-12-09 PROCEDURE — 82948 REAGENT STRIP/BLOOD GLUCOSE: CPT

## 2024-12-09 PROCEDURE — 84466 ASSAY OF TRANSFERRIN: CPT | Performed by: NURSE PRACTITIONER

## 2024-12-09 PROCEDURE — 85025 COMPLETE CBC W/AUTO DIFF WBC: CPT | Performed by: INTERNAL MEDICINE

## 2024-12-09 PROCEDURE — 82728 ASSAY OF FERRITIN: CPT | Performed by: NURSE PRACTITIONER

## 2024-12-09 PROCEDURE — 82948 REAGENT STRIP/BLOOD GLUCOSE: CPT | Performed by: INTERNAL MEDICINE

## 2024-12-09 PROCEDURE — 80053 COMPREHEN METABOLIC PANEL: CPT | Performed by: INTERNAL MEDICINE

## 2024-12-09 PROCEDURE — 82140 ASSAY OF AMMONIA: CPT | Performed by: STUDENT IN AN ORGANIZED HEALTH CARE EDUCATION/TRAINING PROGRAM

## 2024-12-09 PROCEDURE — 25010000002 NA FERRIC GLUC CPLX PER 12.5 MG: Performed by: FAMILY MEDICINE

## 2024-12-09 RX ORDER — LACTULOSE 10 G/15ML
30 SOLUTION ORAL 4 TIMES DAILY
Qty: 5400 ML | Refills: 0 | Status: SHIPPED | OUTPATIENT
Start: 2024-12-09 | End: 2025-01-08

## 2024-12-09 RX ORDER — PANTOPRAZOLE SODIUM 40 MG/1
40 TABLET, DELAYED RELEASE ORAL 2 TIMES DAILY
Qty: 60 TABLET | Refills: 0 | Status: SHIPPED | OUTPATIENT
Start: 2024-12-09 | End: 2025-01-08

## 2024-12-09 RX ORDER — SPIRONOLACTONE 100 MG/1
200 TABLET, FILM COATED ORAL DAILY
Qty: 60 TABLET | Refills: 0 | Status: SHIPPED | OUTPATIENT
Start: 2024-12-09 | End: 2025-01-08

## 2024-12-09 RX ORDER — CARVEDILOL 12.5 MG/1
12.5 TABLET ORAL 2 TIMES DAILY WITH MEALS
Qty: 60 TABLET | Refills: 0 | Status: SHIPPED | OUTPATIENT
Start: 2024-12-09 | End: 2025-01-08

## 2024-12-09 RX ORDER — LEVETIRACETAM 500 MG/1
500 TABLET ORAL 2 TIMES DAILY
Qty: 60 TABLET | Refills: 0 | Status: SHIPPED | OUTPATIENT
Start: 2024-12-09 | End: 2025-01-08

## 2024-12-09 RX ADMIN — LEVETIRACETAM 500 MG: 500 TABLET, FILM COATED ORAL at 08:25

## 2024-12-09 RX ADMIN — LACTULOSE 30 G: 20 SOLUTION ORAL at 12:49

## 2024-12-09 RX ADMIN — BUSPIRONE HYDROCHLORIDE 7.5 MG: 7.5 TABLET ORAL at 08:25

## 2024-12-09 RX ADMIN — PANTOPRAZOLE SODIUM 40 MG: 40 TABLET, DELAYED RELEASE ORAL at 08:25

## 2024-12-09 RX ADMIN — DICLOFENAC SODIUM 2 G: 10 GEL TOPICAL at 08:31

## 2024-12-09 RX ADMIN — SODIUM CHLORIDE 250 MG: 9 INJECTION, SOLUTION INTRAVENOUS at 10:37

## 2024-12-09 RX ADMIN — FLUTICASONE PROPIONATE 1 SPRAY: 50 SPRAY, METERED NASAL at 08:31

## 2024-12-09 RX ADMIN — SUCRALFATE 1 G: 1 TABLET ORAL at 12:49

## 2024-12-09 RX ADMIN — HYDROCORTISONE ACETATE 1 APPLICATION: 1 CREAM TOPICAL at 08:31

## 2024-12-09 RX ADMIN — Medication 250 MG: at 08:25

## 2024-12-09 RX ADMIN — INSULIN LISPRO 2 UNITS: 100 INJECTION, SOLUTION INTRAVENOUS; SUBCUTANEOUS at 12:45

## 2024-12-09 RX ADMIN — OXYCODONE 10 MG: 5 TABLET ORAL at 12:49

## 2024-12-09 RX ADMIN — LACTULOSE 30 G: 20 SOLUTION ORAL at 08:25

## 2024-12-09 RX ADMIN — OXYCODONE 10 MG: 5 TABLET ORAL at 04:39

## 2024-12-09 RX ADMIN — SUCRALFATE 1 G: 1 TABLET ORAL at 08:25

## 2024-12-09 RX ADMIN — CARVEDILOL 12.5 MG: 12.5 TABLET, FILM COATED ORAL at 08:25

## 2024-12-09 RX ADMIN — Medication 10 ML: at 08:25

## 2024-12-09 RX ADMIN — Medication 800 MG: at 12:45

## 2024-12-09 RX ADMIN — BUMETANIDE 2 MG: 0.25 INJECTION INTRAMUSCULAR; INTRAVENOUS at 04:40

## 2024-12-09 RX ADMIN — SPIRONOLACTONE 100 MG: 25 TABLET ORAL at 08:25

## 2024-12-09 RX ADMIN — BACITRACIN 0.9 G: 500 OINTMENT TOPICAL at 08:30

## 2024-12-09 NOTE — PLAN OF CARE
Goal Outcome Evaluation:  Plan of Care Reviewed With: patient        Progress: no change  Outcome Evaluation: Patient alert and oriented to person, place, and time but not entirely to situation. Re-oriented. Educated on fall risk, complied with socks, walker, and self-assessment for pain. Patient complained of pain in his legs radiating up to his back. Medicated per MAR. Scratched self in comforting bilat forearms. Currently laying in bed, eyes closed, breathing deeply on room air, call bell in reach, no further needs voiced.

## 2024-12-09 NOTE — PLAN OF CARE
Goal Outcome Evaluation:              Outcome Evaluation: patient dicahrging via private vehicle to [place]. Di\AVS and discharge summary sent with patient and report called to Edie CALDERA

## 2024-12-09 NOTE — DISCHARGE SUMMARY
[facility]         HOSPITALIST  DISCHARGE SUMMARY    Patient Name: Nic Nicolas  : 1965  MRN: [id number]    Date of Admission: 2024  Date of Discharge: 2024  Primary Care Physician: Sheldon Carcamo MD    Consults       Date and Time Order Name Status Description    2024 12:39 PM Inpatient General Surgery Consult Completed     2024 12:12 PM Inpatient Gastroenterology Consult Completed     2024  8:57 PM Inpatient Hospitalist Consult      10/29/2024  3:50 PM Inpatient Gastroenterology Consult Completed             Active and Resolved Hospital Problems:  Bleeding portal hypertensive gastropathy in the setting of thrombocytopenia/elevated INR  Hematemesis   Acute upper GI bleed  Portal hypertensive gastropathy  Acute on chronic anemia  Acute decompensated cirrhosis  Status post paracentesis 24.  130 cc.    Status post GD 24.  Bleeding treated with argon plasma coagulopathy.  ROMO  Acute on chronic diastolic congestive heart failure  Anasarca  Thrombocytopenia secondary to cirrhosis  Hepatic encephalopathy  CKD stage III  History of TBI  Splenomegaly  Diabetes  History of DVT  Nursing home long-term care resident: Helen Burgos  Pancytopenia  Code Status: DNR  Active Hospital Problems    Diagnosis POA    **Hepatic encephalopathy [K76.82] Yes    Chronic kidney disease [N18.9] Yes    Chronic anemia [D64.9] Yes    Anasarca [R60.1] Yes    Stroke [I63.9] Yes    Anxiety [F41.9] Yes    Gastrointestinal hemorrhage associated with peptic ulcer [K27.4] Unknown    Sleep apnea [G47.30] Yes    CHF (congestive heart failure) [I50.9] Yes    Type 2 diabetes mellitus with hyperglycemia [E11.65] Yes    Chronic low back pain [M54.50, G89.29] Yes      Resolved Hospital Problems   No resolved problems to display.       Hospital Course     Hospital Course:  Nic Nicolas is a 59 y.o. male past medical history significant for cirrhosis, TBI, diabetes,  hypertension, hyperlipidemia, CHF, CKD, asthma, obesity, recurrent hepatic encephalopathy that presents to the emergency department after being seen by his gastroenterologist for confusion and unsteadiness shortness of breath and a week of hematemesis patient evaluated emergency department was encephalopathic with elevated ammonia appeared volume overloaded patient with worsening anemia has been admitted to the hospitalist service GI consulted patient transfused 1 unit of packed red blood cell started on ceftriaxone for decompensated cirrhosis continuing rifaximin and lactulose continue with Carafate adding IV PPI.  Taken for EGD which revealed bleeding due to portal hypertensive gastropathy.  Elevated INR and thrombocytopenia thought likely contributing to bleeding.  Treated with argon laser.  Continued on Protonix twice daily and Carafate.  Oral potassium replacement held.  Increased home spironolactone.  Continue diuresis with Bumex.  Continued on Rocephin to complete empiric course due to upper GI bleed.  Continue lactulose and rifaximin.  Mental status improved.  Hemoglobin remained stable.  Palliative care consulted.  Appropriate for hospice per gastroenterology when patient and family agreeable.  Patient seen and evaluated on day of discharge and thought stable for discharge back to [place] to follow-up with gastroenterology and primary care provider as an outpatient.        DISCHARGE Follow Up Recommendations for labs and diagnostics: As above      Day of Discharge     Vital Signs:  Temp:  [97.7 °F (36.5 °C)-98.6 °F (37 °C)] 98.1 °F (36.7 °C)  Heart Rate:  [65-77] 65  Resp:  [18] 18  BP: (115-149)/(42-72) 115/60  Physical Exam:   Gen. well-developed appearing stated age in no acute distress  HEENT: Normocephalic atraumatic moist membranes pupils equal round reactive light, no scleral icterus no conjunctival injection  Cardiovascular: regular rate and rhythm no murmurs rubs or gallops S1-S2, no lower  extremity edema appreciated  Pulmonary: Clear to auscultation bilaterally no wheezes rales or rhonchi symmetric chest expansion, unlabored, no conversational dyspnea appreciated  Gastrointestinal: Soft nontender nondistended positive bowel sounds all 4 quadrants no rebound or guarding  Musculoskeletal: No clubbing cyanosis, warm and well-perfused, calves soft symmetric nontender bilaterally  Skin: Clean dry, wound to the right great toe  Neuro: Cranial nerves II through XII intact grossly no sensorimotor deficits appreciated bilateral upper and lower extremities  Psych: Patient is calm cooperative and appropriate with exam not responding to internal stimuli  : No Zamora catheter no bladder distention no suprapubic tenderness      Discharge Details        Discharge Medications        Changes to Medications        Instructions Start Date   carvedilol 12.5 MG tablet  Commonly known as: COREG  What changed:   medication strength  how much to take   12.5 mg, Oral, 2 Times Daily With Meals      pantoprazole 40 MG EC tablet  Commonly known as: PROTONIX  What changed: when to take this   40 mg, Oral, 2 Times Daily      spironolactone 100 MG tablet  Commonly known as: ALDACTONE  What changed: how much to take   200 mg, Oral, Daily             Continue These Medications        Instructions Start Date   busPIRone 7.5 MG tablet  Commonly known as: BUSPAR   7.5 mg, Oral, 3 Times Daily      cetirizine 5 MG tablet  Commonly known as: zyrTEC   Take 1 tablet by mouth Daily.      Diclofenac Sodium 1 % gel gel  Commonly known as: VOLTAREN   4 g, 4 Times Daily      Flovent  MCG/ACT inhaler  Generic drug: fluticasone   1 puff, Inhalation, 2 Times Daily - RT      fluticasone 50 MCG/ACT nasal spray  Commonly known as: FLONASE   1 spray, Daily      furosemide 20 MG tablet  Commonly known as: LASIX   20 mg, Daily      furosemide 80 MG tablet  Commonly known as: LASIX   80 mg, Every Morning      HumaLOG KwikPen 100 UNIT/ML solution  pen-injector  Generic drug: Insulin Lispro (1 Unit Dial)   15 Units, 3 Times Daily Before Meals      hydrocortisone 1 % cream   1 Application, 2 Times Daily PRN      hydrOXYzine 25 MG tablet  Commonly known as: ATARAX   Take 1 tablet by mouth Every 8 (Eight) Hours As Needed for Itching.      lactulose 10 GM/15ML solution  Commonly known as: CHRONULAC   30 g, Oral, 4 Times Daily      Levemir FlexPen 100 UNIT/ML injection  Generic drug: insulin detemir   10 Units, Nightly      levETIRAcetam 500 MG tablet  Commonly known as: Keppra   500 mg, Oral, 2 Times Daily      LidozenPatch 4-1 % patch  Generic drug: Lidocaine-Menthol   1 patch, Daily      magnesium oxide 400 MG tablet  Commonly known as: MAG-OX   400 mg, Oral, Daily      ondansetron 4 MG tablet  Commonly known as: Zofran   4 mg, Oral, Every 8 Hours PRN      oxyCODONE 10 MG tablet  Commonly known as: ROXICODONE   Take 1 tablet by mouth Every 8 (Eight) Hours As Needed for Moderate Pain or Severe Pain.      rOPINIRole 1 MG tablet  Commonly known as: REQUIP   1 mg, Oral, Nightly, take 1 hour before bedtime      saccharomyces boulardii 250 MG capsule  Commonly known as: FLORASTOR   250 mg, 2 Times Daily      sertraline 100 MG tablet  Commonly known as: ZOLOFT   100 mg, Oral, Nightly      simethicone 80 MG chewable tablet  Commonly known as: MYLICON   80 mg, 3 Times Daily Before Meals      sucralfate 1 g tablet  Commonly known as: CARAFATE   1 g, 4 Times Daily      tetrahydrozoline 0.05 % ophthalmic solution   1 drop, 2 Times Daily      triamcinolone 0.1 % cream  Commonly known as: KENALOG   Apply 1 Application topically to the appropriate area as directed 2 (Two) Times a Day.      Vitamin D3 50 MCG (2000 UT) capsule   2,000 Units, Daily      Xifaxan 550 MG tablet  Generic drug: riFAXIMin   Take 1 tablet by mouth Every 12 (Twelve) Hours.      Zinc 50 MG tablet   1 tablet, Daily             Stop These Medications      famotidine 20 MG tablet  Commonly known as: PEPCID      lisinopril 5 MG tablet  Commonly known as: PRINIVIL,ZESTRIL     potassium chloride 10 MEQ CR capsule  Commonly known as: MICRO-K     traZODone 50 MG tablet  Commonly known as: DESYREL              No Known Allergies    Discharge Disposition:  Skilled Nursing Facility (DC - External)    Diet:  Hospital:  Diet Order   Procedures    Diet: Regular/House; Fluid Consistency: Thin (IDDSI 0)       Discharge Activity:   Activity Instructions       Gradually Increase Activity Until at Pre-Hospitalization Level              CODE STATUS:  Code Status and Medical Interventions: No CPR (Do Not Attempt to Resuscitate); Limited Support; No intubation (DNI)   Ordered at: 11/26/24 1549     Medical Intervention Limits:    No intubation (DNI)     Level Of Support Discussed With:    Patient    Next of Kin (If No Surrogate)     Code Status (Patient has no pulse and is not breathing):    No CPR (Do Not Attempt to Resuscitate)     Medical Interventions (Patient has pulse or is breathing):    Limited Support         Future Appointments   Date Time Provider Department Center   12/10/2024  9:30 AM NURSE/MA ONC [facility] ONC [facility]   12/13/2024 11:30 AM Candie Badillo APRN [facility] ONC [facility]       Additional Instructions for the Follow-ups that You Need to Schedule       Discharge Follow-up with PCP   As directed       Currently Documented PCP:    Sheldon Carcamo MD    PCP Phone Number:    [phone]     Follow Up Details: Hospital discharge follow up                Pertinent  and/or Most Recent Results     PROCEDURES:   EGD as above    LAB RESULTS:      Lab 12/09/24  0531 12/08/24  0432 12/07/24  0630 12/06/24  0545 12/05/24  0527 12/04/24  0618   WBC 3.22* 2.87* 2.94* 2.65* 2.54* 2.34*   HEMOGLOBIN 8.1* 7.8* 8.0* 8.0* 7.9* 7.1*   HEMATOCRIT 26.3* 25.6* 26.2* 26.0* 25.6* 23.1*   PLATELETS 52* 54* 47* 43* 42* 41*   NEUTROS ABS 2.03  --  1.85 1.68* 1.62* 1.40*   IMMATURE GRANS (ABS) 0.01  --  0.02 0.01 0.01 0.01   LYMPHS ABS  0.72  --  0.62* 0.58* 0.51* 0.59*   MONOS ABS 0.29  --  0.30 0.26 0.25 0.23   EOS ABS 0.14  --  0.12 0.10 0.12 0.09   MCV 88.3 87.1 87.9 87.5 86.2 87.5         Lab 12/09/24  0531 12/08/24  0432 12/07/24  0630 12/06/24  0545 12/05/24  0527 12/04/24  0618 12/03/24  0601   SODIUM 138 138 138 137 139 137 138   POTASSIUM 3.3* 3.1* 3.2* 3.2* 3.5 3.6 4.1   CHLORIDE 105 108* 105 104 109* 105 106   CO2 24.0 21.8* 23.7 25.3 24.6 27.7 24.5   ANION GAP 9.0 8.2 9.3 7.7 5.4 4.3* 7.5   BUN 27* 23* 23* 24* 25* 26* 25*   CREATININE 1.24 1.10 1.01 1.04 1.10 1.13 1.11   EGFR 67.4 77.8 86.2 83.2 77.8 75.3 77.0   GLUCOSE 155* 220* 226* 198* 247* 214* 198*   CALCIUM 8.4* 8.1* 7.7* 7.8* 7.7* 8.0* 7.9*   MAGNESIUM 1.5*  --   --  1.5*  --  1.6 1.6   PHOSPHORUS 3.1 2.2* 2.3* 2.3* 2.5 2.4* 2.5         Lab 12/09/24  0531 12/08/24 0432 12/07/24 0630 12/06/24  0545 12/05/24  0527 12/04/24  0618 12/03/24  0601   TOTAL PROTEIN 5.6*  --   --   --   --   --  5.6*   ALBUMIN 2.8* 2.6* 2.8* 2.6* 2.5*   < > 2.9*   GLOBULIN 2.8  --   --   --   --   --   --    ALT (SGPT) 19  --   --   --   --   --  16   AST (SGOT) 47*  --   --   --   --   --  39   BILIRUBIN 0.8  --   --   --   --   --  1.0   INDIRECT BILIRUBIN  --   --   --   --   --   --  0.6   BILIRUBIN DIRECT  --   --   --   --   --   --  0.4*   ALK PHOS 118*  --   --   --   --   --  98    < > = values in this interval not displayed.         Lab 12/05/24  0527   PROBNP 363.3             Lab 12/09/24  0531   IRON 36*   IRON SATURATION (TSAT) 11*   TIBC 329   TRANSFERRIN 221   FERRITIN 52.69         Brief Urine Lab Results  (Last result in the past 365 days)        Color   Clarity   Blood   Leuk Est   Nitrite   Protein   CREAT   Urine HCG        12/08/24 1011 Yellow   Clear   Negative   Negative   Negative   Negative                 Microbiology Results (last 10 days)       ** No results found for the last 240 hours. **            US Abdomen Limited    Result Date: 12/7/2024  Impression: Small amount  of ascites on the left side of the abdomen. Electronically Signed: Kye Breaux MD  12/7/2024 9:44 PM EST  Workstation ID: [id number]    CT Abdomen Pelvis Without Contrast    Result Date: 12/5/2024  Impression: Increased anasarca and ascites are noted. Otherwise, little interval change is suggested by nonenhanced CT since 11/29/2024. Portions of this note were completed with a voice recognition program. Electronically Signed: Herberth Arevalo MD  12/5/2024 9:15 PM EST  Workstation ID: [id number]    XR Chest 1 View    Result Date: 12/4/2024  Impression: Impression: Findings concerning for pulmonary edema versus atypical/multifocal pneumonia. Electronically Signed: Wan Luque DO  12/4/2024 9:12 PM EST  Workstation ID: [id number]    CT Abdomen Pelvis Without Contrast    Result Date: 11/29/2024  Impression: No significant interval change is seen since the prior CT study from earlier during the same day. No pneumoperitoneum is seen.    Portions of this note were completed with a voice recognition program.  Electronically Signed By-Herberth Arevalo MD On:11/29/2024 9:40 PM      CT Abdomen Pelvis Without Contrast    Addendum Date: 11/29/2024    ADDENDUM #1 By report, the site of paracentesis was in the anterior right mid abdomen. Tiny gas collections seen adjacent to the colon on series 3 images 103 through 115 may be extraluminal. I discussed findings with Mr. Goetz at 1215. I would recommend follow-up CT scan of the abdomen and pelvis be obtained after administration of oral contrast. I would recommend a delay of 4 hours following ministration of IV contrast to optimize evaluation of the colon. IV contrast would also be helpful. Electronically Signed: Yury Starks MD  11/29/2024 12:39 PM EST  Workstation ID: [id number] ORIGINAL REPORT: CT ABDOMEN PELVIS WO CONTRAST Date of Exam: 11/29/2024 11:18 AM EST Indication: Lower Abd pain. Comparison: CT abdomen and pelvis 11/25/2024 Technique: Axial CT images were obtained of  the abdomen and pelvis without the administration of contrast. Reconstructed coronal and sagittal images were also obtained. Automated exposure control and iterative construction methods were used. Findings: The lung bases are clear. The liver has a nodular contour consistent with cirrhosis. The right liver lobe is relatively small. The gallbladder is not abnormally distended. No pancreatic or adrenal mass is evident. The spleen is enlarged, measuring 19 cm x 11 cm in greatest AP and  transverse dimension, which is not significantly changed. A small amount of peritoneal fluid is less in comparison to 11/25/2024. The patient is undergone interim paracentesis. Ill-defined increased density in subcutaneous soft tissues is consistent with anasarca. No renal or ureteral stones are seen. There is no evidence of hydronephrosis. The urinary bladder is not abnormally distended. The prostate gland measures 5 cm in greatest transverse dimension. The stomach is not abnormally distended. Bowel loops are of normal caliber. Evaluation of solid organs and bowel is limited without contrast. Midline hernia defect in the epigastric region measures 2 cm in diameter. Abdominal fat herniating through the defect measures 5.5 cm. Small umbilical hernia containing fat and fluid is stable. Bilateral inguinal hernias are stable. Left inguinal adenopathy is evident, with lymph nodes measuring up to 2 cm in greatest short axis dimension. Degenerative changes are seen in the lower thoracic and lumbar spine. Hardware in the left hemipelvis is consistent with ORIF. Impression: 1.Cirrhotic liver. 2.Splenomegaly, not significantly changed. 3.Small amount of peritoneal fluid, less than on 11/25/2024. The patient is undergone interim paracentesis. Anasarca. 4.Mildly enlarged prostate gland. 5.Left inguinal adenopathy. Electronically Signed: Yury Starks MD  11/29/2024 11:39 AM EST  Workstation ID: [id number]    Result Date: 11/29/2024  Impression:  Impression: 1.Cirrhotic liver. 2.Splenomegaly, not significantly changed. 3.Small amount of peritoneal fluid, less than on 11/25/2024. The patient is undergone interim paracentesis. Anasarca. 4.Mildly enlarged prostate gland. 5.Left inguinal adenopathy. Electronically Signed: Yury Starks MD  11/29/2024 11:39 AM EST  Workstation ID: [id number]    CT Head Without Contrast    Result Date: 11/29/2024  Impression: Impression: No acute intracranial process identified. Electronically Signed: Emanuel Dewey MD  11/29/2024 11:36 AM EST  Workstation ID: [id number]    US Paracentesis    Result Date: 11/27/2024  Impression: Impression: Successful ultrasound-guided diagnostic paracentesis without immediate complication.  Electronically Signed: Jimmy Jordan MD  11/27/2024 10:10 AM EST  Workstation ID: [id number]    XR Chest 1 View    Result Date: 11/26/2024  Impression: Mild infiltrate or atelectasis at the right base. Electronically Signed: Kye Breaux MD  11/26/2024 5:47 AM EST  Workstation ID: [id number]    CT Abdomen Pelvis With Contrast    Result Date: 11/25/2024  Impression: Impression: 1.Small to moderate volume ascites most pronounced in the right upper and lower quadrants. 2.Cirrhosis with splenomegaly. 3.Cholelithiasis without gallbladder inflammatory changes. 4.Fat and fluid-containing umbilical hernia, similar to prior examination. 5.Increasing simple appearing fluid within the left inguinal canal now measuring 5.4 cm in diameter, likely ascites related. 6.Mild generalized body wall edema. Electronically Signed: Emanuel Dewey MD  11/25/2024 8:48 PM EST  Workstation ID: [id number]     Results for orders placed during the hospital encounter of 02/24/22    Duplex Carotid Ultrasound CAR    Interpretation Summary  · No significant stenosis is present in carotid bifurcations bilaterally.  · There are right mid internal carotid artery velocity elevations that would meet criteria for mild to moderate stenosis, however, this  appears to be related to tortuosity in this region.  · No significant plaque in the bifurcations bilaterally.  · Vertebral flow is antegrade bilaterally.      Results for orders placed during the hospital encounter of 02/24/22    Duplex Carotid Ultrasound CAR    Interpretation Summary  · No significant stenosis is present in carotid bifurcations bilaterally.  · There are right mid internal carotid artery velocity elevations that would meet criteria for mild to moderate stenosis, however, this appears to be related to tortuosity in this region.  · No significant plaque in the bifurcations bilaterally.  · Vertebral flow is antegrade bilaterally.      Results for orders placed during the hospital encounter of 05/13/24    Adult Transthoracic Echo Complete W/ Cont if Necessary Per Protocol    Interpretation Summary    Left ventricular ejection fraction appears to be 66 - 70%.    Left ventricular wall thickness is consistent with mild concentric hypertrophy.    Left ventricular diastolic function is consistent with (grade I) impaired relaxation.    The left atrial cavity is mildly dilated.    Estimated right ventricular systolic pressure from tricuspid regurgitation is normal (<35 mmHg).      Labs Pending at Discharge:        Time spent on Discharge including face to face service: Greater than 35 minutes    Electronically signed by Dioni Jimenez MD, 12/09/24, 10:24 AM EST.

## 2025-01-06 ENCOUNTER — APPOINTMENT (OUTPATIENT)
Dept: GENERAL RADIOLOGY | Facility: HOSPITAL | Age: 60
End: 2025-01-06
Payer: MEDICAID

## 2025-01-06 ENCOUNTER — HOSPITAL ENCOUNTER (INPATIENT)
Facility: HOSPITAL | Age: 60
LOS: 8 days | Discharge: NURSING, REHAB OR LONG-TERM CARE | End: 2025-01-14
Attending: EMERGENCY MEDICINE | Admitting: STUDENT IN AN ORGANIZED HEALTH CARE EDUCATION/TRAINING PROGRAM
Payer: MEDICAID

## 2025-01-06 DIAGNOSIS — D69.6 THROMBOCYTOPENIA: ICD-10-CM

## 2025-01-06 DIAGNOSIS — M51.369 DEGENERATION OF INTERVERTEBRAL DISC OF LUMBAR REGION, UNSPECIFIED WHETHER PAIN PRESENT: ICD-10-CM

## 2025-01-06 DIAGNOSIS — K74.60 CIRRHOSIS OF LIVER WITH ASCITES, UNSPECIFIED HEPATIC CIRRHOSIS TYPE: ICD-10-CM

## 2025-01-06 DIAGNOSIS — K76.82 HEPATIC ENCEPHALOPATHY: ICD-10-CM

## 2025-01-06 DIAGNOSIS — R26.2 DIFFICULTY IN WALKING: ICD-10-CM

## 2025-01-06 DIAGNOSIS — R18.8 CIRRHOSIS OF LIVER WITH ASCITES, UNSPECIFIED HEPATIC CIRRHOSIS TYPE: ICD-10-CM

## 2025-01-06 DIAGNOSIS — D64.9 CHRONIC ANEMIA: Primary | ICD-10-CM

## 2025-01-06 PROBLEM — G93.41 ACUTE METABOLIC ENCEPHALOPATHY: Status: ACTIVE | Noted: 2025-01-06

## 2025-01-06 LAB
ALBUMIN SERPL-MCNC: 2.9 G/DL (ref 3.5–5.2)
ALBUMIN/GLOB SERPL: 0.9 G/DL
ALP SERPL-CCNC: 117 U/L (ref 39–117)
ALT SERPL W P-5'-P-CCNC: 24 U/L (ref 1–41)
AMMONIA BLD-SCNC: 149 UMOL/L (ref 16–60)
ANION GAP SERPL CALCULATED.3IONS-SCNC: 9.8 MMOL/L (ref 5–15)
AST SERPL-CCNC: 46 U/L (ref 1–40)
BACTERIA UR QL AUTO: ABNORMAL /HPF
BASOPHILS # BLD AUTO: 0.04 10*3/MM3 (ref 0–0.2)
BASOPHILS NFR BLD AUTO: 1.2 % (ref 0–1.5)
BILIRUB SERPL-MCNC: 1.2 MG/DL (ref 0–1.2)
BILIRUB UR QL STRIP: NEGATIVE
BUN SERPL-MCNC: 14 MG/DL (ref 6–20)
BUN/CREAT SERPL: 12.3 (ref 7–25)
CALCIUM SPEC-SCNC: 9.1 MG/DL (ref 8.6–10.5)
CHLORIDE SERPL-SCNC: 108 MMOL/L (ref 98–107)
CLARITY UR: CLEAR
CO2 SERPL-SCNC: 23.2 MMOL/L (ref 22–29)
COLOR UR: YELLOW
CREAT SERPL-MCNC: 1.14 MG/DL (ref 0.76–1.27)
D-LACTATE SERPL-SCNC: 1.7 MMOL/L (ref 0.5–2)
D-LACTATE SERPL-SCNC: 2.2 MMOL/L (ref 0.5–2)
DEPRECATED RDW RBC AUTO: 68.2 FL (ref 37–54)
EGFRCR SERPLBLD CKD-EPI 2021: 74.5 ML/MIN/1.73
EOSINOPHIL # BLD AUTO: 0.09 10*3/MM3 (ref 0–0.4)
EOSINOPHIL NFR BLD AUTO: 2.7 % (ref 0.3–6.2)
ERYTHROCYTE [DISTWIDTH] IN BLOOD BY AUTOMATED COUNT: 19 % (ref 12.3–15.4)
GEN 5 1HR TROPONIN T REFLEX: 36 NG/L
GLOBULIN UR ELPH-MCNC: 3.2 GM/DL
GLUCOSE BLDC GLUCOMTR-MCNC: 165 MG/DL (ref 70–99)
GLUCOSE SERPL-MCNC: 172 MG/DL (ref 65–99)
GLUCOSE UR STRIP-MCNC: NEGATIVE MG/DL
HCT VFR BLD AUTO: 32 % (ref 37.5–51)
HGB BLD-MCNC: 9.1 G/DL (ref 13–17.7)
HGB UR QL STRIP.AUTO: ABNORMAL
HOLD SPECIMEN: NORMAL
HYALINE CASTS UR QL AUTO: ABNORMAL /LPF
IMM GRANULOCYTES # BLD AUTO: 0.04 10*3/MM3 (ref 0–0.05)
IMM GRANULOCYTES NFR BLD AUTO: 1.2 % (ref 0–0.5)
INR PPP: 2.1 (ref 0.86–1.15)
KETONES UR QL STRIP: NEGATIVE
LEUKOCYTE ESTERASE UR QL STRIP.AUTO: NEGATIVE
LYMPHOCYTES # BLD AUTO: 0.62 10*3/MM3 (ref 0.7–3.1)
LYMPHOCYTES NFR BLD AUTO: 18.3 % (ref 19.6–45.3)
MAGNESIUM SERPL-MCNC: 1.4 MG/DL (ref 1.6–2.6)
MCH RBC QN AUTO: 27.5 PG (ref 26.6–33)
MCHC RBC AUTO-ENTMCNC: 28.4 G/DL (ref 31.5–35.7)
MCV RBC AUTO: 96.7 FL (ref 79–97)
MONOCYTES # BLD AUTO: 0.26 10*3/MM3 (ref 0.1–0.9)
MONOCYTES NFR BLD AUTO: 7.7 % (ref 5–12)
NEUTROPHILS NFR BLD AUTO: 2.34 10*3/MM3 (ref 1.7–7)
NEUTROPHILS NFR BLD AUTO: 68.9 % (ref 42.7–76)
NITRITE UR QL STRIP: NEGATIVE
NRBC BLD AUTO-RTO: 0 /100 WBC (ref 0–0.2)
PH UR STRIP.AUTO: 6.5 [PH] (ref 5–8)
PLATELET # BLD AUTO: 76 10*3/MM3 (ref 140–450)
PMV BLD AUTO: 13.1 FL (ref 6–12)
POTASSIUM SERPL-SCNC: 3.5 MMOL/L (ref 3.5–5.2)
PROT SERPL-MCNC: 6.1 G/DL (ref 6–8.5)
PROT UR QL STRIP: NEGATIVE
PROTHROMBIN TIME: 23.9 SECONDS (ref 11.8–14.9)
QT INTERVAL: 414 MS
QTC INTERVAL: 485 MS
RBC # BLD AUTO: 3.31 10*6/MM3 (ref 4.14–5.8)
RBC # UR STRIP: ABNORMAL /HPF
REF LAB TEST METHOD: ABNORMAL
SODIUM SERPL-SCNC: 141 MMOL/L (ref 136–145)
SP GR UR STRIP: 1.01 (ref 1–1.03)
SQUAMOUS #/AREA URNS HPF: ABNORMAL /HPF
TROPONIN T % DELTA: -10 %
TROPONIN T NUMERIC DELTA: -4 NG/L
TROPONIN T SERPL HS-MCNC: 40 NG/L
UROBILINOGEN UR QL STRIP: ABNORMAL
WBC # UR STRIP: ABNORMAL /HPF
WBC NRBC COR # BLD AUTO: 3.39 10*3/MM3 (ref 3.4–10.8)
WHOLE BLOOD HOLD COAG: NORMAL
WHOLE BLOOD HOLD SPECIMEN: NORMAL
WHOLE BLOOD HOLD SPECIMEN: NORMAL

## 2025-01-06 PROCEDURE — 99285 EMERGENCY DEPT VISIT HI MDM: CPT

## 2025-01-06 PROCEDURE — 93005 ELECTROCARDIOGRAM TRACING: CPT | Performed by: EMERGENCY MEDICINE

## 2025-01-06 PROCEDURE — P9612 CATHETERIZE FOR URINE SPEC: HCPCS

## 2025-01-06 PROCEDURE — 25010000002 MAGNESIUM SULFATE 2 GM/50ML SOLUTION: Performed by: STUDENT IN AN ORGANIZED HEALTH CARE EDUCATION/TRAINING PROGRAM

## 2025-01-06 PROCEDURE — 25010000002 ONDANSETRON PER 1 MG: Performed by: STUDENT IN AN ORGANIZED HEALTH CARE EDUCATION/TRAINING PROGRAM

## 2025-01-06 PROCEDURE — 85610 PROTHROMBIN TIME: CPT | Performed by: STUDENT IN AN ORGANIZED HEALTH CARE EDUCATION/TRAINING PROGRAM

## 2025-01-06 PROCEDURE — 71045 X-RAY EXAM CHEST 1 VIEW: CPT

## 2025-01-06 PROCEDURE — 99222 1ST HOSP IP/OBS MODERATE 55: CPT | Performed by: STUDENT IN AN ORGANIZED HEALTH CARE EDUCATION/TRAINING PROGRAM

## 2025-01-06 PROCEDURE — 85025 COMPLETE CBC W/AUTO DIFF WBC: CPT | Performed by: EMERGENCY MEDICINE

## 2025-01-06 PROCEDURE — 83605 ASSAY OF LACTIC ACID: CPT | Performed by: EMERGENCY MEDICINE

## 2025-01-06 PROCEDURE — 80053 COMPREHEN METABOLIC PANEL: CPT | Performed by: EMERGENCY MEDICINE

## 2025-01-06 PROCEDURE — 84484 ASSAY OF TROPONIN QUANT: CPT | Performed by: EMERGENCY MEDICINE

## 2025-01-06 PROCEDURE — 81001 URINALYSIS AUTO W/SCOPE: CPT | Performed by: EMERGENCY MEDICINE

## 2025-01-06 PROCEDURE — 83735 ASSAY OF MAGNESIUM: CPT | Performed by: EMERGENCY MEDICINE

## 2025-01-06 PROCEDURE — 82948 REAGENT STRIP/BLOOD GLUCOSE: CPT

## 2025-01-06 PROCEDURE — 36415 COLL VENOUS BLD VENIPUNCTURE: CPT | Performed by: EMERGENCY MEDICINE

## 2025-01-06 PROCEDURE — 82140 ASSAY OF AMMONIA: CPT | Performed by: EMERGENCY MEDICINE

## 2025-01-06 PROCEDURE — 94761 N-INVAS EAR/PLS OXIMETRY MLT: CPT

## 2025-01-06 RX ORDER — FUROSEMIDE 20 MG/1
20 TABLET ORAL DAILY
Status: DISCONTINUED | OUTPATIENT
Start: 2025-01-06 | End: 2025-01-06

## 2025-01-06 RX ORDER — PETROLATUM,WHITE
1 OINTMENT IN PACKET (GRAM) TOPICAL 2 TIMES DAILY
COMMUNITY

## 2025-01-06 RX ORDER — NYSTATIN 100000 [USP'U]/G
POWDER TOPICAL EVERY 12 HOURS SCHEDULED
Status: DISCONTINUED | OUTPATIENT
Start: 2025-01-06 | End: 2025-01-14 | Stop reason: HOSPADM

## 2025-01-06 RX ORDER — CARVEDILOL 25 MG/1
25 TABLET ORAL EVERY MORNING
Status: DISCONTINUED | OUTPATIENT
Start: 2025-01-07 | End: 2025-01-14 | Stop reason: HOSPADM

## 2025-01-06 RX ORDER — ONDANSETRON 2 MG/ML
4 INJECTION INTRAMUSCULAR; INTRAVENOUS EVERY 6 HOURS PRN
Status: DISCONTINUED | OUTPATIENT
Start: 2025-01-06 | End: 2025-01-14 | Stop reason: HOSPADM

## 2025-01-06 RX ORDER — LEVETIRACETAM 500 MG/1
500 TABLET ORAL 2 TIMES DAILY
Status: DISCONTINUED | OUTPATIENT
Start: 2025-01-06 | End: 2025-01-14 | Stop reason: HOSPADM

## 2025-01-06 RX ORDER — SODIUM CHLORIDE 0.9 % (FLUSH) 0.9 %
10 SYRINGE (ML) INJECTION EVERY 12 HOURS SCHEDULED
Status: DISCONTINUED | OUTPATIENT
Start: 2025-01-06 | End: 2025-01-14 | Stop reason: HOSPADM

## 2025-01-06 RX ORDER — ALUMINA, MAGNESIA, AND SIMETHICONE 2400; 2400; 240 MG/30ML; MG/30ML; MG/30ML
15 SUSPENSION ORAL EVERY 6 HOURS PRN
Status: DISCONTINUED | OUTPATIENT
Start: 2025-01-06 | End: 2025-01-14 | Stop reason: HOSPADM

## 2025-01-06 RX ORDER — FUROSEMIDE 20 MG/1
20 TABLET ORAL DAILY
Status: DISCONTINUED | OUTPATIENT
Start: 2025-01-06 | End: 2025-01-08

## 2025-01-06 RX ORDER — POTASSIUM CHLORIDE 750 MG/1
10 CAPSULE, EXTENDED RELEASE ORAL DAILY
COMMUNITY

## 2025-01-06 RX ORDER — AMOXICILLIN 250 MG
2 CAPSULE ORAL 2 TIMES DAILY PRN
Status: DISCONTINUED | OUTPATIENT
Start: 2025-01-06 | End: 2025-01-14 | Stop reason: HOSPADM

## 2025-01-06 RX ORDER — SPIRONOLACTONE 100 MG/1
200 TABLET, FILM COATED ORAL DAILY
Status: DISCONTINUED | OUTPATIENT
Start: 2025-01-06 | End: 2025-01-14 | Stop reason: HOSPADM

## 2025-01-06 RX ORDER — POLYETHYLENE GLYCOL 3350 17 G/17G
17 POWDER, FOR SOLUTION ORAL DAILY PRN
Status: DISCONTINUED | OUTPATIENT
Start: 2025-01-06 | End: 2025-01-14 | Stop reason: HOSPADM

## 2025-01-06 RX ORDER — CARVEDILOL 12.5 MG/1
12.5 TABLET ORAL NIGHTLY
Status: DISCONTINUED | OUTPATIENT
Start: 2025-01-06 | End: 2025-01-14 | Stop reason: HOSPADM

## 2025-01-06 RX ORDER — LACTULOSE 10 G/15ML
30 SOLUTION ORAL ONCE
Status: COMPLETED | OUTPATIENT
Start: 2025-01-06 | End: 2025-01-06

## 2025-01-06 RX ORDER — SODIUM CHLORIDE 9 MG/ML
40 INJECTION, SOLUTION INTRAVENOUS AS NEEDED
Status: DISCONTINUED | OUTPATIENT
Start: 2025-01-06 | End: 2025-01-14 | Stop reason: HOSPADM

## 2025-01-06 RX ORDER — CARVEDILOL 25 MG/1
25 TABLET ORAL EVERY MORNING
COMMUNITY

## 2025-01-06 RX ORDER — ZINC ACETATE 50 MG/1
50 CAPSULE ORAL DAILY
COMMUNITY

## 2025-01-06 RX ORDER — BISACODYL 5 MG/1
5 TABLET, DELAYED RELEASE ORAL DAILY PRN
Status: DISCONTINUED | OUTPATIENT
Start: 2025-01-06 | End: 2025-01-14 | Stop reason: HOSPADM

## 2025-01-06 RX ORDER — BISACODYL 10 MG
10 SUPPOSITORY, RECTAL RECTAL DAILY PRN
Status: DISCONTINUED | OUTPATIENT
Start: 2025-01-06 | End: 2025-01-14 | Stop reason: HOSPADM

## 2025-01-06 RX ORDER — SODIUM CHLORIDE 0.9 % (FLUSH) 0.9 %
10 SYRINGE (ML) INJECTION AS NEEDED
Status: DISCONTINUED | OUTPATIENT
Start: 2025-01-06 | End: 2025-01-14 | Stop reason: HOSPADM

## 2025-01-06 RX ORDER — CARVEDILOL 12.5 MG/1
12.5 TABLET ORAL NIGHTLY
COMMUNITY

## 2025-01-06 RX ORDER — FUROSEMIDE 40 MG/1
80 TABLET ORAL EVERY MORNING
Status: DISCONTINUED | OUTPATIENT
Start: 2025-01-07 | End: 2025-01-14 | Stop reason: HOSPADM

## 2025-01-06 RX ORDER — NITROGLYCERIN 0.4 MG/1
0.4 TABLET SUBLINGUAL
Status: DISCONTINUED | OUTPATIENT
Start: 2025-01-06 | End: 2025-01-14 | Stop reason: HOSPADM

## 2025-01-06 RX ORDER — FUROSEMIDE 40 MG/1
80 TABLET ORAL EVERY MORNING
Status: DISCONTINUED | OUTPATIENT
Start: 2025-01-07 | End: 2025-01-06

## 2025-01-06 RX ORDER — PANTOPRAZOLE SODIUM 40 MG/10ML
40 INJECTION, POWDER, LYOPHILIZED, FOR SOLUTION INTRAVENOUS
Status: DISCONTINUED | OUTPATIENT
Start: 2025-01-06 | End: 2025-01-06

## 2025-01-06 RX ORDER — ONDANSETRON 4 MG/1
4 TABLET, ORALLY DISINTEGRATING ORAL EVERY 8 HOURS PRN
COMMUNITY

## 2025-01-06 RX ORDER — SERTRALINE HYDROCHLORIDE 100 MG/1
100 TABLET, FILM COATED ORAL NIGHTLY
Status: DISCONTINUED | OUTPATIENT
Start: 2025-01-06 | End: 2025-01-14 | Stop reason: HOSPADM

## 2025-01-06 RX ORDER — MAGNESIUM SULFATE HEPTAHYDRATE 40 MG/ML
2 INJECTION, SOLUTION INTRAVENOUS ONCE
Status: COMPLETED | OUTPATIENT
Start: 2025-01-06 | End: 2025-01-06

## 2025-01-06 RX ORDER — PANTOPRAZOLE SODIUM 40 MG/1
40 TABLET, DELAYED RELEASE ORAL 2 TIMES DAILY
Status: COMPLETED | OUTPATIENT
Start: 2025-01-06 | End: 2025-01-08

## 2025-01-06 RX ORDER — LACTULOSE 10 G/15ML
30 SOLUTION ORAL 3 TIMES DAILY
Status: DISCONTINUED | OUTPATIENT
Start: 2025-01-06 | End: 2025-01-11

## 2025-01-06 RX ORDER — BUSPIRONE HYDROCHLORIDE 15 MG/1
7.5 TABLET ORAL 3 TIMES DAILY
Status: DISCONTINUED | OUTPATIENT
Start: 2025-01-06 | End: 2025-01-14 | Stop reason: HOSPADM

## 2025-01-06 RX ORDER — HYDROXYZINE HYDROCHLORIDE 25 MG/1
25 TABLET, FILM COATED ORAL EVERY 8 HOURS PRN
Status: DISCONTINUED | OUTPATIENT
Start: 2025-01-06 | End: 2025-01-14 | Stop reason: HOSPADM

## 2025-01-06 RX ORDER — OXYCODONE HYDROCHLORIDE 5 MG/1
10 TABLET ORAL EVERY 8 HOURS PRN
Status: DISCONTINUED | OUTPATIENT
Start: 2025-01-06 | End: 2025-01-07

## 2025-01-06 RX ADMIN — OXYCODONE 10 MG: 5 TABLET ORAL at 13:37

## 2025-01-06 RX ADMIN — Medication 10 ML: at 21:58

## 2025-01-06 RX ADMIN — SERTRALINE HYDROCHLORIDE 100 MG: 100 TABLET ORAL at 21:53

## 2025-01-06 RX ADMIN — BUSPIRONE HYDROCHLORIDE 7.5 MG: 15 TABLET ORAL at 21:53

## 2025-01-06 RX ADMIN — ONDANSETRON 4 MG: 2 INJECTION INTRAMUSCULAR; INTRAVENOUS at 11:04

## 2025-01-06 RX ADMIN — FUROSEMIDE 20 MG: 20 TABLET ORAL at 17:27

## 2025-01-06 RX ADMIN — LEVETIRACETAM 500 MG: 500 TABLET, FILM COATED ORAL at 21:53

## 2025-01-06 RX ADMIN — LACTULOSE 30 G: 20 SOLUTION ORAL at 15:49

## 2025-01-06 RX ADMIN — SPIRONOLACTONE 200 MG: 100 TABLET ORAL at 13:37

## 2025-01-06 RX ADMIN — PANTOPRAZOLE SODIUM 40 MG: 40 TABLET, DELAYED RELEASE ORAL at 21:53

## 2025-01-06 RX ADMIN — CARVEDILOL 12.5 MG: 12.5 TABLET, FILM COATED ORAL at 21:55

## 2025-01-06 RX ADMIN — OXYCODONE 10 MG: 5 TABLET ORAL at 21:03

## 2025-01-06 RX ADMIN — NYSTATIN: 100000 POWDER TOPICAL at 11:04

## 2025-01-06 RX ADMIN — Medication 10 ML: at 10:25

## 2025-01-06 RX ADMIN — RIFAXIMIN 550 MG: 550 TABLET ORAL at 10:25

## 2025-01-06 RX ADMIN — RIFAXIMIN 550 MG: 550 TABLET ORAL at 21:53

## 2025-01-06 RX ADMIN — PANTOPRAZOLE SODIUM 40 MG: 40 INJECTION, POWDER, FOR SOLUTION INTRAVENOUS at 10:25

## 2025-01-06 RX ADMIN — LACTULOSE 30 G: 20 SOLUTION ORAL at 04:18

## 2025-01-06 RX ADMIN — MAGNESIUM SULFATE HEPTAHYDRATE 2 G: 40 INJECTION, SOLUTION INTRAVENOUS at 10:25

## 2025-01-06 RX ADMIN — LACTULOSE 30 G: 20 SOLUTION ORAL at 21:52

## 2025-01-06 RX ADMIN — NYSTATIN: 100000 POWDER TOPICAL at 23:04

## 2025-01-06 RX ADMIN — LACTULOSE 30 G: 20 SOLUTION ORAL at 10:25

## 2025-01-06 RX ADMIN — BUSPIRONE HYDROCHLORIDE 7.5 MG: 15 TABLET ORAL at 15:49
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
## 2025-01-06 NOTE — PLAN OF CARE
Goal Outcome Evaluation:  Plan of Care Reviewed With: patient        Progress: improving  Outcome Evaluation: Patient arrived to floor from ED. VSS. Alert and oriented to self and place. Nausea treated x1 and pain treated x1, see MAR. No other acute changes or concerns from patient at this time, plan of care ongoing.
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
## 2025-01-07 LAB
ANION GAP SERPL CALCULATED.3IONS-SCNC: 10.7 MMOL/L (ref 5–15)
BASOPHILS # BLD AUTO: 0.02 10*3/MM3 (ref 0–0.2)
BASOPHILS NFR BLD AUTO: 0.5 % (ref 0–1.5)
BUN SERPL-MCNC: 12 MG/DL (ref 6–20)
BUN/CREAT SERPL: 11.3 (ref 7–25)
CALCIUM SPEC-SCNC: 8.7 MG/DL (ref 8.6–10.5)
CHLORIDE SERPL-SCNC: 105 MMOL/L (ref 98–107)
CO2 SERPL-SCNC: 23.3 MMOL/L (ref 22–29)
CREAT SERPL-MCNC: 1.06 MG/DL (ref 0.76–1.27)
DEPRECATED RDW RBC AUTO: 60.6 FL (ref 37–54)
EGFRCR SERPLBLD CKD-EPI 2021: 81.3 ML/MIN/1.73
EOSINOPHIL # BLD AUTO: 0.1 10*3/MM3 (ref 0–0.4)
EOSINOPHIL NFR BLD AUTO: 2.5 % (ref 0.3–6.2)
ERYTHROCYTE [DISTWIDTH] IN BLOOD BY AUTOMATED COUNT: 19 % (ref 12.3–15.4)
GLUCOSE SERPL-MCNC: 197 MG/DL (ref 65–99)
HCT VFR BLD AUTO: 25.9 % (ref 37.5–51)
HGB BLD-MCNC: 8.1 G/DL (ref 13–17.7)
IMM GRANULOCYTES # BLD AUTO: 0.02 10*3/MM3 (ref 0–0.05)
IMM GRANULOCYTES NFR BLD AUTO: 0.5 % (ref 0–0.5)
LYMPHOCYTES # BLD AUTO: 0.8 10*3/MM3 (ref 0.7–3.1)
LYMPHOCYTES NFR BLD AUTO: 20.3 % (ref 19.6–45.3)
MAGNESIUM SERPL-MCNC: 1.5 MG/DL (ref 1.6–2.6)
MCH RBC QN AUTO: 27.2 PG (ref 26.6–33)
MCHC RBC AUTO-ENTMCNC: 31.3 G/DL (ref 31.5–35.7)
MCV RBC AUTO: 86.9 FL (ref 79–97)
MONOCYTES # BLD AUTO: 0.41 10*3/MM3 (ref 0.1–0.9)
MONOCYTES NFR BLD AUTO: 10.4 % (ref 5–12)
NEUTROPHILS NFR BLD AUTO: 2.6 10*3/MM3 (ref 1.7–7)
NEUTROPHILS NFR BLD AUTO: 65.8 % (ref 42.7–76)
NRBC BLD AUTO-RTO: 0 /100 WBC (ref 0–0.2)
PLATELET # BLD AUTO: 67 10*3/MM3 (ref 140–450)
PMV BLD AUTO: 10.4 FL (ref 6–12)
POTASSIUM SERPL-SCNC: 3 MMOL/L (ref 3.5–5.2)
RBC # BLD AUTO: 2.98 10*6/MM3 (ref 4.14–5.8)
SODIUM SERPL-SCNC: 139 MMOL/L (ref 136–145)
WBC NRBC COR # BLD AUTO: 3.95 10*3/MM3 (ref 3.4–10.8)

## 2025-01-07 PROCEDURE — 80048 BASIC METABOLIC PNL TOTAL CA: CPT | Performed by: STUDENT IN AN ORGANIZED HEALTH CARE EDUCATION/TRAINING PROGRAM

## 2025-01-07 PROCEDURE — 25010000002 MAGNESIUM SULFATE 2 GM/50ML SOLUTION: Performed by: INTERNAL MEDICINE

## 2025-01-07 PROCEDURE — 99232 SBSQ HOSP IP/OBS MODERATE 35: CPT | Performed by: INTERNAL MEDICINE

## 2025-01-07 PROCEDURE — 85025 COMPLETE CBC W/AUTO DIFF WBC: CPT | Performed by: STUDENT IN AN ORGANIZED HEALTH CARE EDUCATION/TRAINING PROGRAM

## 2025-01-07 PROCEDURE — 83735 ASSAY OF MAGNESIUM: CPT | Performed by: STUDENT IN AN ORGANIZED HEALTH CARE EDUCATION/TRAINING PROGRAM

## 2025-01-07 RX ORDER — POTASSIUM CHLORIDE 750 MG/1
40 CAPSULE, EXTENDED RELEASE ORAL EVERY 4 HOURS
Status: COMPLETED | OUTPATIENT
Start: 2025-01-07 | End: 2025-01-07

## 2025-01-07 RX ORDER — MAGNESIUM SULFATE HEPTAHYDRATE 40 MG/ML
2 INJECTION, SOLUTION INTRAVENOUS ONCE
Status: COMPLETED | OUTPATIENT
Start: 2025-01-07 | End: 2025-01-07

## 2025-01-07 RX ORDER — OXYCODONE HYDROCHLORIDE 5 MG/1
10 TABLET ORAL EVERY 6 HOURS PRN
Status: DISCONTINUED | OUTPATIENT
Start: 2025-01-07 | End: 2025-01-14 | Stop reason: HOSPADM

## 2025-01-07 RX ADMIN — LEVETIRACETAM 500 MG: 500 TABLET, FILM COATED ORAL at 09:17

## 2025-01-07 RX ADMIN — BUSPIRONE HYDROCHLORIDE 7.5 MG: 15 TABLET ORAL at 16:33

## 2025-01-07 RX ADMIN — RIFAXIMIN 550 MG: 550 TABLET ORAL at 09:19

## 2025-01-07 RX ADMIN — Medication 10 ML: at 09:20

## 2025-01-07 RX ADMIN — RIFAXIMIN 550 MG: 550 TABLET ORAL at 20:41

## 2025-01-07 RX ADMIN — SERTRALINE HYDROCHLORIDE 100 MG: 100 TABLET ORAL at 20:40

## 2025-01-07 RX ADMIN — OXYCODONE 10 MG: 5 TABLET ORAL at 12:32

## 2025-01-07 RX ADMIN — BUSPIRONE HYDROCHLORIDE 7.5 MG: 15 TABLET ORAL at 20:40

## 2025-01-07 RX ADMIN — NYSTATIN: 100000 POWDER TOPICAL at 20:41

## 2025-01-07 RX ADMIN — PANTOPRAZOLE SODIUM 40 MG: 40 TABLET, DELAYED RELEASE ORAL at 09:19

## 2025-01-07 RX ADMIN — LACTULOSE 30 G: 20 SOLUTION ORAL at 09:17

## 2025-01-07 RX ADMIN — OXYCODONE 10 MG: 5 TABLET ORAL at 04:31

## 2025-01-07 RX ADMIN — PANTOPRAZOLE SODIUM 40 MG: 40 TABLET, DELAYED RELEASE ORAL at 20:40

## 2025-01-07 RX ADMIN — LACTULOSE 30 G: 20 SOLUTION ORAL at 16:33

## 2025-01-07 RX ADMIN — Medication 10 ML: at 20:41

## 2025-01-07 RX ADMIN — LACTULOSE 30 G: 20 SOLUTION ORAL at 20:40

## 2025-01-07 RX ADMIN — NYSTATIN: 100000 POWDER TOPICAL at 09:35

## 2025-01-07 RX ADMIN — BUSPIRONE HYDROCHLORIDE 7.5 MG: 15 TABLET ORAL at 09:17

## 2025-01-07 RX ADMIN — CARVEDILOL 12.5 MG: 12.5 TABLET, FILM COATED ORAL at 20:40

## 2025-01-07 RX ADMIN — POTASSIUM CHLORIDE 40 MEQ: 750 CAPSULE, EXTENDED RELEASE ORAL at 15:01

## 2025-01-07 RX ADMIN — MAGNESIUM SULFATE IN WATER 2 G: 40 INJECTION, SOLUTION INTRAVENOUS at 15:01

## 2025-01-07 RX ADMIN — OXYCODONE 10 MG: 5 TABLET ORAL at 18:38

## 2025-01-07 RX ADMIN — FUROSEMIDE 80 MG: 40 TABLET ORAL at 09:19

## 2025-01-07 RX ADMIN — POTASSIUM CHLORIDE 40 MEQ: 750 CAPSULE, EXTENDED RELEASE ORAL at 18:38

## 2025-01-07 RX ADMIN — CARVEDILOL 25 MG: 25 TABLET, FILM COATED ORAL at 09:19

## 2025-01-07 RX ADMIN — LEVETIRACETAM 500 MG: 500 TABLET, FILM COATED ORAL at 20:40

## 2025-01-07 RX ADMIN — SPIRONOLACTONE 200 MG: 100 TABLET ORAL at 09:31

## 2025-01-07 RX ADMIN — FUROSEMIDE 20 MG: 20 TABLET ORAL at 09:19

## 2025-01-07 NOTE — PROGRESS NOTES
[facility]   Hospitalist Progress Note  Date: 2025  Patient Name: Nic Nicolas  : 1965  MRN: [id number]  Date of admission: 2025    Subjective   Subjective     Chief Complaint:   Altered mental status    Summary:   Nic Nicolas is a 59 y.o. male past medical history of cirrhosis secondary to NAFLD, recurrent hepatic encephalopathy admissions, hypertension, TBI, type 2 diabetes, CHF, CKD, asthma, obesity, recent GI bleed secondary to portal hypertensive gastropathy status post argon treatment who presents to the ER due to worsening confusion at his nursing home.  Patient unable to provide history due to his condition and history supplemented by discussion with the ER staff.  Appears patient is a long-term resident at [facility] and was apparently doing well until tonight shift when nursing staff at their facility noted him to be significantly more confused than he had been in the preceding days and due to his history of recurrent encephalopathy admissions he was transferred to the ER for evaluation     Upon arrival patient was hemodynamically stable and lab workup revealed significant elevated ammonia of 149, mild lactic acidosis of 2.2.  Urinalysis showed some microscopic hematuria and a CMP did not show any extensive hyperbilirubinemia.  Troponin was initially elevated and downtrended.  Magnesium low 1.4.  Chest x-ray was unremarkable.  Patient was given lactulose in the ER and he did tolerate it fine but had to be fed through a syringe for him to tolerate.  Due to his significant encephalopathy hospitalist was then contacted for admission.    Interval Followup:   Patient's mental status improved, tolerating lactulose    Objective   Objective     Vitals:   Temp:  [98.1 °F (36.7 °C)-98.2 °F (36.8 °C)] 98.2 °F (36.8 °C)  Heart Rate:  [75-83] 77  Resp:  [16-20] 16  BP: (136-161)/(59-72) 136/63    Physical Exam   GEN: No acute distress  HEENT: Moist mucous membranes  LUNGS: Equal chest  rise bilaterally  CARDIAC: Regular rate and rhythm  NEURO: Moving all 4 extremities spontaneously  SKIN: No obvious breakdown    Result Review    Result Review:  I have personally reviewed the results as below  [x]  Laboratory CBC, CMP personally reviewed  CBC          12/9/2024    05:31 1/6/2025    02:17 1/7/2025    04:59   CBC   WBC 3.22  3.39  3.95    RBC 2.98  3.31  2.98    Hemoglobin 8.1  9.1  8.1    Hematocrit 26.3  32.0  25.9    MCV 88.3  96.7  86.9    MCH 27.2  27.5  27.2    MCHC 30.8  28.4  31.3    RDW 18.8  19.0  19.0    Platelets 52  76  67      CMP          12/9/2024    05:31 1/6/2025    02:17 1/7/2025    04:59   CMP   Glucose 155  172  197    BUN 27  14  12    Creatinine 1.24  1.14  1.06    EGFR 67.4  74.5  81.3    Sodium 138  141  139    Potassium 3.3  3.5  3.0    Chloride 105  108  105    Calcium 8.4  9.1  8.7    Total Protein 5.6  6.1     Albumin 2.8  2.9     Globulin 2.8  3.2     Total Bilirubin 0.8  1.2     Alkaline Phosphatase 118  117     AST (SGOT) 47  46     ALT (SGPT) 19  24     Albumin/Globulin Ratio 1.0  0.9     BUN/Creatinine Ratio 21.8  12.3  11.3    Anion Gap 9.0  9.8  10.7      []  Microbiology  []  Radiology  []  EKG/Telemetry   []  Cardiology/Vascular   []  Pathology  []  Old records  []  Other:    Scheduled medications:  busPIRone, 7.5 mg, Oral, TID  carvedilol, 12.5 mg, Oral, Nightly  carvedilol, 25 mg, Oral, QAM  furosemide, 20 mg, Oral, Daily  furosemide, 80 mg, Oral, QAM  lactulose, 30 g, Oral, TID  levETIRAcetam, 500 mg, Oral, BID  nystatin, , Topical, Q12H  pantoprazole, 40 mg, Oral, BID  rifAXIMin, 550 mg, Oral, Q12H  sertraline, 100 mg, Oral, Nightly  sodium chloride, 10 mL, Intravenous, Q12H  spironolactone, 200 mg, Oral, Daily      As needed medications:    aluminum-magnesium hydroxide-simethicone    senna-docusate sodium **AND** polyethylene glycol **AND** bisacodyl **AND** bisacodyl    hydrOXYzine    nitroglycerin    ondansetron    oxyCODONE    sodium chloride    sodium  chloride    sodium chloride  Infusions:          Assessment & Plan   Assessment / Plan     Assessment:  Acute metabolic encephalopathy secondary to hepatic encephalopathy  Lactic acidosis likely due to poor liver clearance  Chronic cirrhosis secondary to nonalcoholic fatty liver disease, MELD = 17  Elevated troponin likely type II MI due to demand ischemia due to above  Recent GI bleed secondary to portal hypertensive gastropathy  Chronic anemia  Chronic diastolic congestive heart failure  Anasarca  CKD stage III  History of TBI  Chronic splenomegaly  Long-term nursing home resident  Pancytopenia likely secondary to cirrhosis    Plan:  Patient admitted to the hospital for further workup and management of above  Continue lactulose and Xifaxan  Lactic acid improved but elevated, suspect this is secondary to his liver disease  Denies chest pain  Continue twice daily PPI  Monitor hemoglobin closely  Increase oxycodone back to home dose  Out of bed to chair  CBC, CMP reviewed  Repeat CBC, CMP, mag and Phos in a.m.     Reviewed patient's labs and imaging, and discussed with patient and nurse at bedside.    VTE Prophylaxis:  Mechanical VTE prophylaxis orders are present.        CODE STATUS:   Code Status (Patient has no pulse and is not breathing): CPR (Attempt to Resuscitate)  Medical Interventions (Patient has pulse or is breathing): Full Support      Electronically signed by Carl Palma MD, 1/7/2025, 13:52 EST.                    None

## 2025-01-07 NOTE — PLAN OF CARE
Goal Outcome Evaluation:  Plan of Care Reviewed With: patient        Progress: improving  Outcome Evaluation: Orientation is improving.

## 2025-01-07 NOTE — PLAN OF CARE
Goal Outcome Evaluation:  Plan of Care Reviewed With: patient        Progress: improving  Outcome Evaluation: orientation is much better pt can be forgetful at times, frequently complains of pain percocet given to help, working on placement at [facility].

## 2025-01-08 LAB
ALBUMIN SERPL-MCNC: 2.6 G/DL (ref 3.5–5.2)
ALBUMIN/GLOB SERPL: 0.9 G/DL
ALP SERPL-CCNC: 103 U/L (ref 39–117)
ALT SERPL W P-5'-P-CCNC: 22 U/L (ref 1–41)
ANION GAP SERPL CALCULATED.3IONS-SCNC: 6.2 MMOL/L (ref 5–15)
AST SERPL-CCNC: 44 U/L (ref 1–40)
BASOPHILS # BLD AUTO: 0.02 10*3/MM3 (ref 0–0.2)
BASOPHILS NFR BLD AUTO: 0.5 % (ref 0–1.5)
BILIRUB SERPL-MCNC: 1.1 MG/DL (ref 0–1.2)
BUN SERPL-MCNC: 10 MG/DL (ref 6–20)
BUN/CREAT SERPL: 8.8 (ref 7–25)
CALCIUM SPEC-SCNC: 8.6 MG/DL (ref 8.6–10.5)
CHLORIDE SERPL-SCNC: 104 MMOL/L (ref 98–107)
CO2 SERPL-SCNC: 24.8 MMOL/L (ref 22–29)
CREAT SERPL-MCNC: 1.14 MG/DL (ref 0.76–1.27)
DEPRECATED RDW RBC AUTO: 60.1 FL (ref 37–54)
EGFRCR SERPLBLD CKD-EPI 2021: 74.5 ML/MIN/1.73
EOSINOPHIL # BLD AUTO: 0.09 10*3/MM3 (ref 0–0.4)
EOSINOPHIL NFR BLD AUTO: 2.4 % (ref 0.3–6.2)
ERYTHROCYTE [DISTWIDTH] IN BLOOD BY AUTOMATED COUNT: 18.8 % (ref 12.3–15.4)
GLOBULIN UR ELPH-MCNC: 2.8 GM/DL
GLUCOSE BLDC GLUCOMTR-MCNC: 238 MG/DL (ref 70–99)
GLUCOSE BLDC GLUCOMTR-MCNC: 278 MG/DL (ref 70–99)
GLUCOSE BLDC GLUCOMTR-MCNC: 282 MG/DL (ref 70–99)
GLUCOSE BLDC GLUCOMTR-MCNC: 309 MG/DL (ref 70–99)
GLUCOSE SERPL-MCNC: 248 MG/DL (ref 65–99)
HCT VFR BLD AUTO: 25 % (ref 37.5–51)
HGB BLD-MCNC: 7.8 G/DL (ref 13–17.7)
IMM GRANULOCYTES # BLD AUTO: 0.02 10*3/MM3 (ref 0–0.05)
IMM GRANULOCYTES NFR BLD AUTO: 0.5 % (ref 0–0.5)
LYMPHOCYTES # BLD AUTO: 0.49 10*3/MM3 (ref 0.7–3.1)
LYMPHOCYTES NFR BLD AUTO: 13.2 % (ref 19.6–45.3)
MAGNESIUM SERPL-MCNC: 1.4 MG/DL (ref 1.6–2.6)
MCH RBC QN AUTO: 27.2 PG (ref 26.6–33)
MCHC RBC AUTO-ENTMCNC: 31.2 G/DL (ref 31.5–35.7)
MCV RBC AUTO: 87.1 FL (ref 79–97)
MONOCYTES # BLD AUTO: 0.42 10*3/MM3 (ref 0.1–0.9)
MONOCYTES NFR BLD AUTO: 11.4 % (ref 5–12)
NEUTROPHILS NFR BLD AUTO: 2.66 10*3/MM3 (ref 1.7–7)
NEUTROPHILS NFR BLD AUTO: 72 % (ref 42.7–76)
NRBC BLD AUTO-RTO: 0 /100 WBC (ref 0–0.2)
OVALOCYTES BLD QL SMEAR: NORMAL
PHOSPHATE SERPL-MCNC: 3.3 MG/DL (ref 2.5–4.5)
PLATELET # BLD AUTO: 53 10*3/MM3 (ref 140–450)
PMV BLD AUTO: 12.2 FL (ref 6–12)
POTASSIUM SERPL-SCNC: 3.8 MMOL/L (ref 3.5–5.2)
PROT SERPL-MCNC: 5.4 G/DL (ref 6–8.5)
RBC # BLD AUTO: 2.87 10*6/MM3 (ref 4.14–5.8)
SMALL PLATELETS BLD QL SMEAR: NORMAL
SODIUM SERPL-SCNC: 135 MMOL/L (ref 136–145)
WBC MORPH BLD: NORMAL
WBC NRBC COR # BLD AUTO: 3.7 10*3/MM3 (ref 3.4–10.8)

## 2025-01-08 PROCEDURE — 97161 PT EVAL LOW COMPLEX 20 MIN: CPT

## 2025-01-08 PROCEDURE — 99232 SBSQ HOSP IP/OBS MODERATE 35: CPT | Performed by: INTERNAL MEDICINE

## 2025-01-08 PROCEDURE — 80053 COMPREHEN METABOLIC PANEL: CPT | Performed by: INTERNAL MEDICINE

## 2025-01-08 PROCEDURE — 82948 REAGENT STRIP/BLOOD GLUCOSE: CPT | Performed by: PHYSICIAN ASSISTANT

## 2025-01-08 PROCEDURE — 82948 REAGENT STRIP/BLOOD GLUCOSE: CPT

## 2025-01-08 PROCEDURE — 84100 ASSAY OF PHOSPHORUS: CPT | Performed by: INTERNAL MEDICINE

## 2025-01-08 PROCEDURE — 94799 UNLISTED PULMONARY SVC/PX: CPT

## 2025-01-08 PROCEDURE — 25010000002 MAGNESIUM SULFATE IN D5W 1G/100ML (PREMIX) 1-5 GM/100ML-% SOLUTION: Performed by: PHYSICIAN ASSISTANT

## 2025-01-08 PROCEDURE — 83735 ASSAY OF MAGNESIUM: CPT | Performed by: STUDENT IN AN ORGANIZED HEALTH CARE EDUCATION/TRAINING PROGRAM

## 2025-01-08 PROCEDURE — 85007 BL SMEAR W/DIFF WBC COUNT: CPT | Performed by: STUDENT IN AN ORGANIZED HEALTH CARE EDUCATION/TRAINING PROGRAM

## 2025-01-08 PROCEDURE — 63710000001 INSULIN LISPRO (HUMAN) PER 5 UNITS: Performed by: PHYSICIAN ASSISTANT

## 2025-01-08 PROCEDURE — 94761 N-INVAS EAR/PLS OXIMETRY MLT: CPT

## 2025-01-08 PROCEDURE — 85025 COMPLETE CBC W/AUTO DIFF WBC: CPT | Performed by: STUDENT IN AN ORGANIZED HEALTH CARE EDUCATION/TRAINING PROGRAM

## 2025-01-08 RX ORDER — MAGNESIUM SULFATE 1 G/100ML
1 INJECTION INTRAVENOUS
Status: COMPLETED | OUTPATIENT
Start: 2025-01-08 | End: 2025-01-08

## 2025-01-08 RX ORDER — IPRATROPIUM BROMIDE AND ALBUTEROL SULFATE 2.5; .5 MG/3ML; MG/3ML
3 SOLUTION RESPIRATORY (INHALATION) EVERY 4 HOURS PRN
Status: DISCONTINUED | OUTPATIENT
Start: 2025-01-08 | End: 2025-01-14 | Stop reason: HOSPADM

## 2025-01-08 RX ORDER — ZINC SULFATE 50(220)MG
220 CAPSULE ORAL DAILY
Status: DISCONTINUED | OUTPATIENT
Start: 2025-01-08 | End: 2025-01-14 | Stop reason: HOSPADM

## 2025-01-08 RX ORDER — IBUPROFEN 600 MG/1
1 TABLET ORAL
Status: DISCONTINUED | OUTPATIENT
Start: 2025-01-08 | End: 2025-01-14 | Stop reason: HOSPADM

## 2025-01-08 RX ORDER — LACTULOSE 10 G/15ML
30 SOLUTION ORAL 3 TIMES DAILY
Status: CANCELLED | OUTPATIENT
Start: 2025-01-08

## 2025-01-08 RX ORDER — FLUTICASONE PROPIONATE 50 MCG
2 SPRAY, SUSPENSION (ML) NASAL DAILY
Status: DISCONTINUED | OUTPATIENT
Start: 2025-01-08 | End: 2025-01-14 | Stop reason: HOSPADM

## 2025-01-08 RX ORDER — DEXTROSE MONOHYDRATE 25 G/50ML
25 INJECTION, SOLUTION INTRAVENOUS
Status: DISCONTINUED | OUTPATIENT
Start: 2025-01-08 | End: 2025-01-14 | Stop reason: HOSPADM

## 2025-01-08 RX ORDER — NICOTINE POLACRILEX 4 MG
15 LOZENGE BUCCAL
Status: DISCONTINUED | OUTPATIENT
Start: 2025-01-08 | End: 2025-01-14 | Stop reason: HOSPADM

## 2025-01-08 RX ORDER — INSULIN LISPRO 100 [IU]/ML
2-7 INJECTION, SOLUTION INTRAVENOUS; SUBCUTANEOUS
Status: DISCONTINUED | OUTPATIENT
Start: 2025-01-08 | End: 2025-01-14 | Stop reason: HOSPADM

## 2025-01-08 RX ORDER — CETIRIZINE HYDROCHLORIDE 10 MG/1
10 TABLET ORAL DAILY
Status: DISCONTINUED | OUTPATIENT
Start: 2025-01-08 | End: 2025-01-14 | Stop reason: HOSPADM

## 2025-01-08 RX ADMIN — FUROSEMIDE 80 MG: 40 TABLET ORAL at 06:18

## 2025-01-08 RX ADMIN — LACTULOSE 30 G: 20 SOLUTION ORAL at 09:31

## 2025-01-08 RX ADMIN — INSULIN LISPRO 3 UNITS: 100 INJECTION, SOLUTION INTRAVENOUS; SUBCUTANEOUS at 09:32

## 2025-01-08 RX ADMIN — MAGNESIUM SULFATE 1 G: 1 INJECTION INTRAVENOUS at 10:59

## 2025-01-08 RX ADMIN — OXYCODONE 10 MG: 5 TABLET ORAL at 12:31

## 2025-01-08 RX ADMIN — MAGNESIUM SULFATE 1 G: 1 INJECTION INTRAVENOUS at 09:33

## 2025-01-08 RX ADMIN — CARVEDILOL 25 MG: 25 TABLET, FILM COATED ORAL at 06:17

## 2025-01-08 RX ADMIN — BUSPIRONE HYDROCHLORIDE 7.5 MG: 15 TABLET ORAL at 16:29

## 2025-01-08 RX ADMIN — INSULIN LISPRO 3 UNITS: 100 INJECTION, SOLUTION INTRAVENOUS; SUBCUTANEOUS at 12:30

## 2025-01-08 RX ADMIN — SERTRALINE HYDROCHLORIDE 100 MG: 100 TABLET ORAL at 20:17

## 2025-01-08 RX ADMIN — OXYCODONE 10 MG: 5 TABLET ORAL at 06:18

## 2025-01-08 RX ADMIN — FLUTICASONE PROPIONATE 2 SPRAY: 50 SPRAY, METERED NASAL at 11:01

## 2025-01-08 RX ADMIN — SPIRONOLACTONE 200 MG: 100 TABLET ORAL at 09:31

## 2025-01-08 RX ADMIN — INSULIN LISPRO 4 UNITS: 100 INJECTION, SOLUTION INTRAVENOUS; SUBCUTANEOUS at 20:15

## 2025-01-08 RX ADMIN — MAGNESIUM SULFATE 1 G: 1 INJECTION INTRAVENOUS at 12:12

## 2025-01-08 RX ADMIN — HYDROXYZINE HYDROCHLORIDE 25 MG: 25 TABLET ORAL at 23:20

## 2025-01-08 RX ADMIN — RIFAXIMIN 550 MG: 550 TABLET ORAL at 09:34

## 2025-01-08 RX ADMIN — FUROSEMIDE 20 MG: 20 TABLET ORAL at 09:34

## 2025-01-08 RX ADMIN — Medication 10 ML: at 09:31

## 2025-01-08 RX ADMIN — Medication 220 MG: at 12:12

## 2025-01-08 RX ADMIN — OXYCODONE 10 MG: 5 TABLET ORAL at 00:21

## 2025-01-08 RX ADMIN — CARVEDILOL 12.5 MG: 12.5 TABLET, FILM COATED ORAL at 20:16

## 2025-01-08 RX ADMIN — Medication 10 ML: at 20:16

## 2025-01-08 RX ADMIN — PANTOPRAZOLE SODIUM 40 MG: 40 TABLET, DELAYED RELEASE ORAL at 09:32

## 2025-01-08 RX ADMIN — OXYCODONE 10 MG: 5 TABLET ORAL at 20:16

## 2025-01-08 RX ADMIN — INSULIN LISPRO 5 UNITS: 100 INJECTION, SOLUTION INTRAVENOUS; SUBCUTANEOUS at 17:11

## 2025-01-08 RX ADMIN — BUSPIRONE HYDROCHLORIDE 7.5 MG: 15 TABLET ORAL at 20:16

## 2025-01-08 RX ADMIN — LEVETIRACETAM 500 MG: 500 TABLET, FILM COATED ORAL at 09:32

## 2025-01-08 RX ADMIN — BUSPIRONE HYDROCHLORIDE 7.5 MG: 15 TABLET ORAL at 09:32

## 2025-01-08 RX ADMIN — CETIRIZINE HYDROCHLORIDE 10 MG: 10 TABLET, FILM COATED ORAL at 11:01

## 2025-01-08 RX ADMIN — NYSTATIN: 100000 POWDER TOPICAL at 20:17

## 2025-01-08 RX ADMIN — LEVETIRACETAM 500 MG: 500 TABLET, FILM COATED ORAL at 20:16

## 2025-01-08 RX ADMIN — NYSTATIN: 100000 POWDER TOPICAL at 09:34

## 2025-01-08 RX ADMIN — RIFAXIMIN 550 MG: 550 TABLET ORAL at 20:16
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
## 2025-01-08 NOTE — PROGRESS NOTES
[facility]   Hospitalist Progress Note  Date: 2025  Patient Name: Nic Nicolas  : 1965  MRN: [id number]  Date of admission: 2025    Subjective   Subjective     Chief Complaint:   Altered mental status    Summary:   Nic Nicolas is a 59 y.o. male past medical history of cirrhosis secondary to NAFLD, recurrent hepatic encephalopathy admissions, hypertension, TBI, type 2 diabetes, CHF, CKD, asthma, obesity, recent GI bleed secondary to portal hypertensive gastropathy status post argon treatment who presents to the ER due to worsening confusion at his nursing home.  Patient unable to provide history due to his condition and history supplemented by discussion with the ER staff.  Appears patient is a long-term resident at [facility] and was apparently doing well until tonight shift when nursing staff at their facility noted him to be significantly more confused than he had been in the preceding days and due to his history of recurrent encephalopathy admissions he was transferred to the ER for evaluation     Upon arrival patient was hemodynamically stable and lab workup revealed significant elevated ammonia of 149, mild lactic acidosis of 2.2.  Urinalysis showed some microscopic hematuria and a CMP did not show any extensive hyperbilirubinemia.  Troponin was initially elevated and downtrended.  Magnesium low 1.4.  Chest x-ray was unremarkable.  Patient was given lactulose in the ER and he did tolerate it fine but had to be fed through a syringe for him to tolerate.  Due to his significant encephalopathy hospitalist was then contacted for admission.    Interval Followup:  2025    Alert and conversational.  Mental status better  Getting lactulose 3 times daily  Had BM earlier this morning  Normal sinus rhythm telemetry  Glucose could be better controlled  Magnesium low      Objective   Objective     Vitals:   Temp:  [98.1 °F (36.7 °C)-98.6 °F (37 °C)] 98.1 °F (36.7 °C)  Heart Rate:   [74-85] 76  Resp:  [18] 18  BP: (112-142)/(50-75) 142/75    Physical Exam   GEN: No acute distress.  Alert and conversational.  Not fully oriented but knows he is in [facility]  HEENT: Moist mucous membranes  LUNGS: Equal chest rise bilaterally  CARDIAC: Regular rate and rhythm  NEURO: Moving all 4 extremities spontaneously  SKIN: No obvious breakdown    Result Review    Result Review:  I have personally reviewed the results as below  [x]  Laboratory CBC, CMP personally reviewed  CBC          1/6/2025    02:17 1/7/2025    04:59 1/8/2025    04:50   CBC   WBC 3.39  3.95  3.70    RBC 3.31  2.98  2.87    Hemoglobin 9.1  8.1  7.8    Hematocrit 32.0  25.9  25.0    MCV 96.7  86.9  87.1    MCH 27.5  27.2  27.2    MCHC 28.4  31.3  31.2    RDW 19.0  19.0  18.8    Platelets 76  67  53      CMP          1/6/2025    02:17 1/7/2025    04:59 1/8/2025    04:50   CMP   Glucose 172  197  248    BUN 14  12  10    Creatinine 1.14  1.06  1.14    EGFR 74.5  81.3  74.5    Sodium 141  139  135    Potassium 3.5  3.0  3.8    Chloride 108  105  104    Calcium 9.1  8.7  8.6    Total Protein 6.1   5.4    Albumin 2.9   2.6    Globulin 3.2   2.8    Total Bilirubin 1.2   1.1    Alkaline Phosphatase 117   103    AST (SGOT) 46   44    ALT (SGPT) 24   22    Albumin/Globulin Ratio 0.9   0.9    BUN/Creatinine Ratio 12.3  11.3  8.8    Anion Gap 9.8  10.7  6.2      []  Microbiology  []  Radiology  []  EKG/Telemetry   []  Cardiology/Vascular   []  Pathology  []  Old records  []  Other:    Scheduled medications:  busPIRone, 7.5 mg, Oral, TID  carvedilol, 12.5 mg, Oral, Nightly  carvedilol, 25 mg, Oral, QAM  cetirizine, 10 mg, Oral, Daily  fluticasone, 2 spray, Each Nare, Daily  furosemide, 80 mg, Oral, QAM  insulin lispro, 2-7 Units, Subcutaneous, 4x Daily AC & at Bedtime  lactulose, 30 g, Oral, TID  levETIRAcetam, 500 mg, Oral, BID  magnesium sulfate in D5W 1g/100mL (PREMIX), 1 g, Intravenous, Q1H  nystatin, , Topical, Q12H  rifAXIMin, 550 mg, Oral,  Q12H  sertraline, 100 mg, Oral, Nightly  sodium chloride, 10 mL, Intravenous, Q12H  spironolactone, 200 mg, Oral, Daily      As needed medications:    aluminum-magnesium hydroxide-simethicone    senna-docusate sodium **AND** polyethylene glycol **AND** bisacodyl **AND** bisacodyl    dextrose    dextrose    glucagon (human recombinant)    hydrOXYzine    ipratropium-albuterol    nitroglycerin    ondansetron    oxyCODONE    sodium chloride    sodium chloride    sodium chloride  Infusions:          Assessment & Plan   Assessment / Plan     Assessment:    Acute metabolic encephalopathy secondary to hepatic encephalopathy  Lactic acidosis likely due to poor liver clearance  Chronic cirrhosis secondary to nonalcoholic fatty liver disease, MELD = 17  Elevated troponin likely type II MI due to demand ischemia due to above  Recent GI bleed secondary to portal hypertensive gastropathy  Chronic anemia  Chronic diastolic congestive heart failure  Anasarca  CKD stage III  History of TBI  Chronic splenomegaly  DM  Long-term nursing home resident  Pancytopenia likely secondary to cirrhosis    Plan:  Patient admitted to the hospital for further workup and management of above  Add Zinc  Add sliding scale insulin coverage  Continue lactulose for goal of 3 BMs daily   Continue Xifaxan  Lactic acid improved  Denies chest pain  Continue twice daily PPI  Monitor hemoglobin closely  Adjust oral pain meds as needed ... Continue home dose  Out of bed to chair  CBC, CMP reviewed  Repeat CBC, CMP, mag and Phos in a.m.     Reviewed patient's labs and imaging, and discussed with patient and nurse at bedside.    VTE Prophylaxis:  Mechanical VTE prophylaxis orders are present.    CODE STATUS:   Code Status (Patient has no pulse and is not breathing): CPR (Attempt to Resuscitate)  Medical Interventions (Patient has pulse or is breathing): Full Support         Patient independently seen and evaluated, agree with assessment and plan, above  documentation reflects plan put forth during bedside rounds.  More than 51% of the time of this patient encounter was performed by me.    Interval history:  No acute events overnight, having good bowel movements, mental status improved    GEN: No acute distress  HEENT: Moist mucous membranes  LUNGS: Equal chest rise bilaterally  CARDIAC: Regular rate and rhythm  NEURO: Moving all 4 extremities spontaneously  SKIN: No obvious breakdown    Plan:  Agree with assessment and plan as above  Add zinc  Continue Xifaxan, lactulose  Continue PPI  Okay to increase oral pain medicine, monitor for sedation  Out of bed to chair  PT/OT  Sliding-scale insulin  Clinical course will dictate further management  CBC, CMP reviewed  Repeat CBC, CMP, mag and Phos in a.m.      Electronically signed by Carl Palma MD, 1/8/2025, 12:21 EST.

## 2025-01-08 NOTE — PLAN OF CARE
Goal Outcome Evaluation:  Plan of Care Reviewed With: patient        Progress: improving  Outcome Evaluation: Mentation improving.

## 2025-01-09 ENCOUNTER — APPOINTMENT (OUTPATIENT)
Dept: INTERVENTIONAL RADIOLOGY/VASCULAR | Facility: HOSPITAL | Age: 60
End: 2025-01-09
Payer: MEDICAID

## 2025-01-09 LAB
ALBUMIN SERPL-MCNC: 2.6 G/DL (ref 3.5–5.2)
ALBUMIN/GLOB SERPL: 0.9 G/DL
ALP SERPL-CCNC: 111 U/L (ref 39–117)
ALT SERPL W P-5'-P-CCNC: 23 U/L (ref 1–41)
AMMONIA BLD-SCNC: 95 UMOL/L (ref 16–60)
ANION GAP SERPL CALCULATED.3IONS-SCNC: 7.1 MMOL/L (ref 5–15)
APTT PPP: 39.6 SECONDS (ref 24.2–34.2)
AST SERPL-CCNC: 40 U/L (ref 1–40)
BASOPHILS # BLD AUTO: 0.02 10*3/MM3 (ref 0–0.2)
BASOPHILS NFR BLD AUTO: 0.5 % (ref 0–1.5)
BILIRUB SERPL-MCNC: 1.4 MG/DL (ref 0–1.2)
BUN SERPL-MCNC: 14 MG/DL (ref 6–20)
BUN/CREAT SERPL: 12 (ref 7–25)
CALCIUM SPEC-SCNC: 8.6 MG/DL (ref 8.6–10.5)
CHLORIDE SERPL-SCNC: 103 MMOL/L (ref 98–107)
CO2 SERPL-SCNC: 24.9 MMOL/L (ref 22–29)
CREAT SERPL-MCNC: 1.17 MG/DL (ref 0.76–1.27)
DEPRECATED RDW RBC AUTO: 61 FL (ref 37–54)
EGFRCR SERPLBLD CKD-EPI 2021: 72.3 ML/MIN/1.73
EOSINOPHIL # BLD AUTO: 0.12 10*3/MM3 (ref 0–0.4)
EOSINOPHIL NFR BLD AUTO: 3 % (ref 0.3–6.2)
ERYTHROCYTE [DISTWIDTH] IN BLOOD BY AUTOMATED COUNT: 18.3 % (ref 12.3–15.4)
GLOBULIN UR ELPH-MCNC: 2.9 GM/DL
GLUCOSE BLDC GLUCOMTR-MCNC: 158 MG/DL (ref 70–99)
GLUCOSE BLDC GLUCOMTR-MCNC: 159 MG/DL (ref 70–99)
GLUCOSE BLDC GLUCOMTR-MCNC: 201 MG/DL (ref 70–99)
GLUCOSE BLDC GLUCOMTR-MCNC: 268 MG/DL (ref 70–99)
GLUCOSE SERPL-MCNC: 173 MG/DL (ref 65–99)
HCT VFR BLD AUTO: 27.2 % (ref 37.5–51)
HGB BLD-MCNC: 8.4 G/DL (ref 13–17.7)
IMM GRANULOCYTES # BLD AUTO: 0.02 10*3/MM3 (ref 0–0.05)
IMM GRANULOCYTES NFR BLD AUTO: 0.5 % (ref 0–0.5)
INR PPP: 2.09 (ref 0.86–1.15)
LYMPHOCYTES # BLD AUTO: 0.71 10*3/MM3 (ref 0.7–3.1)
LYMPHOCYTES NFR BLD AUTO: 18 % (ref 19.6–45.3)
MAGNESIUM SERPL-MCNC: 1.4 MG/DL (ref 1.6–2.6)
MCH RBC QN AUTO: 27.8 PG (ref 26.6–33)
MCHC RBC AUTO-ENTMCNC: 30.9 G/DL (ref 31.5–35.7)
MCV RBC AUTO: 90.1 FL (ref 79–97)
MONOCYTES # BLD AUTO: 0.41 10*3/MM3 (ref 0.1–0.9)
MONOCYTES NFR BLD AUTO: 10.4 % (ref 5–12)
NEUTROPHILS NFR BLD AUTO: 2.67 10*3/MM3 (ref 1.7–7)
NEUTROPHILS NFR BLD AUTO: 67.6 % (ref 42.7–76)
NRBC BLD AUTO-RTO: 0 /100 WBC (ref 0–0.2)
PHOSPHATE SERPL-MCNC: 3 MG/DL (ref 2.5–4.5)
PLATELET # BLD AUTO: 66 10*3/MM3 (ref 140–450)
PMV BLD AUTO: 12.3 FL (ref 6–12)
POTASSIUM SERPL-SCNC: 4.2 MMOL/L (ref 3.5–5.2)
PROT SERPL-MCNC: 5.5 G/DL (ref 6–8.5)
PROTHROMBIN TIME: 23.9 SECONDS (ref 11.8–14.9)
RBC # BLD AUTO: 3.02 10*6/MM3 (ref 4.14–5.8)
SODIUM SERPL-SCNC: 135 MMOL/L (ref 136–145)
WBC NRBC COR # BLD AUTO: 3.95 10*3/MM3 (ref 3.4–10.8)

## 2025-01-09 PROCEDURE — 82948 REAGENT STRIP/BLOOD GLUCOSE: CPT | Performed by: PHYSICIAN ASSISTANT

## 2025-01-09 PROCEDURE — 80053 COMPREHEN METABOLIC PANEL: CPT | Performed by: PHYSICIAN ASSISTANT

## 2025-01-09 PROCEDURE — 84100 ASSAY OF PHOSPHORUS: CPT | Performed by: PHYSICIAN ASSISTANT

## 2025-01-09 PROCEDURE — 63710000001 INSULIN LISPRO (HUMAN) PER 5 UNITS: Performed by: PHYSICIAN ASSISTANT

## 2025-01-09 PROCEDURE — 25010000002 MAGNESIUM SULFATE IN D5W 1G/100ML (PREMIX) 1-5 GM/100ML-% SOLUTION: Performed by: PHYSICIAN ASSISTANT

## 2025-01-09 PROCEDURE — 82948 REAGENT STRIP/BLOOD GLUCOSE: CPT

## 2025-01-09 PROCEDURE — 85730 THROMBOPLASTIN TIME PARTIAL: CPT | Performed by: INTERNAL MEDICINE

## 2025-01-09 PROCEDURE — 85610 PROTHROMBIN TIME: CPT | Performed by: INTERNAL MEDICINE

## 2025-01-09 PROCEDURE — 83735 ASSAY OF MAGNESIUM: CPT | Performed by: PHYSICIAN ASSISTANT

## 2025-01-09 PROCEDURE — 82140 ASSAY OF AMMONIA: CPT | Performed by: INTERNAL MEDICINE

## 2025-01-09 PROCEDURE — 85025 COMPLETE CBC W/AUTO DIFF WBC: CPT | Performed by: PHYSICIAN ASSISTANT

## 2025-01-09 PROCEDURE — 25010000002 ONDANSETRON PER 1 MG: Performed by: STUDENT IN AN ORGANIZED HEALTH CARE EDUCATION/TRAINING PROGRAM

## 2025-01-09 PROCEDURE — 99232 SBSQ HOSP IP/OBS MODERATE 35: CPT | Performed by: INTERNAL MEDICINE

## 2025-01-09 PROCEDURE — 76942 ECHO GUIDE FOR BIOPSY: CPT

## 2025-01-09 RX ORDER — MAGNESIUM SULFATE 1 G/100ML
1 INJECTION INTRAVENOUS
Status: COMPLETED | OUTPATIENT
Start: 2025-01-09 | End: 2025-01-09

## 2025-01-09 RX ADMIN — MAGNESIUM SULFATE HEPTAHYDRATE 1 G: 1 INJECTION, SOLUTION INTRAVENOUS at 09:36

## 2025-01-09 RX ADMIN — FUROSEMIDE 80 MG: 40 TABLET ORAL at 06:05

## 2025-01-09 RX ADMIN — MAGNESIUM SULFATE HEPTAHYDRATE 1 G: 1 INJECTION, SOLUTION INTRAVENOUS at 12:16

## 2025-01-09 RX ADMIN — LEVETIRACETAM 500 MG: 500 TABLET, FILM COATED ORAL at 08:14

## 2025-01-09 RX ADMIN — INSULIN LISPRO 4 UNITS: 100 INJECTION, SOLUTION INTRAVENOUS; SUBCUTANEOUS at 16:55

## 2025-01-09 RX ADMIN — BUSPIRONE HYDROCHLORIDE 7.5 MG: 15 TABLET ORAL at 20:57

## 2025-01-09 RX ADMIN — RIFAXIMIN 550 MG: 550 TABLET ORAL at 20:57

## 2025-01-09 RX ADMIN — CARVEDILOL 25 MG: 25 TABLET, FILM COATED ORAL at 06:05

## 2025-01-09 RX ADMIN — Medication 10 ML: at 08:14

## 2025-01-09 RX ADMIN — OXYCODONE 10 MG: 5 TABLET ORAL at 20:56

## 2025-01-09 RX ADMIN — INSULIN LISPRO 2 UNITS: 100 INJECTION, SOLUTION INTRAVENOUS; SUBCUTANEOUS at 08:13

## 2025-01-09 RX ADMIN — MAGNESIUM SULFATE HEPTAHYDRATE 1 G: 1 INJECTION, SOLUTION INTRAVENOUS at 11:04

## 2025-01-09 RX ADMIN — NYSTATIN: 100000 POWDER TOPICAL at 20:56

## 2025-01-09 RX ADMIN — CARVEDILOL 12.5 MG: 12.5 TABLET, FILM COATED ORAL at 20:56

## 2025-01-09 RX ADMIN — LEVETIRACETAM 500 MG: 500 TABLET, FILM COATED ORAL at 20:56

## 2025-01-09 RX ADMIN — RIFAXIMIN 550 MG: 550 TABLET ORAL at 08:13

## 2025-01-09 RX ADMIN — INSULIN LISPRO 3 UNITS: 100 INJECTION, SOLUTION INTRAVENOUS; SUBCUTANEOUS at 21:01

## 2025-01-09 RX ADMIN — Medication 220 MG: at 08:13

## 2025-01-09 RX ADMIN — ONDANSETRON 4 MG: 2 INJECTION INTRAMUSCULAR; INTRAVENOUS at 09:42

## 2025-01-09 RX ADMIN — SERTRALINE HYDROCHLORIDE 100 MG: 100 TABLET ORAL at 20:56

## 2025-01-09 RX ADMIN — LACTULOSE 30 G: 20 SOLUTION ORAL at 08:14

## 2025-01-09 RX ADMIN — OXYCODONE 10 MG: 5 TABLET ORAL at 14:48

## 2025-01-09 RX ADMIN — OXYCODONE 10 MG: 5 TABLET ORAL at 02:32

## 2025-01-09 RX ADMIN — NYSTATIN: 100000 POWDER TOPICAL at 08:14

## 2025-01-09 RX ADMIN — OXYCODONE 10 MG: 5 TABLET ORAL at 08:13

## 2025-01-09 RX ADMIN — BUSPIRONE HYDROCHLORIDE 7.5 MG: 15 TABLET ORAL at 16:24

## 2025-01-09 RX ADMIN — FLUTICASONE PROPIONATE 2 SPRAY: 50 SPRAY, METERED NASAL at 08:15

## 2025-01-09 RX ADMIN — SPIRONOLACTONE 200 MG: 100 TABLET ORAL at 08:14

## 2025-01-09 RX ADMIN — BUSPIRONE HYDROCHLORIDE 7.5 MG: 15 TABLET ORAL at 08:14

## 2025-01-09 RX ADMIN — Medication 10 ML: at 20:56

## 2025-01-09 RX ADMIN — CETIRIZINE HYDROCHLORIDE 10 MG: 10 TABLET, FILM COATED ORAL at 08:13

## 2025-01-09 NOTE — PROGRESS NOTES
[facility]   Hospitalist Progress Note  Date: 2025  Patient Name: Nic Nicolas  : 1965  MRN: [id number]  Date of admission: 2025    Subjective   Subjective     Chief Complaint:   Altered mental status    Summary:   Nic Nicolas is a 59 y.o. male past medical history of cirrhosis secondary to NAFLD, recurrent hepatic encephalopathy admissions, hypertension, TBI, type 2 diabetes, CHF, CKD, asthma, obesity, recent GI bleed secondary to portal hypertensive gastropathy status post argon treatment who presents to the ER due to worsening confusion at his nursing home.  Patient unable to provide history due to his condition and history supplemented by discussion with the ER staff.  Appears patient is a long-term resident at [facility] and was apparently doing well until tonight shift when nursing staff at their facility noted him to be significantly more confused than he had been in the preceding days and due to his history of recurrent encephalopathy admissions he was transferred to the ER for evaluation     Upon arrival patient was hemodynamically stable and lab workup revealed significant elevated ammonia of 149, mild lactic acidosis of 2.2.  Urinalysis showed some microscopic hematuria and a CMP did not show any extensive hyperbilirubinemia.  Troponin was initially elevated and downtrended.  Magnesium low 1.4.  Chest x-ray was unremarkable.  Patient was given lactulose in the ER and he did tolerate it fine but had to be fed through a syringe for him to tolerate.  Due to his significant encephalopathy hospitalist was then contacted for admission.    Interval Followup:  2025    Alert and conversational.   3 BMs yesterday.  Tolerating lactulose.  Normal sinus rhythm telemetry  Magnesium low  Abdomen distended.  Likely needing paracentesis again.  Will arrange with IR      Objective   Objective     Vitals:   Temp:  [97.6 °F (36.4 °C)-98.1 °F (36.7 °C)] 97.7 °F (36.5 °C)  Heart Rate:   [69-84] 84  Resp:  [18-20] 20  BP: (119-146)/(56-78) 146/78    Physical Exam   GEN: No acute distress.  Alert and conversational.  Not fully oriented but knows he is in [facility]  HEENT: Moist mucous membranes  LUNGS: Equal chest rise bilaterally  CARDIAC: Regular rate and rhythm  NEURO: Moving all 4 extremities spontaneously  SKIN: No obvious breakdown    Result Review    Result Review:  I have personally reviewed the results as below  [x]  Laboratory CBC, CMP personally reviewed  CBC          1/7/2025    04:59 1/8/2025    04:50 1/9/2025    05:19   CBC   WBC 3.95  3.70  3.95    RBC 2.98  2.87  3.02    Hemoglobin 8.1  7.8  8.4    Hematocrit 25.9  25.0  27.2    MCV 86.9  87.1  90.1    MCH 27.2  27.2  27.8    MCHC 31.3  31.2  30.9    RDW 19.0  18.8  18.3    Platelets 67  53  66      CMP          1/7/2025    04:59 1/8/2025    04:50 1/9/2025    05:19   CMP   Glucose 197  248  173    BUN 12  10  14    Creatinine 1.06  1.14  1.17    EGFR 81.3  74.5  72.3    Sodium 139  135  135    Potassium 3.0  3.8  4.2    Chloride 105  104  103    Calcium 8.7  8.6  8.6    Total Protein  5.4  5.5    Albumin  2.6  2.6    Globulin  2.8  2.9    Total Bilirubin  1.1  1.4    Alkaline Phosphatase  103  111    AST (SGOT)  44  40    ALT (SGPT)  22  23    Albumin/Globulin Ratio  0.9  0.9    BUN/Creatinine Ratio 11.3  8.8  12.0    Anion Gap 10.7  6.2  7.1      []  Microbiology  []  Radiology  []  EKG/Telemetry   []  Cardiology/Vascular   []  Pathology  []  Old records  []  Other:    Scheduled medications:  busPIRone, 7.5 mg, Oral, TID  carvedilol, 12.5 mg, Oral, Nightly  carvedilol, 25 mg, Oral, QAM  cetirizine, 10 mg, Oral, Daily  fluticasone, 2 spray, Each Nare, Daily  furosemide, 80 mg, Oral, QAM  insulin lispro, 2-7 Units, Subcutaneous, 4x Daily AC & at Bedtime  lactulose, 30 g, Oral, TID  levETIRAcetam, 500 mg, Oral, BID  nystatin, , Topical, Q12H  rifAXIMin, 550 mg, Oral, Q12H  sertraline, 100 mg, Oral, Nightly  sodium chloride, 10 mL,  Intravenous, Q12H  spironolactone, 200 mg, Oral, Daily  zinc sulfate, 220 mg, Oral, Daily      As needed medications:    aluminum-magnesium hydroxide-simethicone    senna-docusate sodium **AND** polyethylene glycol **AND** bisacodyl **AND** bisacodyl    dextrose    dextrose    glucagon (human recombinant)    hydrOXYzine    ipratropium-albuterol    nitroglycerin    ondansetron    oxyCODONE    sodium chloride    sodium chloride    sodium chloride  Infusions:          Assessment & Plan   Assessment / Plan     Assessment:    Acute metabolic encephalopathy secondary to hepatic encephalopathy  Lactic acidosis likely due to poor liver clearance  Chronic cirrhosis secondary to nonalcoholic fatty liver disease, MELD = 17  Elevated troponin likely type II MI due to demand ischemia due to above  Recent GI bleed secondary to portal hypertensive gastropathy  Chronic anemia  Chronic diastolic congestive heart failure  Anasarca  CKD stage III  History of TBI  Chronic splenomegaly  DM  Long-term nursing home resident  Pancytopenia likely secondary to cirrhosis    Plan:  IR for US/paracentesis  Add Zinc  Add sliding scale insulin coverage  Continue lactulose for goal of 3 BMs daily   Continue Xifaxan  Lactic acid improved  Denies chest pain  Continue twice daily PPI  Monitor hemoglobin closely  Adjust oral pain meds as needed ... Continue home dose  Out of bed to chair  CBC, CMP reviewed  Repeat CBC, CMP, mag and Phos in a.m.     Reviewed patient's labs and imaging, and discussed with patient and nurse at bedside.    VTE Prophylaxis:  Mechanical VTE prophylaxis orders are present.    CODE STATUS:   Code Status (Patient has no pulse and is not breathing): CPR (Attempt to Resuscitate)  Medical Interventions (Patient has pulse or is breathing): Full Support         Patient independently seen and evaluated, agree with assessment and plan, above documentation reflects plan put forth during bedside rounds.  More than 51% of the time of  this patient encounter was performed by me.    Interval history:  Patient states he feels poorly today, denies any confusion or lethargy    GEN: No acute distress  HEENT: Moist mucous membranes  LUNGS: Equal chest rise bilaterally  CARDIAC: Regular rate and rhythm  NEURO: Moving all 4 extremities spontaneously  SKIN: No obvious breakdown    Plan:  Agree with assessment and plan as above  Continue zinc  Continue Xifaxan, lactulose  Repeat ammonia level today  Continue PPI  Continue current pain regimen  Out of bed to chair  PT/OT  Continue sliding-scale insulin  Clinical course will dictate further management  Will work on scheduling paracentesis possibly for today  CBC, CMP reviewed 1/9/2025   Repeat CBC, CMP, mag and Phos in a.m. 1/9/2025       Electronically signed by Carl Palma MD, 1/9/2025, 12:16 EST.

## 2025-01-09 NOTE — PLAN OF CARE
Goal Outcome Evaluation:  Plan of Care Reviewed With: patient        Progress: no change  Outcome Evaluation: Pt went for thoracentesis today, no fluid to be drained off. BM x 3, evening dose of Lactulose held. Ammonia level repeated, down to 95 from 149.

## 2025-01-09 NOTE — PLAN OF CARE
Goal Outcome Evaluation:  Plan of Care Reviewed With: patient        Progress: no change

## 2025-01-10 LAB
ALBUMIN SERPL-MCNC: 2.7 G/DL (ref 3.5–5.2)
ALBUMIN/GLOB SERPL: 1 G/DL
ALP SERPL-CCNC: 118 U/L (ref 39–117)
ALT SERPL W P-5'-P-CCNC: 20 U/L (ref 1–41)
ANION GAP SERPL CALCULATED.3IONS-SCNC: 8.1 MMOL/L (ref 5–15)
AST SERPL-CCNC: 38 U/L (ref 1–40)
BASOPHILS # BLD AUTO: 0.02 10*3/MM3 (ref 0–0.2)
BASOPHILS NFR BLD AUTO: 0.6 % (ref 0–1.5)
BILIRUB SERPL-MCNC: 1.4 MG/DL (ref 0–1.2)
BUN SERPL-MCNC: 17 MG/DL (ref 6–20)
BUN/CREAT SERPL: 14.9 (ref 7–25)
CALCIUM SPEC-SCNC: 8.3 MG/DL (ref 8.6–10.5)
CHLORIDE SERPL-SCNC: 104 MMOL/L (ref 98–107)
CO2 SERPL-SCNC: 25.9 MMOL/L (ref 22–29)
CREAT SERPL-MCNC: 1.14 MG/DL (ref 0.76–1.27)
DEPRECATED RDW RBC AUTO: 59.6 FL (ref 37–54)
EGFRCR SERPLBLD CKD-EPI 2021: 74.5 ML/MIN/1.73
EOSINOPHIL # BLD AUTO: 0.12 10*3/MM3 (ref 0–0.4)
EOSINOPHIL NFR BLD AUTO: 3.4 % (ref 0.3–6.2)
ERYTHROCYTE [DISTWIDTH] IN BLOOD BY AUTOMATED COUNT: 18.1 % (ref 12.3–15.4)
GLOBULIN UR ELPH-MCNC: 2.7 GM/DL
GLUCOSE BLDC GLUCOMTR-MCNC: 139 MG/DL (ref 70–99)
GLUCOSE BLDC GLUCOMTR-MCNC: 208 MG/DL (ref 70–99)
GLUCOSE BLDC GLUCOMTR-MCNC: 221 MG/DL (ref 70–99)
GLUCOSE BLDC GLUCOMTR-MCNC: 242 MG/DL (ref 70–99)
GLUCOSE SERPL-MCNC: 152 MG/DL (ref 65–99)
HCT VFR BLD AUTO: 25.8 % (ref 37.5–51)
HGB BLD-MCNC: 8.1 G/DL (ref 13–17.7)
IMM GRANULOCYTES # BLD AUTO: 0.02 10*3/MM3 (ref 0–0.05)
IMM GRANULOCYTES NFR BLD AUTO: 0.6 % (ref 0–0.5)
LYMPHOCYTES # BLD AUTO: 0.72 10*3/MM3 (ref 0.7–3.1)
LYMPHOCYTES NFR BLD AUTO: 20.4 % (ref 19.6–45.3)
MAGNESIUM SERPL-MCNC: 1.7 MG/DL (ref 1.6–2.6)
MCH RBC QN AUTO: 27.7 PG (ref 26.6–33)
MCHC RBC AUTO-ENTMCNC: 31.4 G/DL (ref 31.5–35.7)
MCV RBC AUTO: 88.4 FL (ref 79–97)
MONOCYTES # BLD AUTO: 0.32 10*3/MM3 (ref 0.1–0.9)
MONOCYTES NFR BLD AUTO: 9.1 % (ref 5–12)
NEUTROPHILS NFR BLD AUTO: 2.33 10*3/MM3 (ref 1.7–7)
NEUTROPHILS NFR BLD AUTO: 65.9 % (ref 42.7–76)
NRBC BLD AUTO-RTO: 0 /100 WBC (ref 0–0.2)
PHOSPHATE SERPL-MCNC: 3.1 MG/DL (ref 2.5–4.5)
PLATELET # BLD AUTO: 65 10*3/MM3 (ref 140–450)
PMV BLD AUTO: 12.1 FL (ref 6–12)
POTASSIUM SERPL-SCNC: 4 MMOL/L (ref 3.5–5.2)
PROT SERPL-MCNC: 5.4 G/DL (ref 6–8.5)
RBC # BLD AUTO: 2.92 10*6/MM3 (ref 4.14–5.8)
SODIUM SERPL-SCNC: 138 MMOL/L (ref 136–145)
WBC NRBC COR # BLD AUTO: 3.53 10*3/MM3 (ref 3.4–10.8)

## 2025-01-10 PROCEDURE — 80053 COMPREHEN METABOLIC PANEL: CPT | Performed by: PHYSICIAN ASSISTANT

## 2025-01-10 PROCEDURE — 99232 SBSQ HOSP IP/OBS MODERATE 35: CPT | Performed by: INTERNAL MEDICINE

## 2025-01-10 PROCEDURE — 84100 ASSAY OF PHOSPHORUS: CPT | Performed by: PHYSICIAN ASSISTANT

## 2025-01-10 PROCEDURE — 85025 COMPLETE CBC W/AUTO DIFF WBC: CPT | Performed by: PHYSICIAN ASSISTANT

## 2025-01-10 PROCEDURE — 63710000001 INSULIN LISPRO (HUMAN) PER 5 UNITS: Performed by: PHYSICIAN ASSISTANT

## 2025-01-10 PROCEDURE — 82948 REAGENT STRIP/BLOOD GLUCOSE: CPT

## 2025-01-10 PROCEDURE — 82948 REAGENT STRIP/BLOOD GLUCOSE: CPT | Performed by: PHYSICIAN ASSISTANT

## 2025-01-10 PROCEDURE — 83735 ASSAY OF MAGNESIUM: CPT | Performed by: PHYSICIAN ASSISTANT

## 2025-01-10 RX ADMIN — LEVETIRACETAM 500 MG: 500 TABLET, FILM COATED ORAL at 09:04

## 2025-01-10 RX ADMIN — SERTRALINE HYDROCHLORIDE 100 MG: 100 TABLET ORAL at 20:00

## 2025-01-10 RX ADMIN — BUSPIRONE HYDROCHLORIDE 7.5 MG: 15 TABLET ORAL at 20:00

## 2025-01-10 RX ADMIN — BUSPIRONE HYDROCHLORIDE 7.5 MG: 15 TABLET ORAL at 15:40

## 2025-01-10 RX ADMIN — Medication 10 ML: at 09:06

## 2025-01-10 RX ADMIN — BUSPIRONE HYDROCHLORIDE 7.5 MG: 15 TABLET ORAL at 09:05

## 2025-01-10 RX ADMIN — OXYCODONE 10 MG: 5 TABLET ORAL at 18:03

## 2025-01-10 RX ADMIN — Medication 10 ML: at 20:01

## 2025-01-10 RX ADMIN — CETIRIZINE HYDROCHLORIDE 10 MG: 10 TABLET, FILM COATED ORAL at 09:05

## 2025-01-10 RX ADMIN — Medication 220 MG: at 09:05

## 2025-01-10 RX ADMIN — INSULIN LISPRO 3 UNITS: 100 INJECTION, SOLUTION INTRAVENOUS; SUBCUTANEOUS at 20:00

## 2025-01-10 RX ADMIN — CARVEDILOL 25 MG: 25 TABLET, FILM COATED ORAL at 06:00

## 2025-01-10 RX ADMIN — SPIRONOLACTONE 200 MG: 100 TABLET ORAL at 09:04

## 2025-01-10 RX ADMIN — RIFAXIMIN 550 MG: 550 TABLET ORAL at 20:00

## 2025-01-10 RX ADMIN — INSULIN LISPRO 3 UNITS: 100 INJECTION, SOLUTION INTRAVENOUS; SUBCUTANEOUS at 17:31

## 2025-01-10 RX ADMIN — LACTULOSE 30 G: 20 SOLUTION ORAL at 15:40

## 2025-01-10 RX ADMIN — LEVETIRACETAM 500 MG: 500 TABLET, FILM COATED ORAL at 20:00

## 2025-01-10 RX ADMIN — OXYCODONE 10 MG: 5 TABLET ORAL at 11:58

## 2025-01-10 RX ADMIN — INSULIN LISPRO 3 UNITS: 100 INJECTION, SOLUTION INTRAVENOUS; SUBCUTANEOUS at 11:58

## 2025-01-10 RX ADMIN — LACTULOSE 30 G: 20 SOLUTION ORAL at 09:03

## 2025-01-10 RX ADMIN — NYSTATIN: 100000 POWDER TOPICAL at 20:05

## 2025-01-10 RX ADMIN — OXYCODONE 10 MG: 5 TABLET ORAL at 05:57

## 2025-01-10 RX ADMIN — RIFAXIMIN 550 MG: 550 TABLET ORAL at 09:04

## 2025-01-10 RX ADMIN — NYSTATIN: 100000 POWDER TOPICAL at 09:06

## 2025-01-10 RX ADMIN — FLUTICASONE PROPIONATE 2 SPRAY: 50 SPRAY, METERED NASAL at 09:17

## 2025-01-10 RX ADMIN — FUROSEMIDE 80 MG: 40 TABLET ORAL at 06:00

## 2025-01-10 NOTE — PROGRESS NOTES
[facility]   Hospitalist Progress Note  Date: 1/10/2025  Patient Name: Nic Nicolas  : 1965  MRN: [id number]  Date of admission: 2025    Subjective   Subjective     Chief Complaint:   Altered mental status    Summary:   Nic Nicolas is a 59 y.o. male past medical history of cirrhosis secondary to NAFLD, recurrent hepatic encephalopathy admissions, hypertension, TBI, type 2 diabetes, CHF, CKD, asthma, obesity, recent GI bleed secondary to portal hypertensive gastropathy status post argon treatment who presents to the ER due to worsening confusion at his nursing home.  Patient unable to provide history due to his condition and history supplemented by discussion with the ER staff.  Appears patient is a long-term resident at [facility] and was apparently doing well until tonight shift when nursing staff at their facility noted him to be significantly more confused than he had been in the preceding days and due to his history of recurrent encephalopathy admissions he was transferred to the ER for evaluation     Upon arrival patient was hemodynamically stable and lab workup revealed significant elevated ammonia of 149, mild lactic acidosis of 2.2.  Urinalysis showed some microscopic hematuria and a CMP did not show any extensive hyperbilirubinemia.  Troponin was initially elevated and downtrended.  Magnesium low 1.4.  Chest x-ray was unremarkable.  Patient was given lactulose in the ER and he did tolerate it fine but had to be fed through a syringe for him to tolerate.  Due to his significant encephalopathy hospitalist was then contacted for admission.    Interval Followup:  1/10/2025    Ammonia level improved but still elevated.  Tolerating lactulose.  Multiple BMs a day.  Ultrasound yesterday showed no significant accumulation of ascitic fluid (no paracentesis performed)  Appetite okay  BP okay  Glucose reasonably controlled 130-200  T. bili 1.4  Creatinine stable 1.1  Patient reports  difficulty with sleeping.  Will transfer to regular bed/[facility].      Objective   Objective     Vitals:   Temp:  [97.3 °F (36.3 °C)-98.1 °F (36.7 °C)] 97.3 °F (36.3 °C)  Heart Rate:  [72-84] 74  Resp:  [18-20] 18  BP: (107-146)/(60-78) 107/60    Physical Exam   GEN: No acute distress.  Alert and conversational.  Not fully oriented but knows he is in [employer] [facility]  HEENT: Moist mucous membranes  LUNGS: Equal chest rise bilaterally  CARDIAC: Regular rate and rhythm  NEURO: Moving all 4 extremities spontaneously  SKIN: No obvious breakdown    Result Review    Result Review:  I have personally reviewed the results as below  [x]  Laboratory CBC, CMP personally reviewed  CBC          1/8/2025    04:50 1/9/2025    05:19 1/10/2025    04:48   CBC   WBC 3.70  3.95  3.53    RBC 2.87  3.02  2.92    Hemoglobin 7.8  8.4  8.1    Hematocrit 25.0  27.2  25.8    MCV 87.1  90.1  88.4    MCH 27.2  27.8  27.7    MCHC 31.2  30.9  31.4    RDW 18.8  18.3  18.1    Platelets 53  66  65      CMP          1/8/2025    04:50 1/9/2025    05:19 1/10/2025    04:48   CMP   Glucose 248  173  152    BUN 10  14  17    Creatinine 1.14  1.17  1.14    EGFR 74.5  72.3  74.5    Sodium 135  135  138    Potassium 3.8  4.2  4.0    Chloride 104  103  104    Calcium 8.6  8.6  8.3    Total Protein 5.4  5.5  5.4    Albumin 2.6  2.6  2.7    Globulin 2.8  2.9  2.7    Total Bilirubin 1.1  1.4  1.4    Alkaline Phosphatase 103  111  118    AST (SGOT) 44  40  38    ALT (SGPT) 22  23  20    Albumin/Globulin Ratio 0.9  0.9  1.0    BUN/Creatinine Ratio 8.8  12.0  14.9    Anion Gap 6.2  7.1  8.1      []  Microbiology  []  Radiology  []  EKG/Telemetry   []  Cardiology/Vascular   []  Pathology  []  Old records  []  Other:    Scheduled medications:  busPIRone, 7.5 mg, Oral, TID  carvedilol, 12.5 mg, Oral, Nightly  carvedilol, 25 mg, Oral, QAM  cetirizine, 10 mg, Oral, Daily  fluticasone, 2 spray, Each Nare, Daily  furosemide, 80 mg, Oral, QAM  insulin lispro, 2-7 Units,  Subcutaneous, 4x Daily AC & at Bedtime  lactulose, 30 g, Oral, TID  levETIRAcetam, 500 mg, Oral, BID  nystatin, , Topical, Q12H  rifAXIMin, 550 mg, Oral, Q12H  sertraline, 100 mg, Oral, Nightly  sodium chloride, 10 mL, Intravenous, Q12H  spironolactone, 200 mg, Oral, Daily  zinc sulfate, 220 mg, Oral, Daily      As needed medications:    aluminum-magnesium hydroxide-simethicone    senna-docusate sodium **AND** polyethylene glycol **AND** bisacodyl **AND** bisacodyl    dextrose    dextrose    glucagon (human recombinant)    hydrOXYzine    ipratropium-albuterol    nitroglycerin    ondansetron    oxyCODONE    sodium chloride    sodium chloride    sodium chloride  Infusions:          Assessment & Plan   Assessment / Plan     Assessment:    Acute metabolic encephalopathy secondary to hepatic encephalopathy  Lactic acidosis likely due to poor liver clearance  Chronic cirrhosis secondary to nonalcoholic fatty liver disease, MELD = 17  Elevated troponin likely type II MI due to demand ischemia due to above  Recent GI bleed secondary to portal hypertensive gastropathy  Chronic anemia  Chronic diastolic congestive heart failure  Anasarca  CKD stage III  History of TBI  Chronic splenomegaly  DM  Long-term nursing home resident  Pancytopenia likely secondary to cirrhosis    Plan:  Continue mobilize as tolerated.  Up in chair.  Return to long-term care facility ... Case management looking into arrangements  U/S .... no significant ascites noted   Add Zinc  Add sliding scale insulin coverage  Continue lactulose for goal of 3 BMs daily   Continue Xifaxan  Lactic acid improved  Denies chest pain  Continue twice daily PPI  Monitor hemoglobin closely  Adjust oral pain meds as needed ... Continue home dose  CBC, CMP reviewed  Repeat CBC, CMP, mag and Phos in a.m.     Reviewed patient's labs and imaging, and discussed with patient and nurse at bedside.    VTE Prophylaxis:  Mechanical VTE prophylaxis orders are present.    CODE STATUS:    Code Status (Patient has no pulse and is not breathing): CPR (Attempt to Resuscitate)  Medical Interventions (Patient has pulse or is breathing): Full Support         Patient independently seen and evaluated, agree with assessment and plan, above documentation reflects plan put forth during bedside rounds.  More than 51% of the time of this patient encounter was performed by me.     Interval history:  Patient doing better today, ultrasound yesterday with no tappable fluid collection     GEN: No acute distress  HEENT: Moist mucous membranes  LUNGS: Equal chest rise bilaterally  CARDIAC: Regular rate and rhythm  NEURO: Moving all 4 extremities spontaneously  SKIN: No obvious breakdown     Plan:  Agree with assessment and plan as above  Continue zinc  Continue Xifaxan, lactulose  Repeat ammonia level reviewed, remains elevated but patient's mentation is clear  Continue PPI  Continue current pain regimen  Out of bed to chair  PT/OT  Continue sliding-scale insulin  Clinical course will dictate further management  Bladder scan to ensure adequate emptying  CBC, CMP reviewed  Repeat CBC, CMP, mag and Phos in a.m.      Electronically signed by Carl Palma MD, 1/10/2025, 13:05 EST.

## 2025-01-10 NOTE — PLAN OF CARE
Goal Outcome Evaluation:           Progress: no change  Outcome Evaluation: VSS, c/o pain in upper back post fall prior to admission. Appears to be resting well since being transferred to the floor from PCU. No acute concerns to report this shift. Continue plan of care.

## 2025-01-10 NOTE — PLAN OF CARE
Goal Outcome Evaluation:  Plan of Care Reviewed With: patient        Progress: improving  Outcome Evaluation: Resting well. Safety maintained.

## 2025-01-11 LAB
ALBUMIN SERPL-MCNC: 2.8 G/DL (ref 3.5–5.2)
ALBUMIN/GLOB SERPL: 1 G/DL
ALP SERPL-CCNC: 124 U/L (ref 39–117)
ALT SERPL W P-5'-P-CCNC: 20 U/L (ref 1–41)
ANION GAP SERPL CALCULATED.3IONS-SCNC: 7.7 MMOL/L (ref 5–15)
AST SERPL-CCNC: 37 U/L (ref 1–40)
BASOPHILS # BLD AUTO: 0.05 10*3/MM3 (ref 0–0.2)
BASOPHILS NFR BLD AUTO: 1.1 % (ref 0–1.5)
BILIRUB SERPL-MCNC: 1.1 MG/DL (ref 0–1.2)
BUN SERPL-MCNC: 22 MG/DL (ref 6–20)
BUN/CREAT SERPL: 15.4 (ref 7–25)
CALCIUM SPEC-SCNC: 8.7 MG/DL (ref 8.6–10.5)
CHLORIDE SERPL-SCNC: 100 MMOL/L (ref 98–107)
CO2 SERPL-SCNC: 28.3 MMOL/L (ref 22–29)
CREAT SERPL-MCNC: 1.43 MG/DL (ref 0.76–1.27)
DEPRECATED RDW RBC AUTO: 60 FL (ref 37–54)
EGFRCR SERPLBLD CKD-EPI 2021: 56.8 ML/MIN/1.73
EOSINOPHIL # BLD AUTO: 0.11 10*3/MM3 (ref 0–0.4)
EOSINOPHIL NFR BLD AUTO: 2.4 % (ref 0.3–6.2)
ERYTHROCYTE [DISTWIDTH] IN BLOOD BY AUTOMATED COUNT: 18.4 % (ref 12.3–15.4)
GLOBULIN UR ELPH-MCNC: 2.8 GM/DL
GLUCOSE BLDC GLUCOMTR-MCNC: 205 MG/DL (ref 70–99)
GLUCOSE BLDC GLUCOMTR-MCNC: 230 MG/DL (ref 70–99)
GLUCOSE BLDC GLUCOMTR-MCNC: 231 MG/DL (ref 70–99)
GLUCOSE BLDC GLUCOMTR-MCNC: 242 MG/DL (ref 70–99)
GLUCOSE SERPL-MCNC: 239 MG/DL (ref 65–99)
HCT VFR BLD AUTO: 26.3 % (ref 37.5–51)
HGB BLD-MCNC: 8.3 G/DL (ref 13–17.7)
IMM GRANULOCYTES # BLD AUTO: 0.03 10*3/MM3 (ref 0–0.05)
IMM GRANULOCYTES NFR BLD AUTO: 0.7 % (ref 0–0.5)
LYMPHOCYTES # BLD AUTO: 0.77 10*3/MM3 (ref 0.7–3.1)
LYMPHOCYTES NFR BLD AUTO: 17.1 % (ref 19.6–45.3)
MAGNESIUM SERPL-MCNC: 1.5 MG/DL (ref 1.6–2.6)
MCH RBC QN AUTO: 27.9 PG (ref 26.6–33)
MCHC RBC AUTO-ENTMCNC: 31.6 G/DL (ref 31.5–35.7)
MCV RBC AUTO: 88.3 FL (ref 79–97)
MONOCYTES # BLD AUTO: 0.45 10*3/MM3 (ref 0.1–0.9)
MONOCYTES NFR BLD AUTO: 10 % (ref 5–12)
NEUTROPHILS NFR BLD AUTO: 3.1 10*3/MM3 (ref 1.7–7)
NEUTROPHILS NFR BLD AUTO: 68.7 % (ref 42.7–76)
NRBC BLD AUTO-RTO: 0 /100 WBC (ref 0–0.2)
PHOSPHATE SERPL-MCNC: 3.4 MG/DL (ref 2.5–4.5)
PLATELET # BLD AUTO: 73 10*3/MM3 (ref 140–450)
PMV BLD AUTO: 11.6 FL (ref 6–12)
POTASSIUM SERPL-SCNC: 4.3 MMOL/L (ref 3.5–5.2)
PROT SERPL-MCNC: 5.6 G/DL (ref 6–8.5)
RBC # BLD AUTO: 2.98 10*6/MM3 (ref 4.14–5.8)
SODIUM SERPL-SCNC: 136 MMOL/L (ref 136–145)
WBC NRBC COR # BLD AUTO: 4.51 10*3/MM3 (ref 3.4–10.8)

## 2025-01-11 PROCEDURE — 82948 REAGENT STRIP/BLOOD GLUCOSE: CPT

## 2025-01-11 PROCEDURE — 25010000002 MAGNESIUM SULFATE 2 GM/50ML SOLUTION: Performed by: INTERNAL MEDICINE

## 2025-01-11 PROCEDURE — 63710000001 INSULIN LISPRO (HUMAN) PER 5 UNITS: Performed by: PHYSICIAN ASSISTANT

## 2025-01-11 PROCEDURE — 80053 COMPREHEN METABOLIC PANEL: CPT | Performed by: PHYSICIAN ASSISTANT

## 2025-01-11 PROCEDURE — 84100 ASSAY OF PHOSPHORUS: CPT | Performed by: PHYSICIAN ASSISTANT

## 2025-01-11 PROCEDURE — 83735 ASSAY OF MAGNESIUM: CPT | Performed by: PHYSICIAN ASSISTANT

## 2025-01-11 PROCEDURE — 85025 COMPLETE CBC W/AUTO DIFF WBC: CPT | Performed by: PHYSICIAN ASSISTANT

## 2025-01-11 PROCEDURE — 82948 REAGENT STRIP/BLOOD GLUCOSE: CPT | Performed by: PHYSICIAN ASSISTANT

## 2025-01-11 PROCEDURE — 99232 SBSQ HOSP IP/OBS MODERATE 35: CPT | Performed by: INTERNAL MEDICINE

## 2025-01-11 RX ORDER — LACTULOSE 10 G/15ML
20 SOLUTION ORAL 2 TIMES DAILY
Status: DISCONTINUED | OUTPATIENT
Start: 2025-01-11 | End: 2025-01-14 | Stop reason: HOSPADM

## 2025-01-11 RX ORDER — MAGNESIUM SULFATE HEPTAHYDRATE 40 MG/ML
2 INJECTION, SOLUTION INTRAVENOUS ONCE
Status: COMPLETED | OUTPATIENT
Start: 2025-01-11 | End: 2025-01-11

## 2025-01-11 RX ADMIN — CARVEDILOL 25 MG: 25 TABLET, FILM COATED ORAL at 06:42

## 2025-01-11 RX ADMIN — BUSPIRONE HYDROCHLORIDE 7.5 MG: 15 TABLET ORAL at 20:35

## 2025-01-11 RX ADMIN — INSULIN LISPRO 3 UNITS: 100 INJECTION, SOLUTION INTRAVENOUS; SUBCUTANEOUS at 09:16

## 2025-01-11 RX ADMIN — LACTULOSE 30 G: 20 SOLUTION ORAL at 09:16

## 2025-01-11 RX ADMIN — INSULIN LISPRO 3 UNITS: 100 INJECTION, SOLUTION INTRAVENOUS; SUBCUTANEOUS at 20:35

## 2025-01-11 RX ADMIN — Medication 220 MG: at 09:17

## 2025-01-11 RX ADMIN — LEVETIRACETAM 500 MG: 500 TABLET, FILM COATED ORAL at 20:35

## 2025-01-11 RX ADMIN — LACTULOSE 20 G: 10 SOLUTION ORAL at 20:35

## 2025-01-11 RX ADMIN — CARVEDILOL 12.5 MG: 12.5 TABLET, FILM COATED ORAL at 20:35

## 2025-01-11 RX ADMIN — OXYCODONE 10 MG: 5 TABLET ORAL at 06:42

## 2025-01-11 RX ADMIN — BUSPIRONE HYDROCHLORIDE 7.5 MG: 15 TABLET ORAL at 09:16

## 2025-01-11 RX ADMIN — INSULIN LISPRO 3 UNITS: 100 INJECTION, SOLUTION INTRAVENOUS; SUBCUTANEOUS at 17:38

## 2025-01-11 RX ADMIN — RIFAXIMIN 550 MG: 550 TABLET ORAL at 09:17

## 2025-01-11 RX ADMIN — Medication 10 ML: at 20:35

## 2025-01-11 RX ADMIN — RIFAXIMIN 550 MG: 550 TABLET ORAL at 20:35

## 2025-01-11 RX ADMIN — NYSTATIN: 100000 POWDER TOPICAL at 17:05

## 2025-01-11 RX ADMIN — Medication 10 ML: at 09:17

## 2025-01-11 RX ADMIN — FUROSEMIDE 80 MG: 40 TABLET ORAL at 06:41

## 2025-01-11 RX ADMIN — OXYCODONE 10 MG: 5 TABLET ORAL at 00:47

## 2025-01-11 RX ADMIN — LEVETIRACETAM 500 MG: 500 TABLET, FILM COATED ORAL at 09:16

## 2025-01-11 RX ADMIN — INSULIN LISPRO 3 UNITS: 100 INJECTION, SOLUTION INTRAVENOUS; SUBCUTANEOUS at 12:36

## 2025-01-11 RX ADMIN — OXYCODONE 10 MG: 5 TABLET ORAL at 17:41

## 2025-01-11 RX ADMIN — MAGNESIUM SULFATE HEPTAHYDRATE 2 G: 2 INJECTION, SOLUTION INTRAVENOUS at 11:49

## 2025-01-11 RX ADMIN — CETIRIZINE HYDROCHLORIDE 10 MG: 10 TABLET, FILM COATED ORAL at 09:16

## 2025-01-11 RX ADMIN — NYSTATIN: 100000 POWDER TOPICAL at 20:36

## 2025-01-11 RX ADMIN — BUSPIRONE HYDROCHLORIDE 7.5 MG: 15 TABLET ORAL at 17:05

## 2025-01-11 RX ADMIN — SERTRALINE HYDROCHLORIDE 100 MG: 100 TABLET ORAL at 20:42

## 2025-01-11 NOTE — PLAN OF CARE
Goal Outcome Evaluation:  Plan of Care Reviewed With: patient        Progress: no change     No acute changes during shift. Patient awake most of the night. Multiple BM's. Patient refused evening dose of Lactulose.

## 2025-01-11 NOTE — PROGRESS NOTES
[facility]   Hospitalist Progress Note  Date: 2025  Patient Name: Nic Nicolas  : 1965  MRN: [id number]  Date of admission: 2025    Subjective   Subjective     Chief Complaint:   Altered mental status    Summary:   Nic Nicolas is a 59 y.o. male past medical history of cirrhosis secondary to NAFLD, recurrent hepatic encephalopathy admissions, hypertension, TBI, type 2 diabetes, CHF, CKD, asthma, obesity, recent GI bleed secondary to portal hypertensive gastropathy status post argon treatment who presents to the ER due to worsening confusion at his nursing home.  Patient unable to provide history due to his condition and history supplemented by discussion with the ER staff.  Appears patient is a long-term resident at [place] and was apparently doing well until tonight shift when nursing staff at their facility noted him to be significantly more confused than he had been in the preceding days and due to his history of recurrent encephalopathy admissions he was transferred to the ER for evaluation     Upon arrival patient was hemodynamically stable and lab workup revealed significant elevated ammonia of 149, mild lactic acidosis of 2.2.  Urinalysis showed some microscopic hematuria and a CMP did not show any extensive hyperbilirubinemia.  Troponin was initially elevated and downtrended.  Magnesium low 1.4.  Chest x-ray was unremarkable.  Patient was given lactulose in the ER and he did tolerate it fine but had to be fed through a syringe for him to tolerate.  Due to his significant encephalopathy hospitalist was then contacted for admission.    Interval Followup:  2025    Having difficulty time resting.  Having frequent multiple BMs from the lactulose.    Poorly tolerating current lactulose dose.  Recent Abd ultrasound did not show any significant ascites  Glucose 230s range   T. bili 1.4  Creatinine increased 1.1 ---> 1.4.  130s/60s; on room air      Objective   Objective      Vitals:   Temp:  [97.2 °F (36.2 °C)-98.1 °F (36.7 °C)] 98.1 °F (36.7 °C)  Heart Rate:  [70-77] 71  Resp:  [18] 18  BP: (102-135)/(53-74) 132/56    Physical Exam   GEN: No acute distress.  Alert and conversational.  Not fully oriented but knows he is in [facility]  HEENT: Moist mucous membranes  LUNGS: Equal chest rise bilaterally  CARDIAC: Regular rate and rhythm  NEURO: Moving all 4 extremities spontaneously  SKIN: No obvious breakdown    Result Review    Result Review:  I have personally reviewed the results as below  [x]  Laboratory CBC, CMP personally reviewed  CBC          1/9/2025    05:19 1/10/2025    04:48 1/11/2025    06:07   CBC   WBC 3.95  3.53  4.51    RBC 3.02  2.92  2.98    Hemoglobin 8.4  8.1  8.3    Hematocrit 27.2  25.8  26.3    MCV 90.1  88.4  88.3    MCH 27.8  27.7  27.9    MCHC 30.9  31.4  31.6    RDW 18.3  18.1  18.4    Platelets 66  65  73      CMP          1/9/2025    05:19 1/10/2025    04:48 1/11/2025    06:07   CMP   Glucose 173  152  239    BUN 14  17  22    Creatinine 1.17  1.14  1.43    EGFR 72.3  74.5  56.8    Sodium 135  138  136    Potassium 4.2  4.0  4.3    Chloride 103  104  100    Calcium 8.6  8.3  8.7    Total Protein 5.5  5.4  5.6    Albumin 2.6  2.7  2.8    Globulin 2.9  2.7  2.8    Total Bilirubin 1.4  1.4  1.1    Alkaline Phosphatase 111  118  124    AST (SGOT) 40  38  37    ALT (SGPT) 23  20  20    Albumin/Globulin Ratio 0.9  1.0  1.0    BUN/Creatinine Ratio 12.0  14.9  15.4    Anion Gap 7.1  8.1  7.7      []  Microbiology  []  Radiology  []  EKG/Telemetry   []  Cardiology/Vascular   []  Pathology  []  Old records  []  Other:      Assessment & Plan   Assessment / Plan     Assessment:    Acute metabolic encephalopathy secondary to hepatic encephalopathy  Lactic acidosis likely due to poor liver clearance  Chronic cirrhosis secondary to nonalcoholic fatty liver disease, MELD = 17  Elevated troponin likely type II MI due to demand ischemia due to above  Recent GI bleed  secondary to portal hypertensive gastropathy  Chronic anemia  Chronic diastolic congestive heart failure  Anasarca  CKD stage III  History of TBI  Chronic splenomegaly  DM  Long-term nursing home resident  Pancytopenia likely secondary to cirrhosis    Plan:  Reduce lactulose Rx   Hold Lasix/spironolactone.  Monitor Cr/renal indices closely  Continue mobilize as tolerated.    Case management looking into long-term care arrangements  U/S .... no significant ascites noted   Continue zinc  Continue sliding scale insulin coverage  Continue lactulose, dose adjusted  Continue Xifaxan  Continue twice daily PPI  Monitor hemoglobin closely  Adjust oral pain meds as needed ... Continue home dose  CBC, CMP reviewed  Repeat CBC, CMP, mag and Phos in a.m.     Reviewed patient's labs and imaging, and discussed with patient and nurse at bedside.    VTE Prophylaxis:  Mechanical VTE prophylaxis orders are present.    CODE STATUS:   Code Status (Patient has no pulse and is not breathing): CPR (Attempt to Resuscitate)  Medical Interventions (Patient has pulse or is breathing): Full Support           Patient independently seen and evaluated, agree with assessment and plan, above documentation reflects plan put forth during bedside rounds.  More than 51% of the time of this patient encounter was performed by me.     Interval history:  No acute events overnight, patient tearful this a.m., weak     GEN: No acute distress  HEENT: Moist mucous membranes  LUNGS: Equal chest rise bilaterally  CARDIAC: Regular rate and rhythm  NEURO: Moving all 4 extremities spontaneously  SKIN: No obvious breakdown     Plan:  Agree with assessment and plan as above  Continue zinc  Continue Xifaxan, lactulose, decrease lactulose dose  Repeat ammonia level reviewed, remains elevated but patient's mentation is clear  Continue PPI  Continue current pain regimen  Out of bed to chair  PT/OT  Continue sliding-scale insulin  Clinical course will dictate further  management  Bladder scan to ensure adequate emptying  CBC, CMP reviewed 1/11/2025   Repeat CBC, CMP, mag and Phos in a.m. 1/11/2025       Electronically signed by Carl Palma MD, 1/11/2025, 13:36 EST.

## 2025-01-12 LAB
ALBUMIN SERPL-MCNC: 2.8 G/DL (ref 3.5–5.2)
ALBUMIN/GLOB SERPL: 1 G/DL
ALP SERPL-CCNC: 121 U/L (ref 39–117)
ALT SERPL W P-5'-P-CCNC: 19 U/L (ref 1–41)
ANION GAP SERPL CALCULATED.3IONS-SCNC: 6.6 MMOL/L (ref 5–15)
AST SERPL-CCNC: 37 U/L (ref 1–40)
BASOPHILS # BLD AUTO: 0.04 10*3/MM3 (ref 0–0.2)
BASOPHILS NFR BLD AUTO: 0.9 % (ref 0–1.5)
BILIRUB SERPL-MCNC: 1.5 MG/DL (ref 0–1.2)
BUN SERPL-MCNC: 26 MG/DL (ref 6–20)
BUN/CREAT SERPL: 22.6 (ref 7–25)
CALCIUM SPEC-SCNC: 8.8 MG/DL (ref 8.6–10.5)
CHLORIDE SERPL-SCNC: 102 MMOL/L (ref 98–107)
CO2 SERPL-SCNC: 28.4 MMOL/L (ref 22–29)
CREAT SERPL-MCNC: 1.15 MG/DL (ref 0.76–1.27)
DEPRECATED RDW RBC AUTO: 59.4 FL (ref 37–54)
EGFRCR SERPLBLD CKD-EPI 2021: 73.8 ML/MIN/1.73
EOSINOPHIL # BLD AUTO: 0.11 10*3/MM3 (ref 0–0.4)
EOSINOPHIL NFR BLD AUTO: 2.5 % (ref 0.3–6.2)
ERYTHROCYTE [DISTWIDTH] IN BLOOD BY AUTOMATED COUNT: 18.5 % (ref 12.3–15.4)
GLOBULIN UR ELPH-MCNC: 2.9 GM/DL
GLUCOSE BLDC GLUCOMTR-MCNC: 118 MG/DL (ref 70–99)
GLUCOSE BLDC GLUCOMTR-MCNC: 170 MG/DL (ref 70–99)
GLUCOSE BLDC GLUCOMTR-MCNC: 210 MG/DL (ref 70–99)
GLUCOSE BLDC GLUCOMTR-MCNC: 211 MG/DL (ref 70–99)
GLUCOSE SERPL-MCNC: 133 MG/DL (ref 65–99)
HCT VFR BLD AUTO: 26.3 % (ref 37.5–51)
HGB BLD-MCNC: 8.3 G/DL (ref 13–17.7)
IMM GRANULOCYTES # BLD AUTO: 0.02 10*3/MM3 (ref 0–0.05)
IMM GRANULOCYTES NFR BLD AUTO: 0.5 % (ref 0–0.5)
LYMPHOCYTES # BLD AUTO: 0.87 10*3/MM3 (ref 0.7–3.1)
LYMPHOCYTES NFR BLD AUTO: 19.6 % (ref 19.6–45.3)
MAGNESIUM SERPL-MCNC: 1.5 MG/DL (ref 1.6–2.6)
MCH RBC QN AUTO: 27.8 PG (ref 26.6–33)
MCHC RBC AUTO-ENTMCNC: 31.6 G/DL (ref 31.5–35.7)
MCV RBC AUTO: 88 FL (ref 79–97)
MONOCYTES # BLD AUTO: 0.36 10*3/MM3 (ref 0.1–0.9)
MONOCYTES NFR BLD AUTO: 8.1 % (ref 5–12)
NEUTROPHILS NFR BLD AUTO: 3.04 10*3/MM3 (ref 1.7–7)
NEUTROPHILS NFR BLD AUTO: 68.4 % (ref 42.7–76)
NRBC BLD AUTO-RTO: 0 /100 WBC (ref 0–0.2)
PHOSPHATE SERPL-MCNC: 3.2 MG/DL (ref 2.5–4.5)
PLATELET # BLD AUTO: 71 10*3/MM3 (ref 140–450)
PMV BLD AUTO: 10.7 FL (ref 6–12)
POTASSIUM SERPL-SCNC: 4 MMOL/L (ref 3.5–5.2)
PROT SERPL-MCNC: 5.7 G/DL (ref 6–8.5)
RBC # BLD AUTO: 2.99 10*6/MM3 (ref 4.14–5.8)
SODIUM SERPL-SCNC: 137 MMOL/L (ref 136–145)
WBC NRBC COR # BLD AUTO: 4.44 10*3/MM3 (ref 3.4–10.8)

## 2025-01-12 PROCEDURE — 84100 ASSAY OF PHOSPHORUS: CPT | Performed by: PHYSICIAN ASSISTANT

## 2025-01-12 PROCEDURE — 82948 REAGENT STRIP/BLOOD GLUCOSE: CPT

## 2025-01-12 PROCEDURE — 83735 ASSAY OF MAGNESIUM: CPT | Performed by: PHYSICIAN ASSISTANT

## 2025-01-12 PROCEDURE — 25010000002 MAGNESIUM SULFATE IN D5W 1G/100ML (PREMIX) 1-5 GM/100ML-% SOLUTION: Performed by: PHYSICIAN ASSISTANT

## 2025-01-12 PROCEDURE — 80053 COMPREHEN METABOLIC PANEL: CPT | Performed by: PHYSICIAN ASSISTANT

## 2025-01-12 PROCEDURE — 99232 SBSQ HOSP IP/OBS MODERATE 35: CPT | Performed by: INTERNAL MEDICINE

## 2025-01-12 PROCEDURE — 63710000001 INSULIN LISPRO (HUMAN) PER 5 UNITS: Performed by: PHYSICIAN ASSISTANT

## 2025-01-12 PROCEDURE — 85025 COMPLETE CBC W/AUTO DIFF WBC: CPT | Performed by: PHYSICIAN ASSISTANT

## 2025-01-12 RX ORDER — MAGNESIUM SULFATE 1 G/100ML
1 INJECTION INTRAVENOUS
Status: COMPLETED | OUTPATIENT
Start: 2025-01-12 | End: 2025-01-12

## 2025-01-12 RX ORDER — MAGNESIUM SULFATE 1 G/100ML
1 INJECTION INTRAVENOUS
Status: DISPENSED | OUTPATIENT
Start: 2025-01-12 | End: 2025-01-12

## 2025-01-12 RX ADMIN — BUSPIRONE HYDROCHLORIDE 7.5 MG: 15 TABLET ORAL at 21:58

## 2025-01-12 RX ADMIN — Medication 10 ML: at 08:46

## 2025-01-12 RX ADMIN — Medication 220 MG: at 08:46

## 2025-01-12 RX ADMIN — LACTULOSE 20 G: 10 SOLUTION ORAL at 08:45

## 2025-01-12 RX ADMIN — OXYCODONE 10 MG: 5 TABLET ORAL at 05:14

## 2025-01-12 RX ADMIN — LACTULOSE 20 G: 10 SOLUTION ORAL at 21:59

## 2025-01-12 RX ADMIN — INSULIN LISPRO 3 UNITS: 100 INJECTION, SOLUTION INTRAVENOUS; SUBCUTANEOUS at 17:24

## 2025-01-12 RX ADMIN — CARVEDILOL 25 MG: 25 TABLET, FILM COATED ORAL at 08:45

## 2025-01-12 RX ADMIN — FLUTICASONE PROPIONATE 2 SPRAY: 50 SPRAY, METERED NASAL at 11:13

## 2025-01-12 RX ADMIN — NYSTATIN: 100000 POWDER TOPICAL at 11:13

## 2025-01-12 RX ADMIN — CETIRIZINE HYDROCHLORIDE 10 MG: 10 TABLET, FILM COATED ORAL at 08:45

## 2025-01-12 RX ADMIN — Medication 10 ML: at 21:59

## 2025-01-12 RX ADMIN — INSULIN LISPRO 2 UNITS: 100 INJECTION, SOLUTION INTRAVENOUS; SUBCUTANEOUS at 21:59

## 2025-01-12 RX ADMIN — MAGNESIUM SULFATE HEPTAHYDRATE 1 G: 1 INJECTION, SOLUTION INTRAVENOUS at 15:30

## 2025-01-12 RX ADMIN — RIFAXIMIN 550 MG: 550 TABLET ORAL at 21:58

## 2025-01-12 RX ADMIN — INSULIN LISPRO 3 UNITS: 100 INJECTION, SOLUTION INTRAVENOUS; SUBCUTANEOUS at 12:33

## 2025-01-12 RX ADMIN — MAGNESIUM SULFATE 1 G: 1 INJECTION INTRAVENOUS at 18:08

## 2025-01-12 RX ADMIN — OXYCODONE 10 MG: 5 TABLET ORAL at 21:58

## 2025-01-12 RX ADMIN — LEVETIRACETAM 500 MG: 500 TABLET, FILM COATED ORAL at 21:58

## 2025-01-12 RX ADMIN — CARVEDILOL 12.5 MG: 12.5 TABLET, FILM COATED ORAL at 21:58

## 2025-01-12 RX ADMIN — RIFAXIMIN 550 MG: 550 TABLET ORAL at 08:46

## 2025-01-12 RX ADMIN — SERTRALINE HYDROCHLORIDE 100 MG: 100 TABLET ORAL at 21:59

## 2025-01-12 RX ADMIN — BUSPIRONE HYDROCHLORIDE 7.5 MG: 15 TABLET ORAL at 15:31

## 2025-01-12 RX ADMIN — LEVETIRACETAM 500 MG: 500 TABLET, FILM COATED ORAL at 08:45

## 2025-01-12 RX ADMIN — MAGNESIUM SULFATE 1 G: 1 INJECTION INTRAVENOUS at 22:03

## 2025-01-12 RX ADMIN — BUSPIRONE HYDROCHLORIDE 7.5 MG: 15 TABLET ORAL at 08:45

## 2025-01-12 RX ADMIN — OXYCODONE 10 MG: 5 TABLET ORAL at 16:06

## 2025-01-12 RX ADMIN — NYSTATIN: 100000 POWDER TOPICAL at 21:59

## 2025-01-12 NOTE — PLAN OF CARE
Goal Outcome Evaluation:  Plan of Care Reviewed With: patient        Progress: no change  Outcome Evaluation: Patient AOx4 today. C/O pain in upper back, oxycodone given. Impulsive at times but remains a standby to the bathroom. Seems to have rested well throughout shift. No acute concerns to report this shift. Continue plan of care.

## 2025-01-12 NOTE — PROGRESS NOTES
[facility]   Hospitalist Progress Note  Date: 2025  Patient Name: Nic Nicolas  : 1965  MRN: [id number]  Date of admission: 2025    Subjective   Subjective     Chief Complaint:   Altered mental status    Summary:   Nic Nicolas is a 59 y.o. male past medical history of cirrhosis secondary to NAFLD, recurrent hepatic encephalopathy admissions, hypertension, TBI, type 2 diabetes, CHF, CKD, asthma, obesity, recent GI bleed secondary to portal hypertensive gastropathy status post argon treatment who presents to the ER due to worsening confusion at his nursing home.  Patient unable to provide history due to his condition and history supplemented by discussion with the ER staff.  Appears patient is a long-term resident at [facility] and was apparently doing well until tonight shift when nursing staff at their facility noted him to be significantly more confused than he had been in the preceding days and due to his history of recurrent encephalopathy admissions he was transferred to the ER for evaluation     Upon arrival patient was hemodynamically stable and lab workup revealed significant elevated ammonia of 149, mild lactic acidosis of 2.2.  Urinalysis showed some microscopic hematuria and a CMP did not show any extensive hyperbilirubinemia.  Troponin was initially elevated and downtrended.  Magnesium low 1.4.  Chest x-ray was unremarkable.  Patient was given lactulose in the ER and he did tolerate it fine but had to be fed through a syringe for him to tolerate.  Due to his significant encephalopathy hospitalist was then contacted for admission.    Interval Followup:  2025    Having a better day today.    Lactulose adjusted BID  Glucose 118-230 range   Appetite improved, eating better.  On room air  Normotensive  Hgb stable 8-8.5 range  Creatinine better at 1.1  Disposition: Likely return to [facility] 24-48 hours if continues to improve      Objective   Objective     Vitals:    Temp:  [97.9 °F (36.6 °C)-98.2 °F (36.8 °C)] 98.1 °F (36.7 °C)  Heart Rate:  [71-83] 72  Resp:  [16-20] 18  BP: (119-161)/(48-90) 123/64    Physical Exam   GEN: No acute distress.  Alert and conversational.  Not fully oriented but knows he is in [facility]  HEENT: Moist mucous membranes  LUNGS: Equal chest rise bilaterally  CARDIAC: Regular rate and rhythm  NEURO: Moving all 4 extremities spontaneously  SKIN: No obvious breakdown    Result Review    Result Review:  I have personally reviewed the results as below  [x]  Laboratory CBC, CMP personally reviewed  CBC          1/10/2025    04:48 1/11/2025    06:07 1/12/2025    06:23   CBC   WBC 3.53  4.51  4.44    RBC 2.92  2.98  2.99    Hemoglobin 8.1  8.3  8.3    Hematocrit 25.8  26.3  26.3    MCV 88.4  88.3  88.0    MCH 27.7  27.9  27.8    MCHC 31.4  31.6  31.6    RDW 18.1  18.4  18.5    Platelets 65  73  71      CMP          1/10/2025    04:48 1/11/2025    06:07 1/12/2025    06:23   CMP   Glucose 152  239  133    BUN 17  22  26    Creatinine 1.14  1.43  1.15    EGFR 74.5  56.8  73.8    Sodium 138  136  137    Potassium 4.0  4.3  4.0    Chloride 104  100  102    Calcium 8.3  8.7  8.8    Total Protein 5.4  5.6  5.7    Albumin 2.7  2.8  2.8    Globulin 2.7  2.8  2.9    Total Bilirubin 1.4  1.1  1.5    Alkaline Phosphatase 118  124  121    AST (SGOT) 38  37  37    ALT (SGPT) 20  20  19    Albumin/Globulin Ratio 1.0  1.0  1.0    BUN/Creatinine Ratio 14.9  15.4  22.6    Anion Gap 8.1  7.7  6.6      []  Microbiology  []  Radiology  []  EKG/Telemetry   []  Cardiology/Vascular   []  Pathology  []  Old records  []  Other:      Assessment & Plan   Assessment / Plan     Assessment:    Acute metabolic encephalopathy secondary to hepatic encephalopathy  Lactic acidosis likely due to poor liver clearance  Chronic cirrhosis secondary to nonalcoholic fatty liver disease, MELD = 17  Elevated troponin likely type II MI due to demand ischemia due to above  Recent GI bleed  secondary to portal hypertensive gastropathy  Chronic anemia  Chronic diastolic congestive heart failure  Anasarca  CKD stage III  History of TBI  Chronic splenomegaly  DM  Long-term nursing home resident  Pancytopenia likely secondary to cirrhosis    Plan:  Disposition: Likely return to [facility] 24 hours  Adjust lactulose Rx   Continue hold Lasix/spironolactone.  Monitor Cr/renal indices closely  Continue mobilize as tolerated.    U/S .... no significant ascites noted   Continue zinc  Continue sliding scale insulin coverage  Continue lactulose, dose adjusted  Continue Xifaxan  Continue twice daily PPI  Monitor hemoglobin closely  Adjust oral pain meds as needed ... Continue home dose  CBC, CMP reviewed  Repeat CBC, CMP, mag and Phos in a.m.     Reviewed patient's labs and imaging, and discussed with patient and nurse at bedside.    VTE Prophylaxis:  Mechanical VTE prophylaxis orders are present.    CODE STATUS:   Code Status (Patient has no pulse and is not breathing): CPR (Attempt to Resuscitate)  Medical Interventions (Patient has pulse or is breathing): Full Support         Patient independently seen and evaluated, agree with assessment and plan, above documentation reflects plan put forth during bedside rounds.  More than 51% of the time of this patient encounter was performed by me.     Interval history:  No acute events overnight, patient feeling better this a.m.     GEN: No acute distress  HEENT: Moist mucous membranes  LUNGS: Equal chest rise bilaterally  CARDIAC: Regular rate and rhythm  NEURO: Moving all 4 extremities spontaneously  SKIN: No obvious breakdown     Plan:  Agree with assessment and plan as above  Continue zinc  Continue Xifaxan, lactulose, decrease lactulose dose  Continue to intermittently monitor ammonia levels  Continue PPI  Continue current pain regimen  Out of bed to chair, patient needs to ambulate  PT/OT  Continue sliding-scale insulin  Clinical course will dictate further  management  Bladder scan to ensure adequate emptying  Continue to hold diuretics today  CBC, CMP reviewed 1/12/2025   Repeat CBC, CMP, mag and Phos in a.m. 1/12/2025       Electronically signed by Carl Palma MD, 1/12/2025, 14:52 EST.

## 2025-01-12 NOTE — PLAN OF CARE
Goal Outcome Evaluation:           Progress: improving  Outcome Evaluation: Patient remains AOx4 but intermittent forgetfulness noted, impulsive behaviors continues with ambulation without assistance, patient continues reporting back pain treated with PRN oxycodone this shift, patient rested well throughout the night, continue poc.

## 2025-01-13 LAB
ALBUMIN SERPL-MCNC: 2.6 G/DL (ref 3.5–5.2)
ALBUMIN/GLOB SERPL: 0.9 G/DL
ALP SERPL-CCNC: 119 U/L (ref 39–117)
ALT SERPL W P-5'-P-CCNC: 17 U/L (ref 1–41)
ANION GAP SERPL CALCULATED.3IONS-SCNC: 5.5 MMOL/L (ref 5–15)
AST SERPL-CCNC: 35 U/L (ref 1–40)
BASOPHILS # BLD AUTO: 0.03 10*3/MM3 (ref 0–0.2)
BASOPHILS NFR BLD AUTO: 0.7 % (ref 0–1.5)
BILIRUB SERPL-MCNC: 1.3 MG/DL (ref 0–1.2)
BUN SERPL-MCNC: 28 MG/DL (ref 6–20)
BUN/CREAT SERPL: 25.5 (ref 7–25)
CALCIUM SPEC-SCNC: 8.2 MG/DL (ref 8.6–10.5)
CHLORIDE SERPL-SCNC: 102 MMOL/L (ref 98–107)
CO2 SERPL-SCNC: 27.5 MMOL/L (ref 22–29)
CREAT SERPL-MCNC: 1.1 MG/DL (ref 0.76–1.27)
DEPRECATED RDW RBC AUTO: 60.7 FL (ref 37–54)
EGFRCR SERPLBLD CKD-EPI 2021: 77.8 ML/MIN/1.73
EOSINOPHIL # BLD AUTO: 0.1 10*3/MM3 (ref 0–0.4)
EOSINOPHIL NFR BLD AUTO: 2.3 % (ref 0.3–6.2)
ERYTHROCYTE [DISTWIDTH] IN BLOOD BY AUTOMATED COUNT: 18.4 % (ref 12.3–15.4)
GLOBULIN UR ELPH-MCNC: 2.8 GM/DL
GLUCOSE BLDC GLUCOMTR-MCNC: 149 MG/DL (ref 70–99)
GLUCOSE BLDC GLUCOMTR-MCNC: 167 MG/DL (ref 70–99)
GLUCOSE BLDC GLUCOMTR-MCNC: 180 MG/DL (ref 70–99)
GLUCOSE BLDC GLUCOMTR-MCNC: 188 MG/DL (ref 70–99)
GLUCOSE SERPL-MCNC: 172 MG/DL (ref 65–99)
HCT VFR BLD AUTO: 26.2 % (ref 37.5–51)
HGB BLD-MCNC: 8.1 G/DL (ref 13–17.7)
IMM GRANULOCYTES # BLD AUTO: 0.03 10*3/MM3 (ref 0–0.05)
IMM GRANULOCYTES NFR BLD AUTO: 0.7 % (ref 0–0.5)
LYMPHOCYTES # BLD AUTO: 0.88 10*3/MM3 (ref 0.7–3.1)
LYMPHOCYTES NFR BLD AUTO: 20.1 % (ref 19.6–45.3)
MAGNESIUM SERPL-MCNC: 2 MG/DL (ref 1.6–2.6)
MCH RBC QN AUTO: 27.6 PG (ref 26.6–33)
MCHC RBC AUTO-ENTMCNC: 30.9 G/DL (ref 31.5–35.7)
MCV RBC AUTO: 89.4 FL (ref 79–97)
MONOCYTES # BLD AUTO: 0.35 10*3/MM3 (ref 0.1–0.9)
MONOCYTES NFR BLD AUTO: 8 % (ref 5–12)
NEUTROPHILS NFR BLD AUTO: 2.99 10*3/MM3 (ref 1.7–7)
NEUTROPHILS NFR BLD AUTO: 68.2 % (ref 42.7–76)
NRBC BLD AUTO-RTO: 0 /100 WBC (ref 0–0.2)
PHOSPHATE SERPL-MCNC: 3 MG/DL (ref 2.5–4.5)
PLATELET # BLD AUTO: 71 10*3/MM3 (ref 140–450)
PMV BLD AUTO: 11.3 FL (ref 6–12)
POTASSIUM SERPL-SCNC: 4.2 MMOL/L (ref 3.5–5.2)
PROT SERPL-MCNC: 5.4 G/DL (ref 6–8.5)
RBC # BLD AUTO: 2.93 10*6/MM3 (ref 4.14–5.8)
SODIUM SERPL-SCNC: 135 MMOL/L (ref 136–145)
WBC NRBC COR # BLD AUTO: 4.38 10*3/MM3 (ref 3.4–10.8)

## 2025-01-13 PROCEDURE — 84100 ASSAY OF PHOSPHORUS: CPT | Performed by: PHYSICIAN ASSISTANT

## 2025-01-13 PROCEDURE — 99232 SBSQ HOSP IP/OBS MODERATE 35: CPT | Performed by: INTERNAL MEDICINE

## 2025-01-13 PROCEDURE — 85025 COMPLETE CBC W/AUTO DIFF WBC: CPT | Performed by: PHYSICIAN ASSISTANT

## 2025-01-13 PROCEDURE — 83735 ASSAY OF MAGNESIUM: CPT | Performed by: PHYSICIAN ASSISTANT

## 2025-01-13 PROCEDURE — 82948 REAGENT STRIP/BLOOD GLUCOSE: CPT | Performed by: PHYSICIAN ASSISTANT

## 2025-01-13 PROCEDURE — 63710000001 INSULIN LISPRO (HUMAN) PER 5 UNITS: Performed by: PHYSICIAN ASSISTANT

## 2025-01-13 PROCEDURE — 82948 REAGENT STRIP/BLOOD GLUCOSE: CPT

## 2025-01-13 PROCEDURE — 80053 COMPREHEN METABOLIC PANEL: CPT | Performed by: PHYSICIAN ASSISTANT

## 2025-01-13 RX ADMIN — Medication 220 MG: at 08:54

## 2025-01-13 RX ADMIN — OXYCODONE 10 MG: 5 TABLET ORAL at 12:27

## 2025-01-13 RX ADMIN — Medication 10 ML: at 08:55

## 2025-01-13 RX ADMIN — CARVEDILOL 12.5 MG: 12.5 TABLET, FILM COATED ORAL at 20:16

## 2025-01-13 RX ADMIN — LACTULOSE 20 G: 10 SOLUTION ORAL at 08:54

## 2025-01-13 RX ADMIN — BUSPIRONE HYDROCHLORIDE 7.5 MG: 15 TABLET ORAL at 20:17

## 2025-01-13 RX ADMIN — LEVETIRACETAM 500 MG: 500 TABLET, FILM COATED ORAL at 20:17

## 2025-01-13 RX ADMIN — Medication 10 ML: at 20:17

## 2025-01-13 RX ADMIN — INSULIN LISPRO 2 UNITS: 100 INJECTION, SOLUTION INTRAVENOUS; SUBCUTANEOUS at 20:16

## 2025-01-13 RX ADMIN — NYSTATIN: 100000 POWDER TOPICAL at 08:55

## 2025-01-13 RX ADMIN — BUSPIRONE HYDROCHLORIDE 7.5 MG: 15 TABLET ORAL at 08:54

## 2025-01-13 RX ADMIN — INSULIN LISPRO 2 UNITS: 100 INJECTION, SOLUTION INTRAVENOUS; SUBCUTANEOUS at 12:27

## 2025-01-13 RX ADMIN — OXYCODONE 10 MG: 5 TABLET ORAL at 06:35

## 2025-01-13 RX ADMIN — SERTRALINE HYDROCHLORIDE 100 MG: 100 TABLET ORAL at 20:17

## 2025-01-13 RX ADMIN — LACTULOSE 20 G: 10 SOLUTION ORAL at 20:16

## 2025-01-13 RX ADMIN — OXYCODONE 10 MG: 5 TABLET ORAL at 20:17

## 2025-01-13 RX ADMIN — RIFAXIMIN 550 MG: 550 TABLET ORAL at 20:17

## 2025-01-13 RX ADMIN — INSULIN LISPRO 2 UNITS: 100 INJECTION, SOLUTION INTRAVENOUS; SUBCUTANEOUS at 17:41

## 2025-01-13 RX ADMIN — BUSPIRONE HYDROCHLORIDE 7.5 MG: 15 TABLET ORAL at 15:49

## 2025-01-13 RX ADMIN — HYDROXYZINE HYDROCHLORIDE 25 MG: 25 TABLET ORAL at 15:49

## 2025-01-13 RX ADMIN — CETIRIZINE HYDROCHLORIDE 10 MG: 10 TABLET, FILM COATED ORAL at 08:54

## 2025-01-13 RX ADMIN — FLUTICASONE PROPIONATE 2 SPRAY: 50 SPRAY, METERED NASAL at 08:55

## 2025-01-13 RX ADMIN — RIFAXIMIN 550 MG: 550 TABLET ORAL at 08:54

## 2025-01-13 RX ADMIN — LEVETIRACETAM 500 MG: 500 TABLET, FILM COATED ORAL at 08:54

## 2025-01-13 NOTE — PROGRESS NOTES
[facility]   Hospitalist Progress Note  Date: 2025  Patient Name: Nic Nicolas  : 1965  MRN: [id number]  Date of admission: 2025    Subjective   Subjective     Chief Complaint:   Altered mental status    Summary:   Nic Nicolas is a 59 y.o. male past medical history of cirrhosis secondary to NAFLD, recurrent hepatic encephalopathy admissions, hypertension, TBI, type 2 diabetes, CHF, CKD, asthma, obesity, recent GI bleed secondary to portal hypertensive gastropathy status post argon treatment who presents to the ER due to worsening confusion at his nursing home.  Patient unable to provide history due to his condition and history supplemented by discussion with the ER staff.  Appears patient is a long-term resident at [facility] and was apparently doing well until tonight shift when nursing staff at their facility noted him to be significantly more confused than he had been in the preceding days and due to his history of recurrent encephalopathy admissions he was transferred to the ER for evaluation     Upon arrival patient was hemodynamically stable and lab workup revealed significant elevated ammonia of 149, mild lactic acidosis of 2.2.  Urinalysis showed some microscopic hematuria and a CMP did not show any extensive hyperbilirubinemia.  Troponin was initially elevated and downtrended.  Magnesium low 1.4.  Chest x-ray was unremarkable.  Patient was given lactulose in the ER and he did tolerate it fine but had to be fed through a syringe for him to tolerate.  Due to his significant encephalopathy hospitalist was then contacted for admission.    Interval Followup:  2025    Starting to feel better clinically.  Lactulose down titrated.  On room air  Appetite improved, eating better.  Normotensive  Hgb stable 8-8.5 range  Platelets chronically low but stable at 71K   Creatinine proved and stable at 1.1  Disposition: Will return to [facility] on Tuesday       Objective    Objective     Vitals:   Temp:  [97.7 °F (36.5 °C)-98.1 °F (36.7 °C)] 97.7 °F (36.5 °C)  Heart Rate:  [73-80] 76  Resp:  [16-20] 20  BP: (117-139)/(40-68) 124/56    Physical Exam   GEN: No acute distress.  Alert and conversational.  Not fully oriented but knows he is in [facility]  HEENT: Moist mucous membranes  LUNGS: Equal chest rise bilaterally  CARDIAC: Regular rate and rhythm  NEURO: Moving all 4 extremities spontaneously  SKIN: No obvious breakdown    Result Review    Result Review:  I have personally reviewed the results as below  [x]  Laboratory CBC, CMP personally reviewed  CBC          1/11/2025    06:07 1/12/2025    06:23 1/13/2025    05:51   CBC   WBC 4.51  4.44  4.38    RBC 2.98  2.99  2.93    Hemoglobin 8.3  8.3  8.1    Hematocrit 26.3  26.3  26.2    MCV 88.3  88.0  89.4    MCH 27.9  27.8  27.6    MCHC 31.6  31.6  30.9    RDW 18.4  18.5  18.4    Platelets 73  71  71      CMP          1/11/2025    06:07 1/12/2025    06:23 1/13/2025    05:51   CMP   Glucose 239  133  172    BUN 22  26  28    Creatinine 1.43  1.15  1.10    EGFR 56.8  73.8  77.8    Sodium 136  137  135    Potassium 4.3  4.0  4.2    Chloride 100  102  102    Calcium 8.7  8.8  8.2    Total Protein 5.6  5.7  5.4    Albumin 2.8  2.8  2.6    Globulin 2.8  2.9  2.8    Total Bilirubin 1.1  1.5  1.3    Alkaline Phosphatase 124  121  119    AST (SGOT) 37  37  35    ALT (SGPT) 20  19  17    Albumin/Globulin Ratio 1.0  1.0  0.9    BUN/Creatinine Ratio 15.4  22.6  25.5    Anion Gap 7.7  6.6  5.5      []  Microbiology  []  Radiology  []  EKG/Telemetry   []  Cardiology/Vascular   []  Pathology  []  Old records  []  Other:      Assessment & Plan   Assessment / Plan     Assessment:    Acute metabolic encephalopathy secondary to hepatic encephalopathy  Lactic acidosis likely due to poor liver clearance  Chronic cirrhosis secondary to nonalcoholic fatty liver disease, MELD = 17  Elevated troponin likely type II MI due to demand ischemia due to  above  Recent GI bleed secondary to portal hypertensive gastropathy  Chronic anemia  Chronic diastolic congestive heart failure  Anasarca  CKD stage III  History of TBI  Chronic splenomegaly  DM  Long-term nursing home resident  Pancytopenia likely secondary to cirrhosis    Plan:  Disposition: Discussed disposition.  He will return to [facility] on Tuesday  Adjust lactulose Rx   Continue hold Lasix/spironolactone.  Monitor Cr/renal indices closely  Continue mobilize as tolerated.    U/S .... no significant ascites noted   Continue zinc  Continue sliding scale insulin coverage  Continue lactulose, dose adjusted  Continue Xifaxan  Continue twice daily PPI  Monitor hemoglobin closely  Adjust oral pain meds as needed ... Continue home dose  CBC, CMP reviewed  Repeat CBC, CMP, mag and Phos in a.m.     Reviewed patient's labs and imaging, and discussed with patient and nurse at bedside.    VTE Prophylaxis:  Mechanical VTE prophylaxis orders are present.    CODE STATUS:   Code Status (Patient has no pulse and is not breathing): CPR (Attempt to Resuscitate)  Medical Interventions (Patient has pulse or is breathing): Full Support         Patient independently seen and evaluated, agree with assessment and plan, above documentation reflects plan put forth during bedside rounds.  More than 51% of the time of this patient encounter was performed by me.    Interval history:  No acute events overnight, patient resting comfortably in bed, states he is feeling better    GEN: No acute distress  HEENT: Moist mucous membranes  LUNGS: Equal chest rise bilaterally  CARDIAC: Regular rate and rhythm  NEURO: Moving all 4 extremities spontaneously  SKIN: No obvious breakdown    Plan:  Agree with assessment and plan as above  Continue Xifaxan, lactulose, zinc  Renal function normal, volume status stable  Plan to resume Lasix and Aldactone with reduced dose tomorrow  PT/OT  Out of bed to chair  Clinical course will dictate further  management      Electronically signed by Carl Palma MD, 1/13/2025, 14:38 EST.

## 2025-01-13 NOTE — PLAN OF CARE
Goal Outcome Evaluation:  Plan of Care Reviewed With: patient      Pt vss. Pt received PRN oxycodone once this shift for pain. Sliding scale insulin required at meal times. Plan of discharge back to [facility] tomorrow.

## 2025-01-13 NOTE — PLAN OF CARE
Goal Outcome Evaluation:  Plan of Care Reviewed With: patient        Progress: improving  Outcome Evaluation: Patient alert and oriented, intermittent forgetfulness continues, vss, oxycodone given this shift to treat reported back pain, continue poc.

## 2025-01-14 VITALS
RESPIRATION RATE: 18 BRPM | HEIGHT: 68 IN | OXYGEN SATURATION: 96 % | SYSTOLIC BLOOD PRESSURE: 116 MMHG | BODY MASS INDEX: 37.56 KG/M2 | WEIGHT: 247.8 LBS | TEMPERATURE: 97.9 F | HEART RATE: 75 BPM | DIASTOLIC BLOOD PRESSURE: 56 MMHG

## 2025-01-14 LAB
GLUCOSE BLDC GLUCOMTR-MCNC: 112 MG/DL (ref 70–99)
GLUCOSE BLDC GLUCOMTR-MCNC: 160 MG/DL (ref 70–99)

## 2025-01-14 PROCEDURE — 63710000001 INSULIN LISPRO (HUMAN) PER 5 UNITS: Performed by: PHYSICIAN ASSISTANT

## 2025-01-14 PROCEDURE — 82948 REAGENT STRIP/BLOOD GLUCOSE: CPT

## 2025-01-14 PROCEDURE — 99239 HOSP IP/OBS DSCHRG MGMT >30: CPT | Performed by: INTERNAL MEDICINE

## 2025-01-14 PROCEDURE — 82948 REAGENT STRIP/BLOOD GLUCOSE: CPT | Performed by: PHYSICIAN ASSISTANT

## 2025-01-14 RX ORDER — PANTOPRAZOLE SODIUM 40 MG/1
40 TABLET, DELAYED RELEASE ORAL 2 TIMES DAILY
Qty: 60 TABLET | Refills: 0 | Status: SHIPPED | OUTPATIENT
Start: 2025-01-14 | End: 2025-02-13

## 2025-01-14 RX ORDER — OXYCODONE HYDROCHLORIDE 10 MG/1
10 TABLET ORAL EVERY 8 HOURS PRN
Qty: 6 EACH | Refills: 0 | Status: SHIPPED | OUTPATIENT
Start: 2025-01-14 | End: 2025-01-17

## 2025-01-14 RX ORDER — FUROSEMIDE 20 MG/1
40 TABLET ORAL DAILY
Qty: 60 TABLET | Refills: 0 | Status: SHIPPED | OUTPATIENT
Start: 2025-01-14 | End: 2025-02-13

## 2025-01-14 RX ORDER — LACTULOSE 10 G/15ML
30 SOLUTION ORAL 4 TIMES DAILY
Qty: 5400 ML | Refills: 0 | Status: SHIPPED | OUTPATIENT
Start: 2025-01-14 | End: 2025-02-13

## 2025-01-14 RX ADMIN — Medication 220 MG: at 08:02

## 2025-01-14 RX ADMIN — LEVETIRACETAM 500 MG: 500 TABLET, FILM COATED ORAL at 08:02

## 2025-01-14 RX ADMIN — BUSPIRONE HYDROCHLORIDE 7.5 MG: 15 TABLET ORAL at 08:03

## 2025-01-14 RX ADMIN — OXYCODONE 10 MG: 5 TABLET ORAL at 08:02

## 2025-01-14 RX ADMIN — CETIRIZINE HYDROCHLORIDE 10 MG: 10 TABLET, FILM COATED ORAL at 08:03

## 2025-01-14 RX ADMIN — BUSPIRONE HYDROCHLORIDE 7.5 MG: 15 TABLET ORAL at 15:06

## 2025-01-14 RX ADMIN — LACTULOSE 20 G: 10 SOLUTION ORAL at 08:02

## 2025-01-14 RX ADMIN — FLUTICASONE PROPIONATE 2 SPRAY: 50 SPRAY, METERED NASAL at 08:06

## 2025-01-14 RX ADMIN — RIFAXIMIN 550 MG: 550 TABLET ORAL at 08:02

## 2025-01-14 RX ADMIN — NYSTATIN: 100000 POWDER TOPICAL at 08:03

## 2025-01-14 RX ADMIN — INSULIN LISPRO 2 UNITS: 100 INJECTION, SOLUTION INTRAVENOUS; SUBCUTANEOUS at 12:34

## 2025-01-14 RX ADMIN — OXYCODONE 10 MG: 5 TABLET ORAL at 14:53

## 2025-01-14 NOTE — DISCHARGE SUMMARY
[facility]         HOSPITALIST  DISCHARGE SUMMARY    Patient Name: Nic Nicolas  : 1965  MRN: [id number]    Date of Admission: 2025  Date of Discharge:  2025  Primary Care Physician: Sheldon Carcamo MD    Consults       Date and Time Order Name Status Description    2025  5:00 AM Inpatient Hospitalist Consult      2024 12:39 PM Inpatient General Surgery Consult Completed     2024 12:12 PM Inpatient Gastroenterology Consult Completed             Active and Resolved Hospital Problems:  Acute metabolic encephalopathy secondary to hepatic encephalopathy  Lactic acidosis likely due to poor liver clearance  Chronic cirrhosis secondary to nonalcoholic fatty liver disease, MELD = 17  Elevated troponin likely type II MI due to demand ischemia due to above  Recent GI bleed secondary to portal hypertensive gastropathy  Chronic anemia  Chronic diastolic congestive heart failure  Anasarca  CKD stage III  History of TBI  Chronic splenomegaly  DM  Long-term nursing home resident  Pancytopenia likely secondary to cirrhosis    Hospital Course     Hospital Course:  Nic Nicolas is a 59 y.o. male past medical history of cirrhosis secondary to NAFLD, recurrent hepatic encephalopathy admissions, hypertension, TBI, type 2 diabetes, CHF, CKD, asthma, obesity, recent GI bleed secondary to portal hypertensive gastropathy status post argon treatment who presents to the ER due to worsening confusion at his nursing home.  Patient unable to provide history due to his condition and history supplemented by discussion with the ER staff.  Appears patient is a long-term resident at [facility] and was apparently doing well until tonight shift when nursing staff at their facility noted him to be significantly more confused than he had been in the preceding days and due to his history of recurrent encephalopathy admissions he was transferred to the ER for evaluation     Upon arrival  patient was hemodynamically stable and lab workup revealed significant elevated ammonia of 149, mild lactic acidosis of 2.2.  Urinalysis showed some microscopic hematuria and a CMP did not show any extensive hyperbilirubinemia.  Troponin was initially elevated and downtrended.  Magnesium low 1.4.  Chest x-ray was unremarkable.  Patient was given lactulose in the ER and he did tolerate it fine but had to be fed through a syringe for him to tolerate.  Due to his significant encephalopathy hospitalist was then contacted for admission.  Patient treated with lactulose, Xifaxan, zinc with good response.  Patient's mental status returned to baseline.  Patient was ambulating without difficulty.  Patient's pain was controlled.  Patient will continue on these medications, will titrate to 2-3 bowel movements per day, patient to follow-up with his PCP in 3 to 7 days following discharge.    Day of Discharge     Vital Signs:  Temp:  [97.7 °F (36.5 °C)-98.1 °F (36.7 °C)] 98.1 °F (36.7 °C)  Heart Rate:  [68-78] 72  Resp:  [18-20] 18  BP: (120-134)/(49-56) 132/51    Physical Exam:   GEN: No acute distress  HEENT: Moist mucous membranes  LUNGS: Equal chest rise bilaterally  CARDIAC: Regular rate and rhythm  NEURO: Moving all 4 extremities spontaneously  SKIN: No obvious breakdown    Discharge Details        Discharge Medications        Changes to Medications        Instructions Start Date   furosemide 20 MG tablet  Commonly known as: LASIX  What changed:   how much to take  Another medication with the same name was removed. Continue taking this medication, and follow the directions you see here.   40 mg, Oral, Daily      lactulose 10 GM/15ML solution  Commonly known as: CHRONULAC  What changed: additional instructions   30 g, Oral, 4 Times Daily, Hold after 3rd BM of the day      oxyCODONE 10 MG tablet  Commonly known as: ROXICODONE  What changed: See the new instructions.   10 mg, Oral, Every 8 Hours PRN             Continue These  Medications        Instructions Start Date   busPIRone 7.5 MG tablet  Commonly known as: BUSPAR   7.5 mg, Oral, 3 Times Daily      carvedilol 25 MG tablet  Commonly known as: COREG   25 mg, Every Morning      carvedilol 12.5 MG tablet  Commonly known as: COREG   12.5 mg, Nightly      cetirizine 5 MG tablet  Commonly known as: zyrTEC   Take 1 tablet by mouth Daily.      Diclofenac Sodium 1 % gel gel  Commonly known as: VOLTAREN   4 g, 4 Times Daily      Flovent  MCG/ACT inhaler  Generic drug: fluticasone   1 puff, Inhalation, 2 Times Daily - RT      fluticasone 50 MCG/ACT nasal spray  Commonly known as: FLONASE   1 spray, Daily      Galzin 50 MG capsule  Generic drug: Zinc Acetate   50 mg, Daily      HumaLOG KwikPen 100 UNIT/ML solution pen-injector  Generic drug: Insulin Lispro (1 Unit Dial)   15 Units, 3 Times Daily Before Meals      hydrocortisone 1 % lotion   1 Application, 2 Times Daily PRN      hydrOXYzine 25 MG tablet  Commonly known as: ATARAX   Take 1 tablet by mouth Every 8 (Eight) Hours As Needed for Itching.      Levemir FlexPen 100 UNIT/ML injection  Generic drug: insulin detemir   10 Units, Nightly      levETIRAcetam 500 MG tablet  Commonly known as: Keppra   500 mg, Oral, 2 Times Daily      LidozenPatch 4-1 % patch  Generic drug: Lidocaine-Menthol   1 patch, Daily      magnesium oxide 400 MG tablet  Commonly known as: MAG-OX   400 mg, Oral, Daily      ondansetron ODT 4 MG disintegrating tablet  Commonly known as: ZOFRAN-ODT   4 mg, Every 8 Hours PRN      pantoprazole 40 MG EC tablet  Commonly known as: PROTONIX   40 mg, Oral, 2 Times Daily      potassium chloride 10 MEQ CR capsule  Commonly known as: MICRO-K   10 mEq, Daily      rOPINIRole 1 MG tablet  Commonly known as: REQUIP   1 mg, Oral, Nightly, take 1 hour before bedtime      saccharomyces boulardii 250 MG capsule  Commonly known as: FLORASTOR   250 mg, 2 Times Daily      sertraline 100 MG tablet  Commonly known as: ZOLOFT   100 mg, Oral,  Nightly      simethicone 80 MG chewable tablet  Commonly known as: MYLICON   80 mg, 3 Times Daily Before Meals      spironolactone 100 MG tablet  Commonly known as: ALDACTONE   200 mg, Oral, Daily      sucralfate 1 g tablet  Commonly known as: CARAFATE   1 g, 4 Times Daily      tetrahydrozoline 0.05 % ophthalmic solution   1 drop, 2 Times Daily      triamcinolone 0.1 % cream  Commonly known as: KENALOG   Apply 1 Application topically to the appropriate area as directed 2 (Two) Times a Day.      Vitamin D3 50 MCG (2000 UT) capsule   2,000 Units, Daily      white petrolatum ointment   1 Application, 2 Times Daily      Xifaxan 550 MG tablet  Generic drug: riFAXIMin   Take 1 tablet by mouth Every 12 (Twelve) Hours.               No Known Allergies    Discharge Disposition:  Skilled Nursing Facility ([place] - External)    Diet:  Hospital:  Diet Order   Procedures    Diet: Regular/House, Diabetic; Consistent Carbohydrate; Fluid Consistency: Thin (IDDSI 0)       Discharge Activity:       CODE STATUS:  Code Status and Medical Interventions: CPR (Attempt to Resuscitate); Full Support   Ordered at: 01/06/25 0530     Code Status (Patient has no pulse and is not breathing):    CPR (Attempt to Resuscitate)     Medical Interventions (Patient has pulse or is breathing):    Full Support       No future appointments.    Additional Instructions for the Follow-ups that You Need to Schedule       Discharge Follow-up with PCP   As directed       Currently Documented PCP:    Sheldon Carcamo MD    PCP Phone Number:    [phone]     Follow Up Details: 3 to 7 days                Pertinent  and/or Most Recent Results     IMAGING:  US Paracentesis    Result Date: 1/9/2025  US PARACENTESIS Date of Exam: 1/9/2025 2:50 PM EST Indication: Ascites/cirrhosis. Comparison: None available. Technique/Findings: Preliminary bedside ultrasound demonstrated no ascitic fluid in either the left and right upper and lower quadrants amenable to drainage.  Therefore, paracentesis not performed. Patient is agreeable to hold off on procedure until enough ascitic fluid has collected to safely drain.     Impression: No ascitic fluid amenable to drainage Report dictated by: Keisha Gomez  I have personally reviewed this case and agree with the findings above: Electronically Signed: Fili Jones MD  1/9/2025 4:03 PM EST  Workstation ID: [id number]    XR Chest 1 View    Result Date: 1/6/2025  XR CHEST 1 VW Date of Exam: 1/6/2025 2:27 AM EST Indication: Weak/Dizzy/AMS triage protocol Comparison: 12/4/2024 Findings: Cardiac and mediastinal contours are normal. Pulmonary vascularity is normal. The lungs are clear. No pneumothorax.     No active disease. Electronically Signed: Kye Breaux MD  1/6/2025 2:36 AM EST  Workstation ID: [id number]      LAB RESULTS:      Lab 01/13/25 0551 01/12/25  0623 01/11/25  0607 01/10/25  0448 01/09/25  1331 01/09/25  0519   WBC 4.38 4.44 4.51 3.53  --  3.95   HEMOGLOBIN 8.1* 8.3* 8.3* 8.1*  --  8.4*   HEMATOCRIT 26.2* 26.3* 26.3* 25.8*  --  27.2*   PLATELETS 71* 71* 73* 65*  --  66*   NEUTROS ABS 2.99 3.04 3.10 2.33  --  2.67   IMMATURE GRANS (ABS) 0.03 0.02 0.03 0.02  --  0.02   LYMPHS ABS 0.88 0.87 0.77 0.72  --  0.71   MONOS ABS 0.35 0.36 0.45 0.32  --  0.41   EOS ABS 0.10 0.11 0.11 0.12  --  0.12   MCV 89.4 88.0 88.3 88.4  --  90.1   PROTIME  --   --   --   --  23.9*  --    APTT  --   --   --   --  39.6*  --          Lab 01/13/25  0551 01/12/25 0623 01/11/25  0607 01/10/25  0448 01/09/25  0519   SODIUM 135* 137 136 138 135*   POTASSIUM 4.2 4.0 4.3 4.0 4.2   CHLORIDE 102 102 100 104 103   CO2 27.5 28.4 28.3 25.9 24.9   ANION GAP 5.5 6.6 7.7 8.1 7.1   BUN 28* 26* 22* 17 14   CREATININE 1.10 1.15 1.43* 1.14 1.17   EGFR 77.8 73.8 56.8* 74.5 72.3   GLUCOSE 172* 133* 239* 152* 173*   CALCIUM 8.2* 8.8 8.7 8.3* 8.6   MAGNESIUM 2.0 1.5* 1.5* 1.7 1.4*   PHOSPHORUS 3.0 3.2 3.4 3.1 3.0         Lab 01/13/25  0551 01/12/25  0623 01/11/25  0607  01/10/25  0448 01/09/25  0519   TOTAL PROTEIN 5.4* 5.7* 5.6* 5.4* 5.5*   ALBUMIN 2.6* 2.8* 2.8* 2.7* 2.6*   GLOBULIN 2.8 2.9 2.8 2.7 2.9   ALT (SGPT) 17 19 20 20 23   AST (SGOT) 35 37 37 38 40   BILIRUBIN 1.3* 1.5* 1.1 1.4* 1.4*   ALK PHOS 119* 121* 124* 118* 111         Lab 01/09/25  1331   PROTIME 23.9*   INR 2.09*                 Brief Urine Lab Results  (Last result in the past 365 days)        Color   Clarity   Blood   Leuk Est   Nitrite   Protein   CREAT   Urine HCG        01/06/25 0339 Yellow   Clear   Moderate (2+)   Negative   Negative   Negative                 Microbiology Results (last 10 days)       ** No results found for the last 240 hours. **          Results for orders placed during the hospital encounter of 02/24/22    Duplex Carotid Ultrasound CAR    Interpretation Summary  · No significant stenosis is present in carotid bifurcations bilaterally.  · There are right mid internal carotid artery velocity elevations that would meet criteria for mild to moderate stenosis, however, this appears to be related to tortuosity in this region.  · No significant plaque in the bifurcations bilaterally.  · Vertebral flow is antegrade bilaterally.    Results for orders placed during the hospital encounter of 02/24/22    Duplex Carotid Ultrasound CAR    Interpretation Summary  · No significant stenosis is present in carotid bifurcations bilaterally.  · There are right mid internal carotid artery velocity elevations that would meet criteria for mild to moderate stenosis, however, this appears to be related to tortuosity in this region.  · No significant plaque in the bifurcations bilaterally.  · Vertebral flow is antegrade bilaterally.    Results for orders placed during the hospital encounter of 05/13/24    Adult Transthoracic Echo Complete W/ Cont if Necessary Per Protocol    Interpretation Summary    Left ventricular ejection fraction appears to be 66 - 70%.    Left ventricular wall thickness is consistent with  mild concentric hypertrophy.    Left ventricular diastolic function is consistent with (grade I) impaired relaxation.    The left atrial cavity is mildly dilated.    Estimated right ventricular systolic pressure from tricuspid regurgitation is normal (<35 mmHg).      Time spent on Discharge including face to face service: Greater than 30 minutes      Electronically signed by Carl Palma MD, 01/14/25, 10:50 AM EST.

## 2025-01-14 NOTE — PLAN OF CARE
Goal Outcome Evaluation:  Plan of Care Reviewed With: patient        Progress: no change  Outcome Evaluation: loss of IV access occured this shfit, patient restless and did not sleep well, treated reported back pain with PRN oxycodone, blood sugar required coverage, proper diet education provided but patient remains noncompliant, continue poc.

## 2025-01-14 NOTE — PLAN OF CARE
Goal Outcome Evaluation:  Plan of Care Reviewed With: patient      Pt to discharge to [facility].

## 2025-01-21 LAB
QT INTERVAL: 414 MS
QTC INTERVAL: 485 MS

## 2025-01-23 ENCOUNTER — TELEPHONE (OUTPATIENT)
Dept: GASTROENTEROLOGY | Facility: CLINIC | Age: 60
End: 2025-01-23
Payer: MEDICAID

## 2025-01-23 NOTE — TELEPHONE ENCOUNTER
Nursing home called into the office today. Patients Ammonia level is 193. Spoke with BESSIE Díaz recommended ER for evaluation with this lab. Chely at [facility] has voiced understanding.

## 2025-02-05 ENCOUNTER — TELEPHONE (OUTPATIENT)
Dept: ONCOLOGY | Facility: HOSPITAL | Age: 60
End: 2025-02-05
Payer: MEDICAID

## 2025-02-05 NOTE — TELEPHONE ENCOUNTER
Called [facility] to see if they would like to get missed appointment from December rescheduled. They said they would talk to the APRN on sight and call back if appointment was needed.

## 2025-04-20 NOTE — PLAN OF CARE
"Goal Outcome Evaluation:  Plan of Care Reviewed With: patient        Progress: no change  Outcome Evaluation: VSS, c/o pain in upper back but patient states \"it is tolerable\", no pain medication given. Appears to have been resting well today but has seemed more lethargic today than yesterday. 1x mag run given today. Lactulose given as ordered. Standby assist to bathroom. Frequently needs to use bathroom. No acute events to report this shift. Continue plan of care.                             " PALE

## 2025-05-06 NOTE — TELEPHONE ENCOUNTER
[facility] is sending in 3 pills. Pt aware    What Type Of Note Output Would You Prefer (Optional)?: Standard Output How Severe Are Your Spot(S)?: mild Have Your Spot(S) Been Treated In The Past?: has not been treated Hpi Title: Evaluation of Skin Lesions

## (undated) DEVICE — Device

## (undated) DEVICE — Device: Brand: DEFENDO AIR/WATER/SUCTION AND BIOPSY VALVE

## (undated) DEVICE — SINGLE-USE BIOPSY FORCEPS: Brand: RADIAL JAW 4

## (undated) DEVICE — BLCK/BITE BLOX WO/DENTL/RIM W/STRAP 54F

## (undated) DEVICE — SOL IRRG H2O PL/BG 1000ML STRL

## (undated) DEVICE — LINER SURG CANSTR SXN S/RIGD 1500CC

## (undated) DEVICE — FIAPC® PROBE W/ FILTER 2200 A OD 2.3MM/6.9FR; L 2.2M/7.2FT: Brand: ERBE

## (undated) DEVICE — SOLIDIFIER LIQLOC PLS 1500CC BT

## (undated) DEVICE — PAD GRND REM POLYHESIVE A/ DISP

## (undated) DEVICE — CONN JET HYDRA H20 AUXILIARY DISP

## (undated) DEVICE — FIAPC® PROBE W/ FILTER 2200 C OD 2.3MM/6.9FR; L 2.2M/7.2FT: Brand: ERBE

## (undated) DEVICE — SUREFIT, DUAL DISPERSIVE ELECTRODE, CONTACT QUALITY MONITOR: Brand: SUREFIT

## (undated) DEVICE — APC PROBE 2200 A, SINGLE USE OD 2.3MM/6.9FR; L. 2.2M/7.2FT: Brand: ERBE